# Patient Record
Sex: MALE | Race: WHITE | NOT HISPANIC OR LATINO | Employment: OTHER | URBAN - METROPOLITAN AREA
[De-identification: names, ages, dates, MRNs, and addresses within clinical notes are randomized per-mention and may not be internally consistent; named-entity substitution may affect disease eponyms.]

---

## 2017-02-23 ENCOUNTER — APPOINTMENT (OUTPATIENT)
Dept: RADIOLOGY | Facility: IMAGING CENTER | Age: 67
End: 2017-02-23
Attending: EMERGENCY MEDICINE
Payer: COMMERCIAL

## 2017-02-23 ENCOUNTER — HOSPITAL ENCOUNTER (OUTPATIENT)
Facility: MEDICAL CENTER | Age: 67
End: 2017-02-23
Attending: EMERGENCY MEDICINE
Payer: COMMERCIAL

## 2017-02-23 ENCOUNTER — TELEPHONE (OUTPATIENT)
Dept: URGENT CARE | Facility: PHYSICIAN GROUP | Age: 67
End: 2017-02-23

## 2017-02-23 ENCOUNTER — OFFICE VISIT (OUTPATIENT)
Dept: URGENT CARE | Facility: PHYSICIAN GROUP | Age: 67
End: 2017-02-23
Payer: COMMERCIAL

## 2017-02-23 VITALS
TEMPERATURE: 99.9 F | DIASTOLIC BLOOD PRESSURE: 82 MMHG | RESPIRATION RATE: 16 BRPM | OXYGEN SATURATION: 97 % | SYSTOLIC BLOOD PRESSURE: 128 MMHG | HEART RATE: 88 BPM | BODY MASS INDEX: 27.36 KG/M2 | HEIGHT: 74 IN | WEIGHT: 213.2 LBS

## 2017-02-23 DIAGNOSIS — S69.92XA INJURY OF LEFT INDEX FINGER, INITIAL ENCOUNTER: ICD-10-CM

## 2017-02-23 DIAGNOSIS — S61.219A LACERATION OF FINGER, INITIAL ENCOUNTER: ICD-10-CM

## 2017-02-23 PROCEDURE — 99204 OFFICE O/P NEW MOD 45 MIN: CPT | Performed by: EMERGENCY MEDICINE

## 2017-02-23 PROCEDURE — 73140 X-RAY EXAM OF FINGER(S): CPT | Mod: TC,LT | Performed by: EMERGENCY MEDICINE

## 2017-02-23 PROCEDURE — 99000 SPECIMEN HANDLING OFFICE-LAB: CPT | Performed by: EMERGENCY MEDICINE

## 2017-02-23 RX ORDER — SULFAMETHOXAZOLE AND TRIMETHOPRIM 800; 160 MG/1; MG/1
1 TABLET ORAL EVERY 12 HOURS
Qty: 20 TAB | Refills: 0 | Status: SHIPPED | OUTPATIENT
Start: 2017-02-23 | End: 2017-03-16

## 2017-02-23 RX ORDER — MUPIROCIN CALCIUM 20 MG/G
CREAM TOPICAL
Qty: 1 TUBE | Refills: 0 | Status: SHIPPED | OUTPATIENT
Start: 2017-02-23 | End: 2018-01-09

## 2017-02-23 ASSESSMENT — ENCOUNTER SYMPTOMS
SENSORY CHANGE: 0
CHILLS: 0
JOINT SWELLING: 0
DEPRESSION: 0
EYE REDNESS: 0
NUMBNESS: 0
ABDOMINAL PAIN: 0
FEVER: 1
VOMITING: 0
NAUSEA: 0
COUGH: 0
HEADACHES: 0
SPEECH CHANGE: 0
EYE DISCHARGE: 0

## 2017-02-23 NOTE — MR AVS SNAPSHOT
"        Francesco Schulte   2017 1:40 PM   Office Visit   MRN: [id number]    Department:  [facility]   Dept Phone:  [phone]    Description:  Male : 1950   Provider:  SATINDER Valenzuela M.D.           Reason for Visit     Finger Injury L. index finger injury X 1 week Pt. smashed the finger in a door.       Allergies as of 2017     Allergen Noted Reactions    Metformin 2016   Unspecified    \"Similar to a heart attack, I was hospitalized\"      You were diagnosed with     Laceration of finger, initial encounter   [[id number]]         Vital Signs     Blood Pressure Pulse Temperature Respirations Height Weight    128/82 mmHg 88 37.7 °C (99.9 °F) 16 1.88 m (6' 2\") 96.707 kg (213 lb 3.2 oz)    Body Mass Index Oxygen Saturation                27.36 kg/m2 97%          Basic Information     Date Of Birth Sex Race Ethnicity Preferred Language    1950 Male White Non- English      Problem List              ICD-10-CM Priority Class Noted - Resolved    Polycythemia D75.1   2016 - Present      Health Maintenance     Patient has no pending health maintenance at this time      Current Immunizations     No immunizations on file.      Below and/or attached are the medications your provider expects you to take. Review all of your home medications and newly ordered medications with your provider and/or pharmacist. Follow medication instructions as directed by your provider and/or pharmacist. Please keep your medication list with you and share with your provider. Update the information when medications are discontinued, doses are changed, or new medications (including over-the-counter products) are added; and carry medication information at all times in the event of emergency situations     Allergies:  METFORMIN - Unspecified               Medications  Valid as of: 2017 -  2:40 PM    Generic Name Brand Name Tablet Size Instructions for use    Apixaban (Tab) ELIQUIS 5mg Take 1 Tab by " mouth 2 Times a Day.        Colesevelam HCl (Tab) WELCHOL 625 MG Take 625 mg by mouth 2 times a day, with meals.        Finasteride (Tab) PROSCAR 5 MG Take 5 mg by mouth every day.        GlipiZIDE (Tab) GLUCOTROL 10 MG Take 10 mg by mouth 2 times a day.        Insulin NPH Human (Isophane) (Suspension) HUMULIN,NOVOLIN 100 UNIT/ML Inject 20 Units as instructed every evening.        Metoprolol Succinate (TABLET SR 24 HR) TOPROL XL 25 MG Take 1 Tab by mouth every day.        Metoprolol Tartrate (Tab) LOPRESSOR 50 MG Take 0.5 Tabs by mouth as needed (Take once a day PRN if patient feels rapid heart rate, make sure to take blood pressure to avoid hypotension of systolic less than 95.).        Mupirocin Calcium (Cream) BACTROBAN 2 % Apply twice a day and/ or after each washing to affected area.        Rivaroxaban (Tab) XARELTO 20 MG Take 1 Tab by mouth with dinner.        Sulfamethoxazole-Trimethoprim (Tab) BACTRIM -160 MG Take 1 Tab by mouth every 12 hours. Stay hydrated        Tadalafil (Tab) CIALIS 5 MG Take 5 mg by mouth every day.        Tamsulosin HCl (Cap) FLOMAX 0.4 MG Take 0.4 mg by mouth 2 Times a Day.        Ticagrelor (Tab) BRILINTA 90 MG Take 90 mg by mouth 2 Times a Day.        .                 Medicines prescribed today were sent to:     [facility] [address] [place] [address] - [id number] [place] [address] & [place] [id number] [facility] [address]    Phone: [phone] Fax: [phone]    Open 24 Hours?: No      Medication refill instructions:       If your prescription bottle indicates you have medication refills left, it is not necessary to call your provider’s office. Please contact your pharmacy and they will refill your medication.    If your prescription bottle indicates you do not have any refills left, you may request refills at any time through one of the following ways: The online [email or web address] system (except Urgent Care), by calling your provider’s office, or by  asking your pharmacy to contact your provider’s office with a refill request. Medication refills are processed only during regular business hours and may not be available until the next business day. Your provider may request additional information or to have a follow-up visit with you prior to refilling your medication.   *Please Note: Medication refills are assigned a new Rx number when refilled electronically. Your pharmacy may indicate that no refills were authorized even though a new prescription for the same medication is available at the pharmacy. Please request the medicine by name with the pharmacy before contacting your provider for a refill.           [employer] Access Code: [email or web address]  Expires: 3/10/2017 11:47 AM    [employer]  A secure, online tool to manage your health information     [employer]’s Grow[employer]® is a secure, online tool that connects you to your personalized health information from the privacy of your home -- day or night - making it very easy for you to manage your healthcare. Once the activation process is completed, you can even access your medical information using the Grow[employer] rayna, which is available for free in the Apple Rayna store or Google Play store.     [employer] provides the following levels of access (as shown below):   My Chart Features   [facility] Doctor [facility]  Specialists [facility]  Urgent  Care [facility]  Primary Care  Doctor   Email your healthcare team securely and privately 24/7 X X X    Manage appointments: schedule your next appointment; view details of past/upcoming appointments X      Request prescription refills. X      View recent personal medical records, including lab and immunizations X X X X   View health record, including health history, allergies, medications X X X X   Read reports about your outpatient visits, procedures, consult and ER notes X X X X   See your discharge summary, which is a recap of your hospital and/or ER visit that includes your  diagnosis, lab results, and care plan. X X       How to register for [email or web address]:  1. Go to  [email or web address].  2. Click on the Sign Up Now box, which takes you to the New Member Sign Up page. You will need to provide the following information:  a. Enter your [email or web address] Access Code exactly as it appears at the top of this page. (You will not need to use this code after you’ve completed the sign-up process. If you do not sign up before the expiration date, you must request a new code.)   b. Enter your date of birth.   c. Enter your home email address.   d. Click Submit, and follow the next screen’s instructions.  3. Create a Govenlock Greent ID. This will be your [email or web address] login ID and cannot be changed, so think of one that is secure and easy to remember.  4. Create a [employer] Greent password. You can change your password at any time.  5. Enter your Password Reset Question and Answer. This can be used at a later time if you forget your password.   6. Enter your e-mail address. This allows you to receive e-mail notifications when new information is available in [email or web address].  7. Click Sign Up. You can now view your health information.    For assistance activating your [email or web address] account, call [phone]

## 2017-02-23 NOTE — PROGRESS NOTES
"Subjective:      Francesco Schulte is a 66 y.o. male who presents with No chief complaint on file.            Hand Injury  This is a new problem. The current episode started in the past 7 days. Associated symptoms include a fever. Pertinent negatives include no abdominal pain, chest pain, chills, coughing, headaches, joint swelling, nausea, numbness, rash or vomiting. Associated symptoms comments: Patient caught his previously partially amputated left index finger in the garage door 6 days ago, now complaining of severe pain on the tip of the left index finger with occasional drainage.. Exacerbated by: tender to palpation. Treatments tried: patient is tried to keep the dressing on it.     Allergies   Allergen Reactions   • Metformin Unspecified     \"Similar to a heart attack, I was hospitalized\"     Social History     Social History   • Marital Status:      Spouse Name: N/A   • Number of Children: N/A   • Years of Education: N/A     Occupational History   • Not on file.     Social History Main Topics   • Smoking status: Not on file   • Smokeless tobacco: Not on file   • Alcohol Use: Not on file   • Drug Use: Not on file   • Sexual Activity: Not on file     Other Topics Concern   • Not on file     Social History Narrative   Medication Sig Dispense Refill   • metoprolol SR (TOPROL XL) 25 MG TABLET SR 24 HR Take 1 Tab by mouth every day. 30 Tab 2   • metoprolol (LOPRESSOR) 50 MG Tab Take 0.5 Tabs by mouth as needed (Take once a day PRN if patient feels rapid heart rate, make sure to take blood pressure to avoid hypotension of systolic less than 95.). 30 Tab 0   • apixaban (ELIQUIS) 5mg Tab Take 1 Tab by mouth 2 Times a Day. 60 Tab 1   • ticagrelor (BRILINTA) 90 MG Tab tablet Take 90 mg by mouth 2 Times a Day.     • tamsulosin (FLOMAX) 0.4 MG capsule Take 0.4 mg by mouth 2 Times a Day.     • finasteride (PROSCAR) 5 MG Tab Take 5 mg by mouth every day.     • colesevelam (WELCHOL) 625 MG Tab Take 625 mg by mouth 2 " "times a day, with meals.     • glipiZIDE (GLUCOTROL) 10 MG Tab Take 10 mg by mouth 2 times a day.     • tadalafil (CIALIS) 5 MG tablet Take 5 mg by mouth every day.     • insulin NPH (HUMULIN,NOVOLIN) 100 UNIT/ML Suspension Inject 20 Units as instructed every evening.     • rivaroxaban (XARELTO) 20 MG Tab tablet Take 1 Tab by mouth with dinner. 30 Tab 2     No current facility-administered medications on file prior to visit.   History reviewed. No pertinent family history.   Review of Systems   Constitutional: Positive for fever. Negative for chills.   Eyes: Negative for discharge and redness.   Respiratory: Negative for cough.    Cardiovascular: Negative for chest pain.   Gastrointestinal: Negative for nausea, vomiting and abdominal pain.   Musculoskeletal: Negative for joint pain and joint swelling.        Patient's very tender to the tip of his left index finger. Also says there is some discomfort on his third and fourth digits very   Skin: Negative for itching and rash.   Neurological: Negative for sensory change, speech change, numbness and headaches.   Psychiatric/Behavioral: Negative for depression and suicidal ideas.    last tetanus shot was 6 years ago.       Objective:     Blood pressure 128/82, pulse 88, temperature 37.7 °C (99.9 °F), resp. rate 16, height 1.88 m (6' 2\"), weight 96.707 kg (213 lb 3.2 oz), SpO2 97 %.     Physical Exam   Constitutional: He appears well-developed and well-nourished. No distress.   HENT:   Head: Normocephalic and atraumatic.   Right Ear: External ear normal.   Left Ear: External ear normal.   TMs are normal pharynx is benign without exudate. No signs of peritonsillar or retropharyngeal abscess. Mastoid processes are nontender.   Eyes: Right eye exhibits no discharge. Left eye exhibits no discharge. No scleral icterus.   Cardiovascular: Normal rate.    Pulmonary/Chest: Effort normal.   Abdominal: He exhibits no distension. There is no tenderness.   Musculoskeletal:   Index " finger distal tuft. Slight asymmetry of the tip. Patient states that's where the purulence or clear fluid was coming from and was cultured. A 18-gauge needle was used to lift the tip up to get a sample.   Neurological: He is alert.   Skin: Skin is warm and dry. He is not diaphoretic. No erythema.   Psychiatric: He has a normal mood and affect. His behavior is normal.   Vitals reviewed.      Finger x-rays shows old fracture avulsion but no evidence of osteomyelitis.  Assessment/Plan:     Diagnosis Crush injury to the left index                      Cellulitis to crush injury.    Patient will have his wound culture he will soak it twice a day placed Bactroban on the tip as well as take Septra double strength. I will call him with the cultures been covered for staph strep and MRSA should any of them being involved in his fingertip. Patient was given a reading of his x-ray

## 2017-02-28 ENCOUNTER — TELEPHONE (OUTPATIENT)
Dept: URGENT CARE | Facility: CLINIC | Age: 67
End: 2017-02-28

## 2017-02-28 NOTE — TELEPHONE ENCOUNTER
I contacted the patient he says his fingers doing much better  cultures were negative at 48 hours although the grams gained showed gram-positive cocci O contact him with any further report.

## 2017-03-05 ENCOUNTER — TELEPHONE (OUTPATIENT)
Dept: URGENT CARE | Facility: PHYSICIAN GROUP | Age: 67
End: 2017-03-05

## 2017-03-11 ENCOUNTER — APPOINTMENT (OUTPATIENT)
Dept: RADIOLOGY | Facility: MEDICAL CENTER | Age: 67
End: 2017-03-11
Attending: EMERGENCY MEDICINE
Payer: COMMERCIAL

## 2017-03-11 ENCOUNTER — APPOINTMENT (OUTPATIENT)
Dept: RADIOLOGY | Facility: MEDICAL CENTER | Age: 67
DRG: 247 | End: 2017-03-11
Attending: EMERGENCY MEDICINE
Payer: COMMERCIAL

## 2017-03-11 ENCOUNTER — HOSPITAL ENCOUNTER (EMERGENCY)
Facility: MEDICAL CENTER | Age: 67
End: 2017-03-11
Attending: EMERGENCY MEDICINE
Payer: COMMERCIAL

## 2017-03-11 ENCOUNTER — RESOLUTE PROFESSIONAL BILLING HOSPITAL PROF FEE (OUTPATIENT)
Dept: HOSPITALIST | Facility: MEDICAL CENTER | Age: 67
End: 2017-03-11
Payer: COMMERCIAL

## 2017-03-11 ENCOUNTER — HOSPITAL ENCOUNTER (INPATIENT)
Facility: MEDICAL CENTER | Age: 67
LOS: 3 days | DRG: 247 | End: 2017-03-14
Attending: EMERGENCY MEDICINE | Admitting: INTERNAL MEDICINE
Payer: COMMERCIAL

## 2017-03-11 VITALS
WEIGHT: 210 LBS | TEMPERATURE: 98.7 F | HEART RATE: 55 BPM | SYSTOLIC BLOOD PRESSURE: 103 MMHG | DIASTOLIC BLOOD PRESSURE: 58 MMHG | BODY MASS INDEX: 26.95 KG/M2

## 2017-03-11 DIAGNOSIS — E13.8 DIABETES MELLITUS OF OTHER TYPE WITH COMPLICATION: ICD-10-CM

## 2017-03-11 DIAGNOSIS — D75.839 THROMBOCYTOSIS: ICD-10-CM

## 2017-03-11 DIAGNOSIS — D75.1 POLYCYTHEMIA: ICD-10-CM

## 2017-03-11 DIAGNOSIS — I21.3 ST ELEVATION MYOCARDIAL INFARCTION (STEMI), UNSPECIFIED ARTERY (HCC): ICD-10-CM

## 2017-03-11 DIAGNOSIS — I48.0 PAROXYSMAL ATRIAL FIBRILLATION (HCC): ICD-10-CM

## 2017-03-11 DIAGNOSIS — N28.9 RENAL INSUFFICIENCY: ICD-10-CM

## 2017-03-11 LAB
ALBUMIN SERPL BCP-MCNC: 3.6 G/DL (ref 3.2–4.9)
ALBUMIN SERPL BCP-MCNC: 3.8 G/DL (ref 3.2–4.9)
ALBUMIN/GLOB SERPL: 1 G/DL
ALBUMIN/GLOB SERPL: 1 G/DL
ALP SERPL-CCNC: 67 U/L (ref 30–99)
ALP SERPL-CCNC: 73 U/L (ref 30–99)
ALT SERPL-CCNC: 34 U/L (ref 2–50)
ALT SERPL-CCNC: 38 U/L (ref 2–50)
ANION GAP SERPL CALC-SCNC: 11 MMOL/L (ref 0–11.9)
ANION GAP SERPL CALC-SCNC: 17 MMOL/L (ref 0–11.9)
ANISOCYTOSIS BLD QL SMEAR: ABNORMAL
APTT PPP: 31.9 SEC (ref 24.7–36)
APTT PPP: 33.1 SEC (ref 24.7–36)
AST SERPL-CCNC: 29 U/L (ref 12–45)
AST SERPL-CCNC: 36 U/L (ref 12–45)
BASOPHILS # BLD AUTO: 0 % (ref 0–1.8)
BASOPHILS # BLD AUTO: 0.2 % (ref 0–1.8)
BASOPHILS # BLD: 0 K/UL (ref 0–0.12)
BASOPHILS # BLD: 0.04 K/UL (ref 0–0.12)
BILIRUB SERPL-MCNC: 1.2 MG/DL (ref 0.1–1.5)
BILIRUB SERPL-MCNC: 1.5 MG/DL (ref 0.1–1.5)
BNP SERPL-MCNC: 146 PG/ML (ref 0–100)
BNP SERPL-MCNC: 150 PG/ML (ref 0–100)
BUN SERPL-MCNC: 20 MG/DL (ref 8–22)
BUN SERPL-MCNC: 21 MG/DL (ref 8–22)
CALCIUM SERPL-MCNC: 9.3 MG/DL (ref 8.4–10.2)
CALCIUM SERPL-MCNC: 9.8 MG/DL (ref 8.5–10.5)
CHLORIDE SERPL-SCNC: 100 MMOL/L (ref 96–112)
CHLORIDE SERPL-SCNC: 97 MMOL/L (ref 96–112)
CO2 SERPL-SCNC: 18 MMOL/L (ref 20–33)
CO2 SERPL-SCNC: 22 MMOL/L (ref 20–33)
COMMENT 1642: NORMAL
CREAT SERPL-MCNC: 1.75 MG/DL (ref 0.5–1.4)
CREAT SERPL-MCNC: 1.76 MG/DL (ref 0.5–1.4)
EKG IMPRESSION: NORMAL
EKG IMPRESSION: NORMAL
EOSINOPHIL # BLD AUTO: 0 K/UL (ref 0–0.51)
EOSINOPHIL # BLD AUTO: 0.05 K/UL (ref 0–0.51)
EOSINOPHIL NFR BLD: 0 % (ref 0–6.9)
EOSINOPHIL NFR BLD: 0.3 % (ref 0–6.9)
ERYTHROCYTE [DISTWIDTH] IN BLOOD BY AUTOMATED COUNT: 42.5 FL (ref 35.9–50)
ERYTHROCYTE [DISTWIDTH] IN BLOOD BY AUTOMATED COUNT: 43 FL (ref 35.9–50)
GFR SERPL CREATININE-BSD FRML MDRD: 39 ML/MIN/1.73 M 2
GFR SERPL CREATININE-BSD FRML MDRD: 39 ML/MIN/1.73 M 2
GIANT PLATELETS BLD QL SMEAR: NORMAL
GLOBULIN SER CALC-MCNC: 3.6 G/DL (ref 1.9–3.5)
GLOBULIN SER CALC-MCNC: 3.9 G/DL (ref 1.9–3.5)
GLUCOSE SERPL-MCNC: 251 MG/DL (ref 65–99)
GLUCOSE SERPL-MCNC: 255 MG/DL (ref 65–99)
HCT VFR BLD AUTO: 41.7 % (ref 42–52)
HCT VFR BLD AUTO: 42.1 % (ref 42–52)
HGB BLD-MCNC: 12.2 G/DL (ref 14–18)
HGB BLD-MCNC: 12.3 G/DL (ref 14–18)
HYPOCHROMIA BLD QL SMEAR: ABNORMAL
IMM GRANULOCYTES # BLD AUTO: 0.13 K/UL (ref 0–0.11)
IMM GRANULOCYTES NFR BLD AUTO: 0.8 % (ref 0–0.9)
INR PPP: 1.63 (ref 0.87–1.13)
INR PPP: 1.64 (ref 0.87–1.13)
LG PLATELETS BLD QL SMEAR: NORMAL
LIPASE SERPL-CCNC: 29 U/L (ref 11–82)
LYMPHOCYTES # BLD AUTO: 0.15 K/UL (ref 1–4.8)
LYMPHOCYTES # BLD AUTO: 0.24 K/UL (ref 1–4.8)
LYMPHOCYTES NFR BLD: 0.9 % (ref 22–41)
LYMPHOCYTES NFR BLD: 1.4 % (ref 22–41)
MACROCYTES BLD QL SMEAR: ABNORMAL
MANUAL DIFF BLD: NORMAL
MCH RBC QN AUTO: 18.5 PG (ref 27–33)
MCH RBC QN AUTO: 18.7 PG (ref 27–33)
MCHC RBC AUTO-ENTMCNC: 29 G/DL (ref 33.7–35.3)
MCHC RBC AUTO-ENTMCNC: 29.5 G/DL (ref 33.7–35.3)
MCV RBC AUTO: 63.3 FL (ref 81.4–97.8)
MCV RBC AUTO: 63.9 FL (ref 81.4–97.8)
METAMYELOCYTES NFR BLD MANUAL: 1.7 %
MONOCYTES # BLD AUTO: 0 K/UL (ref 0–0.85)
MONOCYTES # BLD AUTO: 0.32 K/UL (ref 0–0.85)
MONOCYTES NFR BLD AUTO: 0 % (ref 0–13.4)
MONOCYTES NFR BLD AUTO: 1.9 % (ref 0–13.4)
MORPHOLOGY BLD-IMP: NORMAL
NEUTROPHILS # BLD AUTO: 16.09 K/UL (ref 1.82–7.42)
NEUTROPHILS # BLD AUTO: 16.36 K/UL (ref 1.82–7.42)
NEUTROPHILS NFR BLD: 95.4 % (ref 44–72)
NEUTROPHILS NFR BLD: 97.4 % (ref 44–72)
NRBC # BLD AUTO: 0.02 K/UL
NRBC # BLD AUTO: 0.02 K/UL
NRBC BLD AUTO-RTO: 0.1 /100 WBC
NRBC BLD AUTO-RTO: 0.1 /100 WBC
PLATELET # BLD AUTO: 1053 K/UL (ref 164–446)
PLATELET # BLD AUTO: 1160 K/UL (ref 164–446)
PLATELET BLD QL SMEAR: NORMAL
PLATELET BLD QL SMEAR: NORMAL
PMV BLD AUTO: 9.4 FL (ref 9–12.9)
PMV BLD AUTO: 9.7 FL (ref 9–12.9)
POLYCHROMASIA BLD QL SMEAR: NORMAL
POTASSIUM SERPL-SCNC: 3.6 MMOL/L (ref 3.6–5.5)
POTASSIUM SERPL-SCNC: 4.1 MMOL/L (ref 3.6–5.5)
PROT SERPL-MCNC: 7.2 G/DL (ref 6–8.2)
PROT SERPL-MCNC: 7.7 G/DL (ref 6–8.2)
PROTHROMBIN TIME: 19.2 SEC (ref 12–14.6)
PROTHROMBIN TIME: 19.8 SEC (ref 12–14.6)
RBC # BLD AUTO: 6.59 M/UL (ref 4.7–6.1)
RBC # BLD AUTO: 6.59 M/UL (ref 4.7–6.1)
RBC BLD AUTO: PRESENT
RBC BLD AUTO: PRESENT
SODIUM SERPL-SCNC: 132 MMOL/L (ref 135–145)
SODIUM SERPL-SCNC: 133 MMOL/L (ref 135–145)
TARGETS BLD QL SMEAR: NORMAL
TROPONIN I SERPL-MCNC: 0.02 NG/ML (ref 0–0.04)
TROPONIN I SERPL-MCNC: <0.02 NG/ML (ref 0–0.04)
WBC # BLD AUTO: 16.8 K/UL (ref 4.8–10.8)
WBC # BLD AUTO: 16.9 K/UL (ref 4.8–10.8)

## 2017-03-11 PROCEDURE — C1894 INTRO/SHEATH, NON-LASER: HCPCS

## 2017-03-11 PROCEDURE — 83880 ASSAY OF NATRIURETIC PEPTIDE: CPT

## 2017-03-11 PROCEDURE — 80053 COMPREHEN METABOLIC PANEL: CPT

## 2017-03-11 PROCEDURE — 84484 ASSAY OF TROPONIN QUANT: CPT | Mod: 91

## 2017-03-11 PROCEDURE — A9270 NON-COVERED ITEM OR SERVICE: HCPCS | Performed by: INTERNAL MEDICINE

## 2017-03-11 PROCEDURE — 027035Z DILATION OF CORONARY ARTERY, ONE ARTERY WITH TWO DRUG-ELUTING INTRALUMINAL DEVICES, PERCUTANEOUS APPROACH: ICD-10-PCS | Performed by: INTERNAL MEDICINE

## 2017-03-11 PROCEDURE — 700111 HCHG RX REV CODE 636 W/ 250 OVERRIDE (IP)

## 2017-03-11 PROCEDURE — 85025 COMPLETE CBC W/AUTO DIFF WBC: CPT

## 2017-03-11 PROCEDURE — 82962 GLUCOSE BLOOD TEST: CPT

## 2017-03-11 PROCEDURE — 96374 THER/PROPH/DIAG INJ IV PUSH: CPT

## 2017-03-11 PROCEDURE — 99285 EMERGENCY DEPT VISIT HI MDM: CPT

## 2017-03-11 PROCEDURE — 85007 BL SMEAR W/DIFF WBC COUNT: CPT

## 2017-03-11 PROCEDURE — 360979 HCHG DIAGNOSTIC CATH

## 2017-03-11 PROCEDURE — 99291 CRITICAL CARE FIRST HOUR: CPT

## 2017-03-11 PROCEDURE — 83880 ASSAY OF NATRIURETIC PEPTIDE: CPT | Mod: 91

## 2017-03-11 PROCEDURE — 02C03ZZ EXTIRPATION OF MATTER FROM CORONARY ARTERY, ONE ARTERY, PERCUTANEOUS APPROACH: ICD-10-PCS | Performed by: INTERNAL MEDICINE

## 2017-03-11 PROCEDURE — 71010 DX-CHEST-PORTABLE (1 VIEW): CPT

## 2017-03-11 PROCEDURE — C1887 CATHETER, GUIDING: HCPCS

## 2017-03-11 PROCEDURE — 93010 ELECTROCARDIOGRAM REPORT: CPT | Performed by: INTERNAL MEDICINE

## 2017-03-11 PROCEDURE — C1874 STENT, COATED/COV W/DEL SYS: HCPCS

## 2017-03-11 PROCEDURE — B2151ZZ FLUOROSCOPY OF LEFT HEART USING LOW OSMOLAR CONTRAST: ICD-10-PCS | Performed by: INTERNAL MEDICINE

## 2017-03-11 PROCEDURE — 700111 HCHG RX REV CODE 636 W/ 250 OVERRIDE (IP): Performed by: INTERNAL MEDICINE

## 2017-03-11 PROCEDURE — 700102 HCHG RX REV CODE 250 W/ 637 OVERRIDE(OP)

## 2017-03-11 PROCEDURE — 93458 L HRT ARTERY/VENTRICLE ANGIO: CPT

## 2017-03-11 PROCEDURE — 700105 HCHG RX REV CODE 258: Performed by: INTERNAL MEDICINE

## 2017-03-11 PROCEDURE — 304952 HCHG R 2 PADS

## 2017-03-11 PROCEDURE — 4A023N7 MEASUREMENT OF CARDIAC SAMPLING AND PRESSURE, LEFT HEART, PERCUTANEOUS APPROACH: ICD-10-PCS | Performed by: INTERNAL MEDICINE

## 2017-03-11 PROCEDURE — 93005 ELECTROCARDIOGRAM TRACING: CPT | Performed by: EMERGENCY MEDICINE

## 2017-03-11 PROCEDURE — 99223 1ST HOSP IP/OBS HIGH 75: CPT | Performed by: INTERNAL MEDICINE

## 2017-03-11 PROCEDURE — 85610 PROTHROMBIN TIME: CPT

## 2017-03-11 PROCEDURE — A9270 NON-COVERED ITEM OR SERVICE: HCPCS

## 2017-03-11 PROCEDURE — 85027 COMPLETE CBC AUTOMATED: CPT

## 2017-03-11 PROCEDURE — C1769 GUIDE WIRE: HCPCS

## 2017-03-11 PROCEDURE — 99152 MOD SED SAME PHYS/QHP 5/>YRS: CPT

## 2017-03-11 PROCEDURE — 99153 MOD SED SAME PHYS/QHP EA: CPT

## 2017-03-11 PROCEDURE — C1725 CATH, TRANSLUMIN NON-LASER: HCPCS

## 2017-03-11 PROCEDURE — 700117 HCHG RX CONTRAST REV CODE 255: Performed by: INTERNAL MEDICINE

## 2017-03-11 PROCEDURE — 700102 HCHG RX REV CODE 250 W/ 637 OVERRIDE(OP): Performed by: INTERNAL MEDICINE

## 2017-03-11 PROCEDURE — 85730 THROMBOPLASTIN TIME PARTIAL: CPT | Mod: 91

## 2017-03-11 PROCEDURE — 307093 HCHG TR BAND RADIAL

## 2017-03-11 PROCEDURE — 700101 HCHG RX REV CODE 250

## 2017-03-11 PROCEDURE — 700105 HCHG RX REV CODE 258

## 2017-03-11 PROCEDURE — C1757 CATH, THROMBECTOMY/EMBOLECT: HCPCS

## 2017-03-11 PROCEDURE — 36415 COLL VENOUS BLD VENIPUNCTURE: CPT

## 2017-03-11 PROCEDURE — 85730 THROMBOPLASTIN TIME PARTIAL: CPT

## 2017-03-11 PROCEDURE — 303418 HCHG 4FR ULTIMATE CATHETER

## 2017-03-11 PROCEDURE — 80053 COMPREHEN METABOLIC PANEL: CPT | Mod: 91

## 2017-03-11 PROCEDURE — 770022 HCHG ROOM/CARE - ICU (200)

## 2017-03-11 PROCEDURE — 83690 ASSAY OF LIPASE: CPT

## 2017-03-11 PROCEDURE — 84484 ASSAY OF TROPONIN QUANT: CPT

## 2017-03-11 PROCEDURE — C9606 PERC D-E COR REVASC W AMI S: HCPCS | Mod: LD

## 2017-03-11 PROCEDURE — B2111ZZ FLUOROSCOPY OF MULTIPLE CORONARY ARTERIES USING LOW OSMOLAR CONTRAST: ICD-10-PCS | Performed by: INTERNAL MEDICINE

## 2017-03-11 PROCEDURE — 93005 ELECTROCARDIOGRAM TRACING: CPT | Performed by: INTERNAL MEDICINE

## 2017-03-11 PROCEDURE — 85610 PROTHROMBIN TIME: CPT | Mod: 91

## 2017-03-11 PROCEDURE — 92924 PRQ TRLUML C ATHRC 1 ART&/BR: CPT | Mod: LD

## 2017-03-11 RX ORDER — BIVALIRUDIN 250 MG/5ML
INJECTION, POWDER, LYOPHILIZED, FOR SOLUTION INTRAVENOUS
Status: COMPLETED
Start: 2017-03-11 | End: 2017-03-11

## 2017-03-11 RX ORDER — NOREPINEPHRINE BITARTRATE 1 MG/ML
INJECTION, SOLUTION INTRAVENOUS
Status: COMPLETED
Start: 2017-03-11 | End: 2017-03-11

## 2017-03-11 RX ORDER — SODIUM CHLORIDE 9 MG/ML
INJECTION, SOLUTION INTRAVENOUS CONTINUOUS
Status: DISCONTINUED | OUTPATIENT
Start: 2017-03-11 | End: 2017-03-13

## 2017-03-11 RX ORDER — SODIUM CHLORIDE 9 MG/ML
1000 INJECTION, SOLUTION INTRAVENOUS ONCE
Status: DISCONTINUED | OUTPATIENT
Start: 2017-03-11 | End: 2017-03-11 | Stop reason: HOSPADM

## 2017-03-11 RX ORDER — INSULIN GLARGINE 100 [IU]/ML
10 INJECTION, SOLUTION SUBCUTANEOUS ONCE
Status: COMPLETED | OUTPATIENT
Start: 2017-03-11 | End: 2017-03-11

## 2017-03-11 RX ORDER — INSULIN GLARGINE 100 [IU]/ML
10 INJECTION, SOLUTION SUBCUTANEOUS NIGHTLY
COMMUNITY
End: 2018-10-15 | Stop reason: SDUPTHER

## 2017-03-11 RX ORDER — DIPHENHYDRAMINE HCL 50 MG
50 CAPSULE ORAL EVERY 6 HOURS PRN
Status: SHIPPED | COMMUNITY
End: 2018-01-25

## 2017-03-11 RX ORDER — DEXTROSE MONOHYDRATE 25 G/50ML
25 INJECTION, SOLUTION INTRAVENOUS
Status: DISCONTINUED | OUTPATIENT
Start: 2017-03-11 | End: 2017-03-14 | Stop reason: HOSPADM

## 2017-03-11 RX ORDER — EPTIFIBATIDE 0.75 MG/ML
2 INJECTION, SOLUTION INTRAVENOUS CONTINUOUS
Status: DISPENSED | OUTPATIENT
Start: 2017-03-11 | End: 2017-03-12

## 2017-03-11 RX ORDER — VERAPAMIL HYDROCHLORIDE 2.5 MG/ML
INJECTION, SOLUTION INTRAVENOUS
Status: COMPLETED
Start: 2017-03-11 | End: 2017-03-11

## 2017-03-11 RX ORDER — TAMSULOSIN HYDROCHLORIDE 0.4 MG/1
0.4 CAPSULE ORAL DAILY
Status: DISCONTINUED | OUTPATIENT
Start: 2017-03-11 | End: 2017-03-12

## 2017-03-11 RX ORDER — LIDOCAINE HYDROCHLORIDE 20 MG/ML
INJECTION, SOLUTION INFILTRATION; PERINEURAL
Status: COMPLETED
Start: 2017-03-11 | End: 2017-03-11

## 2017-03-11 RX ORDER — ONDANSETRON 2 MG/ML
INJECTION INTRAMUSCULAR; INTRAVENOUS
Status: COMPLETED
Start: 2017-03-11 | End: 2017-03-11

## 2017-03-11 RX ORDER — HEPARIN SODIUM,PORCINE 1000/ML
VIAL (ML) INJECTION
Status: COMPLETED
Start: 2017-03-11 | End: 2017-03-11

## 2017-03-11 RX ORDER — MIDAZOLAM HYDROCHLORIDE 1 MG/ML
INJECTION INTRAMUSCULAR; INTRAVENOUS
Status: COMPLETED
Start: 2017-03-11 | End: 2017-03-11

## 2017-03-11 RX ORDER — FAMOTIDINE 20 MG/1
40 TABLET, FILM COATED ORAL DAILY
Status: DISCONTINUED | OUTPATIENT
Start: 2017-03-11 | End: 2017-03-14

## 2017-03-11 RX ORDER — ATORVASTATIN CALCIUM 10 MG/1
10 TABLET, FILM COATED ORAL
Status: DISCONTINUED | OUTPATIENT
Start: 2017-03-11 | End: 2017-03-12

## 2017-03-11 RX ORDER — ASPIRIN 81 MG/1
81 TABLET, CHEWABLE ORAL DAILY
Status: DISCONTINUED | OUTPATIENT
Start: 2017-03-12 | End: 2017-03-14 | Stop reason: HOSPADM

## 2017-03-11 RX ADMIN — EPTIFIBATIDE 17.2 MG: 2 INJECTION, SOLUTION INTRAVENOUS at 16:59

## 2017-03-11 RX ADMIN — INSULIN GLARGINE 10 UNITS: 100 INJECTION, SOLUTION SUBCUTANEOUS at 22:27

## 2017-03-11 RX ADMIN — SODIUM CHLORIDE: 9 INJECTION, SOLUTION INTRAVENOUS at 19:37

## 2017-03-11 RX ADMIN — LIDOCAINE HYDROCHLORIDE: 20 INJECTION, SOLUTION INFILTRATION; PERINEURAL at 16:51

## 2017-03-11 RX ADMIN — HEPARIN SODIUM: 1000 INJECTION, SOLUTION INTRAVENOUS; SUBCUTANEOUS at 16:51

## 2017-03-11 RX ADMIN — FENTANYL CITRATE 50 MCG: 50 INJECTION, SOLUTION INTRAMUSCULAR; INTRAVENOUS at 17:33

## 2017-03-11 RX ADMIN — BIVALIRUDIN 250 MG: 250 INJECTION, POWDER, LYOPHILIZED, FOR SOLUTION INTRAVENOUS at 17:33

## 2017-03-11 RX ADMIN — NITROGLYCERIN 10 ML: 20 INJECTION INTRAVENOUS at 16:53

## 2017-03-11 RX ADMIN — TAMSULOSIN HYDROCHLORIDE 0.4 MG: 0.4 CAPSULE ORAL at 19:37

## 2017-03-11 RX ADMIN — ONDANSETRON 4 MG: 2 INJECTION, SOLUTION INTRAMUSCULAR; INTRAVENOUS at 16:52

## 2017-03-11 RX ADMIN — TICAGRELOR 90 MG: 90 TABLET ORAL at 17:35

## 2017-03-11 RX ADMIN — MIDAZOLAM 1 MG: 1 INJECTION INTRAMUSCULAR; INTRAVENOUS at 17:33

## 2017-03-11 RX ADMIN — EPTIFIBATIDE 2 MCG/KG/MIN: 0.75 INJECTION, SOLUTION INTRAVENOUS at 19:13

## 2017-03-11 RX ADMIN — FENTANYL CITRATE 50 MCG: 50 INJECTION, SOLUTION INTRAMUSCULAR; INTRAVENOUS at 15:45

## 2017-03-11 RX ADMIN — IOHEXOL 366 ML: 350 INJECTION, SOLUTION INTRAVENOUS at 17:32

## 2017-03-11 RX ADMIN — VERAPAMIL HYDROCHLORIDE 2.5 MG: 2.5 INJECTION, SOLUTION INTRAVENOUS at 16:52

## 2017-03-11 RX ADMIN — HEPARIN SODIUM 2000 UNITS: 200 INJECTION, SOLUTION INTRAVENOUS at 16:52

## 2017-03-11 RX ADMIN — FAMOTIDINE 40 MG: 20 TABLET, FILM COATED ORAL at 22:29

## 2017-03-11 RX ADMIN — NOREPINEPHRINE BITARTRATE 8 MG: 1 INJECTION INTRAVENOUS at 16:54

## 2017-03-11 ASSESSMENT — LIFESTYLE VARIABLES
TOTAL SCORE: 0
HAVE PEOPLE ANNOYED YOU BY CRITICIZING YOUR DRINKING: NO
CONSUMPTION TOTAL: NEGATIVE
HAVE YOU EVER FELT YOU SHOULD CUT DOWN ON YOUR DRINKING: NO
EVER FELT BAD OR GUILTY ABOUT YOUR DRINKING: NO
ALCOHOL_USE: YES
ON A TYPICAL DAY WHEN YOU DRINK ALCOHOL HOW MANY DRINKS DO YOU HAVE: 1
AVERAGE NUMBER OF DAYS PER WEEK YOU HAVE A DRINK CONTAINING ALCOHOL: 1
EVER HAD A DRINK FIRST THING IN THE MORNING TO STEADY YOUR NERVES TO GET RID OF A HANGOVER: NO
EVER_SMOKED: NEVER
HOW MANY TIMES IN THE PAST YEAR HAVE YOU HAD 5 OR MORE DRINKS IN A DAY: 0
TOTAL SCORE: 0
TOTAL SCORE: 0

## 2017-03-11 ASSESSMENT — PAIN SCALES - GENERAL
PAINLEVEL_OUTOF10: 0

## 2017-03-11 NOTE — ED PROVIDER NOTES
"ED Provider Note    Chief complaint  Chest pain      HPI  Francesco Schulte is a 66 y.o. male who presents to the emergency department stating that a couple of hours prior to arrival he developed severe left-sided chest pain. The pain radiates somewhat down the left arm. The patient took aspirin and nitroglycerin with minimal relief. The symptoms began when he was in [facility] and he drove about an hour and a half here prior to seeking treatment. The patient says he has a history of polycythemia as well as episodes of atrial fibrillation and has had a history of \"thrombosis in my heart\" the patient says that he has never had blood clots in his legs or lungs. At the time of arrival pain is 6 out of 10 in intensity.    REVIEW OF SYSTEMS no shortness of breath no hemoptysis no trauma no abdominal or back pain. All other systems negative    PAST MEDICAL HISTORY  Past Medical History   Diagnosis Date   • Diabetes (CMS-HCC)    • Blood clotting disorder ([facility])        FAMILY HISTORY  History reviewed. No pertinent family history.    SOCIAL HISTORY  Social History     Social History   • Marital Status:      Spouse Name: N/A   • Number of Children: N/A   • Years of Education: N/A     Social History Main Topics   • Smoking status: Former Smoker   • Smokeless tobacco: None   • Alcohol Use: No   • Drug Use: No   • Sexual Activity: Not Asked     Other Topics Concern   • None     Social History Narrative       SURGICAL HISTORY  Past Surgical History   Procedure Laterality Date   • Other cardiac surgery       stents x 3       CURRENT MEDICATIONS  Home Medications     Reviewed by Usman Randle (Pharmacy Tech) on 03/11/17 at 1541  Med List Status: Complete    Medication Last Dose Status    apixaban (ELIQUIS) 5mg Tab 3/11/2017 Active    colesevelam (WELCHOL) 625 MG Tab 3/11/2017 Active    diphenhydrAMINE (BENADRYL) 50 MG Cap 3/11/2017 Active    finasteride (PROSCAR) 5 MG Tab 3/11/2017 Active    glipiZIDE " "(GLUCOTROL) 10 MG Tab 3/10/2017 Active    metoprolol SR (TOPROL XL) 25 MG TABLET SR 24 HR 3/11/2017 Active    mupirocin calcium (BACTROBAN) 2 % Cream 3/10/2017 Active    sulfamethoxazole-trimethoprim (BACTRIM DS) 800-160 MG tablet 3/4/2017 Active    tadalafil (CIALIS) 5 MG tablet unk Active    tamsulosin (FLOMAX) 0.4 MG capsule 3/11/2017 Active    ticagrelor (BRILINTA) 90 MG Tab tablet 3/11/2017 Active                ALLERGIES  Allergies   Allergen Reactions   • Metformin Unspecified     \"Similar to a heart attack, I was hospitalized\"       PHYSICAL EXAM  VITAL SIGNS: /58 mmHg  Pulse 55  Temp(Src) 37.1 °C (98.7 °F)  Wt 95.255 kg (210 lb)   Constitutional: Awake and verbal and anxious  HENT: Mucous membranes moist  Eyes: No erythema or discharge or jaundice  Neck: Trachea midline no JVD  Cardiovascular: Regular rate and rhythm  Lungs: Clear and equal bilaterally with no apparent difficulty breathing  Abdomen/Back: Soft nontender nondistended no peritoneal findings  Skin: Warm and dry  Musculoskeletal: No leg edema or calf tenderness  Neurologic: Awake and verbal and moving all extremities    Laboratory  Laboratory testing was ordered and is pending    EKG interpretation  A 12-lead EKG shows a sinus rhythm at approximately 75 bpm, there is some ectopy and baseline artifact but the underlying rhythm appears to be sinus. There is ST elevation seen in lead V2 and V3 and Q waves also noted in V1 and V2 and V3. The ST elevation is new compared to a previous cardiogram.    MEDICAL DECISION MAKING and DISPOSITION  The patient's cardiogram is concerning for acute ST elevation myocardial infarction and consistent with his symptoms. The patient took aspirin prior to arrival. The patient was given intravenous fluids as well as fentanyl and Zofran for discomfort while in the emergency department. I reviewed the initial case with the ERP at [facility] emergency department and also Dr. Neil who is " on-call for cardiology at that facility and the patient will be transferred by ambulance emergently for further evaluation and further treatment and may require intervention. I have reviewed this with the patient and he is in favor of transfer.    IMPRESSION  1. Acute ST elevation myocardial infarction  2. History of polycythemia      Electronically signed by: Edgardo Romo, 3/11/2017 3:45 PM

## 2017-03-11 NOTE — IP AVS SNAPSHOT
" <p align=\"LEFT\"><IMG SRC=\"//[email or web address]\" alt=\"Image\" WIDTH=\"50%\" HEIGHT=\"200\" BORDER=\"\"></p>                   Name:Francesco Schulte  Medical Record Number:[id number]  CSN: [id number]    YOB: 1950   Age: 66 y.o.  Sex: male  HT:1.88 m (6' 2\") WT: 97.3 kg (214 lb 8.1 oz)          Admit Date: 3/11/2017     Discharge Date:   Today's Date: 3/14/2017  Attending Doctor:  Lenin Jose M.D.                  Allergies:  Metformin and Statins             Medication List      Take these Medications        Instructions    apixaban 5mg Tabs   Commonly known as:  ELIQUIS    Take 1 Tab by mouth 2 Times a Day.   Dose:  5 mg       aspirin 81 MG Chew chewable tablet   Commonly known as:  ASA    Take 1 Tab by mouth every day.   Dose:  81 mg       atorvastatin 40 MG Tabs   Commonly known as:  LIPITOR    Take 1 Tab by mouth every bedtime.   Dose:  40 mg       * BRILINTA 90 MG Tabs tablet   What changed:  Another medication with the same name was added. Make sure you understand how and when to take each.   Generic drug:  ticagrelor    Take 90 mg by mouth 2 Times a Day.   Dose:  90 mg       * ticagrelor 90 MG Tabs tablet   What changed:  You were already taking a medication with the same name, and this prescription was added. Make sure you understand how and when to take each.   Commonly known as:  BRILINTA    Take 1 Tab by mouth 2 Times a Day.   Dose:  90 mg       CIALIS 5 MG tablet   Generic drug:  tadalafil    Take 5 mg by mouth every day.   Dose:  5 mg       colesevelam 625 MG Tabs   Commonly known as:  WELCHOL    Take 625 mg by mouth 2 times a day, with meals.   Dose:  625 mg       diphenhydrAMINE 50 MG Caps   Commonly known as:  BENADRYL    Take 50 mg by mouth every 6 hours as needed.   Dose:  50 mg       finasteride 5 MG Tabs   Commonly known as:  PROSCAR    Take 5 mg by mouth every day.   Dose:  5 mg       fludrocortisone 0.1 MG Tabs   Commonly known as:  FLORINEF    Take 1 Tab by mouth every morning.   "   Dose:  0.1 mg       glipiZIDE 10 MG Tabs   Commonly known as:  GLUCOTROL    Take 10 mg by mouth every evening.   Dose:  10 mg       hydroxyurea 500 MG Caps   What changed:  how much to take   Commonly known as:  HYDREA    Take 3 Caps by mouth every day.   Dose:  1500 mg       insulin glargine 100 UNIT/ML Soln   Commonly known as:  LANTUS    Inject 20 Units as instructed every evening.   Dose:  20 Units       metoprolol SR 25 MG Tb24   Commonly known as:  TOPROL XL    Take 1 Tab by mouth every day.   Dose:  25 mg       mupirocin calcium 2 % Crea   Commonly known as:  BACTROBAN    Apply twice a day and/ or after each washing to affected area.       sulfamethoxazole-trimethoprim 800-160 MG tablet   Commonly known as:  BACTRIM DS    Take 1 Tab by mouth every 12 hours. Stay hydrated   Dose:  1 Tab       tamsulosin 0.4 MG capsule   Commonly known as:  FLOMAX    Take 0.4 mg by mouth 2 Times a Day.   Dose:  0.4 mg       * Notice:  This list has 2 medication(s) that are the same as other medications prescribed for you. Read the directions carefully, and ask your doctor or other care provider to review them with you.

## 2017-03-11 NOTE — IP AVS SNAPSHOT
" Home Care Instructions                                                                                                                  Name:Francesco Schulte  Medical Record Number:[id number]  CSN: [id number]    YOB: 1950   Age: 66 y.o.  Sex: male  HT:1.88 m (6' 2\") WT: 97.3 kg (214 lb 8.1 oz)          Admit Date: 3/11/2017     Discharge Date:   Today's Date: 3/14/2017  Attending Doctor:  Lenin Jose M.D.                  Allergies:  Metformin and Statins            Discharge Instructions       Comments: Discharge patient Home   Diet cardiac with 9-13 servings of fruits and vegetables   Activities as tolerated   Follow ups with PCP in 7-10 days, call for appointment   Meds per med rec sheet   No smoking, no alcohol, no caffeine   Wear seat belt in motorized vehicle   Take medications as perscribed   Keep appointments   If symptoms worsen call PCP, 911 or urgent care.Discharge Instructions    Discharged to home by car with relative. Discharged via wheelchair, hospital escort: Yes.  Special equipment needed: Cane    Be sure to schedule a follow-up appointment with your primary care doctor or any specialists as instructed.     Discharge Plan:   Influenza Vaccine Indication: Not indicated: Previously immunized this influenza season and > 8 years of age    I understand that a diet low in cholesterol, fat, and sodium is recommended for good health. Unless I have been given specific instructions below for another diet, I accept this instruction as my diet prescription.   Other diet: per doctor above    Special Instructions: Diagnosis:  Acute Coronary Syndrome (ACS) is a diagnosis that encompasses cardiac-related chest pain and heart attack. ACS occurs when the blood flow to the heart muscle is severely reduced or cut off completely due to a slow process called atherosclerosis.  Atherosclerosis is a disease in which the coronary arteries become narrow from a buildup of fat, cholesterol, and other substances that " combine to form plaque. If the plaque breaks, a blood clot will form and block the blood flow to the heart muscle. This lack of blood flow can cause damage or death to the heart muscle which is called a heart attack or Myocardial Infarction (MI). There are two different types of MIs:  ST Elevation Myocardial Infarction or STEMI (the most severe type of heart attack) and Non-ST Elevation Myocardial Infarction or NSTEMI.    Treatment Plan:  · Cardiac Diet  - Low fat, low salt, low cholesterol   · Cardiac Rehab  - Your doctor has ordered you a referral to [facility].  Call [phone] to schedule an appointment.  · Attend my follow-up appointment with my Cardiologist.  · Take my medications as prescribed by my doctor  · Exercise daily  · Quit Smoking, Lower my bad cholesterol and raise my good cholesterol, lower my blood pressure, Reduce stress and Control my diabetes    Medications:  Certain medications are used to treat ACS.  Remember to always take medications as prescribed and never stop talking medications unless told by your doctor.    You have been prescribed the following medicatons:    Aspirin - Aspirin is used as a blood thinning medication and you will require this medication indefinitely.  Anti-platelet/blood thinner - Your Anti-platelet/Blood thinning medication is called Brilinta, Eliquis, and is used in combination with aspirin to prevent clots from forming in your heart and/or around your stent.  Your doctor will determine how long you need to be on this medicine, but generally if you have a Drug Eluding stent, you will need to take this medication for at least one month, and you have a Bare Metal stent, you will need the medication for at least 3 months.  Some patients require this medication indefinitely.  Beta-Blocker - Beta-Blocker Metoprolol is used to lower blood pressure and heart rate, and/or helps your heart heal after a heart attack.    · Is patient discharged on Warfarin / Coumadin?   No      · Is patient Post Blood Transfusion?  No    Depression / Suicide Risk    As you are discharged from this [facility] facility, it is important to learn how to keep safe from harming yourself.    Recognize the warning signs:  · Abrupt changes in personality, positive or negative- including increase in energy   · Giving away possessions  · Change in eating patterns- significant weight changes-  positive or negative  · Change in sleeping patterns- unable to sleep or sleeping all the time   · Unwillingness or inability to communicate  · Depression  · Unusual sadness, discouragement and loneliness  · Talk of wanting to die  · Neglect of personal appearance   · Rebelliousness- reckless behavior  · Withdrawal from people/activities they love  · Confusion- inability to concentrate     If you or a loved one observes any of these behaviors or has concerns about self-harm, here's what you can do:  · Talk about it- your feelings and reasons for harming yourself  · Remove any means that you might use to hurt yourself (examples: pills, rope, extension cords, firearm)  · Get professional help from the community (Mental Health, Substance Abuse, psychological counseling)  · Do not be alone:Call your Safe Contact- someone whom you trust who will be there for you.  · Call your local CRISIS HOTLINE 258-2432 or 264-271-8609  · Call your local Children's Mobile Crisis Response Team [place] (828) 752-9222 or www.Migo.me  · Call the toll free National Suicide Prevention Hotlines   · National Suicide Prevention Lifeline 735-892-IJFO (9466)  · National Hope Line Network 800-SUICIDE (760-7943)      HF Patient Discharge Instructions  · Monitor your weight daily, and maintain a weight chart, to track your weight changes.   · Activity as tolerated, unless your Doctor has ordered otherwise. Other activity order: as tolerated.  · Follow a low fat, low cholesterol, low salt diet unless instructed otherwise by your Doctor. Read  the labels on the back of food products and track your intake of fat, cholesterol and salt.   · Fluid Restriction No. If a Fluid Restriction has been ordered by your Doctor, measure fluids with a measuring cup to ensure that you are not exceeding the restriction.   · No smoking.  · Oxygen Yes. If your Doctor has ordered that you wear Oxygen at home, it is important to wear it as ordered.  · Did you receive an explanation from staff on the importance of taking each of your medications and why it is necessary to keep taking them unless your doctor says to stop? Yes  · Were all of your questions answered about how to manage your heart failure and what to do if you have increased signs and symptoms after you go home? Yes  · Do you feel like your heart failure care team involved you in the care treatment plan and allowed you to make decisions regarding your care while in the hospital and addressed any discharge needs you might have? Yes    See the educational handout provided at discharge for more information on monitoring your daily weight, activity and diet. This also explains more about Heart Failure, symptoms of a flare-up and some of the tests that you have undergone.     Warning Signs of a Flare-Up include:  · Swelling in the ankles or lower legs.  · Shortness of breath, while at rest, or while doing normal activities.   · Shortness of breath at night when in bed, or coughing in bed.   · Requiring more pillows to sleep at night, or needing to sit up at night to sleep.  · Feeling weak, dizzy or fatigued.     When to call your Doctor:  · Call [facility] 7th floor about questions regarding the discharge instructions you were given [phone].  · Call your Primary Care Physician or Cardiologist if:   1. You experience any pain radiating to your jaw or neck.  2. You have any difficulty breathing.  3. You experience weight gain of 3 lbs in a day or 5 lbs in a week.   4. You feel any palpitations or irregular  heartbeats.  5. You become dizzy or lose consciousness.   If you have had an angiogram or had a pacemaker or AICD placed, and experience:  1. Bleeding, drainage or swelling at the surgical / puncture site.  2. Fever greater than 100.0 F  3. Shock from internal defibrillator.  4. Cool and / or numb extremities.  5.        Discharge Medication Instructions:    Below are the medications your physician expects you to take upon discharge:    Review all your home medications and newly ordered medications with your doctor and/or pharmacist. Follow medication instructions as directed by your doctor and/or pharmacist.    Please keep your medication list with you and share with your physician.               Medication List      START taking these medications        Instructions    aspirin 81 MG Chew chewable tablet   Last time this was given:  81 mg on 3/14/2017  9:48 AM   Commonly known as:  ASA    Take 1 Tab by mouth every day.   Dose:  81 mg       atorvastatin 40 MG Tabs   Commonly known as:  LIPITOR    Take 1 Tab by mouth every bedtime.   Dose:  40 mg       fludrocortisone 0.1 MG Tabs   Last time this was given:  0.1 mg on 3/14/2017  9:48 AM   Commonly known as:  FLORINEF    Take 1 Tab by mouth every morning.   Dose:  0.1 mg         CHANGE how you take these medications        Instructions    * BRILINTA 90 MG Tabs tablet   What changed:  Another medication with the same name was added. Make sure you understand how and when to take each.   Last time this was given:  90 mg on 3/14/2017  9:48 AM   Generic drug:  ticagrelor    Take 90 mg by mouth 2 Times a Day.   Dose:  90 mg       * ticagrelor 90 MG Tabs tablet   What changed:  You were already taking a medication with the same name, and this prescription was added. Make sure you understand how and when to take each.   Last time this was given:  90 mg on 3/14/2017  9:48 AM   Commonly known as:  BRILINTA    Take 1 Tab by mouth 2 Times a Day.   Dose:  90 mg       hydroxyurea  500 MG Caps   What changed:  how much to take   Last time this was given:  1,000 mg on 3/13/2017  9:42 PM   Commonly known as:  HYDREA    Take 3 Caps by mouth every day.   Dose:  1500 mg       * Notice:  This list has 2 medication(s) that are the same as other medications prescribed for you. Read the directions carefully, and ask your doctor or other care provider to review them with you.      CONTINUE taking these medications        Instructions    apixaban 5mg Tabs   Last time this was given:  5 mg on 3/14/2017  9:47 AM   Commonly known as:  ELIQUIS    Take 1 Tab by mouth 2 Times a Day.   Dose:  5 mg       CIALIS 5 MG tablet   Generic drug:  tadalafil    Take 5 mg by mouth every day.   Dose:  5 mg       colesevelam 625 MG Tabs   Last time this was given:  625 mg on 3/14/2017  7:30 AM   Commonly known as:  WELCHOL    Take 625 mg by mouth 2 times a day, with meals.   Dose:  625 mg       diphenhydrAMINE 50 MG Caps   Commonly known as:  BENADRYL    Take 50 mg by mouth every 6 hours as needed.   Dose:  50 mg       finasteride 5 MG Tabs   Last time this was given:  5 mg on 3/14/2017  9:47 AM   Commonly known as:  PROSCAR    Take 5 mg by mouth every day.   Dose:  5 mg       glipiZIDE 10 MG Tabs   Last time this was given:  10 mg on 3/13/2017  9:45 PM   Commonly known as:  GLUCOTROL    Take 10 mg by mouth every evening.   Dose:  10 mg       insulin glargine 100 UNIT/ML Soln   Last time this was given:  20 Units on 3/13/2017  9:43 PM   Commonly known as:  LANTUS    Inject 20 Units as instructed every evening.   Dose:  20 Units       metoprolol SR 25 MG Tb24   Last time this was given:  25 mg on 3/14/2017  9:48 AM   Commonly known as:  TOPROL XL    Take 1 Tab by mouth every day.   Dose:  25 mg       mupirocin calcium 2 % Crea   Commonly known as:  BACTROBAN    Apply twice a day and/ or after each washing to affected area.       sulfamethoxazole-trimethoprim 800-160 MG tablet   Commonly known as:  BACTRIM DS    Take 1 Tab by  mouth every 12 hours. Stay hydrated   Dose:  1 Tab       tamsulosin 0.4 MG capsule   Last time this was given:  0.4 mg on 3/14/2017  9:00 AM   Commonly known as:  FLOMAX    Take 0.4 mg by mouth 2 Times a Day.   Dose:  0.4 mg               Instructions           Diet / Nutrition:    Follow any diet instructions given to you by your doctor or the dietician, including how much salt (sodium) you are allowed each day.    If you are overweight, talk to your doctor about a weight reduction plan.    Activity:    Remain physically active following your doctor's instructions about exercise and activity.    Rest often.     Any time you become even a little tired or short of breath, SIT DOWN and rest.    Worsening Symptoms:    Report any of the following signs and symptoms to the doctor's office immediately:    *Pain of jaw, arm, or neck  *Chest pain not relieved by medication                               *Dizziness or loss of consciousness  *Difficulty breathing even when at rest   *More tired than usual                                       *Bleeding drainage or swelling of surgical site  *Swelling of feet, ankles, legs or stomach                 *Fever (>100ºF)  *Pink or blood tinged sputum  *Weight gain (3lbs/day or 5lbs /week)           *Shock from internal defibrillator (if applicable)  *Palpitations or irregular heartbeats                *Cool and/or numb extremities    Stroke Awareness    Common Risk Factors for Stroke include:    Age  Atrial Fibrillation  Carotid Artery Stenosis  Diabetes Mellitus  Excessive alcohol consumption  High blood pressure  Overweight   Physical inactivity  Smoking    Warning signs and symptoms of a stroke include:    *Sudden numbness or weakness of the face, arm or leg (especially on one side of the body).  *Sudden confusion, trouble speaking or understanding.  *Sudden trouble seeing in one or both eyes.  *Sudden trouble walking, dizziness, loss of balance or coordination.Sudden severe  headache with no known cause.    It is very important to get treatment quickly when a stroke occurs. If you experience any of the above warning signs, call 911 immediately.                   Disclaimer         Quit Smoking / Tobacco Use:    I understand the use of any tobacco products increases my chance of suffering from future heart disease or stroke and could cause other illnesses which may shorten my life. Quitting the use of tobacco products is the single most important thing I can do to improve my health. For further information on smoking / tobacco cessation call a Toll Free Quit Line at 1-766.980.4941 (*National Cancer [place]) or 1-693.647.1870 (American Lung Association) or you can access the web based program at www.lungusa.org.    [place] Tobacco Users Help Line:  (213) 229-5429       Toll Free: 1-229.104.5114  Quit Tobacco Program [facility] Management Services (858)606-3787    Crisis Hotline:    [place] Crisis Hotline:  2-762-EIXAUWI or 1-274.775.6761    [place] Crisis Hotline:    1-627.698.6966 or 379-401-2610    Discharge Survey:   Thank you for choosing [facility]. We hope we did everything we could to make your hospital stay a pleasant one. You may be receiving a phone survey and we would appreciate your time and participation in answering the questions. Your input is very valuable to us in our efforts to improve our service to our patients and their families.        My signature on this form indicates that:    1. I have reviewed and understand the above information.  2. My questions regarding this information have been answered to my satisfaction.  3. I have formulated a plan with my discharge nurse to obtain my prescribed medications for home.                  Disclaimer         __________________________________                     __________       ________                       Patient Signature                                                 Date                    Time

## 2017-03-11 NOTE — IP AVS SNAPSHOT
3/14/2017          [address]ancesco [address]  [facility] [address]    Dear Francesco:    [facility] wants to ensure your discharge home is safe and you or your loved ones have had all your questions answered regarding your care after you leave the hospital.    You may receive a telephone call within two days of your discharge.  This call is to make certain you understand your discharge instructions as well as ensure we provided you with the best care possible during your stay with us.     The call will only last approximately 3-5 minutes and will be done by a nurse.    Once again, we want to ensure your discharge home is safe and that you have a clear understanding of any next steps in your care.  If you have any questions or concerns, please do not hesitate to contact us, we are here for you.  Thank you for choosing [facility] for your healthcare needs.    Sincerely,    Xavier Guzman    [facility]

## 2017-03-11 NOTE — IP AVS SNAPSHOT
Turpitude Access Code: [email or web address]  Expires: 4/8/2017 12:47 PM    Turpitude  A secure, online tool to manage your health information     [employer]’s Turpitude® is a secure, online tool that connects you to your personalized health information from the privacy of your home -- day or night - making it very easy for you to manage your healthcare. Once the activation process is completed, you can even access your medical information using the Turpitude rayna, which is available for free in the Apple Rayna store or Google Play store.     Turpitude provides the following levels of access (as shown below):   My Chart Features   [facility] Primary Care Doctor [facility]  Specialists [facility]  Urgent  Care Non-[facility]  Primary Care  Doctor   Email your healthcare team securely and privately 24/7 X X X X   Manage appointments: schedule your next appointment; view details of past/upcoming appointments X      Request prescription refills. X      View recent personal medical records, including lab and immunizations X X X X   View health record, including health history, allergies, medications X X X X   Read reports about your outpatient visits, procedures, consult and ER notes X X X X   See your discharge summary, which is a recap of your hospital and/or ER visit that includes your diagnosis, lab results, and care plan. X X       How to register for Turpitude:  1. Go to  [email or web address].  2. Click on the Sign Up Now box, which takes you to the New Member Sign Up page. You will need to provide the following information:  a. Enter your Turpitude Access Code exactly as it appears at the top of this page. (You will not need to use this code after you’ve completed the sign-up process. If you do not sign up before the expiration date, you must request a new code.)   b. Enter your date of birth.   c. Enter your home email address.   d. Click Submit, and follow the next screen’s instructions.  3. Create a Turpitude ID. This will be your Turpitude  login ID and cannot be changed, so think of one that is secure and easy to remember.  4. Create a Tandem password. You can change your password at any time.  5. Enter your Password Reset Question and Answer. This can be used at a later time if you forget your password.   6. Enter your e-mail address. This allows you to receive e-mail notifications when new information is available in Tandem.  7. Click Sign Up. You can now view your health information.    For assistance activating your Tandem account, call [phone]

## 2017-03-11 NOTE — ED NOTES
The Medication Reconciliation process has been completed by interviewing the patient who reports that he is a [facility] pt.      Allergies have been reviewed  Antibiotic use in 30 days - Completed a septra on 3/4/17    Home Pharmacy:  [facility]

## 2017-03-11 NOTE — ED NOTES
Pt brought self in with c/o L sided CP that radiates down L arm and SOB that started 45 mins ago. Pt has blood clotting disorder and has hx of 4 cardiac stents.

## 2017-03-11 NOTE — ED NOTES
Pt took aspirin and nitro PTA. ERP saw pt. Code STEMI called at 1517. 2 large bore IV's started, labs drawn and sent Fentanyl given. Esthela mukherjee RN at [facility] ED called and made aware of pt tx.

## 2017-03-12 PROBLEM — N40.0 BPH (BENIGN PROSTATIC HYPERPLASIA): Status: ACTIVE | Noted: 2017-03-12

## 2017-03-12 PROBLEM — Z78.9 STATIN INTOLERANCE: Status: ACTIVE | Noted: 2017-03-12

## 2017-03-12 PROBLEM — I48.0 PAROXYSMAL ATRIAL FIBRILLATION (HCC): Status: ACTIVE | Noted: 2017-03-12

## 2017-03-12 PROBLEM — D75.839 THROMBOCYTOSIS: Status: ACTIVE | Noted: 2017-03-12

## 2017-03-12 PROBLEM — N17.9 AKI (ACUTE KIDNEY INJURY) (HCC): Status: ACTIVE | Noted: 2017-03-12

## 2017-03-12 PROBLEM — E78.5 DYSLIPIDEMIA: Status: ACTIVE | Noted: 2017-03-12

## 2017-03-12 LAB
ANION GAP SERPL CALC-SCNC: 7 MMOL/L (ref 0–11.9)
ANISOCYTOSIS BLD QL SMEAR: ABNORMAL
BASOPHILS # BLD AUTO: 0.8 % (ref 0–1.8)
BASOPHILS # BLD: 0.13 K/UL (ref 0–0.12)
BUN SERPL-MCNC: 24 MG/DL (ref 8–22)
CALCIUM SERPL-MCNC: 8.6 MG/DL (ref 8.5–10.5)
CFT BLD TEG: 8 MIN (ref 5–10)
CHLORIDE SERPL-SCNC: 100 MMOL/L (ref 96–112)
CHOLEST SERPL-MCNC: 70 MG/DL (ref 100–199)
CLOT ANGLE BLD TEG: 69.1 DEGREES (ref 53–72)
CLOT LYSIS 30M P MA LENFR BLD TEG: 14.9 % (ref 0–8)
CO2 SERPL-SCNC: 19 MMOL/L (ref 20–33)
CORTIS SERPL-MCNC: 12.6 UG/DL (ref 0–23)
CREAT SERPL-MCNC: 1.63 MG/DL (ref 0.5–1.4)
CT.EXTRINSIC BLD ROTEM: 1.5 MIN (ref 1–3)
EKG IMPRESSION: NORMAL
EOSINOPHIL # BLD AUTO: 0 K/UL (ref 0–0.51)
EOSINOPHIL NFR BLD: 0 % (ref 0–6.9)
ERYTHROCYTE [DISTWIDTH] IN BLOOD BY AUTOMATED COUNT: 39.8 FL (ref 35.9–50)
GFR SERPL CREATININE-BSD FRML MDRD: 43 ML/MIN/1.73 M 2
GIANT PLATELETS BLD QL SMEAR: NORMAL
GLUCOSE BLD-MCNC: 194 MG/DL (ref 65–99)
GLUCOSE BLD-MCNC: 196 MG/DL (ref 65–99)
GLUCOSE BLD-MCNC: 204 MG/DL (ref 65–99)
GLUCOSE BLD-MCNC: 361 MG/DL (ref 65–99)
GLUCOSE BLD-MCNC: 364 MG/DL (ref 65–99)
GLUCOSE SERPL-MCNC: 195 MG/DL (ref 65–99)
HCT VFR BLD AUTO: 35.3 % (ref 42–52)
HDLC SERPL-MCNC: 22 MG/DL
HGB BLD-MCNC: 10.6 G/DL (ref 14–18)
HYPOCHROMIA BLD QL SMEAR: ABNORMAL
LDLC SERPL CALC-MCNC: 37 MG/DL
LG PLATELETS BLD QL SMEAR: NORMAL
LV EJECT FRACT  99904: 45
LV EJECT FRACT MOD 2C 99903: 37.62
LV EJECT FRACT MOD 4C 99902: 40.71
LV EJECT FRACT MOD BP 99901: 36.63
LYMPHOCYTES # BLD AUTO: 0.15 K/UL (ref 1–4.8)
LYMPHOCYTES NFR BLD: 0.9 % (ref 22–41)
MANUAL DIFF BLD: NORMAL
MCF BLD TEG: 69.3 MM (ref 50–70)
MCH RBC QN AUTO: 18.6 PG (ref 27–33)
MCHC RBC AUTO-ENTMCNC: 30 G/DL (ref 33.7–35.3)
MCV RBC AUTO: 62 FL (ref 81.4–97.8)
MICROCYTES BLD QL SMEAR: ABNORMAL
MONOCYTES # BLD AUTO: 0 K/UL (ref 0–0.85)
MONOCYTES NFR BLD AUTO: 0 % (ref 0–13.4)
MORPHOLOGY BLD-IMP: NORMAL
NEUTROPHILS # BLD AUTO: 16.42 K/UL (ref 1.82–7.42)
NEUTROPHILS NFR BLD: 94.8 % (ref 44–72)
NEUTS BAND NFR BLD MANUAL: 3.5 % (ref 0–10)
NRBC # BLD AUTO: 0.03 K/UL
NRBC BLD AUTO-RTO: 0.2 /100 WBC
OVALOCYTES BLD QL SMEAR: NORMAL
PA AA BLD-ACNC: 97.6 %
PA ADP BLD-ACNC: 100 %
PLATELET # BLD AUTO: 965 K/UL (ref 164–446)
PLATELET BLD QL SMEAR: NORMAL
PMV BLD AUTO: 9.8 FL (ref 9–12.9)
POIKILOCYTOSIS BLD QL SMEAR: NORMAL
POLYCHROMASIA BLD QL SMEAR: NORMAL
POTASSIUM SERPL-SCNC: 4 MMOL/L (ref 3.6–5.5)
RBC # BLD AUTO: 5.69 M/UL (ref 4.7–6.1)
RBC BLD AUTO: PRESENT
SODIUM SERPL-SCNC: 126 MMOL/L (ref 135–145)
TEG ALGORITHM TGALG: ABNORMAL
TRIGL SERPL-MCNC: 55 MG/DL (ref 0–149)
TROPONIN I SERPL-MCNC: 23.91 NG/ML (ref 0–0.04)
TROPONIN I SERPL-MCNC: 29.5 NG/ML (ref 0–0.04)
WBC # BLD AUTO: 16.7 K/UL (ref 4.8–10.8)

## 2017-03-12 PROCEDURE — 700111 HCHG RX REV CODE 636 W/ 250 OVERRIDE (IP): Performed by: INTERNAL MEDICINE

## 2017-03-12 PROCEDURE — 80048 BASIC METABOLIC PNL TOTAL CA: CPT

## 2017-03-12 PROCEDURE — A9270 NON-COVERED ITEM OR SERVICE: HCPCS | Performed by: INTERNAL MEDICINE

## 2017-03-12 PROCEDURE — 93306 TTE W/DOPPLER COMPLETE: CPT

## 2017-03-12 PROCEDURE — 700102 HCHG RX REV CODE 250 W/ 637 OVERRIDE(OP): Performed by: HOSPITALIST

## 2017-03-12 PROCEDURE — 700105 HCHG RX REV CODE 258

## 2017-03-12 PROCEDURE — 93010 ELECTROCARDIOGRAM REPORT: CPT | Performed by: INTERNAL MEDICINE

## 2017-03-12 PROCEDURE — 700102 HCHG RX REV CODE 250 W/ 637 OVERRIDE(OP): Performed by: INTERNAL MEDICINE

## 2017-03-12 PROCEDURE — 85027 COMPLETE CBC AUTOMATED: CPT

## 2017-03-12 PROCEDURE — 85347 COAGULATION TIME ACTIVATED: CPT

## 2017-03-12 PROCEDURE — 700111 HCHG RX REV CODE 636 W/ 250 OVERRIDE (IP): Performed by: HOSPITALIST

## 2017-03-12 PROCEDURE — 700105 HCHG RX REV CODE 258: Performed by: HOSPITALIST

## 2017-03-12 PROCEDURE — 85007 BL SMEAR W/DIFF WBC COUNT: CPT

## 2017-03-12 PROCEDURE — A9270 NON-COVERED ITEM OR SERVICE: HCPCS | Performed by: HOSPITALIST

## 2017-03-12 PROCEDURE — 82533 TOTAL CORTISOL: CPT

## 2017-03-12 PROCEDURE — 700105 HCHG RX REV CODE 258: Performed by: INTERNAL MEDICINE

## 2017-03-12 PROCEDURE — 85384 FIBRINOGEN ACTIVITY: CPT

## 2017-03-12 PROCEDURE — 93306 TTE W/DOPPLER COMPLETE: CPT | Mod: 26 | Performed by: INTERNAL MEDICINE

## 2017-03-12 PROCEDURE — 82962 GLUCOSE BLOOD TEST: CPT | Mod: 91

## 2017-03-12 PROCEDURE — 84484 ASSAY OF TROPONIN QUANT: CPT

## 2017-03-12 PROCEDURE — 99233 SBSQ HOSP IP/OBS HIGH 50: CPT | Performed by: HOSPITALIST

## 2017-03-12 PROCEDURE — 93005 ELECTROCARDIOGRAM TRACING: CPT | Performed by: INTERNAL MEDICINE

## 2017-03-12 PROCEDURE — 80061 LIPID PANEL: CPT

## 2017-03-12 PROCEDURE — 770020 HCHG ROOM/CARE - TELE (206)

## 2017-03-12 PROCEDURE — 85576 BLOOD PLATELET AGGREGATION: CPT

## 2017-03-12 RX ORDER — HYDROXYUREA 500 MG/1
500 CAPSULE ORAL DAILY
Status: DISCONTINUED | OUTPATIENT
Start: 2017-03-12 | End: 2017-03-12

## 2017-03-12 RX ORDER — FINASTERIDE 5 MG/1
5 TABLET, FILM COATED ORAL DAILY
Status: DISCONTINUED | OUTPATIENT
Start: 2017-03-12 | End: 2017-03-14 | Stop reason: HOSPADM

## 2017-03-12 RX ORDER — SODIUM CHLORIDE 9 MG/ML
500 INJECTION, SOLUTION INTRAVENOUS ONCE
Status: COMPLETED | OUTPATIENT
Start: 2017-03-12 | End: 2017-03-12

## 2017-03-12 RX ORDER — AMOXICILLIN 250 MG
1 CAPSULE ORAL
Status: DISCONTINUED | OUTPATIENT
Start: 2017-03-12 | End: 2017-03-14 | Stop reason: HOSPADM

## 2017-03-12 RX ORDER — TAMSULOSIN HYDROCHLORIDE 0.4 MG/1
0.4 CAPSULE ORAL 2 TIMES DAILY
Status: DISCONTINUED | OUTPATIENT
Start: 2017-03-12 | End: 2017-03-14 | Stop reason: HOSPADM

## 2017-03-12 RX ORDER — FERROUS SULFATE 325(65) MG
325 TABLET ORAL
Status: DISCONTINUED | OUTPATIENT
Start: 2017-03-12 | End: 2017-03-14 | Stop reason: HOSPADM

## 2017-03-12 RX ORDER — COLESEVELAM 180 1/1
625 TABLET ORAL 2 TIMES DAILY WITH MEALS
Status: DISCONTINUED | OUTPATIENT
Start: 2017-03-12 | End: 2017-03-14 | Stop reason: HOSPADM

## 2017-03-12 RX ORDER — GLIPIZIDE 5 MG/1
10 TABLET ORAL EVERY EVENING
Status: DISCONTINUED | OUTPATIENT
Start: 2017-03-12 | End: 2017-03-14 | Stop reason: HOSPADM

## 2017-03-12 RX ORDER — TAMSULOSIN HYDROCHLORIDE 0.4 MG/1
0.4 CAPSULE ORAL ONCE
Status: COMPLETED | OUTPATIENT
Start: 2017-03-12 | End: 2017-03-12

## 2017-03-12 RX ORDER — FLUDROCORTISONE ACETATE 0.1 MG/1
0.1 TABLET ORAL EVERY MORNING
Status: DISCONTINUED | OUTPATIENT
Start: 2017-03-12 | End: 2017-03-14 | Stop reason: HOSPADM

## 2017-03-12 RX ORDER — SODIUM CHLORIDE 9 MG/ML
INJECTION, SOLUTION INTRAVENOUS
Status: COMPLETED
Start: 2017-03-12 | End: 2017-03-12

## 2017-03-12 RX ORDER — SODIUM CHLORIDE 9 MG/ML
INJECTION, SOLUTION INTRAVENOUS
Status: ACTIVE
Start: 2017-03-12 | End: 2017-03-12

## 2017-03-12 RX ORDER — HYDROXYUREA 500 MG/1
500 CAPSULE ORAL DAILY
Status: ON HOLD | COMMUNITY
End: 2017-03-14

## 2017-03-12 RX ORDER — SODIUM CHLORIDE 9 MG/ML
500 INJECTION, SOLUTION INTRAVENOUS ONCE
Status: ACTIVE | OUTPATIENT
Start: 2017-03-12 | End: 2017-03-13

## 2017-03-12 RX ORDER — INSULIN GLARGINE 100 [IU]/ML
20 INJECTION, SOLUTION SUBCUTANEOUS NIGHTLY
Status: DISCONTINUED | OUTPATIENT
Start: 2017-03-12 | End: 2017-03-14 | Stop reason: HOSPADM

## 2017-03-12 RX ORDER — HYDROXYUREA 500 MG/1
1000 CAPSULE ORAL DAILY
Status: DISCONTINUED | OUTPATIENT
Start: 2017-03-13 | End: 2017-03-14 | Stop reason: HOSPADM

## 2017-03-12 RX ORDER — MORPHINE SULFATE 4 MG/ML
2 INJECTION, SOLUTION INTRAMUSCULAR; INTRAVENOUS EVERY 4 HOURS PRN
Status: DISCONTINUED | OUTPATIENT
Start: 2017-03-12 | End: 2017-03-14 | Stop reason: HOSPADM

## 2017-03-12 RX ORDER — HYDROXYUREA 500 MG/1
500 CAPSULE ORAL ONCE
Status: COMPLETED | OUTPATIENT
Start: 2017-03-12 | End: 2017-03-12

## 2017-03-12 RX ORDER — SODIUM CHLORIDE 9 MG/ML
500 INJECTION, SOLUTION INTRAVENOUS ONCE
Status: DISCONTINUED | OUTPATIENT
Start: 2017-03-12 | End: 2017-03-12

## 2017-03-12 RX ADMIN — FLUDROCORTISONE ACETATE 0.1 MG: 0.1 TABLET ORAL at 11:15

## 2017-03-12 RX ADMIN — INSULIN LISPRO 2 UNITS: 100 INJECTION, SOLUTION INTRAVENOUS; SUBCUTANEOUS at 23:38

## 2017-03-12 RX ADMIN — FINASTERIDE 5 MG: 5 TABLET, FILM COATED ORAL at 12:55

## 2017-03-12 RX ADMIN — APIXABAN 5 MG: 5 TABLET, FILM COATED ORAL at 12:55

## 2017-03-12 RX ADMIN — TICAGRELOR 90 MG: 90 TABLET ORAL at 22:14

## 2017-03-12 RX ADMIN — STANDARDIZED SENNA CONCENTRATE AND DOCUSATE SODIUM 1 TABLET: 8.6; 5 TABLET, FILM COATED ORAL at 15:14

## 2017-03-12 RX ADMIN — SODIUM CHLORIDE 500 ML: 9 INJECTION, SOLUTION INTRAVENOUS at 09:48

## 2017-03-12 RX ADMIN — MORPHINE SULFATE 2 MG: 4 INJECTION INTRAVENOUS at 22:10

## 2017-03-12 RX ADMIN — EPTIFIBATIDE 2 MCG/KG/MIN: 0.75 INJECTION, SOLUTION INTRAVENOUS at 01:13

## 2017-03-12 RX ADMIN — INSULIN GLARGINE 20 UNITS: 100 INJECTION, SOLUTION SUBCUTANEOUS at 22:22

## 2017-03-12 RX ADMIN — TAMSULOSIN HYDROCHLORIDE 0.4 MG: 0.4 CAPSULE ORAL at 07:38

## 2017-03-12 RX ADMIN — GLIPIZIDE 10 MG: 10 TABLET ORAL at 22:15

## 2017-03-12 RX ADMIN — TAMSULOSIN HYDROCHLORIDE 0.4 MG: 0.4 CAPSULE ORAL at 15:14

## 2017-03-12 RX ADMIN — SODIUM CHLORIDE 500 ML: 9 INJECTION, SOLUTION INTRAVENOUS at 12:59

## 2017-03-12 RX ADMIN — Medication 325 MG: at 22:13

## 2017-03-12 RX ADMIN — TAMSULOSIN HYDROCHLORIDE 0.4 MG: 0.4 CAPSULE ORAL at 22:14

## 2017-03-12 RX ADMIN — SODIUM CHLORIDE: 9 INJECTION, SOLUTION INTRAVENOUS at 23:30

## 2017-03-12 RX ADMIN — HYDROXYUREA 500 MG: 500 CAPSULE ORAL at 22:14

## 2017-03-12 RX ADMIN — HYDROXYUREA 500 MG: 500 CAPSULE ORAL at 15:14

## 2017-03-12 RX ADMIN — SODIUM CHLORIDE: 9 INJECTION, SOLUTION INTRAVENOUS at 11:15

## 2017-03-12 RX ADMIN — INSULIN LISPRO 3 UNITS: 100 INJECTION, SOLUTION INTRAVENOUS; SUBCUTANEOUS at 11:19

## 2017-03-12 RX ADMIN — ASPIRIN 81 MG: 81 TABLET, CHEWABLE ORAL at 07:39

## 2017-03-12 RX ADMIN — APIXABAN 5 MG: 5 TABLET, FILM COATED ORAL at 22:12

## 2017-03-12 RX ADMIN — COLESEVELAM HYDROCHLORIDE 625 MG: 625 TABLET, FILM COATED ORAL at 17:56

## 2017-03-12 RX ADMIN — INSULIN LISPRO 8 UNITS: 100 INJECTION, SOLUTION INTRAVENOUS; SUBCUTANEOUS at 00:10

## 2017-03-12 RX ADMIN — TICAGRELOR 90 MG: 90 TABLET ORAL at 07:39

## 2017-03-12 RX ADMIN — SODIUM CHLORIDE 500 ML: 9 INJECTION, SOLUTION INTRAVENOUS at 09:11

## 2017-03-12 RX ADMIN — INSULIN LISPRO 2 UNITS: 100 INJECTION, SOLUTION INTRAVENOUS; SUBCUTANEOUS at 17:56

## 2017-03-12 RX ADMIN — INSULIN LISPRO 2 UNITS: 100 INJECTION, SOLUTION INTRAVENOUS; SUBCUTANEOUS at 06:07

## 2017-03-12 ASSESSMENT — ENCOUNTER SYMPTOMS
COUGH: 0
GASTROINTESTINAL NEGATIVE: 1
CARDIOVASCULAR NEGATIVE: 1
SHORTNESS OF BREATH: 0
PND: 0
PALPITATIONS: 0
EYES NEGATIVE: 1
RESPIRATORY NEGATIVE: 1
PSYCHIATRIC NEGATIVE: 1
MUSCULOSKELETAL NEGATIVE: 1
WEIGHT LOSS: 0
WEAKNESS: 1
DIZZINESS: 0

## 2017-03-12 ASSESSMENT — CHA2DS2 SCORE
AGE 75 OR GREATER: NO
VASCULAR DISEASE: YES
HYPERTENSION: NO
DIABETES: YES
SEX: MALE
CHF OR LEFT VENTRICULAR DYSFUNCTION: NO
PRIOR STROKE OR TIA OR THROMBOEMBOLISM: NO
AGE 65 TO 74: NO
CHA2DS2 VASC SCORE: 2

## 2017-03-12 ASSESSMENT — PAIN SCALES - GENERAL
PAINLEVEL_OUTOF10: 0
PAINLEVEL_OUTOF10: 3
PAINLEVEL_OUTOF10: 0

## 2017-03-12 NOTE — ED NOTES
Monse from Lab called with critical result of platelets of 1,160 at 1614. Critical lab result read back to Monse.   Charge JOSLYN ruano at [facility] ED, called and made aware of labs

## 2017-03-12 NOTE — ED PROVIDER NOTES
"ED Provider Note    CHIEF COMPLAINT  Code STEMI    [facility]  Francesco Schulte is a 66 y.o. male who presents evaluation of chest pain.  The patient states approximately an hour and a half ago he developed left-sided chest pain with pressure shortness of breath and mild diaphoresis.  He does have a history of coronary artery disease has had 3 previous stents placed in [place], the last one being approximately one year ago.  He also has a history of polycythemia along with paroxysmal atrial fibrillation.  He is currently on Eloquis.  Patient was seen at [facility] where an EKG showed ST elevation anteriorly.  The patient was transferred here seen as a code STEMI.  The patient was met by the code STEMI team as well as cardiology.  The patient is continuing to have pain.  He describes the pain as pressure in the left side of his chest with no pain to the back.  There's been no recent: Fever, URI symptoms, cough, sputum, gastrointestinal symptoms, syncope.  No other acute symptomatology or complaints.    REVIEW OF SYSTEMS  See HPI for further details.  No history of: Eri disease, seizures, cancer, stroke.  All other systems negative.    PAST MEDICAL HISTORY  1.  Diabetes mellitus  2.  Coronary artery disease  3.  Paroxysmal atrial fibrillation  4.  Polycythemia    FAMILY HISTORY  No family history on file.    SOCIAL HISTORY  Nonsmoker;    SURGICAL HISTORY  Past Surgical History   Procedure Laterality Date   • Other cardiac surgery       stents x 3       CURRENT MEDICATIONS  See nurses notes    ALLERGIES  Allergies   Allergen Reactions   • Metformin Unspecified     \"Similar to a heart attack, I was hospitalized\"       PHYSICAL EXAM  VITAL SIGNS: see nurse's notes  Constitutional: 66-year-old  male Well developed, Well nourished, appears weak and mildly pale but oriented ×3 HENT: ,Atraumatic, Bilateral external ears normal, tympanic membranes clear, Oropharynx moist, No oral exudates, Nose normal.   Eyes: " PERRL, EOMI, Conjunctiva normal, No discharge.   Neck: Normal range of motion, No tenderness, Supple, No stridor.   Lymphatic: No lymphadenopathy noted.   Cardiovascular: Normal heart rate, Normal rhythm, No murmurs, No rubs, No gallops.   Thorax & Lungs: Normal Equal breath sounds, No respiratory distress, No wheezing, no stridor, no rales. No chest tenderness.   Abdomen: Soft, nontender, nondistended, no organomegaly, positive bowel sounds normal in quality. No guarding or rebound.  Skin: Good skin turgor, pink, warm, dry. No rashes, petechiae, purpura. Normal capillary refill.   Back: No tenderness, No CVA tenderness.   Extremities: Intact distal pulses, No edema, No tenderness, No cyanosis, No clubbing. Vascular: Pulses are 2+, symmetric in the upper and lower extremities.  Neurologic: Alert & oriented x 3, Normal motor function, Normal sensory function, No gross focal deficits noted.   Psychiatric: Affect normal, Judgment normal, Mood normal.       EKG  I have interpreted: Rate 80, rhythm sinus, axis normal, P-R QRS Q-T intervals normal, Q waves with ST elevation in V1-V5 consistent with anterior myocardial infarction;      COURSE & MEDICAL DECISION MAKING  Pertinent Labs & Imaging studies reviewed. (See chart for details)  1.  Monitor  2.  Saline lock  3.  Aspirin administered prior to arrival  4.  Zofran 4 mg IV  5.  Morphine 5 mg IV    Laboratory studies: CBC shows white count 16.8, 97% neutrophils, 1.7% metamyelocytes, hemoglobin 12.3, hematocrit 41.7, platelet count 1053; CMP shows sodium 133, glucose 251, creatinine 1.76, otherwise within normal limits; PTT 19.8/1.63, PTT 31; troponin 0.02; ;    Discussion/consultation: At this time, the patient presents for evaluation of chest pain.  The patient has ST elevation anteriorly consistent with potential anterior myocardial infarction.  The patient does have associated thrombocytosis as well as renal insufficiency and diabetes mellitus.  The patient was  evaluated by cardiology.  The patient will be going to the cardiac catheterization lab for emergent intervention.    FINAL IMPRESSION  1. ST elevation myocardial infarction (STEMI), unspecified artery (CMS-HCC)    2. Polycythemia    3. Paroxysmal atrial fibrillation (CMS-HCC)    4. Diabetes mellitus of other type with complication (HCC)    5. Thrombocytosis (CMS-HCC)    6. Renal insufficiency        PLAN  1.  The patient will be admitted for further monitoring, treatment, and care.    Electronically signed by: Guy G Gansert, 3/11/2017 4:11 PM

## 2017-03-12 NOTE — CONSULTS
DATE OF SERVICE:  03/11/2017    REFERRING PHYSICIAN:  Adin Neil MD    CHIEF COMPLAINT:  Hypotension.    HISTORY OF PRESENT ILLNESS:  This is a 66-year-old male who came in today with   acute anterior wall MI, status post angioplasty with a stent placement at the   proximal LAD and mid distal LAD.  The patient was noted to be hypotensive   with a blood pressure around 79/45.  Initially, patient was on Levophed drip,   but that was discontinued.  The patient is completely asymptomatic.  According   to the patient, his blood pressure always runs low.  Denies any chest pain.    No dizziness.  No shortness of breath.    PAST MEDICAL HISTORY:  Hyperlipidemia, polycythemia vera, atrial fibrillation,   diabetes type 2, BPH, coronary artery disease.    PAST SURGICAL HISTORY:  He had a stent placement back in 03/31/2016 x1,   phlebotomy every 2 months, chest tube insertion several years ago from an   accident.    SOCIAL HISTORY:  Denies smoking.  He drinks 1 glass of wine per week.  Denies   illicit drug use.    FAMILY HISTORY:  Mother's side were all diabetics.    ALLERGIES:  TO METFORMIN.    HOME MEDICATIONS:  Eliquis 5 mg b.i.d., Welchol 625 mg b.i.d., Benadryl 50 mg   every 6 hours as needed, Proscar 5 mg daily, Glucotrol 10 mg daily, Lantus 20   units q. evening, Metoprolol XL 25 mg daily, Bactroban cream apply twice a day in the   affected area, Cialis 5 mg daily, tamsulosin 0.4 mg b.i.d., Brilinta 90 mg   b.i.d.    REVIEW OF SYSTEMS:  All other systems reviewed were all negative.    PHYSICAL EXAMINATION:  VITAL SIGNS:  Blood pressure is 79/45, heart rate of 94, respiratory rate 22,   oxygen is 98% on 3 liters of oxygen, temperature is 36.9.  GENERAL:  The patient is a pleasant chato lying in bed comfortably, not in   distress.  HEENT:  Head normocephalic, atraumatic.  Eyes:  Pupils reactive to light,   anicteric sclerae.  Pinkish palpebral conjunctivae.  Oral mucosa, no oral   lesions noted, moist mucosa.  NECK:  No  JVD, no lymphadenopathy, no thyromegaly.  CHEST AND LUNGS:  Equal chest expansion.  Clear to auscultation bilaterally.    No crackles, no wheezing.  No tenderness to palpation.  CARDIOVASCULAR SYSTEM:  Tachycardic, S1 and S2 heard.  No murmurs noted.  GASTROINTESTINAL:  Positive bowel sounds.  No tenderness.  No   hepatosplenomegaly.  EXTREMITIES:  Pulses palpable in both upper and lower extremities.  No edema   noted.  He has a bandage noted in the right wrist area.  NEUROLOGIC:  Cranial nerves II-XII intact.  Alert and oriented x3.    LABORATORY DATA:  WBC 16.8, hemoglobin 12.3, hematocrit 41.7, platelet count   of 1053.  Sodium 133, potassium 4.1, chloride 100, CO2 of 22, anion gap of 11,   glucose 251, BUN 20, creatinine 1.76.  .  INR is 1.63.    IMAGING:  Chest x-ray shows no evidence of acute cardiopulmonary process.    ASSESSMENT AND PLAN:  1.  Hypotension.  Levophed was already discontinued.  Patient is completely   asymptomatic.  We will monitor for now, especially urine output, and we will   repeat BMP in the morning.  I will get an echocardiogram to assess his   ejection fraction.  2.  Polycythemia vera.  Patient is already on Brilinta and aspirin.  We repeat   BMP and CBC in the morning.  3.  Diabetes type 2.  We will put him on moderate sliding scale insulin.    Continue Lantus.  4.  Coronary artery disease.  Continue Brilinta, aspirin, and I will add   Lipitor 10 mg daily.  5.  Acute myocardial infarction per cardiology. S/P stenting of the LAD.   6.  Hyperlipidemia.  We will check lipid profile in the morning.  7.  Deep venous thrombosis.  I will put him on sequential compression devices.    Thank you for allowing us to participate in this patient's care.       ____________________________________     MD DANGELO Arredondo / DEANNA    DD:  03/11/2017 23:08:39  DT:  03/12/2017 00:38:18    D#:  [id number]  Job#:  [id number]

## 2017-03-12 NOTE — PROGRESS NOTES
Post MI with PTCA with June. Asked the hospiatlist to also see patient tonight.  On levophed and trying to taper.

## 2017-03-12 NOTE — PROGRESS NOTES
Orthostatic Blood Pressure:   Standin/45  Sittin/45  Standin/47    Second 500mL bolus started. Will wait to ambulate pt until pressures increase.

## 2017-03-12 NOTE — CATH LAB
Immediate Post-Operative Note      PreOp Diagnosis: STEMI. Previous PCI's.    PostOp Diagnosis: See below.    Procedure(s) :  Coronary Angiography, Left Heart Catheterization, Left Ventriculography. Aspiration thrombectomy. Proximal LAD stent. Mid/distal LAD stent.    Surgeon(s):  Neptali Watkins M.D.    Type of Anesthesia: Moderate Sedation    Specimen: None    Findings:   Thrombosed proximal LAD stents.  Proximal LAD stent 4.0 x 8 mm.  Mid/distal LAD stent 2.5 x 88 mm postdilated to 3.0 mm    Complications: None      Neptali Watkins M.D.  3/11/2017 8:27 PM

## 2017-03-12 NOTE — PROGRESS NOTES
Hospital Medicine Progress Note, Adult, Complex               Author: Billy Wagner Date & Time created: 3/12/2017  12:26 PM     Interval History:  Patient seen and examined today. ICU Care  Care and plan discussed in IDT/Hot rounds.  Lines and assistive devices reviewed.    Patient tolerating treatment and therapies.  All Data, Medication data reviewed.  Case discussed with nursing as available.  Plan of Care reviewed with patient and notified of changes.  67 y/o with CAD and recurrent stent closure, PV and h/o P-Afib, found hypotensive, which is chronic  3/12 Pt feels fatigued, s/p PCI and stent , on chronic eliquis for P-Afib  Cortisol is Nl this am, giving fluids with elevated Crea, PLT's very high, noted TEG with NL PFA despite on ASA and Brilinta    Review of Systems:  Review of Systems   Constitutional: Positive for malaise/fatigue. Negative for weight loss.   HENT: Negative.    Eyes: Negative.    Respiratory: Negative.    Cardiovascular: Negative.  Negative for chest pain and palpitations.   Gastrointestinal: Negative.    Genitourinary: Negative.    Musculoskeletal: Negative.    Skin: Negative.    Neurological: Positive for weakness.   Endo/Heme/Allergies: Negative.    Psychiatric/Behavioral: Negative.    All other systems reviewed and are negative.      Physical Exam:  Physical Exam    Labs:        Invalid input(s): [id number]  Recent Labs      03/11/17   1525  03/11/17   1602  03/12/17   0540   TROPONINI  <0.02  0.02  29.50*   BNPBTYPENAT  150*  146*   --      Recent Labs      03/11/17   1525  03/11/17   1602  03/12/17   0540   SODIUM  132*  133*  126*   POTASSIUM  3.6  4.1  4.0   CHLORIDE  97  100  100   CO2  18*  22  19*   BUN  21  20  24*   CREATININE  1.75*  1.76*  1.63*   CALCIUM  9.3  9.8  8.6     Recent Labs      03/11/17   1525  03/11/17   1602  03/12/17   0540   ALTSGPT  38  34   --    ASTSGOT  36  29   --    ALKPHOSPHAT  73  67   --    TBILIRUBIN  1.5  1.2   --    LIPASE   --   29   --     GLUCOSE  255*  251*  195*     Recent Labs      17   1525  17   1602  17   0540   RBC  6.59*  6.59*  5.69   HEMOGLOBIN  12.2*  12.3*  10.6*   HEMATOCRIT  42.1  41.7*  35.3*   PLATELETCT  1160*  1053*  965*   PROTHROMBTM  19.2*  19.8*   --    APTT  33.1  31.9   --    INR  1.64*  1.63*   --      Recent Labs      17   1525  17   1602  17   0540   WBC  16.9*  16.8*  16.7*   NEUTSPOLYS  95.40*  97.40*  94.80*   LYMPHOCYTES  1.40*  0.90*  0.90*   MONOCYTES  1.90  0.00  0.00   EOSINOPHILS  0.30  0.00  0.00   BASOPHILS  0.20  0.00  0.80   ASTSGOT  36  29   --    ALTSGPT  38  34   --    ALKPHOSPHAT  73  67   --    TBILIRUBIN  1.5  1.2   --            Hemodynamics:  Temp (24hrs), Av.1 °C (98.8 °F), Min:36.9 °C (98.4 °F), Max:37.4 °C (99.3 °F)  Temperature: 37 °C (98.6 °F)  Pulse  Av.4  Min: 68  Max: 103Heart Rate (Monitored): 68  NIBP: (!) 92/46 mmHg     Respiratory:    Respiration: 18, Pulse Oximetry: 92 %     Work Of Breathing / Effort: Mild  RUL Breath Sounds: Clear, RML Breath Sounds: Clear, RLL Breath Sounds: Diminished, EVELINA Breath Sounds: Clear, LLL Breath Sounds: Diminished  Fluids:    Intake/Output Summary (Last 24 hours) at 17 1226  Last data filed at 17 1000   Gross per 24 hour   Intake 2756.2 ml   Output   1525 ml   Net 1231.2 ml     Weight: 98.5 kg (217 lb 2.5 oz)  GI/Nutrition:  Orders Placed This Encounter   Procedures   • Diet Order     Standing Status: Standing      Number of Occurrences: 1      Standing Expiration Date:      Order Specific Question:  Diet:     Answer:  Cardiac [6]     Medical Decision Making, by Problem:  Active Hospital Problems    Diagnosis   • IDDM (insulin dependent diabetes mellitus) (CMS-[facility]) [E11.9, Z79.4] c/w lantus on orals   • BPH (benign prostatic hyperplasia) [N40.0] c/w proscar and Flomax   • Thrombocytosis (CMS-[facility]) [D47.3] severe with PV, will d/w Heme   • Dyslipidemia [E78.5] h/o statin allergy, on welchol   •  Paroxysmal atrial fibrillation (CMS-[facility]) [I48.0] in SR on BBl, c/w eliquis   • Statin intolerance [Z78.9] might be a future candidate for PCSK9 Tx   • STEMI (ST elevation myocardial infarction) (CMS-HCC) [I21.3] s/p PCI and stent recurrent   • Polycythemia [D75.1] on Hydra, not sufficiently controlled   -postural hypotension, give fluids, trial florinef  - Rian fluids for now, f/u am  Plan  Heme eval  Restart home meds  IVF's  Florinef  See orders  Await Echo  Cards. D/w dr Rodriguez  Time spent 35 min    Radiology images reviewed, Labs reviewed and Medications reviewed  Andres catheter: No Andres      DVT: eliquis.        Assessed for rehab: Patient returned to prior level of function, rehabilitation not indicated at this time

## 2017-03-12 NOTE — CARE PLAN
Problem: Communication  Goal: The ability to communicate needs accurately and effectively will improve  Pt's admit profile and med rec completed- pt takes lantus 20 units at home nightly, MD notified and orders received. Pt and family updated on pt's condition and plan of care, all questions answered     Problem: Safety  Goal: Will remain free from injury  Bed in low position, pt instructed to call prior to getting out of bed. Treaded slipper socks on pt. Stand-by assist. Pt on continuous integrelin for 12 hrs post-cath- proper fall precautions in place

## 2017-03-12 NOTE — DISCHARGE PLANNING
SW received notice that pt's family arrived and was in the er lobby. TIFFANIE met with pt's wife and escorted her to pt's room in [id number].

## 2017-03-12 NOTE — PROGRESS NOTES
"Med rec updated per patient.  Pt states hydroxyurea was missing from his list, \"I need to be taking that medication.\"  Allergies reviewed.  Pt on second 10 day course of Bactrim started 2 weeks ago for a hand injury.  "

## 2017-03-12 NOTE — PROGRESS NOTES
Patient arrived to [facility] with 2 Cath RN's via hospital bed. Patient hooked up to [facility] monitor. Patient shivering and given warm blankets. Patient urinated 50 cc into urinal standing next to bedside. Patient is AO x 4 and moves all extremities. Vital Signs are currently stable. Patients right hand and fingers with good perfusion. 3 cc removed from hem band.

## 2017-03-12 NOTE — CONSULTS
DATE OF SERVICE:  03/11/2017    REFERRING PHYSICIAN:  Guy Gansert, MD    REASON FOR REFERRAL:  Acute anterior wall myocardial infarction.    HISTORY OF PRESENT ILLNESS:  This is a pleasant 66-year-old white male with   polycythemia vera who has had recurrent MIs, previous history of stent in the   LAD and the circumflex.  Most recent MI at [facility] in the [place] area and had   angioplasty and aspiration of an occluded LAD with an acute anterior wall   myocardial infarction in March.  The patient came in with 2 hours of chest   pain today and had been doing well.  He is quite active.  He had been feeling   well up until now.  He had been in the process of moving here.  His past   medical history includes polycythemia vera and previous stenting.  Previous   myocardial infarction back in March.  He had atrial fibrillation back in   December and was seen by Dr. Dye, was cardioverted and placed on Eliquis,   continued on Brilinta.    ALLERGIES:  HE HAS ALLERGIES TO METFORMIN AND ALL STATINS.    PAST MEDICAL HISTORY:  Heart disease.    SOCIAL HISTORY:  Minimal smoking.  Minimal alcohol.  He is quite active, rides   his bike.    REVIEW OF SYSTEMS:  CONSTITUTIONAL:  No fever, chills or sweat.  PULMONARY:  No chronic cough or hemoptysis.  GASTROINTESTINAL:  No change in bowels.  No diarrhea or constipation.    PHYSICAL EXAMINATION:  GENERAL:  A white male in mild distress.  VITAL SIGNS:  Blood pressure is 105/70, pulse 70, respirations 16.  HEENT:  JVD 7 cm.  Carotids without bruits.  LUNGS:  Clear.  CARDIAC:  Sounds regular rhythm, normal S1, S2, without murmurs, rubs or   gallops.  ABDOMEN:  Soft, nontender without hepatosplenomegaly.  EXTREMITIES:  Without clubbing, cyanosis or edema.  NEUROLOGIC:  Nonfocal.  PSYCHIATRIC:  Alert and oriented x3.  Mood and affect appropriate.    DIAGNOSTIC DATA:  EKG shows sinus rhythm with Q-waves anteriorly with ST   elevation anteriorly in V1 through V3.    ASSESSMENT:  1.  Acute  anterior wall myocardial infarction.  Patient will be brought   emergently to cardiac catheterization lab.  We have gone over the old report   with Dr. Watkins and the patient is getting primary angioplasty hopefully.  2.  Hyperlipidemia.  He cannot tolerate statins.  3.  Polycythemia vera.  4.  Recent atrial fibrillation on Eliquis.  5.  Diabetes mellitus.  6.  Benign prostatic hyperplasia.       ____________________________________     CHRISTIANO JUAREZ MD    OSWALD / NTS    DD:  03/11/2017 16:26:53  DT:  03/11/2017 18:49:31    D#:  [id number]  Job#:  [id number]    cc: CHRISTIN GREENWOOD MD

## 2017-03-12 NOTE — PROGRESS NOTES
Cardiology Progress Note               Author: Neptali Watkins Date & Time created: 3/12/2017  8:41 AM     Interval History:  3/12: BP 82/49. Pulse 86. Sinus. No cardiac symptoms. Tolerating medications.    Review of Systems   Respiratory: Negative for cough and shortness of breath.    Cardiovascular: Negative for chest pain and PND.   Neurological: Negative for dizziness.       Physical Exam   Constitutional: He is oriented to person, place, and time. He appears well-nourished. No distress.   HENT:   Head: Normocephalic.   Eyes: EOM are normal. No scleral icterus.   Neck:   Normal jugular venous pressure.   Cardiovascular: Normal rate, regular rhythm, S1 normal, S2 normal and normal heart sounds.  Exam reveals no gallop and no friction rub.    No murmur heard.  Pulses:       Radial pulses are 2+ on the right side.   Pulmonary/Chest: Effort normal and breath sounds normal. He has no wheezes. He has no rhonchi. He has no rales.   Musculoskeletal: He exhibits no edema.   Neurological: He is alert and oriented to person, place, and time.   Skin: Skin is warm and dry.   Psychiatric: He has a normal mood and affect. His behavior is normal.       Hemodynamics:  Temp (24hrs), Av.1 °C (98.8 °F), Min:36.9 °C (98.4 °F), Max:37.4 °C (99.3 °F)  Temperature: 37 °C (98.6 °F)  Pulse  Av.1  Min: 75  Max: 103Heart Rate (Monitored): 86  NIBP: (!) 82/49 mmHg     Respiratory:    Respiration: 18, Pulse Oximetry: 98 %     Work Of Breathing / Effort: Mild  RUL Breath Sounds: Clear, RML Breath Sounds: Clear, RLL Breath Sounds: Diminished, EVELINA Breath Sounds: Clear, LLL Breath Sounds: Diminished  Fluids:  Date 17 0700 - 17 0659   Shift 6884-3086 7514-3716 8934-0662 24 Hour Total   I  N  T  A  K  E   Shift Total       O  U  T  P  U  T   Urine 225   225    Shift Total 225   225   Weight (kg) 98.5 98.5 98.5 98.5       Weight: 98.5 kg (217 lb 2.5 oz)  GI/Nutrition:  Orders Placed This Encounter   Procedures   • Diet  "Order     Standing Status: Standing      Number of Occurrences: 1      Standing Expiration Date:      Order Specific Question:  Diet:     Answer:  Cardiac [6]     Lab Results:  Recent Labs      03/11/17   1525  03/11/17   1602  03/12/17   0540   WBC  16.9*  16.8*  16.7*   RBC  6.59*  6.59*  5.69   HEMOGLOBIN  12.2*  12.3*  10.6*   HEMATOCRIT  42.1  41.7*  35.3*   MCV  63.9*  63.3*  62.0*   MCH  18.5*  18.7*  18.6*   MCHC  29.0*  29.5*  30.0*   RDW  43.0  42.5  39.8   PLATELETCT  1160*  1053*  965*   MPV  9.7  9.4  9.8     Recent Labs      03/11/17   1525  03/11/17   1602  03/12/17   0540   SODIUM  132*  133*  126*   POTASSIUM  3.6  4.1  4.0   CHLORIDE  97  100  100   CO2  18*  22  19*   GLUCOSE  255*  251*  195*   BUN  21  20  24*   CREATININE  1.75*  1.76*  1.63*   CALCIUM  9.3  9.8  8.6     Recent Labs      03/11/17   1525  03/11/17   1602   APTT  33.1  31.9   INR  1.64*  1.63*     Recent Labs      03/11/17   1525  03/11/17   1602   BNPBTYPENAT  150*  146*     Recent Labs      03/11/17   1525  03/11/17   1602  03/12/17   0540   TROPONINI  <0.02  0.02  29.50*   BNPBTYPENAT  150*  146*   --      Recent Labs      03/12/17   0540   TRIGLYCERIDE  55   HDL  22*   LDL  37     Rhythm: Normal sinus rhythm, rate 86. Reviewed by myself.    Medical Decision Making, by Problem:  Active Hospital Problems    Diagnosis   • STEMI (ST elevation myocardial infarction) (CMS-HCC) [I21.3]       Assessment and Plan:  STEMI. Anterior.  Low BP precludes beta blockers or ACE inhibitor at this time.    PCI: Thrombectomy. Proximal LAD stent. Mid LAD stent.  Dual antiplatelet therapy with Brilinta and aspirin.    PAF.  On Eliquis.    Anticoagulation. On Eliquis.    Hyperlipidemia.   Intolerant to \"all\" statins due to significant myositis.  On WelChol.  Needs PCSK 9 therapy.    Previous myocardial infarctions.    Previous multiple coronary stents.    Thrombocythemia. On hydroxyurea.    History of CVA. [place] to be embolic related to " PAF.        EKG reviewed, Medications reviewed, Labs reviewed and Radiology images reviewed

## 2017-03-12 NOTE — PROGRESS NOTES
Assumed pt care- levophed running at 7 w/ no active order in MAR. Dr. Neil contacted, orders received to keep levophed running and slowly taper off. Code status in computer also addressed w/ Dr. Neil- pt is full code

## 2017-03-12 NOTE — PROGRESS NOTES
Air removed from hemoband per protocol, hemoband removed. Pressure held on site and dressing placed- site clean, dry, intact

## 2017-03-12 NOTE — DISCHARGE PLANNING
Medical Social Work    Referral: STEMI    Intervention: SW responded to STEMI. Pt is Francesco Schulte AWA 54 who was transferred from [facility]. Per pt, his wife Alysa is driving to [place] this afternoon. Alysa's number is [phone]. SW LM for Alysa to notify her of pt's admission, as he is going to the cath lab.    Plan: SW to remain available for any further needs.

## 2017-03-12 NOTE — CARE PLAN
Problem: Nutritional:  Goal: Patient to verbalize or demonstrate understanding of diet  Outcome: NOT MET  Consult for cardiac diet ed received; patient sleeping heavily during time of visit. Will re-attempt diet ed when the patient is more alert. Handout left at bedside.

## 2017-03-12 NOTE — PROGRESS NOTES
Dr. Neil at bedside- orders to stop levophed and deacon BEST if pt has hypotension that is symptomatic

## 2017-03-12 NOTE — PROGRESS NOTES
Pt feeling more fatigued. Dr. Wagner aware of lethargy as well as BP and has been in to speak with pt. Orders given. Pt is able to get up to bathroom with standby assist. No complaints of dizziness upon standing.

## 2017-03-13 PROBLEM — E11.9 DM2 (DIABETES MELLITUS, TYPE 2) (HCC): Status: ACTIVE | Noted: 2017-03-13

## 2017-03-13 PROBLEM — D72.829 LEUKOCYTOSIS: Status: ACTIVE | Noted: 2017-03-13

## 2017-03-13 PROBLEM — E87.1 HYPONATREMIA: Status: ACTIVE | Noted: 2017-03-13

## 2017-03-13 PROBLEM — D68.9 COAGULOPATHY (HCC): Status: ACTIVE | Noted: 2017-03-13

## 2017-03-13 PROBLEM — I25.10 CAD (CORONARY ARTERY DISEASE): Status: ACTIVE | Noted: 2017-03-13

## 2017-03-13 PROBLEM — N17.9 ARF (ACUTE RENAL FAILURE) (HCC): Status: ACTIVE | Noted: 2017-03-13

## 2017-03-13 PROBLEM — I50.22 CHRONIC SYSTOLIC CHF (CONGESTIVE HEART FAILURE) (HCC): Status: ACTIVE | Noted: 2017-03-13

## 2017-03-13 LAB
ANION GAP SERPL CALC-SCNC: 6 MMOL/L (ref 0–11.9)
BASOPHILS # BLD AUTO: 1.1 % (ref 0–1.8)
BASOPHILS # BLD: 0.16 K/UL (ref 0–0.12)
BNP SERPL-MCNC: 100 PG/ML (ref 0–100)
BUN SERPL-MCNC: 18 MG/DL (ref 8–22)
CALCIUM SERPL-MCNC: 8.6 MG/DL (ref 8.5–10.5)
CHLORIDE SERPL-SCNC: 109 MMOL/L (ref 96–112)
CO2 SERPL-SCNC: 20 MMOL/L (ref 20–33)
CREAT SERPL-MCNC: 1.13 MG/DL (ref 0.5–1.4)
EOSINOPHIL # BLD AUTO: 0.44 K/UL (ref 0–0.51)
EOSINOPHIL NFR BLD: 3.1 % (ref 0–6.9)
ERYTHROCYTE [DISTWIDTH] IN BLOOD BY AUTOMATED COUNT: 42.4 FL (ref 35.9–50)
GFR SERPL CREATININE-BSD FRML MDRD: >60 ML/MIN/1.73 M 2
GLUCOSE BLD-MCNC: 154 MG/DL (ref 65–99)
GLUCOSE BLD-MCNC: 175 MG/DL (ref 65–99)
GLUCOSE BLD-MCNC: 177 MG/DL (ref 65–99)
GLUCOSE BLD-MCNC: 187 MG/DL (ref 65–99)
GLUCOSE BLD-MCNC: 191 MG/DL (ref 65–99)
GLUCOSE BLD-MCNC: 81 MG/DL (ref 65–99)
GLUCOSE SERPL-MCNC: 79 MG/DL (ref 65–99)
HCT VFR BLD AUTO: 37.1 % (ref 42–52)
HGB BLD-MCNC: 10.5 G/DL (ref 14–18)
IMM GRANULOCYTES # BLD AUTO: 0.09 K/UL (ref 0–0.11)
IMM GRANULOCYTES NFR BLD AUTO: 0.6 % (ref 0–0.9)
LYMPHOCYTES # BLD AUTO: 0.84 K/UL (ref 1–4.8)
LYMPHOCYTES NFR BLD: 6 % (ref 22–41)
MAGNESIUM SERPL-MCNC: 1.9 MG/DL (ref 1.5–2.5)
MCH RBC QN AUTO: 18.1 PG (ref 27–33)
MCHC RBC AUTO-ENTMCNC: 28.3 G/DL (ref 33.7–35.3)
MCV RBC AUTO: 63.9 FL (ref 81.4–97.8)
MONOCYTES # BLD AUTO: 0.69 K/UL (ref 0–0.85)
MONOCYTES NFR BLD AUTO: 4.9 % (ref 0–13.4)
NEUTROPHILS # BLD AUTO: 11.77 K/UL (ref 1.82–7.42)
NEUTROPHILS NFR BLD: 84.3 % (ref 44–72)
NRBC # BLD AUTO: 0.02 K/UL
NRBC BLD AUTO-RTO: 0.1 /100 WBC
PHOSPHATE SERPL-MCNC: 3.4 MG/DL (ref 2.5–4.5)
PLATELET # BLD AUTO: 914 K/UL (ref 164–446)
PMV BLD AUTO: 9.8 FL (ref 9–12.9)
POTASSIUM SERPL-SCNC: 3.7 MMOL/L (ref 3.6–5.5)
RBC # BLD AUTO: 5.81 M/UL (ref 4.7–6.1)
SODIUM SERPL-SCNC: 135 MMOL/L (ref 135–145)
VIT B12 SERPL-MCNC: 414 PG/ML (ref 211–911)
WBC # BLD AUTO: 14 K/UL (ref 4.8–10.8)

## 2017-03-13 PROCEDURE — 700111 HCHG RX REV CODE 636 W/ 250 OVERRIDE (IP): Performed by: INTERNAL MEDICINE

## 2017-03-13 PROCEDURE — A9270 NON-COVERED ITEM OR SERVICE: HCPCS | Performed by: INTERNAL MEDICINE

## 2017-03-13 PROCEDURE — 36415 COLL VENOUS BLD VENIPUNCTURE: CPT

## 2017-03-13 PROCEDURE — 99233 SBSQ HOSP IP/OBS HIGH 50: CPT | Performed by: FAMILY MEDICINE

## 2017-03-13 PROCEDURE — 83880 ASSAY OF NATRIURETIC PEPTIDE: CPT

## 2017-03-13 PROCEDURE — A9270 NON-COVERED ITEM OR SERVICE: HCPCS | Performed by: HOSPITALIST

## 2017-03-13 PROCEDURE — 700102 HCHG RX REV CODE 250 W/ 637 OVERRIDE(OP): Performed by: INTERNAL MEDICINE

## 2017-03-13 PROCEDURE — 84100 ASSAY OF PHOSPHORUS: CPT

## 2017-03-13 PROCEDURE — 83735 ASSAY OF MAGNESIUM: CPT

## 2017-03-13 PROCEDURE — 770020 HCHG ROOM/CARE - TELE (206)

## 2017-03-13 PROCEDURE — 700102 HCHG RX REV CODE 250 W/ 637 OVERRIDE(OP): Performed by: HOSPITALIST

## 2017-03-13 PROCEDURE — 82962 GLUCOSE BLOOD TEST: CPT

## 2017-03-13 PROCEDURE — 82607 VITAMIN B-12: CPT

## 2017-03-13 PROCEDURE — 85025 COMPLETE CBC W/AUTO DIFF WBC: CPT

## 2017-03-13 PROCEDURE — 80048 BASIC METABOLIC PNL TOTAL CA: CPT

## 2017-03-13 RX ORDER — ATORVASTATIN CALCIUM 40 MG/1
40 TABLET, FILM COATED ORAL
Status: DISCONTINUED | OUTPATIENT
Start: 2017-03-13 | End: 2017-03-14 | Stop reason: HOSPADM

## 2017-03-13 RX ORDER — METOPROLOL SUCCINATE 25 MG/1
25 TABLET, EXTENDED RELEASE ORAL
Status: DISCONTINUED | OUTPATIENT
Start: 2017-03-13 | End: 2017-03-14 | Stop reason: HOSPADM

## 2017-03-13 RX ADMIN — APIXABAN 5 MG: 5 TABLET, FILM COATED ORAL at 21:46

## 2017-03-13 RX ADMIN — COLESEVELAM HYDROCHLORIDE 625 MG: 625 TABLET, FILM COATED ORAL at 08:36

## 2017-03-13 RX ADMIN — Medication 325 MG: at 17:59

## 2017-03-13 RX ADMIN — METOPROLOL SUCCINATE 25 MG: 25 TABLET, EXTENDED RELEASE ORAL at 11:54

## 2017-03-13 RX ADMIN — TAMSULOSIN HYDROCHLORIDE 0.4 MG: 0.4 CAPSULE ORAL at 21:45

## 2017-03-13 RX ADMIN — FAMOTIDINE 40 MG: 20 TABLET, FILM COATED ORAL at 08:35

## 2017-03-13 RX ADMIN — FLUDROCORTISONE ACETATE 0.1 MG: 0.1 TABLET ORAL at 08:36

## 2017-03-13 RX ADMIN — TICAGRELOR 90 MG: 90 TABLET ORAL at 21:48

## 2017-03-13 RX ADMIN — HYDROXYUREA 1000 MG: 500 CAPSULE ORAL at 21:42

## 2017-03-13 RX ADMIN — INSULIN LISPRO 2 UNITS: 100 INJECTION, SOLUTION INTRAVENOUS; SUBCUTANEOUS at 17:53

## 2017-03-13 RX ADMIN — TAMSULOSIN HYDROCHLORIDE 0.4 MG: 0.4 CAPSULE ORAL at 08:35

## 2017-03-13 RX ADMIN — GLIPIZIDE 10 MG: 10 TABLET ORAL at 21:45

## 2017-03-13 RX ADMIN — TICAGRELOR 90 MG: 90 TABLET ORAL at 08:36

## 2017-03-13 RX ADMIN — Medication 325 MG: at 11:54

## 2017-03-13 RX ADMIN — MORPHINE SULFATE 2 MG: 4 INJECTION INTRAVENOUS at 17:50

## 2017-03-13 RX ADMIN — Medication 325 MG: at 08:35

## 2017-03-13 RX ADMIN — MORPHINE SULFATE 2 MG: 4 INJECTION INTRAVENOUS at 23:11

## 2017-03-13 RX ADMIN — FINASTERIDE 5 MG: 5 TABLET, FILM COATED ORAL at 08:35

## 2017-03-13 RX ADMIN — APIXABAN 5 MG: 5 TABLET, FILM COATED ORAL at 08:35

## 2017-03-13 RX ADMIN — ASPIRIN 81 MG: 81 TABLET, CHEWABLE ORAL at 08:34

## 2017-03-13 RX ADMIN — COLESEVELAM HYDROCHLORIDE 625 MG: 625 TABLET, FILM COATED ORAL at 17:59

## 2017-03-13 RX ADMIN — INSULIN GLARGINE 20 UNITS: 100 INJECTION, SOLUTION SUBCUTANEOUS at 21:43

## 2017-03-13 ASSESSMENT — ENCOUNTER SYMPTOMS
BLURRED VISION: 0
BRUISES/BLEEDS EASILY: 0
SHORTNESS OF BREATH: 0
HEMOPTYSIS: 0
PALPITATIONS: 0
FEVER: 0
DIARRHEA: 0
CHILLS: 0
NAUSEA: 0
SORE THROAT: 0
WEAKNESS: 1
ABDOMINAL PAIN: 0
TINGLING: 0
VOMITING: 0
HEADACHES: 0
DEPRESSION: 0
HEARTBURN: 0
MYALGIAS: 0
COUGH: 0
DIZZINESS: 0
WHEEZING: 0
DOUBLE VISION: 0
CARDIOVASCULAR NEGATIVE: 1

## 2017-03-13 ASSESSMENT — PAIN SCALES - GENERAL
PAINLEVEL_OUTOF10: 6
PAINLEVEL_OUTOF10: 0
PAINLEVEL_OUTOF10: 0
PAINLEVEL_OUTOF10: 5
PAINLEVEL_OUTOF10: 0
PAINLEVEL_OUTOF10: 3
PAINLEVEL_OUTOF10: 0
PAINLEVEL_OUTOF10: 0

## 2017-03-13 ASSESSMENT — CHA2DS2 SCORE
PRIOR STROKE OR TIA OR THROMBOEMBOLISM: NO
AGE 75 OR GREATER: NO
SEX: MALE
VASCULAR DISEASE: YES
DIABETES: YES
AGE 65 TO 74: NO
CHF OR LEFT VENTRICULAR DYSFUNCTION: YES
HYPERTENSION: NO
CHA2DS2 VASC SCORE: 3

## 2017-03-13 NOTE — CARE PLAN
Problem: Communication  Goal: The ability to communicate needs accurately and effectively will improve  Outcome: PROGRESSING AS EXPECTED  Patient educated and understands the use of the call light for any needs to be met or assistance with mobility.    Problem: Safety  Goal: Will remain free from falls  Outcome: PROGRESSING AS EXPECTED  Intervention: Implement fall precautions  Fall precautions in place, patient educated and understands the need for fall precautions.

## 2017-03-13 NOTE — PROGRESS NOTES
"Interval History:  66M with anterior STEMI 2/2 acute stent thrombosis s/p PCI 3/11, PV  3/13: No V events.    Physical Exam   Blood pressure 96/64, pulse 73, temperature 37.3 °C (99.2 °F), resp. rate 18, height 1.88 m (6' 2\"), weight 97.5 kg (214 lb 15.2 oz), SpO2 94 %.    Constitutional: Appears well-developed.   HENT: Normocephalic and atraumatic. No scleral icterus.   Neck: No JVD present.   Cardiovascular: Normal rate. Exam reveals no gallop and no friction rub. No murmur heard.   Pulmonary/Chest: CTAB   Abdominal: S/NT/ND BS+   Musculoskeletal: Exhibits no edema. Pulses present.  Skin: Skin is warm and dry.     ROS: As HPI other reviewed and negative       Intake/Output Summary (Last 24 hours) at 03/13/17 0948  Last data filed at 03/13/17 0833   Gross per 24 hour   Intake 2402.5 ml   Output   3450 ml   Net -1047.5 ml       Recent Labs      03/11/17   1602  03/12/17   0540  03/13/17   0822   WBC  16.8*  16.7*  14.0*   RBC  6.59*  5.69  5.81   HEMOGLOBIN  12.3*  10.6*  10.5*   HEMATOCRIT  41.7*  35.3*  37.1*   MCV  63.3*  62.0*  63.9*   MCH  18.7*  18.6*  18.1*   MCHC  29.5*  30.0*  28.3*   RDW  42.5  39.8  42.4   PLATELETCT  1053*  965*  914*   MPV  9.4  9.8  9.8     Recent Labs      03/11/17   1602  03/12/17   0540  03/13/17   0822   SODIUM  133*  126*  135   POTASSIUM  4.1  4.0  3.7   CHLORIDE  100  100  109   CO2  22  19*  20   GLUCOSE  251*  195*  79   BUN  20  24*  18   CREATININE  1.76*  1.63*  1.13   CALCIUM  9.8  8.6  8.6     Recent Labs      03/11/17   1525  03/11/17   1602   APTT  33.1  31.9   INR  1.64*  1.63*     Recent Labs      03/11/17   1525  03/11/17   1602   BNPBTYPENAT  150*  146*     Recent Labs      03/11/17   1525  03/11/17   1602  03/12/17   0540  03/12/17   1300   TROPONINI  <0.02  0.02  29.50*  23.91*   BNPBTYPENAT  150*  146*   --    --      Recent Labs      03/12/17   0540   TRIGLYCERIDE  55   HDL  22*   LDL  37       No current facility-administered medications on file prior to " encounter.     Current Outpatient Prescriptions on File Prior to Encounter   Medication Sig Dispense Refill   • diphenhydrAMINE (BENADRYL) 50 MG Cap Take 50 mg by mouth every 6 hours as needed.     • mupirocin calcium (BACTROBAN) 2 % Cream Apply twice a day and/ or after each washing to affected area. 1 Tube 0   • sulfamethoxazole-trimethoprim (BACTRIM DS) 800-160 MG tablet Take 1 Tab by mouth every 12 hours. Stay hydrated 20 Tab 0   • metoprolol SR (TOPROL XL) 25 MG TABLET SR 24 HR Take 1 Tab by mouth every day. 30 Tab 2   • apixaban (ELIQUIS) 5mg Tab Take 1 Tab by mouth 2 Times a Day. 60 Tab 1   • ticagrelor (BRILINTA) 90 MG Tab tablet Take 90 mg by mouth 2 Times a Day.     • tamsulosin (FLOMAX) 0.4 MG capsule Take 0.4 mg by mouth 2 Times a Day.     • finasteride (PROSCAR) 5 MG Tab Take 5 mg by mouth every day.     • colesevelam (WELCHOL) 625 MG Tab Take 625 mg by mouth 2 times a day, with meals.     • glipiZIDE (GLUCOTROL) 10 MG Tab Take 10 mg by mouth every evening.     • tadalafil (CIALIS) 5 MG tablet Take 5 mg by mouth every day.         Current Facility-Administered Medications   Medication Dose Frequency Provider Last Rate Last Dose   • fludrocortisone (FLORINEF) tablet 0.1 mg  0.1 mg QAM Billy Wagner M.D.   0.1 mg at 03/13/17 0836   • apixaban (ELIQUIS) 2.5mg tablet 5 mg  5 mg BID Billy Wagner M.D.   5 mg at 03/13/17 0835   • colesevelam (WELCHOL) tablet 625 mg  625 mg BID WITH MEALS Billy Wagner M.D.   625 mg at 03/13/17 0836   • finasteride (PROSCAR) tablet 5 mg  5 mg DAILY Billy Wagner M.D.   5 mg at 03/13/17 0835   • glipiZIDE (GLUCOTROL) tablet 10 mg  10 mg Q EVENING Billy Wagner M.D.   10 mg at 03/12/17 2215   • insulin glargine (LANTUS) injection 20 Units  20 Units Nightly Billy Wagner M.D.   20 Units at 03/12/17 2222   • tamsulosin (FLOMAX) capsule 0.4 mg  0.4 mg BID Billy Wagner M.D.   0.4 mg at 03/13/17 0835   • NS (BOLUS) infusion 500 mL  500 mL  Once Billy Wagner M.D.       • senna-docusate (PERICOLACE or SENOKOT S) 8.6-50 MG per tablet 1 Tab  1 Tab QDAY PRN Billy Wagner M.D.   1 Tab at 03/12/17 1514   • hydroxyurea (HYDREA) capsule 1,000 mg  1,000 mg DAILY Becka Flores M.D.       • ferrous sulfate tablet 325 mg  325 mg TID WITH MEALS Becka Flores M.D.   325 mg at 03/13/17 0835   • morphine (pf) 4 mg/ml injection 2 mg  2 mg Q4HRS PRN Darci Patten M.D.   2 mg at 03/12/17 2210   • NS infusion   Continuous Neptali Watkins M.D. 75 mL/hr at 03/12/17 2330     • ticagrelor (BRILINTA) tablet 90 mg  90 mg BID Neptali Watkins M.D.   90 mg at 03/13/17 0836   • aspirin (ASA) chewable tab 81 mg  81 mg DAILY Neptali Watkins M.D.   81 mg at 03/13/17 0834   • famotidine (PEPCID) tablet 40 mg  40 mg DAILY Darci Patten M.D.   40 mg at 03/13/17 0835   • insulin lispro (HUMALOG) injection 2-9 Units  2-9 Units Q6HRS Darci Patten M.D.   Stopped at 03/13/17 0600   • glucose 4 g chewable tablet 16 g  16 g Q15 MIN PRN Darci Patten M.D.        And   • dextrose 50% (D50W) injection 25 mL  25 mL Q15 MIN PRN Darci Patten M.D.         Last reviewed on 3/12/2017 12:22 PM by Malissa Garcia, Pharmacy Int  Medications reviewed    Imaging reviewed    ECHO(3/12):  Mild reduced left ventricular systolic function.  Left ventricular ejection fraction is visually estimated to be 45%.  Septal wall akinesis.  Right heart pressures are normal.  Aortic sclerosis without stenosis.  Mild aortic insufficiency.    Impressions:  1. Anterior STEMI s/p PCI 3/11  2. Mild ischemic cardiomyopathy. LVEF 45%  3. Polycythemia vera  4. Reactive thrombocytosis/MPD  5. Iron deficiency 2/2 thrombophlebotomies     Recommendations:  Continue apixiban, aspirin and Brilinta  Begin Toprol XL 25mg daily  Holding ACEI 2/2 borderline hemodynamics  Start atorvastatin 40mg   Rx thrombocytosis and PV per hematology    OK to D/C from CV standpoint if  tolerating BB.    Thank you for the consultation. Will sign off. Please call with any questions.

## 2017-03-13 NOTE — CONSULTS
[id number]    1.  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  Diagnosed 2008 in setting of acute MI with Hgb ~56, unknown platelet count. No bone marrow done.  Records not available but per patient it has been managed since that time primarily with phlebotomy and low dose hydrea (500 mg daily).  He reports a rash on upper body that may have been correlated with hydrea use in a dose-dependent fashion.  With his polycythemia and thrombocythemia, hydrea would be the best option for him because it would reduce both cell lines and I discussed with him and wife another trial of Hydrea.  They are amenable and we will start with 1000 mg PO daily.      2.  Thrombocythemia.  Due to underlying MPD plus reactive component due to iron deficiency.  Will increase hydrea, as above, and also give 9 doses of oral iron to reduce the reactive stimulus.  Iron can be stopped if it seems Hct rising too fast (currently 35).     3.  Acute MI following in-stent thrombosis.  Status post thrombectomy.  Underlying MPD certainly could contribute and patient reports strong correlation of acute coronary events with platelets over 1000.     4.  Acute kidney injury.  Likely due to MI, improving slightly.      5.  Possible prior drug rash to hydrea, reports rash is dose-dependent.  Will monitor closely as we increase dose.    We will follow with you.

## 2017-03-13 NOTE — PROGRESS NOTES
Assumed care at 0700. Bedside report received from Casie. Patient's chart and MAR reviewed. Pt is A & O x4. Patient was updated on plan of care for the day. Questions answered and concerns addressed.  Pt denies any additional needs at this time. White board updated. Call light, phone and personal belongings within reach. Pt does state a return of the feeling in his right hand. Pt c/o pain/soreness to his LASHAWN. Area is noted to be be bruised and swollen (underneath aspect of upper arm). Will continue to monitor.

## 2017-03-13 NOTE — PROGRESS NOTES
Patient arrived via wheelchair with RN from [facility].  Patient is on room air, IV saline locked.  Fluids have been started per MAR.  2 RN skin check performed.  Right wrist radial site clean and intact, dressing in place.  No reports of pain at this time.  Continuous pulse ox per orders.  Patient is refusing the SCDs at this time, he stated that he may want them on tomorrow.  Educated patient.  Patient is wearing treaded socks, bed alarm in place and working, patient educated on the use of the call light and to have assistance with any mobility needs.  Chemo cart in place as patient is on a chemo medication.  Got water and warm blanket for patient, checked blood sugar and medicated per MAR.  NO other needs expressed at this time.  Will continue to monitor with hourly rounding in place.

## 2017-03-13 NOTE — PROGRESS NOTES
"Pt up ad sabina and is noted to be steady on his feet. Pt assisted to prepare for a shower. Pt states he feels \"alot better\" since he has been up walking ad sabina. Pt denies any dizziness.   "

## 2017-03-13 NOTE — PROGRESS NOTES
Patient ambulated to restroom.  No reports of pain at this time or needs expressed.  Patient back in bed, safety precautions in place.  Will continue to monitor.

## 2017-03-13 NOTE — CARE PLAN
Problem: Communication  Goal: The ability to communicate needs accurately and effectively will improve  Outcome: PROGRESSING AS EXPECTED    Problem: Safety  Goal: Will remain free from injury  Outcome: PROGRESSING AS EXPECTED  Pt has been cleared to walk ad sabina. Pt states he feels a lot better since he is out of bed and denies any dizziness.

## 2017-03-13 NOTE — PROGRESS NOTES
Hospital Medicine Progress Note, Adult, Complex               Author: Guillermo Ramirez Date & Time created: 3/13/2017  12:14 PM     Interval History:  Admitted for STEMI, CAD, Thrombocytosis, Renal failure, history of Polycythemia.    STEMI - no chest pain currently  CAD - stents placed to LAD  Renal failure - crea better  PV - hgb stable  Thrombocytosis - plt ct remains high, history of rash when hydrea dose increased  Diabetes - not controlled    Review of Systems:  Review of Systems   Constitutional: Positive for malaise/fatigue. Negative for fever and chills.   HENT: Negative for sore throat.    Eyes: Negative for blurred vision.   Respiratory: Negative for cough, shortness of breath and wheezing.    Cardiovascular: Negative for chest pain and leg swelling.   Gastrointestinal: Negative for heartburn, nausea, vomiting, abdominal pain and diarrhea.   Genitourinary: Negative for dysuria.   Neurological: Positive for weakness. Negative for dizziness and headaches.       Physical Exam:  Physical Exam   Constitutional: He is oriented to person, place, and time. He appears well-developed and well-nourished.   HENT:   Head: Normocephalic and atraumatic.   Eyes: Conjunctivae are normal. Pupils are equal, round, and reactive to light.   Neck: No tracheal deviation present. No thyromegaly present.   Cardiovascular: Normal rate and regular rhythm.    Pulmonary/Chest: Effort normal and breath sounds normal.   Abdominal: Soft. Bowel sounds are normal. He exhibits no distension. There is no tenderness.   Lymphadenopathy:     He has no cervical adenopathy.   Neurological: He is alert and oriented to person, place, and time.   Skin: No rash noted. No erythema.   Nursing note and vitals reviewed.      Labs:        Invalid input(s): [id number]  Recent Labs      03/11/17   1525  03/11/17   1602  03/12/17   0540  03/12/17   1300  03/13/17   0822   TROPONINI  <0.02  0.02  29.50*  23.91*   --    BNPBTYPENAT  150*  146*   --     --   100     Recent Labs      17   1602  17   0540  17   0822   SODIUM  133*  126*  135   POTASSIUM  4.1  4.0  3.7   CHLORIDE  100  100  109   CO2  22  19*  20   BUN  20  24*  18   CREATININE  1.76*  1.63*  1.13   MAGNESIUM   --    --   1.9   PHOSPHORUS   --    --   3.4   CALCIUM  9.8  8.6  8.6     Recent Labs      17   1525  17   1602  17   0540  17   0822   ALTSGPT  38  34   --    --    ASTSGOT  36  29   --    --    ALKPHOSPHAT  73  67   --    --    TBILIRUBIN  1.5  1.2   --    --    LIPASE   --   29   --    --    GLUCOSE  255*  251*  195*  79     Recent Labs      17   1525  17   1602  17   0540  17   0822   RBC  6.59*  6.59*  5.69  5.81   HEMOGLOBIN  12.2*  12.3*  10.6*  10.5*   HEMATOCRIT  42.1  41.7*  35.3*  37.1*   PLATELETCT  1160*  1053*  965*  914*   PROTHROMBTM  19.2*  19.8*   --    --    APTT  33.1  31.9   --    --    INR  1.64*  1.63*   --    --      Recent Labs      17   1525  17   1602  17   0540  17   0822   WBC  16.9*  16.8*  16.7*  14.0*   NEUTSPOLYS  95.40*  97.40*  94.80*  84.30*   LYMPHOCYTES  1.40*  0.90*  0.90*  6.00*   MONOCYTES  1.90  0.00  0.00  4.90   EOSINOPHILS  0.30  0.00  0.00  3.10   BASOPHILS  0.20  0.00  0.80  1.10   ASTSGOT  36  29   --    --    ALTSGPT  38  34   --    --    ALKPHOSPHAT  73  67   --    --    TBILIRUBIN  1.5  1.2   --    --            Hemodynamics:  Temp (24hrs), Av °C (98.6 °F), Min:36.7 °C (98 °F), Max:37.3 °C (99.2 °F)  Temperature: 36.7 °C (98 °F)  Pulse  Av.7  Min: 68  Max: 103Heart Rate (Monitored): 77  Blood Pressure: 122/69 mmHg, NIBP: 101/61 mmHg     Respiratory:    Respiration: 17, Pulse Oximetry: 94 %     Work Of Breathing / Effort: Mild  RUL Breath Sounds: Clear, RML Breath Sounds: Clear, RLL Breath Sounds: Diminished, EVELINA Breath Sounds: Clear, LLL Breath Sounds: Diminished  Fluids:    Intake/Output Summary (Last 24 hours) at 17 1214  Last data filed  at 03/13/17 0833   Gross per 24 hour   Intake 2027.5 ml   Output   2950 ml   Net -922.5 ml     Weight: 97.5 kg (214 lb 15.2 oz)  GI/Nutrition:  Orders Placed This Encounter   Procedures   • Diet Order     Standing Status: Standing      Number of Occurrences: 1      Standing Expiration Date:      Order Specific Question:  Diet:     Answer:  Cardiac [6]     Order Specific Question:  Diet:     Answer:  Diabetic [3]     Medical Decision Making, by Problem:  Active Hospital Problems    Diagnosis   • *STEMI (ST elevation myocardial infarction) (CMS-[facility]) [I21.3]      ASA, Brilinta   • ARF (acute renal failure) (CMS-[facility]) [N17.9]      stop IVF, follow bmp   • CAD (coronary artery disease) [I25.10]      stents placed to LAD, ASA, Brilinta   • Thrombocytosis (CMS-[facility]) [D47.3]     Hydrea dose increased, Hematology following   • Leukocytosis [D72.829]     Follow cbc   • Hyponatremia [E87.1]      Stop IVF, follow bmp   • Coagulopathy (CMS-[facility]) [D68.9]     Follow INR    • Chronic systolic CHF (congestive heart failure) (CMS-[facility]) [I50.22]     Metoprolol, wait on ACEI due to recent ARF   • DM2 (diabetes mellitus, type 2) (CMS-[facility]) [E11.9]     Glipizide resumed yesterday, continue Lantus, SSI, hold Metformin due to ARF   • BPH (benign prostatic hyperplasia) [N40.0]      Proscar, Flomax   • Paroxysmal atrial fibrillation (CMS-HCC) [I48.0]      Metoprolol, Eliquis   • Polycythemia [D75.1]     Follow cbc   • Dyslipidemia [E78.5]        Welchol, intolerant of statins     DVT Prophylaxis.   Eliquis  Medications reviewed, EKG reviewed, Labs reviewed and Radiology images reviewed  Andres catheter: No Andres          Ulcer prophylaxis: Yes

## 2017-03-13 NOTE — THERAPY
Physical Therapy Contact Note     PT cardiac rehab attempted, pt requesting shower upon attempt; will follow back when able to educate on phase I cardiac rehab;     Jessica Mack, PT, DPT Pager: [phone]

## 2017-03-13 NOTE — PROGRESS NOTES
Oncology/Hematology Progress Note               Author: Suzan Sims Date & Time created: 3/13/2017  2:51 PM   CC:  MPD  Interval History:  He is doing better since admit.  NO issues overnight.  He is tolerating his hydrea so far.      Review of Systems:  Review of Systems   Constitutional: Positive for malaise/fatigue. Negative for fever and chills.   HENT: Negative.    Eyes: Negative for blurred vision and double vision.   Respiratory: Negative for cough and hemoptysis.    Cardiovascular: Negative.  Negative for chest pain and palpitations.   Gastrointestinal: Negative for heartburn, nausea and vomiting.   Genitourinary: Negative for dysuria and urgency.   Musculoskeletal: Negative for myalgias.   Skin: Negative for rash.   Neurological: Negative for dizziness and tingling.   Endo/Heme/Allergies: Does not bruise/bleed easily.   Psychiatric/Behavioral: Negative for depression.       Physical Exam:  Physical Exam   Constitutional: He is oriented to person, place, and time. He appears well-developed.   HENT:   Head: Normocephalic.   Eyes: Pupils are equal, round, and reactive to light.   Cardiovascular: Normal rate.    Pulmonary/Chest: Effort normal and breath sounds normal.   Abdominal: Soft. Bowel sounds are normal. There is no tenderness. There is no rebound.   Neurological: He is alert and oriented to person, place, and time.   Skin: No rash noted. No erythema.   Psychiatric: He has a normal mood and affect. His behavior is normal. Judgment and thought content normal.       Labs:        Invalid input(s): [id number]  Recent Labs      03/11/17   1525  03/11/17   1602  03/12/17   0540  03/12/17   1300  03/13/17   0822   TROPONINI  <0.02  0.02  29.50*  23.91*   --    BNPBTYPENAT  150*  146*   --    --   100     Recent Labs      03/11/17   1602  03/12/17   0540  03/13/17   0822   SODIUM  133*  126*  135   POTASSIUM  4.1  4.0  3.7   CHLORIDE  100  100  109   CO2  22  19*  20   BUN  20  24*  18   CREATININE   1.76*  1.63*  1.13   MAGNESIUM   --    --   1.9   PHOSPHORUS   --    --   3.4   CALCIUM  9.8  8.6  8.6     Recent Labs      17   15217   16017   0540  17   0822   ALTSGPT  38  34   --    --    ASTSGOT  36  29   --    --    ALKPHOSPHAT  73  67   --    --    TBILIRUBIN  1.5  1.2   --    --    LIPASE   --   29   --    --    GLUCOSE  255*  251*  195*  79     Recent Labs      17   1525  17   16017   0540  17   08   RBC  6.59*  6.59*  5.69  5.81   HEMOGLOBIN  12.2*  12.3*  10.6*  10.5*   HEMATOCRIT  42.1  41.7*  35.3*  37.1*   PLATELETCT  1160*  1053*  965*  914*   PROTHROMBTM  19.2*  19.8*   --    --    APTT  33.1  31.9   --    --    INR  1.64*  1.63*   --    --      Recent Labs      17   15217   16017   0540  17   0822   WBC  16.9*  16.8*  16.7*  14.0*   NEUTSPOLYS  95.40*  97.40*  94.80*  84.30*   LYMPHOCYTES  1.40*  0.90*  0.90*  6.00*   MONOCYTES  1.90  0.00  0.00  4.90   EOSINOPHILS  0.30  0.00  0.00  3.10   BASOPHILS  0.20  0.00  0.80  1.10   ASTSGOT  36  29   --    --    ALTSGPT  38  34   --    --    ALKPHOSPHAT  73  67   --    --    TBILIRUBIN  1.5  1.2   --    --      Recent Labs      17   16017   0540  17   08   SODIUM  133*  126*  135   POTASSIUM  4.1  4.0  3.7   CHLORIDE  100  100  109   CO2  22  19*  20   GLUCOSE  251*  195*  79   BUN  20  24*  18   CREATININE  1.76*  1.63*  1.13   CALCIUM  9.8  8.6  8.6     Hemodynamics:  Temp (24hrs), Av.9 °C (98.5 °F), Min:36.6 °C (97.8 °F), Max:37.3 °C (99.2 °F)  Temperature: 36.6 °C (97.8 °F)  Pulse  Av.3  Min: 68  Max: 103Heart Rate (Monitored): 77  Blood Pressure: 125/75 mmHg, NIBP: 101/61 mmHg     Respiratory:    Respiration: 16, Pulse Oximetry: 98 %     Work Of Breathing / Effort: Mild  RUL Breath Sounds: Clear, RML Breath Sounds: Clear, RLL Breath Sounds: Diminished, EVELINA Breath Sounds: Clear, LLL Breath Sounds:  Diminished  Fluids:    Intake/Output Summary (Last 24 hours) at 03/13/17 1451  Last data filed at 03/13/17 1200   Gross per 24 hour   Intake 1677.5 ml   Output   2675 ml   Net -997.5 ml     Weight: 97.5 kg (214 lb 15.2 oz)  GI/Nutrition:  Orders Placed This Encounter   Procedures   • Diet Order     Standing Status: Standing      Number of Occurrences: 1      Standing Expiration Date:      Order Specific Question:  Diet:     Answer:  Cardiac [6]     Order Specific Question:  Diet:     Answer:  Diabetic [3]     Medical Decision Making, by Problem:  Active Hospital Problems    Diagnosis   • *STEMI (ST elevation myocardial infarction) (CMS-HCC) [I21.3]   • ARF (acute renal failure) (CMS-HCC) [N17.9]   • CAD (coronary artery disease) [I25.10]   • Thrombocytosis (CMS-HCC) [D47.3]   • Leukocytosis [D72.829]   • Hyponatremia [E87.1]   • Coagulopathy (CMS-HCC) [D68.9]   • Chronic systolic CHF (congestive heart failure) (CMS-HCC) [I50.22]   • DM2 (diabetes mellitus, type 2) (CMS-HCC) [E11.9]   • BPH (benign prostatic hyperplasia) [N40.0]   • Paroxysmal atrial fibrillation (CMS-HCC) [I48.0]   • Polycythemia [D75.1]   • Dyslipidemia [E78.5]     PMHX:  He tell me that he was exposed to agent orange for 2 yrs when he was younger.      Plan:  1.  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  Diagnosed 2008 in setting of acute MI with Hgb ~56, unknown platelet count. No bone marrow done.  Records not available but per patient it has been managed since that time primarily with phlebotomy and low dose hydrea (500 mg daily).  He reports a rash on upper body that may have been correlated with hydrea use in a dose-dependent fashion.  With his polycythemia and thrombocythemia, hydrea would be the best option for him because it would reduce both cell lines and I discussed with him and wife another trial of Hydrea.  They are amenable and we will start with 1000 mg PO daily.      Will need to watch for drug rash.  I would keep him in house.  His h/h  ok.  Goal to keep <45.  We did discuss other options as well.  They also mention to me of some treatment in [place] that they are looking into as well.  We discussed that it may be interferon.  We discussed that I agree for a bone marrow bx at some point.      2.  Thrombocythemia.  Due to underlying MPD and Dr Flores felt that he may have a reactive component due to iron deficiency.  She increased hydrea to 1000 mg daily.  He is getting a few doses of iron to decrease any reactive component.  We will need to watch closely (hct 35).     I would keep until platelets are trending down (<750) with him tolerating hydrea.  Patients with platelets >1 million can be at risk for an aquired VWD.    3.  Acute MI following in-stent thrombosis.  Status post thrombectomy.  Underlying MPD certainly could contribute and patient reports strong correlation of acute coronary events with platelets over 1 million.    4.  Acute kidney injury.  His kidney function is improving.  Hydrea cleared by the kidney.      High complexity/ Discussed with patient, family, primary team.  >35 min >50% face to face    Labs reviewed, Radiology images reviewed and Medications reviewed
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
## 2017-03-13 NOTE — PROGRESS NOTES
Patient transported to T7 via wheelchair on tele box by ICU RN. Accepting RN at bedside, chart and medications, and personal belongings transferred. Family at bedside. No adverse events during transport.

## 2017-03-13 NOTE — PROGRESS NOTES
2 RN skin check performed with Chapis/JOSLYN.  Bruising on right upper arm near elbow from procedure, bottom is pink, but blanching.  All skin intact.

## 2017-03-13 NOTE — CARE PLAN
Problem: Nutritional:  Goal: Patient to verbalize or demonstrate understanding of diet  Outcome: MET Date Met:  03/13/17  Pt refused cardiac diet education and handout. Pt states had diet ed before.

## 2017-03-13 NOTE — CARE PLAN
Problem: Safety  Goal: Will remain free from injury  Intervention: Provide assistance with mobility  Pt able to ambulate safely to the bathroom with standby assist however, RN explained to pt that further ambulation is not advised due to orthostatic hypotension. Pt agrees and stated understanding. This RN assessed BP while standing multiple times throughout the day in an effort to further ambulate pt.       Problem: Knowledge Deficit  Goal: Knowledge of disease process/condition, treatment plan, diagnostic tests, and medications will improve  Intervention: Assess knowledge level of disease process/condition, treatment plan, diagnostic tests, and medications  Discussed with pt and care team the plan of care for the day. Pt understands plan however, is 'nervous about the blood pressure.' RN assured pt that BP would be closely monitored and dealt with throughout stay.

## 2017-03-14 VITALS
SYSTOLIC BLOOD PRESSURE: 104 MMHG | HEART RATE: 86 BPM | RESPIRATION RATE: 16 BRPM | DIASTOLIC BLOOD PRESSURE: 73 MMHG | HEIGHT: 74 IN | BODY MASS INDEX: 27.53 KG/M2 | TEMPERATURE: 98.2 F | OXYGEN SATURATION: 95 % | WEIGHT: 214.51 LBS

## 2017-03-14 LAB
ANION GAP SERPL CALC-SCNC: 11 MMOL/L (ref 0–11.9)
BASOPHILS # BLD AUTO: 2.2 % (ref 0–1.8)
BASOPHILS # BLD: 0.28 K/UL (ref 0–0.12)
BNP SERPL-MCNC: 131 PG/ML (ref 0–100)
BUN SERPL-MCNC: 16 MG/DL (ref 8–22)
CALCIUM SERPL-MCNC: 8.7 MG/DL (ref 8.5–10.5)
CHLORIDE SERPL-SCNC: 108 MMOL/L (ref 96–112)
CO2 SERPL-SCNC: 20 MMOL/L (ref 20–33)
CREAT SERPL-MCNC: 1.01 MG/DL (ref 0.5–1.4)
EOSINOPHIL # BLD AUTO: 0.54 K/UL (ref 0–0.51)
EOSINOPHIL NFR BLD: 4.3 % (ref 0–6.9)
ERYTHROCYTE [DISTWIDTH] IN BLOOD BY AUTOMATED COUNT: 43.5 FL (ref 35.9–50)
GFR SERPL CREATININE-BSD FRML MDRD: >60 ML/MIN/1.73 M 2
GLUCOSE BLD-MCNC: 242 MG/DL (ref 65–99)
GLUCOSE BLD-MCNC: 96 MG/DL (ref 65–99)
GLUCOSE SERPL-MCNC: 91 MG/DL (ref 65–99)
HAV IGM SERPL QL IA: NEGATIVE
HBV CORE IGM SER QL: NEGATIVE
HBV SURFACE AG SER QL: NEGATIVE
HCT VFR BLD AUTO: 36.2 % (ref 42–52)
HCV AB SER QL: NEGATIVE
HGB BLD-MCNC: 10.6 G/DL (ref 14–18)
IMM GRANULOCYTES # BLD AUTO: 0.05 K/UL (ref 0–0.11)
IMM GRANULOCYTES NFR BLD AUTO: 0.4 % (ref 0–0.9)
LYMPHOCYTES # BLD AUTO: 1.36 K/UL (ref 1–4.8)
LYMPHOCYTES NFR BLD: 10.7 % (ref 22–41)
MCH RBC QN AUTO: 18.6 PG (ref 27–33)
MCHC RBC AUTO-ENTMCNC: 29.3 G/DL (ref 33.7–35.3)
MCV RBC AUTO: 63.6 FL (ref 81.4–97.8)
MONOCYTES # BLD AUTO: 0.68 K/UL (ref 0–0.85)
MONOCYTES NFR BLD AUTO: 5.4 % (ref 0–13.4)
NEUTROPHILS # BLD AUTO: 9.75 K/UL (ref 1.82–7.42)
NEUTROPHILS NFR BLD: 77 % (ref 44–72)
NRBC # BLD AUTO: 0 K/UL
NRBC BLD AUTO-RTO: 0 /100 WBC
PLATELET # BLD AUTO: 912 K/UL (ref 164–446)
PMV BLD AUTO: 9.4 FL (ref 9–12.9)
POTASSIUM SERPL-SCNC: 3.8 MMOL/L (ref 3.6–5.5)
RBC # BLD AUTO: 5.69 M/UL (ref 4.7–6.1)
SODIUM SERPL-SCNC: 139 MMOL/L (ref 135–145)
WBC # BLD AUTO: 12.7 K/UL (ref 4.8–10.8)

## 2017-03-14 PROCEDURE — 700102 HCHG RX REV CODE 250 W/ 637 OVERRIDE(OP): Performed by: INTERNAL MEDICINE

## 2017-03-14 PROCEDURE — A9270 NON-COVERED ITEM OR SERVICE: HCPCS | Performed by: INTERNAL MEDICINE

## 2017-03-14 PROCEDURE — 82962 GLUCOSE BLOOD TEST: CPT

## 2017-03-14 PROCEDURE — 85025 COMPLETE CBC W/AUTO DIFF WBC: CPT

## 2017-03-14 PROCEDURE — 97535 SELF CARE MNGMENT TRAINING: CPT

## 2017-03-14 PROCEDURE — 80074 ACUTE HEPATITIS PANEL: CPT

## 2017-03-14 PROCEDURE — A9270 NON-COVERED ITEM OR SERVICE: HCPCS | Performed by: HOSPITALIST

## 2017-03-14 PROCEDURE — 700102 HCHG RX REV CODE 250 W/ 637 OVERRIDE(OP): Performed by: HOSPITALIST

## 2017-03-14 PROCEDURE — 83880 ASSAY OF NATRIURETIC PEPTIDE: CPT

## 2017-03-14 PROCEDURE — 99239 HOSP IP/OBS DSCHRG MGMT >30: CPT | Performed by: HOSPITALIST

## 2017-03-14 PROCEDURE — 80048 BASIC METABOLIC PNL TOTAL CA: CPT

## 2017-03-14 PROCEDURE — 36415 COLL VENOUS BLD VENIPUNCTURE: CPT

## 2017-03-14 RX ORDER — ASPIRIN 81 MG/1
81 TABLET, CHEWABLE ORAL DAILY
Qty: 100 TAB | Refills: 0 | Status: SHIPPED | OUTPATIENT
Start: 2017-03-14 | End: 2017-08-21

## 2017-03-14 RX ORDER — HYDROXYUREA 500 MG/1
1500 CAPSULE ORAL DAILY
Qty: 30 CAP | Refills: 0 | Status: ON HOLD | OUTPATIENT
Start: 2017-03-14 | End: 2017-03-21

## 2017-03-14 RX ORDER — FLUDROCORTISONE ACETATE 0.1 MG/1
0.1 TABLET ORAL EVERY MORNING
Qty: 30 TAB | Refills: 0 | Status: ON HOLD | OUTPATIENT
Start: 2017-03-14 | End: 2017-03-21

## 2017-03-14 RX ORDER — ATORVASTATIN CALCIUM 40 MG/1
40 TABLET, FILM COATED ORAL
Qty: 30 TAB | Refills: 0 | Status: SHIPPED | OUTPATIENT
Start: 2017-03-14 | End: 2017-03-16

## 2017-03-14 RX ADMIN — STANDARDIZED SENNA CONCENTRATE AND DOCUSATE SODIUM 1 TABLET: 8.6; 5 TABLET, FILM COATED ORAL at 12:51

## 2017-03-14 RX ADMIN — COLESEVELAM HYDROCHLORIDE 625 MG: 625 TABLET, FILM COATED ORAL at 07:30

## 2017-03-14 RX ADMIN — TICAGRELOR 90 MG: 90 TABLET ORAL at 09:48

## 2017-03-14 RX ADMIN — METOPROLOL SUCCINATE 25 MG: 25 TABLET, EXTENDED RELEASE ORAL at 09:48

## 2017-03-14 RX ADMIN — Medication 325 MG: at 12:51

## 2017-03-14 RX ADMIN — APIXABAN 5 MG: 5 TABLET, FILM COATED ORAL at 09:47

## 2017-03-14 RX ADMIN — TAMSULOSIN HYDROCHLORIDE 0.4 MG: 0.4 CAPSULE ORAL at 09:00

## 2017-03-14 RX ADMIN — FLUDROCORTISONE ACETATE 0.1 MG: 0.1 TABLET ORAL at 09:48

## 2017-03-14 RX ADMIN — FINASTERIDE 5 MG: 5 TABLET, FILM COATED ORAL at 09:47

## 2017-03-14 RX ADMIN — ASPIRIN 81 MG: 81 TABLET, CHEWABLE ORAL at 09:48

## 2017-03-14 RX ADMIN — Medication 325 MG: at 09:48

## 2017-03-14 ASSESSMENT — ENCOUNTER SYMPTOMS
HEARTBURN: 0
NAUSEA: 0
VOMITING: 0
FEVER: 0
CHILLS: 0
HEMOPTYSIS: 0
TINGLING: 0
DOUBLE VISION: 0
CARDIOVASCULAR NEGATIVE: 1
PALPITATIONS: 0
DIZZINESS: 0
BLURRED VISION: 0
MYALGIAS: 0
COUGH: 0
BRUISES/BLEEDS EASILY: 0
NERVOUS/ANXIOUS: 0
DEPRESSION: 0

## 2017-03-14 ASSESSMENT — CHA2DS2 SCORE
VASCULAR DISEASE: YES
CHF OR LEFT VENTRICULAR DYSFUNCTION: YES
DIABETES: YES
SEX: MALE
AGE 75 OR GREATER: NO
HYPERTENSION: NO
PRIOR STROKE OR TIA OR THROMBOEMBOLISM: NO
AGE 65 TO 74: NO
CHA2DS2 VASC SCORE: 3

## 2017-03-14 ASSESSMENT — PAIN SCALES - GENERAL: PAINLEVEL_OUTOF10: 0

## 2017-03-14 NOTE — DISCHARGE INSTRUCTIONS
Comments: Discharge patient Home   Diet cardiac with 9-13 servings of fruits and vegetables   Activities as tolerated   Follow ups with PCP in 7-10 days, call for appointment   Meds per med rec sheet   No smoking, no alcohol, no caffeine   Wear seat belt in motorized vehicle   Take medications as perscribed   Keep appointments   If symptoms worsen call PCP, 911 or urgent care.Discharge Instructions    Discharged to home by car with relative. Discharged via wheelchair, hospital escort: Yes.  Special equipment needed: Cane    Be sure to schedule a follow-up appointment with your primary care doctor or any specialists as instructed.     Discharge Plan:   Influenza Vaccine Indication: Not indicated: Previously immunized this influenza season and > 8 years of age    I understand that a diet low in cholesterol, fat, and sodium is recommended for good health. Unless I have been given specific instructions below for another diet, I accept this instruction as my diet prescription.   Other diet: per doctor above    Special Instructions: Diagnosis:  Acute Coronary Syndrome (ACS) is a diagnosis that encompasses cardiac-related chest pain and heart attack. ACS occurs when the blood flow to the heart muscle is severely reduced or cut off completely due to a slow process called atherosclerosis.  Atherosclerosis is a disease in which the coronary arteries become narrow from a buildup of fat, cholesterol, and other substances that combine to form plaque. If the plaque breaks, a blood clot will form and block the blood flow to the heart muscle. This lack of blood flow can cause damage or death to the heart muscle which is called a heart attack or Myocardial Infarction (MI). There are two different types of MIs:  ST Elevation Myocardial Infarction or STEMI (the most severe type of heart attack) and Non-ST Elevation Myocardial Infarction or NSTEMI.    Treatment Plan:  · Cardiac Diet  - Low fat, low salt, low cholesterol   · Cardiac  Rehab  - Your doctor has ordered you a referral to [facility].  Call [phone] to schedule an appointment.  · Attend my follow-up appointment with my Cardiologist.  · Take my medications as prescribed by my doctor  · Exercise daily  · Quit Smoking, Lower my bad cholesterol and raise my good cholesterol, lower my blood pressure, Reduce stress and Control my diabetes    Medications:  Certain medications are used to treat ACS.  Remember to always take medications as prescribed and never stop talking medications unless told by your doctor.    You have been prescribed the following medicatons:    Aspirin - Aspirin is used as a blood thinning medication and you will require this medication indefinitely.  Anti-platelet/blood thinner - Your Anti-platelet/Blood thinning medication is called Brilinta, Eliquis, and is used in combination with aspirin to prevent clots from forming in your heart and/or around your stent.  Your doctor will determine how long you need to be on this medicine, but generally if you have a Drug Eluding stent, you will need to take this medication for at least one month, and you have a Bare Metal stent, you will need the medication for at least 3 months.  Some patients require this medication indefinitely.  Beta-Blocker - Beta-Blocker Metoprolol is used to lower blood pressure and heart rate, and/or helps your heart heal after a heart attack.    · Is patient discharged on Warfarin / Coumadin?   No     · Is patient Post Blood Transfusion?  No    Depression / Suicide Risk    As you are discharged from this [facility] facility, it is important to learn how to keep safe from harming yourself.    Recognize the warning signs:  · Abrupt changes in personality, positive or negative- including increase in energy   · Giving away possessions  · Change in eating patterns- significant weight changes-  positive or negative  · Change in sleeping patterns- unable to sleep or sleeping all the time   · Unwillingness  or inability to communicate  · Depression  · Unusual sadness, discouragement and loneliness  · Talk of wanting to die  · Neglect of personal appearance   · Rebelliousness- reckless behavior  · Withdrawal from people/activities they love  · Confusion- inability to concentrate     If you or a loved one observes any of these behaviors or has concerns about self-harm, here's what you can do:  · Talk about it- your feelings and reasons for harming yourself  · Remove any means that you might use to hurt yourself (examples: pills, rope, extension cords, firearm)  · Get professional help from the community (Mental Health, Substance Abuse, psychological counseling)  · Do not be alone:Call your Safe Contact- someone whom you trust who will be there for you.  · Call your local CRISIS HOTLINE 928-0752 or 562-814-0360  · Call your local Children's Mobile Crisis Response Team [place] (392) 432-3547 or www.Tears for Life  · Call the toll free National Suicide Prevention Hotlines   · National Suicide Prevention Lifeline 328-847-VQJC (0590)  · National Hope Line Network 800-SUICIDE (538-0074)      HF Patient Discharge Instructions  · Monitor your weight daily, and maintain a weight chart, to track your weight changes.   · Activity as tolerated, unless your Doctor has ordered otherwise. Other activity order: as tolerated.  · Follow a low fat, low cholesterol, low salt diet unless instructed otherwise by your Doctor. Read the labels on the back of food products and track your intake of fat, cholesterol and salt.   · Fluid Restriction No. If a Fluid Restriction has been ordered by your Doctor, measure fluids with a measuring cup to ensure that you are not exceeding the restriction.   · No smoking.  · Oxygen Yes. If your Doctor has ordered that you wear Oxygen at home, it is important to wear it as ordered.  · Did you receive an explanation from staff on the importance of taking each of your medications and why it is necessary  to keep taking them unless your doctor says to stop? Yes  · Were all of your questions answered about how to manage your heart failure and what to do if you have increased signs and symptoms after you go home? Yes  · Do you feel like your heart failure care team involved you in the care treatment plan and allowed you to make decisions regarding your care while in the hospital and addressed any discharge needs you might have? Yes    See the educational handout provided at discharge for more information on monitoring your daily weight, activity and diet. This also explains more about Heart Failure, symptoms of a flare-up and some of the tests that you have undergone.     Warning Signs of a Flare-Up include:  · Swelling in the ankles or lower legs.  · Shortness of breath, while at rest, or while doing normal activities.   · Shortness of breath at night when in bed, or coughing in bed.   · Requiring more pillows to sleep at night, or needing to sit up at night to sleep.  · Feeling weak, dizzy or fatigued.     When to call your Doctor:  · Call [facility] Telemetry 7th floor about questions regarding the discharge instructions you were given [phone].  · Call your Primary Care Physician or Cardiologist if:   1. You experience any pain radiating to your jaw or neck.  2. You have any difficulty breathing.  3. You experience weight gain of 3 lbs in a day or 5 lbs in a week.   4. You feel any palpitations or irregular heartbeats.  5. You become dizzy or lose consciousness.   If you have had an angiogram or had a pacemaker or AICD placed, and experience:  1. Bleeding, drainage or swelling at the surgical / puncture site.  2. Fever greater than 100.0 F  3. Shock from internal defibrillator.  4. Cool and / or numb extremities.  5.

## 2017-03-14 NOTE — PROGRESS NOTES
Bedside report completed, assumed pt care. Assessment complete. Bruise to right arm assessed, numbness and tingling in fingers r/t radial cath. Pt resting in bed, A&O x 4. Discussed POC with pt. Call light within reach, bed alarm on. Will continue to monitor.

## 2017-03-14 NOTE — DISCHARGE PLANNING
Medical Social Work    Update: LSW left  for [facility] updating that pt is discharging today.

## 2017-03-14 NOTE — PROGRESS NOTES
Pt medicated for pain (headache and right arm). Bruise to right arm from cath looks unchanged from start of shift. Pt instructed to call if pain increases or changes.

## 2017-03-14 NOTE — THERAPY
Physical Therapy Contact Note     PT cardiac rehab consult recieved. Pt feels he does not have acute PT cardiac rehab needs at this time. Pt is up-self. Discussed general walking program consistant with phase I cardiac rehab and activity tolerance using the RPE scale; pt highly receptive of education and anticipate compliance. Pt expresses interest in outpatient cardiac rehab when in the appropriate phase of healing, please refer pt at that point. Pt with no further acute PT needs at this time. Recommend return home when medically appropriate.    Toya/Jessica: [phone]

## 2017-03-14 NOTE — PROGRESS NOTES
Attempted to call this [facility] primary care physician, Dr. Mark Donaldson, in [place]. Rerouted to an answering service. Informed that a call would need to be placed to the medical records department at [phone] between the hours of 8:30 and 5.

## 2017-03-14 NOTE — PROGRESS NOTES
Oncology/Hematology Progress Note               Author: Suzan Sims Date & Time created: 3/14/2017  12:05 PM   CC:  MPD  Interval History:    NO issues overnight.  He is tolerating his hydrea so far.  He has no rash.    Review of Systems:  Review of Systems   Constitutional: Positive for malaise/fatigue. Negative for fever and chills.   HENT: Negative.    Eyes: Negative for blurred vision and double vision.   Respiratory: Negative for cough and hemoptysis.    Cardiovascular: Negative.  Negative for chest pain and palpitations.   Gastrointestinal: Negative for heartburn, nausea and vomiting.   Genitourinary: Negative for dysuria and urgency.   Musculoskeletal: Negative for myalgias.   Skin: Negative for rash.   Neurological: Negative for dizziness and tingling.   Endo/Heme/Allergies: Does not bruise/bleed easily.   Psychiatric/Behavioral: Negative for depression. The patient is not nervous/anxious.        Physical Exam:  Physical Exam   Constitutional: He is oriented to person, place, and time. He appears well-developed.   HENT:   Head: Normocephalic.   Eyes: Pupils are equal, round, and reactive to light.   Cardiovascular: Normal rate and regular rhythm.    Pulmonary/Chest: Effort normal and breath sounds normal.   Abdominal: Soft. Bowel sounds are normal. There is no tenderness. There is no rebound.   Musculoskeletal: He exhibits no edema.   Neurological: He is alert and oriented to person, place, and time.   Skin: No rash noted. No erythema.   Psychiatric: He has a normal mood and affect. His behavior is normal. Judgment and thought content normal.       Labs:        Invalid input(s): [id number]  Recent Labs      03/11/17   1602  03/12/17   0540  03/12/17   1300  03/13/17   0822  03/14/17   0150   TROPONINI  0.02  29.50*  23.91*   --    --    BNPBTYPENAT  146*   --    --   100  131*     Recent Labs      03/12/17   0540  03/13/17   0822  03/14/17   0150   SODIUM  126*  135  139   POTASSIUM  4.0  3.7  3.8    CHLORIDE  100  109  108   CO2  19*  20  20   BUN  24*  18  16   CREATININE  1.63*  1.13  1.01   MAGNESIUM   --   1.9   --    PHOSPHORUS   --   3.4   --    CALCIUM  8.6  8.6  8.7     Recent Labs      17   1525  17   1602  17   0540  17   0822  17   0150   ALTSGPT  38  34   --    --    --    ASTSGOT  36  29   --    --    --    ALKPHOSPHAT  73  67   --    --    --    TBILIRUBIN  1.5  1.2   --    --    --    LIPASE   --   29   --    --    --    GLUCOSE  255*  251*  195*  79  91     Recent Labs      17   1525  17   1602  17   0540  17   0817   0150   RBC  6.59*  6.59*  5.69  5.81  5.69   HEMOGLOBIN  12.2*  12.3*  10.6*  10.5*  10.6*   HEMATOCRIT  42.1  41.7*  35.3*  37.1*  36.2*   PLATELETCT  1160*  1053*  965*  914*  912*   PROTHROMBTM  19.2*  19.8*   --    --    --    APTT  33.1  31.9   --    --    --    INR  1.64*  1.63*   --    --    --      Recent Labs      17   1525  17   1602  17   0540  17   0822  17   0150   WBC  16.9*  16.8*  16.7*  14.0*  12.7*   NEUTSPOLYS  95.40*  97.40*  94.80*  84.30*  77.00*   LYMPHOCYTES  1.40*  0.90*  0.90*  6.00*  10.70*   MONOCYTES  1.90  0.00  0.00  4.90  5.40   EOSINOPHILS  0.30  0.00  0.00  3.10  4.30   BASOPHILS  0.20  0.00  0.80  1.10  2.20*   ASTSGOT  36  29   --    --    --    ALTSGPT  38  34   --    --    --    ALKPHOSPHAT  73  67   --    --    --    TBILIRUBIN  1.5  1.2   --    --    --      Recent Labs      17   0540  17   0822  17   0150   SODIUM  126*  135  139   POTASSIUM  4.0  3.7  3.8   CHLORIDE  100  109  108   CO2  19*  20  20   GLUCOSE  195*  79  91   BUN  24*  18  16   CREATININE  1.63*  1.13  1.01   CALCIUM  8.6  8.6  8.7     Hemodynamics:  Temp (24hrs), Av.6 °C (97.8 °F), Min:36.1 °C (97 °F), Max:36.9 °C (98.5 °F)  Temperature: 36.8 °C (98.2 °F)  Pulse  Av.8  Min: 60  Max: 103   Blood Pressure: 104/73 mmHg     Respiratory:    Respiration: 16,  Pulse Oximetry: 95 %        RUL Breath Sounds: Clear, RML Breath Sounds: Clear, RLL Breath Sounds: Clear, EVELINA Breath Sounds: Clear, LLL Breath Sounds: Clear  Fluids:    Intake/Output Summary (Last 24 hours) at 03/13/17 1451  Last data filed at 03/13/17 1200   Gross per 24 hour   Intake 1677.5 ml   Output   2675 ml   Net -997.5 ml     Weight: 97.3 kg (214 lb 8.1 oz)  GI/Nutrition:  Orders Placed This Encounter   Procedures   • Diet Order     Standing Status: Standing      Number of Occurrences: 1      Standing Expiration Date:      Order Specific Question:  Diet:     Answer:  Cardiac [6]     Order Specific Question:  Diet:     Answer:  Diabetic [3]     Medical Decision Making, by Problem:  Active Hospital Problems    Diagnosis   • *STEMI (ST elevation myocardial infarction) (CMS-HCC) [I21.3]   • ARF (acute renal failure) (CMS-HCC) [N17.9]   • CAD (coronary artery disease) [I25.10]   • Thrombocytosis (CMS-HCC) [D47.3]   • Leukocytosis [D72.829]   • Hyponatremia [E87.1]   • Coagulopathy (CMS-HCC) [D68.9]   • Chronic systolic CHF (congestive heart failure) (CMS-HCC) [I50.22]   • DM2 (diabetes mellitus, type 2) (CMS-HCC) [E11.9]   • BPH (benign prostatic hyperplasia) [N40.0]   • Paroxysmal atrial fibrillation (CMS-HCC) [I48.0]   • Polycythemia [D75.1]   • Dyslipidemia [E78.5]     PMHX:  He tell me that he was exposed to agent orange for 2 yrs when he was younger.      Plan:  1.  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  Diagnosed 2008 in setting of acute MI with Hgb ~56, unknown platelet count. No bone marrow done.  Records not available but per patient it has been managed since that time primarily with phlebotomy and low dose hydrea (500 mg daily).  He reports a rash on upper body that may have been correlated with hydrea use in a dose-dependent fashion.  With his polycythemia and thrombocythemia, hydrea would be the best option for him because it would reduce both cell lines and Dr Flores did discuss with  him and wife another trial of Hydrea.  They were amenable and started with 1000 mg PO daily.      He has done well with hydrea 1000 mg daily.  We discussed increasing hydrea to 1500 mg since platelets stable but still over 900k.  He wants to leave hospital and be monitored outpatient.  He is planning on leaving [facility] and moving to [place]. He understands the pro's/ con's of leaving hospital.    He will do cbc on Friday and call for results.  He will follow up with [facility] next week.  He will left our office know when he is ready to transfer care to [place].    He understands the importance of close follow up.       High complexity/ Discussed with patient, , primary team.  >35 min >50% face to face    Labs reviewed, Radiology images reviewed and Medications reviewed

## 2017-03-15 ENCOUNTER — PATIENT OUTREACH (OUTPATIENT)
Dept: HEALTH INFORMATION MANAGEMENT | Facility: OTHER | Age: 67
End: 2017-03-15

## 2017-03-15 NOTE — PROGRESS NOTES
Discharged home with several family members. Aware to have blood drawn on Friday and to follow up with [facility] physician next week. New scripts reviewed as well as previous meds to continue at home. Pt voices good understanding of self care management of heart failure and post heart attack. Has managed both in the past. Ambulating independently. No pain. Eating and drinking well. Took all belongings home

## 2017-03-15 NOTE — DISCHARGE SUMMARY
DATE OF ADMISSION:  03/11/2017    DATE OF DISCHARGE:  03/14/2017    PRINCIPAL DIAGNOSES:  1.  ST elevation myocardial infarction.  2.  Severe thrombocytosis.  3.  Acute renal failure.  4.  Coronary artery disease.  5.  Leukocytosis.  6.  Hyponatremia.  7.  Coagulopathy.  8.  Chronic systolic heart failure.  9.  Diabetes mellitus type 2.  10.  Benign prostatic hypertrophy.  11.  Paroxysmal atrial fibrillation.  12.  Polycythemia.  13.  Dyslipidemia.    CONSULTATIONS DONE ON THIS HOSPITAL STAY:  Includes hematology consultation   with Dr. Becka Flores, cardiology consultation with Dr. Adin Neil.    INTERVENTIONS DONE ON THIS HOSPITAL STAY:  Includes left heart   catheterization, left ventriculography, aspiration thrombectomy, proximal LAD   stent, mid and distal LAD stent.    BLOOD OR BLOOD PRODUCTS RECEIVED ON THIS HOSPITAL STAY:  None.    DIAGNOSTIC EVALUATIONS DONE ON THIS HOSPITAL STAY:  Include portable chest   x-ray 03/11/2017, which shows no evidence of acute cardiopulmonary process.    Echocardiogram 03/12/2017, which shows a left ventricular ejection fraction   45%, septal wall akinesis, right heart pressures normal, aortic sclerosis   without stenosis, mild aortic insufficiency.    COURSE OF HOSPITALIZATION:  Patient is a 66-year-old gentleman who was   admitted by my partner, Dr. Patten.  Please refer to the Dr. Patten's H and   P for complete details.    Patient is a 66-year-old gentleman who had to be admitted because of recurrent   chest pain.  The patient was known to have coronary artery disease with   previous stent.  The patient apparently has thrombocyte counts over 1000 and   had an in-stent stenosis with a thrombus inside the stent.  The patient thus   had to have urgent thrombectomy and had 2 more stents placed.  Patient   post-catheterization had medical optimization done.  Because of the elevated   platelets, hematology was consulted.  At this point, the patient was placed on   Hydrea  initially 500 mg and was titrated 2000, then now to 1500.  The Hydrea   has dropped his platelet counts, most recent platelet count is down to 912.    Initial platelet count on admission was 1160.  The patient is known to have   thrombocytosis.  He was [facility] seen [facility] the [facility].  The patient   at this point has been cleared by cardiology for discharge with combination of   beta-blockers, ACE inhibitors, aspirin, and Brilinta and I have spoken in   detail with Dr. Sims of hematology.  He at this point recommends continued   monitoring of his platelet counts as an outpatient and continued   administration of the Hydrea.  I have spoken with his physicians at [facility] who   were recommending that the patient be transferred over there for continued   management; however, the patient says that he does not wish to go over there,   he rather do this as an outpatient, he is going to stay in [place].  Thus, at   this point, patient will be discharged home with outpatient followups and   management.    DISPOSITION:  Discharge home.    DIET:  Cardiac diet.    ACTIVITIES:  As tolerated.    FOLLOWUP:  Followup will be with his family doctor here in [facility] and then he   also will follow with cardiology.    MEDICATIONS AT THE TIME OF DISCHARGE:  Include:  1.  Aspirin 81 mg daily.  2.  Lipitor 40 mg p.o. daily.  3.  Fludrocortisone 0.1 mg daily, this is for her hyponatremia that he   apparently had in the past and since initiation of the fludrocortisone, this   has resolved.  4.  Brilinta 90 mg twice daily.  5.  He is also on hydroxyurea 1500 mg daily.  6.  Eliquis 5 mg twice daily.  7.  Cialis 5 mg daily.  8.  Welchol 625 mg twice daily.  9.  Benadryl 50 mg every 6 hours p.r.n. for itching.  10.  Proscar 5 mg daily.  11.  Glipizide 10 mg q.a.m.  12.  Lantus insulin 20 units at bedtime.  13.  Metoprolol 25 mg daily of the XR.  14.  Bactroban 2% cream applied twice daily.  15.  Bactrim double strength 1 tablet every 12  hours.  16.  Flomax 0.4 mg 2 times daily.    DISCHARGE INSTRUCTIONS:  Patient at this point is advised no smoking, no   alcohol, no caffeine, wear seatbelt in a motorized vehicle, take medications   as prescribed, keep appointments as scheduled.  If symptoms worsen or new ones   develop, call the primary care physician's office, 911 or the nearest urgent   care facility.    This discharge process lasted 45 minutes.       ____________________________________     MD DEJON LANDIN / DEANNA    DD:  03/15/2017 06:49:34  DT:  03/15/2017 08:02:37    D#:  [id number]  Job#:  [id number]    cc: CHRISTIANO JUAREZ MD, Becka Flores MD, MONY ANDERSON MD

## 2017-03-16 ENCOUNTER — RESOLUTE PROFESSIONAL BILLING HOSPITAL PROF FEE (OUTPATIENT)
Dept: HOSPITALIST | Facility: MEDICAL CENTER | Age: 67
End: 2017-03-16
Payer: COMMERCIAL

## 2017-03-16 ENCOUNTER — APPOINTMENT (OUTPATIENT)
Dept: RADIOLOGY | Facility: MEDICAL CENTER | Age: 67
DRG: 815 | End: 2017-03-16
Attending: EMERGENCY MEDICINE
Payer: COMMERCIAL

## 2017-03-16 ENCOUNTER — HOSPITAL ENCOUNTER (INPATIENT)
Facility: MEDICAL CENTER | Age: 67
LOS: 5 days | DRG: 815 | End: 2017-03-21
Attending: EMERGENCY MEDICINE | Admitting: HOSPITALIST
Payer: COMMERCIAL

## 2017-03-16 DIAGNOSIS — M25.50 ARTHRALGIA, UNSPECIFIED JOINT: ICD-10-CM

## 2017-03-16 DIAGNOSIS — R51.9 ACUTE INTRACTABLE HEADACHE, UNSPECIFIED HEADACHE TYPE: ICD-10-CM

## 2017-03-16 DIAGNOSIS — I95.9 HYPOTENSION, UNSPECIFIED HYPOTENSION TYPE: ICD-10-CM

## 2017-03-16 PROBLEM — R65.10 SIRS (SYSTEMIC INFLAMMATORY RESPONSE SYNDROME) (HCC): Status: ACTIVE | Noted: 2017-03-16

## 2017-03-16 LAB
ALBUMIN SERPL BCP-MCNC: 3.5 G/DL (ref 3.2–4.9)
ALBUMIN/GLOB SERPL: 1 G/DL
ALP SERPL-CCNC: 57 U/L (ref 30–99)
ALT SERPL-CCNC: 38 U/L (ref 2–50)
ANION GAP SERPL CALC-SCNC: 9 MMOL/L (ref 0–11.9)
ANISOCYTOSIS BLD QL SMEAR: ABNORMAL
APPEARANCE UR: CLEAR
APTT PPP: 35.7 SEC (ref 24.7–36)
AST SERPL-CCNC: 32 U/L (ref 12–45)
BASOPHILS # BLD AUTO: 0.6 % (ref 0–1.8)
BASOPHILS # BLD: 0.11 K/UL (ref 0–0.12)
BILIRUB SERPL-MCNC: 1.2 MG/DL (ref 0.1–1.5)
BILIRUB UR QL STRIP.AUTO: NEGATIVE
BNP SERPL-MCNC: 104 PG/ML (ref 0–100)
BUN SERPL-MCNC: 18 MG/DL (ref 8–22)
CALCIUM SERPL-MCNC: 9.2 MG/DL (ref 8.5–10.5)
CHLORIDE SERPL-SCNC: 103 MMOL/L (ref 96–112)
CK SERPL-CCNC: 21 U/L (ref 0–154)
CO2 SERPL-SCNC: 21 MMOL/L (ref 20–33)
COLOR UR: YELLOW
COMMENT 1642: NORMAL
CREAT SERPL-MCNC: 1.37 MG/DL (ref 0.5–1.4)
CRP SERPL HS-MCNC: 35.3 MG/L (ref 0–7.5)
EOSINOPHIL # BLD AUTO: 0.13 K/UL (ref 0–0.51)
EOSINOPHIL NFR BLD: 0.7 % (ref 0–6.9)
ERYTHROCYTE [DISTWIDTH] IN BLOOD BY AUTOMATED COUNT: 42.9 FL (ref 35.9–50)
ERYTHROCYTE [SEDIMENTATION RATE] IN BLOOD BY WESTERGREN METHOD: 8 MM/HOUR (ref 0–20)
FLUAV H1 2009 PAND RNA SPEC QL NAA+PROBE: NOT DETECTED
FLUAV RNA SPEC QL NAA+PROBE: NEGATIVE
FLUAV+FLUBV AG SPEC QL IA: NORMAL
FLUBV RNA SPEC QL NAA+PROBE: NEGATIVE
GFR SERPL CREATININE-BSD FRML MDRD: 52 ML/MIN/1.73 M 2
GLOBULIN SER CALC-MCNC: 3.6 G/DL (ref 1.9–3.5)
GLUCOSE BLD-MCNC: 70 MG/DL (ref 65–99)
GLUCOSE SERPL-MCNC: 180 MG/DL (ref 65–99)
GLUCOSE UR STRIP.AUTO-MCNC: NEGATIVE MG/DL
HCT VFR BLD AUTO: 42.5 % (ref 42–52)
HGB BLD-MCNC: 12.4 G/DL (ref 14–18)
HYPOCHROMIA BLD QL SMEAR: ABNORMAL
IMM GRANULOCYTES # BLD AUTO: 0.14 K/UL (ref 0–0.11)
IMM GRANULOCYTES NFR BLD AUTO: 0.7 % (ref 0–0.9)
INR PPP: 1.76 (ref 0.87–1.13)
KETONES UR STRIP.AUTO-MCNC: NEGATIVE MG/DL
LACTATE BLD-SCNC: 1.5 MMOL/L (ref 0.5–2)
LACTATE BLD-SCNC: 2.2 MMOL/L (ref 0.5–2)
LACTATE BLD-SCNC: 3.9 MMOL/L (ref 0.5–2)
LEUKOCYTE ESTERASE UR QL STRIP.AUTO: NEGATIVE
LG PLATELETS BLD QL SMEAR: NORMAL
LYMPHOCYTES # BLD AUTO: 0.46 K/UL (ref 1–4.8)
LYMPHOCYTES NFR BLD: 2.3 % (ref 22–41)
MCH RBC QN AUTO: 18.7 PG (ref 27–33)
MCHC RBC AUTO-ENTMCNC: 29.2 G/DL (ref 33.7–35.3)
MCV RBC AUTO: 64.1 FL (ref 81.4–97.8)
MICRO URNS: NORMAL
MICROCYTES BLD QL SMEAR: ABNORMAL
MONOCYTES # BLD AUTO: 0.45 K/UL (ref 0–0.85)
MONOCYTES NFR BLD AUTO: 2.3 % (ref 0–13.4)
MORPHOLOGY BLD-IMP: NORMAL
NEUTROPHILS # BLD AUTO: 18.65 K/UL (ref 1.82–7.42)
NEUTROPHILS NFR BLD: 93.4 % (ref 44–72)
NITRITE UR QL STRIP.AUTO: NEGATIVE
NRBC # BLD AUTO: 0.05 K/UL
NRBC BLD AUTO-RTO: 0.3 /100 WBC
OVALOCYTES BLD QL SMEAR: NORMAL
PH UR STRIP.AUTO: 6 [PH]
PLATELET # BLD AUTO: 1011 K/UL (ref 164–446)
PLATELET BLD QL SMEAR: NORMAL
PMV BLD AUTO: 9.7 FL (ref 9–12.9)
POIKILOCYTOSIS BLD QL SMEAR: NORMAL
POTASSIUM SERPL-SCNC: 3.8 MMOL/L (ref 3.6–5.5)
PROT SERPL-MCNC: 7.1 G/DL (ref 6–8.2)
PROT UR QL STRIP: NEGATIVE MG/DL
PROTHROMBIN TIME: 21.1 SEC (ref 12–14.6)
RBC # BLD AUTO: 6.63 M/UL (ref 4.7–6.1)
RBC BLD AUTO: PRESENT
RBC UR QL AUTO: NEGATIVE
SIGNIFICANT IND 70042: NORMAL
SITE SITE: NORMAL
SODIUM SERPL-SCNC: 133 MMOL/L (ref 135–145)
SOURCE SOURCE: NORMAL
SP GR UR STRIP.AUTO: 1.01
TROPONIN I SERPL-MCNC: 2.66 NG/ML (ref 0–0.04)
WBC # BLD AUTO: 19.9 K/UL (ref 4.8–10.8)

## 2017-03-16 PROCEDURE — 96361 HYDRATE IV INFUSION ADD-ON: CPT

## 2017-03-16 PROCEDURE — 770020 HCHG ROOM/CARE - TELE (206)

## 2017-03-16 PROCEDURE — 85652 RBC SED RATE AUTOMATED: CPT

## 2017-03-16 PROCEDURE — A9270 NON-COVERED ITEM OR SERVICE: HCPCS | Performed by: HOSPITALIST

## 2017-03-16 PROCEDURE — 82962 GLUCOSE BLOOD TEST: CPT

## 2017-03-16 PROCEDURE — A9270 NON-COVERED ITEM OR SERVICE: HCPCS | Performed by: EMERGENCY MEDICINE

## 2017-03-16 PROCEDURE — 87400 INFLUENZA A/B EACH AG IA: CPT

## 2017-03-16 PROCEDURE — 87086 URINE CULTURE/COLONY COUNT: CPT

## 2017-03-16 PROCEDURE — 700105 HCHG RX REV CODE 258: Performed by: HOSPITALIST

## 2017-03-16 PROCEDURE — 82550 ASSAY OF CK (CPK): CPT

## 2017-03-16 PROCEDURE — 71010 DX-CHEST-PORTABLE (1 VIEW): CPT

## 2017-03-16 PROCEDURE — 87503 INFLUENZA DNA AMP PROB ADDL: CPT

## 2017-03-16 PROCEDURE — 94760 N-INVAS EAR/PLS OXIMETRY 1: CPT

## 2017-03-16 PROCEDURE — 86141 C-REACTIVE PROTEIN HS: CPT

## 2017-03-16 PROCEDURE — 36415 COLL VENOUS BLD VENIPUNCTURE: CPT

## 2017-03-16 PROCEDURE — 93005 ELECTROCARDIOGRAM TRACING: CPT

## 2017-03-16 PROCEDURE — 99285 EMERGENCY DEPT VISIT HI MDM: CPT

## 2017-03-16 PROCEDURE — 96375 TX/PRO/DX INJ NEW DRUG ADDON: CPT

## 2017-03-16 PROCEDURE — 96365 THER/PROPH/DIAG IV INF INIT: CPT

## 2017-03-16 PROCEDURE — 87040 BLOOD CULTURE FOR BACTERIA: CPT | Mod: 91

## 2017-03-16 PROCEDURE — 700105 HCHG RX REV CODE 258: Performed by: EMERGENCY MEDICINE

## 2017-03-16 PROCEDURE — 83880 ASSAY OF NATRIURETIC PEPTIDE: CPT

## 2017-03-16 PROCEDURE — 93005 ELECTROCARDIOGRAM TRACING: CPT | Performed by: EMERGENCY MEDICINE

## 2017-03-16 PROCEDURE — 80053 COMPREHEN METABOLIC PANEL: CPT

## 2017-03-16 PROCEDURE — 84484 ASSAY OF TROPONIN QUANT: CPT

## 2017-03-16 PROCEDURE — 700102 HCHG RX REV CODE 250 W/ 637 OVERRIDE(OP): Performed by: HOSPITALIST

## 2017-03-16 PROCEDURE — 700102 HCHG RX REV CODE 250 W/ 637 OVERRIDE(OP): Performed by: EMERGENCY MEDICINE

## 2017-03-16 PROCEDURE — 83605 ASSAY OF LACTIC ACID: CPT

## 2017-03-16 PROCEDURE — 81003 URINALYSIS AUTO W/O SCOPE: CPT

## 2017-03-16 PROCEDURE — 85610 PROTHROMBIN TIME: CPT

## 2017-03-16 PROCEDURE — 85730 THROMBOPLASTIN TIME PARTIAL: CPT

## 2017-03-16 PROCEDURE — 99223 1ST HOSP IP/OBS HIGH 75: CPT | Performed by: HOSPITALIST

## 2017-03-16 PROCEDURE — 85025 COMPLETE CBC W/AUTO DIFF WBC: CPT

## 2017-03-16 PROCEDURE — 87502 INFLUENZA DNA AMP PROBE: CPT

## 2017-03-16 PROCEDURE — 70450 CT HEAD/BRAIN W/O DYE: CPT

## 2017-03-16 PROCEDURE — 700111 HCHG RX REV CODE 636 W/ 250 OVERRIDE (IP): Performed by: EMERGENCY MEDICINE

## 2017-03-16 RX ORDER — FINASTERIDE 5 MG/1
5 TABLET, FILM COATED ORAL DAILY
Status: DISCONTINUED | OUTPATIENT
Start: 2017-03-17 | End: 2017-03-21 | Stop reason: HOSPADM

## 2017-03-16 RX ORDER — TAMSULOSIN HYDROCHLORIDE 0.4 MG/1
0.4 CAPSULE ORAL 2 TIMES DAILY
Status: DISCONTINUED | OUTPATIENT
Start: 2017-03-16 | End: 2017-03-21 | Stop reason: HOSPADM

## 2017-03-16 RX ORDER — SULFAMETHOXAZOLE AND TRIMETHOPRIM 800; 160 MG/1; MG/1
1 TABLET ORAL 2 TIMES DAILY
Status: ON HOLD | COMMUNITY
Start: 2017-02-23 | End: 2017-03-21

## 2017-03-16 RX ORDER — GLIPIZIDE 5 MG/1
10 TABLET ORAL EVERY EVENING
Status: DISCONTINUED | OUTPATIENT
Start: 2017-03-16 | End: 2017-03-21 | Stop reason: HOSPADM

## 2017-03-16 RX ORDER — HYDROXYUREA 500 MG/1
2000 CAPSULE ORAL DAILY
Status: DISCONTINUED | OUTPATIENT
Start: 2017-03-17 | End: 2017-03-21 | Stop reason: HOSPADM

## 2017-03-16 RX ORDER — INSULIN GLARGINE 100 [IU]/ML
20 INJECTION, SOLUTION SUBCUTANEOUS NIGHTLY
Status: DISCONTINUED | OUTPATIENT
Start: 2017-03-16 | End: 2017-03-21 | Stop reason: HOSPADM

## 2017-03-16 RX ORDER — HYDROXYUREA 500 MG/1
1000 CAPSULE ORAL ONCE
Status: COMPLETED | OUTPATIENT
Start: 2017-03-16 | End: 2017-03-16

## 2017-03-16 RX ORDER — AMOXICILLIN 250 MG
2 CAPSULE ORAL 2 TIMES DAILY
Status: DISCONTINUED | OUTPATIENT
Start: 2017-03-16 | End: 2017-03-21 | Stop reason: HOSPADM

## 2017-03-16 RX ORDER — SODIUM CHLORIDE 9 MG/ML
30 INJECTION, SOLUTION INTRAVENOUS
Status: DISCONTINUED | OUTPATIENT
Start: 2017-03-16 | End: 2017-03-21 | Stop reason: HOSPADM

## 2017-03-16 RX ORDER — SODIUM CHLORIDE 9 MG/ML
1000 INJECTION, SOLUTION INTRAVENOUS ONCE
Status: COMPLETED | OUTPATIENT
Start: 2017-03-16 | End: 2017-03-16

## 2017-03-16 RX ORDER — SODIUM CHLORIDE 9 MG/ML
500 INJECTION, SOLUTION INTRAVENOUS
Status: DISCONTINUED | OUTPATIENT
Start: 2017-03-16 | End: 2017-03-21 | Stop reason: HOSPADM

## 2017-03-16 RX ORDER — BISACODYL 10 MG
10 SUPPOSITORY, RECTAL RECTAL
Status: DISCONTINUED | OUTPATIENT
Start: 2017-03-16 | End: 2017-03-21 | Stop reason: HOSPADM

## 2017-03-16 RX ORDER — MORPHINE SULFATE 4 MG/ML
4 INJECTION, SOLUTION INTRAMUSCULAR; INTRAVENOUS ONCE
Status: COMPLETED | OUTPATIENT
Start: 2017-03-16 | End: 2017-03-16

## 2017-03-16 RX ORDER — ONDANSETRON 2 MG/ML
4 INJECTION INTRAMUSCULAR; INTRAVENOUS EVERY 4 HOURS PRN
Status: DISCONTINUED | OUTPATIENT
Start: 2017-03-16 | End: 2017-03-21 | Stop reason: HOSPADM

## 2017-03-16 RX ORDER — SODIUM CHLORIDE 9 MG/ML
INJECTION, SOLUTION INTRAVENOUS CONTINUOUS
Status: DISCONTINUED | OUTPATIENT
Start: 2017-03-16 | End: 2017-03-21

## 2017-03-16 RX ORDER — OXYCODONE HYDROCHLORIDE 5 MG/1
5 TABLET ORAL EVERY 4 HOURS PRN
Status: DISCONTINUED | OUTPATIENT
Start: 2017-03-16 | End: 2017-03-18

## 2017-03-16 RX ORDER — PROMETHAZINE HYDROCHLORIDE 25 MG/1
12.5-25 SUPPOSITORY RECTAL EVERY 4 HOURS PRN
Status: DISCONTINUED | OUTPATIENT
Start: 2017-03-16 | End: 2017-03-21 | Stop reason: HOSPADM

## 2017-03-16 RX ORDER — ASPIRIN 81 MG/1
81 TABLET, CHEWABLE ORAL DAILY
Status: DISCONTINUED | OUTPATIENT
Start: 2017-03-17 | End: 2017-03-21 | Stop reason: HOSPADM

## 2017-03-16 RX ORDER — COLESEVELAM 180 1/1
625 TABLET ORAL 2 TIMES DAILY WITH MEALS
Status: DISCONTINUED | OUTPATIENT
Start: 2017-03-17 | End: 2017-03-21 | Stop reason: HOSPADM

## 2017-03-16 RX ORDER — METOPROLOL SUCCINATE 25 MG/1
25 TABLET, EXTENDED RELEASE ORAL DAILY
Status: DISCONTINUED | OUTPATIENT
Start: 2017-03-17 | End: 2017-03-16

## 2017-03-16 RX ORDER — SODIUM CHLORIDE 9 MG/ML
30 INJECTION, SOLUTION INTRAVENOUS ONCE
Status: COMPLETED | OUTPATIENT
Start: 2017-03-16 | End: 2017-03-16

## 2017-03-16 RX ORDER — PROMETHAZINE HYDROCHLORIDE 25 MG/1
12.5-25 TABLET ORAL EVERY 4 HOURS PRN
Status: DISCONTINUED | OUTPATIENT
Start: 2017-03-16 | End: 2017-03-21 | Stop reason: HOSPADM

## 2017-03-16 RX ORDER — AMPICILLIN AND SULBACTAM 2; 1 G/1; G/1
3 INJECTION, POWDER, FOR SOLUTION INTRAMUSCULAR; INTRAVENOUS ONCE
Status: DISCONTINUED | OUTPATIENT
Start: 2017-03-16 | End: 2017-03-16 | Stop reason: ALTCHOICE

## 2017-03-16 RX ORDER — ONDANSETRON 2 MG/ML
4 INJECTION INTRAMUSCULAR; INTRAVENOUS ONCE
Status: COMPLETED | OUTPATIENT
Start: 2017-03-16 | End: 2017-03-16

## 2017-03-16 RX ORDER — CEFTRIAXONE 2 G/1
2 INJECTION, POWDER, FOR SOLUTION INTRAMUSCULAR; INTRAVENOUS ONCE
Status: COMPLETED | OUTPATIENT
Start: 2017-03-16 | End: 2017-03-16

## 2017-03-16 RX ORDER — POLYETHYLENE GLYCOL 3350 17 G/17G
1 POWDER, FOR SOLUTION ORAL
Status: DISCONTINUED | OUTPATIENT
Start: 2017-03-16 | End: 2017-03-21 | Stop reason: HOSPADM

## 2017-03-16 RX ORDER — FLUDROCORTISONE ACETATE 0.1 MG/1
0.1 TABLET ORAL EVERY MORNING
Status: DISCONTINUED | OUTPATIENT
Start: 2017-03-17 | End: 2017-03-21 | Stop reason: HOSPADM

## 2017-03-16 RX ORDER — ONDANSETRON 4 MG/1
4 TABLET, ORALLY DISINTEGRATING ORAL EVERY 4 HOURS PRN
Status: DISCONTINUED | OUTPATIENT
Start: 2017-03-16 | End: 2017-03-21 | Stop reason: HOSPADM

## 2017-03-16 RX ORDER — ACETAMINOPHEN 325 MG/1
650 TABLET ORAL EVERY 4 HOURS PRN
Status: DISCONTINUED | OUTPATIENT
Start: 2017-03-16 | End: 2017-03-21 | Stop reason: HOSPADM

## 2017-03-16 RX ORDER — HYDROXYUREA 500 MG/1
1500 CAPSULE ORAL DAILY
Status: DISCONTINUED | OUTPATIENT
Start: 2017-03-17 | End: 2017-03-16

## 2017-03-16 RX ADMIN — SODIUM CHLORIDE: 9 INJECTION, SOLUTION INTRAVENOUS at 20:00

## 2017-03-16 RX ADMIN — MORPHINE SULFATE 4 MG: 4 INJECTION INTRAVENOUS at 17:38

## 2017-03-16 RX ADMIN — SODIUM CHLORIDE 1000 ML: 9 INJECTION, SOLUTION INTRAVENOUS at 17:10

## 2017-03-16 RX ADMIN — TICAGRELOR 90 MG: 90 TABLET ORAL at 21:55

## 2017-03-16 RX ADMIN — OXYCODONE HYDROCHLORIDE 5 MG: 5 TABLET ORAL at 20:29

## 2017-03-16 RX ADMIN — HYDROXYUREA 1000 MG: 500 CAPSULE ORAL at 21:59

## 2017-03-16 RX ADMIN — APIXABAN 5 MG: 5 TABLET, FILM COATED ORAL at 21:55

## 2017-03-16 RX ADMIN — ONDANSETRON 4 MG: 2 INJECTION, SOLUTION INTRAMUSCULAR; INTRAVENOUS at 17:38

## 2017-03-16 RX ADMIN — STANDARDIZED SENNA CONCENTRATE AND DOCUSATE SODIUM 2 TABLET: 8.6; 5 TABLET, FILM COATED ORAL at 22:14

## 2017-03-16 RX ADMIN — GLIPIZIDE 10 MG: 5 TABLET ORAL at 21:56

## 2017-03-16 RX ADMIN — TAMSULOSIN HYDROCHLORIDE 0.4 MG: 0.4 CAPSULE ORAL at 20:29

## 2017-03-16 RX ADMIN — SODIUM CHLORIDE 859 ML: 9 INJECTION, SOLUTION INTRAVENOUS at 18:42

## 2017-03-16 RX ADMIN — SODIUM CHLORIDE 1000 ML: 9 INJECTION, SOLUTION INTRAVENOUS at 17:38

## 2017-03-16 RX ADMIN — CEFTRIAXONE SODIUM 2 G: 2 INJECTION, POWDER, FOR SOLUTION INTRAMUSCULAR; INTRAVENOUS at 19:57

## 2017-03-16 ASSESSMENT — PAIN SCALES - GENERAL: PAINLEVEL_OUTOF10: 4

## 2017-03-16 ASSESSMENT — LIFESTYLE VARIABLES
ALCOHOL_USE: NO
EVER_SMOKED: NEVER
DO YOU DRINK ALCOHOL: NO

## 2017-03-16 NOTE — IP AVS SNAPSHOT
[email or web address] Access Code: [email or web address]  Expires: 4/8/2017 12:47 PM    [email or web address]  A secure, online tool to manage your health information     [employer]’s Attachments.[email or web address]® is a secure, online tool that connects you to your personalized health information from the privacy of your home -- day or night - making it very easy for you to manage your healthcare. Once the activation process is completed, you can even access your medical information using the [email or web address] rayna, which is available for free in the Apple Rayna store or Google Play store.     [email or web address] provides the following levels of access (as shown below):   My Chart Features   [facility] Primary Care Doctor [facility]  Specialists [facility]  Urgent  Care Non-[facility]  Primary Care  Doctor   Email your healthcare team securely and privately 24/7 X X X X   Manage appointments: schedule your next appointment; view details of past/upcoming appointments X      Request prescription refills. X      View recent personal medical records, including lab and immunizations X X X X   View health record, including health history, allergies, medications X X X X   Read reports about your outpatient visits, procedures, consult and ER notes X X X X   See your discharge summary, which is a recap of your hospital and/or ER visit that includes your diagnosis, lab results, and care plan. X X       How to register for [email or web address]:  1. Go to  [email or web address].  2. Click on the Sign Up Now box, which takes you to the New Member Sign Up page. You will need to provide the following information:  a. Enter your [email or web address] Access Code exactly as it appears at the top of this page. (You will not need to use this code after you’ve completed the sign-up process. If you do not sign up before the expiration date, you must request a new code.)   b. Enter your date of birth.   c. Enter your home email address.   d. Click Submit, and follow the next screen’s instructions.  3. Create a [email or web address] ID. This will be your [email or web address]  login ID and cannot be changed, so think of one that is secure and easy to remember.  4. Create a [address] password. You can change your password at any time.  5. Enter your Password Reset Question and Answer. This can be used at a later time if you forget your password.   6. Enter your e-mail address. This allows you to receive e-mail notifications when new information is available in [address].  7. Click Sign Up. You can now view your health information.    For assistance activating your [employer] account, call [phone]

## 2017-03-16 NOTE — IP AVS SNAPSHOT
3/21/2017          [address]  [facility] [address]    Dear Francesco:    [facility] wants to ensure your discharge home is safe and you or your loved ones have had all your questions answered regarding your care after you leave the hospital.    You may receive a telephone call within two days of your discharge.  This call is to make certain you understand your discharge instructions as well as ensure we provided you with the best care possible during your stay with us.     The call will only last approximately 3-5 minutes and will be done by a nurse.    Once again, we want to ensure your discharge home is safe and that you have a clear understanding of any next steps in your care.  If you have any questions or concerns, please do not hesitate to contact us, we are here for you.  Thank you for choosing [facility] for your healthcare needs.    Sincerely,    Xavier Guzman    [facility]

## 2017-03-16 NOTE — IP AVS SNAPSHOT
" Home Care Instructions                                                                                                                  Name:Francesco Schulte  Medical Record Number:[id number]  CSN: [id number]    YOB: 1950   Age: 66 y.o.  Sex: male  HT:1.905 m (6' 3\") WT: 102.3 kg (225 lb 8.5 oz)          Admit Date: 3/16/2017     Discharge Date:   Today's Date: 3/21/2017  Attending Doctor:  Bhupinder Orona M.D.                  Allergies:  Metformin and Statins            Discharge Instructions       Discharge Instructions    Discharged to home by car with relative. Discharged via walking, hospital escort: Refused.  Special equipment needed: Not Applicable    Be sure to schedule a follow-up appointment with your primary care doctor or any specialists as instructed.     Discharge Plan:   Diet Plan: Discussed  Activity Level: Discussed  Confirmed Follow up Appointment: Appointment Scheduled   Confirmed Symptoms Management: Discussed  Medication Reconciliation Updated: Yes  Influenza Vaccine Indication: Not indicated: Previously immunized this influenza season and > 8 years of age    I understand that a diet low in cholesterol, fat, and sodium is recommended for good health. Unless I have been given specific instructions below for another diet, I accept this instruction as my diet prescription.   Other diet: Heart healthy, Diabetic Diet     Special Instructions:   HF Patient Discharge Instructions  · Monitor your weight daily, and maintain a weight chart, to track your weight changes.   · Activity as tolerated, unless your Doctor has ordered otherwise. Other activity order: Activity as tolerated.  · Follow a low fat, low cholesterol, low salt diet unless instructed otherwise by your Doctor. Read the labels on the back of food products and track your intake of fat, cholesterol and salt.   · Fluid Restriction No. If a Fluid Restriction has been ordered by your Doctor, measure fluids with a measuring cup to ensure " that you are not exceeding the restriction.   · No smoking.  · Oxygen No. If your Doctor has ordered that you wear Oxygen at home, it is important to wear it as ordered.  · Did you receive an explanation from staff on the importance of taking each of your medications and why it is necessary to keep taking them unless your doctor says to stop? Yes  · Were all of your questions answered about how to manage your heart failure and what to do if you have increased signs and symptoms after you go home? Yes  · Do you feel like your heart failure care team involved you in the care treatment plan and allowed you to make decisions regarding your care while in the hospital and addressed any discharge needs you might have? Yes    See the educational handout provided at discharge for more information on monitoring your daily weight, activity and diet. This also explains more about Heart Failure, symptoms of a flare-up and some of the tests that you have undergone.     Warning Signs of a Flare-Up include:  · Swelling in the ankles or lower legs.  · Shortness of breath, while at rest, or while doing normal activities.   · Shortness of breath at night when in bed, or coughing in bed.   · Requiring more pillows to sleep at night, or needing to sit up at night to sleep.  · Feeling weak, dizzy or fatigued.     When to call your Doctor:  · [facility] about questions regarding the discharge instructions you were given [phone].  · Call your Primary Care Physician or Cardiologist if:   1. You experience any pain radiating to your jaw or neck.  2. You have any difficulty breathing.  3. You experience weight gain of 3 lbs in a day or 5 lbs in a week.   4. You feel any palpitations or irregular heartbeats.  5. You become dizzy or lose consciousness.   If you have had an angiogram or had a pacemaker or AICD placed, and experience:  1. Bleeding, drainage or swelling at the surgical / puncture site.  2. Fever greater  than 100.0 F  3. Shock from internal defibrillator.  4. Cool and / or numb extremities.      · Is patient discharged on Warfarin / Coumadin?   No     · Is patient Post Blood Transfusion?  No    Depression / Suicide Risk    As you are discharged from this [facility] facility, it is important to learn how to keep safe from harming yourself.    Recognize the warning signs:  · Abrupt changes in personality, positive or negative- including increase in energy   · Giving away possessions  · Change in eating patterns- significant weight changes-  positive or negative  · Change in sleeping patterns- unable to sleep or sleeping all the time   · Unwillingness or inability to communicate  · Depression  · Unusual sadness, discouragement and loneliness  · Talk of wanting to die  · Neglect of personal appearance   · Rebelliousness- reckless behavior  · Withdrawal from people/activities they love  · Confusion- inability to concentrate     If you or a loved one observes any of these behaviors or has concerns about self-harm, here's what you can do:  · Talk about it- your feelings and reasons for harming yourself  · Remove any means that you might use to hurt yourself (examples: pills, rope, extension cords, firearm)  · Get professional help from the community (Mental Health, Substance Abuse, psychological counseling)  · Do not be alone:Call your Safe Contact- someone whom you trust who will be there for you.  · Call your local CRISIS HOTLINE 702-5219 or 605-214-3039  · Call your local Children's Mobile Crisis Response Team [place] (555) 396-0108 or www.Fluther  · Call the toll free National Suicide Prevention Hotlines   · National Suicide Prevention Lifeline 795-327-YPLM (2747)  · National Hope Line Network 800-SUICIDE (661-8687)      Metoprolol tablets  What is this medicine?  METOPROLOL (me TOE proe lole) is a beta-blocker. Beta-blockers reduce the workload on the heart and help it to beat more regularly. This  medicine is used to treat high blood pressure and to prevent chest pain. It is also used to after a heart attack and to prevent an additional heart attack from occurring.  This medicine may be used for other purposes; ask your health care provider or pharmacist if you have questions.  COMMON BRAND NAME(S): Lopressor  What should I tell my health care provider before I take this medicine?  They need to know if you have any of these conditions:  -diabetes  -heart or vessel disease like slow heart rate, worsening heart failure, heart block, sick sinus syndrome or Raynaud's disease  -kidney disease  -liver disease  -lung or breathing disease, like asthma or emphysema  -pheochromocytoma  -thyroid disease  -an unusual or allergic reaction to metoprolol, other beta-blockers, medicines, foods, dyes, or preservatives  -pregnant or trying to get pregnant  -breast-feeding  How should I use this medicine?  Take this medicine by mouth with a drink of water. Follow the directions on the prescription label. Take this medicine immediately after meals. Take your doses at regular intervals. Do not take more medicine than directed. Do not stop taking this medicine suddenly. This could lead to serious heart-related effects.  Talk to your pediatrician regarding the use of this medicine in children. Special care may be needed.  Overdosage: If you think you have taken too much of this medicine contact a poison control center or emergency room at once.  NOTE: This medicine is only for you. Do not share this medicine with others.  What if I miss a dose?  If you miss a dose, take it as soon as you can. If it is almost time for your next dose, take only that dose. Do not take double or extra doses.  What may interact with this medicine?  Do not take this medicine with any of the following medications:  -sotalol  This medicine may also interact with the following  medications:  -clonidine  -digoxin  -dobutamine  -epinephrine  -isoproterenol  -medicine to control heart rhythm like quinidine, propafenone  -medicine for depression like monoamine oxidase (MAO) inhibitors, fluoxetine, and paroxetine  -medicine for high blood pressure like calcium channel blockers  -reserpine  This list may not describe all possible interactions. Give your health care provider a list of all the medicines, herbs, non-prescription drugs, or dietary supplements you use. Also tell them if you smoke, drink alcohol, or use illegal drugs. Some items may interact with your medicine.  What should I watch for while using this medicine?  Visit your doctor or health care professional for regular check ups. Contact your doctor right away if your symptoms worsen. Check your blood pressure and pulse rate regularly. Ask your health care professional what your blood pressure and pulse rate should be, and when you should contact them.  You may get drowsy or dizzy. Do not drive, use machinery, or do anything that needs mental alertness until you know how this medicine affects you. Do not sit or stand up quickly, especially if you are an older patient. This reduces the risk of dizzy or fainting spells. Contact your doctor if these symptoms continue. Alcohol may interfere with the effect of this medicine. Avoid alcoholic drinks.  What side effects may I notice from receiving this medicine?  Side effects that you should report to your doctor or health care professional as soon as possible:  -allergic reactions like skin rash, itching or hives  -cold or numb hands or feet  -depression  -difficulty breathing  -faint  -fever with sore throat  -irregular heartbeat, chest pain  -rapid weight gain  -swollen legs or ankles  Side effects that usually do not require medical attention (report to your doctor or health care professional if they continue or are bothersome):  -anxiety or nervousness  -change in sex drive or  performance  -dry skin  -headache  -nightmares or trouble sleeping  -short term memory loss  -stomach upset or diarrhea  -unusually tired  This list may not describe all possible side effects. Call your doctor for medical advice about side effects. You may report side effects to FDA at 1-100-QUK-1446.  Where should I keep my medicine?  Keep out of the reach of children.  Store at room temperature between 15 and 30 degrees C (59 and 86 degrees F). Throw away any unused medicine after the expiration date.  NOTE: This sheet is a summary. It may not cover all possible information. If you have questions about this medicine, talk to your doctor, pharmacist, or health care provider.  © 2014, [employer]/Gold Standard. (2/27/2009 4:11:19 PM)        Your appointments     Mar 24, 2017  4:30 PM   HOSPITAL FOLLOW UP with Neptali Watkins M.D.   [facility] (--)    [address], [address]  [place] [id number] [phone]              Follow-up Information     1. Follow up with Primary Care MD  In 1 week.        2. Follow up with Becka Flores M.D..    Specialty:  Oncology    Why:  Office will call with appointment date and time    Contact information    [address]  [address]  [place] [id number] [phone]           Discharge Medication Instructions:    Below are the medications your physician expects you to take upon discharge:    Review all your home medications and newly ordered medications with your doctor and/or pharmacist. Follow medication instructions as directed by your doctor and/or pharmacist.    Please keep your medication list with you and share with your physician.               Medication List      START taking these medications        Instructions    famotidine 20 MG Tabs   Last time this was given:  20 mg on 3/19/2017  1:03 AM   Commonly known as:  PEPCID    Take 1 Tab by mouth as needed (after hydrea).   Dose:  20 mg       hydrocodone-acetaminophen 5-325  MG Tabs per tablet   Last time this was given:  2 Tabs on 3/18/2017 10:00 AM   Commonly known as:  NORCO    Take 1-2 Tabs by mouth every 6 hours as needed.   Dose:  1-2 Tab       * metoprolol 25 MG Tabs   Last time this was given:  25 mg on 3/21/2017  9:04 AM   Commonly known as:  LOPRESSOR   Next Dose Due:  3/22/17 at 9am     Take 1 Tab by mouth every morning.   Dose:  25 mg       * metoprolol 25 MG Tabs   Last time this was given:  25 mg on 3/21/2017  9:04 AM   Commonly known as:  LOPRESSOR   Next Dose Due:  3/21/17 at 9pm    Notes to Patient:  Only take half a tab, for 12.5 mg     Take 0.5 Tabs by mouth every evening.   Dose:  12.5 mg       oxybutynin 5 MG Tabs   Last time this was given:  5 mg on 3/19/2017 10:17 AM   Commonly known as:  DITROPAN    Take 1 Tab by mouth 3 times a day as needed (bladder spasm).   Dose:  5 mg       * Notice:  This list has 2 medication(s) that are the same as other medications prescribed for you. Read the directions carefully, and ask your doctor or other care provider to review them with you.      CHANGE how you take these medications        Instructions    hydroxyurea 500 MG Caps   What changed:  how much to take   Last time this was given:  2,000 mg on 3/20/2017  6:57 PM   Commonly known as:  HYDREA   Next Dose Due:  3/21/17 at 5pm     Take 4 Caps by mouth every day.   Dose:  2000 mg         CONTINUE taking these medications        Instructions    apixaban 5mg Tabs   Last time this was given:  5 mg on 3/21/2017  9:03 AM   Commonly known as:  ELIQUIS   Next Dose Due:  3/21/17 at 9pm     Take 1 Tab by mouth 2 Times a Day.   Dose:  5 mg       aspirin 81 MG Chew chewable tablet   Last time this was given:  81 mg on 3/21/2017  9:03 AM   Commonly known as:  ASA   Next Dose Due:  3/22/17 at 9am     Take 1 Tab by mouth every day.   Dose:  81 mg       BRILINTA 90 MG Tabs tablet   Last time this was given:  90 mg on 3/21/2017  9:04 AM   Generic drug:  ticagrelor   Next Dose Due:  3/21/17  at 9pm     Take 90 mg by mouth 2 Times a Day.   Dose:  90 mg       colesevelam 625 MG Tabs   Last time this was given:  625 mg on 3/21/2017  9:00 AM   Commonly known as:  WELCHOL   Next Dose Due:  3/21/17 at 5pm     Take 625 mg by mouth 2 times a day, with meals.   Dose:  625 mg       diphenhydrAMINE 50 MG Caps   Commonly known as:  BENADRYL    Take 50 mg by mouth every 6 hours as needed.   Dose:  50 mg       finasteride 5 MG Tabs   Last time this was given:  5 mg on 3/21/2017  9:04 AM   Commonly known as:  PROSCAR   Next Dose Due:  3/22/17 at 9am     Take 5 mg by mouth every day.   Dose:  5 mg       fludrocortisone 0.1 MG Tabs   Last time this was given:  0.1 mg on 3/18/2017 12:14 PM   Commonly known as:  FLORINEF    Take 1 Tab by mouth every morning.   Dose:  0.1 mg       glipiZIDE 10 MG Tabs   Last time this was given:  10 mg on 3/20/2017  8:42 PM   Commonly known as:  GLUCOTROL   Next Dose Due:  3/21/17 at 5pm     Take 10 mg by mouth every evening.   Dose:  10 mg       insulin glargine 100 UNIT/ML Soln   Last time this was given:  20 Units on 3/20/2017 10:26 PM   Commonly known as:  LANTUS   Next Dose Due:  3/21/17 at 9pm     Inject 20 Units as instructed every evening.   Dose:  20 Units       mupirocin calcium 2 % Crea   Commonly known as:  BACTROBAN    Apply twice a day and/ or after each washing to affected area.       tamsulosin 0.4 MG capsule   Last time this was given:  0.4 mg on 3/21/2017  9:04 AM   Commonly known as:  FLOMAX   Next Dose Due:  3/21/17 at 9pm     Take 0.4 mg by mouth 2 Times a Day.   Dose:  0.4 mg         STOP taking these medications     metoprolol SR 25 MG Tb24   Commonly known as:  TOPROL XL       sulfamethoxazole-trimethoprim 800-160 MG tablet   Commonly known as:  BACTRIM DS               Instructions           Diet / Nutrition:    Follow any diet instructions given to you by your doctor or the dietician, including how much salt (sodium) you are allowed each day.    If you are  overweight, talk to your doctor about a weight reduction plan.    Activity:    Remain physically active following your doctor's instructions about exercise and activity.    Rest often.     Any time you become even a little tired or short of breath, SIT DOWN and rest.    Worsening Symptoms:    Report any of the following signs and symptoms to the doctor's office immediately:    *Pain of jaw, arm, or neck  *Chest pain not relieved by medication                               *Dizziness or loss of consciousness  *Difficulty breathing even when at rest   *More tired than usual                                       *Bleeding drainage or swelling of surgical site  *Swelling of feet, ankles, legs or stomach                 *Fever (>100ºF)  *Pink or blood tinged sputum  *Weight gain (3lbs/day or 5lbs /week)           *Shock from internal defibrillator (if applicable)  *Palpitations or irregular heartbeats                *Cool and/or numb extremities    Stroke Awareness    Common Risk Factors for Stroke include:    Age  Atrial Fibrillation  Carotid Artery Stenosis  Diabetes Mellitus  Excessive alcohol consumption  High blood pressure  Overweight   Physical inactivity  Smoking    Warning signs and symptoms of a stroke include:    *Sudden numbness or weakness of the face, arm or leg (especially on one side of the body).  *Sudden confusion, trouble speaking or understanding.  *Sudden trouble seeing in one or both eyes.  *Sudden trouble walking, dizziness, loss of balance or coordination.Sudden severe headache with no known cause.    It is very important to get treatment quickly when a stroke occurs. If you experience any of the above warning signs, call 911 immediately.                   Disclaimer         Quit Smoking / Tobacco Use:    I understand the use of any tobacco products increases my chance of suffering from future heart disease or stroke and could cause other illnesses which may shorten my life. Quitting the use of  tobacco products is the single most important thing I can do to improve my health. For further information on smoking / tobacco cessation call a Toll Free Quit Line at 1-640.574.5296 (*National [employer] [place]) or 1-859.526.4104 (American Lung Association) or you can access the web based program at www.lungusa.org.    [place] Tobacco Users Help Line:  (735) 873-9305       Toll Free: 1-411.124.1166  Quit Tobacco Program [facility] (200)482-8705    Crisis Hotline:    [place] Crisis Hotline:  0-329-BTRYXFB or 1-513.825.8511    [place] Crisis Hotline:    1-937.470.6870 or 512-415-3604    Discharge Survey:   Thank you for choosing [facility]. We hope we did everything we could to make your hospital stay a pleasant one. You may be receiving a phone survey and we would appreciate your time and participation in answering the questions. Your input is very valuable to us in our efforts to improve our service to our patients and their families.        My signature on this form indicates that:    1. I have reviewed and understand the above information.  2. My questions regarding this information have been answered to my satisfaction.  3. I have formulated a plan with my discharge nurse to obtain my prescribed medications for home.                  Disclaimer         __________________________________                     __________       ________                       Patient Signature                                                 Date                    Time

## 2017-03-16 NOTE — ED NOTES
Chief Complaint   Patient presents with   • Weakness   • Body Aches   • Headache     Patient bib family, apparently was here for days ago with MI and stent placement. Today he presents very tired, slow to respond, c/o severe joint and body aches. Patient flushed. States he is unsure if he was prescribed a statin, but states statin allergy. Charge RN aware, EKG completed. Patient to be transferred to [place] [address].

## 2017-03-16 NOTE — IP AVS SNAPSHOT
" <p align=\"LEFT\"><IMG SRC=\"//[email or web address]\" alt=\"Image\" WIDTH=\"50%\" HEIGHT=\"200\" BORDER=\"\"></p>                   Name:Francesco Schulte  Medical Record Number:[id number]  CSN: [id number]    YOB: 1950   Age: 66 y.o.  Sex: male  HT:1.905 m (6' 3\") WT: 102.3 kg (225 lb 8.5 oz)          Admit Date: 3/16/2017     Discharge Date:   Today's Date: 3/21/2017  Attending Doctor:  Bhupinder Orona M.D.                  Allergies:  Metformin and Statins          Your appointments     Mar 24, 2017  4:30 PM   HOSPITAL FOLLOW UP with Neptali Watkins M.D.   [facility] (--)    [address], [address]  [facility] [id number] [phone]              Follow-up Information     1. Follow up with Primary Care MD  In 1 week.        2. Follow up with Becka Flores M.D..    Specialty:  Oncology    Why:  Office will call with appointment date and time    Contact information    [address]  [address]  [facility] [id number] [phone]           Medication List      Take these Medications        Instructions    apixaban 5mg Tabs   Commonly known as:  ELIQUIS    Take 1 Tab by mouth 2 Times a Day.   Dose:  5 mg       aspirin 81 MG Chew chewable tablet   Commonly known as:  ASA    Take 1 Tab by mouth every day.   Dose:  81 mg       BRILINTA 90 MG Tabs tablet   Generic drug:  ticagrelor    Take 90 mg by mouth 2 Times a Day.   Dose:  90 mg       colesevelam 625 MG Tabs   Commonly known as:  WELCHOL    Take 625 mg by mouth 2 times a day, with meals.   Dose:  625 mg       diphenhydrAMINE 50 MG Caps   Commonly known as:  BENADRYL    Take 50 mg by mouth every 6 hours as needed.   Dose:  50 mg       famotidine 20 MG Tabs   Commonly known as:  PEPCID    Take 1 Tab by mouth as needed (after hydrea).   Dose:  20 mg       finasteride 5 MG Tabs   Commonly known as:  PROSCAR    Take 5 mg by mouth every day.   Dose:  5 mg       fludrocortisone 0.1 MG Tabs   Commonly known as:  FLORINEF   " Take 1 Tab by mouth every morning.   Dose:  0.1 mg       glipiZIDE 10 MG Tabs   Commonly known as:  GLUCOTROL    Take 10 mg by mouth every evening.   Dose:  10 mg       hydrocodone-acetaminophen 5-325 MG Tabs per tablet   Commonly known as:  NORCO    Take 1-2 Tabs by mouth every 6 hours as needed.   Dose:  1-2 Tab       hydroxyurea 500 MG Caps   What changed:  how much to take   Commonly known as:  HYDREA    Take 4 Caps by mouth every day.   Dose:  2000 mg       insulin glargine 100 UNIT/ML Soln   Commonly known as:  LANTUS    Inject 20 Units as instructed every evening.   Dose:  20 Units       * metoprolol 25 MG Tabs   Commonly known as:  LOPRESSOR    Take 1 Tab by mouth every morning.   Dose:  25 mg       * metoprolol 25 MG Tabs   Commonly known as:  LOPRESSOR   Notes to Patient:  Only take half a tab, for 12.5 mg     Take 0.5 Tabs by mouth every evening.   Dose:  12.5 mg       mupirocin calcium 2 % Crea   Commonly known as:  BACTROBAN    Apply twice a day and/ or after each washing to affected area.       oxybutynin 5 MG Tabs   Commonly known as:  DITROPAN    Take 1 Tab by mouth 3 times a day as needed (bladder spasm).   Dose:  5 mg       tamsulosin 0.4 MG capsule   Commonly known as:  FLOMAX    Take 0.4 mg by mouth 2 Times a Day.   Dose:  0.4 mg       * Notice:  This list has 2 medication(s) that are the same as other medications prescribed for you. Read the directions carefully, and ask your doctor or other care provider to review them with you.

## 2017-03-17 ENCOUNTER — APPOINTMENT (OUTPATIENT)
Dept: RADIOLOGY | Facility: MEDICAL CENTER | Age: 67
DRG: 815 | End: 2017-03-17
Attending: INTERNAL MEDICINE
Payer: COMMERCIAL

## 2017-03-17 ENCOUNTER — APPOINTMENT (OUTPATIENT)
Dept: RADIOLOGY | Facility: MEDICAL CENTER | Age: 67
DRG: 815 | End: 2017-03-17
Attending: NURSE PRACTITIONER
Payer: COMMERCIAL

## 2017-03-17 PROBLEM — I25.2 HISTORY OF ST ELEVATION MYOCARDIAL INFARCTION (STEMI): Status: ACTIVE | Noted: 2017-03-17

## 2017-03-17 LAB
BNP SERPL-MCNC: 103 PG/ML (ref 0–100)
EKG IMPRESSION: NORMAL
ERYTHROCYTE [DISTWIDTH] IN BLOOD BY AUTOMATED COUNT: 44.3 FL (ref 35.9–50)
ERYTHROCYTE [DISTWIDTH] IN BLOOD BY AUTOMATED COUNT: 44.7 FL (ref 35.9–50)
ERYTHROCYTE [DISTWIDTH] IN BLOOD BY AUTOMATED COUNT: 44.7 FL (ref 35.9–50)
GLUCOSE BLD-MCNC: 138 MG/DL (ref 65–99)
GLUCOSE BLD-MCNC: 143 MG/DL (ref 65–99)
GLUCOSE BLD-MCNC: 169 MG/DL (ref 65–99)
GLUCOSE BLD-MCNC: 65 MG/DL (ref 65–99)
GLUCOSE BLD-MCNC: 98 MG/DL (ref 65–99)
HCT VFR BLD AUTO: 35.3 % (ref 42–52)
HCT VFR BLD AUTO: 37.1 % (ref 42–52)
HCT VFR BLD AUTO: 37.8 % (ref 42–52)
HGB BLD-MCNC: 10.1 G/DL (ref 14–18)
HGB BLD-MCNC: 10.5 G/DL (ref 14–18)
HGB BLD-MCNC: 11 G/DL (ref 14–18)
MCH RBC QN AUTO: 18.3 PG (ref 27–33)
MCH RBC QN AUTO: 18.6 PG (ref 27–33)
MCH RBC QN AUTO: 18.8 PG (ref 27–33)
MCHC RBC AUTO-ENTMCNC: 28.3 G/DL (ref 33.7–35.3)
MCHC RBC AUTO-ENTMCNC: 28.6 G/DL (ref 33.7–35.3)
MCHC RBC AUTO-ENTMCNC: 29.1 G/DL (ref 33.7–35.3)
MCV RBC AUTO: 64.6 FL (ref 81.4–97.8)
MCV RBC AUTO: 64.7 FL (ref 81.4–97.8)
MCV RBC AUTO: 65 FL (ref 81.4–97.8)
PLATELET # BLD AUTO: 1007 K/UL (ref 164–446)
PLATELET # BLD AUTO: 878 K/UL (ref 164–446)
PLATELET # BLD AUTO: 940 K/UL (ref 164–446)
PMV BLD AUTO: 9.6 FL (ref 9–12.9)
PMV BLD AUTO: 9.7 FL (ref 9–12.9)
PMV BLD AUTO: 9.7 FL (ref 9–12.9)
RBC # BLD AUTO: 5.43 M/UL (ref 4.7–6.1)
RBC # BLD AUTO: 5.73 M/UL (ref 4.7–6.1)
RBC # BLD AUTO: 5.85 M/UL (ref 4.7–6.1)
TROPONIN I SERPL-MCNC: 2.67 NG/ML (ref 0–0.04)
TROPONIN I SERPL-MCNC: 3.45 NG/ML (ref 0–0.04)
WBC # BLD AUTO: 13.6 K/UL (ref 4.8–10.8)
WBC # BLD AUTO: 14.6 K/UL (ref 4.8–10.8)
WBC # BLD AUTO: 14.9 K/UL (ref 4.8–10.8)

## 2017-03-17 PROCEDURE — 82962 GLUCOSE BLOOD TEST: CPT | Mod: 91

## 2017-03-17 PROCEDURE — 85245 CLOT FACTOR VIII VW RISTOCTN: CPT

## 2017-03-17 PROCEDURE — 84484 ASSAY OF TROPONIN QUANT: CPT

## 2017-03-17 PROCEDURE — 93010 ELECTROCARDIOGRAM REPORT: CPT | Performed by: INTERNAL MEDICINE

## 2017-03-17 PROCEDURE — 700105 HCHG RX REV CODE 258: Performed by: INTERNAL MEDICINE

## 2017-03-17 PROCEDURE — A9270 NON-COVERED ITEM OR SERVICE: HCPCS | Performed by: HOSPITALIST

## 2017-03-17 PROCEDURE — 36415 COLL VENOUS BLD VENIPUNCTURE: CPT

## 2017-03-17 PROCEDURE — 93005 ELECTROCARDIOGRAM TRACING: CPT | Performed by: NURSE PRACTITIONER

## 2017-03-17 PROCEDURE — A9270 NON-COVERED ITEM OR SERVICE: HCPCS | Performed by: INTERNAL MEDICINE

## 2017-03-17 PROCEDURE — 83880 ASSAY OF NATRIURETIC PEPTIDE: CPT

## 2017-03-17 PROCEDURE — 700105 HCHG RX REV CODE 258: Performed by: HOSPITALIST

## 2017-03-17 PROCEDURE — 700102 HCHG RX REV CODE 250 W/ 637 OVERRIDE(OP): Performed by: INTERNAL MEDICINE

## 2017-03-17 PROCEDURE — 94760 N-INVAS EAR/PLS OXIMETRY 1: CPT

## 2017-03-17 PROCEDURE — 99233 SBSQ HOSP IP/OBS HIGH 50: CPT | Performed by: INTERNAL MEDICINE

## 2017-03-17 PROCEDURE — 85027 COMPLETE CBC AUTOMATED: CPT | Mod: 91

## 2017-03-17 PROCEDURE — 700111 HCHG RX REV CODE 636 W/ 250 OVERRIDE (IP): Performed by: HOSPITALIST

## 2017-03-17 PROCEDURE — 71010 DX-CHEST-PORTABLE (1 VIEW): CPT

## 2017-03-17 PROCEDURE — 770020 HCHG ROOM/CARE - TELE (206)

## 2017-03-17 PROCEDURE — 700102 HCHG RX REV CODE 250 W/ 637 OVERRIDE(OP): Performed by: HOSPITALIST

## 2017-03-17 PROCEDURE — 71010 DX-CHEST-LIMITED (1 VIEW): CPT

## 2017-03-17 RX ORDER — FAMOTIDINE 20 MG/1
20 TABLET, FILM COATED ORAL PRN
Status: DISCONTINUED | OUTPATIENT
Start: 2017-03-17 | End: 2017-03-21 | Stop reason: HOSPADM

## 2017-03-17 RX ORDER — PRASUGREL 10 MG/1
60 TABLET, FILM COATED ORAL ONCE
Status: COMPLETED | OUTPATIENT
Start: 2017-03-17 | End: 2017-03-17

## 2017-03-17 RX ORDER — PRASUGREL 10 MG/1
10 TABLET, FILM COATED ORAL DAILY
Status: DISCONTINUED | OUTPATIENT
Start: 2017-03-18 | End: 2017-03-19

## 2017-03-17 RX ADMIN — APIXABAN 5 MG: 5 TABLET, FILM COATED ORAL at 21:26

## 2017-03-17 RX ADMIN — FINASTERIDE 5 MG: 5 TABLET, FILM COATED ORAL at 10:40

## 2017-03-17 RX ADMIN — PRASUGREL HYDROCHLORIDE 60 MG: 10 TABLET, FILM COATED ORAL at 17:47

## 2017-03-17 RX ADMIN — COLESEVELAM HYDROCHLORIDE 625 MG: 625 TABLET, FILM COATED ORAL at 16:30

## 2017-03-17 RX ADMIN — TAMSULOSIN HYDROCHLORIDE 0.4 MG: 0.4 CAPSULE ORAL at 10:40

## 2017-03-17 RX ADMIN — GLIPIZIDE 10 MG: 5 TABLET ORAL at 21:21

## 2017-03-17 RX ADMIN — TAMSULOSIN HYDROCHLORIDE 0.4 MG: 0.4 CAPSULE ORAL at 21:17

## 2017-03-17 RX ADMIN — OXYCODONE HYDROCHLORIDE 5 MG: 5 TABLET ORAL at 05:15

## 2017-03-17 RX ADMIN — TICAGRELOR 90 MG: 90 TABLET ORAL at 10:40

## 2017-03-17 RX ADMIN — ASPIRIN 81 MG: 81 TABLET, CHEWABLE ORAL at 10:40

## 2017-03-17 RX ADMIN — FAMOTIDINE 20 MG: 20 TABLET, FILM COATED ORAL at 17:50

## 2017-03-17 RX ADMIN — COLESEVELAM HYDROCHLORIDE 625 MG: 625 TABLET, FILM COATED ORAL at 10:40

## 2017-03-17 RX ADMIN — SODIUM CHLORIDE: 9 INJECTION, SOLUTION INTRAVENOUS at 21:33

## 2017-03-17 RX ADMIN — CEFTRIAXONE 2 G: 2 INJECTION, POWDER, FOR SOLUTION INTRAMUSCULAR; INTRAVENOUS at 21:30

## 2017-03-17 RX ADMIN — SODIUM CHLORIDE: 9 INJECTION, SOLUTION INTRAVENOUS at 01:49

## 2017-03-17 RX ADMIN — APIXABAN 5 MG: 5 TABLET, FILM COATED ORAL at 10:40

## 2017-03-17 RX ADMIN — FLUDROCORTISONE ACETATE 0.1 MG: 0.1 TABLET ORAL at 10:40

## 2017-03-17 RX ADMIN — HYDROXYUREA 2000 MG: 500 CAPSULE ORAL at 16:30

## 2017-03-17 RX ADMIN — STANDARDIZED SENNA CONCENTRATE AND DOCUSATE SODIUM 2 TABLET: 8.6; 5 TABLET, FILM COATED ORAL at 21:22

## 2017-03-17 ASSESSMENT — PAIN SCALES - GENERAL
PAINLEVEL_OUTOF10: 3
PAINLEVEL_OUTOF10: 0
PAINLEVEL_OUTOF10: 7

## 2017-03-17 ASSESSMENT — COPD QUESTIONNAIRES
DO YOU EVER COUGH UP ANY MUCUS OR PHLEGM?: NO/ONLY WITH OCCASIONAL COLDS OR INFECTIONS
HAVE YOU SMOKED AT LEAST 100 CIGARETTES IN YOUR ENTIRE LIFE: NO/DON'T KNOW
COPD SCREENING SCORE: 2
DURING THE PAST 4 WEEKS HOW MUCH DID YOU FEEL SHORT OF BREATH: NONE/LITTLE OF THE TIME

## 2017-03-17 ASSESSMENT — LIFESTYLE VARIABLES: EVER_SMOKED: NEVER

## 2017-03-17 NOTE — RESPIRATORY CARE
Respiratory Rapid Response Note    Symptoms SOB     Breath Sounds  RUL Breath Sounds: Diminished (03/17/17 0537)  RML Breath Sounds: Diminished (03/17/17 0537)  RLL Breath Sounds: Diminished (03/17/17 0537)  EVELINA Breath Sounds: Diminished (03/17/17 0537)  LLL Breath Sounds: Diminished (03/17/17 0537)     ABG Results    O2 (FiO2): 21 (03/17/17 0537)  O2 (LPM): 0 (03/17/17 0537)  O2 Daily Delivery Respiratory : Room Air with O2 Available (03/17/17 0537)    Events/Summary/Plan: RRT SOB (03/17/17 0537)

## 2017-03-17 NOTE — PROGRESS NOTES
Dr. Sims critical platelet of 1007 at 1555, orders given to give hydrea now and to redraw CBC at 2100.

## 2017-03-17 NOTE — ED PROVIDER NOTES
ED Provider Note    CHIEF COMPLAINT  Chief Complaint   Patient presents with   • Weakness   • Body Aches   • Headache       HPI  Francesco Schulte is a 66 y.o. male with a history of diabetes mellitus, psychosis, CVA, coronary artery disease, status post multiple stent placements, with recent admission earlier this month for a STEMI with subsequent placement of stents ×2, on chronic anticoagulation with Eliquis who presents with complaints of fatigue, dizziness, HA and joint aches. Headache is right sided, sharp, throbbing in nature with associated photophobia but no nausea or vomiting. He reported that his HA is different from his migraine HA. Symptoms started last night and worsening since then. Patient accompanied by family that reported tachycardia and hypotension. Also reported chills, generalized weakness and numbness affecting right hand.     REVIEW OF SYSTEMS  See HPI for further details. All other systems are negative.     PAST MEDICAL HISTORY  Past Medical History   Diagnosis Date   • Diabetes (CMS-HCC)    • Blood clotting disorder (CMS-HCC)    [place]        FAMILY HISTORY  History reviewed. No pertinent family history.    SOCIAL HISTORY  Social History     Social History   • Marital Status:      Spouse Name: N/A   • Number of Children: N/A   • Years of Education: N/A     Social History Main Topics   • Smoking status: Former Smoker   • Smokeless tobacco: None   • Alcohol Use: No   • Drug Use: No   • Sexual Activity: Not Asked     Other Topics Concern   • None     Social History Narrative       SURGICAL HISTORY  Past Surgical History   Procedure Laterality Date   • Other cardiac surgery       stents x 3   • Cath placement       right arm       CURRENT MEDICATIONS     Medication List            Instructions    apixaban 5mg Tabs   Commonly known as:  ELIQUIS    Take 1 Tab by mouth 2 Times a Day.   Dose:  5 mg       aspirin 81 MG Chew chewable tablet   Commonly known as:  ASA    Take 1 Tab by  "mouth every day.   Dose:  81 mg       atorvastatin 40 MG Tabs   Commonly known as:  LIPITOR    Take 1 Tab by mouth every bedtime.   Dose:  40 mg       * BRILINTA 90 MG Tabs tablet   Generic drug:  ticagrelor    Take 90 mg by mouth 2 Times a Day.   Dose:  90 mg       * ticagrelor 90 MG Tabs tablet   Commonly known as:  BRILINTA    Take 1 Tab by mouth 2 Times a Day.   Dose:  90 mg       CIALIS 5 MG tablet   Generic drug:  tadalafil    Take 5 mg by mouth every day.   Dose:  5 mg       colesevelam 625 MG Tabs   Commonly known as:  WELCHOL    Take 625 mg by mouth 2 times a day, with meals.   Dose:  625 mg       diphenhydrAMINE 50 MG Caps   Commonly known as:  BENADRYL    Take 50 mg by mouth every 6 hours as needed.   Dose:  50 mg       finasteride 5 MG Tabs   Commonly known as:  PROSCAR    Take 5 mg by mouth every day.   Dose:  5 mg       fludrocortisone 0.1 MG Tabs   Commonly known as:  FLORINEF    Take 1 Tab by mouth every morning.   Dose:  0.1 mg       glipiZIDE 10 MG Tabs   Commonly known as:  GLUCOTROL    Take 10 mg by mouth every evening.   Dose:  10 mg       hydroxyurea 500 MG Caps   Commonly known as:  HYDREA    Take 3 Caps by mouth every day.   Dose:  1500 mg       insulin glargine 100 UNIT/ML Soln   Commonly known as:  LANTUS    Inject 20 Units as instructed every evening.   Dose:  20 Units       metoprolol SR 25 MG Tb24   Commonly known as:  TOPROL XL    Take 1 Tab by mouth every day.   Dose:  25 mg       mupirocin calcium 2 % Crea   Commonly known as:  BACTROBAN    Apply twice a day and/ or after each washing to affected area.       sulfamethoxazole-trimethoprim 800-160 MG tablet   Commonly known as:  BACTRIM DS    Take 1 Tab by mouth every 12 hours. Stay hydrated   Dose:  1 Tab       tamsulosin 0.4 MG capsule   Commonly known as:  FLOMAX    Take 0.4 mg by mouth 2 Times a Day.   Dose:  0.4 mg          ALLERGIES  Allergies   Allergen Reactions   • Metformin Unspecified     \"Similar to a heart attack, I was " "hospitalized\"   • Statins [Hmg-Coa-R Inhibitors]      Muscle ache, joint pain       PHYSICAL EXAM  VITAL SIGNS: BP 97/56 mmHg  Pulse 87  Temp(Src) 36.6 °C (97.9 °F)  Resp 20  Ht 1.905 m (6' 3\")  Wt 95.255 kg (210 lb)  BMI 26.25 kg/m2  SpO2 98%  Constitutional: drowsy, in no acute distress. Ill appearing  HENT: Atraumatic. Bilateral external ears normal, mucous membranes moist, throat nonerythematous without exudates, nose is normal. Tenderness to palpation at right temporal area with no palpable cords.   Eyes: PERRL, EOMI, conjunctiva moist, noninjected.  Neck: Nontender, Normal range of motion, No nuchal rigidity, No stridor.   Lymphatic: No lymphadenopathy noted.   Cardiovascular: Regular rate and rhythm, no murmurs, rubs, gallops.  Thorax & Lungs:  Good breath sounds bilaterally, no wheezes, rales, or retractions.  No chest tenderness.  Abdomen: Bowel sounds normal, Soft, nontender, nondistended, no rebound, guarding, masses.  Back: No CVA or spinal tenderness.  Extremities: Intact distal pulses, No edema, No tenderness.   Skin: Warm, Dry, No rashes.   Musculoskeletal: generalized joint pain.  Neurologic: Alert & oriented x 3, sensory and motor function normal. No focal deficits.   Psychiatric: Affect normal, Judgment normal, Mood normal.     EKG  Normal Sinus rhythm. Old anteroseptal infarct. Non-specific ST-T wave changes.        PULSE OXIMETRY:    98 % on room air    RADIOLOGY/PROCEDURES      COURSE & MEDICAL DECISION MAKING  Pertinent Labs & Imaging studies reviewed. WBC 19.9, hemoglobin 12.4, hematocrit 42.5, MCV 64.1, platelets 1011, ESR 8, sodium 133, potassium 3.8, glucose 118, CRP 35.5, CPK 21, lactic acid 3.9, troponin 2.66, , INR 1.76. Chest x-ray showed no acute abnormalities. Head CT scan showed no acute intracranial abnormalities. Blood cultures obtained. Patient is showing SIRS but no obvious source at this point. Patient treated with IV fluids per sepsis protocol, " empiric antibiotics, Rocephen . Blood pressure improved after IVF. Patient will be admitted for further management. Cardiology,  was Contacted and will evaluate the patient.  Patient will be admitted to hospitalist service, Dr. Dukes in guarded condition.    FINAL IMPRESSION  1. Sepsis with unknown source   2. Lactic acidosis  3. Thrombocytosis           Electronically signed by: Jm Perez, 3/16/2017 5:07 PM

## 2017-03-17 NOTE — CONSULTS
Reason of Consult: NSTEMI    Consulting Physician: Dr. Dukes    HPI:  66M with PV and thrombocytosis with recent acute stent thrombosis of LAD and resultant STEMI s/p PCI with 4.5b3xbnLCX OSWALD and 2.5x28mm midLAD OSWALD and LVEF 45% (chronic) with PAF on Elequis and DM. Presents with myalgias, weakness and headach and was diagnosed with SIRS with elevated lactate and CRP. Administered fluids and labs note an elevated troponin down from prior with a slight bump but overall downward trend. No chest pain. SR currently. Has not missed his medications.     Past Medical History   Diagnosis Date   • Diabetes (CMS-HCC)    • Blood clotting disorder (CMS-HCC)    • Stroke (CMS-HCC)        Past Surgical History   Procedure Laterality Date   • Other cardiac surgery       stents x 3   • Cath placement       right arm       No current facility-administered medications on file prior to encounter.     Current Outpatient Prescriptions on File Prior to Encounter   Medication Sig Dispense Refill   • hydroxyurea (HYDREA) 500 MG Cap Take 3 Caps by mouth every day. 30 Cap 0   • aspirin (ASA) 81 MG Chew Tab chewable tablet Take 1 Tab by mouth every day. 100 Tab 0   • fludrocortisone (FLORINEF) 0.1 MG Tab Take 1 Tab by mouth every morning. 30 Tab 0   • diphenhydrAMINE (BENADRYL) 50 MG Cap Take 50 mg by mouth every 6 hours as needed.     • insulin glargine (LANTUS) 100 UNIT/ML Solution Inject 20 Units as instructed every evening.     • mupirocin calcium (BACTROBAN) 2 % Cream Apply twice a day and/ or after each washing to affected area. 1 Tube 0   • metoprolol SR (TOPROL XL) 25 MG TABLET SR 24 HR Take 1 Tab by mouth every day. 30 Tab 2   • apixaban (ELIQUIS) 5mg Tab Take 1 Tab by mouth 2 Times a Day. 60 Tab 1   • ticagrelor (BRILINTA) 90 MG Tab tablet Take 90 mg by mouth 2 Times a Day.     • tamsulosin (FLOMAX) 0.4 MG capsule Take 0.4 mg by mouth 2 Times a Day.     • finasteride (PROSCAR) 5 MG Tab Take 5 mg by mouth every day.     • colesevelam  (WELCHOL) 625 MG Tab Take 625 mg by mouth 2 times a day, with meals.     • glipiZIDE (GLUCOTROL) 10 MG Tab Take 10 mg by mouth every evening.         Current Facility-Administered Medications   Medication Dose Frequency Provider Last Rate Last Dose   • apixaban (ELIQUIS) tablet 5 mg  5 mg BID Kody Dukes M.D.   5 mg at 03/17/17 1040   • aspirin (ASA) chewable tab 81 mg  81 mg DAILY Kody Dukes M.D.   81 mg at 03/17/17 1040   • colesevelam (WELCHOL) tablet 625 mg  625 mg BID WITH MEALS Kody Dukes M.D.   625 mg at 03/17/17 1040   • finasteride (PROSCAR) tablet 5 mg  5 mg DAILY Kody Dukes M.D.   5 mg at 03/17/17 1040   • fludrocortisone (FLORINEF) tablet 0.1 mg  0.1 mg QAM Kody Dukes M.D.   0.1 mg at 03/17/17 1040   • glipiZIDE (GLUCOTROL) tablet 10 mg  10 mg Q EVENING Kody Dukes M.D.   10 mg at 03/16/17 2156   • insulin glargine (LANTUS) injection 20 Units  20 Units Nightly Kody Dukes M.D.   Stopped at 03/16/17 2101   • tamsulosin (FLOMAX) capsule 0.4 mg  0.4 mg BID Kody Dukes M.D.   0.4 mg at 03/17/17 1040   • ticagrelor (BRILINTA) tablet 90 mg  90 mg BID Kody Dukes M.D.   90 mg at 03/17/17 1040   • senna-docusate (PERICOLACE or SENOKOT S) 8.6-50 MG per tablet 2 Tab  2 Tab BID Kody Dukes M.D.   2 Tab at 03/16/17 2214    And   • polyethylene glycol/lytes (MIRALAX) PACKET 1 Packet  1 Packet QDAY PRN Kody Dukes M.D.        And   • magnesium hydroxide (MILK OF MAGNESIA) suspension 30 mL  30 mL QDAY PRN Kody Dukes M.D.        And   • bisacodyl (DULCOLAX) suppository 10 mg  10 mg QDAY PRN Kody Dukes M.D.       • Respiratory Care per Protocol   Continuous RT Kody Dukes M.D.       • ondansetron (ZOFRAN) syringe/vial injection 4 mg  4 mg Q4HRS PRN Kody Dukes M.D.       • ondansetron (ZOFRAN ODT) dispertab 4 mg  4 mg Q4HRS PRN Kody Dukes M.D.       • promethazine (PHENERGAN) tablet 12.5-25 mg  12.5-25 mg Q4HRS PRN Kody Dukes  "M.D.       • promethazine (PHENERGAN) suppository 12.5-25 mg  12.5-25 mg Q4HRS PRN Kody Dukes M.D.       • prochlorperazine (COMPAZINE) injection 5-10 mg  5-10 mg Q4HRS PRN Kody Dukes M.D.       • insulin lispro (HUMALOG) injection 2-9 Units  2-9 Units 4X/DAY ACHS Kody Dukes M.D.   Stopped at 03/17/17 0700   • NS infusion 2,859 mL  30 mL/kg Once PRN Kody Dukes M.D.       • NS infusion   Continuous Elton Cloud M.D. 75 mL/hr at 03/17/17 0933     • NS (BOLUS) infusion 500 mL  500 mL Once PRN Kody Dukes M.D.       • cefTRIAXone (ROCEPHIN) 2 g in  mL IVPB  2 g Q24HR Kody Dukes M.D.       • acetaminophen (TYLENOL) tablet 650 mg  650 mg Q4HRS PRN Kody Dukes M.D.       • oxycodone immediate-release (ROXICODONE) tablet 5 mg  5 mg Q4HRS PRN Kody Dukes M.D.   5 mg at 03/17/17 0515   • hydroxyurea (HYDREA) capsule 2,000 mg  2,000 mg DAILY Kody Dukes M.D.   Stopped at 03/17/17 0900     Last reviewed on 3/16/2017  7:01 PM by Florian Caballero, T    Metformin and Statins    History reviewed. No pertinent family history.    ROS: As HPI other reviewed and negative     Physical Exam   Blood pressure 115/74, pulse 61, temperature 36.4 °C (97.6 °F), resp. rate 17, height 1.905 m (6' 3\"), weight 100 kg (220 lb 7.4 oz), SpO2 98 %.    Constitutional: Appears well-developed.   HENT: Normocephalic and atraumatic. No scleral icterus.   Neck: No JVD present.   Cardiovascular: Normal rate. Exam reveals no gallop and no friction rub. No murmur heard.   Pulmonary/Chest: CTAB    Abdominal: S/NT/ND BS+   Musculoskeletal:  Pulses present. No atrophy. Strength normal.  Extremities: Exhibits no edema. No clubbing or cyanosis.   Skin: Skin is warm and dry.   Neuro: Non-focal, CN 2-12 intact grossly      Intake/Output Summary (Last 24 hours) at 03/17/17 1232  Last data filed at 03/17/17 0800   Gross per 24 hour   Intake    480 ml   Output   1125 ml   Net   -645 ml       Recent Labs     "  03/16/17   1630  03/17/17   0809   WBC  19.9*  14.6*   RBC  6.63*  5.43   HEMOGLOBIN  12.4*  10.1*   HEMATOCRIT  42.5  35.3*   MCV  64.1*  65.0*   MCH  18.7*  18.6*   MCHC  29.2*  28.6*   RDW  42.9  44.7   PLATELETCT  1011*  878*   MPV  9.7  9.7     Recent Labs      03/16/17   1630   SODIUM  133*   POTASSIUM  3.8   CHLORIDE  103   CO2  21   GLUCOSE  180*   BUN  18   CREATININE  1.37   CALCIUM  9.2     Recent Labs      03/16/17   1630   APTT  35.7   INR  1.76*     Recent Labs      03/16/17   1630  03/17/17   0545   BNPBTYPENAT  104*  103*     Recent Labs      03/16/17   1630  03/17/17   0303  03/17/17   0545  03/17/17   0709   CPKTOTAL  21   --    --    --    TROPONINI  2.66*  3.45*   --   2.67*   BNPBTYPENAT  104*   --   103*   --            EKG (3/17/2017):  I have personally reviewed the EKG this visit and discussed with the patient. It shows SR, anterior q waves, NSSTT unchanged from last admission.    Imaging reviewed    Impressions:  1. SIRS with leukocytosis  2. Thrombocytosis, severe  3. Polycythemia Vera  4. Elevated troponin, not consistent with ACS  5. PAF on Eliquis  6. 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 DM    Recommendations:  His troponin elevation is not consistent with ACS and likely represents downtrend from recent STEMI plus a component of demand related to hypotension and SIRS. I recommend as aggressive reduction in his thrombocytosis as possible. Also I would change to Prasugrel from Brilinta as Brilinta binds reversibly while Prasugrel has more permanent platelet inhibition.     1. DC Brilinta  2. 60mg loading dose of Prasugrel and 10mg daily to follow  3. Continue Eliquis and aspirin  4. Aggressive platelet reduction therapy per hematology    Hold statin due to prev allergy and continue BB/ACEI as tolerated by hemodynamics  Will Follow    Thank you for this interesting consultation. It was my pleasure to see Francesco Schulte today.

## 2017-03-17 NOTE — PROGRESS NOTES
Respiratory Rapid Response Note    Symptoms SOB     Breath Sounds  RUL Breath Sounds: Diminished (03/17/17 0537)  RML Breath Sounds: Diminished (03/17/17 0537)  RLL Breath Sounds: Diminished (03/17/17 0537)  EVELINA Breath Sounds: Diminished (03/17/17 0537)  LLL Breath Sounds: Diminished (03/17/17 0537)             O2 (FiO2): 21 (03/17/17 0537)  O2 (LPM): 0 (03/17/17 0537)  O2 Daily Delivery Respiratory : Room Air with O2 Available (03/17/17 0537)    Events/Summary/Plan: RRT SOB (03/17/17 0537)  CXR complete physician notified. Pending orders.

## 2017-03-17 NOTE — H&P
REASON FOR EVALUATION:  Generalized malaise and myalgias.    PRIMARY CARE PROVIDER:  None listed.    CARDIOLOGIST:  Neptali Watkins MD    HEMATOLOGY/ONCOLOGY:  Ron Sims MD    OUTPATIENT CARDIOLOGY:  Adin Neil MD    HISTORY OF PRESENT ILLNESS:  This is a 66-year-old male brought in via the   family.  Patient recently discharged from our hospital because of NST   elevation MI and paroxysmal atrial fibrillation and the patient was discharged   home in good condition, who comes back to the hospital complaining of   headache, body aches, and weakness.  The patient in the emergency was   hypotensive.  The patient was concerned that he has been taking a statin by   mistake.  He does have a history of STATIN ALLERGY.  Patient was evaluated in   the emergency room in addition to hypotension, has evidence of leukocytosis.    Patient was referred to me for further care and management.  He denies any   shortness of breath.  He denies any fever or chills.  The headache is a dull,   pain in bifrontal headache that was improved by morphine in the emergency   room.  The patient has a history of diabetes mellitus as well as severe   thrombocytosis on hydroxyurea.  Apparently, Dr. Sims is his oncologist in the   outpatient setting.  Patient will be admitted for further care and   management.    PAST MEDICAL HISTORY:  1.  Recent ST elevation MI.  2.  History of severe thrombocytosis.  3.  History of coronary artery disease.  4.  Hyponatremia.  5.  History of chronic systolic heart failure with EF 45%.  6.  History of benign prostatic hypertrophy.  7.  History of paroxysmal atrial fibrillation.  8.  History of dyslipidemia.  9.  Diabetes mellitus type 2.    PAST SURGICAL HISTORY:  Patient has a cardiac stent placed on this recent   admission in mid March 2017.    SOCIAL HISTORY:  Does not smoke.  Does not drink.  Denies using illicit drugs.    FAMILY HISTORY:  No family history of coronary artery disease in the  family.    ALLERGIES:  METFORMIN AND STATINS.    OUTPATIENT MEDICATIONS:  Glipizide 10 mg daily, WelChol 625 mg b.i.d., Proscar   5 mg daily, Flomax 0.4 mg b.i.d., Brilinta 90 mg b.i.d., Eliquis 5 mg b.i.d.,   Toprol-XL 25 mg daily, Lantus 20 units daily, Florinef 0.1 mg daily, aspirin   81 mg daily, and hydroxyurea 1500 mg daily.    REVIEW OF SYSTEMS:  Positive for headache.  No fever or chills.  Denies any   shortness of breath or chest pain at this time.  No nausea, no vomiting.    Generalized malaise, generalized muscle aches.  No abdominal pain,   constipation, or diarrhea.  No dysuria or hematuria.  No leg swelling or skin   rash.  The patient does have a small wound in one of his digits on his left   hand, referred to receiving outpatient wound care.  Review of systems   otherwise negative.    PHYSICAL EXAMINATION:  VITAL SIGNS:  Blood pressure is 97/56, respiratory rate 20, pulse is 87, and   temperature 36.6.  GENERAL:  He is a middle-aged  male, not in acute distress.  HEENT:  Sclerae are anicteric.  Extraocular movements are intact.  Oral mucosa   is moist.  NECK:  No JVD noted.  Neck is supple.  HEART:  S1, S2, regular rate, no murmurs appreciated.  LUNGS:  Decreased breath sounds at the bases.  No rales or wheezing.  ABDOMEN:  Distended, soft, and nontender.  Positive bowel sounds appreciated.  EXTREMITIES:  No edema noted.  No cyanosis or clubbing.  SKIN:  No rashes.    LABORATORY DATA:  EKG shows normal sinus rhythm, rate of 91.  There is loss of   R waves in anterior leads.  White cell count of 19.9, hemoglobin of 12,   hematocrit is 43, and platelet count is 1011.  Sodium 133, potassium 3.8,   glucose 118, BUN of 18, and creatinine of 1.37.  Lactic acid of 3.9.  CPK of   21.  C-reactive protein of 35.  Troponin of 2.66.  BNP of 105.  X-ray of the   chest shows no acute abnormality.    IMPRESSION:  1.  Systemic inflammatory response syndrome.  2.  Myalgias.  3.  Hypotension.  4.   Dehydration.  5.  Leukocytosis.  6.  Abnormal troponin.  7.  Lactic acidosis.  8.  Hyponatremia.  9.  History of thrombocytosis.  10.  History of paroxysmal atrial fibrillation.  11.  Recent ST elevation myocardial infarction.  12.  History of benign prostatic hypertrophy.  13.  History of diabetes mellitus type 2.  14.  History of chronic systolic heart failure, ejection fraction of 45%.    PLAN:  The patient will be admitted to telemetry and we will monitor his I's   and O's.  At this time, he will be given fluid boluses and then saline at 150   mL an hour.  Empiric antibiotics of Rocephin.  Follow up on UA, urine culture,   and blood culture.  Continue patient's aspirin and Brilinta for history of   cardiac stent.  Continue Eliquis for history of paroxysmal AFib.  Continue   Proscar and Flomax for history of BPH.  Continue patient's diabetes medication   with sliding scale insulin.  Continue patient's Florinef and hydroxyurea.     Dr. Sims hem/onc to consult in the a.m. in regards to his   thrombocytosis.  Based on what I could see the elevated number has been   consistent over the last 7 days.  This patient has multiple medical problems   and we expect that the patient will be in the hospital for a minimum of 2   days.       ____________________________________     MD KIMBERLEE ALONSO / DEANNA    DD:  03/16/2017 20:18:36  DT:  03/16/2017 21:54:21    D#:  [id number]  Job#:  [id number]

## 2017-03-17 NOTE — ED NOTES
Med rec complete per Pt's family at bedside  Allergies reviewed  Pt completed a 10 day course of Bactrim on 3/3

## 2017-03-17 NOTE — PROGRESS NOTES
Pt called RN into to room to notify of shortness of breath, pt stated it was the worst it's ever been but no longer was feeling that way, MD notified, new order of chest x-ray received

## 2017-03-17 NOTE — ED NOTES
Shade from Lab called with critical result of Platelet 1011 at 1737. Critical lab result read back to Shade.   Dr. Perez notified of critical lab result at 1737.  Critical lab result read back by Dr. Perez.

## 2017-03-17 NOTE — CONSULTS
Heme    Pt seen on am rounds.  Discussed plan with nursing, primary team, and patient.  Note to be dictated.    Ron Sims M.D.

## 2017-03-17 NOTE — DISCHARGE PLANNING
TIFFANIE spoke with April from [facility] ([phone]) who requested information on pt's anticipated length of stay and treatment plan to determine if they would want to transfer him.     TIFFANIE spoke with hospitalist RN and pt's RN and spoke with April again advising that a rapid response was called on pt last pm and pt has elevated troponins and plateletts and is likely not stable to transfer today.

## 2017-03-17 NOTE — ED NOTES
Lab called with critical result of Trop 2.66 at 1753. Critical lab result read back to lab.   Dr. Perez notified of critical lab result at 1755.  Critical lab result read back by Dr. Perez.

## 2017-03-17 NOTE — ED NOTES
IV abx and IVF started per MAR.    Pt requesting pain medication, admitting MD informed.  Family at bedside.

## 2017-03-17 NOTE — PROGRESS NOTES
Assumed pt care at 0700. Received bedside report from JOSLYN Reynoso. AAOx4. Pt denies SOB;denies pain. Provided pt with RN and CNA extension numbers on white board and encouraged pt to call when needed. Discussed plan of care for the day, pt verbalizes understanding. VSS. Denies any additional needs at this time. Call light, belongings, and phone within reach. Hourly rounding in effect. Will continue to monitor.  Pt is sleepy at this time but easily aroused.  Spoke with Dr. Sims, pt's oncologist, he will inform RN when he would like dose of hydrea to be given.

## 2017-03-18 ENCOUNTER — APPOINTMENT (OUTPATIENT)
Dept: RADIOLOGY | Facility: MEDICAL CENTER | Age: 67
DRG: 815 | End: 2017-03-18
Attending: INTERNAL MEDICINE
Payer: COMMERCIAL

## 2017-03-18 LAB
ANION GAP SERPL CALC-SCNC: 9 MMOL/L (ref 0–11.9)
BACTERIA UR CULT: NORMAL
BUN SERPL-MCNC: 17 MG/DL (ref 8–22)
CALCIUM SERPL-MCNC: 8.7 MG/DL (ref 8.5–10.5)
CHLORIDE SERPL-SCNC: 111 MMOL/L (ref 96–112)
CO2 SERPL-SCNC: 17 MMOL/L (ref 20–33)
CREAT SERPL-MCNC: 1.13 MG/DL (ref 0.5–1.4)
ERYTHROCYTE [DISTWIDTH] IN BLOOD BY AUTOMATED COUNT: 44.4 FL (ref 35.9–50)
ERYTHROCYTE [DISTWIDTH] IN BLOOD BY AUTOMATED COUNT: 45.3 FL (ref 35.9–50)
GFR SERPL CREATININE-BSD FRML MDRD: >60 ML/MIN/1.73 M 2
GLUCOSE BLD-MCNC: 159 MG/DL (ref 65–99)
GLUCOSE BLD-MCNC: 209 MG/DL (ref 65–99)
GLUCOSE SERPL-MCNC: 163 MG/DL (ref 65–99)
HCT VFR BLD AUTO: 36.3 % (ref 42–52)
HCT VFR BLD AUTO: 38.6 % (ref 42–52)
HGB BLD-MCNC: 10.5 G/DL (ref 14–18)
HGB BLD-MCNC: 11.2 G/DL (ref 14–18)
MCH RBC QN AUTO: 18.7 PG (ref 27–33)
MCH RBC QN AUTO: 18.8 PG (ref 27–33)
MCHC RBC AUTO-ENTMCNC: 28.9 G/DL (ref 33.7–35.3)
MCHC RBC AUTO-ENTMCNC: 29 G/DL (ref 33.7–35.3)
MCV RBC AUTO: 64.5 FL (ref 81.4–97.8)
MCV RBC AUTO: 64.9 FL (ref 81.4–97.8)
PLATELET # BLD AUTO: 914 K/UL (ref 164–446)
PLATELET # BLD AUTO: 966 K/UL (ref 164–446)
PMV BLD AUTO: 9.1 FL (ref 9–12.9)
PMV BLD AUTO: 9.6 FL (ref 9–12.9)
POTASSIUM SERPL-SCNC: 3.7 MMOL/L (ref 3.6–5.5)
RBC # BLD AUTO: 5.59 M/UL (ref 4.7–6.1)
RBC # BLD AUTO: 5.98 M/UL (ref 4.7–6.1)
SIGNIFICANT IND 70042: NORMAL
SITE SITE: NORMAL
SODIUM SERPL-SCNC: 137 MMOL/L (ref 135–145)
SOURCE SOURCE: NORMAL
WBC # BLD AUTO: 12 K/UL (ref 4.8–10.8)
WBC # BLD AUTO: 12.5 K/UL (ref 4.8–10.8)

## 2017-03-18 PROCEDURE — 82962 GLUCOSE BLOOD TEST: CPT

## 2017-03-18 PROCEDURE — 770020 HCHG ROOM/CARE - TELE (206)

## 2017-03-18 PROCEDURE — A9270 NON-COVERED ITEM OR SERVICE: HCPCS | Performed by: INTERNAL MEDICINE

## 2017-03-18 PROCEDURE — A9270 NON-COVERED ITEM OR SERVICE: HCPCS | Performed by: HOSPITALIST

## 2017-03-18 PROCEDURE — 700102 HCHG RX REV CODE 250 W/ 637 OVERRIDE(OP): Performed by: HOSPITALIST

## 2017-03-18 PROCEDURE — 51798 US URINE CAPACITY MEASURE: CPT

## 2017-03-18 PROCEDURE — 700105 HCHG RX REV CODE 258: Performed by: INTERNAL MEDICINE

## 2017-03-18 PROCEDURE — 80048 BASIC METABOLIC PNL TOTAL CA: CPT

## 2017-03-18 PROCEDURE — 700105 HCHG RX REV CODE 258: Performed by: HOSPITALIST

## 2017-03-18 PROCEDURE — 700102 HCHG RX REV CODE 250 W/ 637 OVERRIDE(OP): Performed by: INTERNAL MEDICINE

## 2017-03-18 PROCEDURE — 85027 COMPLETE CBC AUTOMATED: CPT

## 2017-03-18 PROCEDURE — 99233 SBSQ HOSP IP/OBS HIGH 50: CPT | Performed by: INTERNAL MEDICINE

## 2017-03-18 PROCEDURE — 700111 HCHG RX REV CODE 636 W/ 250 OVERRIDE (IP): Performed by: HOSPITALIST

## 2017-03-18 PROCEDURE — 76882 US LMTD JT/FCL EVL NVASC XTR: CPT | Mod: RT

## 2017-03-18 PROCEDURE — 99356 PR PROLONGED SVC I/P OR OBS SETTING 1ST HOUR: CPT | Performed by: INTERNAL MEDICINE

## 2017-03-18 PROCEDURE — 36415 COLL VENOUS BLD VENIPUNCTURE: CPT

## 2017-03-18 PROCEDURE — 700111 HCHG RX REV CODE 636 W/ 250 OVERRIDE (IP): Performed by: NURSE PRACTITIONER

## 2017-03-18 RX ORDER — OXYBUTYNIN CHLORIDE 5 MG/1
5 TABLET ORAL 3 TIMES DAILY PRN
Status: DISCONTINUED | OUTPATIENT
Start: 2017-03-18 | End: 2017-03-21 | Stop reason: HOSPADM

## 2017-03-18 RX ORDER — ZOLPIDEM TARTRATE 5 MG/1
5 TABLET ORAL NIGHTLY PRN
Status: DISCONTINUED | OUTPATIENT
Start: 2017-03-18 | End: 2017-03-21 | Stop reason: HOSPADM

## 2017-03-18 RX ORDER — PHENAZOPYRIDINE HYDROCHLORIDE 100 MG/1
100 TABLET, FILM COATED ORAL 3 TIMES DAILY PRN
Status: DISCONTINUED | OUTPATIENT
Start: 2017-03-18 | End: 2017-03-21 | Stop reason: HOSPADM

## 2017-03-18 RX ORDER — MORPHINE SULFATE 4 MG/ML
2 INJECTION, SOLUTION INTRAMUSCULAR; INTRAVENOUS ONCE
Status: COMPLETED | OUTPATIENT
Start: 2017-03-18 | End: 2017-03-18

## 2017-03-18 RX ORDER — METOPROLOL SUCCINATE 25 MG/1
25 TABLET, EXTENDED RELEASE ORAL
Status: DISCONTINUED | OUTPATIENT
Start: 2017-03-18 | End: 2017-03-21

## 2017-03-18 RX ORDER — HYDROCODONE BITARTRATE AND ACETAMINOPHEN 5; 325 MG/1; MG/1
1-2 TABLET ORAL EVERY 6 HOURS PRN
Status: DISCONTINUED | OUTPATIENT
Start: 2017-03-18 | End: 2017-03-21 | Stop reason: HOSPADM

## 2017-03-18 RX ADMIN — PHENAZOPYRIDINE HYDROCHLORIDE 100 MG: 100 TABLET ORAL at 17:38

## 2017-03-18 RX ADMIN — SODIUM CHLORIDE: 9 INJECTION, SOLUTION INTRAVENOUS at 09:53

## 2017-03-18 RX ADMIN — COLESEVELAM HYDROCHLORIDE 625 MG: 625 TABLET, FILM COATED ORAL at 17:38

## 2017-03-18 RX ADMIN — OXYBUTYNIN CHLORIDE 5 MG: 5 TABLET ORAL at 17:38

## 2017-03-18 RX ADMIN — SODIUM CHLORIDE: 9 INJECTION, SOLUTION INTRAVENOUS at 17:47

## 2017-03-18 RX ADMIN — METOPROLOL SUCCINATE 25 MG: 25 TABLET, EXTENDED RELEASE ORAL at 12:12

## 2017-03-18 RX ADMIN — APIXABAN 5 MG: 5 TABLET, FILM COATED ORAL at 09:50

## 2017-03-18 RX ADMIN — FINASTERIDE 5 MG: 5 TABLET, FILM COATED ORAL at 09:51

## 2017-03-18 RX ADMIN — STANDARDIZED SENNA CONCENTRATE AND DOCUSATE SODIUM 2 TABLET: 8.6; 5 TABLET, FILM COATED ORAL at 09:51

## 2017-03-18 RX ADMIN — INSULIN GLARGINE 20 UNITS: 100 INJECTION, SOLUTION SUBCUTANEOUS at 21:00

## 2017-03-18 RX ADMIN — FLUDROCORTISONE ACETATE 0.1 MG: 0.1 TABLET ORAL at 12:14

## 2017-03-18 RX ADMIN — MORPHINE SULFATE 2 MG: 4 INJECTION INTRAVENOUS at 05:20

## 2017-03-18 RX ADMIN — GLIPIZIDE 10 MG: 5 TABLET ORAL at 21:30

## 2017-03-18 RX ADMIN — TAMSULOSIN HYDROCHLORIDE 0.4 MG: 0.4 CAPSULE ORAL at 09:51

## 2017-03-18 RX ADMIN — PRASUGREL HYDROCHLORIDE 10 MG: 10 TABLET, FILM COATED ORAL at 12:13

## 2017-03-18 RX ADMIN — MORPHINE SULFATE 2 MG: 4 INJECTION INTRAVENOUS at 04:47

## 2017-03-18 RX ADMIN — APIXABAN 5 MG: 5 TABLET, FILM COATED ORAL at 21:29

## 2017-03-18 RX ADMIN — HYDROCODONE BITARTRATE AND ACETAMINOPHEN 2 TABLET: 5; 325 TABLET ORAL at 10:00

## 2017-03-18 RX ADMIN — OXYCODONE HYDROCHLORIDE 5 MG: 5 TABLET ORAL at 03:39

## 2017-03-18 RX ADMIN — TAMSULOSIN HYDROCHLORIDE 0.4 MG: 0.4 CAPSULE ORAL at 21:30

## 2017-03-18 RX ADMIN — HYDROXYUREA 2000 MG: 500 CAPSULE ORAL at 17:38

## 2017-03-18 RX ADMIN — COLESEVELAM HYDROCHLORIDE 625 MG: 625 TABLET, FILM COATED ORAL at 09:50

## 2017-03-18 RX ADMIN — OXYBUTYNIN CHLORIDE 5 MG: 5 TABLET ORAL at 09:51

## 2017-03-18 RX ADMIN — ASPIRIN 81 MG: 81 TABLET, CHEWABLE ORAL at 09:50

## 2017-03-18 RX ADMIN — PHENAZOPYRIDINE HYDROCHLORIDE 100 MG: 100 TABLET ORAL at 12:13

## 2017-03-18 RX ADMIN — CEFTRIAXONE 2 G: 2 INJECTION, POWDER, FOR SOLUTION INTRAMUSCULAR; INTRAVENOUS at 21:28

## 2017-03-18 ASSESSMENT — PAIN SCALES - GENERAL
PAINLEVEL_OUTOF10: 10
PAINLEVEL_OUTOF10: 2
PAINLEVEL_OUTOF10: 10
PAINLEVEL_OUTOF10: 0
PAINLEVEL_OUTOF10: 4

## 2017-03-18 ASSESSMENT — ENCOUNTER SYMPTOMS
BRUISES/BLEEDS EASILY: 0
HEARTBURN: 0
NAUSEA: 0
HEADACHES: 0
PALPITATIONS: 0
DIZZINESS: 0
VOMITING: 0
MYALGIAS: 1
HEMOPTYSIS: 0
BLURRED VISION: 0
CHILLS: 0
COUGH: 0
FEVER: 0
WEAKNESS: 1

## 2017-03-18 NOTE — DISCHARGE PLANNING
Medical Social Work    SW received four separate phone calls from April at [facility] requesting pt's physical address. TIFFANIE advised April she would call her back as soon as the information was obtained for the pt. TIFFANIE attempted to meet with pt. However, pt was asleep. TIFFANIE placed call to pt's spouse Alysa (ph: [phone]) to confirm pt's physical address.     Pt's physical address is [address] [place] [id number]. TIFFANIE provided this information to April at [facility].

## 2017-03-18 NOTE — PROGRESS NOTES
Report received by day shift RN. Pt sitting up at the edge of the bed with family at bedside. Both updated to POC verbalized understanding. Pt still c/o tenderness in his left arm with palpation. Pt does not ask for intervention. Bed locked in low position with fall precautions in place and call light in place.

## 2017-03-18 NOTE — PROGRESS NOTES
Oncology/Hematology Progress Note               Author: Suzan Sims Date & Time created: 3/18/2017  12:31 PM   CC;  MPD  Interval History:  He feels ok today.  No chest pain/ sob    Review of Systems:  Review of Systems   Constitutional: Positive for malaise/fatigue. Negative for fever and chills.   HENT: Negative for hearing loss.    Eyes: Negative for blurred vision.   Respiratory: Negative for cough and hemoptysis.    Cardiovascular: Negative for chest pain and palpitations.   Gastrointestinal: Negative for heartburn, nausea and vomiting.   Musculoskeletal: Positive for myalgias.   Neurological: Positive for weakness. Negative for dizziness and headaches.   Endo/Heme/Allergies: Negative for environmental allergies. Does not bruise/bleed easily.       Physical Exam:  Physical Exam   Constitutional: He is oriented to person, place, and time. He appears well-developed.   HENT:   Head: Normocephalic.   Eyes: Pupils are equal, round, and reactive to light.   Cardiovascular: Normal rate and regular rhythm.    Pulmonary/Chest: Effort normal and breath sounds normal.   Abdominal: Soft. Bowel sounds are normal.   Neurological: He is alert and oriented to person, place, and time.   Psychiatric: He has a normal mood and affect. His behavior is normal. Judgment and thought content normal.       Labs:        Invalid input(s): [id number]  Recent Labs      03/16/17   1630  03/17/17   0303  03/17/17   0545  03/17/17   0709   CPKTOTAL  21   --    --    --    TROPONINI  2.66*  3.45*   --   2.67*   BNPBTYPENAT  104*   --   103*   --      Recent Labs      03/16/17   1630  03/18/17   0147   SODIUM  133*  137   POTASSIUM  3.8  3.7   CHLORIDE  103  111   CO2  21  17*   BUN  18  17   CREATININE  1.37  1.13   CALCIUM  9.2  8.7     Recent Labs      03/16/17   1630  03/18/17   0147   ALTSGPT  38   --    ASTSGOT  32   --    ALKPHOSPHAT  57   --    TBILIRUBIN  1.2   --    GLUCOSE  180*  163*     Recent Labs      03/16/17   1630    17   0903   RBC  6.63*   < >  5.73  5.59  5.98   HEMOGLOBIN  12.4*   < >  10.5*  10.5*  11.2*   HEMATOCRIT  42.5   < >  37.1*  36.3*  38.6*   PLATELETCT  1011*   < >  940*  966*  914*   PROTHROMBTM  21.1*   --    --    --    --    APTT  35.7   --    --    --    --    INR  1.76*   --    --    --    --     < > = values in this interval not displayed.     Recent Labs      17   0903   WBC  19.9*   < >  13.6*  12.5*  12.0*   NEUTSPOLYS  93.40*   --    --    --    --    LYMPHOCYTES  2.30*   --    --    --    --    MONOCYTES  2.30   --    --    --    --    EOSINOPHILS  0.70   --    --    --    --    BASOPHILS  0.60   --    --    --    --    ASTSGOT  32   --    --    --    --    ALTSGPT  38   --    --    --    --    ALKPHOSPHAT  57   --    --    --    --    TBILIRUBIN  1.2   --    --    --    --     < > = values in this interval not displayed.     Recent Labs      17   SODIUM  133*  137   POTASSIUM  3.8  3.7   CHLORIDE  103  111   CO2  21  17*   GLUCOSE  180*  163*   BUN  18  17   CREATININE  1.37  1.13   CALCIUM  9.2  8.7     Hemodynamics:  Temp (24hrs), Av.6 °C (97.9 °F), Min:36.2 °C (97.2 °F), Max:37.1 °C (98.7 °F)  Temperature: 36.7 °C (98 °F)  Pulse  Av.2  Min: 54  Max: 96   Blood Pressure: 141/91 mmHg     Respiratory:    Respiration: 18, Pulse Oximetry: 97 %        RUL Breath Sounds: Clear, RML Breath Sounds: Clear, RLL Breath Sounds: Diminished, EVELINA Breath Sounds: Clear, LLL Breath Sounds: Diminished  Fluids:    Intake/Output Summary (Last 24 hours) at 17 1231  Last data filed at 17 1100   Gross per 24 hour   Intake    480 ml   Output   2800 ml   Net  -2320 ml     Weight: 101.2 kg (223 lb 1.7 oz)  GI/Nutrition:  Orders Placed This Encounter   Procedures   • Diet Order     Standing Status: Standing      Number of Occurrences: 1      Standing Expiration Date:      Order  Specific Question:  Diet:     Answer:  Cardiac [6]     Order Specific Question:  Diet:     Answer:  Diabetic [3]     Medical Decision Making, by Problem:  Active Hospital Problems    Diagnosis   • *Thrombocytosis (CMS-[facility]) [D47.3]   • History of ST elevation myocardial infarction (STEMI) [I25.2]   • Coagulopathy (CMS-[facility]) [D68.9]   • Polycythemia [D75.1]   • CAD (coronary artery disease) [I25.10]   • Hyponatremia [E87.1]   • Paroxysmal atrial fibrillation (CMS-[facility]) [I48.0]   • DM2 (diabetes mellitus, type 2) (CMS-HCC) [E11.9]       Plan:  1.  MPD:  Calos 2 + by history.  His counts better.  Will continue with 2 g hydrea daily.  Daily cbc.  Our goal is for platelet count 400k or less and hemoglobin <45.  Kidney function is ok.    High risk medicine/ Moderate complexity.> 20 min discussed with nursing as well.  Labs reviewed and Medications reviewed

## 2017-03-18 NOTE — CARE PLAN
Problem: Safety  Goal: Will remain free from injury  Intervention: Collaborate with Interdisciplinary Team for safe transfer and mobilization techniques  Pt has periods of weakness and advised for assistance in ambulation. Pt calls for assist and able to ambulate steady without any assistive devices.       Problem: Knowledge Deficit  Goal: Knowledge of disease process/condition, treatment plan, diagnostic tests, and medications will improve  Intervention: Explain information regarding disease process/condition, treatment plan, diagnostic tests, and medications and document in education  Educated pt and family to current POC and indications for treatments. Both verbalized understanding.

## 2017-03-18 NOTE — CONSULTS
DATE OF SERVICE:  03/17/2017    REASON FOR CONSULTATION:  Polycythemia vera, thrombocytosis.    HISTORY OF PRESENT ILLNESS:  This is a very nice 66-year-old gentleman who was   originally consulted on by Dr. Flores on 03/12/2017.  He was recently   discharged from the hospital after his thrombosed LAD stents and cardiac   intervention and he was discharged on Hydrea 1500 mg.    He has got strong history of atrial fibrillation, diabetes and hyperlipidemia.    He was diagnosed with polycythemia vera in [place] in 2008 when he   presented with a myocardial infarction.  He had told his last admission, most   of these events turn to happen when his counts get over a million or a 1.1   million.  He had told us that he had a JAK2 mutation.  We do not have any of   his records from [place].  He was on and off Hydrea for some time.  He would   develop a rash sometimes from the waist up in the arms and shoulders and back   and it would just get worse as his dose of Hydrea,  Then he would be on and   off with a lower dose of Hydrea and was stable, but then his platelets did end   up getting higher.  More recently when he just came in last time, he was   found to have a platelet count over a million and not when he typically   develops a clot.  He has had 2 prior MIs and stent thrombosis and even a   stroke in the past.  He never had any history of any other blood clots in the   sense of venous.    He at this time he said he took Hydrea 2 pills with 1000 prior to coming to   the emergency room.  He just was not feeling good.  He was somewhat lethargic.    There was some concern in the emergency room for infection, possibly he did   have a lower blood pressure.  He tells me he was dehydrated.  He has since   been resuscitated and is stable and does not have any acute symptoms right   now.  He did get a total of 2 g of Hydrea on the day of admission, on   03/16/2017.    PAST MEDICAL HISTORY:  1.  JAK2 positive polycythemia  vera.  2.  Coronary artery disease status post stenting and removal of clot from   stents in 2008, 2010 and now 2017.  3.  Stroke in December 2016 after a diagnosis of atrial fibrillation.  4.  Type 2 diabetes.  5.  Hyperlipidemia.  6.  BPH.  7.  Chronic back pain.    PAST SURGICAL HISTORY:  ____ stent placement, thrombectomy 2008, 2010. 2017,   appendectomy, tonsillectomy, cervical diskectomy.    MEDICATIONS:  He was on Eliquis, aspirin, famotidine, finasteride, glipizide,   hydroxyurea 1500, insulin, Brilinta, Flomax.    ALLERGIES:  TO METFORMIN.    SOCIAL HISTORY:  , wife and children.  He is an , owns his own   company.  No alcohol or drug use.    FAMILY HISTORY:  Noncontributory.    PHYSICAL EXAMINATION:  VITAL SIGNS:  Stable.  GENERAL:  He looks tired and fatigued.  He just woke up when I saw him this   morning.  NECK:  Supple, no adenopathy.  LUNGS:  Clear bilaterally.  CARDIOVASCULAR:  Regular rhythm.  Normal S1 and S2.  ABDOMEN:  Soft, nontender.  EXTREMITIES:  No clubbing, cyanosis, or edema.  NEUROLOGIC:  Intact.  No other focal findings.    IMPRESSION AND PLAN:  1.  Hematology.  We discussed that we do need to continue Hydrea.  He has not   had any side effects of a rash.  We will continue with his 2 g.  His platelet   count was 878 this morning.  Hopefully, we can continue to target to get down   to 400,000.  His hematocrit is okay.    We had discussed at last admission other therapies for polycythemia vera,   essential thrombocythemia overlap.  I told him if he did not tolerate Hydrea,   which I do think that we have given enough time.  We talked about interferon   as well as Jakafi.  We will continue 2000 mg a day.    I did discuss with the hospitalist service that in some patients with   myeloproliferative when they are over a million, they can potentially get a   risk for acquired von Willebrand's.  However, his risk ____ for coronary and   arterial thrombosis, I think it is okay to  continue his current medications   with his recent events.  He has no signs or symptoms of any bleeding at this   time.  2.  Cardiology.  He is followed by cardiology.  They have him on long-term   anticoagulation.  We will continue to follow along closely in this high risk   complex patient.       ____________________________________     MD DEAN CASSIDY / DEANNA    DD:  03/17/2017 22:02:23  DT:  03/17/2017 23:38:05    D#:  [id number]  Job#:  [id number]

## 2017-03-18 NOTE — PROGRESS NOTES
"Interval History:  66M with PV and thrombocytosis with recent acute stent thrombosis of LAD and resultant STEMI s/p PCI with [id number] OSWALD and 2.5x28mm midLAD OSWALD and LVEF 45% (chronic) with PAF on Elequis and DM. Presents with myalgias, weakness and headach and was diagnosed with SIRS with elevated lactate and CRP. Administered fluids and labs note an elevated troponin down from prior with a slight bump but overall downward trend. No chest pain. SR currently. Has not missed his medications.   3/18: No CV events. No Chest symptoms. Tele reviewed, NSVT brief.    Physical Exam   Blood pressure 146/82, pulse 54, temperature 36.6 °C (97.8 °F), resp. rate 16, height 1.905 m (6' 3\"), weight 101.2 kg (223 lb 1.7 oz), SpO2 95 %.    Constitutional: Appears well-developed.   HENT: Normocephalic and atraumatic. No scleral icterus.   Neck: No JVD present.   Cardiovascular: Normal rate. Exam reveals no gallop and no friction rub. No murmur heard.   Pulmonary/Chest: CTAB   Abdominal: S/NT/ND BS+   Musculoskeletal: Exhibits no edema. Pulses present.  Skin: Skin is warm and dry.     ROS: As HPI other reviewed and negative       Intake/Output Summary (Last 24 hours) at 03/18/17 1100  Last data filed at 03/18/17 1000   Gross per 24 hour   Intake    480 ml   Output    550 ml   Net    -70 ml       Recent Labs      03/17/17   2050  03/18/17   0147  03/18/17   0903   WBC  13.6*  12.5*  12.0*   RBC  5.73  5.59  5.98   HEMOGLOBIN  10.5*  10.5*  11.2*   HEMATOCRIT  37.1*  36.3*  38.6*   MCV  64.7*  64.9*  64.5*   MCH  18.3*  18.8*  18.7*   MCHC  28.3*  28.9*  29.0*   RDW  44.3  45.3  44.4   PLATELETCT  940*  966*  914*   MPV  9.7  9.6  9.1     Recent Labs      03/16/17   1630  03/18/17   0147   SODIUM  133*  137   POTASSIUM  3.8  3.7   CHLORIDE  103  111   CO2  21  17*   GLUCOSE  180*  163*   BUN  18  17   CREATININE  1.37  1.13   CALCIUM  9.2  8.7     Recent Labs      03/16/17   1630   APTT  35.7   INR  1.76*     Recent Labs      " 03/16/17   1630  03/17/17   0545   BNPBTYPENAT  104*  103*     Recent Labs      03/16/17   1630  03/17/17   0303  03/17/17   0545  03/17/17   0709   CPKTOTAL  21   --    --    --    TROPONINI  2.66*  3.45*   --   2.67*   BNPBTYPENAT  104*   --   103*   --            No current facility-administered medications on file prior to encounter.     Current Outpatient Prescriptions on File Prior to Encounter   Medication Sig Dispense Refill   • hydroxyurea (HYDREA) 500 MG Cap Take 3 Caps by mouth every day. 30 Cap 0   • aspirin (ASA) 81 MG Chew Tab chewable tablet Take 1 Tab by mouth every day. 100 Tab 0   • fludrocortisone (FLORINEF) 0.1 MG Tab Take 1 Tab by mouth every morning. 30 Tab 0   • diphenhydrAMINE (BENADRYL) 50 MG Cap Take 50 mg by mouth every 6 hours as needed.     • insulin glargine (LANTUS) 100 UNIT/ML Solution Inject 20 Units as instructed every evening.     • mupirocin calcium (BACTROBAN) 2 % Cream Apply twice a day and/ or after each washing to affected area. 1 Tube 0   • metoprolol SR (TOPROL XL) 25 MG TABLET SR 24 HR Take 1 Tab by mouth every day. 30 Tab 2   • apixaban (ELIQUIS) 5mg Tab Take 1 Tab by mouth 2 Times a Day. 60 Tab 1   • ticagrelor (BRILINTA) 90 MG Tab tablet Take 90 mg by mouth 2 Times a Day.     • tamsulosin (FLOMAX) 0.4 MG capsule Take 0.4 mg by mouth 2 Times a Day.     • finasteride (PROSCAR) 5 MG Tab Take 5 mg by mouth every day.     • colesevelam (WELCHOL) 625 MG Tab Take 625 mg by mouth 2 times a day, with meals.     • glipiZIDE (GLUCOTROL) 10 MG Tab Take 10 mg by mouth every evening.         Current Facility-Administered Medications   Medication Dose Frequency Provider Last Rate Last Dose   • oxybutynin (DITROPAN) tablet 5 mg  5 mg TID PRN Elton Cloud M.D.   5 mg at 03/18/17 0951   • phenazopyridine (PYRIDIUM) tablet 100 mg  100 mg TID PRN Conde O Ai, M.D.       • hydrocodone-acetaminophen (NORCO) 5-325 MG per tablet 1-2 Tab  1-2 Tab Q6HRS PRN Elton Cloud M.D.   2 Tab at  03/18/17 1000   • zolpidem (AMBIEN) tablet 5 mg  5 mg HS PRN Elton Cloud M.D.       • prasugrel (EFFIENT) tablet 10 mg  10 mg DAILY Artemio Lake M.D.       • famotidine (PEPCID) tablet 20 mg  20 mg PRN Elton Cloud M.D.   20 mg at 03/17/17 1750   • apixaban (ELIQUIS) tablet 5 mg  5 mg BID Kody Dukes M.D.   5 mg at 03/18/17 0950   • aspirin (ASA) chewable tab 81 mg  81 mg DAILY Kody Dukes M.D.   81 mg at 03/18/17 0950   • colesevelam (WELCHOL) tablet 625 mg  625 mg BID WITH MEALS Kody Dukes M.D.   625 mg at 03/18/17 0950   • finasteride (PROSCAR) tablet 5 mg  5 mg DAILY Kody Dukes M.D.   5 mg at 03/18/17 0951   • fludrocortisone (FLORINEF) tablet 0.1 mg  0.1 mg QAM Kody Dukes M.D.   0.1 mg at 03/17/17 1040   • glipiZIDE (GLUCOTROL) tablet 10 mg  10 mg Q EVENING Kody Dukes M.D.   10 mg at 03/17/17 2121   • insulin glargine (LANTUS) injection 20 Units  20 Units Nightly Kody Dukes M.D.   Stopped at 03/16/17 2101   • tamsulosin (FLOMAX) capsule 0.4 mg  0.4 mg BID Kody Dukes M.D.   0.4 mg at 03/18/17 0951   • senna-docusate (PERICOLACE or SENOKOT S) 8.6-50 MG per tablet 2 Tab  2 Tab BID Kody Dukes M.D.   2 Tab at 03/18/17 0951    And   • polyethylene glycol/lytes (MIRALAX) PACKET 1 Packet  1 Packet QDAY PRN Kody Dukes M.D.        And   • magnesium hydroxide (MILK OF MAGNESIA) suspension 30 mL  30 mL QDAY PRN Kody Dukes M.D.        And   • bisacodyl (DULCOLAX) suppository 10 mg  10 mg QDAY PRN Kody Dukes M.D.       • Respiratory Care per Protocol   Continuous RT Kody Dukes M.D.       • ondansetron (ZOFRAN) syringe/vial injection 4 mg  4 mg Q4HRS PRN Kody Dukes M.D.       • ondansetron (ZOFRAN ODT) dispertab 4 mg  4 mg Q4HRS PRN Kody Dukes M.D.       • promethazine (PHENERGAN) tablet 12.5-25 mg  12.5-25 mg Q4HRS PRN Kody Dukes M.D.       • promethazine (PHENERGAN) suppository 12.5-25 mg  12.5-25 mg Q4HRS PRSENDY Gates  ATUL Dkues M.D.       • prochlorperazine (COMPAZINE) injection 5-10 mg  5-10 mg Q4HRS PRN Kody Dukes M.D.       • insulin lispro (HUMALOG) injection 2-9 Units  2-9 Units 4X/DAY ACHKALIE Dukes M.D.   Stopped at 03/17/17 0700   • NS infusion 2,859 mL  30 mL/kg Once PRN Kody Dukes M.D.       • NS infusion   Continuous Elton Cloud M.D. 75 mL/hr at 03/18/17 0953     • NS (BOLUS) infusion 500 mL  500 mL Once PRN Kody Dukes M.D.       • cefTRIAXone (ROCEPHIN) 2 g in  mL IVPB  2 g Q24HR Kody Dukes M.D.   Stopped at 03/17/17 2200   • acetaminophen (TYLENOL) tablet 650 mg  650 mg Q4HRS PRN Kody Dukes M.D.       • hydroxyurea (HYDREA) capsule 2,000 mg  2,000 mg DAILY Kody Dukes M.D.   2,000 mg at 03/17/17 1630     Last reviewed on 3/16/2017  7:01 PM by Usman Wolff  Medications reviewed    Imaging reviewed    Impressions:  1. SIRS with leukocytosis  2. Thrombocytosis, severe  3. Polycythemia Vera  4. Elevated troponin, not consistent with ACS  5. PAF on Eliquis  6. 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 DM    Recommendations:  Continue asa  Continue Prasugrel in preference to Brilinta  Continue Elequis  Agree with aggressive platelet reduction  Restart Toprol

## 2017-03-18 NOTE — PROGRESS NOTES
Pt c/o pain r/t bladder. Bladder scan showed 540mL. Hospitalist called orders for go catheter received for urinary retention.

## 2017-03-18 NOTE — DISCHARGE PLANNING
Medical Social Work    SW received pages to call April from [place]. SW placed call to April at (ph: [phone]) and left voicemail with return call info as April did not answer.

## 2017-03-18 NOTE — DISCHARGE PLANNING
Medical Social Work    SW received another call from April at [facility] stating pt is to transfer to [facility] in [facility]. Pt is refusing transfer to this facility.    SW provided update to April at [facility] and ER SW regarding pt's refusal to transfer.

## 2017-03-18 NOTE — PROGRESS NOTES
HOSPITALIST PROGRESS NOTE (SOAP)   Date of Service  3/17  CHIEF COMPLAINT  66 y.o.yo male presented 3/16/2017 with Weakness; Body Aches; and Headache    SUBJECTIVE  Chart reviewed. He looks anxious. Currently no chest pain but has dyspnea on exertion.  PHYSICAL EXAM  Filed Vitals:    03/17/17 0538 03/17/17 0800 03/17/17 1200 03/17/17 1600   BP: 114/69 113/66 115/74 116/79   Pulse: 84 56 61 60   Temp:  36.6 °C (97.8 °F) 36.4 °C (97.6 °F) 36.4 °C (97.6 °F)   Resp: 34 16 17 16   Height:       Weight:       SpO2: 99% 98% 98% 97%       Intake/Output Summary (Last 24 hours) at 03/17/17 1759  Last data filed at 03/17/17 0800   Gross per 24 hour   Intake    480 ml   Output   1125 ml   Net   -645 ml   Vitals reviewed  General/Constitutional: No acute distress.   Head: Normocephalic, atraumatic  ENT: Oral mucosa is moist. No obvious pharyngeal exudates  Eyes: Pink conjunctiva, no scleral icterus  Neck: Supple, no lymphadenopathy  Cardiovascular: Normal rate and regular rhythm. S1,2 noted. No murmurs, gallops or rubs.  Pulmonary: Clear to auscultation bilaterally. No wheezes, rales or rhonchi  Abdominal: Soft, nontender, not particularly distended to me, bowel sounds normoactive.  Musculoskeletal: No tenderness to palpation of chest wall.  Neurologic: Grossly nonfocal, moving all extremities.  Genitourinary: No gross hematuria  Skin: No obvious rash.  Psychiatric: Pleasant, cooperative.    LABS  Lab Results   Component Value Date/Time    WBC 14.9* 03/17/2017 02:55 PM    RBC 5.85 03/17/2017 02:55 PM    HEMOGLOBIN 11.0* 03/17/2017 02:55 PM    HEMATOCRIT 37.8* 03/17/2017 02:55 PM    MCV 64.6* 03/17/2017 02:55 PM    MCH 18.8* 03/17/2017 02:55 PM    MCHC 29.1* 03/17/2017 02:55 PM    MPV 9.6 03/17/2017 02:55 PM    NEUTROPHILS-POLYS 93.40* 03/16/2017 04:30 PM    LYMPHOCYTES 2.30* 03/16/2017 04:30 PM    MONOCYTES 2.30 03/16/2017 04:30 PM    EOSINOPHILS 0.70 03/16/2017 04:30 PM    BASOPHILS 0.60 03/16/2017 04:30 PM    HYPOCHROMIA 1+  03/16/2017 04:30 PM    ANISOCYTOSIS 1+ 03/16/2017 04:30 PM      Lab Results   Component Value Date/Time    SODIUM 133* 03/16/2017 04:30 PM    POTASSIUM 3.8 03/16/2017 04:30 PM    CHLORIDE 103 03/16/2017 04:30 PM    CO2 21 03/16/2017 04:30 PM    GLUCOSE 180* 03/16/2017 04:30 PM    BUN 18 03/16/2017 04:30 PM    CREATININE 1.37 03/16/2017 04:30 PM      Lab Results   Component Value Date/Time    PT 21.1* 03/16/2017 04:30 PM    INR 1.76* 03/16/2017 04:30 PM      BLOOD CULTURE   Date Value Ref Range Status   03/16/2017   Preliminary    No Growth    Note: Blood cultures are incubated for 5 days and  are monitored continuously.Positive blood cultures  are called to the RN and reported as soon as  they are identified.       Labs reviewed  Imaging reviewed  ASSESSMENT and PLAN    Principal Problem:    Thrombocytosis (CMS-HCC)    Systemic inflammatory response syndrome  Active Problems:    Polycythemia    Coagulopathy (CMS-HCC)  Discussed with Dr. Sims, Oncology. Noted thrombotic AND bleeding complications with this. Getting hydroxyurea. Platelets to be trended. Defer further platelet reduction plan to Hematology Oncology (e.g. May consider plateletpheresis?). Because of recent STEMI, he is on  antiplatelets. However, there is a risk of von Willebrands disease and bleeding with aspirin. He is currently on that for his STEMI. A ristocetin cofactor can be obtained as if that is less than 30%, risk of bleeding diathesis with aspirin. Low dose aspirin is acceptable if that is greater than 30%. I will try to obtain that. However I will leave it up to Dr. Sims to make recommendations regarding the aspirin and will have Dr. Lake, Cardiology review that recommendation. Other risks include transformatuion to leukemia or a myelodysplastic syndrome.  Defer further platelet reduction therapy to Dr. Sims.    History of ST elevation myocardial infarction (STEMI)    Paroxysmal atrial fibrillation (CMS-HCC)    CAD (coronary artery  disease)  Currently sinus. Dr. Lake, Cardiology consulted. Elevated troponins likely due to the recent STEMI he had and not consistent with ACS. Blinta switched to Prasugrel, Eliquis and aspirin to be continued. See above about comments on aspirin.    Hyponatremia  Mild at best. Trend Na.    DM2 (diabetes mellitus, type 2) (CMS-[facility])  Continue sliding scale insulin and glipizide    On anticoagulation to cover for DVT prophylaxis; however defer to Dr. Sims, Hematology for management of coagulopathy.          I spent 39 minutes, reviewing the chart, notes, vitals, labs, imaging, ordering labs, evaluating Francesco Schulte for assessment, enacting the plan above and writing this note. 50% of the time was spent in counseling Francesco Schulte and answering questions. Al the above were discussed. Medical decision making is therefore complex.

## 2017-03-18 NOTE — DISCHARGE PLANNING
Medical Social Work    TIFFANIE received call back from April at [place]. TIFFANIE informed April that pt is not medically stable to transfer today. April requested information on why pt is not stable, TIFFANIE directed April to pt's nurse for medical questions.

## 2017-03-19 LAB
ANION GAP SERPL CALC-SCNC: 7 MMOL/L (ref 0–11.9)
BUN SERPL-MCNC: 16 MG/DL (ref 8–22)
CALCIUM SERPL-MCNC: 8.6 MG/DL (ref 8.5–10.5)
CHLORIDE SERPL-SCNC: 112 MMOL/L (ref 96–112)
CO2 SERPL-SCNC: 20 MMOL/L (ref 20–33)
CREAT SERPL-MCNC: 0.96 MG/DL (ref 0.5–1.4)
ERYTHROCYTE [DISTWIDTH] IN BLOOD BY AUTOMATED COUNT: 44.4 FL (ref 35.9–50)
GFR SERPL CREATININE-BSD FRML MDRD: >60 ML/MIN/1.73 M 2
GLUCOSE BLD-MCNC: 287 MG/DL (ref 65–99)
GLUCOSE BLD-MCNC: 93 MG/DL (ref 65–99)
GLUCOSE SERPL-MCNC: 120 MG/DL (ref 65–99)
HCT VFR BLD AUTO: 36.5 % (ref 42–52)
HGB BLD-MCNC: 10.6 G/DL (ref 14–18)
MCH RBC QN AUTO: 18.8 PG (ref 27–33)
MCHC RBC AUTO-ENTMCNC: 29 G/DL (ref 33.7–35.3)
MCV RBC AUTO: 64.6 FL (ref 81.4–97.8)
PLATELET # BLD AUTO: 885 K/UL (ref 164–446)
PMV BLD AUTO: 9.4 FL (ref 9–12.9)
POTASSIUM SERPL-SCNC: 3.6 MMOL/L (ref 3.6–5.5)
RBC # BLD AUTO: 5.65 M/UL (ref 4.7–6.1)
SODIUM SERPL-SCNC: 139 MMOL/L (ref 135–145)
WBC # BLD AUTO: 9.7 K/UL (ref 4.8–10.8)

## 2017-03-19 PROCEDURE — 700102 HCHG RX REV CODE 250 W/ 637 OVERRIDE(OP): Performed by: HOSPITALIST

## 2017-03-19 PROCEDURE — 82962 GLUCOSE BLOOD TEST: CPT

## 2017-03-19 PROCEDURE — 99233 SBSQ HOSP IP/OBS HIGH 50: CPT | Performed by: INTERNAL MEDICINE

## 2017-03-19 PROCEDURE — 80048 BASIC METABOLIC PNL TOTAL CA: CPT

## 2017-03-19 PROCEDURE — A9270 NON-COVERED ITEM OR SERVICE: HCPCS | Performed by: HOSPITALIST

## 2017-03-19 PROCEDURE — 700102 HCHG RX REV CODE 250 W/ 637 OVERRIDE(OP): Performed by: INTERNAL MEDICINE

## 2017-03-19 PROCEDURE — 700105 HCHG RX REV CODE 258: Performed by: INTERNAL MEDICINE

## 2017-03-19 PROCEDURE — 770020 HCHG ROOM/CARE - TELE (206)

## 2017-03-19 PROCEDURE — 85027 COMPLETE CBC AUTOMATED: CPT

## 2017-03-19 PROCEDURE — 700111 HCHG RX REV CODE 636 W/ 250 OVERRIDE (IP): Performed by: INTERNAL MEDICINE

## 2017-03-19 PROCEDURE — 36415 COLL VENOUS BLD VENIPUNCTURE: CPT

## 2017-03-19 PROCEDURE — A9270 NON-COVERED ITEM OR SERVICE: HCPCS | Performed by: INTERNAL MEDICINE

## 2017-03-19 RX ADMIN — APIXABAN 5 MG: 5 TABLET, FILM COATED ORAL at 20:56

## 2017-03-19 RX ADMIN — METOPROLOL SUCCINATE 25 MG: 25 TABLET, EXTENDED RELEASE ORAL at 11:55

## 2017-03-19 RX ADMIN — APIXABAN 5 MG: 5 TABLET, FILM COATED ORAL at 10:18

## 2017-03-19 RX ADMIN — TAMSULOSIN HYDROCHLORIDE 0.4 MG: 0.4 CAPSULE ORAL at 20:56

## 2017-03-19 RX ADMIN — SODIUM CHLORIDE: 9 INJECTION, SOLUTION INTRAVENOUS at 05:55

## 2017-03-19 RX ADMIN — PHENAZOPYRIDINE HYDROCHLORIDE 100 MG: 100 TABLET ORAL at 10:18

## 2017-03-19 RX ADMIN — FAMOTIDINE 20 MG: 20 TABLET, FILM COATED ORAL at 01:03

## 2017-03-19 RX ADMIN — OXYBUTYNIN CHLORIDE 5 MG: 5 TABLET ORAL at 10:17

## 2017-03-19 RX ADMIN — CEFTRIAXONE 2 G: 2 INJECTION, POWDER, FOR SOLUTION INTRAMUSCULAR; INTRAVENOUS at 20:57

## 2017-03-19 RX ADMIN — TAMSULOSIN HYDROCHLORIDE 0.4 MG: 0.4 CAPSULE ORAL at 10:16

## 2017-03-19 RX ADMIN — COLESEVELAM HYDROCHLORIDE 625 MG: 625 TABLET, FILM COATED ORAL at 10:19

## 2017-03-19 RX ADMIN — GLIPIZIDE 10 MG: 5 TABLET ORAL at 20:55

## 2017-03-19 RX ADMIN — FINASTERIDE 5 MG: 5 TABLET, FILM COATED ORAL at 10:18

## 2017-03-19 RX ADMIN — INSULIN GLARGINE 20 UNITS: 100 INJECTION, SOLUTION SUBCUTANEOUS at 21:04

## 2017-03-19 RX ADMIN — HYDROXYUREA 2000 MG: 500 CAPSULE ORAL at 17:44

## 2017-03-19 RX ADMIN — COLESEVELAM HYDROCHLORIDE 625 MG: 625 TABLET, FILM COATED ORAL at 17:45

## 2017-03-19 RX ADMIN — ASPIRIN 81 MG: 81 TABLET, CHEWABLE ORAL at 10:17

## 2017-03-19 RX ADMIN — PRASUGREL HYDROCHLORIDE 10 MG: 10 TABLET, FILM COATED ORAL at 10:17

## 2017-03-19 RX ADMIN — SODIUM CHLORIDE: 9 INJECTION, SOLUTION INTRAVENOUS at 17:47

## 2017-03-19 RX ADMIN — SODIUM CHLORIDE: 9 INJECTION, SOLUTION INTRAVENOUS at 20:57

## 2017-03-19 ASSESSMENT — ENCOUNTER SYMPTOMS
COUGH: 0
CHILLS: 0
SHORTNESS OF BREATH: 0
VOMITING: 0
DIZZINESS: 0
NAUSEA: 0
BLURRED VISION: 0
DEPRESSION: 0
WEAKNESS: 0
CLAUDICATION: 0
ORTHOPNEA: 0
HEADACHES: 0
NECK PAIN: 0
TINGLING: 0
HEARTBURN: 0
FEVER: 0
SENSORY CHANGE: 0
BRUISES/BLEEDS EASILY: 0
PALPITATIONS: 0
HEMOPTYSIS: 0
PND: 0

## 2017-03-19 ASSESSMENT — PAIN SCALES - GENERAL
PAINLEVEL_OUTOF10: 0
PAINLEVEL_OUTOF10: 2
PAINLEVEL_OUTOF10: 0

## 2017-03-19 NOTE — PROGRESS NOTES
Oncology/Hematology Progress Note               Author: Suzan Sims Date & Time created: 3/19/2017  12:29 PM   CC;  MPD  Interval History:  He feels better today.  No chest pain/ sob.  He is starting to get more strength.    Review of Systems:  Review of Systems   Constitutional: Positive for malaise/fatigue. Negative for fever and chills.   HENT: Negative for hearing loss.    Eyes: Negative for blurred vision.   Respiratory: Negative for cough and hemoptysis.    Cardiovascular: Negative for chest pain and palpitations.   Gastrointestinal: Negative for heartburn, nausea and vomiting.   Genitourinary: Negative for dysuria.   Musculoskeletal: Negative for neck pain.   Neurological: Negative for dizziness, tingling, sensory change, weakness and headaches.   Endo/Heme/Allergies: Negative for environmental allergies. Does not bruise/bleed easily.   Psychiatric/Behavioral: Negative for depression.       Physical Exam:  Physical Exam   Constitutional: He is oriented to person, place, and time. He appears well-developed.   HENT:   Head: Normocephalic.   Eyes: Pupils are equal, round, and reactive to light.   Cardiovascular: Normal rate and regular rhythm.    Pulmonary/Chest: Effort normal and breath sounds normal.   Abdominal: Soft. Bowel sounds are normal.   Neurological: He is alert and oriented to person, place, and time.   Psychiatric: He has a normal mood and affect. His behavior is normal. Judgment and thought content normal.       Labs:        Invalid input(s): [id number]  Recent Labs      03/16/17   1630  03/17/17   0303  03/17/17   0545  03/17/17   0709   CPKTOTAL  21   --    --    --    TROPONINI  2.66*  3.45*   --   2.67*   BNPBTYPENAT  104*   --   103*   --      Recent Labs      03/16/17   1630  03/18/17   0147  03/19/17   0311   SODIUM  133*  137  139   POTASSIUM  3.8  3.7  3.6   CHLORIDE  103  111  112   CO2  21  17*  20   BUN  18  17  16   CREATININE  1.37  1.13  0.96   CALCIUM  9.2  8.7  8.6      Recent Labs      17   ALTSGPT  38   --    --    ASTSGOT  32   --    --    ALKPHOSPHAT  57   --    --    TBILIRUBIN  1.2   --    --    GLUCOSE  180*  163*  120*     Recent Labs      17   16317   RBC  6.63*   < >  5.59  5.98  5.65   HEMOGLOBIN  12.4*   < >  10.5*  11.2*  10.6*   HEMATOCRIT  42.5   < >  36.3*  38.6*  36.5*   PLATELETCT  1011*   < >  966*  914*  885*   PROTHROMBTM  21.1*   --    --    --    --    APTT  35.7   --    --    --    --    INR  1.76*   --    --    --    --     < > = values in this interval not displayed.     Recent Labs      17   WBC  19.9*   < >  12.5*  12.0*  9.7   NEUTSPOLYS  93.40*   --    --    --    --    LYMPHOCYTES  2.30*   --    --    --    --    MONOCYTES  2.30   --    --    --    --    EOSINOPHILS  0.70   --    --    --    --    BASOPHILS  0.60   --    --    --    --    ASTSGOT  32   --    --    --    --    ALTSGPT  38   --    --    --    --    ALKPHOSPHAT  57   --    --    --    --    TBILIRUBIN  1.2   --    --    --    --     < > = values in this interval not displayed.     Recent Labs      17   SODIUM  133*  137  139   POTASSIUM  3.8  3.7  3.6   CHLORIDE  103  111  112   CO2  21  17*  20   GLUCOSE  180*  163*  120*   BUN  18  17  16   CREATININE  1.37  1.13  0.96   CALCIUM  9.2  8.7  8.6     Hemodynamics:  Temp (24hrs), Av.4 °C (97.5 °F), Min:36.1 °C (97 °F), Max:36.9 °C (98.5 °F)  Temperature: 36.1 °C (97 °F)  Pulse  Av  Min: 45  Max: 96   Blood Pressure: 127/79 mmHg     Respiratory:    Respiration: 19, Pulse Oximetry: 96 %        RUL Breath Sounds: Clear, RML Breath Sounds: Clear, RLL Breath Sounds: Diminished, EVELINA Breath Sounds: Clear, LLL Breath Sounds: Diminished  Fluids:    Intake/Output Summary (Last 24 hours) at 17 1231  Last data filed at  03/18/17 1100   Gross per 24 hour   Intake    480 ml   Output   2800 ml   Net  -2320 ml     Weight: 102.6 kg (226 lb 3.1 oz)  GI/Nutrition:  Orders Placed This Encounter   Procedures   • Diet Order     Standing Status: Standing      Number of Occurrences: 1      Standing Expiration Date:      Order Specific Question:  Diet:     Answer:  Cardiac [6]     Order Specific Question:  Diet:     Answer:  Diabetic [3]     Medical Decision Making, by Problem:  Active Hospital Problems    Diagnosis   • *Thrombocytosis (CMS-HCC) [D47.3]   • History of ST elevation myocardial infarction (STEMI) [I25.2]   • Coagulopathy (CMS-HCC) [D68.9]   • Polycythemia [D75.1]   • CAD (coronary artery disease) [I25.10]   • Hyponatremia [E87.1]   • Paroxysmal atrial fibrillation (CMS-HCC) [I48.0]   • DM2 (diabetes mellitus, type 2) (CMS-HCC) [E11.9]       Plan:  1.  MPD:  Calos 2 + by history.  His platelets are trending down at 885k.  I would continue with 2 g hydrea daily.  Daily cbc.  Our goal is for platelet count 400k or less and hemoglobin <45.     I think if he gets <800k then can consider transitioning from Heme perspective to outpatient CBC at [facility] or [facility] (Dr Flores) depending on what he is planning on doing.  He will need at least twice weekly for awhile.    Dr Donahue to take over service starting Monday.    High risk medicine/ Moderate complexity.> 20 min discussed with primary team/ nursing as well.  Labs reviewed and Medications reviewed

## 2017-03-19 NOTE — CARE PLAN
Problem: Safety  Goal: Will remain free from falls  Outcome: PROGRESSING AS EXPECTED  Pt up self and wearing treaded slipper socks.    Problem: Venous Thromboembolism (VTW)/Deep Vein Thrombosis (DVT) Prevention:  Goal: Patient will participate in Venous Thrombosis (VTE)/Deep Vein Thrombosis (DVT)Prevention Measures  Outcome: PROGRESSING AS EXPECTED  Pt receiving eliquis for VTE and ambulates appropriately.

## 2017-03-19 NOTE — PROGRESS NOTES
"Cardiology Progress Note               Author: Casey Pena Date & Time created: 3/19/2017  9:18 AM     Interval History:        Consultation for elevated troponin(recent acute MI status post cath on 3/11/17), EKG anterior Q waves, normal sinus rhythm, no acute changes      Admitted with hypotension, \"SIRS \", (complaining of weakness, headache, body aches), leukocytosis, WBC 19.9, platelet count of 1000, CRP 3.9, lactic acid 3.9      History of polycythemia vera & thrombocytosis  (2008, diagnosed  in the setting of acute MI), recurrent MIs (2008, 2010, 2017) previous history of stenting LAD and circumflex, atrial fib on ELiquis , diabetes, hyperlipidemia, CVA in 2016 (following a diagnosis of atrial fib), iron deficiency related to thrombophlebitis B    Labs reviewed  H/H=10/36  XUZ=797  Na, K, CR stable    BP =127/79  HR =50      Left heart catheter on 3/11/17 showed thrombosed the proximal LAD stents, stent to proximal LAD and mid and distal LAD (aspiration thrombectomy)      Echocardiogram on 3/12/17 showed LVEF 45%, septal wall akinesis, no significant valvular disease    Review of Systems   Respiratory: Negative for cough and shortness of breath.    Cardiovascular: Negative for chest pain, palpitations, orthopnea, claudication, leg swelling and PND.   Psychiatric/Behavioral:        Concern about go and inability to void       Physical Exam   Constitutional: He is oriented to person, place, and time. He appears well-developed.   HENT:   Head: Normocephalic.   Eyes: Conjunctivae are normal.   Neck: No JVD present. No thyromegaly present.   Cardiovascular: Normal rate and regular rhythm.    Pulses:       Carotid pulses are 2+ on the right side, and 2+ on the left side.       Radial pulses are 2+ on the right side, and 2+ on the left side.        Posterior tibial pulses are 2+ on the right side, and 2+ on the left side.   Pulmonary/Chest: He has no wheezes.   Abdominal: Soft.   Genitourinary:   go "   Musculoskeletal: He exhibits no edema.   Neurological: He is alert and oriented to person, place, and time.   Very grateful of the care in [facility], not willing to go back to [place]   Skin: Skin is warm and dry.   R radial C/D/I    Fluid to R dorsal arm by US of UE, on ABX       Hemodynamics:  Temp (24hrs), Av.4 °C (97.6 °F), Min:36.1 °C (97 °F), Max:36.9 °C (98.5 °F)  Temperature: 36.1 °C (97 °F)  Pulse  Av.3  Min: 45  Max: 96   Blood Pressure: 127/79 mmHg     Respiratory:    Respiration: 18, Pulse Oximetry: 96 %        RUL Breath Sounds: Clear, RML Breath Sounds: Diminished, RLL Breath Sounds: Diminished, EVELINA Breath Sounds: Clear, LLL Breath Sounds: Diminished  Fluids:  Date 17 0700 - 17 0659   Shift 7029-8854 8956-2055 5039-7763 24 Hour Total   I  N  T  A  K  E   Shift Total       O  U  T  P  U  T   Urine 2600   2600    Shift Total 2600   2600   Weight (kg) 102.6 102.6 102.6 102.6       Weight: 102.6 kg (226 lb 3.1 oz)  GI/Nutrition:  Orders Placed This Encounter   Procedures   • Diet Order     Standing Status: Standing      Number of Occurrences: 1      Standing Expiration Date:      Order Specific Question:  Diet:     Answer:  Cardiac [6]     Order Specific Question:  Diet:     Answer:  Diabetic [3]     Lab Results:  Recent Labs      17   0147  17   0903  17   0311   WBC  12.5*  12.0*  9.7   RBC  5.59  5.98  5.65   HEMOGLOBIN  10.5*  11.2*  10.6*   HEMATOCRIT  36.3*  38.6*  36.5*   MCV  64.9*  64.5*  64.6*   MCH  18.8*  18.7*  18.8*   MCHC  28.9*  29.0*  29.0*   RDW  45.3  44.4  44.4   PLATELETCT  966*  914*  885*   MPV  9.6  9.1  9.4     Recent Labs      17   1630  17   0147  17   0311   SODIUM  133*  137  139   POTASSIUM  3.8  3.7  3.6   CHLORIDE  103  111  112   CO2  21  17*  20   GLUCOSE  180*  163*  120*   BUN  18  17  16   CREATININE  1.37  1.13  0.96   CALCIUM  9.2  8.7  8.6     Recent Labs      17   1630   APTT  35.7   INR  1.76*     Recent  Labs      03/16/17   1630  03/17/17   0545   BNPBTYPENAT  104*  103*     Recent Labs      03/16/17   1630  03/17/17   0303  03/17/17   0545  03/17/17   0709   CPKTOTAL  21   --    --    --    TROPONINI  2.66*  3.45*   --   2.67*   BNPBTYPENAT  104*   --   103*   --              Medical Decision Making, by Problem:  Active Hospital Problems    Diagnosis   • *Thrombocytosis (CMS-HCC) [D47.3]   • History of ST elevation myocardial infarction (STEMI) [I25.2]   • Coagulopathy (CMS-HCC) [D68.9]   • Polycythemia [D75.1]   • CAD (coronary artery disease) [I25.10]   • Hyponatremia [E87.1]   • Paroxysmal atrial fibrillation (CMS-HCC) [I48.0]   • DM2 (diabetes mellitus, type 2) (CMS-HCC) [E11.9]       Plan:   HX of PAF , maintaining NSR-SB, On ELiquis, bradycardia overnight with 1.9 second pause, currently NSR  CAD, recent anterior MI s/p L heart cath 3/11/7( OSWALD to proximal, mid, and distal LAD ), stable, no issues, denies pain,   On ASA, Effient , allergy to statin, BB was initated    Polycythemia vera & thrombocytosis, Hydrea 2000 mg daily, platelet = 880  Ischemic cardiomyopathy, LVEF 45%, on Toprol-XL 25,  SS=828/79  Remains on Florinef  IN VS infusing at 75 cc/ h  No HF symptoms  Will D/W Dr. Lake   Medications reviewed and Labs reviewed

## 2017-03-19 NOTE — PROGRESS NOTES
HOSPITALIST PROGRESS NOTE (SOAP)   Date of Service  3/19  CHIEF COMPLAINT  66 y.o.yo male presented 3/16/2017 with Weakness; Body Aches; and Headache    SUBJECTIVE  PLTs to 800K. Not complaining of aches. He did say did not want to go to [place]. He had questions.  PHYSICAL EXAM  Filed Vitals:    03/19/17 0400 03/19/17 0730 03/19/17 1117 03/19/17 1200   BP: 130/82 127/79  121/82   Pulse: 45 52 87 84   Temp: 36.2 °C (97.2 °F) 36.1 °C (97 °F)  36.2 °C (97.2 °F)   Resp: 16 18 19 16   Height:       Weight:       SpO2: 96% 96%  96%       Intake/Output Summary (Last 24 hours) at 03/19/17 1644  Last data filed at 03/19/17 1600   Gross per 24 hour   Intake   1230 ml   Output   3550 ml   Net  -2320 ml   Vitals reviewed  General/Constitutional: No acute distress.    Head: Normocephalic, atraumatic  ENT: Oral mucosa is moist. No obvious pharyngeal exudates  Eyes: Pink conjunctiva, no scleral icterus  Neck: Supple, no lymphadenopathy  Cardiovascular: Normal rate and regular rhythm. S1,2 noted. No murmurs, gallops or rubs.  Pulmonary: Clear to auscultation bilaterally. No wheezes, rales or rhonchi  Abdominal: Soft, nontender, not particularly distended to me, bowel sounds normoactive.  Musculoskeletal: No tenderness to palpation of chest wall.  Neurologic: Grossly nonfocal, moving all extremities.  Genitourinary: No gross hematuria  Skin: No obvious rash.  Psychiatric: Pleasant, cooperative.  LABS  Lab Results   Component Value Date/Time    WBC 9.7 03/19/2017 03:11 AM    RBC 5.65 03/19/2017 03:11 AM    HEMOGLOBIN 10.6* 03/19/2017 03:11 AM    HEMATOCRIT 36.5* 03/19/2017 03:11 AM    MCV 64.6* 03/19/2017 03:11 AM    MCH 18.8* 03/19/2017 03:11 AM    MCHC 29.0* 03/19/2017 03:11 AM    MPV 9.4 03/19/2017 03:11 AM    NEUTROPHILS-POLYS 93.40* 03/16/2017 04:30 PM    LYMPHOCYTES 2.30* 03/16/2017 04:30 PM    MONOCYTES 2.30 03/16/2017 04:30 PM    EOSINOPHILS 0.70 03/16/2017 04:30 PM    BASOPHILS 0.60 03/16/2017 04:30 PM    HYPOCHROMIA 1+  03/16/2017 04:30 PM    ANISOCYTOSIS 1+ 03/16/2017 04:30 PM      Lab Results   Component Value Date/Time    SODIUM 139 03/19/2017 03:11 AM    POTASSIUM 3.6 03/19/2017 03:11 AM    CHLORIDE 112 03/19/2017 03:11 AM    CO2 20 03/19/2017 03:11 AM    GLUCOSE 120* 03/19/2017 03:11 AM    BUN 16 03/19/2017 03:11 AM    CREATININE 0.96 03/19/2017 03:11 AM      Lab Results   Component Value Date/Time    PT 21.1* 03/16/2017 04:30 PM    INR 1.76* 03/16/2017 04:30 PM      BLOOD CULTURE   Date Value Ref Range Status   03/16/2017   Preliminary    No Growth    Note: Blood cultures are incubated for 5 days and  are monitored continuously.Positive blood cultures  are called to the RN and reported as soon as  they are identified.       Labs reviewed  Imaging reviewed  Medications reviewed  ASSESSMENT and PLAN  Thrombocytosis (CMS-HCC)    Systemic inflammatory response syndrome    Bronchitis  Active Problems:    Polycythemia    Coagulopathy (CMS-HCC)      Discussed with Dr. Sims, Oncology. Noted thrombotic AND bleeding complications with this. Getting hydroxyurea. Platelets to be trended.Because of recent STEMI, he is on  antiplatelets. However, there is a risk of von Willebrands disease and bleeding with aspirin. He is currently on that for his STEMI. A ristocetin cofactor can be obtained as if that is less than 30%, risk of bleeding diathesis with aspirin. Low dose aspirin is acceptable if that is greater than 30%. I will try to obtain that. However I will leave it up to Dr. Sims to make recommendations regarding the aspirin and will have Dr. Lake, Cardiology review that recommendation. Other risks include transformation to leukemia or a myelodysplastic syndrome.  Plan is to reduce PLTs to about 400K and Hct less than 45. I was called by Dr. Griffin from [place] and spent time discussing the case, chart review for possible transfer. At this time, Francesco Schulte declines transfer. Meanwhile, he has not much respiratory symptoms so  will he will complete 5 days of IV antibiotics tomorrow.    History of ST elevation myocardial infarction (STEMI)    Paroxysmal atrial fibrillation (CMS-HCC)    CAD (coronary artery disease)  Currently sinus. Dr. Lake, Cardiology consulted. Elevated troponins likely due to the recent STEMI he had and not consistent with ACS. Brilinta switched to Prasugrel, Eliquis and aspirin to be continued. See above about comments on aspirin.    Hyponatremia  Mild at best. Trend Na.    DM2 (diabetes mellitus, type 2) (CMS-HCC)  Continue sliding scale insulin and glipizide    On anticoagulation to cover for DVT prophylaxis; however defer to Dr. Sims, Hematology for management of coagulopathy.    I spent 40 minutes, reviewing the chart, notes, vitals, labs, imaging, ordering labs, evaluating Francesco Schulte for assessment, enacting the plan above and writing this note. 50% of the time was spent in counseling Francesco Schulte andanswering questions.

## 2017-03-19 NOTE — PROGRESS NOTES
Assumed pt care at 1900. Received bedside report from RN. PM assessment completed. AAOx4. Pt denies SOB; c/o pain of 0 on 0 to 10 pain scale (Will administer medication PRN - see MAR). Provided pt with RN and CNA extension numbers on white board and encouraged pt to call when needed. Discussed plan of care for the night, pt verbalizes understanding. VSS. Denies any additional needs at this time. Call light, belongings, and phone within reach. Hourly rounding in effect.

## 2017-03-20 LAB
ANION GAP SERPL CALC-SCNC: 8 MMOL/L (ref 0–11.9)
ANISOCYTOSIS BLD QL SMEAR: NORMAL
BASOPHILS # BLD AUTO: 5.3 % (ref 0–1.8)
BASOPHILS # BLD: 0.41 K/UL (ref 0–0.12)
BUN SERPL-MCNC: 17 MG/DL (ref 8–22)
BURR CELLS BLD QL SMEAR: NORMAL
CALCIUM SERPL-MCNC: 8.7 MG/DL (ref 8.5–10.5)
CHLORIDE SERPL-SCNC: 108 MMOL/L (ref 96–112)
CO2 SERPL-SCNC: 20 MMOL/L (ref 20–33)
CREAT SERPL-MCNC: 0.92 MG/DL (ref 0.5–1.4)
EKG IMPRESSION: NORMAL
EOSINOPHIL # BLD AUTO: 0 K/UL (ref 0–0.51)
EOSINOPHIL NFR BLD: 0 % (ref 0–6.9)
ERYTHROCYTE [DISTWIDTH] IN BLOOD BY AUTOMATED COUNT: 44.4 FL (ref 35.9–50)
GFR SERPL CREATININE-BSD FRML MDRD: >60 ML/MIN/1.73 M 2
GLUCOSE BLD-MCNC: 103 MG/DL (ref 65–99)
GLUCOSE BLD-MCNC: 164 MG/DL (ref 65–99)
GLUCOSE BLD-MCNC: 211 MG/DL (ref 65–99)
GLUCOSE BLD-MCNC: 211 MG/DL (ref 65–99)
GLUCOSE SERPL-MCNC: 172 MG/DL (ref 65–99)
HCT VFR BLD AUTO: 38.6 % (ref 42–52)
HGB BLD-MCNC: 11 G/DL (ref 14–18)
LG PLATELETS BLD QL SMEAR: NORMAL
LYMPHOCYTES # BLD AUTO: 0.75 K/UL (ref 1–4.8)
LYMPHOCYTES NFR BLD: 9.7 % (ref 22–41)
MANUAL DIFF BLD: ABNORMAL
MCH RBC QN AUTO: 18.6 PG (ref 27–33)
MCHC RBC AUTO-ENTMCNC: 28.5 G/DL (ref 33.7–35.3)
MCV RBC AUTO: 65.3 FL (ref 81.4–97.8)
MICROCYTES BLD QL SMEAR: NORMAL
MONOCYTES # BLD AUTO: 0.07 K/UL (ref 0–0.85)
MONOCYTES NFR BLD AUTO: 0.9 % (ref 0–13.4)
MORPHOLOGY BLD-IMP: NORMAL
NEUTROPHILS # BLD AUTO: 6.48 K/UL (ref 1.82–7.42)
NEUTROPHILS NFR BLD: 84.1 % (ref 44–72)
OVALOCYTES BLD QL SMEAR: NORMAL
PLATELET # BLD AUTO: 906 K/UL (ref 164–446)
PLATELET BLD QL SMEAR: NORMAL
PMV BLD AUTO: 9.8 FL (ref 9–12.9)
POIKILOCYTOSIS BLD QL SMEAR: NORMAL
POTASSIUM SERPL-SCNC: 3.6 MMOL/L (ref 3.6–5.5)
RBC # BLD AUTO: 5.91 M/UL (ref 4.7–6.1)
RBC BLD AUTO: PRESENT
SODIUM SERPL-SCNC: 136 MMOL/L (ref 135–145)
TOXIC GRANULES BLD QL SMEAR: SLIGHT
VWF:RCO ACT/NOR PPP PL AGG: 110 % (ref 51–215)
WBC # BLD AUTO: 7.7 K/UL (ref 4.8–10.8)

## 2017-03-20 PROCEDURE — 770020 HCHG ROOM/CARE - TELE (206)

## 2017-03-20 PROCEDURE — 85027 COMPLETE CBC AUTOMATED: CPT

## 2017-03-20 PROCEDURE — 82962 GLUCOSE BLOOD TEST: CPT | Mod: 91

## 2017-03-20 PROCEDURE — A9270 NON-COVERED ITEM OR SERVICE: HCPCS | Performed by: INTERNAL MEDICINE

## 2017-03-20 PROCEDURE — 93010 ELECTROCARDIOGRAM REPORT: CPT | Performed by: INTERNAL MEDICINE

## 2017-03-20 PROCEDURE — 99233 SBSQ HOSP IP/OBS HIGH 50: CPT | Performed by: INTERNAL MEDICINE

## 2017-03-20 PROCEDURE — 700111 HCHG RX REV CODE 636 W/ 250 OVERRIDE (IP): Performed by: INTERNAL MEDICINE

## 2017-03-20 PROCEDURE — 700102 HCHG RX REV CODE 250 W/ 637 OVERRIDE(OP): Performed by: INTERNAL MEDICINE

## 2017-03-20 PROCEDURE — 85007 BL SMEAR W/DIFF WBC COUNT: CPT

## 2017-03-20 PROCEDURE — 80048 BASIC METABOLIC PNL TOTAL CA: CPT

## 2017-03-20 PROCEDURE — 36415 COLL VENOUS BLD VENIPUNCTURE: CPT

## 2017-03-20 PROCEDURE — A9270 NON-COVERED ITEM OR SERVICE: HCPCS | Performed by: HOSPITALIST

## 2017-03-20 PROCEDURE — 93005 ELECTROCARDIOGRAM TRACING: CPT | Performed by: INTERNAL MEDICINE

## 2017-03-20 PROCEDURE — 700105 HCHG RX REV CODE 258: Performed by: INTERNAL MEDICINE

## 2017-03-20 PROCEDURE — 700102 HCHG RX REV CODE 250 W/ 637 OVERRIDE(OP): Performed by: HOSPITALIST

## 2017-03-20 PROCEDURE — 51798 US URINE CAPACITY MEASURE: CPT

## 2017-03-20 RX ORDER — MORPHINE SULFATE 4 MG/ML
2 INJECTION, SOLUTION INTRAMUSCULAR; INTRAVENOUS ONCE
Status: COMPLETED | OUTPATIENT
Start: 2017-03-20 | End: 2017-03-20

## 2017-03-20 RX ADMIN — TAMSULOSIN HYDROCHLORIDE 0.4 MG: 0.4 CAPSULE ORAL at 09:02

## 2017-03-20 RX ADMIN — INSULIN LISPRO 3 UNITS: 100 INJECTION, SOLUTION INTRAVENOUS; SUBCUTANEOUS at 18:50

## 2017-03-20 RX ADMIN — TICAGRELOR 90 MG: 90 TABLET ORAL at 20:43

## 2017-03-20 RX ADMIN — FINASTERIDE 5 MG: 5 TABLET, FILM COATED ORAL at 09:01

## 2017-03-20 RX ADMIN — ASPIRIN 81 MG: 81 TABLET, CHEWABLE ORAL at 09:01

## 2017-03-20 RX ADMIN — COLESEVELAM HYDROCHLORIDE 625 MG: 625 TABLET, FILM COATED ORAL at 08:03

## 2017-03-20 RX ADMIN — APIXABAN 5 MG: 5 TABLET, FILM COATED ORAL at 09:01

## 2017-03-20 RX ADMIN — COLESEVELAM HYDROCHLORIDE 625 MG: 625 TABLET, FILM COATED ORAL at 18:58

## 2017-03-20 RX ADMIN — SODIUM CHLORIDE: 9 INJECTION, SOLUTION INTRAVENOUS at 05:59

## 2017-03-20 RX ADMIN — INSULIN LISPRO 3 UNITS: 100 INJECTION, SOLUTION INTRAVENOUS; SUBCUTANEOUS at 13:11

## 2017-03-20 RX ADMIN — METOPROLOL SUCCINATE 25 MG: 25 TABLET, EXTENDED RELEASE ORAL at 10:29

## 2017-03-20 RX ADMIN — GLIPIZIDE 10 MG: 5 TABLET ORAL at 20:42

## 2017-03-20 RX ADMIN — INSULIN GLARGINE 20 UNITS: 100 INJECTION, SOLUTION SUBCUTANEOUS at 22:26

## 2017-03-20 RX ADMIN — MORPHINE SULFATE 2 MG: 4 INJECTION INTRAVENOUS at 17:24

## 2017-03-20 RX ADMIN — TICAGRELOR 180 MG: 90 TABLET ORAL at 10:37

## 2017-03-20 RX ADMIN — HYDROXYUREA 2000 MG: 500 CAPSULE ORAL at 18:57

## 2017-03-20 RX ADMIN — TAMSULOSIN HYDROCHLORIDE 0.4 MG: 0.4 CAPSULE ORAL at 20:42

## 2017-03-20 RX ADMIN — APIXABAN 5 MG: 5 TABLET, FILM COATED ORAL at 20:42

## 2017-03-20 ASSESSMENT — PAIN SCALES - GENERAL
PAINLEVEL_OUTOF10: 0
PAINLEVEL_OUTOF10: 0

## 2017-03-20 NOTE — PROGRESS NOTES
HOSPITALIST PROGRESS NOTE (SOAP)   Date of Service  3/20  CHIEF COMPLAINT  66 y.o.yo male presented 3/16/2017 with Weakness; Body Aches; and Headache    SUBJECTIVE  PLTs 900. Currently not experiencing chest pain or shortness of breath.  PHYSICAL EXAM  Filed Vitals:    03/20/17 0402 03/20/17 0800 03/20/17 1029 03/20/17 1200   BP: 148/84 158/80 131/69 137/85   Pulse: 52 58 76 66   Temp: 36.4 °C (97.5 °F) 36.6 °C (97.9 °F)  36.3 °C (97.3 °F)   Resp: 18 16  16   Height:       Weight:       SpO2: 95% 98%  99%       Intake/Output Summary (Last 24 hours) at 03/20/17 1559  Last data filed at 03/20/17 1300   Gross per 24 hour   Intake   1470 ml   Output   3750 ml   Net  -2280 ml   Vitals reviewed  General/Constitutional: No acute distress.    Head: Normocephalic, atraumatic  ENT: Oral mucosa is moist. No obvious pharyngeal exudates  Eyes: Pink conjunctiva, no scleral icterus  Neck: Supple, no lymphadenopathy  Cardiovascular: Normal rate and regular rhythm. S1,2 noted. No murmurs, gallops or rubs.  Pulmonary: Clear to auscultation bilaterally. No wheezes, rales or rhonchi  Abdominal: Soft, nontender, not particularly distended to me, bowel sounds normoactive.  Musculoskeletal: No tenderness to palpation of chest wall.  Neurologic: Grossly nonfocal, moving all extremities.  Genitourinary: No gross hematuria  Skin: No obvious rash.  Psychiatric: Pleasant, cooperative.    LABS  Lab Results   Component Value Date/Time    WBC 7.7 03/20/2017 01:56 AM    RBC 5.91 03/20/2017 01:56 AM    HEMOGLOBIN 11.0* 03/20/2017 01:56 AM    HEMATOCRIT 38.6* 03/20/2017 01:56 AM    MCV 65.3* 03/20/2017 01:56 AM    MCH 18.6* 03/20/2017 01:56 AM    MCHC 28.5* 03/20/2017 01:56 AM    MPV 9.8 03/20/2017 01:56 AM    NEUTROPHILS-POLYS 84.10* 03/20/2017 01:56 AM    LYMPHOCYTES 9.70* 03/20/2017 01:56 AM    MONOCYTES 0.90 03/20/2017 01:56 AM    EOSINOPHILS 0.00 03/20/2017 01:56 AM    BASOPHILS 5.30* 03/20/2017 01:56 AM    HYPOCHROMIA 1+ 03/16/2017 04:30 PM     ANISOCYTOSIS 2+ 03/20/2017 01:56 AM      Lab Results   Component Value Date/Time    SODIUM 136 03/20/2017 01:56 AM    POTASSIUM 3.6 03/20/2017 01:56 AM    CHLORIDE 108 03/20/2017 01:56 AM    CO2 20 03/20/2017 01:56 AM    GLUCOSE 172* 03/20/2017 01:56 AM    BUN 17 03/20/2017 01:56 AM    CREATININE 0.92 03/20/2017 01:56 AM      Lab Results   Component Value Date/Time    PT 21.1* 03/16/2017 04:30 PM    INR 1.76* 03/16/2017 04:30 PM      BLOOD CULTURE   Date Value Ref Range Status   03/16/2017   Preliminary    No Growth    Note: Blood cultures are incubated for 5 days and  are monitored continuously.Positive blood cultures  are called to the RN and reported as soon as  they are identified.       Labs reviewed  Imaging reviewed  Medications reviewed  ASSESSMENT and PLAN  Thrombocytosis (CMS-HCC)    Systemic inflammatory response syndrome    Bronchitis  Active Problems:    Polycythemia    Coagulopathy (CMS-HCC)      Discussed with Dr. Sims, Oncology. Noted thrombotic AND bleeding complications with this. Getting hydroxyurea. Platelets to be trended.Because of recent STEMI, he is on  antiplatelets. However, there is a risk of von Willebrands disease and bleeding with aspirin. He is currently on that for his STEMI. A ristocetin cofactor can be obtained as if that is less than 30%, risk of bleeding diathesis with aspirin. Low dose aspirin is acceptable if that is greater than 30%. I will try to obtain that. However I will leave it up to Dr. Sims to make recommendations regarding the aspirin and will have Dr. Lake, Cardiology review that recommendation. Other risks include transformation to leukemia or a myelodysplastic syndrome.  Plan is to reduce PLTs to about 400K and Hct less than 45. I was called by Dr. Griffin from [place] and spent time discussing the case, chart review for possible transfer. At this time, Francesco Schulte declines transfer. Meanwhile, he has not much respiratory symptoms so will he will complete 5  days of IV antibiotics today.Currently PLTs 900K.    History of ST elevation myocardial infarction (STEMI)    Paroxysmal atrial fibrillation (CMS-HCC)    CAD (coronary artery disease)  Currently sinus. Dr. Lake, Cardiology consulted. Elevated troponins likely due to the recent STEMI he had and not consistent with ACS. Brilinta switched to Prasugrel, Eliquis and aspirin to be continued. See above about comments on aspirin.    Hyponatremia  Mild at best. Trend Na.    DM2 (diabetes mellitus, type 2) (CMS-HCC)  Continue sliding scale insulin and glipizide    On anticoagulation to cover for DVT prophylaxis; however defer to Dr. Sims, Hematology for management of coagulopathy.    I spent 40 minutes, reviewing the chart, notes, vitals, labs, imaging, ordering labs, evaluating Francesco Schulte for assessment, enacting the plan above and writing this note. 50% of the time was spent in counseling Francesco Schulte and answering questions.

## 2017-03-20 NOTE — CARE PLAN
Problem: Safety  Goal: Will remain free from falls  Outcome: PROGRESSING AS EXPECTED  Pt up self with treaded slipper socks on. Pt calls appropriately.    Problem: Venous Thromboembolism (VTW)/Deep Vein Thrombosis (DVT) Prevention:  Goal: Patient will participate in Venous Thrombosis (VTE)/Deep Vein Thrombosis (DVT)Prevention Measures  Outcome: PROGRESSING AS EXPECTED  Pt receiving eliquis for VTE and ambulates appropriately.

## 2017-03-21 VITALS
HEIGHT: 75 IN | SYSTOLIC BLOOD PRESSURE: 142 MMHG | BODY MASS INDEX: 28.04 KG/M2 | DIASTOLIC BLOOD PRESSURE: 86 MMHG | WEIGHT: 225.53 LBS | HEART RATE: 74 BPM | TEMPERATURE: 98.1 F | RESPIRATION RATE: 18 BRPM | OXYGEN SATURATION: 99 %

## 2017-03-21 LAB
ANION GAP SERPL CALC-SCNC: 8 MMOL/L (ref 0–11.9)
BACTERIA BLD CULT: NORMAL
BACTERIA BLD CULT: NORMAL
BUN SERPL-MCNC: 16 MG/DL (ref 8–22)
CALCIUM SERPL-MCNC: 8.7 MG/DL (ref 8.5–10.5)
CHLORIDE SERPL-SCNC: 111 MMOL/L (ref 96–112)
CO2 SERPL-SCNC: 21 MMOL/L (ref 20–33)
CREAT SERPL-MCNC: 0.92 MG/DL (ref 0.5–1.4)
ERYTHROCYTE [DISTWIDTH] IN BLOOD BY AUTOMATED COUNT: 45.1 FL (ref 35.9–50)
GFR SERPL CREATININE-BSD FRML MDRD: >60 ML/MIN/1.73 M 2
GLUCOSE BLD-MCNC: 70 MG/DL (ref 65–99)
GLUCOSE SERPL-MCNC: 68 MG/DL (ref 65–99)
HCT VFR BLD AUTO: 37.7 % (ref 42–52)
HGB BLD-MCNC: 11 G/DL (ref 14–18)
MCH RBC QN AUTO: 18.9 PG (ref 27–33)
MCHC RBC AUTO-ENTMCNC: 29.2 G/DL (ref 33.7–35.3)
MCV RBC AUTO: 64.9 FL (ref 81.4–97.8)
PLATELET # BLD AUTO: 843 K/UL (ref 164–446)
PMV BLD AUTO: 9.2 FL (ref 9–12.9)
POTASSIUM SERPL-SCNC: 3.4 MMOL/L (ref 3.6–5.5)
RBC # BLD AUTO: 5.81 M/UL (ref 4.7–6.1)
SIGNIFICANT IND 70042: NORMAL
SIGNIFICANT IND 70042: NORMAL
SITE SITE: NORMAL
SITE SITE: NORMAL
SODIUM SERPL-SCNC: 140 MMOL/L (ref 135–145)
SOURCE SOURCE: NORMAL
SOURCE SOURCE: NORMAL
WBC # BLD AUTO: 7.6 K/UL (ref 4.8–10.8)

## 2017-03-21 PROCEDURE — 80048 BASIC METABOLIC PNL TOTAL CA: CPT

## 2017-03-21 PROCEDURE — A9270 NON-COVERED ITEM OR SERVICE: HCPCS | Performed by: NURSE PRACTITIONER

## 2017-03-21 PROCEDURE — A9270 NON-COVERED ITEM OR SERVICE: HCPCS | Performed by: HOSPITALIST

## 2017-03-21 PROCEDURE — 99239 HOSP IP/OBS DSCHRG MGMT >30: CPT | Performed by: INTERNAL MEDICINE

## 2017-03-21 PROCEDURE — 82962 GLUCOSE BLOOD TEST: CPT

## 2017-03-21 PROCEDURE — 36415 COLL VENOUS BLD VENIPUNCTURE: CPT

## 2017-03-21 PROCEDURE — 700102 HCHG RX REV CODE 250 W/ 637 OVERRIDE(OP): Performed by: NURSE PRACTITIONER

## 2017-03-21 PROCEDURE — A9270 NON-COVERED ITEM OR SERVICE: HCPCS | Performed by: INTERNAL MEDICINE

## 2017-03-21 PROCEDURE — 700102 HCHG RX REV CODE 250 W/ 637 OVERRIDE(OP): Performed by: HOSPITALIST

## 2017-03-21 PROCEDURE — 700105 HCHG RX REV CODE 258: Performed by: INTERNAL MEDICINE

## 2017-03-21 PROCEDURE — 85027 COMPLETE CBC AUTOMATED: CPT

## 2017-03-21 PROCEDURE — 700102 HCHG RX REV CODE 250 W/ 637 OVERRIDE(OP): Performed by: INTERNAL MEDICINE

## 2017-03-21 RX ORDER — POTASSIUM CHLORIDE 20 MEQ/1
20 TABLET, EXTENDED RELEASE ORAL ONCE
Status: COMPLETED | OUTPATIENT
Start: 2017-03-21 | End: 2017-03-21

## 2017-03-21 RX ORDER — FLUDROCORTISONE ACETATE 0.1 MG/1
0.1 TABLET ORAL EVERY MORNING
Qty: 30 TAB | Refills: 0 | Status: SHIPPED | OUTPATIENT
Start: 2017-03-21 | End: 2017-08-14

## 2017-03-21 RX ORDER — HYDROXYUREA 500 MG/1
2000 CAPSULE ORAL DAILY
Qty: 30 CAP | Refills: 0 | Status: SHIPPED | OUTPATIENT
Start: 2017-03-21 | End: 2018-03-10

## 2017-03-21 RX ORDER — POTASSIUM CHLORIDE 1.5 G/1.58G
20 POWDER, FOR SOLUTION ORAL ONCE
Status: COMPLETED | OUTPATIENT
Start: 2017-03-21 | End: 2017-03-21

## 2017-03-21 RX ORDER — HYDROCODONE BITARTRATE AND ACETAMINOPHEN 5; 325 MG/1; MG/1
1-2 TABLET ORAL EVERY 6 HOURS PRN
Qty: 10 TAB | Refills: 0 | Status: SHIPPED | OUTPATIENT
Start: 2017-03-21 | End: 2018-03-10

## 2017-03-21 RX ORDER — FAMOTIDINE 20 MG/1
20 TABLET, FILM COATED ORAL PRN
Qty: 60 TAB | Refills: 0 | Status: SHIPPED | OUTPATIENT
Start: 2017-03-21 | End: 2018-03-19

## 2017-03-21 RX ORDER — OXYBUTYNIN CHLORIDE 5 MG/1
5 TABLET ORAL 3 TIMES DAILY PRN
Qty: 90 TAB | Refills: 0 | Status: SHIPPED | OUTPATIENT
Start: 2017-03-21 | End: 2017-09-13

## 2017-03-21 RX ADMIN — ASPIRIN 81 MG: 81 TABLET, CHEWABLE ORAL at 09:03

## 2017-03-21 RX ADMIN — SODIUM CHLORIDE: 9 INJECTION, SOLUTION INTRAVENOUS at 00:51

## 2017-03-21 RX ADMIN — TAMSULOSIN HYDROCHLORIDE 0.4 MG: 0.4 CAPSULE ORAL at 09:04

## 2017-03-21 RX ADMIN — FINASTERIDE 5 MG: 5 TABLET, FILM COATED ORAL at 09:04

## 2017-03-21 RX ADMIN — POTASSIUM CHLORIDE 20 MEQ: 1500 TABLET, EXTENDED RELEASE ORAL at 11:24

## 2017-03-21 RX ADMIN — COLESEVELAM HYDROCHLORIDE 625 MG: 625 TABLET, FILM COATED ORAL at 09:00

## 2017-03-21 RX ADMIN — TICAGRELOR 90 MG: 90 TABLET ORAL at 09:04

## 2017-03-21 RX ADMIN — METOPROLOL TARTRATE 25 MG: 25 TABLET, FILM COATED ORAL at 09:04

## 2017-03-21 RX ADMIN — APIXABAN 5 MG: 5 TABLET, FILM COATED ORAL at 09:03

## 2017-03-21 ASSESSMENT — ENCOUNTER SYMPTOMS
NAUSEA: 0
CHILLS: 0
FEVER: 0
PALPITATIONS: 0
VOMITING: 0
SHORTNESS OF BREATH: 0
DIARRHEA: 0
DIZZINESS: 0
HEADACHES: 0

## 2017-03-21 ASSESSMENT — PAIN SCALES - GENERAL: PAINLEVEL_OUTOF10: 0

## 2017-03-21 NOTE — CARE PLAN
Problem: Knowledge Deficit  Goal: Knowledge of disease process/condition, treatment plan, diagnostic tests, and medications will improve  Outcome: PROGRESSING AS EXPECTED  POC discussed with pt and MD at length at bedside. All questions answered. Verbalized understanding.     Problem: Acute Care of the Heart Failure Patient  Goal: Optimal Outcome for the HF Patient  Outcome: PROGRESSING AS EXPECTED  Heart Failure education reviewed with pt. Declined heart failure packet stating he has 3 at home. All questions answered. Pt verbalized understanding and provided verbal demonstration.

## 2017-03-21 NOTE — DISCHARGE INSTRUCTIONS
Discharge Instructions    Discharged to home by car with relative. Discharged via walking, hospital escort: Refused.  Special equipment needed: Not Applicable    Be sure to schedule a follow-up appointment with your primary care doctor or any specialists as instructed.     Discharge Plan:   Diet Plan: Discussed  Activity Level: Discussed  Confirmed Follow up Appointment: Appointment Scheduled   Confirmed Symptoms Management: Discussed  Medication Reconciliation Updated: Yes  Influenza Vaccine Indication: Not indicated: Previously immunized this influenza season and > 8 years of age    I understand that a diet low in cholesterol, fat, and sodium is recommended for good health. Unless I have been given specific instructions below for another diet, I accept this instruction as my diet prescription.   Other diet: Heart healthy, Diabetic Diet     Special Instructions:   HF Patient Discharge Instructions  · Monitor your weight daily, and maintain a weight chart, to track your weight changes.   · Activity as tolerated, unless your Doctor has ordered otherwise. Other activity order: Activity as tolerated.  · Follow a low fat, low cholesterol, low salt diet unless instructed otherwise by your Doctor. Read the labels on the back of food products and track your intake of fat, cholesterol and salt.   · Fluid Restriction No. If a Fluid Restriction has been ordered by your Doctor, measure fluids with a measuring cup to ensure that you are not exceeding the restriction.   · No smoking.  · Oxygen No. If your Doctor has ordered that you wear Oxygen at home, it is important to wear it as ordered.  · Did you receive an explanation from staff on the importance of taking each of your medications and why it is necessary to keep taking them unless your doctor says to stop? Yes  · Were all of your questions answered about how to manage your heart failure and what to do if you have increased signs and symptoms after you go home? Yes  · Do  you feel like your heart failure care team involved you in the care treatment plan and allowed you to make decisions regarding your care while in the hospital and addressed any discharge needs you might have? Yes    See the educational handout provided at discharge for more information on monitoring your daily weight, activity and diet. This also explains more about Heart Failure, symptoms of a flare-up and some of the tests that you have undergone.     Warning Signs of a Flare-Up include:  · Swelling in the ankles or lower legs.  · Shortness of breath, while at rest, or while doing normal activities.   · Shortness of breath at night when in bed, or coughing in bed.   · Requiring more pillows to sleep at night, or needing to sit up at night to sleep.  · Feeling weak, dizzy or fatigued.     When to call your Doctor:  · Call [address] floor about questions regarding the discharge instructions you were given [phone].  · Call your Primary Care Physician or Cardiologist if:   1. You experience any pain radiating to your jaw or neck.  2. You have any difficulty breathing.  3. You experience weight gain of 3 lbs in a day or 5 lbs in a week.   4. You feel any palpitations or irregular heartbeats.  5. You become dizzy or lose consciousness.   If you have had an angiogram or had a pacemaker or AICD placed, and experience:  1. Bleeding, drainage or swelling at the surgical / puncture site.  2. Fever greater than 100.0 F  3. Shock from internal defibrillator.  4. Cool and / or numb extremities.      · Is patient discharged on Warfarin / Coumadin?   No     · Is patient Post Blood Transfusion?  No    Depression / Suicide Risk    As you are discharged from this [facility], [place], it is important to learn how to keep safe from harming yourself.    Recognize the warning signs:  · Abrupt changes in personality, positive or negative- including increase in energy   · Giving away possessions  · Change in eating  patterns- significant weight changes-  positive or negative  · Change in sleeping patterns- unable to sleep or sleeping all the time   · Unwillingness or inability to communicate  · Depression  · Unusual sadness, discouragement and loneliness  · Talk of wanting to die  · Neglect of personal appearance   · Rebelliousness- reckless behavior  · Withdrawal from people/activities they love  · Confusion- inability to concentrate     If you or a loved one observes any of these behaviors or has concerns about self-harm, here's what you can do:  · Talk about it- your feelings and reasons for harming yourself  · Remove any means that you might use to hurt yourself (examples: pills, rope, extension cords, firearm)  · Get professional help from the community (Mental Health, Substance Abuse, psychological counseling)  · Do not be alone:Call your Safe Contact- someone whom you trust who will be there for you.  · Call your local CRISIS HOTLINE 441-4361 or 460-367-1064  · Call your local Children's Mobile Crisis Response Team [place] (996) 498-4483 or [employer]  · Call the toll free National Suicide Prevention Hotlines   · National Suicide Prevention Lifeline 922-667-OZGO (9548)  · National Hope Line Network 800-SUICIDE (289-5992)      Metoprolol tablets  What is this medicine?  METOPROLOL (me TOE proe lole) is a beta-blocker. Beta-blockers reduce the workload on the heart and help it to beat more regularly. This medicine is used to treat high blood pressure and to prevent chest pain. It is also used to after a heart attack and to prevent an additional heart attack from occurring.  This medicine may be used for other purposes; ask your health care provider or pharmacist if you have questions.  COMMON BRAND NAME(S): Lopressor  What should I tell my health care provider before I take this medicine?  They need to know if you have any of these conditions:  -diabetes  -heart or vessel disease like slow heart rate,  worsening heart failure, heart block, sick sinus syndrome or Raynaud's disease  -kidney disease  -liver disease  -lung or breathing disease, like asthma or emphysema  -pheochromocytoma  -thyroid disease  -an unusual or allergic reaction to metoprolol, other beta-blockers, medicines, foods, dyes, or preservatives  -pregnant or trying to get pregnant  -breast-feeding  How should I use this medicine?  Take this medicine by mouth with a drink of water. Follow the directions on the prescription label. Take this medicine immediately after meals. Take your doses at regular intervals. Do not take more medicine than directed. Do not stop taking this medicine suddenly. This could lead to serious heart-related effects.  Talk to your pediatrician regarding the use of this medicine in children. Special care may be needed.  Overdosage: If you think you have taken too much of this medicine contact a poison control center or emergency room at once.  NOTE: This medicine is only for you. Do not share this medicine with others.  What if I miss a dose?  If you miss a dose, take it as soon as you can. If it is almost time for your next dose, take only that dose. Do not take double or extra doses.  What may interact with this medicine?  Do not take this medicine with any of the following medications:  -sotalol  This medicine may also interact with the following medications:  -clonidine  -digoxin  -dobutamine  -epinephrine  -isoproterenol  -medicine to control heart rhythm like quinidine, propafenone  -medicine for depression like monoamine oxidase (MAO) inhibitors, fluoxetine, and paroxetine  -medicine for high blood pressure like calcium channel blockers  -reserpine  This list may not describe all possible interactions. Give your health care provider a list of all the medicines, herbs, non-prescription drugs, or dietary supplements you use. Also tell them if you smoke, drink alcohol, or use illegal drugs. Some items may interact with  your medicine.  What should I watch for while using this medicine?  Visit your doctor or health care professional for regular check ups. Contact your doctor right away if your symptoms worsen. Check your blood pressure and pulse rate regularly. Ask your health care professional what your blood pressure and pulse rate should be, and when you should contact them.  You may get drowsy or dizzy. Do not drive, use machinery, or do anything that needs mental alertness until you know how this medicine affects you. Do not sit or stand up quickly, especially if you are an older patient. This reduces the risk of dizzy or fainting spells. Contact your doctor if these symptoms continue. Alcohol may interfere with the effect of this medicine. Avoid alcoholic drinks.  What side effects may I notice from receiving this medicine?  Side effects that you should report to your doctor or health care professional as soon as possible:  -allergic reactions like skin rash, itching or hives  -cold or numb hands or feet  -depression  -difficulty breathing  -faint  -fever with sore throat  -irregular heartbeat, chest pain  -rapid weight gain  -swollen legs or ankles  Side effects that usually do not require medical attention (report to your doctor or health care professional if they continue or are bothersome):  -anxiety or nervousness  -change in sex drive or performance  -dry skin  -headache  -nightmares or trouble sleeping  -short term memory loss  -stomach upset or diarrhea  -unusually tired  This list may not describe all possible side effects. Call your doctor for medical advice about side effects. You may report side effects to FDA at 3-663-FDA-6034.  Where should I keep my medicine?  Keep out of the reach of children.  Store at room temperature between 15 and 30 degrees C (59 and 86 degrees F). Throw away any unused medicine after the expiration date.  NOTE: This sheet is a summary. It may not cover all possible information. If you  have questions about this medicine, talk to your doctor, pharmacist, or health care provider.  © 2014, [employer]. (2/27/2009 4:11:19 PM)

## 2017-03-21 NOTE — PROGRESS NOTES
Pt to be discharged home with family. Discharge instructions given to and reviewed with pt, including when to see PCP,cardiiology and hematology. new medications, diagnosis specific education, and when to return to ER. All questions answered. Verbalized understanding. IV and monitor removed. All belongings with pt when leaving unit.

## 2017-03-21 NOTE — PROGRESS NOTES
Go cath discontinued, 10ml NS returned from cath, pt given 2mg IV morphine sulfate prior to removing cath due to pt hx of BPH and pain when removing go catheters.

## 2017-03-21 NOTE — CARE PLAN
Problem: Infection  Goal: Will remain free from infection  Outcome: PROGRESSING AS EXPECTED  Pt educated on importance of good hand hygiene and verbalized understanding.        Problem: Knowledge Deficit  Goal: Knowledge of disease process/condition, treatment plan, diagnostic tests, and medications will improve  Outcome: PROGRESSING AS EXPECTED  Discussed POC and medications with patient, pt. verbalized understanding.

## 2017-03-21 NOTE — PROGRESS NOTES
"Cardiology Progress Note               Author: Charla Tellez Date & Time created: 3/21/2017  12:40 PM     Interval History:  66 year old with HX of PV, thrombocytosis with recent acute stent thrombosis of LAD, resultant STEMI.     3/21:  125/72    Having some episodes where he is waking up \"gasping for air\"  Per pt and RN typically follows after HR goes down to 30's while sleeping  Has had this before when on lopressor      K 3.4  Trop peaked at 3.45        Transthoracic  Echo Report    Echocardiography Laboratory    CONCLUSIONS  Mild reduced left ventricular systolic function.  Left ventricular ejection fraction is visually estimated to be 45%.  Septal wall akinesis.  Right heart pressures are normal.  Aortic sclerosis without stenosis.  Mild aortic insufficiency    Review of Systems   Constitutional: Negative for fever, chills and malaise/fatigue.   Respiratory: Negative for shortness of breath.    Cardiovascular: Negative for chest pain and palpitations.   Gastrointestinal: Negative for nausea, vomiting and diarrhea.   Skin: Negative for rash.   Neurological: Negative for dizziness and headaches.       Physical Exam   Constitutional: He is oriented to person, place, and time. He appears well-developed and well-nourished. No distress.   HENT:   Head: Normocephalic and atraumatic.   Cardiovascular: Normal rate, regular rhythm and normal heart sounds.    Pulmonary/Chest: Effort normal and breath sounds normal. No respiratory distress. He has no wheezes.   Musculoskeletal: Normal range of motion.   Neurological: He is alert and oriented to person, place, and time.   Skin: Skin is warm and dry.   Psychiatric: He has a normal mood and affect. His behavior is normal. Judgment and thought content normal.   Nursing note and vitals reviewed.      Hemodynamics:  Temp (24hrs), Av.4 °C (97.6 °F), Min:36.3 °C (97.3 °F), Max:36.7 °C (98.1 °F)  Temperature: 36.7 °C (98.1 °F)  Pulse  Av  Min: 45  Max: 96   Blood " Pressure: 142/86 mmHg     Respiratory:    Respiration: 18, Pulse Oximetry: 99 %        RML Breath Sounds: Clear, RLL Breath Sounds: Diminished, EVELINA Breath Sounds: Clear, LLL Breath Sounds: Diminished  Fluids:  Date 03/21/17 0700 - 03/22/17 0659   Shift 6118-0734 5798-2411 [id number] 24 Hour Total   I  N  T  A  K  E   P.O. 240   240    Shift Total 240   240   O  U  T  P  U  T   Urine 300   300    Shift Total 300   300   Weight (kg) 102.3 102.3 102.3 102.3       Weight: 102.3 kg (225 lb 8.5 oz)  GI/Nutrition:  Orders Placed This Encounter   Procedures   • Diet Order     Standing Status: Standing      Number of Occurrences: 1      Standing Expiration Date:      Order Specific Question:  Diet:     Answer:  Cardiac [6]     Order Specific Question:  Diet:     Answer:  Diabetic [3]     Lab Results:  Recent Labs      03/19/17   0311 03/20/17   0156  03/21/17   0233   WBC  9.7  7.7  7.6   RBC  5.65  5.91  5.81   HEMOGLOBIN  10.6*  11.0*  11.0*   HEMATOCRIT  36.5*  38.6*  37.7*   MCV  64.6*  65.3*  64.9*   MCH  18.8*  18.6*  18.9*   MCHC  29.0*  28.5*  29.2*   RDW  44.4  44.4  45.1   PLATELETCT  885*  906*  843*   MPV  9.4  9.8  9.2     Recent Labs      03/19/17 0311 03/20/17   0156  03/21/17   0233   SODIUM  139  136  140   POTASSIUM  3.6  3.6  3.4*   CHLORIDE  112  108  111   CO2  20  20  21   GLUCOSE  120*  172*  68   BUN  16  17  16   CREATININE  0.96  0.92  0.92   CALCIUM  8.6  8.7  8.7                         Medical Decision Making, by Problem:  Active Hospital Problems    Diagnosis   • *Thrombocytosis (CMS-HCC) [D47.3]   • History of ST elevation myocardial infarction (STEMI) [I25.2]   • Coagulopathy (CMS-HCC) [D68.9]   • Polycythemia [D75.1]   • CAD (coronary artery disease) [I25.10]   • Hyponatremia [E87.1]   • Paroxysmal atrial fibrillation (CMS-HCC) [I48.0]   • DM2 (diabetes mellitus, type 2) (CMS-HCC) [E11.9]       Plan:  CAD:  [facility] the [facility]  Continue asa 81  Continue  eliquis (Hx of PAF)  Continue high dose statin  Continue brilanta  Change to lopressor 12.5 BId  Monitor pressures at home  Long talk with pt, recent sleep study 6 weeks ago  Ok to dc from cardiology perspective       Case and plan with Dr. Perdue      Core Measures

## 2017-03-21 NOTE — PROGRESS NOTES
Bedside report received by Day JOSLYN Oakley. Patient sitting up in bed watching TV at this time. POC discussed, verbalized understanding. No immediate concerns for patient at this time. All safety measures in place.

## 2017-03-22 ENCOUNTER — PATIENT OUTREACH (OUTPATIENT)
Dept: HEALTH INFORMATION MANAGEMENT | Facility: OTHER | Age: 67
End: 2017-03-22

## 2017-03-22 NOTE — DISCHARGE SUMMARY
DISCHARGE DIAGNOSES:  1.  Thrombocytosis.  2.  Recent history of ST-elevation myocardial infarction.  3.  History of coronary artery disease.  4.  Paroxysmal atrial fibrillation.  5.  Diabetes mellitus type 2.    CONSULTANTS:  Dr. Lake, Cardiology; Dr. Sims, Hematology.    HOSPITAL SUMMARY:  Please refer to H and P for details.  In short, the patient   is a 66-year-old male who came to ER with generalized malaise and ___.  Due   to his systemic inflammatory response syndrome, on initial encounter, the   patient was empirically started on IV antibiotics.  Due to his history of   thrombocytosis, Dr. Sims was also consulted as well.  Initially, his   thrombocytosis was found to have around 1000 range.  The patient was also   treated with hydroxyurea and his platelet count has been trending down slowly.    Today, his platelet count went down all the way to 843.  Also consulted Dr. Pretty over the phone and updated him about the patient's platelet count and he   recommended to discharge the patient with hydroxyurea 2000 mg a day and follow   up with Dr. Flores in a few days.  Also due to his recent history of STEMI   and coronary artery disease, Cardiology was consulted and recommended   aspirin, Eliquis and Prasugrel for his CAD instead of Brilinta.  His overall   plan is to have his platelet count less than 400 and hematocrit less than 45   per hematology.  Also, the patient was treated with antibiotics for his upper   respiratory symptoms and completed his antibiotics yesterday.    DISCHARGE MEDICATIONS:  Famotidine 20 mg tablet daily, Norco 5/325 mg 1-2   tablets q.6 hours p.r.n., metoprolol 25 mg daily, oxybutynin 5 mg 3 times a   day, hydroxyurea 2000 mg daily, Eliquis 5 mg b.i.d., aspirin 81 mg q day,   colesevelam 625 mg b.i.d., finasteride 5 mg daily, fludrocortisone 0.1 mg   daily, glipizide 10 mg daily, insulin glargine 20 units at bedtime, tamsulosin   0.4 mg daily.    FOLLOWUP:  Followup with PCP  in 1 week.  Followup with Dr. Flores in 1   week.  Followup with cardiology in 1-2 weeks ___.    DISCHARGE INSTRUCTIONS:  The patient was instructed to return to ER in the   event of worsening symptoms.    Time spent on discharge, 38 minutes.       ____________________________________     MD SCOOBY HORVATH / DEANNA    DD:  03/21/2017 12:34:12  DT:  03/21/2017 16:51:57    D#:  [id number]  Job#:  [id number]

## 2017-03-22 NOTE — PROGRESS NOTES
03/22/2017 1438 - Discharge Outreach attempt - LM  03/23/2017 1410 - Discharge Outreach attempt - Patient states he's running out the door now. Please call back later.  03/23/2017 1619 - Reached patient and call completed.

## 2017-03-23 ENCOUNTER — PATIENT OUTREACH (OUTPATIENT)
Dept: HEALTH INFORMATION MANAGEMENT | Facility: OTHER | Age: 67
End: 2017-03-23

## 2017-03-25 LAB — EKG IMPRESSION: NORMAL

## 2017-03-25 NOTE — PROCEDURES
DATE OF SERVICE:  03/11/2017    PROCEDURE:  Cardiac catheterization and Percutaneous Coronary Intervention.  A.  Left heart catheterization.  B.  Left ventriculography.  C.  Selective coronary angiography.  D.  Coronary aspiration thrombectomy of the left anterior descending artery.  E.  Coronary stent implantation of the proximal left anterior descending   artery with a 4.0x8 mm drug-eluting Alpine Xience stent for in-stent   restenosis.  F.  Coronary stent implantation of the mid left anterior descending artery   with a 2.5x38 mm drug-eluting Xience Alpine stent post-dilated to 3.0 mm.  G.  Right radial artery approach.    PREOPERATIVE DIAGNOSES:  1.  ST elevation myocardial infarction, anterior.  2.  Coronary artery disease with previous myocardial infarction and coronary   intervention in 2008 and 2010.  3.  Paroxysmal atrial fibrillation.  4.  Previous stroke, December 2016.  5.  Hyperlipidemia.  6.  Polycythemia rubra vera with thrombocytopenia.  7.  Diabetes mellitus.    POSTPROCEDURE DIAGNOSES:  1.  Subacute stent thrombosis.  2.  In-stent restenosis of the proximal left anterior descending artery stent.  3.  Severe diffuse obstructive coronary artery disease of the mid left anterior   descending artery.    PHYSICIAN:  Neptali Watkins MD    REFERRING PHYSICIAN:  Adin Neil MD    COMPLICATIONS:  None.    MEDICATIONS:  1.  Versed 1 mg IV.  2.  Fentanyl 50 mcg IV.  3.  Lidocaine 2% subcutaneous.  4.  Heparin 3000 units IA.  5.  Nitroglycerin 100 mcg IA.  6.  Verapamil 2.5 mg IA.  7.  Angiomax IV bolus and infusion.  8.  Integrilin IV bolus and infusion.  9.  Levophed infusion.  10.  Zofran 1 mg IV.    INDICATIONS:  A 66-year-old with a complex history of medical problems and   cardiovascular disease including previous myocardial infarctions in 2008 and 2010   with coronary stent implantation with subsequent stent thrombosis, paroxysmal   atrial fibrillation with CVA in December 2016 requiring  anticoagulation with   Eliquis, polycythemia rubra vera, hyperlipidemia and diabetes mellitus who   presents with an acute ST-elevation anterior myocardial infarction.    The patient is referred for an urgent cardiac catheterization with   consideration of therapeutic coronary intervention.    DESCRIPTION OF PROCEDURE:  After informed consent was obtained, the patient   was brought immediately to the cardiac catheterization laboratory.    Patient was prepped, draped and anesthetized in the usual manner.    Using a modified Seldinger technique, a 6-Maltese x 10 cm introducer sheath was   inserted in the right radial artery.  Heparin, verapamil, and nitroglycerin   were given via the side port.    Next, a 6-Maltese JR 4.0 right coronary catheter was inserted in the ostium of   the right coronary artery and right coronary angiograms were obtained in   various projections.    Next, a 6-Maltese JL 4.0 and a 6-Maltese JL 3.5 left coronary catheter was used   to obtain left coronary angiograms in various projections.    A 4-Maltese pigtail catheter was inserted in the left ventricle under   fluoroscopic guidance.  A single plane MCFARLAND left ventricular angiogram was   performed.  Pre and post-angiogram LVEDP, LV and aortic pressures were   obtained.    IV Angiomax was given after insertion of the right radial artery catheter.    Levophed was initiated for low blood pressure of 90 systolic.    IV Integrilin was initiated.    Initial angiograms demonstrated diffuse in-stent thrombosis of the proximal   left anterior descending artery stent and severe diffuse disease of the mid   left anterior descending artery.  It was elected to proceed with coronary   intervention of the left anterior descending artery.    Next, a 6-Maltese extra backup 4.0 guiding catheter was inserted in the ostium   of the left coronary artery.    Next, a 0.014 balanced middleweight guidewire was advanced to the distal left   anterior descending artery.    A  6-Tamazight PriorityOne aspiration catheter was used to perform a aspiration   thrombectomy of the proximal left anterior descending artery stent.    Next, a 3.5x20 mm balloon catheter was inserted within the proximal LAD stent   and inflated up to 12 atmospheres.  The balloon was removed.    Next, a 3.75x20 mm noncompliant balloon catheter was inserted within the   proximal LAD stent and inflated up to 15 atmospheres and the balloon was   removed.    Next, a 4.0x12 mm noncompliant balloon catheter was inserted within the stent   and inflated up to 14 atmospheres.    Next, a 2.0x20 mm balloon catheter was inserted in the mid LAD and inflated up   to 13 atmospheres.    Next, a 2.5x30 mm drug-eluting Xience stent was placed across the mid LAD   disease and inflated up to 16 atmospheres.    Next, a 2.75x15 mm noncompliant balloon catheter was inserted within the stent   and serial inflations of 15 atmospheres were performed.    Next, a 3.5x15 mm noncompliant balloon catheter was inserted within the   stented segment and inflations of 12 atmospheres and 14 atmospheres were   performed throughout the stented segment.    Next, a 4.0x8 mm drug-eluting Xience stent was placed within the proximal LAD   stent at a level of residual stenosis and deployed at 18 atmospheres.  Balloon   and wire were removed and final angiograms were performed.    At the end of the procedure, catheters were removed.  Hemostasis was achieved   with a wrist band device.  Patient tolerated the procedure well.    FINDINGS:  HEMODYNAMICS:  LEFT HEART PRESSURES:  1.  LVEDP of 23.  2.  Left ventricular systolic pressure 95.  3.  Central aortic pressure systolic, 94, diastolic 52, mean of 69.    LEFT VENTRICULOGRAPHY:  Left ventricular chamber size is normal.  There is   hypokinesis of the distal anterior wall, anterior apex and inferior apex.    Calculated ejection fraction is 42%.  There was mild dyskinesis of the apex.    No identifiable intracardiac  thrombus.    CORONARY ARTERIOGRAPHY:  1.  Right coronary artery:  The RCA is a large caliber vessel, gives rise to   an acute marginal branch, posterior descending artery, and a large   posterolateral system.  The right coronary artery is widely patent with   diffuse mild smooth diffuse atheroma.  2.  Left main artery:  Left main artery is a moderate caliber vessel that is   widely patent with mild diffuse smooth atheroma.  The left main artery   trifurcates into the left anterior descending artery, a ramus intermedius   vessel and the circumflex artery.  3.  Left anterior descending artery:  The LAD gives rise to a normal   complement of septal branches and a diagonal branch and extends around the   apex.  Proximal LAD demonstrates a long stented segment.  The LAD stent has a   focal 99% stenosis with filling defect indictative of thrombus and the distal   portion of the stent has a 60% stenosis.  Distal LAD has a long severely   diffuse diseased segment with up to 95% stenosis.  The diagonal branch,   which is jailed by the previous stent has an ostial irregular 60% stenosis.    The terminal portion of the LAD has severe diffuse disease of up to 80-90%.   There is PHIL 2 antegrade flow into the distal LAD.  4.  Circumflex artery:  The circumflex consists of a single moderate-sized   caliber marginal branch.  Circumflex ostium has a 60% stenosis.  5.  Ramus intermedius:  The ramus is a small caliber vessel.  The ramus ostium   has an 80% stenosis with diffuse significant proximal disease.    POST-STENT IMPLANTATION of the mid left anterior descending artery with a   2.5x38 mm drug-eluting Xience stent post-dilated to 3.0 mm demonstrates good   stent expansion, with residual narrowing of less than 10% with no evidence of   dissection or thrombus.    POST-STENT IMPLANTATION of the distal LAD stent with a 4.0x8 mm   drug-eluting Xience stent demonstrates good stent expansion, 0% residual   stenosis, no evidence of  dissection or thrombus with restoration of PHIL 3   antegrade flow.      CONCLUSION:  1.  EF of 42% with anterior apical hypokinesis and dyskinesis.  2.  Proximal LAD subacute stent thrombosis.  3.  Mid LAD 3.0x38 mm OSWALD #4, proximal LAD, in-stent restenosis with a 4.0x8   mm drug-eluting stent.       ____________________________________     MD KRISTOPHER BUENO / DEANNA    DD:  03/25/2017 08:32:07  DT:  03/25/2017 09:06:22    D#:  [id number]  Job#:  [id number]

## 2017-04-11 LAB — EKG IMPRESSION: NORMAL

## 2017-04-30 LAB — MORPHOLOGY BLD-IMP: NORMAL

## 2017-04-30 PROCEDURE — 80500 HCHG CLINICAL PATH CONSULT-LIMITED: CPT

## 2017-08-14 ENCOUNTER — OFFICE VISIT (OUTPATIENT)
Dept: CARDIOLOGY | Facility: MEDICAL CENTER | Age: 67
End: 2017-08-14
Payer: MEDICARE

## 2017-08-14 VITALS
HEIGHT: 75 IN | OXYGEN SATURATION: 96 % | SYSTOLIC BLOOD PRESSURE: 116 MMHG | HEART RATE: 68 BPM | WEIGHT: 220 LBS | DIASTOLIC BLOOD PRESSURE: 62 MMHG | BODY MASS INDEX: 27.35 KG/M2

## 2017-08-14 DIAGNOSIS — I48.0 PAROXYSMAL ATRIAL FIBRILLATION (HCC): ICD-10-CM

## 2017-08-14 DIAGNOSIS — I25.10 CORONARY ARTERY DISEASE INVOLVING NATIVE HEART WITHOUT ANGINA PECTORIS, UNSPECIFIED VESSEL OR LESION TYPE: ICD-10-CM

## 2017-08-14 DIAGNOSIS — I50.22 CHRONIC SYSTOLIC CHF (CONGESTIVE HEART FAILURE) (HCC): ICD-10-CM

## 2017-08-14 LAB — EKG IMPRESSION: NORMAL

## 2017-08-14 PROCEDURE — 99205 OFFICE O/P NEW HI 60 MIN: CPT | Mod: 25 | Performed by: INTERNAL MEDICINE

## 2017-08-14 PROCEDURE — 93000 ELECTROCARDIOGRAM COMPLETE: CPT | Performed by: INTERNAL MEDICINE

## 2017-08-14 RX ORDER — METOPROLOL SUCCINATE 25 MG/1
25 TABLET, EXTENDED RELEASE ORAL DAILY
COMMUNITY
End: 2017-08-14 | Stop reason: SDUPTHER

## 2017-08-14 RX ORDER — METOPROLOL SUCCINATE 25 MG/1
TABLET, EXTENDED RELEASE ORAL
Qty: 45 TAB | Refills: 11 | Status: SHIPPED | OUTPATIENT
Start: 2017-08-14 | End: 2017-10-19

## 2017-08-14 RX ORDER — METOPROLOL SUCCINATE 50 MG/1
50 TABLET, EXTENDED RELEASE ORAL DAILY
COMMUNITY
End: 2017-08-14

## 2017-08-14 RX ORDER — TADALAFIL 5 MG/1
5 TABLET ORAL PRN
COMMUNITY
End: 2017-09-13

## 2017-08-14 ASSESSMENT — ENCOUNTER SYMPTOMS
BRUISES/BLEEDS EASILY: 0
LOSS OF CONSCIOUSNESS: 0
PALPITATIONS: 0
FALLS: 0
DIZZINESS: 0
ABDOMINAL PAIN: 0
DEPRESSION: 0
SHORTNESS OF BREATH: 1
ORTHOPNEA: 0
PND: 0

## 2017-08-14 NOTE — MR AVS SNAPSHOT
"        Francesco Schulte   2017 8:20 AM   Office Visit   MRN: [id number]    Department:  [facility]   Dept Phone:  [phone]    Description:  Male : 1950   Provider:  Liliana Song M.D.           Reason for Visit     New Patient           Allergies as of 2017     Allergen Noted Reactions    Metformin 2016   Unspecified    \"Similar to a heart attack, I was hospitalized\"    Simvastatin 2017       Myalgias    Statins [Hmg-Coa-R Inhibitors] 2017       Muscle ache, joint pain      You were diagnosed with     Paroxysmal atrial fibrillation (CMS-HCC)   [202088]         Vital Signs     Blood Pressure Pulse Height Weight Body Mass Index Oxygen Saturation    116/62 mmHg 68 1.905 m (6' 3\") 99.791 kg (220 lb) 27.50 kg/m2 96%    Smoking Status                   Former Smoker           Basic Information     Date Of Birth Sex Race Ethnicity Preferred Language    1950 Male White Non- English      Your appointments     Sep 14, 2017  7:40 AM   FOLLOW UP with Liliana Song M.D.   [facility] (--)    [address], [address]  [place] [address]   [phone]              Problem List              ICD-10-CM Priority Class Noted - Resolved    Polycythemia D75.1 High  2016 - Present    STEMI (ST elevation myocardial infarction) (CMS-HCC) I21.3 High  3/11/2017 - Present    IDDM (insulin dependent diabetes mellitus) (CMS-HCC) E11.9, Z79.4   3/12/2017 - Present    BPH (benign prostatic hyperplasia) N40.0 Medium  3/12/2017 - Present    Thrombocytosis (CMS-HCC) D47.3 High  3/12/2017 - Present    Dyslipidemia E78.5 Low  3/12/2017 - Present    Paroxysmal atrial fibrillation (CMS-HCC) I48.0 Medium  3/12/2017 - Present    Statin intolerance Z78.9   3/12/2017 - Present    MATTHIAS (acute kidney injury) (CMS-HCC) N17.9   3/12/2017 - Present    ARF (acute renal failure) (CMS-HCC) N17.9 High  3/13/2017 - Present    Leukocytosis D72.829 Medium  3/13/2017 - " Present    Hyponatremia E87.1 Medium  3/13/2017 - Present    Coagulopathy (CMS-HCC) D68.9 High  3/13/2017 - Present    Chronic systolic CHF (congestive heart failure) (CMS-HCC) I50.22 Medium  3/13/2017 - Present    DM2 (diabetes mellitus, type 2) (CMS-HCC) E11.9 Low  3/13/2017 - Present    CAD (coronary artery disease) I25.10 Medium  3/13/2017 - Present    SIRS (systemic inflammatory response syndrome) (CMS-HCC) R65.10 Low  3/16/2017 - Present    History of ST elevation myocardial infarction (STEMI) I25.2 High  3/17/2017 - Present      Health Maintenance        Date Due Completion Dates    A1C SCREENING 2/14/1951 ---    DIABETES MONOFILAMENT / LE EXAM 2/14/1951 ---    RETINAL SCREENING 8/14/1970 ---    URINE ACR / MICROALBUMIN 8/14/1970 ---    IMM DTaP/Tdap/Td Vaccine (1 - Tdap) 8/14/1971 ---    COLONOSCOPY 8/14/2002 ---    IMM ZOSTER VACCINE 8/14/2012 ---    IMM PNEUMOCOCCAL 65+ (ADULT) LOW/MEDIUM RISK SERIES (2 of 2 - PPSV23) 8/14/2017 3/16/2015    IMM INFLUENZA (1) 9/1/2017 10/16/2016    FASTING LIPID PROFILE 3/12/2018 3/12/2017    SERUM CREATININE 3/21/2018 3/21/2017, 3/20/2017, 3/19/2017, 3/18/2017, 3/16/2017, 3/14/2017, 3/13/2017, 3/12/2017, 3/11/2017, 3/11/2017, 12/2/2016, 12/1/2016            Results       Current Immunizations     13-VALENT PCV PREVNAR 3/16/2015    Influenza Vaccine Quad Inj (Preserved) 10/16/2016      Below and/or attached are the medications your provider expects you to take. Review all of your home medications and newly ordered medications with your provider and/or pharmacist. Follow medication instructions as directed by your provider and/or pharmacist. Please keep your medication list with you and share with your provider. Update the information when medications are discontinued, doses are changed, or new medications (including over-the-counter products) are added; and carry medication information at all times in the event of emergency situations     Allergies:  METFORMIN - Unspecified      SIMVASTATIN - (reactions not documented)     STATINS - (reactions not documented)               Medications  Valid as of: August 14, 2017 -  9:45 AM    Generic Name Brand Name Tablet Size Instructions for use    Apixaban (Tab) ELIQUIS 5mg Take 1 Tab by mouth 2 Times a Day.        Aspirin (Chew Tab) ASA 81 MG Take 1 Tab by mouth every day.        Colesevelam HCl (Tab) WELCHOL 625 MG Take 625 mg by mouth 2 times a day, with meals.        DiphenhydrAMINE HCl (Cap) BENADRYL 50 MG Take 50 mg by mouth every 6 hours as needed.        Famotidine (Tab) PEPCID 20 MG Take 1 Tab by mouth as needed (after hydrea).        Finasteride (Tab) PROSCAR 5 MG Take 5 mg by mouth every day.        GlipiZIDE (Tab) GLUCOTROL 10 MG Take 10 mg by mouth every evening.        Hydrocodone-Acetaminophen (Tab) NORCO 5-325 MG Take 1-2 Tabs by mouth every 6 hours as needed.        Hydroxyurea (Cap) HYDREA 500 MG Take 4 Caps by mouth every day.        Insulin Glargine (Solution) LANTUS 100 UNIT/ML Inject 20 Units as instructed every evening.        Metoprolol Succinate (TABLET SR 24 HR) TOPROL XL 25 MG Take 25 mg by mouth every day.        Mupirocin Calcium (Cream) BACTROBAN 2 % Apply twice a day and/ or after each washing to affected area.        Oxybutynin Chloride (Tab) DITROPAN 5 MG Take 1 Tab by mouth 3 times a day as needed (bladder spasm).        Tadalafil (Tab) CIALIS 5 MG Take 5 mg by mouth as needed for Erectile Dysfunction.        Tamsulosin HCl (Cap) FLOMAX 0.4 MG Take 0.4 mg by mouth 2 Times a Day.        Ticagrelor (Tab) BRILINTA 90 MG Take 90 mg by mouth 2 Times a Day.        .                 Medicines prescribed today were sent to:     [id number] - [place] - [address]    [address] [address]    Phone: [phone] Fax: [phone]    Open 24 Hours?: No    [facility] [address] [facility] - [place], [place] - [address]    [address] 1[address] [facility] [address]    Phone:  [phone] Fax: [phone]    Open 24 Hours?: No      Medication refill instructions:       If your prescription bottle indicates you have medication refills left, it is not necessary to call your provider’s office. Please contact your pharmacy and they will refill your medication.    If your prescription bottle indicates you do not have any refills left, you may request refills at any time through one of the following ways: The online [employer] system (except Urgent Care), by calling your provider’s office, or by asking your pharmacy to contact your provider’s office with a refill request. Medication refills are processed only during regular business hours and may not be available until the next business day. Your provider may request additional information or to have a follow-up visit with you prior to refilling your medication.   *Please Note: Medication refills are assigned a new Rx number when refilled electronically. Your pharmacy may indicate that no refills were authorized even though a new prescription for the same medication is available at the pharmacy. Please request the medicine by name with the pharmacy before contacting your provider for a refill.        Referral     A referral request has been sent to our patient care coordination department. Please allow 3-5 business days for us to process this request and contact you either by phone or mail. If you do not hear from us by the 5th business day, please call us at [phone].        Instructions    Please ask Dr. Sims if aspirin is needed for your stent (because of the polycytemia vera)          [employer] Access Code: Activation code not generated  Current [employer] Status: Active

## 2017-08-14 NOTE — PROGRESS NOTES
Subjective:   Francesco Schulte is a 67 y.o. male with polycythemia vera, diabetes and a long cardiac history who was referred to cardiology clinic for further management of his coronary disease. I have detailed his cardiac history in chronological order below based on my review of [facility] records and records from our hospital.      2008 - I do not have these records available for my review. It appears that patient presented with an acute MI to [facility] where he underwent PCI to his LAD. He may have underwent POBA to the diagonal as well at this time. I obtained this information from a note dictated by Dr. Roe on 4/11/2016.    February 2014 - presented with anterior STEMI. Cardiac catheterization showed patent left main. patent stents. Moderate obstructive disease in the first diagonal status post angioplasty. Severe obstructive disease in the proximal left circumflex status post PCI with Integrity 2.5 x 26 stent BMS. Patent RCA and mild nonobstructive disease in the ramus.  Severe LV dysfunction with global hypokinesis was noted at this time.    Later in 2014, his Plavix was held for a few days for neck injection. Unfortunately 4 days after stopping the Plavix, patient had an MI and required stenting.    March 2016 - again presented with an MI to [facility]. Balloon angioplasty was performed without any stenting. Brilinta was started at this time. I obtained this information from a note dictated by Dr. Roe on 4/11/2016.    December 2016 - patient presented with new diagnosis of atrial fibrillation and was started on Eliquis. He was continued on his Brilinta and the aspirin as well.  About 2 weeks later, patient presented with loss of vision on the left side. He was thought to have had a stroke and appears that he recovered spontaneously.     March 2017 - anterior STEMI. Cardiac catheterization showed subacute stent thrombosis and in-stent restenosis of the proximal LAD stent and  "diffuse disease of the mid LAD. Status post PCI to the proximal and mid LAD with a Xience Alpine 4.0x8 mm and  2.5x38 mm OSWALD respectively. No changes in medications at this time.  He reports being started on polycythemia vera treatment during this visit. Previously, per patient only has hematocrit was being monitored and treated.    --------------    Since being started on therapy for his polycythemia vera, patient reports feeling a lot better now. He feels stronger than he has in a long time. He is able to ride his bicycle for 30 minutes and cover 4-6 miles without any symptoms. He denies any dyspnea with exertion. No recurrent chest discomfort. He does report dyspnea with lying down which usually improves with 1-4 deep breaths. Occasionally he has tried to sleep on a recliner due to the symptoms. He denies any PND or lower extremity edema. No weight gain.    He was started on metoprolol after the most recent event and he states that taking the evening dose made him short of breath. So he is now currently on Toprol 25mg in the morning and 12.5mg in the evening.    He has been compliant on his polycythemia vera treatment. His platelets are now ranging between 300s to 400s. He notes that all of his cardiac events happened when his platelets were well in the 1000s.     He denies any GI/ bleeding history. He has been compliant on his aspirin, Brilinta and Eliquis.    Past Medical History   Diagnosis Date   • Diabetes (CMS-HCC)    • Stroke (CMS-HCC)    • Polycythemia vera (CMS-[facility])    • Heart attack (CMS-HCC)    • Ischemic cardiomyopathy      Past Surgical History   Procedure Laterality Date   • Other cardiac surgery       stents x 3   • Cath placement       right arm     History reviewed. No pertinent family history.  History   Smoking status   • Former Smoker   Smokeless tobacco   • Not on file         Allergies   Allergen Reactions   • Metformin Unspecified     \"Similar to a heart attack, I was hospitalized\"   • " Simvastatin      Myalgias   • Statins [Hmg-Coa-R Inhibitors]      Muscle ache, joint pain     Outpatient Encounter Prescriptions as of 8/14/2017   Medication Sig Dispense Refill   • tadalafil (CIALIS) 5 MG tablet Take 5 mg by mouth as needed for Erectile Dysfunction.     • metoprolol SR (TOPROL XL) 25 MG TABLET SR 24 HR Take 25 mg by mouth every day.     • hydroxyurea (HYDREA) 500 MG Cap Take 4 Caps by mouth every day. 30 Cap 0   • famotidine (PEPCID) 20 MG Tab Take 1 Tab by mouth as needed (after hydrea). 60 Tab 0   • aspirin (ASA) 81 MG Chew Tab chewable tablet Take 1 Tab by mouth every day. 100 Tab 0   • insulin glargine (LANTUS) 100 UNIT/ML Solution Inject 20 Units as instructed every evening.     • apixaban (ELIQUIS) 5mg Tab Take 1 Tab by mouth 2 Times a Day. 60 Tab 1   • ticagrelor (BRILINTA) 90 MG Tab tablet Take 90 mg by mouth 2 Times a Day.     • tamsulosin (FLOMAX) 0.4 MG capsule Take 0.4 mg by mouth 2 Times a Day.     • finasteride (PROSCAR) 5 MG Tab Take 5 mg by mouth every day.     • colesevelam (WELCHOL) 625 MG Tab Take 625 mg by mouth 2 times a day, with meals.     • glipiZIDE (GLUCOTROL) 10 MG Tab Take 10 mg by mouth every evening.     • [DISCONTINUED] metoprolol SR (TOPROL XL) 50 MG TABLET SR 24 HR Take 50 mg by mouth every day.     • hydrocodone-acetaminophen (NORCO) 5-325 MG Tab per tablet Take 1-2 Tabs by mouth every 6 hours as needed. 10 Tab 0   • oxybutynin (DITROPAN) 5 MG Tab Take 1 Tab by mouth 3 times a day as needed (bladder spasm). 90 Tab 0   • [DISCONTINUED] metoprolol (LOPRESSOR) 25 MG Tab Take 1 Tab by mouth every morning. (Patient taking differently: Take 25 mg by mouth 2 times a day.) 60 Tab 0   • [DISCONTINUED] metoprolol (LOPRESSOR) 25 MG Tab Take 0.5 Tabs by mouth every evening. 60 Tab 0   • [DISCONTINUED] fludrocortisone (FLORINEF) 0.1 MG Tab Take 1 Tab by mouth every morning. 30 Tab 0   • diphenhydrAMINE (BENADRYL) 50 MG Cap Take 50 mg by mouth every 6 hours as needed.     •  "mupirocin calcium (BACTROBAN) 2 % Cream Apply twice a day and/ or after each washing to affected area. 1 Tube 0     No facility-administered encounter medications on file as of 8/14/2017.     Review of Systems   Constitutional: Negative for malaise/fatigue.   HENT: Negative.    Respiratory: Positive for shortness of breath.    Cardiovascular: Negative for chest pain, palpitations, orthopnea, leg swelling and PND.   Gastrointestinal: Negative for abdominal pain.   Musculoskeletal: Negative for falls.   Skin: Negative.    Neurological: Negative for dizziness and loss of consciousness.   Endo/Heme/Allergies: Does not bruise/bleed easily.   Psychiatric/Behavioral: Negative for depression.   All other systems reviewed and are negative.       Objective:   /62 mmHg  Pulse 68  Ht 1.905 m (6' 3\")  Wt 99.791 kg (220 lb)  BMI 27.50 kg/m2  SpO2 96%    Physical Exam   Constitutional: He is oriented to person, place, and time. No distress.   HENT:   Head: Normocephalic and atraumatic.   Eyes: Conjunctivae are normal.   Neck: Normal range of motion. Neck supple. No JVD present.   Cardiovascular: Normal rate, regular rhythm and normal heart sounds.  Exam reveals no gallop and no friction rub.    No murmur heard.  Pulmonary/Chest: Effort normal and breath sounds normal. No respiratory distress. He has no wheezes. He has no rales.   Abdominal: Soft. There is no tenderness.   Musculoskeletal: He exhibits no edema.   Neurological: He is alert and oriented to person, place, and time.   Skin: Skin is warm and dry. He is not diaphoretic.   Psychiatric: He has a normal mood and affect.   Nursing note and vitals reviewed.     Echocardiogram in July 2016 at [facility], report copied below. Read by   Summary  Technically difficult study.  Normal sized Left Ventricle with moderately reduced systolic function.  Left Ventricle Ejection Fraction 40 to 45%. No Left Ventricular  hypertrophy. Hypokinesis of the apical septum, " inferoapical wall, apical  lateral wall and distal anteroapical wall consistent with prior MI in lad  distribution.  Mildly dilated Left Atrium.  Normal sized Right Atrium and normal sized Right Ventricle with normal  systolic function.  Normal Aortic valve without stenosis. No Aortic valve regurgitation.  Normal sized proximal Aorta.  Normal Mitral valve without stenosis. No Mitral valve regurgitation.  Tricuspid valve regurgitation. Right Ventricular Systolic Pressure can not  be accurately assessed.  No pericardial effusion. Normal sized and responsive Inferior Vena Cava  consistent with normal central venous pressure.  compared with echo 6/15, slightly worse EF with with similar distribution  of regional wall motion abnormality.    Echocardiogram in December 2016 at [facility], report copied below. Read by Dr. Epstein  Summary  Left ventricular systolic function is mildly reduced and the ejection  fraction is 50-55%.  Thinned out myocardium and akinetic segments of mid to apical anteroseptal  walls and anterior apex are indicative of prior myocardial infarction.  Mildly increased left ventricle cavity size.  Moderately dilated left atrium.  Compared with previous echocardiogram performed on 7/18/2016, LV function  has mildly improved.    Echocardiogram performed here in March 2017 was reviewed  CONCLUSIONS  Mild reduced left ventricular systolic function.  Left ventricular ejection fraction is visually estimated to be 45%.  Septal wall akinesis.  Right heart pressures are normal.  Aortic sclerosis without stenosis.  Mild aortic insufficiency.    EKG from today was reviewed and shows sinus bradycardia at 57 bpm, left axis deviation, Q waves in V1 through V3, incomplete left bundle branch block.    Assessment:     1. Paroxysmal atrial fibrillation (CMS-[facility])  EKG    REFERRAL TO ANTICOAGULATION MONITORING   2. Coronary artery disease involving native heart without angina pectoris, unspecified vessel or lesion type     3.  Chronic systolic CHF (congestive heart failure) (CMS-[facility])         Medical Decision Making:  Today's Assessment / Status / Plan:     Coronary artery disease status post PCI  Ischemic cardiomyopathy (EF 45%)    No recurrent anginal symptoms since last PCI in March. Euvolemic on exam. NYHA class I.  Patient is currently on aspirin and Brilinta. Since he is also on anticoagulation for his paroxysmal atrial fibrillation and it has been over one month since his PCI, he should only be on anticoagulation and Brilinta at this time (according to the WOEST trial).  However given his significant cardiac history and polycythemia vera, I am hesitant to discontinue his aspirin at this time. He is scheduled to see Dr. Sims in the hematology clinic tomorrow. I will request Dr. Sims's opinion about this matter. Since his platelet count is significantly improved, I am hopeful that his aspirin can be discontinued at this time to minimize long-term bleeding risk.  Continue metoprolol XL at current dose.   Patient is currently not on any ACE inhibitor or ARB likely secondary to his history of hypotension. I will reevaluate this at the next visit.  Can consider repeating an echocardiogram at the next visit to reevaluate his LV function. Based on review of his prior records, his LV EF drops after STEMI but usually recovers.  He is euvolemic on exam. No clear indication for diuretics at this time.    Paroxysmal atrial fibrillation - in sinus today. Denies any recurrent symptoms of atrial fibrillation. For now continue metoprolol at current dose 25 mg in a.m., 12.5 mg in p.m.   Patient's CHADS-Vasc score is at least 6 (age, DM2, CAD, HF, CVA). Patient is currently tolerating Eliquis well. I am concerned about his bleeding risk while he is on Brilinta and aspirin as well. Any bleeding in the future may also require discontinuation of his antiplatelet therapy which would be dangerous for this patient given his history as noted above. I  discussed the option of Coumadin instead which does have the option of a reversal agent should he have severe bleeding as the future. Patient is agreeable with this plan for now. I have placed a Coumadin clinic referral today.     Return to clinic in 1 month or earlier if needed.    Total 60 minutes face-to-face time spent with patient, with greater than 50% of the total time discussing patient's issues and symptoms as listed above in assessment and plan, as well as managing coordination of care for future evaluation and treatment.    Thank you for allowing me to participate in the care of this patient. Please do not hesitate to contact me with any questions.    Liliana Song MD  Cardiologist  [facility]    PLEASE NOTE: This dictation was created using voice recognition software.

## 2017-08-21 ENCOUNTER — ANTICOAGULATION VISIT (OUTPATIENT)
Dept: MEDICAL GROUP | Facility: MEDICAL CENTER | Age: 67
End: 2017-08-21
Payer: MEDICARE

## 2017-08-21 VITALS — DIASTOLIC BLOOD PRESSURE: 73 MMHG | HEART RATE: 71 BPM | SYSTOLIC BLOOD PRESSURE: 113 MMHG

## 2017-08-21 DIAGNOSIS — D68.9 COAGULOPATHY (HCC): ICD-10-CM

## 2017-08-21 DIAGNOSIS — Z79.01 LONG TERM (CURRENT) USE OF ANTICOAGULANTS: ICD-10-CM

## 2017-08-21 LAB — INR PPP: 1.6 (ref 2–3.5)

## 2017-08-21 PROCEDURE — 85610 PROTHROMBIN TIME: CPT | Performed by: FAMILY MEDICINE

## 2017-08-21 PROCEDURE — 99211 OFF/OP EST MAY X REQ PHY/QHP: CPT | Performed by: FAMILY MEDICINE

## 2017-08-21 RX ORDER — WARFARIN SODIUM 5 MG/1
5 TABLET ORAL DAILY
Qty: 30 TAB | Refills: 3 | Status: SHIPPED | OUTPATIENT
Start: 2017-08-21 | End: 2018-01-09

## 2017-08-21 RX ORDER — PHYTONADIONE 5 MG/1
5 TABLET ORAL ONCE
Qty: 3 TAB | Refills: 0 | Status: SHIPPED | OUTPATIENT
Start: 2017-08-21 | End: 2017-08-21

## 2017-08-21 NOTE — MR AVS SNAPSHOT
"        Francesco Schulte   2017 7:45 AM   Anticoagulation Visit   MRN: [id number]    Department:  [facility]   Dept Phone:  [phone]    Description:  Male : 1950   Provider:  [facility]           Allergies as of 2017     Allergen Noted Reactions    Metformin 2016   Unspecified    \"Similar to a heart attack, I was hospitalized\"    Simvastatin 2017       Myalgias    Statins [Hmg-Coa-R Inhibitors] 2017       Muscle ache, joint pain      You were diagnosed with     Coagulopathy (CMS-HCC)   [131949]       Long term (current) use of anticoagulants   [V58.61.ICD-9-CM]         Vital Signs     Blood Pressure Pulse Smoking Status             113/73 mmHg 71 Former Smoker         Basic Information     Date Of Birth Sex Race Ethnicity Preferred Language    1950 Male White Non- English      Your appointments     Sep 05, 2017  8:00 AM   Anti-Coag Routine with [facility]ARMAC[facility]   [facility]    [address]  [address]  [place] [id number] [phone]            Sep 14, 2017  7:40 AM   FOLLOW UP with Liliana Song M.D.   [facility] (--)    [address], [address]  [place] [id number] [phone]              Problem List              ICD-10-CM Priority Class Noted - Resolved    Polycythemia D75.1 High  2016 - Present    STEMI (ST elevation myocardial infarction) (CMS-HCC) I21.3 High  3/11/2017 - Present    IDDM (insulin dependent diabetes mellitus) (CMS-HCC) E11.9, Z79.4   3/12/2017 - Present    BPH (benign prostatic hyperplasia) N40.0 Medium  3/12/2017 - Present    Thrombocytosis (CMS-HCC) D47.3 High  3/12/2017 - Present    Dyslipidemia E78.5 Low  3/12/2017 - Present    Paroxysmal atrial fibrillation (CMS-HCC) I48.0 Medium  3/12/2017 - Present    Statin intolerance Z78.9   3/12/2017 - Present    MATTHIAS (acute kidney injury) (CMS-HCC) N17.9   3/12/2017 - " Present    ARF (acute renal failure) (CMS-HCC) N17.9 High  3/13/2017 - Present    Leukocytosis D72.829 Medium  3/13/2017 - Present    Hyponatremia E87.1 Medium  3/13/2017 - Present    Coagulopathy (CMS-HCC) D68.9 High  3/13/2017 - Present    Chronic systolic CHF (congestive heart failure) (CMS-HCC) I50.22 Medium  3/13/2017 - Present    DM2 (diabetes mellitus, type 2) (CMS-HCC) E11.9 Low  3/13/2017 - Present    CAD (coronary artery disease) I25.10 Medium  3/13/2017 - Present    SIRS (systemic inflammatory response syndrome) (CMS-HCC) R65.10 Low  3/16/2017 - Present    History of ST elevation myocardial infarction (STEMI) I25.2 High  3/17/2017 - Present    Long term (current) use of anticoagulants [Z79.01] Z79.01   8/21/2017 - Present      Health Maintenance        Date Due Completion Dates    A1C SCREENING 2/14/1951 ---    DIABETES MONOFILAMENT / LE EXAM 2/14/1951 ---    RETINAL SCREENING 8/14/1970 ---    URINE ACR / MICROALBUMIN 8/14/1970 ---    IMM DTaP/Tdap/Td Vaccine (1 - Tdap) 8/14/1971 ---    COLONOSCOPY 8/14/2002 ---    IMM ZOSTER VACCINE 8/14/2012 ---    IMM PNEUMOCOCCAL 65+ (ADULT) LOW/MEDIUM RISK SERIES (2 of 2 - PPSV23) 8/14/2017 3/16/2015    IMM INFLUENZA (1) 9/1/2017 10/16/2016    FASTING LIPID PROFILE 3/12/2018 3/12/2017    SERUM CREATININE 3/21/2018 3/21/2017, 3/20/2017, 3/19/2017, 3/18/2017, 3/16/2017, 3/14/2017, 3/13/2017, 3/12/2017, 3/11/2017, 3/11/2017, 12/2/2016, 12/1/2016            Results     POCT Protime      Component    INR    1.6    Comment:     ic valid [id number] 160 exp 06/2018                        Current Immunizations     13-VALENT PCV PREVNAR 3/16/2015    Influenza Vaccine Quad Inj (Preserved) 10/16/2016      Below and/or attached are the medications your provider expects you to take. Review all of your home medications and newly ordered medications with your provider and/or pharmacist. Follow medication instructions as directed by your provider and/or pharmacist. Please keep your  medication list with you and share with your provider. Update the information when medications are discontinued, doses are changed, or new medications (including over-the-counter products) are added; and carry medication information at all times in the event of emergency situations     Allergies:  METFORMIN - Unspecified     SIMVASTATIN - (reactions not documented)     STATINS - (reactions not documented)               Medications  Valid as of: August 21, 2017 -  8:03 AM    Generic Name Brand Name Tablet Size Instructions for use    Colesevelam HCl (Tab) WELCHOL 625 MG Take 625 mg by mouth 2 times a day, with meals.        DiphenhydrAMINE HCl (Cap) BENADRYL 50 MG Take 50 mg by mouth every 6 hours as needed.        Famotidine (Tab) PEPCID 20 MG Take 1 Tab by mouth as needed (after hydrea).        Finasteride (Tab) PROSCAR 5 MG Take 5 mg by mouth every day.        GlipiZIDE (Tab) GLUCOTROL 10 MG Take 10 mg by mouth every evening.        Hydrocodone-Acetaminophen (Tab) NORCO 5-325 MG Take 1-2 Tabs by mouth every 6 hours as needed.        Hydroxyurea (Cap) HYDREA 500 MG Take 4 Caps by mouth every day.        Insulin Glargine (Solution) LANTUS 100 UNIT/ML Inject 20 Units as instructed every evening.        Metoprolol Succinate (TABLET SR 24 HR) TOPROL XL 25 MG Take 25 mg in the morning and 12.5 mg in the evening.        Mupirocin Calcium (Cream) BACTROBAN 2 % Apply twice a day and/ or after each washing to affected area.        Oxybutynin Chloride (Tab) DITROPAN 5 MG Take 1 Tab by mouth 3 times a day as needed (bladder spasm).        Tadalafil (Tab) CIALIS 5 MG Take 5 mg by mouth as needed for Erectile Dysfunction.        Tamsulosin HCl (Cap) FLOMAX 0.4 MG Take 0.4 mg by mouth 2 Times a Day.        Ticagrelor (Tab) BRILINTA 90 MG Take 90 mg by mouth 2 Times a Day.        Warfarin Sodium (Tab) COUMADIN 5 MG Take 1 Tab by mouth every day.        .                 Medicines prescribed today were sent to:     [facility]  [id number] - [place], [place] - [address]    [address] [place] [address]    Phone: [phone] Fax: [phone]    Open 24 Hours?: No    [facility] [place] [facility] - [place], [place] - [address]    [address] [facility] [address]    Phone: [phone] Fax: [phone]    Open 24 Hours?: No      Medication refill instructions:       If your prescription bottle indicates you have medication refills left, it is not necessary to call your provider’s office. Please contact your pharmacy and they will refill your medication.    If your prescription bottle indicates you do not have any refills left, you may request refills at any time through one of the following ways: The online [employer] system (except Urgent Care), by calling your provider’s office, or by asking your pharmacy to contact your provider’s office with a refill request. Medication refills are processed only during regular business hours and may not be available until the next business day. Your provider may request additional information or to have a follow-up visit with you prior to refilling your medication.   *Please Note: Medication refills are assigned a new Rx number when refilled electronically. Your pharmacy may indicate that no refills were authorized even though a new prescription for the same medication is available at the pharmacy. Please request the medicine by name with the pharmacy before contacting your provider for a refill.        Warfarin Dosing Calendar   August 2017 Details    Sun Mon Tue Wed Thu Fri Sat       1               2               3               4               5                 6               7               8               9               10               11               12                 13               14               15               16               17               18               19                 20               21   1.6      See details      22               23                24               25               26                 27               28               29               30               31                  Date Details   08/21 This INR check   INR: 1.6   ic valid [id number] 160 exp 06/2018                 Warfarin Dosing Calendar   September 2017 Details    Sun Mon Tue Wed Thu Fri Sat          1               2                 3               4               5               6               7               8               9                 10               11               12               13               14               15               16                 17               18               19               20               21               22               23                 24               25               26               27               28               29               30                Date Details   No additional details    Date of next INR:  9/5/2017              MyChart Access Code: Activation code not generated  Current [facility] Status: Active

## 2017-08-21 NOTE — PROGRESS NOTES
Anticoagulation Summary as of 8/21/2017     INR goal 2.0-3.0   Selected INR 1.6! (8/21/2017)   Maintenance plan No maintenance plan   Next INR check 9/5/2017   Target end date     Indications   Coagulopathy (CMS-HCC) [D68.9]  Long term (current) use of anticoagulants [Z79.01] [Z79.01]  A-fib (CMS-HCC) (Resolved) [I48.91]         Anticoagulation Episode Summary     INR check location [facility]    Preferred lab     Send INR reminders to     Comments       Anticoagulation Care Providers     Provider Role Specialty Phone number    [facility] Responsible  [phone]        Anticoagulation Patient Findings      Patient Active Problem List    Diagnosis Date Noted   • History of ST elevation myocardial infarction (STEMI) 03/17/2017     Priority: High   • ARF (acute renal failure) (CMS-HCC) 03/13/2017     Priority: High   • Coagulopathy (CMS-HCC) 03/13/2017     Priority: High   • Thrombocytosis (CMS-HCC) 03/12/2017     Priority: High   • STEMI (ST elevation myocardial infarction) (CMS-HCC) 03/11/2017     Priority: High   • Polycythemia 12/01/2016     Priority: High   • Leukocytosis 03/13/2017     Priority: Medium   • Hyponatremia 03/13/2017     Priority: Medium   • Chronic systolic CHF (congestive heart failure) (CMS-HCC) 03/13/2017     Priority: Medium   • CAD (coronary artery disease) 03/13/2017     Priority: Medium   • BPH (benign prostatic hyperplasia) 03/12/2017     Priority: Medium   • Paroxysmal atrial fibrillation (CMS-HCC) 03/12/2017     Priority: Medium   • SIRS (systemic inflammatory response syndrome) (CMS-HCC) 03/16/2017     Priority: Low   • DM2 (diabetes mellitus, type 2) (CMS-HCC) 03/13/2017     Priority: Low   • Dyslipidemia 03/12/2017     Priority: Low   • Long term (current) use of anticoagulants [Z79.01] 08/21/2017   • IDDM (insulin dependent diabetes mellitus) (CMS-HCC) 03/12/2017   • Statin intolerance 03/12/2017   • MATTHIAS (acute kidney injury) (CMS-HCC) 03/12/2017        Current Outpatient Prescriptions on File Prior to Visit   Medication Sig Dispense Refill   • tadalafil (CIALIS) 5 MG tablet Take 5 mg by mouth as needed for Erectile Dysfunction.     • metoprolol SR (TOPROL XL) 25 MG TABLET SR 24 HR Take 25 mg in the morning and 12.5 mg in the evening. 45 Tab 11   • hydrocodone-acetaminophen (NORCO) 5-325 MG Tab per tablet Take 1-2 Tabs by mouth every 6 hours as needed. 10 Tab 0   • hydroxyurea (HYDREA) 500 MG Cap Take 4 Caps by mouth every day. 30 Cap 0   • famotidine (PEPCID) 20 MG Tab Take 1 Tab by mouth as needed (after hydrea). 60 Tab 0   • oxybutynin (DITROPAN) 5 MG Tab Take 1 Tab by mouth 3 times a day as needed (bladder spasm). 90 Tab 0   • diphenhydrAMINE (BENADRYL) 50 MG Cap Take 50 mg by mouth every 6 hours as needed.     • insulin glargine (LANTUS) 100 UNIT/ML Solution Inject 20 Units as instructed every evening.     • mupirocin calcium (BACTROBAN) 2 % Cream Apply twice a day and/ or after each washing to affected area. 1 Tube 0   • ticagrelor (BRILINTA) 90 MG Tab tablet Take 90 mg by mouth 2 Times a Day.     • tamsulosin (FLOMAX) 0.4 MG capsule Take 0.4 mg by mouth 2 Times a Day.     • finasteride (PROSCAR) 5 MG Tab Take 5 mg by mouth every day.     • colesevelam (WELCHOL) 625 MG Tab Take 625 mg by mouth 2 times a day, with meals.     • glipiZIDE (GLUCOTROL) 10 MG Tab Take 10 mg by mouth every evening.       No current facility-administered medications on file prior to visit.       Lab Results   Component Value Date/Time    SODIUM 140 03/21/2017 02:33 AM    POTASSIUM 3.4* 03/21/2017 02:33 AM    CHLORIDE 111 03/21/2017 02:33 AM    CO2 21 03/21/2017 02:33 AM    GLUCOSE 68 03/21/2017 02:33 AM    BUN 16 03/21/2017 02:33 AM    CREATININE 0.92 03/21/2017 02:33 AM        Francesco Eris referred to the [facility] for new Afib, he has also recently had a ST elevation MI and Stroke is on Brilinta (cardiologist took him off ASA).  He was started on Eliquis 5mg bid during  recent hospital visit but after recent appt with Cardiologist he would like to use Warfarin as he is very active and travels to multiple areas without cell coverage and wants something that can be reversed.      CHADSVAS=6 per     Pt gave verbal consent  to leave a VM with detailed medical information regarding INR and dose of warfarin.    Pt tolerating medication well, no s/s of bleeding.     Med rec done with pt (see above)    Pt education done (s/s of bleeding, when to f/u with clinic vs ER, foods with vitamin K, etc)    Follow up appointment in 2 week(s).    He will continue with Eliquis at this point until 9/5/2017 because he has a trip and will be unavailable until 9/4/2017.     He will see us in clinic to test INR and start warfarin 5 mg once daily on 9/5/2017    He will bridge with both as he had a recent stroke until INR is >2    He also wanted a Rx for Mephyton for emergencies. He understand that this is only to be used for severe bleeding that is uncontrollable.     Also sent in a application for a [facility] as he will not be able to test his INR at times due to travel.     Tobias Encinas, PHARMD

## 2017-08-22 ENCOUNTER — TELEPHONE (OUTPATIENT)
Dept: VASCULAR LAB | Facility: MEDICAL CENTER | Age: 67
End: 2017-08-22

## 2017-08-22 NOTE — TELEPHONE ENCOUNTER
Initial anticoagulation clinic note and most recent cardiology note reviewed.    Pending further recommendations from cardiology, we will continue with indefinite anticoagulation for atrial fibrillation withCHADS-VASC score = at least 6.    Patient also with coronary artery disease status post PCI with ischemic cardiomyopathy. Will defer management of concomitant antiplatelet agents to cardiology    Will defer all management of all other cardiovascular issues, aside from anticoagulation, to cardiology    Michael J. Bloch, MD  [facility]    CC:  Dr. Nohemi Sims

## 2017-09-05 ENCOUNTER — ANTICOAGULATION VISIT (OUTPATIENT)
Dept: MEDICAL GROUP | Facility: MEDICAL CENTER | Age: 67
End: 2017-09-05
Payer: MEDICARE

## 2017-09-05 VITALS — HEART RATE: 71 BPM | SYSTOLIC BLOOD PRESSURE: 131 MMHG | DIASTOLIC BLOOD PRESSURE: 84 MMHG

## 2017-09-05 DIAGNOSIS — D68.9 COAGULOPATHY (HCC): ICD-10-CM

## 2017-09-05 DIAGNOSIS — Z79.01 LONG TERM (CURRENT) USE OF ANTICOAGULANTS: ICD-10-CM

## 2017-09-05 LAB — INR PPP: 1.2 (ref 2–3.5)

## 2017-09-05 PROCEDURE — 85610 PROTHROMBIN TIME: CPT | Performed by: FAMILY MEDICINE

## 2017-09-05 PROCEDURE — 99211 OFF/OP EST MAY X REQ PHY/QHP: CPT | Performed by: FAMILY MEDICINE

## 2017-09-05 NOTE — PROGRESS NOTES
Anticoagulation Summary  As of 9/5/2017    INR goal:   2.0-3.0   TTR:   0.0 % (5 d)   Today's INR:   1.2!   Maintenance plan:   5 mg (5 mg x 1) every day   Weekly total:   35 mg   Plan last modified:   Tobias Encinas, PharmD (9/5/2017)   Next INR check:   9/11/2017   Target end date:   Indefinite    Indications    Coagulopathy (CMS-HCC) [D68.9]  Long term (current) use of anticoagulants [Z79.01] [Z79.01]  A-fib (CMS-[facility]) (Resolved) [I48.91]             Anticoagulation Episode Summary     [facility] check [facility]   [facility]    Preferred lab:       Send INR reminders to:       Comments:   Eliquis        Anticoagulation Care Providers     Provider Role Specialty Phone number    [facility] Responsible  [phone]        Anticoagulation Patient Findings    Francesco Schulte seen in clinic today  INR  sub-therapeutic. He is on Eliquis but will use Warfarin at this point, he suzette like stay on the Eliquis and Warfairn until INR>2.     Denies signs/symptoms of bleeding and/or thrombosis.    Denies changes to diet or medications.   Follow up appointment in 5 days    Start weekly warfarin dose as noted tomorrow and Nusrat Encinas, PharmD

## 2017-09-11 ENCOUNTER — HOSPITAL ENCOUNTER (OUTPATIENT)
Dept: LAB | Facility: MEDICAL CENTER | Age: 67
End: 2017-09-11
Attending: INTERNAL MEDICINE
Payer: MEDICARE

## 2017-09-11 ENCOUNTER — ANTICOAGULATION VISIT (OUTPATIENT)
Dept: MEDICAL GROUP | Facility: MEDICAL CENTER | Age: 67
End: 2017-09-11
Payer: MEDICARE

## 2017-09-11 VITALS — HEART RATE: 70 BPM | SYSTOLIC BLOOD PRESSURE: 149 MMHG | DIASTOLIC BLOOD PRESSURE: 86 MMHG

## 2017-09-11 DIAGNOSIS — D68.9 COAGULOPATHY (HCC): ICD-10-CM

## 2017-09-11 DIAGNOSIS — Z79.01 LONG TERM (CURRENT) USE OF ANTICOAGULANTS: ICD-10-CM

## 2017-09-11 LAB — INR PPP: 3.3 (ref 2–3.5)

## 2017-09-11 PROCEDURE — 99211 OFF/OP EST MAY X REQ PHY/QHP: CPT | Performed by: FAMILY MEDICINE

## 2017-09-11 PROCEDURE — 80053 COMPREHEN METABOLIC PANEL: CPT

## 2017-09-11 PROCEDURE — 85610 PROTHROMBIN TIME: CPT | Performed by: FAMILY MEDICINE

## 2017-09-11 NOTE — PROGRESS NOTES
Anticoagulation Summary  As of 9/11/2017    INR goal:   2.0-3.0   TTR:   25.2 % (1.6 wk)   Today's INR:   3.3!   Maintenance plan:   2.5 mg (5 mg x 0.5) on Tue, Thu; 5 mg (5 mg x 1) all other days   Weekly total:   30 mg   Plan last modified:   Tobias Encinas, PharmD (9/11/2017)   Next INR check:   9/14/2017   Target end date:   Indefinite    Indications    Coagulopathy (CMS-HCC) [D68.9]  Long term (current) use of anticoagulants [Z79.01] [Z79.01]  A-fib (CMS-HCC) (Resolved) [I48.91]             Anticoagulation Episode Summary     [facility]   [facility]    Preferred lab:       Send INR reminders to:       Comments:           Anticoagulation Care Providers     Provider Role Specialty Phone number    [facility] Responsible  [phone]        Anticoagulation Patient Findings    HPI:  Francesco Schulte seen in clinic today, he is on warfarin and Eliquis for Afib, he is switching to Warfarin from Eliquis.   Denies signs/symptoms of bleeding and/or thrombosis.    Denies changes to diet or medications.   Follow up appointment in 1 week(s).    A/P  INR  supra-therapeutic on Eliquis, he will stop eliquis today and continue with Warfarin only   Decrease weekly warfarin dose as noted and stop Eliquis      Tobias Encinas, PharmD

## 2017-09-12 LAB
ALBUMIN SERPL BCP-MCNC: 3.5 G/DL (ref 3.2–4.9)
ALBUMIN/GLOB SERPL: 0.9 G/DL
ALP SERPL-CCNC: 53 U/L (ref 30–99)
ALT SERPL-CCNC: 16 U/L (ref 2–50)
ANION GAP SERPL CALC-SCNC: 9 MMOL/L (ref 0–11.9)
AST SERPL-CCNC: 33 U/L (ref 12–45)
BILIRUB SERPL-MCNC: 0.6 MG/DL (ref 0.1–1.5)
BUN SERPL-MCNC: 14 MG/DL (ref 8–22)
CALCIUM SERPL-MCNC: 9.2 MG/DL (ref 8.5–10.5)
CHLORIDE SERPL-SCNC: 106 MMOL/L (ref 96–112)
CO2 SERPL-SCNC: 22 MMOL/L (ref 20–33)
CREAT SERPL-MCNC: 1.07 MG/DL (ref 0.5–1.4)
GFR SERPL CREATININE-BSD FRML MDRD: >60 ML/MIN/1.73 M 2
GLOBULIN SER CALC-MCNC: 3.7 G/DL (ref 1.9–3.5)
GLUCOSE SERPL-MCNC: 159 MG/DL (ref 65–99)
POTASSIUM SERPL-SCNC: 4.4 MMOL/L (ref 3.6–5.5)
PROT SERPL-MCNC: 7.2 G/DL (ref 6–8.2)
SODIUM SERPL-SCNC: 137 MMOL/L (ref 135–145)

## 2017-09-13 ENCOUNTER — OFFICE VISIT (OUTPATIENT)
Dept: MEDICAL GROUP | Facility: MEDICAL CENTER | Age: 67
End: 2017-09-13
Payer: MEDICARE

## 2017-09-13 VITALS
TEMPERATURE: 97.6 F | BODY MASS INDEX: 29.03 KG/M2 | OXYGEN SATURATION: 100 % | HEART RATE: 69 BPM | HEIGHT: 73 IN | RESPIRATION RATE: 16 BRPM | SYSTOLIC BLOOD PRESSURE: 122 MMHG | DIASTOLIC BLOOD PRESSURE: 76 MMHG | WEIGHT: 219 LBS

## 2017-09-13 DIAGNOSIS — I25.10 CORONARY ARTERY DISEASE INVOLVING NATIVE HEART WITHOUT ANGINA PECTORIS, UNSPECIFIED VESSEL OR LESION TYPE: ICD-10-CM

## 2017-09-13 DIAGNOSIS — N40.1 BENIGN PROSTATIC HYPERPLASIA WITH LOWER URINARY TRACT SYMPTOMS, UNSPECIFIED MORPHOLOGY: ICD-10-CM

## 2017-09-13 DIAGNOSIS — I48.0 PAROXYSMAL ATRIAL FIBRILLATION (HCC): ICD-10-CM

## 2017-09-13 DIAGNOSIS — Z12.5 SCREENING PSA (PROSTATE SPECIFIC ANTIGEN): ICD-10-CM

## 2017-09-13 DIAGNOSIS — G89.4 CHRONIC PAIN SYNDROME: ICD-10-CM

## 2017-09-13 DIAGNOSIS — Z78.9 STATIN INTOLERANCE: ICD-10-CM

## 2017-09-13 DIAGNOSIS — Z91.81 RISK FOR FALLS: ICD-10-CM

## 2017-09-13 DIAGNOSIS — Z79.01 LONG TERM (CURRENT) USE OF ANTICOAGULANTS: ICD-10-CM

## 2017-09-13 DIAGNOSIS — D75.1 POLYCYTHEMIA: ICD-10-CM

## 2017-09-13 DIAGNOSIS — Z77.098 AGENT ORANGE EXPOSURE: ICD-10-CM

## 2017-09-13 PROBLEM — N17.9 ARF (ACUTE RENAL FAILURE) (HCC): Status: RESOLVED | Noted: 2017-03-13 | Resolved: 2017-09-13

## 2017-09-13 PROBLEM — N17.9 AKI (ACUTE KIDNEY INJURY) (HCC): Status: RESOLVED | Noted: 2017-03-12 | Resolved: 2017-09-13

## 2017-09-13 PROCEDURE — 99204 OFFICE O/P NEW MOD 45 MIN: CPT | Performed by: NURSE PRACTITIONER

## 2017-09-13 ASSESSMENT — PATIENT HEALTH QUESTIONNAIRE - PHQ9: CLINICAL INTERPRETATION OF PHQ2 SCORE: 0

## 2017-09-13 NOTE — ASSESSMENT & PLAN NOTE
On glipizide 10 mg qam, lantus 15 u qhs  Reportedly controlled although no a1c on record  Feels he is having hypoglycemic events  No history of CKD, no neuropathy

## 2017-09-13 NOTE — ASSESSMENT & PLAN NOTE
Continues with flomax 0.4 mg BID, finasteride 5 mg daily  Still having persistent symptoms-frequent urination, up several times a night, difficulty initiating urine stream  Previously seen by urology, not recently  Was on daily cialis for a while, was expensive  H/o benign biopsy many years ago
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
## 2017-09-13 NOTE — LETTER
[facility]  CONNER Harman.  [address]  [place] [address]  Fax: [phone]   Authorization for Release/Disclosure of   Protected Health Information   Name: FRANCESCO ROMEO : 1950 SSN: [id number]   Address: [address] [place] [address] [place] [address] Phone:    [phone] (home)    I authorize the entity listed below to release/disclose the PHI below to:   [facility]/ALVINO Harman and ALVINO Harman   Provider or Entity Name:  [facility]   City, State, Zip   Phone:      Fax:     Reason for request: continuity of care   Information to be released:    [  ] LAST COLONOSCOPY,  including any PATH REPORT and follow-up  [  ] LAST FIT/COLOGUARD RESULT [  ] LAST DEXA  [  ] LAST MAMMOGRAM  [  ] LAST PAP  [  ] LAST LABS [  ] RETINA EXAM REPORT  [  ] IMMUNIZATION RECORDS  [  ] Release all info      [  ] Check here and initial the line next to each item to release ALL health information INCLUDING  _____ Care and treatment for drug and / or alcohol abuse  _____ HIV testing, infection status, or AIDS  _____ Genetic Testing    DATES OF SERVICE OR TIME PERIOD TO BE DISCLOSED: _____________  I understand and acknowledge that:  * This Authorization may be revoked at any time by you in writing, except if your health information has already been used or disclosed.  * Your health information that will be used or disclosed as a result of you signing this authorization could be re-disclosed by the recipient. If this occurs, your re-disclosed health information may no longer be protected by State or Federal laws.  * You may refuse to sign this Authorization. Your refusal will not affect your ability to obtain treatment.  * This Authorization becomes effective upon signing and will  on (date) __________.      If no date is indicated, this Authorization will  one (1) year from the signature date.    Name: Francesco Romeo    Signature:   Date:     2017            PLEASE FAX REQUESTED RECORDS BACK TO: [phone]

## 2017-09-13 NOTE — ASSESSMENT & PLAN NOTE
Currently switching anticoag from eliquis to warfarin  On metoprolol 25 mg 1.5 tab daily  Followed by cardiology
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
## 2017-09-14 ENCOUNTER — OFFICE VISIT (OUTPATIENT)
Dept: CARDIOLOGY | Facility: MEDICAL CENTER | Age: 67
End: 2017-09-14
Payer: MEDICARE

## 2017-09-14 ENCOUNTER — ANTICOAGULATION VISIT (OUTPATIENT)
Dept: VASCULAR LAB | Facility: MEDICAL CENTER | Age: 67
End: 2017-09-14
Attending: INTERNAL MEDICINE
Payer: MEDICARE

## 2017-09-14 VITALS
HEIGHT: 73 IN | HEART RATE: 84 BPM | SYSTOLIC BLOOD PRESSURE: 120 MMHG | OXYGEN SATURATION: 96 % | DIASTOLIC BLOOD PRESSURE: 78 MMHG | BODY MASS INDEX: 29.16 KG/M2 | WEIGHT: 220 LBS

## 2017-09-14 VITALS — HEART RATE: 80 BPM | SYSTOLIC BLOOD PRESSURE: 120 MMHG | DIASTOLIC BLOOD PRESSURE: 74 MMHG

## 2017-09-14 DIAGNOSIS — D68.9 COAGULOPATHY (HCC): ICD-10-CM

## 2017-09-14 DIAGNOSIS — Z79.01 LONG TERM (CURRENT) USE OF ANTICOAGULANTS: ICD-10-CM

## 2017-09-14 DIAGNOSIS — I50.22 CHRONIC SYSTOLIC CHF (CONGESTIVE HEART FAILURE) (HCC): ICD-10-CM

## 2017-09-14 LAB
INR BLD: 2.5 (ref 0.9–1.2)
INR PPP: 2.5 (ref 2–3.5)

## 2017-09-14 PROCEDURE — 99214 OFFICE O/P EST MOD 30 MIN: CPT | Performed by: INTERNAL MEDICINE

## 2017-09-14 PROCEDURE — 85610 PROTHROMBIN TIME: CPT

## 2017-09-14 PROCEDURE — 99211 OFF/OP EST MAY X REQ PHY/QHP: CPT | Performed by: NURSE PRACTITIONER

## 2017-09-14 ASSESSMENT — ENCOUNTER SYMPTOMS
ABDOMINAL PAIN: 0
SHORTNESS OF BREATH: 1
PND: 0
ORTHOPNEA: 0
BRUISES/BLEEDS EASILY: 0
DIZZINESS: 0
DEPRESSION: 0
FALLS: 0
PALPITATIONS: 0
LOSS OF CONSCIOUSNESS: 0

## 2017-09-14 NOTE — PROGRESS NOTES
Subjective:   Francesco Schulte is a 67 y.o. male with polycythemia vera, diabetes and Coronary artery disease who is presenting to clinic for follow-up. Since his last visit with me, he was seen by Dr. Sims in the hematology clinic and he is now off of aspirin. He has been transitioned from Eliquis to Coumadin. He has been tolerating the Coumadin without any problems. He has been compliant with the Brilinta. He continues to be very active on a regular basis without any symptoms. However when he lies down at night at 30° or lower, he starts gasping for air. He also reports waking up at night gasping for air. Denies any lower extremity edema. He has not been adding salt to his meals. Has been trying to gain weight and has gained about 5 pounds since his last MI.    He continues to go off roading every so often. Denies any bleeding episodes.      His cardiac history is as follows -     2008 - records not available for review. It appears that patient presented with an acute MI to  where he underwent PCI to his LAD. He may have underwent POBA to the diagonal as well at this time. I obtained this information from a note dictated by Dr. Roe on 4/11/2016.    February 2014 - presented with anterior STEMI. Cardiac catheterization showed patent left main. patent stents. Moderate obstructive disease in the first diagonal status post angioplasty. Severe obstructive disease in the proximal left circumflex status post PCI with Integrity 2.5 x 26 stent BMS. Patent RCA and mild nonobstructive disease in the ramus.  Severe LV dysfunction with global hypokinesis was noted at this time.    Later in 2014, his Plavix was held for a few days for neck injection. Unfortunately 4 days after stopping the Plavix, patient had an MI and required stenting.    March 2016 - again presented with an MI to . Balloon angioplasty was performed without any stenting. Brilinta was started at this time. I obtained  "this information from a note dictated by Dr. Roe on 4/11/2016.    December 2016 - patient presented with new diagnosis of atrial fibrillation and was started on Eliquis. He was continued on his Brilinta and the aspirin as well.  About 2 weeks later, patient presented with loss of vision on the left side. He was thought to have had a stroke and appears that he recovered spontaneously.     March 2017 - anterior STEMI. Cardiac catheterization showed subacute stent thrombosis and in-stent restenosis of the proximal LAD stent and diffuse disease of the mid LAD. Status post PCI to the proximal and mid LAD with a Xience Alpine 4.0x8 mm and  2.5x38 mm OSWALD respectively. No changes in medications at this time.  He reports being started on polycythemia vera treatment during this visit. Previously, per patient only has hematocrit was being monitored and treated.      Past Medical History:   Diagnosis Date   • Diabetes (CMS-HCC)    • Heart attack (CMS-HCC)    • Ischemic cardiomyopathy    • Kidney stones    • Polycythemia vera (CMS-HCC)    • Stroke (CMS-HCC)      Past Surgical History:   Procedure Laterality Date   • CATH PLACEMENT      right arm   • LAMINOTOMY      discectomy    • OTHER CARDIAC SURGERY      stents x 3     History reviewed. No pertinent family history.     History   Smoking Status   • Former Smoker   Smokeless Tobacco   • Never Used     Allergies   Allergen Reactions   • Metformin Unspecified     \"Similar to a heart attack, I was hospitalized\"   • Simvastatin      Myalgias   • Statins [Hmg-Coa-R Inhibitors]      Muscle ache, joint pain     Outpatient Encounter Prescriptions as of 9/14/2017   Medication Sig Dispense Refill   • warfarin (COUMADIN) 5 MG Tab Take 1 Tab by mouth every day. 30 Tab 3   • metoprolol SR (TOPROL XL) 25 MG TABLET SR 24 HR Take 25 mg in the morning and 12.5 mg in the evening. 45 Tab 11   • hydroxyurea (HYDREA) 500 MG Cap Take 4 Caps by mouth every day. 30 Cap 0   • famotidine (PEPCID) 20 MG " "Tab Take 1 Tab by mouth as needed (after hydrea). 60 Tab 0   • diphenhydrAMINE (BENADRYL) 50 MG Cap Take 50 mg by mouth every 6 hours as needed.     • insulin glargine (LANTUS) 100 UNIT/ML Solution Inject 20 Units as instructed every evening.     • mupirocin calcium (BACTROBAN) 2 % Cream Apply twice a day and/ or after each washing to affected area. 1 Tube 0   • ticagrelor (BRILINTA) 90 MG Tab tablet Take 90 mg by mouth 2 Times a Day.     • tamsulosin (FLOMAX) 0.4 MG capsule Take 0.4 mg by mouth 2 Times a Day.     • finasteride (PROSCAR) 5 MG Tab Take 5 mg by mouth every day.     • colesevelam (WELCHOL) 625 MG Tab Take 625 mg by mouth 2 times a day, with meals.     • glipiZIDE (GLUCOTROL) 10 MG Tab Take 10 mg by mouth every evening.     • hydrocodone-acetaminophen (NORCO) 5-325 MG Tab per tablet Take 1-2 Tabs by mouth every 6 hours as needed. 10 Tab 0     No facility-administered encounter medications on file as of 9/14/2017.      Review of Systems   Constitutional: Negative for malaise/fatigue.   HENT: Negative.    Respiratory: Positive for shortness of breath.    Cardiovascular: Negative for chest pain, palpitations, orthopnea, leg swelling and PND.   Gastrointestinal: Negative for abdominal pain.   Musculoskeletal: Negative for falls.   Skin: Negative.    Neurological: Negative for dizziness and loss of consciousness.   Endo/Heme/Allergies: Does not bruise/bleed easily.   Psychiatric/Behavioral: Negative for depression.   All other systems reviewed and are negative.       Objective:   /78   Pulse 84   Ht 1.85 m (6' 0.83\")   Wt 99.8 kg (220 lb)   SpO2 96%   BMI 29.16 kg/m²     Physical Exam   Constitutional: He is oriented to person, place, and time. No distress.   HENT:   Head: Normocephalic and atraumatic.   Eyes: Conjunctivae are normal.   Neck: Normal range of motion. Neck supple. No JVD present.   Cardiovascular: Normal rate, regular rhythm and normal heart sounds.  Exam reveals no gallop and no " friction rub.    No murmur heard.  Pulmonary/Chest: Effort normal and breath sounds normal. No respiratory distress. He has no wheezes. He has no rales.   Abdominal: Soft. There is no tenderness.   Musculoskeletal: He exhibits no edema.   Neurological: He is alert and oriented to person, place, and time.   Skin: Skin is warm and dry. He is not diaphoretic.   Psychiatric: He has a normal mood and affect.   Nursing note and vitals reviewed.     Echocardiogram in July 2016 at [place], report copied below. Read by   Summary  Technically difficult study.  Normal sized Left Ventricle with moderately reduced systolic function.  Left Ventricle Ejection Fraction 40 to 45%. No Left Ventricular  hypertrophy. Hypokinesis of the apical septum, inferoapical wall, apical  lateral wall and distal anteroapical wall consistent with prior MI in lad  distribution.  Mildly dilated Left Atrium.  Normal sized Right Atrium and normal sized Right Ventricle with normal  systolic function.  Normal Aortic valve without stenosis. No Aortic valve regurgitation.  Normal sized proximal Aorta.  Normal Mitral valve without stenosis. No Mitral valve regurgitation.  Tricuspid valve regurgitation. Right Ventricular Systolic Pressure can not  be accurately assessed.  No pericardial effusion. Normal sized and responsive Inferior Vena Cava  consistent with normal central venous pressure.  compared with echo 6/15, slightly worse EF with with similar distribution  of regional wall motion abnormality.    Echocardiogram in December 2016 at [place], report copied below. Read by Dr. Epstein  Summary  Left ventricular systolic function is mildly reduced and the ejection  fraction is 50-55%.  Thinned out myocardium and akinetic segments of mid to apical anteroseptal  walls and anterior apex are indicative of prior myocardial infarction.  Mildly increased left ventricle cavity size.  Moderately dilated left atrium.  Compared with previous echocardiogram  performed on 7/18/2016, LV function  has mildly improved.    Echocardiogram performed here in March 2017 was reviewed  CONCLUSIONS  Mild reduced left ventricular systolic function.  Left ventricular ejection fraction is visually estimated to be 45%.  Septal wall akinesis.  Right heart pressures are normal.  Aortic sclerosis without stenosis.  Mild aortic insufficiency.    EKG from August 2017 showed sinus bradycardia at 57 bpm, left axis deviation, Q waves in V1 through V3, incomplete left bundle branch block.    Labs from September 2017 was reviewed. Hemoglobin 1.07.    Assessment:     1. Chronic systolic CHF (congestive heart failure) (CMS-[facility])  ECHOCARDIOGRAM COMP W/O CONT       Medical Decision Making:  Today's Assessment / Status / Plan:     Coronary artery disease status post PCI  Ischemic cardiomyopathy (EF 45%)    No recurrent anginal symptoms since last PCI in March. Euvolemic on exam. NYHA class I.Patient is tolerating the Brilinta well. He is not on aspirin since he is also on anticoagulation. Patient thinks the metoprolol might be causing his shortness of breath with lying flat. I have advised him to change his metoprolol from 25 mg every morning and 12.5 mg every afternoon to 37.5 mg every morning. I will also order a repeat echocardiogram to reevaluate his LV function. I will see the patient back for follow-up in about one month. No clear indication for diuretics at this time.    Paroxysmal atrial fibrillation - in sinus today. Denies any recurrent symptoms of atrial fibrillation. Continue metoprolol as noted above. Patient's CHADS-Vasc score is at least 6 (age, DM2, CAD, HF, CVA). Continue Coumadin. No GI/ bleeding.    Return to clinic in 1 month or earlier if needed.    Thank you for allowing me to participate in the care of this patient. Please do not hesitate to contact me with any questions.    Liliana Song MD  Cardiologist  [facility]    PLEASE NOTE: This  dictation was created using voice recognition software.

## 2017-09-18 ENCOUNTER — ANTICOAGULATION VISIT (OUTPATIENT)
Dept: MEDICAL GROUP | Facility: MEDICAL CENTER | Age: 67
End: 2017-09-18
Payer: MEDICARE

## 2017-09-18 VITALS — DIASTOLIC BLOOD PRESSURE: 78 MMHG | HEART RATE: 67 BPM | SYSTOLIC BLOOD PRESSURE: 144 MMHG

## 2017-09-18 DIAGNOSIS — Z79.01 LONG TERM (CURRENT) USE OF ANTICOAGULANTS: ICD-10-CM

## 2017-09-18 DIAGNOSIS — D68.9 COAGULOPATHY (HCC): ICD-10-CM

## 2017-09-18 LAB — INR PPP: 3 (ref 2–3.5)

## 2017-09-18 PROCEDURE — 85610 PROTHROMBIN TIME: CPT | Performed by: FAMILY MEDICINE

## 2017-09-18 NOTE — PROGRESS NOTES
Anticoagulation Summary  As of 9/18/2017    INR goal:   2.0-3.0   TTR:   48.3 % (2.6 wk)   Today's INR:   3.0   Maintenance plan:   2.5 mg (5 mg x 0.5) on Mon, Wed, Fri; 5 mg (5 mg x 1) all other days   Weekly total:   27.5 mg   Plan last modified:   Tobias Encinas, Eileen (9/18/2017)   Next INR check:   10/2/2017   Target end date:   Indefinite    Indications    Coagulopathy (CMS-HCC) [D68.9]  Long term (current) use of anticoagulants [Z79.01] [Z79.01]  A-fib (CMS-HCC) (Resolved) [I48.91]             Anticoagulation Episode Summary     INR check location:   [facility]    Preferred lab:       Send INR reminders to:       Comments:           Anticoagulation Care Providers     Provider Role Specialty Phone number    [facility] Responsible  [phone]        Anticoagulation Patient Findings      HPI:  Francesco Schulte seen in clinic today, on anticoagulation therapy with warfarin for afib  Changes to current medical/health status since last appt: rash on his nose, he thinks it might be due to warfarin, also some rhinitis.   Denies signs/symptoms of bleeding and/or thrombosis since the last appt.    Denies any interval changes to diet  Denies any interval changes to medications since last appt.   Denies any complications or cost restrictions with current therapy.   BP recorded in vitals.    A/P   INR  therapeutic.   Decrease weekly warfarin dose as noted      Follow up appointment in 1 week(s).     Tobias Encinas, RakanD

## 2017-09-27 ENCOUNTER — HOSPITAL ENCOUNTER (OUTPATIENT)
Dept: CARDIOLOGY | Facility: MEDICAL CENTER | Age: 67
End: 2017-09-27
Attending: INTERNAL MEDICINE
Payer: MEDICARE

## 2017-09-27 ENCOUNTER — HOSPITAL ENCOUNTER (OUTPATIENT)
Dept: LAB | Facility: MEDICAL CENTER | Age: 67
End: 2017-09-27
Attending: NURSE PRACTITIONER
Payer: MEDICARE

## 2017-09-27 DIAGNOSIS — I25.10 CORONARY ARTERY DISEASE INVOLVING NATIVE HEART WITHOUT ANGINA PECTORIS, UNSPECIFIED VESSEL OR LESION TYPE: ICD-10-CM

## 2017-09-27 DIAGNOSIS — Z12.5 SCREENING PSA (PROSTATE SPECIFIC ANTIGEN): ICD-10-CM

## 2017-09-27 DIAGNOSIS — I50.22 CHRONIC SYSTOLIC CHF (CONGESTIVE HEART FAILURE) (HCC): ICD-10-CM

## 2017-09-27 LAB
ALBUMIN SERPL BCP-MCNC: 3.9 G/DL (ref 3.2–4.9)
ALBUMIN/GLOB SERPL: 1 G/DL
ALP SERPL-CCNC: 45 U/L (ref 30–99)
ALT SERPL-CCNC: 15 U/L (ref 2–50)
ANION GAP SERPL CALC-SCNC: 8 MMOL/L (ref 0–11.9)
AST SERPL-CCNC: 15 U/L (ref 12–45)
BILIRUB SERPL-MCNC: 0.7 MG/DL (ref 0.1–1.5)
BUN SERPL-MCNC: 16 MG/DL (ref 8–22)
CALCIUM SERPL-MCNC: 9.7 MG/DL (ref 8.5–10.5)
CHLORIDE SERPL-SCNC: 105 MMOL/L (ref 96–112)
CHOLEST SERPL-MCNC: 142 MG/DL (ref 100–199)
CO2 SERPL-SCNC: 24 MMOL/L (ref 20–33)
CREAT SERPL-MCNC: 0.91 MG/DL (ref 0.5–1.4)
CREAT UR-MCNC: 139.1 MG/DL
EST. AVERAGE GLUCOSE BLD GHB EST-MCNC: 169 MG/DL
GFR SERPL CREATININE-BSD FRML MDRD: >60 ML/MIN/1.73 M 2
GLOBULIN SER CALC-MCNC: 3.9 G/DL (ref 1.9–3.5)
GLUCOSE SERPL-MCNC: 130 MG/DL (ref 65–99)
HBA1C MFR BLD: 7.5 % (ref 0–5.6)
HDLC SERPL-MCNC: 32 MG/DL
LDLC SERPL CALC-MCNC: 93 MG/DL
MICROALBUMIN UR-MCNC: 4.1 MG/DL
MICROALBUMIN/CREAT UR: 29 MG/G (ref 0–30)
POTASSIUM SERPL-SCNC: 3.9 MMOL/L (ref 3.6–5.5)
PROT SERPL-MCNC: 7.8 G/DL (ref 6–8.2)
PSA SERPL-MCNC: 0.53 NG/ML (ref 0–4)
SODIUM SERPL-SCNC: 137 MMOL/L (ref 135–145)
TRIGL SERPL-MCNC: 87 MG/DL (ref 0–149)

## 2017-09-27 PROCEDURE — 36415 COLL VENOUS BLD VENIPUNCTURE: CPT

## 2017-09-27 PROCEDURE — 93306 TTE W/DOPPLER COMPLETE: CPT

## 2017-09-27 PROCEDURE — 80053 COMPREHEN METABOLIC PANEL: CPT

## 2017-09-27 PROCEDURE — 84153 ASSAY OF PSA TOTAL: CPT | Mod: GA

## 2017-09-27 PROCEDURE — 80061 LIPID PANEL: CPT

## 2017-09-27 PROCEDURE — 82043 UR ALBUMIN QUANTITATIVE: CPT

## 2017-09-27 PROCEDURE — 83036 HEMOGLOBIN GLYCOSYLATED A1C: CPT | Mod: GA

## 2017-09-27 PROCEDURE — 82570 ASSAY OF URINE CREATININE: CPT

## 2017-09-28 ENCOUNTER — TELEPHONE (OUTPATIENT)
Dept: CARDIOLOGY | Facility: MEDICAL CENTER | Age: 67
End: 2017-09-28

## 2017-09-28 LAB
LV EJECT FRACT  99904: 45
LV EJECT FRACT MOD 2C 99903: 30.99
LV EJECT FRACT MOD 4C 99902: 46.97
LV EJECT FRACT MOD BP 99901: 41.19

## 2017-09-28 NOTE — TELEPHONE ENCOUNTER
L/m notifying pt of Echo results. Educated pt to please call back if any questions. Pt has FU with Dr. Song 10/19.    ----- Message from Liliana Song M.D. sent at 9/28/2017 12:58 PM PDT -----  Reviewed echocardiogram. No changes from before.   No change in management at this time.   Thank you   AA

## 2017-10-02 ENCOUNTER — ANTICOAGULATION VISIT (OUTPATIENT)
Dept: MEDICAL GROUP | Facility: MEDICAL CENTER | Age: 67
End: 2017-10-02
Payer: MEDICARE

## 2017-10-02 ENCOUNTER — TELEPHONE (OUTPATIENT)
Dept: MEDICAL GROUP | Facility: MEDICAL CENTER | Age: 67
End: 2017-10-02

## 2017-10-02 VITALS — HEART RATE: 70 BPM | DIASTOLIC BLOOD PRESSURE: 90 MMHG | SYSTOLIC BLOOD PRESSURE: 143 MMHG

## 2017-10-02 DIAGNOSIS — Z79.4 TYPE 2 DIABETES MELLITUS WITH OTHER CIRCULATORY COMPLICATION, WITH LONG-TERM CURRENT USE OF INSULIN (HCC): ICD-10-CM

## 2017-10-02 DIAGNOSIS — E11.59 TYPE 2 DIABETES MELLITUS WITH OTHER CIRCULATORY COMPLICATION, WITH LONG-TERM CURRENT USE OF INSULIN (HCC): ICD-10-CM

## 2017-10-02 DIAGNOSIS — D68.9 COAGULOPATHY (HCC): ICD-10-CM

## 2017-10-02 LAB — INR PPP: 2.3 (ref 2–3.5)

## 2017-10-02 PROCEDURE — 85610 PROTHROMBIN TIME: CPT | Performed by: FAMILY MEDICINE

## 2017-10-02 PROCEDURE — 99211 OFF/OP EST MAY X REQ PHY/QHP: CPT | Performed by: FAMILY MEDICINE

## 2017-10-02 RX ORDER — NAPROXEN SODIUM 220 MG
1 TABLET ORAL DAILY
Qty: 100 EACH | Refills: 5 | Status: SHIPPED | OUTPATIENT
Start: 2017-10-02 | End: 2018-03-10

## 2017-10-02 RX ORDER — WARFARIN SODIUM 4 MG/1
4-6 TABLET ORAL DAILY
Qty: 135 TAB | Refills: 1 | Status: SHIPPED | OUTPATIENT
Start: 2017-10-02 | End: 2018-01-09

## 2017-10-02 NOTE — TELEPHONE ENCOUNTER
Pt informed.  Patient states his a1c was 6.2 the last he checked with claudine, he also states he was in an accident and since then his sugar is all over the place.    He would also like a refill on his syringes 6ml 0.5 ml 31 g    He would also like to know how he gets ride of used needles?

## 2017-10-02 NOTE — TELEPHONE ENCOUNTER
----- Message from ALVINO Camilo sent at 9/29/2017  5:37 PM PDT -----  Please notify patient that his cholesterol panel was much improved from 6 months ago, his A1c was 7.5 which means his average blood glucose level is 169. His liver and kidney function, electrolytes, and PSA were all normal.    ALVINO Camilo

## 2017-10-02 NOTE — TELEPHONE ENCOUNTER
Refill on syringes sent  I believe he can dispose of used to syringes either at the pharmacy or bring them to our office  We will recheck labs in 6 months, I have placed orders.

## 2017-10-03 DIAGNOSIS — Z95.5 STATUS POST INSERTION OF DRUG-ELUTING STENT INTO LEFT ANTERIOR DESCENDING (LAD) ARTERY FOR CORONARY ARTERY DISEASE: ICD-10-CM

## 2017-10-19 ENCOUNTER — OFFICE VISIT (OUTPATIENT)
Dept: CARDIOLOGY | Facility: MEDICAL CENTER | Age: 67
End: 2017-10-19
Payer: MEDICARE

## 2017-10-19 VITALS
BODY MASS INDEX: 29.69 KG/M2 | SYSTOLIC BLOOD PRESSURE: 110 MMHG | DIASTOLIC BLOOD PRESSURE: 62 MMHG | WEIGHT: 224 LBS | HEIGHT: 73 IN | HEART RATE: 74 BPM | OXYGEN SATURATION: 97 %

## 2017-10-19 DIAGNOSIS — I50.22 CHRONIC SYSTOLIC CHF (CONGESTIVE HEART FAILURE) (HCC): ICD-10-CM

## 2017-10-19 DIAGNOSIS — I25.10 CORONARY ARTERY DISEASE INVOLVING NATIVE HEART WITHOUT ANGINA PECTORIS, UNSPECIFIED VESSEL OR LESION TYPE: ICD-10-CM

## 2017-10-19 PROCEDURE — 99214 OFFICE O/P EST MOD 30 MIN: CPT | Performed by: INTERNAL MEDICINE

## 2017-10-19 RX ORDER — CARVEDILOL 3.12 MG/1
3.12 TABLET ORAL 2 TIMES DAILY WITH MEALS
Qty: 60 TAB | Refills: 11 | Status: SHIPPED | OUTPATIENT
Start: 2017-10-19 | End: 2017-10-31

## 2017-10-19 ASSESSMENT — ENCOUNTER SYMPTOMS
BRUISES/BLEEDS EASILY: 0
ORTHOPNEA: 0
DEPRESSION: 0
SHORTNESS OF BREATH: 1
PND: 0
FALLS: 0
PALPITATIONS: 0
LOSS OF CONSCIOUSNESS: 0
DIZZINESS: 0
ABDOMINAL PAIN: 0

## 2017-10-19 NOTE — PROGRESS NOTES
Subjective:   Francesco Schulte is a 67 y.o. male with polycythemia vera, diabetes and Coronary artery disease who is presenting to clinic for follow-up. His main concern today is ongoing dyspnea that occurs usually at rest or when he lies down at night. He has no symptoms with exertion. He is very active on a regular basis. He was previously on metoprolol and reports having the same symptoms. His symptoms resolved after his metoprolol was discontinued. His symptoms have improved since he has decrease the metoprolol to 12.5 once a day.    History pressure at home is usually 110s. Recently it goes up to the mid 140s after exertion and states that even after he has rested for a while.    He has been compliant with his Coumadin and his Brilinta. Denies any bleeding issues.    He continues to go off roading but has stopped going as often.      His cardiac history is as follows -     2008 - records not available for review. It appears that patient presented with an acute MI to [facility] where he underwent PCI to his LAD. He may have underwent POBA to the diagonal as well at this time. I obtained this information from a note dictated by Dr. Roe on 4/11/2016.    February 2014 - presented with anterior STEMI. Cardiac catheterization showed patent left main. patent stents. Moderate obstructive disease in the first diagonal status post angioplasty. Severe obstructive disease in the proximal left circumflex status post PCI with Integrity 2.5 x 26 stent BMS. Patent RCA and mild nonobstructive disease in the ramus.  Severe LV dysfunction with global hypokinesis was noted at this time.    Later in 2014, his Plavix was held for a few days for neck injection. Unfortunately 4 days after stopping the Plavix, patient had an MI and required stenting.    March 2016 - again presented with an MI to [facility]. Balloon angioplasty was performed without any stenting. Brilinta was started at this time. I obtained this  "information from a note dictated by Dr. Roe on 4/11/2016.    December 2016 - patient presented with new diagnosis of atrial fibrillation and was started on Eliquis. He was continued on his Brilinta and the aspirin as well.  About 2 weeks later, patient presented with loss of vision on the left side. He was thought to have had a stroke and appears that he recovered spontaneously.     March 2017 - anterior STEMI. Cardiac catheterization showed subacute stent thrombosis and in-stent restenosis of the proximal LAD stent and diffuse disease of the mid LAD. Status post PCI to the proximal and mid LAD with a Xience Alpine 4.0x8 mm and  2.5x38 mm OSWALD respectively. No changes in medications at this time.  He reports being started on polycythemia vera treatment during this visit. Previously, per patient only has hematocrit was being monitored and treated.      Past Medical History:   Diagnosis Date   • Diabetes (CMS-[facility])    • Heart attack    • Ischemic cardiomyopathy    • Kidney stones    • Polycythemia vera(238.4)    • Stroke (CMS-[facility])      Past Surgical History:   Procedure Laterality Date   • CATH PLACEMENT      right arm   • LAMINOTOMY      discectomy    • OTHER CARDIAC SURGERY      stents x 3     History reviewed. No pertinent family history.     History   Smoking Status   • Former Smoker   Smokeless Tobacco   • Never Used     Allergies   Allergen Reactions   • Metformin Unspecified     \"Similar to a heart attack, I was hospitalized\"   • Simvastatin      Myalgias   • Statins [Hmg-Coa-R Inhibitors]      Muscle ache, joint pain     Outpatient Encounter Prescriptions as of 10/19/2017   Medication Sig Dispense Refill   • carvedilol (COREG) 3.125 MG Tab Take 1 Tab by mouth 2 times a day, with meals. 60 Tab 11   • ticagrelor (BRILINTA) 90 MG Tab tablet Take 1 Tab by mouth 2 Times a Day. 180 Tab 3   • warfarin (COUMADIN) 4 MG Tab Take 1-1.5 Tabs by mouth every day. 135 Tab 1   • Insulin Syringe-Needle U-100 (INSULIN SYRINGE " ".5CC/31GX5/16\") 31G X 5/16\" 0.5 ML Misc 1 Each by Does not apply route every day. 100 Each 5   • warfarin (COUMADIN) 5 MG Tab Take 1 Tab by mouth every day. 30 Tab 3   • hydroxyurea (HYDREA) 500 MG Cap Take 4 Caps by mouth every day. 30 Cap 0   • famotidine (PEPCID) 20 MG Tab Take 1 Tab by mouth as needed (after hydrea). 60 Tab 0   • diphenhydrAMINE (BENADRYL) 50 MG Cap Take 50 mg by mouth every 6 hours as needed.     • insulin glargine (LANTUS) 100 UNIT/ML Solution Inject 20 Units as instructed every evening.     • mupirocin calcium (BACTROBAN) 2 % Cream Apply twice a day and/ or after each washing to affected area. 1 Tube 0   • tamsulosin (FLOMAX) 0.4 MG capsule Take 0.4 mg by mouth 2 Times a Day.     • finasteride (PROSCAR) 5 MG Tab Take 5 mg by mouth every day.     • colesevelam (WELCHOL) 625 MG Tab Take 625 mg by mouth 2 times a day, with meals.     • glipiZIDE (GLUCOTROL) 10 MG Tab Take 10 mg by mouth every evening.     • [DISCONTINUED] metoprolol SR (TOPROL XL) 25 MG TABLET SR 24 HR Take 25 mg in the morning and 12.5 mg in the evening. 45 Tab 11   • hydrocodone-acetaminophen (NORCO) 5-325 MG Tab per tablet Take 1-2 Tabs by mouth every 6 hours as needed. 10 Tab 0     No facility-administered encounter medications on file as of 10/19/2017.      Review of Systems   Constitutional: Negative for malaise/fatigue.   HENT: Negative.    Respiratory: Positive for shortness of breath.    Cardiovascular: Negative for chest pain, palpitations, orthopnea, leg swelling and PND.   Gastrointestinal: Negative for abdominal pain.   Musculoskeletal: Negative for falls.   Skin: Negative.    Neurological: Negative for dizziness and loss of consciousness.   Endo/Heme/Allergies: Does not bruise/bleed easily.   Psychiatric/Behavioral: Negative for depression.   All other systems reviewed and are negative.       Objective:   /62   Pulse 74   Ht 1.85 m (6' 0.83\")   Wt 101.6 kg (224 lb)   SpO2 97%   BMI 29.69 kg/m² "     Physical Exam   Constitutional: He is oriented to person, place, and time. No distress.   HENT:   Head: Normocephalic and atraumatic.   Eyes: Conjunctivae are normal.   Neck: Normal range of motion. Neck supple. No JVD present.   Cardiovascular: Normal rate, regular rhythm and normal heart sounds.  Exam reveals no gallop and no friction rub.    No murmur heard.  Pulmonary/Chest: Effort normal and breath sounds normal. No respiratory distress. He has no wheezes. He has no rales.   Abdominal: Soft. There is no tenderness.   Musculoskeletal: He exhibits no edema.   Neurological: He is alert and oriented to person, place, and time.   Skin: Skin is warm and dry. He is not diaphoretic.   Psychiatric: He has a normal mood and affect.   Nursing note and vitals reviewed.     Echocardiogram in July 2016 at [place], report copied below. Read by   Summary  Technically difficult study.  Normal sized Left Ventricle with moderately reduced systolic function.  Left Ventricle Ejection Fraction 40 to 45%. No Left Ventricular  hypertrophy. Hypokinesis of the apical septum, inferoapical wall, apical  lateral wall and distal anteroapical wall consistent with prior MI in lad  distribution.  Mildly dilated Left Atrium.  Normal sized Right Atrium and normal sized Right Ventricle with normal  systolic function.  Normal Aortic valve without stenosis. No Aortic valve regurgitation.  Normal sized proximal Aorta.  Normal Mitral valve without stenosis. No Mitral valve regurgitation.  Tricuspid valve regurgitation. Right Ventricular Systolic Pressure can not  be accurately assessed.  No pericardial effusion. Normal sized and responsive Inferior Vena Cava  consistent with normal central venous pressure.  compared with echo 6/15, slightly worse EF with with similar distribution  of regional wall motion abnormality.    Echocardiogram in December 2016 at [place], report copied below. Read by Dr. Epstein  Summary  Left ventricular systolic  function is mildly reduced and the ejection  fraction is 50-55%.  Thinned out myocardium and akinetic segments of mid to apical anteroseptal  walls and anterior apex are indicative of prior myocardial infarction.  Mildly increased left ventricle cavity size.  Moderately dilated left atrium.  Compared with previous echocardiogram performed on 7/18/2016, LV function  has mildly improved.    Echocardiogram performed here in March 2017 was reviewed  CONCLUSIONS  Mild reduced left ventricular systolic function.  Left ventricular ejection fraction is visually estimated to be 45%.  Septal wall akinesis.  Right heart pressures are normal.  Aortic sclerosis without stenosis.  Mild aortic insufficiency.    EKG from August 2017 showed sinus bradycardia at 57 bpm, left axis deviation, Q waves in V1 through V3, incomplete left bundle branch block.    Labs from September 2017 was reviewed. Hemoglobin 1.07.    Echocardiogram performed in September 2017 was reviewed  CONCLUSIONS  Prior echo done on 03-. No significant change noted compared to   prior study.   Mildly reduced left ventricular systolic function.  Left ventricular ejection fraction is visually estimated to be 45% with   wall motion abnormalities as noted above.  Grade I diastolic dysfunction.  Normal inferior vena cava size and inspiratory collapse.    Assessment:     1. Chronic systolic CHF (congestive heart failure) (CMS-HCC)     2. Coronary artery disease involving native heart without angina pectoris, unspecified vessel or lesion type         Medical Decision Making:  Today's Assessment / Status / Plan:     Coronary artery disease status post PCI  Ischemic cardiomyopathy (EF 45%)    No recurrent anginal symptoms since last PCI in March. Euvolemic on exam. NYHA class I. Patient is tolerating the Brilinta well. He is not on aspirin since he is also on anticoagulation.  As discussed previously, he thinks the metoprolol is causing his shortness of breath. I  doubt that metoprolol is the cause of his symptoms, however patient reports a very good history of being started on metoprolol having recurrent symptoms and resolution of symptoms when his metoprolol dose was discontinued. Per patient request, I have discontinued his metoprolol. I will instead start him on carvedilol to help with blood pressure as well. I will see the patient for reevaluation in one month.    She has paroxysmal atrial fibrillation. Continues to be in sinus rhythm today. Change of beta blocker as noted above. Patient's CHADS-Vasc score is at least 6 (age, DM2, CAD, HF, CVA). Continue Coumadin. No GI/ bleeding.  Patient has multiple questions about atrial fibrillation. He wants to know what to do should he have atrial fibrillation while he is on a flight. He was previously told by a physician to take metoprolol 50 mg once should he go into atrial fibrillation. I have advised the patient against using metoprolol as needed. Class Ic antiarrhythmics are not options for this patient since he has significant CAD and cardiomyopathy. I discussed the option of addition of amiodarone or digoxin but he does not want to take anything regularly.    Return to clinic in 1 month or earlier if needed.    Thank you for allowing me to participate in the care of this patient. Please do not hesitate to contact me with any questions.    Liliana Song MD  Cardiologist  [facility]    PLEASE NOTE: This dictation was created using voice recognition software.

## 2017-10-23 ENCOUNTER — ANTICOAGULATION VISIT (OUTPATIENT)
Dept: MEDICAL GROUP | Facility: MEDICAL CENTER | Age: 67
End: 2017-10-23
Payer: MEDICARE

## 2017-10-23 VITALS — HEART RATE: 79 BPM | DIASTOLIC BLOOD PRESSURE: 73 MMHG | SYSTOLIC BLOOD PRESSURE: 120 MMHG

## 2017-10-23 DIAGNOSIS — D68.9 COAGULOPATHY (HCC): ICD-10-CM

## 2017-10-23 DIAGNOSIS — Z79.01 LONG TERM CURRENT USE OF ANTICOAGULANT THERAPY: ICD-10-CM

## 2017-10-23 LAB — INR PPP: 2.4 (ref 2–3.5)

## 2017-10-23 PROCEDURE — 85610 PROTHROMBIN TIME: CPT | Performed by: FAMILY MEDICINE

## 2017-10-23 PROCEDURE — 99211 OFF/OP EST MAY X REQ PHY/QHP: CPT | Performed by: FAMILY MEDICINE

## 2017-10-23 NOTE — PROGRESS NOTES
Anticoagulation Summary  As of 10/23/2017    INR goal:   2.0-3.0   TTR:   82.2 % (1.8 mo)   Today's INR:   2.4   Maintenance plan:   6 mg (4 mg x 1.5) on Mon; 4 mg (4 mg x 1) all other days   Weekly total:   30 mg   Plan last modified:   Tobias Encinas, PharmD (10/23/2017)   Next INR check:   11/13/2017   Target end date:   Indefinite    Indications    Coagulopathy (CMS-HCC) [D68.9]  Long term (current) use of anticoagulants [Z79.01] [Z79.01]  A-fib (CMS-[facility]) (Resolved) [I48.91]             Anticoagulation Episode Summary     INR check location:   [facility]    Preferred lab:       Send INR reminders to:       Comments:           Anticoagulation Care Providers     Provider Role Specialty Phone number    [facility] Responsible  [phone]        Anticoagulation Patient Findings      HPI:  Francesco Schulte seen in clinic today, on anticoagulation therapy with warfarin for afib  Changes to current medical/health status since last appt:   Denies signs/symptoms of bleeding and/or thrombosis since the last appt.    Denies any interval changes to diet  Denies any interval changes to medications since last appt.   Denies any complications or cost restrictions with current therapy.   BP recorded in vitals.    A/P   INR  therapeutic.   Increase weekly warfarin dose as noted per pt to target 3      Follow up appointment in 3 week(s).     Tobias Encinas, PharmD

## 2017-10-30 ENCOUNTER — TELEPHONE (OUTPATIENT)
Dept: CARDIOLOGY | Facility: MEDICAL CENTER | Age: 67
End: 2017-10-30

## 2017-10-30 NOTE — TELEPHONE ENCOUNTER
"S/w pt about carvedilol. Reports that while taking medication he felt very dizzy to the point of near syncope. States that his BP dropped down to 90/60 and after resting his BP increased to 168/110. Pt continued to take medication for about 8 days with no changes. Stopped medication 3 days ago and reports he feel 100% better. States BP back to 130/60 HR 78-86.     He would like to change to alternate medication per Dr. Song's recommendation. To AA, please advise.    ----- Message from Silvestre Salcedo sent at 10/30/2017 10:20 AM PDT -----  Regarding: Symptoms to new medication   Contact: [phone]  AA/Manju    Pt is calling to report symptoms to new medication(carvedilol). States he tried same medication year's ago and had same symptoms as dizziness, hot/cold sweats, b/p up and down, also chest pain. States he stopped medication 3 days ago and feel's \"1.000% better\". States AA did mention another medication he could try. Please call [phone].    "

## 2017-10-31 DIAGNOSIS — I48.0 PAROXYSMAL ATRIAL FIBRILLATION (HCC): ICD-10-CM

## 2017-10-31 RX ORDER — BISOPROLOL FUMARATE 5 MG/1
2.5 TABLET, FILM COATED ORAL DAILY
Qty: 15 TAB | Refills: 11 | OUTPATIENT
Start: 2017-10-31 | End: 2017-12-18 | Stop reason: SDUPTHER

## 2017-10-31 NOTE — TELEPHONE ENCOUNTER
Pt notified of Dr. Song's recommendation. He is OK with trying Bisoprolol 2.5mg daily. Pt educated on medication and questions answered. He denies further needs at this time, and will call if any issues.     Order placed for Bisoprolol 2.5mg PO daily #15 with 11 refills and medication called into pharmacy per pt request. Carvedilol d/c on eMAR

## 2017-11-13 ENCOUNTER — ANTICOAGULATION VISIT (OUTPATIENT)
Dept: MEDICAL GROUP | Facility: MEDICAL CENTER | Age: 67
End: 2017-11-13
Payer: MEDICARE

## 2017-11-13 VITALS — DIASTOLIC BLOOD PRESSURE: 75 MMHG | HEART RATE: 76 BPM | SYSTOLIC BLOOD PRESSURE: 125 MMHG

## 2017-11-13 DIAGNOSIS — Z79.01 LONG TERM CURRENT USE OF ANTICOAGULANT THERAPY: ICD-10-CM

## 2017-11-13 DIAGNOSIS — D68.9 COAGULOPATHY (HCC): ICD-10-CM

## 2017-11-13 LAB — INR PPP: 3.4 (ref 2–3.5)

## 2017-11-13 PROCEDURE — 85610 PROTHROMBIN TIME: CPT | Performed by: FAMILY MEDICINE

## 2017-11-13 PROCEDURE — 99211 OFF/OP EST MAY X REQ PHY/QHP: CPT | Performed by: FAMILY MEDICINE

## 2017-11-13 NOTE — PROGRESS NOTES
Anticoagulation Summary  As of 11/13/2017    INR goal:   2.0-3.0   TTR:   75.9 % (2.5 mo)   Today's INR:   3.4!   Maintenance plan:   4 mg (4 mg x 1) every day   Weekly total:   28 mg   Plan last modified:   Tobias Encinas, PharmD (11/13/2017)   Next INR check:   12/11/2017   Target end date:   Indefinite    Indications    Coagulopathy (CMS-HCC) [D68.9]  Long term (current) use of anticoagulants [Z79.01] [Z79.01]  A-fib (CMS-HCC) (Resolved) [I48.91]             Anticoagulation Episode Summary     [facility] check [facility]   [facility]    Preferred lab:       Send INR reminders to:       Comments:           Anticoagulation Care Providers     Provider Role Specialty Phone number    [facility] Responsible  [phone]        Anticoagulation Patient Findings      HPI:  Francesco Schulte seen in clinic today, on anticoagulation therapy with warfarin for afib  Changes to current medical/health status since last appt:   Denies signs/symptoms of bleeding and/or thrombosis since the last appt.    Denies any interval changes to diet  Denies any interval changes to medications since last appt.   Denies any complications or cost restrictions with current therapy.   BP recorded in vitals.    A/P   INR  supra-therapeutic.   Decrease weekly warfarin dose as noted    Follow up appointment in 4 week(s).     Tobias Encinas, PharmD

## 2017-11-29 ENCOUNTER — OFFICE VISIT (OUTPATIENT)
Dept: CARDIOLOGY | Facility: MEDICAL CENTER | Age: 67
End: 2017-11-29
Payer: MEDICARE

## 2017-11-29 VITALS
WEIGHT: 223 LBS | BODY MASS INDEX: 29.55 KG/M2 | HEIGHT: 73 IN | OXYGEN SATURATION: 95 % | HEART RATE: 98 BPM | DIASTOLIC BLOOD PRESSURE: 64 MMHG | SYSTOLIC BLOOD PRESSURE: 112 MMHG

## 2017-11-29 DIAGNOSIS — I48.0 PAROXYSMAL ATRIAL FIBRILLATION (HCC): ICD-10-CM

## 2017-11-29 DIAGNOSIS — I25.10 CORONARY ARTERY DISEASE INVOLVING NATIVE HEART WITHOUT ANGINA PECTORIS, UNSPECIFIED VESSEL OR LESION TYPE: ICD-10-CM

## 2017-11-29 PROCEDURE — 99215 OFFICE O/P EST HI 40 MIN: CPT | Performed by: INTERNAL MEDICINE

## 2017-11-29 ASSESSMENT — ENCOUNTER SYMPTOMS
DEPRESSION: 0
PALPITATIONS: 0
BRUISES/BLEEDS EASILY: 0
DIZZINESS: 1
SHORTNESS OF BREATH: 0
FALLS: 0
PND: 0
ABDOMINAL PAIN: 0
LOSS OF CONSCIOUSNESS: 0
ORTHOPNEA: 0

## 2017-11-29 NOTE — PROGRESS NOTES
Subjective:   Francesco Schulte is a 67 y.o. male with polycythemia vera, diabetes and Coronary artery disease who is presenting to clinic for follow-up. At his last visit, patient had reported a history of dyspnea with metoprolol. At that time he was switched from metoprolol to bisoprolol. Since being switched to bisoprolol, his shortness of breath has essentially resolved. However he now notes dizziness with standing up after resting for a long time.    Otherwise feels well overall. Denies any chest discomfort or dizziness.    His blood pressure at home is usually 110s to 120s systolic.    He has been compliant with his Coumadin and his Brilinta. Denies any bleeding issues.    He continues to go off roading. Wants to know if he can get something faster acting than vitamin K in case he starts bleeding.      His cardiac history is as follows -     2008 - records not available for review. It appears that patient presented with an acute MI to [facility] where he underwent PCI to his LAD. He may have underwent POBA to the diagonal as well at this time. I obtained this information from a note dictated by Dr. Roe on 4/11/2016.    February 2014 - presented with anterior STEMI. Cardiac catheterization showed patent left main. patent stents. Moderate obstructive disease in the first diagonal status post angioplasty. Severe obstructive disease in the proximal left circumflex status post PCI with Integrity 2.5 x 26 stent BMS. Patent RCA and mild nonobstructive disease in the ramus.  Severe LV dysfunction with global hypokinesis was noted at this time.    Later in 2014, his Plavix was held for a few days for neck injection. Unfortunately 4 days after stopping the Plavix, patient had an MI and required stenting.    March 2016 - again presented with an MI to [facility]. Balloon angioplasty was performed without any stenting. Brilinta was started at this time. I obtained this information from a note dictated  "by Dr. Roe on 4/11/2016.    December 2016 - patient presented with new diagnosis of atrial fibrillation and was started on Eliquis. He was continued on his Brilinta and the aspirin as well.  About 2 weeks later, patient presented with loss of vision on the left side. He was thought to have had a stroke and appears that he recovered spontaneously.     March 2017 - anterior STEMI. Cardiac catheterization showed subacute stent thrombosis and in-stent restenosis of the proximal LAD stent and diffuse disease of the mid LAD. Status post PCI to the proximal and mid LAD with a Xience Alpine 4.0x8 mm and  2.5x38 mm OSWALD respectively. No changes in medications at this time.  He reports being started on polycythemia vera treatment during this visit. Previously, per patient only has hematocrit was being monitored and treated.      Past Medical History:   Diagnosis Date   • Diabetes (CMS-[facility])    • Heart attack    • Ischemic cardiomyopathy    • Kidney stones    • Polycythemia vera(238.4)    • Stroke (CMS-[facility])      Past Surgical History:   Procedure Laterality Date   • CATH PLACEMENT      right arm   • LAMINOTOMY      discectomy    • OTHER CARDIAC SURGERY      stents x 3     History reviewed. No pertinent family history.     History   Smoking Status   • Former Smoker   Smokeless Tobacco   • Never Used     Allergies   Allergen Reactions   • Metformin Unspecified     \"Similar to a heart attack, I was hospitalized\"   • Simvastatin      Myalgias   • Statins [Hmg-Coa-R Inhibitors]      Muscle ache, joint pain     Outpatient Encounter Prescriptions as of 11/29/2017   Medication Sig Dispense Refill   • bisoprolol (ZEBETA) 5 MG Tab Take 0.5 Tabs by mouth every day. 15 Tab 11   • ticagrelor (BRILINTA) 90 MG Tab tablet Take 1 Tab by mouth 2 Times a Day. 180 Tab 3   • warfarin (COUMADIN) 4 MG Tab Take 1-1.5 Tabs by mouth every day. 135 Tab 1   • Insulin Syringe-Needle U-100 (INSULIN SYRINGE .5CC/31GX5/16\") 31G X 5/16\" 0.5 ML Misc 1 Each by " "Does not apply route every day. 100 Each 5   • warfarin (COUMADIN) 5 MG Tab Take 1 Tab by mouth every day. 30 Tab 3   • hydroxyurea (HYDREA) 500 MG Cap Take 4 Caps by mouth every day. 30 Cap 0   • famotidine (PEPCID) 20 MG Tab Take 1 Tab by mouth as needed (after hydrea). 60 Tab 0   • diphenhydrAMINE (BENADRYL) 50 MG Cap Take 50 mg by mouth every 6 hours as needed.     • insulin glargine (LANTUS) 100 UNIT/ML Solution Inject 20 Units as instructed every evening.     • mupirocin calcium (BACTROBAN) 2 % Cream Apply twice a day and/ or after each washing to affected area. 1 Tube 0   • tamsulosin (FLOMAX) 0.4 MG capsule Take 0.4 mg by mouth 2 Times a Day.     • finasteride (PROSCAR) 5 MG Tab Take 5 mg by mouth every day.     • colesevelam (WELCHOL) 625 MG Tab Take 625 mg by mouth 2 times a day, with meals.     • glipiZIDE (GLUCOTROL) 10 MG Tab Take 10 mg by mouth every evening.     • hydrocodone-acetaminophen (NORCO) 5-325 MG Tab per tablet Take 1-2 Tabs by mouth every 6 hours as needed. 10 Tab 0     No facility-administered encounter medications on file as of 11/29/2017.      Review of Systems   Constitutional: Negative for malaise/fatigue.   HENT: Negative.    Respiratory: Negative for shortness of breath.    Cardiovascular: Negative for chest pain, palpitations, orthopnea, leg swelling and PND.   Gastrointestinal: Negative for abdominal pain.   Musculoskeletal: Negative for falls.   Skin: Negative.    Neurological: Positive for dizziness. Negative for loss of consciousness.   Endo/Heme/Allergies: Does not bruise/bleed easily.   Psychiatric/Behavioral: Negative for depression.   All other systems reviewed and are negative.       Objective:   /64   Pulse 98   Ht 1.85 m (6' 0.83\")   Wt 101.2 kg (223 lb)   SpO2 95%   BMI 29.56 kg/m²     Physical Exam   Constitutional: He is oriented to person, place, and time. No distress.   HENT:   Head: Normocephalic and atraumatic.   Eyes: Conjunctivae are normal.   Neck: " Normal range of motion. Neck supple. No JVD present.   Cardiovascular: Normal rate, regular rhythm and normal heart sounds.  Exam reveals no gallop and no friction rub.    No murmur heard.  Pulmonary/Chest: Effort normal and breath sounds normal. No respiratory distress. He has no wheezes. He has no rales.   Abdominal: Soft. There is no tenderness.   Musculoskeletal: He exhibits no edema.   Neurological: He is alert and oriented to person, place, and time.   Skin: Skin is warm and dry. He is not diaphoretic.   Psychiatric: He has a normal mood and affect.   Nursing note and vitals reviewed.     Echocardiogram in July 2016 at [facility], report copied below. Read by   Summary  Technically difficult study.  Normal sized Left Ventricle with moderately reduced systolic function.  Left Ventricle Ejection Fraction 40 to 45%. No Left Ventricular  hypertrophy. Hypokinesis of the apical septum, inferoapical wall, apical  lateral wall and distal anteroapical wall consistent with prior MI in lad  distribution.  Mildly dilated Left Atrium.  Normal sized Right Atrium and normal sized Right Ventricle with normal  systolic function.  Normal Aortic valve without stenosis. No Aortic valve regurgitation.  Normal sized proximal Aorta.  Normal Mitral valve without stenosis. No Mitral valve regurgitation.  Tricuspid valve regurgitation. Right Ventricular Systolic Pressure can not  be accurately assessed.  No pericardial effusion. Normal sized and responsive Inferior Vena Cava  consistent with normal central venous pressure.  compared with echo 6/15, slightly worse EF with with similar distribution  of regional wall motion abnormality.    Echocardiogram in December 2016 at [facility], report copied below. Read by Dr. Epstein  Summary  Left ventricular systolic function is mildly reduced and the ejection  fraction is 50-55%.  Thinned out myocardium and akinetic segments of mid to apical anteroseptal  walls and anterior apex are  indicative of prior myocardial infarction.  Mildly increased left ventricle cavity size.  Moderately dilated left atrium.  Compared with previous echocardiogram performed on 7/18/2016, LV function  has mildly improved.    Echocardiogram performed here in March 2017 was reviewed  CONCLUSIONS  Mild reduced left ventricular systolic function.  Left ventricular ejection fraction is visually estimated to be 45%.  Septal wall akinesis.  Right heart pressures are normal.  Aortic sclerosis without stenosis.  Mild aortic insufficiency.    EKG from August 2017 showed sinus bradycardia at 57 bpm, left axis deviation, Q waves in V1 through V3, incomplete left bundle branch block.    Labs from September 2017 was reviewed. Hemoglobin 1.07.    Echocardiogram performed in September 2017 was reviewed  CONCLUSIONS  Prior echo done on 03-. No significant change noted compared to   prior study.   Mildly reduced left ventricular systolic function.  Left ventricular ejection fraction is visually estimated to be 45% with   wall motion abnormalities as noted above.  Grade I diastolic dysfunction.  Normal inferior vena cava size and inspiratory collapse.    Assessment:     1. Paroxysmal atrial fibrillation (CMS-HCC)     2. Coronary artery disease involving native heart without angina pectoris, unspecified vessel or lesion type         Medical Decision Making:  Today's Assessment / Status / Plan:     Coronary artery disease status post PCI  Ischemic cardiomyopathy (EF 45%)  Dizziness    No recurrent anginal symptoms since last PCI in March. Euvolemic on exam. NYHA class I. Patient is tolerating the Brilinta well. He is not on aspirin since he is also on anticoagulation.  He is tolerating the bisoprolol better than the metoprolol. However he is having frequent dizziness. I have advised the patient to start taking bisoprolol 2.5 mg twice a day instead of 5 mg once a day.  I have also advised him lifestyle changes like elevating his legs  when sitting, standing up slowly and exercising his legs before standing up. He can also try using compression stockings. Should the patient have ongoing symptoms despite the above, he should let us know.    Paroxysmal atrial fibrillation   Continues to be in sinus rhythm today. Change of beta blocker as noted above. Patient's CHADS-Vasc score is at least 6 (age, DM2, CAD, HF, CVA). Continue Coumadin. No GI/ bleeding.    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 I have also explained to him that vitamin K does not act in a fast enough manner to really stop any major bleeding should he start having one. Also Brilinta is not reversible. I have strongly advised the patient to avoid any activities that put him at risk for bleeding especially in the one year following his stent. Even after the one year is completed, he will still be at significant risk for bleeding due to his Coumadin. Patient will try to avoid any such activities.    Return to clinic in 2 months or earlier if needed.    Thank you for allowing me to participate in the care of this patient. Please do not hesitate to contact me with any questions.    Liliana Song MD  Cardiologist  [facility]    PLEASE NOTE: This dictation was created using voice recognition software.

## 2017-11-29 NOTE — LETTER
[facility]   [address], [address]  [place] [address]  Phone: [phone]  Fax: [phone]              Francesco Schulte  1950    Encounter Date: 11/29/2017    Liliana Song M.D.          PROGRESS NOTE:  Subjective:   Francesco Schulte is a 67 y.o. male with polycythemia vera, diabetes and Coronary artery disease who is presenting to clinic for follow-up. At his last visit, patient had reported a history of dyspnea with metoprolol. At that time he was switched from metoprolol to bisoprolol. Since being switched to bisoprolol, his shortness of breath has essentially resolved. However he now notes dizziness with standing up after resting for a long time.    Otherwise feels well overall. Denies any chest discomfort or dizziness.    His blood pressure at home is usually 110s to 120s systolic.    He has been compliant with his Coumadin and his Brilinta. Denies any bleeding issues.    He continues to go off roading. Wants to know if he can get something faster acting than vitamin K in case he starts bleeding.      His cardiac history is as follows -     2008 - records not available for review. It appears that patient presented with an acute MI to [facility] where he underwent PCI to his LAD. He may have underwent POBA to the diagonal as well at this time. I obtained this information from a note dictated by Dr. Roe on 4/11/2016.    February 2014 - presented with anterior STEMI. Cardiac catheterization showed patent left main. patent stents. Moderate obstructive disease in the first diagonal status post angioplasty. Severe obstructive disease in the proximal left circumflex status post PCI with Integrity 2.5 x 26 stent BMS. Patent RCA and mild nonobstructive disease in the ramus.  Severe LV dysfunction with global hypokinesis was noted at this time.    Later in 2014, his Plavix was held for a few days for neck injection. Unfortunately 4 days after  "stopping the Plavix, patient had an MI and required stenting.    March 2016 - again presented with an MI to [facility]. Balloon angioplasty was performed without any stenting. Brilinta was started at this time. I obtained this information from a note dictated by Dr. Roe on 4/11/2016.    December 2016 - patient presented with new diagnosis of atrial fibrillation and was started on Eliquis. He was continued on his Brilinta and the aspirin as well.  About 2 weeks later, patient presented with loss of vision on the left side. He was thought to have had a stroke and appears that he recovered spontaneously.     March 2017 - anterior STEMI. Cardiac catheterization showed subacute stent thrombosis and in-stent restenosis of the proximal LAD stent and diffuse disease of the mid LAD. Status post PCI to the proximal and mid LAD with a Xience Alpine 4.0x8 mm and  2.5x38 mm OSWALD respectively. No changes in medications at this time.  He reports being started on polycythemia vera treatment during this visit. Previously, per patient only has hematocrit was being monitored and treated.      Past Medical History:   Diagnosis Date   • Diabetes (CMS-HCC)    • Heart attack    • Ischemic cardiomyopathy    • Kidney stones    • Polycythemia vera(238.4)    • Stroke (CMS-HCC)      Past Surgical History:   Procedure Laterality Date   • CATH PLACEMENT      right arm   • LAMINOTOMY      discectomy    • OTHER CARDIAC SURGERY      stents x 3     History reviewed. No pertinent family history.     History   Smoking Status   • Former Smoker   Smokeless Tobacco   • Never Used     Allergies   Allergen Reactions   • Metformin Unspecified     \"Similar to a heart attack, I was hospitalized\"   • Simvastatin      Myalgias   • Statins [Hmg-Coa-R Inhibitors]      Muscle ache, joint pain     Outpatient Encounter Prescriptions as of 11/29/2017   Medication Sig Dispense Refill   • bisoprolol (ZEBETA) 5 MG Tab Take 0.5 Tabs by mouth every day. 15 Tab " "11   • ticagrelor (BRILINTA) 90 MG Tab tablet Take 1 Tab by mouth 2 Times a Day. 180 Tab 3   • warfarin (COUMADIN) 4 MG Tab Take 1-1.5 Tabs by mouth every day. 135 Tab 1   • Insulin Syringe-Needle U-100 (INSULIN SYRINGE .5CC/31GX5/16\") 31G X 5/16\" 0.5 ML Misc 1 Each by Does not apply route every day. 100 Each 5   • warfarin (COUMADIN) 5 MG Tab Take 1 Tab by mouth every day. 30 Tab 3   • hydroxyurea (HYDREA) 500 MG Cap Take 4 Caps by mouth every day. 30 Cap 0   • famotidine (PEPCID) 20 MG Tab Take 1 Tab by mouth as needed (after hydrea). 60 Tab 0   • diphenhydrAMINE (BENADRYL) 50 MG Cap Take 50 mg by mouth every 6 hours as needed.     • insulin glargine (LANTUS) 100 UNIT/ML Solution Inject 20 Units as instructed every evening.     • mupirocin calcium (BACTROBAN) 2 % Cream Apply twice a day and/ or after each washing to affected area. 1 Tube 0   • tamsulosin (FLOMAX) 0.4 MG capsule Take 0.4 mg by mouth 2 Times a Day.     • finasteride (PROSCAR) 5 MG Tab Take 5 mg by mouth every day.     • colesevelam (WELCHOL) 625 MG Tab Take 625 mg by mouth 2 times a day, with meals.     • glipiZIDE (GLUCOTROL) 10 MG Tab Take 10 mg by mouth every evening.     • hydrocodone-acetaminophen (NORCO) 5-325 MG Tab per tablet Take 1-2 Tabs by mouth every 6 hours as needed. 10 Tab 0     No facility-administered encounter medications on file as of 11/29/2017.      Review of Systems   Constitutional: Negative for malaise/fatigue.   HENT: Negative.    Respiratory: Negative for shortness of breath.    Cardiovascular: Negative for chest pain, palpitations, orthopnea, leg swelling and PND.   Gastrointestinal: Negative for abdominal pain.   Musculoskeletal: Negative for falls.   Skin: Negative.    Neurological: Positive for dizziness. Negative for loss of consciousness.   Endo/Heme/Allergies: Does not bruise/bleed easily.   Psychiatric/Behavioral: Negative for depression.   All other systems reviewed and are negative.       Objective:   /64   " "Pulse 98   Ht 1.85 m (6' 0.83\")   Wt 101.2 kg (223 lb)   SpO2 95%   BMI 29.56 kg/m²      Physical Exam   Constitutional: He is oriented to person, place, and time. No distress.   HENT:   Head: Normocephalic and atraumatic.   Eyes: Conjunctivae are normal.   Neck: Normal range of motion. Neck supple. No JVD present.   Cardiovascular: Normal rate, regular rhythm and normal heart sounds.  Exam reveals no gallop and no friction rub.    No murmur heard.  Pulmonary/Chest: Effort normal and breath sounds normal. No respiratory distress. He has no wheezes. He has no rales.   Abdominal: Soft. There is no tenderness.   Musculoskeletal: He exhibits no edema.   Neurological: He is alert and oriented to person, place, and time.   Skin: Skin is warm and dry. He is not diaphoretic.   Psychiatric: He has a normal mood and affect.   Nursing note and vitals reviewed.     Echocardiogram in July 2016 at [facility], report copied below. Read by   Summary  Technically difficult study.  Normal sized Left Ventricle with moderately reduced systolic function.  Left Ventricle Ejection Fraction 40 to 45%. No Left Ventricular  hypertrophy. Hypokinesis of the apical septum, inferoapical wall, apical  lateral wall and distal anteroapical wall consistent with prior MI in lad  distribution.  Mildly dilated Left Atrium.  Normal sized Right Atrium and normal sized Right Ventricle with normal  systolic function.  Normal Aortic valve without stenosis. No Aortic valve regurgitation.  Normal sized proximal Aorta.  Normal Mitral valve without stenosis. No Mitral valve regurgitation.  Tricuspid valve regurgitation. Right Ventricular Systolic Pressure can not  be accurately assessed.  No pericardial effusion. Normal sized and responsive Inferior Vena Cava  consistent with normal central venous pressure.  compared with echo 6/15, slightly worse EF with with similar distribution  of regional wall motion abnormality.    Echocardiogram in December " 2016 at [place], report copied below. Read by Dr. Epstein  Summary  Left ventricular systolic function is mildly reduced and the ejection  fraction is 50-55%.  Thinned out myocardium and akinetic segments of mid to apical anteroseptal  walls and anterior apex are indicative of prior myocardial infarction.  Mildly increased left ventricle cavity size.  Moderately dilated left atrium.  Compared with previous echocardiogram performed on 7/18/2016, LV function  has mildly improved.    Echocardiogram performed here in March 2017 was reviewed  CONCLUSIONS  Mild reduced left ventricular systolic function.  Left ventricular ejection fraction is visually estimated to be 45%.  Septal wall akinesis.  Right heart pressures are normal.  Aortic sclerosis without stenosis.  Mild aortic insufficiency.    EKG from August 2017 showed sinus bradycardia at 57 bpm, left axis deviation, Q waves in V1 through V3, incomplete left bundle branch block.    Labs from September 2017 was reviewed. Hemoglobin 1.07.    Echocardiogram performed in September 2017 was reviewed  CONCLUSIONS  Prior echo done on 03-. No significant change noted compared to   prior study.   Mildly reduced left ventricular systolic function.  Left ventricular ejection fraction is visually estimated to be 45% with   wall motion abnormalities as noted above.  Grade I diastolic dysfunction.  Normal inferior vena cava size and inspiratory collapse.    Assessment:     1. Paroxysmal atrial fibrillation (CMS-HCC)     2. Coronary artery disease involving native heart without angina pectoris, unspecified vessel or lesion type         Medical Decision Making:  Today's Assessment / Status / Plan:     Coronary artery disease status post PCI  Ischemic cardiomyopathy (EF 45%)  Dizziness    No recurrent anginal symptoms since last PCI in March. Euvolemic on exam. NYHA class I. Patient is tolerating the Brilinta well. He is not on aspirin since he is also on anticoagulation.  He is  tolerating the bisoprolol better than the metoprolol. However he is having frequent dizziness. I have advised the patient to start taking bisoprolol 2.5 mg twice a day instead of 5 mg once a day.  I have also advised him lifestyle changes like elevating his legs when sitting, standing up slowly and exercising his legs before standing up. He can also try using compression stockings. Should the patient have ongoing symptoms despite the above, he should let us know.    Paroxysmal atrial fibrillation   Continues to be in sinus rhythm today. Change of beta blocker as noted above. Patient's CHADS-Vasc score is at least 6 (age, DM2, CAD, HF, CVA). Continue Coumadin. No GI/ bleeding.    I have again advised the patient about his risk for bleeding when he is off-roading. I have also explained to him that vitamin K does not act in a fast enough manner to really stop any major bleeding should he start having one. Also Brilinta is not reversible. I have strongly advised the patient to avoid any activities that put him at risk for bleeding especially in the one year following his stent. Even after the one year is completed, he will still be at significant risk for bleeding due to his Coumadin. Patient will try to avoid any such activities.    Return to clinic in 3 months or earlier if needed.    Thank you for allowing me to participate in the care of this patient. Please do not hesitate to contact me with any questions.    Liliana Song MD  Cardiologist  [facility]    PLEASE NOTE: This dictation was created using voice recognition software.         Nereyda Moore, ATUL.P.R.N.  [address]  [address]  [facility] [place] [address]  VIA In Basket

## 2017-12-14 ENCOUNTER — ANTICOAGULATION VISIT (OUTPATIENT)
Dept: MEDICAL GROUP | Facility: MEDICAL CENTER | Age: 67
End: 2017-12-14
Payer: MEDICARE

## 2017-12-14 DIAGNOSIS — Z79.01 LONG TERM CURRENT USE OF ANTICOAGULANT THERAPY: ICD-10-CM

## 2017-12-14 DIAGNOSIS — D68.9 COAGULOPATHY (HCC): ICD-10-CM

## 2017-12-14 LAB — INR PPP: 2.2 (ref 2–3.5)

## 2017-12-14 PROCEDURE — 99211 OFF/OP EST MAY X REQ PHY/QHP: CPT | Performed by: FAMILY MEDICINE

## 2017-12-14 PROCEDURE — 85610 PROTHROMBIN TIME: CPT | Performed by: FAMILY MEDICINE

## 2017-12-14 NOTE — PROGRESS NOTES
OP Anticoagulation Service Note    Date: 12/14/2017  There were no vitals filed for this visit.  Patient declined vitals    Anticoagulation Summary  As of 12/14/2017    INR goal:   2.0-3.0   TTR:   73.2 % (3.5 mo)   Today's INR:   2.2   Maintenance plan:   4 mg (4 mg x 1) every day   Weekly total:   28 mg   Plan last modified:   Tobias Encinas, PharmD (11/13/2017)   Next INR check:   1/11/2018   Target end date:   Indefinite    Indications    Coagulopathy (CMS-HCC) [D68.9]  Long term (current) use of anticoagulants [Z79.01] [Z79.01]  A-fib (CMS-HCC) (Resolved) [I48.91]             Anticoagulation Episode Summary     INR check location:   [facility]    Preferred lab:       Send INR reminders to:       Comments:           Anticoagulation Care Providers     Provider Role Specialty Phone number    [facility] Responsible  [phone]        Anticoagulation Patient Findings      HPI:   Francesco Schulte seen in clinic today, on anticoagulation therapy with warfarin (a high risk medication) for atrial fibrillation, CHADS-VASC = 6. Per pt his range is closer to 3.0 due to his history      Pt is here today to evaluate anticoagulation therapy    Previous INR was  3.4 on 11-13-17, at that time pt was advised to decrease dose to 4 mg daily    He has been taking a lower dose of warfarin than he was directed, 2 mg on Wed then 4 mg ROW  Diet has been consistent with foods rich in vitamin K: Yes  Changes in ETOH:  No  Changes in smoking status: No  Changes in medication: No   Cost restriction No  S/s of bleeding:  Yes, He has been having headaches on/off, describes them a a 6 all the time. Discussed with pt this could be a sign of a brain bleed but he states he gets these when he is dehydrated. Advised pt to seek medical attention if it does not go away or if he experiences any other symptoms. He is declining seeking medical attention at this time.   Signs/symptoms  thrombosis since the last  appt: No    A/P   INR  therapeutic today, however pt would prefer for INR to be closer to 3.0 d/t his history. He will require dose adjust ment today to prevent  stroke and closer follow up.   Begin taking 4 mg daily     Pt educated to contact our clinic with any changes in medications or s/s of bleeding or thrombosis    Follow up appointment in 3 week(s) to reduce risk of adverse events from warfarin   Kayleen Rocha, PharmD

## 2017-12-18 ENCOUNTER — TELEPHONE (OUTPATIENT)
Dept: CARDIOLOGY | Facility: MEDICAL CENTER | Age: 67
End: 2017-12-18

## 2017-12-18 DIAGNOSIS — I48.0 PAROXYSMAL ATRIAL FIBRILLATION (HCC): ICD-10-CM

## 2017-12-18 RX ORDER — BISOPROLOL FUMARATE 5 MG/1
2.5 TABLET, FILM COATED ORAL 2 TIMES DAILY
Qty: 90 TAB | Refills: 3 | OUTPATIENT
Start: 2017-12-18 | End: 2018-01-25

## 2017-12-18 NOTE — TELEPHONE ENCOUNTER
"L/m for pt to call back to discuss.     1100: S/w pt. States that at last OV, pt was was to increase Bisoprolol to 2.5mg BID per Dr. Song.  Per [facility] OV note 11/29, \"I have advised the patient to start taking bisoprolol 2.5 mg twice a day instead of 5 mg once a day.\"  Pt reports prescription is still for Bisoprolol 2.5 mg daily. He is requesting new script to be sent into pharmacy for 90 day supply. Educated him prescription will be called in today. Pt appreciative of assistance and denies further needs.     Prescription for Bisoprolol 2.5mg BID #90 with 3 refills called into [facility] pharmacy.     ----- Message from Silvestre Salcedo sent at 12/18/2017 10:11 AM PST -----  Regarding: Clarify dosage on bisoprolol  Contact: [phone]  [facility]/Manju    Pt is calling to clarify dosage for bisoprolol 5 mg as he states pharmacy won't refill medication. He can be reached at [phone].    "

## 2017-12-28 ENCOUNTER — TELEPHONE (OUTPATIENT)
Dept: VASCULAR LAB | Facility: MEDICAL CENTER | Age: 67
End: 2017-12-28

## 2018-01-04 ENCOUNTER — ANTICOAGULATION VISIT (OUTPATIENT)
Dept: MEDICAL GROUP | Facility: MEDICAL CENTER | Age: 68
End: 2018-01-04
Payer: MEDICARE

## 2018-01-04 VITALS — SYSTOLIC BLOOD PRESSURE: 93 MMHG | DIASTOLIC BLOOD PRESSURE: 72 MMHG | HEART RATE: 94 BPM

## 2018-01-04 DIAGNOSIS — Z79.01 LONG TERM CURRENT USE OF ANTICOAGULANT THERAPY: ICD-10-CM

## 2018-01-04 DIAGNOSIS — D68.9 COAGULOPATHY (HCC): ICD-10-CM

## 2018-01-04 LAB — INR PPP: 1.7 (ref 2–3.5)

## 2018-01-04 PROCEDURE — 85610 PROTHROMBIN TIME: CPT | Performed by: INTERNAL MEDICINE

## 2018-01-04 PROCEDURE — 99211 OFF/OP EST MAY X REQ PHY/QHP: CPT | Performed by: INTERNAL MEDICINE

## 2018-01-04 NOTE — PROGRESS NOTES
OP Anticoagulation Service Note    Date: 1/4/2018  Vitals:    01/04/18 0758 01/04/18 0800   BP: 125/80 (!) 93/72   Pulse: 95 94       Anticoagulation Summary  As of 1/4/2018    INR goal:   2.0-3.0   TTR:   67.7 % (4.2 mo)   Today's INR:   1.7!   Maintenance plan:   No maintenance plan   Plan last modified:   Kayleen Rocha, PharmD (1/4/2018)   Next INR check:   1/18/2018   Target end date:   Indefinite    Indications    Coagulopathy (CMS-HCC) [D68.9]  Long term (current) use of anticoagulants [Z79.01] [Z79.01]  A-fib (CMS-HCC) (Resolved) [I48.91]             Anticoagulation Episode Summary     INR check location:   [facility]    Preferred lab:       Send INR reminders to:       Comments:     per pt his INR is to be closer to 3.0 d/t his hx      Anticoagulation Care Providers     Provider Role Specialty Phone number    [facility] Responsible  [phone]    Liliana Song M.D.  Cardiology [phone]        Anticoagulation Patient Findings      HPI:   Francesco [place] Schulte seen in clinic today, on anticoagulation therapy with warfarin (a high risk medication) for a fib, CHADS-VASC = 6      Pt is here today to evaluate anticoagulation therapy    Previous INR was 2.2 on 12-14-17, pt was instructed to increase weekly regimen to get INR closer to 3.0 per his preference    He has been taking reduced dose of warfarin 2 mg alternating with 4 mg d/t headaches  Diet has been consistent with foods rich in vitamin K: Yes  Changes in ETOH:  No  Changes in smoking status: No  Changes in medication: No   Cost restriction: No  S/s of bleeding:  No  Signs/symptoms  thrombosis since the last appt: No  He report frequent severe headaches since switching from Eliquis to wafarin. This can be a rare side effect of warfarin. Low suspicion for brain bleed as headaches come and go. At last visit discussed seeking medical attention for possibility of brain bleed but he declined and he declined today. He would  like to switch back to Eliquis as he previosly tolerated this medication without headaches. He will stop warfarin and start Eliquis today. Instructed pt to seek medical attention for continued or worsening headaches on Eliquis.     A/P   INR  sub-therapeutic today, pt will stop warfarin and start Eliquis, he has 30 days supply at home already    Pt educated to contact our clinic with any changes in medications or s/s of bleeding or thrombosis    Follow up appointment in 2 week(s) to reduce risk of adverse events from anticoagulation  Kayleen Rocha, Pharm D

## 2018-01-09 ENCOUNTER — OFFICE VISIT (OUTPATIENT)
Dept: MEDICAL GROUP | Facility: MEDICAL CENTER | Age: 68
End: 2018-01-09
Payer: MEDICARE

## 2018-01-09 VITALS
BODY MASS INDEX: 30.19 KG/M2 | WEIGHT: 227.8 LBS | HEIGHT: 73 IN | SYSTOLIC BLOOD PRESSURE: 132 MMHG | DIASTOLIC BLOOD PRESSURE: 90 MMHG | TEMPERATURE: 97.6 F | HEART RATE: 91 BPM | OXYGEN SATURATION: 96 %

## 2018-01-09 DIAGNOSIS — I48.0 PAROXYSMAL ATRIAL FIBRILLATION (HCC): ICD-10-CM

## 2018-01-09 DIAGNOSIS — E66.9 OBESITY (BMI 30-39.9): ICD-10-CM

## 2018-01-09 DIAGNOSIS — D75.1 POLYCYTHEMIA: ICD-10-CM

## 2018-01-09 DIAGNOSIS — R51.9 CHRONIC DAILY HEADACHE: ICD-10-CM

## 2018-01-09 PROCEDURE — 99214 OFFICE O/P EST MOD 30 MIN: CPT | Performed by: NURSE PRACTITIONER

## 2018-01-09 NOTE — PROGRESS NOTES
"Subjective:     Chief Complaint   Patient presents with   • Dehydration   • Head Ache     Francesco Schulte is a 67 y.o. male here today to follow up on:    Paroxysmal atrial fibrillation (CMS-HCC)  Chronic issue  Switched from metoprolol to bisoprolol, also felt this was contributing to HA, dizziness. States that his blood pressure was fluctuating lightly at home, having episodes of hypotension. Discontinued bisooprolol recently  No palpitation, rapid heart beat, chest pain, dizziness, sob  Some improvement in HA last few days    Chronic daily headache  Since starting warfarin in Aug or so  R temporal region  Stopped warfarin 4 days ago, switched back to eliquis and feels HA has lessened the last few days  No n/v, light sensitivity    IDDM (insulin dependent diabetes mellitus) (CMS-[facility])  Last a1c 7.5  Fasting glucose around 135 recently  Notes excessive urination, feels dehydrated all the time despite adequate water consumption  Compliant with medications  No numbess or tingling in ext, no vision change    Polycythemia  Followed by Dr. Sims, on hydroxyurea, phlebotomy every 3-4 weeks. Blood counts have been stable       Current medicines (including changes today)  Current Outpatient Prescriptions   Medication Sig Dispense Refill   • apixaban (ELIQUIS) 5mg Tab Take 5 mg by mouth 2 Times a Day.     • ticagrelor (BRILINTA) 90 MG Tab tablet Take 1 Tab by mouth 2 Times a Day. 180 Tab 3   • Insulin Syringe-Needle U-100 (INSULIN SYRINGE .5CC/31GX5/16\") 31G X 5/16\" 0.5 ML Misc 1 Each by Does not apply route every day. 100 Each 5   • hydroxyurea (HYDREA) 500 MG Cap Take 4 Caps by mouth every day. 30 Cap 0   • famotidine (PEPCID) 20 MG Tab Take 1 Tab by mouth as needed (after hydrea). 60 Tab 0   • insulin glargine (LANTUS) 100 UNIT/ML Solution Inject 20 Units as instructed every evening.     • tamsulosin (FLOMAX) 0.4 MG capsule Take 0.4 mg by mouth 2 Times a Day.     • finasteride (PROSCAR) 5 MG Tab Take 5 mg by mouth " "every day.     • colesevelam (WELCHOL) 625 MG Tab Take 625 mg by mouth 2 times a day, with meals.     • glipiZIDE (GLUCOTROL) 10 MG Tab Take 10 mg by mouth every evening.     • bisoprolol (ZEBETA) 5 MG Tab Take 0.5 Tabs by mouth 2 times a day. 90 Tab 3   • hydrocodone-acetaminophen (NORCO) 5-325 MG Tab per tablet Take 1-2 Tabs by mouth every 6 hours as needed. 10 Tab 0   • diphenhydrAMINE (BENADRYL) 50 MG Cap Take 50 mg by mouth every 6 hours as needed.       No current facility-administered medications for this visit.      He  has a past medical history of Diabetes (CMS-HCC); Heart attack; Ischemic cardiomyopathy; Kidney stones; Polycythemia vera(238.4); and Stroke (CMS-HCC).    ROS included above     Objective:     Blood pressure 132/90, pulse 91, temperature 36.4 °C (97.6 °F), height 1.85 m (6' 0.83\"), weight 103.3 kg (227 lb 12.8 oz), SpO2 96 %. Body mass index is 30.2 kg/m².     Physical Exam:  General: Alert, oriented in no acute distress.  Eye contact is good, speech is normal, affect calm  HEENT: Oral mucosa pink moist, no lesions. TMs gray with good landmarks bilaterally. No lymphadenopathy.  Lungs: clear to auscultation bilaterally, normal effort, no wheeze/ rhonchi/ rales.  CV: regular rate and rhythm, S1, S2, no murmur  Abdomen: soft, nontender  Ext: no edema, color normal, vascularity normal, temperature normal    Assessment and Plan:   The following treatment plan was discussed   1. Paroxysmal atrial fibrillation (CMS-[facility])  Perceived side effects with bisoprolol, reports widely fluctuating blood pressures at home. Off medication the last few days, denies any symptoms including palpitation, rapid heartbeat, shortness of breath. Advised follow-up with cardiology    2. Chronic daily headache  Daily headache for the last several months which he attributes to warfarin. He was recently switched back to Eliquis and has noted some decrease in headache over the last few days.    3. IDDM (insulin dependent " "diabetes mellitus) (CMS-[facility])  Last A1c 7.5, compliant with diet and medications. He does note some increasing urination and feels that he is constantly \"dehydrated\" Recheck labs  COMP METABOLIC PANEL    HEMOGLOBIN A1C    LIPID PROFILE   4. Polycythemia  Followed by Dr. Sims, stable with phlebotomy and medication    5. Obesity (BMI 30-39.9)  Patient identified as having weight management issue.  Appropriate orders and counseling given.       Followup: Pending labs         Please note that this dictation was created using voice recognition software. I have worked with consultants from the vendor as well as technical experts from [facility] to optimize the interface. I have made every reasonable attempt to correct obvious errors, but I expect that there are errors of grammar and possibly content that I did not discover before finalizing the note.       "

## 2018-01-09 NOTE — ASSESSMENT & PLAN NOTE
Last a1c 7.5  Fasting glucose around 135 recently  Notes excessive urination, feels dehydrated all the time despite adequate water consumption  Compliant with medications  No numbess or tingling in ext, no vision change

## 2018-01-09 NOTE — ASSESSMENT & PLAN NOTE
Chronic issue  Switched from metoprolol to bisoprolol, also felt this was contributing to HA, dizziness. Discontinued bisooprolol recently  No palpitation, rapid heart beat, chest pain, dizziness, sob  Some improvement in HA last few days

## 2018-01-09 NOTE — ASSESSMENT & PLAN NOTE
Since starting warfarin in Aug or so  R temporal region  Stopped warfarin 4 days ago, switched back to eliquis and feels HA has lessened the last few days  No n/v, light sensitivity

## 2018-01-10 ENCOUNTER — HOSPITAL ENCOUNTER (OUTPATIENT)
Dept: LAB | Facility: MEDICAL CENTER | Age: 68
End: 2018-01-10
Attending: NURSE PRACTITIONER
Payer: MEDICARE

## 2018-01-10 LAB
ALBUMIN SERPL BCP-MCNC: 4.1 G/DL (ref 3.2–4.9)
ALBUMIN/GLOB SERPL: 1.2 G/DL
ALP SERPL-CCNC: 39 U/L (ref 30–99)
ALT SERPL-CCNC: 15 U/L (ref 2–50)
ANION GAP SERPL CALC-SCNC: 6 MMOL/L (ref 0–11.9)
AST SERPL-CCNC: 17 U/L (ref 12–45)
BILIRUB SERPL-MCNC: 0.9 MG/DL (ref 0.1–1.5)
BUN SERPL-MCNC: 14 MG/DL (ref 8–22)
CALCIUM SERPL-MCNC: 9.4 MG/DL (ref 8.5–10.5)
CHLORIDE SERPL-SCNC: 105 MMOL/L (ref 96–112)
CHOLEST SERPL-MCNC: 143 MG/DL (ref 100–199)
CO2 SERPL-SCNC: 25 MMOL/L (ref 20–33)
CREAT SERPL-MCNC: 0.93 MG/DL (ref 0.5–1.4)
GLOBULIN SER CALC-MCNC: 3.4 G/DL (ref 1.9–3.5)
GLUCOSE SERPL-MCNC: 141 MG/DL (ref 65–99)
HDLC SERPL-MCNC: 40 MG/DL
LDLC SERPL CALC-MCNC: 85 MG/DL
POTASSIUM SERPL-SCNC: 3.7 MMOL/L (ref 3.6–5.5)
PROT SERPL-MCNC: 7.5 G/DL (ref 6–8.2)
SODIUM SERPL-SCNC: 136 MMOL/L (ref 135–145)
TRIGL SERPL-MCNC: 88 MG/DL (ref 0–149)

## 2018-01-10 PROCEDURE — 83036 HEMOGLOBIN GLYCOSYLATED A1C: CPT | Mod: GA

## 2018-01-10 PROCEDURE — 80061 LIPID PANEL: CPT

## 2018-01-10 PROCEDURE — 36415 COLL VENOUS BLD VENIPUNCTURE: CPT

## 2018-01-10 PROCEDURE — 80053 COMPREHEN METABOLIC PANEL: CPT

## 2018-01-11 LAB
EST. AVERAGE GLUCOSE BLD GHB EST-MCNC: 160 MG/DL
HBA1C MFR BLD: 7.2 % (ref 0–5.6)

## 2018-01-18 ENCOUNTER — ANTICOAGULATION VISIT (OUTPATIENT)
Dept: MEDICAL GROUP | Facility: MEDICAL CENTER | Age: 68
End: 2018-01-18
Payer: MEDICARE

## 2018-01-18 VITALS — HEART RATE: 99 BPM | DIASTOLIC BLOOD PRESSURE: 89 MMHG | SYSTOLIC BLOOD PRESSURE: 126 MMHG

## 2018-01-18 DIAGNOSIS — D68.9 COAGULOPATHY (HCC): ICD-10-CM

## 2018-01-18 DIAGNOSIS — Z79.01 LONG TERM CURRENT USE OF ANTICOAGULANT THERAPY: ICD-10-CM

## 2018-01-18 LAB — INR PPP: 1.4 (ref 2–3.5)

## 2018-01-18 PROCEDURE — 99999 PR NO CHARGE: CPT | Performed by: FAMILY MEDICINE

## 2018-01-18 PROCEDURE — 85610 PROTHROMBIN TIME: CPT | Performed by: FAMILY MEDICINE

## 2018-01-18 NOTE — PROGRESS NOTES
OP Anticoagulation Service Note    Date: 1/18/2018    Anticoaguation   Pt is on anticoagulation for atrial fibrillation, duration of treatment indefinite     Health Status Since Last Assessment   Patient denies any new relevant medical problems, ED visits or hospitalizations   Patient denies any embolic events (stroke/tia/systemic embolism)    Adherence with DOAC Therapy   Pt has NOT missed any doses in the average week    Bleeding Risk Assessment     Denies Epistaxis   Pt denies any excessive or unusual bleeding/hematomas.  Pt denies any GI bleeds or hematemesis.  Pt denies any concerning daily headache or sub dural hematoma symptoms.     Pt denies any hematuria or abnormal vaginal bleeding.   Latest Hemoglobin 11.0   ETOH overuse Denies     Creatinine Clearance/Renal Function     Latest SCr = 0.9    Drug Interactions   ASA/other antiplatelets: Brillinta   NSAID : pt avoids   Other drug interactions  NONE    Examination   Blood Pressure WNL   Symptomatic hypotension yes, he gets up slowly and is aware to seek medical attention for falls   Significant gait impairment/imbalance/high risk for falls? 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 He would like to continue with Eliquis, he has not been doing as much high risk work as in the past. He is aware there is no reversal agent for Eliquis   Patient appears stable from the anticoagulation staindpoint.     Benefits of continued DOAC therapy ouyweigh risks for this patient   Recommend pt continue with current DOAC therapy     Medication:   Eliquis  5 mg twice daily for remainder of treatment      DO NOT STOP anticoagulation unless directed by provider or our clinic.     Next Appointment: Thursday, July 12th in clinic     For questions, please contact Outpatient Anticoagulation Service  [phone].     Kayleen Rocha, Pharm D

## 2018-01-24 ENCOUNTER — HOSPITAL ENCOUNTER (OUTPATIENT)
Dept: RADIOLOGY | Facility: MEDICAL CENTER | Age: 68
End: 2018-01-24
Attending: INTERNAL MEDICINE
Payer: MEDICARE

## 2018-01-24 DIAGNOSIS — D47.3 THROMBOCYTHEMIA, ESSENTIAL (HCC): ICD-10-CM

## 2018-01-24 DIAGNOSIS — I72.9 ANEURYSM (HCC): ICD-10-CM

## 2018-01-24 DIAGNOSIS — C47.3: ICD-10-CM

## 2018-01-24 DIAGNOSIS — D47.1 CHRONIC MYELOSIS (HCC): ICD-10-CM

## 2018-01-24 DIAGNOSIS — G44.209 TENSION-TYPE HEADACHE, NOT INTRACTABLE, UNSPECIFIED CHRONICITY PATTERN: ICD-10-CM

## 2018-01-24 DIAGNOSIS — R51.9 NONINTRACTABLE HEADACHE, UNSPECIFIED CHRONICITY PATTERN, UNSPECIFIED HEADACHE TYPE: ICD-10-CM

## 2018-01-24 PROCEDURE — 70544 MR ANGIOGRAPHY HEAD W/O DYE: CPT

## 2018-01-24 PROCEDURE — 700117 HCHG RX CONTRAST REV CODE 255: Performed by: INTERNAL MEDICINE

## 2018-01-24 PROCEDURE — A9579 GAD-BASE MR CONTRAST NOS,1ML: HCPCS | Performed by: INTERNAL MEDICINE

## 2018-01-24 PROCEDURE — 70553 MRI BRAIN STEM W/O & W/DYE: CPT

## 2018-01-24 RX ADMIN — GADODIAMIDE 20 ML: 287 INJECTION INTRAVENOUS at 16:16

## 2018-01-25 ENCOUNTER — OFFICE VISIT (OUTPATIENT)
Dept: CARDIOLOGY | Facility: MEDICAL CENTER | Age: 68
End: 2018-01-25
Payer: MEDICARE

## 2018-01-25 VITALS
HEART RATE: 88 BPM | BODY MASS INDEX: 29.55 KG/M2 | WEIGHT: 223 LBS | OXYGEN SATURATION: 97 % | HEIGHT: 73 IN | SYSTOLIC BLOOD PRESSURE: 122 MMHG | DIASTOLIC BLOOD PRESSURE: 70 MMHG

## 2018-01-25 DIAGNOSIS — I50.22 CHRONIC SYSTOLIC CHF (CONGESTIVE HEART FAILURE) (HCC): ICD-10-CM

## 2018-01-25 DIAGNOSIS — E78.5 DYSLIPIDEMIA: ICD-10-CM

## 2018-01-25 DIAGNOSIS — I25.10 CORONARY ARTERY DISEASE INVOLVING NATIVE HEART WITHOUT ANGINA PECTORIS, UNSPECIFIED VESSEL OR LESION TYPE: ICD-10-CM

## 2018-01-25 PROCEDURE — 99215 OFFICE O/P EST HI 40 MIN: CPT | Performed by: INTERNAL MEDICINE

## 2018-01-25 ASSESSMENT — ENCOUNTER SYMPTOMS
LOSS OF CONSCIOUSNESS: 0
PND: 0
DIZZINESS: 0
ABDOMINAL PAIN: 0
SHORTNESS OF BREATH: 0
BRUISES/BLEEDS EASILY: 0
ORTHOPNEA: 0
FALLS: 0
PALPITATIONS: 0
DEPRESSION: 0

## 2018-01-25 NOTE — LETTER
[facility]   [address], [address]  [place] [address]  Phone: [phone]  Fax: [phone]              Francesco Schulte  1950    Encounter Date: 1/25/2018    Liliana Song M.D.          PROGRESS NOTE:  Subjective:   Francesco Schulte is a 67 y.o. male with polycythemia vera, diabetes and Coronary artery disease who is presenting to clinic for follow-up on his coronary artery disease.    For his coronary artery disease, he denies any anginal symptoms. He has been compliant with his Brilinta.  He has cardiomyopathy. Denies any fluid overload symptoms like orthopnea or PND.    His main issue previously was headaches which appear to have resolved after he was switched from Coumadin to Eliquis.  He denies any bleeding issues from the anticoagulation.    He was started on bisoprolol for management of his cardiomyopathy and his atrial fibrillation. Unfortunately the patient had severe dizziness while on it. Despite cutting back to 2.5 mg twice daily he continued to have severe symptoms. He cut back to once daily dosing and continue to have symptoms. He has stopped taking this medicine and since then has been asymptomatic.     He has not had any recurrent episodes of atrial fibrillation. He is currently on no AV leslie blocking agents.    His blood pressure at home is usually 110s to 120s systolic. He does not add salt to his notes.      His cardiac history is as follows -     2008 - records not available for review. It appears that patient presented with an acute MI to  where he underwent PCI to his LAD. He may have underwent POBA to the diagonal as well at this time. I obtained this information from a note dictated by Dr. Roe on 4/11/2016.    February 2014 - presented with anterior STEMI. Cardiac catheterization showed patent left main. patent stents. Moderate obstructive disease in the first diagonal status post angioplasty. Severe  obstructive disease in the proximal left circumflex status post PCI with Integrity 2.5 x 26 stent BMS. Patent RCA and mild nonobstructive disease in the ramus.  Severe LV dysfunction with global hypokinesis was noted at this time.    Later in 2014, his Plavix was held for a few days for neck injection. Unfortunately 4 days after stopping the Plavix, patient had an MI and required stenting.    March 2016 - again presented with an MI to [facility]. Balloon angioplasty was performed without any stenting. Brilinta was started at this time. I obtained this information from a note dictated by Dr. Roe on 4/11/2016.    December 2016 - patient presented with new diagnosis of atrial fibrillation and was started on Eliquis. He was continued on his Brilinta and the aspirin as well.  About 2 weeks later, patient presented with loss of vision on the left side. He was thought to have had a stroke and appears that he recovered spontaneously.     March 2017 - anterior STEMI. Cardiac catheterization showed subacute stent thrombosis and in-stent restenosis of the proximal LAD stent and diffuse disease of the mid LAD. Status post PCI to the proximal and mid LAD with a Xience Alpine 4.0x8 mm and  2.5x38 mm OSWALD respectively. No changes in medications at this time.  He reports being started on polycythemia vera treatment during this visit. Previously, per patient only has hematocrit was being monitored and treated.      Past Medical History:   Diagnosis Date   • Diabetes (CMS-HCC)    • Heart attack    • Ischemic cardiomyopathy    • Kidney stones    • Polycythemia vera(238.4)    • Stroke (CMS-HCC)      Past Surgical History:   Procedure Laterality Date   • CATH PLACEMENT      right arm   • LAMINOTOMY      discectomy    • OTHER CARDIAC SURGERY      stents x 3     History reviewed. No pertinent family history.     History   Smoking Status   • Former Smoker   Smokeless Tobacco   • Never Used     Allergies   Allergen Reactions   •  "Metformin Unspecified     \"Similar to a heart attack, I was hospitalized\"   • Simvastatin      Myalgias   • Statins [Hmg-Coa-R Inhibitors]      Muscle ache, joint pain     Outpatient Encounter Prescriptions as of 1/25/2018   Medication Sig Dispense Refill   • apixaban (ELIQUIS) 5mg Tab Take 1 Tab by mouth 2 Times a Day. 60 Tab 5   • ticagrelor (BRILINTA) 90 MG Tab tablet Take 1 Tab by mouth 2 Times a Day. 180 Tab 3   • Insulin Syringe-Needle U-100 (INSULIN SYRINGE .5CC/31GX5/16\") 31G X 5/16\" 0.5 ML Misc 1 Each by Does not apply route every day. 100 Each 5   • hydrocodone-acetaminophen (NORCO) 5-325 MG Tab per tablet Take 1-2 Tabs by mouth every 6 hours as needed. 10 Tab 0   • hydroxyurea (HYDREA) 500 MG Cap Take 4 Caps by mouth every day. 30 Cap 0   • famotidine (PEPCID) 20 MG Tab Take 1 Tab by mouth as needed (after hydrea). 60 Tab 0   • insulin glargine (LANTUS) 100 UNIT/ML Solution Inject 20 Units as instructed every evening.     • tamsulosin (FLOMAX) 0.4 MG capsule Take 0.4 mg by mouth every day.     • finasteride (PROSCAR) 5 MG Tab Take 5 mg by mouth every day.     • colesevelam (WELCHOL) 625 MG Tab Take 625 mg by mouth 2 times a day, with meals.     • glipiZIDE (GLUCOTROL) 10 MG Tab Take 10 mg by mouth every evening.     • [DISCONTINUED] bisoprolol (ZEBETA) 5 MG Tab Take 0.5 Tabs by mouth 2 times a day. 90 Tab 3   • [DISCONTINUED] diphenhydrAMINE (BENADRYL) 50 MG Cap Take 50 mg by mouth every 6 hours as needed.       No facility-administered encounter medications on file as of 1/25/2018.      Review of Systems   Constitutional: Negative for malaise/fatigue.   Respiratory: Negative for shortness of breath.    Cardiovascular: Negative for chest pain, palpitations, orthopnea, leg swelling and PND.   Gastrointestinal: Negative for abdominal pain.   Musculoskeletal: Negative for falls.   Skin: Negative.    Neurological: Negative for dizziness and loss of consciousness.   Endo/Heme/Allergies: Does not bruise/bleed " "easily.   Psychiatric/Behavioral: Negative for depression.   All other systems reviewed and are negative.       Objective:   /70   Pulse 88   Ht 1.85 m (6' 0.83\")   Wt 101.2 kg (223 lb)   SpO2 97%   BMI 29.56 kg/m²      Physical Exam   Constitutional: He is oriented to person, place, and time. No distress.   HENT:   Head: Normocephalic and atraumatic.   Eyes: Conjunctivae are normal.   Neck: Normal range of motion. Neck supple. No JVD present.   Cardiovascular: Normal rate, regular rhythm and normal heart sounds.  Exam reveals no gallop and no friction rub.    No murmur heard.  Pulmonary/Chest: Effort normal and breath sounds normal. No respiratory distress. He has no wheezes. He has no rales.   Abdominal: Soft. There is no tenderness.   Musculoskeletal: He exhibits no edema.   Neurological: He is alert and oriented to person, place, and time.   Skin: Skin is warm and dry. He is not diaphoretic.   Psychiatric: He has a normal mood and affect.   Nursing note and vitals reviewed.     Prior cardiac testing:  Echocardiogram performed in September 2017 showed a mildly reduced LV function with an EF of 45%. No significant changes from prior study.   ------------------  Last performed in January 2018 was reviewed and showed creatinine 0.93. LDL 85.    Assessment:     1. Chronic systolic CHF (congestive heart failure) (CMS-HCC)     2. Coronary artery disease involving native heart without angina pectoris, unspecified vessel or lesion type     3. Dyslipidemia         Medical Decision Making:  Today's Assessment / Status / Plan:     Coronary artery disease status post PCI  Ischemic cardiomyopathy (EF 45%)    Patient has been free of anginal symptoms. Euvolemic on exam. NYHA class I.  Continue Brilinta. Not on aspirin as he is on anticoagulation.  He has been tried on various different beta blockers and continues to have severe symptoms as discussed above. For now continue to hold off on beta blockers.  Does not " have adequate blood pressure room to consider starting an ACE inhibitor or an ARB at this time.  No indication for diuretics at this time.    Paroxysmal atrial fibrillation: Continues to maintain sinus rhythm. Currently not on any AV leslie blocking agents as above.  Had headaches with the Coumadin. Currently on Eliquis. Denies any bleeding issues.     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  Especially now that he is on Eliquis and Brilinta which are both not reversible. His risk for bleeding is significantly elevated. He should be very careful with his extracurricular activities.    Return to clinic in 3 months or earlier if needed.    Thank you for allowing me to participate in the care of this patient. Please do not hesitate to contact me with any questions.    Liliana Song MD  Cardiologist  [facility]    PLEASE NOTE: This dictation was created using voice recognition software.         Nereyda Moore, ATUL.P.R.N.  [address]  [address]  [place] [address]  VIA In Basket

## 2018-01-25 NOTE — PROGRESS NOTES
Subjective:   Francesco Schulte is a 67 y.o. male with polycythemia vera, diabetes and Coronary artery disease who is presenting to clinic for follow-up on his coronary artery disease.    For his coronary artery disease, he denies any anginal symptoms. He has been compliant with his Brilinta.  He has cardiomyopathy. Denies any fluid overload symptoms like orthopnea or PND.    His main issue previously was headaches which appear to have resolved after he was switched from Coumadin to Eliquis.  He denies any bleeding issues from the anticoagulation.    He was started on bisoprolol for management of his cardiomyopathy and his atrial fibrillation. Unfortunately the patient had severe dizziness while on it. Despite cutting back to 2.5 mg twice daily he continued to have severe symptoms. He cut back to once daily dosing and continue to have symptoms. He has stopped taking this medicine and since then has been asymptomatic.     He has not had any recurrent episodes of atrial fibrillation. He is currently on no AV leslie blocking agents.    His blood pressure at home is usually 110s to 120s systolic. He does not add salt to his notes.      His cardiac history is as follows -     2008 - records not available for review. It appears that patient presented with an acute MI to [facility] where he underwent PCI to his LAD. He may have underwent POBA to the diagonal as well at this time. I obtained this information from a note dictated by Dr. Roe on 4/11/2016.    February 2014 - presented with anterior STEMI. Cardiac catheterization showed patent left main. patent stents. Moderate obstructive disease in the first diagonal status post angioplasty. Severe obstructive disease in the proximal left circumflex status post PCI with Integrity 2.5 x 26 stent BMS. Patent RCA and mild nonobstructive disease in the ramus.  Severe LV dysfunction with global hypokinesis was noted at this time.    Later in 2014, his Plavix was held for  "a few days for neck injection. Unfortunately 4 days after stopping the Plavix, patient had an MI and required stenting.    March 2016 - again presented with an MI to [facility]. Balloon angioplasty was performed without any stenting. Brilinta was started at this time. I obtained this information from a note dictated by Dr. Roe on 4/11/2016.    December 2016 - patient presented with new diagnosis of atrial fibrillation and was started on Eliquis. He was continued on his Brilinta and the aspirin as well.  About 2 weeks later, patient presented with loss of vision on the left side. He was thought to have had a stroke and appears that he recovered spontaneously.     March 2017 - anterior STEMI. Cardiac catheterization showed subacute stent thrombosis and in-stent restenosis of the proximal LAD stent and diffuse disease of the mid LAD. Status post PCI to the proximal and mid LAD with a Xience Alpine 4.0x8 mm and  2.5x38 mm OSWALD respectively. No changes in medications at this time.  He reports being started on polycythemia vera treatment during this visit. Previously, per patient only has hematocrit was being monitored and treated.      Past Medical History:   Diagnosis Date   • Diabetes (CMS-[facility])    • Heart attack    • Ischemic cardiomyopathy    • Kidney stones    • Polycythemia vera(238.4)    • Stroke (CMS-[facility])      Past Surgical History:   Procedure Laterality Date   • CATH PLACEMENT      right arm   • LAMINOTOMY      discectomy    • OTHER CARDIAC SURGERY      stents x 3     History reviewed. No pertinent family history.     History   Smoking Status   • Former Smoker   Smokeless Tobacco   • Never Used     Allergies   Allergen Reactions   • Metformin Unspecified     \"Similar to a heart attack, I was hospitalized\"   • Simvastatin      Myalgias   • Statins [Hmg-Coa-R Inhibitors]      Muscle ache, joint pain     Outpatient Encounter Prescriptions as of 1/25/2018   Medication Sig Dispense Refill   • apixaban " "(ELIQUIS) 5mg Tab Take 1 Tab by mouth 2 Times a Day. 60 Tab 5   • ticagrelor (BRILINTA) 90 MG Tab tablet Take 1 Tab by mouth 2 Times a Day. 180 Tab 3   • Insulin Syringe-Needle U-100 (INSULIN SYRINGE .5CC/31GX5/16\") 31G X 5/16\" 0.5 ML Misc 1 Each by Does not apply route every day. 100 Each 5   • hydrocodone-acetaminophen (NORCO) 5-325 MG Tab per tablet Take 1-2 Tabs by mouth every 6 hours as needed. 10 Tab 0   • hydroxyurea (HYDREA) 500 MG Cap Take 4 Caps by mouth every day. 30 Cap 0   • famotidine (PEPCID) 20 MG Tab Take 1 Tab by mouth as needed (after hydrea). 60 Tab 0   • insulin glargine (LANTUS) 100 UNIT/ML Solution Inject 20 Units as instructed every evening.     • tamsulosin (FLOMAX) 0.4 MG capsule Take 0.4 mg by mouth every day.     • finasteride (PROSCAR) 5 MG Tab Take 5 mg by mouth every day.     • colesevelam (WELCHOL) 625 MG Tab Take 625 mg by mouth 2 times a day, with meals.     • glipiZIDE (GLUCOTROL) 10 MG Tab Take 10 mg by mouth every evening.     • [DISCONTINUED] bisoprolol (ZEBETA) 5 MG Tab Take 0.5 Tabs by mouth 2 times a day. 90 Tab 3   • [DISCONTINUED] diphenhydrAMINE (BENADRYL) 50 MG Cap Take 50 mg by mouth every 6 hours as needed.       No facility-administered encounter medications on file as of 1/25/2018.      Review of Systems   Constitutional: Negative for malaise/fatigue.   Respiratory: Negative for shortness of breath.    Cardiovascular: Negative for chest pain, palpitations, orthopnea, leg swelling and PND.   Gastrointestinal: Negative for abdominal pain.   Musculoskeletal: Negative for falls.   Skin: Negative.    Neurological: Negative for dizziness and loss of consciousness.   Endo/Heme/Allergies: Does not bruise/bleed easily.   Psychiatric/Behavioral: Negative for depression.   All other systems reviewed and are negative.       Objective:   /70   Pulse 88   Ht 1.85 m (6' 0.83\")   Wt 101.2 kg (223 lb)   SpO2 97%   BMI 29.56 kg/m²     Physical Exam   Constitutional: He is " oriented to person, place, and time. No distress.   HENT:   Head: Normocephalic and atraumatic.   Eyes: Conjunctivae are normal.   Neck: Normal range of motion. Neck supple. No JVD present.   Cardiovascular: Normal rate, regular rhythm and normal heart sounds.  Exam reveals no gallop and no friction rub.    No murmur heard.  Pulmonary/Chest: Effort normal and breath sounds normal. No respiratory distress. He has no wheezes. He has no rales.   Abdominal: Soft. There is no tenderness.   Musculoskeletal: He exhibits no edema.   Neurological: He is alert and oriented to person, place, and time.   Skin: Skin is warm and dry. He is not diaphoretic.   Psychiatric: He has a normal mood and affect.   Nursing note and vitals reviewed.     Prior cardiac testing:  Echocardiogram performed in September 2017 showed a mildly reduced LV function with an EF of 45%. No significant changes from prior study.   ------------------  Last performed in January 2018 was reviewed and showed creatinine 0.93. LDL 85.    Assessment:     1. Chronic systolic CHF (congestive heart failure) (CMS-HCC)     2. Coronary artery disease involving native heart without angina pectoris, unspecified vessel or lesion type     3. Dyslipidemia         Medical Decision Making:  Today's Assessment / Status / Plan:     Coronary artery disease status post PCI  Ischemic cardiomyopathy (EF 45%)    Patient has been free of anginal symptoms. Euvolemic on exam. NYHA class I.  Continue Brilinta. Not on aspirin as he is on anticoagulation.  He has been tried on various different beta blockers and continues to have severe symptoms as discussed above. For now continue to hold off on beta blockers.  Does not have adequate blood pressure room to consider starting an ACE inhibitor or an ARB at this time.  No indication for diuretics at this time.    Paroxysmal atrial fibrillation: Continues to maintain sinus rhythm. Currently not on any AV leslie blocking agents as above.  Had  headaches with the Coumadin. Currently on Eliquis. Denies any bleeding issues.     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  Especially now that he is on Eliquis and Brilinta which are both not reversible. His risk for bleeding is significantly elevated. He should be very careful with his extracurricular activities.    Return to clinic in 3 months or earlier if needed.    Thank you for allowing me to participate in the care of this patient. Please do not hesitate to contact me with any questions.    Liliana Song MD  Cardiologist  [facility]    PLEASE NOTE: This dictation was created using voice recognition software.

## 2018-02-26 DIAGNOSIS — I48.0 PAF (PAROXYSMAL ATRIAL FIBRILLATION) (HCC): ICD-10-CM

## 2018-03-09 ENCOUNTER — PATIENT MESSAGE (OUTPATIENT)
Dept: HEALTH INFORMATION MANAGEMENT | Facility: OTHER | Age: 68
End: 2018-03-09

## 2018-03-10 ENCOUNTER — HOSPITAL ENCOUNTER (OUTPATIENT)
Facility: MEDICAL CENTER | Age: 68
End: 2018-03-11
Attending: EMERGENCY MEDICINE | Admitting: FAMILY MEDICINE
Payer: MEDICARE

## 2018-03-10 ENCOUNTER — APPOINTMENT (OUTPATIENT)
Dept: RADIOLOGY | Facility: MEDICAL CENTER | Age: 68
End: 2018-03-10
Attending: EMERGENCY MEDICINE
Payer: MEDICARE

## 2018-03-10 ENCOUNTER — RESOLUTE PROFESSIONAL BILLING HOSPITAL PROF FEE (OUTPATIENT)
Dept: HOSPITALIST | Facility: MEDICAL CENTER | Age: 68
End: 2018-03-10
Payer: MEDICARE

## 2018-03-10 DIAGNOSIS — R42 VERTIGO: ICD-10-CM

## 2018-03-10 DIAGNOSIS — R42 DIZZINESS: ICD-10-CM

## 2018-03-10 DIAGNOSIS — R11.2 INTRACTABLE VOMITING WITH NAUSEA, UNSPECIFIED VOMITING TYPE: ICD-10-CM

## 2018-03-10 PROBLEM — I77.9 CAROTID ARTERY DISEASE (HCC): Status: ACTIVE | Noted: 2018-03-10

## 2018-03-10 PROBLEM — D69.6 THROMBOCYTOPENIA (HCC): Status: ACTIVE | Noted: 2018-03-10

## 2018-03-10 PROBLEM — E87.6 HYPOKALEMIA: Status: ACTIVE | Noted: 2018-03-10

## 2018-03-10 PROBLEM — Z86.73 HISTORY OF STROKE: Status: ACTIVE | Noted: 2018-03-10

## 2018-03-10 PROBLEM — D72.819 LEUKOPENIA: Status: ACTIVE | Noted: 2018-03-10

## 2018-03-10 LAB
ALBUMIN SERPL BCP-MCNC: 4.6 G/DL (ref 3.2–4.9)
ALBUMIN/GLOB SERPL: 1.2 G/DL
ALP SERPL-CCNC: 53 U/L (ref 30–99)
ALT SERPL-CCNC: 19 U/L (ref 2–50)
ANION GAP SERPL CALC-SCNC: 11 MMOL/L (ref 0–11.9)
ANISOCYTOSIS BLD QL SMEAR: ABNORMAL
APTT PPP: 31.1 SEC (ref 24.7–36)
AST SERPL-CCNC: 21 U/L (ref 12–45)
BASOPHILS # BLD AUTO: 0.8 % (ref 0–1.8)
BASOPHILS # BLD: 0.03 K/UL (ref 0–0.12)
BILIRUB SERPL-MCNC: 1 MG/DL (ref 0.1–1.5)
BUN SERPL-MCNC: 16 MG/DL (ref 8–22)
CALCIUM SERPL-MCNC: 9.5 MG/DL (ref 8.4–10.2)
CHLORIDE SERPL-SCNC: 106 MMOL/L (ref 96–112)
CO2 SERPL-SCNC: 19 MMOL/L (ref 20–33)
COMMENT 1642: NORMAL
CREAT SERPL-MCNC: 1.08 MG/DL (ref 0.5–1.4)
EOSINOPHIL # BLD AUTO: 0.05 K/UL (ref 0–0.51)
EOSINOPHIL NFR BLD: 1.4 % (ref 0–6.9)
ERYTHROCYTE [DISTWIDTH] IN BLOOD BY AUTOMATED COUNT: 80.1 FL (ref 35.9–50)
GLOBULIN SER CALC-MCNC: 3.8 G/DL (ref 1.9–3.5)
GLUCOSE BLD-MCNC: 191 MG/DL (ref 65–99)
GLUCOSE SERPL-MCNC: 140 MG/DL (ref 65–99)
HCT VFR BLD AUTO: 41 % (ref 42–52)
HGB BLD-MCNC: 13.9 G/DL (ref 14–18)
IMM GRANULOCYTES # BLD AUTO: 0.04 K/UL (ref 0–0.11)
IMM GRANULOCYTES NFR BLD AUTO: 1.1 % (ref 0–0.9)
INR PPP: 1.28 (ref 0.87–1.13)
LG PLATELETS BLD QL SMEAR: NORMAL
LIPASE SERPL-CCNC: 30 U/L (ref 7–58)
LYMPHOCYTES # BLD AUTO: 0.69 K/UL (ref 1–4.8)
LYMPHOCYTES NFR BLD: 18.6 % (ref 22–41)
MACROCYTES BLD QL SMEAR: ABNORMAL
MAGNESIUM SERPL-MCNC: 1.8 MG/DL (ref 1.5–2.5)
MCH RBC QN AUTO: 32 PG (ref 27–33)
MCHC RBC AUTO-ENTMCNC: 33.9 G/DL (ref 33.7–35.3)
MCV RBC AUTO: 94.5 FL (ref 81.4–97.8)
MONOCYTES # BLD AUTO: 0.38 K/UL (ref 0–0.85)
MONOCYTES NFR BLD AUTO: 10.3 % (ref 0–13.4)
NEUTROPHILS # BLD AUTO: 2.51 K/UL (ref 1.82–7.42)
NEUTROPHILS NFR BLD: 67.8 % (ref 44–72)
NRBC # BLD AUTO: 0 K/UL
NRBC BLD-RTO: 0 /100 WBC
OVALOCYTES BLD QL SMEAR: NORMAL
PLATELET # BLD AUTO: 93 K/UL (ref 164–446)
PLATELET BLD QL SMEAR: NORMAL
PMV BLD AUTO: 9.7 FL (ref 9–12.9)
POIKILOCYTOSIS BLD QL SMEAR: NORMAL
POLYCHROMASIA BLD QL SMEAR: NORMAL
POTASSIUM SERPL-SCNC: 2.9 MMOL/L (ref 3.6–5.5)
PROT SERPL-MCNC: 8.4 G/DL (ref 6–8.2)
PROTHROMBIN TIME: 15.9 SEC (ref 12–14.6)
RBC # BLD AUTO: 4.34 M/UL (ref 4.7–6.1)
RBC BLD AUTO: PRESENT
SODIUM SERPL-SCNC: 136 MMOL/L (ref 135–145)
TROPONIN I SERPL-MCNC: <0.02 NG/ML (ref 0–0.04)
TROPONIN I SERPL-MCNC: <0.02 NG/ML (ref 0–0.04)
TSH SERPL DL<=0.005 MIU/L-ACNC: 1.39 UIU/ML (ref 0.38–5.33)
WBC # BLD AUTO: 3.7 K/UL (ref 4.8–10.8)

## 2018-03-10 PROCEDURE — 700101 HCHG RX REV CODE 250: Performed by: FAMILY MEDICINE

## 2018-03-10 PROCEDURE — 700111 HCHG RX REV CODE 636 W/ 250 OVERRIDE (IP)

## 2018-03-10 PROCEDURE — A9270 NON-COVERED ITEM OR SERVICE: HCPCS

## 2018-03-10 PROCEDURE — G0378 HOSPITAL OBSERVATION PER HR: HCPCS

## 2018-03-10 PROCEDURE — 36415 COLL VENOUS BLD VENIPUNCTURE: CPT

## 2018-03-10 PROCEDURE — 700111 HCHG RX REV CODE 636 W/ 250 OVERRIDE (IP): Performed by: EMERGENCY MEDICINE

## 2018-03-10 PROCEDURE — 700102 HCHG RX REV CODE 250 W/ 637 OVERRIDE(OP): Performed by: FAMILY MEDICINE

## 2018-03-10 PROCEDURE — 94760 N-INVAS EAR/PLS OXIMETRY 1: CPT

## 2018-03-10 PROCEDURE — 84484 ASSAY OF TROPONIN QUANT: CPT

## 2018-03-10 PROCEDURE — 700117 HCHG RX CONTRAST REV CODE 255: Performed by: EMERGENCY MEDICINE

## 2018-03-10 PROCEDURE — A9270 NON-COVERED ITEM OR SERVICE: HCPCS | Performed by: FAMILY MEDICINE

## 2018-03-10 PROCEDURE — 700102 HCHG RX REV CODE 250 W/ 637 OVERRIDE(OP)

## 2018-03-10 PROCEDURE — 84443 ASSAY THYROID STIM HORMONE: CPT

## 2018-03-10 PROCEDURE — 85730 THROMBOPLASTIN TIME PARTIAL: CPT

## 2018-03-10 PROCEDURE — 80053 COMPREHEN METABOLIC PANEL: CPT

## 2018-03-10 PROCEDURE — 700105 HCHG RX REV CODE 258: Performed by: EMERGENCY MEDICINE

## 2018-03-10 PROCEDURE — 99285 EMERGENCY DEPT VISIT HI MDM: CPT

## 2018-03-10 PROCEDURE — 85025 COMPLETE CBC W/AUTO DIFF WBC: CPT

## 2018-03-10 PROCEDURE — 96374 THER/PROPH/DIAG INJ IV PUSH: CPT | Mod: XU

## 2018-03-10 PROCEDURE — 93005 ELECTROCARDIOGRAM TRACING: CPT | Performed by: EMERGENCY MEDICINE

## 2018-03-10 PROCEDURE — 96376 TX/PRO/DX INJ SAME DRUG ADON: CPT | Mod: XU

## 2018-03-10 PROCEDURE — 70496 CT ANGIOGRAPHY HEAD: CPT

## 2018-03-10 PROCEDURE — 99218 PR INITIAL OBSERVATION CARE,LEVL I: CPT | Performed by: FAMILY MEDICINE

## 2018-03-10 PROCEDURE — 71045 X-RAY EXAM CHEST 1 VIEW: CPT

## 2018-03-10 PROCEDURE — 96375 TX/PRO/DX INJ NEW DRUG ADDON: CPT | Mod: XU

## 2018-03-10 PROCEDURE — 83735 ASSAY OF MAGNESIUM: CPT

## 2018-03-10 PROCEDURE — 82962 GLUCOSE BLOOD TEST: CPT

## 2018-03-10 PROCEDURE — 70498 CT ANGIOGRAPHY NECK: CPT

## 2018-03-10 PROCEDURE — 83690 ASSAY OF LIPASE: CPT

## 2018-03-10 PROCEDURE — 70450 CT HEAD/BRAIN W/O DYE: CPT

## 2018-03-10 PROCEDURE — 85610 PROTHROMBIN TIME: CPT

## 2018-03-10 RX ORDER — FINASTERIDE 5 MG/1
5 TABLET, FILM COATED ORAL DAILY
Status: DISCONTINUED | OUTPATIENT
Start: 2018-03-11 | End: 2018-03-11 | Stop reason: HOSPADM

## 2018-03-10 RX ORDER — ONDANSETRON 4 MG/1
4 TABLET, ORALLY DISINTEGRATING ORAL EVERY 4 HOURS PRN
Status: DISCONTINUED | OUTPATIENT
Start: 2018-03-10 | End: 2018-03-11 | Stop reason: HOSPADM

## 2018-03-10 RX ORDER — LORAZEPAM 2 MG/ML
1 INJECTION INTRAMUSCULAR ONCE
Status: COMPLETED | OUTPATIENT
Start: 2018-03-10 | End: 2018-03-10

## 2018-03-10 RX ORDER — POLYETHYLENE GLYCOL 3350 17 G/17G
1 POWDER, FOR SOLUTION ORAL
Status: DISCONTINUED | OUTPATIENT
Start: 2018-03-10 | End: 2018-03-11 | Stop reason: HOSPADM

## 2018-03-10 RX ORDER — DEXTROSE MONOHYDRATE 25 G/50ML
25 INJECTION, SOLUTION INTRAVENOUS
Status: DISCONTINUED | OUTPATIENT
Start: 2018-03-10 | End: 2018-03-11 | Stop reason: HOSPADM

## 2018-03-10 RX ORDER — ONDANSETRON 2 MG/ML
4 INJECTION INTRAMUSCULAR; INTRAVENOUS EVERY 4 HOURS PRN
Status: DISCONTINUED | OUTPATIENT
Start: 2018-03-10 | End: 2018-03-11 | Stop reason: HOSPADM

## 2018-03-10 RX ORDER — ONDANSETRON 2 MG/ML
4 INJECTION INTRAMUSCULAR; INTRAVENOUS ONCE
Status: COMPLETED | OUTPATIENT
Start: 2018-03-10 | End: 2018-03-10

## 2018-03-10 RX ORDER — ACETAMINOPHEN 325 MG/1
650 TABLET ORAL EVERY 6 HOURS PRN
Status: DISCONTINUED | OUTPATIENT
Start: 2018-03-10 | End: 2018-03-11 | Stop reason: HOSPADM

## 2018-03-10 RX ORDER — SODIUM CHLORIDE AND POTASSIUM CHLORIDE 300; 900 MG/100ML; MG/100ML
INJECTION, SOLUTION INTRAVENOUS CONTINUOUS
Status: DISCONTINUED | OUTPATIENT
Start: 2018-03-10 | End: 2018-03-11 | Stop reason: HOSPADM

## 2018-03-10 RX ORDER — SODIUM CHLORIDE 9 MG/ML
1000 INJECTION, SOLUTION INTRAVENOUS ONCE
Status: COMPLETED | OUTPATIENT
Start: 2018-03-10 | End: 2018-03-10

## 2018-03-10 RX ORDER — TAMSULOSIN HYDROCHLORIDE 0.4 MG/1
0.4 CAPSULE ORAL EVERY EVENING
Status: DISCONTINUED | OUTPATIENT
Start: 2018-03-10 | End: 2018-03-11 | Stop reason: HOSPADM

## 2018-03-10 RX ORDER — BISOPROLOL FUMARATE 5 MG/1
2.5 TABLET, FILM COATED ORAL 2 TIMES DAILY
COMMUNITY
End: 2018-03-19

## 2018-03-10 RX ORDER — OXYCODONE HYDROCHLORIDE 5 MG/1
5 TABLET ORAL EVERY 4 HOURS PRN
Status: DISCONTINUED | OUTPATIENT
Start: 2018-03-10 | End: 2018-03-11 | Stop reason: HOSPADM

## 2018-03-10 RX ORDER — ONDANSETRON 2 MG/ML
INJECTION INTRAMUSCULAR; INTRAVENOUS
Status: COMPLETED
Start: 2018-03-10 | End: 2018-03-10

## 2018-03-10 RX ORDER — OXYCODONE HYDROCHLORIDE 5 MG/1
5-10 TABLET ORAL EVERY 4 HOURS PRN
Status: DISCONTINUED | OUTPATIENT
Start: 2018-03-10 | End: 2018-03-10

## 2018-03-10 RX ORDER — OXYCODONE HYDROCHLORIDE 10 MG/1
10 TABLET ORAL EVERY 4 HOURS PRN
Status: DISCONTINUED | OUTPATIENT
Start: 2018-03-10 | End: 2018-03-11 | Stop reason: HOSPADM

## 2018-03-10 RX ORDER — BISACODYL 10 MG
10 SUPPOSITORY, RECTAL RECTAL
Status: DISCONTINUED | OUTPATIENT
Start: 2018-03-10 | End: 2018-03-11 | Stop reason: HOSPADM

## 2018-03-10 RX ORDER — HYDROXYUREA 500 MG/1
500 CAPSULE ORAL SEE ADMIN INSTRUCTIONS
Status: ON HOLD | COMMUNITY
End: 2020-08-31 | Stop reason: SDUPTHER

## 2018-03-10 RX ORDER — AMOXICILLIN 250 MG
2 CAPSULE ORAL 2 TIMES DAILY
Status: DISCONTINUED | OUTPATIENT
Start: 2018-03-10 | End: 2018-03-11 | Stop reason: HOSPADM

## 2018-03-10 RX ORDER — OXYCODONE HYDROCHLORIDE 5 MG/1
TABLET ORAL
Status: COMPLETED
Start: 2018-03-10 | End: 2018-03-10

## 2018-03-10 RX ADMIN — POTASSIUM CHLORIDE AND SODIUM CHLORIDE: 900; 300 INJECTION, SOLUTION INTRAVENOUS at 20:24

## 2018-03-10 RX ADMIN — PROCHLORPERAZINE EDISYLATE 10 MG: 5 INJECTION INTRAMUSCULAR; INTRAVENOUS at 20:24

## 2018-03-10 RX ADMIN — ONDANSETRON 4 MG: 2 INJECTION INTRAMUSCULAR; INTRAVENOUS at 15:16

## 2018-03-10 RX ADMIN — SODIUM CHLORIDE 1000 ML: 9 INJECTION, SOLUTION INTRAVENOUS at 15:23

## 2018-03-10 RX ADMIN — LORAZEPAM 1 MG: 2 INJECTION INTRAMUSCULAR; INTRAVENOUS at 15:23

## 2018-03-10 RX ADMIN — APIXABAN 5 MG: 5 TABLET, FILM COATED ORAL at 22:09

## 2018-03-10 RX ADMIN — TAMSULOSIN HYDROCHLORIDE 0.4 MG: 0.4 CAPSULE ORAL at 22:09

## 2018-03-10 RX ADMIN — ONDANSETRON 4 MG: 2 INJECTION INTRAMUSCULAR; INTRAVENOUS at 15:52

## 2018-03-10 RX ADMIN — OXYCODONE HYDROCHLORIDE 10 MG: 10 TABLET ORAL at 22:08

## 2018-03-10 RX ADMIN — IOHEXOL 100 ML: 350 INJECTION, SOLUTION INTRAVENOUS at 15:56

## 2018-03-10 ASSESSMENT — ENCOUNTER SYMPTOMS
VOMITING: 1
NAUSEA: 1
WHEEZING: 0
BLURRED VISION: 0
DIZZINESS: 1
LOSS OF CONSCIOUSNESS: 0
CHILLS: 0
FEVER: 0
ABDOMINAL PAIN: 0
SENSORY CHANGE: 0
SORE THROAT: 0
FOCAL WEAKNESS: 0
HEARTBURN: 0
COUGH: 0
HEADACHES: 0
SEIZURES: 0
DIARRHEA: 0
SPEECH CHANGE: 0
WEAKNESS: 1
SHORTNESS OF BREATH: 0

## 2018-03-10 ASSESSMENT — COPD QUESTIONNAIRES
COPD SCREENING SCORE: 2
HAVE YOU SMOKED AT LEAST 100 CIGARETTES IN YOUR ENTIRE LIFE: NO/DON'T KNOW
DURING THE PAST 4 WEEKS HOW MUCH DID YOU FEEL SHORT OF BREATH: NONE/LITTLE OF THE TIME
DO YOU EVER COUGH UP ANY MUCUS OR PHLEGM?: NO/ONLY WITH OCCASIONAL COLDS OR INFECTIONS

## 2018-03-10 ASSESSMENT — CHA2DS2 SCORE
CHF OR LEFT VENTRICULAR DYSFUNCTION: YES
HYPERTENSION: NO
PRIOR STROKE OR TIA OR THROMBOEMBOLISM: YES
AGE 65 TO 74: YES
SEX: MALE
DIABETES: YES
AGE 75 OR GREATER: NO
CHA2DS2 VASC SCORE: 6
VASCULAR DISEASE: YES

## 2018-03-10 ASSESSMENT — PATIENT HEALTH QUESTIONNAIRE - PHQ9
SUM OF ALL RESPONSES TO PHQ QUESTIONS 1-9: 0
2. FEELING DOWN, DEPRESSED, IRRITABLE, OR HOPELESS: NOT AT ALL
SUM OF ALL RESPONSES TO PHQ9 QUESTIONS 1 AND 2: 0
1. LITTLE INTEREST OR PLEASURE IN DOING THINGS: NOT AT ALL

## 2018-03-10 ASSESSMENT — PAIN SCALES - GENERAL: PAINLEVEL_OUTOF10: 7

## 2018-03-10 ASSESSMENT — LIFESTYLE VARIABLES
ALCOHOL_USE: NO
EVER_SMOKED: NEVER
EVER_SMOKED: NEVER

## 2018-03-10 NOTE — ED NOTES
Chief Complaint   Patient presents with   • Chest Pain     Pt states acute onset of CP, vomiting, diaphoresis.  Pt pale and vomiting upon presentation.     Pt also had severe dizziness.

## 2018-03-10 NOTE — ED PROVIDER NOTES
ED Provider Note    CHIEF COMPLAINT  Chief Complaint   Patient presents with   • Chest Pain     Pt states acute onset of CP, vomiting, diaphoresis.  Pt pale and vomiting upon presentation.       HPI  Francesco Schulte is a 67 y.o. male who presents to the ER complaining of abrupt onset of vertigo.  The patient says all of a sudden his vision turn everyone was at 90° and he had abrupt onset of dizziness.  Dizziness is described as spinning and things moving.  Absolutely denies any chest discomfort at all.  No chest pressure, burning or tearing sensation.  No shortness of breath.  The spinning is so severe that it makes him nauseated.  Denies any focal numbness, tingling or weakness.  She feels disoriented and feels out of it.  Denies any previous stroke.  States he is a 4 previous heart attack.  This is nothing like a heart attack.  Denies any chest pain.  Denies any other acute concerns or complaints.  Felt fine, back to this.  States chronic blood thinners for polycythemia.  No other acute concerns or complaints    REVIEW OF SYSTEMS  See HPI for further details. All other systems are negative.    PAST MEDICAL HISTORY  Past Medical History:   Diagnosis Date   • Diabetes (CMS-[facility])    • Heart attack    • Ischemic cardiomyopathy    • Kidney stones    • Polycythemia vera(238.4)    • Stroke (CMS-[facility])        FAMILY HISTORY  No family history on file.    SOCIAL HISTORY  Social History     Social History   • Marital status:      Spouse name: N/A   • Number of children: N/A   • Years of education: N/A     Social History Main Topics   • Smoking status: Former Smoker   • Smokeless tobacco: Never Used   • Alcohol use No      Comment: occ   • Drug use: No   • Sexual activity: Not on file     Other Topics Concern   • Not on file     Social History Narrative   • No narrative on file       SURGICAL HISTORY  Past Surgical History:   Procedure Laterality Date   • CATH PLACEMENT      right arm   • LAMINOTOMY       "discectomy    • OTHER CARDIAC SURGERY      stents x 3       CURRENT MEDICATIONS  Home Medications    **Home medications have not yet been reviewed for this encounter**       No current facility-administered medications on file prior to encounter.      Current Outpatient Prescriptions on File Prior to Encounter   Medication Sig Dispense Refill   • apixaban (ELIQUIS) 5mg Tab Take 1 Tab by mouth 2 Times a Day. 180 Tab 1   • ticagrelor (BRILINTA) 90 MG Tab tablet Take 1 Tab by mouth 2 Times a Day. 180 Tab 3   • famotidine (PEPCID) 20 MG Tab Take 1 Tab by mouth as needed (after hydrea). 60 Tab 0   • insulin glargine (LANTUS) 100 UNIT/ML Solution Inject 20 Units as instructed every evening.     • tamsulosin (FLOMAX) 0.4 MG capsule Take 0.4 mg by mouth every evening.     • finasteride (PROSCAR) 5 MG Tab Take 5 mg by mouth every day.     • colesevelam (WELCHOL) 625 MG Tab Take 625 mg by mouth 2 times a day, with meals.     • glipiZIDE (GLUCOTROL) 10 MG Tab Take 10 mg by mouth every evening.           ALLERGIES  Allergies   Allergen Reactions   • Metformin Unspecified     \"Similar to a heart attack, I was hospitalized\"   • Simvastatin      Myalgias   • Statins [Hmg-Coa-R Inhibitors]      Muscle ache, joint pain       PHYSICAL EXAM  VITAL SIGNS: Ht 1.88 m (6' 2\")   Wt 95.3 kg (210 lb)   BMI 26.96 kg/m²    BP (!) 214/102   Pulse 70   Temp (!) 35.4 °C (95.8 °F) Comment: pt diaphoretic  Resp 18   Ht 1.88 m (6' 2\")   Wt 95.3 kg (210 lb)   SpO2 96%   BMI 26.96 kg/m²     Constitutional: Awake, alert, vomiting, diaphoretic, anxious appearing  HENT: Normocephalic, Atraumatic, Bilateral external ears normal, Oropharynx moist, No oral exudates, Nose normal.   Eyes: PERRL, EOMI, Conjunctiva normal, No discharge. .  No appreciable nystagmus.  Neck: Normal range of motion, No tenderness, Supple, No stridor.     Cardiovascular: Normal heart rate, Normal rhythm, No murmurs, No rubs, No gallops.   Thorax & Lungs: Normal breath " sounds, No respiratory distress, No wheezing, No chest tenderness.   Abdomen: Bowel sounds normal, Soft, No tenderness  Skin: Warm, Dry, No erythema, No rash.   Back: No tenderness, No CVA tenderness.   Musculoskeletal: Good range of motion in all major joints. No tenderness to palpation or major deformities noted. .  Good pulses in all extremities.  Neurologic: Alert and oriented.  The patient occasionally says things like where MI and what happened, but then seems to be oriented towards going on.  Cranial nerves are intact..  Finger to nose, heel to shin are intact.  Sensation is normal.  No pronator drift.  No weakness.    Psychiatric: Affect normal,      EKG Interpretation 15;09    Interpreted by me    Rhythm: normal sinus   Rate: 70  Axis: -51  Ectopy: none  Conduction: Incomplete left bundle-branch block.  ST Segments: Nonspecific anterior ST T changes.  T Waves: no acute change  Q Waves: anterior q waves      Clinical Impression: Normal sinus rhythm with no acute change from previous EKG from 814 2017    EKG Interpretation 15;58    Interpreted by me    Rhythm: normal sinus   Rate: 84  Axis: -51  Ectopy: none  Conduction: Incomplete left bundle-branch block.  ST Segments: Nonspecific anterior ST T changes.  T Waves: no acute change  Q Waves: anterior q waves      Clinical Impression: Normal sinus rhythm with no acute change from previous EKG from 814 2017  RADIOLOGY/PROCEDURES  DX-CHEST-PORTABLE (1 VIEW)   Final Result      No acute cardiopulmonary abnormality identified.      CT-CTA NECK WITH & W/O-POST PROCESSING   Final Result      1.  Atherosclerotic plaque at the right carotid bifurcation which results in 90% diameter narrowing.      2.  Atherosclerotic plaque at the left carotid bifurcation which results in less than 50% diameter narrowing.      3.  Diminutive left vertebral artery.      This was discussed with FUNMI HIDALGO at 4:17 PM on 3/10/2018.      CT-CTA HEAD WITH & W/O-POST PROCESS   Final  Result      No evidence of intracranial vascular occlusion or aneurysm.      CT-HEAD W/O   Final Result      No acute intracranial abnormality      MR-BRAIN-W/O    (Results Pending)         COURSE & MEDICAL DECISION MAKING  Pertinent Labs & Imaging studies reviewed. (See chart for details)    The patient presents with acute severe vertigo causing dizziness.  The absence of nystagmus.  The adenoids.  Denies chest pain.  EKG is abnormal but unchanged from prior EKGs.  There is no symptoms to suggest dissection or an MI.  Adamantly denies any chest pain, even transiently to me.  No tearing component.  Denies any abdominal discomfort.  Broad differential diagnosis was considered including benign positional vertigo, vertebrobasilar insufficiency, posterior protein and a few.    Patient is worked up with a head CT, as well as CTAs to rule out the above differential diagnosis.  He is given Zofran ×2 doses and Ativan with good response.  He is diaphoretic and ill-appearing, is given 1 L of normal saline for protection from CT contrast dye.     CT shows chronic but no acute findings.    I spoke with Dr. Lion.  The patient's NIH stroke score is 0.  In light of a non-measurable stroke scale.  He did not fill alteplase was indicated nor did he feel he should be transferred immediately.  The patient also has a contraindication, and that he takes eliquis      The patient requires MRI to rule out acute stroke.  Certainly posterior circulation stroke remains.  The differential diagnosis.  He is not a candidate for alteplase.  I'll try to do an MRI, but has been unable to tolerate ligamentous scanner.  He vomits and does not protect his airway.  He is pulled out.  Hospitalist is made aware.  He'll be admitted to the hospital.  2 troponins and 2 EKGs are unremarkable.  I think it unlikely this is an acute coronary syndrome.  His belly remains benign.  Potassium is a little low.  He'll be admitted for continued workup and treatment.   He is moving critical condition.    Care is transfered to Dr. Ramirez.    FINAL IMPRESSION  1. Intractable vomiting with nausea, unspecified vomiting type    2. Dizziness    3. Vertigo        2.   3.         Electronically signed by: Hima Martinez, 3/10/2018 3:19 PM

## 2018-03-11 ENCOUNTER — APPOINTMENT (OUTPATIENT)
Dept: RADIOLOGY | Facility: MEDICAL CENTER | Age: 68
End: 2018-03-11
Attending: EMERGENCY MEDICINE
Payer: MEDICARE

## 2018-03-11 ENCOUNTER — PATIENT OUTREACH (OUTPATIENT)
Dept: HEALTH INFORMATION MANAGEMENT | Facility: OTHER | Age: 68
End: 2018-03-11

## 2018-03-11 VITALS
WEIGHT: 224.87 LBS | RESPIRATION RATE: 19 BRPM | SYSTOLIC BLOOD PRESSURE: 125 MMHG | DIASTOLIC BLOOD PRESSURE: 75 MMHG | TEMPERATURE: 98.1 F | BODY MASS INDEX: 28.86 KG/M2 | HEART RATE: 64 BPM | HEIGHT: 74 IN | OXYGEN SATURATION: 96 %

## 2018-03-11 PROBLEM — I65.29 CAROTID STENOSIS: Status: ACTIVE | Noted: 2018-03-10

## 2018-03-11 LAB
ANION GAP SERPL CALC-SCNC: 7 MMOL/L (ref 0–11.9)
BASOPHILS # BLD AUTO: 0.3 % (ref 0–1.8)
BASOPHILS # BLD: 0.01 K/UL (ref 0–0.12)
BUN SERPL-MCNC: 14 MG/DL (ref 8–22)
CALCIUM SERPL-MCNC: 8.7 MG/DL (ref 8.4–10.2)
CHLORIDE SERPL-SCNC: 107 MMOL/L (ref 96–112)
CO2 SERPL-SCNC: 22 MMOL/L (ref 20–33)
CREAT SERPL-MCNC: 0.88 MG/DL (ref 0.5–1.4)
EOSINOPHIL # BLD AUTO: 0 K/UL (ref 0–0.51)
EOSINOPHIL NFR BLD: 0 % (ref 0–6.9)
ERYTHROCYTE [DISTWIDTH] IN BLOOD BY AUTOMATED COUNT: 77.9 FL (ref 35.9–50)
GLUCOSE BLD-MCNC: 131 MG/DL (ref 65–99)
GLUCOSE BLD-MCNC: 199 MG/DL (ref 65–99)
GLUCOSE SERPL-MCNC: 181 MG/DL (ref 65–99)
HCT VFR BLD AUTO: 37.8 % (ref 42–52)
HGB BLD-MCNC: 12.9 G/DL (ref 14–18)
IMM GRANULOCYTES # BLD AUTO: 0.03 K/UL (ref 0–0.11)
IMM GRANULOCYTES NFR BLD AUTO: 0.9 % (ref 0–0.9)
LYMPHOCYTES # BLD AUTO: 0.35 K/UL (ref 1–4.8)
LYMPHOCYTES NFR BLD: 10.4 % (ref 22–41)
MCH RBC QN AUTO: 32.3 PG (ref 27–33)
MCHC RBC AUTO-ENTMCNC: 34.1 G/DL (ref 33.7–35.3)
MCV RBC AUTO: 94.5 FL (ref 81.4–97.8)
MONOCYTES # BLD AUTO: 0.19 K/UL (ref 0–0.85)
MONOCYTES NFR BLD AUTO: 5.6 % (ref 0–13.4)
NEUTROPHILS # BLD AUTO: 2.79 K/UL (ref 1.82–7.42)
NEUTROPHILS NFR BLD: 82.8 % (ref 44–72)
NRBC # BLD AUTO: 0 K/UL
NRBC BLD-RTO: 0 /100 WBC
PLATELET # BLD AUTO: 96 K/UL (ref 164–446)
POTASSIUM SERPL-SCNC: 3.5 MMOL/L (ref 3.6–5.5)
RBC # BLD AUTO: 4 M/UL (ref 4.7–6.1)
SODIUM SERPL-SCNC: 136 MMOL/L (ref 135–145)
TROPONIN I SERPL-MCNC: 0.02 NG/ML (ref 0–0.04)
TROPONIN I SERPL-MCNC: 0.02 NG/ML (ref 0–0.04)
WBC # BLD AUTO: 3.4 K/UL (ref 4.8–10.8)

## 2018-03-11 PROCEDURE — 99217 PR OBSERVATION CARE DISCHARGE: CPT | Performed by: INTERNAL MEDICINE

## 2018-03-11 PROCEDURE — 96361 HYDRATE IV INFUSION ADD-ON: CPT

## 2018-03-11 PROCEDURE — 84484 ASSAY OF TROPONIN QUANT: CPT

## 2018-03-11 PROCEDURE — 700101 HCHG RX REV CODE 250: Performed by: FAMILY MEDICINE

## 2018-03-11 PROCEDURE — 82962 GLUCOSE BLOOD TEST: CPT

## 2018-03-11 PROCEDURE — 85025 COMPLETE CBC W/AUTO DIFF WBC: CPT

## 2018-03-11 PROCEDURE — 70551 MRI BRAIN STEM W/O DYE: CPT

## 2018-03-11 PROCEDURE — G0378 HOSPITAL OBSERVATION PER HR: HCPCS

## 2018-03-11 PROCEDURE — 700102 HCHG RX REV CODE 250 W/ 637 OVERRIDE(OP): Performed by: FAMILY MEDICINE

## 2018-03-11 PROCEDURE — 80048 BASIC METABOLIC PNL TOTAL CA: CPT

## 2018-03-11 PROCEDURE — A9270 NON-COVERED ITEM OR SERVICE: HCPCS | Performed by: FAMILY MEDICINE

## 2018-03-11 RX ADMIN — OXYCODONE HYDROCHLORIDE 5 MG: 5 TABLET ORAL at 07:40

## 2018-03-11 RX ADMIN — FINASTERIDE 5 MG: 5 TABLET, FILM COATED ORAL at 08:57

## 2018-03-11 RX ADMIN — OXYCODONE HYDROCHLORIDE 5 MG: 5 TABLET ORAL at 14:47

## 2018-03-11 RX ADMIN — APIXABAN 5 MG: 5 TABLET, FILM COATED ORAL at 08:57

## 2018-03-11 RX ADMIN — POTASSIUM CHLORIDE AND SODIUM CHLORIDE: 900; 300 INJECTION, SOLUTION INTRAVENOUS at 12:35

## 2018-03-11 RX ADMIN — TICAGRELOR 90 MG: 90 TABLET ORAL at 09:25

## 2018-03-11 ASSESSMENT — CHA2DS2 SCORE
AGE 65 TO 74: YES
CHA2DS2 VASC SCORE: 6
AGE 75 OR GREATER: NO
SEX: MALE
CHF OR LEFT VENTRICULAR DYSFUNCTION: YES
VASCULAR DISEASE: YES
DIABETES: YES
HYPERTENSION: NO
PRIOR STROKE OR TIA OR THROMBOEMBOLISM: YES

## 2018-03-11 ASSESSMENT — LIFESTYLE VARIABLES: EVER_SMOKED: NEVER

## 2018-03-11 ASSESSMENT — PAIN SCALES - GENERAL
PAINLEVEL_OUTOF10: 6
PAINLEVEL_OUTOF10: 0

## 2018-03-11 NOTE — PROGRESS NOTES
Telemetry Summary    Rhythm SB/SR  HR 50s-70s  Ectopy Frequent PVC, Occasional Couplets  FL 0.18  QRS 0.12  QT 0.44

## 2018-03-11 NOTE — ED NOTES
The Medication Reconciliation process has been PARTIALLY completed by interviewing the patient, his wife and a call to his only reported pharmacy who did not fill half of his meds but they can not tell me where the others came from.  There is also a questionable bisoprolol that they are not sure if he is taking.     Allergies have been reviewed  Antibiotic use in 30 days - none     Home Pharmacy:  [facility] [place]

## 2018-03-11 NOTE — H&P
Hospital Medicine History and Physical    Date of Service  3/10/2018    Chief Complaint  Chief Complaint   Patient presents with   • Chest Pain     Pt states acute onset of CP, vomiting, diaphoresis.  Pt pale and vomiting upon presentation.       History of Presenting Illness  67 y.o. male who presented 3/10/2018 with Dizziness, nausea vomiting, sudden onset which started this afternoon. Patient states he was feeling fine when he woke up this morning, he denies any fever or chills, chest pain palpitations of breath, abdominal pain. He does any recent illness. Patient states the only thing that helps is closing his eyes to relieve the dizziness. CT scan of head was negative. CT of head and neck are noted below. MRI of brain was ordered however patient could not lay down still because of the dizziness. Denies any numbness or weakness. He has history of polycythemia vera, his counts are down. He is on Eliquis for atrial fibrillation, also Brilinta for history of stents secondary to his CAD.   Primary Care Physician  CONNER Harman.    Consultants  None    Code Status  Full    Review of Systems  Review of Systems   Constitutional: Positive for malaise/fatigue. Negative for chills and fever.   HENT: Negative for hearing loss and sore throat.    Eyes: Negative for blurred vision.   Respiratory: Negative for cough, shortness of breath and wheezing.    Cardiovascular: Negative for chest pain and leg swelling.   Gastrointestinal: Positive for nausea and vomiting. Negative for abdominal pain, diarrhea and heartburn.   Genitourinary: Negative for dysuria.   Neurological: Positive for dizziness and weakness. Negative for sensory change, speech change, focal weakness, seizures, loss of consciousness and headaches.        Past Medical History  Past Medical History:   Diagnosis Date   • Diabetes (CMS-[facility])    • Heart attack    • Ischemic cardiomyopathy    • Kidney stones    • Polycythemia vera(238.4)    • Stroke (CMS-[facility])   "      Surgical History  Past Surgical History:   Procedure Laterality Date   • CATH PLACEMENT      right arm   • LAMINOTOMY      discectomy    • OTHER CARDIAC SURGERY      stents x 3       Medications  No current facility-administered medications on file prior to encounter.      Current Outpatient Prescriptions on File Prior to Encounter   Medication Sig Dispense Refill   • apixaban (ELIQUIS) 5mg Tab Take 1 Tab by mouth 2 Times a Day. 180 Tab 1   • ticagrelor (BRILINTA) 90 MG Tab tablet Take 1 Tab by mouth 2 Times a Day. 180 Tab 3   • famotidine (PEPCID) 20 MG Tab Take 1 Tab by mouth as needed (after hydrea). 60 Tab 0   • insulin glargine (LANTUS) 100 UNIT/ML Solution Inject 20 Units as instructed every evening.     • tamsulosin (FLOMAX) 0.4 MG capsule Take 0.4 mg by mouth every evening.     • finasteride (PROSCAR) 5 MG Tab Take 5 mg by mouth every day.     • colesevelam (WELCHOL) 625 MG Tab Take 625 mg by mouth 2 times a day, with meals.     • glipiZIDE (GLUCOTROL) 10 MG Tab Take 10 mg by mouth every evening.         Family History  No family history on file.    Social History  Social History   Substance Use Topics   • Smoking status: Former Smoker   • Smokeless tobacco: Never Used   • Alcohol use No      Comment: occ       Allergies  Allergies   Allergen Reactions   • Metformin Unspecified     \"Similar to a heart attack, I was hospitalized\"   • Simvastatin      Myalgias   • Statins [Hmg-Coa-R Inhibitors]      Muscle ache, joint pain        Physical Exam  Laboratory   Hemodynamics  Temp (24hrs), Av.4 °C (95.8 °F), Min:35.4 °C (95.8 °F), Max:35.4 °C (95.8 °F)   Temperature: (!) 35.4 °C (95.8 °F) (pt diaphoretic)  Pulse  Av.7  Min: 64  Max: 112 Heart Rate (Monitored): 67  Blood Pressure : (!) 214/102, NIBP: 140/77      Respiratory      Respiration: 16, Pulse Oximetry: 98 %             Physical Exam   Constitutional: He is oriented to person, place, and time. He appears well-developed and well-nourished. "   HENT:   Head: Normocephalic and atraumatic.   Eyes: Conjunctivae are normal. Pupils are equal, round, and reactive to light.   Neck: No tracheal deviation present. No thyromegaly present.   Cardiovascular: Normal rate and regular rhythm.    Pulmonary/Chest: Effort normal and breath sounds normal.   Abdominal: Soft. Bowel sounds are normal. He exhibits no distension. There is no tenderness.   Musculoskeletal: He exhibits edema.   Lymphadenopathy:     He has no cervical adenopathy.   Neurological: He is alert and oriented to person, place, and time. No cranial nerve deficit.   MMT 5/5   Skin: Skin is warm and dry.   Nursing note and vitals reviewed.      Recent Labs      03/10/18   1510   WBC  3.7*   RBC  4.34*   HEMOGLOBIN  13.9*   HEMATOCRIT  41.0*   MCV  94.5   MCH  32.0   MCHC  33.9   RDW  80.1*   PLATELETCT  93*   MPV  9.7     Recent Labs      03/10/18   1510   SODIUM  136   POTASSIUM  2.9*   CHLORIDE  106   CO2  19*   GLUCOSE  140*   BUN  16   CREATININE  1.08   CALCIUM  9.5     Recent Labs      03/10/18   1510   ALTSGPT  19   ASTSGOT  21   ALKPHOSPHAT  53   TBILIRUBIN  1.0   LIPASE  30   GLUCOSE  140*     Recent Labs      03/10/18   1510   APTT  31.1   INR  1.28*             Lab Results   Component Value Date    TROPONINI <0.02 03/10/2018     Urinalysis:    Lab Results  Component Value Date/Time   SPECGRAVITY 1.013 03/16/2017 2016   GLUCOSEUR Negative 03/16/2017 2016   KETONES Negative 03/16/2017 2016   NITRITE Negative 03/16/2017 2016        Imaging    CT Head  No acute abnormality    CTA Head/Neck  No evidence of intracranial vascular occlusion or aneurysm  Atherosclerotic plaque at the right carotid bifurcation which results in 90% diameter narrowing.  Atherosclerotic plaque at the left carotid bifurcation which results in less than 50% diameter narrowing.  Diminutive left vertebral artery.   Assessment/Plan     I anticipate this patient is appropriate for observation status at this time.    * Dizziness-  (present on admission)   Assessment & Plan    For MRI brain        Nausea & vomiting- (present on admission)   Assessment & Plan    Clear liquids for now  Cautious IVF hydration        Carotid artery disease (CMS-HCC)- (present on admission)   Assessment & Plan    May need evaluation by vascular surgery        Hypokalemia- (present on admission)   Assessment & Plan    IV KCl, follow BMP, magnesium        Leukopenia- (present on admission)   Assessment & Plan    Hold Hydrea  Follow CBC        Thrombocytopenia (CMS-HCC)- (present on admission)   Assessment & Plan    Hold Hydrea  Follow CBC        History of stroke- (present on admission)   Assessment & Plan    Continue Eliquis, unable to take statin        CAD (coronary artery disease)- (present on admission)   Assessment & Plan    History of stents  Continue Brilinta  Serial troponin        DM2 (diabetes mellitus, type 2) (CMS-HCC)- (present on admission)   Assessment & Plan    SSI for now        Chronic systolic CHF (congestive heart failure) (CMS-HCC)- (present on admission)   Assessment & Plan    No current medications        Paroxysmal atrial fibrillation (CMS-HCC)- (present on admission)   Assessment & Plan    Continue Eliquis        Polycythemia- (present on admission)   Assessment & Plan    Hold Hydrea  Follow CBC        Dyslipidemia- (present on admission)   Assessment & Plan    Unable to take statin            VTE prophylaxis: Eliquis.

## 2018-03-11 NOTE — CARE PLAN
Problem: Safety  Goal: Will remain free from injury  Outcome: PROGRESSING AS EXPECTED  Pt encouraged to call for assistance as needed. Safety precautions in place. Regular rounding.    Problem: Respiratory:  Goal: Respiratory status will improve  Outcome: PROGRESSING AS EXPECTED  Respiratory status assessed during shift. O2 in place. RT protocol.

## 2018-03-11 NOTE — DISCHARGE INSTRUCTIONS
Discharge Instructions    Discharged to home by car with relative. Discharged via wheelchair, hospital escort: Yes.  Special equipment needed: Not Applicable    Be sure to schedule a follow-up appointment with your primary care doctor or any specialists as instructed.     Discharge Plan:   Diet Plan: Discussed  Activity Level: Discussed  Confirmed Follow up Appointment: Patient to Call and Schedule Appointment  Confirmed Symptoms Management: Discussed  Medication Reconciliation Updated: Yes  Influenza Vaccine Indication: Not indicated: Previously immunized this influenza season and > 8 years of age    I understand that a diet low in cholesterol, fat, and sodium is recommended for good health. Unless I have been given specific instructions below for another diet, I accept this instruction as my diet prescription.   Other diet: none    Special Instructions: None    · Is patient discharged on Warfarin / Coumadin?   No       Carotid Artery Disease  The carotid arteries are arteries on both sides of the neck. They carry blood to the brain. Carotid artery disease is when the arteries get smaller (narrow) or get blocked. If these arteries get smaller or get blocked, you are more likely to have a stroke or warning stroke (transient ischemic attack).  Follow these instructions at home:  · Take medicines as told by your doctor. Make sure you understand all your medicine instructions. Do not stop your medicines without talking to your doctor first.  · Follow your doctor's diet instructions. It is important to eat a healthy diet that includes plenty of:  ¨ Fresh fruits.  ¨ Vegetables.  ¨ Lean meats.  · Avoid:  ¨ High-fat foods.  ¨ High-sodium foods.  ¨ Foods that are fried, overly processed, or have poor nutritional value.  · Stay a healthy weight.  · Stay active. Get at least 30 minutes of activity every day.  · Do not smoke.  · Limit alcohol use to:  ¨ No more than 2 drinks a day for men.  ¨ No more than 1 drink a day for  women who are not pregnant.  · Do not use illegal drugs.  · Keep all doctor visits as told.  Get help right away if:  · You have sudden weakness or loss of feeling (numbness) on one side of the body, such as the face, arm, or leg.  · You have sudden confusion.  · You have trouble speaking (aphasia) or understanding.  · You have sudden trouble seeing out of one or both eyes.  · You have sudden trouble walking.  · You have dizziness or feel like you might pass out (faint).  · You have a loss of balance or your movements are not steady (uncoordinated).  · You have a sudden, severe headache with no known cause.  · You have trouble swallowing (dysphagia).  Call your local emergency services (911 in [place] Do not drive yourself to the clinic or hospital.  This information is not intended to replace advice given to you by your health care provider. Make sure you discuss any questions you have with your health care provider.  Document Released: 12/04/2013 Document Revised: 05/25/2017 Document Reviewed: 06/18/2014  [employer] Interactive Patient Education © 2017 [employer]      Dizziness  Dizziness is a common problem. It makes you feel unsteady or lightheaded. You may feel like you are about to pass out (faint). Dizziness can lead to injury if you stumble or fall. Anyone can get dizzy, but dizziness is more common in older adults. This condition can be caused by a number of things, including:  · Medicines.  · Dehydration.  · Illness.  Follow these instructions at home:  Following these instructions may help with your condition:  Eating and drinking  · Drink enough fluid to keep your pee (urine) clear or pale yellow. This helps to keep you from getting dehydrated. Try to drink more clear fluids, such as water.  · Do not drink alcohol.  · Limit how much caffeine you drink or eat if told by your doctor.  · Limit how much salt you drink or eat if told by your doctor.  Activity  · Avoid making quick movements.  ¨ When you stand  up from sitting in a chair, steady yourself until you feel okay.  ¨ In the morning, first sit up on the side of the bed. When you feel okay, stand slowly while you hold onto something. Do this until you know that your balance is fine.  · Move your legs often if you need to  one place for a long time. Tighten and relax your muscles in your legs while you are standing.  · Do not drive or use heavy machinery if you feel dizzy.  · Avoid bending down if you feel dizzy. Place items in your home so that they are easy for you to reach without leaning over.  Lifestyle  · Do not use any tobacco products, including cigarettes, chewing tobacco, or electronic cigarettes. If you need help quitting, ask your doctor.  · Try to lower your stress level, such as with yoga or meditation. Talk with your doctor if you need help.  General instructions  · Watch your dizziness for any changes.  · Take medicines only as told by your doctor. Talk with your doctor if you think that your dizziness is caused by a medicine that you are taking.  · Tell a friend or a family member that you are feeling dizzy. If he or she notices any changes in your behavior, have this person call your doctor.  · Keep all follow-up visits as told by your doctor. This is important.  Contact a doctor if:  · Your dizziness does not go away.  · Your dizziness or light-headedness gets worse.  · You feel sick to your stomach (nauseous).  · You have trouble hearing.  · You have new symptoms.  · You are unsteady on your feet or you feel like the room is spinning.  Get help right away if:  · You throw up (vomit) or have diarrhea and are unable to eat or drink anything.  · You have trouble:  ¨ Talking.  ¨ Walking.  ¨ Swallowing.  ¨ Using your arms, hands, or legs.  · You feel generally weak.  · You are not thinking clearly or you have trouble forming sentences. It may take a friend or family member to notice this.  · You have:  ¨ Chest pain.  ¨ Pain in your belly  (abdomen).  ¨ Shortness of breath.  ¨ Sweating.  · Your vision changes.  · You are bleeding.  · You have a headache.  · You have neck pain or a stiff neck.  · You have a fever.  This information is not intended to replace advice given to you by your health care provider. Make sure you discuss any questions you have with your health care provider.  Document Released: 12/06/2012 Document Revised: 05/25/2017 Document Reviewed: 12/14/2015  [employer] Interactive Patient Education © 2017 [employer]      Depression / Suicide Risk    As you are discharged from this [facility] facility, it is important to learn how to keep safe from harming yourself.    Recognize the warning signs:  · Abrupt changes in personality, positive or negative- including increase in energy   · Giving away possessions  · Change in eating patterns- significant weight changes-  positive or negative  · Change in sleeping patterns- unable to sleep or sleeping all the time   · Unwillingness or inability to communicate  · Depression  · Unusual sadness, discouragement and loneliness  · Talk of wanting to die  · Neglect of personal appearance   · Rebelliousness- reckless behavior  · Withdrawal from people/activities they love  · Confusion- inability to concentrate     If you or a loved one observes any of these behaviors or has concerns about self-harm, here's what you can do:  · Talk about it- your feelings and reasons for harming yourself  · Remove any means that you might use to hurt yourself (examples: pills, rope, extension cords, firearm)  · Get professional help from the community (Mental Health, Substance Abuse, psychological counseling)  · Do not be alone:Call your Safe Contact- someone whom you trust who will be there for you.  · Call your local CRISIS HOTLINE 677-2281 or 856-789-9088  · Call your local Children's Mobile Crisis Response Team [place] (666) 711-6879 or www.Help Scout  · Call the toll free National Suicide Prevention  Hotlines   · National Suicide Prevention Lifeline 396-318-PMFX (6510)  · National Hope Line Network 800-SUICIDE (912-7242)

## 2018-03-11 NOTE — PROGRESS NOTES
Pt arrived to floor via gurney. Pt settled into bed with safety precautions in place. Family at bedside.

## 2018-03-11 NOTE — PROGRESS NOTES
Tele strip at 0629 shows SB w/ HR of 52  Measurements: .16/.10/.48    Tele Shift Summary:  Rhythm: SR/SB  Rate: 50's-60's  Ectopy: Per CCT Melody, pt had frequent PVC's.    Telemetry monitoring strips placed in pt chart.

## 2018-03-11 NOTE — ED NOTES
Pt vomited, linens changed, awaiting MRI.  Pt c/o MD CHAS aware, would like pt to wait until after MRI.

## 2018-03-11 NOTE — FLOWSHEET NOTE
03/10/18 2202   Type of Assessment   Assessment Yes   Patient History   Pulmonary Diagnosis none   Surgical Procedures none   Home O2 No   Home Treatments/Frequency No   COPD Risk Screening   Do you have a history of COPD? No   COPD Population Screener   During the past 4 weeks, how much did you feel short of breath? 0   Do you ever cough up any mucus or phlegm? 0   In the past 12 months, you do less than you used to because of your breathing problems 0   Have you smoked at least 100 cigarettes in your entire life? 0   How old are you? 2   COPD Screening Score 2   Smoking History   Have you Ever Smoked Never   Level Of Consciousness   Level of Consciousness Alert   Respiratory WDL   Respiratory (WDL) X   Chest Exam   Respiration 18   Pulse 71   Breath Sounds   RUL Breath Sounds Clear   RML Breath Sounds Clear   RLL Breath Sounds Diminished   EVELINA Breath Sounds Clear   LLL Breath Sounds Diminished   Oximetry   #Pulse Oximetry (Single Determination) Yes   Oxygen   Home O2 Use Prior To Admission? No   Pulse Oximetry 99 %   O2 (LPM) 2   O2 Daily Delivery Respiratory  Nasal Cannula   Room Air Challenge Pass

## 2018-03-11 NOTE — DISCHARGE SUMMARY
CHIEF COMPLAINT ON ADMISSION  Chief Complaint   Patient presents with   • Chest Pain     Pt states acute onset of CP, vomiting, diaphoresis.  Pt pale and vomiting upon presentation.       CODE STATUS  Full Code    HPI & HOSPITAL COURSE  This is a 67 y.o. male with a history of polycythemia vera, coronary artery disease status post stenting in the past on Brilinta, atrial fibrillation on Eliquis, statin allergy here with dizziness. He was admitted to rule out TIA.  CTA of the head and neck revealed severe right carotid artery stenosis with 90% occlusion. He had less than 50% occlusion of the left carotid.  An MRI of the brain was performed and revealed a small to moderately sized chronic area of infarction with surrounding gliosis in the right lateral occipital and parietal occipital lobes.  Review of his previous imaging from 1/28/2017 showed that this was also present at that time. Vascular surgery was consulted to discuss the case to see if his carotid artery narrowing would be contributing to his symptoms of dizziness and also the chronic area of infarction seen on his MRI. They thought that the dizziness was most likely related to posterior circulation abnormalities and may be related to a small CVA that was not seen on MRI. No doubt the 90% occluded vessel would need to be addressed however, there are issues with stopping the patient's anticoagulation as he has had an MI in the past when Eliquis was discontinued.    The patient will follow-up with Dr. Benny Millan in his office within 48 hours of discharge. He was unable to be initiated on a statin due to history of allergy. He was continued on WelChol, Brilinta, Eliquis.    After receiving IV hydration his symptoms completely resolved. He had no focal neurologic deficits. It was presumed that possibly volume depletion may have caused him to have some transient decreased perfusion to an area of reduced blood flow to the posterior circulation. This was also  discussed with the patient detail. He'll be aware of maintaining adequate hydration to prevent future episodes.    He initially had elevated blood pressure however this normalized spontaneously and did not recur.    The patient recovered much more quickly than anticipated on admission.    Therefore, he is discharged in fair and stable condition with close outpatient follow-up.    SPECIFIC OUTPATIENT FOLLOW-UP  Follow-up with Dr. Sawyer in 48 hours to discuss right carotid stenosis  Follow-up with PCP, consider neurology referral and evaluation if symptoms recur    DISCHARGE PROBLEM LIST  Principal Problem:    Dizziness POA: Yes  Active Problems:    Nausea & vomiting POA: Yes    Polycythemia POA: Yes    Paroxysmal atrial fibrillation (CMS-[facility]) POA: Yes    Chronic systolic CHF (congestive heart failure) (CMS-HCC) POA: Yes    DM2 (diabetes mellitus, type 2) (CMS-HCC) POA: Yes    CAD (coronary artery disease) POA: Yes    History of stroke POA: Yes    Thrombocytopenia (CMS-HCC) POA: Yes    Leukopenia POA: Yes    Hypokalemia POA: Yes    Carotid stenosis, 90% right carotid POA: Yes    Dyslipidemia POA: Yes  Resolved Problems:    * No resolved hospital problems. *      FOLLOW UP  Future Appointments  Date Time Provider Department Center   5/9/2018 7:40 AM Liliana Song M.D. [facility] None   7/12/2018 7:30 AM [facility] PHARMACIST HE[facility]EN Bertrand     No follow-up provider specified.    MEDICATIONS ON DISCHARGE   Francesco Schulte   Home Medication Instructions IRAIDA:[id number]    Printed on:03/11/18 5186   Medication Information                      apixaban (ELIQUIS) 5mg Tab  Take 1 Tab by mouth 2 Times a Day.             bisoprolol (ZEBETA) 5 MG Tab  Take 2.5 mg by mouth 2 Times a Day.             colesevelam (WELCHOL) 625 MG Tab  Take 625 mg by mouth 2 times a day, with meals.             famotidine (PEPCID) 20 MG Tab  Take 1 Tab by mouth as needed (after hydrea).             finasteride (PROSCAR) 5 MG Tab  Take 5 mg by  mouth every day.             glipiZIDE (GLUCOTROL) 10 MG Tab  Take 10 mg by mouth every evening.             hydroxyurea (HYDREA) 500 MG Cap  Take 1,500 mg by mouth every evening. Alternates taking 1000 mg one day and 1500 the next day             insulin glargine (LANTUS) 100 UNIT/ML Solution  Inject 20 Units as instructed every evening.             tamsulosin (FLOMAX) 0.4 MG capsule  Take 0.4 mg by mouth every evening.             ticagrelor (BRILINTA) 90 MG Tab tablet  Take 1 Tab by mouth 2 Times a Day.                 DIET  Orders Placed This Encounter   Procedures   • Diet Order     Standing Status:   Standing     Number of Occurrences:   1     Order Specific Question:   Diet:     Answer:   Diabetic [3]       ACTIVITY  As tolerated.  Weight bearing as tolerated      CONSULTATIONS  Dr. Benny Sawyer, vascular surgery    PROCEDURES  None    LABORATORY  Lab Results   Component Value Date/Time    SODIUM 136 03/11/2018 12:55 AM    POTASSIUM 3.5 (L) 03/11/2018 12:55 AM    CHLORIDE 107 03/11/2018 12:55 AM    CO2 22 03/11/2018 12:55 AM    GLUCOSE 181 (H) 03/11/2018 12:55 AM    BUN 14 03/11/2018 12:55 AM    CREATININE 0.88 03/11/2018 12:55 AM        Lab Results   Component Value Date/Time    WBC 3.4 (L) 03/11/2018 12:55 AM    HEMOGLOBIN 12.9 (L) 03/11/2018 12:55 AM    HEMATOCRIT 37.8 (L) 03/11/2018 12:55 AM    PLATELETCT 96 (L) 03/11/2018 12:55 AM        Total time of the discharge process exceeds 40 minutes

## 2018-03-11 NOTE — PROGRESS NOTES
0700 Report received from night shift JOSLYN Dominguez. Safety precautions in place. Updated on POC, all questions answered at this time.    0740 Patient assessed. Patient complains of 6/10 pain concentrated to head and throat. Oxycodone administered per MAR. Patient transported to MRI via bed.    0830 Patient returns from MRI without complaints. Due medications given.    1640 Discharge paperwork provided to patient. Wife present at bedside. Follow up information provided, all questions answered. POC discussed. Patient escorted out of unit via wheelchair.

## 2018-03-18 LAB
EKG IMPRESSION: NORMAL
EKG IMPRESSION: NORMAL

## 2018-03-19 ENCOUNTER — RESOLUTE PROFESSIONAL BILLING HOSPITAL PROF FEE (OUTPATIENT)
Dept: HOSPITALIST | Facility: MEDICAL CENTER | Age: 68
End: 2018-03-19
Payer: MEDICARE

## 2018-03-19 ENCOUNTER — APPOINTMENT (OUTPATIENT)
Dept: ADMISSIONS | Facility: MEDICAL CENTER | Age: 68
End: 2018-03-19
Attending: SURGERY
Payer: MEDICARE

## 2018-03-19 NOTE — PROGRESS NOTES
How Severe Are Your Spot(S)?: mild Pt arrived from the ED on gurney. Pt c/o generalized weakness and 4/10 pain with head ache. Pt able to scoot to bed from [facility] however mentions he is too weak to ambulate. Pt medicated per MAR in the ED. Pt. And family members educated to unit policies and room orientation. Verbalized understanding. Fluids continued at 150 mL/hr. Telemetry monitor placed and monitored room called to verify, SR 75. Bed locked in low position with fall precautions in place and bed alarm on. Call light in place.   Have Your Spot(S) Been Treated In The Past?: has not been treated Hpi Title: Evaluation of Skin Lesions

## 2018-03-19 NOTE — PROGRESS NOTES
Patient is a future direct admit. Dr. Benny Morfin is referring and accepting this patient for Stroke. Please release all signed and held orders upon patient arrival on 3/20/18 at 1200 for Anticoagulation Therapy. Please contact Dr. Morfin for additional admission orders.

## 2018-03-20 ENCOUNTER — HOSPITAL ENCOUNTER (OUTPATIENT)
Facility: MEDICAL CENTER | Age: 68
DRG: 039 | End: 2018-03-20
Admitting: SURGERY
Payer: MEDICARE

## 2018-03-20 ENCOUNTER — HOSPITAL ENCOUNTER (INPATIENT)
Facility: MEDICAL CENTER | Age: 68
LOS: 2 days | DRG: 039 | End: 2018-03-23
Attending: SURGERY | Admitting: SURGERY
Payer: MEDICARE

## 2018-03-20 LAB
APTT PPP: 31.5 SEC (ref 24.7–36)
APTT PPP: 73.1 SEC (ref 24.7–36)
INR PPP: 1.26 (ref 0.87–1.13)
PLATELET # BLD AUTO: 200 K/UL (ref 164–446)
PROTHROMBIN TIME: 15.5 SEC (ref 12–14.6)

## 2018-03-20 PROCEDURE — 85049 AUTOMATED PLATELET COUNT: CPT

## 2018-03-20 PROCEDURE — 700102 HCHG RX REV CODE 250 W/ 637 OVERRIDE(OP): Performed by: SURGERY

## 2018-03-20 PROCEDURE — 85730 THROMBOPLASTIN TIME PARTIAL: CPT

## 2018-03-20 PROCEDURE — G0378 HOSPITAL OBSERVATION PER HR: HCPCS

## 2018-03-20 PROCEDURE — A9270 NON-COVERED ITEM OR SERVICE: HCPCS | Performed by: SURGERY

## 2018-03-20 PROCEDURE — 85610 PROTHROMBIN TIME: CPT

## 2018-03-20 PROCEDURE — 700101 HCHG RX REV CODE 250: Performed by: SURGERY

## 2018-03-20 PROCEDURE — 700111 HCHG RX REV CODE 636 W/ 250 OVERRIDE (IP): Performed by: SURGERY

## 2018-03-20 RX ORDER — HYDROXYUREA 500 MG/1
1000 CAPSULE ORAL
Status: DISCONTINUED | OUTPATIENT
Start: 2018-03-20 | End: 2018-03-23 | Stop reason: HOSPADM

## 2018-03-20 RX ORDER — SODIUM CHLORIDE AND POTASSIUM CHLORIDE 150; 900 MG/100ML; MG/100ML
INJECTION, SOLUTION INTRAVENOUS CONTINUOUS
Status: DISCONTINUED | OUTPATIENT
Start: 2018-03-20 | End: 2018-03-22

## 2018-03-20 RX ORDER — ONDANSETRON 4 MG/1
4 TABLET, ORALLY DISINTEGRATING ORAL EVERY 4 HOURS PRN
Status: DISCONTINUED | OUTPATIENT
Start: 2018-03-20 | End: 2018-03-23 | Stop reason: HOSPADM

## 2018-03-20 RX ORDER — HEPARIN SODIUM 1000 [USP'U]/ML
6000 INJECTION, SOLUTION INTRAVENOUS; SUBCUTANEOUS ONCE
Status: COMPLETED | OUTPATIENT
Start: 2018-03-20 | End: 2018-03-20

## 2018-03-20 RX ORDER — HYDROXYUREA 500 MG/1
1500 CAPSULE ORAL
Status: DISCONTINUED | OUTPATIENT
Start: 2018-03-21 | End: 2018-03-23 | Stop reason: HOSPADM

## 2018-03-20 RX ORDER — BISACODYL 10 MG
10 SUPPOSITORY, RECTAL RECTAL
Status: DISCONTINUED | OUTPATIENT
Start: 2018-03-20 | End: 2018-03-23 | Stop reason: HOSPADM

## 2018-03-20 RX ORDER — TAMSULOSIN HYDROCHLORIDE 0.4 MG/1
0.4 CAPSULE ORAL
Status: DISCONTINUED | OUTPATIENT
Start: 2018-03-20 | End: 2018-03-23 | Stop reason: HOSPADM

## 2018-03-20 RX ORDER — HEPARIN SODIUM 1000 [USP'U]/ML
3200 INJECTION, SOLUTION INTRAVENOUS; SUBCUTANEOUS PRN
Status: DISCONTINUED | OUTPATIENT
Start: 2018-03-20 | End: 2018-03-23

## 2018-03-20 RX ORDER — AMOXICILLIN 250 MG
2 CAPSULE ORAL 2 TIMES DAILY
Status: DISCONTINUED | OUTPATIENT
Start: 2018-03-20 | End: 2018-03-23 | Stop reason: HOSPADM

## 2018-03-20 RX ORDER — HYDROCODONE BITARTRATE AND ACETAMINOPHEN 10; 325 MG/1; MG/1
1 TABLET ORAL EVERY 4 HOURS PRN
Status: DISCONTINUED | OUTPATIENT
Start: 2018-03-20 | End: 2018-03-21

## 2018-03-20 RX ORDER — ONDANSETRON 2 MG/ML
4 INJECTION INTRAMUSCULAR; INTRAVENOUS EVERY 4 HOURS PRN
Status: DISCONTINUED | OUTPATIENT
Start: 2018-03-20 | End: 2018-03-22

## 2018-03-20 RX ORDER — MEPERIDINE HYDROCHLORIDE 25 MG/ML
25 INJECTION INTRAMUSCULAR; INTRAVENOUS; SUBCUTANEOUS
Status: DISCONTINUED | OUTPATIENT
Start: 2018-03-20 | End: 2018-03-23 | Stop reason: HOSPADM

## 2018-03-20 RX ORDER — POLYETHYLENE GLYCOL 3350 17 G/17G
1 POWDER, FOR SOLUTION ORAL
Status: DISCONTINUED | OUTPATIENT
Start: 2018-03-20 | End: 2018-03-23 | Stop reason: HOSPADM

## 2018-03-20 RX ORDER — HYDROCODONE BITARTRATE AND ACETAMINOPHEN 5; 325 MG/1; MG/1
1-2 TABLET ORAL EVERY 4 HOURS PRN
Status: ON HOLD | COMMUNITY
End: 2018-06-26

## 2018-03-20 RX ORDER — M-VIT,TX,IRON,MINS/CALC/FOLIC 27MG-0.4MG
1 TABLET ORAL DAILY
COMMUNITY
End: 2022-02-17

## 2018-03-20 RX ADMIN — HYDROXYUREA 1000 MG: 500 CAPSULE ORAL at 22:29

## 2018-03-20 RX ADMIN — HEPARIN SODIUM 1200 UNITS/HR: 5000 INJECTION, SOLUTION INTRAVENOUS at 16:09

## 2018-03-20 RX ADMIN — HEPARIN SODIUM 6000 UNITS: 1000 INJECTION, SOLUTION INTRAVENOUS; SUBCUTANEOUS at 16:09

## 2018-03-20 RX ADMIN — TAMSULOSIN HYDROCHLORIDE 0.4 MG: 0.4 CAPSULE ORAL at 22:30

## 2018-03-20 ASSESSMENT — PATIENT HEALTH QUESTIONNAIRE - PHQ9
SUM OF ALL RESPONSES TO PHQ QUESTIONS 1-9: 0
2. FEELING DOWN, DEPRESSED, IRRITABLE, OR HOPELESS: NOT AT ALL
1. LITTLE INTEREST OR PLEASURE IN DOING THINGS: NOT AT ALL
SUM OF ALL RESPONSES TO PHQ9 QUESTIONS 1 AND 2: 0
2. FEELING DOWN, DEPRESSED, IRRITABLE, OR HOPELESS: NOT AT ALL
SUM OF ALL RESPONSES TO PHQ QUESTIONS 1-9: 0
1. LITTLE INTEREST OR PLEASURE IN DOING THINGS: NOT AT ALL
SUM OF ALL RESPONSES TO PHQ9 QUESTIONS 1 AND 2: 0

## 2018-03-20 ASSESSMENT — LIFESTYLE VARIABLES
EVER FELT BAD OR GUILTY ABOUT YOUR DRINKING: NO
TOTAL SCORE: 0
ON A TYPICAL DAY WHEN YOU DRINK ALCOHOL HOW MANY DRINKS DO YOU HAVE: 1
TOTAL SCORE: 0
AVERAGE NUMBER OF DAYS PER WEEK YOU HAVE A DRINK CONTAINING ALCOHOL: 1
EVER_SMOKED: NEVER
TOTAL SCORE: 0
ALCOHOL_USE: YES
EVER HAD A DRINK FIRST THING IN THE MORNING TO STEADY YOUR NERVES TO GET RID OF A HANGOVER: NO
HAVE PEOPLE ANNOYED YOU BY CRITICIZING YOUR DRINKING: NO
CONSUMPTION TOTAL: NEGATIVE
HOW MANY TIMES IN THE PAST YEAR HAVE YOU HAD 5 OR MORE DRINKS IN A DAY: 0
HAVE YOU EVER FELT YOU SHOULD CUT DOWN ON YOUR DRINKING: NO

## 2018-03-20 ASSESSMENT — PAIN SCALES - GENERAL: PAINLEVEL_OUTOF10: 0

## 2018-03-20 NOTE — PROGRESS NOTES
Patient arrived to unit via wheelchair.   Ambulated to couch with steady gait.   Signed consent for treatment.   Wrist band placed.   Height, weight, and med rec completed.   Written orders signed and delivered upon arrival were placed by this RN in epic.   Admit completed.   Family at bedside.   Called for late tray.   Reports vertigo. Educated on call before fall.  Fall risk wrist band placed.  Educated on welcome packet, white board, unit policies, call before fall, TV, call light, and poc.     Dr. Morfin paged at [phone]- Dr. Morfin to put in med orders and lab orders, made aware patient is in room 415-2.

## 2018-03-20 NOTE — PROGRESS NOTES
Heparin weight based protocol initiated.   Educated patient on procedure and lab draws.    Paged Dr. Morfin to ask for pain medication - norco home med.

## 2018-03-21 PROBLEM — I65.21 STENOSIS OF RIGHT CAROTID ARTERY: Status: ACTIVE | Noted: 2018-03-21

## 2018-03-21 PROBLEM — D68.59 HYPERCOAGULABLE STATE (HCC): Status: ACTIVE | Noted: 2018-03-21

## 2018-03-21 PROBLEM — G89.29 CHRONIC HEADACHE: Status: ACTIVE | Noted: 2018-03-21

## 2018-03-21 PROBLEM — R51.9 CHRONIC HEADACHE: Status: ACTIVE | Noted: 2018-03-21

## 2018-03-21 PROBLEM — I10 ESSENTIAL HYPERTENSION: Status: ACTIVE | Noted: 2018-03-21

## 2018-03-21 LAB
ANION GAP SERPL CALC-SCNC: 5 MMOL/L (ref 0–11.9)
APTT PPP: 120.3 SEC (ref 24.7–36)
APTT PPP: 72.3 SEC (ref 24.7–36)
APTT PPP: 74 SEC (ref 24.7–36)
BUN SERPL-MCNC: 14 MG/DL (ref 8–22)
CALCIUM SERPL-MCNC: 9.6 MG/DL (ref 8.5–10.5)
CHLORIDE SERPL-SCNC: 106 MMOL/L (ref 96–112)
CO2 SERPL-SCNC: 25 MMOL/L (ref 20–33)
CREAT SERPL-MCNC: 1.09 MG/DL (ref 0.5–1.4)
ERYTHROCYTE [DISTWIDTH] IN BLOOD BY AUTOMATED COUNT: 81.8 FL (ref 35.9–50)
GLUCOSE BLD-MCNC: 112 MG/DL (ref 65–99)
GLUCOSE BLD-MCNC: 169 MG/DL (ref 65–99)
GLUCOSE BLD-MCNC: 214 MG/DL (ref 65–99)
GLUCOSE SERPL-MCNC: 88 MG/DL (ref 65–99)
HCT VFR BLD AUTO: 37.5 % (ref 42–52)
HGB BLD-MCNC: 12.2 G/DL (ref 14–18)
MCH RBC QN AUTO: 32.5 PG (ref 27–33)
MCHC RBC AUTO-ENTMCNC: 32.5 G/DL (ref 33.7–35.3)
MCV RBC AUTO: 100 FL (ref 81.4–97.8)
PLATELET # BLD AUTO: 176 K/UL (ref 164–446)
PMV BLD AUTO: 10 FL (ref 9–12.9)
POTASSIUM SERPL-SCNC: 3.8 MMOL/L (ref 3.6–5.5)
RBC # BLD AUTO: 3.75 M/UL (ref 4.7–6.1)
SODIUM SERPL-SCNC: 136 MMOL/L (ref 135–145)
WBC # BLD AUTO: 2.4 K/UL (ref 4.8–10.8)

## 2018-03-21 PROCEDURE — 160036 HCHG PACU - EA ADDL 30 MINS PHASE I: Performed by: SURGERY

## 2018-03-21 PROCEDURE — 500698 HCHG HEMOCLIP, MEDIUM: Performed by: SURGERY

## 2018-03-21 PROCEDURE — 99291 CRITICAL CARE FIRST HOUR: CPT | Performed by: SURGERY

## 2018-03-21 PROCEDURE — 500389 HCHG DRAIN, RESERVOIR SUCT JP 100CC: Performed by: SURGERY

## 2018-03-21 PROCEDURE — 110454 HCHG SHELL REV 250: Performed by: SURGERY

## 2018-03-21 PROCEDURE — 700111 HCHG RX REV CODE 636 W/ 250 OVERRIDE (IP)

## 2018-03-21 PROCEDURE — 03CH0Z6 EXTIRPATE MATTER FROM R COM CAROTID, BIFURC, OPEN: ICD-10-PCS | Performed by: SURGERY

## 2018-03-21 PROCEDURE — 501837 HCHG SUTURE CV: Performed by: SURGERY

## 2018-03-21 PROCEDURE — 03CK0ZZ EXTIRPATION OF MATTER FROM RIGHT INTERNAL CAROTID ARTERY, OPEN APPROACH: ICD-10-PCS | Performed by: SURGERY

## 2018-03-21 PROCEDURE — 700105 HCHG RX REV CODE 258: Performed by: NURSE PRACTITIONER

## 2018-03-21 PROCEDURE — A9270 NON-COVERED ITEM OR SERVICE: HCPCS | Performed by: SURGERY

## 2018-03-21 PROCEDURE — 160029 HCHG SURGERY MINUTES - 1ST 30 MINS LEVEL 4: Performed by: SURGERY

## 2018-03-21 PROCEDURE — 770022 HCHG ROOM/CARE - ICU (200)

## 2018-03-21 PROCEDURE — 160041 HCHG SURGERY MINUTES - EA ADDL 1 MIN LEVEL 4: Performed by: SURGERY

## 2018-03-21 PROCEDURE — C1768 GRAFT, VASCULAR: HCPCS | Performed by: SURGERY

## 2018-03-21 PROCEDURE — 700101 HCHG RX REV CODE 250

## 2018-03-21 PROCEDURE — 501445 HCHG STAPLER, SKIN DISP: Performed by: SURGERY

## 2018-03-21 PROCEDURE — 700102 HCHG RX REV CODE 250 W/ 637 OVERRIDE(OP): Performed by: NURSE PRACTITIONER

## 2018-03-21 PROCEDURE — 03UK0KZ SUPPLEMENT RIGHT INTERNAL CAROTID ARTERY WITH NONAUTOLOGOUS TISSUE SUBSTITUTE, OPEN APPROACH: ICD-10-PCS | Performed by: SURGERY

## 2018-03-21 PROCEDURE — 160048 HCHG OR STATISTICAL LEVEL 1-5: Performed by: SURGERY

## 2018-03-21 PROCEDURE — 700111 HCHG RX REV CODE 636 W/ 250 OVERRIDE (IP): Performed by: SURGERY

## 2018-03-21 PROCEDURE — 501838 HCHG SUTURE GENERAL: Performed by: SURGERY

## 2018-03-21 PROCEDURE — 700102 HCHG RX REV CODE 250 W/ 637 OVERRIDE(OP): Performed by: SURGERY

## 2018-03-21 PROCEDURE — 82962 GLUCOSE BLOOD TEST: CPT | Mod: 91

## 2018-03-21 PROCEDURE — 500368 HCHG DRAIN, 7MM FLAT-FLUTED: Performed by: SURGERY

## 2018-03-21 PROCEDURE — 85027 COMPLETE CBC AUTOMATED: CPT

## 2018-03-21 PROCEDURE — 160009 HCHG ANES TIME/MIN: Performed by: SURGERY

## 2018-03-21 PROCEDURE — 4A10X4G MONITORING OF CENTRAL NERVOUS ELECTRICAL ACTIVITY, INTRAOPERATIVE, EXTERNAL APPROACH: ICD-10-PCS | Performed by: SURGERY

## 2018-03-21 PROCEDURE — 160002 HCHG RECOVERY MINUTES (STAT): Performed by: SURGERY

## 2018-03-21 PROCEDURE — 36415 COLL VENOUS BLD VENIPUNCTURE: CPT

## 2018-03-21 PROCEDURE — 85730 THROMBOPLASTIN TIME PARTIAL: CPT | Mod: 91

## 2018-03-21 PROCEDURE — 95955 EEG DURING SURGERY: CPT | Performed by: SURGERY

## 2018-03-21 PROCEDURE — A9270 NON-COVERED ITEM OR SERVICE: HCPCS | Performed by: NURSE PRACTITIONER

## 2018-03-21 PROCEDURE — 95938 SOMATOSENSORY TESTING: CPT | Performed by: SURGERY

## 2018-03-21 PROCEDURE — 500257: Performed by: SURGERY

## 2018-03-21 PROCEDURE — 95940 IONM IN OPERATNG ROOM 15 MIN: CPT | Performed by: SURGERY

## 2018-03-21 PROCEDURE — A6402 STERILE GAUZE <= 16 SQ IN: HCPCS | Performed by: SURGERY

## 2018-03-21 PROCEDURE — 500700 HCHG HEMOCLIP, SMALL (RED): Performed by: SURGERY

## 2018-03-21 PROCEDURE — 80048 BASIC METABOLIC PNL TOTAL CA: CPT

## 2018-03-21 PROCEDURE — 160035 HCHG PACU - 1ST 60 MINS PHASE I: Performed by: SURGERY

## 2018-03-21 PROCEDURE — 03CM0ZZ EXTIRPATION OF MATTER FROM RIGHT EXTERNAL CAROTID ARTERY, OPEN APPROACH: ICD-10-PCS | Performed by: SURGERY

## 2018-03-21 DEVICE — PATCH .8X8CM XENOSURE BIOLOGIC VASCULAR---ORDER IN MULTIPLES OF 5---: Type: IMPLANTABLE DEVICE | Status: FUNCTIONAL

## 2018-03-21 RX ORDER — TAMSULOSIN HYDROCHLORIDE 0.4 MG/1
0.4 CAPSULE ORAL EVERY EVENING
Status: DISCONTINUED | OUTPATIENT
Start: 2018-03-21 | End: 2018-03-21

## 2018-03-21 RX ORDER — ONDANSETRON 2 MG/ML
INJECTION INTRAMUSCULAR; INTRAVENOUS
Status: COMPLETED
Start: 2018-03-21 | End: 2018-03-21

## 2018-03-21 RX ORDER — MORPHINE SULFATE 10 MG/ML
INJECTION, SOLUTION INTRAMUSCULAR; INTRAVENOUS
Status: COMPLETED
Start: 2018-03-21 | End: 2018-03-21

## 2018-03-21 RX ORDER — OXYCODONE HYDROCHLORIDE 5 MG/1
5 TABLET ORAL
Status: DISCONTINUED | OUTPATIENT
Start: 2018-03-21 | End: 2018-03-23 | Stop reason: HOSPADM

## 2018-03-21 RX ORDER — ONDANSETRON 2 MG/ML
4 INJECTION INTRAMUSCULAR; INTRAVENOUS EVERY 4 HOURS PRN
Status: DISCONTINUED | OUTPATIENT
Start: 2018-03-21 | End: 2018-03-23 | Stop reason: HOSPADM

## 2018-03-21 RX ORDER — DEXTROSE MONOHYDRATE 25 G/50ML
25 INJECTION, SOLUTION INTRAVENOUS PRN
Status: DISCONTINUED | OUTPATIENT
Start: 2018-03-21 | End: 2018-03-23 | Stop reason: HOSPADM

## 2018-03-21 RX ORDER — ACETAMINOPHEN 500 MG
1000 TABLET ORAL EVERY 6 HOURS
Status: DISCONTINUED | OUTPATIENT
Start: 2018-03-21 | End: 2018-03-23 | Stop reason: HOSPADM

## 2018-03-21 RX ORDER — HEPARIN SODIUM,PORCINE 1000/ML
VIAL (ML) INJECTION
Status: DISCONTINUED | OUTPATIENT
Start: 2018-03-21 | End: 2018-03-21

## 2018-03-21 RX ORDER — M-VIT,TX,IRON,MINS/CALC/FOLIC 27MG-0.4MG
1 TABLET ORAL DAILY
Status: DISCONTINUED | OUTPATIENT
Start: 2018-03-21 | End: 2018-03-23 | Stop reason: HOSPADM

## 2018-03-21 RX ORDER — LABETALOL HYDROCHLORIDE 5 MG/ML
5-10 INJECTION, SOLUTION INTRAVENOUS PRN
Status: DISCONTINUED | OUTPATIENT
Start: 2018-03-21 | End: 2018-03-23 | Stop reason: HOSPADM

## 2018-03-21 RX ORDER — FINASTERIDE 5 MG/1
5 TABLET, FILM COATED ORAL DAILY
Status: DISCONTINUED | OUTPATIENT
Start: 2018-03-21 | End: 2018-03-23 | Stop reason: HOSPADM

## 2018-03-21 RX ORDER — COLESEVELAM 180 1/1
625 TABLET ORAL 2 TIMES DAILY WITH MEALS
Status: DISCONTINUED | OUTPATIENT
Start: 2018-03-21 | End: 2018-03-23 | Stop reason: HOSPADM

## 2018-03-21 RX ORDER — SODIUM CHLORIDE 9 MG/ML
INJECTION, SOLUTION INTRAVENOUS CONTINUOUS
Status: DISCONTINUED | OUTPATIENT
Start: 2018-03-21 | End: 2018-03-22

## 2018-03-21 RX ORDER — HYDROXYUREA 500 MG/1
1500 CAPSULE ORAL EVERY EVENING
Status: DISCONTINUED | OUTPATIENT
Start: 2018-03-21 | End: 2018-03-21

## 2018-03-21 RX ORDER — OXYCODONE HYDROCHLORIDE 5 MG/1
2.5 TABLET ORAL
Status: DISCONTINUED | OUTPATIENT
Start: 2018-03-21 | End: 2018-03-23 | Stop reason: HOSPADM

## 2018-03-21 RX ADMIN — HYDROCODONE BITARTRATE AND ACETAMINOPHEN 1 TABLET: 10; 325 TABLET ORAL at 03:38

## 2018-03-21 RX ADMIN — ONDANSETRON HYDROCHLORIDE 4 MG: 2 INJECTION, SOLUTION INTRAMUSCULAR; INTRAVENOUS at 03:40

## 2018-03-21 RX ADMIN — HYDROMORPHONE HYDROCHLORIDE 0.5 MG: 10 INJECTION, SOLUTION INTRAMUSCULAR; INTRAVENOUS; SUBCUTANEOUS at 14:12

## 2018-03-21 RX ADMIN — MEPERIDINE HYDROCHLORIDE 25 MG: 25 INJECTION INTRAMUSCULAR; INTRAVENOUS; SUBCUTANEOUS at 17:15

## 2018-03-21 RX ADMIN — OXYCODONE HYDROCHLORIDE 5 MG: 5 TABLET ORAL at 21:06

## 2018-03-21 RX ADMIN — FENTANYL CITRATE 50 MCG: 50 INJECTION, SOLUTION INTRAMUSCULAR; INTRAVENOUS at 14:00

## 2018-03-21 RX ADMIN — HYDROXYUREA 1500 MG: 500 CAPSULE ORAL at 21:01

## 2018-03-21 RX ADMIN — MORPHINE SULFATE 5 MG: 10 INJECTION INTRAVENOUS at 14:25

## 2018-03-21 RX ADMIN — STANDARDIZED SENNA CONCENTRATE AND DOCUSATE SODIUM 2 TABLET: 8.6; 5 TABLET, FILM COATED ORAL at 21:00

## 2018-03-21 RX ADMIN — MEPERIDINE HYDROCHLORIDE 25 MG: 25 INJECTION INTRAMUSCULAR; INTRAVENOUS; SUBCUTANEOUS at 09:21

## 2018-03-21 RX ADMIN — INSULIN HUMAN 6 UNITS: 100 INJECTION, SOLUTION PARENTERAL at 20:59

## 2018-03-21 RX ADMIN — HYDROMORPHONE HYDROCHLORIDE 0.5 MG: 10 INJECTION, SOLUTION INTRAMUSCULAR; INTRAVENOUS; SUBCUTANEOUS at 14:05

## 2018-03-21 RX ADMIN — FENTANYL CITRATE 50 MCG: 50 INJECTION, SOLUTION INTRAMUSCULAR; INTRAVENOUS at 13:55

## 2018-03-21 RX ADMIN — SODIUM CHLORIDE: 9 INJECTION, SOLUTION INTRAVENOUS at 16:13

## 2018-03-21 RX ADMIN — TAMSULOSIN HYDROCHLORIDE 0.4 MG: 0.4 CAPSULE ORAL at 21:01

## 2018-03-21 RX ADMIN — ACETAMINOPHEN 1000 MG: 500 TABLET ORAL at 23:32

## 2018-03-21 RX ADMIN — INSULIN HUMAN 3 UNITS: 100 INJECTION, SOLUTION PARENTERAL at 17:19

## 2018-03-21 RX ADMIN — ONDANSETRON HYDROCHLORIDE 4 MG: 2 INJECTION, SOLUTION INTRAMUSCULAR; INTRAVENOUS at 14:45

## 2018-03-21 RX ADMIN — ONDANSETRON HYDROCHLORIDE 4 MG: 2 INJECTION, SOLUTION INTRAMUSCULAR; INTRAVENOUS at 08:07

## 2018-03-21 RX ADMIN — MORPHINE SULFATE 5 MG: 10 INJECTION INTRAVENOUS at 14:55

## 2018-03-21 RX ADMIN — BENZOCAINE AND MENTHOL 1 LOZENGE: 15; 3.6 LOZENGE ORAL at 19:54

## 2018-03-21 RX ADMIN — OXYCODONE HYDROCHLORIDE 5 MG: 5 TABLET ORAL at 23:34

## 2018-03-21 RX ADMIN — BENZOCAINE AND MENTHOL 1 LOZENGE: 15; 3.6 LOZENGE ORAL at 22:28

## 2018-03-21 ASSESSMENT — PAIN SCALES - GENERAL
PAINLEVEL_OUTOF10: 8
PAINLEVEL_OUTOF10: 8
PAINLEVEL_OUTOF10: 10
PAINLEVEL_OUTOF10: 4
PAINLEVEL_OUTOF10: 7
PAINLEVEL_OUTOF10: 6
PAINLEVEL_OUTOF10: 7
PAINLEVEL_OUTOF10: 8
PAINLEVEL_OUTOF10: 4
PAINLEVEL_OUTOF10: 6
PAINLEVEL_OUTOF10: 10
PAINLEVEL_OUTOF10: 10
PAINLEVEL_OUTOF10: 4
PAINLEVEL_OUTOF10: 7
PAINLEVEL_OUTOF10: 3

## 2018-03-21 ASSESSMENT — COPD QUESTIONNAIRES
DURING THE PAST 4 WEEKS HOW MUCH DID YOU FEEL SHORT OF BREATH: NONE/LITTLE OF THE TIME
DO YOU EVER COUGH UP ANY MUCUS OR PHLEGM?: NO/ONLY WITH OCCASIONAL COLDS OR INFECTIONS
HAVE YOU SMOKED AT LEAST 100 CIGARETTES IN YOUR ENTIRE LIFE: NO/DON'T KNOW
COPD SCREENING SCORE: 2

## 2018-03-21 ASSESSMENT — LIFESTYLE VARIABLES: EVER_SMOKED: NEVER

## 2018-03-21 NOTE — CARE PLAN
Problem: Safety  Goal: Will remain free from falls  Outcome: PROGRESSING AS EXPECTED  Pt will remain fall free during hospital stay.

## 2018-03-21 NOTE — CARE PLAN
Problem: Safety  Goal: Will remain free from injury  Outcome: PROGRESSING AS EXPECTED  Patient is following safety instructions.  Patient is calling for assistance as appropriate.   Educated on call before fall.    Problem: Knowledge Deficit  Goal: Knowledge of disease process/condition, treatment plan, diagnostic tests, and medications will improve  Outcome: PROGRESSING AS EXPECTED  Educated on poc and process of surgery including pre op etc.  Verbalizes understanding.

## 2018-03-21 NOTE — OR NURSING
BP within target.  Patient with initial c/o severe headache, now states some relief - see MAR for meds admin.  Oximetry WNL on 2 LPM.  Neuro intact.Right carotid dsg CDI with soft surrounding tissue. BREE with minimal output.  Prepped for tx to ICU

## 2018-03-21 NOTE — OP REPORT
Francesco [place]     03/21/18    Pre-operative Diagnosis - Right Carotid Artery Stenosis, Polycythemia Vera    Post-operative Diagnosis - Same    Procedure Performed - Right Carotid Endarterectomy     Anesthesiologist - Abel    Surgeon - Benny Morfin M.D.    First Assistant - FELIX Lennon    Anesthesia- General with EEG monitoring     Indication - 67 y.o.    Procedure in Detail - the patient was taken to the operating suite placed in supine position after adequate general endotracheal intubation the patient's right neck was prepped and draped in sterile fashion.     Marcaine with epinephrine was infiltrated into the subcutaneous tissue along the anterior border of the sternomastoid muscle. Incision was made along the anterior border incision was carried down to the platysmal layer. The dissection took place along the anterior aspect of the cerebrovascular muscle retracting the muscle laterally. The jugular vein was identified and dissection took place along the anterior aspect of the jugular vein with multiple tributaries being ligated between ties and hemoclips. The vein was retracted laterally exposing the common carotid artery common carotid internal carotid and external carotid were all circumferentially dissected and isolated with Vesseloops. Great care was taken to preserve all cranial nerve structures including the hypoglossal nerve and the vagus nerve. Once all vessels were carefully dissected and encircled with Vesseloops. The patient was admitted preoperatively for anticoagulation the patient was on a heparin drip. Heparin drip was continued during the entire course of the operation with a 2000 unit bolus prior to clamping the artery.  After appropriate. The vessels were sequentially clamped. There was an immediate change in the EEG with decreased signals in the right hemisphere after carotid clamping. A shunt was rapidly place with resumption of normal EEG signal. EEG change at the  time of carotid crossclamping. A longitudinal arteriotomy was made in the common carotid artery and carried up onto the internal carotid artery using Bauer scissors. After opening the vessel and irrigating a standard endarterectomy was performed of the bifurcation distal common carotid and proximal internal carotid artery. An eversion technique was used on the external carotid. After the endarterectomy was completed the distal endpoint was tacked using several 6-0 Prolene sutures. When the areas free of all atheromatous debris the bovine patch was selected and sewn in circumferentially using 5-0 Prolene sutures. The shunt was retrieved and Just prior to completing the patch all vessels were vigorously for 1st and back flushed the patch was completed and flow was established 1st to the external and subsequently to the internal carotid artery. Protamine was not administered and the heparin drip was continued throughout the course of the operation and will be continued postoperatively. hemostasis was complete. A 10 flat fluted Patrick-Ellison drain was left within the neck (separate stab incision and sutured to the skin. 2-0 Vicryl interrupted sutures were placed platysmal layer followed by 40 strut affixed to reapproximate the skin. Sterile dressings were applied and the patient was taken to the recovery room.      Benny Morfin M.D.

## 2018-03-21 NOTE — PROGRESS NOTES
Report given to pre op RN.   Patient to be sent down on heparin drip per Morfin order.   Heparin drip running with 2 hours and 33 minutes left on bag.   Family down with patient, educated on process of pre op, surgery time, and post op time frame.    Patient reports positive effect from demerol for headache.   FSBS 112 prior to transport.   Pre op checklist filled out prior to transport.   Surgical consent signed, anesthesia printed and on chart.   [facility] bath received this AM.    Down to pre op with Stella transport and family.

## 2018-03-21 NOTE — PROGRESS NOTES
"1515:  Patient transferred from PACU to S126 via bed accompanied by Jany JORGENSEN.  Bedside report received.  Patient c/o \"terrible headache\", nausea and dizziness.  Patient states headache is worse than pre-op.  Room dark, decreased stimuli.  Will continue to monitor.      1600:  Discussed patient's headache and c/o nausea with Dr. Jennings.   "

## 2018-03-21 NOTE — PROGRESS NOTES
Report received from night RN, assumed Care.   Patient is AOx4, responds appropriately.      Excruciating headache at this time, awaiting IV demerol delivery to administer to patient- educated on delayed administration, patient resting with heat pack and darkened room.  Medicated with zofran with positive effect.    IV with heparin drip in place through PIV, rate verified.  Refused a start of second PIV overnight despite education for IVF administration.  Patient is npo at this time.   +void in urinal.  Declines numbness/tingling, sensation intact to all extremities.     Plan of care discussed with patient and family, all questions answered.    Explained importance of calling before getting OOB and pt verbalizes understanding.       Call light and belongings within reach, treaded slipper socks on, SCD in use, bed in lowest locked position.  Hourly rounding in place, all needs met at this time

## 2018-03-22 PROBLEM — E11.9 DIABETES (HCC): Status: ACTIVE | Noted: 2017-03-12

## 2018-03-22 LAB
APTT PPP: 59.4 SEC (ref 24.7–36)
GLUCOSE BLD-MCNC: 148 MG/DL (ref 65–99)
GLUCOSE BLD-MCNC: 170 MG/DL (ref 65–99)
GLUCOSE BLD-MCNC: 197 MG/DL (ref 65–99)
GLUCOSE BLD-MCNC: 201 MG/DL (ref 65–99)

## 2018-03-22 PROCEDURE — 99233 SBSQ HOSP IP/OBS HIGH 50: CPT | Performed by: SURGERY

## 2018-03-22 PROCEDURE — 700102 HCHG RX REV CODE 250 W/ 637 OVERRIDE(OP): Performed by: NURSE PRACTITIONER

## 2018-03-22 PROCEDURE — 700102 HCHG RX REV CODE 250 W/ 637 OVERRIDE(OP): Performed by: SURGERY

## 2018-03-22 PROCEDURE — 770001 HCHG ROOM/CARE - MED/SURG/GYN PRIV*

## 2018-03-22 PROCEDURE — A9270 NON-COVERED ITEM OR SERVICE: HCPCS | Performed by: NURSE PRACTITIONER

## 2018-03-22 PROCEDURE — 85730 THROMBOPLASTIN TIME PARTIAL: CPT

## 2018-03-22 PROCEDURE — 700105 HCHG RX REV CODE 258: Performed by: NURSE PRACTITIONER

## 2018-03-22 PROCEDURE — 302126 SENSORMAT,CHAIR: Performed by: SURGERY

## 2018-03-22 PROCEDURE — 700111 HCHG RX REV CODE 636 W/ 250 OVERRIDE (IP): Performed by: SURGERY

## 2018-03-22 PROCEDURE — 82962 GLUCOSE BLOOD TEST: CPT | Mod: 91

## 2018-03-22 PROCEDURE — A9270 NON-COVERED ITEM OR SERVICE: HCPCS | Performed by: SURGERY

## 2018-03-22 RX ORDER — HYDRALAZINE HYDROCHLORIDE 20 MG/ML
20 INJECTION INTRAMUSCULAR; INTRAVENOUS EVERY 6 HOURS PRN
Status: DISCONTINUED | OUTPATIENT
Start: 2018-03-22 | End: 2018-03-23 | Stop reason: HOSPADM

## 2018-03-22 RX ADMIN — ACETAMINOPHEN 1000 MG: 500 TABLET ORAL at 18:11

## 2018-03-22 RX ADMIN — OXYCODONE HYDROCHLORIDE 5 MG: 5 TABLET ORAL at 07:31

## 2018-03-22 RX ADMIN — STANDARDIZED SENNA CONCENTRATE AND DOCUSATE SODIUM 2 TABLET: 8.6; 5 TABLET, FILM COATED ORAL at 21:53

## 2018-03-22 RX ADMIN — COLESEVELAM HYDROCHLORIDE 625 MG: 625 TABLET, FILM COATED ORAL at 07:31

## 2018-03-22 RX ADMIN — ACETAMINOPHEN 1000 MG: 500 TABLET ORAL at 23:24

## 2018-03-22 RX ADMIN — HEPARIN SODIUM 1050 UNITS/HR: 5000 INJECTION, SOLUTION INTRAVENOUS at 12:07

## 2018-03-22 RX ADMIN — MULTIPLE VITAMINS W/ MINERALS TAB 1 TABLET: TAB at 07:31

## 2018-03-22 RX ADMIN — OXYCODONE HYDROCHLORIDE 5 MG: 5 TABLET ORAL at 10:41

## 2018-03-22 RX ADMIN — ACETAMINOPHEN 1000 MG: 500 TABLET ORAL at 05:14

## 2018-03-22 RX ADMIN — ACETAMINOPHEN 1000 MG: 500 TABLET ORAL at 12:03

## 2018-03-22 RX ADMIN — BENZOCAINE AND MENTHOL 1 LOZENGE: 15; 3.6 LOZENGE ORAL at 15:16

## 2018-03-22 RX ADMIN — SODIUM CHLORIDE: 9 INJECTION, SOLUTION INTRAVENOUS at 03:08

## 2018-03-22 RX ADMIN — COLESEVELAM HYDROCHLORIDE 625 MG: 625 TABLET, FILM COATED ORAL at 18:11

## 2018-03-22 RX ADMIN — INSULIN HUMAN 6 UNITS: 100 INJECTION, SOLUTION PARENTERAL at 12:06

## 2018-03-22 RX ADMIN — FINASTERIDE 5 MG: 5 TABLET, FILM COATED ORAL at 07:31

## 2018-03-22 RX ADMIN — HYDROXYUREA 1000 MG: 500 CAPSULE ORAL at 23:02

## 2018-03-22 RX ADMIN — BENZOCAINE AND MENTHOL 1 LOZENGE: 15; 3.6 LOZENGE ORAL at 00:00

## 2018-03-22 RX ADMIN — INSULIN HUMAN 3 UNITS: 100 INJECTION, SOLUTION PARENTERAL at 21:59

## 2018-03-22 RX ADMIN — OXYCODONE HYDROCHLORIDE 5 MG: 5 TABLET ORAL at 14:18

## 2018-03-22 RX ADMIN — STANDARDIZED SENNA CONCENTRATE AND DOCUSATE SODIUM 2 TABLET: 8.6; 5 TABLET, FILM COATED ORAL at 07:31

## 2018-03-22 RX ADMIN — MEPERIDINE HYDROCHLORIDE 25 MG: 25 INJECTION INTRAMUSCULAR; INTRAVENOUS; SUBCUTANEOUS at 22:58

## 2018-03-22 RX ADMIN — OXYCODONE HYDROCHLORIDE 5 MG: 5 TABLET ORAL at 03:06

## 2018-03-22 RX ADMIN — OXYCODONE HYDROCHLORIDE 5 MG: 5 TABLET ORAL at 21:54

## 2018-03-22 RX ADMIN — INSULIN HUMAN 3 UNITS: 100 INJECTION, SOLUTION PARENTERAL at 07:33

## 2018-03-22 RX ADMIN — OXYCODONE HYDROCHLORIDE 5 MG: 5 TABLET ORAL at 18:11

## 2018-03-22 RX ADMIN — TAMSULOSIN HYDROCHLORIDE 0.4 MG: 0.4 CAPSULE ORAL at 18:11

## 2018-03-22 ASSESSMENT — ENCOUNTER SYMPTOMS
DOUBLE VISION: 0
TINGLING: 0
ABDOMINAL PAIN: 0
VOMITING: 0
HEADACHES: 1
DIZZINESS: 0
SHORTNESS OF BREATH: 0
CHILLS: 0
MYALGIAS: 0
NAUSEA: 0
FEVER: 0
COUGH: 0
SPEECH CHANGE: 0
SENSORY CHANGE: 0

## 2018-03-22 ASSESSMENT — PAIN SCALES - GENERAL
PAINLEVEL_OUTOF10: 5
PAINLEVEL_OUTOF10: 4
PAINLEVEL_OUTOF10: 8
PAINLEVEL_OUTOF10: 7
PAINLEVEL_OUTOF10: 3
PAINLEVEL_OUTOF10: 4
PAINLEVEL_OUTOF10: 3
PAINLEVEL_OUTOF10: 4
PAINLEVEL_OUTOF10: 2
PAINLEVEL_OUTOF10: 5
PAINLEVEL_OUTOF10: 2
PAINLEVEL_OUTOF10: 4
PAINLEVEL_OUTOF10: 8
PAINLEVEL_OUTOF10: 6
PAINLEVEL_OUTOF10: 8

## 2018-03-22 NOTE — PROGRESS NOTES
from Lab called with critical result of .3 at 1711. Critical lab result read back to .   This critical lab result is within parameters established by  (heparin weight based protocol) for this patient

## 2018-03-22 NOTE — PROGRESS NOTES
Report called to [facility] RN.  Plan to transfer to [facility]-, awaiting transport.

## 2018-03-22 NOTE — CARE PLAN
Problem: Pain  Goal: Alleviation of Pain or a reduction in pain to the patient's comfort goal    Intervention: Pain Management--Medications  Patient c/o headache 8/10.  PRN Demerol administered.       Problem: Hemodynamic Status  Goal: Vital Signs and Fluid Balance Management    Intervention: Hemodynamic Monitoring  Art line in place.  Goal SBP <140.  No interventions required at this time.

## 2018-03-22 NOTE — PROGRESS NOTES
Patient stood and transferred into wheelchair.  Transferred to [facility] accompanied by transport.  Patient's family packed up personal belongings including cell phone, glasses and a bag of additional belongings.

## 2018-03-22 NOTE — PROGRESS NOTES
"  Trauma/Surgical Progress Note    Author: Iqra Baez Date & Time created: 3/22/2018   10:10 AM     Interval Events:  POD #1 right carotid endarterectomy  Pain control adequate  Tolerating diet  Heparin drip to continue per Dr. Morfin recommendations  Clinically stable to transfer to [facility] at this time    Review of Systems   Constitutional: Negative for chills and fever.   Eyes: Negative for double vision.   Respiratory: Negative for cough and shortness of breath.    Cardiovascular: Negative for chest pain.   Gastrointestinal: Negative for abdominal pain, nausea and vomiting.   Genitourinary:        Voiding   Musculoskeletal: Negative for myalgias.   Skin: Negative for rash.   Neurological: Positive for headaches (improving). Negative for dizziness, tingling, sensory change and speech change.     Hemodynamics:  Blood pressure 120/71, pulse (!) 49, temperature 35.9 °C (96.7 °F), resp. rate (!) 11, height 1.84 m (6' 0.44\"), weight 99.5 kg (219 lb 5.7 oz), SpO2 95 %.     Respiratory:    Respiration: (!) 11, Pulse Oximetry: 95 %, O2 Daily Delivery Respiratory : Nasal Cannula     Work Of Breathing / Effort: Mild  RUL Breath Sounds: Clear, RML Breath Sounds: Clear, RLL Breath Sounds: Diminished, EVELINA Breath Sounds: Clear, LLL Breath Sounds: Diminished  Fluids:    Intake/Output Summary (Last 24 hours) at 03/22/18 1010  Last data filed at 03/22/18 0740   Gross per 24 hour   Intake             3942 ml   Output             2210 ml   Net             1732 ml     Admit Weight: 99.5 kg (219 lb 5.7 oz)  Current      Physical Exam   Constitutional: He is oriented to person, place, and time. He appears well-developed. No distress.   Up in chair   HENT:   Head: Normocephalic.   Eyes: Conjunctivae are normal.   Neck:   Right neck dressings intact.   BREE to self suction with serosanguineous drainage   Cardiovascular: Regular rhythm.    Bradycardia    Pulmonary/Chest: Effort normal. No respiratory distress.   Abdominal: Soft. He " exhibits no distension.   Musculoskeletal: He exhibits no edema.   Moves all extremities    Neurological: He is alert and oriented to person, place, and time.   Skin: Skin is warm and dry.   Psychiatric: He has a normal mood and affect.   Nursing note and vitals reviewed.      Medical Decision Making/Problem List:    Active Hospital Problems    Diagnosis   • Stenosis of right carotid artery [I65.21]     Priority: High     90% right carotid stenosis  Right CEA 3/21  Benny Morfin MD - vascular surgery     • Essential hypertension [I10]     Priority: High     Strict perioperative blood pressure control, goal systolic 100-140     • Hypercoagulable state (CMS-HCC) [D68.59]     Priority: High     Polycythemia vera, takes outpatient Brillinta and Xarelto for h/o MI  Heparin drip perioperatively per Dr. Morfin.       • Chronic headache [R51]     Priority: Medium     Monitor for cerebral hyperperfusion     • DM2 (diabetes mellitus, type 2) (CMS-HCC) [E11.9]     Priority: Medium     Chronic condition treated with Glipizide and Lantus.  Sliding scale insulin during acute hospitalization.         Core Measures & Quality Metrics:  Labs reviewed and Medications reviewed  Andres catheter: No Andres      DVT Prophylaxis: Heparin  DVT prophylaxis - mechanical: SCDs  Ulcer prophylaxis: Not indicated        Total Score: 0    Discussed patient condition with RN, Patient and general surgery, Dr. Jennings.    Patient seen, data reviewed and discussed.  Agree with assessment and plan.   Headache much improved, nausea much improved. BP a little high but manageable with prns.   D/c art line. BREE per Dr. Morfin. Transfer to [facility].    Critical care time: 38 minutes.     Francesco Jennings MD  [phone]

## 2018-03-22 NOTE — PROGRESS NOTES
Pt's bony prominences checked, slight blanchable redness over the buttocks bilaterally.  No abnormalities over the heels, elbows, or the back of the ears bilaterally.

## 2018-03-22 NOTE — DISCHARGE PLANNING
Current documentation reveals a potential for acute inpatient rehabilitation, pending [place] evals.  Please consider a PMR referral to assist with discharge planning.

## 2018-03-22 NOTE — PROGRESS NOTES
"  Trauma/Surgical Progress Note    Author: Francesco Jennings Date & Time created: 3/21/2018   6:05 PM     Interval Events:  Right CEA today, no major complications  Heparin drip perioperatively due to polycythemic state  Complains of headache and nausea, both of which are chronic conditions.    Hemodynamics:  Blood pressure 120/71, pulse (!) 56, temperature (!) 35.8 °C (96.4 °F), resp. rate 18, height 1.84 m (6' 0.44\"), weight 99.5 kg (219 lb 5.7 oz), SpO2 99 %.     Respiratory:    Respiration: 18, Pulse Oximetry: 99 %, O2 Daily Delivery Respiratory : Nasal Cannula     Work Of Breathing / Effort: Mild  RUL Breath Sounds: Clear, RML Breath Sounds: Clear;Diminished, RLL Breath Sounds: Clear;Diminished, EVELINA Breath Sounds: Clear, LLL Breath Sounds: Clear;Diminished  Fluids:    Intake/Output Summary (Last 24 hours) at 03/21/18 1805  Last data filed at 03/21/18 1600   Gross per 24 hour   Intake             1889 ml   Output             1525 ml   Net              364 ml     Admit Weight: 99.5 kg (219 lb 5.7 oz)  Current      Physical Exam   Constitutional: He is oriented to person, place, and time. He appears well-developed and well-nourished. No distress.   HENT:   Head: Normocephalic and atraumatic.   Eyes: EOM are normal. Pupils are equal, round, and reactive to light.   Neck:   Right neck dressings intact. BREE serosang.    Cardiovascular: Normal rate and regular rhythm.    Pulmonary/Chest: Effort normal and breath sounds normal.   Abdominal: Soft. He exhibits no distension.   Genitourinary: Penis normal.   Musculoskeletal: Normal range of motion. He exhibits no edema.   Neurological: He is alert and oriented to person, place, and time.   Skin: Skin is warm and dry.   Psychiatric: He has a normal mood and affect. His behavior is normal.   Nursing note and vitals reviewed.      Medical Decision Making/Problem List:    Active Hospital Problems    Diagnosis   • Stenosis of right carotid artery [I65.21]     Priority: High    "  90% right carotid stenosis  Right CEA 3/21  Benny Morfin MD - vascular surgery     • Essential hypertension [I10]     Priority: High     Strict perioperative blood pressure control, goal systolic 120 - 130     • Hypercoagulable state (CMS-HCC) [D68.59]     Priority: High     Polycythemia vera, takes outpatient Brillinta and Xarelto for h/o MI  Heparin drip perioperatively per Dr. Morfin.       • Chronic headache [R51]     Priority: Medium     Monitor for cerebral hyperperfusion       Core Measures & Quality Metrics:  Labs reviewed, Medications reviewed and Radiology images reviewed  Andres catheter: No Andres      DVT Prophylaxis: Heparin  DVT prophylaxis - mechanical: SCDs  Ulcer prophylaxis: Not indicated      Plan:  Careful perioperative blood pressure control as outlined above  Careful monitoring for cerebral hyperperfusion  Add compazine to zofran    SON Score  Discussed patient condition with RN, RT and Pharmacy. And Dr. Morfin post operatively.  The patient is/remains critically ill with recent carotid surgery, hypercoagulable state with perioperative heparin.    I provided the following critical care services: management of high risk medications.    Critical care time spent exclusive of procedures: 45 minutes.    Francesco Jennings MD  [phone]

## 2018-03-22 NOTE — PROGRESS NOTES
Vascular Surgery     Awake alert  Reports resolution of dizziness  Incision Ok   No focal neuro deficits     Rec- Transfer to floor  Keep BREE drain today   Continue heparin gtt    Plan - Restart oral anticoagulants and anti-platelet agents tomorrow     Benny Morfin MD

## 2018-03-22 NOTE — DISCHARGE PLANNING
Medical Social Work    SW attended Trauma ICU rounds this am. Pt is 67 year old Bob Schulte who was brought to [facility] due to concerns of stroke. Pt was sitting up in bed during rounds and able to converse with medical team. Pt thankful for care at [facility]. Plan is for pt to transfer from ICU to [facility] over the next day. No immediate d/c planning needs known.    SW to remain available for any needs.

## 2018-03-23 VITALS
HEART RATE: 61 BPM | WEIGHT: 219.36 LBS | BODY MASS INDEX: 29.71 KG/M2 | OXYGEN SATURATION: 96 % | TEMPERATURE: 97.5 F | HEIGHT: 72 IN | RESPIRATION RATE: 18 BRPM | DIASTOLIC BLOOD PRESSURE: 83 MMHG | SYSTOLIC BLOOD PRESSURE: 153 MMHG

## 2018-03-23 LAB
APTT PPP: 47.2 SEC (ref 24.7–36)
GLUCOSE BLD-MCNC: 147 MG/DL (ref 65–99)
GLUCOSE BLD-MCNC: 219 MG/DL (ref 65–99)

## 2018-03-23 PROCEDURE — 36415 COLL VENOUS BLD VENIPUNCTURE: CPT

## 2018-03-23 PROCEDURE — 700111 HCHG RX REV CODE 636 W/ 250 OVERRIDE (IP): Performed by: SURGERY

## 2018-03-23 PROCEDURE — 700102 HCHG RX REV CODE 250 W/ 637 OVERRIDE(OP): Performed by: SURGERY

## 2018-03-23 PROCEDURE — A9270 NON-COVERED ITEM OR SERVICE: HCPCS | Performed by: SURGERY

## 2018-03-23 PROCEDURE — A9270 NON-COVERED ITEM OR SERVICE: HCPCS | Performed by: NURSE PRACTITIONER

## 2018-03-23 PROCEDURE — 700102 HCHG RX REV CODE 250 W/ 637 OVERRIDE(OP): Performed by: NURSE PRACTITIONER

## 2018-03-23 PROCEDURE — 85730 THROMBOPLASTIN TIME PARTIAL: CPT

## 2018-03-23 PROCEDURE — 82962 GLUCOSE BLOOD TEST: CPT | Mod: 91

## 2018-03-23 RX ADMIN — MEPERIDINE HYDROCHLORIDE 25 MG: 25 INJECTION INTRAMUSCULAR; INTRAVENOUS; SUBCUTANEOUS at 03:34

## 2018-03-23 RX ADMIN — MULTIPLE VITAMINS W/ MINERALS TAB 1 TABLET: TAB at 09:00

## 2018-03-23 RX ADMIN — TICAGRELOR 90 MG: 90 TABLET ORAL at 09:02

## 2018-03-23 RX ADMIN — FINASTERIDE 5 MG: 5 TABLET, FILM COATED ORAL at 09:00

## 2018-03-23 RX ADMIN — ACETAMINOPHEN 1000 MG: 500 TABLET ORAL at 05:09

## 2018-03-23 RX ADMIN — COLESEVELAM HYDROCHLORIDE 625 MG: 625 TABLET, FILM COATED ORAL at 11:54

## 2018-03-23 RX ADMIN — HEPARIN SODIUM 3200 UNITS: 1000 INJECTION, SOLUTION INTRAVENOUS; SUBCUTANEOUS at 05:03

## 2018-03-23 RX ADMIN — OXYCODONE HYDROCHLORIDE 5 MG: 5 TABLET ORAL at 13:50

## 2018-03-23 RX ADMIN — MEPERIDINE HYDROCHLORIDE 25 MG: 25 INJECTION INTRAMUSCULAR; INTRAVENOUS; SUBCUTANEOUS at 07:40

## 2018-03-23 RX ADMIN — INSULIN HUMAN 6 UNITS: 100 INJECTION, SOLUTION PARENTERAL at 12:16

## 2018-03-23 RX ADMIN — OXYCODONE HYDROCHLORIDE 5 MG: 5 TABLET ORAL at 01:14

## 2018-03-23 RX ADMIN — ACETAMINOPHEN 1000 MG: 500 TABLET ORAL at 13:49

## 2018-03-23 RX ADMIN — OXYCODONE HYDROCHLORIDE 5 MG: 5 TABLET ORAL at 05:09

## 2018-03-23 RX ADMIN — STANDARDIZED SENNA CONCENTRATE AND DOCUSATE SODIUM 2 TABLET: 8.6; 5 TABLET, FILM COATED ORAL at 08:59

## 2018-03-23 RX ADMIN — APIXABAN 5 MG: 5 TABLET, FILM COATED ORAL at 09:00

## 2018-03-23 RX ADMIN — ONDANSETRON 4 MG: 4 TABLET, ORALLY DISINTEGRATING ORAL at 09:46

## 2018-03-23 ASSESSMENT — PAIN SCALES - GENERAL
PAINLEVEL_OUTOF10: 8
PAINLEVEL_OUTOF10: 4
PAINLEVEL_OUTOF10: 4
PAINLEVEL_OUTOF10: 7
PAINLEVEL_OUTOF10: 7

## 2018-03-23 NOTE — PROGRESS NOTES
Pt was given all discharge instructions and prescriptions prior to leaving the hospital. Medicated for pain prior to leaving the hospital. PT reported adequate pain control with PRN PO pain medication. PT verbally indicated understanding all RN and MD instructions.

## 2018-03-23 NOTE — PROGRESS NOTES
Morning aptt 47.2 - increased heparin gtt to 1200 units/hr, given rebolus of 3200 units per protocol.  Next aptt due at 1100.

## 2018-03-23 NOTE — PROGRESS NOTES
Bedside report completed.  A&O x4. In no acute distress.  Neurologically intact, no deficits noted.  98.0F, /60, RR18, HR 65, SpO2 94% on RA.    Heparin infusing at 1050 units/hr, therapeutic aptt per protocol.  Next aptt 3/23/18 at 0330.  Ambulating with stand-by assistance, steady gait.  Tolerating diabetic diet, no s/s aspirations/dysphagia.  C/o 8/10 pain, medicated per MAR.  Right neck surgical incision with dressing, CDI.  BREE with serosanguineous output.  Last BM PTA, given stool softeners.  + void.  Call light and personal belongings within reach.  POC discussed and all questions answered.  SCDs in place.  No additional needs at this time.

## 2018-03-23 NOTE — PROGRESS NOTES
Assumed care of pt at 1515.    Pt is A&O x4.  Pain 5/10  Denies any nausea  Tolerating Diabetic diet  Surgical BREE drain is putting out serosanguinous fluid   Adequate Urine output  Negative BM Void   Negative Flatus  Up Standby Assist  SCD's are one  Family in room and appropriate  Bed in lowest position and locked.  Bed alarm.  Pt resting comfortably now.  Review plan of care with patient  Call light within reach  Hourly rounds in place  All needs met at this time

## 2018-03-23 NOTE — DISCHARGE SUMMARY
CHIEF COMPLAINT ON ADMISSION  Right carotid artery stenosis     CODE STATUS  Full Code    HPI & HOSPITAL COURSE  This is a 67 y.o. male here with Right CHINMAY, Was admiitted day prior for anticoagulation     The patient recovered much more quickly than anticipated on admission.    Therefore, he is discharged in good and stable condition with close outpatient follow-up.    SPECIFIC OUTPATIENT FOLLOW-UP  5 days my office     DISCHARGE PROBLEM LIST  Active Problems:    Stenosis of right carotid artery POA: Yes      Overview: 90% right carotid stenosis      Right CEA 3/21      Benny Morfin MD - vascular surgery    Essential hypertension POA: Yes      Overview: Strict perioperative blood pressure control, goal systolic 100-140    Hypercoagulable state (CMS-HCC) POA: Yes      Overview: Polycythemia vera, takes outpatient Brillinta and Xarelto for h/o MI      Heparin drip perioperatively per Dr. Morfin.          DM2 (diabetes mellitus, type 2) (CMS-HCC) POA: Yes      Overview: Chronic condition treated with Glipizide and Lantus.      Sliding scale insulin during acute hospitalization.          Chronic headache POA: Yes      Overview: Monitor for cerebral hyperperfusion  Resolved Problems:    * No resolved hospital problems. *      FOLLOW UP  Future Appointments  Date Time Provider Department Center   5/9/2018 7:40 AM Liliana Song M.D. [facility] None   7/12/2018 7:30 AM [facility] PHARMACIST BRINDA Morfin M.D.  [address]  [address]  [facility] [address]  [phone]    In 5 days        MEDICATIONS ON DISCHARGE   Francesco Schulte   Home Medication Instructions IRAIDA:[id number]    Printed on:03/23/18 0866   Medication Information                      apixaban (ELIQUIS) 5mg Tab  Take 1 Tab by mouth 2 Times a Day.             colesevelam (WELCHOL) 625 MG Tab  Take 625 mg by mouth 2 times a day, with meals.             finasteride (PROSCAR) 5 MG Tab  Take 5 mg by mouth every day.             glipiZIDE  (GLUCOTROL) 10 MG Tab  Take 10 mg by mouth every day.             HYDROcodone-acetaminophen (NORCO) 5-325 MG Tab per tablet  Take 1-2 Tabs by mouth every four hours as needed.             hydroxyurea (HYDREA) 500 MG Cap  Take 1,500 mg by mouth every evening. Alternates taking 1000 mg one day and 1500 the next day             insulin glargine (LANTUS) 100 UNIT/ML Solution  Inject 20 Units as instructed every evening.             tamsulosin (FLOMAX) 0.4 MG capsule  Take 0.4 mg by mouth every evening.             therapeutic multivitamin-minerals (THERAGRAN-M) Tab  Take 1 Tab by mouth every day.             ticagrelor (BRILINTA) 90 MG Tab tablet  Take 1 Tab by mouth 2 Times a Day.                 DIET  Orders Placed This Encounter   Procedures   • DIET ORDER     Standing Status:   Standing     Number of Occurrences:   1     Order Specific Question:   Diet:     Answer:   Diabetic [3]       ACTIVITY  As tolerated.  Weight bearing as tolerated      CONSULTATIONS  none    PROCEDURES  Right carotid endarterectomy    LABORATORY  Lab Results   Component Value Date/Time    SODIUM 136 03/21/2018 03:32 AM    POTASSIUM 3.8 03/21/2018 03:32 AM    CHLORIDE 106 03/21/2018 03:32 AM    CO2 25 03/21/2018 03:32 AM    GLUCOSE 88 03/21/2018 03:32 AM    BUN 14 03/21/2018 03:32 AM    CREATININE 1.09 03/21/2018 03:32 AM        Lab Results   Component Value Date/Time    WBC 2.4 (LL) 03/21/2018 03:32 AM    HEMOGLOBIN 12.2 (L) 03/21/2018 03:32 AM    HEMATOCRIT 37.5 (L) 03/21/2018 03:32 AM    PLATELETCT 176 03/21/2018 03:32 AM        Total time of the discharge process exceeds 31 minutes

## 2018-03-23 NOTE — DISCHARGE INSTRUCTIONS
Discharge Instructions    Carotid Endarterectomy, Care After  Refer to this sheet in the next few weeks. These instructions provide you with information on caring for yourself after your procedure. Your health care provider may also give you more specific instructions. Your treatment has been planned according to current medical practices, but problems sometimes occur. Call your health care provider if you have any problems or questions after your procedure.   WHAT TO EXPECT AFTER THE PROCEDURE  You may have some pain or an ache in your neck for up to 2 weeks. This is normal. Recovery time varies depending on your age, condition, general health, and other factors. You will likely be able to return to a normal lifestyle within a few weeks.   HOME CARE INSTRUCTIONS   · Take showers if your health care provider approves. Do not take baths, swim, or use a hot tub until your health care provider approves.    · Take medicines only as directed by your health care provider. If a blood thinner (anticoagulant) is prescribed after surgery, take this medicine exactly as directed.  · Change bandages (dressings) as directed by your health care provider.    · Avoid heavy lifting or strenuous activity until your health care provider says it is okay. Resume your normal activities as directed.    · Stop smoking if you smoke. This is a risk factor for poor wound healing.    · Stop taking the pill (oral contraceptives) unless your health care provider recommends otherwise.    · Maintain good control of your blood pressure.    · Exercise regularly or as instructed by your health care provider.    · Eat a heart-healthy diet. Talk to your health care provider about how to lower blood lipids (cholesterol and triglycerides).    · Keep all follow-up visits as directed by your health care provider. Make an appointment for the removal of stitches (sutures) or staples.  SEEK MEDICAL CARE IF:   · You have increased bleeding from the incision  site.    · You notice redness, swelling, or increasing pain at the incision site.    · You notice swelling in your neck or have difficulty breathing or talking.    · You notice a bad smell or pus coming from the incision site or dressing.    · You have a fever.   · You develop a rash.    · You develop any reaction or side effects to medicine given.    SEEK IMMEDIATE MEDICAL CARE IF:   · Your initial symptoms are getting worse rather than better.    · You develop any abnormal bruising or bleeding.    · You have difficulty breathing.    · You develop chest pain, shortness of breath, or pain or swelling in your legs.    · You have a return of symptoms or problems that caused you to have this surgery.    · You develop a temporary loss of vision.    · You develop temporary numbness on one side.    · You develop a temporary inability to speak (aphasia).    · You develop temporary weakness.    MAKE SURE YOU:   · Understand these instructions.  · Will watch your condition.  · Will get help right away if you are not doing well or get worse.     This information is not intended to replace advice given to you by your health care provider. Make sure you discuss any questions you have with your health care provider.     Document Released: 07/07/2006 Document Revised: 01/08/2016 Document Reviewed: 05/21/2014  [employer] Interactive Patient Education ©2016 [employer]      Discharged to home by car with relative. Discharged via wheelchair, hospital escort: Yes.  Special equipment needed: Not Applicable    Be sure to schedule a follow-up appointment with your primary care doctor or any specialists as instructed.     Discharge Plan:   Diet Plan: Discussed  Activity Level: Discussed  Confirmed Follow up Appointment: Patient to Call and Schedule Appointment  Confirmed Symptoms Management: Discussed  Medication Reconciliation Updated: Yes  Pneumococcal Vaccine Given - only chart on this line when given:  (refused)  Influenza Vaccine  Indication: Not indicated: Previously immunized this influenza season and > 8 years of age    I understand that a diet low in cholesterol, fat, and sodium is recommended for good health. Unless I have been given specific instructions below for another diet, I accept this instruction as my diet prescription.   Other diet: regular diabetic    Special Instructions: None    · Is patient discharged on Warfarin / Coumadin?   No     Depression / Suicide Risk    As you are discharged from this [facility] facility, it is important to learn how to keep safe from harming yourself.    Recognize the warning signs:  · Abrupt changes in personality, positive or negative- including increase in energy   · Giving away possessions  · Change in eating patterns- significant weight changes-  positive or negative  · Change in sleeping patterns- unable to sleep or sleeping all the time   · Unwillingness or inability to communicate  · Depression  · Unusual sadness, discouragement and loneliness  · Talk of wanting to die  · Neglect of personal appearance   · Rebelliousness- reckless behavior  · Withdrawal from people/activities they love  · Confusion- inability to concentrate     If you or a loved one observes any of these behaviors or has concerns about self-harm, here's what you can do:  · Talk about it- your feelings and reasons for harming yourself  · Remove any means that you might use to hurt yourself (examples: pills, rope, extension cords, firearm)  · Get professional help from the community (Mental Health, Substance Abuse, psychological counseling)  · Do not be alone:Call your Safe Contact- someone whom you trust who will be there for you.  · Call your local CRISIS HOTLINE 442-2230 or 378-753-1860  · Call your local Children's Mobile Crisis Response Team [place] (643) 783-8045 or www.[employer]  · Call the toll free National Suicide Prevention Hotlines   · National Suicide Prevention Lifeline 673-957-GVHT  (7130)  · [facility] 800-SUICIDE (277-8116)

## 2018-03-26 ENCOUNTER — PATIENT OUTREACH (OUTPATIENT)
Dept: HEALTH INFORMATION MANAGEMENT | Facility: OTHER | Age: 68
End: 2018-03-26

## 2018-03-29 ENCOUNTER — TELEPHONE (OUTPATIENT)
Dept: CARDIOLOGY | Facility: MEDICAL CENTER | Age: 68
End: 2018-03-29

## 2018-03-29 ENCOUNTER — HOSPITAL ENCOUNTER (OUTPATIENT)
Dept: LAB | Facility: MEDICAL CENTER | Age: 68
End: 2018-03-29
Attending: NURSE PRACTITIONER
Payer: MEDICARE

## 2018-03-29 ENCOUNTER — OFFICE VISIT (OUTPATIENT)
Dept: CARDIOLOGY | Facility: MEDICAL CENTER | Age: 68
End: 2018-03-29
Payer: MEDICARE

## 2018-03-29 VITALS
DIASTOLIC BLOOD PRESSURE: 80 MMHG | BODY MASS INDEX: 29.8 KG/M2 | WEIGHT: 220 LBS | OXYGEN SATURATION: 91 % | HEART RATE: 81 BPM | SYSTOLIC BLOOD PRESSURE: 118 MMHG | HEIGHT: 72 IN

## 2018-03-29 DIAGNOSIS — R42 DIZZINESS: ICD-10-CM

## 2018-03-29 DIAGNOSIS — I50.22 CHRONIC SYSTOLIC CHF (CONGESTIVE HEART FAILURE) (HCC): ICD-10-CM

## 2018-03-29 DIAGNOSIS — I95.1 ORTHOSTATIC HYPOTENSION: ICD-10-CM

## 2018-03-29 DIAGNOSIS — I10 ESSENTIAL HYPERTENSION: ICD-10-CM

## 2018-03-29 DIAGNOSIS — I25.10 CORONARY ARTERY DISEASE INVOLVING NATIVE HEART WITHOUT ANGINA PECTORIS, UNSPECIFIED VESSEL OR LESION TYPE: ICD-10-CM

## 2018-03-29 DIAGNOSIS — I21.3 ST ELEVATION MYOCARDIAL INFARCTION (STEMI), UNSPECIFIED ARTERY (HCC): ICD-10-CM

## 2018-03-29 LAB
ANISOCYTOSIS BLD QL SMEAR: ABNORMAL
BASOPHILS # BLD AUTO: 2.6 % (ref 0–1.8)
BASOPHILS # BLD: 0.07 K/UL (ref 0–0.12)
EKG IMPRESSION: NORMAL
EOSINOPHIL # BLD AUTO: 0 K/UL (ref 0–0.51)
EOSINOPHIL NFR BLD: 0 % (ref 0–6.9)
ERYTHROCYTE [DISTWIDTH] IN BLOOD BY AUTOMATED COUNT: 82.3 FL (ref 35.9–50)
HCT VFR BLD AUTO: 41.8 % (ref 42–52)
HGB BLD-MCNC: 13.8 G/DL (ref 14–18)
LYMPHOCYTES # BLD AUTO: 0.47 K/UL (ref 1–4.8)
LYMPHOCYTES NFR BLD: 17.4 % (ref 22–41)
MACROCYTES BLD QL SMEAR: ABNORMAL
MANUAL DIFF BLD: NORMAL
MCH RBC QN AUTO: 34.3 PG (ref 27–33)
MCHC RBC AUTO-ENTMCNC: 33 G/DL (ref 33.7–35.3)
MCV RBC AUTO: 104 FL (ref 81.4–97.8)
MONOCYTES # BLD AUTO: 0.28 K/UL (ref 0–0.85)
MONOCYTES NFR BLD AUTO: 10.4 % (ref 0–13.4)
MORPHOLOGY BLD-IMP: NORMAL
NEUTROPHILS # BLD AUTO: 1.88 K/UL (ref 1.82–7.42)
NEUTROPHILS NFR BLD: 69.6 % (ref 44–72)
NRBC # BLD AUTO: 0 K/UL
NRBC BLD-RTO: 0 /100 WBC
OVALOCYTES BLD QL SMEAR: NORMAL
PLATELET # BLD AUTO: 128 K/UL (ref 164–446)
PMV BLD AUTO: 11.6 FL (ref 9–12.9)
POIKILOCYTOSIS BLD QL SMEAR: NORMAL
RBC # BLD AUTO: 4.02 M/UL (ref 4.7–6.1)
RBC BLD AUTO: PRESENT
SCHISTOCYTES BLD QL SMEAR: NORMAL
T4 FREE SERPL-MCNC: 1 NG/DL (ref 0.53–1.43)
WBC # BLD AUTO: 2.7 K/UL (ref 4.8–10.8)

## 2018-03-29 PROCEDURE — 93000 ELECTROCARDIOGRAM COMPLETE: CPT | Performed by: NURSE PRACTITIONER

## 2018-03-29 PROCEDURE — 85027 COMPLETE CBC AUTOMATED: CPT

## 2018-03-29 PROCEDURE — 84439 ASSAY OF FREE THYROXINE: CPT | Mod: GA

## 2018-03-29 PROCEDURE — 36415 COLL VENOUS BLD VENIPUNCTURE: CPT | Mod: GA

## 2018-03-29 PROCEDURE — 85007 BL SMEAR W/DIFF WBC COUNT: CPT

## 2018-03-29 PROCEDURE — 99214 OFFICE O/P EST MOD 30 MIN: CPT | Performed by: NURSE PRACTITIONER

## 2018-03-29 ASSESSMENT — ENCOUNTER SYMPTOMS
NAUSEA: 0
HEADACHES: 1
VOMITING: 0
DIZZINESS: 0
FEVER: 0
CHILLS: 0
PALPITATIONS: 0
COUGH: 1
SHORTNESS OF BREATH: 1

## 2018-03-29 NOTE — PROGRESS NOTES
Chief Complaint   Patient presents with   • Coronary Artery Disease   • Atrial Fibrillation     Subjective:   Francesco Schulte is a 67 y.o. male who presents today with polycythemia vera, diabetes mellitus, atrial fibrillation and coronary artery disease. The patient is compliant with our request and Brilinta. He was previously on beta blocker but could not tolerate it due to low blood pressure. Patient comes in with his daughter today who is a nurse and reports significant orthostasis. He reports blood pressure being 140 systolic and drops to mid 80s when standing up. Orthostatic blood pressures were performed today and office and which lying down was 118/80, sitting 92/60, and standing 102/66.    It is somewhat difficult to ascertain the exact nature of these episodes. Patient recently had right CEA approximately 3 days ago. He was experiencing headaches prior to the CEA and those resolved with surgery. He now reports for the past several days the return of his headaches. These headaches are worse when he changes position from lying to sitting or sitting to standing. He becomes very breathless as well. Reports no dizziness. He and his daughter asking about some medication to increase his blood pressure. I have explained that these medications have other side effects and in light of his cardiac history would likely push him into heart failure in light of EF being 45%.    EKG today was obtained indicating sinus rhythm with [facility]    Echocardiography Laboratory 9/27/17  CONCLUSIONS  Prior echo done on 03-. No significant change noted compared to prior study. Mildly reduced left ventricular systolic function.  Left ventricular ejection fraction is visually estimated to be 45% with   wall motion abnormalities as noted above.  Grade I diastolic dysfunction.  Normal inferior vena cava size and inspiratory collapse.    February 2014 anterior STEMI. Again in 2014, Plavix was held for a few days for neck  "injections and patient had MI requiring stenting. March 2016 patient had MI with angioplasty. Brilinta was started at that time.    Past Medical History:   Diagnosis Date   • Anesthesia     resistant to pain meds   • Breath shortness    • Cancer (CMS-HCC)     polycythemia vera   • Diabetes (CMS-HCC)     insulin and oral meds   • Family history of punctured lung 2002    left lung   • Heart attack    • Hemorrhagic disorder (CMS-HCC)     on blood thinners   • High cholesterol    • Ischemic cardiomyopathy    • Kidney stones     hx of kidney stones   • Orthostatic hypotension 3/29/2018   • Pain     headache pain   • Polycythemia vera(238.4)    • Stroke (CMS-HCC) 2016    r/t afib; eye issues resolved afterward   • Urinary bladder disorder     enlarged prostate, weak stream, difficulty urinating   • Vertigo      Past Surgical History:   Procedure Laterality Date   • CAROTID ENDARTERECTOMY Right 3/21/2018    Procedure: CAROTID ENDARTERECTOMY- W/EEG MONITORING;  Surgeon: Benny Morfin M.D.;  Location: SURGERY [facility];  Service: General   • APPENDECTOMY     • CATH PLACEMENT      right arm   • LAMINOTOMY      diskectomy; C4   • OTHER CARDIAC SURGERY      stents x 3     History reviewed. No pertinent family history.  Social History     Social History   • Marital status:      Spouse name: N/A   • Number of children: N/A   • Years of education: N/A     Occupational History   • Not on file.     Social History Main Topics   • Smoking status: Never Smoker   • Smokeless tobacco: Never Used   • Alcohol use No      Comment: occ   • Drug use: No   • Sexual activity: Not on file     Other Topics Concern   • Not on file     Social History Narrative   • No narrative on file     Allergies   Allergen Reactions   • Metformin Unspecified     \"Similar to a heart attack, I was hospitalized\"   • Simvastatin      Myalgias   • Statins [Hmg-Coa-R Inhibitors]      Muscle ache, joint pain     Outpatient Encounter Prescriptions as of " "3/29/2018   Medication Sig Dispense Refill   • therapeutic multivitamin-minerals (THERAGRAN-M) Tab Take 1 Tab by mouth every day.     • HYDROcodone-acetaminophen (NORCO) 5-325 MG Tab per tablet Take 1-2 Tabs by mouth every four hours as needed.     • hydroxyurea (HYDREA) 500 MG Cap Take 1,500 mg by mouth every evening. Alternates taking 1000 mg one day and 1500 the next day     • apixaban (ELIQUIS) 5mg Tab Take 1 Tab by mouth 2 Times a Day. 180 Tab 1   • ticagrelor (BRILINTA) 90 MG Tab tablet Take 1 Tab by mouth 2 Times a Day. 180 Tab 3   • insulin glargine (LANTUS) 100 UNIT/ML Solution Inject 20 Units as instructed every evening.     • tamsulosin (FLOMAX) 0.4 MG capsule Take 0.4 mg by mouth every evening.     • finasteride (PROSCAR) 5 MG Tab Take 5 mg by mouth every day.     • colesevelam (WELCHOL) 625 MG Tab Take 625 mg by mouth 2 times a day, with meals.     • glipiZIDE (GLUCOTROL) 10 MG Tab Take 10 mg by mouth every day.       No facility-administered encounter medications on file as of 3/29/2018.      Review of Systems   Constitutional: Negative for chills and fever.   Respiratory: Positive for cough and shortness of breath.    Cardiovascular: Negative for chest pain and palpitations.   Gastrointestinal: Negative for nausea and vomiting.   Neurological: Positive for headaches. Negative for dizziness.      Objective:   /70   Pulse 81   Ht 1.84 m (6' 0.44\")   Wt 99.8 kg (220 lb)   SpO2 91%   BMI 29.48 kg/m²     Physical Exam   Constitutional: He is oriented to person, place, and time. He appears well-developed and well-nourished.   HENT:   Head: Normocephalic and atraumatic.   Eyes: EOM are normal.   Cardiovascular: Normal rate, regular rhythm, normal heart sounds and intact distal pulses.    No murmur heard.  Pulmonary/Chest: Effort normal and breath sounds normal.   Abdominal: Soft.   Musculoskeletal: He exhibits no edema.   Neurological: He is alert and oriented to person, place, and time.   Skin: " Skin is warm and dry.   Psychiatric: He has a normal mood and affect. His behavior is normal. Judgment and thought content normal.   Nursing note and vitals reviewed.    Assessment:     1. ST elevation myocardial infarction (STEMI), unspecified artery (CMS-HCC)     2. Essential hypertension     3. Chronic systolic CHF (congestive heart failure) ([facility])     4. Coronary artery disease involving native heart without angina pectoris, unspecified vessel or lesion type     5. Orthostatic hypotension       Medical Decision Making:  Today's Assessment / Status / Plan:   1. Obtain CBC and thyroid panel. Had recent phlebotomy check for anemia  2. Increase sodium and fluid intake.  3. Obtain Zio patch to determine if patient is having abnormal heart rhythms.  4. Consider discontinuing Brilinta as it has been over a year since his last stent.    Collaborating MD is Dr. Coreas

## 2018-03-29 NOTE — TELEPHONE ENCOUNTER
PT is scheduled on 4-3-18 for a Nitro tilt table with [facility] per Brianna Aparicio. No meds to stop. Patient was told to check in at 1:00 for a 1:30 appt.

## 2018-03-30 ENCOUNTER — TELEPHONE (OUTPATIENT)
Dept: CARDIOLOGY | Facility: MEDICAL CENTER | Age: 68
End: 2018-03-30

## 2018-03-30 NOTE — TELEPHONE ENCOUNTER
Message   Received: Yesterday   Message Contents   DAMON Rouse R.N.             Please inform patient I have discussed case further with physician recommend counseling tilt table to continue with the rest of plan of care. Please cancel tilt table.      ___________________________________    Attempted to contact patient, no answer.  Left message stating after the APN collaborated with MD, the Tilt Table has been cancelled as it will not give any further pertinent information, proceed with Holter monitor on 4/3 and follow up as planned.  Advised patient to call back.     Cc: Antwan    ____________________________________    0906  patient is returning your call   Received: Today   Message Contents   CAITLIN Rosas/Esthela       Patient is returning your call from this morning. He can be reached at [phone].      __________________________________    Contacted patient, discussed above note. Patient acknowledges understanding and expresses concern about headaches he experiences while laying down or even at various degrees.  He states he'll get some relief if he's in vertical position only, but makes it difficult to rest at night.  He states he's tried all OTC and Rx Norco and tramadol with no relief.  He states in the hospital he was given Oxycodone with no relief. He claims he's staying well hydrated.       He is asking if there is something our office can prescribe temporarily to try such as Fiorcet. Explained his providers are out of office. Advised calling PCP and/or going to urgent care.  Patient is refusing UC and states he'll never get in to see PCP and would like a cardiologist to prescribe if possible.      To ADD for advice

## 2018-03-30 NOTE — TELEPHONE ENCOUNTER
Discussed with ADD, patient will need to contact PCP, go to UC or ER for worsening headaches.      Attempted to contact patient, no answer.  Left detailed message.

## 2018-04-03 ENCOUNTER — OFFICE VISIT (OUTPATIENT)
Dept: MEDICAL GROUP | Age: 68
End: 2018-04-03
Payer: MEDICARE

## 2018-04-03 VITALS
BODY MASS INDEX: 30.18 KG/M2 | TEMPERATURE: 97.3 F | WEIGHT: 222.8 LBS | HEART RATE: 99 BPM | DIASTOLIC BLOOD PRESSURE: 74 MMHG | RESPIRATION RATE: 16 BRPM | OXYGEN SATURATION: 97 % | SYSTOLIC BLOOD PRESSURE: 122 MMHG | HEIGHT: 72 IN

## 2018-04-03 DIAGNOSIS — R51.9 CHRONIC DAILY HEADACHE: ICD-10-CM

## 2018-04-03 DIAGNOSIS — E78.5 DYSLIPIDEMIA: ICD-10-CM

## 2018-04-03 DIAGNOSIS — Z86.73 HISTORY OF STROKE: ICD-10-CM

## 2018-04-03 DIAGNOSIS — E11.8 TYPE 2 DIABETES MELLITUS WITH COMPLICATION, WITHOUT LONG-TERM CURRENT USE OF INSULIN (HCC): ICD-10-CM

## 2018-04-03 DIAGNOSIS — I65.21 STENOSIS OF RIGHT CAROTID ARTERY: ICD-10-CM

## 2018-04-03 DIAGNOSIS — I10 ESSENTIAL HYPERTENSION: ICD-10-CM

## 2018-04-03 DIAGNOSIS — R51.9 NONINTRACTABLE EPISODIC HEADACHE, UNSPECIFIED HEADACHE TYPE: ICD-10-CM

## 2018-04-03 DIAGNOSIS — R42 VERTIGO: ICD-10-CM

## 2018-04-03 PROBLEM — E87.1 HYPONATREMIA: Status: RESOLVED | Noted: 2017-03-13 | Resolved: 2018-04-03

## 2018-04-03 PROBLEM — E66.9 OBESITY (BMI 30-39.9): Status: RESOLVED | Noted: 2018-01-09 | Resolved: 2018-04-03

## 2018-04-03 PROBLEM — R11.2 NAUSEA & VOMITING: Status: RESOLVED | Noted: 2018-03-10 | Resolved: 2018-04-03

## 2018-04-03 PROBLEM — D72.829 LEUKOCYTOSIS: Status: RESOLVED | Noted: 2017-03-13 | Resolved: 2018-04-03

## 2018-04-03 PROCEDURE — 99214 OFFICE O/P EST MOD 30 MIN: CPT | Performed by: INTERNAL MEDICINE

## 2018-04-03 RX ORDER — GABAPENTIN 300 MG/1
300 CAPSULE ORAL 3 TIMES DAILY
Qty: 30 CAP | Refills: 1 | Status: SHIPPED | OUTPATIENT
Start: 2018-04-03 | End: 2018-05-09

## 2018-04-03 ASSESSMENT — ENCOUNTER SYMPTOMS
MUSCULOSKELETAL NEGATIVE: 1
HEADACHES: 1
RESPIRATORY NEGATIVE: 1
EYES NEGATIVE: 1
CARDIOVASCULAR NEGATIVE: 1
NAUSEA: 1
PSYCHIATRIC NEGATIVE: 1
CONSTITUTIONAL NEGATIVE: 1

## 2018-04-03 NOTE — PROGRESS NOTES
Subjective:      Francesco Schulte is a 67 y.o. male who presents with Headache  This is a new patient to me unable to see his PCP today. Comes in with progressively worsening right parietal headaches following his right-sided carotid endarterectomy a few weeks ago for 90% stenosis, without complication..     Postoperatively his headaches that he had preoperatively seemed to charlene, only to recur a week or so later. They usually pounding and throbbing and any evolving into more of a pressure study ache and constriction-like headache. They're associated with nausea but no vomiting. No photosensitivity. They keep him awake at night. Occasionally wake him from sleep. He says the Norco 10/325 it was prescribed previously was of no benefit. He has prescription on file for 5/325 however. Would like some help his headaches that does not lower his blood pressure too much since he's had problems with orthostatic hypotension recently.    Has had chronic headaches in the past control with oral pain relievers. However not as severe as his current headaches.    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     He has significant past medical history of essential thrombocytosis treated with Hydrea as well as diabetes type 2 on oral agents, without dependency on insulin except during hospitalizations.     Also has significant CAD status post old MI with PCI/stents, complicated by reduced ejection fraction 45%. Also has paroxysmal atrial fibrillation on anticoagulation with NOAC.    Outpatient Medications Prior to Visit   Medication Sig Dispense Refill   • therapeutic multivitamin-minerals (THERAGRAN-M) Tab Take 1 Tab by mouth every day.     • HYDROcodone-acetaminophen (NORCO) 5-325 MG Tab per tablet Take 1-2 Tabs by mouth every four hours as needed.     • hydroxyurea (HYDREA) 500 MG Cap Take 1,500 mg by mouth every evening. Alternates taking 1000 mg one day and 1500 the  "next day     • apixaban (ELIQUIS) 5mg Tab Take 1 Tab by mouth 2 Times a Day. 180 Tab 1   • ticagrelor (BRILINTA) 90 MG Tab tablet Take 1 Tab by mouth 2 Times a Day. 180 Tab 3   • insulin glargine (LANTUS) 100 UNIT/ML Solution Inject 20 Units as instructed every evening.     • tamsulosin (FLOMAX) 0.4 MG capsule Take 0.4 mg by mouth every evening.     • finasteride (PROSCAR) 5 MG Tab Take 5 mg by mouth every day.     • colesevelam (WELCHOL) 625 MG Tab Take 625 mg by mouth 2 times a day, with meals.     • glipiZIDE (GLUCOTROL) 10 MG Tab Take 10 mg by mouth every day.       No facility-administered medications prior to visit.      Allergies   Allergen Reactions   • Metformin Unspecified     \"Similar to a heart attack, I was hospitalized\"   • Simvastatin      Myalgias   • Statins [Hmg-Coa-R Inhibitors]      Muscle ache, joint pain                     HPI    Review of Systems   Constitutional: Negative.    HENT: Negative.    Eyes: Negative.    Respiratory: Negative.    Cardiovascular: Negative.    Gastrointestinal: Positive for nausea.   Genitourinary: Negative.    Musculoskeletal: Negative.    Skin: Negative.    Neurological: Positive for headaches.   Endo/Heme/Allergies: Negative.    Psychiatric/Behavioral: Negative.           Objective:     /74   Pulse 99   Temp 36.3 °C (97.3 °F)   Resp 16   Ht 1.84 m (6' 0.44\")   Wt 101.1 kg (222 lb 12.8 oz)   SpO2 97%   BMI 29.85 kg/m²      Physical Exam   Constitutional: He is oriented to person, place, and time. He appears well-developed and well-nourished. No distress.   HENT:   Head: Normocephalic and atraumatic.   Right Ear: External ear normal.   Left Ear: External ear normal.   Mouth/Throat: Oropharynx is clear and moist. No oropharyngeal exudate.   Eyes: Conjunctivae and EOM are normal. Pupils are equal, round, and reactive to light. Right eye exhibits no discharge.   Neck: Normal range of motion. Neck supple. No JVD present. No tracheal deviation present. No " thyromegaly present.   Cardiovascular: Normal rate, regular rhythm, normal heart sounds and intact distal pulses.  Exam reveals no gallop.    Pulmonary/Chest: Effort normal and breath sounds normal. No respiratory distress. He has no wheezes. He has no rales. He exhibits no tenderness.   Abdominal: Soft. Bowel sounds are normal. He exhibits no distension and no mass. There is no tenderness. There is no rebound and no guarding. No hernia.   Genitourinary: Rectal exam shows guaiac negative stool. No penile tenderness.   Musculoskeletal: He exhibits no edema or tenderness.   Lymphadenopathy:     He has no cervical adenopathy.   Neurological: He is alert and oriented to person, place, and time. He has normal reflexes. He displays normal reflexes. No cranial nerve deficit. He exhibits normal muscle tone. Coordination normal.   Skin: Skin is warm and dry. No rash noted. He is not diaphoretic. No erythema. No pallor.   Psychiatric: He has a normal mood and affect. His behavior is normal. Judgment and thought content normal.   Nursing note and vitals reviewed.    Hospital Outpatient Visit on 03/29/2018   Component Date Value   • Free T-4 03/29/2018 1.00    • WBC 03/29/2018 2.7*   • RBC 03/29/2018 4.02*   • Hemoglobin 03/29/2018 13.8*   • Hematocrit 03/29/2018 41.8*   • MCV 03/29/2018 104.0*   • MCH 03/29/2018 34.3*   • MCHC 03/29/2018 33.0*   • RDW 03/29/2018 82.3*   • Platelet Count 03/29/2018 128*   • MPV 03/29/2018 11.6    • Nucleated RBC 03/29/2018 0.00    • NRBC (Absolute) 03/29/2018 0.00    • Neutrophils-Polys 03/29/2018 69.60    • Lymphocytes 03/29/2018 17.40*   • Monocytes 03/29/2018 10.40    • Eosinophils 03/29/2018 0.00    • Basophils 03/29/2018 2.60*   • Neutrophils (Absolute) 03/29/2018 1.88    • Lymphs (Absolute) 03/29/2018 0.47*   • Monos (Absolute) 03/29/2018 0.28    • Eos (Absolute) 03/29/2018 0.00    • Baso (Absolute) 03/29/2018 0.07    • Anisocytosis 03/29/2018 1+    • Macrocytosis 03/29/2018 1+    •  Manual Diff Status 2018 PERFORMED    • Peripheral Smear Review 2018 see below    • RBC Morphology 2018 Present    • Poikilocytosis 2018 1+    • Ovalocytes 2018 1+    • Schistocytes 2018 1+    Office Visit on 2018   Component Date Value   • Report 2018                      Value:[facility]    Test Date:  2018  Pt Name:    MARZENA ROMEO             Department: [facility]  MRN:        [id number]                      Room:  Gender:     Male                         Technician: VON  :        1950                   Requested By:TYLOR BALDERAS  Order #:    [id number]                    Reading MD: Neptali Watkins MD    Measurements  Intervals                                Axis  Rate:       67                           P:          36  VA:         184                          QRS:        -56  QRSD:       114                          T:          102  QT:         428  QTc:        452    Interpretive Statements  SINUS RHYTHM  LEFT ANTERIOR FASCICULAR BLOCK  DELAYED PRECORDIAL R WAVE PROGRESSION  NONSPECIFIC ST-T WAVE ABNORMALITIES, LATERAL LEADS  Electronically Signed On 3- 22:41:15 PDT by Neptali Watkins MD     Admission on 2018, Discharged on 2018   Component Date Value   • PT 2018 15.5*   • INR 2018 1.26*   • Platelet Count 2018 200    • APTT 2018 31.5    • APTT 2018 73.1*   • WBC 2018 2.4*   • RBC 2018 3.75*   • Hemoglobin 2018 12.2*   • Hematocrit 2018 37.5*   • MCV 2018 100.0*   • MCH 2018 32.5    • MCHC 2018 32.5*   • RDW 2018 81.8*   • Platelet Count 2018 176    • MPV 2018 10.0    • Sodium 2018 136    • Potassium 2018 3.8    • Chloride 2018 106    • Co2 2018 25    • Glucose 2018 88    • Bun 2018 14    • Creatinine 2018 1.09    • Calcium 2018 9.6    • Anion Gap 2018 5.0    • GFR If   2018 >60    • GFR If Non  Ameri* 2018 >60    • APTT 2018 74.0*   • Glucose - Accu-Ck 2018 112*   • APTT 2018 120.3*   • Glucose - Accu-Ck 2018 169*   • Glucose - Accu-Ck 2018 214*   • APTT 2018 72.3*   • APTT 2018 59.4*   • Glucose - Accu-Ck 2018 170*   • Glucose - Accu-Ck 2018 201*   • Glucose - Accu-Ck 2018 148*   • Glucose - Accu-Ck 2018 197*   • APTT 2018 47.2*   • Glucose - Accu-Ck 2018 147*   • Glucose - Accu-Ck 2018 219*   Admission on 03/10/2018, Discharged on 2018   Component Date Value   • Report 03/10/2018                      Value:[facility]    Test Date:  2018-03-10  Pt Name:    MARZENA ROMEO             Department: [facility]  MRN:        [id number]                      Room:       3311  Gender:     Male                         Technician: [id number]  :        1950                   Requested By:FUNMI HIDALGO  Order #:    [id number]                    Reading MD: FUNMI HIDALGO. AMD    Measurements  Intervals                                Axis  Rate:       70                           P:          27  MO:         176                          QRS:        -51  QRSD:       114                          T:          100  QT:         420  QTc:        454    Interpretive Statements  SINUS RHYTHM  INCOMPLETE LEFT BUNDLE BRANCH BLOCK  ANTERIOR INFARCT, AGE INDETERMINATE  Compared to ECG 2017 08:35:45  Sinus bradycardia no longer present  Left-axis deviation no longer present  Myocardial infarct finding still present    Electronically Signed On 3- 7:32:15 PDT by FUNMI HIDALGO. AMD     • WBC 03/10/2018 3.7*   • RBC 03/10/2018 4.34*   • Hemoglobin 03/10/2018 13.9*   • Hematocrit 03/10/2018 41.0*   • MCV 03/10/2018 94.5    • MCH 03/10/2018 32.0    • MCHC 03/10/2018 33.9    • RDW 03/10/2018 80.1*   • Platelet  Count 03/10/2018 93*   • MPV 03/10/2018 9.7    • Neutrophils-Polys 03/10/2018 67.80    • Lymphocytes 03/10/2018 18.60*   • Monocytes 03/10/2018 10.30    • Eosinophils 03/10/2018 1.40    • Basophils 03/10/2018 0.80    • Immature Granulocytes 03/10/2018 1.10*   • Nucleated RBC 03/10/2018 0.00    • Lymphs (Absolute) 03/10/2018 0.69*   • Monos (Absolute) 03/10/2018 0.38    • Eos (Absolute) 03/10/2018 0.05    • Baso (Absolute) 03/10/2018 0.03    • Immature Granulocytes (a* 03/10/2018 0.04    • NRBC (Absolute) 03/10/2018 0.00    • Anisocytosis 03/10/2018 1+    • Macrocytosis 03/10/2018 1+    • Neutrophils (Absolute) 03/10/2018 2.51    • Sodium 03/10/2018 136    • Potassium 03/10/2018 2.9*   • Chloride 03/10/2018 106    • Co2 03/10/2018 19*   • Anion Gap 03/10/2018 11.0    • Glucose 03/10/2018 140*   • Bun 03/10/2018 16    • Creatinine 03/10/2018 1.08    • Calcium 03/10/2018 9.5    • AST(SGOT) 03/10/2018 21    • ALT(SGPT) 03/10/2018 19    • Alkaline Phosphatase 03/10/2018 53    • Total Bilirubin 03/10/2018 1.0    • Albumin 03/10/2018 4.6    • Total Protein 03/10/2018 8.4*   • Globulin 03/10/2018 3.8*   • A-G Ratio 03/10/2018 1.2    • Lipase 03/10/2018 30    • Troponin I 03/10/2018 <0.02    • PT 03/10/2018 15.9*   • INR 03/10/2018 1.28*   • APTT 03/10/2018 31.1    • GFR If  03/10/2018 >60    • GFR If Non  Ameri* 03/10/2018 >60    • Plt Estimation 03/10/2018 Decreased    • RBC Morphology 03/10/2018 Present    • Large Platelets 03/10/2018 1+    • Polychromia 03/10/2018 1+    • Poikilocytosis 03/10/2018 1+    • Ovalocytes 03/10/2018 1+    • Comments-Diff 03/10/2018 see below    • Report 03/10/2018                      Value:[facility]    Test Date:  2018-03-10  Pt Name:    MARZENA ROMEO             Department: [facility]  MRN:        [id number]                      Room:       3311  Gender:     Male                         Technician: [id number]  :        1950                    Requested By:FUNMI HIDALGO  Order #:    [id number]                    Reading MD: FUNMI HIDALGO. AMD    Measurements  Intervals                                Axis  Rate:       84                           P:          47  CO:         192                          QRS:        -40  QRSD:       116                          T:          99  QT:         412  QTc:        488    Interpretive Statements  SINUS RHYTHM  ATRIAL PREMATURE COMPLEX  INCOMPLETE LEFT BUNDLE BRANCH BLOCK  ANTERIOR Q WAVES, POSSIBLY DUE TO ILBBB  BASELINE WANDER IN LEAD(S) V2  Compared to ECG 03/10/2018 15:09:49  Atrial premature complex(es) now present  Q waves now present  Myocardial infarct finding no longer present    Elec                          tronically Signed On 3- 7:32:28 PDT by FUNMI HIDALGO. AMD     • TSH 03/10/2018 1.390    • Troponin I 03/10/2018 <0.02    • Magnesium 03/10/2018 1.8    • Troponin I 03/11/2018 0.02    • Sodium 03/11/2018 136    • Potassium 03/11/2018 3.5*   • Chloride 03/11/2018 107    • Co2 03/11/2018 22    • Glucose 03/11/2018 181*   • Bun 03/11/2018 14    • Creatinine 03/11/2018 0.88    • Calcium 03/11/2018 8.7    • Anion Gap 03/11/2018 7.0    • WBC 03/11/2018 3.4*   • RBC 03/11/2018 4.00*   • Hemoglobin 03/11/2018 12.9*   • Hematocrit 03/11/2018 37.8*   • MCV 03/11/2018 94.5    • MCH 03/11/2018 32.3    • MCHC 03/11/2018 34.1    • RDW 03/11/2018 77.9*   • Platelet Count 03/11/2018 96*   • Neutrophils-Polys 03/11/2018 82.80*   • Lymphocytes 03/11/2018 10.40*   • Monocytes 03/11/2018 5.60    • Eosinophils 03/11/2018 0.00    • Basophils 03/11/2018 0.30    • Immature Granulocytes 03/11/2018 0.90    • Nucleated RBC 03/11/2018 0.00    • Neutrophils (Absolute) 03/11/2018 2.79    • Lymphs (Absolute) 03/11/2018 0.35*   • Monos (Absolute) 03/11/2018 0.19    • Eos (Absolute) 03/11/2018 0.00    • Baso (Absolute) 03/11/2018 0.01    • Immature Granulocytes (a* 03/11/2018 0.03    • NRBC (Absolute) 03/11/2018  0.00    • Glucose - Accu-Ck 03/10/2018 191*   • GFR If  03/11/2018 >60    • GFR If Non  Ameri* 03/11/2018 >60    • Glucose - Accu-Ck 03/11/2018 131*   • Troponin I 03/11/2018 0.02    • Glucose - Accu-Ck 03/11/2018 199*      Lab Results   Component Value Date/Time    HBA1C 7.2 (H) 01/10/2018 08:41 AM    HBA1C 7.5 (H) 09/27/2017 09:09 AM     Lab Results   Component Value Date/Time    SODIUM 136 03/21/2018 03:32 AM    POTASSIUM 3.8 03/21/2018 03:32 AM    CHLORIDE 106 03/21/2018 03:32 AM    CO2 25 03/21/2018 03:32 AM    GLUCOSE 88 03/21/2018 03:32 AM    BUN 14 03/21/2018 03:32 AM    CREATININE 1.09 03/21/2018 03:32 AM    ALKPHOSPHAT 53 03/10/2018 03:10 PM    ASTSGOT 21 03/10/2018 03:10 PM    ALTSGPT 19 03/10/2018 03:10 PM    TBILIRUBIN 1.0 03/10/2018 03:10 PM     Lab Results   Component Value Date/Time    INR 1.26 (H) 03/20/2018 03:35 PM    INR 1.28 (H) 03/10/2018 03:10 PM    INR 1.4 01/18/2018 09:02 AM     Lab Results   Component Value Date/Time    CHOLSTRLTOT 143 01/10/2018 08:41 AM    LDL 85 01/10/2018 08:41 AM    HDL 40 01/10/2018 08:41 AM    TRIGLYCERIDE 88 01/10/2018 08:41 AM       No results found for: TESTOSTERONE  No results found for: TSH  Lab Results   Component Value Date/Time    FREET4 1.00 03/29/2018 11:38 AM    FREET4 1.31 12/01/2016 05:47 AM     No results found for: URICACID  No components found for: VITB12  No results found for: 25HYDROXY              Assessment/Plan:     1. Chronic daily headache-these headaches seemed to be a combination of both vascular headaches (with this throbbing pulsating nature) as well as muscular contraction type headaches (with a pressure-like sensation). He may also be some residual from the cerebral ischemia he may have experienced with his significant right carotid occlusion. Or perhaps a neuritis secondary to this.     Sedimentation rate should be obtained to rule out PMR. If significantly elevated, consideration for steroids might should be given  as this would help him dramatically. However I would expect the sedimentation rate to be greater than 70, or at least greater than 60, before instituting steroid therapy..       Prolonged discussion regarding options for treatment which would not lower his blood pressure and aggravate his orthostatic hypotension, as well as not giving him something that might raise his blood pressure and risk worsening his LV dysfunction. So treatment with calcium Channel blockers and beta blockers seem to be problematic.    Possibly treating with muscle relaxants or benzodiazepines might also be an option but risk of habituation with little large.    Therefore we decided on treating with gabapentin as this might help with sleep and hopefully might reduce a neuritis that might be present as well as headaches. Other options for treatment might include tricyclic and a presence but side effect profile might be problematic for the patient.    We'll start on gabapentin 300 mg at bedtime and follow-up with PCP to see how your responses.       2. Nonintractable episodic headache, unspecified headache type-as above     - gabapentin (NEURONTIN) 300 MG Cap; Take 1 Cap by mouth 3 times a day.  Dispense: 30 Cap; Refill: 1    3. Vertigo   -This has resolved.    4. Essential hypertension    Under good control. Continue same regimen.  '    5. Type 2 diabetes mellitus with complication, without long-term current use of insulin (CMS-[facility])   Under good control. Continue same regimen.'  - Diabetic Monofilament LE Exam    6. Dyslipidemia     Under good control. Continue same regimen.'    7. Stenosis of right carotid artery-Right CEA 3/21/18   Under good control. Continue same regimen.'    8. History of stroke- old right parietal/occipital    Under good control. Continue same regimen.      45 minute face-to-face encounter took place today.  More than half of this time was spent in the coordination of care of the above problems, as well as counseling, and  as discussed above..

## 2018-04-04 ENCOUNTER — HOSPITAL ENCOUNTER (OUTPATIENT)
Dept: LAB | Facility: MEDICAL CENTER | Age: 68
End: 2018-04-04
Attending: INTERNAL MEDICINE
Payer: MEDICARE

## 2018-04-04 ENCOUNTER — TELEPHONE (OUTPATIENT)
Dept: CARDIOLOGY | Facility: MEDICAL CENTER | Age: 68
End: 2018-04-04

## 2018-04-04 ENCOUNTER — TELEPHONE (OUTPATIENT)
Dept: MEDICAL GROUP | Age: 68
End: 2018-04-04

## 2018-04-04 DIAGNOSIS — R51.9 NONINTRACTABLE EPISODIC HEADACHE, UNSPECIFIED HEADACHE TYPE: ICD-10-CM

## 2018-04-04 DIAGNOSIS — G44.52 NEW DAILY PERSISTENT HEADACHE: ICD-10-CM

## 2018-04-04 DIAGNOSIS — R70.0 ELEVATED SED RATE: ICD-10-CM

## 2018-04-04 LAB — ERYTHROCYTE [SEDIMENTATION RATE] IN BLOOD BY WESTERGREN METHOD: 68 MM/HOUR (ref 0–20)

## 2018-04-04 PROCEDURE — 36415 COLL VENOUS BLD VENIPUNCTURE: CPT

## 2018-04-04 PROCEDURE — 85652 RBC SED RATE AUTOMATED: CPT

## 2018-04-04 RX ORDER — PREDNISONE 20 MG/1
TABLET ORAL
Qty: 100 TAB | Refills: 0 | Status: SHIPPED | OUTPATIENT
Start: 2018-04-04 | End: 2018-05-09

## 2018-04-04 NOTE — TELEPHONE ENCOUNTER
Discussed with FELIX Alfaro.  No contraindication to take prednisone.      Contacted patient back to inform.  He was appreciative and denied any other concerns or questions.

## 2018-04-04 NOTE — TELEPHONE ENCOUNTER
" warm transferred patient.  Patient calling to inform us he saw a \"substitute doctor\" regarding headaches.  Dr. Gamaliel Mcknight.  He ordered a  Sed Rate which resulted at 68.  Per patient, he states that Dr. Mcknight thinks there may be some inflammation on part of his brain causing his severe headaches and was prescribed prednisone.    Patient wants to make sure prednisone will not interact with his other medications.     To Brianna WASHINGTON  "

## 2018-04-04 NOTE — TELEPHONE ENCOUNTER
The patient called with elevated sedimentation rate result of 68, which might indicate polymyalgia rheumatica. Discussed this possibility with the patient and the need for therapeutic trial of high-dose prednisone since he has visual symptoms as well as severe headache. He is agreeable to this. Prescription sent to local pharmacy. Since he has some visual symptoms with spasticity for his eyes I've opted for 80 mg a day prednisone rather than 40-60. If no better after week then PMR will effectively be ruled out. His sedimentation rate one year ago was = 8.    It's also possible that the elevated sedimentation rate is secondary to postoperative changes and not indicative of polymyalgia rheumatica and patient appraised of this possibility. I also explained that usually with PMR, the sedimentation rate is over 80 and often over 100. So sedimentation rate of 68 is still in the gray zone, but is worth a therapeutic trial in view of his severe headache not responding to anything else, and with his visual symptoms.

## 2018-04-05 ENCOUNTER — NON-PROVIDER VISIT (OUTPATIENT)
Dept: CARDIOLOGY | Facility: MEDICAL CENTER | Age: 68
End: 2018-04-05
Payer: MEDICARE

## 2018-04-05 ENCOUNTER — TELEPHONE (OUTPATIENT)
Dept: CARDIOLOGY | Facility: MEDICAL CENTER | Age: 68
End: 2018-04-05

## 2018-04-05 DIAGNOSIS — I47.10 SVT (SUPRAVENTRICULAR TACHYCARDIA): ICD-10-CM

## 2018-04-05 DIAGNOSIS — I49.3 PVC (PREMATURE VENTRICULAR CONTRACTION): ICD-10-CM

## 2018-04-05 DIAGNOSIS — I95.9 HYPOTENSION, UNSPECIFIED HYPOTENSION TYPE: ICD-10-CM

## 2018-04-07 ENCOUNTER — TELEPHONE (OUTPATIENT)
Dept: MEDICAL GROUP | Facility: LAB | Age: 68
End: 2018-04-07

## 2018-04-07 NOTE — TELEPHONE ENCOUNTER
I called the patient to see how he is doing with his prednisone for his headache, and he said he is doing much better, and that the headache is down to a 3 out of 10. He is having minimal side effects from the 80 mg of prednisone. I explained that his improvement could be consistent with successful treatment of PMR, but that the set rate of 68 does not definitively make the diagnosis. Certainly it would need to be greater than 60 to make a diagnosis, but sedimentation rate greater  is more typical. He could be better in spite of the steroids, or steroids may be helping for other reasons other than PMR.    Encouraged him to follow the directions on the bottle and to start tapering his prednisone after one week as directed. And also follow-up next week with his primary care provider for further management and possible rheumatology referral.    Will continue the prednisone 80 mg a day for week and then go to 60 mg a day for week then 40 mg a day for week and then 20 mg a day for a week and further adjustments pending his response to continue tapering down to a maintenance of 5-10 mg a day, which she might need to continue for several months if he in fact have PMR

## 2018-04-18 ENCOUNTER — TELEPHONE (OUTPATIENT)
Dept: MEDICAL GROUP | Facility: MEDICAL CENTER | Age: 68
End: 2018-04-18

## 2018-04-18 NOTE — TELEPHONE ENCOUNTER
Phone Number Called: [phone] (home)     Message: Pt informed that Nereyda not in the office today. Pt rescheduled.     Left Message for patient to call back: no

## 2018-04-24 ENCOUNTER — PATIENT OUTREACH (OUTPATIENT)
Dept: HEALTH INFORMATION MANAGEMENT | Facility: OTHER | Age: 68
End: 2018-04-24

## 2018-04-30 NOTE — PROGRESS NOTES
Francesco Schulte was an emergent admission who discharged on 3/23. Patient advocate was able to speak to the patient several times to confirm that he was following up with providers as well as taking his medications as directed. Patient kept appointments with his PCP 4/3 and Pulmonary 3/29. Patient has follow ups on 5/2 with his PCP and 5/9 with Cardiology. PPS 80%

## 2018-05-02 ENCOUNTER — TELEPHONE (OUTPATIENT)
Dept: CARDIOLOGY | Facility: MEDICAL CENTER | Age: 68
End: 2018-05-02

## 2018-05-02 ENCOUNTER — OFFICE VISIT (OUTPATIENT)
Dept: MEDICAL GROUP | Facility: MEDICAL CENTER | Age: 68
End: 2018-05-02
Payer: MEDICARE

## 2018-05-02 VITALS
SYSTOLIC BLOOD PRESSURE: 126 MMHG | HEART RATE: 97 BPM | OXYGEN SATURATION: 100 % | HEIGHT: 72 IN | DIASTOLIC BLOOD PRESSURE: 82 MMHG | WEIGHT: 216.6 LBS | RESPIRATION RATE: 20 BRPM | TEMPERATURE: 97.8 F | BODY MASS INDEX: 29.34 KG/M2

## 2018-05-02 DIAGNOSIS — G44.52 NEW DAILY PERSISTENT HEADACHE: ICD-10-CM

## 2018-05-02 DIAGNOSIS — I10 ESSENTIAL HYPERTENSION: ICD-10-CM

## 2018-05-02 DIAGNOSIS — R70.0 ELEVATED SED RATE: ICD-10-CM

## 2018-05-02 PROCEDURE — 0296T PR EXT ECG > 48HR TO 21 DAY RCRD W/CONECT INTL RCRD: CPT | Performed by: INTERNAL MEDICINE

## 2018-05-02 PROCEDURE — 99214 OFFICE O/P EST MOD 30 MIN: CPT | Performed by: NURSE PRACTITIONER

## 2018-05-02 PROCEDURE — 0298T PR EXT ECG > 48HR TO 21 DAY REVIEW AND INTERPRETATN: CPT | Performed by: INTERNAL MEDICINE

## 2018-05-02 NOTE — ASSESSMENT & PLAN NOTE
Seen by Dr. Mcknight in early April, sed rate of 68  Was given trial of prednisone with taper which helped significantly. HA now gone or very mild at time- 2/10 at worst

## 2018-05-02 NOTE — TELEPHONE ENCOUNTER
Pt notified of Zio patch results per Dr. Song. He is appreciative of call and notes that he had been taking prednisone which was causing all sorts of issues with BP and HR. He notes that he is now off medication and will continue to monitor HR. Pt to FU with AA 5/9.    ----- Message from Liliana Song M.D. sent at 5/2/2018 11:47 AM PDT -----  Ziopatch reviewed. Short runs of SVT and wide-complex rhythm. Patient is unable to tolerate beta blockers. For now no changes in medications. Patient should avoid caffeine as much as possible.  Thank you  AA

## 2018-05-04 ENCOUNTER — HOSPITAL ENCOUNTER (OUTPATIENT)
Dept: LAB | Facility: MEDICAL CENTER | Age: 68
End: 2018-05-04
Attending: NURSE PRACTITIONER
Payer: MEDICARE

## 2018-05-04 DIAGNOSIS — R70.0 ELEVATED SED RATE: ICD-10-CM

## 2018-05-04 LAB
ALBUMIN SERPL BCP-MCNC: 3.5 G/DL (ref 3.2–4.9)
ALBUMIN/GLOB SERPL: 1.1 G/DL
ALP SERPL-CCNC: 42 U/L (ref 30–99)
ALT SERPL-CCNC: 19 U/L (ref 2–50)
ANION GAP SERPL CALC-SCNC: 7 MMOL/L (ref 0–11.9)
AST SERPL-CCNC: 15 U/L (ref 12–45)
BILIRUB SERPL-MCNC: 0.6 MG/DL (ref 0.1–1.5)
BUN SERPL-MCNC: 17 MG/DL (ref 8–22)
CALCIUM SERPL-MCNC: 9.5 MG/DL (ref 8.5–10.5)
CHLORIDE SERPL-SCNC: 108 MMOL/L (ref 96–112)
CHOLEST SERPL-MCNC: 169 MG/DL (ref 100–199)
CO2 SERPL-SCNC: 23 MMOL/L (ref 20–33)
CREAT SERPL-MCNC: 0.88 MG/DL (ref 0.5–1.4)
ERYTHROCYTE [SEDIMENTATION RATE] IN BLOOD BY WESTERGREN METHOD: 42 MM/HOUR (ref 0–20)
EST. AVERAGE GLUCOSE BLD GHB EST-MCNC: 206 MG/DL
GLOBULIN SER CALC-MCNC: 3.2 G/DL (ref 1.9–3.5)
GLUCOSE SERPL-MCNC: 151 MG/DL (ref 65–99)
HBA1C MFR BLD: 8.8 % (ref 0–5.6)
HDLC SERPL-MCNC: 46 MG/DL
LDLC SERPL CALC-MCNC: 103 MG/DL
POTASSIUM SERPL-SCNC: 3.7 MMOL/L (ref 3.6–5.5)
PROT SERPL-MCNC: 6.7 G/DL (ref 6–8.2)
SODIUM SERPL-SCNC: 138 MMOL/L (ref 135–145)
TRIGL SERPL-MCNC: 100 MG/DL (ref 0–149)

## 2018-05-04 PROCEDURE — 83036 HEMOGLOBIN GLYCOSYLATED A1C: CPT

## 2018-05-04 PROCEDURE — 36415 COLL VENOUS BLD VENIPUNCTURE: CPT

## 2018-05-04 PROCEDURE — 85652 RBC SED RATE AUTOMATED: CPT

## 2018-05-04 PROCEDURE — 80053 COMPREHEN METABOLIC PANEL: CPT

## 2018-05-04 PROCEDURE — 80061 LIPID PANEL: CPT

## 2018-05-06 NOTE — PROGRESS NOTES
Subjective:     Chief Complaint   Patient presents with   • Head Ache     Francesco Schulte is a 67 y.o. male here today to follow up on:    Elevated sed rate, new daily DOHERTY  Seen by Dr. Mcknight in early April with new onset of daily severe HA following Right carotid endarterectomy   Dr. Mcknight was concerned about possible polymyalgia rheumatica and ordered labs. Sed rate was found to be elevated at 68  He had great results with prednisone taper and now states HA is very mild 2/10  Overall he is feeling much better  Essential hypertension  BP fluctuating after surgery, was having episodes of dizziness which have been much better in the last week. He is changing positions with caution. No syncope, chest pain  Diabetes (CMS-HCC) (HCC)  Last a1c 7.5  Glucose trending higher while on prednisone, now complete. Watching his diet  No polyuria, polydipsia       Current medicines (including changes today)  Current Outpatient Prescriptions   Medication Sig Dispense Refill   • therapeutic multivitamin-minerals (THERAGRAN-M) Tab Take 1 Tab by mouth every day.     • hydroxyurea (HYDREA) 500 MG Cap Take 1,500 mg by mouth every evening. Alternates taking 1000 mg one day and 1500 the next day     • apixaban (ELIQUIS) 5mg Tab Take 1 Tab by mouth 2 Times a Day. 180 Tab 1   • ticagrelor (BRILINTA) 90 MG Tab tablet Take 1 Tab by mouth 2 Times a Day. 180 Tab 3   • insulin glargine (LANTUS) 100 UNIT/ML Solution Inject 20 Units as instructed every evening.     • tamsulosin (FLOMAX) 0.4 MG capsule Take 0.4 mg by mouth every evening.     • finasteride (PROSCAR) 5 MG Tab Take 5 mg by mouth every day.     • colesevelam (WELCHOL) 625 MG Tab Take 625 mg by mouth 2 times a day, with meals.     • glipiZIDE (GLUCOTROL) 10 MG Tab Take 10 mg by mouth every day.     • predniSONE (DELTASONE) 20 MG Tab 1 QID X 1 WK, THEN TID X 1 WK, THEN BID X 1 WK, THEN DAILY X 1 WK. REFILLS PENDING RESPONSE 100 Tab 0   • gabapentin (NEURONTIN) 300 MG Cap Take 1 Cap  "by mouth 3 times a day. 30 Cap 1   • HYDROcodone-acetaminophen (NORCO) 5-325 MG Tab per tablet Take 1-2 Tabs by mouth every four hours as needed.       No current facility-administered medications for this visit.      He  has a past medical history of Anesthesia; Breath shortness; Cancer ([facility]); Diabetes ([facility]); Family history of punctured lung (2002); Heart attack (HCC); Hemorrhagic disorder ([facility]); High cholesterol; Ischemic cardiomyopathy; Kidney stones; Orthostatic hypotension (3/29/2018); Pain; Polycythemia vera(238.4); Stroke ([facility]) (2016); Urinary bladder disorder; and Vertigo.    ROS included above     Objective:     Blood pressure 126/82, pulse 97, temperature 36.6 °C (97.8 °F), resp. rate 20, height 1.84 m (6' 0.44\"), weight 98.2 kg (216 lb 9.6 oz), SpO2 100 %. Body mass index is 29.02 kg/m².     Physical Exam:  General: Alert, oriented in no acute distress.  Eye contact is good, speech is normal, affect calm  HEENT: eomi, perrl, Oral mucosa pink moist, no lesions. TMs gray with good landmarks bilaterally. No lymphadenopathy.  Lungs: clear to auscultation bilaterally, normal effort, no wheeze/ rhonchi/ rales.  CV: regular rate and rhythm, S1, S2, no murmur  Abdomen: soft, nontender, no hepatosplenomegaly  Ext: no edema, color normal, vascularity normal, temperature normal    Assessment and Plan:   The following treatment plan was discussed   1. Elevated sed rate- R/O PMR  Notes and labs from visit with Dr Mcknight reviewed. He has responded well to prednisone and is feeling much better at this point. He will notify me for recurrence of symptoms or further concerns. Referral placed for consultation with rheumatology  REFERRAL TO RHEUMATOLOGY    ALONZO SED RATE   2. Essential hypertension  stable   3. Uncontrolled type 2 diabetes mellitus with complication, with long-term current use of insulin ([facility])  stable  LIPID PROFILE    COMP METABOLIC PANEL    HEMOGLOBIN A1C   4. New daily persistent headache- R/O PMR; " trial steroids  See #1       Followup: 1 month, sooner as needed         Please note that this dictation was created using voice recognition software. I have worked with consultants from the vendor as well as technical experts from [facility] to optimize the interface. I have made every reasonable attempt to correct obvious errors, but I expect that there are errors of grammar and possibly content that I did not discover before finalizing the note.

## 2018-05-09 ENCOUNTER — OFFICE VISIT (OUTPATIENT)
Dept: CARDIOLOGY | Facility: MEDICAL CENTER | Age: 68
End: 2018-05-09
Payer: MEDICARE

## 2018-05-09 VITALS
WEIGHT: 219 LBS | HEART RATE: 93 BPM | BODY MASS INDEX: 29.66 KG/M2 | HEIGHT: 72 IN | DIASTOLIC BLOOD PRESSURE: 76 MMHG | SYSTOLIC BLOOD PRESSURE: 128 MMHG | OXYGEN SATURATION: 98 %

## 2018-05-09 DIAGNOSIS — I25.10 CORONARY ARTERY DISEASE INVOLVING NATIVE HEART WITHOUT ANGINA PECTORIS, UNSPECIFIED VESSEL OR LESION TYPE: ICD-10-CM

## 2018-05-09 DIAGNOSIS — R42 DIZZINESS: ICD-10-CM

## 2018-05-09 DIAGNOSIS — E78.49 OTHER HYPERLIPIDEMIA: ICD-10-CM

## 2018-05-09 DIAGNOSIS — I10 ESSENTIAL HYPERTENSION: ICD-10-CM

## 2018-05-09 DIAGNOSIS — I48.91 ATRIAL FIBRILLATION, UNSPECIFIED TYPE (HCC): ICD-10-CM

## 2018-05-09 PROCEDURE — 99215 OFFICE O/P EST HI 40 MIN: CPT | Performed by: INTERNAL MEDICINE

## 2018-05-09 ASSESSMENT — ENCOUNTER SYMPTOMS
ABDOMINAL PAIN: 0
PALPITATIONS: 0
HEADACHES: 1
ORTHOPNEA: 0
FALLS: 0
BRUISES/BLEEDS EASILY: 0
LOSS OF CONSCIOUSNESS: 0
PND: 0
DIZZINESS: 1
DEPRESSION: 0
SHORTNESS OF BREATH: 1

## 2018-05-09 NOTE — LETTER
[facility]   [address], [address]  [place] [address]  Phone: [phone]  Fax: [phone]              Francesco Schulte  1950    Encounter Date: 5/9/2018    Liliana Song M.D.          PROGRESS NOTE:  Subjective:   Francesco Schulte is a 67 y.o. male presenting to clinic for follow-up on his coronary artery disease.    Pertinent history:  Coronary artery disease  2008 - PCI to the LAD, POBA to the diagonal.   2014 - anterior STEMI. POBA to the diagonal. PCI to the proximal circumflex.  Restented again in 2014 after his Plavix was stopped for an injection and patient has recurrent MI.  2016 - recurrent MI. POBA to unknown vessel  2017 - anterior STEMI. Status post PCI to the proximal and mid LAD    Ischemic cardiomyopathy, ejection fraction 45%  Paroxysmal atrial fibrillation, diagnosed 2016. Intolerant of beta blockers  TIA vs. CVA in 2016  Polycythemia vera  Carotid stenosis status post right CEA in March 2018  Hypertension  Hyperlipidemia, intolerant to statins    Patient is very frustrated today. Reports having almost daily headaches. He was found to have significant carotid stenosis now status post CEA. There is also concern for possible polymyalgia rheumatica for which he was started on steroids recently which improved his headaches. Since being off of the steroids about a week ago his headaches have recurred, though not as severe.  His main concern right now is dizziness and shortness of breath along with palpitations when he goes from a sitting to a standing position. He reports associated tachycardia and hypotension with postural change. Interestingly though, if he starts exercising he is able to exercise without any issues. He can bike 8 miles without any symptoms.    He denies any recurrent anginal symptoms. He continues to be on his Brilinta.  Denies any heart failure symptoms.  No bleeding issues from the Eliquis.   His blood pressures have  "been mostly 120s systolic. On occasion 130s or higher when he reports feeling a lot better.    Past Medical History:   Diagnosis Date   • Anesthesia     resistant to pain meds   • Breath shortness    • Cancer (HCC)     polycythemia vera   • Diabetes (HCC)     insulin and oral meds   • Family history of punctured lung 2002    left lung   • Heart attack (HCC)    • Hemorrhagic disorder (HCC)     on blood thinners   • High cholesterol    • Ischemic cardiomyopathy    • Kidney stones     hx of kidney stones   • Orthostatic hypotension 3/29/2018   • Pain     headache pain   • Polycythemia vera(238.4)    • Stroke (HCC) 2016    r/t afib; eye issues resolved afterward   • Urinary bladder disorder     enlarged prostate, weak stream, difficulty urinating   • Vertigo      Past Surgical History:   Procedure Laterality Date   • CAROTID ENDARTERECTOMY Right 3/21/2018    Procedure: CAROTID ENDARTERECTOMY- W/EEG MONITORING;  Surgeon: Benny Morfin M.D.;  Location: SURGERY [facility];  Service: General   • APPENDECTOMY     • CATH PLACEMENT      right arm   • LAMINOTOMY      diskectomy; C4   • OTHER CARDIAC SURGERY      stents x 3     History reviewed. No pertinent family history.     History   Smoking Status   • Never Smoker   Smokeless Tobacco   • Never Used     Allergies   Allergen Reactions   • Beta Adrenergic Blockers Unspecified     dizziness   • Metformin Unspecified     \"Similar to a heart attack, I was hospitalized\"   • Simvastatin      Myalgias   • Statins [Hmg-Coa-R Inhibitors]      Muscle ache, joint pain     Outpatient Encounter Prescriptions as of 5/9/2018   Medication Sig Dispense Refill   • therapeutic multivitamin-minerals (THERAGRAN-M) Tab Take 1 Tab by mouth every day.     • hydroxyurea (HYDREA) 500 MG Cap Take 1,500 mg by mouth every evening. Alternates taking 1000 mg one day and 1500 the next day     • apixaban (ELIQUIS) 5mg Tab Take 1 Tab by mouth 2 Times a Day. 180 Tab 1   • ticagrelor (BRILINTA) 90 MG Tab " "tablet Take 1 Tab by mouth 2 Times a Day. 180 Tab 3   • insulin glargine (LANTUS) 100 UNIT/ML Solution Inject 20 Units as instructed every evening.     • tamsulosin (FLOMAX) 0.4 MG capsule Take 0.4 mg by mouth every evening.     • finasteride (PROSCAR) 5 MG Tab Take 5 mg by mouth every day.     • colesevelam (WELCHOL) 625 MG Tab Take 625 mg by mouth 2 times a day, with meals.     • glipiZIDE (GLUCOTROL) 10 MG Tab Take 10 mg by mouth every day.     • [DISCONTINUED] predniSONE (DELTASONE) 20 MG Tab 1 QID X 1 WK, THEN TID X 1 WK, THEN BID X 1 WK, THEN DAILY X 1 WK. REFILLS PENDING RESPONSE 100 Tab 0   • [DISCONTINUED] gabapentin (NEURONTIN) 300 MG Cap Take 1 Cap by mouth 3 times a day. 30 Cap 1   • HYDROcodone-acetaminophen (NORCO) 5-325 MG Tab per tablet Take 1-2 Tabs by mouth every four hours as needed.       No facility-administered encounter medications on file as of 5/9/2018.      Review of Systems   Constitutional: Negative for malaise/fatigue.   Respiratory: Positive for shortness of breath.    Cardiovascular: Negative for chest pain, palpitations, orthopnea, leg swelling and PND.   Gastrointestinal: Negative for abdominal pain.   Musculoskeletal: Negative for falls.   Skin: Negative.    Neurological: Positive for dizziness and headaches. Negative for loss of consciousness.   Endo/Heme/Allergies: Does not bruise/bleed easily.   Psychiatric/Behavioral: Negative for depression.   All other systems reviewed and are negative.       Objective:   /76   Pulse 93   Ht 1.84 m (6' 0.44\")   Wt 99.3 kg (219 lb)   SpO2 98%   BMI 29.34 kg/m²      Physical Exam   Constitutional: He is oriented to person, place, and time. No distress.   HENT:   Head: Normocephalic and atraumatic.   Eyes: Conjunctivae are normal.   Neck: Normal range of motion. Neck supple. No JVD present.   Cardiovascular: Normal rate, regular rhythm and normal heart sounds.  Exam reveals no gallop and no friction rub.    No murmur " heard.  Pulmonary/Chest: Effort normal and breath sounds normal. No respiratory distress. He has no wheezes. He has no rales.   Abdominal: Soft. There is no tenderness.   Musculoskeletal: He exhibits no edema.   Neurological: He is alert and oriented to person, place, and time.   Skin: Skin is warm and dry. He is not diaphoretic.   Psychiatric: He has a normal mood and affect.   Nursing note and vitals reviewed.     Echocardiogram performed in September 2017 showed a mildly reduced LV function with an EF of 45%. No significant changes from prior study.     Labs performed in May 2018 were reviewed and showed creatinine 0.8. .    Assessment:     1. Dizziness     2. Coronary artery disease involving native heart without angina pectoris, unspecified vessel or lesion type     3. Atrial fibrillation, unspecified type (HCC)     4. Essential hypertension     5. Other hyperlipidemia         Medical Decision Making:  Today's Assessment / Status / Plan:     Dizziness: Orthostatics were performed today. Blood pressure dropped about 18 mmHg with postural change. I have advised the patient to stay hydrated as much as possible. He is currently not on any antihypertensives. Will refer him for a tilt table study for further evaluation.    Coronary artery disease status post PCI:  Ischemic cardiomyopathy (EF 45%):  Patient continues to be free of anginal symptoms. Since he has been on polycythemia vera treatment, he has not had a recurrent MI. Euvolemic on exam. NYHA class difficult to assess due to current symptoms.  Continue Brilinta. Not on aspirin as he is on anticoagulation. Can consider discontinuation of Brilinta at the next visit.  Intolerant to statins and beta blockers.  Note on after reduction due to above symptoms.    Paroxysmal atrial fibrillation: Continues to maintain sinus rhythm. Intolerant to beta blockers. Continue Eliquis. No bleeding issues.    Hyperlipidemia: Patient is intolerant to statins. Continue  WelChol. LDL as above.    Return to clinic in 3 months or earlier if needed.    Thank you for allowing me to participate in the care of this patient. Please do not hesitate to contact me with any questions.    Liliana Song MD  Cardiologist  [facility]    PLEASE NOTE: This dictation was created using voice recognition software.         SATNAM HarmanRYeniN.  [address]  [address]  [place] [address]  VIA In Basket

## 2018-05-09 NOTE — PROGRESS NOTES
Subjective:   Francesco Schulte is a 67 y.o. male presenting to clinic for follow-up on his coronary artery disease.    Pertinent history:  Coronary artery disease  2008 - PCI to the LAD, POBA to the diagonal.   2014 - anterior STEMI. POBA to the diagonal. PCI to the proximal circumflex.  Restented again in 2014 after his Plavix was stopped for an injection and patient has recurrent MI.  2016 - recurrent MI. POBA to unknown vessel  2017 - anterior STEMI. Status post PCI to the proximal and mid LAD    Ischemic cardiomyopathy, ejection fraction 45%  Paroxysmal atrial fibrillation, diagnosed 2016. Intolerant of beta blockers  TIA vs. CVA in 2016  Polycythemia vera  Carotid stenosis status post right CEA in March 2018  Hypertension  Hyperlipidemia, intolerant to statins    Patient is very frustrated today. Reports having almost daily headaches. He was found to have significant carotid stenosis now status post CEA. There is also concern for possible polymyalgia rheumatica for which he was started on steroids recently which improved his headaches. Since being off of the steroids about a week ago his headaches have recurred, though not as severe.  His main concern right now is dizziness and shortness of breath along with palpitations when he goes from a sitting to a standing position. He reports associated tachycardia and hypotension with postural change. Interestingly though, if he starts exercising he is able to exercise without any issues. He can bike 8 miles without any symptoms.    He denies any recurrent anginal symptoms. He continues to be on his Brilinta.  Denies any heart failure symptoms.  No bleeding issues from the Eliquis.   His blood pressures have been mostly 120s systolic. On occasion 130s or higher when he reports feeling a lot better.    Past Medical History:   Diagnosis Date   • Anesthesia     resistant to pain meds   • Breath shortness    • Cancer (HCC)     polycythemia vera   • Diabetes (HCC)      "insulin and oral meds   • Family history of punctured lung 2002    left lung   • Heart attack (HCC)    • Hemorrhagic disorder (HCC)     on blood thinners   • High cholesterol    • Ischemic cardiomyopathy    • Kidney stones     hx of kidney stones   • Orthostatic hypotension 3/29/2018   • Pain     headache pain   • Polycythemia vera(238.4)    • Stroke (HCC) 2016    r/t afib; eye issues resolved afterward   • Urinary bladder disorder     enlarged prostate, weak stream, difficulty urinating   • Vertigo      Past Surgical History:   Procedure Laterality Date   • CAROTID ENDARTERECTOMY Right 3/21/2018    Procedure: CAROTID ENDARTERECTOMY- W/EEG MONITORING;  Surgeon: Benny Morfin M.D.;  Location: SURGERY [facility];  Service: General   • APPENDECTOMY     • CATH PLACEMENT      right arm   • LAMINOTOMY      diskectomy; C4   • OTHER CARDIAC SURGERY      stents x 3     History reviewed. No pertinent family history.     History   Smoking Status   • Never Smoker   Smokeless Tobacco   • Never Used     Allergies   Allergen Reactions   • Beta Adrenergic Blockers Unspecified     dizziness   • Metformin Unspecified     \"Similar to a heart attack, I was hospitalized\"   • Simvastatin      Myalgias   • Statins [Hmg-Coa-R Inhibitors]      Muscle ache, joint pain     Outpatient Encounter Prescriptions as of 5/9/2018   Medication Sig Dispense Refill   • therapeutic multivitamin-minerals (THERAGRAN-M) Tab Take 1 Tab by mouth every day.     • hydroxyurea (HYDREA) 500 MG Cap Take 1,500 mg by mouth every evening. Alternates taking 1000 mg one day and 1500 the next day     • apixaban (ELIQUIS) 5mg Tab Take 1 Tab by mouth 2 Times a Day. 180 Tab 1   • ticagrelor (BRILINTA) 90 MG Tab tablet Take 1 Tab by mouth 2 Times a Day. 180 Tab 3   • insulin glargine (LANTUS) 100 UNIT/ML Solution Inject 20 Units as instructed every evening.     • tamsulosin (FLOMAX) 0.4 MG capsule Take 0.4 mg by mouth every evening.     • finasteride (PROSCAR) 5 MG " "Tab Take 5 mg by mouth every day.     • colesevelam (WELCHOL) 625 MG Tab Take 625 mg by mouth 2 times a day, with meals.     • glipiZIDE (GLUCOTROL) 10 MG Tab Take 10 mg by mouth every day.     • [DISCONTINUED] predniSONE (DELTASONE) 20 MG Tab 1 QID X 1 WK, THEN TID X 1 WK, THEN BID X 1 WK, THEN DAILY X 1 WK. REFILLS PENDING RESPONSE 100 Tab 0   • [DISCONTINUED] gabapentin (NEURONTIN) 300 MG Cap Take 1 Cap by mouth 3 times a day. 30 Cap 1   • HYDROcodone-acetaminophen (NORCO) 5-325 MG Tab per tablet Take 1-2 Tabs by mouth every four hours as needed.       No facility-administered encounter medications on file as of 5/9/2018.      Review of Systems   Constitutional: Negative for malaise/fatigue.   Respiratory: Positive for shortness of breath.    Cardiovascular: Negative for chest pain, palpitations, orthopnea, leg swelling and PND.   Gastrointestinal: Negative for abdominal pain.   Musculoskeletal: Negative for falls.   Skin: Negative.    Neurological: Positive for dizziness and headaches. Negative for loss of consciousness.   Endo/Heme/Allergies: Does not bruise/bleed easily.   Psychiatric/Behavioral: Negative for depression.   All other systems reviewed and are negative.       Objective:   /76   Pulse 93   Ht 1.84 m (6' 0.44\")   Wt 99.3 kg (219 lb)   SpO2 98%   BMI 29.34 kg/m²     Physical Exam   Constitutional: He is oriented to person, place, and time. No distress.   HENT:   Head: Normocephalic and atraumatic.   Eyes: Conjunctivae are normal.   Neck: Normal range of motion. Neck supple. No JVD present.   Cardiovascular: Normal rate, regular rhythm and normal heart sounds.  Exam reveals no gallop and no friction rub.    No murmur heard.  Pulmonary/Chest: Effort normal and breath sounds normal. No respiratory distress. He has no wheezes. He has no rales.   Abdominal: Soft. There is no tenderness.   Musculoskeletal: He exhibits no edema.   Neurological: He is alert and oriented to person, place, and " time.   Skin: Skin is warm and dry. He is not diaphoretic.   Psychiatric: He has a normal mood and affect.   Nursing note and vitals reviewed.     Echocardiogram performed in September 2017 showed a mildly reduced LV function with an EF of 45%. No significant changes from prior study.     Labs performed in May 2018 were reviewed and showed creatinine 0.8. .    Assessment:     1. Dizziness     2. Coronary artery disease involving native heart without angina pectoris, unspecified vessel or lesion type     3. Atrial fibrillation, unspecified type (HCC)     4. Essential hypertension     5. Other hyperlipidemia         Medical Decision Making:  Today's Assessment / Status / Plan:     Dizziness: Orthostatics were performed today. Blood pressure dropped about 18 mmHg with postural change. I have advised the patient to stay hydrated as much as possible. He is currently not on any antihypertensives. Will refer him for a tilt table study for further evaluation.    Coronary artery disease status post PCI:  Ischemic cardiomyopathy (EF 45%):  Patient continues to be free of anginal symptoms. Since he has been on polycythemia vera treatment, he has not had a recurrent MI. Euvolemic on exam. NYHA class difficult to assess due to current symptoms.  Continue Brilinta. Not on aspirin as he is on anticoagulation. Can consider discontinuation of Brilinta at the next visit.  Intolerant to statins and beta blockers.  Note on after reduction due to above symptoms.    Paroxysmal atrial fibrillation: Continues to maintain sinus rhythm. Intolerant to beta blockers. Continue Eliquis. No bleeding issues.    Hyperlipidemia: Patient is intolerant to statins. Continue WelChol. LDL as above.    Return to clinic in 3 months or earlier if needed.    Thank you for allowing me to participate in the care of this patient. Please do not hesitate to contact me with any questions.    Liliana Song MD  Cardiologist  [facility]    PLEASE NOTE: This dictation was created using voice recognition software.

## 2018-05-22 ENCOUNTER — HOSPITAL ENCOUNTER (OUTPATIENT)
Dept: CARDIOLOGY | Facility: MEDICAL CENTER | Age: 68
End: 2018-05-22
Attending: INTERNAL MEDICINE
Payer: MEDICARE

## 2018-05-22 NOTE — PROGRESS NOTES
"Procedure Note    Tilt Table Test    Baseline parameters: resting heart rate 66 resting blood pressure 149/87  Resting ECG:  sinus rhythm with incomplete left bundle branch block    Procedure was 20 minutes of passive tilt    With passive tilt the heart rate increased to 100 and then decreased to 88 at minute 15. The blood pressure slowly decreased over time to 66/50 the patient reported \"getting really bad\"  Loss of consciousness was not demonstrated  ECG did not dramatically change over the course of passive tilt      Overall conclusion:    With passive tilt the patient demonstrated no significant change in heart rate but significant drop in blood pressure to 66/50 and reported feeling \"really bad\"    Electronically signed: Ken Melendez MD PhD [facility]  Cardiologist [facility]    5/22/2018  13:54  "

## 2018-05-30 ENCOUNTER — HOSPITAL ENCOUNTER (OUTPATIENT)
Dept: LAB | Facility: MEDICAL CENTER | Age: 68
End: 2018-05-30
Attending: INTERNAL MEDICINE
Payer: MEDICARE

## 2018-05-30 LAB
CRP SERPL HS-MCNC: 0.08 MG/DL (ref 0–0.75)
ERYTHROCYTE [SEDIMENTATION RATE] IN BLOOD BY WESTERGREN METHOD: 49 MM/HOUR (ref 0–20)

## 2018-05-30 PROCEDURE — 85652 RBC SED RATE AUTOMATED: CPT

## 2018-05-30 PROCEDURE — 86140 C-REACTIVE PROTEIN: CPT

## 2018-05-30 PROCEDURE — 36415 COLL VENOUS BLD VENIPUNCTURE: CPT

## 2018-06-13 ENCOUNTER — TELEPHONE (OUTPATIENT)
Dept: CARDIOLOGY | Facility: MEDICAL CENTER | Age: 68
End: 2018-06-13

## 2018-06-13 NOTE — TELEPHONE ENCOUNTER
Fax received from [facility], Dr. Rajendra Aguilar. Patient is to be scheduled for a right temporal artery biopsy. Requesting cardiac clearance/instructions to proceed.     To Dr. Song, please advise. Last seen 5/9, pt is taking Brilinta and Eliquis.     Fax#[phone]

## 2018-06-13 NOTE — LETTER
PROCEDURE/SURGERY CLEARANCE FORM      Encounter Date: 6/13/2018    Patient: Francesco Schulte  YOB: 1950    CARDIOLOGIST:  Dr. Liliana Song   REFERRING DOCTOR:  Dr. Rajendra Aguilar  [facility] fax# [phone]    Patient is okay to proceed with planned procedure. Would recommend continuation of current antiplatelet therapy.     Overall low to moderate risk for cardiovascular complications.                                          Additional comments: Please call our office with any questions or concerns. Thank you.                 MD Flaco Song MD

## 2018-06-15 NOTE — TELEPHONE ENCOUNTER
Call received from Jojo at [facility]. She just wanted to check on clearance. Reassured that information has been sent to Dr. Song. Jojo states that biopsy is urgent due to pt loosing his vision. Reassured that I should have a response by Monday. Jojo appreciative of assistance.

## 2018-06-18 ENCOUNTER — TELEPHONE (OUTPATIENT)
Dept: CARDIOLOGY | Facility: MEDICAL CENTER | Age: 68
End: 2018-06-18

## 2018-06-18 NOTE — TELEPHONE ENCOUNTER
Attempted to call pt to discuss. No answer, l/m for pt to please call back.    1300: S/w pt about tilt-table and recommendations per Dr. Song. He notes that results were discussed after test so he is aware about increasing his fluid and salt intake. Pt notes that since starting prednisone per Dr. Aguilar, his headaches have improved and his dizziness has decreased. He is appreciative of assistance and denies further needs.     ----- Message from Liliana Song M.D. sent at 6/18/2018 11:11 AM PDT -----  Please have the patient increase his fluid intake and also increase his salt intake.  Ideally he needs a low-salt diet, however with his tilt table test results and his symptoms we need to increase his blood pressure when he is standing up.  He should also use compression stockings on a daily basis.    Thank you     ----- Message -----  From: Ken Melendez M.D.  Sent: 5/22/2018   1:55 PM  To: Liliana Song M.D.

## 2018-06-18 NOTE — TELEPHONE ENCOUNTER
Clearance per Dr. Song faxed to [facility] surgical specialists, Dr. Aguilar @[phone].    Notified pt.    Liliana Song M.D.  Manju Yap R.N.   Caller: Unspecified (5 days ago, 10:10 AM)      Okay to proceed with planned procedure. Would recommend continuation of current therapy.   Overall low to moderate risk for cardiovascular complications.

## 2018-06-22 DIAGNOSIS — Z01.812 PRE-OPERATIVE LABORATORY EXAMINATION: ICD-10-CM

## 2018-06-22 LAB
ANION GAP SERPL CALC-SCNC: 8 MMOL/L (ref 0–11.9)
BUN SERPL-MCNC: 17 MG/DL (ref 8–22)
CALCIUM SERPL-MCNC: 9.3 MG/DL (ref 8.5–10.5)
CHLORIDE SERPL-SCNC: 107 MMOL/L (ref 96–112)
CO2 SERPL-SCNC: 23 MMOL/L (ref 20–33)
CREAT SERPL-MCNC: 1.01 MG/DL (ref 0.5–1.4)
ERYTHROCYTE [DISTWIDTH] IN BLOOD BY AUTOMATED COUNT: 62.1 FL (ref 35.9–50)
EST. AVERAGE GLUCOSE BLD GHB EST-MCNC: 203 MG/DL
GLUCOSE SERPL-MCNC: 276 MG/DL (ref 65–99)
HBA1C MFR BLD: 8.7 % (ref 0–5.6)
HCT VFR BLD AUTO: 42.9 % (ref 42–52)
HGB BLD-MCNC: 13.5 G/DL (ref 14–18)
MCH RBC QN AUTO: 32.1 PG (ref 27–33)
MCHC RBC AUTO-ENTMCNC: 31.5 G/DL (ref 33.7–35.3)
MCV RBC AUTO: 101.9 FL (ref 81.4–97.8)
PLATELET # BLD AUTO: 177 K/UL (ref 164–446)
PMV BLD AUTO: 12 FL (ref 9–12.9)
POTASSIUM SERPL-SCNC: 3.8 MMOL/L (ref 3.6–5.5)
RBC # BLD AUTO: 4.21 M/UL (ref 4.7–6.1)
SODIUM SERPL-SCNC: 138 MMOL/L (ref 135–145)
WBC # BLD AUTO: 4.1 K/UL (ref 4.8–10.8)

## 2018-06-22 PROCEDURE — 36415 COLL VENOUS BLD VENIPUNCTURE: CPT

## 2018-06-22 PROCEDURE — 80048 BASIC METABOLIC PNL TOTAL CA: CPT

## 2018-06-22 PROCEDURE — 85027 COMPLETE CBC AUTOMATED: CPT

## 2018-06-22 PROCEDURE — 83036 HEMOGLOBIN GLYCOSYLATED A1C: CPT

## 2018-06-26 ENCOUNTER — HOSPITAL ENCOUNTER (OUTPATIENT)
Facility: MEDICAL CENTER | Age: 68
End: 2018-06-26
Attending: SURGERY | Admitting: SURGERY
Payer: MEDICARE

## 2018-06-26 VITALS
OXYGEN SATURATION: 98 % | TEMPERATURE: 97.8 F | HEIGHT: 75 IN | RESPIRATION RATE: 16 BRPM | BODY MASS INDEX: 26.89 KG/M2 | HEART RATE: 78 BPM | WEIGHT: 216.27 LBS

## 2018-06-26 DIAGNOSIS — G89.18 POST-OPERATIVE PAIN: ICD-10-CM

## 2018-06-26 LAB — GLUCOSE BLD-MCNC: 197 MG/DL (ref 65–99)

## 2018-06-26 PROCEDURE — 501838 HCHG SUTURE GENERAL: Performed by: SURGERY

## 2018-06-26 PROCEDURE — 82962 GLUCOSE BLOOD TEST: CPT

## 2018-06-26 PROCEDURE — 700102 HCHG RX REV CODE 250 W/ 637 OVERRIDE(OP)

## 2018-06-26 PROCEDURE — 700101 HCHG RX REV CODE 250: Mod: JW

## 2018-06-26 PROCEDURE — 700111 HCHG RX REV CODE 636 W/ 250 OVERRIDE (IP)

## 2018-06-26 PROCEDURE — 160036 HCHG PACU - EA ADDL 30 MINS PHASE I: Performed by: SURGERY

## 2018-06-26 PROCEDURE — A9270 NON-COVERED ITEM OR SERVICE: HCPCS

## 2018-06-26 PROCEDURE — 160035 HCHG PACU - 1ST 60 MINS PHASE I: Performed by: SURGERY

## 2018-06-26 PROCEDURE — A6402 STERILE GAUZE <= 16 SQ IN: HCPCS | Performed by: SURGERY

## 2018-06-26 PROCEDURE — 160039 HCHG SURGERY MINUTES - EA ADDL 1 MIN LEVEL 3: Performed by: SURGERY

## 2018-06-26 PROCEDURE — 88305 TISSUE EXAM BY PATHOLOGIST: CPT

## 2018-06-26 PROCEDURE — 160009 HCHG ANES TIME/MIN: Performed by: SURGERY

## 2018-06-26 PROCEDURE — 700101 HCHG RX REV CODE 250

## 2018-06-26 PROCEDURE — 160002 HCHG RECOVERY MINUTES (STAT): Performed by: SURGERY

## 2018-06-26 PROCEDURE — 160048 HCHG OR STATISTICAL LEVEL 1-5: Performed by: SURGERY

## 2018-06-26 PROCEDURE — 160028 HCHG SURGERY MINUTES - 1ST 30 MINS LEVEL 3: Performed by: SURGERY

## 2018-06-26 PROCEDURE — 500126 HCHG BOVIE, NEEDLE TIP: Performed by: SURGERY

## 2018-06-26 RX ORDER — HYDROCODONE BITARTRATE AND ACETAMINOPHEN 5; 325 MG/1; MG/1
1-2 TABLET ORAL EVERY 4 HOURS PRN
Qty: 20 TAB | Refills: 0 | Status: SHIPPED | OUTPATIENT
Start: 2018-06-26 | End: 2018-06-30

## 2018-06-26 RX ORDER — ONDANSETRON 2 MG/ML
4 INJECTION INTRAMUSCULAR; INTRAVENOUS EVERY 4 HOURS PRN
Status: DISCONTINUED | OUTPATIENT
Start: 2018-06-26 | End: 2018-06-26 | Stop reason: HOSPADM

## 2018-06-26 RX ORDER — KETOROLAC TROMETHAMINE 30 MG/ML
INJECTION, SOLUTION INTRAMUSCULAR; INTRAVENOUS
Status: COMPLETED
Start: 2018-06-26 | End: 2018-06-26

## 2018-06-26 RX ORDER — HYDROMORPHONE HYDROCHLORIDE 2 MG/ML
INJECTION, SOLUTION INTRAMUSCULAR; INTRAVENOUS; SUBCUTANEOUS
Status: COMPLETED
Start: 2018-06-26 | End: 2018-06-26

## 2018-06-26 RX ORDER — SODIUM CHLORIDE, SODIUM LACTATE, POTASSIUM CHLORIDE, CALCIUM CHLORIDE 600; 310; 30; 20 MG/100ML; MG/100ML; MG/100ML; MG/100ML
1000 INJECTION, SOLUTION INTRAVENOUS
Status: COMPLETED | OUTPATIENT
Start: 2018-06-26 | End: 2018-06-26

## 2018-06-26 RX ORDER — BUPIVACAINE HYDROCHLORIDE AND EPINEPHRINE 5; 5 MG/ML; UG/ML
INJECTION, SOLUTION EPIDURAL; INTRACAUDAL; PERINEURAL
Status: DISCONTINUED
Start: 2018-06-26 | End: 2018-06-26 | Stop reason: HOSPADM

## 2018-06-26 RX ORDER — BUPIVACAINE HYDROCHLORIDE AND EPINEPHRINE 5; 5 MG/ML; UG/ML
INJECTION, SOLUTION EPIDURAL; INTRACAUDAL; PERINEURAL
Status: DISCONTINUED | OUTPATIENT
Start: 2018-06-26 | End: 2018-06-26 | Stop reason: HOSPADM

## 2018-06-26 RX ADMIN — HYDROCODONE BITARTRATE AND ACETAMINOPHEN 30 ML: 2.5; 108 SOLUTION ORAL at 11:45

## 2018-06-26 RX ADMIN — KETOROLAC TROMETHAMINE 30 MG: 30 INJECTION, SOLUTION INTRAMUSCULAR at 12:00

## 2018-06-26 RX ADMIN — SODIUM CHLORIDE, SODIUM LACTATE, POTASSIUM CHLORIDE, CALCIUM CHLORIDE 1000 ML: 600; 310; 30; 20 INJECTION, SOLUTION INTRAVENOUS at 09:30

## 2018-06-26 RX ADMIN — HYDROMORPHONE HYDROCHLORIDE 1 MG: 2 INJECTION INTRAMUSCULAR; INTRAVENOUS; SUBCUTANEOUS at 12:15

## 2018-06-26 RX ADMIN — HYDROMORPHONE HYDROCHLORIDE 1 MG: 2 INJECTION INTRAMUSCULAR; INTRAVENOUS; SUBCUTANEOUS at 12:04

## 2018-06-26 RX ADMIN — FENTANYL CITRATE 100 MCG: 50 INJECTION, SOLUTION INTRAMUSCULAR; INTRAVENOUS at 11:40

## 2018-06-26 ASSESSMENT — PAIN SCALES - GENERAL
PAINLEVEL_OUTOF10: 2
PAINLEVEL_OUTOF10: 1
PAINLEVEL_OUTOF10: 1
PAINLEVEL_OUTOF10: 7
PAINLEVEL_OUTOF10: 1
PAINLEVEL_OUTOF10: 7
PAINLEVEL_OUTOF10: 1
PAINLEVEL_OUTOF10: 6
PAINLEVEL_OUTOF10: 10
PAINLEVEL_OUTOF10: 1
PAINLEVEL_OUTOF10: 0
PAINLEVEL_OUTOF10: 1
PAINLEVEL_OUTOF10: 5
PAINLEVEL_OUTOF10: 6

## 2018-06-26 NOTE — OP REPORT
DATE OF SERVICE:  06/26/2018    SURGEON:  Rajendra Aguilar MD    PREOPERATIVE DIAGNOSIS:  Possible temporal arteritis.    POSTOPERATIVE DIAGNOSIS:  Possible temporal arteritis.    PROCEDURE PERFORMED:  Temporal artery biopsy.    ANESTHESIA:  General LMA anesthesia.    ANESTHESIOLOGIST:  Dr. Conner Duque    INDICATIONS:  A 67-year-old man with possible temporal arteritis.  A temporal   artery biopsy was requested.    PROCEDURE AS FOLLOWS:  Procedure was discussed in detail with the patient   including the risk of bleeding, infection, vascular injury, nerve injury.  He   understood all the above and wished to proceed.  He was placed under   anesthesia by Dr. Duque.  His right face, anterior to the ear was shaved.    The artery was difficult to palpate and was identified with a Doppler.    Incision was made.  Dissection proceeded.  The artery was identified,   dissected away from surrounding connective tissue.  A length of the artery was   dissected free and controlled proximally and distally.  The artery was   divided and a specimen of at least a centimeter in length was sent for   pathologic evaluation.  Some bleeding was encountered, which was controlled   with a 3-0 silk suture.  After assuring hemostasis, the incision was closed in   2 layers with 3-0 Vicryl and a 4-0 Monocryl.  Sterile dressings were placed.    The patient tolerated the procedure well without apparent complication.    Final counts were reported as correct.       ____________________________________     MD CATA ALVARADO / NTS    DD:  06/26/2018 11:14:33  DT:  06/26/2018 11:24:11    D#:  [id number]  Job#:  [id number]    cc: AUBREE ANDERSON MD

## 2018-06-26 NOTE — DISCHARGE INSTRUCTIONS
ACTIVITY: Rest and take it easy for the first 24 hours.  A responsible adult is recommended to remain with you during that time.  It is normal to feel sleepy.  We encourage you to not do anything that requires balance, judgment or coordination.    MILD FLU-LIKE SYMPTOMS ARE NORMAL. YOU MAY EXPERIENCE GENERALIZED MUSCLE ACHES, THROAT IRRITATION, HEADACHE AND/OR SOME NAUSEA.    FOR 24 HOURS DO NOT:  Drive, operate machinery or run household appliances.  Drink beer or alcoholic beverages.   Make important decisions or sign legal documents.    SPECIAL INSTRUCTIONS: keep dressing clean and dry.    DIET: To avoid nausea, slowly advance diet as tolerated, avoiding spicy or greasy foods for the first day.  Add more substantial food to your diet according to your physician's instructions.  Babies can be fed formula or breast milk as soon as they are hungry.  INCREASE FLUIDS AND FIBER TO AVOID CONSTIPATION.    SURGICAL DRESSING/BATHING: ok to shower but keep incision clean and dry.    FOLLOW-UP APPOINTMENT:  A follow-up appointment should be arranged with your doctor; call to schedule.    You should CALL YOUR PHYSICIAN if you develop:  Fever greater than 101 degrees F.  Pain not relieved by medication, or persistent nausea or vomiting.  Excessive bleeding (blood soaking through dressing) or unexpected drainage from the wound.  Extreme redness or swelling around the incision site, drainage of pus or foul smelling drainage.  Inability to urinate or empty your bladder within 8 hours.  Problems with breathing or chest pain.    You should call 911 if you develop problems with breathing or chest pain.  If you are unable to contact your doctor or surgical center, you should go to the nearest emergency room or urgent care center.  Physician's telephone #:[phone]    If any questions arise, call your doctor.  If your doctor is not available, please feel free to call the Surgical Center at [phone].  The Center is open  Monday through Friday from 7AM to 7PM.  You can also call the HEALTH HOTLINE open 24 hours/day, 7 days/week and speak to a nurse at [phone], or toll free at [phone].    A registered nurse may call you a few days after your surgery to see how you are doing after your procedure.    MEDICATIONS: Resume taking daily medication.  Take prescribed pain medication with food.  If no medication is prescribed, you may take non-aspirin pain medication if needed.  PAIN MEDICATION CAN BE VERY CONSTIPATING.  Take a stool softener or laxative such as senokot, pericolace, or milk of magnesia if needed.    Prescription given for Hydrocodone.  Last pain medication given at 11:30am.    If your physician has prescribed pain medication that includes Acetaminophen (Tylenol), do not take additional Acetaminophen (Tylenol) while taking the prescribed medication.    Depression / Suicide Risk    As you are discharged from this [facility] include 234 beds at the [facility] facility, it is important to learn how to keep safe from harming yourself.    Recognize the warning signs:  · Abrupt changes in personality, positive or negative- including increase in energy   · Giving away possessions  · Change in eating patterns- significant weight changes-  positive or negative  · Change in sleeping patterns- unable to sleep or sleeping all the time   · Unwillingness or inability to communicate  · Depression  · Unusual sadness, discouragement and loneliness  · Talk of wanting to die  · Neglect of personal appearance   · Rebelliousness- reckless behavior  · Withdrawal from people/activities they love  · Confusion- inability to concentrate     If you or a loved one observes any of these behaviors or has concerns about self-harm, here's what you can do:  · Talk about it- your feelings and reasons for harming yourself  · Remove any means that you might use to hurt yourself (examples: pills, rope, extension cords, firearm)  · Get professional help from the community (Mental  Health, Substance Abuse, psychological counseling)  · Do not be alone:Call your Safe Contact- someone whom you trust who will be there for you.  · Call your local CRISIS HOTLINE 775-0028 or 170-294-4875  · Call your local Children's Mobile Crisis Response Team [place] (733) 302-5089 or www.[employer].TV  · Call the toll free National Suicide Prevention Hotlines   · National Suicide Prevention Lifeline 960-686-NHTT (3446)  · National Hope Line Network 800-SUICIDE (415-3533)

## 2018-06-26 NOTE — OR NURSING
"1111-Received from OR via [facility]. S/P-R temporal artery biopsy.  Bedside report received from RN. And Anesthesiologist.    1130-hydrocodone given for headache. 6/10.    1140-fent. Given.    1155-ketorolac given for increase headache. bp going up. Pt. Shaking, holding head. States, \"this happens everytime I have this kind of surgery\".    1204-cont. To complain of headache, 7/10 \"climbing\". Dilaudid given.    1215-pain 10/10 dilaudid repeated. Assisted with urinal.     1230-pain level down to 7/10.    1239-pain down to 4/10.    1300-pain level 1/10. Wife brought to bedside.    1415-meets discharge criteria. Iv removed. Instructions explained to wife  All questions answered. Discharged home with responsible party. Escorted to car via wheelchair.  "

## 2018-06-26 NOTE — OR SURGEON
Immediate Post OP Note    PreOp Diagnosis: possible temporal arteritis    PostOp Diagnosis: possible temporal arteritis    Procedure(s):  TEMPORAL ARTERY BIOPSY - Wound Class: Clean    Surgeon(s):  Rajendra Aguilar M.D.    Anesthesiologist/Type of Anesthesia:  Anesthesiologist: Blayne Duque III, M.D./General    Surgical Staff:  Circulator: Erwin Hall R.N.; Jazzmine Hinojosa R.N.  Scrub Person: Kimmie Ferreira    Specimens removed if any:  Temporal artery    Estimated Blood Loss: 25 cc    Findings: temporal artery    Complications: none        6/26/2018 11:07 AM Rajendra Aguilar M.D.

## 2018-07-12 ENCOUNTER — APPOINTMENT (OUTPATIENT)
Dept: MEDICAL GROUP | Facility: MEDICAL CENTER | Age: 68
End: 2018-07-12
Payer: MEDICARE

## 2018-07-24 ENCOUNTER — OFFICE VISIT (OUTPATIENT)
Dept: CARDIOLOGY | Facility: MEDICAL CENTER | Age: 68
End: 2018-07-24
Payer: MEDICARE

## 2018-07-24 VITALS
SYSTOLIC BLOOD PRESSURE: 102 MMHG | WEIGHT: 219 LBS | BODY MASS INDEX: 27.23 KG/M2 | OXYGEN SATURATION: 96 % | HEART RATE: 92 BPM | DIASTOLIC BLOOD PRESSURE: 60 MMHG | HEIGHT: 75 IN

## 2018-07-24 DIAGNOSIS — R42 DIZZINESS: ICD-10-CM

## 2018-07-24 DIAGNOSIS — I48.0 PAROXYSMAL ATRIAL FIBRILLATION (HCC): ICD-10-CM

## 2018-07-24 DIAGNOSIS — E78.5 DYSLIPIDEMIA: ICD-10-CM

## 2018-07-24 DIAGNOSIS — I25.10 CORONARY ARTERY DISEASE INVOLVING NATIVE CORONARY ARTERY OF NATIVE HEART WITHOUT ANGINA PECTORIS: ICD-10-CM

## 2018-07-24 DIAGNOSIS — I50.22 CHRONIC SYSTOLIC CHF (CONGESTIVE HEART FAILURE) (HCC): ICD-10-CM

## 2018-07-24 DIAGNOSIS — I95.1 ORTHOSTATIC HYPOTENSION: ICD-10-CM

## 2018-07-24 PROCEDURE — 99215 OFFICE O/P EST HI 40 MIN: CPT | Performed by: INTERNAL MEDICINE

## 2018-07-24 ASSESSMENT — ENCOUNTER SYMPTOMS
HEADACHES: 0
BRUISES/BLEEDS EASILY: 0
SHORTNESS OF BREATH: 0
ORTHOPNEA: 0
FALLS: 0
LOSS OF CONSCIOUSNESS: 0
PALPITATIONS: 0
ABDOMINAL PAIN: 0
DIZZINESS: 1
PND: 0
DEPRESSION: 0

## 2018-07-24 NOTE — PROGRESS NOTES
Subjective:   Francesco Schulte is a 67 y.o. male presenting for follow-up on his coronary artery disease.    Pertinent history:  Coronary artery disease  2008 - PCI to the LAD, POBA to the diagonal.   2014 - anterior STEMI. POBA to the diagonal. PCI to the proximal circumflex.  Restented again in 2014 after his Plavix was stopped for an injection and patient has recurrent MI.  2016 - recurrent MI. POBA to unknown vessel  2017 - anterior STEMI. Status post PCI to the proximal and mid LAD    Ischemic cardiomyopathy, ejection fraction 45%  Paroxysmal atrial fibrillation, diagnosed 2016. Intolerant of beta blockers  TIA vs. CVA in 2016  Polycythemia vera, essential thrombocytosis  Carotid stenosis status post right CEA in March 2018  Hypertension  Hyperlipidemia, intolerant to statins      Patient has been on prednisone for 3 months now.  Reports that his headaches have essentially resolved.  His prednisone was just discontinued yesterday.  He is hoping that his symptoms will now be resolved.  After his last visit, he underwent a tilt table study in which his blood pressure dropped to systolic 60s without any associated change in heart rate.  Since then he has been staying hydrated and drinks about 48-64 ounces a day.  He is also been increasing his salt intake as much as possible.  Reports that his dizziness is still ongoing however it is improved.    Denies any anginal symptoms.  Continues to be on Brilinta.  No heart failure symptoms.  Denies any palpitations.  Still on Eliquis.  No bleeding issues.  Blood pressures have been mostly 110s systolic.  Continues to be on WelChol which is tolerating well.    Past Medical History:   Diagnosis Date   • Anesthesia     resistant to pain meds   • Cancer (HCC)     polycythemia vera   • Diabetes (HCC)     insulin and oral meds   • Family history of punctured lung 2002    left lung   • Heart attack (HCC) 03/2017   • Hemorrhagic disorder (HCC)     on blood thinners   • High  "cholesterol    • Ischemic cardiomyopathy    • Kidney stones     hx of kidney stones   • Orthostatic hypotension 3/29/2018   • Pain     headache pain   • Polycythemia vera(238.4)    • Stroke (HCC) 2016    r/t afib; eye issues resolved afterward   • Urinary bladder disorder     enlarged prostate, weak stream, difficulty urinating   • Vertigo      Past Surgical History:   Procedure Laterality Date   • TEMPORAL ARTERY BIOPSY Right 6/26/2018    Procedure: TEMPORAL ARTERY BIOPSY;  Surgeon: Rajendra Aguilar M.D.;  Location: SURGERY SAME DAY [facility] ORS;  Service: General   • CAROTID ENDARTERECTOMY Right 3/21/2018    Procedure: CAROTID ENDARTERECTOMY- W/EEG MONITORING;  Surgeon: Benny Morfin M.D.;  Location: SURGERY [facility] ORS;  Service: General   • APPENDECTOMY     • CATH PLACEMENT      right arm   • LAMINOTOMY      diskectomy; C4   • OTHER CARDIAC SURGERY      stents x 3     History reviewed. No pertinent family history.     History   Smoking Status   • Never Smoker   Smokeless Tobacco   • Never Used     Allergies   Allergen Reactions   • Beta Adrenergic Blockers Unspecified     dizziness   • Metformin Unspecified     \"Similar to a heart attack, I was hospitalized\"   • Simvastatin      Myalgias   • Statins [Hmg-Coa-R Inhibitors]      Muscle ache, joint pain     Outpatient Encounter Prescriptions as of 7/24/2018   Medication Sig Dispense Refill   • PREDNISONE PO Take 60 mg by mouth.     • therapeutic multivitamin-minerals (THERAGRAN-M) Tab Take 1 Tab by mouth every day.     • apixaban (ELIQUIS) 5mg Tab Take 1 Tab by mouth 2 Times a Day. 180 Tab 1   • ticagrelor (BRILINTA) 90 MG Tab tablet Take 1 Tab by mouth 2 Times a Day. 180 Tab 3   • insulin glargine (LANTUS) 100 UNIT/ML Solution Inject 10 Units as instructed every evening.     • tamsulosin (FLOMAX) 0.4 MG capsule Take 0.4 mg by mouth every evening.     • finasteride (PROSCAR) 5 MG Tab Take 5 mg by mouth every day.     • colesevelam (WELCHOL) 625 MG Tab Take 625 " "mg by mouth 2 times a day, with meals.     • glipiZIDE (GLUCOTROL) 10 MG Tab Take 10 mg by mouth every day.     • hydroxyurea (HYDREA) 500 MG Cap Take 1,500 mg by mouth every evening. Alternates taking 1000 mg one day and 1500 the next day       No facility-administered encounter medications on file as of 7/24/2018.      Review of Systems   Constitutional: Negative for malaise/fatigue.   Respiratory: Negative for shortness of breath.    Cardiovascular: Negative for chest pain, palpitations, orthopnea, leg swelling and PND.   Gastrointestinal: Negative for abdominal pain.   Musculoskeletal: Negative for falls.   Skin: Negative.    Neurological: Positive for dizziness. Negative for loss of consciousness and headaches.   Endo/Heme/Allergies: Does not bruise/bleed easily.   Psychiatric/Behavioral: Negative for depression.   All other systems reviewed and are negative.       Objective:   /60   Pulse 92   Ht 1.905 m (6' 3\")   Wt 99.3 kg (219 lb)   SpO2 96%   BMI 27.37 kg/m²     Physical Exam   Constitutional: He is oriented to person, place, and time. No distress.   HENT:   Head: Normocephalic and atraumatic.   Eyes: Conjunctivae are normal.   Neck: Normal range of motion. Neck supple. No JVD present.   Cardiovascular: Normal rate, regular rhythm and normal heart sounds.  Exam reveals no gallop and no friction rub.    No murmur heard.  Pulmonary/Chest: Effort normal and breath sounds normal. No respiratory distress. He has no wheezes. He has no rales.   Abdominal: Soft. He exhibits no distension. There is no tenderness.   Musculoskeletal: He exhibits no edema.   Neurological: He is alert and oriented to person, place, and time.   Skin: Skin is warm and dry. He is not diaphoretic.   Psychiatric: He has a normal mood and affect.   Nursing note and vitals reviewed.     Echocardiogram performed in September 2017 showed a mildly reduced LV function with an EF of 45%. No significant changes from prior study.     Labs " performed in June 2018 were reviewed and showed hemoglobin 13.5.  Platelets 177.    Assessment:     1. Dyslipidemia     2. Orthostatic hypotension     3. Coronary artery disease involving native coronary artery of native heart without angina pectoris     4. Chronic systolic CHF (congestive heart failure) (HCC)     5. Paroxysmal atrial fibrillation (HCC)     6. Dizziness         Medical Decision Making:  Today's Assessment / Status / Plan:     Dizziness: Tilt table study showed concern for possible orthostasis but no change in heart rate was noted.  Patient was advised to increase his salt and fluid intake further.  Since his prednisone was just discontinued recently, for now would recommend ongoing monitoring.  If his symptoms and/or his blood pressure fail to improve, will consider addition of medications to help with his symptoms.    Coronary artery disease status post PCI:  Ischemic cardiomyopathy (EF 45%):  Patient is free of anginal heart failure symptoms.  Since he is over 1 year out from his PCI, will discontinue Brilinta today and instead start him on a baby aspirin every day, especially since he is on Eliquis.  I spoke with his hematologist Dr. Sims personally today regarding the above.  Due to his underlying polycythemia vera/essential thrombocytosis which is now well controlled on hydroxyurea, per Dr. Sims aspirin alone is needed for him from a hematology standpoint.  He is not on any other cardioprotective medications like beta blockers or ACE inhibitors due to hypotension/dizziness as detailed above.    Hyperlipidemia: Patient is intolerant to statins.  Continue WelChol at current dose.    Paroxysmal atrial fibrillation: Currently in sinus.  Not on any AV leslie blocking agents.  Continue Eliquis.  No bleeding issues.      Return to clinic in 2 months or earlier if needed.    Thank you for allowing me to participate in the care of this patient. Please do not hesitate to contact me with any  questions.    Liliana Song MD  Cardiologist  [facility]    PLEASE NOTE: This dictation was created using voice recognition software.

## 2018-07-24 NOTE — LETTER
[facility]   [address]  [place] [address]  Phone: [phone]  Fax: [phone]              Francesco Schulte  1950    Encounter Date: 7/24/2018    Liliana Song M.D.          PROGRESS NOTE:  Subjective:   Francesco Schulte is a 67 y.o. male presenting for follow-up on his coronary artery disease.    Pertinent history:  Coronary artery disease  2008 - PCI to the LAD, POBA to the diagonal.   2014 - anterior STEMI. POBA to the diagonal. PCI to the proximal circumflex.  Restented again in 2014 after his Plavix was stopped for an injection and patient has recurrent MI.  2016 - recurrent MI. POBA to unknown vessel  2017 - anterior STEMI. Status post PCI to the proximal and mid LAD    Ischemic cardiomyopathy, ejection fraction 45%  Paroxysmal atrial fibrillation, diagnosed 2016. Intolerant of beta blockers  TIA vs. CVA in 2016  Polycythemia vera, essential thrombocytosis  Carotid stenosis status post right CEA in March 2018  Hypertension  Hyperlipidemia, intolerant to statins      Patient has been on prednisone for 3 months now.  Reports that his headaches have essentially resolved.  His prednisone was just discontinued yesterday.  He is hoping that his symptoms will now be resolved.  After his last visit, he underwent a tilt table study in which his blood pressure dropped to systolic 60s without any associated change in heart rate.  Since then he has been staying hydrated and drinks about 48-64 ounces a day.  He is also been increasing his salt intake as much as possible.  Reports that his dizziness is still ongoing however it is improved.    Denies any anginal symptoms.  Continues to be on Brilinta.  No heart failure symptoms.  Denies any palpitations.  Still on Eliquis.  No bleeding issues.  Blood pressures have been mostly 110s systolic.  Continues to be on WelChol which is tolerating well.    Past Medical History:   Diagnosis Date   •  "Anesthesia     resistant to pain meds   • Cancer (HCC)     polycythemia vera   • Diabetes (HCC)     insulin and oral meds   • Family history of punctured lung 2002    left lung   • Heart attack (HCC) 03/2017   • Hemorrhagic disorder (HCC)     on blood thinners   • High cholesterol    • Ischemic cardiomyopathy    • Kidney stones     hx of kidney stones   • Orthostatic hypotension 3/29/2018   • Pain     headache pain   • Polycythemia vera(238.4)    • Stroke (HCC) 2016    r/t afib; eye issues resolved afterward   • Urinary bladder disorder     enlarged prostate, weak stream, difficulty urinating   • Vertigo      Past Surgical History:   Procedure Laterality Date   • TEMPORAL ARTERY BIOPSY Right 6/26/2018    Procedure: TEMPORAL ARTERY BIOPSY;  Surgeon: Rajendra Aguilar M.D.;  Location: SURGERY SAME DAY [facility] ORS;  Service: General   • CAROTID ENDARTERECTOMY Right 3/21/2018    Procedure: CAROTID ENDARTERECTOMY- W/EEG MONITORING;  Surgeon: Benny Morfin M.D.;  Location: SURGERY [facility];  Service: General   • APPENDECTOMY     • CATH PLACEMENT      right arm   • LAMINOTOMY      diskectomy; C4   • OTHER CARDIAC SURGERY      stents x 3     History reviewed. No pertinent family history.     History   Smoking Status   • Never Smoker   Smokeless Tobacco   • Never Used     Allergies   Allergen Reactions   • Beta Adrenergic Blockers Unspecified     dizziness   • Metformin Unspecified     \"Similar to a heart attack, I was hospitalized\"   • Simvastatin      Myalgias   • Statins [Hmg-Coa-R Inhibitors]      Muscle ache, joint pain     Outpatient Encounter Prescriptions as of 7/24/2018   Medication Sig Dispense Refill   • PREDNISONE PO Take 60 mg by mouth.     • therapeutic multivitamin-minerals (THERAGRAN-M) Tab Take 1 Tab by mouth every day.     • apixaban (ELIQUIS) 5mg Tab Take 1 Tab by mouth 2 Times a Day. 180 Tab 1   • ticagrelor (BRILINTA) 90 MG Tab tablet Take 1 Tab by mouth 2 Times a Day. 180 Tab 3   • insulin " "glargine (LANTUS) 100 UNIT/ML Solution Inject 10 Units as instructed every evening.     • tamsulosin (FLOMAX) 0.4 MG capsule Take 0.4 mg by mouth every evening.     • finasteride (PROSCAR) 5 MG Tab Take 5 mg by mouth every day.     • colesevelam (WELCHOL) 625 MG Tab Take 625 mg by mouth 2 times a day, with meals.     • glipiZIDE (GLUCOTROL) 10 MG Tab Take 10 mg by mouth every day.     • hydroxyurea (HYDREA) 500 MG Cap Take 1,500 mg by mouth every evening. Alternates taking 1000 mg one day and 1500 the next day       No facility-administered encounter medications on file as of 7/24/2018.      Review of Systems   Constitutional: Negative for malaise/fatigue.   Respiratory: Negative for shortness of breath.    Cardiovascular: Negative for chest pain, palpitations, orthopnea, leg swelling and PND.   Gastrointestinal: Negative for abdominal pain.   Musculoskeletal: Negative for falls.   Skin: Negative.    Neurological: Positive for dizziness. Negative for loss of consciousness and headaches.   Endo/Heme/Allergies: Does not bruise/bleed easily.   Psychiatric/Behavioral: Negative for depression.   All other systems reviewed and are negative.       Objective:   /60   Pulse 92   Ht 1.905 m (6' 3\")   Wt 99.3 kg (219 lb)   SpO2 96%   BMI 27.37 kg/m²      Physical Exam   Constitutional: He is oriented to person, place, and time. No distress.   HENT:   Head: Normocephalic and atraumatic.   Eyes: Conjunctivae are normal.   Neck: Normal range of motion. Neck supple. No JVD present.   Cardiovascular: Normal rate, regular rhythm and normal heart sounds.  Exam reveals no gallop and no friction rub.    No murmur heard.  Pulmonary/Chest: Effort normal and breath sounds normal. No respiratory distress. He has no wheezes. He has no rales.   Abdominal: Soft. He exhibits no distension. There is no tenderness.   Musculoskeletal: He exhibits no edema.   Neurological: He is alert and oriented to person, place, and time.   Skin: " Skin is warm and dry. He is not diaphoretic.   Psychiatric: He has a normal mood and affect.   Nursing note and vitals reviewed.     Echocardiogram performed in September 2017 showed a mildly reduced LV function with an EF of 45%. No significant changes from prior study.     Labs performed in June 2018 were reviewed and showed hemoglobin 13.5.  Platelets 177.    Assessment:     1. Dyslipidemia     2. Orthostatic hypotension     3. Coronary artery disease involving native coronary artery of native heart without angina pectoris     4. Chronic systolic CHF (congestive heart failure) (HCC)     5. Paroxysmal atrial fibrillation (HCC)     6. Dizziness         Medical Decision Making:  Today's Assessment / Status / Plan:     Dizziness: Tilt table study showed concern for possible orthostasis but no change in heart rate was noted.  Patient was advised to increase his salt and fluid intake further.  Since his prednisone was just discontinued recently, for now would recommend ongoing monitoring.  If his symptoms and/or his blood pressure fail to improve, will consider addition of medications to help with his symptoms.    Coronary artery disease status post PCI:  Ischemic cardiomyopathy (EF 45%):  Patient is free of anginal heart failure symptoms.  Since he is over 1 year out from his PCI, will discontinue Brilinta today and instead start him on a baby aspirin every day, especially since he is on Eliquis.  I spoke with his hematologist Dr. Sims personally today regarding the above.  Due to his underlying polycythemia vera/essential thrombocytosis which is now well controlled on hydroxyurea, per Dr. Sims aspirin alone is needed for him from a hematology standpoint.  He is not on any other cardioprotective medications like beta blockers or ACE inhibitors due to hypotension/dizziness as detailed above.    Hyperlipidemia: Patient is intolerant to statins.  Continue WelChol at current dose.    Paroxysmal atrial fibrillation:  Currently in sinus.  Not on any AV leslie blocking agents.  Continue Eliquis.  No bleeding issues.      Return to clinic in 2 months or earlier if needed.    Thank you for allowing me to participate in the care of this patient. Please do not hesitate to contact me with any questions.    Liliana Song MD  Cardiologist  [facility]    PLEASE NOTE: This dictation was created using voice recognition software.         NOE Harman.R.N.  [address]  [address]  [facility] [place] [facility] [address]  VIA In Basket

## 2018-07-24 NOTE — Clinical Note
Milvia Nunes,  Can you please call the patient and let him know that I spoke with Dr. Sims personally.  Okay to discontinue Brilinta.  Instead start aspirin 81 mg every day.  Continue Eliquis at current dose. Thank you. VANCE

## 2018-07-25 ENCOUNTER — TELEPHONE (OUTPATIENT)
Dept: CARDIOLOGY | Facility: MEDICAL CENTER | Age: 68
End: 2018-07-25

## 2018-07-25 NOTE — TELEPHONE ENCOUNTER
Attempted to call pt to discuss recommendations. No answer, L/m for pt to please call back.    1035: S/w pt. Educated him on Dr. Song's recommendations. He states verbal understanding and denies any questions at this time. Pt appreciative of call.     ----- Message from Liliana Song M.D. sent at 7/24/2018 12:48 PM PDT -----  Milvia Nunes,   Can you please call the patient and let him know that I spoke with Dr. Sims personally.  Okay to discontinue Brilinta.  Instead start aspirin 81 mg every day.  Continue Eliquis at current dose.  Thank you.  VANCE

## 2018-08-01 ENCOUNTER — PATIENT OUTREACH (OUTPATIENT)
Dept: HEALTH INFORMATION MANAGEMENT | Facility: OTHER | Age: 68
End: 2018-08-01

## 2018-08-01 NOTE — PROGRESS NOTES
Outcome: Left Message    Please transfer to Patient Outreach Team at [phone] when patient returns call.    WebIZ Checked & Epic Updated:  yes    HealthConnect Verified: no    Attempt # 1

## 2018-08-02 ENCOUNTER — PATIENT MESSAGE (OUTPATIENT)
Dept: HEALTH INFORMATION MANAGEMENT | Facility: OTHER | Age: 68
End: 2018-08-02

## 2018-08-09 ENCOUNTER — HOSPITAL ENCOUNTER (INPATIENT)
Facility: MEDICAL CENTER | Age: 68
LOS: 1 days | DRG: 149 | End: 2018-08-09
Attending: EMERGENCY MEDICINE | Admitting: INTERNAL MEDICINE
Payer: MEDICARE

## 2018-08-09 ENCOUNTER — APPOINTMENT (OUTPATIENT)
Dept: RADIOLOGY | Facility: MEDICAL CENTER | Age: 68
DRG: 149 | End: 2018-08-09
Attending: EMERGENCY MEDICINE
Payer: MEDICARE

## 2018-08-09 ENCOUNTER — PATIENT OUTREACH (OUTPATIENT)
Dept: HEALTH INFORMATION MANAGEMENT | Facility: OTHER | Age: 68
End: 2018-08-09

## 2018-08-09 VITALS
WEIGHT: 228.18 LBS | DIASTOLIC BLOOD PRESSURE: 70 MMHG | TEMPERATURE: 98.2 F | BODY MASS INDEX: 28.37 KG/M2 | SYSTOLIC BLOOD PRESSURE: 123 MMHG | OXYGEN SATURATION: 95 % | RESPIRATION RATE: 16 BRPM | HEART RATE: 66 BPM | HEIGHT: 75 IN

## 2018-08-09 DIAGNOSIS — R79.89 ELEVATED LACTIC ACID LEVEL: ICD-10-CM

## 2018-08-09 DIAGNOSIS — R26.2 UNABLE TO AMBULATE: ICD-10-CM

## 2018-08-09 DIAGNOSIS — R42 VERTIGO: ICD-10-CM

## 2018-08-09 DIAGNOSIS — R55 NEAR SYNCOPE: ICD-10-CM

## 2018-08-09 DIAGNOSIS — E86.0 DEHYDRATION: ICD-10-CM

## 2018-08-09 DIAGNOSIS — R42 ACUTE ONSET OF SEVERE VERTIGO: ICD-10-CM

## 2018-08-09 DIAGNOSIS — R73.9 HYPERGLYCEMIA WITHOUT KETOSIS: ICD-10-CM

## 2018-08-09 DIAGNOSIS — R11.2 NAUSEA AND VOMITING IN ADULT: ICD-10-CM

## 2018-08-09 DIAGNOSIS — R33.8 ACUTE URINARY RETENTION: ICD-10-CM

## 2018-08-09 LAB
ALBUMIN SERPL BCP-MCNC: 4.5 G/DL (ref 3.2–4.9)
ALBUMIN/GLOB SERPL: 1.5 G/DL
ALP SERPL-CCNC: 45 U/L (ref 30–99)
ALT SERPL-CCNC: 21 U/L (ref 2–50)
ANION GAP SERPL CALC-SCNC: 12 MMOL/L (ref 0–11.9)
APPEARANCE UR: CLEAR
APTT PPP: 30.5 SEC (ref 24.7–36)
AST SERPL-CCNC: 21 U/L (ref 12–45)
BACTERIA #/AREA URNS HPF: NEGATIVE /HPF
BASOPHILS # BLD AUTO: 0 % (ref 0–1.8)
BASOPHILS # BLD: 0 K/UL (ref 0–0.12)
BILIRUB SERPL-MCNC: 0.9 MG/DL (ref 0.1–1.5)
BILIRUB UR QL STRIP.AUTO: NEGATIVE
BUN SERPL-MCNC: 18 MG/DL (ref 8–22)
CALCIUM SERPL-MCNC: 9.3 MG/DL (ref 8.4–10.2)
CHLORIDE SERPL-SCNC: 103 MMOL/L (ref 96–112)
CO2 SERPL-SCNC: 20 MMOL/L (ref 20–33)
COLOR UR: YELLOW
CREAT SERPL-MCNC: 1.06 MG/DL (ref 0.5–1.4)
EKG IMPRESSION: NORMAL
EOSINOPHIL # BLD AUTO: 0 K/UL (ref 0–0.51)
EOSINOPHIL NFR BLD: 0 % (ref 0–6.9)
EPI CELLS #/AREA URNS HPF: NEGATIVE /HPF
ERYTHROCYTE [DISTWIDTH] IN BLOOD BY AUTOMATED COUNT: 56.9 FL (ref 35.9–50)
EST. AVERAGE GLUCOSE BLD GHB EST-MCNC: 203 MG/DL
GLOBULIN SER CALC-MCNC: 3 G/DL (ref 1.9–3.5)
GLUCOSE BLD-MCNC: 193 MG/DL (ref 65–99)
GLUCOSE BLD-MCNC: 229 MG/DL (ref 65–99)
GLUCOSE BLD-MCNC: 346 MG/DL (ref 65–99)
GLUCOSE SERPL-MCNC: 312 MG/DL (ref 65–99)
GLUCOSE UR STRIP.AUTO-MCNC: >=1000 MG/DL
HBA1C MFR BLD: 8.7 % (ref 0–5.6)
HCT VFR BLD AUTO: 44.2 % (ref 42–52)
HGB BLD-MCNC: 13.8 G/DL (ref 14–18)
INR PPP: 1.22 (ref 0.87–1.13)
KETONES UR STRIP.AUTO-MCNC: ABNORMAL MG/DL
LACTATE BLD-SCNC: 2.2 MMOL/L (ref 0.5–2)
LACTATE BLD-SCNC: 2.8 MMOL/L (ref 0.5–2)
LACTATE BLD-SCNC: 3 MMOL/L (ref 0.5–2)
LACTATE BLD-SCNC: 3.6 MMOL/L (ref 0.5–2)
LEUKOCYTE ESTERASE UR QL STRIP.AUTO: NEGATIVE
LIPASE SERPL-CCNC: 47 U/L (ref 7–58)
LV EJECT FRACT  99904: 40
LYMPHOCYTES # BLD AUTO: 0.45 K/UL (ref 1–4.8)
LYMPHOCYTES NFR BLD: 11 % (ref 22–41)
MANUAL DIFF BLD: NORMAL
MCH RBC QN AUTO: 30 PG (ref 27–33)
MCHC RBC AUTO-ENTMCNC: 31.2 G/DL (ref 33.7–35.3)
MCV RBC AUTO: 96.1 FL (ref 81.4–97.8)
MICRO URNS: ABNORMAL
MONOCYTES # BLD AUTO: 0.53 K/UL (ref 0–0.85)
MONOCYTES NFR BLD AUTO: 13 % (ref 0–13.4)
MUCOUS THREADS #/AREA URNS HPF: ABNORMAL /HPF
NEUTROPHILS # BLD AUTO: 3.12 K/UL (ref 1.82–7.42)
NEUTROPHILS NFR BLD: 75 % (ref 44–72)
NEUTS BAND NFR BLD MANUAL: 1 % (ref 0–10)
NITRITE UR QL STRIP.AUTO: NEGATIVE
NRBC # BLD AUTO: 0 K/UL
NRBC BLD-RTO: 0 /100 WBC
PH UR STRIP.AUTO: 5.5 [PH]
PLATELET # BLD AUTO: 223 K/UL (ref 164–446)
PLATELET BLD QL SMEAR: NORMAL
PMV BLD AUTO: 11 FL (ref 9–12.9)
POTASSIUM SERPL-SCNC: 3.7 MMOL/L (ref 3.6–5.5)
PROCALCITONIN SERPL-MCNC: <0.05 NG/ML
PROT SERPL-MCNC: 7.5 G/DL (ref 6–8.2)
PROT UR QL STRIP: 30 MG/DL
PROTHROMBIN TIME: 15.3 SEC (ref 12–14.6)
RBC # BLD AUTO: 4.6 M/UL (ref 4.7–6.1)
RBC # URNS HPF: ABNORMAL /HPF
RBC BLD AUTO: NORMAL
RBC UR QL AUTO: ABNORMAL
SODIUM SERPL-SCNC: 135 MMOL/L (ref 135–145)
SP GR UR STRIP.AUTO: 1.02
TROPONIN I SERPL-MCNC: 0.03 NG/ML (ref 0–0.04)
TSH SERPL DL<=0.005 MIU/L-ACNC: 1.49 UIU/ML (ref 0.38–5.33)
WBC # BLD AUTO: 4.1 K/UL (ref 4.8–10.8)
WBC #/AREA URNS HPF: ABNORMAL /HPF

## 2018-08-09 PROCEDURE — 83036 HEMOGLOBIN GLYCOSYLATED A1C: CPT

## 2018-08-09 PROCEDURE — 93005 ELECTROCARDIOGRAM TRACING: CPT | Performed by: EMERGENCY MEDICINE

## 2018-08-09 PROCEDURE — 82010 KETONE BODYS QUAN: CPT

## 2018-08-09 PROCEDURE — G8987 SELF CARE CURRENT STATUS: HCPCS | Mod: CK

## 2018-08-09 PROCEDURE — 85610 PROTHROMBIN TIME: CPT

## 2018-08-09 PROCEDURE — 85007 BL SMEAR W/DIFF WBC COUNT: CPT

## 2018-08-09 PROCEDURE — A9270 NON-COVERED ITEM OR SERVICE: HCPCS | Performed by: EMERGENCY MEDICINE

## 2018-08-09 PROCEDURE — 99235 HOSP IP/OBS SAME DATE MOD 70: CPT | Performed by: INTERNAL MEDICINE

## 2018-08-09 PROCEDURE — 96374 THER/PROPH/DIAG INJ IV PUSH: CPT | Mod: XU

## 2018-08-09 PROCEDURE — 83605 ASSAY OF LACTIC ACID: CPT | Mod: 91

## 2018-08-09 PROCEDURE — 97161 PT EVAL LOW COMPLEX 20 MIN: CPT

## 2018-08-09 PROCEDURE — 93306 TTE W/DOPPLER COMPLETE: CPT | Mod: 26 | Performed by: INTERNAL MEDICINE

## 2018-08-09 PROCEDURE — 93306 TTE W/DOPPLER COMPLETE: CPT

## 2018-08-09 PROCEDURE — 70498 CT ANGIOGRAPHY NECK: CPT

## 2018-08-09 PROCEDURE — 700111 HCHG RX REV CODE 636 W/ 250 OVERRIDE (IP): Performed by: EMERGENCY MEDICINE

## 2018-08-09 PROCEDURE — 51702 INSERT TEMP BLADDER CATH: CPT

## 2018-08-09 PROCEDURE — G8980 MOBILITY D/C STATUS: HCPCS | Mod: CI

## 2018-08-09 PROCEDURE — A9270 NON-COVERED ITEM OR SERVICE: HCPCS | Performed by: INTERNAL MEDICINE

## 2018-08-09 PROCEDURE — 85730 THROMBOPLASTIN TIME PARTIAL: CPT

## 2018-08-09 PROCEDURE — G8978 MOBILITY CURRENT STATUS: HCPCS | Mod: CI

## 2018-08-09 PROCEDURE — 81001 URINALYSIS AUTO W/SCOPE: CPT

## 2018-08-09 PROCEDURE — 700102 HCHG RX REV CODE 250 W/ 637 OVERRIDE(OP): Performed by: HOSPITALIST

## 2018-08-09 PROCEDURE — G8988 SELF CARE GOAL STATUS: HCPCS | Mod: CI

## 2018-08-09 PROCEDURE — 700102 HCHG RX REV CODE 250 W/ 637 OVERRIDE(OP): Performed by: EMERGENCY MEDICINE

## 2018-08-09 PROCEDURE — A9270 NON-COVERED ITEM OR SERVICE: HCPCS | Performed by: HOSPITALIST

## 2018-08-09 PROCEDURE — 83690 ASSAY OF LIPASE: CPT

## 2018-08-09 PROCEDURE — 700102 HCHG RX REV CODE 250 W/ 637 OVERRIDE(OP): Performed by: INTERNAL MEDICINE

## 2018-08-09 PROCEDURE — 770020 HCHG ROOM/CARE - TELE (206)

## 2018-08-09 PROCEDURE — 303105 HCHG CATHETER EXTRA

## 2018-08-09 PROCEDURE — 700117 HCHG RX CONTRAST REV CODE 255: Performed by: EMERGENCY MEDICINE

## 2018-08-09 PROCEDURE — 99285 EMERGENCY DEPT VISIT HI MDM: CPT

## 2018-08-09 PROCEDURE — 84145 PROCALCITONIN (PCT): CPT

## 2018-08-09 PROCEDURE — 36415 COLL VENOUS BLD VENIPUNCTURE: CPT

## 2018-08-09 PROCEDURE — 94760 N-INVAS EAR/PLS OXIMETRY 1: CPT

## 2018-08-09 PROCEDURE — 700105 HCHG RX REV CODE 258: Performed by: EMERGENCY MEDICINE

## 2018-08-09 PROCEDURE — 70496 CT ANGIOGRAPHY HEAD: CPT

## 2018-08-09 PROCEDURE — 80053 COMPREHEN METABOLIC PANEL: CPT

## 2018-08-09 PROCEDURE — 84443 ASSAY THYROID STIM HORMONE: CPT

## 2018-08-09 PROCEDURE — 84484 ASSAY OF TROPONIN QUANT: CPT

## 2018-08-09 PROCEDURE — 85027 COMPLETE CBC AUTOMATED: CPT

## 2018-08-09 PROCEDURE — 96376 TX/PRO/DX INJ SAME DRUG ADON: CPT | Mod: XU

## 2018-08-09 PROCEDURE — 71045 X-RAY EXAM CHEST 1 VIEW: CPT

## 2018-08-09 PROCEDURE — 700105 HCHG RX REV CODE 258: Performed by: INTERNAL MEDICINE

## 2018-08-09 PROCEDURE — 97165 OT EVAL LOW COMPLEX 30 MIN: CPT

## 2018-08-09 PROCEDURE — G8979 MOBILITY GOAL STATUS: HCPCS | Mod: CI

## 2018-08-09 PROCEDURE — 82962 GLUCOSE BLOOD TEST: CPT | Mod: 91

## 2018-08-09 PROCEDURE — 70450 CT HEAD/BRAIN W/O DYE: CPT

## 2018-08-09 RX ORDER — SODIUM CHLORIDE 9 MG/ML
INJECTION, SOLUTION INTRAVENOUS CONTINUOUS
Status: DISCONTINUED | OUTPATIENT
Start: 2018-08-09 | End: 2018-08-09 | Stop reason: HOSPADM

## 2018-08-09 RX ORDER — BISACODYL 10 MG
10 SUPPOSITORY, RECTAL RECTAL
Status: DISCONTINUED | OUTPATIENT
Start: 2018-08-09 | End: 2018-08-09 | Stop reason: HOSPADM

## 2018-08-09 RX ORDER — ACETAMINOPHEN 325 MG/1
650 TABLET ORAL EVERY 6 HOURS PRN
Status: DISCONTINUED | OUTPATIENT
Start: 2018-08-09 | End: 2018-08-09 | Stop reason: HOSPADM

## 2018-08-09 RX ORDER — MECLIZINE HYDROCHLORIDE 25 MG/1
25 TABLET ORAL 3 TIMES DAILY PRN
Status: DISCONTINUED | OUTPATIENT
Start: 2018-08-09 | End: 2018-08-09 | Stop reason: HOSPADM

## 2018-08-09 RX ORDER — ASPIRIN 600 MG/1
300 SUPPOSITORY RECTAL DAILY
Status: DISCONTINUED | OUTPATIENT
Start: 2018-08-09 | End: 2018-08-09 | Stop reason: HOSPADM

## 2018-08-09 RX ORDER — SODIUM CHLORIDE 9 MG/ML
1000 INJECTION, SOLUTION INTRAVENOUS ONCE
Status: COMPLETED | OUTPATIENT
Start: 2018-08-09 | End: 2018-08-09

## 2018-08-09 RX ORDER — FINASTERIDE 5 MG/1
5 TABLET, FILM COATED ORAL DAILY
Status: DISCONTINUED | OUTPATIENT
Start: 2018-08-09 | End: 2018-08-09 | Stop reason: HOSPADM

## 2018-08-09 RX ORDER — DIAZEPAM 5 MG/ML
5 INJECTION, SOLUTION INTRAMUSCULAR; INTRAVENOUS EVERY 6 HOURS PRN
Status: DISCONTINUED | OUTPATIENT
Start: 2018-08-09 | End: 2018-08-09

## 2018-08-09 RX ORDER — ONDANSETRON 4 MG/1
4 TABLET, ORALLY DISINTEGRATING ORAL EVERY 4 HOURS PRN
Status: DISCONTINUED | OUTPATIENT
Start: 2018-08-09 | End: 2018-08-09 | Stop reason: HOSPADM

## 2018-08-09 RX ORDER — COLESEVELAM 180 1/1
625 TABLET ORAL 2 TIMES DAILY WITH MEALS
Status: DISCONTINUED | OUTPATIENT
Start: 2018-08-09 | End: 2018-08-09 | Stop reason: HOSPADM

## 2018-08-09 RX ORDER — INSULIN GLARGINE 100 [IU]/ML
10 INJECTION, SOLUTION SUBCUTANEOUS NIGHTLY
Status: DISCONTINUED | OUTPATIENT
Start: 2018-08-09 | End: 2018-08-09 | Stop reason: HOSPADM

## 2018-08-09 RX ORDER — DEXTROSE MONOHYDRATE 25 G/50ML
25 INJECTION, SOLUTION INTRAVENOUS
Status: DISCONTINUED | OUTPATIENT
Start: 2018-08-09 | End: 2018-08-09 | Stop reason: HOSPADM

## 2018-08-09 RX ORDER — ONDANSETRON 2 MG/ML
4 INJECTION INTRAMUSCULAR; INTRAVENOUS EVERY 4 HOURS PRN
Status: DISCONTINUED | OUTPATIENT
Start: 2018-08-09 | End: 2018-08-09 | Stop reason: HOSPADM

## 2018-08-09 RX ORDER — ASPIRIN 81 MG/1
324 TABLET, CHEWABLE ORAL DAILY
Status: DISCONTINUED | OUTPATIENT
Start: 2018-08-09 | End: 2018-08-09 | Stop reason: HOSPADM

## 2018-08-09 RX ORDER — MORPHINE SULFATE 4 MG/ML
4 INJECTION, SOLUTION INTRAMUSCULAR; INTRAVENOUS EVERY 4 HOURS PRN
Status: DISCONTINUED | OUTPATIENT
Start: 2018-08-09 | End: 2018-08-09 | Stop reason: HOSPADM

## 2018-08-09 RX ORDER — SODIUM CHLORIDE 9 MG/ML
500 INJECTION, SOLUTION INTRAVENOUS
Status: DISCONTINUED | OUTPATIENT
Start: 2018-08-09 | End: 2018-08-09 | Stop reason: HOSPADM

## 2018-08-09 RX ORDER — DEXTROSE MONOHYDRATE 25 G/50ML
25 INJECTION, SOLUTION INTRAVENOUS
Status: DISCONTINUED | OUTPATIENT
Start: 2018-08-09 | End: 2018-08-09

## 2018-08-09 RX ORDER — HYDRALAZINE HYDROCHLORIDE 20 MG/ML
10 INJECTION INTRAMUSCULAR; INTRAVENOUS
Status: DISCONTINUED | OUTPATIENT
Start: 2018-08-09 | End: 2018-08-09 | Stop reason: HOSPADM

## 2018-08-09 RX ORDER — TAMSULOSIN HYDROCHLORIDE 0.4 MG/1
0.4 CAPSULE ORAL ONCE
Status: COMPLETED | OUTPATIENT
Start: 2018-08-09 | End: 2018-08-09

## 2018-08-09 RX ORDER — TRAMADOL HYDROCHLORIDE 50 MG/1
50 TABLET ORAL EVERY 6 HOURS PRN
Status: DISCONTINUED | OUTPATIENT
Start: 2018-08-09 | End: 2018-08-09 | Stop reason: HOSPADM

## 2018-08-09 RX ORDER — LABETALOL HYDROCHLORIDE 5 MG/ML
10 INJECTION, SOLUTION INTRAVENOUS EVERY 4 HOURS PRN
Status: DISCONTINUED | OUTPATIENT
Start: 2018-08-09 | End: 2018-08-09 | Stop reason: HOSPADM

## 2018-08-09 RX ORDER — ONDANSETRON 2 MG/ML
4 INJECTION INTRAMUSCULAR; INTRAVENOUS ONCE
Status: COMPLETED | OUTPATIENT
Start: 2018-08-09 | End: 2018-08-09

## 2018-08-09 RX ORDER — DIAZEPAM 5 MG/1
5 TABLET ORAL EVERY 6 HOURS PRN
Status: DISCONTINUED | OUTPATIENT
Start: 2018-08-09 | End: 2018-08-09 | Stop reason: HOSPADM

## 2018-08-09 RX ORDER — MECLIZINE HYDROCHLORIDE 25 MG/1
25 TABLET ORAL ONCE
Status: COMPLETED | OUTPATIENT
Start: 2018-08-09 | End: 2018-08-09

## 2018-08-09 RX ORDER — POLYETHYLENE GLYCOL 3350 17 G/17G
1 POWDER, FOR SOLUTION ORAL
Status: DISCONTINUED | OUTPATIENT
Start: 2018-08-09 | End: 2018-08-09 | Stop reason: HOSPADM

## 2018-08-09 RX ORDER — SODIUM CHLORIDE 9 MG/ML
30 INJECTION, SOLUTION INTRAVENOUS
Status: DISCONTINUED | OUTPATIENT
Start: 2018-08-09 | End: 2018-08-09 | Stop reason: HOSPADM

## 2018-08-09 RX ORDER — ASPIRIN 325 MG
325 TABLET ORAL DAILY
Status: DISCONTINUED | OUTPATIENT
Start: 2018-08-09 | End: 2018-08-09 | Stop reason: HOSPADM

## 2018-08-09 RX ORDER — AMOXICILLIN 250 MG
2 CAPSULE ORAL 2 TIMES DAILY
Status: DISCONTINUED | OUTPATIENT
Start: 2018-08-09 | End: 2018-08-09 | Stop reason: HOSPADM

## 2018-08-09 RX ORDER — MECLIZINE HYDROCHLORIDE 25 MG/1
25 TABLET ORAL 3 TIMES DAILY PRN
Qty: 30 TAB | Refills: 0 | Status: SHIPPED | OUTPATIENT
Start: 2018-08-09 | End: 2018-09-15

## 2018-08-09 RX ADMIN — TAMSULOSIN HYDROCHLORIDE 0.4 MG: 0.4 CAPSULE ORAL at 13:19

## 2018-08-09 RX ADMIN — ONDANSETRON 4 MG: 2 INJECTION INTRAMUSCULAR; INTRAVENOUS at 01:04

## 2018-08-09 RX ADMIN — MECLIZINE HYDROCHLORIDE 25 MG: 25 TABLET ORAL at 05:42

## 2018-08-09 RX ADMIN — TRAMADOL HYDROCHLORIDE 50 MG: 50 TABLET, COATED ORAL at 09:12

## 2018-08-09 RX ADMIN — INSULIN HUMAN 2 UNITS: 100 INJECTION, SOLUTION PARENTERAL at 11:47

## 2018-08-09 RX ADMIN — SODIUM CHLORIDE 1000 ML: 9 INJECTION, SOLUTION INTRAVENOUS at 02:57

## 2018-08-09 RX ADMIN — SODIUM CHLORIDE 1000 ML: 9 INJECTION, SOLUTION INTRAVENOUS at 02:10

## 2018-08-09 RX ADMIN — TRAMADOL HYDROCHLORIDE 50 MG: 50 TABLET, COATED ORAL at 15:09

## 2018-08-09 RX ADMIN — SODIUM CHLORIDE 1000 ML: 9 INJECTION, SOLUTION INTRAVENOUS at 01:04

## 2018-08-09 RX ADMIN — SODIUM CHLORIDE: 9 INJECTION, SOLUTION INTRAVENOUS at 04:39

## 2018-08-09 RX ADMIN — IOHEXOL 100 ML: 350 INJECTION, SOLUTION INTRAVENOUS at 04:43

## 2018-08-09 RX ADMIN — MECLIZINE HYDROCHLORIDE 25 MG: 25 TABLET ORAL at 01:04

## 2018-08-09 RX ADMIN — ONDANSETRON 4 MG: 2 INJECTION INTRAMUSCULAR; INTRAVENOUS at 04:39

## 2018-08-09 RX ADMIN — FINASTERIDE 5 MG: 5 TABLET, FILM COATED ORAL at 11:05

## 2018-08-09 ASSESSMENT — ENCOUNTER SYMPTOMS
BRUISES/BLEEDS EASILY: 0
DIARRHEA: 0
BLURRED VISION: 0
PALPITATIONS: 0
LOSS OF CONSCIOUSNESS: 0
SENSORY CHANGE: 0
SHORTNESS OF BREATH: 0
FOCAL WEAKNESS: 0
COUGH: 0
PHOTOPHOBIA: 0
SORE THROAT: 0
SEIZURES: 0
HEADACHES: 1
MYALGIAS: 0
ABDOMINAL PAIN: 0
VOMITING: 1
SPEECH CHANGE: 0
NECK PAIN: 0
BACK PAIN: 0
FEVER: 0
SPUTUM PRODUCTION: 0
WHEEZING: 0
BLOOD IN STOOL: 0
FLANK PAIN: 0
NAUSEA: 1
DIAPHORESIS: 0
DIZZINESS: 1
CHILLS: 0

## 2018-08-09 ASSESSMENT — PAIN SCALES - GENERAL
PAINLEVEL_OUTOF10: 6
PAINLEVEL_OUTOF10: 2
PAINLEVEL_OUTOF10: 0
PAINLEVEL_OUTOF10: 0
PAINLEVEL_OUTOF10: 4

## 2018-08-09 ASSESSMENT — COGNITIVE AND FUNCTIONAL STATUS - GENERAL
HELP NEEDED FOR BATHING: A LITTLE
DRESSING REGULAR LOWER BODY CLOTHING: A LITTLE
SUGGESTED CMS G CODE MODIFIER MOBILITY: CK
MOVING TO AND FROM BED TO CHAIR: A LITTLE
MOBILITY SCORE: 16
DRESSING REGULAR UPPER BODY CLOTHING: A LITTLE
STANDING UP FROM CHAIR USING ARMS: A LITTLE
TURNING FROM BACK TO SIDE WHILE IN FLAT BAD: A LITTLE
SUGGESTED CMS G CODE MODIFIER DAILY ACTIVITY: CJ
DAILY ACTIVITIY SCORE: 20
TOILETING: A LITTLE
WALKING IN HOSPITAL ROOM: A LOT
MOVING FROM LYING ON BACK TO SITTING ON SIDE OF FLAT BED: A LITTLE
CLIMB 3 TO 5 STEPS WITH RAILING: A LOT

## 2018-08-09 ASSESSMENT — GAIT ASSESSMENTS
DEVIATION: STEP TO
GAIT LEVEL OF ASSIST: SUPERVISED
DISTANCE (FEET): 200

## 2018-08-09 ASSESSMENT — LIFESTYLE VARIABLES
EVER_SMOKED: NEVER
ALCOHOL_USE: NO
EVER_SMOKED: NEVER

## 2018-08-09 ASSESSMENT — CHA2DS2 SCORE
VASCULAR DISEASE: YES
AGE 75 OR GREATER: NO
CHF OR LEFT VENTRICULAR DYSFUNCTION: YES
PRIOR STROKE OR TIA OR THROMBOEMBOLISM: YES
DIABETES: YES
CHA2DS2 VASC SCORE: 6
HYPERTENSION: NO
SEX: MALE
AGE 65 TO 74: YES

## 2018-08-09 ASSESSMENT — PATIENT HEALTH QUESTIONNAIRE - PHQ9
2. FEELING DOWN, DEPRESSED, IRRITABLE, OR HOPELESS: NOT AT ALL
1. LITTLE INTEREST OR PLEASURE IN DOING THINGS: NOT AT ALL
SUM OF ALL RESPONSES TO PHQ9 QUESTIONS 1 AND 2: 0

## 2018-08-09 NOTE — ED NOTES
"The pt wife brought the pt to the ed in [facility].  The wife came into the ed and stated that she is unable to get the pt out of the vehicle and the pt is \"unconscious\".  Staff out to the pov w/ a stretcher.  Asst the conscious pt w/ x 2 asst onto the stretcher.  The pt was brought into the ed.  The pt was not and did not loose consciousness while in our care.  Staff began interventions to tx the pt and the pt c/o and sx.  ERP was immed. At the pt bedside.  See orders, mar, and notes for following care.   "

## 2018-08-09 NOTE — ASSESSMENT & PLAN NOTE
No evidence of acute exacerbation at this time  Strict I/O  Continue cautious IV fluid hydration in the setting of severe dehydration from persistent nausea and vomiting with lactic acidosis  Monitor respiratory status closely

## 2018-08-09 NOTE — CARE PLAN
Problem: Infection  Goal: Will remain free from infection  Outcome: PROGRESSING AS EXPECTED  Pt afebrile, rocephin and fluconazole in use per MAR and MD order    Problem: Knowledge Deficit  Goal: Knowledge of disease process/condition, treatment plan, diagnostic tests, and medications will improve  Outcome: PROGRESSING AS EXPECTED  Diabetes education implemented, questions/concerns addressed. POC discussed regarding diabetes, wound care and lotrizine cream implemented, fall precautions, pt verbalizes understanding, denies further questions/concerns at this time. Will continue to monitor.

## 2018-08-09 NOTE — ASSESSMENT & PLAN NOTE
Likely secondary to profound dehydration in the setting of intractable nausea and vomiting  Continue IV fluid hydration with normal saline and trend lactate to resolution

## 2018-08-09 NOTE — DISCHARGE INSTRUCTIONS
Benign Positional Vertigo  Introduction  Vertigo is the feeling that you or your surroundings are moving when they are not. Benign positional vertigo is the most common form of vertigo. The cause of this condition is not serious (is benign). This condition is triggered by certain movements and positions (is positional). This condition can be dangerous if it occurs while you are doing something that could endanger you or others, such as driving.  What are the causes?  In many cases, the cause of this condition is not known. It may be caused by a disturbance in an area of the inner ear that helps your brain to sense movement and balance. This disturbance can be caused by a viral infection (labyrinthitis), head injury, or repetitive motion.  What increases the risk?  This condition is more likely to develop in:  · Women.  · People who are 50 years of age or older.  What are the signs or symptoms?  Symptoms of this condition usually happen when you move your head or your eyes in different directions. Symptoms may start suddenly, and they usually last for less than a minute. Symptoms may include:  · Loss of balance and falling.  · Feeling like you are spinning or moving.  · Feeling like your surroundings are spinning or moving.  · Nausea and vomiting.  · Blurred vision.  · Dizziness.  · Involuntary eye movement (nystagmus).  Symptoms can be mild and cause only slight annoyance, or they can be severe and interfere with daily life. Episodes of benign positional vertigo may return (recur) over time, and they may be triggered by certain movements. Symptoms may improve over time.  How is this diagnosed?  This condition is usually diagnosed by medical history and a physical exam of the head, neck, and ears. You may be referred to a health care provider who specializes in ear, nose, and throat (ENT) problems (otolaryngologist) or a provider who specializes in disorders of the nervous system (neurologist). You may have  additional testing, including:  · MRI.  · A CT scan.  · Eye movement tests. Your health care provider may ask you to change positions quickly while he or she watches you for symptoms of benign positional vertigo, such as nystagmus. Eye movement may be tested with an electronystagmogram (ENG), caloric stimulation, the Annette-Hallpike test, or the roll test.  · An electroencephalogram (EEG). This records electrical activity in your brain.  · Hearing tests.  How is this treated?  Usually, your health care provider will treat this by moving your head in specific positions to adjust your inner ear back to normal. Surgery may be needed in severe cases, but this is rare. In some cases, benign positional vertigo may resolve on its own in 2-4 weeks.  Follow these instructions at home:  Safety  · Move slowly.Avoid sudden body or head movements.  · Avoid driving.  · Avoid operating heavy machinery.  · Avoid doing any tasks that would be dangerous to you or others if a vertigo episode would occur.  · If you have trouble walking or keeping your balance, try using a cane for stability. If you feel dizzy or unstable, sit down right away.  · Return to your normal activities as told by your health care provider. Ask your health care provider what activities are safe for you.  General instructions  · Take over-the-counter and prescription medicines only as told by your health care provider.  · Avoid certain positions or movements as told by your health care provider.  · Drink enough fluid to keep your urine clear or pale yellow.  · Keep all follow-up visits as told by your health care provider. This is important.  Contact a health care provider if:  · You have a fever.  · Your condition gets worse or you develop new symptoms.  · Your family or friends notice any behavioral changes.  · Your nausea or vomiting gets worse.  · You have numbness or a “pins and needles” sensation.  Get help right away if:  · You have difficulty speaking or  moving.  · You are always dizzy.  · You faint.  · You develop severe headaches.  · You have weakness in your legs or arms.  · You have changes in your hearing or vision.  · You develop a stiff neck.  · You develop sensitivity to light.  This information is not intended to replace advice given to you by your health care provider. Make sure you discuss any questions you have with your health care provider.  Document Released: 09/25/2007 Document Revised: 05/25/2017 Document Reviewed: 04/11/2016  © 2017 Elsevier    Discharge Instructions    Discharged to home by car with relative. Discharged via wheelchair, hospital escort: Yes.  Special equipment needed: Not Applicable    Be sure to schedule a follow-up appointment with your primary care doctor or any specialists as instructed.     Discharge Plan:   Influenza Vaccine Indication: Patient Refuses (October 2017)    I understand that a diet low in cholesterol, fat, and sodium is recommended for good health. Unless I have been given specific instructions below for another diet, I accept this instruction as my diet prescription.   Other diet: as tolerated    Special Instructions: None    · Is patient discharged on Warfarin / Coumadin?   No     Depression / Suicide Risk    As you are discharged from this [facility] facility, it is important to learn how to keep safe from harming yourself.    Recognize the warning signs:  · Abrupt changes in personality, positive or negative- including increase in energy   · Giving away possessions  · Change in eating patterns- significant weight changes-  positive or negative  · Change in sleeping patterns- unable to sleep or sleeping all the time   · Unwillingness or inability to communicate  · Depression  · Unusual sadness, discouragement and loneliness  · Talk of wanting to die  · Neglect of personal appearance   · Rebelliousness- reckless behavior  · Withdrawal from people/activities they love  · Confusion- inability to  concentrate     If you or a loved one observes any of these behaviors or has concerns about self-harm, here's what you can do:  · Talk about it- your feelings and reasons for harming yourself  · Remove any means that you might use to hurt yourself (examples: pills, rope, extension cords, firearm)  · Get professional help from the community (Mental Health, Substance Abuse, psychological counseling)  · Do not be alone:Call your Safe Contact- someone whom you trust who will be there for you.  · Call your local CRISIS HOTLINE 723-6920 or 408-175-0144  · Call your local Children's Mobile Crisis Response Team [place] (267) 942-9126 or www.[employer]  · Call the toll free National Suicide Prevention Hotlines   · National Suicide Prevention Lifeline 039-261-GXTC (9821)  · [facility] Hope Line Network 800-SUICIDE (684-0015)        Meclizine tablets or capsules  What is this medicine?  MECLIZINE (ALAYNA messer) is an antihistamine. It is used to prevent nausea, vomiting, or dizziness caused by motion sickness. It is also used to prevent and treat vertigo (extreme dizziness or a feeling that you or your surroundings are tilting or spinning around).  This medicine may be used for other purposes; ask your health care provider or pharmacist if you have questions.  COMMON BRAND NAME(S): Antivert, Dramamine Less Drowsy, Medivert, Meni-D  What should I tell my health care provider before I take this medicine?  They need to know if you have any of these conditions:  -glaucoma  -lung or breathing disease, like asthma  -problems urinating  -prostate disease  -stomach or intestine problems  -an unusual or allergic reaction to meclizine, other medicines, foods, dyes, or preservatives  -pregnant or trying to get pregnant  -breast-feeding  How should I use this medicine?  Take this medicine by mouth with a glass of water. Follow the directions on the prescription label. If you are using this medicine to prevent motion sickness,  take the dose at least 1 hour before travel. If it upsets your stomach, take it with food or milk. Take your doses at regular intervals. Do not take your medicine more often than directed.  Talk to your pediatrician regarding the use of this medicine in children. Special care may be needed.  Overdosage: If you think you have taken too much of this medicine contact a poison control center or emergency room at once.  NOTE: This medicine is only for you. Do not share this medicine with others.  What if I miss a dose?  If you miss a dose, take it as soon as you can. If it is almost time for your next dose, take only that dose. Do not take double or extra doses.  What may interact with this medicine?  Do not take this medicine with any of the following medications:  -MAOIs like Carbex, Eldepryl, Marplan, Nardil, and Parnate  This medicine may also interact with the following medications:  -alcohol  -antihistamines for allergy, cough and cold  -certain medicines for anxiety or sleep  -certain medicines for depression, like amitriptyline, fluoxetine, sertraline  -certain medicines for seizures like phenobarbital, primidone  -general anesthetics like halothane, isoflurane, methoxyflurane, propofol  -local anesthetics like lidocaine, pramoxine, tetracaine  -medicines that relax muscles for surgery  -narcotic medicines for pain  -phenothiazines like chlorpromazine, mesoridazine, prochlorperazine, thioridazine  This list may not describe all possible interactions. Give your health care provider a list of all the medicines, herbs, non-prescription drugs, or dietary supplements you use. Also tell them if you smoke, drink alcohol, or use illegal drugs. Some items may interact with your medicine.  What should I watch for while using this medicine?  Tell your doctor or healthcare professional if your symptoms do not start to get better or if they get worse.  You may get drowsy or dizzy. Do not drive, use machinery, or do anything  that needs mental alertness until you know how this medicine affects you. Do not stand or sit up quickly, especially if you are an older patient. This reduces the risk of dizzy or fainting spells. Alcohol may interfere with the effect of this medicine. Avoid alcoholic drinks.  Your mouth may get dry. Chewing sugarless gum or sucking hard candy, and drinking plenty of water may help. Contact your doctor if the problem does not go away or is severe.  This medicine may cause dry eyes and blurred vision. If you wear contact lenses you may feel some discomfort. Lubricating drops may help. See your eye doctor if the problem does not go away or is severe.  What side effects may I notice from receiving this medicine?  Side effects that you should report to your doctor or health care professional as soon as possible:  -feeling faint or lightheaded, falls  -fast, irregular heartbeat  Side effects that usually do not require medical attention (report to your doctor or health care professional if they continue or are bothersome):  -constipation  -headache  -trouble passing urine or change in the amount of urine  -trouble sleeping  -upset stomach  This list may not describe all possible side effects. Call your doctor for medical advice about side effects. You may report side effects to FDA at 1-042-FDA-8208.  Where should I keep my medicine?  Keep out of the reach of children.  Store at room temperature between 15 and 30 degrees C (59 and 86 degrees F). Keep container tightly closed. Throw away any unused medicine after the expiration date.  NOTE: This sheet is a summary. It may not cover all possible information. If you have questions about this medicine, talk to your doctor, pharmacist, or health care provider.  © 2018 Elsevier/Gold Standard (2017-01-18 19:41:02)

## 2018-08-09 NOTE — DISCHARGE SUMMARY
Discharge Summary    CHIEF COMPLAINT ON ADMISSION  Chief Complaint   Patient presents with   • Loss of Consciousness     the the wife brought the pt to the ed as the pt is c/o severe nausea and dizziness w/ altered loc w/o inj or neuro changes. pt has a sig pmh and is on chr blood thinners.        Reason for Admission  vertigo    Admission Date  8/9/2018    CODE STATUS  Full Code    HPI & HOSPITAL COURSE  This is a 67 y.o. male here with dizziness. He has a history of vertigo that essentially has been persistent over the past 6-8 months.  This was associated with headaches and he was placed onto steroids for possible temporal arteritis. He had an MRI and a carotid done that showed an old cva that is occipital. He has had a cea of his carotid and recently has stopped steroids. He had persistent imbalance while on steroids. On this admission he had a sudden worsening of vertigo with vomiting. His headache has not worsened. He has had no tinnitus or hearing loss and no URI. He had a CTA of the head and neck that were normal. His headaches and vertigo may in fact be unrelated. BPPV is left on the differential for vertigo and he will go onto a trial of meclizine and physical therapy for this. He will follow up with his PCP for further considerations and may need a neurology consult in the future with persistent symptoms.        Therefore, he is discharged in good and stable condition to home with close outpatient follow-up.    The patient recovered much more quickly than anticipated on admission.    Discharge Date  8/9/18    FOLLOW UP ITEMS POST DISCHARGE  pcp    DISCHARGE DIAGNOSES  Active Problems:    Stenosis of right carotid artery-Right CEA 3/21/18 POA: Yes      Overview: 90% right carotid stenosis      Right CEA 3/21      Benny Morfin MD - vascular surgery    Essential hypertension POA: Yes      Overview: Strict perioperative blood pressure control, goal systolic 100-140    Vertigo POA: Yes    BPH (benign prostatic  hyperplasia) POA: Yes    Paroxysmal atrial fibrillation (HCC) POA: Yes    Chronic systolic CHF (congestive heart failure) (HCC) POA: Yes    Type 2 diabetes mellitus with complication, without long-term current use of insulin (HCC) POA: Yes      Overview: Chronic condition treated with Glipizide and Lantus.      Sliding scale insulin during acute hospitalization.          CAD (coronary artery disease) POA: Yes    History of stroke- old right parietal/occipital POA: Yes    Carotid stenosis, 90% right carotid POA: Yes    Elevated lactic acid level POA: Yes    Nausea and vomiting POA: Yes  Resolved Problems:    * No resolved hospital problems. *      FOLLOW UP  Future Appointments  Date Time Provider Department Center   8/22/2018 10:40 AM ALVINO Harman [facility]   9/26/2018 12:40 PM JANES Wilks None     No follow-up provider specified.    MEDICATIONS ON DISCHARGE     Medication List      START taking these medications      Instructions   meclizine 25 MG Tabs  Commonly known as:  ANTIVERT   Take 1 Tab by mouth 3 times a day as needed for Vertigo.  Dose:  25 mg        CONTINUE taking these medications      Instructions   apixaban 5mg Tabs  Commonly known as:  ELIQUIS   Doctor's comments:  This prescription is being transmitted by a pharmacist working under a collaborative practice protocol.  Take 1 Tab by mouth 2 Times a Day.  Dose:  5 mg     aspirin EC 81 MG Tbec  Commonly known as:  ECOTRIN   Take 1 Tab by mouth every day.  Dose:  81 mg     colesevelam 625 MG Tabs  Commonly known as:  WELCHOL   Take 625 mg by mouth 2 times a day, with meals.  Dose:  625 mg     finasteride 5 MG Tabs  Commonly known as:  PROSCAR   Take 5 mg by mouth every day.  Dose:  5 mg     glipiZIDE 10 MG Tabs  Commonly known as:  GLUCOTROL   Take 10 mg by mouth every day.  Dose:  10 mg     hydroxyurea 500 MG Caps  Commonly known as:  HYDREA   Take 1,500 mg by mouth every evening. Alternates taking 1000 mg one day and 1500  "the next day  Dose:  1500 mg     insulin glargine 100 UNIT/ML Soln  Commonly known as:  LANTUS   Inject 10 Units as instructed every evening.  Dose:  10 Units     tamsulosin 0.4 MG capsule  Commonly known as:  FLOMAX   Take 0.4 mg by mouth every evening.  Dose:  0.4 mg     therapeutic multivitamin-minerals Tabs   Take 1 Tab by mouth every day.  Dose:  1 Tab            Allergies  Allergies   Allergen Reactions   • Beta Adrenergic Blockers Unspecified     dizziness   • Metformin Unspecified     \"Similar to a heart attack, I was hospitalized\"   • Simvastatin      Myalgias   • Statins [Hmg-Coa-R Inhibitors]      Muscle ache, joint pain       DIET  Orders Placed This Encounter   Procedures   • Diet Order Consistent Carbohydrate     Standing Status:   Standing     Number of Occurrences:   1     Order Specific Question:   Diet:     Answer:   Consistent Carbohydrate [4]       ACTIVITY  As tolerated.  Weight bearing as tolerated    CONSULTATIONS  none    PROCEDURES  none    LABORATORY  Lab Results   Component Value Date    SODIUM 135 08/09/2018    POTASSIUM 3.7 08/09/2018    CHLORIDE 103 08/09/2018    CO2 20 08/09/2018    GLUCOSE 312 (H) 08/09/2018    BUN 18 08/09/2018    CREATININE 1.06 08/09/2018        Lab Results   Component Value Date    WBC 4.1 (L) 08/09/2018    HEMOGLOBIN 13.8 (L) 08/09/2018    HEMATOCRIT 44.2 08/09/2018    PLATELETCT 223 08/09/2018        Total time of the discharge process exceeds 35 minutes.  "

## 2018-08-09 NOTE — CARE PLAN
Problem: Safety  Goal: Will remain free from injury  Outcome: PROGRESSING AS EXPECTED  Safety precaution in effect. Call light within reach. Reminded patient to call for assist. Hourly rounds in effect. Nonskid socks in use. Verbalized understanding.    Problem: Infection  Goal: Will remain free from infection  Outcome: PROGRESSING AS EXPECTED  Implement Sandard precaution. Hand washing every encounter & before & after patient care. Verbalized understanding.    Problem: Knowledge Deficit  Goal: Knowledge of disease process/condition, treatment plan, diagnostic tests, and medications will improve  Outcome: PROGRESSING AS EXPECTED  Discussed Plan of care. Tests - Echo, MRI brain, for PT & OT Eval. Questions answered. Verbalized understanding.    Problem: Pain Management  Goal: Pain level will decrease to patient's comfort goal    Intervention: Follow pain managment plan developed in collaboration with patient and Interdisciplinary Team  Outcome : Progressing as expected.                     Educated on pain scale. Encouraged to verbalize pain. Will medicate as per MAR.

## 2018-08-09 NOTE — ASSESSMENT & PLAN NOTE
Start on insulin sliding scale with serial Accu-Cheks  Continue Lantus 10 units nightly  Check hemoglobin A1c  Hypoglycemic protocol in place

## 2018-08-09 NOTE — PROGRESS NOTES
1605 Discharge instructions given to the patient & spouse. Patient was discharged with patient's belongings, e prescription for Meclizine via w/c accompanied by one female CNA & spouse via Private car.

## 2018-08-09 NOTE — ED NOTES
Bedside report included the hold on the oral antihyperglycemics for 48 hours from 8/9/18 0415 to 8/11/18 at 0415.

## 2018-08-09 NOTE — PROGRESS NOTES
1328 Andres catheter was discontinued per MD's order, noted with clear yellow urine. Will monitor.

## 2018-08-09 NOTE — THERAPY
"Physical Therapy Evaluation completed.   Bed Mobility:  Supine to Sit: Independent  Transfers: Sit to Stand: Supervised  Gait: Level Of Assist: Supervised with No Equipment Needed   X 200 feet   Plan of Care: Patient with no further skilled PT needs in the acute care setting at this time  Discharge Recommendations: Equipment: No Equipment Needed. Post-acute therapy Out Pt PT for BPPV    See \"Rehab Therapy-Acute\" Patient Summary Report for complete documentation.     "

## 2018-08-09 NOTE — H&P
Hospital Medicine History & Physical Note    Date of Service  8/9/2018    Primary Care Physician  ALVINO Harman    Consultants  None    Code Status  Full code    Chief Complaint  Vertigo    History of Presenting Illness  67 y.o. male with a past medical history of CAD status post stent placement, paroxysmal atrial fibrillation on Eliquis, stroke, polycythemia, carotid stenosis status post right CEA in March 2018, hypertension, hyperlipidemia who presented 8/9/2018 with sudden onset of vertigo that started yesterday.  Patient developed severe spinning sensation with associated nausea and vomiting.  The patient reported constant vomiting for the last 2 hours.  His vertigo would worsen with any sort of movement and has been unable to ambulate.  He reports a mild headache.  He denies any vision changes, focal muscle weakness, numbness, tingling, fever, neck stiffness, chest pain, cough, shortness of breath, diarrhea or dysuria.  Patient denies any acute hearing loss.  He denies any tinnitus.  Patient reports significant fatigue and weakness from the excessive vomiting.    Review of Systems  Review of Systems   Constitutional: Positive for malaise/fatigue. Negative for chills, diaphoresis and fever.   HENT: Negative for hearing loss, sore throat and tinnitus.    Eyes: Negative for blurred vision and photophobia.   Respiratory: Negative for cough, sputum production, shortness of breath and wheezing.    Cardiovascular: Negative for chest pain, palpitations and leg swelling.   Gastrointestinal: Positive for nausea and vomiting. Negative for abdominal pain, blood in stool and diarrhea.   Genitourinary: Negative for dysuria, flank pain and urgency.   Musculoskeletal: Negative for back pain, joint pain, myalgias and neck pain.   Skin: Negative for rash.   Neurological: Positive for dizziness and headaches. Negative for sensory change, speech change, focal weakness, seizures and loss of consciousness.  "  Endo/Heme/Allergies: Does not bruise/bleed easily.   Psychiatric/Behavioral: Negative for suicidal ideas.   All other systems reviewed and are negative.      Past Medical History   has a past medical history of Anesthesia; Cancer (HCC); Diabetes (HCC); Family history of punctured lung (2002); Heart attack ([facility]) (03/2017); Hemorrhagic disorder ([facility]); High cholesterol; Ischemic cardiomyopathy; Kidney stones; Orthostatic hypotension (3/29/2018); Pain; Polycythemia vera(238.4); Stroke ([facility]) (2016); Urinary bladder disorder; and Vertigo.    Surgical History   has a past surgical history that includes other cardiac surgery; cath placement; laminotomy; appendectomy; carotid endarterectomy (Right, 3/21/2018); and temporal artery biopsy (Right, 6/26/2018).     Family History  History reviewed.  No pertinent family history    Social History   reports that he has never smoked. He has never used smokeless tobacco. He reports that he does not drink alcohol or use drugs.    Allergies  Allergies   Allergen Reactions   • Beta Adrenergic Blockers Unspecified     dizziness   • Metformin Unspecified     \"Similar to a heart attack, I was hospitalized\"   • Simvastatin      Myalgias   • Statins [Hmg-Coa-R Inhibitors]      Muscle ache, joint pain       Medications  Prior to Admission Medications   Prescriptions Last Dose Informant Patient Reported? Taking?   apixaban (ELIQUIS) 5mg Tab 7/24/2018 at 0530 Patient No No   Sig: Take 1 Tab by mouth 2 Times a Day.   aspirin EC (ECOTRIN) 81 MG Tablet Delayed Response   Yes No   Sig: Take 1 Tab by mouth every day.   colesevelam (WELCHOL) 625 MG Tab 7/24/2018 at 0530 Patient Yes No   Sig: Take 625 mg by mouth 2 times a day, with meals.   finasteride (PROSCAR) 5 MG Tab 7/24/2018 at 0530 Patient Yes No   Sig: Take 5 mg by mouth every day.   glipiZIDE (GLUCOTROL) 10 MG Tab 7/24/2018 at am Patient Yes No   Sig: Take 10 mg by mouth every day.   hydroxyurea (HYDREA) 500 MG Cap 6/24/2018 at pm Patient " Yes No   Sig: Take 1,500 mg by mouth every evening. Alternates taking 1000 mg one day and 1500 the next day   insulin glargine (LANTUS) 100 UNIT/ML Solution 7/24/2018 at pm Patient Yes No   Sig: Inject 10 Units as instructed every evening.   tamsulosin (FLOMAX) 0.4 MG capsule 7/24/2018 at pm Patient Yes No   Sig: Take 0.4 mg by mouth every evening.   therapeutic multivitamin-minerals (THERAGRAN-M) Tab 7/24/2018 at am Patient Yes No   Sig: Take 1 Tab by mouth every day.      Facility-Administered Medications: None       Physical Exam  Blood Pressure : (!) 174/81   Temperature: 36.6 °C (97.8 °F)   Pulse: (!) 59   Respiration: 18   Pulse Oximetry: 96 %     Physical Exam   Constitutional: He is oriented to person, place, and time. He appears well-developed and well-nourished. No distress.   HENT:   Head: Normocephalic and atraumatic.   Oral mucous membranes are dry   Eyes: Pupils are equal, round, and reactive to light. Conjunctivae are normal. No scleral icterus.   Neck: Normal range of motion. Neck supple.   Cardiovascular: Normal rate, regular rhythm and normal heart sounds.    Pulmonary/Chest: Effort normal and breath sounds normal. No respiratory distress. He has no wheezes. He has no rales.   Abdominal: Soft. Bowel sounds are normal. He exhibits no distension. There is no tenderness. There is no rebound.   Musculoskeletal: Normal range of motion. He exhibits no edema or tenderness.   Lymphadenopathy:     He has no cervical adenopathy.   Neurological: He is alert and oriented to person, place, and time. No cranial nerve deficit. Coordination normal.   Strength and sensation intact bilaterally  Unable to assess gait at this time   Skin: Skin is warm. No rash noted.   Psychiatric: He has a normal mood and affect. His behavior is normal.   Nursing note and vitals reviewed.      Laboratory:  Recent Labs      08/09/18   0043   WBC  4.1*   RBC  4.60*   HEMOGLOBIN  13.8*   HEMATOCRIT  44.2   MCV  96.1   MCH  30.0   MCHC   31.2*   RDW  56.9*   PLATELETCT  223   MPV  11.0     Recent Labs      08/09/18   0043   SODIUM  135   POTASSIUM  3.7   CHLORIDE  103   CO2  20   GLUCOSE  312*   BUN  18   CREATININE  1.06   CALCIUM  9.3     Recent Labs      08/09/18   0043   ALTSGPT  21   ASTSGOT  21   ALKPHOSPHAT  45   TBILIRUBIN  0.9   LIPASE  47   GLUCOSE  312*     Recent Labs      08/09/18   0043   APTT  30.5   INR  1.22*             Lab Results   Component Value Date    TROPONINI 0.03 08/09/2018       Urinalysis:    Recent Labs      08/09/18   0056   SPECGRAVITY  1.025   GLUCOSEUR  >=1000*   KETONES  Trace*   NITRITE  Negative   LEUKESTERAS  Negative   WBCURINE  0-2*   RBCURINE  2-5*   BACTERIA  Negative   EPITHELCELL  Negative        Imaging:  CT-CTA NECK WITH & W/O-POST PROCESSING   Final Result      1.  Mild atherosclerotic narrowing of the carotid bulbs and proximal internal carotid arteries bilaterally.   2.  No focal stenosis, dissection or occlusion of the cervical carotid or vertebral arteries.   3.  Hypoplastic LEFT vertebral artery.      CT-CTA HEAD WITH & W/O-POST PROCESS   Final Result      1.  Chronic RIGHT occipital infarct.   2.  No acute intracranial hemorrhage.   3.  No intracranial aneurysm, focal stenosis or abrupt large vessel cut off.   4.  Hypoplastic LEFT vertebral artery.      CT-HEAD W/O   Final Result      1.  Diffuse atrophy and white matter changes.   2.  No acute intracranial hemorrhage or territorial infarct.   3.  Chronic RIGHT occipital infarct.      DX-CHEST-PORTABLE (1 VIEW)   Final Result      No acute cardiopulmonary disease.      Echocardiogram Comp W/O cont    (Results Pending)   MR-BRAIN-W/O    (Results Pending)         Assessment/Plan:  I anticipate this patient will require at least two midnights for appropriate medical management, necessitating inpatient admission.    Vertigo- (present on admission)   Assessment & Plan    Sudden onset vertigo concerning for acute stroke, rule out other central  etiology  Patient is not a candidate for TPA since he is on Eliquis  Patient will be placed on continuous cardiac monitoring to evaluate for any arrhythmias  I will order CTA head and neck.  I have ordered MRI brain  Check 2-D echo  Patient will be started on full dose aspirin.  Patient cannot tolerate statins  Allow permissive hypertension  Check TSH, lipid panel and hemoglobin A1c  PTOT and ST evaluation  Zofran and Antivert as needed            Essential hypertension- (present on admission)   Assessment & Plan    Allow permissive hypertension        Stenosis of right carotid artery-Right CEA 3/21/18- (present on admission)   Assessment & Plan    Status post right CEA  Continue aspirin and WelChol        History of stroke- old right parietal/occipital- (present on admission)   Assessment & Plan    Continue aspirin and WelChol        CAD (coronary artery disease)- (present on admission)   Assessment & Plan    No active chest pain  Continue aspirin and WelChol        Type 2 diabetes mellitus with complication, without long-term current use of insulin (HCC)- (present on admission)   Assessment & Plan    Start on insulin sliding scale with serial Accu-Cheks  Continue Lantus 10 units nightly  Check hemoglobin A1c  Hypoglycemic protocol in place            Chronic systolic CHF (congestive heart failure) (HCC)- (present on admission)   Assessment & Plan    No evidence of acute exacerbation at this time  Strict I/O  Continue cautious IV fluid hydration in the setting of severe dehydration from persistent nausea and vomiting with lactic acidosis  Monitor respiratory status closely          Paroxysmal atrial fibrillation (HCC)- (present on admission)   Assessment & Plan    Rate well controlled  Continue Eliquis        BPH (benign prostatic hyperplasia)- (present on admission)   Assessment & Plan    Continue Proscar        Nausea and vomiting   Assessment & Plan    Zofran, Compazine and Antivert as needed        Elevated  lactic acid level- (present on admission)   Assessment & Plan    Likely secondary to profound dehydration in the setting of intractable nausea and vomiting  Continue IV fluid hydration with normal saline and trend lactate to resolution            VTE prophylaxis: Eliquis

## 2018-08-09 NOTE — THERAPY
"Occupational Therapy Evaluation completed.   Functional Status:  A&Ox4. Andres catheter in place. BUE AROM-WFL, strength- 4-/5 throughout. Performs bed mobility with SBA. Agreeable to EOB; declines UIC due to fatigue/up all night. Good sit bal, static standing bal. STS with SBA, FWW. Seatd grooming with SBA.    Plan of Care: Will benefit from Occupational Therapy 3 times per week  Discharge Recommendations:  Equipment: Will Continue to Assess for Equipment Needs. Post-acute therapy: TBD. Lives with wife.  See \"Rehab Therapy-Acute\" Patient Summary Report for complete documentation.    "

## 2018-08-09 NOTE — PROGRESS NOTES
0715 Bedside report received & acknowledge from Yissel, (NOC RN). Patient's in bed. IVF patent & infusing. Fall Protocol in effect. No distress noted. Remains NPO.    0728 Echo is being done in patient's room as per MD's order.     0824 Patient refused Welchol & Proscar.    0838 Placed a call to Dr REMY Hong, patient c/o's headache, offered Tylenol , patient requested for Tramadol. Awaiting for response.    0855 Notified Dr REMY Hong of patient;s c/o's headache, refuses Tylenol, requested for Tramadol. New orders received & acknowledged (Tramadol -see MAR), diet order (Consistent Carbohydrates).    0912 Patient's in bed, IVF patent & infusing. Crackers given, medicated with Tramadol & Valium (see MAR) for c/o's headache, rates pain 6/10. No c/o's nausea at this time. Fall Protocol in effect. Call light within reach. FC to DD with clear yellow urine noted. Assessment completed. No distress noted. Plan of care reviewed with the patient.  Questions answered.  Verbalized understanding. SCD's initiated. Admission profile completed. Educated on the importance of IS, patient able to reach 2250 at least 10x every hour while awake.

## 2018-08-09 NOTE — ED NOTES
Bedside report to JOSLYN Dey.  Asst the from stretcher to inpt hospital bed w/ x2 asst , pt kayla.  Fair amb approx 10 ft from the stretcher to the bed.  Care handed to JOSLYN Dey and inpt team.

## 2018-08-09 NOTE — PROGRESS NOTES
1250 Patient's siting up in bed, having lunch. Dr REMY Hong visited. POC discussed with the patient. Questions answered. Verbalized understanding. New order received & acknowledged for MRI & go catheter to be dc'd. D/c plan for the patient to be discharged home per Dr REMY Hong.

## 2018-08-09 NOTE — ED NOTES
Please hold the pt oral antihyperglycemics for 48 hours from now as the pt has been given IV contrast 8/9/18 at 0415.    May resume oral antihyperglycemics on 8/11/18 at 0415.  Thank you,  CT.    Message sent to the pharmacy as well.

## 2018-08-09 NOTE — ASSESSMENT & PLAN NOTE
Sudden onset vertigo concerning for acute stroke, rule out other central etiology  Patient is not a candidate for TPA since he is on Eliquis  Patient will be placed on continuous cardiac monitoring to evaluate for any arrhythmias  I will order CTA head and neck.  I have ordered MRI brain  Check 2-D echo  Patient will be started on full dose aspirin.  Patient cannot tolerate statins  Allow permissive hypertension  Check TSH, lipid panel and hemoglobin A1c  PTOT and ST evaluation  Zofran and Antivert as needed

## 2018-08-09 NOTE — PROGRESS NOTES
1400 Les Zapata worked with the patient.    1500 Patient voided 100 ml of clear yellow urine.    1509 Patient's sitting up in bed, medicated with Tramadol for c/o's headache, rates pain 4/10.

## 2018-08-09 NOTE — PROGRESS NOTES
Received bedside report from Soheila ED RN. Pt x2 assist from rney to bed. Very nauseated. Refusing to do admit profile right now. Can't move pt right now to do skin check so refusing skin check. Pt oriented to room, refusing to let me do assessment. Will speak with Dr about what else can be done for nausea. Pt can't swallow anything but agreed to try one more dose of meclizine per Dr request. Medication given. Pt asked for warm blankets and for me to let him rest for an hour or two before anything else is done. He is very dizzy.

## 2018-08-09 NOTE — ED NOTES
Pt is tolerating the sepsis resuscitation w/o s/s of fluid overload.  Third and final liter of the sepsis bolus completed.

## 2018-08-10 ENCOUNTER — PATIENT OUTREACH (OUTPATIENT)
Dept: HEALTH INFORMATION MANAGEMENT | Facility: OTHER | Age: 68
End: 2018-08-10

## 2018-08-10 LAB — ACETONE SERPL-MCNC: <5 MG/DL

## 2018-08-15 ENCOUNTER — TELEPHONE (OUTPATIENT)
Dept: MEDICAL GROUP | Facility: MEDICAL CENTER | Age: 68
End: 2018-08-15

## 2018-08-15 DIAGNOSIS — R42 VERTIGO: ICD-10-CM

## 2018-08-16 NOTE — TELEPHONE ENCOUNTER
Chikis from [facility] called about this patient.     meclizine (ANTIVERT) 25 MG Tab     This medication has not been doing good to the patient, he has been feeling sick on it, would like to see if there was any alternatives he can try?      Best contact number: [phone]

## 2018-08-16 NOTE — TELEPHONE ENCOUNTER
Spoke to pt, he states he had vertigo months ago. He was in hospital recently with severe vertigo.   He was seen and prescribed physical therapy. He was prescribed the meclizine and its not working. He has had multiple episodes of vomiting.   He has an appointment already scheduled 08/22/2018

## 2018-08-17 RX ORDER — DIAZEPAM 5 MG/1
2.5 TABLET ORAL EVERY 6 HOURS PRN
Qty: 20 TAB | Refills: 0 | Status: SHIPPED
Start: 2018-08-17 | End: 2018-08-22

## 2018-08-17 NOTE — TELEPHONE ENCOUNTER
Spoke with pt by phone, he is not currently having any symptoms.  His concern is that when episodes do come on they are so sudden and severe that he is unable to take an oral medication.  He tends to vomit several times and would be unable to keep the medication down.  We will further discuss at his appointment next week as this does not seem to be a pressing issue today.

## 2018-08-17 NOTE — TELEPHONE ENCOUNTER
Please inform patient we will send in a small amount of diazepam for him to try.  This medication can be sedating, he should not drive after taking it.  This is intended for short-term use, do not use it if not having severe symptoms

## 2018-08-17 NOTE — TELEPHONE ENCOUNTER
Patient notified.  Patient is refusing the pill he says the pill does not do him any good especially when he is on a plane, patient says he has an appointment on Wednesday and would like to talk of other options.

## 2018-08-22 ENCOUNTER — OFFICE VISIT (OUTPATIENT)
Dept: MEDICAL GROUP | Facility: MEDICAL CENTER | Age: 68
End: 2018-08-22
Payer: MEDICARE

## 2018-08-22 ENCOUNTER — TELEPHONE (OUTPATIENT)
Dept: MEDICAL GROUP | Facility: MEDICAL CENTER | Age: 68
End: 2018-08-22

## 2018-08-22 VITALS
SYSTOLIC BLOOD PRESSURE: 118 MMHG | OXYGEN SATURATION: 97 % | HEART RATE: 111 BPM | RESPIRATION RATE: 16 BRPM | BODY MASS INDEX: 27.6 KG/M2 | HEIGHT: 75 IN | WEIGHT: 222 LBS | DIASTOLIC BLOOD PRESSURE: 70 MMHG | TEMPERATURE: 97.8 F

## 2018-08-22 DIAGNOSIS — R42 VERTIGO: ICD-10-CM

## 2018-08-22 DIAGNOSIS — E11.8 TYPE 2 DIABETES MELLITUS WITH COMPLICATION, WITHOUT LONG-TERM CURRENT USE OF INSULIN (HCC): ICD-10-CM

## 2018-08-22 PROBLEM — I10 ESSENTIAL HYPERTENSION: Status: RESOLVED | Noted: 2018-03-21 | Resolved: 2018-08-22

## 2018-08-22 PROCEDURE — 99214 OFFICE O/P EST MOD 30 MIN: CPT | Performed by: NURSE PRACTITIONER

## 2018-08-22 RX ORDER — ALPRAZOLAM 1 MG/1
1-2 TABLET, ORALLY DISINTEGRATING ORAL
Qty: 20 TAB | Refills: 0 | Status: SHIPPED | OUTPATIENT
Start: 2018-08-22 | End: 2018-09-15

## 2018-08-22 RX ORDER — SCOLOPAMINE TRANSDERMAL SYSTEM 1 MG/1
1 PATCH, EXTENDED RELEASE TRANSDERMAL
Qty: 4 PATCH | Refills: 3 | Status: SHIPPED | OUTPATIENT
Start: 2018-08-22 | End: 2018-09-15

## 2018-08-22 RX ORDER — ONDANSETRON 8 MG/1
8 TABLET, ORALLY DISINTEGRATING ORAL EVERY 8 HOURS PRN
Qty: 15 TAB | Refills: 0 | Status: SHIPPED | OUTPATIENT
Start: 2018-08-22 | End: 2018-09-15

## 2018-08-22 NOTE — ASSESSMENT & PLAN NOTE
Intermittent episodes over the last several years with 2 severe episodes recently requiring hospitalization  Severe symptoms, unable to walk, persistent vomiting. Comes  On suddenly with no identifiable trigger although he does mention he was traveling at high altitudes on a few occassions   Full evaluation was done during most recent hospitalization, including MRI. No acute abnormality  Has been given meclizine but this has not been effective as he is unable to hold down medication when having severe episode  Requesting alternative medication to have on hand in the event of another severe episode  Schedule for vestibular therapy next week  Denies otalgia, facial pain/ pressure, severe HA, focal weakness or cognitive change

## 2018-08-22 NOTE — TELEPHONE ENCOUNTER
Pharmacy called for this patient, they said they need a prescription for the brand one for this medication:    scopolamine (TRANSDERM-SCOP) 1 mg/72hr PATCH 72 HR    The pharmacy does not have the generic kind. Can you send in new prescription?

## 2018-08-22 NOTE — ASSESSMENT & PLAN NOTE
Uncontrolled with a1c 8.7  Has been on prednisone for unrelated issues which has increased his glucose  Now on glipizide 10 mg QAM, 5 mg QPM, 15 u Lantus QHS  Fasting glucose

## 2018-08-23 NOTE — PROGRESS NOTES
Subjective:     Chief Complaint   Patient presents with   • Diabetes   • Vertigo     Francesco Schulte is a 68 y.o. male here today to follow up on:    Vertigo  Intermittent episodes over the last several years with 2 severe episodes recently requiring hospitalization  Severe symptoms, unable to walk, persistent vomiting. Comes  On suddenly with no identifiable trigger although he does mention he was traveling at high altitudes on a few occassions   Full evaluation was done during most recent hospitalization, including MRI. No acute abnormality  Has been given meclizine but this has not been effective as he is unable to hold down medication when having severe episode  Requesting alternative medication to have on hand in the event of another severe episode  Schedule for vestibular therapy next week  Denies otalgia, facial pain/ pressure, severe HA, focal weakness or cognitive change    Type 2 diabetes mellitus with complication, without long-term current use of insulin (HCC)  Uncontrolled with a1c 8.7  Has been on prednisone for unrelated issues which has increased his glucose  Now on glipizide 10 mg QAM, 5 mg QPM, 15 u Lantus QHS  Fasting glucose reasonable since d/c of prednisone- 130's generally  Watching his diet  No polyuria, polydipsia       Current medicines (including changes today)  Current Outpatient Prescriptions   Medication Sig Dispense Refill   • scopolamine (TRANSDERM-SCOP) 1 mg/72hr PATCH 72 HR Apply 1 Patch to skin as directed every 72 hours. 4 Patch 3   • ondansetron (ZOFRAN ODT) 8 MG TABLET DISPERSIBLE Take 1 Tab by mouth every 8 hours as needed for Nausea. 15 Tab 0   • alprazolam (NIRAVAM) 1 MG dissolvable tablet Take 1-2 Tabs by mouth Once PRN (severe vertigo) for up to 30 doses. 20 Tab 0   • meclizine (ANTIVERT) 25 MG Tab Take 1 Tab by mouth 3 times a day as needed for Vertigo. 30 Tab 0   • aspirin EC (ECOTRIN) 81 MG Tablet Delayed Response Take 1 Tab by mouth every day. 30 Tab    •  "therapeutic multivitamin-minerals (THERAGRAN-M) Tab Take 1 Tab by mouth every day.     • hydroxyurea (HYDREA) 500 MG Cap Take 1,500 mg by mouth every evening. Alternates taking 1000 mg one day and 1500 the next day     • apixaban (ELIQUIS) 5mg Tab Take 1 Tab by mouth 2 Times a Day. 180 Tab 1   • insulin glargine (LANTUS) 100 UNIT/ML Solution Inject 10 Units as instructed every evening.     • tamsulosin (FLOMAX) 0.4 MG capsule Take 0.4 mg by mouth every evening.     • finasteride (PROSCAR) 5 MG Tab Take 5 mg by mouth every day.     • colesevelam (WELCHOL) 625 MG Tab Take 625 mg by mouth 2 times a day, with meals.     • glipiZIDE (GLUCOTROL) 10 MG Tab Take 10 mg by mouth every day.     • TRANSDERM-SCOP, 1.5 MG, 1 MG/3DAYS PATCH 72 HR Apply 1 Patch to skin as directed every 72 hours. 4 Patch 3     No current facility-administered medications for this visit.      He  has a past medical history of Anesthesia; Cancer ([facility]); Diabetes ([facility]); Family history of punctured lung (2002); Heart attack ([facility]) (03/2017); Hemorrhagic disorder ([facility]); High cholesterol; Ischemic cardiomyopathy; Kidney stones; Orthostatic hypotension (3/29/2018); Pain; Polycythemia vera(238.4); Stroke ([facility]) (2016); Urinary bladder disorder; and Vertigo.    ROS included above     Objective:     Blood pressure 118/70, pulse (!) 111, temperature 36.6 °C (97.8 °F), resp. rate 16, height 1.905 m (6' 3\"), weight 100.7 kg (222 lb), SpO2 97 %. Body mass index is 27.75 kg/m².     Physical Exam:  General: Alert, oriented in no acute distress.  Eye contact is good, speech is normal, affect calm  HEENT: Oral mucosa pink moist, no lesions. TMs gray with good landmarks bilaterally. No lymphadenopathy.  Lungs: clear to auscultation bilaterally, normal effort, no wheeze/ rhonchi/ rales.  CV: regular rate and rhythm, S1, S2, no murmur  Abdomen: soft, nontender  Ext: no edema, color normal, vascularity normal, temperature normal    Assessment and Plan:   The following " treatment plan was discussed   1. Vertigo  H/o severe episodes with uncontrolled vomiting and hospitalization. He is fearful another episode may lead to repeat ER visit. Will provide scopalamine patch, ODT zofran that he may try for recurrence. If not improved with these measures he can try ODT xanax for backup. Pt advised not to drive with medication. Pt to try vestibular therapy next week  scopolamine (TRANSDERM-SCOP) 1 mg/72hr PATCH 72 HR    ondansetron (ZOFRAN ODT) 8 MG TABLET DISPERSIBLE    alprazolam (NIRAVAM) 1 MG dissolvable tablet   2. Type 2 diabetes mellitus with complication, without long-term current use of insulin (HCC)  Uncontrolled, possibly d/t prolonged corticosteroid treatment which is now discontinued. Pt is hopeful glycemic control with improve now off steroid. Plan for 3 month follow up, no medication changes at this time.  LIPID PROFILE    HEMOGLOBIN A1C    COMP METABOLIC PANEL       Followup: 3 months with labs prior         Please note that this dictation was created using voice recognition software. I have worked with consultants from the vendor as well as technical experts from [employer] to optimize the interface. I have made every reasonable attempt to correct obvious errors, but I expect that there are errors of grammar and possibly content that I did not discover before finalizing the note.

## 2018-08-28 ENCOUNTER — PHYSICAL THERAPY (OUTPATIENT)
Dept: PHYSICAL THERAPY | Facility: REHABILITATION | Age: 68
End: 2018-08-28
Attending: HOSPITALIST
Payer: MEDICARE

## 2018-08-28 DIAGNOSIS — R42 DIZZINESS AND GIDDINESS: ICD-10-CM

## 2018-08-28 DIAGNOSIS — R26.89 IMBALANCE: ICD-10-CM

## 2018-08-28 PROCEDURE — G8978 MOBILITY CURRENT STATUS: HCPCS | Mod: CK

## 2018-08-28 PROCEDURE — G8979 MOBILITY GOAL STATUS: HCPCS | Mod: CJ

## 2018-08-28 PROCEDURE — 97535 SELF CARE MNGMENT TRAINING: CPT

## 2018-08-28 PROCEDURE — 97161 PT EVAL LOW COMPLEX 20 MIN: CPT

## 2018-08-28 NOTE — OP THERAPY EVALUATION
"  Outpatient Physical Therapy  VESTIBULAR EVALUATION    [address]  [address]  [facility] [address]  Phone:  [phone]  Fax:  [phone]    Date of Evaluation: 2018    Patient: Francesco Schulte  YOB: 1950  MRN: [id number]     Referring Provider: Adin Hong M.D.  [address] [place] [address]   Referring Diagnosis: Vertigo [R42]     Time Calculation  Start time: 1600  Stop time: 1654 Time Calculation (min): 54 minutes     Physical Therapy Occurrence Codes    Date of onset of impairment:  18   Date physical therapy care plan established or reviewed:  18   Date physical therapy treatment started:  18          Chief Complaint: Vertigo    Visit Diagnoses     ICD-10-CM   1. Imbalance R26.89   2. Dizziness and giddiness R42         History of Present Illness:     Mechanism of injury:  Pt presents to PT with complaint of severe dizziness. States the first episode was about 3 years ago, spontaneous onset while driving but passed quickly.  2018 had another spontaneous episode that was \"totaly debilitating.\"  Was at home, the room flipped \"upside down.\" (Always seems to go clockwise).  Vomiting constant for 2 hrs and taken to ED with 2 day hospital stay. Aug 9, 2018: again while driving.  Similar sensations as previous. CT scans WNL and suspected severe vertigo.      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     Sleep disturbance:  Not disrupted  Symptoms:     Current symptom rating:  3    At best symptom ratin    At worst symptom rating:  10    Progression:  Stable  Social Support:     Lives in:  One-story house    Lives with:  Alone  Diagnostic Tests:     CT scan: abnormal (chronic R occipital infarct- pt reports no remaining visual deficts from this )    Patient goals:     Patient goals for therapy:  Increased motion    Other patient goals:  Resolve " vertigo      Past Medical History:   Diagnosis Date   • Anesthesia     resistant to pain meds   • Cancer (HCC)     polycythemia vera   • Diabetes (HCC)     insulin and oral meds   • Family history of punctured lung 2002    left lung   • Heart attack (HCC) 03/2017   • Hemorrhagic disorder (HCC)     on blood thinners   • High cholesterol    • Ischemic cardiomyopathy    • Kidney stones     hx of kidney stones   • Orthostatic hypotension 3/29/2018   • Pain     headache pain   • Polycythemia vera(238.4)    • Stroke (HCC) 2016    r/t afib; eye issues resolved afterward   • Urinary bladder disorder     enlarged prostate, weak stream, difficulty urinating   • Vertigo      Past Surgical History:   Procedure Laterality Date   • TEMPORAL ARTERY BIOPSY Right 6/26/2018    Procedure: TEMPORAL ARTERY BIOPSY;  Surgeon: Rajendra Aguilar M.D.;  Location: SURGERY SAME DAY [facility];  Service: General   • CAROTID ENDARTERECTOMY Right 3/21/2018    Procedure: CAROTID ENDARTERECTOMY- W/EEG MONITORING;  Surgeon: Benny Morfin M.D.;  Location: SURGERY [facility];  Service: General   • APPENDECTOMY     • CATH PLACEMENT      right arm   • LAMINOTOMY      diskectomy; C4   • OTHER CARDIAC SURGERY      stents x 3     Social History   Substance Use Topics   • Smoking status: Never Smoker   • Smokeless tobacco: Never Used   • Alcohol use No     Family and Occupational History     Social History   • Marital status:      Spouse name: N/A   • Number of children: N/A   • Years of education: N/A       Objective:    Gait:     Assessment: difficulty with concurrent head rotation  Vestibulospinal Exam:     MCTSIB:         Firm, eyes open: within normal limits        Firm, eyes closed: 104% below normal (no neuropathy, but does have a history of chronic ankle sprains)        Foam, eyes open: within normal limits        Foam, eyes closed: 6% below normal        Composite Score: 7% below normal      Oculomotor Exam:         Details: No  spontaneous nystagmus central gaze          Details: No spontaneous nystagmus eccentric gaze    No saccadic eye movements    Smooth pursuit present    Convergence:        Normal convergence    No skew    Comments: Frenzel exam: mild G1 nystagmus with R gaze.   Active Range of Motion:     Active range of motion comments: Limited c/s due to chronic fusion  Limb Ataxia Exam:     Finger-to-Nose:         Intention tremor: none    Dysdiadochokinesia: none.  Strength Exam:     Upper extremities within functional limits    Lower extremities within functional limits  Reflex Exam:       Deep Tendon Reflexes:         Left L4 (Patellar): trace (1+)        Right L4 (Patellar): trace (1+)  Sensation Tests:     Left Light Touch Sensation:         All left lumbar dermatomes intact      Right Light Touch Sensation:         All right lumbar dermatomes intact      Vestibulo-Ocular Exam:     Abnormal head thrust test        Details: corrective saccade on head moving right  BPPV Exam:     Normal Annette-Hallpike    Normal straight head-hanging test    Normal roll test        Therapeutic Treatments and Modalities:     1. Functional Training, Self Care (CPT 59572), Education: sources of vertigo, central vs peripheral signs.  Peripheral hypofunction, VRT goals and prognosis.  What further testing is available (ENG).     Time-based treatments/modalities:  Functional training, self care minutes (CPT 63290): 15 minutes         Assessment:     Functional impairments:  Decreased postural stability, gait abnormality/instability, decreased gaze stabilization and decreased utilization of VIS/vest/somato    Assessment details:  Mr. Schulte is a 68 y.o male who presents to PT with complaint of episodic severe vertigo.  Complicating PMH of agent orange exposure and polycythemia.  PT evaluation is negative for BPPV and more consistent with R peripheral hypofunction. He has decreased gaze stability with VOR and decreased balance control with vestibular  dominant conditions.  Pt ordinarily has a very active lifestyle with travel and is feeling limited due to fear of having an episode on the plane.  Skilled PT services are indicated to address the above mentioned impairments and improve QOL.  Also recommending ENT referral for ENG testing to provide more clarity regarding the diagnosis considering his complex medical history.      Prognosis: good    Goals:     Short term goals:  - Improve VORx1 to 1 hz for 1 min without symptoms  - Able to stand on airex with EC x 30 sec  - Obtain ENT appt      Short term goal time span:  1-2 weeks    Long term goals:  - Improve DHI at least 15%  - Improve MCTSIB to WNL comp score  - Indep with HEP    Long term goal time span:  6-8 weeks  Plan:     Therapy options:  Physical therapy treatment to continue    Planned therapy interventions:  Functional Training, Self Care (CPT 16469), Neuromuscular Re-education (CPT 97664), Therapeutic Activities (CPT 42760), Therapeutic Exercise (CPT 55034) and Gait Training (CPT 27775)    Discussed with:  Patient     Plan details:  1-2x/week for 6 weeks      Functional Limitation G-Codes and Severity Modifiers  PT Functional Assessment Tool Used: DHI  PT Functional Assessment Score: 51%   Current: Mobility: Current (): CK   Goal: Mobility: Goal (): CJ       Referring provider co-signature:  I have reviewed this plan of care and my co-signature certifies the need for services.  Certification Dates:   From 8/29/18     To 10/10/18    Physician Signature: ________________________________ Date: ______________

## 2018-08-29 DIAGNOSIS — R42 SEVERE VERTIGO: ICD-10-CM

## 2018-08-29 PROCEDURE — G8979 MOBILITY GOAL STATUS: HCPCS | Mod: CJ

## 2018-08-29 PROCEDURE — G8978 MOBILITY CURRENT STATUS: HCPCS | Mod: CK

## 2018-08-29 ASSESSMENT — ENCOUNTER SYMPTOMS
PAIN SCALE AT HIGHEST: 10
PAIN SCALE AT LOWEST: 1
PAIN SCALE: 3

## 2018-08-30 NOTE — PROGRESS NOTES
Outcome: Left Message    Please transfer to Patient Outreach Team at [phone] when patient returns call.    Attempt # 2

## 2018-09-04 ENCOUNTER — APPOINTMENT (OUTPATIENT)
Dept: PHYSICAL THERAPY | Facility: REHABILITATION | Age: 68
End: 2018-09-04
Attending: HOSPITALIST
Payer: MEDICARE

## 2018-09-05 ENCOUNTER — APPOINTMENT (OUTPATIENT)
Dept: PHYSICAL THERAPY | Facility: REHABILITATION | Age: 68
End: 2018-09-05
Payer: MEDICARE

## 2018-09-06 ENCOUNTER — APPOINTMENT (OUTPATIENT)
Dept: PHYSICAL THERAPY | Facility: REHABILITATION | Age: 68
End: 2018-09-06
Attending: HOSPITALIST
Payer: MEDICARE

## 2018-09-10 ENCOUNTER — APPOINTMENT (OUTPATIENT)
Dept: PHYSICAL THERAPY | Facility: REHABILITATION | Age: 68
End: 2018-09-10
Payer: MEDICARE

## 2018-09-10 ENCOUNTER — PATIENT OUTREACH (OUTPATIENT)
Dept: HEALTH INFORMATION MANAGEMENT | Facility: OTHER | Age: 68
End: 2018-09-10

## 2018-09-10 ENCOUNTER — APPOINTMENT (OUTPATIENT)
Dept: PHYSICAL THERAPY | Facility: REHABILITATION | Age: 68
End: 2018-09-10
Attending: HOSPITALIST
Payer: MEDICARE

## 2018-09-12 ENCOUNTER — APPOINTMENT (OUTPATIENT)
Dept: PHYSICAL THERAPY | Facility: REHABILITATION | Age: 68
End: 2018-09-12
Payer: MEDICARE

## 2018-09-13 ENCOUNTER — APPOINTMENT (OUTPATIENT)
Dept: PHYSICAL THERAPY | Facility: REHABILITATION | Age: 68
End: 2018-09-13
Attending: HOSPITALIST
Payer: MEDICARE

## 2018-09-13 NOTE — PROGRESS NOTES
Outcome: requested call back in a few weeks as he is traveling. Mornings are best for call back    Please transfer to Patient Outreach Team at [phone] when patient returns call.    Attempt # 3

## 2018-09-15 ENCOUNTER — HOSPITAL ENCOUNTER (EMERGENCY)
Facility: MEDICAL CENTER | Age: 68
End: 2018-09-15
Attending: EMERGENCY MEDICINE
Payer: MEDICARE

## 2018-09-15 ENCOUNTER — APPOINTMENT (OUTPATIENT)
Dept: RADIOLOGY | Facility: MEDICAL CENTER | Age: 68
End: 2018-09-15
Attending: EMERGENCY MEDICINE
Payer: MEDICARE

## 2018-09-15 VITALS
TEMPERATURE: 97.8 F | HEIGHT: 75 IN | BODY MASS INDEX: 27.38 KG/M2 | WEIGHT: 220.24 LBS | HEART RATE: 78 BPM | OXYGEN SATURATION: 96 % | SYSTOLIC BLOOD PRESSURE: 142 MMHG | DIASTOLIC BLOOD PRESSURE: 91 MMHG | RESPIRATION RATE: 20 BRPM

## 2018-09-15 DIAGNOSIS — M25.519 ARTHRALGIA OF SHOULDER, UNSPECIFIED LATERALITY: ICD-10-CM

## 2018-09-15 LAB
ALBUMIN SERPL BCP-MCNC: 3.8 G/DL (ref 3.2–4.9)
ALBUMIN/GLOB SERPL: 1.1 G/DL
ALP SERPL-CCNC: 45 U/L (ref 30–99)
ALT SERPL-CCNC: 19 U/L (ref 2–50)
ANION GAP SERPL CALC-SCNC: 8 MMOL/L (ref 0–11.9)
APTT PPP: 32.8 SEC (ref 24.7–36)
AST SERPL-CCNC: 20 U/L (ref 12–45)
BASOPHILS # BLD AUTO: 0.8 % (ref 0–1.8)
BASOPHILS # BLD: 0.05 K/UL (ref 0–0.12)
BILIRUB SERPL-MCNC: 1.1 MG/DL (ref 0.1–1.5)
BUN SERPL-MCNC: 28 MG/DL (ref 8–22)
CALCIUM SERPL-MCNC: 8.8 MG/DL (ref 8.4–10.2)
CHLORIDE SERPL-SCNC: 106 MMOL/L (ref 96–112)
CO2 SERPL-SCNC: 21 MMOL/L (ref 20–33)
CREAT SERPL-MCNC: 0.95 MG/DL (ref 0.5–1.4)
CRP SERPL HS-MCNC: 0.68 MG/DL (ref 0–0.75)
EOSINOPHIL # BLD AUTO: 0.09 K/UL (ref 0–0.51)
EOSINOPHIL NFR BLD: 1.5 % (ref 0–6.9)
ERYTHROCYTE [DISTWIDTH] IN BLOOD BY AUTOMATED COUNT: 60.6 FL (ref 35.9–50)
ERYTHROCYTE [SEDIMENTATION RATE] IN BLOOD BY WESTERGREN METHOD: 29 MM/HOUR (ref 0–20)
GLOBULIN SER CALC-MCNC: 3.6 G/DL (ref 1.9–3.5)
GLUCOSE SERPL-MCNC: 179 MG/DL (ref 65–99)
HCT VFR BLD AUTO: 43.5 % (ref 42–52)
HGB BLD-MCNC: 14 G/DL (ref 14–18)
IMM GRANULOCYTES # BLD AUTO: 0.04 K/UL (ref 0–0.11)
IMM GRANULOCYTES NFR BLD AUTO: 0.6 % (ref 0–0.9)
INR PPP: 1.4 (ref 0.87–1.13)
LYMPHOCYTES # BLD AUTO: 0.44 K/UL (ref 1–4.8)
LYMPHOCYTES NFR BLD: 7.1 % (ref 22–41)
MCH RBC QN AUTO: 30.4 PG (ref 27–33)
MCHC RBC AUTO-ENTMCNC: 32.2 G/DL (ref 33.7–35.3)
MCV RBC AUTO: 94.6 FL (ref 81.4–97.8)
MONOCYTES # BLD AUTO: 0.41 K/UL (ref 0–0.85)
MONOCYTES NFR BLD AUTO: 6.7 % (ref 0–13.4)
NEUTROPHILS # BLD AUTO: 5.13 K/UL (ref 1.82–7.42)
NEUTROPHILS NFR BLD: 83.3 % (ref 44–72)
NRBC # BLD AUTO: 0 K/UL
NRBC BLD-RTO: 0 /100 WBC
PLATELET # BLD AUTO: 153 K/UL (ref 164–446)
PMV BLD AUTO: 12.8 FL (ref 9–12.9)
POTASSIUM SERPL-SCNC: 3.4 MMOL/L (ref 3.6–5.5)
PROT SERPL-MCNC: 7.4 G/DL (ref 6–8.2)
PROTHROMBIN TIME: 17 SEC (ref 12–14.6)
RBC # BLD AUTO: 4.6 M/UL (ref 4.7–6.1)
SODIUM SERPL-SCNC: 135 MMOL/L (ref 135–145)
TROPONIN I SERPL-MCNC: 0.02 NG/ML (ref 0–0.04)
WBC # BLD AUTO: 6.2 K/UL (ref 4.8–10.8)

## 2018-09-15 PROCEDURE — 80053 COMPREHEN METABOLIC PANEL: CPT

## 2018-09-15 PROCEDURE — 85652 RBC SED RATE AUTOMATED: CPT

## 2018-09-15 PROCEDURE — 700111 HCHG RX REV CODE 636 W/ 250 OVERRIDE (IP)

## 2018-09-15 PROCEDURE — 85730 THROMBOPLASTIN TIME PARTIAL: CPT

## 2018-09-15 PROCEDURE — 700111 HCHG RX REV CODE 636 W/ 250 OVERRIDE (IP): Performed by: EMERGENCY MEDICINE

## 2018-09-15 PROCEDURE — 99284 EMERGENCY DEPT VISIT MOD MDM: CPT

## 2018-09-15 PROCEDURE — 96375 TX/PRO/DX INJ NEW DRUG ADDON: CPT

## 2018-09-15 PROCEDURE — 85610 PROTHROMBIN TIME: CPT

## 2018-09-15 PROCEDURE — 85025 COMPLETE CBC W/AUTO DIFF WBC: CPT

## 2018-09-15 PROCEDURE — 36415 COLL VENOUS BLD VENIPUNCTURE: CPT

## 2018-09-15 PROCEDURE — 84484 ASSAY OF TROPONIN QUANT: CPT

## 2018-09-15 PROCEDURE — 86140 C-REACTIVE PROTEIN: CPT

## 2018-09-15 PROCEDURE — 71045 X-RAY EXAM CHEST 1 VIEW: CPT

## 2018-09-15 PROCEDURE — 96374 THER/PROPH/DIAG INJ IV PUSH: CPT

## 2018-09-15 RX ORDER — METHYLPREDNISOLONE SODIUM SUCCINATE 125 MG/2ML
125 INJECTION, POWDER, LYOPHILIZED, FOR SOLUTION INTRAMUSCULAR; INTRAVENOUS ONCE
Status: COMPLETED | OUTPATIENT
Start: 2018-09-15 | End: 2018-09-15

## 2018-09-15 RX ORDER — KETOROLAC TROMETHAMINE 30 MG/ML
30 INJECTION, SOLUTION INTRAMUSCULAR; INTRAVENOUS ONCE
Status: COMPLETED | OUTPATIENT
Start: 2018-09-15 | End: 2018-09-15

## 2018-09-15 RX ORDER — KETOROLAC TROMETHAMINE 30 MG/ML
INJECTION, SOLUTION INTRAMUSCULAR; INTRAVENOUS
Status: COMPLETED
Start: 2018-09-15 | End: 2018-09-15

## 2018-09-15 RX ADMIN — METHYLPREDNISOLONE SODIUM SUCCINATE 125 MG: 125 INJECTION, POWDER, FOR SOLUTION INTRAMUSCULAR; INTRAVENOUS at 12:49

## 2018-09-15 RX ADMIN — KETOROLAC TROMETHAMINE 30 MG: 30 INJECTION, SOLUTION INTRAMUSCULAR; INTRAVENOUS at 13:34

## 2018-09-15 RX ADMIN — KETOROLAC TROMETHAMINE 30 MG: 30 INJECTION, SOLUTION INTRAMUSCULAR at 13:34

## 2018-09-15 ASSESSMENT — PAIN SCALES - GENERAL
PAINLEVEL_OUTOF10: 3
PAINLEVEL_OUTOF10: 5

## 2018-09-15 NOTE — ED PROVIDER NOTES
ED Provider Note    CHIEF COMPLAINT  Chief Complaint   Patient presents with   • Weakness   • Joint Pain       HPI  Francesco Schulte is a 68 y.o. male who presents with a history of diabetes, coronary disease, ischemic cardiomyopathy, polycythemia vera, he reports severe joint pain in all joints that began 72 hours ago.  He took 20 mg of prednisone that he was given by his rheumatologist when the symptoms started which did not help.  The patient denies chest pain, he denies focal neurologic deficits.  He describes the pain in all joints as severe to the point where he cannot function at home.  He denies any focal strokelike symptoms.    REVIEW OF SYSTEMS  See HPI for further details. All other systems are negative.     PAST MEDICAL HISTORY   has a past medical history of Anesthesia; Cancer ([facility]); Diabetes ([facility]); Family history of punctured lung (2002); Heart attack ([facility]) (03/2017); Hemorrhagic disorder ([facility]); High cholesterol; Ischemic cardiomyopathy; Kidney stones; Orthostatic hypotension (3/29/2018); Pain; Polycythemia vera(238.4); Stroke ([facility]) (2016); Urinary bladder disorder; and Vertigo.    SOCIAL HISTORY  Social History     Social History Main Topics   • Smoking status: Never Smoker   • Smokeless tobacco: Never Used   • Alcohol use No   • Drug use: No   • Sexual activity: Not on file       SURGICAL HISTORY   has a past surgical history that includes other cardiac surgery; cath placement; laminotomy; appendectomy; carotid endarterectomy (Right, 3/21/2018); and temporal artery biopsy (Right, 6/26/2018).    CURRENT MEDICATIONS  Home Medications     Reviewed by Usman Lo (Pharmacy Tech) on 09/15/18 at 1206  Med List Status: Complete   Medication Last Dose Status   apixaban (ELIQUIS) 5mg Tab 9/15/2018 Active   aspirin EC (ECOTRIN) 81 MG Tablet Delayed Response 9/15/2018 Active   colesevelam (WELCHOL) 625 MG Tab 9/15/2018 Active   finasteride (PROSCAR) 5 MG Tab 9/15/2018 Active   glipiZIDE  "(GLUCOTROL) 10 MG Tab 9/15/2018 Active   hydroxyurea (HYDREA) 500 MG Cap 9/15/2018 Active   insulin glargine (LANTUS) 100 UNIT/ML Solution 9/14/2018 Active   tamsulosin (FLOMAX) 0.4 MG capsule 9/14/2018 Active   therapeutic multivitamin-minerals (THERAGRAN-M) Tab 9/14/2018 Active                  ALLERGIES  Allergies   Allergen Reactions   • Beta Adrenergic Blockers Unspecified     dizziness   • Metformin Unspecified     \"Similar to a heart attack, I was hospitalized\"   • Simvastatin      Myalgias   • Statins [Hmg-Coa-R Inhibitors]      Muscle ache, joint pain       PHYSICAL EXAM  VITAL SIGNS: /91   Pulse 78   Temp 36.6 °C (97.8 °F)   Resp 15   Ht 1.905 m (6' 3\")   Wt 99.9 kg (220 lb 3.8 oz)   SpO2 96%   BMI 27.53 kg/m²  @JAMEE[[id number]::@   Pulse ox interpretation: I interpret this pulse ox as normal.  Constitutional: Alert, lying still secondary to joint pain.  HENT: No signs of trauma, Bilateral external ears normal, Nose normal.   Eyes: Pupils are equal and reactive, Conjunctiva normal, Non-icteric.   Neck: Normal range of motion, No tenderness, Supple, No stridor.   Lymphatic: No lymphadenopathy noted.   Cardiovascular: Regular rate and rhythm, no murmurs.   Thorax & Lungs: Normal breath sounds, No respiratory distress, No wheezing, No chest tenderness.   Abdomen: Bowel sounds normal, Soft, No tenderness, No masses, No pulsatile masses. No peritoneal signs.  Skin: Warm, Dry, No erythema, No rash.   Extremities: Intact distal pulses, No edema, No tenderness, No cyanosis.  Musculoskeletal: The patient has no signs of inflammation of the joints, no redness, however when the joints are moved he has pain.  Neurologic: Alert , Normal motor function, Normal sensory function, No focal deficits noted.   Psychiatric: Affect normal, Judgment normal, Mood normal.       DIAGNOSTIC STUDIES / PROCEDURES    EKG  This is a 12-lead EKG interpretation by myself.  It is normal sinus rhythm at a rate of 77.  The axis is " LAD -41°.  The QRS intervals prolonged 116 consistent with LBBB, the patient has evidence of LVH.  There are nonspecific ST-T wave changes.  When compared with EKG from August 9, 2018 there are no significant changes, my impression of this EKG, it does not indicate ischemia nor arrhythmia at this time.    LABS  Labs Reviewed   CBC WITH DIFFERENTIAL - Abnormal; Notable for the following:        Result Value    RBC 4.60 (*)     MCHC 32.2 (*)     RDW 60.6 (*)     Platelet Count 153 (*)     Neutrophils-Polys 83.30 (*)     Lymphocytes 7.10 (*)     Lymphs (Absolute) 0.44 (*)     All other components within normal limits    Narrative:     Indicate which anticoagulants the patient is on:->UNKNOWN   COMP METABOLIC PANEL - Abnormal; Notable for the following:     Potassium 3.4 (*)     Glucose 179 (*)     Bun 28 (*)     Globulin 3.6 (*)     All other components within normal limits    Narrative:     Indicate which anticoagulants the patient is on:->UNKNOWN   PROTHROMBIN TIME - Abnormal; Notable for the following:     PT 17.0 (*)     INR 1.40 (*)     All other components within normal limits    Narrative:     Indicate which anticoagulants the patient is on:->UNKNOWN   WESTERGREN SED RATE - Abnormal; Notable for the following:     Sed Rate Westergren 29 (*)     All other components within normal limits    Narrative:     Indicate which anticoagulants the patient is on:->UNKNOWN   APTT    Narrative:     Indicate which anticoagulants the patient is on:->UNKNOWN   TROPONIN    Narrative:     Indicate which anticoagulants the patient is on:->UNKNOWN   CRP QUANTITIVE (NON-CARDIAC)    Narrative:     Indicate which anticoagulants the patient is on:->UNKNOWN   ESTIMATED GFR    Narrative:     Indicate which anticoagulants the patient is on:->UNKNOWN         RADIOLOGY  DX-CHEST-PORTABLE (1 VIEW)   Final Result      Enlarged cardiac silhouette without evidence of acute disease.              COURSE & MEDICAL DECISION MAKING  Pertinent Labs &  Imaging studies reviewed. (See chart for details)    Differential diagnosis: Electrolyte abnormality, arthritis, inflammatory process    The patient was given Solu-Medrol 125 mg IV and Toradol 30 mg IV.    The patient's labs are unremarkable except for a slightly elevated sed rate.  The patient's symptoms are only slightly better after medication.  The patient will take 4 more days of 60 mg p.o. prednisone and follow-up with his rheumatologist.  He will return for worsening symptoms.  At this point it is not clear to me what the cause of his joint pain is, the patient thinks it might have to do with previous hydroxyurea he was given.  He will up to follow-up with his primary care doctor and his rheumatologist.    The patient will return for new or worsening symptoms and is stable at the time of discharge.    The patient is referred to his primary physician for blood pressure management, diabetic screening, and for all other preventative health concerns.        DISPOSITION:  Patient will be discharged home in stable condition.    FOLLOW UP:  [facility], Emergency Dept  [address]  [facility] [id number] [phone]    If symptoms worsen    Nereyda Moore, SERAP.R.N.  [address]  [address]  [facility] [id number] [phone]      call for follow up      OUTPATIENT MEDICATIONS:  New Prescriptions    No medications on file          The patient will return for worsening symptoms and is stable at the time of discharge. The patient verbalizes understanding and will comply.    FINAL IMPRESSION  1.  Joint pain  2.   3.         Electronically signed by: Ernie Jha, 9/15/2018 11:35 AM

## 2018-09-15 NOTE — ED NOTES
DC instructions given to pt/wife. Pt requesting sling for RUE. ERP - ok with sling. ER Tech at bedside for sling placement. Pt maintains good CMS to RUE. 0 s/s distress noted. Pt dcd home with wife.

## 2018-09-15 NOTE — ED NOTES
IV started in RAC with 20g x1 attempt. Labs drawn from start, sent to lab for processing. Pt cried out in pain with minimal touching of R arm. C/O rotator cuff injury with pain. IV flushed with NS without difficulty.

## 2018-09-15 NOTE — ED NOTES
Pt medicated as directed. IV WNL. Side rail up x1 with call light in reach. Family at bedside. Pt/family deny needs at present.

## 2018-09-16 ENCOUNTER — PATIENT OUTREACH (OUTPATIENT)
Dept: HEALTH INFORMATION MANAGEMENT | Facility: OTHER | Age: 68
End: 2018-09-16

## 2018-09-17 ENCOUNTER — APPOINTMENT (OUTPATIENT)
Dept: PHYSICAL THERAPY | Facility: REHABILITATION | Age: 68
End: 2018-09-17
Payer: MEDICARE

## 2018-09-17 NOTE — PROGRESS NOTES
Francesco Schulte was admitted on 8/9/18 for loss of consciousness/vertigo, once treated he was discharged home on 8/9/18. [facility] patient advocate assisted with multiple discharge needs including 3 appointments, 1 Primary Care Physician appointment, 2 Physical Therapy appointments . Of the 3 appointments the patient kept 2 and cancelled his Physical Therapy appointment on 9/10 due to a scheduling conflict from the patient. Patient is also scheduled for 2 appointments with, Cardiology on 9/26, and Ear Nose and Throat provider @ [facility] on 10/12. Patient advocate also assisted by scheduling patient's physical therapy appointment for an earlier date, and referred patient to his primary care physicians MA for a medication consult.  PPS Screening conducted per inpatient visit notes 80%.

## 2018-09-18 ENCOUNTER — HOSPITAL ENCOUNTER (OUTPATIENT)
Dept: LAB | Facility: MEDICAL CENTER | Age: 68
End: 2018-09-18
Attending: INTERNAL MEDICINE
Payer: MEDICARE

## 2018-09-18 ENCOUNTER — APPOINTMENT (OUTPATIENT)
Dept: PHYSICAL THERAPY | Facility: REHABILITATION | Age: 68
End: 2018-09-18
Attending: HOSPITALIST
Payer: MEDICARE

## 2018-09-18 LAB
CK SERPL-CCNC: 13 U/L (ref 0–154)
CRP SERPL HS-MCNC: 0.84 MG/DL (ref 0–0.75)
ERYTHROCYTE [SEDIMENTATION RATE] IN BLOOD BY WESTERGREN METHOD: 21 MM/HOUR (ref 0–20)
RHEUMATOID FACT SER IA-ACNC: 11 IU/ML (ref 0–14)

## 2018-09-18 PROCEDURE — 86200 CCP ANTIBODY: CPT

## 2018-09-18 PROCEDURE — 86431 RHEUMATOID FACTOR QUANT: CPT

## 2018-09-18 PROCEDURE — 36415 COLL VENOUS BLD VENIPUNCTURE: CPT

## 2018-09-18 PROCEDURE — 82550 ASSAY OF CK (CPK): CPT

## 2018-09-18 PROCEDURE — 85652 RBC SED RATE AUTOMATED: CPT

## 2018-09-18 PROCEDURE — 86140 C-REACTIVE PROTEIN: CPT

## 2018-09-19 ENCOUNTER — APPOINTMENT (OUTPATIENT)
Dept: PHYSICAL THERAPY | Facility: REHABILITATION | Age: 68
End: 2018-09-19
Payer: MEDICARE

## 2018-09-19 NOTE — ADDENDUM NOTE
Encounter addended by: Adin Hong M.D. on: 9/19/2018  1:05 PM<BR>    Actions taken: Charge Capture section accepted

## 2018-09-20 ENCOUNTER — APPOINTMENT (OUTPATIENT)
Dept: PHYSICAL THERAPY | Facility: REHABILITATION | Age: 68
End: 2018-09-20
Attending: HOSPITALIST
Payer: MEDICARE

## 2018-09-20 LAB — CCP IGG SERPL-ACNC: 5 UNITS (ref 0–19)

## 2018-09-24 ENCOUNTER — APPOINTMENT (OUTPATIENT)
Dept: PHYSICAL THERAPY | Facility: REHABILITATION | Age: 68
End: 2018-09-24
Payer: MEDICARE

## 2018-09-25 ENCOUNTER — APPOINTMENT (OUTPATIENT)
Dept: PHYSICAL THERAPY | Facility: REHABILITATION | Age: 68
End: 2018-09-25
Attending: HOSPITALIST
Payer: MEDICARE

## 2018-09-25 NOTE — PROGRESS NOTES
Outcome: traveling  Please transfer to Patient Outreach Team at [phone] when patient returns call.    Attempt # final

## 2018-09-26 ENCOUNTER — APPOINTMENT (OUTPATIENT)
Dept: PHYSICAL THERAPY | Facility: REHABILITATION | Age: 68
End: 2018-09-26
Payer: MEDICARE

## 2018-09-26 ENCOUNTER — OFFICE VISIT (OUTPATIENT)
Dept: CARDIOLOGY | Facility: MEDICAL CENTER | Age: 68
End: 2018-09-26
Payer: MEDICARE

## 2018-09-26 VITALS
HEART RATE: 86 BPM | BODY MASS INDEX: 27.48 KG/M2 | WEIGHT: 221 LBS | HEIGHT: 75 IN | SYSTOLIC BLOOD PRESSURE: 132 MMHG | OXYGEN SATURATION: 95 % | DIASTOLIC BLOOD PRESSURE: 70 MMHG

## 2018-09-26 DIAGNOSIS — I48.0 PAF (PAROXYSMAL ATRIAL FIBRILLATION) (HCC): ICD-10-CM

## 2018-09-26 DIAGNOSIS — I50.22 CHRONIC SYSTOLIC CHF (CONGESTIVE HEART FAILURE) (HCC): ICD-10-CM

## 2018-09-26 DIAGNOSIS — E78.5 DYSLIPIDEMIA: ICD-10-CM

## 2018-09-26 DIAGNOSIS — I25.10 CORONARY ARTERY DISEASE INVOLVING NATIVE CORONARY ARTERY OF NATIVE HEART WITHOUT ANGINA PECTORIS: ICD-10-CM

## 2018-09-26 PROCEDURE — 99215 OFFICE O/P EST HI 40 MIN: CPT | Performed by: INTERNAL MEDICINE

## 2018-09-26 RX ORDER — PREDNISONE 20 MG/1
TABLET ORAL
Refills: 1 | COMMUNITY
Start: 2018-07-05 | End: 2019-01-03 | Stop reason: CLARIF

## 2018-09-26 ASSESSMENT — ENCOUNTER SYMPTOMS
ABDOMINAL PAIN: 0
HEADACHES: 0
PALPITATIONS: 0
PND: 0
DIZZINESS: 0
LOSS OF CONSCIOUSNESS: 0
DEPRESSION: 0
BRUISES/BLEEDS EASILY: 0
ORTHOPNEA: 0
FALLS: 0
MYALGIAS: 1
SHORTNESS OF BREATH: 0

## 2018-09-26 NOTE — PROGRESS NOTES
Subjective:   Francesco Schulte is a 68 y.o. male presenting to clinic for follow-up on his coronary artery disease.    Pertinent history:  Coronary artery disease  2008 - PCI to the LAD, POBA to the diagonal.   2014 - anterior STEMI. POBA to the diagonal. PCI to the proximal circumflex.  Restented again in 2014 after his Plavix was stopped for an injection and patient has recurrent MI.  2016 - recurrent MI. POBA to unknown vessel  2017 - anterior STEMI. Status post PCI to the proximal and mid LAD    Ischemic cardiomyopathy, ejection fraction 45%  Paroxysmal atrial fibrillation, diagnosed 2016. Intolerant to beta blockers  TIA vs. CVA in 2016  Polycythemia vera, essential thrombocytosis  Carotid stenosis status post right CEA in March 2018  Hypertension  Hyperlipidemia, intolerant to statins      After his last visit, patient reports that his dizziness had completely resolved and his blood pressure was staying well within normal.  However about 2 weeks ago he had a sudden onset of muscle weakness in his bilateral lower extremities.  Since then he is back on prednisone.  He feels really unwell due to the chronic diffuse pain.  He has been seen by rheumatologist and has not been able to receive any clear answers as to the cause of his symptoms.    From a cardiac standpoint he has been asymptomatic.  As of 2 weeks ago he was riding his bicycle and having no anginal symptoms.  Denies any heart failure symptoms.  He is on aspirin and Eliquis.  Denies any bleeding issues.  His blood pressure is mostly 120s-130 systolic.  He denies any palpitations or dizziness.  For his hyperlipidemia he continues to be on WelChol which he is tolerating well.    Past Medical History:   Diagnosis Date   • Anesthesia     resistant to pain meds   • Cancer (HCC)     polycythemia vera   • Diabetes (HCC)     insulin and oral meds   • Family history of punctured lung 2002    left lung   • Heart attack (HCC) 03/2017   • Hemorrhagic disorder (HCC)   "   on blood thinners   • High cholesterol    • Ischemic cardiomyopathy    • Kidney stones     hx of kidney stones   • Orthostatic hypotension 3/29/2018   • Pain     headache pain   • Polycythemia vera(238.4)    • Stroke (HCC) 2016    r/t afib; eye issues resolved afterward   • Urinary bladder disorder     enlarged prostate, weak stream, difficulty urinating   • Vertigo      Past Surgical History:   Procedure Laterality Date   • TEMPORAL ARTERY BIOPSY Right 6/26/2018    Procedure: TEMPORAL ARTERY BIOPSY;  Surgeon: Rajendra Aguilar M.D.;  Location: SURGERY SAME DAY [facility];  Service: General   • CAROTID ENDARTERECTOMY Right 3/21/2018    Procedure: CAROTID ENDARTERECTOMY- W/EEG MONITORING;  Surgeon: Benny Morfin M.D.;  Location: SURGERY [facility];  Service: General   • APPENDECTOMY     • CATH PLACEMENT      right arm   • LAMINOTOMY      diskectomy; C4   • OTHER CARDIAC SURGERY      stents x 3     History reviewed. No pertinent family history.     History   Smoking Status   • Never Smoker   Smokeless Tobacco   • Never Used     Allergies   Allergen Reactions   • Beta Adrenergic Blockers Unspecified     dizziness   • Metformin Unspecified     \"Similar to a heart attack, I was hospitalized\"   • Simvastatin      Myalgias   • Statins [Hmg-Coa-R Inhibitors]      Muscle ache, joint pain     Outpatient Encounter Prescriptions as of 9/26/2018   Medication Sig Dispense Refill   • predniSONE (DELTASONE) 20 MG Tab TAKE 3 TABLET ONCE A DAY ORALLY 30 DAY(S)  1   • apixaban (ELIQUIS) 5mg Tab Take 1 Tab by mouth 2 Times a Day. 180 Tab 3   • aspirin EC (ECOTRIN) 81 MG Tablet Delayed Response Take 1 Tab by mouth every day. 30 Tab    • therapeutic multivitamin-minerals (THERAGRAN-M) Tab Take 1 Tab by mouth every day.     • hydroxyurea (HYDREA) 500 MG Cap Take 1,500 mg by mouth every evening. Alternates taking 1000 mg one day and 1500 the next day     • insulin glargine (LANTUS) 100 UNIT/ML Solution Inject 10 Units as " "instructed every evening.     • tamsulosin (FLOMAX) 0.4 MG capsule Take 0.4 mg by mouth every evening.     • finasteride (PROSCAR) 5 MG Tab Take 5 mg by mouth every day.     • colesevelam (WELCHOL) 625 MG Tab Take 625 mg by mouth 2 times a day, with meals.     • glipiZIDE (GLUCOTROL) 10 MG Tab Take 10 mg by mouth every day.     • [DISCONTINUED] apixaban (ELIQUIS) 5mg Tab Take 1 Tab by mouth 2 Times a Day. 180 Tab 1     No facility-administered encounter medications on file as of 9/26/2018.      Review of Systems   Constitutional: Negative for malaise/fatigue.   Respiratory: Negative for shortness of breath.    Cardiovascular: Negative for chest pain, palpitations, orthopnea, leg swelling and PND.   Gastrointestinal: Negative for abdominal pain.   Musculoskeletal: Positive for joint pain and myalgias. Negative for falls.   Skin: Negative.    Neurological: Negative for dizziness, loss of consciousness and headaches.   Endo/Heme/Allergies: Does not bruise/bleed easily.   Psychiatric/Behavioral: Negative for depression.   All other systems reviewed and are negative.       Objective:   /70 (BP Location: Left arm, Patient Position: Sitting, BP Cuff Size: Adult)   Pulse 86   Ht 1.905 m (6' 3\")   Wt 100.2 kg (221 lb)   SpO2 95%   BMI 27.62 kg/m²     Physical Exam   Constitutional: He is oriented to person, place, and time. No distress.   HENT:   Head: Normocephalic and atraumatic.   Eyes: Conjunctivae are normal.   Neck: Normal range of motion. Neck supple. No JVD present.   Cardiovascular: Normal rate, regular rhythm and normal heart sounds.  Exam reveals no gallop and no friction rub.    No murmur heard.  Pulmonary/Chest: Effort normal and breath sounds normal. No respiratory distress. He has no wheezes. He has no rales.   Abdominal: Soft. He exhibits no distension. There is no tenderness.   Musculoskeletal: He exhibits no edema.   Neurological: He is alert and oriented to person, place, and time.   Skin: Skin " is warm and dry. He is not diaphoretic.   Psychiatric: He has a normal mood and affect.   Nursing note and vitals reviewed.     Echocardiogram performed in September 2017 showed a mildly reduced LV function with an EF of 45%. No significant changes from prior study.     Labs performed in September 2018 were reviewed and showed platelets 153.  Hemoglobin 14.0.  Creatinine 0.95.    Echocardiogram performed in August 2018 was personally reviewed and showed a moderately reduced LV systolic function with ejection fraction of about 40%.  No major valvular pathology noted.  No significant changes from prior study.    Assessment:     1. PAF (paroxysmal atrial fibrillation) ([facility])  apixaban (ELIQUIS) 5mg Tab   2. Dyslipidemia     3. Coronary artery disease involving native coronary artery of native heart without angina pectoris     4. Chronic systolic CHF (congestive heart failure) ([facility])         Medical Decision Making:  Today's Assessment / Status / Plan:     Paroxysmal atrial fibrillation: Continues to be regular on exam. Currently not on any AV leslie blocking agents.  Continue Eliquis.  No bleeding issues.    Coronary artery disease status post PCI:  Ischemic cardiomyopathy (EF 45%):  No anginal heart failure symptoms.  Continue aspirin.  He is not any other cardioprotective medications secondary to history of hypotension and dizziness.    Orthostatic hypotension: Symptoms have essentially resolved.  He is staying hydrated.    Hyperlipidemia:  in May 2018.  Continue WelChol at current dose.  He is intolerant to statins.    Return to clinic in 6 months or earlier if needed.    Thank you for allowing me to participate in the care of this patient. Please do not hesitate to contact me with any questions.    Liliana Song MD  Cardiologist  [facility]    PLEASE NOTE: This dictation was created using voice recognition software.

## 2018-09-26 NOTE — LETTER
[facility]   [address]  [place] [address]  Phone: [phone]  Fax: [phone]              Francesco Schulte  1950    Encounter Date: 9/26/2018    Liliana Song M.D.          PROGRESS NOTE:  Subjective:   Francesco Schulte is a 68 y.o. male presenting to clinic for follow-up on his coronary artery disease.    Pertinent history:  Coronary artery disease  2008 - PCI to the LAD, POBA to the diagonal.   2014 - anterior STEMI. POBA to the diagonal. PCI to the proximal circumflex.  Restented again in 2014 after his Plavix was stopped for an injection and patient has recurrent MI.  2016 - recurrent MI. POBA to unknown vessel  2017 - anterior STEMI. Status post PCI to the proximal and mid LAD    Ischemic cardiomyopathy, ejection fraction 45%  Paroxysmal atrial fibrillation, diagnosed 2016. Intolerant to beta blockers  TIA vs. CVA in 2016  Polycythemia vera, essential thrombocytosis  Carotid stenosis status post right CEA in March 2018  Hypertension  Hyperlipidemia, intolerant to statins      After his last visit, patient reports that his dizziness had completely resolved and his blood pressure was staying well within normal.  However about 2 weeks ago he had a sudden onset of muscle weakness in his bilateral lower extremities.  Since then he is back on prednisone.  He feels really unwell due to the chronic diffuse pain.  He has been seen by rheumatologist and has not been able to receive any clear answers as to the cause of his symptoms.    From a cardiac standpoint he has been asymptomatic.  As of 2 weeks ago he was riding his bicycle and having no anginal symptoms.  Denies any heart failure symptoms.  He is on aspirin and Eliquis.  Denies any bleeding issues.  His blood pressure is mostly 120s-130 systolic.  He denies any palpitations or dizziness.  For his hyperlipidemia he continues to be on WelChol which he is tolerating well.    Past Medical  "History:   Diagnosis Date   • Anesthesia     resistant to pain meds   • Cancer (HCC)     polycythemia vera   • Diabetes (HCC)     insulin and oral meds   • Family history of punctured lung 2002    left lung   • Heart attack (HCC) 03/2017   • Hemorrhagic disorder (HCC)     on blood thinners   • High cholesterol    • Ischemic cardiomyopathy    • Kidney stones     hx of kidney stones   • Orthostatic hypotension 3/29/2018   • Pain     headache pain   • Polycythemia vera(238.4)    • Stroke (HCC) 2016    r/t afib; eye issues resolved afterward   • Urinary bladder disorder     enlarged prostate, weak stream, difficulty urinating   • Vertigo      Past Surgical History:   Procedure Laterality Date   • TEMPORAL ARTERY BIOPSY Right 6/26/2018    Procedure: TEMPORAL ARTERY BIOPSY;  Surgeon: Rajendra Aguilar M.D.;  Location: SURGERY SAME DAY [facility];  Service: General   • CAROTID ENDARTERECTOMY Right 3/21/2018    Procedure: CAROTID ENDARTERECTOMY- W/EEG MONITORING;  Surgeon: Benny Morfin M.D.;  Location: SURGERY [facility];  Service: General   • APPENDECTOMY     • CATH PLACEMENT      right arm   • LAMINOTOMY      diskectomy; C4   • OTHER CARDIAC SURGERY      stents x 3     History reviewed. No pertinent family history.     History   Smoking Status   • Never Smoker   Smokeless Tobacco   • Never Used     Allergies   Allergen Reactions   • Beta Adrenergic Blockers Unspecified     dizziness   • Metformin Unspecified     \"Similar to a heart attack, I was hospitalized\"   • Simvastatin      Myalgias   • Statins [Hmg-Coa-R Inhibitors]      Muscle ache, joint pain     Outpatient Encounter Prescriptions as of 9/26/2018   Medication Sig Dispense Refill   • predniSONE (DELTASONE) 20 MG Tab TAKE 3 TABLET ONCE A DAY ORALLY 30 DAY(S)  1   • apixaban (ELIQUIS) 5mg Tab Take 1 Tab by mouth 2 Times a Day. 180 Tab 3   • aspirin EC (ECOTRIN) 81 MG Tablet Delayed Response Take 1 Tab by mouth every day. 30 Tab    • therapeutic " "multivitamin-minerals (THERAGRAN-M) Tab Take 1 Tab by mouth every day.     • hydroxyurea (HYDREA) 500 MG Cap Take 1,500 mg by mouth every evening. Alternates taking 1000 mg one day and 1500 the next day     • insulin glargine (LANTUS) 100 UNIT/ML Solution Inject 10 Units as instructed every evening.     • tamsulosin (FLOMAX) 0.4 MG capsule Take 0.4 mg by mouth every evening.     • finasteride (PROSCAR) 5 MG Tab Take 5 mg by mouth every day.     • colesevelam (WELCHOL) 625 MG Tab Take 625 mg by mouth 2 times a day, with meals.     • glipiZIDE (GLUCOTROL) 10 MG Tab Take 10 mg by mouth every day.     • [DISCONTINUED] apixaban (ELIQUIS) 5mg Tab Take 1 Tab by mouth 2 Times a Day. 180 Tab 1     No facility-administered encounter medications on file as of 9/26/2018.      Review of Systems   Constitutional: Negative for malaise/fatigue.   Respiratory: Negative for shortness of breath.    Cardiovascular: Negative for chest pain, palpitations, orthopnea, leg swelling and PND.   Gastrointestinal: Negative for abdominal pain.   Musculoskeletal: Positive for joint pain and myalgias. Negative for falls.   Skin: Negative.    Neurological: Negative for dizziness, loss of consciousness and headaches.   Endo/Heme/Allergies: Does not bruise/bleed easily.   Psychiatric/Behavioral: Negative for depression.   All other systems reviewed and are negative.       Objective:   /70 (BP Location: Left arm, Patient Position: Sitting, BP Cuff Size: Adult)   Pulse 86   Ht 1.905 m (6' 3\")   Wt 100.2 kg (221 lb)   SpO2 95%   BMI 27.62 kg/m²      Physical Exam   Constitutional: He is oriented to person, place, and time. No distress.   HENT:   Head: Normocephalic and atraumatic.   Eyes: Conjunctivae are normal.   Neck: Normal range of motion. Neck supple. No JVD present.   Cardiovascular: Normal rate, regular rhythm and normal heart sounds.  Exam reveals no gallop and no friction rub.    No murmur heard.  Pulmonary/Chest: Effort normal and " breath sounds normal. No respiratory distress. He has no wheezes. He has no rales.   Abdominal: Soft. He exhibits no distension. There is no tenderness.   Musculoskeletal: He exhibits no edema.   Neurological: He is alert and oriented to person, place, and time.   Skin: Skin is warm and dry. He is not diaphoretic.   Psychiatric: He has a normal mood and affect.   Nursing note and vitals reviewed.     Echocardiogram performed in September 2017 showed a mildly reduced LV function with an EF of 45%. No significant changes from prior study.     Labs performed in September 2018 were reviewed and showed platelets 153.  Hemoglobin 14.0.  Creatinine 0.95.    Echocardiogram performed in August 2018 was personally reviewed and showed a moderately reduced LV systolic function with ejection fraction of about 40%.  No major valvular pathology noted.  No significant changes from prior study.    Assessment:     1. PAF (paroxysmal atrial fibrillation) ([facility])  apixaban (ELIQUIS) 5mg Tab   2. Dyslipidemia     3. Coronary artery disease involving native coronary artery of native heart without angina pectoris     4. Chronic systolic CHF (congestive heart failure) ([facility])         Medical Decision Making:  Today's Assessment / Status / Plan:     Paroxysmal atrial fibrillation: Continues to be regular on exam. Currently not on any AV leslie blocking agents.  Continue Eliquis.  No bleeding issues.    Coronary artery disease status post PCI:  Ischemic cardiomyopathy (EF 45%):  No anginal heart failure symptoms.  Continue aspirin.  He is not any other cardioprotective medications secondary to history of hypotension and dizziness.    Orthostatic hypotension: Symptoms have essentially resolved.  He is staying hydrated.    Hyperlipidemia:  in May 2018.  Continue WelChol at current dose.  He is intolerant to statins.    Return to clinic in 6 months or earlier if needed.    Thank you for allowing me to participate in the care of this patient.  Please do not hesitate to contact me with any questions.    Liliana Song MD  Cardiologist  [facility]    PLEASE NOTE: This dictation was created using voice recognition software.         NOE Harman.R.N.  [address]  [address]  [place] [address]  VIA In Basket

## 2018-10-01 ENCOUNTER — APPOINTMENT (OUTPATIENT)
Dept: PHYSICAL THERAPY | Facility: REHABILITATION | Age: 68
End: 2018-10-01
Payer: MEDICARE

## 2018-10-03 ENCOUNTER — OFFICE VISIT (OUTPATIENT)
Dept: MEDICAL GROUP | Facility: MEDICAL CENTER | Age: 68
End: 2018-10-03
Payer: MEDICARE

## 2018-10-03 VITALS
TEMPERATURE: 97.3 F | OXYGEN SATURATION: 99 % | RESPIRATION RATE: 16 BRPM | WEIGHT: 217 LBS | DIASTOLIC BLOOD PRESSURE: 92 MMHG | SYSTOLIC BLOOD PRESSURE: 134 MMHG | BODY MASS INDEX: 26.98 KG/M2 | HEIGHT: 75 IN | HEART RATE: 93 BPM

## 2018-10-03 DIAGNOSIS — R42 VERTIGO: ICD-10-CM

## 2018-10-03 DIAGNOSIS — D75.1 POLYCYTHEMIA: ICD-10-CM

## 2018-10-03 DIAGNOSIS — E11.8 TYPE 2 DIABETES MELLITUS WITH COMPLICATION, WITHOUT LONG-TERM CURRENT USE OF INSULIN (HCC): ICD-10-CM

## 2018-10-03 DIAGNOSIS — R70.0 ELEVATED SED RATE: ICD-10-CM

## 2018-10-03 DIAGNOSIS — Z77.098 AGENT ORANGE EXPOSURE: ICD-10-CM

## 2018-10-03 DIAGNOSIS — M25.50 MULTIPLE JOINT PAIN: ICD-10-CM

## 2018-10-03 PROCEDURE — 99214 OFFICE O/P EST MOD 30 MIN: CPT | Performed by: NURSE PRACTITIONER

## 2018-10-03 NOTE — LETTER
[facility]  SATNAM HarmanRYeniN.  [address]  [place] [address]  Fax: [phone]   Authorization for Release/Disclosure of   Protected Health Information   Name: FRANCESCO ROMEO : 1950 SSN: [id number]   Address: [address] [place] [address] [place] [address] Phone:    [phone] (home)    I authorize the entity listed below to release/disclose the PHI below to:   [facility]/NOE Harman.RRIANA and ALVINO Harman   Provider or Entity Name:  Dr Sims, rheumatology   Address   City, State, [facility]   Phone:      Fax:     Reason for request: continuity of care   Information to be released:    [  ] LAST COLONOSCOPY,  including any PATH REPORT and follow-up  [  ] LAST FIT/COLOGUARD RESULT [  ] LAST DEXA  [  ] LAST MAMMOGRAM  [  ] LAST PAP  [  ] LAST LABS [  ] RETINA EXAM REPORT  [  ] IMMUNIZATION RECORDS  [  ] Release all info      [  ] Check here and initial the line next to each item to release ALL health information INCLUDING  _____ Care and treatment for drug and / or alcohol abuse  _____ HIV testing, infection status, or AIDS  _____ Genetic Testing    DATES OF SERVICE OR TIME PERIOD TO BE DISCLOSED: _____________  I understand and acknowledge that:  * This Authorization may be revoked at any time by you in writing, except if your health information has already been used or disclosed.  * Your health information that will be used or disclosed as a result of you signing this authorization could be re-disclosed by the recipient. If this occurs, your re-disclosed health information may no longer be protected by State or Federal laws.  * You may refuse to sign this Authorization. Your refusal will not affect your ability to obtain treatment.  * This Authorization becomes effective upon signing and will  on (date) __________.      If no date is indicated, this Authorization will  one (1) year from the signature date.    Name: Francesco York  Eris    Signature:   Date:     10/3/2018       PLEASE FAX REQUESTED RECORDS BACK TO: [phone]

## 2018-10-03 NOTE — LETTER
[facility]  SATNAM HarmanRYeniN.  [address]  [place] [address]  Fax: [phone]   Authorization for Release/Disclosure of   Protected Health Information   Name: FRANCESCO ROMEO : 1950 SSN: [id number]   Address: [address] [place] [address] [place] [address] Phone:    [phone] (home)    I authorize the entity listed below to release/disclose the PHI below to:   [facility]/NOE Harman.RRIANA and ALVINO Harman   Provider or Entity Name:  Dr Sims, Oncology   Address   City, State, [facility]   Phone:      Fax:     Reason for request: continuity of care   Information to be released:    [  ] LAST COLONOSCOPY,  including any PATH REPORT and follow-up  [  ] LAST FIT/COLOGUARD RESULT [  ] LAST DEXA  [  ] LAST MAMMOGRAM  [  ] LAST PAP  [  ] LAST LABS [  ] RETINA EXAM REPORT  [  ] IMMUNIZATION RECORDS  [x ] Release all info      [  ] Check here and initial the line next to each item to release ALL health information INCLUDING  _____ Care and treatment for drug and / or alcohol abuse  _____ HIV testing, infection status, or AIDS  _____ Genetic Testing    DATES OF SERVICE OR TIME PERIOD TO BE DISCLOSED: _____________  I understand and acknowledge that:  * This Authorization may be revoked at any time by you in writing, except if your health information has already been used or disclosed.  * Your health information that will be used or disclosed as a result of you signing this authorization could be re-disclosed by the recipient. If this occurs, your re-disclosed health information may no longer be protected by State or Federal laws.  * You may refuse to sign this Authorization. Your refusal will not affect your ability to obtain treatment.  * This Authorization becomes effective upon signing and will  on (date) __________.      If no date is indicated, this Authorization will  one (1) year from the signature date.    Name: Francesco Romeo    Signature:   Date:     10/3/2018       PLEASE FAX REQUESTED RECORDS BACK TO: [phone]

## 2018-10-03 NOTE — LETTER
[facility]  SATNAM HarmanRYeniN.  [address]  [place] [address]  Fax: [phone]   Authorization for Release/Disclosure of   Protected Health Information   Name: FRANCESCO SCHULTE : 1950 SSN: [id number]   Address: [address] [place] [address] [place] [address] Phone:    [phone] (home)    I authorize the entity listed below to release/disclose the PHI below to:   [facility]/NOE Harman.RRIANA and ALVINO Harman   Provider or Entity Name:  JIM Rasheed   Address   City, State, [facility]   Phone:      Fax:     Reason for request: continuity of care   Information to be released:    [  ] LAST COLONOSCOPY,  including any PATH REPORT and follow-up  [  ] LAST FIT/COLOGUARD RESULT [  ] LAST DEXA  [  ] LAST MAMMOGRAM  [  ] LAST PAP  [  ] LAST LABS [  ] RETINA EXAM REPORT  [  ] IMMUNIZATION RECORDS  [  ] Release all info      [  ] Check here and initial the line next to each item to release ALL health information INCLUDING  _____ Care and treatment for drug and / or alcohol abuse  _____ HIV testing, infection status, or AIDS  _____ Genetic Testing    DATES OF SERVICE OR TIME PERIOD TO BE DISCLOSED: _____________  I understand and acknowledge that:  * This Authorization may be revoked at any time by you in writing, except if your health information has already been used or disclosed.  * Your health information that will be used or disclosed as a result of you signing this authorization could be re-disclosed by the recipient. If this occurs, your re-disclosed health information may no longer be protected by State or Federal laws.  * You may refuse to sign this Authorization. Your refusal will not affect your ability to obtain treatment.  * This Authorization becomes effective upon signing and will  on (date) __________.      If no date is indicated, this Authorization will  one (1) year from the signature date.    Name: Francesco Schulte    Signature:   Date:     10/3/2018       PLEASE FAX REQUESTED RECORDS BACK TO: [phone]

## 2018-10-04 NOTE — ASSESSMENT & PLAN NOTE
Concerned this may be contributing to multitude of complaints including severe vertigo, sudden and severe episodes of

## 2018-10-04 NOTE — ASSESSMENT & PLAN NOTE
"Sudden onset of severe multiple joint pain 3 weeks ago prompting ER visit. 3 days where he \"could not even walk\" or get out of bed  Notes and labs reviewed  "

## 2018-10-04 NOTE — ASSESSMENT & PLAN NOTE
Intermittent episodes of severe vertigo with n/v, sudden onset. Last episode in Aug.  Seen recently by Dr. Griffith who ruled out meniere's disease, felt the cause to be neurological

## 2018-10-08 NOTE — ASSESSMENT & PLAN NOTE
"Patient presented in April of this year with chronic daily headache following right carotid endarterectomy for 90% stenosis.  Sed rate was elevated at 68 at that time, PMR suspected.  He responded well to corticosteroid therapy and was referred to rheumatology, has been seeing Dr. Sims although I have not received any consultation notes.  Corticosteroid was weaned as condition improved.  However, in September he woke with sudden severe pain \"everywhere\" including muscle and joint, required hospitalization.  Sed rate was felt only very slightly elevated at this time, in the 20s.  Again symptoms resolved with increase of corticosteroid.   He reports to me today that Dr. Sims has discussed referral to [place] for consult.  He does have substantial history of Agent Orange exposure, there was some suspicion that this may be contributing to the various health problems he has had in the last year.  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  He did see an ENT who felt vertigo, he had gone to physical therapy with no improvement  No joint swelling or redness, unexplained fever, no current headache  "

## 2018-10-08 NOTE — ASSESSMENT & PLAN NOTE
Uncontrolled with last A1c of 8.7.  Patient attributes increase in glucose to chronic prednisone use at this time.  He continues with glipizide 10 mg every morning, 5 mg every afternoon, 15 units of Lantus nightly.  He notes improvement in fasting glucose since discontinuation of the prednisone he did note improvement in his glucose values when off the prednisone but has recently resumed corticosteroid use

## 2018-10-08 NOTE — PROGRESS NOTES
"Subjective:     Chief Complaint   Patient presents with   • Follow-Up     Francesco Schulte is a 68 y.o. male here today to follow up on:    Vertigo  Intermittent episodes of severe vertigo with n/v, sudden onset. Last episode in Aug.  Seen recently by Dr. Griffith who ruled out meniere's disease, felt the cause to be neurological    Polycythemia  Followed by Dr. Sims (hematology)  Stable on hydroxyurea    Agent orange exposure  Concerned this may be contributing to multitude of complaints including severe vertigo, sudden and severe episodes of     Type 2 diabetes mellitus with complication, without long-term current use of insulin (HCC)  Uncontrolled with last A1c of 8.7.  Patient attributes increase in glucose to chronic prednisone use at this time.  He continues with glipizide 10 mg every morning, 5 mg every afternoon, 15 units of Lantus nightly.  He notes improvement in fasting glucose since discontinuation of the prednisone he did note improvement in his glucose values when off the prednisone but has recently resumed corticosteroid use    Elevated sed rate- R/O PMR  Patient presented in April of this year with chronic daily headache following right carotid endarterectomy for 90% stenosis.  Sed rate was elevated at 68 at that time, PMR suspected.  He responded well to corticosteroid therapy and was referred to rheumatology, has been seeing Dr. Sims although I have not received any consultation notes.  Corticosteroid was weaned as condition improved.  However, in September he woke with sudden severe pain \"everywhere\" including muscle and joint, required hospitalization.  Sed rate was felt only very slightly elevated at this time, in the 20s.  Again symptoms resolved with increase of corticosteroid.   He reports to me today that Dr. Sims has discussed referral to [place] for consult.  He does have substantial history of Agent Orange exposure, there was some suspicion that this may be contributing to the " various health problems he has had in the last year.  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  He did see an ENT who felt vertigo, he had gone to physical therapy with no improvement  No joint swelling or redness, unexplained fever, no current headache       Current medicines (including changes today)  Current Outpatient Prescriptions   Medication Sig Dispense Refill   • apixaban (ELIQUIS) 5mg Tab Take 1 Tab by mouth 2 Times a Day. 180 Tab 3   • predniSONE (DELTASONE) 20 MG Tab TAKE 3 TABLET ONCE A DAY ORALLY 30 DAY(S)  1   • aspirin EC (ECOTRIN) 81 MG Tablet Delayed Response Take 1 Tab by mouth every day. 30 Tab    • therapeutic multivitamin-minerals (THERAGRAN-M) Tab Take 1 Tab by mouth every day.     • hydroxyurea (HYDREA) 500 MG Cap Take 1,500 mg by mouth every evening. Alternates taking 1000 mg one day and 1500 the next day     • insulin glargine (LANTUS) 100 UNIT/ML Solution Inject 10 Units as instructed every evening.     • tamsulosin (FLOMAX) 0.4 MG capsule Take 0.4 mg by mouth every evening.     • finasteride (PROSCAR) 5 MG Tab Take 5 mg by mouth every day.     • colesevelam (WELCHOL) 625 MG Tab Take 625 mg by mouth 2 times a day, with meals.     • glipiZIDE (GLUCOTROL) 10 MG Tab Take 10 mg by mouth every day.       No current facility-administered medications for this visit.      He  has a past medical history of Anesthesia; Cancer ([facility]); Diabetes ([facility]); Family history of punctured lung (2002); Heart attack ([facility]) (03/2017); Hemorrhagic disorder ([facility]); High cholesterol; Ischemic cardiomyopathy; Kidney stones; Orthostatic hypotension (3/29/2018); Pain; Polycythemia vera(238.4); Stroke ([facility]) (2016); Urinary bladder  "disorder; and Vertigo.    ROS included above     Objective:     Blood pressure 134/92, pulse 93, temperature 36.3 °C (97.3 °F), temperature source Temporal, resp. rate 16, height 1.905 m (6' 3\"), weight 98.4 kg (217 lb), SpO2 99 %. Body mass index is 27.12 kg/m².     Physical Exam:  General: Alert, oriented in no acute distress.  Eye contact is good, speech is normal, affect calm  Lungs: clear to auscultation bilaterally, normal effort, no wheeze/ rhonchi/ rales.  CV: regular rate and rhythm, S1, S2, no murmur  Abdomen: soft, nontender, no hepatosplenomegaly  Ext: no edema, color normal, vascularity normal, temperature normal    Assessment and Plan:   The following treatment plan was discussed   1. Elevated sed rate- R/O PMR   patient initially presented in April of this year with severe daily headache, sed rate was noted to be elevated at the time.  He has been referred to rheumatology, I have requested these notes.  There was apparently some discussion with his rheumatologist about possible referral to [place] given his substantial history of agent orange exposure and various health issues over the last year.  He believes there may be a clinical trial and he may participate in.  I will review office notes from the rheumatologist and start referral process as suggested   2. Multiple joint pain   sudden onset of severe muscle and joint pain leading to hospitalization approximately 3 weeks ago.  Sed rate was very minimally elevated at the time.  Symptoms resolved with corticosteroid therapy, he is working on weaning at this time   3. Vertigo   sudden onset of severe episodes.  History of old parietal occipital stroke possibly contributing.  No improvement with vestibular therapy and no further recommendation from ENT   4. Polycythemia   followed by hematology   5. Agent orange exposure   as discussed above   6. Type 2 diabetes mellitus with complication, without long-term current use of insulin (HCC)   uncontrolled. "  He has been on chronic corticosteroids for the last several months.  Patient is again resistant to medication adjustment, repeat labs next month       Followup: Pending labs and review of consultation notes         Please note that this dictation was created using voice recognition software. I have worked with consultants from the vendor as well as technical experts from [facility] to optimize the interface. I have made every reasonable attempt to correct obvious errors, but I expect that there are errors of grammar and possibly content that I did not discover before finalizing the note.

## 2018-10-14 ENCOUNTER — PATIENT MESSAGE (OUTPATIENT)
Dept: MEDICAL GROUP | Facility: MEDICAL CENTER | Age: 68
End: 2018-10-14

## 2018-10-15 RX ORDER — INSULIN GLARGINE 100 [IU]/ML
10 INJECTION, SOLUTION SUBCUTANEOUS
Qty: 10 ML | Refills: 2 | Status: SHIPPED | OUTPATIENT
Start: 2018-10-15 | End: 2019-01-03 | Stop reason: CLARIF

## 2018-10-16 ENCOUNTER — PATIENT MESSAGE (OUTPATIENT)
Dept: MEDICAL GROUP | Facility: MEDICAL CENTER | Age: 68
End: 2018-10-16

## 2018-10-16 NOTE — TELEPHONE ENCOUNTER
From: Francesco Schulte  To: ALVINO Harman  Sent: 10/16/2018 8:07 AM PDT  Subject: Prescription Question    Have appointment with Dr Levi barr on 10/25 to discuss  ----- Message -----  From: ALVINO Harman  Sent: 10/15/2018 11:38 AM PDT  To: Bob Schulte  Subject: RE: Prescription Question  Lantus refill has been sent. You are due for A1c again next month.  I have received notes from rheumatology and hematology but neither discuss [place] referral. I can refer you for consultation with hematology there if you would like as we do have an established diagnosis in this field. Let me know how you would like to proceed.  Cheyenne GOLDEN      ----- Message -----   From: Bob Schulte   Sent: 10/14/2018 11:21 AM PDT   To: ALVINO Harman  Subject: Prescription Question    Need prescription for Lantus 100 insulin at [facility] please.

## 2018-10-24 ENCOUNTER — TELEPHONE (OUTPATIENT)
Dept: PHYSICAL THERAPY | Facility: REHABILITATION | Age: 68
End: 2018-10-24

## 2018-10-24 NOTE — OP THERAPY DISCHARGE SUMMARY
Outpatient Physical Therapy  DISCHARGE SUMMARY NOTE      [facility] Physical Therapy [facility]  [address]  [address]  [place] [address]  Phone:  [phone]  Fax:  [phone]    Date of Visit: 10/24/2018    Patient: Francesco Schulte  YOB: 1950  MRN: [id number]     Referring Provider: Adin Hong M.D.  [address] [place] [address]   Referring Diagnosis Vertigo [R42]     Physical Therapy Occurrence Codes    Date of onset of impairment:  1/28/18   Date physical therapy care plan established or reviewed:  8/28/18   Date physical therapy treatment started:  8/28/18        Your patient is being discharged from Physical Therapy with the following comments:   · Patient has failed to schedule or reschedule follow-up visits    Comments:  Mr. Schulte was seen only for evaluation of his vertigo.  A trial of VRT was recommended, but pt was wanting to follow up with ENT prior to initiating treatment.  He has not been seen since 8/28/18     Limitations Remaining:  See IE    Recommendations:  D/C due to lapse in care.  Pt may return with new Rx if recommended.     Ernestina Messer, PT, DPT    Date: 10/24/2018

## 2018-10-26 ENCOUNTER — PATIENT MESSAGE (OUTPATIENT)
Dept: MEDICAL GROUP | Facility: MEDICAL CENTER | Age: 68
End: 2018-10-26

## 2018-10-26 NOTE — TELEPHONE ENCOUNTER
From: Francesco Schulte  To: ALVINO Harman  Sent: 10/26/2018 11:19 AM PDT  Subject: Prescription Question    Dr Suzan barr will make referral to [place] and cc you.   ----- Message -----  From: ALVINO Harman  Sent: 10/16/2018 8:46 AM PDT  To: Bob Schulte  Subject: RE: Prescription Question  Sounds great  Cheyenne GOLDEN      ----- Message -----   From: Bob Schulte   Sent: 10/16/2018 8:07 AM PDT   To: ALVINO Harman  Subject: Prescription Question    Have appointment with Dr Levi barr on 10/25 to discuss  ----- Message -----  From: ALVINO Harman  Sent: 10/15/2018 11:38 AM PDT  To: Bob Schulte  Subject: RE: Prescription Question  Lantus refill has been sent. You are due for A1c again next month.  I have received notes from rheumatology and hematology but neither discuss [place] referral. I can refer you for consultation with hematology there if you would like as we do have an established diagnosis in this field. Let me know how you would like to proceed.  Cheyenne GOLDEN      ----- Message -----   From: Bob Schulte   Sent: 10/14/2018 11:21 AM PDT   To: ALVINO Harman  Subject: Prescription Question    Need prescription for Lantus 100 insulin at [facility] please.

## 2018-11-29 ENCOUNTER — PATIENT MESSAGE (OUTPATIENT)
Dept: MEDICAL GROUP | Facility: MEDICAL CENTER | Age: 68
End: 2018-11-29

## 2018-11-29 DIAGNOSIS — I48.91 ATRIAL FIBRILLATION, UNSPECIFIED TYPE (HCC): ICD-10-CM

## 2018-11-29 NOTE — TELEPHONE ENCOUNTER
From: Francesco Schulte  To: ALVINO Harman  Sent: 11/29/2018 11:13 AM PST  Subject: Non-Urgent Medical Question    Need prescription for test strips to [facility]. One touch Verio.   Thank you

## 2018-12-28 ENCOUNTER — OFFICE VISIT (OUTPATIENT)
Dept: MEDICAL GROUP | Facility: MEDICAL CENTER | Age: 68
End: 2018-12-28
Payer: MEDICARE

## 2018-12-28 VITALS
SYSTOLIC BLOOD PRESSURE: 120 MMHG | RESPIRATION RATE: 16 BRPM | DIASTOLIC BLOOD PRESSURE: 60 MMHG | TEMPERATURE: 97.2 F | WEIGHT: 225 LBS | HEART RATE: 98 BPM | BODY MASS INDEX: 27.98 KG/M2 | OXYGEN SATURATION: 96 % | HEIGHT: 75 IN

## 2018-12-28 DIAGNOSIS — D75.1 POLYCYTHEMIA: ICD-10-CM

## 2018-12-28 DIAGNOSIS — E11.8 TYPE 2 DIABETES MELLITUS WITH COMPLICATION, WITHOUT LONG-TERM CURRENT USE OF INSULIN (HCC): ICD-10-CM

## 2018-12-28 DIAGNOSIS — R21 RASH AND NONSPECIFIC SKIN ERUPTION: ICD-10-CM

## 2018-12-28 PROCEDURE — 99214 OFFICE O/P EST MOD 30 MIN: CPT | Performed by: NURSE PRACTITIONER

## 2018-12-28 RX ORDER — CETIRIZINE HYDROCHLORIDE 10 MG/1
10 TABLET ORAL DAILY
Qty: 30 TAB | Refills: 1 | Status: SHIPPED | OUTPATIENT
Start: 2018-12-28 | End: 2019-02-08

## 2018-12-28 RX ORDER — METHYLPREDNISOLONE 4 MG/1
TABLET ORAL
Qty: 21 TAB | Refills: 0 | Status: ON HOLD | OUTPATIENT
Start: 2018-12-28 | End: 2019-01-06

## 2018-12-28 NOTE — ASSESSMENT & PLAN NOTE
Consistently taking glipizide. Has been self-adjusting his insulin after having hypoglycemic event down to reported 31. Reports home readings consistently in the 130s recently.  He is watching his diet.  Last A1c was above goal at 7.8, he is due for updated labs

## 2018-12-28 NOTE — PROGRESS NOTES
Subjective:     Chief Complaint   Patient presents with   • Rash     RASH ALL OVER BODY     Francesco Schulte is a 68 y.o. male here today to follow up on:    Type 2 diabetes mellitus with complication, without long-term current use of insulin ([facility])  Consistently taking glipizide. Has been self-adjusting his insulin after having hypoglycemic event down to reported 31. Reports home readings consistently in the 130s recently.  He is watching his diet.  Last A1c was above goal at 7.8, he is due for updated labs    Polycythemia  Followed by oncology, reports gradual decline in blood counts with recent platelet value in the 130s.  Hydroxyurea dose was decreased at that time.  However, he continues to struggle with rash which has been significantly worse in the last several weeks.  The buttock is been most problematic for him, painful.  Has started taking benedryl d/t itching and burning.  He is using topical Kenalog also without much improvement       Current medicines (including changes today)  Current Outpatient Prescriptions   Medication Sig Dispense Refill   • cetirizine (ZYRTEC) 10 MG Tab Take 1 Tab by mouth every day. 30 Tab 1   • MethylPREDNISolone (MEDROL DOSEPAK) 4 MG Tablet Therapy Pack As directed on the packaging label. 21 Tab 0   • Blood Glucose Test Strips onetouch verio test strips for glucose monitoring  Each 11   • predniSONE (DELTASONE) 20 MG Tab TAKE 3 TABLET ONCE A DAY ORALLY 30 DAY(S)  1   • apixaban (ELIQUIS) 5mg Tab Take 1 Tab by mouth 2 Times a Day. 180 Tab 3   • aspirin EC (ECOTRIN) 81 MG Tablet Delayed Response Take 1 Tab by mouth every day. 30 Tab    • therapeutic multivitamin-minerals (THERAGRAN-M) Tab Take 1 Tab by mouth every day.     • hydroxyurea (HYDREA) 500 MG Cap Take 1,500 mg by mouth every evening. Alternates taking 1000 mg one day and 1500 the next day     • tamsulosin (FLOMAX) 0.4 MG capsule Take 0.4 mg by mouth every evening.     • finasteride (PROSCAR) 5 MG Tab Take 5  "mg by mouth every day.     • colesevelam (WELCHOL) 625 MG Tab Take 625 mg by mouth 2 times a day, with meals.     • glipiZIDE (GLUCOTROL) 10 MG Tab Take 10 mg by mouth every day.     • insulin glargine (LANTUS) 100 UNIT/ML Solution Inject 10 Units as instructed every bedtime. (Patient not taking: Reported on 12/28/2018) 10 mL 2     No current facility-administered medications for this visit.      He  has a past medical history of Anesthesia; Cancer ([facility]); Diabetes ([facility]); Family history of punctured lung (2002); Heart attack ([facility]) (03/2017); Hemorrhagic disorder ([facility]); High cholesterol; Ischemic cardiomyopathy; Kidney stones; Orthostatic hypotension (3/29/2018); Pain; Polycythemia vera(238.4); Stroke ([facility]) (2016); Urinary bladder disorder; and Vertigo.    ROS included above     Objective:     Blood pressure 120/60, pulse 98, temperature 36.2 °C (97.2 °F), temperature source Temporal, resp. rate 16, height 1.905 m (6' 3\"), weight 102.1 kg (225 lb), SpO2 96 %. Body mass index is 28.12 kg/m².     Physical Exam:  General: Alert, oriented in no acute distress.  Eye contact is good, speech is normal, affect calm  Lungs: clear to auscultation bilaterally, normal effort, no wheeze/ rhonchi/ rales.  CV: regular rate and rhythm, S1, S2, no murmur  Abdomen: soft, nontender  Ext: Scattered erythematous macules, some with crusting on bilateral arms, chest, back.  Significant gluteal involvement.  No open lesions or drainage.  No edema, color normal, vascularity normal, temperature normal    Assessment and Plan:   The following treatment plan was discussed   1. Type 2 diabetes mellitus with complication, without long-term current use of insulin ([facility])   recent decline in home glucose, he has reduced his basal insulin dose.  Update labs  HEMOGLOBIN A1C    COMP METABOLIC PANEL    Lipid Profile   2. Polycythemia   blood counts have been well controlled, followed by oncology   3. Rash and nonspecific skin eruption   significant " dermatitis, likely related to hydroxyurea use.  Will do short course of methylprednisolone, start daily Zyrtec, may continue with topical Kenalog.  Skin care reviewed.  Patient to continue follow-up with oncology regarding ongoing use of medication  cetirizine (ZYRTEC) 10 MG Tab    MethylPREDNISolone (MEDROL DOSEPAK) 4 MG Tablet Therapy Pack       Followup: Pending labs         Please note that this dictation was created using voice recognition software. I have worked with consultants from the vendor as well as technical experts from [employer] to optimize the interface. I have made every reasonable attempt to correct obvious errors, but I expect that there are errors of grammar and possibly content that I did not discover before finalizing the note.

## 2018-12-28 NOTE — ASSESSMENT & PLAN NOTE
Followed by oncology, reports gradual decline in blood counts with recent platelet value in the 130s.  Hydroxyurea dose was decreased at that time.  However, he continues to struggle with rash which has been significantly worse in the last several weeks.  The buttock is been most problematic for him, painful.  Has started taking benedryl d/t itching and burning.  He is using topical Kenalog also without much improvement

## 2019-01-02 ENCOUNTER — PATIENT MESSAGE (OUTPATIENT)
Dept: MEDICAL GROUP | Facility: MEDICAL CENTER | Age: 69
End: 2019-01-02

## 2019-01-03 ENCOUNTER — HOSPITAL ENCOUNTER (INPATIENT)
Facility: MEDICAL CENTER | Age: 69
LOS: 2 days | DRG: 300 | End: 2019-01-06
Attending: EMERGENCY MEDICINE | Admitting: HOSPITALIST
Payer: MEDICARE

## 2019-01-03 ENCOUNTER — APPOINTMENT (OUTPATIENT)
Dept: RADIOLOGY | Facility: MEDICAL CENTER | Age: 69
DRG: 300 | End: 2019-01-03
Attending: EMERGENCY MEDICINE
Payer: MEDICARE

## 2019-01-03 PROBLEM — I73.81 ERYTHROMELALGIA (HCC): Status: ACTIVE | Noted: 2019-01-03

## 2019-01-03 PROBLEM — D45 POLYCYTHEMIA VERA (HCC): Status: ACTIVE | Noted: 2019-01-03

## 2019-01-03 LAB
ALBUMIN SERPL BCP-MCNC: 3.9 G/DL (ref 3.2–4.9)
ALBUMIN/GLOB SERPL: 1.1 G/DL
ALP SERPL-CCNC: 54 U/L (ref 30–99)
ALT SERPL-CCNC: 26 U/L (ref 2–50)
ANION GAP SERPL CALC-SCNC: 11 MMOL/L (ref 0–11.9)
ANISOCYTOSIS BLD QL SMEAR: ABNORMAL
APPEARANCE UR: CLEAR
APTT PPP: 30 SEC (ref 24.7–36)
AST SERPL-CCNC: 20 U/L (ref 12–45)
BACTERIA #/AREA URNS HPF: ABNORMAL /HPF
BASOPHILS # BLD AUTO: 1.3 % (ref 0–1.8)
BASOPHILS # BLD: 0.05 K/UL (ref 0–0.12)
BILIRUB SERPL-MCNC: 1.4 MG/DL (ref 0.1–1.5)
BILIRUB UR QL STRIP.AUTO: NEGATIVE
BNP SERPL-MCNC: 371 PG/ML (ref 0–100)
BUN SERPL-MCNC: 18 MG/DL (ref 8–22)
CALCIUM SERPL-MCNC: 9.1 MG/DL (ref 8.4–10.2)
CHLORIDE SERPL-SCNC: 104 MMOL/L (ref 96–112)
CHOLEST SERPL-MCNC: 158 MG/DL (ref 100–199)
CO2 SERPL-SCNC: 19 MMOL/L (ref 20–33)
COLOR UR: YELLOW
COMMENT 1642: NORMAL
CREAT SERPL-MCNC: 1.08 MG/DL (ref 0.5–1.4)
CRP SERPL HS-MCNC: 0.15 MG/DL (ref 0–0.75)
EKG IMPRESSION: NORMAL
EOSINOPHIL # BLD AUTO: 0.04 K/UL (ref 0–0.51)
EOSINOPHIL NFR BLD: 1 % (ref 0–6.9)
EPI CELLS #/AREA URNS HPF: NEGATIVE /HPF
ERYTHROCYTE [DISTWIDTH] IN BLOOD BY AUTOMATED COUNT: 73.8 FL (ref 35.9–50)
ERYTHROCYTE [SEDIMENTATION RATE] IN BLOOD BY WESTERGREN METHOD: 6 MM/HOUR (ref 0–20)
EST. AVERAGE GLUCOSE BLD GHB EST-MCNC: 143 MG/DL
FLUAV RNA SPEC QL NAA+PROBE: NEGATIVE
FLUBV RNA SPEC QL NAA+PROBE: NEGATIVE
GLOBULIN SER CALC-MCNC: 3.7 G/DL (ref 1.9–3.5)
GLUCOSE BLD-MCNC: 402 MG/DL (ref 65–99)
GLUCOSE BLD-MCNC: 448 MG/DL (ref 65–99)
GLUCOSE SERPL-MCNC: 198 MG/DL (ref 65–99)
GLUCOSE UR STRIP.AUTO-MCNC: >=1000 MG/DL
HBA1C MFR BLD: 6.6 % (ref 0–5.6)
HCT VFR BLD AUTO: 43.9 % (ref 42–52)
HDLC SERPL-MCNC: 32 MG/DL
HGB BLD-MCNC: 14.7 G/DL (ref 14–18)
IMM GRANULOCYTES # BLD AUTO: 0.03 K/UL (ref 0–0.11)
IMM GRANULOCYTES NFR BLD AUTO: 0.8 % (ref 0–0.9)
INR PPP: 1.44 (ref 0.87–1.13)
KETONES UR STRIP.AUTO-MCNC: ABNORMAL MG/DL
LACTATE BLD-SCNC: 2 MMOL/L (ref 0.5–2)
LACTATE BLD-SCNC: 2.2 MMOL/L (ref 0.5–2)
LACTATE BLD-SCNC: 3 MMOL/L (ref 0.5–2)
LACTATE BLD-SCNC: 4 MMOL/L (ref 0.5–2)
LDLC SERPL CALC-MCNC: 102 MG/DL
LEUKOCYTE ESTERASE UR QL STRIP.AUTO: NEGATIVE
LYMPHOCYTES # BLD AUTO: 0.44 K/UL (ref 1–4.8)
LYMPHOCYTES NFR BLD: 11.3 % (ref 22–41)
MACROCYTES BLD QL SMEAR: ABNORMAL
MAGNESIUM SERPL-MCNC: 1.9 MG/DL (ref 1.5–2.5)
MCH RBC QN AUTO: 35.8 PG (ref 27–33)
MCHC RBC AUTO-ENTMCNC: 33.5 G/DL (ref 33.7–35.3)
MCV RBC AUTO: 106.8 FL (ref 81.4–97.8)
MICRO URNS: ABNORMAL
MONOCYTES # BLD AUTO: 0.46 K/UL (ref 0–0.85)
MONOCYTES NFR BLD AUTO: 11.9 % (ref 0–13.4)
MUCOUS THREADS #/AREA URNS HPF: ABNORMAL /HPF
NEUTROPHILS # BLD AUTO: 2.86 K/UL (ref 1.82–7.42)
NEUTROPHILS NFR BLD: 73.7 % (ref 44–72)
NITRITE UR QL STRIP.AUTO: NEGATIVE
NRBC # BLD AUTO: 0 K/UL
NRBC BLD-RTO: 0 /100 WBC
PH UR STRIP.AUTO: 6 [PH]
PLATELET # BLD AUTO: 138 K/UL (ref 164–446)
PMV BLD AUTO: 11.9 FL (ref 9–12.9)
POTASSIUM SERPL-SCNC: 3.6 MMOL/L (ref 3.6–5.5)
PROT SERPL-MCNC: 7.6 G/DL (ref 6–8.2)
PROT UR QL STRIP: NEGATIVE MG/DL
PROTHROMBIN TIME: 17.4 SEC (ref 12–14.6)
RBC # BLD AUTO: 4.11 M/UL (ref 4.7–6.1)
RBC # URNS HPF: ABNORMAL /HPF
RBC BLD AUTO: PRESENT
RBC UR QL AUTO: ABNORMAL
SODIUM SERPL-SCNC: 134 MMOL/L (ref 135–145)
SP GR UR STRIP.AUTO: 1.01
TRIGL SERPL-MCNC: 122 MG/DL (ref 0–149)
TROPONIN I SERPL-MCNC: <0.02 NG/ML (ref 0–0.04)
WBC # BLD AUTO: 3.9 K/UL (ref 4.8–10.8)
WBC #/AREA URNS HPF: ABNORMAL /HPF

## 2019-01-03 PROCEDURE — 87502 INFLUENZA DNA AMP PROBE: CPT

## 2019-01-03 PROCEDURE — 700111 HCHG RX REV CODE 636 W/ 250 OVERRIDE (IP): Performed by: EMERGENCY MEDICINE

## 2019-01-03 PROCEDURE — 83880 ASSAY OF NATRIURETIC PEPTIDE: CPT

## 2019-01-03 PROCEDURE — A9270 NON-COVERED ITEM OR SERVICE: HCPCS | Performed by: HOSPITALIST

## 2019-01-03 PROCEDURE — 82962 GLUCOSE BLOOD TEST: CPT

## 2019-01-03 PROCEDURE — 700105 HCHG RX REV CODE 258: Performed by: EMERGENCY MEDICINE

## 2019-01-03 PROCEDURE — 36415 COLL VENOUS BLD VENIPUNCTURE: CPT

## 2019-01-03 PROCEDURE — G0378 HOSPITAL OBSERVATION PER HR: HCPCS

## 2019-01-03 PROCEDURE — 81001 URINALYSIS AUTO W/SCOPE: CPT

## 2019-01-03 PROCEDURE — 96376 TX/PRO/DX INJ SAME DRUG ADON: CPT

## 2019-01-03 PROCEDURE — 83735 ASSAY OF MAGNESIUM: CPT

## 2019-01-03 PROCEDURE — 96372 THER/PROPH/DIAG INJ SC/IM: CPT

## 2019-01-03 PROCEDURE — 80061 LIPID PANEL: CPT

## 2019-01-03 PROCEDURE — 85730 THROMBOPLASTIN TIME PARTIAL: CPT

## 2019-01-03 PROCEDURE — 83605 ASSAY OF LACTIC ACID: CPT

## 2019-01-03 PROCEDURE — 96365 THER/PROPH/DIAG IV INF INIT: CPT

## 2019-01-03 PROCEDURE — 700105 HCHG RX REV CODE 258: Performed by: HOSPITALIST

## 2019-01-03 PROCEDURE — 80053 COMPREHEN METABOLIC PANEL: CPT

## 2019-01-03 PROCEDURE — 87040 BLOOD CULTURE FOR BACTERIA: CPT

## 2019-01-03 PROCEDURE — 71045 X-RAY EXAM CHEST 1 VIEW: CPT

## 2019-01-03 PROCEDURE — 96375 TX/PRO/DX INJ NEW DRUG ADDON: CPT

## 2019-01-03 PROCEDURE — 85025 COMPLETE CBC W/AUTO DIFF WBC: CPT

## 2019-01-03 PROCEDURE — 84484 ASSAY OF TROPONIN QUANT: CPT

## 2019-01-03 PROCEDURE — 86140 C-REACTIVE PROTEIN: CPT

## 2019-01-03 PROCEDURE — 99285 EMERGENCY DEPT VISIT HI MDM: CPT

## 2019-01-03 PROCEDURE — 85652 RBC SED RATE AUTOMATED: CPT

## 2019-01-03 PROCEDURE — 93005 ELECTROCARDIOGRAM TRACING: CPT

## 2019-01-03 PROCEDURE — 93005 ELECTROCARDIOGRAM TRACING: CPT | Performed by: EMERGENCY MEDICINE

## 2019-01-03 PROCEDURE — 700102 HCHG RX REV CODE 250 W/ 637 OVERRIDE(OP): Performed by: HOSPITALIST

## 2019-01-03 PROCEDURE — 83036 HEMOGLOBIN GLYCOSYLATED A1C: CPT

## 2019-01-03 PROCEDURE — 94760 N-INVAS EAR/PLS OXIMETRY 1: CPT

## 2019-01-03 PROCEDURE — 85610 PROTHROMBIN TIME: CPT

## 2019-01-03 PROCEDURE — 700111 HCHG RX REV CODE 636 W/ 250 OVERRIDE (IP): Performed by: HOSPITALIST

## 2019-01-03 PROCEDURE — 99219 PR INITIAL OBSERVATION CARE,LEVL II: CPT | Performed by: HOSPITALIST

## 2019-01-03 RX ORDER — DEXAMETHASONE SODIUM PHOSPHATE 4 MG/ML
10 INJECTION, SOLUTION INTRA-ARTICULAR; INTRALESIONAL; INTRAMUSCULAR; INTRAVENOUS; SOFT TISSUE ONCE
Status: COMPLETED | OUTPATIENT
Start: 2019-01-03 | End: 2019-01-03

## 2019-01-03 RX ORDER — SODIUM CHLORIDE 9 MG/ML
3060 INJECTION, SOLUTION INTRAVENOUS ONCE
Status: COMPLETED | OUTPATIENT
Start: 2019-01-03 | End: 2019-01-03

## 2019-01-03 RX ORDER — TAMSULOSIN HYDROCHLORIDE 0.4 MG/1
0.4 CAPSULE ORAL EVERY EVENING
Status: DISCONTINUED | OUTPATIENT
Start: 2019-01-03 | End: 2019-01-06 | Stop reason: HOSPADM

## 2019-01-03 RX ORDER — AMOXICILLIN 250 MG
2 CAPSULE ORAL 2 TIMES DAILY
Status: DISCONTINUED | OUTPATIENT
Start: 2019-01-03 | End: 2019-01-06 | Stop reason: HOSPADM

## 2019-01-03 RX ORDER — M-VIT,TX,IRON,MINS/CALC/FOLIC 27MG-0.4MG
1 TABLET ORAL DAILY
Status: DISCONTINUED | OUTPATIENT
Start: 2019-01-04 | End: 2019-01-06 | Stop reason: HOSPADM

## 2019-01-03 RX ORDER — BISACODYL 10 MG
10 SUPPOSITORY, RECTAL RECTAL
Status: DISCONTINUED | OUTPATIENT
Start: 2019-01-03 | End: 2019-01-06 | Stop reason: HOSPADM

## 2019-01-03 RX ORDER — COLESEVELAM 180 1/1
625 TABLET ORAL 2 TIMES DAILY WITH MEALS
Status: DISCONTINUED | OUTPATIENT
Start: 2019-01-03 | End: 2019-01-06 | Stop reason: HOSPADM

## 2019-01-03 RX ORDER — GLIPIZIDE 5 MG/1
15 TABLET ORAL DAILY
Status: DISCONTINUED | OUTPATIENT
Start: 2019-01-04 | End: 2019-01-03

## 2019-01-03 RX ORDER — SODIUM CHLORIDE 9 MG/ML
1000 INJECTION, SOLUTION INTRAVENOUS ONCE
Status: COMPLETED | OUTPATIENT
Start: 2019-01-03 | End: 2019-01-03

## 2019-01-03 RX ORDER — OXYCODONE HYDROCHLORIDE AND ACETAMINOPHEN 5; 325 MG/1; MG/1
1-2 TABLET ORAL EVERY 8 HOURS PRN
Status: DISCONTINUED | OUTPATIENT
Start: 2019-01-03 | End: 2019-01-06

## 2019-01-03 RX ORDER — METHYLPREDNISOLONE SODIUM SUCCINATE 125 MG/2ML
125 INJECTION, POWDER, LYOPHILIZED, FOR SOLUTION INTRAMUSCULAR; INTRAVENOUS EVERY 6 HOURS
Status: DISCONTINUED | OUTPATIENT
Start: 2019-01-03 | End: 2019-01-05

## 2019-01-03 RX ORDER — SODIUM CHLORIDE 9 MG/ML
INJECTION, SOLUTION INTRAVENOUS CONTINUOUS
Status: DISCONTINUED | OUTPATIENT
Start: 2019-01-03 | End: 2019-01-04

## 2019-01-03 RX ORDER — HYDROXYUREA 500 MG/1
1000 CAPSULE ORAL EVERY EVENING
Status: DISCONTINUED | OUTPATIENT
Start: 2019-01-03 | End: 2019-01-06 | Stop reason: HOSPADM

## 2019-01-03 RX ORDER — CETIRIZINE HYDROCHLORIDE 10 MG/1
10 TABLET ORAL DAILY
Status: DISCONTINUED | OUTPATIENT
Start: 2019-01-03 | End: 2019-01-04

## 2019-01-03 RX ORDER — FINASTERIDE 5 MG/1
5 TABLET, FILM COATED ORAL DAILY
Status: DISCONTINUED | OUTPATIENT
Start: 2019-01-04 | End: 2019-01-06 | Stop reason: HOSPADM

## 2019-01-03 RX ORDER — DEXTROSE MONOHYDRATE 25 G/50ML
25 INJECTION, SOLUTION INTRAVENOUS
Status: DISCONTINUED | OUTPATIENT
Start: 2019-01-03 | End: 2019-01-04

## 2019-01-03 RX ORDER — POLYETHYLENE GLYCOL 3350 17 G/17G
1 POWDER, FOR SOLUTION ORAL
Status: DISCONTINUED | OUTPATIENT
Start: 2019-01-03 | End: 2019-01-06 | Stop reason: HOSPADM

## 2019-01-03 RX ORDER — NAPROXEN 250 MG/1
250 TABLET ORAL 2 TIMES DAILY PRN
Status: DISCONTINUED | OUTPATIENT
Start: 2019-01-03 | End: 2019-01-06 | Stop reason: HOSPADM

## 2019-01-03 RX ORDER — GABAPENTIN 100 MG/1
100 CAPSULE ORAL 3 TIMES DAILY
Status: DISCONTINUED | OUTPATIENT
Start: 2019-01-03 | End: 2019-01-04

## 2019-01-03 RX ORDER — OXYCODONE HYDROCHLORIDE AND ACETAMINOPHEN 5; 325 MG/1; MG/1
1-2 TABLET ORAL EVERY 8 HOURS PRN
COMMUNITY
End: 2019-02-08 | Stop reason: CLARIF

## 2019-01-03 RX ORDER — INSULIN GLARGINE 100 [IU]/ML
10 INJECTION, SOLUTION SUBCUTANEOUS EVERY EVENING
Status: DISCONTINUED | OUTPATIENT
Start: 2019-01-03 | End: 2019-01-04

## 2019-01-03 RX ADMIN — INSULIN HUMAN 6 UNITS: 100 INJECTION, SOLUTION PARENTERAL at 21:38

## 2019-01-03 RX ADMIN — INSULIN HUMAN 6 UNITS: 100 INJECTION, SOLUTION PARENTERAL at 17:56

## 2019-01-03 RX ADMIN — OXYCODONE HYDROCHLORIDE AND ACETAMINOPHEN 2 TABLET: 5; 325 TABLET ORAL at 17:49

## 2019-01-03 RX ADMIN — APIXABAN 5 MG: 5 TABLET, FILM COATED ORAL at 17:49

## 2019-01-03 RX ADMIN — METHYLPREDNISOLONE SODIUM SUCCINATE 125 MG: 125 INJECTION, POWDER, FOR SOLUTION INTRAMUSCULAR; INTRAVENOUS at 17:48

## 2019-01-03 RX ADMIN — GABAPENTIN 100 MG: 100 CAPSULE ORAL at 17:48

## 2019-01-03 RX ADMIN — SODIUM CHLORIDE 1000 ML: 9 INJECTION, SOLUTION INTRAVENOUS at 11:06

## 2019-01-03 RX ADMIN — AMPICILLIN AND SULBACTAM 3 G: 2; 1 INJECTION, POWDER, FOR SOLUTION INTRAVENOUS at 11:07

## 2019-01-03 RX ADMIN — Medication: at 17:51

## 2019-01-03 RX ADMIN — METHYLPREDNISOLONE SODIUM SUCCINATE 125 MG: 125 INJECTION, POWDER, FOR SOLUTION INTRAMUSCULAR; INTRAVENOUS at 13:24

## 2019-01-03 RX ADMIN — CETIRIZINE HYDROCHLORIDE 10 MG: 10 TABLET, FILM COATED ORAL at 13:24

## 2019-01-03 RX ADMIN — HYDROXYUREA 1000 MG: 500 CAPSULE ORAL at 18:00

## 2019-01-03 RX ADMIN — SODIUM CHLORIDE 3060 ML: 9 INJECTION, SOLUTION INTRAVENOUS at 23:50

## 2019-01-03 RX ADMIN — METHYLPREDNISOLONE SODIUM SUCCINATE 125 MG: 125 INJECTION, POWDER, FOR SOLUTION INTRAMUSCULAR; INTRAVENOUS at 23:50

## 2019-01-03 RX ADMIN — TAMSULOSIN HYDROCHLORIDE 0.4 MG: 0.4 CAPSULE ORAL at 17:48

## 2019-01-03 RX ADMIN — DEXAMETHASONE SODIUM PHOSPHATE 10 MG: 4 INJECTION, SOLUTION INTRA-ARTICULAR; INTRALESIONAL; INTRAMUSCULAR; INTRAVENOUS; SOFT TISSUE at 09:53

## 2019-01-03 RX ADMIN — GABAPENTIN 100 MG: 100 CAPSULE ORAL at 13:24

## 2019-01-03 RX ADMIN — INSULIN GLARGINE 10 UNITS: 100 INJECTION, SOLUTION SUBCUTANEOUS at 23:20

## 2019-01-03 RX ADMIN — STANDARDIZED SENNA CONCENTRATE AND DOCUSATE SODIUM 2 TABLET: 8.6; 5 TABLET, FILM COATED ORAL at 17:48

## 2019-01-03 RX ADMIN — SODIUM CHLORIDE: 9 INJECTION, SOLUTION INTRAVENOUS at 13:24

## 2019-01-03 RX ADMIN — COLESEVELAM HYDROCHLORIDE 625 MG: 625 TABLET, FILM COATED ORAL at 17:54

## 2019-01-03 ASSESSMENT — COGNITIVE AND FUNCTIONAL STATUS - GENERAL
SUGGESTED CMS G CODE MODIFIER DAILY ACTIVITY: CH
DAILY ACTIVITIY SCORE: 24
MOBILITY SCORE: 24
SUGGESTED CMS G CODE MODIFIER MOBILITY: CH

## 2019-01-03 ASSESSMENT — ENCOUNTER SYMPTOMS
BLOOD IN STOOL: 0
MYALGIAS: 1
ORTHOPNEA: 0
NAUSEA: 0
DIZZINESS: 0
FEVER: 0
MEMORY LOSS: 0
HEADACHES: 0
DEPRESSION: 0
PALPITATIONS: 0
SORE THROAT: 0
WEAKNESS: 1
NERVOUS/ANXIOUS: 0
CHILLS: 0
HEARTBURN: 0
COUGH: 0
DOUBLE VISION: 0
CLAUDICATION: 0
SPUTUM PRODUCTION: 0
STRIDOR: 0
SPEECH CHANGE: 0
TREMORS: 0
PHOTOPHOBIA: 0
PND: 0
VOMITING: 0
EYE PAIN: 0
TINGLING: 0
BACK PAIN: 0
SHORTNESS OF BREATH: 0
NECK PAIN: 0
CONSTIPATION: 0
HEMOPTYSIS: 0
SENSORY CHANGE: 0
BLURRED VISION: 0

## 2019-01-03 ASSESSMENT — PAIN SCALES - GENERAL
PAINLEVEL_OUTOF10: 7
PAINLEVEL_OUTOF10: 4
PAINLEVEL_OUTOF10: 4
PAINLEVEL_OUTOF10: 3
PAINLEVEL_OUTOF10: 4
PAINLEVEL_OUTOF10: 4

## 2019-01-03 ASSESSMENT — CHA2DS2 SCORE
PRIOR STROKE OR TIA OR THROMBOEMBOLISM: YES
SEX: MALE
AGE 75 OR GREATER: NO
CHA2DS2 VASC SCORE: 5
AGE 65 TO 74: YES
DIABETES: YES
VASCULAR DISEASE: YES
HYPERTENSION: NO
CHF OR LEFT VENTRICULAR DYSFUNCTION: NO

## 2019-01-03 ASSESSMENT — PATIENT HEALTH QUESTIONNAIRE - PHQ9
1. LITTLE INTEREST OR PLEASURE IN DOING THINGS: NOT AT ALL
2. FEELING DOWN, DEPRESSED, IRRITABLE, OR HOPELESS: NOT AT ALL
SUM OF ALL RESPONSES TO PHQ9 QUESTIONS 1 AND 2: 0

## 2019-01-03 NOTE — ASSESSMENT & PLAN NOTE
Today complains of severe fatigue and exhaustion dyspnea on exertion and precordial chest pain.  Echo, serial troponins, d-dimer, stress test have been ordered.  -EKG tracing reviewed shows left bundle branch block.  Patient says he has had stress test within the last year but I reviewed the records and could not find this, Dr. Taras Ramirez also reviewed and could not find evidence of this.

## 2019-01-03 NOTE — ED PROVIDER NOTES
ED Provider Note  CHIEF COMPLAINT  Chief Complaint   Patient presents with   • Chest Pain   • Shortness of Breath       HPI  Francesco Schulte is a 68 y.o. male who presents to the emergency department presents with chest pain, shortness of breath, rash, fatigue, congestion.  He has a history of polycythemia vera and was placed on hydroxyurea approximately 1 month ago and is starting a rash after that.  Shortly thereafter he was given steroids without relief of his rash.  He went to his urgent care physician and found that the rash was spreading therefore he was placed on hydrocortisone cream and more steroids.  He states now is taking over his old body especially in his buttocks.  He also complains of shortness of breath and slight chest discomfort that is intermittent.  He does have a history in the past of atrial fibrillation, myocardial infarction or heart failure.  He also complains of intermittent fevers, nausea but no vomiting, denies loss of sensation or strength  REVIEW OF SYSTEMS  Positives as above. Pertinent negatives include loss of sensation or strength in arms or legs, all other review of systems are negative    PAST MEDICAL HISTORY  Past Medical History:   Diagnosis Date   • Anesthesia     resistant to pain meds   • Cancer (HCC)     polycythemia vera   • Diabetes (HCC)     insulin and oral meds   • Family history of punctured lung 2002    left lung   • Heart attack (HCC) 03/2017   • Hemorrhagic disorder (HCC)     on blood thinners   • High cholesterol    • Ischemic cardiomyopathy    • Kidney stones     hx of kidney stones   • Orthostatic hypotension 3/29/2018   • Pain     headache pain   • Polycythemia vera(238.4)    • Stroke (HCC) 2016    r/t afib; eye issues resolved afterward   • Urinary bladder disorder     enlarged prostate, weak stream, difficulty urinating   • Vertigo        FAMILY HISTORY  Noncontributory    SOCIAL HISTORY  Social History     Social History   • Marital status:   "    Spouse name: N/A   • Number of children: N/A   • Years of education: N/A     Social History Main Topics   • Smoking status: Never Smoker   • Smokeless tobacco: Never Used   • Alcohol use No   • Drug use: No   • Sexual activity: Not on file     Other Topics Concern   • Not on file     Social History Narrative   • No narrative on file       SURGICAL HISTORY  Past Surgical History:   Procedure Laterality Date   • TEMPORAL ARTERY BIOPSY Right 6/26/2018    Procedure: TEMPORAL ARTERY BIOPSY;  Surgeon: Rajendra Aguilar M.D.;  Location: SURGERY SAME DAY [facility] ORS;  Service: General   • CAROTID ENDARTERECTOMY Right 3/21/2018    Procedure: CAROTID ENDARTERECTOMY- W/EEG MONITORING;  Surgeon: Benny Morfin M.D.;  Location: SURGERY [facility] ORS;  Service: General   • APPENDECTOMY     • CATH PLACEMENT      right arm   • LAMINOTOMY      diskectomy; C4   • OTHER CARDIAC SURGERY      stents x 3       CURRENT MEDICATIONS  Home Medications     Reviewed by Trisha Robertson R.N. (Registered Nurse) on 01/03/19 at 1339  Med List Status: Complete   Medication Last Dose Status   apixaban (ELIQUIS) 5mg Tab 1/3/2019 Active   aspirin EC (ECOTRIN) 81 MG Tablet Delayed Response 1/3/2019 Active   cetirizine (ZYRTEC) 10 MG Tab 1/2/2019 Active   colesevelam (WELCHOL) 625 MG Tab 1/2/2019 Active   finasteride (PROSCAR) 5 MG Tab 1/3/2019 Active   glipiZIDE (GLUCOTROL) 10 MG Tab 1/3/2019 Active   hydroxyurea (HYDREA) 500 MG Cap 1/2/2019 Active   MethylPREDNISolone (MEDROL DOSEPAK) 4 MG Tablet Therapy Pack 1/2/2019 Active   oxyCODONE-acetaminophen (PERCOCET) 5-325 MG Tab 1/2/2019 Active   tamsulosin (FLOMAX) 0.4 MG capsule 1/2/2019 Active   therapeutic multivitamin-minerals (THERAGRAN-M) Tab 1/3/2019 Active                ALLERGIES  Allergies   Allergen Reactions   • Beta Adrenergic Blockers Unspecified     dizziness   • Metformin Unspecified     \"Similar to a heart attack, I was hospitalized\"   • Simvastatin      Myalgias   • Statins " "[Hmg-Coa-R Inhibitors]      Muscle ache, joint pain       PHYSICAL EXAM  VITAL SIGNS: /89   Pulse 75   Temp 36.7 °C (98 °F) (Oral)   Resp 18   Ht 1.905 m (6' 3\")   Wt 102 kg (224 lb 13.9 oz)   SpO2 99%   BMI 28.11 kg/m²    Constitutional: Well developed, Well nourished, No acute distress, Non-toxic appearance.   Eyes: PERRLA, EOMI, Conjunctiva normal, No discharge.   Cardiovascular: Normal heart rate, Normal rhythm, No murmurs, No rubs, No gallops, and intact distal pulses.   Thorax & Lungs:  No respiratory distress, no rales, no rhonchi, No wheezing, No chest wall tenderness.   Abdomen: Bowel sounds normal, Soft, No tenderness, No guarding, No rebound, No pulsatile masses.   Skin: Diffuse petechial rash upper lower extremities, abdomen, exquisitely thick on the buttock region with slight surrounding erythema that is not fluctuant but tender to touch, erythematous rash bilateral cheeks blanchable   extremities: Full range of motion, no deformity, no edema.  Neurologic: Alert & oriented x 3, No focal deficits noted, acting appropriately on exam.  Psychiatric: Affect normal for clinical presentation.      RADIOLOGY/PROCEDURES  DX-CHEST-PORTABLE (1 VIEW)   Final Result      Borderline cardiomegaly.        Results for orders placed or performed during the hospital encounter of 01/03/19   CBC w/ Differential   Result Value Ref Range    WBC 3.9 (L) 4.8 - 10.8 K/uL    RBC 4.11 (L) 4.70 - 6.10 M/uL    Hemoglobin 14.7 14.0 - 18.0 g/dL    Hematocrit 43.9 42.0 - 52.0 %    .8 (H) 81.4 - 97.8 fL    MCH 35.8 (H) 27.0 - 33.0 pg    MCHC 33.5 (L) 33.7 - 35.3 g/dL    RDW 73.8 (H) 35.9 - 50.0 fL    Platelet Count 138 (L) 164 - 446 K/uL    MPV 11.9 9.0 - 12.9 fL    Neutrophils-Polys 73.70 (H) 44.00 - 72.00 %    Lymphocytes 11.30 (L) 22.00 - 41.00 %    Monocytes 11.90 0.00 - 13.40 %    Eosinophils 1.00 0.00 - 6.90 %    Basophils 1.30 0.00 - 1.80 %    Immature Granulocytes 0.80 0.00 - 0.90 %    Nucleated RBC 0.00 /100 " WBC    Lymphs (Absolute) 0.44 (L) 1.00 - 4.80 K/uL    Monos (Absolute) 0.46 0.00 - 0.85 K/uL    Eos (Absolute) 0.04 0.00 - 0.51 K/uL    Baso (Absolute) 0.05 0.00 - 0.12 K/uL    Immature Granulocytes (abs) 0.03 0.00 - 0.11 K/uL    NRBC (Absolute) 0.00 K/uL    Neutrophils (Absolute) 2.86 1.82 - 7.42 K/uL    Anisocytosis 1+     Macrocytosis 1+    Complete Metabolic Panel (CMP)   Result Value Ref Range    Sodium 134 (L) 135 - 145 mmol/L    Potassium 3.6 3.6 - 5.5 mmol/L    Chloride 104 96 - 112 mmol/L    Co2 19 (L) 20 - 33 mmol/L    Anion Gap 11.0 0.0 - 11.9    Glucose 198 (H) 65 - 99 mg/dL    Bun 18 8 - 22 mg/dL    Creatinine 1.08 0.50 - 1.40 mg/dL    Calcium 9.1 8.4 - 10.2 mg/dL    AST(SGOT) 20 12 - 45 U/L    ALT(SGPT) 26 2 - 50 U/L    Alkaline Phosphatase 54 30 - 99 U/L    Total Bilirubin 1.4 0.1 - 1.5 mg/dL    Albumin 3.9 3.2 - 4.9 g/dL    Total Protein 7.6 6.0 - 8.2 g/dL    Globulin 3.7 (H) 1.9 - 3.5 g/dL    A-G Ratio 1.1 g/dL   Troponin STAT   Result Value Ref Range    Troponin I <0.02 0.00 - 0.04 ng/mL   Btype Natriuretic Peptide   Result Value Ref Range    B Natriuretic Peptide 371 (H) 0 - 100 pg/mL   LACTIC ACID   Result Value Ref Range    Lactic Acid 3.0 (H) 0.5 - 2.0 mmol/L   LACTIC ACID   Result Value Ref Range    Lactic Acid 2.2 (H) 0.5 - 2.0 mmol/L   APTT   Result Value Ref Range    APTT 30.0 24.7 - 36.0 sec   PT/INR   Result Value Ref Range    PT 17.4 (H) 12.0 - 14.6 sec    INR 1.44 (H) 0.87 - 1.13   Influenza By PCR, A/B   Result Value Ref Range    Influenza virus A RNA Negative Negative    Influenza virus B, PCR Negative Negative   CRP QUANTITIVE (NON-CARDIAC)   Result Value Ref Range    Stat C-Reactive Protein 0.15 0.00 - 0.75 mg/dL   Fasting Lipid Panel   Result Value Ref Range    Cholesterol,Tot 158 100 - 199 mg/dL    Triglycerides 122 0 - 149 mg/dL    HDL 32 (A) >=40 mg/dL     (H) <100 mg/dL   WESTERGREN SED RATE   Result Value Ref Range    Sed Rate Westergren 6 0 - 20 mm/hour   MORPHOLOGY    Result Value Ref Range    RBC Morphology Present    DIFFERENTIAL COMMENT   Result Value Ref Range    Comments-Diff see below    ESTIMATED GFR   Result Value Ref Range    GFR If African American >60 >60 mL/min/1.73 m 2    GFR If Non African American >60 >60 mL/min/1.73 m 2   Magnesium   Result Value Ref Range    Magnesium 1.9 1.5 - 2.5 mg/dL   EKG   Result Value Ref Range    Report       [facility] Emergency Dept.    Test Date:  2019  Pt Name:    MARZENA ROMEO             Department: EDSM  MRN:        [id number]                      [facility]  Gender:     Male                         Technician: [id number]  :        1950                   Requested By:ER TRIAGE PROTOCOL  Order #:    [id number]                    Reading MD: ERIN HOLLINGSWORTH DO    Measurements  Intervals                                Axis  Rate:       92                           P:          44  MN:         179                          QRS:        -41  QRSD:       114                          T:          122  QT:         393  QTc:        487    Interpretive Statements  Sinus rhythm  Left anterior fascicular block  LVH with secondary repolarization abnormality  Anterior infarct, old  Compared to ECG 09/15/2018 11:29:44  Left anterior fascicular block now present  Myocardial infarct finding now present  Left bundle-branch block no longer present  Q waves  no longer present    Electronically Signed On 1-3-2019 8:47:06 PST by ERIN HOLLINGSWORTH DO           COURSE & MEDICAL DECISION MAKING  Pertinent Labs & Imaging studies reviewed. (See chart for details)  This is a 68-year-old gentleman presents with rash and probable cellulitis.  The patient has a normal leukocytosis, is afebrile he does have a lactic acidosis of 3.0.  Blood cultures and lactic acid are drawn.  Received 1 L normal saline fluid secondary to his lactic acidosis and has had slight improvement in his vital signs.  In addition, the  patient is complaining of chest discomfort with a cardiac history is no evidence of an elevation myocardial infarction with a negative troponin as well.  The patient received Decadron 8 mg IV, Unasyn for the probable cellulitis and will be admitted aeration and management the patient does not have evidence of Waters-Parish syndrome I do not believe he meets requirement for a burn unit.      FINAL IMPRESSION  Rash  Cellulitis  Chest pain  Lactic acidosis         Electronically signed by: Britton Gomez, 1/3/2019 1:00 PM

## 2019-01-03 NOTE — PROGRESS NOTES
2 RN skin check compeleted.  Generalized red rash observed, worse to upper back and gluteal folds.  Some scabs observed to gluteal folds.   No open areas..

## 2019-01-03 NOTE — H&P
Hospital Medicine History and Physical    Date of Service  1/3/2019    Chief Complaint  Chief Complaint   Patient presents with   • Chest Pain   • Shortness of Breath       History of Presenting Illness  68 y.o. male with a past medical history of CAD, hx of stent placement, hx of PAF on eliquis, HTN, HLD, hx of agent orange exposure and Polycythemia vera on hydrea,  presented 1/3/2019 to the ER for evaluation of Multiple symptoms including shortness of breath, diffuse rash, chest discomfort.  Patient said after he was started on hydroxyurea about a month ago started having a rash after that, although initially started on steroids provided brief relief his rash across his whole body has worsened.  Patient says it the severity has caused him to have pain especially in his buttocks area and all over his legs.  He feels also having slight chest discomfort more in line with having shortness of breath, with no exacerbating or relieving factors, denies have any productive cough, denies orthopnea.   At this point patient will be admitted for supportive therapy, IV steroids, IV hydration and pain control.                Primary Care Physician  CONNER Harman.    Consultants  None    Code Status  Code: Full code    Review of Systems  Review of Systems   Constitutional: Positive for malaise/fatigue. Negative for chills and fever.   HENT: Negative for congestion, hearing loss, sore throat and tinnitus.    Eyes: Negative for blurred vision, double vision, photophobia and pain.   Respiratory: Negative for cough, hemoptysis, sputum production, shortness of breath and stridor.    Cardiovascular: Negative for chest pain, palpitations, orthopnea, claudication and PND.   Gastrointestinal: Negative for blood in stool, constipation, heartburn, melena, nausea and vomiting.   Genitourinary: Negative for dysuria, frequency and urgency.   Musculoskeletal: Positive for myalgias. Negative for back pain and neck pain.   Skin: Positive  for itching and rash.   Neurological: Positive for weakness. Negative for dizziness, tingling, tremors, sensory change, speech change and headaches.   Psychiatric/Behavioral: Negative for depression, memory loss and suicidal ideas. The patient is not nervous/anxious.           Past Medical History  Past Medical History:   Diagnosis Date   • Orthostatic hypotension 3/29/2018   • Heart attack (HCC) 03/2017   • Stroke (HCC) 2016    r/t afib; eye issues resolved afterward   • Family history of punctured lung 2002    left lung   • Anesthesia     resistant to pain meds   • Cancer (HCC)     polycythemia vera   • Diabetes (HCC)     insulin and oral meds   • Hemorrhagic disorder (HCC)     on blood thinners   • High cholesterol    • Ischemic cardiomyopathy    • Kidney stones     hx of kidney stones   • Pain     headache pain   • Polycythemia vera(238.4)    • Urinary bladder disorder     enlarged prostate, weak stream, difficulty urinating   • Vertigo        Surgical History  Past Surgical History:   Procedure Laterality Date   • TEMPORAL ARTERY BIOPSY Right 6/26/2018    Procedure: TEMPORAL ARTERY BIOPSY;  Surgeon: Rajendra Aguilar M.D.;  Location: SURGERY SAME DAY [facility] ORS;  Service: General   • CAROTID ENDARTERECTOMY Right 3/21/2018    Procedure: CAROTID ENDARTERECTOMY- W/EEG MONITORING;  Surgeon: Benny Morfin M.D.;  Location: SURGERY [facility];  Service: General   • APPENDECTOMY     • CATH PLACEMENT      right arm   • LAMINOTOMY      diskectomy; C4   • OTHER CARDIAC SURGERY      stents x 3       Medications  No current facility-administered medications on file prior to encounter.      Current Outpatient Prescriptions on File Prior to Encounter   Medication Sig Dispense Refill   • cetirizine (ZYRTEC) 10 MG Tab Take 1 Tab by mouth every day. 30 Tab 1   • MethylPREDNISolone (MEDROL DOSEPAK) 4 MG Tablet Therapy Pack As directed on the packaging label. 21 Tab 0   • apixaban (ELIQUIS) 5mg Tab Take 1 Tab by mouth 2  "Times a Day. 180 Tab 3   • aspirin EC (ECOTRIN) 81 MG Tablet Delayed Response Take 1 Tab by mouth every day. 30 Tab    • therapeutic multivitamin-minerals (THERAGRAN-M) Tab Take 1 Tab by mouth every day.     • hydroxyurea (HYDREA) 500 MG Cap Take 1,000 mg by mouth every evening.     • tamsulosin (FLOMAX) 0.4 MG capsule Take 0.4 mg by mouth every evening.     • finasteride (PROSCAR) 5 MG Tab Take 5 mg by mouth every day.     • colesevelam (WELCHOL) 625 MG Tab Take 625 mg by mouth 2 times a day, with meals.     • glipiZIDE (GLUCOTROL) 10 MG Tab Take 15 mg by mouth every day.         Family History  Denies family history of blood disorders    Social History  Social History   Substance Use Topics   • Smoking status: Never Smoker   • Smokeless tobacco: Never Used   • Alcohol use No       Allergies  Allergies   Allergen Reactions   • Beta Adrenergic Blockers Unspecified     dizziness   • Metformin Unspecified     \"Similar to a heart attack, I was hospitalized\"   • Simvastatin      Myalgias   • Statins [Hmg-Coa-R Inhibitors]      Muscle ache, joint pain        Physical Exam  Laboratory   Hemodynamics  Temp (24hrs), Av °C (98.6 °F), Min:37 °C (98.6 °F), Max:37 °C (98.6 °F)   Temperature: 37 °C (98.6 °F)  Pulse  Av  Min: 71  Max: 88 Heart Rate (Monitored): 91  Blood Pressure : 120/76, NIBP: 140/86      Respiratory      Respiration: (!) 21, Pulse Oximetry: 96 %             Physical Exam   Constitutional: He is oriented to person, place, and time. He appears well-developed and well-nourished. No distress.   HENT:   Head: Normocephalic and atraumatic.   Mouth/Throat: No oropharyngeal exudate.   Eyes: Pupils are equal, round, and reactive to light. Conjunctivae are normal. Right eye exhibits no discharge. No scleral icterus.   Neck: Neck supple. No JVD present. No thyromegaly present.   Cardiovascular: Normal rate and intact distal pulses.    No murmur heard.  Pulses:       Dorsalis pedis pulses are 2+ on the right " side, and 2+ on the left side.   Cap refill < 3 s   Pulmonary/Chest: Effort normal and breath sounds normal. No stridor. No respiratory distress. He has no wheezes. He has no rales.   Abdominal: Soft. Bowel sounds are normal. He exhibits no distension. There is no tenderness. There is no rebound.   Musculoskeletal: Normal range of motion. He exhibits no edema.   Neurological: He is alert and oriented to person, place, and time. No cranial nerve deficit.   Skin: Skin is warm. Rash noted. He is not diaphoretic. There is erythema.   Scattered erythema across trunk, upper extremities lower extremities and buttocks area, no discharge, no warmth   Psychiatric: He has a normal mood and affect. His behavior is normal. Thought content normal.   Nursing note and vitals reviewed.        Assessment/Plan  * Erythromelalgia (HCC)   Assessment & Plan    Probably Erythromelalgia , given patient has P.Vera.  Has trialed steroids 60 mg of steroids which worked but recently weaned off.  Trial IV solumedrol.  Started gabapentin for pain.  Benadryl cream  Ordered.  Patient already on aspirin which is one of the treatments.     CAD (coronary artery disease)- (present on admission)   Assessment & Plan    Complicated by Pvera.  Denies chest pain  Resume eliquis.      Type 2 diabetes mellitus with complication, without long-term current use of insulin ([facility])- (present on admission)   Assessment & Plan    a1c 5 months ago was 8.7  Resume glipizide  Continue Insulin-sliding scale, accu-checks and hypoglycemia protocol.         Paroxysmal atrial fibrillation (HCC)- (present on admission)   Assessment & Plan    Rate controlled  Resume eliquis      BPH (benign prostatic hyperplasia)- (present on admission)   Assessment & Plan    Continue with proscar, no acute issues     Polycythemia vera (HCC)   Assessment & Plan    Hx of , on hydurea, follows with hematology.  Counts overall stable, CTM.     Elevated lactic acid level- (present on admission)    Assessment & Plan    Probably 2/2 to dehydration  Continue to trend q3 hours until normalization         I anticipate this patient is appropriate for observation status at this time.    Prophylaxis: apixaban    Recent Labs      01/03/19   0936   WBC  3.9*   RBC  4.11*   HEMOGLOBIN  14.7   HEMATOCRIT  43.9   MCV  106.8*   MCH  35.8*   MCHC  33.5*   RDW  73.8*   PLATELETCT  138*   MPV  11.9     Recent Labs      01/03/19   0936   SODIUM  134*   POTASSIUM  3.6   CHLORIDE  104   CO2  19*   GLUCOSE  198*   BUN  18   CREATININE  1.08   CALCIUM  9.1     Recent Labs      01/03/19   0936   ALTSGPT  26   ASTSGOT  20   ALKPHOSPHAT  54   TBILIRUBIN  1.4   GLUCOSE  198*     Recent Labs      01/03/19   0936   APTT  30.0   INR  1.44*     Recent Labs      01/03/19   0936   BNPBTYPENAT  371*     Recent Labs      01/03/19   0936   TRIGLYCERIDE  122   HDL  32*   LDL  102*     Lab Results   Component Value Date    TROPONINI <0.02 01/03/2019       Imaging  DX-CHEST-PORTABLE (1 VIEW)   Final Result      Borderline cardiomegaly.

## 2019-01-03 NOTE — ED NOTES
"PT presents with a history of diabetes, coronary disease, ischemic cardiomyopathy,and polycythemia vera. \"For the past few days\" he has been experiencing worsening dyspnea at rest with increasing \"chest congestion.\"    "

## 2019-01-03 NOTE — DISCHARGE PLANNING
Anticipated Discharge Disposition: home with spouse    Action: SW met with this patient bedside to complete assessment.  Patient reportedly lives at home with his spouse and plans to return home when medically able.  Patient has no HX of HH or DME use.  Patient reported that he is independent with all ADLs including driving self. Patient stated that he has completed DPOA paperwork and that it names his spouse, Alysa as decision maker.  SW asked that a copy be brought in to be scanned into chart.     Plan: SW believes that patient is being admitted.     Care Transition Team Assessment    Information Source  Orientation : Oriented x 4  Information Given By: Patient  Informant's Name: Francesco  Who is responsible for making decisions for patient? : Patient    Readmission Evaluation  Is this a readmission?: No    Elopement Risk  Legal Hold: No  Ambulatory or Self Mobile in Wheelchair: No-Not an Elopement Risk    Interdisciplinary Discharge Planning  Primary Care Physician: Oscar (Just saw Roderick last friday)  Lives with - Patient's Self Care Capacity: Spouse  Patient Expects to be Discharged to:: Home    Discharge Preparedness  What is your plan after discharge?: Home with help  What are your discharge supports?: Spouse  Prior Functional Level: Drives Self, Independent with Activities of Daily Living    Functional Assesment  Prior Functional Level: Drives Self, Independent with Activities of Daily Living    Finances  Financial Barriers to Discharge: No  Prescription Coverage: Yes    Advance Directive  Advance Directive?: DPOA for Health Care  Durable Power of  Name and Contact : Aylsa  -TIFFANIE asked patient to have copy be brought in to be scanned into his chart.     Domestic Abuse  Have you ever been the victim of abuse or violence?: No    Psychological Assessment  History of Substance Abuse: None  History of Psychiatric Problems: No    Discharge Risks or Barriers  Discharge risks or barriers?: No    Anticipated  Discharge Information  Anticipated discharge disposition: Home

## 2019-01-03 NOTE — CARE PLAN
Problem: Skin Integrity  Goal: Risk for impaired skin integrity will decrease  Will monitor skin rash. IV steriods given. Will control pain with PRN pain meds.     Problem: Knowledge Deficit  Goal: Knowledge of disease process/condition, treatment plan, diagnostic tests, and medications will improve  POC discussed. Pt understands plan is to receive IV AB and steroids. All questions and concerns addressed.

## 2019-01-03 NOTE — ED NOTES
Med Rec completed per patient  Allergies reviewed  No ORAL antibiotics in last 30 days      Pt completed a Medrol Dosepak yesterday 1/2/18    Pt stopped taking Zyrtec yesterday 1/2/18    Pt has been taking 20 mg of Prednisone for the last 4 days, this was a left over medication

## 2019-01-03 NOTE — TELEPHONE ENCOUNTER
From: Francesco Schulte  To: ALVINO Harman  Sent: 1/2/2019 6:30 PM PST  Subject: RE: Procedure Question    Medeol did nothing. Continued to get worse on chest back hands and face    ----- Message -----  From: ALVINO Harman  Sent: 1/2/19, 6:26 PM  To: Bob Schulte  Subject: RE: Procedure Question    José Miguel Maya,  Did the Medrol Dosepak make a difference at all? Your rash does not appear consistent with shingles at all, shingles rash always follows what we call a dermatome and only presents on half the body.  Cheyenne GOLDEN      ----- Message -----   From: Bob Schulte   Sent: 1/2/2019 4:17 PM PST   To: ALVINO Harman  Subject: Procedure Question    Rash getting extreme. Does the Lab test tomorrow include Shingles? If not please add. Meds over. Thank you

## 2019-01-03 NOTE — ED NOTES
1215 Admitting orders received. Waiting for bed assignment.  1250 report called update given to pt

## 2019-01-03 NOTE — ED NOTES
Iv placed , labs bld cx x 1  drawn and taken to lab.   Nasal flu swab done , specimen to lab poc update given to pt, results pending at this time

## 2019-01-03 NOTE — ASSESSMENT & PLAN NOTE
Sugars severely elevated on steroids probably  The cause of blurred vision  Titrating insulin  Cutting back steroids

## 2019-01-03 NOTE — PROGRESS NOTES
Received pt via [facility] [place]. Ambulated to bed, steady.  Pt is AAO x4.  Pt reports a 4/10 generalized pain level.  Declines med intervention at this time.  Generalized rash observed, especially to backside.  VS WNL.  R PIV patent, running fluids.  Pt oriented to room.  POC discussed.  All needs met at this time.  Bed in low position.  Call light within reach.  Rounding in place.

## 2019-01-04 ENCOUNTER — PATIENT OUTREACH (OUTPATIENT)
Dept: HEALTH INFORMATION MANAGEMENT | Facility: OTHER | Age: 69
End: 2019-01-04

## 2019-01-04 LAB
ALBUMIN SERPL BCP-MCNC: 3.4 G/DL (ref 3.2–4.9)
ALBUMIN/GLOB SERPL: 1 G/DL
ALP SERPL-CCNC: 52 U/L (ref 30–99)
ALT SERPL-CCNC: 21 U/L (ref 2–50)
ANION GAP SERPL CALC-SCNC: 7 MMOL/L (ref 0–11.9)
AST SERPL-CCNC: 16 U/L (ref 12–45)
BASOPHILS # BLD AUTO: 0.3 % (ref 0–1.8)
BASOPHILS # BLD: 0.01 K/UL (ref 0–0.12)
BILIRUB SERPL-MCNC: 0.7 MG/DL (ref 0.1–1.5)
BUN SERPL-MCNC: 23 MG/DL (ref 8–22)
CALCIUM SERPL-MCNC: 8.3 MG/DL (ref 8.4–10.2)
CHLORIDE SERPL-SCNC: 108 MMOL/L (ref 96–112)
CO2 SERPL-SCNC: 18 MMOL/L (ref 20–33)
CREAT SERPL-MCNC: 0.9 MG/DL (ref 0.5–1.4)
EOSINOPHIL # BLD AUTO: 0 K/UL (ref 0–0.51)
EOSINOPHIL NFR BLD: 0 % (ref 0–6.9)
ERYTHROCYTE [DISTWIDTH] IN BLOOD BY AUTOMATED COUNT: 72 FL (ref 35.9–50)
GLOBULIN SER CALC-MCNC: 3.4 G/DL (ref 1.9–3.5)
GLUCOSE BLD-MCNC: 302 MG/DL (ref 65–99)
GLUCOSE BLD-MCNC: 357 MG/DL (ref 65–99)
GLUCOSE BLD-MCNC: 402 MG/DL (ref 65–99)
GLUCOSE BLD-MCNC: 470 MG/DL (ref 65–99)
GLUCOSE BLD-MCNC: 485 MG/DL (ref 65–99)
GLUCOSE SERPL-MCNC: 321 MG/DL (ref 65–99)
HCT VFR BLD AUTO: 39.2 % (ref 42–52)
HGB BLD-MCNC: 13.2 G/DL (ref 14–18)
IMM GRANULOCYTES # BLD AUTO: 0.04 K/UL (ref 0–0.11)
IMM GRANULOCYTES NFR BLD AUTO: 1 % (ref 0–0.9)
LACTATE BLD-SCNC: 1.5 MMOL/L (ref 0.5–2)
LACTATE BLD-SCNC: 1.7 MMOL/L (ref 0.5–2)
LACTATE BLD-SCNC: 2.7 MMOL/L (ref 0.5–2)
LYMPHOCYTES # BLD AUTO: 0.21 K/UL (ref 1–4.8)
LYMPHOCYTES NFR BLD: 5.4 % (ref 22–41)
MCH RBC QN AUTO: 35.7 PG (ref 27–33)
MCHC RBC AUTO-ENTMCNC: 33.7 G/DL (ref 33.7–35.3)
MCV RBC AUTO: 105.9 FL (ref 81.4–97.8)
MONOCYTES # BLD AUTO: 0.09 K/UL (ref 0–0.85)
MONOCYTES NFR BLD AUTO: 2.3 % (ref 0–13.4)
NEUTROPHILS # BLD AUTO: 3.55 K/UL (ref 1.82–7.42)
NEUTROPHILS NFR BLD: 91 % (ref 44–72)
NRBC # BLD AUTO: 0 K/UL
NRBC BLD-RTO: 0 /100 WBC
PLATELET # BLD AUTO: 131 K/UL (ref 164–446)
PMV BLD AUTO: 11.7 FL (ref 9–12.9)
POTASSIUM SERPL-SCNC: 3.7 MMOL/L (ref 3.6–5.5)
PROT SERPL-MCNC: 6.8 G/DL (ref 6–8.2)
RBC # BLD AUTO: 3.7 M/UL (ref 4.7–6.1)
SODIUM SERPL-SCNC: 133 MMOL/L (ref 135–145)
WBC # BLD AUTO: 3.9 K/UL (ref 4.8–10.8)

## 2019-01-04 PROCEDURE — 83605 ASSAY OF LACTIC ACID: CPT | Mod: 91

## 2019-01-04 PROCEDURE — 80053 COMPREHEN METABOLIC PANEL: CPT

## 2019-01-04 PROCEDURE — 700102 HCHG RX REV CODE 250 W/ 637 OVERRIDE(OP): Performed by: INTERNAL MEDICINE

## 2019-01-04 PROCEDURE — 85025 COMPLETE CBC W/AUTO DIFF WBC: CPT

## 2019-01-04 PROCEDURE — 82962 GLUCOSE BLOOD TEST: CPT | Mod: 91

## 2019-01-04 PROCEDURE — 36415 COLL VENOUS BLD VENIPUNCTURE: CPT

## 2019-01-04 PROCEDURE — 700111 HCHG RX REV CODE 636 W/ 250 OVERRIDE (IP): Performed by: HOSPITALIST

## 2019-01-04 PROCEDURE — A9270 NON-COVERED ITEM OR SERVICE: HCPCS | Performed by: INTERNAL MEDICINE

## 2019-01-04 PROCEDURE — 700105 HCHG RX REV CODE 258: Performed by: HOSPITALIST

## 2019-01-04 PROCEDURE — 96372 THER/PROPH/DIAG INJ SC/IM: CPT

## 2019-01-04 PROCEDURE — 770006 HCHG ROOM/CARE - MED/SURG/GYN SEMI*

## 2019-01-04 PROCEDURE — A9270 NON-COVERED ITEM OR SERVICE: HCPCS | Performed by: HOSPITALIST

## 2019-01-04 PROCEDURE — 700102 HCHG RX REV CODE 250 W/ 637 OVERRIDE(OP): Performed by: HOSPITALIST

## 2019-01-04 PROCEDURE — 96376 TX/PRO/DX INJ SAME DRUG ADON: CPT

## 2019-01-04 PROCEDURE — 99232 SBSQ HOSP IP/OBS MODERATE 35: CPT | Performed by: INTERNAL MEDICINE

## 2019-01-04 RX ORDER — TEMAZEPAM 15 MG/1
15 CAPSULE ORAL
Status: DISCONTINUED | OUTPATIENT
Start: 2019-01-04 | End: 2019-01-06 | Stop reason: HOSPADM

## 2019-01-04 RX ORDER — INSULIN GLARGINE 100 [IU]/ML
0.2 INJECTION, SOLUTION SUBCUTANEOUS EVERY EVENING
Status: DISCONTINUED | OUTPATIENT
Start: 2019-01-04 | End: 2019-01-06 | Stop reason: HOSPADM

## 2019-01-04 RX ORDER — HYDROXYZINE HYDROCHLORIDE 25 MG/1
25 TABLET, FILM COATED ORAL 3 TIMES DAILY PRN
Status: DISCONTINUED | OUTPATIENT
Start: 2019-01-04 | End: 2019-01-06 | Stop reason: HOSPADM

## 2019-01-04 RX ORDER — GABAPENTIN 400 MG/1
400 CAPSULE ORAL 3 TIMES DAILY
Status: DISCONTINUED | OUTPATIENT
Start: 2019-01-04 | End: 2019-01-06 | Stop reason: HOSPADM

## 2019-01-04 RX ORDER — DEXTROSE MONOHYDRATE 25 G/50ML
25 INJECTION, SOLUTION INTRAVENOUS
Status: DISCONTINUED | OUTPATIENT
Start: 2019-01-04 | End: 2019-01-06 | Stop reason: HOSPADM

## 2019-01-04 RX ADMIN — CETIRIZINE HYDROCHLORIDE 10 MG: 10 TABLET, FILM COATED ORAL at 06:02

## 2019-01-04 RX ADMIN — INSULIN GLARGINE 20 UNITS: 100 INJECTION, SOLUTION SUBCUTANEOUS at 17:39

## 2019-01-04 RX ADMIN — INSULIN LISPRO 7 UNITS: 100 INJECTION, SOLUTION INTRAVENOUS; SUBCUTANEOUS at 17:39

## 2019-01-04 RX ADMIN — FINASTERIDE 5 MG: 5 TABLET, FILM COATED ORAL at 06:01

## 2019-01-04 RX ADMIN — STANDARDIZED SENNA CONCENTRATE AND DOCUSATE SODIUM 2 TABLET: 8.6; 5 TABLET, FILM COATED ORAL at 17:34

## 2019-01-04 RX ADMIN — OXYCODONE HYDROCHLORIDE AND ACETAMINOPHEN 2 TABLET: 5; 325 TABLET ORAL at 01:59

## 2019-01-04 RX ADMIN — APIXABAN 5 MG: 5 TABLET, FILM COATED ORAL at 17:34

## 2019-01-04 RX ADMIN — INSULIN HUMAN 4 UNITS: 100 INJECTION, SOLUTION PARENTERAL at 06:10

## 2019-01-04 RX ADMIN — COLESEVELAM HYDROCHLORIDE 625 MG: 625 TABLET, FILM COATED ORAL at 17:34

## 2019-01-04 RX ADMIN — GABAPENTIN 400 MG: 400 CAPSULE ORAL at 17:34

## 2019-01-04 RX ADMIN — METHYLPREDNISOLONE SODIUM SUCCINATE 125 MG: 125 INJECTION, POWDER, FOR SOLUTION INTRAMUSCULAR; INTRAVENOUS at 17:34

## 2019-01-04 RX ADMIN — GABAPENTIN 100 MG: 100 CAPSULE ORAL at 06:02

## 2019-01-04 RX ADMIN — COLESEVELAM HYDROCHLORIDE 625 MG: 625 TABLET, FILM COATED ORAL at 08:21

## 2019-01-04 RX ADMIN — SODIUM CHLORIDE: 9 INJECTION, SOLUTION INTRAVENOUS at 06:14

## 2019-01-04 RX ADMIN — HYDROXYUREA 1000 MG: 500 CAPSULE ORAL at 17:35

## 2019-01-04 RX ADMIN — Medication: at 18:00

## 2019-01-04 RX ADMIN — STANDARDIZED SENNA CONCENTRATE AND DOCUSATE SODIUM 2 TABLET: 8.6; 5 TABLET, FILM COATED ORAL at 06:01

## 2019-01-04 RX ADMIN — METHYLPREDNISOLONE SODIUM SUCCINATE 125 MG: 125 INJECTION, POWDER, FOR SOLUTION INTRAMUSCULAR; INTRAVENOUS at 11:10

## 2019-01-04 RX ADMIN — INSULIN LISPRO 7 UNITS: 100 INJECTION, SOLUTION INTRAVENOUS; SUBCUTANEOUS at 18:46

## 2019-01-04 RX ADMIN — INSULIN HUMAN 5 UNITS: 100 INJECTION, SOLUTION PARENTERAL at 11:13

## 2019-01-04 RX ADMIN — APIXABAN 5 MG: 5 TABLET, FILM COATED ORAL at 06:02

## 2019-01-04 RX ADMIN — METHYLPREDNISOLONE SODIUM SUCCINATE 125 MG: 125 INJECTION, POWDER, FOR SOLUTION INTRAMUSCULAR; INTRAVENOUS at 06:00

## 2019-01-04 RX ADMIN — MULTIPLE VITAMINS W/ MINERALS TAB 1 TABLET: TAB at 06:02

## 2019-01-04 RX ADMIN — ASPIRIN 81 MG: 81 TABLET, COATED ORAL at 06:01

## 2019-01-04 RX ADMIN — OXYCODONE HYDROCHLORIDE AND ACETAMINOPHEN 2 TABLET: 5; 325 TABLET ORAL at 17:34

## 2019-01-04 RX ADMIN — TAMSULOSIN HYDROCHLORIDE 0.4 MG: 0.4 CAPSULE ORAL at 17:34

## 2019-01-04 RX ADMIN — GABAPENTIN 100 MG: 100 CAPSULE ORAL at 11:10

## 2019-01-04 ASSESSMENT — ENCOUNTER SYMPTOMS
SORE THROAT: 0
VOMITING: 0
HEADACHES: 1
DIARRHEA: 0
EYE DISCHARGE: 0
SINUS PAIN: 0
WEAKNESS: 1
DIAPHORESIS: 0
EYE REDNESS: 0
WEIGHT LOSS: 0
DIZZINESS: 1
COUGH: 0
NAUSEA: 0
SHORTNESS OF BREATH: 1
ABDOMINAL PAIN: 0
NERVOUS/ANXIOUS: 1

## 2019-01-04 ASSESSMENT — PAIN SCALES - GENERAL
PAINLEVEL_OUTOF10: 7
PAINLEVEL_OUTOF10: 4
PAINLEVEL_OUTOF10: 4
PAINLEVEL_OUTOF10: 5
PAINLEVEL_OUTOF10: 6
PAINLEVEL_OUTOF10: 4

## 2019-01-04 NOTE — PROGRESS NOTES
Pt is AAO x4.  Pt reports a 4/10 generalized pain level.  Declines med intervention at this time.  Generalized rash observed.  VS WNL.  R PIV patent, running fluids.  POC discussed.  All needs met at this time.  Bed in low position.  Call light within reach.  Rounding in

## 2019-01-04 NOTE — PROGRESS NOTES
Notified Dr. Stephens that pt has critical lactic of 4.0.  New orders received to give pt 3060ml NS bolus IV.

## 2019-01-04 NOTE — PROGRESS NOTES
Notified Dr. Pugh that pt has blood sugar over 400 x 2 consecutive.  Dr. CHAVEZ said he will put in for pt to get lantus for 22:00.

## 2019-01-04 NOTE — CARE PLAN
Problem: Pain Management  Goal: Pain level will decrease to patient's comfort goal  Will control pain with PRN pain meds and benadryl cream.     Problem: Mobility  Goal: Risk for activity intolerance will decrease  Goal will be to ambulate today. Pt agrees.

## 2019-01-05 ENCOUNTER — APPOINTMENT (OUTPATIENT)
Dept: RADIOLOGY | Facility: MEDICAL CENTER | Age: 69
DRG: 300 | End: 2019-01-05
Attending: INTERNAL MEDICINE
Payer: MEDICARE

## 2019-01-05 PROBLEM — R53.1 GENERALIZED WEAKNESS: Status: ACTIVE | Noted: 2019-01-05

## 2019-01-05 PROBLEM — R07.2 PRECORDIAL CHEST PAIN: Status: ACTIVE | Noted: 2019-01-05

## 2019-01-05 PROBLEM — R06.00 DYSPNEA: Status: ACTIVE | Noted: 2019-01-05

## 2019-01-05 LAB
CRP SERPL HS-MCNC: 0.23 MG/DL (ref 0–0.75)
D DIMER PPP IA.FEU-MCNC: 1.33 UG/ML (FEU) (ref 0–0.5)
EKG IMPRESSION: NORMAL
ERYTHROCYTE [SEDIMENTATION RATE] IN BLOOD BY WESTERGREN METHOD: 17 MM/HOUR (ref 0–20)
GLUCOSE BLD-MCNC: 236 MG/DL (ref 65–99)
GLUCOSE BLD-MCNC: 354 MG/DL (ref 65–99)
GLUCOSE BLD-MCNC: 358 MG/DL (ref 65–99)
GLUCOSE BLD-MCNC: 363 MG/DL (ref 65–99)
GLUCOSE BLD-MCNC: 391 MG/DL (ref 65–99)
TROPONIN I SERPL-MCNC: <0.02 NG/ML (ref 0–0.04)
VIT B12 SERPL-MCNC: 229 PG/ML (ref 211–911)

## 2019-01-05 PROCEDURE — A9270 NON-COVERED ITEM OR SERVICE: HCPCS | Performed by: HOSPITALIST

## 2019-01-05 PROCEDURE — 700111 HCHG RX REV CODE 636 W/ 250 OVERRIDE (IP): Performed by: INTERNAL MEDICINE

## 2019-01-05 PROCEDURE — 99233 SBSQ HOSP IP/OBS HIGH 50: CPT | Performed by: INTERNAL MEDICINE

## 2019-01-05 PROCEDURE — 86140 C-REACTIVE PROTEIN: CPT

## 2019-01-05 PROCEDURE — 93010 ELECTROCARDIOGRAM REPORT: CPT | Performed by: INTERNAL MEDICINE

## 2019-01-05 PROCEDURE — 71275 CT ANGIOGRAPHY CHEST: CPT

## 2019-01-05 PROCEDURE — 700102 HCHG RX REV CODE 250 W/ 637 OVERRIDE(OP): Performed by: HOSPITALIST

## 2019-01-05 PROCEDURE — 700117 HCHG RX CONTRAST REV CODE 255: Performed by: INTERNAL MEDICINE

## 2019-01-05 PROCEDURE — 85379 FIBRIN DEGRADATION QUANT: CPT

## 2019-01-05 PROCEDURE — 770020 HCHG ROOM/CARE - TELE (206)

## 2019-01-05 PROCEDURE — 82962 GLUCOSE BLOOD TEST: CPT | Mod: 91

## 2019-01-05 PROCEDURE — A9270 NON-COVERED ITEM OR SERVICE: HCPCS | Performed by: INTERNAL MEDICINE

## 2019-01-05 PROCEDURE — 93005 ELECTROCARDIOGRAM TRACING: CPT | Performed by: INTERNAL MEDICINE

## 2019-01-05 PROCEDURE — 82607 VITAMIN B-12: CPT

## 2019-01-05 PROCEDURE — 85652 RBC SED RATE AUTOMATED: CPT

## 2019-01-05 PROCEDURE — 700111 HCHG RX REV CODE 636 W/ 250 OVERRIDE (IP): Performed by: HOSPITALIST

## 2019-01-05 PROCEDURE — 84484 ASSAY OF TROPONIN QUANT: CPT

## 2019-01-05 PROCEDURE — 700102 HCHG RX REV CODE 250 W/ 637 OVERRIDE(OP): Performed by: INTERNAL MEDICINE

## 2019-01-05 RX ORDER — METHYLPREDNISOLONE SODIUM SUCCINATE 125 MG/2ML
62.5 INJECTION, POWDER, LYOPHILIZED, FOR SOLUTION INTRAMUSCULAR; INTRAVENOUS EVERY 6 HOURS
Status: DISCONTINUED | OUTPATIENT
Start: 2019-01-05 | End: 2019-01-06 | Stop reason: HOSPADM

## 2019-01-05 RX ADMIN — STANDARDIZED SENNA CONCENTRATE AND DOCUSATE SODIUM 2 TABLET: 8.6; 5 TABLET, FILM COATED ORAL at 18:06

## 2019-01-05 RX ADMIN — ASPIRIN 81 MG: 81 TABLET, COATED ORAL at 06:26

## 2019-01-05 RX ADMIN — COLESEVELAM HYDROCHLORIDE 625 MG: 625 TABLET, FILM COATED ORAL at 18:07

## 2019-01-05 RX ADMIN — INSULIN GLARGINE 20 UNITS: 100 INJECTION, SOLUTION SUBCUTANEOUS at 18:03

## 2019-01-05 RX ADMIN — IOHEXOL 75 ML: 350 INJECTION, SOLUTION INTRAVENOUS at 18:40

## 2019-01-05 RX ADMIN — APIXABAN 5 MG: 5 TABLET, FILM COATED ORAL at 06:25

## 2019-01-05 RX ADMIN — STANDARDIZED SENNA CONCENTRATE AND DOCUSATE SODIUM 2 TABLET: 8.6; 5 TABLET, FILM COATED ORAL at 06:25

## 2019-01-05 RX ADMIN — INSULIN LISPRO 7 UNITS: 100 INJECTION, SOLUTION INTRAVENOUS; SUBCUTANEOUS at 06:35

## 2019-01-05 RX ADMIN — OXYCODONE HYDROCHLORIDE AND ACETAMINOPHEN 2 TABLET: 5; 325 TABLET ORAL at 19:19

## 2019-01-05 RX ADMIN — INSULIN LISPRO 7 UNITS: 100 INJECTION, SOLUTION INTRAVENOUS; SUBCUTANEOUS at 11:24

## 2019-01-05 RX ADMIN — GABAPENTIN 400 MG: 400 CAPSULE ORAL at 06:25

## 2019-01-05 RX ADMIN — GABAPENTIN 400 MG: 400 CAPSULE ORAL at 18:05

## 2019-01-05 RX ADMIN — GABAPENTIN 400 MG: 400 CAPSULE ORAL at 11:16

## 2019-01-05 RX ADMIN — METHYLPREDNISOLONE SODIUM SUCCINATE 62.5 MG: 125 INJECTION, POWDER, FOR SOLUTION INTRAMUSCULAR; INTRAVENOUS at 11:15

## 2019-01-05 RX ADMIN — TAMSULOSIN HYDROCHLORIDE 0.4 MG: 0.4 CAPSULE ORAL at 18:05

## 2019-01-05 RX ADMIN — METHYLPREDNISOLONE SODIUM SUCCINATE 125 MG: 125 INJECTION, POWDER, FOR SOLUTION INTRAMUSCULAR; INTRAVENOUS at 06:26

## 2019-01-05 RX ADMIN — METHYLPREDNISOLONE SODIUM SUCCINATE 125 MG: 125 INJECTION, POWDER, FOR SOLUTION INTRAMUSCULAR; INTRAVENOUS at 00:30

## 2019-01-05 RX ADMIN — OXYCODONE HYDROCHLORIDE AND ACETAMINOPHEN 2 TABLET: 5; 325 TABLET ORAL at 02:00

## 2019-01-05 RX ADMIN — COLESEVELAM HYDROCHLORIDE 625 MG: 625 TABLET, FILM COATED ORAL at 08:21

## 2019-01-05 RX ADMIN — METHYLPREDNISOLONE SODIUM SUCCINATE 62.5 MG: 125 INJECTION, POWDER, FOR SOLUTION INTRAMUSCULAR; INTRAVENOUS at 18:02

## 2019-01-05 RX ADMIN — INSULIN LISPRO 7 UNITS: 100 INJECTION, SOLUTION INTRAVENOUS; SUBCUTANEOUS at 17:55

## 2019-01-05 RX ADMIN — MULTIPLE VITAMINS W/ MINERALS TAB 1 TABLET: TAB at 06:25

## 2019-01-05 RX ADMIN — Medication: at 08:21

## 2019-01-05 RX ADMIN — HYDROXYUREA 1000 MG: 500 CAPSULE ORAL at 18:06

## 2019-01-05 RX ADMIN — FINASTERIDE 5 MG: 5 TABLET, FILM COATED ORAL at 06:25

## 2019-01-05 RX ADMIN — APIXABAN 5 MG: 5 TABLET, FILM COATED ORAL at 18:05

## 2019-01-05 ASSESSMENT — ENCOUNTER SYMPTOMS
SHORTNESS OF BREATH: 1
VOMITING: 0
ABDOMINAL PAIN: 0
FOCAL WEAKNESS: 0
EYE DISCHARGE: 0
SINUS PAIN: 0
NAUSEA: 0
DIZZINESS: 1
SORE THROAT: 0
COUGH: 0
BLURRED VISION: 1
HEMOPTYSIS: 0
WEAKNESS: 1
EYE REDNESS: 0
WEIGHT LOSS: 0
SPUTUM PRODUCTION: 0
DIARRHEA: 0
HEADACHES: 1
NERVOUS/ANXIOUS: 1
DIAPHORESIS: 0

## 2019-01-05 ASSESSMENT — PAIN SCALES - GENERAL
PAINLEVEL_OUTOF10: 8
PAINLEVEL_OUTOF10: 1
PAINLEVEL_OUTOF10: 1
PAINLEVEL_OUTOF10: 5

## 2019-01-05 ASSESSMENT — PATIENT HEALTH QUESTIONNAIRE - PHQ9
2. FEELING DOWN, DEPRESSED, IRRITABLE, OR HOPELESS: NOT AT ALL
SUM OF ALL RESPONSES TO PHQ9 QUESTIONS 1 AND 2: 0
1. LITTLE INTEREST OR PLEASURE IN DOING THINGS: NOT AT ALL

## 2019-01-05 NOTE — PROGRESS NOTES
Hospital Medicine Daily Progress Note    Date of Service  1/4/2019    Chief Complaint  Itching, rash    Hospital Course    *Loquacious 68-year-old male with remote history of severe agent orange exposure, subsequently developed polycythemia vera complicated by TIA, coronary artery disease, diabetes.  At one point was on 1500 mg of Hydrea daily, titrated down to 1000 recently.  Sugars suddenly decreased during the month of December, and over the last few days he is developed worsening erythema, itching, scabs due to his polycythemia.  The rash was so severe it was actually painful*      Interval Problem Update  Discussed past medical history and medications  He feels much better after the Solu-Medrol was initiated  Discussed changes to the regimen and overall plan of care    Consultants/Specialty  None  Code Status  Full    Disposition  Home    Review of Systems  Review of Systems   Constitutional: Positive for malaise/fatigue. Negative for diaphoresis and weight loss.   HENT: Negative for sinus pain and sore throat.    Eyes: Negative for discharge and redness.   Respiratory: Positive for shortness of breath. Negative for cough.    Cardiovascular: Positive for chest pain (Intermittent, none currently).   Gastrointestinal: Negative for abdominal pain, diarrhea, nausea and vomiting.   Genitourinary: Negative for dysuria.   Musculoskeletal: Positive for joint pain.   Skin: Positive for itching and rash.   Neurological: Positive for dizziness, weakness and headaches.   Psychiatric/Behavioral: The patient is nervous/anxious.         Physical Exam  Temp:  [36.2 °C (97.2 °F)-36.5 °C (97.7 °F)] 36.3 °C (97.4 °F)  Pulse:  [67-80] 67  Resp:  [17-18] 17  BP: (127-151)/(61-74) 138/74    Physical Exam   Constitutional: He is oriented to person, place, and time. He appears well-developed and well-nourished. No distress.   HENT:   Head: Normocephalic and atraumatic.   Right Ear: External ear normal.   Left Ear: External ear normal.    Mouth/Throat: Oropharynx is clear and moist.   Eyes: Pupils are equal, round, and reactive to light. Conjunctivae and EOM are normal. Right eye exhibits no discharge. Left eye exhibits no discharge. No scleral icterus.   Neck: Neck supple.   Cardiovascular: Normal rate.    Irregular   Pulmonary/Chest: Effort normal.   Slightly diminished   Abdominal: Soft. He exhibits no distension. There is no tenderness.   Musculoskeletal: He exhibits no edema or tenderness.   Neurological: He is alert and oriented to person, place, and time. No cranial nerve deficit.   Skin: Skin is warm and dry. He is not diaphoretic.   Patchy dermatitis minimal erythema   Psychiatric:   Anxious   Nursing note and vitals reviewed.      Fluids    Intake/Output Summary (Last 24 hours) at 01/04/19 1659  Last data filed at 01/04/19 0826   Gross per 24 hour   Intake             4613 ml   Output             3580 ml   Net             1033 ml       Laboratory  Recent Labs      01/03/19   0936  01/04/19   0232   WBC  3.9*  3.9*   RBC  4.11*  3.70*   HEMOGLOBIN  14.7  13.2*   HEMATOCRIT  43.9  39.2*   MCV  106.8*  105.9*   MCH  35.8*  35.7*   MCHC  33.5*  33.7   RDW  73.8*  72.0*   PLATELETCT  138*  131*   MPV  11.9  11.7     Recent Labs      01/03/19   0936  01/04/19   0232   SODIUM  134*  133*   POTASSIUM  3.6  3.7   CHLORIDE  104  108   CO2  19*  18*   GLUCOSE  198*  321*   BUN  18  23*   CREATININE  1.08  0.90   CALCIUM  9.1  8.3*     Recent Labs      01/03/19   0936   APTT  30.0   INR  1.44*     Recent Labs      01/03/19   0936   BNPBTYPENAT  371*     Recent Labs      01/03/19   0936   TRIGLYCERIDE  122   HDL  32*   LDL  102*       Imaging  DX-CHEST-PORTABLE (1 VIEW)   Final Result      Borderline cardiomegaly.           Assessment/Plan  * Erythromelalgia (HCC)   Assessment & Plan    Probably Erythromelalgia , given patient has P.Vera.  Failed outpatient steroids  Much improved overnight on IV Solu-Medrol  Increased gabapentin, change Zyrtec to  Atarax  If stable will wean steroids tomorrow     CAD (coronary artery disease)- (present on admission)   Assessment & Plan    Intermittent chest pain, none currently continue to monitor     Type 2 diabetes mellitus with complication, without long-term current use of insulin (HCC)- (present on admission)   Assessment & Plan    Suddenly had very good glycemic control in December with episodes of hypoglycemia even off medications  Now hyperglycemic due to steroids  Sliding scale, basal insulin changed       Paroxysmal atrial fibrillation (HCC)- (present on admission)   Assessment & Plan    Rate controlled, continue Eliquis       BPH (benign prostatic hyperplasia)- (present on admission)   Assessment & Plan    Continue with proscar, no acute issues     Polycythemia vera (HCC)   Assessment & Plan    Hx of , on hydurea, follows with hematology.  Counts overall stable, CTM.     Elevated lactic acid level- (present on admission)   Assessment & Plan    Improving          VTE prophylaxis: Eliquis

## 2019-01-05 NOTE — PROGRESS NOTES
Cardiology brief note    Case discussed and reviewed with the admitting hospitalist.  I reviewed the patient's EKG, and history.  He does have coronary artery disease with previous stenting.  He has no objective evidence of ischemia though with chest discomfort.  My recommendation at this point is to rule him out with serial biomarkers and if negative, myocardial perfusion imaging tomorrow.    Please call back cardiology if he rules in either by biomarkers or stress test.    Taras Ramirez MD  Cardiologist, [facility]

## 2019-01-05 NOTE — PROGRESS NOTES
Received report from night RN.  Assumed pt care. Pt is resting comfortably.  Pt asking for breakfast.  Updated w/ POC.

## 2019-01-06 ENCOUNTER — APPOINTMENT (OUTPATIENT)
Dept: CARDIOLOGY | Facility: MEDICAL CENTER | Age: 69
DRG: 300 | End: 2019-01-06
Attending: INTERNAL MEDICINE
Payer: MEDICARE

## 2019-01-06 ENCOUNTER — APPOINTMENT (OUTPATIENT)
Dept: RADIOLOGY | Facility: MEDICAL CENTER | Age: 69
DRG: 300 | End: 2019-01-06
Attending: INTERNAL MEDICINE
Payer: MEDICARE

## 2019-01-06 VITALS
HEIGHT: 75 IN | RESPIRATION RATE: 18 BRPM | BODY MASS INDEX: 27.96 KG/M2 | HEART RATE: 71 BPM | WEIGHT: 224.87 LBS | SYSTOLIC BLOOD PRESSURE: 152 MMHG | TEMPERATURE: 97.6 F | DIASTOLIC BLOOD PRESSURE: 88 MMHG | OXYGEN SATURATION: 96 %

## 2019-01-06 PROBLEM — R07.2 PRECORDIAL CHEST PAIN: Status: RESOLVED | Noted: 2019-01-05 | Resolved: 2019-01-06

## 2019-01-06 PROBLEM — R06.00 DYSPNEA: Status: RESOLVED | Noted: 2019-01-05 | Resolved: 2019-01-06

## 2019-01-06 PROBLEM — R53.1 GENERALIZED WEAKNESS: Status: RESOLVED | Noted: 2019-01-05 | Resolved: 2019-01-06

## 2019-01-06 PROBLEM — R79.89 ELEVATED LACTIC ACID LEVEL: Status: RESOLVED | Noted: 2018-08-09 | Resolved: 2019-01-06

## 2019-01-06 PROBLEM — I73.81 ERYTHROMELALGIA (HCC): Status: RESOLVED | Noted: 2019-01-03 | Resolved: 2019-01-06

## 2019-01-06 LAB
ALBUMIN SERPL BCP-MCNC: 3.3 G/DL (ref 3.2–4.9)
ALBUMIN/GLOB SERPL: 1.1 G/DL
ALP SERPL-CCNC: 63 U/L (ref 30–99)
ALT SERPL-CCNC: 16 U/L (ref 2–50)
ANION GAP SERPL CALC-SCNC: 7 MMOL/L (ref 0–11.9)
AST SERPL-CCNC: 14 U/L (ref 12–45)
BASOPHILS # BLD AUTO: 0 % (ref 0–1.8)
BASOPHILS # BLD: 0 K/UL (ref 0–0.12)
BILIRUB SERPL-MCNC: 0.7 MG/DL (ref 0.1–1.5)
BUN SERPL-MCNC: 23 MG/DL (ref 8–22)
CALCIUM SERPL-MCNC: 8.8 MG/DL (ref 8.4–10.2)
CHLORIDE SERPL-SCNC: 101 MMOL/L (ref 96–112)
CO2 SERPL-SCNC: 24 MMOL/L (ref 20–33)
CREAT SERPL-MCNC: 0.9 MG/DL (ref 0.5–1.4)
EOSINOPHIL # BLD AUTO: 0 K/UL (ref 0–0.51)
EOSINOPHIL NFR BLD: 0 % (ref 0–6.9)
ERYTHROCYTE [DISTWIDTH] IN BLOOD BY AUTOMATED COUNT: 73.2 FL (ref 35.9–50)
GLOBULIN SER CALC-MCNC: 3 G/DL (ref 1.9–3.5)
GLUCOSE BLD-MCNC: 306 MG/DL (ref 65–99)
GLUCOSE BLD-MCNC: 330 MG/DL (ref 65–99)
GLUCOSE SERPL-MCNC: 314 MG/DL (ref 65–99)
HCT VFR BLD AUTO: 42.1 % (ref 42–52)
HGB BLD-MCNC: 14.1 G/DL (ref 14–18)
IMM GRANULOCYTES # BLD AUTO: 0.19 K/UL (ref 0–0.11)
IMM GRANULOCYTES NFR BLD AUTO: 2.2 % (ref 0–0.9)
LV EJECT FRACT  99904: 35
LV EJECT FRACT MOD 2C 99903: 56.95
LV EJECT FRACT MOD 4C 99902: 47.93
LV EJECT FRACT MOD BP 99901: 53.96
LYMPHOCYTES # BLD AUTO: 0.3 K/UL (ref 1–4.8)
LYMPHOCYTES NFR BLD: 3.5 % (ref 22–41)
MCH RBC QN AUTO: 36 PG (ref 27–33)
MCHC RBC AUTO-ENTMCNC: 33.5 G/DL (ref 33.7–35.3)
MCV RBC AUTO: 107.4 FL (ref 81.4–97.8)
MONOCYTES # BLD AUTO: 0.31 K/UL (ref 0–0.85)
MONOCYTES NFR BLD AUTO: 3.6 % (ref 0–13.4)
NEUTROPHILS # BLD AUTO: 7.7 K/UL (ref 1.82–7.42)
NEUTROPHILS NFR BLD: 90.7 % (ref 44–72)
NRBC # BLD AUTO: 0 K/UL
NRBC BLD-RTO: 0 /100 WBC
PLATELET # BLD AUTO: 130 K/UL (ref 164–446)
PMV BLD AUTO: 10.8 FL (ref 9–12.9)
POTASSIUM SERPL-SCNC: 3.3 MMOL/L (ref 3.6–5.5)
PROT SERPL-MCNC: 6.3 G/DL (ref 6–8.2)
RBC # BLD AUTO: 3.92 M/UL (ref 4.7–6.1)
SODIUM SERPL-SCNC: 132 MMOL/L (ref 135–145)
TROPONIN I SERPL-MCNC: <0.02 NG/ML (ref 0–0.04)
WBC # BLD AUTO: 8.5 K/UL (ref 4.8–10.8)

## 2019-01-06 PROCEDURE — 700105 HCHG RX REV CODE 258: Performed by: HOSPITALIST

## 2019-01-06 PROCEDURE — A9270 NON-COVERED ITEM OR SERVICE: HCPCS | Performed by: INTERNAL MEDICINE

## 2019-01-06 PROCEDURE — 700111 HCHG RX REV CODE 636 W/ 250 OVERRIDE (IP): Performed by: INTERNAL MEDICINE

## 2019-01-06 PROCEDURE — 700102 HCHG RX REV CODE 250 W/ 637 OVERRIDE(OP): Performed by: INTERNAL MEDICINE

## 2019-01-06 PROCEDURE — 700102 HCHG RX REV CODE 250 W/ 637 OVERRIDE(OP): Performed by: HOSPITALIST

## 2019-01-06 PROCEDURE — 700111 HCHG RX REV CODE 636 W/ 250 OVERRIDE (IP)

## 2019-01-06 PROCEDURE — 80053 COMPREHEN METABOLIC PANEL: CPT

## 2019-01-06 PROCEDURE — 85025 COMPLETE CBC W/AUTO DIFF WBC: CPT

## 2019-01-06 PROCEDURE — 93306 TTE W/DOPPLER COMPLETE: CPT | Mod: 26 | Performed by: INTERNAL MEDICINE

## 2019-01-06 PROCEDURE — A9270 NON-COVERED ITEM OR SERVICE: HCPCS | Performed by: HOSPITALIST

## 2019-01-06 PROCEDURE — 99239 HOSP IP/OBS DSCHRG MGMT >30: CPT | Performed by: HOSPITALIST

## 2019-01-06 PROCEDURE — 93306 TTE W/DOPPLER COMPLETE: CPT

## 2019-01-06 PROCEDURE — A9502 TC99M TETROFOSMIN: HCPCS

## 2019-01-06 PROCEDURE — 78452 HT MUSCLE IMAGE SPECT MULT: CPT | Mod: 26 | Performed by: INTERNAL MEDICINE

## 2019-01-06 PROCEDURE — 93018 CV STRESS TEST I&R ONLY: CPT | Performed by: INTERNAL MEDICINE

## 2019-01-06 PROCEDURE — 82962 GLUCOSE BLOOD TEST: CPT | Mod: 91

## 2019-01-06 RX ORDER — SODIUM CHLORIDE 9 MG/ML
INJECTION, SOLUTION INTRAVENOUS CONTINUOUS
Status: DISCONTINUED | OUTPATIENT
Start: 2019-01-06 | End: 2019-01-06 | Stop reason: HOSPADM

## 2019-01-06 RX ORDER — GABAPENTIN 400 MG/1
400 CAPSULE ORAL 3 TIMES DAILY
Qty: 90 CAP | Refills: 0 | Status: SHIPPED | OUTPATIENT
Start: 2019-01-06 | End: 2019-02-08 | Stop reason: CLARIF

## 2019-01-06 RX ORDER — PREDNISONE 20 MG/1
40 TABLET ORAL DAILY
Qty: 8 TAB | Refills: 0 | Status: SHIPPED | OUTPATIENT
Start: 2019-01-07 | End: 2019-01-11

## 2019-01-06 RX ORDER — OXYCODONE HYDROCHLORIDE AND ACETAMINOPHEN 5; 325 MG/1; MG/1
1-2 TABLET ORAL EVERY 6 HOURS PRN
Status: DISCONTINUED | OUTPATIENT
Start: 2019-01-06 | End: 2019-01-06 | Stop reason: HOSPADM

## 2019-01-06 RX ORDER — REGADENOSON 0.08 MG/ML
INJECTION, SOLUTION INTRAVENOUS
Status: COMPLETED
Start: 2019-01-06 | End: 2019-01-06

## 2019-01-06 RX ORDER — POTASSIUM CHLORIDE 20 MEQ/1
40 TABLET, EXTENDED RELEASE ORAL ONCE
Status: COMPLETED | OUTPATIENT
Start: 2019-01-06 | End: 2019-01-06

## 2019-01-06 RX ADMIN — ASPIRIN 81 MG: 81 TABLET, COATED ORAL at 06:56

## 2019-01-06 RX ADMIN — REGADENOSON 0.4 MG: 0.08 INJECTION, SOLUTION INTRAVENOUS at 08:38

## 2019-01-06 RX ADMIN — POTASSIUM CHLORIDE 40 MEQ: 1500 TABLET, EXTENDED RELEASE ORAL at 12:26

## 2019-01-06 RX ADMIN — COLESEVELAM HYDROCHLORIDE 625 MG: 625 TABLET, FILM COATED ORAL at 09:28

## 2019-01-06 RX ADMIN — INSULIN LISPRO 7 UNITS: 100 INJECTION, SOLUTION INTRAVENOUS; SUBCUTANEOUS at 07:06

## 2019-01-06 RX ADMIN — SODIUM CHLORIDE: 9 INJECTION, SOLUTION INTRAVENOUS at 11:45

## 2019-01-06 RX ADMIN — OXYCODONE HYDROCHLORIDE AND ACETAMINOPHEN 2 TABLET: 5; 325 TABLET ORAL at 01:41

## 2019-01-06 RX ADMIN — GABAPENTIN 400 MG: 400 CAPSULE ORAL at 06:56

## 2019-01-06 RX ADMIN — APIXABAN 5 MG: 5 TABLET, FILM COATED ORAL at 06:56

## 2019-01-06 RX ADMIN — FINASTERIDE 5 MG: 5 TABLET, FILM COATED ORAL at 06:56

## 2019-01-06 RX ADMIN — MULTIPLE VITAMINS W/ MINERALS TAB 1 TABLET: TAB at 06:56

## 2019-01-06 RX ADMIN — METHYLPREDNISOLONE SODIUM SUCCINATE 62.5 MG: 125 INJECTION, POWDER, FOR SOLUTION INTRAMUSCULAR; INTRAVENOUS at 12:26

## 2019-01-06 RX ADMIN — GABAPENTIN 400 MG: 400 CAPSULE ORAL at 12:26

## 2019-01-06 RX ADMIN — METHYLPREDNISOLONE SODIUM SUCCINATE 62.5 MG: 125 INJECTION, POWDER, FOR SOLUTION INTRAMUSCULAR; INTRAVENOUS at 06:54

## 2019-01-06 RX ADMIN — STANDARDIZED SENNA CONCENTRATE AND DOCUSATE SODIUM 2 TABLET: 8.6; 5 TABLET, FILM COATED ORAL at 06:56

## 2019-01-06 RX ADMIN — Medication: at 09:35

## 2019-01-06 RX ADMIN — METHYLPREDNISOLONE SODIUM SUCCINATE 62.5 MG: 125 INJECTION, POWDER, FOR SOLUTION INTRAMUSCULAR; INTRAVENOUS at 01:21

## 2019-01-06 RX ADMIN — INSULIN LISPRO 7 UNITS: 100 INJECTION, SOLUTION INTRAVENOUS; SUBCUTANEOUS at 12:24

## 2019-01-06 ASSESSMENT — ENCOUNTER SYMPTOMS
EYE REDNESS: 0
NAUSEA: 0
ABDOMINAL PAIN: 0
DIARRHEA: 0
WEAKNESS: 1
SINUS PAIN: 0
FOCAL WEAKNESS: 0
HEADACHES: 1
SPUTUM PRODUCTION: 0
SHORTNESS OF BREATH: 1
DIZZINESS: 1
COUGH: 0
DIAPHORESIS: 0
EYE DISCHARGE: 0
WEIGHT LOSS: 0
NERVOUS/ANXIOUS: 1
BLURRED VISION: 1
VOMITING: 0
SORE THROAT: 0
HEMOPTYSIS: 0

## 2019-01-06 ASSESSMENT — PAIN SCALES - GENERAL: PAINLEVEL_OUTOF10: 10

## 2019-01-06 NOTE — DISCHARGE SUMMARY
Discharge Summary    CHIEF COMPLAINT ON ADMISSION  Chief Complaint   Patient presents with   • Chest Pain   • Shortness of Breath       Reason for Admission  Chest Pain, Shortness of Breath, W*     Admission Date  1/3/2019    CODE STATUS  Full Code    HPI & HOSPITAL COURSE  This is a 68 y.o. male with past medical history of CAD status post stent placement, paroxysmal atrial fibrillation, hypertension, polycythemia vera here with diffuse rash, shortness of breath, and chest discomfort.  Patient was thought to have erythromelalgia and started on steroids as this is what he is taking in the past.  His symptoms are usually resolved with topical steroids but this had been ineffective for him this time around.  Patient has been treated with IV fluid hydration, gabapentin and IV Solu-Medrol which has significantly reduced his symptoms.  Patient will be discharged on 4 days of oral steroids and gabapentin.    Of note, as patient had been complaining of some precordial chest pain patient had stress test which did not show any areas of reversible ischemia and was consistent with his echocardiogram done in August.  Troponins remain negative and patient is no longer complaining of any chest discomfort after having some IV fluid hydration.       Therefore, he is discharged in good and stable condition to home with close outpatient follow-up.    The patient met 2-midnight criteria for an inpatient stay at the time of discharge.    Discharge Date  1/6/19    FOLLOW UP ITEMS POST DISCHARGE  Follow-up with oncology for further management of his erythromelalgia    DISCHARGE DIAGNOSES  Principal Problem (Resolved):    Erythromelalgia (HCC) POA: Unknown  Active Problems:    BPH (benign prostatic hyperplasia) POA: Yes    Paroxysmal atrial fibrillation (HCC) POA: Yes    Type 2 diabetes mellitus with complication, without long-term current use of insulin (HCC) POA: Yes      Overview: Chronic condition treated with Glipizide and Lantus.       Sliding scale insulin during acute hospitalization.          CAD (coronary artery disease) POA: Yes    Agent orange exposure POA: Yes    Polycythemia vera (HCC) POA: Unknown  Resolved Problems:    Elevated lactic acid level POA: Yes    Generalized weakness POA: Unknown    Dyspnea POA: Unknown    Precordial chest pain POA: Unknown      FOLLOW UP  Future Appointments  Date Time Provider Department Center   2/5/2019 3:00 PM AL[facility]INO Harman   3/14/2019 8:00 AM Liliana Song M.D. [facility] None     No follow-up provider specified.    MEDICATIONS ON DISCHARGE     Medication List      START taking these medications      Instructions   diphenhydrAMINE-ZnAcetate 1-0.1 % Crea  Commonly known as:  BENADRYL ITCH   Apply thin layer over effected area     gabapentin 400 MG Caps  Commonly known as:  NEURONTIN   Take 1 Cap by mouth 3 times a day.  Dose:  400 mg     predniSONE 20 MG Tabs  Start taking on:  1/7/2019  Commonly known as:  DELTASONE   Take 2 Tabs by mouth every day for 4 days.  Dose:  40 mg        CONTINUE taking these medications      Instructions   apixaban 5mg Tabs  Commonly known as:  ELIQUIS   Doctor's comments:  This prescription is being transmitted by a pharmacist working under a collaborative practice protocol.  Take 1 Tab by mouth 2 Times a Day.  Dose:  5 mg     aspirin EC 81 MG Tbec  Commonly known as:  ECOTRIN   Take 1 Tab by mouth every day.  Dose:  81 mg     cetirizine 10 MG Tabs  Commonly known as:  ZYRTEC   Take 1 Tab by mouth every day.  Dose:  10 mg     colesevelam 625 MG Tabs  Commonly known as:  WELCHOL   Take 625 mg by mouth 2 times a day, with meals.  Dose:  625 mg     finasteride 5 MG Tabs  Commonly known as:  PROSCAR   Take 5 mg by mouth every day.  Dose:  5 mg     glipiZIDE 10 MG Tabs  Commonly known as:  GLUCOTROL   Take 15 mg by mouth every day.  Dose:  15 mg     hydroxyurea 500 MG Caps  Commonly known as:  HYDREA   Take 1,000 mg by mouth every evening.  Dose:  1000 mg    "  oxyCODONE-acetaminophen 5-325 MG Tabs  Commonly known as:  PERCOCET   Take 1-2 Tabs by mouth every 8 hours as needed for Moderate Pain.  Dose:  1-2 Tab     tamsulosin 0.4 MG capsule  Commonly known as:  FLOMAX   Take 0.4 mg by mouth every evening.  Dose:  0.4 mg     therapeutic multivitamin-minerals Tabs   Take 1 Tab by mouth every day.  Dose:  1 Tab        STOP taking these medications    MethylPREDNISolone 4 MG Tbpk  Commonly known as:  MEDROL DOSEPAK            Allergies  Allergies   Allergen Reactions   • Beta Adrenergic Blockers Unspecified     dizziness   • Metformin Unspecified     \"Similar to a heart attack, I was hospitalized\"   • Simvastatin      Myalgias   • Statins [Hmg-Coa-R Inhibitors]      Muscle ache, joint pain       DIET  Orders Placed This Encounter   Procedures   • Diet Order Regular     Standing Status:   Standing     Number of Occurrences:   1     Order Specific Question:   Diet:     Answer:   Regular [1]     Order Specific Question:   Miscellaneous modifications:     Answer:   No Decaf, No Caffeine(for test) [11]     Comments:   Patient to have no caffeine for 12 hours prior to exam (decaf, coffee, cola, tea, chocolate)   • Discontinue Diet Tray     Standing Status:   Standing     Number of Occurrences:   1       ACTIVITY  As tolerated.  Weight bearing as tolerated    CONSULTATIONS  None    PROCEDURES  None    LABORATORY  Lab Results   Component Value Date    SODIUM 132 (L) 01/06/2019    POTASSIUM 3.3 (L) 01/06/2019    CHLORIDE 101 01/06/2019    CO2 24 01/06/2019    GLUCOSE 314 (H) 01/06/2019    BUN 23 (H) 01/06/2019    CREATININE 0.90 01/06/2019        Lab Results   Component Value Date    WBC 8.5 01/06/2019    HEMOGLOBIN 14.1 01/06/2019    HEMATOCRIT 42.1 01/06/2019    PLATELETCT 130 (L) 01/06/2019        Total time of the discharge process exceeds 34 minutes.  "

## 2019-01-06 NOTE — CARE PLAN
Problem: Pain Management  Goal: Pain level will decrease to patient's comfort goal  Teach Pt pain scale. Assess pain frequently. Encouraged Pt to report any pain or discomfort. Apply comfort measures. Administer pain medications as appropriate.     Problem: Infection  Goal: Will remain free from infection  Outcome: PROGRESSING AS EXPECTED  Assess for signs and symptoms of infection. Monitor lab values that indicate possible infection. Implement standard precautions. Perform hand washing.

## 2019-01-06 NOTE — PROGRESS NOTES
Report received from [facility], pt transferred in his bed.  Assessment done at this time and pt denies the chest pain but being more fatigued.  When talking about poc with stress test, wife is questioning it without cardiology; spoke with Dr. Parish who had spoke to cardiology.  D-Dimer results elevated and let Dr. Parish know and CTA ordered.  FS was 358 and coverage given and oral medications; pt wanted to wait until he returned to get the percocet and then do the cream as it burns.  Report given to Anni.    Tele strip at 1600 shows SR at 61.      Measurements from am strip were as follows:  DC=0.20  QRS=0.10  QT=0.42    Tele Shift Summary:    Rhythm : SR  Rate : 60-70s  Ectopy : Per CCT Viviana, pt had ectopy frequent PVCs, couplets, trigeminy and bigeminy.     Telemetry monitoring strips placed in pt's chart.

## 2019-01-06 NOTE — PROGRESS NOTES
"Pt requests \"Percocet\" told him it is not due until a couple of hours. Offered to give Naproxen but he refused it. Pt gets irritated. Called Dr Stephens and obtained order to give Percocet q6hrs instead q8hrs.   "

## 2019-01-06 NOTE — DISCHARGE INSTRUCTIONS
Discharge Instructions    Discharged to home by car with relative. Discharged via wheelchair, hospital escort: Yes.  Special equipment needed: Not Applicable    Be sure to schedule a follow-up appointment with your primary care doctor or any specialists as instructed.     Discharge Plan:   Diet Plan: Discussed  Activity Level: Discussed  Confirmed Follow up Appointment: Appointment Scheduled  Confirmed Symptoms Management: Discussed  Medication Reconciliation Updated: Yes  Influenza Vaccine Indication: Not indicated: Previously immunized this influenza season and > 8 years of age    I understand that a diet low in cholesterol, fat, and sodium is recommended for good health. Unless I have been given specific instructions below for another diet, I accept this instruction as my diet prescription.   Other diet: heart healthy, or otherwise prescribed    Special Instructions: None    · Is patient discharged on Warfarin / Coumadin?   No       Depression / Suicide Risk    As you are discharged from this [facility] facility, it is important to learn how to keep safe from harming yourself.    Recognize the warning signs:  · Abrupt changes in personality, positive or negative- including increase in energy   · Giving away possessions  · Change in eating patterns- significant weight changes-  positive or negative  · Change in sleeping patterns- unable to sleep or sleeping all the time   · Unwillingness or inability to communicate  · Depression  · Unusual sadness, discouragement and loneliness  · Talk of wanting to die  · Neglect of personal appearance   · Rebelliousness- reckless behavior  · Withdrawal from people/activities they love  · Confusion- inability to concentrate     If you or a loved one observes any of these behaviors or has concerns about self-harm, here's what you can do:  · Talk about it- your feelings and reasons for harming yourself  · Remove any means that you might use to hurt yourself (examples: pills,  rope, extension cords, firearm)  · Get professional help from the community (Mental Health, Substance Abuse, psychological counseling)  · Do not be alone:Call your Safe Contact- someone whom you trust who will be there for you.  · Call your local CRISIS HOTLINE 818-6014 or 281-079-9712  · Call your local Children's Mobile Crisis Response Team [place] (555) 078-8363 or www.CityHour  · Call the toll free National Suicide Prevention Hotlines   · National Suicide Prevention Lifeline 173-303-FKXR (4958)  · National Hope Line Network 800-SUICIDE (624-7637)

## 2019-01-06 NOTE — PROGRESS NOTES
Received report from day shift nurse. Assumed patient's cares Pt is up in bed. Pt c/o pain on both shoulders at 8/10. Just medicated for it by day shift RN, no other complains or needs at this time. Encouraged to call for assistance if needed. Bed alarm is on.

## 2019-01-06 NOTE — PROGRESS NOTES
Nursing care plan includes knowledge deficit, potential for discomfort, potential for compromised cardiac output. POC includes teaching, comfort measures and reassurance, and access to code cart, cardiology stand by and availability of rapid response team. Pt verbalizes good understanding of benefits and risks of pharmacological cardiac stress test. Informed consent obtained. Lexiscan given, pt developed the following symptom-SOB. VS stable. To waiting room, caffeinated fluids and/or snacks given. Nursing goals met.

## 2019-01-06 NOTE — PROGRESS NOTES
Telemetry summary:  Rhythm: SR, SB     HR: 50s to 70s    VT: 0.20   QRS 0.12   QT 0.44   Ectopies: Frequent PVC; rare bigeminy, trigeminy, couplets, and triplets.

## 2019-01-06 NOTE — PROGRESS NOTES
"Hospital Medicine Daily Progress Note    Date of Service  1/6/2019    Chief Complaint  Itching, rash    Hospital Course    *Loquacious 68-year-old male with remote history of severe agent orange exposure, subsequently developed polycythemia vera complicated by TIA, coronary artery disease, diabetes.  At one point was on 1500 mg of Hydrea daily, titrated down to 1000 recently.  Sugars suddenly decreased during the month of December, and over the last few days he is developed worsening erythema, itching, scabs due to his polycythemia.  The rash was so severe it was actually painful*      Interval Problem Update  troponins negative and Stress test without evidence of ischemia. EF consistent with echo from august  Potassium repleted    ***  Patient is dramatically different today laying on the bed saying he is too exhausted to move  Getting up to the bathroom to urinate was absolutely exhausting for him.  He feels very short of breath.  He has had intermittent precordial chest pressure, he feels dizzy and weak.  He feels short of breath and is having blurred vision.  He \" does not know what is wrong with him.\"  Patient states he has had extensive cardiac workup in the last year including stress test- I can I can find no record of the stress test, discussed with Dr. Taras Ramirez.  Recommends ruling out with troponins and proceeding with stress testing, agrees with echo.  EKG tracing removed, he has left bundle branch block and associated ST changes, nondiagnostic for ischemia.  Wife at bedside  Discussed with RN      Consultants/Specialty  None  Code Status  Full    Disposition  Home    Review of Systems  Review of Systems   Constitutional: Positive for malaise/fatigue (severe). Negative for diaphoresis and weight loss.   HENT: Negative for sinus pain and sore throat.    Eyes: Positive for blurred vision. Negative for discharge and redness.   Respiratory: Positive for shortness of breath (Severe with minimal exertion " even at rest). Negative for cough (Chronic dry), hemoptysis and sputum production.    Cardiovascular: Positive for chest pain (Pressure precordial).   Gastrointestinal: Negative for abdominal pain, diarrhea, nausea and vomiting.   Genitourinary: Negative for dysuria.   Musculoskeletal: Positive for joint pain.   Skin: Positive for itching (Improved) and rash (States itching remains improved).   Neurological: Positive for dizziness, weakness (severe generalized exhaustion) and headaches. Negative for focal weakness.   Psychiatric/Behavioral: The patient is nervous/anxious.         Physical Exam  Temp:  [36.2 °C (97.1 °F)-36.8 °C (98.3 °F)] 36.5 °C (97.7 °F)  Pulse:  [61-75] 70  Resp:  [18] 18  BP: (146-163)/(75-95) 151/81    Physical Exam   Constitutional: He is oriented to person, place, and time. He appears well-developed and well-nourished. He appears distressed.   HENT:   Head: Normocephalic and atraumatic.   Right Ear: External ear normal.   Left Ear: External ear normal.   Mouth/Throat: Oropharynx is clear and moist.   Eyes: Pupils are equal, round, and reactive to light. Conjunctivae and EOM are normal. Right eye exhibits no discharge. Left eye exhibits no discharge. No scleral icterus.   Neck: Neck supple.   Cardiovascular: Normal rate and regular rhythm.    distant   Pulmonary/Chest: Effort normal.   Decreased but clear   Abdominal: Soft. He exhibits no distension. There is no tenderness.   Decreased BS   Musculoskeletal: He exhibits no edema or tenderness.   Neurological: He is alert and oriented to person, place, and time. No cranial nerve deficit.   No focal signs   Skin: Skin is warm and dry. He is not diaphoretic.   Patchy dermatitis minimal erythema   Psychiatric:   Anxious   Nursing note and vitals reviewed.      Fluids    Intake/Output Summary (Last 24 hours) at 01/06/19 1114  Last data filed at 01/06/19 0000   Gross per 24 hour   Intake             2080 ml   Output              300 ml   Net              1780 ml       Laboratory  Recent Labs      01/04/19   0232  01/06/19   0412   WBC  3.9*  8.5   RBC  3.70*  3.92*   HEMOGLOBIN  13.2*  14.1   HEMATOCRIT  39.2*  42.1   MCV  105.9*  107.4*   MCH  35.7*  36.0*   MCHC  33.7  33.5*   RDW  72.0*  73.2*   PLATELETCT  131*  130*   MPV  11.7  10.8     Recent Labs      01/04/19   0232  01/06/19   0412   SODIUM  133*  132*   POTASSIUM  3.7  3.3*   CHLORIDE  108  101   CO2  18*  24   GLUCOSE  321*  314*   BUN  23*  23*   CREATININE  0.90  0.90   CALCIUM  8.3*  8.8                   Imaging  NM-CARDIAC STRESS TEST   Final Result      CT-CTA CHEST PULMONARY ARTERY W/ RECONS   Final Result      1.  There is no CT evidence of acute pulmonary embolism.   2.  The heart is mildly enlarged with a small amount of pericardial effusion again seen without change.   3.  There is atherosclerosis with coronary artery calcification.   4.  There is no evidence of pneumonia or pleural effusion.   5.  Cystic lesion emanating from the pancreatic tail is unchanged.   6.  Peripherally calcified left lobe thyroid nodule is unchanged.            DX-CHEST-PORTABLE (1 VIEW)   Final Result      Borderline cardiomegaly.      EC-ECHOCARDIOGRAM COMPLETE W/O CONT    (Results Pending)        Assessment/Plan  * Erythromelalgia (HCC)   Assessment & Plan    Symptoms improved on steroids, beginning to taper steroids     CAD (coronary artery disease)- (present on admission)   Assessment & Plan    Today complains of severe fatigue and exhaustion dyspnea on exertion and precordial chest pain.  Echo, serial troponins, d-dimer, stress test have been ordered.  -EKG tracing reviewed shows left bundle branch block.  Patient says he has had stress test within the last year but I reviewed the records and could not find this, Dr. Taras Ramirez also reviewed and could not find evidence of this.     Type 2 diabetes mellitus with complication, without long-term current use of insulin (HCC)- (present on admission)    Assessment & Plan    Sugars severely elevated on steroids probably  The cause of blurred vision  Titrating insulin  Cutting back steroids       Paroxysmal atrial fibrillation (HCC)- (present on admission)   Assessment & Plan    Rate controlled, continue Eliquis       BPH (benign prostatic hyperplasia)- (present on admission)   Assessment & Plan    On Proscar, Flomax     Precordial chest pain   Assessment & Plan    EKG unremarkable 2/2 LBBB plan as noted     Dyspnea   Assessment & Plan    Check d-dimer, eval cardiac issues     Generalized weakness   Assessment & Plan    Unclear cause-eval for cardiac issues  Vitals are stable he has no focal deficits     Polycythemia vera (HCC)   Assessment & Plan    Hx of , on hydurea, follows with hematology.  Counts overall stable, CTM.     Elevated lactic acid level- (present on admission)   Assessment & Plan    Variable, unclear cause     Agent orange exposure- (present on admission)   Assessment & Plan    Patient attributes a lot of his health issues to this          VTE prophylaxis: Eliquis

## 2019-01-06 NOTE — PROGRESS NOTES
"Hospital Medicine Daily Progress Note    Date of Service  1/5/2019    Chief Complaint  Itching, rash    Hospital Course    *Loquacious 68-year-old male with remote history of severe agent orange exposure, subsequently developed polycythemia vera complicated by TIA, coronary artery disease, diabetes.  At one point was on 1500 mg of Hydrea daily, titrated down to 1000 recently.  Sugars suddenly decreased during the month of December, and over the last few days he is developed worsening erythema, itching, scabs due to his polycythemia.  The rash was so severe it was actually painful*      Interval Problem Update  Patient is dramatically different today laying on the bed saying he is too exhausted to move  Getting up to the bathroom to urinate was absolutely exhausting for him.  He feels very short of breath.  He has had intermittent precordial chest pressure, he feels dizzy and weak.  He feels short of breath and is having blurred vision.  He \" does not know what is wrong with him.\"  Patient states he has had extensive cardiac workup in the last year including stress test- I can I can find no record of the stress test, discussed with Dr. Taras Ramirez.  Recommends ruling out with troponins and proceeding with stress testing, agrees with echo.  EKG tracing removed, he has left bundle branch block and associated ST changes, nondiagnostic for ischemia.  Wife at bedside  Discussed with RN      Consultants/Specialty  None  Code Status  Full    Disposition  Home    Review of Systems  Review of Systems   Constitutional: Positive for malaise/fatigue (severe). Negative for diaphoresis and weight loss.   HENT: Negative for sinus pain and sore throat.    Eyes: Positive for blurred vision. Negative for discharge and redness.   Respiratory: Positive for shortness of breath (Severe with minimal exertion even at rest). Negative for cough (Chronic dry), hemoptysis and sputum production.    Cardiovascular: Positive for chest pain " (Pressure precordial).   Gastrointestinal: Negative for abdominal pain, diarrhea, nausea and vomiting.   Genitourinary: Negative for dysuria.   Musculoskeletal: Positive for joint pain.   Skin: Positive for itching (Improved) and rash (States itching remains improved).   Neurological: Positive for dizziness, weakness (severe generalized exhaustion) and headaches. Negative for focal weakness.   Psychiatric/Behavioral: The patient is nervous/anxious.         Physical Exam  Temp:  [36.2 °C (97.1 °F)-36.6 °C (97.8 °F)] 36.6 °C (97.8 °F)  Pulse:  [62-78] 63  Resp:  [16-18] 18  BP: (128-172)/() 163/95    Physical Exam   Constitutional: He is oriented to person, place, and time. He appears well-developed and well-nourished. He appears distressed.   HENT:   Head: Normocephalic and atraumatic.   Right Ear: External ear normal.   Left Ear: External ear normal.   Mouth/Throat: Oropharynx is clear and moist.   Eyes: Pupils are equal, round, and reactive to light. Conjunctivae and EOM are normal. Right eye exhibits no discharge. Left eye exhibits no discharge. No scleral icterus.   Neck: Neck supple.   Cardiovascular: Normal rate and regular rhythm.    distant   Pulmonary/Chest: Effort normal.   Decreased but clear   Abdominal: Soft. He exhibits no distension. There is no tenderness.   Decreased BS   Musculoskeletal: He exhibits no edema or tenderness.   Neurological: He is alert and oriented to person, place, and time. No cranial nerve deficit.   No focal signs   Skin: Skin is warm and dry. He is not diaphoretic.   Patchy dermatitis minimal erythema   Psychiatric:   Anxious   Nursing note and vitals reviewed.      Fluids    Intake/Output Summary (Last 24 hours) at 01/05/19 1641  Last data filed at 01/05/19 1200   Gross per 24 hour   Intake             1160 ml   Output             1000 ml   Net              160 ml       Laboratory  Recent Labs      01/03/19   0936  01/04/19   0232   WBC  3.9*  3.9*   RBC  4.11*  3.70*    HEMOGLOBIN  14.7  13.2*   HEMATOCRIT  43.9  39.2*   MCV  106.8*  105.9*   MCH  35.8*  35.7*   MCHC  33.5*  33.7   RDW  73.8*  72.0*   PLATELETCT  138*  131*   MPV  11.9  11.7     Recent Labs      01/03/19   0936  01/04/19   0232   SODIUM  134*  133*   POTASSIUM  3.6  3.7   CHLORIDE  104  108   CO2  19*  18*   GLUCOSE  198*  321*   BUN  18  23*   CREATININE  1.08  0.90   CALCIUM  9.1  8.3*     Recent Labs      01/03/19   0936   APTT  30.0   INR  1.44*     Recent Labs      01/03/19   0936   BNPBTYPENAT  371*     Recent Labs      01/03/19   0936   TRIGLYCERIDE  122   HDL  32*   LDL  102*       Imaging  DX-CHEST-PORTABLE (1 VIEW)   Final Result      Borderline cardiomegaly.      EC-ECHOCARDIOGRAM COMPLETE W/O CONT    (Results Pending)        Assessment/Plan  * Erythromelalgia (HCC)   Assessment & Plan    Symptoms improved on steroids, beginning to taper steroids     CAD (coronary artery disease)- (present on admission)   Assessment & Plan    Today complains of severe fatigue and exhaustion dyspnea on exertion and precordial chest pain.  Echo, serial troponins, d-dimer, stress test have been ordered.  -EKG tracing reviewed shows left bundle branch block.  Patient says he has had stress test within the last year but I reviewed the records and could not find this, Dr. Taras Ramirez also reviewed and could not find evidence of this.     Type 2 diabetes mellitus with complication, without long-term current use of insulin (HCC)- (present on admission)   Assessment & Plan    Sugars severely elevated on steroids probably  The cause of blurred vision  Titrating insulin  Cutting back steroids       Paroxysmal atrial fibrillation (HCC)- (present on admission)   Assessment & Plan    Rate controlled, continue Eliquis       BPH (benign prostatic hyperplasia)- (present on admission)   Assessment & Plan    On Proscar, Flomax     Precordial chest pain   Assessment & Plan    EKG unremarkable 2/2 LBBB plan as noted     Dyspnea    Assessment & Plan    Check d-dimer, eval cardiac issues     Generalized weakness   Assessment & Plan    Unclear cause-eval for cardiac issues  Vitals are stable he has no focal deficits     Polycythemia vera (HCC)   Assessment & Plan    Hx of , on hydurea, follows with hematology.  Counts overall stable, CTM.     Elevated lactic acid level- (present on admission)   Assessment & Plan    Variable, unclear cause     Agent orange exposure- (present on admission)   Assessment & Plan    Patient attributes a lot of his health issues to this          VTE prophylaxis: Eliquis

## 2019-01-07 ENCOUNTER — PATIENT OUTREACH (OUTPATIENT)
Dept: HEALTH INFORMATION MANAGEMENT | Facility: OTHER | Age: 69
End: 2019-01-07

## 2019-01-08 LAB
BACTERIA BLD CULT: NORMAL
BACTERIA BLD CULT: NORMAL
SIGNIFICANT IND 70042: NORMAL
SIGNIFICANT IND 70042: NORMAL
SITE SITE: NORMAL
SITE SITE: NORMAL
SOURCE SOURCE: NORMAL
SOURCE SOURCE: NORMAL

## 2019-01-09 ENCOUNTER — PATIENT MESSAGE (OUTPATIENT)
Dept: MEDICAL GROUP | Facility: MEDICAL CENTER | Age: 69
End: 2019-01-09

## 2019-01-09 ENCOUNTER — PATIENT OUTREACH (OUTPATIENT)
Dept: HEALTH INFORMATION MANAGEMENT | Facility: OTHER | Age: 69
End: 2019-01-09

## 2019-01-09 NOTE — PROGRESS NOTES
"01/09/2019 1324 - Discharge Outreach - received VM from patient - returned call. States he is having \"dangerous reaction to Gabapentin.\". States he had same reaction in past. Wanting to have dose adjusted or changed to something else. Advised patient to call his PCP. Support given. No further questions.   "

## 2019-01-10 NOTE — TELEPHONE ENCOUNTER
From: Francesco Schulte  To: ALVINO Harman  Sent: 1/9/2019 1:26 PM PST  Subject: Non-Urgent Medical Question    Spent four days in the hospital for the rash complications was released Sunday. I need someone to immediately get back to me at [phone] upon discharge they prescribed gabapentin and I have had problems with that in the past and I am having severe interaction problems with it now and it is unclear how long to take the med but I cannot take the dosages are talking about it is causing major issues. Please assign me a doctor while you're on vacation to come in and see immediately thank you

## 2019-01-10 NOTE — PATIENT COMMUNICATION
Pt is intolerant of Gabapentin due to side effects, therefore he will not take the gabapentin 400 mg 3 times daily that was recommended from the hospital.  He is not having neuropathic pain.  He is able to tolerate putting on his medications.  He will complete his steroids.

## 2019-01-11 ENCOUNTER — PATIENT MESSAGE (OUTPATIENT)
Dept: MEDICAL GROUP | Facility: MEDICAL CENTER | Age: 69
End: 2019-01-11

## 2019-01-25 ENCOUNTER — PATIENT MESSAGE (OUTPATIENT)
Dept: MEDICAL GROUP | Facility: MEDICAL CENTER | Age: 69
End: 2019-01-25

## 2019-01-25 RX ORDER — PREDNISONE 20 MG/1
20 TABLET ORAL DAILY
Qty: 14 TAB | Refills: 0 | Status: SHIPPED | OUTPATIENT
Start: 2019-01-25 | End: 2019-02-11 | Stop reason: SDUPTHER

## 2019-01-25 NOTE — TELEPHONE ENCOUNTER
From: Francesco Schulte  To: ALVINO Harman  Sent: 1/25/2019 8:18 AM PST  Subject: RE: Non-Urgent Medical Question    Rash starting back. Should go back to low dose... 20 mg Prednisone per day to hold down till Feb 1 [place]? ASAP     ----- Message -----  From: ALVINO Harman  Sent: 1/15/19, 10:35 AM  To: Bob Schulte  Subject: RE: Non-Urgent Medical Question    Hi Bob,  I did review your hospitalization notes and discharge report. It does not seem to indicate that they had determined any cause? But it sounds like you had improved with the larger dose of steroid, which is good. I believe the [place] providers will have access to your records in our system as we are partner with [place].  Cheyenne GOLDEN      ----- Message -----   From: Bob Schulte   Sent: 1/11/2019 10:31 AM PST   To: ALVINO Harman  Subject: Non-Urgent Medical Question    I hope your vacation time went well. As you may be aware I was taken into emergency for the rash and put upstairs in the hospital for four days they did not know what to do and did a Hail Em[place] of advance steroids and anabiotic's with nine IV bags. The situation was quite serious including as you see heart functions and such to try to figure out what was going on with the reaction. I ask that you take the file from December One through to date and forward it to the [place] oncologist that will see me on February 1 that you should have a record of in addition CC Dr. Sims here so that he can keep track of those records to make sure they are in [place]'s position prior to the February 1 all day meeting. I have still not healed however it is in a remissive state however still serious. And I am at home I would suggest if possible that we update by the end of the week and I will also have a CBC done with Dr. marquez to try to get a handle on what is happened. Thank you for your help

## 2019-01-25 NOTE — TELEPHONE ENCOUNTER
From: Francesco Schulte  To: ALVINO Harman  Sent: 1/25/2019 8:26 AM PST  Subject: RE: Non-Urgent Medical Question    yes please send to [facility]. We may have to go up to 40 MG per day however I don't want to start there it's really hard on my body. We just need to hold this in check for a couple weeks. Thank you again for the quick response    ----- Message -----  From: ALVINO Harman  Sent: 1/25/19, 8:23 AM  To: Bob Schulte  Subject: RE: Non-Urgent Medical Question    That's fine. Do you need a new prescription?    ----- Message -----   From: Bob Schulte   Sent: 1/25/2019 8:18 AM PST   To: ALVINO Harman  Subject: RE: Non-Urgent Medical Question    Rash starting back. Should go back to low dose... 20 mg Prednisone per day to hold down till Feb 1 [place]? ASAP     ----- Message -----  From: ALVINO Harman  Sent: 1/15/19, 10:35 AM  To: Bob Schulte  Subject: RE: Non-Urgent Medical Question    José Miguel Rojas,  I did review your hospitalization notes and discharge report. It does not seem to indicate that they had determined any cause? But it sounds like you had improved with the larger dose of steroid, which is good. I believe the [place] providers will have access to your records in our system as we are partner with [place].  Cheyenne GOLDEN      ----- Message -----   From: Bob Schulte   Sent: 1/11/2019 10:31 AM PST   To: ALVINO Harman  Subject: Non-Urgent Medical Question    I hope your vacation time went well. As you may be aware I was taken into emergency for the rash and put upstairs in the hospital for four days they did not know what to do and did a Hail Emilee of advance steroids and anabiotic's with nine IV bags. The situation was quite serious including as you see heart functions and such to try to figure out what was going on with the reaction. I ask that you take the file from December One through to date and forward it to the [place]  oncologist that will see me on February 1 that you should have a record of in addition CC Dr. Sims here so that he can keep track of those records to make sure they are in [place]'s position prior to the February 1 all day meeting. I have still not healed however it is in a remissive state however still serious. And I am at home I would suggest if possible that we update by the end of the week and I will also have a CBC done with Dr. marquez to try to get a handle on what is happened. Thank you for your help

## 2019-02-06 ENCOUNTER — PATIENT OUTREACH (OUTPATIENT)
Dept: HEALTH INFORMATION MANAGEMENT | Facility: OTHER | Age: 69
End: 2019-02-06

## 2019-02-08 ENCOUNTER — APPOINTMENT (OUTPATIENT)
Dept: RADIOLOGY | Facility: MEDICAL CENTER | Age: 69
End: 2019-02-08
Attending: EMERGENCY MEDICINE
Payer: MEDICARE

## 2019-02-08 ENCOUNTER — HOSPITAL ENCOUNTER (EMERGENCY)
Facility: MEDICAL CENTER | Age: 69
End: 2019-02-08
Attending: EMERGENCY MEDICINE
Payer: MEDICARE

## 2019-02-08 VITALS
RESPIRATION RATE: 18 BRPM | OXYGEN SATURATION: 98 % | BODY MASS INDEX: 28.12 KG/M2 | SYSTOLIC BLOOD PRESSURE: 136 MMHG | DIASTOLIC BLOOD PRESSURE: 97 MMHG | HEART RATE: 77 BPM | WEIGHT: 226.19 LBS | HEIGHT: 75 IN

## 2019-02-08 DIAGNOSIS — S93.402A SPRAIN OF LEFT ANKLE, UNSPECIFIED LIGAMENT, INITIAL ENCOUNTER: ICD-10-CM

## 2019-02-08 LAB
ALBUMIN SERPL BCP-MCNC: 3.8 G/DL (ref 3.2–4.9)
ALBUMIN/GLOB SERPL: 1.2 G/DL
ALP SERPL-CCNC: 50 U/L (ref 30–99)
ALT SERPL-CCNC: 25 U/L (ref 2–50)
ANION GAP SERPL CALC-SCNC: 11 MMOL/L (ref 0–11.9)
APTT PPP: 30.3 SEC (ref 24.7–36)
AST SERPL-CCNC: 25 U/L (ref 12–45)
BASOPHILS # BLD AUTO: 0.8 % (ref 0–1.8)
BASOPHILS # BLD: 0.04 K/UL (ref 0–0.12)
BILIRUB SERPL-MCNC: 0.9 MG/DL (ref 0.1–1.5)
BUN SERPL-MCNC: 17 MG/DL (ref 8–22)
CALCIUM SERPL-MCNC: 8.7 MG/DL (ref 8.4–10.2)
CHLORIDE SERPL-SCNC: 104 MMOL/L (ref 96–112)
CO2 SERPL-SCNC: 20 MMOL/L (ref 20–33)
CREAT SERPL-MCNC: 0.82 MG/DL (ref 0.5–1.4)
EKG IMPRESSION: NORMAL
EOSINOPHIL # BLD AUTO: 0 K/UL (ref 0–0.51)
EOSINOPHIL NFR BLD: 0 % (ref 0–6.9)
ERYTHROCYTE [DISTWIDTH] IN BLOOD BY AUTOMATED COUNT: 58.4 FL (ref 35.9–50)
GLOBULIN SER CALC-MCNC: 3.1 G/DL (ref 1.9–3.5)
GLUCOSE SERPL-MCNC: 233 MG/DL (ref 65–99)
HCT VFR BLD AUTO: 41.1 % (ref 42–52)
HGB BLD-MCNC: 13.8 G/DL (ref 14–18)
IMM GRANULOCYTES # BLD AUTO: 0.05 K/UL (ref 0–0.11)
IMM GRANULOCYTES NFR BLD AUTO: 1 % (ref 0–0.9)
INR PPP: 1.44 (ref 0.87–1.13)
LYMPHOCYTES # BLD AUTO: 0.28 K/UL (ref 1–4.8)
LYMPHOCYTES NFR BLD: 5.8 % (ref 22–41)
MCH RBC QN AUTO: 34.6 PG (ref 27–33)
MCHC RBC AUTO-ENTMCNC: 33.6 G/DL (ref 33.7–35.3)
MCV RBC AUTO: 103 FL (ref 81.4–97.8)
MONOCYTES # BLD AUTO: 0.22 K/UL (ref 0–0.85)
MONOCYTES NFR BLD AUTO: 4.6 % (ref 0–13.4)
NEUTROPHILS # BLD AUTO: 4.22 K/UL (ref 1.82–7.42)
NEUTROPHILS NFR BLD: 87.8 % (ref 44–72)
NRBC # BLD AUTO: 0 K/UL
NRBC BLD-RTO: 0 /100 WBC
PLATELET # BLD AUTO: 254 K/UL (ref 164–446)
PMV BLD AUTO: 10.9 FL (ref 9–12.9)
POTASSIUM SERPL-SCNC: 3.7 MMOL/L (ref 3.6–5.5)
PROT SERPL-MCNC: 6.9 G/DL (ref 6–8.2)
PROTHROMBIN TIME: 17.4 SEC (ref 12–14.6)
RBC # BLD AUTO: 3.99 M/UL (ref 4.7–6.1)
SODIUM SERPL-SCNC: 135 MMOL/L (ref 135–145)
TROPONIN I SERPL-MCNC: 0.02 NG/ML (ref 0–0.04)
WBC # BLD AUTO: 4.8 K/UL (ref 4.8–10.8)

## 2019-02-08 PROCEDURE — 99284 EMERGENCY DEPT VISIT MOD MDM: CPT

## 2019-02-08 PROCEDURE — 73610 X-RAY EXAM OF ANKLE: CPT | Mod: LT

## 2019-02-08 PROCEDURE — 85730 THROMBOPLASTIN TIME PARTIAL: CPT

## 2019-02-08 PROCEDURE — 36415 COLL VENOUS BLD VENIPUNCTURE: CPT

## 2019-02-08 PROCEDURE — 80053 COMPREHEN METABOLIC PANEL: CPT

## 2019-02-08 PROCEDURE — 85610 PROTHROMBIN TIME: CPT

## 2019-02-08 PROCEDURE — 85025 COMPLETE CBC W/AUTO DIFF WBC: CPT

## 2019-02-08 PROCEDURE — 93005 ELECTROCARDIOGRAM TRACING: CPT

## 2019-02-08 PROCEDURE — 84484 ASSAY OF TROPONIN QUANT: CPT

## 2019-02-08 RX ORDER — INSULIN GLARGINE 100 [IU]/ML
10 INJECTION, SOLUTION SUBCUTANEOUS
Status: SHIPPED | COMMUNITY
Start: 2017-01-25 | End: 2019-05-06

## 2019-02-08 NOTE — ED TRIAGE NOTES
Chief Complaint   Patient presents with   • Ankle Pain     pt reports sudden onset of ankle pain, denies trauma   • Abnormal EKG     seen at [facility] on Friday. pt reports concern for abnormal EKG     Patient denies chest pain. EKG completed in triage

## 2019-02-08 NOTE — ED NOTES
Crutches provided per ERP orders. Discharge instructions provided.  Pt verbalized the understanding of discharge instructions to follow up with Ortho, PCP and to return to ER if condition worsens.

## 2019-02-08 NOTE — ED PROVIDER NOTES
ED Provider Note    CHIEF COMPLAINT  Chief Complaint   Patient presents with   • Ankle Pain     pt reports sudden onset of ankle pain, denies trauma   • Abnormal EKG     seen at [place] on Friday. pt reports concern for abnormal EKG       HPI  Francesco Schulte is a 68 y.o. male who presents with left ankle pain.  The patient states that yesterday he was walking when he felt a pop in his left ankle.  Subsequently he could not apply weight to the ankle without significant pain.  Today's he states he can tolerate some weight but still has severe pain to the posterior and lateral aspect of the left ankle.  The patient is unaware of any direct trauma.  The patient states that he was in [place] recently for workup of his polycythemia and the patient was found to be in atrial fibrillation.  He is instructed to follow-up with his cardiologist when he returns to [place].  He states he has not heard back from [facility] cardiology over the last 3 days.  He has not any associated chest pain or palpitations.  The patient does currently take anticoagulation secondary to his risk of thrombosis with his polycythemia.     REVIEW OF SYSTEMS  See HPI for further details. All other systems are negative.     PAST MEDICAL HISTORY  Past Medical History:   Diagnosis Date   • Orthostatic hypotension 3/29/2018   • Heart attack (HCC) 03/2017   • Stroke (HCC) 2016    r/t afib; eye issues resolved afterward   • Family history of punctured lung 2002    left lung   • Anesthesia     resistant to pain meds   • Cancer (HCC)     polycythemia vera   • Diabetes (HCC)     insulin and oral meds   • Hemorrhagic disorder (HCC)     on blood thinners   • High cholesterol    • Ischemic cardiomyopathy    • Kidney stones     hx of kidney stones   • Pain     headache pain   • Polycythemia vera(238.4)    • Urinary bladder disorder     enlarged prostate, weak stream, difficulty urinating   • Vertigo        FAMILY HISTORY  [unfilled]    SOCIAL HISTORY  Social  "History     Social History   • Marital status:      Spouse name: N/A   • Number of children: N/A   • Years of education: N/A     Social History Main Topics   • Smoking status: Never Smoker   • Smokeless tobacco: Never Used   • Alcohol use No   • Drug use: No   • Sexual activity: Not on file     Other Topics Concern   • Not on file     Social History Narrative   • No narrative on file       SURGICAL HISTORY  Past Surgical History:   Procedure Laterality Date   • TEMPORAL ARTERY BIOPSY Right 6/26/2018    Procedure: TEMPORAL ARTERY BIOPSY;  Surgeon: Rajendra Aguilar M.D.;  Location: SURGERY SAME DAY [facility];  Service: General   • CAROTID ENDARTERECTOMY Right 3/21/2018    Procedure: CAROTID ENDARTERECTOMY- W/EEG MONITORING;  Surgeon: Benny Morfin M.D.;  Location: SURGERY [facility];  Service: General   • APPENDECTOMY     • CATH PLACEMENT      right arm   • LAMINOTOMY      diskectomy; C4   • OTHER CARDIAC SURGERY      stents x 3       CURRENT MEDICATIONS  Home Medications    **Home medications have not yet been reviewed for this encounter**         ALLERGIES  Allergies   Allergen Reactions   • Beta Adrenergic Blockers Unspecified     dizziness   • Metformin Unspecified     \"Similar to a heart attack, I was hospitalized\"   • Simvastatin      Myalgias   • Statins [Hmg-Coa-R Inhibitors]      Muscle ache, joint pain       PHYSICAL EXAM  VITAL SIGNS: /97   Pulse 89   Resp 18   Ht 1.905 m (6' 3\")   BMI 28.11 kg/m²  Room air O2: 99    Constitutional: Well developed, Well nourished, No acute distress, Non-toxic appearance.   HENT: Normocephalic, Atraumatic, Bilateral external ears normal, Oropharynx moist, No oral exudates, Nose normal.   Eyes: PERRLA, EOMI, Conjunctiva normal, No discharge.   Neck: Normal range of motion, No tenderness, Supple, No stridor.   Lymphatic: No lymphadenopathy noted.   Cardiovascular: Normal heart rate, irregular rhythm, No murmurs, No rubs, No gallops.   Thorax & Lungs: " Normal breath sounds, No respiratory distress, No wheezing, No chest tenderness.   Abdomen: Bowel sounds normal, Soft, No tenderness, No masses, No pulsatile masses.   Skin: Warm, Dry, No erythema, No rash.   Back: No tenderness, No CVA tenderness.   Extremities: Intact distal pulses, No edema, diffuse tenderness throughout the left ankle, No cyanosis, No clubbing.   Neurologic: Alert & oriented x 3, Normal motor function, Normal sensory function, No focal deficits noted.   Psychiatric: Affect normal, Judgment normal, Mood normal.     EKG interpretation by myself  Normal sinus rhythm with ventricular rate 81, the patient does have premature ventricular contractions, otherwise normal intervals, left ventricular hypertrophy with poor R wave progression, no ST segment elevation or depression, T wave inversion in V4 and V5 that is acute with stable T wave inversion in 1 and aVL    RADIOLOGY/PROCEDURES  DX-ANKLE 3+ VIEWS LEFT   Final Result      1.  No evidence of acute fracture or dislocation.      2.  Mild degenerative change of the tibiotalar joint.            COURSE & MEDICAL DECISION MAKING  Pertinent Labs & Imaging studies reviewed. (See chart for details)  This a 68-year-old male who presents with left ankle pain.  The x-ray does not support a fracture nor dislocation.  He states he has significant pain with ambulation I suspect this is may be more from a sprain.  The patient states that he does have a walking boot at home.  The patient will be discharged home with crutches with instructions to weight-bear as tolerated.  If the patient is not significantly better in 4-5 days he will follow-up with orthopedics.  Clinically do not appreciate any evidence of an infection.  As for the EKG this was performed in triage via protocol.  The patient does have some T wave inversions that is acute.  However he has not any chest pain or difficulty breathing.  The patient had laboratory analysis that shows a normal troponin.  I  spoke with cardiologist and they will get the patient in the office next week for repeat examination for his atrial fibrillation that sounds paroxysmal in nature.  The patient's been asymptomatic from a chest pain standpoint throughout his stay in the emergency department.  He will be discharged home in stable condition.  FINAL IMPRESSION  1.  Left ankle pain  2.  Recent history of atrial fibrillation   3.  Iatrogenic coagulopathy         Electronically signed by: Jalil Khan, 2/8/2019 10:50 AM

## 2019-02-08 NOTE — ED NOTES
Med Rec completed per patient  Allergies reviewed  No ORAL antibiotics in last 30 days    Patient stated he has about 3-4 days left of his Prednisone course

## 2019-02-09 ENCOUNTER — PATIENT MESSAGE (OUTPATIENT)
Dept: MEDICAL GROUP | Facility: MEDICAL CENTER | Age: 69
End: 2019-02-09

## 2019-02-11 ENCOUNTER — TELEPHONE (OUTPATIENT)
Dept: CARDIOLOGY | Facility: MEDICAL CENTER | Age: 69
End: 2019-02-11

## 2019-02-11 RX ORDER — PREDNISONE 20 MG/1
20 TABLET ORAL DAILY
Qty: 14 TAB | Refills: 0 | Status: SHIPPED | OUTPATIENT
Start: 2019-02-11 | End: 2019-02-25

## 2019-02-11 NOTE — TELEPHONE ENCOUNTER
----- Message from Saravanan Vergara, Med Ass't sent at 2/11/2019  9:31 AM PST -----  Regarding: Afib  Contact: [phone]  VANCE Laguerre,    Pt was seen at [place] and was told he was in Afib. He was also in ER and was told he needed to be seen right away. He says he has no pain but does have dizziness. He did schedule appt w/Dr. Song on 02/14. However he is quite concerned with what is going on with his heart. I did offer sooner appt w/CHANTEL and he refused stating that he has a complicated case. He would like a call back at: [phone].    Thank you so much,    Saravanan

## 2019-02-11 NOTE — TELEPHONE ENCOUNTER
returning your call   Received: Today   Message Contents   Lala Bertrand R.N.   Phone Number: [phone]             VANCE/julita     Pt returning your call, please try again [phone]      ================================  Attempted to call pt again. No answer, voicemail left to call back.

## 2019-02-12 NOTE — PROGRESS NOTES
Francesco Schulte was admitted on 1/3/19 for erythromelalgia, once treated he was discharged home on 1/6/19. [facility] patient advocate confirmed multiple discharge needs including 3 appointments, 1 Oncology appointment, 1 Primary Care Physician appointment, 1 Imaging appointment that were all kept. Patient is also scheduled for 2 future follow up appointments with, Cardiology on 2/14 and 3/14. PPS Screening conducted patient scored at 80%

## 2019-02-14 ENCOUNTER — OFFICE VISIT (OUTPATIENT)
Dept: CARDIOLOGY | Facility: MEDICAL CENTER | Age: 69
End: 2019-02-14
Payer: MEDICARE

## 2019-02-14 VITALS
OXYGEN SATURATION: 94 % | HEART RATE: 55 BPM | SYSTOLIC BLOOD PRESSURE: 130 MMHG | DIASTOLIC BLOOD PRESSURE: 70 MMHG | BODY MASS INDEX: 27.16 KG/M2 | WEIGHT: 223 LBS | HEIGHT: 76 IN

## 2019-02-14 DIAGNOSIS — I25.10 CORONARY ARTERY DISEASE INVOLVING NATIVE CORONARY ARTERY OF NATIVE HEART WITHOUT ANGINA PECTORIS: ICD-10-CM

## 2019-02-14 DIAGNOSIS — Z79.899 ENCOUNTER FOR LONG-TERM (CURRENT) USE OF HIGH-RISK MEDICATION: ICD-10-CM

## 2019-02-14 DIAGNOSIS — E78.5 DYSLIPIDEMIA: ICD-10-CM

## 2019-02-14 DIAGNOSIS — I48.0 PAROXYSMAL ATRIAL FIBRILLATION (HCC): ICD-10-CM

## 2019-02-14 DIAGNOSIS — I95.1 ORTHOSTATIC HYPOTENSION: ICD-10-CM

## 2019-02-14 LAB — EKG IMPRESSION: NORMAL

## 2019-02-14 PROCEDURE — 93000 ELECTROCARDIOGRAM COMPLETE: CPT | Performed by: INTERNAL MEDICINE

## 2019-02-14 PROCEDURE — 99215 OFFICE O/P EST HI 40 MIN: CPT | Performed by: INTERNAL MEDICINE

## 2019-02-14 RX ORDER — TERBINAFINE HYDROCHLORIDE 250 MG/1
TABLET ORAL
Refills: 0 | COMMUNITY
Start: 2019-01-24 | End: 2019-04-25

## 2019-02-14 NOTE — PROGRESS NOTES
Subjective:   Francesco Schulte is a 68-year-old male presenting to clinic for follow-up on coronary disease.    Pertinent history:  Coronary artery disease  2008 - PCI to the LAD, POBA to the diagonal.   2014 - anterior STEMI. POBA to the diagonal. PCI to the proximal circumflex.  Restented again in 2014 after his Plavix was stopped for an injection and patient has recurrent MI.  2016 - recurrent MI. POBA to unknown vessel  2017 - anterior STEMI. Status post PCI to the proximal and mid LAD    Ischemic cardiomyopathy, ejection fraction 40%  Paroxysmal atrial fibrillation, diagnosed 2016. Intolerant to beta blockers  TIA vs. CVA in 2016  Polycythemia vera, essential thrombocytosis  Carotid stenosis status post right CEA in March 2018  Hypertension  Hyperlipidemia, intolerant to statins      In January 2019, patient was admitted to the hospital with chest discomfort at which time his workup was unremarkable.    Since then he has been referred to [place] hematology for management of his polycythemia vera.  He also has splenomegaly and may be undergoing a splenectomy in the future.  While he was at [place] and in the ER recently for an ankle injury he was found to have intermittent atrial fibrillation.  He was advised to follow-up with us.  Patient is concerned about having recurrent atrial fibrillation and worries that he is going to have a stroke because he is in and out of atrial fibrillation.  He has multiple questions today about the above.    Overall he has been feeling well.  Not having any recent blood pressure issues.    Past Medical History:   Diagnosis Date   • Anesthesia     resistant to pain meds   • Cancer (HCC)     polycythemia vera   • Diabetes (HCC)     insulin and oral meds   • Family history of punctured lung 2002    left lung   • Heart attack (HCC) 03/2017   • Hemorrhagic disorder (HCC)     on blood thinners   • High cholesterol    • Ischemic cardiomyopathy    • Kidney stones     hx of kidney stones  "  • Orthostatic hypotension 3/29/2018   • Pain     headache pain   • Polycythemia vera(238.4)    • Stroke (HCC) 2016    r/t afib; eye issues resolved afterward   • Urinary bladder disorder     enlarged prostate, weak stream, difficulty urinating   • Vertigo      Past Surgical History:   Procedure Laterality Date   • TEMPORAL ARTERY BIOPSY Right 6/26/2018    Procedure: TEMPORAL ARTERY BIOPSY;  Surgeon: Rajendra Aguilar M.D.;  Location: SURGERY SAME DAY [facility];  Service: General   • CAROTID ENDARTERECTOMY Right 3/21/2018    Procedure: CAROTID ENDARTERECTOMY- W/EEG MONITORING;  Surgeon: Benny Morfin M.D.;  Location: SURGERY [facility];  Service: General   • APPENDECTOMY     • CATH PLACEMENT      right arm   • LAMINOTOMY      diskectomy; C4   • OTHER CARDIAC SURGERY      stents x 3     No family history on file.     History   Smoking Status   • Never Smoker   Smokeless Tobacco   • Never Used     Allergies   Allergen Reactions   • Beta Adrenergic Blockers Unspecified     dizziness   • Metformin Unspecified and Palpitations     Cardiac effects  \"Similar to a heart attack, I was hospitalized\"   • Simvastatin      Other reaction(s): Other (Specify with Comments)  Myalgias  Myalgias   • Statins [Hmg-Coa-R Inhibitors]      Muscle ache, joint pain     Outpatient Encounter Prescriptions as of 2/14/2019   Medication Sig Dispense Refill   • terbinafine (LAMISIL) 250 MG Tab TAKE 1 TABLET BY MOUTH DAILY FOR 7 DAYS  0   • predniSONE (DELTASONE) 20 MG Tab Take 1 Tab by mouth every day for 14 days. 14 Tab 0   • insulin glargine (LANTUS) 100 UNIT/ML Solution Inject 10 Units as instructed every evening as needed.     • diphenhydrAMINE-ZnAcetate (BENADRYL ITCH) 1-0.1 % Cream Apply thin layer over effected area 1 Tube 0   • apixaban (ELIQUIS) 5mg Tab Take 1 Tab by mouth 2 Times a Day. 180 Tab 3   • aspirin EC (ECOTRIN) 81 MG Tablet Delayed Response Take 1 Tab by mouth every day. 30 Tab    • therapeutic multivitamin-minerals " "(THERAGRAN-M) Tab Take 1 Tab by mouth every day.     • hydroxyurea (HYDREA) 500 MG Cap Take 1,000 mg by mouth every morning.     • tamsulosin (FLOMAX) 0.4 MG capsule Take 0.4 mg by mouth every evening.     • finasteride (PROSCAR) 5 MG Tab Take 5 mg by mouth every day.     • colesevelam (WELCHOL) 625 MG Tab Take 625 mg by mouth 2 times a day, with meals.     • glipiZIDE (GLUCOTROL) 10 MG Tab Take 15 mg by mouth every day.       No facility-administered encounter medications on file as of 2/14/2019.      ROS     Objective:   /70 (BP Location: Left arm, Patient Position: Sitting)   Pulse (!) 55   Ht 1.93 m (6' 4\")   Wt 101.2 kg (223 lb)   SpO2 94%   BMI 27.14 kg/m²     Physical Exam   Echocardiogram performed in September 2017 showed a mildly reduced LV function with an EF of 45%. No significant changes from prior study.     Echocardiogram performed in August 2018 showed a moderately reduced LV systolic function with ejection fraction of about 40%.  No major valvular pathology noted.  No significant changes from prior study.    Myocardial perfusion study performed January 2019 was personally reviewed and per my interpretation showed inferior infarct.  No reversible defects.    Echocardiogram performed January 2019 was personally reviewed and per my interpretation showed mildly reduced LV systolic function with ejection fraction of about 35-40%.  No significant changes from prior study.    Labs performed in February 2019 were reviewed and showed normal creatinine.    Assessment:     1. Paroxysmal atrial fibrillation (HCC)     2. Coronary artery disease involving native coronary artery of native heart without angina pectoris     3. Orthostatic hypotension     4. Dyslipidemia     5. Encounter for long-term (current) use of high-risk medication         Medical Decision Making:  Today's Assessment / Status / Plan:     Patient has known paroxysmal atrial fibrillation.  I have explained to the patient the " progression of atrial fibrillation.  It is not unexpected for him to have days when he has atrial fibrillation and days when he is sinus.  Patient was tearful today as one of his close friends recently had a stroke who had atrial fibrillation and was untreated.  He worries that if he continues to be in A. fib he would be at high risk for stroke as well.    Majority of the time was spent today discussing stroke risk in the setting of atrial fibrillation.  Patients with the same CHADS-Vasc score have a similar risk for stroke whether or not they are in paroxysmal or chronic atrial fibrillation.  Patient appeared to be relieved after hearing this information.      I did discuss rhythm control strategies with him which include ablation versus antiarrhythmics.  In terms of antiarrhythmic therapy, amiodarone is probably the best choice in this patient however given his comorbidities may not be ideal long-term given its side effects.  An ablation can be considered however given his polycythemia vera he is probably at a higher risk for stroke and clot formation.  I have discussed the above in detail with the patient and he would like to hold off on any changes in his medications or procedures at this time.  Overall he feels well and wanted to discuss the above because other physicians told him he should follow-up with us. Should he start experiencing any symptoms or change his mind, he will let us know.    He is also worried about having a splenectomy and worries about his risk for a cardiac event or a stroke with being off of anticoagulation.  I have reassured the patient that if he does in fact need a splenectomy, I will be providing risk stratification and will also discuss with his oncologist, Dr. Sims to determine the best plan for his anticoagulation.    Total 40 minutes face-to-face time spent with patient, with greater than 50% of the total time discussing patient's issues and symptoms as listed above in assessment  and plan, as well as managing coordination of care for future evaluation and treatment. Most of the time was spent discussing atrial fibrillation as detailed above.     Return to clinic in 4 months or earlier if needed.    Thank you for allowing me to participate in the care of this patient. Please do not hesitate to contact me with any questions.    Liliana Song MD  Cardiologist  [facility]    PLEASE NOTE: This dictation was created using voice recognition software.

## 2019-02-14 NOTE — LETTER
[facility]   [address]   [address]  [place] [address]  Phone: [phone]  Fax: [phone]              Francesco Schulte  1950    Encounter Date: 2/14/2019    Liliana Song M.D.          PROGRESS NOTE:  Subjective:   Francesco Schulte is a 68-year-old male presenting to clinic for follow-up on coronary disease.    Pertinent history:  Coronary artery disease  2008 - PCI to the LAD, POBA to the diagonal.   2014 - anterior STEMI. POBA to the diagonal. PCI to the proximal circumflex.  Restented again in 2014 after his Plavix was stopped for an injection and patient has recurrent MI.  2016 - recurrent MI. POBA to unknown vessel  2017 - anterior STEMI. Status post PCI to the proximal and mid LAD    Ischemic cardiomyopathy, ejection fraction 40%  Paroxysmal atrial fibrillation, diagnosed 2016. Intolerant to beta blockers  TIA vs. CVA in 2016  Polycythemia vera, essential thrombocytosis  Carotid stenosis status post right CEA in March 2018  Hypertension  Hyperlipidemia, intolerant to statins      In January 2019, patient was admitted to the hospital with chest discomfort at which time his workup was unremarkable.    Since then he has been referred to [place] [facility] for management of his polycythemia vera.  He also has splenomegaly and may be undergoing a splenectomy in the future.  While he was at [place] and in the ER recently for an ankle injury he was found to have intermittent atrial fibrillation.  He was advised to follow-up with us.  Patient is concerned about having recurrent atrial fibrillation and worries that he is going to have a stroke because he is in and out of atrial fibrillation.  He has multiple questions today about the above.    Overall he has been feeling well.  Not having any recent blood pressure issues.    Past Medical History:   Diagnosis Date   • Anesthesia     resistant to pain meds   • Cancer (HCC)     "polycythemia vera   • Diabetes (HCC)     insulin and oral meds   • Family history of punctured lung 2002    left lung   • Heart attack (HCC) 03/2017   • Hemorrhagic disorder (HCC)     on blood thinners   • High cholesterol    • Ischemic cardiomyopathy    • Kidney stones     hx of kidney stones   • Orthostatic hypotension 3/29/2018   • Pain     headache pain   • Polycythemia vera(238.4)    • Stroke (HCC) 2016    r/t afib; eye issues resolved afterward   • Urinary bladder disorder     enlarged prostate, weak stream, difficulty urinating   • Vertigo      Past Surgical History:   Procedure Laterality Date   • TEMPORAL ARTERY BIOPSY Right 6/26/2018    Procedure: TEMPORAL ARTERY BIOPSY;  Surgeon: Rajendra Aguilar M.D.;  Location: SURGERY SAME DAY [facility] ORS;  Service: General   • CAROTID ENDARTERECTOMY Right 3/21/2018    Procedure: CAROTID ENDARTERECTOMY- W/EEG MONITORING;  Surgeon: Benny Morfin M.D.;  Location: SURGERY [facility] ORS;  Service: General   • APPENDECTOMY     • CATH PLACEMENT      right arm   • LAMINOTOMY      diskectomy; C4   • OTHER CARDIAC SURGERY      stents x 3     No family history on file.     History   Smoking Status   • Never Smoker   Smokeless Tobacco   • Never Used     Allergies   Allergen Reactions   • Beta Adrenergic Blockers Unspecified     dizziness   • Metformin Unspecified and Palpitations     Cardiac effects  \"Similar to a heart attack, I was hospitalized\"   • Simvastatin      Other reaction(s): Other (Specify with Comments)  Myalgias  Myalgias   • Statins [Hmg-Coa-R Inhibitors]      Muscle ache, joint pain     Outpatient Encounter Prescriptions as of 2/14/2019   Medication Sig Dispense Refill   • terbinafine (LAMISIL) 250 MG Tab TAKE 1 TABLET BY MOUTH DAILY FOR 7 DAYS  0   • predniSONE (DELTASONE) 20 MG Tab Take 1 Tab by mouth every day for 14 days. 14 Tab 0   • insulin glargine (LANTUS) 100 UNIT/ML Solution Inject 10 Units as instructed every evening as needed.     • " "diphenhydrAMINE-ZnAcetate (BENADRYL ITCH) 1-0.1 % Cream Apply thin layer over effected area 1 Tube 0   • apixaban (ELIQUIS) 5mg Tab Take 1 Tab by mouth 2 Times a Day. 180 Tab 3   • aspirin EC (ECOTRIN) 81 MG Tablet Delayed Response Take 1 Tab by mouth every day. 30 Tab    • therapeutic multivitamin-minerals (THERAGRAN-M) Tab Take 1 Tab by mouth every day.     • hydroxyurea (HYDREA) 500 MG Cap Take 1,000 mg by mouth every morning.     • tamsulosin (FLOMAX) 0.4 MG capsule Take 0.4 mg by mouth every evening.     • finasteride (PROSCAR) 5 MG Tab Take 5 mg by mouth every day.     • colesevelam (WELCHOL) 625 MG Tab Take 625 mg by mouth 2 times a day, with meals.     • glipiZIDE (GLUCOTROL) 10 MG Tab Take 15 mg by mouth every day.       No facility-administered encounter medications on file as of 2/14/2019.      ROS     Objective:   /70 (BP Location: Left arm, Patient Position: Sitting)   Pulse (!) 55   Ht 1.93 m (6' 4\")   Wt 101.2 kg (223 lb)   SpO2 94%   BMI 27.14 kg/m²      Physical Exam   Echocardiogram performed in September 2017 showed a mildly reduced LV function with an EF of 45%. No significant changes from prior study.     Echocardiogram performed in August 2018 showed a moderately reduced LV systolic function with ejection fraction of about 40%.  No major valvular pathology noted.  No significant changes from prior study.    Myocardial perfusion study performed January 2019 was personally reviewed and per my interpretation showed inferior infarct.  No reversible defects.    Echocardiogram performed January 2019 was personally reviewed and per my interpretation showed mildly reduced LV systolic function with ejection fraction of about 35-40%.  No significant changes from prior study.    Labs performed in February 2019 were reviewed and showed normal creatinine.    Assessment:     1. Paroxysmal atrial fibrillation (HCC)     2. Coronary artery disease involving native coronary artery of native heart " without angina pectoris     3. Orthostatic hypotension     4. Dyslipidemia     5. Encounter for long-term (current) use of high-risk medication         Medical Decision Making:  Today's Assessment / Status / Plan:     Patient has known paroxysmal atrial fibrillation.  I have explained to the patient the progression of atrial fibrillation.  It is not unexpected for him to have days when he has atrial fibrillation and days when he is sinus.  Patient was tearful today as one of his close friends recently had a stroke who had atrial fibrillation and was untreated.  He worries that if he continues to be in A. fib he would be at high risk for stroke as well.    Majority of the time was spent today discussing stroke risk in the setting of atrial fibrillation.  Patients with the same CHADS-Vasc score have a similar risk for stroke whether or not they are in paroxysmal or chronic atrial fibrillation.  Patient appeared to be relieved after hearing this information.      I did discuss rhythm control strategies with him which include ablation versus antiarrhythmics.  In terms of antiarrhythmic therapy, amiodarone is probably the best choice in this patient however given his comorbidities may not be ideal long-term given its side effects.  An ablation can be considered however given his polycythemia vera he is probably at a higher risk for stroke and clot formation.  I have discussed the above in detail with the patient and he would like to hold off on any changes in his medications or procedures at this time.  Overall he feels well and wanted to discuss the above because other physicians told him he should follow-up with us. Should he start experiencing any symptoms or change his mind, he will let us know.    He is also worried about having a splenectomy and worries about his risk for a cardiac event or a stroke with being off of anticoagulation.  I have reassured the patient that if he does in fact need a splenectomy, I will  be providing risk stratification and will also discuss with his oncologist, Dr. Sims to determine the best plan for his anticoagulation.    Total 40 minutes face-to-face time spent with patient, with greater than 50% of the total time discussing patient's issues and symptoms as listed above in assessment and plan, as well as managing coordination of care for future evaluation and treatment. Most of the time was spent discussing atrial fibrillation as detailed above.     Return to clinic in 4 months or earlier if needed.    Thank you for allowing me to participate in the care of this patient. Please do not hesitate to contact me with any questions.    Liliana Song MD  Cardiologist  [facility]    PLEASE NOTE: This dictation was created using voice recognition software.         Nereyda Moore, ATUL.P.R.N.  [address] [facility]  [address]  [facility] [address]  VIA In Basket

## 2019-02-21 ENCOUNTER — PATIENT MESSAGE (OUTPATIENT)
Dept: MEDICAL GROUP | Facility: MEDICAL CENTER | Age: 69
End: 2019-02-21

## 2019-02-21 DIAGNOSIS — R21 SEVERE RASH: ICD-10-CM

## 2019-02-21 NOTE — TELEPHONE ENCOUNTER
From: Francesco Schulte  To: ALVINO Harman  Sent: 2/21/2019 8:05 AM PST  Subject: Non-Urgent Medical Question    The rash is coming back with vengeance I don't want to have to go to ER. Doctor Sims suggest going to a dermatologist immediately while we are changing meds that may be causing this problem, can you please refer me to a dermatologist within the [facility] system please and if possible set up and expedited appointment thank you

## 2019-02-22 ENCOUNTER — PATIENT MESSAGE (OUTPATIENT)
Dept: MEDICAL GROUP | Facility: MEDICAL CENTER | Age: 69
End: 2019-02-22

## 2019-02-22 NOTE — TELEPHONE ENCOUNTER
From: Francesco Schulte  To: ALVINO Harman  Sent: 2/22/2019 7:16 AM PST  Subject: RE: Non-Urgent Medical Question    Please prescribe a two week supply of predisone 20mg so i can try to slow the rash advancement spending appointment. Its spreading to other areas and old areas are now itching and breaking out. Thank you    ----- Message -----  From: ALVINO Harman  Sent: 2/21/19 10:41 AM  To: Bob Schulte  Subject: RE: Non-Urgent Medical Question    I will start an urgent referral. I'm not sure if they will send you through [facility] or an outside dermatologist, we have limited providers with [facility] so you may be able to get into a private office sooner. You should hear back on the referral hopefully by Monday  [place] GOLDEN      ----- Message -----   From: Bob Schulte   Sent: 2/21/2019 8:05 AM PST   To: ALVINO Harman  Subject: Non-Urgent Medical Question    The rash is coming back with vengeance I don't want to have to go to ER. Doctor Sims suggest going to a dermatologist immediately while we are changing meds that may be causing this problem, can you please refer me to a dermatologist within the [facility] system please and if possible set up and expedited appointment thank you

## 2019-02-25 ENCOUNTER — HOSPITAL ENCOUNTER (OUTPATIENT)
Facility: MEDICAL CENTER | Age: 69
End: 2019-02-25
Attending: DERMATOLOGY
Payer: MEDICARE

## 2019-02-25 ENCOUNTER — OFFICE VISIT (OUTPATIENT)
Dept: DERMATOLOGY | Facility: IMAGING CENTER | Age: 69
End: 2019-02-25
Payer: MEDICARE

## 2019-02-25 VITALS — WEIGHT: 215 LBS | BODY MASS INDEX: 26.73 KG/M2 | TEMPERATURE: 97 F | HEIGHT: 75 IN

## 2019-02-25 DIAGNOSIS — L98.9 DISEASE OF SKIN AND SUBCUTANEOUS TISSUE: ICD-10-CM

## 2019-02-25 PROCEDURE — 11104 PUNCH BX SKIN SINGLE LESION: CPT | Performed by: DERMATOLOGY

## 2019-02-25 PROCEDURE — 99203 OFFICE O/P NEW LOW 30 MIN: CPT | Mod: 25 | Performed by: DERMATOLOGY

## 2019-02-25 PROCEDURE — 88312 SPECIAL STAINS GROUP 1: CPT

## 2019-02-25 PROCEDURE — 88305 TISSUE EXAM BY PATHOLOGIST: CPT

## 2019-02-25 RX ORDER — BETAMETHASONE DIPROPIONATE 0.5 MG/G
1 CREAM TOPICAL 2 TIMES DAILY
Qty: 45 G | Refills: 2 | Status: SHIPPED | OUTPATIENT
Start: 2019-02-25 | End: 2019-03-13 | Stop reason: SDUPTHER

## 2019-02-25 RX ORDER — TRIAMCINOLONE ACETONIDE 1 MG/G
1 CREAM TOPICAL 2 TIMES DAILY
Qty: 60 G | Refills: 2 | Status: SHIPPED | OUTPATIENT
Start: 2019-02-25 | End: 2019-07-29

## 2019-02-25 RX ORDER — PREDNISONE 10 MG/1
TABLET ORAL
Qty: 60 TAB | Refills: 0 | Status: SHIPPED | OUTPATIENT
Start: 2019-02-25 | End: 2019-03-13 | Stop reason: SDUPTHER

## 2019-02-25 ASSESSMENT — ENCOUNTER SYMPTOMS
CHILLS: 0
FEVER: 0
SHORTNESS OF BREATH: 1

## 2019-02-25 NOTE — PROGRESS NOTES
Dermatology New Patient Visit    Chief Complaint   Patient presents with   • Rash       Subjective:     HPI:   Francesco Schulte is a 68 y.o. male presenting for    Rash - on and off all over the body  HPI:currently on arms/shoulders, trunk >> legs  Has been more widespread/worse in the past  Onset: before Christmas, hospitalized 1/2/19 for chest discomfort + rash  Symptoms: itchy, burning hot skin  Aggravating factors: sun + possibly hydroxyurea?  Alleviating factors: Prednisone   New creams/topicals: n/a  New medications (up to last 6 months): n/a  New travel: n/a  Other exposures: Agent orange years ago  Treatments: See hospital notes, also treated with prednisone since being out of hospital.  Was started on hydroxyurea 8-10 years ago when polycythemia vera diagnosed - started getting mild rashes once on this treatment, was previously managed by triamcinolone 0.1% cream prn; recently (2-3 months ago) has gotten much worse, no longer controlled with topical steroids, has been on and off systemic steroids   Currently only using triamcinolone cream - not controlling    HPI: also was diagnosed with fungal rash, in between thighs & on buttocks   Onset: same as above  Symptoms: severe red inflamed skin  Aggravating factors: unknown  Treatments: terbinafine - s/ 2 week course - much improved  Still minimally itchy          Past Medical History:   Diagnosis Date   • Anesthesia     resistant to pain meds   • Cancer (HCC)     polycythemia vera   • Diabetes (HCC)     insulin and oral meds   • Family history of punctured lung 2002    left lung   • Heart attack (HCC) 03/2017   • Hemorrhagic disorder (HCC)     on blood thinners   • High cholesterol    • Ischemic cardiomyopathy    • Kidney stones     hx of kidney stones   • Orthostatic hypotension 3/29/2018   • Pain     headache pain   • Polycythemia vera(238.4)    • Stroke (HCC) 2016    r/t afib; eye issues resolved afterward   • Urinary bladder disorder     enlarged  "prostate, weak stream, difficulty urinating   • Vertigo        Current Outpatient Prescriptions on File Prior to Visit   Medication Sig Dispense Refill   • terbinafine (LAMISIL) 250 MG Tab TAKE 1 TABLET BY MOUTH DAILY FOR 7 DAYS  0   • predniSONE (DELTASONE) 20 MG Tab Take 1 Tab by mouth every day for 14 days. (Patient not taking: Reported on 2/14/2019) 14 Tab 0   • insulin glargine (LANTUS) 100 UNIT/ML Solution Inject 10 Units as instructed every evening as needed.     • diphenhydrAMINE-ZnAcetate (BENADRYL ITCH) 1-0.1 % Cream Apply thin layer over effected area 1 Tube 0   • apixaban (ELIQUIS) 5mg Tab Take 1 Tab by mouth 2 Times a Day. 180 Tab 3   • aspirin EC (ECOTRIN) 81 MG Tablet Delayed Response Take 1 Tab by mouth every day. 30 Tab    • therapeutic multivitamin-minerals (THERAGRAN-M) Tab Take 1 Tab by mouth every day.     • hydroxyurea (HYDREA) 500 MG Cap Take 1,000 mg by mouth every morning.     • tamsulosin (FLOMAX) 0.4 MG capsule Take 0.4 mg by mouth every evening.     • finasteride (PROSCAR) 5 MG Tab Take 5 mg by mouth every day.     • colesevelam (WELCHOL) 625 MG Tab Take 625 mg by mouth 2 times a day, with meals.     • glipiZIDE (GLUCOTROL) 10 MG Tab Take 15 mg by mouth every day.       No current facility-administered medications on file prior to visit.        Allergies   Allergen Reactions   • Beta Adrenergic Blockers Unspecified     dizziness   • Metformin Unspecified and Palpitations     Cardiac effects  \"Similar to a heart attack, I was hospitalized\"   • Simvastatin      Other reaction(s): Other (Specify with Comments)  Myalgias  Myalgias   • Statins [Hmg-Coa-R Inhibitors]      Muscle ache, joint pain       No family history on file.    Social History     Social History   • Marital status:      Spouse name: N/A   • Number of children: N/A   • Years of education: N/A     Occupational History   • Not on file.     Social History Main Topics   • Smoking status: Never Smoker   • Smokeless tobacco: " "Never Used   • Alcohol use No   • Drug use: No   • Sexual activity: Not on file     Other Topics Concern   • Not on file     Social History Narrative   • No narrative on file       Review of Systems   Constitutional: Positive for malaise/fatigue. Negative for chills and fever.   Respiratory: Positive for shortness of breath.    Cardiovascular: Negative for chest pain.   Musculoskeletal: Positive for joint pain.   Skin: Positive for itching and rash.   All other systems reviewed and are negative.       Objective:     A focused cutaneous exam was completed including: scalp, hair, ears, face, eyelids, conjunctiva, lips, neck, chest, abdomen, back, left and right upper extremities (including hands/digits and fingernails), left and right lower extremities (including feet/toes, toenails), buttocks with the following pertinent findings listed below. Remaining above-listed examined areas within normal limits / negative for rashes or lesions.    Temperature 36.1 °C (97 °F), height 1.905 m (6' 3\"), weight 97.5 kg (215 lb).    Physical Exam   Constitutional: He is oriented to person, place, and time and well-developed, well-nourished, and in no distress.   HENT:   Head: Normocephalic and atraumatic.   Right Ear: External ear normal.   Left Ear: External ear normal.   Nose: Nose normal.   Eyes: Conjunctivae are normal.   Neck: Normal range of motion.   Pulmonary/Chest: Effort normal.   Neurological: He is alert and oriented to person, place, and time.   Skin: Skin is warm and dry.        Psychiatric: Mood and affect normal.   Vitals reviewed.      DATA: Labs reviewed in chart, eos wnl    Assessment and Plan:     1. Disease of skin and subcutaneous tissue  Comment: Ddx drug-induced eczematous vs lichenoid dermatitis vs eczema vs CTD vs less likely fungal vs unlikely PF vs other  - Pathology Specimen; Future - see procedure note below  - start prednisone 40mg daily with taper (down by 10 every 5 days)  - common side effects of " prednisone including, but not limited to, mild GI disturbance, alteration in mood/aggitation/difficulty sleeping, elevated blood pressure, increased appetite and weight gain, increased blood glucose level discussed. Additionally discussed more long-term risks including, but not limited to, osteoporosis/osteonecrosis risk, as well as cataracts/glaucoma risk, steroid withdrawl, cushingoid changes, PUD, reactivation of infections, which are unlikely with this short treatment course.  - recommend ca/vit D   - start triamcinolone 0.1% cream to affected area of the body twice daily. Patient instructed to avoid face, axilla, and groin area. Side effects discussed, including skin thinning, appearance of stretch marks (striae), easy bruising, telangiectasias, and possible increased hair growth   - start loprox 0.77% cream to the affected area in the groin BID    Procedure Note   Procedure: Biopsy by punch technique  Location: left shoulder  Preoperative diagnosis:see above  Risks, benefits and alternatives of procedure discussed and written informed consent obtained. Time out completed. Area of biopsy prepped with alcohol. Anesthesia with 1% lidocaine with epinephrine administered with a 30 gauge needle. 4  mm punch biopsy of site performed. Hemostasis achieved with pressure and 4.0 prolene sutures. Vaseline applied to wound with bandage. Patient tolerated procedure well, and there were no complications.  The pathology specimen was sent to the lab via the staff.  Wound care was discussed with the patient.     Followup: Return in about 2 weeks (around 3/11/2019).    Beulah Kendrick M.D.

## 2019-02-26 LAB — PATHOLOGY CONSULT NOTE: NORMAL

## 2019-03-04 DIAGNOSIS — L98.9 DISORDER OF SKIN AND SUBCUTANEOUS TISSUE: ICD-10-CM

## 2019-03-06 ENCOUNTER — HOSPITAL ENCOUNTER (OUTPATIENT)
Dept: LAB | Facility: MEDICAL CENTER | Age: 69
End: 2019-03-06
Attending: NURSE PRACTITIONER
Payer: MEDICARE

## 2019-03-06 ENCOUNTER — HOSPITAL ENCOUNTER (OUTPATIENT)
Dept: LAB | Facility: MEDICAL CENTER | Age: 69
End: 2019-03-06
Attending: DERMATOLOGY
Payer: MEDICARE

## 2019-03-06 DIAGNOSIS — L98.9 DISEASE OF SKIN AND SUBCUTANEOUS TISSUE: ICD-10-CM

## 2019-03-06 DIAGNOSIS — L98.9 DISORDER OF SKIN AND SUBCUTANEOUS TISSUE: ICD-10-CM

## 2019-03-06 DIAGNOSIS — E11.8 TYPE 2 DIABETES MELLITUS WITH COMPLICATION, WITHOUT LONG-TERM CURRENT USE OF INSULIN (HCC): ICD-10-CM

## 2019-03-06 LAB
EST. AVERAGE GLUCOSE BLD GHB EST-MCNC: 206 MG/DL
HBA1C MFR BLD: 8.8 % (ref 0–5.6)

## 2019-03-06 PROCEDURE — 86235 NUCLEAR ANTIGEN ANTIBODY: CPT

## 2019-03-06 PROCEDURE — 36415 COLL VENOUS BLD VENIPUNCTURE: CPT

## 2019-03-06 PROCEDURE — 83036 HEMOGLOBIN GLYCOSYLATED A1C: CPT | Mod: GA

## 2019-03-06 PROCEDURE — 86256 FLUORESCENT ANTIBODY TITER: CPT

## 2019-03-06 PROCEDURE — 86038 ANTINUCLEAR ANTIBODIES: CPT

## 2019-03-06 PROCEDURE — 86039 ANTINUCLEAR ANTIBODIES (ANA): CPT

## 2019-03-06 PROCEDURE — 86225 DNA ANTIBODY NATIVE: CPT

## 2019-03-08 ENCOUNTER — PATIENT MESSAGE (OUTPATIENT)
Dept: MEDICAL GROUP | Facility: MEDICAL CENTER | Age: 69
End: 2019-03-08

## 2019-03-08 ENCOUNTER — PATIENT MESSAGE (OUTPATIENT)
Dept: HEALTH INFORMATION MANAGEMENT | Facility: OTHER | Age: 69
End: 2019-03-08

## 2019-03-08 LAB — NUCLEAR IGG SER QL IA: DETECTED

## 2019-03-08 RX ORDER — GLIPIZIDE 10 MG/1
10 TABLET ORAL 2 TIMES DAILY
Qty: 60 TAB | Refills: 5 | Status: SHIPPED | OUTPATIENT
Start: 2019-03-08 | End: 2019-05-06

## 2019-03-09 ENCOUNTER — PATIENT MESSAGE (OUTPATIENT)
Dept: MEDICAL GROUP | Facility: MEDICAL CENTER | Age: 69
End: 2019-03-09

## 2019-03-09 ENCOUNTER — TELEPHONE (OUTPATIENT)
Dept: HEALTH INFORMATION MANAGEMENT | Facility: OTHER | Age: 69
End: 2019-03-09

## 2019-03-09 DIAGNOSIS — I50.22 CHRONIC SYSTOLIC CHF (CONGESTIVE HEART FAILURE) (HCC): ICD-10-CM

## 2019-03-09 LAB
ANA INTERPRETIVE COMMENT Q5143: ABNORMAL
ANA PATTERN Q5144: ABNORMAL
ANA TITER Q5145: ABNORMAL
ANTINUCLEAR ANTIBODY (ANA), HEP-2, IGG Q5142: DETECTED
CYTOPLASMIC PATTERN TITER Q5148: ABNORMAL

## 2019-03-09 NOTE — TELEPHONE ENCOUNTER
Francesco has been identified as a patient who could benefit from the services of the Heart Failure Program with [facility] Card[facility]. Please review the pended referral order and sign, then route back to the Patient Outreach Team with the action taken if this is a service that you wish for Francesco to receive.

## 2019-03-10 LAB
DSDNA AB TITR SER CLIF: DETECTED {TITER}
ENA JO1 AB TITR SER: 2 AU/ML (ref 0–40)
ENA SCL70 IGG SER QL: 1 AU/ML (ref 0–40)
ENA SM IGG SER-ACNC: 11 AU/ML (ref 0–40)
ENA SS-B IGG SER IA-ACNC: 15 AU/ML (ref 0–40)
SSA52 R0ENA AB IGG Q0420: 269 AU/ML (ref 0–40)
SSA60 R0ENA AB IGG Q0419: 98 AU/ML (ref 0–40)
U1 SNRNP IGG SER QL: 7 AU/ML (ref 0–40)

## 2019-03-10 NOTE — TELEPHONE ENCOUNTER
From: Francesco Schulte  To: ALVINO Harman  Sent: 3/9/2019 8:09 AM PST  Subject: RE: Non-Urgent Medical Question    Yes on referral, please ASAP     ----- Message -----  From: ALVINO Harman  Sent: 3/8/19 3:04 PM  To: Bob Schulte  Subject: RE: Non-Urgent Medical Question    José Miguel Rojas,  It looks like you have not seen my recent message- I copied it below. You are right and that the steroid is causing your A1c to increase, but we still need to do more to get it under control. Please let me know what you think about a referral.  I have sent refill of your medication.  Cheyenne Maya,   Your A1c has increased quite a bit, which, as we have discussed in the past, is likely related to your steroid therapy.  I would like to request a consultation with an endocrinologist for review of your therapy and recommendations, is it okay with you if I place a referral?   [place] GOLDEN       ----- Message -----   From: Bob Schulte   Sent: 3/8/2019 12:08 PM PST   To: ALVINO Harman  Subject: Non-Urgent Medical Question    I take 10 to 20 mg of Glipizide daily. Please refill at [facility]. A1C taken 3/6 while on prednisone roller coaster again for rash issues. Thank you

## 2019-03-10 NOTE — TELEPHONE ENCOUNTER
From: Francesco Schulte  To: ALVINO Harman  Sent: 3/8/2019 5:08 PM PST  Subject: Non-Urgent Medical Question    I have a question about Hemoglobin A1C resulted on 3/6/19 at 5:48 PM.    Sure on referral. What's an endo....do?

## 2019-03-11 ENCOUNTER — PATIENT MESSAGE (OUTPATIENT)
Dept: MEDICAL GROUP | Facility: MEDICAL CENTER | Age: 69
End: 2019-03-11

## 2019-03-11 LAB — DSDNA IGG TITR SER CLIF: NORMAL {TITER}

## 2019-03-11 NOTE — TELEPHONE ENCOUNTER
From: Francesco Schulte  To: ALVINO Harman  Sent: 3/11/2019 8:20 AM PDT  Subject: RE: Non-Urgent Medical Question    Are you setting up referral? Will they call me?    ----- Message -----  From: ALVINO Harman  Sent: 3/10/19 10:06 AM  To: Bob Schulte  Subject: RE: Non-Urgent Medical Question    Endocrinology specialists are diabetes and hormone experts. Naveed Mesa with [facility] is great if you're able to schedule with him.      ----- Message -----   From: Bob Schulte   Sent: 3/8/2019 5:08 PM PST   To: ALVINO Harman  Subject: Non-Urgent Medical Question    I have a question about Hemoglobin A1C resulted on 3/6/19 at 5:48 PM.    Sure on referral. What's an endo....do?

## 2019-03-12 DIAGNOSIS — R76.0 ABNORMAL ANTINUCLEAR ANTIBODY TITER: ICD-10-CM

## 2019-03-13 ENCOUNTER — OFFICE VISIT (OUTPATIENT)
Dept: DERMATOLOGY | Facility: IMAGING CENTER | Age: 69
End: 2019-03-13
Payer: MEDICARE

## 2019-03-13 DIAGNOSIS — Z51.81 MEDICATION MONITORING ENCOUNTER: ICD-10-CM

## 2019-03-13 DIAGNOSIS — L98.9 DISORDER OF THE SKIN AND SUBCUTANEOUS TISSUE, UNSPECIFIED: ICD-10-CM

## 2019-03-13 DIAGNOSIS — R76.0 ABNORMAL ANTINUCLEAR ANTIBODY TITER: ICD-10-CM

## 2019-03-13 DIAGNOSIS — L30.9 DERMATITIS: ICD-10-CM

## 2019-03-13 PROCEDURE — 99214 OFFICE O/P EST MOD 30 MIN: CPT | Performed by: DERMATOLOGY

## 2019-03-13 RX ORDER — BETAMETHASONE DIPROPIONATE 0.5 MG/G
90 CREAM TOPICAL 2 TIMES DAILY
Qty: 90 G | Refills: 4 | Status: SHIPPED | OUTPATIENT
Start: 2019-03-13 | End: 2019-07-29

## 2019-03-13 RX ORDER — PREDNISONE 10 MG/1
TABLET ORAL
Qty: 30 TAB | Refills: 0 | Status: SHIPPED | OUTPATIENT
Start: 2019-03-13 | End: 2019-04-25

## 2019-03-13 ASSESSMENT — ENCOUNTER SYMPTOMS
FEVER: 0
SHORTNESS OF BREATH: 1
CHILLS: 0

## 2019-03-13 NOTE — PROGRESS NOTES
"Dermatology Return Patient Visit    Chief Complaint   Patient presents with   • Rash   • Follow-Up       Subjective:     HPI:   Francesco Schulte is a 68 y.o. male presenting for    Follow up, dermatitis  No improvement, but has been \"managable\" since last visit  Currently on prednisone 10mg, using betamethasone - tac was not helping  Blood sugars have been uncontrolled, PCP referred pt to endocrinology; awaiting appt  S/p bx most suggestive of CTD vs drup eruption/fixed drug  S/p ADAM with reflex with several abnormalities - SSA52 269 SSA60 98  Pt denies any sicca symptoms  Rash is sun sensitive, but no h/o butterfly rash, +mouth sores, but attributed to hydroxyurea    History:  Onset: before Christmas, hospitalized 1/2/19 for chest discomfort + rash  Symptoms: itchy, burning hot skin  Aggravating factors: sun + possibly hydroxyurea?  Alleviating factors: Prednisone   New creams/topicals: n/a  New medications (up to last 6 months): n/a  New travel: n/a  Other exposures: Agent orange years ago  Treatments: See hospital notes, also treated with prednisone since being out of hospital.  Was started on hydroxyurea 8-10 years ago when polycythemia vera diagnosed - started getting mild rashes once on this treatment, was previously managed by triamcinolone 0.1% cream prn; recently (2-3 months ago) has gotten much worse, no longer controlled with topical steroids, has been on and off systemic steroids     Tinea corporis, s/p treatment - not discussed  Onset: same as above  Symptoms: severe red inflamed skin  Aggravating factors: unknown  Treatments: terbinafine - s/ 2 week course - much improved; using loprox        Rash   Associated symptoms include joint pain and shortness of breath. Pertinent negatives include no fever.       Past Medical History:   Diagnosis Date   • Anesthesia     resistant to pain meds   • Cancer (HCC)     polycythemia vera   • Diabetes (HCC)     insulin and oral meds   • Family history of " punctured lung 2002    left lung   • Heart attack (HCC) 03/2017   • Hemorrhagic disorder ([facility])     on blood thinners   • High cholesterol    • Ischemic cardiomyopathy    • Kidney stones     hx of kidney stones   • Orthostatic hypotension 3/29/2018   • Pain     headache pain   • Polycythemia vera(238.4)    • Stroke ([facility]) 2016    r/t afib; eye issues resolved afterward   • Urinary bladder disorder     enlarged prostate, weak stream, difficulty urinating   • Vertigo        Current Outpatient Prescriptions on File Prior to Visit   Medication Sig Dispense Refill   • glipiZIDE (GLUCOTROL) 10 MG Tab Take 1 Tab by mouth 2 times a day. 60 Tab 5   • predniSONE (DELTASONE) 10 MG Tab 4 tablets daily x 5 days, 3 tabs x 5 days, 2 tabs x 5 days, 1 tab x 5 days, 1/2 tab x 5 days 60 Tab 0   • triamcinolone acetonide (KENALOG) 0.1 % Cream Apply 1 Application to affected area(s) 2 times a day. 60 g 2   • ciclopirox (LOPROX) 0.77 % cream Twice daily to the affected area in the groin 1 Tube 1   • betamethasone dipropionate (DIPROLENE) 0.05 % Cream Apply 1 Application to affected area(s) 2 times a day. To severe areas not responding to TAC 45 g 2   • terbinafine (LAMISIL) 250 MG Tab TAKE 1 TABLET BY MOUTH DAILY FOR 7 DAYS  0   • insulin glargine (LANTUS) 100 UNIT/ML Solution Inject 10 Units as instructed every evening as needed.     • diphenhydrAMINE-ZnAcetate (BENADRYL ITCH) 1-0.1 % Cream Apply thin layer over effected area 1 Tube 0   • apixaban (ELIQUIS) 5mg Tab Take 1 Tab by mouth 2 Times a Day. 180 Tab 3   • aspirin EC (ECOTRIN) 81 MG Tablet Delayed Response Take 1 Tab by mouth every day. 30 Tab    • therapeutic multivitamin-minerals (THERAGRAN-M) Tab Take 1 Tab by mouth every day.     • hydroxyurea (HYDREA) 500 MG Cap Take 1,000 mg by mouth every morning.     • tamsulosin (FLOMAX) 0.4 MG capsule Take 0.4 mg by mouth every evening.     • finasteride (PROSCAR) 5 MG Tab Take 5 mg by mouth every day.     • colesevelam (WELCHOL) 625 MG  "Tab Take 625 mg by mouth 2 times a day, with meals.       No current facility-administered medications on file prior to visit.        Allergies   Allergen Reactions   • Beta Adrenergic Blockers Unspecified     dizziness   • Metformin Unspecified and Palpitations     Cardiac effects  \"Similar to a heart attack, I was hospitalized\"   • Simvastatin      Other reaction(s): Other (Specify with Comments)  Myalgias  Myalgias   • Statins [Hmg-Coa-R Inhibitors]      Muscle ache, joint pain       No family history on file.    Social History     Social History   • Marital status:      Spouse name: N/A   • Number of children: N/A   • Years of education: N/A     Occupational History   • Not on file.     Social History Main Topics   • Smoking status: Never Smoker   • Smokeless tobacco: Never Used   • Alcohol use No   • Drug use: No   • Sexual activity: Not on file     Other Topics Concern   • Not on file     Social History Narrative   • No narrative on file       Review of Systems   Constitutional: Positive for malaise/fatigue. Negative for chills and fever.   Respiratory: Positive for shortness of breath.    Cardiovascular: Negative for chest pain.   Musculoskeletal: Positive for joint pain.   Skin: Positive for itching and rash.   All other systems reviewed and are negative.       Objective:     A focused cutaneous exam was completed including: scalp, hair, ears, face, eyelids, conjunctiva, lips, neck, chest, abdomen, back, left and right upper extremities (including hands/digits and fingernails) with the following pertinent findings listed below. Remaining above-listed examined areas within normal limits / negative for rashes or lesions.    There were no vitals taken for this visit.    Physical Exam   Constitutional: He is oriented to person, place, and time and well-developed, well-nourished, and in no distress.   HENT:   Head: Normocephalic and atraumatic.       Right Ear: External ear normal.   Left Ear: External ear " normal.   Nose: Nose normal.   Eyes: Conjunctivae are normal.   Neck: Normal range of motion.   Pulmonary/Chest: Effort normal.   Neurological: He is alert and oriented to person, place, and time.   Skin: Skin is warm and dry.        Psychiatric: Mood and affect normal.       DATA: Labs reviewed in chart, eos wnl    Assessment and Plan:     1. Disease of skin and subcutaneous tissuedermatitis  Comment: Ddx seems most like CTD/sle ?scle vs drug-induced lichenoid dermatitis vs other  - continue prednisone 10mg daily for now - emphasized importance of finding safe, alternative RX to control symptoms 2/2 issues with blood sugar & cardiac hisotry  - common side effects of prednisone re-discussed, but not limited to, mild GI disturbance, alteration in mood/aggitation/difficulty sleeping, elevated blood pressure, increased appetite and weight gain, increased blood glucose level discussed. Additionally discussed more long-term risks including, but not limited to, osteoporosis/osteonecrosis risk, as well as cataracts/glaucoma risk, steroid withdrawl, cushingoid changes, PUD, reactivation of infections, which are unlikely with this short treatment course.  - recommend continue ca/vit D   - would like to start plaquenil, but will discuss with heme/onc (Dr. Sims), and will reach out to rheum (Dr. Sims, pt has seen in the past ~1 year ago for r/o GCA - would like input with current abnormal labs)  - urgent referral to ophtho placed  - Side effects, including, but not limited to, dizziness, headache, vertigo, skin rash, alopecia, dyschromia, nausea, diarrhea, abdominal pain, hepatic insufficiency, myalgias, vision problems (including permanent retinopathy), hypersensitivity discussed  - continue betamethasone 0.05% cream to affected area of the body twice daily only for flares until improved. Patient instructed to avoid face, axilla, and groin area. Side effects discussed, including skin thinning, appearance of stretch marks  (striae), easy bruising, telangiectasias, and possible increased hair growth     3. Abnormal antinuclear antibody titer  - Anti-Neutrophil Cytoplasmic Antibodies Screen; Future  - LUPUS ANTICOAGULANT; Future  - BETA -2 GLYCOPROTEIN I AB NELLY; Future  - ANTICARDIOLIPIN AB IGG,IGM,IGA; Future    Risk Assessment: This patient has multiple ongoing medical problems that require continued observation and management. Potential systemic therapy, if pursued, will be complicated in this patient given numerous side effects and h/o other chronic medical issues.    Followup: Return in about 2 weeks (around 3/27/2019).    Beulah Kendrick M.D.

## 2019-03-13 NOTE — Clinical Note
José Miguel DEL CID I spoke with rheum & onc and we are going to start Bob on plaquenil. He has appt with ophtho at the beginning of April -- would be good to have him seen by endo as soon as possible as well for possible issues with sugar control. Patient d/c'ed his prednisone on his own, I hope that his topicals will keep everything under control until new med starts working.  Regards, Yessy Kendrick

## 2019-03-14 ENCOUNTER — PATIENT MESSAGE (OUTPATIENT)
Dept: MEDICAL GROUP | Facility: MEDICAL CENTER | Age: 69
End: 2019-03-14

## 2019-03-14 DIAGNOSIS — E11.59 TYPE 2 DIABETES MELLITUS WITH OTHER CIRCULATORY COMPLICATION, WITH LONG-TERM CURRENT USE OF INSULIN (HCC): ICD-10-CM

## 2019-03-14 DIAGNOSIS — G44.52 NEW DAILY PERSISTENT HEADACHE: ICD-10-CM

## 2019-03-14 DIAGNOSIS — Z79.4 TYPE 2 DIABETES MELLITUS WITH OTHER CIRCULATORY COMPLICATION, WITH LONG-TERM CURRENT USE OF INSULIN (HCC): ICD-10-CM

## 2019-03-14 RX ORDER — SYRINGE-NEEDLE,INSULIN,0.5 ML 31 GX5/16"
SYRINGE, EMPTY DISPOSABLE MISCELLANEOUS
Qty: 100 EACH | Refills: 2 | Status: SHIPPED | OUTPATIENT
Start: 2019-03-14 | End: 2019-09-20

## 2019-03-15 NOTE — ASSESSMENT & PLAN NOTE
For prior MI's  Multiple stents placed  History of intolerance to statins  Denies any recent chest pain, diaphoresis, activity intolerance   HEADACHE

## 2019-03-18 RX ORDER — HYDROXYCHLOROQUINE SULFATE 200 MG/1
400 TABLET, FILM COATED ORAL DAILY
Qty: 60 TAB | Refills: 2 | Status: SHIPPED | OUTPATIENT
Start: 2019-03-18 | End: 2019-05-10 | Stop reason: SDUPTHER

## 2019-03-19 ENCOUNTER — HOSPITAL ENCOUNTER (OUTPATIENT)
Dept: LAB | Facility: MEDICAL CENTER | Age: 69
End: 2019-03-19
Attending: INTERNAL MEDICINE
Payer: MEDICARE

## 2019-03-19 ENCOUNTER — HOSPITAL ENCOUNTER (OUTPATIENT)
Dept: LAB | Facility: MEDICAL CENTER | Age: 69
End: 2019-03-19
Attending: DERMATOLOGY
Payer: MEDICARE

## 2019-03-19 DIAGNOSIS — R76.0 ABNORMAL ANTINUCLEAR ANTIBODY TITER: ICD-10-CM

## 2019-03-19 LAB
APPEARANCE UR: CLEAR
BILIRUB UR QL STRIP.AUTO: NEGATIVE
C3 SERPL-MCNC: 62 MG/DL (ref 87–200)
C4 SERPL-MCNC: <8 MG/DL (ref 19–52)
CK SERPL-CCNC: 17 U/L (ref 0–154)
COLOR UR: YELLOW
CREAT UR-MCNC: 107.4 MG/DL
CRP SERPL HS-MCNC: 0.18 MG/DL (ref 0–0.75)
ERYTHROCYTE [SEDIMENTATION RATE] IN BLOOD BY WESTERGREN METHOD: 31 MM/HOUR (ref 0–20)
GLUCOSE UR STRIP.AUTO-MCNC: 250 MG/DL
KETONES UR STRIP.AUTO-MCNC: NEGATIVE MG/DL
LEUKOCYTE ESTERASE UR QL STRIP.AUTO: NEGATIVE
MICRO URNS: ABNORMAL
NITRITE UR QL STRIP.AUTO: NEGATIVE
PH UR STRIP.AUTO: 6.5 [PH]
PROT UR QL STRIP: NEGATIVE MG/DL
PROT UR-MCNC: 11 MG/DL (ref 0–15)
PROT/CREAT UR: 102 MG/G (ref 15–68)
RBC UR QL AUTO: NEGATIVE
RHEUMATOID FACT SER IA-ACNC: <10 IU/ML (ref 0–14)
SP GR UR STRIP.AUTO: 1.01
URATE SERPL-MCNC: 5.7 MG/DL (ref 2.5–8.3)
UROBILINOGEN UR STRIP.AUTO-MCNC: 0.2 MG/DL

## 2019-03-19 PROCEDURE — 86147 CARDIOLIPIN ANTIBODY EA IG: CPT | Mod: 91

## 2019-03-19 PROCEDURE — 36415 COLL VENOUS BLD VENIPUNCTURE: CPT

## 2019-03-19 PROCEDURE — 86200 CCP ANTIBODY: CPT

## 2019-03-19 PROCEDURE — 86160 COMPLEMENT ANTIGEN: CPT | Mod: 91

## 2019-03-19 PROCEDURE — 86146 BETA-2 GLYCOPROTEIN ANTIBODY: CPT | Mod: 91

## 2019-03-19 PROCEDURE — 86431 RHEUMATOID FACTOR QUANT: CPT

## 2019-03-19 PROCEDURE — 85652 RBC SED RATE AUTOMATED: CPT

## 2019-03-19 PROCEDURE — 86140 C-REACTIVE PROTEIN: CPT

## 2019-03-19 PROCEDURE — 82570 ASSAY OF URINE CREATININE: CPT

## 2019-03-19 PROCEDURE — 81003 URINALYSIS AUTO W/O SCOPE: CPT

## 2019-03-19 PROCEDURE — 82550 ASSAY OF CK (CPK): CPT

## 2019-03-19 PROCEDURE — 84156 ASSAY OF PROTEIN URINE: CPT

## 2019-03-19 PROCEDURE — 84550 ASSAY OF BLOOD/URIC ACID: CPT

## 2019-03-19 PROCEDURE — 86255 FLUORESCENT ANTIBODY SCREEN: CPT

## 2019-03-21 LAB
B2 GLYCOPROT1 IGA SER-ACNC: 16 SAU (ref 0–20)
B2 GLYCOPROT1 IGG SERPL IA-ACNC: 0 SGU (ref 0–20)
B2 GLYCOPROT1 IGM SERPL IA-ACNC: 25 SMU (ref 0–20)
CARDIOLIPIN IGA SER IA-ACNC: 4 APL (ref 0–11)
CARDIOLIPIN IGG SER IA-ACNC: 3 GPL (ref 0–14)
CARDIOLIPIN IGM SER IA-ACNC: 29 MPL (ref 0–12)
CCP IGG SERPL-ACNC: 4 UNITS (ref 0–19)

## 2019-03-22 LAB — ANCA IGG TITR SER IF: NORMAL {TITER}

## 2019-03-25 ENCOUNTER — PATIENT MESSAGE (OUTPATIENT)
Dept: MEDICAL GROUP | Facility: MEDICAL CENTER | Age: 69
End: 2019-03-25

## 2019-03-25 RX ORDER — TAMSULOSIN HYDROCHLORIDE 0.4 MG/1
0.4 CAPSULE ORAL EVERY EVENING
Qty: 90 CAP | Refills: 3 | Status: SHIPPED | OUTPATIENT
Start: 2019-03-25 | End: 2019-12-05 | Stop reason: SDUPTHER

## 2019-04-03 ENCOUNTER — OFFICE VISIT (OUTPATIENT)
Dept: DERMATOLOGY | Facility: IMAGING CENTER | Age: 69
End: 2019-04-03
Payer: MEDICARE

## 2019-04-03 DIAGNOSIS — L98.9 DISORDER OF THE SKIN AND SUBCUTANEOUS TISSUE, UNSPECIFIED: ICD-10-CM

## 2019-04-03 PROCEDURE — 99214 OFFICE O/P EST MOD 30 MIN: CPT | Performed by: DERMATOLOGY

## 2019-04-03 ASSESSMENT — ENCOUNTER SYMPTOMS
HEARTBURN: 1
FEVER: 0
SHORTNESS OF BREATH: 1
NAUSEA: 1
CHILLS: 0

## 2019-04-03 NOTE — Clinical Note
José Miguel Dawn, would you have any issues with trying protonix for Bob? He is having some nausea/stomach upset with taking the plaquenil that he has started. I told him I would check to see if you wanted to start it/ if you had any concerns. Thanks!

## 2019-04-03 NOTE — PROGRESS NOTES
Dermatology Return Patient Visit    Chief Complaint   Patient presents with   • Follow-Up       Subjective:     HPI:   Francesco Schulte is a 68 y.o. male presenting for    Follow up. Dermatitis - bx suggestive of CTD?lupus  Under relatively good control today  Using betamethasone 0.05% cream Bid to areas on arms/trunk  Started plaquenil 3/18 400mg daily - having stomach upset for 1-3 hours after taking dose, but goes away - wants to know if he can take something for this  Stopped prednisone again last week (was taking 10mg daily)  Saw ophtho last week - exam OK for continued use of plaquenil, had other incidental issues - has f/u in 3 months  Saw Dr. Sims (rheum), agrees with plaquenil  Dr. Sims (heme) - increased hydroxyurea dose again recently    S/p ADAM with reflex with several abnormalities - ADAM+, SSA52 269 SSA60 98, complements low; +findings on antiphos ab syndrome panel (only first test)  Pt denies any sicca symptoms  Rash is sun sensitive, but no h/o butterfly rash, +mouth sores, but attributed to hydroxyurea    History:  Onset: before Christmas 2018, hospitalized 1/2/19 for chest discomfort + rash  Symptoms: itchy, burning hot skin  Aggravating factors: sun + possibly hydroxyurea?  Alleviating factors: Prednisone   New creams/topicals: n/a  New medications (up to last 6 months): n/a  New travel: n/a  Other exposures: Agent orange years ago  Prior treatments: See hospital notes, also treated with prednisone PO out of hospital  Was started on hydroxyurea 8-10 years ago when polycythemia vera diagnosed - started getting mild rashes once on this treatment, was previously managed by triamcinolone 0.1% cream prn; recently (3+ months ago) has gotten much worse, no longer controlled with topical steroids, has been on and off systemic steroids       Rash   Associated symptoms include joint pain and shortness of breath. Pertinent negatives include no fever.       Past Medical History:   Diagnosis Date   •  "Anesthesia     resistant to pain meds   • Cancer (HCC)     polycythemia vera   • Diabetes ([facility])     insulin and oral meds   • Family history of punctured lung 2002    left lung   • Heart attack (HCC) 03/2017   • Hemorrhagic disorder ([facility])     on blood thinners   • High cholesterol    • Ischemic cardiomyopathy    • Kidney stones     hx of kidney stones   • Orthostatic hypotension 3/29/2018   • Pain     headache pain   • Polycythemia vera(238.4)    • Stroke ([facility]) 2016    r/t afib; eye issues resolved afterward   • Urinary bladder disorder     enlarged prostate, weak stream, difficulty urinating   • Vertigo        Current Outpatient Prescriptions on File Prior to Visit   Medication Sig Dispense Refill   • tamsulosin (FLOMAX) 0.4 MG capsule Take 1 Cap by mouth every evening. 90 Cap 3   • hydroxychloroquine (PLAQUENIL) 200 MG Tab Take 2 Tabs by mouth every day. 60 Tab 2   • B-D INS SYRINGE 0.5CC/31GX5/16 31G X 5/16\" 0.5 ML Misc USE 1 SYRINGE EVERY DAY. 100 Each 2   • betamethasone dipropionate (DIPROLENE) 0.05 % Cream Apply 90 Applications to affected area(s) 2 times a day. To severe areas not responding to TAC 90 g 4   • predniSONE (DELTASONE) 10 MG Tab Take 1 tablet daily 30 Tab 0   • glipiZIDE (GLUCOTROL) 10 MG Tab Take 1 Tab by mouth 2 times a day. 60 Tab 5   • triamcinolone acetonide (KENALOG) 0.1 % Cream Apply 1 Application to affected area(s) 2 times a day. 60 g 2   • ciclopirox (LOPROX) 0.77 % cream Twice daily to the affected area in the groin 1 Tube 1   • terbinafine (LAMISIL) 250 MG Tab TAKE 1 TABLET BY MOUTH DAILY FOR 7 DAYS  0   • insulin glargine (LANTUS) 100 UNIT/ML Solution Inject 10 Units as instructed every evening as needed.     • diphenhydrAMINE-ZnAcetate (BENADRYL ITCH) 1-0.1 % Cream Apply thin layer over effected area 1 Tube 0   • apixaban (ELIQUIS) 5mg Tab Take 1 Tab by mouth 2 Times a Day. 180 Tab 3   • aspirin EC (ECOTRIN) 81 MG Tablet Delayed Response Take 1 Tab by mouth every day. 30 Tab    • " "therapeutic multivitamin-minerals (THERAGRAN-M) Tab Take 1 Tab by mouth every day.     • hydroxyurea (HYDREA) 500 MG Cap Take 1,000 mg by mouth every morning.     • finasteride (PROSCAR) 5 MG Tab Take 5 mg by mouth every day.     • colesevelam (WELCHOL) 625 MG Tab Take 625 mg by mouth 2 times a day, with meals.       No current facility-administered medications on file prior to visit.        Allergies   Allergen Reactions   • Beta Adrenergic Blockers Unspecified     dizziness   • Metformin Unspecified and Palpitations     Cardiac effects  \"Similar to a heart attack, I was hospitalized\"   • Simvastatin      Other reaction(s): Other (Specify with Comments)  Myalgias  Myalgias   • Statins [Hmg-Coa-R Inhibitors]      Muscle ache, joint pain       No family history on file.    Social History     Social History   • Marital status:      Spouse name: N/A   • Number of children: N/A   • Years of education: N/A     Occupational History   • Not on file.     Social History Main Topics   • Smoking status: Never Smoker   • Smokeless tobacco: Never Used   • Alcohol use No   • Drug use: No   • Sexual activity: Not on file     Other Topics Concern   • Not on file     Social History Narrative   • No narrative on file       Review of Systems   Constitutional: Positive for malaise/fatigue. Negative for chills and fever.   Respiratory: Positive for shortness of breath.    Cardiovascular: Negative for chest pain.   Gastrointestinal: Positive for heartburn and nausea.   Musculoskeletal: Positive for joint pain.   Skin: Positive for itching and rash.   All other systems reviewed and are negative.       Objective:     A focused cutaneous exam was completed including: scalp, hair, ears, face, eyelids, conjunctiva, lips, neck, chest, abdomen, back, left and right upper extremities (including hands/digits and fingernails) with the following pertinent findings listed below. Remaining above-listed examined areas within normal limits / " negative for rashes or lesions.    There were no vitals taken for this visit.    Physical Exam   Constitutional: He is oriented to person, place, and time and well-developed, well-nourished, and in no distress.   HENT:   Head: Normocephalic and atraumatic.       Right Ear: External ear normal.   Left Ear: External ear normal.   Nose: Nose normal.   Eyes: Conjunctivae are normal.   Neck: Normal range of motion.   Pulmonary/Chest: Effort normal.   Neurological: He is alert and oriented to person, place, and time.   Skin: Skin is warm and dry.        Psychiatric: Mood and affect normal.       DATA: Labs reviewed in chart, eos wnl    Assessment and Plan:     1. Disease of skin and subcutaneous tissuedermatitis  Comment: Ddx seems most like CTD/sle ?scle vs drug-induced lichenoid dermatitis vs other  - cont plaquenil 200mg BID (or 400mg daily). Side effects, including, but not limited to, dizziness, headache, vertigo, skin rash, alopecia, dyschromia, nausea, diarrhea, abdominal pain, hepatic insufficiency, myalgias, vision problems (including permanent retinopathy), hypersensitivity re-discussed  - f/u with ophtho/rheum as instructed  - patient having GI upset 2/2 new medication; will discuss management options with PCP (message sent to Nereyda Moore)  - continue betamethasone 0.05% cream to affected area of the body twice daily only for flares until improved. Patient instructed to avoid face, axilla, and groin area. Side effects discussed, including skin thinning, appearance of stretch marks (striae), easy bruising, telangiectasias, and possible increased hair growth     Risk Assessment: This patient has multiple ongoing medical problems that require continued observation and management. Potential systemic therapy, if pursued, will be complicated in this patient given numerous side effects and h/o other chronic medical issues.    Followup: Return in about 2 months (around 6/3/2019), or if symptoms worsen or fail to  improve.    Beulah Kendrick M.D.

## 2019-04-16 ENCOUNTER — TELEPHONE (OUTPATIENT)
Dept: MEDICAL GROUP | Facility: MEDICAL CENTER | Age: 69
End: 2019-04-16

## 2019-04-25 ENCOUNTER — OFFICE VISIT (OUTPATIENT)
Dept: MEDICAL GROUP | Facility: MEDICAL CENTER | Age: 69
End: 2019-04-25
Payer: MEDICARE

## 2019-04-25 VITALS
DIASTOLIC BLOOD PRESSURE: 72 MMHG | WEIGHT: 218 LBS | BODY MASS INDEX: 27.1 KG/M2 | HEART RATE: 93 BPM | OXYGEN SATURATION: 96 % | SYSTOLIC BLOOD PRESSURE: 110 MMHG | HEIGHT: 75 IN | TEMPERATURE: 96.9 F | RESPIRATION RATE: 16 BRPM

## 2019-04-25 DIAGNOSIS — E11.8 TYPE 2 DIABETES MELLITUS WITH COMPLICATION, WITHOUT LONG-TERM CURRENT USE OF INSULIN (HCC): ICD-10-CM

## 2019-04-25 DIAGNOSIS — I48.0 PAROXYSMAL ATRIAL FIBRILLATION (HCC): ICD-10-CM

## 2019-04-25 DIAGNOSIS — N40.0 BENIGN PROSTATIC HYPERPLASIA WITHOUT LOWER URINARY TRACT SYMPTOMS: ICD-10-CM

## 2019-04-25 DIAGNOSIS — K30 INDIGESTION: ICD-10-CM

## 2019-04-25 DIAGNOSIS — D75.1 POLYCYTHEMIA: ICD-10-CM

## 2019-04-25 PROCEDURE — 99214 OFFICE O/P EST MOD 30 MIN: CPT | Performed by: NURSE PRACTITIONER

## 2019-04-25 RX ORDER — FINASTERIDE 5 MG/1
5 TABLET, FILM COATED ORAL DAILY
Qty: 90 TAB | Refills: 3 | Status: SHIPPED | OUTPATIENT
Start: 2019-04-25 | End: 2020-03-02 | Stop reason: SDUPTHER

## 2019-04-25 RX ORDER — OMEPRAZOLE 20 MG/1
20 CAPSULE, DELAYED RELEASE ORAL DAILY
Qty: 30 CAP | Refills: 0 | Status: SHIPPED | OUTPATIENT
Start: 2019-04-25 | End: 2019-05-10 | Stop reason: SDUPTHER

## 2019-04-25 NOTE — ASSESSMENT & PLAN NOTE
Persistent feeling of indigestion which he feels is triggered/ exacerbated by medication regimen.States that 30 to 45 minutes after taking his Hydrea and Plaquenil he develops abdominal pain, cramping, feels bloated, gassy. Has been taking several tums daily which slight relief but not resolution.  He had discussed this with Dr. Pisano who suggested follow-up with primary care and consideration of PPI.  Denies any significant heartburn.  He was on long-term prednisone which is now discontinued.  He is also on anticoagulation therapy.  He denies any change in bowel movements including dark or tarry stools, diarrhea

## 2019-04-25 NOTE — PROGRESS NOTES
Subjective:     Chief Complaint   Patient presents with   • GI Problem     Francesco Schulte is a 68 y.o. male established patient with a complex medical history, he is followed by multiple specialists both at [facility] and here locally.  Since his last visit he has had a skin biopsy which was suggestive of lupus, he has been started on Plaquenil for this.  His main concern today is indigestion as detailed below.  We discussed:  Polycythemia  Managed with hydrea, feels his GI issues started around the same time he initiated this medication.  Followed Sims    Indigestion  Persistent feeling of indigestion which he feels is triggered/ exacerbated by medication regimen.States that 30 to 45 minutes after taking his Hydrea and Plaquenil he develops abdominal pain, cramping, feels bloated, gassy. Has been taking several tums daily which slight relief but not resolution.  He had discussed this with Dr. Pisano who suggested follow-up with primary care and consideration of PPI.  Denies any significant heartburn.  He was on long-term prednisone which is now discontinued.  He is also on anticoagulation therapy.  He denies any change in bowel movements including dark or tarry stools, diarrhea    Paroxysmal atrial fibrillation (HCC)  Asymptomatic, on anticoagulation    Type 2 diabetes mellitus with complication, without long-term current use of insulin (HCC)  Notes improvement in home glucose readings since having discontinued prednisone, he has an upcoming appointment with endocrinology        Current medicines (including changes today)  Current Outpatient Prescriptions   Medication Sig Dispense Refill   • finasteride (PROSCAR) 5 MG Tab Take 1 Tab by mouth every day. 90 Tab 3   • omeprazole (PRILOSEC) 20 MG delayed-release capsule Take 1 Cap by mouth every day. 30 Cap 0   • tamsulosin (FLOMAX) 0.4 MG capsule Take 1 Cap by mouth every evening. 90 Cap 3   • hydroxychloroquine (PLAQUENIL) 200 MG Tab Take 2 Tabs by mouth  "every day. 60 Tab 2   • B-D INS SYRINGE 0.5CC/31GX5/16 31G X 5/16\" 0.5 ML Misc USE 1 SYRINGE EVERY DAY. 100 Each 2   • betamethasone dipropionate (DIPROLENE) 0.05 % Cream Apply 90 Applications to affected area(s) 2 times a day. To severe areas not responding to TAC 90 g 4   • glipiZIDE (GLUCOTROL) 10 MG Tab Take 1 Tab by mouth 2 times a day. 60 Tab 5   • triamcinolone acetonide (KENALOG) 0.1 % Cream Apply 1 Application to affected area(s) 2 times a day. 60 g 2   • ciclopirox (LOPROX) 0.77 % cream Twice daily to the affected area in the groin 1 Tube 1   • insulin glargine (LANTUS) 100 UNIT/ML Solution Inject 10 Units as instructed every evening as needed.     • diphenhydrAMINE-ZnAcetate (BENADRYL ITCH) 1-0.1 % Cream Apply thin layer over effected area 1 Tube 0   • apixaban (ELIQUIS) 5mg Tab Take 1 Tab by mouth 2 Times a Day. 180 Tab 3   • aspirin EC (ECOTRIN) 81 MG Tablet Delayed Response Take 1 Tab by mouth every day. 30 Tab    • therapeutic multivitamin-minerals (THERAGRAN-M) Tab Take 1 Tab by mouth every day.     • hydroxyurea (HYDREA) 500 MG Cap Take 1,000 mg by mouth every morning.     • colesevelam (WELCHOL) 625 MG Tab Take 625 mg by mouth 2 times a day, with meals.       No current facility-administered medications for this visit.      He  has a past medical history of Anesthesia; Cancer ([facility]); Diabetes ([facility]); Family history of punctured lung (2002); Heart attack ([facility]) (03/2017); Hemorrhagic disorder ([facility]); High cholesterol; Ischemic cardiomyopathy; Kidney stones; Orthostatic hypotension (3/29/2018); Pain; Polycythemia vera(238.4); Stroke ([facility]) (2016); Urinary bladder disorder; and Vertigo.    ROS included above     Objective:     /72 (BP Location: Left arm, Patient Position: Sitting, BP Cuff Size: Adult)   Pulse 93   Temp 36.1 °C (96.9 °F) (Temporal)   Resp 16   Ht 1.905 m (6' 3\")   Wt 98.9 kg (218 lb)   SpO2 96%  Body mass index is 27.25 kg/m².     Physical Exam:  General: Alert, oriented in no " acute distress.  Eye contact is good, speech is normal, affect calm  Lungs: clear to auscultation bilaterally, normal effort, no wheeze/ rhonchi/ rales.  CV: regular rate and rhythm, S1, S2, no murmur  Abdomen: soft, nontender, nondistended, negative Mirza sign  Ext: no edema, color normal, vascularity normal, temperature normal    Assessment and Plan:   The following treatment plan was discussed   1. Indigestion   GI difficulty with medications.  Feels that symptoms are directly related to both his Hydrea and Plaquenil medication.  Management options discussed.  Will start trial of omeprazole 20 mg daily, may need to increase to 40 mg depending on results.  Patient is also been on long-term corticosteroids, there is some risk for gastric ulcer.  I will refer him to gastroenterology for further evaluation  REFERRAL TO GASTROENTEROLOGY    omeprazole (PRILOSEC) 20 MG delayed-release capsule   2. Polycythemia   stable at this time, followed by oncology   3. Paroxysmal atrial fibrillation (HCC)   stable, asymptomatic   4. Benign prostatic hyperplasia without lower urinary tract symptoms   doing well on current medication, refill provided  finasteride (PROSCAR) 5 MG Tab   5. Type 2 diabetes mellitus with complication, without long-term current use of insulin (HCC)   some improvement in home glucose readings since having discontinued prednisone.  Patient will see endocrinology in the near future       Followup: Patient to contact me with update in 1 to 2 weeks         Please note that this dictation was created using voice recognition software. I have worked with consultants from the vendor as well as technical experts from [employer] [facility] to optimize the interface. I have made every reasonable attempt to correct obvious errors, but I expect that there are errors of grammar and possibly content that I did not discover before finalizing the note.

## 2019-04-25 NOTE — ASSESSMENT & PLAN NOTE
Notes improvement in home glucose readings since having discontinued prednisone, he has an upcoming appointment with endocrinology

## 2019-04-25 NOTE — ASSESSMENT & PLAN NOTE
Managed with hydrea, feels his GI issues started around the same time he initiated this medication.  Followed Levi

## 2019-05-06 ENCOUNTER — OFFICE VISIT (OUTPATIENT)
Dept: ENDOCRINOLOGY | Facility: MEDICAL CENTER | Age: 69
End: 2019-05-06
Payer: MEDICARE

## 2019-05-06 VITALS
HEIGHT: 75 IN | BODY MASS INDEX: 27.1 KG/M2 | OXYGEN SATURATION: 93 % | SYSTOLIC BLOOD PRESSURE: 110 MMHG | DIASTOLIC BLOOD PRESSURE: 60 MMHG | WEIGHT: 218 LBS | HEART RATE: 80 BPM

## 2019-05-06 DIAGNOSIS — Z79.4 ENCOUNTER FOR LONG-TERM (CURRENT) USE OF INSULIN (HCC): ICD-10-CM

## 2019-05-06 DIAGNOSIS — E11.8 TYPE 2 DIABETES MELLITUS WITH COMPLICATION, WITHOUT LONG-TERM CURRENT USE OF INSULIN (HCC): ICD-10-CM

## 2019-05-06 PROCEDURE — 99204 OFFICE O/P NEW MOD 45 MIN: CPT | Performed by: PHYSICIAN ASSISTANT

## 2019-05-06 NOTE — PROGRESS NOTES
New Patient Consult Note  Referred by: ALVINO Harman    Reason for consult: Diabetes Management Type 2    HPI:  Francesco Schulte is a 68 y.o. old patient who is seeing us today for diabetes care.  This is a pleasant patient with diabetes and I appreciate the opportunity to participate in the care of this patient.  This is a new patient with me today.    Labs of 2/8/19 GFR >60, HbA1c is 8.8    BG Diary:5/6/2019  In the AM:  No log    Has been Diabetic since  T2 15+ years  Has a Glucagon pen at home: no    This patient has a complex HX with Agent Orange thrown in he states.     1. Type 2 diabetes mellitus with complication, without long-term current use of insulin (HCC)  This is a new patient with me on 5/6/2019  They are on:  1.  Lantus/ Humulin N  2.  Glipizide 10mg twice a day    STOP:  1.  Lantus/ Humulin N  2.  Glipizide 10mg twice a day    START:  1.  Ozempic 0.25 once a week for 3 weeks then INCREASE to 0.5 for three weeks then increase to 1.0. Ozempic can cause nausea and upset stomach feelings but this generally only lasts a day or two.     2.  Jardiance 25mg one a day    ROS:   Constitutional: No change in weight , No fatigue, No night sweats.  HEENT: No Headache.  Eyes:  No blurred vision, No visual changes.  Cardiac: No chest pain, No palpitations.  Resp: No shortness of breath, No cough,   Gastro: No nausea or vomiting, No diarrhea.  Neuro: Denies numbness or tinging in bilateral feet or hands, and no loss of sensation.  Endo: No heat or cold intolerance.  : No polyuria, No polydipsia, No chronic UTI's.  Lower extremities: No lower leg edema bilateral.  All other systems were reviewed and were negative.    Past Medical History:  Patient Active Problem List    Diagnosis Date Noted   • Vertigo 08/09/2018     Priority: High   • Stenosis of right carotid artery-Right CEA 3/21/18 03/21/2018     Priority: High   • Hypercoagulable state (HCC) 03/21/2018     Priority: High   • Dizziness  03/10/2018     Priority: High   • History of ST elevation myocardial infarction (STEMI) 03/17/2017     Priority: High   • Coagulopathy ([facility]) 03/13/2017     Priority: High   • Thrombocytosis ([facility]) 03/12/2017     Priority: High   • STEMI (ST elevation myocardial infarction) ([facility]) 03/11/2017     Priority: High   • Chronic headache 03/21/2018     Priority: Medium   • History of stroke- old right parietal/occipital 03/10/2018     Priority: Medium   • Thrombocytopenia ([facility]) 03/10/2018     Priority: Medium   • Leukopenia 03/10/2018     Priority: Medium   • Hypokalemia 03/10/2018     Priority: Medium   • Carotid stenosis, 90% right carotid 03/10/2018     Priority: Medium   • Chronic systolic CHF (congestive heart failure) ([facility]) 03/13/2017     Priority: Medium   • Type 2 diabetes mellitus with complication, without long-term current use of insulin ([facility]) 03/13/2017     Priority: Medium   • CAD (coronary artery disease) 03/13/2017     Priority: Medium   • BPH (benign prostatic hyperplasia) 03/12/2017     Priority: Medium   • Paroxysmal atrial fibrillation ([facility]) 03/12/2017     Priority: Medium   • Polycythemia 12/01/2016     Priority: Medium   • Chronic pain syndrome 09/13/2017     Priority: Low   • SIRS (systemic inflammatory response syndrome) ([facility]) 03/16/2017     Priority: Low   • Dyslipidemia 03/12/2017     Priority: Low   • Encounter for long-term (current) use of insulin ([facility]) 05/06/2019   • Indigestion 04/25/2019   • Polycythemia vera ([facility]) 01/03/2019   • Multiple joint pain 10/03/2018   • Nausea and vomiting 08/09/2018   • Elevated sed rate- R/O PMR 04/04/2018   • New daily persistent headache- R/O PMR; trial steroids 04/04/2018   • Orthostatic hypotension 03/29/2018   • Chronic daily headache 01/09/2018   • Agent orange exposure 09/13/2017   • Risk for falls 09/13/2017   • Long term (current) use of anticoagulants [Z79.01] 08/21/2017   • Diabetes ([facility]) 03/12/2017   • Statin intolerance 03/12/2017       Past Surgical  "History:  Past Surgical History:   Procedure Laterality Date   • TEMPORAL ARTERY BIOPSY Right 6/26/2018    Procedure: TEMPORAL ARTERY BIOPSY;  Surgeon: Rajendra Aguilar M.D.;  Location: SURGERY SAME DAY [facility] ORS;  Service: General   • CAROTID ENDARTERECTOMY Right 3/21/2018    Procedure: CAROTID ENDARTERECTOMY- W/EEG MONITORING;  Surgeon: Benny Morfin M.D.;  Location: SURGERY [facility];  Service: General   • APPENDECTOMY     • CATH PLACEMENT      right arm   • LAMINOTOMY      diskectomy; C4   • OTHER CARDIAC SURGERY      stents x 3       Allergies:  Beta adrenergic blockers; Metformin; Simvastatin; and Statins [hmg-coa-r inhibitors]    Social History:  Social History     Social History   • Marital status:      Spouse name: N/A   • Number of children: N/A   • Years of education: N/A     Occupational History   • Not on file.     Social History Main Topics   • Smoking status: Never Smoker   • Smokeless tobacco: Never Used   • Alcohol use No   • Drug use: No   • Sexual activity: Not on file     Other Topics Concern   • Not on file     Social History Narrative   • No narrative on file       Family History:  History reviewed. No pertinent family history.    Medications:    Current Outpatient Prescriptions:   •  finasteride (PROSCAR) 5 MG Tab, Take 1 Tab by mouth every day., Disp: 90 Tab, Rfl: 3  •  omeprazole (PRILOSEC) 20 MG delayed-release capsule, Take 1 Cap by mouth every day., Disp: 30 Cap, Rfl: 0  •  tamsulosin (FLOMAX) 0.4 MG capsule, Take 1 Cap by mouth every evening., Disp: 90 Cap, Rfl: 3  •  hydroxychloroquine (PLAQUENIL) 200 MG Tab, Take 2 Tabs by mouth every day., Disp: 60 Tab, Rfl: 2  •  B-D INS SYRINGE 0.5CC/31GX5/16 31G X 5/16\" 0.5 ML Misc, USE 1 SYRINGE EVERY DAY., Disp: 100 Each, Rfl: 2  •  betamethasone dipropionate (DIPROLENE) 0.05 % Cream, Apply 90 Applications to affected area(s) 2 times a day. To severe areas not responding to TAC, Disp: 90 g, Rfl: 4  •  triamcinolone acetonide " "(KENALOG) 0.1 % Cream, Apply 1 Application to affected area(s) 2 times a day., Disp: 60 g, Rfl: 2  •  ciclopirox (LOPROX) 0.77 % cream, Twice daily to the affected area in the groin, Disp: 1 Tube, Rfl: 1  •  diphenhydrAMINE-ZnAcetate (BENADRYL ITCH) 1-0.1 % Cream, Apply thin layer over effected area, Disp: 1 Tube, Rfl: 0  •  apixaban (ELIQUIS) 5mg Tab, Take 1 Tab by mouth 2 Times a Day., Disp: 180 Tab, Rfl: 3  •  aspirin EC (ECOTRIN) 81 MG Tablet Delayed Response, Take 1 Tab by mouth every day., Disp: 30 Tab, Rfl:   •  therapeutic multivitamin-minerals (THERAGRAN-M) Tab, Take 1 Tab by mouth every day., Disp: , Rfl:   •  hydroxyurea (HYDREA) 500 MG Cap, Take 1,000 mg by mouth every morning., Disp: , Rfl:   •  colesevelam (WELCHOL) 625 MG Tab, Take 625 mg by mouth 2 times a day, with meals., Disp: , Rfl:       Physical Examination:   Vital signs: /60 (BP Location: Left arm, Patient Position: Sitting)   Pulse 80   Ht 1.905 m (6' 3\")   Wt 98.9 kg (218 lb)   SpO2 93%   BMI 27.25 kg/m²   General: No distress, cooperative, well dressed and well nourished.   Eyes: No scleral icterus or discharge, No hyposphagma  ENMT: Normal on external inspection of nose, lips, No nasal drainage   Neck: No abnormal masses on inspection  Resp: Normal effort, Bilateral clear to auscultation, No wheezing, No rales  CVS: Regular rate and rhythm, S1 S2 normal, No murmur. No gallop  Extremities: No edema bilateral extremities  Neuro: Alert and oriented  Skin: No rash, No Ulcers  Psych: Normal mood and affect    Assessment and Plan:    1. Type 2 diabetes mellitus with complication, without long-term current use of insulin (HCC)    START:  1.  Ozempic 0.25 once a week for 3 weeks then INCREASE to 0.5 for three weeks then increase to 1.0. Ozempic can cause nausea and upset stomach feelings but this generally only lasts a day or two.     2.  Jardiance 25mg one a day    Return in about 6 weeks (around 6/17/2019).    This patient during " there office visit was started on new medication Ozempic and Jardiance.  Side effects of new medications were discussed with the patient today in the office. The patient was supplied paperwork on this new medication.    Thank you kindly for allowing me to participate in the diabetes care plan for this patient.    Naveed Mesa PA-C, BC-[facility]  Board Certified - Advanced Diabetes Management  05/06/19    CC:   ALVINO Harman

## 2019-05-06 NOTE — PATIENT INSTRUCTIONS
Blood glucose log: Check BG in the morning when wake up,  and before bed.  So two times a day.  Always bring BG diary to the next office visit.     STOP:  1.  Lantus/ Humulin N  2.  Glipizide 10mg twice a day    START:  1.  Ozempic 0.25 once a week for 3 weeks then INCREASE to 0.5 for three weeks then increase to 1.0. Ozempic can cause nausea and upset stomach feelings but this generally only lasts a day or two.     2.  Jardiance 25mg one a day

## 2019-05-10 DIAGNOSIS — K30 INDIGESTION: ICD-10-CM

## 2019-05-10 RX ORDER — OMEPRAZOLE 20 MG/1
CAPSULE, DELAYED RELEASE ORAL
Qty: 90 CAP | Refills: 1 | Status: SHIPPED | OUTPATIENT
Start: 2019-05-10 | End: 2019-07-29

## 2019-05-12 RX ORDER — HYDROXYCHLOROQUINE SULFATE 200 MG/1
TABLET, FILM COATED ORAL
Qty: 60 TAB | Refills: 2 | Status: SHIPPED | OUTPATIENT
Start: 2019-05-12 | End: 2019-07-12 | Stop reason: SDUPTHER

## 2019-05-14 ENCOUNTER — TELEPHONE (OUTPATIENT)
Dept: DERMATOLOGY | Facility: IMAGING CENTER | Age: 69
End: 2019-05-14

## 2019-05-14 NOTE — TELEPHONE ENCOUNTER
Patient is going on vacation x 1 month and is requesting an early refill on medication plaquenil . Pharmacy would need to over ride refill

## 2019-06-18 ENCOUNTER — OFFICE VISIT (OUTPATIENT)
Dept: ENDOCRINOLOGY | Facility: MEDICAL CENTER | Age: 69
End: 2019-06-18
Payer: MEDICARE

## 2019-06-18 VITALS
HEART RATE: 82 BPM | BODY MASS INDEX: 25.49 KG/M2 | OXYGEN SATURATION: 99 % | HEIGHT: 75 IN | SYSTOLIC BLOOD PRESSURE: 106 MMHG | WEIGHT: 205 LBS | DIASTOLIC BLOOD PRESSURE: 60 MMHG

## 2019-06-18 DIAGNOSIS — Z79.4 ENCOUNTER FOR LONG-TERM (CURRENT) USE OF INSULIN (HCC): ICD-10-CM

## 2019-06-18 DIAGNOSIS — E11.8 TYPE 2 DIABETES MELLITUS WITH COMPLICATION, WITHOUT LONG-TERM CURRENT USE OF INSULIN (HCC): ICD-10-CM

## 2019-06-18 LAB
HBA1C MFR BLD: 8.3 % (ref 0–5.6)
INT CON NEG: NEGATIVE
INT CON POS: POSITIVE

## 2019-06-18 PROCEDURE — 83036 HEMOGLOBIN GLYCOSYLATED A1C: CPT | Performed by: PHYSICIAN ASSISTANT

## 2019-06-18 PROCEDURE — 99214 OFFICE O/P EST MOD 30 MIN: CPT | Performed by: PHYSICIAN ASSISTANT

## 2019-06-18 RX ORDER — EMPAGLIFLOZIN 25 MG/1
1 TABLET, FILM COATED ORAL DAILY
Qty: 30 TAB | Refills: 3 | COMMUNITY
Start: 2019-06-18 | End: 2020-01-07 | Stop reason: SDUPTHER

## 2019-06-18 NOTE — PATIENT INSTRUCTIONS
START:  1.  Ozempic 0.5 once a week (INCREASE to 1.0)  2.  Jardiance 25mg one a day  3.  Tresiba 10 units at night before bed

## 2019-06-18 NOTE — PROGRESS NOTES
Return to office Patient Consult Note  Referred by: ALVINO Harman    Reason for consult: Diabetes Management Type 2    HPI:  Francesco Schulte is a 68 y.o. old patient who is seeing us today for diabetes care.  This is a pleasant patient with diabetes and I appreciate the opportunity to participate in the care of this patient.    Labs of 6/18/2019 HbA1c is 8.5  Labs of 2/8/19 GFR >60, HbA1c is 8.8    BG Diary:6/18/2019  In the AM:  No log      This patient has a complex HX with Agent Orange thrown in he states.     1. Type 2 diabetes mellitus with complication, without long-term current use of insulin (HCC)  This is a new patient with me on 5/6/2019  They are on:  1.  Lantus/ Humulin N  2.  Glipizide 10mg twice a day     STOP:  1.  Lantus/ Humulin N  2.  Glipizide 10mg twice a day     START:  1.  Ozempic 0.5 once a week   2.  Jardiance 25mg one a day    2. Encounter for long-term (current) use of insulin ([facility])  He is now currently off insulin      ROS:   Constitutional: No night sweats.  Eyes:  No visual changes.  Cardiac: No chest pain, No palpitations or racing heart rate.  Resp: No shortness of breath, No cough,   Gi: No Diarrhea    All other systems were reviewed and were/are negative.     Past Medical History:  Patient Active Problem List    Diagnosis Date Noted   • Vertigo 08/09/2018     Priority: High   • Stenosis of right carotid artery-Right CEA 3/21/18 03/21/2018     Priority: High   • Hypercoagulable state ([facility]) 03/21/2018     Priority: High   • Dizziness 03/10/2018     Priority: High   • History of ST elevation myocardial infarction (STEMI) 03/17/2017     Priority: High   • Coagulopathy ([facility]) 03/13/2017     Priority: High   • Thrombocytosis ([facility]) 03/12/2017     Priority: High   • STEMI (ST elevation myocardial infarction) ([facility]) 03/11/2017     Priority: High   • Chronic headache 03/21/2018     Priority: Medium   • History of stroke- old right parietal/occipital 03/10/2018     Priority:  Medium   • Thrombocytopenia ([facility]) 03/10/2018     Priority: Medium   • Leukopenia 03/10/2018     Priority: Medium   • Hypokalemia 03/10/2018     Priority: Medium   • Carotid stenosis, 90% right carotid 03/10/2018     Priority: Medium   • Chronic systolic CHF (congestive heart failure) ([facility]) 03/13/2017     Priority: Medium   • Type 2 diabetes mellitus with complication, without long-term current use of insulin ([facility]) 03/13/2017     Priority: Medium   • CAD (coronary artery disease) 03/13/2017     Priority: Medium   • BPH (benign prostatic hyperplasia) 03/12/2017     Priority: Medium   • Paroxysmal atrial fibrillation ([facility]) 03/12/2017     Priority: Medium   • Polycythemia 12/01/2016     Priority: Medium   • Chronic pain syndrome 09/13/2017     Priority: Low   • SIRS (systemic inflammatory response syndrome) ([facility]) 03/16/2017     Priority: Low   • Dyslipidemia 03/12/2017     Priority: Low   • Encounter for long-term (current) use of insulin ([facility]) 05/06/2019   • Indigestion 04/25/2019   • Polycythemia vera ([facility]) 01/03/2019   • Multiple joint pain 10/03/2018   • Nausea and vomiting 08/09/2018   • Elevated sed rate- R/O PMR 04/04/2018   • New daily persistent headache- R/O PMR; trial steroids 04/04/2018   • Orthostatic hypotension 03/29/2018   • Chronic daily headache 01/09/2018   • Agent orange exposure 09/13/2017   • Risk for falls 09/13/2017   • Long term (current) use of anticoagulants [Z79.01] 08/21/2017   • Diabetes ([facility]) 03/12/2017   • Statin intolerance 03/12/2017       Past Surgical History:  Past Surgical History:   Procedure Laterality Date   • TEMPORAL ARTERY BIOPSY Right 6/26/2018    Procedure: TEMPORAL ARTERY BIOPSY;  Surgeon: Rajendra Aguilar M.D.;  Location: SURGERY SAME DAY [facility] ORS;  Service: General   • CAROTID ENDARTERECTOMY Right 3/21/2018    Procedure: CAROTID ENDARTERECTOMY- W/EEG MONITORING;  Surgeon: Benny Morfin M.D.;  Location: SURGERY [facility] ORS;  Service: General   • APPENDECTOMY     •  "CATH PLACEMENT      right arm   • LAMINOTOMY      diskectomy; C4   • OTHER CARDIAC SURGERY      stents x 3       Allergies:  Beta adrenergic blockers; Metformin; Simvastatin; and Statins [hmg-coa-r inhibitors]    Social History:  Social History     Social History   • Marital status:      Spouse name: N/A   • Number of children: N/A   • Years of education: N/A     Occupational History   • Not on file.     Social History Main Topics   • Smoking status: Never Smoker   • Smokeless tobacco: Never Used   • Alcohol use No   • Drug use: No   • Sexual activity: Not on file     Other Topics Concern   • Not on file     Social History Narrative   • No narrative on file       Family History:  History reviewed. No pertinent family history.    Medications:    Current Outpatient Prescriptions:   •  Semaglutide (OZEMPIC) 1 MG/DOSE Solution Pen-injector, Inject 1 mg as instructed every 7 days., Disp: 2 PEN, Rfl: 11  •  JARDIANCE 25 MG Tab, Take 1 tablet by mouth every day., Disp: 30 Tab, Rfl: 3  •  hydroxychloroquine (PLAQUENIL) 200 MG Tab, TAKE 2 TABLETS BY MOUTH EVERY DAY, Disp: 60 Tab, Rfl: 2  •  omeprazole (PRILOSEC) 20 MG delayed-release capsule, TAKE 1 CAPSULE BY MOUTH EVERY DAY, Disp: 90 Cap, Rfl: 1  •  finasteride (PROSCAR) 5 MG Tab, Take 1 Tab by mouth every day., Disp: 90 Tab, Rfl: 3  •  tamsulosin (FLOMAX) 0.4 MG capsule, Take 1 Cap by mouth every evening., Disp: 90 Cap, Rfl: 3  •  B-D INS SYRINGE 0.5CC/31GX5/16 31G X 5/16\" 0.5 ML Misc, USE 1 SYRINGE EVERY DAY., Disp: 100 Each, Rfl: 2  •  betamethasone dipropionate (DIPROLENE) 0.05 % Cream, Apply 90 Applications to affected area(s) 2 times a day. To severe areas not responding to TAC, Disp: 90 g, Rfl: 4  •  triamcinolone acetonide (KENALOG) 0.1 % Cream, Apply 1 Application to affected area(s) 2 times a day., Disp: 60 g, Rfl: 2  •  ciclopirox (LOPROX) 0.77 % cream, Twice daily to the affected area in the groin, Disp: 1 Tube, Rfl: 1  •  diphenhydrAMINE-ZnAcetate " "(BENADRYL ITCH) 1-0.1 % Cream, Apply thin layer over effected area, Disp: 1 Tube, Rfl: 0  •  apixaban (ELIQUIS) 5mg Tab, Take 1 Tab by mouth 2 Times a Day., Disp: 180 Tab, Rfl: 3  •  aspirin EC (ECOTRIN) 81 MG Tablet Delayed Response, Take 1 Tab by mouth every day., Disp: 30 Tab, Rfl:   •  therapeutic multivitamin-minerals (THERAGRAN-M) Tab, Take 1 Tab by mouth every day., Disp: , Rfl:   •  hydroxyurea (HYDREA) 500 MG Cap, Take 1,000 mg by mouth every morning., Disp: , Rfl:   •  colesevelam (WELCHOL) 625 MG Tab, Take 625 mg by mouth 2 times a day, with meals., Disp: , Rfl:         Physical Examination:   Vital signs: /60 (BP Location: Left arm, Patient Position: Sitting)   Pulse 82   Ht 1.905 m (6' 3\")   Wt 93 kg (205 lb)   SpO2 99%   BMI 25.62 kg/m²   General: No distress, cooperative, well dressed and well nourished.   Eyes: No scleral icterus or discharge, No hyposphagma  ENMT: Normal on external inspection of nose, lips, No nasal drainage   Neck: No abnormal masses on inspection  Resp: Normal effort, Bilateral clear to auscultation, No wheezing, No rales  CVS: Regular rate and rhythm, S1 S2 normal, No murmur. No gallop  Extremities: No edema bilateral extremities  Neuro: Alert and oriented  Skin: No rash, No Ulcers  Psych: Normal mood and affect      Assessment and Plan:    1. Type 2 diabetes mellitus with complication, without long-term current use of insulin (HCC)    START:  1.  Ozempic 0.5 once a week (INCREASE to 1.0)  2.  Jardiance 25mg one a day  3.  Tresiba 10 units at night before bed    2. Encounter for long-term (current) use of insulin (HCC)  Is on a high risk medication Insulin and we will continue to follow       Return in about 6 weeks (around 7/30/2019).      Thank you kindly for allowing me to participate in the diabetes care plan for this patient.    Naveed Mesa PA-C, BC-[facility]  Board Certified - Advanced Diabetes Management  06/18/19    CC:   CONNER Harman.    "

## 2019-06-28 ENCOUNTER — OFFICE VISIT (OUTPATIENT)
Dept: CARDIOLOGY | Facility: MEDICAL CENTER | Age: 69
End: 2019-06-28
Payer: MEDICARE

## 2019-06-28 VITALS
SYSTOLIC BLOOD PRESSURE: 110 MMHG | OXYGEN SATURATION: 96 % | BODY MASS INDEX: 24.74 KG/M2 | DIASTOLIC BLOOD PRESSURE: 66 MMHG | HEART RATE: 80 BPM | HEIGHT: 75 IN | WEIGHT: 199 LBS

## 2019-06-28 DIAGNOSIS — I50.22 CHRONIC SYSTOLIC CHF (CONGESTIVE HEART FAILURE) (HCC): ICD-10-CM

## 2019-06-28 DIAGNOSIS — R42 DIZZINESS: ICD-10-CM

## 2019-06-28 DIAGNOSIS — I95.1 ORTHOSTATIC HYPOTENSION: ICD-10-CM

## 2019-06-28 DIAGNOSIS — Z79.899 ENCOUNTER FOR LONG-TERM (CURRENT) USE OF HIGH-RISK MEDICATION: ICD-10-CM

## 2019-06-28 DIAGNOSIS — I25.10 CORONARY ARTERY DISEASE INVOLVING NATIVE CORONARY ARTERY OF NATIVE HEART WITHOUT ANGINA PECTORIS: ICD-10-CM

## 2019-06-28 DIAGNOSIS — I48.0 PAROXYSMAL ATRIAL FIBRILLATION (HCC): ICD-10-CM

## 2019-06-28 PROCEDURE — 99215 OFFICE O/P EST HI 40 MIN: CPT | Performed by: INTERNAL MEDICINE

## 2019-06-28 RX ORDER — MIDODRINE HYDROCHLORIDE 2.5 MG/1
2.5 TABLET ORAL 2 TIMES DAILY
Qty: 60 TAB | Refills: 5 | Status: SHIPPED | OUTPATIENT
Start: 2019-06-28 | End: 2019-07-22 | Stop reason: SDUPTHER

## 2019-06-28 NOTE — LETTER
[facility]   [address]   [address]  [place] [address]  Phone: [phone]  Fax: [phone]              Francesco Schulte  1950    Encounter Date: 6/28/2019    Liliana Song M.D.          PROGRESS NOTE:  Chief Complaint   Patient presents with   • Atrial Fibrillation       Subjective:   Francesco Schulte is a 68 y.o. male who presents today to follow-up on his coronary artery disease and atrial fibrillation.    Pertinent history:  Coronary artery disease  2008 - PCI to the LAD, POBA to the diagonal.   2014 - anterior STEMI. POBA to the diagonal. PCI to the proximal circumflex.  Restented again in 2014 after his Plavix was stopped for an injection and patient has recurrent MI.  2016 - recurrent MI. POBA to unknown vessel  2017 - anterior STEMI. Status post PCI to the proximal and mid LAD    Ischemic cardiomyopathy, ejection fraction 40%  Paroxysmal atrial fibrillation, diagnosed 2016. Intolerant to beta blockers  TIA vs. CVA in 2016  Polycythemia vera, essential thrombocytosis  Carotid stenosis status post right CEA in March 2018  Hypertension  Hyperlipidemia, intolerant to statins      Patient's main concern today is dizziness that he has had with any postural change.  He reports that his blood pressure with standing has been 100s systolic or lower and he often gets dizzy and almost passes out.  He has been drinking 8 to 10 cups of coffee to help increase his blood pressure.    He denies any palpitations.  He is on Eliquis.  Denies any bleeding issues.    For his hyperlipidemia he is on WelChol.    He denies any recurrent anginal symptoms.  He denies any heart failure symptoms.    Past Medical History:   Diagnosis Date   • Anesthesia     resistant to pain meds   • Cancer (HCC)     polycythemia vera   • Diabetes (HCC)     insulin and oral meds   • Family history of punctured lung 2002    left lung   • Heart attack (HCC) 03/2017    "  • Hemorrhagic disorder (HCC)     on blood thinners   • High cholesterol    • Ischemic cardiomyopathy    • Kidney stones     hx of kidney stones   • Orthostatic hypotension 3/29/2018   • Pain     headache pain   • Polycythemia vera(238.4)    • Stroke (HCC) 2016    r/t afib; eye issues resolved afterward   • Urinary bladder disorder     enlarged prostate, weak stream, difficulty urinating   • Vertigo      Past Surgical History:   Procedure Laterality Date   • TEMPORAL ARTERY BIOPSY Right 6/26/2018    Procedure: TEMPORAL ARTERY BIOPSY;  Surgeon: Rajendra Aguilar M.D.;  Location: SURGERY SAME DAY [facility] ORS;  Service: General   • CAROTID ENDARTERECTOMY Right 3/21/2018    Procedure: CAROTID ENDARTERECTOMY- W/EEG MONITORING;  Surgeon: Benny Morfin M.D.;  Location: SURGERY [facility];  Service: General   • APPENDECTOMY     • CATH PLACEMENT      right arm   • LAMINOTOMY      diskectomy; C4   • OTHER CARDIAC SURGERY      stents x 3     History reviewed. No pertinent family history.  Social History     Social History   • Marital status:      Spouse name: N/A   • Number of children: N/A   • Years of education: N/A     Occupational History   • Not on file.     Social History Main Topics   • Smoking status: Never Smoker   • Smokeless tobacco: Never Used   • Alcohol use No   • Drug use: No   • Sexual activity: Not on file     Other Topics Concern   • Not on file     Social History Narrative   • No narrative on file     Allergies   Allergen Reactions   • Beta Adrenergic Blockers Unspecified     dizziness   • Metformin Unspecified and Palpitations     Cardiac effects  \"Similar to a heart attack, I was hospitalized\"   • Simvastatin      Other reaction(s): Other (Specify with Comments)  Myalgias  Myalgias   • Statins [Hmg-Coa-R Inhibitors]      Muscle ache, joint pain     Outpatient Encounter Prescriptions as of 6/28/2019   Medication Sig Dispense Refill   • midodrine (PROAMATINE) 2.5 MG Tab Take 1 Tab by mouth 2 " "times a day. 60 Tab 5   • Semaglutide (OZEMPIC) 1 MG/DOSE Solution Pen-injector Inject 1 mg as instructed every 7 days. 2 PEN 11   • JARDIANCE 25 MG Tab Take 1 tablet by mouth every day. 30 Tab 3   • hydroxychloroquine (PLAQUENIL) 200 MG Tab TAKE 2 TABLETS BY MOUTH EVERY DAY 60 Tab 2   • omeprazole (PRILOSEC) 20 MG delayed-release capsule TAKE 1 CAPSULE BY MOUTH EVERY DAY 90 Cap 1   • finasteride (PROSCAR) 5 MG Tab Take 1 Tab by mouth every day. 90 Tab 3   • tamsulosin (FLOMAX) 0.4 MG capsule Take 1 Cap by mouth every evening. 90 Cap 3   • B-D INS SYRINGE 0.5CC/31GX5/16 31G X 5/16\" 0.5 ML Misc USE 1 SYRINGE EVERY DAY. 100 Each 2   • betamethasone dipropionate (DIPROLENE) 0.05 % Cream Apply 90 Applications to affected area(s) 2 times a day. To severe areas not responding to TAC 90 g 4   • triamcinolone acetonide (KENALOG) 0.1 % Cream Apply 1 Application to affected area(s) 2 times a day. 60 g 2   • ciclopirox (LOPROX) 0.77 % cream Twice daily to the affected area in the groin 1 Tube 1   • diphenhydrAMINE-ZnAcetate (BENADRYL ITCH) 1-0.1 % Cream Apply thin layer over effected area 1 Tube 0   • apixaban (ELIQUIS) 5mg Tab Take 1 Tab by mouth 2 Times a Day. 180 Tab 3   • aspirin EC (ECOTRIN) 81 MG Tablet Delayed Response Take 1 Tab by mouth every day. 30 Tab    • therapeutic multivitamin-minerals (THERAGRAN-M) Tab Take 1 Tab by mouth every day.     • hydroxyurea (HYDREA) 500 MG Cap Take 1,000 mg by mouth every morning.     • colesevelam (WELCHOL) 625 MG Tab Take 625 mg by mouth 2 times a day, with meals.       No facility-administered encounter medications on file as of 6/28/2019.      Review of Systems   Constitutional: Negative for malaise/fatigue.   Respiratory: Negative for shortness of breath.    Cardiovascular: Negative for chest pain, palpitations, orthopnea, leg swelling and PND.   Gastrointestinal: Negative for abdominal pain.   Musculoskeletal: Negative for falls.   Neurological: Positive for dizziness. Negative " "for loss of consciousness.   Psychiatric/Behavioral: Negative for depression.   All other systems reviewed and are negative.       Objective:   /66 (BP Location: Left arm, Patient Position: Sitting, BP Cuff Size: Adult)   Pulse 80   Ht 1.905 m (6' 3\")   Wt 90.3 kg (199 lb)   SpO2 96%   BMI 24.87 kg/m²      Physical Exam   Constitutional: He is oriented to person, place, and time. He appears well-developed and well-nourished. No distress.   HENT:   Head: Normocephalic and atraumatic.   Eyes: Conjunctivae are normal. No scleral icterus.   Neck: Normal range of motion. Neck supple.   Cardiovascular: Normal rate, regular rhythm and normal heart sounds.  Exam reveals no gallop and no friction rub.    No murmur heard.  Pulmonary/Chest: Effort normal and breath sounds normal. No respiratory distress. He has no wheezes. He has no rales.   Abdominal: Soft. He exhibits no distension. There is no tenderness.   Musculoskeletal: He exhibits no edema.   Neurological: He is alert and oriented to person, place, and time.   Skin: Skin is warm and dry. He is not diaphoretic.   Psychiatric: He has a normal mood and affect. His behavior is normal.   Nursing note and vitals reviewed.    Echocardiogram performed in September 2017 showed a mildly reduced LV function with an EF of 45%. No significant changes from prior study.     Echocardiogram performed in August 2018 showed a moderately reduced LV systolic function with ejection fraction of about 40%.  No major valvular pathology noted.  No significant changes from prior study.    Myocardial perfusion study performed January 2019 showed inferior infarct.  No reversible defects.    Echocardiogram performed January 2019 showed mildly reduced LV systolic function with ejection fraction of about 35-40%.  No significant changes from prior study  .  Assessment:     1. Coronary artery disease involving native coronary artery of native heart without angina pectoris     2. Orthostatic " hypotension     3. Paroxysmal atrial fibrillation (HCC)     4. Chronic systolic CHF (congestive heart failure) (HCC)     5. Dizziness     6. Encounter for long-term (current) use of high-risk medication         Medical Decision Making:  Today's Assessment / Status / Plan:     Dizziness:  Orthostatic hypotension:  Partial orthostatics were performed today and were unremarkable however patient has had a few month history of orthostatic hypotension.  He is off of all antihypertensives.  He is already tried increasing his salt and fluid intake and using compression stockings.  I will therefore start him on midodrine 2.5 mg once daily.  Patient understands that he is more likely to have supine hypertension with this medication.  Avoid Florinef given risk for worsening fluid overload.  Patient has been advised to avoid/minimize caffeine use.  He will be returning for repeat orthostatics in about 1 month.    Coronary artery disease:  Cardiomyopathy:  No anginal or heart failure symptoms.  Continue aspirin.  Patient is intolerant to statins.  He does not have blood pressure room for initiation of any cardioprotective medications like beta blockers or ACE inhibitors.    Paroxysmal atrial fibrillation: Relatively asymptomatic.  Continue Eliquis.  No bleeding issues.     Return to clinic in 4 months or earlier if needed.    Thank you for allowing me to participate in the care of this patient. Please do not hesitate to contact me with any questions.    Liliana Song MD  Cardiologist  [facility]      PLEASE NOTE: This dictation was created using voice recognition software.           Nereyda Moore, ATUL.P.R.N.  [address] [facility]  [address]  [facility] [address]  VIA In Basket

## 2019-06-28 NOTE — PROGRESS NOTES
Subjective:   Francesco Schulte is a 68-year-old male presenting to clinic for follow-up on coronary disease.    Pertinent history:  Coronary artery disease  2008 - PCI to the LAD, POBA to the diagonal.   2014 - anterior STEMI. POBA to the diagonal. PCI to the proximal circumflex.  Restented again in 2014 after his Plavix was stopped for an injection and patient has recurrent MI.  2016 - recurrent MI. POBA to unknown vessel  2017 - anterior STEMI. Status post PCI to the proximal and mid LAD    Ischemic cardiomyopathy, ejection fraction 40%  Paroxysmal atrial fibrillation, diagnosed 2016. Intolerant to beta blockers  TIA vs. CVA in 2016  Polycythemia vera, essential thrombocytosis  Carotid stenosis status post right CEA in March 2018  Hypertension  Hyperlipidemia, intolerant to statins      In January 2019, patient was admitted to the hospital with chest discomfort at which time his workup was unremarkable.    Since then he has been referred to [facility] for management of his polycythemia vera.  He also has splenomegaly and may be undergoing a splenectomy in the future.  While he was at [facility] and in the ER recently for an ankle injury he was found to have intermittent atrial fibrillation.  He was advised to follow-up with us.  Patient is concerned about having recurrent atrial fibrillation and worries that he is going to have a stroke because he is in and out of atrial fibrillation.  He has multiple questions today about the above.    Overall he has been feeling well.  Not having any recent blood pressure issues.    Past Medical History:   Diagnosis Date   • Anesthesia     resistant to pain meds   • Cancer (HCC)     polycythemia vera   • Diabetes (HCC)     insulin and oral meds   • Family history of punctured lung 2002    left lung   • Heart attack (HCC) 03/2017   • Hemorrhagic disorder (HCC)     on blood thinners   • High cholesterol    • Ischemic cardiomyopathy    • Kidney stones     hx of kidney stones  "  • Orthostatic hypotension 3/29/2018   • Pain     headache pain   • Polycythemia vera(238.4)    • Stroke (HCC) 2016    r/t afib; eye issues resolved afterward   • Urinary bladder disorder     enlarged prostate, weak stream, difficulty urinating   • Vertigo      Past Surgical History:   Procedure Laterality Date   • TEMPORAL ARTERY BIOPSY Right 6/26/2018    Procedure: TEMPORAL ARTERY BIOPSY;  Surgeon: Rajendra Aguilar M.D.;  Location: SURGERY SAME DAY [facility] ORS;  Service: General   • CAROTID ENDARTERECTOMY Right 3/21/2018    Procedure: CAROTID ENDARTERECTOMY- W/EEG MONITORING;  Surgeon: Benny Morfin M.D.;  Location: SURGERY [facility] ORS;  Service: General   • APPENDECTOMY     • CATH PLACEMENT      right arm   • LAMINOTOMY      diskectomy; C4   • OTHER CARDIAC SURGERY      stents x 3     No family history on file.     History   Smoking Status   • Never Smoker   Smokeless Tobacco   • Never Used     Allergies   Allergen Reactions   • Beta Adrenergic Blockers Unspecified     dizziness   • Metformin Unspecified and Palpitations     Cardiac effects  \"Similar to a heart attack, I was hospitalized\"   • Simvastatin      Other reaction(s): Other (Specify with Comments)  Myalgias  Myalgias   • Statins [Hmg-Coa-R Inhibitors]      Muscle ache, joint pain     Outpatient Encounter Prescriptions as of 6/28/2019   Medication Sig Dispense Refill   • Semaglutide (OZEMPIC) 1 MG/DOSE Solution Pen-injector Inject 1 mg as instructed every 7 days. 2 PEN 11   • JARDIANCE 25 MG Tab Take 1 tablet by mouth every day. 30 Tab 3   • hydroxychloroquine (PLAQUENIL) 200 MG Tab TAKE 2 TABLETS BY MOUTH EVERY DAY 60 Tab 2   • omeprazole (PRILOSEC) 20 MG delayed-release capsule TAKE 1 CAPSULE BY MOUTH EVERY DAY 90 Cap 1   • finasteride (PROSCAR) 5 MG Tab Take 1 Tab by mouth every day. 90 Tab 3   • tamsulosin (FLOMAX) 0.4 MG capsule Take 1 Cap by mouth every evening. 90 Cap 3   • B-D INS SYRINGE 0.5CC/31GX5/16 31G X 5/16\" 0.5 ML Misc USE 1 SYRINGE " "EVERY DAY. 100 Each 2   • betamethasone dipropionate (DIPROLENE) 0.05 % Cream Apply 90 Applications to affected area(s) 2 times a day. To severe areas not responding to TAC 90 g 4   • triamcinolone acetonide (KENALOG) 0.1 % Cream Apply 1 Application to affected area(s) 2 times a day. 60 g 2   • ciclopirox (LOPROX) 0.77 % cream Twice daily to the affected area in the groin 1 Tube 1   • diphenhydrAMINE-ZnAcetate (BENADRYL ITCH) 1-0.1 % Cream Apply thin layer over effected area 1 Tube 0   • apixaban (ELIQUIS) 5mg Tab Take 1 Tab by mouth 2 Times a Day. 180 Tab 3   • aspirin EC (ECOTRIN) 81 MG Tablet Delayed Response Take 1 Tab by mouth every day. 30 Tab    • therapeutic multivitamin-minerals (THERAGRAN-M) Tab Take 1 Tab by mouth every day.     • hydroxyurea (HYDREA) 500 MG Cap Take 1,000 mg by mouth every morning.     • colesevelam (WELCHOL) 625 MG Tab Take 625 mg by mouth 2 times a day, with meals.       No facility-administered encounter medications on file as of 6/28/2019.      ROS     Objective:   /66 (BP Location: Left arm, Patient Position: Sitting, BP Cuff Size: Adult)   Pulse 80   Ht 1.905 m (6' 3\")   Wt 90.3 kg (199 lb)   SpO2 96%   BMI 24.87 kg/m²     Physical Exam   Echocardiogram performed in September 2017 showed a mildly reduced LV function with an EF of 45%. No significant changes from prior study.     Echocardiogram performed in August 2018 showed a moderately reduced LV systolic function with ejection fraction of about 40%.  No major valvular pathology noted.  No significant changes from prior study.    Myocardial perfusion study performed January 2019 was personally reviewed and per my interpretation showed inferior infarct.  No reversible defects.    Echocardiogram performed January 2019 was personally reviewed and per my interpretation showed mildly reduced LV systolic function with ejection fraction of about 35-40%.  No significant changes from prior study.    Labs performed in February " 2019 were reviewed and showed normal creatinine.    Assessment:     No diagnosis found.    Medical Decision Making:  Today's Assessment / Status / Plan:     Patient has known paroxysmal atrial fibrillation.  I have explained to the patient the progression of atrial fibrillation.  It is not unexpected for him to have days when he has atrial fibrillation and days when he is sinus.  Patient was tearful today as one of his close friends recently had a stroke who had atrial fibrillation and was untreated.  He worries that if he continues to be in A. fib he would be at high risk for stroke as well.    Majority of the time was spent today discussing stroke risk in the setting of atrial fibrillation.  Patients with the same CHADS-Vasc score have a similar risk for stroke whether or not they are in paroxysmal or chronic atrial fibrillation.  Patient appeared to be relieved after hearing this information.      I did discuss rhythm control strategies with him which include ablation versus antiarrhythmics.  In terms of antiarrhythmic therapy, amiodarone is probably the best choice in this patient however given his comorbidities may not be ideal long-term given its side effects.  An ablation can be considered however given his polycythemia vera he is probably at a higher risk for stroke and clot formation.  I have discussed the above in detail with the patient and he would like to hold off on any changes in his medications or procedures at this time.  Overall he feels well and wanted to discuss the above because other physicians told him he should follow-up with us. Should he start experiencing any symptoms or change his mind, he will let us know.    He is also worried about having a splenectomy and worries about his risk for a cardiac event or a stroke with being off of anticoagulation.  I have reassured the patient that if he does in fact need a splenectomy, I will be providing risk stratification and will also discuss with  his oncologist, Dr. Sims to determine the best plan for his anticoagulation.    Total 40 minutes face-to-face time spent with patient, with greater than 50% of the total time discussing patient's issues and symptoms as listed above in assessment and plan, as well as managing coordination of care for future evaluation and treatment. Most of the time was spent discussing atrial fibrillation as detailed above.     Return to clinic in 4 months or earlier if needed.    Thank you for allowing me to participate in the care of this patient. Please do not hesitate to contact me with any questions.    Liliana Song MD  Cardiologist  [facility]    PLEASE NOTE: This dictation was created using voice recognition software.

## 2019-06-28 NOTE — PATIENT INSTRUCTIONS
Start taking midodrine 2.5 mg twice daily.  Do not take any extra pills if you keep having dizziness.  Instead please call us.  Please stop drinking excessive caffeine.

## 2019-06-30 ASSESSMENT — ENCOUNTER SYMPTOMS
ORTHOPNEA: 0
ABDOMINAL PAIN: 0
PALPITATIONS: 0
LOSS OF CONSCIOUSNESS: 0
PND: 0
DEPRESSION: 0
FALLS: 0
DIZZINESS: 1
SHORTNESS OF BREATH: 0

## 2019-06-30 NOTE — PROGRESS NOTES
Chief Complaint   Patient presents with   • Atrial Fibrillation       Subjective:   Francesco Schulte is a 68 y.o. male who presents today to follow-up on his coronary artery disease and atrial fibrillation.    Pertinent history:  Coronary artery disease  2008 - PCI to the LAD, POBA to the diagonal.   2014 - anterior STEMI. POBA to the diagonal. PCI to the proximal circumflex.  Restented again in 2014 after his Plavix was stopped for an injection and patient has recurrent MI.  2016 - recurrent MI. POBA to unknown vessel  2017 - anterior STEMI. Status post PCI to the proximal and mid LAD    Ischemic cardiomyopathy, ejection fraction 40%  Paroxysmal atrial fibrillation, diagnosed 2016. Intolerant to beta blockers  TIA vs. CVA in 2016  Polycythemia vera, essential thrombocytosis  Carotid stenosis status post right CEA in March 2018  Hypertension  Hyperlipidemia, intolerant to statins      Patient's main concern today is dizziness that he has had with any postural change.  He reports that his blood pressure with standing has been 100s systolic or lower and he often gets dizzy and almost passes out.  He has been drinking 8 to 10 cups of coffee to help increase his blood pressure.    He denies any palpitations.  He is on Eliquis.  Denies any bleeding issues.    For his hyperlipidemia he is on WelChol.    He denies any recurrent anginal symptoms.  He denies any heart failure symptoms.    Past Medical History:   Diagnosis Date   • Anesthesia     resistant to pain meds   • Cancer (HCC)     polycythemia vera   • Diabetes (HCC)     insulin and oral meds   • Family history of punctured lung 2002    left lung   • Heart attack (HCC) 03/2017   • Hemorrhagic disorder (HCC)     on blood thinners   • High cholesterol    • Ischemic cardiomyopathy    • Kidney stones     hx of kidney stones   • Orthostatic hypotension 3/29/2018   • Pain     headache pain   • Polycythemia vera(238.4)    • Stroke (HCC) 2016    r/t afib; eye issues  "resolved afterward   • Urinary bladder disorder     enlarged prostate, weak stream, difficulty urinating   • Vertigo      Past Surgical History:   Procedure Laterality Date   • TEMPORAL ARTERY BIOPSY Right 6/26/2018    Procedure: TEMPORAL ARTERY BIOPSY;  Surgeon: Rajendra Aguilar M.D.;  Location: SURGERY SAME DAY [facility];  Service: General   • CAROTID ENDARTERECTOMY Right 3/21/2018    Procedure: CAROTID ENDARTERECTOMY- W/EEG MONITORING;  Surgeon: Benny Morfin M.D.;  Location: SURGERY [facility];  Service: General   • APPENDECTOMY     • CATH PLACEMENT      right arm   • LAMINOTOMY      diskectomy; C4   • OTHER CARDIAC SURGERY      stents x 3     History reviewed. No pertinent family history.  Social History     Social History   • Marital status:      Spouse name: N/A   • Number of children: N/A   • Years of education: N/A     Occupational History   • Not on file.     Social History Main Topics   • Smoking status: Never Smoker   • Smokeless tobacco: Never Used   • Alcohol use No   • Drug use: No   • Sexual activity: Not on file     Other Topics Concern   • Not on file     Social History Narrative   • No narrative on file     Allergies   Allergen Reactions   • Beta Adrenergic Blockers Unspecified     dizziness   • Metformin Unspecified and Palpitations     Cardiac effects  \"Similar to a heart attack, I was hospitalized\"   • Simvastatin      Other reaction(s): Other (Specify with Comments)  Myalgias  Myalgias   • Statins [Hmg-Coa-R Inhibitors]      Muscle ache, joint pain     Outpatient Encounter Prescriptions as of 6/28/2019   Medication Sig Dispense Refill   • midodrine (PROAMATINE) 2.5 MG Tab Take 1 Tab by mouth 2 times a day. 60 Tab 5   • Semaglutide (OZEMPIC) 1 MG/DOSE Solution Pen-injector Inject 1 mg as instructed every 7 days. 2 PEN 11   • JARDIANCE 25 MG Tab Take 1 tablet by mouth every day. 30 Tab 3   • hydroxychloroquine (PLAQUENIL) 200 MG Tab TAKE 2 TABLETS BY MOUTH EVERY DAY 60 Tab 2   • " "omeprazole (PRILOSEC) 20 MG delayed-release capsule TAKE 1 CAPSULE BY MOUTH EVERY DAY 90 Cap 1   • finasteride (PROSCAR) 5 MG Tab Take 1 Tab by mouth every day. 90 Tab 3   • tamsulosin (FLOMAX) 0.4 MG capsule Take 1 Cap by mouth every evening. 90 Cap 3   • B-D INS SYRINGE 0.5CC/31GX5/16 31G X 5/16\" 0.5 ML Misc USE 1 SYRINGE EVERY DAY. 100 Each 2   • betamethasone dipropionate (DIPROLENE) 0.05 % Cream Apply 90 Applications to affected area(s) 2 times a day. To severe areas not responding to TAC 90 g 4   • triamcinolone acetonide (KENALOG) 0.1 % Cream Apply 1 Application to affected area(s) 2 times a day. 60 g 2   • ciclopirox (LOPROX) 0.77 % cream Twice daily to the affected area in the groin 1 Tube 1   • diphenhydrAMINE-ZnAcetate (BENADRYL ITCH) 1-0.1 % Cream Apply thin layer over effected area 1 Tube 0   • apixaban (ELIQUIS) 5mg Tab Take 1 Tab by mouth 2 Times a Day. 180 Tab 3   • aspirin EC (ECOTRIN) 81 MG Tablet Delayed Response Take 1 Tab by mouth every day. 30 Tab    • therapeutic multivitamin-minerals (THERAGRAN-M) Tab Take 1 Tab by mouth every day.     • hydroxyurea (HYDREA) 500 MG Cap Take 1,000 mg by mouth every morning.     • colesevelam (WELCHOL) 625 MG Tab Take 625 mg by mouth 2 times a day, with meals.       No facility-administered encounter medications on file as of 6/28/2019.      Review of Systems   Constitutional: Negative for malaise/fatigue.   Respiratory: Negative for shortness of breath.    Cardiovascular: Negative for chest pain, palpitations, orthopnea, leg swelling and PND.   Gastrointestinal: Negative for abdominal pain.   Musculoskeletal: Negative for falls.   Neurological: Positive for dizziness. Negative for loss of consciousness.   Psychiatric/Behavioral: Negative for depression.   All other systems reviewed and are negative.       Objective:   /66 (BP Location: Left arm, Patient Position: Sitting, BP Cuff Size: Adult)   Pulse 80   Ht 1.905 m (6' 3\")   Wt 90.3 kg (199 lb)   " SpO2 96%   BMI 24.87 kg/m²     Physical Exam   Constitutional: He is oriented to person, place, and time. He appears well-developed and well-nourished. No distress.   HENT:   Head: Normocephalic and atraumatic.   Eyes: Conjunctivae are normal. No scleral icterus.   Neck: Normal range of motion. Neck supple.   Cardiovascular: Normal rate, regular rhythm and normal heart sounds.  Exam reveals no gallop and no friction rub.    No murmur heard.  Pulmonary/Chest: Effort normal and breath sounds normal. No respiratory distress. He has no wheezes. He has no rales.   Abdominal: Soft. He exhibits no distension. There is no tenderness.   Musculoskeletal: He exhibits no edema.   Neurological: He is alert and oriented to person, place, and time.   Skin: Skin is warm and dry. He is not diaphoretic.   Psychiatric: He has a normal mood and affect. His behavior is normal.   Nursing note and vitals reviewed.    Echocardiogram performed in September 2017 showed a mildly reduced LV function with an EF of 45%. No significant changes from prior study.     Echocardiogram performed in August 2018 showed a moderately reduced LV systolic function with ejection fraction of about 40%.  No major valvular pathology noted.  No significant changes from prior study.    Myocardial perfusion study performed January 2019 showed inferior infarct.  No reversible defects.    Echocardiogram performed January 2019 showed mildly reduced LV systolic function with ejection fraction of about 35-40%.  No significant changes from prior study  .  Assessment:     1. Coronary artery disease involving native coronary artery of native heart without angina pectoris     2. Orthostatic hypotension     3. Paroxysmal atrial fibrillation (HCC)     4. Chronic systolic CHF (congestive heart failure) (HCC)     5. Dizziness     6. Encounter for long-term (current) use of high-risk medication         Medical Decision Making:  Today's Assessment / Status / Plan:      Dizziness:  Orthostatic hypotension:  Partial orthostatics were performed today and were unremarkable however patient has had a few month history of orthostatic hypotension.  He is off of all antihypertensives.  He is already tried increasing his salt and fluid intake and using compression stockings.  I will therefore start him on midodrine 2.5 mg once daily.  Patient understands that he is more likely to have supine hypertension with this medication.  Avoid Florinef given risk for worsening fluid overload.  Patient has been advised to avoid/minimize caffeine use.  He will be returning for repeat orthostatics in about 1 month.    Coronary artery disease:  Cardiomyopathy:  No anginal or heart failure symptoms.  Continue aspirin.  Patient is intolerant to statins.  He does not have blood pressure room for initiation of any cardioprotective medications like beta blockers or ACE inhibitors.    Paroxysmal atrial fibrillation: Relatively asymptomatic.  Continue Eliquis.  No bleeding issues.     Return to clinic in 4 months or earlier if needed.    Thank you for allowing me to participate in the care of this patient. Please do not hesitate to contact me with any questions.    Liliana Song MD  Cardiologist  [facility]      PLEASE NOTE: This dictation was created using voice recognition software.

## 2019-07-12 NOTE — TELEPHONE ENCOUNTER
Note to  to call pt for follow up appt.                    Was the patient seen in the last year in this department? Yes    Does patient have an active prescription for medications requested? Yes    Received Request Via: Pharmacy

## 2019-07-14 RX ORDER — HYDROXYCHLOROQUINE SULFATE 200 MG/1
TABLET, FILM COATED ORAL
Qty: 60 TAB | Refills: 2 | Status: SHIPPED | OUTPATIENT
Start: 2019-07-14 | End: 2019-07-29

## 2019-07-16 ENCOUNTER — HOSPITAL ENCOUNTER (OUTPATIENT)
Dept: LAB | Facility: MEDICAL CENTER | Age: 69
End: 2019-07-16
Attending: INTERNAL MEDICINE
Payer: MEDICARE

## 2019-07-16 LAB
ALBUMIN SERPL BCP-MCNC: 3.6 G/DL (ref 3.2–4.9)
ALBUMIN/GLOB SERPL: 1 G/DL
ALP SERPL-CCNC: 60 U/L (ref 30–99)
ALT SERPL-CCNC: 12 U/L (ref 2–50)
ANION GAP SERPL CALC-SCNC: 10 MMOL/L (ref 0–11.9)
ANISOCYTOSIS BLD QL SMEAR: ABNORMAL
APPEARANCE UR: CLEAR
AST SERPL-CCNC: 14 U/L (ref 12–45)
BASOPHILS # BLD AUTO: 0.7 % (ref 0–1.8)
BASOPHILS # BLD: 0.1 K/UL (ref 0–0.12)
BILIRUB SERPL-MCNC: 0.6 MG/DL (ref 0.1–1.5)
BILIRUB UR QL STRIP.AUTO: NEGATIVE
BUN SERPL-MCNC: 26 MG/DL (ref 8–22)
C3 SERPL-MCNC: 77 MG/DL (ref 87–200)
C4 SERPL-MCNC: <8 MG/DL (ref 19–52)
CALCIUM SERPL-MCNC: 9.6 MG/DL (ref 8.5–10.5)
CHLORIDE SERPL-SCNC: 102 MMOL/L (ref 96–112)
CK SERPL-CCNC: 17 U/L (ref 0–154)
CO2 SERPL-SCNC: 24 MMOL/L (ref 20–33)
COLOR UR: YELLOW
COMMENT 1642: NORMAL
CREAT SERPL-MCNC: 1.34 MG/DL (ref 0.5–1.4)
CREAT UR-MCNC: 108.8 MG/DL
CRP SERPL HS-MCNC: 10.61 MG/DL (ref 0–0.75)
EOSINOPHIL # BLD AUTO: 0.18 K/UL (ref 0–0.51)
EOSINOPHIL NFR BLD: 1.3 % (ref 0–6.9)
ERYTHROCYTE [DISTWIDTH] IN BLOOD BY AUTOMATED COUNT: 54 FL (ref 35.9–50)
ERYTHROCYTE [SEDIMENTATION RATE] IN BLOOD BY WESTERGREN METHOD: 58 MM/HOUR (ref 0–20)
FASTING STATUS PATIENT QL REPORTED: NORMAL
GIANT PLATELETS BLD QL SMEAR: NORMAL
GLOBULIN SER CALC-MCNC: 3.7 G/DL (ref 1.9–3.5)
GLUCOSE SERPL-MCNC: 210 MG/DL (ref 65–99)
GLUCOSE UR STRIP.AUTO-MCNC: >=1000 MG/DL
HCT VFR BLD AUTO: 43.5 % (ref 42–52)
HGB BLD-MCNC: 12.3 G/DL (ref 14–18)
IMM GRANULOCYTES # BLD AUTO: 0.09 K/UL (ref 0–0.11)
IMM GRANULOCYTES NFR BLD AUTO: 0.7 % (ref 0–0.9)
KETONES UR STRIP.AUTO-MCNC: ABNORMAL MG/DL
LEUKOCYTE ESTERASE UR QL STRIP.AUTO: NEGATIVE
LG PLATELETS BLD QL SMEAR: NORMAL
LYMPHOCYTES # BLD AUTO: 0.53 K/UL (ref 1–4.8)
LYMPHOCYTES NFR BLD: 3.9 % (ref 22–41)
MACROCYTES BLD QL SMEAR: ABNORMAL
MCH RBC QN AUTO: 21.8 PG (ref 27–33)
MCHC RBC AUTO-ENTMCNC: 28.3 G/DL (ref 33.7–35.3)
MCV RBC AUTO: 77.3 FL (ref 81.4–97.8)
MICRO URNS: ABNORMAL
MICROCYTES BLD QL SMEAR: ABNORMAL
MONOCYTES # BLD AUTO: 0.5 K/UL (ref 0–0.85)
MONOCYTES NFR BLD AUTO: 3.7 % (ref 0–13.4)
MORPHOLOGY BLD-IMP: NORMAL
NEUTROPHILS # BLD AUTO: 12.26 K/UL (ref 1.82–7.42)
NEUTROPHILS NFR BLD: 89.7 % (ref 44–72)
NITRITE UR QL STRIP.AUTO: NEGATIVE
NRBC # BLD AUTO: 0 K/UL
NRBC BLD-RTO: 0 /100 WBC
OVALOCYTES BLD QL SMEAR: NORMAL
PH UR STRIP.AUTO: 5.5 [PH]
PLATELET # BLD AUTO: 577 K/UL (ref 164–446)
PLATELET BLD QL SMEAR: NORMAL
PMV BLD AUTO: 11.2 FL (ref 9–12.9)
POIKILOCYTOSIS BLD QL SMEAR: NORMAL
POLYCHROMASIA BLD QL SMEAR: NORMAL
POTASSIUM SERPL-SCNC: 4.4 MMOL/L (ref 3.6–5.5)
PROT SERPL-MCNC: 7.3 G/DL (ref 6–8.2)
PROT UR QL STRIP: NEGATIVE MG/DL
PROT UR-MCNC: 13.6 MG/DL (ref 0–15)
PROT/CREAT UR: 125 MG/G (ref 15–68)
PSA SERPL-MCNC: 0.68 NG/ML (ref 0–4)
RBC # BLD AUTO: 5.63 M/UL (ref 4.7–6.1)
RBC BLD AUTO: PRESENT
RBC UR QL AUTO: NEGATIVE
SODIUM SERPL-SCNC: 136 MMOL/L (ref 135–145)
SP GR UR STRIP.AUTO: 1.04
URATE SERPL-MCNC: 6.3 MG/DL (ref 2.5–8.3)
UROBILINOGEN UR STRIP.AUTO-MCNC: 0.2 MG/DL
WBC # BLD AUTO: 13.7 K/UL (ref 4.8–10.8)

## 2019-07-16 PROCEDURE — 80053 COMPREHEN METABOLIC PANEL: CPT

## 2019-07-16 PROCEDURE — 86147 CARDIOLIPIN ANTIBODY EA IG: CPT | Mod: 91

## 2019-07-16 PROCEDURE — 85025 COMPLETE CBC W/AUTO DIFF WBC: CPT

## 2019-07-16 PROCEDURE — 84550 ASSAY OF BLOOD/URIC ACID: CPT

## 2019-07-16 PROCEDURE — 84156 ASSAY OF PROTEIN URINE: CPT

## 2019-07-16 PROCEDURE — 82550 ASSAY OF CK (CPK): CPT

## 2019-07-16 PROCEDURE — 86039 ANTINUCLEAR ANTIBODIES (ANA): CPT

## 2019-07-16 PROCEDURE — 86812 HLA TYPING A B OR C: CPT | Mod: GA

## 2019-07-16 PROCEDURE — 82085 ASSAY OF ALDOLASE: CPT

## 2019-07-16 PROCEDURE — 81003 URINALYSIS AUTO W/O SCOPE: CPT

## 2019-07-16 PROCEDURE — 36415 COLL VENOUS BLD VENIPUNCTURE: CPT

## 2019-07-16 PROCEDURE — 86255 FLUORESCENT ANTIBODY SCREEN: CPT

## 2019-07-16 PROCEDURE — 85652 RBC SED RATE AUTOMATED: CPT

## 2019-07-16 PROCEDURE — 86225 DNA ANTIBODY NATIVE: CPT

## 2019-07-16 PROCEDURE — 86140 C-REACTIVE PROTEIN: CPT

## 2019-07-16 PROCEDURE — 84153 ASSAY OF PSA TOTAL: CPT | Mod: GA

## 2019-07-16 PROCEDURE — 86146 BETA-2 GLYCOPROTEIN ANTIBODY: CPT

## 2019-07-16 PROCEDURE — 86235 NUCLEAR ANTIGEN ANTIBODY: CPT | Mod: 91

## 2019-07-16 PROCEDURE — 82570 ASSAY OF URINE CREATININE: CPT

## 2019-07-16 PROCEDURE — 86160 COMPLEMENT ANTIGEN: CPT | Mod: 91

## 2019-07-16 PROCEDURE — 86038 ANTINUCLEAR ANTIBODIES: CPT

## 2019-07-17 LAB
ALDOLASE SERPL-CCNC: 5.1 U/L (ref 1.5–8.1)
B2 GLYCOPROT1 IGG SERPL IA-ACNC: 1 SGU (ref 0–20)
B2 GLYCOPROT1 IGM SERPL IA-ACNC: 7 SMU (ref 0–20)
CARDIOLIPIN IGA SER IA-ACNC: 6 APL (ref 0–11)
CARDIOLIPIN IGG SER IA-ACNC: 5 GPL (ref 0–14)
CARDIOLIPIN IGM SER IA-ACNC: 18 MPL (ref 0–12)
HLA-B27 QL FC: NEGATIVE
NUCLEAR IGG SER QL IA: DETECTED

## 2019-07-18 LAB
ANCA IGG TITR SER IF: NORMAL {TITER}
DSDNA AB TITR SER CLIF: NORMAL {TITER}
ENA SM IGG SER-ACNC: 12 AU/ML (ref 0–40)
ENA SS-B IGG SER IA-ACNC: 12 AU/ML (ref 0–40)
SSA52 R0ENA AB IGG Q0420: 297 AU/ML (ref 0–40)
SSA60 R0ENA AB IGG Q0419: 75 AU/ML (ref 0–40)
U1 SNRNP IGG SER QL: 12 AU/ML (ref 0–40)

## 2019-07-19 ENCOUNTER — NON-PROVIDER VISIT (OUTPATIENT)
Dept: CARDIOLOGY | Facility: MEDICAL CENTER | Age: 69
End: 2019-07-19
Payer: MEDICARE

## 2019-07-19 ENCOUNTER — TELEPHONE (OUTPATIENT)
Dept: CARDIOLOGY | Facility: MEDICAL CENTER | Age: 69
End: 2019-07-19

## 2019-07-19 VITALS — OXYGEN SATURATION: 95 % | SYSTOLIC BLOOD PRESSURE: 90 MMHG | HEART RATE: 110 BPM | DIASTOLIC BLOOD PRESSURE: 70 MMHG

## 2019-07-19 DIAGNOSIS — I48.0 PAROXYSMAL ATRIAL FIBRILLATION (HCC): ICD-10-CM

## 2019-07-19 LAB
ANA INTERPRETIVE COMMENT Q5143: ABNORMAL
ANA PATTERN Q5144: ABNORMAL
ANA TITER Q5145: ABNORMAL
ANTINUCLEAR ANTIBODY (ANA), HEP-2, IGG Q5142: DETECTED
CYTOPLASMIC PATTERN TITER Q5148: ABNORMAL
EKG IMPRESSION: NORMAL

## 2019-07-19 PROCEDURE — 93000 ELECTROCARDIOGRAM COMPLETE: CPT | Performed by: INTERNAL MEDICINE

## 2019-07-19 NOTE — TELEPHONE ENCOUNTER
----- Message from Casie Salcedo, Med Ass't sent at 7/19/2019  8:11 AM PDT -----  Regarding: orthostatics and ekg  Pt came in for orthostatics, per pt has increased his Midodrine 2.5mg to six tablets a day. Patient pulse was bouncing around form 44 to 110 throughout the process, EKG was done to check for afib.  Notified Shade JORGENSEN.

## 2019-07-22 ENCOUNTER — PATIENT MESSAGE (OUTPATIENT)
Dept: MEDICAL GROUP | Facility: MEDICAL CENTER | Age: 69
End: 2019-07-22

## 2019-07-22 ENCOUNTER — TELEPHONE (OUTPATIENT)
Dept: CARDIOLOGY | Facility: MEDICAL CENTER | Age: 69
End: 2019-07-22

## 2019-07-22 DIAGNOSIS — I25.2 HISTORY OF ST ELEVATION MYOCARDIAL INFARCTION (STEMI): ICD-10-CM

## 2019-07-22 DIAGNOSIS — I95.1 ORTHOSTATIC HYPOTENSION: ICD-10-CM

## 2019-07-22 RX ORDER — MIDODRINE HYDROCHLORIDE 5 MG/1
5 TABLET ORAL 3 TIMES DAILY
Qty: 90 TAB | Refills: 1 | Status: SHIPPED | OUTPATIENT
Start: 2019-07-22 | End: 2019-08-13 | Stop reason: SDUPTHER

## 2019-07-22 NOTE — TELEPHONE ENCOUNTER
Patient CANNOT take extra midodrine like he has been.     Please change the prescription to 5mg tid. Please do NOT give any refills. I don't want him to adjust his medication himself.     He will return for orthostatics in 1 week and we will adjust his meds then. He will need a chem-7 on the same day.     Please let him know that if he keeps self-titrating his medications, we may not be able to continue prescribing medications for him as his cardiac medications are all HIGH risk. They cannot be self titrated without clear monitoring. He needs to call us for any and all cardiac medication concerns.     Thanks  AA

## 2019-07-22 NOTE — TELEPHONE ENCOUNTER
Saw [employer] message also.     I am glad that it is helping him. But again cannot take extra without discussing with us. Okay to give 1 refill only.     Thanks

## 2019-07-22 NOTE — TELEPHONE ENCOUNTER
Francesco Song M.D. 2 days ago         As you are aware yesterday there was concerns as to the blood pressure increase pill. I monitor my blood pressure 3 times A-day both standing and sitting the pill has definitely improved my dizziness is almost gone however I have had to increase to 6 pills per day    I am currently out of those pills and they will not refill please see if you can call in trippiece steam boat and have it released. I am very encouraged in feel so much better thank you             Francesco Song M.D. 2 hours ago (8:24 AM)         Ran out of heart pill Saturday. Today can't get over one hundred over 70. Sanding up drops down to 75 over 55/62 Pulse. Please refill pills and strengthen them. [facility] says no refills to soon    I was taking a 6 per day instead of 2 no dizziness..everything was doing great and I was at 1:20.  this is very alarming back lower today than before. Cant get around very dizzy no energy today         =================================    To Dr. Song to advise.

## 2019-07-24 ENCOUNTER — OFFICE VISIT (OUTPATIENT)
Dept: DERMATOLOGY | Facility: IMAGING CENTER | Age: 69
End: 2019-07-24
Payer: MEDICARE

## 2019-07-24 ENCOUNTER — PATIENT MESSAGE (OUTPATIENT)
Dept: MEDICAL GROUP | Facility: MEDICAL CENTER | Age: 69
End: 2019-07-24

## 2019-07-24 DIAGNOSIS — L98.9 DISORDER OF THE SKIN AND SUBCUTANEOUS TISSUE, UNSPECIFIED: ICD-10-CM

## 2019-07-24 PROCEDURE — 99213 OFFICE O/P EST LOW 20 MIN: CPT | Performed by: DERMATOLOGY

## 2019-07-24 RX ORDER — PREDNISONE 5 MG/1
5 TABLET ORAL DAILY
Qty: 30 TAB | Refills: 0 | Status: SHIPPED | OUTPATIENT
Start: 2019-07-24 | End: 2019-09-01 | Stop reason: SDUPTHER

## 2019-07-24 ASSESSMENT — ENCOUNTER SYMPTOMS
NAUSEA: 1
BACK PAIN: 1
BLURRED VISION: 1
WEIGHT LOSS: 1
MYALGIAS: 1
HEARTBURN: 1
SHORTNESS OF BREATH: 1
FEVER: 0
CHILLS: 0
WEAKNESS: 1

## 2019-07-24 NOTE — PROGRESS NOTES
Dermatology Return Patient Visit    Chief Complaint   Patient presents with   • Follow-Up     Dermatitis       Subjective:     HPI:   Francesco Schulte is a 68 y.o. male presenting for    Follow up dermatitis - suspected lupus/CTD  States rash quickly improved when started on plaquenil (3/18/19), but over past 6 weeks has had significant, progressive weight loss, fatigue, muscle aches, lightheadedness, blurry vision, sore throat w/ mouth sores  Has been working with cardiology to increase BP  Has been eating tremendous amount - dealing with uncontrolled blood glucose   Stopped plaquenil 2-3 days ago, restarted prednisone - 10mg daily; has helped with muscle pain ,but still feels weak  No active rash  Has been getting weekly CBCs for PCV  Following up lab results from 7/16 ordered by Dr. Sims    Saw ophtho end of March 2019 - exam OK for continued use of plaquenil, had other incidental issues - has f/u in 2 weeks  Saw Dr. Sims (rheum) last week  Dr. Sims (heme) - hydroxyurea dose mangement    S/p repeat labs 7/16 - ADAM with reflex with several abnormalities - ADAM+, SSA52 / SSA60 elevated, complements low; +ESR/CRP, CBC with WBC 13/7, hg 12.3 plt 577  Rash is sun sensitive, but no h/o butterfly rash, +mouth sores, but attributed to hydroxyurea    History:  Onset: before Christmas 2018, hospitalized 1/2/19 for chest discomfort + rash  Symptoms: itchy, burning hot skin  Aggravating factors: sun + possibly hydroxyurea?  Alleviating factors: Prednisone   New creams/topicals: n/a  New medications (up to last 6 months): n/a  New travel: n/a  Other exposures: Agent orange years ago  Prior treatments: See hospital notes, also treated with prednisone PO out of hospital  Was started on hydroxyurea 8-10 years ago when polycythemia vera diagnosed - started getting mild rashes once on this treatment, was previously managed by triamcinolone 0.1% cream prn; recently (3+ months ago) has gotten much worse, no longer  "controlled with topical steroids, has been on and off systemic steroids       Rash   Associated symptoms include joint pain, shortness of breath and a sore throat. Pertinent negatives include no fever.       Past Medical History:   Diagnosis Date   • Anesthesia     resistant to pain meds   • Cancer (HCC)     polycythemia vera   • Diabetes (HCC)     insulin and oral meds   • Family history of punctured lung 2002    left lung   • Heart attack (HCC) 03/2017   • Hemorrhagic disorder ([facility])     on blood thinners   • High cholesterol    • Ischemic cardiomyopathy    • Kidney stones     hx of kidney stones   • Orthostatic hypotension 3/29/2018   • Pain     headache pain   • Polycythemia vera(238.4)    • Stroke ([facility]) 2016    r/t afib; eye issues resolved afterward   • Urinary bladder disorder     enlarged prostate, weak stream, difficulty urinating   • Vertigo        Current Outpatient Prescriptions on File Prior to Visit   Medication Sig Dispense Refill   • midodrine (PROAMATINE) 5 MG Tab Take 1 Tab by mouth 3 times a day. 90 Tab 1   • hydroxychloroquine (PLAQUENIL) 200 MG Tab TAKE 2 TABLETS BY MOUTH EVERY DAY 60 Tab 2   • Semaglutide (OZEMPIC) 1 MG/DOSE Solution Pen-injector Inject 1 mg as instructed every 7 days. 2 PEN 11   • JARDIANCE 25 MG Tab Take 1 tablet by mouth every day. 30 Tab 3   • omeprazole (PRILOSEC) 20 MG delayed-release capsule TAKE 1 CAPSULE BY MOUTH EVERY DAY (Patient not taking: Reported on 7/24/2019) 90 Cap 1   • finasteride (PROSCAR) 5 MG Tab Take 1 Tab by mouth every day. 90 Tab 3   • tamsulosin (FLOMAX) 0.4 MG capsule Take 1 Cap by mouth every evening. 90 Cap 3   • B-D INS SYRINGE 0.5CC/31GX5/16 31G X 5/16\" 0.5 ML Misc USE 1 SYRINGE EVERY DAY. 100 Each 2   • betamethasone dipropionate (DIPROLENE) 0.05 % Cream Apply 90 Applications to affected area(s) 2 times a day. To severe areas not responding to TAC (Patient not taking: Reported on 7/24/2019) 90 g 4   • triamcinolone acetonide (KENALOG) 0.1 % Cream " "Apply 1 Application to affected area(s) 2 times a day. 60 g 2   • ciclopirox (LOPROX) 0.77 % cream Twice daily to the affected area in the groin 1 Tube 1   • diphenhydrAMINE-ZnAcetate (BENADRYL ITCH) 1-0.1 % Cream Apply thin layer over effected area 1 Tube 0   • apixaban (ELIQUIS) 5mg Tab Take 1 Tab by mouth 2 Times a Day. 180 Tab 3   • aspirin EC (ECOTRIN) 81 MG Tablet Delayed Response Take 1 Tab by mouth every day. 30 Tab    • therapeutic multivitamin-minerals (THERAGRAN-M) Tab Take 1 Tab by mouth every day.     • hydroxyurea (HYDREA) 500 MG Cap Take 1,000 mg by mouth every morning.     • colesevelam (WELCHOL) 625 MG Tab Take 625 mg by mouth 2 times a day, with meals.       No current facility-administered medications on file prior to visit.        Allergies   Allergen Reactions   • Beta Adrenergic Blockers Unspecified     dizziness   • Metformin Unspecified and Palpitations     Cardiac effects  \"Similar to a heart attack, I was hospitalized\"   • Simvastatin      Other reaction(s): Other (Specify with Comments)  Myalgias  Myalgias   • Statins [Hmg-Coa-R Inhibitors]      Muscle ache, joint pain       No family history on file.    Social History     Social History   • Marital status:      Spouse name: N/A   • Number of children: N/A   • Years of education: N/A     Occupational History   • Not on file.     Social History Main Topics   • Smoking status: Never Smoker   • Smokeless tobacco: Never Used   • Alcohol use No   • Drug use: No   • Sexual activity: Not on file     Other Topics Concern   • Not on file     Social History Narrative   • No narrative on file       Review of Systems   Constitutional: Positive for malaise/fatigue and weight loss. Negative for chills and fever.   HENT: Positive for sore throat.    Eyes: Positive for blurred vision.   Respiratory: Positive for shortness of breath.    Cardiovascular: Negative for chest pain.   Gastrointestinal: Positive for heartburn and nausea.   Musculoskeletal: " Positive for back pain, joint pain and myalgias.   Skin: Negative for itching and rash.   Neurological: Positive for weakness.   All other systems reviewed and are negative.       Objective:     A focused cutaneous exam was completed including: scalp, hair, ears, face, eyelids, conjunctiva, lips, neck, chest, left and right upper extremities (including hands/digits and fingernails), left and right lower extremities with the following pertinent findings listed below. Remaining above-listed examined areas within normal limits / negative for rashes or lesions.    There were no vitals taken for this visit.    Physical Exam   Constitutional: He is oriented to person, place, and time and well-developed, well-nourished, and in no distress.   HENT:   Head: Normocephalic and atraumatic.   Right Ear: External ear normal.   Left Ear: External ear normal.   Nose: Nose normal.   Eyes: Conjunctivae are normal.   Neck: Normal range of motion.   Pulmonary/Chest: Effort normal.   Neurological: He is alert and oriented to person, place, and time.   Skin: Skin is warm and dry.        Psychiatric: Mood and affect normal.       DATA: Labs reviewed in chart    Assessment and Plan:     1. Disease of skin and subcutaneous tissuedermatitis  Comment: Ddx seems most likely CTD/SLE, ?scle rash vs drug-induced vs other; cutaneous symptoms improved with plaquenil but recently with new significant symptoms  - agree with holding plaquenil (??weigh loss, myalgias possible result of this??)  - prednisone 5mg daily for now  - betamethasone 0.05% cream to affected area of the body twice daily only for flares until improved. Patient instructed to avoid face, axilla, and groin area. Side effects discussed, including skin thinning, appearance of stretch marks (striae), easy bruising, telangiectasias, and possible increased hair growth   - discussed importance of heme/onc w/u for alternative cause of weight loss prior to additional treatment for  ?suspected CTD (rheum considering imuran - consider CTD possible as cause of numerous new issues [I.e. weight loss/fatigue/myalgias/sore throat/mouth sores]; although no active disease on skin currently, may soon recur given off treatment above -- agree w/ plan given qualifies for lupus dx cannot d/c hydroxyurea); but agree with r/o underlying heme/onc disorder  - needs to see ophtho for blurry vision - instructed to push up appt (has one in 2 weeks)  - cont additional f/u PCP/endocrinology/cardiology as instructed  - directly to ED if progressive weakness    Risk Assessment: This patient has multiple ongoing medical problems that require continued observation and management. Potential systemic therapy, if pursued, will be complicated in this patient given numerous side effects and h/o other chronic medical issues.    Followup: Return for follow-up as patient needs to be seen (will call).    Beulah Kendrick M.D.

## 2019-07-25 ENCOUNTER — OFFICE VISIT (OUTPATIENT)
Dept: MEDICAL GROUP | Facility: MEDICAL CENTER | Age: 69
End: 2019-07-25
Payer: MEDICARE

## 2019-07-25 VITALS
HEIGHT: 75 IN | BODY MASS INDEX: 23.87 KG/M2 | WEIGHT: 192 LBS | HEART RATE: 52 BPM | TEMPERATURE: 96.9 F | OXYGEN SATURATION: 97 % | SYSTOLIC BLOOD PRESSURE: 110 MMHG | DIASTOLIC BLOOD PRESSURE: 70 MMHG | RESPIRATION RATE: 16 BRPM

## 2019-07-25 DIAGNOSIS — R53.1 GENERALIZED WEAKNESS: ICD-10-CM

## 2019-07-25 DIAGNOSIS — R68.89 HEAT INTOLERANCE: ICD-10-CM

## 2019-07-25 DIAGNOSIS — R63.4 WEIGHT LOSS: ICD-10-CM

## 2019-07-25 DIAGNOSIS — K30 INDIGESTION: ICD-10-CM

## 2019-07-25 DIAGNOSIS — I95.1 ORTHOSTATIC HYPOTENSION: ICD-10-CM

## 2019-07-25 DIAGNOSIS — M32.9 SYSTEMIC LUPUS ERYTHEMATOSUS, UNSPECIFIED SLE TYPE, UNSPECIFIED ORGAN INVOLVEMENT STATUS (HCC): ICD-10-CM

## 2019-07-25 DIAGNOSIS — E11.8 TYPE 2 DIABETES MELLITUS WITH COMPLICATION, WITHOUT LONG-TERM CURRENT USE OF INSULIN (HCC): ICD-10-CM

## 2019-07-25 DIAGNOSIS — D75.1 POLYCYTHEMIA: ICD-10-CM

## 2019-07-25 PROCEDURE — 99214 OFFICE O/P EST MOD 30 MIN: CPT | Performed by: NURSE PRACTITIONER

## 2019-07-25 ASSESSMENT — ENCOUNTER SYMPTOMS: SORE THROAT: 1

## 2019-07-25 ASSESSMENT — PATIENT HEALTH QUESTIONNAIRE - PHQ9: CLINICAL INTERPRETATION OF PHQ2 SCORE: 0

## 2019-07-25 NOTE — Clinical Note
Dr. Thomson, This is a very nice gentleman whom I have been seeing for the last few years. He has developed several very complicated medical problems at at this point I feel he would be best served by seeing internal medicine. He has an appointment to see you Monday morning. Please let me know how things go, I'd be interested to hear what your recommendations for him will be. Thank you, Cheyenne

## 2019-07-25 NOTE — TELEPHONE ENCOUNTER
From: Francesco Schulte  To: ALVINO Harman  Sent: 7/24/2019 5:39 PM PDT  Subject: Test Result Question    It's scheduled to be with you at 3:30 p.m. tomorrow. I can explain those findings from on cology as well as rheumatology however the main concern is that I have lost 37 pounds in less then 7 weeks we need to somehow figure out how to fix to stabilize the weight loss as I cannot lose any further this is very serious. The loss is Believe to be a reaction to the lupus drug unfortunately it takes 4 weeks to get it out of the system and at 5 pounds per week until it's out we have a very serious issue I will explain tomorrow     ----- Message -----  From: ALVINO Harman  Sent: 7/24/19 5:34 PM  To: Bob Schulte  Subject: RE: Test Result Question    José Miguel Maya,  I have reviewed your recent labs which do show quite a few abnormalities that are outside of my expertise for interpretation. How did Dr. Sims explained these findings? I am happy to schedule you an appointment to further discuss your concerns, or you can call scheduling to find a time that works best for you.  Cheyenne GOLDEN      ----- Message -----   From: Bob Schulte   Sent: 7/22/2019 7:54 AM PDT   To: ALVINO Harman  Subject: Test Result Question    Romatologist Levi has perform tests please review them. I am quite concerned since the heat exhaustion of 7 weeks ago I have lost 33 pounds of muscle mass and strength and continued have these attacks of muscle lock up. I need to make an immediate appointment with you to come in early morning to discuss this situation as he is not sure what's going on

## 2019-07-25 NOTE — TELEPHONE ENCOUNTER
From: Francesco Schulte  To: ALVINO Harman  Sent: 7/22/2019 7:54 AM PDT  Subject: Test Result Question    Romatologist Levi has perform tests please review them. I am quite concerned since the heat exhaustion of 7 weeks ago I have lost 33 pounds of muscle mass and strength and continued have these attacks of muscle lock up. I need to make an immediate appointment with you to come in early morning to discuss this situation as he is not sure what's going on

## 2019-07-27 NOTE — PROGRESS NOTES
Subjective:     Chief Complaint   Patient presents with   • Other     WEIGHT LOSS     Francesco Schulte is a 68 y.o. male established patient here to discuss concerns with weight loss, weakness, heat intolerance.  Francesco has a very complicated medical history.  He is a type II diabetic, poorly controlled with last A1c of 8.3, this is followed by endocrinology.  He has also had polycythemia vera which is managed by hematology oncology Dr. Sims, had been stable on hydroxyurea.  In the last 2 years he developed multiple complicating health conditions which first started with polymyalgia rheumatica.  He then developed an extensive full-body rash which was biopsied and later determined to be related to lupus.  In May he was started on Plaquenil to treat the lupus, he developed a significant amount of indigestion and heartburn which he attributed to the medication at the time.  He was started on Prilosec at that time which initially seemed to help and then later became less effective.  However, after only a few weeks on Plaquenil he began having a constellation of side effects and concerning weight loss.  He has steadily continued to lose weight despite recently stopping Plaquenil.  His rheumatologist has reassured him that the weight loss is related to the medication and will take a few more weeks to resolve.  He reportedly has to be off of the Plaquenil for 6 weeks before trying another therapy for his lupus.  After having discontinued Plaquenil his joint and muscle pain flared and sed rate increased so he is back on prednisone now.  He presents today to discuss his weight loss and strategies to stabilize this.  He feels that he is losing about 5 pounds a week although this does not correlate with documented weights in his EMR.  I have reviewed his documentation which shows weight of 228 back in February and steady decline since that time.  Current weight 192 which is down 7 pounds over the last month.  Current  BMI is 24, in normal range.  Patient states that his appetite has been good, he is focusing on healthy carbs and protein and getting several meals a day.  His glucose has been trending upward, often around 300 which may in part be related to the restart of prednisone.  He has been feeling weak recently and has had history of orthostatic hypotension which is treated currently with midodrine.  Blood pressure in good range in the office today at 110/70.  In addition to these concerns he is also experiencing what he describes as extreme heat intolerance, states that he has to leave town today and [place] the [place] where [place] cooler because he is so uncomfortable.    He has had extensive lab work done by his various specialists, I have reviewed this today.  In light of his new concern I will add thyroid studies.  His lab work has otherwise been very thorough and I do not have any additional recommendations.  I have sat down and reviewed his case with Dr. Kearns who is in agreement.    I did discuss the complexity of his medical conditions with him today and have explained that this has progressed to the point at which it is beyond my level of training and expertise.  I would like him to see internal medicine for ongoing care management and we will arrange an appointment for him.  He is in agreement.    Current medicines (including changes today)  Current Outpatient Prescriptions   Medication Sig Dispense Refill   • predniSONE (DELTASONE) 5 MG Tab Take 1 Tab by mouth every day. 30 Tab 0   • midodrine (PROAMATINE) 5 MG Tab Take 1 Tab by mouth 3 times a day. 90 Tab 1   • hydroxychloroquine (PLAQUENIL) 200 MG Tab TAKE 2 TABLETS BY MOUTH EVERY DAY 60 Tab 2   • Semaglutide (OZEMPIC) 1 MG/DOSE Solution Pen-injector Inject 1 mg as instructed every 7 days. 2 PEN 11   • JARDIANCE 25 MG Tab Take 1 tablet by mouth every day. 30 Tab 3   • omeprazole (PRILOSEC) 20 MG delayed-release capsule TAKE 1 CAPSULE BY MOUTH EVERY DAY 90  "Cap 1   • finasteride (PROSCAR) 5 MG Tab Take 1 Tab by mouth every day. 90 Tab 3   • tamsulosin (FLOMAX) 0.4 MG capsule Take 1 Cap by mouth every evening. 90 Cap 3   • B-D INS SYRINGE 0.5CC/31GX5/16 31G X 5/16\" 0.5 ML Misc USE 1 SYRINGE EVERY DAY. 100 Each 2   • betamethasone dipropionate (DIPROLENE) 0.05 % Cream Apply 90 Applications to affected area(s) 2 times a day. To severe areas not responding to TAC 90 g 4   • triamcinolone acetonide (KENALOG) 0.1 % Cream Apply 1 Application to affected area(s) 2 times a day. 60 g 2   • ciclopirox (LOPROX) 0.77 % cream Twice daily to the affected area in the groin 1 Tube 1   • diphenhydrAMINE-ZnAcetate (BENADRYL ITCH) 1-0.1 % Cream Apply thin layer over effected area 1 Tube 0   • apixaban (ELIQUIS) 5mg Tab Take 1 Tab by mouth 2 Times a Day. 180 Tab 3   • aspirin EC (ECOTRIN) 81 MG Tablet Delayed Response Take 1 Tab by mouth every day. 30 Tab    • therapeutic multivitamin-minerals (THERAGRAN-M) Tab Take 1 Tab by mouth every day.     • hydroxyurea (HYDREA) 500 MG Cap Take 1,000 mg by mouth every morning.     • colesevelam (WELCHOL) 625 MG Tab Take 625 mg by mouth 2 times a day, with meals.       No current facility-administered medications for this visit.      He  has a past medical history of Anesthesia; Cancer (HCC); Diabetes (HCC); Family history of punctured lung (2002); Heart attack (HCC) (03/2017); Hemorrhagic disorder ([facility]); High cholesterol; Ischemic cardiomyopathy; Kidney stones; Orthostatic hypotension (3/29/2018); Pain; Polycythemia vera(238.4); Stroke ([facility]) (2016); Urinary bladder disorder; and Vertigo.    ROS included above     Objective:     /70 (BP Location: Left arm, Patient Position: Sitting, BP Cuff Size: Adult)   Pulse (!) 52   Temp 36.1 °C (96.9 °F) (Temporal)   Resp 16   Ht 1.905 m (6' 3\")   Wt 87.1 kg (192 lb)   SpO2 97%  Body mass index is 24 kg/m².     Physical Exam:  General: Alert, oriented in no acute distress.  Eye contact is good, speech " is normal, affect calm  Lungs: clear to auscultation bilaterally, normal effort, no wheeze/ rhonchi/ rales.  CV: regular rate and rhythm, S1, S2, no murmur  Abdomen: soft, nontender, No CVAT, no hepatosplenomegaly  Ext: no edema, color normal, vascularity normal, temperature normal    Assessment and Plan:   The following treatment plan was discussed   1. Weight loss   progressive weight loss over the last several months, this appears to have accelerated after having started Plaquenil.  His specialists have felt this to be related to the medication and he has since stopped taking this.  According to our records he is down 7 pounds over the last month although he feels that he is losing more than this.  BMI is still in normal range at 24.  He reports good appetite and nutrition.  His specialist is felt that this will resolve over the next few weeks as his body recovers from Plaquenil.    There is a new concern today with heat intolerance I will add thyroid testing as listed below but his lab work is otherwise been extremely thorough.  I have reviewed this case with Dr. Kearns and discussed with the patient that he would be best served by seeing an internal medicine specialist moving forward, I have arranged an appointment for him to establish with Dr. Malissa Thomson in our clinic.   2. Heat intolerance   new concern with heat intolerance over the last few weeks, will obtain thyroid studies  THYROID PEROXIDASE  (TPO) AB    TSH+FREE T4    TRIIDOTHYRONINE   3. Generalized weakness      4. Polycythemia   followed by oncology hematology, currently on hydroxyurea   5. Type 2 diabetes mellitus with complication, without long-term current use of insulin (HCC)   poorly controlled with last A1c of 8.3, this may be worsening given his home glucose readings which have been trending closer to 300.  We have discussed that uncontrolled diabetes can contribute to weight loss.  He is followed by endocrinology and has an appointment  in the near future   6. Systemic lupus erythematosus, unspecified SLE type, unspecified organ involvement status (HCC)   patient has failed Plaquenil and is awaiting new treatment plan, he is followed by rheumatology   7.  8. Orthostatic hypotension   Indigestion  continue midodrine  Initially thought to be related to plaquenil but has not improved with discontinuation of this medication.  He did not note any benefit with PPI.  There is referral in place and he will call to schedule with gastroenterology       Followup: Next week to see internal medicine as scheduled         Please note that this dictation was created using voice recognition software. I have worked with consultants from the vendor as well as technical experts from [facility] to optimize the interface. I have made every reasonable attempt to correct obvious errors, but I expect that there are errors of grammar and possibly content that I did not discover before finalizing the note.

## 2019-07-29 ENCOUNTER — OFFICE VISIT (OUTPATIENT)
Dept: MEDICAL GROUP | Facility: MEDICAL CENTER | Age: 69
End: 2019-07-29
Payer: MEDICARE

## 2019-07-29 VITALS
HEIGHT: 75 IN | BODY MASS INDEX: 24.12 KG/M2 | RESPIRATION RATE: 18 BRPM | TEMPERATURE: 98.2 F | WEIGHT: 194 LBS | OXYGEN SATURATION: 98 % | HEART RATE: 94 BPM | SYSTOLIC BLOOD PRESSURE: 100 MMHG | DIASTOLIC BLOOD PRESSURE: 68 MMHG

## 2019-07-29 DIAGNOSIS — R53.1 WEAKNESS: ICD-10-CM

## 2019-07-29 DIAGNOSIS — D45 POLYCYTHEMIA VERA (HCC): ICD-10-CM

## 2019-07-29 DIAGNOSIS — I95.1 ORTHOSTATIC HYPOTENSION: ICD-10-CM

## 2019-07-29 DIAGNOSIS — K30 INDIGESTION: ICD-10-CM

## 2019-07-29 DIAGNOSIS — K21.9 GASTROESOPHAGEAL REFLUX DISEASE WITHOUT ESOPHAGITIS: ICD-10-CM

## 2019-07-29 PROCEDURE — 99214 OFFICE O/P EST MOD 30 MIN: CPT | Performed by: INTERNAL MEDICINE

## 2019-07-29 RX ORDER — OMEPRAZOLE 20 MG/1
20 TABLET, DELAYED RELEASE ORAL 2 TIMES DAILY
Qty: 60 TAB | Refills: 3 | Status: SHIPPED | OUTPATIENT
Start: 2019-07-29 | End: 2020-07-01

## 2019-07-29 NOTE — PROGRESS NOTES
"CC:  Diagnoses of Weakness, Polycythemia vera (HCC), Indigestion, Gastroesophageal reflux disease without esophagitis, and Orthostatic hypotension were pertinent to this visit.    HISTORY OF THE PRESENT ILLNESS: Patient is a 68 y.o. male. This pleasant patient is here today to establish care.    Complex medical history, chart was reviewed.  Over the past 3 months he has had significant weight loss, patient describes losing at least 5 pounds a week, total of 40 pounds.  Associated with this has been generalized weakness, heat intolerance, as well as joint/muscle feeling \"locked down\" where he had difficulty getting out of bed.  He says the weight loss, generalized weakness and increase in joint/muscle symptoms seemed to occur within 1 mo of starting Plaquenil.  Now off Plaquenil for 1 week, his weight is finally up 1 pound.  Steroids helped his joint muscle symptoms but not his generalized weakness.    History of CAD, CHF (EF 35% TTE 1/5/19; [facility] records from 12/14/16 show EF 55%; hx PAF on anticoag) and recent orthostatic hypotension requiring midodrine closely followed by cardiology.  Uncontrolled diabetes in the setting of prednisone for rheumatologic disease and increasing p.o. caloric intake for weight loss, with last A1c 8.3 on 6/18/2019.  Rheumatologic disease with potential for CTD/SLE per dermatology note (rash), and labs showing positive ADAM (speckled 1:160; with low c3/c4) and SSA (297), sed at 58/crp 10.61. Noted normal CK 17 on labs 7/16/19.  Followed by rheumatology.    He saw his oncologist last week.  The patient indicates to me that they do not feel that pan scan is needed for weight loss.  He did have a CTA thorax 1/5/2019 which showed chronic cystic lesion in the pancreas unchanged, calcified left lobe thyroid, overall no significant findings.  PSA normal 0.68 labs 7/16/2019.  Colonoscopy done at [facility], per patient within the last 7 to 8 years one small polyp found, he indicates he probably had " "10-year follow-up recommendation.  Today I tried to access these [facility] reports however care everywhere records show that the patient has not authorized the release of these particular records.    His rheumatologist did recommend the prednisone with naproxen for joint pain.  He understands the risk of this drug combination, says he is eating a lot of food and constantly taking Tums.  Omeprazole initially helped his oral sores for the first 2 weeks and then effects waned, he wishes to try this twice daily.   currently pending GI consultation.    Other nonprescribed medication includes just CBD oil for last 2 to 3 weeks.    He indicates he has had no foreign travel or exposure to parasites.  Says he was screened for HCV at [place] was negative.    He does have polycythemia vera, last platelet count in the 500 range.    Now on midodrine started by his cardiologist, blood pressure systolic at home 100 120, diastolic 60s to 70s.  He says he follows up with cardiology this Friday.    History of significant agent orange exposure.    Allergies: Beta adrenergic blockers; Metformin; Simvastatin; and Statins [hmg-coa-r inhibitors]    Current Outpatient Prescriptions Ordered in [facility]   Medication Sig Dispense Refill   • omeprazole (PRILOSEC OTC) 20 MG tablet Take 1 Tab by mouth 2 Times a Day. 60 Tab 3   • predniSONE (DELTASONE) 5 MG Tab Take 1 Tab by mouth every day. 30 Tab 0   • midodrine (PROAMATINE) 5 MG Tab Take 1 Tab by mouth 3 times a day. 90 Tab 1   • Semaglutide (OZEMPIC) 1 MG/DOSE Solution Pen-injector Inject 1 mg as instructed every 7 days. 2 PEN 11   • JARDIANCE 25 MG Tab Take 1 tablet by mouth every day. 30 Tab 3   • finasteride (PROSCAR) 5 MG Tab Take 1 Tab by mouth every day. 90 Tab 3   • tamsulosin (FLOMAX) 0.4 MG capsule Take 1 Cap by mouth every evening. 90 Cap 3   • B-D INS SYRINGE 0.5CC/31GX5/16 31G X 5/16\" 0.5 ML Misc USE 1 SYRINGE EVERY DAY. 100 Each 2   • apixaban (ELIQUIS) 5mg Tab Take 1 Tab by mouth 2 " Times a Day. 180 Tab 3   • aspirin EC (ECOTRIN) 81 MG Tablet Delayed Response Take 1 Tab by mouth every day. 30 Tab    • therapeutic multivitamin-minerals (THERAGRAN-M) Tab Take 1 Tab by mouth every day.     • hydroxyurea (HYDREA) 500 MG Cap Take 1,000 mg by mouth every morning.     • colesevelam (WELCHOL) 625 MG Tab Take 625 mg by mouth 2 times a day, with meals.       No current Epic-ordered facility-administered medications on file.        Past Medical History:   Diagnosis Date   • Anesthesia     resistant to pain meds   • Cancer (HCC)     polycythemia vera   • Diabetes (HCC)     insulin and oral meds   • Family history of punctured lung 2002    left lung   • Heart attack (HCC) 03/2017   • Hemorrhagic disorder (HCC)     on blood thinners   • High cholesterol    • Ischemic cardiomyopathy    • Kidney stones     hx of kidney stones   • Orthostatic hypotension 3/29/2018   • Pain     headache pain   • Polycythemia vera(238.4)    • Stroke (HCC) 2016    r/t afib; eye issues resolved afterward   • Urinary bladder disorder     enlarged prostate, weak stream, difficulty urinating   • Vertigo        Past Surgical History:   Procedure Laterality Date   • TEMPORAL ARTERY BIOPSY Right 6/26/2018    Procedure: TEMPORAL ARTERY BIOPSY;  Surgeon: Rajendra Aguilar M.D.;  Location: SURGERY SAME DAY [facility]S;  Service: General   • CAROTID ENDARTERECTOMY Right 3/21/2018    Procedure: CAROTID ENDARTERECTOMY- W/EEG MONITORING;  Surgeon: Benny Morfin M.D.;  Location: SURGERY [facility];  Service: General   • APPENDECTOMY     • CATH PLACEMENT      right arm   • LAMINOTOMY      diskectomy; C4   • OTHER CARDIAC SURGERY      stents x 3       Social History   Substance Use Topics   • Smoking status: Never Smoker   • Smokeless tobacco: Never Used   • Alcohol use No       Social History     Social History Narrative   • No narrative on file       History reviewed. No pertinent family history.    ROS:     - Constitutional: Negative for  "fever. See hpi wt loss/heat intolerance     - Eyes:   Negative for acute vision change    - ENT: Has chronic oral sores, rheumatologic origin per patient    - Respiratory: Negative for cough, sputum production, chest congestion, dyspnea, wheezing, and crackles.      - Cardiovascular: Negative for chest pain, palpitations, orthopnea, and bilateral lower extremity edema.     - Gastrointestinal: Negative for vomiting, abdominal pain, hematochezia, melena, diarrhea, constipation, and greasy/foul-smelling stools.     - Genitourinary: Negative for dysuria    - Musculoskeletal: See HPI  - Skin: See HPI    - Neurological: Negative for ataxia    - Endo: See HPI    - Hem/lymphatic: Negative for easy bruising, blood clots, lymphedema, swollen glands    -Allergic/immun: Negative for allergic rhinitis    - Psychiatric/Behavioral: Negative for depression, suicidal/homicidal ideation and memory loss.      Exam: /68 (BP Location: Left arm, Patient Position: Sitting, BP Cuff Size: Adult)   Pulse 94   Temp 36.8 °C (98.2 °F) (Temporal)   Resp 18   Ht 1.905 m (6' 3\")   Wt 88 kg (194 lb)   SpO2 98%  Body mass index is 24.25 kg/m².    General: Normal appearing. No distress.  EYES: Conjunctiva clear lids without ptosis, pupils equal  EARS: Normal shape and contour   Neck: Supple without LAD. Thyroid is not enlarged.  Pulmonary: Clear to ausculation.  Normal effort. No rales or wheezing.  Cardiovascular: irreg irreg, without significant murmur.   Abdomen: Soft, nontender, nondistended. Normal bowel sounds.  Neurologic: Cranial nerves grossly nonfocal  Lymph: No cervical, supraclavicular nodes palpable  Skin: Warm and dry.  No obvious lesions.  Musculoskeletal: Normal gait. No extremity cyanosis, clubbing, or edema.  Psych: Normal mood and affect. Alert and oriented x3. Judgment and insight is normal.       Assessment/Plan  1. Weakness, weight loss  Could be multifactorial, he does have history of cardiac disease, complex " rheumatologic disease, uncontrolled diabetes and recent drug reaction (Plaquenil).  Overall is euvolemic on exam.  Ultimately suspect his symptoms are due to drug reaction and rheumatologic source.  Pending labs include thyroid, will add additional labs.  - CORTISOL - AM  - PTH WITH IONIZED CALCIUM; Future    2. Polycythemia vera (HCC)  Followed by oncology, on hydroxyurea, last platelet count of 577 on labs 7/16/2019.  Stable condition.    3. Indigestion  Indigestion not controlled, will increase omeprazole to twice daily 30 minutes before breakfast and dinner.  Patient has pending GI referral--beneficial to rule out occult ulcer and other potential causes of weight loss.  - omeprazole (PRILOSEC OTC) 20 MG tablet; Take 1 Tab by mouth 2 Times a Day.  Dispense: 60 Tab; Refill: 3    4. Gastroesophageal reflux disease without esophagitis  Increase PPI as above.    5. Orthostatic hypotension  Patient has pending cardiology follow-up this week, blood pressure is now reasonable on midodrine.  - CORTISOL - AM              Please note that this dictation was created using voice recognition software. I have made every reasonable attempt to correct obvious errors, but I expect that there are errors of grammar and possibly content that I did not discover before finalizing the note.

## 2019-07-30 ENCOUNTER — PATIENT MESSAGE (OUTPATIENT)
Dept: MEDICAL GROUP | Facility: MEDICAL CENTER | Age: 69
End: 2019-07-30

## 2019-07-30 DIAGNOSIS — K30 INDIGESTION: ICD-10-CM

## 2019-07-30 NOTE — TELEPHONE ENCOUNTER
From: Francesco Schulte  To: ALVINO Harman  Sent: 2019 8:37 AM PDT  Subject: Non-Urgent Medical Question    The referral for GI has  by 3 days. They request that you rescind the referral and put urgent on it. It just caught up in the system thank you

## 2019-08-02 ENCOUNTER — HOSPITAL ENCOUNTER (OUTPATIENT)
Dept: LAB | Facility: MEDICAL CENTER | Age: 69
End: 2019-08-02
Attending: INTERNAL MEDICINE
Payer: MEDICARE

## 2019-08-02 ENCOUNTER — NON-PROVIDER VISIT (OUTPATIENT)
Dept: CARDIOLOGY | Facility: MEDICAL CENTER | Age: 69
End: 2019-08-02
Payer: MEDICARE

## 2019-08-02 ENCOUNTER — TELEPHONE (OUTPATIENT)
Dept: CARDIOLOGY | Facility: MEDICAL CENTER | Age: 69
End: 2019-08-02

## 2019-08-02 ENCOUNTER — HOSPITAL ENCOUNTER (OUTPATIENT)
Dept: LAB | Facility: MEDICAL CENTER | Age: 69
End: 2019-08-02
Attending: NURSE PRACTITIONER
Payer: MEDICARE

## 2019-08-02 VITALS — SYSTOLIC BLOOD PRESSURE: 92 MMHG | OXYGEN SATURATION: 98 % | DIASTOLIC BLOOD PRESSURE: 60 MMHG | HEART RATE: 52 BPM

## 2019-08-02 DIAGNOSIS — R53.1 WEAKNESS: ICD-10-CM

## 2019-08-02 DIAGNOSIS — R68.89 HEAT INTOLERANCE: ICD-10-CM

## 2019-08-02 LAB
CA-I SERPL-SCNC: 1.2 MMOL/L (ref 1.1–1.3)
CORTIS SERPL-MCNC: 11.7 UG/DL (ref 0–23)
PTH-INTACT SERPL-MCNC: 18.6 PG/ML (ref 14–72)
T3 SERPL-MCNC: 74.4 NG/DL (ref 60–181)
T4 FREE SERPL-MCNC: 1.17 NG/DL (ref 0.53–1.43)
THYROPEROXIDASE AB SERPL-ACNC: <0.2 IU/ML (ref 0–9)
TSH SERPL DL<=0.005 MIU/L-ACNC: 1.53 UIU/ML (ref 0.38–5.33)

## 2019-08-02 PROCEDURE — 86376 MICROSOMAL ANTIBODY EACH: CPT

## 2019-08-02 PROCEDURE — 84480 ASSAY TRIIODOTHYRONINE (T3): CPT

## 2019-08-02 PROCEDURE — 83970 ASSAY OF PARATHORMONE: CPT

## 2019-08-02 PROCEDURE — 36415 COLL VENOUS BLD VENIPUNCTURE: CPT

## 2019-08-02 PROCEDURE — 84439 ASSAY OF FREE THYROXINE: CPT

## 2019-08-02 PROCEDURE — 84443 ASSAY THYROID STIM HORMONE: CPT

## 2019-08-02 PROCEDURE — 82330 ASSAY OF CALCIUM: CPT

## 2019-08-02 PROCEDURE — 82533 TOTAL CORTISOL: CPT

## 2019-08-02 NOTE — TELEPHONE ENCOUNTER
----- Message from Luisa Diaz, Med Ass't sent at 8/2/2019  8:10 AM PDT -----  Regarding: ORTHOSTATICS  Contact: [phone]  Good Morning [place], this patient came in for orthostatics this morning. I have logged his BP's in his chart. He would like a call back to discuss @ [phone] as his My Chart messaging is not working. Pt stated he felt dizzy after eating this morning. He did get a little dizzy after I had him stand up, but his BP remained the same as sitting. Let me know if you need any other info.  Thank you.

## 2019-08-06 ENCOUNTER — HOSPITAL ENCOUNTER (OUTPATIENT)
Dept: LAB | Facility: MEDICAL CENTER | Age: 69
End: 2019-08-06
Attending: INTERNAL MEDICINE
Payer: MEDICARE

## 2019-08-06 ENCOUNTER — HOSPITAL ENCOUNTER (OUTPATIENT)
Dept: LAB | Facility: MEDICAL CENTER | Age: 69
End: 2019-08-06
Attending: NURSE PRACTITIONER
Payer: MEDICARE

## 2019-08-06 DIAGNOSIS — I95.1 ORTHOSTATIC HYPOTENSION: ICD-10-CM

## 2019-08-06 DIAGNOSIS — I25.2 HISTORY OF ST ELEVATION MYOCARDIAL INFARCTION (STEMI): ICD-10-CM

## 2019-08-06 DIAGNOSIS — E11.8 TYPE 2 DIABETES MELLITUS WITH COMPLICATION, WITHOUT LONG-TERM CURRENT USE OF INSULIN (HCC): ICD-10-CM

## 2019-08-06 LAB
ALBUMIN SERPL BCP-MCNC: 3.8 G/DL (ref 3.2–4.9)
ALBUMIN/GLOB SERPL: 1.2 G/DL
ALP SERPL-CCNC: 59 U/L (ref 30–99)
ALT SERPL-CCNC: 15 U/L (ref 2–50)
ANION GAP SERPL CALC-SCNC: 8 MMOL/L (ref 0–11.9)
ANION GAP SERPL CALC-SCNC: 9 MMOL/L (ref 0–11.9)
AST SERPL-CCNC: 14 U/L (ref 12–45)
BILIRUB SERPL-MCNC: 0.8 MG/DL (ref 0.1–1.5)
BUN SERPL-MCNC: 21 MG/DL (ref 8–22)
BUN SERPL-MCNC: 21 MG/DL (ref 8–22)
CALCIUM SERPL-MCNC: 9.4 MG/DL (ref 8.5–10.5)
CALCIUM SERPL-MCNC: 9.4 MG/DL (ref 8.5–10.5)
CHLORIDE SERPL-SCNC: 102 MMOL/L (ref 96–112)
CHLORIDE SERPL-SCNC: 103 MMOL/L (ref 96–112)
CHOLEST SERPL-MCNC: 159 MG/DL (ref 100–199)
CO2 SERPL-SCNC: 24 MMOL/L (ref 20–33)
CO2 SERPL-SCNC: 25 MMOL/L (ref 20–33)
CORTIS SERPL-MCNC: 13.7 UG/DL (ref 0–23)
CREAT SERPL-MCNC: 0.88 MG/DL (ref 0.5–1.4)
CREAT SERPL-MCNC: 0.89 MG/DL (ref 0.5–1.4)
EST. AVERAGE GLUCOSE BLD GHB EST-MCNC: 220 MG/DL
FASTING STATUS PATIENT QL REPORTED: NORMAL
FASTING STATUS PATIENT QL REPORTED: NORMAL
GLOBULIN SER CALC-MCNC: 3.2 G/DL (ref 1.9–3.5)
GLUCOSE SERPL-MCNC: 321 MG/DL (ref 65–99)
GLUCOSE SERPL-MCNC: 322 MG/DL (ref 65–99)
HBA1C MFR BLD: 9.3 % (ref 0–5.6)
HDLC SERPL-MCNC: 45 MG/DL
LDLC SERPL CALC-MCNC: 94 MG/DL
POTASSIUM SERPL-SCNC: 3.8 MMOL/L (ref 3.6–5.5)
POTASSIUM SERPL-SCNC: 3.8 MMOL/L (ref 3.6–5.5)
PROT SERPL-MCNC: 7 G/DL (ref 6–8.2)
SODIUM SERPL-SCNC: 135 MMOL/L (ref 135–145)
SODIUM SERPL-SCNC: 136 MMOL/L (ref 135–145)
T4 FREE SERPL-MCNC: 1.05 NG/DL (ref 0.53–1.43)
TRIGL SERPL-MCNC: 99 MG/DL (ref 0–149)
TSH SERPL DL<=0.005 MIU/L-ACNC: 1.69 UIU/ML (ref 0.38–5.33)

## 2019-08-06 PROCEDURE — 80061 LIPID PANEL: CPT

## 2019-08-06 PROCEDURE — 82533 TOTAL CORTISOL: CPT

## 2019-08-06 PROCEDURE — 80053 COMPREHEN METABOLIC PANEL: CPT

## 2019-08-06 PROCEDURE — 84443 ASSAY THYROID STIM HORMONE: CPT

## 2019-08-06 PROCEDURE — 83036 HEMOGLOBIN GLYCOSYLATED A1C: CPT | Mod: GA

## 2019-08-06 PROCEDURE — 84439 ASSAY OF FREE THYROXINE: CPT

## 2019-08-06 PROCEDURE — 80048 BASIC METABOLIC PNL TOTAL CA: CPT

## 2019-08-06 PROCEDURE — 36415 COLL VENOUS BLD VENIPUNCTURE: CPT

## 2019-08-07 ENCOUNTER — OFFICE VISIT (OUTPATIENT)
Dept: ENDOCRINOLOGY | Facility: MEDICAL CENTER | Age: 69
End: 2019-08-07
Payer: MEDICARE

## 2019-08-07 VITALS
WEIGHT: 195 LBS | BODY MASS INDEX: 24.25 KG/M2 | HEART RATE: 100 BPM | OXYGEN SATURATION: 95 % | DIASTOLIC BLOOD PRESSURE: 52 MMHG | SYSTOLIC BLOOD PRESSURE: 100 MMHG | HEIGHT: 75 IN

## 2019-08-07 DIAGNOSIS — Z79.4 ENCOUNTER FOR LONG-TERM (CURRENT) USE OF INSULIN (HCC): ICD-10-CM

## 2019-08-07 DIAGNOSIS — E11.8 TYPE 2 DIABETES MELLITUS WITH COMPLICATION, WITHOUT LONG-TERM CURRENT USE OF INSULIN (HCC): ICD-10-CM

## 2019-08-07 PROCEDURE — 99214 OFFICE O/P EST MOD 30 MIN: CPT | Performed by: PHYSICIAN ASSISTANT

## 2019-08-07 RX ORDER — EMPAGLIFLOZIN 25 MG/1
1 TABLET, FILM COATED ORAL DAILY
Qty: 30 TAB | Refills: 3 | COMMUNITY
Start: 2019-08-07 | End: 2019-09-13

## 2019-08-07 NOTE — PATIENT INSTRUCTIONS
Blood glucose log: Check BG in the morning when wake up,  and before bed.  So two times a day.  Always bring BG diary to the next office visit.     START:  3.  Tresiba 10 units at night before bed  (INCREASE to 20)  4.   Novolog 5 units before each meal that you eat  1.   Ozempic 1.0 once a week   2.   Jardiance 25mg one a day

## 2019-08-07 NOTE — PROGRESS NOTES
Return to office Patient Consult Note  Referred by: Malissa Thomson M.D.    Reason for consult: Diabetes Management Type 2    HPI:  Francesco Schulte is a 68 y.o. old patient who is seeing us today for diabetes care.  This is a pleasant patient with diabetes and I appreciate the opportunity to participate in the care of this patient.    Labs of 8/7/2019 HbA1c is   Labs of 6/18/2019 HbA1c is 8.5  Labs of 2/8/19 GFR >60, HbA1c is 8.8    BG Diary:8/7/2019  In the AM: no log    This patient has a complex HX with Agent Orange thrown in he states.     On 5/6/19 weight was 219 and today 195      1. Type 2 diabetes mellitus with complication, without long-term current use of insulin ([facility])  This is a new patient with me on 5/6/2019  They are on:  1.  Lantus/ Humulin N  2.  Glipizide 10mg twice a day     STOP:  1.  Lantus/ Humulin N  2.  Glipizide 10mg twice a day    START:  1.  Ozempic 1.0 once a week  2.  Jardiance 25mg one a day  3.  Tresiba 10 units at night before bed     2. Encounter for long-term (current) use of insulin ([facility])  This is stable today and no new changes are needed or required in today's visit      ROS:   Constitutional: No night sweats.  Eyes:  No visual changes.  Cardiac: No chest pain, No palpitations or racing heart rate.  Resp: No shortness of breath, No cough,   Gi: No Diarrhea    All other systems were reviewed and were/are negative.      Past Medical History:  Patient Active Problem List    Diagnosis Date Noted   • Encounter for long-term (current) use of insulin ([facility]) 05/06/2019   • Indigestion 04/25/2019   • Polycythemia vera ([facility]) 01/03/2019   • Multiple joint pain 10/03/2018   • Vertigo 08/09/2018   • Nausea and vomiting 08/09/2018   • Elevated sed rate- R/O PMR 04/04/2018   • New daily persistent headache- R/O PMR; trial steroids 04/04/2018   • Orthostatic hypotension 03/29/2018   • Stenosis of right carotid artery-Right CEA 3/21/18 03/21/2018   • Hypercoagulable state ([facility]) 03/21/2018    • Chronic headache 03/21/2018   • Dizziness 03/10/2018   • History of stroke- old right parietal/occipital 03/10/2018   • Thrombocytopenia ([facility]) 03/10/2018   • Leukopenia 03/10/2018   • Hypokalemia 03/10/2018   • Carotid stenosis, 90% right carotid 03/10/2018   • Chronic daily headache 01/09/2018   • Agent orange exposure 09/13/2017   • Chronic pain syndrome 09/13/2017   • Risk for falls 09/13/2017   • Long term (current) use of anticoagulants [Z79.01] 08/21/2017   • History of ST elevation myocardial infarction (STEMI) 03/17/2017   • SIRS (systemic inflammatory response syndrome) ([facility]) 03/16/2017   • Coagulopathy ([facility]) 03/13/2017   • Chronic systolic CHF (congestive heart failure) ([facility]) 03/13/2017   • Type 2 diabetes mellitus with complication, without long-term current use of insulin ([facility]) 03/13/2017   • Coronary artery disease involving native coronary artery of native heart without angina pectoris 03/13/2017   • Diabetes ([facility]) 03/12/2017   • BPH (benign prostatic hyperplasia) 03/12/2017   • Thrombocytosis ([facility]) 03/12/2017   • Dyslipidemia 03/12/2017   • Paroxysmal atrial fibrillation ([facility]) 03/12/2017   • Statin intolerance 03/12/2017   • STEMI (ST elevation myocardial infarction) ([facility]) 03/11/2017   • Polycythemia 12/01/2016       Past Surgical History:  Past Surgical History:   Procedure Laterality Date   • TEMPORAL ARTERY BIOPSY Right 6/26/2018    Procedure: TEMPORAL ARTERY BIOPSY;  Surgeon: Rajendra Aguilar M.D.;  Location: SURGERY SAME DAY [facility] ORS;  Service: General   • CAROTID ENDARTERECTOMY Right 3/21/2018    Procedure: CAROTID ENDARTERECTOMY- W/EEG MONITORING;  Surgeon: Benny Morfin M.D.;  Location: SURGERY [facility] ORS;  Service: General   • APPENDECTOMY     • CATH PLACEMENT      right arm   • LAMINOTOMY      diskectomy; C4   • OTHER CARDIAC SURGERY      stents x 3       Allergies:  Beta adrenergic blockers; Metformin; Simvastatin; and Statins [hmg-coa-r inhibitors]    Social History:  Social  History     Socioeconomic History   • Marital status:      Spouse name: Not on file   • Number of children: Not on file   • Years of education: Not on file   • Highest education level: Not on file   Occupational History   • Not on file   Social Needs   • Financial resource strain: Not on file   • Food insecurity:     Worry: Not on file     Inability: Not on file   • Transportation needs:     Medical: Not on file     Non-medical: Not on file   Tobacco Use   • Smoking status: Never Smoker   • Smokeless tobacco: Never Used   Substance and Sexual Activity   • Alcohol use: No   • Drug use: No   • Sexual activity: Not on file   Lifestyle   • Physical activity:     Days per week: Not on file     Minutes per session: Not on file   • Stress: Not on file   Relationships   • Social connections:     Talks on phone: Not on file     Gets together: Not on file     Attends Christianity service: Not on file     Active member of club or organization: Not on file     Attends meetings of clubs or organizations: Not on file     Relationship status: Not on file   • Intimate partner violence:     Fear of current or ex partner: Not on file     Emotionally abused: Not on file     Physically abused: Not on file     Forced sexual activity: Not on file   Other Topics Concern   • Not on file   Social History Narrative   • Not on file       Family History:  History reviewed. No pertinent family history.    Medications:    Current Outpatient Medications:   •  Semaglutide (OZEMPIC) 1 MG/DOSE Solution Pen-injector, Inject 1 mg as instructed every 7 days., Disp: 2 PEN, Rfl: 11  •  JARDIANCE 25 MG Tab, Take 1 tablet by mouth every day., Disp: 30 Tab, Rfl: 3  •  Insulin Degludec (TRESIBA FLEXTOUCH) 200 UNIT/ML Solution Pen-injector, Inject 50 Units as instructed every bedtime., Disp: 3 PEN, Rfl: 2  •  NOVOLOG, insulin aspart, (NOVOLOG FLEXPEN) 100 UNIT/ML Solution Pen-injector injection, Inject 5 Units as instructed 3 times a day before meals.,  "Disp: 5 PEN, Rfl: 5  •  omeprazole (PRILOSEC OTC) 20 MG tablet, Take 1 Tab by mouth 2 Times a Day., Disp: 60 Tab, Rfl: 3  •  predniSONE (DELTASONE) 5 MG Tab, Take 1 Tab by mouth every day., Disp: 30 Tab, Rfl: 0  •  midodrine (PROAMATINE) 5 MG Tab, Take 1 Tab by mouth 3 times a day., Disp: 90 Tab, Rfl: 1  •  Semaglutide (OZEMPIC) 1 MG/DOSE Solution Pen-injector, Inject 1 mg as instructed every 7 days., Disp: 2 PEN, Rfl: 11  •  JARDIANCE 25 MG Tab, Take 1 tablet by mouth every day., Disp: 30 Tab, Rfl: 3  •  finasteride (PROSCAR) 5 MG Tab, Take 1 Tab by mouth every day., Disp: 90 Tab, Rfl: 3  •  tamsulosin (FLOMAX) 0.4 MG capsule, Take 1 Cap by mouth every evening., Disp: 90 Cap, Rfl: 3  •  B-D INS SYRINGE 0.5CC/31GX5/16 31G X 5/16\" 0.5 ML Misc, USE 1 SYRINGE EVERY DAY., Disp: 100 Each, Rfl: 2  •  apixaban (ELIQUIS) 5mg Tab, Take 1 Tab by mouth 2 Times a Day., Disp: 180 Tab, Rfl: 3  •  aspirin EC (ECOTRIN) 81 MG Tablet Delayed Response, Take 1 Tab by mouth every day., Disp: 30 Tab, Rfl:   •  therapeutic multivitamin-minerals (THERAGRAN-M) Tab, Take 1 Tab by mouth every day., Disp: , Rfl:   •  hydroxyurea (HYDREA) 500 MG Cap, Take 1,000 mg by mouth every morning., Disp: , Rfl:   •  colesevelam (WELCHOL) 625 MG Tab, Take 625 mg by mouth 2 times a day, with meals., Disp: , Rfl:         Physical Examination:   Vital signs: /52 (BP Location: Left arm, Patient Position: Sitting)   Pulse 100   Ht 1.905 m (6' 3\")   Wt 88.5 kg (195 lb)   SpO2 95%   BMI 24.37 kg/m²   General: No distress, cooperative, well dressed and well nourished.   Eyes: No scleral icterus or discharge, No hyposphagma  ENMT: Normal on external inspection of nose, lips, No nasal drainage   Neck: No abnormal masses on inspection  Resp: Normal effort, Bilateral clear to auscultation, No wheezing, No rales  CVS: Regular rate and rhythm, S1 S2 normal, No murmur. No gallop  Extremities: No edema bilateral extremities  Neuro: Alert and oriented  Skin: No " rash, No Ulcers  Psych: Normal mood and affect      Assessment and Plan:    1. Type 2 diabetes mellitus with complication, without long-term current use of insulin (HCC)    START:  3.  Tresiba 10 units at night before bed  (INCREASE to 20)  4.   Novolog 5 units before each meal that you eat  1.   Ozempic 1.0 once a week   2.   Jardiance 25mg one a day    He is on Prednisone daily    2. Encounter for long-term (current) use of insulin (HCC)  Is on a high risk medication Insulin and we will continue to follow     Return in about 6 weeks (around 9/18/2019).      Thank you kindly for allowing me to participate in the diabetes care plan for this patient.    Naveed Mesa PA-C, BC-ADM  Board Certified - Advanced Diabetes Management  08/07/19    CC:   Malissa Thomson M.D.

## 2019-08-13 DIAGNOSIS — I95.1 ORTHOSTATIC HYPOTENSION: ICD-10-CM

## 2019-08-14 NOTE — TELEPHONE ENCOUNTER
"Liliana Song M.D.  You 11 hours ago (11:48 PM)      Please confirm that the patient is NOT taking any excessive doses of Midodrine.   If he is taking excessive doses, please remind him that these medications are not to be taken as needed.   Please start him on Florinef 0.1 mg once daily.  Please remind him to not take this medication as needed either.  If he keeps taking medications as needed and adjusting them per his choice, we may not be able to continue prescribing medications for him.     Thank you      ==============================  Called pt who states that he increased his Midodrine to 4 times a day. He states he had not heard back from us and expected to hear within 24hrs of his last email. Explained to pt that at times it can take longer than that for a response and recommendation. Also reiterated the dangers of increasing his medications without first discussing with us. Pt states that he understands, but will \"do what I need to do to keep from falling.\" Pt unwilling to listen to above recommendations from Dr. Song and states he will keep taking the 4 pills per day as he has been and will increase if he feels it is necessary. Pt states that Dr. Song can call him herself to discuss this if she wants.  "

## 2019-08-15 RX ORDER — MIDODRINE HYDROCHLORIDE 5 MG/1
TABLET ORAL
Qty: 30 TAB | Refills: 0 | Status: SHIPPED | OUTPATIENT
Start: 2019-08-15 | End: 2019-09-13 | Stop reason: SDUPTHER

## 2019-08-15 NOTE — TELEPHONE ENCOUNTER
I was worried about this.  He had confirmed non-compliance to medications at the last visit with me and also with Wendi during his BP check.   We can't refill his meds for any extended durations from here on out. No more than 30 days at a time.  Please have him come see an [facility] next available appointment.   They will need to discuss the importance of medication compliance.   If he continues to take his medications as he so desires, we will need to consider as a group what to do for him. We may need to start by him getting a second opinion with a MD in our group before deciding the long plan for him with our group.     Can you please let Sheryl know about this patient.     Thank you.

## 2019-08-16 ENCOUNTER — PATIENT MESSAGE (OUTPATIENT)
Dept: MEDICAL GROUP | Facility: MEDICAL CENTER | Age: 69
End: 2019-08-16

## 2019-08-16 NOTE — TELEPHONE ENCOUNTER
From: Francesco Schulte  To: ALVINO Harman  Sent: 8/16/2019 10:09 AM PDT  Subject: Non-Urgent Medical Question    Just got flu shot. Given compromised immune system...had pneumonia last year, do I need one this year or wait till next?  Thank you

## 2019-08-29 ENCOUNTER — OFFICE VISIT (OUTPATIENT)
Dept: ENDOCRINOLOGY | Facility: MEDICAL CENTER | Age: 69
End: 2019-08-29
Payer: MEDICARE

## 2019-08-29 VITALS
HEART RATE: 100 BPM | BODY MASS INDEX: 24.62 KG/M2 | DIASTOLIC BLOOD PRESSURE: 72 MMHG | HEIGHT: 75 IN | OXYGEN SATURATION: 98 % | WEIGHT: 198 LBS | SYSTOLIC BLOOD PRESSURE: 116 MMHG

## 2019-08-29 DIAGNOSIS — E11.8 TYPE 2 DIABETES MELLITUS WITH COMPLICATION, WITHOUT LONG-TERM CURRENT USE OF INSULIN (HCC): ICD-10-CM

## 2019-08-29 DIAGNOSIS — Z79.4 ENCOUNTER FOR LONG-TERM (CURRENT) USE OF INSULIN (HCC): ICD-10-CM

## 2019-08-29 PROCEDURE — 99214 OFFICE O/P EST MOD 30 MIN: CPT | Performed by: PHYSICIAN ASSISTANT

## 2019-08-29 RX ORDER — EMPAGLIFLOZIN 25 MG/1
1 TABLET, FILM COATED ORAL DAILY
Qty: 30 TAB | Refills: 3 | COMMUNITY
Start: 2019-08-29 | End: 2019-09-13

## 2019-08-29 NOTE — PATIENT INSTRUCTIONS
START:  3.  Tresiba 20 units at night before bed  (INCREASE to 24)  4.   Novolog 5 units before each meal that you eat (not using  1.   Ozempic 1.0 once a week   2.   Jardiance 25mg one a day

## 2019-08-29 NOTE — PROGRESS NOTES
Return to office Patient Consult Note  Referred by: Malissa Thomson M.D.    Reason for consult: Diabetes Management Type 2    HPI:  Francesco Schulte is a 69 y.o. old patient who is seeing us today for diabetes care.  This is a pleasant patient with diabetes and I appreciate the opportunity to participate in the care of this patient.    Labs of 6/18/2019 HbA1c is 8.5  Labs of 2/8/19 GFR >60, HbA1c is 8.8    BG Diary:8/29/2019  In the AM: 148, 135, 141, 150, 174, 141, 151  Before Bed: 267, 175, 130, 181, 158    This patient has a complex HX with Agent Orange thrown in he states.      On 5/6/19 weight was 219 and today 195      1. Type 2 diabetes mellitus with complication, without long-term current use of insulin (HCC)    This is a new patient with me on 5/6/2019  They are on:  1.  Lantus/ Humulin N  2.  Glipizide 10mg twice a day     STOP:  1.  Lantus/ Humulin N  2.  Glipizide 10mg twice a day     START:  3.  Tresiba 20 units at night before bed    4.   Novolog 5 units before each meal that you eat  1.   Ozempic 1.0 once a week   2.   Jardiance 25mg one a day  He is on Prednisone daily     2. Encounter for long-term (current) use of insulin (HCC)  This is stable today and no new changes are needed or required in today's visit         ROS:   Constitutional: No night sweats.  Eyes:  No visual changes.  Cardiac: No chest pain, No palpitations or racing heart rate.  Resp: No shortness of breath, No cough,   Gi: No Diarrhea    All other systems were reviewed and were/are negative.      Past Medical History:  Patient Active Problem List    Diagnosis Date Noted   • Vertigo 08/09/2018     Priority: High   • Stenosis of right carotid artery-Right CEA 3/21/18 03/21/2018     Priority: High   • Hypercoagulable state (HCC) 03/21/2018     Priority: High   • Dizziness 03/10/2018     Priority: High   • History of ST elevation myocardial infarction (STEMI) 03/17/2017     Priority: High   • Coagulopathy (HCC) 03/13/2017      Priority: High   • Thrombocytosis ([facility]) 03/12/2017     Priority: High   • STEMI (ST elevation myocardial infarction) ([facility]) 03/11/2017     Priority: High   • Chronic headache 03/21/2018     Priority: Medium   • History of stroke- old right parietal/occipital 03/10/2018     Priority: Medium   • Thrombocytopenia ([facility]) 03/10/2018     Priority: Medium   • Leukopenia 03/10/2018     Priority: Medium   • Hypokalemia 03/10/2018     Priority: Medium   • Carotid stenosis, 90% right carotid 03/10/2018     Priority: Medium   • Chronic systolic CHF (congestive heart failure) ([facility]) 03/13/2017     Priority: Medium   • Type 2 diabetes mellitus with complication, without long-term current use of insulin ([facility]) 03/13/2017     Priority: Medium   • Coronary artery disease involving native coronary artery of native heart without angina pectoris 03/13/2017     Priority: Medium   • BPH (benign prostatic hyperplasia) 03/12/2017     Priority: Medium   • Paroxysmal atrial fibrillation ([facility]) 03/12/2017     Priority: Medium   • Polycythemia 12/01/2016     Priority: Medium   • Chronic pain syndrome 09/13/2017     Priority: Low   • SIRS (systemic inflammatory response syndrome) ([facility]) 03/16/2017     Priority: Low   • Dyslipidemia 03/12/2017     Priority: Low   • Encounter for long-term (current) use of insulin ([facility]) 05/06/2019   • Indigestion 04/25/2019   • Polycythemia vera ([facility]) 01/03/2019   • Multiple joint pain 10/03/2018   • Nausea and vomiting 08/09/2018   • Elevated sed rate- R/O PMR 04/04/2018   • New daily persistent headache- R/O PMR; trial steroids 04/04/2018   • Orthostatic hypotension 03/29/2018   • Chronic daily headache 01/09/2018   • Agent orange exposure 09/13/2017   • Risk for falls 09/13/2017   • Long term (current) use of anticoagulants [Z79.01] 08/21/2017   • Diabetes ([facility]) 03/12/2017   • Statin intolerance 03/12/2017       Past Surgical History:  Past Surgical History:   Procedure Laterality Date   • TEMPORAL ARTERY BIOPSY  Right 6/26/2018    Procedure: TEMPORAL ARTERY BIOPSY;  Surgeon: Rajendra Aguilar M.D.;  Location: SURGERY SAME DAY [facility] ORS;  Service: General   • CAROTID ENDARTERECTOMY Right 3/21/2018    Procedure: CAROTID ENDARTERECTOMY- W/EEG MONITORING;  Surgeon: Benny Morfin M.D.;  Location: SURGERY [facility] ORS;  Service: General   • APPENDECTOMY     • CATH PLACEMENT      right arm   • LAMINOTOMY      diskectomy; C4   • OTHER CARDIAC SURGERY      stents x 3       Allergies:  Beta adrenergic blockers; Metformin; Simvastatin; and Statins [hmg-coa-r inhibitors]    Social History:  Social History     Socioeconomic History   • Marital status:      Spouse name: Not on file   • Number of children: Not on file   • Years of education: Not on file   • Highest education level: Not on file   Occupational History   • Not on file   Social Needs   • Financial resource strain: Not on file   • Food insecurity:     Worry: Not on file     Inability: Not on file   • Transportation needs:     Medical: Not on file     Non-medical: Not on file   Tobacco Use   • Smoking status: Never Smoker   • Smokeless tobacco: Never Used   Substance and Sexual Activity   • Alcohol use: No   • Drug use: No   • Sexual activity: Not on file   Lifestyle   • Physical activity:     Days per week: Not on file     Minutes per session: Not on file   • Stress: Not on file   Relationships   • Social connections:     Talks on phone: Not on file     Gets together: Not on file     Attends Jainism service: Not on file     Active member of club or organization: Not on file     Attends meetings of clubs or organizations: Not on file     Relationship status: Not on file   • Intimate partner violence:     Fear of current or ex partner: Not on file     Emotionally abused: Not on file     Physically abused: Not on file     Forced sexual activity: Not on file   Other Topics Concern   • Not on file   Social History Narrative   • Not on file       Family  "History:  History reviewed. No pertinent family history.    Medications:    Current Outpatient Medications:   •  Semaglutide (OZEMPIC) 1 MG/DOSE Solution Pen-injector, Inject 1 mg as instructed every 7 days., Disp: 2 PEN, Rfl: 11  •  JARDIANCE 25 MG Tab, Take 1 tablet by mouth every day., Disp: 30 Tab, Rfl: 3  •  midodrine (PROAMATINE) 5 MG Tab, TAKE 1 TABLET BY MOUTH THREE TIMES A DAY, Disp: 30 Tab, Rfl: 0  •  Semaglutide (OZEMPIC) 1 MG/DOSE Solution Pen-injector, Inject 1 mg as instructed every 7 days., Disp: 2 PEN, Rfl: 11  •  JARDIANCE 25 MG Tab, Take 1 tablet by mouth every day., Disp: 30 Tab, Rfl: 3  •  Insulin Degludec (TRESIBA FLEXTOUCH) 200 UNIT/ML Solution Pen-injector, Inject 50 Units as instructed every bedtime., Disp: 3 PEN, Rfl: 2  •  NOVOLOG, insulin aspart, (NOVOLOG FLEXPEN) 100 UNIT/ML Solution Pen-injector injection, Inject 5 Units as instructed 3 times a day before meals., Disp: 5 PEN, Rfl: 5  •  omeprazole (PRILOSEC OTC) 20 MG tablet, Take 1 Tab by mouth 2 Times a Day., Disp: 60 Tab, Rfl: 3  •  predniSONE (DELTASONE) 5 MG Tab, Take 1 Tab by mouth every day., Disp: 30 Tab, Rfl: 0  •  Semaglutide (OZEMPIC) 1 MG/DOSE Solution Pen-injector, Inject 1 mg as instructed every 7 days., Disp: 2 PEN, Rfl: 11  •  JARDIANCE 25 MG Tab, Take 1 tablet by mouth every day., Disp: 30 Tab, Rfl: 3  •  finasteride (PROSCAR) 5 MG Tab, Take 1 Tab by mouth every day., Disp: 90 Tab, Rfl: 3  •  tamsulosin (FLOMAX) 0.4 MG capsule, Take 1 Cap by mouth every evening., Disp: 90 Cap, Rfl: 3  •  B-D INS SYRINGE 0.5CC/31GX5/16 31G X 5/16\" 0.5 ML Misc, USE 1 SYRINGE EVERY DAY., Disp: 100 Each, Rfl: 2  •  apixaban (ELIQUIS) 5mg Tab, Take 1 Tab by mouth 2 Times a Day., Disp: 180 Tab, Rfl: 3  •  aspirin EC (ECOTRIN) 81 MG Tablet Delayed Response, Take 1 Tab by mouth every day., Disp: 30 Tab, Rfl:   •  therapeutic multivitamin-minerals (THERAGRAN-M) Tab, Take 1 Tab by mouth every day., Disp: , Rfl:   •  hydroxyurea (HYDREA) 500 MG " "Cap, Take 1,000 mg by mouth every morning., Disp: , Rfl:   •  colesevelam (WELCHOL) 625 MG Tab, Take 625 mg by mouth 2 times a day, with meals., Disp: , Rfl:         Physical Examination:   Vital signs: /72 (BP Location: Left arm, Patient Position: Sitting)   Pulse 100   Ht 1.905 m (6' 3\")   Wt 89.8 kg (198 lb)   SpO2 98%   BMI 24.75 kg/m²   General: No distress, cooperative, well dressed and well nourished.   Eyes: No scleral icterus or discharge, No hyposphagma  ENMT: Normal on external inspection of nose, lips, No nasal drainage   Neck: No abnormal masses on inspection  Resp: Normal effort, Bilateral clear to auscultation, No wheezing, No rales  CVS: Regular rate and rhythm, S1 S2 normal, No murmur. No gallop  Extremities: No edema bilateral extremities  Neuro: Alert and oriented  Skin: No rash, No Ulcers  Psych: Normal mood and affect      Assessment and Plan:    1. Type 2 diabetes mellitus with complication, without long-term current use of insulin (HCC)     START:  3.  Tresiba 20 units at night before bed  (INCREASE to 24)  4.   Novolog 5 units before each meal that you eat (not using)  1.   Ozempic 1.0 once a week   2.   Jardiance 25mg one a day  He is on Prednisone daily    2. Encounter for long-term (current) use of insulin (HCC)  Is on a high risk medication Insulin and we will continue to follow       Return in about 3 months (around 11/29/2019).      Thank you kindly for allowing me to participate in the diabetes care plan for this patient.    Naveed Mesa PA-C, BC-ADM  Board Certified - Advanced Diabetes Management  08/29/19    CC:   Malissa Thomson M.D.    "

## 2019-09-03 RX ORDER — PREDNISONE 5 MG/1
TABLET ORAL
Qty: 14 TAB | Refills: 0 | Status: SHIPPED | OUTPATIENT
Start: 2019-09-03 | End: 2019-09-13

## 2019-09-13 ENCOUNTER — OFFICE VISIT (OUTPATIENT)
Dept: CARDIOLOGY | Facility: MEDICAL CENTER | Age: 69
End: 2019-09-13
Payer: MEDICARE

## 2019-09-13 VITALS
HEIGHT: 74 IN | OXYGEN SATURATION: 98 % | SYSTOLIC BLOOD PRESSURE: 102 MMHG | WEIGHT: 197 LBS | HEART RATE: 102 BPM | BODY MASS INDEX: 25.28 KG/M2 | DIASTOLIC BLOOD PRESSURE: 54 MMHG

## 2019-09-13 DIAGNOSIS — I95.1 ORTHOSTATIC HYPOTENSION: ICD-10-CM

## 2019-09-13 DIAGNOSIS — R00.0 TACHYCARDIA: ICD-10-CM

## 2019-09-13 DIAGNOSIS — Z79.899 ENCOUNTER FOR LONG-TERM (CURRENT) USE OF HIGH-RISK MEDICATION: ICD-10-CM

## 2019-09-13 LAB — EKG IMPRESSION: NORMAL

## 2019-09-13 PROCEDURE — 99215 OFFICE O/P EST HI 40 MIN: CPT | Performed by: INTERNAL MEDICINE

## 2019-09-13 PROCEDURE — 93000 ELECTROCARDIOGRAM COMPLETE: CPT | Performed by: INTERNAL MEDICINE

## 2019-09-13 RX ORDER — MIDODRINE HYDROCHLORIDE 10 MG/1
15 TABLET ORAL 2 TIMES DAILY
Qty: 90 TAB | Refills: 0 | Status: SHIPPED | OUTPATIENT
Start: 2019-09-13 | End: 2019-09-30 | Stop reason: SDUPTHER

## 2019-09-13 ASSESSMENT — ENCOUNTER SYMPTOMS
DEPRESSION: 0
DIZZINESS: 1
PND: 0
FALLS: 0
ABDOMINAL PAIN: 0
SHORTNESS OF BREATH: 0
PALPITATIONS: 0
LOSS OF CONSCIOUSNESS: 0
ORTHOPNEA: 0

## 2019-09-13 NOTE — PROGRESS NOTES
"Chief Complaint   Patient presents with   • Atrial Fibrillation   • Coronary Artery Disease       Subjective:   Francesco Schulte is a 69-year-old male presented clinic for follow-up on his coronary artery disease.    Pertinent history:  Coronary artery disease  2008 - PCI to the LAD, POBA to the diagonal.   2014 - anterior STEMI. POBA to the diagonal. PCI to the proximal circumflex.  Restented again in 2014 after his Plavix was stopped for an injection and patient has recurrent MI.  2016 - recurrent MI. POBA to unknown vessel  2017 - anterior STEMI. Status post PCI to the proximal and mid LAD    Ischemic cardiomyopathy, ejection fraction 40%  Paroxysmal atrial fibrillation, diagnosed 2016. Intolerant to beta blockers  TIA vs. CVA in 2016  Polycythemia vera, essential thrombocytosis  Carotid stenosis status post right CEA in March 2018  Hypertension  Hyperlipidemia, intolerant to statins      Patient's main ongoing issue right now is his orthostatic hypotension.  Since his last visit, patient had been using his Midodrine as needed.  JOSLYN Bertrand had a phone discussion with the patient regarding the importance of medication compliance and the patient had stated that he will \"do what I need to do to keep from falling\".    Today he reports that his symptoms are improved and he is currently taking Midodrine 10 mg twice daily.  He also notes that his dizziness worsens after a heavy meal.  Every other day he eats a heavy breakfast while he is at a work meeting and on those days he has to take an extra dose of Midodrine 2.5 mg once daily.  At night he is still having frequent dizziness when he stands up.    Overall his health is better.  His diabetes is better controlled which he is very happy about.  He is able to exercise now and ride his bicycle.    Past Medical History:   Diagnosis Date   • Anesthesia     resistant to pain meds   • Cancer (HCC)     polycythemia vera   • Diabetes (HCC)     insulin and oral meds   • " Family history of punctured lung 2002    left lung   • Heart attack (HCC) 03/2017   • Hemorrhagic disorder (HCC)     on blood thinners   • High cholesterol    • Ischemic cardiomyopathy    • Kidney stones     hx of kidney stones   • Orthostatic hypotension 3/29/2018   • Pain     headache pain   • Polycythemia vera(238.4)    • Stroke (HCC) 2016    r/t afib; eye issues resolved afterward   • Urinary bladder disorder     enlarged prostate, weak stream, difficulty urinating   • Vertigo      Past Surgical History:   Procedure Laterality Date   • TEMPORAL ARTERY BIOPSY Right 6/26/2018    Procedure: TEMPORAL ARTERY BIOPSY;  Surgeon: Rajendra Aguilar M.D.;  Location: SURGERY SAME DAY [facility] ORS;  Service: General   • CAROTID ENDARTERECTOMY Right 3/21/2018    Procedure: CAROTID ENDARTERECTOMY- W/EEG MONITORING;  Surgeon: Benny Morfin M.D.;  Location: SURGERY [facility] ORS;  Service: General   • APPENDECTOMY     • CATH PLACEMENT      right arm   • LAMINOTOMY      diskectomy; C4   • OTHER CARDIAC SURGERY      stents x 3     History reviewed. No pertinent family history.  Social History     Socioeconomic History   • Marital status:      Spouse name: Not on file   • Number of children: Not on file   • Years of education: Not on file   • Highest education level: Not on file   Occupational History   • Not on file   Social Needs   • Financial resource strain: Not on file   • Food insecurity:     Worry: Not on file     Inability: Not on file   • Transportation needs:     Medical: Not on file     Non-medical: Not on file   Tobacco Use   • Smoking status: Never Smoker   • Smokeless tobacco: Never Used   Substance and Sexual Activity   • Alcohol use: No   • Drug use: No   • Sexual activity: Not on file   Lifestyle   • Physical activity:     Days per week: Not on file     Minutes per session: Not on file   • Stress: Not on file   Relationships   • Social connections:     Talks on phone: Not on file     Gets together: Not on  "file     Attends [employer] service: Not on file     Active member of club or organization: Not on file     Attends meetings of clubs or organizations: Not on file     Relationship status: Not on file   • Intimate partner violence:     Fear of current or ex partner: Not on file     Emotionally abused: Not on file     Physically abused: Not on file     Forced sexual activity: Not on file   Other Topics Concern   • Not on file   Social History Narrative   • Not on file     Allergies   Allergen Reactions   • Beta Adrenergic Blockers Unspecified     dizziness   • Metformin Unspecified and Palpitations     Cardiac effects  \"Similar to a heart attack, I was hospitalized\"   • Simvastatin      Other reaction(s): Other (Specify with Comments)  Myalgias  Myalgias   • Statins [Hmg-Coa-R Inhibitors]      Muscle ache, joint pain     Outpatient Encounter Medications as of 9/13/2019   Medication Sig Dispense Refill   • midodrine (PROAMATINE) 10 MG tablet Take 1.5 Tabs by mouth 2 times a day. 90 Tab 0   • Insulin Degludec (TRESIBA FLEXTOUCH) 200 UNIT/ML Solution Pen-injector Inject 50 Units as instructed every bedtime. 3 PEN 2   • NOVOLOG, insulin aspart, (NOVOLOG FLEXPEN) 100 UNIT/ML Solution Pen-injector injection Inject 5 Units as instructed 3 times a day before meals. 5 PEN 5   • omeprazole (PRILOSEC OTC) 20 MG tablet Take 1 Tab by mouth 2 Times a Day. 60 Tab 3   • Semaglutide (OZEMPIC) 1 MG/DOSE Solution Pen-injector Inject 1 mg as instructed every 7 days. 2 PEN 11   • JARDIANCE 25 MG Tab Take 1 tablet by mouth every day. 30 Tab 3   • finasteride (PROSCAR) 5 MG Tab Take 1 Tab by mouth every day. 90 Tab 3   • tamsulosin (FLOMAX) 0.4 MG capsule Take 1 Cap by mouth every evening. 90 Cap 3   • B-D INS SYRINGE 0.5CC/31GX5/16 31G X 5/16\" 0.5 ML Misc USE 1 SYRINGE EVERY DAY. 100 Each 2   • apixaban (ELIQUIS) 5mg Tab Take 1 Tab by mouth 2 Times a Day. 180 Tab 3   • aspirin EC (ECOTRIN) 81 MG Tablet Delayed Response Take 1 Tab by mouth " "every day. 30 Tab    • therapeutic multivitamin-minerals (THERAGRAN-M) Tab Take 1 Tab by mouth every day.     • hydroxyurea (HYDREA) 500 MG Cap Take 1,000 mg by mouth every morning.     • colesevelam (WELCHOL) 625 MG Tab Take 625 mg by mouth 2 times a day, with meals.     • [DISCONTINUED] predniSONE (DELTASONE) 5 MG Tab TAKE 1 TABLET BY MOUTH EVERY DAY (Patient not taking: Reported on 9/13/2019) 14 Tab 0   • [DISCONTINUED] Semaglutide (OZEMPIC) 1 MG/DOSE Solution Pen-injector Inject 1 mg as instructed every 7 days. (Patient not taking: Reported on 9/13/2019) 2 PEN 11   • [DISCONTINUED] JARDIANCE 25 MG Tab Take 1 tablet by mouth every day. (Patient not taking: Reported on 9/13/2019) 30 Tab 3   • [DISCONTINUED] midodrine (PROAMATINE) 5 MG Tab TAKE 1 TABLET BY MOUTH THREE TIMES A DAY 30 Tab 0   • [DISCONTINUED] Semaglutide (OZEMPIC) 1 MG/DOSE Solution Pen-injector Inject 1 mg as instructed every 7 days. (Patient not taking: Reported on 9/13/2019) 2 PEN 11   • [DISCONTINUED] JARDIANCE 25 MG Tab Take 1 tablet by mouth every day. (Patient not taking: Reported on 9/13/2019) 30 Tab 3     No facility-administered encounter medications on file as of 9/13/2019.      Review of Systems   Constitutional: Negative for malaise/fatigue.   Respiratory: Negative for shortness of breath.    Cardiovascular: Negative for chest pain, palpitations, orthopnea, leg swelling and PND.   Gastrointestinal: Negative for abdominal pain.   Musculoskeletal: Negative for falls.   Neurological: Positive for dizziness. Negative for loss of consciousness.   Psychiatric/Behavioral: Negative for depression.   All other systems reviewed and are negative.       Objective:   /54 (BP Location: Left arm, Patient Position: Sitting, BP Cuff Size: Adult)   Pulse (!) 102   Ht 1.88 m (6' 2\")   Wt 89.4 kg (197 lb)   SpO2 98%   BMI 25.29 kg/m²     Physical Exam   Constitutional: He is oriented to person, place, and time. He appears well-developed and " well-nourished. No distress.   HENT:   Head: Normocephalic and atraumatic.   Eyes: Conjunctivae are normal. No scleral icterus.   Neck: Normal range of motion. Neck supple.   Cardiovascular: Regular rhythm and normal heart sounds. Tachycardia present. Exam reveals no gallop and no friction rub.   No murmur heard.  Pulmonary/Chest: Effort normal and breath sounds normal. No respiratory distress. He has no wheezes. He has no rales.   Abdominal: Soft. He exhibits no distension. There is no tenderness.   Musculoskeletal: He exhibits no edema.   Neurological: He is alert and oriented to person, place, and time.   Skin: Skin is warm and dry. He is not diaphoretic.   Psychiatric: He has a normal mood and affect. His behavior is normal.   Nursing note and vitals reviewed.    Echocardiogram performed in September 2017 showed a mildly reduced LV function with an EF of 45%. No significant changes from prior study.     Echocardiogram performed in August 2018 showed a moderately reduced LV systolic function with ejection fraction of about 40%.  No major valvular pathology noted.  No significant changes from prior study.    Myocardial perfusion study performed January 2019 showed inferior infarct.  No reversible defects.    Echocardiogram performed January 2019 showed mildly reduced LV systolic function with ejection fraction of about 35-40%.  No significant changes from prior study    EKG performed today was personally reviewed and per my interpretation shows sinus rhythm with occasional PVCs.  Incomplete left bundle branch block.    Labs performed in August 2019 were reviewed and showed creatinine 0.8.  LDL 94.    Assessment:     1. Orthostatic hypotension  midodrine (PROAMATINE) 10 MG tablet   2. Tachycardia  EKG    Holter Monitor Study   3. Encounter for long-term (current) use of high-risk medication         Medical Decision Making:  Today's Assessment / Status / Plan:     I had an extended discussion with the patient today  regarding the importance of medication compliance.  He has a complicated cardiac history and adjusting medications by himself could be extremely dangerous for him.  I have explained to the patient that I would not be able to continue prescribing him his medications if he continued this behavior.  I also did offer a second opinion should he so desire in our practice as I do not feel comfortable giving him medications that he adjusts.      Patient understands the risks involved with medication noncompliance and is willing to be compliant but requests that he get a reply within 1 business day.  Usually when he is symptomatic he is unable to even stand up.    I requested JOSLYN Bertrand to join us during this conversation.  We have confirmed the following plan together.  Patient will comply with Midodrine dose as prescribed.  Should he have any questions he will call us and if I am not readily available, the ADD for the day can answer any questions regarding his hypotension/dizziness.  Patient understands that we cannot guarantee that a reply will be provided to him within one business day but we will try our very best.  He will only receive 30 days of medications at one time without any refills.  If he does not demonstrate compliance we will stop filling his medications and he will need to see another physician for a second opinion.  Patient is agreeable.  I have left this message under our cardiology comments [email or web address] in Epic so it is readily available for the whole group.    In the meantime since the patient has ongoing symptoms I will increase his Midodrine to the max dose of 15 mg twice daily.  I have repeatedly told the patient that this is in fact the maximum dose and he cannot take any extra doses.      Patient appears to understand the gravity of the situation.  Overall the patient exhibits high risk behaviors which is worrisome.    He will return for repeat orthostatics in 2 weeks.    Also patient was noted to be  tachycardic today.  Holter monitor has been ordered for further evaluation.    Total 45 minutes face-to-face time spent with patient, with greater than 50% of the total time discussing patient's issues and symptoms as listed above in assessment and plan, as well as managing coordination of care for future evaluation and treatment. Most of the time was spent discussing in the discussion noted above.      Return to clinic in 3 months or earlier if needed.    Thank you for allowing me to participate in the care of this patient. Please do not hesitate to contact me with any questions.    Liliana Song MD  Cardiologist  [facility]      PLEASE NOTE: This dictation was created using voice recognition software.

## 2019-09-13 NOTE — LETTER
"     [facility]   [address], [address]  [place] [address]  Phone: [phone]  Fax: [phone]              Francesco Schulte  1950    Encounter Date: 9/13/2019    Liliana Song M.D.          PROGRESS NOTE:  Chief Complaint   Patient presents with   • Atrial Fibrillation   • Coronary Artery Disease       Subjective:   Francesco Schulte is a 69-year-old male presented clinic for follow-up on his coronary artery disease.    Pertinent history:  Coronary artery disease  2008 - PCI to the LAD, POBA to the diagonal.   2014 - anterior STEMI. POBA to the diagonal. PCI to the proximal circumflex.  Restented again in 2014 after his Plavix was stopped for an injection and patient has recurrent MI.  2016 - recurrent MI. POBA to unknown vessel  2017 - anterior STEMI. Status post PCI to the proximal and mid LAD    Ischemic cardiomyopathy, ejection fraction 40%  Paroxysmal atrial fibrillation, diagnosed 2016. Intolerant to beta blockers  TIA vs. CVA in 2016  Polycythemia vera, essential thrombocytosis  Carotid stenosis status post right CEA in March 2018  Hypertension  Hyperlipidemia, intolerant to statins      Patient's main ongoing issue right now is his orthostatic hypotension.  Since his last visit, patient had been using his Midodrine as needed.  JOSLYN Bertrand had a phone discussion with the patient regarding the importance of medication compliance and the patient had stated that he will \"do what I need to do to keep from falling\".    Today he reports that his symptoms are improved and he is currently taking Midodrine 10 mg twice daily.  He also notes that his dizziness worsens after a heavy meal.  Every other day he eats a heavy breakfast while he is at a work meeting and on those days he has to take an extra dose of Midodrine 2.5 mg once daily.  At night he is still having frequent dizziness when he stands up.    Overall his health is better.  His diabetes is " better controlled which he is very happy about.  He is able to exercise now and ride his bicycle.    Past Medical History:   Diagnosis Date   • Anesthesia     resistant to pain meds   • Cancer (HCC)     polycythemia vera   • Diabetes (HCC)     insulin and oral meds   • Family history of punctured lung 2002    left lung   • Heart attack (HCC) 03/2017   • Hemorrhagic disorder (HCC)     on blood thinners   • High cholesterol    • Ischemic cardiomyopathy    • Kidney stones     hx of kidney stones   • Orthostatic hypotension 3/29/2018   • Pain     headache pain   • Polycythemia vera(238.4)    • Stroke (HCC) 2016    r/t afib; eye issues resolved afterward   • Urinary bladder disorder     enlarged prostate, weak stream, difficulty urinating   • Vertigo      Past Surgical History:   Procedure Laterality Date   • TEMPORAL ARTERY BIOPSY Right 6/26/2018    Procedure: TEMPORAL ARTERY BIOPSY;  Surgeon: Rajendra Aguilar M.D.;  Location: SURGERY SAME DAY [facility]S;  Service: General   • CAROTID ENDARTERECTOMY Right 3/21/2018    Procedure: CAROTID ENDARTERECTOMY- W/EEG MONITORING;  Surgeon: Benny Morfin M.D.;  Location: SURGERY [facility];  Service: General   • APPENDECTOMY     • CATH PLACEMENT      right arm   • LAMINOTOMY      diskectomy; C4   • OTHER CARDIAC SURGERY      stents x 3     History reviewed. No pertinent family history.  Social History     Socioeconomic History   • Marital status:      Spouse name: Not on file   • Number of children: Not on file   • Years of education: Not on file   • Highest education level: Not on file   Occupational History   • Not on file   Social Needs   • Financial resource strain: Not on file   • Food insecurity:     Worry: Not on file     Inability: Not on file   • Transportation needs:     Medical: Not on file     Non-medical: Not on file   Tobacco Use   • Smoking status: Never Smoker   • Smokeless tobacco: Never Used   Substance and Sexual Activity   • Alcohol use: No   •  "Drug use: No   • Sexual activity: Not on file   Lifestyle   • Physical activity:     Days per week: Not on file     Minutes per session: Not on file   • Stress: Not on file   Relationships   • Social connections:     Talks on phone: Not on file     Gets together: Not on file     Attends [employer] service: Not on file     Active member of club or organization: Not on file     Attends meetings of clubs or organizations: Not on file     Relationship status: Not on file   • Intimate partner violence:     Fear of current or ex partner: Not on file     Emotionally abused: Not on file     Physically abused: Not on file     Forced sexual activity: Not on file   Other Topics Concern   • Not on file   Social History Narrative   • Not on file     Allergies   Allergen Reactions   • Beta Adrenergic Blockers Unspecified     dizziness   • Metformin Unspecified and Palpitations     Cardiac effects  \"Similar to a heart attack, I was hospitalized\"   • Simvastatin      Other reaction(s): Other (Specify with Comments)  Myalgias  Myalgias   • Statins [Hmg-Coa-R Inhibitors]      Muscle ache, joint pain     Outpatient Encounter Medications as of 9/13/2019   Medication Sig Dispense Refill   • midodrine (PROAMATINE) 10 MG tablet Take 1.5 Tabs by mouth 2 times a day. 90 Tab 0   • Insulin Degludec (TRESIBA FLEXTOUCH) 200 UNIT/ML Solution Pen-injector Inject 50 Units as instructed every bedtime. 3 PEN 2   • NOVOLOG, insulin aspart, (NOVOLOG FLEXPEN) 100 UNIT/ML Solution Pen-injector injection Inject 5 Units as instructed 3 times a day before meals. 5 PEN 5   • omeprazole (PRILOSEC OTC) 20 MG tablet Take 1 Tab by mouth 2 Times a Day. 60 Tab 3   • Semaglutide (OZEMPIC) 1 MG/DOSE Solution Pen-injector Inject 1 mg as instructed every 7 days. 2 PEN 11   • JARDIANCE 25 MG Tab Take 1 tablet by mouth every day. 30 Tab 3   • finasteride (PROSCAR) 5 MG Tab Take 1 Tab by mouth every day. 90 Tab 3   • tamsulosin (FLOMAX) 0.4 MG capsule Take 1 Cap by mouth " "every evening. 90 Cap 3   • B-D INS SYRINGE 0.5CC/31GX5/16 31G X 5/16\" 0.5 ML Misc USE 1 SYRINGE EVERY DAY. 100 Each 2   • apixaban (ELIQUIS) 5mg Tab Take 1 Tab by mouth 2 Times a Day. 180 Tab 3   • aspirin EC (ECOTRIN) 81 MG Tablet Delayed Response Take 1 Tab by mouth every day. 30 Tab    • therapeutic multivitamin-minerals (THERAGRAN-M) Tab Take 1 Tab by mouth every day.     • hydroxyurea (HYDREA) 500 MG Cap Take 1,000 mg by mouth every morning.     • colesevelam (WELCHOL) 625 MG Tab Take 625 mg by mouth 2 times a day, with meals.     • [DISCONTINUED] predniSONE (DELTASONE) 5 MG Tab TAKE 1 TABLET BY MOUTH EVERY DAY (Patient not taking: Reported on 9/13/2019) 14 Tab 0   • [DISCONTINUED] Semaglutide (OZEMPIC) 1 MG/DOSE Solution Pen-injector Inject 1 mg as instructed every 7 days. (Patient not taking: Reported on 9/13/2019) 2 PEN 11   • [DISCONTINUED] JARDIANCE 25 MG Tab Take 1 tablet by mouth every day. (Patient not taking: Reported on 9/13/2019) 30 Tab 3   • [DISCONTINUED] midodrine (PROAMATINE) 5 MG Tab TAKE 1 TABLET BY MOUTH THREE TIMES A DAY 30 Tab 0   • [DISCONTINUED] Semaglutide (OZEMPIC) 1 MG/DOSE Solution Pen-injector Inject 1 mg as instructed every 7 days. (Patient not taking: Reported on 9/13/2019) 2 PEN 11   • [DISCONTINUED] JARDIANCE 25 MG Tab Take 1 tablet by mouth every day. (Patient not taking: Reported on 9/13/2019) 30 Tab 3     No facility-administered encounter medications on file as of 9/13/2019.      Review of Systems   Constitutional: Negative for malaise/fatigue.   Respiratory: Negative for shortness of breath.    Cardiovascular: Negative for chest pain, palpitations, orthopnea, leg swelling and PND.   Gastrointestinal: Negative for abdominal pain.   Musculoskeletal: Negative for falls.   Neurological: Positive for dizziness. Negative for loss of consciousness.   Psychiatric/Behavioral: Negative for depression.   All other systems reviewed and are negative.       Objective:   /54 (BP " "Location: Left arm, Patient Position: Sitting, BP Cuff Size: Adult)   Pulse (!) 102   Ht 1.88 m (6' 2\")   Wt 89.4 kg (197 lb)   SpO2 98%   BMI 25.29 kg/m²      Physical Exam   Constitutional: He is oriented to person, place, and time. He appears well-developed and well-nourished. No distress.   HENT:   Head: Normocephalic and atraumatic.   Eyes: Conjunctivae are normal. No scleral icterus.   Neck: Normal range of motion. Neck supple.   Cardiovascular: Regular rhythm and normal heart sounds. Tachycardia present. Exam reveals no gallop and no friction rub.   No murmur heard.  Pulmonary/Chest: Effort normal and breath sounds normal. No respiratory distress. He has no wheezes. He has no rales.   Abdominal: Soft. He exhibits no distension. There is no tenderness.   Musculoskeletal: He exhibits no edema.   Neurological: He is alert and oriented to person, place, and time.   Skin: Skin is warm and dry. He is not diaphoretic.   Psychiatric: He has a normal mood and affect. His behavior is normal.   Nursing note and vitals reviewed.    Echocardiogram performed in September 2017 showed a mildly reduced LV function with an EF of 45%. No significant changes from prior study.     Echocardiogram performed in August 2018 showed a moderately reduced LV systolic function with ejection fraction of about 40%.  No major valvular pathology noted.  No significant changes from prior study.    Myocardial perfusion study performed January 2019 showed inferior infarct.  No reversible defects.    Echocardiogram performed January 2019 showed mildly reduced LV systolic function with ejection fraction of about 35-40%.  No significant changes from prior study    EKG performed today was personally reviewed and per my interpretation shows sinus rhythm with occasional PVCs.  Incomplete left bundle branch block.    Labs performed in August 2019 were reviewed and showed creatinine 0.8.  LDL 94.    Assessment:     1. Orthostatic hypotension  " midodrine (PROAMATINE) 10 MG tablet   2. Tachycardia  EKG    Holter Monitor Study   3. Encounter for long-term (current) use of high-risk medication         Medical Decision Making:  Today's Assessment / Status / Plan:     I had an extended discussion with the patient today regarding the importance of medication compliance.  He has a complicated cardiac history and adjusting medications by himself could be extremely dangerous for him.  I have explained to the patient that I would not be able to continue prescribing him his medications if he continued this behavior.  I also did offer a second opinion should he so desire in our practice as I do not feel comfortable giving him medications that he adjusts.      Patient understands the risks involved with medication noncompliance and is willing to be compliant but requests that he get a reply within 1 business day.  Usually when he is symptomatic he is unable to even stand up.    I requested JOSLYN Bertrand to join us during this conversation.  We have confirmed the following plan together.  Patient will comply with Midodrine dose as prescribed.  Should he have any questions he will call us and if I am not readily available, the ADD for the day can answer any questions regarding his hypotension/dizziness.  Patient understands that we cannot guarantee that a reply will be provided to him within one business day but we will try our very best.  He will only receive 30 days of medications at one time without any refills.  If he does not demonstrate compliance we will stop filling his medications and he will need to see another physician for a second opinion.  Patient is agreeable.  I have left this message under our cardiology comments rodolfo in Epic so it is readily available for the whole group.    In the meantime since the patient has ongoing symptoms I will increase his Midodrine to the max dose of 15 mg twice daily.  I have repeatedly told the patient that this is in fact the  maximum dose and he cannot take any extra doses.      Patient appears to understand the gravity of the situation.  Overall the patient exhibits high risk behaviors which is worrisome.    He will return for repeat orthostatics in 2 weeks.    Also patient was noted to be tachycardic today.  Holter monitor has been ordered for further evaluation.    Total 45 minutes face-to-face time spent with patient, with greater than 50% of the total time discussing patient's issues and symptoms as listed above in assessment and plan, as well as managing coordination of care for future evaluation and treatment. Most of the time was spent discussing in the discussion noted above.      Return to clinic in 3 months or earlier if needed.    Thank you for allowing me to participate in the care of this patient. Please do not hesitate to contact me with any questions.    Liliana Song MD  Cardiologist  [facility]      PLEASE NOTE: This dictation was created using voice recognition software.           Malissa Thomson M.D.  [address]  [address]  [facility] [address]  VIA In Basket

## 2019-09-18 ENCOUNTER — TELEPHONE (OUTPATIENT)
Dept: CARDIOLOGY | Facility: MEDICAL CENTER | Age: 69
End: 2019-09-18

## 2019-09-18 NOTE — TELEPHONE ENCOUNTER
Received clearance request from [facility] for pt to have an EGD and colonoscopy with Sampson GOLDEN and hold eliquis. Form completed stating pt may proceed and hold medication as requested and faxed back to [phone] as requested. Confirmation received. Form sent for scanning.

## 2019-09-20 ENCOUNTER — HOSPITAL ENCOUNTER (EMERGENCY)
Facility: MEDICAL CENTER | Age: 69
End: 2019-09-20
Attending: EMERGENCY MEDICINE
Payer: MEDICARE

## 2019-09-20 VITALS
OXYGEN SATURATION: 99 % | DIASTOLIC BLOOD PRESSURE: 90 MMHG | SYSTOLIC BLOOD PRESSURE: 154 MMHG | HEIGHT: 74 IN | RESPIRATION RATE: 14 BRPM | TEMPERATURE: 97.9 F | HEART RATE: 82 BPM | BODY MASS INDEX: 24.87 KG/M2 | WEIGHT: 193.78 LBS

## 2019-09-20 DIAGNOSIS — R19.7 DIARRHEA, UNSPECIFIED TYPE: ICD-10-CM

## 2019-09-20 LAB
ALBUMIN SERPL BCP-MCNC: 3.9 G/DL (ref 3.2–4.9)
ALBUMIN/GLOB SERPL: 1.1 G/DL
ALP SERPL-CCNC: 47 U/L (ref 30–99)
ALT SERPL-CCNC: 17 U/L (ref 2–50)
ANION GAP SERPL CALC-SCNC: 13 MMOL/L (ref 0–11.9)
ANISOCYTOSIS BLD QL SMEAR: ABNORMAL
AST SERPL-CCNC: 39 U/L (ref 12–45)
BASOPHILS # BLD AUTO: 0.9 % (ref 0–1.8)
BASOPHILS # BLD: 0.04 K/UL (ref 0–0.12)
BILIRUB SERPL-MCNC: 0.6 MG/DL (ref 0.1–1.5)
BUN SERPL-MCNC: 18 MG/DL (ref 8–22)
CALCIUM SERPL-MCNC: 9.4 MG/DL (ref 8.4–10.2)
CHLORIDE SERPL-SCNC: 107 MMOL/L (ref 96–112)
CO2 SERPL-SCNC: 20 MMOL/L (ref 20–33)
COMMENT 1642: NORMAL
CREAT SERPL-MCNC: 0.97 MG/DL (ref 0.5–1.4)
EOSINOPHIL # BLD AUTO: 0.07 K/UL (ref 0–0.51)
EOSINOPHIL NFR BLD: 1.6 % (ref 0–6.9)
ERYTHROCYTE [DISTWIDTH] IN BLOOD BY AUTOMATED COUNT: 56.3 FL (ref 35.9–50)
GLOBULIN SER CALC-MCNC: 3.7 G/DL (ref 1.9–3.5)
GLUCOSE SERPL-MCNC: 105 MG/DL (ref 65–99)
HCT VFR BLD AUTO: 42.9 % (ref 42–52)
HGB BLD-MCNC: 12.3 G/DL (ref 14–18)
HYPOCHROMIA BLD QL SMEAR: ABNORMAL
IMM GRANULOCYTES # BLD AUTO: 0.04 K/UL (ref 0–0.11)
IMM GRANULOCYTES NFR BLD AUTO: 0.9 % (ref 0–0.9)
LG PLATELETS BLD QL SMEAR: NORMAL
LYMPHOCYTES # BLD AUTO: 0.64 K/UL (ref 1–4.8)
LYMPHOCYTES NFR BLD: 14.6 % (ref 22–41)
MACROCYTES BLD QL SMEAR: ABNORMAL
MCH RBC QN AUTO: 21.8 PG (ref 27–33)
MCHC RBC AUTO-ENTMCNC: 28.7 G/DL (ref 33.7–35.3)
MCV RBC AUTO: 76.2 FL (ref 81.4–97.8)
MONOCYTES # BLD AUTO: 0.51 K/UL (ref 0–0.85)
MONOCYTES NFR BLD AUTO: 11.7 % (ref 0–13.4)
NEUTROPHILS # BLD AUTO: 3.07 K/UL (ref 1.82–7.42)
NEUTROPHILS NFR BLD: 70.3 % (ref 44–72)
NRBC # BLD AUTO: 0 K/UL
NRBC BLD-RTO: 0 /100 WBC
OVALOCYTES BLD QL SMEAR: NORMAL
PLATELET # BLD AUTO: 303 K/UL (ref 164–446)
PLATELET BLD QL SMEAR: NORMAL
POIKILOCYTOSIS BLD QL SMEAR: NORMAL
POLYCHROMASIA BLD QL SMEAR: NORMAL
POTASSIUM SERPL-SCNC: 4.2 MMOL/L (ref 3.6–5.5)
PROT SERPL-MCNC: 7.6 G/DL (ref 6–8.2)
RBC # BLD AUTO: 5.63 M/UL (ref 4.7–6.1)
RBC BLD AUTO: PRESENT
SODIUM SERPL-SCNC: 140 MMOL/L (ref 135–145)
WBC # BLD AUTO: 4.4 K/UL (ref 4.8–10.8)

## 2019-09-20 PROCEDURE — 700111 HCHG RX REV CODE 636 W/ 250 OVERRIDE (IP): Performed by: EMERGENCY MEDICINE

## 2019-09-20 PROCEDURE — 99284 EMERGENCY DEPT VISIT MOD MDM: CPT

## 2019-09-20 PROCEDURE — 700105 HCHG RX REV CODE 258: Performed by: EMERGENCY MEDICINE

## 2019-09-20 PROCEDURE — 85025 COMPLETE CBC W/AUTO DIFF WBC: CPT

## 2019-09-20 PROCEDURE — 96374 THER/PROPH/DIAG INJ IV PUSH: CPT

## 2019-09-20 PROCEDURE — 36415 COLL VENOUS BLD VENIPUNCTURE: CPT

## 2019-09-20 PROCEDURE — 96376 TX/PRO/DX INJ SAME DRUG ADON: CPT

## 2019-09-20 PROCEDURE — 96375 TX/PRO/DX INJ NEW DRUG ADDON: CPT

## 2019-09-20 PROCEDURE — 80053 COMPREHEN METABOLIC PANEL: CPT

## 2019-09-20 RX ORDER — SODIUM CHLORIDE 9 MG/ML
1000 INJECTION, SOLUTION INTRAVENOUS ONCE
Status: COMPLETED | OUTPATIENT
Start: 2019-09-20 | End: 2019-09-20

## 2019-09-20 RX ORDER — DICYCLOMINE HYDROCHLORIDE 10 MG/1
10 CAPSULE ORAL
Qty: 20 CAP | Refills: 0 | Status: SHIPPED | OUTPATIENT
Start: 2019-09-20 | End: 2020-07-01

## 2019-09-20 RX ORDER — ONDANSETRON 2 MG/ML
4 INJECTION INTRAMUSCULAR; INTRAVENOUS ONCE
Status: COMPLETED | OUTPATIENT
Start: 2019-09-20 | End: 2019-09-20

## 2019-09-20 RX ORDER — MORPHINE SULFATE 4 MG/ML
4 INJECTION, SOLUTION INTRAMUSCULAR; INTRAVENOUS ONCE
Status: COMPLETED | OUTPATIENT
Start: 2019-09-20 | End: 2019-09-20

## 2019-09-20 RX ADMIN — ONDANSETRON 4 MG: 2 INJECTION INTRAMUSCULAR; INTRAVENOUS at 19:49

## 2019-09-20 RX ADMIN — SODIUM CHLORIDE 1000 ML: 9 INJECTION, SOLUTION INTRAVENOUS at 19:29

## 2019-09-20 RX ADMIN — SODIUM CHLORIDE 1000 ML: 9 INJECTION, SOLUTION INTRAVENOUS at 17:33

## 2019-09-20 RX ADMIN — MORPHINE SULFATE 4 MG: 4 INJECTION INTRAVENOUS at 19:49

## 2019-09-20 RX ADMIN — MORPHINE SULFATE 4 MG: 4 INJECTION INTRAVENOUS at 20:52

## 2019-09-20 ASSESSMENT — PAIN SCALES - WONG BAKER: WONGBAKER_NUMERICALRESPONSE: DOESN'T HURT AT ALL

## 2019-09-20 NOTE — ED TRIAGE NOTES
"Pt ambulates to triage with wife  Chief Complaint   Patient presents with   • Diarrhea   • Cramping   pt reports multiple episodes of diarrhea since eating a turkey sandwich on Sunday  Pt A & 0 x 4, speech clear, ambulates well.  Pt reports he is taking Hydrea - \"low dose chemo for blood cancer\" denies fever  Pt back to room with tech       "

## 2019-09-21 NOTE — ED NOTES
PT upset with care and that dr has not been by to see him. VSS . NAD noted. Remains on monitoring. Fall precautions in place. Call light in reach.  Aware to keep on monitoring. Dr aware pt wants to see him for test results. Pt informed of estimated wait times and made aware no wait time is guaranteed. Educated on triage and care process.  Asked to return to RN for any new or worsening of symptoms. Thanked for patience.

## 2019-09-21 NOTE — ED NOTES
Vitals:   Visit Vitals  /58   Temp 97.9 °F (36.6 °C) (Oral)   Ht 5' 3\" (1.6 m)   Wt 100.9 kg (222 lb 6.4 oz)   BMI 39.40 kg/m²       Patient ID: Gianna Cloud is a 55 year old female.  Referring Provider:  No ref. provider found      Chief Complaint   Patient presents with   • Abdominal Pain     The patient has symptoms of LUQ pain that goes into her right eye. The pain can be constant and flares 30 minutes after eating. The pain (sharp) is exacerbated near ovulation, coffee and a large meal.This pain can last 30 minutes to 2 hours. She has this pain daily. She gets bloating with gums, peppers, certain dairy (yogurt) and certain preservatives like citrus acid, vinegar salad dressing and higher content of carbs in food.This occurs 1 to 2 times per week. This lasts 1 hour. She also has cracking and burning of her lips with overall body pain after eating certain foods or having certain coffee drinks. She has has had symptoms of abdominal discomfort for about 10 years. It was in the RLQ. Last August after a meal her pain changed to the LUQ. She had gone to the ED after this happened had a cardiac work up at [facility] and she had PVC's.   She has a bowel movement daily with gas, some urgency and its fragmented and there can be oil in the toilet bowel. This has gone on since 2015. Previous CT scans, endoscopy and colonoscopy have been normal. She has had a cholecystectomy , c-sections x 3 and abdominoplasty.  Ten years ago she had an abdominoplasty, hemorrhoidal banding and leg vein work done. She had abdominoplasty and a cholecystectomy at the same time due to extra skin and a large diastasis. She had biliary colic which lead to the cholecystectomy. Her symptoms occurred after meals. Six months after the surgery she began getting bouts of uveitis. She would also get right lower back pain which then radiated to the rlq within 2 year period of the surgery. Her rlq pain was worse if dehydrated, drank coffee and if she was  ERP at bedside. Pt agrees with plan of care discussed by ERP. AIDET acknowledged with patient. Trudi in low position, side rail up for pt safety. Call light within reach. Will continue to monitor.   near her period. Her bowel movements were normal at this time. She also noted that she would get eye pain when she had more severe RLQ pain.She did get Uveitis once per year in the Spring treated with Ocular steroids.    Birth to 8 th grade:She was vaginally delivered. She was bottle fed. She was adopted. She had a somewhat troubled home because of tension with a dysfunctional adopted brother. Her father drank and smoke. She liked school. Meals were home cooked and was a SAD. Her [place] to the country in 7 th grade and she had hay fever and used antihistamines.  High School: she had a cyst under the tongue which was operated on. No smoking, minimal drinking, cheerleader.  College: [employer] x 5 years. She worked and went to school. She paid her way through school. She drank a great deal of coffee, she worked at a [employer] shop. She got pregnant twice with two elective abortions ( this was not traumatic ). She graduates with a degree in finance and accounting.  Graduates at 23-30 and moves to [place] and works for [employer]. She works long hours, travel and had stress. She begins dating her downstairs neighbor at age 29. She buys a place with him. Gets   At 33and at 34 she has child . Her other children come at 37 and 39. She noticed she had right hip pain after a pregnancy and she also had strange dizzy spells on her left side (1 yr). Her diet was SAD , her weight was steady. She took courses of antibiotics at least 10 times during her 22 to 40's URI. She also was stung by a jelly fish during this time. She got antibiotics for this.  40 to 50: See above.    Diet;  Bkfst-smoothie, McDonalds, Hot Chocolate and a scone, Oatmeal, or left overs.  Lunch-skip lunch or breakfast  Dinner- steak, or a salad or a burrito, chicken, stew.    Sleep: 8 hours. The sleep is good.  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  She is chronically overbooked and uses cafiene to help her handle things.  She describes herself as very adaptable and flexible.  She wants help kicking her coffee habit since she feels this hurts her stomach and makes her fatigue.  She is having trouble with smells.  Over the last 10 years since her surgeries she has become sensitive to metals in the right ear.          Past Medical History:   Diagnosis Date   • No known problems      Past Surgical History:   Procedure Laterality Date   • No past surgeries       Family History   Adopted: Yes   Problem Relation Age of Onset   • Cancer, Breast Maternal Grandmother      Social History     Socioeconomic History   • Marital status: /Civil Union     Spouse name: Not on file   • Number of children: Not on file   • Years of education: Not on file   • Highest education level: Not on file   Occupational History   • Not on file   Social Needs   • Financial resource strain: Not on file   • Food insecurity:     Worry: Not on file     Inability: Not on file   • Transportation needs:     Medical: Not on file     Non-medical: Not on file   Tobacco Use   • Smoking status: Never Smoker   • Smokeless tobacco: Never Used   Substance and Sexual Activity   • Alcohol use: Yes     Comment: rarely   • Drug use: Not on file   • Sexual activity: Not on file   Lifestyle   • Physical activity:     Days per week: Not on file     Minutes per session: Not on file   • Stress: Not on file   Relationships   • Social connections:     Talks on phone: Not on file     Gets together: Not on file     Attends Buddhist service: Not on file     Active member of club or organization: Not on file     Attends meetings of clubs or organizations: Not on file     Relationship status: Not on file   • Intimate partner violence:     Fear of current or ex partner: Not on file     Emotionally abused: Not on file     Physically abused: Not on file     Forced sexual activity: Not on file   Other Topics  Concern   • Not on file   Social History Narrative   • Not on file     Current Outpatient Medications   Medication Sig Dispense Refill   • rifAXIMin (XIFAXAN) 550 MG Tab Take 550 mg by mouth.     • prednisoLONE acetate (PRED FORTE, OMNIPRED) 1 % ophthalmic suspension 1 drop as needed.     • Magnesium 100 MG Cap      • Omega-3 Fatty Acids (FISH OIL PO)        No current facility-administered medications for this visit.      ALLERGIES:   Allergen Reactions   • Benzocaine THROAT SWELLING     Has reactions to anesthetics-dental only   • Lidocaine HIVES       Review of Systems   HENT:        Eye pain   Gastrointestinal: Positive for abdominal pain.   All other systems reviewed and are negative.      Physical Exam   Constitutional: She is oriented to person, place, and time. She appears well-developed and well-nourished.   HENT:   Head: Normocephalic and atraumatic.   Eyes: Pupils are equal, round, and reactive to light. EOM are normal.   Neck: Normal range of motion. Neck supple.   Cardiovascular: Normal rate, regular rhythm, normal heart sounds and intact distal pulses.   Pulmonary/Chest: Effort normal and breath sounds normal.   Abdominal: Soft.   Musculoskeletal: Normal range of motion.   Neurological: She is alert and oriented to person, place, and time.   Skin: Skin is warm and dry.   Psychiatric: She has a normal mood and affect.   Vitals reviewed.      Diagnoses and all orders for this visit:  Dyspepsia  -     ZINC BLOOD; Future  -     MRI MRCP WO CONTRAST AND ABDOMEN W WO CONTRAST; Future  Abdominal pain, unspecified abdominal location  -     ZINC BLOOD; Future  -     MRI MRCP WO CONTRAST AND ABDOMEN W WO CONTRAST; Future      Patient Instructions     I believe the patient has 3 sets of symptoms which have some but not a complete relationship.  This is been set off likely by her diet as well as her abdominal surgeries.  Currently she has  issues which include her addiction to coffee, left upper quadrant pain,  bloating, lower quadrant pain alteration in her smell, intolerance to certain foods and an elevated hemoglobin A1c.  For the bloating I am going to start her on a FODMAPS diet.  We will be able to work out which foods affect her most.  For her right lower quadrant pain I believe this is mostly musculoskeletal and I will encourage her to continue to seek physical therapy for this.  He is also done cupping across her surgical scars and I think this is a great idea and might recommend that this continue.  For her left upper quadrant pain and this may be due to a retained stone in the CBD I am going to get an MRCP.  For the alteration in her sense of smell I am going to get a zinc level.  Lastly for her detox going to ask her to increase her consumption of fruits and vegetables using all colors of the rainbow but focusing on the vegetables that are in the FODMAPS diet for now to 6-9 servings per day.  Also going to ask her to make sure she gets 1 to 2 tablespoons of ground flaxseed per day.  And I will ask her to drink approximately 60 ounces of water daily.  Before adding any supplements I am going to see how she does with the above regimen.  I am going to add a digestive enzyme called super enzyme which can be purchased on Amazon.  The brand is called now.  She will take 1 to 2 capsules with each meal.  I am going to ask her to read the book Blue Zones, get the zeina daily dozen by Daron Bergeron M.D. where she will be able to reference any nutritional questions that she has.  Lastly I am going to ask her to look at the films on [employer] one is called the Magic pill and the other what the health.      The Low FODMAP Diet   (FODMAP=Fermentable Oligo-Di-Monosaccharides and Polyols)    FODMAPs are carbohydrates (sugars) that are found in foods. Not all carbohydrates  are considered FODMAPs.    The FODMAPs in the diet are:  • Fructose (fruits, honey, high fructose corn syrup (HFCS), etc)  • Lactose (dairy)  • Fructans  (wheat, onion, garlic, etc)(fructans are also known as inulin)  • Galactans (beans, lentils, legumes such as soy, etc)  • Polyols (sweeteners containing sorbitol, mannitol, xylitol, maltitol, stone fruits       such as avocado, apricots, cherries, nectarines, peaches, plums, etc)    FODMAPs are osmotic (means they pull water into the intestinal tract), may not be  digested or absorbed well and could be fermented upon by bacteria in the intestinal  tract when eaten in excess.    Symptoms of gas, bloating, cramping and/or diarrhea may occur in those who could be  sensitive to the effects of FODMAPs.     A low FODMAP diet may help reduce symptoms,  which will limit foods high in fructose, lactose, fructans, galactans and polyols.    The low FODMAP diet is often used in those with irritable bowel syndrome (IBS). The  diet also has potential use in those with similar symptoms arising from other digestive  disorders such as inflammatory bowel disease.    This diet will also limit fiber as some high fiber foods have also high amounts of  FODMAPs. (Fiber is a component of complex carbohydrates that the body cannot  digest, found in plant based foods such as beans, fruits, vegetables, whole grains, etc)    Low FODMAP Food Choices  Food Group Foods to Eat Foods to Limit   Meats,Poultry,Fish,Eggs   beef, chicken, canned tuna, eggs,egg whites, fish, lamb, pork, shellfish,turkey, cold cuts foods made with high FODMAP fruit  sauces or with HFCS   Dairy No animal dairy    Meat, Non-Dairy Alternatives almond milk, rice milk, rice milk icecream, nuts, nut butters, seeds coconut milk, coconut cream,  beans, black eyed peas, hummus,  lentils, pistachios, soy products   Grains wheat free grains/wheat free flours (gluten free grains are wheat free):bagels, breads, hot/cold cereals(corn flakes, cheerios, cream of rice, grits,  oats, etc), crackers,noodles, pastas,quinoa, pancakes, pretzels,rice,tapioca,tortillas, waffles chicory root,  inulin, grains with  HFCS or made from wheat (terms  for wheat: einkorn, emmer, kamut,  spelt), wheat flours (terms for wheat  flour: bromated, durum, enriched,  farina, logan, semolina, white  flours), flour tortillas, rye   Fruits Bananas,berries,cantaloupe, ok avocado  grapes,grapefruit,honeydew, kiwi,kumquat, lemon, lime, mandarin,orange, passion fruit, pineapple,rhubarb, tangerine  apples, applesauce,apricots, dates, canned fruit,cherries, dried fruits, figs, guava,  lychee, darryl, nectarines, pears,papaya, peaches, plums, prunes,persimmon, watermelon   Vegetables bamboo shoots, bell peppers, bok roxie,  cucumbers, carrots, celery,  corn, eggplant, lettuce, leafy greens, pumpkin, potatoes, squash, yams, (butternut, winter), tomatoes, zucchini artichokes, asparagus, beets, leeks,broccoli, brussel sprouts, cabbage, cauliflower, fennel, green beans, mushrooms, okra, snow peas,  summer squash   Desserts any made with allowed foods any with HFCS or made with foods to limit   Beverages low FODMAP fruit/vegetable juices  (limit to ½ cup at a time), coffee, tea any with HFCS¸ high FODMAP  fruit/vegetable juices, fortified sandro (bryant kirkpatrick)   Seasonings & Condiments most spices and herbs, homemade  broth, butter, chives, flaxseed, garlicflavored oil, garlic powder, olives, margarine, mayonnaise, onion powder, olive oil, pepper, salt, sugar, maple syrup without HFCS, mustard, low FODMAP salad dressings, soy sauce, marinara sauce (small amounts), vinegar, balsamic vinegar HFCS, agave, chutneys, coconut, garlic, honey, jams, jellies, molasses, onions, pickle, relish,  high FODMAP fruit/vegetable sauces, salad dressings made with high FODMAPs, artificial  sweeteners: sorbitol, mannitol, isomalt, xylitol (cough drops, gums, mints)

## 2019-09-21 NOTE — ED NOTES
Pt greeted and aware of shift change. Denies need. VSS . NAD noted. Remains on monitoring. Fall precautions in place. Call light in reach.

## 2019-09-21 NOTE — ED NOTES
VSS . NAD noted. Remains on monitoring. Fall precautions in place. Call light in reach.  Fall teaching reviewed

## 2019-09-21 NOTE — ED NOTES
VSS . NAD noted. Remains on monitoring. Fall precautions in place. Call light in reach. IV DC with cath intact. Bleeding controled. Appropriate bandage applied. PT given DC instructions and states understanding. Denies need for assistance. Stable at DC. NAD noted. Ambulatory with ease.   Pain med teaching given and aware he can not drive

## 2019-09-21 NOTE — ED PROVIDER NOTES
ED Provider Note    CHIEF COMPLAINT  Chief Complaint   Patient presents with   • Diarrhea   • Cramping       HPI  Francesco Schulte is a 69 y.o. male who presents with complaints of severe diarrhea occurring since Sunday.  Diarrhea actually seems to have tapered off somewhat today.  He does feel like he is still distended and gurgling having quite a bit of gas, but it has been several hours since diarrhea day.  He denied any associated vomiting.  He has no fever chills or other associated symptoms he does feel like he might be dehydrated    REVIEW OF SYSTEMS  See HPI for further details.  Constitutional no fever chills all other systems are negative.    PAST MEDICAL HISTORY  Past Medical History:   Diagnosis Date   • Anesthesia     resistant to pain meds   • Cancer (HCC)     polycythemia vera   • Diabetes (HCC)     insulin and oral meds   • Family history of punctured lung 2002    left lung   • Heart attack (HCC) 03/2017   • Hemorrhagic disorder (HCC)     on blood thinners   • High cholesterol    • Ischemic cardiomyopathy    • Kidney stones     hx of kidney stones   • Orthostatic hypotension 3/29/2018   • Pain     headache pain   • Polycythemia vera(238.4)    • Stroke (HCC) 2016    r/t afib; eye issues resolved afterward   • Urinary bladder disorder     enlarged prostate, weak stream, difficulty urinating   • Vertigo        FAMILY HISTORY  No family history on file.    SOCIAL HISTORY  Social History     Socioeconomic History   • Marital status:      Spouse name: Not on file   • Number of children: Not on file   • Years of education: Not on file   • Highest education level: Not on file   Occupational History   • Not on file   Social Needs   • Financial resource strain: Not on file   • Food insecurity:     Worry: Not on file     Inability: Not on file   • Transportation needs:     Medical: Not on file     Non-medical: Not on file   Tobacco Use   • Smoking status: Never Smoker   • Smokeless tobacco: Never  "Used   Substance and Sexual Activity   • Alcohol use: No   • Drug use: No   • Sexual activity: Not on file   Lifestyle   • Physical activity:     Days per week: Not on file     Minutes per session: Not on file   • Stress: Not on file   Relationships   • Social connections:     Talks on phone: Not on file     Gets together: Not on file     Attends Holiness service: Not on file     Active member of club or organization: Not on file     Attends meetings of clubs or organizations: Not on file     Relationship status: Not on file   • Intimate partner violence:     Fear of current or ex partner: Not on file     Emotionally abused: Not on file     Physically abused: Not on file     Forced sexual activity: Not on file   Other Topics Concern   • Not on file   Social History Narrative   • Not on file       SURGICAL HISTORY  Past Surgical History:   Procedure Laterality Date   • TEMPORAL ARTERY BIOPSY Right 6/26/2018    Procedure: TEMPORAL ARTERY BIOPSY;  Surgeon: Rajendra Aguilar M.D.;  Location: SURGERY SAME DAY [facility]S;  Service: General   • CAROTID ENDARTERECTOMY Right 3/21/2018    Procedure: CAROTID ENDARTERECTOMY- W/EEG MONITORING;  Surgeon: Benny Morfin M.D.;  Location: SURGERY [facility];  Service: General   • APPENDECTOMY     • CATH PLACEMENT      right arm   • LAMINOTOMY      diskectomy; C4   • OTHER CARDIAC SURGERY      stents x 3       CURRENT MEDICATIONS  Home Medications     Reviewed by Marisela Mckeon R.N. (Registered Nurse) on 09/20/19 at 1638  Med List Status: Complete   Medication Last Dose Status   apixaban (ELIQUIS) 5mg Tab 9/20/2019 Active   aspirin EC (ECOTRIN) 81 MG Tablet Delayed Response 9/20/2019 Active   B-D INS SYRINGE 0.5CC/31GX5/16 31G X 5/16\" 0.5 ML Misc  Active   colesevelam (WELCHOL) 625 MG Tab 9/20/2019 Active   finasteride (PROSCAR) 5 MG Tab 9/20/2019 Active   hydroxyurea (HYDREA) 500 MG Cap 9/20/2019 Active   Insulin Degludec (TRESIBA FLEXTOUCH) 200 UNIT/ML Solution Pen-injector " "9/20/2019 Active   JARDIANCE 25 MG Tab 9/20/2019 Active   midodrine (PROAMATINE) 10 MG tablet 9/20/2019 Active   NOVOLOG, insulin aspart, (NOVOLOG FLEXPEN) 100 UNIT/ML Solution Pen-injector injection not taking Active   omeprazole (PRILOSEC OTC) 20 MG tablet not taking Active   Semaglutide (OZEMPIC) 1 MG/DOSE Solution Pen-injector 9/20/2019 Active   tamsulosin (FLOMAX) 0.4 MG capsule 9/20/2019 Active   therapeutic multivitamin-minerals (THERAGRAN-M) Tab 9/20/2019 Active                ALLERGIES  Allergies   Allergen Reactions   • Beta Adrenergic Blockers Unspecified     dizziness   • Metformin Unspecified and Palpitations     Cardiac effects  \"Similar to a heart attack, I was hospitalized\"   • Simvastatin      Other reaction(s): Other (Specify with Comments)  Myalgias  Myalgias   • Statins [Hmg-Coa-R Inhibitors]      Muscle ache, joint pain       PHYSICAL EXAM  VITAL SIGNS: /77   Pulse 81   Temp 36.6 °C (97.9 °F) (Temporal)   Resp 15   Ht 1.88 m (6' 2\")   Wt 87.9 kg (193 lb 12.6 oz)   SpO2 97%   BMI 24.88 kg/m²     Constitutional: Well developed, Well nourished, No acute distress, Non-toxic appearance.   HENT: Normocephalic, Atraumatic, Bilateral external ears normal, mucous membranes dry no oral exudates, Nose normal.   Eyes: PERRLA,  Conjunctiva and lids normal, No discharge.   Cardiovascular: Normal heart rate, Normal rhythm, No murmurs.   Thorax & Lungs: Normal breath sounds, No respiratory distress, No wheezing, or other abnormal breath sounds. No chest tenderness.   Abdomen: Bowel sounds normal, Soft, minimal diffuse tenderness, very active bowel sounds no guarding.  Skin: Warm, Dry, No erythema, No rash.   Back: No tenderness, No CVA tenderness.  Extremities: Intact distal pulses, No edema, No tenderness, No cyanosis, No clubbing.   Musculoskeletal: Good range of motion in all major joints. No tenderness to palpation or major deformities, or injuries noted.   Neurologic: Alert & oriented x 3, " Normal motor function, Normal sensory function, No focal deficits noted.            Results for orders placed or performed during the hospital encounter of 09/20/19   CBC WITH DIFFERENTIAL   Result Value Ref Range    WBC 4.4 (L) 4.8 - 10.8 K/uL    RBC 5.63 4.70 - 6.10 M/uL    Hemoglobin 12.3 (L) 14.0 - 18.0 g/dL    Hematocrit 42.9 42.0 - 52.0 %    MCV 76.2 (L) 81.4 - 97.8 fL    MCH 21.8 (L) 27.0 - 33.0 pg    MCHC 28.7 (L) 33.7 - 35.3 g/dL    RDW 56.3 (H) 35.9 - 50.0 fL    Platelet Count 303 164 - 446 K/uL    Neutrophils-Polys 70.30 44.00 - 72.00 %    Lymphocytes 14.60 (L) 22.00 - 41.00 %    Monocytes 11.70 0.00 - 13.40 %    Eosinophils 1.60 0.00 - 6.90 %    Basophils 0.90 0.00 - 1.80 %    Immature Granulocytes 0.90 0.00 - 0.90 %    Nucleated RBC 0.00 /100 WBC    Neutrophils (Absolute) 3.07 1.82 - 7.42 K/uL    Lymphs (Absolute) 0.64 (L) 1.00 - 4.80 K/uL    Monos (Absolute) 0.51 0.00 - 0.85 K/uL    Eos (Absolute) 0.07 0.00 - 0.51 K/uL    Baso (Absolute) 0.04 0.00 - 0.12 K/uL    Immature Granulocytes (abs) 0.04 0.00 - 0.11 K/uL    NRBC (Absolute) 0.00 K/uL    Hypochromia 2+     Anisocytosis 1+     Macrocytosis 1+    COMP METABOLIC PANEL   Result Value Ref Range    Sodium 140 135 - 145 mmol/L    Potassium 4.2 3.6 - 5.5 mmol/L    Chloride 107 96 - 112 mmol/L    Co2 20 20 - 33 mmol/L    Anion Gap 13.0 (H) 0.0 - 11.9    Glucose 105 (H) 65 - 99 mg/dL    Bun 18 8 - 22 mg/dL    Creatinine 0.97 0.50 - 1.40 mg/dL    Calcium 9.4 8.4 - 10.2 mg/dL    AST(SGOT) 39 12 - 45 U/L    ALT(SGPT) 17 2 - 50 U/L    Alkaline Phosphatase 47 30 - 99 U/L    Total Bilirubin 0.6 0.1 - 1.5 mg/dL    Albumin 3.9 3.2 - 4.9 g/dL    Total Protein 7.6 6.0 - 8.2 g/dL    Globulin 3.7 (H) 1.9 - 3.5 g/dL    A-G Ratio 1.1 g/dL   PLATELET ESTIMATE   Result Value Ref Range    Plt Estimation Normal    MORPHOLOGY   Result Value Ref Range    RBC Morphology Present     Large Platelets 1+     Polychromia 1+     Poikilocytosis 1+     Ovalocytes 1+    DIFFERENTIAL  COMMENT   Result Value Ref Range    Comments-Diff see below    ESTIMATED GFR   Result Value Ref Range    GFR If African American >60 >60 mL/min/1.73 m 2    GFR If Non African American >60 >60 mL/min/1.73 m 2       COURSE & MEDICAL DECISION MAKING  Pertinent Labs & Imaging studies reviewed. (See chart for details)  Patient is hydrated with IV fluids and is feeling somewhat better following that he was given a couple of doses of morphine for abdominal cramps and is feeling better from that standpoint he does not have any vomiting he does not have distention or constipated with suggest obstruction    FINAL IMPRESSION  1.  Diarrhea  2.   3.       Electronically signed by: David Solitario, 9/20/2019 7:38 PM

## 2019-09-23 NOTE — TELEPHONE ENCOUNTER
**Last office visit 8/29/19**    Was the patient seen in the last year in this department? Yes    Does patient have an active prescription for medications requested? Yes    Received Request Via: Patient    Day supply: 90

## 2019-09-30 DIAGNOSIS — I95.1 ORTHOSTATIC HYPOTENSION: ICD-10-CM

## 2019-09-30 RX ORDER — MIDODRINE HYDROCHLORIDE 10 MG/1
15 TABLET ORAL 2 TIMES DAILY
Qty: 90 TAB | Refills: 0 | Status: SHIPPED | OUTPATIENT
Start: 2019-09-30 | End: 2019-10-04

## 2019-10-02 ENCOUNTER — NON-PROVIDER VISIT (OUTPATIENT)
Dept: CARDIOLOGY | Facility: MEDICAL CENTER | Age: 69
End: 2019-10-02

## 2019-10-02 ENCOUNTER — NON-PROVIDER VISIT (OUTPATIENT)
Dept: CARDIOLOGY | Facility: MEDICAL CENTER | Age: 69
End: 2019-10-02
Payer: MEDICARE

## 2019-10-02 VITALS — SYSTOLIC BLOOD PRESSURE: 108 MMHG | DIASTOLIC BLOOD PRESSURE: 68 MMHG

## 2019-10-02 DIAGNOSIS — I47.10 SVT (SUPRAVENTRICULAR TACHYCARDIA): ICD-10-CM

## 2019-10-02 DIAGNOSIS — R00.0 TACHYCARDIA: ICD-10-CM

## 2019-10-02 PROCEDURE — 93224 XTRNL ECG REC UP TO 48 HRS: CPT | Performed by: INTERNAL MEDICINE

## 2019-10-04 DIAGNOSIS — I95.1 ORTHOSTATIC HYPOTENSION: ICD-10-CM

## 2019-10-04 RX ORDER — MIDODRINE HYDROCHLORIDE 5 MG/1
15 TABLET ORAL 2 TIMES DAILY
Qty: 180 TAB | Refills: 0 | Status: SHIPPED | OUTPATIENT
Start: 2019-10-04 | End: 2019-11-07 | Stop reason: SDUPTHER

## 2019-10-07 ENCOUNTER — OFFICE VISIT (OUTPATIENT)
Dept: ENDOCRINOLOGY | Facility: MEDICAL CENTER | Age: 69
End: 2019-10-07
Payer: MEDICARE

## 2019-10-07 VITALS
WEIGHT: 195 LBS | HEART RATE: 100 BPM | OXYGEN SATURATION: 93 % | SYSTOLIC BLOOD PRESSURE: 108 MMHG | HEIGHT: 75 IN | DIASTOLIC BLOOD PRESSURE: 66 MMHG | BODY MASS INDEX: 24.25 KG/M2

## 2019-10-07 DIAGNOSIS — Z79.4 ENCOUNTER FOR LONG-TERM (CURRENT) USE OF INSULIN (HCC): ICD-10-CM

## 2019-10-07 DIAGNOSIS — E11.8 TYPE 2 DIABETES MELLITUS WITH COMPLICATION, WITHOUT LONG-TERM CURRENT USE OF INSULIN (HCC): ICD-10-CM

## 2019-10-07 PROCEDURE — 99214 OFFICE O/P EST MOD 30 MIN: CPT | Performed by: PHYSICIAN ASSISTANT

## 2019-10-07 NOTE — PROGRESS NOTES
Return to office Patient Consult Note  Referred by: Malissa Thomson M.D.    Reason for consult: Diabetes Management Type 2    HPI:  Francesco Schulte is a 69 y.o. old patient who is seeing us today for diabetes care.  This is a pleasant patient with diabetes and I appreciate the opportunity to participate in the care of this patient.    Labs of 6/18/2019 HbA1c is 8.5  Labs of 2/8/19 GFR >60, HbA1c is 8.8    BG Diary:10/7/2019  In the AM: 96, 106, 130, 103, 127, 111  Before Bed: 115, 139, 162, 115    This patient has a complex HX with Agent Orange thrown in he states.      On 5/6/19 weight was 219 and today 195      1. Type 2 diabetes mellitus with complication, without long-term current use of insulin (HCC)  This is a new patient with me on 5/6/2019  They are on:  1.  Lantus/ Humulin N  2.  Glipizide 10mg twice a day     STOP:  1.  Lantus/ Humulin N  2.  Glipizide 10mg twice a day     START:  3.  Tresiba 18 units at night before bed    4.   Novolog 5 units before each meal that you eat (stopped on own)  1.   Ozempic 1.0 once a week   2.   Jardiance 25mg one a day  He is on Prednisone daily (now off)     2. Encounter for long-term (current) use of insulin (HCC)  This is stable today and no new changes are needed or required in today's visit      ROS:   Constitutional: No night sweats.  Eyes:  No visual changes.  Cardiac: No chest pain, No palpitations or racing heart rate.  Resp: No shortness of breath, No cough,   Gi: No Diarrhea    All other systems were reviewed and were/are negative.     Past Medical History:  Patient Active Problem List    Diagnosis Date Noted   • Vertigo 08/09/2018     Priority: High   • Stenosis of right carotid artery-Right CEA 3/21/18 03/21/2018     Priority: High   • Hypercoagulable state (HCC) 03/21/2018     Priority: High   • Dizziness 03/10/2018     Priority: High   • History of ST elevation myocardial infarction (STEMI) 03/17/2017     Priority: High   • Coagulopathy (HCC)  03/13/2017     Priority: High   • Thrombocytosis ([facility]) 03/12/2017     Priority: High   • STEMI (ST elevation myocardial infarction) ([facility]) 03/11/2017     Priority: High   • Chronic headache 03/21/2018     Priority: Medium   • History of stroke- old right parietal/occipital 03/10/2018     Priority: Medium   • Thrombocytopenia ([facility]) 03/10/2018     Priority: Medium   • Leukopenia 03/10/2018     Priority: Medium   • Hypokalemia 03/10/2018     Priority: Medium   • Carotid stenosis, 90% right carotid 03/10/2018     Priority: Medium   • Chronic systolic CHF (congestive heart failure) ([facility]) 03/13/2017     Priority: Medium   • Type 2 diabetes mellitus with complication, without long-term current use of insulin ([facility]) 03/13/2017     Priority: Medium   • Coronary artery disease involving native coronary artery of native heart without angina pectoris 03/13/2017     Priority: Medium   • BPH (benign prostatic hyperplasia) 03/12/2017     Priority: Medium   • Paroxysmal atrial fibrillation ([facility]) 03/12/2017     Priority: Medium   • Polycythemia 12/01/2016     Priority: Medium   • Chronic pain syndrome 09/13/2017     Priority: Low   • SIRS (systemic inflammatory response syndrome) ([facility]) 03/16/2017     Priority: Low   • Dyslipidemia 03/12/2017     Priority: Low   • Encounter for long-term (current) use of insulin ([facility]) 05/06/2019   • Indigestion 04/25/2019   • Polycythemia vera ([facility]) 01/03/2019   • Multiple joint pain 10/03/2018   • Nausea and vomiting 08/09/2018   • Elevated sed rate- R/O PMR 04/04/2018   • New daily persistent headache- R/O PMR; trial steroids 04/04/2018   • Orthostatic hypotension 03/29/2018   • Chronic daily headache 01/09/2018   • Agent orange exposure 09/13/2017   • Risk for falls 09/13/2017   • Long term (current) use of anticoagulants [Z79.01] 08/21/2017   • Diabetes ([facility]) 03/12/2017   • Statin intolerance 03/12/2017       Past Surgical History:  Past Surgical History:   Procedure Laterality Date   • TEMPORAL  ARTERY BIOPSY Right 6/26/2018    Procedure: TEMPORAL ARTERY BIOPSY;  Surgeon: Rajendra Aguilar M.D.;  Location: SURGERY SAME DAY [facility] ORS;  Service: General   • CAROTID ENDARTERECTOMY Right 3/21/2018    Procedure: CAROTID ENDARTERECTOMY- W/EEG MONITORING;  Surgeon: Benny Morfin M.D.;  Location: SURGERY [facility] ORS;  Service: General   • APPENDECTOMY     • CATH PLACEMENT      right arm   • LAMINOTOMY      diskectomy; C4   • OTHER CARDIAC SURGERY      stents x 3       Allergies:  Beta adrenergic blockers; Metformin; Simvastatin; and Statins [hmg-coa-r inhibitors]    Social History:  Social History     Socioeconomic History   • Marital status:      Spouse name: Not on file   • Number of children: Not on file   • Years of education: Not on file   • Highest education level: Not on file   Occupational History   • Not on file   Social Needs   • Financial resource strain: Not on file   • Food insecurity:     Worry: Not on file     Inability: Not on file   • Transportation needs:     Medical: Not on file     Non-medical: Not on file   Tobacco Use   • Smoking status: Never Smoker   • Smokeless tobacco: Never Used   Substance and Sexual Activity   • Alcohol use: No   • Drug use: No   • Sexual activity: Not on file   Lifestyle   • Physical activity:     Days per week: Not on file     Minutes per session: Not on file   • Stress: Not on file   Relationships   • Social connections:     Talks on phone: Not on file     Gets together: Not on file     Attends [employer] service: Not on file     Active member of club or organization: Not on file     Attends meetings of clubs or organizations: Not on file     Relationship status: Not on file   • Intimate partner violence:     Fear of current or ex partner: Not on file     Emotionally abused: Not on file     Physically abused: Not on file     Forced sexual activity: Not on file   Other Topics Concern   • Not on file   Social History Narrative   • Not on file       Family  "History:  History reviewed. No pertinent family history.    Medications:    Current Outpatient Medications:   •  midodrine (PROAMATINE) 5 MG Tab, Take 3 Tabs by mouth 2 times a day., Disp: 180 Tab, Rfl: 0  •  insulin aspart (NOVOLOG) 100 unit/ml injection PEN, Inject 5 Units as instructed 3 times a day before meals., Disp: 15 PEN, Rfl: 3  •  dicyclomine (BENTYL) 10 MG Cap, Take 1 Cap by mouth 4 Times a Day,Before Meals and at Bedtime. for cramps, Disp: 20 Cap, Rfl: 0  •  Insulin Degludec (TRESIBA FLEXTOUCH) 200 UNIT/ML Solution Pen-injector, Inject 50 Units as instructed every bedtime., Disp: 3 PEN, Rfl: 2  •  omeprazole (PRILOSEC OTC) 20 MG tablet, Take 1 Tab by mouth 2 Times a Day., Disp: 60 Tab, Rfl: 3  •  Semaglutide (OZEMPIC) 1 MG/DOSE Solution Pen-injector, Inject 1 mg as instructed every 7 days., Disp: 2 PEN, Rfl: 11  •  JARDIANCE 25 MG Tab, Take 1 tablet by mouth every day., Disp: 30 Tab, Rfl: 3  •  finasteride (PROSCAR) 5 MG Tab, Take 1 Tab by mouth every day., Disp: 90 Tab, Rfl: 3  •  tamsulosin (FLOMAX) 0.4 MG capsule, Take 1 Cap by mouth every evening., Disp: 90 Cap, Rfl: 3  •  apixaban (ELIQUIS) 5mg Tab, Take 1 Tab by mouth 2 Times a Day., Disp: 180 Tab, Rfl: 3  •  aspirin EC (ECOTRIN) 81 MG Tablet Delayed Response, Take 1 Tab by mouth every day., Disp: 30 Tab, Rfl:   •  therapeutic multivitamin-minerals (THERAGRAN-M) Tab, Take 1 Tab by mouth every day., Disp: , Rfl:   •  hydroxyurea (HYDREA) 500 MG Cap, Take 1,000 mg by mouth every morning., Disp: , Rfl:   •  colesevelam (WELCHOL) 625 MG Tab, Take 625 mg by mouth 2 times a day, with meals., Disp: , Rfl:         Physical Examination:   Vital signs: /66 (BP Location: Left arm, Patient Position: Sitting)   Pulse 100   Ht 1.905 m (6' 3\")   Wt 88.5 kg (195 lb)   SpO2 93%   BMI 24.37 kg/m²   General: No distress, cooperative, well dressed and well nourished.   Eyes: No scleral icterus or discharge, No hyposphagma  ENMT: Normal on external " inspection of nose, lips, No nasal drainage   Neck: No abnormal masses on inspection  Resp: Normal effort, Bilateral clear to auscultation, No wheezing, No rales  CVS: Regular rate and rhythm, S1 S2 normal, No murmur. No gallop  Extremities: No edema bilateral extremities  Neuro: Alert and oriented  Skin: No rash, No Ulcers  Psych: Normal mood and affect      Assessment and Plan:    1. Type 2 diabetes mellitus with complication, without long-term current use of insulin (HCC)  START:  3.  Tresiba 18 units at night before bed   (Decrease to 14)  1.   Ozempic 1.0 once a week   2.   Jardiance 25mg one a day      2. Encounter for long-term (current) use of insulin (HCC)  This is stable today and no new changes are needed or required in today's visit    Return in about 3 months (around 1/7/2020).    This patiet during there office visit today was started on a new medication.  Side effects of the new medication were discussed with the patient today in the office.       Thank you kindly for allowing me to participate in the diabetes care plan for this patient.    Naveed Mesa PA-C, BC-ADM  Board Certified - Advanced Diabetes Management  10/07/19    CC:   Malissa Thomson M.D.

## 2019-10-07 NOTE — PATIENT INSTRUCTIONS
START:  3.  Tresiba 18 units at night before bed   (Decrease to 14)  1.   Ozempic 1.0 once a week   2.   Jardiance 25mg one a day

## 2019-10-09 LAB — EKG IMPRESSION: NORMAL

## 2019-10-15 ENCOUNTER — TELEPHONE (OUTPATIENT)
Dept: CARDIOLOGY | Facility: MEDICAL CENTER | Age: 69
End: 2019-10-15

## 2019-10-15 NOTE — TELEPHONE ENCOUNTER
Result Notes for Holter Monitor Study     Notes recorded by Liliana Song M.D. on 10/15/2019 at 12:31 PM PDT  Monitor reviewed.  Overall heart rate is normal.  Patient did have occasional ectopy.  Due to his orthostasis, we cannot recommend any medications for his ectopy.  He should minimize his caffeine intake and stay hydrated.   thank you  AA       [employer] message sent to pt.

## 2019-10-21 ENCOUNTER — PATIENT MESSAGE (OUTPATIENT)
Dept: MEDICAL GROUP | Facility: MEDICAL CENTER | Age: 69
End: 2019-10-21

## 2019-10-21 DIAGNOSIS — M32.9 LUPUS (HCC): ICD-10-CM

## 2019-10-21 NOTE — TELEPHONE ENCOUNTER
From: Francesco Schulte  To: ALVINO Harman  Sent: 10/21/2019 4:08 PM PDT  Subject: Non-Urgent Medical Question       I've been told the wait time it is extremely long hopefully can get in there soon preventing further flare up and having be back in hospital ....maybe appt 2 weeks from now if possible. Can make it I think 2 to 3 weeks. Dr NICOLE Sims was old Dr without solution  ----- Message -----  From: ALVINO Harman  Sent: 10/21/19 4:03 PM  To: Francesco Schulte  Subject: RE: Non-Urgent Medical Question    Absolutely, I will start the referral.    ----- Message -----   From: Francesco Schulte   Sent: 10/21/2019 4:02 PM PDT   To: ALVINO Harman  Subject: Non-Urgent Medical Question    Can you or Dr Thomson give a priority referral to Dr Kiersten Lindsey rheumatologist ref Lupus?    ----- Message -----  From: ALVINO Harman  Sent: 10/21/19 2:39 PM  To: Francesco Schulte  Subject: RE: Non-Urgent Medical Question    I am not aware of anyone that specializes in lupus. I did check with Dr. Thomson but is far as I know she did not know of a specific person either.    ----- Message -----   From: Francesco Schulte   Sent: 10/21/2019 11:35 AM PDT   To: ALVINO Harman  Subject: Non-Urgent Medical Question    Flare up getting worse. Any luck on specialist Lupus Dr in [facility] system?

## 2019-10-21 NOTE — TELEPHONE ENCOUNTER
From: Francesco Schulte  To: ALVINO Harman  Sent: 10/21/2019 4:02 PM PDT  Subject: Non-Urgent Medical Question    Can you or Dr Thomson give a priority referral to Dr Kiersten Lindsey rheumatologist ref Lupus?    ----- Message -----  From: ALVINO Harman  Sent: 10/21/19 2:39 PM  To: Francesco Schulte  Subject: RE: Non-Urgent Medical Question    I am not aware of anyone that specializes in lupus. I did check with Dr. Thomson but is far as I know she did not know of a specific person either.    ----- Message -----   From: Francesco Schulte   Sent: 10/21/2019 11:35 AM PDT   To: ALVINO Harman  Subject: Non-Urgent Medical Question    Flare up getting worse. Any luck on specialist Lupus Dr in [facility] system?

## 2019-10-21 NOTE — TELEPHONE ENCOUNTER
From: Francesco Schulte  To: ALVINO Harman  Sent: 10/21/2019 11:35 AM PDT  Subject: Non-Urgent Medical Question    Flare up getting worse. Any luck on specialist Lupus Dr in [facility] system?

## 2019-10-22 ENCOUNTER — PATIENT MESSAGE (OUTPATIENT)
Dept: MEDICAL GROUP | Facility: MEDICAL CENTER | Age: 69
End: 2019-10-22

## 2019-10-22 DIAGNOSIS — M32.9 LUPUS (HCC): ICD-10-CM

## 2019-10-23 ENCOUNTER — PATIENT MESSAGE (OUTPATIENT)
Dept: MEDICAL GROUP | Facility: MEDICAL CENTER | Age: 69
End: 2019-10-23

## 2019-10-24 ENCOUNTER — TELEPHONE (OUTPATIENT)
Dept: DERMATOLOGY | Facility: IMAGING CENTER | Age: 69
End: 2019-10-24

## 2019-10-25 NOTE — TELEPHONE ENCOUNTER
Tried calling patient twice to give information on Dr. Dao contact information and number to Dr Kendrick new office . I will try again on monday

## 2019-10-30 ENCOUNTER — TELEPHONE (OUTPATIENT)
Dept: DERMATOLOGY | Facility: IMAGING CENTER | Age: 69
End: 2019-10-30

## 2019-10-30 RX ORDER — PREDNISONE 10 MG/1
TABLET ORAL
Qty: 45 TAB | Refills: 0 | Status: SHIPPED | OUTPATIENT
Start: 2019-10-30 | End: 2020-07-01

## 2019-10-30 NOTE — TELEPHONE ENCOUNTER
----- Message from Francesco Schulte sent at 10/29/2019  5:02 PM PDT -----  Regarding: Prescription Question  Contact: [phone]  Can you please renew Prednisone 10mg prescription at [facility]. Cant get Rheumatology till Dec 9. It will slow it down.  Thank you

## 2019-11-15 ENCOUNTER — OFFICE VISIT (OUTPATIENT)
Dept: MEDICAL GROUP | Facility: MEDICAL CENTER | Age: 69
End: 2019-11-15
Payer: MEDICARE

## 2019-11-15 VITALS
HEIGHT: 75 IN | BODY MASS INDEX: 23 KG/M2 | DIASTOLIC BLOOD PRESSURE: 78 MMHG | WEIGHT: 185 LBS | OXYGEN SATURATION: 97 % | RESPIRATION RATE: 16 BRPM | SYSTOLIC BLOOD PRESSURE: 110 MMHG | HEART RATE: 100 BPM | TEMPERATURE: 97.8 F

## 2019-11-15 DIAGNOSIS — I48.0 PAROXYSMAL ATRIAL FIBRILLATION (HCC): ICD-10-CM

## 2019-11-15 DIAGNOSIS — M32.9 LUPUS (HCC): ICD-10-CM

## 2019-11-15 DIAGNOSIS — E11.8 TYPE 2 DIABETES MELLITUS WITH COMPLICATION, WITHOUT LONG-TERM CURRENT USE OF INSULIN (HCC): ICD-10-CM

## 2019-11-15 DIAGNOSIS — I95.1 ORTHOSTATIC HYPOTENSION: ICD-10-CM

## 2019-11-15 DIAGNOSIS — D75.1 POLYCYTHEMIA: ICD-10-CM

## 2019-11-15 PROBLEM — E11.9 DIABETES (HCC): Status: RESOLVED | Noted: 2017-03-12 | Resolved: 2019-11-15

## 2019-11-15 PROCEDURE — 99214 OFFICE O/P EST MOD 30 MIN: CPT | Performed by: NURSE PRACTITIONER

## 2019-11-15 NOTE — ASSESSMENT & PLAN NOTE
Chronic issue followed by endocrinology provider Naveed Mesa  Last a1c 9.3. challenge getting his glucose controlled d/t chronic corticosteroid use. Pt states his diabetes is a secondary concern as the lupus is causing him so much difficulty right now. He has been encouraged by his specialist to high carb diet to stabilize his weight.

## 2019-11-15 NOTE — ASSESSMENT & PLAN NOTE
Pt continues to have substantial difficulty with fatigue, muscle atrophy, weight loss   Now being followed by Dr. Quiroz  H/o allergy to plaquenil  Now will be starting back on prednisone, folic acid, methotrexate

## 2019-11-15 NOTE — PROGRESS NOTES
Subjective:     Chief Complaint   Patient presents with   • Follow-Up     LUPUS     Francesco Schulte is a 69 y.o. male established patient here to follow-up on:    Polycythemia  Followed by oncology, currently managed with hydroxyurea and stable    Lupus (HCC)  Pt continues to have substantial difficulty with fatigue, muscle atrophy, weight loss   Now being followed by Dr. Quiroz  H/o allergy to plaquenil  Now will be starting back on prednisone, folic acid, methotrexate    Type 2 diabetes mellitus with complication, without long-term current use of insulin (HCC)  Chronic issue followed by endocrinology provider Naveed Mesa  Last a1c 9.3. challenge getting his glucose controlled d/t chronic corticosteroid use. Pt states his diabetes is a secondary concern as the lupus is causing him so much difficulty right now. He has been encouraged by his specialist to high carb diet to stabilize his weight.    Paroxysmal atrial fibrillation (HCC)  Patient reports having had a monitor test done that did not show any episodes of A. fib.  He has, however, been recommended to stay on his Eliquis for now.  He denies any sensation of palpitation, dizziness, shortness of breath.  His blood pressure has been improved recently       Current medicines (including changes today)  Current Outpatient Medications   Medication Sig Dispense Refill   • midodrine (PROAMATINE) 5 MG Tab Take 3 Tabs by mouth 2 times a day. 180 Tab 0   • apixaban (ELIQUIS) 5mg Tab Take 1 Tab by mouth 2 Times a Day. 180 Tab 3   • predniSONE (DELTASONE) 10 MG Tab 1 tablet daily 45 Tab 0   • insulin aspart (NOVOLOG) 100 unit/ml injection PEN Inject 5 Units as instructed 3 times a day before meals. 15 PEN 3   • dicyclomine (BENTYL) 10 MG Cap Take 1 Cap by mouth 4 Times a Day,Before Meals and at Bedtime. for cramps 20 Cap 0   • Insulin Degludec (TRESIBA FLEXTOUCH) 200 UNIT/ML Solution Pen-injector Inject 50 Units as instructed every bedtime. 3 PEN 2   • omeprazole  "(PRILOSEC OTC) 20 MG tablet Take 1 Tab by mouth 2 Times a Day. 60 Tab 3   • Semaglutide (OZEMPIC) 1 MG/DOSE Solution Pen-injector Inject 1 mg as instructed every 7 days. 2 PEN 11   • JARDIANCE 25 MG Tab Take 1 tablet by mouth every day. 30 Tab 3   • finasteride (PROSCAR) 5 MG Tab Take 1 Tab by mouth every day. 90 Tab 3   • tamsulosin (FLOMAX) 0.4 MG capsule Take 1 Cap by mouth every evening. 90 Cap 3   • aspirin EC (ECOTRIN) 81 MG Tablet Delayed Response Take 1 Tab by mouth every day. 30 Tab    • therapeutic multivitamin-minerals (THERAGRAN-M) Tab Take 1 Tab by mouth every day.     • hydroxyurea (HYDREA) 500 MG Cap Take 1,000 mg by mouth every morning.     • colesevelam (WELCHOL) 625 MG Tab Take 625 mg by mouth 2 times a day, with meals.       No current facility-administered medications for this visit.      He  has a past medical history of Anesthesia, Cancer ([facility]), Diabetes ([facility]), Family history of punctured lung (2002), Heart attack ([facility]) (03/2017), Hemorrhagic disorder ([facility]), High cholesterol, Ischemic cardiomyopathy, Kidney stones, Orthostatic hypotension (3/29/2018), Pain, Polycythemia vera(238.4), Stroke ([facility]) (2016), Urinary bladder disorder, and Vertigo.    ROS included above     Objective:     /78 (BP Location: Left arm, Patient Position: Sitting, BP Cuff Size: Adult)   Pulse 100   Temp 36.6 °C (97.8 °F) (Temporal)   Resp 16   Ht 1.905 m (6' 3\")   Wt 83.9 kg (185 lb)   SpO2 97%  Body mass index is 23.12 kg/m².     Physical Exam:  General: Alert, oriented in no acute distress.  Eye contact is good, speech is normal, affect calm  Lungs: clear to auscultation bilaterally, normal effort, no wheeze/ rhonchi/ rales.  CV: regular rate and rhythm, S1, S2, no murmur  Ext: no edema, color normal, vascularity normal, temperature normal    Assessment and Plan:   The following treatment plan was discussed   1. Polycythemia   reportedly stable on hydroxyurea, followed by oncology   2. Lupus (HCC)   patient " has had substantial difficulty since diagnosis.  Continues to have muscle atrophy, with generalized weakness, weight loss.  He has recently met with Dr. Horan and will be changing his treatment plan, he is hopeful that this will help to stabilize symptoms   3. Type 2 diabetes mellitus with complication, without long-term current use of insulin (HCC)   last A1c uncontrolled at 9.3.  He is working with endocrinology   4. Paroxysmal atrial fibrillation (HCC)   stable, no recent symptoms.  On anticoagulation therapy       Followup: 3 months         Please note that this dictation was created using voice recognition software. I have worked with consultants from the vendor as well as technical experts from [employer] [facility] [employer] to optimize the interface. I have made every reasonable attempt to correct obvious errors, but I expect that there are errors of grammar and possibly content that I did not discover before finalizing the note.

## 2019-11-26 RX ORDER — MIDODRINE HYDROCHLORIDE 5 MG/1
15 TABLET ORAL 2 TIMES DAILY
Qty: 180 TAB | Refills: 0 | Status: SHIPPED | OUTPATIENT
Start: 2019-11-26 | End: 2019-12-05 | Stop reason: SDUPTHER

## 2019-12-05 DIAGNOSIS — I95.1 ORTHOSTATIC HYPOTENSION: ICD-10-CM

## 2019-12-05 RX ORDER — TAMSULOSIN HYDROCHLORIDE 0.4 MG/1
0.4 CAPSULE ORAL EVERY EVENING
Qty: 90 CAP | Refills: 3 | Status: SHIPPED | OUTPATIENT
Start: 2019-12-05 | End: 2020-07-01

## 2019-12-10 RX ORDER — MIDODRINE HYDROCHLORIDE 5 MG/1
15 TABLET ORAL 2 TIMES DAILY
Qty: 540 TAB | Refills: 0 | Status: SHIPPED | OUTPATIENT
Start: 2019-12-10 | End: 2020-04-16

## 2019-12-17 RX ORDER — BLOOD SUGAR DIAGNOSTIC
STRIP MISCELLANEOUS
Qty: 100 STRIP | Refills: 11 | Status: SHIPPED | OUTPATIENT
Start: 2019-12-17 | End: 2020-07-01

## 2019-12-20 ENCOUNTER — HOSPITAL ENCOUNTER (OUTPATIENT)
Dept: LAB | Facility: MEDICAL CENTER | Age: 69
End: 2019-12-20
Attending: INTERNAL MEDICINE
Payer: MEDICARE

## 2019-12-20 LAB
ALBUMIN SERPL BCP-MCNC: 4 G/DL (ref 3.2–4.9)
ALBUMIN/GLOB SERPL: 1.5 G/DL
ALP SERPL-CCNC: 48 U/L (ref 30–99)
ALT SERPL-CCNC: 15 U/L (ref 2–50)
ANION GAP SERPL CALC-SCNC: 8 MMOL/L (ref 0–11.9)
AST SERPL-CCNC: 14 U/L (ref 12–45)
BASOPHILS # BLD AUTO: 0.9 % (ref 0–1.8)
BASOPHILS # BLD: 0.06 K/UL (ref 0–0.12)
BILIRUB SERPL-MCNC: 0.8 MG/DL (ref 0.1–1.5)
BUN SERPL-MCNC: 20 MG/DL (ref 8–22)
CALCIUM SERPL-MCNC: 9.2 MG/DL (ref 8.5–10.5)
CHLORIDE SERPL-SCNC: 102 MMOL/L (ref 96–112)
CO2 SERPL-SCNC: 27 MMOL/L (ref 20–33)
CREAT SERPL-MCNC: 0.92 MG/DL (ref 0.5–1.4)
CRP SERPL HS-MCNC: 0.2 MG/DL (ref 0–0.75)
EOSINOPHIL # BLD AUTO: 0 K/UL (ref 0–0.51)
EOSINOPHIL NFR BLD: 0 % (ref 0–6.9)
ERYTHROCYTE [DISTWIDTH] IN BLOOD BY AUTOMATED COUNT: 67.8 FL (ref 35.9–50)
ERYTHROCYTE [SEDIMENTATION RATE] IN BLOOD BY WESTERGREN METHOD: 6 MM/HOUR (ref 0–20)
GLOBULIN SER CALC-MCNC: 2.7 G/DL (ref 1.9–3.5)
GLUCOSE SERPL-MCNC: 216 MG/DL (ref 65–99)
HCT VFR BLD AUTO: 44 % (ref 42–52)
HGB BLD-MCNC: 13.1 G/DL (ref 14–18)
LYMPHOCYTES # BLD AUTO: 0.32 K/UL (ref 1–4.8)
LYMPHOCYTES NFR BLD: 4.4 % (ref 22–41)
MANUAL DIFF BLD: NORMAL
MCH RBC QN AUTO: 26.4 PG (ref 27–33)
MCHC RBC AUTO-ENTMCNC: 29.8 G/DL (ref 33.7–35.3)
MCV RBC AUTO: 88.5 FL (ref 81.4–97.8)
MONOCYTES # BLD AUTO: 0 K/UL (ref 0–0.85)
MONOCYTES NFR BLD AUTO: 0 % (ref 0–13.4)
MORPHOLOGY BLD-IMP: NORMAL
NEUTROPHILS # BLD AUTO: 6.82 K/UL (ref 1.82–7.42)
NEUTROPHILS NFR BLD: 94.7 % (ref 44–72)
NRBC # BLD AUTO: 0 K/UL
NRBC BLD-RTO: 0 /100 WBC
OVALOCYTES BLD QL SMEAR: NORMAL
PLATELET # BLD AUTO: 412 K/UL (ref 164–446)
PLATELET BLD QL SMEAR: NORMAL
PMV BLD AUTO: 11.6 FL (ref 9–12.9)
POIKILOCYTOSIS BLD QL SMEAR: NORMAL
POTASSIUM SERPL-SCNC: 3.7 MMOL/L (ref 3.6–5.5)
PROT SERPL-MCNC: 6.7 G/DL (ref 6–8.2)
RBC # BLD AUTO: 4.97 M/UL (ref 4.7–6.1)
RBC BLD AUTO: PRESENT
SODIUM SERPL-SCNC: 137 MMOL/L (ref 135–145)
WBC # BLD AUTO: 7.2 K/UL (ref 4.8–10.8)

## 2019-12-20 PROCEDURE — 85027 COMPLETE CBC AUTOMATED: CPT

## 2019-12-20 PROCEDURE — 85652 RBC SED RATE AUTOMATED: CPT

## 2019-12-20 PROCEDURE — 80053 COMPREHEN METABOLIC PANEL: CPT

## 2019-12-20 PROCEDURE — 36415 COLL VENOUS BLD VENIPUNCTURE: CPT

## 2019-12-20 PROCEDURE — 86140 C-REACTIVE PROTEIN: CPT

## 2019-12-20 PROCEDURE — 85007 BL SMEAR W/DIFF WBC COUNT: CPT

## 2020-01-04 ENCOUNTER — PATIENT MESSAGE (OUTPATIENT)
Dept: MEDICAL GROUP | Facility: MEDICAL CENTER | Age: 70
End: 2020-01-04

## 2020-01-04 RX ORDER — TAMSULOSIN HYDROCHLORIDE 0.4 MG/1
0.8 CAPSULE ORAL
Qty: 180 CAP | Refills: 3 | Status: SHIPPED | OUTPATIENT
Start: 2020-01-04 | End: 2020-07-01

## 2020-01-05 NOTE — TELEPHONE ENCOUNTER
From: Francesco Schulte  To: ALVINO Harman  Sent: 1/4/2020 10:43 AM PST  Subject: Prescription Question    Can you up the Tamsulosin prescription at [facility] to 60 per month..The methotrexate is slowing urination during Lupus treatments. Keep running out. Been on this Flomax for over 20 years and works fine.  Happy New Year  Thank you

## 2020-01-07 ENCOUNTER — OFFICE VISIT (OUTPATIENT)
Dept: ENDOCRINOLOGY | Facility: MEDICAL CENTER | Age: 70
End: 2020-01-07
Payer: MEDICARE

## 2020-01-07 VITALS
SYSTOLIC BLOOD PRESSURE: 118 MMHG | BODY MASS INDEX: 23.25 KG/M2 | DIASTOLIC BLOOD PRESSURE: 62 MMHG | HEART RATE: 90 BPM | OXYGEN SATURATION: 93 % | HEIGHT: 75 IN | WEIGHT: 187 LBS

## 2020-01-07 DIAGNOSIS — Z79.4 ENCOUNTER FOR LONG-TERM (CURRENT) USE OF INSULIN (HCC): ICD-10-CM

## 2020-01-07 DIAGNOSIS — E11.8 TYPE 2 DIABETES MELLITUS WITH COMPLICATION, WITHOUT LONG-TERM CURRENT USE OF INSULIN (HCC): ICD-10-CM

## 2020-01-07 LAB
HBA1C MFR BLD: 8.6 % (ref 0–5.6)
INT CON NEG: NEGATIVE
INT CON POS: POSITIVE

## 2020-01-07 PROCEDURE — 83036 HEMOGLOBIN GLYCOSYLATED A1C: CPT | Performed by: PHYSICIAN ASSISTANT

## 2020-01-07 PROCEDURE — 99214 OFFICE O/P EST MOD 30 MIN: CPT | Performed by: PHYSICIAN ASSISTANT

## 2020-01-07 RX ORDER — EMPAGLIFLOZIN 25 MG/1
1 TABLET, FILM COATED ORAL DAILY
Qty: 30 TAB | Refills: 11 | Status: SHIPPED | OUTPATIENT
Start: 2020-01-07 | End: 2020-07-01

## 2020-01-07 NOTE — PROGRESS NOTES
Return to office Patient Consult Note  Referred by: Malissa Thomson M.D.    Reason for consult: Diabetes Management Type 2    HPI:  Francesco Schulte is a 69 y.o. old patient who is seeing us today for diabetes care.  This is a pleasant patient with diabetes and I appreciate the opportunity to participate in the care of this patient.    Labs of 1/7/2020 HbA1c is   Labs of 6/18/2019 HbA1c is 8.5  Labs of 2/8/19 GFR >60, HbA1c is 8.8    BG Diary:1/7/2020  In the AM: no log    This patient has a complex HX with Agent Orange thrown in he states.      On 5/6/19 weight was 219 and today 195    [facility]      1. Type 2 diabetes mellitus with complication, without long-term current use of insulin (HCC)  This is a new patient with me on 5/6/2019  They are on:  1.  Lantus/ Humulin N  2.  Glipizide 10mg twice a day     STOP:  1.  Lantus/ Humulin N  2.  Glipizide 10mg twice a day     Now on:  3.   Tresiba 18 units at night before bed    4.   Novolog 8 units before each meal that you eat (Restarted two months ago  1.   Ozempic 1.0 once a week (stopped on own cost )  2.   Jardiance 25mg one a day (stopped on own)  He has been on Prednisone daily (now off)     2. Encounter for long-term (current) use of insulin (HCC)  This is stable today and no new changes are needed or required in today's visit        ROS:   Constitutional: No night sweats.  Eyes:  No visual changes.  Cardiac: No chest pain, No palpitations or racing heart rate.  Resp: No shortness of breath, No cough,   Gi: No Diarrhea    All other systems were reviewed and were/are negative.      Past Medical History:  Patient Active Problem List    Diagnosis Date Noted   • Vertigo 08/09/2018     Priority: High   • Stenosis of right carotid artery-Right CEA 3/21/18 03/21/2018     Priority: High   • Hypercoagulable state (HCC) 03/21/2018     Priority: High   • Dizziness 03/10/2018     Priority: High   • History of ST  elevation myocardial infarction (STEMI) 03/17/2017     Priority: High   • Coagulopathy ([facility]) 03/13/2017     Priority: High   • Thrombocytosis ([facility]) 03/12/2017     Priority: High   • STEMI (ST elevation myocardial infarction) ([facility]) 03/11/2017     Priority: High   • Chronic headache 03/21/2018     Priority: Medium   • History of stroke- old right parietal/occipital 03/10/2018     Priority: Medium   • Thrombocytopenia ([facility]) 03/10/2018     Priority: Medium   • Leukopenia 03/10/2018     Priority: Medium   • Hypokalemia 03/10/2018     Priority: Medium   • Carotid stenosis, 90% right carotid 03/10/2018     Priority: Medium   • Chronic systolic CHF (congestive heart failure) ([facility]) 03/13/2017     Priority: Medium   • Type 2 diabetes mellitus with complication, without long-term current use of insulin ([facility]) 03/13/2017     Priority: Medium   • Coronary artery disease involving native coronary artery of native heart without angina pectoris 03/13/2017     Priority: Medium   • BPH (benign prostatic hyperplasia) 03/12/2017     Priority: Medium   • Paroxysmal atrial fibrillation ([facility]) 03/12/2017     Priority: Medium   • Polycythemia 12/01/2016     Priority: Medium   • Chronic pain syndrome 09/13/2017     Priority: Low   • SIRS (systemic inflammatory response syndrome) ([facility]) 03/16/2017     Priority: Low   • Dyslipidemia 03/12/2017     Priority: Low   • Lupus ([facility]) 11/15/2019   • Encounter for long-term (current) use of insulin ([facility]) 05/06/2019   • Indigestion 04/25/2019   • Polycythemia vera ([facility]) 01/03/2019   • Multiple joint pain 10/03/2018   • Nausea and vomiting 08/09/2018   • Elevated sed rate- R/O PMR 04/04/2018   • New daily persistent headache- R/O PMR; trial steroids 04/04/2018   • Orthostatic hypotension 03/29/2018   • Chronic daily headache 01/09/2018   • Agent orange exposure 09/13/2017   • Risk for falls 09/13/2017   • Long term (current) use of anticoagulants [Z79.01] 08/21/2017   • Statin intolerance 03/12/2017        Past Surgical History:  Past Surgical History:   Procedure Laterality Date   • TEMPORAL ARTERY BIOPSY Right 6/26/2018    Procedure: TEMPORAL ARTERY BIOPSY;  Surgeon: Rajendra Aguilar M.D.;  Location: SURGERY SAME DAY [facility];  Service: General   • CAROTID ENDARTERECTOMY Right 3/21/2018    Procedure: CAROTID ENDARTERECTOMY- W/EEG MONITORING;  Surgeon: Benny Morfin M.D.;  Location: SURGERY [facility];  Service: General   • APPENDECTOMY     • CATH PLACEMENT      right arm   • LAMINOTOMY      diskectomy; C4   • OTHER CARDIAC SURGERY      stents x 3       Allergies:  Beta adrenergic blockers; Metformin; Simvastatin; and Statins [hmg-coa-r inhibitors]    Social History:  Social History     Socioeconomic History   • Marital status:      Spouse name: Not on file   • Number of children: Not on file   • Years of education: Not on file   • Highest education level: Not on file   Occupational History   • Not on file   Social Needs   • Financial resource strain: Not on file   • Food insecurity:     Worry: Not on file     Inability: Not on file   • Transportation needs:     Medical: Not on file     Non-medical: Not on file   Tobacco Use   • Smoking status: Never Smoker   • Smokeless tobacco: Never Used   Substance and Sexual Activity   • Alcohol use: No   • Drug use: No   • Sexual activity: Not on file   Lifestyle   • Physical activity:     Days per week: Not on file     Minutes per session: Not on file   • Stress: Not on file   Relationships   • Social connections:     Talks on phone: Not on file     Gets together: Not on file     Attends [employer] service: Not on file     Active member of club or organization: Not on file     Attends meetings of clubs or organizations: Not on file     Relationship status: Not on file   • Intimate partner violence:     Fear of current or ex partner: Not on file     Emotionally abused: Not on file     Physically abused: Not on file     Forced sexual activity: Not on file    Other Topics Concern   • Not on file   Social History Narrative   • Not on file       Family History:  History reviewed. No pertinent family history.    Medications:    Current Outpatient Medications:   •  JARDIANCE 25 MG Tab, Take 1 tablet by mouth every day., Disp: 30 Tab, Rfl: 11  •  Dulaglutide (TRULICITY) 1.5 MG/0.5ML Solution Pen-injector, Inject 0.5 mL as instructed every 7 days., Disp: 4 PEN, Rfl: 6  •  tamsulosin (FLOMAX) 0.4 MG capsule, Take 2 Caps by mouth ONE-HALF HOUR AFTER BREAKFAST., Disp: 180 Cap, Rfl: 3  •  ONETOUCH VERIO strip, ONETOUCH VERIO TEST STRIPS FOR GLUCOSE MONITORING USE TO TEST 3 TIMES A DAYE11.65, Disp: 100 Strip, Rfl: 11  •  midodrine (PROAMATINE) 5 MG Tab, Take 3 Tabs by mouth 2 times a day., Disp: 540 Tab, Rfl: 0  •  tamsulosin (FLOMAX) 0.4 MG capsule, TAKE 1 CAP BY MOUTH EVERY EVENING., Disp: 90 Cap, Rfl: 3  •  apixaban (ELIQUIS) 5mg Tab, Take 1 Tab by mouth 2 Times a Day., Disp: 180 Tab, Rfl: 3  •  predniSONE (DELTASONE) 10 MG Tab, 1 tablet daily, Disp: 45 Tab, Rfl: 0  •  insulin aspart (NOVOLOG) 100 unit/ml injection PEN, Inject 5 Units as instructed 3 times a day before meals., Disp: 15 PEN, Rfl: 3  •  dicyclomine (BENTYL) 10 MG Cap, Take 1 Cap by mouth 4 Times a Day,Before Meals and at Bedtime. for cramps, Disp: 20 Cap, Rfl: 0  •  Insulin Degludec (TRESIBA FLEXTOUCH) 200 UNIT/ML Solution Pen-injector, Inject 50 Units as instructed every bedtime., Disp: 3 PEN, Rfl: 2  •  omeprazole (PRILOSEC OTC) 20 MG tablet, Take 1 Tab by mouth 2 Times a Day., Disp: 60 Tab, Rfl: 3  •  finasteride (PROSCAR) 5 MG Tab, Take 1 Tab by mouth every day., Disp: 90 Tab, Rfl: 3  •  aspirin EC (ECOTRIN) 81 MG Tablet Delayed Response, Take 1 Tab by mouth every day., Disp: 30 Tab, Rfl:   •  therapeutic multivitamin-minerals (THERAGRAN-M) Tab, Take 1 Tab by mouth every day., Disp: , Rfl:   •  hydroxyurea (HYDREA) 500 MG Cap, Take 1,000 mg by mouth every morning., Disp: , Rfl:   •  colesevelam (WELCHOL) 625  "MG Tab, Take 625 mg by mouth 2 times a day, with meals., Disp: , Rfl:         Physical Examination:   Vital signs: /62 (BP Location: Left arm, Patient Position: Sitting)   Pulse 90   Ht 1.905 m (6' 3\")   Wt 84.8 kg (187 lb)   SpO2 93%   BMI 23.37 kg/m²   General: No distress, cooperative, well dressed and well nourished.   Eyes: No scleral icterus or discharge, No hyposphagma  ENMT: Normal on external inspection of nose, lips, No nasal drainage   Neck: No abnormal masses on inspection  Resp: Normal effort, Bilateral clear to auscultation, No wheezing, No rales  CVS: Regular rate and rhythm, S1 S2 normal, No murmur. No gallop  Extremities: No edema bilateral extremities  Neuro: Alert and oriented  Skin: No rash, No Ulcers  Psych: Normal mood and affect      Assessment and Plan:    1. Type 2 diabetes mellitus with complication, without long-term current use of insulin (HCC)  Now on:  3.   Tresiba 18 units at night before bed    4.   Novolog 8 units before each meal that you eat   Sliding scale for what your BG number is  Start 1:40 above 100  100-140 1 units  141-180 2 units  181-220 3 units  221-260 4 units  261-300 5 units  301-340 6 units  341-380 7 units  381-420 8 units  460-500 9 units  501-540 10 units  Ect…  1.   Trulicity 0.75 once a week for two weeks then increase to 1.5  2.   Jardiance 25mg one a day (Restart)  He has been on Prednisone daily (now off)     2. Encounter for long-term (current) use of insulin (HCC)  Is on a high risk medication Insulin and we will continue to follow     Return in about 1 month (around 2/7/2020).    Thank you kindly for allowing me to participate in the diabetes care plan for this patient.    Naveed Mesa PA-C, BC-ADM  Board Certified - Advanced Diabetes Management  01/07/20    CC:   Malissa Thomson M.D.    "

## 2020-01-07 NOTE — PATIENT INSTRUCTIONS
Now on:  3.   Tresiba 18 units at night before bed    4.   Novolog 8 units before each meal that you eat   Sliding scale for what your BG number is  Start 1:40 above 100  100-140 1 units  141-180 2 units  181-220 3 units  221-260 4 units  261-300 5 units  301-340 6 units  341-380 7 units  381-420 8 units  460-500 9 units  501-540 10 units  Ect…  1.   Trulicity 0.75 once a week for two weeks then increase to 1.5  2.   Jardiance 25mg one a day (Restart)

## 2020-01-10 ENCOUNTER — PATIENT MESSAGE (OUTPATIENT)
Dept: MEDICAL GROUP | Facility: MEDICAL CENTER | Age: 70
End: 2020-01-10

## 2020-01-10 DIAGNOSIS — B35.4 TINEA CORPORIS: ICD-10-CM

## 2020-01-10 RX ORDER — CICLOPIROX 7.7 MG/G
4 GEL TOPICAL 2 TIMES DAILY PRN
Qty: 1 TUBE | Refills: 3 | Status: SHIPPED | OUTPATIENT
Start: 2020-01-10 | End: 2020-01-13 | Stop reason: SDUPTHER

## 2020-01-11 NOTE — TELEPHONE ENCOUNTER
From: Francesco Schulte  To: ALVINO Harman  Sent: 1/10/2020 3:41 PM PST  Subject: Prescription Question    Doctor Johan gave me this cream for fungus when taking prednisone and some of the other prescriptions it worked great. She no longer is with [facility] and is in a different group now. but I need this refilled can you possibly do that? To [facility] [place]?

## 2020-01-13 RX ORDER — CICLOPIROX 7.7 MG/G
4 GEL TOPICAL 2 TIMES DAILY PRN
Qty: 1 TUBE | Refills: 3 | Status: SHIPPED | OUTPATIENT
Start: 2020-01-13 | End: 2020-07-01

## 2020-02-10 ENCOUNTER — OFFICE VISIT (OUTPATIENT)
Dept: ENDOCRINOLOGY | Facility: MEDICAL CENTER | Age: 70
End: 2020-02-10
Payer: MEDICARE

## 2020-02-10 VITALS
HEART RATE: 73 BPM | HEIGHT: 75 IN | DIASTOLIC BLOOD PRESSURE: 70 MMHG | SYSTOLIC BLOOD PRESSURE: 118 MMHG | OXYGEN SATURATION: 96 % | WEIGHT: 195 LBS | BODY MASS INDEX: 24.25 KG/M2

## 2020-02-10 DIAGNOSIS — E11.8 TYPE 2 DIABETES MELLITUS WITH COMPLICATION, WITHOUT LONG-TERM CURRENT USE OF INSULIN (HCC): ICD-10-CM

## 2020-02-10 DIAGNOSIS — Z79.4 ENCOUNTER FOR LONG-TERM (CURRENT) USE OF INSULIN (HCC): ICD-10-CM

## 2020-02-10 PROCEDURE — 99214 OFFICE O/P EST MOD 30 MIN: CPT | Performed by: PHYSICIAN ASSISTANT

## 2020-02-10 NOTE — PROGRESS NOTES
Return to office Patient Consult Note  Referred by: Malissa Thomson M.D.    Reason for consult: Diabetes Management Type 2    HPI:  Francesco Schulte is a 69 y.o. old patient who is seeing us today for diabetes care.  This is a pleasant patient with diabetes and I appreciate the opportunity to participate in the care of this patient.    Labs of 1/7/2020 HbA1c is 8.6  Labs of 6/18/2019 HbA1c is 8.5  Labs of 2/8/19 GFR >60, HbA1c is 8.8    BG Diary:2/10/2020  In the AM: 95, 120. 107, 133, 146    This patient has a complex HX with Agent Orange thrown in he states.      On 5/6/19 weight was 219 and today 195     [facility]      1. Type 2 diabetes mellitus with complication, without long-term current use of insulin (HCC)  This is a new patient with me on 5/6/2019  They are on:  1.  Lantus/ Humulin N  2.  Glipizide 10mg twice a day     STOP:  1.  Lantus/ Humulin N  2.  Glipizide 10mg twice a day     Now on: (he is not taking meds as prescribed)  3.   Tresiba 18 units at night before bed    4.   Novolog 8 units before each meal that you eat (not on)  Sliding scale for what your BG number is  Start 1:40 above 100  100-140 1 units  141-180 2 units  181-220 3 units  221-260 4 units  261-300 5 units  301-340 6 units  341-380 7 units  381-420 8 units  460-500 9 units  501-540 10 units  Ect…  1.   Trulicity 1.5 once a week or Ozempic (not using)  2.   Jardiance 25mg one a day (stopped on own)  He has been on Prednisone daily (now off)     2. Encounter for long-term (current) use of insulin (HCC)  This is stable today and no new changes are needed or required in today's visit         ROS:   Constitutional: No night sweats.  Eyes:  No visual changes.  Cardiac: No chest pain, No palpitations or racing heart rate.  Resp: No shortness of breath, No cough,   Gi: No Diarrhea    All other systems were reviewed and were/are negative.      Past Medical History:  Patient Active  Problem List    Diagnosis Date Noted   • Vertigo 08/09/2018     Priority: High   • Stenosis of right carotid artery-Right CEA 3/21/18 03/21/2018     Priority: High   • Hypercoagulable state ([facility]) 03/21/2018     Priority: High   • Dizziness 03/10/2018     Priority: High   • History of ST elevation myocardial infarction (STEMI) 03/17/2017     Priority: High   • Coagulopathy ([facility]) 03/13/2017     Priority: High   • Thrombocytosis ([facility]) 03/12/2017     Priority: High   • STEMI (ST elevation myocardial infarction) ([facility]) 03/11/2017     Priority: High   • Chronic headache 03/21/2018     Priority: Medium   • History of stroke- old right parietal/occipital 03/10/2018     Priority: Medium   • Thrombocytopenia ([facility]) 03/10/2018     Priority: Medium   • Leukopenia 03/10/2018     Priority: Medium   • Hypokalemia 03/10/2018     Priority: Medium   • Carotid stenosis, 90% right carotid 03/10/2018     Priority: Medium   • Chronic systolic CHF (congestive heart failure) ([facility]) 03/13/2017     Priority: Medium   • Type 2 diabetes mellitus with complication, without long-term current use of insulin ([facility]) 03/13/2017     Priority: Medium   • Coronary artery disease involving native coronary artery of native heart without angina pectoris 03/13/2017     Priority: Medium   • BPH (benign prostatic hyperplasia) 03/12/2017     Priority: Medium   • Paroxysmal atrial fibrillation ([facility]) 03/12/2017     Priority: Medium   • Polycythemia 12/01/2016     Priority: Medium   • Chronic pain syndrome 09/13/2017     Priority: Low   • SIRS (systemic inflammatory response syndrome) ([facility]) 03/16/2017     Priority: Low   • Dyslipidemia 03/12/2017     Priority: Low   • Lupus ([facility]) 11/15/2019   • Encounter for long-term (current) use of insulin ([facility]) 05/06/2019   • Indigestion 04/25/2019   • Polycythemia vera ([facility]) 01/03/2019   • Multiple joint pain 10/03/2018   • Nausea and vomiting 08/09/2018   • Elevated sed rate- R/O PMR 04/04/2018   • New daily persistent  headache- R/O PMR; trial steroids 04/04/2018   • Orthostatic hypotension 03/29/2018   • Chronic daily headache 01/09/2018   • Agent orange exposure 09/13/2017   • Risk for falls 09/13/2017   • Long term (current) use of anticoagulants [Z79.01] 08/21/2017   • Statin intolerance 03/12/2017       Past Surgical History:  Past Surgical History:   Procedure Laterality Date   • TEMPORAL ARTERY BIOPSY Right 6/26/2018    Procedure: TEMPORAL ARTERY BIOPSY;  Surgeon: Rajendra Aguilar M.D.;  Location: SURGERY SAME DAY [facility];  Service: General   • CAROTID ENDARTERECTOMY Right 3/21/2018    Procedure: CAROTID ENDARTERECTOMY- W/EEG MONITORING;  Surgeon: Benny Morfin M.D.;  Location: SURGERY [facility];  Service: General   • APPENDECTOMY     • CATH PLACEMENT      right arm   • LAMINOTOMY      diskectomy; C4   • OTHER CARDIAC SURGERY      stents x 3       Allergies:  Beta adrenergic blockers; Metformin; Simvastatin; and Statins [hmg-coa-r inhibitors]    Social History:  Social History     Socioeconomic History   • Marital status:      Spouse name: Not on file   • Number of children: Not on file   • Years of education: Not on file   • Highest education level: Not on file   Occupational History   • Not on file   Social Needs   • Financial resource strain: Not on file   • Food insecurity:     Worry: Not on file     Inability: Not on file   • Transportation needs:     Medical: Not on file     Non-medical: Not on file   Tobacco Use   • Smoking status: Never Smoker   • Smokeless tobacco: Never Used   Substance and Sexual Activity   • Alcohol use: No   • Drug use: No   • Sexual activity: Not on file   Lifestyle   • Physical activity:     Days per week: Not on file     Minutes per session: Not on file   • Stress: Not on file   Relationships   • Social connections:     Talks on phone: Not on file     Gets together: Not on file     Attends Hindu service: Not on file     Active member of club or organization: Not on file      Attends meetings of clubs or organizations: Not on file     Relationship status: Not on file   • Intimate partner violence:     Fear of current or ex partner: Not on file     Emotionally abused: Not on file     Physically abused: Not on file     Forced sexual activity: Not on file   Other Topics Concern   • Not on file   Social History Narrative   • Not on file       Family History:  History reviewed. No pertinent family history.    Medications:    Current Outpatient Medications:   •  Ciclopirox 0.77 % Gel, 4 g by Apply externally route 2 times a day as needed., Disp: 1 Tube, Rfl: 3  •  JARDIANCE 25 MG Tab, Take 1 tablet by mouth every day., Disp: 30 Tab, Rfl: 11  •  Dulaglutide (TRULICITY) 1.5 MG/0.5ML Solution Pen-injector, Inject 0.5 mL as instructed every 7 days., Disp: 4 PEN, Rfl: 6  •  tamsulosin (FLOMAX) 0.4 MG capsule, Take 2 Caps by mouth ONE-HALF HOUR AFTER BREAKFAST., Disp: 180 Cap, Rfl: 3  •  ONETOUCH VERIO strip, ONETOUCH VERIO TEST STRIPS FOR GLUCOSE MONITORING USE TO TEST 3 TIMES A DAYE11.65, Disp: 100 Strip, Rfl: 11  •  midodrine (PROAMATINE) 5 MG Tab, Take 3 Tabs by mouth 2 times a day., Disp: 540 Tab, Rfl: 0  •  tamsulosin (FLOMAX) 0.4 MG capsule, TAKE 1 CAP BY MOUTH EVERY EVENING., Disp: 90 Cap, Rfl: 3  •  apixaban (ELIQUIS) 5mg Tab, Take 1 Tab by mouth 2 Times a Day., Disp: 180 Tab, Rfl: 3  •  predniSONE (DELTASONE) 10 MG Tab, 1 tablet daily, Disp: 45 Tab, Rfl: 0  •  insulin aspart (NOVOLOG) 100 unit/ml injection PEN, Inject 5 Units as instructed 3 times a day before meals., Disp: 15 PEN, Rfl: 3  •  dicyclomine (BENTYL) 10 MG Cap, Take 1 Cap by mouth 4 Times a Day,Before Meals and at Bedtime. for cramps, Disp: 20 Cap, Rfl: 0  •  Insulin Degludec (TRESIBA FLEXTOUCH) 200 UNIT/ML Solution Pen-injector, Inject 50 Units as instructed every bedtime., Disp: 3 PEN, Rfl: 2  •  omeprazole (PRILOSEC OTC) 20 MG tablet, Take 1 Tab by mouth 2 Times a Day., Disp: 60 Tab, Rfl: 3  •  finasteride (PROSCAR) 5  "MG Tab, Take 1 Tab by mouth every day., Disp: 90 Tab, Rfl: 3  •  aspirin EC (ECOTRIN) 81 MG Tablet Delayed Response, Take 1 Tab by mouth every day., Disp: 30 Tab, Rfl:   •  therapeutic multivitamin-minerals (THERAGRAN-M) Tab, Take 1 Tab by mouth every day., Disp: , Rfl:   •  hydroxyurea (HYDREA) 500 MG Cap, Take 1,000 mg by mouth every morning., Disp: , Rfl:   •  colesevelam (WELCHOL) 625 MG Tab, Take 625 mg by mouth 2 times a day, with meals., Disp: , Rfl:         Physical Examination:  Vital signs: /70 (BP Location: Left arm, Patient Position: Sitting)   Pulse 73   Ht 1.905 m (6' 3\")   Wt 88.5 kg (195 lb)   SpO2 96%   BMI 24.37 kg/m²   General: No distress, cooperative, well dressed and well nourished.   Eyes: No scleral icterus or discharge, No hyposphagma  ENMT: Normal on external inspection of nose, lips, No nasal drainage   Neck: No abnormal masses on inspection  Resp: Normal effort, Bilateral clear to auscultation, No wheezing, No rales  CVS: Regular rate and rhythm, S1 S2 normal, No murmur. No gallop  Extremities: No edema bilateral extremities  Neuro: Alert and oriented  Skin: No rash, No Ulcers  Psych: Normal mood and affect      Assessment and Plan:    1. Type 2 diabetes mellitus with complication, without long-term current use of insulin (HCC)    Now on: (he is not taking meds as prescribed)  3.   Tresiba 16 units at night before bed    4.   Novolog 4 units before each meal that you eat (not on)  Sliding scale for what your BG number is  Start 1:40 above 100  100-140 1 units  141-180 2 units  181-220 3 units  221-260 4 units  261-300 5 units  301-340 6 units  341-380 7 units  381-420 8 units  460-500 9 units  501-540 10 units  Ect…  1.   Trulicity 1.5 once a week or Ozempic (not using)  2.   Jardiance 25mg one a day (stopped on own)  He has been on Prednisone daily (now off)    2. Encounter for long-term (current) use of insulin (HCC)  This is stable today and no new changes are needed or " required in today's visit    Return in about 3 months (around 5/10/2020).    Thank you kindly for allowing me to participate in the diabetes care plan for this patient.    Naveed Mesa PA-C, BC-[facility]  Board Certified - Advanced Diabetes Management  02/10/20    CC:   Malissa Thomson M.D.

## 2020-02-10 NOTE — PATIENT INSTRUCTIONS
3.   Tresiba 16 units at night before bed    4.   Novolog 4 units before each meal that you eat (not on)  Sliding scale for what your BG number is  Start 1:40 above 100  100-140 1 units  141-180 2 units  181-220 3 units  221-260 4 units  261-300 5 units  301-340 6 units  341-380 7 units  381-420 8 units  460-500 9 units  501-540 10 units  Ect…  1.   Trulicity 1.5 once a week or Ozempic (not using)  2.   Jardiance 25mg one a day (stopped on own)  He has been on Prednisone daily (now off)

## 2020-02-13 ENCOUNTER — HOSPITAL ENCOUNTER (OUTPATIENT)
Dept: LAB | Facility: MEDICAL CENTER | Age: 70
End: 2020-02-13
Attending: INTERNAL MEDICINE
Payer: MEDICARE

## 2020-02-13 LAB
ALBUMIN SERPL BCP-MCNC: 4.2 G/DL (ref 3.2–4.9)
ALBUMIN/GLOB SERPL: 1.4 G/DL
ALP SERPL-CCNC: 52 U/L (ref 30–99)
ALT SERPL-CCNC: 18 U/L (ref 2–50)
ANION GAP SERPL CALC-SCNC: 8 MMOL/L (ref 0–11.9)
AST SERPL-CCNC: 22 U/L (ref 12–45)
BASOPHILS # BLD AUTO: 1.5 % (ref 0–1.8)
BASOPHILS # BLD: 0.07 K/UL (ref 0–0.12)
BILIRUB SERPL-MCNC: 0.5 MG/DL (ref 0.1–1.5)
BUN SERPL-MCNC: 13 MG/DL (ref 8–22)
CALCIUM SERPL-MCNC: 9.5 MG/DL (ref 8.5–10.5)
CHLORIDE SERPL-SCNC: 104 MMOL/L (ref 96–112)
CO2 SERPL-SCNC: 27 MMOL/L (ref 20–33)
CREAT SERPL-MCNC: 1.01 MG/DL (ref 0.5–1.4)
CRP SERPL HS-MCNC: 0.09 MG/DL (ref 0–0.75)
EOSINOPHIL # BLD AUTO: 0.11 K/UL (ref 0–0.51)
EOSINOPHIL NFR BLD: 2.3 % (ref 0–6.9)
ERYTHROCYTE [DISTWIDTH] IN BLOOD BY AUTOMATED COUNT: 58.5 FL (ref 35.9–50)
ERYTHROCYTE [SEDIMENTATION RATE] IN BLOOD BY WESTERGREN METHOD: 23 MM/HOUR (ref 0–20)
GLOBULIN SER CALC-MCNC: 2.9 G/DL (ref 1.9–3.5)
GLUCOSE SERPL-MCNC: 135 MG/DL (ref 65–99)
HCT VFR BLD AUTO: 43 % (ref 42–52)
HGB BLD-MCNC: 12.9 G/DL (ref 14–18)
IMM GRANULOCYTES # BLD AUTO: 0.03 K/UL (ref 0–0.11)
IMM GRANULOCYTES NFR BLD AUTO: 0.6 % (ref 0–0.9)
LYMPHOCYTES # BLD AUTO: 0.34 K/UL (ref 1–4.8)
LYMPHOCYTES NFR BLD: 7.1 % (ref 22–41)
MCH RBC QN AUTO: 26.1 PG (ref 27–33)
MCHC RBC AUTO-ENTMCNC: 30 G/DL (ref 33.7–35.3)
MCV RBC AUTO: 87 FL (ref 81.4–97.8)
MONOCYTES # BLD AUTO: 0.43 K/UL (ref 0–0.85)
MONOCYTES NFR BLD AUTO: 9 % (ref 0–13.4)
NEUTROPHILS # BLD AUTO: 3.79 K/UL (ref 1.82–7.42)
NEUTROPHILS NFR BLD: 79.5 % (ref 44–72)
NRBC # BLD AUTO: 0 K/UL
NRBC BLD-RTO: 0 /100 WBC
PLATELET # BLD AUTO: 212 K/UL (ref 164–446)
PMV BLD AUTO: 11.2 FL (ref 9–12.9)
POTASSIUM SERPL-SCNC: 4.1 MMOL/L (ref 3.6–5.5)
PROT SERPL-MCNC: 7.1 G/DL (ref 6–8.2)
RBC # BLD AUTO: 4.94 M/UL (ref 4.7–6.1)
SODIUM SERPL-SCNC: 139 MMOL/L (ref 135–145)
WBC # BLD AUTO: 4.8 K/UL (ref 4.8–10.8)

## 2020-02-13 PROCEDURE — 36415 COLL VENOUS BLD VENIPUNCTURE: CPT

## 2020-02-13 PROCEDURE — 80053 COMPREHEN METABOLIC PANEL: CPT

## 2020-02-13 PROCEDURE — 85025 COMPLETE CBC W/AUTO DIFF WBC: CPT

## 2020-02-13 PROCEDURE — 85652 RBC SED RATE AUTOMATED: CPT

## 2020-02-13 PROCEDURE — 86140 C-REACTIVE PROTEIN: CPT

## 2020-02-20 ENCOUNTER — PATIENT MESSAGE (OUTPATIENT)
Dept: MEDICAL GROUP | Facility: MEDICAL CENTER | Age: 70
End: 2020-02-20

## 2020-02-20 NOTE — TELEPHONE ENCOUNTER
From: Francesco Schulte  To: ALVINO Harman  Sent: 2/20/2020 8:50 AM PST  Subject: Non-Urgent Medical Question    My wife Alysa Schulte spent yesterday and the other day in [facility] in emergency ... concerned with the results she as ask and I recommended she obtained  as GP and you Nereyda as nurse practitioner because you guys have done such a great job with all my issues.   Would you consider a referral to Dr. Thomson and yourself for Alysa she is in your system.  Thank you

## 2020-02-27 ENCOUNTER — ANTICOAGULATION MONITORING (OUTPATIENT)
Dept: VASCULAR LAB | Facility: MEDICAL CENTER | Age: 70
End: 2020-02-27

## 2020-02-27 DIAGNOSIS — D68.9 COAGULOPATHY (HCC): ICD-10-CM

## 2020-02-27 DIAGNOSIS — Z79.01 LONG TERM (CURRENT) USE OF ANTICOAGULANTS: ICD-10-CM

## 2020-02-28 NOTE — PROGRESS NOTES
Discharged from [facility].      Pt managed by pcp  Chelle Scott, Clinical Pharmacist, CDE, CACP

## 2020-03-02 ENCOUNTER — PATIENT MESSAGE (OUTPATIENT)
Dept: MEDICAL GROUP | Facility: MEDICAL CENTER | Age: 70
End: 2020-03-02

## 2020-03-02 DIAGNOSIS — N40.0 BENIGN PROSTATIC HYPERPLASIA WITHOUT LOWER URINARY TRACT SYMPTOMS: ICD-10-CM

## 2020-03-02 RX ORDER — FINASTERIDE 5 MG/1
5 TABLET, FILM COATED ORAL DAILY
Qty: 90 TAB | Refills: 3 | Status: SHIPPED | OUTPATIENT
Start: 2020-03-02 | End: 2020-07-01

## 2020-04-16 DIAGNOSIS — I95.1 ORTHOSTATIC HYPOTENSION: ICD-10-CM

## 2020-04-16 RX ORDER — MIDODRINE HYDROCHLORIDE 5 MG/1
TABLET ORAL
Qty: 540 TAB | Refills: 0 | Status: SHIPPED | OUTPATIENT
Start: 2020-04-16 | End: 2020-07-01

## 2020-06-04 ENCOUNTER — HOSPITAL ENCOUNTER (OUTPATIENT)
Dept: LAB | Facility: MEDICAL CENTER | Age: 70
End: 2020-06-04
Attending: INTERNAL MEDICINE
Payer: MEDICARE

## 2020-06-04 LAB
ALBUMIN SERPL BCP-MCNC: 4.4 G/DL (ref 3.2–4.9)
ALBUMIN/GLOB SERPL: 1.4 G/DL
ALP SERPL-CCNC: 67 U/L (ref 30–99)
ALT SERPL-CCNC: 19 U/L (ref 2–50)
ANION GAP SERPL CALC-SCNC: 12 MMOL/L (ref 7–16)
AST SERPL-CCNC: 18 U/L (ref 12–45)
BASOPHILS # BLD AUTO: 0.8 % (ref 0–1.8)
BASOPHILS # BLD: 0.07 K/UL (ref 0–0.12)
BILIRUB SERPL-MCNC: 0.8 MG/DL (ref 0.1–1.5)
BUN SERPL-MCNC: 18 MG/DL (ref 8–22)
C3 SERPL-MCNC: 47 MG/DL (ref 87–200)
C4 SERPL-MCNC: 2.4 MG/DL (ref 19–52)
CALCIUM SERPL-MCNC: 9.8 MG/DL (ref 8.4–10.2)
CHLORIDE SERPL-SCNC: 99 MMOL/L (ref 96–112)
CO2 SERPL-SCNC: 26 MMOL/L (ref 20–33)
CREAT SERPL-MCNC: 1.1 MG/DL (ref 0.5–1.4)
CREAT UR-MCNC: 97.44 MG/DL
CRP SERPL HS-MCNC: 0.15 MG/DL (ref 0–0.75)
EOSINOPHIL # BLD AUTO: 0.01 K/UL (ref 0–0.51)
EOSINOPHIL NFR BLD: 0.1 % (ref 0–6.9)
ERYTHROCYTE [DISTWIDTH] IN BLOOD BY AUTOMATED COUNT: 57.4 FL (ref 35.9–50)
ERYTHROCYTE [SEDIMENTATION RATE] IN BLOOD BY WESTERGREN METHOD: 1 MM/HOUR (ref 0–20)
FASTING STATUS PATIENT QL REPORTED: NORMAL
GLOBULIN SER CALC-MCNC: 3.1 G/DL (ref 1.9–3.5)
GLUCOSE SERPL-MCNC: 337 MG/DL (ref 65–99)
HCT VFR BLD AUTO: 47.5 % (ref 42–52)
HGB BLD-MCNC: 14.5 G/DL (ref 14–18)
IMM GRANULOCYTES # BLD AUTO: 0.13 K/UL (ref 0–0.11)
IMM GRANULOCYTES NFR BLD AUTO: 1.4 % (ref 0–0.9)
LYMPHOCYTES # BLD AUTO: 0.46 K/UL (ref 1–4.8)
LYMPHOCYTES NFR BLD: 5 % (ref 22–41)
MCH RBC QN AUTO: 27.7 PG (ref 27–33)
MCHC RBC AUTO-ENTMCNC: 30.5 G/DL (ref 33.7–35.3)
MCV RBC AUTO: 90.6 FL (ref 81.4–97.8)
MONOCYTES # BLD AUTO: 0.5 K/UL (ref 0–0.85)
MONOCYTES NFR BLD AUTO: 5.5 % (ref 0–13.4)
NEUTROPHILS # BLD AUTO: 7.99 K/UL (ref 1.82–7.42)
NEUTROPHILS NFR BLD: 87.2 % (ref 44–72)
NRBC # BLD AUTO: 0 K/UL
NRBC BLD-RTO: 0 /100 WBC
PLATELET # BLD AUTO: 304 K/UL (ref 164–446)
PMV BLD AUTO: 12.4 FL (ref 9–12.9)
POTASSIUM SERPL-SCNC: 4.6 MMOL/L (ref 3.6–5.5)
PROT SERPL-MCNC: 7.5 G/DL (ref 6–8.2)
PROT UR-MCNC: 27 MG/DL (ref 0–15)
PROT/CREAT UR: 277 MG/G (ref 15–68)
RBC # BLD AUTO: 5.24 M/UL (ref 4.7–6.1)
SODIUM SERPL-SCNC: 137 MMOL/L (ref 135–145)
WBC # BLD AUTO: 9.2 K/UL (ref 4.8–10.8)

## 2020-06-04 PROCEDURE — 80053 COMPREHEN METABOLIC PANEL: CPT

## 2020-06-04 PROCEDURE — 36415 COLL VENOUS BLD VENIPUNCTURE: CPT

## 2020-06-04 PROCEDURE — 82570 ASSAY OF URINE CREATININE: CPT

## 2020-06-04 PROCEDURE — 86160 COMPLEMENT ANTIGEN: CPT

## 2020-06-04 PROCEDURE — 85652 RBC SED RATE AUTOMATED: CPT

## 2020-06-04 PROCEDURE — 86140 C-REACTIVE PROTEIN: CPT

## 2020-06-04 PROCEDURE — 84156 ASSAY OF PROTEIN URINE: CPT

## 2020-06-04 PROCEDURE — 85025 COMPLETE CBC W/AUTO DIFF WBC: CPT

## 2020-06-05 ENCOUNTER — OFFICE VISIT (OUTPATIENT)
Dept: DERMATOLOGY | Facility: IMAGING CENTER | Age: 70
End: 2020-06-05
Payer: MEDICARE

## 2020-06-05 DIAGNOSIS — R21 RASH: ICD-10-CM

## 2020-06-05 DIAGNOSIS — R73.9 HYPERGLYCEMIA: ICD-10-CM

## 2020-06-05 DIAGNOSIS — M32.9 LUPUS (HCC): ICD-10-CM

## 2020-06-05 PROCEDURE — 99213 OFFICE O/P EST LOW 20 MIN: CPT | Performed by: DERMATOLOGY

## 2020-06-05 NOTE — PROGRESS NOTES
"CC:  Follow up rash /lupus     Subjective: Previously seen patient here for skin rash on leg and buttocks.      Reports complex history a little difficult to follow:  May have had a spider bite on left thigh/leg weeks ago, started small and then became bigger.  Aggravated by betamethasone topical but improved some with ciclopirox cream.  Redness, soreness now improved.  Several weeks later had noted another area on left leg with sore red bumps, picked/squeezed at site.  Had some residual prednisone at home which he took - helped redness.  Now site almost completely resolved.  In last 1-3 weeks, has developed red spots, mildly sore on buttocks.  He squeezes these regularly to express contents, which helps.  Has been hard to sit down, can be very tender.  Desires refill of ciclopirox to treat.     Reports past hx of agent orange exposure, hx of lupus found about 1 year ago.  Had been managed on/off with different medications.      Denies known liver problems.      ROS: no fevers/chills. +/- itch.  No cough.  Weight loss of 60 pounds over 6 months, attributing to lupus, per patient.  DermPMH: no skin cancer/melanoma  No problem-specific Assessment & Plan notes found for this encounter.    Relevant PMH: hx [facility].  Had been on/off prednisone in past.  Hx of MTX use.  Hx of 'blood cancer' managed by [facility]e-Onc, polycythemia/thrombocytopenia in records  - hydroxyurea.  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    PE: Gen:WDWN male in NAD.  Skin: scalp - hair thinning on scalp.  Face - no notable rashes, but mask/bandana covering lower face.  Hands/arms without rashes noted.  Left thigh - isolated erythematous papule on medial thigh.  Patch of erythema with few nodules and overlying scaling on anterior thigh.  Buttocks - medial " buttocks erythema studded with 1-2 pustules and few papulonodules.       Labs: June 2020  WBC  4.8 - 10.8 K/uL  9.2   4.8   7.2   4.4Low       RBC  4.70 - 6.10 M/uL  5.24   4.94   4.97   5.63     Hemoglobin  14.0 - 18.0 g/dL  14.5   12.9Low     13.1Low     12.3Low       Hematocrit  42.0 - 52.0 %  47.5   43.0   44.0   42.9     MCV  81.4 - 97.8 fL  90.6   87.0   88.5   76.2Low       MCH  27.0 - 33.0 pg  27.7   26.1Low     26.4Low     21.8Low       MCHC  33.7 - 35.3 g/dL  30.5Low     30.0Low     29.8Low     28.7Low       RDW  35.9 - 50.0 fL  57.4High     58.5High     67.8High     56.3High       Platelet Count  164 - 446 K/uL  304   212   412   303     MPV  9.0 - 12.9 fL  12.4   11.2   11.6      Neutrophils-Polys  44.00 - 72.00 %  87.20High     79.50High     94.70High     70.30     Lymphocytes  22.00 - 41.00 %  5.00Low     7.10Low     4.40Low     14.60Low       Monocytes  0.00 - 13.40 %  5.50   9.00   0.00   11.70     Eosinophils  0.00 - 6.90 %  0.10   2.30   0.00   1.60     Basophils  0.00 - 1.80 %  0.80   1.50   0.90   0.90     Immature Granulocytes  0.00 - 0.90 %  1.40High     0.60    0.90     Nucleated RBC  /100 WBC  0.00   0.00   0.00   0.00     Neutrophils (Absolute)  1.82 - 7.42 K/uL  7.99High     3.79 CM   6.82 CM   3.07 CM     Comment: Includes immature neutrophils, if present.    Lymphs (Absolute)  1.00 - 4.80 K/uL  0.46Low     0.34Low     0.32Low     0.64Low       Monos (Absolute)  0.00 - 0.85 K/uL  0.50   0.43   0.00   0.51     Eos (Absolute)  0.00 - 0.51 K/uL  0.01   0.11   0.00   0.07     Baso (Absolute)  0.00 - 0.12 K/uL  0.07   0.07   0.06   0.04     Immature Granulocytes (abs)  0.00 - 0.11 K/uL  0.13High     0.03    0.04     NRBC (Absolute)  K/uL  0.00   0.00   0.00   0.00      Sodium  135 - 145 mmol/L  137   139   137   140     Potassium  3.6 - 5.5 mmol/L  4.6   4.1   3.7   4.2     Chloride  96 - 112 mmol/L  99   104   102   107     Co2  20 - 33 mmol/L  26   27   27   20     Anion Gap  7.0 - 16.0  12.0  "  8.0 R   8.0 R   13.0High   R     Glucose  65 - 99 mg/dL  337High     135High     216High     105High       Bun  8 - 22 mg/dL  18   13   20   18     Creatinine  0.50 - 1.40 mg/dL  1.10   1.01   0.92   0.97     Calcium  8.4 - 10.2 mg/dL  9.8   9.5 R   9.2 R   9.4     AST(SGOT)  12 - 45 U/L  18   22   14   39 CM     ALT(SGPT)  2 - 50 U/L  19   18   15   17     Alkaline Phosphatase  30 - 99 U/L  67   52   48   47     Total Bilirubin  0.1 - 1.5 mg/dL  0.8   0.5   0.8   0.6     Albumin  3.2 - 4.9 g/dL  4.4   4.2   4.0   3.9     Total Protein  6.0 - 8.2 g/dL  7.5   7.1   6.7   7.6     Globulin  1.9 - 3.5 g/dL  3.1   2.9   2.7   3.7High       A-G Ratio  g/dL            C3 Complement  87.0 - 200.0 mg/dL  47.0Low     77.0Low     62.0Low       Complement C4  19.0 - 52.0 mg/dL  2.4Low     <8.0Low     <8.0Low      Total Protein, Urine  0.0 - 15.0 mg/dL  27.0High     13.6   11.0     Creatinine, Random Urine  mg/dL  97.44   108.80 CM   107.40 CM     Comment: Reference ranges have not been established for this specimen type.   Result interpretation should include consideration of patient's   medical condition and clinical presentation.    Protein Creatinine Ratio  15 - 68 mg/g  277High     125High     102High      Sed Rate Westergren  0 - 20 mm/hour  1      Stat C-Reactive Protein  0.00 - 0.75 mg/dL  0.15        A/P: \"rash\", consider possible folliculitis, either bacterial/fungus component.  Possible also tinea, partially treated, with patient report of improvement with ciclopirox cream.  Other/ alternate diagnoses to consider - dermal hypersensitivity, trauma/friction folliculitis, Majocchi's or less likely atypical lymphoma eruption:  -recommended biopsy, but secondary to site(s) and hx of remote living/\"need to be out of [place] in 3 hours\", risks of infection, declined today  -refilled ciclopirox cream for BID-TID use for 3-4 weeks at patient request (had shown improvement by report)  -discouraged use of betamethasone/prednisone " which might aggravate fungal etiology and could be contributing to elevated glucose.  -will RTC June 22 when he returns to [facility] for Heme visit and consider biopsy on that day, if rash not substantially improved  -would consider punch biopsy + tissue culture for bacterial/fungal/AFB?  -consider oral Lamisil 250mg PO Qday X 4 weeks - would benefit from drug med list/interactions review prior to rx    Hyperglycemia:   -rec f/u PCP  -patient reports insulin to treat at home    Lupus:   -f/u Rheum    I have reviewed medications relevant to my specialty.  Would benefit from review of problem / medication list(s) as medical services received at multiple sites and may benefit from medication(s) with drug-drug cross-reactivity potential.

## 2020-06-22 ENCOUNTER — OFFICE VISIT (OUTPATIENT)
Dept: DERMATOLOGY | Facility: IMAGING CENTER | Age: 70
End: 2020-06-22
Payer: MEDICARE

## 2020-06-22 ENCOUNTER — HOSPITAL ENCOUNTER (OUTPATIENT)
Facility: MEDICAL CENTER | Age: 70
End: 2020-06-22
Attending: NURSE PRACTITIONER
Payer: MEDICARE

## 2020-06-22 DIAGNOSIS — L72.9 INFECTED CYST OF SKIN: ICD-10-CM

## 2020-06-22 DIAGNOSIS — L08.9 INFECTED CYST OF SKIN: ICD-10-CM

## 2020-06-22 DIAGNOSIS — R21 RASH: ICD-10-CM

## 2020-06-22 PROCEDURE — 87205 SMEAR GRAM STAIN: CPT

## 2020-06-22 PROCEDURE — 10060 I&D ABSCESS SIMPLE/SINGLE: CPT | Performed by: NURSE PRACTITIONER

## 2020-06-22 PROCEDURE — 11102 TANGNTL BX SKIN SINGLE LES: CPT | Performed by: NURSE PRACTITIONER

## 2020-06-22 PROCEDURE — 87015 SPECIMEN INFECT AGNT CONCNTJ: CPT

## 2020-06-22 PROCEDURE — 87070 CULTURE OTHR SPECIMN AEROBIC: CPT | Mod: 91

## 2020-06-22 PROCEDURE — 11103 TANGNTL BX SKIN EA SEP/ADDL: CPT | Performed by: NURSE PRACTITIONER

## 2020-06-22 RX ORDER — DOXYCYCLINE HYCLATE 100 MG
100 TABLET ORAL 2 TIMES DAILY
Qty: 20 TAB | Refills: 0 | Status: SHIPPED | OUTPATIENT
Start: 2020-06-22 | End: 2020-06-30

## 2020-06-22 ASSESSMENT — ENCOUNTER SYMPTOMS
FEVER: 0
CHILLS: 0

## 2020-06-22 NOTE — PROGRESS NOTES
"Dermatology Return Patient Visit    Chief Complaint   Patient presents with   • Follow-Up       Subjective:     HPI:   Francesco Schulte is a 69 y.o. male presenting for    Follow-up \"rash\"  Not improving on refill of ciclopirox  Not using betamethasone  Here for bx     Was supposed to see Dr. Horan (rheum) but states that appointment was cancelled.     New area of concern since last visit:  HPI/location: mid back-has had \"a large black head for a long time and now it's infected.\" Wants it lanced and drained.   Time present: noticed started to get painful last week.   Painful lesion: Yes  Itching lesion: No  Enlarging lesion: Yes  Anything make it better or worse? Pressing on it.       Does not have current med list today.       History collected on 6/5/20 at [facility] with Dr. Lazcano:    Reports complex history a little difficult to follow:  May have had a spider bite on left thigh/leg weeks ago, started small and then became bigger.  Aggravated by betamethasone topical but improved some with ciclopirox cream.  Redness, soreness now improved.  Several weeks later had noted another area on left leg with sore red bumps, picked/squeezed at site.  Had some residual prednisone at home which he took - helped redness.  Now site almost completely resolved.  In last 1-3 weeks, has developed red spots, mildly sore on buttocks.  He squeezes these regularly to express contents, which helps.  Has been hard to sit down, can be very tender.  Desires refill of ciclopirox to treat.      Reports past hx of agent orange exposure, hx of lupus found about 1 year ago.  Had been managed on/off with different medications.           Past Medical History:   Diagnosis Date   • Anesthesia     resistant to pain meds   • Cancer (HCC)     polycythemia vera   • Diabetes (HCC)     insulin and oral meds   • Family history of punctured lung 2002    left lung   • Heart attack (HCC) 03/2017   • Hemorrhagic disorder (HCC)     on blood thinners   • High " cholesterol    • Ischemic cardiomyopathy    • Kidney stones     hx of kidney stones   • Orthostatic hypotension 3/29/2018   • Pain     headache pain   • Polycythemia vera(238.4)    • Stroke (HCC) 2016    r/t afib; eye issues resolved afterward   • Urinary bladder disorder     enlarged prostate, weak stream, difficulty urinating   • Vertigo        Current Outpatient Medications on File Prior to Visit   Medication Sig Dispense Refill   • ciclopirox (LOPROX) 0.77 % cream AAA legs/buttocks BID-TID for rash 60 g 2   • midodrine (PROAMATINE) 5 MG Tab TAKE 3 TABLETS BY MOUTH 2 TIMES A  Tab 0   • finasteride (PROSCAR) 5 MG Tab Take 1 Tab by mouth every day. 90 Tab 3   • Ciclopirox 0.77 % Gel 4 g by Apply externally route 2 times a day as needed. 1 Tube 3   • JARDIANCE 25 MG Tab Take 1 tablet by mouth every day. (Patient not taking: Reported on 6/5/2020) 30 Tab 11   • Dulaglutide (TRULICITY) 1.5 MG/0.5ML Solution Pen-injector Inject 0.5 mL as instructed every 7 days. 4 PEN 6   • tamsulosin (FLOMAX) 0.4 MG capsule Take 2 Caps by mouth ONE-HALF HOUR AFTER BREAKFAST. 180 Cap 3   • ONETOUCH VERIO strip ONETOUCH VERIO TEST STRIPS FOR GLUCOSE MONITORING USE TO TEST 3 TIMES A DAYE11.65 100 Strip 11   • tamsulosin (FLOMAX) 0.4 MG capsule TAKE 1 CAP BY MOUTH EVERY EVENING. 90 Cap 3   • apixaban (ELIQUIS) 5mg Tab Take 1 Tab by mouth 2 Times a Day. 180 Tab 3   • predniSONE (DELTASONE) 10 MG Tab 1 tablet daily 45 Tab 0   • insulin aspart (NOVOLOG) 100 unit/ml injection PEN Inject 5 Units as instructed 3 times a day before meals. (Patient not taking: Reported on 6/5/2020) 15 PEN 3   • dicyclomine (BENTYL) 10 MG Cap Take 1 Cap by mouth 4 Times a Day,Before Meals and at Bedtime. for cramps (Patient not taking: Reported on 6/5/2020) 20 Cap 0   • Insulin Degludec (TRESIBA FLEXTOUCH) 200 UNIT/ML Solution Pen-injector Inject 50 Units as instructed every bedtime. 3 PEN 2   • omeprazole (PRILOSEC OTC) 20 MG tablet Take 1 Tab by mouth 2  "Times a Day. 60 Tab 3   • aspirin EC (ECOTRIN) 81 MG Tablet Delayed Response Take 1 Tab by mouth every day. 30 Tab    • therapeutic multivitamin-minerals (THERAGRAN-M) Tab Take 1 Tab by mouth every day.     • hydroxyurea (HYDREA) 500 MG Cap Take 1,000 mg by mouth every morning.     • colesevelam (WELCHOL) 625 MG Tab Take 625 mg by mouth 2 times a day, with meals.       No current facility-administered medications on file prior to visit.        Allergies   Allergen Reactions   • Beta Adrenergic Blockers Unspecified     dizziness   • Metformin Unspecified and Palpitations     Cardiac effects  \"Similar to a heart attack, I was hospitalized\"   • Simvastatin      Other reaction(s): Other (Specify with Comments)  Myalgias  Myalgias   • Statins [Hmg-Coa-R Inhibitors]      Muscle ache, joint pain       History reviewed. No pertinent family history.    Social History     Socioeconomic History   • Marital status:      Spouse name: Not on file   • Number of children: Not on file   • Years of education: Not on file   • Highest education level: Not on file   Occupational History   • Not on file   Social Needs   • Financial resource strain: Not on file   • Food insecurity     Worry: Not on file     Inability: Not on file   • Transportation needs     Medical: Not on file     Non-medical: Not on file   Tobacco Use   • Smoking status: Never Smoker   • Smokeless tobacco: Never Used   Substance and Sexual Activity   • Alcohol use: No   • Drug use: No   • Sexual activity: Not on file   Lifestyle   • Physical activity     Days per week: Not on file     Minutes per session: Not on file   • Stress: Not on file   Relationships   • Social connections     Talks on phone: Not on file     Gets together: Not on file     Attends Alevism service: Not on file     Active member of club or organization: Not on file     Attends meetings of clubs or organizations: Not on file     Relationship status: Not on file   • Intimate partner violence "     Fear of current or ex partner: Not on file     Emotionally abused: Not on file     Physically abused: Not on file     Forced sexual activity: Not on file   Other Topics Concern   • Not on file   Social History Narrative   • Not on file       Review of Systems   Constitutional: Negative for chills and fever.   Skin: Positive for rash. Negative for itching.        Objective:     A focused cutaneous exam was completed including: face above mask, back, left and right lower extremities and buttocks with the following pertinent findings listed below. Remaining above-listed examined areas within normal limits / negative for rashes or lesions.      There were no vitals taken for this visit.    Physical Exam   Constitutional: He is oriented to person, place, and time and well-developed, well-nourished, and in no distress. No distress.   HENT:   Head: Normocephalic.   Pulmonary/Chest: Effort normal.   Neurological: He is alert and oriented to person, place, and time.   Skin: Skin is warm and dry. Rash noted. There is erythema.   Left thigh -    Patch of erythema with slight scaling on anterior thigh.      Buttocks - medial buttocks erythema studded with 1-2 pustules and few papulonodules.      Psychiatric: Mood normal.       DATA: labs reviewed at last visit with Dr. Lazcano. No new labs or pertinent data    Assessment and Plan:     1. Rash  Procedure Note   Procedure: Biopsy by punch technique x2 one for H&E and other for tissue culture  Location: left anterior thigh  Preoperative diagnosis: fungal, infectious, hypersensitivity dermal rxn, folliculitis, trauma, Majocchi's, lupus, lymphoma  Risks, benefits and alternatives of procedure discussed and written informed consent obtained. Time out completed. Area of biopsy prepped with alcohol. Anesthesia with 1% lidocaine with epinephrine administered with a 30 gauge needle. 4  mm punch biopsy of site performed. Hemostasis achieved with pressure and 4.0 prolene sutures.  Vaseline applied to wound with bandage. Patient tolerated procedure well, and there were no complications.  The pathology specimen was sent to the lab via the staff.  Wound care was discussed with the patient.     - CULTURE TISSUE W/O GRM STAIN; Future      2. Infected cyst of skin  After appropriate consent, written and verbal    Procedure: I&D of cyst of back    Performed in normal sterile fashion with size 10 blade, 2% lidocaine with Epi  approx 2 ml of puss material oozed out  Patient tollerated procedure well.    Aftercare instructions provided and reviewed. Pt verbalized understanding.    - start doxycycline 100mg BID. Instructed to take with food & water. Side effects including, but not limited to, GI upset, photosensitivity (and need for photoprotection) discussed.     If continues to become infected, consider removal    - CULTURE WOUND W/ GRAM STAIN; Future        Followup: Pending biopsy results. 10 days for suture removal    CONNER Wagner.

## 2020-06-23 LAB
GRAM STN SPEC: NORMAL
SIGNIFICANT IND 70042: NORMAL
SITE SITE: NORMAL
SOURCE SOURCE: NORMAL

## 2020-06-26 LAB
BACTERIA WND AEROBE CULT: NORMAL
GRAM STN SPEC: NORMAL
SIGNIFICANT IND 70042: NORMAL
SITE SITE: NORMAL
SOURCE SOURCE: NORMAL

## 2020-06-28 LAB
BACTERIA TISS CULT: NORMAL
SIGNIFICANT IND 70042: NORMAL
SITE SITE: NORMAL
SOURCE SOURCE: NORMAL

## 2020-06-30 ENCOUNTER — TELEPHONE (OUTPATIENT)
Dept: DERMATOLOGY | Facility: IMAGING CENTER | Age: 70
End: 2020-06-30

## 2020-06-30 NOTE — TELEPHONE ENCOUNTER
Notified pt of path results. Will discuss with Dr. Lazcano.     Pt thinks he is allergic to doxycycline. Swelling lips and difficulties swallowing. Pt has stopped doxy, is improving. ER precautions advised for any worsening s/s. Pt verbalizes understanding.

## 2020-07-01 ENCOUNTER — HOSPITAL ENCOUNTER (INPATIENT)
Facility: MEDICAL CENTER | Age: 70
LOS: 1 days | DRG: 547 | End: 2020-07-03
Attending: EMERGENCY MEDICINE | Admitting: INTERNAL MEDICINE
Payer: MEDICARE

## 2020-07-01 ENCOUNTER — APPOINTMENT (OUTPATIENT)
Dept: RADIOLOGY | Facility: MEDICAL CENTER | Age: 70
DRG: 547 | End: 2020-07-01
Attending: EMERGENCY MEDICINE
Payer: MEDICARE

## 2020-07-01 ENCOUNTER — APPOINTMENT (OUTPATIENT)
Dept: CARDIOLOGY | Facility: MEDICAL CENTER | Age: 70
DRG: 547 | End: 2020-07-01
Attending: INTERNAL MEDICINE
Payer: MEDICARE

## 2020-07-01 DIAGNOSIS — R07.9 CHEST PAIN, UNSPECIFIED TYPE: ICD-10-CM

## 2020-07-01 DIAGNOSIS — R06.02 SHORTNESS OF BREATH: ICD-10-CM

## 2020-07-01 PROBLEM — R79.89 ELEVATED BRAIN NATRIURETIC PEPTIDE (BNP) LEVEL: Status: ACTIVE | Noted: 2020-07-01

## 2020-07-01 PROBLEM — R22.0 LIP SWELLING: Status: ACTIVE | Noted: 2020-07-01

## 2020-07-01 PROBLEM — Z78.9 MEDICATION INTOLERANCE: Status: ACTIVE | Noted: 2020-07-01

## 2020-07-01 PROBLEM — R52 GENERALIZED PAIN: Status: ACTIVE | Noted: 2020-07-01

## 2020-07-01 LAB
ALBUMIN SERPL BCP-MCNC: 3.5 G/DL (ref 3.2–4.9)
ALBUMIN/GLOB SERPL: 1.1 G/DL
ALP SERPL-CCNC: 64 U/L (ref 30–99)
ALT SERPL-CCNC: 18 U/L (ref 2–50)
ANION GAP SERPL CALC-SCNC: 10 MMOL/L (ref 7–16)
AST SERPL-CCNC: 26 U/L (ref 12–45)
BASOPHILS # BLD AUTO: 6 % (ref 0–1.8)
BASOPHILS # BLD: 0.29 K/UL (ref 0–0.12)
BILIRUB SERPL-MCNC: 0.5 MG/DL (ref 0.1–1.5)
BUN SERPL-MCNC: 16 MG/DL (ref 8–22)
CALCIUM SERPL-MCNC: 9.2 MG/DL (ref 8.4–10.2)
CHLORIDE SERPL-SCNC: 102 MMOL/L (ref 96–112)
CO2 SERPL-SCNC: 26 MMOL/L (ref 20–33)
CREAT SERPL-MCNC: 0.98 MG/DL (ref 0.5–1.4)
CRP SERPL HS-MCNC: 0.58 MG/DL (ref 0–0.75)
EKG IMPRESSION: NORMAL
EKG IMPRESSION: NORMAL
EOSINOPHIL # BLD AUTO: 0.05 K/UL (ref 0–0.51)
EOSINOPHIL NFR BLD: 1 % (ref 0–6.9)
ERYTHROCYTE [DISTWIDTH] IN BLOOD BY AUTOMATED COUNT: 54.1 FL (ref 35.9–50)
ERYTHROCYTE [SEDIMENTATION RATE] IN BLOOD BY WESTERGREN METHOD: 15 MM/HOUR (ref 0–20)
GLOBULIN SER CALC-MCNC: 3.3 G/DL (ref 1.9–3.5)
GLUCOSE SERPL-MCNC: 141 MG/DL (ref 65–99)
HCT VFR BLD AUTO: 43.6 % (ref 42–52)
HGB BLD-MCNC: 13.3 G/DL (ref 14–18)
HYPOCHROMIA BLD QL SMEAR: ABNORMAL
LIPASE SERPL-CCNC: 45 U/L (ref 7–58)
LV EJECT FRACT  99904: 30
LV EJECT FRACT MOD 2C 99903: 47.3
LV EJECT FRACT MOD 4C 99902: 41.98
LV EJECT FRACT MOD BP 99901: 44.95
LYMPHOCYTES # BLD AUTO: 0.25 K/UL (ref 1–4.8)
LYMPHOCYTES NFR BLD: 5 % (ref 22–41)
MACROCYTES BLD QL SMEAR: ABNORMAL
MANUAL DIFF BLD: NORMAL
MCH RBC QN AUTO: 26.3 PG (ref 27–33)
MCHC RBC AUTO-ENTMCNC: 30.5 G/DL (ref 33.7–35.3)
MCV RBC AUTO: 86.3 FL (ref 81.4–97.8)
MONOCYTES # BLD AUTO: 0.44 K/UL (ref 0–0.85)
MONOCYTES NFR BLD AUTO: 9 % (ref 0–13.4)
NEUTROPHILS # BLD AUTO: 3.87 K/UL (ref 1.82–7.42)
NEUTROPHILS NFR BLD: 79 % (ref 44–72)
NEUTS HYPERSEG BLD QL SMEAR: NORMAL
NRBC # BLD AUTO: 0 K/UL
NRBC BLD-RTO: 0 /100 WBC
NT-PROBNP SERPL IA-MCNC: 1576 PG/ML (ref 0–125)
OVALOCYTES BLD QL SMEAR: NORMAL
PLATELET # BLD AUTO: 190 K/UL (ref 164–446)
PLATELET BLD QL SMEAR: NORMAL
PMV BLD AUTO: 13 FL (ref 9–12.9)
POIKILOCYTOSIS BLD QL SMEAR: NORMAL
POTASSIUM SERPL-SCNC: 3.7 MMOL/L (ref 3.6–5.5)
PROT SERPL-MCNC: 6.8 G/DL (ref 6–8.2)
RBC # BLD AUTO: 5.05 M/UL (ref 4.7–6.1)
RBC BLD AUTO: PRESENT
SODIUM SERPL-SCNC: 138 MMOL/L (ref 135–145)
TROPONIN T SERPL-MCNC: 11 NG/L (ref 6–19)
TROPONIN T SERPL-MCNC: 13 NG/L (ref 6–19)
TROPONIN T SERPL-MCNC: 18 NG/L (ref 6–19)
WBC # BLD AUTO: 4.9 K/UL (ref 4.8–10.8)

## 2020-07-01 PROCEDURE — 700102 HCHG RX REV CODE 250 W/ 637 OVERRIDE(OP): Performed by: INTERNAL MEDICINE

## 2020-07-01 PROCEDURE — 93005 ELECTROCARDIOGRAM TRACING: CPT | Performed by: EMERGENCY MEDICINE

## 2020-07-01 PROCEDURE — 96374 THER/PROPH/DIAG INJ IV PUSH: CPT

## 2020-07-01 PROCEDURE — 99220 PR INITIAL OBSERVATION CARE,LEVL III: CPT | Performed by: INTERNAL MEDICINE

## 2020-07-01 PROCEDURE — 85027 COMPLETE CBC AUTOMATED: CPT

## 2020-07-01 PROCEDURE — G0378 HOSPITAL OBSERVATION PER HR: HCPCS

## 2020-07-01 PROCEDURE — 71045 X-RAY EXAM CHEST 1 VIEW: CPT

## 2020-07-01 PROCEDURE — 700111 HCHG RX REV CODE 636 W/ 250 OVERRIDE (IP): Performed by: INTERNAL MEDICINE

## 2020-07-01 PROCEDURE — 93306 TTE W/DOPPLER COMPLETE: CPT

## 2020-07-01 PROCEDURE — 96376 TX/PRO/DX INJ SAME DRUG ADON: CPT

## 2020-07-01 PROCEDURE — 99285 EMERGENCY DEPT VISIT HI MDM: CPT

## 2020-07-01 PROCEDURE — 93306 TTE W/DOPPLER COMPLETE: CPT | Mod: 26 | Performed by: INTERNAL MEDICINE

## 2020-07-01 PROCEDURE — 96375 TX/PRO/DX INJ NEW DRUG ADDON: CPT

## 2020-07-01 PROCEDURE — 83880 ASSAY OF NATRIURETIC PEPTIDE: CPT

## 2020-07-01 PROCEDURE — A9270 NON-COVERED ITEM OR SERVICE: HCPCS | Performed by: INTERNAL MEDICINE

## 2020-07-01 PROCEDURE — 85652 RBC SED RATE AUTOMATED: CPT

## 2020-07-01 PROCEDURE — 700102 HCHG RX REV CODE 250 W/ 637 OVERRIDE(OP): Performed by: EMERGENCY MEDICINE

## 2020-07-01 PROCEDURE — 83690 ASSAY OF LIPASE: CPT

## 2020-07-01 PROCEDURE — 84484 ASSAY OF TROPONIN QUANT: CPT | Mod: 91

## 2020-07-01 PROCEDURE — 700111 HCHG RX REV CODE 636 W/ 250 OVERRIDE (IP): Performed by: EMERGENCY MEDICINE

## 2020-07-01 PROCEDURE — 36415 COLL VENOUS BLD VENIPUNCTURE: CPT

## 2020-07-01 PROCEDURE — 86140 C-REACTIVE PROTEIN: CPT

## 2020-07-01 PROCEDURE — A9270 NON-COVERED ITEM OR SERVICE: HCPCS | Performed by: EMERGENCY MEDICINE

## 2020-07-01 PROCEDURE — 85007 BL SMEAR W/DIFF WBC COUNT: CPT

## 2020-07-01 PROCEDURE — 80053 COMPREHEN METABOLIC PANEL: CPT

## 2020-07-01 RX ORDER — OXYCODONE HYDROCHLORIDE 10 MG/1
10 TABLET ORAL EVERY 4 HOURS PRN
Status: DISCONTINUED | OUTPATIENT
Start: 2020-07-01 | End: 2020-07-03 | Stop reason: HOSPADM

## 2020-07-01 RX ORDER — INSULIN DEGLUDEC 200 U/ML
8 INJECTION, SOLUTION SUBCUTANEOUS EVERY EVENING
Status: ON HOLD | COMMUNITY
End: 2020-10-03 | Stop reason: SDUPTHER

## 2020-07-01 RX ORDER — ONDANSETRON 4 MG/1
4 TABLET, ORALLY DISINTEGRATING ORAL EVERY 4 HOURS PRN
Status: DISCONTINUED | OUTPATIENT
Start: 2020-07-01 | End: 2020-07-03 | Stop reason: HOSPADM

## 2020-07-01 RX ORDER — BISACODYL 10 MG
10 SUPPOSITORY, RECTAL RECTAL
Status: DISCONTINUED | OUTPATIENT
Start: 2020-07-01 | End: 2020-07-03 | Stop reason: HOSPADM

## 2020-07-01 RX ORDER — MINERAL OIL/HYDROPHIL PETROLAT
OINTMENT (GRAM) TOPICAL 3 TIMES DAILY
Status: DISCONTINUED | OUTPATIENT
Start: 2020-07-01 | End: 2020-07-03 | Stop reason: HOSPADM

## 2020-07-01 RX ORDER — FOLIC ACID 1 MG/1
1 TABLET ORAL
Status: DISCONTINUED | OUTPATIENT
Start: 2020-07-01 | End: 2020-07-03 | Stop reason: HOSPADM

## 2020-07-01 RX ORDER — NAPROXEN 250 MG/1
500-750 TABLET ORAL 2 TIMES DAILY PRN
COMMUNITY
End: 2021-05-28

## 2020-07-01 RX ORDER — FOLIC ACID 1 MG/1
1 TABLET ORAL
COMMUNITY
Start: 2020-06-01 | End: 2020-07-25

## 2020-07-01 RX ORDER — DOXYCYCLINE HYCLATE 100 MG
100 TABLET ORAL 2 TIMES DAILY
Status: SHIPPED | COMMUNITY
Start: 2020-06-22 | End: 2020-07-01

## 2020-07-01 RX ORDER — FINASTERIDE 5 MG/1
5 TABLET, FILM COATED ORAL EVERY EVENING
Status: DISCONTINUED | OUTPATIENT
Start: 2020-07-01 | End: 2020-07-03 | Stop reason: HOSPADM

## 2020-07-01 RX ORDER — METHYLPREDNISOLONE SODIUM SUCCINATE 125 MG/2ML
125 INJECTION, POWDER, LYOPHILIZED, FOR SOLUTION INTRAMUSCULAR; INTRAVENOUS EVERY 6 HOURS
Status: DISCONTINUED | OUTPATIENT
Start: 2020-07-01 | End: 2020-07-03 | Stop reason: HOSPADM

## 2020-07-01 RX ORDER — ONDANSETRON 2 MG/ML
4 INJECTION INTRAMUSCULAR; INTRAVENOUS EVERY 4 HOURS PRN
Status: DISCONTINUED | OUTPATIENT
Start: 2020-07-01 | End: 2020-07-03 | Stop reason: HOSPADM

## 2020-07-01 RX ORDER — POLYETHYLENE GLYCOL 3350 17 G/17G
1 POWDER, FOR SOLUTION ORAL
Status: DISCONTINUED | OUTPATIENT
Start: 2020-07-01 | End: 2020-07-03 | Stop reason: HOSPADM

## 2020-07-01 RX ORDER — OMEPRAZOLE 20 MG/1
20 CAPSULE, DELAYED RELEASE ORAL DAILY
Status: DISCONTINUED | OUTPATIENT
Start: 2020-07-01 | End: 2020-07-03 | Stop reason: HOSPADM

## 2020-07-01 RX ORDER — ACETAMINOPHEN 325 MG/1
650 TABLET ORAL EVERY 6 HOURS PRN
Status: DISCONTINUED | OUTPATIENT
Start: 2020-07-01 | End: 2020-07-03 | Stop reason: HOSPADM

## 2020-07-01 RX ORDER — TAMSULOSIN HYDROCHLORIDE 0.4 MG/1
0.8 CAPSULE ORAL EVERY EVENING
Status: DISCONTINUED | OUTPATIENT
Start: 2020-07-01 | End: 2020-07-03 | Stop reason: HOSPADM

## 2020-07-01 RX ORDER — ACETAMINOPHEN 325 MG/1
TABLET ORAL
Status: COMPLETED
Start: 2020-07-01 | End: 2020-07-01

## 2020-07-01 RX ORDER — AMOXICILLIN 250 MG
2 CAPSULE ORAL 2 TIMES DAILY
Status: DISCONTINUED | OUTPATIENT
Start: 2020-07-01 | End: 2020-07-03 | Stop reason: HOSPADM

## 2020-07-01 RX ORDER — TAMSULOSIN HYDROCHLORIDE 0.4 MG/1
0.8 CAPSULE ORAL EVERY EVENING
COMMUNITY
End: 2020-09-10 | Stop reason: SDUPTHER

## 2020-07-01 RX ORDER — HYDROXYUREA 500 MG/1
500 CAPSULE ORAL 2 TIMES DAILY
Status: DISCONTINUED | OUTPATIENT
Start: 2020-07-01 | End: 2020-07-03 | Stop reason: HOSPADM

## 2020-07-01 RX ORDER — FINASTERIDE 5 MG/1
5 TABLET, FILM COATED ORAL EVERY EVENING
COMMUNITY
End: 2020-10-23 | Stop reason: SDUPTHER

## 2020-07-01 RX ORDER — INSULIN GLARGINE 100 [IU]/ML
18 INJECTION, SOLUTION SUBCUTANEOUS EVERY EVENING
Status: DISCONTINUED | OUTPATIENT
Start: 2020-07-01 | End: 2020-07-03 | Stop reason: HOSPADM

## 2020-07-01 RX ORDER — ONDANSETRON 2 MG/ML
4 INJECTION INTRAMUSCULAR; INTRAVENOUS ONCE
Status: COMPLETED | OUTPATIENT
Start: 2020-07-01 | End: 2020-07-01

## 2020-07-01 RX ADMIN — APIXABAN 5 MG: 5 TABLET, FILM COATED ORAL at 17:27

## 2020-07-01 RX ADMIN — ONDANSETRON 4 MG: 4 TABLET, ORALLY DISINTEGRATING ORAL at 20:42

## 2020-07-01 RX ADMIN — SENNOSIDES-DOCUSATE SODIUM TAB 8.6-50 MG 2 TABLET: 8.6-5 TAB at 17:26

## 2020-07-01 RX ADMIN — METHYLPREDNISOLONE SODIUM SUCCINATE 125 MG: 125 INJECTION, POWDER, FOR SOLUTION INTRAMUSCULAR; INTRAVENOUS at 17:32

## 2020-07-01 RX ADMIN — OXYCODONE HYDROCHLORIDE 10 MG: 10 TABLET ORAL at 20:41

## 2020-07-01 RX ADMIN — OXYCODONE HYDROCHLORIDE 10 MG: 10 TABLET ORAL at 14:37

## 2020-07-01 RX ADMIN — LIDOCAINE HYDROCHLORIDE 15 ML: 20 SOLUTION OROPHARYNGEAL at 07:30

## 2020-07-01 RX ADMIN — FOLIC ACID 1 MG: 1 TABLET ORAL at 14:37

## 2020-07-01 RX ADMIN — ONDANSETRON 4 MG: 2 INJECTION INTRAMUSCULAR; INTRAVENOUS at 07:30

## 2020-07-01 RX ADMIN — OMEPRAZOLE 20 MG: 20 CAPSULE, DELAYED RELEASE ORAL at 14:37

## 2020-07-01 RX ADMIN — METHYLPREDNISOLONE SODIUM SUCCINATE 125 MG: 125 INJECTION, POWDER, FOR SOLUTION INTRAMUSCULAR; INTRAVENOUS at 14:39

## 2020-07-01 RX ADMIN — TAMSULOSIN HYDROCHLORIDE 0.8 MG: 0.4 CAPSULE ORAL at 17:27

## 2020-07-01 RX ADMIN — Medication: at 18:46

## 2020-07-01 ASSESSMENT — LIFESTYLE VARIABLES
TOTAL SCORE: 0
HAVE PEOPLE ANNOYED YOU BY CRITICIZING YOUR DRINKING: NO
CONSUMPTION TOTAL: NEGATIVE
HOW MANY TIMES IN THE PAST YEAR HAVE YOU HAD 5 OR MORE DRINKS IN A DAY: 0
HAVE YOU EVER FELT YOU SHOULD CUT DOWN ON YOUR DRINKING: NO
TOTAL SCORE: 0
EVER_SMOKED: NEVER
ON A TYPICAL DAY WHEN YOU DRINK ALCOHOL HOW MANY DRINKS DO YOU HAVE: 0
AVERAGE NUMBER OF DAYS PER WEEK YOU HAVE A DRINK CONTAINING ALCOHOL: 0
ALCOHOL_USE: NO
TOTAL SCORE: 0
EVER FELT BAD OR GUILTY ABOUT YOUR DRINKING: NO
EVER HAD A DRINK FIRST THING IN THE MORNING TO STEADY YOUR NERVES TO GET RID OF A HANGOVER: NO

## 2020-07-01 ASSESSMENT — CHA2DS2 SCORE
HYPERTENSION: NO
VASCULAR DISEASE: YES
PRIOR STROKE OR TIA OR THROMBOEMBOLISM: YES
CHA2DS2 VASC SCORE: 5
AGE 75 OR GREATER: NO
CHF OR LEFT VENTRICULAR DYSFUNCTION: NO
DIABETES: YES
SEX: MALE
AGE 65 TO 74: YES

## 2020-07-01 ASSESSMENT — COGNITIVE AND FUNCTIONAL STATUS - GENERAL
SUGGESTED CMS G CODE MODIFIER DAILY ACTIVITY: CJ
MOVING FROM LYING ON BACK TO SITTING ON SIDE OF FLAT BED: A LITTLE
TURNING FROM BACK TO SIDE WHILE IN FLAT BAD: A LITTLE
MOBILITY SCORE: 20
SUGGESTED CMS G CODE MODIFIER MOBILITY: CK
HELP NEEDED FOR BATHING: A LITTLE
CLIMB 3 TO 5 STEPS WITH RAILING: A LITTLE
SUGGESTED CMS G CODE MODIFIER MOBILITY: CJ
PERSONAL GROOMING: A LITTLE
STANDING UP FROM CHAIR USING ARMS: A LITTLE
CLIMB 3 TO 5 STEPS WITH RAILING: A LITTLE
DAILY ACTIVITIY SCORE: 20
DRESSING REGULAR UPPER BODY CLOTHING: A LITTLE
WALKING IN HOSPITAL ROOM: A LITTLE
DRESSING REGULAR LOWER BODY CLOTHING: A LITTLE
WALKING IN HOSPITAL ROOM: A LITTLE
HELP NEEDED FOR BATHING: A LITTLE
STANDING UP FROM CHAIR USING ARMS: A LITTLE
TOILETING: A LITTLE
DRESSING REGULAR LOWER BODY CLOTHING: A LITTLE
MOVING TO AND FROM BED TO CHAIR: A LITTLE
DRESSING REGULAR UPPER BODY CLOTHING: A LITTLE
MOVING FROM LYING ON BACK TO SITTING ON SIDE OF FLAT BED: A LITTLE
MOBILITY SCORE: 18

## 2020-07-01 ASSESSMENT — ENCOUNTER SYMPTOMS
CLAUDICATION: 0
SPEECH CHANGE: 0
DIARRHEA: 0
BLURRED VISION: 0
SHORTNESS OF BREATH: 1
FEVER: 0
HEARTBURN: 0
SORE THROAT: 0
CHILLS: 0
COUGH: 0
DEPRESSION: 0
SENSORY CHANGE: 0
INSOMNIA: 0
NERVOUS/ANXIOUS: 0
DIZZINESS: 0
WEAKNESS: 0
PHOTOPHOBIA: 0
HEADACHES: 0
VOMITING: 0
MYALGIAS: 1
ABDOMINAL PAIN: 0
CONSTIPATION: 0

## 2020-07-01 ASSESSMENT — PATIENT HEALTH QUESTIONNAIRE - PHQ9
1. LITTLE INTEREST OR PLEASURE IN DOING THINGS: NOT AT ALL
2. FEELING DOWN, DEPRESSED, IRRITABLE, OR HOPELESS: NOT AT ALL
SUM OF ALL RESPONSES TO PHQ9 QUESTIONS 1 AND 2: 0
2. FEELING DOWN, DEPRESSED, IRRITABLE, OR HOPELESS: NOT AT ALL
1. LITTLE INTEREST OR PLEASURE IN DOING THINGS: NOT AT ALL
SUM OF ALL RESPONSES TO PHQ9 QUESTIONS 1 AND 2: 0

## 2020-07-01 ASSESSMENT — FIBROSIS 4 INDEX
FIB4 SCORE: 2.16
FIB4 SCORE: 0.91

## 2020-07-01 NOTE — PROGRESS NOTES
Report received. Assumed care of pt. Assessment complete. Pt A&Ox4. Pt in no apparent signs of distress. C/o pain, MD notified, new orders received. Medicated pt per MAR. POC discussed. Call light within reach. Bed in low position. All needs are met at this time.

## 2020-07-01 NOTE — ED NOTES
Med rec updated and complete  Allergies reviewed  Pt reports that his METHOTREXATE 2.5MG bothers his stomach, he takes 2 tablets on Thursday and 2 more tablets on Friday.  Pt reports that he was on DOXYCYCLINE 100MG on 6/22/2020 for 10 day course, but made him sick stopped on 6/24/2020

## 2020-07-01 NOTE — ED PROVIDER NOTES
"ED Provider Note    CHIEF COMPLAINT  Chief Complaint   Patient presents with   • Dizziness     present for past 2 days, c/i lighheaded, feeling like he is going to \"pass out\"   • Chest Pain     present for past two days       HPI  Francesco Schulte is a 69 y.o. male with a history of lupus, dyslipidemia, ischemic cardiomyopathy who presents complaining of chest pain, shortness of breath, lip swelling, vomiting, difficulty swallowing.    Patient states the symptoms have been present for the last 2 days.  They began proximately 4 hours after he took his first dose of doxycycline which was prescribed to him by his dermatologist following a biopsy of his left anterior thigh and removal of an infected cyst on his back.    Patient reports the chest pain is substernal, pressure-like, constant for the last 2 days, nonexertional, nonradiating, \"and different from the chest pain I have had with my heart attacks.\"  Pain is worse with swallowing.  Patient also reports worsening arthralgia consistent with a lupus flare.    Patient denies tearing back pain, arm pain, leg swelling, calf pain, cough, hemoptysis, fever, chills.      ALLERGIES  Allergies   Allergen Reactions   • Beta Adrenergic Blockers Unspecified     dizziness   • Doxycycline      Swelling lips and difficulties swallowing   • Metformin Unspecified and Palpitations     Cardiac effects  \"Similar to a heart attack, I was hospitalized\"   • Simvastatin      Other reaction(s): Other (Specify with Comments)  Myalgias  Myalgias   • Statins [Hmg-Coa-R Inhibitors]      Muscle ache, joint pain       CURRENT MEDICATIONS  Home Medications     Reviewed by Usman Zacarias (Pharmacy Tech) on 07/01/20 at 0742  Med List Status: Complete   Medication Last Dose Status   apixaban (ELIQUIS) 5mg Tab 7/1/2020 Active   aspirin EC (ECOTRIN) 81 MG Tablet Delayed Response 7/1/2020 Active   ciclopirox (LOPROX) 0.77 % cream 6/30/2020 Active   doxycycline (VIBRAMYCIN) 100 MG Tab " "6/24/2020 Active   finasteride (PROSCAR) 5 MG Tab 6/30/2020 Active   folic acid (FOLVITE) 1 MG Tab 7/1/2020 Active   hydroxyurea (HYDREA) 500 MG Cap 7/1/2020 Active   Insulin Degludec (TRESIBA FLEXTOUCH) 200 UNIT/ML Solution Pen-injector 6/30/2020 Active   methotrexate 2.5 MG Tab 6/26/2020 Active   naproxen (NAPROSYN) 250 MG Tab 6/30/2020 Active   tamsulosin (FLOMAX) 0.4 MG capsule 6/30/2020 Active   therapeutic multivitamin-minerals (THERAGRAN-M) Tab 6/30/2020 Active                PAST MEDICAL HISTORY   has a past medical history of Anesthesia, Cancer ([facility]), Diabetes ([facility]), Family history of punctured lung (2002), Heart attack ([facility]) (03/2017), Hemorrhagic disorder ([facility]), High cholesterol, Ischemic cardiomyopathy, Kidney stones, Orthostatic hypotension (3/29/2018), Pain, Polycythemia vera(238.4), Stroke ([facility]) (2016), Urinary bladder disorder, and Vertigo.    SURGICAL HISTORY   has a past surgical history that includes other cardiac surgery; cath placement; laminotomy; appendectomy; carotid endarterectomy (Right, 3/21/2018); and temporal artery biopsy (Right, 6/26/2018).    SOCIAL HISTORY  Social History     Tobacco Use   • Smoking status: Never Smoker   • Smokeless tobacco: Never Used   Substance and Sexual Activity   • Alcohol use: No   • Drug use: No   • Sexual activity: Not on file           REVIEW OF SYSTEMS  See HPI for further details.  All other systems are negative except as above in HPI.    PHYSICAL EXAM  VITAL SIGNS: /61   Pulse 77   Temp 36.1 °C (96.9 °F) (Temporal)   Resp 17   Ht 1.905 m (6' 3\")   Wt 89.5 kg (197 lb 5 oz)   SpO2 99%   BMI 24.66 kg/m²     General:  WDWN, frail-appearing male, walking slowly with a cane; A+Ox3; V/S as above  Skin: warm and dry; pale color; no rash  HEENT: NCAT; EOMs intact; PERRL; no scleral icterus; lips chapped with dried blood and slightly edematous; oropharynx clear without lesions or swelling  Neck: FROM; no meningismus, no LAD; no " stridor  Cardiovascular: Regular heart rate and rhythm.  No murmurs, rubs, or gallops; pulses 2+ bilaterally radially and DP areas; no chest wall tenderness or crepitus  Lungs: Clear to auscultation with decreased air movement bilaterally.  No wheezes, rhonchi, or rales.   Abdomen: BS present; soft; NTND; no rebound, guarding, or rigidity.  No organomegaly or pulsatile mass  Extremities: MALCOLM x 4; scabbed procedural incision over the left anterior thigh; no pedal edema; neg Olga Lidia's  Neurologic: CNs III-XII grossly intact; speech clear; distal sensation intact; strength 5/5 UE/LEs; gait is slow with a cane  Psychiatric: Appropriate affect, normal mood    LABS  Results for orders placed or performed during the hospital encounter of 07/01/20   CBC with Differential   Result Value Ref Range    WBC 4.9 4.8 - 10.8 K/uL    RBC 5.05 4.70 - 6.10 M/uL    Hemoglobin 13.3 (L) 14.0 - 18.0 g/dL    Hematocrit 43.6 42.0 - 52.0 %    MCV 86.3 81.4 - 97.8 fL    MCH 26.3 (L) 27.0 - 33.0 pg    MCHC 30.5 (L) 33.7 - 35.3 g/dL    RDW 54.1 (H) 35.9 - 50.0 fL    Platelet Count 190 164 - 446 K/uL    MPV 13.0 (H) 9.0 - 12.9 fL    Neutrophils-Polys 79.00 (H) 44.00 - 72.00 %    Lymphocytes 5.00 (L) 22.00 - 41.00 %    Monocytes 9.00 0.00 - 13.40 %    Eosinophils 1.00 0.00 - 6.90 %    Basophils 6.00 (H) 0.00 - 1.80 %    Nucleated RBC 0.00 /100 WBC    Neutrophils (Absolute) 3.87 1.82 - 7.42 K/uL    Lymphs (Absolute) 0.25 (L) 1.00 - 4.80 K/uL    Monos (Absolute) 0.44 0.00 - 0.85 K/uL    Eos (Absolute) 0.05 0.00 - 0.51 K/uL    Baso (Absolute) 0.29 (H) 0.00 - 0.12 K/uL    NRBC (Absolute) 0.00 K/uL    Hypochromia 1+     Macrocytosis 1+    Complete Metabolic Panel (CMP)   Result Value Ref Range    Sodium 138 135 - 145 mmol/L    Potassium 3.7 3.6 - 5.5 mmol/L    Chloride 102 96 - 112 mmol/L    Co2 26 20 - 33 mmol/L    Anion Gap 10.0 7.0 - 16.0    Glucose 141 (H) 65 - 99 mg/dL    Bun 16 8 - 22 mg/dL    Creatinine 0.98 0.50 - 1.40 mg/dL    Calcium 9.2 8.4 -  10.2 mg/dL    AST(SGOT) 26 12 - 45 U/L    ALT(SGPT) 18 2 - 50 U/L    Alkaline Phosphatase 64 30 - 99 U/L    Total Bilirubin 0.5 0.1 - 1.5 mg/dL    Albumin 3.5 3.2 - 4.9 g/dL    Total Protein 6.8 6.0 - 8.2 g/dL    Globulin 3.3 1.9 - 3.5 g/dL    A-G Ratio 1.1 g/dL   Troponin   Result Value Ref Range    Troponin T 18 6 - 19 ng/L   proBrain Natriuretic Peptide, NT   Result Value Ref Range    NT-proBNP 1576 (H) 0 - 125 pg/mL   LIPASE   Result Value Ref Range    Lipase 45 7 - 58 U/L   ESTIMATED GFR   Result Value Ref Range    GFR If African American >60 >60 mL/min/1.73 m 2    GFR If Non African American >60 >60 mL/min/1.73 m 2   DIFFERENTIAL MANUAL   Result Value Ref Range    Manual Diff Status PERFORMED    PLATELET ESTIMATE   Result Value Ref Range    Plt Estimation Normal    MORPHOLOGY   Result Value Ref Range    RBC Morphology Present     Poikilocytosis 1+     Ovalocytes 1+     Hypersegmented Poly Few    EKG   Result Value Ref Range    Report       [facility]    Test Date:  2020  Pt Name:    MARZENA ROMEO             Department: [facility]  MRN:        [id number]                      Room:       -ROOM 1  Gender:     Male                         Technician: [id number]  :        1950                   Requested By:LARRY AVENDANO  Order #:    [id number]                    Reading MD: LARRY AVENDANO MD    Measurements  Intervals                                Axis  Rate:       81                           P:          39  AR:         169                          QRS:        -41  QRSD:       114                          T:          119  QT:         411  QTc:        477    Interpretive Statements  Sinus rhythm  Probable left atrial enlargement  Left anterior fascicular block  LVH with secondary repolarization abnormality  Probable anterior infarct, age indeterminate  Compared to ECG 2019 16:34:32  Left anterior fascicular block now present  Left ventricular hypertrophy now  "present  E monika repolarization now present  Myocardial infarct finding now present  Ventricular premature complex(es) no longer present  Electronically Signed On 2020 7:15:20 PDT by LARRY AVENDANO MD     EKG   Result Value Ref Range    Report       [facility] Emergency Dept.    Test Date:  2020  Pt Name:    FRANCESCO SCHULTE             Department: [facility]  MRN:        [id number]                      [facility]  Gender:     Male                         Technician: [id number]  :        1950                   Requested By:LARRY AVENDANO  Order #:    [id number]                    Reading MD:    Measurements  Intervals                                Axis  Rate:       71                           P:          35  VA:         188                          QRS:        -42  QRSD:       114                          T:          132  QT:         432  QTc:        470    Interpretive Statements  SINUS RHYTHM  MULTIPLE VENTRICULAR PREMATURE COMPLEXES  INCOMPLETE LEFT BUNDLE BRANCH BLOCK  ANTERIOR INFARCT, AGE INDETERMINATE  Compared to ECG 2020 06:51:56  Ventricular premature complex(es) now present  Left bundle-branch block now present  Left anterior fascicular block no longer present  Left ventricu lar hypertrophy no longer present  Early repolarization no longer present  Myocardial infarct finding still present           IMAGING  DX-CHEST-PORTABLE (1 VIEW)   Final Result      Stable cardiac silhouette enlargement without consolidation identified          MEDICAL RECORD  I have reviewed patient's medical record and pertinent results are listed below.      COURSE & MEDICAL DECISION MAKING  I have reviewed any medical record information, laboratory studies and radiographic results as noted.    Francesco Schulte is a 69 y.o. male who presents complaining of chest pain, nausea, shortness of breath.  Patient reports \"I am going to pass out\" while I move him from the chair to " the stretcher in the room.  Patient is frail-appearing.  Patient attributes his symptoms to an allergic reaction to doxycycline that he has been taking per his dermatologist.  No concern for anaphylaxis.  The patient's lips are swollen but more chapped than angioedema-like.      Appropriate PPE was worn at all times while interacting with the patient, including goggles, N95 mask, and surgical mask.      EKG demonstrates lateral T wave inversions and an incomplete left bundle branch block which are unchanged from prior EKG.    Patient was given a GI cocktail and nausea    Repeat EKG obtained given patient's ongoing chest discomfort.    Chest x-ray demonstrates cardiomegaly which is stable without consolidation or pulmonary edema.    Labs demonstrate a troponin of 18 and BNP of 1576 which may indicate CHF.  Lipase is normal as is his white blood cell count.    Patient was advised of the plan to admit.  He is amenable to this.    8:34 AM  I discussed the case with Dr. Regan from the hospitalist service who agrees to hospitalize the patient.      FINAL IMPRESSION  1. Chest pain, unspecified type     2. Shortness of breath       Electronically signed by: Anastacia Greene M.D., 7/1/2020 6:53 AM

## 2020-07-01 NOTE — ED TRIAGE NOTES
"Chief Complaint   Patient presents with   • Dizziness     present for past 2 days, c/i lighheaded, feeling like he is going to \"pass out\"   • Chest Pain     present for past two days     /81   Pulse 85   Temp 36.1 °C (96.9 °F) (Temporal)   Resp 14   Ht 1.905 m (6' 3\")   Wt 89.5 kg (197 lb 5 oz)   SpO2 96%   BMI 24.66 kg/m²     Pt arrived w/ above concern, has been taking doxycycline for infection.    Mask on pt.   "

## 2020-07-01 NOTE — ASSESSMENT & PLAN NOTE
Exacerbation symptoms started prior to biopsy, worsened with initiation of prophylactic doxycycline  Dr Horan is his rheumatologist  Continue with Solumedrol 125mg every 6 hours and transition to prednisone upon discharge

## 2020-07-01 NOTE — H&P
"Hospital Medicine History & Physical Note    Date of Service  7/1/2020    Primary Care Physician  Malissa Thomson M.D.    Code Status  Full Code    Chief Complaint  Chief Complaint   Patient presents with   • Dizziness     present for past 2 days, c/i lighheaded, feeling like he is going to \"pass out\"   • Chest Pain     present for past two days       History of Presenting Illness  69 y.o. male who presented 7/1/2020 with onset of what felt like increasing symptoms of lupus about 4 days prior.  He has been on methotrexate treatment with his rheumatologist, Dr Quiroz, but feels that it isn't helping much at this time and has an appointment with him on Monday, July 6th.  He had a biopsy and excision of a cyst days 2 ago by his dermatologist and was placed on doxycycline empirically and from the first pill he didn't tolerate it well.  He threw up the first one and when he was able to keep the medication down, his lips began to swell and he started developing sores in his mouth.  He stopped taking the doxycyline and the lip swelling has improved but is not yet back to baseline.  He had some chest pressure, different from any of his previous heart attack and denies chest pain.  His presentation does not sound like cardiac in nature, more of a worsening exacerbation of his lupus worsened by intolerance to doxycycline.    Review of Systems  Review of Systems   Constitutional: Positive for malaise/fatigue. Negative for chills and fever.   HENT: Negative for congestion and sore throat.         Lips swelling and oral sores     Eyes: Negative for blurred vision and photophobia.   Respiratory: Positive for shortness of breath (with exertion). Negative for cough.    Cardiovascular: Negative for chest pain, claudication and leg swelling.   Gastrointestinal: Negative for abdominal pain, constipation, diarrhea, heartburn and vomiting.   Genitourinary: Negative for dysuria and hematuria.   Musculoskeletal: Positive for myalgias " "(hips and knees bilaterally). Negative for joint pain.   Skin: Negative for itching and rash.   Neurological: Negative for dizziness, sensory change, speech change, weakness and headaches.   Psychiatric/Behavioral: Negative for depression. The patient is not nervous/anxious and does not have insomnia.        Past Medical History   has a past medical history of Anesthesia, Cancer ([facility]), Diabetes ([facility]), Family history of punctured lung (2002), Heart attack ([facility]) (03/2017), Hemorrhagic disorder ([facility]), High cholesterol, Ischemic cardiomyopathy, Kidney stones, Orthostatic hypotension (3/29/2018), Pain, Polycythemia vera(238.4), Stroke ([facility]) (2016), Urinary bladder disorder, and Vertigo.    Surgical History   has a past surgical history that includes other cardiac surgery; cath placement; laminotomy; appendectomy; carotid endarterectomy (Right, 3/21/2018); and temporal artery biopsy (Right, 6/26/2018).     Family History  family history is not on file.     Social History   reports that he has never smoked. He has never used smokeless tobacco. He reports that he does not drink alcohol or use drugs.    Allergies  Allergies   Allergen Reactions   • Beta Adrenergic Blockers Unspecified     dizziness   • Doxycycline      Swelling lips and difficulties swallowing   • Metformin Unspecified and Palpitations     Cardiac effects  \"Similar to a heart attack, I was hospitalized\"   • Simvastatin      Other reaction(s): Other (Specify with Comments)  Myalgias  Myalgias   • Statins [Hmg-Coa-R Inhibitors]      Muscle ache, joint pain       Medications  Prior to Admission Medications   Prescriptions Last Dose Informant Patient Reported? Taking?   Insulin Degludec (TRESIBA FLEXTOUCH) 200 UNIT/ML Solution Pen-injector 6/30/2020 at 1700 Patient Yes Yes   Sig: Inject 18 Units as instructed every evening.   apixaban (ELIQUIS) 5mg Tab 7/1/2020 at 0600 Patient No No   Sig: Take 1 Tab by mouth 2 Times a Day.   aspirin EC (ECOTRIN) 81 MG Tablet " Delayed Response 7/1/2020 at 0600 Patient Yes No   Sig: Take 1 Tab by mouth every day.   ciclopirox (LOPROX) 0.77 % cream 6/30/2020 at 1200 Patient Yes Yes   Sig: Apply 1 Application to affected area(s) every day. AAA legs/buttocks for rash   finasteride (PROSCAR) 5 MG Tab 6/30/2020 at 1700 Patient Yes Yes   Sig: Take 5 mg by mouth every evening.   folic acid (FOLVITE) 1 MG Tab 7/1/2020 at 0600 Patient Yes No   Sig: Take 1 mg by mouth every day.   hydroxyurea (HYDREA) 500 MG Cap 7/1/2020 at 0600 Patient Yes No   Sig: Take 500 mg by mouth 2 Times a Day.   methotrexate 2.5 MG Tab 6/26/2020 at AM Patient Yes No   Sig: Take 5 mg by mouth See Admin Instructions. Pt reports that he takes 2 tablets on Thurs and then another 2 tablets on Friday, pt reports that it ok with his doctor.   naproxen (NAPROSYN) 250 MG Tab 6/30/2020 at 1700 Patient Yes Yes   Sig: Take 500-750 mg by mouth as needed. Indications: Pain   tamsulosin (FLOMAX) 0.4 MG capsule 6/30/2020 at 1700 Patient Yes Yes   Sig: Take 0.8 mg by mouth every evening.   therapeutic multivitamin-minerals (THERAGRAN-M) Tab 6/30/2020 at 2000 Patient Yes No   Sig: Take 1 Tab by mouth every evening.      Facility-Administered Medications: None       Physical Exam  Temp:  [36.1 °C (96.9 °F)] 36.1 °C (96.9 °F)  Pulse:  [71-85] 72  Resp:  [14-21] 21  BP: (102-140)/(58-86) 116/75  SpO2:  [95 %-99 %] 97 %    Physical Exam  Vitals signs and nursing note reviewed.   Constitutional:       General: He is not in acute distress.     Appearance: Normal appearance.   HENT:      Head: Normocephalic and atraumatic.      Mouth/Throat:      Comments: Lips swollen, cracked and irritated    Eyes:      General: No scleral icterus.     Extraocular Movements: Extraocular movements intact.   Neck:      Musculoskeletal: Normal range of motion and neck supple.   Cardiovascular:      Rate and Rhythm: Normal rate and regular rhythm.      Pulses: Normal pulses.      Heart sounds: Normal heart sounds. No  murmur.   Pulmonary:      Effort: Pulmonary effort is normal. No respiratory distress.      Breath sounds: Normal breath sounds. No wheezing, rhonchi or rales.   Abdominal:      General: Abdomen is flat. Bowel sounds are normal. There is no distension.      Palpations: Abdomen is soft.      Tenderness: There is no rebound.   Musculoskeletal:         General: No swelling or tenderness.      Comments: Tenderness with moving lower extremities     Lymphadenopathy:      Cervical: No cervical adenopathy.   Skin:     Coloration: Skin is not jaundiced.      Findings: No erythema.   Neurological:      General: No focal deficit present.      Mental Status: He is alert and oriented to person, place, and time. Mental status is at baseline.      Cranial Nerves: No cranial nerve deficit.   Psychiatric:         Mood and Affect: Mood normal.         Behavior: Behavior normal.         Laboratory:  Recent Labs     07/01/20  0703   WBC 4.9   RBC 5.05   HEMOGLOBIN 13.3*   HEMATOCRIT 43.6   MCV 86.3   MCH 26.3*   MCHC 30.5*   RDW 54.1*   PLATELETCT 190   MPV 13.0*     Recent Labs     07/01/20  0703   SODIUM 138   POTASSIUM 3.7   CHLORIDE 102   CO2 26   GLUCOSE 141*   BUN 16   CREATININE 0.98   CALCIUM 9.2     Recent Labs     07/01/20  0703   ALTSGPT 18   ASTSGOT 26   ALKPHOSPHAT 64   TBILIRUBIN 0.5   LIPASE 45   GLUCOSE 141*         Recent Labs     07/01/20  0703   NTPROBNP 1576*         Recent Labs     07/01/20  0703   TROPONINT 18       Imaging:  DX-CHEST-PORTABLE (1 VIEW)   Final Result      Stable cardiac silhouette enlargement without consolidation identified      EC-ECHOCARDIOGRAM COMPLETE W/O CONT    (Results Pending)         Assessment/Plan:  * Lupus (HCC)- (present on admission)  Assessment & Plan  Exacerbation symptoms started prior to biopsy, worsened with initiation of prophylactic doxycycline  Dr Quiroz is his rheumatologist  Start Solumedrol 125mg every 6 hours       Paroxysmal atrial fibrillation (HCC)- (present on  admission)  Assessment & Plan  Rate controlled  Continue eliquis      Generalized pain  Assessment & Plan  Should respond to steroids with treatment of his lupus flare      Medication intolerance  Assessment & Plan  Lip swelling, oral ulcers  Worsened symptoms of lupus exacerbation      Lip swelling  Assessment & Plan  Should respond to steroids  No issue with swallowing or airway preservation      Polycythemia vera (HCC)- (present on admission)  Assessment & Plan  Follows with Dr Sims  Counts have been stable      Agent orange exposure- (present on admission)  Assessment & Plan  .

## 2020-07-02 ENCOUNTER — TELEPHONE (OUTPATIENT)
Dept: DERMATOLOGY | Facility: IMAGING CENTER | Age: 70
End: 2020-07-02

## 2020-07-02 PROBLEM — R73.9 HYPERGLYCEMIA: Status: ACTIVE | Noted: 2020-07-02

## 2020-07-02 LAB
ANION GAP SERPL CALC-SCNC: 11 MMOL/L (ref 7–16)
BUN SERPL-MCNC: 26 MG/DL (ref 8–22)
CALCIUM SERPL-MCNC: 8.9 MG/DL (ref 8.4–10.2)
CHLORIDE SERPL-SCNC: 99 MMOL/L (ref 96–112)
CO2 SERPL-SCNC: 21 MMOL/L (ref 20–33)
CREAT SERPL-MCNC: 0.95 MG/DL (ref 0.5–1.4)
ERYTHROCYTE [DISTWIDTH] IN BLOOD BY AUTOMATED COUNT: 53.8 FL (ref 35.9–50)
GLUCOSE BLD-MCNC: 357 MG/DL (ref 65–99)
GLUCOSE BLD-MCNC: 362 MG/DL (ref 65–99)
GLUCOSE BLD-MCNC: 365 MG/DL (ref 65–99)
GLUCOSE BLD-MCNC: 413 MG/DL (ref 65–99)
GLUCOSE SERPL-MCNC: 390 MG/DL (ref 65–99)
HCT VFR BLD AUTO: 44.5 % (ref 42–52)
HGB BLD-MCNC: 13.5 G/DL (ref 14–18)
MCH RBC QN AUTO: 26.2 PG (ref 27–33)
MCHC RBC AUTO-ENTMCNC: 30.3 G/DL (ref 33.7–35.3)
MCV RBC AUTO: 86.4 FL (ref 81.4–97.8)
PLATELET # BLD AUTO: 144 K/UL (ref 164–446)
POTASSIUM SERPL-SCNC: 4.3 MMOL/L (ref 3.6–5.5)
RBC # BLD AUTO: 5.15 M/UL (ref 4.7–6.1)
SODIUM SERPL-SCNC: 131 MMOL/L (ref 135–145)
WBC # BLD AUTO: 5.3 K/UL (ref 4.8–10.8)

## 2020-07-02 PROCEDURE — 96372 THER/PROPH/DIAG INJ SC/IM: CPT

## 2020-07-02 PROCEDURE — 82962 GLUCOSE BLOOD TEST: CPT | Mod: 91

## 2020-07-02 PROCEDURE — 700102 HCHG RX REV CODE 250 W/ 637 OVERRIDE(OP)

## 2020-07-02 PROCEDURE — 85027 COMPLETE CBC AUTOMATED: CPT

## 2020-07-02 PROCEDURE — A9270 NON-COVERED ITEM OR SERVICE: HCPCS | Performed by: INTERNAL MEDICINE

## 2020-07-02 PROCEDURE — 99233 SBSQ HOSP IP/OBS HIGH 50: CPT | Performed by: INTERNAL MEDICINE

## 2020-07-02 PROCEDURE — 700102 HCHG RX REV CODE 250 W/ 637 OVERRIDE(OP): Performed by: INTERNAL MEDICINE

## 2020-07-02 PROCEDURE — 96376 TX/PRO/DX INJ SAME DRUG ADON: CPT

## 2020-07-02 PROCEDURE — 700111 HCHG RX REV CODE 636 W/ 250 OVERRIDE (IP): Performed by: INTERNAL MEDICINE

## 2020-07-02 PROCEDURE — 770020 HCHG ROOM/CARE - TELE (206)

## 2020-07-02 PROCEDURE — 80048 BASIC METABOLIC PNL TOTAL CA: CPT

## 2020-07-02 RX ORDER — DEXTROSE MONOHYDRATE 25 G/50ML
50 INJECTION, SOLUTION INTRAVENOUS
Status: DISCONTINUED | OUTPATIENT
Start: 2020-07-02 | End: 2020-07-03 | Stop reason: HOSPADM

## 2020-07-02 RX ADMIN — TAMSULOSIN HYDROCHLORIDE 0.8 MG: 0.4 CAPSULE ORAL at 17:26

## 2020-07-02 RX ADMIN — INSULIN HUMAN 12 UNITS: 100 INJECTION, SOLUTION PARENTERAL at 06:47

## 2020-07-02 RX ADMIN — LIDOCAINE HYDROCHLORIDE 5 ML: 20 SOLUTION OROPHARYNGEAL at 11:10

## 2020-07-02 RX ADMIN — Medication: at 17:28

## 2020-07-02 RX ADMIN — FINASTERIDE 5 MG: 5 TABLET, FILM COATED ORAL at 17:27

## 2020-07-02 RX ADMIN — METHYLPREDNISOLONE SODIUM SUCCINATE 125 MG: 125 INJECTION, POWDER, FOR SOLUTION INTRAMUSCULAR; INTRAVENOUS at 05:31

## 2020-07-02 RX ADMIN — ASPIRIN 81 MG: 81 TABLET, COATED ORAL at 05:31

## 2020-07-02 RX ADMIN — METHYLPREDNISOLONE SODIUM SUCCINATE 125 MG: 125 INJECTION, POWDER, FOR SOLUTION INTRAMUSCULAR; INTRAVENOUS at 23:09

## 2020-07-02 RX ADMIN — APIXABAN 5 MG: 5 TABLET, FILM COATED ORAL at 05:31

## 2020-07-02 RX ADMIN — OXYCODONE HYDROCHLORIDE 10 MG: 10 TABLET ORAL at 19:11

## 2020-07-02 RX ADMIN — INSULIN HUMAN 12 UNITS: 100 INJECTION, SOLUTION PARENTERAL at 20:28

## 2020-07-02 RX ADMIN — METHYLPREDNISOLONE SODIUM SUCCINATE 125 MG: 125 INJECTION, POWDER, FOR SOLUTION INTRAMUSCULAR; INTRAVENOUS at 11:10

## 2020-07-02 RX ADMIN — INSULIN GLARGINE 18 UNITS: 100 INJECTION, SOLUTION SUBCUTANEOUS at 17:29

## 2020-07-02 RX ADMIN — METHYLPREDNISOLONE SODIUM SUCCINATE 125 MG: 125 INJECTION, POWDER, FOR SOLUTION INTRAMUSCULAR; INTRAVENOUS at 17:26

## 2020-07-02 RX ADMIN — Medication: at 11:10

## 2020-07-02 RX ADMIN — FOLIC ACID 1 MG: 1 TABLET ORAL at 08:08

## 2020-07-02 RX ADMIN — OMEPRAZOLE 20 MG: 20 CAPSULE, DELAYED RELEASE ORAL at 05:31

## 2020-07-02 RX ADMIN — OXYCODONE HYDROCHLORIDE 10 MG: 10 TABLET ORAL at 23:08

## 2020-07-02 RX ADMIN — OXYCODONE HYDROCHLORIDE 10 MG: 10 TABLET ORAL at 04:10

## 2020-07-02 RX ADMIN — INSULIN HUMAN 14 UNITS: 100 INJECTION, SOLUTION PARENTERAL at 11:08

## 2020-07-02 RX ADMIN — APIXABAN 5 MG: 5 TABLET, FILM COATED ORAL at 17:27

## 2020-07-02 RX ADMIN — METHYLPREDNISOLONE SODIUM SUCCINATE 125 MG: 125 INJECTION, POWDER, FOR SOLUTION INTRAMUSCULAR; INTRAVENOUS at 00:29

## 2020-07-02 RX ADMIN — Medication: at 05:30

## 2020-07-02 RX ADMIN — ONDANSETRON 4 MG: 4 TABLET, ORALLY DISINTEGRATING ORAL at 00:44

## 2020-07-02 RX ADMIN — LIDOCAINE HYDROCHLORIDE 5 ML: 20 SOLUTION OROPHARYNGEAL at 17:28

## 2020-07-02 RX ADMIN — INSULIN HUMAN 12 UNITS: 100 INJECTION, SOLUTION PARENTERAL at 17:28

## 2020-07-02 RX ADMIN — HYDROXYUREA 500 MG: 500 CAPSULE ORAL at 06:46

## 2020-07-02 RX ADMIN — OXYCODONE HYDROCHLORIDE 10 MG: 10 TABLET ORAL at 14:55

## 2020-07-02 RX ADMIN — LIDOCAINE HYDROCHLORIDE 5 ML: 20 SOLUTION OROPHARYNGEAL at 05:33

## 2020-07-02 RX ADMIN — HYDROXYUREA 500 MG: 500 CAPSULE ORAL at 17:28

## 2020-07-02 RX ADMIN — SENNOSIDES-DOCUSATE SODIUM TAB 8.6-50 MG 2 TABLET: 8.6-5 TAB at 05:30

## 2020-07-02 RX ADMIN — SENNOSIDES-DOCUSATE SODIUM TAB 8.6-50 MG 2 TABLET: 8.6-5 TAB at 17:27

## 2020-07-02 ASSESSMENT — ENCOUNTER SYMPTOMS
MYALGIAS: 1
CLAUDICATION: 0
CHILLS: 0
CONSTIPATION: 0
SPEECH CHANGE: 0
PHOTOPHOBIA: 0
BLURRED VISION: 0
INSOMNIA: 0
WEAKNESS: 0
HEADACHES: 0
COUGH: 0
DIARRHEA: 0
VOMITING: 0
SHORTNESS OF BREATH: 0
NERVOUS/ANXIOUS: 0
FEVER: 0
SENSORY CHANGE: 0
DIZZINESS: 0
HEARTBURN: 0
ABDOMINAL PAIN: 0
NAUSEA: 0
DEPRESSION: 0

## 2020-07-02 NOTE — CARE PLAN
Problem: Safety  Goal: Will remain free from injury  Outcome: PROGRESSING AS EXPECTED     Problem: Knowledge Deficit  Goal: Knowledge of disease process/condition, treatment plan, diagnostic tests, and medications will improve  Outcome: PROGRESSING AS EXPECTED    Educated pt on safety precautions and pt has verbalized understanding. Pt has demonstrated proper use of the call light and calls appropriately. Pt is wearing non slip socks, in bed in the low locked position and the call light is within reach Pt will learn about disease process and all questions addressed and answered.

## 2020-07-02 NOTE — PROGRESS NOTES
Report received from Georgina JORGENSEN. POC discussed. Pt resting comfortably in bed. Family at bedside. Safety precautions in place.

## 2020-07-02 NOTE — PROGRESS NOTES
Report received from JOSLYN Nair. Pt denies needs at this time. Safety precautions in place. Call light within reach.

## 2020-07-02 NOTE — PROGRESS NOTES
Hospital Medicine Daily Progress Note    Date of Service  7/2/2020    Chief Complaint  69 y.o. male admitted 7/1/2020 with weakness and lip swelling.    Hospital Course    69 y.o. male who presented 7/1/2020 with onset of what felt like increasing symptoms of lupus about 4 days prior.  He has been on methotrexate treatment with his rheumatologist, Dr Horan, but feels that it isn't helping much at this time and has an appointment with him on Monday, July 6th.  He had a biopsy and excision of a cyst days 2 ago by his dermatologist and was placed on doxycycline empirically and from the first pill he didn't tolerate it well.  He threw up the first one and when he was able to keep the medication down, his lips began to swell and he started developing sores in his mouth.  He stopped taking the doxycyline and the lip swelling has improved but is not yet back to baseline.  He had some chest pressure, different from any of his previous heart attack and denies chest pain.  His presentation does not sound like cardiac in nature, more of a worsening exacerbation of his lupus worsened by intolerance to doxycycline.         Interval Problem Update  Patient still having difficulty with much PO intake secondary to pain in mouth from sores but overall does feel better since initiation of the steroids.  MBX has been helping for about 30-60 minutes to help him eat and drink for a short period of time, will increase frequency and have him swish and spit as the benadryl and xylocaine could have a slight additive effect at more frequent dose.  Call out to Dr Horan to discuss patient today.    Consultants/Specialty  none    Code Status  full    Disposition  Home when appropriate.    Review of Systems  Review of Systems   Constitutional: Negative for chills and fever.   HENT: Negative for congestion.         Lip swelling and bleeding improved but still present     Eyes: Negative for blurred vision and photophobia.   Respiratory: Negative for  cough and shortness of breath.    Cardiovascular: Negative for chest pain, claudication and leg swelling.   Gastrointestinal: Negative for abdominal pain, constipation, diarrhea, heartburn, nausea and vomiting.   Genitourinary: Negative for dysuria and hematuria.   Musculoskeletal: Positive for myalgias (less severe, well controlled with pain medication.). Negative for joint pain.   Skin: Negative for itching and rash.   Neurological: Negative for dizziness, sensory change, speech change, weakness and headaches.   Psychiatric/Behavioral: Negative for depression. The patient is not nervous/anxious and does not have insomnia.         Physical Exam  Temp:  [36.4 °C (97.6 °F)-37.1 °C (98.7 °F)] 36.4 °C (97.6 °F)  Pulse:  [68-80] 73  Resp:  [16-18] 16  BP: (124-153)/(67-91) 126/74  SpO2:  [90 %-97 %] 90 %    Physical Exam  Vitals signs and nursing note reviewed.   Constitutional:       General: He is not in acute distress.     Appearance: Normal appearance.   HENT:      Head: Normocephalic and atraumatic.      Mouth/Throat:      Comments: Lips swollen, cracking and bleeding blisters - less severe than yesterday.    Eyes:      General: No scleral icterus.     Extraocular Movements: Extraocular movements intact.   Neck:      Musculoskeletal: Normal range of motion and neck supple.   Cardiovascular:      Rate and Rhythm: Normal rate and regular rhythm.      Pulses: Normal pulses.      Heart sounds: Normal heart sounds. No murmur.   Pulmonary:      Effort: Pulmonary effort is normal. No respiratory distress.      Breath sounds: Normal breath sounds. No wheezing, rhonchi or rales.   Abdominal:      General: Abdomen is flat. Bowel sounds are normal. There is no distension.      Palpations: Abdomen is soft.      Tenderness: There is no rebound.   Musculoskeletal:         General: No swelling or tenderness.   Lymphadenopathy:      Cervical: No cervical adenopathy.   Skin:     Coloration: Skin is not jaundiced.      Findings: No  erythema.   Neurological:      General: No focal deficit present.      Mental Status: He is alert and oriented to person, place, and time. Mental status is at baseline.      Cranial Nerves: No cranial nerve deficit.   Psychiatric:         Mood and Affect: Mood normal.         Behavior: Behavior normal.         Fluids    Intake/Output Summary (Last 24 hours) at 7/2/2020 1135  Last data filed at 7/2/2020 0000  Gross per 24 hour   Intake --   Output 1150 ml   Net -1150 ml       Laboratory  Recent Labs     07/01/20  0703 07/02/20  0343   WBC 4.9 5.3   RBC 5.05 5.15   HEMOGLOBIN 13.3* 13.5*   HEMATOCRIT 43.6 44.5   MCV 86.3 86.4   MCH 26.3* 26.2*   MCHC 30.5* 30.3*   RDW 54.1* 53.8*   PLATELETCT 190 144*   MPV 13.0*  --      Recent Labs     07/01/20  0703 07/02/20  0343   SODIUM 138 131*   POTASSIUM 3.7 4.3   CHLORIDE 102 99   CO2 26 21   GLUCOSE 141* 390*   BUN 16 26*   CREATININE 0.98 0.95   CALCIUM 9.2 8.9                   Imaging  EC-ECHOCARDIOGRAM COMPLETE W/O CONT   Final Result      DX-CHEST-PORTABLE (1 VIEW)   Final Result      Stable cardiac silhouette enlargement without consolidation identified           Assessment/Plan  * Lupus (HCC)- (present on admission)  Assessment & Plan  Exacerbation symptoms started prior to biopsy, worsened with initiation of prophylactic doxycycline  Dr Horna is his rheumatologist  Continue with Solumedrol 125mg every 6 hours and transition to prednisone upon discharge      Paroxysmal atrial fibrillation (HCC)- (present on admission)  Assessment & Plan  Rate controlled  Continue eliquis      Hyperglycemia  Assessment & Plan  Secondary to steroids      Elevated brain natriuretic peptide (BNP) level  Assessment & Plan  Suspect is related to lupus flare  Check 2D echo given history of ischemic heart disease.      Generalized pain  Assessment & Plan  Should respond to steroids with treatment of his lupus flare      Medication intolerance  Assessment & Plan  Lip swelling, oral  ulcers  Worsened symptoms of lupus exacerbation      Lip swelling  Assessment & Plan  Should respond to steroids  No issue with swallowing or airway preservation      Polycythemia vera (HCC)- (present on admission)  Assessment & Plan  Follows with Dr Sims  Counts have been stable      Agent orange exposure- (present on admission)  Assessment & Plan  .           VTE prophylaxis: scds

## 2020-07-02 NOTE — PROGRESS NOTES
Telemetry Shift Summary    Rhythm SR  HR Range 60s-80s  Ectopy Rare PVC/PAC  Measurements 0.20/0.12/0.44        Normal Values  Rhythm SR  HR Range    Measurements 0.12-0.20 / 0.06-0.10  / 0.30-0.52

## 2020-07-02 NOTE — DIETARY
"Nutrition services: Day 0 of admit.  Francesco Schulte is a 69 y.o. male with admitting DX of chest pain and near syncope. DX includes Lupus, A-fib, polycythemia vera, hyperglycemia due to steroids. Pt also has mouth pain due to sores that were caused by intolerance to a prescribed med.     Consult received for MST score of 5 on nutrition screen due to report of 65 lb wt loss x 3 months and poor PO.       Assessment:  Height: 190.5 cm (6' 3\")  Weight: 91 kg (200 lb 9.9 oz)  Body mass index is 25.08 kg/m²., BMI classification: overweight  Diet/Intake: cardiac/<25% of breakfast today.    Evaluation:   1. Pt reports significant 65 lb (25%) weight loss x 1 1/2 months due to prescribed Plaquenil for his Lupus. Since then he has gained 5 lb back. Slight hollowness in the orbital and temple region noted indicating fat and muscle loss. He said that his PO intake did not change while he was losing weight. He said that his doctor encouraged him to eat whatever he wants to encourage wt gain. He was told to not worry about carbs. He also said that he does not restrict sodium because he has low blood pressures at times. Pt said that he is limiting his protein, especially red meat, because he has elevated iron stores due to his polycythemia vera. He has a doctor's appointment for this on Monday. Pt was agreeable to adding Boost Glucose Control to his meals TID for additional kcals and protein. He requested large portions of carbs on his tray but pt's glucose levels are very high due to his prescribed steroids. Current meal portions with addition of supplement should be adequate to encourage wt gain.   2. Pt's PO intake was poor at breakfast due to mouth pain. He said that he is now receiving something to help with his mouth pain at meal times and he was able to eat more at his next meal.      3. Labs: 7/2/20: sodium=131 (L), glucose=390 (H). Glucose accuchecks: 413, 365, 330, 306. 1/7/20: Glycohemoglobin=8.6  4. Meds: folic " acid, Lantus, SSI,Solumedrol    Malnutrition Risk: Severe malnutrition in the context of acute circumstances related to wt loss caused by medication Plaquenil AEB report of 25% wt loss x 1 1/2 months and presence of moderate fat loss in zygomatic region and moderate muscle loss in temple region.     Recommendations/Plan:  1. Add Boost Glucose Control to meals TID.   2. Encourage intake of >50%  3. Document intake of all meals and supplements as % taken in ADL's to provide interdisciplinary communication across all shifts.   4. Monitor weight.  5. Nutrition rep will continue to see patient for ongoing meal and snack preferences.     RD will monitor.

## 2020-07-02 NOTE — PROGRESS NOTES
Report given to Sena JORGENSEN. POC discussed. Pt resting comfortably in bed. Safety precautions in place.

## 2020-07-02 NOTE — CARE PLAN
Problem: Safety  Goal: Will remain free from injury  Outcome: PROGRESSING AS EXPECTED  Note: Non-skid socks in use. Call light in reach. Pt instructed to call for help. Hourly rounding complete. Bed alarm on. Bed locked and in low position. Pt verbalized understanding of safety care plan.       Problem: Infection  Goal: Will remain free from infection  Outcome: PROGRESSING AS EXPECTED  Note: Pt educated on handwashing and hygiene. Standard precautions implemented. Assessed for s/s of infections. VS and labs monitored. Pt verbalized understanding of infection prevention care plan.

## 2020-07-03 VITALS
HEIGHT: 75 IN | OXYGEN SATURATION: 97 % | HEART RATE: 73 BPM | DIASTOLIC BLOOD PRESSURE: 65 MMHG | SYSTOLIC BLOOD PRESSURE: 143 MMHG | WEIGHT: 200.62 LBS | RESPIRATION RATE: 16 BRPM | TEMPERATURE: 98.1 F | BODY MASS INDEX: 24.94 KG/M2

## 2020-07-03 LAB
GLUCOSE BLD-MCNC: 254 MG/DL (ref 65–99)
MAGNESIUM SERPL-MCNC: 2.2 MG/DL (ref 1.5–2.5)

## 2020-07-03 PROCEDURE — 83735 ASSAY OF MAGNESIUM: CPT

## 2020-07-03 PROCEDURE — 82962 GLUCOSE BLOOD TEST: CPT

## 2020-07-03 PROCEDURE — 700102 HCHG RX REV CODE 250 W/ 637 OVERRIDE(OP): Performed by: INTERNAL MEDICINE

## 2020-07-03 PROCEDURE — 700111 HCHG RX REV CODE 636 W/ 250 OVERRIDE (IP): Performed by: INTERNAL MEDICINE

## 2020-07-03 PROCEDURE — 99239 HOSP IP/OBS DSCHRG MGMT >30: CPT | Performed by: INTERNAL MEDICINE

## 2020-07-03 PROCEDURE — A9270 NON-COVERED ITEM OR SERVICE: HCPCS | Performed by: INTERNAL MEDICINE

## 2020-07-03 RX ORDER — PREDNISONE 20 MG/1
40 TABLET ORAL DAILY
Qty: 14 TAB | Refills: 0 | Status: SHIPPED | OUTPATIENT
Start: 2020-07-03 | End: 2020-07-10

## 2020-07-03 RX ORDER — OMEPRAZOLE 20 MG/1
20 CAPSULE, DELAYED RELEASE ORAL DAILY
Qty: 30 CAP | Refills: 1 | Status: SHIPPED | OUTPATIENT
Start: 2020-07-04 | End: 2020-09-30

## 2020-07-03 RX ADMIN — SENNOSIDES-DOCUSATE SODIUM TAB 8.6-50 MG 2 TABLET: 8.6-5 TAB at 06:08

## 2020-07-03 RX ADMIN — Medication: at 06:09

## 2020-07-03 RX ADMIN — APIXABAN 5 MG: 5 TABLET, FILM COATED ORAL at 06:08

## 2020-07-03 RX ADMIN — OMEPRAZOLE 20 MG: 20 CAPSULE, DELAYED RELEASE ORAL at 06:08

## 2020-07-03 RX ADMIN — ASPIRIN 81 MG: 81 TABLET, COATED ORAL at 06:09

## 2020-07-03 RX ADMIN — METHYLPREDNISOLONE SODIUM SUCCINATE 125 MG: 125 INJECTION, POWDER, FOR SOLUTION INTRAMUSCULAR; INTRAVENOUS at 06:08

## 2020-07-03 RX ADMIN — HYDROXYUREA 500 MG: 500 CAPSULE ORAL at 06:40

## 2020-07-03 RX ADMIN — INSULIN HUMAN 7 UNITS: 100 INJECTION, SOLUTION PARENTERAL at 06:06

## 2020-07-03 RX ADMIN — OXYCODONE HYDROCHLORIDE 10 MG: 10 TABLET ORAL at 03:00

## 2020-07-03 NOTE — PROGRESS NOTES
Report received from Nathaniel, offered pain medication at shift change and he wanted to wait.    Morning assessment done about 0850; he was asking when the doctor was going to round so he could go home.  His lips only have a trace to 1+ edema, he has 3 open sores in his bottom lip and the cracking is better.  Sutures intact on his thigh.  Pt able to tolerate breakfast with no swallowing difficulties.    Dr. Regan rounded and discharge orders were received.  Discharging Patient home per physician order.  Discharged with himself to home at 1205.  Demonstrated understanding of discharge instructions, follow up appointments, home medications, prescriptions sent to [facility], and nursing care instructions for anaphalyxis.  Ambulating without assistance, voiding without difficulty, pain well controlled, tolerating oral medications, oxygen saturation greater than 90% , tolerating diet.   Educational handouts given and discussed.  Verbalized understanding of discharge instructions and educational handouts.  All questions answered.  Belongings with patient at time of discharge. Pt was sent home with cane in hand.

## 2020-07-03 NOTE — DISCHARGE SUMMARY
"Discharge Summary    CHIEF COMPLAINT ON ADMISSION  Chief Complaint   Patient presents with   • Dizziness     present for past 2 days, c/i lighheaded, feeling like he is going to \"pass out\"   • Chest Pain     present for past two days       Reason for Admission  swollen lips, SOB, chest pain     Admission Date  7/1/2020    CODE STATUS  Full Code    HPI & HOSPITAL COURSE  This is a 69 y.o. male who presented 7/1/2020 with onset of what felt like increasing symptoms of lupus about 4 days prior.  He has been on methotrexate treatment with his rheumatologist, Dr Horan, but feels that it isn't helping much at this time and has an appointment with him on Monday, July 6th.  He had a biopsy and excision of a cyst days 2 prior to admission by his dermatologist and was placed on doxycycline empirically and from the first pill he didn't tolerate it well.  He threw up the first one and when he was able to keep the medication down, his lips began to swell and he started developing sores in his mouth.  He stopped taking the doxycyline and the lip swelling has improved but is not yet back to baseline.  He had some chest pressure, different from any of his previous heart attack and denies chest pain.  His presentation does not sound like cardiac in nature, more of a worsening exacerbation of his lupus worsened by intolerance to doxycycline.  He was placed on IV solumedrol with improvement in symptoms in 24 hours and is feeling markedly better.  He will complete prednisone taper starting at 40mg daily and will taper down based on recommendations from Dr Horan when he sees him at his appointment in 2 days.        Therefore, he is discharged in good and stable condition to home with close outpatient follow-up.    The patient met 2-midnight criteria for an inpatient stay at the time of discharge.    Discharge Date  7/3/2020    FOLLOW UP ITEMS POST DISCHARGE  Pcp, Rheumatology    DISCHARGE DIAGNOSES  Principal Problem:    Lupus (HCC) POA: " Yes  Active Problems:    Paroxysmal atrial fibrillation (HCC) POA: Yes    Agent orange exposure POA: Yes    Polycythemia vera (HCC) POA: Yes    Lip swelling POA: Unknown    Medication intolerance POA: Unknown    Generalized pain POA: Unknown    Elevated brain natriuretic peptide (BNP) level POA: Unknown    Hyperglycemia POA: Unknown  Resolved Problems:    * No resolved hospital problems. *      FOLLOW UP  Future Appointments   Date Time Provider Department Center   7/6/2020 11:30 AM  [facility]     Malissa Thomson M.D.  [address]  [address]  [place] [address]  [phone]    Schedule an appointment as soon as possible for a visit in 2 weeks      Shade Horan M.D.  [address] [facility] [address]  [address]  [place] [address]  [phone]    On 7/6/2020        MEDICATIONS ON DISCHARGE     Medication List      START taking these medications      Instructions   omeprazole 20 MG delayed-release capsule  Start taking on:  July 4, 2020  Commonly known as:  PRILOSEC   Take 1 Cap by mouth every day.  Dose:  20 mg     predniSONE 20 MG Tabs  Commonly known as:  DELTASONE   Take 2 Tabs by mouth every day for 7 days.  Dose:  40 mg        CONTINUE taking these medications      Instructions   apixaban 5mg Tabs  Commonly known as:  Eliquis   Take 1 Tab by mouth 2 Times a Day.  Dose:  5 mg     aspirin EC 81 MG Tbec  Commonly known as:  ECOTRIN   Take 1 Tab by mouth every day.  Dose:  81 mg     ciclopirox 0.77 % cream  Commonly known as:  LOPROX   Apply 1 Application to affected area(s) every day. AAA legs/buttocks for rash  Dose:  1 Application     finasteride 5 MG Tabs  Commonly known as:  PROSCAR   Take 5 mg by mouth every evening.  Dose:  5 mg     folic acid 1 MG Tabs  Commonly known as:  FOLVITE   Take 1 mg by mouth every day.  Dose:  1 mg     hydroxyurea 500 MG Caps  Commonly known as:  HYDREA   Take 500 mg by mouth 2 Times a Day.  Dose:  500 mg     methotrexate 2.5 MG Tabs   Take 5 mg by mouth See Admin  "Instructions. Pt reports that he takes 2 tablets on Thurs and then another 2 tablets on Friday, pt reports that it ok with his doctor.  Dose:  5 mg     naproxen 250 MG Tabs  Commonly known as:  NAPROSYN   Take 500-750 mg by mouth as needed. Indications: Pain  Dose:  500-750 mg     tamsulosin 0.4 MG capsule  Commonly known as:  FLOMAX   Take 0.8 mg by mouth every evening.  Dose:  0.8 mg     therapeutic multivitamin-minerals Tabs   Take 1 Tab by mouth every evening.  Dose:  1 Tab     Tresiba FlexTouch 200 UNIT/ML Sopn  Generic drug:  Insulin Degludec   Inject 18 Units as instructed every evening.  Dose:  18 Units            Allergies  Allergies   Allergen Reactions   • Beta Adrenergic Blockers Unspecified     dizziness   • Doxycycline      Swelling lips and difficulties swallowing   • Metformin Unspecified and Palpitations     Cardiac effects  \"Similar to a heart attack, I was hospitalized\"   • Simvastatin      Other reaction(s): Other (Specify with Comments)  Myalgias  Myalgias   • Statins [Hmg-Coa-R Inhibitors]      Muscle ache, joint pain       DIET  Orders Placed This Encounter   Procedures   • Diet Order Cardiac     Standing Status:   Standing     Number of Occurrences:   1     Order Specific Question:   Diet:     Answer:   Cardiac [6]       ACTIVITY  As tolerated.  Weight bearing as tolerated    CONSULTATIONS  none    PROCEDURES  none    LABORATORY  Lab Results   Component Value Date    SODIUM 131 (L) 07/02/2020    POTASSIUM 4.3 07/02/2020    CHLORIDE 99 07/02/2020    CO2 21 07/02/2020    GLUCOSE 390 (H) 07/02/2020    BUN 26 (H) 07/02/2020    CREATININE 0.95 07/02/2020        Lab Results   Component Value Date    WBC 5.3 07/02/2020    HEMOGLOBIN 13.5 (L) 07/02/2020    HEMATOCRIT 44.5 07/02/2020    PLATELETCT 144 (L) 07/02/2020        Total time of the discharge process exceeds 35 minutes.  "

## 2020-07-03 NOTE — PROGRESS NOTES
Telemetry Shift Summary    Rhythm SR, SB w/ BBB, 5 beats of Vtach @ 2303  HR Range 60s-80s low: 58  Ectopy O-FPVC, Rcoup, Rbigem, Rtrigem,   Measurements 0.20/0.12/0.46        Normal Values  Rhythm SR  HR Range    Measurements 0.12-0.20 / 0.06-0.10  / 0.30-0.52

## 2020-07-03 NOTE — CARE PLAN
Problem: Nutritional:  Goal: Achieve adequate nutritional intake  Description: Patient will consume >50% of meals and supplements.   Outcome: NOT MET

## 2020-07-03 NOTE — DISCHARGE INSTRUCTIONS
Discharge Instructions    Discharged to home by car with self. Discharged via walking, hospital escort: Yes.  Special equipment needed: Not Applicable    Be sure to schedule a follow-up appointment with your primary care doctor or any specialists as instructed.     Discharge Plan:        I understand that a diet low in cholesterol, fat, and sodium is recommended for good health. Unless I have been given specific instructions below for another diet, I accept this instruction as my diet prescription.   Other diet: keep yourself hydrated.     Special Instructions:     Anaphylactic Reaction, Adult  An anaphylactic reaction (anaphylaxis) is a sudden, severe allergic reaction by the body's disease-fighting system (immune system). Anaphylaxis can be life-threatening. This condition must be treated right away. Sometimes a person may need to be treated in the hospital.  What are the causes?  This condition is caused by exposure to a substance that you are allergic to (allergen). In response to this exposure, the body releases proteins (antibodies) and other compounds, such as histamine, into the bloodstream. This causes swelling in certain tissues and loss of blood pressure to important areas, such as the heart and lungs.  Common allergens that can cause anaphylaxis include:  · Foods, especially peanuts, wheat, shellfish, milk, and eggs.  · Medicines.  · Insect bites or stings.  · Blood or parts of blood received for treatment (transfusions).  · Chemicals, such as dyes, latex, and contrast material that is used for medical tests.  What increases the risk?  This condition is more likely to occur in people who:  · Have allergies.  · Have had anaphylaxis before.  · Have a family history of anaphylaxis.  · Have certain medical conditions, including asthma and eczema.  What are the signs or symptoms?  Symptoms of anaphylaxis may include:  · Feeling warm in the face (flushed). This may include redness.  · Itchy, red, swollen areas  "of skin (hives).  · Swelling of the eyes, lips, face, mouth, tongue, or throat.  · Difficulty breathing, speaking, or swallowing.  · Noisy breathing (wheezing).  · Dizziness or light-headedness.  · Fainting.  · Pain or cramping in the abdomen.  · Vomiting.  · Diarrhea.  How is this diagnosed?  This condition is diagnosed based on:  · Your symptoms.  · A physical exam.  · Blood tests.  · Recent history of exposure to allergens.  How is this treated?  If you think you are having an anaphylactic reaction, you should do the following right away:  · Give yourself an epinephrine injection using what is commonly called an auto-injector \"pen\" (pre-filled automatic epinephrine injection device). Your health care provider will teach you how to use an auto-injector pen.  · Call for emergency help. If you use a pen, you must still get emergency medical treatment in the hospital. Treatment in the hospital may include:  ? Medicines to help:  § Tighten your blood vessels (epinephrine).  § Relieve itching and hives (antihistamines).  § Reduce swelling (corticosteroids).  ? Oxygen therapy to help you breathe.  ? IV fluids to keep you hydrated.  Follow these instructions at home:  Safety  · Always keep an auto-injector pen near you. This can be lifesaving if you have a severe anaphylactic reaction. Use your auto-injector pen as told by your health care provider.  · Do not drive after an anaphylactic reaction until your health care provider approves.  · Make sure that you, the members of your household, and your employer know:  ? What you are allergic to, so it can be avoided.  ? How to use an auto-injector pen to give you an epinephrine injection.  · Replace your epinephrine immediately after you use your auto-injector pen. This is important if you have another reaction.  · If told by your health care provider, wear a medical alert bracelet or necklace that states your allergy.  · Learn the signs of anaphylaxis so that you can " recognize and treat it right away.  · Work with your health care providers to make an anaphylaxis plan. Preparation is important.  General instructions  · If you have hives or rash:  ? Use an over-the-counter antihistamine as told by your health care provider.  ? Apply cold, wet cloths (cold compresses) to your skin or take baths or showers in cool water. Avoid hot water.  · Take over-the-counter and prescription medicines only as told by your health care provider.  · Tell all your health care providers that you have an allergy.  · Keep all follow-up visits as told by your health care provider. This is important.  How is this prevented?  · Avoid allergens that have caused an anaphylactic reaction in the past.  · When you are at a restaurant, tell your  that you have an allergy. If you are not sure whether a menu item contains an ingredient that you are allergic to, ask your .  Where to find more information  · American Academy of Allergy, Asthma and Immunology: aaaai.org  · American Academy of Pediatrics: healthychildren.org  Get help right away if:  · You develop symptoms of an allergic reaction. You may notice them soon after you are exposed to a substance. Symptoms may include:  ? Flushed skin.  ? Hives.  ? Swelling of the eyes, lips, face, mouth, tongue, or throat.  ? Difficulty breathing, speaking, or swallowing.  ? Wheezing.  ? Dizziness or light-headedness.  ? Fainting.  ? Pain or cramping in the abdomen.  ? Vomiting.  ? Diarrhea.  · You used epinephrine. You need more medical care even if the medicine seems to be working. This is important because anaphylaxis may happen again within 72 hours (rebound anaphylaxis). You may need more doses of epinephrine.  These symptoms may represent a serious problem that is an emergency. Do not wait to see if the symptoms will go away. Do the following right away:  · Use the auto-injector pen as you have been instructed.  · Get medical help. Call your local  "emergency services (911 in the [place] Do not drive yourself to the hospital.  Summary  · An anaphylactic reaction (anaphylaxis) is a sudden, severe allergic reaction by the body's disease-fighting system (immune system).  · This condition can be life-threatening. If you have an anaphylactic reaction, get medical help right away.  · Your health care provider may teach you how to use an auto-injector \"pen\" (pre-filled automatic epinephrine injection device) to give yourself a shot.  · Always keep an auto-injector pen with you. This could save your life. Use it as told by your health care provider.  · If you use epinephrine, you must still get emergency medical treatment, even if the medicine seems to be working.  This information is not intended to replace advice given to you by your health care provider. Make sure you discuss any questions you have with your health care provider.  Document Released: 12/18/2006 Document Revised: 04/11/2019 Document Reviewed: 04/11/2019  [employer] Patient Education © 2020 [employer]      · Is patient discharged on Warfarin / Coumadin?   No     Depression / Suicide Risk    As you are discharged from this [facility] facility, it is important to learn how to keep safe from harming yourself.    Recognize the warning signs:  · Abrupt changes in personality, positive or negative- including increase in energy   · Giving away possessions  · Change in eating patterns- significant weight changes-  positive or negative  · Change in sleeping patterns- unable to sleep or sleeping all the time   · Unwillingness or inability to communicate  · Depression  · Unusual sadness, discouragement and loneliness  · Talk of wanting to die  · Neglect of personal appearance   · Rebelliousness- reckless behavior  · Withdrawal from people/activities they love  · Confusion- inability to concentrate     If you or a loved one observes any of these behaviors or has concerns about self-harm, here's what you can " do:  · Talk about it- your feelings and reasons for harming yourself  · Remove any means that you might use to hurt yourself (examples: pills, rope, extension cords, firearm)  · Get professional help from the community (Mental Health, Substance Abuse, psychological counseling)  · Do not be alone:Call your Safe Contact- someone whom you trust who will be there for you.  · Call your local CRISIS HOTLINE 870-9133 or 931-899-5774  · Call your local Children's Mobile Crisis Response Team [place] (646) 546-6133 or www.[employer]  · Call the toll free National Suicide Prevention Hotlines   · National Suicide Prevention Lifeline 038-926-GZTC (8667)  · National Hope Line Network 800-SUICIDE (411-6578)

## 2020-07-06 ENCOUNTER — NON-PROVIDER VISIT (OUTPATIENT)
Dept: DERMATOLOGY | Facility: IMAGING CENTER | Age: 70
End: 2020-07-06
Payer: MEDICARE

## 2020-07-06 NOTE — PROGRESS NOTES
Bob Schulte is a 69 y.o. male here for a Non-Provider Visit for Suture Removal.    Sutures were placed by  Vanessa GOLDEN on date: 06/22/2020  Skin is healed: Yes  Provider notified if skin is not healed, or if there is redness, heat, pain, or drainage from incision: yes  Sutures removed.   Mastisol and steristips are placed: No    Advised to use emollient (vaseline, aquaphor, etc.) as needed, avoid peroxide and antibiotic ointment to reduce irritation.     Path report has been reviewed by provider.  Path report has reviewed with patient.

## 2020-07-13 ENCOUNTER — OFFICE VISIT (OUTPATIENT)
Dept: DERMATOLOGY | Facility: IMAGING CENTER | Age: 70
End: 2020-07-13
Payer: MEDICARE

## 2020-07-13 DIAGNOSIS — B36.9 FUNGAL SKIN INFECTION: ICD-10-CM

## 2020-07-13 PROCEDURE — 99213 OFFICE O/P EST LOW 20 MIN: CPT | Performed by: DERMATOLOGY

## 2020-07-13 RX ORDER — TERBINAFINE HYDROCHLORIDE 250 MG/1
250 TABLET ORAL DAILY
Qty: 30 TAB | Refills: 1 | Status: SHIPPED | OUTPATIENT
Start: 2020-07-13 | End: 2020-07-25

## 2020-07-13 NOTE — PROGRESS NOTES
"CC:  Follow up rash /lupus     Subjective: Previously seen patient here for skin rash on leg and buttocks.      In interim - had biopsy/possibly infected.  Treated with doxycycline with concern for drug reaction for which he was hospitalized (could have been lupus flare?).  Received IV steroids then discharged.     Reports use of topical lamisil on affected skin sites for several weeks with no improvement.  Sites \"blew up\" after use.  Had also been treating with topical neosporin.  Desires alt treatment.     From prior notes:  \"Reports complex history a little difficult to follow:  May have had a spider bite on left thigh/leg weeks ago, started small and then became bigger.  Aggravated by betamethasone topical but improved some with ciclopirox cream.  Redness, soreness now improved.  Several weeks later had noted another area on left leg with sore red bumps, picked/squeezed at site.  Had some residual prednisone at home which he took - helped redness.  Now site almost completely resolved.  In last 1-3 weeks, has developed red spots, mildly sore on buttocks.  He squeezes these regularly to express contents, which helps.  Has been hard to sit down, can be very tender.  Desires refill of ciclopirox to treat.     Reports past hx of agent orange exposure, hx of lupus found about 1 year ago.  Had been managed on/off with different medications.      Denies known liver problems.\"      ROS: no fevers/chills. +/- itch.  No cough.  Weight loss of 60 pounds over 6 months, attributing to lupus, per patient.  DermPMH: no skin cancer/melanoma  No problem-specific Assessment & Plan notes found for this encounter.    Relevant PMH: hx [facility].  Had been on/off prednisone in past.  Hx of MTX use.  Hx of 'blood cancer' managed by [facility], polycythemia/thrombocytopenia in records  - hydroxyurea.  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    PE: Gen:WDWN male in NAD.  Skin: Face - no notable rashes, on exposed skin of face.  Hands/arms without rashes noted.  Few purpuric patches. Patch of erythema with few nodules and overlying scaling on anterior thigh.  Buttocks - erythematous concentric annular rash on medial buttocks.  Little overlying scale.       Labs: June 2020  WBC  4.8 - 10.8 K/uL  9.2   4.8   7.2   4.4Low       RBC  4.70 - 6.10 M/uL  5.24   4.94   4.97   5.63     Hemoglobin  14.0 - 18.0 g/dL  14.5   12.9Low     13.1Low     12.3Low       Hematocrit  42.0 - 52.0 %  47.5   43.0   44.0   42.9     MCV  81.4 - 97.8 fL  90.6   87.0   88.5   76.2Low       MCH  27.0 - 33.0 pg  27.7   26.1Low     26.4Low     21.8Low       MCHC  33.7 - 35.3 g/dL  30.5Low     30.0Low     29.8Low     28.7Low       RDW  35.9 - 50.0 fL  57.4High     58.5High     67.8High     56.3High       Platelet Count  164 - 446 K/uL  304   212   412   303     MPV  9.0 - 12.9 fL  12.4   11.2   11.6      Neutrophils-Polys  44.00 - 72.00 %  87.20High     79.50High     94.70High     70.30     Lymphocytes  22.00 - 41.00 %  5.00Low     7.10Low     4.40Low     14.60Low       Monocytes  0.00 - 13.40 %  5.50   9.00   0.00   11.70     Eosinophils  0.00 - 6.90 %  0.10   2.30   0.00   1.60     Basophils  0.00 - 1.80 %  0.80   1.50   0.90   0.90     Immature Granulocytes  0.00 - 0.90 %  1.40High     0.60    0.90     Nucleated RBC  /100 WBC  0.00   0.00   0.00   0.00     Neutrophils (Absolute)  1.82 - 7.42 K/uL  7.99High     3.79 CM   6.82 CM   3.07 CM     Comment: Includes immature neutrophils, if present.    Lymphs (Absolute)  1.00 - 4.80 K/uL  0.46Low     0.34Low     0.32Low     0.64Low       Monos (Absolute)  0.00 - 0.85 K/uL  0.50   0.43   0.00   0.51     Eos (Absolute)  0.00 - 0.51 K/uL  0.01   0.11   0.00   0.07     Baso (Absolute)  0.00 - 0.12 K/uL  0.07   0.07   0.06   0.04     Immature " "Granulocytes (abs)  0.00 - 0.11 K/uL  0.13High     0.03    0.04     NRBC (Absolute)  K/uL  0.00   0.00   0.00   0.00      Sodium  135 - 145 mmol/L  137   139   137   140     Potassium  3.6 - 5.5 mmol/L  4.6   4.1   3.7   4.2     Chloride  96 - 112 mmol/L  99   104   102   107     Co2  20 - 33 mmol/L  26   27   27   20     Anion Gap  7.0 - 16.0  12.0   8.0 R   8.0 R   13.0High   R     Glucose  65 - 99 mg/dL  337High     135High     216High     105High       Bun  8 - 22 mg/dL  18   13   20   18     Creatinine  0.50 - 1.40 mg/dL  1.10   1.01   0.92   0.97     Calcium  8.4 - 10.2 mg/dL  9.8   9.5 R   9.2 R   9.4     AST(SGOT)  12 - 45 U/L  18   22   14   39 CM     ALT(SGPT)  2 - 50 U/L  19   18   15   17     Alkaline Phosphatase  30 - 99 U/L  67   52   48   47     Total Bilirubin  0.1 - 1.5 mg/dL  0.8   0.5   0.8   0.6     Albumin  3.2 - 4.9 g/dL  4.4   4.2   4.0   3.9     Total Protein  6.0 - 8.2 g/dL  7.5   7.1   6.7   7.6     Globulin  1.9 - 3.5 g/dL  3.1   2.9   2.7   3.7High       A-G Ratio  g/dL            C3 Complement  87.0 - 200.0 mg/dL  47.0Low     77.0Low     62.0Low       Complement C4  19.0 - 52.0 mg/dL  2.4Low     <8.0Low     <8.0Low      Total Protein, Urine  0.0 - 15.0 mg/dL  27.0High     13.6   11.0     Creatinine, Random Urine  mg/dL  97.44   108.80 CM   107.40 CM     Comment: Reference ranges have not been established for this specimen type.   Result interpretation should include consideration of patient's   medical condition and clinical presentation.    Protein Creatinine Ratio  15 - 68 mg/g  277High     125High     102High      Sed Rate Westergren  0 - 20 mm/hour  1      Stat C-Reactive Protein  0.00 - 0.75 mg/dL  0.15      Path = + fungal elements    A/P: \"rash\", consider possible folliculitis, fungus component.  Possible also tinea, partially treated, with patient report of improvement with ciclopirox cream.  Consider occhi's.  Path positive for fungal elements.  -starting oral Lamisil 250mg " PO Qday X 4 weeks, consider 6-8 weeks, se reviewed.  Cannot assure there will be no drug reaction.  -f/u 5-6 weeks

## 2020-07-14 NOTE — DOCUMENTATION QUERY
[facility]                                                                       Query Response Note      PATIENT:               MARZENA ROMEO  ACCT #:                  [id number]  MRN:                     [id number]  :                      1950  ADMIT DATE:       2020 6:51 AM  DISCH DATE:        7/3/2020 12:06 PM  RESPONDING  PROVIDER #:        [id number]           QUERY TEXT:    The patient carries a diagnosis of lupus.     With the implementation of ICD-10 CM, there is no longer a default code for lupus to systemic lupus erythematosus.     Coding Clinic advises the  to query the physician for lupus specificity.     Based on clinical findings, risk factors and treatment, can lupus be further clarified as:    NOTE:  If an appropriate response is not listed below, please respond with a new note.      The patient's Clinical Indicators include:    ED Report, History and Physical, Progress Notes, Discharge Summary  Lupus with exacerbation  Options provided:   -- Lupus anticoagulant (LAC)   -- Lupus discoid (erythematosus) (local)   -- Lupus exedens   -- Lupus hydralazine   -- Lupus nontuberculous, not disseminated   -- Lupus pernio (Besnier)   -- Lupus systemic (SLE)   -- Lupus Tuberculous   -- Lupus vulgaris   -- Unable to determine      Query created by: Ivonne Barker on 2020 10:04 AM    RESPONSE TEXT:    Lupus systemic (SLE)          Electronically signed by:  SHAYNE GUZMAN DO 2020 2:36 PM

## 2020-07-25 ENCOUNTER — HOSPITAL ENCOUNTER (INPATIENT)
Facility: MEDICAL CENTER | Age: 70
LOS: 28 days | DRG: 854 | End: 2020-08-22
Attending: EMERGENCY MEDICINE | Admitting: INTERNAL MEDICINE
Payer: MEDICARE

## 2020-07-25 ENCOUNTER — ANESTHESIA EVENT (OUTPATIENT)
Dept: SURGERY | Facility: MEDICAL CENTER | Age: 70
DRG: 854 | End: 2020-07-25
Payer: MEDICARE

## 2020-07-25 ENCOUNTER — ANESTHESIA (OUTPATIENT)
Dept: SURGERY | Facility: MEDICAL CENTER | Age: 70
DRG: 854 | End: 2020-07-25
Payer: MEDICARE

## 2020-07-25 ENCOUNTER — APPOINTMENT (OUTPATIENT)
Dept: RADIOLOGY | Facility: MEDICAL CENTER | Age: 70
DRG: 854 | End: 2020-07-25
Attending: EMERGENCY MEDICINE
Payer: MEDICARE

## 2020-07-25 DIAGNOSIS — R54 AGE-RELATED PHYSICAL DEBILITY: ICD-10-CM

## 2020-07-25 DIAGNOSIS — R42 DIZZINESS: ICD-10-CM

## 2020-07-25 DIAGNOSIS — G89.4 CHRONIC PAIN SYNDROME: ICD-10-CM

## 2020-07-25 DIAGNOSIS — N49.3 FOURNIER'S GANGRENE OF SCROTUM: ICD-10-CM

## 2020-07-25 DIAGNOSIS — R53.81 DEBILITY: ICD-10-CM

## 2020-07-25 DIAGNOSIS — N49.3 FOURNIER'S GANGRENE IN MALE: ICD-10-CM

## 2020-07-25 DIAGNOSIS — Z91.81 RISK FOR FALLS: ICD-10-CM

## 2020-07-25 PROBLEM — K86.2 PANCREATIC CYST: Status: ACTIVE | Noted: 2020-07-25

## 2020-07-25 PROBLEM — Z79.4 TYPE 2 DIABETES MELLITUS, WITH LONG-TERM CURRENT USE OF INSULIN (HCC): Status: ACTIVE | Noted: 2017-03-13

## 2020-07-25 PROBLEM — M32.9 SLE (SYSTEMIC LUPUS ERYTHEMATOSUS RELATED SYNDROME) (HCC): Status: ACTIVE | Noted: 2020-07-25

## 2020-07-25 LAB
ABO + RH BLD: NORMAL
ABO GROUP BLD: NORMAL
ALBUMIN SERPL BCP-MCNC: 2.5 G/DL (ref 3.2–4.9)
ALBUMIN/GLOB SERPL: 0.7 G/DL
ALP SERPL-CCNC: 138 U/L (ref 30–99)
ALT SERPL-CCNC: 7 U/L (ref 2–50)
ANION GAP SERPL CALC-SCNC: 13 MMOL/L (ref 7–16)
APPEARANCE UR: CLEAR
AST SERPL-CCNC: 10 U/L (ref 12–45)
BARCODED ABORH UBTYP: 5100
BARCODED ABORH UBTYP: 5100
BARCODED PRD CODE UBPRD: NORMAL
BARCODED PRD CODE UBPRD: NORMAL
BARCODED UNIT NUM UBUNT: NORMAL
BARCODED UNIT NUM UBUNT: NORMAL
BASOPHILS # BLD AUTO: 0.4 % (ref 0–1.8)
BASOPHILS # BLD: 0.06 K/UL (ref 0–0.12)
BILIRUB SERPL-MCNC: 0.8 MG/DL (ref 0.1–1.5)
BILIRUB UR QL STRIP.AUTO: NEGATIVE
BLD GP AB SCN SERPL QL: NORMAL
BUN SERPL-MCNC: 28 MG/DL (ref 8–22)
CALCIUM SERPL-MCNC: 8.5 MG/DL (ref 8.4–10.2)
CHLORIDE SERPL-SCNC: 96 MMOL/L (ref 96–112)
CO2 SERPL-SCNC: 23 MMOL/L (ref 20–33)
COLOR UR: YELLOW
COMPONENT FT 8504FT: NORMAL
COMPONENT FT 8504FT: NORMAL
COVID ORDER STATUS COVID19: NORMAL
CREAT SERPL-MCNC: 0.89 MG/DL (ref 0.5–1.4)
EKG IMPRESSION: NORMAL
EOSINOPHIL # BLD AUTO: 0.03 K/UL (ref 0–0.51)
EOSINOPHIL NFR BLD: 0.2 % (ref 0–6.9)
ERYTHROCYTE [DISTWIDTH] IN BLOOD BY AUTOMATED COUNT: 55 FL (ref 35.9–50)
GLOBULIN SER CALC-MCNC: 3.6 G/DL (ref 1.9–3.5)
GLUCOSE BLD-MCNC: 234 MG/DL (ref 65–99)
GLUCOSE BLD-MCNC: 296 MG/DL (ref 65–99)
GLUCOSE BLD-MCNC: 487 MG/DL (ref 65–99)
GLUCOSE SERPL-MCNC: 479 MG/DL (ref 65–99)
GLUCOSE UR STRIP.AUTO-MCNC: 500 MG/DL
HCT VFR BLD AUTO: 43.2 % (ref 42–52)
HGB BLD-MCNC: 13.3 G/DL (ref 14–18)
IMM GRANULOCYTES # BLD AUTO: 0.23 K/UL (ref 0–0.11)
IMM GRANULOCYTES NFR BLD AUTO: 1.6 % (ref 0–0.9)
INR PPP: 1.59 (ref 0.87–1.13)
KETONES UR STRIP.AUTO-MCNC: ABNORMAL MG/DL
LACTATE BLD-SCNC: 2.1 MMOL/L (ref 0.5–2)
LACTATE BLD-SCNC: 2.5 MMOL/L (ref 0.5–2)
LACTATE BLD-SCNC: 2.9 MMOL/L (ref 0.5–2)
LEUKOCYTE ESTERASE UR QL STRIP.AUTO: NEGATIVE
LYMPHOCYTES # BLD AUTO: 0.21 K/UL (ref 1–4.8)
LYMPHOCYTES NFR BLD: 1.5 % (ref 22–41)
MCH RBC QN AUTO: 25.4 PG (ref 27–33)
MCHC RBC AUTO-ENTMCNC: 30.8 G/DL (ref 33.7–35.3)
MCV RBC AUTO: 82.6 FL (ref 81.4–97.8)
MICRO URNS: ABNORMAL
MONOCYTES # BLD AUTO: 0.39 K/UL (ref 0–0.85)
MONOCYTES NFR BLD AUTO: 2.7 % (ref 0–13.4)
NEUTROPHILS # BLD AUTO: 13.44 K/UL (ref 1.82–7.42)
NEUTROPHILS NFR BLD: 93.6 % (ref 44–72)
NITRITE UR QL STRIP.AUTO: NEGATIVE
NRBC # BLD AUTO: 0 K/UL
NRBC BLD-RTO: 0 /100 WBC
PH UR STRIP.AUTO: 5.5 [PH] (ref 5–8)
PLATELET # BLD AUTO: 255 K/UL (ref 164–446)
POTASSIUM SERPL-SCNC: 3.7 MMOL/L (ref 3.6–5.5)
PRODUCT TYPE UPROD: NORMAL
PRODUCT TYPE UPROD: NORMAL
PROT SERPL-MCNC: 6.1 G/DL (ref 6–8.2)
PROT UR QL STRIP: NEGATIVE MG/DL
PROTHROMBIN TIME: 19.3 SEC (ref 12–14.6)
RBC # BLD AUTO: 5.23 M/UL (ref 4.7–6.1)
RBC UR QL AUTO: NEGATIVE
RH BLD: NORMAL
SARS-COV-2 RNA RESP QL NAA+PROBE: NOTDETECTED
SODIUM SERPL-SCNC: 132 MMOL/L (ref 135–145)
SP GR UR STRIP.AUTO: 1.01
SPECIMEN SOURCE: NORMAL
TROPONIN T SERPL-MCNC: 24 NG/L (ref 6–19)
UNIT STATUS USTAT: NORMAL
UNIT STATUS USTAT: NORMAL
WBC # BLD AUTO: 14.4 K/UL (ref 4.8–10.8)

## 2020-07-25 PROCEDURE — P9017 PLASMA 1 DONOR FRZ W/IN 8 HR: HCPCS

## 2020-07-25 PROCEDURE — C9803 HOPD COVID-19 SPEC COLLECT: HCPCS | Performed by: UROLOGY

## 2020-07-25 PROCEDURE — 700111 HCHG RX REV CODE 636 W/ 250 OVERRIDE (IP): Performed by: ANESTHESIOLOGY

## 2020-07-25 PROCEDURE — 160027 HCHG SURGERY MINUTES - 1ST 30 MINS LEVEL 2: Performed by: UROLOGY

## 2020-07-25 PROCEDURE — 700105 HCHG RX REV CODE 258: Performed by: ANESTHESIOLOGY

## 2020-07-25 PROCEDURE — 85610 PROTHROMBIN TIME: CPT

## 2020-07-25 PROCEDURE — 85025 COMPLETE CBC W/AUTO DIFF WBC: CPT

## 2020-07-25 PROCEDURE — U0003 INFECTIOUS AGENT DETECTION BY NUCLEIC ACID (DNA OR RNA); SEVERE ACUTE RESPIRATORY SYNDROME CORONAVIRUS 2 (SARS-COV-2) (CORONAVIRUS DISEASE [COVID-19]), AMPLIFIED PROBE TECHNIQUE, MAKING USE OF HIGH THROUGHPUT TECHNOLOGIES AS DESCRIBED BY CMS-2020-01-R: HCPCS

## 2020-07-25 PROCEDURE — 160035 HCHG PACU - 1ST 60 MINS PHASE I: Performed by: UROLOGY

## 2020-07-25 PROCEDURE — 87205 SMEAR GRAM STAIN: CPT

## 2020-07-25 PROCEDURE — 700111 HCHG RX REV CODE 636 W/ 250 OVERRIDE (IP): Performed by: HOSPITALIST

## 2020-07-25 PROCEDURE — 160009 HCHG ANES TIME/MIN: Performed by: UROLOGY

## 2020-07-25 PROCEDURE — 87102 FUNGUS ISOLATION CULTURE: CPT

## 2020-07-25 PROCEDURE — A6266 IMPREG GAUZE NO H20/SAL/YARD: HCPCS | Performed by: UROLOGY

## 2020-07-25 PROCEDURE — 86900 BLOOD TYPING SEROLOGIC ABO: CPT

## 2020-07-25 PROCEDURE — 700111 HCHG RX REV CODE 636 W/ 250 OVERRIDE (IP): Performed by: INTERNAL MEDICINE

## 2020-07-25 PROCEDURE — 74177 CT ABD & PELVIS W/CONTRAST: CPT

## 2020-07-25 PROCEDURE — 99291 CRITICAL CARE FIRST HOUR: CPT

## 2020-07-25 PROCEDURE — 500380 HCHG DRAIN, PENROSE 1/4X12: Performed by: UROLOGY

## 2020-07-25 PROCEDURE — 700102 HCHG RX REV CODE 250 W/ 637 OVERRIDE(OP): Performed by: INTERNAL MEDICINE

## 2020-07-25 PROCEDURE — 0JBB0ZZ EXCISION OF PERINEUM SUBCUTANEOUS TISSUE AND FASCIA, OPEN APPROACH: ICD-10-PCS | Performed by: UROLOGY

## 2020-07-25 PROCEDURE — 86901 BLOOD TYPING SEROLOGIC RH(D): CPT

## 2020-07-25 PROCEDURE — 700101 HCHG RX REV CODE 250: Performed by: EMERGENCY MEDICINE

## 2020-07-25 PROCEDURE — 36430 TRANSFUSION BLD/BLD COMPNT: CPT

## 2020-07-25 PROCEDURE — 81003 URINALYSIS AUTO W/O SCOPE: CPT

## 2020-07-25 PROCEDURE — 99223 1ST HOSP IP/OBS HIGH 75: CPT | Performed by: INTERNAL MEDICINE

## 2020-07-25 PROCEDURE — 770022 HCHG ROOM/CARE - ICU (200)

## 2020-07-25 PROCEDURE — 700101 HCHG RX REV CODE 250: Performed by: INTERNAL MEDICINE

## 2020-07-25 PROCEDURE — 700101 HCHG RX REV CODE 250: Performed by: ANESTHESIOLOGY

## 2020-07-25 PROCEDURE — 30233K1 TRANSFUSION OF NONAUTOLOGOUS FROZEN PLASMA INTO PERIPHERAL VEIN, PERCUTANEOUS APPROACH: ICD-10-PCS | Performed by: EMERGENCY MEDICINE

## 2020-07-25 PROCEDURE — 96375 TX/PRO/DX INJ NEW DRUG ADDON: CPT

## 2020-07-25 PROCEDURE — 501838 HCHG SUTURE GENERAL: Performed by: UROLOGY

## 2020-07-25 PROCEDURE — 700105 HCHG RX REV CODE 258: Performed by: EMERGENCY MEDICINE

## 2020-07-25 PROCEDURE — 96365 THER/PROPH/DIAG IV INF INIT: CPT

## 2020-07-25 PROCEDURE — 700111 HCHG RX REV CODE 636 W/ 250 OVERRIDE (IP): Performed by: EMERGENCY MEDICINE

## 2020-07-25 PROCEDURE — 87181 SC STD AGAR DILUTION PER AGT: CPT

## 2020-07-25 PROCEDURE — 160048 HCHG OR STATISTICAL LEVEL 1-5: Performed by: UROLOGY

## 2020-07-25 PROCEDURE — 87077 CULTURE AEROBIC IDENTIFY: CPT | Mod: 91

## 2020-07-25 PROCEDURE — 96367 TX/PROPH/DG ADDL SEQ IV INF: CPT

## 2020-07-25 PROCEDURE — 84484 ASSAY OF TROPONIN QUANT: CPT

## 2020-07-25 PROCEDURE — 700111 HCHG RX REV CODE 636 W/ 250 OVERRIDE (IP): Performed by: UROLOGY

## 2020-07-25 PROCEDURE — 86850 RBC ANTIBODY SCREEN: CPT

## 2020-07-25 PROCEDURE — C9803 HOPD COVID-19 SPEC COLLECT: HCPCS | Performed by: EMERGENCY MEDICINE

## 2020-07-25 PROCEDURE — 87070 CULTURE OTHR SPECIMN AEROBIC: CPT

## 2020-07-25 PROCEDURE — 36415 COLL VENOUS BLD VENIPUNCTURE: CPT

## 2020-07-25 PROCEDURE — 80053 COMPREHEN METABOLIC PANEL: CPT

## 2020-07-25 PROCEDURE — 87040 BLOOD CULTURE FOR BACTERIA: CPT

## 2020-07-25 PROCEDURE — 700105 HCHG RX REV CODE 258: Performed by: INTERNAL MEDICINE

## 2020-07-25 PROCEDURE — 87075 CULTR BACTERIA EXCEPT BLOOD: CPT

## 2020-07-25 PROCEDURE — 83605 ASSAY OF LACTIC ACID: CPT | Mod: 91

## 2020-07-25 PROCEDURE — 82962 GLUCOSE BLOOD TEST: CPT

## 2020-07-25 PROCEDURE — 71045 X-RAY EXAM CHEST 1 VIEW: CPT

## 2020-07-25 PROCEDURE — 700117 HCHG RX CONTRAST REV CODE 255: Performed by: EMERGENCY MEDICINE

## 2020-07-25 PROCEDURE — 93005 ELECTROCARDIOGRAM TRACING: CPT

## 2020-07-25 PROCEDURE — 160038 HCHG SURGERY MINUTES - EA ADDL 1 MIN LEVEL 2: Performed by: UROLOGY

## 2020-07-25 PROCEDURE — 160002 HCHG RECOVERY MINUTES (STAT): Performed by: UROLOGY

## 2020-07-25 PROCEDURE — 87086 URINE CULTURE/COLONY COUNT: CPT

## 2020-07-25 RX ORDER — LABETALOL HYDROCHLORIDE 5 MG/ML
5 INJECTION, SOLUTION INTRAVENOUS
Status: DISCONTINUED | OUTPATIENT
Start: 2020-07-25 | End: 2020-07-25 | Stop reason: HOSPADM

## 2020-07-25 RX ORDER — MEPERIDINE HYDROCHLORIDE 50 MG/ML
12.5 INJECTION INTRAMUSCULAR; INTRAVENOUS; SUBCUTANEOUS
Status: DISCONTINUED | OUTPATIENT
Start: 2020-07-25 | End: 2020-07-25 | Stop reason: HOSPADM

## 2020-07-25 RX ORDER — DEXTROSE MONOHYDRATE 25 G/50ML
50 INJECTION, SOLUTION INTRAVENOUS
Status: DISCONTINUED | OUTPATIENT
Start: 2020-07-25 | End: 2020-07-29

## 2020-07-25 RX ORDER — SODIUM CHLORIDE, SODIUM LACTATE, POTASSIUM CHLORIDE, CALCIUM CHLORIDE 600; 310; 30; 20 MG/100ML; MG/100ML; MG/100ML; MG/100ML
INJECTION, SOLUTION INTRAVENOUS
Status: DISCONTINUED | OUTPATIENT
Start: 2020-07-25 | End: 2020-07-25 | Stop reason: SURG

## 2020-07-25 RX ORDER — PREDNISONE 20 MG/1
40 TABLET ORAL DAILY
COMMUNITY
End: 2020-09-30

## 2020-07-25 RX ORDER — POLYETHYLENE GLYCOL 3350 17 G/17G
1 POWDER, FOR SOLUTION ORAL
Status: DISCONTINUED | OUTPATIENT
Start: 2020-07-25 | End: 2020-08-15

## 2020-07-25 RX ORDER — HYDROMORPHONE HYDROCHLORIDE 1 MG/ML
0.2 INJECTION, SOLUTION INTRAMUSCULAR; INTRAVENOUS; SUBCUTANEOUS
Status: DISCONTINUED | OUTPATIENT
Start: 2020-07-25 | End: 2020-07-25 | Stop reason: HOSPADM

## 2020-07-25 RX ORDER — OXYCODONE HCL 5 MG/5 ML
5 SOLUTION, ORAL ORAL
Status: DISCONTINUED | OUTPATIENT
Start: 2020-07-25 | End: 2020-07-25 | Stop reason: HOSPADM

## 2020-07-25 RX ORDER — MORPHINE SULFATE 4 MG/ML
2-4 INJECTION, SOLUTION INTRAMUSCULAR; INTRAVENOUS
Status: DISCONTINUED | OUTPATIENT
Start: 2020-07-25 | End: 2020-07-28

## 2020-07-25 RX ORDER — BISACODYL 10 MG
10 SUPPOSITORY, RECTAL RECTAL
Status: DISCONTINUED | OUTPATIENT
Start: 2020-07-25 | End: 2020-08-15

## 2020-07-25 RX ORDER — SODIUM CHLORIDE, SODIUM LACTATE, POTASSIUM CHLORIDE, AND CALCIUM CHLORIDE .6; .31; .03; .02 G/100ML; G/100ML; G/100ML; G/100ML
2700 INJECTION, SOLUTION INTRAVENOUS
Status: DISCONTINUED | OUTPATIENT
Start: 2020-07-25 | End: 2020-07-25

## 2020-07-25 RX ORDER — AZATHIOPRINE 50 MG/1
50 TABLET ORAL 2 TIMES DAILY
COMMUNITY
Start: 2020-07-06 | End: 2021-01-01

## 2020-07-25 RX ORDER — BUPIVACAINE HYDROCHLORIDE 2.5 MG/ML
INJECTION, SOLUTION EPIDURAL; INFILTRATION; INTRACAUDAL
Status: DISCONTINUED | OUTPATIENT
Start: 2020-07-25 | End: 2020-07-25 | Stop reason: HOSPADM

## 2020-07-25 RX ORDER — DEXAMETHASONE SODIUM PHOSPHATE 4 MG/ML
INJECTION, SOLUTION INTRA-ARTICULAR; INTRALESIONAL; INTRAMUSCULAR; INTRAVENOUS; SOFT TISSUE PRN
Status: DISCONTINUED | OUTPATIENT
Start: 2020-07-25 | End: 2020-07-25 | Stop reason: SURG

## 2020-07-25 RX ORDER — HALOPERIDOL 5 MG/ML
1 INJECTION INTRAMUSCULAR
Status: DISCONTINUED | OUTPATIENT
Start: 2020-07-25 | End: 2020-07-25 | Stop reason: HOSPADM

## 2020-07-25 RX ORDER — OXYCODONE HCL 5 MG/5 ML
10 SOLUTION, ORAL ORAL
Status: DISCONTINUED | OUTPATIENT
Start: 2020-07-25 | End: 2020-07-25 | Stop reason: HOSPADM

## 2020-07-25 RX ORDER — SODIUM CHLORIDE, SODIUM LACTATE, POTASSIUM CHLORIDE, AND CALCIUM CHLORIDE .6; .31; .03; .02 G/100ML; G/100ML; G/100ML; G/100ML
30 INJECTION, SOLUTION INTRAVENOUS
Status: COMPLETED | OUTPATIENT
Start: 2020-07-25 | End: 2020-07-31

## 2020-07-25 RX ORDER — CLINDAMYCIN PHOSPHATE 600 MG/50ML
600 INJECTION, SOLUTION INTRAVENOUS EVERY 8 HOURS
Status: DISCONTINUED | OUTPATIENT
Start: 2020-07-25 | End: 2020-07-27

## 2020-07-25 RX ORDER — ONDANSETRON 4 MG/1
4 TABLET, ORALLY DISINTEGRATING ORAL EVERY 4 HOURS PRN
Status: DISCONTINUED | OUTPATIENT
Start: 2020-07-25 | End: 2020-08-22 | Stop reason: HOSPADM

## 2020-07-25 RX ORDER — LIDOCAINE HYDROCHLORIDE 20 MG/ML
INJECTION, SOLUTION EPIDURAL; INFILTRATION; INTRACAUDAL; PERINEURAL PRN
Status: DISCONTINUED | OUTPATIENT
Start: 2020-07-25 | End: 2020-07-25 | Stop reason: SURG

## 2020-07-25 RX ORDER — PHENYLEPHRINE HCL IN 0.9% NACL 0.5 MG/5ML
SYRINGE (ML) INTRAVENOUS PRN
Status: DISCONTINUED | OUTPATIENT
Start: 2020-07-25 | End: 2020-07-25 | Stop reason: SURG

## 2020-07-25 RX ORDER — ONDANSETRON 2 MG/ML
4 INJECTION INTRAMUSCULAR; INTRAVENOUS EVERY 4 HOURS PRN
Status: DISCONTINUED | OUTPATIENT
Start: 2020-07-25 | End: 2020-08-22 | Stop reason: HOSPADM

## 2020-07-25 RX ORDER — MORPHINE SULFATE 4 MG/ML
2 INJECTION, SOLUTION INTRAMUSCULAR; INTRAVENOUS
Status: DISCONTINUED | OUTPATIENT
Start: 2020-07-25 | End: 2020-07-25

## 2020-07-25 RX ORDER — CLINDAMYCIN PHOSPHATE 600 MG/50ML
600 INJECTION, SOLUTION INTRAVENOUS ONCE
Status: COMPLETED | OUTPATIENT
Start: 2020-07-25 | End: 2020-07-25

## 2020-07-25 RX ORDER — SODIUM CHLORIDE, SODIUM LACTATE, POTASSIUM CHLORIDE, CALCIUM CHLORIDE 600; 310; 30; 20 MG/100ML; MG/100ML; MG/100ML; MG/100ML
1000 INJECTION, SOLUTION INTRAVENOUS CONTINUOUS
Status: DISCONTINUED | OUTPATIENT
Start: 2020-07-25 | End: 2020-07-26

## 2020-07-25 RX ORDER — SODIUM CHLORIDE, SODIUM LACTATE, POTASSIUM CHLORIDE, CALCIUM CHLORIDE 600; 310; 30; 20 MG/100ML; MG/100ML; MG/100ML; MG/100ML
INJECTION, SOLUTION INTRAVENOUS CONTINUOUS
Status: DISCONTINUED | OUTPATIENT
Start: 2020-07-25 | End: 2020-07-25 | Stop reason: HOSPADM

## 2020-07-25 RX ORDER — HYDROMORPHONE HYDROCHLORIDE 1 MG/ML
1 INJECTION, SOLUTION INTRAMUSCULAR; INTRAVENOUS; SUBCUTANEOUS
Status: COMPLETED | OUTPATIENT
Start: 2020-07-25 | End: 2020-07-26

## 2020-07-25 RX ORDER — HYDROMORPHONE HYDROCHLORIDE 1 MG/ML
0.1 INJECTION, SOLUTION INTRAMUSCULAR; INTRAVENOUS; SUBCUTANEOUS
Status: DISCONTINUED | OUTPATIENT
Start: 2020-07-25 | End: 2020-07-25 | Stop reason: HOSPADM

## 2020-07-25 RX ORDER — POTASSIUM CHLORIDE 7.45 MG/ML
10 INJECTION INTRAVENOUS
Status: COMPLETED | OUTPATIENT
Start: 2020-07-25 | End: 2020-07-26

## 2020-07-25 RX ORDER — HYDROMORPHONE HYDROCHLORIDE 1 MG/ML
0.5 INJECTION, SOLUTION INTRAMUSCULAR; INTRAVENOUS; SUBCUTANEOUS
Status: DISCONTINUED | OUTPATIENT
Start: 2020-07-25 | End: 2020-07-26

## 2020-07-25 RX ORDER — ONDANSETRON 2 MG/ML
4 INJECTION INTRAMUSCULAR; INTRAVENOUS
Status: DISCONTINUED | OUTPATIENT
Start: 2020-07-25 | End: 2020-07-25 | Stop reason: HOSPADM

## 2020-07-25 RX ORDER — IPRATROPIUM BROMIDE AND ALBUTEROL SULFATE 2.5; .5 MG/3ML; MG/3ML
3 SOLUTION RESPIRATORY (INHALATION)
Status: DISCONTINUED | OUTPATIENT
Start: 2020-07-25 | End: 2020-07-25 | Stop reason: HOSPADM

## 2020-07-25 RX ORDER — MIDODRINE HYDROCHLORIDE 5 MG/1
15 TABLET ORAL EVERY EVENING
Status: ON HOLD | COMMUNITY
End: 2020-08-31

## 2020-07-25 RX ORDER — HYDROMORPHONE HYDROCHLORIDE 1 MG/ML
0.4 INJECTION, SOLUTION INTRAMUSCULAR; INTRAVENOUS; SUBCUTANEOUS
Status: DISCONTINUED | OUTPATIENT
Start: 2020-07-25 | End: 2020-07-25 | Stop reason: HOSPADM

## 2020-07-25 RX ORDER — HYDRALAZINE HYDROCHLORIDE 20 MG/ML
5 INJECTION INTRAMUSCULAR; INTRAVENOUS
Status: DISCONTINUED | OUTPATIENT
Start: 2020-07-25 | End: 2020-07-25 | Stop reason: HOSPADM

## 2020-07-25 RX ORDER — AMOXICILLIN 250 MG
2 CAPSULE ORAL 2 TIMES DAILY
Status: DISCONTINUED | OUTPATIENT
Start: 2020-07-25 | End: 2020-08-15

## 2020-07-25 RX ORDER — ONDANSETRON 2 MG/ML
INJECTION INTRAMUSCULAR; INTRAVENOUS PRN
Status: DISCONTINUED | OUTPATIENT
Start: 2020-07-25 | End: 2020-07-25 | Stop reason: SURG

## 2020-07-25 RX ADMIN — VANCOMYCIN HYDROCHLORIDE 2250 MG: 500 INJECTION, POWDER, LYOPHILIZED, FOR SOLUTION INTRAVENOUS at 16:25

## 2020-07-25 RX ADMIN — SODIUM CHLORIDE, POTASSIUM CHLORIDE, SODIUM LACTATE AND CALCIUM CHLORIDE: 600; 310; 30; 20 INJECTION, SOLUTION INTRAVENOUS at 19:26

## 2020-07-25 RX ADMIN — SODIUM CHLORIDE, POTASSIUM CHLORIDE, SODIUM LACTATE AND CALCIUM CHLORIDE 2700 ML: 600; 310; 30; 20 INJECTION, SOLUTION INTRAVENOUS at 14:00

## 2020-07-25 RX ADMIN — MORPHINE SULFATE 2 MG: 4 INJECTION INTRAVENOUS at 17:25

## 2020-07-25 RX ADMIN — PIPERACILLIN AND TAZOBACTAM 3.38 G: 3; .375 INJECTION, POWDER, LYOPHILIZED, FOR SOLUTION INTRAVENOUS; PARENTERAL at 14:55

## 2020-07-25 RX ADMIN — PROPOFOL 100 MG: 10 INJECTION, EMULSION INTRAVENOUS at 19:40

## 2020-07-25 RX ADMIN — MORPHINE SULFATE 2 MG: 4 INJECTION INTRAVENOUS at 18:23

## 2020-07-25 RX ADMIN — POTASSIUM CHLORIDE 10 MEQ: 7.46 INJECTION, SOLUTION INTRAVENOUS at 22:18

## 2020-07-25 RX ADMIN — SODIUM CHLORIDE 5 UNITS/HR: 9 INJECTION, SOLUTION INTRAVENOUS at 17:23

## 2020-07-25 RX ADMIN — MORPHINE SULFATE 4 MG: 4 INJECTION INTRAVENOUS at 23:01

## 2020-07-25 RX ADMIN — DEXAMETHASONE SODIUM PHOSPHATE 4 MG: 4 INJECTION, SOLUTION INTRAMUSCULAR; INTRAVENOUS at 19:26

## 2020-07-25 RX ADMIN — ONDANSETRON 4 MG: 2 INJECTION INTRAMUSCULAR; INTRAVENOUS at 19:26

## 2020-07-25 RX ADMIN — PIPERACILLIN AND TAZOBACTAM 4.5 G: 4; .5 INJECTION, POWDER, LYOPHILIZED, FOR SOLUTION INTRAVENOUS; PARENTERAL at 17:40

## 2020-07-25 RX ADMIN — LIDOCAINE HYDROCHLORIDE 100 MG: 20 INJECTION, SOLUTION EPIDURAL; INFILTRATION; INTRACAUDAL; PERINEURAL at 19:40

## 2020-07-25 RX ADMIN — CLINDAMYCIN IN 5 PERCENT DEXTROSE 600 MG: 12 INJECTION, SOLUTION INTRAVENOUS at 22:08

## 2020-07-25 RX ADMIN — FENTANYL CITRATE 50 MCG: 50 INJECTION, SOLUTION INTRAMUSCULAR; INTRAVENOUS at 19:55

## 2020-07-25 RX ADMIN — CLINDAMYCIN IN 5 PERCENT DEXTROSE 600 MG: 12 INJECTION, SOLUTION INTRAVENOUS at 15:42

## 2020-07-25 RX ADMIN — Medication 100 MCG: at 19:40

## 2020-07-25 RX ADMIN — FENTANYL CITRATE 100 MCG: 50 INJECTION, SOLUTION INTRAMUSCULAR; INTRAVENOUS at 20:15

## 2020-07-25 RX ADMIN — FENTANYL CITRATE 50 MCG: 50 INJECTION, SOLUTION INTRAMUSCULAR; INTRAVENOUS at 19:26

## 2020-07-25 RX ADMIN — IOHEXOL 100 ML: 350 INJECTION, SOLUTION INTRAVENOUS at 15:32

## 2020-07-25 RX ADMIN — SODIUM CHLORIDE, POTASSIUM CHLORIDE, SODIUM LACTATE AND CALCIUM CHLORIDE 1000 ML: 600; 310; 30; 20 INJECTION, SOLUTION INTRAVENOUS at 16:42

## 2020-07-25 RX ADMIN — POTASSIUM CHLORIDE 10 MEQ: 7.46 INJECTION, SOLUTION INTRAVENOUS at 23:20

## 2020-07-25 RX ADMIN — HYDROMORPHONE HYDROCHLORIDE 1 MG: 1 INJECTION, SOLUTION INTRAMUSCULAR; INTRAVENOUS; SUBCUTANEOUS at 15:42

## 2020-07-25 ASSESSMENT — LIFESTYLE VARIABLES
TOTAL SCORE: 0
ON A TYPICAL DAY WHEN YOU DRINK ALCOHOL HOW MANY DRINKS DO YOU HAVE: 0
TOTAL SCORE: 0
HOW MANY TIMES IN THE PAST YEAR HAVE YOU HAD 5 OR MORE DRINKS IN A DAY: 0
EVER HAD A DRINK FIRST THING IN THE MORNING TO STEADY YOUR NERVES TO GET RID OF A HANGOVER: NO
HAVE YOU EVER FELT YOU SHOULD CUT DOWN ON YOUR DRINKING: NO
HAVE PEOPLE ANNOYED YOU BY CRITICIZING YOUR DRINKING: NO
ALCOHOL_USE: NO
EVER FELT BAD OR GUILTY ABOUT YOUR DRINKING: NO
TOTAL SCORE: 0
EVER_SMOKED: YES
AVERAGE NUMBER OF DAYS PER WEEK YOU HAVE A DRINK CONTAINING ALCOHOL: 0
CONSUMPTION TOTAL: NEGATIVE

## 2020-07-25 ASSESSMENT — ENCOUNTER SYMPTOMS
CARDIOVASCULAR NEGATIVE: 1
EYES NEGATIVE: 1
NEUROLOGICAL NEGATIVE: 1
GASTROINTESTINAL NEGATIVE: 1
PSYCHIATRIC NEGATIVE: 1
MUSCULOSKELETAL NEGATIVE: 1
RESPIRATORY NEGATIVE: 1

## 2020-07-25 ASSESSMENT — PAIN DESCRIPTION - DESCRIPTORS: DESCRIPTORS: ACHING

## 2020-07-25 ASSESSMENT — PATIENT HEALTH QUESTIONNAIRE - PHQ9
SUM OF ALL RESPONSES TO PHQ9 QUESTIONS 1 AND 2: 0
2. FEELING DOWN, DEPRESSED, IRRITABLE, OR HOPELESS: NOT AT ALL
1. LITTLE INTEREST OR PLEASURE IN DOING THINGS: NOT AT ALL

## 2020-07-25 ASSESSMENT — FIBROSIS 4 INDEX
FIB4 SCORE: 0.99
FIB4 SCORE: 0.99

## 2020-07-25 ASSESSMENT — PAIN SCALES - GENERAL: PAIN_LEVEL: 0

## 2020-07-25 NOTE — ED NOTES
Med rec completed per pt   Allergies reviewed     Pt states that he takes Hydrea   Alternating doses  Take 500 mg once daily then the next day takes 500 mg twice daily  Then repeat   (Pt took 500 mg twice daily yesterday 7/24/2020)

## 2020-07-25 NOTE — ED NOTES
1345 Late entry Had testicular cyst scrotum rupture 2 wks ago Saw dermatologist and Dx with fungal follicular infection Placed ondoxycycline Had anaphylactic Rx and was admitted Then 1 wk ago had a scrotal hemmorhage Reports on Wed Incr swelling then O$)) marked incr in swelling Redness with streaking and pain

## 2020-07-25 NOTE — ED TRIAGE NOTES
"Presents complaining of testicular pain/infection recurring for a week.  He was seen by a dermatologist initially, a cyst was addressed. He also reports recurrence of central chest pain escalating for the past few days.   Chief Complaint   Patient presents with   • Chest Pain   • Testicle Pain     BP (!) 99/60   Pulse 96   Temp 37.1 °C (98.7 °F) (Temporal)   Resp 18   Ht 1.88 m (6' 2\")   BMI 25.76 kg/m²     "

## 2020-07-25 NOTE — ED NOTES
1450 Pt void per urinal 100 cc concn yellow urine Specimen sent  1500 Rapid pre op COVID screen sent  1514 COD drawn and sent  1516 To CT scan  1529 Returned from CT

## 2020-07-25 NOTE — ASSESSMENT & PLAN NOTE
Sinus here  Cont to hold eliquis post op  Cont lovenox subq for DVT  No oozing from packing  Platelets normal

## 2020-07-25 NOTE — ASSESSMENT & PLAN NOTE
High risk of flare  His complements are always low so wont send  Hold imuran and steroids (see above on steroids)

## 2020-07-25 NOTE — ED PROVIDER NOTES
ED Provider Note    CHIEF COMPLAINT  Chief Complaint   Patient presents with   • Chest Pain   • Testicle Pain       HPI  Francesco Schulte is a 69 y.o. male who presents complaining of abdominal pain, testicle pain and chest pain.  The abdominal pain is located in the right lower quadrant.  Radiates into the right testicle.  Its been getting progressively worse over the past 3 days.  The pain is 10/10 in severity associated with nausea vomiting and dizziness.  Patient states he is also had intermittent chest pain most of the episodes of chest pain lasts less than 10 seconds.  Patient states he has a history of heart disease and has had a multiple MIs in the past.  Patient states he had a cyst on the right testicle which drained on its own last week.      Patient has type 1 diabetes and lupus.  He is on insulin and anticoagulants.  He is also on Eliquis as a blood thinner.      REVIEW OF SYSTEMS  See HPI for further details.  No fever no chills.  No cough or cold symptoms.  No vomiting or diarrhea.  All other systems are negative.    PAST MEDICAL HISTORY  Past Medical History:   Diagnosis Date   • Anesthesia     resistant to pain meds   • Cancer (HCC)     polycythemia vera   • Diabetes (HCC)     insulin and oral meds   • Family history of punctured lung 2002    left lung   • Heart attack (HCC) 03/2017   • Hemorrhagic disorder (HCC)     on blood thinners   • High cholesterol    • Ischemic cardiomyopathy    • Kidney stones     hx of kidney stones   • Orthostatic hypotension 3/29/2018   • Pain     headache pain   • Polycythemia vera(238.4)    • Stroke (HCC) 2016    r/t afib; eye issues resolved afterward   • Urinary bladder disorder     enlarged prostate, weak stream, difficulty urinating   • Vertigo        FAMILY HISTORY  No family history on file.    SOCIAL HISTORY  Social History     Socioeconomic History   • Marital status:      Spouse name: Not on file   • Number of children: Not on file   • Years of  education: Not on file   • Highest education level: Not on file   Occupational History   • Not on file   Social Needs   • Financial resource strain: Not on file   • Food insecurity     Worry: Not on file     Inability: Not on file   • Transportation needs     Medical: Not on file     Non-medical: Not on file   Tobacco Use   • Smoking status: Never Smoker   • Smokeless tobacco: Never Used   Substance and Sexual Activity   • Alcohol use: No   • Drug use: No   • Sexual activity: Not on file   Lifestyle   • Physical activity     Days per week: Not on file     Minutes per session: Not on file   • Stress: Not on file   Relationships   • Social connections     Talks on phone: Not on file     Gets together: Not on file     Attends Restoration service: Not on file     Active member of club or organization: Not on file     Attends meetings of clubs or organizations: Not on file     Relationship status: Not on file   • Intimate partner violence     Fear of current or ex partner: Not on file     Emotionally abused: Not on file     Physically abused: Not on file     Forced sexual activity: Not on file   Other Topics Concern   • Not on file   Social History Narrative   • Not on file       SURGICAL HISTORY  Past Surgical History:   Procedure Laterality Date   • TEMPORAL ARTERY BIOPSY Right 6/26/2018    Procedure: TEMPORAL ARTERY BIOPSY;  Surgeon: Rajendra Aguilar M.D.;  Location: SURGERY SAME DAY [facility] ORS;  Service: General   • CAROTID ENDARTERECTOMY Right 3/21/2018    Procedure: CAROTID ENDARTERECTOMY- W/EEG MONITORING;  Surgeon: Benny Morfin M.D.;  Location: SURGERY [facility];  Service: General   • APPENDECTOMY     • CATH PLACEMENT      right arm   • LAMINOTOMY      diskectomy; C4   • OTHER CARDIAC SURGERY      stents x 3       CURRENT MEDICATIONS  @ He is Rishi@    ALLERGIES  Allergies   Allergen Reactions   • Doxycycline      Swelling lips and difficulties swallowing   • Beta Adrenergic Blockers Unspecified      "dizziness   • Metformin Unspecified and Palpitations     Cardiac effects  \"Similar to a heart attack, I was hospitalized\"   • Simvastatin      Other reaction(s): Other (Specify with Comments)  Myalgias  Myalgias   • Statins [Hmg-Coa-R Inhibitors]      Muscle ache, joint pain       PHYSICAL EXAM  VITAL SIGNS: /81   Pulse 90   Temp 37.1 °C (98.7 °F) (Temporal)   Resp 13   Ht 1.88 m (6' 2.02\")   Wt 90.7 kg (200 lb)   SpO2 94%   BMI 25.67 kg/m²   Constitutional: Ill-appearing elderly male  HENT: Normocephalic, Atraumatic, Bilateral external ears normal, Oropharynx dry  Eyes: BRENNAN, EOMI, Conjunctiva normal,   Neck: Normal range of motion, No tenderness,   Lymphatic: No lymphadenopathy noted.   Cardiovascular: Normal heart rate, Normal rhythm, No murmurs,  Thorax & Lungs: Normal breath sounds, No respiratory distress,   : Extensive tenderness redness warmth and swelling over the right testicle and hemiscrotum.  There is redness and warmth up into the right inguinal region with extensive soft tissue swelling.  The swelling and induration runs posteriorly towards the perianal region  Abdomen: Normal bowel sounds.  Soft.  Tenderness in the right lower quadrant and right inguinal region  Musculoskeletal: No CVA tenderness  Skin: Warm, Dry, No erythema, No rash.   Back: No tenderness, No CVA tenderness.   Extremities: No edema, No tenderness, No cyanosis, No clubbing. Dorsalis pedis pulses 2+ equal bilaterally. Radial pulses 2+ equal bilaterally    RADIOLOGY/PROCEDURES  CT-ABDOMEN-PELVIS WITH   Final Result      1.  Diffuse scrotal edema, eccentric to the RIGHT with extensive soft tissue gas present particularly in the RIGHT groin and hemiscrotum, consistent with Landon's gangrene.   2.  No discrete drainable abscess demonstrated.   3.  Minimal retroperitoneal adenopathy, nonspecific.   4.  Nonobstructing LEFT kidney stone.   5.  Splenomegaly.   6.  Cystic lesion in the pancreatic tail measuring 19 mm.  " Neoplasm is not excluded.  Consider further evaluation with MRI.      These findings were discussed with AYALA HAMPTON on 7/25/2020 3:41 PM.      DX-CHEST-PORTABLE (1 VIEW)   Final Result      1.  There is no acute cardiopulmonary process.            Results for orders placed or performed during the hospital encounter of 07/25/20   Lactic acid (lactate): Repeat if initial lactic acid result is greater than 2   Result Value Ref Range    Lactic Acid 2.5 (H) 0.5 - 2.0 mmol/L   CBC WITH DIFFERENTIAL   Result Value Ref Range    WBC 14.4 (H) 4.8 - 10.8 K/uL    RBC 5.23 4.70 - 6.10 M/uL    Hemoglobin 13.3 (L) 14.0 - 18.0 g/dL    Hematocrit 43.2 42.0 - 52.0 %    MCV 82.6 81.4 - 97.8 fL    MCH 25.4 (L) 27.0 - 33.0 pg    MCHC 30.8 (L) 33.7 - 35.3 g/dL    RDW 55.0 (H) 35.9 - 50.0 fL    Platelet Count 255 164 - 446 K/uL    Neutrophils-Polys 93.60 (H) 44.00 - 72.00 %    Lymphocytes 1.50 (L) 22.00 - 41.00 %    Monocytes 2.70 0.00 - 13.40 %    Eosinophils 0.20 0.00 - 6.90 %    Basophils 0.40 0.00 - 1.80 %    Immature Granulocytes 1.60 (H) 0.00 - 0.90 %    Nucleated RBC 0.00 /100 WBC    Neutrophils (Absolute) 13.44 (H) 1.82 - 7.42 K/uL    Lymphs (Absolute) 0.21 (L) 1.00 - 4.80 K/uL    Monos (Absolute) 0.39 0.00 - 0.85 K/uL    Eos (Absolute) 0.03 0.00 - 0.51 K/uL    Baso (Absolute) 0.06 0.00 - 0.12 K/uL    Immature Granulocytes (abs) 0.23 (H) 0.00 - 0.11 K/uL    NRBC (Absolute) 0.00 K/uL   COMP METABOLIC PANEL   Result Value Ref Range    Sodium 132 (L) 135 - 145 mmol/L    Potassium 3.7 3.6 - 5.5 mmol/L    Chloride 96 96 - 112 mmol/L    Co2 23 20 - 33 mmol/L    Anion Gap 13.0 7.0 - 16.0    Glucose 479 (H) 65 - 99 mg/dL    Bun 28 (H) 8 - 22 mg/dL    Creatinine 0.89 0.50 - 1.40 mg/dL    Calcium 8.5 8.4 - 10.2 mg/dL    AST(SGOT) 10 (L) 12 - 45 U/L    ALT(SGPT) 7 2 - 50 U/L    Alkaline Phosphatase 138 (H) 30 - 99 U/L    Total Bilirubin 0.8 0.1 - 1.5 mg/dL    Albumin 2.5 (L) 3.2 - 4.9 g/dL    Total Protein 6.1 6.0 - 8.2 g/dL    Globulin  3.6 (H) 1.9 - 3.5 g/dL    A-G Ratio 0.7 g/dL   URINALYSIS    Specimen: Urine, Cath   Result Value Ref Range    Color Yellow     Character Clear     Specific Gravity 1.015 <1.035    Ph 5.5 5.0 - 8.0    Glucose 500 (A) Negative mg/dL    Ketones Trace (A) Negative mg/dL    Protein Negative Negative mg/dL    Bilirubin Negative Negative    Nitrite Negative Negative    Leukocyte Esterase Negative Negative    Occult Blood Negative Negative    Micro Urine Req see below    TROPONIN   Result Value Ref Range    Troponin T 24 (H) 6 - 19 ng/L   LACTIC ACID   Result Value Ref Range    Lactic Acid 2.9 (H) 0.5 - 2.0 mmol/L   COD - Adult (Type and Screen)   Result Value Ref Range    ABO Grouping Only O     Rh Grouping Only POS     Antibody Screen-Cod NEG    Prothrombin time (INR)   Result Value Ref Range    PT 19.3 (H) 12.0 - 14.6 sec    INR 1.59 (H) 0.87 - 1.13   ESTIMATED GFR   Result Value Ref Range    GFR If African American >60 >60 mL/min/1.73 m 2    GFR If Non African American >60 >60 mL/min/1.73 m 2   COVID/SARS CoV-2 PCR    Specimen: Nasopharyngeal; Respirate   Result Value Ref Range    COVID Order Status Received    SARS-CoV-2, PCR (In-House)   Result Value Ref Range    SARS-CoV-2 Source NP Swab     SARS-CoV-2 by PCR NotDetected    ABO Rh Confirm   Result Value Ref Range    ABO Rh Confirm O POS    FRESH FROZEN PLASMA   Result Value Ref Range    Component Ft       FPT                 Plasma, Thawed      [id number]   selected     20   16:07      Product Type Plasma  Thawed     Dispense Status selected     Unit Number (Barcoded) [id number]     Product Code (Barcoded) Q7868R70     Blood Type (Barcoded) 5100    EKG   Result Value Ref Range    Report       [facility]    Test Date:  2020  Pt Name:    MARZENA ROMEO             Department: [facility]  MRN:        [id number]                      Room:       -ROOM 2  Gender:     Male                         Technician: DELFINA  :         1950                   Requested By:ER TRIAGE PROTOCOL  Order #:    [id number]                    Reading MD:    Measurements  Intervals                                Axis  Rate:       91                           P:          55  MN:         168                          QRS:        -43  QRSD:       108                          T:          115  QT:         384  QTc:        473    Interpretive Statements  SINUS RHYTHM  INCOMPLETE LEFT BUNDLE BRANCH BLOCK  ANTERIOR Q WAVES, POSSIBLY DUE TO ILBBB  Compared to ECG 07/01/2020 07:46:55  Q waves now present  Ventricular premature complex(es) no longer present  Myocardial infarct finding no longer present       EKG: Sinus rhythm at a rate of 90.  Normal P waves.  Abnormal QRS with late R wave progression.  There is an incomplete left bundle branch block.  There is borderline J-point elevation V1 V2.  Normal ST segments laterally.  Abnormal EKG showing an incomplete left bundle branch block.  There is no signs of acute ischemia    Patient is critically ill.   The patient continues to have: Sepsis  The vital organ system that is affected is the: Cardiovascular  If untreated there is a high chance of deterioration into: Shock  And eventually death.   The critical care that I am providing today is: Involving medical management antibiotics and multiple consultations  The critical that has been undertaken is medically complex.   There has been no overlap in critical care time.   Critical Care Time not including procedures: 45 minutes      COURSE & MEDICAL DECISION MAKING  Pertinent Labs & Imaging studies reviewed. 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  Urology was consulted at 2:5 PM.  Decision was made to treat with broad-spectrum antibiotics.  Blood products.  And obtain a CT scan to rule out incarcerated right inguinal hernia.  CT scan shows extensive gas within the right hemiscrotum.  There is also a finding of a 1.9 cm pancreatic  cyst.  I told the patient and his daughter that this could not be ruled out for malignancy and he would need an MRI of his pancreas when this hospitalization was over.    I discussed this case again with Dr. Roge Nolasco at 3:45 PM when the CT scan was done.  He wanted the patient sent to the ICU for fluid resuscitation and FFP.  Dr. mora will be the admitting doctor    Patient was admitted to the ICU in guarded condition.  Patient was given a 30 cc/kg bolus of LR is mandated by sepsis criteria    HYDRATION: Based on the patient's presentation of Acute Kindney Injury the patient was given IV fluids. IV Hydration was used because oral hydration was not adequate alone. Upon recheck following hydration, the patient was improved.  IV fluid bolus was given in accordance to sepsis criteria..    Blood work was concerning for hyperglycemia which was treated with IV fluids.  A white count of 14,000.  Patient's troponin was borderline but he has no sign of acute ischemia on his EKG        .    FINAL IMPRESSION  1.  Sepsis  2.  Landon's gangrene  3.  Pancreatic mass  4.  Critical care 45 minutes    Please note that this dictation was created using voice recognition software. I have worked with consultants from the vendor as well as technical experts from [facility] access [facility] to optimize the interface. I have made every reasonable attempt to correct obvious errors, but I expect that there are errors of grammar and possibly content that I did not discover before finalizing the note.    Electronically signed by: Benny Anderson M.D., 7/25/2020 4:09 PM

## 2020-07-25 NOTE — H&P
Pulmonary History & Physical Note    Date of Service  7/25/2020    Primary Care Physician  Malissa Thomson M.D.    Code Status  Full Code    Chief Complaint  Chief Complaint   Patient presents with   • Chest Pain   • Testicle Pain       History of Presenting Illness  69 y.o. male who presented 7/25/2020 with hx of SLE dx > 2019 on imuran & prednisone Followed by Dr. 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  Has had a rash on his inner thigh that he is following up with Dermatology    Review of Systems  Review of Systems   Constitutional: Positive for malaise/fatigue.   HENT: Negative.    Eyes: Negative.    Respiratory: Negative.    Cardiovascular: Negative.    Gastrointestinal: Negative.    Genitourinary: Negative.         Excruciating scrotal pain and swelling   Musculoskeletal: Negative.    Skin: Negative.    Neurological: Negative.    Endo/Heme/Allergies: Negative.    Psychiatric/Behavioral: Negative.        Past Medical History   has a past medical history of Anesthesia, Cancer ([facility]), Diabetes ([facility]), Family history of punctured lung (2002), Heart attack ([facility]) (03/2017), Hemorrhagic disorder ([facility]), High cholesterol, Ischemic cardiomyopathy, Kidney stones, Orthostatic hypotension (3/29/2018), Pain, Polycythemia vera(238.4), Stroke ([facility]) (2016), Urinary bladder disorder, and Vertigo.    Surgical History   has a past surgical history that includes other cardiac surgery; cath placement; laminotomy; appendectomy; carotid endarterectomy (Right, 3/21/2018); and temporal artery biopsy (Right, 6/26/2018).     Family History  family history is not on file.     Social History   reports that he has never smoked. He has never used smokeless tobacco. He reports that he does not drink  "alcohol or use drugs.    Allergies  Allergies   Allergen Reactions   • Doxycycline      Swelling lips and difficulties swallowing   • Beta Adrenergic Blockers Unspecified     dizziness   • Metformin Unspecified and Palpitations     Cardiac effects  \"Similar to a heart attack, I was hospitalized\"   • Simvastatin      Other reaction(s): Other (Specify with Comments)  Myalgias  Myalgias   • Statins [Hmg-Coa-R Inhibitors]      Muscle ache, joint pain       Medications  Prior to Admission Medications   Prescriptions Last Dose Informant Patient Reported? Taking?   Insulin Degludec (TRESIBA FLEXTOUCH) 200 UNIT/ML Solution Pen-injector 7/24/2020 at PM Patient Yes No   Sig: Inject 18 Units as instructed every evening.   apixaban (ELIQUIS) 5mg Tab 7/25/2020 at AM Patient No No   Sig: Take 1 Tab by mouth 2 Times a Day.   aspirin EC (ECOTRIN) 81 MG Tablet Delayed Response 7/25/2020 at AM Patient Yes No   Sig: Take 1 Tab by mouth every day.   azaTHIOprine (IMURAN) 50 MG Tab 7/25/2020 at AM Patient Yes No   Sig: Take 50 mg by mouth 2 Times a Day. TAKE 1 TABLET BY MOUTH TWICE A DAY   finasteride (PROSCAR) 5 MG Tab 7/24/2020 at PM Patient Yes No   Sig: Take 5 mg by mouth every evening.   hydroxyurea (HYDREA) 500 MG Cap 7/24/2020 at PM Patient Yes No   Sig: Take 500 mg by mouth See Admin Instructions. Alternating doses  Take 500 mg once daily then the next day takes 500 mg twice daily  Then repeat   midodrine (PROAMATINE) 5 MG Tab 7/24/2020 at PM Patient Yes Yes   Sig: Take 15 mg by mouth every evening.   naproxen (NAPROSYN) 250 MG Tab 7/25/2020 at 0400 Patient Yes No   Sig: Take 750 mg by mouth 2 times a day as needed. Indications: Pain   omeprazole (PRILOSEC) 20 MG delayed-release capsule 7/25/2020 at AM Patient No No   Sig: Take 1 Cap by mouth every day.   predniSONE (DELTASONE) 20 MG Tab 7/25/2020 at AM Patient Yes Yes   Sig: Take 40 mg by mouth every day.   tamsulosin (FLOMAX) 0.4 MG capsule 7/24/2020 at PM Patient Yes No "   Sig: Take 0.8 mg by mouth every evening.   therapeutic multivitamin-minerals (THERAGRAN-M) Tab 7/25/2020 at AM Patient Yes No   Sig: Take 1 Tab by mouth every day.      Facility-Administered Medications: None       Physical Exam  Temp:  [36.2 °C (97.2 °F)-37.1 °C (98.7 °F)] 36.2 °C (97.2 °F)  Pulse:  [] 86  Resp:  [13-35] 22  BP: ()/(60-82) 142/73  SpO2:  [94 %-98 %] 96 %    Physical Exam  Constitutional:       Appearance: He is ill-appearing.   HENT:      Head: Normocephalic and atraumatic.      Nose: Nose normal.      Mouth/Throat:      Mouth: Mucous membranes are dry.   Eyes:      Extraocular Movements: Extraocular movements intact.      Pupils: Pupils are equal, round, and reactive to light.   Neck:      Musculoskeletal: Normal range of motion.   Cardiovascular:      Rate and Rhythm: Normal rate.   Pulmonary:      Effort: Pulmonary effort is normal.      Breath sounds: Normal breath sounds.   Abdominal:      General: Bowel sounds are normal. There is no distension.      Palpations: Abdomen is soft.   Genitourinary:     Comments: Marked scrotal swelling > 10 cm right larger than left  Musculoskeletal:      Right lower leg: No edema.      Left lower leg: No edema.   Skin:     Coloration: Skin is not jaundiced.      Findings: Erythema present.      Comments: On the scrotum   Neurological:      Mental Status: He is alert.         Laboratory:  Recent Labs     07/25/20  1400   WBC 14.4*   RBC 5.23   HEMOGLOBIN 13.3*   HEMATOCRIT 43.2   MCV 82.6   MCH 25.4*   MCHC 30.8*   RDW 55.0*   PLATELETCT 255     Recent Labs     07/25/20  1400   SODIUM 132*   POTASSIUM 3.7   CHLORIDE 96   CO2 23   GLUCOSE 479*   BUN 28*   CREATININE 0.89   CALCIUM 8.5     Recent Labs     07/25/20  1400   ALTSGPT 7   ASTSGOT 10*   ALKPHOSPHAT 138*   TBILIRUBIN 0.8   GLUCOSE 479*     Recent Labs     07/25/20  1400   INR 1.59*     No results for input(s): NTPROBNP in the last 72 hours.      Recent Labs     07/25/20  1400   TROPONINT  24*       Imaging:  CT-ABDOMEN-PELVIS WITH   Final Result      1.  Diffuse scrotal edema, eccentric to the RIGHT with extensive soft tissue gas present particularly in the RIGHT groin and hemiscrotum, consistent with Landon's gangrene.   2.  No discrete drainable abscess demonstrated.   3.  Minimal retroperitoneal adenopathy, nonspecific.   4.  Nonobstructing LEFT kidney stone.   5.  Splenomegaly.   6.  Cystic lesion in the pancreatic tail measuring 19 mm.  Neoplasm is not excluded.  Consider further evaluation with MRI.      These findings were discussed with AYALA HAMPTON on 7/25/2020 3:41 PM.      DX-CHEST-PORTABLE (1 VIEW)   Final Result      1.  There is no acute cardiopulmonary process.            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 Nolasco Urologist contacted   Pt to receive two FFPs pre op as on eloquis  2 units PRBC on hold for OR  NPO for OR  IV fluids  cc/hr  Received Vanc/zosyn and clindamycin in ER and continue in ICU  Pain meds: dilaudid and morphine    Main concern is that he is chronically on steroids and he may develop adrenal insufficiency but on the flip side steroids worsen his risk of healing  At this time holding steroids and needs to be reassessed post op    CAD (coronary artery disease)- (present on admission)  Assessment & Plan  Hold cardiac meds    Type 2 diabetes mellitus, with long-term current use of insulin (HCC)- (present on admission)  Assessment & Plan  At this time strict NPO for OR  Insulin drip to maintain -180    Paroxysmal A-fib (HCC)- (present on admission)  Assessment & Plan  Holding eloquis as headed to the operating room  Post op pending on bleeding reassess for lovenox    Pancreatic cyst  Assessment & Plan  Tail of pancreas  Will need follow up outpt    SLE (systemic lupus erythematosus related syndrome) (HCC)  Assessment & Plan  High risk of flare  His complements are always low so  wont send  Hold imuran and steroids (see above on steroids)

## 2020-07-26 LAB
ALBUMIN SERPL BCP-MCNC: 2 G/DL (ref 3.2–4.9)
ALBUMIN/GLOB SERPL: 0.6 G/DL
ALP SERPL-CCNC: 73 U/L (ref 30–99)
ALT SERPL-CCNC: 7 U/L (ref 2–50)
ANION GAP SERPL CALC-SCNC: 8 MMOL/L (ref 7–16)
AST SERPL-CCNC: 11 U/L (ref 12–45)
BILIRUB SERPL-MCNC: 0.6 MG/DL (ref 0.1–1.5)
BUN SERPL-MCNC: 17 MG/DL (ref 8–22)
CALCIUM SERPL-MCNC: 7.9 MG/DL (ref 8.4–10.2)
CHLORIDE SERPL-SCNC: 106 MMOL/L (ref 96–112)
CO2 SERPL-SCNC: 24 MMOL/L (ref 20–33)
CREAT SERPL-MCNC: 0.53 MG/DL (ref 0.5–1.4)
ERYTHROCYTE [DISTWIDTH] IN BLOOD BY AUTOMATED COUNT: 54.7 FL (ref 35.9–50)
GLOBULIN SER CALC-MCNC: 3.1 G/DL (ref 1.9–3.5)
GLUCOSE BLD-MCNC: 102 MG/DL (ref 65–99)
GLUCOSE BLD-MCNC: 104 MG/DL (ref 65–99)
GLUCOSE BLD-MCNC: 106 MG/DL (ref 65–99)
GLUCOSE BLD-MCNC: 114 MG/DL (ref 65–99)
GLUCOSE BLD-MCNC: 126 MG/DL (ref 65–99)
GLUCOSE BLD-MCNC: 136 MG/DL (ref 65–99)
GLUCOSE BLD-MCNC: 151 MG/DL (ref 65–99)
GLUCOSE BLD-MCNC: 174 MG/DL (ref 65–99)
GLUCOSE BLD-MCNC: 202 MG/DL (ref 65–99)
GLUCOSE BLD-MCNC: 203 MG/DL (ref 65–99)
GLUCOSE BLD-MCNC: 211 MG/DL (ref 65–99)
GLUCOSE BLD-MCNC: 385 MG/DL (ref 65–99)
GLUCOSE BLD-MCNC: 71 MG/DL (ref 65–99)
GLUCOSE BLD-MCNC: 97 MG/DL (ref 65–99)
GLUCOSE SERPL-MCNC: 109 MG/DL (ref 65–99)
HCT VFR BLD AUTO: 35.3 % (ref 42–52)
HGB BLD-MCNC: 11.2 G/DL (ref 14–18)
LACTATE BLD-SCNC: 1.2 MMOL/L (ref 0.5–2)
LACTATE BLD-SCNC: 1.3 MMOL/L (ref 0.5–2)
MCH RBC QN AUTO: 25.8 PG (ref 27–33)
MCHC RBC AUTO-ENTMCNC: 31.7 G/DL (ref 33.7–35.3)
MCV RBC AUTO: 81.3 FL (ref 81.4–97.8)
PLATELET # BLD AUTO: 247 K/UL (ref 164–446)
POTASSIUM SERPL-SCNC: 3.7 MMOL/L (ref 3.6–5.5)
POTASSIUM SERPL-SCNC: 3.9 MMOL/L (ref 3.6–5.5)
PROT SERPL-MCNC: 5.1 G/DL (ref 6–8.2)
RBC # BLD AUTO: 4.34 M/UL (ref 4.7–6.1)
SODIUM SERPL-SCNC: 138 MMOL/L (ref 135–145)
WBC # BLD AUTO: 11 K/UL (ref 4.8–10.8)

## 2020-07-26 PROCEDURE — 84132 ASSAY OF SERUM POTASSIUM: CPT

## 2020-07-26 PROCEDURE — 80053 COMPREHEN METABOLIC PANEL: CPT

## 2020-07-26 PROCEDURE — 700105 HCHG RX REV CODE 258: Performed by: INTERNAL MEDICINE

## 2020-07-26 PROCEDURE — 700102 HCHG RX REV CODE 250 W/ 637 OVERRIDE(OP): Performed by: INTERNAL MEDICINE

## 2020-07-26 PROCEDURE — A9270 NON-COVERED ITEM OR SERVICE: HCPCS | Performed by: INTERNAL MEDICINE

## 2020-07-26 PROCEDURE — 83605 ASSAY OF LACTIC ACID: CPT

## 2020-07-26 PROCEDURE — 700111 HCHG RX REV CODE 636 W/ 250 OVERRIDE (IP): Performed by: HOSPITALIST

## 2020-07-26 PROCEDURE — 99233 SBSQ HOSP IP/OBS HIGH 50: CPT | Performed by: INTERNAL MEDICINE

## 2020-07-26 PROCEDURE — 700102 HCHG RX REV CODE 250 W/ 637 OVERRIDE(OP): Performed by: HOSPITALIST

## 2020-07-26 PROCEDURE — 85027 COMPLETE CBC AUTOMATED: CPT

## 2020-07-26 PROCEDURE — 770006 HCHG ROOM/CARE - MED/SURG/GYN SEMI*

## 2020-07-26 PROCEDURE — 700111 HCHG RX REV CODE 636 W/ 250 OVERRIDE (IP): Performed by: INTERNAL MEDICINE

## 2020-07-26 PROCEDURE — 700111 HCHG RX REV CODE 636 W/ 250 OVERRIDE (IP): Performed by: EMERGENCY MEDICINE

## 2020-07-26 PROCEDURE — 700101 HCHG RX REV CODE 250: Performed by: INTERNAL MEDICINE

## 2020-07-26 PROCEDURE — 82962 GLUCOSE BLOOD TEST: CPT | Mod: 91

## 2020-07-26 RX ORDER — POTASSIUM CHLORIDE 7.45 MG/ML
10 INJECTION INTRAVENOUS ONCE
Status: COMPLETED | OUTPATIENT
Start: 2020-07-26 | End: 2020-07-26

## 2020-07-26 RX ORDER — HYDROMORPHONE HYDROCHLORIDE 1 MG/ML
1 INJECTION, SOLUTION INTRAMUSCULAR; INTRAVENOUS; SUBCUTANEOUS
Status: DISCONTINUED | OUTPATIENT
Start: 2020-07-26 | End: 2020-07-28

## 2020-07-26 RX ORDER — POTASSIUM CHLORIDE 7.45 MG/ML
10 INJECTION INTRAVENOUS
Status: COMPLETED | OUTPATIENT
Start: 2020-07-26 | End: 2020-07-26

## 2020-07-26 RX ORDER — OMEPRAZOLE 20 MG/1
20 CAPSULE, DELAYED RELEASE ORAL DAILY
Status: DISCONTINUED | OUTPATIENT
Start: 2020-07-26 | End: 2020-08-19

## 2020-07-26 RX ORDER — INSULIN GLARGINE 100 [IU]/ML
18 INJECTION, SOLUTION SUBCUTANEOUS EVERY EVENING
Status: DISCONTINUED | OUTPATIENT
Start: 2020-07-26 | End: 2020-07-28

## 2020-07-26 RX ADMIN — SENNOSIDES-DOCUSATE SODIUM TAB 8.6-50 MG 2 TABLET: 8.6-5 TAB at 18:29

## 2020-07-26 RX ADMIN — CLINDAMYCIN IN 5 PERCENT DEXTROSE 600 MG: 12 INJECTION, SOLUTION INTRAVENOUS at 14:38

## 2020-07-26 RX ADMIN — MORPHINE SULFATE 4 MG: 4 INJECTION INTRAVENOUS at 00:58

## 2020-07-26 RX ADMIN — HYDROMORPHONE HYDROCHLORIDE 1 MG: 1 INJECTION, SOLUTION INTRAMUSCULAR; INTRAVENOUS; SUBCUTANEOUS at 12:24

## 2020-07-26 RX ADMIN — HYDROMORPHONE HYDROCHLORIDE 0.5 MG: 1 INJECTION, SOLUTION INTRAMUSCULAR; INTRAVENOUS; SUBCUTANEOUS at 21:35

## 2020-07-26 RX ADMIN — PIPERACILLIN AND TAZOBACTAM 4.5 G: 4; .5 INJECTION, POWDER, LYOPHILIZED, FOR SOLUTION INTRAVENOUS; PARENTERAL at 12:15

## 2020-07-26 RX ADMIN — SODIUM CHLORIDE, POTASSIUM CHLORIDE, SODIUM LACTATE AND CALCIUM CHLORIDE 1000 ML: 600; 310; 30; 20 INJECTION, SOLUTION INTRAVENOUS at 01:23

## 2020-07-26 RX ADMIN — ENOXAPARIN SODIUM 40 MG: 40 INJECTION SUBCUTANEOUS at 18:29

## 2020-07-26 RX ADMIN — INSULIN HUMAN 2 UNITS: 100 INJECTION, SOLUTION PARENTERAL at 16:39

## 2020-07-26 RX ADMIN — INSULIN HUMAN 4 UNITS: 100 INJECTION, SOLUTION PARENTERAL at 20:29

## 2020-07-26 RX ADMIN — PIPERACILLIN AND TAZOBACTAM 4.5 G: 4; .5 INJECTION, POWDER, LYOPHILIZED, FOR SOLUTION INTRAVENOUS; PARENTERAL at 20:33

## 2020-07-26 RX ADMIN — POTASSIUM CHLORIDE 10 MEQ: 7.46 INJECTION, SOLUTION INTRAVENOUS at 06:09

## 2020-07-26 RX ADMIN — SENNOSIDES-DOCUSATE SODIUM TAB 8.6-50 MG 2 TABLET: 8.6-5 TAB at 06:30

## 2020-07-26 RX ADMIN — PIPERACILLIN AND TAZOBACTAM 4.5 G: 4; .5 INJECTION, POWDER, LYOPHILIZED, FOR SOLUTION INTRAVENOUS; PARENTERAL at 04:55

## 2020-07-26 RX ADMIN — HYDROMORPHONE HYDROCHLORIDE 0.5 MG: 1 INJECTION, SOLUTION INTRAMUSCULAR; INTRAVENOUS; SUBCUTANEOUS at 18:29

## 2020-07-26 RX ADMIN — HYDROMORPHONE HYDROCHLORIDE 1 MG: 1 INJECTION, SOLUTION INTRAMUSCULAR; INTRAVENOUS; SUBCUTANEOUS at 07:56

## 2020-07-26 RX ADMIN — OMEPRAZOLE 20 MG: 20 CAPSULE, DELAYED RELEASE ORAL at 10:54

## 2020-07-26 RX ADMIN — CLINDAMYCIN IN 5 PERCENT DEXTROSE 600 MG: 12 INJECTION, SOLUTION INTRAVENOUS at 21:35

## 2020-07-26 RX ADMIN — HYDROMORPHONE HYDROCHLORIDE 0.5 MG: 1 INJECTION, SOLUTION INTRAMUSCULAR; INTRAVENOUS; SUBCUTANEOUS at 15:47

## 2020-07-26 RX ADMIN — INSULIN GLARGINE 18 UNITS: 100 INJECTION, SOLUTION SUBCUTANEOUS at 20:30

## 2020-07-26 RX ADMIN — POTASSIUM CHLORIDE 10 MEQ: 7.46 INJECTION, SOLUTION INTRAVENOUS at 07:18

## 2020-07-26 RX ADMIN — CLINDAMYCIN IN 5 PERCENT DEXTROSE 600 MG: 12 INJECTION, SOLUTION INTRAVENOUS at 05:03

## 2020-07-26 RX ADMIN — POTASSIUM CHLORIDE 10 MEQ: 7.46 INJECTION, SOLUTION INTRAVENOUS at 02:10

## 2020-07-26 RX ADMIN — MORPHINE SULFATE 4 MG: 4 INJECTION INTRAVENOUS at 06:37

## 2020-07-26 ASSESSMENT — COGNITIVE AND FUNCTIONAL STATUS - GENERAL
MOBILITY SCORE: 18
CLIMB 3 TO 5 STEPS WITH RAILING: A LITTLE
SUGGESTED CMS G CODE MODIFIER DAILY ACTIVITY: CJ
SUGGESTED CMS G CODE MODIFIER MOBILITY: CK
MOVING FROM LYING ON BACK TO SITTING ON SIDE OF FLAT BED: A LITTLE
HELP NEEDED FOR BATHING: A LITTLE
DRESSING REGULAR UPPER BODY CLOTHING: A LITTLE
MOVING TO AND FROM BED TO CHAIR: A LITTLE
WALKING IN HOSPITAL ROOM: A LITTLE
DRESSING REGULAR LOWER BODY CLOTHING: A LITTLE
STANDING UP FROM CHAIR USING ARMS: A LITTLE
TOILETING: A LITTLE
DAILY ACTIVITIY SCORE: 20
TURNING FROM BACK TO SIDE WHILE IN FLAT BAD: A LITTLE

## 2020-07-26 ASSESSMENT — ENCOUNTER SYMPTOMS
NEUROLOGICAL NEGATIVE: 1
FEVER: 0
EYES NEGATIVE: 1
MUSCULOSKELETAL NEGATIVE: 1
ABDOMINAL PAIN: 1
RESPIRATORY NEGATIVE: 1
PSYCHIATRIC NEGATIVE: 1
GASTROINTESTINAL NEGATIVE: 1
CHILLS: 0
DIAPHORESIS: 0
CARDIOVASCULAR NEGATIVE: 1

## 2020-07-26 ASSESSMENT — FIBROSIS 4 INDEX: FIB4 SCORE: 1.03

## 2020-07-26 NOTE — CARE PLAN
Problem: Safety  Goal: Will remain free from falls  Outcome: PROGRESSING AS EXPECTED  Intervention: Implement fall precautions  Flowsheets  Taken 7/26/2020 1030  Bed Alarm: Yes - Alarm On  Taken 7/26/2020 0800  Environmental Precautions:   Treaded Slipper Socks on Patient   Personal Belongings, Wastebasket, Call Bell etc. in Easy Reach   Report Given to Other Health Care Providers Regarding Fall Risk   Bed in Low Position   Communication Sign for Patients & Families   Mobility Assessed & Appropriate Sign Placed     Problem: Knowledge Deficit  Goal: Knowledge of disease process/condition, treatment plan, diagnostic tests, and medications will improve  Outcome: PROGRESSING AS EXPECTED  Intervention: Assess knowledge level of disease process/condition, treatment plan, diagnostic tests, and medications  Note: Pt able to verbalize basic understanding of disease process.      Problem: Urinary Elimination:  Goal: Ability to reestablish a normal urinary elimination pattern will improve  Outcome: PROGRESSING SLOWER THAN EXPECTED  Flowsheets (Taken 7/26/2020 0800)  Urinary Elimination: Catheter (Document on LDA)  Note: Catheter to remain in place per MD order

## 2020-07-26 NOTE — PROGRESS NOTES
Note to reader: this note follows the APSO format rather than the historical SOAP format. Assessment and plan located at the top of the note for ease of use.    Chief Complaint  69 y.o. 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 Consider removal once has vac placed.   Antibiotics per Hospital team pending cultures  Will continue to follow to assess need for further debridement.      Case discussed with patient, RN, Hospitalist and Urology-Dr. Marky PUENTES  Patient seen and examined    7/26. POD #1. Feeling better than yesterday. Pain present in lower abdomen and scrotum but improved. Cultures pending. On Zosyn and Clindamycin. No fevers. WBC improved. To transfer to floor today.            Review of Systems  Physical Exam   Review of Systems   Constitutional: Negative for chills, diaphoresis and fever.   Gastrointestinal: Positive for abdominal pain.   Genitourinary: Negative for hematuria.     Vitals:    07/26/20 0800 07/26/20 0900 07/26/20 1000 07/26/20 1100   BP: 112/68   114/69   Pulse: 75 78 74 83   Resp: 14 12 (!) 10 20   Temp: 36.2 °C (97.2 °F)      TempSrc: Temporal      SpO2: 97% 98% 95% 96%   Weight:       Height:         Physical Exam   Constitutional: He is oriented to person, place, and time and well-developed, well-nourished, and in no distress. No distress.   Pulmonary/Chest: Effort normal.   Abdominal: Soft. He exhibits no distension.   Some tenderness in the RLQ   Genitourinary:    Genitourinary Comments: Post surgical incision right hemiscrotum with loose sutures in place. Packing and penrose drain within. Serosanguinous saturation of overlying bandage. Scrotum is diffusely erythematous, swollen and indurated extending high into the right inguinal canal. No areas of necrosis.     Go draining clear.      Neurological: He is alert and oriented to person, place, and time.   Skin: Skin is warm and dry.   Psychiatric: Affect and judgment normal.   Nursing note and vitals reviewed.       Hematology Chemistry   Lab Results   Component Value Date/Time    WBC 11.0 (H) 07/26/2020 05:05 AM    HEMOGLOBIN 11.2 (L) 07/26/2020 05:05 AM    HEMATOCRIT 35.3 (L) 07/26/2020 05:05 AM    PLATELETCT 247 07/26/2020 05:05 AM     Lab Results   Component Value Date/Time    SODIUM 138 07/26/2020 05:05 AM    POTASSIUM 3.7 07/26/2020 05:05 AM    CHLORIDE 106 07/26/2020 05:05 AM    CO2 24 07/26/2020 05:05 AM    GLUCOSE 109 (H) 07/26/2020 05:05 AM    BUN 17 07/26/2020 05:05 AM    CREATININE 0.53 07/26/2020 05:05 AM         Labs not explicitly included in this progress note were reviewed by the author.   Radiology/imaging not explicitly included in this progress note was reviewed by the author.     Medications reviewed and Labs reviewed

## 2020-07-26 NOTE — ANESTHESIA TIME REPORT
Anesthesia Start and Stop Event Times     Date Time Event    7/25/2020 1926 Anesthesia Start     2100 Anesthesia Stop        Responsible Staff  07/25/20    Name Role Begin End    Ashwin Morfin M.D. Anesth 1926 2100        Preop Diagnosis (Free Text):  Pre-op Diagnosis     Scrotal Abscess        Preop Diagnosis (Codes):    Post op Diagnosis  Landon's gangrene      Premium Reason  E. Weekend    Comments: Emergency; radial arterial line with ultrasound

## 2020-07-26 NOTE — CONSULTS
DATE OF SERVICE:  07/25/2020    UROLOGIC EMERGENCY ROOM CONSULTATION    CHIEF COMPLAINT:  Testicular pain and chest pain.    BRIEF HISTORY:  This 69-year-old male presented 2 weeks ago and was admitted   to the hospital with a reaction to an antibiotic.  He does have a history in   2009 of SLE diagnosed then and treated with Imuran and prednisone.  He is   followed by Dr. Renner for coronary artery disease, coronary stent placement,   paroxysmal atrial fibrillation, on Eliquis, hypertension, BPH, and leg and   buttocks rash, is followed by dermatology.  He has had cysts on his back that   have required incision and drainage and he has type 1 diabetes, poorly   controlled.  He has polycythemia, on hydroxyurea.  He noticed a cyst in his   scrotum several weeks ago that drained spontaneously.  That did get better,   but 3 days ago, he started noticing increasing swelling in the right   hemiscrotum and ended up presenting to the operating room today with some   hypotension.  At that time, exam did show a markedly swollen right hemiscrotum   that was red, edematous with this redness going up over the inguinal canal.    Concern was for Landon's gangrene.  He has also had some intermittent chest   pain.    REVIEW OF SYSTEMS:  Significant without fever or chills.  No cough or cold   symptoms.  He has had no vomiting or diarrhea.  He has had symptoms, were all   related to this chest discomfort but also mainly to this scrotal edema and   pain for the last 3 days.    PAST MEDICAL HISTORY:  Significant for diabetes, history of myocardial   infarction 3 years ago, ischemic cardiomyopathy, kidney stones, orthostatic   hypotension, polycythemia vera.  He had a stroke in 2016.    PAST SURGICAL HISTORY:  Significant for cardiac surgery with laminotomy,   appendectomy, carotid endarterectomy.    SOCIAL HISTORY:  He has not smoked or used smokeless tobacco.  He does not   drink or use drugs.    ALLERGIES:  DOXYCYCLINE, BETA  BLOCKERS, METFORMIN, SIMVASTATIN, AND STATINS.    MEDICATIONS:  Include insulin 200 units per mL, 18 units every evening.  He   uses Eliquis 5 mg b.i.d., aspirin 81 mg a day, Imuran 50 mg 2 times a day,   finasteride 1 tablet a day, hydroxyurea 500 mg every day, ProAmatine 5 mg 3   pills once a day, Naprosyn 750 mg b.i.d., omeprazole 20 mg a day, prednisone   40 mg a day, Flomax 0.4 mg a day, and multivitamin.    PHYSICAL EXAMINATION:  VITAL SIGNS:  Showed patient to be afebrile, pulse was 86, respirations 22,   blood pressure is 142/73, pulse oximetry 96.  CONSTITUTIONAL:  This is an ill-appearing man in mild distress.  HEAD, EYES, EARS, NOSE, THROAT:  Grossly unremarkable.  CHEST:  Normal respiratory excursion.  ABDOMEN:  Soft.  No distention.  Bowel sounds are normal.  No guarding, no   rebound.  GENITALIA:  Shows a markedly swollen right scrotum greater than 10 cm with   marked erythema and erythema going up over the right inguinal canal.  In the   right groin, there is a tinea cruris.  Left testicle felt normal.    LABORATORY DATA:  Show white count of 14,400 with a left shift.  Electrolytes   are fairly normal.  Sodium is low at 132.  Glucose 479, BUN 28, creatinine   0.89.  Liver enzymes are also somewhat elevated, alkaline phosphatase 138.    INR is 1.59.    PLAN:  At this point, patient has been given FFP to counteract the Eliquis.    At this point, I recommend taking him fairly emergent to the operating room   for incision and drainage of this right scrotal abscess.  A CT scan did show   air and edema in the right hemiscrotum but no evidence of an abscess.  At this   point, he will be admitted to the ICU by the hospitalist and intensivist for   observation.  We will follow, and plan to take him to the operating room   acutely and do an incision and drainage and debridement of the right scrotum.    More than likely will have an open wound and probably change out the pack in   2 days.  We will have the wound  team evaluate him for a vacuum suction device   for wound care.  At this point, we will follow him in consultation.       ____________________________________     MD JUN BOWIE / DEANNA    DD:  07/25/2020 22:38:37  DT:  07/26/2020 01:59:43    D#:  [id number]  Job#:  [id number]

## 2020-07-26 NOTE — OR NURSING
2055:  To PACU from OR via bed, Pt RR even an unlabored.  Dressing on groin, CD&I, covered with OR under burden.  Dr. Morfin at bedside.  Bld sugar taken and results given to MD.  No orders received.  Art line dressing CD&I.  Infusing without difficulty.  Zero and waveform obtain.  NIBP taken also and both BP given to Dr. Morfin.  No orders received.      2110:  Pt responds to stimulation, groggy and mumbles answers.  No c/o pain at this time.  Meets criteria to transfered to ICU.     2130:  Pt transfered to ICU.  No change in condition or in surgical site assessment.

## 2020-07-26 NOTE — ANESTHESIA PREPROCEDURE EVALUATION
Severely ill man with Landon's gangrene, uncontrolled diabetes, high dose steroid use for SLE, and CAD. Ischemic cardiomyopathy with EF 30%.     Discussed critical illness and risks. Discussed likely possibility of requiring intubation and ICU admission. Will start with LMA but transition to ETT if patient decompensates.     Relevant Problems   NEURO   (+) History of ST elevation myocardial infarction (STEMI)   (+) History of stroke- old right parietal/occipital      CARDIAC   (+) CAD (coronary artery disease)   (+) Carotid stenosis, 90% right carotid   (+) Paroxysmal A-fib (HCC)   (+) STEMI (ST elevation myocardial infarction) (HCC)   (+) Stenosis of right carotid artery-Right CEA 3/21/18      ENDO   (+) Type 2 diabetes mellitus, with long-term current use of insulin ([facility])      Other   (+) Polycythemia vera ([facility])       Physical Exam    Airway   Mallampati: II  TM distance: >3 FB  Neck ROM: full       Cardiovascular - normal exam  Rhythm: regular  Rate: normal  (-) murmur     Dental - normal exam           Pulmonary - normal exam  Breath sounds clear to auscultation     Abdominal    Neurological - normal exam                 Anesthesia Plan    ASA 4- EMERGENT   ASA physical status 4 criteria: sepsisASA physical status emergent criteria: compromised vital organ, limb or tissue    Plan - general       Airway plan will be LMA        Induction: intravenous    Postoperative Plan: Postoperative administration of opioids is intended.    Pertinent diagnostic labs and testing reviewed    Informed Consent:    Anesthetic plan and risks discussed with patient.    Use of blood products discussed with: patient whom consented to blood products.

## 2020-07-26 NOTE — OR SURGEON
Immediate Post OP Note    PreOp Diagnosis: Ifornier's gangrene of right hemiscrotum    PostOp Diagnosis: same    Procedure(s):  INCISION AND DRAINAGE and debridement of right hemiscrotum SCROTUM - Wound Class: Dirty or Infected    Surgeon(s):  Nick Negro M.D.    Anesthesiologist/Type of Anesthesia:  Anesthesiologist: Ashwin Morfin M.D./General    Surgical Staff:  Circulator: Callie Samano R.N.  Limb Perry: Elbert Armenta  Scrub Person: Martin Art    Specimens removed if any:  ID Type Source Tests Collected by Time Destination   1 : SCROTAL ABSCESS Other Other FUNGAL CULTURE, AEROBIC/ANAEROBIC CULTURE (SURGERY) Nick Negro M.D. 7/25/2020 2030        Estimated Blood Loss: 60 cc    Findings: Moderate dead necrotic tissue with intact testicle and extension up inguinal canal    Complications: guarded to PACU.        7/25/2020 9:06 PM Nick Negro M.D.

## 2020-07-26 NOTE — PROGRESS NOTES
Report called to JOSLYN Delacruz, for Pt to transfer to [facility] 215 bed 1.

## 2020-07-26 NOTE — OP REPORT
DATE OF SERVICE:  07/25/2020    PREOPERATIVE DIAGNOSIS:  Landon's gangrene of right scrotum.    POSTOPERATIVE DIAGNOSIS:  Landon's gangrene of right scrotum.    PROCEDURE:  Incision, drainage, and debridement of right scrotum.    ANESTHESIA:  General.    ANESTHESIOLOGIST:  Ashwin Morfin MD    SURGEON:  Nick Negro MD    BRIEF HISTORY:  This 69-year-old male with type 1 diabetes and multiple   medical problems.  Apparently, he was in the hospital several weeks ago with a   reaction to a drug.  At that time, he was found to have glucose to be out of   control.  Eventually, he was able to be discharged home, but he had what he   describes as a boil on his scrotum that started draining purulent fluid and   then finally broke.  He thought it went away, but then approximately 3 days   ago, he started noticing increasing swelling and pain on the right   hemiscrotum.  This got worse to the point that he came to the emergency room   today.  In the emergency room, it was felt that he had a Landon's gangrene   of the scrotum.  A CT scan showed a large amount of gas in the right scrotum   going up into the right inguinal canal.  At this point, he is brought to the   operating room for incision and drainage of this.    REPORT OF OPERATION:  Under general anesthesia with the patient in the   lithotomy position, the groin and scrotum were shaved, prepped and draped in   sterile manner.  At this point, a 16-[place] Andres catheter was placed into the   bladder without difficulty.  Urine drainage was clear.  A vertical incision   was made over the right hemiscrotum.  Initially, this was just approximately 4   cm in length through the skin, but once down, was able to see that the   purulent fluid did drain from this incision.  With an index finger, I was able   to dissect out under the skin and opened the incision further.  Eventually,   the incision was opened to approximately 10-12 cm in length in a vertical    manner.  On the very posterior of the scrotum on the right side, there was a   dimpling suggestive of this was where his prior site of infection was.  At   this point, I was able to open the wound up completely.  I got around the   testicle circumferentially and around the cord.  I was able to dissect away up   and along the cord up to the inguinal canal to the internal ring.  I did   dissect all the way around the testicle, freeing it up and several side   pockets.  I did dissect down posterior into the posterior aspects of the   scrotum down along this old drainage site.  I did get even deeper towards the   rectum, but this did not communicate with the rectum, and there is no evidence   of any inflammation around the rectum.  The wound was irrigated with saline.    At this point, there was some necrotic tissue that was debrided with sharp   and blunt dissection.  Eventually, I was able to get all the way around this   area and examined closely.  Several areas of necrotic tissue were down in this   deep pocket posterior to the testicle.  Exam of the inguinal canal showed   really minimal-to-no necrotic tissue.  At this point, the pulse wound   irrigation system was brought up and this was a Pulsavac system.  I did do   irrigation of the entire area with 3 L of saline.  I irrigated up in the   inguinal canal and this all did clear things up quite well, so that I really   did not elicit any more purulent drainage from throughout the whole area.  I   did gently dissect into the median raphe area between the left and right   hemiscrotum and this tissue was all normal in appearance.  At this point, once   I felt that this had been well irrigated and that all dead necrotic tissue   had been removed, I went ahead and used 1 inch iodoform packing up into the   inguinal canal circumferentially around the cord.  This was then packed down   deep into the scrotum and around the testicle circumferentially.  At this   point,  once this whole role of iodoform was used, I then went to a 2-inch   Clifford dressing and finished up the packing with that.  An 0 Prolene was then   used to reapproximate the edges of the skin loosely and this was more to hold   the dressing in place.  I had also placed a quarter-inch Penrose from the one   drain site, posterior into the scrotum and this had come around through the   main part of the wound so to help with dependent drainage from this area.  At   this point, 20 mL of 0.25% plain Marcaine was then instilled into the skin   edges for postoperative pain control.  The patient at this point was then   cleaned up and ABD dressings placed over the wound and a panty dressing gómez   was then placed on the patient.  He was then transferred to the PACU in   stable condition and extubated.       ____________________________________     MD JUN BOWIE / DEANNA    DD:  07/25/2020 22:46:03  DT:  07/26/2020 00:46:25    D#:  [id number]  Job#:  [id number]

## 2020-07-26 NOTE — PROGRESS NOTES
Pt transferred in bed, on oxygen, with all belongings, chart, and medications to 215 bed 1 without incident. VS before transfer as charted at 1100, see flowsheets.

## 2020-07-26 NOTE — ANESTHESIA QCDR
2019 [facility] Clinical Data Registry (for Quality Improvement)     Postoperative nausea/vomiting risk protocol (Adult = 18 yrs and Pediatric 3-17 yrs)- (430 and 463)  General inhalation anesthetic (NOT TIVA) with PONV risk factors: Yes  Provision of anti-emetic therapy with at least 2 different classes of agents: Yes   Patient DID NOT receive anti-emetic therapy and reason is documented in Medical Record:  N/A    Multimodal Pain Management- (477)  Non-emergent surgery AND patient age >= 18: No  Use of Multimodal Pain Management, two or more drugs and/or interventions, NOT including systemic opioids:   Exception: Documented allergy to multiple classes of analgesics:     Smoking Abstinence (404)  Patient is current smoker (cigarette, pipe, e-cig, marijuanna): No  Elective Surgery:   Abstinence instructions provided prior to day of surgery:   Patient abstained from smoking on day of surgery:     Pre-Op Beta-Blocker in Isolated CABG (44)  Isolated CABG AND patient age >= 18: No  Beta-blocker admin within 24 hours of surgical incision:   Exception:of medical reason(s) for not administering beta blocker within 24 hours prior to surgical incision (e.g., not  indicated,other medical reason):     PACU assessment of acute postoperative pain prior to Anesthesia Care End- Applies to Patients Age = 18- (ABG7)  Initial PACU pain score is which of the following: < 7/10  Patient unable to report pain score: N/A    Post-anesthetic transfer of care checklist/protocol to PACU/ICU- (426 and 427)  Upon conclusion of case, patient transferred to which of the following locations: PACU/Non-ICU  Use of transfer checklist/protocol: Yes  Exclusion: Service Performed in Patient Hospital Room (and thus did not require transfer): N/A  Unplanned admission to ICU related to anesthesia service up through end of PACU care- (MD51)  Unplanned admission to ICU (not initially anticipated at anesthesia start time): No

## 2020-07-26 NOTE — PROGRESS NOTES
2 RN skin assessment done; skin not WDL. See Wound flowsheet.  Redness/weeping to scrotal area. Redness to buttocks, area of redness to upper back.

## 2020-07-26 NOTE — PROGRESS NOTES
Pt arrival from ED, assisted to stand transfer to bed. POC discussed, daughter at bedside. Additional IV access obtained, antibiotics, FFP and insulin gtt infusions started per orders. Pt alert and oriented x 4 and is instructed to notify RN with any and all needs and agrees. Medicated per MAR with morphine for scrotal pain.

## 2020-07-26 NOTE — ANESTHESIA PROCEDURE NOTES
Arterial Line  Performed by: Ashwin Morfin M.D.  Authorized by: Ashwin Morfin M.D.     Start Time:  7/25/2020 7:47 PM  End Time:  7/25/2020 7:48 PM  Localization: ultrasound guidance  Image captured, interpreted and electronically stored.    Patient Location:  OR  Indication: continuous blood pressure monitoring        Catheter Size:  20 G  Seldinger Technique?: Yes    Laterality:  Right  Site:  Radial artery  Line Secured:  Antimicrobial disc, tape and transparent dressing  Events: patient tolerated procedure well with no complications

## 2020-07-26 NOTE — CARE PLAN
Problem: Pain Management  Goal: Pain level will decrease to patient's comfort goal  Outcome: PROGRESSING AS EXPECTED     Problem: Safety  Goal: Will remain free from injury  Outcome: PROGRESSING AS EXPECTED     Problem: Safety  Goal: Will remain free from falls  Outcome: PROGRESSING AS EXPECTED

## 2020-07-26 NOTE — PROGRESS NOTES
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 male who presented 7/25/2020 with hx of SLE dx > 2019 on imuran & prednisone Followed by Dr. 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  Has had a rash on his inner thigh that he is following up with Dermatology    Went to the OR last night (Dr Power) for I and D of right hemiscrotum.  Off insulin drip  Did not need pressors      Interval Problem Update  Reviewed last 24 hour events:  As above  Pt feeling better  Received 2 Units FFP preop as pt was on eloquis    Review of Systems  Review of Systems   Constitutional: Positive for malaise/fatigue.   HENT: Negative.    Eyes: Negative.    Respiratory: Negative.    Cardiovascular: Negative.    Gastrointestinal: Negative.    Genitourinary:        Scrotal pain   Musculoskeletal: Negative.    Skin: Negative.    Neurological: Negative.    Endo/Heme/Allergies: Negative.    Psychiatric/Behavioral: Negative.         Vital Signs for last 24 hours   Temp:  [36.1 °C (97 °F)-37.1 °C (98.7 °F)] 36.2 °C (97.2 °F)  Pulse:  [] 83  Resp:  [10-51] 20  BP: ()/(60-92) 114/69  SpO2:  [91 %-100 %] 96 %       Physical Exam   Physical Exam  Constitutional:       Appearance: He is ill-appearing.   HENT:      Head: Normocephalic and atraumatic.      Nose: Nose normal.      Mouth/Throat:      Mouth: Mucous membranes are moist.   Eyes:      Extraocular Movements: Extraocular movements intact.      Pupils: Pupils are equal, round, and reactive to light.   Neck:      Musculoskeletal: Normal range of motion.   Cardiovascular:      Rate and Rhythm: Normal rate.   Pulmonary:       Effort: Pulmonary effort is normal.   Abdominal:      General: Abdomen is flat. Bowel sounds are normal.      Palpations: Abdomen is soft.   Genitourinary:     Comments: Marked decrease in scrotal swelling  Incision open and packed tender still in the groin area  Musculoskeletal: Normal range of motion.   Skin:     General: Skin is warm.   Neurological:      General: No focal deficit present.      Mental Status: He is alert and oriented to person, place, and time. Mental status is at baseline.   Psychiatric:         Mood and Affect: Mood normal.         Behavior: Behavior normal.         Thought Content: Thought content normal.         Judgment: Judgment normal.         Medications  Current Facility-Administered Medications   Medication Dose Route Frequency Provider Last Rate Last Dose   • insulin regular (HumuLIN R,NovoLIN R) injection  2-10 Units Subcutaneous 4X/DAY MYKE Knight M.D.   Stopped at 07/26/20 0700   • omeprazole (PRILOSEC) capsule 20 mg  20 mg Oral DAILY Dre Rosenbaum M.D.   20 mg at 07/26/20 1054   • insulin glargine (Lantus) injection  18 Units Subcutaneous Q EVENING Dre Rosenbaum M.D.       • HYDROmorphone pf (DILAUDID) injection 1 mg  1 mg Intravenous Q30 MIN PRN Benny Anderson M.D.   1 mg at 07/26/20 0756   • lactated ringers infusion (BOLUS): BMI greater than 30  30 mL/kg (Ideal) Intravenous Once PRN Dre Rosenbaum M.D.       • senna-docusate (PERICOLACE or SENOKOT S) 8.6-50 MG per tablet 2 Tab  2 Tab Oral BID Dre Rosenbaum M.D.   2 Tab at 07/26/20 0630    And   • polyethylene glycol/lytes (MIRALAX) PACKET 1 Packet  1 Packet Oral QDAY PRN Dre Rosenbaum M.D.        And   • magnesium hydroxide (MILK OF MAGNESIA) suspension 30 mL  30 mL Oral QDAY PRN Dre Rosenbaum M.D.        And   • bisacodyl (DULCOLAX) suppository 10 mg  10 mg Rectal QDAY PRN Dre Rosenbaum M.D.       • ondansetron (ZOFRAN) syringe/vial injection 4 mg  4 mg  Intravenous Q4HRS PRSENDY Rosenbaum M.D.       • ondansetron (ZOFRAN ODT) dispertab 4 mg  4 mg Oral Q4HRS PRSENDY Rosenbaum M.D.       • HYDROmorphone pf (DILAUDID) injection 0.5 mg  0.5 mg Intravenous Q3HRS PRSENDY Rosenbaum M.D.       • glucose 4 g chewable tablet 16 g  16 g Oral Q15 MIN PRSENDY Rosenbaum M.D.        And   • dextrose 50% (D50W) injection 50 mL  50 mL Intravenous Q15 MIN PRSENDY Rosenbaum M.D.       • clindamycin (CLEOCIN) IVPB premix 600 mg  600 mg Intravenous Q8HRS Dre Rosenbaum M.D.   Stopped at 07/26/20 0533   • piperacillin-tazobactam (ZOSYN) 4.5 g in  mL IVPB  4.5 g Intravenous Q8HRS Dre Rosenbaum M.D.   Stopped at 07/26/20 0855   • morphine (pf) 4 MG/ML injection 2-4 mg  2-4 mg Intravenous Q2HRS PRSENDY Rosenbaum M.D.   4 mg at 07/26/20 0637       Fluids    Intake/Output Summary (Last 24 hours) at 7/26/2020 1148  Last data filed at 7/26/2020 1000  Gross per 24 hour   Intake 4806.39 ml   Output 2140 ml   Net 2666.39 ml       Laboratory          Recent Labs     07/25/20  1400 07/26/20  0058 07/26/20  0505   SODIUM 132*  --  138   POTASSIUM 3.7 3.9 3.7   CHLORIDE 96  --  106   CO2 23  --  24   BUN 28*  --  17   CREATININE 0.89  --  0.53   CALCIUM 8.5  --  7.9*     Recent Labs     07/25/20  1400 07/26/20  0505   ALTSGPT 7 7   ASTSGOT 10* 11*   ALKPHOSPHAT 138* 73   TBILIRUBIN 0.8 0.6   GLUCOSE 479* 109*     Recent Labs     07/25/20  1400 07/26/20  0505   WBC 14.4* 11.0*   NEUTSPOLYS 93.60*  --    LYMPHOCYTES 1.50*  --    MONOCYTES 2.70  --    EOSINOPHILS 0.20  --    BASOPHILS 0.40  --    ASTSGOT 10* 11*   ALTSGPT 7 7   ALKPHOSPHAT 138* 73   TBILIRUBIN 0.8 0.6     Recent Labs     07/25/20  1400 07/26/20  0505   RBC 5.23 4.34*   HEMOGLOBIN 13.3* 11.2*   HEMATOCRIT 43.2 35.3*   PLATELETCT 255 247   PROTHROMBTM 19.3*  --    INR 1.59*  --        Imaging  No new imaging    Assessment/Plan  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 Discussed with urology and they are aware  Pt and daughter aware too        Thrombocytopenia ([facility])- (present on admission)  Assessment & Plan  On hydroxyurea as outpt but anemic here so on hold    CAD (coronary artery disease)- (present on admission)  Assessment & Plan  ASA being held incase needs to return to OR    Type 2 diabetes mellitus, with long-term current use of insulin ([facility])- (present on admission)  Assessment & Plan  Tresiba 18 units q pm  Diabetic diet  Glucose better controlled    Paroxysmal A-fib ([facility])- (present on admission)  Assessment & Plan  Sinus here  eloquis on hold post op and lovenox subq for DVT restart tonight  No oozing from packing  Platelets normal    Pancreatic cyst  Assessment & Plan  Tail of pancreas  Will need follow up outpt    SLE (systemic lupus erythematosus related syndrome) ([facility])  Assessment & Plan  High risk of flare  His complements are always low so wont send  Hold imuran and steroids (see above on steroids)     At this time stable to transfer to the floor    VTE:  Lovenox  Ulcer: PPI  Lines: None    I have performed a physical exam and reviewed and updated ROS and Plan today (7/26/2020). In review of yesterday's note (7/25/2020), there are no changes except as documented above.     Discussed patient with urology team

## 2020-07-26 NOTE — PROGRESS NOTES
"Dr. Whitmore, internal medicine, and Urology MD at bedside assessing patient.  Urology inspecting groin incision, serous drainage, large, overnight, sutures remain intact; urology changing dressing with this RN, states \"wound care to place woundvac tomorrow.\"  Dr. Whitmore gives orders to remove arterial line and transfer patient to [facility].    "

## 2020-07-27 LAB
BACTERIA UR CULT: ABNORMAL
BACTERIA UR CULT: ABNORMAL
GLUCOSE BLD-MCNC: 119 MG/DL (ref 65–99)
GLUCOSE BLD-MCNC: 125 MG/DL (ref 65–99)
GLUCOSE BLD-MCNC: 149 MG/DL (ref 65–99)
GLUCOSE BLD-MCNC: 169 MG/DL (ref 65–99)
GRAM STN SPEC: NORMAL
SIGNIFICANT IND 70042: ABNORMAL
SIGNIFICANT IND 70042: NORMAL
SITE SITE: ABNORMAL
SITE SITE: NORMAL
SOURCE SOURCE: ABNORMAL
SOURCE SOURCE: NORMAL

## 2020-07-27 PROCEDURE — A9270 NON-COVERED ITEM OR SERVICE: HCPCS | Performed by: INTERNAL MEDICINE

## 2020-07-27 PROCEDURE — 700105 HCHG RX REV CODE 258: Performed by: INTERNAL MEDICINE

## 2020-07-27 PROCEDURE — 700111 HCHG RX REV CODE 636 W/ 250 OVERRIDE (IP): Performed by: INTERNAL MEDICINE

## 2020-07-27 PROCEDURE — 700101 HCHG RX REV CODE 250: Performed by: INTERNAL MEDICINE

## 2020-07-27 PROCEDURE — 700111 HCHG RX REV CODE 636 W/ 250 OVERRIDE (IP): Performed by: HOSPITALIST

## 2020-07-27 PROCEDURE — 99232 SBSQ HOSP IP/OBS MODERATE 35: CPT | Performed by: INTERNAL MEDICINE

## 2020-07-27 PROCEDURE — 97605 NEG PRS WND THER DME<=50SQCM: CPT

## 2020-07-27 PROCEDURE — 700102 HCHG RX REV CODE 250 W/ 637 OVERRIDE(OP): Performed by: INTERNAL MEDICINE

## 2020-07-27 PROCEDURE — 82962 GLUCOSE BLOOD TEST: CPT

## 2020-07-27 PROCEDURE — 770006 HCHG ROOM/CARE - MED/SURG/GYN SEMI*

## 2020-07-27 RX ORDER — KETOROLAC TROMETHAMINE 30 MG/ML
15 INJECTION, SOLUTION INTRAMUSCULAR; INTRAVENOUS EVERY 6 HOURS PRN
Status: DISPENSED | OUTPATIENT
Start: 2020-07-27 | End: 2020-08-01

## 2020-07-27 RX ORDER — CLINDAMYCIN HYDROCHLORIDE 150 MG/1
600 CAPSULE ORAL EVERY 8 HOURS
Status: DISCONTINUED | OUTPATIENT
Start: 2020-07-27 | End: 2020-07-28

## 2020-07-27 RX ADMIN — OMEPRAZOLE 20 MG: 20 CAPSULE, DELAYED RELEASE ORAL at 05:19

## 2020-07-27 RX ADMIN — ENOXAPARIN SODIUM 40 MG: 40 INJECTION SUBCUTANEOUS at 05:19

## 2020-07-27 RX ADMIN — CLINDAMYCIN IN 5 PERCENT DEXTROSE 600 MG: 12 INJECTION, SOLUTION INTRAVENOUS at 05:18

## 2020-07-27 RX ADMIN — INSULIN HUMAN 2 UNITS: 100 INJECTION, SOLUTION PARENTERAL at 12:12

## 2020-07-27 RX ADMIN — MORPHINE SULFATE 4 MG: 4 INJECTION INTRAVENOUS at 14:37

## 2020-07-27 RX ADMIN — HYDROMORPHONE HYDROCHLORIDE 1 MG: 1 INJECTION, SOLUTION INTRAMUSCULAR; INTRAVENOUS; SUBCUTANEOUS at 03:28

## 2020-07-27 RX ADMIN — MORPHINE SULFATE 4 MG: 4 INJECTION INTRAVENOUS at 12:38

## 2020-07-27 RX ADMIN — HYDROMORPHONE HYDROCHLORIDE 1 MG: 1 INJECTION, SOLUTION INTRAMUSCULAR; INTRAVENOUS; SUBCUTANEOUS at 17:29

## 2020-07-27 RX ADMIN — HYDROMORPHONE HYDROCHLORIDE 1 MG: 1 INJECTION, SOLUTION INTRAMUSCULAR; INTRAVENOUS; SUBCUTANEOUS at 00:19

## 2020-07-27 RX ADMIN — MORPHINE SULFATE 4 MG: 4 INJECTION INTRAVENOUS at 10:31

## 2020-07-27 RX ADMIN — PIPERACILLIN AND TAZOBACTAM 4.5 G: 4; .5 INJECTION, POWDER, LYOPHILIZED, FOR SOLUTION INTRAVENOUS; PARENTERAL at 21:12

## 2020-07-27 RX ADMIN — INSULIN GLARGINE 18 UNITS: 100 INJECTION, SOLUTION SUBCUTANEOUS at 18:47

## 2020-07-27 RX ADMIN — KETOROLAC TROMETHAMINE 15 MG: 30 INJECTION, SOLUTION INTRAMUSCULAR at 23:16

## 2020-07-27 RX ADMIN — MORPHINE SULFATE 4 MG: 4 INJECTION INTRAVENOUS at 05:29

## 2020-07-27 RX ADMIN — MORPHINE SULFATE 4 MG: 4 INJECTION INTRAVENOUS at 16:37

## 2020-07-27 RX ADMIN — CLINDAMYCIN HYDROCHLORIDE 600 MG: 150 CAPSULE ORAL at 22:22

## 2020-07-27 RX ADMIN — MORPHINE SULFATE 4 MG: 4 INJECTION INTRAVENOUS at 18:45

## 2020-07-27 RX ADMIN — SENNOSIDES-DOCUSATE SODIUM TAB 8.6-50 MG 2 TABLET: 8.6-5 TAB at 05:19

## 2020-07-27 RX ADMIN — PIPERACILLIN AND TAZOBACTAM 4.5 G: 4; .5 INJECTION, POWDER, LYOPHILIZED, FOR SOLUTION INTRAVENOUS; PARENTERAL at 14:37

## 2020-07-27 RX ADMIN — PIPERACILLIN AND TAZOBACTAM 4.5 G: 4; .5 INJECTION, POWDER, LYOPHILIZED, FOR SOLUTION INTRAVENOUS; PARENTERAL at 05:18

## 2020-07-27 RX ADMIN — ONDANSETRON 4 MG: 2 INJECTION INTRAMUSCULAR; INTRAVENOUS at 23:32

## 2020-07-27 RX ADMIN — CLINDAMYCIN IN 5 PERCENT DEXTROSE 600 MG: 12 INJECTION, SOLUTION INTRAVENOUS at 14:41

## 2020-07-27 RX ADMIN — INSULIN HUMAN 2 UNITS: 100 INJECTION, SOLUTION PARENTERAL at 18:46

## 2020-07-27 ASSESSMENT — ENCOUNTER SYMPTOMS
GASTROINTESTINAL NEGATIVE: 1
CARDIOVASCULAR NEGATIVE: 1
MUSCULOSKELETAL NEGATIVE: 1
RESPIRATORY NEGATIVE: 1
NEUROLOGICAL NEGATIVE: 1
PSYCHIATRIC NEGATIVE: 1
EYES NEGATIVE: 1

## 2020-07-27 NOTE — CARE PLAN
Problem: Fluid Volume:  Goal: Will maintain balanced intake and output  Outcome: PROGRESSING AS EXPECTED     Problem: Communication  Goal: The ability to communicate needs accurately and effectively will improve  Outcome: PROGRESSING AS EXPECTED     Problem: Safety  Goal: Will remain free from injury  Outcome: PROGRESSING AS EXPECTED     Problem: Infection  Goal: Will remain free from infection  Outcome: PROGRESSING AS EXPECTED

## 2020-07-27 NOTE — CARE PLAN
Received report from day shift nurse.   Assumed pt care @ approximately 1900.  Pt is A&Ox4, resting comfortably in bed.   Pt on 2L nasal canula. No signs of SOB/respiratory distress.   Labs noted, VSS.   Pt c/o 8/10 pain at this moment.   Assessment completed, surgical dressing present on scrotum intact with moderate amount of serosanguinous drainage, generalized swelling present on scrotum. Pt appears to be fatigued. Continuing prescribed treatment of antibiotics.   Fall precautions in place.   Bed at lowest position.   Call light and personal belongings within reach. Continue to monitor         Problem: Pain Management  Goal: Pain level will decrease to patient's comfort goal  Outcome: PROGRESSING AS EXPECTED     Problem: Safety  Goal: Will remain free from falls  Outcome: PROGRESSING AS EXPECTED

## 2020-07-28 LAB
ALBUMIN SERPL BCP-MCNC: 1.7 G/DL (ref 3.2–4.9)
ALBUMIN/GLOB SERPL: 0.5 G/DL
ALP SERPL-CCNC: 128 U/L (ref 30–99)
ALT SERPL-CCNC: 7 U/L (ref 2–50)
ANION GAP SERPL CALC-SCNC: 9 MMOL/L (ref 7–16)
AST SERPL-CCNC: 18 U/L (ref 12–45)
BASOPHILS # BLD AUTO: 0.5 % (ref 0–1.8)
BASOPHILS # BLD: 0.06 K/UL (ref 0–0.12)
BILIRUB SERPL-MCNC: 0.8 MG/DL (ref 0.1–1.5)
BUN SERPL-MCNC: 10 MG/DL (ref 8–22)
CALCIUM SERPL-MCNC: 7.8 MG/DL (ref 8.4–10.2)
CHLORIDE SERPL-SCNC: 102 MMOL/L (ref 96–112)
CO2 SERPL-SCNC: 24 MMOL/L (ref 20–33)
CREAT SERPL-MCNC: 0.71 MG/DL (ref 0.5–1.4)
EOSINOPHIL # BLD AUTO: 0.15 K/UL (ref 0–0.51)
EOSINOPHIL NFR BLD: 1.4 % (ref 0–6.9)
ERYTHROCYTE [DISTWIDTH] IN BLOOD BY AUTOMATED COUNT: 56.1 FL (ref 35.9–50)
GLOBULIN SER CALC-MCNC: 3.5 G/DL (ref 1.9–3.5)
GLUCOSE BLD-MCNC: 171 MG/DL (ref 65–99)
GLUCOSE BLD-MCNC: 205 MG/DL (ref 65–99)
GLUCOSE BLD-MCNC: 276 MG/DL (ref 65–99)
GLUCOSE BLD-MCNC: 56 MG/DL (ref 65–99)
GLUCOSE BLD-MCNC: 57 MG/DL (ref 65–99)
GLUCOSE SERPL-MCNC: 56 MG/DL (ref 65–99)
HCT VFR BLD AUTO: 37 % (ref 42–52)
HGB BLD-MCNC: 11.5 G/DL (ref 14–18)
IMM GRANULOCYTES # BLD AUTO: 0.11 K/UL (ref 0–0.11)
IMM GRANULOCYTES NFR BLD AUTO: 1 % (ref 0–0.9)
LYMPHOCYTES # BLD AUTO: 0.6 K/UL (ref 1–4.8)
LYMPHOCYTES NFR BLD: 5.4 % (ref 22–41)
MCH RBC QN AUTO: 26 PG (ref 27–33)
MCHC RBC AUTO-ENTMCNC: 31.1 G/DL (ref 33.7–35.3)
MCV RBC AUTO: 83.7 FL (ref 81.4–97.8)
MONOCYTES # BLD AUTO: 0.54 K/UL (ref 0–0.85)
MONOCYTES NFR BLD AUTO: 4.9 % (ref 0–13.4)
NEUTROPHILS # BLD AUTO: 9.61 K/UL (ref 1.82–7.42)
NEUTROPHILS NFR BLD: 86.8 % (ref 44–72)
NRBC # BLD AUTO: 0 K/UL
NRBC BLD-RTO: 0 /100 WBC
PLATELET # BLD AUTO: 370 K/UL (ref 164–446)
PMV BLD AUTO: 12.3 FL (ref 9–12.9)
POTASSIUM SERPL-SCNC: 3.5 MMOL/L (ref 3.6–5.5)
PREALB SERPL-MCNC: 4.5 MG/DL (ref 18–38)
PROT SERPL-MCNC: 5.2 G/DL (ref 6–8.2)
RBC # BLD AUTO: 4.42 M/UL (ref 4.7–6.1)
SODIUM SERPL-SCNC: 135 MMOL/L (ref 135–145)
WBC # BLD AUTO: 11.1 K/UL (ref 4.8–10.8)

## 2020-07-28 PROCEDURE — 87040 BLOOD CULTURE FOR BACTERIA: CPT | Mod: 91

## 2020-07-28 PROCEDURE — 700102 HCHG RX REV CODE 250 W/ 637 OVERRIDE(OP): Performed by: INTERNAL MEDICINE

## 2020-07-28 PROCEDURE — 99233 SBSQ HOSP IP/OBS HIGH 50: CPT | Performed by: INTERNAL MEDICINE

## 2020-07-28 PROCEDURE — 770006 HCHG ROOM/CARE - MED/SURG/GYN SEMI*

## 2020-07-28 PROCEDURE — 700102 HCHG RX REV CODE 250 W/ 637 OVERRIDE(OP): Performed by: HOSPITALIST

## 2020-07-28 PROCEDURE — 700105 HCHG RX REV CODE 258: Performed by: INTERNAL MEDICINE

## 2020-07-28 PROCEDURE — 82962 GLUCOSE BLOOD TEST: CPT | Mod: 91

## 2020-07-28 PROCEDURE — 80053 COMPREHEN METABOLIC PANEL: CPT

## 2020-07-28 PROCEDURE — 36415 COLL VENOUS BLD VENIPUNCTURE: CPT

## 2020-07-28 PROCEDURE — 87086 URINE CULTURE/COLONY COUNT: CPT

## 2020-07-28 PROCEDURE — A9270 NON-COVERED ITEM OR SERVICE: HCPCS | Performed by: HOSPITALIST

## 2020-07-28 PROCEDURE — 84134 ASSAY OF PREALBUMIN: CPT

## 2020-07-28 PROCEDURE — 85025 COMPLETE CBC W/AUTO DIFF WBC: CPT

## 2020-07-28 PROCEDURE — 700111 HCHG RX REV CODE 636 W/ 250 OVERRIDE (IP): Performed by: INTERNAL MEDICINE

## 2020-07-28 PROCEDURE — A9270 NON-COVERED ITEM OR SERVICE: HCPCS | Performed by: INTERNAL MEDICINE

## 2020-07-28 PROCEDURE — 700101 HCHG RX REV CODE 250: Performed by: INTERNAL MEDICINE

## 2020-07-28 RX ORDER — OXYCODONE HYDROCHLORIDE 10 MG/1
10 TABLET ORAL EVERY 6 HOURS PRN
Status: DISCONTINUED | OUTPATIENT
Start: 2020-07-28 | End: 2020-07-29

## 2020-07-28 RX ORDER — ACETAMINOPHEN 325 MG/1
650 TABLET ORAL EVERY 4 HOURS PRN
Status: DISCONTINUED | OUTPATIENT
Start: 2020-07-28 | End: 2020-07-28

## 2020-07-28 RX ORDER — MORPHINE SULFATE 4 MG/ML
2 INJECTION, SOLUTION INTRAMUSCULAR; INTRAVENOUS
Status: DISCONTINUED | OUTPATIENT
Start: 2020-07-28 | End: 2020-08-01

## 2020-07-28 RX ORDER — INSULIN GLARGINE 100 [IU]/ML
15 INJECTION, SOLUTION SUBCUTANEOUS EVERY EVENING
Status: DISCONTINUED | OUTPATIENT
Start: 2020-07-28 | End: 2020-07-29

## 2020-07-28 RX ORDER — ACETAMINOPHEN 10 MG/ML
1000 INJECTION, SOLUTION INTRAVENOUS EVERY 8 HOURS
Status: DISCONTINUED | OUTPATIENT
Start: 2020-07-28 | End: 2020-07-28

## 2020-07-28 RX ORDER — ACETAMINOPHEN 10 MG/ML
1000 INJECTION, SOLUTION INTRAVENOUS EVERY 8 HOURS
Status: DISCONTINUED | OUTPATIENT
Start: 2020-07-28 | End: 2020-07-29

## 2020-07-28 RX ADMIN — MORPHINE SULFATE 2 MG: 4 INJECTION INTRAVENOUS at 09:02

## 2020-07-28 RX ADMIN — SENNOSIDES-DOCUSATE SODIUM TAB 8.6-50 MG 2 TABLET: 8.6-5 TAB at 17:12

## 2020-07-28 RX ADMIN — MORPHINE SULFATE 2 MG: 4 INJECTION INTRAVENOUS at 19:54

## 2020-07-28 RX ADMIN — OMEPRAZOLE 20 MG: 20 CAPSULE, DELAYED RELEASE ORAL at 05:07

## 2020-07-28 RX ADMIN — AMPICILLIN SODIUM AND SULBACTAM SODIUM 3 G: 2; 1 INJECTION, POWDER, FOR SOLUTION INTRAMUSCULAR; INTRAVENOUS at 12:00

## 2020-07-28 RX ADMIN — KETOROLAC TROMETHAMINE 15 MG: 30 INJECTION, SOLUTION INTRAMUSCULAR at 11:22

## 2020-07-28 RX ADMIN — AMPICILLIN SODIUM AND SULBACTAM SODIUM 3 G: 2; 1 INJECTION, POWDER, FOR SOLUTION INTRAMUSCULAR; INTRAVENOUS at 23:19

## 2020-07-28 RX ADMIN — INSULIN HUMAN 2 UNITS: 100 INJECTION, SOLUTION PARENTERAL at 16:47

## 2020-07-28 RX ADMIN — PIPERACILLIN AND TAZOBACTAM 4.5 G: 4; .5 INJECTION, POWDER, LYOPHILIZED, FOR SOLUTION INTRAVENOUS; PARENTERAL at 04:36

## 2020-07-28 RX ADMIN — OXYCODONE HYDROCHLORIDE 10 MG: 10 TABLET ORAL at 09:21

## 2020-07-28 RX ADMIN — ENOXAPARIN SODIUM 40 MG: 40 INJECTION SUBCUTANEOUS at 05:08

## 2020-07-28 RX ADMIN — OXYCODONE HYDROCHLORIDE 10 MG: 10 TABLET ORAL at 17:12

## 2020-07-28 RX ADMIN — INSULIN HUMAN 2 UNITS: 100 INJECTION, SOLUTION PARENTERAL at 10:47

## 2020-07-28 RX ADMIN — INSULIN HUMAN 4 UNITS: 100 INJECTION, SOLUTION PARENTERAL at 20:51

## 2020-07-28 RX ADMIN — INSULIN GLARGINE 15 UNITS: 100 INJECTION, SOLUTION SUBCUTANEOUS at 17:48

## 2020-07-28 RX ADMIN — ACETAMINOPHEN 1000 MG: 10 INJECTION, SOLUTION INTRAVENOUS at 10:05

## 2020-07-28 RX ADMIN — MORPHINE SULFATE 2 MG: 4 INJECTION INTRAVENOUS at 15:36

## 2020-07-28 RX ADMIN — CLINDAMYCIN HYDROCHLORIDE 600 MG: 150 CAPSULE ORAL at 05:07

## 2020-07-28 RX ADMIN — SENNOSIDES-DOCUSATE SODIUM TAB 8.6-50 MG 2 TABLET: 8.6-5 TAB at 05:07

## 2020-07-28 RX ADMIN — POLYETHYLENE GLYCOL 3350 1 PACKET: 17 POWDER, FOR SOLUTION ORAL at 23:11

## 2020-07-28 RX ADMIN — MORPHINE SULFATE 2 MG: 4 INJECTION INTRAVENOUS at 13:21

## 2020-07-28 RX ADMIN — MORPHINE SULFATE 2 MG: 4 INJECTION INTRAVENOUS at 17:47

## 2020-07-28 RX ADMIN — MORPHINE SULFATE 2 MG: 4 INJECTION INTRAVENOUS at 22:11

## 2020-07-28 RX ADMIN — AMPICILLIN SODIUM AND SULBACTAM SODIUM 3 G: 2; 1 INJECTION, POWDER, FOR SOLUTION INTRAMUSCULAR; INTRAVENOUS at 18:22

## 2020-07-28 RX ADMIN — ACETAMINOPHEN 1000 MG: 10 INJECTION, SOLUTION INTRAVENOUS at 17:50

## 2020-07-28 RX ADMIN — THERA TABS 1 TABLET: TAB at 11:59

## 2020-07-28 RX ADMIN — KETOROLAC TROMETHAMINE 15 MG: 30 INJECTION, SOLUTION INTRAMUSCULAR at 23:13

## 2020-07-28 RX ADMIN — ACETAMINOPHEN 650 MG: 325 TABLET, FILM COATED ORAL at 00:31

## 2020-07-28 RX ADMIN — MORPHINE SULFATE 2 MG: 4 INJECTION INTRAVENOUS at 11:09

## 2020-07-28 RX ADMIN — MORPHINE SULFATE 4 MG: 4 INJECTION INTRAVENOUS at 04:54

## 2020-07-28 ASSESSMENT — ENCOUNTER SYMPTOMS
PSYCHIATRIC NEGATIVE: 1
DIAPHORESIS: 0
EYES NEGATIVE: 1
FLANK PAIN: 0
GASTROINTESTINAL NEGATIVE: 1
CHILLS: 0
MUSCULOSKELETAL NEGATIVE: 1
CARDIOVASCULAR NEGATIVE: 1
RESPIRATORY NEGATIVE: 1
NEUROLOGICAL NEGATIVE: 1

## 2020-07-28 NOTE — PROGRESS NOTES
[id number] Call placed to hospitalist. Awaiting for reply.     0020 Dr. Stephens called back. MD updated regarding pt's elevated temp. MD ordered Tylenol (see MAR) MAR updated by MD.

## 2020-07-28 NOTE — WOUND TEAM
[facility]ound [facility] Ostomy [facility]  Inpatient Services  Initial Wound and Skin Care Evaluation    Admission Date: 7/25/2020     Last order of IP CONSULT TO WOUND CARE was found on 7/26/2020 from Hospital Encounter on 7/25/2020       HPI, PMH, SH: Reviewed    Unit where seen by Wound Team: 2215/01     WOUND CONSULT/FOLLOW UP RELATED TO: scrotal I&D site     Self Report / Pain Level: 10/10 despite IV morphine and dilaudid with packing removal     OBJECTIVE:  On pressure redistribution mattress, heels floated off pillows, dtr Aretha @ bs    WOUND TYPE, LOCATION, CHARACTERISTICS (Pressure Injuries: location, stage, POA or date identified)  Wound 07/25/20 Full Thickness Wound Scrotum Right penrose drain & sutures (Active)      07/27/20 1900   Site Assessment Painful;Red;Drainage    Periwound Assessment Induration;Red;Warm    Margins Defined edges    Closure Secondary intention;Sutures    Drainage Amount Moderate    Drainage Description Serosanguineous    Treatments Cleansed    Wound Cleansing Normal Saline Irrigation    Periwound Protectant Skin Protectant Wipes to Periwound;Drape    Dressing Cleansing/Solutions Not Applicable    Dressing Options Wound Vac    Dressing Changed Changed    Dressing Status Intact    Dressing Change/Treatment Frequency Monday, Wednesday, Friday, and As Needed    NEXT Dressing Change/Treatment Date 07/29/20    NEXT Weekly Photo (Inpatient Only) 08/03/20    Non-staged Wound Description Full thickness    Wound Length (cm) 11 cm 07/27/20 1900   Wound Width (cm) 1.5 cm 07/27/20 1900   Wound Depth (cm) 2.7 cm 07/27/20 1900   Wound Surface Area (cm^2) 16.5 cm^2 07/27/20 1900   Wound Volume (cm^3) 44.55 cm^3 07/27/20 1900   Undermining (cm) 12 cm 07/27/20 1900   Undermining of Wound, 1st Location From 9 o'clock;To 11 o'clock 07/27/20 1900   Undermining (cm) - 2nd location 3.4 cm 07/27/20 1900   Undermining of Wound, 2nd Location From 2 o'clock;To 3 o'clock 07/27/20 1900   Shape linear    Wound Odor None   "  Exposed Structures Sutures    Number of days: 2       Negative Pressure Wound Therapy 07/27/20 Surgical (Active)   NPWT Pump Mode / Pressure Setting 100 mmHg;Continuous    Dressing Type Small;Black Foam (Regular)    Number of Foam Pieces Used 2    Canister Changed Yes    NEXT Dressing Change/Treatment Date 07/29/20        Vascular:    JW:   No results found.      Lab Values:    Lab Results   Component Value Date/Time    WBC 11.0 (H) 07/26/2020 05:05 AM    RBC 4.34 (L) 07/26/2020 05:05 AM    HEMOGLOBIN 11.2 (L) 07/26/2020 05:05 AM    HEMATOCRIT 35.3 (L) 07/26/2020 05:05 AM    CREACTPROT 0.58 07/01/2020 07:03 AM    SEDRATEWES 15 07/01/2020 07:03 AM    HBA1C 8.6 (A) 01/07/2020 07:14 AM          Culture Results show:  No results found for this or any previous visit (from the past 720 hour(s)).      INTERVENTIONS BY WOUND TEAM: Soaked drsg. Removed packing, having to continue to soak with removal and pausing for pt to collect himself. Cleaned wound with NS, dried, slightly. Mepitel over sutures.  Loosely filled wound bed with one piece of black foam with button. Sealed and TRAC pad placed NPWT started  @ 125 mmHg continuous. Pt c/o pain \"It just won't stop.\" Decreased pressure to 100 mmHg and pt tolerated better.     Interdisciplinary consultation: Patient, Bedside RN    EVALUATION: Large full thickness wounds with 2 kinds of packing present and penrose drain. There is undermining present , most significant @ 10-11 o'clock. Sutures impeding application of VAC drsg completely into wound bed. Will call tomorrow for permission to remove sutures and gain access to wound bed.    Goals: Steady decrease in wound area and depth weekly.    NURSING PLAN OF CARE ORDERS (X):    Dressing changes: See Dressing Care orders: x  Skin care: See Skin Care orders:   Rectal tube care: See Rectal Tube Care orders:   Other orders:    RSKIN:   CURRENTLY IN PLACE (X), APPLIED THIS VISIT (A), ORDERED (O):Q shift Neal:  x  Q shift pressure " point assessments:    Pressure redistribution mattress   x         Low Airloss          Bariatric MASSIMO         Bariatric foam           Heel float boots     Heel Silicone dressing        Float Heels off Bed with Pillows               Barrier wipes         Barrier Cream         Barrier paste          Sacral silicone dressing         Silicone O2 tubing    intermittent     Anchorfast         Cannula fixation Device (Tender )          Gray Foam Ear protectors           Trach with Optifoam split foam                 Waffle cushion        Waffle Overlay         Rectal tube or BMS    Purwick/Condom Cath          Antifungal tx      Interdry          Reposition q 2 hours   x     Up to chair        Ambulate      PT/OT        Dietician        Diabetes Education      PO x    TF     TPN     NPO   # days   Other        WOUND TEAM PLAN OF CARE   Dressing changes by wound team:          Follow up 1-2 times weekly:               Follow up 3 times weekly:                NPWT change 3 times weekly:   x  Follow up as needed:       Other (explain):     Anticipated discharge plans:  LTACH:        SNF/Rehab:                  Home Care:           Outpatient Wound Center:            Self Care:            Other: will need VAC and drsg changes

## 2020-07-28 NOTE — CARE PLAN
Problem: Pain Management  Goal: Pain level will decrease to patient's comfort goal  Outcome: PROGRESSING AS EXPECTED   Pt's pain well-controlled at the moment. PRN pain med available. Encouraged to inform RN if pain is greater than comfort level.    Problem: Safety  Goal: Will remain free from injury  Outcome: PROGRESSING AS EXPECTED   Pt educated to call when in need. Call light and personal belongings w/n reach. Room free of clutters. Bed locked and in lowest position. Answer call light immediately.     Problem: Urinary Elimination:  Goal: Ability to reestablish a normal urinary elimination pattern will improve  Outcome: PROGRESSING AS EXPECTED   Pt on go cath with active order. Will continue to monitor the need.

## 2020-07-28 NOTE — DISCHARGE PLANNING
Anticipated Discharge Disposition: TBD     Action: LSW Geraldo Texted Dr. Hood to staff pt and see if any d/c needs anticipated at this time.     Barriers to Discharge: None    Plan: LSW to await update from Dr. Hood, PATRICA to assist with d/c needs, LSW to assist as needed       Addendum 1553  LSW informed that pt will be needing a wound vac, will be getting PICC Line, will need home IVABX and HH.     LSW faxed [employer] facesheet, H&P, most recent wound team note.

## 2020-07-28 NOTE — PROGRESS NOTES
Pt calm and resting. No c/o of pain or discomfort. Due meds given. POC discussed with pt including pain control, IV abx, diet, oral care, safety. Pt verbalized understanding. Pt's dressing to scrotum c/d/i, with wound vac. Foely bag emptied. Safety and comfort measures in place.  No additional needs at this time.

## 2020-07-28 NOTE — PROGRESS NOTES
Pt's FsBG this morning is 56mg/dl; Pt offered glucose tab, but declines and requested for crackers and juice instead. Will continue to monitor.

## 2020-07-28 NOTE — PROGRESS NOTES
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 male who presented 7/25/2020 with hx of SLE dx > 2019 on imuran & prednisone Followed by Dr. 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  Has had a rash on his inner thigh that he is following up with Dermatology    Went to the OR last night (Dr Power) for I and D of right hemiscrotum.  Off insulin drip  Did not need pressors      Interval Problem Update  Reviewed last 24 hour events:  As above  Pt feeling better  Received 2 Units FFP preop as pt was on eloquis  POD 1  Somnolent and falling asleep during interview, reporting pain 10/10    Review of Systems  Review of Systems   Constitutional: Positive for malaise/fatigue.   HENT: Negative.    Eyes: Negative.    Respiratory: Negative.    Cardiovascular: Negative.    Gastrointestinal: Negative.    Genitourinary:        Scrotal pain   Musculoskeletal: Negative.    Skin: Negative.    Neurological: Negative.    Endo/Heme/Allergies: Negative.    Psychiatric/Behavioral: Negative.         Vital Signs for last 24 hours   Temp:  [36.7 °C (98 °F)-36.8 °C (98.2 °F)] 36.8 °C (98.2 °F)  Pulse:  [81-82] 82  Resp:  [18] 18  BP: (119-131)/(64-65) 131/65  SpO2:  [98 %] 98 %       Physical Exam   Physical Exam  Vitals signs and nursing note reviewed.   Constitutional:       Appearance: He is ill-appearing.      Comments: Somnolent and appears fatigued   HENT:      Head: Normocephalic and atraumatic.      Nose: Nose normal.      Mouth/Throat:      Mouth: Mucous membranes are moist.   Eyes:      Extraocular Movements: Extraocular movements intact.      Pupils: Pupils are equal,  round, and reactive to light.   Neck:      Musculoskeletal: Normal range of motion.   Cardiovascular:      Rate and Rhythm: Normal rate.   Pulmonary:      Effort: Pulmonary effort is normal.   Abdominal:      General: Abdomen is flat. Bowel sounds are normal.      Palpations: Abdomen is soft.   Genitourinary:     Comments: Marked decrease in scrotal swelling  Incision open and packed tender still in the groin area  Musculoskeletal: Normal range of motion.   Skin:     General: Skin is warm.   Neurological:      General: No focal deficit present.      Mental Status: He is alert and oriented to person, place, and time. Mental status is at baseline.   Psychiatric:         Mood and Affect: Mood normal.         Behavior: Behavior normal.         Thought Content: Thought content normal.         Judgment: Judgment normal.       Medications  Current Facility-Administered Medications   Medication Dose Route Frequency Provider Last Rate Last Dose   • clindamycin (CLEOCIN) capsule 600 mg  600 mg Oral Q8HRS Julian Hood M.D.   600 mg at 07/27/20 2222   • insulin regular (HumuLIN R,NovoLIN R) injection  2-10 Units Subcutaneous 4X/DAY ACHS Lanie Knight M.D.   Stopped at 07/27/20 2100   • omeprazole (PRILOSEC) capsule 20 mg  20 mg Oral DAILY Dre Rosenbaum M.D.   20 mg at 07/27/20 0519   • insulin glargine (Lantus) injection  18 Units Subcutaneous Q EVENING rDe Rosenbaum M.D.   18 Units at 07/27/20 1847   • enoxaparin (LOVENOX) inj 40 mg  40 mg Subcutaneous DAILY Dre Rosenbaum M.D.   40 mg at 07/27/20 0519   • HYDROmorphone pf (DILAUDID) injection 1 mg  1 mg Intravenous Q3HRS PRN Lanie Knight M.D.   1 mg at 07/27/20 1729   • lactated ringers infusion (BOLUS): BMI greater than 30  30 mL/kg (Ideal) Intravenous Once PRN Dre Rosenbaum M.D.       • senna-docusate (PERICOLACE or SENOKOT S) 8.6-50 MG per tablet 2 Tab  2 Tab Oral BID Dre Rosenbaum M.D.   2 Tab at 07/27/20  0519    And   • polyethylene glycol/lytes (MIRALAX) PACKET 1 Packet  1 Packet Oral QDAY PRSENDY Rosenbaum M.D.        And   • magnesium hydroxide (MILK OF MAGNESIA) suspension 30 mL  30 mL Oral QDAY PRSENDY Rosenbaum M.D.        And   • bisacodyl (DULCOLAX) suppository 10 mg  10 mg Rectal QDAY PRSENDY Rosenbaum M.D.       • ondansetron (ZOFRAN) syringe/vial injection 4 mg  4 mg Intravenous Q4HRS PRSENDY Rosenbaum M.D.       • ondansetron (ZOFRAN ODT) dispertab 4 mg  4 mg Oral Q4HRS PRSENDY Rosenbaum M.D.       • glucose 4 g chewable tablet 16 g  16 g Oral Q15 MIN CLAYTON Rosenbaum M.D.        And   • dextrose 50% (D50W) injection 50 mL  50 mL Intravenous Q15 MIN PRSENDY Rosenbaum M.D.       • piperacillin-tazobactam (ZOSYN) 4.5 g in  mL IVPB  4.5 g Intravenous Q8HRS Dre Rosenbaum M.D. 25 mL/hr at 07/27/20 2112 4.5 g at 07/27/20 2112   • morphine (pf) 4 MG/ML injection 2-4 mg  2-4 mg Intravenous Q2HRS PRSENDY Rosenbaum M.D.   4 mg at 07/27/20 1845       Fluids    Intake/Output Summary (Last 24 hours) at 7/27/2020 2240  Last data filed at 7/27/2020 2112  Gross per 24 hour   Intake 950 ml   Output 1700 ml   Net -750 ml       Laboratory          Recent Labs     07/25/20  1400 07/26/20  0058 07/26/20  0505   SODIUM 132*  --  138   POTASSIUM 3.7 3.9 3.7   CHLORIDE 96  --  106   CO2 23  --  24   BUN 28*  --  17   CREATININE 0.89  --  0.53   CALCIUM 8.5  --  7.9*     Recent Labs     07/25/20  1400 07/26/20  0505   ALTSGPT 7 7   ASTSGOT 10* 11*   ALKPHOSPHAT 138* 73   TBILIRUBIN 0.8 0.6   GLUCOSE 479* 109*     Recent Labs     07/25/20  1400 07/26/20  0505   WBC 14.4* 11.0*   NEUTSPOLYS 93.60*  --    LYMPHOCYTES 1.50*  --    MONOCYTES 2.70  --    EOSINOPHILS 0.20  --    BASOPHILS 0.40  --    ASTSGOT 10* 11*   ALTSGPT 7 7   ALKPHOSPHAT 138* 73   TBILIRUBIN 0.8 0.6     Recent Labs     07/25/20  1400 07/26/20  0505   RBC 5.23 4.34*   HEMOGLOBIN 13.3*  11.2*   HEMATOCRIT 43.2 35.3*   PLATELETCT 255 247   PROTHROMBTM 19.3*  --    INR 1.59*  --        Imaging  No new imaging    Assessment/Plan  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 Dr Whitmore discussed with urology and they are aware  Pt and daughter aware too        Thrombocytopenia (HCC)- (present on admission)  Assessment & Plan  On hydroxyurea as outpt but anemic here so on hold    CAD (coronary artery disease)- (present on admission)  Assessment & Plan  ASA being held in case needs to return to OR    Type 2 diabetes mellitus, with long-term current use of insulin (HCC)- (present on admission)  Assessment & Plan  Tresiba 18 units q pm  Diabetic diet  Glucose within goal range    Paroxysmal A-fib (HCC)- (present on admission)  Assessment & Plan  Sinus here  eloquis on hold post op  On lovenox subq for DVT  No oozing from packing  Platelets normal    Pancreatic cyst  Assessment & Plan  Tail of pancreas  Will need follow up outpt    SLE (systemic lupus erythematosus related syndrome) (HCC)  Assessment & Plan  High risk of flare  His complements are always low so wont send  Hold imuran and steroids (see above on steroids)     VTE:  Lovenox  Ulcer: PPI  Lines: None    I have performed a physical exam and reviewed and updated ROS and Plan today (7/27/2020). In review of yesterday's note (7/26/2020), there are no changes except as documented above.     Discussed with patient and RN    This note was generated using voice recognition software which has a chance of producing errors of grammar and content.  I  have made every reasonable attempt to find and correct any errors, but it should be expected that some may not be found prior to finalization of this note.  __________  Julian Hood MD  Pulmonary and Critical Care Medicine  [facility]

## 2020-07-28 NOTE — DIETARY
"Nutrition Consult: Day 3 of admit.  Francesco Schulte is a 69 y.o. male with admitting DX of Landon's gangrene of scrotum.  Consult received for hypoalbuminemia.    Assessment:  Height: 188 cm (6' 2.02\")  Weight: 90.8 kg (200 lb 2.8 oz)  Body mass index is 25.69 kg/m²., BMI classification: Overweight  Diet/Intake: Diabetic, 1500 kcal    Evaluation:   1. Pt S/P I&D of right hemiscrotum. Per wound care note, pt with large, full thickness wounds with wound vac.  2. Labs include Alb 1.7 (7/28). No recent CRP or Pre-Alb available.   3. Good PO intake recorded with pt usually eating %. Pt states appetite good. Discussed food preferences. Pt aware of the need for good protein intake to help promote wound healing and would like more protein at meals.   4. Added HS snack of turkey sandwich per pt preference.  5. Discussed calorie restricted diet with MD. Diet liberalized to Diabetic, high protein to help encourage adequate protein intake to support wound healing.  6. MD added MVI daily.    Malnutrition Risk: Criteria not met.    Recommendations/Plan:  1. Diabetic, high protein diet.   2. Encourage continued good intake of meals and snacks.  3. Document intake of all meals and snacks as % taken in ADL's to provide interdisciplinary communication across all shifts.   4. Monitor weight.  5. Nutrition rep will continue to see patient for ongoing meal and snack preferences.           "

## 2020-07-28 NOTE — PROGRESS NOTES
Patient would like pain meds changed to Morphine 2 mg every 2 hours not current dose and also he would like some oral pain meds ordered.

## 2020-07-28 NOTE — PROGRESS NOTES
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  Off insulin drip  Did not need pressors      Interval Problem Update  Reviewed last 24 hour events:  POD#2  Temp 101.5 overnight, defervesced with Tylenol  Otherwise vital signs stable  WBC stable at 11.1 with PMN predominance; stable normocytic anemia  Wound care following  Remains on zosyn 4.5/clindamycin 600; 1 dose of vancomycin on 7/25  Urine cultures with > 100,000 CFU group B strep, mixed skin jeanine  Strep viridans from wound culture, Gram stain many GPC, GPR  FSBG 56 this AM, asymptomatic    Started on ketorolac, Ofirmev for pain  Subjectively pain better controlled    Review of Systems  Review of Systems   Constitutional: Positive for malaise/fatigue.   HENT: Negative.    Eyes: Negative.    Respiratory: Negative.    Cardiovascular: Negative.    Gastrointestinal: Negative.    Genitourinary:        Scrotal pain   Musculoskeletal: Negative.    Skin: Negative.    Neurological: Negative.    Endo/Heme/Allergies: Negative.    Psychiatric/Behavioral: Negative.         Vital Signs for last 24 hours   Temp:  [36.7 °C (98.1 °F)-38.6 °C (101.5 °F)] 38.1 °C (100.6 °F)  Pulse:  [67-87] 78  Resp:  [18] 18  BP: (113-137)/(58-72) 137/63  SpO2:  [93 %-98 %] 93 %       Physical Exam   Physical Exam  Vitals signs and nursing note reviewed.   Constitutional:       Appearance: He is not ill-appearing.      Comments: Somnolent and still  fatigued, less slurred speech   HENT:      Head: Normocephalic and atraumatic.      Nose: Nose normal.      Mouth/Throat:      Mouth: Mucous membranes are moist.   Eyes:      Extraocular Movements: Extraocular movements intact.      Pupils: Pupils are equal, round, and reactive to light.   Neck:      Musculoskeletal: Normal range of motion.   Cardiovascular:      Rate and Rhythm: Normal rate.   Pulmonary:      Effort: Pulmonary effort is normal.   Abdominal:      General: Abdomen is flat. Bowel sounds are normal.      Palpations: Abdomen is soft.   Genitourinary:     Comments: Marked decrease in scrotal swelling  Incision open and packed tender still in the groin area  Wound vac in place  Musculoskeletal: Normal range of motion.   Skin:     General: Skin is warm.   Neurological:      General: No focal deficit present.      Mental Status: He is alert and oriented to person, place, and time. Mental status is at baseline.   Psychiatric:         Mood and Affect: Mood normal.         Behavior: Behavior normal.         Thought Content: Thought content normal.         Judgment: Judgment normal.       Medications  Current Facility-Administered Medications   Medication Dose Route Frequency Provider Last Rate Last Dose   • acetaminophen (OFIRMEV) injection 1,000 mg  1,000 mg Intravenous Q8HRS Julian Hood M.D.   1,000 mg at 07/28/20 1750   • insulin glargine (Lantus) injection  15 Units Subcutaneous Q EVENING Julian Hood M.D.   15 Units at 07/28/20 1748   • morphine (pf) 4 MG/ML injection 2 mg  2 mg Intravenous Q2HRS PRN Julian Hood M.D.   2 mg at 07/28/20 1954   • oxyCODONE immediate release (ROXICODONE) tablet 10 mg  10 mg Oral Q6HRS PRN Julian Hood M.D.   10 mg at 07/28/20 1712   • ampicillin/sulbactam (UNASYN) 3 g in  mL IVPB  3 g Intravenous Q6HRS Julian Hood M.D.   Stopped at 07/28/20 1852   • multivitamin (THERAGRAN) tablet 1 Tab  1 Tab Oral DAILY Julian Hood M.D.   1 Tab at  07/28/20 1159   • hyoscyamine-maalox plus-lidocaine viscous (GI COCKTAIL) oral susp 15 mL  15 mL Oral Q4HRS PRN Julian Hood M.D.       • ketorolac (TORADOL) injection 15 mg  15 mg Intravenous Q6HRS PRN Julian Hood M.D.   15 mg at 07/28/20 1122   • insulin regular (HumuLIN R,NovoLIN R) injection  2-10 Units Subcutaneous 4X/DAY MYKE Knight M.D.   4 Units at 07/28/20 2051   • omeprazole (PRILOSEC) capsule 20 mg  20 mg Oral DAILY Dre Rosenbaum M.D.   20 mg at 07/28/20 0507   • enoxaparin (LOVENOX) inj 40 mg  40 mg Subcutaneous DAILY Dre Rosenbaum M.D.   40 mg at 07/28/20 0508   • lactated ringers infusion (BOLUS): BMI greater than 30  30 mL/kg (Ideal) Intravenous Once PRN Dre Rosenbaum M.D.       • senna-docusate (PERICOLACE or SENOKOT S) 8.6-50 MG per tablet 2 Tab  2 Tab Oral BID Dre Rosenbaum M.D.   2 Tab at 07/28/20 1712    And   • polyethylene glycol/lytes (MIRALAX) PACKET 1 Packet  1 Packet Oral QDAY PRN Dre Rosenbaum M.D.        And   • magnesium hydroxide (MILK OF MAGNESIA) suspension 30 mL  30 mL Oral QDAY PRN Dre Rosenbaum M.D.        And   • bisacodyl (DULCOLAX) suppository 10 mg  10 mg Rectal QDAY PRN Dre Rosenbaum M.D.       • ondansetron (ZOFRAN) syringe/vial injection 4 mg  4 mg Intravenous Q4HRS PRSENDY Rosenbaum M.D.   4 mg at 07/27/20 2332   • ondansetron (ZOFRAN ODT) dispertab 4 mg  4 mg Oral Q4HRS PRSENDY Rosenbaum M.D.       • glucose 4 g chewable tablet 16 g  16 g Oral Q15 MIN PRN Dre Rosenbaum M.D.        And   • dextrose 50% (D50W) injection 50 mL  50 mL Intravenous Q15 MIN PRN Dre Rosenbaum M.D.           Fluids    Intake/Output Summary (Last 24 hours) at 7/28/2020 2111  Last data filed at 7/28/2020 1800  Gross per 24 hour   Intake 1340 ml   Output 910 ml   Net 430 ml       Laboratory          Recent Labs     07/26/20  0058 07/26/20  0505 07/28/20  0320   SODIUM  --  138 135    POTASSIUM 3.9 3.7 3.5*   CHLORIDE  --  106 102   CO2  --  24 24   BUN  --  17 10   CREATININE  --  0.53 0.71   CALCIUM  --  7.9* 7.8*     Recent Labs     07/26/20  0505 07/28/20  0320 07/28/20  1013   ALTSGPT 7 7  --    ASTSGOT 11* 18  --    ALKPHOSPHAT 73 128*  --    TBILIRUBIN 0.6 0.8  --    PREALBUMIN  --   --  4.5*   GLUCOSE 109* 56*  --      Recent Labs     07/26/20  0505 07/28/20  0320   WBC 11.0* 11.1*   NEUTSPOLYS  --  86.80*   LYMPHOCYTES  --  5.40*   MONOCYTES  --  4.90   EOSINOPHILS  --  1.40   BASOPHILS  --  0.50   ASTSGOT 11* 18   ALTSGPT 7 7   ALKPHOSPHAT 73 128*   TBILIRUBIN 0.6 0.8     Recent Labs     07/26/20  0505 07/28/20  0320   RBC 4.34* 4.42*   HEMOGLOBIN 11.2* 11.5*   HEMATOCRIT 35.3* 37.0*   PLATELETCT 247 370       Imaging  No new imaging    Assessment/Plan  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)  Assessment & Plan  7/25 I &D of right hemiscrotum; currently incision still open as packed  Vanc/zosyn and clindamycin -> strep viridans from wound, GBS from the urine -> narrow abx to unasyn 7/28  Adjust pain meds: dc dilaudid, start oxy IR 10, ofirmev ATC, cont morphine and ketorolac  Wound care following, wound vac in place  Discussed fevers with urology- feel confident source controlled, repeat blood and urine cultures  Main concern is that he is chronically on steroids and he may develop adrenal insufficiency but on the flip side steroids worsen his risk of healing especially with how necrotic it was.    Thrombocytopenia (HCC)- (present on admission)  Assessment & Plan  On hydroxyurea as outpt but anemic here so on hold    CAD (coronary artery disease)- (present on admission)  Assessment & Plan  ASA being held in case needs to return to OR    Type 2 diabetes mellitus, with long-term current use of insulin (HCC)- (present on admission)  Assessment & Plan  Reduce lantus 15u QHS due to hypoglycemia  Diabetic diet, high protein, add MVI for wound healing  Glucose within goal  range  GI cocktail    Paroxysmal A-fib (HCC)- (present on admission)  Assessment & Plan  Sinus here  Cont to hold eliquis post op  Cont lovenox subq for DVT  No oozing from packing  Platelets normal    Pancreatic cyst  Assessment & Plan  Tail of pancreas  Will need follow up outpt    SLE (systemic lupus erythematosus related syndrome) (HCC)  Assessment & Plan  High risk of flare  His complements are always low so wont send  Hold imuran and steroids (see above on steroids)     VTE:  Lovenox  Ulcer: PPI  Lines: None    I have performed a physical exam and reviewed and updated ROS and Plan today (7/28/2020). In review of yesterday's note (7/27/2020), there are no changes except as documented above.     Discussed with patient, daughter, RN, pharmacy and urology.    This note was generated using voice recognition software which has a chance of producing errors of grammar and content.  I have made every reasonable attempt to find and correct any errors, but it should be expected that some may not be found prior to finalization of this note.  __________  Julian Hood MD  Pulmonary and Critical Care Medicine  [facility]

## 2020-07-28 NOTE — PROGRESS NOTES
Note to reader: this note follows the APSO format rather than the historical SOAP format. Assessment and plan located at the top of the note for ease of use.    Chief Complaint  69 y.o. 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     Assessment/Plan  Interval History   Fourniers gangrene of the scrotum s/p debridement 7/25/20    PLAN:   Wound vac placed.   Andres draining clear yellow urine- patient request to keep it in place.   Antibiotics per Hospital team.  Will continue to follow to assess need for further debridement.          Patient seen and examined    7/28. POD #3. Was feeling loopy yesterday upon my evaluation. Wound vac placed evening 7/27. Surrounding tissue sensitive/tender but looking healthy without areas of necrosis. Will continue to monitor with daily exams. Continues to be quite loopy during each examination, discussion- unclear if this is his baseline.     7/26. POD #1. Feeling better than yesterday. Pain present in lower abdomen and scrotum but improved. Cultures pending. On Zosyn and Clindamycin. No fevers. WBC improved. To transfer to floor today.            Review of Systems  Physical Exam   Review of Systems   Constitutional: Negative for chills and diaphoresis.   Cardiovascular: Negative for chest pain.   Genitourinary: Negative for dysuria, flank pain and hematuria.        + scrotal pain/tenderness/swelling     Vitals:    07/27/20 1048 07/27/20 2309 07/28/20 0146 07/28/20 0500   BP: 131/65 137/72  113/58   Pulse: 82 87 67 67   Resp: 18 18  18   Temp: 36.8 °C (98.2 °F) (!) 38.6 °C (101.5 °F) 37.1 °C (98.7 °F) 36.8 °C (98.2 °F)   TempSrc: Oral Oral Oral Oral   SpO2: 98% 95% 97% 98%   Weight:       Height:         Physical Exam   Constitutional: He is oriented to person, place, and time and well-developed, well-nourished, and in no distress. No distress.   Pulmonary/Chest: Effort normal.   Abdominal: Soft. He exhibits no distension.   Genitourinary:    Genitourinary Comments:  Post surgical incision with wound vac in place. Scrotum is diffusely erythematous, swollen and extremely sensitive, induration continues to  extend high into the right inguinal canal. No areas of necrosis.     Andres draining clear.     Neurological: He is alert and oriented to person, place, and time.   Skin: Skin is warm and dry.   Psychiatric: Affect and judgment normal.   Nursing note and vitals reviewed.       Hematology Chemistry   Lab Results   Component Value Date/Time    WBC 11.1 (H) 07/28/2020 03:20 AM    HEMOGLOBIN 11.5 (L) 07/28/2020 03:20 AM    HEMATOCRIT 37.0 (L) 07/28/2020 03:20 AM    PLATELETCT 370 07/28/2020 03:20 AM     Lab Results   Component Value Date/Time    SODIUM 135 07/28/2020 03:20 AM    POTASSIUM 3.5 (L) 07/28/2020 03:20 AM    CHLORIDE 102 07/28/2020 03:20 AM    CO2 24 07/28/2020 03:20 AM    GLUCOSE 56 (L) 07/28/2020 03:20 AM    BUN 10 07/28/2020 03:20 AM    CREATININE 0.71 07/28/2020 03:20 AM         Labs not explicitly included in this progress note were reviewed by the author.   Radiology/imaging not explicitly included in this progress note was reviewed by the author.     Core Measures

## 2020-07-29 ENCOUNTER — APPOINTMENT (OUTPATIENT)
Dept: RADIOLOGY | Facility: MEDICAL CENTER | Age: 70
DRG: 854 | End: 2020-07-29
Attending: INTERNAL MEDICINE
Payer: MEDICARE

## 2020-07-29 ENCOUNTER — ANESTHESIA (OUTPATIENT)
Dept: SURGERY | Facility: MEDICAL CENTER | Age: 70
DRG: 854 | End: 2020-07-29
Payer: MEDICARE

## 2020-07-29 ENCOUNTER — ANESTHESIA EVENT (OUTPATIENT)
Dept: SURGERY | Facility: MEDICAL CENTER | Age: 70
DRG: 854 | End: 2020-07-29
Payer: MEDICARE

## 2020-07-29 LAB
ALBUMIN SERPL BCP-MCNC: 1.7 G/DL (ref 3.2–4.9)
ALBUMIN/GLOB SERPL: 0.5 G/DL
ALP SERPL-CCNC: 142 U/L (ref 30–99)
ALT SERPL-CCNC: 8 U/L (ref 2–50)
ANION GAP SERPL CALC-SCNC: 10 MMOL/L (ref 7–16)
AST SERPL-CCNC: 13 U/L (ref 12–45)
BASOPHILS # BLD AUTO: 0.3 % (ref 0–1.8)
BASOPHILS # BLD: 0.03 K/UL (ref 0–0.12)
BILIRUB SERPL-MCNC: 0.6 MG/DL (ref 0.1–1.5)
BUN SERPL-MCNC: 11 MG/DL (ref 8–22)
CALCIUM SERPL-MCNC: 7.9 MG/DL (ref 8.4–10.2)
CHLORIDE SERPL-SCNC: 103 MMOL/L (ref 96–112)
CO2 SERPL-SCNC: 23 MMOL/L (ref 20–33)
CREAT SERPL-MCNC: 0.68 MG/DL (ref 0.5–1.4)
EOSINOPHIL # BLD AUTO: 0.13 K/UL (ref 0–0.51)
EOSINOPHIL NFR BLD: 1.1 % (ref 0–6.9)
ERYTHROCYTE [DISTWIDTH] IN BLOOD BY AUTOMATED COUNT: 56.6 FL (ref 35.9–50)
GLOBULIN SER CALC-MCNC: 3.5 G/DL (ref 1.9–3.5)
GLUCOSE BLD-MCNC: 160 MG/DL (ref 65–99)
GLUCOSE BLD-MCNC: 164 MG/DL (ref 65–99)
GLUCOSE BLD-MCNC: 186 MG/DL (ref 65–99)
GLUCOSE SERPL-MCNC: 254 MG/DL (ref 65–99)
HCT VFR BLD AUTO: 37.5 % (ref 42–52)
HGB BLD-MCNC: 11.3 G/DL (ref 14–18)
IMM GRANULOCYTES # BLD AUTO: 0.18 K/UL (ref 0–0.11)
IMM GRANULOCYTES NFR BLD AUTO: 1.5 % (ref 0–0.9)
LYMPHOCYTES # BLD AUTO: 0.45 K/UL (ref 1–4.8)
LYMPHOCYTES NFR BLD: 3.8 % (ref 22–41)
MCH RBC QN AUTO: 25.3 PG (ref 27–33)
MCHC RBC AUTO-ENTMCNC: 30.1 G/DL (ref 33.7–35.3)
MCV RBC AUTO: 83.9 FL (ref 81.4–97.8)
MONOCYTES # BLD AUTO: 0.47 K/UL (ref 0–0.85)
MONOCYTES NFR BLD AUTO: 3.9 % (ref 0–13.4)
NEUTROPHILS # BLD AUTO: 10.72 K/UL (ref 1.82–7.42)
NEUTROPHILS NFR BLD: 89.4 % (ref 44–72)
NRBC # BLD AUTO: 0 K/UL
NRBC BLD-RTO: 0 /100 WBC
PLATELET # BLD AUTO: 394 K/UL (ref 164–446)
PMV BLD AUTO: 10.8 FL (ref 9–12.9)
POTASSIUM SERPL-SCNC: 3.5 MMOL/L (ref 3.6–5.5)
PROT SERPL-MCNC: 5.2 G/DL (ref 6–8.2)
RBC # BLD AUTO: 4.47 M/UL (ref 4.7–6.1)
SODIUM SERPL-SCNC: 136 MMOL/L (ref 135–145)
WBC # BLD AUTO: 12 K/UL (ref 4.8–10.8)

## 2020-07-29 PROCEDURE — A6403 STERILE GAUZE>16 <= 48 SQ IN: HCPCS | Performed by: UROLOGY

## 2020-07-29 PROCEDURE — 700111 HCHG RX REV CODE 636 W/ 250 OVERRIDE (IP): Performed by: INTERNAL MEDICINE

## 2020-07-29 PROCEDURE — 99233 SBSQ HOSP IP/OBS HIGH 50: CPT | Performed by: INTERNAL MEDICINE

## 2020-07-29 PROCEDURE — 700105 HCHG RX REV CODE 258: Performed by: INTERNAL MEDICINE

## 2020-07-29 PROCEDURE — 0JBB0ZZ EXCISION OF PERINEUM SUBCUTANEOUS TISSUE AND FASCIA, OPEN APPROACH: ICD-10-PCS | Performed by: UROLOGY

## 2020-07-29 PROCEDURE — 80053 COMPREHEN METABOLIC PANEL: CPT

## 2020-07-29 PROCEDURE — 160028 HCHG SURGERY MINUTES - 1ST 30 MINS LEVEL 3: Performed by: UROLOGY

## 2020-07-29 PROCEDURE — A9270 NON-COVERED ITEM OR SERVICE: HCPCS

## 2020-07-29 PROCEDURE — 700111 HCHG RX REV CODE 636 W/ 250 OVERRIDE (IP): Performed by: ANESTHESIOLOGY

## 2020-07-29 PROCEDURE — 700105 HCHG RX REV CODE 258: Performed by: UROLOGY

## 2020-07-29 PROCEDURE — 500380 HCHG DRAIN, PENROSE 1/4X12: Performed by: UROLOGY

## 2020-07-29 PROCEDURE — 160035 HCHG PACU - 1ST 60 MINS PHASE I: Performed by: UROLOGY

## 2020-07-29 PROCEDURE — 700102 HCHG RX REV CODE 250 W/ 637 OVERRIDE(OP): Performed by: INTERNAL MEDICINE

## 2020-07-29 PROCEDURE — 0VB50ZZ EXCISION OF SCROTUM, OPEN APPROACH: ICD-10-PCS | Performed by: UROLOGY

## 2020-07-29 PROCEDURE — 85025 COMPLETE CBC W/AUTO DIFF WBC: CPT

## 2020-07-29 PROCEDURE — 700101 HCHG RX REV CODE 250: Performed by: ANESTHESIOLOGY

## 2020-07-29 PROCEDURE — 160002 HCHG RECOVERY MINUTES (STAT): Performed by: UROLOGY

## 2020-07-29 PROCEDURE — 501838 HCHG SUTURE GENERAL: Performed by: UROLOGY

## 2020-07-29 PROCEDURE — 71045 X-RAY EXAM CHEST 1 VIEW: CPT

## 2020-07-29 PROCEDURE — A9270 NON-COVERED ITEM OR SERVICE: HCPCS | Performed by: INTERNAL MEDICINE

## 2020-07-29 PROCEDURE — 501445 HCHG STAPLER, SKIN DISP: Performed by: UROLOGY

## 2020-07-29 PROCEDURE — 770006 HCHG ROOM/CARE - MED/SURG/GYN SEMI*

## 2020-07-29 PROCEDURE — 36415 COLL VENOUS BLD VENIPUNCTURE: CPT

## 2020-07-29 PROCEDURE — 160036 HCHG PACU - EA ADDL 30 MINS PHASE I: Performed by: UROLOGY

## 2020-07-29 PROCEDURE — 700102 HCHG RX REV CODE 250 W/ 637 OVERRIDE(OP)

## 2020-07-29 PROCEDURE — 160048 HCHG OR STATISTICAL LEVEL 1-5: Performed by: UROLOGY

## 2020-07-29 PROCEDURE — 160009 HCHG ANES TIME/MIN: Performed by: UROLOGY

## 2020-07-29 PROCEDURE — A4314 CATH W/DRAINAGE 2-WAY LATEX: HCPCS | Performed by: UROLOGY

## 2020-07-29 PROCEDURE — 160039 HCHG SURGERY MINUTES - EA ADDL 1 MIN LEVEL 3: Performed by: UROLOGY

## 2020-07-29 PROCEDURE — 82962 GLUCOSE BLOOD TEST: CPT | Mod: 91

## 2020-07-29 PROCEDURE — A9270 NON-COVERED ITEM OR SERVICE: HCPCS | Performed by: UROLOGY

## 2020-07-29 PROCEDURE — 700102 HCHG RX REV CODE 250 W/ 637 OVERRIDE(OP): Performed by: UROLOGY

## 2020-07-29 RX ORDER — MEPERIDINE HYDROCHLORIDE 25 MG/ML
12.5 INJECTION INTRAMUSCULAR; INTRAVENOUS; SUBCUTANEOUS
Status: DISCONTINUED | OUTPATIENT
Start: 2020-07-29 | End: 2020-07-29 | Stop reason: HOSPADM

## 2020-07-29 RX ORDER — ACETAMINOPHEN 10 MG/ML
1000 INJECTION, SOLUTION INTRAVENOUS EVERY 8 HOURS
Status: COMPLETED | OUTPATIENT
Start: 2020-07-29 | End: 2020-07-29

## 2020-07-29 RX ORDER — HYDROMORPHONE HYDROCHLORIDE 1 MG/ML
0.4 INJECTION, SOLUTION INTRAMUSCULAR; INTRAVENOUS; SUBCUTANEOUS
Status: DISCONTINUED | OUTPATIENT
Start: 2020-07-29 | End: 2020-07-29 | Stop reason: HOSPADM

## 2020-07-29 RX ORDER — METOPROLOL TARTRATE 1 MG/ML
1 INJECTION, SOLUTION INTRAVENOUS
Status: DISCONTINUED | OUTPATIENT
Start: 2020-07-29 | End: 2020-07-29 | Stop reason: HOSPADM

## 2020-07-29 RX ORDER — HALOPERIDOL 5 MG/ML
1 INJECTION INTRAMUSCULAR
Status: DISCONTINUED | OUTPATIENT
Start: 2020-07-29 | End: 2020-07-29 | Stop reason: HOSPADM

## 2020-07-29 RX ORDER — SODIUM CHLORIDE, SODIUM LACTATE, POTASSIUM CHLORIDE, CALCIUM CHLORIDE 600; 310; 30; 20 MG/100ML; MG/100ML; MG/100ML; MG/100ML
INJECTION, SOLUTION INTRAVENOUS CONTINUOUS
Status: DISCONTINUED | OUTPATIENT
Start: 2020-07-29 | End: 2020-07-29 | Stop reason: HOSPADM

## 2020-07-29 RX ORDER — LABETALOL HYDROCHLORIDE 5 MG/ML
5 INJECTION, SOLUTION INTRAVENOUS
Status: DISCONTINUED | OUTPATIENT
Start: 2020-07-29 | End: 2020-07-29 | Stop reason: HOSPADM

## 2020-07-29 RX ORDER — SODIUM CHLORIDE, SODIUM LACTATE, POTASSIUM CHLORIDE, CALCIUM CHLORIDE 600; 310; 30; 20 MG/100ML; MG/100ML; MG/100ML; MG/100ML
INJECTION, SOLUTION INTRAVENOUS CONTINUOUS
Status: ACTIVE | OUTPATIENT
Start: 2020-07-29 | End: 2020-07-30

## 2020-07-29 RX ORDER — OXYCODONE HCL 5 MG/5 ML
5 SOLUTION, ORAL ORAL
Status: DISCONTINUED | OUTPATIENT
Start: 2020-07-29 | End: 2020-07-29 | Stop reason: HOSPADM

## 2020-07-29 RX ORDER — OXYCODONE HCL 5 MG/5 ML
10 SOLUTION, ORAL ORAL
Status: DISCONTINUED | OUTPATIENT
Start: 2020-07-29 | End: 2020-07-29 | Stop reason: HOSPADM

## 2020-07-29 RX ORDER — MICONAZOLE NITRATE 20 MG/G
CREAM TOPICAL 2 TIMES DAILY
Status: DISPENSED | OUTPATIENT
Start: 2020-07-29 | End: 2020-08-05

## 2020-07-29 RX ORDER — HYDRALAZINE HYDROCHLORIDE 20 MG/ML
5 INJECTION INTRAMUSCULAR; INTRAVENOUS
Status: DISCONTINUED | OUTPATIENT
Start: 2020-07-29 | End: 2020-07-29 | Stop reason: HOSPADM

## 2020-07-29 RX ORDER — DEXTROSE MONOHYDRATE 25 G/50ML
50 INJECTION, SOLUTION INTRAVENOUS
Status: DISCONTINUED | OUTPATIENT
Start: 2020-07-29 | End: 2020-07-30

## 2020-07-29 RX ORDER — DEXTROSE MONOHYDRATE 25 G/50ML
50 INJECTION, SOLUTION INTRAVENOUS
Status: DISCONTINUED | OUTPATIENT
Start: 2020-07-29 | End: 2020-07-29

## 2020-07-29 RX ORDER — OXYCODONE HCL 5 MG/5 ML
SOLUTION, ORAL ORAL
Status: COMPLETED
Start: 2020-07-29 | End: 2020-07-29

## 2020-07-29 RX ORDER — LIDOCAINE HYDROCHLORIDE 20 MG/ML
INJECTION, SOLUTION EPIDURAL; INFILTRATION; INTRACAUDAL; PERINEURAL PRN
Status: DISCONTINUED | OUTPATIENT
Start: 2020-07-29 | End: 2020-07-29 | Stop reason: SURG

## 2020-07-29 RX ORDER — DIPHENHYDRAMINE HYDROCHLORIDE 50 MG/ML
12.5 INJECTION INTRAMUSCULAR; INTRAVENOUS
Status: DISCONTINUED | OUTPATIENT
Start: 2020-07-29 | End: 2020-07-29 | Stop reason: HOSPADM

## 2020-07-29 RX ORDER — POTASSIUM CHLORIDE 20 MEQ/1
40 TABLET, EXTENDED RELEASE ORAL ONCE
Status: COMPLETED | OUTPATIENT
Start: 2020-07-29 | End: 2020-07-29

## 2020-07-29 RX ORDER — HYDROMORPHONE HYDROCHLORIDE 1 MG/ML
0.1 INJECTION, SOLUTION INTRAMUSCULAR; INTRAVENOUS; SUBCUTANEOUS
Status: DISCONTINUED | OUTPATIENT
Start: 2020-07-29 | End: 2020-07-29 | Stop reason: HOSPADM

## 2020-07-29 RX ORDER — MORPHINE SULFATE 10 MG/ML
5 INJECTION, SOLUTION INTRAMUSCULAR; INTRAVENOUS
Status: COMPLETED | OUTPATIENT
Start: 2020-07-29 | End: 2020-07-29

## 2020-07-29 RX ORDER — INSULIN GLARGINE 100 [IU]/ML
18 INJECTION, SOLUTION SUBCUTANEOUS EVERY EVENING
Status: DISCONTINUED | OUTPATIENT
Start: 2020-07-29 | End: 2020-07-30

## 2020-07-29 RX ORDER — HYDROMORPHONE HYDROCHLORIDE 1 MG/ML
0.2 INJECTION, SOLUTION INTRAMUSCULAR; INTRAVENOUS; SUBCUTANEOUS
Status: DISCONTINUED | OUTPATIENT
Start: 2020-07-29 | End: 2020-07-29 | Stop reason: HOSPADM

## 2020-07-29 RX ORDER — ONDANSETRON 2 MG/ML
4 INJECTION INTRAMUSCULAR; INTRAVENOUS
Status: DISCONTINUED | OUTPATIENT
Start: 2020-07-29 | End: 2020-07-29 | Stop reason: HOSPADM

## 2020-07-29 RX ADMIN — MORPHINE SULFATE 2 MG: 4 INJECTION INTRAVENOUS at 07:32

## 2020-07-29 RX ADMIN — FENTANYL CITRATE 50 MCG: 50 INJECTION, SOLUTION INTRAMUSCULAR; INTRAVENOUS at 19:18

## 2020-07-29 RX ADMIN — SODIUM CHLORIDE, POTASSIUM CHLORIDE, SODIUM LACTATE AND CALCIUM CHLORIDE: 600; 310; 30; 20 INJECTION, SOLUTION INTRAVENOUS at 18:37

## 2020-07-29 RX ADMIN — KETOROLAC TROMETHAMINE 15 MG: 30 INJECTION, SOLUTION INTRAMUSCULAR at 12:11

## 2020-07-29 RX ADMIN — FENTANYL CITRATE 25 MCG: 50 INJECTION, SOLUTION INTRAMUSCULAR; INTRAVENOUS at 19:33

## 2020-07-29 RX ADMIN — ACETAMINOPHEN 1000 MG: 10 INJECTION, SOLUTION INTRAVENOUS at 02:11

## 2020-07-29 RX ADMIN — FENTANYL CITRATE 25 MCG: 50 INJECTION, SOLUTION INTRAMUSCULAR; INTRAVENOUS at 19:38

## 2020-07-29 RX ADMIN — ONDANSETRON 4 MG: 2 INJECTION INTRAMUSCULAR; INTRAVENOUS at 07:42

## 2020-07-29 RX ADMIN — POVIDONE-IODINE 15 ML: 10 SOLUTION TOPICAL at 18:42

## 2020-07-29 RX ADMIN — OXYCODONE HYDROCHLORIDE 10 MG: 5 SOLUTION ORAL at 20:25

## 2020-07-29 RX ADMIN — AMPICILLIN SODIUM AND SULBACTAM SODIUM 3 G: 2; 1 INJECTION, POWDER, FOR SOLUTION INTRAMUSCULAR; INTRAVENOUS at 17:24

## 2020-07-29 RX ADMIN — THERA TABS 1 TABLET: TAB at 06:13

## 2020-07-29 RX ADMIN — FENTANYL CITRATE 25 MCG: 50 INJECTION, SOLUTION INTRAMUSCULAR; INTRAVENOUS at 18:58

## 2020-07-29 RX ADMIN — MAGNESIUM HYDROXIDE 30 ML: 400 SUSPENSION ORAL at 06:13

## 2020-07-29 RX ADMIN — AMPICILLIN SODIUM AND SULBACTAM SODIUM 3 G: 2; 1 INJECTION, POWDER, FOR SOLUTION INTRAMUSCULAR; INTRAVENOUS at 12:11

## 2020-07-29 RX ADMIN — AMPICILLIN SODIUM AND SULBACTAM SODIUM 3 G: 2; 1 INJECTION, POWDER, FOR SOLUTION INTRAMUSCULAR; INTRAVENOUS at 23:35

## 2020-07-29 RX ADMIN — ACETAMINOPHEN 1000 MG: 10 INJECTION, SOLUTION INTRAVENOUS at 09:42

## 2020-07-29 RX ADMIN — MORPHINE SULFATE 2 MG: 4 INJECTION INTRAVENOUS at 03:13

## 2020-07-29 RX ADMIN — OXYCODONE HYDROCHLORIDE 15 MG: 5 TABLET ORAL at 14:38

## 2020-07-29 RX ADMIN — POTASSIUM CHLORIDE 40 MEQ: 1500 TABLET, EXTENDED RELEASE ORAL at 07:33

## 2020-07-29 RX ADMIN — FENTANYL CITRATE 50 MCG: 50 INJECTION, SOLUTION INTRAMUSCULAR; INTRAVENOUS at 20:57

## 2020-07-29 RX ADMIN — OXYCODONE HYDROCHLORIDE 15 MG: 5 TABLET ORAL at 23:35

## 2020-07-29 RX ADMIN — FENTANYL CITRATE 50 MCG: 50 INJECTION, SOLUTION INTRAMUSCULAR; INTRAVENOUS at 19:46

## 2020-07-29 RX ADMIN — FENTANYL CITRATE 25 MCG: 50 INJECTION, SOLUTION INTRAMUSCULAR; INTRAVENOUS at 19:04

## 2020-07-29 RX ADMIN — AMPICILLIN SODIUM AND SULBACTAM SODIUM 3 G: 2; 1 INJECTION, POWDER, FOR SOLUTION INTRAMUSCULAR; INTRAVENOUS at 06:07

## 2020-07-29 RX ADMIN — OMEPRAZOLE 20 MG: 20 CAPSULE, DELAYED RELEASE ORAL at 06:13

## 2020-07-29 RX ADMIN — SENNOSIDES-DOCUSATE SODIUM TAB 8.6-50 MG 2 TABLET: 8.6-5 TAB at 06:13

## 2020-07-29 RX ADMIN — KETOROLAC TROMETHAMINE 15 MG: 30 INJECTION, SOLUTION INTRAMUSCULAR at 19:46

## 2020-07-29 RX ADMIN — OXYCODONE HYDROCHLORIDE 15 MG: 5 TABLET ORAL at 10:31

## 2020-07-29 RX ADMIN — FENTANYL CITRATE 50 MCG: 50 INJECTION, SOLUTION INTRAMUSCULAR; INTRAVENOUS at 21:09

## 2020-07-29 RX ADMIN — ENOXAPARIN SODIUM 40 MG: 40 INJECTION SUBCUTANEOUS at 06:14

## 2020-07-29 RX ADMIN — MORPHINE SULFATE 2 MG: 4 INJECTION INTRAVENOUS at 00:27

## 2020-07-29 RX ADMIN — LIDOCAINE HYDROCHLORIDE 100 MG: 20 INJECTION, SOLUTION EPIDURAL; INFILTRATION; INTRACAUDAL; PERINEURAL at 19:04

## 2020-07-29 RX ADMIN — PROPOFOL 120 MG: 10 INJECTION, EMULSION INTRAVENOUS at 19:04

## 2020-07-29 RX ADMIN — MORPHINE SULFATE 5 MG: 10 INJECTION INTRAVENOUS at 15:46

## 2020-07-29 RX ADMIN — ONDANSETRON 4 MG: 2 INJECTION INTRAMUSCULAR; INTRAVENOUS at 19:46

## 2020-07-29 RX ADMIN — MORPHINE SULFATE 2 MG: 4 INJECTION INTRAVENOUS at 09:34

## 2020-07-29 RX ADMIN — MORPHINE SULFATE 2 MG: 4 INJECTION INTRAVENOUS at 22:01

## 2020-07-29 RX ADMIN — FENTANYL CITRATE 50 MCG: 50 INJECTION, SOLUTION INTRAMUSCULAR; INTRAVENOUS at 20:49

## 2020-07-29 ASSESSMENT — ENCOUNTER SYMPTOMS
FLANK PAIN: 0
CARDIOVASCULAR NEGATIVE: 1
MUSCULOSKELETAL NEGATIVE: 1
CHILLS: 0
PSYCHIATRIC NEGATIVE: 1
DIAPHORESIS: 0
NEUROLOGICAL NEGATIVE: 1
EYES NEGATIVE: 1
GASTROINTESTINAL NEGATIVE: 1
RESPIRATORY NEGATIVE: 1

## 2020-07-29 ASSESSMENT — PAIN SCALES - GENERAL: PAIN_LEVEL: 4

## 2020-07-29 NOTE — DISCHARGE PLANNING
Anticipated Discharge Disposition: Home with HH and wound vac.     Action: SW talked with patient this morning, under the influence of pain medication, but did agree for choice for [facility]. [facility] 2. [facility], and 3. [facility]. TIFFANIE texted Dr. Hood for order and face to face. Asking about anticipated d/c date. Wound vac to be delivered today at bedside at 4:00pm    Barriers to Discharge: HH acceptance, medical clearance.     Plan: Wound vac delivered today 4:00pm. Pending orders/face to face for HH. Choice faxed to [facility] [phone]

## 2020-07-29 NOTE — PROGRESS NOTES
Report received from day RN.    Pt alert and resting in bed. Reports pain 10/10, Medicated for pain relief. POC discussed with pt, especially pain management. Andres cath emptied. Wound vac draining small amount of serosanguineous fluid observed. Pt calls appropriately. Call light and belongings w/n reach. No other needs at this time.

## 2020-07-29 NOTE — DISCHARGE PLANNING
Anticipated Discharge Disposition: Home with wound vac    Action: TIFFANIE received call from [facility]: Oskar this morning, requesting facesheet, surgery notes for wound vac. TIFFANIE sent off paperwork to [facility]: fax [phone].     Barriers to Discharge: wound vac delivery and placement w/medical clearance.     Plan: Follow up in rounds, with [facility], and patient.

## 2020-07-29 NOTE — PROGRESS NOTES
Note to reader: this note follows the APSO format rather than the historical SOAP format. Assessment and plan located at the top of the note for ease of use.    Chief Complaint  69 y.o. 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 Coordinating for redebridement possibly tonight or tomorrow.   Andres draining raulito urine.   Antibiotics per Hospital team.  Will continue to follow to assess need for further debridement- induration posteriorly improving.          Patient seen and examined    7/29 POD #4. Wound vac in place. Tenderness improving in right inguinal canal. Set to get PICC line for abx treatment at home. Wound care to exchange vac 7/29 noticing increased induration, tenderness, new onset pustules and erythema.     7/28. POD #3. Was feeling loopy yesterday upon my evaluation. Wound vac placed evening 7/27. Surrounding tissue sensitive/tender but looking healthy without areas of necrosis. Will continue to monitor with daily exams. Continues to be quite loopy during each examination, discussion- unclear if this is his baseline.     7/26. POD #1. Feeling better than yesterday. Pain present in lower abdomen and scrotum but improved. Cultures pending. On Zosyn and Clindamycin. No fevers. WBC improved. To transfer to floor today.            Review of Systems  Physical Exam   Review of Systems   Constitutional: Negative for chills and diaphoresis.   Cardiovascular: Negative for chest pain.   Genitourinary: Negative for dysuria, flank pain and hematuria.        + scrotal pain/tenderness/swelling     Vitals:    07/28/20 1700 07/28/20 2255 07/29/20 0500 07/29/20 1200   BP: 137/63 134/64 116/55 131/72   Pulse: 78 77 84 73   Resp: 18 18 18 18   Temp: (!) 38.1 °C (100.6 °F) 37.3 °C (99.2 °F) 37.2 °C (99 °F) 36.9 °C (98.5  °F)   TempSrc: Oral Oral Oral Oral   SpO2: 93% 92% 96% 93%   Weight:       Height:         Physical Exam   Constitutional: He is oriented to person, place, and time and well-developed, well-nourished, and in no distress. No distress.   Pulmonary/Chest: Effort normal.   Abdominal: Soft. He exhibits no distension.   Genitourinary:    Genitourinary Comments: Post surgical incision with wound vac in place. Scrotum is diffusely swollen with worsening induration and erythema extending into the inguinal canal, sensitive posteriorly. Exquisitely tender into inguinal canal. No areas of necrosis. Wound vac removed showing new pustules and drainage.    Andres draining raulito colored urine.      Neurological: He is alert and oriented to person, place, and time.   Skin: Skin is warm and dry.   Nursing note and vitals reviewed.       Hematology Chemistry   Lab Results   Component Value Date/Time    WBC 12.0 (H) 07/29/2020 04:03 AM    HEMOGLOBIN 11.3 (L) 07/29/2020 04:03 AM    HEMATOCRIT 37.5 (L) 07/29/2020 04:03 AM    PLATELETCT 394 07/29/2020 04:03 AM     Lab Results   Component Value Date/Time    SODIUM 136 07/29/2020 04:03 AM    POTASSIUM 3.5 (L) 07/29/2020 04:03 AM    CHLORIDE 103 07/29/2020 04:03 AM    CO2 23 07/29/2020 04:03 AM    GLUCOSE 254 (H) 07/29/2020 04:03 AM    BUN 11 07/29/2020 04:03 AM    CREATININE 0.68 07/29/2020 04:03 AM         Labs not explicitly included in this progress note were reviewed by the author.   Radiology/imaging not explicitly included in this progress note was reviewed by the author.     Core Measures

## 2020-07-29 NOTE — PROGRESS NOTES
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  Off insulin drip  Did not need pressors      Interval Problem Update  Reviewed last 24 hour events:  POD#4  Temp 100.6 overnight  Otherwise vital signs stable  Good U OP, Andres draining clear yellow urine  WBC stable at 12 with PMN predominance; stable normocytic anemia  Wound care following, wound vac in place  zosyn 4.5/clindamycin 600 narrowed to unasyn; urine cultures with > 100,000 CFU group B strep, mixed skin jeanine; strep viridans from wound culture, Gram stain many GPC, GPR  Hyperglycemic, long acting reduced due to hypoglycemia yesterday  Pain regimen working, symptoms adequately controlled    Review of Systems  Review of Systems   Constitutional: Positive for malaise/fatigue.   HENT: Negative.    Eyes: Negative.    Respiratory: Negative.    Cardiovascular: Negative.    Gastrointestinal: Negative.    Genitourinary:        Scrotal pain   Musculoskeletal: Negative.    Skin: Negative.    Neurological: Negative.    Endo/Heme/Allergies: Negative.    Psychiatric/Behavioral: Negative.       Vital Signs for last 24 hours   Temp:  [36.7 °C (98.1 °F)-38.1 °C (100.6 °F)] 37.3 °C (99.2 °F)  Pulse:  [67-79] 77  Resp:  [18] 18  BP: (113-137)/(58-68) 134/64  SpO2:  [92 %-98 %] 92 %       Physical Exam   Physical Exam  Vitals signs and nursing note reviewed.   Constitutional:       Appearance: He is not ill-appearing.       Comments: Somnolent and still fatigued, less slurred speech   HENT:      Head: Normocephalic and atraumatic.      Nose: Nose normal.      Mouth/Throat:      Mouth: Mucous membranes are moist.   Eyes:      Extraocular Movements: Extraocular movements intact.      Pupils: Pupils are equal, round, and reactive to light.   Neck:      Musculoskeletal: Normal range of motion.   Cardiovascular:      Rate and Rhythm: Normal rate.   Pulmonary:      Effort: Pulmonary effort is normal.   Abdominal:      General: Abdomen is flat. Bowel sounds are normal.      Palpations: Abdomen is soft.   Genitourinary:     Comments: Marked decrease in scrotal swelling  Incision open and packed tender still in the groin area  Wound vac in place  Musculoskeletal: Normal range of motion.   Skin:     General: Skin is warm.   Neurological:      General: No focal deficit present.      Mental Status: He is alert and oriented to person, place, and time. Mental status is at baseline.   Psychiatric:         Mood and Affect: Mood normal.         Behavior: Behavior normal.         Thought Content: Thought content normal.         Judgment: Judgment normal.       Medications  Current Facility-Administered Medications   Medication Dose Route Frequency Provider Last Rate Last Dose   • acetaminophen (OFIRMEV) injection 1,000 mg  1,000 mg Intravenous Q8HRS Julian Hood M.D.   1,000 mg at 07/29/20 0211   • insulin glargine (Lantus) injection  18 Units Subcutaneous Q EVENING Julian Hood M.D.       • insulin regular (HumuLIN R,NovoLIN R) injection  3-14 Units Subcutaneous Q6HRS Julian Hood M.D.        And   • glucose 4 g chewable tablet 16 g  16 g Oral Q15 MIN PRN Julian Hood M.D.        And   • dextrose 50% (D50W) injection 50 mL  50 mL Intravenous Q15 MIN PRN Julian Hood M.D.       • morphine (pf) 4 MG/ML injection 2 mg  2 mg Intravenous Q2HRS PRN Julian Hood M.D.   2 mg at 07/29/20 0313   • oxyCODONE immediate release  (ROXICODONE) tablet 10 mg  10 mg Oral Q6HRS PRN Julian Hood M.D.   10 mg at 07/28/20 1712   • ampicillin/sulbactam (UNASYN) 3 g in  mL IVPB  3 g Intravenous Q6HRS Julian Hood M.D.   Stopped at 07/28/20 2349   • multivitamin (THERAGRAN) tablet 1 Tab  1 Tab Oral DAILY Julian Hood M.D.   1 Tab at 07/28/20 1159   • hyoscyamine-maalox plus-lidocaine viscous (GI COCKTAIL) oral susp 15 mL  15 mL Oral Q4HRS PRN Julian Hood M.D.       • ketorolac (TORADOL) injection 15 mg  15 mg Intravenous Q6HRS PRN Julian Hood M.D.   15 mg at 07/28/20 2313   • omeprazole (PRILOSEC) capsule 20 mg  20 mg Oral DAILY Dre Rosenbaum M.D.   20 mg at 07/28/20 0507   • enoxaparin (LOVENOX) inj 40 mg  40 mg Subcutaneous DAILY Dre Rosenbaum M.D.   40 mg at 07/28/20 0508   • lactated ringers infusion (BOLUS): BMI greater than 30  30 mL/kg (Ideal) Intravenous Once PRN Dre Rosenbaum M.D.       • senna-docusate (PERICOLACE or SENOKOT S) 8.6-50 MG per tablet 2 Tab  2 Tab Oral BID Dre Rosenbaum M.D.   2 Tab at 07/28/20 1712    And   • polyethylene glycol/lytes (MIRALAX) PACKET 1 Packet  1 Packet Oral QDAY PRN Dre Rosenbaum M.D.   1 Packet at 07/28/20 2311    And   • magnesium hydroxide (MILK OF MAGNESIA) suspension 30 mL  30 mL Oral QDAY PRN Dre Rosenbaum M.D.        And   • bisacodyl (DULCOLAX) suppository 10 mg  10 mg Rectal QDAY PRN Dre Rosenbaum M.D.       • ondansetron (ZOFRAN) syringe/vial injection 4 mg  4 mg Intravenous Q4HRS PRN Dre Dye-Myranda, M.D.   4 mg at 07/27/20 2332   • ondansetron (ZOFRAN ODT) dispertab 4 mg  4 mg Oral Q4HRS CLAYTON Rosenbaum M.D.         Fluids    Intake/Output Summary (Last 24 hours) at 7/29/2020 0452  Last data filed at 7/28/2020 2349  Gross per 24 hour   Intake 1200 ml   Output 1000 ml   Net 200 ml     Laboratory          Recent Labs     07/26/20  0505 07/28/20  0320   SODIUM 138 135   POTASSIUM 3.7 3.5*   CHLORIDE  106 102   CO2 24 24   BUN 17 10   CREATININE 0.53 0.71   CALCIUM 7.9* 7.8*     Recent Labs     07/26/20  0505 07/28/20  0320 07/28/20  1013   ALTSGPT 7 7  --    ASTSGOT 11* 18  --    ALKPHOSPHAT 73 128*  --    TBILIRUBIN 0.6 0.8  --    PREALBUMIN  --   --  4.5*   GLUCOSE 109* 56*  --      Recent Labs     07/26/20  0505 07/28/20  0320   WBC 11.0* 11.1*   NEUTSPOLYS  --  86.80*   LYMPHOCYTES  --  5.40*   MONOCYTES  --  4.90   EOSINOPHILS  --  1.40   BASOPHILS  --  0.50   ASTSGOT 11* 18   ALTSGPT 7 7   ALKPHOSPHAT 73 128*   TBILIRUBIN 0.6 0.8     Recent Labs     07/26/20  0505 07/28/20  0320   RBC 4.34* 4.42*   HEMOGLOBIN 11.2* 11.5*   HEMATOCRIT 35.3* 37.0*   PLATELETCT 247 370     Imaging  No new imaging    Assessment/Plan  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)  Assessment & Plan  7/25 I &D of right hemiscrotum; currently incision still open as packed  Vanc/zosyn and clindamycin -> strep viridans from wound, GBS from the urine -> narrow abx to unasyn 7/28  Adjust pain meds: dc dilaudid, start oxy IR 10, ofirmev ATC, cont morphine and ketorolac  Wound care following, wound vac in place  Discussed fevers with urology- feel confident source controlled, repeat blood and urine cultures  Main concern is that he is chronically on steroids and he may develop adrenal insufficiency but on the flip side steroids worsen his risk of healing especially with how necrotic it was.    Thrombocytopenia (HCC)- (present on admission)  Assessment & Plan  On hydroxyurea as outpt but anemic here so on hold    CAD (coronary artery disease)- (present on admission)  Assessment & Plan  ASA being held in case needs to return to OR    Type 2 diabetes mellitus, with long-term current use of insulin (HCC)- (present on admission)  Assessment & Plan  Reduce lantus 15u QHS due to hypoglycemia  Diabetic diet, high protein, add MVI for wound healing  Glucose within goal range  GI cocktail    Paroxysmal A-fib (HCC)- (present on  admission)  Assessment & Plan  Sinus here  Cont to hold eliquis post op  Cont lovenox subq for DVT  No oozing from packing  Platelets normal    Pancreatic cyst  Assessment & Plan  Tail of pancreas  Will need follow up outpt    SLE (systemic lupus erythematosus related syndrome) (HCC)  Assessment & Plan  High risk of flare  His complements are always low so wont send  Hold imuran and steroids (see above on steroids)     VTE:  Lovenox  Ulcer: PPI  Lines: None    I have performed a physical exam and reviewed and updated ROS and Plan today (7/29/2020). In review of yesterday's note (7/28/2020), there are no changes except as documented above.     Discussed with patient, RN, pharmacy and urology.    This note was generated using voice recognition software which has a chance of producing errors of grammar and content.  I have made every reasonable attempt to find and correct any errors, but it should be expected that some may not be found prior to finalization of this note.  __________  Julian Hood MD  Pulmonary and Critical Care Medicine  [facility]

## 2020-07-29 NOTE — FACE TO FACE
Face to Face Supporting Documentation - Home Health    The encounter with this patient was in whole or in part the primary reason for home health admission.    Date of encounter:   Patient:                    MRN:                       YOB: 2018  Francesco Schulte  [id number]  1950     Home health to see patient for:  Skilled Nursing care for assessment, interventions & education, Wound Care, Physical Therapy evaluation and treatment and Occupational therapy evaluation and treatment    Skilled need for:  Surgical Aftercare scrotum gangrene    Skilled nursing interventions to include:  Home IV infusion therapy and Wound Care    Homebound status evidenced by:  Need the aid of supportive devices such as crutches, canes, wheelchairs or walkers or Needs the assistance of another person in order to leave the home. Leaving home requires a considerable and taxing effort. There is a normal inability to leave the home.    Community Physician to provide follow up care: Malissa Thomson M.D.     Optional Interventions? No      I certify the face to face encounter for this home health care referral meets the CMS requirements and the encounter/clinical assessment with the patient was, in whole, or in part, for the medical condition(s) listed above, which is the primary reason for home health care. Based on my clinical findings: the service(s) are medically necessary, support the need for home health care, and the homebound criteria are met.  I certify that this patient has had a face to face encounter by myself.    Julian Hood M.D. - NPI: [id number]

## 2020-07-29 NOTE — PROGRESS NOTES
No change from morning assessment except reporting better pain relief with Morphine every 2 hours and added oxycodone. Waffle cushion placed for skin protection as patient declines to turn due to discomfort in scrotum and left flank area.  Patient is requesting his MD contact his rheumatologist Dr. Clifford Horan and his oncologist Dr. Ron Sims at [facility] to get his regular medications re-ordered. These are for his blood cancer and his lupus.

## 2020-07-29 NOTE — CARE PLAN
Problem: Venous Thromboembolism (VTW)/Deep Vein Thrombosis (DVT) Prevention:  Goal: Patient will participate in Venous Thrombosis (VTE)/Deep Vein Thrombosis (DVT)Prevention Measures  Outcome: PROGRESSING AS EXPECTED   Pt wears SCD to bilat calf. Enoxaparin on board for VTE prophylaxis.     Problem: Infection  Goal: Will remain free from infection  Outcome: PROGRESSING AS EXPECTED   Pt on IV unasyn. Remains afebrile so far, denies chills.       Problem: Pain Management  Goal: Pain level will decrease to patient's comfort goal  Outcome: PROGRESSING SLOWER THAN EXPECTED   Pt needs IV morphine q 2hrs. Oxycodone on board as well for pain relief.

## 2020-07-29 NOTE — PROGRESS NOTES
0005 Pt attempted to have a BM in the bedpan, but unsuccessful. Decided to use the bathroom over commode. Pt assisted by 2RNs and a CNA to the bathroom and was able to have a large, brown, formed BM. Pt put on 2L supplemental O2 d/t reports of SOB during ambulation. Pt assisted back to bed and medicated for pain. Complete bed linen changed was also done.    0100 Pt's dressing was reinforced due to seal leak alarm observed on wound vac.

## 2020-07-30 LAB
ALBUMIN SERPL BCP-MCNC: 1.7 G/DL (ref 3.2–4.9)
ALBUMIN/GLOB SERPL: 0.5 G/DL
ALP SERPL-CCNC: 176 U/L (ref 30–99)
ALT SERPL-CCNC: 7 U/L (ref 2–50)
ANION GAP SERPL CALC-SCNC: 6 MMOL/L (ref 7–16)
AST SERPL-CCNC: 24 U/L (ref 12–45)
BACTERIA BLD CULT: NORMAL
BACTERIA BLD CULT: NORMAL
BACTERIA SPEC ANAEROBE CULT: ABNORMAL
BACTERIA SPEC ANAEROBE CULT: ABNORMAL
BASOPHILS # BLD AUTO: 0.5 % (ref 0–1.8)
BASOPHILS # BLD: 0.06 K/UL (ref 0–0.12)
BILIRUB SERPL-MCNC: 0.4 MG/DL (ref 0.1–1.5)
BUN SERPL-MCNC: 9 MG/DL (ref 8–22)
CALCIUM SERPL-MCNC: 7.6 MG/DL (ref 8.4–10.2)
CHLORIDE SERPL-SCNC: 102 MMOL/L (ref 96–112)
CO2 SERPL-SCNC: 26 MMOL/L (ref 20–33)
CREAT SERPL-MCNC: 0.72 MG/DL (ref 0.5–1.4)
EOSINOPHIL # BLD AUTO: 0.14 K/UL (ref 0–0.51)
EOSINOPHIL NFR BLD: 1.1 % (ref 0–6.9)
ERYTHROCYTE [DISTWIDTH] IN BLOOD BY AUTOMATED COUNT: 55.7 FL (ref 35.9–50)
GLOBULIN SER CALC-MCNC: 3.5 G/DL (ref 1.9–3.5)
GLUCOSE BLD-MCNC: 145 MG/DL (ref 65–99)
GLUCOSE BLD-MCNC: 159 MG/DL (ref 65–99)
GLUCOSE BLD-MCNC: 200 MG/DL (ref 65–99)
GLUCOSE BLD-MCNC: 201 MG/DL (ref 65–99)
GLUCOSE SERPL-MCNC: 253 MG/DL (ref 65–99)
HCT VFR BLD AUTO: 36.6 % (ref 42–52)
HGB BLD-MCNC: 11.1 G/DL (ref 14–18)
IMM GRANULOCYTES # BLD AUTO: 0.26 K/UL (ref 0–0.11)
IMM GRANULOCYTES NFR BLD AUTO: 2.1 % (ref 0–0.9)
LYMPHOCYTES # BLD AUTO: 0.64 K/UL (ref 1–4.8)
LYMPHOCYTES NFR BLD: 5 % (ref 22–41)
MCH RBC QN AUTO: 25.3 PG (ref 27–33)
MCHC RBC AUTO-ENTMCNC: 30.3 G/DL (ref 33.7–35.3)
MCV RBC AUTO: 83.6 FL (ref 81.4–97.8)
MONOCYTES # BLD AUTO: 0.51 K/UL (ref 0–0.85)
MONOCYTES NFR BLD AUTO: 4 % (ref 0–13.4)
NEUTROPHILS # BLD AUTO: 11.07 K/UL (ref 1.82–7.42)
NEUTROPHILS NFR BLD: 87.3 % (ref 44–72)
NRBC # BLD AUTO: 0 K/UL
NRBC BLD-RTO: 0 /100 WBC
PLATELET # BLD AUTO: 489 K/UL (ref 164–446)
PMV BLD AUTO: 11.4 FL (ref 9–12.9)
POTASSIUM SERPL-SCNC: 4 MMOL/L (ref 3.6–5.5)
PROT SERPL-MCNC: 5.2 G/DL (ref 6–8.2)
RBC # BLD AUTO: 4.38 M/UL (ref 4.7–6.1)
SIGNIFICANT IND 70042: ABNORMAL
SIGNIFICANT IND 70042: NORMAL
SIGNIFICANT IND 70042: NORMAL
SITE SITE: ABNORMAL
SITE SITE: NORMAL
SITE SITE: NORMAL
SODIUM SERPL-SCNC: 134 MMOL/L (ref 135–145)
SOURCE SOURCE: ABNORMAL
SOURCE SOURCE: NORMAL
SOURCE SOURCE: NORMAL
WBC # BLD AUTO: 12.7 K/UL (ref 4.8–10.8)

## 2020-07-30 PROCEDURE — 82962 GLUCOSE BLOOD TEST: CPT | Mod: 91

## 2020-07-30 PROCEDURE — 770006 HCHG ROOM/CARE - MED/SURG/GYN SEMI*

## 2020-07-30 PROCEDURE — 85025 COMPLETE CBC W/AUTO DIFF WBC: CPT

## 2020-07-30 PROCEDURE — A9270 NON-COVERED ITEM OR SERVICE: HCPCS | Performed by: INTERNAL MEDICINE

## 2020-07-30 PROCEDURE — 700102 HCHG RX REV CODE 250 W/ 637 OVERRIDE(OP): Performed by: INTERNAL MEDICINE

## 2020-07-30 PROCEDURE — 80053 COMPREHEN METABOLIC PANEL: CPT

## 2020-07-30 PROCEDURE — 700105 HCHG RX REV CODE 258: Performed by: INTERNAL MEDICINE

## 2020-07-30 PROCEDURE — 99233 SBSQ HOSP IP/OBS HIGH 50: CPT | Performed by: INTERNAL MEDICINE

## 2020-07-30 PROCEDURE — 36415 COLL VENOUS BLD VENIPUNCTURE: CPT

## 2020-07-30 PROCEDURE — 700111 HCHG RX REV CODE 636 W/ 250 OVERRIDE (IP): Performed by: INTERNAL MEDICINE

## 2020-07-30 RX ORDER — MORPHINE SULFATE 10 MG/ML
5 INJECTION, SOLUTION INTRAMUSCULAR; INTRAVENOUS ONCE
Status: COMPLETED | OUTPATIENT
Start: 2020-07-30 | End: 2020-07-30

## 2020-07-30 RX ORDER — LIDOCAINE HYDROCHLORIDE 20 MG/ML
1 JELLY TOPICAL ONCE
Status: DISPENSED | OUTPATIENT
Start: 2020-07-30 | End: 2020-07-31

## 2020-07-30 RX ORDER — DEXTROSE MONOHYDRATE 25 G/50ML
50 INJECTION, SOLUTION INTRAVENOUS
Status: DISCONTINUED | OUTPATIENT
Start: 2020-07-30 | End: 2020-08-07

## 2020-07-30 RX ORDER — INSULIN GLARGINE 100 [IU]/ML
22 INJECTION, SOLUTION SUBCUTANEOUS EVERY EVENING
Status: DISCONTINUED | OUTPATIENT
Start: 2020-07-30 | End: 2020-08-07

## 2020-07-30 RX ORDER — ACETAMINOPHEN 10 MG/ML
1000 INJECTION, SOLUTION INTRAVENOUS EVERY 8 HOURS
Status: DISCONTINUED | OUTPATIENT
Start: 2020-07-30 | End: 2020-08-01

## 2020-07-30 RX ORDER — LIDOCAINE HYDROCHLORIDE 40 MG/ML
10 SOLUTION TOPICAL PRN
Status: DISCONTINUED | OUTPATIENT
Start: 2020-07-30 | End: 2020-07-30

## 2020-07-30 RX ADMIN — INSULIN LISPRO 4 UNITS: 100 INJECTION, SOLUTION INTRAVENOUS; SUBCUTANEOUS at 11:29

## 2020-07-30 RX ADMIN — MORPHINE SULFATE 2 MG: 4 INJECTION INTRAVENOUS at 22:11

## 2020-07-30 RX ADMIN — OMEPRAZOLE 20 MG: 20 CAPSULE, DELAYED RELEASE ORAL at 05:52

## 2020-07-30 RX ADMIN — MORPHINE SULFATE 2 MG: 4 INJECTION INTRAVENOUS at 06:02

## 2020-07-30 RX ADMIN — ENOXAPARIN SODIUM 40 MG: 40 INJECTION SUBCUTANEOUS at 05:52

## 2020-07-30 RX ADMIN — SENNOSIDES-DOCUSATE SODIUM TAB 8.6-50 MG 2 TABLET: 8.6-5 TAB at 05:52

## 2020-07-30 RX ADMIN — OXYCODONE HYDROCHLORIDE 15 MG: 5 TABLET ORAL at 09:42

## 2020-07-30 RX ADMIN — MORPHINE SULFATE 2 MG: 4 INJECTION INTRAVENOUS at 12:51

## 2020-07-30 RX ADMIN — KETOROLAC TROMETHAMINE 15 MG: 30 INJECTION, SOLUTION INTRAMUSCULAR at 08:22

## 2020-07-30 RX ADMIN — SENNOSIDES-DOCUSATE SODIUM TAB 8.6-50 MG 2 TABLET: 8.6-5 TAB at 17:08

## 2020-07-30 RX ADMIN — MORPHINE SULFATE 2 MG: 4 INJECTION INTRAVENOUS at 09:43

## 2020-07-30 RX ADMIN — INSULIN LISPRO 4 UNITS: 100 INJECTION, SOLUTION INTRAVENOUS; SUBCUTANEOUS at 17:23

## 2020-07-30 RX ADMIN — MORPHINE SULFATE 2 MG: 4 INJECTION INTRAVENOUS at 08:22

## 2020-07-30 RX ADMIN — MORPHINE SULFATE 2 MG: 4 INJECTION INTRAVENOUS at 19:39

## 2020-07-30 RX ADMIN — INSULIN LISPRO 3 UNITS: 100 INJECTION, SOLUTION INTRAVENOUS; SUBCUTANEOUS at 11:30

## 2020-07-30 RX ADMIN — AMPICILLIN SODIUM AND SULBACTAM SODIUM 3 G: 2; 1 INJECTION, POWDER, FOR SOLUTION INTRAMUSCULAR; INTRAVENOUS at 12:51

## 2020-07-30 RX ADMIN — MORPHINE SULFATE 2 MG: 4 INJECTION INTRAVENOUS at 17:08

## 2020-07-30 RX ADMIN — ACETAMINOPHEN 1000 MG: 10 INJECTION, SOLUTION INTRAVENOUS at 22:15

## 2020-07-30 RX ADMIN — OXYCODONE HYDROCHLORIDE 15 MG: 5 TABLET ORAL at 22:52

## 2020-07-30 RX ADMIN — AMPICILLIN SODIUM AND SULBACTAM SODIUM 3 G: 2; 1 INJECTION, POWDER, FOR SOLUTION INTRAMUSCULAR; INTRAVENOUS at 05:52

## 2020-07-30 RX ADMIN — AMPICILLIN SODIUM AND SULBACTAM SODIUM 3 G: 2; 1 INJECTION, POWDER, FOR SOLUTION INTRAMUSCULAR; INTRAVENOUS at 17:10

## 2020-07-30 RX ADMIN — INSULIN LISPRO 4 UNITS: 100 INJECTION, SOLUTION INTRAVENOUS; SUBCUTANEOUS at 17:20

## 2020-07-30 RX ADMIN — THERA TABS 1 TABLET: TAB at 05:52

## 2020-07-30 RX ADMIN — OXYCODONE HYDROCHLORIDE 15 MG: 5 TABLET ORAL at 13:54

## 2020-07-30 RX ADMIN — ACETAMINOPHEN 1000 MG: 10 INJECTION, SOLUTION INTRAVENOUS at 12:26

## 2020-07-30 ASSESSMENT — ENCOUNTER SYMPTOMS
EYES NEGATIVE: 1
RESPIRATORY NEGATIVE: 1
GASTROINTESTINAL NEGATIVE: 1
MUSCULOSKELETAL NEGATIVE: 1
NEUROLOGICAL NEGATIVE: 1
CARDIOVASCULAR NEGATIVE: 1
PSYCHIATRIC NEGATIVE: 1

## 2020-07-30 NOTE — CARE PLAN
Problem: Pain Management  Goal: Pain level will decrease to patient's comfort goal  Outcome: PROGRESSING AS EXPECTED    Problem: Knowledge Deficit  Goal: Knowledge of disease process/condition, treatment plan, diagnostic tests, and medications will improve  Outcome: PROGRESSING AS EXPECTED  Goal: Knowledge of the prescribed therapeutic regimen will improve  Outcome: PROGRESSING AS EXPECTED  Patient and wife aware of impending surgery.  Pain management improved higher dose of oral medications and IV meds for breakthrough pain.

## 2020-07-30 NOTE — ANESTHESIA QCDR
2019 [facility] Clinical Data Registry (for Quality Improvement)     Postoperative nausea/vomiting risk protocol (Adult = 18 yrs and Pediatric 3-17 yrs)- (430 and 463)  General inhalation anesthetic (NOT TIVA) with PONV risk factors: No  Provision of anti-emetic therapy with at least 2 different classes of agents: N/A  Patient DID NOT receive anti-emetic therapy and reason is documented in Medical Record: N/A    Multimodal Pain Management- (477)  Non-emergent surgery AND patient age >= 18: No  Use of Multimodal Pain Management, two or more drugs and/or interventions, NOT including systemic opioids:   Exception: Documented allergy to multiple classes of analgesics:     Smoking Abstinence (404)  Patient is current smoker (cigarette, pipe, e-cig, marijuanna): No  Elective Surgery:   Abstinence instructions provided prior to day of surgery:   Patient abstained from smoking on day of surgery:     Pre-Op Beta-Blocker in Isolated CABG (44)  Isolated CABG AND patient age >= 18: No  Beta-blocker admin within 24 hours of surgical incision:   Exception:of medical reason(s) for not administering beta blocker within 24 hours prior to surgical incision (e.g., not  indicated,other medical reason):     PACU assessment of acute postoperative pain prior to Anesthesia Care End- Applies to Patients Age = 18- (ABG7)  Initial PACU pain score is which of the following: < 7/10  Patient unable to report pain score: N/A    Post-anesthetic transfer of care checklist/protocol to PACU/ICU- (426 and 427)  Upon conclusion of case, patient transferred to which of the following locations: PACU/Non-ICU  Use of transfer checklist/protocol: Yes  Exclusion: Service Performed in Patient Hospital Room (and thus did not require transfer): N/A  Unplanned admission to ICU related to anesthesia service up through end of PACU care- (MD51)  Unplanned admission to ICU (not initially anticipated at anesthesia start time): No

## 2020-07-30 NOTE — ANESTHESIA PREPROCEDURE EVALUATION
Relevant Problems   NEURO   (+) History of ST elevation myocardial infarction (STEMI)   (+) History of stroke- old right parietal/occipital      CARDIAC   (+) CAD (coronary artery disease)   (+) Carotid stenosis, 90% right carotid   (+) Paroxysmal A-fib (HCC)   (+) STEMI (ST elevation myocardial infarction) (HCC)   (+) Stenosis of right carotid artery-Right CEA 3/21/18      ENDO   (+) Type 2 diabetes mellitus, with long-term current use of insulin (HCC)      Other   (+) Polycythemia vera (HCC)       Physical Exam    Airway   Mallampati: II  TM distance: >3 FB  Neck ROM: full       Cardiovascular - normal exam  Rhythm: regular  Rate: normal  (-) murmur     Dental - normal exam           Pulmonary - normal exam  Breath sounds clear to auscultation     Abdominal    Neurological - normal exam                 Anesthesia Plan    ASA 3- EMERGENT   ASA physical status 3 criteria: CAD/stents (> 3 months)ASA physical status emergent criteria: sepsis    Plan - general       Airway plan will be LMA      Plan Factors:   Patient was not previously instructed to abstain from smoking on day of procedure.  Patient did not smoke on day of procedure.      Induction: intravenous    Postoperative Plan: Postoperative administration of opioids is intended.        Informed Consent:    Anesthetic plan and risks discussed with patient and spouse.    Use of blood products discussed with: patient and spouse whom consented to blood products.

## 2020-07-30 NOTE — ANESTHESIA POSTPROCEDURE EVALUATION
Patient: Francesco Schulte    Procedure Summary     Date:  07/29/20 Room / Location:   OR  / SURGERY [facility]    Anesthesia Start:  1858 Anesthesia Stop:  2006    Procedure:  EXPLORATION, SCROTUM - FOR I & D OF SCROTAL AND  GROIN WOUND (Right Scrotum) Diagnosis:  (right scrotal abcess)    Surgeon:  Donta Viera M.D. Responsible Provider:  Marvin Powers M.D.    Anesthesia Type:  general ASA Status:  3 - Emergent          Final Anesthesia Type: general  Last vitals  BP   Blood Pressure : 147/85    Temp   36.5 °C (97.7 °F)    Pulse   Pulse: 84   Resp   16    SpO2   100 %      Anesthesia Post Evaluation    Patient location during evaluation: PACU  Patient participation: complete - patient participated  Level of consciousness: awake and alert  Pain score: 4    Airway patency: patent  Anesthetic complications: no  Cardiovascular status: hemodynamically stable  Respiratory status: acceptable  Hydration status: euvolemic    PONV: none           Nurse Pain Score: 4 (NPRS)

## 2020-07-30 NOTE — PROGRESS NOTES
Pt arrived from Pacu Dept via hospital bed, accompanied by 2 RN. Pt drowsy, alert and  coherent . Pt updated on POC- Pain mgm't., Meds and use of call light for needs and assistance/ pt agree. Call light w/. In easy reach. Will monitor pt.

## 2020-07-30 NOTE — THERAPY
"Missed Therapy     Patient Name: Francesco Schulte  Age:  69 y.o., Sex:  male  Medical Record #: [id number]  Today's Date: 7/30/2020    Discussed missed therapy evaluation with nursing. Pt refused out of bed activity , ADL eval or assessment of his functional mobility - due to \" I'm not moving today, period\" Will eval as he is able to tolerate.      "

## 2020-07-30 NOTE — ANESTHESIA TIME REPORT
Anesthesia Start and Stop Event Times     Date Time Event    7/29/2020 1851 Ready for Procedure     1858 Anesthesia Start     2006 Anesthesia Stop        Responsible Staff  07/29/20    Name Role Begin End    Marvin Powers M.D. Anesth 1858 2006        Preop Diagnosis (Free Text):  Pre-op Diagnosis     right scrotal abcess        Preop Diagnosis (Codes):    Post op Diagnosis  Scrotal abscess      Premium Reason  A. 3PM - 7AM    Comments: ASA 3E (h/o CAD, sepsis)

## 2020-07-30 NOTE — CARE PLAN
Problem: Safety  Goal: Will remain free from injury  Outcome: PROGRESSING AS EXPECTED     Problem: Pain Management  Goal: Pain level will decrease to patient's comfort goal  Outcome: PROGRESSING SLOWER THAN EXPECTED   Pt. Refused to let me assess abdomen and groin area due to pain.

## 2020-07-30 NOTE — WOUND TEAM
[facility]  Inpatient Services  Wound and Skin Care Progress note    Admission Date: 7/25/2020     Last order of IP CONSULT TO WOUND CARE was found on 7/26/2020 from Hospital Encounter on 7/25/2020     HPI, PMH, SH: Reviewed    Unit where seen by Wound Team: 2215/01     WOUND CONSULT/FOLLOW-UP RELATED TO:  NPWT Change    Self Report / Pain Level:  Premedicated with PO and morphine, pain with touching skin and performing drsg care       OBJECTIVE:  Knee raised and bent outwards, SCDs, pressure redistribution mattress, drsg reinforced    WOUND TYPE, LOCATION, CHARACTERISTICS (Pressure Injuries: location, stage, POA or date identified)  Wound 07/25/20 Full Thickness Wound Scrotum Right penrose drain & sutures (Active)      07/29/20 1600   Site Assessment Red;Yellow    Periwound Assessment Red;Induration;Edema    Margins Defined edges    Closure Secondary intention    Drainage Amount Small    Drainage Description Serosanguineous    Treatments Cleansed    Wound Cleansing Normal Saline Irrigation    Periwound Protectant Not Applicable    Dressing Cleansing/Solutions Not Applicable    Dressing Options Moist Roll Gauze;Mepitel One;Absorbent Abdominal Pad    Dressing Changed Changed    Dressing Status Intact    Dressing Change/Treatment Frequency Daily, and As Needed    NEXT Dressing Change/Treatment Date 07/30/20    NEXT Weekly Photo (Inpatient Only) 07/31/20    Non-staged Wound Description Full thickness    Wound Length (cm) 7 cm    Wound Width (cm) 2 cm    Wound Depth (cm) 4 cm    Wound Surface Area (cm^2) 14 cm^2    Wound Volume (cm^3) 56 cm^3    Wound Healing % -26    Undermining (cm) 4.3 cm    Undermining of Wound, 1st Location From 9 o'clock;To 10 o'clock    Undermining (cm) - 2nd location 3.4 cm 07/27/20 1900   Undermining of Wound, 2nd Location From 2 o'clock;To 3 o'clock 07/27/20 1900   Shape irregular    Wound Odor None    Exposed Structures Sutures 07/27/20 1900   Number of days: 4       Wound  "07/29/20 Pressure Injury Buttocks Left DTI 7/29 (Active)         Site Assessment Purple    Periwound Assessment Satellite lesions    Margins Defined edges    Drainage Amount None    Wound Cleansing Normal Saline Irrigation    Periwound Protectant Antifungal Therapy    NEXT Dressing Change/Treatment Date 07/29/20    NEXT Weekly Photo (Inpatient Only) 08/05/20    Pressure Injury Stage DTPI    Wound Length (cm) 0.5 cm 07/29/20 1600   Wound Width (cm) 5 cm 07/29/20 1600   Wound Surface Area (cm^2) 2.5 cm^2 07/29/20 1600   Tunneling (cm) 0 cm 07/29/20 1600   Undermining (cm) 0 cm 07/29/20 1600   Shape linear    Wound Odor None    Pulses N/A    Exposed Structures None    Number of days: 0          Vascular:    JW:   No results found.      Lab Values:    Lab Results   Component Value Date/Time    WBC 12.0 (H) 07/29/2020 04:03 AM    RBC 4.47 (L) 07/29/2020 04:03 AM    HEMOGLOBIN 11.3 (L) 07/29/2020 04:03 AM    HEMATOCRIT 37.5 (L) 07/29/2020 04:03 AM    CREACTPROT 0.58 07/01/2020 07:03 AM    SEDRATEWES 15 07/01/2020 07:03 AM    HBA1C 8.6 (A) 01/07/2020 07:14 AM            Culture Results show:  Recent Results (from the past 720 hour(s))   CULTURE WOUND W/ GRAM STAIN    Collection Time: 07/25/20  8:30 PM    Specimen: Wound   Result Value Ref Range    Significant Indicator POS (POS)     Source WND     Site Scrotal Abscess     Culture Result - (A)     Gram Stain Result (A)      Many Gram positive cocci.  Moderate Gram positive rods.      Culture Result Viridans Streptococcus  Heavy growth   (A)          INTERVENTIONS BY WOUND TEAM:  Soaked drsg, removed sutures from scrotal wound, soaked VAC foam more. Removed drsg. Cleaned wound with NS, dried. Attempted to probe undermined area inguinal area, not as much present. Discussion with BRINDA Self about changes to wound bed. Recommended I&D. Loosely filled wound bed with 2\" cece moistened with NS. Slightly secured with mepitel and then stretch briefs and abd pad. Antifungal barrier " paste ordered.    Interdisciplinary consultation: Patient, Bedside RN, BRINDA Self       EVALUATION: Pt's wound worsened, pustules present R distal near where penrose was removed. Induration worsened to R inguinal area. Pt has linear DTI to L buttock. Skin care ordered. He has rash with satellite lesions to buttocks, antifungal ordered.   Goals: Steady decrease in wound area and depth weekly.    NURSING PLAN OF CARE ORDERS (X):     Dressing changes: Continue previous Dressing Maintenance orders:  On hold      See new Dressing Maintenance orders:   Waiting for after Sx    Skin care: See Skin Care orders:        Rectal tube care: See Rectal Tube Care orders:      Other orders:           WOUND TEAM PLAN OF CARE:   Dressing changes by wound team:          Follow up 1-2 times weekly:               Follow up 3 times weekly:                NPWT change 3 times weekly:     Follow up as needed:   x    Other (explain):     Anticipated discharge plans:   LTACH:        SNF/Rehab:                  Home Care:           Outpatient Wound Center:            Self Care:           Other: unsure of needs @ this time, pt back to Sx

## 2020-07-30 NOTE — PROGRESS NOTES
Received report from night RN. Assumed care of pt. Pt. A&O x 4, requesting pain medication for stabbing pain in groin, pt. States pain is 8 on scale of 0-10. Refused to let me listen/assess abdomen or assess groin due to pain.

## 2020-07-30 NOTE — OR NURSING
"1956 To PACU from OR via bed s/p I &D of right side scrotum. Pt sleeping,  respirations spontaneous and non-labored. Surgical dressings to scrotum. Andres catheter present.     2010 Pt rouses to verbal stimuli. Pt has no complaints.    2025 Pt disoriented, reassurance and orientation provided. Pt reports \"a lot of pain\" to his scrotum. Pt denies nausea. Oral pain medication administered. Pt encouraged to rest.    2035 Pt's wife updated.    2040 Pt resting quietly with his eyes closed.    2050 Pt remains disoriented. Reports severe pain, Iv pain medication administered.    2055 Pt reports 8/10 intolerable pain, IV pain medication in use. Pt does report that his pain has improved since initiation of IV pain medication.     2110 Pt reports 2/10 tolerable pain.     2122 Report to [facility] JOSLYN Antonio.  "

## 2020-07-30 NOTE — OP REPORT
Immediate Post-Operative Note    PLEASE COMPLETE ALL ELEMENTS OF THIS REQUIRED FORM IMMEDIATELY FOLLOWING THE COMPLETION OF ANY INVASIVE PROCEDURE.      Postop Diagnosis: Fourniers gangrene  Procedure: I and D of scrotum    Surgeon: Donta Viera M.D.    Specimen:     Estimated Blood Loss: none     Findings: small amount undrained purulence. Some necrotic tissue      7/29/2020 7:54 PM Donta Viera

## 2020-07-30 NOTE — PROGRESS NOTES
2 RN skin assessment done; skin not WDL. See Wound flowsheet.Both elbow slightly red, skin intact(covered w/. Mepilex). Right groin area with Abd dressing, supported w/. Disposable underwear.

## 2020-07-30 NOTE — DISCHARGE PLANNING
Anticipated Discharge Disposition: Home with HH and wound vac    Action: Wound vac delivered at bedside. TIFFANIE requested order from Dr. Hood this morning. Choice already sent out to [facility]    Barriers to Discharge: HH order, HH acceptance.    Plan: Follow up with [facility]

## 2020-07-30 NOTE — OP REPORT
DATE OF SERVICE:  07/29/2020    PREOPERATIVE DIAGNOSIS:  Persistent Landon's gangrene.    POSTOPERATIVE DIAGNOSIS:  Persistent Landon's gangrene.    PROCEDURE:  Incision and debridement of scrotum.    OPERATIVE REPORT:  Patient was brought to the operating room, was given a   general anesthetic.  He was placed in lithotomy position.  Lower abdomen,   penis, and scrotum were prepped and draped in the usual sterile fashion.  It   was noted he had some induration or erythema going up to the right canal.  He   had 2 open wounds in the scrotum.  The testicle was on the cord, was intact.    There was some dependent purulent material in the scrotum.  There was number   of areas of some superficial necrotic tissue.  I was able to identify 3 small   undrained pockets of pus, 2 in the scrotum and 1 up towards the inguinal   canal.  We worked to palpably disrupt any areas that were causing blockage to   prevent purulent material from being released.  I debrided the necrotic tissue   that we could find with sharp debridement and cautery.  The more dependent   opening in the scrotum was made to allow drainage.  With debridement completed   and satisfied there was no undrained pockets of purulence, we then did a 3 L   washout with Pulsavac.  A normal saline wet-to-dry dressing was then applied.    He was sent to recovery in stable condition.    FINAL DIAGNOSIS:  Status post followup incision and debridement of Landon's   gangrene.       ____________________________________     MD DOMINIQUE Cardozo / DEANNA    DD:  07/29/2020 19:58:49  DT:  07/29/2020 23:51:38    D#:  [id number]  Job#:  [id number]

## 2020-07-30 NOTE — ANESTHESIA PROCEDURE NOTES
Airway    Date/Time: 7/29/2020 7:05 PM  Performed by: Marvin Powers M.D.  Authorized by: Marvin Powers M.D.     Location:  OR  Urgency:  Elective  Difficult Airway: No    Indications for Airway Management:  Anesthesia      Spontaneous Ventilation: absent    Sedation Level:  Deep  Preoxygenated: Yes    Mask Difficulty Assessment:  1 - vent by mask  Final Airway Type:  Supraglottic airway  Final Supraglottic Airway:  Standard LMA    SGA Size:  5  Number of Attempts at Approach:  1  Number of Other Approaches Attempted:  0

## 2020-07-30 NOTE — CARE PLAN
Problem: Communication  Goal: The ability to communicate needs accurately and effectively will improve  Outcome: PROGRESSING AS EXPECTED  Intervention: Educate patient and significant other/support system about the plan of care, procedures, treatments, medications and allow for questions  Note: Patient/ family member updated on Plan Of Care and Nurses communications with Doctors.      Problem: Safety  Goal: Will remain free from falls  Outcome: PROGRESSING AS EXPECTED  Intervention: Implement fall precautions  Flowsheets (Taken 7/30/2020 0016)  Environmental Precautions:   Treaded Slipper Socks on Patient   Bed in Low Position  IV Pole on Same Side of Bed as Bathroom: Yes  Note: Patient will be free from injury or fall incident- instruction for use of call light given. Call light placed w/. In easy reach.     Problem: Venous Thromboembolism (VTW)/Deep Vein Thrombosis (DVT) Prevention:  Goal: Patient will participate in Venous Thrombosis (VTE)/Deep Vein Thrombosis (DVT)Prevention Measures  Outcome: PROGRESSING AS EXPECTED  Intervention: Encourage patient to perform ankle flex, foot rotation, and knee flex exercises in addition to other prophylatic measures every hour while awake  Note: SCD on.

## 2020-07-30 NOTE — PROGRESS NOTES
"Spoke with Manju, student nurse and Zohaib, primary RN re: plan for Mr Schulte for line placement. Yesterday, 7/29, this RN spoke with pt and pt refused midline placement at bedside d/t wound care coming later that day and that causes him \"lots of pain and anxiety\" and he \"may be rushed to the OR\" so he preferred to not have any other procedures done at that time. Relayed this information to JOSLYN Jovel who stated the pt had good IV access in place at this time and additional line was not needed at this time. As of today there is not a plan for discharge. Also waiting for ID consult to verify type of ABX, duration of ABX, and verification of correct line being needed. Asked Manju to inform VAT of any updates and when line placement is needed and a VAT nurse would be available for line placement.  "

## 2020-07-30 NOTE — PROGRESS NOTES
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 male who presented 7/25/2020 with hx of CAD s/p PCI, pAF on eliquis, HTN, SLE on imuran & prednisone, polycytemia on hydroxyurea, recurrent rash on legs and buttocks followed by dermatology, recurrent cysts on his back needing I&D, Type II DM not well controlled insulin dependent (A1c 8.6%), has had a rash on his inner thigh that he is following up with Dermatology  Required insulin drip on admission    7/25: I&D right hemiscrotum  (Dr Power)  7/29: Further debridement (DR Donta Viera)      Interval Problem Update  Reviewed last 24 hour events:    Small vesicles on the scrotum were noted with ongoing low-grade fevers, Back to the OR yesterday for further debridement,   POD#1 scrotal debridement, small amount of undrained purulence and necrotic tissue was irrigated and debrided    Afebrile last 24 hours  2L NC, satting 99%  500cc UOP, Andres draining clear yellow urine  WBC stable at 12 with PMN predominance; stable normocytic anemia  Wound care following, Penrose in place, No wound vac  Zosyn/clinda (7/25-7/28), Unasyn (7/28 - ); urine cultures with > 100,000 CFU group B strep, mixed skin jeanine; strep viridans from wound culture  Still hyperglycemic, FSBG 167 253, long-acting insulin increased yesterday  Pain regimen working, symptoms adequately controlled    Review of Systems  Review of Systems   Constitutional: Positive for malaise/fatigue.   HENT: Negative.    Eyes: Negative.    Respiratory: Negative.    Cardiovascular: Negative.    Gastrointestinal: Negative.    Genitourinary:        Scrotal pain   Musculoskeletal: Negative.    Skin: Negative.    Neurological: Negative.    Endo/Heme/Allergies: Negative.    Psychiatric/Behavioral: Negative.       Vital Signs for last 24 hours   Temp:  [36.5 °C (97.7 °F)-37.1 °C (98.8 °F)] 37.1 °C (98.8  °F)  Pulse:  [71-95] 77  Resp:  [16-18] 18  BP: (125-178)/(64-97) 125/67  SpO2:  [93 %-100 %] 99 %       Physical Exam   Physical Exam  Vitals signs and nursing note reviewed.   Constitutional:       Appearance: He is not ill-appearing.      Comments: More alert, speech coherent, less slurred   HENT:      Head: Normocephalic and atraumatic.      Nose: Nose normal.      Mouth/Throat:      Mouth: Mucous membranes are moist.   Eyes:      Extraocular Movements: Extraocular movements intact.      Pupils: Pupils are equal, round, and reactive to light.   Neck:      Musculoskeletal: Normal range of motion.   Cardiovascular:      Rate and Rhythm: Normal rate.   Pulmonary:      Effort: Pulmonary effort is normal.   Abdominal:      General: Abdomen is flat. Bowel sounds are normal.      Palpations: Abdomen is soft.   Genitourinary:     Comments: Marked decrease in scrotal swelling  Penrose in place  No wound vac  Incision open and packed tender still in the groin area  Musculoskeletal: Normal range of motion.   Skin:     General: Skin is warm.   Neurological:      General: No focal deficit present.      Mental Status: He is alert and oriented to person, place, and time. Mental status is at baseline.   Psychiatric:         Mood and Affect: Mood normal.         Behavior: Behavior normal.         Thought Content: Thought content normal.         Judgment: Judgment normal.       Medications  Current Facility-Administered Medications   Medication Dose Route Frequency Provider Last Rate Last Dose   • insulin glargine (Lantus) injection  18 Units Subcutaneous Q EVENING Julian Hood M.D.   Stopped at 07/29/20 1800   • oxyCODONE immediate-release (ROXICODONE) tablet 15 mg  15 mg Oral Q4HRS PRN Julian Hood M.D.   15 mg at 07/29/20 2335   • insulin regular (HumuLIN R,NovoLIN R) injection  3-14 Units Subcutaneous 4X/DAY ACHS Julian Hood M.D.   3 Units at 07/30/20 0636    And   • glucose 4 g chewable tablet 16 g  16 g Oral  Q15 MIN PRN Julian Hood M.D.        And   • dextrose 50% (D50W) injection 50 mL  50 mL Intravenous Q15 MIN PRN Julian Hood M.D.       • miconazole 2%-zinc oxide (Steven) topical cream   Topical BID Julian Hood M.D.       • morphine (pf) 4 MG/ML injection 2 mg  2 mg Intravenous Q2HRS PRN Julian Hood M.D.   2 mg at 07/30/20 0602   • ampicillin/sulbactam (UNASYN) 3 g in  mL IVPB  3 g Intravenous Q6HRS Julian Hood M.D.   Stopped at 07/30/20 0622   • multivitamin (THERAGRAN) tablet 1 Tab  1 Tab Oral DAILY Julian Hood M.D.   1 Tab at 07/30/20 0552   • hyoscyamine-maalox plus-lidocaine viscous (GI COCKTAIL) oral susp 15 mL  15 mL Oral Q4HRS PRN Julian Hood M.D.       • ketorolac (TORADOL) injection 15 mg  15 mg Intravenous Q6HRS PRN Julian Hood M.D.   15 mg at 07/29/20 1946   • omeprazole (PRILOSEC) capsule 20 mg  20 mg Oral DAILY Dre Rosenbaum M.D.   20 mg at 07/30/20 0552   • enoxaparin (LOVENOX) inj 40 mg  40 mg Subcutaneous DAILY Dre Rosenbaum M.D.   40 mg at 07/30/20 0552   • lactated ringers infusion (BOLUS): BMI greater than 30  30 mL/kg (Ideal) Intravenous Once PRN Dre Rosenbaum M.D.       • senna-docusate (PERICOLACE or SENOKOT S) 8.6-50 MG per tablet 2 Tab  2 Tab Oral BID Dre Rosenbaum M.D.   2 Tab at 07/30/20 0552    And   • polyethylene glycol/lytes (MIRALAX) PACKET 1 Packet  1 Packet Oral QDAY PRN Dre Rosenbaum M.D.   1 Packet at 07/28/20 2311    And   • magnesium hydroxide (MILK OF MAGNESIA) suspension 30 mL  30 mL Oral QDAY PRN Dre Rosenbaum M.D.   30 mL at 07/29/20 0613    And   • bisacodyl (DULCOLAX) suppository 10 mg  10 mg Rectal QDAY PRSENDY Rosenbaum M.D.       • ondansetron (ZOFRAN) syringe/vial injection 4 mg  4 mg Intravenous Q4HRS PRSENDY Rosenbaum M.D.   4 mg at 07/29/20 1946   • ondansetron (ZOFRAN ODT) dispertab 4 mg  4 mg Oral Q4HRS PRSENDY Rosenbaum M.D.          Fluids    Intake/Output Summary (Last 24 hours) at 7/30/2020 0739  Last data filed at 7/30/2020 0622  Gross per 24 hour   Intake 1850 ml   Output 520 ml   Net 1330 ml     Laboratory          Recent Labs     07/28/20 0320 07/29/20  0403 07/30/20 0217   SODIUM 135 136 134*   POTASSIUM 3.5* 3.5* 4.0   CHLORIDE 102 103 102   CO2 24 23 26   BUN 10 11 9   CREATININE 0.71 0.68 0.72   CALCIUM 7.8* 7.9* 7.6*     Recent Labs     07/28/20  0320 07/28/20  1013 07/29/20  0403 07/30/20  0217   ALTSGPT 7  --  8 7   ASTSGOT 18  --  13 24   ALKPHOSPHAT 128*  --  142* 176*   TBILIRUBIN 0.8  --  0.6 0.4   PREALBUMIN  --  4.5*  --   --    GLUCOSE 56*  --  254* 253*     Recent Labs     07/28/20 0320 07/29/20  0403 07/30/20 0217   WBC 11.1* 12.0* 12.7*   NEUTSPOLYS 86.80* 89.40* 87.30*   LYMPHOCYTES 5.40* 3.80* 5.00*   MONOCYTES 4.90 3.90 4.00   EOSINOPHILS 1.40 1.10 1.10   BASOPHILS 0.50 0.30 0.50   ASTSGOT 18 13 24   ALTSGPT 7 8 7   ALKPHOSPHAT 128* 142* 176*   TBILIRUBIN 0.8 0.6 0.4     Recent Labs     07/28/20 0320 07/29/20  0403 07/30/20 0217   RBC 4.42* 4.47* 4.38*   HEMOGLOBIN 11.5* 11.3* 11.1*   HEMATOCRIT 37.0* 37.5* 36.6*   PLATELETCT 370 394 489*     Imaging  No new imaging    Assessment/Plan  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    Thrombocytopenia (HCC)- (present on admission)  Assessment & Plan  On hydroxyurea as outpt but anemic here so on hold  Stable, monitor    CAD  (coronary artery disease)- (present on admission)  Assessment & Plan  ASA being held in case needs to return to OR    Type 2 diabetes mellitus, with long-term current use of insulin (HCC)- (present on admission)  Assessment & Plan  INCREASE lantus, add premeal, cont high SS  Diabetic diet, high protein, MVI for wound healing    Paroxysmal A-fib (HCC)- (present on admission)  Assessment & Plan  Sinus here  Cont to hold eliquis post op  Cont lovenox subq for DVT  No oozing from packing  Platelets normal    Pancreatic cyst  Assessment & Plan  Tail of pancreas  Will need follow up outpt    SLE (systemic lupus erythematosus related syndrome) ([facility])  Assessment & Plan  High risk of flare  His complements are always low so wont send  Hold imuran and steroids (see above on steroids)     VTE:  Lovenox  Ulcer: PPI  Lines: None    I have performed a physical exam and reviewed and updated ROS and Plan today (7/30/2020). In review of yesterday's note (7/29/2020), there are no changes except as documented above.     Discussed with patient, RN, pharmacy and urology.    This note was generated using voice recognition software which has a chance of producing errors of grammar and content.  I have made every reasonable attempt to find and correct any errors, but it should be expected that some may not be found prior to finalization of this note.  __________  Julian Hood MD  Pulmonary and Critical Care Medicine  Formerly [facility], [facility]

## 2020-07-30 NOTE — OR NURSING
1735: Rcvd report from JOSLYN Mcbride. Will go to get pt in time for surgery.  1806: Brought pt to PACU for pre-op procedures. Pain is tolerable, no nausea, awake, but drowsy, occassionally nods off, but arouses to voice and spontaneously. Andres cath in place.    1846: Patient allergies and NPO status verified, home medication reconciliation completed and belongings secured. Patient verbalizes understanding of pain scale, expected course of stay and plan of care. Surgical site verified with patient. IV access established. Sequentials placed on legs.

## 2020-07-31 ENCOUNTER — ANESTHESIA EVENT (OUTPATIENT)
Dept: SURGERY | Facility: MEDICAL CENTER | Age: 70
DRG: 854 | End: 2020-07-31
Payer: MEDICARE

## 2020-07-31 ENCOUNTER — ANESTHESIA (OUTPATIENT)
Dept: SURGERY | Facility: MEDICAL CENTER | Age: 70
DRG: 854 | End: 2020-07-31
Payer: MEDICARE

## 2020-07-31 PROBLEM — D69.6 THROMBOCYTOPENIA (HCC): Status: RESOLVED | Noted: 2018-03-10 | Resolved: 2020-07-31

## 2020-07-31 LAB
ALBUMIN SERPL BCP-MCNC: 1.7 G/DL (ref 3.2–4.9)
ALBUMIN/GLOB SERPL: 0.5 G/DL
ALP SERPL-CCNC: 235 U/L (ref 30–99)
ALT SERPL-CCNC: 10 U/L (ref 2–50)
ANION GAP SERPL CALC-SCNC: 7 MMOL/L (ref 7–16)
AST SERPL-CCNC: 22 U/L (ref 12–45)
BACTERIA UR CULT: ABNORMAL
BACTERIA UR CULT: ABNORMAL
BASOPHILS # BLD AUTO: 0.8 % (ref 0–1.8)
BASOPHILS # BLD: 0.07 K/UL (ref 0–0.12)
BILIRUB SERPL-MCNC: 0.3 MG/DL (ref 0.1–1.5)
BUN SERPL-MCNC: 9 MG/DL (ref 8–22)
CALCIUM SERPL-MCNC: 7.6 MG/DL (ref 8.4–10.2)
CHLORIDE SERPL-SCNC: 101 MMOL/L (ref 96–112)
CO2 SERPL-SCNC: 27 MMOL/L (ref 20–33)
CREAT SERPL-MCNC: 0.73 MG/DL (ref 0.5–1.4)
EOSINOPHIL # BLD AUTO: 0.22 K/UL (ref 0–0.51)
EOSINOPHIL NFR BLD: 2.6 % (ref 0–6.9)
ERYTHROCYTE [DISTWIDTH] IN BLOOD BY AUTOMATED COUNT: 56.3 FL (ref 35.9–50)
GLOBULIN SER CALC-MCNC: 3.7 G/DL (ref 1.9–3.5)
GLUCOSE BLD-MCNC: 106 MG/DL (ref 65–99)
GLUCOSE BLD-MCNC: 178 MG/DL (ref 65–99)
GLUCOSE BLD-MCNC: 193 MG/DL (ref 65–99)
GLUCOSE BLD-MCNC: 210 MG/DL (ref 65–99)
GLUCOSE SERPL-MCNC: 194 MG/DL (ref 65–99)
HCT VFR BLD AUTO: 37.7 % (ref 42–52)
HGB BLD-MCNC: 11.5 G/DL (ref 14–18)
IMM GRANULOCYTES # BLD AUTO: 0.15 K/UL (ref 0–0.11)
IMM GRANULOCYTES NFR BLD AUTO: 1.8 % (ref 0–0.9)
LYMPHOCYTES # BLD AUTO: 0.8 K/UL (ref 1–4.8)
LYMPHOCYTES NFR BLD: 9.6 % (ref 22–41)
MCH RBC QN AUTO: 25.8 PG (ref 27–33)
MCHC RBC AUTO-ENTMCNC: 30.5 G/DL (ref 33.7–35.3)
MCV RBC AUTO: 84.5 FL (ref 81.4–97.8)
MONOCYTES # BLD AUTO: 0.47 K/UL (ref 0–0.85)
MONOCYTES NFR BLD AUTO: 5.6 % (ref 0–13.4)
NEUTROPHILS # BLD AUTO: 6.66 K/UL (ref 1.82–7.42)
NEUTROPHILS NFR BLD: 79.6 % (ref 44–72)
NRBC # BLD AUTO: 0 K/UL
NRBC BLD-RTO: 0 /100 WBC
PLATELET # BLD AUTO: 510 K/UL (ref 164–446)
PMV BLD AUTO: 11 FL (ref 9–12.9)
POTASSIUM SERPL-SCNC: 4 MMOL/L (ref 3.6–5.5)
PROT SERPL-MCNC: 5.4 G/DL (ref 6–8.2)
RBC # BLD AUTO: 4.46 M/UL (ref 4.7–6.1)
SIGNIFICANT IND 70042: ABNORMAL
SITE SITE: ABNORMAL
SODIUM SERPL-SCNC: 135 MMOL/L (ref 135–145)
SOURCE SOURCE: ABNORMAL
WBC # BLD AUTO: 8.4 K/UL (ref 4.8–10.8)

## 2020-07-31 PROCEDURE — 160048 HCHG OR STATISTICAL LEVEL 1-5: Performed by: UROLOGY

## 2020-07-31 PROCEDURE — 160035 HCHG PACU - 1ST 60 MINS PHASE I: Performed by: UROLOGY

## 2020-07-31 PROCEDURE — 36415 COLL VENOUS BLD VENIPUNCTURE: CPT

## 2020-07-31 PROCEDURE — 160009 HCHG ANES TIME/MIN: Performed by: UROLOGY

## 2020-07-31 PROCEDURE — 501445 HCHG STAPLER, SKIN DISP: Performed by: UROLOGY

## 2020-07-31 PROCEDURE — 700105 HCHG RX REV CODE 258: Performed by: INTERNAL MEDICINE

## 2020-07-31 PROCEDURE — A6403 STERILE GAUZE>16 <= 48 SQ IN: HCPCS | Performed by: UROLOGY

## 2020-07-31 PROCEDURE — A9270 NON-COVERED ITEM OR SERVICE: HCPCS | Performed by: INTERNAL MEDICINE

## 2020-07-31 PROCEDURE — 0JBB0ZZ EXCISION OF PERINEUM SUBCUTANEOUS TISSUE AND FASCIA, OPEN APPROACH: ICD-10-PCS | Performed by: UROLOGY

## 2020-07-31 PROCEDURE — 160002 HCHG RECOVERY MINUTES (STAT): Performed by: UROLOGY

## 2020-07-31 PROCEDURE — 302098 PASTE RING (FLAT): Performed by: INTERNAL MEDICINE

## 2020-07-31 PROCEDURE — 700101 HCHG RX REV CODE 250: Performed by: ANESTHESIOLOGY

## 2020-07-31 PROCEDURE — 87102 FUNGUS ISOLATION CULTURE: CPT

## 2020-07-31 PROCEDURE — 700102 HCHG RX REV CODE 250 W/ 637 OVERRIDE(OP): Performed by: INTERNAL MEDICINE

## 2020-07-31 PROCEDURE — 700111 HCHG RX REV CODE 636 W/ 250 OVERRIDE (IP): Performed by: ANESTHESIOLOGY

## 2020-07-31 PROCEDURE — 80053 COMPREHEN METABOLIC PANEL: CPT

## 2020-07-31 PROCEDURE — 85025 COMPLETE CBC W/AUTO DIFF WBC: CPT

## 2020-07-31 PROCEDURE — 700111 HCHG RX REV CODE 636 W/ 250 OVERRIDE (IP): Performed by: INTERNAL MEDICINE

## 2020-07-31 PROCEDURE — 87070 CULTURE OTHR SPECIMN AEROBIC: CPT

## 2020-07-31 PROCEDURE — 82962 GLUCOSE BLOOD TEST: CPT

## 2020-07-31 PROCEDURE — 160039 HCHG SURGERY MINUTES - EA ADDL 1 MIN LEVEL 3: Performed by: UROLOGY

## 2020-07-31 PROCEDURE — 87205 SMEAR GRAM STAIN: CPT

## 2020-07-31 PROCEDURE — 700105 HCHG RX REV CODE 258: Performed by: ANESTHESIOLOGY

## 2020-07-31 PROCEDURE — 770006 HCHG ROOM/CARE - MED/SURG/GYN SEMI*

## 2020-07-31 PROCEDURE — 501838 HCHG SUTURE GENERAL: Performed by: UROLOGY

## 2020-07-31 PROCEDURE — 99233 SBSQ HOSP IP/OBS HIGH 50: CPT | Performed by: INTERNAL MEDICINE

## 2020-07-31 PROCEDURE — 500380 HCHG DRAIN, PENROSE 1/4X12: Performed by: UROLOGY

## 2020-07-31 PROCEDURE — 160028 HCHG SURGERY MINUTES - 1ST 30 MINS LEVEL 3: Performed by: UROLOGY

## 2020-07-31 RX ORDER — MIDAZOLAM HYDROCHLORIDE 1 MG/ML
INJECTION INTRAMUSCULAR; INTRAVENOUS PRN
Status: DISCONTINUED | OUTPATIENT
Start: 2020-07-31 | End: 2020-07-31 | Stop reason: SURG

## 2020-07-31 RX ORDER — ONDANSETRON 2 MG/ML
4 INJECTION INTRAMUSCULAR; INTRAVENOUS
Status: DISCONTINUED | OUTPATIENT
Start: 2020-07-31 | End: 2020-07-31 | Stop reason: HOSPADM

## 2020-07-31 RX ORDER — MEPERIDINE HYDROCHLORIDE 25 MG/ML
6.25 INJECTION INTRAMUSCULAR; INTRAVENOUS; SUBCUTANEOUS
Status: DISCONTINUED | OUTPATIENT
Start: 2020-07-31 | End: 2020-07-31 | Stop reason: HOSPADM

## 2020-07-31 RX ORDER — HYDROMORPHONE HYDROCHLORIDE 1 MG/ML
0.2 INJECTION, SOLUTION INTRAMUSCULAR; INTRAVENOUS; SUBCUTANEOUS
Status: DISCONTINUED | OUTPATIENT
Start: 2020-07-31 | End: 2020-07-31 | Stop reason: HOSPADM

## 2020-07-31 RX ORDER — HYDROMORPHONE HYDROCHLORIDE 1 MG/ML
0.4 INJECTION, SOLUTION INTRAMUSCULAR; INTRAVENOUS; SUBCUTANEOUS
Status: DISCONTINUED | OUTPATIENT
Start: 2020-07-31 | End: 2020-07-31 | Stop reason: HOSPADM

## 2020-07-31 RX ORDER — HYDROMORPHONE HYDROCHLORIDE 1 MG/ML
0.1 INJECTION, SOLUTION INTRAMUSCULAR; INTRAVENOUS; SUBCUTANEOUS
Status: DISCONTINUED | OUTPATIENT
Start: 2020-07-31 | End: 2020-07-31 | Stop reason: HOSPADM

## 2020-07-31 RX ORDER — DIPHENHYDRAMINE HYDROCHLORIDE 50 MG/ML
12.5 INJECTION INTRAMUSCULAR; INTRAVENOUS
Status: DISCONTINUED | OUTPATIENT
Start: 2020-07-31 | End: 2020-07-31 | Stop reason: HOSPADM

## 2020-07-31 RX ORDER — PHENYLEPHRINE HCL IN 0.9% NACL 0.5 MG/5ML
SYRINGE (ML) INTRAVENOUS PRN
Status: DISCONTINUED | OUTPATIENT
Start: 2020-07-31 | End: 2020-07-31 | Stop reason: SURG

## 2020-07-31 RX ORDER — SODIUM CHLORIDE, SODIUM LACTATE, POTASSIUM CHLORIDE, CALCIUM CHLORIDE 600; 310; 30; 20 MG/100ML; MG/100ML; MG/100ML; MG/100ML
INJECTION, SOLUTION INTRAVENOUS
Status: DISCONTINUED | OUTPATIENT
Start: 2020-07-31 | End: 2020-07-31 | Stop reason: SURG

## 2020-07-31 RX ORDER — LIDOCAINE HYDROCHLORIDE 20 MG/ML
INJECTION, SOLUTION EPIDURAL; INFILTRATION; INTRACAUDAL; PERINEURAL PRN
Status: DISCONTINUED | OUTPATIENT
Start: 2020-07-31 | End: 2020-07-31 | Stop reason: SURG

## 2020-07-31 RX ORDER — HYDRALAZINE HYDROCHLORIDE 20 MG/ML
5 INJECTION INTRAMUSCULAR; INTRAVENOUS
Status: DISCONTINUED | OUTPATIENT
Start: 2020-07-31 | End: 2020-07-31 | Stop reason: HOSPADM

## 2020-07-31 RX ADMIN — AMPICILLIN SODIUM AND SULBACTAM SODIUM 3 G: 2; 1 INJECTION, POWDER, FOR SOLUTION INTRAMUSCULAR; INTRAVENOUS at 12:25

## 2020-07-31 RX ADMIN — SODIUM CHLORIDE, POTASSIUM CHLORIDE, SODIUM LACTATE AND CALCIUM CHLORIDE: 600; 310; 30; 20 INJECTION, SOLUTION INTRAVENOUS at 16:09

## 2020-07-31 RX ADMIN — SODIUM CHLORIDE, POTASSIUM CHLORIDE, SODIUM LACTATE AND CALCIUM CHLORIDE 1000 ML: 600; 310; 30; 20 INJECTION, SOLUTION INTRAVENOUS at 17:39

## 2020-07-31 RX ADMIN — OXYCODONE HYDROCHLORIDE 15 MG: 5 TABLET ORAL at 18:00

## 2020-07-31 RX ADMIN — MORPHINE SULFATE 2 MG: 4 INJECTION INTRAVENOUS at 05:13

## 2020-07-31 RX ADMIN — AMPICILLIN SODIUM AND SULBACTAM SODIUM 3 G: 2; 1 INJECTION, POWDER, FOR SOLUTION INTRAMUSCULAR; INTRAVENOUS at 00:52

## 2020-07-31 RX ADMIN — PROPOFOL 150 MG: 10 INJECTION, EMULSION INTRAVENOUS at 16:15

## 2020-07-31 RX ADMIN — FENTANYL CITRATE 50 MCG: 50 INJECTION, SOLUTION INTRAMUSCULAR; INTRAVENOUS at 16:38

## 2020-07-31 RX ADMIN — MORPHINE SULFATE 2 MG: 4 INJECTION INTRAVENOUS at 18:53

## 2020-07-31 RX ADMIN — Medication 50 MCG: at 17:05

## 2020-07-31 RX ADMIN — LIDOCAINE HYDROCHLORIDE 80 MG: 20 INJECTION, SOLUTION EPIDURAL; INFILTRATION; INTRACAUDAL; PERINEURAL at 16:15

## 2020-07-31 RX ADMIN — AMPICILLIN SODIUM AND SULBACTAM SODIUM 3 G: 2; 1 INJECTION, POWDER, FOR SOLUTION INTRAMUSCULAR; INTRAVENOUS at 18:57

## 2020-07-31 RX ADMIN — ACETAMINOPHEN 1000 MG: 10 INJECTION, SOLUTION INTRAVENOUS at 05:08

## 2020-07-31 RX ADMIN — MORPHINE SULFATE 2 MG: 4 INJECTION INTRAVENOUS at 12:25

## 2020-07-31 RX ADMIN — ACETAMINOPHEN 1000 MG: 10 INJECTION, SOLUTION INTRAVENOUS at 22:49

## 2020-07-31 RX ADMIN — OXYCODONE HYDROCHLORIDE 15 MG: 5 TABLET ORAL at 09:32

## 2020-07-31 RX ADMIN — OMEPRAZOLE 20 MG: 20 CAPSULE, DELAYED RELEASE ORAL at 05:03

## 2020-07-31 RX ADMIN — KETOROLAC TROMETHAMINE 15 MG: 30 INJECTION, SOLUTION INTRAMUSCULAR at 18:16

## 2020-07-31 RX ADMIN — MORPHINE SULFATE 2 MG: 4 INJECTION INTRAVENOUS at 22:45

## 2020-07-31 RX ADMIN — MORPHINE SULFATE 2 MG: 4 INJECTION INTRAVENOUS at 02:02

## 2020-07-31 RX ADMIN — SENNOSIDES-DOCUSATE SODIUM TAB 8.6-50 MG 2 TABLET: 8.6-5 TAB at 05:03

## 2020-07-31 RX ADMIN — OXYCODONE HYDROCHLORIDE 15 MG: 5 TABLET ORAL at 03:20

## 2020-07-31 RX ADMIN — OXYCODONE HYDROCHLORIDE 15 MG: 5 TABLET ORAL at 23:34

## 2020-07-31 RX ADMIN — FENTANYL CITRATE 100 MCG: 50 INJECTION, SOLUTION INTRAMUSCULAR; INTRAVENOUS at 16:15

## 2020-07-31 RX ADMIN — FENTANYL CITRATE 50 MCG: 50 INJECTION, SOLUTION INTRAMUSCULAR; INTRAVENOUS at 16:55

## 2020-07-31 RX ADMIN — FENTANYL CITRATE 50 MCG: 50 INJECTION, SOLUTION INTRAMUSCULAR; INTRAVENOUS at 18:16

## 2020-07-31 RX ADMIN — AMPICILLIN SODIUM AND SULBACTAM SODIUM 3 G: 2; 1 INJECTION, POWDER, FOR SOLUTION INTRAMUSCULAR; INTRAVENOUS at 05:53

## 2020-07-31 RX ADMIN — ONDANSETRON 4 MG: 4 TABLET, ORALLY DISINTEGRATING ORAL at 09:32

## 2020-07-31 RX ADMIN — MIDAZOLAM HYDROCHLORIDE 2 MG: 1 INJECTION, SOLUTION INTRAMUSCULAR; INTRAVENOUS at 16:09

## 2020-07-31 RX ADMIN — AMPICILLIN SODIUM AND SULBACTAM SODIUM 3 G: 2; 1 INJECTION, POWDER, FOR SOLUTION INTRAMUSCULAR; INTRAVENOUS at 23:13

## 2020-07-31 RX ADMIN — FENTANYL CITRATE 50 MCG: 50 INJECTION, SOLUTION INTRAMUSCULAR; INTRAVENOUS at 18:26

## 2020-07-31 RX ADMIN — THERA TABS 1 TABLET: TAB at 05:03

## 2020-07-31 RX ADMIN — MORPHINE SULFATE 2 MG: 4 INJECTION INTRAVENOUS at 14:51

## 2020-07-31 RX ADMIN — Medication 100 MCG: at 16:28

## 2020-07-31 RX ADMIN — INSULIN LISPRO 3 UNITS: 100 INJECTION, SOLUTION INTRAVENOUS; SUBCUTANEOUS at 23:05

## 2020-07-31 ASSESSMENT — ENCOUNTER SYMPTOMS
ABDOMINAL PAIN: 0
NERVOUS/ANXIOUS: 0
BLURRED VISION: 0
MYALGIAS: 0
INSOMNIA: 0
HEADACHES: 0
SHORTNESS OF BREATH: 0
CLAUDICATION: 0
VOMITING: 0
WEAKNESS: 0
PHOTOPHOBIA: 0
DIARRHEA: 0
HEARTBURN: 0
DIZZINESS: 0
SENSORY CHANGE: 0
CHILLS: 0
DEPRESSION: 0
CONSTIPATION: 0
SPEECH CHANGE: 0
FEVER: 0
COUGH: 0

## 2020-07-31 ASSESSMENT — PAIN SCALES - GENERAL: PAIN_LEVEL: 5

## 2020-07-31 NOTE — PROGRESS NOTES
Received report from night RN. Assumed care. Pt A&O x4, assessment done, pain level 7 on 0-10 scale in groin area. Pt. Medicated for pain. Groin is red. Generalized edema in lower extremities with thready pedal pulses. VSS. POC discussed: Sx at 1600, per MD - keep pt. NPO.

## 2020-07-31 NOTE — CARE PLAN
Problem: Pain Management  Goal: Pain level will decrease to patient's comfort goal  Outcome: PROGRESSING AS EXPECTED  Intervention: Educate and implement non-pharmacologic comfort measures. Examples: relaxation, distration, play therapy, activity therapy, massage, etc.  Flowsheets (Taken 7/30/2020 2000)  Intervention:   Elevation   Medication (see MAR)  Note: Will administer prescribed pain med if initial approach- turning and repositioning ineffective .     Problem: Communication  Goal: The ability to communicate needs accurately and effectively will improve  Outcome: PROGRESSING AS EXPECTED  Intervention: Educate patient and significant other/support system about the plan of care, procedures, treatments, medications and allow for questions  Note: Patient/ family member updated on Plan Of Care and Nurses communications with Doctors.

## 2020-07-31 NOTE — DOCUMENTATION QUERY
[facility]                                                                       Query Response Note      PATIENT:               MARZENA ROMEO  ACCT #:                  [id number]  MRN:                     [id number]  :                      1950  ADMIT DATE:       2020 1:31 PM  DISCH DATE:          RESPONDING  PROVIDER #:        [id number]           QUERY TEXT:    Sepsis is documented in the ED provider note.  Please clarify whether:    NOTE:  If an appropriate response is not listed below, please respond with a new note.    The patient's Clinical Indicators include:  - Findings:  Sepsis per ED provider note and not elsewhere in the medical record.  Admit labs:  WBC 14.4, lactic acid 2.9 .  Admit vitals:  HR 90, T 98.7, RR 13  - Treatments:  IVF, antibiotics, sepsis protocol, excisional debridement   - Risk factors:  Landon gangrene, DM, systemic lupus  Options provided:   -- Agree with ED provider - sepsis present on admission   -- Disagree with ED provider- sepsis not present on admission   -- Unable to determine      Query created by: Maggie Perry on 2020 8:44 AM    RESPONSE TEXT:    Agree with ED provider - sepsis present on admission       QUERY TEXT:    There is conflicting documentation in the medical record.      Type 1 DM is documented in the ED provider note and the consult note    Type 2 DM is documented in the H&P and the subsequent progress notes     Based on treatment, clinical findings and risk factors, can this documentation be further clarified?    The patient's Clinical Indicators include:  - Findings:  There is conflicting documentation in the medical recording regarding type 1 DM vs. type 2 DM   - Treatments:  insulin, repeat labs   - Risk factors:  Landon gangrene, systemic lupus, HTN, long term use of steroids  Options provided:   -- Type 1 DM with gangrene   -- Type 2 DM with gangrene   --  Unable to determine      Query created by: Maggie Perry on 7/27/2020 8:52 AM    RESPONSE TEXT:    Type 2 DM with gangrene          Electronically signed by:  SUGEY MUNROE III, MD 7/30/2020 6:31 PM

## 2020-07-31 NOTE — PROGRESS NOTES
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 male who presented 7/25/2020 with hx of CAD s/p PCI, pAF on eliquis, HTN, SLE on imuran & prednisone, polycytemia on hydroxyurea, recurrent rash on legs and buttocks followed by dermatology, recurrent cysts on his back needing I&D, Type II DM not well controlled insulin dependent (A1c 8.6%), has had a rash on his inner thigh that he is following up with Dermatology  Required insulin drip on admission    7/25: I&D right hemiscrotum  (Dr Power)  7/29: Further debridement (DR Donta Viera)  7/31: Further debridement with wound vac placement (Dr Rosales)      Interval Problem Update  Patient feeling significant pain with any movement or contact to the perineal area.  He states he's concerned that there is still packing in place from the initial surgery.  He is going for washout and wound vac placement today with Dr Rosales.    Review of Systems  Review of Systems   Constitutional: Negative for chills and fever.   HENT: Negative for congestion.    Eyes: Negative for blurred vision and photophobia.   Respiratory: Negative for cough and shortness of breath.    Cardiovascular: Negative for chest pain, claudication and leg swelling.   Gastrointestinal: Negative for abdominal pain, constipation, diarrhea, heartburn and vomiting.   Genitourinary: Negative for dysuria and hematuria.        Significant scrotal pain     Musculoskeletal: Negative for joint pain and myalgias.   Skin: Negative for itching and rash.   Neurological: Negative for dizziness, sensory change, speech change, weakness and headaches.   Psychiatric/Behavioral: Negative for depression. The patient is not nervous/anxious and does not have insomnia.       Vital Signs for last 24 hours   Temp:  [36.6 °C (97.8 °F)-37.1 °C (98.7 °F)] 36.6 °C (97.8 °F)  Pulse:  [64-74] 69  Resp:  [16-18] 18  BP:  (119-143)/(50-67) 143/61  SpO2:  [91 %-96 %] 91 %       Physical Exam   Physical Exam  Vitals signs and nursing note reviewed.   Constitutional:       Appearance: He is not ill-appearing.   HENT:      Head: Normocephalic and atraumatic.      Nose: Nose normal.      Mouth/Throat:      Mouth: Mucous membranes are moist.   Eyes:      Extraocular Movements: Extraocular movements intact.      Pupils: Pupils are equal, round, and reactive to light.   Neck:      Musculoskeletal: Normal range of motion.   Cardiovascular:      Rate and Rhythm: Normal rate.   Pulmonary:      Effort: Pulmonary effort is normal.   Abdominal:      General: Abdomen is flat. Bowel sounds are normal.      Palpations: Abdomen is soft.   Genitourinary:     Comments: Marked decrease in scrotal swelling  Penrose in place  No wound vac  Incision open and packed tender still in the groin area  Andres in place    Musculoskeletal: Normal range of motion.   Skin:     General: Skin is warm.   Neurological:      General: No focal deficit present.      Mental Status: He is alert and oriented to person, place, and time. Mental status is at baseline.   Psychiatric:         Mood and Affect: Mood normal.         Behavior: Behavior normal.         Thought Content: Thought content normal.         Judgment: Judgment normal.       Medications  Current Facility-Administered Medications   Medication Dose Route Frequency Provider Last Rate Last Dose   • insulin glargine (Lantus) injection  22 Units Subcutaneous Q EVENING Julian Hood M.D.        And   • insulin lispro (HumaLOG) injection  4 Units Subcutaneous TID AC Julian Hood M.D.   Stopped at 07/31/20 0700    And   • insulin lispro (HumaLOG) injection  3-14 Units Subcutaneous 4X/DAY ACHS Julian Hood M.D.   Stopped at 07/30/20 2100    And   • glucose 4 g chewable tablet 16 g  16 g Oral Q15 MIN PRN Julian Hood M.D.        And   • dextrose 50% (D50W) injection 50 mL  50 mL Intravenous Q15 MIN PRN  Julian Hood M.D.       • acetaminophen (OFIRMEV) injection 1,000 mg  1,000 mg Intravenous Q8HRS Julian Hood M.D.   1,000 mg at 07/31/20 0508   • oxyCODONE immediate-release (ROXICODONE) tablet 15 mg  15 mg Oral Q4HRS PRN Julian Hood M.D.   15 mg at 07/31/20 0932   • miconazole 2%-zinc oxide (Steven) topical cream   Topical BID Julian Hood M.D.       • morphine (pf) 4 MG/ML injection 2 mg  2 mg Intravenous Q2HRS PRN Julian Hood M.D.   2 mg at 07/31/20 1225   • ampicillin/sulbactam (UNASYN) 3 g in  mL IVPB  3 g Intravenous Q6HRS Julian Hood M.D. 200 mL/hr at 07/31/20 1225 3 g at 07/31/20 1225   • multivitamin (THERAGRAN) tablet 1 Tab  1 Tab Oral DAILY Julian Hood M.D.   Stopped at 07/31/20 0600   • hyoscyamine-maalox plus-lidocaine viscous (GI COCKTAIL) oral susp 15 mL  15 mL Oral Q4HRS PRN Julian Hood M.D.       • ketorolac (TORADOL) injection 15 mg  15 mg Intravenous Q6HRS PRN Julian Hood M.D.   15 mg at 07/30/20 0822   • omeprazole (PRILOSEC) capsule 20 mg  20 mg Oral DAILY Dre Rosenbaum M.D.   20 mg at 07/31/20 0503   • enoxaparin (LOVENOX) inj 40 mg  40 mg Subcutaneous DAILY Dre Rosenbaum M.D.   Stopped at 07/31/20 0600   • lactated ringers infusion (BOLUS): BMI greater than 30  30 mL/kg (Ideal) Intravenous Once PRN Dre Rosenbaum M.D.       • senna-docusate (PERICOLACE or SENOKOT S) 8.6-50 MG per tablet 2 Tab  2 Tab Oral BID Dre Rosenbaum M.D.   2 Tab at 07/31/20 0503    And   • polyethylene glycol/lytes (MIRALAX) PACKET 1 Packet  1 Packet Oral QDAY CLAYTON Rosenbaum M.D.   1 Packet at 07/28/20 2311    And   • magnesium hydroxide (MILK OF MAGNESIA) suspension 30 mL  30 mL Oral QDAY CLAYTON Rosenbaum M.D.   30 mL at 07/29/20 0613    And   • bisacodyl (DULCOLAX) suppository 10 mg  10 mg Rectal QDAY CLAYTON Rosenbaum M.D.       • ondansetron (ZOFRAN) syringe/vial injection 4 mg  4 mg Intravenous Q4HRS  CLAYTON Rosenbaum M.D.   4 mg at 07/29/20 1946   • ondansetron (ZOFRAN ODT) dispertab 4 mg  4 mg Oral Q4HRS PRSENDY Rosenbaum M.D.   4 mg at 07/31/20 0932     Fluids    Intake/Output Summary (Last 24 hours) at 7/31/2020 1358  Last data filed at 7/31/2020 0600  Gross per 24 hour   Intake 360 ml   Output 1800 ml   Net -1440 ml     Laboratory          Recent Labs     07/29/20  0403 07/30/20  0217 07/31/20  0441   SODIUM 136 134* 135   POTASSIUM 3.5* 4.0 4.0   CHLORIDE 103 102 101   CO2 23 26 27   BUN 11 9 9   CREATININE 0.68 0.72 0.73   CALCIUM 7.9* 7.6* 7.6*     Recent Labs     07/29/20  0403 07/30/20  0217 07/31/20  0441   ALTSGPT 8 7 10   ASTSGOT 13 24 22   ALKPHOSPHAT 142* 176* 235*   TBILIRUBIN 0.6 0.4 0.3   GLUCOSE 254* 253* 194*     Recent Labs     07/29/20  0403 07/30/20  0217 07/31/20  0441   WBC 12.0* 12.7* 8.4   NEUTSPOLYS 89.40* 87.30* 79.60*   LYMPHOCYTES 3.80* 5.00* 9.60*   MONOCYTES 3.90 4.00 5.60   EOSINOPHILS 1.10 1.10 2.60   BASOPHILS 0.30 0.50 0.80   ASTSGOT 13 24 22   ALTSGPT 8 7 10   ALKPHOSPHAT 142* 176* 235*   TBILIRUBIN 0.6 0.4 0.3     Recent Labs     07/29/20  0403 07/30/20  0217 07/31/20  0441   RBC 4.47* 4.38* 4.46*   HEMOGLOBIN 11.3* 11.1* 11.5*   HEMATOCRIT 37.5* 36.6* 37.7*   PLATELETCT 394 489* 510*     Imaging  No new imaging    Assessment/Plan  * 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)  Assessment & Plan  unasyn to continue 7/28 -   Pain management with: ofirmev 1g q8H, oxy IR 10 q4H, morphine 2mg IVP q2H and ketorolac PRN  Wound care following, wound vac removed, penrose drain in place  Back to OR today with Dr Rosales for washout and wound vac placement      CAD (coronary artery disease)- (present on admission)  Assessment & Plan  No acute events      Type 2 diabetes mellitus, with long-term current use of insulin (HCC)- (present on admission)  Assessment & Plan  lantus 22 units- whs  SSI      Paroxysmal A-fib (HCC)- (present on admission)  Assessment &  Plan  Hold eliquis secondary to interventions  Rate controlled      Pancreatic cyst  Assessment & Plan  Follow up with imaging as outpatient      SLE (systemic lupus erythematosus related syndrome) (HCC)  Assessment & Plan  Prednisone on hold secondary to infection - watch for issues with adrenal insuffiencey       VTE:  Lovenox  Ulcer: PPI  Lines: None

## 2020-07-31 NOTE — ASSESSMENT & PLAN NOTE
-holding prednisone in the setting of infection  -no s/s adrenal insufficiency  -has not had steroid since 7/31/2020  Stable last esr/crp low.

## 2020-07-31 NOTE — THERAPY
Missed Therapy     Patient Name: Francesco Schulte  Age:  69 y.o., Sex:  male  Medical Record #: [id number]  Today's Date: 7/31/2020    Discussed missed therapy with RN       07/31/20 3648   Interdisciplinary Plan of Care Collaboration   IDT Collaboration with  Nursing   Collaboration Comments PT order received; Pt is currently awaiting schedule surgery for today. Will complete evaluation once pt is post op for appropriate functional assessment.

## 2020-07-31 NOTE — ASSESSMENT & PLAN NOTE
"-Severe involving right testicle, status post multiple washouts by urology on 7/25, 7/29, 7/31, 8/3.  -ongoing pain requiring pre-medication with IV Morphine and Ativan prior to wound VAC dressing changes  -per wound team, wound is rolling in on itself. Patient also reports increased \"shooting\" pain up his right groin. Discussed with Urology today. May possibly need another washout  -Increased gabapentin 8/18  -Continue MS Contin  -Continue Gabapentin, MS contin 15 BID, PRN oxy   -IV morphine PRN  -Completed two weeks of unasyn and fluconazole (8/17/20)  -Limit immunosuppression as much as possible to facilitate healing  -Tight glucose control, goal is less than 150  -wound vac/wound care  -SNF when able to wean off IV narcotics  Will need debridement and plastic surgery eval for possible skin flap     "

## 2020-07-31 NOTE — PROGRESS NOTES
Urology Pre-op Note:  68 yo M with Landon's gangrene of the scrotum - s/p I&D.    Plan - OR today for washout of wound and wound vac placement (pt was unable to tolerate bedside)

## 2020-07-31 NOTE — ASSESSMENT & PLAN NOTE
-he has been refusing Lantus (last dose 8/7). This has been DC'd  -chemsticks 133-194 past 24 hours  -continue Lispro TID and SSI  -continue Diabetic diet  -hypoglycemia protocol  -goal blood sugar <150 for optimal wound healing

## 2020-07-31 NOTE — PROGRESS NOTES
Pt awake at this time, request for incontinent pad change. Pts sharmaine area wet w/. Sero-sanguanous discharge from his wound. Pre medication pain med administered by another RN. Gentle handling implemented during  Dressing change and turning / repositioning. Pt made aware his  Schedule for procedure  Would be around 4 pm today. Pt also verbalized his suprapubic area full and distended. Bladder scanned w/. 400 ml urine. Andres draining well w/. Yellow urine.

## 2020-07-31 NOTE — OR NURSING
1530- pt in pre-op.  VSS. Pt A&Ox4. Pleasant but expresses concern regarding impending procedure.  Pt reassured that Dr. Rosales will take very good care of him.  Dr. Rosales in to see pt, procedure explained. Pt signed consents. Anesthesia in to eval pt. All with good understanding.  Andres bag drained for 2000cc.  IVFs infusing w/o infiltrate.

## 2020-07-31 NOTE — THERAPY
Missed Therapy     Patient Name: Francesco Schulte  Age:  69 y.o., Sex:  male  Medical Record #: [id number]  Today's Date: 7/31/2020    Discussed missed therapy evaluation with RN. Pt will be going into surgery this date- will not be able to tolerate an OT eval. Will see in am if Pt is able.

## 2020-07-31 NOTE — ANESTHESIA PREPROCEDURE EVALUATION
Relevant Problems   NEURO   (+) History of ST elevation myocardial infarction (STEMI)   (+) History of stroke- old right parietal/occipital      CARDIAC   (+) CAD (coronary artery disease)   (+) Carotid stenosis, 90% right carotid   (+) Paroxysmal A-fib (HCC)   (+) STEMI (ST elevation myocardial infarction) (HCC)   (+) Stenosis of right carotid artery-Right CEA 3/21/18      ENDO   (+) Type 2 diabetes mellitus, with long-term current use of insulin (HCC)      Other   (+) Coagulopathy (HCC)   (+) Dizziness   (+) Landon's gangrene of scrotum   (+) Pancreatic cyst   (+) Polycythemia vera (HCC)   (+) SLE (systemic lupus erythematosus related syndrome) ([facility])   (+) Thrombocytopenia (HCC) (Resolved)   (+) Thrombocytosis (HCC)   (+) Vertigo       Physical Exam    Airway   Mallampati: II  TM distance: >3 FB  Neck ROM: full       Cardiovascular - normal exam  Rhythm: regular  Rate: normal  (-) murmur     Dental - normal exam           Pulmonary - normal exam  Breath sounds clear to auscultation     Abdominal    Neurological - normal exam                 Anesthesia Plan    ASA 3   ASA physical status 3 criteria: CAD/stents (> 3 months) and MI or angina - history (> 3 months)    Plan - general       Airway plan will be LMA      Plan Factors:   Patient was not previously instructed to abstain from smoking on day of procedure.  Patient did not smoke on day of procedure.      Induction: intravenous    Postoperative Plan: Postoperative administration of opioids is intended.    Pertinent diagnostic labs and testing reviewed    Informed Consent:    Anesthetic plan and risks discussed with patient.    Use of blood products discussed with: patient whom consented to blood products.

## 2020-07-31 NOTE — ASSESSMENT & PLAN NOTE
-Rate controlled without medication  -anticoagulated with Eliquis  Hold eliquis for possible intervention.

## 2020-08-01 LAB
ANION GAP SERPL CALC-SCNC: 6 MMOL/L (ref 7–16)
BACTERIA WND AEROBE CULT: ABNORMAL
BUN SERPL-MCNC: 8 MG/DL (ref 8–22)
CALCIUM SERPL-MCNC: 7.7 MG/DL (ref 8.4–10.2)
CHLORIDE SERPL-SCNC: 102 MMOL/L (ref 96–112)
CO2 SERPL-SCNC: 28 MMOL/L (ref 20–33)
CREAT SERPL-MCNC: 0.81 MG/DL (ref 0.5–1.4)
ERYTHROCYTE [DISTWIDTH] IN BLOOD BY AUTOMATED COUNT: 56.4 FL (ref 35.9–50)
GLUCOSE BLD-MCNC: 145 MG/DL (ref 65–99)
GLUCOSE BLD-MCNC: 149 MG/DL (ref 65–99)
GLUCOSE BLD-MCNC: 175 MG/DL (ref 65–99)
GLUCOSE BLD-MCNC: 243 MG/DL (ref 65–99)
GLUCOSE SERPL-MCNC: 191 MG/DL (ref 65–99)
GRAM STN SPEC: ABNORMAL
GRAM STN SPEC: NORMAL
HCT VFR BLD AUTO: 39 % (ref 42–52)
HGB BLD-MCNC: 11.6 G/DL (ref 14–18)
MCH RBC QN AUTO: 25.1 PG (ref 27–33)
MCHC RBC AUTO-ENTMCNC: 29.7 G/DL (ref 33.7–35.3)
MCV RBC AUTO: 84.4 FL (ref 81.4–97.8)
PLATELET # BLD AUTO: 591 K/UL (ref 164–446)
PMV BLD AUTO: 10.9 FL (ref 9–12.9)
POTASSIUM SERPL-SCNC: 4.2 MMOL/L (ref 3.6–5.5)
RBC # BLD AUTO: 4.62 M/UL (ref 4.7–6.1)
SIGNIFICANT IND 70042: ABNORMAL
SIGNIFICANT IND 70042: NORMAL
SITE SITE: ABNORMAL
SITE SITE: NORMAL
SODIUM SERPL-SCNC: 136 MMOL/L (ref 135–145)
SOURCE SOURCE: ABNORMAL
SOURCE SOURCE: NORMAL
WBC # BLD AUTO: 9.5 K/UL (ref 4.8–10.8)

## 2020-08-01 PROCEDURE — 36415 COLL VENOUS BLD VENIPUNCTURE: CPT

## 2020-08-01 PROCEDURE — A9270 NON-COVERED ITEM OR SERVICE: HCPCS | Performed by: INTERNAL MEDICINE

## 2020-08-01 PROCEDURE — 700111 HCHG RX REV CODE 636 W/ 250 OVERRIDE (IP): Performed by: INTERNAL MEDICINE

## 2020-08-01 PROCEDURE — 700102 HCHG RX REV CODE 250 W/ 637 OVERRIDE(OP): Performed by: INTERNAL MEDICINE

## 2020-08-01 PROCEDURE — 99232 SBSQ HOSP IP/OBS MODERATE 35: CPT | Performed by: INTERNAL MEDICINE

## 2020-08-01 PROCEDURE — 82962 GLUCOSE BLOOD TEST: CPT

## 2020-08-01 PROCEDURE — 97161 PT EVAL LOW COMPLEX 20 MIN: CPT

## 2020-08-01 PROCEDURE — 700105 HCHG RX REV CODE 258: Performed by: INTERNAL MEDICINE

## 2020-08-01 PROCEDURE — 80048 BASIC METABOLIC PNL TOTAL CA: CPT

## 2020-08-01 PROCEDURE — 770006 HCHG ROOM/CARE - MED/SURG/GYN SEMI*

## 2020-08-01 PROCEDURE — 85027 COMPLETE CBC AUTOMATED: CPT

## 2020-08-01 RX ORDER — MORPHINE SULFATE 4 MG/ML
2-4 INJECTION, SOLUTION INTRAMUSCULAR; INTRAVENOUS
Status: DISCONTINUED | OUTPATIENT
Start: 2020-08-01 | End: 2020-08-13

## 2020-08-01 RX ADMIN — ONDANSETRON 4 MG: 2 INJECTION INTRAMUSCULAR; INTRAVENOUS at 18:00

## 2020-08-01 RX ADMIN — INSULIN LISPRO 4 UNITS: 100 INJECTION, SOLUTION INTRAVENOUS; SUBCUTANEOUS at 16:30

## 2020-08-01 RX ADMIN — INSULIN LISPRO 4 UNITS: 100 INJECTION, SOLUTION INTRAVENOUS; SUBCUTANEOUS at 16:31

## 2020-08-01 RX ADMIN — MORPHINE SULFATE 3 MG: 4 INJECTION INTRAVENOUS at 13:39

## 2020-08-01 RX ADMIN — SENNOSIDES-DOCUSATE SODIUM TAB 8.6-50 MG 2 TABLET: 8.6-5 TAB at 17:21

## 2020-08-01 RX ADMIN — MORPHINE SULFATE 4 MG: 4 INJECTION INTRAVENOUS at 15:43

## 2020-08-01 RX ADMIN — MORPHINE SULFATE 4 MG: 4 INJECTION INTRAVENOUS at 22:55

## 2020-08-01 RX ADMIN — ACETAMINOPHEN 1000 MG: 10 INJECTION, SOLUTION INTRAVENOUS at 06:00

## 2020-08-01 RX ADMIN — AMPICILLIN SODIUM AND SULBACTAM SODIUM 3 G: 2; 1 INJECTION, POWDER, FOR SOLUTION INTRAMUSCULAR; INTRAVENOUS at 17:21

## 2020-08-01 RX ADMIN — AMPICILLIN SODIUM AND SULBACTAM SODIUM 3 G: 2; 1 INJECTION, POWDER, FOR SOLUTION INTRAMUSCULAR; INTRAVENOUS at 05:17

## 2020-08-01 RX ADMIN — MORPHINE SULFATE 4 MG: 4 INJECTION INTRAVENOUS at 19:52

## 2020-08-01 RX ADMIN — OXYCODONE HYDROCHLORIDE 15 MG: 5 TABLET ORAL at 05:48

## 2020-08-01 RX ADMIN — AMPICILLIN SODIUM AND SULBACTAM SODIUM 3 G: 2; 1 INJECTION, POWDER, FOR SOLUTION INTRAMUSCULAR; INTRAVENOUS at 11:22

## 2020-08-01 RX ADMIN — ENOXAPARIN SODIUM 40 MG: 40 INJECTION SUBCUTANEOUS at 05:09

## 2020-08-01 RX ADMIN — KETOROLAC TROMETHAMINE 15 MG: 30 INJECTION, SOLUTION INTRAMUSCULAR at 17:45

## 2020-08-01 RX ADMIN — MORPHINE SULFATE 2 MG: 4 INJECTION INTRAVENOUS at 05:00

## 2020-08-01 RX ADMIN — OXYCODONE HYDROCHLORIDE 15 MG: 5 TABLET ORAL at 09:59

## 2020-08-01 RX ADMIN — SENNOSIDES-DOCUSATE SODIUM TAB 8.6-50 MG 2 TABLET: 8.6-5 TAB at 05:09

## 2020-08-01 RX ADMIN — THERA TABS 1 TABLET: TAB at 05:09

## 2020-08-01 RX ADMIN — MORPHINE SULFATE 2 MG: 4 INJECTION INTRAVENOUS at 08:53

## 2020-08-01 RX ADMIN — OMEPRAZOLE 20 MG: 20 CAPSULE, DELAYED RELEASE ORAL at 05:09

## 2020-08-01 RX ADMIN — INSULIN GLARGINE 22 UNITS: 100 INJECTION, SOLUTION SUBCUTANEOUS at 16:32

## 2020-08-01 RX ADMIN — MORPHINE SULFATE 2 MG: 4 INJECTION INTRAVENOUS at 11:22

## 2020-08-01 ASSESSMENT — ENCOUNTER SYMPTOMS
BLURRED VISION: 0
WEAKNESS: 0
FEVER: 0
CHILLS: 0
DIARRHEA: 0
DEPRESSION: 0
HEADACHES: 0
VOMITING: 0
CLAUDICATION: 0
DIZZINESS: 0
HEARTBURN: 0
SENSORY CHANGE: 0
INSOMNIA: 0
CONSTIPATION: 0
MYALGIAS: 0
COUGH: 0
PHOTOPHOBIA: 0
SPEECH CHANGE: 0
ABDOMINAL PAIN: 0
SHORTNESS OF BREATH: 0
NERVOUS/ANXIOUS: 0

## 2020-08-01 NOTE — THERAPY
Missed Therapy     Patient Name: Francesco Schulte  Age:  69 y.o., Sex:  male  Medical Record #: [id number]  Today's Date: 8/1/2020    Discussed missed therapy eval with NSG, Pt refused OT eval this date- too much pain. Will eval as Pt is able.

## 2020-08-01 NOTE — OP REPORT
DATE OF SERVICE:  07/31/2020    PREOPERATIVE DIAGNOSES:  Landon's gangrene of the right scrotum, perineum,   and right inguinal canal.    POSTOPERATIVE DIAGNOSES:  Landon's gangrene of the right scrotum, perineum,   and right inguinal canal.    PROCEDURE PERFORMED:  Excisional debridement and washout of Landon's   gangrene of the right scrotum, perineum, and right inguinal canal and wound   VAC placement.    SURGEON:  Ferny Rosales MD.    ANESTHESIOLOGIST:  Julia Schroeder MD.    ANESTHESIA:  General.    INDICATIONS FOR PROCEDURE:  The patient is a 69-year-old gentleman who was   admitted earlier this week with right scrotal abscesses and Landon's   gangrene, has had 2 different debridements and was not able to tolerate wound   VAC placement bedside, so now presents for further debridement, washout and   wound VAC placement.    DESCRIPTION OF PROCEDURE:  After obtaining informed consent, the patient was   brought into the operating room.  After the induction of general anesthesia,   he was placed in dorsal lithotomy position and his genitalia were prepped and   draped in sterile fashion and his Andres catheter was left in place.  As I   examined the wound, there was a wound that tracked up the right inguinal canal   approximately 5 cm with some overlying erythema, but no evidence of necrotic   fat or pus from that area.  There was some pus in the scrotum and a few areas   of necrosis in the right lateral scrotum along the midline raphae of the   scrotum and the perineum, there was a separate wound just inferior to the main   wound and then one just in the posterior perineum.  I decided to open up all   of these into one open wound for ease of placement of the VAC.  The wound   edges were cleaned and excisionally debrided down through dermis and   subcutaneous tissue down to the areas of perineal muscle.  The wound edges   were then cauterized to obtain hemostasis.  There was some purulent drainage    and pus that was there that was swabbed and sent for culture.  The wound VAC   team was present to help me place the wound VAC and place some Adaptic around   the testicle and then filled the empty space with wound VAC sponge, lined the   wound with ostomy paste and benzoin and then applied the wound VAC and hooked   it up to suction.  There was good seal at the end of the procedure.  The   patient was then awoken from anesthesia and taken to recovery room in stable   condition.    COMPLICATIONS:  None.    ESTIMATED BLOOD LOSS:  50 mL.    SPECIMENS:  Wound culture.    DRAINS:  None.       ____________________________________     MD TATO Lujan / DEANNA    DD:  07/31/2020 17:42:59  DT:  07/31/2020 20:13:14    D#:  [id number]  Job#:  [id number]

## 2020-08-01 NOTE — ANESTHESIA QCDR
2019 Qualified Clinical Data Registry (for Quality Improvement)     Postoperative nausea/vomiting risk protocol (Adult = 18 yrs and Pediatric 3-17 yrs)- (430 and 463)  General inhalation anesthetic (NOT TIVA) with PONV risk factors:   Provision of anti-emetic therapy with at least 2 different classes of agents:   Patient DID NOT receive anti-emetic therapy and reason is documented in Medical Record:     Multimodal Pain Management- (477)  Non-emergent surgery AND patient age >= 18: Yes  Use of Multimodal Pain Management, two or more drugs and/or interventions, NOT including systemic opioids: Yes  Exception: Documented allergy to multiple classes of analgesics: N/A    Smoking Abstinence (404)  Patient is current smoker (cigarette, pipe, e-cig, marijuanna): No  Elective Surgery:   Abstinence instructions provided prior to day of surgery:   Patient abstained from smoking on day of surgery:     Pre-Op Beta-Blocker in Isolated CABG (44)  Isolated CABG AND patient age >= 18: No  Beta-blocker admin within 24 hours of surgical incision:   Exception:of medical reason(s) for not administering beta blocker within 24 hours prior to surgical incision (e.g., not  indicated,other medical reason):     PACU assessment of acute postoperative pain prior to Anesthesia Care End- Applies to Patients Age = 18- (ABG7)  Initial PACU pain score is which of the following: < 7/10  Patient unable to report pain score: N/A    Post-anesthetic transfer of care checklist/protocol to PACU/ICU- (426 and 427)  Upon conclusion of case, patient transferred to which of the following locations: PACU/Non-ICU  Use of transfer checklist/protocol: Yes  Exclusion: Service Performed in Patient Hospital Room (and thus did not require transfer): N/A  Unplanned admission to ICU related to anesthesia service up through end of PACU care- (MD51)  Unplanned admission to ICU (not initially anticipated at anesthesia start time): No

## 2020-08-01 NOTE — PROGRESS NOTES
Received report from NOC shift nurse. Assumed pt care at 0715. Pt is A&Ox4, resting comfortably in bed. Pt on r.a. No signs of SOB/respiratory distress. Labs noted, VSS. Pt c/o pain 6/10 in R scrotal area at this moment. Will return for morning meds and assessment. Fall precautions in place. Bed at lowest position. Call light and personal belongings within reach. Continue to monitor

## 2020-08-01 NOTE — WOUND TEAM
[facility]  Inpatient Services  Initial Wound and Skin Care Evaluation    Admission Date: 7/25/2020     Last order of IP CONSULT TO WOUND CARE was found on 7/30/2020 from Hospital Encounter on 7/25/2020       HPI, PMH, SH: Reviewed    Unit where seen by Wound Team: 2215/01     WOUND CONSULT/FOLLOW UP RELATED TO:  OR vac placement to scrotal wound    Self Report / Pain Level:  Nonverbal, sedated    OBJECTIVE:  Plan for next dressing in OR Monday.    WOUND TYPE, LOCATION, CHARACTERISTICS (Pressure Injuries: location, stage, POA or date identified)    Negative Pressure Wound Therapy 07/27/20 Surgical (Active)   NPWT Pump Mode / Pressure Setting Ulta;Intermittent;125 mmHg    Dressing Type Medium;Black Foam (Veraflo)    Number of Foam Pieces Used 3    Canister Changed No    Output (mL) 10 mL    NEXT Dressing Change/Treatment Date 08/03/20    VAC VeraFlo Irrigant Normal Saline    VAC VeraFlo Soak Time (mins) 4    VAC VeraFlo Instill Volume (ml) 26    VAC VeraFlo - Therapy Time (hrs) 2.5    VAC VeraFlo Pressure (mm/Hg) 125 mmHg            Wound 07/25/20 Full Thickness Wound Scrotum Right penrose drain & sutures (Active)   Site Assessment Red;Pink;Drainage    Periwound Assessment Intact    Margins Defined edges    Closure Secondary intention    Drainage Amount Small    Drainage Description Serosanguineous;Purulent    Treatments Site care    Periwound Protectant Benzoin;Paste Ring    Dressing Cleansing/Solutions Not Applicable    Dressing Options Wound Vac    Dressing Changed New    Dressing Status Intact    Dressing Change/Treatment Frequency Monday, Wednesday, Friday, and As Needed    NEXT Dressing Change/Treatment Date 08/03/20    NEXT Weekly Photo (Inpatient Only) 08/07/20    Non-staged Wound Description Full thickness    Wound Length (cm) ~30 cm    Wound Width (cm) ~12    Wound Depth (cm) ~7    Wound Surface Area (cm^2) 14 cm^2    Wound Volume (cm^3) 56 cm^3    Wound Healing % -26    Tunneling Clock  Position of Wound ~10 cm @1100    Tunneling Clock Position of Wound - 2nd Location ~6cm at 070    Shape irregular    Wound Odor None    Exposed Structures Connective tissue;Other (Comments)      Vascular:    JW:   No results found.      Lab Values:    Lab Results   Component Value Date/Time    WBC 9.5 08/01/2020 04:34 AM    RBC 4.62 (L) 08/01/2020 04:34 AM    HEMOGLOBIN 11.6 (L) 08/01/2020 04:34 AM    HEMATOCRIT 39.0 (L) 08/01/2020 04:34 AM    CREACTPROT 0.58 07/01/2020 07:03 AM    SEDRATEWES 15 07/01/2020 07:03 AM    HBA1C 8.6 (A) 01/07/2020 07:14 AM          Culture:      Culture Results show:  Recent Results (from the past 720 hour(s))   CULTURE WOUND W/ GRAM STAIN    Collection Time: 07/25/20  8:30 PM    Specimen: Wound   Result Value Ref Range    Significant Indicator POS (POS)     Source WND     Site Scrotal Abscess     Culture Result - (A)     Gram Stain Result       Many Gram positive cocci.  Moderate Gram positive rods.      Culture Result Viridans Streptococcus  Heavy growth   (A)     Culture Result Diphtheroids  Moderate growth   (A)        Susceptibility    Viridans streptococcus - E-TEST     Penicillin 0.094 Sensitive mcg/mL     Cefotaxime 0.38 Sensitive mcg/mL         INTERVENTIONS BY WOUND TEAM:  See OP Report for further details. See flowsheet for vac settings.    Exposed testicle wrapped in 2 layers of adaptic. 2.5 paste ring to periwound. 3 pc of vera deep foam to wound bed including tunneling. Drape and trac pad to foam. Seal achieved.    Interdisciplinary consultation: Patient, Keisha Amezcua    EVALUATION: Pt not tolerating bedside changes d/t pain (see wound team note 07/30). OR planned next change on Monday. Urology to contact wound team ext [phone] if wound RN needed for placement, otherwise, wound team will continue to follow as needed. Will benefit from continued veraflo therapy to encourage growth of granular tissue while protecting exposed structures.    Goals: Steady decrease in wound  area and depth weekly.    NURSING PLAN OF CARE ORDERS (X):    Dressing changes: See Dressing Care orders: x  Skin care: See Skin Care orders:   Rectal tube care: See Rectal Tube Care orders:   Other orders:    RSKIN:   CURRENTLY IN PLACE (X), APPLIED THIS VISIT (A), ORDERED (O):   Q shift Neal:  x  Q shift pressure point assessments:  x  Pressure redistribution mattress       x     Low Airloss          Bariatric MASSIMO         Bariatric foam           Heel float boots     Heel Silicone dressing        Float Heels off Bed with Pillows               Barrier wipes         Barrier Cream         Barrier paste          Sacral silicone dressing         Silicone O2 tubing         Anchorfast         Cannula fixation Device (Tender )          Gray Foam Ear protectors           Trach with Optifoam split foam                 Waffle cushion        Waffle Overlay         Rectal tube or BMS    Purwick/Condom Cath          Antifungal tx      Interdry          Reposition q 2 hours      x  Up to chair        Ambulate      PT/OT        Dietician        Diabetes Education      PO     TF     TPN     xNPO   # days   Other        WOUND TEAM PLAN OF CARE   Dressing changes by wound team:          Follow up 1-2 times weekly:               Follow up 3 times weekly:                NPWT change 3 times weekly:     Follow up as needed:   x    Other (explain):     Anticipated discharge plans: TBD- will need ongoing NPWT  LTACH:        SNF/Rehab:                  Home Care:           Outpatient Wound Center:            Self Care:

## 2020-08-01 NOTE — PROGRESS NOTES
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 male who presented 7/25/2020 with hx of CAD s/p PCI, pAF on eliquis, HTN, SLE on imuran & prednisone, polycytemia on hydroxyurea, recurrent rash on legs and buttocks followed by dermatology, recurrent cysts on his back needing I&D, Type II DM not well controlled insulin dependent (A1c 8.6%), has had a rash on his inner thigh that he is following up with Dermatology  Required insulin drip on admission    7/25: I&D right hemiscrotum  (Dr Power)  7/29: Further debridement (DR Donta Viera)  7/31: Further debridement with wound vac placement (Dr Rosales)      Interval Problem Update  7/31 Patient feeling significant pain with any movement or contact to the perineal area.  He states he's concerned that there is still packing in place from the initial surgery.  He is going for washout and wound vac placement today with Dr Rosales.  8/1 Patient feeling okay other than severe pain in the scrotum from placement of wound vac and washout yesterday.  He remains afebrile.  ID to consult today for recommendation for antibiotics and duration of treatment given the multiple pathogens of diphtheroids, strep virdans and candida on cultures.      Review of Systems  Review of Systems   Constitutional: Negative for chills and fever.   HENT: Negative for congestion.    Eyes: Negative for blurred vision and photophobia.   Respiratory: Negative for cough and shortness of breath.    Cardiovascular: Negative for chest pain, claudication and leg swelling.   Gastrointestinal: Negative for abdominal pain, constipation, diarrhea, heartburn and vomiting.   Genitourinary: Negative for dysuria and hematuria.        Significant scrotal pain     Musculoskeletal: Negative for joint pain and myalgias.   Skin: Negative for itching and rash.   Neurological: Negative for dizziness, sensory  change, speech change, weakness and headaches.   Psychiatric/Behavioral: Negative for depression. The patient is not nervous/anxious and does not have insomnia.       Vital Signs for last 24 hours   Temp:  [36 °C (96.8 °F)-37 °C (98.6 °F)] 36.7 °C (98 °F)  Pulse:  [67-86] 77  Resp:  [16-18] 18  BP: (103-170)/(54-94) 138/66  SpO2:  [90 %-100 %] 99 %       Physical Exam   Physical Exam  Vitals signs and nursing note reviewed.   Constitutional:       Appearance: He is not ill-appearing.   HENT:      Head: Normocephalic and atraumatic.      Nose: Nose normal.      Mouth/Throat:      Mouth: Mucous membranes are moist.   Eyes:      Extraocular Movements: Extraocular movements intact.      Pupils: Pupils are equal, round, and reactive to light.   Neck:      Musculoskeletal: Normal range of motion.   Cardiovascular:      Rate and Rhythm: Normal rate.   Pulmonary:      Effort: Pulmonary effort is normal.   Abdominal:      General: Abdomen is flat. Bowel sounds are normal.      Palpations: Abdomen is soft.   Genitourinary:     Comments: Wound vac sponge in place in scrotum.  Andres in place    Musculoskeletal: Normal range of motion.   Skin:     General: Skin is warm.   Neurological:      General: No focal deficit present.      Mental Status: He is alert and oriented to person, place, and time. Mental status is at baseline.   Psychiatric:         Mood and Affect: Mood normal.         Behavior: Behavior normal.         Thought Content: Thought content normal.         Judgment: Judgment normal.       Medications  Current Facility-Administered Medications   Medication Dose Route Frequency Provider Last Rate Last Dose   • insulin glargine (Lantus) injection  22 Units Subcutaneous Q EVENING Julian Hood M.D.        And   • insulin lispro (HumaLOG) injection  4 Units Subcutaneous TID  Julian Hood M.D.   Stopped at 07/31/20 0700    And   • insulin lispro (HumaLOG) injection  3-14 Units Subcutaneous 4X/DAY MYKE RAHMAN  JANES Hood   Stopped at 08/01/20 0700    And   • glucose 4 g chewable tablet 16 g  16 g Oral Q15 MIN PRN Julian Hood M.D.        And   • dextrose 50% (D50W) injection 50 mL  50 mL Intravenous Q15 MIN PRN Julian Hood M.D.       • oxyCODONE immediate-release (ROXICODONE) tablet 15 mg  15 mg Oral Q4HRS PRN Julian Hood M.D.   15 mg at 08/01/20 0959   • miconazole 2%-zinc oxide (Steven) topical cream   Topical BID Julian Hood M.D.       • morphine (pf) 4 MG/ML injection 2 mg  2 mg Intravenous Q2HRS PRN Julian Hood M.D.   2 mg at 08/01/20 1122   • ampicillin/sulbactam (UNASYN) 3 g in  mL IVPB  3 g Intravenous Q6HRS Julian Hood M.D. 200 mL/hr at 08/01/20 1122 3 g at 08/01/20 1122   • multivitamin (THERAGRAN) tablet 1 Tab  1 Tab Oral DAILY Julian Hood M.D.   1 Tab at 08/01/20 0509   • hyoscyamine-maalox plus-lidocaine viscous (GI COCKTAIL) oral susp 15 mL  15 mL Oral Q4HRS PRN Julian Hood M.D.       • ketorolac (TORADOL) injection 15 mg  15 mg Intravenous Q6HRS PRN Julian Hood M.D.   15 mg at 07/31/20 1816   • omeprazole (PRILOSEC) capsule 20 mg  20 mg Oral DAILY Dre Rosenbaum M.D.   20 mg at 08/01/20 0509   • enoxaparin (LOVENOX) inj 40 mg  40 mg Subcutaneous DAILY Dre Rosenbaum M.D.   40 mg at 08/01/20 0509   • senna-docusate (PERICOLACE or SENOKOT S) 8.6-50 MG per tablet 2 Tab  2 Tab Oral BID Dre Rosenbaum M.D.   2 Tab at 08/01/20 0509    And   • polyethylene glycol/lytes (MIRALAX) PACKET 1 Packet  1 Packet Oral QDAY PRN Dre Wuhani-Myranda, M.D.   1 Packet at 07/28/20 2311    And   • magnesium hydroxide (MILK OF MAGNESIA) suspension 30 mL  30 mL Oral QDAY CLAYTON Rosenbaum M.D.   30 mL at 07/29/20 0613    And   • bisacodyl (DULCOLAX) suppository 10 mg  10 mg Rectal QDAY PRSENDY Rosenbaum M.D.       • ondansetron (ZOFRAN) syringe/vial injection 4 mg  4 mg Intravenous Q4HRS CLAYTON Rosenbaum M.D.   4 mg at 07/29/20  1946   • ondansetron (ZOFRAN ODT) dispertab 4 mg  4 mg Oral Q4HRS PRN Dre Rosenbaum M.D.   4 mg at 07/31/20 0932     Fluids    Intake/Output Summary (Last 24 hours) at 8/1/2020 1126  Last data filed at 8/1/2020 0800  Gross per 24 hour   Intake 1800 ml   Output 2610 ml   Net -810 ml     Laboratory          Recent Labs     07/30/20 0217 07/31/20 0441 08/01/20  0434   SODIUM 134* 135 136   POTASSIUM 4.0 4.0 4.2   CHLORIDE 102 101 102   CO2 26 27 28   BUN 9 9 8   CREATININE 0.72 0.73 0.81   CALCIUM 7.6* 7.6* 7.7*     Recent Labs     07/30/20 0217 07/31/20 0441 08/01/20  0434   ALTSGPT 7 10  --    ASTSGOT 24 22  --    ALKPHOSPHAT 176* 235*  --    TBILIRUBIN 0.4 0.3  --    GLUCOSE 253* 194* 191*     Recent Labs     07/30/20 0217 07/31/20 0441 08/01/20  0434   WBC 12.7* 8.4 9.5   NEUTSPOLYS 87.30* 79.60*  --    LYMPHOCYTES 5.00* 9.60*  --    MONOCYTES 4.00 5.60  --    EOSINOPHILS 1.10 2.60  --    BASOPHILS 0.50 0.80  --    ASTSGOT 24 22  --    ALTSGPT 7 10  --    ALKPHOSPHAT 176* 235*  --    TBILIRUBIN 0.4 0.3  --      Recent Labs     07/30/20 0217 07/31/20 0441 08/01/20  0434   RBC 4.38* 4.46* 4.62*   HEMOGLOBIN 11.1* 11.5* 11.6*   HEMATOCRIT 36.6* 37.7* 39.0*   PLATELETCT 489* 510* 591*     Imaging  No new imaging    Assessment/Plan  * 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  Pain management with: oxy IR 10 q4H, morphine 2mg IVP q2H and ketorolac PRN  Wound care following, wound vac placed again 7/31        CAD (coronary artery disease)- (present on admission)  Assessment & Plan  No acute events      Type 2 diabetes mellitus, with long-term current use of insulin (HCC)- (present on admission)  Assessment & Plan  lantus 22 units- whs  SSI      Paroxysmal A-fib (HCC)- (present on admission)  Assessment & Plan  Hold eliquis secondary to interventions  Rate  controlled      Pancreatic cyst  Assessment & Plan  Follow up with imaging as outpatient      SLE (systemic lupus erythematosus related syndrome) (HCC)  Assessment & Plan  Prednisone on hold secondary to infection - watch for issues with adrenal insuffiencey       VTE:  Lovenox  Ulcer: PPI  Lines: None

## 2020-08-01 NOTE — THERAPY
Missed Therapy     Patient Name: Francesco Schulte  Age:  69 y.o., Sex:  male  Medical Record #: [id number]  Today's Date: 8/1/2020    Discussed missed therapy eval with RN. Pt unable to tolerate mobility eval due to pain.

## 2020-08-01 NOTE — OR SURGEON
Immediate Post OP Note    PreOp Diagnosis: Landon's gangrene of the scrotum and perineum    PostOp Diagnosis: same    Procedure(s):  EXCISIONAL DEBRIDEMENT OF SCROTAL, PERINEAL, AND RIGHT INGUINAL LANDON'S GANGRENE; WOUND VAC PLACEMENT    Surgeon(s):  Ferny Rosales M.D.    Anesthesiologist/Type of Anesthesia:  Anesthesiologist: Julia Schroeder M.D./General    Surgical Staff:  Circulator: Saeed Cagle R.N.  Scrub Person: Naveed Webber    Specimens removed if any:  ID Type Source Tests Collected by Time Destination   1 : Scrotal abcess/gangrene Body Fluid Scrotum AFB CULTURE, FUNGAL CULTURE, AEROBIC/ANAEROBIC CULTURE (SURGERY) Ferny Rosales M.D. 7/31/2020  4:58 PM        Estimated Blood Loss: 50ml    Findings: necrotic tissue and pus throughout the right hemiscrotum and perineum and tracking up the right inguinal canal    Complications: none        7/31/2020 5:33 PM Ferny Rosales M.D.

## 2020-08-01 NOTE — ANESTHESIA POSTPROCEDURE EVALUATION
Patient: Francesco Schulte    Procedure Summary     Date:  07/31/20 Room / Location:   OR  / SURGERY [facility]    Anesthesia Start:  1609 Anesthesia Stop:  1734    Procedure:  EXPLORATION, SCROTUM - FOR WASH OUT OF SCROTAL WOUND & WOUND VAC PLACEMENT (Right Scrotum) Diagnosis:  (gangrene of the scrotum )    Surgeon:  Ferny Rosales M.D. Responsible Provider:  Julia Schroeder M.D.    Anesthesia Type:  general ASA Status:  3          Final Anesthesia Type: general  Last vitals  BP   Blood Pressure : (Abnormal) 162/90    Temp   36.4 °C (97.5 °F)    Pulse   Pulse: 83   Resp   18    SpO2   93 %      Anesthesia Post Evaluation    Patient location during evaluation: PACU  Patient participation: complete - patient participated  Level of consciousness: awake and alert  Pain score: 5    Airway patency: patent  Anesthetic complications: no  Cardiovascular status: hemodynamically stable  Respiratory status: acceptable  Hydration status: euvolemic    PONV: none           Nurse Pain Score: 5 (NPRS)

## 2020-08-01 NOTE — CARE PLAN
Problem: Communication  Goal: The ability to communicate needs accurately and effectively will improve  Outcome: PROGRESSING AS EXPECTED  Note: Encouraged pt to verbalized  Concerns.Interventions discussed and available as needed.      Problem: Safety  Goal: Will remain free from falls  Outcome: PROGRESSING AS EXPECTED  Intervention: Assess risk factors for falls  Flowsheets (Taken 8/1/2020 0019)  History of fall: 0  Risk for Injury-Any positive answers results in the pt being at high risk for fall related injury: Not Applicable  Note: Call light use for needs encouraged.     Problem: Venous Thromboembolism (VTW)/Deep Vein Thrombosis (DVT) Prevention:  Goal: Patient will participate in Venous Thrombosis (VTE)/Deep Vein Thrombosis (DVT)Prevention Measures  Outcome: PROGRESSING AS EXPECTED  Intervention: Encourage patient to perform ankle flex, foot rotation, and knee flex exercises in addition to other prophylatic measures every hour while awake  Note: SCD on.

## 2020-08-01 NOTE — CONSULTS
"ID consult from Dr Jass Cruz gangrene  Briefly 68 y/o diabetic male with lupus admitted 7/25/2020 due to scrotal pain from above. Started as a \"boil\"-ruptured 3 days PTA. CT showed gas in the tissues  Initially in the ICU  S/p multiple surgeries:  7/25: I&D right hemiscrotum    7/29: I and D scrotum +necrosis and pus  7/31: Excisional debridement and washout of Landon's gangrene of the right scrotum, perineum, and right inguinal canal and wound VAC placement.  Blood cxs neg  Operative cultures positive: strep viridans, diptheroids, prevotella   Received mult different abx, incl vanco, clinda, Zosyn-now on Unasyn  Agree with Unasyn. Endpoint clinical  If unable to dc Andres add fluconazole for candiduria. Monitor Qtc. 473 at last check  Full consult to follow        "

## 2020-08-01 NOTE — ANESTHESIA TIME REPORT
Anesthesia Start and Stop Event Times     Date Time Event    7/31/2020 04:03 PM Ready for Procedure    7/31/2020 04:09 PM Anesthesia Start    7/31/2020 05:34 PM Anesthesia Stop        Responsible Staff  07/31/20    Name Role Begin End    Julia Schroeder M.D. Anesthesiologist 07/31/20 04:09 PM 07/31/20 05:34 PM        Preop Diagnosis (Free Text):  Pre-op Diagnosis     gangrene of the scrotum         Preop Diagnosis (Codes):    Post op Diagnosis  Landon's gangrene of scrotum      Premium Reason  A. 3PM - 7AM    Comments: Scrotal exploration; I&D; Application of wound vac.

## 2020-08-01 NOTE — PROGRESS NOTES
Spoke with pt's RN, Chikis regarding pt's need for VAT.Agreed plan is to consult with ID today for order for PICC or midline for long term abx. Pt has working IV access at this time.

## 2020-08-02 ENCOUNTER — APPOINTMENT (OUTPATIENT)
Dept: RADIOLOGY | Facility: MEDICAL CENTER | Age: 70
DRG: 854 | End: 2020-08-02
Attending: INTERNAL MEDICINE
Payer: MEDICARE

## 2020-08-02 LAB
ANION GAP SERPL CALC-SCNC: 5 MMOL/L (ref 7–16)
BACTERIA BLD CULT: NORMAL
BACTERIA BLD CULT: NORMAL
BASOPHILS # BLD AUTO: 1 % (ref 0–1.8)
BASOPHILS # BLD: 0.09 K/UL (ref 0–0.12)
BUN SERPL-MCNC: 6 MG/DL (ref 8–22)
CALCIUM SERPL-MCNC: 8 MG/DL (ref 8.4–10.2)
CHLORIDE SERPL-SCNC: 103 MMOL/L (ref 96–112)
CO2 SERPL-SCNC: 31 MMOL/L (ref 20–33)
CREAT SERPL-MCNC: 0.84 MG/DL (ref 0.5–1.4)
EOSINOPHIL # BLD AUTO: 0.18 K/UL (ref 0–0.51)
EOSINOPHIL NFR BLD: 2 % (ref 0–6.9)
ERYTHROCYTE [DISTWIDTH] IN BLOOD BY AUTOMATED COUNT: 56.9 FL (ref 35.9–50)
GLUCOSE BLD-MCNC: 112 MG/DL (ref 65–99)
GLUCOSE BLD-MCNC: 141 MG/DL (ref 65–99)
GLUCOSE BLD-MCNC: 153 MG/DL (ref 65–99)
GLUCOSE BLD-MCNC: 98 MG/DL (ref 65–99)
GLUCOSE SERPL-MCNC: 101 MG/DL (ref 65–99)
HCT VFR BLD AUTO: 40.8 % (ref 42–52)
HGB BLD-MCNC: 12 G/DL (ref 14–18)
IMM GRANULOCYTES # BLD AUTO: 0.19 K/UL (ref 0–0.11)
IMM GRANULOCYTES NFR BLD AUTO: 2.1 % (ref 0–0.9)
LYMPHOCYTES # BLD AUTO: 0.91 K/UL (ref 1–4.8)
LYMPHOCYTES NFR BLD: 9.9 % (ref 22–41)
MCH RBC QN AUTO: 24.8 PG (ref 27–33)
MCHC RBC AUTO-ENTMCNC: 29.4 G/DL (ref 33.7–35.3)
MCV RBC AUTO: 84.5 FL (ref 81.4–97.8)
MONOCYTES # BLD AUTO: 0.47 K/UL (ref 0–0.85)
MONOCYTES NFR BLD AUTO: 5.1 % (ref 0–13.4)
NEUTROPHILS # BLD AUTO: 7.39 K/UL (ref 1.82–7.42)
NEUTROPHILS NFR BLD: 79.9 % (ref 44–72)
NRBC # BLD AUTO: 0 K/UL
NRBC BLD-RTO: 0 /100 WBC
PLATELET # BLD AUTO: 710 K/UL (ref 164–446)
PMV BLD AUTO: 10.8 FL (ref 9–12.9)
POTASSIUM SERPL-SCNC: 3.9 MMOL/L (ref 3.6–5.5)
RBC # BLD AUTO: 4.83 M/UL (ref 4.7–6.1)
SIGNIFICANT IND 70042: NORMAL
SIGNIFICANT IND 70042: NORMAL
SITE SITE: NORMAL
SITE SITE: NORMAL
SODIUM SERPL-SCNC: 139 MMOL/L (ref 135–145)
SOURCE SOURCE: NORMAL
SOURCE SOURCE: NORMAL
WBC # BLD AUTO: 9.2 K/UL (ref 4.8–10.8)

## 2020-08-02 PROCEDURE — 700105 HCHG RX REV CODE 258: Performed by: INTERNAL MEDICINE

## 2020-08-02 PROCEDURE — 99232 SBSQ HOSP IP/OBS MODERATE 35: CPT | Performed by: INTERNAL MEDICINE

## 2020-08-02 PROCEDURE — 85025 COMPLETE CBC W/AUTO DIFF WBC: CPT

## 2020-08-02 PROCEDURE — 770006 HCHG ROOM/CARE - MED/SURG/GYN SEMI*

## 2020-08-02 PROCEDURE — 700111 HCHG RX REV CODE 636 W/ 250 OVERRIDE (IP): Performed by: INTERNAL MEDICINE

## 2020-08-02 PROCEDURE — A9270 NON-COVERED ITEM OR SERVICE: HCPCS | Performed by: INTERNAL MEDICINE

## 2020-08-02 PROCEDURE — 700102 HCHG RX REV CODE 250 W/ 637 OVERRIDE(OP): Performed by: INTERNAL MEDICINE

## 2020-08-02 PROCEDURE — 05HY33Z INSERTION OF INFUSION DEVICE INTO UPPER VEIN, PERCUTANEOUS APPROACH: ICD-10-PCS | Performed by: INTERNAL MEDICINE

## 2020-08-02 PROCEDURE — 36415 COLL VENOUS BLD VENIPUNCTURE: CPT

## 2020-08-02 PROCEDURE — 82962 GLUCOSE BLOOD TEST: CPT | Mod: 91

## 2020-08-02 PROCEDURE — 99223 1ST HOSP IP/OBS HIGH 75: CPT | Performed by: INTERNAL MEDICINE

## 2020-08-02 PROCEDURE — 80048 BASIC METABOLIC PNL TOTAL CA: CPT

## 2020-08-02 PROCEDURE — 76937 US GUIDE VASCULAR ACCESS: CPT

## 2020-08-02 RX ORDER — FLUCONAZOLE 200 MG/1
200 TABLET ORAL DAILY
Status: DISCONTINUED | OUTPATIENT
Start: 2020-08-02 | End: 2020-08-03

## 2020-08-02 RX ADMIN — ENOXAPARIN SODIUM 40 MG: 40 INJECTION SUBCUTANEOUS at 05:41

## 2020-08-02 RX ADMIN — OXYCODONE HYDROCHLORIDE 15 MG: 5 TABLET ORAL at 05:48

## 2020-08-02 RX ADMIN — OXYCODONE HYDROCHLORIDE 15 MG: 5 TABLET ORAL at 11:15

## 2020-08-02 RX ADMIN — AMPICILLIN SODIUM AND SULBACTAM SODIUM 3 G: 2; 1 INJECTION, POWDER, FOR SOLUTION INTRAMUSCULAR; INTRAVENOUS at 12:05

## 2020-08-02 RX ADMIN — AMPICILLIN SODIUM AND SULBACTAM SODIUM 3 G: 2; 1 INJECTION, POWDER, FOR SOLUTION INTRAMUSCULAR; INTRAVENOUS at 00:03

## 2020-08-02 RX ADMIN — OXYCODONE HYDROCHLORIDE 15 MG: 5 TABLET ORAL at 19:31

## 2020-08-02 RX ADMIN — MICONAZOLE NITRATE: 20 CREAM TOPICAL at 09:43

## 2020-08-02 RX ADMIN — OXYCODONE HYDROCHLORIDE 15 MG: 5 TABLET ORAL at 00:02

## 2020-08-02 RX ADMIN — SENNOSIDES-DOCUSATE SODIUM TAB 8.6-50 MG 2 TABLET: 8.6-5 TAB at 05:40

## 2020-08-02 RX ADMIN — AMPICILLIN SODIUM AND SULBACTAM SODIUM 3 G: 2; 1 INJECTION, POWDER, FOR SOLUTION INTRAMUSCULAR; INTRAVENOUS at 05:40

## 2020-08-02 RX ADMIN — MORPHINE SULFATE 4 MG: 4 INJECTION INTRAVENOUS at 18:37

## 2020-08-02 RX ADMIN — THERA TABS 1 TABLET: TAB at 05:40

## 2020-08-02 RX ADMIN — AMPICILLIN SODIUM AND SULBACTAM SODIUM 3 G: 2; 1 INJECTION, POWDER, FOR SOLUTION INTRAMUSCULAR; INTRAVENOUS at 17:59

## 2020-08-02 RX ADMIN — OMEPRAZOLE 20 MG: 20 CAPSULE, DELAYED RELEASE ORAL at 05:40

## 2020-08-02 RX ADMIN — BENZOCAINE AND MENTHOL 1 LOZENGE: 15; 3.6 LOZENGE ORAL at 13:51

## 2020-08-02 RX ADMIN — INSULIN GLARGINE 22 UNITS: 100 INJECTION, SOLUTION SUBCUTANEOUS at 17:55

## 2020-08-02 RX ADMIN — FLUCONAZOLE 200 MG: 200 TABLET ORAL at 09:30

## 2020-08-02 RX ADMIN — MORPHINE SULFATE 4 MG: 4 INJECTION INTRAVENOUS at 03:10

## 2020-08-02 RX ADMIN — MORPHINE SULFATE 4 MG: 4 INJECTION INTRAVENOUS at 09:28

## 2020-08-02 RX ADMIN — OXYCODONE HYDROCHLORIDE 15 MG: 5 TABLET ORAL at 15:17

## 2020-08-02 RX ADMIN — MORPHINE SULFATE 4 MG: 4 INJECTION INTRAVENOUS at 20:46

## 2020-08-02 RX ADMIN — MORPHINE SULFATE 4 MG: 4 INJECTION INTRAVENOUS at 13:51

## 2020-08-02 ASSESSMENT — ENCOUNTER SYMPTOMS
INSOMNIA: 0
SPEECH CHANGE: 0
FEVER: 0
COUGH: 0
SENSORY CHANGE: 0
DIARRHEA: 0
HEADACHES: 0
BLURRED VISION: 0
CLAUDICATION: 0
NERVOUS/ANXIOUS: 0
SHORTNESS OF BREATH: 0
WEAKNESS: 0
CHILLS: 0
CONSTIPATION: 0
VOMITING: 0
DEPRESSION: 0
PHOTOPHOBIA: 0
DIZZINESS: 0
HEARTBURN: 0
MYALGIAS: 0
ABDOMINAL PAIN: 0

## 2020-08-02 NOTE — CARE PLAN
Problem: Pain Management  Goal: Pain level will decrease to patient's comfort goal  Outcome: PROGRESSING AS EXPECTED     Problem: Infection  Goal: Will remain free from infection  Outcome: PROGRESSING AS EXPECTED     Problem: Knowledge Deficit  Goal: Knowledge of disease process/condition, treatment plan, diagnostic tests, and medications will improve  Outcome: PROGRESSING AS EXPECTED  Goal: Knowledge of the prescribed therapeutic regimen will improve  Outcome: PROGRESSING AS EXPECTED   Pain regimen effective, redness to wounds is improving.  Is aware of plan for OR tomorrow and PICC line for antibiotics.

## 2020-08-02 NOTE — PROGRESS NOTES
"Report received from SCOTT Flores RN. Pt awake, alert, appropriate and pleasant. Pt reports continued pain, denies need for intervention at this time. VSS. /81   Pulse 78   Temp 37.2 °C (99 °F) (Oral)   Resp 16   Ht 1.88 m (6' 2.02\")   Wt 90.8 kg (200 lb 2.8 oz)   SpO2 96%   BMI 25.69 kg/m²  Wound vac in place, maintaining suction. Call light in reach. Will continue to monitor.   "

## 2020-08-02 NOTE — PROGRESS NOTES
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 male who presented 7/25/2020 with hx of CAD s/p PCI, pAF on eliquis, HTN, SLE on imuran & prednisone, polycytemia on hydroxyurea, recurrent rash on legs and buttocks followed by dermatology, recurrent cysts on his back needing I&D, Type II DM not well controlled insulin dependent (A1c 8.6%), has had a rash on his inner thigh that he is following up with Dermatology  Required insulin drip on admission    7/25: I&D right hemiscrotum  (Dr Power)  7/29: Further debridement (DR Donta Viera)  7/31: Further debridement with wound vac placement (Dr Rosales)      Interval Problem Update  7/31 Patient feeling significant pain with any movement or contact to the perineal area.  He states he's concerned that there is still packing in place from the initial surgery.  He is going for washout and wound vac placement today with Dr Rosales.  8/1 Patient feeling okay other than severe pain in the scrotum from placement of wound vac and washout yesterday.  He remains afebrile.  ID to consult today for recommendation for antibiotics and duration of treatment given the multiple pathogens of diphtheroids, strep virdans and candida on cultures.    8/2 Patient doing okay with the increase of pain medication he received yesterday.  Urology planning another trip to the OR again tomorrow for washout.  ID consultation appreciated and midline recommended for course of treatment.      Review of Systems  Review of Systems   Constitutional: Negative for chills and fever.   HENT: Negative for congestion.    Eyes: Negative for blurred vision and photophobia.   Respiratory: Negative for cough and shortness of breath.    Cardiovascular: Negative for chest pain, claudication and leg swelling.   Gastrointestinal: Negative for abdominal pain, constipation, diarrhea, heartburn and  vomiting.   Genitourinary: Negative for dysuria and hematuria.        Significant scrotal pain     Musculoskeletal: Negative for joint pain and myalgias.   Skin: Negative for itching and rash.   Neurological: Negative for dizziness, sensory change, speech change, weakness and headaches.   Psychiatric/Behavioral: Negative for depression. The patient is not nervous/anxious and does not have insomnia.       Vital Signs for last 24 hours   Temp:  [36.6 °C (97.8 °F)-37.3 °C (99.2 °F)] 36.6 °C (97.8 °F)  Pulse:  [69-79] 69  Resp:  [16-20] 18  BP: (117-157)/(66-81) 140/66  SpO2:  [90 %-100 %] 90 %       Physical Exam   Physical Exam  Vitals signs and nursing note reviewed.   Constitutional:       Appearance: He is not ill-appearing.   HENT:      Head: Normocephalic and atraumatic.      Nose: Nose normal.      Mouth/Throat:      Mouth: Mucous membranes are moist.   Eyes:      Extraocular Movements: Extraocular movements intact.      Pupils: Pupils are equal, round, and reactive to light.   Neck:      Musculoskeletal: Normal range of motion.   Cardiovascular:      Rate and Rhythm: Normal rate.   Pulmonary:      Effort: Pulmonary effort is normal.   Abdominal:      General: Abdomen is flat. Bowel sounds are normal.      Palpations: Abdomen is soft.   Genitourinary:     Comments: Wound vac sponge in place in scrotum.  Nadres in place    Musculoskeletal: Normal range of motion.   Skin:     General: Skin is warm.   Neurological:      General: No focal deficit present.      Mental Status: He is alert and oriented to person, place, and time. Mental status is at baseline.   Psychiatric:         Mood and Affect: Mood normal.         Behavior: Behavior normal.         Thought Content: Thought content normal.         Judgment: Judgment normal.       Medications  Current Facility-Administered Medications   Medication Dose Route Frequency Provider Last Rate Last Dose   • fluconazole (DIFLUCAN) tablet 200 mg  200 mg Oral DAILY Anitra JADE  Jass D.O.   200 mg at 08/02/20 0930   • ampicillin/sulbactam (UNASYN) 3 g in  mL IVPB  3 g Intravenous Q6HRS Pema Asif M.D.   Stopped at 08/02/20 1235   • Maalox Plus - Diphenhydramine - Lidocaine (MBX Oral Solution)  5 mL Oral Q6HRS PRN SATINDER Mac.O.       • benzocaine-menthol (CEPACOL) lozenge 1 Lozenge  1 Lozenge Mouth/Throat Q2HRS PRN Anitra Regan D.O.       • morphine (pf) 4 MG/ML injection 2-4 mg  2-4 mg Intravenous Q2HRS PRN REMY MacO.   4 mg at 08/02/20 0928   • insulin glargine (Lantus) injection  22 Units Subcutaneous Q EVENING Julian Hood M.D.   22 Units at 08/01/20 1632    And   • insulin lispro (HumaLOG) injection  4 Units Subcutaneous TID AC Julian Hood M.D.   4 Units at 08/01/20 1631    And   • insulin lispro (HumaLOG) injection  3-14 Units Subcutaneous 4X/DAY ACHS Julian Hood M.D.   Stopped at 08/01/20 2100    And   • glucose 4 g chewable tablet 16 g  16 g Oral Q15 MIN PRN Julian Hood M.D.        And   • dextrose 50% (D50W) injection 50 mL  50 mL Intravenous Q15 MIN PRN Julian Hood M.D.       • oxyCODONE immediate-release (ROXICODONE) tablet 15 mg  15 mg Oral Q4HRS PRN Julian Hood M.D.   15 mg at 08/02/20 1115   • miconazole 2%-zinc oxide (Steven) topical cream   Topical BID Julian Hood M.D.       • multivitamin (THERAGRAN) tablet 1 Tab  1 Tab Oral DAILY Julian Hood M.D.   1 Tab at 08/02/20 0540   • hyoscyamine-maalox plus-lidocaine viscous (GI COCKTAIL) oral susp 15 mL  15 mL Oral Q4HRS PRN Julian Hood M.D.       • omeprazole (PRILOSEC) capsule 20 mg  20 mg Oral DAILY Dre Rosenbaum M.D.   20 mg at 08/02/20 0540   • enoxaparin (LOVENOX) inj 40 mg  40 mg Subcutaneous DAILY Dre Rosenbaum M.D.   40 mg at 08/02/20 0541   • senna-docusate (PERICOLACE or SENOKOT S) 8.6-50 MG per tablet 2 Tab  2 Tab Oral BID Dre Rosenbaum M.D.   2 Tab at 08/02/20 0540    And   • polyethylene glycol/lytes (MIRALAX)  PACKET 1 Packet  1 Packet Oral QDAY CLAYTON Rosenbaum M.D.   1 Packet at 07/28/20 2311    And   • magnesium hydroxide (MILK OF MAGNESIA) suspension 30 mL  30 mL Oral QDAY CLAYTON Rosenbaum M.D.   30 mL at 07/29/20 0613    And   • bisacodyl (DULCOLAX) suppository 10 mg  10 mg Rectal QDAY CLAYTON Rosenbaum M.D.       • ondansetron (ZOFRAN) syringe/vial injection 4 mg  4 mg Intravenous Q4HRS CLAYTON Rosenbaum M.D.   4 mg at 08/01/20 1800   • ondansetron (ZOFRAN ODT) dispertab 4 mg  4 mg Oral Q4HRS CLAYTON Rosenbaum M.D.   4 mg at 07/31/20 0932     Fluids    Intake/Output Summary (Last 24 hours) at 8/2/2020 1345  Last data filed at 8/2/2020 1247  Gross per 24 hour   Intake 3240 ml   Output 6490 ml   Net -3250 ml     Laboratory          Recent Labs     07/31/20 0441 08/01/20 0434 08/02/20  0359   SODIUM 135 136 139   POTASSIUM 4.0 4.2 3.9   CHLORIDE 101 102 103   CO2 27 28 31   BUN 9 8 6*   CREATININE 0.73 0.81 0.84   CALCIUM 7.6* 7.7* 8.0*     Recent Labs     07/31/20 0441 08/01/20 0434 08/02/20 0359   ALTSGPT 10  --   --    ASTSGOT 22  --   --    ALKPHOSPHAT 235*  --   --    TBILIRUBIN 0.3  --   --    GLUCOSE 194* 191* 101*     Recent Labs     07/31/20 0441 08/01/20 0434 08/02/20 0359   WBC 8.4 9.5 9.2   NEUTSPOLYS 79.60*  --  79.90*   LYMPHOCYTES 9.60*  --  9.90*   MONOCYTES 5.60  --  5.10   EOSINOPHILS 2.60  --  2.00   BASOPHILS 0.80  --  1.00   ASTSGOT 22  --   --    ALTSGPT 10  --   --    ALKPHOSPHAT 235*  --   --    TBILIRUBIN 0.3  --   --      Recent Labs     07/31/20  0441 08/01/20  0434 08/02/20  0359   RBC 4.46* 4.62* 4.83   HEMOGLOBIN 11.5* 11.6* 12.0*   HEMATOCRIT 37.7* 39.0* 40.8*   PLATELETCT 510* 591* 710*     Imaging  No new imaging    Assessment/Plan  * 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 10-14 day course after last  surgery  OR again tomorrow per urology    Pain management with: oxy IR 10 q4H, morphine 2-4 mg IVP q2H and ketorolac PRN  Wound care following, wound vac placed again 7/31        CAD (coronary artery disease)- (present on admission)  Assessment & Plan  No acute events      Type 2 diabetes mellitus, with long-term current use of insulin ([facility])- (present on admission)  Assessment & Plan  lantus 22 units- whs  SSI      Paroxysmal A-fib ([facility])- (present on admission)  Assessment & Plan  Hold eliquis secondary to interventions  Rate controlled      Pancreatic cyst  Assessment & Plan  Follow up with imaging as outpatient      SLE (systemic lupus erythematosus related syndrome) ([facility])  Assessment & Plan  Prednisone on hold secondary to infection - watch for issues with adrenal insuffiencey       VTE:  Lovenox  Ulcer: PPI  Lines: None

## 2020-08-02 NOTE — CARE PLAN
Problem: Pain Management  Goal: Pain level will decrease to patient's comfort goal  Outcome: PROGRESSING AS EXPECTED  Note: Pain managed with current PRN options     Problem: Fluid Volume:  Goal: Will maintain balanced intake and output  Outcome: PROGRESSING AS EXPECTED     Problem: Communication  Goal: The ability to communicate needs accurately and effectively will improve  Outcome: PROGRESSING AS EXPECTED  Note: Pt calls appropriately, makes needs known     Problem: Safety  Goal: Will remain free from injury  Outcome: PROGRESSING AS EXPECTED  Goal: Will remain free from falls  Outcome: PROGRESSING AS EXPECTED     Problem: Infection  Goal: Will remain free from infection  Outcome: PROGRESSING AS EXPECTED  Note: Afebrile, continued IV abx     Problem: Venous Thromboembolism (VTW)/Deep Vein Thrombosis (DVT) Prevention:  Goal: Patient will participate in Venous Thrombosis (VTE)/Deep Vein Thrombosis (DVT)Prevention Measures  Outcome: PROGRESSING AS EXPECTED     Problem: Bowel/Gastric:  Goal: Normal bowel function is maintained or improved  Outcome: PROGRESSING AS EXPECTED  Goal: Will not experience complications related to bowel motility  Outcome: PROGRESSING AS EXPECTED     Problem: Knowledge Deficit  Goal: Knowledge of disease process/condition, treatment plan, diagnostic tests, and medications will improve  Outcome: PROGRESSING AS EXPECTED  Goal: Knowledge of the prescribed therapeutic regimen will improve  Outcome: PROGRESSING AS EXPECTED     Problem: Discharge Barriers/Planning  Goal: Patient's continuum of care needs will be met  Outcome: PROGRESSING AS EXPECTED     Problem: Urinary Elimination:  Goal: Ability to reestablish a normal urinary elimination pattern will improve  Outcome: PROGRESSING AS EXPECTED     Problem: Skin Integrity  Goal: Risk for impaired skin integrity will decrease  Outcome: PROGRESSING AS EXPECTED  Note: Pt now tolerating turns, turning self with prompting, wound vac therapy continued       Problem: Respiratory:  Goal: Respiratory status will improve  Outcome: PROGRESSING AS EXPECTED     Problem: Mobility  Goal: Risk for activity intolerance will decrease  Outcome: PROGRESSING SLOWER THAN EXPECTED  Note: Refusing mobilization

## 2020-08-02 NOTE — PROCEDURES
Vascular Access Team    Date of Insertion: 08/02/2020  Arm Circumference: 32cm  Internal length: 12  External Length: 0  Vein Occupancy %: 2  Reason for Midline: antibiotics 14 days  Labs: WBC 9.2, , BUN 6, Cr 0.84, GFR >60,     Orders confirmed, vessel patency confirmed with ultrasound. Risks and benefits of procedure explained to patient and education regarding line associated bloodstream infections provided. Questions answered.     Power Midline placed in LUE per licensed provider order with ultrasound guidance. 4 Fr, SINGLE lumen Power Midline placed in basilic vein after one attempt(s). 4 mL of 1% lidocaine injected intradermally, 21 gauge microintroducer needle and modified Seldinger technique used. 12 cm catheter inserted with good blood return. Secured at 0 cm marker. Each lumen flushed without resistance with 10 mL 0.9% normal saline. Midline secured with Biopatch and Tegaderm.     Midline placement is confirmed by nurse using ultrasound and ability to flush and draw blood. Midline is appropriate for use at this time.  Patient tolerated procedure well, without complications.  No X-ray is needed for placement confirmation.  Patient condition relayed to unit RN or ordering physician via this post procedure note in the EMR.     Ultrasound images uploaded to PACS and viewable in the EMR - yes  Ultrasound imaged printed and placed in paper chart - no     BARD Power Midline ref # [id number], Lot # [id number], Expiration Date 05-

## 2020-08-02 NOTE — CONSULTS
DATE OF SERVICE:  08/02/2020    INFECTIOUS DISEASE CONSULTATION    REASON FOR CONSULT:  Landon's gangrene.    CONSULTING PHYSICIAN:  Anitra Regan DO    HISTORY OF PRESENT ILLNESS:  This is a 69-year-old male with lupus on Imuran   and prednisone and diabetes, who was originally admitted to the hospital on   07/25/2020 due to increasing pain and swelling in his scrotum.  He has been   having problems with recurrent rash on his legs and buttocks. He has been followed   by dermatology for the rash.  He is concerned that he has chronic fungal skin   infections.  In addition to that, he has problems with recurrent cysts, usually on his back, which have been   I and D'd.  He had noticed another cyst on his scrotum about 3 weeks prior   to admission that it drained spontaneously.  Initially, it felt better, but 3   days prior to admission, he started developing increasing swelling and pain   in his right hemiscrotum, so he presented to the hospital.  He was seen and   evaluated by urology and was noted to have a markedly swollen right   hemiscrotum.  CT scan of the abdomen and pelvis was done, which showed diffuse   scrotal edema, extensive soft tissue gas consistent with Landon's gangrene.    He was taken emergently to the OR and underwent incision, drainage and   debridement of the right scrotum on the same hospital day.  He was able to   dissect a fair amount of purulent fluid from the incision.  He required   additional surgery on 07/29 with debridement of necrotic tissue and drainage   of additional purulence.  Multiple pockets were found on his 07/29 surgery.    He underwent an additional surgery on 07/31, which found some pus in the   scrotum and a few areas of necrosis on the right lateral scrotum, none in the   inguinal canal.  Wound VAC remained in place and he is planned for additional   surgery tomorrow due to continued findings of necrotic tissue and pus   throughout the right hemiscrotum and perineum.   He is currently on Unasyn.  He   is requiring a Andres catheter.  Wound VAC is in place.  Infectious disease is   consulted for antibiotic recommendations and management.    REVIEW OF SYSTEMS:  Negative for fever, chills, nausea, vomiting or diarrhea.    He denies any rash associated with his antibiotics.  He does have pain at his   surgical site.  All other systems are reviewed and are negative.    ALLERGIES:  HE HAS MULTIPLE ALLERGIES.  HE DOES GET SWELLING AND DIFFICULTY   SWALLOWING WITH DOXYCYCLINE.  HE IS INTOLERANT TO STATINS, METFORMIN AND BETA   BLOCKERS.    PAST MEDICAL HISTORY:  Diabetes, coronary artery disease, hyperlipidemia,   ischemic cardiomyopathy, nephrolithiasis, polycythemia vera, lupus, stroke,   vertigo and exposure to Agent Orange.    PAST SURGICAL HISTORY:  He has had cardiac cath, laminotomy, appendectomy,   carotid endarterectomy and temporal artery biopsy.    FAMILY HISTORY:  Patient states no one else in his family has similar medical   problems.  No one with rheumatologic disease, diabetes, or heart disease.    SOCIAL HISTORY:  Nonsmoker.  He does not drink or use illicit drugs.    PHYSICAL EXAMINATION:  GENERAL:  He is a well-nourished, well-developed male in no acute distress,   pleasant, cooperative.  VITAL SIGNS:  Temperature on admission was 101.5.  He has been afebrile since   07/29.  Current temperature is 97.8, blood pressure 140/66, pulse 69,   respiratory rate 18, oxygen saturation % on room air.  He weighs 90   kilos.  HEENT:  Normocephalic, atraumatic.  Pupils equal, round, reactive to light.    Extraocular movements are intact.  He has anicteric sclerae.  He has no   conjunctivitis.  NECK:  Supple.  There is no JVD or stridor.  CARDIOVASCULAR:  Irregular rate and rhythm   CHEST:  Grossly clear to auscultation bilaterally and unlabored.  There is no   wheezing or rhonchi.  ABDOMEN:  Soft, nontender, nondistended.  There is no rebound or guarding.  He   does have a Andres  catheter in place.  He has a wound VAC to his right   hemiscrotum.  There is no surrounding edema or necrosis.  EXTREMITIES:  Show no cyanosis, clubbing, or edema.  NEUROLOGIC:  He is awake, alert, oriented.  Speech is fluent without   dysarthria.  Cranial nerves are intact.  He is moving all extremities.  He is   answering questions appropriately.  PSYCHIATRIC:  Mood is normal.  Affect is normal.  Behavior is normal.    CURRENT LABORATORY DATA:  His initial white blood cell count was elevated at   14.4, now 9.2; H and H of 12 and 40.8; platelets of 710.  Sodium 133,   potassium 3.9, chloride 103, bicarbonate 31, glucose 101, BUN 6, creatinine   0.84, AST 22, ALT 10, alkaline phosphatase 235.  Lactic acid was 1.2.    Prealbumin was low at 4.5.  Urinalysis showed no evidence of infection.  There   were no white cells, negative leukocyte esterase, negative nitrite.  He has   markedly abnormal rheumatologic parameters.  He has a speckled ADAM with   positive SSA-52, positive Ro and La antibodies, negative ANCA, positive   anti-double stranded DNA.  COVID was negative.  Blood cultures x2 were   negative.  Urine culture showed some mixed skin jeanine and group B strep.  On   admission, the scrotal cultures showed Prevotella disiens, Viridans strep,   diphtheroids and most recent Candida yeast rare growth and Andres catheter   showing Candida albicans.    IMAGING:  CT scan of the abdomen and pelvis done on admission showed some   atelectasis in the lung bases, small pericardial effusion, normal liver, mild   enlargement of the spleen, adrenal glands are normal.  Kidney cysts were   present.  He had a nonobstructing kidney stone on the left, cystic lesion in   the pancreatic tail.  Gallbladder was normal.  Extensive inflammatory changes   in the scrotum, particularly affecting the right with a large amount of soft   tissue gas in the right hemiscrotum extending to the perineum.  His last   echocardiogram showed an ejection  fraction of 30%.  His EKG showed QTC of 473.    ASSESSMENT AND PLAN:  A 69-year-old diabetic male with lupus, polycythemia   vera, ischemic cardiomyopathy, atrial fibrillation on chronic anticoagulation,   admitted with Landon's gangrene.  He was initially afebrile, developed some   fever postoperatively, which has since resolved.  His initial leukocytosis   has resolved.  He is requiring multiple debridements.  He has already   undergone debridements on 07/25, 07/29, 07/31, is planned again for additional   debridement likely tomorrow.  He is currently on Unasyn, which we agree with   as this treats his previously isolated bacterial pathogens.  We will add   fluconazole to treat his Candida albicans that is now in the wound as well as   related to his Andres catheter/ candiduria.  Monitor his QTc-it should be tolerated give his QTc of 473.  However, he is certainly at risk for QTc prolongation.  He is immunocompromised due to multiple   medications for his lupus and polycythemia vera.  He is on Hydrea, Imuran and   prednisone.  Anticipate he will have poor wound healing.  He does have   diabetes.  His blood sugars have been fairly well controlled throughout   admission with a range of  over the past week.  Continue to attempt to   control blood sugars to help promote healing and treatment of his current   infection.  He does have thrombocytosis, which is likely acting as an acute   phase reactant.  Suspect this is related to ongoing infection.  He has a   pancreatic cyst.  This will be followed up as an outpatient.  For the   Landon's gangrene, anticipate he will require antibiotics for 10-14 days   after his last surgery and date is not known at this time.  Further   recommendations per culture results and clinical course.    Thank you and we will follow with you. Discussed with hospitalist       ____________________________________     MD LINO WEBBER / DEANNA    DD:  08/02/2020 12:03:33  DT:   08/02/2020 13:15:07    D#:  [id number]  Job#:  [id number]

## 2020-08-02 NOTE — PROGRESS NOTES
"S: Wound vac in place maintaining suction.  Pain controlled.  Go draining clear.    O: /72   Pulse 78   Temp 37 °C (98.6 °F) (Oral)   Resp 20   Ht 1.88 m (6' 2.02\")   Wt 90.8 kg (200 lb 2.8 oz)   SpO2 95%     Intake/Output Summary (Last 24 hours) at 8/2/2020 0854  Last data filed at 8/2/2020 0500  Gross per 24 hour   Intake 1580 ml   Output 4940 ml   Net -3360 ml     Pt alert, NAD  abd soft  Go with clear yellow urine  Wound vac in place    Recent Labs     07/31/20  0441 08/01/20  0434 08/02/20  0359   WBC 8.4 9.5 9.2   RBC 4.46* 4.62* 4.83   HEMOGLOBIN 11.5* 11.6* 12.0*   HEMATOCRIT 37.7* 39.0* 40.8*   MCV 84.5 84.4 84.5   MCH 25.8* 25.1* 24.8*   MCHC 30.5* 29.7* 29.4*   RDW 56.3* 56.4* 56.9*   PLATELETCT 510* 591* 710*   MPV 11.0 10.9 10.8     Recent Labs     07/31/20  0441 08/01/20  0434 08/02/20  0359   SODIUM 135 136 139   POTASSIUM 4.0 4.2 3.9   CHLORIDE 101 102 103   CO2 27 28 31   GLUCOSE 194* 191* 101*   BUN 9 8 6*   CREATININE 0.73 0.81 0.84   CALCIUM 7.6* 7.7* 8.0*       Assessment:   70 yo M s/p multiple debridements for Landon's gangrene of the right scrotum, perineum, and inguinal canal.     Plan:  Cont wound vac - next change due 8/3 - Urology service will arrange - pt should be NPO p MN tonight.  Cont abx per ID recommendations.  Cont go until pt able to use urinal without disrupting wound vac seal.  "

## 2020-08-03 ENCOUNTER — ANESTHESIA EVENT (OUTPATIENT)
Dept: SURGERY | Facility: MEDICAL CENTER | Age: 70
DRG: 854 | End: 2020-08-03
Payer: MEDICARE

## 2020-08-03 ENCOUNTER — ANESTHESIA (OUTPATIENT)
Dept: SURGERY | Facility: MEDICAL CENTER | Age: 70
DRG: 854 | End: 2020-08-03
Payer: MEDICARE

## 2020-08-03 LAB
ANION GAP SERPL CALC-SCNC: 6 MMOL/L (ref 7–16)
BACTERIA WND AEROBE CULT: ABNORMAL
BACTERIA WND AEROBE CULT: ABNORMAL
BUN SERPL-MCNC: 6 MG/DL (ref 8–22)
CALCIUM SERPL-MCNC: 8.1 MG/DL (ref 8.4–10.2)
CHLORIDE SERPL-SCNC: 99 MMOL/L (ref 96–112)
CO2 SERPL-SCNC: 30 MMOL/L (ref 20–33)
CREAT SERPL-MCNC: 0.85 MG/DL (ref 0.5–1.4)
ERYTHROCYTE [DISTWIDTH] IN BLOOD BY AUTOMATED COUNT: 54.9 FL (ref 35.9–50)
GLUCOSE BLD-MCNC: 112 MG/DL (ref 65–99)
GLUCOSE BLD-MCNC: 114 MG/DL (ref 65–99)
GLUCOSE BLD-MCNC: 168 MG/DL (ref 65–99)
GLUCOSE BLD-MCNC: 84 MG/DL (ref 65–99)
GLUCOSE SERPL-MCNC: 131 MG/DL (ref 65–99)
GRAM STN SPEC: ABNORMAL
HCT VFR BLD AUTO: 39.1 % (ref 42–52)
HGB BLD-MCNC: 11.6 G/DL (ref 14–18)
INR PPP: 1.22 (ref 0.87–1.13)
MCH RBC QN AUTO: 24.9 PG (ref 27–33)
MCHC RBC AUTO-ENTMCNC: 29.7 G/DL (ref 33.7–35.3)
MCV RBC AUTO: 83.9 FL (ref 81.4–97.8)
PLATELET # BLD AUTO: 711 K/UL (ref 164–446)
PMV BLD AUTO: 10.7 FL (ref 9–12.9)
POTASSIUM SERPL-SCNC: 3.8 MMOL/L (ref 3.6–5.5)
PROTHROMBIN TIME: 15.6 SEC (ref 12–14.6)
RBC # BLD AUTO: 4.66 M/UL (ref 4.7–6.1)
SIGNIFICANT IND 70042: ABNORMAL
SITE SITE: ABNORMAL
SODIUM SERPL-SCNC: 135 MMOL/L (ref 135–145)
SOURCE SOURCE: ABNORMAL
WBC # BLD AUTO: 11.2 K/UL (ref 4.8–10.8)

## 2020-08-03 PROCEDURE — A9270 NON-COVERED ITEM OR SERVICE: HCPCS | Performed by: INTERNAL MEDICINE

## 2020-08-03 PROCEDURE — 85027 COMPLETE CBC AUTOMATED: CPT

## 2020-08-03 PROCEDURE — 85610 PROTHROMBIN TIME: CPT

## 2020-08-03 PROCEDURE — 700102 HCHG RX REV CODE 250 W/ 637 OVERRIDE(OP): Performed by: INTERNAL MEDICINE

## 2020-08-03 PROCEDURE — 80048 BASIC METABOLIC PNL TOTAL CA: CPT

## 2020-08-03 PROCEDURE — 0VJDXZZ INSPECTION OF TESTIS, EXTERNAL APPROACH: ICD-10-PCS | Performed by: UROLOGY

## 2020-08-03 PROCEDURE — 700111 HCHG RX REV CODE 636 W/ 250 OVERRIDE (IP): Performed by: ANESTHESIOLOGY

## 2020-08-03 PROCEDURE — 160038 HCHG SURGERY MINUTES - EA ADDL 1 MIN LEVEL 2: Performed by: UROLOGY

## 2020-08-03 PROCEDURE — 97606 NEG PRS WND THER DME>50 SQCM: CPT

## 2020-08-03 PROCEDURE — 700101 HCHG RX REV CODE 250: Performed by: ANESTHESIOLOGY

## 2020-08-03 PROCEDURE — 700105 HCHG RX REV CODE 258: Performed by: UROLOGY

## 2020-08-03 PROCEDURE — 160048 HCHG OR STATISTICAL LEVEL 1-5: Performed by: UROLOGY

## 2020-08-03 PROCEDURE — 160009 HCHG ANES TIME/MIN: Performed by: UROLOGY

## 2020-08-03 PROCEDURE — 700105 HCHG RX REV CODE 258: Performed by: INTERNAL MEDICINE

## 2020-08-03 PROCEDURE — 82962 GLUCOSE BLOOD TEST: CPT | Mod: 91

## 2020-08-03 PROCEDURE — 700111 HCHG RX REV CODE 636 W/ 250 OVERRIDE (IP): Performed by: INTERNAL MEDICINE

## 2020-08-03 PROCEDURE — 99232 SBSQ HOSP IP/OBS MODERATE 35: CPT | Performed by: INTERNAL MEDICINE

## 2020-08-03 PROCEDURE — 770006 HCHG ROOM/CARE - MED/SURG/GYN SEMI*

## 2020-08-03 PROCEDURE — 160027 HCHG SURGERY MINUTES - 1ST 30 MINS LEVEL 2: Performed by: UROLOGY

## 2020-08-03 PROCEDURE — 160002 HCHG RECOVERY MINUTES (STAT): Performed by: UROLOGY

## 2020-08-03 PROCEDURE — 160035 HCHG PACU - 1ST 60 MINS PHASE I: Performed by: UROLOGY

## 2020-08-03 PROCEDURE — 0VJ8XZZ INSPECTION OF SCROTUM AND TUNICA VAGINALIS, EXTERNAL APPROACH: ICD-10-PCS | Performed by: UROLOGY

## 2020-08-03 RX ORDER — HALOPERIDOL 5 MG/ML
1 INJECTION INTRAMUSCULAR
Status: DISCONTINUED | OUTPATIENT
Start: 2020-08-03 | End: 2020-08-03 | Stop reason: HOSPADM

## 2020-08-03 RX ORDER — OXYCODONE HCL 5 MG/5 ML
5 SOLUTION, ORAL ORAL
Status: DISCONTINUED | OUTPATIENT
Start: 2020-08-03 | End: 2020-08-03 | Stop reason: HOSPADM

## 2020-08-03 RX ORDER — LIDOCAINE HYDROCHLORIDE 20 MG/ML
INJECTION, SOLUTION EPIDURAL; INFILTRATION; INTRACAUDAL; PERINEURAL PRN
Status: DISCONTINUED | OUTPATIENT
Start: 2020-08-03 | End: 2020-08-03 | Stop reason: SURG

## 2020-08-03 RX ORDER — HYDROMORPHONE HYDROCHLORIDE 1 MG/ML
1 INJECTION, SOLUTION INTRAMUSCULAR; INTRAVENOUS; SUBCUTANEOUS
Status: DISCONTINUED | OUTPATIENT
Start: 2020-08-03 | End: 2020-08-15

## 2020-08-03 RX ORDER — DIPHENHYDRAMINE HYDROCHLORIDE 50 MG/ML
12.5 INJECTION INTRAMUSCULAR; INTRAVENOUS
Status: DISCONTINUED | OUTPATIENT
Start: 2020-08-03 | End: 2020-08-03 | Stop reason: HOSPADM

## 2020-08-03 RX ORDER — HYDROMORPHONE HYDROCHLORIDE 1 MG/ML
0.4 INJECTION, SOLUTION INTRAMUSCULAR; INTRAVENOUS; SUBCUTANEOUS
Status: DISCONTINUED | OUTPATIENT
Start: 2020-08-03 | End: 2020-08-03 | Stop reason: HOSPADM

## 2020-08-03 RX ORDER — ONDANSETRON 2 MG/ML
4 INJECTION INTRAMUSCULAR; INTRAVENOUS
Status: DISCONTINUED | OUTPATIENT
Start: 2020-08-03 | End: 2020-08-03 | Stop reason: HOSPADM

## 2020-08-03 RX ORDER — FLUCONAZOLE 200 MG/1
400 TABLET ORAL DAILY
Status: COMPLETED | OUTPATIENT
Start: 2020-08-04 | End: 2020-08-08

## 2020-08-03 RX ORDER — LIDOCAINE HYDROCHLORIDE 20 MG/ML
20 INJECTION, SOLUTION INFILTRATION; PERINEURAL
Status: DISCONTINUED | OUTPATIENT
Start: 2020-08-03 | End: 2020-08-22 | Stop reason: HOSPADM

## 2020-08-03 RX ORDER — HYDROMORPHONE HYDROCHLORIDE 1 MG/ML
0.2 INJECTION, SOLUTION INTRAMUSCULAR; INTRAVENOUS; SUBCUTANEOUS
Status: DISCONTINUED | OUTPATIENT
Start: 2020-08-03 | End: 2020-08-03 | Stop reason: HOSPADM

## 2020-08-03 RX ORDER — PHENYLEPHRINE HYDROCHLORIDE 10 MG/ML
INJECTION, SOLUTION INTRAMUSCULAR; INTRAVENOUS; SUBCUTANEOUS PRN
Status: DISCONTINUED | OUTPATIENT
Start: 2020-08-03 | End: 2020-08-03 | Stop reason: SURG

## 2020-08-03 RX ORDER — SODIUM CHLORIDE, SODIUM LACTATE, POTASSIUM CHLORIDE, CALCIUM CHLORIDE 600; 310; 30; 20 MG/100ML; MG/100ML; MG/100ML; MG/100ML
INJECTION, SOLUTION INTRAVENOUS CONTINUOUS
Status: ACTIVE | OUTPATIENT
Start: 2020-08-03 | End: 2020-08-04

## 2020-08-03 RX ORDER — LORAZEPAM 2 MG/ML
1 INJECTION INTRAMUSCULAR
Status: DISCONTINUED | OUTPATIENT
Start: 2020-08-03 | End: 2020-08-13

## 2020-08-03 RX ORDER — OXYCODONE HCL 5 MG/5 ML
10 SOLUTION, ORAL ORAL
Status: DISCONTINUED | OUTPATIENT
Start: 2020-08-03 | End: 2020-08-03 | Stop reason: HOSPADM

## 2020-08-03 RX ORDER — MEPERIDINE HYDROCHLORIDE 25 MG/ML
6.25 INJECTION INTRAMUSCULAR; INTRAVENOUS; SUBCUTANEOUS
Status: DISCONTINUED | OUTPATIENT
Start: 2020-08-03 | End: 2020-08-03 | Stop reason: HOSPADM

## 2020-08-03 RX ORDER — HYDROMORPHONE HYDROCHLORIDE 1 MG/ML
0.1 INJECTION, SOLUTION INTRAMUSCULAR; INTRAVENOUS; SUBCUTANEOUS
Status: DISCONTINUED | OUTPATIENT
Start: 2020-08-03 | End: 2020-08-03 | Stop reason: HOSPADM

## 2020-08-03 RX ADMIN — MORPHINE SULFATE 4 MG: 4 INJECTION INTRAVENOUS at 10:07

## 2020-08-03 RX ADMIN — OXYCODONE HYDROCHLORIDE 15 MG: 5 TABLET ORAL at 16:02

## 2020-08-03 RX ADMIN — INSULIN LISPRO 3 UNITS: 100 INJECTION, SOLUTION INTRAVENOUS; SUBCUTANEOUS at 21:48

## 2020-08-03 RX ADMIN — AMPICILLIN SODIUM AND SULBACTAM SODIUM 3 G: 2; 1 INJECTION, POWDER, FOR SOLUTION INTRAMUSCULAR; INTRAVENOUS at 11:04

## 2020-08-03 RX ADMIN — AMPICILLIN SODIUM AND SULBACTAM SODIUM 3 G: 2; 1 INJECTION, POWDER, FOR SOLUTION INTRAMUSCULAR; INTRAVENOUS at 17:12

## 2020-08-03 RX ADMIN — FENTANYL CITRATE 100 MCG: 50 INJECTION, SOLUTION INTRAMUSCULAR; INTRAVENOUS at 13:37

## 2020-08-03 RX ADMIN — AMPICILLIN SODIUM AND SULBACTAM SODIUM 3 G: 2; 1 INJECTION, POWDER, FOR SOLUTION INTRAMUSCULAR; INTRAVENOUS at 00:02

## 2020-08-03 RX ADMIN — OXYCODONE HYDROCHLORIDE 15 MG: 5 TABLET ORAL at 05:23

## 2020-08-03 RX ADMIN — OXYCODONE HYDROCHLORIDE 15 MG: 5 TABLET ORAL at 20:03

## 2020-08-03 RX ADMIN — OXYCODONE HYDROCHLORIDE 5 MG: 5 TABLET ORAL at 00:05

## 2020-08-03 RX ADMIN — MORPHINE SULFATE 4 MG: 4 INJECTION INTRAVENOUS at 15:12

## 2020-08-03 RX ADMIN — MORPHINE SULFATE 4 MG: 4 INJECTION INTRAVENOUS at 08:10

## 2020-08-03 RX ADMIN — PROPOFOL 140 MG: 10 INJECTION, EMULSION INTRAVENOUS at 13:36

## 2020-08-03 RX ADMIN — SODIUM CHLORIDE, POTASSIUM CHLORIDE, SODIUM LACTATE AND CALCIUM CHLORIDE: 600; 310; 30; 20 INJECTION, SOLUTION INTRAVENOUS at 13:25

## 2020-08-03 RX ADMIN — PHENYLEPHRINE HYDROCHLORIDE 200 MCG: 10 INJECTION INTRAVENOUS at 13:51

## 2020-08-03 RX ADMIN — LIDOCAINE HYDROCHLORIDE 100 MG: 20 INJECTION, SOLUTION EPIDURAL; INFILTRATION; INTRACAUDAL; PERINEURAL at 13:38

## 2020-08-03 RX ADMIN — MICONAZOLE NITRATE: 20 CREAM TOPICAL at 05:24

## 2020-08-03 RX ADMIN — MORPHINE SULFATE 4 MG: 4 INJECTION INTRAVENOUS at 03:23

## 2020-08-03 RX ADMIN — OMEPRAZOLE 20 MG: 20 CAPSULE, DELAYED RELEASE ORAL at 05:14

## 2020-08-03 RX ADMIN — MORPHINE SULFATE 4 MG: 4 INJECTION INTRAVENOUS at 12:32

## 2020-08-03 RX ADMIN — PHENYLEPHRINE HYDROCHLORIDE 100 MCG: 10 INJECTION INTRAVENOUS at 13:45

## 2020-08-03 RX ADMIN — AMPICILLIN SODIUM AND SULBACTAM SODIUM 3 G: 2; 1 INJECTION, POWDER, FOR SOLUTION INTRAMUSCULAR; INTRAVENOUS at 05:16

## 2020-08-03 RX ADMIN — FLUCONAZOLE 200 MG: 200 TABLET ORAL at 05:14

## 2020-08-03 ASSESSMENT — ENCOUNTER SYMPTOMS
COUGH: 0
FEVER: 0
BLURRED VISION: 0
CONSTIPATION: 0
CHILLS: 0
CLAUDICATION: 0
SENSORY CHANGE: 0
MYALGIAS: 0
DIZZINESS: 0
DIARRHEA: 0
VOMITING: 0
WEAKNESS: 0
PHOTOPHOBIA: 0
HEADACHES: 0
INSOMNIA: 0
HEARTBURN: 0
NERVOUS/ANXIOUS: 0
SHORTNESS OF BREATH: 0
ABDOMINAL PAIN: 0
SPEECH CHANGE: 0
DEPRESSION: 0

## 2020-08-03 ASSESSMENT — FIBROSIS 4 INDEX: FIB4 SCORE: 0.66

## 2020-08-03 ASSESSMENT — PAIN SCALES - GENERAL: PAIN_LEVEL: 3

## 2020-08-03 NOTE — ANESTHESIA PREPROCEDURE EVALUATION
Relevant Problems   NEURO   (+) History of ST elevation myocardial infarction (STEMI)   (+) History of stroke- old right parietal/occipital      CARDIAC   (+) CAD (coronary artery disease)   (+) Carotid stenosis, 90% right carotid   (+) Paroxysmal A-fib ([facility])   (+) STEMI (ST elevation myocardial infarction) ([facility])   (+) Stenosis of right carotid artery-Right CEA 3/21/18      ENDO   (+) Type 2 diabetes mellitus, with long-term current use of insulin ([facility])      Other   (+) Polycythemia vera ([facility])       Physical Exam    Airway   Mallampati: II  TM distance: >3 FB  Neck ROM: full       Cardiovascular - normal exam  Rhythm: regular  Rate: normal  (-) murmur     Dental - normal exam           Pulmonary - normal exam  Breath sounds clear to auscultation     Abdominal    Neurological - normal exam                 Anesthesia Plan    ASA 4       Plan - general             Induction: intravenous    Postoperative Plan: Postoperative administration of opioids is intended.    Pertinent diagnostic labs and testing reviewed    Informed Consent:    Anesthetic plan and risks discussed with patient.    Use of blood products discussed with: patient whom consented to blood products.

## 2020-08-03 NOTE — OR SURGEON
Immediate Post OP Note    PreOp Diagnosis: Scrotal wound infection    PostOp Diagnosis: Scrotal wound infection    Procedure(s):  INCISION AND DRAINAGE - SCROTAL WOUND - WOUND VAC PLACEMENT    Surgeon(s):  Jaylen Hayes M.D.    Anesthesiologist/Type of Anesthesia:  Anesthesiologist: Terrance Mc M.D./* No anesthesia type entered *    Surgical Staff:  Circulator: Ernestina Cerna R.N.  Scrub Person: Radhika Hughes    Specimens removed if any:  * No specimens in log *    Estimated Blood Loss: 0 mL    Findings: Wound clean and well debrided    Complications: None        8/3/2020 1:54 PM Jaylen Hayes M.D.

## 2020-08-03 NOTE — ANESTHESIA PROCEDURE NOTES
Airway  Performed by: Terrance Mc M.D.  Authorized by: Terrance Mc M.D.     Location:  OR  Urgency:  Elective  Indications for Airway Management:  Anesthesia      Spontaneous Ventilation: absent    Sedation Level:  Deep  Preoxygenated: Yes    Final Airway Type:  Supraglottic airway  Final Supraglottic Airway:  Standard LMA    SGA Size:  4  Number of Attempts at Approach:  1

## 2020-08-03 NOTE — PROGRESS NOTES
No change from morning assessment except mid-line placed today.  Of note patient refuses Humalog insulin with meals and also tends to refuse sliding scale coverage. States he is worried about low blood sugars .Plan of care reviewed again and questions answered as needed.

## 2020-08-03 NOTE — CARE PLAN
Problem: Pain Management  Goal: Pain level will decrease to patient's comfort goal  Outcome: PROGRESSING AS EXPECTED  Note: Pain managed with current PRN options     Problem: Fluid Volume:  Goal: Will maintain balanced intake and output  Outcome: PROGRESSING AS EXPECTED  Note: Good urine output     Problem: Communication  Goal: The ability to communicate needs accurately and effectively will improve  Outcome: PROGRESSING AS EXPECTED  Note: Pt calls appropriately, makes needs known     Problem: Safety  Goal: Will remain free from injury  Outcome: PROGRESSING AS EXPECTED  Intervention: Provide assistance with mobility  Flowsheets (Taken 7/29/2020 0900 by Becka Castro R.N.)  Assistance: Assistance of Two or More  Ambulation Tolerance:   Tolerates Poorly   Tires Quickly   General Weakness  Note: Refuses to mobilize d/t pain to scrotum with movement  Goal: Will remain free from falls  Outcome: PROGRESSING AS EXPECTED  Intervention: Assess risk factors for falls  Flowsheets (Taken 8/3/2020 0020)  Pt Calls for Assistance: Yes  History of fall: 0  Mobility Status Assessment: 2-2 Healthcare Providers Required for Assistance with Ambulation & Transfer  Intervention: Implement fall precautions  Flowsheets (Taken 8/2/2020 1931)  Environmental Precautions:   Treaded Slipper Socks on Patient   Personal Belongings, Wastebasket, Call Bell etc. in Easy Reach   Report Given to Other Health Care Providers Regarding Fall Risk   Bed in Low Position   Communication Sign for Patients & Families   Mobility Assessed & Appropriate Sign Placed     Problem: Infection  Goal: Will remain free from infection  Outcome: PROGRESSING AS EXPECTED  Note: Afebrile, continued IV abx     Problem: Venous Thromboembolism (VTW)/Deep Vein Thrombosis (DVT) Prevention:  Goal: Patient will participate in Venous Thrombosis (VTE)/Deep Vein Thrombosis (DVT)Prevention Measures  Outcome: PROGRESSING AS EXPECTED  Note: No s/s DVT currently     Problem:  Bowel/Gastric:  Goal: Normal bowel function is maintained or improved  Outcome: PROGRESSING AS EXPECTED  Flowsheets (Taken 8/2/2020 2043 by Iqra Salcedo, C.N.A.)  Last BM: 08/02/20  Goal: Will not experience complications related to bowel motility  Outcome: PROGRESSING AS EXPECTED     Problem: Knowledge Deficit  Goal: Knowledge of disease process/condition, treatment plan, diagnostic tests, and medications will improve  Outcome: PROGRESSING AS EXPECTED  Goal: Knowledge of the prescribed therapeutic regimen will improve  Outcome: PROGRESSING AS EXPECTED     Problem: Urinary Elimination:  Goal: Ability to reestablish a normal urinary elimination pattern will improve  Outcome: PROGRESSING AS EXPECTED  Flowsheets (Taken 8/2/2020 1931)  Urinary Elimination: Catheter (Document on LDA)  Note: Continued need for go d/t scrotal wound     Problem: Skin Integrity  Goal: Risk for impaired skin integrity will decrease  Outcome: PROGRESSING AS EXPECTED  Note: Wound vac change tomorrow, pressure injury to L buttock improving     Problem: Discharge Barriers/Planning  Goal: Patient's continuum of care needs will be met  Outcome: PROGRESSING SLOWER THAN EXPECTED  Note: Unknown d/c date/plan     Problem: Mobility  Goal: Risk for activity intolerance will decrease  Outcome: PROGRESSING SLOWER THAN EXPECTED  Note: Unwilling to mobilize d/t fear of scrotal pain with movement

## 2020-08-03 NOTE — PROGRESS NOTES
Pt back from surgery via hospital bed. VSS at this time. Requesting pain medication. To be administered. Wound  Dressing clean dry and intact.

## 2020-08-03 NOTE — ANESTHESIA POSTPROCEDURE EVALUATION
Patient: Francesco Schulte    Procedure Summary     Date:  08/03/20 Room / Location:   OR 02 / SURGERY [facility]    Anesthesia Start:  1331 Anesthesia Stop:  1417    Procedure:  INCISION AND DRAINAGE - SCROTAL WOUND - WOUND VAC PLACEMENT (Right Scrotum) Diagnosis:  (Landon's gangrene of scrotum)    Surgeon:  Jaylen Hayes M.D. Responsible Provider:  Terrance Mc M.D.    Anesthesia Type:  general ASA Status:  4          Final Anesthesia Type: general  Last vitals  BP   Blood Pressure : 150/82    Temp   36.3 °C (97.3 °F)    Pulse   Pulse: 70   Resp   18    SpO2   96 %      Anesthesia Post Evaluation    Patient location during evaluation: PACU  Patient participation: complete - patient cannot participate  Level of consciousness: awake and alert  Pain score: 3    Airway patency: patent  Anesthetic complications: no  Cardiovascular status: hemodynamically stable  Respiratory status: acceptable  Hydration status: euvolemic    PONV: none           Nurse Pain Score: 9 (NPRS)

## 2020-08-03 NOTE — PROGRESS NOTES
Assumed care of patient after receiving report from JOSLYN Jimenez. Patient is in bed, reporting pain. Medication to be given.VSS at this time. Patient is A&O x 4.  Denies SOB, chest pain and N/V. Wound dressing is clean, dry, intact with serous drainage in the wound vac. Patient educated about plan of care to which he verbalized agreement. Bed is locked, in lowest position and call light is within reach. Will monitor hourly.

## 2020-08-03 NOTE — ANESTHESIA TIME REPORT
Anesthesia Start and Stop Event Times     Date Time Event    8/3/2020 1330 Ready for Procedure     1331 Anesthesia Start        Responsible Staff  08/03/20    Name Role Begin End    Terrance Mc M.D. Anesth 1331         Preop Diagnosis (Free Text):  Pre-op Diagnosis     Landon's gangrene of scrotum        Preop Diagnosis (Codes):    Post op Diagnosis  Landon gangrene      Premium Reason  Non-Premium    Comments:

## 2020-08-03 NOTE — CARE PLAN
Problem: Communication  Goal: The ability to communicate needs accurately and effectively will improve  Outcome: PROGRESSING AS EXPECTED   Patient uses call light for any needs or questions.     Problem: Venous Thromboembolism (VTW)/Deep Vein Thrombosis (DVT) Prevention:  Goal: Patient will participate in Venous Thrombosis (VTE)/Deep Vein Thrombosis (DVT)Prevention Measures  Outcome: PROGRESSING AS EXPECTED   Patient is compliant with DVT prophylaxis, SCDs are on and lovenox to be administered

## 2020-08-03 NOTE — PROGRESS NOTES
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 male who presented 7/25/2020 with hx of CAD s/p PCI, pAF on eliquis, HTN, SLE on imuran & prednisone, polycytemia on hydroxyurea, recurrent rash on legs and buttocks followed by dermatology, recurrent cysts on his back needing I&D, Type II DM not well controlled insulin dependent (A1c 8.6%), has had a rash on his inner thigh that he is following up with Dermatology  Required insulin drip on admission    7/25: I&D right hemiscrotum  (Dr Power)  7/29: Further debridement (DR Donta Viera)  7/31: Further debridement with wound vac placement (Dr Rosales)  8/3: Wound vac changes under anesthesia (Dr Rosales)      Interval Problem Update  7/31 Patient feeling significant pain with any movement or contact to the perineal area.  He states he's concerned that there is still packing in place from the initial surgery.  He is going for washout and wound vac placement today with Dr Rosales.  8/1 Patient feeling okay other than severe pain in the scrotum from placement of wound vac and washout yesterday.  He remains afebrile.  ID to consult today for recommendation for antibiotics and duration of treatment given the multiple pathogens of diphtheroids, strep virdans and candida on cultures.    8/2 Patient doing okay with the increase of pain medication he received yesterday.  Urology planning another trip to the OR again tomorrow for washout.  ID consultation appreciated and midline recommended for course of treatment.  8/3 Patient doing about the same, will have a wound vac sponge change today under anesthesia with Dr Rosales.  Pain management will continue at same doses.    Review of Systems  Review of Systems   Constitutional: Negative for chills and fever.   HENT: Negative for congestion.    Eyes: Negative for blurred vision and photophobia.   Respiratory: Negative  for cough and shortness of breath.    Cardiovascular: Negative for chest pain, claudication and leg swelling.   Gastrointestinal: Negative for abdominal pain, constipation, diarrhea, heartburn and vomiting.   Genitourinary: Negative for dysuria and hematuria.        Significant scrotal pain     Musculoskeletal: Negative for joint pain and myalgias.   Skin: Negative for itching and rash.   Neurological: Negative for dizziness, sensory change, speech change, weakness and headaches.   Psychiatric/Behavioral: Negative for depression. The patient is not nervous/anxious and does not have insomnia.       Vital Signs for last 24 hours   Temp:  [36.6 °C (97.9 °F)-37.2 °C (99 °F)] 36.6 °C (97.9 °F)  Pulse:  [72-90] 72  Resp:  [16-20] 19  BP: (145-159)/(77-98) 145/77  SpO2:  [96 %-99 %] 99 %       Physical Exam   Physical Exam  Vitals signs and nursing note reviewed.   Constitutional:       Appearance: He is not ill-appearing.   HENT:      Head: Normocephalic and atraumatic.      Nose: Nose normal.      Mouth/Throat:      Mouth: Mucous membranes are moist.   Eyes:      Extraocular Movements: Extraocular movements intact.      Pupils: Pupils are equal, round, and reactive to light.   Neck:      Musculoskeletal: Normal range of motion.   Cardiovascular:      Rate and Rhythm: Normal rate.   Pulmonary:      Effort: Pulmonary effort is normal.   Abdominal:      General: Abdomen is flat. Bowel sounds are normal.      Palpations: Abdomen is soft.   Genitourinary:     Comments: Wound vac sponge in place in scrotum.  Andres in place    Musculoskeletal: Normal range of motion.   Skin:     General: Skin is warm.   Neurological:      General: No focal deficit present.      Mental Status: He is alert and oriented to person, place, and time. Mental status is at baseline.   Psychiatric:         Mood and Affect: Mood normal.         Behavior: Behavior normal.         Thought Content: Thought content normal.         Judgment: Judgment normal.        Medications  Current Facility-Administered Medications   Medication Dose Route Frequency Provider Last Rate Last Dose   • [START ON 8/4/2020] fluconazole (DIFLUCAN) tablet 400 mg  400 mg Oral DAILY Dipika Clemente M.D.       • ampicillin/sulbactam (UNASYN) 3 g in  mL IVPB  3 g Intravenous Q6HRS Pema Asif M.D. 200 mL/hr at 08/03/20 1104 3 g at 08/03/20 1104   • Maalox Plus - Diphenhydramine - Lidocaine (MBX Oral Solution)  5 mL Oral Q6HRS PRN Anitra Regan D.O.       • benzocaine-menthol (CEPACOL) lozenge 1 Lozenge  1 Lozenge Mouth/Throat Q2HRS PRN Anitra Regan D.O.   1 Lozenge at 08/02/20 1351   • morphine (pf) 4 MG/ML injection 2-4 mg  2-4 mg Intravenous Q2HRS PRN REMY MacO.   4 mg at 08/03/20 1007   • insulin glargine (Lantus) injection  22 Units Subcutaneous Q EVENING Julian Hood M.D.   22 Units at 08/02/20 1755    And   • insulin lispro (HumaLOG) injection  4 Units Subcutaneous TID AC Julian Hood M.D.   4 Units at 08/01/20 1631    And   • insulin lispro (HumaLOG) injection  3-14 Units Subcutaneous 4X/DAY ACHS Julian Hood M.D.   Stopped at 08/01/20 2100    And   • glucose 4 g chewable tablet 16 g  16 g Oral Q15 MIN PRN Julian Hood M.D.        And   • dextrose 50% (D50W) injection 50 mL  50 mL Intravenous Q15 MIN PRN Julian Hood M.D.       • oxyCODONE immediate-release (ROXICODONE) tablet 15 mg  15 mg Oral Q4HRS PRN Julian Hood M.D.   15 mg at 08/03/20 0523   • miconazole 2%-zinc oxide (Steven) topical cream   Topical BID Julian Hood M.D.       • multivitamin (THERAGRAN) tablet 1 Tab  1 Tab Oral DAILY Julian Hood M.D.   1 Tab at 08/02/20 0540   • hyoscyamine-maalox plus-lidocaine viscous (GI COCKTAIL) oral susp 15 mL  15 mL Oral Q4HRS PRN Julian Hood M.D.       • omeprazole (PRILOSEC) capsule 20 mg  20 mg Oral DAILY Dre Rosenbaum M.D.   20 mg at 08/03/20 0514   • enoxaparin (LOVENOX) inj 40 mg  40 mg Subcutaneous  DAILY Dre Rosenbaum M.D.   Stopped at 08/03/20 0900   • senna-docusate (PERICOLACE or SENOKOT S) 8.6-50 MG per tablet 2 Tab  2 Tab Oral BID Dre Rosenbaum M.D.   Stopped at 08/02/20 1800    And   • polyethylene glycol/lytes (MIRALAX) PACKET 1 Packet  1 Packet Oral QDAY CLAYTON Rosenbaum M.D.   1 Packet at 07/28/20 2311    And   • magnesium hydroxide (MILK OF MAGNESIA) suspension 30 mL  30 mL Oral QDAY CLAYTON Rosenbaum M.D.   30 mL at 07/29/20 0613    And   • bisacodyl (DULCOLAX) suppository 10 mg  10 mg Rectal QDAY CLAYTON Rosenbaum M.D.       • ondansetron (ZOFRAN) syringe/vial injection 4 mg  4 mg Intravenous Q4HRS CLAYTON Rosenbaum M.D.   4 mg at 08/01/20 1800   • ondansetron (ZOFRAN ODT) dispertab 4 mg  4 mg Oral Q4HRS CLAYTON Rosenbaum M.D.   4 mg at 07/31/20 0932     Fluids    Intake/Output Summary (Last 24 hours) at 8/3/2020 1146  Last data filed at 8/3/2020 1000  Gross per 24 hour   Intake 1240 ml   Output 4205 ml   Net -2965 ml     Laboratory          Recent Labs     08/01/20 0434 08/02/20 0359 08/03/20  0420   SODIUM 136 139 135   POTASSIUM 4.2 3.9 3.8   CHLORIDE 102 103 99   CO2 28 31 30   BUN 8 6* 6*   CREATININE 0.81 0.84 0.85   CALCIUM 7.7* 8.0* 8.1*     Recent Labs     08/01/20 0434 08/02/20 0359 08/03/20  0420   GLUCOSE 191* 101* 131*     Recent Labs     08/01/20 0434 08/02/20 0359 08/03/20  0420   WBC 9.5 9.2 11.2*   NEUTSPOLYS  --  79.90*  --    LYMPHOCYTES  --  9.90*  --    MONOCYTES  --  5.10  --    EOSINOPHILS  --  2.00  --    BASOPHILS  --  1.00  --      Recent Labs     08/01/20  0434 08/02/20  0359 08/03/20  0420   RBC 4.62* 4.83 4.66*   HEMOGLOBIN 11.6* 12.0* 11.6*   HEMATOCRIT 39.0* 40.8* 39.1*   PLATELETCT 591* 710* 711*   PROTHROMBTM  --   --  15.6*   INR  --   --  1.22*     Imaging  No new imaging    Assessment/Plan  * 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)  Assessment & Plan  unasyn to continue 7/28 - ID to  consult for antibiotic recommendations and duration of treatment. 10-14 day course after last surgery  OR again tomorrow per urology    Pain management with: oxy IR 10 q4H, morphine 2-4 mg IVP q2H and ketorolac PRN  Wound care following, wound vac placed again 7/31        CAD (coronary artery disease)- (present on admission)  Assessment & Plan  No acute events      Type 2 diabetes mellitus, with long-term current use of insulin ([facility])- (present on admission)  Assessment & Plan  lantus 22 units- whs  SSI      Paroxysmal A-fib ([facility])- (present on admission)  Assessment & Plan  Hold eliquis secondary to interventions  Rate controlled      Pancreatic cyst  Assessment & Plan  Follow up with imaging as outpatient      SLE (systemic lupus erythematosus related syndrome) ([facility])  Assessment & Plan  Prednisone on hold secondary to infection - watch for issues with adrenal insuffiencey       VTE:  Lovenox  Ulcer: PPI  Lines: None

## 2020-08-03 NOTE — OP REPORT
Preop diagnosis-scrotal abscess previously drained  Postop diagnosis-scrotal abscess previously drained  Operation performed dressing change  Surgeon-Jaylen Hayes MD  Anesthesiologist Terrance Mc MD  Anesthesia type-General anesthesia  Drains-none  Specimen-none  Complications-none  Estimated blood loss 0 mL    Procedure in detail:  The patient is brought in the operating room.  He is transferred from his hospital bed to the OR table.  He is then given an excellent general anesthetic by Dr. Mc.  He is then placed into the dorsal lithotomy position.  He is prepped and draped in usual fashion.  Timeout is done to confirm patient identification, procedure to be performed, review patient allergies and preoperative antibiotics.  Once timeout was completed the wound VAC dressing is removed from the scrotum.  There is an additional gauze dressing wrapping around the exposed right testicle.  The wound is carefully examined.  There is actually excellent granulation tissue throughout the wound.  There is no necrotic tissue requiring debridement.  At that point the wound team has taken over and they will replace the wound VAC dressing.

## 2020-08-03 NOTE — THERAPY
Missed Therapy     Patient Name: Francesco Schulte  Age:  69 y.o., Sex:  male  Medical Record #: [id number]  Today's Date: 8/3/2020    Discussed missed therapy with RN       08/03/20 0944   Interdisciplinary Plan of Care Collaboration   IDT Collaboration with  Nursing   Collaboration Comments  Pt has another I&D/washout planned for today. Will re-attempt therapy evaluation once post op for appropriate functional assessment.

## 2020-08-03 NOTE — PROGRESS NOTES
"Report received from MIKE Castro RN. Pt awake but drowsy, alert, appropriate and pleasant. Pt requesting pain med when available, will medicate with PRN option. Wound vac maintained to 125 mm Hg. Andres draining light yellow urine. VSS. /87   Pulse 78   Temp 37.2 °C (99 °F) (Oral)   Resp 20   Ht 1.88 m (6' 2.02\")   Wt 90.8 kg (200 lb 2.8 oz)   SpO2 97%   BMI 25.69 kg/m²      Call light in reach. Pt denies further needs at this time. Will continue to monitor.   "

## 2020-08-03 NOTE — OR NURSING
1145: Report rcvd from U RN, Cristela,. Pt will be brought from the floor via bed in time for his procedure.  1254: Pt brought to pre-op holding via bed by CNAs. Pt tolerated transfer well. Pain is tolerable, no nausea, awake and alert, on 3 LPM of supplemental O2.  1329: Patient allergies and NPO status verified, home medication reconciliation completed and belongings secured. Patient verbalizes understanding of pain scale, expected course of stay and plan of care. Surgical site verified with patient. IV access established. Sequentials placed on legs.

## 2020-08-03 NOTE — OR NURSING
1414: To PACU post I&D scrotal wound w/ wound vac. Pt is extubated, sleeping, breathing is spontaneous and unlabored. Vac at 125 continuous.  1430: Pt more awake. Denies pain.  1450: Meets criteria for transfer to room.

## 2020-08-04 LAB
ANION GAP SERPL CALC-SCNC: 10 MMOL/L (ref 7–16)
BUN SERPL-MCNC: 7 MG/DL (ref 8–22)
CALCIUM SERPL-MCNC: 8.4 MG/DL (ref 8.4–10.2)
CHLORIDE SERPL-SCNC: 99 MMOL/L (ref 96–112)
CO2 SERPL-SCNC: 26 MMOL/L (ref 20–33)
CREAT SERPL-MCNC: 0.83 MG/DL (ref 0.5–1.4)
ERYTHROCYTE [DISTWIDTH] IN BLOOD BY AUTOMATED COUNT: 57.1 FL (ref 35.9–50)
GLUCOSE BLD-MCNC: 111 MG/DL (ref 65–99)
GLUCOSE BLD-MCNC: 125 MG/DL (ref 65–99)
GLUCOSE BLD-MCNC: 127 MG/DL (ref 65–99)
GLUCOSE BLD-MCNC: 183 MG/DL (ref 65–99)
GLUCOSE BLD-MCNC: 236 MG/DL (ref 65–99)
GLUCOSE SERPL-MCNC: 164 MG/DL (ref 65–99)
HCT VFR BLD AUTO: 41 % (ref 42–52)
HGB BLD-MCNC: 12 G/DL (ref 14–18)
MCH RBC QN AUTO: 24.8 PG (ref 27–33)
MCHC RBC AUTO-ENTMCNC: 29.3 G/DL (ref 33.7–35.3)
MCV RBC AUTO: 84.7 FL (ref 81.4–97.8)
PLATELET # BLD AUTO: 682 K/UL (ref 164–446)
PMV BLD AUTO: 10.4 FL (ref 9–12.9)
POTASSIUM SERPL-SCNC: 4.9 MMOL/L (ref 3.6–5.5)
RBC # BLD AUTO: 4.84 M/UL (ref 4.7–6.1)
SODIUM SERPL-SCNC: 135 MMOL/L (ref 135–145)
WBC # BLD AUTO: 12.1 K/UL (ref 4.8–10.8)

## 2020-08-04 PROCEDURE — 700102 HCHG RX REV CODE 250 W/ 637 OVERRIDE(OP): Performed by: INTERNAL MEDICINE

## 2020-08-04 PROCEDURE — 97535 SELF CARE MNGMENT TRAINING: CPT

## 2020-08-04 PROCEDURE — A9270 NON-COVERED ITEM OR SERVICE: HCPCS | Performed by: INTERNAL MEDICINE

## 2020-08-04 PROCEDURE — 700105 HCHG RX REV CODE 258: Performed by: INTERNAL MEDICINE

## 2020-08-04 PROCEDURE — 770006 HCHG ROOM/CARE - MED/SURG/GYN SEMI*

## 2020-08-04 PROCEDURE — 85027 COMPLETE CBC AUTOMATED: CPT

## 2020-08-04 PROCEDURE — 80048 BASIC METABOLIC PNL TOTAL CA: CPT

## 2020-08-04 PROCEDURE — 82962 GLUCOSE BLOOD TEST: CPT

## 2020-08-04 PROCEDURE — 700111 HCHG RX REV CODE 636 W/ 250 OVERRIDE (IP): Performed by: INTERNAL MEDICINE

## 2020-08-04 PROCEDURE — 99233 SBSQ HOSP IP/OBS HIGH 50: CPT | Performed by: INTERNAL MEDICINE

## 2020-08-04 PROCEDURE — 99232 SBSQ HOSP IP/OBS MODERATE 35: CPT | Performed by: HOSPITALIST

## 2020-08-04 PROCEDURE — 36415 COLL VENOUS BLD VENIPUNCTURE: CPT

## 2020-08-04 RX ADMIN — ENOXAPARIN SODIUM 40 MG: 40 INJECTION SUBCUTANEOUS at 05:31

## 2020-08-04 RX ADMIN — ONDANSETRON 4 MG: 2 INJECTION INTRAMUSCULAR; INTRAVENOUS at 00:16

## 2020-08-04 RX ADMIN — THERA TABS 1 TABLET: TAB at 05:25

## 2020-08-04 RX ADMIN — OXYCODONE HYDROCHLORIDE 15 MG: 5 TABLET ORAL at 21:25

## 2020-08-04 RX ADMIN — MORPHINE SULFATE 4 MG: 4 INJECTION INTRAVENOUS at 00:14

## 2020-08-04 RX ADMIN — OXYCODONE HYDROCHLORIDE 15 MG: 5 TABLET ORAL at 15:41

## 2020-08-04 RX ADMIN — OMEPRAZOLE 20 MG: 20 CAPSULE, DELAYED RELEASE ORAL at 05:25

## 2020-08-04 RX ADMIN — OXYCODONE HYDROCHLORIDE 15 MG: 5 TABLET ORAL at 10:39

## 2020-08-04 RX ADMIN — AMPICILLIN SODIUM AND SULBACTAM SODIUM 3 G: 2; 1 INJECTION, POWDER, FOR SOLUTION INTRAMUSCULAR; INTRAVENOUS at 00:24

## 2020-08-04 RX ADMIN — INSULIN LISPRO 4 UNITS: 100 INJECTION, SOLUTION INTRAVENOUS; SUBCUTANEOUS at 10:52

## 2020-08-04 RX ADMIN — AMPICILLIN SODIUM AND SULBACTAM SODIUM 3 G: 2; 1 INJECTION, POWDER, FOR SOLUTION INTRAMUSCULAR; INTRAVENOUS at 10:40

## 2020-08-04 RX ADMIN — AMPICILLIN SODIUM AND SULBACTAM SODIUM 3 G: 2; 1 INJECTION, POWDER, FOR SOLUTION INTRAMUSCULAR; INTRAVENOUS at 05:25

## 2020-08-04 RX ADMIN — INSULIN LISPRO 4 UNITS: 100 INJECTION, SOLUTION INTRAVENOUS; SUBCUTANEOUS at 17:14

## 2020-08-04 RX ADMIN — INSULIN LISPRO 4 UNITS: 100 INJECTION, SOLUTION INTRAVENOUS; SUBCUTANEOUS at 10:55

## 2020-08-04 RX ADMIN — INSULIN GLARGINE 22 UNITS: 100 INJECTION, SOLUTION SUBCUTANEOUS at 17:12

## 2020-08-04 RX ADMIN — MICONAZOLE NITRATE: 20 CREAM TOPICAL at 05:28

## 2020-08-04 RX ADMIN — AMPICILLIN SODIUM AND SULBACTAM SODIUM 3 G: 2; 1 INJECTION, POWDER, FOR SOLUTION INTRAMUSCULAR; INTRAVENOUS at 17:05

## 2020-08-04 RX ADMIN — OXYCODONE HYDROCHLORIDE 15 MG: 5 TABLET ORAL at 05:42

## 2020-08-04 RX ADMIN — FLUCONAZOLE 400 MG: 200 TABLET ORAL at 05:25

## 2020-08-04 ASSESSMENT — ENCOUNTER SYMPTOMS
WEAKNESS: 1
FEVER: 0
MYALGIAS: 1
CONSTIPATION: 1
ABDOMINAL PAIN: 0
DIARRHEA: 0
SHORTNESS OF BREATH: 0
VOMITING: 0
CHILLS: 0
CARDIOVASCULAR NEGATIVE: 1
NAUSEA: 0
GASTROINTESTINAL NEGATIVE: 1
COUGH: 0
RESPIRATORY NEGATIVE: 1
EYES NEGATIVE: 1

## 2020-08-04 ASSESSMENT — PATIENT HEALTH QUESTIONNAIRE - PHQ9
SUM OF ALL RESPONSES TO PHQ9 QUESTIONS 1 AND 2: 0
1. LITTLE INTEREST OR PLEASURE IN DOING THINGS: NOT AT ALL
2. FEELING DOWN, DEPRESSED, IRRITABLE, OR HOPELESS: NOT AT ALL

## 2020-08-04 NOTE — PROGRESS NOTES
Received report from [place] JORGENSEN at 1900. Pt resting in bed. Family at bedside. Pt is awake and alert. Pt requested pain medication when available. Andres in place and draining, 1200mL output. 300mL output in wound vac, suction maintained at 125mmhg. Call light and belongings are within reach. Will continue to monitor.

## 2020-08-04 NOTE — PROGRESS NOTES
Infectious Disease Progress Note    Author: Dipika Clemente M.D. Date & Time of service: 2020  7:37 AM    Chief Complaint:  Landon's gangrene    Interval History:    Review of Systems:  Review of Systems   Constitutional: Negative for chills, fever and malaise/fatigue.   Respiratory: Negative for cough and shortness of breath.    Cardiovascular: Negative for chest pain.   Gastrointestinal: Positive for constipation. Negative for abdominal pain, diarrhea, nausea and vomiting.   Musculoskeletal: Positive for myalgias.       Hemodynamics:  Temp (24hrs), Av.6 °C (97.8 °F), Min:36.3 °C (97.3 °F), Max:36.9 °C (98.4 °F)  Temperature: 36.9 °C (98.4 °F)  Pulse  Av.1  Min: 64  Max: 110   Blood Pressure : 137/67       Physical Exam:  Physical Exam  Constitutional:       Appearance: Normal appearance. He is not ill-appearing.   Cardiovascular:      Rate and Rhythm: Normal rate and regular rhythm.      Heart sounds: Normal heart sounds.   Pulmonary:      Effort: Pulmonary effort is normal.      Breath sounds: Normal breath sounds.   Abdominal:      General: Abdomen is flat. There is no distension.      Palpations: Abdomen is soft.      Tenderness: There is abdominal tenderness. There is no guarding.   Genitourinary:     Comments: Scrotal edema, warmth, erythema and ttp, wound vac in place  Musculoskeletal:      Right lower leg: No edema.      Left lower leg: No edema.   Skin:     General: Skin is warm and dry.   Neurological:      General: No focal deficit present.      Mental Status: He is alert and oriented to person, place, and time.   Psychiatric:         Mood and Affect: Mood normal.         Behavior: Behavior normal.         Meds:    Current Facility-Administered Medications:   •  fluconazole  •  lidocaine  •  HYDROmorphone  •  LORazepam  •  ampicillin-sulbactam (UNASYN) IV  •  MBX  •  benzocaine-menthol  •  morphine injection  •  insulin glargine **AND** insulin lispro **AND** insulin lispro **AND**  POC Blood Glucose **AND** NOTIFY MD and PharmD **AND** glucose **AND** dextrose 50%  •  oxyCODONE immediate release  •  miconazole 2%-zinc oxide  •  multivitamin  •  hyoscyamine-maalox plus-lidocaine viscous  •  omeprazole  •  enoxaparin (LOVENOX) injection  •  senna-docusate **AND** polyethylene glycol/lytes **AND** magnesium hydroxide **AND** bisacodyl  •  ondansetron  •  ondansetron    Labs:  Recent Labs     08/02/20 0359 08/03/20 0420 08/04/20 0431   WBC 9.2 11.2* 12.1*   RBC 4.83 4.66* 4.84   HEMOGLOBIN 12.0* 11.6* 12.0*   HEMATOCRIT 40.8* 39.1* 41.0*   MCV 84.5 83.9 84.7   MCH 24.8* 24.9* 24.8*   RDW 56.9* 54.9* 57.1*   PLATELETCT 710* 711* 682*   MPV 10.8 10.7 10.4   NEUTSPOLYS 79.90*  --   --    LYMPHOCYTES 9.90*  --   --    MONOCYTES 5.10  --   --    EOSINOPHILS 2.00  --   --    BASOPHILS 1.00  --   --      Recent Labs     08/02/20 0359 08/03/20 0420 08/04/20  0431   SODIUM 139 135 135   POTASSIUM 3.9 3.8 4.9   CHLORIDE 103 99 99   CO2 31 30 26   GLUCOSE 101* 131* 164*   BUN 6* 6* 7*     Recent Labs     08/02/20 0359 08/03/20 0420 08/04/20  0431   CREATININE 0.84 0.85 0.83       Imaging:  Ct-abdomen-pelvis With    Result Date: 7/25/2020 7/25/2020 3:04 PM HISTORY/REASON FOR EXAM:  Hernia, complicated; ? founiers gangrene. TECHNIQUE/EXAM DESCRIPTION:   CT scan of the abdomen and pelvis with contrast. Contrast-enhanced helical scanning was obtained from the diaphragmatic domes through the pubic symphysis following the bolus administration of nonionic contrast without complication. 100 mL of Omnipaque 350 nonionic contrast was administered without complication. Low dose optimization technique was utilized for this CT exam including automated exposure control and adjustment of the mA and/or kV according to patient size. COMPARISON: No prior studies available. FINDINGS: CT Abdomen: Visualized lung bases show mild dependent atelectasis. Small pericardial effusion. The liver is unremarkable. Spleen is  enlarged measuring 16 cm. Adrenal glands are unremarkable. Bilateral kidney cysts. LEFT kidney shows nonobstructing stone measuring 5 mm. Cystic lesion in the pancreatic tail measuring 19 mm. The gallbladder is unremarkable. Minimally prominent retroperitoneal lymph nodes. CT Pelvis: Bladder is unremarkable. Mildly enlarged prostate. Appendix is not visualized.  No pericecal inflammatory changes. Extensive inflammatory changes in the scrotum, eccentric to the RIGHT with large amount of soft tissue gas present in the RIGHT hemiscrotum extending into the perineum.     1.  Diffuse scrotal edema, eccentric to the RIGHT with extensive soft tissue gas present particularly in the RIGHT groin and hemiscrotum, consistent with Landon's gangrene. 2.  No discrete drainable abscess demonstrated. 3.  Minimal retroperitoneal adenopathy, nonspecific. 4.  Nonobstructing LEFT kidney stone. 5.  Splenomegaly. 6.  Cystic lesion in the pancreatic tail measuring 19 mm.  Neoplasm is not excluded.  Consider further evaluation with MRI. These findings were discussed with AYALA HAMPTON on 7/25/2020 3:41 PM.    Dx-chest-portable (1 View)    Result Date: 7/29/2020 7/29/2020 11:07 AM HISTORY/REASON FOR EXAM:  Fever TECHNIQUE/EXAM DESCRIPTION AND NUMBER OF VIEWS: Single portable view of the chest. COMPARISON: None FINDINGS: No pulmonary infiltrates or consolidations are noted. No pleural effusion. No pneumothorax. Normal cardiopericardial silhouette.     1. No acute cardiopulmonary abnormalities are identified.    Dx-chest-portable (1 View)    Result Date: 7/25/2020 7/25/2020 2:28 PM HISTORY/REASON FOR EXAM:  Sepsis. Prior testicular cyst drainage procedure. Testicular pain. TECHNIQUE/EXAM DESCRIPTION AND NUMBER OF VIEWS: Single portable view of the chest. COMPARISON: 7/1/2020 FINDINGS: The mediastinal and cardiac silhouette is unremarkable. There is atherosclerosis of the aorta. The lung parenchyma is clear. There is no significant pleural  effusion. There is no visible pneumothorax. There are no acute bony abnormalities.     1.  There is no acute cardiopulmonary process.    Ir-midline Catheter Insertion Wo Guidance > Age 3    Result Date: 8/2/2020  HISTORY/REASON FOR EXAM:  Midline Placement   TECHNIQUE/EXAM DESCRIPTION AND NUMBER OF VIEWS: Midline insertion with ultrasound guidance.  FINDINGS: Midline insertion with Ultrasound Guidance was performed by qualified nursing staff without the assistance of a Radiologist. Midline positioning as measured by RN or as appropriate length of catheter selected.              Ultrasound-guided midline placement performed by qualified nursing staff as above.       Micro:  Results     Procedure Component Value Units Date/Time    Fungal Culture [[id number]] Collected:  07/31/20 1845    Order Status:  Completed Specimen:  Wound Updated:  08/03/20 1044     Significant Indicator NEG     Source WND     Site SCROTAL ABSCESS     Culture Result Culture in progress.    CULTURE WOUND W/ GRAM STAIN [[id number]]  (Abnormal) Collected:  07/31/20 1845    Order Status:  Completed Specimen:  Wound Updated:  08/03/20 1044     Significant Indicator POS     Source WND     Site SCROTAL ABSCESS     Culture Result Growth noted after further incubation, see below for  organism identification.       Gram Stain Result Many WBCs.  Rare Gram positive cocci.  Rare Yeast.       Culture Result Candida albicans  Rare growth      BLOOD CULTURE [[id number]] Collected:  07/28/20 1026    Order Status:  Completed Specimen:  Blood from Peripheral Updated:  08/02/20 1217     Significant Indicator NEG     Source BLD     Site PERIPHERAL     Culture Result No growth after 5 days of incubation.  Blood culture testing and Gram stain, if indicated, are  performed at [facility], [address] [place] [address].  Positive blood cultures are  sent to [facility], [address] [place] [address], for organism  "identification and  susceptibility testing.      Narrative:       Per Hospital Policy: Only change Specimen Src: to \"Line\" if  specified by physician order.  Right AC    BLOOD CULTURE [[id number]] Collected:  07/28/20 1013    Order Status:  Completed Specimen:  Blood from Peripheral Updated:  08/02/20 1217     Significant Indicator NEG     Source BLD     Site PERIPHERAL     Culture Result No growth after 5 days of incubation.  Blood culture testing and Gram stain, if indicated, are  performed at [facility], [address] [place] [address].  Positive blood cultures are  sent to [facility], [address] [place] [address], for organism identification and  susceptibility testing.      Narrative:       Per Hospital Policy: Only change Specimen Src: to \"Line\" if  specified by physician order.  Right Hand    Fungal Culture [[id number]] Collected:  07/25/20 2030    Order Status:  Completed Specimen:  Wound Updated:  08/01/20 1847     Significant Indicator NEG     Source WND     Site Scrotal Abscess     Culture Result No fungal growth to date.    Narrative:       1 - Scrotal abscess  Surgery - swabs received    Anaerobic Culture [[id number]]  (Abnormal) Collected:  07/25/20 2030    Order Status:  Completed Specimen:  Wound Updated:  08/01/20 1847     Significant Indicator POS     Source WND     Site Scrotal Abscess     Culture Result Growth noted after further incubation, see below for  organism identification.        Prevotella disiens  Moderate growth      Narrative:       1 - Scrotal abscess  Surgery - swabs received    CULTURE WOUND W/ GRAM STAIN [[id number]]  (Abnormal)  (Susceptibility) Collected:  07/25/20 2030    Order Status:  Completed Specimen:  Wound Updated:  08/01/20 1847     Significant Indicator POS     Source WND     Site Scrotal Abscess     Culture Result -     Gram Stain Result Many Gram positive cocci.  Moderate Gram positive rods.       Culture Result " Viridans Streptococcus  Heavy growth        Diphtheroids  Moderate growth  not J-K      Narrative:       1 - Scrotal abscess  Surgery - swabs received    Susceptibility     Viridans streptococcus (1)     Antibiotic Interpretation Microscan Method Status    Penicillin Sensitive 0.094 mcg/mL E-TEST Final    Cefotaxime Sensitive 0.38 mcg/mL E-TEST Final                   GRAM STAIN [[id number]] Collected:  07/31/20 1845    Order Status:  Completed Specimen:  Wound Updated:  08/01/20 1813     Significant Indicator .     Source WND     Site SCROTAL ABSCESS     Gram Stain Result Many WBCs.  Rare Gram positive cocci.  Rare Yeast.      AFB Culture [[id number]] Collected:  07/31/20 1845    Order Status:  Canceled Specimen:  Other     FLUID CULTURE W/GRAM STAIN [[id number]] Collected:  07/31/20 1845    Order Status:  Canceled Specimen:  Other     URINE CULTURE(NEW) [[id number]]  (Abnormal) Collected:  07/28/20 1906    Order Status:  Completed Specimen:  Urine, Hernandez Cath Updated:  07/31/20 0800     Significant Indicator POS     Source UR     Site URINE, HERNANDEZ CATH     Culture Result -      Candida albicans  10-50,000 cfu/mL      Narrative:       Collected By:EDU PINZON  Indication for culture:->Evaluation for sepsis without a  clear source of infection  Collected By:EDU PINZON    BLOOD CULTURE [[id number]] Collected:  07/25/20 1400    Order Status:  Completed Specimen:  Blood from Peripheral Updated:  07/30/20 1617     Significant Indicator NEG     Source BLD     Site PERIPHERAL     Culture Result No growth after 5 days of incubation.  Blood culture testing and Gram stain, if indicated, are  performed at [facility], [address] [place] [address].  Positive blood cultures are  sent to [facility], [address] [place] [address], for organism identification and  susceptibility testing.      Narrative:       Per Hospital Policy: Only change Specimen Src: to  "\"Line\" if  specified by physician order.  Left Forearm/Arm    BLOOD CULTURE [[id number]] Collected:  07/25/20 1400    Order Status:  Completed Specimen:  Blood from Peripheral Updated:  07/30/20 1617     Significant Indicator NEG     Source BLD     Site PERIPHERAL     Culture Result No growth after 5 days of incubation.  Blood culture testing and Gram stain, if indicated, are  performed at [facility], [address] [place] [address].  Positive blood cultures are  sent to [facility], [address] [place] [address], for organism identification and  susceptibility testing.      Narrative:       Per Hospital Policy: Only change Specimen Src: to \"Line\" if  specified by physician order.  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]   • Stenosis of right carotid artery-Right CEA 3/21/18 [I65.21]   • Coagulopathy ([facility]) [D68.9]   • STEMI (ST elevation myocardial infarction) ([facility]) [I21.3]   • Type 2 diabetes mellitus, with long-term current use of insulin ([facility]) [E11.9, Z79.4]   • CAD (coronary artery disease) [I25.10]   • Paroxysmal A-fib ([facility]) [I48.0]   • SLE (systemic lupus erythematosus related syndrome) ([facility]) [M32.9]   • Pancreatic cyst [K86.2]     Interval 24 hours:      AF, O2 2 L NC- new   Labs reviewed  Studies reviewed  Micro reviewed    Pt reporting pain with movement and BMs.  He is continued on unasyn and fluconazole.     Assessment:     A 69-year-old diabetic male with lupus, polycythemia vera, ischemic cardiomyopathy, atrial fibrillation on chronic anticoagulation,   admitted with Landon's gangrene.  Requiring multiple debridements, on 07/25, 07/29, 07/31 & 8/3.   Per op note on 8/3 there was excellent granulation tissue throughout the wound and no necrotic tissue requiring debridement.  The wound VAC was replaced.  Patient has been on clindamycin Zosyn vancomycin and then transition to Unasyn " on 7/28 with the addition of fluconazole on 8/2 due to growth of Candida albicans from the wound.    Plan:    Landon's gangrene   -Wound cultures from 7/28 with Candida albicans, wound culture from 7/25 with Prevotella, strep agalactiae, Virdens Streptococcus and diphtheroids  Leukocytosis, ongoing likely secondary to procedures rather than ongoing infection  Thrombocytosis, likely reactionary  SLE  Immunosuppressed   Polycythemia vera  Ischemic cardiomyopathy  A. fib on anticoagulation  Diabetes     --- Continue unasyn and fluconazole for now - once pt is preparing for discharge can transition unasyn to aumentin - will need another 7-10 day course of abx   --- Monitor labs  --- Limit immunosuppression as much as possible going forward, particularly for the next few weeks while recovering from infection   --- Monitor and control blood sugars to aid in healing and prevention of infection   --- Aggressive wound care and wound vac per surgery       Discussed with internal medicine, Dr. Cerna.  ID will follow.

## 2020-08-04 NOTE — PROGRESS NOTES
"S: Wound vac in place maintaining suction.  Pain controlled.  Go draining clear.    O: /67   Pulse 83   Temp 36.9 °C (98.4 °F) (Oral)   Resp 18   Ht 1.88 m (6' 2\")   Wt 92.3 kg (203 lb 7.8 oz)   SpO2 98%     Intake/Output Summary (Last 24 hours) at 8/2/2020 0854  Last data filed at 8/2/2020 0500  Gross per 24 hour   Intake 1580 ml   Output 4940 ml   Net -3360 ml     Pt alert, NAD  abd soft  Go with clear yellow urine  Wound vac in place    Recent Labs     08/02/20  0359 08/03/20  0420 08/04/20  0431   WBC 9.2 11.2* 12.1*   RBC 4.83 4.66* 4.84   HEMOGLOBIN 12.0* 11.6* 12.0*   HEMATOCRIT 40.8* 39.1* 41.0*   MCV 84.5 83.9 84.7   MCH 24.8* 24.9* 24.8*   MCHC 29.4* 29.7* 29.3*   RDW 56.9* 54.9* 57.1*   PLATELETCT 710* 711* 682*   MPV 10.8 10.7 10.4     Recent Labs     08/02/20  0359 08/03/20  0420 08/04/20  0431   SODIUM 139 135 135   POTASSIUM 3.9 3.8 4.9   CHLORIDE 103 99 99   CO2 31 30 26   GLUCOSE 101* 131* 164*   BUN 6* 6* 7*   CREATININE 0.84 0.85 0.83   CALCIUM 8.0* 8.1* 8.4       Assessment:   68 yo M s/p multiple debridements for Landon's gangrene of the right scrotum, perineum, and inguinal canal.     Plan:  Cont wound vac - next change due 8/5.  Cont abx per ID recommendations.  Cont go until pt able to use urinal without disrupting wound vac seal.  "

## 2020-08-04 NOTE — PROGRESS NOTES
Messaged Dr. Regan regarding pts daughters request for a visit from oncologist. Replied back with message that an oncology consult is not appropriate at this time.

## 2020-08-04 NOTE — PROGRESS NOTES
Pt attempted to get up at 0000 x3 assist with front wheel walker. Pt became light headed and dizzy. Pt laid back down on the bed and stated nausea and 9/10 pain. Vital signs checked at 0013 /77 HR 92, 90% on room air. Zofran and morphine given prn per MAR. Blood glucose checked at 0021 was 127. Will continue to monitor.

## 2020-08-04 NOTE — PROGRESS NOTES
Assumed care of patient after receiving report from JOSLYN Bray. Patient in bed eating breakfast. Denies SOB, chest pain. Patient is A&O x 4. VSS at this time. Patient's wound dressing is clean, dry and intact. Dressing to be changed 8/5. Some redness in the suprapublic and groin area. Patient's go catheter is below bed and draining. Wound vac set at 125. Drainage is serosanguinous. Patient's lower extremities edematous, 1+ bilaterally. Will monitor hourly. Patient's bed is locked, in lowest position and call light within reach.

## 2020-08-04 NOTE — THERAPY
"Missed Therapy     Patient Name: Francesco Schulte  Age:  69 y.o., Sex:  male  Medical Record #: [id number]  Today's Date: 8/4/2020    Discussed missed therapy with RN       08/04/20 1400   Interdisciplinary Plan of Care Collaboration   Collaboration Comments Attempted OT eval, pt refusing to attempt mobilizing, wants a \"donut pillow\" for comfort before trying to sit.  Per RN family plans to try and bring in a \"donut pillow after 3;30 today.  This therapist will not be avail to see pt at that time.  Will follow with pt as able 8/5/20.     "

## 2020-08-04 NOTE — THERAPY
Missed Therapy     Patient Name: Francesco Schulte  Age:  69 y.o., Sex:  male  Medical Record #: [id number]  Today's Date: 8/4/2020    Discussed missed therapy with RN       08/04/20 1201   Interdisciplinary Plan of Care Collaboration   IDT Collaboration with  Nursing   Patient Position at End of Therapy In Bed   Collaboration Comments Attempted PT evaluation; Pt educated on benefits of mobility and having skilled hands to assist with bed mobility to assist with pain management. However, pt declined therapy services for today and states he will not try to mobilize today, however, is agreeable to continue perfroming supine theraputic exercises. Pt educated on consequences of immobility while in bed, however, pt continued to decline. RN ordered waffle cushions for bottom, will re-attempt if patient agreeable to placing waffle cushions on bottom during bed mobility.

## 2020-08-04 NOTE — PROGRESS NOTES
Gave report to America JORGENSEN at 0650. Pt is resting in bed. Belongings and call light are within reach. Bed in lowest and locked position.

## 2020-08-04 NOTE — WOUND TEAM
[facility]  Inpatient Services  Wound and Skin Care Progress note    Admission Date: 7/25/2020     Last order of IP CONSULT TO WOUND CARE was found on 7/30/2020 from Hospital Encounter on 7/25/2020     HPI, PMH, SH: Reviewed    Unit where seen by Wound Team: 2215/01     WOUND CONSULT/FOLLOW-UP RELATED TO:  NPWT Change under sedation    Self Report / Pain Level:  No s/s of distress      OBJECTIVE:  drsg intact, pressure redistribution mattress    WOUND TYPE, LOCATION, CHARACTERISTICS (Pressure Injuries: location, stage, POA or date identified)  Wound 07/25/20 Full Thickness Wound Scrotum Right (Active)       08/03/20 1430   Site Assessment Red;Pink;Drainage    Periwound Assessment Intact    Margins Defined edges    Closure Secondary intention    Drainage Amount Small    Drainage Description Serosanguineous;Purulent    Treatments Cleansed    Wound Cleansing Normal Saline Irrigation    Periwound Protectant Benzoin;Paste Ring    Dressing Cleansing/Solutions Not Applicable    Dressing Options Wound Vac    Dressing Changed Changed    Dressing Status Intact    Dressing Change/Treatment Frequency Monday, Wednesday, Friday, and As Needed    NEXT Dressing Change/Treatment Date 08/05/20    NEXT Weekly Photo (Inpatient Only) 08/10/20    Non-staged Wound Description Full thickness    Wound Length (cm) 13 cm 08/03/20 1430   Wound Width (cm) 8 cm 08/03/20 1430   Wound Depth (cm) 5 cm 08/03/20 1430   Wound Surface Area (cm^2) 104 cm^2 08/03/20 1430   Wound Volume (cm^3) 520 cm^3 08/03/20 1430   Wound Healing % -1067 08/03/20 1430   Wound Bed Granulation (%) 80 % 08/03/20 1430   Tunneling (cm) 12 cm 08/03/20 1430   Tunneling Clock Position of Wound 10 08/03/20 1430   Tunneling - 2nd Location (cm) 10 cm 08/03/20 1430   Tunneling Clock Position of Wound - 2nd Location 11 08/03/20 1430   Undermining (cm) 4.3 cm 07/29/20 1600   Undermining of Wound, 1st Location From 9 o'clock;To 10 o'clock 07/29/20 1600   Undermining  (cm) - 2nd location 3.4 cm 07/27/20 1900   Undermining of Wound, 2nd Location From 2 o'clock;To 3 o'clock 07/27/20 1900   Shape irregular    Wound Odor None    Pulses N/A    Exposed Structures Connective tissue. R testicle    Number of days: 9     Negative Pressure Wound Therapy 07/27/20 Surgical (Active)   NPWT Pump Mode / Pressure Setting Continuous;125 mmHg 08/03/20 1430   Dressing Type Medium;Black Foam (Veraflo) 08/03/20 1430   Number of Foam Pieces Used 2 08/03/20 1430   Canister Changed No 08/03/20 1430   Output (mL) 120 mL 08/03/20 0000   NEXT Dressing Change/Treatment Date 08/05/20 08/03/20 1430   VAC VeraFlo Irrigant Normal Saline 08/03/20 1430   VAC VeraFlo Soak Time (mins) 4 08/03/20 1430   VAC VeraFlo Instill Volume (ml) 16 08/03/20 1430   VAC VeraFlo - Therapy Time (hrs) 2.5 08/03/20 1430   VAC VeraFlo Pressure (mm/Hg) 125 mmHg 08/03/20 1430       Vascular:    JW:   No results found.      Lab Values:    Lab Results   Component Value Date/Time    WBC 11.2 (H) 08/03/2020 04:20 AM    RBC 4.66 (L) 08/03/2020 04:20 AM    HEMOGLOBIN 11.6 (L) 08/03/2020 04:20 AM    HEMATOCRIT 39.1 (L) 08/03/2020 04:20 AM    CREACTPROT 0.58 07/01/2020 07:03 AM    SEDRATEWES 15 07/01/2020 07:03 AM    HBA1C 8.6 (A) 01/07/2020 07:14 AM            Culture Results show:  Recent Results (from the past 720 hour(s))   CULTURE WOUND W/ GRAM STAIN    Collection Time: 07/31/20  6:45 PM    Specimen: Wound   Result Value Ref Range    Significant Indicator POS (POS)     Source WND     Site SCROTAL ABSCESS     Culture Result (A)      Growth noted after further incubation, see below for  organism identification.      Gram Stain Result       Many WBCs.  Rare Gram positive cocci.  Rare Yeast.      Culture Result Candida albicans  Rare growth   (A)          INTERVENTIONS BY WOUND TEAM: Dr Hayes removed drsg. Cleaned wound and periwound with NS, dried. Benzoin and paste rings x 2 to periwound.  Adaptic around R testicle. Medium Veraflow drsg  peeled apart to become long drsg, applied into wound bed and wrapped around testicle, 2nd piece to fill in center of wound. Sealed and resumed Veraflow NPWT with NS 16 ml for 4 min dwell every 2.5 H with 125 mmHg pressure. Pt placed back onto hospital bed and extra drape and paste ring sent in chart.      Interdisciplinary consultation: Patient, Bedside RN      EVALUATION: Pt's wound with red granulation tissue. Has tunnel in 2 areas into R inguinal area. Dr Hayes doesn't feel pt needs any I&D @ this time since there was no purulent drainage present with drsg removal or irrigation. No odor. Discussion with Dr Regan for medication to be used @ next drsg change @ bs. Pt will be getting Ativan, pain medication and lidocaine. Adhesive remover available for possible easier drsg removal.     Goals: Steady decrease in wound area and depth weekly.    NURSING PLAN OF CARE ORDERS (X):     Dressing changes: Continue previous Dressing Maintenance orders: x       See new Dressing Maintenance orders:       Skin care: See Skin Care orders:        Rectal tube care: See Rectal Tube Care orders:      Other orders:           WOUND TEAM PLAN OF CARE:  Dressing changes by wound team:  x        Follow up 1-2 times weekly:               Follow up 3 times weekly:                NPWT change 3 times weekly:     Follow up as needed:       Other (explain):     Anticipated discharge plans:   LTACH:        SNF/Rehab:                  Home Care:           Outpatient Wound Center:            Self Care:           Other: will need VAC and drsg changes

## 2020-08-04 NOTE — CARE PLAN
Problem: Pain Management  Goal: Pain level will decrease to patient's comfort goal  8/4/2020 0200 by Katarina Lynne R.N.  Outcome: PROGRESSING AS EXPECTED  Note: Pt uses 1 to 10 pain scale to communicate pain. Pain currently managed with PRN medications per MAR. Will continue to monitor.     Problem: Venous Thromboembolism (VTW)/Deep Vein Thrombosis (DVT) Prevention:  Goal: Patient will participate in Venous Thrombosis (VTE)/Deep Vein Thrombosis (DVT)Prevention Measures  Outcome: PROGRESSING AS EXPECTED  Note: Pt's SCD's are on. Pt is compliant with wearing SCD's. Will continue to monitor.

## 2020-08-04 NOTE — PROGRESS NOTES
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 Schulte is a 69 y.o. male with a PMhx of CAD s/p PCI, pAF on eliquis, HTN, SLE on imuran & prednisone, polycytemia on hydroxyurea,who presented 7/25/2020 due to  recurrent rash on legs and buttocks followed by dermatology, recurrent cysts on his back needing I&D, Type II DM not well controlled insulin dependent (A1c 8.6%), has had a rash on his inner thigh that he is following up with Dermatology. Required insulin drip on admission.  Patient admitted for further evaluation and treatment. On 7/25: I&D right hemiscrotum  (Dr Power), 7/29: Further debridement (Dr. Viera)  7/31: Further debridement with wound vac placement (Dr Rosales), 8/3: Wound vac changes under anesthesia (Dr Rosales).        Interval Problem Update  -Patient seen and examined today.  Patient reports feeling a lot of pain and discomfort on scrotal area.  Patient willing to work with PT/OT, but has been unable to do so due to severe pain.  Patient aware and POC  -POC: Continue antibiotic therapy per ID recommendation; Unasyn and fluconazole for now -we will transition to Augmentin for 7-10-day course once preparing for discharge; control blood sugar; aggressive wound care and wound vac check; per urology, next wound VAC change on 8/5/2020; continue Andres until patient able to use urinal without disrupting wound vac seal; pain control; work with PT/OT; possible SNF.  -Lab work: Reviewed; expected  -VSS at this time  -Ordered: CBC and BMP for a.m.    Consultants/Specialty  -Urology -Dr. Rosales  -Infectious disease -Dr. Clemente    Code Status  Full    Disposition  TBD    Review of Systems  Review of Systems   Constitutional: Positive for malaise/fatigue. Negative for chills and fever.   HENT: Negative.    Eyes: Negative.    Respiratory: Negative.    Cardiovascular:  Negative.    Gastrointestinal: Negative.    Genitourinary: Positive for dysuria.   Musculoskeletal: Positive for myalgias.   Skin: Negative.    Neurological: Positive for weakness.        Physical Exam  Temp:  [36.3 °C (97.3 °F)-36.9 °C (98.4 °F)] 36.7 °C (98 °F)  Pulse:  [70-92] 78  Resp:  [12-18] 18  BP: (113-173)/(64-99) 113/71  SpO2:  [85 %-100 %] 94 %    Physical Exam  Vitals signs and nursing note reviewed.   Constitutional:       Appearance: He is ill-appearing.   HENT:      Head: Normocephalic.      Nose: Nose normal.      Mouth/Throat:      Mouth: Mucous membranes are moist.   Eyes:      Pupils: Pupils are equal, round, and reactive to light.   Neck:      Musculoskeletal: Neck supple.   Cardiovascular:      Rate and Rhythm: Normal rate and regular rhythm.      Pulses: Normal pulses.      Heart sounds: Normal heart sounds.   Pulmonary:      Effort: Pulmonary effort is normal.   Abdominal:      General: Bowel sounds are normal.   Genitourinary:     Penis: Normal.       Comments: Scrotal swelling; wound vac in place  Musculoskeletal: Normal range of motion.   Skin:     General: Skin is warm.      Capillary Refill: Capillary refill takes 2 to 3 seconds.   Neurological:      Mental Status: He is alert. Mental status is at baseline.         Fluids    Intake/Output Summary (Last 24 hours) at 8/4/2020 1131  Last data filed at 8/4/2020 0555  Gross per 24 hour   Intake 1256.83 ml   Output 1930 ml   Net -673.17 ml       Laboratory  Recent Labs     08/02/20 0359 08/03/20 0420 08/04/20  0431   WBC 9.2 11.2* 12.1*   RBC 4.83 4.66* 4.84   HEMOGLOBIN 12.0* 11.6* 12.0*   HEMATOCRIT 40.8* 39.1* 41.0*   MCV 84.5 83.9 84.7   MCH 24.8* 24.9* 24.8*   MCHC 29.4* 29.7* 29.3*   RDW 56.9* 54.9* 57.1*   PLATELETCT 710* 711* 682*   MPV 10.8 10.7 10.4     Recent Labs     08/02/20 0359 08/03/20  0420 08/04/20  0431   SODIUM 139 135 135   POTASSIUM 3.9 3.8 4.9   CHLORIDE 103 99 99   CO2 31 30 26   GLUCOSE 101* 131* 164*   BUN 6* 6* 7*    CREATININE 0.84 0.85 0.83   CALCIUM 8.0* 8.1* 8.4     Recent Labs     08/03/20  0420   INR 1.22*               Imaging  IR-MIDLINE CATHETER INSERTION WO GUIDANCE > AGE 3   Final Result                  Ultrasound-guided midline placement performed by qualified nursing staff    as above.          DX-CHEST-PORTABLE (1 VIEW)   Final Result         1. No acute cardiopulmonary abnormalities are identified.      CT-ABDOMEN-PELVIS WITH   Final Result      1.  Diffuse scrotal edema, eccentric to the RIGHT with extensive soft tissue gas present particularly in the RIGHT groin and hemiscrotum, consistent with Landon's gangrene.   2.  No discrete drainable abscess demonstrated.   3.  Minimal retroperitoneal adenopathy, nonspecific.   4.  Nonobstructing LEFT kidney stone.   5.  Splenomegaly.   6.  Cystic lesion in the pancreatic tail measuring 19 mm.  Neoplasm is not excluded.  Consider further evaluation with MRI.      These findings were discussed with AYALA HAMPTON on 7/25/2020 3:41 PM.      DX-CHEST-PORTABLE (1 VIEW)   Final Result      1.  There is no acute cardiopulmonary process.           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 Clemente: Continue Unasyn and fluconazole for now, will need to transition to Augmentin for 7-10-day course once prepping for discharge; control blood sugar  -Wound VAC in place; placed on 8/3 with urology, Dr. Rosales; next wound dressing changed on 8/5  -Pain control  -Wound care  -Ordered PT/OT  -Referred SNF placement    CAD (coronary artery disease)- (present on admission)  Assessment & Plan  No acute events      Type 2 diabetes mellitus, with long-term current use of insulin (HCC)- (present on admission)  Assessment & Plan  -Lantus 22 units- whs  -SSI      Paroxysmal A-fib (HCC)- (present on admission)  Assessment & Plan  -Hold eliquis secondary to interventions  -Rate controlled      Pancreatic cyst  Assessment &  Plan  Follow up with imaging as outpatient      SLE (systemic lupus erythematosus related syndrome) (HCC)  Assessment & Plan  Prednisone on hold secondary to infection - watch for issues with adrenal insuffiencey         VTE prophylaxis: SCDs  ==============================================================================================================================  Please note that this dictation was created using voice recognition software. I have made every reasonable attempt to correct obvious errors, but there may be errors of grammar and possibly content that I did not discover before finalizing the note.    Electronically signed by:  RASHEL Mari, MSN, APRN, FNP-C  Hospitalist Services  [facility]  [phone]  [email or web address]  08/04/20    4675

## 2020-08-05 LAB
ALBUMIN SERPL BCP-MCNC: 2.2 G/DL (ref 3.2–4.9)
ANION GAP SERPL CALC-SCNC: 8 MMOL/L (ref 7–16)
BUN SERPL-MCNC: 5 MG/DL (ref 8–22)
CALCIUM SERPL-MCNC: 8.5 MG/DL (ref 8.4–10.2)
CHLORIDE SERPL-SCNC: 100 MMOL/L (ref 96–112)
CO2 SERPL-SCNC: 28 MMOL/L (ref 20–33)
CREAT SERPL-MCNC: 0.78 MG/DL (ref 0.5–1.4)
ERYTHROCYTE [DISTWIDTH] IN BLOOD BY AUTOMATED COUNT: 56.4 FL (ref 35.9–50)
GLUCOSE BLD-MCNC: 103 MG/DL (ref 65–99)
GLUCOSE BLD-MCNC: 123 MG/DL (ref 65–99)
GLUCOSE BLD-MCNC: 138 MG/DL (ref 65–99)
GLUCOSE BLD-MCNC: 94 MG/DL (ref 65–99)
GLUCOSE SERPL-MCNC: 130 MG/DL (ref 65–99)
HCT VFR BLD AUTO: 41 % (ref 42–52)
HGB BLD-MCNC: 12.2 G/DL (ref 14–18)
MCH RBC QN AUTO: 25.1 PG (ref 27–33)
MCHC RBC AUTO-ENTMCNC: 29.8 G/DL (ref 33.7–35.3)
MCV RBC AUTO: 84.4 FL (ref 81.4–97.8)
PHOSPHATE SERPL-MCNC: 2.8 MG/DL (ref 2.5–4.5)
PLATELET # BLD AUTO: 663 K/UL (ref 164–446)
PMV BLD AUTO: 11.2 FL (ref 9–12.9)
POTASSIUM SERPL-SCNC: 4.1 MMOL/L (ref 3.6–5.5)
RBC # BLD AUTO: 4.86 M/UL (ref 4.7–6.1)
SODIUM SERPL-SCNC: 136 MMOL/L (ref 135–145)
WBC # BLD AUTO: 8.2 K/UL (ref 4.8–10.8)

## 2020-08-05 PROCEDURE — 700111 HCHG RX REV CODE 636 W/ 250 OVERRIDE (IP): Performed by: INTERNAL MEDICINE

## 2020-08-05 PROCEDURE — 700111 HCHG RX REV CODE 636 W/ 250 OVERRIDE (IP): Performed by: NURSE PRACTITIONER

## 2020-08-05 PROCEDURE — 80069 RENAL FUNCTION PANEL: CPT

## 2020-08-05 PROCEDURE — 700105 HCHG RX REV CODE 258: Performed by: HOSPITALIST

## 2020-08-05 PROCEDURE — 700105 HCHG RX REV CODE 258: Performed by: INTERNAL MEDICINE

## 2020-08-05 PROCEDURE — A9270 NON-COVERED ITEM OR SERVICE: HCPCS | Performed by: INTERNAL MEDICINE

## 2020-08-05 PROCEDURE — 97606 NEG PRS WND THER DME>50 SQCM: CPT

## 2020-08-05 PROCEDURE — 770006 HCHG ROOM/CARE - MED/SURG/GYN SEMI*

## 2020-08-05 PROCEDURE — 700102 HCHG RX REV CODE 250 W/ 637 OVERRIDE(OP): Performed by: HOSPITALIST

## 2020-08-05 PROCEDURE — 700102 HCHG RX REV CODE 250 W/ 637 OVERRIDE(OP): Performed by: INTERNAL MEDICINE

## 2020-08-05 PROCEDURE — 700101 HCHG RX REV CODE 250: Performed by: INTERNAL MEDICINE

## 2020-08-05 PROCEDURE — A9270 NON-COVERED ITEM OR SERVICE: HCPCS | Performed by: HOSPITALIST

## 2020-08-05 PROCEDURE — 85027 COMPLETE CBC AUTOMATED: CPT

## 2020-08-05 PROCEDURE — 82962 GLUCOSE BLOOD TEST: CPT | Mod: 91

## 2020-08-05 PROCEDURE — 99233 SBSQ HOSP IP/OBS HIGH 50: CPT | Performed by: HOSPITALIST

## 2020-08-05 PROCEDURE — 36415 COLL VENOUS BLD VENIPUNCTURE: CPT

## 2020-08-05 RX ORDER — SODIUM CHLORIDE 9 MG/ML
INJECTION, SOLUTION INTRAVENOUS CONTINUOUS
Status: DISCONTINUED | OUTPATIENT
Start: 2020-08-05 | End: 2020-08-11

## 2020-08-05 RX ADMIN — OXYCODONE HYDROCHLORIDE 15 MG: 5 TABLET ORAL at 03:34

## 2020-08-05 RX ADMIN — ENOXAPARIN SODIUM 40 MG: 40 INJECTION SUBCUTANEOUS at 05:36

## 2020-08-05 RX ADMIN — SENNOSIDES-DOCUSATE SODIUM TAB 8.6-50 MG 2 TABLET: 8.6-5 TAB at 17:12

## 2020-08-05 RX ADMIN — LORAZEPAM 1 MG: 2 INJECTION INTRAMUSCULAR; INTRAVENOUS at 10:48

## 2020-08-05 RX ADMIN — AMPICILLIN SODIUM AND SULBACTAM SODIUM 3 G: 2; 1 INJECTION, POWDER, FOR SOLUTION INTRAMUSCULAR; INTRAVENOUS at 00:00

## 2020-08-05 RX ADMIN — OMEPRAZOLE 20 MG: 20 CAPSULE, DELAYED RELEASE ORAL at 05:36

## 2020-08-05 RX ADMIN — OXYCODONE HYDROCHLORIDE 15 MG: 5 TABLET ORAL at 10:04

## 2020-08-05 RX ADMIN — SODIUM CHLORIDE: 9 INJECTION, SOLUTION INTRAVENOUS at 16:39

## 2020-08-05 RX ADMIN — SODIUM CHLORIDE: 9 INJECTION, SOLUTION INTRAVENOUS at 21:28

## 2020-08-05 RX ADMIN — AMPICILLIN SODIUM AND SULBACTAM SODIUM 3 G: 2; 1 INJECTION, POWDER, FOR SOLUTION INTRAMUSCULAR; INTRAVENOUS at 05:35

## 2020-08-05 RX ADMIN — OXYCODONE HYDROCHLORIDE 15 MG: 5 TABLET ORAL at 22:35

## 2020-08-05 RX ADMIN — MORPHINE SULFATE 4 MG: 4 INJECTION INTRAVENOUS at 10:48

## 2020-08-05 RX ADMIN — MORPHINE SULFATE 4 MG: 4 INJECTION INTRAVENOUS at 11:11

## 2020-08-05 RX ADMIN — MORPHINE SULFATE 2 MG: 4 INJECTION INTRAVENOUS at 23:56

## 2020-08-05 RX ADMIN — THERA TABS 1 TABLET: TAB at 05:35

## 2020-08-05 RX ADMIN — AMPICILLIN SODIUM AND SULBACTAM SODIUM 3 G: 2; 1 INJECTION, POWDER, FOR SOLUTION INTRAMUSCULAR; INTRAVENOUS at 11:19

## 2020-08-05 RX ADMIN — LIDOCAINE HYDROCHLORIDE 20 ML: 20 INJECTION, SOLUTION INFILTRATION; PERINEURAL at 10:47

## 2020-08-05 RX ADMIN — FLUCONAZOLE 400 MG: 200 TABLET ORAL at 05:35

## 2020-08-05 RX ADMIN — APIXABAN 5 MG: 5 TABLET, FILM COATED ORAL at 17:07

## 2020-08-05 RX ADMIN — AMPICILLIN SODIUM AND SULBACTAM SODIUM 3 G: 2; 1 INJECTION, POWDER, FOR SOLUTION INTRAMUSCULAR; INTRAVENOUS at 23:58

## 2020-08-05 RX ADMIN — OXYCODONE HYDROCHLORIDE 5 MG: 5 TABLET ORAL at 18:43

## 2020-08-05 RX ADMIN — AMPICILLIN SODIUM AND SULBACTAM SODIUM 3 G: 2; 1 INJECTION, POWDER, FOR SOLUTION INTRAMUSCULAR; INTRAVENOUS at 17:08

## 2020-08-05 RX ADMIN — SENNOSIDES-DOCUSATE SODIUM TAB 8.6-50 MG 2 TABLET: 8.6-5 TAB at 05:36

## 2020-08-05 RX ADMIN — OXYCODONE HYDROCHLORIDE 10 MG: 5 TABLET ORAL at 17:07

## 2020-08-05 ASSESSMENT — ENCOUNTER SYMPTOMS
RESPIRATORY NEGATIVE: 1
GASTROINTESTINAL NEGATIVE: 1
EYES NEGATIVE: 1
CARDIOVASCULAR NEGATIVE: 1
CHILLS: 0
WEAKNESS: 1
FEVER: 0
MYALGIAS: 1

## 2020-08-05 ASSESSMENT — CHA2DS2 SCORE
DIABETES: YES
VASCULAR DISEASE: YES
AGE 75 OR GREATER: NO
SEX: MALE
CHA2DS2 VASC SCORE: 5
CHF OR LEFT VENTRICULAR DYSFUNCTION: NO
PRIOR STROKE OR TIA OR THROMBOEMBOLISM: YES
HYPERTENSION: NO
AGE 65 TO 74: YES

## 2020-08-05 NOTE — CARE PLAN
Problem: Pain Management  Goal: Pain level will decrease to patient's comfort goal  Outcome: PROGRESSING AS EXPECTED  Note: Pt educated to call for pain medication when needed.      Problem: Safety  Goal: Will remain free from injury  Outcome: PROGRESSING AS EXPECTED  Note: Pt educated to call for assistance prior to ambulating.

## 2020-08-05 NOTE — PROGRESS NOTES
Received report from Yoana JORGENSEN. Assumed care. Pt resting comfortably in bed. Plan of care includes pain management, wound vac change, IV abx: Chart reviewed. Call light in place, fall precautions in place, patient educated on importance of calling for assistance. No additional needs at this time.

## 2020-08-05 NOTE — WOUND TEAM
[facility]  Inpatient Services  Wound and Skin Care Progress note    Admission Date: 7/25/2020     Last order of IP CONSULT TO WOUND CARE was found on 7/30/2020 from Hospital Encounter on 7/25/2020     HPI, PMH, SH: Reviewed    Unit where seen by Wound Team: 2215/01     WOUND CONSULT/FOLLOW-UP RELATED TO:  NPWT Change under sedation    Self Report / Pain Level:  Premedicated with morphine, ativan,  lidocaine soak to foam    OBJECTIVE:  drsg intact, pressure redistribution mattress    WOUND TYPE, LOCATION, CHARACTERISTICS (Pressure Injuries: location, stage, POA or date identified)    Negative Pressure Wound Therapy 07/27/20 Surgical (Active)   NPWT Pump Mode / Pressure Setting Ulta;125 mmHg    Dressing Type Medium;Black Foam (Veraflo)    Number of Foam Pieces Used 1    Canister Changed No    NEXT Dressing Change/Treatment Date 08/07/20    VAC VeraFlo Irrigant Normal Saline    VAC VeraFlo Soak Time (mins) 4    VAC VeraFlo Instill Volume (ml) 16    VAC VeraFlo - Therapy Time (hrs) 2.5    VAC VeraFlo Pressure (mm/Hg) 125 mmHg         Wound 07/25/20 Full Thickness Wound Scrotum Right (Active)       08/03/20 1430   Site Assessment Red;Pink;Drainage    Periwound Assessment Intact    Margins Defined edges    Closure Secondary intention    Drainage Amount Small    Drainage Description Serosanguineous;Yellow    Treatments Cleansed    Wound Cleansing Normal Saline Irrigation    Periwound Protectant Benzoin;Paste Ring    Dressing Cleansing/Solutions Not Applicable    Dressing Options Wound Vac    Dressing Changed Changed    Dressing Status Intact    Dressing Change/Treatment Frequency Monday, Wednesday, Friday, and As Needed    NEXT Dressing Change/Treatment Date 08/07/20    NEXT Weekly Photo (Inpatient Only) 08/10/20    Non-staged Wound Description Full thickness    Wound Length (cm) 13 cm 08/03/20 1430   Wound Width (cm) 8 cm 08/03/20 1430   Wound Depth (cm) 5 cm 08/03/20 1430   Wound Surface Area (cm^2) 104  cm^2 08/03/20 1430   Wound Volume (cm^3) 520 cm^3 08/03/20 1430   Wound Healing % -1067 08/03/20 1430   Wound Bed Granulation (%) 80 % 08/03/20 1430   Tunneling (cm) 12 cm 08/03/20 1430   Tunneling Clock Position of Wound 10 08/03/20 1430   Tunneling - 2nd Location (cm) 10 cm 08/03/20 1430   Tunneling Clock Position of Wound - 2nd Location 11 08/03/20 1430   Undermining (cm) 4.3 cm 07/29/20 1600   Undermining of Wound, 1st Location From 9 o'clock;To 10 o'clock 07/29/20 1600   Undermining (cm) - 2nd location 3.4 cm 07/27/20 1900   Undermining of Wound, 2nd Location From 2 o'clock;To 3 o'clock 07/27/20 1900   Shape irregular    Wound Odor None    Pulses N/A    Exposed Structures Connective tissue, R testicle        Wound 07/29/20 Pressure Injury Buttocks Left St 2 8/5 (Active)      08/05/20 1200   Site Assessment Pink;Red    Periwound Assessment Purple;Red;Satellite lesions    Margins Defined edges    Drainage Amount None    Treatments Cleansed    Wound Cleansing Foam Cleanser/Washcloth    Periwound Protectant Antifungal Therapy    Dressing Cleansing/Solutions Not Applicable    Dressing Options Open to Air    Dressing Changed Observed    Dressing Status Clean;Dry;Intact    Dressing Change/Treatment Frequency Every Shift, and As Needed    NEXT Dressing Change/Treatment Date 08/05/20    NEXT Weekly Photo (Inpatient Only) 08/12/20    Pressure Injury Stage 2    Wound Length (cm) 0.5 cm 08/05/20 1200   Wound Width (cm) 5 cm 08/05/20 1200   Wound Surface Area (cm^2) 2.5 cm^2 08/05/20 1200   Tunneling (cm) 0 cm 08/05/20 1200   Undermining (cm) 0 cm 08/05/20 1200   Shape linear    Wound Odor None    Pulses N/A    Exposed Structures None           Vascular:    JW:   No results found.      Lab Values:    Lab Results   Component Value Date/Time    WBC 8.2 08/05/2020 03:51 AM    RBC 4.86 08/05/2020 03:51 AM    HEMOGLOBIN 12.2 (L) 08/05/2020 03:51 AM    HEMATOCRIT 41.0 (L) 08/05/2020 03:51 AM    CREACTPROT 0.58 07/01/2020 07:03  AM    SEDRATEWES 15 07/01/2020 07:03 AM    HBA1C 8.6 (A) 01/07/2020 07:14 AM            Culture Results show:  Recent Results (from the past 720 hour(s))   CULTURE WOUND W/ GRAM STAIN    Collection Time: 07/31/20  6:45 PM    Specimen: Wound   Result Value Ref Range    Significant Indicator POS (POS)     Source WND     Site SCROTAL ABSCESS     Culture Result (A)      Growth noted after further incubation, see below for  organism identification.      Gram Stain Result       Many WBCs.  Rare Gram positive cocci.  Rare Yeast.      Culture Result Candida albicans  Rare growth   (A)          INTERVENTIONS BY WOUND TEAM: Soaked drsg with  NS, then lidocaine. Prepared drsg. Rremoved drsg. Cleaned wound and periwound with NS, dried. Benzoin and paste rings x 2 to periwound.  Adaptic around R testicle. Medium Veraflow drsg peeled apart to become long drsg, applied into wound bed and wrapped around testicle,  Sealed and resumed Veraflow NPWT with NS 16 ml for 4 min dwell every 2.5 H with 125 mmHg pressure. Pt then turned to L side so that buttocks/sacrococcygeal area able to be seen. Cleaned skin with was moist washcloth and foaming soap. Applied antifungal barrier cream. Pad changed and pt repositioned.   Interdisciplinary consultation: Patient, Bedside RN, RN Apprentice      EVALUATION: Pt's wound with red granulation tissue. Has tunnel in 2 areas into R inguinal area. Pt received Ativan, morphine x 2 doses and lidocaine was instilled into foam. RN Jayme to ask MD if additional lidocaine as gel or viscous solution to be applied to wound bed after foam removed. Pt had increased pain with drsg removal. Additional morphine was given. Adhesive remover spray assisted with removal of ME and drape. Pt's R inguinal area decreased erythema and slight decreases induration,     Goals: Steady decrease in wound area and depth weekly.    NURSING PLAN OF CARE ORDERS (X):     Dressing changes: Continue previous Dressing Maintenance orders: x        See new Dressing Maintenance orders:       Skin care: See Skin Care orders:        Rectal tube care: See Rectal Tube Care orders:      Other orders:           WOUND TEAM PLAN OF CARE:  Dressing changes by wound team:  x        Follow up 1-2 times weekly:               Follow up 3 times weekly:                NPWT change 3 times weekly:     Follow up as needed:       Other (explain):     Anticipated discharge plans:   LTACH:        SNF/Rehab:                  Home Care:           Outpatient Wound Center:            Self Care:           Other: will need VAC and drsg changes, may require facility placement  if pain doesn't improve @ bs change

## 2020-08-05 NOTE — PROGRESS NOTES
[facility] Medicine Daily Progress Note    Date of Service  8/5/2020    Chief Complaint  69 y.o. 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 Schulte is a 69 y.o. male with a PMhx of CAD s/p PCI, pAF on eliquis, HTN, SLE on imuran & prednisone, polycytemia on hydroxyurea,who presented 7/25/2020 due to  recurrent rash on legs and buttocks followed by dermatology, recurrent cysts on his back needing I&D, Type II DM not well controlled insulin dependent (A1c 8.6%), has had a rash on his inner thigh that he is following up with Dermatology. Required insulin drip on admission.  Patient admitted for further evaluation and treatment. On 7/25: I&D right hemiscrotum  (Dr Power), 7/29: Further debridement (Dr. Viera)  7/31: Further debridement with wound vac placement (Dr Rosales), 8/3: Wound vac changes under anesthesia (Dr Rosales).        Interval Problem Update  -Patient seen and examined today.  Patient wound dressing will be changed today with wound care team.  Pain medication in place prior to wound care.  Patient encouraged to work with PT/OT and utilize pain medication.  Patient has been declining therapy due to pain.  Patient given education in regards to pain usage, PT/OT, and deconditioned state.  Patient aware and POC.  -POC: Continue antibiotic therapy per ID recommendation; Unasyn and fluconazole for now -we will transition to Augmentin for 7-10-day course once preparing for discharge; control blood sugar; aggressive wound care and wound vac check; per urology, wound VAC changed today 8/5/2020; continue Andres until patient able to use urinal without disrupting wound vac seal; pain control; work with PT/OT; SNF referral placed, pending placement and acceptance.  -Lab work: Reviewed; expected  -VSS at this time  -Ordered: CBC and BMP for a.m.  -No significant changes from my previous ROS or PE, please see my previous note for further  details.    Consultants/Specialty  -Urology -Dr. Rosales  -Infectious disease -Dr. Clemente    Code Status  Full    Disposition  TBD    Review of Systems  Review of Systems   Constitutional: Positive for malaise/fatigue. Negative for chills and fever.   HENT: Negative.    Eyes: Negative.    Respiratory: Negative.    Cardiovascular: Negative.    Gastrointestinal: Negative.    Genitourinary: Positive for dysuria.   Musculoskeletal: Positive for myalgias.   Skin: Negative.    Neurological: Positive for weakness.        Physical Exam  Temp:  [36.9 °C (98.5 °F)-37.1 °C (98.8 °F)] 36.9 °C (98.5 °F)  Pulse:  [73-78] 73  Resp:  [18] 18  BP: (137-144)/(79-80) 137/80  SpO2:  [94 %-95 %] 94 %    Physical Exam  Vitals signs and nursing note reviewed.   Constitutional:       Appearance: He is ill-appearing.   HENT:      Head: Normocephalic.      Nose: Nose normal.      Mouth/Throat:      Mouth: Mucous membranes are moist.   Eyes:      Pupils: Pupils are equal, round, and reactive to light.   Neck:      Musculoskeletal: Neck supple.   Cardiovascular:      Rate and Rhythm: Normal rate and regular rhythm.      Pulses: Normal pulses.      Heart sounds: Normal heart sounds.   Pulmonary:      Effort: Pulmonary effort is normal.   Abdominal:      General: Bowel sounds are normal.   Genitourinary:     Penis: Normal.       Comments: Scrotal swelling; wound vac in place  Musculoskeletal: Normal range of motion.   Skin:     General: Skin is warm.      Capillary Refill: Capillary refill takes 2 to 3 seconds.   Neurological:      Mental Status: He is alert. Mental status is at baseline.         Fluids    Intake/Output Summary (Last 24 hours) at 8/5/2020 1224  Last data filed at 8/5/2020 1219  Gross per 24 hour   Intake 520 ml   Output 2850 ml   Net -2330 ml       Laboratory  Recent Labs     08/03/20  0420 08/04/20  0431 08/05/20  0351   WBC 11.2* 12.1* 8.2   RBC 4.66* 4.84 4.86   HEMOGLOBIN 11.6* 12.0* 12.2*   HEMATOCRIT 39.1* 41.0* 41.0*    MCV 83.9 84.7 84.4   MCH 24.9* 24.8* 25.1*   MCHC 29.7* 29.3* 29.8*   RDW 54.9* 57.1* 56.4*   PLATELETCT 711* 682* 663*   MPV 10.7 10.4 11.2     Recent Labs     08/03/20  0420 08/04/20  0431 08/05/20  0351   SODIUM 135 135 136   POTASSIUM 3.8 4.9 4.1   CHLORIDE 99 99 100   CO2 30 26 28   GLUCOSE 131* 164* 130*   BUN 6* 7* 5*   CREATININE 0.85 0.83 0.78   CALCIUM 8.1* 8.4 8.5     Recent Labs     08/03/20  0420   INR 1.22*               Imaging  IR-MIDLINE CATHETER INSERTION WO GUIDANCE > AGE 3   Final Result                  Ultrasound-guided midline placement performed by qualified nursing staff    as above.          DX-CHEST-PORTABLE (1 VIEW)   Final Result         1. No acute cardiopulmonary abnormalities are identified.      CT-ABDOMEN-PELVIS WITH   Final Result      1.  Diffuse scrotal edema, eccentric to the RIGHT with extensive soft tissue gas present particularly in the RIGHT groin and hemiscrotum, consistent with Landon's gangrene.   2.  No discrete drainable abscess demonstrated.   3.  Minimal retroperitoneal adenopathy, nonspecific.   4.  Nonobstructing LEFT kidney stone.   5.  Splenomegaly.   6.  Cystic lesion in the pancreatic tail measuring 19 mm.  Neoplasm is not excluded.  Consider further evaluation with MRI.      These findings were discussed with AYALA HAMPTON on 7/25/2020 3:41 PM.      DX-CHEST-PORTABLE (1 VIEW)   Final Result      1.  There is no acute cardiopulmonary process.           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 Bryn MYERS: Continue Unasyn and fluconazole for now, will need to transition to Augmentin for 7-10-day course once prepping for discharge; control blood sugar  -Wound VAC in place; placed on 8/3 with urology, Dr. Rosales; next wound dressing changed on 8/5  -Pain control  -Wound care  -Ordered PT/OT  -Referred SNF placement    CAD (coronary artery disease)- (present on admission)  Assessment & Plan  No acute  events      Type 2 diabetes mellitus, with long-term current use of insulin (HCC)- (present on admission)  Assessment & Plan  -Lantus 22 units- whs  -SSI      Paroxysmal A-fib (HCC)- (present on admission)  Assessment & Plan  -Hold eliquis secondary to interventions  -Rate controlled      Pancreatic cyst  Assessment & Plan  Follow up with imaging as outpatient      SLE (systemic lupus erythematosus related syndrome) (HCC)  Assessment & Plan  Prednisone on hold secondary to infection - watch for issues with adrenal insuffiencey         VTE prophylaxis: SCDs  ==============================================================================================================================  Please note that this dictation was created using voice recognition software. I have made every reasonable attempt to correct obvious errors, but there may be errors of grammar and possibly content that I did not discover before finalizing the note.    Electronically signed by:  RASHEL Mari, MSN, APRN, FNP-C  Hospitalist Services  [facility]  [phone]  [email or web address]  08/05/20    [id number]

## 2020-08-05 NOTE — PROGRESS NOTES
SPoke with pt's daughter, she is concerned about pt's risk of developing blood clots due to his hx of polycythemia vera and she is concerned about his elevated platelet count. She also stated that he is being closely followed by Dr. Sims, his oncologist for this problem. Dr. Cerna will come down to speak with patients family member regarding plan of care

## 2020-08-05 NOTE — PROGRESS NOTES
Pt agitated and concerned about upcoming dressing change.  Pt is unhappy with the current medication regimen available for the dressing change. Spoke with aHilee GOLDEN, new orders received to give 4mg of morphine and then another 4mg of morphine for dressing change if needed.

## 2020-08-06 LAB
ALBUMIN SERPL BCP-MCNC: 2.2 G/DL (ref 3.2–4.9)
ANION GAP SERPL CALC-SCNC: 7 MMOL/L (ref 7–16)
BUN SERPL-MCNC: 4 MG/DL (ref 8–22)
CALCIUM SERPL-MCNC: 8.3 MG/DL (ref 8.4–10.2)
CHLORIDE SERPL-SCNC: 103 MMOL/L (ref 96–112)
CO2 SERPL-SCNC: 29 MMOL/L (ref 20–33)
CREAT SERPL-MCNC: 0.9 MG/DL (ref 0.5–1.4)
ERYTHROCYTE [DISTWIDTH] IN BLOOD BY AUTOMATED COUNT: 56.9 FL (ref 35.9–50)
GLUCOSE BLD-MCNC: 101 MG/DL (ref 65–99)
GLUCOSE BLD-MCNC: 134 MG/DL (ref 65–99)
GLUCOSE BLD-MCNC: 142 MG/DL (ref 65–99)
GLUCOSE BLD-MCNC: 179 MG/DL (ref 65–99)
GLUCOSE SERPL-MCNC: 109 MG/DL (ref 65–99)
HCT VFR BLD AUTO: 39.4 % (ref 42–52)
HGB BLD-MCNC: 11.7 G/DL (ref 14–18)
MCH RBC QN AUTO: 25 PG (ref 27–33)
MCHC RBC AUTO-ENTMCNC: 29.7 G/DL (ref 33.7–35.3)
MCV RBC AUTO: 84.2 FL (ref 81.4–97.8)
PHOSPHATE SERPL-MCNC: 3.3 MG/DL (ref 2.5–4.5)
PLATELET # BLD AUTO: 639 K/UL (ref 164–446)
PMV BLD AUTO: 10.9 FL (ref 9–12.9)
POTASSIUM SERPL-SCNC: 3.7 MMOL/L (ref 3.6–5.5)
RBC # BLD AUTO: 4.68 M/UL (ref 4.7–6.1)
SODIUM SERPL-SCNC: 139 MMOL/L (ref 135–145)
WBC # BLD AUTO: 8.4 K/UL (ref 4.8–10.8)

## 2020-08-06 PROCEDURE — 700102 HCHG RX REV CODE 250 W/ 637 OVERRIDE(OP): Performed by: INTERNAL MEDICINE

## 2020-08-06 PROCEDURE — A9270 NON-COVERED ITEM OR SERVICE: HCPCS | Performed by: INTERNAL MEDICINE

## 2020-08-06 PROCEDURE — 700111 HCHG RX REV CODE 636 W/ 250 OVERRIDE (IP): Performed by: INTERNAL MEDICINE

## 2020-08-06 PROCEDURE — 700102 HCHG RX REV CODE 250 W/ 637 OVERRIDE(OP): Performed by: HOSPITALIST

## 2020-08-06 PROCEDURE — 770006 HCHG ROOM/CARE - MED/SURG/GYN SEMI*

## 2020-08-06 PROCEDURE — 700105 HCHG RX REV CODE 258: Performed by: HOSPITALIST

## 2020-08-06 PROCEDURE — A9270 NON-COVERED ITEM OR SERVICE: HCPCS | Performed by: HOSPITALIST

## 2020-08-06 PROCEDURE — 82962 GLUCOSE BLOOD TEST: CPT | Mod: 91

## 2020-08-06 PROCEDURE — 700111 HCHG RX REV CODE 636 W/ 250 OVERRIDE (IP): Performed by: NURSE PRACTITIONER

## 2020-08-06 PROCEDURE — 85027 COMPLETE CBC AUTOMATED: CPT

## 2020-08-06 PROCEDURE — 97166 OT EVAL MOD COMPLEX 45 MIN: CPT

## 2020-08-06 PROCEDURE — 97162 PT EVAL MOD COMPLEX 30 MIN: CPT

## 2020-08-06 PROCEDURE — 36415 COLL VENOUS BLD VENIPUNCTURE: CPT

## 2020-08-06 PROCEDURE — 99232 SBSQ HOSP IP/OBS MODERATE 35: CPT | Performed by: HOSPITALIST

## 2020-08-06 PROCEDURE — 80069 RENAL FUNCTION PANEL: CPT

## 2020-08-06 PROCEDURE — 99232 SBSQ HOSP IP/OBS MODERATE 35: CPT | Performed by: INTERNAL MEDICINE

## 2020-08-06 PROCEDURE — 700105 HCHG RX REV CODE 258: Performed by: INTERNAL MEDICINE

## 2020-08-06 RX ORDER — ALBUTEROL SULFATE 90 UG/1
2 AEROSOL, METERED RESPIRATORY (INHALATION)
Status: DISCONTINUED | OUTPATIENT
Start: 2020-08-06 | End: 2020-08-08

## 2020-08-06 RX ADMIN — AMPICILLIN SODIUM AND SULBACTAM SODIUM 3 G: 2; 1 INJECTION, POWDER, FOR SOLUTION INTRAMUSCULAR; INTRAVENOUS at 05:15

## 2020-08-06 RX ADMIN — AMPICILLIN SODIUM AND SULBACTAM SODIUM 3 G: 2; 1 INJECTION, POWDER, FOR SOLUTION INTRAMUSCULAR; INTRAVENOUS at 17:43

## 2020-08-06 RX ADMIN — APIXABAN 5 MG: 5 TABLET, FILM COATED ORAL at 08:52

## 2020-08-06 RX ADMIN — OXYCODONE HYDROCHLORIDE 10 MG: 5 TABLET ORAL at 17:58

## 2020-08-06 RX ADMIN — MORPHINE SULFATE 4 MG: 4 INJECTION INTRAVENOUS at 19:42

## 2020-08-06 RX ADMIN — APIXABAN 5 MG: 5 TABLET, FILM COATED ORAL at 17:42

## 2020-08-06 RX ADMIN — MORPHINE SULFATE 4 MG: 4 INJECTION INTRAVENOUS at 05:14

## 2020-08-06 RX ADMIN — OMEPRAZOLE 20 MG: 20 CAPSULE, DELAYED RELEASE ORAL at 08:52

## 2020-08-06 RX ADMIN — MORPHINE SULFATE 4 MG: 4 INJECTION INTRAVENOUS at 11:55

## 2020-08-06 RX ADMIN — THERA TABS 1 TABLET: TAB at 08:51

## 2020-08-06 RX ADMIN — SODIUM CHLORIDE: 9 INJECTION, SOLUTION INTRAVENOUS at 17:59

## 2020-08-06 RX ADMIN — SENNOSIDES-DOCUSATE SODIUM TAB 8.6-50 MG 2 TABLET: 8.6-5 TAB at 17:41

## 2020-08-06 RX ADMIN — AMPICILLIN SODIUM AND SULBACTAM SODIUM 3 G: 2; 1 INJECTION, POWDER, FOR SOLUTION INTRAMUSCULAR; INTRAVENOUS at 11:59

## 2020-08-06 RX ADMIN — OXYCODONE HYDROCHLORIDE 15 MG: 5 TABLET ORAL at 10:01

## 2020-08-06 RX ADMIN — OXYCODONE HYDROCHLORIDE 15 MG: 5 TABLET ORAL at 22:30

## 2020-08-06 RX ADMIN — SENNOSIDES-DOCUSATE SODIUM TAB 8.6-50 MG 2 TABLET: 8.6-5 TAB at 08:51

## 2020-08-06 RX ADMIN — FLUCONAZOLE 400 MG: 200 TABLET ORAL at 08:52

## 2020-08-06 ASSESSMENT — ENCOUNTER SYMPTOMS
FEVER: 0
RESPIRATORY NEGATIVE: 1
COUGH: 0
CHILLS: 0
VOMITING: 0
CONSTIPATION: 0
NAUSEA: 0
DIARRHEA: 0
CARDIOVASCULAR NEGATIVE: 1
EYES NEGATIVE: 1
SHORTNESS OF BREATH: 0
WEAKNESS: 1
MYALGIAS: 1
ABDOMINAL PAIN: 0
GASTROINTESTINAL NEGATIVE: 1

## 2020-08-06 ASSESSMENT — COGNITIVE AND FUNCTIONAL STATUS - GENERAL
MOVING FROM LYING ON BACK TO SITTING ON SIDE OF FLAT BED: A LITTLE
EATING MEALS: A LITTLE
STANDING UP FROM CHAIR USING ARMS: A LITTLE
CLIMB 3 TO 5 STEPS WITH RAILING: TOTAL
PERSONAL GROOMING: A LITTLE
DRESSING REGULAR LOWER BODY CLOTHING: TOTAL
SUGGESTED CMS G CODE MODIFIER DAILY ACTIVITY: CL
SUGGESTED CMS G CODE MODIFIER MOBILITY: CL
HELP NEEDED FOR BATHING: TOTAL
MOVING TO AND FROM BED TO CHAIR: A LOT
DAILY ACTIVITIY SCORE: 12
TOILETING: TOTAL
WALKING IN HOSPITAL ROOM: A LOT
MOBILITY SCORE: 14
TURNING FROM BACK TO SIDE WHILE IN FLAT BAD: A LITTLE
DRESSING REGULAR UPPER BODY CLOTHING: A LITTLE

## 2020-08-06 ASSESSMENT — GAIT ASSESSMENTS: GAIT LEVEL OF ASSIST: UNABLE TO PARTICIPATE

## 2020-08-06 ASSESSMENT — ACTIVITIES OF DAILY LIVING (ADL): TOILETING: UNABLE TO DETERMINE AT THIS TIME

## 2020-08-06 NOTE — PROGRESS NOTES
Received report from day shift. Assumed care of pt. Pt a&ox 4. VSS. Pt complains of 8/10 pain in right scrotum, requesting pain medication but declines morphine. Pt stated he was ok with waiting until the oxycodone was available. Wound vac in place at 125 mm hg with 350 mL serosanguineous drainage in the canister. No nausea reported. No further needs at this time. Bed locked and in lowest position. Call light within reach. Will continue to monitor.

## 2020-08-06 NOTE — DISCHARGE PLANNING
Anticipated Discharge Disposition: SNF    Action: Met with pt at bedside to discuss recommendation for SNF. Pt agreeable and consented to blanket referral. Pt will choose out of accepting facilities.     Barriers to Discharge: SNF acceptance    Plan: F/U with SNF referrals

## 2020-08-06 NOTE — PROGRESS NOTES
Received report from NOC RN. Assumed care of patient. Patient Is A&O x 4. VSS. Pt complains of some pain at this time in the groin area. Wound vac in place at 125 mm hg. No N/V or SOB reported at this time. Educated on POC, pt verbalized understanding. Call light within reach, bed is locked and lowest position. Will monitor hourly.

## 2020-08-06 NOTE — THERAPY
Occupational Therapy   Initial Evaluation     Patient Name: Francesco Schulte  Age:  69 y.o., Sex:  male  Medical Record #: [id number]  Today's Date: 8/6/2020     Precautions  Precautions: Fall Risk  Comments: wound vac to scrotum     Assessment  Patient is 69 y.o. male with a diagnosis of gangrene to scrotum, s/p multiple I&D, now with wound vac.  Pt has been challenging to get to participate in therapy eval, was agreeable to mobilize today so PLOF information not obtained in detail so that pt would not change mind and refuse therapy.  Pt has spouse at home, unclear if she works or not.  Pt required modA x2 people for supine to sit (partially due to management of lines and pad and u-cushion under him to protect scrotum) and pt also heavily reliant on bedrails.  Sit to stand Amina with height of bed elevated.  Pt was able to sit briefly on commode but could not tolerate, and able to stand from elevated BSC with Amina.  Back to bed with modA.  Pt is impulsive, strongly directing his care and mobility and can be self limiting at times.  At this time, pt significantly limited in mobility and ADl's by scrotal wound, will not be safe to d/c home or be managed as an outpt.  Will need further inpt post acute therapy and wound care.  OT will follow while in house.      Recommend Occupational Therapy 3 times per week until therapy goals are met for the following treatments:  Adaptive Equipment, Neuro Re-Education / Balance, Self Care/Activities of Daily Living, Therapeutic Activities and Therapeutic Exercises.    DC Equipment Recommendations: Unable to determine at this time  Discharge Recommendations: Recommend post-acute placement for additional occupational therapy services prior to discharge home        08/06/20 1114   Prior Living Situation   Prior Services Unable To Determine At This Time   Housing / Facility [facility]   Equipment Owned Unable to Determine At This Time   Comments PLOF infor to be obtained.  Pt was  "willing to participate with therapy, did not want to risk him changing his mind with PLOF info interview   Prior Level of ADL Function   Self Feeding Independent   Grooming / Hygiene Unable To Determine At This Time   Bathing Unable To Determine At This Time   Dressing Unable To Determine At This Time   Toileting Unable To Determine At This Time   Prior Level of IADL Function   Medication Management Unable To Determine At This Time   Laundry Unable To Determine At This Time   Kitchen Mobility Unable To Determine At This Time   Finances Unable To Determine At This Time   Home Management Unable To Determine At This Time   Shopping Unable To Determine At This Time   Prior Level Of Mobility Unable to Determine At This Time   Driving / Transportation Unable To Determine At This Time   Occupation (Pre-Hospital Vocational) Unable To Determine At This Time   Leisure Interests Unable To Determine At This Time   Comments TBD on next visit   Balance Assessment   Sitting Balance (Static) Fair   Sitting Balance (Dynamic) Fair -   Standing Balance (Static) Fair -   Standing Balance (Dynamic) Poor +   Weight Shift Sitting Fair   Weight Shift Standing Fair   Comments difficulty straightening R hip- feels tight, like it will \"rip\" if stretched   Bed Mobility    Supine to Sit Moderate Assist  (x2 just to help manage lines and keep pad/u-cushion in place)   Sit to Supine Moderate Assist   Scooting Moderate Assist   ADL Assessment   Eating Independent   Lower Body Dressing Maximal Assist   Toileting Total Assist  (attempted BSC, can't tolerate- needs bedpan, catheter)   Functional Mobility   Sit to Stand Minimal Assist  (with beid height elevated)   Bed, Chair, Wheelchair Transfer Minimal Assist   Transfer Method Stand Step  (pivot to commode with FWW)   Mobility Standing, marching in place, side steps and pivot to commode with FWW and Amina for safety   Short Term Goals   Short Term Goal # 1 Pt will get up to EOB supervised in prep for " transfers in 4 visits   Short Term Goal # 2 Pt will transfer to BSC supervised with FWW in 4 visits   Short Term Goal # 3 Pt will stand 10 min at sink to groom with Amina in 5 visits   Short Term Goal # 4 Pt will complete LB dressing (socks at least) with Amina and AE in 5 visits

## 2020-08-06 NOTE — CARE PLAN
Problem: Pain Management  Goal: Pain level will decrease to patient's comfort goal  Outcome: PROGRESSING AS EXPECTED     Problem: Fluid Volume:  Goal: Will maintain balanced intake and output  Outcome: PROGRESSING AS EXPECTED     Problem: Safety  Goal: Will remain free from falls  Outcome: PROGRESSING AS EXPECTED     Problem: Knowledge Deficit  Goal: Knowledge of the prescribed therapeutic regimen will improve  Outcome: PROGRESSING AS EXPECTED     Problem: Urinary Elimination:  Goal: Ability to reestablish a normal urinary elimination pattern will improve  Outcome: PROGRESSING AS EXPECTED     Problem: Skin Integrity  Goal: Risk for impaired skin integrity will decrease  Outcome: PROGRESSING AS EXPECTED     Problem: Respiratory:  Goal: Respiratory status will improve  Outcome: PROGRESSING AS EXPECTED

## 2020-08-06 NOTE — THERAPY
Physical Therapy   Initial Evaluation     Patient Name: Francesco Schulte  Age:  69 y.o., Sex:  male  Medical Record #: [id number]  Today's Date: 8/6/2020     Precautions: (P) Fall Risk    Assessment  Patient is 69 y.o. male who was admitted for medical management of kira's gangrene of scrotum. Pt has has had multiple I/D and washouts since admission along with wound vac placement. Pt was agreeable for therapy evaluation today. Pt is very particular in mobility and prefers to mobilize with his instructions. Pt prefers to have neck pillow under bottom to offload scrotum prior to getting to EOB. Pt able to bridge while supine and have assist from therapist for placement of pillow. During EOB mobility therapist assists with green familia underneath patient to guide neck pillow while patient attempts to slide EOB in extended position. Pt able to demo fair balance while sitting EOB while pillow under bottom. Pt able to stand with Min A with bed elevated with significant reliance on FWW. Pt able to take side steps and participate in transfer to BSC. Pt requested neck pillow be placed under bottom while using BSC, however, pt educated that this will not be sufficient for passing a bowel movement, however, pt wanted to attempt. Pt uncomfortable while in BSC and preferred to use bed pain. Pt assisted back to bed with Mod A. Pt is primarily limited by pain and poor safety awareness during functional mobility. Pt will benefit from skilled PT while in house, with recommendation for post acute therapy prior to d/c home.      Plan    Recommend Physical Therapy 3 times per week until therapy goals are met for the following treatments:  Bed Mobility, Community Re-integration, Equipment, Gait Training, Manual Therapy, Neuro Re-Education / Balance, Self Care/Home Evaluation, Sensory Integration Techniques, Stair Training, Therapeutic Activities and Therapeutic Exercises    DC Equipment Recommendations: (P) Unable to determine at  this time  Discharge Recommendations: (P) Recommend post-acute placement for additional physical therapy services prior to discharge home      Objective       08/06/20 1115   Prior Living Situation   Prior Services Unable To Determine At This Time   Housing / Facility Unable To Determine At This Time   Comments will assess next therapy session    Prior Level of Functional Mobility   Bed Mobility Unable To Determine At This Time   Transfer Status Unable To Determine At This Time   Comments same as above   Gait Analysis   Gait Level Of Assist Unable to Participate   Comments limited due to pain ; able to demo functional steps for transfers    Bed Mobility    Supine to Sit Moderate Assist  (pt prefers using neck pillow under bottoms to offload scrotu)   Sit to Supine Moderate Assist   Scooting Moderate Assist   Rolling Minimal Assist to Rt.;Minimum Assist to Lt.   Skilled Intervention Verbal Cuing   Functional Mobility   Sit to Stand Minimal Assist  (with bed elevated)   Bed, Chair, Wheelchair Transfer Minimal Assist   Comments cues for handplacement    Patient / Family Goals    Patient / Family Goal #1 to go back to PLOF   Short Term Goals    Short Term Goal # 1 pt will go supine<>sit w/hob flat and rails down w/spv in 6tx for safe d/c home   Short Term Goal # 2 pt will go sit<>stand w/fww w/spv in 6tx for safe d/c home   Short Term Goal # 3 pt will transfer bed<>chair w/fww w/spv in 6tx for safe d/c home    Short Term Goal # 4 pt will ambulate for 150ft w/fww w/spv in 6tx for safe d/c home

## 2020-08-06 NOTE — PROGRESS NOTES
Vac alarming, Leak detected. Dressing around penis noted to be dislodged. Area redressed with tincture and drape. Dressing compressed for a minute however leak detected again. Cannister removed and replaced. All clamps open. Vac restarted. Still a leak. Area under scrotum compressed wit finger and seal achieved. This area resealed with success. Wound team notified and they will be by this afternoon.

## 2020-08-06 NOTE — DISCHARGE PLANNING
Received Choice form at 1055  Agency/Facility Name: [facility] referral for [place] area   Referral sent per Choice form at 1100.      @1105  Agency/Facility Name: [facility]   Spoke To: Shu   Outcome: Per Shu pt is accepted, but [facility] can not accept a [facility] Wound Vac if one is in place.       @1125  Agency/Facility Name: [facility]   Spoke To: Luis Enrique   Outcome: Per Luis Enrique pt will need PT/OT notes showing participation prior to acceptance.        @1450  Agency/Facility Name: [facility]   Spoke To: Chikis ( liaison )   Outcome: Pt is accepted by both facilities. Chikis states pt will need to be off iv pain management 48 hours prior to admission. [facility] has beds available this week.

## 2020-08-06 NOTE — PROGRESS NOTES
"Hospital Medicine Daily Progress Note    Date of Service  8/6/2020    Chief Complaint  69 y.o. 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 Schulte is a 69 y.o. male with a PMhx of CAD s/p PCI, pAF on eliquis, HTN, SLE on imuran & prednisone, polycytemia on hydroxyurea,who presented 7/25/2020 due to  recurrent rash on legs and buttocks followed by dermatology, recurrent cysts on his back needing I&D, Type II DM not well controlled insulin dependent (A1c 8.6%), has had a rash on his inner thigh that he is following up with Dermatology. Required insulin drip on admission.  Patient admitted for further evaluation and treatment. On 7/25: I&D right hemiscrotum  (Dr Power), 7/29: Further debridement (Dr. Viera)  7/31: Further debridement with wound vac placement (Dr Rosales), 8/3: Wound vac changes under anesthesia (Dr Rosales).        Interval Problem Update  -Patient seen and examined today.  Patient reports \"feeling ok\". Encouraged patient again to work with PT/OT. Patient aware in POC  -Dr. Cerna spoke to daughter at bedside yesterday and spent sometime to update family; plan in place regarding family concerns.  -POC: Continue antibiotic therapy per ID recommendation - see Dr. Clemente's note; pain control; wound care; PT/OT; pending SNF placement and accepatance.    -Lab work: Reviewed; expected  -VSS at this time  -Ordered: CBC and CMP - need to check LFT d/t abx therapy  -No significant changes from my previous ROS or PE, please see my previous note for further details.    Consultants/Specialty  -Urology -Dr. Rosales  -Infectious disease -Dr. Clemente    Code Status  Full    Disposition  TBD    Review of Systems  Review of Systems   Constitutional: Positive for malaise/fatigue. Negative for chills and fever.   HENT: Negative.    Eyes: Negative.    Respiratory: Negative.    Cardiovascular: Negative.    Gastrointestinal: Negative.    Genitourinary: Positive for dysuria. "   Musculoskeletal: Positive for myalgias.   Skin: Negative.    Neurological: Positive for weakness.        Physical Exam  Temp:  [36.7 °C (98 °F)-37.1 °C (98.7 °F)] 36.7 °C (98 °F)  Pulse:  [72-80] 73  Resp:  [18-20] 20  BP: (119-142)/(70-87) 142/70  SpO2:  [94 %-99 %] 97 %    Physical Exam  Vitals signs and nursing note reviewed.   Constitutional:       Appearance: He is ill-appearing.   HENT:      Head: Normocephalic.      Nose: Nose normal.      Mouth/Throat:      Mouth: Mucous membranes are moist.   Eyes:      Pupils: Pupils are equal, round, and reactive to light.   Neck:      Musculoskeletal: Neck supple.   Cardiovascular:      Rate and Rhythm: Normal rate and regular rhythm.      Pulses: Normal pulses.      Heart sounds: Normal heart sounds.   Pulmonary:      Effort: Pulmonary effort is normal.   Abdominal:      General: Bowel sounds are normal.   Genitourinary:     Penis: Normal.       Comments: Scrotal swelling; wound vac in place  Musculoskeletal: Normal range of motion.   Skin:     General: Skin is warm.      Capillary Refill: Capillary refill takes 2 to 3 seconds.   Neurological:      Mental Status: He is alert. Mental status is at baseline.         Fluids    Intake/Output Summary (Last 24 hours) at 8/6/2020 1124  Last data filed at 8/6/2020 0525  Gross per 24 hour   Intake 856.55 ml   Output 2825 ml   Net -1968.45 ml       Laboratory  Recent Labs     08/04/20  0431 08/05/20  0351 08/06/20  0818   WBC 12.1* 8.2 8.4   RBC 4.84 4.86 4.68*   HEMOGLOBIN 12.0* 12.2* 11.7*   HEMATOCRIT 41.0* 41.0* 39.4*   MCV 84.7 84.4 84.2   MCH 24.8* 25.1* 25.0*   MCHC 29.3* 29.8* 29.7*   RDW 57.1* 56.4* 56.9*   PLATELETCT 682* 663* 639*   MPV 10.4 11.2 10.9     Recent Labs     08/04/20  0431 08/05/20  0351 08/06/20  0818   SODIUM 135 136 139   POTASSIUM 4.9 4.1 3.7   CHLORIDE 99 100 103   CO2 26 28 29   GLUCOSE 164* 130* 109*   BUN 7* 5* 4*   CREATININE 0.83 0.78 0.90   CALCIUM 8.4 8.5 8.3*                    Imaging  IR-MIDLINE CATHETER INSERTION WO GUIDANCE > AGE 3   Final Result                  Ultrasound-guided midline placement performed by qualified nursing staff    as above.          DX-CHEST-PORTABLE (1 VIEW)   Final Result         1. No acute cardiopulmonary abnormalities are identified.      CT-ABDOMEN-PELVIS WITH   Final Result      1.  Diffuse scrotal edema, eccentric to the RIGHT with extensive soft tissue gas present particularly in the RIGHT groin and hemiscrotum, consistent with Landon's gangrene.   2.  No discrete drainable abscess demonstrated.   3.  Minimal retroperitoneal adenopathy, nonspecific.   4.  Nonobstructing LEFT kidney stone.   5.  Splenomegaly.   6.  Cystic lesion in the pancreatic tail measuring 19 mm.  Neoplasm is not excluded.  Consider further evaluation with MRI.      These findings were discussed with AYALA HAMPTON on 7/25/2020 3:41 PM.      DX-CHEST-PORTABLE (1 VIEW)   Final Result      1.  There is no acute cardiopulmonary process.           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 Clemente: Continue Unasyn and fluconazole for now, will need to transition to Augmentin for 7-10-day course once prepping for discharge; control blood sugar  -Wound VAC in place; placed on 8/3 with urology, Dr. Rosales;wound dressing changed on 8/5  -Pain control  -Wound care  -Ordered PT/OT  -Referred SNF placement - pending placement and acceptance    CAD (coronary artery disease)- (present on admission)  Assessment & Plan  No acute events      Type 2 diabetes mellitus, with long-term current use of insulin (HCC)- (present on admission)  Assessment & Plan  -Lantus 22 units- whs  -SSI      Paroxysmal A-fib (HCC)- (present on admission)  Assessment & Plan  -Hold eliquis secondary to interventions  -Rate controlled      Pancreatic cyst  Assessment & Plan  Follow up with imaging as outpatient      SLE (systemic lupus erythematosus  related syndrome) (HCC)  Assessment & Plan  Prednisone on hold secondary to infection - watch for issues with adrenal insuffiencey         VTE prophylaxis: SCDs  ==============================================================================================================================  Please note that this dictation was created using voice recognition software. I have made every reasonable attempt to correct obvious errors, but there may be errors of grammar and possibly content that I did not discover before finalizing the note.    Electronically signed by:  RASHEL Mari, MSN, APRN, FNP-C  Hospitalist Services  [facility]  [phone]  [email or web address]  08/06/20   112

## 2020-08-06 NOTE — PROGRESS NOTES
Infectious Disease Progress Note    Author: Dipika Clemente M.D. Date & Time of service: 2020  7:54 AM    Chief Complaint:  Landon's gangrene     Interval History:  AF, O2 2 L NC- new, pain with movement and BMs.  He is continued on unasyn and fluconazole.     Review of Systems:  Review of Systems   Constitutional: Negative for chills and fever.   Respiratory: Negative for cough and shortness of breath.    Gastrointestinal: Negative for abdominal pain, constipation, diarrhea, nausea and vomiting.   Musculoskeletal: Positive for myalgias.       Hemodynamics:  Temp (24hrs), Av °C (98.6 °F), Min:36.9 °C (98.4 °F), Max:37.1 °C (98.7 °F)  Temperature: 36.9 °C (98.4 °F)  Pulse  Av.9  Min: 64  Max: 110   Blood Pressure : 119/76       Physical Exam:  Physical Exam  Constitutional:       Appearance: Normal appearance.   Cardiovascular:      Rate and Rhythm: Regular rhythm. Tachycardia present.   Pulmonary:      Effort: Pulmonary effort is normal.      Breath sounds: Normal breath sounds.   Abdominal:      General: Abdomen is flat. There is no distension.      Palpations: Abdomen is soft.      Tenderness: There is abdominal tenderness. There is no guarding.   Genitourinary:     Comments: Scrotum with erythema and edema, wound VAC in place.  Right inguinal area with some induration, erythema and tenderness  Musculoskeletal:      Right lower leg: No edema.      Left lower leg: No edema.   Skin:     General: Skin is warm and dry.   Neurological:      General: No focal deficit present.      Mental Status: He is alert and oriented to person, place, and time.   Psychiatric:         Mood and Affect: Mood normal.         Behavior: Behavior normal.         Meds:    Current Facility-Administered Medications:   •  NS  •  apixaban  •  fluconazole  •  lidocaine  •  HYDROmorphone  •  LORazepam  •  ampicillin-sulbactam (UNASYN) IV  •  MBX  •  benzocaine-menthol  •  morphine injection  •  insulin glargine **AND** insulin  lispro **AND** insulin lispro **AND** POC Blood Glucose **AND** NOTIFY MD and PharmD **AND** glucose **AND** dextrose 50%  •  oxyCODONE immediate release  •  multivitamin  •  hyoscyamine-maalox plus-lidocaine viscous  •  omeprazole  •  senna-docusate **AND** polyethylene glycol/lytes **AND** magnesium hydroxide **AND** bisacodyl  •  ondansetron  •  ondansetron    Labs:  Recent Labs     08/04/20 0431 08/05/20  0351   WBC 12.1* 8.2   RBC 4.84 4.86   HEMOGLOBIN 12.0* 12.2*   HEMATOCRIT 41.0* 41.0*   MCV 84.7 84.4   MCH 24.8* 25.1*   RDW 57.1* 56.4*   PLATELETCT 682* 663*   MPV 10.4 11.2     Recent Labs     08/04/20 0431 08/05/20  0351   SODIUM 135 136   POTASSIUM 4.9 4.1   CHLORIDE 99 100   CO2 26 28   GLUCOSE 164* 130*   BUN 7* 5*     Recent Labs     08/04/20 0431 08/05/20  0351   ALBUMIN  --  2.2*   CREATININE 0.83 0.78       Imaging:  Ct-abdomen-pelvis With    Result Date: 7/25/2020 7/25/2020 3:04 PM HISTORY/REASON FOR EXAM:  Hernia, complicated; ? founiers gangrene. TECHNIQUE/EXAM DESCRIPTION:   CT scan of the abdomen and pelvis with contrast. Contrast-enhanced helical scanning was obtained from the diaphragmatic domes through the pubic symphysis following the bolus administration of nonionic contrast without complication. 100 mL of Omnipaque 350 nonionic contrast was administered without complication. Low dose optimization technique was utilized for this CT exam including automated exposure control and adjustment of the mA and/or kV according to patient size. COMPARISON: No prior studies available. FINDINGS: CT Abdomen: Visualized lung bases show mild dependent atelectasis. Small pericardial effusion. The liver is unremarkable. Spleen is enlarged measuring 16 cm. Adrenal glands are unremarkable. Bilateral kidney cysts. LEFT kidney shows nonobstructing stone measuring 5 mm. Cystic lesion in the pancreatic tail measuring 19 mm. The gallbladder is unremarkable. Minimally prominent retroperitoneal lymph nodes. CT  Pelvis: Bladder is unremarkable. Mildly enlarged prostate. Appendix is not visualized.  No pericecal inflammatory changes. Extensive inflammatory changes in the scrotum, eccentric to the RIGHT with large amount of soft tissue gas present in the RIGHT hemiscrotum extending into the perineum.     1.  Diffuse scrotal edema, eccentric to the RIGHT with extensive soft tissue gas present particularly in the RIGHT groin and hemiscrotum, consistent with Landon's gangrene. 2.  No discrete drainable abscess demonstrated. 3.  Minimal retroperitoneal adenopathy, nonspecific. 4.  Nonobstructing LEFT kidney stone. 5.  Splenomegaly. 6.  Cystic lesion in the pancreatic tail measuring 19 mm.  Neoplasm is not excluded.  Consider further evaluation with MRI. These findings were discussed with AYALA HAMPTON on 7/25/2020 3:41 PM.    Dx-chest-portable (1 View)    Result Date: 7/29/2020 7/29/2020 11:07 AM HISTORY/REASON FOR EXAM:  Fever TECHNIQUE/EXAM DESCRIPTION AND NUMBER OF VIEWS: Single portable view of the chest. COMPARISON: None FINDINGS: No pulmonary infiltrates or consolidations are noted. No pleural effusion. No pneumothorax. Normal cardiopericardial silhouette.     1. No acute cardiopulmonary abnormalities are identified.    Dx-chest-portable (1 View)    Result Date: 7/25/2020 7/25/2020 2:28 PM HISTORY/REASON FOR EXAM:  Sepsis. Prior testicular cyst drainage procedure. Testicular pain. TECHNIQUE/EXAM DESCRIPTION AND NUMBER OF VIEWS: Single portable view of the chest. COMPARISON: 7/1/2020 FINDINGS: The mediastinal and cardiac silhouette is unremarkable. There is atherosclerosis of the aorta. The lung parenchyma is clear. There is no significant pleural effusion. There is no visible pneumothorax. There are no acute bony abnormalities.     1.  There is no acute cardiopulmonary process.    Ir-midline Catheter Insertion Wo Guidance > Age 3    Result Date: 8/2/2020  HISTORY/REASON FOR EXAM:  Midline Placement   TECHNIQUE/EXAM  DESCRIPTION AND NUMBER OF VIEWS: Midline insertion with ultrasound guidance.  FINDINGS: Midline insertion with Ultrasound Guidance was performed by qualified nursing staff without the assistance of a Radiologist. Midline positioning as measured by RN or as appropriate length of catheter selected.              Ultrasound-guided midline placement performed by qualified nursing staff as above.       Micro:  Results     Procedure Component Value Units Date/Time    Fungal Culture [[id number]]  (Abnormal) Collected: 07/31/20 1845    Order Status: Completed Specimen: Wound Updated: 08/05/20 0846     Significant Indicator POS     Source WND     Site SCROTAL ABSCESS     Culture Result Growth noted after further incubation, see below for  organism identification.        Candida albicans  Rare growth      CULTURE WOUND W/ GRAM STAIN [[id number]]  (Abnormal) Collected: 07/31/20 1845    Order Status: Completed Specimen: Wound Updated: 08/05/20 0846     Significant Indicator POS     Source WND     Site SCROTAL ABSCESS     Culture Result Growth noted after further incubation, see below for  organism identification.       Gram Stain Result Many WBCs.  Rare Gram positive cocci.  Rare Yeast.       Culture Result Candida albicans  Rare growth      GRAM STAIN [[id number]] Collected: 07/31/20 1845    Order Status: Completed Specimen: Wound Updated: 08/05/20 0846     Significant Indicator .     Source WND     Site SCROTAL ABSCESS     Gram Stain Result Many WBCs.  Rare Gram positive cocci.  Rare Yeast.      BLOOD CULTURE [[id number]] Collected: 07/28/20 1026    Order Status: Completed Specimen: Blood from Peripheral Updated: 08/02/20 1217     Significant Indicator NEG     Source BLD     Site PERIPHERAL     Culture Result No growth after 5 days of incubation.  Blood culture testing and Gram stain, if indicated, are  performed at [facility], [address] [place] [address].  Positive blood cultures are  sent to  "[facility], [address] [place] [address], for organism identification and  susceptibility testing.      Narrative:      Per Hospital Policy: Only change Specimen Src: to \"Line\" if  specified by physician order.  Right AC    BLOOD CULTURE [[id number]] Collected: 07/28/20 1013    Order Status: Completed Specimen: Blood from Peripheral Updated: 08/02/20 1217     Significant Indicator NEG     Source BLD     Site PERIPHERAL     Culture Result No growth after 5 days of incubation.  Blood culture testing and Gram stain, if indicated, are  performed at [facility], [address] [place] [address].  Positive blood cultures are  sent to [facility], [address] [place] [address], for organism identification and  susceptibility testing.      Narrative:      Per Hospital Policy: Only change Specimen Src: to \"Line\" if  specified by physician order.  Right Hand    Fungal Culture [[id number]] Collected: 07/25/20 2030    Order Status: Completed Specimen: Wound Updated: 08/01/20 1847     Significant Indicator NEG     Source WND     Site Scrotal Abscess     Culture Result No fungal growth to date.    Narrative:      1 - Scrotal abscess  Surgery - swabs received    Anaerobic Culture [[id number]]  (Abnormal) Collected: 07/25/20 2030    Order Status: Completed Specimen: Wound Updated: 08/01/20 1847     Significant Indicator POS     Source WND     Site Scrotal Abscess     Culture Result Growth noted after further incubation, see below for  organism identification.        Prevotella disiens  Moderate growth      Narrative:      1 - Scrotal abscess  Surgery - swabs received    CULTURE WOUND W/ GRAM STAIN [[id number]]  (Abnormal)  (Susceptibility) Collected: 07/25/20 2030    Order Status: Completed Specimen: Wound Updated: 08/01/20 1847     Significant Indicator POS     Source WND     Site Scrotal Abscess     Culture Result -     Gram Stain Result Many Gram positive " "cocci.  Moderate Gram positive rods.       Culture Result Viridans Streptococcus  Heavy growth        Diphtheroids  Moderate growth  not J-K      Narrative:      1 - Scrotal abscess  Surgery - swabs received    Susceptibility     Viridans streptococcus (1)     Antibiotic Interpretation Microscan Method Status    Penicillin Sensitive 0.094 mcg/mL E-TEST Final    Cefotaxime Sensitive 0.38 mcg/mL E-TEST Final                   AFB Culture [[id number]] Collected: 07/31/20 1845    Order Status: Canceled Specimen: Other     FLUID CULTURE W/GRAM STAIN [[id number]] Collected: 07/31/20 1845    Order Status: Canceled Specimen: Other     URINE CULTURE(NEW) [[id number]]  (Abnormal) Collected: 07/28/20 1906    Order Status: Completed Specimen: Urine, Hernandez Cath Updated: 07/31/20 0800     Significant Indicator POS     Source UR     Site URINE, HERNANDEZ CATH     Culture Result -      Candida albicans  10-50,000 cfu/mL      Narrative:      Collected By:EDU PINZON  Indication for culture:->Evaluation for sepsis without a  clear source of infection  Collected By:EDU PINZON    BLOOD CULTURE [[id number]] Collected: 07/25/20 1400    Order Status: Completed Specimen: Blood from Peripheral Updated: 07/30/20 1617     Significant Indicator NEG     Source BLD     Site PERIPHERAL     Culture Result No growth after 5 days of incubation.  Blood culture testing and Gram stain, if indicated, are  performed at [facility], [address] [place] [address].  Positive blood cultures are  sent to [facility], [address] [place] [address], for organism identification and  susceptibility testing.      Narrative:      Per Hospital Policy: Only change Specimen Src: to \"Line\" if  specified by physician order.  Left Forearm/Arm    BLOOD CULTURE [[id number]] Collected: 07/25/20 1400    Order Status: Completed Specimen: Blood from Peripheral Updated: 07/30/20 1617     Significant Indicator " "NEG     Source BLD     Site PERIPHERAL     Culture Result No growth after 5 days of incubation.  Blood culture testing and Gram stain, if indicated, are  performed at [facility], [address] [place] [address].  Positive blood cultures are  sent to [facility], [address] [place] [address], for organism identification and  susceptibility testing.      Narrative:      Per Hospital Policy: Only change Specimen Src: to \"Line\" if  specified by physician order.  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]   • Stenosis of right carotid artery-Right CEA 3/21/18 [I65.21]   • Coagulopathy ([facility]) [D68.9]   • STEMI (ST elevation myocardial infarction) ([facility]) [I21.3]   • Type 2 diabetes mellitus, with long-term current use of insulin ([facility]) [E11.9, Z79.4]   • CAD (coronary artery disease) [I25.10]   • Paroxysmal A-fib ([facility]) [I48.0]   • SLE (systemic lupus erythematosus related syndrome) ([facility]) [M32.9]   • Pancreatic cyst [K86.2]     Interval 24 hours:      AF, O2 3 L NC   Labs reviewed  Micro reviewed    Patient improving but still with significant pain limiting movement and during wound VAC change.  Reviewed photos from last wound VAC change in healthy appearing granulation tissue but large wound with deep tunneling.  Per wound care notes tunneling is 10/11 cm deep in the inguinal area with some improvement.  Patient continued on antibiotics as below.    Assessment:      A 69-year-old diabetic male with lupus, polycythemia vera, ischemic cardiomyopathy, atrial fibrillation on chronic anticoagulation,   admitted with Ladnon's gangrene.  Requiring multiple debridements, on 07/25, 07/29, 07/31 & 8/3.   Per op note on 8/3 there was excellent granulation tissue throughout the wound and no necrotic tissue requiring debridement.  The wound VAC was replaced.  Patient has been on clindamycin Zosyn vancomycin and then " transition to Unasyn on 7/28 with the addition of fluconazole on 8/2 due to growth of Candida albicans from the wound.     Plan:     Landon's gangrene   -Wound cultures from 7/28 with Candida albicans, wound culture from 7/25 with Prevotella, strep agalactiae, Virdens Streptococcus and diphtheroids  Leukocytosis, improved   Thrombocytosis, likely reactionary-ongoing, some improvement  SLE  Immunosuppressed   Polycythemia vera  Ischemic cardiomyopathy  A. fib on anticoagulation  Diabetes      --- Continue unasyn and fluconazole-2-week antibiotic course post last surgery (end 8/17/20) patient is discharged home can transition Unasyn to Augmentin 875/125 twice daily  --- Noted diptheroid growth but will not specifically target as likely commensal and pt is improving on current regimen   --- Monitor labs-evaluate LFTs every few days as patient is on fluconazole  --- Limit immunosuppression as much as possible going forward, particularly for the next few months while recovering from infection   --- Monitor and control blood sugars to aid in healing and prevention of infection   --- Aggressive wound care and wound vac per surgery       Discussed with internal medicine, Dr. Cerna. ID will sign off.  Please reconsult if new concerns.

## 2020-08-07 PROBLEM — R53.81 DEBILITY: Status: ACTIVE | Noted: 2020-08-07

## 2020-08-07 LAB
ALBUMIN SERPL BCP-MCNC: 2.2 G/DL (ref 3.2–4.9)
ALBUMIN/GLOB SERPL: 0.5 G/DL
ALP SERPL-CCNC: 69 U/L (ref 30–99)
ALT SERPL-CCNC: 8 U/L (ref 2–50)
ANION GAP SERPL CALC-SCNC: 7 MMOL/L (ref 7–16)
AST SERPL-CCNC: 24 U/L (ref 12–45)
BILIRUB SERPL-MCNC: 0.2 MG/DL (ref 0.1–1.5)
BUN SERPL-MCNC: 6 MG/DL (ref 8–22)
CALCIUM SERPL-MCNC: 8.4 MG/DL (ref 8.4–10.2)
CHLORIDE SERPL-SCNC: 106 MMOL/L (ref 96–112)
CO2 SERPL-SCNC: 25 MMOL/L (ref 20–33)
CREAT SERPL-MCNC: 0.75 MG/DL (ref 0.5–1.4)
ERYTHROCYTE [DISTWIDTH] IN BLOOD BY AUTOMATED COUNT: 55.8 FL (ref 35.9–50)
GLOBULIN SER CALC-MCNC: 4.1 G/DL (ref 1.9–3.5)
GLUCOSE BLD-MCNC: 132 MG/DL (ref 65–99)
GLUCOSE BLD-MCNC: 153 MG/DL (ref 65–99)
GLUCOSE BLD-MCNC: 194 MG/DL (ref 65–99)
GLUCOSE SERPL-MCNC: 193 MG/DL (ref 65–99)
HCT VFR BLD AUTO: 39.2 % (ref 42–52)
HGB BLD-MCNC: 11.6 G/DL (ref 14–18)
MCH RBC QN AUTO: 24.8 PG (ref 27–33)
MCHC RBC AUTO-ENTMCNC: 29.6 G/DL (ref 33.7–35.3)
MCV RBC AUTO: 83.9 FL (ref 81.4–97.8)
PHOSPHATE SERPL-MCNC: 3 MG/DL (ref 2.5–4.5)
PLATELET # BLD AUTO: 616 K/UL (ref 164–446)
PMV BLD AUTO: 10.6 FL (ref 9–12.9)
POTASSIUM SERPL-SCNC: 3.8 MMOL/L (ref 3.6–5.5)
PROT SERPL-MCNC: 6.3 G/DL (ref 6–8.2)
RBC # BLD AUTO: 4.67 M/UL (ref 4.7–6.1)
SODIUM SERPL-SCNC: 138 MMOL/L (ref 135–145)
WBC # BLD AUTO: 6.9 K/UL (ref 4.8–10.8)

## 2020-08-07 PROCEDURE — A9270 NON-COVERED ITEM OR SERVICE: HCPCS | Performed by: INTERNAL MEDICINE

## 2020-08-07 PROCEDURE — 99232 SBSQ HOSP IP/OBS MODERATE 35: CPT | Performed by: HOSPITALIST

## 2020-08-07 PROCEDURE — 700111 HCHG RX REV CODE 636 W/ 250 OVERRIDE (IP): Performed by: NURSE PRACTITIONER

## 2020-08-07 PROCEDURE — 94640 AIRWAY INHALATION TREATMENT: CPT

## 2020-08-07 PROCEDURE — 700105 HCHG RX REV CODE 258: Performed by: INTERNAL MEDICINE

## 2020-08-07 PROCEDURE — 770006 HCHG ROOM/CARE - MED/SURG/GYN SEMI*

## 2020-08-07 PROCEDURE — A9270 NON-COVERED ITEM OR SERVICE: HCPCS | Performed by: NURSE PRACTITIONER

## 2020-08-07 PROCEDURE — A9270 NON-COVERED ITEM OR SERVICE: HCPCS | Performed by: HOSPITALIST

## 2020-08-07 PROCEDURE — 700102 HCHG RX REV CODE 250 W/ 637 OVERRIDE(OP): Performed by: INTERNAL MEDICINE

## 2020-08-07 PROCEDURE — 97606 NEG PRS WND THER DME>50 SQCM: CPT

## 2020-08-07 PROCEDURE — 700101 HCHG RX REV CODE 250: Performed by: INTERNAL MEDICINE

## 2020-08-07 PROCEDURE — 700102 HCHG RX REV CODE 250 W/ 637 OVERRIDE(OP): Performed by: HOSPITALIST

## 2020-08-07 PROCEDURE — 80053 COMPREHEN METABOLIC PANEL: CPT

## 2020-08-07 PROCEDURE — 700102 HCHG RX REV CODE 250 W/ 637 OVERRIDE(OP): Performed by: NURSE PRACTITIONER

## 2020-08-07 PROCEDURE — 36415 COLL VENOUS BLD VENIPUNCTURE: CPT

## 2020-08-07 PROCEDURE — 82962 GLUCOSE BLOOD TEST: CPT | Mod: 91

## 2020-08-07 PROCEDURE — 85027 COMPLETE CBC AUTOMATED: CPT

## 2020-08-07 PROCEDURE — 94760 N-INVAS EAR/PLS OXIMETRY 1: CPT

## 2020-08-07 PROCEDURE — 700111 HCHG RX REV CODE 636 W/ 250 OVERRIDE (IP): Performed by: INTERNAL MEDICINE

## 2020-08-07 PROCEDURE — 700105 HCHG RX REV CODE 258: Performed by: HOSPITALIST

## 2020-08-07 PROCEDURE — 84100 ASSAY OF PHOSPHORUS: CPT

## 2020-08-07 RX ORDER — CALCIUM CARBONATE 500 MG/1
500-1000 TABLET, CHEWABLE ORAL EVERY 6 HOURS PRN
Status: DISCONTINUED | OUTPATIENT
Start: 2020-08-07 | End: 2020-08-22 | Stop reason: HOSPADM

## 2020-08-07 RX ORDER — INSULIN GLARGINE 100 [IU]/ML
22 INJECTION, SOLUTION SUBCUTANEOUS NIGHTLY
Status: DISCONTINUED | OUTPATIENT
Start: 2020-08-07 | End: 2020-08-19

## 2020-08-07 RX ORDER — DEXTROSE MONOHYDRATE 25 G/50ML
50 INJECTION, SOLUTION INTRAVENOUS
Status: DISCONTINUED | OUTPATIENT
Start: 2020-08-07 | End: 2020-08-19

## 2020-08-07 RX ADMIN — ALBUTEROL SULFATE 2 PUFF: 90 AEROSOL, METERED RESPIRATORY (INHALATION) at 02:53

## 2020-08-07 RX ADMIN — OMEPRAZOLE 20 MG: 20 CAPSULE, DELAYED RELEASE ORAL at 06:20

## 2020-08-07 RX ADMIN — INSULIN LISPRO 4 UNITS: 100 INJECTION, SOLUTION INTRAVENOUS; SUBCUTANEOUS at 06:45

## 2020-08-07 RX ADMIN — THERA TABS 1 TABLET: TAB at 06:19

## 2020-08-07 RX ADMIN — AMPICILLIN SODIUM AND SULBACTAM SODIUM 3 G: 2; 1 INJECTION, POWDER, FOR SOLUTION INTRAMUSCULAR; INTRAVENOUS at 06:20

## 2020-08-07 RX ADMIN — APIXABAN 5 MG: 5 TABLET, FILM COATED ORAL at 17:05

## 2020-08-07 RX ADMIN — FLUCONAZOLE 400 MG: 200 TABLET ORAL at 06:19

## 2020-08-07 RX ADMIN — MORPHINE SULFATE 4 MG: 4 INJECTION INTRAVENOUS at 08:50

## 2020-08-07 RX ADMIN — APIXABAN 5 MG: 5 TABLET, FILM COATED ORAL at 06:20

## 2020-08-07 RX ADMIN — ALBUTEROL SULFATE 2 PUFF: 90 AEROSOL, METERED RESPIRATORY (INHALATION) at 00:29

## 2020-08-07 RX ADMIN — OXYCODONE HYDROCHLORIDE 15 MG: 5 TABLET ORAL at 16:19

## 2020-08-07 RX ADMIN — MORPHINE SULFATE 2 MG: 4 INJECTION INTRAVENOUS at 17:06

## 2020-08-07 RX ADMIN — OXYCODONE HYDROCHLORIDE 15 MG: 5 TABLET ORAL at 06:19

## 2020-08-07 RX ADMIN — LORAZEPAM 1 MG: 2 INJECTION INTRAMUSCULAR; INTRAVENOUS at 08:47

## 2020-08-07 RX ADMIN — OXYCODONE HYDROCHLORIDE 15 MG: 5 TABLET ORAL at 12:23

## 2020-08-07 RX ADMIN — ALBUTEROL SULFATE 2 PUFF: 90 AEROSOL, METERED RESPIRATORY (INHALATION) at 07:23

## 2020-08-07 RX ADMIN — INSULIN LISPRO 3 UNITS: 100 INJECTION, SOLUTION INTRAVENOUS; SUBCUTANEOUS at 21:59

## 2020-08-07 RX ADMIN — HYDROMORPHONE HYDROCHLORIDE 1 MG: 1 INJECTION, SOLUTION INTRAMUSCULAR; INTRAVENOUS; SUBCUTANEOUS at 08:46

## 2020-08-07 RX ADMIN — SODIUM CHLORIDE: 9 INJECTION, SOLUTION INTRAVENOUS at 22:01

## 2020-08-07 RX ADMIN — AMPICILLIN SODIUM AND SULBACTAM SODIUM 3 G: 2; 1 INJECTION, POWDER, FOR SOLUTION INTRAMUSCULAR; INTRAVENOUS at 23:57

## 2020-08-07 RX ADMIN — MORPHINE SULFATE 2 MG: 4 INJECTION INTRAVENOUS at 19:34

## 2020-08-07 RX ADMIN — INSULIN GLARGINE 11 UNITS: 100 INJECTION, SOLUTION SUBCUTANEOUS at 21:57

## 2020-08-07 RX ADMIN — SENNOSIDES-DOCUSATE SODIUM TAB 8.6-50 MG 2 TABLET: 8.6-5 TAB at 17:10

## 2020-08-07 RX ADMIN — ALBUTEROL SULFATE 2 PUFF: 90 AEROSOL, METERED RESPIRATORY (INHALATION) at 19:40

## 2020-08-07 RX ADMIN — AMPICILLIN SODIUM AND SULBACTAM SODIUM 3 G: 2; 1 INJECTION, POWDER, FOR SOLUTION INTRAMUSCULAR; INTRAVENOUS at 17:06

## 2020-08-07 RX ADMIN — AMPICILLIN SODIUM AND SULBACTAM SODIUM 3 G: 2; 1 INJECTION, POWDER, FOR SOLUTION INTRAMUSCULAR; INTRAVENOUS at 12:14

## 2020-08-07 RX ADMIN — AMPICILLIN SODIUM AND SULBACTAM SODIUM 3 G: 2; 1 INJECTION, POWDER, FOR SOLUTION INTRAMUSCULAR; INTRAVENOUS at 01:20

## 2020-08-07 RX ADMIN — LIDOCAINE HYDROCHLORIDE 20 ML: 20 INJECTION, SOLUTION INFILTRATION; PERINEURAL at 08:46

## 2020-08-07 ASSESSMENT — ENCOUNTER SYMPTOMS
WEAKNESS: 1
RESPIRATORY NEGATIVE: 1
GASTROINTESTINAL NEGATIVE: 1
CARDIOVASCULAR NEGATIVE: 1
MYALGIAS: 1
EYES NEGATIVE: 1
CHILLS: 0
FEVER: 0

## 2020-08-07 NOTE — DISCHARGE PLANNING
Received Choice form at 1245  Agency/Facility Name: [facility]   Referral sent per Choice form at 1335.       @1500  Agency/Facility Name: [facility]  Outcome: [facility] left voicemail for f/u.

## 2020-08-07 NOTE — PROGRESS NOTES
"S: Wound vac in place maintaining suction.  Pain controlled.  Go draining clear.    O: /86   Pulse 86   Temp 36.7 °C (98 °F) (Oral)   Resp 16   Ht 1.88 m (6' 2\")   Wt 92.3 kg (203 lb 7.8 oz)   SpO2 96%     Intake/Output Summary (Last 24 hours) at 8/2/2020 0854  Last data filed at 8/2/2020 0500  Gross per 24 hour   Intake 1580 ml   Output 4940 ml   Net -3360 ml     Pt alert, NAD  abd soft  Go with clear yellow urine  Wound vac in place with erythematous tracking into inguinal area.  No areas of necrotic tissue     Recent Labs     08/04/20  0431 08/05/20  0351 08/06/20  0818   WBC 12.1* 8.2 8.4   RBC 4.84 4.86 4.68*   HEMOGLOBIN 12.0* 12.2* 11.7*   HEMATOCRIT 41.0* 41.0* 39.4*   MCV 84.7 84.4 84.2   MCH 24.8* 25.1* 25.0*   MCHC 29.3* 29.8* 29.7*   RDW 57.1* 56.4* 56.9*   PLATELETCT 682* 663* 639*   MPV 10.4 11.2 10.9     Recent Labs     08/04/20  0431 08/05/20  0351 08/06/20  0818   SODIUM 135 136 139   POTASSIUM 4.9 4.1 3.7   CHLORIDE 99 100 103   CO2 26 28 29   GLUCOSE 164* 130* 109*   BUN 7* 5* 4*   CREATININE 0.83 0.78 0.90   CALCIUM 8.4 8.5 8.3*       Assessment:   68 yo M s/p multiple debridements for Landon's gangrene of the right scrotum, perineum, and inguinal canal.     Plan:  Cont wound vac - next change due 8/7  Cont abx per ID recommendations.  Cont go until pt able to use urinal without disrupting wound vac seal.  "

## 2020-08-07 NOTE — WOUND TEAM
[facility]  Inpatient Services  Wound and Skin Care Progress note    Admission Date: 7/25/2020     Last order of IP CONSULT TO WOUND CARE was found on 7/30/2020 from Hospital Encounter on 7/25/2020     HPI, PMH, SH: Reviewed    Unit where seen by Wound Team: 2215/01     WOUND CONSULT/FOLLOW-UP RELATED TO:  Scheduled NPWT dsg change at    Self Report / Pain Level:  Premed with IV ativan and dilaudid and 1st half dose of morphine. Received IV morphine once more during dressing change. Lidocaine to site. BP and O2 sat monitored throughout. Adhesive remover spray used to decrease pain.    OBJECTIVE:  drsg intact, pressure redistribution mattress    WOUND TYPE, LOCATION, CHARACTERISTICS (Pressure Injuries: location, stage, POA or date identified)    Negative Pressure Wound Therapy 07/27/20 Surgical (Active)   NPWT Pump Mode / Pressure Setting Ulta;125 mmHg    Dressing Type Medium;Black Foam (Veraflo)    Number of Foam Pieces Used 1    Canister Changed No    NEXT Dressing Change/Treatment Date 08/10/20    VAC VeraFlo Irrigant Normal Saline    VAC VeraFlo Soak Time (mins) 4    VAC VeraFlo Instill Volume (ml) 16    VAC VeraFlo - Therapy Time (hrs) 2.5    VAC VeraFlo Pressure (mm/Hg) 125 mmHg            Wound 07/25/20 Full Thickness Wound Scrotum Right (Active)   Wound Image   08/07/20 0900   Site Assessment Red;Granulation tissue    Periwound Assessment Intact;Induration    Margins Epibole (rolled edges)    Closure Secondary intention    Drainage Amount Small    Drainage Description Serosanguineous    Treatments Cleansed;Site care    Wound Cleansing Normal Saline Irrigation    Periwound Protectant Skin Protectant Wipes to Periwound;Paste Ring    Dressing Cleansing/Solutions Normal Saline    Dressing Options Wound Vac    Dressing Changed Changed    Dressing Status Intact    Dressing Change/Treatment Frequency Monday, Wednesday, Friday, and As Needed    NEXT Dressing Change/Treatment Date 08/10/20    NEXT  Weekly Photo (Inpatient Only) 08/14/20    Non-staged Wound Description Full thickness    Wound Length (cm) 11.7 cm    Wound Width (cm) 8 cm    Wound Depth (cm) 3.9 cm    Wound Surface Area (cm^2) 93.6 cm^2    Wound Volume (cm^3) 365.04 cm^3    Wound Healing % -719    Wound Bed Granulation (%) 95 %    Tunneling (cm) 5.7 cm    Tunneling Clock Position of Wound 11    Undermining (cm) 4.3 cm    Undermining of Wound, 1st Location From 9 o'clock;To 10 o'clock    Undermining (cm) - 2nd location 3.4 cm    Undermining of Wound, 2nd Location From 2 o'clock;To 3 o'clock    Shape irregular    Wound Odor None    Pulses N/A    Exposed Structures Other (Comments);Connective tissue        Vascular:    JW:   No results found.      Lab Values:    Lab Results   Component Value Date/Time    WBC 6.9 08/07/2020 04:10 AM    RBC 4.67 (L) 08/07/2020 04:10 AM    HEMOGLOBIN 11.6 (L) 08/07/2020 04:10 AM    HEMATOCRIT 39.2 (L) 08/07/2020 04:10 AM    CREACTPROT 0.58 07/01/2020 07:03 AM    SEDRATEWES 15 07/01/2020 07:03 AM    HBA1C 8.6 (A) 01/07/2020 07:14 AM            Culture Results show:  Recent Results (from the past 720 hour(s))   CULTURE WOUND W/ GRAM STAIN    Collection Time: 07/31/20  6:45 PM    Specimen: Wound   Result Value Ref Range    Significant Indicator POS (POS)     Source WND     Site SCROTAL ABSCESS     Culture Result (A)      Growth noted after further incubation, see below for  organism identification.      Gram Stain Result       Many WBCs.  Rare Gram positive cocci.  Rare Yeast.      Culture Result Candida albicans  Rare growth   (A)          INTERVENTIONS BY WOUND TEAM: Dressing moistened with NS and topical lidocaine prior to removal. Foam and adaptic gently removed. Wound and periwound cleansed with NS and gauze. Additional lidocaine applied to wound bed. No sting barrier to sharmaine wound; pt states benzoin stings. 2 paste rings to sharmaine wound. Exposed testicle covered with 1 layer of adaptic. 1 pc of foam into R inguinal  track and base of wound. Drape and tracpad to foam. No change in vac settings,    Interdisciplinary consultation: Patient, Zohaib RN, nursing student, Reba Wilkes, Case management.      EVALUATION: Pain level remains high during dressing changes. Pt describes it as coming in bursts and may be related to nerve pain, especially to R inguinal area. The wound is progressing as expected with almost full granular tissue. Decreased erythema and induration noted. Track has also decreased in length. Pt continues to need all IV meds available as he is not able to tolerate otherwise.     Goals: Steady decrease in wound area and depth weekly.    NURSING PLAN OF CARE ORDERS (X):     Dressing changes: Continue previous Dressing Maintenance orders: x       See new Dressing Maintenance orders:       Skin care: See Skin Care orders:        Rectal tube care: See Rectal Tube Care orders:      Other orders:           WOUND TEAM PLAN OF CARE:  Dressing changes by wound team:  x        Follow up 1-2 times weekly:               Follow up 3 times weekly:                NPWT change 3 times weekly:     Follow up as needed:       Other (explain):     Anticipated discharge plans:   LTACH:        SNF/Rehab: x                 Home Care:           Outpatient Wound Center:            Self Care:           Other:

## 2020-08-07 NOTE — CARE PLAN
"  Problem: Safety  Goal: Will remain free from injury  Outcome: PROGRESSING AS EXPECTED  Goal: Will remain free from falls  Outcome: PROGRESSING AS EXPECTED  Intervention: Assess risk factors for falls  Flowsheets  Taken 8/7/2020 0726  Pt Calls for Assistance: Yes  Taken 8/6/2020 2231  Mobility Status Assessment: 2-2 Healthcare Providers Required for Assistance with Ambulation & Transfer  Intervention: Implement fall precautions  Flowsheets  Taken 8/7/2020 0726  Bed Alarm: Yes - Alarm On  Taken 8/6/2020 2231  Environmental Precautions:   Treaded Slipper Socks on Patient   Personal Belongings, Wastebasket, Call Bell etc. in Easy Reach   Report Given to Other Health Care Providers Regarding Fall Risk   Bed in Low Position   Communication Sign for Patients & Families   Mobility Assessed & Appropriate Sign Placed     Problem: Pain Management  Goal: Pain level will decrease to patient's comfort goal  Outcome: PROGRESSING SLOWER THAN EXPECTED  Note: Pt states oxy is \"not touching it anymore\" with regards to his pain. Pt states only the morphine has been able to relieve the pain.     "

## 2020-08-07 NOTE — CARE PLAN
Problem: Pain Management  Goal: Pain level will decrease to patient's comfort goal  Outcome: PROGRESSING AS EXPECTED  Intervention: Follow pain managment plan developed in collaboration with patient and Interdisciplinary Team  Note: Making sure pain is controlled to a tolerable level conducive to ambulation in and out of bed. Assessing pain level while rounding, and medicating prn as ordered by MD.       Problem: Safety  Goal: Will remain free from injury  Outcome: PROGRESSING AS EXPECTED  Intervention: Provide assistance with mobility  Note: Bed in low position, treaded slipper socks on, and call light in reach. Pt instructed to call nurse if ambulating out of bed.

## 2020-08-07 NOTE — PROGRESS NOTES
"Pt states oxycodone is \"not touching it anymore\" with regards to his pain. Pt states only the morphine has been able to relieve the pain.    Pt was disoriented to time multiple times this shift.    Pt performed ROM exercises for BLE and sacrum throughout the shift.  Pt states he has noticed increased mobility and decreased tenderness in groin and scrotal area.  Pt states concerns about losing strength due to pain when mobilizing.     Pt states L lung soreness \"as if I was punched in the side\" and states it hurts to take deep breaths on that side and as of this morning, it is starting to hurt to take normal breaths. Heat packs and albuterol treatments from RT are not helping per pt.    Wound care done to sacrum. Interventions not needed for wound vac. Wound vac is draining small amount of serosanguineous.     Pt tolerating 2L NC.    Pt required 1 unit of insulin last night and 7 this morning but pt only took partial dose of 4 units this morning.    "

## 2020-08-07 NOTE — ASSESSMENT & PLAN NOTE
-Multiple comorbidities  -Now with Landon's gangrene  -Body mass index is 26.13 kg/m².  -PT/OT  -Encourage p.o. intake  -3 times daily supplements  -Fall precautions  -Sleep hygiene  -Palliative care conference yesterday. Patient remains FULL CODE.

## 2020-08-07 NOTE — PROGRESS NOTES
Hospital Medicine Daily Progress Note    Date of Service  8/7/2020    Chief Complaint  69 y.o. 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 Schulte is a 69 y.o. male with a PMhx of CAD s/p PCI, pAF on eliquis, HTN, SLE on imuran & prednisone, polycytemia on hydroxyurea,who presented 7/25/2020 due to  recurrent rash on legs and buttocks followed by dermatology, recurrent cysts on his back needing I&D, Type II DM not well controlled insulin dependent (A1c 8.6%), has had a rash on his inner thigh that he is following up with Dermatology. Required insulin drip on admission.  Patient admitted for further evaluation and treatment. On 7/25: I&D right hemiscrotum  (Dr Power), 7/29: Further debridement (Dr. Viera)  7/31: Further debridement with wound vac placement (Dr Rosales), 8/3: Wound vac changes under anesthesia (Dr Rosales).        Interval Problem Update  -Patient seen and examined today.  Patient reporting discomfort after wound dressing change today.  Patient still encouraged to work with PT OT for postacute recommendations.  Patient aware and POC.  -POC: Wound changed today 8/7; continue antibiotic therapy per ID recommendation - see Dr. Clemente's note; pain control; wound care; PT/OT; pending SNF placement and accepatance.    -Lab work: Reviewed; expected  -VSS at this time  -Ordered: CBC and CMP - need to check LFT d/t abx therapy  -No significant changes from my previous ROS or PE, please see my previous note for further details.    Consultants/Specialty  -Urology -Dr. Rosales  -Infectious disease -Dr. Clemente    Code Status  Full    Disposition  TBD    Review of Systems  Review of Systems   Constitutional: Positive for malaise/fatigue. Negative for chills and fever.   HENT: Negative.    Eyes: Negative.    Respiratory: Negative.    Cardiovascular: Negative.    Gastrointestinal: Negative.    Genitourinary: Positive for dysuria.   Musculoskeletal: Positive for myalgias.    Skin: Negative.    Neurological: Positive for weakness.        Physical Exam  Temp:  [36.7 °C (98 °F)-37 °C (98.6 °F)] 36.7 °C (98 °F)  Pulse:  [73-86] 82  Resp:  [16-20] 18  BP: (130-142)/(70-88) 141/88  SpO2:  [96 %-99 %] 96 %    Physical Exam  Vitals signs and nursing note reviewed.   Constitutional:       Appearance: He is ill-appearing.   HENT:      Head: Normocephalic.      Nose: Nose normal.      Mouth/Throat:      Mouth: Mucous membranes are moist.   Eyes:      Pupils: Pupils are equal, round, and reactive to light.   Neck:      Musculoskeletal: Neck supple.   Cardiovascular:      Rate and Rhythm: Normal rate and regular rhythm.      Pulses: Normal pulses.      Heart sounds: Normal heart sounds.   Pulmonary:      Effort: Pulmonary effort is normal.   Abdominal:      General: Bowel sounds are normal.   Genitourinary:     Penis: Normal.       Comments: Scrotal swelling; wound vac in place  Musculoskeletal: Normal range of motion.   Skin:     General: Skin is warm.      Capillary Refill: Capillary refill takes 2 to 3 seconds.   Neurological:      Mental Status: He is alert. Mental status is at baseline.         Fluids    Intake/Output Summary (Last 24 hours) at 8/7/2020 1040  Last data filed at 8/7/2020 0359  Gross per 24 hour   Intake 1047.99 ml   Output 4250 ml   Net -3202.01 ml       Laboratory  Recent Labs     08/05/20  0351 08/06/20  0818 08/07/20  0410   WBC 8.2 8.4 6.9   RBC 4.86 4.68* 4.67*   HEMOGLOBIN 12.2* 11.7* 11.6*   HEMATOCRIT 41.0* 39.4* 39.2*   MCV 84.4 84.2 83.9   MCH 25.1* 25.0* 24.8*   MCHC 29.8* 29.7* 29.6*   RDW 56.4* 56.9* 55.8*   PLATELETCT 663* 639* 616*   MPV 11.2 10.9 10.6     Recent Labs     08/05/20  0351 08/06/20  0818 08/07/20  0410   SODIUM 136 139 138   POTASSIUM 4.1 3.7 3.8   CHLORIDE 100 103 106   CO2 28 29 25   GLUCOSE 130* 109* 193*   BUN 5* 4* 6*   CREATININE 0.78 0.90 0.75   CALCIUM 8.5 8.3* 8.4                   Imaging  IR-MIDLINE CATHETER INSERTION WO GUIDANCE > AGE 3    Final Result                  Ultrasound-guided midline placement performed by qualified nursing staff    as above.          DX-CHEST-PORTABLE (1 VIEW)   Final Result         1. No acute cardiopulmonary abnormalities are identified.      CT-ABDOMEN-PELVIS WITH   Final Result      1.  Diffuse scrotal edema, eccentric to the RIGHT with extensive soft tissue gas present particularly in the RIGHT groin and hemiscrotum, consistent with Landon's gangrene.   2.  No discrete drainable abscess demonstrated.   3.  Minimal retroperitoneal adenopathy, nonspecific.   4.  Nonobstructing LEFT kidney stone.   5.  Splenomegaly.   6.  Cystic lesion in the pancreatic tail measuring 19 mm.  Neoplasm is not excluded.  Consider further evaluation with MRI.      These findings were discussed with AYALA HAMPTON on 7/25/2020 3:41 PM.      DX-CHEST-PORTABLE (1 VIEW)   Final Result      1.  There is no acute cardiopulmonary process.           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 Clemente: Continue Unasyn and fluconazole for now, will need to transition to Augmentin for 7-10-day course once prepping for discharge; control blood sugar  -Wound VAC in place; placed on 8/3 with urology, Dr. Rosales;wound dressing changed on 8/7  -Pain control  -Wound care  -Ordered PT/OT  -Referred SNF placement - pending placement and acceptance    CAD (coronary artery disease)- (present on admission)  Assessment & Plan  No acute events      Type 2 diabetes mellitus, with long-term current use of insulin (HCC)- (present on admission)  Assessment & Plan  -Lantus 22 units- whs  -SSI      Paroxysmal A-fib (HCC)- (present on admission)  Assessment & Plan  -Hold eliquis secondary to interventions  -Rate controlled      Debility  Assessment & Plan  -Patient deconditioned status   -Has been refusing PT/OT due to pain from wound  -Extensive RIGHT scrotal would - wound vac in place    Pancreatic  cyst  Assessment & Plan  Follow up with imaging as outpatient      SLE (systemic lupus erythematosus related syndrome) (HCC)  Assessment & Plan  Prednisone on hold secondary to infection - watch for issues with adrenal insuffiencey         VTE prophylaxis: SCDs  ==============================================================================================================================  Please note that this dictation was created using voice recognition software. I have made every reasonable attempt to correct obvious errors, but there may be errors of grammar and possibly content that I did not discover before finalizing the note.    Electronically signed by:  RASHEL Mari, MSN, APRN, FNP-C  Hospitalist Services  [facility]  [phone]  [email or web address]  08/07/20     1049

## 2020-08-07 NOTE — PROGRESS NOTES
"S: Wound vac in place maintaining suction.  Pain controlled.  Go draining clear.    O: /80   Pulse 76   Temp 36.7 °C (98 °F) (Oral)   Resp 18   Ht 1.88 m (6' 2\")   Wt 92.3 kg (203 lb 7.8 oz)   SpO2 96%     Intake/Output Summary (Last 24 hours) at 8/2/2020 0854  Last data filed at 8/2/2020 0500  Gross per 24 hour   Intake 1580 ml   Output 4940 ml   Net -3360 ml     Pt alert, NAD  abd soft  Go with clear yellow urine  Wound vac in place with erythema tracking into inguinal area.  No areas of necrotic tissue     Recent Labs     08/05/20  0351 08/06/20  0818 08/07/20  0410   WBC 8.2 8.4 6.9   RBC 4.86 4.68* 4.67*   HEMOGLOBIN 12.2* 11.7* 11.6*   HEMATOCRIT 41.0* 39.4* 39.2*   MCV 84.4 84.2 83.9   MCH 25.1* 25.0* 24.8*   MCHC 29.8* 29.7* 29.6*   RDW 56.4* 56.9* 55.8*   PLATELETCT 663* 639* 616*   MPV 11.2 10.9 10.6     Recent Labs     08/05/20  0351 08/06/20  0818 08/07/20  0410   SODIUM 136 139 138   POTASSIUM 4.1 3.7 3.8   CHLORIDE 100 103 106   CO2 28 29 25   GLUCOSE 130* 109* 193*   BUN 5* 4* 6*   CREATININE 0.78 0.90 0.75   CALCIUM 8.5 8.3* 8.4       Assessment:   68 yo M s/p multiple debridements for Landon's gangrene of the right scrotum, perineum, and inguinal canal.     Plan:  Cont wound vac - next change due 8/7  Cont abx per ID recommendations.  Cont go until pt able to use urinal without disrupting wound vac seal.  Urology will sign off.  Call with questions.   "

## 2020-08-08 LAB
ALBUMIN SERPL BCP-MCNC: 2.2 G/DL (ref 3.2–4.9)
ANION GAP SERPL CALC-SCNC: 4 MMOL/L (ref 7–16)
BUN SERPL-MCNC: 4 MG/DL (ref 8–22)
CALCIUM SERPL-MCNC: 8.2 MG/DL (ref 8.4–10.2)
CHLORIDE SERPL-SCNC: 105 MMOL/L (ref 96–112)
CO2 SERPL-SCNC: 27 MMOL/L (ref 20–33)
CREAT SERPL-MCNC: 0.77 MG/DL (ref 0.5–1.4)
ERYTHROCYTE [DISTWIDTH] IN BLOOD BY AUTOMATED COUNT: 54.8 FL (ref 35.9–50)
GLUCOSE BLD-MCNC: 109 MG/DL (ref 65–99)
GLUCOSE BLD-MCNC: 118 MG/DL (ref 65–99)
GLUCOSE BLD-MCNC: 137 MG/DL (ref 65–99)
GLUCOSE BLD-MCNC: 143 MG/DL (ref 65–99)
GLUCOSE SERPL-MCNC: 136 MG/DL (ref 65–99)
HCT VFR BLD AUTO: 37.8 % (ref 42–52)
HGB BLD-MCNC: 11.4 G/DL (ref 14–18)
MCH RBC QN AUTO: 25 PG (ref 27–33)
MCHC RBC AUTO-ENTMCNC: 30.2 G/DL (ref 33.7–35.3)
MCV RBC AUTO: 82.9 FL (ref 81.4–97.8)
PHOSPHATE SERPL-MCNC: 3 MG/DL (ref 2.5–4.5)
PLATELET # BLD AUTO: 640 K/UL (ref 164–446)
PMV BLD AUTO: 10.7 FL (ref 9–12.9)
POTASSIUM SERPL-SCNC: 3.5 MMOL/L (ref 3.6–5.5)
RBC # BLD AUTO: 4.56 M/UL (ref 4.7–6.1)
SODIUM SERPL-SCNC: 136 MMOL/L (ref 135–145)
WBC # BLD AUTO: 8 K/UL (ref 4.8–10.8)

## 2020-08-08 PROCEDURE — 700102 HCHG RX REV CODE 250 W/ 637 OVERRIDE(OP): Performed by: INTERNAL MEDICINE

## 2020-08-08 PROCEDURE — 700105 HCHG RX REV CODE 258: Performed by: INTERNAL MEDICINE

## 2020-08-08 PROCEDURE — 700102 HCHG RX REV CODE 250 W/ 637 OVERRIDE(OP): Performed by: HOSPITALIST

## 2020-08-08 PROCEDURE — 36415 COLL VENOUS BLD VENIPUNCTURE: CPT

## 2020-08-08 PROCEDURE — A9270 NON-COVERED ITEM OR SERVICE: HCPCS | Performed by: INTERNAL MEDICINE

## 2020-08-08 PROCEDURE — 85027 COMPLETE CBC AUTOMATED: CPT

## 2020-08-08 PROCEDURE — 700111 HCHG RX REV CODE 636 W/ 250 OVERRIDE (IP): Performed by: NURSE PRACTITIONER

## 2020-08-08 PROCEDURE — 700102 HCHG RX REV CODE 250 W/ 637 OVERRIDE(OP): Performed by: NURSE PRACTITIONER

## 2020-08-08 PROCEDURE — 700111 HCHG RX REV CODE 636 W/ 250 OVERRIDE (IP): Performed by: INTERNAL MEDICINE

## 2020-08-08 PROCEDURE — 94760 N-INVAS EAR/PLS OXIMETRY 1: CPT

## 2020-08-08 PROCEDURE — 99232 SBSQ HOSP IP/OBS MODERATE 35: CPT | Performed by: HOSPITALIST

## 2020-08-08 PROCEDURE — 82962 GLUCOSE BLOOD TEST: CPT | Mod: 91

## 2020-08-08 PROCEDURE — 94640 AIRWAY INHALATION TREATMENT: CPT

## 2020-08-08 PROCEDURE — A9270 NON-COVERED ITEM OR SERVICE: HCPCS | Performed by: HOSPITALIST

## 2020-08-08 PROCEDURE — 80069 RENAL FUNCTION PANEL: CPT

## 2020-08-08 PROCEDURE — A9270 NON-COVERED ITEM OR SERVICE: HCPCS | Performed by: NURSE PRACTITIONER

## 2020-08-08 PROCEDURE — 770006 HCHG ROOM/CARE - MED/SURG/GYN SEMI*

## 2020-08-08 RX ORDER — ALBUTEROL SULFATE 90 UG/1
2 AEROSOL, METERED RESPIRATORY (INHALATION) EVERY 4 HOURS PRN
Status: DISCONTINUED | OUTPATIENT
Start: 2020-08-08 | End: 2020-08-22 | Stop reason: HOSPADM

## 2020-08-08 RX ORDER — MORPHINE SULFATE 15 MG/1
15 TABLET, FILM COATED, EXTENDED RELEASE ORAL EVERY 12 HOURS
Status: DISCONTINUED | OUTPATIENT
Start: 2020-08-08 | End: 2020-08-13

## 2020-08-08 RX ORDER — HYDROXYUREA 500 MG/1
500 CAPSULE ORAL DAILY
Status: DISCONTINUED | OUTPATIENT
Start: 2020-08-08 | End: 2020-08-08

## 2020-08-08 RX ORDER — POTASSIUM CHLORIDE 20 MEQ/1
40 TABLET, EXTENDED RELEASE ORAL DAILY
Status: COMPLETED | OUTPATIENT
Start: 2020-08-08 | End: 2020-08-10

## 2020-08-08 RX ORDER — HYDROXYUREA 500 MG/1
500 CAPSULE ORAL NIGHTLY
Status: DISCONTINUED | OUTPATIENT
Start: 2020-08-08 | End: 2020-08-13

## 2020-08-08 RX ADMIN — APIXABAN 5 MG: 5 TABLET, FILM COATED ORAL at 05:39

## 2020-08-08 RX ADMIN — OXYCODONE HYDROCHLORIDE 15 MG: 5 TABLET ORAL at 15:51

## 2020-08-08 RX ADMIN — MORPHINE SULFATE 15 MG: 15 TABLET, EXTENDED RELEASE ORAL at 17:26

## 2020-08-08 RX ADMIN — MORPHINE SULFATE 2 MG: 4 INJECTION INTRAVENOUS at 00:30

## 2020-08-08 RX ADMIN — POTASSIUM CHLORIDE 40 MEQ: 1500 TABLET, EXTENDED RELEASE ORAL at 17:26

## 2020-08-08 RX ADMIN — ASPIRIN 81 MG: 81 TABLET, COATED ORAL at 05:39

## 2020-08-08 RX ADMIN — FLUCONAZOLE 400 MG: 200 TABLET ORAL at 05:39

## 2020-08-08 RX ADMIN — SENNOSIDES-DOCUSATE SODIUM TAB 8.6-50 MG 2 TABLET: 8.6-5 TAB at 05:39

## 2020-08-08 RX ADMIN — AMPICILLIN SODIUM AND SULBACTAM SODIUM 3 G: 2; 1 INJECTION, POWDER, FOR SOLUTION INTRAMUSCULAR; INTRAVENOUS at 17:27

## 2020-08-08 RX ADMIN — OXYCODONE HYDROCHLORIDE 15 MG: 5 TABLET ORAL at 01:33

## 2020-08-08 RX ADMIN — SENNOSIDES-DOCUSATE SODIUM TAB 8.6-50 MG 2 TABLET: 8.6-5 TAB at 17:26

## 2020-08-08 RX ADMIN — OXYCODONE HYDROCHLORIDE 15 MG: 5 TABLET ORAL at 20:39

## 2020-08-08 RX ADMIN — ALBUTEROL SULFATE 2 PUFF: 90 AEROSOL, METERED RESPIRATORY (INHALATION) at 07:46

## 2020-08-08 RX ADMIN — MORPHINE SULFATE 2 MG: 4 INJECTION INTRAVENOUS at 08:58

## 2020-08-08 RX ADMIN — OXYCODONE HYDROCHLORIDE 15 MG: 5 TABLET ORAL at 05:39

## 2020-08-08 RX ADMIN — THERA TABS 1 TABLET: TAB at 05:39

## 2020-08-08 RX ADMIN — OMEPRAZOLE 20 MG: 20 CAPSULE, DELAYED RELEASE ORAL at 05:39

## 2020-08-08 RX ADMIN — APIXABAN 5 MG: 5 TABLET, FILM COATED ORAL at 17:26

## 2020-08-08 RX ADMIN — AMPICILLIN SODIUM AND SULBACTAM SODIUM 3 G: 2; 1 INJECTION, POWDER, FOR SOLUTION INTRAMUSCULAR; INTRAVENOUS at 23:51

## 2020-08-08 RX ADMIN — HYDROXYUREA 500 MG: 500 CAPSULE ORAL at 20:39

## 2020-08-08 RX ADMIN — OXYCODONE HYDROCHLORIDE 15 MG: 5 TABLET ORAL at 10:29

## 2020-08-08 RX ADMIN — AMPICILLIN SODIUM AND SULBACTAM SODIUM 3 G: 2; 1 INJECTION, POWDER, FOR SOLUTION INTRAMUSCULAR; INTRAVENOUS at 05:42

## 2020-08-08 RX ADMIN — AMPICILLIN SODIUM AND SULBACTAM SODIUM 3 G: 2; 1 INJECTION, POWDER, FOR SOLUTION INTRAMUSCULAR; INTRAVENOUS at 13:12

## 2020-08-08 RX ADMIN — MORPHINE SULFATE 2 MG: 4 INJECTION INTRAVENOUS at 04:46

## 2020-08-08 ASSESSMENT — ENCOUNTER SYMPTOMS
GASTROINTESTINAL NEGATIVE: 1
MYALGIAS: 1
CHILLS: 0
RESPIRATORY NEGATIVE: 1
FEVER: 0
WEAKNESS: 1
CARDIOVASCULAR NEGATIVE: 1
EYES NEGATIVE: 1

## 2020-08-08 NOTE — FLOWSHEET NOTE
08/08/20 0700   Events/Summary/Plan   Skin Inspection Respiratory Device Intact   Vital Signs   Pulse 72   Respiration 16   Pulse Oximetry 96 %   $ Pulse Oximetry (Spot Check) Yes   Respiratory Assessment   Level of Consciousness Alert   Breath Sounds   RUL Breath Sounds Clear   RML Breath Sounds Clear   RLL Breath Sounds Diminished   EVELINA Breath Sounds Clear   LLL Breath Sounds Diminished   Oxygen   O2 (LPM) 1   O2 Delivery Device Nasal Cannula

## 2020-08-08 NOTE — PROGRESS NOTES
Pt resting in bed, complains of pain 8/10, medicated per MAR. POC discussed, denies further needs at this time. Wound vac and go in place. IVF infusing to L midline. Safety measures and hourly rounding in place.

## 2020-08-08 NOTE — PROGRESS NOTES
[facility] Medicine Daily Progress Note    Date of Service  8/8/2020    Chief Complaint  69 y.o. 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 Schulte is a 69 y.o. male with a PMhx of CAD s/p PCI, pAF on eliquis, HTN, SLE on imuran & prednisone, polycytemia on hydroxyurea,who presented 7/25/2020 due to  recurrent rash on legs and buttocks followed by dermatology, recurrent cysts on his back needing I&D, Type II DM not well controlled insulin dependent (A1c 8.6%), has had a rash on his inner thigh that he is following up with Dermatology. Required insulin drip on admission.  Patient admitted for further evaluation and treatment. On 7/25: I&D right hemiscrotum  (Dr Power), 7/29: Further debridement (Dr. Viera)  7/31: Further debridement with wound vac placement (Dr Rosales), 8/3: Wound vac changes under anesthesia (Dr Rosales).        Interval Problem Update  -Patient seen and examined today.  Patient a bit agitated today and wants to know the course of plan.  Notified patient again in POC.  -POC: Wound changed recently on 8/7; continue antibiotic therapy per ID recommendation - see Dr. Clemente's note; pain control; wound care; PT/OT; place referral to LTAC as patient needing IV pain medication during wound care; hydroxy urea restarted per discussion with Dr. Sims.   -Lab work: Reviewed; expected; K+ at 3.5, replacing with 40 mEq of K-Dur x3 days  -VSS at this time  -Ordered: CBC and CMP - need to check LFT d/t abx therapy  -No significant changes from my previous ROS or PE, please see my previous note for further details.    Consultants/Specialty  -Urology -Dr. Rosales  -Infectious disease -Dr. Clemente    Code Status  Full    Disposition  TBD    Review of Systems  Review of Systems   Constitutional: Positive for malaise/fatigue. Negative for chills and fever.   HENT: Negative.    Eyes: Negative.    Respiratory: Negative.    Cardiovascular: Negative.    Gastrointestinal:  Negative.    Genitourinary: Positive for dysuria.   Musculoskeletal: Positive for myalgias.   Skin: Negative.    Neurological: Positive for weakness.        Physical Exam  Temp:  [36.3 °C (97.4 °F)-37.3 °C (99.1 °F)] 37.3 °C (99.1 °F)  Pulse:  [72-79] 72  Resp:  [16-20] 16  BP: (122-140)/(72-82) 122/72  SpO2:  [95 %-99 %] 96 %    Physical Exam  Vitals signs and nursing note reviewed.   Constitutional:       Appearance: He is ill-appearing.   HENT:      Head: Normocephalic.      Nose: Nose normal.      Mouth/Throat:      Mouth: Mucous membranes are moist.   Eyes:      Pupils: Pupils are equal, round, and reactive to light.   Neck:      Musculoskeletal: Neck supple.   Cardiovascular:      Rate and Rhythm: Normal rate and regular rhythm.      Pulses: Normal pulses.      Heart sounds: Normal heart sounds.   Pulmonary:      Effort: Pulmonary effort is normal.   Abdominal:      General: Bowel sounds are normal.   Genitourinary:     Penis: Normal.       Comments: Scrotal swelling; wound vac in place  Musculoskeletal: Normal range of motion.   Skin:     General: Skin is warm.      Capillary Refill: Capillary refill takes 2 to 3 seconds.   Neurological:      Mental Status: He is alert. Mental status is at baseline.         Fluids    Intake/Output Summary (Last 24 hours) at 8/8/2020 1048  Last data filed at 8/8/2020 0823  Gross per 24 hour   Intake 4144.02 ml   Output 2450 ml   Net 1694.02 ml       Laboratory  Recent Labs     08/06/20 0818 08/07/20 0410 08/08/20  0420   WBC 8.4 6.9 8.0   RBC 4.68* 4.67* 4.56*   HEMOGLOBIN 11.7* 11.6* 11.4*   HEMATOCRIT 39.4* 39.2* 37.8*   MCV 84.2 83.9 82.9   MCH 25.0* 24.8* 25.0*   MCHC 29.7* 29.6* 30.2*   RDW 56.9* 55.8* 54.8*   PLATELETCT 639* 616* 640*   MPV 10.9 10.6 10.7     Recent Labs     08/06/20  0818 08/07/20  0410 08/08/20  0420   SODIUM 139 138 136   POTASSIUM 3.7 3.8 3.5*   CHLORIDE 103 106 105   CO2 29 25 27   GLUCOSE 109* 193* 136*   BUN 4* 6* 4*   CREATININE 0.90 0.75 0.77    CALCIUM 8.3* 8.4 8.2*                   Imaging  IR-MIDLINE CATHETER INSERTION WO GUIDANCE > AGE 3   Final Result                  Ultrasound-guided midline placement performed by qualified nursing staff    as above.          DX-CHEST-PORTABLE (1 VIEW)   Final Result         1. No acute cardiopulmonary abnormalities are identified.      CT-ABDOMEN-PELVIS WITH   Final Result      1.  Diffuse scrotal edema, eccentric to the RIGHT with extensive soft tissue gas present particularly in the RIGHT groin and hemiscrotum, consistent with Landon's gangrene.   2.  No discrete drainable abscess demonstrated.   3.  Minimal retroperitoneal adenopathy, nonspecific.   4.  Nonobstructing LEFT kidney stone.   5.  Splenomegaly.   6.  Cystic lesion in the pancreatic tail measuring 19 mm.  Neoplasm is not excluded.  Consider further evaluation with MRI.      These findings were discussed with AYALA HAMPTON on 7/25/2020 3:41 PM.      DX-CHEST-PORTABLE (1 VIEW)   Final Result      1.  There is no acute cardiopulmonary process.           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 Clemente: Continue Unasyn and fluconazole for now, will need to transition to Augmentin for 7-10-day course once prepping for discharge; control blood sugar  -Wound VAC in place; placed on 8/3 with urology, Dr. Rosales;wound dressing changed on 8/7  -Pain control  -Wound care  -Ordered PT/OT  -Referred SNF placement - pending placement and acceptance    CAD (coronary artery disease)- (present on admission)  Assessment & Plan  No acute events      Type 2 diabetes mellitus, with long-term current use of insulin (HCC)- (present on admission)  Assessment & Plan  -Lantus 22 units- whs  -SSI      Paroxysmal A-fib (HCC)- (present on admission)  Assessment & Plan  -Hold eliquis secondary to interventions  -Rate controlled      Debility  Assessment & Plan  -Patient deconditioned status   -Has been refusing  PT/OT due to pain from wound  -Extensive RIGHT scrotal would - wound vac in place    Pancreatic cyst  Assessment & Plan  Follow up with imaging as outpatient      SLE (systemic lupus erythematosus related syndrome) (HCC)  Assessment & Plan  Prednisone on hold secondary to infection - watch for issues with adrenal insuffiencey         VTE prophylaxis: SCDs  ==============================================================================================================================  Please note that this dictation was created using voice recognition software. I have made every reasonable attempt to correct obvious errors, but there may be errors of grammar and possibly content that I did not discover before finalizing the note.    Electronically signed by:  RASHEL Mari, MSN, APRN, FNP-C  Hospitalist Services  [facility]  [phone]  [email or web address]  08/08/20    105

## 2020-08-08 NOTE — DISCHARGE PLANNING
Anticipated Discharge Disposition: TBD    Action: [facility]   Per wound RN, both [facility] and [facility] can accommodate veraflow vac. SNF's however are unable to administer IV pain meds during wound care.   Referral sent to [facility] to see if they can accommodate IV pain regime during wound care, as pt is unable to tolerate dressing changes without IV pain medication     Barriers to Discharge: Placement    Plan: F/U with LTAC

## 2020-08-08 NOTE — CARE PLAN
Problem: Pain Management  Goal: Pain level will decrease to patient's comfort goal  Outcome: PROGRESSING AS EXPECTED  Intervention: Follow pain managment plan developed in collaboration with patient and Interdisciplinary Team  Note: Pain controlled with analgesics.     Problem: Communication  Goal: The ability to communicate needs accurately and effectively will improve  Outcome: PROGRESSING AS EXPECTED  Intervention: [place] patient and significant other/support system to call light to alert staff of needs  Note: Pt uses call light appropriately for needs.       Problem: Safety  Goal: Will remain free from falls  Outcome: PROGRESSING AS EXPECTED  Intervention: Implement fall precautions  Flowsheets (Taken 8/7/2020 1930)  Environmental Precautions:   Treaded Slipper Socks on Patient   Personal Belongings, Wastebasket, Call Bell etc. in Easy Reach   Transferred to Stronger Side   Report Given to Other Health Care Providers Regarding Fall Risk   Bed in Low Position   Communication Sign for Patients & Families   Mobility Assessed & Appropriate Sign Placed

## 2020-08-08 NOTE — PROGRESS NOTES
Received report, assumed pt care. Discussed POC. Encouraged CDB and leg exercises while in bed. Assessment done. VSS. Will cont to monitor.

## 2020-08-08 NOTE — FLOWSHEET NOTE
08/07/20 1941   Vital Signs   Pulse 75   Respiration 16   Pulse Oximetry 97 %   $ Pulse Oximetry (Spot Check) Yes   Respiratory Assessment   Level of Consciousness Alert   Chest Exam   Work Of Breathing / Effort Mild   Breath Sounds   RUL Breath Sounds Clear   RML Breath Sounds Clear   RLL Breath Sounds Diminished   EVELINA Breath Sounds Clear   LLL Breath Sounds Diminished   Oxygen   O2 (LPM) 1   O2 Delivery Device Nasal Cannula

## 2020-08-09 LAB
ALBUMIN SERPL BCP-MCNC: 2.3 G/DL (ref 3.2–4.9)
ALBUMIN/GLOB SERPL: 0.6 G/DL
ALP SERPL-CCNC: 56 U/L (ref 30–99)
ALT SERPL-CCNC: 7 U/L (ref 2–50)
ANION GAP SERPL CALC-SCNC: 4 MMOL/L (ref 7–16)
AST SERPL-CCNC: 23 U/L (ref 12–45)
BILIRUB SERPL-MCNC: 0.2 MG/DL (ref 0.1–1.5)
BUN SERPL-MCNC: 6 MG/DL (ref 8–22)
CALCIUM SERPL-MCNC: 8.5 MG/DL (ref 8.4–10.2)
CHLORIDE SERPL-SCNC: 107 MMOL/L (ref 96–112)
CO2 SERPL-SCNC: 28 MMOL/L (ref 20–33)
CREAT SERPL-MCNC: 0.89 MG/DL (ref 0.5–1.4)
ERYTHROCYTE [DISTWIDTH] IN BLOOD BY AUTOMATED COUNT: 55.5 FL (ref 35.9–50)
GLOBULIN SER CALC-MCNC: 3.8 G/DL (ref 1.9–3.5)
GLUCOSE BLD-MCNC: 113 MG/DL (ref 65–99)
GLUCOSE SERPL-MCNC: 145 MG/DL (ref 65–99)
HCT VFR BLD AUTO: 38.7 % (ref 42–52)
HGB BLD-MCNC: 11.5 G/DL (ref 14–18)
MCH RBC QN AUTO: 24.7 PG (ref 27–33)
MCHC RBC AUTO-ENTMCNC: 29.7 G/DL (ref 33.7–35.3)
MCV RBC AUTO: 83 FL (ref 81.4–97.8)
PHOSPHATE SERPL-MCNC: 3.1 MG/DL (ref 2.5–4.5)
PLATELET # BLD AUTO: 652 K/UL (ref 164–446)
PMV BLD AUTO: 10.9 FL (ref 9–12.9)
POTASSIUM SERPL-SCNC: 4.1 MMOL/L (ref 3.6–5.5)
PROT SERPL-MCNC: 6.1 G/DL (ref 6–8.2)
RBC # BLD AUTO: 4.66 M/UL (ref 4.7–6.1)
SODIUM SERPL-SCNC: 139 MMOL/L (ref 135–145)
WBC # BLD AUTO: 6.8 K/UL (ref 4.8–10.8)

## 2020-08-09 PROCEDURE — A9270 NON-COVERED ITEM OR SERVICE: HCPCS | Performed by: INTERNAL MEDICINE

## 2020-08-09 PROCEDURE — A9270 NON-COVERED ITEM OR SERVICE: HCPCS | Performed by: NURSE PRACTITIONER

## 2020-08-09 PROCEDURE — 82962 GLUCOSE BLOOD TEST: CPT

## 2020-08-09 PROCEDURE — 36415 COLL VENOUS BLD VENIPUNCTURE: CPT

## 2020-08-09 PROCEDURE — 700111 HCHG RX REV CODE 636 W/ 250 OVERRIDE (IP): Performed by: INTERNAL MEDICINE

## 2020-08-09 PROCEDURE — 80053 COMPREHEN METABOLIC PANEL: CPT

## 2020-08-09 PROCEDURE — 84100 ASSAY OF PHOSPHORUS: CPT

## 2020-08-09 PROCEDURE — 700102 HCHG RX REV CODE 250 W/ 637 OVERRIDE(OP): Performed by: HOSPITALIST

## 2020-08-09 PROCEDURE — 700102 HCHG RX REV CODE 250 W/ 637 OVERRIDE(OP): Performed by: NURSE PRACTITIONER

## 2020-08-09 PROCEDURE — 700102 HCHG RX REV CODE 250 W/ 637 OVERRIDE(OP): Performed by: INTERNAL MEDICINE

## 2020-08-09 PROCEDURE — 700105 HCHG RX REV CODE 258: Performed by: HOSPITALIST

## 2020-08-09 PROCEDURE — 700105 HCHG RX REV CODE 258: Performed by: INTERNAL MEDICINE

## 2020-08-09 PROCEDURE — A9270 NON-COVERED ITEM OR SERVICE: HCPCS | Performed by: HOSPITALIST

## 2020-08-09 PROCEDURE — 85027 COMPLETE CBC AUTOMATED: CPT

## 2020-08-09 PROCEDURE — 99232 SBSQ HOSP IP/OBS MODERATE 35: CPT | Performed by: HOSPITALIST

## 2020-08-09 PROCEDURE — 700111 HCHG RX REV CODE 636 W/ 250 OVERRIDE (IP): Performed by: NURSE PRACTITIONER

## 2020-08-09 PROCEDURE — 770006 HCHG ROOM/CARE - MED/SURG/GYN SEMI*

## 2020-08-09 RX ORDER — GABAPENTIN 100 MG/1
200 CAPSULE ORAL 3 TIMES DAILY
Status: DISCONTINUED | OUTPATIENT
Start: 2020-08-09 | End: 2020-08-10

## 2020-08-09 RX ORDER — OXYCODONE HYDROCHLORIDE 5 MG/1
5 TABLET ORAL EVERY 4 HOURS PRN
Status: DISCONTINUED | OUTPATIENT
Start: 2020-08-09 | End: 2020-08-15

## 2020-08-09 RX ORDER — AZATHIOPRINE 50 MG/1
50 TABLET ORAL 2 TIMES DAILY
Status: DISCONTINUED | OUTPATIENT
Start: 2020-08-09 | End: 2020-08-19

## 2020-08-09 RX ORDER — OXYCODONE HYDROCHLORIDE 10 MG/1
10 TABLET ORAL EVERY 4 HOURS PRN
Status: DISCONTINUED | OUTPATIENT
Start: 2020-08-09 | End: 2020-08-15

## 2020-08-09 RX ADMIN — OXYCODONE HYDROCHLORIDE 10 MG: 5 TABLET ORAL at 08:52

## 2020-08-09 RX ADMIN — MORPHINE SULFATE 15 MG: 15 TABLET, EXTENDED RELEASE ORAL at 17:26

## 2020-08-09 RX ADMIN — ASPIRIN 81 MG: 81 TABLET, COATED ORAL at 05:43

## 2020-08-09 RX ADMIN — OXYCODONE HYDROCHLORIDE 15 MG: 5 TABLET ORAL at 00:53

## 2020-08-09 RX ADMIN — OXYCODONE HYDROCHLORIDE 5 MG: 5 TABLET ORAL at 15:57

## 2020-08-09 RX ADMIN — MORPHINE SULFATE 4 MG: 4 INJECTION INTRAVENOUS at 13:28

## 2020-08-09 RX ADMIN — SODIUM CHLORIDE: 9 INJECTION, SOLUTION INTRAVENOUS at 00:00

## 2020-08-09 RX ADMIN — APIXABAN 5 MG: 5 TABLET, FILM COATED ORAL at 17:27

## 2020-08-09 RX ADMIN — OXYCODONE HYDROCHLORIDE 10 MG: 5 TABLET ORAL at 20:33

## 2020-08-09 RX ADMIN — APIXABAN 5 MG: 5 TABLET, FILM COATED ORAL at 05:43

## 2020-08-09 RX ADMIN — OXYCODONE HYDROCHLORIDE 10 MG: 5 TABLET ORAL at 15:56

## 2020-08-09 RX ADMIN — THERA TABS 1 TABLET: TAB at 05:43

## 2020-08-09 RX ADMIN — AZATHIOPRINE 50 MG: 50 TABLET ORAL at 19:14

## 2020-08-09 RX ADMIN — POTASSIUM CHLORIDE 40 MEQ: 1500 TABLET, EXTENDED RELEASE ORAL at 05:43

## 2020-08-09 RX ADMIN — MORPHINE SULFATE 15 MG: 15 TABLET, EXTENDED RELEASE ORAL at 05:43

## 2020-08-09 RX ADMIN — OXYCODONE HYDROCHLORIDE 5 MG: 5 TABLET ORAL at 11:45

## 2020-08-09 RX ADMIN — HYDROXYUREA 500 MG: 500 CAPSULE ORAL at 20:33

## 2020-08-09 RX ADMIN — AMPICILLIN SODIUM AND SULBACTAM SODIUM 3 G: 2; 1 INJECTION, POWDER, FOR SOLUTION INTRAMUSCULAR; INTRAVENOUS at 11:45

## 2020-08-09 RX ADMIN — SENNOSIDES-DOCUSATE SODIUM TAB 8.6-50 MG 2 TABLET: 8.6-5 TAB at 05:43

## 2020-08-09 RX ADMIN — OXYCODONE HYDROCHLORIDE 5 MG: 5 TABLET ORAL at 20:33

## 2020-08-09 RX ADMIN — AMPICILLIN SODIUM AND SULBACTAM SODIUM 3 G: 2; 1 INJECTION, POWDER, FOR SOLUTION INTRAMUSCULAR; INTRAVENOUS at 05:44

## 2020-08-09 RX ADMIN — OMEPRAZOLE 20 MG: 20 CAPSULE, DELAYED RELEASE ORAL at 05:43

## 2020-08-09 RX ADMIN — MORPHINE SULFATE 4 MG: 4 INJECTION INTRAVENOUS at 17:25

## 2020-08-09 RX ADMIN — SENNOSIDES-DOCUSATE SODIUM TAB 8.6-50 MG 2 TABLET: 8.6-5 TAB at 17:26

## 2020-08-09 RX ADMIN — AMPICILLIN SODIUM AND SULBACTAM SODIUM 3 G: 2; 1 INJECTION, POWDER, FOR SOLUTION INTRAMUSCULAR; INTRAVENOUS at 17:25

## 2020-08-09 RX ADMIN — GABAPENTIN 200 MG: 100 CAPSULE ORAL at 17:26

## 2020-08-09 ASSESSMENT — ENCOUNTER SYMPTOMS
GASTROINTESTINAL NEGATIVE: 1
CARDIOVASCULAR NEGATIVE: 1
WEAKNESS: 1
MYALGIAS: 1
EYES NEGATIVE: 1
RESPIRATORY NEGATIVE: 1
FEVER: 0
CHILLS: 0

## 2020-08-09 NOTE — PROGRESS NOTES
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 Schulte is a 69 y.o. male with a PMhx of CAD s/p PCI, pAF on eliquis, HTN, SLE on imuran & prednisone, polycytemia on hydroxyurea,who presented 7/25/2020 due to  recurrent rash on legs and buttocks followed by dermatology, recurrent cysts on his back needing I&D, Type II DM not well controlled insulin dependent (A1c 8.6%), has had a rash on his inner thigh that he is following up with Dermatology. Required insulin drip on admission.  Patient admitted for further evaluation and treatment. On 7/25: I&D right hemiscrotum  (Dr Power), 7/29: Further debridement (Dr. Viera)  7/31: Further debridement with wound vac placement (Dr Rosales), 8/3: Wound vac changes under anesthesia (Dr Rosales).        Interval Problem Update  -Patient seen and examined today. Patient reports better control of pain with MS Contin. Discussed with patient transfer to either an LTAC or SNF d/t needs of IV pain medication during wound chagnes. Patient aware in POC.  -POC: Wound changed recently on 8/7; continue antibiotic therapy per ID recommendation ; pain control - added MC Contin; wound care; PT/OT; place referral to LTAC as patient needing IV pain medication during wound care; hydroxy urea restarted; added azathioprine today. Pending placement  -Lab work: Reviewed; unremarkable  -VSS at this time  -Ordered: CBC and CMP - need to check LFT d/t abx therapy  -No significant changes from my previous ROS or PE, please see my previous note for further details.    Consultants/Specialty  -Urology -Dr. Rosales  -Infectious disease -Dr. Clemente    Code Status  Full    Disposition  TBD    Review of Systems  Review of Systems   Constitutional: Positive for malaise/fatigue. Negative for chills and fever.   HENT: Negative.    Eyes: Negative.    Respiratory: Negative.     Cardiovascular: Negative.    Gastrointestinal: Negative.    Genitourinary: Positive for dysuria.   Musculoskeletal: Positive for myalgias.   Skin: Negative.    Neurological: Positive for weakness.        Physical Exam  Temp:  [36.6 °C (97.9 °F)-36.9 °C (98.4 °F)] 36.6 °C (97.9 °F)  Pulse:  [64-73] 64  Resp:  [17-20] 20  BP: (116-130)/(68-77) 116/68  SpO2:  [97 %-98 %] 98 %    Physical Exam  Vitals signs and nursing note reviewed.   Constitutional:       Appearance: He is ill-appearing.   HENT:      Head: Normocephalic.      Nose: Nose normal.      Mouth/Throat:      Mouth: Mucous membranes are moist.   Eyes:      Pupils: Pupils are equal, round, and reactive to light.   Neck:      Musculoskeletal: Neck supple.   Cardiovascular:      Rate and Rhythm: Normal rate and regular rhythm.      Pulses: Normal pulses.      Heart sounds: Normal heart sounds.   Pulmonary:      Effort: Pulmonary effort is normal.   Abdominal:      General: Bowel sounds are normal.   Genitourinary:     Penis: Normal.       Comments: Scrotal swelling; wound vac in place  Musculoskeletal: Normal range of motion.   Skin:     General: Skin is warm.      Capillary Refill: Capillary refill takes 2 to 3 seconds.   Neurological:      Mental Status: He is alert. Mental status is at baseline.         Fluids    Intake/Output Summary (Last 24 hours) at 8/9/2020 1150  Last data filed at 8/9/2020 0800  Gross per 24 hour   Intake 2992 ml   Output 3200 ml   Net -208 ml       Laboratory  Recent Labs     08/07/20 0410 08/08/20  0420 08/09/20  0252   WBC 6.9 8.0 6.8   RBC 4.67* 4.56* 4.66*   HEMOGLOBIN 11.6* 11.4* 11.5*   HEMATOCRIT 39.2* 37.8* 38.7*   MCV 83.9 82.9 83.0   MCH 24.8* 25.0* 24.7*   MCHC 29.6* 30.2* 29.7*   RDW 55.8* 54.8* 55.5*   PLATELETCT 616* 640* 652*   MPV 10.6 10.7 10.9     Recent Labs     08/07/20  0410 08/08/20  0420 08/09/20  0252   SODIUM 138 136 139   POTASSIUM 3.8 3.5* 4.1   CHLORIDE 106 105 107   CO2 25 27 28   GLUCOSE 193* 136* 145*    BUN 6* 4* 6*   CREATININE 0.75 0.77 0.89   CALCIUM 8.4 8.2* 8.5                   Imaging  IR-MIDLINE CATHETER INSERTION WO GUIDANCE > AGE 3   Final Result                  Ultrasound-guided midline placement performed by qualified nursing staff    as above.          DX-CHEST-PORTABLE (1 VIEW)   Final Result         1. No acute cardiopulmonary abnormalities are identified.      CT-ABDOMEN-PELVIS WITH   Final Result      1.  Diffuse scrotal edema, eccentric to the RIGHT with extensive soft tissue gas present particularly in the RIGHT groin and hemiscrotum, consistent with Landon's gangrene.   2.  No discrete drainable abscess demonstrated.   3.  Minimal retroperitoneal adenopathy, nonspecific.   4.  Nonobstructing LEFT kidney stone.   5.  Splenomegaly.   6.  Cystic lesion in the pancreatic tail measuring 19 mm.  Neoplasm is not excluded.  Consider further evaluation with MRI.      These findings were discussed with AYALA HAMPTON on 7/25/2020 3:41 PM.      DX-CHEST-PORTABLE (1 VIEW)   Final Result      1.  There is no acute cardiopulmonary process.           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 Clemente: Continue unasyn and fluconazole-2-week antibiotic course post last surgery (end 8/17/20) patient is discharged home can transition Unasyn to Augmentin 875/125 twice daily  --- Noted diptheroid growth but will not specifically target as likely commensal and pt is improving on current regimen   --- Monitor labs-evaluate LFTs every few days as patient is on fluconazole  --- Limit immunosuppression as much as possible going forward, particularly for the next few months while recovering from infection   --- Monitor and control blood sugars to aid in healing and prevention of infection   --- Aggressive wound care and wound vac per surgery    -Wound VAC in place; placed on 8/3 with urology, Dr. Rosales;wound dressing changed on 8/7  -Pain  control  -Wound care  -Ordered PT/OT  -Referred SNF placement - pending placement and acceptance    CAD (coronary artery disease)- (present on admission)  Assessment & Plan  No acute events      Type 2 diabetes mellitus, with long-term current use of insulin (HCC)- (present on admission)  Assessment & Plan  -Lantus 22 units- whs  -SSI      Paroxysmal A-fib (HCC)- (present on admission)  Assessment & Plan  -Hold eliquis secondary to interventions  -Rate controlled      Debility  Assessment & Plan  -Patient deconditioned status   -Has been refusing PT/OT due to pain from wound  -Extensive RIGHT scrotal would - wound vac in place    Pancreatic cyst  Assessment & Plan  Follow up with imaging as outpatient      SLE (systemic lupus erythematosus related syndrome) (HCC)  Assessment & Plan  Prednisone on hold secondary to infection - watch for issues with adrenal insuffiencey         VTE prophylaxis: SCDs  ==============================================================================================================================  Please note that this dictation was created using voice recognition software. I have made every reasonable attempt to correct obvious errors, but there may be errors of grammar and possibly content that I did not discover before finalizing the note.    Electronically signed by:  RASHEL Mari, MSN, APRN, FNP-C  Hospitalist Services  [facility]  [phone]  [email or web address]  08/09/20    [id number]

## 2020-08-09 NOTE — CARE PLAN
Problem: Pain Management  Goal: Pain level will decrease to patient's comfort goal  Outcome: PROGRESSING AS EXPECTED  Intervention: Follow pain managment plan developed in collaboration with patient and Interdisciplinary Team  Note: Pain controlled with analgesics.     Problem: Communication  Goal: The ability to communicate needs accurately and effectively will improve  Outcome: PROGRESSING AS EXPECTED  Intervention: [place] patient and significant other/support system to call light to alert staff of needs  Note: Pt uses call light appropriately for needs.       Problem: Safety  Goal: Will remain free from falls  Outcome: PROGRESSING AS EXPECTED  Intervention: Implement fall precautions  Flowsheets (Taken 8/8/2020 2045)  Environmental Precautions:   Treaded Slipper Socks on Patient   Personal Belongings, Wastebasket, Call Bell etc. in Easy Reach   Transferred to Stronger Side   Report Given to Other Health Care Providers Regarding Fall Risk   Bed in Low Position   Communication Sign for Patients & Families   Mobility Assessed & Appropriate Sign Placed

## 2020-08-09 NOTE — DISCHARGE PLANNING
Anticipated Discharge Disposition: Possible LTAC placement    Action: Patient accepted at SNFs, but also LTAC referral sent out. Patient can go to SNF, if he can transition to oral pain medication. If not, LTAC would be able to accommodate this. However, LTAC still has not confirmed acceptance.     Per rounds, patient is on oral pain medication. Wound change tomorrow. We will see how he is tolerating it.    Barriers to Discharge: IV or oral pain medications and placement facility that accommodate both.    Plan: Care coordination to follow. Inpatient/Medicare status: IMM at d/c.

## 2020-08-09 NOTE — PROGRESS NOTES
Received report, assumed pt care. Discussed POC. Pt states pain management has improved, since starting MS contin. VSS. Will cont to monitor.

## 2020-08-09 NOTE — PROGRESS NOTES
A&Ox4, VSS, pt states pain is tolerable at 5/10. POC discussed, denies further needs at this time. Safety measures and hourly rounding in place.

## 2020-08-10 LAB
ALBUMIN SERPL BCP-MCNC: 2.2 G/DL (ref 3.2–4.9)
BUN SERPL-MCNC: 7 MG/DL (ref 8–22)
CALCIUM SERPL-MCNC: 8.5 MG/DL (ref 8.4–10.2)
CHLORIDE SERPL-SCNC: 105 MMOL/L (ref 96–112)
CO2 SERPL-SCNC: 24 MMOL/L (ref 20–33)
CREAT SERPL-MCNC: 0.7 MG/DL (ref 0.5–1.4)
ERYTHROCYTE [DISTWIDTH] IN BLOOD BY AUTOMATED COUNT: 55.2 FL (ref 35.9–50)
GLUCOSE BLD-MCNC: 106 MG/DL (ref 65–99)
GLUCOSE BLD-MCNC: 145 MG/DL (ref 65–99)
GLUCOSE BLD-MCNC: 194 MG/DL (ref 65–99)
GLUCOSE SERPL-MCNC: 126 MG/DL (ref 65–99)
HCT VFR BLD AUTO: 39.9 % (ref 42–52)
HGB BLD-MCNC: 11.9 G/DL (ref 14–18)
MCH RBC QN AUTO: 24.7 PG (ref 27–33)
MCHC RBC AUTO-ENTMCNC: 29.8 G/DL (ref 33.7–35.3)
MCV RBC AUTO: 83 FL (ref 81.4–97.8)
PHOSPHATE SERPL-MCNC: 3 MG/DL (ref 2.5–4.5)
PLATELET # BLD AUTO: 702 K/UL (ref 164–446)
PMV BLD AUTO: 10.9 FL (ref 9–12.9)
POTASSIUM SERPL-SCNC: 3.6 MMOL/L (ref 3.6–5.5)
RBC # BLD AUTO: 4.81 M/UL (ref 4.7–6.1)
SODIUM SERPL-SCNC: 138 MMOL/L (ref 135–145)
WBC # BLD AUTO: 7.5 K/UL (ref 4.8–10.8)

## 2020-08-10 PROCEDURE — 770006 HCHG ROOM/CARE - MED/SURG/GYN SEMI*

## 2020-08-10 PROCEDURE — 94640 AIRWAY INHALATION TREATMENT: CPT

## 2020-08-10 PROCEDURE — 80069 RENAL FUNCTION PANEL: CPT

## 2020-08-10 PROCEDURE — 36415 COLL VENOUS BLD VENIPUNCTURE: CPT

## 2020-08-10 PROCEDURE — 700105 HCHG RX REV CODE 258: Performed by: INTERNAL MEDICINE

## 2020-08-10 PROCEDURE — A9270 NON-COVERED ITEM OR SERVICE: HCPCS | Performed by: NURSE PRACTITIONER

## 2020-08-10 PROCEDURE — 94760 N-INVAS EAR/PLS OXIMETRY 1: CPT

## 2020-08-10 PROCEDURE — 82962 GLUCOSE BLOOD TEST: CPT | Mod: 91

## 2020-08-10 PROCEDURE — 700102 HCHG RX REV CODE 250 W/ 637 OVERRIDE(OP): Performed by: HOSPITALIST

## 2020-08-10 PROCEDURE — 85027 COMPLETE CBC AUTOMATED: CPT

## 2020-08-10 PROCEDURE — 99232 SBSQ HOSP IP/OBS MODERATE 35: CPT | Performed by: HOSPITALIST

## 2020-08-10 PROCEDURE — 700102 HCHG RX REV CODE 250 W/ 637 OVERRIDE(OP): Performed by: INTERNAL MEDICINE

## 2020-08-10 PROCEDURE — A9270 NON-COVERED ITEM OR SERVICE: HCPCS | Performed by: INTERNAL MEDICINE

## 2020-08-10 PROCEDURE — 700102 HCHG RX REV CODE 250 W/ 637 OVERRIDE(OP): Performed by: NURSE PRACTITIONER

## 2020-08-10 PROCEDURE — 700111 HCHG RX REV CODE 636 W/ 250 OVERRIDE (IP): Performed by: INTERNAL MEDICINE

## 2020-08-10 PROCEDURE — 700111 HCHG RX REV CODE 636 W/ 250 OVERRIDE (IP): Performed by: NURSE PRACTITIONER

## 2020-08-10 PROCEDURE — A9270 NON-COVERED ITEM OR SERVICE: HCPCS | Performed by: HOSPITALIST

## 2020-08-10 PROCEDURE — 700101 HCHG RX REV CODE 250: Performed by: INTERNAL MEDICINE

## 2020-08-10 PROCEDURE — 700105 HCHG RX REV CODE 258: Performed by: HOSPITALIST

## 2020-08-10 PROCEDURE — 97606 NEG PRS WND THER DME>50 SQCM: CPT

## 2020-08-10 RX ORDER — GABAPENTIN 300 MG/1
300 CAPSULE ORAL 3 TIMES DAILY
Status: DISCONTINUED | OUTPATIENT
Start: 2020-08-10 | End: 2020-08-12

## 2020-08-10 RX ORDER — MICONAZOLE NITRATE 20 MG/G
CREAM TOPICAL PRN
Status: DISCONTINUED | OUTPATIENT
Start: 2020-08-10 | End: 2020-08-22 | Stop reason: HOSPADM

## 2020-08-10 RX ADMIN — AMPICILLIN SODIUM AND SULBACTAM SODIUM 3 G: 2; 1 INJECTION, POWDER, FOR SOLUTION INTRAMUSCULAR; INTRAVENOUS at 23:40

## 2020-08-10 RX ADMIN — LORAZEPAM 1 MG: 2 INJECTION INTRAMUSCULAR; INTRAVENOUS at 15:10

## 2020-08-10 RX ADMIN — HYDROXYUREA 500 MG: 500 CAPSULE ORAL at 20:33

## 2020-08-10 RX ADMIN — OXYCODONE HYDROCHLORIDE 10 MG: 5 TABLET ORAL at 12:18

## 2020-08-10 RX ADMIN — OXYCODONE HYDROCHLORIDE 10 MG: 5 TABLET ORAL at 23:39

## 2020-08-10 RX ADMIN — SENNOSIDES-DOCUSATE SODIUM TAB 8.6-50 MG 2 TABLET: 8.6-5 TAB at 06:24

## 2020-08-10 RX ADMIN — LIDOCAINE HYDROCHLORIDE 20 ML: 20 INJECTION, SOLUTION INFILTRATION; PERINEURAL at 15:11

## 2020-08-10 RX ADMIN — GABAPENTIN 300 MG: 300 CAPSULE ORAL at 17:40

## 2020-08-10 RX ADMIN — AMPICILLIN SODIUM AND SULBACTAM SODIUM 3 G: 2; 1 INJECTION, POWDER, FOR SOLUTION INTRAMUSCULAR; INTRAVENOUS at 06:22

## 2020-08-10 RX ADMIN — SODIUM CHLORIDE: 9 INJECTION, SOLUTION INTRAVENOUS at 00:22

## 2020-08-10 RX ADMIN — INSULIN LISPRO 3 UNITS: 100 INJECTION, SOLUTION INTRAVENOUS; SUBCUTANEOUS at 11:13

## 2020-08-10 RX ADMIN — MORPHINE SULFATE 15 MG: 15 TABLET, EXTENDED RELEASE ORAL at 17:41

## 2020-08-10 RX ADMIN — MORPHINE SULFATE 2 MG: 4 INJECTION INTRAVENOUS at 20:29

## 2020-08-10 RX ADMIN — OXYCODONE HYDROCHLORIDE 10 MG: 5 TABLET ORAL at 08:24

## 2020-08-10 RX ADMIN — HYDROMORPHONE HYDROCHLORIDE 1 MG: 1 INJECTION, SOLUTION INTRAMUSCULAR; INTRAVENOUS; SUBCUTANEOUS at 15:10

## 2020-08-10 RX ADMIN — AMPICILLIN SODIUM AND SULBACTAM SODIUM 3 G: 2; 1 INJECTION, POWDER, FOR SOLUTION INTRAMUSCULAR; INTRAVENOUS at 17:44

## 2020-08-10 RX ADMIN — MICONAZOLE NITRATE: 20 CREAM TOPICAL at 23:43

## 2020-08-10 RX ADMIN — AMPICILLIN SODIUM AND SULBACTAM SODIUM 3 G: 2; 1 INJECTION, POWDER, FOR SOLUTION INTRAMUSCULAR; INTRAVENOUS at 00:22

## 2020-08-10 RX ADMIN — GABAPENTIN 200 MG: 100 CAPSULE ORAL at 06:23

## 2020-08-10 RX ADMIN — APIXABAN 5 MG: 5 TABLET, FILM COATED ORAL at 06:23

## 2020-08-10 RX ADMIN — AMPICILLIN SODIUM AND SULBACTAM SODIUM 3 G: 2; 1 INJECTION, POWDER, FOR SOLUTION INTRAMUSCULAR; INTRAVENOUS at 11:18

## 2020-08-10 RX ADMIN — AZATHIOPRINE 50 MG: 50 TABLET ORAL at 17:43

## 2020-08-10 RX ADMIN — MORPHINE SULFATE 15 MG: 15 TABLET, EXTENDED RELEASE ORAL at 06:23

## 2020-08-10 RX ADMIN — AZATHIOPRINE 50 MG: 50 TABLET ORAL at 06:23

## 2020-08-10 RX ADMIN — MORPHINE SULFATE 4 MG: 4 INJECTION INTRAVENOUS at 15:10

## 2020-08-10 RX ADMIN — THERA TABS 1 TABLET: TAB at 06:23

## 2020-08-10 RX ADMIN — POTASSIUM CHLORIDE 40 MEQ: 1500 TABLET, EXTENDED RELEASE ORAL at 06:24

## 2020-08-10 RX ADMIN — INSULIN LISPRO 4 UNITS: 100 INJECTION, SOLUTION INTRAVENOUS; SUBCUTANEOUS at 11:15

## 2020-08-10 RX ADMIN — SODIUM CHLORIDE: 9 INJECTION, SOLUTION INTRAVENOUS at 15:22

## 2020-08-10 RX ADMIN — OMEPRAZOLE 20 MG: 20 CAPSULE, DELAYED RELEASE ORAL at 06:23

## 2020-08-10 RX ADMIN — MORPHINE SULFATE 2 MG: 4 INJECTION INTRAVENOUS at 15:01

## 2020-08-10 RX ADMIN — OXYCODONE HYDROCHLORIDE 10 MG: 5 TABLET ORAL at 18:28

## 2020-08-10 RX ADMIN — ASPIRIN 81 MG: 81 TABLET, COATED ORAL at 06:23

## 2020-08-10 RX ADMIN — ALBUTEROL SULFATE 2 PUFF: 90 AEROSOL, METERED RESPIRATORY (INHALATION) at 07:52

## 2020-08-10 RX ADMIN — GABAPENTIN 200 MG: 100 CAPSULE ORAL at 11:17

## 2020-08-10 RX ADMIN — APIXABAN 5 MG: 5 TABLET, FILM COATED ORAL at 17:40

## 2020-08-10 RX ADMIN — SENNOSIDES-DOCUSATE SODIUM TAB 8.6-50 MG 2 TABLET: 8.6-5 TAB at 17:41

## 2020-08-10 ASSESSMENT — ENCOUNTER SYMPTOMS
GASTROINTESTINAL NEGATIVE: 1
FEVER: 0
CHILLS: 0
MYALGIAS: 1
CARDIOVASCULAR NEGATIVE: 1
WEAKNESS: 1
EYES NEGATIVE: 1
RESPIRATORY NEGATIVE: 1

## 2020-08-10 NOTE — FLOWSHEET NOTE
08/10/20 0754   Events/Summary/Plan   Events/Summary/Plan Pt subj chest tightness. RT gave Albuterol MDI PRN to pt.    Skin Inspection Respiratory Device Intact   Vital Signs   Pulse 60   Respiration 18   Pulse Oximetry 95 %   $ Pulse Oximetry (Spot Check) Yes   Breath Sounds   RUL Breath Sounds Clear   RML Breath Sounds Clear   RLL Breath Sounds Diminished   EVELINA Breath Sounds Clear   LLL Breath Sounds Diminished   Oxygen   O2 (LPM) 2   O2 Delivery Device Nasal Cannula

## 2020-08-10 NOTE — PROGRESS NOTES
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 Schulte is a 69 y.o. male with a PMhx of CAD s/p PCI, pAF on eliquis, HTN, SLE on imuran & prednisone, polycytemia on hydroxyurea,who presented 7/25/2020 due to  recurrent rash on legs and buttocks followed by dermatology, recurrent cysts on his back needing I&D, Type II DM not well controlled insulin dependent (A1c 8.6%), has had a rash on his inner thigh that he is following up with Dermatology. Required insulin drip on admission.  Patient admitted for further evaluation and treatment. On 7/25: I&D right hemiscrotum  (Dr Power), 7/29: Further debridement (Dr. Viera)  7/31: Further debridement with wound vac placement (Dr Rosales), 8/3: Wound vac changes under anesthesia (Dr Rosales).  Pending placement to SNF vs LTAC as patient need IV pain medication during dressing changes.      Interval Problem Update  -Patient seen and examined today.  Patient reports feeling better today.  Patient also reports doing recommended physical therapy in bed.  Anticipated wound change today with wound team.  Encourage patient to utilize pain regimen in place.  Patient aware and POC.  -POC: Wound dressing changed today; continue antibiotic therapy per ID recommendation; pain control - added MC Contin; wound care; PT/OT; place referral to LTAC as patient needing IV pain medication during wound care; hydroxy urea restarted; added azathioprine today. Pending placement to LTAC versus SNF  -Lab work: Reviewed; unremarkable  -VSS at this time  -Ordered: CBC and CMP - need to check LFT d/t abx therapy  -No significant changes from my previous ROS or PE, please see my previous note for further details.    Consultants/Specialty  -Urology -Dr. Rosales  -Infectious disease -Dr. Clemente    Code Status  Full    Disposition  TBD    Review of Systems  Review of  Systems   Constitutional: Positive for malaise/fatigue. Negative for chills and fever.   HENT: Negative.    Eyes: Negative.    Respiratory: Negative.    Cardiovascular: Negative.    Gastrointestinal: Negative.    Genitourinary: Positive for dysuria.   Musculoskeletal: Positive for myalgias.   Skin: Negative.    Neurological: Positive for weakness.        Physical Exam  Temp:  [36.8 °C (98.3 °F)-37.2 °C (98.9 °F)] 36.9 °C (98.5 °F)  Pulse:  [60-72] 70  Resp:  [18-20] 18  BP: (118-131)/(66-87) 127/87  SpO2:  [95 %-97 %] 96 %    Physical Exam  Vitals signs and nursing note reviewed.   Constitutional:       Appearance: He is ill-appearing.   HENT:      Head: Normocephalic.      Nose: Nose normal.      Mouth/Throat:      Mouth: Mucous membranes are moist.   Eyes:      Pupils: Pupils are equal, round, and reactive to light.   Neck:      Musculoskeletal: Neck supple.   Cardiovascular:      Rate and Rhythm: Normal rate and regular rhythm.      Pulses: Normal pulses.      Heart sounds: Normal heart sounds.   Pulmonary:      Effort: Pulmonary effort is normal.   Abdominal:      General: Bowel sounds are normal.   Genitourinary:     Penis: Normal.       Comments: Scrotal swelling; wound vac in place  Musculoskeletal: Normal range of motion.   Skin:     General: Skin is warm.      Capillary Refill: Capillary refill takes 2 to 3 seconds.   Neurological:      Mental Status: He is alert. Mental status is at baseline.         Fluids    Intake/Output Summary (Last 24 hours) at 8/10/2020 1149  Last data filed at 8/10/2020 0700  Gross per 24 hour   Intake 2632 ml   Output 4050 ml   Net -1418 ml       Laboratory  Recent Labs     08/08/20  0420 08/09/20  0252 08/10/20  0359   WBC 8.0 6.8 7.5   RBC 4.56* 4.66* 4.81   HEMOGLOBIN 11.4* 11.5* 11.9*   HEMATOCRIT 37.8* 38.7* 39.9*   MCV 82.9 83.0 83.0   MCH 25.0* 24.7* 24.7*   MCHC 30.2* 29.7* 29.8*   RDW 54.8* 55.5* 55.2*   PLATELETCT 640* 652* 702*   MPV 10.7 10.9 10.9     Recent Labs      08/08/20  0420 08/09/20  0252 08/10/20  0359   SODIUM 136 139 138   POTASSIUM 3.5* 4.1 3.6   CHLORIDE 105 107 105   CO2 27 28 24   GLUCOSE 136* 145* 126*   BUN 4* 6* 7*   CREATININE 0.77 0.89 0.70   CALCIUM 8.2* 8.5 8.5                   Imaging  IR-MIDLINE CATHETER INSERTION WO GUIDANCE > AGE 3   Final Result                  Ultrasound-guided midline placement performed by qualified nursing staff    as above.          DX-CHEST-PORTABLE (1 VIEW)   Final Result         1. No acute cardiopulmonary abnormalities are identified.      CT-ABDOMEN-PELVIS WITH   Final Result      1.  Diffuse scrotal edema, eccentric to the RIGHT with extensive soft tissue gas present particularly in the RIGHT groin and hemiscrotum, consistent with Landon's gangrene.   2.  No discrete drainable abscess demonstrated.   3.  Minimal retroperitoneal adenopathy, nonspecific.   4.  Nonobstructing LEFT kidney stone.   5.  Splenomegaly.   6.  Cystic lesion in the pancreatic tail measuring 19 mm.  Neoplasm is not excluded.  Consider further evaluation with MRI.      These findings were discussed with AYALA HAMPTON on 7/25/2020 3:41 PM.      DX-CHEST-PORTABLE (1 VIEW)   Final Result      1.  There is no acute cardiopulmonary process.           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 Bryn MYERS: Continue unasyn and fluconazole-2-week antibiotic course post last surgery (end 8/17/20) patient is discharged home can transition Unasyn to Augmentin 875/125 twice daily  --- Noted diptheroid growth but will not specifically target as likely commensal and pt is improving on current regimen   --- Monitor labs-evaluate LFTs every few days as patient is on fluconazole  --- Limit immunosuppression as much as possible going forward, particularly for the next few months while recovering from infection   --- Monitor and control blood sugars to aid in healing and prevention of infection   ---  Aggressive wound care and wound vac per surgery    -Wound VAC in place; placed on 8/3 with urology, Dr. Rosales;wound dressing changed on 8/10  -Pain control  -Wound care  -Ordered PT/OT  -Referral SNF placement - pending placement and acceptance  -Referral to LTAC placement also -due to IV pain control during wound changes    CAD (coronary artery disease)- (present on admission)  Assessment & Plan  No acute events      Type 2 diabetes mellitus, with long-term current use of insulin ([facility])- (present on admission)  Assessment & Plan  -Lantus 22 units- whs  -SSI      Paroxysmal A-fib ([facility])- (present on admission)  Assessment & Plan  -Hold eliquis secondary to interventions  -Rate controlled      Debility  Assessment & Plan  -Patient deconditioned status   -Has been refusing PT/OT due to pain from wound  -Extensive RIGHT scrotal would - wound vac in place    Pancreatic cyst  Assessment & Plan  Follow up with imaging as outpatient      SLE (systemic lupus erythematosus related syndrome) ([facility])  Assessment & Plan  Prednisone on hold secondary to infection - watch for issues with adrenal insuffiencey         VTE prophylaxis: SCDs  ==============================================================================================================================  Please note that this dictation was created using voice recognition software. I have made every reasonable attempt to correct obvious errors, but there may be errors of grammar and possibly content that I did not discover before finalizing the note.    Electronically signed by:  RASHEL Mari, MSN, APRN, FNP-C  Hospitalist Services  [facility]  [phone]  [email or web address]  08/10/20    0414

## 2020-08-10 NOTE — DISCHARGE PLANNING
Agency/Facility Name: [facility]   Spoke To: Patricia   Outcome: Per Patricia pt is accepted, no beds are available today, advises that [facility] call after 0900 tomorrow for f/u on availability.

## 2020-08-10 NOTE — PROGRESS NOTES
"Bedside report received from night RN. Assumed care of patient. Daily plan of care discussed. Pt awake and alert, requesting visit from respiratory therapy this AM for \"difficulty breathing.\" SpO2 recorded at 96%. Hourly rounding in place.    "

## 2020-08-10 NOTE — CARE PLAN
"  Problem: Pain Management  Goal: Pain level will decrease to patient's comfort goal  Outcome: PROGRESSING AS EXPECTED  Intervention: Follow pain managment plan developed in collaboration with patient and Interdisciplinary Team  Note: Pt education given re: pain management plan and prn pain medications. Pt verbalized understanding. Pt verbalizes adequate pain relief with administration of prn medications but reports only barely tolerable pain levels with dressing changes.     Problem: Mobility  Goal: Risk for activity intolerance will decrease  Outcome: PROGRESSING AS EXPECTED  Intervention: Encourage patient to increase activity level in collaboration with Interdisciplinary Team  Note: Pt reports ability to mobilize with \"donut cushion,\" reports doing regular exercises while in bed. No s/sx of activity intolerance observed at this time.     "

## 2020-08-11 LAB
ALBUMIN SERPL BCP-MCNC: 2.3 G/DL (ref 3.2–4.9)
ALBUMIN/GLOB SERPL: 0.6 G/DL
ALP SERPL-CCNC: 64 U/L (ref 30–99)
ALT SERPL-CCNC: 7 U/L (ref 2–50)
ANION GAP SERPL CALC-SCNC: 10 MMOL/L (ref 7–16)
AST SERPL-CCNC: 18 U/L (ref 12–45)
BILIRUB SERPL-MCNC: 0.2 MG/DL (ref 0.1–1.5)
BUN SERPL-MCNC: 8 MG/DL (ref 8–22)
CALCIUM SERPL-MCNC: 8.4 MG/DL (ref 8.4–10.2)
CHLORIDE SERPL-SCNC: 103 MMOL/L (ref 96–112)
CO2 SERPL-SCNC: 24 MMOL/L (ref 20–33)
CREAT SERPL-MCNC: 0.77 MG/DL (ref 0.5–1.4)
ERYTHROCYTE [DISTWIDTH] IN BLOOD BY AUTOMATED COUNT: 55.9 FL (ref 35.9–50)
GLOBULIN SER CALC-MCNC: 4 G/DL (ref 1.9–3.5)
GLUCOSE BLD-MCNC: 125 MG/DL (ref 65–99)
GLUCOSE BLD-MCNC: 130 MG/DL (ref 65–99)
GLUCOSE BLD-MCNC: 153 MG/DL (ref 65–99)
GLUCOSE BLD-MCNC: 161 MG/DL (ref 65–99)
GLUCOSE SERPL-MCNC: 142 MG/DL (ref 65–99)
HCT VFR BLD AUTO: 38.9 % (ref 42–52)
HGB BLD-MCNC: 11.6 G/DL (ref 14–18)
MCH RBC QN AUTO: 25.1 PG (ref 27–33)
MCHC RBC AUTO-ENTMCNC: 29.8 G/DL (ref 33.7–35.3)
MCV RBC AUTO: 84.2 FL (ref 81.4–97.8)
PHOSPHATE SERPL-MCNC: 3.3 MG/DL (ref 2.5–4.5)
PLATELET # BLD AUTO: 688 K/UL (ref 164–446)
PMV BLD AUTO: 11.1 FL (ref 9–12.9)
POTASSIUM SERPL-SCNC: 4 MMOL/L (ref 3.6–5.5)
PROT SERPL-MCNC: 6.3 G/DL (ref 6–8.2)
RBC # BLD AUTO: 4.62 M/UL (ref 4.7–6.1)
SODIUM SERPL-SCNC: 137 MMOL/L (ref 135–145)
WBC # BLD AUTO: 8.6 K/UL (ref 4.8–10.8)

## 2020-08-11 PROCEDURE — 84100 ASSAY OF PHOSPHORUS: CPT

## 2020-08-11 PROCEDURE — 82962 GLUCOSE BLOOD TEST: CPT

## 2020-08-11 PROCEDURE — 700102 HCHG RX REV CODE 250 W/ 637 OVERRIDE(OP): Performed by: INTERNAL MEDICINE

## 2020-08-11 PROCEDURE — 700111 HCHG RX REV CODE 636 W/ 250 OVERRIDE (IP): Performed by: INTERNAL MEDICINE

## 2020-08-11 PROCEDURE — A9270 NON-COVERED ITEM OR SERVICE: HCPCS | Performed by: INTERNAL MEDICINE

## 2020-08-11 PROCEDURE — A9270 NON-COVERED ITEM OR SERVICE: HCPCS | Performed by: NURSE PRACTITIONER

## 2020-08-11 PROCEDURE — 700105 HCHG RX REV CODE 258: Performed by: INTERNAL MEDICINE

## 2020-08-11 PROCEDURE — 700111 HCHG RX REV CODE 636 W/ 250 OVERRIDE (IP): Performed by: NURSE PRACTITIONER

## 2020-08-11 PROCEDURE — 770006 HCHG ROOM/CARE - MED/SURG/GYN SEMI*

## 2020-08-11 PROCEDURE — 85027 COMPLETE CBC AUTOMATED: CPT

## 2020-08-11 PROCEDURE — 99233 SBSQ HOSP IP/OBS HIGH 50: CPT | Performed by: INTERNAL MEDICINE

## 2020-08-11 PROCEDURE — 700102 HCHG RX REV CODE 250 W/ 637 OVERRIDE(OP): Performed by: HOSPITALIST

## 2020-08-11 PROCEDURE — A9270 NON-COVERED ITEM OR SERVICE: HCPCS | Performed by: HOSPITALIST

## 2020-08-11 PROCEDURE — 700102 HCHG RX REV CODE 250 W/ 637 OVERRIDE(OP): Performed by: NURSE PRACTITIONER

## 2020-08-11 PROCEDURE — 700105 HCHG RX REV CODE 258: Performed by: HOSPITALIST

## 2020-08-11 PROCEDURE — 80053 COMPREHEN METABOLIC PANEL: CPT

## 2020-08-11 RX ORDER — FLUCONAZOLE 200 MG/1
200 TABLET ORAL DAILY
Status: COMPLETED | OUTPATIENT
Start: 2020-08-11 | End: 2020-08-17

## 2020-08-11 RX ADMIN — FLUCONAZOLE 200 MG: 200 TABLET ORAL at 10:00

## 2020-08-11 RX ADMIN — INSULIN LISPRO 4 UNITS: 100 INJECTION, SOLUTION INTRAVENOUS; SUBCUTANEOUS at 11:33

## 2020-08-11 RX ADMIN — OXYCODONE HYDROCHLORIDE 10 MG: 5 TABLET ORAL at 17:16

## 2020-08-11 RX ADMIN — MORPHINE SULFATE 15 MG: 15 TABLET, EXTENDED RELEASE ORAL at 05:33

## 2020-08-11 RX ADMIN — MORPHINE SULFATE 2 MG: 4 INJECTION INTRAVENOUS at 00:08

## 2020-08-11 RX ADMIN — OXYCODONE HYDROCHLORIDE 5 MG: 5 TABLET ORAL at 05:48

## 2020-08-11 RX ADMIN — APIXABAN 5 MG: 5 TABLET, FILM COATED ORAL at 05:33

## 2020-08-11 RX ADMIN — AMPICILLIN SODIUM AND SULBACTAM SODIUM 3 G: 2; 1 INJECTION, POWDER, FOR SOLUTION INTRAMUSCULAR; INTRAVENOUS at 17:30

## 2020-08-11 RX ADMIN — MORPHINE SULFATE 2 MG: 4 INJECTION INTRAVENOUS at 13:03

## 2020-08-11 RX ADMIN — HYDROXYUREA 500 MG: 500 CAPSULE ORAL at 20:00

## 2020-08-11 RX ADMIN — THERA TABS 1 TABLET: TAB at 05:33

## 2020-08-11 RX ADMIN — GABAPENTIN 300 MG: 300 CAPSULE ORAL at 11:31

## 2020-08-11 RX ADMIN — ASPIRIN 81 MG: 81 TABLET, COATED ORAL at 05:33

## 2020-08-11 RX ADMIN — SENNOSIDES-DOCUSATE SODIUM TAB 8.6-50 MG 2 TABLET: 8.6-5 TAB at 17:28

## 2020-08-11 RX ADMIN — AMPICILLIN SODIUM AND SULBACTAM SODIUM 3 G: 2; 1 INJECTION, POWDER, FOR SOLUTION INTRAMUSCULAR; INTRAVENOUS at 23:06

## 2020-08-11 RX ADMIN — AMPICILLIN SODIUM AND SULBACTAM SODIUM 3 G: 2; 1 INJECTION, POWDER, FOR SOLUTION INTRAMUSCULAR; INTRAVENOUS at 05:38

## 2020-08-11 RX ADMIN — MORPHINE SULFATE 15 MG: 15 TABLET, EXTENDED RELEASE ORAL at 17:28

## 2020-08-11 RX ADMIN — AZATHIOPRINE 50 MG: 50 TABLET ORAL at 17:29

## 2020-08-11 RX ADMIN — OXYCODONE HYDROCHLORIDE 10 MG: 5 TABLET ORAL at 03:36

## 2020-08-11 RX ADMIN — OMEPRAZOLE 20 MG: 20 CAPSULE, DELAYED RELEASE ORAL at 05:33

## 2020-08-11 RX ADMIN — APIXABAN 5 MG: 5 TABLET, FILM COATED ORAL at 17:28

## 2020-08-11 RX ADMIN — OXYCODONE HYDROCHLORIDE 10 MG: 5 TABLET ORAL at 21:15

## 2020-08-11 RX ADMIN — SENNOSIDES-DOCUSATE SODIUM TAB 8.6-50 MG 2 TABLET: 8.6-5 TAB at 05:33

## 2020-08-11 RX ADMIN — MORPHINE SULFATE 2 MG: 4 INJECTION INTRAVENOUS at 20:00

## 2020-08-11 RX ADMIN — MORPHINE SULFATE 2 MG: 4 INJECTION INTRAVENOUS at 13:45

## 2020-08-11 RX ADMIN — OXYCODONE HYDROCHLORIDE 10 MG: 5 TABLET ORAL at 11:31

## 2020-08-11 RX ADMIN — GABAPENTIN 300 MG: 300 CAPSULE ORAL at 17:28

## 2020-08-11 RX ADMIN — MORPHINE SULFATE 2 MG: 4 INJECTION INTRAVENOUS at 23:16

## 2020-08-11 RX ADMIN — AZATHIOPRINE 50 MG: 50 TABLET ORAL at 05:34

## 2020-08-11 RX ADMIN — SODIUM CHLORIDE: 9 INJECTION, SOLUTION INTRAVENOUS at 05:50

## 2020-08-11 RX ADMIN — GABAPENTIN 300 MG: 300 CAPSULE ORAL at 05:33

## 2020-08-11 RX ADMIN — INSULIN LISPRO 3 UNITS: 100 INJECTION, SOLUTION INTRAVENOUS; SUBCUTANEOUS at 11:32

## 2020-08-11 RX ADMIN — AMPICILLIN SODIUM AND SULBACTAM SODIUM 3 G: 2; 1 INJECTION, POWDER, FOR SOLUTION INTRAMUSCULAR; INTRAVENOUS at 11:32

## 2020-08-11 ASSESSMENT — ENCOUNTER SYMPTOMS
COUGH: 0
CHILLS: 0
BLOOD IN STOOL: 0
DIZZINESS: 0
BACK PAIN: 0
VOMITING: 0
HEADACHES: 0
NAUSEA: 0
DIARRHEA: 0
SHORTNESS OF BREATH: 0
WEAKNESS: 0
HALLUCINATIONS: 0
DEPRESSION: 0
NERVOUS/ANXIOUS: 1
ABDOMINAL PAIN: 0
FOCAL WEAKNESS: 0
MYALGIAS: 1
PALPITATIONS: 0
SORE THROAT: 0
FEVER: 0
HEARTBURN: 0

## 2020-08-11 NOTE — PROGRESS NOTES
Bedside report received from night RN. Assumed care of patient. Daily plan of care discussed. Pt resting comfortably in bed with no signs of distress noted at this time. Breathing even and unlabored. Wound vac remains in place with irrigation active. Andres catheter and overbed trapeze remain in place. Hourly rounding in place.

## 2020-08-11 NOTE — PROGRESS NOTES
[facility] Medicine Daily Progress Note    Date of Service  8/11/2020    Chief Complaint  69 y.o. male admitted 7/25/2020 with scrotal swelling and pain    Hospital Course    History of coronary artery disease status post stent in the past, type 2 diabetes, paroxysmal atrial fibrillation on Eliquis, polycythemia on hydroxyurea, SLE on Imuran, admitted for scrotal pain and swelling found to have a right scrotal abscess and Landon's gangrene.  Urology was emergently consulted and completed a washout on 725.  Since then, he has undergone 3 additional washouts on 7/29, 7/31, 8/3.  He has a wound VAC in place and is having significant discomfort with dressing changes.  Overall, infectious markers have improved.  Cultures grew Candida albicans therefore Diflucan has been added to his antibiotic regimen.      Interval Problem Update  8/11: Tolerating antibiotics, gabapentin is helping with shooting nerve pain.  Still requiring IV Ativan and Dilaudid prior to dressing changes.  Disposition pending, needs to work with PT OT.    Consultants/Specialty  Dr. kidd, urology    Code Status  Full Code    Disposition  SNF versus LTAC  Pending PT OT eval    Review of Systems  Review of Systems   Constitutional: Positive for malaise/fatigue (From narcotics). Negative for chills and fever.        Difficult to find comfortable position   HENT: Negative for hearing loss, sore throat and tinnitus.    Respiratory: Negative for cough and shortness of breath.    Cardiovascular: Negative for chest pain, palpitations and leg swelling.   Gastrointestinal: Negative for abdominal pain, blood in stool, diarrhea, heartburn, nausea and vomiting.   Genitourinary: Negative for dysuria, frequency and hematuria.        Shooting pain in R testicle, improved w gabapentin.   Musculoskeletal: Positive for myalgias. Negative for back pain and joint pain.   Neurological: Negative for dizziness, focal weakness, weakness and headaches.   Psychiatric/Behavioral:  "Negative for depression and hallucinations. The patient is nervous/anxious.    All other systems reviewed and are negative.       Physical Exam  Temp:  [36.6 °C (97.9 °F)-37.1 °C (98.7 °F)] 37.1 °C (98.7 °F)  Pulse:  [67-77] 76  Resp:  [16-18] 16  BP: (108-131)/(61-64) 108/62  SpO2:  [92 %-96 %] 96 %    Physical Exam  Constitutional:       Appearance: Normal appearance. He is ill-appearing.      Comments: Drowsy, speaking slowly, states \"just woke up\"   HENT:      Head: Normocephalic and atraumatic.      Nose: Nose normal.      Mouth/Throat:      Mouth: Mucous membranes are moist.   Eyes:      Extraocular Movements: Extraocular movements intact.      Pupils: Pupils are equal, round, and reactive to light.   Neck:      Musculoskeletal: Normal range of motion and neck supple.   Cardiovascular:      Rate and Rhythm: Normal rate and regular rhythm.      Heart sounds: No murmur.   Pulmonary:      Effort: Pulmonary effort is normal. No respiratory distress.      Breath sounds: Normal breath sounds. No stridor.   Abdominal:      General: Abdomen is flat. Bowel sounds are normal. There is no distension.      Palpations: Abdomen is soft.      Tenderness: There is no abdominal tenderness.   Genitourinary:     Comments: Edema and induration in the groin and perineum, no purulent drainage noted  Musculoskeletal:         General: No swelling, tenderness or deformity.   Skin:     General: Skin is warm and dry.      Coloration: Skin is pale.   Neurological:      General: No focal deficit present.      Mental Status: He is oriented to person, place, and time. Mental status is at baseline.   Psychiatric:         Mood and Affect: Mood normal.         Behavior: Behavior normal.         Thought Content: Thought content normal.         Fluids    Intake/Output Summary (Last 24 hours) at 8/11/2020 1331  Last data filed at 8/11/2020 0600  Gross per 24 hour   Intake 1806.5 ml   Output 1850 ml   Net -43.5 ml       Laboratory  Recent Labs     " 08/09/20  0252 08/10/20  0359 08/11/20  0309   WBC 6.8 7.5 8.6   RBC 4.66* 4.81 4.62*   HEMOGLOBIN 11.5* 11.9* 11.6*   HEMATOCRIT 38.7* 39.9* 38.9*   MCV 83.0 83.0 84.2   MCH 24.7* 24.7* 25.1*   MCHC 29.7* 29.8* 29.8*   RDW 55.5* 55.2* 55.9*   PLATELETCT 652* 702* 688*   MPV 10.9 10.9 11.1     Recent Labs     08/09/20  0252 08/10/20  0359 08/11/20  0309   SODIUM 139 138 137   POTASSIUM 4.1 3.6 4.0   CHLORIDE 107 105 103   CO2 28 24 24   GLUCOSE 145* 126* 142*   BUN 6* 7* 8   CREATININE 0.89 0.70 0.77   CALCIUM 8.5 8.5 8.4                   Imaging  IR-MIDLINE CATHETER INSERTION WO GUIDANCE > AGE 3   Final Result                  Ultrasound-guided midline placement performed by qualified nursing staff    as above.          DX-CHEST-PORTABLE (1 VIEW)   Final Result         1. No acute cardiopulmonary abnormalities are identified.      CT-ABDOMEN-PELVIS WITH   Final Result      1.  Diffuse scrotal edema, eccentric to the RIGHT with extensive soft tissue gas present particularly in the RIGHT groin and hemiscrotum, consistent with Landon's gangrene.   2.  No discrete drainable abscess demonstrated.   3.  Minimal retroperitoneal adenopathy, nonspecific.   4.  Nonobstructing LEFT kidney stone.   5.  Splenomegaly.   6.  Cystic lesion in the pancreatic tail measuring 19 mm.  Neoplasm is not excluded.  Consider further evaluation with MRI.      These findings were discussed with AYALA HAMPTON on 7/25/2020 3:41 PM.      DX-CHEST-PORTABLE (1 VIEW)   Final Result      1.  There is no acute cardiopulmonary process.           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  Significant pain with dressing changes, requiring IV Ativan and IV Dilaudid  -Per ID, Dr. Clemente: Continue unasyn and fluconazole-2-week antibiotic course post last surgery (end 8/17/20)  Transition to Augmentin,  oral Diflucan on DC if prior to the above date  --- Monitor labs-evaluate LFTs every few days as patient is on fluconazole  --- Limit immunosuppression as much as possible to improve infection healing  -Tight glucose control, goal is less than 150  -Aggressive wound care and wound vac per surgery (placed 7/31)  -Dr. Rosales;wound dressing changed on 8/10  -Pain control- IV dilaudid, gabapentin, MS contin BID  -PT/OT as able  -Referral SNF vs LTAC    Stenosis of right carotid artery-Right CEA 3/21/18- (present on admission)  Assessment & Plan  Continue aspirin, Eliquis  Statin allergy    CAD (coronary artery disease)- (present on admission)  Assessment & Plan  No acute events  Continue aspirin  Statin      Type 2 diabetes mellitus, with long-term current use of insulin ([facility])- (present on admission)  Assessment & Plan  -Lantus 22 units- whs  -SSI      Paroxysmal A-fib ([facility])- (present on admission)  Assessment & Plan  Eliquis has been resumed, continue as there are no further washouts planned at this point  -Rate controlled    Polycythemia- (present on admission)  Assessment & Plan  Continue hydroxyurea    Debility  Assessment & Plan  -Patient deconditioned status   -Has been refusing PT/OT due to pain from wound  -Extensive RIGHT scrotal would - wound vac in place    Pancreatic cyst  Assessment & Plan  Follow up with imaging as outpatient      SLE (systemic lupus erythematosus related syndrome) ([facility])  Assessment & Plan  Prednisone on hold secondary to infection - watch for issues with adrenal insuffiencey           VTE prophylaxis: Eliquis    Total time:  40 minutes.  I spent greater than 50% of the time for patient care, counseling, and coordination on this date, including unit/floor time, and face-to-face time with the patient as per interval events and assessment and plan above

## 2020-08-11 NOTE — CARE PLAN
Problem: Skin Integrity  Goal: Risk for impaired skin integrity will decrease  Outcome: PROGRESSING AS EXPECTED  Intervention: Implement precautions to protect skin integrity in collaboration with the interdisciplinary team  Flowsheets  Taken 8/11/2020 1152  Protocols:   Appropriate Wound Protocols in Place   Pressure Ulcer Prevention / Intervention Protocol in Place  Taken 8/11/2020 0800  Skin Preventative Measures:   Pillows in Use for Support / Positioning   Waffle Cushion  Friction Interventions:   Overbed Trapeze in Use   Draw Sheet / Pad Used for Repositioning  PT / OT Involved in Care:   Physical Therapy Involved   Occupational Therapy Involved  Moisturizers: Antifungal Paste  Patient is Receiving Nutrition: Oral Intake Adequate  Note: Interventions in place to protect skin integrity. Skin integrity and all pressure points assessed q shift. Overbed trapeze in place to assist with mobility.     Problem: Respiratory:  Goal: Respiratory status will improve  Outcome: PROGRESSING AS EXPECTED  Intervention: Collaborate with respiratory therapist and Interdisciplinary Team on treatment measures to improve respiratory function  Note: RT involved in assessment of and interventions to improve respiratory status. Pt denies respiratory complaints this shift. PRN respiratory medications available.

## 2020-08-11 NOTE — PROGRESS NOTES
Received report from day shift. Assumed care of pt. Pt a&ox 4. VSS. Pt complains of 5/10 pain in right scrotum and back. Will medicate per MAR when medication available. No nausea reported. Wound vac at 125 mmHg with small amount of serosanguinous drainage in chamber. Andres catheter in place and draining with dark yellow urine. No further needs at this time. Bed locked and in lowest position. Call light within reach. Will continue to monitor.

## 2020-08-11 NOTE — CARE PLAN
Problem: Pain Management  Goal: Pain level will decrease to patient's comfort goal  Outcome: PROGRESSING AS EXPECTED  Intervention: Educate and implement non-pharmacologic comfort measures. Examples: relaxation, distration, play therapy, activity therapy, massage, etc.  Note: Administering pain medications as needed and per MAR     Problem: Fluid Volume:  Goal: Will maintain balanced intake and output  Outcome: PROGRESSING AS EXPECTED     Problem: Communication  Goal: The ability to communicate needs accurately and effectively will improve  Outcome: PROGRESSING AS EXPECTED     Problem: Safety  Goal: Will remain free from falls  Outcome: PROGRESSING AS EXPECTED  Intervention: Assess risk factors for falls  Note: Pt calls appropriately     Problem: Skin Integrity  Goal: Risk for impaired skin integrity will decrease  Outcome: PROGRESSING AS EXPECTED  Intervention: Assess risk factors for impaired skin integrity and/or pressure ulcers  Note: Q2 turns. Antifungal cream applied as ordered.

## 2020-08-11 NOTE — ASSESSMENT & PLAN NOTE
-has required phlebotomy in the past  -history of MI and TIA when platelets were greater than 1 million in the past  -stable  -Hematology following  -continue Hydrea dose decreased by hem/onc  -Trend CBC with differential daily to monitor for leukopenia - platelets trending down

## 2020-08-11 NOTE — WOUND TEAM
[facility]  Inpatient Services  Wound and Skin Care Progress note    Admission Date: 7/25/2020     Last order of IP CONSULT TO WOUND CARE was found on 7/30/2020 from Hospital Encounter on 7/25/2020     HPI, PMH, SH: Reviewed    Unit where seen by Wound Team: 2215/01     WOUND CONSULT/FOLLOW-UP RELATED TO:  NPWT Change    Self Report / Pain Level:  Premedicated by primary RN Ativan then IV pain meds, lidocaine instilled in foam       OBJECTIVE:  On pressure redistribution mattress, drsg intact    WOUND TYPE, LOCATION, CHARACTERISTICS (Pressure Injuries: location, stage, POA or date identified)  Wound 07/25/20 Full Thickness Wound Scrotum Right (Active)      08/10/20 1645   Site Assessment Red;Granulation tissue    Periwound Assessment Intact;Induration    Margins Defined edges    Closure Secondary intention    Drainage Amount Small    Drainage Description Serosanguineous    Treatments Cleansed    Wound Cleansing Normal Saline Irrigation    Periwound Protectant Benzoin;Paste Ring    Dressing Cleansing/Solutions Normal Saline    Dressing Options Wound Vac    Dressing Changed Changed    Dressing Status Intact    Dressing Change/Treatment Frequency Monday, Wednesday, Friday, and As Needed    NEXT Dressing Change/Treatment Date 08/12/20    NEXT Weekly Photo (Inpatient Only) 08/17/20    Non-staged Wound Description Full thickness    Wound Length (cm) 6 cm 08/10/20 1645   Wound Width (cm) 10 cm 08/10/20 1645   Wound Depth (cm) 4 cm 08/10/20 1645   Wound Surface Area (cm^2) 60 cm^2 08/10/20 1645   Wound Volume (cm^3) 240 cm^3 08/10/20 1645   Wound Healing % -439 08/10/20 1645   Wound Bed Granulation (%) 95 % 08/07/20 0900   Tunneling (cm) 5.5 cm 08/10/20 1645   Tunneling Clock Position of Wound 11 08/10/20 1645   Tunneling - 2nd Location (cm) 10 cm 08/03/20 1430   Tunneling Clock Position of Wound - 2nd Location 11 08/03/20 1430   Undermining (cm) 4.3 cm 07/29/20 1600   Undermining of Wound, 1st Location From  9 o'clock;To 10 o'clock 07/29/20 1600   Undermining (cm) - 2nd location 3.4 cm 07/27/20 1900   Undermining of Wound, 2nd Location From 2 o'clock;To 3 o'clock 07/27/20 1900   Shape irregular    Wound Odor None    Pulses N/A    Exposed Structures testicle;Connective tissue    Number of days: 16      Negative Pressure Wound Therapy 07/27/20 Surgical (Active)   NPWT Pump Mode / Pressure Setting Ulta;125 mmHg    Dressing Type Medium;Black Foam (Veraflo)    Number of Foam Pieces Used 1    Canister Changed No    NEXT Dressing Change/Treatment Date 08/12/20    VAC VeraFlo Irrigant Normal Saline    VAC VeraFlo Soak Time (mins) 4    VAC VeraFlo Instill Volume (ml) 16    VAC VeraFlo - Therapy Time (hrs) 2.5    VAC VeraFlo Pressure (mm/Hg) 125 mmHg      Vascular:    JW:   No results found.      Lab Values:    Lab Results   Component Value Date/Time    WBC 7.5 08/10/2020 03:59 AM    RBC 4.81 08/10/2020 03:59 AM    HEMOGLOBIN 11.9 (L) 08/10/2020 03:59 AM    HEMATOCRIT 39.9 (L) 08/10/2020 03:59 AM    CREACTPROT 0.58 07/01/2020 07:03 AM    SEDRATEWES 15 07/01/2020 07:03 AM    HBA1C 8.6 (A) 01/07/2020 07:14 AM          Culture Results show:  Recent Results (from the past 720 hour(s))   CULTURE WOUND W/ GRAM STAIN    Collection Time: 07/31/20  6:45 PM    Specimen: Wound   Result Value Ref Range    Significant Indicator POS (POS)     Source WND     Site SCROTAL ABSCESS     Culture Result (A)      Growth noted after further incubation, see below for  organism identification.      Gram Stain Result       Many WBCs.  Rare Gram positive cocci.  Rare Yeast.      Culture Result Candida albicans  Rare growth   (A)          INTERVENTIONS BY WOUND TEAM:  RN gave pt Ativan. Soaked drsg with NS, then instilled 1/2 of lidocaine. Prepared drsg materials. Removed drsg with adhesive remover, cleaned wound with NS, dried. Applied lidocaine to wound bed. Benzoin and paste ring to sharmaine-wound . Adaptic around testicle. One piece of medium Veraflow foam  to wound. Sealed and Vera deep resumed @ 16 ml NS instillation for 4 min dwell and NPWT @ 125 mmHg continuous for 2.5 H.   Pt turned to L side, assessed buttocks wound, cleaned of smear, more antifungal barrier paste applied, pad changed and pt repositioned.   Interdisciplinary consultation: Patient, Bedside RN      EVALUATION: Pt's wound continuing to improve. Better tolerance of drsg change with additional pain medicine. Buttocks pressure injury has resolved.     Goals: Steady decrease in wound area and depth weekly.    NURSING PLAN OF CARE ORDERS (X):     Dressing changes: Continue previous Dressing Maintenance orders:   x     See new Dressing Maintenance orders:       Skin care: See Skin Care orders:        Rectal tube care: See Rectal Tube Care orders:      Other orders:           WOUND TEAM PLAN OF CARE:   Dressing changes by wound team:          Follow up 1-2 times weekly:               Follow up 3 times weekly:                NPWT change 3 times weekly:  x   Follow up as needed:       Other (explain):     Anticipated discharge plans:   LTACH:        SNF/Rehab:                  Home Care:           Outpatient Wound Center:            Self Care:           Other: has VAC and supplies in room-if pt going to [facility] may need to return to [facility]

## 2020-08-11 NOTE — DISCHARGE PLANNING
Agency/Facility Name: [facility]   Spoke To: Patricia   Outcome: Per Patricia no bed is available today, a possible spot may open tomorrow, [facility] agrees to f/u.

## 2020-08-12 LAB
ALBUMIN SERPL BCP-MCNC: 2.2 G/DL (ref 3.2–4.9)
ALBUMIN/GLOB SERPL: 0.6 G/DL
ALP SERPL-CCNC: 59 U/L (ref 30–99)
ALT SERPL-CCNC: 7 U/L (ref 2–50)
ANION GAP SERPL CALC-SCNC: 8 MMOL/L (ref 7–16)
AST SERPL-CCNC: 18 U/L (ref 12–45)
BASOPHILS # BLD AUTO: 1.2 % (ref 0–1.8)
BASOPHILS # BLD: 0.09 K/UL (ref 0–0.12)
BILIRUB SERPL-MCNC: 0.2 MG/DL (ref 0.1–1.5)
BUN SERPL-MCNC: 10 MG/DL (ref 8–22)
CALCIUM SERPL-MCNC: 8.2 MG/DL (ref 8.4–10.2)
CHLORIDE SERPL-SCNC: 105 MMOL/L (ref 96–112)
CO2 SERPL-SCNC: 24 MMOL/L (ref 20–33)
COMMENT 1642: NORMAL
CREAT SERPL-MCNC: 0.79 MG/DL (ref 0.5–1.4)
EOSINOPHIL # BLD AUTO: 0.26 K/UL (ref 0–0.51)
EOSINOPHIL NFR BLD: 3.6 % (ref 0–6.9)
ERYTHROCYTE [DISTWIDTH] IN BLOOD BY AUTOMATED COUNT: 54.7 FL (ref 35.9–50)
GLOBULIN SER CALC-MCNC: 4 G/DL (ref 1.9–3.5)
GLUCOSE BLD-MCNC: 117 MG/DL (ref 65–99)
GLUCOSE BLD-MCNC: 137 MG/DL (ref 65–99)
GLUCOSE BLD-MCNC: 177 MG/DL (ref 65–99)
GLUCOSE SERPL-MCNC: 127 MG/DL (ref 65–99)
HCT VFR BLD AUTO: 39.8 % (ref 42–52)
HGB BLD-MCNC: 11.7 G/DL (ref 14–18)
HYPOCHROMIA BLD QL SMEAR: ABNORMAL
IMM GRANULOCYTES # BLD AUTO: 0.13 K/UL (ref 0–0.11)
IMM GRANULOCYTES NFR BLD AUTO: 1.8 % (ref 0–0.9)
LG PLATELETS BLD QL SMEAR: NORMAL
LYMPHOCYTES # BLD AUTO: 1.01 K/UL (ref 1–4.8)
LYMPHOCYTES NFR BLD: 13.8 % (ref 22–41)
MAGNESIUM SERPL-MCNC: 1.8 MG/DL (ref 1.5–2.5)
MCH RBC QN AUTO: 24.5 PG (ref 27–33)
MCHC RBC AUTO-ENTMCNC: 29.4 G/DL (ref 33.7–35.3)
MCV RBC AUTO: 83.3 FL (ref 81.4–97.8)
MONOCYTES # BLD AUTO: 0.47 K/UL (ref 0–0.85)
MONOCYTES NFR BLD AUTO: 6.4 % (ref 0–13.4)
NEUTROPHILS # BLD AUTO: 5.36 K/UL (ref 1.82–7.42)
NEUTROPHILS NFR BLD: 73.2 % (ref 44–72)
NRBC # BLD AUTO: 0 K/UL
NRBC BLD-RTO: 0 /100 WBC
PLATELET # BLD AUTO: 751 K/UL (ref 164–446)
PLATELET BLD QL SMEAR: NORMAL
PMV BLD AUTO: 11 FL (ref 9–12.9)
POLYCHROMASIA BLD QL SMEAR: NORMAL
POTASSIUM SERPL-SCNC: 3.8 MMOL/L (ref 3.6–5.5)
PROT SERPL-MCNC: 6.2 G/DL (ref 6–8.2)
RBC # BLD AUTO: 4.78 M/UL (ref 4.7–6.1)
RBC BLD AUTO: PRESENT
SODIUM SERPL-SCNC: 137 MMOL/L (ref 135–145)
WBC # BLD AUTO: 7.3 K/UL (ref 4.8–10.8)

## 2020-08-12 PROCEDURE — 700101 HCHG RX REV CODE 250: Performed by: INTERNAL MEDICINE

## 2020-08-12 PROCEDURE — 700111 HCHG RX REV CODE 636 W/ 250 OVERRIDE (IP): Performed by: INTERNAL MEDICINE

## 2020-08-12 PROCEDURE — A9270 NON-COVERED ITEM OR SERVICE: HCPCS | Performed by: INTERNAL MEDICINE

## 2020-08-12 PROCEDURE — 82962 GLUCOSE BLOOD TEST: CPT | Mod: 91

## 2020-08-12 PROCEDURE — 83735 ASSAY OF MAGNESIUM: CPT

## 2020-08-12 PROCEDURE — 80053 COMPREHEN METABOLIC PANEL: CPT

## 2020-08-12 PROCEDURE — 700111 HCHG RX REV CODE 636 W/ 250 OVERRIDE (IP): Performed by: NURSE PRACTITIONER

## 2020-08-12 PROCEDURE — A9270 NON-COVERED ITEM OR SERVICE: HCPCS | Performed by: HOSPITALIST

## 2020-08-12 PROCEDURE — 700102 HCHG RX REV CODE 250 W/ 637 OVERRIDE(OP): Performed by: NURSE PRACTITIONER

## 2020-08-12 PROCEDURE — 700102 HCHG RX REV CODE 250 W/ 637 OVERRIDE(OP): Performed by: HOSPITALIST

## 2020-08-12 PROCEDURE — 85025 COMPLETE CBC W/AUTO DIFF WBC: CPT

## 2020-08-12 PROCEDURE — 700102 HCHG RX REV CODE 250 W/ 637 OVERRIDE(OP): Performed by: INTERNAL MEDICINE

## 2020-08-12 PROCEDURE — 700105 HCHG RX REV CODE 258: Performed by: INTERNAL MEDICINE

## 2020-08-12 PROCEDURE — 770006 HCHG ROOM/CARE - MED/SURG/GYN SEMI*

## 2020-08-12 PROCEDURE — 36415 COLL VENOUS BLD VENIPUNCTURE: CPT

## 2020-08-12 PROCEDURE — 99232 SBSQ HOSP IP/OBS MODERATE 35: CPT | Performed by: INTERNAL MEDICINE

## 2020-08-12 PROCEDURE — A9270 NON-COVERED ITEM OR SERVICE: HCPCS | Performed by: NURSE PRACTITIONER

## 2020-08-12 PROCEDURE — 97606 NEG PRS WND THER DME>50 SQCM: CPT

## 2020-08-12 RX ORDER — GABAPENTIN 400 MG/1
400 CAPSULE ORAL 3 TIMES DAILY
Status: DISCONTINUED | OUTPATIENT
Start: 2020-08-12 | End: 2020-08-18

## 2020-08-12 RX ADMIN — MORPHINE SULFATE 15 MG: 15 TABLET, EXTENDED RELEASE ORAL at 07:33

## 2020-08-12 RX ADMIN — MORPHINE SULFATE 4 MG: 4 INJECTION INTRAVENOUS at 18:10

## 2020-08-12 RX ADMIN — SENNOSIDES-DOCUSATE SODIUM TAB 8.6-50 MG 2 TABLET: 8.6-5 TAB at 07:34

## 2020-08-12 RX ADMIN — MORPHINE SULFATE 4 MG: 4 INJECTION INTRAVENOUS at 23:11

## 2020-08-12 RX ADMIN — AMPICILLIN SODIUM AND SULBACTAM SODIUM 3 G: 2; 1 INJECTION, POWDER, FOR SOLUTION INTRAMUSCULAR; INTRAVENOUS at 17:26

## 2020-08-12 RX ADMIN — OXYCODONE HYDROCHLORIDE 10 MG: 5 TABLET ORAL at 10:24

## 2020-08-12 RX ADMIN — OMEPRAZOLE 20 MG: 20 CAPSULE, DELAYED RELEASE ORAL at 07:33

## 2020-08-12 RX ADMIN — THERA TABS 1 TABLET: TAB at 07:34

## 2020-08-12 RX ADMIN — AMPICILLIN SODIUM AND SULBACTAM SODIUM 3 G: 2; 1 INJECTION, POWDER, FOR SOLUTION INTRAMUSCULAR; INTRAVENOUS at 05:23

## 2020-08-12 RX ADMIN — GABAPENTIN 300 MG: 300 CAPSULE ORAL at 11:34

## 2020-08-12 RX ADMIN — HYDROXYUREA 500 MG: 500 CAPSULE ORAL at 20:08

## 2020-08-12 RX ADMIN — AZATHIOPRINE 50 MG: 50 TABLET ORAL at 17:26

## 2020-08-12 RX ADMIN — MORPHINE SULFATE 15 MG: 15 TABLET, EXTENDED RELEASE ORAL at 17:25

## 2020-08-12 RX ADMIN — MORPHINE SULFATE 4 MG: 4 INJECTION INTRAVENOUS at 13:04

## 2020-08-12 RX ADMIN — APIXABAN 5 MG: 5 TABLET, FILM COATED ORAL at 07:33

## 2020-08-12 RX ADMIN — LORAZEPAM 1 MG: 2 INJECTION INTRAMUSCULAR; INTRAVENOUS at 13:15

## 2020-08-12 RX ADMIN — ASPIRIN 81 MG: 81 TABLET, COATED ORAL at 07:34

## 2020-08-12 RX ADMIN — GABAPENTIN 400 MG: 400 CAPSULE ORAL at 17:25

## 2020-08-12 RX ADMIN — FLUCONAZOLE 200 MG: 200 TABLET ORAL at 07:34

## 2020-08-12 RX ADMIN — OXYCODONE HYDROCHLORIDE 10 MG: 5 TABLET ORAL at 20:08

## 2020-08-12 RX ADMIN — MAGNESIUM HYDROXIDE 30 ML: 400 SUSPENSION ORAL at 23:20

## 2020-08-12 RX ADMIN — AMPICILLIN SODIUM AND SULBACTAM SODIUM 3 G: 2; 1 INJECTION, POWDER, FOR SOLUTION INTRAMUSCULAR; INTRAVENOUS at 23:11

## 2020-08-12 RX ADMIN — AMPICILLIN SODIUM AND SULBACTAM SODIUM 3 G: 2; 1 INJECTION, POWDER, FOR SOLUTION INTRAMUSCULAR; INTRAVENOUS at 11:34

## 2020-08-12 RX ADMIN — GABAPENTIN 300 MG: 300 CAPSULE ORAL at 07:33

## 2020-08-12 RX ADMIN — SENNOSIDES-DOCUSATE SODIUM TAB 8.6-50 MG 2 TABLET: 8.6-5 TAB at 17:25

## 2020-08-12 RX ADMIN — INSULIN LISPRO 4 UNITS: 100 INJECTION, SOLUTION INTRAVENOUS; SUBCUTANEOUS at 17:20

## 2020-08-12 RX ADMIN — POLYETHYLENE GLYCOL 3350 1 PACKET: 17 POWDER, FOR SOLUTION ORAL at 20:08

## 2020-08-12 RX ADMIN — AZATHIOPRINE 50 MG: 50 TABLET ORAL at 07:34

## 2020-08-12 RX ADMIN — APIXABAN 5 MG: 5 TABLET, FILM COATED ORAL at 17:25

## 2020-08-12 RX ADMIN — MORPHINE SULFATE 4 MG: 4 INJECTION INTRAVENOUS at 10:24

## 2020-08-12 RX ADMIN — LIDOCAINE HYDROCHLORIDE 20 ML: 20 INJECTION, SOLUTION INFILTRATION; PERINEURAL at 13:14

## 2020-08-12 ASSESSMENT — ENCOUNTER SYMPTOMS
VOMITING: 0
HEARTBURN: 0
HEADACHES: 0
SHORTNESS OF BREATH: 0
BLOOD IN STOOL: 0
PND: 0
COUGH: 0
FALLS: 0
DIARRHEA: 0
HALLUCINATIONS: 0
BACK PAIN: 0
SORE THROAT: 0
NAUSEA: 0
CHILLS: 0
WEAKNESS: 1
ABDOMINAL PAIN: 0
DIZZINESS: 0
FEVER: 0
NERVOUS/ANXIOUS: 1
DEPRESSION: 0
FOCAL WEAKNESS: 0
MYALGIAS: 1

## 2020-08-12 NOTE — PROGRESS NOTES
Medicare 20-day recertification    Patient requires ongoing hospitalization past 20 days for ongoing wound care, IV antibiotic therapy, PT OT.  His long-term goal is to have antibiotic treatment and wound care at an LTAC however currently there are no beds available.    Dipika Cantu D.O.

## 2020-08-12 NOTE — PROGRESS NOTES
Bedside report received from night RN. Assumed care of patient. Daily plan of care discussed. Pt requested 0600 medications to be given with breakfast; pt given all medications but refused AM insulin. Pt denies complaints or concerns, requesting to mobilize today. Pt currently denies intolerable pain. Hourly rounding in place.

## 2020-08-12 NOTE — PROGRESS NOTES
[facility] Medicine Daily Progress Note    Date of Service  8/12/2020    Chief Complaint  69 y.o. male admitted 7/25/2020 with scrotal swelling and pain    Hospital Course    History of coronary artery disease status post stent in the past, type 2 diabetes, paroxysmal atrial fibrillation on Eliquis, polycythemia on hydroxyurea, SLE on Imuran, admitted for scrotal pain and swelling found to have a right scrotal abscess and Landon's gangrene.  Urology was emergently consulted and completed a washout on 725.  Since then, he has undergone 3 additional washouts on 7/29, 7/31, 8/3.  He has a wound VAC in place and is having significant discomfort with dressing changes.  Overall, infectious markers have improved.  Cultures grew Candida albicans therefore Diflucan has been added to his antibiotic regimen.      Interval Problem Update  8/11: Tolerating antibiotics, gabapentin is helping with shooting nerve pain.  Still requiring IV Ativan and Dilaudid prior to dressing changes.  Disposition pending, needs to work with PT OT.  8/12: Tolerating antibiotics, medicated for dressing change today.  Gabapentin increased.  Glucose is well controlled in the 150s to 160s.    Consultants/Specialty  Dr. kidd, urology    Code Status  Full Code    Disposition  SNF versus LTAC    Review of Systems  Review of Systems   Constitutional: Positive for malaise/fatigue (From narcotics). Negative for chills and fever.        Uncomfortable, anxious   HENT: Negative for hearing loss, sore throat and tinnitus.    Respiratory: Negative for cough and shortness of breath.    Cardiovascular: Negative for chest pain, leg swelling and PND.   Gastrointestinal: Negative for abdominal pain, blood in stool, diarrhea, heartburn, nausea and vomiting.   Genitourinary: Negative for dysuria, frequency and hematuria.        Shooting pain in R testicle, radiating into abdomen.  Gabapentin helps with this.   Musculoskeletal: Positive for myalgias. Negative for back  pain, falls and joint pain.   Neurological: Positive for weakness (Working on leg exercises while lying in bed). Negative for dizziness, focal weakness and headaches.   Psychiatric/Behavioral: Negative for depression and hallucinations. The patient is nervous/anxious.    All other systems reviewed and are negative.       Physical Exam  Temp:  [36.7 °C (98.1 °F)-37 °C (98.6 °F)] 36.8 °C (98.3 °F)  Pulse:  [65-82] 82  Resp:  [16-18] 18  BP: (122-137)/(67-73) 137/67  SpO2:  [96 %-98 %] 98 %    Physical Exam  Constitutional:       Appearance: Normal appearance. He is ill-appearing.   HENT:      Head: Normocephalic and atraumatic.      Nose: Nose normal.      Mouth/Throat:      Mouth: Mucous membranes are moist.   Eyes:      Extraocular Movements: Extraocular movements intact.      Pupils: Pupils are equal, round, and reactive to light.   Neck:      Musculoskeletal: Normal range of motion and neck supple.   Cardiovascular:      Rate and Rhythm: Normal rate and regular rhythm.      Heart sounds: No murmur.   Pulmonary:      Effort: Pulmonary effort is normal. No respiratory distress.      Breath sounds: Normal breath sounds. No stridor.   Abdominal:      General: Abdomen is flat. Bowel sounds are normal. There is no distension.      Palpations: Abdomen is soft.      Tenderness: There is no abdominal tenderness.   Genitourinary:     Comments: Edema and induration in the groin and perineum, no purulent drainage noted  Musculoskeletal:         General: No swelling, tenderness or deformity.   Skin:     General: Skin is warm and dry.      Coloration: Skin is pale.   Neurological:      General: No focal deficit present.      Mental Status: He is oriented to person, place, and time. Mental status is at baseline.   Psychiatric:         Mood and Affect: Mood normal.         Behavior: Behavior is slowed.         Thought Content: Thought content normal.         Fluids    Intake/Output Summary (Last 24 hours) at 8/12/2020 1331  Last  data filed at 8/12/2020 1200  Gross per 24 hour   Intake 2403.5 ml   Output 4400 ml   Net -1996.5 ml       Laboratory  Recent Labs     08/10/20  0359 08/11/20  0309 08/12/20  0348   WBC 7.5 8.6 7.3   RBC 4.81 4.62* 4.78   HEMOGLOBIN 11.9* 11.6* 11.7*   HEMATOCRIT 39.9* 38.9* 39.8*   MCV 83.0 84.2 83.3   MCH 24.7* 25.1* 24.5*   MCHC 29.8* 29.8* 29.4*   RDW 55.2* 55.9* 54.7*   PLATELETCT 702* 688* 751*   MPV 10.9 11.1 11.0     Recent Labs     08/10/20  0359 08/11/20  0309 08/12/20  0348   SODIUM 138 137 137   POTASSIUM 3.6 4.0 3.8   CHLORIDE 105 103 105   CO2 24 24 24   GLUCOSE 126* 142* 127*   BUN 7* 8 10   CREATININE 0.70 0.77 0.79   CALCIUM 8.5 8.4 8.2*                   Imaging  IR-MIDLINE CATHETER INSERTION WO GUIDANCE > AGE 3   Final Result                  Ultrasound-guided midline placement performed by qualified nursing staff    as above.          DX-CHEST-PORTABLE (1 VIEW)   Final Result         1. No acute cardiopulmonary abnormalities are identified.      CT-ABDOMEN-PELVIS WITH   Final Result      1.  Diffuse scrotal edema, eccentric to the RIGHT with extensive soft tissue gas present particularly in the RIGHT groin and hemiscrotum, consistent with Landon's gangrene.   2.  No discrete drainable abscess demonstrated.   3.  Minimal retroperitoneal adenopathy, nonspecific.   4.  Nonobstructing LEFT kidney stone.   5.  Splenomegaly.   6.  Cystic lesion in the pancreatic tail measuring 19 mm.  Neoplasm is not excluded.  Consider further evaluation with MRI.      These findings were discussed with AYALA HAMPTON on 7/25/2020 3:41 PM.      DX-CHEST-PORTABLE (1 VIEW)   Final Result      1.  There is no acute cardiopulmonary process.           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  Significant pain with dressing changes, requiring IV Ativan and IV  Dilaudid  Increase gabapentin to 400 3 times daily  Per ID: Continue unasyn and fluconazole-2-week antibiotic course post last surgery (end 8/17/20)  Transition to Augmentin, oral Diflucan on DC if prior to the above date  Monitor LFTs daily  Limit immunosuppression as much as possible to improve infection healing  Tight glucose control, goal is less than 150  Aggressive wound care and wound vac per surgery (placed 7/31, replaced 8/10)  Pain control- IV dilaudid, gabapentin, MS contin BID  PT/OT as able  Referral SNF vs LTAC pending    Stenosis of right carotid artery-Right CEA 3/21/18- (present on admission)  Assessment & Plan  Continue aspirin, Eliquis  Statin allergy    CAD (coronary artery disease)- (present on admission)  Assessment & Plan  No acute events  Continue aspirin  Statin      Type 2 diabetes mellitus, with long-term current use of insulin (HCC)- (present on admission)  Assessment & Plan  -Lantus 22 units- whs  -SSI      Paroxysmal A-fib (HCC)- (present on admission)  Assessment & Plan  Eliquis has been resumed, continue as there are no further washouts planned at this point  -Rate controlled    Polycythemia- (present on admission)  Assessment & Plan  Continue hydroxyurea    Debility  Assessment & Plan  -Patient deconditioned status   PT OT  -Extensive RIGHT scrotal would - wound vac in place    Pancreatic cyst  Assessment & Plan  Follow up with imaging as outpatient      SLE (systemic lupus erythematosus related syndrome) ([facility])  Assessment & Plan  Prednisone on hold secondary to infection - watch for issues with adrenal insuffiencey         VTE prophylaxis: Eliquis

## 2020-08-12 NOTE — CARE PLAN
Received report from day shift nurse.   Assumed pt care at approximately 1900.  Pt is A&Ox4, resting comfortably in bed.   Pt on r.a.. No signs of SOB/respiratory distress.   Labs noted, VSS.   Pt c/o 8/10 pain at this moment, medicated per MAR.   Assessment completed, wound vac in place on right scrotum, dressing C/D/I.  Fall precautions in place.   Bed at lowest position.   Call light and personal belongings within reach. Continue to monitor         Problem: Pain Management  Goal: Pain level will decrease to patient's comfort goal  Outcome: PROGRESSING AS EXPECTED     Problem: Infection  Goal: Will remain free from infection  Outcome: PROGRESSING AS EXPECTED

## 2020-08-12 NOTE — DISCHARGE PLANNING
Anticipated Discharge Disposition: LTAC    Action: SW received phone call from July ([facility]I) she is coordinating  of wound vac since patient is going to LTAC.       Barriers to Discharge: beds at LTAC    Plan: SW to follow up in treatment team.

## 2020-08-12 NOTE — DISCHARGE PLANNING
Agency/Facility Name: [facility]   Spoke To: Patricia   Outcome: No bed availability today, [facility] agrees to check back tomorrow.

## 2020-08-13 LAB
BASOPHILS # BLD AUTO: 1.3 % (ref 0–1.8)
BASOPHILS # BLD: 0.1 K/UL (ref 0–0.12)
EOSINOPHIL # BLD AUTO: 0.34 K/UL (ref 0–0.51)
EOSINOPHIL NFR BLD: 4.3 % (ref 0–6.9)
ERYTHROCYTE [DISTWIDTH] IN BLOOD BY AUTOMATED COUNT: 55.6 FL (ref 35.9–50)
GLUCOSE BLD-MCNC: 108 MG/DL (ref 65–99)
GLUCOSE BLD-MCNC: 116 MG/DL (ref 65–99)
GLUCOSE BLD-MCNC: 135 MG/DL (ref 65–99)
GLUCOSE BLD-MCNC: 162 MG/DL (ref 65–99)
HCT VFR BLD AUTO: 41.2 % (ref 42–52)
HGB BLD-MCNC: 12.2 G/DL (ref 14–18)
IMM GRANULOCYTES # BLD AUTO: 0.12 K/UL (ref 0–0.11)
IMM GRANULOCYTES NFR BLD AUTO: 1.5 % (ref 0–0.9)
LYMPHOCYTES # BLD AUTO: 1.04 K/UL (ref 1–4.8)
LYMPHOCYTES NFR BLD: 13.1 % (ref 22–41)
MCH RBC QN AUTO: 24.5 PG (ref 27–33)
MCHC RBC AUTO-ENTMCNC: 29.6 G/DL (ref 33.7–35.3)
MCV RBC AUTO: 82.9 FL (ref 81.4–97.8)
MONOCYTES # BLD AUTO: 0.55 K/UL (ref 0–0.85)
MONOCYTES NFR BLD AUTO: 6.9 % (ref 0–13.4)
NEUTROPHILS # BLD AUTO: 5.8 K/UL (ref 1.82–7.42)
NEUTROPHILS NFR BLD: 72.9 % (ref 44–72)
NRBC # BLD AUTO: 0 K/UL
NRBC BLD-RTO: 0 /100 WBC
PLATELET # BLD AUTO: 840 K/UL (ref 164–446)
PMV BLD AUTO: 10.8 FL (ref 9–12.9)
RBC # BLD AUTO: 4.97 M/UL (ref 4.7–6.1)
WBC # BLD AUTO: 8 K/UL (ref 4.8–10.8)

## 2020-08-13 PROCEDURE — A9270 NON-COVERED ITEM OR SERVICE: HCPCS | Performed by: INTERNAL MEDICINE

## 2020-08-13 PROCEDURE — A9270 NON-COVERED ITEM OR SERVICE: HCPCS | Performed by: HOSPITALIST

## 2020-08-13 PROCEDURE — 82962 GLUCOSE BLOOD TEST: CPT | Mod: 91

## 2020-08-13 PROCEDURE — 97116 GAIT TRAINING THERAPY: CPT

## 2020-08-13 PROCEDURE — A9270 NON-COVERED ITEM OR SERVICE: HCPCS | Performed by: NURSE PRACTITIONER

## 2020-08-13 PROCEDURE — 700102 HCHG RX REV CODE 250 W/ 637 OVERRIDE(OP): Performed by: INTERNAL MEDICINE

## 2020-08-13 PROCEDURE — 700102 HCHG RX REV CODE 250 W/ 637 OVERRIDE(OP): Performed by: NURSE PRACTITIONER

## 2020-08-13 PROCEDURE — 85025 COMPLETE CBC W/AUTO DIFF WBC: CPT

## 2020-08-13 PROCEDURE — 700111 HCHG RX REV CODE 636 W/ 250 OVERRIDE (IP): Performed by: NURSE PRACTITIONER

## 2020-08-13 PROCEDURE — 700105 HCHG RX REV CODE 258: Performed by: INTERNAL MEDICINE

## 2020-08-13 PROCEDURE — 97530 THERAPEUTIC ACTIVITIES: CPT

## 2020-08-13 PROCEDURE — 700111 HCHG RX REV CODE 636 W/ 250 OVERRIDE (IP): Performed by: INTERNAL MEDICINE

## 2020-08-13 PROCEDURE — 99233 SBSQ HOSP IP/OBS HIGH 50: CPT | Performed by: INTERNAL MEDICINE

## 2020-08-13 PROCEDURE — 36415 COLL VENOUS BLD VENIPUNCTURE: CPT

## 2020-08-13 PROCEDURE — 770006 HCHG ROOM/CARE - MED/SURG/GYN SEMI*

## 2020-08-13 PROCEDURE — 700102 HCHG RX REV CODE 250 W/ 637 OVERRIDE(OP): Performed by: HOSPITALIST

## 2020-08-13 RX ORDER — HYDROXYUREA 500 MG/1
1000 CAPSULE ORAL NIGHTLY
Status: DISCONTINUED | OUTPATIENT
Start: 2020-08-13 | End: 2020-08-15

## 2020-08-13 RX ORDER — MORPHINE SULFATE 4 MG/ML
2-4 INJECTION, SOLUTION INTRAMUSCULAR; INTRAVENOUS EVERY 6 HOURS PRN
Status: DISCONTINUED | OUTPATIENT
Start: 2020-08-13 | End: 2020-08-21

## 2020-08-13 RX ORDER — HYDROXYUREA 500 MG/1
500 CAPSULE ORAL 2 TIMES DAILY
Status: DISCONTINUED | OUTPATIENT
Start: 2020-08-13 | End: 2020-08-13

## 2020-08-13 RX ADMIN — OXYCODONE HYDROCHLORIDE 10 MG: 5 TABLET ORAL at 19:56

## 2020-08-13 RX ADMIN — THERA TABS 1 TABLET: TAB at 06:24

## 2020-08-13 RX ADMIN — ASPIRIN 81 MG: 81 TABLET, COATED ORAL at 06:24

## 2020-08-13 RX ADMIN — AMPICILLIN SODIUM AND SULBACTAM SODIUM 3 G: 2; 1 INJECTION, POWDER, FOR SOLUTION INTRAMUSCULAR; INTRAVENOUS at 17:21

## 2020-08-13 RX ADMIN — GABAPENTIN 400 MG: 400 CAPSULE ORAL at 11:50

## 2020-08-13 RX ADMIN — AZATHIOPRINE 50 MG: 50 TABLET ORAL at 17:21

## 2020-08-13 RX ADMIN — SENNOSIDES-DOCUSATE SODIUM TAB 8.6-50 MG 2 TABLET: 8.6-5 TAB at 06:24

## 2020-08-13 RX ADMIN — HYDROXYUREA 1000 MG: 500 CAPSULE ORAL at 21:08

## 2020-08-13 RX ADMIN — MORPHINE SULFATE 4 MG: 4 INJECTION INTRAVENOUS at 16:57

## 2020-08-13 RX ADMIN — APIXABAN 5 MG: 5 TABLET, FILM COATED ORAL at 06:24

## 2020-08-13 RX ADMIN — APIXABAN 5 MG: 5 TABLET, FILM COATED ORAL at 17:20

## 2020-08-13 RX ADMIN — SENNOSIDES-DOCUSATE SODIUM TAB 8.6-50 MG 2 TABLET: 8.6-5 TAB at 17:20

## 2020-08-13 RX ADMIN — OXYCODONE HYDROCHLORIDE 5 MG: 5 TABLET ORAL at 21:08

## 2020-08-13 RX ADMIN — AMPICILLIN SODIUM AND SULBACTAM SODIUM 3 G: 2; 1 INJECTION, POWDER, FOR SOLUTION INTRAMUSCULAR; INTRAVENOUS at 06:21

## 2020-08-13 RX ADMIN — OMEPRAZOLE 20 MG: 20 CAPSULE, DELAYED RELEASE ORAL at 06:24

## 2020-08-13 RX ADMIN — INSULIN LISPRO 3 UNITS: 100 INJECTION, SOLUTION INTRAVENOUS; SUBCUTANEOUS at 21:14

## 2020-08-13 RX ADMIN — MORPHINE SULFATE 15 MG: 15 TABLET, EXTENDED RELEASE ORAL at 06:24

## 2020-08-13 RX ADMIN — AMPICILLIN SODIUM AND SULBACTAM SODIUM 3 G: 2; 1 INJECTION, POWDER, FOR SOLUTION INTRAMUSCULAR; INTRAVENOUS at 11:51

## 2020-08-13 RX ADMIN — GABAPENTIN 400 MG: 400 CAPSULE ORAL at 16:28

## 2020-08-13 RX ADMIN — OXYCODONE HYDROCHLORIDE 10 MG: 5 TABLET ORAL at 15:04

## 2020-08-13 RX ADMIN — GABAPENTIN 400 MG: 400 CAPSULE ORAL at 06:24

## 2020-08-13 RX ADMIN — AZATHIOPRINE 50 MG: 50 TABLET ORAL at 06:25

## 2020-08-13 RX ADMIN — FLUCONAZOLE 200 MG: 200 TABLET ORAL at 06:24

## 2020-08-13 ASSESSMENT — GAIT ASSESSMENTS
ASSISTIVE DEVICE: FRONT WHEEL WALKER
DISTANCE (FEET): 150
DEVIATION: STEP TO
GAIT LEVEL OF ASSIST: MINIMAL ASSIST

## 2020-08-13 ASSESSMENT — ENCOUNTER SYMPTOMS
DEPRESSION: 0
BLOOD IN STOOL: 0
SPEECH CHANGE: 1
FEVER: 0
BLURRED VISION: 0
ABDOMINAL PAIN: 0
FALLS: 0
DIARRHEA: 0
WEAKNESS: 1
NAUSEA: 0
NERVOUS/ANXIOUS: 1
COUGH: 0
FOCAL WEAKNESS: 0
SENSORY CHANGE: 0
WHEEZING: 0
HEADACHES: 0
TREMORS: 0
SHORTNESS OF BREATH: 0
PND: 0
BACK PAIN: 0
DIZZINESS: 0
SORE THROAT: 0
MYALGIAS: 1
HALLUCINATIONS: 0
CHILLS: 0
DOUBLE VISION: 0

## 2020-08-13 NOTE — PROGRESS NOTES
Hospital Medicine Daily Progress Note    Date of Service  8/13/2020    Chief Complaint  69 y.o. male admitted 7/25/2020 with scrotal swelling and pain    Hospital Course    History of coronary artery disease status post stent in the past, type 2 diabetes, paroxysmal atrial fibrillation on Eliquis, polycythemia on hydroxyurea, SLE on Imuran, admitted for scrotal pain and swelling found to have a right scrotal abscess and Landon's gangrene.  Urology was emergently consulted and completed a washout on 725.  Since then, he has undergone 3 additional washouts on 7/29, 7/31, 8/3.  He has a wound VAC in place and is having significant discomfort with dressing changes.  Overall, infectious markers have improved.  Cultures grew Candida albicans therefore Diflucan has been added to his antibiotic regimen.      Interval Problem Update  8/11: Tolerating antibiotics, gabapentin is helping with shooting nerve pain.  Still requiring IV Ativan and Dilaudid prior to dressing changes.  Disposition pending, needs to work with PT OT.  8/12: Tolerating antibiotics, medicated for dressing change today.  Gabapentin increased.  Glucose is well controlled in the 150s to 160s.  8/13: Feels drowsy today, pain control has improved significantly.  Glucose is at goal.  MS Contin discontinued, hydroxyurea dose increased to 1000 daily.  I discussed the case with Dr. Sims for dose recommendations    Consultants/Specialty  Dr. kidd, urology    Code Status  Full Code    Disposition  SNF versus LTAC    Review of Systems  Review of Systems   Constitutional: Positive for malaise/fatigue (From narcotics). Negative for chills and fever.        Comfortable, but very drowsy today   HENT: Negative for hearing loss, sore throat and tinnitus.    Eyes: Negative for blurred vision and double vision.   Respiratory: Negative for cough, shortness of breath and wheezing.    Cardiovascular: Negative for chest pain, leg swelling and PND.   Gastrointestinal:  Negative for abdominal pain, blood in stool, diarrhea and nausea.   Genitourinary: Negative for dysuria, frequency and hematuria.        Shooting pain in R testicle, radiating into abdomen.  Gabapentin helps with this.   Musculoskeletal: Positive for myalgias. Negative for back pain, falls and joint pain.   Neurological: Positive for speech change and weakness (Working on leg exercises while lying in bed). Negative for dizziness, tremors, sensory change, focal weakness and headaches.   Psychiatric/Behavioral: Negative for depression and hallucinations. The patient is nervous/anxious.    All other systems reviewed and are negative.       Physical Exam  Temp:  [36.4 °C (97.5 °F)-36.7 °C (98 °F)] 36.5 °C (97.7 °F)  Pulse:  [64-74] 67  Resp:  [18] 18  BP: (118-142)/(67-75) 142/75  SpO2:  [94 %-100 %] 96 %    Physical Exam  Constitutional:       Appearance: Normal appearance. He is ill-appearing. He is not diaphoretic.      Comments: Drowsy   HENT:      Head: Normocephalic and atraumatic.      Nose: Nose normal.      Mouth/Throat:      Mouth: Mucous membranes are dry.   Eyes:      Extraocular Movements: Extraocular movements intact.      Pupils: Pupils are equal, round, and reactive to light.   Neck:      Musculoskeletal: Normal range of motion and neck supple.   Cardiovascular:      Rate and Rhythm: Normal rate and regular rhythm.      Heart sounds: No murmur. No gallop.    Pulmonary:      Effort: Pulmonary effort is normal. No respiratory distress.      Breath sounds: Normal breath sounds. No stridor.   Abdominal:      General: Abdomen is flat. Bowel sounds are normal. There is no distension.      Palpations: Abdomen is soft.      Tenderness: There is no abdominal tenderness.   Genitourinary:     Comments: Edema and induration in the groin and perineum, no purulent drainage noted  Musculoskeletal:         General: No swelling, tenderness or deformity.   Skin:     General: Skin is warm and dry.      Coloration: Skin is  pale.   Neurological:      General: No focal deficit present.      Mental Status: He is oriented to person, place, and time. Mental status is at baseline.   Psychiatric:         Mood and Affect: Mood normal.         Behavior: Behavior is slowed.         Thought Content: Thought content normal.         Fluids    Intake/Output Summary (Last 24 hours) at 8/13/2020 1315  Last data filed at 8/13/2020 1151  Gross per 24 hour   Intake 700 ml   Output 1700 ml   Net -1000 ml       Laboratory  Recent Labs     08/11/20  0309 08/12/20  0348 08/13/20  1006   WBC 8.6 7.3 8.0   RBC 4.62* 4.78 4.97   HEMOGLOBIN 11.6* 11.7* 12.2*   HEMATOCRIT 38.9* 39.8* 41.2*   MCV 84.2 83.3 82.9   MCH 25.1* 24.5* 24.5*   MCHC 29.8* 29.4* 29.6*   RDW 55.9* 54.7* 55.6*   PLATELETCT 688* 751* 840*   MPV 11.1 11.0 10.8     Recent Labs     08/11/20  0309 08/12/20  0348   SODIUM 137 137   POTASSIUM 4.0 3.8   CHLORIDE 103 105   CO2 24 24   GLUCOSE 142* 127*   BUN 8 10   CREATININE 0.77 0.79   CALCIUM 8.4 8.2*                   Imaging  IR-MIDLINE CATHETER INSERTION WO GUIDANCE > AGE 3   Final Result                  Ultrasound-guided midline placement performed by qualified nursing staff    as above.          DX-CHEST-PORTABLE (1 VIEW)   Final Result         1. No acute cardiopulmonary abnormalities are identified.      CT-ABDOMEN-PELVIS WITH   Final Result      1.  Diffuse scrotal edema, eccentric to the RIGHT with extensive soft tissue gas present particularly in the RIGHT groin and hemiscrotum, consistent with Landon's gangrene.   2.  No discrete drainable abscess demonstrated.   3.  Minimal retroperitoneal adenopathy, nonspecific.   4.  Nonobstructing LEFT kidney stone.   5.  Splenomegaly.   6.  Cystic lesion in the pancreatic tail measuring 19 mm.  Neoplasm is not excluded.  Consider further evaluation with MRI.      These findings were discussed with AYALA HAMPTON on 7/25/2020 3:41 PM.      DX-CHEST-PORTABLE (1 VIEW)   Final Result      1.   There is no acute cardiopulmonary process.           Assessment/Plan  Stenosis of right carotid artery-Right CEA 3/21/18  Assessment & Plan  Continue aspirin, Eliquis  Statin allergy    * Landon's gangrene of scrotum  Assessment & Plan  Severe involving right testicle, status post multiple washouts by urology on 7/25, 7/29, 7/31, 8/3.  Pain control has been a challenge: gabapentin 400 TID  PRN PO oxycodone  DC MS contin (drowsy), IV ativan and IV morphine  Per ID: Continue unasyn and fluconazole-2-week antibiotic course post last surgery (end 8/17/20)  Transition to Augmentin, oral Diflucan on DC if prior to the above date  Monitor LFTs daily  Limit immunosuppression as much as possible to improve infection healing  Tight glucose control, goal is less than 150  Aggressive wound care and wound vac per surgery (placed 7/31, replaced 8/10)  PT/OT as able  Referral SNF vs LTAC pending    CAD (coronary artery disease)  Assessment & Plan  No acute events  Continue aspirin  Statin      Type 2 diabetes mellitus, with long-term current use of insulin ([facility])  Assessment & Plan  -Lantus 22 units- whs  -SSI      Paroxysmal A-fib ([facility])  Assessment & Plan  Eliquis has been resumed, continue as there are no further washouts planned at this point  -Rate controlled    Polycythemia  Assessment & Plan  I discussed the case with Dr. Sims of hematology today due to slightly rising platelets and history of MI when platelets were greater than 1 million in the past  Continue hydroxyurea, increase to 1000 mg nightly  Trend CBC with differential daily to monitor for leukopenia    Debility  Assessment & Plan  -Patient deconditioned status   PT OT  -Extensive RIGHT scrotal would - wound vac in place    Pancreatic cyst  Assessment & Plan  Follow up with imaging as outpatient      SLE (systemic lupus erythematosus related syndrome) ([facility])  Assessment & Plan  Prednisone on hold secondary to infection -he does not appear to have any evidence of  adrenal insufficiency  Has not had steroid since 7/31/2020         VTE prophylaxis: Eliquis  Total time:  40 minutes.  I spent greater than 50% of the time for patient care, counseling, and coordination on this date, including unit/floor time, and face-to-face time with the patient as per interval events and assessment and plan above

## 2020-08-13 NOTE — PROGRESS NOTES
Received report from noc shift nurse. Assumed pt care at 0715. Pt is A&Ox4, resting comfortably in bed. Pt on 2L NC. No signs of SOB/respiratory distress. Labs noted, VSS. Pt. Appears comfortable, sleeping in bed with unlabored breathing. Will return for morning meds and assessment. Fall precautions in place. Bed at lowest position. Call light and personal belongings within reach. Continue to monitor.

## 2020-08-13 NOTE — DISCHARGE PLANNING
Agency/Facility Name: [facility]   Spoke To: Patricia   Outcome: Per Patricia no bed is available for pt today, [facility] agrees to call back tomorrow.        @1005  Per LSW TOSIN Miles [facility] sent referral to [facility].       @1839  Sent referral to [facility].

## 2020-08-13 NOTE — CARE PLAN
Received report from day shift nurse.   Assumed pt care   Pt is A&Ox4, resting comfortably in bed.   Pt on 2L nasal canula. No signs of SOB/respiratory distress.   Labs noted, VSS.   Pt c/o  8/10 pain at this moment, medicated per MAR.   Assessment completed, wound vac to scrotum dressing C/D/I. Continuing prescribed treatment of antibiotics.   Fall precautions in place.   Bed at lowest position.   Call light and personal belongings within reach. Continue to monitor         Problem: Pain Management  Goal: Pain level will decrease to patient's comfort goal  Outcome: PROGRESSING AS EXPECTED     Problem: Infection  Goal: Will remain free from infection  Outcome: PROGRESSING AS EXPECTED

## 2020-08-13 NOTE — THERAPY
Physical Therapy   Daily Treatment     Patient Name: Francesco Schulte  Age:  69 y.o., Sex:  male  Medical Record #: [id number]  Today's Date: 8/13/2020     Precautions: Fall Risk    Assessment    Pt doing a lot better today with transfers and ambulation Pt is safe to ambulate with nursing    Plan    Continue current treatment plan.      08/13/20 1400   Balance   Sitting Balance (Static) Good   Sitting Balance (Dynamic) Fair +   Standing Balance (Static) Fair +   Standing Balance (Dynamic) Fair   Weight Shift Sitting Fair   Weight Shift Standing Good   Gait Analysis   Gait Level Of Assist Minimal Assist   Assistive Device Front Wheel Walker   Distance (Feet) 150   # of Times Distance was Traveled 1   Deviation Step To   # of Stairs Climbed 0   Weight Bearing Status full   Bed Mobility    Supine to Sit Supervised   Sit to Supine Supervised   Scooting Supervised   Functional Mobility   Sit to Stand Minimal Assist   Bed, Chair, Wheelchair Transfer Minimal Assist   Toilet Transfers Minimal Assist   Activity Tolerance   Sitting Edge of Bed 5   Standing 10   Short Term Goals    Goal Outcome # 1 Goal met   Goal Outcome # 2 Progressing as expected   Goal Outcome # 3 Progressing as expected   Goal Outcome # 4 Progressing as expected       DC Equipment Recommendations: Unable to determine at this time  Discharge Recommendations: Recommend post-acute placement for additional physical therapy services prior to discharge home

## 2020-08-13 NOTE — WOUND TEAM
[facility]  Inpatient Services  Wound and Skin Care Progress note    Admission Date: 7/25/2020     Last order of IP CONSULT TO WOUND CARE was found on 7/30/2020 from Hospital Encounter on 7/25/2020     HPI, PMH, SH: Reviewed    Unit where seen by Wound Team: 2215/01     WOUND CONSULT/FOLLOW-UP RELATED TO:  NPWT Change    Self Report / Pain Level:  Premedicated by primary RN Ativan then IV pain meds, lidocaine instilled in foam.    OBJECTIVE:  On pressure redistribution mattress, drsg intact. Co-treated with Paola wound RN    WOUND TYPE, LOCATION, CHARACTERISTICS (Pressure Injuries: location, stage, POA or date identified)       Negative Pressure Wound Therapy 07/27/20 Surgical (Active)   NPWT Pump Mode / Pressure Setting Ulta;125 mmHg    Dressing Type Medium;Black Foam (Veraflo)    Number of Foam Pieces Used 1    Canister Changed No    NEXT Dressing Change/Treatment Date 08/14/20    VAC VeraFlo Irrigant Normal Saline    VAC VeraFlo Soak Time (mins) 4    VAC VeraFlo Instill Volume (ml) 16    VAC VeraFlo - Therapy Time (hrs) 2.5    VAC VeraFlo Pressure (mm/Hg) 125 mmHg            Wound 07/25/20 Full Thickness Wound Scrotum Right (Active)   Wound Image     08/10/20 1645   Site Assessment Red;Drainage;Granulation tissue    Periwound Assessment Pink;Red;Dry    Margins Epibole (rolled edges)    Closure Secondary intention    Drainage Amount Small    Drainage Description Serosanguineous    Treatments Cleansed;Site care    Wound Cleansing Normal Saline Irrigation    Periwound Protectant Skin Protectant Wipes to Periwound;Paste Ring    Dressing Cleansing/Solutions Normal Saline    Dressing Options Wound Vac    Dressing Changed Changed    Dressing Status Intact    Dressing Change/Treatment Frequency Monday, Wednesday, Friday, and As Needed    NEXT Dressing Change/Treatment Date 08/14/20    NEXT Weekly Photo (Inpatient Only) 08/17/20    Non-staged Wound Description Full thickness    Wound Length (cm) 6 cm     Wound Width (cm) 10 cm    Wound Depth (cm) 4 cm    Wound Surface Area (cm^2) 60 cm^2    Wound Volume (cm^3) 240 cm^3    Wound Healing % -439    Wound Bed Granulation (%) 95 %    Tunneling (cm) 5.5 cm    Tunneling Clock Position of Wound 11    Shape irregualr    Wound Odor None    Pulses N/A    Exposed Structures Other (Comments)           Vascular:    JW:   No results found.      Lab Values:    Lab Results   Component Value Date/Time    WBC 7.3 08/12/2020 03:48 AM    RBC 4.78 08/12/2020 03:48 AM    HEMOGLOBIN 11.7 (L) 08/12/2020 03:48 AM    HEMATOCRIT 39.8 (L) 08/12/2020 03:48 AM    CREACTPROT 0.58 07/01/2020 07:03 AM    SEDRATEWES 15 07/01/2020 07:03 AM    HBA1C 8.6 (A) 01/07/2020 07:14 AM          Culture Results show:  Recent Results (from the past 720 hour(s))   CULTURE WOUND W/ GRAM STAIN    Collection Time: 07/31/20  6:45 PM    Specimen: Wound   Result Value Ref Range    Significant Indicator POS (POS)     Source WND     Site SCROTAL ABSCESS     Culture Result (A)      Growth noted after further incubation, see below for  organism identification.      Gram Stain Result       Many WBCs.  Rare Gram positive cocci.  Rare Yeast.      Culture Result Candida albicans  Rare growth   (A)          INTERVENTIONS BY WOUND TEAM:  RN gave pt Ativan. Soaked drsg with NS, then instilled 1/2 of lidocaine. Prepared drsg materials. Removed drsg with adhesive remover, cleaned wound with NS, dried. Applied lidocaine to wound bed. No sting and 2 paste ring to sharmaine-wound . Adaptic around testicle. One piece of medium Veraflow foam to wound. Sealed and Vera deep resumed @ 16 ml NS instillation for 4 min dwell and NPWT @ 125 mmHg continuous for 2.5 H. Dirty chucks changed and pt repositioned.    Interdisciplinary consultation: Patient,  Will RN, Guevara wound RN    EVALUATION: Wound continued to improve in depth and appearance. Pain better controlled today. Continued NPWT to assist with drainage and growth of granular tissue.    Goals:  Steady decrease in wound area and depth weekly.    NURSING PLAN OF CARE ORDERS (X):     Dressing changes: Continue previous Dressing Maintenance orders:   x     See new Dressing Maintenance orders:       Skin care: See Skin Care orders:        Rectal tube care: See Rectal Tube Care orders:      Other orders:           WOUND TEAM PLAN OF CARE:   Dressing changes by wound team:          Follow up 1-2 times weekly:               Follow up 3 times weekly:                NPWT change 3 times weekly:  x   Follow up as needed:       Other (explain):     Anticipated discharge plans:   LTACH:        SNF/Rehab:                  Home Care:           Outpatient Wound Center:            Self Care:           Other: has VAC and supplies in room-if pt going to [facility] may need to return to [facility]

## 2020-08-14 LAB
ALBUMIN SERPL BCP-MCNC: 2.4 G/DL (ref 3.2–4.9)
BASOPHILS # BLD AUTO: 0 % (ref 0–1.8)
BASOPHILS # BLD: 0 K/UL (ref 0–0.12)
BUN SERPL-MCNC: 9 MG/DL (ref 8–22)
CALCIUM SERPL-MCNC: 8.7 MG/DL (ref 8.4–10.2)
CHLORIDE SERPL-SCNC: 105 MMOL/L (ref 96–112)
CO2 SERPL-SCNC: 27 MMOL/L (ref 20–33)
CREAT SERPL-MCNC: 0.86 MG/DL (ref 0.5–1.4)
EOSINOPHIL # BLD AUTO: 0.26 K/UL (ref 0–0.51)
EOSINOPHIL NFR BLD: 4 % (ref 0–6.9)
ERYTHROCYTE [DISTWIDTH] IN BLOOD BY AUTOMATED COUNT: 55 FL (ref 35.9–50)
GLUCOSE BLD-MCNC: 100 MG/DL (ref 65–99)
GLUCOSE BLD-MCNC: 115 MG/DL (ref 65–99)
GLUCOSE BLD-MCNC: 140 MG/DL (ref 65–99)
GLUCOSE BLD-MCNC: 170 MG/DL (ref 65–99)
GLUCOSE SERPL-MCNC: 137 MG/DL (ref 65–99)
HCT VFR BLD AUTO: 41.7 % (ref 42–52)
HGB BLD-MCNC: 12.4 G/DL (ref 14–18)
HYPOCHROMIA BLD QL SMEAR: ABNORMAL
LG PLATELETS BLD QL SMEAR: NORMAL
LYMPHOCYTES # BLD AUTO: 1.25 K/UL (ref 1–4.8)
LYMPHOCYTES NFR BLD: 19 % (ref 22–41)
MAGNESIUM SERPL-MCNC: 1.9 MG/DL (ref 1.5–2.5)
MANUAL DIFF BLD: NORMAL
MCH RBC QN AUTO: 24.7 PG (ref 27–33)
MCHC RBC AUTO-ENTMCNC: 29.7 G/DL (ref 33.7–35.3)
MCV RBC AUTO: 83.1 FL (ref 81.4–97.8)
MICROCYTES BLD QL SMEAR: ABNORMAL
MONOCYTES # BLD AUTO: 0.26 K/UL (ref 0–0.85)
MONOCYTES NFR BLD AUTO: 4 % (ref 0–13.4)
NEUTROPHILS # BLD AUTO: 4.82 K/UL (ref 1.82–7.42)
NEUTROPHILS NFR BLD: 72 % (ref 44–72)
NEUTS BAND NFR BLD MANUAL: 1 % (ref 0–10)
NRBC # BLD AUTO: 0 K/UL
NRBC BLD-RTO: 0 /100 WBC
OVALOCYTES BLD QL SMEAR: NORMAL
PHOSPHATE SERPL-MCNC: 3.7 MG/DL (ref 2.5–4.5)
PLATELET # BLD AUTO: 799 K/UL (ref 164–446)
PLATELET BLD QL SMEAR: NORMAL
PMV BLD AUTO: 10.7 FL (ref 9–12.9)
POIKILOCYTOSIS BLD QL SMEAR: NORMAL
POTASSIUM SERPL-SCNC: 3.7 MMOL/L (ref 3.6–5.5)
RBC # BLD AUTO: 5.02 M/UL (ref 4.7–6.1)
RBC BLD AUTO: PRESENT
SODIUM SERPL-SCNC: 140 MMOL/L (ref 135–145)
TOXIC GRANULES BLD QL SMEAR: SLIGHT
VARIANT LYMPHS BLD QL SMEAR: NORMAL
WBC # BLD AUTO: 6.6 K/UL (ref 4.8–10.8)

## 2020-08-14 PROCEDURE — 85007 BL SMEAR W/DIFF WBC COUNT: CPT

## 2020-08-14 PROCEDURE — 770006 HCHG ROOM/CARE - MED/SURG/GYN SEMI*

## 2020-08-14 PROCEDURE — 700101 HCHG RX REV CODE 250: Performed by: INTERNAL MEDICINE

## 2020-08-14 PROCEDURE — 700111 HCHG RX REV CODE 636 W/ 250 OVERRIDE (IP): Performed by: INTERNAL MEDICINE

## 2020-08-14 PROCEDURE — A9270 NON-COVERED ITEM OR SERVICE: HCPCS | Performed by: HOSPITALIST

## 2020-08-14 PROCEDURE — 80069 RENAL FUNCTION PANEL: CPT

## 2020-08-14 PROCEDURE — 700102 HCHG RX REV CODE 250 W/ 637 OVERRIDE(OP): Performed by: INTERNAL MEDICINE

## 2020-08-14 PROCEDURE — A9270 NON-COVERED ITEM OR SERVICE: HCPCS | Performed by: NURSE PRACTITIONER

## 2020-08-14 PROCEDURE — 85027 COMPLETE CBC AUTOMATED: CPT

## 2020-08-14 PROCEDURE — 36415 COLL VENOUS BLD VENIPUNCTURE: CPT

## 2020-08-14 PROCEDURE — 82962 GLUCOSE BLOOD TEST: CPT | Mod: 91

## 2020-08-14 PROCEDURE — 97606 NEG PRS WND THER DME>50 SQCM: CPT

## 2020-08-14 PROCEDURE — 99232 SBSQ HOSP IP/OBS MODERATE 35: CPT | Performed by: INTERNAL MEDICINE

## 2020-08-14 PROCEDURE — A9270 NON-COVERED ITEM OR SERVICE: HCPCS | Performed by: INTERNAL MEDICINE

## 2020-08-14 PROCEDURE — 700105 HCHG RX REV CODE 258: Performed by: INTERNAL MEDICINE

## 2020-08-14 PROCEDURE — 700111 HCHG RX REV CODE 636 W/ 250 OVERRIDE (IP): Performed by: NURSE PRACTITIONER

## 2020-08-14 PROCEDURE — 83735 ASSAY OF MAGNESIUM: CPT

## 2020-08-14 PROCEDURE — 700102 HCHG RX REV CODE 250 W/ 637 OVERRIDE(OP): Performed by: HOSPITALIST

## 2020-08-14 PROCEDURE — 700102 HCHG RX REV CODE 250 W/ 637 OVERRIDE(OP): Performed by: NURSE PRACTITIONER

## 2020-08-14 RX ORDER — HYDROMORPHONE HYDROCHLORIDE 1 MG/ML
1 INJECTION, SOLUTION INTRAMUSCULAR; INTRAVENOUS; SUBCUTANEOUS ONCE
Status: COMPLETED | OUTPATIENT
Start: 2020-08-14 | End: 2020-08-14

## 2020-08-14 RX ORDER — MORPHINE SULFATE 15 MG/1
15 TABLET, FILM COATED, EXTENDED RELEASE ORAL EVERY 12 HOURS
Status: DISCONTINUED | OUTPATIENT
Start: 2020-08-14 | End: 2020-08-19

## 2020-08-14 RX ADMIN — SENNOSIDES-DOCUSATE SODIUM TAB 8.6-50 MG 2 TABLET: 8.6-5 TAB at 17:07

## 2020-08-14 RX ADMIN — AMPICILLIN SODIUM AND SULBACTAM SODIUM 3 G: 2; 1 INJECTION, POWDER, FOR SOLUTION INTRAMUSCULAR; INTRAVENOUS at 00:42

## 2020-08-14 RX ADMIN — MORPHINE SULFATE 15 MG: 15 TABLET, EXTENDED RELEASE ORAL at 18:12

## 2020-08-14 RX ADMIN — SENNOSIDES-DOCUSATE SODIUM TAB 8.6-50 MG 2 TABLET: 8.6-5 TAB at 06:23

## 2020-08-14 RX ADMIN — AZATHIOPRINE 50 MG: 50 TABLET ORAL at 17:48

## 2020-08-14 RX ADMIN — OMEPRAZOLE 20 MG: 20 CAPSULE, DELAYED RELEASE ORAL at 06:23

## 2020-08-14 RX ADMIN — OXYCODONE HYDROCHLORIDE 10 MG: 5 TABLET ORAL at 16:48

## 2020-08-14 RX ADMIN — OXYCODONE HYDROCHLORIDE 10 MG: 5 TABLET ORAL at 06:23

## 2020-08-14 RX ADMIN — AMPICILLIN SODIUM AND SULBACTAM SODIUM 3 G: 2; 1 INJECTION, POWDER, FOR SOLUTION INTRAMUSCULAR; INTRAVENOUS at 06:08

## 2020-08-14 RX ADMIN — OXYCODONE HYDROCHLORIDE 10 MG: 5 TABLET ORAL at 10:21

## 2020-08-14 RX ADMIN — MORPHINE SULFATE 4 MG: 4 INJECTION INTRAVENOUS at 12:00

## 2020-08-14 RX ADMIN — APIXABAN 5 MG: 5 TABLET, FILM COATED ORAL at 06:23

## 2020-08-14 RX ADMIN — GABAPENTIN 400 MG: 400 CAPSULE ORAL at 06:23

## 2020-08-14 RX ADMIN — OXYCODONE HYDROCHLORIDE 10 MG: 5 TABLET ORAL at 02:22

## 2020-08-14 RX ADMIN — APIXABAN 5 MG: 5 TABLET, FILM COATED ORAL at 17:48

## 2020-08-14 RX ADMIN — AMPICILLIN SODIUM AND SULBACTAM SODIUM 3 G: 2; 1 INJECTION, POWDER, FOR SOLUTION INTRAMUSCULAR; INTRAVENOUS at 11:32

## 2020-08-14 RX ADMIN — GABAPENTIN 400 MG: 400 CAPSULE ORAL at 17:07

## 2020-08-14 RX ADMIN — GABAPENTIN 400 MG: 400 CAPSULE ORAL at 11:32

## 2020-08-14 RX ADMIN — HYDROMORPHONE HYDROCHLORIDE 1 MG: 1 INJECTION, SOLUTION INTRAMUSCULAR; INTRAVENOUS; SUBCUTANEOUS at 13:38

## 2020-08-14 RX ADMIN — AZATHIOPRINE 50 MG: 50 TABLET ORAL at 06:23

## 2020-08-14 RX ADMIN — ASPIRIN 81 MG: 81 TABLET, COATED ORAL at 06:23

## 2020-08-14 RX ADMIN — AMPICILLIN SODIUM AND SULBACTAM SODIUM 3 G: 2; 1 INJECTION, POWDER, FOR SOLUTION INTRAMUSCULAR; INTRAVENOUS at 17:49

## 2020-08-14 RX ADMIN — THERA TABS 1 TABLET: TAB at 06:23

## 2020-08-14 RX ADMIN — LIDOCAINE HYDROCHLORIDE 20 ML: 20 INJECTION, SOLUTION INFILTRATION; PERINEURAL at 12:49

## 2020-08-14 RX ADMIN — INSULIN LISPRO 3 UNITS: 100 INJECTION, SOLUTION INTRAVENOUS; SUBCUTANEOUS at 11:46

## 2020-08-14 RX ADMIN — HYDROXYUREA 1000 MG: 500 CAPSULE ORAL at 21:28

## 2020-08-14 RX ADMIN — FLUCONAZOLE 200 MG: 200 TABLET ORAL at 06:23

## 2020-08-14 RX ADMIN — HYDROMORPHONE HYDROCHLORIDE 1 MG: 1 INJECTION, SOLUTION INTRAMUSCULAR; INTRAVENOUS; SUBCUTANEOUS at 13:00

## 2020-08-14 ASSESSMENT — ENCOUNTER SYMPTOMS
ABDOMINAL PAIN: 0
NAUSEA: 0
WHEEZING: 0
CHILLS: 0
PND: 0
SORE THROAT: 0
MYALGIAS: 1
DIARRHEA: 0
SPEECH CHANGE: 1
HEADACHES: 0
FOCAL WEAKNESS: 0
FALLS: 0
NERVOUS/ANXIOUS: 1
SEIZURES: 0
DEPRESSION: 0
BLURRED VISION: 0
SENSORY CHANGE: 0
LOSS OF CONSCIOUSNESS: 0
COUGH: 0
TREMORS: 0
BACK PAIN: 0
HALLUCINATIONS: 0
DIZZINESS: 0
SHORTNESS OF BREATH: 0
WEAKNESS: 1
FEVER: 0
DOUBLE VISION: 0
BLOOD IN STOOL: 0

## 2020-08-14 NOTE — PROGRESS NOTES
Hospital Medicine Daily Progress Note    Date of Service  8/14/2020    Chief Complaint  69 y.o. male admitted 7/25/2020 with scrotal swelling and pain    Hospital Course    History of coronary artery disease status post stent in the past, type 2 diabetes, paroxysmal atrial fibrillation on Eliquis, polycythemia on hydroxyurea, SLE on Imuran, admitted for scrotal pain and swelling found to have a right scrotal abscess and Landon's gangrene.  Urology was emergently consulted and completed a washout on 725.  Since then, he has undergone 3 additional washouts on 7/29, 7/31, 8/3.  He has a wound VAC in place and is having significant discomfort with dressing changes.  Overall, infectious markers have improved.  Cultures grew Candida albicans therefore Diflucan has been added to his antibiotic regimen.      Interval Problem Update  8/11: Tolerating antibiotics, gabapentin is helping with shooting nerve pain.  Still requiring IV Ativan and Dilaudid prior to dressing changes.  Disposition pending, needs to work with PT OT.  8/12: Tolerating antibiotics, medicated for dressing change today.  Gabapentin increased.  Glucose is well controlled in the 150s to 160s.  8/13: Feels drowsy today, pain control has improved significantly.  Glucose is at goal.  MS Contin discontinued, hydroxyurea dose increased to 1000 daily.  I discussed the case with Dr. Sims for dose recommendations  8/14: Poor pain control overnight after MS Contin was discontinued, resumed.  Platelets improved with increased hydroxyurea dose.  Still with significant pain with dressing changes.  Tolerated abx    Consultants/Specialty  Dr. kidd, urology  Dr. Sims - by phone    Code Status  Full Code    Disposition  SNF versus LTAC    Review of Systems  Review of Systems   Constitutional: Positive for malaise/fatigue (From narcotics). Negative for chills and fever.        Comfortable, but very drowsy today   HENT: Negative for hearing loss, sore throat and  tinnitus.    Eyes: Negative for blurred vision and double vision.   Respiratory: Negative for cough, shortness of breath and wheezing.    Cardiovascular: Negative for chest pain, leg swelling and PND.   Gastrointestinal: Negative for abdominal pain, blood in stool, diarrhea and nausea.   Genitourinary: Negative for dysuria, frequency, hematuria and urgency.        Shooting pain in R testicle, radiating into abdomen.  Gabapentin helps with this.   Musculoskeletal: Positive for myalgias (Severe overnight). Negative for back pain, falls and joint pain.   Neurological: Positive for speech change and weakness. Negative for dizziness, tremors, sensory change, focal weakness, seizures, loss of consciousness and headaches.   Psychiatric/Behavioral: Negative for depression and hallucinations. The patient is nervous/anxious.    All other systems reviewed and are negative.       Physical Exam  Temp:  [36.3 °C (97.4 °F)-36.7 °C (98.1 °F)] 36.5 °C (97.7 °F)  Pulse:  [63-92] 63  Resp:  [16-18] 16  BP: (113-139)/(54-80) 113/62  SpO2:  [95 %-99 %] 99 %    Physical Exam  Constitutional:       Appearance: Normal appearance. He is not ill-appearing or diaphoretic.      Comments: LIstless, weak   HENT:      Head: Normocephalic and atraumatic.      Nose: Nose normal.      Mouth/Throat:      Mouth: Mucous membranes are dry.   Eyes:      General:         Left eye: Left eye discharge: .recent.     Extraocular Movements: Extraocular movements intact.      Pupils: Pupils are equal, round, and reactive to light.   Neck:      Musculoskeletal: Normal range of motion and neck supple.   Cardiovascular:      Rate and Rhythm: Normal rate and regular rhythm.      Heart sounds: No murmur. No gallop.    Pulmonary:      Effort: Pulmonary effort is normal. No respiratory distress.      Breath sounds: Normal breath sounds. No stridor.   Abdominal:      General: Abdomen is flat. Bowel sounds are normal. There is no distension.      Palpations: Abdomen is  soft.      Tenderness: There is no abdominal tenderness.   Genitourinary:     Comments: Edema and induration in the groin and perineum  Musculoskeletal:         General: No swelling, tenderness or deformity.   Skin:     General: Skin is warm and dry.      Coloration: Skin is pale.   Neurological:      General: No focal deficit present.      Mental Status: He is oriented to person, place, and time. Mental status is at baseline.   Psychiatric:         Mood and Affect: Mood normal.         Behavior: Behavior is slowed.         Thought Content: Thought content normal.      Comments: Tangential         Fluids    Intake/Output Summary (Last 24 hours) at 8/14/2020 1335  Last data filed at 8/14/2020 1227  Gross per 24 hour   Intake 340 ml   Output 2700 ml   Net -2360 ml       Laboratory  Recent Labs     08/12/20  0348 08/13/20  1006 08/14/20  0424   WBC 7.3 8.0 6.6   RBC 4.78 4.97 5.02   HEMOGLOBIN 11.7* 12.2* 12.4*   HEMATOCRIT 39.8* 41.2* 41.7*   MCV 83.3 82.9 83.1   MCH 24.5* 24.5* 24.7*   MCHC 29.4* 29.6* 29.7*   RDW 54.7* 55.6* 55.0*   PLATELETCT 751* 840* 799*   MPV 11.0 10.8 10.7     Recent Labs     08/12/20  0348 08/14/20  0424   SODIUM 137 140   POTASSIUM 3.8 3.7   CHLORIDE 105 105   CO2 24 27   GLUCOSE 127* 137*   BUN 10 9   CREATININE 0.79 0.86   CALCIUM 8.2* 8.7                   Imaging  IR-MIDLINE CATHETER INSERTION WO GUIDANCE > AGE 3   Final Result                  Ultrasound-guided midline placement performed by qualified nursing staff    as above.          DX-CHEST-PORTABLE (1 VIEW)   Final Result         1. No acute cardiopulmonary abnormalities are identified.      CT-ABDOMEN-PELVIS WITH   Final Result      1.  Diffuse scrotal edema, eccentric to the RIGHT with extensive soft tissue gas present particularly in the RIGHT groin and hemiscrotum, consistent with Landon's gangrene.   2.  No discrete drainable abscess demonstrated.   3.  Minimal retroperitoneal adenopathy, nonspecific.   4.  Nonobstructing  LEFT kidney stone.   5.  Splenomegaly.   6.  Cystic lesion in the pancreatic tail measuring 19 mm.  Neoplasm is not excluded.  Consider further evaluation with MRI.      These findings were discussed with AYALA HAMPTON on 7/25/2020 3:41 PM.      DX-CHEST-PORTABLE (1 VIEW)   Final Result      1.  There is no acute cardiopulmonary process.           Assessment/Plan  Stenosis of right carotid artery-Right CEA 3/21/18  Assessment & Plan  Continue aspirin, Eliquis  Statin allergy    * Landon's gangrene of scrotum  Assessment & Plan  Severe involving right testicle, status post multiple washouts by urology on 7/25, 7/29, 7/31, 8/3.  Pain control has been a challenge:   Continue abapentin 400 TID, Resume MS contin, oxycodone PRN, IV morphine PRN  For dressing changes: IV dilaudid  Per ID: Continue unasyn and fluconazole-2-week antibiotic course post last surgery (end 8/17/20)  Transition to Augmentin, oral Diflucan on DC if prior to the above date  Monitor LFTs daily  Limit immunosuppression as much as possible to improve infection healing  Tight glucose control, goal is less than 150  Aggressive wound care  PT/OT as able  Referral SNF vs LTAC pending    CAD (coronary artery disease)  Assessment & Plan  No acute events  Continue aspirin  Statin      Type 2 diabetes mellitus, with long-term current use of insulin (HCC)  Assessment & Plan  -Lantus 22 units- whs  -SSI      Paroxysmal A-fib (HCC)  Assessment & Plan  Continue Eliquis, as there are no further washouts planned at this point  -Rate controlled    Polycythemia  Assessment & Plan  I discussed the case with Dr. Sims of hematology as patient has a history of MI when platelets were greater than 1 million in the past  Continue hydroxyurea at 1000 mg nightly, plt have improved  Trend CBC with differential daily to monitor for leukopenia    Debility  Assessment & Plan  -Patient deconditioned status   PT OT  -Extensive RIGHT scrotal would    Pancreatic cyst  Assessment  & Plan  Follow up with imaging as outpatient      SLE (systemic lupus erythematosus related syndrome) (HCC)  Assessment & Plan  Prednisone on hold secondary to infection -he does not appear to have any evidence of adrenal insufficiency  Has not had steroid since 7/31/2020             VTE prophylaxis: Eliquis

## 2020-08-14 NOTE — DISCHARGE PLANNING
Agency/Facility Name: [facility]   Spoke To: Patricia   Outcome: Unsure if bed will be available for this pt today, Patricia agrees to f/u with [facility] with any updates.        @[id number]  Met with [facility] liaison Will in office, [facility] provided with progress notes and insurance information on pt per request. Will agrees to review and f/u with [facility].

## 2020-08-14 NOTE — PROGRESS NOTES
Received report from noc shift nurse. Assumed pt care at 0715. Pt is A&Ox4, resting comfortably in bed. Pt on 2L O2 via nasal cannula. No signs of SOB/respiratory distress. Labs noted, VSS. Pt is sleeping at this moment with unlabored breathing. Will return for morning meds and assessment. Fall precautions in place. Bed at lowest position. Call light and personal belongings within reach. Continue to monitor

## 2020-08-14 NOTE — DISCHARGE PLANNING
Agency/Facility Name: [facility]   Spoke To: Danny   Outcome: Per Danny pt has end date for antibiotics set for 08/17 so he can not be accepted to [facility] at this time, if antibiotics are needed for 3 1/2 more weeks, we can resubmit referral.

## 2020-08-14 NOTE — PROGRESS NOTES
Pt resting in bed, A&Ox4, VSS, pain controlled, denies dizziness, nausea, or sob. POC discussed, denies needs at this time. Wound dressing c/d/i to wound vac. Safety measures and hourly rounding in place.

## 2020-08-14 NOTE — DISCHARGE PLANNING
Anticipated Discharge Disposition: LT[facility]- [facility]    Action: Pt accepted by [facility]- [facility]. Leighann JOHNSON informed LSW that [facility] is currently pending bed availability.     Barriers to Discharge: None    Plan: Await bed availability, LSW to assist as needed

## 2020-08-15 PROBLEM — K59.00 CONSTIPATION: Status: ACTIVE | Noted: 2020-08-15

## 2020-08-15 LAB
ALBUMIN SERPL BCP-MCNC: 2.5 G/DL (ref 3.2–4.9)
BASOPHILS # BLD AUTO: 1.6 % (ref 0–1.8)
BASOPHILS # BLD: 0.14 K/UL (ref 0–0.12)
BUN SERPL-MCNC: 9 MG/DL (ref 8–22)
CALCIUM SERPL-MCNC: 8.8 MG/DL (ref 8.4–10.2)
CHLORIDE SERPL-SCNC: 107 MMOL/L (ref 96–112)
CO2 SERPL-SCNC: 27 MMOL/L (ref 20–33)
CREAT SERPL-MCNC: 0.89 MG/DL (ref 0.5–1.4)
EOSINOPHIL # BLD AUTO: 0.29 K/UL (ref 0–0.51)
EOSINOPHIL NFR BLD: 3.4 % (ref 0–6.9)
ERYTHROCYTE [DISTWIDTH] IN BLOOD BY AUTOMATED COUNT: 55 FL (ref 35.9–50)
GLUCOSE BLD-MCNC: 123 MG/DL (ref 65–99)
GLUCOSE BLD-MCNC: 155 MG/DL (ref 65–99)
GLUCOSE BLD-MCNC: 170 MG/DL (ref 65–99)
GLUCOSE SERPL-MCNC: 145 MG/DL (ref 65–99)
HCT VFR BLD AUTO: 40.5 % (ref 42–52)
HGB BLD-MCNC: 12 G/DL (ref 14–18)
IMM GRANULOCYTES # BLD AUTO: 0.19 K/UL (ref 0–0.11)
IMM GRANULOCYTES NFR BLD AUTO: 2.2 % (ref 0–0.9)
LYMPHOCYTES # BLD AUTO: 1.01 K/UL (ref 1–4.8)
LYMPHOCYTES NFR BLD: 11.7 % (ref 22–41)
MAGNESIUM SERPL-MCNC: 2 MG/DL (ref 1.5–2.5)
MCH RBC QN AUTO: 24.8 PG (ref 27–33)
MCHC RBC AUTO-ENTMCNC: 29.6 G/DL (ref 33.7–35.3)
MCV RBC AUTO: 83.7 FL (ref 81.4–97.8)
MONOCYTES # BLD AUTO: 0.54 K/UL (ref 0–0.85)
MONOCYTES NFR BLD AUTO: 6.3 % (ref 0–13.4)
NEUTROPHILS # BLD AUTO: 6.44 K/UL (ref 1.82–7.42)
NEUTROPHILS NFR BLD: 74.8 % (ref 44–72)
NRBC # BLD AUTO: 0 K/UL
NRBC BLD-RTO: 0 /100 WBC
PHOSPHATE SERPL-MCNC: 3.6 MG/DL (ref 2.5–4.5)
PLATELET # BLD AUTO: 914 K/UL (ref 164–446)
PMV BLD AUTO: 11 FL (ref 9–12.9)
POTASSIUM SERPL-SCNC: 3.7 MMOL/L (ref 3.6–5.5)
RBC # BLD AUTO: 4.84 M/UL (ref 4.7–6.1)
SODIUM SERPL-SCNC: 141 MMOL/L (ref 135–145)
WBC # BLD AUTO: 8.6 K/UL (ref 4.8–10.8)

## 2020-08-15 PROCEDURE — A9270 NON-COVERED ITEM OR SERVICE: HCPCS | Performed by: INTERNAL MEDICINE

## 2020-08-15 PROCEDURE — 700111 HCHG RX REV CODE 636 W/ 250 OVERRIDE (IP): Performed by: INTERNAL MEDICINE

## 2020-08-15 PROCEDURE — A9270 NON-COVERED ITEM OR SERVICE: HCPCS | Performed by: NURSE PRACTITIONER

## 2020-08-15 PROCEDURE — 97530 THERAPEUTIC ACTIVITIES: CPT

## 2020-08-15 PROCEDURE — 700102 HCHG RX REV CODE 250 W/ 637 OVERRIDE(OP): Performed by: NURSE PRACTITIONER

## 2020-08-15 PROCEDURE — 700102 HCHG RX REV CODE 250 W/ 637 OVERRIDE(OP): Performed by: HOSPITALIST

## 2020-08-15 PROCEDURE — 700102 HCHG RX REV CODE 250 W/ 637 OVERRIDE(OP): Performed by: INTERNAL MEDICINE

## 2020-08-15 PROCEDURE — A9270 NON-COVERED ITEM OR SERVICE: HCPCS | Performed by: HOSPITALIST

## 2020-08-15 PROCEDURE — 700105 HCHG RX REV CODE 258: Performed by: INTERNAL MEDICINE

## 2020-08-15 PROCEDURE — 80069 RENAL FUNCTION PANEL: CPT

## 2020-08-15 PROCEDURE — 99233 SBSQ HOSP IP/OBS HIGH 50: CPT | Performed by: INTERNAL MEDICINE

## 2020-08-15 PROCEDURE — 97535 SELF CARE MNGMENT TRAINING: CPT

## 2020-08-15 PROCEDURE — 700111 HCHG RX REV CODE 636 W/ 250 OVERRIDE (IP): Performed by: NURSE PRACTITIONER

## 2020-08-15 PROCEDURE — 82962 GLUCOSE BLOOD TEST: CPT | Mod: 91

## 2020-08-15 PROCEDURE — 85025 COMPLETE CBC W/AUTO DIFF WBC: CPT

## 2020-08-15 PROCEDURE — 770006 HCHG ROOM/CARE - MED/SURG/GYN SEMI*

## 2020-08-15 PROCEDURE — 36415 COLL VENOUS BLD VENIPUNCTURE: CPT

## 2020-08-15 PROCEDURE — 97110 THERAPEUTIC EXERCISES: CPT

## 2020-08-15 PROCEDURE — 83735 ASSAY OF MAGNESIUM: CPT

## 2020-08-15 RX ORDER — OXYCODONE HYDROCHLORIDE 5 MG/1
5 TABLET ORAL EVERY 6 HOURS PRN
Status: DISCONTINUED | OUTPATIENT
Start: 2020-08-15 | End: 2020-08-16

## 2020-08-15 RX ORDER — POLYETHYLENE GLYCOL 3350 17 G/17G
1 POWDER, FOR SOLUTION ORAL 2 TIMES DAILY
Status: DISCONTINUED | OUTPATIENT
Start: 2020-08-15 | End: 2020-08-19

## 2020-08-15 RX ORDER — OXYCODONE HYDROCHLORIDE 10 MG/1
10 TABLET ORAL EVERY 6 HOURS PRN
Status: DISCONTINUED | OUTPATIENT
Start: 2020-08-15 | End: 2020-08-16

## 2020-08-15 RX ORDER — BISACODYL 10 MG
10 SUPPOSITORY, RECTAL RECTAL
Status: DISCONTINUED | OUTPATIENT
Start: 2020-08-15 | End: 2020-08-19

## 2020-08-15 RX ORDER — HYDROXYUREA 500 MG/1
1500 CAPSULE ORAL NIGHTLY
Status: DISCONTINUED | OUTPATIENT
Start: 2020-08-15 | End: 2020-08-17

## 2020-08-15 RX ORDER — AMOXICILLIN 250 MG
2 CAPSULE ORAL 2 TIMES DAILY
Status: DISCONTINUED | OUTPATIENT
Start: 2020-08-15 | End: 2020-08-19

## 2020-08-15 RX ADMIN — OXYCODONE HYDROCHLORIDE 10 MG: 5 TABLET ORAL at 06:25

## 2020-08-15 RX ADMIN — AZATHIOPRINE 50 MG: 50 TABLET ORAL at 06:29

## 2020-08-15 RX ADMIN — INSULIN LISPRO 3 UNITS: 100 INJECTION, SOLUTION INTRAVENOUS; SUBCUTANEOUS at 11:14

## 2020-08-15 RX ADMIN — THERA TABS 1 TABLET: TAB at 06:19

## 2020-08-15 RX ADMIN — SENNOSIDES-DOCUSATE SODIUM TAB 8.6-50 MG 2 TABLET: 8.6-5 TAB at 06:19

## 2020-08-15 RX ADMIN — OMEPRAZOLE 20 MG: 20 CAPSULE, DELAYED RELEASE ORAL at 06:19

## 2020-08-15 RX ADMIN — MORPHINE SULFATE 15 MG: 15 TABLET, EXTENDED RELEASE ORAL at 06:19

## 2020-08-15 RX ADMIN — AMPICILLIN SODIUM AND SULBACTAM SODIUM 3 G: 2; 1 INJECTION, POWDER, FOR SOLUTION INTRAMUSCULAR; INTRAVENOUS at 11:38

## 2020-08-15 RX ADMIN — FLUCONAZOLE 200 MG: 200 TABLET ORAL at 06:19

## 2020-08-15 RX ADMIN — OXYCODONE HYDROCHLORIDE 10 MG: 10 TABLET ORAL at 21:45

## 2020-08-15 RX ADMIN — HYDROXYUREA 1500 MG: 500 CAPSULE ORAL at 21:42

## 2020-08-15 RX ADMIN — AMPICILLIN SODIUM AND SULBACTAM SODIUM 3 G: 2; 1 INJECTION, POWDER, FOR SOLUTION INTRAMUSCULAR; INTRAVENOUS at 00:08

## 2020-08-15 RX ADMIN — AZATHIOPRINE 50 MG: 50 TABLET ORAL at 17:24

## 2020-08-15 RX ADMIN — APIXABAN 5 MG: 5 TABLET, FILM COATED ORAL at 06:19

## 2020-08-15 RX ADMIN — OXYCODONE HYDROCHLORIDE 10 MG: 10 TABLET ORAL at 13:46

## 2020-08-15 RX ADMIN — GABAPENTIN 400 MG: 400 CAPSULE ORAL at 11:09

## 2020-08-15 RX ADMIN — GABAPENTIN 400 MG: 400 CAPSULE ORAL at 17:24

## 2020-08-15 RX ADMIN — ASPIRIN 81 MG: 81 TABLET, COATED ORAL at 06:19

## 2020-08-15 RX ADMIN — SENNOSIDES-DOCUSATE SODIUM TAB 8.6-50 MG 2 TABLET: 8.6-5 TAB at 17:24

## 2020-08-15 RX ADMIN — AMPICILLIN SODIUM AND SULBACTAM SODIUM 3 G: 2; 1 INJECTION, POWDER, FOR SOLUTION INTRAMUSCULAR; INTRAVENOUS at 06:15

## 2020-08-15 RX ADMIN — GABAPENTIN 400 MG: 400 CAPSULE ORAL at 06:19

## 2020-08-15 RX ADMIN — AMPICILLIN SODIUM AND SULBACTAM SODIUM 3 G: 2; 1 INJECTION, POWDER, FOR SOLUTION INTRAMUSCULAR; INTRAVENOUS at 17:30

## 2020-08-15 RX ADMIN — APIXABAN 5 MG: 5 TABLET, FILM COATED ORAL at 17:24

## 2020-08-15 RX ADMIN — MORPHINE SULFATE 15 MG: 15 TABLET, EXTENDED RELEASE ORAL at 17:24

## 2020-08-15 ASSESSMENT — ENCOUNTER SYMPTOMS
ABDOMINAL PAIN: 0
SENSORY CHANGE: 0
HALLUCINATIONS: 0
FALLS: 0
TREMORS: 0
FOCAL WEAKNESS: 0
HEADACHES: 0
PHOTOPHOBIA: 0
DEPRESSION: 0
BLURRED VISION: 0
SEIZURES: 0
DIZZINESS: 0
CHILLS: 0
BACK PAIN: 0
NAUSEA: 0
SHORTNESS OF BREATH: 0
DOUBLE VISION: 1
WEAKNESS: 1
PND: 0
NERVOUS/ANXIOUS: 1
BLOOD IN STOOL: 0
LOSS OF CONSCIOUSNESS: 0
WHEEZING: 0
TINGLING: 0
COUGH: 0
FEVER: 0
SPEECH CHANGE: 0
DIARRHEA: 0
MYALGIAS: 1
SORE THROAT: 0

## 2020-08-15 ASSESSMENT — COGNITIVE AND FUNCTIONAL STATUS - GENERAL
SUGGESTED CMS G CODE MODIFIER DAILY ACTIVITY: CK
TOILETING: A LOT
DRESSING REGULAR LOWER BODY CLOTHING: A LOT
HELP NEEDED FOR BATHING: A LOT
PERSONAL GROOMING: A LITTLE
DAILY ACTIVITIY SCORE: 16
DRESSING REGULAR UPPER BODY CLOTHING: A LITTLE

## 2020-08-15 NOTE — PROGRESS NOTES
Hospital Medicine Daily Progress Note    Date of Service  8/15/2020    Chief Complaint  69 y.o. male admitted 7/25/2020 with scrotal swelling and pain    Hospital Course    History of coronary artery disease status post stent in the past, type 2 diabetes, paroxysmal atrial fibrillation on Eliquis, polycythemia on hydroxyurea, SLE on Imuran, admitted for scrotal pain and swelling found to have a right scrotal abscess and Landon's gangrene.  Urology was emergently consulted and completed a washout on 725.  Since then, he has undergone 3 additional washouts on 7/29, 7/31, 8/3.  He has a wound VAC in place and is having significant discomfort with dressing changes.  Overall, infectious markers have improved.  Cultures grew Candida albicans therefore Diflucan has been added to his antibiotic regimen.      Interval Problem Update  8/11: Tolerating antibiotics, gabapentin is helping with shooting nerve pain.  Still requiring IV Ativan and Dilaudid prior to dressing changes.  Disposition pending, needs to work with PT OT.  8/12: Tolerating antibiotics, medicated for dressing change today.  Gabapentin increased.  Glucose is well controlled in the 150s to 160s.  8/13: Feels drowsy today, pain control has improved significantly.  Glucose is at goal.  MS Contin discontinued, hydroxyurea dose increased to 1000 daily.  I discussed the case with Dr. Sims for dose recommendations  8/14: Poor pain control overnight after MS Contin was discontinued, resumed.  Platelets improved with increased hydroxyurea dose.  Still with significant pain with dressing changes.  Tolerated abx  8/15: Platelets increased to 900s, hydroxyurea increased, repeat in AM.  C/O drowsiness and double vision.  Reduced narcotics, serial neuro exams    Consultants/Specialty  Dr. kidd, urology  Dr. Sims - by phone    Code Status  Full Code    Disposition  SNF versus LTAC    Review of Systems  Review of Systems   Constitutional: Negative for chills and fever.         Comfortable, but very drowsy today   HENT: Negative for hearing loss, sore throat and tinnitus.    Eyes: Positive for double vision. Negative for blurred vision and photophobia.   Respiratory: Negative for cough, shortness of breath and wheezing.    Cardiovascular: Negative for chest pain, leg swelling and PND.   Gastrointestinal: Negative for abdominal pain, blood in stool, diarrhea and nausea.   Genitourinary: Negative for dysuria, frequency, hematuria and urgency.        Shooting pain in R testicle, radiating into abdomen.  Gabapentin helps with this.   Musculoskeletal: Positive for myalgias (Severe overnight). Negative for back pain, falls and joint pain.   Neurological: Positive for weakness. Negative for dizziness, tingling, tremors, sensory change, speech change, focal weakness, seizures, loss of consciousness and headaches.   Psychiatric/Behavioral: Negative for depression and hallucinations. The patient is nervous/anxious.    All other systems reviewed and are negative.       Physical Exam  Temp:  [36.4 °C (97.6 °F)-37.1 °C (98.8 °F)] 37.1 °C (98.8 °F)  Pulse:  [67-71] 71  Resp:  [17-18] 17  BP: (103-142)/(61-88) 103/61  SpO2:  [97 %-100 %] 97 %    Physical Exam  Constitutional:       Appearance: Normal appearance. He is not ill-appearing or diaphoretic.      Comments: LIstless, weak   HENT:      Head: Normocephalic and atraumatic.      Nose: Nose normal.      Mouth/Throat:      Mouth: Mucous membranes are dry.   Eyes:      General: No scleral icterus.        Right eye: No discharge.         Left eye: No discharge (.recent).      Extraocular Movements: Extraocular movements intact.      Conjunctiva/sclera: Conjunctivae normal.      Pupils: Pupils are equal, round, and reactive to light.   Neck:      Musculoskeletal: Normal range of motion and neck supple.   Cardiovascular:      Rate and Rhythm: Normal rate and regular rhythm.      Heart sounds: No murmur. No gallop.    Pulmonary:      Effort:  Pulmonary effort is normal. No respiratory distress.      Breath sounds: Normal breath sounds. No stridor.   Abdominal:      General: Abdomen is flat. Bowel sounds are normal. There is no distension.      Palpations: Abdomen is soft.      Tenderness: There is no abdominal tenderness.   Genitourinary:     Comments: Edema and induration in the groin and perineum  Musculoskeletal:         General: No swelling, tenderness or deformity.   Skin:     General: Skin is warm and dry.      Coloration: Skin is pale.   Neurological:      General: No focal deficit present.      Mental Status: He is oriented to person, place, and time. Mental status is at baseline.      Cranial Nerves: No cranial nerve deficit.      Coordination: Coordination normal.      Comments: Eyes track symmetrically, pupils equal   Psychiatric:         Mood and Affect: Mood normal.         Behavior: Behavior is slowed.         Thought Content: Thought content normal.      Comments: Tangential         Fluids    Intake/Output Summary (Last 24 hours) at 8/15/2020 1111  Last data filed at 8/15/2020 0830  Gross per 24 hour   Intake 880 ml   Output 2865 ml   Net -1985 ml       Laboratory  Recent Labs     08/13/20  1006 08/14/20  0424 08/15/20  0408   WBC 8.0 6.6 8.6   RBC 4.97 5.02 4.84   HEMOGLOBIN 12.2* 12.4* 12.0*   HEMATOCRIT 41.2* 41.7* 40.5*   MCV 82.9 83.1 83.7   MCH 24.5* 24.7* 24.8*   MCHC 29.6* 29.7* 29.6*   RDW 55.6* 55.0* 55.0*   PLATELETCT 840* 799* 914*   MPV 10.8 10.7 11.0     Recent Labs     08/14/20  0424 08/15/20  0408   SODIUM 140 141   POTASSIUM 3.7 3.7   CHLORIDE 105 107   CO2 27 27   GLUCOSE 137* 145*   BUN 9 9   CREATININE 0.86 0.89   CALCIUM 8.7 8.8                   Imaging  IR-MIDLINE CATHETER INSERTION WO GUIDANCE > AGE 3   Final Result                  Ultrasound-guided midline placement performed by qualified nursing staff    as above.          DX-CHEST-PORTABLE (1 VIEW)   Final Result         1. No acute cardiopulmonary abnormalities  are identified.      CT-ABDOMEN-PELVIS WITH   Final Result      1.  Diffuse scrotal edema, eccentric to the RIGHT with extensive soft tissue gas present particularly in the RIGHT groin and hemiscrotum, consistent with Landon's gangrene.   2.  No discrete drainable abscess demonstrated.   3.  Minimal retroperitoneal adenopathy, nonspecific.   4.  Nonobstructing LEFT kidney stone.   5.  Splenomegaly.   6.  Cystic lesion in the pancreatic tail measuring 19 mm.  Neoplasm is not excluded.  Consider further evaluation with MRI.      These findings were discussed with AYALA HAMPTON on 7/25/2020 3:41 PM.      DX-CHEST-PORTABLE (1 VIEW)   Final Result      1.  There is no acute cardiopulmonary process.           Assessment/Plan  Stenosis of right carotid artery-Right CEA 3/21/18  Assessment & Plan  Continue aspirin, Eliquis  Statin allergy    * Landon's gangrene of scrotum  Assessment & Plan  Severe involving right testicle, status post multiple washouts by urology on 7/25, 7/29, 7/31, 8/3.  Pain control has been a challenge:   Continue abapentin 400 TID  Continue MS contin, with breakthrough oxycodone 5-10 q6h PRN  IV morphine PRN for dressing changes only  Per ID: Continue unasyn and fluconazole (2 wk course post last surgery (end 8/17/20))  Transition to Augmentin, oral Diflucan on DC if prior to the above date  Monitor LFTs daily  Limit immunosuppression as much as possible to facilitate healing  Tight glucose control, goal is less than 150  Aggressive wound care  PT/OT as able  Referral SNF vs LTAC pending    CAD (coronary artery disease)  Assessment & Plan  No acute events  Continue aspirin  Statin      Type 2 diabetes mellitus, with long-term current use of insulin (HCC)  Assessment & Plan  -Lantus 22 units- whs  -SSI      Paroxysmal A-fib (HCC)  Assessment & Plan  Continue Eliquis, as there are no further washouts planned at this point  -Rate controlled    Polycythemia  Assessment & Plan  I discussed the case  with Dr. Sims of hematology as patient has a history of MI when platelets were greater than 1 million in the past  Increase hydroxyurea to 1500 mg nightly  Goal is 500-700  Trend CBC with differential daily to monitor for leukopenia    Constipation  Assessment & Plan  Due to narcotics, advance bowel regimen    Debility  Assessment & Plan  -Patient deconditioned status   PT OT  -Extensive RIGHT scrotal would    Pancreatic cyst  Assessment & Plan  Follow up with imaging as outpatient      SLE (systemic lupus erythematosus related syndrome) (HCC)  Assessment & Plan  Prednisone on hold secondary to infection -he does not appear to have any evidence of adrenal insufficiency  Has not had steroid since 7/31/2020             VTE prophylaxis: Eliquis    Total time:  40 minutes.  I spent greater than 50% of the time for patient care, counseling, and coordination on this date, including unit/floor time, and face-to-face time with the patient as per interval events and assessment and plan above    We had a long discussion re: pain management and planning.

## 2020-08-15 NOTE — CARE PLAN
Problem: Pain Management  Goal: Pain level will decrease to patient's comfort goal  Outcome: PROGRESSING AS EXPECTED  Intervention: Follow pain managment plan developed in collaboration with patient and Interdisciplinary Team  Note: Pain controlled with analgesics     Problem: Communication  Goal: The ability to communicate needs accurately and effectively will improve  Outcome: PROGRESSING AS EXPECTED  Intervention: [place] patient and significant other/support system to call light to alert staff of needs  Note: Pt uses call light appropriately for needs.       Problem: Safety  Goal: Will remain free from falls  Outcome: PROGRESSING AS EXPECTED  Intervention: Implement fall precautions  Flowsheets (Taken 8/14/2020 1922)  Environmental Precautions:   Treaded Slipper Socks on Patient   Personal Belongings, Wastebasket, Call Bell etc. in Easy Reach   Transferred to Stronger Side   Report Given to Other Health Care Providers Regarding Fall Risk   Bed in Low Position   Mobility Assessed & Appropriate Sign Placed   Communication Sign for Patients & Families

## 2020-08-15 NOTE — PROGRESS NOTES
Received report from noc shift nurse. Assumed pt care at 0715. Pt is A&Ox4, resting comfortably in bed. Pt on 2L O2 via nasal cannula. No signs of SOB/respiratory distress. Labs noted, VSS. Pt c/o pain 4/10 at this moment. Pt appears calm with unlabored breathing, encouraged to rest. Wound vac cannister observed to be full with 500ml of drainage. Wound vac cannister ordered and changed per protocol. Assessment completed. Will return for morning meds. Fall precautions in place. Bed at lowest position. Call light and personal belongings within reach. Continue to monitor

## 2020-08-15 NOTE — WOUND TEAM
[facility]  Inpatient Services  Wound and Skin Care Progress note    Admission Date: 7/25/2020     Last order of IP CONSULT TO WOUND CARE was found on 7/30/2020 from Hospital Encounter on 7/25/2020     HPI, PMH, SH: Reviewed    Unit where seen by Wound Team: 2215/01     WOUND CONSULT/FOLLOW-UP RELATED TO: scheudled NPWT Change    Self Report / Pain Level:  Premedicated by primary with IV morphine, dilaudid, and required one time dose Morphine during dsg change. Lidocaine to site    OBJECTIVE:  On pressure redistribution mattress, drsg intact. Co-treated with Thu wound team.    WOUND TYPE, LOCATION, CHARACTERISTICS (Pressure Injuries: location, stage, POA or date identified)    Negative Pressure Wound Therapy 07/27/20 Surgical (Active)   NPWT Pump Mode / Pressure Setting Ulta;125 mmHg    Dressing Type Medium;Black Foam (Veraflo)    Number of Foam Pieces Used 1    Canister Changed No    NEXT Dressing Change/Treatment Date 08/14/20    VAC VeraFlo Irrigant Normal Saline    VAC VeraFlo Soak Time (mins) 4    VAC VeraFlo Instill Volume (ml) 16    VAC VeraFlo - Therapy Time (hrs) 2.5    VAC VeraFlo Pressure (mm/Hg) 125 mmHg       Wound 07/25/20 Full Thickness Wound Scrotum Right (Active)   Wound Image     08/10/20 1645   Site Assessment Red;Granulation tissue    Periwound Assessment Red;Pink    Margins Epibole (rolled edges)    Closure Secondary intention    Drainage Amount Scant    Drainage Description Serosanguineous    Treatments Cleansed;Site care    Wound Cleansing Normal Saline Irrigation    Periwound Protectant Skin Protectant Wipes to Periwound;Paste Ring    Dressing Cleansing/Solutions Normal Saline    Dressing Options Wound Vac    Dressing Changed Changed    Dressing Status Intact    Dressing Change/Treatment Frequency Monday, Wednesday, Friday, and As Needed    NEXT Dressing Change/Treatment Date 08/17/20    NEXT Weekly Photo (Inpatient Only) 08/17/20    Non-staged Wound Description Full  thickness    Wound Length (cm) 6 cm    Wound Width (cm) 10 cm    Wound Depth (cm) 4 cm    Wound Surface Area (cm^2) 60 cm^2    Wound Volume (cm^3) 240 cm^3    Wound Healing % -439    Wound Bed Granulation (%) 95 %    Tunneling (cm) 5.5 cm    Tunneling Clock Position of Wound 11    Tunneling - 2nd Location (cm) 10 cm    Tunneling Clock Position of Wound - 2nd Location 11    Undermining (cm) 4.3 cm    Undermining of Wound, 1st Location From 9 o'clock;To 10 o'clock    Undermining (cm) - 2nd location 3.4 cm    Undermining of Wound, 2nd Location From 2 o'clock;To 3 o'clock    Shape irregular    Wound Odor None    Pulses N/A    Exposed Structures Other (Comments)      Vascular:    JW:   No results found.      Lab Values:    Lab Results   Component Value Date/Time    WBC 6.6 08/14/2020 04:24 AM    RBC 5.02 08/14/2020 04:24 AM    HEMOGLOBIN 12.4 (L) 08/14/2020 04:24 AM    HEMATOCRIT 41.7 (L) 08/14/2020 04:24 AM    CREACTPROT 0.58 07/01/2020 07:03 AM    SEDRATEWES 15 07/01/2020 07:03 AM    HBA1C 8.6 (A) 01/07/2020 07:14 AM          Culture Results show:  Recent Results (from the past 720 hour(s))   CULTURE WOUND W/ GRAM STAIN    Collection Time: 07/31/20  6:45 PM    Specimen: Wound   Result Value Ref Range    Significant Indicator POS (POS)     Source WND     Site SCROTAL ABSCESS     Culture Result (A)      Growth noted after further incubation, see below for  organism identification.      Gram Stain Result       Many WBCs.  Rare Gram positive cocci.  Rare Yeast.      Culture Result Candida albicans  Rare growth   (A)          INTERVENTIONS BY WOUND TEAM:  Soaked drsg with NS, then instilled 1/2 of lidocaine soln Prepared drsg materials. Removed drsg with adhesive remover, cleaned wound with NS, dried. Applied lidocaine to wound bed. No sting and 2 paste ring to sharmaine-wound. Adaptic applied to L quadrant of R testicle. One piece of medium Veraflow foam to wound. Sealed and Vera deep resumed therapy. No change in vac  settings.    Interdisciplinary consultation: Patient,  Parvin JORGENSEN, Thu wound team    EVALUATION: Pain continues to improve, although reported higher pain today d/t increased mobilization the day before. Wound also continues to improve in depth and appearance. Continue with NPWT to increase growth of granular tissue.    Goals: Steady decrease in wound area and depth weekly.    NURSING PLAN OF CARE ORDERS (X):     Dressing changes: Continue previous Dressing Maintenance orders:   x     See new Dressing Maintenance orders:       Skin care: See Skin Care orders:        Rectal tube care: See Rectal Tube Care orders:      Other orders:           WOUND TEAM PLAN OF CARE:   Dressing changes by wound team:          Follow up 1-2 times weekly:               Follow up 3 times weekly:                NPWT change 3 times weekly:  x   Follow up as needed:       Other (explain):     Anticipated discharge plans:   LTACH:        SNF/Rehab:                  Home Care:           Outpatient Wound Center:            Self Care:           Other: has VAC and supplies in room-if pt going to [facility] may need to return to [facility]

## 2020-08-15 NOTE — PROGRESS NOTES
Pt resting in bed, A&Ox4, VSS, scrotal pain 6/10, declines intervention at this time, dressing clean, dry, and intact, wound vac to suction. POC discussed, denies further needs at this time. Safety measures and hourly rounding in place.

## 2020-08-15 NOTE — CARE PLAN
Problem: Communication  Goal: The ability to communicate needs accurately and effectively will improve  Outcome: PROGRESSING AS EXPECTED     Problem: Safety  Goal: Will remain free from injury  Outcome: PROGRESSING AS EXPECTED  Goal: Will remain free from falls  Outcome: PROGRESSING AS EXPECTED     Problem: Psychosocial Needs:  Goal: Level of anxiety will decrease  Outcome: PROGRESSING AS EXPECTED

## 2020-08-15 NOTE — THERAPY
Occupational Therapy  Daily Treatment     Patient Name: Francesco Schulte  Age:  70 y.o., Sex:  male  Medical Record #: [id number]  Today's Date: 8/15/2020     Precautions  Precautions: Fall Risk  Comments: Wound vac    Assessment    Pt is making excellent progress with ADLS and functional mobility, will continue to benefit from Acute OT services.    Plan    Continue current treatment plan.    DC Equipment Recommendations: Unable to determine at this time  Discharge Recommendations: Recommend post-acute placement for additional occupational therapy services prior to discharge home       08/15/20 1522   Precautions   Precautions Fall Risk   Comments Wound vac   Pain 0 - 10 Group   Therapist Pain Assessment Prior to Activity;During Activity;Post Activity;Nurse Notified;8   Cognition    Cognition / Consciousness WDL   Level of Consciousness Alert   Comments Pleasant and cooperative   Active ROM Upper Body   Active ROM Upper Body  WDL   Dominant Hand Right   Supine Upper Body Exercises   Supine Upper Body Exercises Yes   Comments Pink theraband   Balance   Sitting Balance (Static) Good   Sitting Balance (Dynamic) Fair +   Standing Balance (Static) Fair +   Standing Balance (Dynamic) Fair   Weight Shift Sitting Good   Weight Shift Standing Fair   Skilled Intervention Verbal Cuing;Postural Facilitation;Tactile Cuing   Bed Mobility    Supine to Sit Supervised   Sit to Supine Supervised   Scooting Supervised   Skilled Intervention Verbal Cuing;Tactile Cuing;Postural Facilitation   Activities of Daily Living   Eating Independent   Grooming Supervision;Standing   Upper Body Dressing Minimal Assist   Skilled Intervention Verbal Cuing;Tactile Cuing;Sequencing   Functional Mobility   Sit to Stand Supervised   Bed, Chair, Wheelchair Transfer Minimal Assist   Toilet Transfers Minimal Assist   Transfer Method Stand Pivot;Stand Step   Mobility FWW   Skilled Intervention Verbal Cuing;Tactile Cuing;Sequencing   Patient / Family  Goals   Patient / Family Goal #1 Home   Short Term Goals   Short Term Goal # 1 Pt will get up to EOB supervised in prep for transfers in 4 visits   Goal Outcome # 1 Goal met   Short Term Goal # 2 [place] [facility] [place] with FWW in 4 visits   Goal Outcome # 2 Progressing as expected   Short Term Goal # 3 Pt will stand 10 min at sink to groom with Amina in 5 visits   Goal Outcome # 3 Progressing as expected   Short Term Goal # 4 Pt will complete LB dressing (socks at least) with Amina and AE in 5 visits   Goal Outcome # 4 Progressing slower than expected

## 2020-08-16 ENCOUNTER — APPOINTMENT (OUTPATIENT)
Dept: CARDIOLOGY | Facility: MEDICAL CENTER | Age: 70
DRG: 854 | End: 2020-08-16
Attending: INTERNAL MEDICINE
Payer: MEDICARE

## 2020-08-16 PROBLEM — Z71.89 ADVANCED CARE PLANNING/COUNSELING DISCUSSION: Status: ACTIVE | Noted: 2020-08-16

## 2020-08-16 LAB
ALBUMIN SERPL BCP-MCNC: 2.5 G/DL (ref 3.2–4.9)
BASOPHILS # BLD AUTO: 1.9 % (ref 0–1.8)
BASOPHILS # BLD: 0.14 K/UL (ref 0–0.12)
BUN SERPL-MCNC: 8 MG/DL (ref 8–22)
CALCIUM SERPL-MCNC: 8.8 MG/DL (ref 8.4–10.2)
CHLORIDE SERPL-SCNC: 106 MMOL/L (ref 96–112)
CO2 SERPL-SCNC: 26 MMOL/L (ref 20–33)
CREAT SERPL-MCNC: 0.86 MG/DL (ref 0.5–1.4)
EOSINOPHIL # BLD AUTO: 0.31 K/UL (ref 0–0.51)
EOSINOPHIL NFR BLD: 4.2 % (ref 0–6.9)
ERYTHROCYTE [DISTWIDTH] IN BLOOD BY AUTOMATED COUNT: 54.4 FL (ref 35.9–50)
GLUCOSE BLD-MCNC: 126 MG/DL (ref 65–99)
GLUCOSE BLD-MCNC: 127 MG/DL (ref 65–99)
GLUCOSE BLD-MCNC: 179 MG/DL (ref 65–99)
GLUCOSE SERPL-MCNC: 168 MG/DL (ref 65–99)
HCT VFR BLD AUTO: 40 % (ref 42–52)
HGB BLD-MCNC: 11.9 G/DL (ref 14–18)
IMM GRANULOCYTES # BLD AUTO: 0.15 K/UL (ref 0–0.11)
IMM GRANULOCYTES NFR BLD AUTO: 2 % (ref 0–0.9)
LYMPHOCYTES # BLD AUTO: 1.02 K/UL (ref 1–4.8)
LYMPHOCYTES NFR BLD: 13.8 % (ref 22–41)
MAGNESIUM SERPL-MCNC: 1.9 MG/DL (ref 1.5–2.5)
MCH RBC QN AUTO: 24.8 PG (ref 27–33)
MCHC RBC AUTO-ENTMCNC: 29.8 G/DL (ref 33.7–35.3)
MCV RBC AUTO: 83.3 FL (ref 81.4–97.8)
MONOCYTES # BLD AUTO: 0.54 K/UL (ref 0–0.85)
MONOCYTES NFR BLD AUTO: 7.3 % (ref 0–13.4)
NEUTROPHILS # BLD AUTO: 5.25 K/UL (ref 1.82–7.42)
NEUTROPHILS NFR BLD: 70.8 % (ref 44–72)
NRBC # BLD AUTO: 0.02 K/UL
NRBC BLD-RTO: 0.3 /100 WBC
PHOSPHATE SERPL-MCNC: 3.5 MG/DL (ref 2.5–4.5)
PLATELET # BLD AUTO: 920 K/UL (ref 164–446)
PMV BLD AUTO: 11 FL (ref 9–12.9)
POTASSIUM SERPL-SCNC: 3.9 MMOL/L (ref 3.6–5.5)
RBC # BLD AUTO: 4.8 M/UL (ref 4.7–6.1)
SODIUM SERPL-SCNC: 140 MMOL/L (ref 135–145)
WBC # BLD AUTO: 7.4 K/UL (ref 4.8–10.8)

## 2020-08-16 PROCEDURE — 700102 HCHG RX REV CODE 250 W/ 637 OVERRIDE(OP): Performed by: INTERNAL MEDICINE

## 2020-08-16 PROCEDURE — 36415 COLL VENOUS BLD VENIPUNCTURE: CPT

## 2020-08-16 PROCEDURE — 700111 HCHG RX REV CODE 636 W/ 250 OVERRIDE (IP): Performed by: INTERNAL MEDICINE

## 2020-08-16 PROCEDURE — 80069 RENAL FUNCTION PANEL: CPT

## 2020-08-16 PROCEDURE — A9270 NON-COVERED ITEM OR SERVICE: HCPCS | Performed by: HOSPITALIST

## 2020-08-16 PROCEDURE — 99232 SBSQ HOSP IP/OBS MODERATE 35: CPT | Performed by: INTERNAL MEDICINE

## 2020-08-16 PROCEDURE — 700102 HCHG RX REV CODE 250 W/ 637 OVERRIDE(OP): Performed by: HOSPITALIST

## 2020-08-16 PROCEDURE — 700111 HCHG RX REV CODE 636 W/ 250 OVERRIDE (IP): Performed by: NURSE PRACTITIONER

## 2020-08-16 PROCEDURE — A9270 NON-COVERED ITEM OR SERVICE: HCPCS | Performed by: INTERNAL MEDICINE

## 2020-08-16 PROCEDURE — 770006 HCHG ROOM/CARE - MED/SURG/GYN SEMI*

## 2020-08-16 PROCEDURE — 85025 COMPLETE CBC W/AUTO DIFF WBC: CPT

## 2020-08-16 PROCEDURE — 700105 HCHG RX REV CODE 258: Performed by: INTERNAL MEDICINE

## 2020-08-16 PROCEDURE — 82962 GLUCOSE BLOOD TEST: CPT | Mod: 91

## 2020-08-16 PROCEDURE — 83735 ASSAY OF MAGNESIUM: CPT

## 2020-08-16 PROCEDURE — 700101 HCHG RX REV CODE 250: Performed by: INTERNAL MEDICINE

## 2020-08-16 RX ORDER — OXYCODONE HYDROCHLORIDE 5 MG/1
5 TABLET ORAL EVERY 4 HOURS PRN
Status: DISCONTINUED | OUTPATIENT
Start: 2020-08-16 | End: 2020-08-22 | Stop reason: HOSPADM

## 2020-08-16 RX ORDER — OXYCODONE HYDROCHLORIDE 10 MG/1
10 TABLET ORAL EVERY 4 HOURS PRN
Status: DISCONTINUED | OUTPATIENT
Start: 2020-08-16 | End: 2020-08-22 | Stop reason: HOSPADM

## 2020-08-16 RX ADMIN — OXYCODONE HYDROCHLORIDE 10 MG: 10 TABLET ORAL at 23:53

## 2020-08-16 RX ADMIN — OXYCODONE HYDROCHLORIDE 10 MG: 10 TABLET ORAL at 15:31

## 2020-08-16 RX ADMIN — AZATHIOPRINE 50 MG: 50 TABLET ORAL at 09:19

## 2020-08-16 RX ADMIN — AMPICILLIN AND SULBACTAM 3 G: 2; 1 INJECTION, POWDER, FOR SOLUTION INTRAVENOUS at 17:10

## 2020-08-16 RX ADMIN — HYDROXYUREA 1500 MG: 500 CAPSULE ORAL at 20:21

## 2020-08-16 RX ADMIN — APIXABAN 5 MG: 5 TABLET, FILM COATED ORAL at 17:16

## 2020-08-16 RX ADMIN — AZATHIOPRINE 50 MG: 50 TABLET ORAL at 17:44

## 2020-08-16 RX ADMIN — OMEPRAZOLE 20 MG: 20 CAPSULE, DELAYED RELEASE ORAL at 09:28

## 2020-08-16 RX ADMIN — ASPIRIN 81 MG: 81 TABLET, COATED ORAL at 09:27

## 2020-08-16 RX ADMIN — APIXABAN 5 MG: 5 TABLET, FILM COATED ORAL at 09:28

## 2020-08-16 RX ADMIN — AMPICILLIN AND SULBACTAM 3 G: 2; 1 INJECTION, POWDER, FOR SOLUTION INTRAVENOUS at 13:04

## 2020-08-16 RX ADMIN — SENNOSIDES-DOCUSATE SODIUM TAB 8.6-50 MG 2 TABLET: 8.6-5 TAB at 09:27

## 2020-08-16 RX ADMIN — THERA TABS 1 TABLET: TAB at 09:28

## 2020-08-16 RX ADMIN — OXYCODONE HYDROCHLORIDE 10 MG: 10 TABLET ORAL at 19:31

## 2020-08-16 RX ADMIN — MORPHINE SULFATE 15 MG: 15 TABLET, EXTENDED RELEASE ORAL at 17:16

## 2020-08-16 RX ADMIN — SENNOSIDES-DOCUSATE SODIUM TAB 8.6-50 MG 2 TABLET: 8.6-5 TAB at 17:16

## 2020-08-16 RX ADMIN — AMPICILLIN SODIUM AND SULBACTAM SODIUM 3 G: 2; 1 INJECTION, POWDER, FOR SOLUTION INTRAMUSCULAR; INTRAVENOUS at 00:02

## 2020-08-16 RX ADMIN — INSULIN LISPRO 3 UNITS: 100 INJECTION, SOLUTION INTRAVENOUS; SUBCUTANEOUS at 11:03

## 2020-08-16 RX ADMIN — AMPICILLIN AND SULBACTAM 3 G: 2; 1 INJECTION, POWDER, FOR SOLUTION INTRAVENOUS at 23:53

## 2020-08-16 RX ADMIN — GABAPENTIN 400 MG: 400 CAPSULE ORAL at 20:21

## 2020-08-16 RX ADMIN — AMPICILLIN SODIUM AND SULBACTAM SODIUM 3 G: 2; 1 INJECTION, POWDER, FOR SOLUTION INTRAMUSCULAR; INTRAVENOUS at 06:11

## 2020-08-16 RX ADMIN — MORPHINE SULFATE 15 MG: 15 TABLET, EXTENDED RELEASE ORAL at 09:28

## 2020-08-16 RX ADMIN — OXYCODONE HYDROCHLORIDE 10 MG: 10 TABLET ORAL at 08:32

## 2020-08-16 RX ADMIN — GABAPENTIN 400 MG: 400 CAPSULE ORAL at 09:28

## 2020-08-16 RX ADMIN — GABAPENTIN 400 MG: 400 CAPSULE ORAL at 15:31

## 2020-08-16 RX ADMIN — FLUCONAZOLE 200 MG: 200 TABLET ORAL at 09:18

## 2020-08-16 ASSESSMENT — ENCOUNTER SYMPTOMS
DEPRESSION: 0
BLURRED VISION: 0
BLOOD IN STOOL: 0
WEAKNESS: 1
COUGH: 0
HALLUCINATIONS: 0
DOUBLE VISION: 1
FOCAL WEAKNESS: 0
DIARRHEA: 0
TREMORS: 0
FALLS: 0
ABDOMINAL PAIN: 0
FEVER: 0
LOSS OF CONSCIOUSNESS: 0
SORE THROAT: 0
SEIZURES: 0
NAUSEA: 0
NERVOUS/ANXIOUS: 0
PND: 0
WHEEZING: 0
DIZZINESS: 0
MYALGIAS: 1
TINGLING: 0
SENSORY CHANGE: 0
BACK PAIN: 0
PHOTOPHOBIA: 0
CHILLS: 0
SPEECH CHANGE: 0
HEADACHES: 0
SHORTNESS OF BREATH: 0

## 2020-08-16 NOTE — PROGRESS NOTES
Assumed care of patient at 0700. Patient reports 5/10 pain in groin, medicated per MAR. Patient sitting up in bed eating breakfast and denies further needs at this time. Call light in reach. Bed in lowest locked position. Hourly rounding to continue.

## 2020-08-16 NOTE — CARE PLAN
Problem: Safety  Goal: Will remain free from injury  Outcome: PROGRESSING AS EXPECTED  Note: Patient oriented to unit. Educated and understands the use of call light. Bed in lowest, locked position. Personal belongings in reach.      Problem: Bowel/Gastric:  Goal: Normal bowel function is maintained or improved  Outcome: PROGRESSING AS EXPECTED  Note: Patient educated on diet, fluid intake and medications to promote bowel function.

## 2020-08-16 NOTE — PROGRESS NOTES
Pt resting in bed, A&Ox4, VSS, pain controlled at 5/10 in groin, declines intervention. POC discussed, denies further needs at this time. Safety measures and hourly rounding in place.

## 2020-08-16 NOTE — ASSESSMENT & PLAN NOTE
-without acute exacerbation  -ECHO this hospitalization unchanged from previous - EF 30%  -OP FU with Cardiology

## 2020-08-16 NOTE — PROGRESS NOTES
Hospital Medicine Daily Progress Note    Date of Service  8/16/2020    Chief Complaint  69 y.o. male admitted 7/25/2020 with scrotal swelling and pain    Hospital Course    History of coronary artery disease status post stent in the past, type 2 diabetes, paroxysmal atrial fibrillation on Eliquis, polycythemia on hydroxyurea, SLE on Imuran, admitted for scrotal pain and swelling found to have a right scrotal abscess and Landon's gangrene.  Urology was emergently consulted and completed a washout on 725.  Since then, he has undergone 3 additional washouts on 7/29, 7/31, 8/3.  He has a wound VAC in place and is having significant discomfort with dressing changes.  Overall, infectious markers have improved.  Cultures grew Candida albicans therefore Diflucan has been added to his antibiotic regimen.      Interval Problem Update  8/11: Tolerating antibiotics, gabapentin is helping with shooting nerve pain.  Still requiring IV Ativan and Dilaudid prior to dressing changes.  Disposition pending, needs to work with PT OT.  8/12: Tolerating antibiotics, medicated for dressing change today.  Gabapentin increased.  Glucose is well controlled in the 150s to 160s.  8/13: Feels drowsy today, pain control has improved significantly.  Glucose is at goal.  MS Contin discontinued, hydroxyurea dose increased to 1000 daily.  I discussed the case with Dr. Sims for dose recommendations  8/14: Poor pain control overnight after MS Contin was discontinued, resumed.  Platelets improved with increased hydroxyurea dose.  Still with significant pain with dressing changes.  Tolerated abx  8/15: Platelets increased to 900s, hydroxyurea increased, repeat in AM.  C/O drowsiness and double vision.  Reduced narcotics, serial neuro exams  8/16: Platelets are rising, tolerating increased hydroxyurea 1500 mg.  Discussed pain control and CODE STATUS, palliative care consulted.      Consultants/Specialty  Dr. kidd, urology  Dr. Sims - by  phone    Code Status  Full Code    Disposition  SNF versus LTAC    Review of Systems  Review of Systems   Constitutional: Negative for chills and fever.        Comfortable   HENT: Negative for hearing loss, sore throat and tinnitus.    Eyes: Positive for double vision. Negative for blurred vision and photophobia.   Respiratory: Negative for cough, shortness of breath and wheezing.    Cardiovascular: Negative for chest pain, leg swelling and PND.   Gastrointestinal: Negative for abdominal pain, blood in stool, diarrhea and nausea.   Genitourinary: Negative for dysuria, frequency, hematuria and urgency.        Shooting pain in R testicle, radiating into abdomen.  Gabapentin helps with this.   Musculoskeletal: Positive for myalgias (Still with severe groin discomfort after ambulation). Negative for back pain, falls and joint pain.   Neurological: Positive for weakness. Negative for dizziness, tingling, tremors, sensory change, speech change, focal weakness, seizures, loss of consciousness and headaches.   Psychiatric/Behavioral: Negative for depression and hallucinations. The patient is not nervous/anxious.         Tearful at times today. Missing his family   All other systems reviewed and are negative.       Physical Exam  Temp:  [36.4 °C (97.6 °F)-37 °C (98.6 °F)] 36.5 °C (97.7 °F)  Pulse:  [66-74] 74  Resp:  [16-18] 18  BP: (122-136)/(68-75) 132/68  SpO2:  [93 %-100 %] 97 %    Physical Exam  Constitutional:       Appearance: Normal appearance. He is not ill-appearing or diaphoretic.      Comments: LIstless, weak   HENT:      Head: Normocephalic and atraumatic.      Nose: Nose normal.      Mouth/Throat:      Mouth: Mucous membranes are dry.   Eyes:      General: No scleral icterus.        Right eye: No discharge.         Left eye: No discharge (.recent).      Extraocular Movements: Extraocular movements intact.      Conjunctiva/sclera: Conjunctivae normal.      Pupils: Pupils are equal, round, and reactive to light.    Neck:      Musculoskeletal: Normal range of motion and neck supple.   Cardiovascular:      Rate and Rhythm: Normal rate and regular rhythm.      Heart sounds: No murmur. No gallop.    Pulmonary:      Effort: Pulmonary effort is normal. No respiratory distress.      Breath sounds: Normal breath sounds. No stridor.   Abdominal:      General: Abdomen is flat. Bowel sounds are normal. There is no distension.      Palpations: Abdomen is soft.      Tenderness: There is no abdominal tenderness.   Genitourinary:     Comments: Edema and induration in the groin and perineum  Musculoskeletal:         General: No swelling, tenderness or deformity.   Skin:     General: Skin is warm and dry.      Coloration: Skin is pale.   Neurological:      General: No focal deficit present.      Mental Status: He is oriented to person, place, and time. Mental status is at baseline.      Cranial Nerves: No cranial nerve deficit.      Coordination: Coordination normal.      Comments: Eyes track symmetrically, pupils equal   Psychiatric:         Mood and Affect: Mood normal.         Behavior: Behavior is slowed.         Thought Content: Thought content normal.      Comments: Tangential         Fluids    Intake/Output Summary (Last 24 hours) at 8/16/2020 1042  Last data filed at 8/16/2020 0600  Gross per 24 hour   Intake 1260 ml   Output 5625 ml   Net -4365 ml       Laboratory  Recent Labs     08/14/20  0424 08/15/20  0408 08/16/20 0417   WBC 6.6 8.6 7.4   RBC 5.02 4.84 4.80   HEMOGLOBIN 12.4* 12.0* 11.9*   HEMATOCRIT 41.7* 40.5* 40.0*   MCV 83.1 83.7 83.3   MCH 24.7* 24.8* 24.8*   MCHC 29.7* 29.6* 29.8*   RDW 55.0* 55.0* 54.4*   PLATELETCT 799* 914* 920*   MPV 10.7 11.0 11.0     Recent Labs     08/14/20  0424 08/15/20  0408 08/16/20  0417   SODIUM 140 141 140   POTASSIUM 3.7 3.7 3.9   CHLORIDE 105 107 106   CO2 27 27 26   GLUCOSE 137* 145* 168*   BUN 9 9 8   CREATININE 0.86 0.89 0.86   CALCIUM 8.7 8.8 8.8                   Imaging  IR-MIDLINE  CATHETER INSERTION WO GUIDANCE > AGE 3   Final Result                  Ultrasound-guided midline placement performed by qualified nursing staff    as above.          DX-CHEST-PORTABLE (1 VIEW)   Final Result         1. No acute cardiopulmonary abnormalities are identified.      CT-ABDOMEN-PELVIS WITH   Final Result      1.  Diffuse scrotal edema, eccentric to the RIGHT with extensive soft tissue gas present particularly in the RIGHT groin and hemiscrotum, consistent with Landon's gangrene.   2.  No discrete drainable abscess demonstrated.   3.  Minimal retroperitoneal adenopathy, nonspecific.   4.  Nonobstructing LEFT kidney stone.   5.  Splenomegaly.   6.  Cystic lesion in the pancreatic tail measuring 19 mm.  Neoplasm is not excluded.  Consider further evaluation with MRI.      These findings were discussed with YAALA HAMPTON on 7/25/2020 3:41 PM.      DX-CHEST-PORTABLE (1 VIEW)   Final Result      1.  There is no acute cardiopulmonary process.      EC-ECHOCARDIOGRAM COMPLETE W/O CONT    (Results Pending)        Assessment/Plan  Stenosis of right carotid artery-Right CEA 3/21/18  Assessment & Plan  Continue aspirin, Eliquis  Statin allergy    * Landon's gangrene of scrotum  Assessment & Plan  Severe involving right testicle, status post multiple washouts by urology on 7/25, 7/29, 7/31, 8/3.  Pain control has been a challenge:   Continue abapentin 400 TID  Continue MS contin, with breakthrough oxycodone 5-10 q4h PRN  IV morphine PRN for dressing changes only  Per ID: Continue unasyn and fluconazole (2 wk course post last surgery (end 8/17/20))  Monitor LFTs daily  Limit immunosuppression as much as possible to facilitate healing  Tight glucose control, goal is less than 150  Aggressive wound care  PT/OT as able  Acceptance to SNF vs LTAC pending    CAD (coronary artery disease)  Assessment & Plan  No acute events  Continue aspirin, eliquis  Statin allergy      Type 2 diabetes mellitus, with long-term current  "use of insulin ([facility])  Assessment & Plan  -Lantus 22 units- whs  -SSI      Chronic systolic CHF (congestive heart failure) ([facility])  Assessment & Plan  Last EF about 1 year ago was 30%, he has no evidence of overload however is concerned about his elevated platelets as he has had an MI in the past when they nessa greater than 1M.  Check echo    Paroxysmal A-fib ([facility])  Assessment & Plan  Continue Eliquis, as there are no further washouts planned at this point  -Rate controlled    Polycythemia  Assessment & Plan  Plts have been trending up despite increased dose of hydroxyurea.  I discussed the case with Dr. Sims of hematology as patient has a history of MI when platelets were greater than 1 million in the past  Increased hydroxyurea back to 1500 mg nightly  Goal is 500-700  Trend CBC with differential daily to monitor for leukopenia  If platelets are higher tomorrow, will rediscuss with hematology    Advanced care planning/counseling discussion  Assessment & Plan  Patient is saying that he has wanted to be \"DNR\" in the past however also states that he would want resuscitation just would not want to be a \"vegetable\".    Consult placed to palliative care for family meeting with patient and his daughter, hopefully Monday 8/17  For now patient states he would want resuscitation    Constipation  Assessment & Plan  Due to narcotics, advance bowel regimen    Debility  Assessment & Plan  -Patient deconditioned status   PT OT  -Extensive RIGHT scrotal would    Pancreatic cyst  Assessment & Plan  Follow up with imaging as outpatient      SLE (systemic lupus erythematosus related syndrome) ([facility])  Assessment & Plan  Prednisone on hold secondary to infection -he does not appear to have any evidence of adrenal insufficiency  Has not had steroid since 7/31/2020             VTE prophylaxis: Eliquis    "

## 2020-08-17 ENCOUNTER — APPOINTMENT (OUTPATIENT)
Dept: CARDIOLOGY | Facility: MEDICAL CENTER | Age: 70
DRG: 854 | End: 2020-08-17
Attending: INTERNAL MEDICINE
Payer: MEDICARE

## 2020-08-17 LAB
ALBUMIN SERPL BCP-MCNC: 2.5 G/DL (ref 3.2–4.9)
BASOPHILS # BLD AUTO: 1.2 % (ref 0–1.8)
BASOPHILS # BLD: 0.09 K/UL (ref 0–0.12)
BUN SERPL-MCNC: 11 MG/DL (ref 8–22)
CALCIUM SERPL-MCNC: 8.7 MG/DL (ref 8.4–10.2)
CHLORIDE SERPL-SCNC: 104 MMOL/L (ref 96–112)
CO2 SERPL-SCNC: 24 MMOL/L (ref 20–33)
CREAT SERPL-MCNC: 0.91 MG/DL (ref 0.5–1.4)
EOSINOPHIL # BLD AUTO: 0.32 K/UL (ref 0–0.51)
EOSINOPHIL NFR BLD: 4.2 % (ref 0–6.9)
ERYTHROCYTE [DISTWIDTH] IN BLOOD BY AUTOMATED COUNT: 54.6 FL (ref 35.9–50)
GLUCOSE BLD-MCNC: 150 MG/DL (ref 65–99)
GLUCOSE BLD-MCNC: 158 MG/DL (ref 65–99)
GLUCOSE BLD-MCNC: 161 MG/DL (ref 65–99)
GLUCOSE BLD-MCNC: 167 MG/DL (ref 65–99)
GLUCOSE SERPL-MCNC: 167 MG/DL (ref 65–99)
HCT VFR BLD AUTO: 38.4 % (ref 42–52)
HGB BLD-MCNC: 11.5 G/DL (ref 14–18)
IMM GRANULOCYTES # BLD AUTO: 0.1 K/UL (ref 0–0.11)
IMM GRANULOCYTES NFR BLD AUTO: 1.3 % (ref 0–0.9)
LV EJECT FRACT  99904: 30
LV EJECT FRACT  99904: 30
LV EJECT FRACT MOD 4C 99902: 37.89
LV EJECT FRACT MOD 4C 99902: 37.89
LYMPHOCYTES # BLD AUTO: 0.89 K/UL (ref 1–4.8)
LYMPHOCYTES NFR BLD: 11.6 % (ref 22–41)
MAGNESIUM SERPL-MCNC: 1.8 MG/DL (ref 1.5–2.5)
MCH RBC QN AUTO: 24.7 PG (ref 27–33)
MCHC RBC AUTO-ENTMCNC: 29.9 G/DL (ref 33.7–35.3)
MCV RBC AUTO: 82.6 FL (ref 81.4–97.8)
MONOCYTES # BLD AUTO: 0.46 K/UL (ref 0–0.85)
MONOCYTES NFR BLD AUTO: 6 % (ref 0–13.4)
NEUTROPHILS # BLD AUTO: 5.8 K/UL (ref 1.82–7.42)
NEUTROPHILS NFR BLD: 75.7 % (ref 44–72)
NRBC # BLD AUTO: 0 K/UL
NRBC BLD-RTO: 0 /100 WBC
PHOSPHATE SERPL-MCNC: 3.6 MG/DL (ref 2.5–4.5)
PLATELET # BLD AUTO: 942 K/UL (ref 164–446)
PMV BLD AUTO: 10.9 FL (ref 9–12.9)
POTASSIUM SERPL-SCNC: 3.5 MMOL/L (ref 3.6–5.5)
RBC # BLD AUTO: 4.65 M/UL (ref 4.7–6.1)
SODIUM SERPL-SCNC: 138 MMOL/L (ref 135–145)
WBC # BLD AUTO: 7.7 K/UL (ref 4.8–10.8)

## 2020-08-17 PROCEDURE — 700102 HCHG RX REV CODE 250 W/ 637 OVERRIDE(OP): Performed by: HOSPITALIST

## 2020-08-17 PROCEDURE — 36415 COLL VENOUS BLD VENIPUNCTURE: CPT

## 2020-08-17 PROCEDURE — 770006 HCHG ROOM/CARE - MED/SURG/GYN SEMI*

## 2020-08-17 PROCEDURE — 85025 COMPLETE CBC W/AUTO DIFF WBC: CPT

## 2020-08-17 PROCEDURE — 700102 HCHG RX REV CODE 250 W/ 637 OVERRIDE(OP): Performed by: INTERNAL MEDICINE

## 2020-08-17 PROCEDURE — 700117 HCHG RX CONTRAST REV CODE 255: Performed by: INTERNAL MEDICINE

## 2020-08-17 PROCEDURE — 700105 HCHG RX REV CODE 258: Performed by: INTERNAL MEDICINE

## 2020-08-17 PROCEDURE — 700111 HCHG RX REV CODE 636 W/ 250 OVERRIDE (IP): Performed by: NURSE PRACTITIONER

## 2020-08-17 PROCEDURE — 99233 SBSQ HOSP IP/OBS HIGH 50: CPT | Performed by: INTERNAL MEDICINE

## 2020-08-17 PROCEDURE — 93306 TTE W/DOPPLER COMPLETE: CPT | Mod: 26 | Performed by: INTERNAL MEDICINE

## 2020-08-17 PROCEDURE — 83735 ASSAY OF MAGNESIUM: CPT

## 2020-08-17 PROCEDURE — 700101 HCHG RX REV CODE 250: Performed by: INTERNAL MEDICINE

## 2020-08-17 PROCEDURE — 82962 GLUCOSE BLOOD TEST: CPT

## 2020-08-17 PROCEDURE — A9270 NON-COVERED ITEM OR SERVICE: HCPCS | Performed by: HOSPITALIST

## 2020-08-17 PROCEDURE — A9270 NON-COVERED ITEM OR SERVICE: HCPCS | Performed by: INTERNAL MEDICINE

## 2020-08-17 PROCEDURE — 97606 NEG PRS WND THER DME>50 SQCM: CPT

## 2020-08-17 PROCEDURE — 93306 TTE W/DOPPLER COMPLETE: CPT

## 2020-08-17 PROCEDURE — 700111 HCHG RX REV CODE 636 W/ 250 OVERRIDE (IP): Performed by: INTERNAL MEDICINE

## 2020-08-17 PROCEDURE — 80069 RENAL FUNCTION PANEL: CPT

## 2020-08-17 RX ORDER — HYDROXYUREA 500 MG/1
2000 CAPSULE ORAL NIGHTLY
Status: DISCONTINUED | OUTPATIENT
Start: 2020-08-17 | End: 2020-08-18

## 2020-08-17 RX ORDER — SODIUM CHLORIDE 9 MG/ML
INJECTION, SOLUTION INTRAVENOUS ONCE
Status: COMPLETED | OUTPATIENT
Start: 2020-08-17 | End: 2020-08-18

## 2020-08-17 RX ORDER — MAGNESIUM SULFATE HEPTAHYDRATE 40 MG/ML
2 INJECTION, SOLUTION INTRAVENOUS ONCE
Status: COMPLETED | OUTPATIENT
Start: 2020-08-17 | End: 2020-08-17

## 2020-08-17 RX ORDER — POTASSIUM CHLORIDE 20 MEQ/1
40 TABLET, EXTENDED RELEASE ORAL ONCE
Status: COMPLETED | OUTPATIENT
Start: 2020-08-17 | End: 2020-08-17

## 2020-08-17 RX ORDER — MORPHINE SULFATE 4 MG/ML
3 INJECTION, SOLUTION INTRAMUSCULAR; INTRAVENOUS ONCE
Status: DISPENSED | OUTPATIENT
Start: 2020-08-17 | End: 2020-08-18

## 2020-08-17 RX ORDER — LORAZEPAM 2 MG/ML
0.5 INJECTION INTRAMUSCULAR ONCE
Status: COMPLETED | OUTPATIENT
Start: 2020-08-17 | End: 2020-08-17

## 2020-08-17 RX ADMIN — SENNOSIDES-DOCUSATE SODIUM TAB 8.6-50 MG 2 TABLET: 8.6-5 TAB at 17:10

## 2020-08-17 RX ADMIN — GABAPENTIN 400 MG: 400 CAPSULE ORAL at 17:10

## 2020-08-17 RX ADMIN — OXYCODONE HYDROCHLORIDE 10 MG: 10 TABLET ORAL at 12:24

## 2020-08-17 RX ADMIN — MORPHINE SULFATE 15 MG: 15 TABLET, EXTENDED RELEASE ORAL at 08:15

## 2020-08-17 RX ADMIN — POTASSIUM CHLORIDE 40 MEQ: 1500 TABLET, EXTENDED RELEASE ORAL at 09:42

## 2020-08-17 RX ADMIN — SODIUM CHLORIDE: 9 INJECTION, SOLUTION INTRAVENOUS at 15:40

## 2020-08-17 RX ADMIN — MORPHINE SULFATE 4 MG: 4 INJECTION INTRAVENOUS at 13:17

## 2020-08-17 RX ADMIN — MORPHINE SULFATE 2 MG: 4 INJECTION INTRAVENOUS at 21:02

## 2020-08-17 RX ADMIN — LORAZEPAM 0.5 MG: 2 INJECTION INTRAMUSCULAR; INTRAVENOUS at 13:51

## 2020-08-17 RX ADMIN — GABAPENTIN 400 MG: 400 CAPSULE ORAL at 08:16

## 2020-08-17 RX ADMIN — OXYCODONE HYDROCHLORIDE 10 MG: 10 TABLET ORAL at 23:08

## 2020-08-17 RX ADMIN — HYDROXYUREA 2000 MG: 500 CAPSULE ORAL at 21:02

## 2020-08-17 RX ADMIN — INSULIN LISPRO 3 UNITS: 100 INJECTION, SOLUTION INTRAVENOUS; SUBCUTANEOUS at 05:32

## 2020-08-17 RX ADMIN — APIXABAN 5 MG: 5 TABLET, FILM COATED ORAL at 17:10

## 2020-08-17 RX ADMIN — MAGNESIUM SULFATE 2 G: 2 INJECTION INTRAVENOUS at 09:42

## 2020-08-17 RX ADMIN — AZATHIOPRINE 50 MG: 50 TABLET ORAL at 17:24

## 2020-08-17 RX ADMIN — LIDOCAINE HYDROCHLORIDE 20 ML: 20 INJECTION, SOLUTION INFILTRATION; PERINEURAL at 13:22

## 2020-08-17 RX ADMIN — MORPHINE SULFATE 15 MG: 15 TABLET, EXTENDED RELEASE ORAL at 17:10

## 2020-08-17 RX ADMIN — INSULIN LISPRO 2 UNITS: 100 INJECTION, SOLUTION INTRAVENOUS; SUBCUTANEOUS at 21:14

## 2020-08-17 RX ADMIN — FLUCONAZOLE 200 MG: 200 TABLET ORAL at 08:15

## 2020-08-17 RX ADMIN — OMEPRAZOLE 20 MG: 20 CAPSULE, DELAYED RELEASE ORAL at 08:16

## 2020-08-17 RX ADMIN — ASPIRIN 81 MG: 81 TABLET, COATED ORAL at 08:15

## 2020-08-17 RX ADMIN — THERA TABS 1 TABLET: TAB at 08:15

## 2020-08-17 RX ADMIN — APIXABAN 5 MG: 5 TABLET, FILM COATED ORAL at 08:14

## 2020-08-17 RX ADMIN — HUMAN ALBUMIN MICROSPHERES AND PERFLUTREN 3 ML: 10; .22 INJECTION, SOLUTION INTRAVENOUS at 07:59

## 2020-08-17 RX ADMIN — AMPICILLIN AND SULBACTAM 3 G: 2; 1 INJECTION, POWDER, FOR SOLUTION INTRAVENOUS at 05:27

## 2020-08-17 RX ADMIN — AZATHIOPRINE 50 MG: 50 TABLET ORAL at 08:15

## 2020-08-17 RX ADMIN — OXYCODONE HYDROCHLORIDE 10 MG: 10 TABLET ORAL at 08:36

## 2020-08-17 RX ADMIN — GABAPENTIN 400 MG: 400 CAPSULE ORAL at 11:37

## 2020-08-17 RX ADMIN — SENNOSIDES-DOCUSATE SODIUM TAB 8.6-50 MG 2 TABLET: 8.6-5 TAB at 08:15

## 2020-08-17 RX ADMIN — MORPHINE SULFATE 2 MG: 4 INJECTION INTRAVENOUS at 22:10

## 2020-08-17 RX ADMIN — POLYETHYLENE GLYCOL 3350 1 PACKET: 17 POWDER, FOR SOLUTION ORAL at 17:10

## 2020-08-17 ASSESSMENT — ENCOUNTER SYMPTOMS
FALLS: 0
TINGLING: 0
SORE THROAT: 0
DEPRESSION: 0
PND: 0
SEIZURES: 0
FOCAL WEAKNESS: 0
TREMORS: 0
HALLUCINATIONS: 0
WHEEZING: 0
SPUTUM PRODUCTION: 0
NAUSEA: 0
BLOOD IN STOOL: 0
SHORTNESS OF BREATH: 0
SENSORY CHANGE: 0
DIARRHEA: 0
WEAKNESS: 1
BACK PAIN: 0
MYALGIAS: 1
LOSS OF CONSCIOUSNESS: 0
NERVOUS/ANXIOUS: 1
CHILLS: 0
ABDOMINAL PAIN: 0
HEADACHES: 0
DIZZINESS: 0
SPEECH CHANGE: 0
DOUBLE VISION: 0
PHOTOPHOBIA: 0
FLANK PAIN: 0
BLURRED VISION: 0
FEVER: 0

## 2020-08-17 NOTE — CARE PLAN
Problem: Fluid Volume:  Goal: Will maintain balanced intake and output  Outcome: PROGRESSING AS EXPECTED  Note: Encouraged oral fluid intake.     Problem: Communication  Goal: The ability to communicate needs accurately and effectively will improve  Outcome: PROGRESSING AS EXPECTED  Note: Patient is able to effectively communicate needs to staff.      Problem: Urinary Elimination:  Goal: Ability to reestablish a normal urinary elimination pattern will improve  Outcome: PROGRESSING AS EXPECTED  Flowsheets (Taken 8/17/2020 0930)  Urinary Elimination: Catheter (Document on LDA)  Note: Andres catheter in place per physician order.

## 2020-08-17 NOTE — CARE PLAN
Received report from day shift nurse.   Assumed pt care at approximately 1900.  Pt is A&Ox4, resting comfortably in bed.   Pt on 2L nasal canula for comfort. No signs of SOB/respiratory distress.   Labs noted, VSS.   Pt c/o 7/10 pain at this moment, medicated per MAR.    Assessment completed, pt was able to ambulate in [place] with CNA, wound vac dressing on scrotum reinforced otherwise C/D/I.   Fall precautions in place.   Bed at lowest position.   Call light and personal belongings within reach. Continue to monitor         Problem: Pain Management  Goal: Pain level will decrease to patient's comfort goal  Outcome: PROGRESSING AS EXPECTED     Problem: Mobility  Goal: Risk for activity intolerance will decrease  Outcome: PROGRESSING AS EXPECTED

## 2020-08-17 NOTE — WOUND TEAM
[facility]  Inpatient Services  Wound and Skin Care Progress note    Admission Date: 7/25/2020     Last order of IP CONSULT TO WOUND CARE was found on 7/30/2020 from Hospital Encounter on 7/25/2020     HPI, PMH, SH: Reviewed    Unit where seen by Wound Team: 2215/01     WOUND CONSULT/FOLLOW-UP RELATED TO:  NPWT Change    Self Report / Pain Level:  10/10 pain despite IV morphine with drsg removal. Pt then got new order for Ativan and it was given, pain mostly controlled and drsg change could continue       OBJECTIVE:  Pt on pressure redistribution mattress with waffle overlay, drsg had become compromised @ 1145    WOUND TYPE, LOCATION, CHARACTERISTICS (Pressure Injuries: location, stage, POA or date identified)  Wound 07/25/20 Full Thickness Wound Scrotum Right (Active)      08/17/20 1430   Site Assessment Red;Granulation tissue    Periwound Assessment Intact;Induration    Margins Defined edges    Closure Secondary intention    Drainage Amount Small    Drainage Description Serosanguineous    Treatments Cleansed    Wound Cleansing Normal Saline Irrigation    Periwound Protectant Benzoin;Paste Ring    Dressing Cleansing/Solutions Normal Saline    Dressing Options Wound Vac    Dressing Changed Changed    Dressing Status Intact    Dressing Change/Treatment Frequency Monday, Wednesday, Friday, and As Needed    NEXT Dressing Change/Treatment Date 08/19/20    NEXT Weekly Photo (Inpatient Only) 08/24/20    Non-staged Wound Description Full thickness    Wound Length (cm) 11 cm 08/17/20 1430   Wound Width (cm) 5 cm 08/17/20 1430   Wound Depth (cm) 4.3 cm 08/17/20 1430   Wound Surface Area (cm^2) 55 cm^2 08/17/20 1430   Wound Volume (cm^3) 236.5 cm^3 08/17/20 1430   Wound Healing % -431 08/17/20 1430   Wound Bed Granulation (%) 100 % 08/17/20 1430   Wound Bed Slough (%) 0 % 08/17/20 1430   Wound Bed Eschar (%) 0 % 08/17/20 1430   Tunneling (cm) 3 cm 08/17/20 1430   Tunneling Clock Position of Wound 11 08/17/20  1430   Shape irregular    Wound Odor None    Pulses N/A    Exposed Structures Other (Comments)    Number of days: 23        Negative Pressure Wound Therapy 07/27/20 Surgical (Active)   NPWT Pump Mode / Pressure Setting Ulta    Dressing Type Medium;Black Foam (Veraflo)    Number of Foam Pieces Used 2    Canister Changed Yes    NEXT Dressing Change/Treatment Date 08/19/20    VAC VeraFlo Irrigant Normal Saline    VAC VeraFlo Soak Time (mins) 4    VAC VeraFlo Instill Volume (ml) 16    VAC VeraFlo - Therapy Time (hrs) 2.5    VAC VeraFlo Pressure (mm/Hg) Continuous;125 mmHg        Vascular:    JW:   No results found.      Lab Values:    Lab Results   Component Value Date/Time    WBC 7.7 08/17/2020 04:02 AM    RBC 4.65 (L) 08/17/2020 04:02 AM    HEMOGLOBIN 11.5 (L) 08/17/2020 04:02 AM    HEMATOCRIT 38.4 (L) 08/17/2020 04:02 AM    CREACTPROT 0.58 07/01/2020 07:03 AM    SEDRATEWES 15 07/01/2020 07:03 AM    HBA1C 8.6 (A) 01/07/2020 07:14 AM          Culture Results show:  Recent Results (from the past 720 hour(s))   CULTURE WOUND W/ GRAM STAIN    Collection Time: 07/31/20  6:45 PM    Specimen: Wound   Result Value Ref Range    Significant Indicator POS (POS)     Source WND     Site SCROTAL ABSCESS     Culture Result (A)      Growth noted after further incubation, see below for  organism identification.      Gram Stain Result       Many WBCs.  Rare Gram positive cocci.  Rare Yeast.      Culture Result Candida albicans  Rare growth   (A)          INTERVENTIONS BY WOUND TEAM:  Soaked drsg with NS and lidocaine. Removed drape and paste ring with adhesive remover spray. Started to remove foam with lidocaine being drizzled into wound and pt had 10/10 pain. Had to stop Tx and request new order for Ativan.Order received and pt was given medicine. Able to remove rest of foam, clean wound with NS, and dry wound. No Sting and paste ring to sharmaine-wound, Black foam x 2 pieces into wound bed. Sealed and Veraflo resumed @ 16 mL installation  for 4 min soak every 2.5 H. Linens and gown changed. Pt repositioned.     Interdisciplinary consultation: Patient, Bedside RN      EVALUATION: Pt's wound continues to improve. He still has some discoloration and slight induration R suprapubic area.     Goals: Steady decrease in wound area and depth weekly.    NURSING PLAN OF CARE ORDERS (X):     Dressing changes: Continue previous Dressing Maintenance orders:  x      See new Dressing Maintenance orders:       Skin care: See Skin Care orders:        Rectal tube care: See Rectal Tube Care orders:      Other orders:           WOUND TEAM PLAN OF CARE:   Dressing changes by wound team:          Follow up 1-2 times weekly:               Follow up 3 times weekly:                NPWT change 3 times weekly:   x  Follow up as needed:       Other (explain):     Anticipated discharge plans:   [facility]:        SNF/Rehab:                  Home Care:           Outpatient Wound Center:            Self Care:           Other: will need VAC and drsg changes waiting for bed @ LT[facility]

## 2020-08-17 NOTE — PALLIATIVE CARE
Palliative Care follow-up  PC RN discussed with Dr. Cantu and BS RN. Updated that PC team can make meeting with pt's family tomorrow. Discussed with pt's dtr Charla via phone. Plan for 3pm goals of care meeting tomorrow.       Updated: LOLA Jose    Plan: GOC meeting at 3pm Tues 8/18.     Thank you for allowing Palliative Care to support this patient and family. Contact [phone] for additional assistance, change in patient status, or with any questions/concerns.

## 2020-08-17 NOTE — DISCHARGE PLANNING
Agency/Facility Name: [facility]   Spoke To: Will ( liaison )   Outcome: CCA left voicemail for f/u regarding bed availability.        @[phone]  Agency/Facility Name: [facility]   Spoke To: Will ( liaison )   Outcome: Per Will at this point pt has completed IV antibiotics and care would not qualify for LTAC, but Will states he will speak with leaders to see if pt may still be accepted.      @[phone]  Agency/Facility Name: [facility]   Spoke To: Will ( liaison )   Outcome: Per Will pt can no longer be accepted to [facility] as IV antibiotics are complete. Pt no longer meets LTAC needs. CCA informed LSW TOSIN Cunha.

## 2020-08-17 NOTE — PROGRESS NOTES
Received report from JOSLYN Delacruz. Assumed care at 0700. Patient is A/Ox4, c/o 7/10 pain. Dressing to scrotum is CDI, wound vac in place. Plan of care discussed including wound vac change today, shower and pain control. Patient verbalized understanding. Fall precautions in place, call light within reach. Hourly rounding in place.

## 2020-08-17 NOTE — PROGRESS NOTES
Hospital Medicine Daily Progress Note    Date of Service  8/17/2020    Chief Complaint  69 y.o. male admitted 7/25/2020 with scrotal swelling and pain    Hospital Course    History of coronary artery disease status post stent in the past, type 2 diabetes, paroxysmal atrial fibrillation on Eliquis, polycythemia on hydroxyurea, SLE on Imuran, admitted for scrotal pain and swelling found to have a right scrotal abscess and Landon's gangrene.  Urology was emergently consulted and completed a washout on 725.  Since then, he has undergone 3 additional washouts on 7/29, 7/31, 8/3.  He has a wound VAC in place and is having significant discomfort with dressing changes.  Overall, infectious markers have improved.  Cultures grew Candida albicans therefore Diflucan has been added to his antibiotic regimen.      Interval Problem Update  8/11: Tolerating antibiotics, gabapentin is helping with shooting nerve pain.  Still requiring IV Ativan and Dilaudid prior to dressing changes.  Disposition pending, needs to work with PT OT.  8/12: Tolerating antibiotics, medicated for dressing change today.  Gabapentin increased.  Glucose is well controlled in the 150s to 160s.  8/13: Feels drowsy today, pain control has improved significantly.  Glucose is at goal.  MS Contin discontinued, hydroxyurea dose increased to 1000 daily.  I discussed the case with Dr. Sims for dose recommendations  8/14: Poor pain control overnight after MS Contin was discontinued, resumed.  Platelets improved with increased hydroxyurea dose.  Still with significant pain with dressing changes.  Tolerated abx  8/15: Platelets increased to 900s, hydroxyurea increased, repeat in AM.  C/O drowsiness and double vision.  Reduced narcotics, serial neuro exams  8/16: Platelets are rising, tolerating increased hydroxyurea 1500 mg.  Discussed pain control and CODE STATUS, palliative care consulted.  8/17: Platelets increased, I discussed with Dr. Sims, increase  hydroxyurea to 2000 mg.  Pending palliative care consultation.  Pain is well controlled.      Consultants/Specialty  Dr. Rosales, urology  Dr. Sims - by phone    Code Status  Full Code    Disposition  SNF versus LTAC    Review of Systems  Review of Systems   Constitutional: Negative for chills and fever.        Comfortable   HENT: Negative for hearing loss, sore throat and tinnitus.    Eyes: Negative for blurred vision, double vision and photophobia.   Respiratory: Negative for sputum production, shortness of breath and wheezing.    Cardiovascular: Negative for chest pain, leg swelling and PND.   Gastrointestinal: Negative for abdominal pain, blood in stool, diarrhea and nausea.   Genitourinary: Negative for dysuria, flank pain, frequency, hematuria and urgency.        Shooting pain in R testicle, radiating into abdomen.  Gabapentin helps with this.   Musculoskeletal: Positive for myalgias (Controlled with current regimen). Negative for back pain, falls and joint pain.   Neurological: Positive for weakness (Improving). Negative for dizziness, tingling, tremors, sensory change, speech change, focal weakness, seizures, loss of consciousness and headaches.   Psychiatric/Behavioral: Negative for depression and hallucinations. The patient is nervous/anxious.    All other systems reviewed and are negative.       Physical Exam  Temp:  [36.6 °C (97.8 °F)-36.9 °C (98.5 °F)] 36.7 °C (98 °F)  Pulse:  [62-71] 68  Resp:  [18-19] 18  BP: (120-143)/(70-80) 139/80  SpO2:  [96 %-97 %] 96 %    Physical Exam  Constitutional:       Appearance: Normal appearance. He is not ill-appearing or diaphoretic.      Comments: LIstless, weak   HENT:      Head: Normocephalic and atraumatic.      Nose: Nose normal.      Mouth/Throat:      Mouth: Mucous membranes are dry.   Eyes:      General: No scleral icterus.        Right eye: No discharge.         Left eye: No discharge (.recent).      Extraocular Movements: Extraocular movements intact.       Conjunctiva/sclera: Conjunctivae normal.      Pupils: Pupils are equal, round, and reactive to light.   Neck:      Musculoskeletal: Normal range of motion and neck supple.   Cardiovascular:      Rate and Rhythm: Normal rate and regular rhythm.      Heart sounds: No murmur. No gallop.    Pulmonary:      Effort: Pulmonary effort is normal. No respiratory distress.      Breath sounds: Normal breath sounds. No stridor.   Abdominal:      General: Abdomen is flat. Bowel sounds are normal. There is no distension.      Palpations: Abdomen is soft.      Tenderness: There is no abdominal tenderness.   Genitourinary:     Comments: Edema and induration in the groin and perineum  Musculoskeletal:         General: No swelling, tenderness or deformity.   Skin:     General: Skin is warm and dry.      Coloration: Skin is pale.   Neurological:      General: No focal deficit present.      Mental Status: He is oriented to person, place, and time. Mental status is at baseline.      Cranial Nerves: No cranial nerve deficit.      Coordination: Coordination normal.      Comments: Eyes track symmetrically, pupils equal   Psychiatric:         Mood and Affect: Mood normal.         Behavior: Behavior is slowed.         Thought Content: Thought content normal.         Fluids    Intake/Output Summary (Last 24 hours) at 8/17/2020 1332  Last data filed at 8/17/2020 0606  Gross per 24 hour   Intake 200 ml   Output 1150 ml   Net -950 ml       Laboratory  Recent Labs     08/15/20  0408 08/16/20  0417 08/17/20  0402   WBC 8.6 7.4 7.7   RBC 4.84 4.80 4.65*   HEMOGLOBIN 12.0* 11.9* 11.5*   HEMATOCRIT 40.5* 40.0* 38.4*   MCV 83.7 83.3 82.6   MCH 24.8* 24.8* 24.7*   MCHC 29.6* 29.8* 29.9*   RDW 55.0* 54.4* 54.6*   PLATELETCT 914* 920* 942*   MPV 11.0 11.0 10.9     Recent Labs     08/15/20  0408 08/16/20  0417 08/17/20  0402   SODIUM 141 140 138   POTASSIUM 3.7 3.9 3.5*   CHLORIDE 107 106 104   CO2 27 26 24   GLUCOSE 145* 168* 167*   BUN 9 8 11    CREATININE 0.89 0.86 0.91   CALCIUM 8.8 8.8 8.7                   Imaging  EC-ECHOCARDIOGRAM COMPLETE W/ CONT   Final Result      EC-ECHOCARDIOGRAM COMPLETE W/O CONT   Final Result      IR-MIDLINE CATHETER INSERTION WO GUIDANCE > AGE 3   Final Result                  Ultrasound-guided midline placement performed by qualified nursing staff    as above.          DX-CHEST-PORTABLE (1 VIEW)   Final Result         1. No acute cardiopulmonary abnormalities are identified.      CT-ABDOMEN-PELVIS WITH   Final Result      1.  Diffuse scrotal edema, eccentric to the RIGHT with extensive soft tissue gas present particularly in the RIGHT groin and hemiscrotum, consistent with Landon's gangrene.   2.  No discrete drainable abscess demonstrated.   3.  Minimal retroperitoneal adenopathy, nonspecific.   4.  Nonobstructing LEFT kidney stone.   5.  Splenomegaly.   6.  Cystic lesion in the pancreatic tail measuring 19 mm.  Neoplasm is not excluded.  Consider further evaluation with MRI.      These findings were discussed with AYALA HAMPTON on 7/25/2020 3:41 PM.      DX-CHEST-PORTABLE (1 VIEW)   Final Result      1.  There is no acute cardiopulmonary process.           Assessment/Plan  Stenosis of right carotid artery-Right CEA 3/21/18  Assessment & Plan  Continue aspirin, Eliquis  Statin allergy    * Landon's gangrene of scrotum  Assessment & Plan  Severe involving right testicle, status post multiple washouts by urology on 7/25, 7/29, 7/31, 8/3.  Pain control has been a challenge but stable with current regimen:  Gabapentin 400 TID, MS contin 15 BID, PRN oxy 5-10 q4h PRN  IV morphine PRN for dressing changes only  Completed two weeks of unasyn and fluconazole (8/17/20)  Limit immunosuppression as much as possible to facilitate healing  Tight glucose control, goal is less than 150  Wound care, PT/OT  Acceptance to SNF vs LTAC pending    CAD (coronary artery disease)  Assessment & Plan  No acute events  Continue aspirin,  eliquis  Statin allergy      Type 2 diabetes mellitus, with long-term current use of insulin ([facility])  Assessment & Plan  -Lantus 22 units- whs  -SSI      Chronic systolic CHF (congestive heart failure) ([facility])  Assessment & Plan  I repeated an echo for serial monitoring of his hospital stay and his EF was unchanged.  He has grade 1 diastolic dysfunction and global hypokinesis  Will give 1 L of gentle fluids in order to hemodiluted his platelets however will need to watch for any evidence of respiratory compromise or fluid overload  Continue Eliquis  Continue ASA    Paroxysmal A-fib ([facility])  Assessment & Plan  Continue Eliquis, as there are no further washouts planned at this point  -Rate controlled    Polycythemia  Assessment & Plan  Plts have been trending up despite increasing dose of hydroxyurea.  I discussed the case with Dr. Sims of hematology as patient has a history of MI and TIA when platelets were greater than 1 million in the past  Increase hydroxyurea to 2000 mg nightly  Give 1L of IVF for hemodilution  I discussed this plan in detail with the patient and his daughter, Charla  Goal is 500-700  Trend CBC with differential daily to monitor for leukopenia    Advanced care planning/counseling discussion  Assessment & Plan  I discussed goals of care at bedside but patient requested a more in depth conversation  I consulted palliative care, pending exact time of family conference either 8/17 or 8/18    Constipation  Assessment & Plan  Due to narcotics, advance bowel regimen    Debility  Assessment & Plan  -Patient deconditioned status   PT OT  -Extensive RIGHT scrotal would    Pancreatic cyst  Assessment & Plan  Follow up with imaging as outpatient      SLE (systemic lupus erythematosus related syndrome) ([facility])  Assessment & Plan  Prednisone on hold secondary to infection -he has had no e/o adrenal insufficiency  Has not had steroid since 7/31/2020         Total time:  40 minutes.  I spent greater than 50% of the time  for patient care, counseling, and coordination on this date, including unit/floor time, and face-to-face time with the patient as per interval events and assessment and plan above      VTE prophylaxis: Eliquis

## 2020-08-18 PROBLEM — R54 AGE-RELATED PHYSICAL DEBILITY: Status: ACTIVE | Noted: 2020-08-07

## 2020-08-18 LAB
ALBUMIN SERPL BCP-MCNC: 2.6 G/DL (ref 3.2–4.9)
BASOPHILS # BLD AUTO: 1.3 % (ref 0–1.8)
BASOPHILS # BLD AUTO: 1.4 % (ref 0–1.8)
BASOPHILS # BLD: 0.11 K/UL (ref 0–0.12)
BASOPHILS # BLD: 0.12 K/UL (ref 0–0.12)
BUN SERPL-MCNC: 10 MG/DL (ref 8–22)
CALCIUM SERPL-MCNC: 8.7 MG/DL (ref 8.4–10.2)
CHLORIDE SERPL-SCNC: 105 MMOL/L (ref 96–112)
CO2 SERPL-SCNC: 25 MMOL/L (ref 20–33)
CREAT SERPL-MCNC: 0.82 MG/DL (ref 0.5–1.4)
EOSINOPHIL # BLD AUTO: 0.35 K/UL (ref 0–0.51)
EOSINOPHIL # BLD AUTO: 0.35 K/UL (ref 0–0.51)
EOSINOPHIL NFR BLD: 3.8 % (ref 0–6.9)
EOSINOPHIL NFR BLD: 4.3 % (ref 0–6.9)
ERYTHROCYTE [DISTWIDTH] IN BLOOD BY AUTOMATED COUNT: 53.8 FL (ref 35.9–50)
ERYTHROCYTE [DISTWIDTH] IN BLOOD BY AUTOMATED COUNT: 54.5 FL (ref 35.9–50)
GLUCOSE BLD-MCNC: 137 MG/DL (ref 65–99)
GLUCOSE BLD-MCNC: 150 MG/DL (ref 65–99)
GLUCOSE BLD-MCNC: 151 MG/DL (ref 65–99)
GLUCOSE BLD-MCNC: 162 MG/DL (ref 65–99)
GLUCOSE SERPL-MCNC: 157 MG/DL (ref 65–99)
HCT VFR BLD AUTO: 39 % (ref 42–52)
HCT VFR BLD AUTO: 39.9 % (ref 42–52)
HGB BLD-MCNC: 11.8 G/DL (ref 14–18)
HGB BLD-MCNC: 12 G/DL (ref 14–18)
IMM GRANULOCYTES # BLD AUTO: 0.09 K/UL (ref 0–0.11)
IMM GRANULOCYTES # BLD AUTO: 0.13 K/UL (ref 0–0.11)
IMM GRANULOCYTES NFR BLD AUTO: 1 % (ref 0–0.9)
IMM GRANULOCYTES NFR BLD AUTO: 1.6 % (ref 0–0.9)
LYMPHOCYTES # BLD AUTO: 0.98 K/UL (ref 1–4.8)
LYMPHOCYTES # BLD AUTO: 1.01 K/UL (ref 1–4.8)
LYMPHOCYTES NFR BLD: 10.6 % (ref 22–41)
LYMPHOCYTES NFR BLD: 12.4 % (ref 22–41)
MAGNESIUM SERPL-MCNC: 2 MG/DL (ref 1.5–2.5)
MCH RBC QN AUTO: 24.8 PG (ref 27–33)
MCH RBC QN AUTO: 25 PG (ref 27–33)
MCHC RBC AUTO-ENTMCNC: 30.1 G/DL (ref 33.7–35.3)
MCHC RBC AUTO-ENTMCNC: 30.3 G/DL (ref 33.7–35.3)
MCV RBC AUTO: 82.6 FL (ref 81.4–97.8)
MCV RBC AUTO: 82.6 FL (ref 81.4–97.8)
MONOCYTES # BLD AUTO: 0.47 K/UL (ref 0–0.85)
MONOCYTES # BLD AUTO: 0.55 K/UL (ref 0–0.85)
MONOCYTES NFR BLD AUTO: 5.8 % (ref 0–13.4)
MONOCYTES NFR BLD AUTO: 5.9 % (ref 0–13.4)
NEUTROPHILS # BLD AUTO: 6.06 K/UL (ref 1.82–7.42)
NEUTROPHILS # BLD AUTO: 7.17 K/UL (ref 1.82–7.42)
NEUTROPHILS NFR BLD: 74.5 % (ref 44–72)
NEUTROPHILS NFR BLD: 77.4 % (ref 44–72)
NRBC # BLD AUTO: 0 K/UL
NRBC # BLD AUTO: 0.02 K/UL
NRBC BLD-RTO: 0 /100 WBC
NRBC BLD-RTO: 0.2 /100 WBC
PHOSPHATE SERPL-MCNC: 3.6 MG/DL (ref 2.5–4.5)
PLATELET # BLD AUTO: 961 K/UL (ref 164–446)
PLATELET # BLD AUTO: 979 K/UL (ref 164–446)
PMV BLD AUTO: 10.2 FL (ref 9–12.9)
PMV BLD AUTO: 10.6 FL (ref 9–12.9)
POTASSIUM SERPL-SCNC: 3.8 MMOL/L (ref 3.6–5.5)
RBC # BLD AUTO: 4.72 M/UL (ref 4.7–6.1)
RBC # BLD AUTO: 4.83 M/UL (ref 4.7–6.1)
SODIUM SERPL-SCNC: 140 MMOL/L (ref 135–145)
WBC # BLD AUTO: 8.1 K/UL (ref 4.8–10.8)
WBC # BLD AUTO: 9.3 K/UL (ref 4.8–10.8)

## 2020-08-18 PROCEDURE — 83735 ASSAY OF MAGNESIUM: CPT

## 2020-08-18 PROCEDURE — A9270 NON-COVERED ITEM OR SERVICE: HCPCS | Performed by: INTERNAL MEDICINE

## 2020-08-18 PROCEDURE — A9270 NON-COVERED ITEM OR SERVICE: HCPCS | Performed by: HOSPITALIST

## 2020-08-18 PROCEDURE — 85025 COMPLETE CBC W/AUTO DIFF WBC: CPT

## 2020-08-18 PROCEDURE — 82962 GLUCOSE BLOOD TEST: CPT | Mod: 91

## 2020-08-18 PROCEDURE — 83540 ASSAY OF IRON: CPT

## 2020-08-18 PROCEDURE — 700111 HCHG RX REV CODE 636 W/ 250 OVERRIDE (IP): Performed by: NURSE PRACTITIONER

## 2020-08-18 PROCEDURE — 82728 ASSAY OF FERRITIN: CPT

## 2020-08-18 PROCEDURE — 700102 HCHG RX REV CODE 250 W/ 637 OVERRIDE(OP): Performed by: HOSPITALIST

## 2020-08-18 PROCEDURE — 700102 HCHG RX REV CODE 250 W/ 637 OVERRIDE(OP): Performed by: INTERNAL MEDICINE

## 2020-08-18 PROCEDURE — 770006 HCHG ROOM/CARE - MED/SURG/GYN SEMI*

## 2020-08-18 PROCEDURE — 36415 COLL VENOUS BLD VENIPUNCTURE: CPT

## 2020-08-18 PROCEDURE — 99232 SBSQ HOSP IP/OBS MODERATE 35: CPT | Performed by: HOSPITALIST

## 2020-08-18 PROCEDURE — 700111 HCHG RX REV CODE 636 W/ 250 OVERRIDE (IP): Performed by: INTERNAL MEDICINE

## 2020-08-18 PROCEDURE — 80069 RENAL FUNCTION PANEL: CPT

## 2020-08-18 PROCEDURE — 700101 HCHG RX REV CODE 250: Performed by: INTERNAL MEDICINE

## 2020-08-18 RX ORDER — HYDROXYUREA 500 MG/1
CAPSULE ORAL
Status: ACTIVE
Start: 2020-08-18 | End: 2020-08-19

## 2020-08-18 RX ORDER — HYDROXYUREA 500 MG/1
2500 CAPSULE ORAL NIGHTLY
Status: DISCONTINUED | OUTPATIENT
Start: 2020-08-18 | End: 2020-08-22

## 2020-08-18 RX ADMIN — POLYETHYLENE GLYCOL 3350 1 PACKET: 17 POWDER, FOR SOLUTION ORAL at 08:56

## 2020-08-18 RX ADMIN — APIXABAN 5 MG: 5 TABLET, FILM COATED ORAL at 08:56

## 2020-08-18 RX ADMIN — HYDROXYUREA 2500 MG: 500 CAPSULE ORAL at 21:21

## 2020-08-18 RX ADMIN — INSULIN LISPRO 2 UNITS: 100 INJECTION, SOLUTION INTRAVENOUS; SUBCUTANEOUS at 05:23

## 2020-08-18 RX ADMIN — OXYCODONE HYDROCHLORIDE 10 MG: 10 TABLET ORAL at 08:59

## 2020-08-18 RX ADMIN — SENNOSIDES-DOCUSATE SODIUM TAB 8.6-50 MG 2 TABLET: 8.6-5 TAB at 08:56

## 2020-08-18 RX ADMIN — SENNOSIDES-DOCUSATE SODIUM TAB 8.6-50 MG 2 TABLET: 8.6-5 TAB at 17:16

## 2020-08-18 RX ADMIN — POLYETHYLENE GLYCOL 3350 1 PACKET: 17 POWDER, FOR SOLUTION ORAL at 17:49

## 2020-08-18 RX ADMIN — INSULIN LISPRO 4 UNITS: 100 INJECTION, SOLUTION INTRAVENOUS; SUBCUTANEOUS at 17:45

## 2020-08-18 RX ADMIN — THERA TABS 1 TABLET: TAB at 08:55

## 2020-08-18 RX ADMIN — MORPHINE SULFATE 4 MG: 4 INJECTION INTRAVENOUS at 18:40

## 2020-08-18 RX ADMIN — GABAPENTIN 400 MG: 400 CAPSULE ORAL at 08:56

## 2020-08-18 RX ADMIN — MORPHINE SULFATE 15 MG: 15 TABLET, EXTENDED RELEASE ORAL at 17:16

## 2020-08-18 RX ADMIN — AZATHIOPRINE 50 MG: 50 TABLET ORAL at 08:57

## 2020-08-18 RX ADMIN — OMEPRAZOLE 20 MG: 20 CAPSULE, DELAYED RELEASE ORAL at 08:55

## 2020-08-18 RX ADMIN — AZATHIOPRINE 50 MG: 50 TABLET ORAL at 17:37

## 2020-08-18 RX ADMIN — GABAPENTIN 400 MG: 400 CAPSULE ORAL at 11:26

## 2020-08-18 RX ADMIN — OXYCODONE HYDROCHLORIDE 10 MG: 10 TABLET ORAL at 13:09

## 2020-08-18 RX ADMIN — APIXABAN 5 MG: 5 TABLET, FILM COATED ORAL at 17:17

## 2020-08-18 RX ADMIN — ASPIRIN 81 MG: 81 TABLET, COATED ORAL at 08:56

## 2020-08-18 RX ADMIN — GABAPENTIN 500 MG: 100 CAPSULE ORAL at 17:16

## 2020-08-18 RX ADMIN — MORPHINE SULFATE 15 MG: 15 TABLET, EXTENDED RELEASE ORAL at 08:55

## 2020-08-18 RX ADMIN — INSULIN LISPRO 4 UNITS: 100 INJECTION, SOLUTION INTRAVENOUS; SUBCUTANEOUS at 11:26

## 2020-08-18 RX ADMIN — OXYCODONE HYDROCHLORIDE 10 MG: 10 TABLET ORAL at 17:16

## 2020-08-18 ASSESSMENT — ENCOUNTER SYMPTOMS
NERVOUS/ANXIOUS: 1
SORE THROAT: 0
SPEECH CHANGE: 0
HALLUCINATIONS: 0
SEIZURES: 0
BLURRED VISION: 0
FOCAL WEAKNESS: 0
TREMORS: 0
FALLS: 0
DOUBLE VISION: 0
DIARRHEA: 0
DIZZINESS: 0
LOSS OF CONSCIOUSNESS: 0
NAUSEA: 0
BLOOD IN STOOL: 0
SPUTUM PRODUCTION: 0
FEVER: 0
SHORTNESS OF BREATH: 0
HEADACHES: 0
PND: 0
FLANK PAIN: 0
BACK PAIN: 0
DEPRESSION: 0
CHILLS: 0
MYALGIAS: 1
TINGLING: 0
PHOTOPHOBIA: 0
ABDOMINAL PAIN: 0
SENSORY CHANGE: 0
WEAKNESS: 1
WHEEZING: 0

## 2020-08-18 NOTE — PROGRESS NOTES
Hospital Medicine Daily Progress Note    Date of Service  8/18/2020    Chief Complaint  Scrotal pain and swelling    Hospital Course    Mr. Schulte is a 70-year-old male with PMH significant for CAD, chronic systolic CHF, PAF on no rate control medication chronically anticoagulated with apixaban, right CEA, and DM 2 who presented 7/25/2020 with scrotal pain and swelling.  He was found to have right scrotal abscess and Landon's gangrene.  Urology was emergently consulted and patient underwent washout on 7/25 with 3 more subsequent washouts on 7/29, 7/31, and 8/3 with wound VAC placement.      Interval Problem Update  -Still no BM.  Last BM 8/14  -[place] amb[place] in the [place] with CNA this morning  -Plan for meeting with family and palliative care at 1500 today    Consultants/Specialty  Palliative care    Code Status  Full Code    Disposition  TBD -Per recent CM note, SNF referral will be updated and resubmitted once the patient no longer requires IV pain meds for the wound dressing changes    Review of Systems  Review of Systems   Constitutional: Negative for chills and fever.        Comfortable   HENT: Negative for hearing loss, sore throat and tinnitus.    Eyes: Negative for blurred vision, double vision and photophobia.   Respiratory: Negative for sputum production, shortness of breath and wheezing.    Cardiovascular: Negative for chest pain, leg swelling and PND.   Gastrointestinal: Negative for abdominal pain, blood in stool, diarrhea and nausea.   Genitourinary: Negative for dysuria, flank pain, frequency, hematuria and urgency.        Right testicular pain   Musculoskeletal: Positive for myalgias (Stable). Negative for back pain, falls and joint pain.   Neurological: Positive for weakness (Improving). Negative for dizziness, tingling, tremors, sensory change, speech change, focal weakness, seizures, loss of consciousness and headaches.   Psychiatric/Behavioral: Negative for depression and hallucinations. The  patient is nervous/anxious.    All other systems reviewed and are negative.       Physical Exam  Temp:  [36.6 °C (97.9 °F)-36.8 °C (98.3 °F)] 36.8 °C (98.2 °F)  Pulse:  [64-70] 70  Resp:  [16] 16  BP: (118-145)/(68-87) 121/80  SpO2:  [92 %-98 %] 98 %    Physical Exam  Vitals signs and nursing note reviewed.   Constitutional:       Appearance: Normal appearance. He is not ill-appearing or diaphoretic.   HENT:      Head: Normocephalic and atraumatic.      Nose: Nose normal.      Mouth/Throat:      Mouth: Mucous membranes are dry.   Eyes:      General: No scleral icterus.        Right eye: No discharge.         Left eye: No discharge (.recent).      Extraocular Movements: Extraocular movements intact.      Conjunctiva/sclera: Conjunctivae normal.      Pupils: Pupils are equal, round, and reactive to light.   Neck:      Musculoskeletal: Normal range of motion and neck supple.   Cardiovascular:      Rate and Rhythm: Normal rate and regular rhythm.      Heart sounds: No murmur. No gallop.    Pulmonary:      Effort: Pulmonary effort is normal. No respiratory distress.      Breath sounds: Normal breath sounds. No stridor.   Abdominal:      General: Abdomen is flat. Bowel sounds are normal. There is no distension.      Palpations: Abdomen is soft.      Tenderness: There is no abdominal tenderness.   Genitourinary:     Comments: Edema and induration in the groin and perineum  Musculoskeletal:         General: No swelling, tenderness or deformity.      Comments: Ambulating in [place] with C[place]   Skin:     General: Skin is warm and dry.      Coloration: Skin is pale.   Neurological:      General: No focal deficit present.      Mental Status: He is alert and oriented to person, place, and time. Mental status is at baseline.      Cranial Nerves: No cranial nerve deficit.      Motor: Motor function is intact.      Coordination: Coordination normal.   Psychiatric:         Mood and Affect: Mood normal.         Behavior: Behavior is  cooperative.         Thought Content: Thought content normal.         Cognition and Memory: Cognition normal.         Fluids    Intake/Output Summary (Last 24 hours) at 8/18/2020 1600  Last data filed at 8/18/2020 1208  Gross per 24 hour   Intake 840 ml   Output 1675 ml   Net -835 ml       Laboratory  Recent Labs     08/16/20 0417 08/17/20  0402 08/18/20  0430   WBC 7.4 7.7 8.1   RBC 4.80 4.65* 4.72   HEMOGLOBIN 11.9* 11.5* 11.8*   HEMATOCRIT 40.0* 38.4* 39.0*   MCV 83.3 82.6 82.6   MCH 24.8* 24.7* 25.0*   MCHC 29.8* 29.9* 30.3*   RDW 54.4* 54.6* 54.5*   PLATELETCT 920* 942* 961*   MPV 11.0 10.9 10.6     Recent Labs     08/16/20 0417 08/17/20  0402 08/18/20  0430   SODIUM 140 138 140   POTASSIUM 3.9 3.5* 3.8   CHLORIDE 106 104 105   CO2 26 24 25   GLUCOSE 168* 167* 157*   BUN 8 11 10   CREATININE 0.86 0.91 0.82   CALCIUM 8.8 8.7 8.7                   Imaging  EC-ECHOCARDIOGRAM COMPLETE W/ CONT   Final Result      EC-ECHOCARDIOGRAM COMPLETE W/O CONT   Final Result      IR-MIDLINE CATHETER INSERTION WO GUIDANCE > AGE 3   Final Result                  Ultrasound-guided midline placement performed by qualified nursing staff    as above.          DX-CHEST-PORTABLE (1 VIEW)   Final Result         1. No acute cardiopulmonary abnormalities are identified.      CT-ABDOMEN-PELVIS WITH   Final Result      1.  Diffuse scrotal edema, eccentric to the RIGHT with extensive soft tissue gas present particularly in the RIGHT groin and hemiscrotum, consistent with Landon's gangrene.   2.  No discrete drainable abscess demonstrated.   3.  Minimal retroperitoneal adenopathy, nonspecific.   4.  Nonobstructing LEFT kidney stone.   5.  Splenomegaly.   6.  Cystic lesion in the pancreatic tail measuring 19 mm.  Neoplasm is not excluded.  Consider further evaluation with MRI.      These findings were discussed with AYALA HAMPTON on 7/25/2020 3:41 PM.      DX-CHEST-PORTABLE (1 VIEW)   Final Result      1.  There is no acute  cardiopulmonary process.           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  Pain control has been a challenge but stable with current regimen:  Gabapentin 400 TID, MS contin 15 BID, PRN oxy 5-10 q4h PRN  IV morphine PRN for dressing changes only  Completed two weeks of unasyn and fluconazole (8/17/20)  Limit immunosuppression as much as possible to facilitate healing  Tight glucose control, goal is less than 150  Wound care, PT/OT  Acceptance to SNF vs LTAC pending    Stenosis of right carotid artery-Right CEA 3/21/18- (present on admission)  Assessment & Plan  Continue aspirin, Eliquis  Statin allergy    CAD (coronary artery disease)- (present on admission)  Assessment & Plan  -chronic  -denies chest pain  -continue Eliquis  -allergy to statins      Type 2 diabetes mellitus, with long-term current use of insulin ([facility])- (present on admission)  Assessment & Plan  -Lantus 22 units- whs  -SSI      Chronic systolic CHF (congestive heart failure) ([facility])- (present on admission)  Assessment & Plan  -without acute exacerbation  -ECHO this hospitalization unchanged from previous - EF 30%  -OP FU with Cardiology      Paroxysmal A-fib ([facility])- (present on admission)  Assessment & Plan  Continue Eliquis, as there are no further washouts planned at this point  -Rate controlled    Polycythemia- (present on admission)  Assessment & Plan  Plts have been trending up despite increasing dose of hydroxyurea.  I discussed the case with Dr. Sims of hematology as patient has a history of MI and TIA when platelets were greater than 1 million in the past  Increase hydroxyurea to 2000 mg nightly  Give 1L of IVF for hemodilution  I discussed this plan in detail with the patient and his daughter, Charla  Goal is 500-700  Trend CBC with differential daily to monitor for leukopenia    Advanced  care planning/counseling discussion  Assessment & Plan  -Palliative care have scheduled GO meeting today at 1500    Constipation  Assessment & Plan  -last BM 8/14  -continue bowel protocol - he has received Miralax today  -discussed with RN. Consider suppository today if patient amenable (perineal/anal area very tender)        Age-related physical debility  Assessment & Plan  -Multiple comorbidities  -Now with Landon's gangrene  -Body mass index is 26.13 kg/m².  -PT/OT  -Encourage p.o. intake  -3 times daily supplements  -Fall precautions  -Sleep hygiene  -Palliative care following.  Plan for goal of care meeting today at 1500      Pancreatic cyst  Assessment & Plan  Follow up with imaging as outpatient      SLE (systemic lupus erythematosus related syndrome) ([facility])  Assessment & Plan  Prednisone on hold secondary to infection -he has had no e/o adrenal insufficiency  Has not had steroid since 7/31/2020         VTE prophylaxis: Eliquis and ambulation    I have performed a physical exam and reviewed and updated ROS and Assessment/Plan today (08/18/20). In review of the previous note there are no changes except as documented above    Please note that this dictation was created using voice recognition software. I have made every reasonable attempt to correct obvious errors, but there may be errors of grammar and possibly content that I did not discover before finalizing the note.    CONNER Roberts.

## 2020-08-18 NOTE — CONSULTS
Reason for PC Consult: Advance Care Planning    Consulted by: Dr. Cantu; currently Dr. Sung    Assessment:  General: 69 y/o male from home 7/25 with testicular pain following a scrotal cyst rupture, infection, and subsequent hemorrhage. Pt found to have a scrotal abscess and Landon's gangrene s/p washout x4 and wound vac placement. PMHx of CAD w/ stent placement, DM, afib on AC, lupus RA, and polycythemia on hydroxyurea followed by Dr. Sims. Increasing platelet levels and hematology assisting with management of hydroxyurea. PC ordered to discuss code status and goals of care with pt and family    Social: Pt lives at home with his wife Alysa. They have 3 kids- Charla (RN in [place]), Aretha and Adin. He has a sister Susan in [place] who is also closely involved. Pt was independent with his ADLs prior to admission. He has been following with [facility] for the polycythemia since ~2017      Consults: surgical urology, ID    Dyspnea: No-    Last BM: 08/14/20-    Pain: Yes- improved from admission  Depression: Mood appropriate for situation-    Dementia: No;       Spiritual:  Is Adventist or spirituality important for coping with this illness? No-    Has a  or spiritual provider visit been requested? No    Palliative Performance Scale: 60%    Advance Directive: None-  Discussed in detail and copies left with Zion. Provided instruction on how to notarize/witness if completed outside of the hospital  DPOA: No- NOK is his wife Alysa    POLST: No-      Code Status: Full- discussed at length and Bob wished for an attempt at resuscitation but would not want any prolonged life support if unable to return to PLOF or if lacking brain function     Outcome:  PC RN met with Zion at bedside with Alysa, Aretha, Adin, Susan and Tyler (brother in law) by phone; PC explained the role of this RN to help with discussions about the current medical situation and goals moving forward. PC discussed plan to talk  "about the current medical situation and answer any questions Bob or the family has, as well as discussing his wishes in relation to his medical care. Bob started out by telling his family tearfully that he does not want to remain in any vegetative state or on machines if he's lacking brain activity should something extreme happen. He tells this RN that \"anything could happen at any time, like a stroke or heart attack, and I just want my family to be prepared.\" He states the \"blood cancer, lupus and heart issues are the perfect storm.\" He expressed worry that something sudden could happen and his family would not know what to do for him. He reports not being scared of dying but worrying that his family won't know \"just how much I love you all.\" PC commended the family for being open to having such a difficult conversation and noting how the open dialogue will alleviate the burden on the family should they ever have to make decisions. The family was all very tearful but expressed that they would support any decision that Bob made.    PC requested permission to discuss more specific goals, like CPR, intubation, and emergent interventions.Explored pt's values, beliefs, and preferences in order to identify GOC. Bob tells this RN that he would want an attempt at CPR and is okay with intubation, but if he is \"vegetative\" or \"not able to be like I am now, just let me go.\" PC explained that code status is fluid and can be changed based on a given situation, also noting that a DNR does not change anything else in his POC and he would still receive treatment just as he is now. The AD was discussed in detail and Bob requested it be left with he and his daughter to look at. PC RN again reminded them that the directive is aimed at providing the family with guidance, but keeping the ongoing dialogue is helpful as well. The pt/family denied any needs/questions at this time.    While talking to Bob and his family, PC RN " provided therapeutic communication, including open ended questions, reflective listening, and normalization of thought and feelings throughout encounter, as well as reinforced his goals of care. PC contact information provided to Charla and Bob and encouraged to call with any questions or concerns.  support provided to help Bob to continue processing his new emotions, but he declined at this time.     Updated: APRN    Plan: follow and support    Thank you for allowing Palliative Care to support this patient and family. Contact [phone] for additional assistance, change in patient status, or with any questions/concerns.

## 2020-08-18 NOTE — DISCHARGE PLANNING
Anticipated Discharge Disposition: SNF    Action: This case was discussed yesterday during IDT Rounds. LSW explained, [facility] declined. Per MD, a medical team meeting is scheduled for this afternoon to discuss the goals of care.    Barriers to Discharge: IV Pain Meds.    Plan: As Above. The SNF referral will be updated and resubmitted once the patient no longer requires IV Pain Meds for the wound dressing changes.

## 2020-08-18 NOTE — CARE PLAN
Received report from day shift nurse.   Assumed pt care at approximately 1900.  Pt is A&Ox4, resting comfortably in bed.   Pt on 2L nasal canula for comfort. No signs of SOB/respiratory distress.   Labs noted, VSS.   Pt c/o 8/10 pain at this moment, medicated per MAR.    Assessment completed, wound vac in place on right scrotum C/D/I dressing. Wound care changed wound vac dressing today, pt complaining of increased pain post wound vac dressing change.   Fall precautions in place.   Bed at lowest position.   Call light and personal belongings within reach. Continue to monitor         Problem: Pain Management  Goal: Pain level will decrease to patient's comfort goal  Outcome: PROGRESSING AS EXPECTED     Problem: Infection  Goal: Will remain free from infection  Outcome: PROGRESSING AS EXPECTED

## 2020-08-19 PROBLEM — M32.9 SLE (SYSTEMIC LUPUS ERYTHEMATOSUS RELATED SYNDROME) (HCC): Chronic | Status: ACTIVE | Noted: 2020-07-25

## 2020-08-19 LAB
FERRITIN SERPL-MCNC: 27.6 NG/ML (ref 22–322)
GLUCOSE BLD-MCNC: 130 MG/DL (ref 65–99)
GLUCOSE BLD-MCNC: 131 MG/DL (ref 65–99)
GLUCOSE BLD-MCNC: 182 MG/DL (ref 65–99)
GLUCOSE BLD-MCNC: 94 MG/DL (ref 65–99)
IRON SERPL-MCNC: 16 UG/DL (ref 50–180)

## 2020-08-19 PROCEDURE — A9270 NON-COVERED ITEM OR SERVICE: HCPCS | Performed by: INTERNAL MEDICINE

## 2020-08-19 PROCEDURE — 700102 HCHG RX REV CODE 250 W/ 637 OVERRIDE(OP): Performed by: INTERNAL MEDICINE

## 2020-08-19 PROCEDURE — 82962 GLUCOSE BLOOD TEST: CPT | Mod: 91

## 2020-08-19 PROCEDURE — 700111 HCHG RX REV CODE 636 W/ 250 OVERRIDE (IP): Performed by: NURSE PRACTITIONER

## 2020-08-19 PROCEDURE — 700101 HCHG RX REV CODE 250: Performed by: INTERNAL MEDICINE

## 2020-08-19 PROCEDURE — 700111 HCHG RX REV CODE 636 W/ 250 OVERRIDE (IP): Performed by: INTERNAL MEDICINE

## 2020-08-19 PROCEDURE — 700111 HCHG RX REV CODE 636 W/ 250 OVERRIDE (IP): Performed by: HOSPITALIST

## 2020-08-19 PROCEDURE — A9270 NON-COVERED ITEM OR SERVICE: HCPCS | Performed by: HOSPITALIST

## 2020-08-19 PROCEDURE — 97606 NEG PRS WND THER DME>50 SQCM: CPT

## 2020-08-19 PROCEDURE — 99232 SBSQ HOSP IP/OBS MODERATE 35: CPT | Performed by: HOSPITALIST

## 2020-08-19 PROCEDURE — 700102 HCHG RX REV CODE 250 W/ 637 OVERRIDE(OP): Performed by: HOSPITALIST

## 2020-08-19 PROCEDURE — 770006 HCHG ROOM/CARE - MED/SURG/GYN SEMI*

## 2020-08-19 RX ORDER — MORPHINE SULFATE 15 MG/1
15 TABLET, FILM COATED, EXTENDED RELEASE ORAL EVERY 12 HOURS
Status: DISCONTINUED | OUTPATIENT
Start: 2020-08-19 | End: 2020-08-22 | Stop reason: HOSPADM

## 2020-08-19 RX ORDER — LORAZEPAM 2 MG/ML
0.5 INJECTION INTRAMUSCULAR
Status: DISCONTINUED | OUTPATIENT
Start: 2020-08-19 | End: 2020-08-22 | Stop reason: HOSPADM

## 2020-08-19 RX ORDER — INSULIN GLARGINE 100 [IU]/ML
22 INJECTION, SOLUTION SUBCUTANEOUS NIGHTLY
Status: DISCONTINUED | OUTPATIENT
Start: 2020-08-19 | End: 2020-08-19

## 2020-08-19 RX ORDER — OMEPRAZOLE 20 MG/1
20 CAPSULE, DELAYED RELEASE ORAL DAILY
Status: DISCONTINUED | OUTPATIENT
Start: 2020-08-19 | End: 2020-08-22 | Stop reason: HOSPADM

## 2020-08-19 RX ORDER — MORPHINE SULFATE 10 MG/ML
5 INJECTION, SOLUTION INTRAMUSCULAR; INTRAVENOUS
Status: DISCONTINUED | OUTPATIENT
Start: 2020-08-19 | End: 2020-08-21

## 2020-08-19 RX ORDER — AMOXICILLIN 250 MG
2 CAPSULE ORAL 2 TIMES DAILY
Status: DISCONTINUED | OUTPATIENT
Start: 2020-08-19 | End: 2020-08-22 | Stop reason: HOSPADM

## 2020-08-19 RX ORDER — AZATHIOPRINE 50 MG/1
50 TABLET ORAL 2 TIMES DAILY
Status: DISCONTINUED | OUTPATIENT
Start: 2020-08-19 | End: 2020-08-22 | Stop reason: HOSPADM

## 2020-08-19 RX ORDER — DEXTROSE MONOHYDRATE 25 G/50ML
50 INJECTION, SOLUTION INTRAVENOUS
Status: DISCONTINUED | OUTPATIENT
Start: 2020-08-19 | End: 2020-08-22 | Stop reason: HOSPADM

## 2020-08-19 RX ORDER — BISACODYL 10 MG
10 SUPPOSITORY, RECTAL RECTAL
Status: DISCONTINUED | OUTPATIENT
Start: 2020-08-19 | End: 2020-08-22 | Stop reason: HOSPADM

## 2020-08-19 RX ORDER — POLYETHYLENE GLYCOL 3350 17 G/17G
1 POWDER, FOR SOLUTION ORAL 2 TIMES DAILY
Status: DISCONTINUED | OUTPATIENT
Start: 2020-08-19 | End: 2020-08-22 | Stop reason: HOSPADM

## 2020-08-19 RX ADMIN — SENNOSIDES-DOCUSATE SODIUM TAB 8.6-50 MG 2 TABLET: 8.6-5 TAB at 08:09

## 2020-08-19 RX ADMIN — APIXABAN 5 MG: 5 TABLET, FILM COATED ORAL at 20:50

## 2020-08-19 RX ADMIN — THERA TABS 1 TABLET: TAB at 08:10

## 2020-08-19 RX ADMIN — SENNOSIDES-DOCUSATE SODIUM TAB 8.6-50 MG 2 TABLET: 8.6-5 TAB at 20:49

## 2020-08-19 RX ADMIN — LIDOCAINE HYDROCHLORIDE 20 ML: 20 INJECTION, SOLUTION INFILTRATION; PERINEURAL at 15:09

## 2020-08-19 RX ADMIN — AZATHIOPRINE 50 MG: 50 TABLET ORAL at 20:50

## 2020-08-19 RX ADMIN — MORPHINE SULFATE 15 MG: 15 TABLET, EXTENDED RELEASE ORAL at 08:07

## 2020-08-19 RX ADMIN — OMEPRAZOLE 20 MG: 20 CAPSULE, DELAYED RELEASE ORAL at 08:10

## 2020-08-19 RX ADMIN — OXYCODONE HYDROCHLORIDE 10 MG: 10 TABLET ORAL at 08:07

## 2020-08-19 RX ADMIN — MORPHINE SULFATE 4 MG: 4 INJECTION INTRAVENOUS at 17:17

## 2020-08-19 RX ADMIN — AZATHIOPRINE 50 MG: 50 TABLET ORAL at 08:09

## 2020-08-19 RX ADMIN — LORAZEPAM 0.5 MG: 2 INJECTION INTRAMUSCULAR; INTRAVENOUS at 15:09

## 2020-08-19 RX ADMIN — APIXABAN 5 MG: 5 TABLET, FILM COATED ORAL at 08:10

## 2020-08-19 RX ADMIN — ASPIRIN 81 MG: 81 TABLET, COATED ORAL at 08:11

## 2020-08-19 RX ADMIN — GABAPENTIN 500 MG: 100 CAPSULE ORAL at 20:50

## 2020-08-19 RX ADMIN — MORPHINE SULFATE 5 MG: 10 INJECTION INTRAVENOUS at 15:37

## 2020-08-19 RX ADMIN — HYDROXYUREA 2500 MG: 500 CAPSULE ORAL at 20:49

## 2020-08-19 RX ADMIN — GABAPENTIN 500 MG: 100 CAPSULE ORAL at 13:54

## 2020-08-19 RX ADMIN — OXYCODONE HYDROCHLORIDE 10 MG: 10 TABLET ORAL at 13:55

## 2020-08-19 RX ADMIN — MORPHINE SULFATE 15 MG: 15 TABLET, EXTENDED RELEASE ORAL at 20:49

## 2020-08-19 RX ADMIN — OXYCODONE HYDROCHLORIDE 10 MG: 10 TABLET ORAL at 21:10

## 2020-08-19 RX ADMIN — GABAPENTIN 500 MG: 100 CAPSULE ORAL at 08:09

## 2020-08-19 ASSESSMENT — ENCOUNTER SYMPTOMS
MYALGIAS: 1
SPEECH CHANGE: 0
FEVER: 0
BACK PAIN: 0
TINGLING: 0
ABDOMINAL PAIN: 0
DIZZINESS: 0
PHOTOPHOBIA: 0
SHORTNESS OF BREATH: 0
HALLUCINATIONS: 0
DOUBLE VISION: 0
HEADACHES: 0
SORE THROAT: 0
FLANK PAIN: 0
BLOOD IN STOOL: 0
FOCAL WEAKNESS: 0
TREMORS: 0
FALLS: 0
SEIZURES: 0
NAUSEA: 0
LOSS OF CONSCIOUSNESS: 0
DEPRESSION: 0
WHEEZING: 0
PND: 0
CHILLS: 0
BLURRED VISION: 0
WEAKNESS: 1
DIARRHEA: 0
SENSORY CHANGE: 0
NERVOUS/ANXIOUS: 1
SPUTUM PRODUCTION: 0

## 2020-08-19 NOTE — THERAPY
Occupational Therapy     Patient Name: Francesco Schulte  Age:  70 y.o., Sex:  male  Medical Record #: [id number]  Today's Date: 8/19/2020    Discussed missed therapy with RN       08/19/20 1500   Interdisciplinary Plan of Care Collaboration   Collaboration Comments Per nursing, pt has been getting up in room and ambulating in the [place] with FWW with nursing when he wants to.  He is not able to tolerate LB dressing, showering, sitting on the toilet at this time very well due to location of wounds.  At this time, pt not appropriate for skilled OT until his wound is better healed and he can do more self care activities.  Will discontinue OT at this time and montior for d/c needs.  Pt will need prolonged wound care at SNF level of care, and when he is better able to tolerate self care activities, will benefit from continued OT.

## 2020-08-19 NOTE — PROGRESS NOTES
"Hospital Medicine Daily Progress Note    Date of Service  8/19/2020    Chief Complaint  Scrotal pain and swelling    Hospital Course    Mr. Schulte is a 70-year-old male with PMH significant for CAD, chronic systolic CHF, PAF on no rate control medication chronically anticoagulated with apixaban, right CEA, and DM 2 who presented 7/25/2020 with scrotal pain and swelling.  He was found to have right scrotal abscess and Landon's gangrene.  Urology was emergently consulted and patient underwent washout on 7/25 with 3 more subsequent washouts on 7/29, 7/31, and 8/3 with wound VAC placement.      Interval Problem Update  8/19 - BM today  -sleeping during rounds. Arouses easily. We discussed DC plan and need to wean off IV MSO4 during dressing changes. He reports he was [facility] and [facility]. Discussed with CM, this is not the case. He has asked me to come up with a PO pain plan and discuss with him tomorrow. Tearful when discussing - he reports the \"pain is excruciating\" during dressing changes.     8/18 - Still no BM.  Last BM 8/14  -Up ambulating in the [place] with CNA this morning  -Plan for meeting with family and palliative care at 1500 today    Consultants/Specialty  Palliative care    Code Status  Full Code    Disposition  TBD -Per recent CM note, SNF referral will be updated and resubmitted once the patient no longer requires IV pain meds for the wound dressing changes    Review of Systems  Review of Systems   Constitutional: Negative for chills and fever.        Comfortable   HENT: Negative for hearing loss, sore throat and tinnitus.    Eyes: Negative for blurred vision, double vision and photophobia.   Respiratory: Negative for sputum production, shortness of breath and wheezing.    Cardiovascular: Negative for chest pain, leg swelling and PND.   Gastrointestinal: Negative for abdominal pain, blood in stool, diarrhea and nausea.   Genitourinary: Negative for dysuria, flank pain, " frequency, hematuria and urgency.        Right testicular pain   Musculoskeletal: Positive for myalgias (Stable). Negative for back pain, falls and joint pain.   Neurological: Positive for weakness (Improving). Negative for dizziness, tingling, tremors, sensory change, speech change, focal weakness, seizures, loss of consciousness and headaches.   Psychiatric/Behavioral: Negative for depression and hallucinations. The patient is nervous/anxious.    All other systems reviewed and are negative.       Physical Exam  Temp:  [36.7 °C (98.1 °F)-36.9 °C (98.5 °F)] 36.9 °C (98.5 °F)  Pulse:  [70-82] 82  Resp:  [16-18] 18  BP: (108-123)/(56-85) 123/56  SpO2:  [94 %-100 %] 100 %    Physical Exam  Vitals signs and nursing note reviewed.   Constitutional:       General: He is sleeping. He is not in acute distress.     Appearance: Normal appearance. He is not ill-appearing or diaphoretic.      Comments: Sitting up in bed on phone - falling asleep.    HENT:      Head: Normocephalic and atraumatic.      Nose: Nose normal.      Mouth/Throat:      Mouth: Mucous membranes are dry.   Eyes:      General: No scleral icterus.        Right eye: No discharge.         Left eye: No discharge (.recent).      Extraocular Movements: Extraocular movements intact.      Conjunctiva/sclera: Conjunctivae normal.      Pupils: Pupils are equal, round, and reactive to light.   Neck:      Musculoskeletal: Normal range of motion and neck supple.   Cardiovascular:      Rate and Rhythm: Normal rate and regular rhythm.      Heart sounds: No murmur. No gallop.       Comments: E Psychiatric WNL  Pulmonary:      Effort: Pulmonary effort is normal. No respiratory distress.      Breath sounds: Normal breath sounds. No stridor.   Abdominal:      General: Abdomen is flat. Bowel sounds are normal. There is no distension.      Palpations: Abdomen is soft.      Tenderness: There is no abdominal tenderness.   Genitourinary:     Comments: Andres   Musculoskeletal:          General: No swelling, tenderness or deformity.   Skin:     General: Skin is warm and dry.      Coloration: Skin is pale.      Comments: Wound vac testicle/perinum intact, functioning appropriately.    Neurological:      General: No focal deficit present.      Mental Status: He is oriented to person, place, and time and easily aroused. Mental status is at baseline.      Cranial Nerves: No cranial nerve deficit.      Motor: Motor function is intact.      Coordination: Coordination normal.   Psychiatric:         Mood and Affect: Mood normal.         Behavior: Behavior is cooperative.         Thought Content: Thought content normal.         Cognition and Memory: Cognition normal.         Fluids    Intake/Output Summary (Last 24 hours) at 8/19/2020 1551  Last data filed at 8/19/2020 1300  Gross per 24 hour   Intake 1080 ml   Output 2000 ml   Net -920 ml       Laboratory  Recent Labs     08/17/20  0402 08/18/20  0430 08/18/20  1857   WBC 7.7 8.1 9.3   RBC 4.65* 4.72 4.83   HEMOGLOBIN 11.5* 11.8* 12.0*   HEMATOCRIT 38.4* 39.0* 39.9*   MCV 82.6 82.6 82.6   MCH 24.7* 25.0* 24.8*   MCHC 29.9* 30.3* 30.1*   RDW 54.6* 54.5* 53.8*   PLATELETCT 942* 961* 979*   MPV 10.9 10.6 10.2     Recent Labs     08/17/20  0402 08/18/20  0430   SODIUM 138 140   POTASSIUM 3.5* 3.8   CHLORIDE 104 105   CO2 24 25   GLUCOSE 167* 157*   BUN 11 10   CREATININE 0.91 0.82   CALCIUM 8.7 8.7                   Imaging  EC-ECHOCARDIOGRAM COMPLETE W/ CONT   Final Result      EC-ECHOCARDIOGRAM COMPLETE W/O CONT   Final Result      IR-MIDLINE CATHETER INSERTION WO GUIDANCE > AGE 3   Final Result                  Ultrasound-guided midline placement performed by qualified nursing staff    as above.          DX-CHEST-PORTABLE (1 VIEW)   Final Result         1. No acute cardiopulmonary abnormalities are identified.      CT-ABDOMEN-PELVIS WITH   Final Result      1.  Diffuse scrotal edema, eccentric to the RIGHT with extensive soft tissue gas present particularly  in the RIGHT groin and hemiscrotum, consistent with Landon's gangrene.   2.  No discrete drainable abscess demonstrated.   3.  Minimal retroperitoneal adenopathy, nonspecific.   4.  Nonobstructing LEFT kidney stone.   5.  Splenomegaly.   6.  Cystic lesion in the pancreatic tail measuring 19 mm.  Neoplasm is not excluded.  Consider further evaluation with MRI.      These findings were discussed with AYALA HAMPTON on 7/25/2020 3:41 PM.      DX-CHEST-PORTABLE (1 VIEW)   Final Result      1.  There is no acute cardiopulmonary process.           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  -ongoing pain requiring pre-medication with IV Morphine and Ativan prior to wound VAC dressing changes  -Increased gabapentin 8/18  -Continue MS Contin  -Gabapentin 400 TID, MS contin 15 BID, PRN oxy 5-10 q4h PRN  -IV morphine PRN for dressing changes only - barrier to discharge  -Completed two weeks of unasyn and fluconazole (8/17/20)  -Limit immunosuppression as much as possible to facilitate healing  -Tight glucose control, goal is less than 150  -wound vac/wound care  -SNF when able to wean off IV narcotics      Stenosis of right carotid artery-Right CEA 3/21/18- (present on admission)  Assessment & Plan  -continue ASA and Eliquis  -not on statin due to allergy      CAD (coronary artery disease)- (present on admission)  Assessment & Plan  -chronic  -denies chest pain  -continue Eliquis  -allergy to statins      Type 2 diabetes mellitus, with long-term current use of insulin (HCC)- (present on admission)  Assessment & Plan  -he has been refusing Lantus (last dose 8/7) so I have DC'd this today  -chemsticks 130-162 past 24 hours - I've adjusted Lispro sliding scale today  -continue Diabetic diet  -hypoglycemia protocol  -goal blood sugar <150 for optimal wound healing      Chronic  systolic CHF (congestive heart failure) (HCC)- (present on admission)  Assessment & Plan  -without acute exacerbation  -ECHO this hospitalization unchanged from previous - EF 30%  -OP FU with Cardiology      Paroxysmal A-fib ([facility])- (present on admission)  Assessment & Plan  -Rate controlled without medication  -anticoagulated with Eliquis    Polycythemia- (present on admission)  Assessment & Plan  -has required phlebotomy in the past  -history of MI and TIA when platelets were greater than 1 million in the past  -stable  -Hematology following  -continue Hydrea  -Trend CBC with differential daily to monitor for leukopenia        Advanced care planning/counseling discussion  Assessment & Plan  -Palliative care have scheduled GOC meeting today at 1500    Constipation  Assessment & Plan  -BM today (8/19)  -continue bowel protocol           Age-related physical debility  Assessment & Plan  -Multiple comorbidities  -Now with Landon's gangrene  -Body mass index is 26.13 kg/m².  -PT/OT  -Encourage p.o. intake  -3 times daily supplements  -Fall precautions  -Sleep hygiene  -Palliative care conference yesterday. Patient remains FULL CODE.    Pancreatic cyst  Assessment & Plan  -OP FU      SLE (systemic lupus erythematosus related syndrome) ([facility])- (present on admission)  Assessment & Plan  -holding prednisone in the setting of infection  -no s/s adrenal insufficiency  -has not had steroid since 7/31/2020         VTE prophylaxis: Eliquis and ambulation    I have performed a physical exam and reviewed and updated ROS and Assessment/Plan today (08/19/20). In review of the previous note there are no changes except as documented above    Please note that this dictation was created using voice recognition software. I have made every reasonable attempt to correct obvious errors, but there may be errors of grammar and possibly content that I did not discover before finalizing the note.    CONNER Roberts.

## 2020-08-19 NOTE — PROGRESS NOTES
Received report from Britton JORGENSEN at 1910. Pt is resting in bed. Pt is AOx4. Dressing to scrotum is clean dry and intact. Wound vac in place. Fall percautions in place. Family is at bedside at this time. Call light and bellongings are within reach. Will continue to monitor.

## 2020-08-19 NOTE — CONSULTS
DATE OF SERVICE:  08/18/2020    PRIMARY TEAM REQUESTING CONSULTATION:  Dipika Cantu DO    HISTORY OF PRESENT ILLNESS:  This is a very nice 70-year-old gentleman, who I   follow as an outpatient.  He was originally seen by Dr. Becka Flores in   my group in 03/2017.  He was admitted back then on multiple occasions for   thrombosed LAD stents and cardiac MI.  He was followed by [facility].  He   apparently moved to [place] and reestablished care in the office in 04/2017.  He   was on Hydrea 1000 mg a day and as needed phlebotomy.    He has had a strong history of atrial fibrillation, diabetes, and   hyperlipidemia.  His polycythemia vera/ET was originally diagnosed back in   2008 when he presented with a myocardial infarction.  It was reported this   happened when his counts were over a million to 1.1 million.  He did know at   that time that he was told he had a JAK2 mutation.  However, we could never   get records from [facility].  He has been off of Hydrea for some time in 2017.  We   eventually got him back on Hydrea and he had been stable on 1000 a day.  He   has not suffered any other additional events since then.  His last blood count   in our office was on 07/20/2020.  His white count was 8.5, hemoglobin 14,   hematocrit 44.  His platelets were actually mild thrombocytopenia at 137.  We   had decreased his Hydrea from 1000 a day to 1000 alternating with 500 because   of the thrombocytopenia.    He tells me his most recent history is that he noticed a little pimple on his   testicle prior to this admission.  He states that it was just a small little   pimple.  He went to dermatology.  HE SAID HE WAS ON ANTIBIOTICS OF   DOXYCYCLINE, WHICH HE HAD ALLERGIC REACTION TO WITH SWELLING OF HIS FACE.  He   came to the emergency room and was placed on steroids.  He has already been on   40 mg of prednisone, he tells me because that for his lupus, followed by Dr. Renner.  He was also on Imuran.  The patient tells me then  soon after that he   just started developing more changes in that scrotum.  He eventually became   very, very painful.  He came to the hospital on 07/25/2020 to be evaluated.    He had extensive scrotal pain and swelling.  He had malaise.  Rest of his   review of systems were negative except for his chronic issues.  He was found   to have what they felt was Landon's gangrene.  He has been sent to the   operating room with Dr. Rosales.  Has completed antibiotics.  Infectious disease   had been seen him.  He is also going through wound care.  We are asked to see   him because his platelets have trended back up.  He was off Hydrea for some   time, but more recently last week restarted and then increased to 2000 mg   yesterday and his first dose was at 9:00 p.m. of 2000 mg of Hydrea last night.    PAST MEDICAL HISTORY:  As above, but also the diabetes, MI history, ischemic   cardiomyopathy, ejection fraction 30% reported by patient with his last echo,   kidney stone history, polycythemia vera/ET.    PAST SURGICAL HISTORY:  As above.  He also had a laminectomy, appendectomy,   carotid endarterectomy, and temporal artery biopsy in 2018.    FAMILY HISTORY:  No other significant family history.  He has a daughter that   is very involved.    SOCIAL HISTORY:  Nonsmoker, nondrinker.    ALLERGIES:  TO DOXYCYCLINE, METFORMIN, SIMVASTATIN.    PHYSICAL EXAMINATION:  GENERAL:  He is alert and oriented.  He seems like he has lost quite a bit of   weight, appearing chronically ill.  LUNGS:  Clear bilaterally.  CARDIOVASCULAR:  Regular rate and rhythm.  Normal S1 and S2.  ABDOMEN:  Soft, nontender.  EXTREMITIES:  No clubbing, cyanosis, or edema.  NEUROLOGIC:  Otherwise, intact.    DIAGNOSTIC DATA:  He has got reviewed all his imaging for his scrotal wound.    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  He has required  phlebotomies for his polycythemia vera.    However, he has been otherwise stable with Hydrea.  In review of his   laboratories here in the hospital, his platelets have been trending up this   week.  Looking back on admission, his platelet count was 255.  It has steadily   gone up each week since then getting to about 710 on 08/02.  Then, he did go   back down and then he was up to 840 on 08/13 and then 961 today.  His   hemoglobin is holding at 11.8, white count is okay with neutrophil count of   over 6000.  He does have a good kidney function at 0.82.  He continues on his   anticoagulation with Eliquis and aspirin.  He has no bleeding or bruising.    His COVID testing on 07/25 was negative.    I told him in the sense of his myeloproliferative, I think it is   multifactorial at this point.  I think there is an underlying bone marrow   disorder obviously where we have shown that his platelets can get high.  He   also has an overlapping polycythemia vera, which at this current time, he is a   little bit more anemic than he typically is.  However, I think it is all   together.  He does have splenomegaly on CT.  In the past, we discussed on   multiple occasions of different treatment options.  He has done well on   Hydrea, so we have just kept him on it.  We have had discussions in the past   about other agents, which he is not a candidate for at this time since   interferon, etc.  We also talked about JAK5.  We talked about that in patients   that are intolerant of phlebotomy and/or progress on phlebotomy or need   additional help because of progression of post polycythemia vera with some   thrombocytosis.  So, he just did not like some of the side effects of the   medication.  He understands that is still a consideration if we do not get   good control of his disease with Hydrea.    I also told him I think some of his platelet issue could be multifactorial   with strongly inflammatory component as well.  I would also  recommend checking   some iron studies.  I think going up to possibly 2500 mg a day since he is   tolerating it and he does not have any rash, which he did develop in the past.    I think we could hopefully, over the next 2-3 days, see a trend down.  He   understands that each Hydrea dose can take a day or two to kick in.    We also discussed if his platelet count does get over a million, will need to   obviously watch closely to make sure there is no acquired Von Willebrand's.  I   do not think he needs any obvious emergent platelet pheresis.  He has not   otherwise asymptomatic.  We discussed all these other options.    He is having a family conference with his family today to talk about his code   status.  He understands that his prognosis is extremely guarded, but he seems   to be in relatively decent spirits.  He understands my partner, Dr. Lopez   will be taking over the service.  She will reviewing his labs, hydrea adjustment, and   Rounding.  I have updated her.  He was appreciative of my visit in consultation.  He wants to stick with Hydrea at this time.       ____________________________________     Deisir MD EDISON Jim / DEANNA    DD:  08/18/2020 22:11:34  DT:  08/18/2020 22:57:23    D#:  [id number]  Job#:  [id number]

## 2020-08-19 NOTE — CARE PLAN
Problem: Communication  Goal: The ability to communicate needs accurately and effectively will improve  Outcome: PROGRESSING AS EXPECTED  Note: Pt is able to communicate needs appropriately. Pt educated to use all light when in need of assistance. Will continue to monitor.     Problem: Safety  Goal: Will remain free from falls  Outcome: PROGRESSING AS EXPECTED  Note: Pt has not fallen during this hospital admission. Pt educated to call when in need of assistance. Call light and belongings are within reach. Will continue to monitor.

## 2020-08-20 PROBLEM — G45.9 TIA (TRANSIENT ISCHEMIC ATTACK): Status: ACTIVE | Noted: 2020-08-20

## 2020-08-20 LAB
BASOPHILS # BLD AUTO: 1.6 % (ref 0–1.8)
BASOPHILS # BLD: 0.12 K/UL (ref 0–0.12)
COMMENT 1642: NORMAL
CRP SERPL HS-MCNC: 0.47 MG/DL (ref 0–0.75)
EOSINOPHIL # BLD AUTO: 0.26 K/UL (ref 0–0.51)
EOSINOPHIL NFR BLD: 3.6 % (ref 0–6.9)
ERYTHROCYTE [DISTWIDTH] IN BLOOD BY AUTOMATED COUNT: 55.1 FL (ref 35.9–50)
ERYTHROCYTE [SEDIMENTATION RATE] IN BLOOD BY WESTERGREN METHOD: 36 MM/HOUR (ref 0–20)
GLUCOSE BLD-MCNC: 124 MG/DL (ref 65–99)
GLUCOSE BLD-MCNC: 133 MG/DL (ref 65–99)
GLUCOSE BLD-MCNC: 167 MG/DL (ref 65–99)
GLUCOSE BLD-MCNC: 226 MG/DL (ref 65–99)
HCT VFR BLD AUTO: 39.4 % (ref 42–52)
HGB BLD-MCNC: 11.7 G/DL (ref 14–18)
HYPOCHROMIA BLD QL SMEAR: ABNORMAL
IMM GRANULOCYTES # BLD AUTO: 0.08 K/UL (ref 0–0.11)
IMM GRANULOCYTES NFR BLD AUTO: 1.1 % (ref 0–0.9)
LG PLATELETS BLD QL SMEAR: NORMAL
LYMPHOCYTES # BLD AUTO: 0.86 K/UL (ref 1–4.8)
LYMPHOCYTES NFR BLD: 11.8 % (ref 22–41)
MCH RBC QN AUTO: 24.7 PG (ref 27–33)
MCHC RBC AUTO-ENTMCNC: 29.7 G/DL (ref 33.7–35.3)
MCV RBC AUTO: 83.1 FL (ref 81.4–97.8)
MONOCYTES # BLD AUTO: 0.4 K/UL (ref 0–0.85)
MONOCYTES NFR BLD AUTO: 5.5 % (ref 0–13.4)
NEUTROPHILS # BLD AUTO: 5.56 K/UL (ref 1.82–7.42)
NEUTROPHILS NFR BLD: 76.4 % (ref 44–72)
NRBC # BLD AUTO: 0.02 K/UL
NRBC BLD-RTO: 0.3 /100 WBC
PLATELET # BLD AUTO: 932 K/UL (ref 164–446)
PLATELET BLD QL SMEAR: NORMAL
PMV BLD AUTO: 10.4 FL (ref 9–12.9)
POLYCHROMASIA BLD QL SMEAR: NORMAL
RBC # BLD AUTO: 4.74 M/UL (ref 4.7–6.1)
RBC BLD AUTO: PRESENT
WBC # BLD AUTO: 7.3 K/UL (ref 4.8–10.8)

## 2020-08-20 PROCEDURE — 700102 HCHG RX REV CODE 250 W/ 637 OVERRIDE(OP): Performed by: INTERNAL MEDICINE

## 2020-08-20 PROCEDURE — 770006 HCHG ROOM/CARE - MED/SURG/GYN SEMI*

## 2020-08-20 PROCEDURE — 97530 THERAPEUTIC ACTIVITIES: CPT

## 2020-08-20 PROCEDURE — A9270 NON-COVERED ITEM OR SERVICE: HCPCS | Performed by: INTERNAL MEDICINE

## 2020-08-20 PROCEDURE — 99232 SBSQ HOSP IP/OBS MODERATE 35: CPT | Performed by: HOSPITALIST

## 2020-08-20 PROCEDURE — 700111 HCHG RX REV CODE 636 W/ 250 OVERRIDE (IP): Performed by: HOSPITALIST

## 2020-08-20 PROCEDURE — 700102 HCHG RX REV CODE 250 W/ 637 OVERRIDE(OP): Performed by: HOSPITALIST

## 2020-08-20 PROCEDURE — A9270 NON-COVERED ITEM OR SERVICE: HCPCS | Performed by: HOSPITALIST

## 2020-08-20 PROCEDURE — 36415 COLL VENOUS BLD VENIPUNCTURE: CPT

## 2020-08-20 PROCEDURE — 86140 C-REACTIVE PROTEIN: CPT

## 2020-08-20 PROCEDURE — 85025 COMPLETE CBC W/AUTO DIFF WBC: CPT

## 2020-08-20 PROCEDURE — 82962 GLUCOSE BLOOD TEST: CPT

## 2020-08-20 PROCEDURE — 85652 RBC SED RATE AUTOMATED: CPT

## 2020-08-20 PROCEDURE — 97116 GAIT TRAINING THERAPY: CPT

## 2020-08-20 RX ORDER — ALPRAZOLAM 0.5 MG/1
TABLET ORAL
Status: DISCONTINUED
Start: 2020-08-20 | End: 2020-08-20

## 2020-08-20 RX ADMIN — GABAPENTIN 500 MG: 100 CAPSULE ORAL at 09:20

## 2020-08-20 RX ADMIN — SENNOSIDES-DOCUSATE SODIUM TAB 8.6-50 MG 2 TABLET: 8.6-5 TAB at 09:20

## 2020-08-20 RX ADMIN — ASPIRIN 81 MG: 81 TABLET, COATED ORAL at 09:20

## 2020-08-20 RX ADMIN — SENNOSIDES-DOCUSATE SODIUM TAB 8.6-50 MG 2 TABLET: 8.6-5 TAB at 20:21

## 2020-08-20 RX ADMIN — THERA TABS 1 TABLET: TAB at 09:20

## 2020-08-20 RX ADMIN — OXYCODONE HYDROCHLORIDE 10 MG: 10 TABLET ORAL at 17:28

## 2020-08-20 RX ADMIN — GABAPENTIN 500 MG: 100 CAPSULE ORAL at 13:03

## 2020-08-20 RX ADMIN — AZATHIOPRINE 50 MG: 50 TABLET ORAL at 20:24

## 2020-08-20 RX ADMIN — GABAPENTIN 500 MG: 100 CAPSULE ORAL at 20:22

## 2020-08-20 RX ADMIN — AZATHIOPRINE 50 MG: 50 TABLET ORAL at 09:33

## 2020-08-20 RX ADMIN — OXYCODONE HYDROCHLORIDE 10 MG: 10 TABLET ORAL at 13:03

## 2020-08-20 RX ADMIN — HYDROXYUREA 2500 MG: 500 CAPSULE ORAL at 20:24

## 2020-08-20 RX ADMIN — OMEPRAZOLE 20 MG: 20 CAPSULE, DELAYED RELEASE ORAL at 09:20

## 2020-08-20 RX ADMIN — MORPHINE SULFATE 15 MG: 15 TABLET, EXTENDED RELEASE ORAL at 20:22

## 2020-08-20 RX ADMIN — APIXABAN 5 MG: 5 TABLET, FILM COATED ORAL at 20:22

## 2020-08-20 RX ADMIN — APIXABAN 5 MG: 5 TABLET, FILM COATED ORAL at 09:20

## 2020-08-20 RX ADMIN — MORPHINE SULFATE 15 MG: 15 TABLET, EXTENDED RELEASE ORAL at 09:20

## 2020-08-20 ASSESSMENT — GAIT ASSESSMENTS
GAIT LEVEL OF ASSIST: SUPERVISED
DISTANCE (FEET): 350
DEVIATION: OTHER (COMMENT)
ASSISTIVE DEVICE: FRONT WHEEL WALKER

## 2020-08-20 ASSESSMENT — COGNITIVE AND FUNCTIONAL STATUS - GENERAL
CLIMB 3 TO 5 STEPS WITH RAILING: A LITTLE
MOBILITY SCORE: 23
SUGGESTED CMS G CODE MODIFIER MOBILITY: CI

## 2020-08-20 ASSESSMENT — ENCOUNTER SYMPTOMS
MYALGIAS: 1
WEAKNESS: 1
SPEECH CHANGE: 0
BLURRED VISION: 0
HEADACHES: 0
SENSORY CHANGE: 0
BACK PAIN: 0
SPUTUM PRODUCTION: 0
CHILLS: 0
FALLS: 0
BLOOD IN STOOL: 0
WHEEZING: 0
PHOTOPHOBIA: 0
ABDOMINAL PAIN: 0
DEPRESSION: 0
HALLUCINATIONS: 0
SEIZURES: 0
FEVER: 0
SORE THROAT: 0
DOUBLE VISION: 0
FOCAL WEAKNESS: 0
SHORTNESS OF BREATH: 0
DIARRHEA: 0
FLANK PAIN: 0
PND: 0
DIZZINESS: 0
TINGLING: 0
TREMORS: 0
NERVOUS/ANXIOUS: 1
LOSS OF CONSCIOUSNESS: 0
NAUSEA: 0

## 2020-08-20 NOTE — PROGRESS NOTES
Received report from noc shift nurse. Assumed pt care at 0715. Pt is A&Ox4, resting comfortably in bed. Pt on r.a. No signs of SOB/respiratory distress. Labs noted, VSS. Pt c/o R scrotal pain 3/10 at this moment, pt states this is an acceptable pain level. Will return for morning meds and assessment. Fall precautions in place. Bed at lowest position. Call light and personal belongings within reach. Continue to monitor

## 2020-08-20 NOTE — PROGRESS NOTES
Woke pt at 0128 per pt request to reassess pain and need for pain medication. Pt stated pain is 4/10 and wanted to rest, and did not want any pain medication at this time. Pt is currently resting in bed comfortably. Will continue to monitor.

## 2020-08-20 NOTE — PROGRESS NOTES
Received report from Rae JORGENSEN at 1903. Pt is resting comfortably in bed eating dinner. Pt is awake and alert. Family is currently at bedside. Pt shows no signs of distress. Dressing to scrotum is clean dry and intact, wound vac is in place. Will continue to monitor.

## 2020-08-20 NOTE — CARE PLAN
Problem: Venous Thromboembolism (VTW)/Deep Vein Thrombosis (DVT) Prevention:  Goal: Patient will participate in Venous Thrombosis (VTE)/Deep Vein Thrombosis (DVT)Prevention Measures  Outcome: PROGRESSING AS EXPECTED  Flowsheets (Taken 8/19/2020 2135)  Mechanical Prophylaxis: SCDs, Sequential Compression Device  Note: Pt is compliant with wearing SCD's. Pt's SCD's are on.     Problem: Bowel/Gastric:  Goal: Normal bowel function is maintained or improved  Outcome: PROGRESSING AS EXPECTED  Note: Pt reports having a bowel movement today that was formed. Will continue to monitor.

## 2020-08-20 NOTE — WOUND TEAM
[facility]  Inpatient Services   Wound and Skin Care Progress Note    Admission Date: 7/25/2020     Last order of IP CONSULT TO WOUND CARE was found on 7/30/2020 from Hospital Encounter on 7/25/2020     HPI, PMH, SH: Reviewed    Unit where seen by Wound Team: 2209/01     WOUND CONSULT/FOLLOW UP RELATED TO:  NPWT change to right scrotum     Self Report / Pain Level:  IV morphine, Ativan, Topical Lidocaine. States it is much better than last time, pain mostly controlled.        OBJECTIVE:  Patient on pressure redistribution mattress with waffle overlay. NPWT intact.     WOUND TYPE, LOCATION, CHARACTERISTICS (Pressure Injuries: location, stage, POA or date identified)  Wound 07/25/20 Full Thickness Wound Scrotum Right (Active)   Wound Image      Site Assessment Pink;Red;Granulation tissue    Periwound Assessment Intact;Induration    Margins Attached edges    Closure Secondary intention    Drainage Amount Small    Drainage Description Serosanguineous    Treatments Cleansed;Site care;Tape change;Topical Lidocaine    Wound Cleansing Normal Saline Irrigation    Periwound Protectant Benzoin;Paste Ring    Dressing Cleansing/Solutions Normal Saline    Dressing Options Wound Vac    Dressing Changed Changed    Dressing Status Clean;Dry;Intact    Dressing Change/Treatment Frequency Monday, Wednesday, Friday, and As Needed    NEXT Dressing Change/Treatment Date 08/21/20    NEXT Weekly Photo (Inpatient Only) 08/24/20    Non-staged Wound Description Full thickness    Number of days: 25        Negative Pressure Wound Therapy 07/27/20 Surgical (Active)   NPWT Pump Mode / Pressure Setting Ulta;125 mmHg    Dressing Type Medium;Black Foam (Veraflo)    Number of Foam Pieces Used 1    Canister Changed No    Output (mL) 100 mL    NEXT Dressing Change/Treatment Date 08/21/20    VAC VeraFlo Irrigant Normal Saline    VAC VeraFlo Soak Time (mins) 4    VAC VeraFlo Instill Volume (ml) 16    VAC VeraFlo - Therapy Time (hrs) 2.5     VAC VeraFlo Pressure (mm/Hg) 125 mmHg;Continuous    WOUND NURSE ONLY - Time Spent with Patient (mins) 75             Vascular:    JW:   No results found.    Lab Values:    Lab Results   Component Value Date/Time    WBC 9.3 08/18/2020 06:57 PM    RBC 4.83 08/18/2020 06:57 PM    HEMOGLOBIN 12.0 (L) 08/18/2020 06:57 PM    HEMATOCRIT 39.9 (L) 08/18/2020 06:57 PM    CREACTPROT 0.58 07/01/2020 07:03 AM    SEDRATEWES 15 07/01/2020 07:03 AM    HBA1C 8.6 (A) 01/07/2020 07:14 AM        Culture Results show:  Recent Results (from the past 720 hour(s))   CULTURE WOUND W/ GRAM STAIN    Collection Time: 07/31/20  6:45 PM    Specimen: Wound   Result Value Ref Range    Significant Indicator POS (POS)     Source WND     Site SCROTAL ABSCESS     Culture Result (A)      Growth noted after further incubation, see below for  organism identification.      Gram Stain Result       Many WBCs.  Rare Gram positive cocci.  Rare Yeast.      Culture Result Candida albicans  Rare growth   (A)        INTERVENTIONS BY WOUND TEAM:  Patient and chart reviewed. In with wound RN Paola, patient pre-medicated. Soaked dressing with NS and lidocaine. Remove drape and paste ring with adhesive remover spray. Started to remove foam with lidocaine and NS being drizzle, tolerable pain, no retained foam. Cleansed wound and periwound with NS and gauze, dried. No sting and paste ring applied to periwound. Placed mepitel over testicle. One piece of VF black foam was placed into wound bed, secured with drape. Hole cut and trak pad applied. Suction obtained at 125mmhg, test run of NS at 16ml, 4min soak, every 2.5h. changed linens. Patient repositioned. Updated bedside RN Rae    Interdisciplinary consultation: Patient, Bedside RN (Rae), wound RN Paola    EVALUATION: Patient's wound continues to improve, less induration and more tolerable pain. Right scrotum will continue to benefit from NPWT VF to help increase granulation and oxygenation to the area, manage drainage,  and close by secondary intention. Wound team to follow up 3x/week for dressing changes.     Goals: Steady decrease in wound area and depth weekly.    NURSING PLAN OF CARE ORDERS (X):    Dressing changes: See Dressing Care orders: X  Skin care: See Skin Care orders: X  Rectal tube care: See Rectal Tube Care orders:   Other orders:    WOUND TEAM PLAN OF CARE:   Dressing changes by wound team:                   Follow up 3 times weekly:                NPWT change 3 times weekly:   X  Follow up 1-2 times weekly:      Follow up Bi-Monthly:                   Follow up as needed:       Other (explain):     Anticipated discharge plans: Will need VAC and dressing changes 3x/week  LTACH:        SNF/Rehab:                  Home Health Care:           Outpatient Wound Center:            Self Care:

## 2020-08-20 NOTE — THERAPY
Physical Therapy   Daily Treatment     Patient Name: Francesco Schulte  Age:  70 y.o., Sex:  male  Medical Record #: [id number]  Today's Date: 8/20/2020     Precautions: (P) Fall Risk    Assessment    Pt is progressing very well with functional mobility and has been able to demonstrate with improved balance, standing tolerance, pain tolerance, and increased ambulation distance. Pt was intially limited by pain and poor activity tolerance, however, this has significantly improved and pt is mobilizing with SPV level of assist at this time. Encouraged pt to continue mobilizing with [facility] staff at least 3x/day. Pt is in no acute skilled PT needs at this time. Anticipate pt to d/c home once medically clear with recs for HH therapy services. Pt has met all PT goals while in house. Pt may need to go for placement for medical reasons and possible wound care management. Pt will not be actively followed and will be on d/c needs only for questions and equipment needs.     Plan    D/C needs only     DC Equipment Recommendations: (P) Front-Wheel Walker  Discharge Recommendations: (P) Recommend home health for continued physical therapy services    Objective       08/20/20 1245   Gait Analysis   Gait Level Of Assist Supervised   Assistive Device Front Wheel Walker   Distance (Feet) 350   # of Times Distance was Traveled 1   Deviation Other (Comment)  (dec marnie and slightly antalgic )   Weight Bearing Status fwb   Skilled Intervention Verbal Cuing   Bed Mobility    Supine to Sit Supervised   Sit to Supine Supervised   Scooting Supervised   Rolling Supervised   Functional Mobility   Sit to Stand Supervised   Bed, Chair, Wheelchair Transfer Supervised   Skilled Intervention Verbal Cuing   Patient / Family Goals    Patient / Family Goal #1 to go back to PLOF   Short Term Goals    Short Term Goal # 1 pt will go supine<>sit w/hob flat and rails down w/spv in 6tx for safe d/c home   Goal Outcome # 1 Goal met   Short Term Goal # 2 pt  will go sit<>stand w/fww w/spv in 6tx for safe d/c home   Goal Outcome # 2 Goal met   Short Term Goal # 3 pt will transfer bed<>chair w/fww w/spv in 6tx for safe d/c home    Goal Outcome # 3 Goal met   Short Term Goal # 4 pt will ambulate for 150ft w/fww w/spv in 6tx for safe d/c home    Goal Outcome # 4 Goal met

## 2020-08-20 NOTE — PROGRESS NOTES
"Hospital Medicine Daily Progress Note    Date of Service  8/20/2020    Chief Complaint  Scrotal pain and swelling    Hospital Course    Mr. Schulte is a 70-year-old male with PMH significant for CAD with Mix 3 and in-stent restenosis, TIA's chronic systolic CHF, PAF on no rate control medication chronically anticoagulated with apixaban, right CEA, and DM 2 who presented 7/25/2020 with scrotal pain and swelling.  He was found to have right scrotal abscess and Landon's gangrene.  Urology was emergently consulted and patient underwent washout on 7/25 with 3 more subsequent washouts on 7/29, 7/31, and 8/3 with wound VAC placement. He had acute on chronic flare of his polycythemia. Hematology was consulted and increased his Hydrea on 8/18. Platelets have been trending down.       Interval Problem Update  8/20 - agreeable to try IR PO pain meds prior to dressing change tomorrow but wants IV meds available \"just in case\". We discussed the dangers of potentially over-medicating patient with narcotics. We will re-assess this tomorrow.  -he has asked me to call his \"medical daughter\" Charla. I called and spoke with her for 20 minutes - updated on plan of care, questions answered.     8/19 - BM today  -sleeping during rounds. Arouses easily. We discussed DC plan and need to wean off IV MSO4 during dressing changes. He reports he was [facility] and [facility] MSO4. Discussed with CM, this is not the case. He has asked me to come up with a PO pain plan and discuss with him tomorrow. Tearful when discussing - he reports the \"pain is excruciating\" during dressing changes.   8/18 - Still no BM.  Last BM 8/14  -[place] this morning  -Plan for meeting with family and palliative care at 1500 today    Consultants/Specialty  Palliative care  Hematology    Code Status  Full Code    Disposition  TBD -Per recent CM note, SNF referral will be updated and resubmitted once the patient no " longer requires IV pain meds for the wound dressing changes    Review of Systems  Review of Systems   Constitutional: Negative for chills and fever.        Comfortable   HENT: Negative for hearing loss, sore throat and tinnitus.    Eyes: Negative for blurred vision, double vision and photophobia.   Respiratory: Negative for sputum production, shortness of breath and wheezing.    Cardiovascular: Negative for chest pain, leg swelling and PND.   Gastrointestinal: Negative for abdominal pain, blood in stool, diarrhea and nausea.   Genitourinary: Negative for dysuria, flank pain, frequency, hematuria and urgency.        Right testicular pain   Musculoskeletal: Positive for myalgias (Stable). Negative for back pain, falls and joint pain.   Neurological: Positive for weakness (Improving). Negative for dizziness, tingling, tremors, sensory change, speech change, focal weakness, seizures, loss of consciousness and headaches.   Psychiatric/Behavioral: Negative for depression and hallucinations. The patient is nervous/anxious.    All other systems reviewed and are negative.       Physical Exam  Temp:  [36.4 °C (97.5 °F)-36.5 °C (97.7 °F)] 36.5 °C (97.7 °F)  Pulse:  [70-77] 77  Resp:  [16-18] 18  BP: (101-122)/(53-72) 122/68  SpO2:  [96 %-98 %] 98 %    Physical Exam  Vitals signs and nursing note reviewed.   Constitutional:       General: He is not in acute distress.     Appearance: Normal appearance. He is not ill-appearing or diaphoretic.   HENT:      Head: Normocephalic and atraumatic.      Nose: Nose normal.      Mouth/Throat:      Mouth: Mucous membranes are dry.   Eyes:      General: No scleral icterus.     Extraocular Movements: Extraocular movements intact.      Conjunctiva/sclera: Conjunctivae normal.      Pupils: Pupils are equal, round, and reactive to light.   Neck:      Musculoskeletal: Normal range of motion and neck supple.   Cardiovascular:      Rate and Rhythm: Normal rate and regular rhythm.      Heart sounds:  No murmur. No gallop.       Comments: NICOLAS PIC WNL  Pulmonary:      Effort: Pulmonary effort is normal. No respiratory distress.      Breath sounds: Normal breath sounds. No stridor.   Abdominal:      General: Abdomen is flat. Bowel sounds are normal. There is no distension.      Palpations: Abdomen is soft.      Tenderness: There is no abdominal tenderness.   Genitourinary:     Comments: Andres   Musculoskeletal:         General: No swelling, tenderness or deformity.   Skin:     General: Skin is warm and dry.      Coloration: Skin is pale.      Comments: Wound vac testicle/perinum intact, functioning appropriately.    Neurological:      General: No focal deficit present.      Mental Status: He is alert and oriented to person, place, and time. Mental status is at baseline.      Cranial Nerves: No cranial nerve deficit.      Motor: Motor function is intact.      Coordination: Coordination normal.   Psychiatric:         Mood and Affect: Mood normal.         Behavior: Behavior is cooperative.         Thought Content: Thought content normal.         Cognition and Memory: Cognition is impaired.      Comments: Disoriented re: situation/events at times.         Fluids    Intake/Output Summary (Last 24 hours) at 8/20/2020 1151  Last data filed at 8/20/2020 0900  Gross per 24 hour   Intake 920 ml   Output 2290 ml   Net -1370 ml       Laboratory  Recent Labs     08/18/20  0430 08/18/20  1857 08/20/20  0331   WBC 8.1 9.3 7.3   RBC 4.72 4.83 4.74   HEMOGLOBIN 11.8* 12.0* 11.7*   HEMATOCRIT 39.0* 39.9* 39.4*   MCV 82.6 82.6 83.1   MCH 25.0* 24.8* 24.7*   MCHC 30.3* 30.1* 29.7*   RDW 54.5* 53.8* 55.1*   PLATELETCT 961* 979* 932*   MPV 10.6 10.2 10.4     Recent Labs     08/18/20  0430   SODIUM 140   POTASSIUM 3.8   CHLORIDE 105   CO2 25   GLUCOSE 157*   BUN 10   CREATININE 0.82   CALCIUM 8.7                   Imaging  EC-ECHOCARDIOGRAM COMPLETE W/ CONT   Final Result      EC-ECHOCARDIOGRAM COMPLETE W/O CONT   Final Result       IR-MIDLINE CATHETER INSERTION WO GUIDANCE > AGE 3   Final Result                  Ultrasound-guided midline placement performed by qualified nursing staff    as above.          DX-CHEST-PORTABLE (1 VIEW)   Final Result         1. No acute cardiopulmonary abnormalities are identified.      CT-ABDOMEN-PELVIS WITH   Final Result      1.  Diffuse scrotal edema, eccentric to the RIGHT with extensive soft tissue gas present particularly in the RIGHT groin and hemiscrotum, consistent with Landon's gangrene.   2.  No discrete drainable abscess demonstrated.   3.  Minimal retroperitoneal adenopathy, nonspecific.   4.  Nonobstructing LEFT kidney stone.   5.  Splenomegaly.   6.  Cystic lesion in the pancreatic tail measuring 19 mm.  Neoplasm is not excluded.  Consider further evaluation with MRI.      These findings were discussed with AYALA HAMPTON on 7/25/2020 3:41 PM.      DX-CHEST-PORTABLE (1 VIEW)   Final Result      1.  There is no acute cardiopulmonary process.           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  -ongoing pain requiring pre-medication with IV Morphine and Ativan prior to wound VAC dressing changes  -Increased gabapentin 8/18  -Continue MS Contin  -Gabapentin 400 TID, MS contin 15 BID, PRN oxy 5-10 q4h PRN  -IV morphine PRN for dressing changes only - barrier to discharge  -Completed two weeks of unasyn and fluconazole (8/17/20)  -Limit immunosuppression as much as possible to facilitate healing  -Tight glucose control, goal is less than 150  -wound vac/wound care  -SNF when able to wean off IV narcotics      Stenosis of right carotid artery-Right CEA 3/21/18- (present on admission)  Assessment & Plan  -continue ASA and Eliquis  -not on statin due to allergy      CAD (coronary artery disease)- (present on admission)  Assessment &  Plan  -chronic  -denies chest pain  -continue Eliquis  -allergy to statins      Type 2 diabetes mellitus, with long-term current use of insulin ([facility])- (present on admission)  Assessment & Plan  -he has been refusing Lantus (last dose 8/7). This has been DC'd  -chemsticks  past 24 hours  -continue Lispro TID and SSI  -continue Diabetic diet  -hypoglycemia protocol  -goal blood sugar <150 for optimal wound healing      Chronic systolic CHF (congestive heart failure) ([facility])- (present on admission)  Assessment & Plan  -without acute exacerbation  -ECHO this hospitalization unchanged from previous - EF 30%  -OP FU with Cardiology      Paroxysmal A-fib ([facility])- (present on admission)  Assessment & Plan  -Rate controlled without medication  -anticoagulated with Eliquis    Polycythemia- (present on admission)  Assessment & Plan  -has required phlebotomy in the past  -history of MI and TIA when platelets were greater than 1 million in the past  -stable  -Hematology following  -continue Hydrea  -Trend CBC with differential daily to monitor for leukopenia        Advanced care planning/counseling discussion  Assessment & Plan  -Palliative care have scheduled GOC meeting today at 1500    Constipation  Assessment & Plan  -last BM (8/19)  -continue bowel protocol           Age-related physical debility  Assessment & Plan  -Multiple comorbidities  -Now with Landon's gangrene  -Body mass index is 26.13 kg/m².  -PT/OT  -Encourage p.o. intake  -3 times daily supplements  -Fall precautions  -Sleep hygiene  -Palliative care conference yesterday. Patient remains FULL CODE.    Pancreatic cyst  Assessment & Plan  -OP FU      SLE (systemic lupus erythematosus related syndrome) ([facility])- (present on admission)  Assessment & Plan  -holding prednisone in the setting of infection  -no s/s adrenal insufficiency  -has not had steroid since 7/31/2020         VTE prophylaxis: Eliquis and ambulation    I have performed a physical exam and reviewed  and updated ROS and Assessment/Plan today (08/20/20). In review of the previous note there are no changes except as documented above    Please note that this dictation was created using voice recognition software. I have made every reasonable attempt to correct obvious errors, but there may be errors of grammar and possibly content that I did not discover before finalizing the note.    CONNER Roberts.

## 2020-08-21 LAB
BASOPHILS # BLD AUTO: 1.4 % (ref 0–1.8)
BASOPHILS # BLD: 0.09 K/UL (ref 0–0.12)
EOSINOPHIL # BLD AUTO: 0.24 K/UL (ref 0–0.51)
EOSINOPHIL NFR BLD: 3.8 % (ref 0–6.9)
ERYTHROCYTE [DISTWIDTH] IN BLOOD BY AUTOMATED COUNT: 55.4 FL (ref 35.9–50)
GLUCOSE BLD-MCNC: 122 MG/DL (ref 65–99)
GLUCOSE BLD-MCNC: 139 MG/DL (ref 65–99)
GLUCOSE BLD-MCNC: 145 MG/DL (ref 65–99)
GLUCOSE BLD-MCNC: 194 MG/DL (ref 65–99)
HCT VFR BLD AUTO: 38.7 % (ref 42–52)
HGB BLD-MCNC: 11.5 G/DL (ref 14–18)
IMM GRANULOCYTES # BLD AUTO: 0.05 K/UL (ref 0–0.11)
IMM GRANULOCYTES NFR BLD AUTO: 0.8 % (ref 0–0.9)
LYMPHOCYTES # BLD AUTO: 0.75 K/UL (ref 1–4.8)
LYMPHOCYTES NFR BLD: 11.9 % (ref 22–41)
MCH RBC QN AUTO: 24.6 PG (ref 27–33)
MCHC RBC AUTO-ENTMCNC: 29.7 G/DL (ref 33.7–35.3)
MCV RBC AUTO: 82.7 FL (ref 81.4–97.8)
MONOCYTES # BLD AUTO: 0.31 K/UL (ref 0–0.85)
MONOCYTES NFR BLD AUTO: 4.9 % (ref 0–13.4)
NEUTROPHILS # BLD AUTO: 4.85 K/UL (ref 1.82–7.42)
NEUTROPHILS NFR BLD: 77.2 % (ref 44–72)
NRBC # BLD AUTO: 0 K/UL
NRBC BLD-RTO: 0 /100 WBC
PLATELET # BLD AUTO: 875 K/UL (ref 164–446)
PMV BLD AUTO: 10.6 FL (ref 9–12.9)
RBC # BLD AUTO: 4.68 M/UL (ref 4.7–6.1)
WBC # BLD AUTO: 6.3 K/UL (ref 4.8–10.8)

## 2020-08-21 PROCEDURE — A9270 NON-COVERED ITEM OR SERVICE: HCPCS | Performed by: INTERNAL MEDICINE

## 2020-08-21 PROCEDURE — 302098 PASTE RING (FLAT): Performed by: HOSPITALIST

## 2020-08-21 PROCEDURE — A9270 NON-COVERED ITEM OR SERVICE: HCPCS | Performed by: HOSPITALIST

## 2020-08-21 PROCEDURE — 700101 HCHG RX REV CODE 250: Performed by: INTERNAL MEDICINE

## 2020-08-21 PROCEDURE — 700111 HCHG RX REV CODE 636 W/ 250 OVERRIDE (IP)

## 2020-08-21 PROCEDURE — 700102 HCHG RX REV CODE 250 W/ 637 OVERRIDE(OP): Performed by: HOSPITALIST

## 2020-08-21 PROCEDURE — 85025 COMPLETE CBC W/AUTO DIFF WBC: CPT

## 2020-08-21 PROCEDURE — 770006 HCHG ROOM/CARE - MED/SURG/GYN SEMI*

## 2020-08-21 PROCEDURE — 700111 HCHG RX REV CODE 636 W/ 250 OVERRIDE (IP): Performed by: HOSPITALIST

## 2020-08-21 PROCEDURE — 99232 SBSQ HOSP IP/OBS MODERATE 35: CPT | Performed by: HOSPITALIST

## 2020-08-21 PROCEDURE — 700111 HCHG RX REV CODE 636 W/ 250 OVERRIDE (IP): Performed by: NURSE PRACTITIONER

## 2020-08-21 PROCEDURE — 36415 COLL VENOUS BLD VENIPUNCTURE: CPT

## 2020-08-21 PROCEDURE — 82962 GLUCOSE BLOOD TEST: CPT | Mod: 91

## 2020-08-21 PROCEDURE — 700102 HCHG RX REV CODE 250 W/ 637 OVERRIDE(OP): Performed by: INTERNAL MEDICINE

## 2020-08-21 RX ORDER — MORPHINE SULFATE 4 MG/ML
4 INJECTION, SOLUTION INTRAMUSCULAR; INTRAVENOUS ONCE
Status: COMPLETED | OUTPATIENT
Start: 2020-08-21 | End: 2020-08-21

## 2020-08-21 RX ORDER — MORPHINE SULFATE 4 MG/ML
4 INJECTION, SOLUTION INTRAMUSCULAR; INTRAVENOUS
Status: DISCONTINUED | OUTPATIENT
Start: 2020-08-21 | End: 2020-08-21

## 2020-08-21 RX ORDER — MORPHINE SULFATE 4 MG/ML
INJECTION, SOLUTION INTRAMUSCULAR; INTRAVENOUS
Status: COMPLETED
Start: 2020-08-21 | End: 2020-08-21

## 2020-08-21 RX ORDER — MORPHINE SULFATE 4 MG/ML
2-4 INJECTION, SOLUTION INTRAMUSCULAR; INTRAVENOUS
Status: DISCONTINUED | OUTPATIENT
Start: 2020-08-21 | End: 2020-08-22 | Stop reason: HOSPADM

## 2020-08-21 RX ORDER — MORPHINE SULFATE 10 MG/ML
5 INJECTION, SOLUTION INTRAMUSCULAR; INTRAVENOUS
Status: DISCONTINUED | OUTPATIENT
Start: 2020-08-21 | End: 2020-08-22 | Stop reason: HOSPADM

## 2020-08-21 RX ADMIN — ASPIRIN 81 MG: 81 TABLET, COATED ORAL at 08:49

## 2020-08-21 RX ADMIN — LIDOCAINE HYDROCHLORIDE 20 ML: 20 INJECTION, SOLUTION INFILTRATION; PERINEURAL at 10:11

## 2020-08-21 RX ADMIN — MORPHINE SULFATE 5 MG: 10 INJECTION INTRAVENOUS at 10:11

## 2020-08-21 RX ADMIN — GABAPENTIN 500 MG: 100 CAPSULE ORAL at 08:49

## 2020-08-21 RX ADMIN — OXYCODONE HYDROCHLORIDE 10 MG: 10 TABLET ORAL at 19:34

## 2020-08-21 RX ADMIN — MORPHINE SULFATE 15 MG: 15 TABLET, EXTENDED RELEASE ORAL at 21:36

## 2020-08-21 RX ADMIN — MORPHINE SULFATE 4 MG: 4 INJECTION INTRAVENOUS at 11:18

## 2020-08-21 RX ADMIN — AZATHIOPRINE 50 MG: 50 TABLET ORAL at 19:36

## 2020-08-21 RX ADMIN — OXYCODONE HYDROCHLORIDE 10 MG: 10 TABLET ORAL at 12:12

## 2020-08-21 RX ADMIN — THERA TABS 1 TABLET: TAB at 08:49

## 2020-08-21 RX ADMIN — SENNOSIDES-DOCUSATE SODIUM TAB 8.6-50 MG 2 TABLET: 8.6-5 TAB at 08:49

## 2020-08-21 RX ADMIN — OMEPRAZOLE 20 MG: 20 CAPSULE, DELAYED RELEASE ORAL at 08:49

## 2020-08-21 RX ADMIN — HYDROXYUREA 2500 MG: 500 CAPSULE ORAL at 21:36

## 2020-08-21 RX ADMIN — LORAZEPAM 0.5 MG: 2 INJECTION INTRAMUSCULAR; INTRAVENOUS at 10:11

## 2020-08-21 RX ADMIN — MORPHINE SULFATE 2 MG: 4 INJECTION INTRAVENOUS at 13:40

## 2020-08-21 RX ADMIN — AZATHIOPRINE 50 MG: 50 TABLET ORAL at 08:49

## 2020-08-21 RX ADMIN — APIXABAN 5 MG: 5 TABLET, FILM COATED ORAL at 08:49

## 2020-08-21 RX ADMIN — SENNOSIDES-DOCUSATE SODIUM TAB 8.6-50 MG 2 TABLET: 8.6-5 TAB at 19:34

## 2020-08-21 RX ADMIN — GABAPENTIN 500 MG: 100 CAPSULE ORAL at 19:34

## 2020-08-21 RX ADMIN — MORPHINE SULFATE 15 MG: 15 TABLET, EXTENDED RELEASE ORAL at 08:49

## 2020-08-21 RX ADMIN — GABAPENTIN 500 MG: 100 CAPSULE ORAL at 13:37

## 2020-08-21 ASSESSMENT — ENCOUNTER SYMPTOMS
DIARRHEA: 0
FLANK PAIN: 0
SEIZURES: 0
BACK PAIN: 0
SPEECH CHANGE: 0
NAUSEA: 0
TREMORS: 0
LOSS OF CONSCIOUSNESS: 0
DEPRESSION: 0
ABDOMINAL PAIN: 0
DIZZINESS: 0
DOUBLE VISION: 0
WEAKNESS: 1
PHOTOPHOBIA: 0
HEADACHES: 0
FALLS: 0
SORE THROAT: 0
SHORTNESS OF BREATH: 0
PND: 0
NERVOUS/ANXIOUS: 1
BLOOD IN STOOL: 0
SPUTUM PRODUCTION: 0
MYALGIAS: 1
WHEEZING: 0
TINGLING: 0
FEVER: 0
FOCAL WEAKNESS: 0
CHILLS: 0
HALLUCINATIONS: 0
SENSORY CHANGE: 0
BLURRED VISION: 0

## 2020-08-21 NOTE — CARE PLAN
Problem: Pain Management  Goal: Pain level will decrease to patient's comfort goal  Outcome: PROGRESSING AS EXPECTED  Flowsheets (Taken 8/20/2020 1915)  Pain Rating Scale (NPRS): 4  Note: Pt's pain currently managed with pharm methods per MAR as well as non pharm methods and rest. Pt uses 1 to 10 pain scale to report pain. Will continue to monitor.     Problem: Communication  Goal: The ability to communicate needs accurately and effectively will improve  Outcome: PROGRESSING AS EXPECTED  Note: Pt educated to use call light when in need of assistance. Pt is able to communicate needs appropriately.

## 2020-08-21 NOTE — PROGRESS NOTES
"Hospital Medicine Daily Progress Note    Date of Service  8/21/2020    Chief Complaint  Scrotal pain and swelling    Hospital Course    Mr. Schulte is a 70-year-old male with PMH significant for CAD with Mix 3 and in-stent restenosis, TIA's chronic systolic CHF, PAF on no rate control medication chronically anticoagulated with apixaban, right CEA, and DM 2 who presented 7/25/2020 with scrotal pain and swelling.  He was found to have right scrotal abscess and Landon's gangrene.  Urology was emergently consulted and patient underwent washout on 7/25 with 3 more subsequent washouts on 7/29, 7/31, and 8/3 with wound VAC placement. He had acute on chronic flare of his polycythemia. Hematology was consulted and increased his Hydrea on 8/18. Platelets have been trending down.       Interval Problem Update  8/21 - -per wound team, wound is rolling in on itself. Patient also reports increased \"shooting\" pain up his right groin. Discussed with Urology today. May possibly need another washout    8/20 - agreeable to try IR PO pain meds prior to dressing change tomorrow but wants IV meds available \"just in case\". We discussed the dangers of potentially over-medicating patient with narcotics. We will re-assess this tomorrow.  -he has asked me to call his \"medical daughter\" Charla. I called and spoke with her for 20 minutes - updated on plan of care, questions answered.     8/19 - BM today  -sleeping during rounds. Arouses easily. We discussed DC plan and need to wean off IV MSO4 during dressing changes. He reports he was [facility] and [facility] MSO4. Discussed with CM, this is not the case. He has asked me to come up with a PO pain plan and discuss with him tomorrow. Tearful when discussing - he reports the \"pain is excruciating\" during dressing changes.   8/18 - Still no BM.  Last BM 8/14  -[place] ambulating [place] with CNA this morning  -Plan for meeting with family and palliative care at 1500 " today    Consultants/Specialty  Palliative care  Hematology    Code Status  Full Code    Disposition  TBD -Per recent CM note, SNF referral will be updated and resubmitted once the patient no longer requires IV pain meds for the wound dressing changes    Review of Systems  Review of Systems   Constitutional: Negative for chills and fever.   HENT: Negative for hearing loss, sore throat and tinnitus.    Eyes: Negative for blurred vision, double vision and photophobia.   Respiratory: Negative for sputum production, shortness of breath and wheezing.    Cardiovascular: Negative for chest pain, leg swelling and PND.   Gastrointestinal: Negative for abdominal pain, blood in stool, diarrhea and nausea.   Genitourinary: Negative for dysuria, flank pain, frequency, hematuria and urgency.        Right testicular pain with radiation up right groin   Musculoskeletal: Positive for myalgias (Stable). Negative for back pain, falls and joint pain.   Neurological: Positive for weakness. Negative for dizziness, tingling, tremors, sensory change, speech change, focal weakness, seizures, loss of consciousness and headaches.   Psychiatric/Behavioral: Negative for depression and hallucinations. The patient is nervous/anxious.    All other systems reviewed and are negative.       Physical Exam  Temp:  [36.4 °C (97.6 °F)-36.9 °C (98.4 °F)] 36.4 °C (97.6 °F)  Pulse:  [71-84] 74  Resp:  [16-18] 18  BP: (101-134)/(72-77) 134/74  SpO2:  [92 %-99 %] 99 %    Physical Exam  Vitals signs and nursing note reviewed.   Constitutional:       General: He is not in acute distress.     Appearance: Normal appearance. He is not ill-appearing or diaphoretic.      Comments: Appears in pain   HENT:      Head: Normocephalic and atraumatic.      Nose: Nose normal.      Mouth/Throat:      Mouth: Mucous membranes are dry.   Eyes:      General: No scleral icterus.     Extraocular Movements: Extraocular movements intact.      Conjunctiva/sclera: Conjunctivae normal.       Pupils: Pupils are equal, round, and reactive to light.   Neck:      Musculoskeletal: Normal range of motion and neck supple.   Cardiovascular:      Rate and Rhythm: Normal rate and regular rhythm.      Heart sounds: No murmur. No gallop.       Comments: NICOLAS [facility] WNL  Pulmonary:      Effort: Pulmonary effort is normal. No respiratory distress.      Breath sounds: Normal breath sounds. No stridor.   Abdominal:      General: Abdomen is flat. Bowel sounds are normal. There is no distension.      Palpations: Abdomen is soft.      Tenderness: There is no abdominal tenderness.   Genitourinary:     Comments: Dmitry   Musculoskeletal:         General: No swelling, tenderness or deformity.   Skin:     General: Skin is warm and dry.      Coloration: Skin is pale.      Comments: Wound vac testicle/perinum intact, functioning appropriately.    Neurological:      General: No focal deficit present.      Mental Status: He is alert and oriented to person, place, and time. Mental status is at baseline.      Cranial Nerves: No cranial nerve deficit.      Motor: Motor function is intact.      Coordination: Coordination normal.   Psychiatric:         Mood and Affect: Mood normal.         Behavior: Behavior is cooperative.         Thought Content: Thought content normal.         Judgment: Judgment normal.         Fluids    Intake/Output Summary (Last 24 hours) at 8/21/2020 1338  Last data filed at 8/21/2020 1200  Gross per 24 hour   Intake 1080 ml   Output 2450 ml   Net -1370 ml       Laboratory  Recent Labs     08/18/20  1857 08/20/20  0331 08/21/20  0436   WBC 9.3 7.3 6.3   RBC 4.83 4.74 4.68*   HEMOGLOBIN 12.0* 11.7* 11.5*   HEMATOCRIT 39.9* 39.4* 38.7*   MCV 82.6 83.1 82.7   MCH 24.8* 24.7* 24.6*   MCHC 30.1* 29.7* 29.7*   RDW 53.8* 55.1* 55.4*   PLATELETCT 979* 932* 875*   MPV 10.2 10.4 10.6                       Imaging  EC-ECHOCARDIOGRAM COMPLETE W/ CONT   Final Result      EC-ECHOCARDIOGRAM COMPLETE W/O CONT   Final Result  "     IR-MIDLINE CATHETER INSERTION WO GUIDANCE > AGE 3   Final Result                  Ultrasound-guided midline placement performed by qualified nursing staff    as above.          DX-CHEST-PORTABLE (1 VIEW)   Final Result         1. No acute cardiopulmonary abnormalities are identified.      CT-ABDOMEN-PELVIS WITH   Final Result      1.  Diffuse scrotal edema, eccentric to the RIGHT with extensive soft tissue gas present particularly in the RIGHT groin and hemiscrotum, consistent with Landon's gangrene.   2.  No discrete drainable abscess demonstrated.   3.  Minimal retroperitoneal adenopathy, nonspecific.   4.  Nonobstructing LEFT kidney stone.   5.  Splenomegaly.   6.  Cystic lesion in the pancreatic tail measuring 19 mm.  Neoplasm is not excluded.  Consider further evaluation with MRI.      These findings were discussed with AYALA HAMPTON on 7/25/2020 3:41 PM.      DX-CHEST-PORTABLE (1 VIEW)   Final Result      1.  There is no acute cardiopulmonary process.           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  -ongoing pain requiring pre-medication with IV Morphine and Ativan prior to wound VAC dressing changes  -per wound team, wound is rolling in on itself. Patient also reports increased \"shooting\" pain up his right groin. Discussed with Urology today. May possibly need another washout  -Increased gabapentin 8/18  -Continue MS Contin  -Continue Gabapentin, MS contin 15 BID, PRN oxy   -IV morphine PRN  -Completed two weeks of unasyn and fluconazole (8/17/20)  -Limit immunosuppression as much as possible to facilitate healing  -Tight glucose control, goal is less than 150  -wound vac/wound care  -SNF when able to wean off IV narcotics      Stenosis of right carotid artery-Right CEA 3/21/18- (present on admission)  Assessment & Plan  -continue ASA and " Eliquis  -not on statin due to allergy      CAD (coronary artery disease)- (present on admission)  Assessment & Plan  -chronic  -denies chest pain  -continue Eliquis  -allergy to statins      Type 2 diabetes mellitus, with long-term current use of insulin (Former[facility])- (present on admission)  Assessment & Plan  -he has been refusing Lantus (last dose 8/7). This has been DC'd  -chemsticks 133-194 past 24 hours  -continue Lispro TID and SSI  -continue Diabetic diet  -hypoglycemia protocol  -goal blood sugar <150 for optimal wound healing      Chronic systolic CHF (congestive heart failure) ([facility])- (present on admission)  Assessment & Plan  -without acute exacerbation  -ECHO this hospitalization unchanged from previous - EF 30%  -OP FU with Cardiology      Paroxysmal A-fib ([facility])- (present on admission)  Assessment & Plan  -Rate controlled without medication  -anticoagulated with Eliquis    Polycythemia- (present on admission)  Assessment & Plan  -has required phlebotomy in the past  -history of MI and TIA when platelets were greater than 1 million in the past  -stable  -Hematology following  -continue Hydrea  -Trend CBC with differential daily to monitor for leukopenia - platelets trending down        TIA (transient ischemic attack)  Assessment & Plan  -history of    Advanced care planning/counseling discussion  Assessment & Plan  -Palliative care have scheduled GOC meeting today at 1500    Constipation  Assessment & Plan  -last BM (8/19)  -continue bowel protocol           Age-related physical debility  Assessment & Plan  -Multiple comorbidities  -Now with Landon's gangrene  -Body mass index is 26.13 kg/m².  -PT/OT  -Encourage p.o. intake  -3 times daily supplements  -Fall precautions  -Sleep hygiene  -Palliative care conference yesterday. Patient remains FULL CODE.    Pancreatic cyst  Assessment & Plan  -OP FU      SLE (systemic lupus erythematosus related syndrome) ([facility])- (present on admission)  Assessment &  Plan  -holding prednisone in the setting of infection  -no s/s adrenal insufficiency  -has not had steroid since 7/31/2020      Lupus (HCC)- (present on admission)  Assessment & Plan  -chronic and stable at his baseline       VTE prophylaxis: Eliquis and ambulation    I have performed a physical exam and reviewed and updated ROS and Assessment/Plan today (08/21/20). In review of the previous note there are no changes except as documented above    Please note that this dictation was created using voice recognition software. I have made every reasonable attempt to correct obvious errors, but there may be errors of grammar and possibly content that I did not discover before finalizing the note.    CONNER Roberts.

## 2020-08-21 NOTE — DISCHARGE PLANNING
Anticipated Discharge Disposition: SNF    Action: Pt discussed during rounds and Dr. Sung anticipates to have pt transition to oral pain medication on Monday 8/24.     Pt previously declined for [facility]. Barrier for SNF is the IV pain meds.     Barriers to Discharge: None     Plan: attend IDT rounds for updates, LSW to assist as needed

## 2020-08-21 NOTE — PROGRESS NOTES
Received report from Parvin JORGENSEN at 1912. Pt is currently resting in bed with family at bedside. Pt is awake and alert with no signs of distress. Pt reports 4/10 pain but does not want any pain medication at this time. Call light and belongings are within reach. All needs met at this time. Will continue to monitor.

## 2020-08-21 NOTE — PROGRESS NOTES
Received report from noc shift nurse. Assumed pt care at 0715. Pt is sleeping comfortably in bed with even and unlabored breathing. Pt on 2L oxygen via nasal cannula. No signs of SOB/respiratory distress. Labs noted, VSS.  Will return for morning meds and assessment. Fall precautions in place. Bed at lowest position. Call light and personal belongings within reach. Continue to monitor

## 2020-08-21 NOTE — WOUND TEAM
[facility]  Inpatient Services   Wound and Skin Care Progress Note    Admission Date: 7/25/2020     Last order of IP CONSULT TO WOUND CARE was found on 7/30/2020 from Hospital Encounter on 7/25/2020     HPI, PMH, SH: Reviewed    Unit where seen by Wound Team: 2209/01     WOUND CONSULT/FOLLOW UP RELATED TO:  Vac change to right scrotum     Self Report / Pain Level:  IV morphine, Ativan, Topical Lidocaine. States it is much worse than before.      OBJECTIVE:  Patient on pressure redistribution mattress with waffle overlay. Previous vac dressing intact.     WOUND TYPE, LOCATION, CHARACTERISTICS (Pressure Injuries: location, stage, POA or date identified)     Wound 07/25/20 Full Thickness Wound Scrotum Right (Active)   Wound Image   08/17/20     Site Assessment Red;Early/partial granulation;Painful;Closed Wound Edges 08/21/20    Periwound Assessment Intact;Painful    Margins Attached edges;Defined edges;Epibole (rolled edges)    Closure Secondary intention    Drainage Amount Scant    Drainage Description Serosanguineous    Treatments Cleansed;Site care    Wound Cleansing Normal Saline Irrigation    Periwound Protectant Benzoin;Paste Ring    Dressing Cleansing/Solutions Normal Saline    Dressing Options Moist Roll Gauze;Absorbent Abdominal Pad    Dressing Changed New    Dressing Status Clean;Dry;Intact    Dressing Change/Treatment Frequency Every Shift, and As Needed    NEXT Dressing Change/Treatment Date 08/21/20    NEXT Weekly Photo (Inpatient Only) 08/24/20    Non-staged Wound Description Full thickness    Wound Length (cm) 11 cm 08/17/20    Wound Width (cm) 5 cm    Wound Depth (cm) 4.3 cm    Wound Surface Area (cm^2) 55 cm^2    Wound Volume (cm^3) 236.5 cm^3    Wound Healing % -431    Wound Bed Granulation (%) 100 %    Wound Bed Slough (%) 0 %    Wound Bed Eschar (%) 0 %    Tunneling (cm) 3 cm    Tunneling Clock Position of Wound 11    Shape Irregular 08/21/20    Wound Odor None    Pulses N/A     Exposed Structures Other (Testicle)        Vascular:    JW:   No results found.    Lab Values:    Lab Results   Component Value Date/Time    WBC 6.3 08/21/2020 04:36 AM    RBC 4.68 (L) 08/21/2020 04:36 AM    HEMOGLOBIN 11.5 (L) 08/21/2020 04:36 AM    HEMATOCRIT 38.7 (L) 08/21/2020 04:36 AM    CREACTPROT 0.47 08/20/2020 03:31 AM    SEDRATEWES 36 (H) 08/20/2020 03:31 AM    HBA1C 8.6 (A) 01/07/2020 07:14 AM        Culture Results show:  Recent Results (from the past 720 hour(s))   CULTURE WOUND W/ GRAM STAIN    Collection Time: 07/31/20  6:45 PM    Specimen: Wound   Result Value Ref Range    Significant Indicator POS (POS)     Source WND     Site SCROTAL ABSCESS     Culture Result (A)      Growth noted after further incubation, see below for  organism identification.      Gram Stain Result       Many WBCs.  Rare Gram positive cocci.  Rare Yeast.      Culture Result Candida albicans  Rare growth   (A)        INTERVENTIONS BY WOUND TEAM:  Patient pre-medicated for anxiety. Soaked dressing with NS and lidocaine. Pt then medicated with IV morphine 5mg.  Removed vac dressing, no retained foam. Cleansed wound and periwound with NS and gauze, dried. Pt co extreme pain along indurated area to R upper groin / wound edge. R wound edge closed over. Updated CHANTEL Randhawa about closed wound edge, increased pain & induration. Urology re-consulted. WTD with NS placed, covered with abd and secured with briefs. Possible return to OR. Wound team will continue to follow.     Interdisciplinary consultation: Patient, Bedside RN, Wound RN Eunice, CHANTEL Randhawa    EVALUATION: Patient's wound continues to improve, less induration and more tolerable pain. Right scrotum will continue to benefit from NPWT VF to help increase granulation and oxygenation to the area, manage drainage, and close by secondary intention. Wound team to follow up 3x/week for dressing changes.     Goals: Steady decrease in wound area and depth weekly.      NURSING PLAN OF CARE ORDERS (X):    Dressing changes: See Dressing Care orders: X  Skin care: See Skin Care orders: X  Rectal tube care: See Rectal Tube Care orders:   Other orders:    WOUND TEAM PLAN OF CARE:   Dressing changes by wound team:                   Follow up 3 times weekly:                NPWT change 3 times weekly:     Follow up 1-2 times weekly:      Follow up Bi-Monthly:                   Follow up as needed:       Other (explain):  Will monitor for surgical plan and post surgical wound need.     Anticipated discharge plans: Will need VAC and dressing changes 3x/week  LTACH:        SNF/Rehab:                  Home Health Care:           Outpatient Wound Center:            Self Care:

## 2020-08-22 ENCOUNTER — HOSPITAL ENCOUNTER (INPATIENT)
Facility: MEDICAL CENTER | Age: 70
LOS: 9 days | DRG: 577 | End: 2020-08-31
Attending: HOSPITALIST | Admitting: HOSPITALIST
Payer: MEDICARE

## 2020-08-22 VITALS
TEMPERATURE: 97.8 F | BODY MASS INDEX: 26.11 KG/M2 | WEIGHT: 203.48 LBS | RESPIRATION RATE: 18 BRPM | HEIGHT: 74 IN | HEART RATE: 71 BPM | SYSTOLIC BLOOD PRESSURE: 114 MMHG | DIASTOLIC BLOOD PRESSURE: 70 MMHG | OXYGEN SATURATION: 96 %

## 2020-08-22 DIAGNOSIS — Z94.5 STATUS POST SKIN GRAFT: ICD-10-CM

## 2020-08-22 DIAGNOSIS — N49.3 FOURNIER'S GANGRENE OF SCROTUM: ICD-10-CM

## 2020-08-22 LAB
BASOPHILS # BLD AUTO: 1 % (ref 0–1.8)
BASOPHILS # BLD: 0.06 K/UL (ref 0–0.12)
EOSINOPHIL # BLD AUTO: 0.12 K/UL (ref 0–0.51)
EOSINOPHIL NFR BLD: 2 % (ref 0–6.9)
ERYTHROCYTE [DISTWIDTH] IN BLOOD BY AUTOMATED COUNT: 54.9 FL (ref 35.9–50)
GLUCOSE BLD-MCNC: 124 MG/DL (ref 65–99)
GLUCOSE BLD-MCNC: 141 MG/DL (ref 65–99)
GLUCOSE BLD-MCNC: 145 MG/DL (ref 65–99)
GLUCOSE BLD-MCNC: 219 MG/DL (ref 65–99)
HCT VFR BLD AUTO: 39.1 % (ref 42–52)
HGB BLD-MCNC: 11.7 G/DL (ref 14–18)
HYPOCHROMIA BLD QL SMEAR: ABNORMAL
LG PLATELETS BLD QL SMEAR: NORMAL
LYMPHOCYTES # BLD AUTO: 0.36 K/UL (ref 1–4.8)
LYMPHOCYTES NFR BLD: 6 % (ref 22–41)
MANUAL DIFF BLD: NORMAL
MCH RBC QN AUTO: 24.5 PG (ref 27–33)
MCHC RBC AUTO-ENTMCNC: 29.9 G/DL (ref 33.7–35.3)
MCV RBC AUTO: 81.8 FL (ref 81.4–97.8)
METAMYELOCYTES NFR BLD MANUAL: 1 %
MICROCYTES BLD QL SMEAR: ABNORMAL
MONOCYTES # BLD AUTO: 0.18 K/UL (ref 0–0.85)
MONOCYTES NFR BLD AUTO: 3 % (ref 0–13.4)
NEUTROPHILS # BLD AUTO: 5.22 K/UL (ref 1.82–7.42)
NEUTROPHILS NFR BLD: 87 % (ref 44–72)
NRBC # BLD AUTO: 0 K/UL
NRBC BLD-RTO: 0 /100 WBC
PLATELET # BLD AUTO: 809 K/UL (ref 164–446)
PLATELET BLD QL SMEAR: NORMAL
PMV BLD AUTO: 10.6 FL (ref 9–12.9)
POLYCHROMASIA BLD QL SMEAR: NORMAL
RBC # BLD AUTO: 4.78 M/UL (ref 4.7–6.1)
RBC BLD AUTO: PRESENT
WBC # BLD AUTO: 6 K/UL (ref 4.8–10.8)

## 2020-08-22 PROCEDURE — 770006 HCHG ROOM/CARE - MED/SURG/GYN SEMI*

## 2020-08-22 PROCEDURE — 700111 HCHG RX REV CODE 636 W/ 250 OVERRIDE (IP): Performed by: NURSE PRACTITIONER

## 2020-08-22 PROCEDURE — 82962 GLUCOSE BLOOD TEST: CPT

## 2020-08-22 PROCEDURE — 82962 GLUCOSE BLOOD TEST: CPT | Mod: 91

## 2020-08-22 PROCEDURE — A9270 NON-COVERED ITEM OR SERVICE: HCPCS | Performed by: HOSPITALIST

## 2020-08-22 PROCEDURE — 700102 HCHG RX REV CODE 250 W/ 637 OVERRIDE(OP): Performed by: INTERNAL MEDICINE

## 2020-08-22 PROCEDURE — 700102 HCHG RX REV CODE 250 W/ 637 OVERRIDE(OP): Performed by: NURSE PRACTITIONER

## 2020-08-22 PROCEDURE — A9270 NON-COVERED ITEM OR SERVICE: HCPCS | Performed by: NURSE PRACTITIONER

## 2020-08-22 PROCEDURE — 700102 HCHG RX REV CODE 250 W/ 637 OVERRIDE(OP): Performed by: HOSPITALIST

## 2020-08-22 PROCEDURE — 36415 COLL VENOUS BLD VENIPUNCTURE: CPT

## 2020-08-22 PROCEDURE — 700101 HCHG RX REV CODE 250: Performed by: NURSE PRACTITIONER

## 2020-08-22 PROCEDURE — 700111 HCHG RX REV CODE 636 W/ 250 OVERRIDE (IP): Performed by: HOSPITALIST

## 2020-08-22 PROCEDURE — 99239 HOSP IP/OBS DSCHRG MGMT >30: CPT | Performed by: HOSPITALIST

## 2020-08-22 PROCEDURE — A9270 NON-COVERED ITEM OR SERVICE: HCPCS | Performed by: INTERNAL MEDICINE

## 2020-08-22 PROCEDURE — 85027 COMPLETE CBC AUTOMATED: CPT

## 2020-08-22 PROCEDURE — 85007 BL SMEAR W/DIFF WBC COUNT: CPT

## 2020-08-22 RX ORDER — LIDOCAINE HYDROCHLORIDE 20 MG/ML
20 INJECTION, SOLUTION INFILTRATION; PERINEURAL
Status: DISCONTINUED | OUTPATIENT
Start: 2020-08-22 | End: 2020-08-31 | Stop reason: HOSPADM

## 2020-08-22 RX ORDER — BISACODYL 10 MG
10 SUPPOSITORY, RECTAL RECTAL
Status: DISCONTINUED | OUTPATIENT
Start: 2020-08-22 | End: 2020-08-31 | Stop reason: HOSPADM

## 2020-08-22 RX ORDER — OXYCODONE HYDROCHLORIDE 10 MG/1
10-15 TABLET ORAL EVERY 4 HOURS PRN
Status: DISCONTINUED | OUTPATIENT
Start: 2020-08-22 | End: 2020-08-29

## 2020-08-22 RX ORDER — OMEPRAZOLE 20 MG/1
20 CAPSULE, DELAYED RELEASE ORAL DAILY
Status: CANCELLED | OUTPATIENT
Start: 2020-08-23

## 2020-08-22 RX ORDER — AZATHIOPRINE 50 MG/1
50 TABLET ORAL 2 TIMES DAILY
Status: DISCONTINUED | OUTPATIENT
Start: 2020-08-22 | End: 2020-08-31 | Stop reason: HOSPADM

## 2020-08-22 RX ORDER — ONDANSETRON 2 MG/ML
4 INJECTION INTRAMUSCULAR; INTRAVENOUS EVERY 4 HOURS PRN
Status: CANCELLED | OUTPATIENT
Start: 2020-08-22

## 2020-08-22 RX ORDER — MORPHINE SULFATE 15 MG/1
15 TABLET, FILM COATED, EXTENDED RELEASE ORAL EVERY 12 HOURS
Status: CANCELLED | OUTPATIENT
Start: 2020-08-22

## 2020-08-22 RX ORDER — ALBUTEROL SULFATE 90 UG/1
2 AEROSOL, METERED RESPIRATORY (INHALATION) EVERY 4 HOURS PRN
Status: CANCELLED | OUTPATIENT
Start: 2020-08-22

## 2020-08-22 RX ORDER — LORAZEPAM 2 MG/ML
0.5 INJECTION INTRAMUSCULAR
Status: CANCELLED | OUTPATIENT
Start: 2020-08-22

## 2020-08-22 RX ORDER — OXYCODONE HYDROCHLORIDE 10 MG/1
10 TABLET ORAL ONCE
Status: COMPLETED | OUTPATIENT
Start: 2020-08-22 | End: 2020-08-22

## 2020-08-22 RX ORDER — MICONAZOLE NITRATE 20 MG/G
CREAM TOPICAL PRN
Status: DISCONTINUED | OUTPATIENT
Start: 2020-08-22 | End: 2020-08-31 | Stop reason: HOSPADM

## 2020-08-22 RX ORDER — BISACODYL 10 MG
10 SUPPOSITORY, RECTAL RECTAL
Status: CANCELLED | OUTPATIENT
Start: 2020-08-22

## 2020-08-22 RX ORDER — MORPHINE SULFATE 10 MG/ML
5 INJECTION, SOLUTION INTRAMUSCULAR; INTRAVENOUS
Status: DISCONTINUED | OUTPATIENT
Start: 2020-08-22 | End: 2020-08-31 | Stop reason: HOSPADM

## 2020-08-22 RX ORDER — AMOXICILLIN 250 MG
2 CAPSULE ORAL 2 TIMES DAILY
Status: DISCONTINUED | OUTPATIENT
Start: 2020-08-22 | End: 2020-08-31 | Stop reason: HOSPADM

## 2020-08-22 RX ORDER — CALCIUM CARBONATE 500 MG/1
500-1000 TABLET, CHEWABLE ORAL EVERY 6 HOURS PRN
Status: CANCELLED | OUTPATIENT
Start: 2020-08-22

## 2020-08-22 RX ORDER — MORPHINE SULFATE 4 MG/ML
2-4 INJECTION, SOLUTION INTRAMUSCULAR; INTRAVENOUS
Status: CANCELLED | OUTPATIENT
Start: 2020-08-22

## 2020-08-22 RX ORDER — OXYCODONE HYDROCHLORIDE 5 MG/1
5 TABLET ORAL EVERY 4 HOURS PRN
Status: DISCONTINUED | OUTPATIENT
Start: 2020-08-22 | End: 2020-08-22

## 2020-08-22 RX ORDER — MICONAZOLE NITRATE 20 MG/G
CREAM TOPICAL PRN
Status: CANCELLED | OUTPATIENT
Start: 2020-08-22

## 2020-08-22 RX ORDER — OXYCODONE HYDROCHLORIDE 5 MG/1
5 TABLET ORAL EVERY 4 HOURS PRN
Status: CANCELLED | OUTPATIENT
Start: 2020-08-22

## 2020-08-22 RX ORDER — OXYCODONE HYDROCHLORIDE 10 MG/1
10 TABLET ORAL EVERY 4 HOURS PRN
Status: DISCONTINUED | OUTPATIENT
Start: 2020-08-22 | End: 2020-08-22

## 2020-08-22 RX ORDER — AZATHIOPRINE 50 MG/1
50 TABLET ORAL 2 TIMES DAILY
Status: CANCELLED | OUTPATIENT
Start: 2020-08-22

## 2020-08-22 RX ORDER — DEXTROSE MONOHYDRATE 25 G/50ML
50 INJECTION, SOLUTION INTRAVENOUS
Status: DISCONTINUED | OUTPATIENT
Start: 2020-08-22 | End: 2020-08-27

## 2020-08-22 RX ORDER — HYDROXYUREA 500 MG/1
1500 CAPSULE ORAL NIGHTLY
Status: CANCELLED | OUTPATIENT
Start: 2020-08-22

## 2020-08-22 RX ORDER — MORPHINE SULFATE 10 MG/ML
5 INJECTION, SOLUTION INTRAMUSCULAR; INTRAVENOUS
Status: CANCELLED | OUTPATIENT
Start: 2020-08-22

## 2020-08-22 RX ORDER — POLYETHYLENE GLYCOL 3350 17 G/17G
1 POWDER, FOR SOLUTION ORAL 2 TIMES DAILY
Status: CANCELLED | OUTPATIENT
Start: 2020-08-22

## 2020-08-22 RX ORDER — ONDANSETRON 2 MG/ML
4 INJECTION INTRAMUSCULAR; INTRAVENOUS EVERY 4 HOURS PRN
Status: DISCONTINUED | OUTPATIENT
Start: 2020-08-22 | End: 2020-08-31 | Stop reason: HOSPADM

## 2020-08-22 RX ORDER — ONDANSETRON 4 MG/1
4 TABLET, ORALLY DISINTEGRATING ORAL EVERY 4 HOURS PRN
Status: DISCONTINUED | OUTPATIENT
Start: 2020-08-22 | End: 2020-08-31 | Stop reason: HOSPADM

## 2020-08-22 RX ORDER — AMOXICILLIN 250 MG
2 CAPSULE ORAL 2 TIMES DAILY
Status: CANCELLED | OUTPATIENT
Start: 2020-08-22

## 2020-08-22 RX ORDER — LORAZEPAM 2 MG/ML
0.5 INJECTION INTRAMUSCULAR
Status: DISCONTINUED | OUTPATIENT
Start: 2020-08-22 | End: 2020-08-31 | Stop reason: HOSPADM

## 2020-08-22 RX ORDER — OMEPRAZOLE 20 MG/1
20 CAPSULE, DELAYED RELEASE ORAL DAILY
Status: DISCONTINUED | OUTPATIENT
Start: 2020-08-23 | End: 2020-08-31 | Stop reason: HOSPADM

## 2020-08-22 RX ORDER — OXYCODONE HYDROCHLORIDE 10 MG/1
10 TABLET ORAL EVERY 4 HOURS PRN
Status: CANCELLED | OUTPATIENT
Start: 2020-08-22

## 2020-08-22 RX ORDER — CALCIUM CARBONATE 500 MG/1
500-1000 TABLET, CHEWABLE ORAL EVERY 6 HOURS PRN
Status: DISCONTINUED | OUTPATIENT
Start: 2020-08-22 | End: 2020-08-31 | Stop reason: HOSPADM

## 2020-08-22 RX ORDER — HYDROXYUREA 500 MG/1
1500 CAPSULE ORAL NIGHTLY
Status: DISCONTINUED | OUTPATIENT
Start: 2020-08-22 | End: 2020-08-22 | Stop reason: HOSPADM

## 2020-08-22 RX ORDER — ONDANSETRON 4 MG/1
4 TABLET, ORALLY DISINTEGRATING ORAL EVERY 4 HOURS PRN
Status: CANCELLED | OUTPATIENT
Start: 2020-08-22

## 2020-08-22 RX ORDER — LIDOCAINE HYDROCHLORIDE 20 MG/ML
20 INJECTION, SOLUTION INFILTRATION; PERINEURAL
Status: CANCELLED | OUTPATIENT
Start: 2020-08-22

## 2020-08-22 RX ORDER — ALBUTEROL SULFATE 90 UG/1
2 AEROSOL, METERED RESPIRATORY (INHALATION) EVERY 4 HOURS PRN
Status: DISCONTINUED | OUTPATIENT
Start: 2020-08-22 | End: 2020-08-31 | Stop reason: HOSPADM

## 2020-08-22 RX ORDER — HYDROXYUREA 500 MG/1
1500 CAPSULE ORAL NIGHTLY
Status: DISCONTINUED | OUTPATIENT
Start: 2020-08-22 | End: 2020-08-27

## 2020-08-22 RX ORDER — DEXTROSE MONOHYDRATE 25 G/50ML
50 INJECTION, SOLUTION INTRAVENOUS
Status: CANCELLED | OUTPATIENT
Start: 2020-08-22

## 2020-08-22 RX ORDER — MORPHINE SULFATE 4 MG/ML
2-4 INJECTION, SOLUTION INTRAMUSCULAR; INTRAVENOUS
Status: DISCONTINUED | OUTPATIENT
Start: 2020-08-22 | End: 2020-08-29

## 2020-08-22 RX ORDER — MORPHINE SULFATE 15 MG/1
15 TABLET, FILM COATED, EXTENDED RELEASE ORAL EVERY 12 HOURS
Status: DISCONTINUED | OUTPATIENT
Start: 2020-08-22 | End: 2020-08-31 | Stop reason: HOSPADM

## 2020-08-22 RX ORDER — POLYETHYLENE GLYCOL 3350 17 G/17G
1 POWDER, FOR SOLUTION ORAL 2 TIMES DAILY
Status: DISCONTINUED | OUTPATIENT
Start: 2020-08-22 | End: 2020-08-31 | Stop reason: HOSPADM

## 2020-08-22 RX ADMIN — OXYCODONE HYDROCHLORIDE 10 MG: 10 TABLET ORAL at 13:34

## 2020-08-22 RX ADMIN — GABAPENTIN 500 MG: 100 CAPSULE ORAL at 13:34

## 2020-08-22 RX ADMIN — ENOXAPARIN SODIUM 40 MG: 40 INJECTION SUBCUTANEOUS at 08:20

## 2020-08-22 RX ADMIN — SENNOSIDES-DOCUSATE SODIUM TAB 8.6-50 MG 2 TABLET: 8.6-5 TAB at 08:20

## 2020-08-22 RX ADMIN — MORPHINE SULFATE 15 MG: 15 TABLET, EXTENDED RELEASE ORAL at 08:20

## 2020-08-22 RX ADMIN — DOCUSATE SODIUM 50 MG AND SENNOSIDES 8.6 MG 2 TABLET: 8.6; 5 TABLET, FILM COATED ORAL at 20:24

## 2020-08-22 RX ADMIN — OXYCODONE HYDROCHLORIDE 10 MG: 10 TABLET ORAL at 04:44

## 2020-08-22 RX ADMIN — MORPHINE SULFATE 15 MG: 15 TABLET, FILM COATED, EXTENDED RELEASE ORAL at 20:25

## 2020-08-22 RX ADMIN — OXYCODONE HYDROCHLORIDE 15 MG: 10 TABLET ORAL at 22:51

## 2020-08-22 RX ADMIN — OXYCODONE HYDROCHLORIDE 10 MG: 10 TABLET ORAL at 10:30

## 2020-08-22 RX ADMIN — AZATHIOPRINE 50 MG: 50 TABLET ORAL at 20:24

## 2020-08-22 RX ADMIN — GABAPENTIN 500 MG: 100 CAPSULE ORAL at 08:20

## 2020-08-22 RX ADMIN — OMEPRAZOLE 20 MG: 20 CAPSULE, DELAYED RELEASE ORAL at 08:21

## 2020-08-22 RX ADMIN — THERA TABS 1 TABLET: TAB at 08:20

## 2020-08-22 RX ADMIN — GABAPENTIN 500 MG: 400 CAPSULE ORAL at 20:24

## 2020-08-22 RX ADMIN — AZATHIOPRINE 50 MG: 50 TABLET ORAL at 08:21

## 2020-08-22 RX ADMIN — OXYCODONE HYDROCHLORIDE 10 MG: 10 TABLET ORAL at 17:33

## 2020-08-22 RX ADMIN — MORPHINE SULFATE 2 MG: 4 INJECTION INTRAVENOUS at 20:32

## 2020-08-22 RX ADMIN — HYDROXYUREA 1500 MG: 500 CAPSULE ORAL at 20:24

## 2020-08-22 RX ADMIN — ASPIRIN 81 MG: 81 TABLET, COATED ORAL at 08:00

## 2020-08-22 ASSESSMENT — COGNITIVE AND FUNCTIONAL STATUS - GENERAL
WALKING IN HOSPITAL ROOM: A LITTLE
SUGGESTED CMS G CODE MODIFIER DAILY ACTIVITY: CJ
MOBILITY SCORE: 21
DAILY ACTIVITIY SCORE: 22
SUGGESTED CMS G CODE MODIFIER MOBILITY: CJ
CLIMB 3 TO 5 STEPS WITH RAILING: A LITTLE
DRESSING REGULAR LOWER BODY CLOTHING: A LITTLE
STANDING UP FROM CHAIR USING ARMS: A LITTLE
HELP NEEDED FOR BATHING: A LITTLE

## 2020-08-22 ASSESSMENT — ENCOUNTER SYMPTOMS
HEARTBURN: 0
CHILLS: 0
MYALGIAS: 1
BACK PAIN: 0
DEPRESSION: 0
VOMITING: 0
HEMOPTYSIS: 0
BRUISES/BLEEDS EASILY: 0
DIZZINESS: 0
NAUSEA: 0
COUGH: 0
DOUBLE VISION: 0
WHEEZING: 0
CLAUDICATION: 0
BLURRED VISION: 0
PALPITATIONS: 0
SINUS PAIN: 0
PND: 0
HEADACHES: 0
FEVER: 0
MYALGIAS: 0

## 2020-08-22 ASSESSMENT — LIFESTYLE VARIABLES
TOTAL SCORE: 0
EVER_SMOKED: NEVER
EVER FELT BAD OR GUILTY ABOUT YOUR DRINKING: NO
ON A TYPICAL DAY WHEN YOU DRINK ALCOHOL HOW MANY DRINKS DO YOU HAVE: 0
HAVE YOU EVER FELT YOU SHOULD CUT DOWN ON YOUR DRINKING: NO
AVERAGE NUMBER OF DAYS PER WEEK YOU HAVE A DRINK CONTAINING ALCOHOL: 0
HOW MANY TIMES IN THE PAST YEAR HAVE YOU HAD 5 OR MORE DRINKS IN A DAY: 0
EVER HAD A DRINK FIRST THING IN THE MORNING TO STEADY YOUR NERVES TO GET RID OF A HANGOVER: NO
CONSUMPTION TOTAL: NEGATIVE
ALCOHOL_USE: NO
TOTAL SCORE: 0
TOTAL SCORE: 0
HAVE PEOPLE ANNOYED YOU BY CRITICIZING YOUR DRINKING: NO

## 2020-08-22 ASSESSMENT — FIBROSIS 4 INDEX: FIB4 SCORE: 0.57

## 2020-08-22 NOTE — PROGRESS NOTES
Direct admit from [facility]. Referring/accepting is Dr. Dale for plastic surgery for Fornier's Gangrene.  Discharge and readmit orders signed and held--please release and implement upon pt arrival. Page the direct admit on call hospitalist to notify that patient has arrived & for any acute changes. Call referring/accepting MD from [facility] for any concerns related to the admission orders.  Dr. Peraza is consulting for plastic surgery. Please notify her of pts admission and location.

## 2020-08-22 NOTE — DISCHARGE INSTRUCTIONS
Discharge Instructions    Discharged to other by ambulance with self. Discharged via ambulance, hospital escort: Yes.  Special equipment needed: Not Applicable    Be sure to schedule a follow-up appointment with your primary care doctor or any specialists as instructed.     Discharge Plan:        I understand that a diet low in cholesterol, fat, and sodium is recommended for good health. Unless I have been given specific instructions below for another diet, I accept this instruction as my diet prescription.   Other diet: Diabetic    Special Instructions: None    · Is patient discharged on Warfarin / Coumadin?   No     Depression / Suicide Risk    As you are discharged from this [facility] facility, it is important to learn how to keep safe from harming yourself.    Recognize the warning signs:  · Abrupt changes in personality, positive or negative- including increase in energy   · Giving away possessions  · Change in eating patterns- significant weight changes-  positive or negative  · Change in sleeping patterns- unable to sleep or sleeping all the time   · Unwillingness or inability to communicate  · Depression  · Unusual sadness, discouragement and loneliness  · Talk of wanting to die  · Neglect of personal appearance   · Rebelliousness- reckless behavior  · Withdrawal from people/activities they love  · Confusion- inability to concentrate     If you or a loved one observes any of these behaviors or has concerns about self-harm, here's what you can do:  · Talk about it- your feelings and reasons for harming yourself  · Remove any means that you might use to hurt yourself (examples: pills, rope, extension cords, firearm)  · Get professional help from the community (Mental Health, Substance Abuse, psychological counseling)  · Do not be alone:Call your Safe Contact- someone whom you trust who will be there for you.  · Call your local CRISIS HOTLINE 915-1790 or 058-400-4216  · Call your local Children's Mobile  Crisis Response Team [place] (144) 257-8176 or www.Aviary.Entomo  · Call the toll free National Suicide Prevention Hotlines   · National Suicide Prevention Lifeline 765-172-DKQI (0143)  · National Hope Line Network 800-SUICIDE (948-8180)

## 2020-08-22 NOTE — DISCHARGE PLANNING
Received Transport Form @ 6368  Spoke to Lula @ [facility]    Transport is scheduled for 8/22 @1400 going to [facility] T424.    PATRICA Greene notified.

## 2020-08-22 NOTE — DISCHARGE PLANNING
Notified by MD pt will be transferred to [facility]. Pt will be going to T424.     REMSA form completed and faxed    COBRA completed and placed in chart. Pt states daughter is aware of transfer.

## 2020-08-22 NOTE — PROGRESS NOTES
Gave report to Alethea JORGENSEN. Pt to be transported to [facility] [place] at 1400.

## 2020-08-22 NOTE — PROGRESS NOTES
"[facility] [place]    Progress Note    Service: [facility] [place]  Patient's Name: Francesco Schulte  MRN: [id number]  Admit Date:7/25/2020  Today's Date: 8/22/2020   Room #: 2209/01      Identification:  70 y.o. male with kira's gangrene and multiple washouts     Overnight-Interval events:   - none Subjective/ROS:   Patient seen and examined. Continues to complain of increased pain lateral right testicle and groin. Andres in place and draining  No CP/SOB/F/C     Physical Exam:  Current Vitals:   /71   Pulse 67   Temp 36.4 °C (97.6 °F) (Oral)   Resp 20   Ht 1.88 m (6' 2\")   Wt 92.3 kg (203 lb 7.8 oz)   SpO2 97%   BMI 26.13 kg/m²     08/20 1900 - 08/22 0659  In: 1320 [P.O.:1320]  Out: 3475 [Urine:3425; Drains:50]   GEN : NAD, A&O X4   RES:  no acute respiratory distress  ABD: Soft ND,   :   Andres in place, dark yellow output, right hemiscrotum, clean margins, no signs of necrosis or skin break down. Good granulation tissues, moderate tenderness right lateral superior groin, testicle. No edema.   EXT:  No edema, SCD's in place     Labs:   Lab Results   Component Value Date/Time    WBC 6.0 08/22/2020 04:36 AM    HEMATOCRIT 39.1 (L) 08/22/2020 04:36 AM    SODIUM 140 08/18/2020 04:30 AM    POTASSIUM 3.8 08/18/2020 04:30 AM    CHLORIDE 105 08/18/2020 04:30 AM    CO2 25 08/18/2020 04:30 AM    BUN 10 08/18/2020 04:30 AM    CREATININE 0.82 08/18/2020 04:30 AM    CALCIUM 8.7 08/18/2020 04:30 AM    MAGNESIUM 2.0 08/18/2020 04:30 AM        Lab Results   Component Value Date/Time    GLUCOSE 157 (H) 08/18/2020 04:30 AM    GLUCOSE 167 (H) 08/17/2020 04:02 AM    GLUCOSE 168 (H) 08/16/2020 04:17 AM    GLUCOSE 145 (H) 08/15/2020 04:08 AM          Assessment/Plan  Francesco Schulte is a 70 y.o. male with kira's gangrene s/p multiple washouts.    - regular diet today  - npo at midnight for possible debridement tomorrow   - manage pain  - plastic reconstructive consulted, patient will need to be transferred to " [facility] for this evaluation  - urology will follow clinically     Joe Graff PA-C  [facility] [place]

## 2020-08-22 NOTE — PROGRESS NOTES
Patient seen and examined.  Has lateral pain on the right hemiscrotum and the IV antibiotics were recently stopped.  Wound has excellent bed of granulation tissue on the testicle and upper scrotum is indurated and tender but no necrosis or evidence of cellulitis.  Discussed with the patient the need to look at STSG for closure of the wound.

## 2020-08-22 NOTE — PROGRESS NOTES
Report received from day RN.  Pt reports 8/10 pain in the scrotum. Medicated with oxycodone per pt's request. Pt on 1.5L O2 NC. SCDs applied to bilat calf. Taught to call when in need of assistance. POC discussed with pt including pain management, diet, BG monitoring, safety, importance of mobility, and infection control. Pt verbalized understanding. No other needs at this time.

## 2020-08-22 NOTE — PROGRESS NOTES
Transferring patient to [facility] per physician order.  Discharged with self on gurney via [facility]. Verbalized understanding of transfer. Ambulating with standby assistance and FWW, go in place, pain well controlled, tolerating oral medications, oxygen saturation greater than 90% on room air, tolerating diet.  All questions answered.  Belongings with patient at time of transfer.

## 2020-08-22 NOTE — PROGRESS NOTES
Received report from noc shift nurse. Assumed pt care at 0715. Pt is A&Ox4, resting comfortably in bed. Pt on 2L oxygen via nasal cannula. No signs of SOB/respiratory distress. Labs noted, VSS. Pt appears comfortable, sleeping with even and unlabored respirations. Will return for morning meds and assessment. Fall precautions in place. Bed at lowest position. Call light and personal belongings within reach. Continue to monitor

## 2020-08-22 NOTE — DISCHARGE SUMMARY
Discharge Summary    CHIEF COMPLAINT ON ADMISSION  Chief Complaint   Patient presents with   • Chest Pain   • Testicle Pain       Reason for Admission  Chest pain/Testicle pain     CODE STATUS  Full Code    HPI & HOSPITAL COURSE  Mr. Schulte is a 70-year-old male with PMH significant for polycythemia, lupus, CAD with MI x 3 and in-stent restenosis, TIA's, chronic systolic CHF, PAF on no rate control medication chronically anticoagulated with apixaban, right CEA, and DM 2 who presented 7/25/2020 with scrotal pain and swelling.     He was found to have right scrotal abscess and Landon's gangrene. Urology was emergently consulted and patient underwent washout on 7/25 with 3 more subsequent washouts on 7/29, 7/31, and 8/3 with wound VAC placement. Culture grew Abigail albicans. Infectious disease was consulted and recommended treating with Unasyn and fluconazole. He completed course of antibiotics on 8/17.    He had acute on chronic flare of his polycythemia. Hematology was consulted and increased his Hydrea on 8/18. Platelets have been trending down.  Hematology decreased Hydrea dosing 8/22 to avoid too rapid reduction platelets (he reports this happened in the past).    Wound VAC change 8/21, found area of epibole along indurated area to right upper groin/wound edge with right wound edge closed over. Patient also complained about increased pain with radiation of his right groin. Urology was reconsulted and suggest wound has excellent bed of granulation tissue on the testicle and upper scrotum which is indurated and tender but no necrosis or evidence of cellulitis and do not recommend another washout at this time. Urology recommends plastic consultation for possible STSG for closure of the wound.  I discussed case with Dr. Peraza from plastic surgery who is agreeable to see the patient, however does not have privileges at [facility] and requests patient be transferred to [facility] for higher level of care.    We  have held his Eliquis in anticipation of potential surgery in the next 48 hours. Started on Lovenox.  Restart Eliquis per surgery recommendations.    Patient seen and examined prior to discharge. He has ongoing scrotal pain with radiation up his right groin that has been managed with Gabapentin, MS contin, PRN oxycodone, and IV Morphine. He is A/O x 4 and aware of transfer when bed available. He asked me to call and update his family and I did this.     Therefore, he is discharged in good and stable condition to higher-level of care inpatient facility.    The patient met 2-midnight criteria for an inpatient stay at the time of discharge.    DISCHARGE DIAGNOSES  Principal Problem:    Landon's gangrene of scrotum POA: Yes  Active Problems:    Stenosis of right carotid artery-Right CEA 3/21/18 POA: Yes      Overview: 90% right carotid stenosis      Right CEA 3/21      Benny Morfin MD - vascular surgery    Polycythemia POA: Yes    Paroxysmal A-fib (HCC) POA: Yes    Chronic systolic CHF (congestive heart failure) (HCC) POA: Yes    Type 2 diabetes mellitus, with long-term current use of insulin (HCC) POA: Yes      Overview: Chronic condition treated with Glipizide and Lantus.      Sliding scale insulin during acute hospitalization.          CAD (coronary artery disease) POA: Yes    Lupus (HCC) POA: Yes    SLE (systemic lupus erythematosus related syndrome) (HCC) (Chronic) POA: Yes    Pancreatic cyst POA: Unknown    Age-related physical debility POA: Unknown    Constipation POA: Unknown    TIA (transient ischemic attack) POA: Unknown  Resolved Problems:    Thrombocytopenia (HCC) POA: Yes      MEDICATIONS ON DISCHARGE     Medication List      ASK your doctor about these medications      Instructions   apixaban 5mg Tabs  Commonly known as: Eliquis   Take 1 Tab by mouth 2 Times a Day.  Dose: 5 mg     aspirin EC 81 MG Tbec  Commonly known as: ECOTRIN   Take 1 Tab by mouth every day.  Dose: 81 mg     azaTHIOprine 50 MG  "Tabs  Commonly known as: IMURAN   Take 50 mg by mouth 2 Times a Day. TAKE 1 TABLET BY MOUTH TWICE A DAY  Dose: 50 mg     finasteride 5 MG Tabs  Commonly known as: PROSCAR   Take 5 mg by mouth every evening.  Dose: 5 mg     hydroxyurea 500 MG Caps  Commonly known as: HYDREA   Take 500 mg by mouth See Admin Instructions. Alternating doses  Take 500 mg once daily then the next day takes 500 mg twice daily  Then repeat  Dose: 500 mg     midodrine 5 MG Tabs  Commonly known as: PROAMATINE   Take 15 mg by mouth every evening.  Dose: 15 mg     naproxen 250 MG Tabs  Commonly known as: NAPROSYN   Take 750 mg by mouth 2 times a day as needed. Indications: Pain  Dose: 750 mg     omeprazole 20 MG delayed-release capsule  Commonly known as: PRILOSEC   Take 1 Cap by mouth every day.  Dose: 20 mg     predniSONE 20 MG Tabs  Commonly known as: DELTASONE   Take 40 mg by mouth every day.  Dose: 40 mg     tamsulosin 0.4 MG capsule  Commonly known as: FLOMAX   Take 0.8 mg by mouth every evening.  Dose: 0.8 mg     therapeutic multivitamin-minerals Tabs   Take 1 Tab by mouth every day.  Dose: 1 Tab     Tresiba FlexTouch 200 UNIT/ML Sopn  Generic drug: Insulin Degludec   Inject 18 Units as instructed every evening.  Dose: 18 Units            Allergies  Allergies   Allergen Reactions   • Doxycycline      Swelling lips and difficulties swallowing   • Beta Adrenergic Blockers Unspecified     dizziness   • Metformin Unspecified and Palpitations     Cardiac effects  \"Similar to a heart attack, I was hospitalized\"   • Simvastatin      Other reaction(s): Other (Specify with Comments)  Myalgias  Myalgias   • Statins [Hmg-Coa-R Inhibitors]      Muscle ache, joint pain       DIET  Orders Placed This Encounter   Procedures   • Diet Order Diabetic     Standing Status:   Standing     Number of Occurrences:   1     Order Specific Question:   Diet:     Answer:   Diabetic [3]       ACTIVITY  As tolerated.  Weight bearing as tolerated    LINES, DRAINS, AND " WOUNDS  This is an automated list. Peripheral IVs will be removed prior to discharge.  Midline IV 08/02/20 Left;Medial Upper arm (Active)   Site Assessment Clean;Dry;Intact 08/21/20 1938   Dressing Type Biopatch;Occlusive;Transparent 08/21/20 1938   Line Status Saline locked 08/21/20 1938   Dressing Status Clean;Dry;Intact 08/21/20 1938   Dressing Intervention N/A 08/21/20 1938   Dressing Change Due 08/22/20 08/21/20 1938   Date Primary Tubing Changed 08/15/20 08/16/20 1930   Date Secondary Tubing Changed 08/15/20 08/16/20 1930   NEXT Primary Tubing Change  08/18/20 08/16/20 1930   NEXT Secondary Tubing Change  08/16/20 08/16/20 1930   Infiltration Grading ([facility], [facility]) 0 08/21/20 1938   Phlebitis Scale ([facility] Only) 0 08/21/20 1938     Urethral Catheter Latex (Active)   Site Assessment Clean;Skin intact 08/21/20 1938   Collection Container Standard drainage bag 08/21/20 1938   Urinary Catheter Care Drainage Bag Not Overfilled 08/21/20 1938   Securement Method Securing device (Describe) 08/21/20 1938   Output (mL) 700 mL 08/22/20 0542      Wound 07/25/20 Full Thickness Wound Scrotum Right (Active)   Wound Image   08/17/20 1430   Site Assessment Red;Early/partial granulation;Painful;Closed Wound Edges 08/21/20 1300   Periwound Assessment Intact;Painful 08/21/20 1300   Margins Attached edges;Defined edges;Epibole (rolled edges) 08/21/20 1300   Closure Secondary intention 08/21/20 1300   Drainage Amount Scant 08/21/20 1300   Drainage Description Serosanguineous 08/21/20 1300   Treatments Cleansed;Site care 08/21/20 1300   Wound Cleansing Normal Saline Irrigation 08/21/20 1938   Periwound Protectant Benzoin;Paste Ring 08/21/20 1938   Dressing Cleansing/Solutions Normal Saline 08/21/20 1938   Dressing Options Moist Roll Gauze;Absorbent Abdominal Pad 08/21/20 1938   Dressing Changed Observed 08/21/20 1938   Dressing Status Clean;Dry;Intact 08/21/20 1938   Dressing Change/Treatment Frequency Every Shift, and As Needed  08/21/20 1938   NEXT Dressing Change/Treatment Date 08/22/20 08/21/20 1938   NEXT Weekly Photo (Inpatient Only) 08/24/20 08/21/20 1938   Non-staged Wound Description Full thickness 08/21/20 1300   Wound Length (cm) 11 cm 08/17/20 1430   Wound Width (cm) 5 cm 08/17/20 1430   Wound Depth (cm) 4.3 cm 08/17/20 1430   Wound Surface Area (cm^2) 55 cm^2 08/17/20 1430   Wound Volume (cm^3) 236.5 cm^3 08/17/20 1430   Wound Healing % -431 08/17/20 1430   Wound Bed Granulation (%) 100 % 08/17/20 1430   Wound Bed Slough (%) 0 % 08/17/20 1430   Wound Bed Eschar (%) 0 % 08/17/20 1430   Tunneling (cm) 3 cm 08/17/20 1430   Tunneling Clock Position of Wound 11 08/17/20 1430   Tunneling - 2nd Location (cm) 10 cm 08/03/20 1430   Tunneling Clock Position of Wound - 2nd Location 11 08/03/20 1430   Undermining (cm) 4.3 cm 07/29/20 1600   Undermining of Wound, 1st Location From 9 o'clock;To 10 o'clock 07/29/20 1600   Undermining (cm) - 2nd location 3.4 cm 07/27/20 1900   Undermining of Wound, 2nd Location From 2 o'clock;To 3 o'clock 07/27/20 1900   Shape Irregular 08/21/20 1300   Wound Odor None 08/21/20 1300   Pulses N/A 08/21/20 1300   Exposed Structures Other (Comments) 08/17/20 1430   WOUND NURSE ONLY - Time Spent with Patient (mins) 90 08/21/20 1300              Urethral Catheter Latex (Active)   Site Assessment Clean;Skin intact 08/21/20 1938   Collection Container Standard drainage bag 08/21/20 1938   Urinary Catheter Care Drainage Bag Not Overfilled 08/21/20 1938   Securement Method Securing device (Describe) 08/21/20 1938   Output (mL) 700 mL 08/22/20 0542        MENTAL STATUS ON TRANSFER  Level of Consciousness: Alert  Orientation : Oriented x 4  Speech: Speech Clear    CONSULTATIONS  Urology  Palliative Care  Hematology  Infectious disease    PROCEDURES  7/25 -I&D right scrotum  7/29 -I&D right scrotum  7/31 -I&D right scrotum    LABORATORY  Lab Results   Component Value Date    SODIUM 140 08/18/2020    POTASSIUM 3.8 08/18/2020     CHLORIDE 105 08/18/2020    CO2 25 08/18/2020    GLUCOSE 157 (H) 08/18/2020    BUN 10 08/18/2020    CREATININE 0.82 08/18/2020        Lab Results   Component Value Date    WBC 6.0 08/22/2020    HEMOGLOBIN 11.7 (L) 08/22/2020    HEMATOCRIT 39.1 (L) 08/22/2020    PLATELETCT 809 (H) 08/22/2020        Total time of the discharge process exceeds 45 minutes.    I have performed a physical exam and reviewed and updated ROS and Assessment/Plan today (08/22/20). In review of the previous note there are no changes except as documented above    Please note that this dictation was created using voice recognition software. I have made every reasonable attempt to correct obvious errors, but there may be errors of grammar and possibly content that I did not discover before finalizing the note.    CONNER Roberts.

## 2020-08-22 NOTE — PROGRESS NOTES
Oncology/Hematology Progress Note               Author: Viviana Lopez M.D. Date & Time created: 8/22/2020  9:30 AM     Interval History:  No acute events overnight. Pt reports he might be having another surgery soon. He feels better now that his counts are improved. He was very anxious about them getting too high. No fevers, cough, SOB.     Review of Systems:  Review of Systems   Musculoskeletal: Positive for myalgias.   All other systems reviewed and are negative.      Physical Exam:  Physical Exam  Constitutional:       General: He is not in acute distress.     Appearance: Normal appearance.   HENT:      Head: Normocephalic and atraumatic.      Right Ear: External ear normal.      Left Ear: External ear normal.      Nose: Nose normal. No congestion.   Eyes:      General: No scleral icterus.     Conjunctiva/sclera: Conjunctivae normal.   Neck:      Musculoskeletal: Neck supple. No muscular tenderness.   Cardiovascular:      Rate and Rhythm: Normal rate and regular rhythm.   Pulmonary:      Effort: Pulmonary effort is normal. No respiratory distress.      Breath sounds: Normal breath sounds. No wheezing or rales.   Abdominal:      General: Abdomen is flat. Bowel sounds are normal. There is no distension.      Palpations: Abdomen is soft.      Tenderness: There is no abdominal tenderness.   Musculoskeletal:         General: No swelling or tenderness.   Neurological:      General: No focal deficit present.      Mental Status: He is alert and oriented to person, place, and time.   Psychiatric:         Mood and Affect: Mood normal.         Behavior: Behavior normal.         Thought Content: Thought content normal.         Judgment: Judgment normal.         Labs:          No results for input(s): SODIUM, POTASSIUM, CHLORIDE, CO2, BUN, CREATININE, MAGNESIUM, PHOSPHORUS, CALCIUM in the last 72 hours.  No results for input(s): ALTSGPT, ASTSGOT, ALKPHOSPHAT, TBILIRUBIN, DBILIRUBIN, GAMMAGT, AMYLASE, LIPASE, ALB,  PREALBUMIN, GLUCOSE in the last 72 hours.  Recent Labs     20  0331 20  0436 20  0436   RBC 4.74 4.68* 4.78   HEMOGLOBIN 11.7* 11.5* 11.7*   HEMATOCRIT 39.4* 38.7* 39.1*   PLATELETCT 932* 875* 809*     Recent Labs     20  0331 20  0436 20  0436   WBC 7.3 6.3 6.0   NEUTSPOLYS 76.40* 77.20* 87.00*   LYMPHOCYTES 11.80* 11.90* 6.00*   MONOCYTES 5.50 4.90 3.00   EOSINOPHILS 3.60 3.80 2.00   BASOPHILS 1.60 1.40 1.00         Hemodynamics:  Temp (24hrs), Av.5 °C (97.7 °F), Min:36.4 °C (97.6 °F), Max:36.7 °C (98 °F)  Temperature: 36.4 °C (97.6 °F)  Pulse  Av.8  Min: 60  Max: 110   Blood Pressure : 124/71     Respiratory:    Respiration: 20, Pulse Oximetry: 97 %        RUL Breath Sounds: Clear, RML Breath Sounds: Diminished, RLL Breath Sounds: Diminished, EVELINA Breath Sounds: Clear, LLL Breath Sounds: Diminished  Fluids:    Intake/Output Summary (Last 24 hours) at 2020 0930  Last data filed at 2020 0818  Gross per 24 hour   Intake 960 ml   Output 1775 ml   Net -815 ml        GI/Nutrition:  Orders Placed This Encounter   Procedures   • Diet Order Diabetic     Standing Status:   Standing     Number of Occurrences:   1     Order Specific Question:   Diet:     Answer:   Diabetic [3]     Medical Decision Making, by Problem:  Active Hospital Problems    Diagnosis   • *Landon's gangrene of scrotum [N49.3]   • Stenosis of right carotid artery-Right CEA 3/21/18 [I65.21]   • Type 2 diabetes mellitus, with long-term current use of insulin ([facility]) [E11.9, Z79.4]   • CAD (coronary artery disease) [I25.10]   • Chronic systolic CHF (congestive heart failure) ([facility]) [I50.22]   • Paroxysmal A-fib ([facility]) [I48.0]   • Polycythemia [D75.1]   • TIA (transient ischemic attack) [G45.9]   • Constipation [K59.00]   • Age-related physical debility [R54]   • SLE (systemic lupus erythematosus related syndrome) (HCC) [M32.9]   • Pancreatic cyst [K86.2]   • Lupus (HCC) [M32.9]       Assessment and  Plan:    # 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 As an outpatient he had been relatively stable on hydrea. This was stopped on admission and his counts increased. His thrombocytosis could also be multifactorial  with strongly inflammatory component given recent infection. He was re-started on Hydrea 2500 mg po daily on Monday and his platelets have improved to 809. WBC stable, H/H stable. Reviewed with patient will decrease Hydrea to 1500 mg po daily to avoid any issues with myelosuppression. He is having no complications as a result of his thrombocytosis at this time.  - Decrease Hydrea to 1500 mg po daily  - Monitor CBC daily  - He is on ASA    # Mild Iron Deficiency - hemoglobin stable around 11.7.  - started on po iron supplementation  - monitor    # Landon's Gangrene of the scrotum  - s/p multiple washouts, wound vac in place  - On IV antibiotics per primary team    Patient is of high medical complexity, has a high risk for complications, morbidity, and mortality.    Will continue to follow counts, but likely not round on a daily basis, call with any questions or concerns.    Quality-Core Measures

## 2020-08-22 NOTE — CARE PLAN
Problem: Venous Thromboembolism (VTW)/Deep Vein Thrombosis (DVT) Prevention:  Goal: Patient will participate in Venous Thrombosis (VTE)/Deep Vein Thrombosis (DVT)Prevention Measures  Outcome: PROGRESSING AS EXPECTED   SCDs applied to pt's bilat calf. Pt educated the importance of mobilization and encourage to mobilize mor often as tolerated. Pt verbalized understanding.    Problem: Safety  Goal: Will remain free from injury  Outcome: PROGRESSING AS EXPECTED   Pt educated to call when in need. Call light and personal belongings w/n reach. Room free of clutters. Bed locked and in lowest position. Answer call light immediately.    Problem: Pain Management  Goal: Pain level will decrease to patient's comfort goal  Outcome: PROGRESSING AS EXPECTED  Patient medicated per MD orders (MS contin scheduled and oxycodone PRN).  Encouraged the use of non-pharm measures of pain relief and to inform RN if pain is greater than comfort level.

## 2020-08-22 NOTE — PROGRESS NOTES
Direct admit from [facility], report recieved  Assessment complete.  A&O x 4. Patient calls appropriately.  Patient ambulates with SB assist and FWW. Bed alarm on   Patient has 6/10 pain. Pain managed with prescribed medications.  Denies N&V. Tolerating diabetic diet.  Dressing in place to scrotum, CDI.   + void via go, - flatus, + BM.  Patient denies SOB.  SCD's on.    Review plan with of care with patient. Call light and personal belongings with in reach. Hourly rounding in place. All needs met at this time.

## 2020-08-23 ENCOUNTER — ANESTHESIA (OUTPATIENT)
Dept: SURGERY | Facility: MEDICAL CENTER | Age: 70
DRG: 577 | End: 2020-08-23
Payer: MEDICARE

## 2020-08-23 ENCOUNTER — ANESTHESIA EVENT (OUTPATIENT)
Dept: SURGERY | Facility: MEDICAL CENTER | Age: 70
DRG: 577 | End: 2020-08-23
Payer: MEDICARE

## 2020-08-23 LAB
ANISOCYTOSIS BLD QL SMEAR: ABNORMAL
BASOPHILS # BLD AUTO: 1.9 % (ref 0–1.8)
BASOPHILS # BLD: 0.09 K/UL (ref 0–0.12)
COMMENT 1642: NORMAL
COVID ORDER STATUS COVID19: NORMAL
DACRYOCYTES BLD QL SMEAR: NORMAL
EOSINOPHIL # BLD AUTO: 0.18 K/UL (ref 0–0.51)
EOSINOPHIL NFR BLD: 3.8 % (ref 0–6.9)
ERYTHROCYTE [DISTWIDTH] IN BLOOD BY AUTOMATED COUNT: 55.7 FL (ref 35.9–50)
EXPOSED MRN EXMRN: NORMAL
GLUCOSE BLD-MCNC: 113 MG/DL (ref 65–99)
GLUCOSE BLD-MCNC: 130 MG/DL (ref 65–99)
GLUCOSE BLD-MCNC: 130 MG/DL (ref 65–99)
GLUCOSE BLD-MCNC: 98 MG/DL (ref 65–99)
HBV SURFACE AG SER QL: NORMAL
HCT VFR BLD AUTO: 42.2 % (ref 42–52)
HCV AB SER QL: NORMAL
HGB BLD-MCNC: 12.4 G/DL (ref 14–18)
HIV 1+2 AB+HIV1 P24 AG SERPL QL IA: NORMAL
HYPOCHROMIA BLD QL SMEAR: ABNORMAL
IMM GRANULOCYTES # BLD AUTO: 0.02 K/UL (ref 0–0.11)
IMM GRANULOCYTES NFR BLD AUTO: 0.4 % (ref 0–0.9)
LG PLATELETS BLD QL SMEAR: NORMAL
LYMPHOCYTES # BLD AUTO: 0.68 K/UL (ref 1–4.8)
LYMPHOCYTES NFR BLD: 14.3 % (ref 22–41)
MACROCYTES BLD QL SMEAR: ABNORMAL
MCH RBC QN AUTO: 24.8 PG (ref 27–33)
MCHC RBC AUTO-ENTMCNC: 29.4 G/DL (ref 33.7–35.3)
MCV RBC AUTO: 84.4 FL (ref 81.4–97.8)
MICROCYTES BLD QL SMEAR: ABNORMAL
MONOCYTES # BLD AUTO: 0.25 K/UL (ref 0–0.85)
MONOCYTES NFR BLD AUTO: 5.3 % (ref 0–13.4)
MORPHOLOGY BLD-IMP: NORMAL
NEUTROPHILS # BLD AUTO: 3.54 K/UL (ref 1.82–7.42)
NEUTROPHILS NFR BLD: 74.3 % (ref 44–72)
NRBC # BLD AUTO: 0 K/UL
NRBC BLD-RTO: 0 /100 WBC
OVALOCYTES BLD QL SMEAR: NORMAL
PLATELET # BLD AUTO: 786 K/UL (ref 164–446)
PMV BLD AUTO: 10.6 FL (ref 9–12.9)
POIKILOCYTOSIS BLD QL SMEAR: NORMAL
POLYCHROMASIA BLD QL SMEAR: NORMAL
RBC # BLD AUTO: 5 M/UL (ref 4.7–6.1)
RBC BLD AUTO: PRESENT
SARS-COV-2 RDRP RESP QL NAA+PROBE: NOTDETECTED
SPECIMEN SOURCE: NORMAL
WBC # BLD AUTO: 4.8 K/UL (ref 4.8–10.8)

## 2020-08-23 PROCEDURE — 700111 HCHG RX REV CODE 636 W/ 250 OVERRIDE (IP): Performed by: ANESTHESIOLOGY

## 2020-08-23 PROCEDURE — 501445 HCHG STAPLER, SKIN DISP: Performed by: SURGERY

## 2020-08-23 PROCEDURE — 82962 GLUCOSE BLOOD TEST: CPT

## 2020-08-23 PROCEDURE — 700102 HCHG RX REV CODE 250 W/ 637 OVERRIDE(OP): Performed by: NURSE PRACTITIONER

## 2020-08-23 PROCEDURE — 36415 COLL VENOUS BLD VENIPUNCTURE: CPT

## 2020-08-23 PROCEDURE — A9270 NON-COVERED ITEM OR SERVICE: HCPCS | Performed by: ANESTHESIOLOGY

## 2020-08-23 PROCEDURE — 700111 HCHG RX REV CODE 636 W/ 250 OVERRIDE (IP): Performed by: SURGERY

## 2020-08-23 PROCEDURE — U0004 COV-19 TEST NON-CDC HGH THRU: HCPCS

## 2020-08-23 PROCEDURE — 700111 HCHG RX REV CODE 636 W/ 250 OVERRIDE (IP): Performed by: NURSE PRACTITIONER

## 2020-08-23 PROCEDURE — 160048 HCHG OR STATISTICAL LEVEL 1-5: Performed by: SURGERY

## 2020-08-23 PROCEDURE — 501838 HCHG SUTURE GENERAL: Performed by: SURGERY

## 2020-08-23 PROCEDURE — 160002 HCHG RECOVERY MINUTES (STAT): Performed by: SURGERY

## 2020-08-23 PROCEDURE — 0HRAX74 REPLACEMENT OF INGUINAL SKIN WITH AUTOLOGOUS TISSUE SUBSTITUTE, PARTIAL THICKNESS, EXTERNAL APPROACH: ICD-10-PCS | Performed by: SURGERY

## 2020-08-23 PROCEDURE — A9270 NON-COVERED ITEM OR SERVICE: HCPCS | Performed by: NURSE PRACTITIONER

## 2020-08-23 PROCEDURE — 770001 HCHG ROOM/CARE - MED/SURG/GYN PRIV*

## 2020-08-23 PROCEDURE — 160009 HCHG ANES TIME/MIN: Performed by: SURGERY

## 2020-08-23 PROCEDURE — A9270 NON-COVERED ITEM OR SERVICE: HCPCS | Performed by: SURGERY

## 2020-08-23 PROCEDURE — 160035 HCHG PACU - 1ST 60 MINS PHASE I: Performed by: SURGERY

## 2020-08-23 PROCEDURE — 160028 HCHG SURGERY MINUTES - 1ST 30 MINS LEVEL 3: Performed by: SURGERY

## 2020-08-23 PROCEDURE — 99232 SBSQ HOSP IP/OBS MODERATE 35: CPT | Performed by: INTERNAL MEDICINE

## 2020-08-23 PROCEDURE — 160036 HCHG PACU - EA ADDL 30 MINS PHASE I: Performed by: SURGERY

## 2020-08-23 PROCEDURE — 85025 COMPLETE CBC W/AUTO DIFF WBC: CPT

## 2020-08-23 PROCEDURE — 700102 HCHG RX REV CODE 250 W/ 637 OVERRIDE(OP): Performed by: ANESTHESIOLOGY

## 2020-08-23 PROCEDURE — 700102 HCHG RX REV CODE 250 W/ 637 OVERRIDE(OP): Performed by: SURGERY

## 2020-08-23 PROCEDURE — A6223 GAUZE >16<=48 NO W/SAL W/O B: HCPCS | Performed by: SURGERY

## 2020-08-23 PROCEDURE — 700101 HCHG RX REV CODE 250: Performed by: SURGERY

## 2020-08-23 PROCEDURE — C9803 HOPD COVID-19 SPEC COLLECT: HCPCS | Performed by: SURGERY

## 2020-08-23 PROCEDURE — 0HBHXZZ EXCISION OF RIGHT UPPER LEG SKIN, EXTERNAL APPROACH: ICD-10-PCS | Performed by: SURGERY

## 2020-08-23 PROCEDURE — 160039 HCHG SURGERY MINUTES - EA ADDL 1 MIN LEVEL 3: Performed by: SURGERY

## 2020-08-23 RX ORDER — OXYCODONE HCL 5 MG/5 ML
10 SOLUTION, ORAL ORAL
Status: COMPLETED | OUTPATIENT
Start: 2020-08-23 | End: 2020-08-23

## 2020-08-23 RX ORDER — HYDROMORPHONE HYDROCHLORIDE 1 MG/ML
0.2 INJECTION, SOLUTION INTRAMUSCULAR; INTRAVENOUS; SUBCUTANEOUS
Status: DISCONTINUED | OUTPATIENT
Start: 2020-08-23 | End: 2020-08-23 | Stop reason: HOSPADM

## 2020-08-23 RX ORDER — ONDANSETRON 2 MG/ML
INJECTION INTRAMUSCULAR; INTRAVENOUS PRN
Status: DISCONTINUED | OUTPATIENT
Start: 2020-08-23 | End: 2020-08-23 | Stop reason: SURG

## 2020-08-23 RX ORDER — HYDROMORPHONE HYDROCHLORIDE 1 MG/ML
0.4 INJECTION, SOLUTION INTRAMUSCULAR; INTRAVENOUS; SUBCUTANEOUS
Status: DISCONTINUED | OUTPATIENT
Start: 2020-08-23 | End: 2020-08-23 | Stop reason: HOSPADM

## 2020-08-23 RX ORDER — DIPHENHYDRAMINE HYDROCHLORIDE 50 MG/ML
12.5 INJECTION INTRAMUSCULAR; INTRAVENOUS
Status: DISCONTINUED | OUTPATIENT
Start: 2020-08-23 | End: 2020-08-23 | Stop reason: HOSPADM

## 2020-08-23 RX ORDER — EPINEPHRINE 1 MG/ML(1)
AMPUL (ML) INJECTION
Status: DISCONTINUED | OUTPATIENT
Start: 2020-08-23 | End: 2020-08-23 | Stop reason: HOSPADM

## 2020-08-23 RX ORDER — SODIUM CHLORIDE, SODIUM LACTATE, POTASSIUM CHLORIDE, CALCIUM CHLORIDE 600; 310; 30; 20 MG/100ML; MG/100ML; MG/100ML; MG/100ML
INJECTION, SOLUTION INTRAVENOUS CONTINUOUS
Status: DISCONTINUED | OUTPATIENT
Start: 2020-08-23 | End: 2020-08-23 | Stop reason: HOSPADM

## 2020-08-23 RX ORDER — HYDROMORPHONE HYDROCHLORIDE 1 MG/ML
0.1 INJECTION, SOLUTION INTRAMUSCULAR; INTRAVENOUS; SUBCUTANEOUS
Status: DISCONTINUED | OUTPATIENT
Start: 2020-08-23 | End: 2020-08-23 | Stop reason: HOSPADM

## 2020-08-23 RX ORDER — CEFAZOLIN SODIUM 1 G/3ML
INJECTION, POWDER, FOR SOLUTION INTRAMUSCULAR; INTRAVENOUS PRN
Status: DISCONTINUED | OUTPATIENT
Start: 2020-08-23 | End: 2020-08-23 | Stop reason: SURG

## 2020-08-23 RX ORDER — OXYCODONE HCL 5 MG/5 ML
5 SOLUTION, ORAL ORAL
Status: COMPLETED | OUTPATIENT
Start: 2020-08-23 | End: 2020-08-23

## 2020-08-23 RX ORDER — MAGNESIUM CARB/ALUMINUM HYDROX 105-160MG
TABLET,CHEWABLE ORAL
Status: DISCONTINUED | OUTPATIENT
Start: 2020-08-23 | End: 2020-08-23 | Stop reason: HOSPADM

## 2020-08-23 RX ORDER — ONDANSETRON 2 MG/ML
4 INJECTION INTRAMUSCULAR; INTRAVENOUS
Status: DISCONTINUED | OUTPATIENT
Start: 2020-08-23 | End: 2020-08-23 | Stop reason: HOSPADM

## 2020-08-23 RX ORDER — LIDOCAINE HYDROCHLORIDE AND EPINEPHRINE 10; 10 MG/ML; UG/ML
INJECTION, SOLUTION INFILTRATION; PERINEURAL
Status: DISCONTINUED | OUTPATIENT
Start: 2020-08-23 | End: 2020-08-23 | Stop reason: HOSPADM

## 2020-08-23 RX ORDER — HALOPERIDOL 5 MG/ML
1 INJECTION INTRAMUSCULAR
Status: DISCONTINUED | OUTPATIENT
Start: 2020-08-23 | End: 2020-08-23 | Stop reason: HOSPADM

## 2020-08-23 RX ADMIN — PROPOFOL 200 MG: 10 INJECTION, EMULSION INTRAVENOUS at 16:49

## 2020-08-23 RX ADMIN — GABAPENTIN 500 MG: 400 CAPSULE ORAL at 21:52

## 2020-08-23 RX ADMIN — OXYCODONE HYDROCHLORIDE 10 MG: 5 SOLUTION ORAL at 19:04

## 2020-08-23 RX ADMIN — CEFAZOLIN 2 G: 330 INJECTION, POWDER, FOR SOLUTION INTRAMUSCULAR; INTRAVENOUS at 17:06

## 2020-08-23 RX ADMIN — FENTANYL CITRATE 50 MCG: 50 INJECTION INTRAMUSCULAR; INTRAVENOUS at 19:22

## 2020-08-23 RX ADMIN — ASPIRIN 81 MG: 81 TABLET, COATED ORAL at 09:24

## 2020-08-23 RX ADMIN — FENTANYL CITRATE 150 MCG: 50 INJECTION, SOLUTION INTRAMUSCULAR; INTRAVENOUS at 18:22

## 2020-08-23 RX ADMIN — DOCUSATE SODIUM 50 MG AND SENNOSIDES 8.6 MG 2 TABLET: 8.6; 5 TABLET, FILM COATED ORAL at 09:24

## 2020-08-23 RX ADMIN — THERA TABS 1 TABLET: TAB at 09:24

## 2020-08-23 RX ADMIN — OMEPRAZOLE 20 MG: 20 CAPSULE, DELAYED RELEASE ORAL at 09:24

## 2020-08-23 RX ADMIN — FENTANYL CITRATE 100 MCG: 50 INJECTION, SOLUTION INTRAMUSCULAR; INTRAVENOUS at 17:01

## 2020-08-23 RX ADMIN — MORPHINE SULFATE 15 MG: 15 TABLET, FILM COATED, EXTENDED RELEASE ORAL at 21:52

## 2020-08-23 RX ADMIN — FENTANYL CITRATE 50 MCG: 50 INJECTION INTRAMUSCULAR; INTRAVENOUS at 19:29

## 2020-08-23 RX ADMIN — GABAPENTIN 500 MG: 400 CAPSULE ORAL at 09:23

## 2020-08-23 RX ADMIN — MORPHINE SULFATE 4 MG: 4 INJECTION INTRAVENOUS at 20:49

## 2020-08-23 RX ADMIN — OXYCODONE HYDROCHLORIDE 15 MG: 10 TABLET ORAL at 11:24

## 2020-08-23 RX ADMIN — DOCUSATE SODIUM 50 MG AND SENNOSIDES 8.6 MG 2 TABLET: 8.6; 5 TABLET, FILM COATED ORAL at 21:53

## 2020-08-23 RX ADMIN — AZATHIOPRINE 50 MG: 50 TABLET ORAL at 21:54

## 2020-08-23 RX ADMIN — HYDROXYUREA 1500 MG: 500 CAPSULE ORAL at 21:54

## 2020-08-23 RX ADMIN — AZATHIOPRINE 50 MG: 50 TABLET ORAL at 09:27

## 2020-08-23 RX ADMIN — ONDANSETRON 4 MG: 2 INJECTION INTRAMUSCULAR; INTRAVENOUS at 17:02

## 2020-08-23 RX ADMIN — MORPHINE SULFATE 15 MG: 15 TABLET, FILM COATED, EXTENDED RELEASE ORAL at 09:24

## 2020-08-23 RX ADMIN — OXYCODONE HYDROCHLORIDE 15 MG: 10 TABLET ORAL at 04:57

## 2020-08-23 RX ADMIN — OXYCODONE HYDROCHLORIDE 15 MG: 10 TABLET ORAL at 22:25

## 2020-08-23 RX ADMIN — ENOXAPARIN SODIUM 40 MG: 40 INJECTION SUBCUTANEOUS at 04:59

## 2020-08-23 ASSESSMENT — ENCOUNTER SYMPTOMS
BACK PAIN: 0
NECK PAIN: 0
HALLUCINATIONS: 0
PHOTOPHOBIA: 0
TINGLING: 0
HEADACHES: 0
TREMORS: 0
MYALGIAS: 0
FOCAL WEAKNESS: 0
SPUTUM PRODUCTION: 0
DIZZINESS: 0
SENSORY CHANGE: 0
DOUBLE VISION: 0
NAUSEA: 0
SPEECH CHANGE: 0
PALPITATIONS: 0
EYE PAIN: 0
COUGH: 0
ABDOMINAL PAIN: 0
VOMITING: 0
DIARRHEA: 0
SHORTNESS OF BREATH: 0
CONSTIPATION: 0
BLURRED VISION: 0
FEVER: 0
CHILLS: 0
ORTHOPNEA: 0
WEIGHT LOSS: 0

## 2020-08-23 ASSESSMENT — LIFESTYLE VARIABLES: SUBSTANCE_ABUSE: 0

## 2020-08-23 ASSESSMENT — PAIN SCALES - GENERAL: PAIN_LEVEL: 0

## 2020-08-23 NOTE — OR SURGEON
Immediate Post OP Note    PreOp Diagnosis:   1.  Soft tissue defect right scrotum  2.  Landon's Gangrene    PostOp Diagnosis: same    Procedure(s):  APPLICATION, GRAFT, SKIN, SPLIT-THICKNESS    Surgeon(s):  Elisa Craig M.D.    Anesthesiologist/Type of Anesthesia:  No anesthesia staff entered./* No anesthesia type entered *    Surgical Staff:  * No surgical staff found *    Specimens removed if any:  * No specimens in log *    Estimated Blood Loss: min    Findings:      Complications: none      [id number]  8/23/2020 4:50 PM Elisa Craig M.D.

## 2020-08-23 NOTE — ANESTHESIA PREPROCEDURE EVALUATION
Relevant Problems   NEURO   (+) History of ST elevation myocardial infarction (STEMI)   (+) History of stroke- old right parietal/occipital   (+) TIA (transient ischemic attack)      CARDIAC   (+) CAD (coronary artery disease)   (+) Carotid stenosis, 90% right carotid   (+) Paroxysmal A-fib (HCC)   (+) STEMI (ST elevation myocardial infarction) (HCC)   (+) Stenosis of right carotid artery-Right CEA 3/21/18      ENDO   (+) Type 2 diabetes mellitus, with long-term current use of insulin (HCC)      Other   (+) Polycythemia vera (HCC)       Physical Exam    Airway   Mallampati: II  TM distance: >3 FB  Neck ROM: full       Cardiovascular   Rhythm: regular  Rate: normal     Dental - normal exam           Pulmonary    Abdominal - normal exam     Neurological - normal exam                 Anesthesia Plan    ASA 3   ASA physical status 3 criteria: MI or angina - history (> 3 months)    Plan - general       Airway plan will be LMA      Plan Factors:   Patient was not previously instructed to abstain from smoking on day of procedure.  Patient did not smoke on day of procedure.      Induction: intravenous          Informed Consent:    Anesthetic plan and risks discussed with patient.    Use of blood products discussed with: patient whom.

## 2020-08-23 NOTE — CARE PLAN
Problem: Safety  Goal: Will remain free from falls  Outcome: PROGRESSING AS EXPECTED  Note: Patient up with SBA and FWW; bed locked and in lowest position, side rails up x2, call light within reach, patient calls appropriately      Problem: Pain Management  Goal: Pain level will decrease to patient's comfort goal  Outcome: PROGRESSING AS EXPECTED  Note: Assess pain q2-4h, administer pain medication as indicated, encourage patient to voice pain to staff

## 2020-08-23 NOTE — CONSULTS
DATE OF SERVICE:  08/23/2020    REASON FOR CONSULTATION:  Skin defect in right scrotum after Landon's   gangrene.    CONSULTANT:  Elisa Craig MD    BRIEF HISTORY OF PRESENTING ILLNESS:  The patient is a 70-year-old gentleman   with a past medical history significant for polycythemia; lupus; coronary   artery disease, MI, status post stent; TIA; chronic CHF; PAF, on rate-control   medication; who also has diabetes type 2.  He presented 7/25/2020 with scrotal   pain and swelling due to a right scrotal abscess with Landon's gangrene.    Urology has performed multiple washouts with debridements and he has had a   wound VAC placed.  Culture grew back Candida albicans, and ID has been   consulted who is currently following him as he is currently being treated with   antibiotics.    I have been consulted for closure of his wound as the skin edges around the   right scrotal area has inverted after sufficient granulation.    PAST MEDICAL HISTORY:  1.  Type 2 diabetes.  2.  History of MI.  3.  Lupus.  4.  CHF.  5.  PAF, on rate-control medication.  6.  Polycythemia.  7.  Right carotid stenosis.  8.  Polycythemia vera.  9.  Stroke 2016.    10.  Vertigo.    PAST SURGICAL HISTORY:  1.  Cardiac stent placement.  2.  Laminectomy.  3.  Appendectomy.  4.  Right carotid endarterectomy.  5.  Temporal artery biopsy.  6.  Multiple I and D's right scrotal abscess with I and D and wound VAC   placement.    ALLERGIES:  DOXYCYCLINE, METFORMIN, SIMVASTATIN.    MEDICATIONS:  (Please see med list), aspirin and Eliquis are on hold.  The   patient is also on prednisone.    PHYSICAL EXAMINATION:  There is a skin defect involving the right scrotum,   which is approximately 5 cm in diameter.  Both scrotums were intact.  He has a   soft tissue inversion around the wound, which is granulated.    LABS:  WBC 6.0 on 8/22/2020.    ASSESSMENT:    1.  Landon's gangrene with soft tissue defect, right scrotum.   2.  Multiple medical  comorbidities.    PLAN:  Risks and benefits of performing a skin graft of the right scrotal area   versus delayed primary closure have been discussed with the patient.  The   skin graft will yield the best result given his propensity for healing   complications and infection.  We discussed placing a wound VAC over the skin   graft temporarily for the next 4 days.  After that, wound VAC can be removed,   and patient can be discharged with outpatient wound care therapy versus home   health.    Thank you for involving me in the care of this patient.  Further recs to   follow.       ____________________________________     DRAKE SAUL MD LMT / DEANNA    DD:  08/23/2020 15:50:31  DT:  08/23/2020 16:38:15    D#:  [id number]  Job#:  [id number]

## 2020-08-23 NOTE — CARE PLAN
Problem: Communication  Goal: The ability to communicate needs accurately and effectively will improve  Outcome: PROGRESSING AS EXPECTED  Note: Patient educated to call when in need of assistance. Call light within reach. All needs met at this time.      Problem: Safety  Goal: Will remain free from injury  Outcome: PROGRESSING AS EXPECTED  Note: Patient educated on safety and fall risk. Bed is locked and in lowest position. Non-slip socks on patient.   Goal: Will remain free from falls  Outcome: PROGRESSING AS EXPECTED      Patient left voice message that he would like to talk to someone about referral to ophthalmologist Dr. Bailey.  His secondary insurance does require referrals.

## 2020-08-23 NOTE — ASSESSMENT & PLAN NOTE
With hyperglycemia. A1C uncontrolled at 8.6%. Takes insulin at home.  - reduce to moderate sliding scale  - DM diet and hypoglycemia protocol

## 2020-08-23 NOTE — ASSESSMENT & PLAN NOTE
- ID and Urology evaluated. ID evaluated on last hospitalization  - s/p 2 week course of abx s/p multiple washouts  - s/p graft placement on 8/23  - wound vac removed 8/27  - pain control  - continue gabapentin  - resume Augmentin and fluconazole based on cultures from last admission    CT abd/pelvis:  Small gas identified in the right hemiscrotum posteriorly. This is concerning for Landon's gangrene. The appearance however is markedly improved from just over one month ago

## 2020-08-23 NOTE — PROGRESS NOTES
2 RN skin check complete.   Devices in place: NC.   Skin assessed under devices: NC.     Fragile, dry skin noted BUE/BLE. Mild central abdominal/pelvic/scrotal edema noted. Right scrotal wound; wound vac bedside. Wet to dry dressing changed. Scant serosang drainage noted. NS soaked Kerlix, dry gauze, abd pad, mesh underwear replaced. LUE Midline PIV, dressing CDI. Go catheter in place with stat lock to left upper thigh; mediplex lites x 2 placed to left hip d/t rubbing. Mild redness to sacrum, mediplex in place. Dry, cracked heels. Hyperkeratinized toes.      Confirmed pressure ulcers found on: NA.   New potential pressure ulcers noted on: NA.      Wound consult already placed.   The following interventions in place: Waffle overlay. Pillows for limb positioning. Mediplex lites for go catheter rubbing prevention. Trapeze bar to assist with bed mobility. Antifungal paste to perineum. Lotion applied to extremities.

## 2020-08-23 NOTE — PROGRESS NOTES
Patient informed this RN that at the previous facility he was receiving Oxy 15 for his constant pain. Hospitalist CHANTEL notified. Received orders for Oxy 10 - 15. Administered Oxy 15 @ 2214.

## 2020-08-23 NOTE — PROGRESS NOTES
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        Overnight-Interval events:   - none Subjective/ROS:   Patient seen and examined. No new complaints overnight. Pain improved although right side remains tender. Andres in place and draining  No CP/SOB/F/C     Physical Exam:  Current Vitals:   /67   Pulse 67   Temp 36.2 °C (97.2 °F) (Temporal)   Resp 18   Ht 1.892 m (6' 2.5\")   Wt 82 kg (180 lb 12.4 oz)   SpO2 98%   BMI 22.90 kg/m²     08/21 1900 - 08/23 0659  In: 200 [P.O.:200]  Out: 3550 [Urine:3550]   GEN : NAD, A&O X4   RES:  no acute respiratory distress  ABD: Soft ND,   :   Andres in place, light yellow output, right hemiscrotum with clean wound edges, no signs of necrosis or skin breakdown. Mild discharge superior lateral right edge. Mild tenderness during exam  EXT:  No edema, SCD's in place     Labs:   Lab Results   Component Value Date/Time    WBC 4.8 08/23/2020 05:30 AM    HEMATOCRIT 42.2 08/23/2020 05:30 AM    SODIUM 140 08/18/2020 04:30 AM    POTASSIUM 3.8 08/18/2020 04:30 AM    CHLORIDE 105 08/18/2020 04:30 AM    CO2 25 08/18/2020 04:30 AM    BUN 10 08/18/2020 04:30 AM    CREATININE 0.82 08/18/2020 04:30 AM    CALCIUM 8.7 08/18/2020 04:30 AM    MAGNESIUM 2.0 08/18/2020 04:30 AM        Lab Results   Component Value Date/Time    GLUCOSE 157 (H) 08/18/2020 04:30 AM    GLUCOSE 167 (H) 08/17/2020 04:02 AM    GLUCOSE 168 (H) 08/16/2020 04:17 AM    GLUCOSE 145 (H) 08/15/2020 04:08 AM          Assessment/Plan  Francesco Schulte is a 70 y.o. 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 .    - continue wet to dry dressing changes  - anticipate plastic reconstructive consult later today with possible OR for graft  - urology following      Joe Graff, " JAN  [facility] [place]

## 2020-08-23 NOTE — PROGRESS NOTES
Patient down to pre-op with patient transport, CHG bath and pre-op checklist complete. 1500 FSBS 113

## 2020-08-23 NOTE — PROGRESS NOTES
[facility] Medicine Daily Progress Note    Date of Service  8/23/2020    Chief Complaint  70 y.o. male admitted 8/22/2020 with scrotal pain    Hospital Course    *70-year-old male with past medical history of polycythemia, lupus, CAD, TIA, chronic systolic heart failure, PAF presented to the hospital on July 25, 2020 with a scrotal pain and swelling due to right scrotal abscess and Landon's gangrene.  Urology was emergently consulted and he underwent washout on July 25 with 3 more subsequent washout on July 29, July 31 and August 3 and wound VAC was placed.  Culture showed Abigail albicans and infectious disease consulted and he completed the course of Unasyn and fluconazole on August 17, 2020.  Patient transferred to [facility] from [facility] for plastic surgery consult.*      Interval Problem Update  I evaluated and examined him at the bedside.  He reported that he is feeling better and his scrotal pain has improved.  I discussed case with urology.  Plastic surgery consulted awaiting recommendation    Consultants/Specialty  Urology  Infectious disease  Plastic surgery    Code Status  Full Code    Disposition  To be decided    Review of Systems  Review of Systems   Constitutional: Negative for chills, fever and weight loss.   HENT: Negative for hearing loss and tinnitus.    Eyes: Negative for blurred vision, double vision, photophobia and pain.   Respiratory: Negative for cough, sputum production and shortness of breath.    Cardiovascular: Negative for chest pain, palpitations, orthopnea and leg swelling.   Gastrointestinal: Negative for abdominal pain, constipation, diarrhea, nausea and vomiting.   Genitourinary: Negative for dysuria, frequency and urgency.        Scrotal pain   Musculoskeletal: Negative for back pain, joint pain, myalgias and neck pain.   Skin: Negative for rash.   Neurological: Negative for dizziness, tingling, tremors, sensory change, speech change, focal weakness and headaches.    Psychiatric/Behavioral: Negative for hallucinations and substance abuse.   All other systems reviewed and are negative.       Physical Exam  Temp:  [36 °C (96.8 °F)-36.5 °C (97.7 °F)] 36.1 °C (97 °F)  Pulse:  [60-75] 75  Resp:  [16-18] 18  BP: (109-130)/(67-80) 130/78  SpO2:  [96 %-99 %] 96 %    Physical Exam  Vitals signs reviewed.   Constitutional:       General: He is not in acute distress.  HENT:      Head: Normocephalic and atraumatic. No contusion.      Right Ear: External ear normal.      Left Ear: External ear normal.      Nose: Nose normal.      Mouth/Throat:      Mouth: Mucous membranes are moist.      Pharynx: No oropharyngeal exudate.   Eyes:      General:         Right eye: No discharge.         Left eye: No discharge.      Pupils: Pupils are equal, round, and reactive to light.   Neck:      Musculoskeletal: No neck rigidity or muscular tenderness.   Cardiovascular:      Rate and Rhythm: Normal rate and regular rhythm.      Heart sounds: No murmur. No friction rub. No gallop.    Pulmonary:      Effort: Pulmonary effort is normal.      Breath sounds: No wheezing or rhonchi.   Abdominal:      General: Bowel sounds are normal. There is no distension.      Palpations: Abdomen is soft.      Tenderness: There is no abdominal tenderness. There is no rebound.   Genitourinary:     Comments: Tenderness on scrotal area.  Musculoskeletal: Normal range of motion.         General: No swelling or tenderness.   Skin:     General: Skin is warm and dry.      Coloration: Skin is not jaundiced.   Neurological:      General: No focal deficit present.      Mental Status: He is alert.      Cranial Nerves: No cranial nerve deficit.      Sensory: No sensory deficit.   Psychiatric:         Mood and Affect: Mood normal.         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  Multiple washout.  Completed course of antibiotic  Plastic surgery consulted  Urology is following him.  Continue to provide him pain control.    Stenosis of right carotid artery-Right CEA 3/21/18- (present on admission)  Assessment & Plan  Continue aspirin.      Thrombocytosis ([facility])  Assessment & Plan  Platelet count is trending down  Continue hydroxyurea.    Type 2 diabetes mellitus, with long-term current use of insulin ([facility])- (present on admission)  Assessment & Plan  Continues insulin.  Continue hypoglycemia protocol.    Paroxysmal A-fib ([facility])- (present on admission)  Assessment & Plan  Holding apixaban due to possible upcoming procedure.  Continue Lovenox for DVT prophylaxis  Resume anticoagulation once okay from surgery.    TIA (transient ischemic attack)- (present on admission)  Assessment & Plan  Continue aspirin.      SLE (systemic lupus erythematosus related syndrome) ([facility])- (present on admission)  Assessment & Plan  Holding steroid in setting of infection.  Last dose of his steroid was July 31, 2020.  No signs of adrenal insufficiency.    Polycythemia vera ([facility])- (present on admission)  Assessment & Plan  Platelet count has been trending down.  Hematology evaluated him.  Continue hydroxyurea.     I discussed plan of care with urology and bedside RN.    VTE prophylaxis: Lovenox

## 2020-08-23 NOTE — ASSESSMENT & PLAN NOTE
- Holding steroid in setting of acute infection; last dose was July 31.  - Continue azathioprine  - restarted naproxen BID PRN flare

## 2020-08-23 NOTE — ASSESSMENT & PLAN NOTE
Improving. Plt improving from 786 --> 434 --> 397--> 309--> 225 today.  - Continue hydroxyurea, dose lowered   - will resume home regimen on discharge  - outpatient follow up with heme/onc

## 2020-08-23 NOTE — PROGRESS NOTES
Bedside report received.  Assessment complete.  A&O x 4. Patient calls appropriately.  Patient ambulates with x1 assist.    Patient has 4/10 pain. Pain managed with prescribed medications.  Denies N&V. Tolerating diabetic diet.  Dressing to right scrotum, CDI.   + void via go, + flatus, - BM  Patient denies SOB.  SCD's off.    Review plan with of care with patient. Call light and personal belongings with in reach. Hourly rounding in place. All needs met at this time.

## 2020-08-23 NOTE — PROGRESS NOTES
OHF/MB set up for pt. All bolts ensured for tightness. Frame in steady, working condition and MB set to pt's liking. Please call traction for further assistance.

## 2020-08-23 NOTE — PROGRESS NOTES
Received report from JOSLYN Moss. Assumed care at 1915. This pt is AOx4, ambulatory with standby assist, denies N/V this evening, (-) flatus, voiding with go, active orders, last BM: 08/22/20 , reporting scrotal pain that is 8/10, medicated per MAR. Patient and RN discussed plan of care, all questions answered. Labs noted, assessment complete, patient tolerating diabetic diet. Call light in place, personal belongings available, bed in lowest position, upper bedrails up, patient educated on importance of calling for assistance, hourly rounding in place. No additional needs at this time. VSS. Will continue to monitor. Dressing change completed.

## 2020-08-24 PROBLEM — I50.20 SYSTOLIC HEART FAILURE (HCC): Status: ACTIVE | Noted: 2017-03-13

## 2020-08-24 LAB
ALBUMIN SERPL BCP-MCNC: 2.8 G/DL (ref 3.2–4.9)
ALBUMIN/GLOB SERPL: 0.7 G/DL
ALP SERPL-CCNC: 47 U/L (ref 30–99)
ALT SERPL-CCNC: 8 U/L (ref 2–50)
ANION GAP SERPL CALC-SCNC: 12 MMOL/L (ref 7–16)
AST SERPL-CCNC: 11 U/L (ref 12–45)
BASOPHILS # BLD AUTO: 1.4 % (ref 0–1.8)
BASOPHILS # BLD: 0.07 K/UL (ref 0–0.12)
BILIRUB SERPL-MCNC: 0.4 MG/DL (ref 0.1–1.5)
BUN SERPL-MCNC: 11 MG/DL (ref 8–22)
CALCIUM SERPL-MCNC: 9 MG/DL (ref 8.5–10.5)
CHLORIDE SERPL-SCNC: 100 MMOL/L (ref 96–112)
CO2 SERPL-SCNC: 25 MMOL/L (ref 20–33)
CREAT SERPL-MCNC: 0.65 MG/DL (ref 0.5–1.4)
EOSINOPHIL # BLD AUTO: 0.08 K/UL (ref 0–0.51)
EOSINOPHIL NFR BLD: 1.6 % (ref 0–6.9)
ERYTHROCYTE [DISTWIDTH] IN BLOOD BY AUTOMATED COUNT: 55.2 FL (ref 35.9–50)
GLOBULIN SER CALC-MCNC: 3.8 G/DL (ref 1.9–3.5)
GLUCOSE BLD-MCNC: 132 MG/DL (ref 65–99)
GLUCOSE BLD-MCNC: 143 MG/DL (ref 65–99)
GLUCOSE BLD-MCNC: 166 MG/DL (ref 65–99)
GLUCOSE BLD-MCNC: 167 MG/DL (ref 65–99)
GLUCOSE SERPL-MCNC: 152 MG/DL (ref 65–99)
HCT VFR BLD AUTO: 39.9 % (ref 42–52)
HGB BLD-MCNC: 12.1 G/DL (ref 14–18)
IMM GRANULOCYTES # BLD AUTO: 0.06 K/UL (ref 0–0.11)
IMM GRANULOCYTES NFR BLD AUTO: 1.2 % (ref 0–0.9)
LYMPHOCYTES # BLD AUTO: 0.34 K/UL (ref 1–4.8)
LYMPHOCYTES NFR BLD: 6.7 % (ref 22–41)
MAGNESIUM SERPL-MCNC: 1.8 MG/DL (ref 1.5–2.5)
MCH RBC QN AUTO: 25.3 PG (ref 27–33)
MCHC RBC AUTO-ENTMCNC: 30.3 G/DL (ref 33.7–35.3)
MCV RBC AUTO: 83.5 FL (ref 81.4–97.8)
MONOCYTES # BLD AUTO: 0.19 K/UL (ref 0–0.85)
MONOCYTES NFR BLD AUTO: 3.7 % (ref 0–13.4)
NEUTROPHILS # BLD AUTO: 4.34 K/UL (ref 1.82–7.42)
NEUTROPHILS NFR BLD: 85.4 % (ref 44–72)
NRBC # BLD AUTO: 0 K/UL
NRBC BLD-RTO: 0 /100 WBC
PLATELET # BLD AUTO: 619 K/UL (ref 164–446)
PMV BLD AUTO: 10.6 FL (ref 9–12.9)
POTASSIUM SERPL-SCNC: 3.7 MMOL/L (ref 3.6–5.5)
PROT SERPL-MCNC: 6.6 G/DL (ref 6–8.2)
RBC # BLD AUTO: 4.78 M/UL (ref 4.7–6.1)
SODIUM SERPL-SCNC: 137 MMOL/L (ref 135–145)
WBC # BLD AUTO: 5.1 K/UL (ref 4.8–10.8)

## 2020-08-24 PROCEDURE — A9270 NON-COVERED ITEM OR SERVICE: HCPCS | Performed by: INTERNAL MEDICINE

## 2020-08-24 PROCEDURE — A9270 NON-COVERED ITEM OR SERVICE: HCPCS | Performed by: NURSE PRACTITIONER

## 2020-08-24 PROCEDURE — 770001 HCHG ROOM/CARE - MED/SURG/GYN PRIV*

## 2020-08-24 PROCEDURE — 99232 SBSQ HOSP IP/OBS MODERATE 35: CPT | Performed by: INTERNAL MEDICINE

## 2020-08-24 PROCEDURE — 85025 COMPLETE CBC W/AUTO DIFF WBC: CPT

## 2020-08-24 PROCEDURE — 80053 COMPREHEN METABOLIC PANEL: CPT

## 2020-08-24 PROCEDURE — 700102 HCHG RX REV CODE 250 W/ 637 OVERRIDE(OP): Performed by: INTERNAL MEDICINE

## 2020-08-24 PROCEDURE — 36415 COLL VENOUS BLD VENIPUNCTURE: CPT

## 2020-08-24 PROCEDURE — 700102 HCHG RX REV CODE 250 W/ 637 OVERRIDE(OP): Performed by: NURSE PRACTITIONER

## 2020-08-24 PROCEDURE — 83735 ASSAY OF MAGNESIUM: CPT

## 2020-08-24 PROCEDURE — 82962 GLUCOSE BLOOD TEST: CPT | Mod: 91

## 2020-08-24 PROCEDURE — 700111 HCHG RX REV CODE 636 W/ 250 OVERRIDE (IP): Performed by: NURSE PRACTITIONER

## 2020-08-24 PROCEDURE — 99222 1ST HOSP IP/OBS MODERATE 55: CPT | Performed by: INTERNAL MEDICINE

## 2020-08-24 RX ORDER — ACETAMINOPHEN 500 MG
1000 TABLET ORAL 3 TIMES DAILY PRN
Status: DISCONTINUED | OUTPATIENT
Start: 2020-08-24 | End: 2020-08-31 | Stop reason: HOSPADM

## 2020-08-24 RX ADMIN — BENZOCAINE AND MENTHOL, UNSPECIFIED FORM 1 LOZENGE: 15; 3.6 LOZENGE ORAL at 12:46

## 2020-08-24 RX ADMIN — MORPHINE SULFATE 15 MG: 15 TABLET, FILM COATED, EXTENDED RELEASE ORAL at 22:14

## 2020-08-24 RX ADMIN — MORPHINE SULFATE 4 MG: 4 INJECTION INTRAVENOUS at 22:26

## 2020-08-24 RX ADMIN — ASPIRIN 81 MG: 81 TABLET, COATED ORAL at 07:24

## 2020-08-24 RX ADMIN — GABAPENTIN 500 MG: 400 CAPSULE ORAL at 13:16

## 2020-08-24 RX ADMIN — THERA TABS 1 TABLET: TAB at 07:24

## 2020-08-24 RX ADMIN — AZATHIOPRINE 50 MG: 50 TABLET ORAL at 07:25

## 2020-08-24 RX ADMIN — BENZOCAINE AND MENTHOL, UNSPECIFIED FORM 1 LOZENGE: 15; 3.6 LOZENGE ORAL at 16:25

## 2020-08-24 RX ADMIN — OXYCODONE HYDROCHLORIDE 15 MG: 10 TABLET ORAL at 16:21

## 2020-08-24 RX ADMIN — GABAPENTIN 500 MG: 400 CAPSULE ORAL at 19:58

## 2020-08-24 RX ADMIN — HYDROXYUREA 1500 MG: 500 CAPSULE ORAL at 22:14

## 2020-08-24 RX ADMIN — OXYCODONE HYDROCHLORIDE 15 MG: 10 TABLET ORAL at 19:57

## 2020-08-24 RX ADMIN — INSULIN LISPRO 4 UNITS: 100 INJECTION, SOLUTION INTRAVENOUS; SUBCUTANEOUS at 12:47

## 2020-08-24 RX ADMIN — AZATHIOPRINE 50 MG: 50 TABLET ORAL at 22:14

## 2020-08-24 RX ADMIN — OMEPRAZOLE 20 MG: 20 CAPSULE, DELAYED RELEASE ORAL at 07:24

## 2020-08-24 RX ADMIN — MORPHINE SULFATE 15 MG: 15 TABLET, FILM COATED, EXTENDED RELEASE ORAL at 07:24

## 2020-08-24 RX ADMIN — GABAPENTIN 500 MG: 400 CAPSULE ORAL at 07:25

## 2020-08-24 RX ADMIN — DOCUSATE SODIUM 50 MG AND SENNOSIDES 8.6 MG 2 TABLET: 8.6; 5 TABLET, FILM COATED ORAL at 07:24

## 2020-08-24 RX ADMIN — OXYCODONE HYDROCHLORIDE 10 MG: 10 TABLET ORAL at 07:22

## 2020-08-24 RX ADMIN — OXYCODONE HYDROCHLORIDE 15 MG: 10 TABLET ORAL at 11:55

## 2020-08-24 RX ADMIN — APIXABAN 5 MG: 5 TABLET, FILM COATED ORAL at 17:00

## 2020-08-24 RX ADMIN — ACETAMINOPHEN 1000 MG: 500 TABLET ORAL at 16:22

## 2020-08-24 ASSESSMENT — ENCOUNTER SYMPTOMS
MYALGIAS: 0
NECK PAIN: 0
SPUTUM PRODUCTION: 0
BACK PAIN: 0
DOUBLE VISION: 0
BLURRED VISION: 0
DIZZINESS: 0
WEIGHT LOSS: 0
HALLUCINATIONS: 0
ABDOMINAL PAIN: 0
SHORTNESS OF BREATH: 0
COUGH: 0
PHOTOPHOBIA: 0
TINGLING: 0
FOCAL WEAKNESS: 0
SENSORY CHANGE: 0
SPEECH CHANGE: 0
EYE PAIN: 0
TREMORS: 0
PALPITATIONS: 0
ORTHOPNEA: 0
CONSTIPATION: 0
CHILLS: 0
NAUSEA: 0
FEVER: 0
HEADACHES: 0
DIARRHEA: 0
VOMITING: 0

## 2020-08-24 ASSESSMENT — CHA2DS2 SCORE
CHF OR LEFT VENTRICULAR DYSFUNCTION: YES
DIABETES: YES
CHA2DS2 VASC SCORE: 6
PRIOR STROKE OR TIA OR THROMBOEMBOLISM: YES
AGE 75 OR GREATER: NO
SEX: MALE
AGE 65 TO 74: YES
VASCULAR DISEASE: YES
HYPERTENSION: NO

## 2020-08-24 ASSESSMENT — LIFESTYLE VARIABLES: SUBSTANCE_ABUSE: 0

## 2020-08-24 NOTE — PROGRESS NOTES
[facility] Medicine Daily Progress Note    Date of Service  8/24/2020    Chief Complaint  70 y.o. male admitted 8/22/2020 with scrotal pain    Hospital Course    *70-year-old male with past medical history of polycythemia, lupus, CAD, TIA, chronic systolic heart failure, PAF presented to the hospital on July 25, 2020 with a scrotal pain and swelling due to right scrotal abscess and Landon's gangrene.  Urology was emergently consulted and he underwent washout on July 25 with 3 more subsequent washout on July 29, July 31 and August 3 and wound VAC was placed.  Culture showed Abigail albicans and infectious disease consulted and he completed the course of Unasyn and fluconazole on August 17, 2020.  Patient transferred to [facility] from [facility] for plastic surgery consult.*      Interval Problem Update  I evaluated and examined him at the bedside.  He reported that he is happy that he received graft by plastic surgery.  I discussed plan of care with him.    I discussed with plastic surgery Dr. Craig and regarding anticoagulation and she recommended that patient can have anticoagulation.   I also discussed with urology and recommended to resume anticoagulation if it is okay from plastic surgery.    I reevaluate him again at the bedside to update him regarding my discussion with plastic surgery to resume his anticoagulation.  Patient's daughter at the bedside and she requested for cardiology consult due to low ejection fraction.  Patient and her daughter reported that she had appointment with cardiology but due to inpatient his appointment is canceled.    As per patient request I requested consult with cardiology due to his low ejection fraction.          Consultants/Specialty  Urology  Infectious disease  Plastic surgery    Code Status  Full Code    Disposition  To be decided    Review of Systems  Review of Systems   Constitutional: Negative for chills, fever and weight loss.   HENT: Negative for hearing  loss and tinnitus.    Eyes: Negative for blurred vision, double vision, photophobia and pain.   Respiratory: Negative for cough, sputum production and shortness of breath.    Cardiovascular: Negative for chest pain, palpitations, orthopnea and leg swelling.   Gastrointestinal: Negative for abdominal pain, constipation, diarrhea, nausea and vomiting.   Genitourinary: Negative for dysuria, frequency and urgency.        Scrotal pain   Musculoskeletal: Negative for back pain, joint pain, myalgias and neck pain.   Skin: Negative for rash.   Neurological: Negative for dizziness, tingling, tremors, sensory change, speech change, focal weakness and headaches.   Psychiatric/Behavioral: Negative for hallucinations and substance abuse.   All other systems reviewed and are negative.       Physical Exam  Temp:  [35.8 °C (96.5 °F)-36.7 °C (98 °F)] 36.4 °C (97.6 °F)  Pulse:  [60-79] 79  Resp:  [11-18] 18  BP: (101-142)/(59-87) 119/75  SpO2:  [93 %-100 %] 95 %    Physical Exam  Vitals signs reviewed.   Constitutional:       General: He is not in acute distress.  HENT:      Head: Normocephalic and atraumatic. No contusion.      Right Ear: External ear normal.      Left Ear: External ear normal.      Nose: Nose normal.      Mouth/Throat:      Mouth: Mucous membranes are moist.      Pharynx: No oropharyngeal exudate.   Eyes:      General:         Right eye: No discharge.         Left eye: No discharge.      Pupils: Pupils are equal, round, and reactive to light.   Neck:      Musculoskeletal: No neck rigidity or muscular tenderness.   Cardiovascular:      Rate and Rhythm: Normal rate and regular rhythm.      Heart sounds: No murmur. No friction rub. No gallop.    Pulmonary:      Effort: Pulmonary effort is normal.      Breath sounds: No wheezing or rhonchi.   Abdominal:      General: Bowel sounds are normal. There is no distension.      Palpations: Abdomen is soft.      Tenderness: There is no abdominal tenderness. There is no rebound.    Genitourinary:     Comments: Tenderness on scrotal area.  Wound VAC is present on his scrotal area  Musculoskeletal: Normal range of motion.         General: No swelling or tenderness.   Skin:     General: Skin is warm and dry.      Coloration: Skin is not jaundiced.   Neurological:      General: No focal deficit present.      Mental Status: He is alert.      Cranial Nerves: No cranial nerve deficit.      Sensory: No sensory deficit.   Psychiatric:         Mood and Affect: Mood normal.         Fluids    Intake/Output Summary (Last 24 hours) at 8/24/2020 1655  Last data filed at 8/24/2020 1158  Gross per 24 hour   Intake 150 ml   Output 575 ml   Net -425 ml       Laboratory  Recent Labs     08/22/20  0436 08/23/20  0530 08/24/20  0732   WBC 6.0 4.8 5.1   RBC 4.78 5.00 4.78   HEMOGLOBIN 11.7* 12.4* 12.1*   HEMATOCRIT 39.1* 42.2 39.9*   MCV 81.8 84.4 83.5   MCH 24.5* 24.8* 25.3*   MCHC 29.9* 29.4* 30.3*   RDW 54.9* 55.7* 55.2*   PLATELETCT 809* 786* 619*   MPV 10.6 10.6 10.6     Recent Labs     08/24/20  0732   SODIUM 137   POTASSIUM 3.7   CHLORIDE 100   CO2 25   GLUCOSE 152*   BUN 11   CREATININE 0.65   CALCIUM 9.0                   Imaging  No orders to display        Assessment/Plan  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  Multiple washout.  Completed course of antibiotic  Plastic surgery consulted and he underwent graft placement on August 23, 2020.  Urology is following him.  Continue to provide him pain control.    Stenosis of right carotid artery-Right CEA 3/21/18- (present on admission)  Assessment & Plan  Continue aspirin.      Thrombocytosis ([facility])  Assessment & Plan  Platelet count is trending down  Continue hydroxyurea.    Type 2 diabetes mellitus, with long-term current use of insulin ([facility])- (present on admission)  Assessment & Plan  Continues insulin.  Continue hypoglycemia protocol.    Systolic heart failure ([facility])  Assessment & Plan  He  found to have low ejection fraction and does not show signs of acute heart failure exacerbation.  Patient and her daughter is very concerned regarding low ejection fraction.  I requested consult with cardiology regarding low ejection fraction.    Paroxysmal A-fib ([facility])- (present on admission)  Assessment & Plan  Today I discussed with plastic surgery as well as with urology regarding anticoagulation and after discussion with them I resume his Eliquis.  Updated him and his daughter at the bedside regarding continuation of anticoagulation.  Resume anticoagulation once okay from surgery.    TIA (transient ischemic attack)- (present on admission)  Assessment & Plan  Continue aspirin.      SLE (systemic lupus erythematosus related syndrome) ([facility])- (present on admission)  Assessment & Plan  Holding steroid in setting of infection.  Last dose of his steroid was July 31, 2020.  Continue azathioprine.  No signs of adrenal insufficiency.    Polycythemia vera ([facility])- (present on admission)  Assessment & Plan  Platelet count has been trending down.  Pathologist evaluated him during earlier hospitalization in [facility].  Hematology evaluated him.  Continue hydroxyurea.    I discussed plan of care with patient and her daughter at the bedside regarding his current medical condition.  I requested consult with cardiology due to low ejection fraction.  I discussed plan of care during multidisciplinary rounds.    VTE prophylaxis: Lovenox

## 2020-08-24 NOTE — ANESTHESIA TIME REPORT
Anesthesia Start and Stop Event Times     Date Time Event    8/23/2020 1616 Ready for Procedure     1648 Anesthesia Start     1845 Anesthesia Stop        Responsible Staff  08/23/20    Name Role Begin End    Deangelo Hancock M.D. Anesth 1648 1845        Preop Diagnosis (Free Text):  Pre-op Diagnosis     STSG to right scrotum         Preop Diagnosis (Codes):    Post op Diagnosis  Open wound of scrotum      Premium Reason  E. Weekend    Comments:

## 2020-08-24 NOTE — PROGRESS NOTES
Received report from JOSLYN Silver in PACU. Assumed care at 2000. Patient brought up to unit by transport. This pt is AOx4, ambulatory with x1-x2 assit, denies N/V this evening, (-) flatus, voiding with go, active orders present, last BM: 08/23/20 , reporting scrotal pain pain that is 8/10, medicated per MAR. Patient and RN discussed plan of care, all questions answered. Labs noted, assessment complete, patient NPO at this time. Call light in place, personal belongings available, bed in lowest position, upper bedrails up, patient not tolerating bed alarm d/t pain, patient educated on importance of calling for assistance, hourly rounding in place. No additional needs at this time. VSS. Will continue to monitor. Wound vac to scrotal area.

## 2020-08-24 NOTE — PROGRESS NOTES
"[facility]    Progress Note    Service: [facility] [place]  Patient's Name: Francesco Schulte  MRN: [id number]  Admit Date:8/22/2020  Today's Date: 8/23/2020   Room #: T424/02      Identification:  70 y.o. male with kira's gangrene s/p multiple I&D and washouts.        Overnight-Interval events:   - none Subjective/ROS:   Patient seen and examined. S/P skin graft 8/23/20. No new complaints overnight. Pain well controlled at this time. Andres in place and draining.  No CP/SOB/F/C     Physical Exam:  Current Vitals:   /71   Pulse 75   Temp 36.2 °C (97.1 °F) (Temporal)   Resp 18   Ht 1.892 m (6' 2.5\")   Wt 82 kg (180 lb 12.4 oz)   SpO2 98%   BMI 22.90 kg/m²     08/22 1900 - 08/24 0659  In: 350 [P.O.:300; I.V.:50]  Out: 4175 [Urine:4175]   GEN : NAD, A&O X4   RES:  no acute respiratory distress  ABD: Soft ND,   :   Andres in place, light yellow output, right hemiscrotum with wound vac in place.  EXT:  No edema, SCD's in place     Labs:   Lab Results   Component Value Date/Time    WBC 5.1 08/24/2020 07:32 AM    HEMATOCRIT 39.9 (L) 08/24/2020 07:32 AM    SODIUM 137 08/24/2020 07:32 AM    POTASSIUM 3.7 08/24/2020 07:32 AM    CHLORIDE 100 08/24/2020 07:32 AM    CO2 25 08/24/2020 07:32 AM    BUN 11 08/24/2020 07:32 AM    CREATININE 0.65 08/24/2020 07:32 AM    CALCIUM 9.0 08/24/2020 07:32 AM    MAGNESIUM 1.8 08/24/2020 07:32 AM        Lab Results   Component Value Date/Time    GLUCOSE 152 (H) 08/24/2020 07:32 AM    GLUCOSE 157 (H) 08/18/2020 04:30 AM    GLUCOSE 167 (H) 08/17/2020 04:02 AM    GLUCOSE 168 (H) 08/16/2020 04:17 AM          Assessment/Plan  Francesco Schulte is a 70 y.o. 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 Now s/p skin graft 8/23/20.    - continue wound vac per plastics  - manage pain  - urology signing off. We do not anticipate any further urological intervention at this time. We will arrange outpatient follow-up.      Dominga Jordan PA-C  [facility] [place] "

## 2020-08-24 NOTE — WOUND TEAM
received wound consult that patient went to surgery with Dr. Craig with STSG placed.  Per note, leave graft for 4 days and replace with moist dressing.  Wound team to follow up at that time for specific orders.

## 2020-08-24 NOTE — OP REPORT
DATE OF SERVICE:  08/23/2020    PREOPERATIVE DIAGNOSES:    1.  Soft tissue defect, right scrotum (12x10 cm).  2.  Landon gangrene.    POSTOPERATIVE DIAGNOSES:    1.  Soft tissue defect, right scrotum (12x10 cm).  2.  Landon gangrene.    PROCEDURES:  1.  Split-thickness skin graft to scrotal area and right groin (12x10 cm).  2.  Excisional opening of sinus tract, right groin.  3.  Placement of wound VAC.    SURGEON:  Elisa Craig MD    ASSISTANT:  None.    ANESTHESIA:  General.    ANESTHESIOLOGIST:  Deangelo Hancock MD    SPECIMEN:  None.    ESTIMATED BLOOD LOSS:  Minimal.    DRAINS:  None.    COMPLICATIONS:  None.    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  He has had multiple debridements by urology and is now   presenting for split-thickness skin graft of the right scrotal area in order   to aid in healing as the skin edges in the area are inverting.  Risks,   benefits and alternatives were discussed.    PROCEDURE IN DETAIL:  After a satisfactory level of general anesthesia, the   patient was placed in the lithotomy position and the groin and right thigh   were prepped and draped in a sterile manner.  The scrotal area was examined   and there was a large 10x12 cm defect involving the right scrotal and groin   area.  As the area was examined, the skin edges were elevated   circumferentially and the wound bed was prepared excisionally using a curette.    There was noted to be a sinus tract in the right groin area and in order to   prevent this area from being missed, this was opened excisionally by   performing an incision in this area in order to open the skin overlying this   tract.  This entailed the 5 cm incision through the skin of the scrotal area.    Meticulous hemostasis was obtained and the area was prepared for a graft by   irrigating the area.  An appropriate-sized  skin graft was taken from the   lateral aspect of the right thigh after injecting the area with 1% lidocaine   with epinephrine.  A 0.012-inch thickness skin graft was taken using a   dermatome.  The graft was meshed in a 1:1.5 mesher.  The graft was then placed   into the recipient site and was sutured circumferentially using a chromic   suture.  As the area was completely grafted including all skin folded areas,   the skin graft was covered with Adaptic and a wound VAC was placed to 125 mmHg   suction with no air leaks.  The donor site was dressed with Xeroform and   overlying saline and epinephrine-soaked gauze.  The patient was transferred to   recovery where he will be followed as an inpatient.  Wound VAC will be left   in place for 4 days and will be then removed with only moist dressings after   that.       ____________________________________     DRAKE SAUL MD LMT / DEANNA    DD:  08/23/2020 18:45:08  DT:  08/23/2020 23:19:58    D#:  [id number]  Job#:  [id number]

## 2020-08-24 NOTE — CARE PLAN
Problem: Communication  Goal: The ability to communicate needs accurately and effectively will improve  Outcome: PROGRESSING AS EXPECTED  Note: Patient educated to call when in need of assistance. Call light within reach. All needs met at this time.      Problem: Fluid Volume:  Goal: Will maintain balanced intake and output  Outcome: PROGRESSING AS EXPECTED  Note: Patient educated to maintain adequate oral hydration.

## 2020-08-24 NOTE — ANESTHESIA QCDR
2019 [facility] Clinical Data Registry (for Quality Improvement)     Postoperative nausea/vomiting risk protocol (Adult = 18 yrs and Pediatric 3-17 yrs)- (430 and 463)  General inhalation anesthetic (NOT TIVA) with PONV risk factors: Yes  Provision of anti-emetic therapy with at least 2 different classes of agents: Yes   Patient DID NOT receive anti-emetic therapy and reason is documented in Medical Record:  N/A    Multimodal Pain Management- (477)  Non-emergent surgery AND patient age >= 18: Yes  Use of Multimodal Pain Management, two or more drugs and/or interventions, NOT including systemic opioids: No  Exception: Documented allergy to multiple classes of analgesics: No       Smoking Abstinence (404)  Patient is current smoker (cigarette, pipe, e-cig, marijuanna): No  Elective Surgery:   Abstinence instructions provided prior to day of surgery:   Patient abstained from smoking on day of surgery:     Pre-Op Beta-Blocker in Isolated CABG (44)  Isolated CABG AND patient age >= 18: No  Beta-blocker admin within 24 hours of surgical incision:   Exception:of medical reason(s) for not administering beta blocker within 24 hours prior to surgical incision (e.g., not  indicated,other medical reason):     PACU assessment of acute postoperative pain prior to Anesthesia Care End- Applies to Patients Age = 18- (ABG7)  Initial PACU pain score is which of the following: < 7/10  Patient unable to report pain score: N/A    Post-anesthetic transfer of care checklist/protocol to PACU/ICU- (426 and 427)  Upon conclusion of case, patient transferred to which of the following locations: PACU/Non-ICU  Use of transfer checklist/protocol: Yes  Exclusion: Service Performed in Patient Hospital Room (and thus did not require transfer): N/A  Unplanned admission to ICU related to anesthesia service up through end of PACU care- (MD51)  Unplanned admission to ICU (not initially anticipated at anesthesia start time): No

## 2020-08-24 NOTE — ANESTHESIA PROCEDURE NOTES
Airway    Date/Time: 8/23/2020 5:00 PM  Performed by: Deangelo Hancock M.D.  Authorized by: Deangelo Hancock M.D.     Location:  OR  Urgency:  Elective  Difficult Airway: No    Indications for Airway Management:  Anesthesia      Spontaneous Ventilation: present    Sedation Level:  Deep  Preoxygenated: Yes    Patient Position:  Sniffing  MILS Maintained Throughout: No    Mask Difficulty Assessment:  0 - not attempted  Final Airway Type:  Supraglottic airway  Final Supraglottic Airway:  Standard LMA    SGA Size:  5  Number of Attempts at Approach:  1

## 2020-08-24 NOTE — PROGRESS NOTES
Contacted hospitalist APRN regarding changing pt's diet order from NPO to diabetic. Orders received.

## 2020-08-24 NOTE — ANESTHESIA POSTPROCEDURE EVALUATION
Patient: Francesco Schulte    Procedure Summary     Date: 08/23/20 Room / Location: [place] [address] / SURGERY [facility]    Anesthesia Start: 1648 Anesthesia Stop: 1845    Procedure: APPLICATION, GRAFT, SKIN, SPLIT-THICKNESS (N/A Scrotum) Diagnosis: (STSG to right scrotum )    Surgeon: Elisa Craig M.D. Responsible Provider: Deangelo Hancokc M.D.    Anesthesia Type: general ASA Status: 3          Final Anesthesia Type: general  Last vitals  BP   Blood Pressure : 135/78    Temp   35.8 °C (96.5 °F)    Pulse   Pulse: 73   Resp   (!) 11    SpO2   98 %      Anesthesia Post Evaluation    Patient location during evaluation: PACU  Patient participation: complete - patient participated  Level of consciousness: awake  Pain score: 0    Airway patency: patent  Anesthetic complications: no  Cardiovascular status: adequate  Respiratory status: acceptable  Hydration status: acceptable    PONV: none           Nurse Pain Score: 6 (NPRS)

## 2020-08-25 PROBLEM — Z86.73 HISTORY OF TIA (TRANSIENT ISCHEMIC ATTACK): Status: ACTIVE | Noted: 2020-08-20

## 2020-08-25 LAB
ALBUMIN SERPL BCP-MCNC: 2.9 G/DL (ref 3.2–4.9)
ALBUMIN/GLOB SERPL: 0.8 G/DL
ALP SERPL-CCNC: 49 U/L (ref 30–99)
ALT SERPL-CCNC: 7 U/L (ref 2–50)
ANION GAP SERPL CALC-SCNC: 12 MMOL/L (ref 7–16)
AST SERPL-CCNC: 8 U/L (ref 12–45)
BASOPHILS # BLD AUTO: 0.8 % (ref 0–1.8)
BASOPHILS # BLD: 0.05 K/UL (ref 0–0.12)
BILIRUB SERPL-MCNC: 0.5 MG/DL (ref 0.1–1.5)
BUN SERPL-MCNC: 11 MG/DL (ref 8–22)
CALCIUM SERPL-MCNC: 8.9 MG/DL (ref 8.5–10.5)
CHLORIDE SERPL-SCNC: 101 MMOL/L (ref 96–112)
CO2 SERPL-SCNC: 23 MMOL/L (ref 20–33)
CREAT SERPL-MCNC: 0.59 MG/DL (ref 0.5–1.4)
EOSINOPHIL # BLD AUTO: 0.07 K/UL (ref 0–0.51)
EOSINOPHIL NFR BLD: 1.1 % (ref 0–6.9)
ERYTHROCYTE [DISTWIDTH] IN BLOOD BY AUTOMATED COUNT: 54.4 FL (ref 35.9–50)
GLOBULIN SER CALC-MCNC: 3.6 G/DL (ref 1.9–3.5)
GLUCOSE BLD-MCNC: 131 MG/DL (ref 65–99)
GLUCOSE BLD-MCNC: 147 MG/DL (ref 65–99)
GLUCOSE BLD-MCNC: 153 MG/DL (ref 65–99)
GLUCOSE BLD-MCNC: 179 MG/DL (ref 65–99)
GLUCOSE SERPL-MCNC: 140 MG/DL (ref 65–99)
HCT VFR BLD AUTO: 37.6 % (ref 42–52)
HGB BLD-MCNC: 11.4 G/DL (ref 14–18)
IMM GRANULOCYTES # BLD AUTO: 0.05 K/UL (ref 0–0.11)
IMM GRANULOCYTES NFR BLD AUTO: 0.8 % (ref 0–0.9)
LYMPHOCYTES # BLD AUTO: 0.49 K/UL (ref 1–4.8)
LYMPHOCYTES NFR BLD: 8 % (ref 22–41)
MAGNESIUM SERPL-MCNC: 1.7 MG/DL (ref 1.5–2.5)
MCH RBC QN AUTO: 24.9 PG (ref 27–33)
MCHC RBC AUTO-ENTMCNC: 30.3 G/DL (ref 33.7–35.3)
MCV RBC AUTO: 82.1 FL (ref 81.4–97.8)
MONOCYTES # BLD AUTO: 0.29 K/UL (ref 0–0.85)
MONOCYTES NFR BLD AUTO: 4.8 % (ref 0–13.4)
NEUTROPHILS # BLD AUTO: 5.15 K/UL (ref 1.82–7.42)
NEUTROPHILS NFR BLD: 84.5 % (ref 44–72)
NRBC # BLD AUTO: 0 K/UL
NRBC BLD-RTO: 0 /100 WBC
PLATELET # BLD AUTO: 512 K/UL (ref 164–446)
PMV BLD AUTO: 11.5 FL (ref 9–12.9)
POTASSIUM SERPL-SCNC: 3.5 MMOL/L (ref 3.6–5.5)
PROT SERPL-MCNC: 6.5 G/DL (ref 6–8.2)
RBC # BLD AUTO: 4.58 M/UL (ref 4.7–6.1)
SODIUM SERPL-SCNC: 136 MMOL/L (ref 135–145)
WBC # BLD AUTO: 6.1 K/UL (ref 4.8–10.8)

## 2020-08-25 PROCEDURE — 80053 COMPREHEN METABOLIC PANEL: CPT

## 2020-08-25 PROCEDURE — A9270 NON-COVERED ITEM OR SERVICE: HCPCS | Performed by: INTERNAL MEDICINE

## 2020-08-25 PROCEDURE — 700111 HCHG RX REV CODE 636 W/ 250 OVERRIDE (IP): Performed by: NURSE PRACTITIONER

## 2020-08-25 PROCEDURE — 36415 COLL VENOUS BLD VENIPUNCTURE: CPT

## 2020-08-25 PROCEDURE — 99232 SBSQ HOSP IP/OBS MODERATE 35: CPT | Performed by: HOSPITALIST

## 2020-08-25 PROCEDURE — A9270 NON-COVERED ITEM OR SERVICE: HCPCS | Performed by: NURSE PRACTITIONER

## 2020-08-25 PROCEDURE — 700102 HCHG RX REV CODE 250 W/ 637 OVERRIDE(OP): Performed by: HOSPITALIST

## 2020-08-25 PROCEDURE — 85025 COMPLETE CBC W/AUTO DIFF WBC: CPT

## 2020-08-25 PROCEDURE — 97166 OT EVAL MOD COMPLEX 45 MIN: CPT

## 2020-08-25 PROCEDURE — 97162 PT EVAL MOD COMPLEX 30 MIN: CPT

## 2020-08-25 PROCEDURE — 83735 ASSAY OF MAGNESIUM: CPT

## 2020-08-25 PROCEDURE — 700102 HCHG RX REV CODE 250 W/ 637 OVERRIDE(OP): Performed by: INTERNAL MEDICINE

## 2020-08-25 PROCEDURE — 700102 HCHG RX REV CODE 250 W/ 637 OVERRIDE(OP): Performed by: NURSE PRACTITIONER

## 2020-08-25 PROCEDURE — 770001 HCHG ROOM/CARE - MED/SURG/GYN PRIV*

## 2020-08-25 PROCEDURE — 82962 GLUCOSE BLOOD TEST: CPT | Mod: 91

## 2020-08-25 PROCEDURE — A9270 NON-COVERED ITEM OR SERVICE: HCPCS | Performed by: HOSPITALIST

## 2020-08-25 PROCEDURE — 700111 HCHG RX REV CODE 636 W/ 250 OVERRIDE (IP): Performed by: HOSPITALIST

## 2020-08-25 RX ORDER — MAGNESIUM SULFATE HEPTAHYDRATE 40 MG/ML
2 INJECTION, SOLUTION INTRAVENOUS ONCE
Status: COMPLETED | OUTPATIENT
Start: 2020-08-25 | End: 2020-08-25

## 2020-08-25 RX ORDER — GABAPENTIN 300 MG/1
600 CAPSULE ORAL 3 TIMES DAILY
Status: DISCONTINUED | OUTPATIENT
Start: 2020-08-25 | End: 2020-08-31 | Stop reason: HOSPADM

## 2020-08-25 RX ORDER — POTASSIUM CHLORIDE 20 MEQ/1
20 TABLET, EXTENDED RELEASE ORAL ONCE
Status: COMPLETED | OUTPATIENT
Start: 2020-08-25 | End: 2020-08-25

## 2020-08-25 RX ADMIN — MAGNESIUM SULFATE IN WATER 2 G: 40 INJECTION, SOLUTION INTRAVENOUS at 11:25

## 2020-08-25 RX ADMIN — ONDANSETRON 4 MG: 2 INJECTION INTRAMUSCULAR; INTRAVENOUS at 16:04

## 2020-08-25 RX ADMIN — OXYCODONE HYDROCHLORIDE 15 MG: 10 TABLET ORAL at 16:08

## 2020-08-25 RX ADMIN — APIXABAN 5 MG: 5 TABLET, FILM COATED ORAL at 07:57

## 2020-08-25 RX ADMIN — GABAPENTIN 600 MG: 300 CAPSULE ORAL at 13:40

## 2020-08-25 RX ADMIN — OXYCODONE HYDROCHLORIDE 15 MG: 10 TABLET ORAL at 20:27

## 2020-08-25 RX ADMIN — OMEPRAZOLE 20 MG: 20 CAPSULE, DELAYED RELEASE ORAL at 07:57

## 2020-08-25 RX ADMIN — POTASSIUM CHLORIDE 20 MEQ: 1500 TABLET, EXTENDED RELEASE ORAL at 11:40

## 2020-08-25 RX ADMIN — GABAPENTIN 600 MG: 300 CAPSULE ORAL at 20:27

## 2020-08-25 RX ADMIN — MORPHINE SULFATE 15 MG: 15 TABLET, FILM COATED, EXTENDED RELEASE ORAL at 20:27

## 2020-08-25 RX ADMIN — THERA TABS 1 TABLET: TAB at 07:57

## 2020-08-25 RX ADMIN — ASPIRIN 81 MG: 81 TABLET, COATED ORAL at 07:57

## 2020-08-25 RX ADMIN — OXYCODONE HYDROCHLORIDE 15 MG: 10 TABLET ORAL at 07:57

## 2020-08-25 RX ADMIN — BENZOCAINE AND MENTHOL, UNSPECIFIED FORM 1 LOZENGE: 15; 3.6 LOZENGE ORAL at 07:57

## 2020-08-25 RX ADMIN — AZATHIOPRINE 50 MG: 50 TABLET ORAL at 20:27

## 2020-08-25 RX ADMIN — OXYCODONE HYDROCHLORIDE 10 MG: 10 TABLET ORAL at 11:57

## 2020-08-25 RX ADMIN — MORPHINE SULFATE 15 MG: 15 TABLET, FILM COATED, EXTENDED RELEASE ORAL at 07:57

## 2020-08-25 RX ADMIN — GABAPENTIN 500 MG: 400 CAPSULE ORAL at 07:57

## 2020-08-25 RX ADMIN — AZATHIOPRINE 50 MG: 50 TABLET ORAL at 07:57

## 2020-08-25 RX ADMIN — HYDROXYUREA 1500 MG: 500 CAPSULE ORAL at 20:29

## 2020-08-25 RX ADMIN — DOCUSATE SODIUM 50 MG AND SENNOSIDES 8.6 MG 2 TABLET: 8.6; 5 TABLET, FILM COATED ORAL at 07:57

## 2020-08-25 RX ADMIN — APIXABAN 5 MG: 5 TABLET, FILM COATED ORAL at 17:00

## 2020-08-25 ASSESSMENT — ENCOUNTER SYMPTOMS
HALLUCINATIONS: 0
SPEECH CHANGE: 0
VOMITING: 0
SPUTUM PRODUCTION: 0
NECK PAIN: 0
BACK PAIN: 0
PALPITATIONS: 0
COUGH: 0
ABDOMINAL PAIN: 0
SHORTNESS OF BREATH: 0
BLURRED VISION: 0
MYALGIAS: 1
DIZZINESS: 0
CHILLS: 0
NAUSEA: 0
FOCAL WEAKNESS: 0
FEVER: 0
EYE PAIN: 0
DOUBLE VISION: 0
HEADACHES: 0
PHOTOPHOBIA: 0

## 2020-08-25 ASSESSMENT — COGNITIVE AND FUNCTIONAL STATUS - GENERAL
MOVING TO AND FROM BED TO CHAIR: A LITTLE
PERSONAL GROOMING: A LITTLE
DAILY ACTIVITIY SCORE: 17
WALKING IN HOSPITAL ROOM: A LITTLE
MOBILITY SCORE: 19
SUGGESTED CMS G CODE MODIFIER DAILY ACTIVITY: CK
STANDING UP FROM CHAIR USING ARMS: A LITTLE
TOILETING: A LITTLE
SUGGESTED CMS G CODE MODIFIER MOBILITY: CK
DRESSING REGULAR UPPER BODY CLOTHING: A LITTLE
DRESSING REGULAR LOWER BODY CLOTHING: A LOT
HELP NEEDED FOR BATHING: A LITTLE
CLIMB 3 TO 5 STEPS WITH RAILING: A LOT
EATING MEALS: A LITTLE

## 2020-08-25 ASSESSMENT — ACTIVITIES OF DAILY LIVING (ADL): TOILETING: INDEPENDENT

## 2020-08-25 ASSESSMENT — GAIT ASSESSMENTS
DEVIATION: DECREASED BASE OF SUPPORT;BRADYKINETIC
ASSISTIVE DEVICE: FRONT WHEEL WALKER
GAIT LEVEL OF ASSIST: MINIMAL ASSIST

## 2020-08-25 ASSESSMENT — LIFESTYLE VARIABLES: SUBSTANCE_ABUSE: 0

## 2020-08-25 NOTE — PROGRESS NOTES
Pt A&O x' s 4, patient calls appropriately. Pt medicated for 8/10 pain. Denies N/V/D, tolerating diabetic diet. Wound vac to scrotum, no indication of leaks. +UO to   + via go, + flatus, + BM yesterday. Patient on RA, denies SOB or CP. POC discussed with pt, all questions and concerns have been addressed. Call light and personal belongings with in reach. Hourly rounding in place. All needs met at this time.

## 2020-08-25 NOTE — CARE PLAN
Problem: Communication  Goal: The ability to communicate needs accurately and effectively will improve  Outcome: PROGRESSING AS EXPECTED  Note: Pt communicates needs appropriately      Problem: Safety  Goal: Will remain free from injury  Outcome: PROGRESSING AS EXPECTED     Problem: Infection  Goal: Will remain free from infection  Outcome: PROGRESSING AS EXPECTED     Problem: Pain Management  Goal: Pain level will decrease to patient's comfort goal  Outcome: PROGRESSING AS EXPECTED     Problem: Skin Integrity  Goal: Risk for impaired skin integrity will decrease  Outcome: PROGRESSING AS EXPECTED

## 2020-08-25 NOTE — HEART FAILURE PROGRAM
Patient with prior diagnosis of ischemic cardiomyopathy who last saw Dr. Song in general cardiology clinic 9/13/19. EF was 40%. It is now down to 30%. Dr. Melendez was consulted out of deference to patient and daughter's wishes as they were very concerned about the drop.    Patient is currently admitted to T424 with Landon's gangrene of scrotum this is his primary reason for admission. Patient has been intolerant of GDMT for his reduced EF in the past. He has lupus and orthostatic hypotension for which he has been taking midodrine.     Both hospital medicine and cardiology notes say that there is no acute decompensation. It is therefore very likely that patient is stage B HF.    Please see below for guideline recommendations for Stage B HF. As one can see, prevention of progression to symptomatic is key in this stage and patient should be educated as such.     Stage B represents a place on the continuum of HF where a patient is asymptomatic (and therefore, by definition, not in acute heart failure), but has structural heart disease that puts him/her at risk for progressing to Stage C. If patient has even one symptomatic episode which would be the first experience of HF symptoms, he/she will be Stage C which is acute HF.

## 2020-08-25 NOTE — DISCHARGE PLANNING
Anticipated Discharge Disposition: TBD     Action:   ·  Discussed pt's POC in IDT rounds  · Per MD- Pt's wound vac anticipated to be dc on Thursday     Barriers to Discharge: pending PT/OT recommendations, pending OR clearance    Plan: Continue to collaborate with the pt, pt's family, and health care team to provide social and discharge support as needed.

## 2020-08-25 NOTE — PROGRESS NOTES
Hospital Medicine Daily Progress Note    Date of Service  8/25/2020    Chief Complaint  70 y.o. male admitted 8/22/2020 with scrotal pain    Hospital Course    *70-year-old male with past medical history of polycythemia, lupus, CAD, TIA, chronic systolic heart failure, PAF presented to the hospital on July 25, 2020 with a scrotal pain and swelling due to right scrotal abscess and Landon's gangrene.  Urology was emergently consulted and he underwent washout on July 25 with 3 more subsequent washout on July 29, July 31 and August 3 and wound VAC was placed.  Culture showed Abigail albicans and infectious disease consulted and he completed the course of Unasyn and fluconazole on August 17, 2020.  Patient transferred to [facility] from [facility] for plastic surgery consult.*      Interval Problem Update  No overnight events. Continues to have significant pain when trying to get out bed. Concerned about getting another infection and asking if he needs more antibiotics. He completed his course on 8/17.    Restarted anticoagulation yesterday. Tolerating without issue.     Consultants/Specialty  Urology  Infectious disease  Plastic surgery    Code Status  Full Code    Disposition  Pending PT/OT evaluation.     Review of Systems  Review of Systems   Constitutional: Positive for malaise/fatigue. Negative for chills and fever.   HENT: Negative for hearing loss and tinnitus.    Eyes: Negative for blurred vision, double vision, photophobia and pain.   Respiratory: Negative for cough, sputum production and shortness of breath.    Cardiovascular: Negative for chest pain, palpitations and leg swelling.   Gastrointestinal: Negative for abdominal pain, nausea and vomiting.   Genitourinary: Negative for dysuria, frequency and urgency.        Scrotal pain, improving   Musculoskeletal: Positive for myalgias (at skin graft site when mobilizing). Negative for back pain, joint pain and neck pain.   Skin: Negative for rash.    Neurological: Negative for dizziness, speech change, focal weakness and headaches.   Psychiatric/Behavioral: Negative for hallucinations and substance abuse.   All other systems reviewed and are negative.       Physical Exam  Temp:  [36.2 °C (97.1 °F)-37.3 °C (99.1 °F)] 36.3 °C (97.3 °F)  Pulse:  [75-86] 82  Resp:  [18-19] 19  BP: (100-119)/(55-75) 100/55  SpO2:  [93 %-98 %] 98 %    Physical Exam  Vitals signs reviewed.   Constitutional:       General: He is not in acute distress.     Appearance: He is not diaphoretic.      Interventions: Nasal cannula in place.   HENT:      Head: Normocephalic and atraumatic. No contusion.      Nose: Nose normal.      Mouth/Throat:      Mouth: Mucous membranes are moist.      Pharynx: Oropharynx is clear. No oropharyngeal exudate.   Eyes:      General:         Right eye: No discharge.         Left eye: No discharge.      Extraocular Movements: Extraocular movements intact.   Neck:      Musculoskeletal: No neck rigidity or muscular tenderness.   Cardiovascular:      Rate and Rhythm: Normal rate and regular rhythm.   Pulmonary:      Effort: Pulmonary effort is normal.      Breath sounds: No wheezing or rhonchi.   Abdominal:      General: Bowel sounds are normal. There is no distension.      Palpations: Abdomen is soft.      Tenderness: There is no abdominal tenderness. There is no guarding.   Genitourinary:     Comments: Tenderness on scrotal area.  Wound VAC is present on his scrotal area  Musculoskeletal:         General: No tenderness.      Right lower leg: No edema.      Left lower leg: No edema.   Skin:     General: Skin is dry.      Coloration: Skin is not jaundiced.      Comments: Right thigh with dressing in place, no drianage   Neurological:      General: No focal deficit present.      Mental Status: He is alert.      Cranial Nerves: No cranial nerve deficit.      Sensory: No sensory deficit.   Psychiatric:         Mood and Affect: Mood normal.         Behavior: Behavior  is cooperative.         Fluids    Intake/Output Summary (Last 24 hours) at 8/25/2020 0732  Last data filed at 8/25/2020 0025  Gross per 24 hour   Intake 240 ml   Output 800 ml   Net -560 ml       Laboratory  Recent Labs     08/23/20  0530 08/24/20  0732 08/25/20  0658   WBC 4.8 5.1 6.1   RBC 5.00 4.78 4.58*   HEMOGLOBIN 12.4* 12.1* 11.4*   HEMATOCRIT 42.2 39.9* 37.6*   MCV 84.4 83.5 82.1   MCH 24.8* 25.3* 24.9*   MCHC 29.4* 30.3* 30.3*   RDW 55.7* 55.2* 54.4*   PLATELETCT 786* 619* 512*   MPV 10.6 10.6 11.5     Recent Labs     08/24/20  0732 08/25/20  0658   SODIUM 137 136   POTASSIUM 3.7 3.5*   CHLORIDE 100 101   CO2 25 23   GLUCOSE 152* 140*   BUN 11 11   CREATININE 0.65 0.59   CALCIUM 9.0 8.9                   Imaging  No orders to display        Assessment/Plan  * 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 ID evaluated on last hospitalization  - s/p 2 week course of abx s/p multiple washouts  - s/p graft placement on 8/23  - pain control  - gabapentin increased  - PT/OT evaluation pending    Thrombocytosis (HCC)  Assessment & Plan  Platelet count is trending down  Continue hydroxyurea.    Stenosis of right carotid artery-Right CEA 3/21/18- (present on admission)  Assessment & Plan  Continue aspirin.      Hypokalemia- (present on admission)  Assessment & Plan  Mild at 3.5. In the setting of hypomag.  - monitor and replete K and Mg    Type 2 diabetes mellitus, with long-term current use of insulin ([facility])- (present on admission)  Assessment & Plan  With hyperglycemia. A1C uncontrolled at 8.6%. Takes insulin at home.  - resistant sliding scale + 5U TID with meals  - DM diet and hypoglycemia protocol    Chronic systolic heart failure (HCC)- (present on admission)  Assessment & Plan  EF 30%. No acute exacerbation and he is euvolemic  - cardiology evaluated, appreciate recs  - monitor clinically  - possible ICD placement outpatient?    Paroxysmal A-fib ([facility])-  (present on admission)  Assessment & Plan  Rate controlled. No longer requiring midodrine for BP.  - discussed with plastic surgery and urology, ok to resume anticoagulation    History of TIA (transient ischemic attack)- (present on admission)  Assessment & Plan  - Continue aspirin    SLE (systemic lupus erythematosus related syndrome) (HCC)- (present on admission)  Assessment & Plan  - Holding steroid in setting of infection; last dose was July 31. No signs of adrenal insufficiency.  - Continue azathioprine    Polycythemia vera (HCC)- (present on admission)  Assessment & Plan  Plt from 786 --> 512 today. Improving.   - Continue hydroxyurea  - outpatient follow up with heme/onc      VTE prophylaxis: home eliquis for afib

## 2020-08-25 NOTE — ASSESSMENT & PLAN NOTE
EF 30%. No acute exacerbation and he is euvolemic. Stable at this time  - cardiology evaluated, appreciate recs  - monitor clinically  - outpatient follow up  - there was mention of possible ICD placement outpatient

## 2020-08-25 NOTE — CONSULTS
Reason for Consult:  Asked by MIKE Mendoza* to see this patient with abnormal echocardiogram chronically  Patient's PCP: Malissa Thomson M.D.    CC: Here with  complaints Landon's gangrene    HPI: This is a 70-year-old gentleman with ischemic cardiomyopathy reduced EF chronically who was concerned about his heart health given his current infection concerns last saw Dr. Ricks a year ago was discussed that he was taking too much Midodrine for his orthostatic symptoms in the meantime is been able to wean himself off Midodrine but he presents for treatment of his serious  infection long-term chemotherapy no sign for CHF decompensation    Medications / Drug list prior to admission:  No current facility-administered medications on file prior to encounter.      Current Outpatient Medications on File Prior to Encounter   Medication Sig Dispense Refill   • azaTHIOprine (IMURAN) 50 MG Tab Take 50 mg by mouth 2 Times a Day. TAKE 1 TABLET BY MOUTH TWICE A DAY     • midodrine (PROAMATINE) 5 MG Tab Take 15 mg by mouth every evening.     • predniSONE (DELTASONE) 20 MG Tab Take 40 mg by mouth every day.     • omeprazole (PRILOSEC) 20 MG delayed-release capsule Take 1 Cap by mouth every day. 30 Cap 1   • finasteride (PROSCAR) 5 MG Tab Take 5 mg by mouth every evening.     • Insulin Degludec (TRESIBA FLEXTOUCH) 200 UNIT/ML Solution Pen-injector Inject 18 Units as instructed every evening.     • tamsulosin (FLOMAX) 0.4 MG capsule Take 0.8 mg by mouth every evening.     • naproxen (NAPROSYN) 250 MG Tab Take 750 mg by mouth 2 times a day as needed. Indications: Pain     • apixaban (ELIQUIS) 5mg Tab Take 1 Tab by mouth 2 Times a Day. 180 Tab 3   • aspirin EC (ECOTRIN) 81 MG Tablet Delayed Response Take 1 Tab by mouth every day. 30 Tab    • therapeutic multivitamin-minerals (THERAGRAN-M) Tab Take 1 Tab by mouth every day.     • hydroxyurea (HYDREA) 500 MG Cap Take 500 mg by mouth See Admin Instructions.  Alternating doses  Take 500 mg once daily then the next day takes 500 mg twice daily  Then repeat         Current list of administered Medications:    Current Facility-Administered Medications:   •  acetaminophen (TYLENOL) tablet 1,000 mg, 1,000 mg, Oral, TID PRN, Geo Hernandez M.D., 1,000 mg at 08/24/20 1622  •  apixaban (ELIQUIS) tablet 5 mg, 5 mg, Oral, BID, Geo Hernandez M.D., 5 mg at 08/24/20 1700  •  ondansetron (ZOFRAN ODT) dispertab 4 mg, 4 mg, Oral, Q4HRS PRN, Ara Randhawa, A.P.R.N.  •  ondansetron (ZOFRAN) syringe/vial injection 4 mg, 4 mg, Intravenous, Q4HRS PRN, Ara Randhawa, A.P.R.N.  •  senna-docusate (PERICOLACE or SENOKOT S) 8.6-50 MG per tablet 2 Tab, 2 Tab, Oral, BID, 2 Tab at 08/24/20 0724 **AND** polyethylene glycol/lytes (MIRALAX) PACKET 1 Packet, 1 Packet, Oral, BID **AND** magnesium hydroxide (MILK OF MAGNESIA) suspension 30 mL, 30 mL, Oral, QDAY PRN **AND** bisacodyl (DULCOLAX) suppository 10 mg, 10 mg, Rectal, QDAY PRN, Ara Randhawa, A.P.R.N.  •  insulin lispro (HumaLOG) injection, 5 Units, Subcutaneous, TID AC, 4 Units at 08/24/20 1247 **AND** insulin lispro (HumaLOG) injection, 3-14 Units, Subcutaneous, 4X/DAY ACHS, Stopped at 08/22/20 1700 **AND** POC Blood Glucose, , , Q AC AND BEDTIME(S) **AND** NOTIFY MD and PharmD, , , Once **AND** glucose 4 g chewable tablet 16 g, 16 g, Oral, Q15 MIN PRN **AND** dextrose 50% (D50W) injection 50 mL, 50 mL, Intravenous, Q15 MIN PRN, Ara F Randhawa, A.P.R.N.  •  albuterol inhaler 2 Puff, 2 Puff, Inhalation, Q4HRS PRN, Ara Randhawa A.P.R.N.  •  aspirin EC (ECOTRIN) tablet 81 mg, 81 mg, Oral, DAILY, Ara Randhawa A.P.R.N., 81 mg at 08/24/20 0724  •  azaTHIOprine (IMURAN) tablet 50 mg, 50 mg, Oral, BID, Ara Randhawa A.P.R.N., 50 mg at 08/24/20 0725  •  benzocaine-menthol (CEPACOL) lozenge 1 Lozenge, 1 Lozenge, Mouth/Throat, Q2HRS PRN, Ara Randhawa A.P.R.N., 1 Lozenge at 08/24/20 1625  •  calcium carbonate (TUMS) chewable  tab 500-1,000 mg, 500-1,000 mg, Oral, Q6HRS PRN, Ara Randhawa, A.P.R.N.  •  gabapentin (NEURONTIN) capsule 500 mg, 500 mg, Oral, TID, Ara Randhawa, A.P.R.N., 500 mg at 08/24/20 1316  •  hydroxyurea (HYDREA) capsule 1,500 mg, 1,500 mg, Oral, Nightly, Ara Randhawa A.P.R.N., 1,500 mg at 08/23/20 2154  •  hyoscyamine-maalox plus-lidocaine viscous (GI COCKTAIL) oral susp 15 mL, 15 mL, Oral, Q4HRS PRN, Ara Randhawa, A.P.R.N.  •  lidocaine (XYLOCAINE) 2 % injection 20 mL, 20 mL, Other, Q48HRS PRN, Ara Randhawa, A.P.R.N.  •  LORazepam (ATIVAN) injection 0.5 mg, 0.5 mg, Intravenous, QDAY PRN, Ara Randhawa, A.P.R.N.  •  Maalox Plus - Diphenhydramine - Lidocaine (MBX Oral Suspension), 5 mL, Swish & Swallow, Q6HRS PRN, Ara Randhawa A.P.R.N.  •  miconazole 2%-zinc oxide (Steven) topical cream, , Topical, PRN, Ara Randhawa, A.P.R.N.  •  morphine (pf) 10 mg/mL injection 5 mg, 5 mg, Intravenous, QDAY PRN, Ara Randhawa, A.P.R.N.  •  morphine (pf) 4 MG/ML injection 2-4 mg, 2-4 mg, Intravenous, Q2HRS PRN, Ara Randhawa A.P.R.N., 4 mg at 08/23/20 2049  •  morphine ER (MS CONTIN) tablet 15 mg, 15 mg, Oral, Q12HRS, Ara Randhawa, A.P.R.N., 15 mg at 08/24/20 0724  •  multivitamin (THERAGRAN) tablet 1 Tab, 1 Tab, Oral, DAILY, Ara F Randhawa, A.P.R.N., 1 Tab at 08/24/20 0724  •  omeprazole (PRILOSEC) capsule 20 mg, 20 mg, Oral, DAILY, Ara Randhawa A.P.R.N., 20 mg at 08/24/20 0724  •  oxyCODONE immediate release (ROXICODONE) tablet 10-15 mg, 10-15 mg, Oral, Q4HRS PRN, Leighann Amado A.P.R.N., 15 mg at 08/24/20 1621    Past Medical History:   Diagnosis Date   • Anesthesia     resistant to pain meds   • Cancer (HCC)     polycythemia vera   • Diabetes (HCC)     insulin and oral meds   • Family history of punctured lung 2002    left lung   • Heart attack (HCC) 03/2017   • Hemorrhagic disorder (HCC)     on blood thinners   • High cholesterol    • Ischemic cardiomyopathy    • Kidney stones     hx of kidney stones   •  Orthostatic hypotension 3/29/2018   • Pain     headache pain   • Polycythemia vera(238.4)    • Stroke (HCC) 2016    r/t afib; eye issues resolved afterward   • Urinary bladder disorder     enlarged prostate, weak stream, difficulty urinating   • Vertigo        Past Surgical History:   Procedure Laterality Date   • SPLIT THICKNESS SKIN GRAFT N/A 8/23/2020    Procedure: APPLICATION, GRAFT, SKIN, SPLIT-THICKNESS;  Surgeon: Elisa Craig M.D.;  Location: [facility];  Service: Plastics   • SKIN ABSCESS INCISION AND DRAINAGE Right 8/3/2020    Procedure: INCISION AND DRAINAGE - SCROTAL WOUND - WOUND VAC PLACEMENT;  Surgeon: Jaylen Hayes M.D.;  Location: [facility];  Service: Urology   • PB EXPLORE SCROTUM Right 7/31/2020    Procedure: EXPLORATION, SCROTUM - FOR WASH OUT OF SCROTAL WOUND & WOUND VAC PLACEMENT;  Surgeon: Ferny Rosales M.D.;  Location: [facility];  Service: Urology   • PB EXPLORE SCROTUM Right 7/29/2020    Procedure: EXPLORATION, SCROTUM - FOR I & D OF SCROTAL AND  GROIN WOUND;  Surgeon: Donta Viera M.D.;  Location: [facility];  Service: Urology   • PB INCIS/DRAIN SCROTUM/TESTIS,EPIDIDYM Right 7/25/2020    Procedure: INCISION AND DRAINAGE, SCROTUM;  Surgeon: Nick Negro M.D.;  Location: [facility];  Service: Urology   • TEMPORAL ARTERY BIOPSY Right 6/26/2018    Procedure: TEMPORAL ARTERY BIOPSY;  Surgeon: Rajendra Aguilar M.D.;  Location: SURGERY SAME DAY [facility];  Service: General   • CAROTID ENDARTERECTOMY Right 3/21/2018    Procedure: CAROTID ENDARTERECTOMY- W/EEG MONITORING;  Surgeon: Benny Morfin M.D.;  Location: [facility];  Service: General   • APPENDECTOMY     • CATH PLACEMENT      right arm   • LAMINOTOMY      diskectomy; C4   • OTHER CARDIAC SURGERY      stents x 3       History reviewed. No pertinent family history.  Patient family history was personally reviewed, no pertinent  "family history to current presentation    Social History     Tobacco Use   • Smoking status: Never Smoker   • Smokeless tobacco: Never Used   Substance Use Topics   • Alcohol use: No   • Drug use: No       ALLERGIES:  Allergies   Allergen Reactions   • Doxycycline      Swelling lips and difficulties swallowing   • Beta Adrenergic Blockers Unspecified     dizziness   • Metformin Unspecified and Palpitations     Cardiac effects  \"Similar to a heart attack, I was hospitalized\"   • Simvastatin      Other reaction(s): Other (Specify with Comments)  Myalgias  Myalgias   • Statins [Hmg-Coa-R Inhibitors]      Muscle ache, joint pain       Review of systems:  A complete review of symptoms was reviewed with patient. This is reviewed in H&P and PMH. ALL OTHERS reviewed and negative    Physical exam:  Patient Vitals for the past 24 hrs:   BP Temp Temp src Pulse Resp SpO2   08/24/20 1536 119/71 37.3 °C (99.1 °F) Temporal 81 18 94 %   08/24/20 1158 119/75 36.4 °C (97.6 °F) Temporal 79 18 95 %   08/24/20 0805 113/71 36.2 °C (97.1 °F) Temporal 75 18 98 %   08/24/20 0345 101/59 35.8 °C (96.5 °F) Temporal 65 17 100 %   08/23/20 2316 127/74 36.3 °C (97.4 °F) Temporal 76 17 100 %   08/23/20 2146 132/72 36.7 °C (98 °F) Temporal 70 17 100 %   08/23/20 2110 140/80 36.5 °C (97.7 °F) Temporal 68 17 100 %   08/23/20 2040 142/85 36.5 °C (97.7 °F) Temporal 60 17 100 %   08/23/20 2010 127/87 36.1 °C (97 °F) Temporal 76 17 99 %   08/23/20 1945 124/66 -- -- 64 17 93 %     General: No acute distress.   EYES: no jaundice  HEENT: OP clear   Neck: No bruits No JVD.   CVS: RRR. S1 + S2. No M/R/G  Resp: Normal respiratory effort  Abdomen: Soft, NT, ND,  Skin: Grossly nothing acute no obvious rashes  Neurological: Alert, Moves all extremities, no cranial nerve defects on limited exam  Extremities: Pulse 2+ in b/l LE.  Mild edema. No cyanosis.       Data:  Laboratory studies personally reviewed by me:  Recent Results (from the past 24 hour(s)) "   ACCU-CHEK GLUCOSE    Collection Time: 08/23/20  9:51 PM   Result Value Ref Range    Glucose - Accu-Ck 98 65 - 99 mg/dL   ACCU-CHEK GLUCOSE    Collection Time: 08/24/20  6:28 AM   Result Value Ref Range    Glucose - Accu-Ck 132 (H) 65 - 99 mg/dL   CBC WITH DIFFERENTIAL    Collection Time: 08/24/20  7:32 AM   Result Value Ref Range    WBC 5.1 4.8 - 10.8 K/uL    RBC 4.78 4.70 - 6.10 M/uL    Hemoglobin 12.1 (L) 14.0 - 18.0 g/dL    Hematocrit 39.9 (L) 42.0 - 52.0 %    MCV 83.5 81.4 - 97.8 fL    MCH 25.3 (L) 27.0 - 33.0 pg    MCHC 30.3 (L) 33.7 - 35.3 g/dL    RDW 55.2 (H) 35.9 - 50.0 fL    Platelet Count 619 (H) 164 - 446 K/uL    MPV 10.6 9.0 - 12.9 fL    Neutrophils-Polys 85.40 (H) 44.00 - 72.00 %    Lymphocytes 6.70 (L) 22.00 - 41.00 %    Monocytes 3.70 0.00 - 13.40 %    Eosinophils 1.60 0.00 - 6.90 %    Basophils 1.40 0.00 - 1.80 %    Immature Granulocytes 1.20 (H) 0.00 - 0.90 %    Nucleated RBC 0.00 /100 WBC    Neutrophils (Absolute) 4.34 1.82 - 7.42 K/uL    Lymphs (Absolute) 0.34 (L) 1.00 - 4.80 K/uL    Monos (Absolute) 0.19 0.00 - 0.85 K/uL    Eos (Absolute) 0.08 0.00 - 0.51 K/uL    Baso (Absolute) 0.07 0.00 - 0.12 K/uL    Immature Granulocytes (abs) 0.06 0.00 - 0.11 K/uL    NRBC (Absolute) 0.00 K/uL   Comp Metabolic Panel    Collection Time: 08/24/20  7:32 AM   Result Value Ref Range    Sodium 137 135 - 145 mmol/L    Potassium 3.7 3.6 - 5.5 mmol/L    Chloride 100 96 - 112 mmol/L    Co2 25 20 - 33 mmol/L    Anion Gap 12.0 7.0 - 16.0    Glucose 152 (H) 65 - 99 mg/dL    Bun 11 8 - 22 mg/dL    Creatinine 0.65 0.50 - 1.40 mg/dL    Calcium 9.0 8.5 - 10.5 mg/dL    AST(SGOT) 11 (L) 12 - 45 U/L    ALT(SGPT) 8 2 - 50 U/L    Alkaline Phosphatase 47 30 - 99 U/L    Total Bilirubin 0.4 0.1 - 1.5 mg/dL    Albumin 2.8 (L) 3.2 - 4.9 g/dL    Total Protein 6.6 6.0 - 8.2 g/dL    Globulin 3.8 (H) 1.9 - 3.5 g/dL    A-G Ratio 0.7 g/dL   MAGNESIUM    Collection Time: 08/24/20  7:32 AM   Result Value Ref Range    Magnesium 1.8 1.5 - 2.5  mg/dL   ESTIMATED GFR    Collection Time: 08/24/20  7:32 AM   Result Value Ref Range    GFR If African American >60 >60 mL/min/1.73 m 2    GFR If Non African American >60 >60 mL/min/1.73 m 2   ACCU-CHEK GLUCOSE    Collection Time: 08/24/20 11:54 AM   Result Value Ref Range    Glucose - Accu-Ck 167 (H) 65 - 99 mg/dL   ACCU-CHEK GLUCOSE    Collection Time: 08/24/20  4:20 PM   Result Value Ref Range    Glucose - Accu-Ck 143 (H) 65 - 99 mg/dL       Imaging:  No orders to display           EKG : personally reviewed by me sinus rhythm    Echocardiogram shows EF of 30% global epithesis    All pertinent features of laboratory and imaging reviewed including primary images where applicable      Active Problems:    Stenosis of right carotid artery-Right CEA 3/21/18 POA: Yes      Overview: 90% right carotid stenosis      Right CEA 3/21      Benny Morfin MD - vascular surgery    Landon's gangrene of scrotum POA: Yes    Paroxysmal A-fib (HCC) POA: Yes    Systolic heart failure (HCC) POA: Unknown    Type 2 diabetes mellitus, with long-term current use of insulin (HCC) POA: Yes      Overview: Chronic condition treated with Glipizide and Lantus.      Sliding scale insulin during acute hospitalization.          Polycythemia vera (HCC) POA: Yes    SLE (systemic lupus erythematosus related syndrome) (HCC) (Chronic) POA: Yes    TIA (transient ischemic attack) POA: Yes  Resolved Problems:    * No resolved hospital problems. *      Assessment / Plan:  CHF with severely reduced ejection fraction this has been developing over the past year and is a stable we discussed the need to keep him volume neutral in the setting of severe infection which appeared to be the case because of his hypotension in the past has not been able to tolerate medical therapy but now that he is off the Midodrine hopefully long-term he can get on low-dose CHF regimen otherwise he may have to consider ICD both of this is outpatient evaluation and treatment will make  sure that he has close follow-up with his primary cardiologist he and his daughter at the bedside are comfortable with this plan cardiology will sign off.      I personally discussed his case with  MIKE Mendoza*      It is my pleasure to participate in the care of Mr. Schulte.  Please do not hesitate to contact me with questions or concerns.    Ken Melendez MD PhD [facility]  Cardiologist [facility]    8/24/2020    Please note that this dictation was created using voice recognition software. There may be errors I did not discover before finalizing the note.

## 2020-08-25 NOTE — PROGRESS NOTES
Received report from AM RN; assumed care. Daughter bedside at change of shift. A&O x 4. VSS. 93% on RA, 1L NC placed for patient comfort. Patient denied SOB, numbness, tingling, nausea, vomiting. Medicated for pain; see MAR. Pain to LFA, scrotal area, RLE from skin graft site. Penile tenderness reported with go movement. Abdomen soft, non-tender. Wound vac dressing to right scrotum, CDI. Small serosang drainage noted in cannister. Right upper anterior thigh dressing, CDI; reinforced. Patient up x 1-2 person assistance (due to weight of wound vac) to/from bathroom with FWW; slow steady gait noted. + void, yellow urine in go catheter. + flatus. LBM 08/24/2020 (Large, brown, soft). Patient tolerating diet. HFR; bed alarm refused. 3 rails up for pillow positioning. Trapeze bar in place. Patient stated wouldn't get OOB unassisted. Patient compliant with requests/utilizes call light appropriately. POC discussed. Questions answered. Bed locked/lowest position. Call light/personal belongings within reach. All needs met/patient resting at present time.

## 2020-08-25 NOTE — CARE PLAN
Problem: Safety  Goal: Will remain free from injury  Outcome: PROGRESSING AS EXPECTED  HFR d/t criteria. Patient compliant with not getting OOB unassisted. Education/risks provided to patient. Bed alarm removed d/t discomfort.     Problem: Bowel/Gastric:  Goal: Normal bowel function is maintained or improved  Outcome: PROGRESSING AS EXPECTED  Normoactive BS x 4 noted. Patient tolerating diet. Large BM 08/24/2020.     Problem: Pain Management  Goal: Pain level will decrease to patient's comfort goal  Outcome: PROGRESSING AS EXPECTED  Medicated for pain; see MAR.     Problem: Skin Integrity  Goal: Risk for impaired skin integrity will decrease  Outcome: PROGRESSING AS EXPECTED  Waffle overlay, mediplex to sacrum, trapeze bar in place for patient to reposition on own. Intermittently assisting patient with offloading with pillows.     Problem: Mobility  Goal: Risk for activity intolerance will decrease  Outcome: PROGRESSING AS EXPECTED  Patient up 1-2 person assistance with FWW. Steady slow gait noted to/from bathroom.

## 2020-08-25 NOTE — THERAPY
Occupational Therapy   Initial Evaluation     Patient Name: Francesco Schulte  Age:  70 y.o., Sex:  male  Medical Record #: [id number]  Today's Date: 8/25/2020     Precautions  Comments: wound vac right scrotum, skin graft to right thigh    Assessment  Patient is 70 y.o. male with a diagnosis of Forniers gangrene of scrotum, s/p skin grafting, wound vac in place.  Additional factors influencing patient status / progress: history of agent orange exposure, good family support at home,weakness..      Plan    Recommend Occupational Therapy 3 times per week until therapy goals are met for the following treatments:  Adaptive Equipment, Self Care/Activities of Daily Living, Therapeutic Activities and Therapeutic Exercises.    DC Equipment Recommendations: Unable to determine at this time  Discharge Recommendations: Recommend home health for continued occupational therapy services     Subjective    Self limited throughout eval, mod encouragement required for limited mobility piece of eval.     Objective       08/25/20 0830   Total Time Spent   Total Time Spent (Mins) 43   Charge Group   OT Evaluation OT Evaluation Mod   Initial Contact Note    Initial Contact Note Order Received and Verified, Occupational Therapy Evaluation in Progress with Full Report to Follow.   Prior Living Situation   Prior Services None   Housing / Facility 1 Story House   Steps Into Home 1   Steps In Home 0   Elevator No   Bathroom Set up Walk In Shower;Grab Bars   Lives with - Patient's Self Care Capacity Spouse   Comments 2 local daughters who can assist   Prior Level of ADL Function   Self Feeding Independent   Grooming / Hygiene Independent   Bathing Independent   Dressing Independent   Toileting Independent   Prior Level of IADL Function   Medication Management Independent   Laundry Independent   Kitchen Mobility Independent   Finances Independent   Home Management Requires Assist   Shopping Independent   Prior Level Of Mobility Independent  Without Device in Home   Occupation (Pre-Hospital Vocational)   (retired )   History of Falls   History of Falls No   Precautions   Comments wound vac right scrotum, skin graft to right thigh   Vitals   O2 Delivery Device None - Room Air   Pain 0 - 10 Group   Therapist Pain Assessment During Activity;Nurse Notified;4  (graft site)   Cognition    Level of Consciousness Alert   Comments verbose, self limiting behavior observed   Passive ROM Upper Body   Passive ROM Upper Body WDL   Active ROM Upper Body   Active ROM Upper Body  WDL   Dominant Hand Right   Strength Upper Body   Upper Body Strength  WDL   Sensation Upper Body   Upper Extremity Sensation  WDL   Upper Body Muscle Tone   Upper Body Muscle Tone  WDL   Coordination Upper Body   Coordination WDL   Balance Assessment   Sitting Balance (Static) Fair   Sitting Balance (Dynamic) Fair   Standing Balance (Static)   (refused)   Weight Shift Sitting Poor  (guards due to pain at graft site)   Bed Mobility    Supine to Sit Moderate Assist   Sit to Supine Moderate Assist   Scooting Moderate Assist   ADL Assessment   Eating Supervision   Grooming Supervision;Seated   Bathing   (NT, refused)   Upper Body Dressing Minimal Assist   Lower Body Dressing Maximal Assist   Toileting   (NT, refused)   How much help from another person does the patient currently need...   Putting on and taking off regular lower body clothing? 2   Bathing (including washing, rinsing, and drying)? 3   Toileting, which includes using a toilet, bedpan, or urinal? 3   Putting on and taking off regular upper body clothing? 3   Taking care of personal grooming such as brushing teeth? 3   Eating meals? 3   6 Clicks Daily Activity Score 17   Functional Mobility   Sit to Stand Refused   Bed, Chair, Wheelchair Transfer Refused   Toilet Transfers Refused   Tub / Shower Transfers Refused   Visual Perception   Visual Perception  WDL   Patient / Family Goals   Patient / Family Goal #1 regain independence    Short Term Goals   Short Term Goal # 1 SBA necessary functional transfers   Short Term Goal # 2 SBA toiletting tasks   Short Term Goal # 3 set up for seated dressing tasks, appropriate use of AE   Education Group   Role of Occupational Therapist Patient Response Patient;Acceptance;Explanation;Demonstration;Reinforcement Needed;Verbal Demonstration   Problem List   Problem List Decreased Active Daily Living Skills;Decreased Functional Mobility;Decreased Activity Tolerance;Limited Knowledge of Post Op Precautions   Anticipated Discharge Equipment and Recommendations   DC Equipment Recommendations Unable to determine at this time   Discharge Recommendations Recommend home health for continued occupational therapy services   Interdisciplinary Plan of Care Collaboration   IDT Collaboration with  Nursing   Patient Position at End of Therapy In Bed;Call Light within Reach;Tray Table within Reach;Phone within Reach   Collaboration Comments report given   Session Information   Date / Session Number  8/25,1( 1/3,8/31)   Priority 2

## 2020-08-25 NOTE — PROGRESS NOTES
Pt A& x's 4, VSS, pain controlled with PRN's at this time. Pt has wound vac to scrotum, no indication of leaks, DIP to R thigh graft site, CDI. Pt is on 1 L NC for comfort, denies any SOB or CP. Pt has +UO to go, + flatus, +BM today. Pt is up with 1 assist and FWW, tolerates well. POC discussed with pt, all questions and concerns have been addressed. Bed in locked, lowest position, call light and personal items within reach.

## 2020-08-26 LAB
ANION GAP SERPL CALC-SCNC: 11 MMOL/L (ref 7–16)
BASOPHILS # BLD AUTO: 1.8 % (ref 0–1.8)
BASOPHILS # BLD: 0.1 K/UL (ref 0–0.12)
BUN SERPL-MCNC: 14 MG/DL (ref 8–22)
CALCIUM SERPL-MCNC: 9.1 MG/DL (ref 8.5–10.5)
CHLORIDE SERPL-SCNC: 104 MMOL/L (ref 96–112)
CO2 SERPL-SCNC: 25 MMOL/L (ref 20–33)
CREAT SERPL-MCNC: 0.63 MG/DL (ref 0.5–1.4)
EOSINOPHIL # BLD AUTO: 0.08 K/UL (ref 0–0.51)
EOSINOPHIL NFR BLD: 1.5 % (ref 0–6.9)
ERYTHROCYTE [DISTWIDTH] IN BLOOD BY AUTOMATED COUNT: 55.4 FL (ref 35.9–50)
GLUCOSE BLD-MCNC: 117 MG/DL (ref 65–99)
GLUCOSE BLD-MCNC: 121 MG/DL (ref 65–99)
GLUCOSE BLD-MCNC: 159 MG/DL (ref 65–99)
GLUCOSE BLD-MCNC: 165 MG/DL (ref 65–99)
GLUCOSE SERPL-MCNC: 130 MG/DL (ref 65–99)
HCT VFR BLD AUTO: 36.1 % (ref 42–52)
HGB BLD-MCNC: 11.2 G/DL (ref 14–18)
IMM GRANULOCYTES # BLD AUTO: 0.06 K/UL (ref 0–0.11)
IMM GRANULOCYTES NFR BLD AUTO: 1.1 % (ref 0–0.9)
LYMPHOCYTES # BLD AUTO: 0.7 K/UL (ref 1–4.8)
LYMPHOCYTES NFR BLD: 12.9 % (ref 22–41)
MAGNESIUM SERPL-MCNC: 1.9 MG/DL (ref 1.5–2.5)
MCH RBC QN AUTO: 25.5 PG (ref 27–33)
MCHC RBC AUTO-ENTMCNC: 31 G/DL (ref 33.7–35.3)
MCV RBC AUTO: 82.2 FL (ref 81.4–97.8)
MONOCYTES # BLD AUTO: 0.3 K/UL (ref 0–0.85)
MONOCYTES NFR BLD AUTO: 5.5 % (ref 0–13.4)
NEUTROPHILS # BLD AUTO: 4.18 K/UL (ref 1.82–7.42)
NEUTROPHILS NFR BLD: 77.2 % (ref 44–72)
NRBC # BLD AUTO: 0 K/UL
NRBC BLD-RTO: 0 /100 WBC
PLATELET # BLD AUTO: 434 K/UL (ref 164–446)
PMV BLD AUTO: 10.7 FL (ref 9–12.9)
POTASSIUM SERPL-SCNC: 3.9 MMOL/L (ref 3.6–5.5)
RBC # BLD AUTO: 4.39 M/UL (ref 4.7–6.1)
SODIUM SERPL-SCNC: 140 MMOL/L (ref 135–145)
WBC # BLD AUTO: 5.4 K/UL (ref 4.8–10.8)

## 2020-08-26 PROCEDURE — A9270 NON-COVERED ITEM OR SERVICE: HCPCS | Performed by: NURSE PRACTITIONER

## 2020-08-26 PROCEDURE — 83735 ASSAY OF MAGNESIUM: CPT

## 2020-08-26 PROCEDURE — 700102 HCHG RX REV CODE 250 W/ 637 OVERRIDE(OP): Performed by: HOSPITALIST

## 2020-08-26 PROCEDURE — 99232 SBSQ HOSP IP/OBS MODERATE 35: CPT | Performed by: HOSPITALIST

## 2020-08-26 PROCEDURE — 700111 HCHG RX REV CODE 636 W/ 250 OVERRIDE (IP): Performed by: NURSE PRACTITIONER

## 2020-08-26 PROCEDURE — A9270 NON-COVERED ITEM OR SERVICE: HCPCS | Performed by: INTERNAL MEDICINE

## 2020-08-26 PROCEDURE — 770001 HCHG ROOM/CARE - MED/SURG/GYN PRIV*

## 2020-08-26 PROCEDURE — 700102 HCHG RX REV CODE 250 W/ 637 OVERRIDE(OP): Performed by: NURSE PRACTITIONER

## 2020-08-26 PROCEDURE — A9270 NON-COVERED ITEM OR SERVICE: HCPCS | Performed by: HOSPITALIST

## 2020-08-26 PROCEDURE — 82962 GLUCOSE BLOOD TEST: CPT | Mod: 91

## 2020-08-26 PROCEDURE — 700102 HCHG RX REV CODE 250 W/ 637 OVERRIDE(OP): Performed by: INTERNAL MEDICINE

## 2020-08-26 PROCEDURE — 85025 COMPLETE CBC W/AUTO DIFF WBC: CPT

## 2020-08-26 PROCEDURE — 80048 BASIC METABOLIC PNL TOTAL CA: CPT

## 2020-08-26 PROCEDURE — 36415 COLL VENOUS BLD VENIPUNCTURE: CPT

## 2020-08-26 RX ADMIN — NAPROXEN 750 MG: 250 TABLET ORAL at 15:28

## 2020-08-26 RX ADMIN — DOCUSATE SODIUM 50 MG AND SENNOSIDES 8.6 MG 2 TABLET: 8.6; 5 TABLET, FILM COATED ORAL at 08:33

## 2020-08-26 RX ADMIN — OXYCODONE HYDROCHLORIDE 10 MG: 10 TABLET ORAL at 06:37

## 2020-08-26 RX ADMIN — GABAPENTIN 600 MG: 300 CAPSULE ORAL at 08:33

## 2020-08-26 RX ADMIN — HYDROXYUREA 1500 MG: 500 CAPSULE ORAL at 20:57

## 2020-08-26 RX ADMIN — AZATHIOPRINE 50 MG: 50 TABLET ORAL at 08:26

## 2020-08-26 RX ADMIN — ASPIRIN 81 MG: 81 TABLET, COATED ORAL at 08:33

## 2020-08-26 RX ADMIN — OXYCODONE HYDROCHLORIDE 10 MG: 10 TABLET ORAL at 12:09

## 2020-08-26 RX ADMIN — GABAPENTIN 600 MG: 300 CAPSULE ORAL at 20:57

## 2020-08-26 RX ADMIN — MORPHINE SULFATE 15 MG: 15 TABLET, FILM COATED, EXTENDED RELEASE ORAL at 08:33

## 2020-08-26 RX ADMIN — OXYCODONE HYDROCHLORIDE 10 MG: 10 TABLET ORAL at 17:00

## 2020-08-26 RX ADMIN — GABAPENTIN 600 MG: 300 CAPSULE ORAL at 14:19

## 2020-08-26 RX ADMIN — OXYCODONE HYDROCHLORIDE 10 MG: 10 TABLET ORAL at 21:00

## 2020-08-26 RX ADMIN — APIXABAN 5 MG: 5 TABLET, FILM COATED ORAL at 08:33

## 2020-08-26 RX ADMIN — MORPHINE SULFATE 15 MG: 15 TABLET, FILM COATED, EXTENDED RELEASE ORAL at 20:58

## 2020-08-26 RX ADMIN — OMEPRAZOLE 20 MG: 20 CAPSULE, DELAYED RELEASE ORAL at 08:00

## 2020-08-26 RX ADMIN — APIXABAN 5 MG: 5 TABLET, FILM COATED ORAL at 17:01

## 2020-08-26 RX ADMIN — AZATHIOPRINE 50 MG: 50 TABLET ORAL at 20:58

## 2020-08-26 RX ADMIN — ONDANSETRON 4 MG: 2 INJECTION INTRAMUSCULAR; INTRAVENOUS at 12:09

## 2020-08-26 RX ADMIN — THERA TABS 1 TABLET: TAB at 08:33

## 2020-08-26 ASSESSMENT — ENCOUNTER SYMPTOMS
MYALGIAS: 1
NECK PAIN: 0
COUGH: 0
PALPITATIONS: 0
SPEECH CHANGE: 0
VOMITING: 0
FEVER: 0
BLURRED VISION: 0
HEADACHES: 0
CHILLS: 0
ABDOMINAL PAIN: 0
NAUSEA: 0
SPUTUM PRODUCTION: 0
PHOTOPHOBIA: 0
FOCAL WEAKNESS: 0
DIZZINESS: 0
HALLUCINATIONS: 0
SHORTNESS OF BREATH: 0
EYE PAIN: 0
DOUBLE VISION: 0

## 2020-08-26 ASSESSMENT — LIFESTYLE VARIABLES: SUBSTANCE_ABUSE: 0

## 2020-08-26 NOTE — PROGRESS NOTES
Received report from day shift RN.   Assessment complete. VSS  A+Ox4, patient calls appropriately.   Patient reports 8/10 pain to wound, medicated per MAR.   Patient ambulates with one person assist  Denies N/V, tolerating a diabetic diet.   + flatus  + void  Patient on RA, denies SOB  SCDs on. Denies numbness and tingling.   Patient is resting comfortably in bed. Reviewed plan of care. Call light and personal belongings in reach. Bed locked and in lowest position. Hourly rounding in place. All needs met at this time.

## 2020-08-26 NOTE — FACE TO FACE
Face to Face Supporting Documentation - Home Health    The encounter with this patient was in whole or in part the primary reason for home health admission.    Date of encounter:   Patient:                    MRN:                       YOB: 2018  Francesco Schulte  [id number]  1950     Home health to see patient for:  Skilled Nursing care for assessment, interventions & education, Wound Care, Physical Therapy evaluation and treatment and Occupational therapy evaluation and treatment    Skilled need for:  Surgical Aftercare kira's gangrene and skin graft    Skilled nursing interventions to include:  Wound Care    Homebound status evidenced by:  Need the aid of supportive devices such as crutches, canes, wheelchairs or walkers or Needs the assistance of another person in order to leave the home. Leaving home requires a considerable and taxing effort. There is a normal inability to leave the home.    Community Physician to provide follow up care: Malissa Thomson M.D.     Optional Interventions? No      I certify the face to face encounter for this home health care referral meets the CMS requirements and the encounter/clinical assessment with the patient was, in whole, or in part, for the medical condition(s) listed above, which is the primary reason for home health care. Based on my clinical findings: the service(s) are medically necessary, support the need for home health care, and the homebound criteria are met.  I certify that this patient has had a face to face encounter by myself.  Anitra Clemente M.D. - NPI: [id number]

## 2020-08-26 NOTE — PROGRESS NOTES
"Hospital Medicine Daily Progress Note    Date of Service  8/26/2020    Chief Complaint  70 y.o. male admitted 8/22/2020 with scrotal pain    Hospital Course    *70-year-old male with past medical history of polycythemia, lupus, CAD, TIA, chronic systolic heart failure, PAF presented to the hospital on July 25, 2020 with a scrotal pain and swelling due to right scrotal abscess and Landon's gangrene.  Urology was emergently consulted and he underwent washout on July 25 with 3 more subsequent washout on July 29, July 31 and August 3 and wound VAC was placed.  Culture showed Abigail albicans and infectious disease consulted and he completed the course of Unasyn and fluconazole on August 17, 2020.  Patient transferred to [facility] from [facility] for plastic surgery consult.*      Interval Problem Update  No overnight events. Frustrated because he would like to ambulate more. Also concerned that he is having a flare of lupus; reports that naproxen helps and would like it restarted.     Consultants/Specialty  Urology  Infectious disease  Plastic surgery    Code Status  Full Code    Disposition  PT recommended SNF. Patient declines, he would like to progress here and then d/c home.     Review of Systems  Review of Systems   Constitutional: Positive for malaise/fatigue (improving). Negative for chills and fever.   HENT: Negative for hearing loss and tinnitus.    Eyes: Negative for blurred vision, double vision, photophobia and pain.   Respiratory: Negative for cough, sputum production and shortness of breath.    Cardiovascular: Negative for chest pain, palpitations and leg swelling.   Gastrointestinal: Negative for abdominal pain, nausea and vomiting.   Genitourinary: Negative for dysuria, frequency and urgency.        Scrotal pain, improving   Musculoskeletal: Positive for myalgias (at skin graft site when mobilizing). Negative for joint pain and neck pain.   Skin: Positive for rash (\"road rash\"). "   Neurological: Negative for dizziness, speech change, focal weakness and headaches.   Psychiatric/Behavioral: Negative for hallucinations and substance abuse.   All other systems reviewed and are negative.       Physical Exam  Temp:  [36.7 °C (98 °F)-37.1 °C (98.8 °F)] 36.9 °C (98.4 °F)  Pulse:  [71-84] 84  Resp:  [15-18] 17  BP: (102-124)/(62-89) 124/89  SpO2:  [92 %-99 %] 94 %    Physical Exam  Vitals signs reviewed.   Constitutional:       General: He is not in acute distress.     Appearance: He is not diaphoretic.      Interventions: Nasal cannula in place.   HENT:      Head: Normocephalic. No contusion.      Nose: Nose normal.      Mouth/Throat:      Mouth: Mucous membranes are moist.      Pharynx: No oropharyngeal exudate.   Eyes:      General: No scleral icterus.        Right eye: No discharge.         Left eye: No discharge.      Extraocular Movements: Extraocular movements intact.   Neck:      Musculoskeletal: No neck rigidity or muscular tenderness.   Cardiovascular:      Rate and Rhythm: Normal rate and regular rhythm.   Pulmonary:      Effort: Pulmonary effort is normal.      Breath sounds: No wheezing or rhonchi.   Abdominal:      General: Bowel sounds are normal. There is no distension.      Palpations: Abdomen is soft.      Tenderness: There is no abdominal tenderness. There is no guarding.   Genitourinary:     Comments: Tenderness on scrotal area.  Wound VAC is present on his scrotal area  Musculoskeletal:         General: No tenderness.      Right lower leg: No edema.      Left lower leg: No edema.   Skin:     General: Skin is dry.      Coloration: Skin is not jaundiced.      Comments: Right thigh with dressing in place, dried drainage; removed dressing, healing well with good granulated tissue   Neurological:      General: No focal deficit present.      Mental Status: He is alert.      Cranial Nerves: No cranial nerve deficit.      Sensory: No sensory deficit.   Psychiatric:         Mood and  Affect: Mood normal.         Behavior: Behavior is cooperative.     Patient seen and evaluated today on 8/26, unchanged from 8/25.     Fluids    Intake/Output Summary (Last 24 hours) at 8/26/2020 0722  Last data filed at 8/25/2020 2000  Gross per 24 hour   Intake 770 ml   Output 2025 ml   Net -1255 ml       Laboratory  Recent Labs     08/24/20  0732 08/25/20  0658 08/26/20  0526   WBC 5.1 6.1 5.4   RBC 4.78 4.58* 4.39*   HEMOGLOBIN 12.1* 11.4* 11.2*   HEMATOCRIT 39.9* 37.6* 36.1*   MCV 83.5 82.1 82.2   MCH 25.3* 24.9* 25.5*   MCHC 30.3* 30.3* 31.0*   RDW 55.2* 54.4* 55.4*   PLATELETCT 619* 512* 434   MPV 10.6 11.5 10.7     Recent Labs     08/24/20  0732 08/25/20  0658 08/26/20  0526   SODIUM 137 136 140   POTASSIUM 3.7 3.5* 3.9   CHLORIDE 100 101 104   CO2 25 23 25   GLUCOSE 152* 140* 130*   BUN 11 11 14   CREATININE 0.65 0.59 0.63   CALCIUM 9.0 8.9 9.1                   Imaging  No orders to display        Assessment/Plan  * 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 ID evaluated on last hospitalization  - s/p 2 week course of abx s/p multiple washouts  - s/p graft placement on 8/23  - wound care  - pain control  - gabapentin increased  - declining SNF; will place referral for HH    Thrombocytosis ([facility])  Assessment & Plan  Platelet count is trending down  Continue hydroxyurea.    Stenosis of right carotid artery-Right CEA 3/21/18- (present on admission)  Assessment & Plan  Continue aspirin    Hypokalemia- (present on admission)  Assessment & Plan  Mild at 3.5. In the setting of hypomag.  - monitor and replete K and Mg    Type 2 diabetes mellitus, with long-term current use of insulin ([facility])- (present on admission)  Assessment & Plan  With hyperglycemia. A1C uncontrolled at 8.6%. Takes insulin at home.  - resistant sliding scale  - DM diet and hypoglycemia protocol    Chronic systolic heart failure ([facility])- (present on admission)  Assessment & Plan  EF 30%. No  acute exacerbation and he is euvolemic  - cardiology evaluated, appreciate recs  - monitor clinically  - there was mention of possible ICD placement outpatient    Paroxysmal A-fib (HCC)- (present on admission)  Assessment & Plan  Rate controlled. No longer requiring midodrine for BP.  - discussed with plastic surgery and urology, ok to resume anticoagulation  - eliquis    History of TIA (transient ischemic attack)- (present on admission)  Assessment & Plan  - Continue aspirin    SLE (systemic lupus erythematosus related syndrome) (HCC)- (present on admission)  Assessment & Plan  - Holding steroid in setting of infection; last dose was July 31. No signs of adrenal insufficiency.  - Continue azathioprine  - restart naproxen BID PRN    Polycythemia vera (HCC)- (present on admission)  Assessment & Plan  Plt improving from 786 --> 434 today. Improving.   - Continue hydroxyurea  - outpatient follow up with heme/onc      VTE prophylaxis: home eliquis for afib

## 2020-08-26 NOTE — WOUND TEAM
Received call from RN, orders received for donor site for nursing to complete.  Wound team to remove VAC dressing Saturday POD 4.

## 2020-08-27 PROBLEM — R33.9 URINARY RETENTION: Status: ACTIVE | Noted: 2020-08-27

## 2020-08-27 LAB
BASOPHILS # BLD AUTO: 1.9 % (ref 0–1.8)
BASOPHILS # BLD: 0.1 K/UL (ref 0–0.12)
EOSINOPHIL # BLD AUTO: 0.1 K/UL (ref 0–0.51)
EOSINOPHIL NFR BLD: 1.9 % (ref 0–6.9)
ERYTHROCYTE [DISTWIDTH] IN BLOOD BY AUTOMATED COUNT: 58.3 FL (ref 35.9–50)
GLUCOSE BLD-MCNC: 156 MG/DL (ref 65–99)
GLUCOSE BLD-MCNC: 159 MG/DL (ref 65–99)
GLUCOSE BLD-MCNC: 161 MG/DL (ref 65–99)
GLUCOSE BLD-MCNC: 94 MG/DL (ref 65–99)
HCT VFR BLD AUTO: 37.1 % (ref 42–52)
HGB BLD-MCNC: 11.3 G/DL (ref 14–18)
IMM GRANULOCYTES # BLD AUTO: 0.04 K/UL (ref 0–0.11)
IMM GRANULOCYTES NFR BLD AUTO: 0.8 % (ref 0–0.9)
LYMPHOCYTES # BLD AUTO: 0.46 K/UL (ref 1–4.8)
LYMPHOCYTES NFR BLD: 8.8 % (ref 22–41)
MCH RBC QN AUTO: 25.4 PG (ref 27–33)
MCHC RBC AUTO-ENTMCNC: 30.5 G/DL (ref 33.7–35.3)
MCV RBC AUTO: 83.4 FL (ref 81.4–97.8)
MONOCYTES # BLD AUTO: 0.36 K/UL (ref 0–0.85)
MONOCYTES NFR BLD AUTO: 6.9 % (ref 0–13.4)
NEUTROPHILS # BLD AUTO: 4.15 K/UL (ref 1.82–7.42)
NEUTROPHILS NFR BLD: 79.7 % (ref 44–72)
NRBC # BLD AUTO: 0 K/UL
NRBC BLD-RTO: 0 /100 WBC
PLATELET # BLD AUTO: 397 K/UL (ref 164–446)
PMV BLD AUTO: 11.5 FL (ref 9–12.9)
RBC # BLD AUTO: 4.45 M/UL (ref 4.7–6.1)
WBC # BLD AUTO: 5.2 K/UL (ref 4.8–10.8)

## 2020-08-27 PROCEDURE — A9270 NON-COVERED ITEM OR SERVICE: HCPCS | Performed by: HOSPITALIST

## 2020-08-27 PROCEDURE — A9270 NON-COVERED ITEM OR SERVICE: HCPCS | Performed by: INTERNAL MEDICINE

## 2020-08-27 PROCEDURE — 770001 HCHG ROOM/CARE - MED/SURG/GYN PRIV*

## 2020-08-27 PROCEDURE — 700102 HCHG RX REV CODE 250 W/ 637 OVERRIDE(OP): Performed by: INTERNAL MEDICINE

## 2020-08-27 PROCEDURE — 700101 HCHG RX REV CODE 250: Performed by: NURSE PRACTITIONER

## 2020-08-27 PROCEDURE — 99232 SBSQ HOSP IP/OBS MODERATE 35: CPT | Performed by: HOSPITALIST

## 2020-08-27 PROCEDURE — 700102 HCHG RX REV CODE 250 W/ 637 OVERRIDE(OP): Performed by: HOSPITALIST

## 2020-08-27 PROCEDURE — A9270 NON-COVERED ITEM OR SERVICE: HCPCS | Performed by: NURSE PRACTITIONER

## 2020-08-27 PROCEDURE — 700102 HCHG RX REV CODE 250 W/ 637 OVERRIDE(OP): Performed by: NURSE PRACTITIONER

## 2020-08-27 PROCEDURE — 82962 GLUCOSE BLOOD TEST: CPT | Mod: 91

## 2020-08-27 PROCEDURE — 36415 COLL VENOUS BLD VENIPUNCTURE: CPT

## 2020-08-27 PROCEDURE — 700111 HCHG RX REV CODE 636 W/ 250 OVERRIDE (IP): Performed by: NURSE PRACTITIONER

## 2020-08-27 PROCEDURE — 85025 COMPLETE CBC W/AUTO DIFF WBC: CPT

## 2020-08-27 RX ORDER — HYDROXYUREA 500 MG/1
1000 CAPSULE ORAL NIGHTLY
Status: DISCONTINUED | OUTPATIENT
Start: 2020-08-27 | End: 2020-08-28

## 2020-08-27 RX ORDER — DEXTROSE MONOHYDRATE 25 G/50ML
50 INJECTION, SOLUTION INTRAVENOUS
Status: DISCONTINUED | OUTPATIENT
Start: 2020-08-27 | End: 2020-08-31 | Stop reason: HOSPADM

## 2020-08-27 RX ADMIN — OMEPRAZOLE 20 MG: 20 CAPSULE, DELAYED RELEASE ORAL at 08:05

## 2020-08-27 RX ADMIN — HYDROXYUREA 1000 MG: 500 CAPSULE ORAL at 20:39

## 2020-08-27 RX ADMIN — MORPHINE SULFATE 15 MG: 15 TABLET, FILM COATED, EXTENDED RELEASE ORAL at 20:41

## 2020-08-27 RX ADMIN — NAPROXEN 750 MG: 250 TABLET ORAL at 20:39

## 2020-08-27 RX ADMIN — GABAPENTIN 600 MG: 300 CAPSULE ORAL at 20:39

## 2020-08-27 RX ADMIN — GABAPENTIN 600 MG: 300 CAPSULE ORAL at 08:05

## 2020-08-27 RX ADMIN — THERA TABS 1 TABLET: TAB at 08:05

## 2020-08-27 RX ADMIN — LIDOCAINE HYDROCHLORIDE 20 ML: 20 INJECTION, SOLUTION INFILTRATION; PERINEURAL at 14:21

## 2020-08-27 RX ADMIN — ASPIRIN 81 MG: 81 TABLET, COATED ORAL at 08:05

## 2020-08-27 RX ADMIN — AZATHIOPRINE 50 MG: 50 TABLET ORAL at 08:05

## 2020-08-27 RX ADMIN — GABAPENTIN 600 MG: 300 CAPSULE ORAL at 16:16

## 2020-08-27 RX ADMIN — ANTACID TABLETS 1000 MG: 500 TABLET, CHEWABLE ORAL at 20:49

## 2020-08-27 RX ADMIN — APIXABAN 5 MG: 5 TABLET, FILM COATED ORAL at 08:05

## 2020-08-27 RX ADMIN — INSULIN HUMAN 2 UNITS: 100 INJECTION, SOLUTION PARENTERAL at 16:27

## 2020-08-27 RX ADMIN — APIXABAN 5 MG: 5 TABLET, FILM COATED ORAL at 16:23

## 2020-08-27 RX ADMIN — AZATHIOPRINE 50 MG: 50 TABLET ORAL at 20:39

## 2020-08-27 RX ADMIN — MORPHINE SULFATE 5 MG: 10 INJECTION INTRAVENOUS at 14:21

## 2020-08-27 RX ADMIN — MORPHINE SULFATE 15 MG: 15 TABLET, FILM COATED, EXTENDED RELEASE ORAL at 08:05

## 2020-08-27 RX ADMIN — LORAZEPAM 0.5 MG: 2 INJECTION INTRAMUSCULAR; INTRAVENOUS at 14:21

## 2020-08-27 RX ADMIN — INSULIN HUMAN 2 UNITS: 100 INJECTION, SOLUTION PARENTERAL at 20:45

## 2020-08-27 ASSESSMENT — ENCOUNTER SYMPTOMS
DIAPHORESIS: 0
NAUSEA: 0
HEADACHES: 0
SPEECH CHANGE: 0
NECK PAIN: 0
FOCAL WEAKNESS: 0
ABDOMINAL PAIN: 0
PALPITATIONS: 0
COUGH: 0
SHORTNESS OF BREATH: 0
NERVOUS/ANXIOUS: 0
HALLUCINATIONS: 0
VOMITING: 0
EYE DISCHARGE: 0
FEVER: 0
MYALGIAS: 1
EYE PAIN: 0
DIZZINESS: 0
CHILLS: 0
SPUTUM PRODUCTION: 0

## 2020-08-27 NOTE — PROGRESS NOTES
"Hospital Medicine Daily Progress Note    Date of Service  8/27/2020    Chief Complaint  70 y.o. male admitted 8/22/2020 with scrotal pain    Hospital Course    *70-year-old male with past medical history of polycythemia, lupus, CAD, TIA, chronic systolic heart failure, PAF presented to the hospital on July 25, 2020 with a scrotal pain and swelling due to right scrotal abscess and Landon's gangrene.  Urology was emergently consulted and he underwent washout on July 25 with 3 more subsequent washout on July 29, July 31 and August 3 and wound VAC was placed.  Culture showed Abigail albicans and infectious disease consulted and he completed the course of Unasyn and fluconazole on August 17, 2020.  Patient transferred to [facility] from [facility] for plastic surgery consult.*      Interval Problem Update  No overnight events. Reports that the naproxen helped with his \"lupus flare\" and he is better able to move around today. Plan for wound care to evaluate, they will discuss with Plastic Surgery.     Consultants/Specialty  Urology  Infectious disease  Plastic surgery    Code Status  Full Code    Disposition  PT recommended SNF. Patient declines, he would like to progress here and then d/c home with HH. Anticipate 2-3 days.     Review of Systems  Review of Systems   Constitutional: Positive for malaise/fatigue (improved today). Negative for chills, diaphoresis and fever.   HENT: Negative for hearing loss.    Eyes: Negative for pain and discharge.   Respiratory: Negative for cough, sputum production and shortness of breath.    Cardiovascular: Negative for chest pain, palpitations and leg swelling.   Gastrointestinal: Negative for abdominal pain, nausea and vomiting.   Genitourinary: Negative for dysuria, frequency and urgency.        Scrotal pain, improving   Musculoskeletal: Positive for myalgias (at skin graft site when mobilizing). Negative for joint pain and neck pain.   Skin: Positive for rash (\"road rash\" " right thigh).   Neurological: Negative for dizziness, speech change, focal weakness and headaches.   Psychiatric/Behavioral: Negative for hallucinations. The patient is not nervous/anxious.    All other systems reviewed and are negative.       Physical Exam  Temp:  [36.2 °C (97.1 °F)-36.8 °C (98.3 °F)] 36.8 °C (98.3 °F)  Pulse:  [60-86] 60  Resp:  [16-18] 18  BP: ()/(52-62) 100/58  SpO2:  [94 %-97 %] 97 %    Physical Exam  Vitals signs reviewed.   Constitutional:       General: He is not in acute distress.     Appearance: He is not diaphoretic.      Interventions: Nasal cannula in place.   HENT:      Head: Normocephalic. No contusion.      Nose: Nose normal.      Mouth/Throat:      Mouth: Mucous membranes are moist.      Pharynx: No oropharyngeal exudate.   Eyes:      General:         Right eye: No discharge.         Left eye: No discharge.      Extraocular Movements: Extraocular movements intact.   Neck:      Musculoskeletal: Normal range of motion. No neck rigidity.   Cardiovascular:      Rate and Rhythm: Normal rate and regular rhythm.      Pulses: Normal pulses.   Pulmonary:      Effort: Pulmonary effort is normal. No respiratory distress.   Abdominal:      General: There is no distension.      Palpations: Abdomen is soft.      Tenderness: There is no abdominal tenderness. There is no guarding.   Genitourinary:     Comments: Tenderness on scrotal area.  Wound VAC is present on his scrotal area  Musculoskeletal:         General: No tenderness.      Right lower leg: No edema.      Left lower leg: No edema.   Skin:     General: Skin is dry.      Coloration: Skin is not jaundiced.      Comments: Right thigh with dressing in place, dried drainage   Neurological:      General: No focal deficit present.      Mental Status: He is alert.      Cranial Nerves: No cranial nerve deficit.      Sensory: No sensory deficit.   Psychiatric:         Mood and Affect: Mood normal.         Behavior: Behavior is cooperative.          Fluids    Intake/Output Summary (Last 24 hours) at 8/27/2020 0735  Last data filed at 8/27/2020 0600  Gross per 24 hour   Intake 600 ml   Output 2275 ml   Net -1675 ml       Laboratory  Recent Labs     08/25/20  0658 08/26/20  0526 08/27/20  0707   WBC 6.1 5.4 5.2   RBC 4.58* 4.39* 4.45*   HEMOGLOBIN 11.4* 11.2* 11.3*   HEMATOCRIT 37.6* 36.1* 37.1*   MCV 82.1 82.2 83.4   MCH 24.9* 25.5* 25.4*   MCHC 30.3* 31.0* 30.5*   RDW 54.4* 55.4* 58.3*   PLATELETCT 512* 434 397   MPV 11.5 10.7 11.5     Recent Labs     08/25/20  0658 08/26/20  0526   SODIUM 136 140   POTASSIUM 3.5* 3.9   CHLORIDE 101 104   CO2 23 25   GLUCOSE 140* 130*   BUN 11 14   CREATININE 0.59 0.63   CALCIUM 8.9 9.1                   Imaging  No orders to display        Assessment/Plan  * 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 ID evaluated on last hospitalization  - s/p 2 week course of abx s/p multiple washouts  - s/p graft placement on 8/23  - wound vac in place; plan for possible removal today  - pain control  - gabapentin increased, tolerating well  - declining SNF; referral for  sent    Hypokalemia- (present on admission)  Assessment & Plan  Improving. Mild at 3.5. In the setting of hypomag.  - monitor and replete K and Mg    Type 2 diabetes mellitus, with long-term current use of insulin ([facility])- (present on admission)  Assessment & Plan  With hyperglycemia. A1C uncontrolled at 8.6%. Takes insulin at home.  - reduce to moderate sliding scale  - DM diet and hypoglycemia protocol    Chronic systolic heart failure (HCC)- (present on admission)  Assessment & Plan  EF 30%. No acute exacerbation and he is euvolemic.  - cardiology evaluated, appreciate recs  - monitor clinically  - there was mention of possible ICD placement outpatient    Paroxysmal A-fib ([facility])- (present on admission)  Assessment & Plan  Rate controlled. No longer requiring midodrine for BP.  - discussed with plastic  surgery and urology, ok to resume anticoagulation  - eliquis    History of TIA (transient ischemic attack)- (present on admission)  Assessment & Plan  - Continue aspirin    SLE (systemic lupus erythematosus related syndrome) (HCC)- (present on admission)  Assessment & Plan  - Holding steroid in setting of acute infection; last dose was July 31. No signs of adrenal insufficiency.  - Continue azathioprine  - restart naproxen BID PRN    Polycythemia vera (HCC)- (present on admission)  Assessment & Plan  Plt improving from 786 --> 434 --> 397 today. Improving.   - Continue hydroxyurea, dose lowered today  - outpatient follow up with heme/onc    Stenosis of right carotid artery-Right CEA 3/21/18- (present on admission)  Assessment & Plan  - Continue aspirin      VTE prophylaxis: home eliquis for afib

## 2020-08-27 NOTE — DISCHARGE PLANNING
ATTN: Case Management  RE: Referral for Home Health    Reason for referral denial: We will be unable to see patient in a safe and timely manner              Unfortunately, we are not able to accept this referral for the reason listed above. If further clarity is needed, our Transitional Care Specialists are available to discuss any barriers to service at [phone].      We look forward to collaborating with you in the future,  [facility] Team

## 2020-08-27 NOTE — PROGRESS NOTES
I came by to see patient today.  He was not in his room.  I have been reviewing his labs daily.  His platelets today are down to 397. With a marked decrease over the last 3 to 4 days, I will decrease the Hydrea from 1500 mg/day down to 1000 mg/day for now.    As an outpatient he was on 1000 mg/day alternating with 500 mg/day.  We will continue to monitor his counts and help in the case.    In terms of his scrotal infection/gangrene, multiple surgeries, and eventual graft placement on 8/23/2020, it sounds like he is steadily improving.  He finished a full course of antibiotics including Unasyn and fluconazole on 8/17/2020.    He also continues on aspirin and Eliquis with his history of stroke in atrial fibrillation.    --Dr. Jigar Blevins

## 2020-08-27 NOTE — DISCHARGE PLANNING
Received Choice form at [phone]  Agency/Facility Name: [facility]  Referral sent per Choice form @ [phone]

## 2020-08-27 NOTE — WOUND TEAM
[facility]  Inpatient Services  Initial Wound and Skin Care Evaluation    Admission Date: 8/22/2020     Consult Date: 08/27/2020 at 1430 pm  HPI, PMH, SH: Reviewed    Unit where seen by Wound Team: [id number]     WOUND CONSULT RELATED TO: Scheduled npwt dressing removal after skin graft and vac applied.  POD#4 today.    Self Report / Pain Level: Pt reports pain at site.  Premedicated with IV medication, lidocaine instilled into vac sponge and soaked 10 mins prior to removal.    OBJECTIVE:  Pt lying in bed; agreeable and friendly.    WOUND TYPE, LOCATION, CHARACTERISTICS (Pressure Injuries: location, stage, POA or date identified)     Wound 08/23/20 Incision Scrotum Right scrotum (Active)   Wound Image   08/27/20 1430   Site Assessment Red;Pink;Skin graft 08/27/20 1430   Periwound Assessment Induration;Edema 08/27/20 1430   Margins Attached edges 08/27/20 1430   Closure Secondary intention and skin graft 08/27/20 1430   Drainage Amount Moderate 08/27/20 1430   Drainage Description Serosanguineous 08/27/20 1430   Treatments Periwound cleansed 08/27/20 1430   Wound Cleansing Approved Wound Cleanser 08/27/20 1430   Periwound Protectant Not Applicable 08/27/20 1430   Dressing Options Petroleum Gauze (clear);Dry Gauze;Secured in place with a few small pieces hypafix and mesh underwear with holes cut to allow for elimination without removal of garment. 08/27/20 1430   Dressing Changed New 08/27/20 1430   Dressing Status Clean;Dry;Intact 08/27/20 0830   Dressing Change/Treatment Frequency Daily, and As Needed 08/27/20 1430   Non-staged Wound Description Full thickness 08/27/20 1430   Tunneling (cm) 0 cm 08/27/20 1430   Undermining (cm) 0 cm 08/27/20 1430   Wound Odor None 08/27/20 1430   Exposed Structures None 08/27/20 1430   WOUND NURSE ONLY - Time Spent with Patient (mins) 60 08/27/20 1430       Wound 08/23/20 Other (comment) Thigh Distal (Active)   Site Assessment Dry xeroform and gauze in place 08/27/20  1430   Periwound Assessment KAL 08/27/20 0830   Margins KAL 08/25/20 0800   Closure KAL 08/25/20 0800   Drainage Amount None 08/25/20 0800   Dressing Options Dry Gauze;Petrolatum Gauze (yellow) 08/27/20 0830   Dressing Changed Loose edges trimmed 08/27/20 1430   Dressing Status Dry;Intact;Old drainage 08/27/20 0830   Wound Odor None 08/27/20 1430   WOUND NURSE ONLY - Time Spent with Patient (mins) 60 08/27/20 1430       Lab Values:    Lab Results   Component Value Date/Time    WBC 5.2 08/27/2020 07:07 AM    RBC 4.45 (L) 08/27/2020 07:07 AM    HEMOGLOBIN 11.3 (L) 08/27/2020 07:07 AM    HEMATOCRIT 37.1 (L) 08/27/2020 07:07 AM                    Lab Results   Component Value Date/Time    HBA1C 8.6 (A) 01/07/2020 07:14 AM           INTERVENTIONS BY WOUND TEAM:  Pt premedicated by bedside JOSLYN Krishnamurthy.  Lidocaine liquid instilled into vac sponge via tubing and allowed to dwell 10 minutes before removal of dressing.  Dr. MICHELLE Craig on phone via zoom meeting to direct care.  Per her order, vac discontinued.  Pt tolerated removal well.  Skin graft intact to 75 or 80% of woundbed.  Remainder of wound bed pink/red/yellow.  Adaptic and dsd applied to wound bed, being sure to tuck both into folds.  Secured with small amt of tape and mesh underwear.  Pt elated to have vac d/c'd.    Interdisciplinary consultation: Patient, Bedside JOSLYN Krishnamurthy, Dr. Craig via zoom    EVALUATION: Skin graft in place.  Continue daily dressing changes.  Pt home with HH in a day or two.    Goals: Steady decrease in wound area and depth weekly.    NURSING PLAN OF CARE ORDERS (X):  Dressing changes: See Dressing Care orders: X  Skin care: See Skin Care orders:   Rectal tube care: See Rectal Tube Care orders:        Other orders:                           RSKIN:   CURRENTLY IN PLACE (X), APPLIED THIS VISIT (A), ORDERED (O):   Q shift Neal:  X  Q shift pressure point assessments:  X  Pressure redistribution mattress                             Low Airloss                         Bariatric MASSIMO                     Bariatric foam                        Heel float boots                     Heel Silicone dressing                         Float Heels off Bed with Pillows               Barrier wipes         Barrier Cream         Barrier paste          Sacral silicone dressing         Silicone O2 tubing         Anchorfast         Cannula fixation Device (Tender )          Gray Foam Ear protectors           Trach with Optifoam split foam                 Waffle cushion        Waffle Overlay         Rectal tube or BMS    Purwick/Condom Cath          Antifungal tx      Interdry          Reposition q 2 hours        Up to chair        Ambulate      PT/OT        Dietician        Diabetes Education      PO     TF     TPN     NPO   # days   Other        WOUND TEAM PLAN OF CARE (X):   Dressing changes by wound team:          Follow up 1-2 times weekly:               Follow up 3 times weekly:                NPWT change 3 times weekly:     Follow up as needed:   X  Other (explain): Nursing to change dressing daily    Anticipated discharge plans (X):   LTACH:        SNF/Rehab:                  Home Care:  X       Outpatient Wound Center:            Self Care:            Other:

## 2020-08-27 NOTE — PROGRESS NOTES
Received report from day shift RN.   Assessment complete. VSS  A+Ox4, patient calls appropriately.   Patient declines pain  Patient ambulates with one person assist  Denies N/V, tolerating a diabetic diet.   + flatus  + void  + BM  Patient on RA, denies SOB  SCDs on. Denies numbness and tingling.   Patient is resting comfortably in bed. Reviewed plan of care. Call light and personal belongings in reach. Bed locked and in lowest position. Hourly rounding in place. All needs met at this time.

## 2020-08-27 NOTE — CARE PLAN
Problem: Communication  Goal: The ability to communicate needs accurately and effectively will improve  Outcome: PROGRESSING AS EXPECTED     Problem: Safety  Goal: Will remain free from injury  Outcome: PROGRESSING AS EXPECTED  Goal: Will remain free from falls  Outcome: PROGRESSING AS EXPECTED     Problem: Bowel/Gastric:  Goal: Normal bowel function is maintained or improved  Outcome: PROGRESSING AS EXPECTED     Problem: Knowledge Deficit  Goal: Knowledge of disease process/condition, treatment plan, diagnostic tests, and medications will improve  Outcome: PROGRESSING AS EXPECTED     Problem: Discharge Barriers/Planning  Goal: Patient's continuum of care needs will be met  Outcome: PROGRESSING AS EXPECTED     Problem: Pain Management  Goal: Pain level will decrease to patient's comfort goal  Outcome: PROGRESSING AS EXPECTED     Problem: Skin Integrity  Goal: Risk for impaired skin integrity will decrease  Outcome: PROGRESSING AS EXPECTED     Problem: Respiratory:  Goal: Respiratory status will improve  Outcome: PROGRESSING AS EXPECTED     Problem: Psychosocial Needs:  Goal: Level of anxiety will decrease  Outcome: PROGRESSING AS EXPECTED     Problem: Mobility  Goal: Risk for activity intolerance will decrease  Outcome: PROGRESSING AS EXPECTED

## 2020-08-27 NOTE — DISCHARGE PLANNING
Received Choice form at [phone]  Agency/Facility Name: [facility]   Referral sent per Choice form @ [phone]

## 2020-08-27 NOTE — PROGRESS NOTES
"Patient A&Ox4, VSS on 1LNC.  Ambulated in halls with SBA.  Up to chair for meals.  Reports feeling that \"lupus is flairing up\" in his shoulders.  Order received for naproxyn.  Wound vac to scrotum intact.  R thigh graft donor site with gauze.  Denies nausea.  Pain well controlled with current regimen.  Call light within reach, hourly rounding in place.    "

## 2020-08-27 NOTE — DISCHARGE PLANNING
RN CM obtained verbal consent for [facility] for FWW from the pt at bedside. Faxed choice to [facility].

## 2020-08-27 NOTE — DISCHARGE PLANNING
Anticipated Discharge Disposition: Home with [facility]    Action:   · Completed chart review  · Obtained choice from the pt at bedside, for [facility]. 1. [facility], 2. [facility]. 3. [facility]. Faxed choice to [facility]  · Assessment completed    · Discussed pt's POC with LOLA Krishnamurthy RN. Yessy states she will order a FWW.     Barriers to Discharge: wound vac, medical clearance    Plan: Continue to collaborate with the pt, pt's family, and health care team to provide social and discharge support as needed.      Care Transition Team Assessment    Assessment     RN CM spoke with the patient at bedside to obtain the information used in this assessment. Patient verified the accuracy of the demographic face sheet.     Pt states he lives in a single level home with his spouse, Alysa. Alysa is not currently working outside the home and is available to support the pt 24/7. Pt does not have any DME at home. Pt states his home is equipped with supportive bars in bathroom and shower seat. Pt was completely independent with all ADLS/IADLSs prior to this hospitalization.     Pt denies history of mental health diagnosis.     Pt uses Dr. Miguel Thomson for PCP and would prefer his medication filled with [facility] pharmacy and delivered to bedside.     Pt completed AD packet. RN CM emailed notary, obtained competency from Dr. Clemente, printed DL, and placed packet in notary basket.     Information Source  Orientation : Oriented x 4  Information Given By: Patient  Informant's Name: Bob Schulte  Who is responsible for making decisions for patient? : Patient         Elopement Risk  Legal Hold: No  Ambulatory or Self Mobile in Wheelchair: Yes  Disoriented: No  Psychiatric Symptoms: None  History of Wandering: No  Elopement this Admit: No  Vocalizing Wanting to Leave: No  Displays Behaviors, Body Language Wanting to Leave: No-Not at Risk for Elopement  Elopement Risk: Not at Risk for Elopement    Interdisciplinary Discharge Planning  Lives with - Patient's  Self Care Capacity: Spouse, Adult Children  Patient or legal guardian wants to designate a caregiver (see row info): No  Housing / Facility: 2 Story House  Prior Services: None    Discharge Preparedness  What is your plan after discharge?: Home with help, Home health care  What are your discharge supports?: Child, Spouse  Prior Functional Level: Ambulatory, Drives Self, Independent with Activities of Daily Living, Independent with Medication Management  Difficulity with ADLs: None  Difficulity with IADLs: None    Functional Assesment  Prior Functional Level: Ambulatory, Drives Self, Independent with Activities of Daily Living, Independent with Medication Management    Finances  Financial Barriers to Discharge: No  Prescription Coverage: Yes    Vision / Hearing Impairment  Vision Impairment : Yes(reading glasses)  Right Eye Vision: Impaired, Wears Glasses  Left Eye Vision: Impaired, Wears Glasses  Hearing Impairment : No         Advance Directive  Advance Directive?: None  Advance Directive offered?: AD Booklet given    Domestic Abuse  Have you ever been the victim of abuse or violence?: No  Physical Abuse or Sexual Abuse: No  Verbal Abuse or Emotional Abuse: No  Possible Abuse Reported to:: Not Applicable    Psychological Assessment  History of Substance Abuse: None  History of Psychiatric Problems: No  Non-compliant with Treatment: No  Newly Diagnosed Illness: Yes    Discharge Risks or Barriers  Discharge risks or barriers?: Complex medical needs, Other (comment)  Patient risk factors: Complex medical needs    Anticipated Discharge Information  Discharge Address: [address]  Discharge Contact Phone Number: [phone]

## 2020-08-28 LAB
ANION GAP SERPL CALC-SCNC: 12 MMOL/L (ref 7–16)
BASOPHILS # BLD AUTO: 2 % (ref 0–1.8)
BASOPHILS # BLD: 0.07 K/UL (ref 0–0.12)
BUN SERPL-MCNC: 17 MG/DL (ref 8–22)
CALCIUM SERPL-MCNC: 9.2 MG/DL (ref 8.5–10.5)
CHLORIDE SERPL-SCNC: 102 MMOL/L (ref 96–112)
CO2 SERPL-SCNC: 24 MMOL/L (ref 20–33)
CREAT SERPL-MCNC: 0.7 MG/DL (ref 0.5–1.4)
EOSINOPHIL # BLD AUTO: 0.14 K/UL (ref 0–0.51)
EOSINOPHIL NFR BLD: 4.1 % (ref 0–6.9)
ERYTHROCYTE [DISTWIDTH] IN BLOOD BY AUTOMATED COUNT: 57.7 FL (ref 35.9–50)
GLUCOSE BLD-MCNC: 112 MG/DL (ref 65–99)
GLUCOSE BLD-MCNC: 152 MG/DL (ref 65–99)
GLUCOSE BLD-MCNC: 154 MG/DL (ref 65–99)
GLUCOSE BLD-MCNC: 170 MG/DL (ref 65–99)
GLUCOSE SERPL-MCNC: 120 MG/DL (ref 65–99)
HCT VFR BLD AUTO: 37.3 % (ref 42–52)
HGB BLD-MCNC: 11.3 G/DL (ref 14–18)
IMM GRANULOCYTES # BLD AUTO: 0.02 K/UL (ref 0–0.11)
IMM GRANULOCYTES NFR BLD AUTO: 0.6 % (ref 0–0.9)
LYMPHOCYTES # BLD AUTO: 0.47 K/UL (ref 1–4.8)
LYMPHOCYTES NFR BLD: 13.7 % (ref 22–41)
MAGNESIUM SERPL-MCNC: 1.8 MG/DL (ref 1.5–2.5)
MCH RBC QN AUTO: 25.2 PG (ref 27–33)
MCHC RBC AUTO-ENTMCNC: 30.3 G/DL (ref 33.7–35.3)
MCV RBC AUTO: 83.3 FL (ref 81.4–97.8)
MONOCYTES # BLD AUTO: 0.25 K/UL (ref 0–0.85)
MONOCYTES NFR BLD AUTO: 7.3 % (ref 0–13.4)
NEUTROPHILS # BLD AUTO: 2.48 K/UL (ref 1.82–7.42)
NEUTROPHILS NFR BLD: 72.3 % (ref 44–72)
NRBC # BLD AUTO: 0 K/UL
NRBC BLD-RTO: 0 /100 WBC
PLATELET # BLD AUTO: 366 K/UL (ref 164–446)
PMV BLD AUTO: 12.2 FL (ref 9–12.9)
POTASSIUM SERPL-SCNC: 3.7 MMOL/L (ref 3.6–5.5)
RBC # BLD AUTO: 4.48 M/UL (ref 4.7–6.1)
SODIUM SERPL-SCNC: 138 MMOL/L (ref 135–145)
WBC # BLD AUTO: 3.4 K/UL (ref 4.8–10.8)

## 2020-08-28 PROCEDURE — 36415 COLL VENOUS BLD VENIPUNCTURE: CPT

## 2020-08-28 PROCEDURE — 700102 HCHG RX REV CODE 250 W/ 637 OVERRIDE(OP): Performed by: NURSE PRACTITIONER

## 2020-08-28 PROCEDURE — 99232 SBSQ HOSP IP/OBS MODERATE 35: CPT | Performed by: HOSPITALIST

## 2020-08-28 PROCEDURE — 97116 GAIT TRAINING THERAPY: CPT | Mod: CQ

## 2020-08-28 PROCEDURE — 700102 HCHG RX REV CODE 250 W/ 637 OVERRIDE(OP): Performed by: INTERNAL MEDICINE

## 2020-08-28 PROCEDURE — 97530 THERAPEUTIC ACTIVITIES: CPT | Mod: CQ

## 2020-08-28 PROCEDURE — 85025 COMPLETE CBC W/AUTO DIFF WBC: CPT

## 2020-08-28 PROCEDURE — 83735 ASSAY OF MAGNESIUM: CPT

## 2020-08-28 PROCEDURE — 700102 HCHG RX REV CODE 250 W/ 637 OVERRIDE(OP): Performed by: HOSPITALIST

## 2020-08-28 PROCEDURE — 80048 BASIC METABOLIC PNL TOTAL CA: CPT

## 2020-08-28 PROCEDURE — A9270 NON-COVERED ITEM OR SERVICE: HCPCS | Performed by: INTERNAL MEDICINE

## 2020-08-28 PROCEDURE — 700111 HCHG RX REV CODE 636 W/ 250 OVERRIDE (IP): Performed by: NURSE PRACTITIONER

## 2020-08-28 PROCEDURE — A9270 NON-COVERED ITEM OR SERVICE: HCPCS | Performed by: NURSE PRACTITIONER

## 2020-08-28 PROCEDURE — 97110 THERAPEUTIC EXERCISES: CPT

## 2020-08-28 PROCEDURE — 82962 GLUCOSE BLOOD TEST: CPT | Mod: 91

## 2020-08-28 PROCEDURE — 770001 HCHG ROOM/CARE - MED/SURG/GYN PRIV*

## 2020-08-28 PROCEDURE — A9270 NON-COVERED ITEM OR SERVICE: HCPCS | Performed by: HOSPITALIST

## 2020-08-28 RX ORDER — HYDROXYUREA 500 MG/1
500 CAPSULE ORAL NIGHTLY
Status: DISCONTINUED | OUTPATIENT
Start: 2020-08-28 | End: 2020-08-30

## 2020-08-28 RX ADMIN — GABAPENTIN 600 MG: 300 CAPSULE ORAL at 14:31

## 2020-08-28 RX ADMIN — ANTACID TABLETS 1000 MG: 500 TABLET, CHEWABLE ORAL at 16:58

## 2020-08-28 RX ADMIN — INSULIN HUMAN 2 UNITS: 100 INJECTION, SOLUTION PARENTERAL at 21:18

## 2020-08-28 RX ADMIN — MORPHINE SULFATE 15 MG: 15 TABLET, FILM COATED, EXTENDED RELEASE ORAL at 20:46

## 2020-08-28 RX ADMIN — AZATHIOPRINE 50 MG: 50 TABLET ORAL at 20:46

## 2020-08-28 RX ADMIN — OMEPRAZOLE 20 MG: 20 CAPSULE, DELAYED RELEASE ORAL at 09:28

## 2020-08-28 RX ADMIN — THERA TABS 1 TABLET: TAB at 09:29

## 2020-08-28 RX ADMIN — APIXABAN 5 MG: 5 TABLET, FILM COATED ORAL at 09:29

## 2020-08-28 RX ADMIN — INSULIN HUMAN 2 UNITS: 100 INJECTION, SOLUTION PARENTERAL at 11:32

## 2020-08-28 RX ADMIN — LIDOCAINE HYDROCHLORIDE 15 ML: 20 SOLUTION OROPHARYNGEAL at 20:46

## 2020-08-28 RX ADMIN — ASPIRIN 81 MG: 81 TABLET, COATED ORAL at 09:29

## 2020-08-28 RX ADMIN — GABAPENTIN 600 MG: 300 CAPSULE ORAL at 20:46

## 2020-08-28 RX ADMIN — HYDROXYUREA 500 MG: 500 CAPSULE ORAL at 20:46

## 2020-08-28 RX ADMIN — NAPROXEN 750 MG: 250 TABLET ORAL at 11:34

## 2020-08-28 RX ADMIN — AZATHIOPRINE 50 MG: 50 TABLET ORAL at 09:28

## 2020-08-28 RX ADMIN — APIXABAN 5 MG: 5 TABLET, FILM COATED ORAL at 16:53

## 2020-08-28 RX ADMIN — GABAPENTIN 600 MG: 300 CAPSULE ORAL at 09:28

## 2020-08-28 RX ADMIN — INSULIN HUMAN 2 UNITS: 100 INJECTION, SOLUTION PARENTERAL at 16:49

## 2020-08-28 ASSESSMENT — ENCOUNTER SYMPTOMS
CHILLS: 0
NERVOUS/ANXIOUS: 0
SPEECH CHANGE: 0
DIZZINESS: 0
COUGH: 0
EYE PAIN: 0
DIAPHORESIS: 0
MYALGIAS: 1
DIARRHEA: 0
VOMITING: 0
SPUTUM PRODUCTION: 0
ABDOMINAL PAIN: 0
SINUS PAIN: 0
EYE DISCHARGE: 0
NAUSEA: 0
PALPITATIONS: 0
HALLUCINATIONS: 0
ORTHOPNEA: 0
EYE REDNESS: 0
FOCAL WEAKNESS: 0
NECK PAIN: 0
FEVER: 0
HEARTBURN: 0
SHORTNESS OF BREATH: 0
HEMOPTYSIS: 0
HEADACHES: 0
WEIGHT LOSS: 0

## 2020-08-28 ASSESSMENT — COGNITIVE AND FUNCTIONAL STATUS - GENERAL
MOVING TO AND FROM BED TO CHAIR: A LITTLE
CLIMB 3 TO 5 STEPS WITH RAILING: A LITTLE
SUGGESTED CMS G CODE MODIFIER MOBILITY: CJ
MOBILITY SCORE: 21
WALKING IN HOSPITAL ROOM: A LITTLE

## 2020-08-28 ASSESSMENT — GAIT ASSESSMENTS
DISTANCE (FEET): 500
DEVIATION: OTHER (COMMENT)
GAIT LEVEL OF ASSIST: SUPERVISED
ASSISTIVE DEVICE: FRONT WHEEL WALKER

## 2020-08-28 NOTE — DISCHARGE PLANNING
Anticipated Discharge Disposition: Home with [facility].    Action: This case was discussed today during IDT Rounds. Per MD this patient is expected to discharge home tomorrow 8/29/20 with [facility].    Barriers to Discharge: Medical Clearance.    Plan: Home with [facility], pending medical clearance.

## 2020-08-28 NOTE — PROGRESS NOTES
"Hospital Medicine Daily Progress Note    Date of Service  8/28/2020    Chief Complaint  70 y.o. male admitted 8/22/2020 with scrotal pain    Hospital Course    *70-year-old male with past medical history of polycythemia, lupus, CAD, TIA, chronic systolic heart failure, PAF presented to the hospital on July 25, 2020 with a scrotal pain and swelling due to right scrotal abscess and Landon's gangrene.  Urology was emergently consulted and he underwent washout on July 25 with 3 more subsequent washout on July 29, July 31 and August 3 and wound VAC was placed.  Culture showed Abigail albicans and infectious disease consulted and he completed the course of Unasyn and fluconazole on August 17, 2020.  Patient transferred to [facility] from [facility] for plastic surgery consult.*      Interval Problem Update  Wound vac off since yesterday. Will do voiding trial today. Expressed excitement at going home.     Consultants/Specialty  Urology  Infectious disease  Plastic surgery    Code Status  Full Code    Disposition  PT recommended SNF. Patient declines, he would like to progress here and then d/c home with HH.     Anticipate home tomorrow.     Review of Systems  Review of Systems   Constitutional: Positive for malaise/fatigue (improved today). Negative for chills, diaphoresis and fever.   HENT: Negative for hearing loss.    Eyes: Negative for pain and discharge.   Respiratory: Negative for cough, sputum production and shortness of breath.    Cardiovascular: Negative for chest pain, palpitations and leg swelling.   Gastrointestinal: Negative for abdominal pain, nausea and vomiting.   Genitourinary: Negative for dysuria, frequency and urgency.        Scrotal pain, improving   Musculoskeletal: Positive for myalgias (at skin graft site when mobilizing). Negative for joint pain and neck pain.   Skin: Positive for rash (\"road rash\" right thigh).   Neurological: Negative for dizziness, speech change, focal weakness and " headaches.   Psychiatric/Behavioral: Negative for hallucinations. The patient is not nervous/anxious.    All other systems reviewed and are negative.       Physical Exam  Temp:  [36.2 °C (97.2 °F)-37.6 °C (99.7 °F)] 36.2 °C (97.2 °F)  Pulse:  [60-68] 62  Resp:  [18] 18  BP: (105-126)/(58-83) 125/71  SpO2:  [93 %-98 %] 95 %    Physical Exam  Vitals signs reviewed.   Constitutional:       General: He is not in acute distress.     Appearance: He is not diaphoretic.      Interventions: Nasal cannula in place.   HENT:      Head: Normocephalic. No contusion.      Mouth/Throat:      Mouth: Mucous membranes are moist.      Pharynx: No oropharyngeal exudate.   Eyes:      General:         Right eye: No discharge.         Left eye: No discharge.      Extraocular Movements: Extraocular movements intact.   Neck:      Musculoskeletal: Normal range of motion. No neck rigidity.   Cardiovascular:      Rate and Rhythm: Normal rate and regular rhythm.      Pulses: Normal pulses.   Pulmonary:      Effort: Pulmonary effort is normal. No respiratory distress.   Abdominal:      General: There is no distension.      Palpations: Abdomen is soft.      Tenderness: There is no abdominal tenderness. There is no guarding.   Genitourinary:     Comments: Tenderness on scrotal area. Skin graft in place with minimal drainage  Musculoskeletal:         General: No tenderness.      Right lower leg: No edema.      Left lower leg: No edema.   Skin:     General: Skin is dry.      Coloration: Skin is not jaundiced.      Comments: Right thigh with dressing in place, dried drainage   Neurological:      General: No focal deficit present.      Mental Status: He is alert.      Cranial Nerves: No cranial nerve deficit.      Sensory: No sensory deficit.   Psychiatric:         Mood and Affect: Mood normal.         Behavior: Behavior is cooperative.         Fluids    Intake/Output Summary (Last 24 hours) at 8/28/2020 6343  Last data filed at 8/28/2020 1387  Gross  per 24 hour   Intake 1080 ml   Output 1950 ml   Net -870 ml       Laboratory  Recent Labs     08/26/20  0526 08/27/20  0707 08/28/20  0505   WBC 5.4 5.2 3.4*   RBC 4.39* 4.45* 4.48*   HEMOGLOBIN 11.2* 11.3* 11.3*   HEMATOCRIT 36.1* 37.1* 37.3*   MCV 82.2 83.4 83.3   MCH 25.5* 25.4* 25.2*   MCHC 31.0* 30.5* 30.3*   RDW 55.4* 58.3* 57.7*   PLATELETCT 434 397 366   MPV 10.7 11.5 12.2     Recent Labs     08/26/20  0526 08/28/20  0505   SODIUM 140 138   POTASSIUM 3.9 3.7   CHLORIDE 104 102   CO2 25 24   GLUCOSE 130* 120*   BUN 14 17   CREATININE 0.63 0.70   CALCIUM 9.1 9.2                   Imaging  No orders to display        Assessment/Plan  * 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 ID evaluated on last hospitalization  - s/p 2 week course of abx s/p multiple washouts  - s/p graft placement on 8/23  - wound vac removed 8/27  - pain control  - continue gabapentin  - declining SNF; referral for  sent    Hypokalemia- (present on admission)  Assessment & Plan  Improving. Mild at 3.5. In the setting of hypomag.  - monitor and replete K and Mg    Type 2 diabetes mellitus, with long-term current use of insulin (HCC)- (present on admission)  Assessment & Plan  With hyperglycemia. A1C uncontrolled at 8.6%. Takes insulin at home.  - reduce to moderate sliding scale  - DM diet and hypoglycemia protocol    Chronic systolic heart failure (HCC)- (present on admission)  Assessment & Plan  EF 30%. No acute exacerbation and he is euvolemic  - cardiology evaluated, appreciate recs  - monitor clinically  - there was mention of possible ICD placement outpatient    Paroxysmal A-fib (HCC)- (present on admission)  Assessment & Plan  Rate controlled. No longer requiring midodrine for BP.  - discussed with plastic surgery and urology, ok to resume anticoagulation  - eliquis    Urinary retention  Assessment & Plan  Voiding trial today  - bladder scan q6b hrs    History of TIA  (transient ischemic attack)- (present on admission)  Assessment & Plan  - Continue aspirin    SLE (systemic lupus erythematosus related syndrome) (HCC)- (present on admission)  Assessment & Plan  - Holding steroid in setting of acute infection; last dose was July 31. No signs of adrenal insufficiency.  - Continue azathioprine  - restart naproxen BID PRN    Polycythemia vera (HCC)- (present on admission)  Assessment & Plan  Plt improving from 786 --> 434 --> 397 today. Improving.   - Continue hydroxyurea, dose lowered today  - outpatient follow up with heme/onc    Stenosis of right carotid artery-Right CEA 3/21/18- (present on admission)  Assessment & Plan  - Continue aspirin      VTE prophylaxis: home eliquis for afib

## 2020-08-28 NOTE — PROGRESS NOTES
Bedside report received. Assessment completed.  Pt is A&O x4. Pt on room air.   Pain 2/10.  Denies nausea.   - numbness, - tingling.  Right thigh skin graft donor site with dressing in place.   Scrotum with dressing in place, CDI. Daily dressing changes per order.   Last BM 8/27 . +flatus,   Andres catheter in place, output is clear and yellow.  Diabetic diet. Tolerates well.   Pt up x1 with FWW. Tolerates well.   Call light within reach. All needs met at this time. Fall Precautions and hourly rounding in place.

## 2020-08-28 NOTE — PROGRESS NOTES
Oncology/Hematology Progress Note               Author: Jigar Blevins M.D.    Cc: Myeloproliferative disorder         Anthony worthington Date & Time created: 8/28/2020  4:49 PM     Interval History:  He is doing pretty well.  He has no fevers, chills, night sweats.  He has a little tenderness in the right lower abdomen.  He has no headaches or nausea or vomiting.  The plan is for him to discharge home tomorrow.      PMHx, PSurgHx, SocHX, FAMHx:   All reviewed and no changes    Review of Systems:  Review of Systems   Constitutional: Negative for chills, fever and weight loss.   HENT: Negative for congestion, nosebleeds and sinus pain.    Eyes: Negative for pain, discharge and redness.   Respiratory: Negative for cough, hemoptysis, sputum production and shortness of breath.    Cardiovascular: Negative for chest pain, palpitations, orthopnea and leg swelling.   Gastrointestinal: Negative for abdominal pain, diarrhea, heartburn, nausea and vomiting.   Genitourinary: Negative for dysuria, frequency and urgency.   Skin: Negative for itching and rash.       Physical Exam:  Physical Exam  Vitals signs reviewed.   Constitutional:       General: He is not in acute distress.     Appearance: Normal appearance. He is not ill-appearing or toxic-appearing.   HENT:      Head: Normocephalic and atraumatic.      Mouth/Throat:      Mouth: Mucous membranes are moist.      Pharynx: Oropharynx is clear. No oropharyngeal exudate or posterior oropharyngeal erythema.   Eyes:      General: No scleral icterus.        Right eye: No discharge.         Left eye: No discharge.      Extraocular Movements: Extraocular movements intact.      Conjunctiva/sclera: Conjunctivae normal.   Neck:      Musculoskeletal: Normal range of motion and neck supple. No muscular tenderness.   Cardiovascular:      Rate and Rhythm: Normal rate and regular rhythm.      Heart sounds: No murmur. No gallop.    Pulmonary:      Effort: Pulmonary effort is normal. No  respiratory distress.      Breath sounds: Normal breath sounds. No rales.   Abdominal:      General: Abdomen is flat. Bowel sounds are normal. There is no distension.      Palpations: Abdomen is soft.      Tenderness: There is no abdominal tenderness. There is no guarding.   Musculoskeletal: Normal range of motion.         General: No swelling.      Right lower leg: No edema.      Left lower leg: No edema.   Skin:     General: Skin is warm and dry.      Findings: No erythema or rash.   Neurological:      General: No focal deficit present.      Mental Status: He is alert and oriented to person, place, and time. Mental status is at baseline.   Psychiatric:         Mood and Affect: Mood normal.         Judgment: Judgment normal.         Labs:          Recent Labs     20  0526 20  0505   SODIUM 140 138   POTASSIUM 3.9 3.7   CHLORIDE 104 102   CO2 25 24   BUN 14 17   CREATININE 0.63 0.70   MAGNESIUM 1.9 1.8   CALCIUM 9.1 9.2     Recent Labs     20  0526 20  0505   GLUCOSE 130* 120*     Recent Labs     20  0520  0707 20  0505   RBC 4.39* 4.45* 4.48*   HEMOGLOBIN 11.2* 11.3* 11.3*   HEMATOCRIT 36.1* 37.1* 37.3*   PLATELETCT 434 397 366     Recent Labs     20  0520  0707 20  0505   WBC 5.4 5.2 3.4*   NEUTSPOLYS 77.20* 79.70* 72.30*   LYMPHOCYTES 12.90* 8.80* 13.70*   MONOCYTES 5.50 6.90 7.30   EOSINOPHILS 1.50 1.90 4.10   BASOPHILS 1.80 1.90* 2.00*     Recent Labs     20  0526 20  0505   SODIUM 140 138   POTASSIUM 3.9 3.7   CHLORIDE 104 102   CO2 25 24   GLUCOSE 130* 120*   BUN 14 17   CREATININE 0.63 0.70   CALCIUM 9.1 9.2     Hemodynamics:  Temp (24hrs), Av.6 °C (97.9 °F), Min:36.2 °C (97.2 °F), Max:37.3 °C (99.1 °F)  Temperature: 36.6 °C (97.9 °F)  Pulse  Av.4  Min: 60  Max: 87   Blood Pressure : 118/84     Respiratory:    Respiration: 18, Pulse Oximetry: 96 %        RUL Breath Sounds: Clear, RML Breath Sounds: Clear, RLL Breath  Sounds: Diminished, EVELINA Breath Sounds: Clear, LLL Breath Sounds: Diminished  Fluids:    Intake/Output Summary (Last 24 hours) at 8/28/2020 1649  Last data filed at 8/28/2020 1600  Gross per 24 hour   Intake 1560 ml   Output 2000 ml   Net -440 ml        GI/Nutrition:  Orders Placed This Encounter   Procedures   • Diet Order Diabetic     Standing Status:   Standing     Number of Occurrences:   1     Order Specific Question:   Diet:     Answer:   Diabetic [3]     Medical Decision Making, by Problem:  Active Hospital Problems    Diagnosis   • *Landon's gangrene of scrotum [N49.3]   • Hypokalemia [E87.6]   • Type 2 diabetes mellitus, with long-term current use of insulin ([facility]) [E11.9, Z79.4]   • Chronic systolic heart failure ([facility]) [I50.22]   • Paroxysmal A-fib ([facility]) [I48.0]   • Stenosis of right carotid artery-Right CEA 3/21/18 [I65.21]   • Urinary retention [R33.9]   • History of TIA (transient ischemic attack) [Z86.73]   • SLE (systemic lupus erythematosus related syndrome) ([facility]) [M32.9]   • Polycythemia vera ([facility]) [D45]       Plan:  1.  Myeloproliferative disorder-- he has a history of a myeloproliferative disorder/polycythemia vera.  He follows with Dr. Sims in our office.  He is managed on Hydrea, as well as occasional phlebotomies.  As an outpatient he was on 1000 mg/day alternating with 500 mg/day.        His Hydrea was stopped initially in the setting of infection with his initial admission.  Subsequently his counts increased including a platelet count, which peaked at 942.  He was put back on his Hydrea at 2500 mg daily.  Gradually as his counts have come down, the Hydrea dose has been reduced.  About a week ago he was put on 1500 mg daily.  On 8/27 I drop the dose to 1000 mg daily.    --I reviewed his blood counts today.  --His WBC is on the low end of normal at 3.4, with an ANC of 2.5.  His platelets are at goal at 360.  --I will drop his Hydrea to 500 mg dose tonight.    --If he discharges tomorrow, I  would put him back on his home dose of Hydrea which was 1000 mg/day alternating with 500 mg/day.    --He would then follow-up with Dr. Sims next week in the office for repeat CBC.      2.  Landon's gangrene--he has had a complex hospitalization related to this infection.  His wound grew out Candida albicans.  He did a 2-week course of antibiotics including fluconazole as well as Unasyn finishing on 8/17/2020.         He had multiple I&D as well as washout surgeries including a surgery on 7/25, 7/29, 7/31, as well as wound VAC placement on 8/3.  Eventually had a skin graft closure procedure on 8/23.    --Continue to follow with urology as well as plastic surgery  --Seems to be recovering well from the infection.            Quality-Core Measures   Reviewed items::  Labs reviewed and Medications reviewed

## 2020-08-28 NOTE — CARE PLAN
Problem: Knowledge Deficit  Goal: Knowledge of disease process/condition, treatment plan, diagnostic tests, and medications will improve  Outcome: PROGRESSING AS EXPECTED  Note: Pt updated on plan of care, educated about medications, labs, and dressing changes  Problem: Pain Management  Goal: Pain level will decrease to patient's comfort goal  Outcome: PROGRESSING AS EXPECTED  Note: Pt educated on PRN pain medication

## 2020-08-28 NOTE — PROGRESS NOTES
Received report from day shift RN.   Assessment complete. VSS  A+Ox4, patient calls appropriately.   Patient reports 8/10 pain to wound, medicated per MAR.   Patient ambulates with one person assist  Denies N/V, tolerating a diabetic diet.   + flatus  +BM  + void  Patient on RA, denies SOB  SCDs on. Denies numbness and tingling.   Patient is resting comfortably in bed. Reviewed plan of care. Call light and personal belongings in reach. Bed locked and in lowest position. Hourly rounding in place. All needs met at this time.

## 2020-08-28 NOTE — ASSESSMENT & PLAN NOTE
Successful voiding trial 8/28  - bladder scan PRN  - monitor clinically; reported a clot vs hematuria x1 episode

## 2020-08-28 NOTE — THERAPY
"Physical Therapy   Daily Treatment     Patient Name: Francesco Schulte  Age:  70 y.o., Sex:  male  Medical Record #: [id number]  Today's Date: 8/28/2020     Precautions: Fall Risk    Assessment    Pt progressing well w/ therapy. Pt was able to perform all mobility w/out assist. He doesn't like hands on assist during gait because \"it messes up my balance.\" Pt w/ no LOB during gait and was able to ambulate community distances. Pt was enquiring about getting a SPC but was open to education on stability of FWW and progressing to a SPC w/ HH. Pt stating good support at home w/ wife and dtrs that are RN's. Pt encouraged to continue ambulating w/ the nursing staff and stating \"I enjoy getting up so that's ok.\"    Plan    Continue current treatment plan.    DC Equipment Recommendations: Front-Wheel Walker  Discharge Recommendations: Recommend home health for continued physical therapy services      Subjective    \"I almost have too much help at home.\"     Objective       08/28/20 0818   Precautions   Precautions Fall Risk   Comments Wound vac removed   Gait Analysis   Gait Level Of Assist Supervised   Assistive Device Front Wheel Walker   Distance (Feet) 500   # of Times Distance was Traveled 2   Deviation Other (Comment)  (Forward flexed)   Comments Pt tends to look down during gait. States he needs to be distracted or he thinks too much about R LE pain.   Bed Mobility    Supine to Sit   (Up in halls)   Sit to Supine Supervised   Scooting Supervised   Rolling Supervised   Comments Pt stating that he will probably sleep in a recliner because it is easier to get out of.   Functional Mobility   Sit to Stand Supervised   Bed, Chair, Wheelchair Transfer Supervised   Transfer Method Other (Comments)  (Ambulating)   Mobility With FWW   Short Term Goals    Short Term Goal # 1 Pt will bed mobility at SPV level within 6tx in order to improve functional independence.   Goal Outcome # 1 Goal met   Short Term Goal # 2 Pt will " ambulate 250ft using FWW at SPV level within 6tx in order to improve independence.   Goal Outcome # 2 Goal met

## 2020-08-28 NOTE — THERAPY
"Occupational Therapy  Daily Treatment     Patient Name: Francesco Schulte  Age:  70 y.o., Sex:  male  Medical Record #: [id number]  Today's Date: 8/28/2020     Precautions  Precautions: (P) Fall Risk  Comments: (P) wound VAC     Assessment    Pt requested pink theraband to use for B UE's. Pt provided with HEP, reports understanding and demo'd good follow through.     Plan    Continue current treatment plan.    DC Equipment Recommendations: (P) Unable to determine at this time  Discharge Recommendations: (P) Recommend home health for continued occupational therapy services    Subjective    \"I wont just sit on the couch I promise\"     Objective       08/28/20 1502   Cognition    Cognition / Consciousness WDL   Level of Consciousness Alert   Comments hyperverbose, motivated   Sitting Upper Body Exercises   Sitting Upper Body Exercises Yes   Front Arm Raise 1 set of 10   Shoulder Press 1 set of 10   Elbow Extension 1 set of 10   Comments done bilaterally w/ pink theraband   Activities of Daily Living   Comments declined other ADLs at this time. Reports no concerns about going home and performing tasks.    Anticipated Discharge Equipment and Recommendations   Discharge Recommendations Recommend home health for continued occupational therapy services         "

## 2020-08-29 ENCOUNTER — APPOINTMENT (OUTPATIENT)
Dept: RADIOLOGY | Facility: MEDICAL CENTER | Age: 70
DRG: 577 | End: 2020-08-29
Attending: HOSPITALIST
Payer: MEDICARE

## 2020-08-29 LAB
ANISOCYTOSIS BLD QL SMEAR: ABNORMAL
BASOPHILS # BLD AUTO: 4.3 % (ref 0–1.8)
BASOPHILS # BLD: 0.18 K/UL (ref 0–0.12)
EOSINOPHIL # BLD AUTO: 0.18 K/UL (ref 0–0.51)
EOSINOPHIL NFR BLD: 4.3 % (ref 0–6.9)
ERYTHROCYTE [DISTWIDTH] IN BLOOD BY AUTOMATED COUNT: 56.1 FL (ref 35.9–50)
GLUCOSE BLD-MCNC: 118 MG/DL (ref 65–99)
GLUCOSE BLD-MCNC: 121 MG/DL (ref 65–99)
GLUCOSE BLD-MCNC: 179 MG/DL (ref 65–99)
GLUCOSE BLD-MCNC: 189 MG/DL (ref 65–99)
HCT VFR BLD AUTO: 38.2 % (ref 42–52)
HGB BLD-MCNC: 12.1 G/DL (ref 14–18)
LYMPHOCYTES # BLD AUTO: 0.63 K/UL (ref 1–4.8)
LYMPHOCYTES NFR BLD: 15.4 % (ref 22–41)
MACROCYTES BLD QL SMEAR: ABNORMAL
MANUAL DIFF BLD: NORMAL
MCH RBC QN AUTO: 25.4 PG (ref 27–33)
MCHC RBC AUTO-ENTMCNC: 31.7 G/DL (ref 33.7–35.3)
MCV RBC AUTO: 80.1 FL (ref 81.4–97.8)
MONOCYTES # BLD AUTO: 0.04 K/UL (ref 0–0.85)
MONOCYTES NFR BLD AUTO: 0.9 % (ref 0–13.4)
MORPHOLOGY BLD-IMP: NORMAL
NEUTROPHILS # BLD AUTO: 3.08 K/UL (ref 1.82–7.42)
NEUTROPHILS NFR BLD: 75.2 % (ref 44–72)
NRBC # BLD AUTO: 0 K/UL
NRBC BLD-RTO: 0 /100 WBC
OVALOCYTES BLD QL SMEAR: NORMAL
PLATELET # BLD AUTO: 309 K/UL (ref 164–446)
PLATELET BLD QL SMEAR: NORMAL
PMV BLD AUTO: 11.1 FL (ref 9–12.9)
POIKILOCYTOSIS BLD QL SMEAR: NORMAL
POLYCHROMASIA BLD QL SMEAR: NORMAL
RBC # BLD AUTO: 4.77 M/UL (ref 4.7–6.1)
RBC BLD AUTO: PRESENT
SCHISTOCYTES BLD QL SMEAR: NORMAL
WBC # BLD AUTO: 4.1 K/UL (ref 4.8–10.8)

## 2020-08-29 PROCEDURE — 700102 HCHG RX REV CODE 250 W/ 637 OVERRIDE(OP): Performed by: NURSE PRACTITIONER

## 2020-08-29 PROCEDURE — 36415 COLL VENOUS BLD VENIPUNCTURE: CPT

## 2020-08-29 PROCEDURE — 74177 CT ABD & PELVIS W/CONTRAST: CPT

## 2020-08-29 PROCEDURE — A9270 NON-COVERED ITEM OR SERVICE: HCPCS | Performed by: INTERNAL MEDICINE

## 2020-08-29 PROCEDURE — 770001 HCHG ROOM/CARE - MED/SURG/GYN PRIV*

## 2020-08-29 PROCEDURE — 700111 HCHG RX REV CODE 636 W/ 250 OVERRIDE (IP): Performed by: HOSPITALIST

## 2020-08-29 PROCEDURE — 700102 HCHG RX REV CODE 250 W/ 637 OVERRIDE(OP): Performed by: INTERNAL MEDICINE

## 2020-08-29 PROCEDURE — 700102 HCHG RX REV CODE 250 W/ 637 OVERRIDE(OP): Performed by: HOSPITALIST

## 2020-08-29 PROCEDURE — 82962 GLUCOSE BLOOD TEST: CPT | Mod: 91

## 2020-08-29 PROCEDURE — 85007 BL SMEAR W/DIFF WBC COUNT: CPT

## 2020-08-29 PROCEDURE — A9270 NON-COVERED ITEM OR SERVICE: HCPCS | Performed by: NURSE PRACTITIONER

## 2020-08-29 PROCEDURE — 99232 SBSQ HOSP IP/OBS MODERATE 35: CPT | Performed by: HOSPITALIST

## 2020-08-29 PROCEDURE — 700111 HCHG RX REV CODE 636 W/ 250 OVERRIDE (IP): Performed by: NURSE PRACTITIONER

## 2020-08-29 PROCEDURE — 700117 HCHG RX CONTRAST REV CODE 255: Performed by: HOSPITALIST

## 2020-08-29 PROCEDURE — A9270 NON-COVERED ITEM OR SERVICE: HCPCS | Performed by: HOSPITALIST

## 2020-08-29 PROCEDURE — 85027 COMPLETE CBC AUTOMATED: CPT

## 2020-08-29 RX ORDER — PROCHLORPERAZINE EDISYLATE 5 MG/ML
10 INJECTION INTRAMUSCULAR; INTRAVENOUS EVERY 6 HOURS PRN
Status: DISCONTINUED | OUTPATIENT
Start: 2020-08-29 | End: 2020-08-31 | Stop reason: HOSPADM

## 2020-08-29 RX ORDER — OXYCODONE HYDROCHLORIDE 10 MG/1
10 TABLET ORAL EVERY 4 HOURS PRN
Status: DISCONTINUED | OUTPATIENT
Start: 2020-08-29 | End: 2020-08-31 | Stop reason: HOSPADM

## 2020-08-29 RX ADMIN — IOHEXOL 90 ML: 350 INJECTION, SOLUTION INTRAVENOUS at 12:13

## 2020-08-29 RX ADMIN — OXYCODONE HYDROCHLORIDE 10 MG: 10 TABLET ORAL at 14:56

## 2020-08-29 RX ADMIN — GABAPENTIN 600 MG: 300 CAPSULE ORAL at 20:23

## 2020-08-29 RX ADMIN — APIXABAN 5 MG: 5 TABLET, FILM COATED ORAL at 08:07

## 2020-08-29 RX ADMIN — ONDANSETRON 4 MG: 2 INJECTION INTRAMUSCULAR; INTRAVENOUS at 14:56

## 2020-08-29 RX ADMIN — INSULIN HUMAN 2 UNITS: 100 INJECTION, SOLUTION PARENTERAL at 11:10

## 2020-08-29 RX ADMIN — OMEPRAZOLE 20 MG: 20 CAPSULE, DELAYED RELEASE ORAL at 08:06

## 2020-08-29 RX ADMIN — AZATHIOPRINE 50 MG: 50 TABLET ORAL at 20:23

## 2020-08-29 RX ADMIN — ASPIRIN 81 MG: 81 TABLET, COATED ORAL at 08:07

## 2020-08-29 RX ADMIN — IOHEXOL 25 ML: 240 INJECTION, SOLUTION INTRATHECAL; INTRAVASCULAR; INTRAVENOUS; ORAL at 12:13

## 2020-08-29 RX ADMIN — INSULIN HUMAN 2 UNITS: 100 INJECTION, SOLUTION PARENTERAL at 20:27

## 2020-08-29 RX ADMIN — GABAPENTIN 600 MG: 300 CAPSULE ORAL at 08:07

## 2020-08-29 RX ADMIN — NAPROXEN 750 MG: 250 TABLET ORAL at 11:07

## 2020-08-29 RX ADMIN — PROCHLORPERAZINE EDISYLATE 10 MG: 5 INJECTION INTRAMUSCULAR; INTRAVENOUS at 17:09

## 2020-08-29 RX ADMIN — ONDANSETRON 4 MG: 4 TABLET, ORALLY DISINTEGRATING ORAL at 08:07

## 2020-08-29 RX ADMIN — THERA TABS 1 TABLET: TAB at 08:06

## 2020-08-29 RX ADMIN — AZATHIOPRINE 50 MG: 50 TABLET ORAL at 08:07

## 2020-08-29 RX ADMIN — APIXABAN 5 MG: 5 TABLET, FILM COATED ORAL at 17:09

## 2020-08-29 RX ADMIN — MORPHINE SULFATE 15 MG: 15 TABLET, FILM COATED, EXTENDED RELEASE ORAL at 20:23

## 2020-08-29 RX ADMIN — HYDROXYUREA 500 MG: 500 CAPSULE ORAL at 20:23

## 2020-08-29 RX ADMIN — GABAPENTIN 600 MG: 300 CAPSULE ORAL at 14:56

## 2020-08-29 ASSESSMENT — ENCOUNTER SYMPTOMS
NERVOUS/ANXIOUS: 0
PALPITATIONS: 0
FEVER: 0
SHORTNESS OF BREATH: 0
CHILLS: 0
SPUTUM PRODUCTION: 0
VOMITING: 0
EYE PAIN: 0
DIAPHORESIS: 0
EYE DISCHARGE: 0
HALLUCINATIONS: 0
DIZZINESS: 0
HEADACHES: 0
MYALGIAS: 1
SINUS PAIN: 0
NECK PAIN: 0
FLANK PAIN: 0
SPEECH CHANGE: 0
NAUSEA: 0
COUGH: 0
ABDOMINAL PAIN: 0
FOCAL WEAKNESS: 0

## 2020-08-29 NOTE — CONSULTS
[facility] [place] Consult/H&P Note    Primary Service: Hospitalist  Attending: Anitra Clemente M.D.  Patient's Name/MRN: Francesco Schulte, [id number]    Admit Date:8/22/2020  Today's Date: 8/29/2020   Length of stay:  LOS: 7 days   Room #: T424/02      Reason for consult/chief complaint: kira's gangrene   ID/HPI: Francesco Schulte is a 70 y.o. male patient with past medical history of polycythemia, lupus, CAD, TIA, chronic systolic heart failure, PAF presented to the hospital on July 25, 2020 with a scrotal pain and swelling due to right scrotal abscess and Kira's gangrene.  Urology was emergently consulted and he underwent washout on July 25 with 3 more subsequent washouts on July 29, July 31 and August 3 and wound VAC was placed.  Culture showed Abigail albicans and infectious disease consulted and he completed the course of Unasyn and fluconazole on August 17, 2020. He then underwent skin graft with plastic surgery on 8/23. Our services were re-consulted d/t new onset R groin pain this AM, CT with small air gas in R hemiscrotum. Denies any issues urinating. Pain is not what it originally was when I saw him 3w prior. Afebrile. No chills.        Past Medical History:   Past Medical History:   Diagnosis Date   • Anesthesia     resistant to pain meds   • Cancer (HCC)     polycythemia vera   • Diabetes (HCC)     insulin and oral meds   • Family history of punctured lung 2002    left lung   • Heart attack (HCC) 03/2017   • Hemorrhagic disorder (HCC)     on blood thinners   • High cholesterol    • Ischemic cardiomyopathy    • Kidney stones     hx of kidney stones   • Orthostatic hypotension 3/29/2018   • Pain     headache pain   • Polycythemia vera(238.4)    • Stroke (HCC) 2016    r/t afib; eye issues resolved afterward   • Urinary bladder disorder     enlarged prostate, weak stream, difficulty urinating   • Vertigo         Past Surgical History:   Past Surgical History:   Procedure Laterality Date   •  SPLIT THICKNESS SKIN GRAFT N/A 8/23/2020    Procedure: APPLICATION, GRAFT, SKIN, SPLIT-THICKNESS;  Surgeon: Elisa Craig M.D.;  Location: [facility];  Service: Plastics   • SKIN ABSCESS INCISION AND DRAINAGE Right 8/3/2020    Procedure: INCISION AND DRAINAGE - SCROTAL WOUND - WOUND VAC PLACEMENT;  Surgeon: Jaylen Hayes M.D.;  Location: [facility];  Service: Urology   • PB EXPLORE SCROTUM Right 7/31/2020    Procedure: EXPLORATION, SCROTUM - FOR WASH OUT OF SCROTAL WOUND & WOUND VAC PLACEMENT;  Surgeon: Ferny Rosales M.D.;  Location: [facility];  Service: Urology   • PB EXPLORE SCROTUM Right 7/29/2020    Procedure: EXPLORATION, SCROTUM - FOR I & D OF SCROTAL AND  GROIN WOUND;  Surgeon: Donta Viera M.D.;  Location: [facility];  Service: Urology   • PB INCIS/DRAIN SCROTUM/TESTIS,EPIDIDYM Right 7/25/2020    Procedure: INCISION AND DRAINAGE, SCROTUM;  Surgeon: Nick Negro M.D.;  Location: [facility];  Service: Urology   • TEMPORAL ARTERY BIOPSY Right 6/26/2018    Procedure: TEMPORAL ARTERY BIOPSY;  Surgeon: Rajendra Aguilar M.D.;  Location: SURGERY [facility] DAY [facility];  Service: General   • CAROTID ENDARTERECTOMY Right 3/21/2018    Procedure: CAROTID ENDARTERECTOMY- W/EEG MONITORING;  Surgeon: Benny Morfin M.D.;  Location: SUR[facility];  Service: General   • APPENDECTOMY     • CATH PLACEMENT      right arm   • LAMINOTOMY      diskectomy; C4   • OTHER CARDIAC SURGERY      stents x 3        Family History:   History reviewed. No pertinent family history.      Social History:   Social History     Tobacco Use   • Smoking status: Never Smoker   • Smokeless tobacco: Never Used   Substance Use Topics   • Alcohol use: No   • Drug use: No      Social History     Social History Narrative   • Not on file        Allergies: he Doxycycline, Beta adrenergic blockers, Metformin, Simvastatin, and Statins [hmg-coa-r  "inhibitors]    Medications:   Medications Prior to Admission   Medication Sig Dispense Refill Last Dose   • azaTHIOprine (IMURAN) 50 MG Tab Take 50 mg by mouth 2 Times a Day. TAKE 1 TABLET BY MOUTH TWICE A DAY   8/22/2020 at 0821   • midodrine (PROAMATINE) 5 MG Tab Take 15 mg by mouth every evening.      • predniSONE (DELTASONE) 20 MG Tab Take 40 mg by mouth every day.      • omeprazole (PRILOSEC) 20 MG delayed-release capsule Take 1 Cap by mouth every day. 30 Cap 1 8/22/2020 at 0821   • finasteride (PROSCAR) 5 MG Tab Take 5 mg by mouth every evening.      • Insulin Degludec (TRESIBA FLEXTOUCH) 200 UNIT/ML Solution Pen-injector Inject 18 Units as instructed every evening.      • tamsulosin (FLOMAX) 0.4 MG capsule Take 0.8 mg by mouth every evening.      • naproxen (NAPROSYN) 250 MG Tab Take 750 mg by mouth 2 times a day as needed. Indications: Pain      • apixaban (ELIQUIS) 5mg Tab Take 1 Tab by mouth 2 Times a Day. 180 Tab 3 8/22/2020 at 0730   • aspirin EC (ECOTRIN) 81 MG Tablet Delayed Response Take 1 Tab by mouth every day. 30 Tab  8/22/2020 at 0800   • therapeutic multivitamin-minerals (THERAGRAN-M) Tab Take 1 Tab by mouth every day.      • hydroxyurea (HYDREA) 500 MG Cap Take 500 mg by mouth See Admin Instructions. Alternating doses  Take 500 mg once daily then the next day takes 500 mg twice daily  Then repeat   8/22/2020 at 2000         Review of Systems  Review of Systems   Constitutional: Negative for chills and fever.   Gastrointestinal: Negative for nausea and vomiting.   Genitourinary: Negative for dysuria, frequency and urgency.        + right groin pain        Physical Exam  VITAL SIGNS: /82   Pulse 83   Temp 36.4 °C (97.6 °F) (Temporal)   Resp 16   Ht 1.892 m (6' 2.5\")   Wt 82 kg (180 lb 12.4 oz)   SpO2 96%   BMI 22.90 kg/m²   Physical Exam   Constitutional: He is oriented to person, place, and time and well-developed, well-nourished, and in no distress.   HENT:   Head: Normocephalic and " atraumatic.   Eyes: Pupils are equal, round, and reactive to light.   Pulmonary/Chest: Effort normal.   Abdominal: Soft.   Genitourinary:    Genitourinary Comments: + mild pain in right groin. Non-TTP. No erythema. Skin graft to right hemiscrotum with healthy tissue well approximated. Minimal purulent drainage. No blood.      Musculoskeletal: Normal range of motion.   Neurological: He is alert and oriented to person, place, and time.   Skin: Skin is warm.   Nursing note and vitals reviewed.        Labs:  Recent Labs     08/27/20  0707 08/28/20  0505 08/29/20  0626   WBC 5.2 3.4* 4.1*   RBC 4.45* 4.48* 4.77   HEMOGLOBIN 11.3* 11.3* 12.1*   HEMATOCRIT 37.1* 37.3* 38.2*   MCV 83.4 83.3 80.1*   MCH 25.4* 25.2* 25.4*   MCHC 30.5* 30.3* 31.7*   RDW 58.3* 57.7* 56.1*   PLATELETCT 397 366 309   MPV 11.5 12.2 11.1     Recent Labs     08/28/20  0505   SODIUM 138   POTASSIUM 3.7   CHLORIDE 102   CO2 24   GLUCOSE 120*   BUN 17   CREATININE 0.70   CALCIUM 9.2         Glucose:  Recent Labs     08/28/20  0505   GLUCOSE 120*     Coags:  No results for input(s): INR in the last 72 hours.      Urinalysis:   No results for input(s): COLORURINE, CLARITY, SPECGRAVITY, PHURINE, GLUCOSEUR, KETONES, NITRITE, OCCULTBLOOD, RBCURINE, BACTERIA, EPITHELCELL in the last 72 hours.    Invalid input(s): BILRUBINUR, LEUESTERASE    Imaging:  CT-ABDOMEN-PELVIS WITH   Final Result      1.  Small gas identified in the right hemiscrotum posteriorly. This is concerning for Landon's gangrene. The appearance however is markedly improved from just over one month ago      2.  Otherwise stable evaluation with evidence of mild retroperitoneal adenopathy which most likely is reactive.      3.  Prostate enlargement with bladder wall thickening likely from chronic outlet obstruction. Recommend clinical correlation      4.  Mild splenomegaly      5.  Nonobstructive 5 mm nephrolith      6.  Medium-sized pericardial effusion, left ventricular dilatation, chronic  infarction          @lastct@     Assessment/Recommendation   70 y.o. male with kira's gangrene s/p multiple debridements. Re-consulted for new onset R groin pain this AM with CT findings of small air gas in R hemiscrotum.      · PLAN:  · Reviewed CT, recommend possible need for abx, appreciate ID recs.  · No clinical indication for re-debridement at this time. Gas in hemiscrotum improved from prior CT.   · Urology will continue to follow while in patient.   · Dr. Jackson is aware of this consult and dictated the plan of care.        Savanna Self, P.A.-C.   [address]  [place] [address]   [phone]

## 2020-08-29 NOTE — PROGRESS NOTES
Received report from day shift RN.   Assessment complete. VSS  A+Ox4, patient calls appropriately.   Patient reports 8/10 pain to wound, medicated per MAR.   Patient ambulates up self  Denies N/V, tolerating a diabetic diet.   + flatus  +BM  + void  Patient on RA, denies SOB  SCDs on. Denies numbness and tingling.   Patient is resting comfortably in bed. Reviewed plan of care. Call light and personal belongings in reach. Bed locked and in lowest position. Hourly rounding in place. All needs met at this time.

## 2020-08-29 NOTE — PROGRESS NOTES
Bedside report received. Assessment completed.  Pt is A&O x4. Pt on room air.   Pain 4/10.  Denies nausea.   - numbness, - tingling.  Right thigh skin graft donor site with dressing in place.   Scrotum with dressing in place, CDI. Daily dressing changes per order.   Last BM 8/28. +flatus, + void  Diabetic diet. Tolerates well.   Pt up SBA with FWW. Tolerates well.   Call light within reach. All needs met at this time. Fall Precautions and hourly rounding in place.

## 2020-08-29 NOTE — CARE PLAN
Problem: Knowledge Deficit  Goal: Knowledge of disease process/condition, treatment plan, diagnostic tests, and medications will improve  Outcome: PROGRESSING AS EXPECTED  Note: Pt educated about medications and labs, updated on plan of care, educated about CT scan     Problem: Skin Integrity  Goal: Risk for impaired skin integrity will decrease  Outcome: PROGRESSING AS EXPECTED  Note: Skin graft dressing intact, q day dressing changes, pt educated about wound supplies

## 2020-08-29 NOTE — PROGRESS NOTES
[facility] Medicine Daily Progress Note    Date of Service  8/29/2020    Chief Complaint  70 y.o. male admitted 8/22/2020 with scrotal pain    Hospital Course    *70-year-old male with past medical history of polycythemia, lupus, CAD, TIA, chronic systolic heart failure, PAF presented to the hospital on July 25, 2020 with a scrotal pain and swelling due to right scrotal abscess and Landon's gangrene.  Urology was emergently consulted and he underwent washout on July 25 with 3 more subsequent washout on July 29, July 31 and August 3 and wound VAC was placed.  Culture showed Abigail albicans and infectious disease consulted and he completed the course of Unasyn and fluconazole on August 17, 2020.  Patient transferred to [facility] from [facility] for plastic surgery consult.*      Interval Problem Update  Wound vac off and go out. Was doing well but having increased pelvic discomfort and noted hematuria vs he passed a clot while urinating. CT abd/pelvis pending.     Consultants/Specialty  Urology  Infectious disease  Plastic surgery    Code Status  Full Code    Disposition  PT recommended SNF. Patient declines, he would like to progress here and then d/c home with HH.     Anticipate home tomorrow with HH (accepted).     Review of Systems  Review of Systems   Constitutional: Positive for malaise/fatigue (improved today). Negative for chills, diaphoresis and fever.   HENT: Negative for congestion and sinus pain.    Eyes: Negative for pain and discharge.   Respiratory: Negative for cough, sputum production and shortness of breath.    Cardiovascular: Negative for chest pain, palpitations and leg swelling.   Gastrointestinal: Negative for abdominal pain (pelvic pain), nausea and vomiting.   Genitourinary: Positive for hematuria. Negative for dysuria, flank pain, frequency and urgency.        Scrotal pain, improving   Musculoskeletal: Positive for myalgias (at skin graft site when mobilizing). Negative for joint  "pain and neck pain.   Skin: Positive for rash (\"road rash\" right thigh improving).   Neurological: Negative for dizziness, speech change, focal weakness and headaches.   Psychiatric/Behavioral: Negative for hallucinations. The patient is not nervous/anxious.    All other systems reviewed and are negative.       Physical Exam  Temp:  [36.1 °C (97 °F)-36.6 °C (97.9 °F)] 36.1 °C (97 °F)  Pulse:  [62-80] 76  Resp:  [16-18] 16  BP: (118-136)/(71-84) 118/78  SpO2:  [95 %-97 %] 96 %    Physical Exam  Vitals signs reviewed.   Constitutional:       General: He is not in acute distress.     Appearance: He is not diaphoretic.      Interventions: Nasal cannula in place.   HENT:      Head: Normocephalic. No contusion.      Mouth/Throat:      Mouth: Mucous membranes are moist.      Pharynx: No oropharyngeal exudate.   Eyes:      General:         Right eye: No discharge.         Left eye: No discharge.      Extraocular Movements: Extraocular movements intact.   Neck:      Musculoskeletal: Normal range of motion. No neck rigidity.   Cardiovascular:      Rate and Rhythm: Normal rate and regular rhythm.      Pulses: Normal pulses.   Pulmonary:      Effort: Pulmonary effort is normal. No respiratory distress.   Abdominal:      General: There is no distension.      Palpations: Abdomen is soft.      Tenderness: There is no abdominal tenderness. There is no guarding.   Genitourinary:     Comments: Tenderness on scrotal area. Skin graft in place with minimal drainage  Musculoskeletal:         General: No tenderness.      Right lower leg: No edema.      Left lower leg: No edema.   Skin:     General: Skin is dry.      Coloration: Skin is not jaundiced.      Comments: Right thigh with dressing in place, dried drainage   Neurological:      General: No focal deficit present.      Mental Status: He is alert.      Cranial Nerves: No cranial nerve deficit.      Sensory: No sensory deficit.   Psychiatric:         Mood and Affect: Mood normal.    "      Behavior: Behavior is cooperative.     Patient seen and evaluated today on 8/29, unchanged from 8/28.     Fluids    Intake/Output Summary (Last 24 hours) at 8/29/2020 0720  Last data filed at 8/29/2020 0400  Gross per 24 hour   Intake 1080 ml   Output 1900 ml   Net -820 ml       Laboratory  Recent Labs     08/27/20  0707 08/28/20  0505 08/29/20  0626   WBC 5.2 3.4* 4.1*   RBC 4.45* 4.48* 4.77   HEMOGLOBIN 11.3* 11.3* 12.1*   HEMATOCRIT 37.1* 37.3* 38.2*   MCV 83.4 83.3 80.1*   MCH 25.4* 25.2* 25.4*   MCHC 30.5* 30.3* 31.7*   RDW 58.3* 57.7* 56.1*   PLATELETCT 397 366 309   MPV 11.5 12.2 11.1     Recent Labs     08/28/20  0505   SODIUM 138   POTASSIUM 3.7   CHLORIDE 102   CO2 24   GLUCOSE 120*   BUN 17   CREATININE 0.70   CALCIUM 9.2                   Imaging  No orders to display        Assessment/Plan  * 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 ID evaluated on last hospitalization  - s/p 2 week course of abx s/p multiple washouts  - s/p graft placement on 8/23  - wound vac removed 8/27  - pain control  - continue gabapentin  - declining SNF; referral for  sent  - given increased pelvic pain, will obtain CT to evaluate further    Hypokalemia- (present on admission)  Assessment & Plan  Improving. Mild at 3.5. In the setting of hypomag.  - monitor and replete K and Mg    Type 2 diabetes mellitus, with long-term current use of insulin (HCC)- (present on admission)  Assessment & Plan  With hyperglycemia. A1C uncontrolled at 8.6%. Takes insulin at home.  - reduce to moderate sliding scale  - DM diet and hypoglycemia protocol    Chronic systolic heart failure (HCC)- (present on admission)  Assessment & Plan  EF 30%. No acute exacerbation and he is euvolemic.   - cardiology evaluated, appreciate recs  - monitor clinically  - there was mention of possible ICD placement outpatient    Paroxysmal A-fib (HCC)- (present on admission)  Assessment & Plan  Rate  controlled. No longer requiring midodrine for BP.  - continue home eliquis    Urinary retention  Assessment & Plan  Successful voiding trial 8/28  - bladder scan PRN  - monitor clinically; reported a clot vs hematuria x1 episode     History of TIA (transient ischemic attack)- (present on admission)  Assessment & Plan  - Continue aspirin    SLE (systemic lupus erythematosus related syndrome) (HCC)- (present on admission)  Assessment & Plan  - Holding steroid in setting of acute infection; last dose was July 31. No signs of adrenal insufficiency.  - Continue azathioprine  - restart naproxen BID PRN    Polycythemia vera (HCC)- (present on admission)  Assessment & Plan  Improving. Plt improving from 786 --> 434 --> 397--> 309 today.  - Continue hydroxyurea, dose lowered   - will resume home regimen on discharge  - outpatient follow up with heme/onc    Stenosis of right carotid artery-Right CEA 3/21/18- (present on admission)  Assessment & Plan  - Continue aspirin      VTE prophylaxis: home eliquis for afib

## 2020-08-30 PROBLEM — N20.0 NEPHROLITHIASIS: Status: ACTIVE | Noted: 2020-08-30

## 2020-08-30 LAB
ANION GAP SERPL CALC-SCNC: 9 MMOL/L (ref 7–16)
ANISOCYTOSIS BLD QL SMEAR: ABNORMAL
BASOPHILS # BLD AUTO: 4.3 % (ref 0–1.8)
BASOPHILS # BLD: 0.12 K/UL (ref 0–0.12)
BUN SERPL-MCNC: 16 MG/DL (ref 8–22)
CALCIUM SERPL-MCNC: 9.3 MG/DL (ref 8.5–10.5)
CHLORIDE SERPL-SCNC: 104 MMOL/L (ref 96–112)
CO2 SERPL-SCNC: 27 MMOL/L (ref 20–33)
CREAT SERPL-MCNC: 0.78 MG/DL (ref 0.5–1.4)
EOSINOPHIL # BLD AUTO: 0.05 K/UL (ref 0–0.51)
EOSINOPHIL NFR BLD: 1.7 % (ref 0–6.9)
ERYTHROCYTE [DISTWIDTH] IN BLOOD BY AUTOMATED COUNT: 59.4 FL (ref 35.9–50)
GLUCOSE BLD-MCNC: 142 MG/DL (ref 65–99)
GLUCOSE BLD-MCNC: 152 MG/DL (ref 65–99)
GLUCOSE BLD-MCNC: 155 MG/DL (ref 65–99)
GLUCOSE SERPL-MCNC: 139 MG/DL (ref 65–99)
HCT VFR BLD AUTO: 37.6 % (ref 42–52)
HGB BLD-MCNC: 11.5 G/DL (ref 14–18)
HYPOCHROMIA BLD QL SMEAR: ABNORMAL
LYMPHOCYTES # BLD AUTO: 0.59 K/UL (ref 1–4.8)
LYMPHOCYTES NFR BLD: 20.5 % (ref 22–41)
MACROCYTES BLD QL SMEAR: ABNORMAL
MAGNESIUM SERPL-MCNC: 2 MG/DL (ref 1.5–2.5)
MANUAL DIFF BLD: NORMAL
MCH RBC QN AUTO: 25.4 PG (ref 27–33)
MCHC RBC AUTO-ENTMCNC: 30.6 G/DL (ref 33.7–35.3)
MCV RBC AUTO: 83 FL (ref 81.4–97.8)
MONOCYTES # BLD AUTO: 0.12 K/UL (ref 0–0.85)
MONOCYTES NFR BLD AUTO: 4.3 % (ref 0–13.4)
MORPHOLOGY BLD-IMP: NORMAL
NEUTROPHILS # BLD AUTO: 2.01 K/UL (ref 1.82–7.42)
NEUTROPHILS NFR BLD: 69.2 % (ref 44–72)
NRBC # BLD AUTO: 0 K/UL
NRBC BLD-RTO: 0 /100 WBC
OVALOCYTES BLD QL SMEAR: NORMAL
PLATELET # BLD AUTO: 225 K/UL (ref 164–446)
PLATELET BLD QL SMEAR: NORMAL
PMV BLD AUTO: 10.3 FL (ref 9–12.9)
POIKILOCYTOSIS BLD QL SMEAR: NORMAL
POLYCHROMASIA BLD QL SMEAR: NORMAL
POTASSIUM SERPL-SCNC: 3.8 MMOL/L (ref 3.6–5.5)
RBC # BLD AUTO: 4.53 M/UL (ref 4.7–6.1)
RBC BLD AUTO: PRESENT
SCHISTOCYTES BLD QL SMEAR: NORMAL
SODIUM SERPL-SCNC: 140 MMOL/L (ref 135–145)
TOXIC GRANULES BLD QL SMEAR: SLIGHT
WBC # BLD AUTO: 2.9 K/UL (ref 4.8–10.8)

## 2020-08-30 PROCEDURE — 36415 COLL VENOUS BLD VENIPUNCTURE: CPT

## 2020-08-30 PROCEDURE — 700111 HCHG RX REV CODE 636 W/ 250 OVERRIDE (IP): Performed by: NURSE PRACTITIONER

## 2020-08-30 PROCEDURE — 700102 HCHG RX REV CODE 250 W/ 637 OVERRIDE(OP): Performed by: INTERNAL MEDICINE

## 2020-08-30 PROCEDURE — 83735 ASSAY OF MAGNESIUM: CPT

## 2020-08-30 PROCEDURE — A9270 NON-COVERED ITEM OR SERVICE: HCPCS | Performed by: NURSE PRACTITIONER

## 2020-08-30 PROCEDURE — A9270 NON-COVERED ITEM OR SERVICE: HCPCS | Performed by: INTERNAL MEDICINE

## 2020-08-30 PROCEDURE — 85027 COMPLETE CBC AUTOMATED: CPT

## 2020-08-30 PROCEDURE — 85007 BL SMEAR W/DIFF WBC COUNT: CPT

## 2020-08-30 PROCEDURE — 700102 HCHG RX REV CODE 250 W/ 637 OVERRIDE(OP): Performed by: NURSE PRACTITIONER

## 2020-08-30 PROCEDURE — 770001 HCHG ROOM/CARE - MED/SURG/GYN PRIV*

## 2020-08-30 PROCEDURE — 82962 GLUCOSE BLOOD TEST: CPT | Mod: 91

## 2020-08-30 PROCEDURE — A9270 NON-COVERED ITEM OR SERVICE: HCPCS | Performed by: HOSPITALIST

## 2020-08-30 PROCEDURE — 80048 BASIC METABOLIC PNL TOTAL CA: CPT

## 2020-08-30 PROCEDURE — 99232 SBSQ HOSP IP/OBS MODERATE 35: CPT | Performed by: HOSPITALIST

## 2020-08-30 PROCEDURE — 700102 HCHG RX REV CODE 250 W/ 637 OVERRIDE(OP): Performed by: HOSPITALIST

## 2020-08-30 RX ORDER — AMOXICILLIN AND CLAVULANATE POTASSIUM 875; 125 MG/1; MG/1
1 TABLET, FILM COATED ORAL EVERY 12 HOURS
Status: DISCONTINUED | OUTPATIENT
Start: 2020-08-30 | End: 2020-08-31 | Stop reason: HOSPADM

## 2020-08-30 RX ORDER — FLUCONAZOLE 200 MG/1
200 TABLET ORAL DAILY
Status: DISCONTINUED | OUTPATIENT
Start: 2020-08-30 | End: 2020-08-31 | Stop reason: HOSPADM

## 2020-08-30 RX ADMIN — INSULIN HUMAN 2 UNITS: 100 INJECTION, SOLUTION PARENTERAL at 05:08

## 2020-08-30 RX ADMIN — APIXABAN 5 MG: 5 TABLET, FILM COATED ORAL at 16:58

## 2020-08-30 RX ADMIN — MORPHINE SULFATE 15 MG: 15 TABLET, FILM COATED, EXTENDED RELEASE ORAL at 20:14

## 2020-08-30 RX ADMIN — AMOXICILLIN AND CLAVULANATE POTASSIUM 1 TABLET: 875; 125 TABLET, FILM COATED ORAL at 16:58

## 2020-08-30 RX ADMIN — APIXABAN 5 MG: 5 TABLET, FILM COATED ORAL at 09:17

## 2020-08-30 RX ADMIN — FLUCONAZOLE 200 MG: 200 TABLET ORAL at 11:58

## 2020-08-30 RX ADMIN — INSULIN HUMAN 2 UNITS: 100 INJECTION, SOLUTION PARENTERAL at 17:00

## 2020-08-30 RX ADMIN — THERA TABS 1 TABLET: TAB at 09:16

## 2020-08-30 RX ADMIN — GABAPENTIN 600 MG: 300 CAPSULE ORAL at 09:16

## 2020-08-30 RX ADMIN — ASPIRIN 81 MG: 81 TABLET, COATED ORAL at 09:16

## 2020-08-30 RX ADMIN — AZATHIOPRINE 50 MG: 50 TABLET ORAL at 09:18

## 2020-08-30 RX ADMIN — MORPHINE SULFATE 15 MG: 15 TABLET, FILM COATED, EXTENDED RELEASE ORAL at 09:16

## 2020-08-30 RX ADMIN — OMEPRAZOLE 20 MG: 20 CAPSULE, DELAYED RELEASE ORAL at 09:17

## 2020-08-30 RX ADMIN — AMOXICILLIN AND CLAVULANATE POTASSIUM 1 TABLET: 875; 125 TABLET, FILM COATED ORAL at 11:58

## 2020-08-30 RX ADMIN — GABAPENTIN 600 MG: 300 CAPSULE ORAL at 13:18

## 2020-08-30 RX ADMIN — GABAPENTIN 600 MG: 300 CAPSULE ORAL at 20:14

## 2020-08-30 RX ADMIN — INSULIN HUMAN 2 UNITS: 100 INJECTION, SOLUTION PARENTERAL at 20:19

## 2020-08-30 RX ADMIN — NAPROXEN 750 MG: 250 TABLET ORAL at 13:18

## 2020-08-30 RX ADMIN — OXYCODONE HYDROCHLORIDE 10 MG: 10 TABLET ORAL at 13:18

## 2020-08-30 RX ADMIN — OXYCODONE HYDROCHLORIDE 10 MG: 10 TABLET ORAL at 21:35

## 2020-08-30 RX ADMIN — DOCUSATE SODIUM 50 MG AND SENNOSIDES 8.6 MG 2 TABLET: 8.6; 5 TABLET, FILM COATED ORAL at 21:36

## 2020-08-30 RX ADMIN — AZATHIOPRINE 50 MG: 50 TABLET ORAL at 20:14

## 2020-08-30 ASSESSMENT — ENCOUNTER SYMPTOMS
HEMOPTYSIS: 0
FLANK PAIN: 0
PALPITATIONS: 0
HEARTBURN: 0
COUGH: 0
WEAKNESS: 1
EYE REDNESS: 0
SINUS PAIN: 0
DIAPHORESIS: 0
SORE THROAT: 0
SPUTUM PRODUCTION: 0
FEVER: 0
SPEECH CHANGE: 0
DIARRHEA: 0
ABDOMINAL PAIN: 0
WEIGHT LOSS: 0
SHORTNESS OF BREATH: 0
MYALGIAS: 1
DIZZINESS: 0
HEADACHES: 0
NERVOUS/ANXIOUS: 0
CHILLS: 0
HALLUCINATIONS: 0
EYE PAIN: 0
NECK PAIN: 0
CONSTIPATION: 0
ABDOMINAL PAIN: 1
VOMITING: 0
NAUSEA: 0
EYE DISCHARGE: 0
FOCAL WEAKNESS: 0

## 2020-08-30 NOTE — PROGRESS NOTES
Bedside report received. Assessment completed.  Pt is A&O x4. Pt on room air.   Pain 3/10.  Denies nausea/vomiting   - numbness, - tingling.  Right thigh skin graft donor site with dressing in place.   Scrotum with dressing in place, CDI. Daily dressing changes per order.   Last BM 8/28. +flatus, + void  Diabetic diet. Tolerates well.   Pt up SBA with FWW. Tolerates well.   Call light within reach. All needs met at this time. Fall Precautions and hourly rounding in place.

## 2020-08-30 NOTE — PROGRESS NOTES
"Hospital Medicine Daily Progress Note    Date of Service  8/30/2020    Chief Complaint  70 y.o. male admitted 8/22/2020 with scrotal pain    Hospital Course    *70-year-old male with past medical history of polycythemia, lupus, CAD, TIA, chronic systolic heart failure, PAF presented to the hospital on July 25, 2020 with a scrotal pain and swelling due to right scrotal abscess and Landon's gangrene.  Urology was emergently consulted and he underwent washout on July 25 with 3 more subsequent washout on July 29, July 31 and August 3 and wound VAC was placed.  Culture showed Abigail albicans and infectious disease consulted and he completed the course of Unasyn and fluconazole on August 17, 2020.  Patient transferred to [facility] from [facility] for plastic surgery consult.*      Interval Problem Update  Wound vac off and go out. He feels much improved today. CT showed small about of gas in right gregory-scrotum. Discussed with Urology, plan to continue abx and monitor. No further hematuria.     Consultants/Specialty  Urology  Infectious disease  Plastic surgery    Code Status  Full Code    Disposition  Anticipate home tomorrow with HH (accepted).     Review of Systems  Review of Systems   Constitutional: Positive for malaise/fatigue (improved today). Negative for chills, diaphoresis and fever.   HENT: Negative for congestion and sinus pain.    Eyes: Negative for discharge.   Respiratory: Negative for cough, sputum production and shortness of breath.    Cardiovascular: Negative for chest pain, palpitations and leg swelling.   Gastrointestinal: Negative for abdominal pain (pelvic pain, improving), nausea and vomiting.   Genitourinary: Negative for dysuria, flank pain and hematuria (resolved).        Scrotal pain, improving   Musculoskeletal: Positive for myalgias (at skin graft site when mobilizing). Negative for joint pain and neck pain.   Skin: Positive for rash (\"road rash\" right thigh improving). "   Neurological: Positive for weakness (improving). Negative for dizziness, speech change, focal weakness and headaches.   Psychiatric/Behavioral: Negative for hallucinations. The patient is not nervous/anxious.    All other systems reviewed and are negative.       Physical Exam  Temp:  [36.3 °C (97.3 °F)-37.2 °C (98.9 °F)] 37.2 °C (98.9 °F)  Pulse:  [60-65] 60  Resp:  [15-16] 15  BP: (107-132)/(70-86) 107/70  SpO2:  [90 %-99 %] 90 %    Physical Exam  Vitals signs reviewed.   Constitutional:       General: He is not in acute distress.     Appearance: He is not diaphoretic.      Interventions: Nasal cannula in place.   HENT:      Head: Normocephalic. No contusion.      Mouth/Throat:      Mouth: Mucous membranes are moist.      Pharynx: Oropharynx is clear.   Eyes:      General: No scleral icterus.        Right eye: No discharge.         Left eye: No discharge.      Extraocular Movements: Extraocular movements intact.   Neck:      Musculoskeletal: Normal range of motion. No neck rigidity.   Cardiovascular:      Rate and Rhythm: Normal rate and regular rhythm.      Pulses: Normal pulses.   Pulmonary:      Effort: Pulmonary effort is normal. No respiratory distress.   Abdominal:      General: There is no distension.      Palpations: Abdomen is soft.      Tenderness: There is no abdominal tenderness. There is no guarding.   Genitourinary:     Comments: Tenderness on scrotal area. Skin graft in place with minimal drainage  Musculoskeletal:         General: No tenderness.      Right lower leg: No edema.      Left lower leg: No edema.   Skin:     General: Skin is dry.      Coloration: Skin is not jaundiced or pale.      Comments: Right thigh with dressing in place, dried drainage; healing well   Neurological:      General: No focal deficit present.      Mental Status: He is alert.      Cranial Nerves: No cranial nerve deficit.      Sensory: No sensory deficit.   Psychiatric:         Mood and Affect: Mood normal.          Behavior: Behavior is cooperative.         Fluids    Intake/Output Summary (Last 24 hours) at 8/30/2020 0747  Last data filed at 8/30/2020 0355  Gross per 24 hour   Intake 1320 ml   Output --   Net 1320 ml       Laboratory  Recent Labs     08/28/20  0505 08/29/20  0626 08/30/20  0604   WBC 3.4* 4.1* 2.9*   RBC 4.48* 4.77 4.53*   HEMOGLOBIN 11.3* 12.1* 11.5*   HEMATOCRIT 37.3* 38.2* 37.6*   MCV 83.3 80.1* 83.0   MCH 25.2* 25.4* 25.4*   MCHC 30.3* 31.7* 30.6*   RDW 57.7* 56.1* 59.4*   PLATELETCT 366 309 225   MPV 12.2 11.1 10.3     Recent Labs     08/28/20  0505 08/30/20  0604   SODIUM 138 140   POTASSIUM 3.7 3.8   CHLORIDE 102 104   CO2 24 27   GLUCOSE 120* 139*   BUN 17 16   CREATININE 0.70 0.78   CALCIUM 9.2 9.3                   Imaging  CT-ABDOMEN-PELVIS WITH   Final Result      1.  Small gas identified in the right hemiscrotum posteriorly. This is concerning for Landon's gangrene. The appearance however is markedly improved from just over one month ago      2.  Otherwise stable evaluation with evidence of mild retroperitoneal adenopathy which most likely is reactive.      3.  Prostate enlargement with bladder wall thickening likely from chronic outlet obstruction. Recommend clinical correlation      4.  Mild splenomegaly      5.  Nonobstructive 5 mm nephrolith      6.  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 ID evaluated on last hospitalization  - s/p 2 week course of abx s/p multiple washouts  - s/p graft placement on 8/23  - wound vac removed 8/27  - pain control  - continue gabapentin  - resume Augmentin and fluconazole based on cultures from last admission    CT abd/pelvis:  Small gas identified in the right hemiscrotum posteriorly. This is concerning for Landon's gangrene. The appearance however is markedly improved from just over  one month ago    Hypokalemia- (present on admission)  Assessment & Plan  Improving. In the setting of hypomag.  - monitor and replete K and Mg    Type 2 diabetes mellitus, with long-term current use of insulin ([facility])- (present on admission)  Assessment & Plan  With hyperglycemia. A1C uncontrolled at 8.6%. Takes insulin at home.  - reduce to moderate sliding scale  - DM diet and hypoglycemia protocol    Chronic systolic heart failure ([facility])- (present on admission)  Assessment & Plan  EF 30%. No acute exacerbation and he is euvolemic. Stable at this time  - cardiology evaluated, appreciate recs  - monitor clinically  - outpatient follow up  - there was mention of possible ICD placement outpatient    Paroxysmal A-fib ([facility])- (present on admission)  Assessment & Plan  Rate controlled. No longer requiring midodrine for BP.  - continue home eliquis    Nephrolithiasis  Assessment & Plan  Incidental finding of a nonobstructive 5 mm nephrolith. Asymptomatic. Reports he has a history of kidney stones    Urinary retention  Assessment & Plan  Successful voiding trial 8/28  - bladder scan PRN  - monitor clinically; reported a clot vs hematuria x1 episode     History of TIA (transient ischemic attack)- (present on admission)  Assessment & Plan  - Continue aspirin    SLE (systemic lupus erythematosus related syndrome) ([facility])- (present on admission)  Assessment & Plan  - Holding steroid in setting of acute infection; last dose was July 31.  - Continue azathioprine  - restarted naproxen BID PRN flare    Polycythemia vera ([facility])- (present on admission)  Assessment & Plan  Improving. Plt improving from 786 --> 434 --> 397--> 309--> 225 today.  - Continue hydroxyurea, dose lowered   - will resume home regimen on discharge  - outpatient follow up with heme/onc    Stenosis of right carotid artery-Right CEA 3/21/18- (present on admission)  Assessment & Plan  - Continue aspirin      VTE prophylaxis: home eliquis for afib

## 2020-08-30 NOTE — PROGRESS NOTES
Equipment check: all bolts checked and tightened. Frame is sturdy and pt has no complaints at this time. Please call traction for assistance.

## 2020-08-30 NOTE — PROGRESS NOTES
Note to reader: this note follows the APSO format rather than the historical SOAP format. Assessment and plan located at the top of the note for ease of use.    Chief Complaint  70 y.o. year old male here with No chief complaint on file.      Assessment/Plan  Interval History   Active Hospital Problems    Diagnosis   • Landon's gangrene of scrotum [N49.3]     Priority: High   • Hypokalemia [E87.6]     Priority: Medium   • Type 2 diabetes mellitus, with long-term current use of insulin ([facility]) [E11.9, Z79.4]     Priority: Medium     Chronic condition treated with Glipizide and Lantus.  Sliding scale insulin during acute hospitalization.       • Chronic systolic heart failure ([facility]) [I50.22]     Priority: Medium   • Paroxysmal A-fib ([facility]) [I48.0]     Priority: Medium   • Stenosis of right carotid artery-Right CEA 3/21/18 [I65.21]     Priority: Low     90% right carotid stenosis  Right CEA 3/21  Benny Morfin MD - vascular surgery     • Urinary retention [R33.9]   • History of TIA (transient ischemic attack) [Z86.73]   • SLE (systemic lupus erythematosus related syndrome) ([facility]) [M32.9]   • Polycythemia vera ([facility]) [D45]      patient seen and examined.     8/30. Pt notices worsening of R groin pain with coughing. No overnight events. Anxious for discharge. Afebrile. Pain controlled.     Disposition  Stable.   PLAN:  No acute  interventions indicated.   Manage pain.   ID still to see, appreciate their input regarding abx.   Urology signing off.       Review of Systems  Physical Exam   Review of Systems   Constitutional: Negative for chills and fever.   Genitourinary: Negative for dysuria, flank pain, frequency, hematuria and urgency.        + right groin pain     Vitals:    08/29/20 1610 08/29/20 2057 08/30/20 0355 08/30/20 0845   BP: 132/86 112/74 107/70 116/82   Pulse: 65 63 60 80   Resp: 16 16 15 18   Temp: 36.5 °C (97.7 °F) 36.3 °C (97.3 °F) 37.2 °C (98.9 °F) 36.7 °C (98 °F)   TempSrc: Temporal Temporal Temporal  Temporal   SpO2: 99% 95% 90% 95%   Weight:       Height:         Physical Exam   Constitutional: He is oriented to person, place, and time and well-developed, well-nourished, and in no distress.   HENT:   Head: Normocephalic and atraumatic.   Eyes: Pupils are equal, round, and reactive to light.   Neck: Normal range of motion.   Pulmonary/Chest: Effort normal.   Abdominal: Soft.   Genitourinary:    Genitourinary Comments: Skin graft in place to right hemiscrotum. No bloody discharge. TTP. No erythema.      Neurological: He is alert and oriented to person, place, and time.   Psychiatric: Affect and judgment normal.   Nursing note and vitals reviewed.       Hematology Chemistry   Lab Results   Component Value Date/Time    WBC 2.9 (L) 08/30/2020 06:04 AM    HEMOGLOBIN 11.5 (L) 08/30/2020 06:04 AM    HEMATOCRIT 37.6 (L) 08/30/2020 06:04 AM    PLATELETCT 225 08/30/2020 06:04 AM     Lab Results   Component Value Date/Time    SODIUM 140 08/30/2020 06:04 AM    POTASSIUM 3.8 08/30/2020 06:04 AM    CHLORIDE 104 08/30/2020 06:04 AM    CO2 27 08/30/2020 06:04 AM    GLUCOSE 139 (H) 08/30/2020 06:04 AM    BUN 16 08/30/2020 06:04 AM    CREATININE 0.78 08/30/2020 06:04 AM         Labs not explicitly included in this progress note were reviewed by the author.   Radiology/imaging not explicitly included in this progress note was reviewed by the author.     Core Measures

## 2020-08-30 NOTE — PROGRESS NOTES
Oncology/Hematology Progress Note               Author: Jigar Blevins M.D.    Cc: Myeloproliferative disorder         Anthony worthington Date & Time created: 8/30/2020  2:54 PM     Interval History:  He was hoping to discharge yesterday, but because of the slight abdominal pain he underwent a CT scan of the abdomen and pelvis.  This showed some slight gas formation in the right hemiscrotum area.  There was some worry for possible recurrence of infection, and he was started on Augmentin and fluconazole oral antibiotics.  His doctors do feel he could go home tomorrow however.  He is otherwise feeling good.        PMHx, PSurgHx, SocHX, FAMHx:   All reviewed and no changes    Review of Systems:  Review of Systems   Constitutional: Negative for chills, fever and weight loss.   HENT: Negative for congestion, nosebleeds, sinus pain and sore throat.    Eyes: Negative for pain, discharge and redness.   Respiratory: Negative for cough, hemoptysis, sputum production and shortness of breath.    Cardiovascular: Negative for chest pain, palpitations and leg swelling.   Gastrointestinal: Positive for abdominal pain. Negative for constipation, diarrhea, heartburn, nausea and vomiting.        Slight abdominal pain in the right lower quadrant the abdomen off and on.   Genitourinary: Negative for dysuria, frequency and urgency.   Skin: Negative for itching and rash.       Physical Exam:  Physical Exam  Vitals signs reviewed.   Constitutional:       General: He is not in acute distress.     Appearance: Normal appearance. He is not toxic-appearing.   HENT:      Head: Normocephalic and atraumatic.      Mouth/Throat:      Mouth: Mucous membranes are moist.      Pharynx: Oropharynx is clear. No oropharyngeal exudate.   Eyes:      General: No scleral icterus.     Extraocular Movements: Extraocular movements intact.      Conjunctiva/sclera: Conjunctivae normal.   Neck:      Musculoskeletal: Normal range of motion and neck supple. No  muscular tenderness.   Cardiovascular:      Rate and Rhythm: Normal rate and regular rhythm.      Heart sounds: No murmur. No gallop.    Pulmonary:      Effort: Pulmonary effort is normal. No respiratory distress.      Breath sounds: Normal breath sounds. No wheezing or rales.   Abdominal:      General: Abdomen is flat. Bowel sounds are normal. There is no distension.      Palpations: Abdomen is soft.      Tenderness: There is no abdominal tenderness. There is no guarding.   Musculoskeletal: Normal range of motion.         General: No swelling.      Right lower leg: No edema.      Left lower leg: No edema.   Skin:     General: Skin is warm and dry.      Findings: No erythema or rash.   Neurological:      General: No focal deficit present.      Mental Status: He is alert and oriented to person, place, and time. Mental status is at baseline.   Psychiatric:         Mood and Affect: Mood normal.         Judgment: Judgment normal.         Labs:          Recent Labs     20  0604   SODIUM 138 140   POTASSIUM 3.7 3.8   CHLORIDE 102 104   CO2 24 27   BUN 17 16   CREATININE 0.70 0.78   MAGNESIUM 1.8 2.0   CALCIUM 9.2 9.3     Recent Labs     20  0505 20  0604   GLUCOSE 120* 139*     Recent Labs     20  05020  0626 20  0604   RBC 4.48* 4.77 4.53*   HEMOGLOBIN 11.3* 12.1* 11.5*   HEMATOCRIT 37.3* 38.2* 37.6*   PLATELETCT 366 309 225     Recent Labs     20  0626 20  0604   WBC 3.4* 4.1* 2.9*   NEUTSPOLYS 72.30* 75.20* 69.20   LYMPHOCYTES 13.70* 15.40* 20.50*   MONOCYTES 7.30 0.90 4.30   EOSINOPHILS 4.10 4.30 1.70   BASOPHILS 2.00* 4.30* 4.30*     Recent Labs     20  05020  0604   SODIUM 138 140   POTASSIUM 3.7 3.8   CHLORIDE 102 104   CO2 24 27   GLUCOSE 120* 139*   BUN 17 16   CREATININE 0.70 0.78   CALCIUM 9.2 9.3     Hemodynamics:  Temp (24hrs), Av.7 °C (98 °F), Min:36.3 °C (97.3 °F), Max:37.2 °C (98.9 °F)  Temperature: 36.7 °C  (98 °F)  Pulse  Av.4  Min: 60  Max: 87   Blood Pressure : 116/82     Respiratory:    Respiration: 18, Pulse Oximetry: 95 %     Work Of Breathing / Effort: Mild  RUL Breath Sounds: Clear, RML Breath Sounds: Clear, RLL Breath Sounds: Diminished, EVELINA Breath Sounds: Clear, LLL Breath Sounds: Diminished  Fluids:    Intake/Output Summary (Last 24 hours) at 2020 1649  Last data filed at 2020 1600  Gross per 24 hour   Intake 1560 ml   Output 2000 ml   Net -440 ml        GI/Nutrition:  Orders Placed This Encounter   Procedures   • Diet Order Diabetic     Standing Status:   Standing     Number of Occurrences:   1     Order Specific Question:   Diet:     Answer:   Diabetic [3]     Medical Decision Making, by Problem:  Active Hospital Problems    Diagnosis   • *Landon's gangrene of scrotum [N49.3]   • Hypokalemia [E87.6]   • Type 2 diabetes mellitus, with long-term current use of insulin ([facility]) [E11.9, Z79.4]   • Chronic systolic heart failure ([facility]) [I50.22]   • Paroxysmal A-fib ([facility]) [I48.0]   • Stenosis of right carotid artery-Right CEA 3/21/18 [I65.21]   • Urinary retention [R33.9]   • History of TIA (transient ischemic attack) [Z86.73]   • SLE (systemic lupus erythematosus related syndrome) ([facility]) [M32.9]   • Polycythemia vera ([facility]) [D45]       Plan:  1.  Myeloproliferative disorder-- he has a history of a myeloproliferative disorder/polycythemia vera.  He follows with Dr. Sims in our office.  He is managed on Hydrea, as well as occasional phlebotomies.  As an outpatient he was on 1000 mg/day alternating with 500 mg/day.        His Hydrea was stopped initially in the setting of infection with his initial admission.  Subsequently his counts increased including a platelet count, which peaked at 942.  He was put back on his Hydrea at 2500 mg daily.  Gradually as his counts have come down, the Hydrea dose has been reduced.  About a week ago he was put on 1500 mg daily.  On  I drop the dose to 1000 mg  daily.    --I reviewed his blood counts today.  --His WBC is on the low end of normal at 3.4, with an ANC of 2.5.  His platelets are at goal at 360.  --I dropped his Hydrea to 500 mg dose on 8/29.  --His white count dropped to 2.9 with an ANC of 2.0, and platelet of 225.  --I am a little bit worried that his white blood cell count may continue to drop.    --I recommend holding the Hydrea for tonight.    --If he discharges tomorrow, I would put him on  Hydrea 500 mg/day.    --He would then follow-up with Dr. Sims next week on Wednesday in the office for repeat CBC.      2.  Landon's gangrene--he has had a complex hospitalization related to this infection.  His wound grew out Candida albicans.  He did a 2-week course of antibiotics including fluconazole as well as Unasyn finishing on 8/17/2020.         He had multiple I&D as well as washout surgeries including a surgery on 7/25, 7/29, 7/31, as well as wound VAC placement on 8/3.  Eventually had a skin graft closure procedure on 8/23.    --Continue to follow with urology as well as plastic surgery  --Seems to be recovering well from the infection.  --He is now on oral fluconazole and Augmentin.  The course will be dictated by the other medical team.            Quality-Core Measures   Reviewed items::  Labs reviewed, Medications reviewed and Radiology images reviewed

## 2020-08-30 NOTE — ASSESSMENT & PLAN NOTE
Incidental finding of a nonobstructive 5 mm nephrolith. Asymptomatic. Reports he has a history of kidney stones

## 2020-08-30 NOTE — CARE PLAN
Problem: Pain Management  Goal: Pain level will decrease to patient's comfort goal  Outcome: PROGRESSING AS EXPECTED  Note: Pain controlled on current regimen.     Problem: Mobility  Goal: Risk for activity intolerance will decrease  Outcome: PROGRESSING AS EXPECTED  Note: Pt ambulates in room and halls with staff present.

## 2020-08-30 NOTE — CARE PLAN
Problem: Infection  Goal: Will remain free from infection  Outcome: PROGRESSING AS EXPECTED  Note: ID consulted, daily & PRN dressing changes in place     Problem: Knowledge Deficit  Goal: Knowledge of disease process/condition, treatment plan, diagnostic tests, and medications will improve  Outcome: PROGRESSING AS EXPECTED  Note: Updated on POC, educated about medications, dressing changes, and labs

## 2020-08-30 NOTE — PROGRESS NOTES
Bedside report received. Assessment completed.  Pt is A&O x4. Pt on room air.   Pain 3/10.  Denies nausea.   - numbness, - tingling.  Right thigh skin graft donor site with dressing in place.   Scrotum with dressing in place, CDI. Daily dressing changes per order.   Last BM 8/30. +flatus, + void. Frequency and urgency with urination.  Diabetic diet. Tolerates well.   Pt up SBA with FWW. Tolerates well.   Call light within reach. All needs met at this time. Fall Precautions and hourly rounding in place.

## 2020-08-31 ENCOUNTER — PHARMACY VISIT (OUTPATIENT)
Dept: PHARMACY | Facility: MEDICAL CENTER | Age: 70
End: 2020-08-31
Payer: MEDICARE

## 2020-08-31 VITALS
BODY MASS INDEX: 22.48 KG/M2 | DIASTOLIC BLOOD PRESSURE: 82 MMHG | TEMPERATURE: 97 F | HEART RATE: 71 BPM | HEIGHT: 75 IN | WEIGHT: 180.78 LBS | SYSTOLIC BLOOD PRESSURE: 131 MMHG | RESPIRATION RATE: 16 BRPM | OXYGEN SATURATION: 94 %

## 2020-08-31 PROBLEM — N49.3 FOURNIER'S GANGRENE OF SCROTUM: Status: RESOLVED | Noted: 2020-07-25 | Resolved: 2020-08-31

## 2020-08-31 PROBLEM — R33.9 URINARY RETENTION: Status: RESOLVED | Noted: 2020-08-27 | Resolved: 2020-08-31

## 2020-08-31 PROBLEM — E87.6 HYPOKALEMIA: Status: RESOLVED | Noted: 2018-03-10 | Resolved: 2020-08-31

## 2020-08-31 LAB
BASOPHILS # BLD AUTO: 2.4 % (ref 0–1.8)
BASOPHILS # BLD: 0.08 K/UL (ref 0–0.12)
EOSINOPHIL # BLD AUTO: 0.18 K/UL (ref 0–0.51)
EOSINOPHIL NFR BLD: 5.4 % (ref 0–6.9)
ERYTHROCYTE [DISTWIDTH] IN BLOOD BY AUTOMATED COUNT: 61.5 FL (ref 35.9–50)
FUNGUS SPEC CULT: ABNORMAL
FUNGUS SPEC CULT: ABNORMAL
FUNGUS SPEC CULT: NORMAL
GLUCOSE BLD-MCNC: 107 MG/DL (ref 65–99)
GLUCOSE BLD-MCNC: 119 MG/DL (ref 65–99)
GLUCOSE BLD-MCNC: 159 MG/DL (ref 65–99)
HCT VFR BLD AUTO: 39.5 % (ref 42–52)
HGB BLD-MCNC: 11.7 G/DL (ref 14–18)
IMM GRANULOCYTES # BLD AUTO: 0.01 K/UL (ref 0–0.11)
IMM GRANULOCYTES NFR BLD AUTO: 0.3 % (ref 0–0.9)
LYMPHOCYTES # BLD AUTO: 0.48 K/UL (ref 1–4.8)
LYMPHOCYTES NFR BLD: 14.4 % (ref 22–41)
MCH RBC QN AUTO: 25.1 PG (ref 27–33)
MCHC RBC AUTO-ENTMCNC: 29.6 G/DL (ref 33.7–35.3)
MCV RBC AUTO: 84.6 FL (ref 81.4–97.8)
MONOCYTES # BLD AUTO: 0.4 K/UL (ref 0–0.85)
MONOCYTES NFR BLD AUTO: 12 % (ref 0–13.4)
NEUTROPHILS # BLD AUTO: 2.19 K/UL (ref 1.82–7.42)
NEUTROPHILS NFR BLD: 65.5 % (ref 44–72)
NRBC # BLD AUTO: 0 K/UL
NRBC BLD-RTO: 0 /100 WBC
PLATELET # BLD AUTO: 182 K/UL (ref 164–446)
RBC # BLD AUTO: 4.67 M/UL (ref 4.7–6.1)
SIGNIFICANT IND 70042: ABNORMAL
SIGNIFICANT IND 70042: NORMAL
SITE SITE: ABNORMAL
SITE SITE: NORMAL
SOURCE SOURCE: ABNORMAL
SOURCE SOURCE: NORMAL
WBC # BLD AUTO: 3.3 K/UL (ref 4.8–10.8)

## 2020-08-31 PROCEDURE — 700102 HCHG RX REV CODE 250 W/ 637 OVERRIDE(OP): Performed by: INTERNAL MEDICINE

## 2020-08-31 PROCEDURE — 82962 GLUCOSE BLOOD TEST: CPT | Mod: 91

## 2020-08-31 PROCEDURE — 85025 COMPLETE CBC W/AUTO DIFF WBC: CPT

## 2020-08-31 PROCEDURE — 36415 COLL VENOUS BLD VENIPUNCTURE: CPT

## 2020-08-31 PROCEDURE — 700102 HCHG RX REV CODE 250 W/ 637 OVERRIDE(OP): Performed by: NURSE PRACTITIONER

## 2020-08-31 PROCEDURE — 700102 HCHG RX REV CODE 250 W/ 637 OVERRIDE(OP): Performed by: HOSPITALIST

## 2020-08-31 PROCEDURE — A9270 NON-COVERED ITEM OR SERVICE: HCPCS | Performed by: HOSPITALIST

## 2020-08-31 PROCEDURE — 700111 HCHG RX REV CODE 636 W/ 250 OVERRIDE (IP): Performed by: NURSE PRACTITIONER

## 2020-08-31 PROCEDURE — A9270 NON-COVERED ITEM OR SERVICE: HCPCS | Performed by: NURSE PRACTITIONER

## 2020-08-31 PROCEDURE — RXMED WILLOW AMBULATORY MEDICATION CHARGE: Performed by: HOSPITALIST

## 2020-08-31 PROCEDURE — A9270 NON-COVERED ITEM OR SERVICE: HCPCS | Performed by: INTERNAL MEDICINE

## 2020-08-31 PROCEDURE — 99239 HOSP IP/OBS DSCHRG MGMT >30: CPT | Performed by: HOSPITALIST

## 2020-08-31 RX ORDER — OXYCODONE HYDROCHLORIDE 10 MG/1
10 TABLET ORAL EVERY 6 HOURS PRN
Qty: 7 TAB | Refills: 0 | Status: SHIPPED | OUTPATIENT
Start: 2020-08-31 | End: 2020-09-05

## 2020-08-31 RX ORDER — GABAPENTIN 300 MG/1
600 CAPSULE ORAL 3 TIMES DAILY
Qty: 90 CAP | Refills: 0 | Status: SHIPPED | OUTPATIENT
Start: 2020-08-31 | End: 2020-11-06

## 2020-08-31 RX ORDER — HYDROXYUREA 500 MG/1
500 CAPSULE ORAL DAILY
Qty: 30 CAP | Refills: 0 | Status: SHIPPED | OUTPATIENT
Start: 2020-08-31 | End: 2020-09-30

## 2020-08-31 RX ORDER — FLUCONAZOLE 200 MG/1
200 TABLET ORAL DAILY
Qty: 8 TAB | Refills: 0 | Status: SHIPPED | OUTPATIENT
Start: 2020-09-01 | End: 2020-09-09

## 2020-08-31 RX ORDER — AMOXICILLIN AND CLAVULANATE POTASSIUM 875; 125 MG/1; MG/1
1 TABLET, FILM COATED ORAL EVERY 12 HOURS
Qty: 16 TAB | Refills: 0 | Status: SHIPPED | OUTPATIENT
Start: 2020-08-31 | End: 2020-09-08

## 2020-08-31 RX ADMIN — AZATHIOPRINE 50 MG: 50 TABLET ORAL at 08:28

## 2020-08-31 RX ADMIN — OXYCODONE HYDROCHLORIDE 10 MG: 10 TABLET ORAL at 08:43

## 2020-08-31 RX ADMIN — FLUCONAZOLE 200 MG: 200 TABLET ORAL at 05:45

## 2020-08-31 RX ADMIN — GABAPENTIN 600 MG: 300 CAPSULE ORAL at 08:27

## 2020-08-31 RX ADMIN — MORPHINE SULFATE 15 MG: 15 TABLET, FILM COATED, EXTENDED RELEASE ORAL at 08:27

## 2020-08-31 RX ADMIN — AMOXICILLIN AND CLAVULANATE POTASSIUM 1 TABLET: 875; 125 TABLET, FILM COATED ORAL at 05:45

## 2020-08-31 RX ADMIN — NAPROXEN 750 MG: 250 TABLET ORAL at 08:43

## 2020-08-31 RX ADMIN — APIXABAN 5 MG: 5 TABLET, FILM COATED ORAL at 08:27

## 2020-08-31 RX ADMIN — OMEPRAZOLE 20 MG: 20 CAPSULE, DELAYED RELEASE ORAL at 08:27

## 2020-08-31 RX ADMIN — THERA TABS 1 TABLET: TAB at 08:27

## 2020-08-31 RX ADMIN — ASPIRIN 81 MG: 81 TABLET, COATED ORAL at 08:27

## 2020-08-31 NOTE — PROGRESS NOTES
Bedside report received. Assessment completed.  Pt is A&O x4. Pt on room air.   Pain 1/10.  Denies nausea/vomiting   - numbness, - tingling.  Right thigh skin graft donor site with dressing in place.   Scrotum with dressing in place, CDI. Daily dressing changes per order.   Last BM 8/29. +flatus, + void  Diabetic diet. Tolerates well.   Pt up SBA with FWW. Tolerates well.   Call light within reach. All needs met at this time. Fall Precautions and hourly rounding in place.

## 2020-08-31 NOTE — DISCHARGE INSTRUCTIONS
Discharge Instructions    Discharged to home by car with relative. Discharged via wheelchair, hospital escort: Yes.  Special equipment needed: Not Applicable    Be sure to schedule a follow-up appointment with your primary care doctor or any specialists as instructed.     Discharge Plan:        I understand that a diet low in cholesterol, fat, and sodium is recommended for good health. Unless I have been given specific instructions below for another diet, I accept this instruction as my diet prescription.   Other diet: Diabetic Diet    Special Instructions: None    · Is patient discharged on Warfarin / Coumadin?   No   Gangrene  Gangrene is a medical condition that is caused by a lack of blood supply to an area of your body. Oxygen travels through blood, so less blood means less oxygen. Without oxygen, body tissue will start to die.  Gangrene can affect many parts of the body. It is most common on the skin (external gangrene) and on your arms and legs, but it can also affect internal body parts (internal gangrene). Gangrene requires emergency medical treatment.  What are the causes?  Any condition that cuts off blood supply can cause gangrene. Common causes include:  · Injuries.  · Blood vessel diseases.  · Diabetes.  · Infections.  What increases the risk?  You may be at increased risk for gangrene if you have recently had surgery, or if you have:  · A serious injury.  · Diabetes.  · A weak disease-fighting system (immune system).  · Narrowing of your arteries due to plaque buildup (arteriosclerosis).  · An immune system disease that causes your arteries to tighten (Raynaud's disease).  · A history of IV drug use.  What are the signs or symptoms?  Symptoms depend on which part of your body is affected. If you have external gangrene, you may have:  · Severe pain, followed by loss of feeling.  · Redness and swelling.  · Crackling sounds when you press on a swollen area of skin.  · A wound with bad-smelling  drainage.  · Changes in the color of your skin. It may turn red, blue, black, or white.  · Fever and chills.  If you have internal gangrene, you may have:  · Fever and chills.  · Confusion.  · Dizziness.  · Severe pain.  · Rapid heartbeat and breathing.  · Loss of appetite.  · Nausea or vomiting.  How is this diagnosed?  This condition may be diagnosed based on:  · Your symptoms and medical history.  · A physical exam.  · X-rays of your blood vessels after injecting dye into your blood vessels (arteriogram).  · Blood tests to check for infection.  · Imaging tests such as a CT scan or MRI.  · Testing a sample of wound drainage for bacteria (culture).  · Testing a tissue sample for cell death (biopsy).  · Surgery to look for gangrene inside your body.  How is this treated?  Treatment for gangrene depends on the cause and the area of your body that is affected. Gangrene is usually treated in the hospital. It is very important to start treatment early, before gangrene spreads. Treatment may include:  · High doses of IV antibiotic medicine, for gangrene caused by infection.  · Surgery. Surgical options may include:  ? Debridement. This is surgery to remove dead tissue. You may need to have debridement several times.  ? Bypass or angioplasty. These surgeries improve blood flow to an affected area.  ? Amputation. This is surgery to remove a body part. This may be necessary in very severe cases.  · Oxygen therapy. This involves treatment in a chamber designed to provide high levels of oxygen (hyperbaric oxygen therapy). It can improve the oxygen supply to an affected area.  · Supportive care to keep the rest of your body healthy. This may include:  ? IV fluids and nutrients.  ? Pain medicines.  ? Blood thinners to prevent blood clots.  Follow these instructions at home:  Skin and wound care  · Clean any cuts or scratches with a germ-killing (antiseptic) solution. Your health care provider may recommend certain  over-the-counter antiseptics.  · Cover any open wounds with a clean bandage. Change the bandage as often as needed to keep the wound clean. Make sure to wash your hands before touching your bandage.  · Check wounds every day for signs of infection, such as:  ? Redness, swelling, or pain.  ? Fluid or blood.  ? Warmth.  ? Pus or a bad smell.  · If you have diabetes or another blood vessel disease, check your feet every day for signs of injury or infection. You may be at higher risk for gangrene.  Lifestyle  · Do not use any products that contain nicotine or tobacco, such as cigarettes and e-cigarettes. These can delay wound healing. If you need help quitting, ask your health care provider.  · Limit alcohol intake to no more than 1 drink a day for women (no drinks if you are pregnant) and 2 drinks a day for men. One drink equals 12 oz of beer, 5 oz of wine, or 1½ oz of hard liquor.  · Eat a healthy diet and maintain a healthy weight.  · Get some exercise on most days of the week. Ask your health care provider what forms of exercise may be best for you.  General instructions  · Keep all follow-up visits as told by your health care provider. This is important.  Medicines  · Take over-the-counter and prescription medicines only as told by your health care provider.  · If you were prescribed an antibiotic medicine, take it as told by your health care provider. Do not stop taking the antibiotic even if you start to feel better.  · If you are taking blood thinners:  ? Talk with your health care provider before you take any medicines that contain aspirin or NSAIDs. These medicines increase your risk for dangerous bleeding.  ? Take your medicine exactly as told, at the same time every day.  ? Avoid activities that could cause injury or bruising, and follow instructions about how to prevent falls.  ? Wear a medical alert bracelet or carry a card that lists what medicines you take.  Contact a health care provider if:  · You have  a wound or sore that is not healing.  Get help right away if:  · You have a wound or sore that shows signs of infection.  · An area of your skin turns white, red, blue, or black.  · You have rapidly worsening pain at the site of a skin infection or wound.  · You experience numbness at the site of a skin infection or wound.  · You have unexplained:  ? Fever.  ? Chills.  ? Confusion.  ? Fainting.  Summary  · Gangrene is a medical condition that is caused by a lack of blood supply to an area of your body.  · It is most common on the skin (external gangrene), but it can also affect internal body parts (internal gangrene).  · Injuries and blood vessel diseases are common causes of gangrene.  · Gangrene requires emergency medical treatment. Treatment may include hospitalization, antibiotics, or surgery.  This information is not intended to replace advice given to you by your health care provider. Make sure you discuss any questions you have with your health care provider.  Document Released: 10/19/2005 Document Revised: 11/30/2018 Document Reviewed: 09/25/2018  [employer] Patient Education © 2020 [employer]      Depression / Suicide Risk    As you are discharged from this [facility] facility, it is important to learn how to keep safe from harming yourself.    Recognize the warning signs:  · Abrupt changes in personality, positive or negative- including increase in energy   · Giving away possessions  · Change in eating patterns- significant weight changes-  positive or negative  · Change in sleeping patterns- unable to sleep or sleeping all the time   · Unwillingness or inability to communicate  · Depression  · Unusual sadness, discouragement and loneliness  · Talk of wanting to die  · Neglect of personal appearance   · Rebelliousness- reckless behavior  · Withdrawal from people/activities they love  · Confusion- inability to concentrate     If you or a loved one observes any of these behaviors or has concerns about  self-harm, here's what you can do:  · Talk about it- your feelings and reasons for harming yourself  · Remove any means that you might use to hurt yourself (examples: pills, rope, extension cords, firearm)  · Get professional help from the community (Mental Health, Substance Abuse, psychological counseling)  · Do not be alone:Call your Safe Contact- someone whom you trust who will be there for you.  · Call your local CRISIS HOTLINE 511-1275 or 758-507-9406  · Call your local Children's Mobile Crisis Response Team [place] (205) 409-1924 or www.Zapnip  · Call the toll free National Suicide Prevention Hotlines   · National Suicide Prevention Lifeline 090-444-GKYI (0122)  · National Hope Line Network 800-SUICIDE (766-7266)          Discharge Instructions per Anitra Clemente M.D.    Please follow up with your PCP and Urology.     ACTIVITY: as tolerated    DIAGNOSIS: kira's gangrene    Return to ER if you develop new or worsening symptoms.

## 2020-08-31 NOTE — DISCHARGE SUMMARY
Discharge Summary    CHIEF COMPLAINT ON ADMISSION  No chief complaint on file.      Reason for Admission  Fornier's Gangrene     Admission Date  8/22/2020    CODE STATUS  Full Code    HPI & HOSPITAL COURSE  This is a 70 y.o. male with Lupus, HFrEF, pAfib, polycythemia vera, and recent admission for Kira's gangrene here with dizziness and chest pain. On his recent admission he underwent multiple washouts and wound vac placement. He completed his full course of Augmentin and fluconazole on 8/17.    He had also just recently had an excision of a cyst and was given doxycyline empirically but he developed nausea and vomiting. It was also associated with lip swelling, when he stopped taking the doxycyline, the swelling resolved. Doxycycline was added to his allergy list.     In terms of her recent kira's gangrene, it was recommended that she transfer from [facility] for a skin graft. Procedure was performed and wound vac was replaced. He continued to improve, wound vac and go was removed. He had no difficulty urinating and was eager for discharge. There was some pelvic/scrotal discomfort which prompted a repeat CT. It revealed a small amount of gas in he right hemiscrotum for which urology was re-consulted. There are many reasons for him to have a small amount of gas given his past Kira's and skin graft. After discussion with ID, it was decided to restart on Augmentin and fluconazole for a 10 day course, based on past sensitivities.     Therefore, he is discharged in good and stable condition to home with organized home healthcare and close outpatient follow-up.    The patient met 2-midnight criteria for an inpatient stay at the time of discharge.    Discharge Date  8/31/2020    FOLLOW UP ITEMS POST DISCHARGE  Please follow up with your PCP and Urology.    DISCHARGE DIAGNOSES  Principal Problem (Resolved):    Kira's gangrene of scrotum POA: Yes  Active Problems:    Paroxysmal A-fib  (HCC) POA: Yes    Chronic systolic heart failure (HCC) POA: Yes    Type 2 diabetes mellitus, with long-term current use of insulin (HCC) POA: Yes      Overview: Chronic condition treated with Glipizide and Lantus.      Sliding scale insulin during acute hospitalization.          Polycythemia vera (HCC) POA: Yes    SLE (systemic lupus erythematosus related syndrome) (HCC) (Chronic) POA: Yes    History of TIA (transient ischemic attack) POA: Yes    Nephrolithiasis POA: Clinically Undetermined    Stenosis of right carotid artery-Right CEA 3/21/18 POA: Yes      Overview: 90% right carotid stenosis      Right CEA 3/21      Benny Morfin MD - vascular surgery  Resolved Problems:    Hypokalemia POA: Yes    Urinary retention POA: Unknown      FOLLOW UP  Future Appointments   Date Time Provider Department Center   10/19/2020 12:45 PM Eunice Law A.P.R.N. [facility] None     Malissa Thomson M.D.  [address]  [address]  [facility] [address]  [phone]          [facility]  [address]  [address]  [facility] [address]  [phone]        Adin Jackson M.D.  [address]  [facility] [address]  [phone]            MEDICATIONS ON DISCHARGE     Medication List      START taking these medications      Instructions   amoxicillin-clavulanate 875-125 MG Tabs  Commonly known as: AUGMENTIN   Take 1 Tab by mouth every 12 hours for 8 days.  Dose: 1 Tab     fluconazole 200 MG Tabs  Commonly known as: DIFLUCAN   Take 1 Tab by mouth every day for 8 days.  Dose: 200 mg     gabapentin 300 MG Caps  Commonly known as: NEURONTIN   Take 2 Caps by mouth 3 times a day.  Dose: 600 mg     oxyCODONE immediate release 10 MG immediate release tablet  Commonly known as: ROXICODONE   Take 1 Tab by mouth every 6 hours as needed for Severe Pain for up to 5 days.  Dose: 10 mg        CHANGE how you take these medications      Instructions   hydroxyurea 500 MG Caps  What changed:   · when to take this  · additional  "instructions  Commonly known as: HYDREA   Take 1 Cap by mouth every day for 30 days.  Dose: 500 mg        CONTINUE taking these medications      Instructions   apixaban 5mg Tabs  Commonly known as: Eliquis   Take 1 Tab by mouth 2 Times a Day.  Dose: 5 mg     aspirin EC 81 MG Tbec  Commonly known as: ECOTRIN   Take 1 Tab by mouth every day.  Dose: 81 mg     azaTHIOprine 50 MG Tabs  Commonly known as: IMURAN   Take 50 mg by mouth 2 Times a Day. TAKE 1 TABLET BY MOUTH TWICE A DAY  Dose: 50 mg     finasteride 5 MG Tabs  Commonly known as: PROSCAR   Take 5 mg by mouth every evening.  Dose: 5 mg     naproxen 250 MG Tabs  Commonly known as: NAPROSYN   Take 750 mg by mouth 2 times a day as needed. Indications: Pain  Dose: 750 mg     omeprazole 20 MG delayed-release capsule  Commonly known as: PRILOSEC   Take 1 Cap by mouth every day.  Dose: 20 mg     predniSONE 20 MG Tabs  Commonly known as: DELTASONE   Take 40 mg by mouth every day.  Dose: 40 mg     tamsulosin 0.4 MG capsule  Commonly known as: FLOMAX   Take 0.8 mg by mouth every evening.  Dose: 0.8 mg     therapeutic multivitamin-minerals Tabs   Take 1 Tab by mouth every day.  Dose: 1 Tab     Tresiba FlexTouch 200 UNIT/ML Sopn  Generic drug: Insulin Degludec   Inject 18 Units as instructed every evening.  Dose: 18 Units        STOP taking these medications    midodrine 5 MG Tabs  Commonly known as: PROAMATINE            Allergies  Allergies   Allergen Reactions   • Doxycycline      Swelling lips and difficulties swallowing   • Beta Adrenergic Blockers Unspecified     dizziness   • Metformin Unspecified and Palpitations     Cardiac effects  \"Similar to a heart attack, I was hospitalized\"   • Simvastatin      Other reaction(s): Other (Specify with Comments)  Myalgias  Myalgias   • Statins [Hmg-Coa-R Inhibitors]      Muscle ache, joint pain       DIET  Orders Placed This Encounter   Procedures   • Diet Order Diabetic     Standing Status:   Standing     Number of Occurrences: "   1     Order Specific Question:   Diet:     Answer:   Diabetic [3]       ACTIVITY  As tolerated.  Weight bearing as tolerated    CONSULTATIONS  Plastic Surgery  Urology    PROCEDURES  CT-ABDOMEN-PELVIS WITH   Final Result      1.  Small gas identified in the right hemiscrotum posteriorly. This is concerning for Landon's gangrene. The appearance however is markedly improved from just over one month ago      2.  Otherwise stable evaluation with evidence of mild retroperitoneal adenopathy which most likely is reactive.      3.  Prostate enlargement with bladder wall thickening likely from chronic outlet obstruction. Recommend clinical correlation      4.  Mild splenomegaly      5.  Nonobstructive 5 mm nephrolith      6.  Medium-sized pericardial effusion, left ventricular dilatation, chronic infarction        PROCEDURES:  1.  Split-thickness skin graft to scrotal area and right groin (12x10 cm).  2.  Excisional opening of sinus tract, right groin.  3.  Placement of wound VAC.    LABORATORY  Lab Results   Component Value Date    SODIUM 140 08/30/2020    POTASSIUM 3.8 08/30/2020    CHLORIDE 104 08/30/2020    CO2 27 08/30/2020    GLUCOSE 139 (H) 08/30/2020    BUN 16 08/30/2020    CREATININE 0.78 08/30/2020        Lab Results   Component Value Date    WBC 2.9 (L) 08/30/2020    HEMOGLOBIN 11.5 (L) 08/30/2020    HEMATOCRIT 37.6 (L) 08/30/2020    PLATELETCT 225 08/30/2020        Total time of the discharge process exceeds 41 minutes.

## 2020-08-31 NOTE — DISCHARGE PLANNING
Anticipated Discharge Disposition: Home with [facility]    Action:   · Completed chart review  · Pt was planning to dc yesterday 8/29, however, CT scan of abdomen and pelvis showed slight gas formation- possible concern for infection  · Pt started on PO Augmentin and Fluconazole.     Barriers to Discharge: Medical clearance    Plan: Continue to collaborate with the pt, pt's family, and health care team to provide social and discharge support as needed.      1055: Discussed pt's POC and DC with the BS RN. Pt is medically cleared for dc home with [facility]. Per LOLA RN pt does not need wound vac and dressing supplies ordered through [facility]. Called July [facility], to inform her the pt does not need the wound vac and dressing supplies. July states the supplies will be picked up from [facility] case managment office and pt's account needs to be credited.     1123: Lucero, with [facility], picked up wound vac and dressing supplies. RN CM faxed RX for Oxy to [facility]. Co-Pay estimate for all 4 medications: $2.75; BS RN notified. Meds will be delivered to bedside.

## 2020-08-31 NOTE — CARE PLAN
Problem: Communication  Goal: The ability to communicate needs accurately and effectively will improve  Outcome: PROGRESSING AS EXPECTED  Pt calls appropriately     Problem: Pain Management  Goal: Pain level will decrease to patient's comfort goal  Outcome: PROGRESSING AS EXPECTED  Pt rates pain 5/10, medicated per MAR

## 2020-08-31 NOTE — PROGRESS NOTES
Pt A&Ox4, calls appropriately  Pt rates pain 5/10, medicated per MAR  Pt denies N/V, -numbness, -tingling  Rigth thigh skin graft donor site with dressing in place with old drainage  Scrotum with CDI dressing in place, changed today, daily dressing changes per order  Last BM 8/30, +flatus, +void  Pt on diabetic diet, tolerating well  Pt up SBA with FWW, tolerates well  Hourly rounding in place, call light and belongings in place, all needs met at this time

## 2020-09-01 NOTE — DOCUMENTATION QUERY
[facility]                                                                       Query Response Note      PATIENT:               MARZENA ROMEO  ACCT #:                  [id number]  MRN:                     [id number]  :                      1950  ADMIT DATE:       2020 1:31 PM  DISCH DATE:        2020 2:19 PM  RESPONDING  PROVIDER #:        [id number]           QUERY TEXT:    Patient presented with Landon's gangrene. A query was posed regarding the conflicting types of Diabetes. Query response was Type 2 DM with gangrene.     [facility] has provided guidance to coders that Landon's gangrene is not diabetic gangrene and only a code for Landon's gangrene should be coded.     Based on clinical findings, risk factors and treatment, can this patient's gangrene be further clarified as    The patient's Clinical Indicators include:    Query response to clarify type of DM:  Type 2 DM with gangrene    Discharge Diagnoses  Landon's gangrene of scrotum    Treatment:  Multiple debridements  Options provided:   -- Landon's gangrene   -- Diabetic gangrene   -- Landon's gangrene with a separately superimposed diabetic gangrene   -- Unable to determine      Query created by: Ivonne Barker on 2020 1:42 AM    RESPONSE TEXT:    Landon's gangrene          Electronically signed by:  FABIO HOLLINGSWORTH MD 2020 8:07 AM

## 2020-09-02 ENCOUNTER — TELEMEDICINE (OUTPATIENT)
Dept: MEDICAL GROUP | Facility: MEDICAL CENTER | Age: 70
End: 2020-09-02
Payer: MEDICARE

## 2020-09-02 VITALS
SYSTOLIC BLOOD PRESSURE: 112 MMHG | WEIGHT: 180 LBS | DIASTOLIC BLOOD PRESSURE: 74 MMHG | BODY MASS INDEX: 22.38 KG/M2 | TEMPERATURE: 97.8 F | HEIGHT: 75 IN

## 2020-09-02 DIAGNOSIS — N49.3 FOURNIER'S GANGRENE OF SCROTUM: ICD-10-CM

## 2020-09-02 DIAGNOSIS — D45 POLYCYTHEMIA VERA (HCC): ICD-10-CM

## 2020-09-02 DIAGNOSIS — D75.1 POLYCYTHEMIA: ICD-10-CM

## 2020-09-02 DIAGNOSIS — I48.0 PAROXYSMAL A-FIB (HCC): ICD-10-CM

## 2020-09-02 DIAGNOSIS — I50.22 CHRONIC SYSTOLIC HEART FAILURE (HCC): ICD-10-CM

## 2020-09-02 PROCEDURE — 99214 OFFICE O/P EST MOD 30 MIN: CPT | Mod: 95,CR | Performed by: NURSE PRACTITIONER

## 2020-09-02 ASSESSMENT — FIBROSIS 4 INDEX: FIB4 SCORE: 1.13

## 2020-09-02 NOTE — PROGRESS NOTES
"Telemedicine: Established Patient   This evaluation was conducted via [employer] using secure and encrypted videoconferencing technology. The patient was in a private location in the state of [place].    The patient's identity was confirmed and verbal consent was obtained for this virtual visit.    Subjective:   CC:   Chief Complaint   Patient presents with   • Follow-Up     ER visit       Francesco Schulte is a 70 y.o. male established patient of Dr. Thomson with multiple medical problems seen today for virtual visit to follow-up on recent hospitalization.    Chronic systolic heart failure (HCC)  Echo performed during recent hospitalization, no significant change from prior evaluation in July.  Left ventricular ejection fraction estimated at 30%.    Paroxysmal A-fib (HCC)  Rate controlled during recent hospitalization.  He is on anticoagulation therapy    Polycythemia  Chronic issue followed by hematology oncology.  States that he will be having a CBC checked every 3 days, follow-up planned with specialist Friday this week.  He is on hydroxyurea    Landon's gangrene of scrotum  Recent hospitalization with multiple surgical procedures, wound VAC related to this.  Patient discharged yesterday and reports that he is doing well at this time.  He is still on oral antibiotic therapy, no longer has a wound VAC.  He has home health scheduled to come later today  No fever, chills, nausea, acute pain        ROS      Allergies   Allergen Reactions   • Doxycycline      Swelling lips and difficulties swallowing   • Beta Adrenergic Blockers Unspecified     dizziness   • Metformin Unspecified and Palpitations     Cardiac effects  \"Similar to a heart attack, I was hospitalized\"   • Simvastatin      Other reaction(s): Other (Specify with Comments)  Myalgias  Myalgias   • Statins [Hmg-Coa-R Inhibitors]      Muscle ache, joint pain       Current medicines (including changes today)  Current Outpatient Medications   Medication Sig " Dispense Refill   • fluconazole (DIFLUCAN) 200 MG Tab Take 1 Tab by mouth every day for 8 days. 8 Tab 0   • amoxicillin-clavulanate (AUGMENTIN) 875-125 MG Tab Take 1 Tab by mouth every 12 hours for 8 days. 16 Tab 0   • gabapentin (NEURONTIN) 300 MG Cap Take 2 Caps by mouth 3 times a day. 90 Cap 0   • hydroxyurea (HYDREA) 500 MG Cap Take 1 Cap by mouth every day for 30 days. 30 Cap 0   • oxyCODONE immediate release (ROXICODONE) 10 MG immediate release tablet Take 1 Tab by mouth every 6 hours as needed for Severe Pain for up to 5 days. 7 Tab 0   • azaTHIOprine (IMURAN) 50 MG Tab Take 50 mg by mouth 2 Times a Day. TAKE 1 TABLET BY MOUTH TWICE A DAY     • predniSONE (DELTASONE) 20 MG Tab Take 40 mg by mouth every day.     • omeprazole (PRILOSEC) 20 MG delayed-release capsule Take 1 Cap by mouth every day. 30 Cap 1   • finasteride (PROSCAR) 5 MG Tab Take 5 mg by mouth every evening.     • Insulin Degludec (TRESIBA FLEXTOUCH) 200 UNIT/ML Solution Pen-injector Inject 18 Units as instructed every evening.     • tamsulosin (FLOMAX) 0.4 MG capsule Take 0.8 mg by mouth every evening.     • naproxen (NAPROSYN) 250 MG Tab Take 750 mg by mouth 2 times a day as needed. Indications: Pain     • apixaban (ELIQUIS) 5mg Tab Take 1 Tab by mouth 2 Times a Day. 180 Tab 3   • aspirin EC (ECOTRIN) 81 MG Tablet Delayed Response Take 1 Tab by mouth every day. 30 Tab    • therapeutic multivitamin-minerals (THERAGRAN-M) Tab Take 1 Tab by mouth every day.       No current facility-administered medications for this visit.        Patient Active Problem List    Diagnosis Date Noted   • Landon's gangrene of scrotum 07/25/2020     Priority: High   • Vertigo 08/09/2018     Priority: High   • Hypercoagulable state (HCC) 03/21/2018     Priority: High   • Dizziness 03/10/2018     Priority: High   • History of ST elevation myocardial infarction (STEMI) 03/17/2017     Priority: High   • Coagulopathy (HCC) 03/13/2017     Priority: High   • Thrombocytosis  ([facility]) 03/12/2017     Priority: High   • STEMI (ST elevation myocardial infarction) ([facility]) 03/11/2017     Priority: High   • Chronic headache 03/21/2018     Priority: Medium   • History of stroke- old right parietal/occipital 03/10/2018     Priority: Medium   • Leukopenia 03/10/2018     Priority: Medium   • Carotid stenosis, 90% right carotid 03/10/2018     Priority: Medium   • Chronic systolic heart failure ([facility]) 03/13/2017     Priority: Medium   • Type 2 diabetes mellitus, with long-term current use of insulin ([facility]) 03/13/2017     Priority: Medium   • CAD (coronary artery disease) 03/13/2017     Priority: Medium   • BPH (benign prostatic hyperplasia) 03/12/2017     Priority: Medium   • Paroxysmal A-fib ([facility]) 03/12/2017     Priority: Medium   • Polycythemia 12/01/2016     Priority: Medium   • Stenosis of right carotid artery-Right CEA 3/21/18 03/21/2018     Priority: Low   • Chronic pain syndrome 09/13/2017     Priority: Low   • SIRS (systemic inflammatory response syndrome) ([facility]) 03/16/2017     Priority: Low   • Dyslipidemia 03/12/2017     Priority: Low   • Nephrolithiasis 08/30/2020   • History of TIA (transient ischemic attack) 08/20/2020   • Advanced care planning/counseling discussion 08/16/2020   • Constipation 08/15/2020   • Age-related physical debility 08/07/2020   • SLE (systemic lupus erythematosus related syndrome) ([facility]) 07/25/2020   • Pancreatic cyst 07/25/2020   • Hyperglycemia 07/02/2020   • Lip swelling 07/01/2020   • Medication intolerance 07/01/2020   • Generalized pain 07/01/2020   • Elevated brain natriuretic peptide (BNP) level 07/01/2020   • Lupus ([facility]) 11/15/2019   • Encounter for long-term (current) use of insulin ([facility]) 05/06/2019   • Indigestion 04/25/2019   • Polycythemia vera ([facility]) 01/03/2019   • Multiple joint pain 10/03/2018   • Nausea and vomiting 08/09/2018   • Elevated sed rate- R/O PMR 04/04/2018   • New daily persistent headache- R/O PMR; trial steroids 04/04/2018   • Orthostatic  "hypotension 03/29/2018   • Chronic daily headache 01/09/2018   • Agent orange exposure 09/13/2017   • Risk for falls 09/13/2017   • Long term (current) use of anticoagulants [Z79.01] 08/21/2017   • Statin intolerance 03/12/2017       No family history on file.    He  has a past medical history of Anesthesia, Cancer (HCC), Diabetes (HCC), Family history of punctured lung (2002), Heart attack (HCC) (03/2017), Hemorrhagic disorder (HCC), High cholesterol, Ischemic cardiomyopathy, Kidney stones, Orthostatic hypotension (3/29/2018), Pain, Polycythemia vera(238.4), Stroke ([facility]) (2016), Urinary bladder disorder, and Vertigo.  He  has a past surgical history that includes other cardiac surgery; cath placement; laminotomy; appendectomy; carotid endarterectomy (Right, 3/21/2018); temporal artery biopsy (Right, 6/26/2018); pr incis/drain scrotum/testis,epididym (Right, 7/25/2020); pr explore scrotum (Right, 7/29/2020); pr explore scrotum (Right, 7/31/2020); skin abscess incision and drainage (Right, 8/3/2020); and split thickness skin graft (N/A, 8/23/2020).       Objective:   /74   Temp 36.6 °C (97.8 °F)   Ht 1.905 m (6' 3\")   Wt 81.6 kg (180 lb)   BMI 22.50 kg/m²     Physical Exam  Physical Exam:  Constitutional: Alert, no distress, well-groomed.  Skin: No rashes in visible areas.  Eye: Round. Conjunctiva clear, lids normal. No icterus.   ENMT: Lips pink without lesions, good dentition, moist mucous membranes. Phonation normal.  Neck: No masses, no thyromegaly. Moves freely without pain.  CV: Pulse as reported by patient  Respiratory: Unlabored respiratory effort, no cough or audible wheeze  Psych: Alert and oriented x3, normal affect and mood.       Assessment and Plan:   The following treatment plan was discussed:     1. Landon's gangrene of scrotum  Recent hospital records reviewed, multiple I&D procedures related to this.  Now reports that he is recovering well.  No fever or increase in pain since hospital " discharge.  Advised to complete antibiotic course.  Home health RN will be seeing him today  2. Polycythemia vera ([facility])  Followed by hematology, appointment later this week.  3. Chronic systolic heart failure ([facility])  Recent echo with no significant change, continue to monitor  4. Paroxysmal A-fib ([facility])  Stable at this time    Follow-up: 2 weeks

## 2020-09-02 NOTE — ASSESSMENT & PLAN NOTE
Echo performed during recent hospitalization, no significant change from prior evaluation in July.  Left ventricular ejection fraction estimated at 30%.

## 2020-09-02 NOTE — ASSESSMENT & PLAN NOTE
Chronic issue followed by hematology oncology.  States that he will be having a CBC checked every 3 days, follow-up planned with specialist Friday this week.  He is on hydroxyurea

## 2020-09-02 NOTE — ASSESSMENT & PLAN NOTE
Recent hospitalization with multiple surgical procedures, wound VAC related to this.  Patient discharged yesterday and reports that he is doing well at this time.  He is still on oral antibiotic therapy, no longer has a wound VAC.  He has home health scheduled to come later today  No fever, chills, nausea, acute pain

## 2020-09-03 DIAGNOSIS — N49.3 FOURNIER GANGRENE: ICD-10-CM

## 2020-09-03 NOTE — PROGRESS NOTES
1. Caller Name: Nurse at Home health                        Call Back Number: No number provided      How would the patient prefer to be contacted with a response: N/A    Nurse wants referral for wound clinic placed for pt, She is doing weekly rounding with pt, wound is not improving.    CELIA Sempel  Med Ass't

## 2020-09-04 NOTE — DOCUMENTATION QUERY
"                                                                         [facility]                                                                       Query Response Note      PATIENT:               MARZENA ROMEO  ACCT #:                  [id number]  MRN:                     [id number]  :                      1950  ADMIT DATE:       2020 1:31 PM  DISCH DATE:        2020 2:19 PM  RESPONDING  PROVIDER #:        [id number]           QUERY TEXT:    Debridement is documented in the Medical Record.     Please specify the type.      NOTE:  If an appropriate response is not listed below, please respond with a new note.          The patient's Clinical Indicators include:    Operative Note   \"A vertical incision was made over the right hemiscrotum.  Initially, this was just approximately 4 cm in length through the skin, but once down, was able to see that the purulent fluid did drain from this incision.  With an index finger, I was able  to dissect out under the skin and opened the incision further.  Eventually, the incision was opened to approximately 10-12 cm in length in a vertical manner.  On the very posterior of the scrotum on the right side, there was a dimpling suggestive of this was where his prior site of infection was.  At this point, I was able to open the wound up completely.  I got around the testicle circumferentially and around the cord.  I was able to dissect away up   and along the cord up to the inguinal canal to the internal ring.  I did dissect all the way around the testicle, freeing it up and several side pockets.  I did dissect down posterior into the posterior aspects of the scrotum down along this old drainage site.  I did get even deeper towards the rectum, but this did not communicate with the rectum, and there is no evidence of any inflammation around the rectum.  The wound was irrigated with saline.  At this point, there was some necrotic tissue that was debrided with " "sharp and blunt dissection.  Eventually, I was able to get all the way around this area and examined closely.  Several areas of necrotic tissue were down in this deep pocket posterior to the testicle\"  Options provided:   -- Excisional debridement -defined as the surgical removal or cutting away of such tissue, necrosis, or slough and is classified to the root operation \"Excision.\" Use of a sharp instrument does not always indicate that an excisional debridement was performed. Minor removal of loose fragments with scissors or using a sharp instrument to scrape away tissue is not an excisional debridement. Excisional debridement involves the use of a scalpel to remove devitalized tissue.   -- Non-excisional debridement of the skin-defined as the nonoperative brushing, irrigating, scrubbing, or washing of devitalized tissue, necrosis, slough, or foreign material.   -- Unable to determine      Query created by: Ivonne Barker on 9/1/2020 1:41 AM    RESPONSE TEXT:    Excisional debridement -defined as the surgical removal or cutting away of such tissue, necrosis, or slough and is classified to the root operation \"Excision.\" Use of a sharp instrument does not always indicate that an excisional debridement was performed. Minor removal of loose fragments with scissors or using a sharp instrument to scrape away tissue is not an excisional debridement. Excisional debridement involves the use of a scalpel to remove devitalized tissue.          Electronically signed by:  CUONG MOSLEY MD 9/4/2020 4:41 PM              "

## 2020-09-08 ENCOUNTER — HOSPITAL ENCOUNTER (OUTPATIENT)
Facility: MEDICAL CENTER | Age: 70
End: 2020-09-08
Attending: NURSE PRACTITIONER
Payer: MEDICARE

## 2020-09-08 LAB
ALBUMIN SERPL BCP-MCNC: 3.9 G/DL (ref 3.2–4.9)
ALBUMIN/GLOB SERPL: 1.2 G/DL
ALP SERPL-CCNC: 57 U/L (ref 30–99)
ALT SERPL-CCNC: 13 U/L (ref 2–50)
ANION GAP SERPL CALC-SCNC: 12 MMOL/L (ref 7–16)
AST SERPL-CCNC: 17 U/L (ref 12–45)
BILIRUB SERPL-MCNC: 0.4 MG/DL (ref 0.1–1.5)
BUN SERPL-MCNC: 14 MG/DL (ref 8–22)
CALCIUM SERPL-MCNC: 9.5 MG/DL (ref 8.5–10.5)
CHLORIDE SERPL-SCNC: 102 MMOL/L (ref 96–112)
CO2 SERPL-SCNC: 24 MMOL/L (ref 20–33)
CREAT SERPL-MCNC: 0.78 MG/DL (ref 0.5–1.4)
GLOBULIN SER CALC-MCNC: 3.3 G/DL (ref 1.9–3.5)
GLUCOSE SERPL-MCNC: 156 MG/DL (ref 65–99)
POTASSIUM SERPL-SCNC: 4.5 MMOL/L (ref 3.6–5.5)
PROT SERPL-MCNC: 7.2 G/DL (ref 6–8.2)
SODIUM SERPL-SCNC: 138 MMOL/L (ref 135–145)

## 2020-09-08 PROCEDURE — 80053 COMPREHEN METABOLIC PANEL: CPT

## 2020-09-10 RX ORDER — TAMSULOSIN HYDROCHLORIDE 0.4 MG/1
0.8 CAPSULE ORAL EVERY EVENING
Qty: 30 CAP | Refills: 3 | Status: SHIPPED | OUTPATIENT
Start: 2020-09-10 | End: 2020-09-13 | Stop reason: SDUPTHER

## 2020-09-12 ENCOUNTER — PATIENT MESSAGE (OUTPATIENT)
Dept: MEDICAL GROUP | Facility: MEDICAL CENTER | Age: 70
End: 2020-09-12

## 2020-09-13 ENCOUNTER — PATIENT MESSAGE (OUTPATIENT)
Dept: MEDICAL GROUP | Facility: MEDICAL CENTER | Age: 70
End: 2020-09-13

## 2020-09-13 RX ORDER — TAMSULOSIN HYDROCHLORIDE 0.4 MG/1
0.8 CAPSULE ORAL DAILY
Qty: 60 CAP | Refills: 6 | Status: SHIPPED | OUTPATIENT
Start: 2020-09-13 | End: 2021-07-09

## 2020-09-13 NOTE — TELEPHONE ENCOUNTER
From: Francesco Schulte  To: ALVINO Harman  Sent: 9/13/2020 10:59 AM PDT  Subject: Non-Urgent Medical Question    Yes 2 per day      ----- Message -----   From:ALVINO Harman   Sent:9/13/2020 10:57 AM PDT   To:Francesco Schulte   Subject:RE: Non-Urgent Medical Question    You were taking 2 tablets daily, correct?      ----- Message -----   From:Francesco Schulte   Sent:9/12/2020 8:58 PM PDT   To:ALVINO Harman   Subject:Non-Urgent Medical Question    Could you please refill my Flomax prescription at [facility]. I came home from the hospital to find that I am out and they somehow dismissed all of next year's prescriptions and I have to reinstate them.  Thank you

## 2020-09-13 NOTE — TELEPHONE ENCOUNTER
From: Francesco Schulte  To: ALVINO Harman  Sent: 9/12/2020 8:58 PM PDT  Subject: Non-Urgent Medical Question    Could you please refill my Flomax prescription at [facility]. I came home from the hospital to find that I am out and they somehow dismissed all of next year's prescriptions and I have to reinstate them.  Thank you

## 2020-09-14 ENCOUNTER — PATIENT MESSAGE (OUTPATIENT)
Dept: MEDICAL GROUP | Facility: MEDICAL CENTER | Age: 70
End: 2020-09-14

## 2020-09-15 NOTE — DOCUMENTATION QUERY
"                                                                         [facility]                                                                       Query Response Note      PATIENT:               MARZENA ROMEO  ACCT #:                  [id number]  MRN:                     [id number]  :                      1950  ADMIT DATE:       2020 1:31 PM  DISCH DATE:        2020 2:19 PM  RESPONDING  PROVIDER #:        [id number]           QUERY TEXT:    Debridement is documented in the Medical Record.     Please specify the type.      NOTE:  If an appropriate response is not listed below, please respond with a new note.            The patient's Clinical Indicators include:    Operative Note     \"The testicle was on the cord, was intact. There was some dependent purulent material in the scrotum.  There was number of areas of some superficial necrotic tissue.  I was able to identify 3 small undrained pockets of pus, 2 in the scrotum and 1 up towards the inguinal canal.  We worked to palpably disrupt any areas that were causing blockage to prevent purulent material from being released.  I debrided the necrotic tissue that we could find with sharp debridement and cautery.\"  Options provided:   -- Excisional debridement -defined as the surgical removal or cutting away of such tissue, necrosis, or slough and is classified to the root operation \"Excision.\" Use of a sharp instrument does not always indicate that an excisional debridement was performed. Minor removal of loose fragments with scissors or using a sharp instrument to scrape away tissue is not an excisional debridement. Excisional debridement involves the use of a scalpel to remove devitalized tissue.   -- Non-excisional debridement of the skin-defined as the nonoperative brushing, irrigating, scrubbing, or washing of devitalized tissue, necrosis, slough, or foreign material.   -- Unable to determine      Query created by: Ivonne Barker on " "9/1/2020 1:41 AM    RESPONSE TEXT:    Excisional debridement -defined as the surgical removal or cutting away of such tissue, necrosis, or slough and is classified to the root operation \"Excision.\" Use of a sharp instrument does not always indicate that an excisional debridement was performed. Minor removal of loose fragments with scissors or using a sharp instrument to scrape away tissue is not an excisional debridement. Excisional debridement involves the use of a scalpel to remove devitalized tissue.          Electronically signed by:  NORM KAUR MD 9/15/2020 9:10 AM              "

## 2020-09-17 ENCOUNTER — TELEMEDICINE (OUTPATIENT)
Dept: MEDICAL GROUP | Facility: MEDICAL CENTER | Age: 70
End: 2020-09-17
Payer: MEDICARE

## 2020-09-17 VITALS
SYSTOLIC BLOOD PRESSURE: 120 MMHG | WEIGHT: 180 LBS | DIASTOLIC BLOOD PRESSURE: 75 MMHG | BODY MASS INDEX: 22.5 KG/M2 | TEMPERATURE: 98.1 F

## 2020-09-17 DIAGNOSIS — N49.3 FOURNIER'S GANGRENE OF SCROTUM: ICD-10-CM

## 2020-09-17 DIAGNOSIS — T88.7XXA MEDICATION SIDE EFFECT: ICD-10-CM

## 2020-09-17 DIAGNOSIS — R42 DIZZINESS: ICD-10-CM

## 2020-09-17 DIAGNOSIS — N36.8 URETHRAL IRRITATION: ICD-10-CM

## 2020-09-17 DIAGNOSIS — G47.09 OTHER INSOMNIA: ICD-10-CM

## 2020-09-17 DIAGNOSIS — I48.0 PAF (PAROXYSMAL ATRIAL FIBRILLATION) (HCC): ICD-10-CM

## 2020-09-17 PROCEDURE — 99214 OFFICE O/P EST MOD 30 MIN: CPT | Mod: 95,CR | Performed by: NURSE PRACTITIONER

## 2020-09-17 RX ORDER — ONDANSETRON HYDROCHLORIDE 8 MG/1
8 TABLET, FILM COATED ORAL EVERY 8 HOURS PRN
Qty: 30 TAB | Refills: 0 | Status: SHIPPED | OUTPATIENT
Start: 2020-09-17 | End: 2020-12-11

## 2020-09-17 RX ORDER — HYDROXYZINE HYDROCHLORIDE 25 MG/1
25-50 TABLET, FILM COATED ORAL 3 TIMES DAILY PRN
Qty: 30 TAB | Refills: 0 | Status: SHIPPED | OUTPATIENT
Start: 2020-09-17 | End: 2020-12-11

## 2020-09-17 RX ORDER — OXYCODONE AND ACETAMINOPHEN 10; 325 MG/1; MG/1
1 TABLET ORAL EVERY 4 HOURS PRN
COMMUNITY
End: 2020-09-30

## 2020-09-17 ASSESSMENT — FIBROSIS 4 INDEX: FIB4 SCORE: 1.76

## 2020-09-22 PROBLEM — T88.7XXA MEDICATION SIDE EFFECT: Status: ACTIVE | Noted: 2020-09-22

## 2020-09-22 PROBLEM — R11.0 NAUSEA: Status: ACTIVE | Noted: 2018-08-09

## 2020-09-24 PROBLEM — N36.8 URETHRAL IRRITATION: Status: ACTIVE | Noted: 2020-09-24

## 2020-09-25 ENCOUNTER — PATIENT MESSAGE (OUTPATIENT)
Dept: MEDICAL GROUP | Facility: MEDICAL CENTER | Age: 70
End: 2020-09-25

## 2020-09-26 NOTE — PROGRESS NOTES
"Telemedicine: Established Patient   This evaluation was conducted via Printed Piece using secure and encrypted videoconferencing technology. The patient was in a private location in the state of [place].    The patient's identity was confirmed and verbal consent was obtained for this virtual visit.    Subjective:   CC:   Chief Complaint   Patient presents with   • Other     morphine withdrawls   • UTI     Andres catheter for 5 weeks, removed, now burning  when urinating, possible UTI   • Weight Loss       Francesco Schulte is a 70 y.o. male established patient with history of type 2 diabetes, CAD, polycythemia vera, lupus seen today for virtual visit.  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  Since hospital discharge his wounds are healing well.  Today he is concerned with possibility of opiate withdrawal or side effect.  He has been managing his postoperative pain with Percocet .  Reports that about 3 hours after taking a dose he will suddenly began to feel weak, dizzy, flushed.  He is concerned that this could be withdrawal.  His pain level has been reasonable, 4 most of the time.  Despite this, he has been taking the pain medicine about every 4 hours.  Additionally he is having some burning with urination.  He did have a catheter in place during his hospital stay, possible that there is some urethral irritation.  I would think UTI to be unlikely given the antibiotics he has received.  He denies fever, chills, hematuria  He is still experiencing some nausea.  Also states that he has not been sleeping which is a new problem.  Is lying awake for long periods of time during the night.    ROS      Allergies   Allergen Reactions   • Doxycycline      Swelling lips and difficulties swallowing   • Beta Adrenergic Blockers Unspecified     dizziness   • Metformin Unspecified and Palpitations     Cardiac effects  \"Similar to " "a heart attack, I was hospitalized\"   • Simvastatin      Other reaction(s): Other (Specify with Comments)  Myalgias  Myalgias   • Statins [Hmg-Coa-R Inhibitors]      Muscle ache, joint pain       Current medicines (including changes today)  Current Outpatient Medications   Medication Sig Dispense Refill   • oxyCODONE-acetaminophen (PERCOCET-10)  MG Tab Take 1 Tab by mouth every four hours as needed for Severe Pain.     • hydrOXYzine HCl (ATARAX) 25 MG Tab Take 1-2 Tabs by mouth 3 times a day as needed for Anxiety (sleep). 30 Tab 0   • ondansetron (ZOFRAN) 8 MG Tab Take 1 Tab by mouth every 8 hours as needed for Nausea/Vomiting. 30 Tab 0   • tamsulosin (FLOMAX) 0.4 MG capsule Take 2 Caps by mouth every day. 60 Cap 6   • gabapentin (NEURONTIN) 300 MG Cap Take 2 Caps by mouth 3 times a day. 90 Cap 0   • hydroxyurea (HYDREA) 500 MG Cap Take 1 Cap by mouth every day for 30 days. 30 Cap 0   • azaTHIOprine (IMURAN) 50 MG Tab Take 50 mg by mouth 2 Times a Day. TAKE 1 TABLET BY MOUTH TWICE A DAY     • predniSONE (DELTASONE) 20 MG Tab Take 40 mg by mouth every day.     • omeprazole (PRILOSEC) 20 MG delayed-release capsule Take 1 Cap by mouth every day. 30 Cap 1   • finasteride (PROSCAR) 5 MG Tab Take 5 mg by mouth every evening.     • Insulin Degludec (TRESIBA FLEXTOUCH) 200 UNIT/ML Solution Pen-injector Inject 18 Units as instructed every evening.     • naproxen (NAPROSYN) 250 MG Tab Take 750 mg by mouth 2 times a day as needed. Indications: Pain     • aspirin EC (ECOTRIN) 81 MG Tablet Delayed Response Take 1 Tab by mouth every day. 30 Tab    • therapeutic multivitamin-minerals (THERAGRAN-M) Tab Take 1 Tab by mouth every day.     • apixaban (ELIQUIS) 5mg Tab Take 1 Tab by mouth 2 Times a Day. 180 Tab 0     No current facility-administered medications for this visit.        Patient Active Problem List    Diagnosis Date Noted   • Landon's gangrene of scrotum 07/25/2020     Priority: High   • Vertigo 08/09/2018     " Priority: High   • Hypercoagulable state ([facility]) 03/21/2018     Priority: High   • Dizziness 03/10/2018     Priority: High   • History of ST elevation myocardial infarction (STEMI) 03/17/2017     Priority: High   • Coagulopathy ([facility]) 03/13/2017     Priority: High   • Thrombocytosis ([facility]) 03/12/2017     Priority: High   • STEMI (ST elevation myocardial infarction) ([facility]) 03/11/2017     Priority: High   • Chronic headache 03/21/2018     Priority: Medium   • History of stroke- old right parietal/occipital 03/10/2018     Priority: Medium   • Leukopenia 03/10/2018     Priority: Medium   • Carotid stenosis, 90% right carotid 03/10/2018     Priority: Medium   • Chronic systolic heart failure ([facility]) 03/13/2017     Priority: Medium   • Type 2 diabetes mellitus, with long-term current use of insulin ([facility]) 03/13/2017     Priority: Medium   • CAD (coronary artery disease) 03/13/2017     Priority: Medium   • BPH (benign prostatic hyperplasia) 03/12/2017     Priority: Medium   • Paroxysmal A-fib ([facility]) 03/12/2017     Priority: Medium   • Polycythemia 12/01/2016     Priority: Medium   • Stenosis of right carotid artery-Right CEA 3/21/18 03/21/2018     Priority: Low   • Chronic pain syndrome 09/13/2017     Priority: Low   • SIRS (systemic inflammatory response syndrome) ([facility]) 03/16/2017     Priority: Low   • Dyslipidemia 03/12/2017     Priority: Low   • Urethral irritation 09/24/2020   • Medication side effect 09/22/2020   • Nephrolithiasis 08/30/2020   • History of TIA (transient ischemic attack) 08/20/2020   • Advanced care planning/counseling discussion 08/16/2020   • Constipation 08/15/2020   • Age-related physical debility 08/07/2020   • SLE (systemic lupus erythematosus related syndrome) ([facility]) 07/25/2020   • Pancreatic cyst 07/25/2020   • Hyperglycemia 07/02/2020   • Lip swelling 07/01/2020   • Medication intolerance 07/01/2020   • Generalized pain 07/01/2020   • Elevated brain natriuretic peptide (BNP) level 07/01/2020   • Lupus  ([facility]) 11/15/2019   • Encounter for long-term (current) use of insulin ([facility]) 05/06/2019   • Indigestion 04/25/2019   • Polycythemia vera ([facility]) 01/03/2019   • Multiple joint pain 10/03/2018   • Nausea 08/09/2018   • Elevated sed rate- R/O PMR 04/04/2018   • New daily persistent headache- R/O PMR; trial steroids 04/04/2018   • Orthostatic hypotension 03/29/2018   • Chronic daily headache 01/09/2018   • Agent orange exposure 09/13/2017   • Risk for falls 09/13/2017   • Long term (current) use of anticoagulants [Z79.01] 08/21/2017   • Statin intolerance 03/12/2017       No family history on file.    He  has a past medical history of Anesthesia, Cancer ([facility]), Diabetes ([facility]), Family history of punctured lung (2002), Heart attack ([facility]) (03/2017), Hemorrhagic disorder ([facility]), High cholesterol, Ischemic cardiomyopathy, Kidney stones, Orthostatic hypotension (3/29/2018), Pain, Polycythemia vera(238.4), Stroke ([facility]) (2016), Urinary bladder disorder, and Vertigo.  He  has a past surgical history that includes other cardiac surgery; cath placement; laminotomy; appendectomy; carotid endarterectomy (Right, 3/21/2018); temporal artery biopsy (Right, 6/26/2018); pr incis/drain scrotum/testis,epididym (Right, 7/25/2020); pr explore scrotum (Right, 7/29/2020); pr explore scrotum (Right, 7/31/2020); skin abscess incision and drainage (Right, 8/3/2020); and split thickness skin graft (N/A, 8/23/2020).       Objective:   /75   Temp 36.7 °C (98.1 °F)   Wt 81.6 kg (180 lb)   BMI 22.50 kg/m²     Physical Exam    Assessment and Plan:   The following treatment plan was discussed:     1. Other insomnia  New problem in the last several days, states that he is not sleeping at all.  Possible side effect of the Percocet as well.  May try:  - hydrOXYzine HCl (ATARAX) 25 MG Tab; Take 1-2 Tabs by mouth 3 times a day as needed for Anxiety (sleep).  Dispense: 30 Tab; Refill: 0    2. Landon's gangrene of scrotum  Reports that wound is healing  well.  No fevers or increase in pain    3. Dizziness  I suspect his dizziness and weakness concern to be a side effect of the Percocet that he is taking.  Discussed opiate withdrawal would be very unlikely 3 hours after dose.  His pain has only been about a 4 out of 10, encouraged to start cutting back significantly on opiate medication use, continue to monitor pain control.  If dizziness and weakness does not resolve he will contact me    4. Medication side effect  As discussed above    5. Urethral irritation  Likely related to use of indwelling catheter during hospital stay.  He did have extensive antibiotic therapy so I would be surprised if this is actually a UTI.  Patient will monitor over the next few days, if this continues to bother him we will obtain a urine sample.    Other orders  - oxyCODONE-acetaminophen (PERCOCET-10)  MG Tab; Take 1 Tab by mouth every four hours as needed for Severe Pain.  - ondansetron (ZOFRAN) 8 MG Tab; Take 1 Tab by mouth every 8 hours as needed for Nausea/Vomiting.  Dispense: 30 Tab; Refill: 0        Follow-up: 1 week if no improvement, sooner as needed

## 2020-09-30 ENCOUNTER — APPOINTMENT (OUTPATIENT)
Dept: RADIOLOGY | Facility: MEDICAL CENTER | Age: 70
DRG: 315 | End: 2020-09-30
Attending: EMERGENCY MEDICINE
Payer: MEDICARE

## 2020-09-30 ENCOUNTER — HOSPITAL ENCOUNTER (INPATIENT)
Facility: MEDICAL CENTER | Age: 70
LOS: 3 days | DRG: 315 | End: 2020-10-03
Attending: EMERGENCY MEDICINE | Admitting: STUDENT IN AN ORGANIZED HEALTH CARE EDUCATION/TRAINING PROGRAM
Payer: MEDICARE

## 2020-09-30 DIAGNOSIS — R79.89 ELEVATED TROPONIN: ICD-10-CM

## 2020-09-30 DIAGNOSIS — R07.9 ACUTE CHEST PAIN: ICD-10-CM

## 2020-09-30 DIAGNOSIS — R79.89 ELEVATED BRAIN NATRIURETIC PEPTIDE (BNP) LEVEL: ICD-10-CM

## 2020-09-30 DIAGNOSIS — Z79.01 CHRONIC ANTICOAGULATION: ICD-10-CM

## 2020-09-30 DIAGNOSIS — Z87.438 HISTORY OF FOURNIER'S GANGRENE: ICD-10-CM

## 2020-09-30 PROBLEM — I50.22 CHRONIC SYSTOLIC HEART FAILURE (HCC): Chronic | Status: ACTIVE | Noted: 2017-03-13

## 2020-09-30 PROBLEM — I48.0 PAROXYSMAL A-FIB (HCC): Chronic | Status: ACTIVE | Noted: 2017-03-12

## 2020-09-30 PROBLEM — R94.31 QT PROLONGATION: Status: ACTIVE | Noted: 2020-09-30

## 2020-09-30 PROBLEM — Z79.4 TYPE 2 DIABETES MELLITUS, WITH LONG-TERM CURRENT USE OF INSULIN (HCC): Chronic | Status: ACTIVE | Noted: 2017-03-13

## 2020-09-30 PROBLEM — Z78.9 STATIN INTOLERANCE: Chronic | Status: ACTIVE | Noted: 2017-03-12

## 2020-09-30 PROBLEM — E11.9 TYPE 2 DIABETES MELLITUS, WITH LONG-TERM CURRENT USE OF INSULIN (HCC): Chronic | Status: ACTIVE | Noted: 2017-03-13

## 2020-09-30 PROBLEM — N49.3 FOURNIER'S GANGRENE OF SCROTUM: Chronic | Status: ACTIVE | Noted: 2020-07-25

## 2020-09-30 PROBLEM — I25.2 HISTORY OF ST ELEVATION MYOCARDIAL INFARCTION (STEMI): Chronic | Status: ACTIVE | Noted: 2017-03-17

## 2020-09-30 PROBLEM — N40.0 BENIGN PROSTATIC HYPERPLASIA WITHOUT LOWER URINARY TRACT SYMPTOMS: Chronic | Status: ACTIVE | Noted: 2017-03-12

## 2020-09-30 LAB
ALBUMIN SERPL BCP-MCNC: 2.7 G/DL (ref 3.2–4.9)
ALBUMIN/GLOB SERPL: 0.5 G/DL
ALP SERPL-CCNC: 412 U/L (ref 30–99)
ALT SERPL-CCNC: 13 U/L (ref 2–50)
ANION GAP SERPL CALC-SCNC: 13 MMOL/L (ref 7–16)
ANISOCYTOSIS BLD QL SMEAR: ABNORMAL
APTT PPP: 38.8 SEC (ref 24.7–36)
AST SERPL-CCNC: 19 U/L (ref 12–45)
BASOPHILS # BLD AUTO: 1 % (ref 0–1.8)
BASOPHILS # BLD: 0.1 K/UL (ref 0–0.12)
BILIRUB SERPL-MCNC: 0.9 MG/DL (ref 0.1–1.5)
BUN SERPL-MCNC: 24 MG/DL (ref 8–22)
CALCIUM SERPL-MCNC: 9.1 MG/DL (ref 8.4–10.2)
CHLORIDE SERPL-SCNC: 98 MMOL/L (ref 96–112)
CO2 SERPL-SCNC: 22 MMOL/L (ref 20–33)
COVID ORDER STATUS COVID19: NORMAL
CREAT SERPL-MCNC: 1.11 MG/DL (ref 0.5–1.4)
EKG IMPRESSION: NORMAL
EOSINOPHIL # BLD AUTO: 0 K/UL (ref 0–0.51)
EOSINOPHIL NFR BLD: 0 % (ref 0–6.9)
ERYTHROCYTE [DISTWIDTH] IN BLOOD BY AUTOMATED COUNT: 60 FL (ref 35.9–50)
GLOBULIN SER CALC-MCNC: 5.3 G/DL (ref 1.9–3.5)
GLUCOSE BLD-MCNC: 169 MG/DL (ref 65–99)
GLUCOSE SERPL-MCNC: 284 MG/DL (ref 65–99)
HCT VFR BLD AUTO: 37.9 % (ref 42–52)
HGB BLD-MCNC: 11.8 G/DL (ref 14–18)
INR PPP: 1.76 (ref 0.87–1.13)
LIPASE SERPL-CCNC: 16 U/L (ref 7–58)
LYMPHOCYTES # BLD AUTO: 0.1 K/UL (ref 1–4.8)
LYMPHOCYTES NFR BLD: 1 % (ref 22–41)
MANUAL DIFF BLD: NORMAL
MCH RBC QN AUTO: 25.2 PG (ref 27–33)
MCHC RBC AUTO-ENTMCNC: 31.1 G/DL (ref 33.7–35.3)
MCV RBC AUTO: 80.8 FL (ref 81.4–97.8)
MICROCYTES BLD QL SMEAR: ABNORMAL
MONOCYTES # BLD AUTO: 0.42 K/UL (ref 0–0.85)
MONOCYTES NFR BLD AUTO: 4 % (ref 0–13.4)
NEUTROPHILS # BLD AUTO: 9.78 K/UL (ref 1.82–7.42)
NEUTROPHILS NFR BLD: 94 % (ref 44–72)
NRBC # BLD AUTO: 0 K/UL
NRBC BLD-RTO: 0 /100 WBC
NT-PROBNP SERPL IA-MCNC: 5714 PG/ML (ref 0–125)
PLATELET # BLD AUTO: 386 K/UL (ref 164–446)
PLATELET BLD QL SMEAR: NORMAL
PMV BLD AUTO: 11.3 FL (ref 9–12.9)
POTASSIUM SERPL-SCNC: 4.2 MMOL/L (ref 3.6–5.5)
PROT SERPL-MCNC: 8 G/DL (ref 6–8.2)
PROTHROMBIN TIME: 20.1 SEC (ref 12–14.6)
RBC # BLD AUTO: 4.69 M/UL (ref 4.7–6.1)
RBC BLD AUTO: PRESENT
SARS-COV-2 RNA RESP QL NAA+PROBE: NOTDETECTED
SODIUM SERPL-SCNC: 133 MMOL/L (ref 135–145)
SPECIMEN SOURCE: NORMAL
TROPONIN T SERPL-MCNC: 19 NG/L (ref 6–19)
TROPONIN T SERPL-MCNC: 20 NG/L (ref 6–19)
TROPONIN T SERPL-MCNC: 20 NG/L (ref 6–19)
WBC # BLD AUTO: 10.4 K/UL (ref 4.8–10.8)

## 2020-09-30 PROCEDURE — 700105 HCHG RX REV CODE 258: Performed by: STUDENT IN AN ORGANIZED HEALTH CARE EDUCATION/TRAINING PROGRAM

## 2020-09-30 PROCEDURE — A9270 NON-COVERED ITEM OR SERVICE: HCPCS | Performed by: INTERNAL MEDICINE

## 2020-09-30 PROCEDURE — 99223 1ST HOSP IP/OBS HIGH 75: CPT | Mod: AI | Performed by: STUDENT IN AN ORGANIZED HEALTH CARE EDUCATION/TRAINING PROGRAM

## 2020-09-30 PROCEDURE — 87040 BLOOD CULTURE FOR BACTERIA: CPT | Mod: 91

## 2020-09-30 PROCEDURE — A9270 NON-COVERED ITEM OR SERVICE: HCPCS | Performed by: STUDENT IN AN ORGANIZED HEALTH CARE EDUCATION/TRAINING PROGRAM

## 2020-09-30 PROCEDURE — 700102 HCHG RX REV CODE 250 W/ 637 OVERRIDE(OP): Performed by: INTERNAL MEDICINE

## 2020-09-30 PROCEDURE — 83690 ASSAY OF LIPASE: CPT

## 2020-09-30 PROCEDURE — 85007 BL SMEAR W/DIFF WBC COUNT: CPT

## 2020-09-30 PROCEDURE — 99285 EMERGENCY DEPT VISIT HI MDM: CPT

## 2020-09-30 PROCEDURE — 770020 HCHG ROOM/CARE - TELE (206)

## 2020-09-30 PROCEDURE — 93005 ELECTROCARDIOGRAM TRACING: CPT

## 2020-09-30 PROCEDURE — A9270 NON-COVERED ITEM OR SERVICE: HCPCS | Performed by: HOSPITALIST

## 2020-09-30 PROCEDURE — 700111 HCHG RX REV CODE 636 W/ 250 OVERRIDE (IP): Performed by: EMERGENCY MEDICINE

## 2020-09-30 PROCEDURE — 36415 COLL VENOUS BLD VENIPUNCTURE: CPT

## 2020-09-30 PROCEDURE — 85730 THROMBOPLASTIN TIME PARTIAL: CPT

## 2020-09-30 PROCEDURE — 93005 ELECTROCARDIOGRAM TRACING: CPT | Performed by: EMERGENCY MEDICINE

## 2020-09-30 PROCEDURE — 87077 CULTURE AEROBIC IDENTIFY: CPT

## 2020-09-30 PROCEDURE — 96375 TX/PRO/DX INJ NEW DRUG ADDON: CPT

## 2020-09-30 PROCEDURE — 700102 HCHG RX REV CODE 250 W/ 637 OVERRIDE(OP): Performed by: STUDENT IN AN ORGANIZED HEALTH CARE EDUCATION/TRAINING PROGRAM

## 2020-09-30 PROCEDURE — 84484 ASSAY OF TROPONIN QUANT: CPT

## 2020-09-30 PROCEDURE — 96374 THER/PROPH/DIAG INJ IV PUSH: CPT

## 2020-09-30 PROCEDURE — 99221 1ST HOSP IP/OBS SF/LOW 40: CPT | Performed by: INTERNAL MEDICINE

## 2020-09-30 PROCEDURE — 82962 GLUCOSE BLOOD TEST: CPT

## 2020-09-30 PROCEDURE — 80053 COMPREHEN METABOLIC PANEL: CPT

## 2020-09-30 PROCEDURE — 700105 HCHG RX REV CODE 258: Performed by: EMERGENCY MEDICINE

## 2020-09-30 PROCEDURE — 85610 PROTHROMBIN TIME: CPT

## 2020-09-30 PROCEDURE — C9803 HOPD COVID-19 SPEC COLLECT: HCPCS | Performed by: STUDENT IN AN ORGANIZED HEALTH CARE EDUCATION/TRAINING PROGRAM

## 2020-09-30 PROCEDURE — 85027 COMPLETE CBC AUTOMATED: CPT

## 2020-09-30 PROCEDURE — U0003 INFECTIOUS AGENT DETECTION BY NUCLEIC ACID (DNA OR RNA); SEVERE ACUTE RESPIRATORY SYNDROME CORONAVIRUS 2 (SARS-COV-2) (CORONAVIRUS DISEASE [COVID-19]), AMPLIFIED PROBE TECHNIQUE, MAKING USE OF HIGH THROUGHPUT TECHNOLOGIES AS DESCRIBED BY CMS-2020-01-R: HCPCS

## 2020-09-30 PROCEDURE — 71045 X-RAY EXAM CHEST 1 VIEW: CPT

## 2020-09-30 PROCEDURE — 700102 HCHG RX REV CODE 250 W/ 637 OVERRIDE(OP): Performed by: HOSPITALIST

## 2020-09-30 PROCEDURE — 83880 ASSAY OF NATRIURETIC PEPTIDE: CPT

## 2020-09-30 PROCEDURE — 87186 SC STD MICRODIL/AGAR DIL: CPT

## 2020-09-30 RX ORDER — HYDROXYUREA 500 MG/1
500 CAPSULE ORAL EVERY MORNING
COMMUNITY

## 2020-09-30 RX ORDER — SODIUM CHLORIDE 9 MG/ML
500 INJECTION, SOLUTION INTRAVENOUS ONCE
Status: COMPLETED | OUTPATIENT
Start: 2020-09-30 | End: 2020-09-30

## 2020-09-30 RX ORDER — INSULIN GLARGINE 100 [IU]/ML
8 INJECTION, SOLUTION SUBCUTANEOUS EVERY EVENING
Status: DISCONTINUED | OUTPATIENT
Start: 2020-09-30 | End: 2020-10-02

## 2020-09-30 RX ORDER — POLYETHYLENE GLYCOL 3350 17 G/17G
1 POWDER, FOR SOLUTION ORAL
Status: DISCONTINUED | OUTPATIENT
Start: 2020-09-30 | End: 2020-10-03 | Stop reason: HOSPADM

## 2020-09-30 RX ORDER — ONDANSETRON 2 MG/ML
4 INJECTION INTRAMUSCULAR; INTRAVENOUS ONCE
Status: COMPLETED | OUTPATIENT
Start: 2020-09-30 | End: 2020-09-30

## 2020-09-30 RX ORDER — ACETAMINOPHEN 325 MG/1
650 TABLET ORAL EVERY 6 HOURS PRN
Status: DISCONTINUED | OUTPATIENT
Start: 2020-09-30 | End: 2020-10-03 | Stop reason: HOSPADM

## 2020-09-30 RX ORDER — HYDROXYZINE HYDROCHLORIDE 25 MG/1
25-50 TABLET, FILM COATED ORAL 3 TIMES DAILY PRN
Status: DISCONTINUED | OUTPATIENT
Start: 2020-09-30 | End: 2020-10-03 | Stop reason: HOSPADM

## 2020-09-30 RX ORDER — AMOXICILLIN 250 MG
2 CAPSULE ORAL 2 TIMES DAILY
Status: DISCONTINUED | OUTPATIENT
Start: 2020-09-30 | End: 2020-10-03 | Stop reason: HOSPADM

## 2020-09-30 RX ORDER — TAMSULOSIN HYDROCHLORIDE 0.4 MG/1
0.8 CAPSULE ORAL DAILY
Status: DISCONTINUED | OUTPATIENT
Start: 2020-09-30 | End: 2020-10-01

## 2020-09-30 RX ORDER — PROCHLORPERAZINE MALEATE 10 MG
10 TABLET ORAL EVERY 6 HOURS PRN
Status: DISCONTINUED | OUTPATIENT
Start: 2020-09-30 | End: 2020-10-03 | Stop reason: HOSPADM

## 2020-09-30 RX ORDER — M-VIT,TX,IRON,MINS/CALC/FOLIC 27MG-0.4MG
1 TABLET ORAL DAILY
Status: DISCONTINUED | OUTPATIENT
Start: 2020-09-30 | End: 2020-10-03 | Stop reason: HOSPADM

## 2020-09-30 RX ORDER — LISINOPRIL 2.5 MG/1
2.5 TABLET ORAL
Status: DISCONTINUED | OUTPATIENT
Start: 2020-10-01 | End: 2020-10-03 | Stop reason: HOSPADM

## 2020-09-30 RX ORDER — HYDROXYUREA 500 MG/1
500 CAPSULE ORAL EVERY EVENING
Status: DISCONTINUED | OUTPATIENT
Start: 2020-09-30 | End: 2020-10-03 | Stop reason: HOSPADM

## 2020-09-30 RX ORDER — GABAPENTIN 300 MG/1
600 CAPSULE ORAL 3 TIMES DAILY
Status: DISCONTINUED | OUTPATIENT
Start: 2020-09-30 | End: 2020-10-01

## 2020-09-30 RX ORDER — BISACODYL 10 MG
10 SUPPOSITORY, RECTAL RECTAL
Status: DISCONTINUED | OUTPATIENT
Start: 2020-09-30 | End: 2020-10-03 | Stop reason: HOSPADM

## 2020-09-30 RX ORDER — LORAZEPAM 2 MG/ML
0.5 INJECTION INTRAMUSCULAR EVERY 4 HOURS PRN
Status: DISCONTINUED | OUTPATIENT
Start: 2020-09-30 | End: 2020-10-03 | Stop reason: HOSPADM

## 2020-09-30 RX ORDER — AZATHIOPRINE 50 MG/1
50 TABLET ORAL 2 TIMES DAILY
Status: DISCONTINUED | OUTPATIENT
Start: 2020-09-30 | End: 2020-10-03 | Stop reason: HOSPADM

## 2020-09-30 RX ORDER — SODIUM CHLORIDE 9 MG/ML
1000 INJECTION, SOLUTION INTRAVENOUS ONCE
Status: COMPLETED | OUTPATIENT
Start: 2020-09-30 | End: 2020-09-30

## 2020-09-30 RX ORDER — LORAZEPAM 0.5 MG/1
0.5 TABLET ORAL EVERY 4 HOURS PRN
Status: DISCONTINUED | OUTPATIENT
Start: 2020-09-30 | End: 2020-10-03 | Stop reason: HOSPADM

## 2020-09-30 RX ORDER — NAPROXEN 250 MG/1
500-750 TABLET ORAL 2 TIMES DAILY PRN
Status: DISCONTINUED | OUTPATIENT
Start: 2020-09-30 | End: 2020-10-03 | Stop reason: HOSPADM

## 2020-09-30 RX ORDER — HYDROMORPHONE HYDROCHLORIDE 1 MG/ML
1 INJECTION, SOLUTION INTRAMUSCULAR; INTRAVENOUS; SUBCUTANEOUS ONCE
Status: COMPLETED | OUTPATIENT
Start: 2020-09-30 | End: 2020-09-30

## 2020-09-30 RX ORDER — PROCHLORPERAZINE EDISYLATE 5 MG/ML
10 INJECTION INTRAMUSCULAR; INTRAVENOUS ONCE
Status: COMPLETED | OUTPATIENT
Start: 2020-09-30 | End: 2020-09-30

## 2020-09-30 RX ORDER — NYSTATIN 100000 U/G
CREAM TOPICAL 2 TIMES DAILY
Status: DISCONTINUED | OUTPATIENT
Start: 2020-09-30 | End: 2020-10-03 | Stop reason: HOSPADM

## 2020-09-30 RX ORDER — FINASTERIDE 5 MG/1
5 TABLET, FILM COATED ORAL EVERY EVENING
Status: DISCONTINUED | OUTPATIENT
Start: 2020-09-30 | End: 2020-10-03 | Stop reason: HOSPADM

## 2020-09-30 RX ORDER — OXYCODONE HYDROCHLORIDE 5 MG/1
5 TABLET ORAL EVERY 4 HOURS PRN
Status: DISCONTINUED | OUTPATIENT
Start: 2020-09-30 | End: 2020-10-03 | Stop reason: HOSPADM

## 2020-09-30 RX ADMIN — HYDROXYUREA 500 MG: 500 CAPSULE ORAL at 22:00

## 2020-09-30 RX ADMIN — GABAPENTIN 600 MG: 300 CAPSULE ORAL at 21:57

## 2020-09-30 RX ADMIN — HYDROMORPHONE HYDROCHLORIDE 1 MG: 1 INJECTION, SOLUTION INTRAMUSCULAR; INTRAVENOUS; SUBCUTANEOUS at 09:10

## 2020-09-30 RX ADMIN — PROCHLORPERAZINE EDISYLATE 10 MG: 5 INJECTION INTRAMUSCULAR; INTRAVENOUS at 12:29

## 2020-09-30 RX ADMIN — OXYCODONE HYDROCHLORIDE 5 MG: 5 TABLET ORAL at 14:07

## 2020-09-30 RX ADMIN — SODIUM CHLORIDE 500 ML: 9 INJECTION, SOLUTION INTRAVENOUS at 22:12

## 2020-09-30 RX ADMIN — NAPROXEN 750 MG: 250 TABLET ORAL at 23:59

## 2020-09-30 RX ADMIN — SODIUM CHLORIDE 1000 ML: 9 INJECTION, SOLUTION INTRAVENOUS at 09:09

## 2020-09-30 RX ADMIN — OXYCODONE HYDROCHLORIDE 5 MG: 5 TABLET ORAL at 20:08

## 2020-09-30 RX ADMIN — ONDANSETRON 4 MG: 2 INJECTION INTRAMUSCULAR; INTRAVENOUS at 09:10

## 2020-09-30 RX ADMIN — PROCHLORPERAZINE MALEATE 10 MG: 10 TABLET ORAL at 18:38

## 2020-09-30 RX ADMIN — BENZOCAINE AND MENTHOL, UNSPECIFIED FORM 1 LOZENGE: 15; 3.6 LOZENGE ORAL at 21:58

## 2020-09-30 RX ADMIN — FINASTERIDE 5 MG: 5 TABLET, FILM COATED ORAL at 21:58

## 2020-09-30 RX ADMIN — APIXABAN 5 MG: 5 TABLET, FILM COATED ORAL at 21:58

## 2020-09-30 ASSESSMENT — PAIN DESCRIPTION - PAIN TYPE
TYPE: ACUTE PAIN
TYPE: ACUTE PAIN

## 2020-09-30 ASSESSMENT — ENCOUNTER SYMPTOMS
MYALGIAS: 0
DIARRHEA: 0
COUGH: 1
WHEEZING: 0
DIZZINESS: 1
SINUS PAIN: 0
DOUBLE VISION: 0
SORE THROAT: 0
FEVER: 0
FOCAL WEAKNESS: 0
SHORTNESS OF BREATH: 1
NERVOUS/ANXIOUS: 0
NAUSEA: 1
PND: 0
CHILLS: 1
ABDOMINAL PAIN: 1
HEADACHES: 0
BLURRED VISION: 0
ORTHOPNEA: 1
PALPITATIONS: 0
CONSTIPATION: 0
HEMOPTYSIS: 0
VOMITING: 0
SPUTUM PRODUCTION: 0

## 2020-09-30 ASSESSMENT — LIFESTYLE VARIABLES
CONSUMPTION TOTAL: NEGATIVE
ALCOHOL_USE: NO
ON A TYPICAL DAY WHEN YOU DRINK ALCOHOL HOW MANY DRINKS DO YOU HAVE: 0
EVER FELT BAD OR GUILTY ABOUT YOUR DRINKING: NO
EVER HAD A DRINK FIRST THING IN THE MORNING TO STEADY YOUR NERVES TO GET RID OF A HANGOVER: NO
HOW MANY TIMES IN THE PAST YEAR HAVE YOU HAD 5 OR MORE DRINKS IN A DAY: 0
DO YOU DRINK ALCOHOL: NO
HAVE YOU EVER FELT YOU SHOULD CUT DOWN ON YOUR DRINKING: NO
AVERAGE NUMBER OF DAYS PER WEEK YOU HAVE A DRINK CONTAINING ALCOHOL: 0
TOTAL SCORE: 0
HAVE PEOPLE ANNOYED YOU BY CRITICIZING YOUR DRINKING: NO
TOTAL SCORE: 0
TOTAL SCORE: 0

## 2020-09-30 ASSESSMENT — COGNITIVE AND FUNCTIONAL STATUS - GENERAL
MOBILITY SCORE: 21
STANDING UP FROM CHAIR USING ARMS: A LITTLE
CLIMB 3 TO 5 STEPS WITH RAILING: A LITTLE
SUGGESTED CMS G CODE MODIFIER DAILY ACTIVITY: CH
SUGGESTED CMS G CODE MODIFIER MOBILITY: CJ
WALKING IN HOSPITAL ROOM: A LITTLE
DAILY ACTIVITIY SCORE: 24

## 2020-09-30 ASSESSMENT — FIBROSIS 4 INDEX
FIB4 SCORE: 1.76
FIB4 SCORE: 0.93

## 2020-09-30 ASSESSMENT — CHA2DS2 SCORE
CHA2DS2 VASC SCORE: 7
HYPERTENSION: YES
SEX: MALE
AGE 75 OR GREATER: NO
DIABETES: YES
PRIOR STROKE OR TIA OR THROMBOEMBOLISM: YES
VASCULAR DISEASE: YES
CHF OR LEFT VENTRICULAR DYSFUNCTION: YES
AGE 65 TO 74: YES

## 2020-09-30 NOTE — ED PROVIDER NOTES
ED Provider Note    CHIEF COMPLAINT  Chief Complaint   Patient presents with   • Chest Pain     started last NOC        HPI  Francesco Schulte is a 70 y.o. male who presents to the emergency department the chief complaint of chest pain.  The patient says that he developed chest pain last night.  Pain is on the left side of his chest.  He says that it reminds him of his previous 3 heart attacks.  Describes as fairly sharp and severe.  Radiates locally.  No significant alleviating or exacerbating factors.  Mild shortness of breath.  No vomiting.  No diaphoresis.  No cough.  No cold.  No runny nose.  He has a complex past medical history including recent debridement for Landon's gangrene.  He is currently on Eliquis and he has been taking it as scheduled.    REVIEW OF SYSTEMS  See HPI for further details. All other systems are negative.     PAST MEDICAL HISTORY  Past Medical History:   Diagnosis Date   • Anesthesia     resistant to pain meds   • Cancer (HCC)     polycythemia vera   • Diabetes (HCC)     insulin and oral meds   • Family history of punctured lung 2002    left lung   • Heart attack (HCC) 03/2017   • Hemorrhagic disorder (HCC)     on blood thinners   • High cholesterol    • Ischemic cardiomyopathy    • Kidney stones     hx of kidney stones   • Orthostatic hypotension 3/29/2018   • Pain     headache pain   • Polycythemia vera(238.4)    • Stroke (HCC) 2016    r/t afib; eye issues resolved afterward   • Urinary bladder disorder     enlarged prostate, weak stream, difficulty urinating   • Vertigo        FAMILY HISTORY  History reviewed. No pertinent family history.    SOCIAL HISTORY  Social History     Socioeconomic History   • Marital status:      Spouse name: Not on file   • Number of children: Not on file   • Years of education: Not on file   • Highest education level: Not on file   Occupational History   • Not on file   Social Needs   • Financial resource strain: Not on file   • Food  insecurity     Worry: Not on file     Inability: Not on file   • Transportation needs     Medical: Not on file     Non-medical: Not on file   Tobacco Use   • Smoking status: Never Smoker   • Smokeless tobacco: Never Used   Substance and Sexual Activity   • Alcohol use: No   • Drug use: No   • Sexual activity: Not on file   Lifestyle   • Physical activity     Days per week: Not on file     Minutes per session: Not on file   • Stress: Not on file   Relationships   • Social connections     Talks on phone: Not on file     Gets together: Not on file     Attends Congregation service: Not on file     Active member of club or organization: Not on file     Attends meetings of clubs or organizations: Not on file     Relationship status: Not on file   • Intimate partner violence     Fear of current or ex partner: Not on file     Emotionally abused: Not on file     Physically abused: Not on file     Forced sexual activity: Not on file   Other Topics Concern   • Not on file   Social History Narrative   • Not on file       SURGICAL HISTORY  Past Surgical History:   Procedure Laterality Date   • SPLIT THICKNESS SKIN GRAFT N/A 8/23/2020    Procedure: APPLICATION, GRAFT, SKIN, SPLIT-THICKNESS;  Surgeon: Elisa Craig M.D.;  Location: [facility];  Service: Plastics   • SKIN ABSCESS INCISION AND DRAINAGE Right 8/3/2020    Procedure: INCISION AND DRAINAGE - SCROTAL WOUND - WOUND VAC PLACEMENT;  Surgeon: Jaylen Hayes M.D.;  Location: [facility];  Service: Urology   • PB EXPLORE SCROTUM Right 7/31/2020    Procedure: EXPLORATION, SCROTUM - FOR WASH OUT OF SCROTAL WOUND & WOUND VAC PLACEMENT;  Surgeon: Ferny Rosales M.D.;  Location: [facility];  Service: Urology   • PB EXPLORE SCROTUM Right 7/29/2020    Procedure: EXPLORATION, SCROTUM - FOR I & D OF SCROTAL AND  GROIN WOUND;  Surgeon: Donta Viera M.D.;  Location: [facility];  Service: Urology   • PB INCIS/DRAIN  "SCROTUM/TESTIS,EPIDIDYM Right 7/25/2020    Procedure: INCISION AND DRAINAGE, SCROTUM;  Surgeon: Nick Negro M.D.;  Location: SURGERY [facility];  Service: Urology   • TEMPORAL ARTERY BIOPSY Right 6/26/2018    Procedure: TEMPORAL ARTERY BIOPSY;  Surgeon: Rajendra Aguilar M.D.;  Location: SURGERY SAME DAY [facility];  Service: General   • CAROTID ENDARTERECTOMY Right 3/21/2018    Procedure: CAROTID ENDARTERECTOMY- W/EEG MONITORING;  Surgeon: Benny Morfin M.D.;  Location: SURGERY [facility];  Service: General   • APPENDECTOMY     • CATH PLACEMENT      right arm   • LAMINOTOMY      diskectomy; C4   • OTHER CARDIAC SURGERY      stents x 3       CURRENT MEDICATIONS  Home Medications     Reviewed by Usman Zacarias (Pharmacy Tech) on 09/30/20 at 0906  Med List Status: Complete   Medication Last Dose Status   apixaban (ELIQUIS) 5mg Tab 9/29/2020 Active   aspirin EC (ECOTRIN) 81 MG Tablet Delayed Response 9/29/2020 Active   azaTHIOprine (IMURAN) 50 MG Tab 9/29/2020 Active   finasteride (PROSCAR) 5 MG Tab 9/29/2020 Active   gabapentin (NEURONTIN) 300 MG Cap > 1 week Active   hydroxyurea (HYDREA) 500 MG Cap 9/29/2020 Active   hydrOXYzine HCl (ATARAX) 25 MG Tab > 2 weeks Active   Insulin Degludec (TRESIBA FLEXTOUCH) 200 UNIT/ML Solution Pen-injector 9/29/2020 Active   naproxen (NAPROSYN) 250 MG Tab 9/29/2020 Active   ondansetron (ZOFRAN) 8 MG Tab > 2 days Active   tamsulosin (FLOMAX) 0.4 MG capsule 9/29/2020 Active   therapeutic multivitamin-minerals (THERAGRAN-M) Tab 9/29/2020 Active                ALLERGIES  Allergies   Allergen Reactions   • Doxycycline      Swelling lips and difficulties swallowing   • Beta Adrenergic Blockers Unspecified     dizziness   • Metformin Unspecified and Palpitations     Cardiac effects  \"Similar to a heart attack, I was hospitalized\"   • Simvastatin      Other reaction(s): Other (Specify with Comments)  Myalgias  Myalgias   • Statins [Hmg-Coa-R Inhibitors]      " "Muscle ache, joint pain       PHYSICAL EXAM  VITAL SIGNS: /76   Pulse 73   Temp 36.7 °C (98 °F)   Resp 13   Ht 1.905 m (6' 3\")   Wt 83 kg (182 lb 15.7 oz)   SpO2 96%   BMI 22.87 kg/m²    Constitutional: Well developed, Well nourished, No acute distress, Non-toxic appearance.   HENT: Normocephalic, Atraumatic, Bilateral external ears normal, Oropharynx is dry, No oral exudates, Nose normal.   Eyes: PERRLA, EOMI, Conjunctiva normal, No discharge.   Neck: Normal range of motion, No tenderness, Supple, No stridor.   Cardiovascular: Regular rate and rhythm, no audible murmur  Thorax & Lungs: Clear to auscultation bilaterally.  No rales, rhonchi, or wheezing.  Abdomen: Bowel sounds normal, Soft, No tenderness, No masses, No pulsatile masses.   : Perineum exam reveals that the patient is status post resection of Landon's gangrene, he has open wounds in the perineum, there is no significant surrounding erythema, no purulent discharge.  Very tender to palpation but appears to be healing well.  Skin: Warm, Dry, No erythema, No rash.   Back: No tenderness, No CVA tenderness.   Extremities: Intact distal pulses, No tenderness, No cyanosis, No clubbing.  No edema.  Neurologic: Alert & oriented x 3, Normal motor function, Normal sensory function, No focal deficits noted.     EKG  Interpreted by me.  See below.    RADIOLOGY/PROCEDURES  DX-CHEST-PORTABLE (1 VIEW)   Final Result         1. No acute cardiopulmonary abnormalities are identified.        Results for orders placed or performed during the hospital encounter of 09/30/20   CBC WITH DIFFERENTIAL   Result Value Ref Range    WBC 10.4 4.8 - 10.8 K/uL    RBC 4.69 (L) 4.70 - 6.10 M/uL    Hemoglobin 11.8 (L) 14.0 - 18.0 g/dL    Hematocrit 37.9 (L) 42.0 - 52.0 %    MCV 80.8 (L) 81.4 - 97.8 fL    MCH 25.2 (L) 27.0 - 33.0 pg    MCHC 31.1 (L) 33.7 - 35.3 g/dL    RDW 60.0 (H) 35.9 - 50.0 fL    Platelet Count 386 164 - 446 K/uL    MPV 11.3 9.0 - 12.9 fL    " Neutrophils-Polys 94.00 (H) 44.00 - 72.00 %    Lymphocytes 1.00 (L) 22.00 - 41.00 %    Monocytes 4.00 0.00 - 13.40 %    Eosinophils 0.00 0.00 - 6.90 %    Basophils 1.00 0.00 - 1.80 %    Nucleated RBC 0.00 /100 WBC    Neutrophils (Absolute) 9.78 (H) 1.82 - 7.42 K/uL    Lymphs (Absolute) 0.10 (L) 1.00 - 4.80 K/uL    Monos (Absolute) 0.42 0.00 - 0.85 K/uL    Eos (Absolute) 0.00 0.00 - 0.51 K/uL    Baso (Absolute) 0.10 0.00 - 0.12 K/uL    NRBC (Absolute) 0.00 K/uL    Anisocytosis 1+     Microcytosis 1+    COMP METABOLIC PANEL   Result Value Ref Range    Sodium 133 (L) 135 - 145 mmol/L    Potassium 4.2 3.6 - 5.5 mmol/L    Chloride 98 96 - 112 mmol/L    Co2 22 20 - 33 mmol/L    Anion Gap 13.0 7.0 - 16.0    Glucose 284 (H) 65 - 99 mg/dL    Bun 24 (H) 8 - 22 mg/dL    Creatinine 1.11 0.50 - 1.40 mg/dL    Calcium 9.1 8.4 - 10.2 mg/dL    AST(SGOT) 19 12 - 45 U/L    ALT(SGPT) 13 2 - 50 U/L    Alkaline Phosphatase 412 (H) 30 - 99 U/L    Total Bilirubin 0.9 0.1 - 1.5 mg/dL    Albumin 2.7 (L) 3.2 - 4.9 g/dL    Total Protein 8.0 6.0 - 8.2 g/dL    Globulin 5.3 (H) 1.9 - 3.5 g/dL    A-G Ratio 0.5 g/dL   LIPASE   Result Value Ref Range    Lipase 16 7 - 58 U/L   TROPONIN   Result Value Ref Range    Troponin T 20 (H) 6 - 19 ng/L   proBrain Natriuretic Peptide, NT   Result Value Ref Range    NT-proBNP 5714 (H) 0 - 125 pg/mL   APTT   Result Value Ref Range    APTT 38.8 (H) 24.7 - 36.0 sec   PROTHROMBIN TIME (INR)   Result Value Ref Range    PT 20.1 (H) 12.0 - 14.6 sec    INR 1.76 (H) 0.87 - 1.13   DIFFERENTIAL MANUAL   Result Value Ref Range    Manual Diff Status PERFORMED    PLATELET ESTIMATE   Result Value Ref Range    Plt Estimation Normal    MORPHOLOGY   Result Value Ref Range    RBC Morphology Present    ESTIMATED GFR   Result Value Ref Range    GFR If African American >60 >60 mL/min/1.73 m 2    GFR If Non African American >60 >60 mL/min/1.73 m 2   Routine (COVID/SARS COV-2 In-House PCR up to 24 hours)    Specimen: Nasopharyngeal;  Respirate   Result Value Ref Range    COVID Order Status Received    EKG   Result Value Ref Range    Report       [facility] Emergency Dept.    Test Date:  2020  Pt Name:    MARZENA ROMEO             Department: [facility]  MRN:        [id number]                      [facility]  Gender:     Male                         Technician: [id number]  :        1950                   Requested By:ER TRIAGE PROTOCOL  Order #:    [id number]                    Reading MD: STEPHANIE SMITH MD    Measurements  Intervals                                Axis  Rate:       90                           P:          39  WI:         176                          QRS:        -35  QRSD:       116                          T:          117  QT:         388  QTc:        475    Interpretive Statements  SINUS RHYTHM  INCOMPLETE LEFT BUNDLE BRANCH BLOCK  ANTERIOR Q WAVES, POSSIBLY DUE TO ILBBB  Compared to ECG 2020 13:53:24  No significant changes  Electronically Signed On 2020 9:53:46 PDT by STEPHANIE SMITH MD           COURSE & MEDICAL DECISION MAKING  Pertinent Labs & Imaging studies reviewed. (See chart for details)    Patient presents today with chest pain.  He has significant cardiac history, history of multiple heart attacks in the past and stent placement.  Returns today with pain that reminds him of similar symptoms.    EKG does not demonstrate evidence of acute ischemia but is abnormal with incomplete left bundle branch block.    An IV is established.  Laboratory studies are obtained.    Laboratory studies are remarkable for an elevated troponin just over the upper limit of normal.  BNP is elevated.  Patient was treated with IV fluid in the emergency department as he appears to be clinically volume depleted but clearly his BNP is elevated.    Given the patient's age, risk factors, comorbidities he will be admitted to the hospital for further evaluation.  I spoke with the on-call  hospitalist for admission.    Patient has a heart score of 8       FINAL IMPRESSION  1. Acute chest pain    2. Elevated brain natriuretic peptide (BNP) level    3. History of Landon's gangrene    4. Chronic anticoagulation    5. Elevated troponin              Electronically signed by: Diaz Avery M.D., 9/30/2020 12:22 PM

## 2020-09-30 NOTE — ASSESSMENT & PLAN NOTE
- Complicated cardiac issues with history of extensive CAD with POBA and stenting  - Continue aspirin and Eliquis, not on any other cardiac meds  - Troponins elevated but stable as expected with chronic heart failure   - Resolved   - Pericardial effusion is small on echo, no s/s tamponade

## 2020-09-30 NOTE — H&P
Hospital Medicine History & Physical Note    Date of Service  9/30/2020    Primary Care Physician  ALVINO Harman    Consultants  Cardiology  Wound Care    Code Status  Full Code    Chief Complaint  Chief Complaint   Patient presents with   • Chest Pain     started last NOC        History of Presenting Illness  A 70 y.o. 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  The patient woke up with the pain at 3 AM, compressing chest pain, intermittent, 6 /10, no radiation, associated with nausea and shortness of breath.  He has pain from Landon's gangrene incision and drainage site and also he used to have pain in his abdomen.  So he cannot differentiate the pain is whether  from his heart or from I&D site.  Patient has poor appetite and also complained of generalized weakness and dehydration.   Upon chart review patient is not taking any cardiac medications except aspirin and Eliquis.  Patient cannot tolerate statin.     At ER found to have an elevated troponin and BNP.  Patient got 1 L IV fluid in the ER patient seems very dehydrated and 1 dose of IV Dilaudid and IV Zofran.     CXR: Nil acute   ECHO on 8/2020: 30% EF , Moderately reduced left ventricular systolic function, Grade I diastolic dysfunction  EKG today: SR , left bundle branch block ,  No new EKG changes compared to previous EKGs    Stress test 1/2019:    Severe left ventriular dilation at rest.  Chronic left bundle branch block at rest.Extensive, large prior inferior infarct.   No evidence of ischemia.   Global hypokinesis with resting LVEF 20-30%.    Pt was Last seen by cardiology on 8/24/2020 while inpatient: Used to take  midodrine for hypotension.  Midodrine was stopped and he is on low-dose cardiac therapy, cardiology is considering ICD outpatient.       Review of Systems  Review of Systems   Constitutional: Positive for chills and malaise/fatigue. Negative for fever.   HENT: Negative for congestion, ear discharge, ear pain, sinus pain and sore throat.    Eyes: Negative for blurred vision and double vision.   Respiratory: Positive for cough and shortness of breath. Negative for hemoptysis, sputum production and wheezing.    Cardiovascular: Positive for chest pain and orthopnea. Negative for palpitations, leg swelling and PND.   Gastrointestinal: Positive for abdominal pain and nausea. Negative for constipation, diarrhea and vomiting.   Genitourinary: Negative for dysuria, frequency and urgency.        Pain in the perineal area from Landon's gangrene incision and drainage site   Musculoskeletal: Negative for myalgias.   Neurological: Positive for dizziness. Negative for focal weakness and headaches.        Generalized weakness   Psychiatric/Behavioral: The patient is not nervous/anxious.        Past Medical History   has a past medical history of Anesthesia, Cancer ([facility]), Diabetes ([facility]), Family history of punctured lung (2002), Heart attack ([facility]) (03/2017), Hemorrhagic disorder ([facility]), High cholesterol, Ischemic cardiomyopathy, Kidney stones, Orthostatic hypotension (3/29/2018), Pain, Polycythemia vera(238.4), Stroke ([facility]) (2016), Urinary bladder disorder, and Vertigo.    Surgical History   has a past surgical history that includes other cardiac surgery; cath placement; laminotomy; appendectomy; carotid endarterectomy (Right, 3/21/2018); temporal artery biopsy (Right, 6/26/2018); pr incis/drain scrotum/testis,epididym (Right, 7/25/2020); pr explore scrotum (Right, 7/29/2020); pr explore scrotum (Right, 7/31/2020); skin abscess incision and drainage (Right, 8/3/2020); and split thickness skin graft (N/A, 8/23/2020).     Family  "History  family history is not on file.     Social History   reports that he has never smoked. He has never used smokeless tobacco. He reports that he does not drink alcohol or use drugs.    Allergies  Allergies   Allergen Reactions   • Doxycycline      Swelling lips and difficulties swallowing   • Beta Adrenergic Blockers Unspecified     dizziness   • Metformin Unspecified and Palpitations     Cardiac effects  \"Similar to a heart attack, I was hospitalized\"   • Simvastatin      Other reaction(s): Other (Specify with Comments)  Myalgias  Myalgias   • Statins [Hmg-Coa-R Inhibitors]      Muscle ache, joint pain       Medications  Prior to Admission Medications   Prescriptions Last Dose Informant Patient Reported? Taking?   Insulin Degludec (TRESIBA FLEXTOUCH) 200 UNIT/ML Solution Pen-injector 9/29/2020 at 2100 Patient Yes No   Sig: Inject 8 Units as instructed every evening.   apixaban (ELIQUIS) 5mg Tab 9/29/2020 at 2100 Patient No No   Sig: Take 1 Tab by mouth 2 Times a Day.   aspirin EC (ECOTRIN) 81 MG Tablet Delayed Response 9/29/2020 at 0800 Patient Yes No   Sig: Take 1 Tab by mouth every day.   azaTHIOprine (IMURAN) 50 MG Tab 9/29/2020 at 2100 Patient Yes No   Sig: Take 50 mg by mouth 2 Times a Day. TAKE 1 TABLET BY MOUTH TWICE A DAY   finasteride (PROSCAR) 5 MG Tab 9/29/2020 at 2100 Patient Yes No   Sig: Take 5 mg by mouth every evening.   gabapentin (NEURONTIN) 300 MG Cap > 1 week at STOPPED Patient No No   Sig: Take 2 Caps by mouth 3 times a day.   hydrOXYzine HCl (ATARAX) 25 MG Tab > 2 weeks at unknown Patient No No   Sig: Take 1-2 Tabs by mouth 3 times a day as needed for Anxiety (sleep).   hydroxyurea (HYDREA) 500 MG Cap 9/29/2020 at 2100 Patient Yes Yes   Sig: Take 500 mg by mouth every evening. Pt started on 8/31/2020 for 30 day course   naproxen (NAPROSYN) 250 MG Tab 9/29/2020 at 0800 Patient Yes No   Sig: Take 500-750 mg by mouth 2 times a day as needed. Indications: Pain   ondansetron (ZOFRAN) 8 MG Tab " > 2 days at Unknown Patient No No   Sig: Take 1 Tab by mouth every 8 hours as needed for Nausea/Vomiting.   tamsulosin (FLOMAX) 0.4 MG capsule 9/29/2020 at 0800 Patient No No   Sig: Take 2 Caps by mouth every day.   therapeutic multivitamin-minerals (THERAGRAN-M) Tab 9/29/2020 at 0800 Patient Yes No   Sig: Take 1 Tab by mouth every day.      Facility-Administered Medications: None       Physical Exam  Temp:  [36.7 °C (98 °F)] 36.7 °C (98 °F)  Pulse:  [73-89] 73  Resp:  [13-21] 13  BP: (120-147)/(76-86) 120/76  SpO2:  [96 %-98 %] 96 %    Physical Exam  Constitutional:       General: He is not in acute distress.     Appearance: He is ill-appearing.   HENT:      Head: Normocephalic and atraumatic.      Nose: Nose normal.      Mouth/Throat:      Mouth: Mucous membranes are dry.      Pharynx: No posterior oropharyngeal erythema.   Eyes:      General: No scleral icterus.     Extraocular Movements: Extraocular movements intact.      Conjunctiva/sclera: Conjunctivae normal.      Pupils: Pupils are equal, round, and reactive to light.   Neck:      Musculoskeletal: Normal range of motion and neck supple. No neck rigidity.   Cardiovascular:      Rate and Rhythm: Normal rate. Rhythm irregular.      Pulses: Normal pulses.      Heart sounds: No murmur. No gallop.    Pulmonary:      Effort: Pulmonary effort is normal.      Breath sounds: Normal breath sounds. No stridor. No wheezing, rhonchi or rales.   Abdominal:      General: Bowel sounds are normal.      Palpations: Abdomen is soft.   Genitourinary:     Comments: Incision and drainage sites from Landon's gangrene looks healing well , no erythema, no discharge  Musculoskeletal:         General: No swelling or tenderness.   Skin:     General: Skin is warm.   Neurological:      General: No focal deficit present.      Mental Status: He is alert and oriented to person, place, and time.   Psychiatric:         Mood and Affect: Mood normal.         Behavior: Behavior normal.          Laboratory:  Recent Labs     09/30/20  0824   WBC 10.4   RBC 4.69*   HEMOGLOBIN 11.8*   HEMATOCRIT 37.9*   MCV 80.8*   MCH 25.2*   MCHC 31.1*   RDW 60.0*   PLATELETCT 386   MPV 11.3     Recent Labs     09/30/20  0824   SODIUM 133*   POTASSIUM 4.2   CHLORIDE 98   CO2 22   GLUCOSE 284*   BUN 24*   CREATININE 1.11   CALCIUM 9.1     Recent Labs     09/30/20  0824   ALTSGPT 13   ASTSGOT 19   ALKPHOSPHAT 412*   TBILIRUBIN 0.9   LIPASE 16   GLUCOSE 284*     Recent Labs     09/30/20  0824   APTT 38.8*   INR 1.76*     Recent Labs     09/30/20  0824   NTPROBNP 5714*         Recent Labs     09/30/20  0824   TROPONINT 20*       Imaging:  DX-CHEST-PORTABLE (1 VIEW)   Final Result         1. No acute cardiopulmonary abnormalities are identified.            Assessment/Plan:  I anticipate this patient will require at least two midnights for appropriate medical management, necessitating inpatient admission.    * Pain in the chest  Assessment & Plan  Came in with left-sided chest pain  Complicated cardiac issues  Patient is on aspirin and Eliquis, not on any other cardiac meds  Elevated troponin BNP    Telemetry monitor  Daily weights I's and O's, cardiology consulted   will trend troponin    History of ST elevation myocardial infarction (STEMI)- (present on admission)  Assessment & Plan  According to the patient for ischemic cardia attacks in the past.   See A&P for chronic systolic heart failure      Chronic systolic heart failure (HCC)- (present on admission)  Assessment & Plan  ECHO on 8/2020: 30% EF  EKG today: SR , left bundle branch block ,  No new EKG changes compared to previous EKGs  Stress test 1/2019:    Severe left ventriular dilation at rest.  Chronic left bundle branch block at rest.Extensive, large prior inferior infarct.   No evidence of ischemia.   Global hypokinesis with resting LVEF 20-30%.    Pt was Last seen by cardiology on 8/24/2020: Used to take midodrine for hypotension.  Midodrine was stopped  and he is on low-dose statin therapy, cardiology is considering ICD outpatient.         On aspirin  A. 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)  Assessment & Plan  Status post incision and drainage & prolonged hospital stay with long IV antibiotic course completed     Wound care consulted  Pain control    Type 2 diabetes mellitus, with long-term current use of insulin ([facility])- (present on admission)  Assessment & Plan  Last A1c 8.6  Continue home insulin therapy    Paroxysmal A-fib (HCC)- (present on admission)  Assessment & Plan  Telemetry monitoring   continue home Eliquis    QT prolongation  Assessment & Plan   on EKG  Avoid QTC prolonging medications  Use anti emetics with extra cautions    Statin intolerance- (present on admission)  Assessment & Plan  Patient and his wife stated that patient has statin intolerance    Benign prostatic hyperplasia without lower urinary tract symptoms- (present on admission)  Assessment & Plan  Continue home Flomax and Proscar  Straight cath at home  Andres ordered

## 2020-09-30 NOTE — ASSESSMENT & PLAN NOTE
According to the patient for ischemic cardia attacks in the past.   See A&P for chronic systolic heart failure

## 2020-09-30 NOTE — ED NOTES
Med rec updated and complete  Allergies reviewed  Interviewed pt with wife at bedside with permission from pt.  Pt reports that he stopped taking his GABAPENTIN 300MG about a week ago.  Pt reports no antibiotics in the last 2 weeks

## 2020-09-30 NOTE — ED TRIAGE NOTES
Chief Complaint   Patient presents with   • Chest Pain     started last NOC      /86   Pulse 89   Temp 36.7 °C (98 °F)   Resp 14   SpO2 96%     Covid Screen Negtive.

## 2020-09-30 NOTE — ASSESSMENT & PLAN NOTE
Status post incision and drainage & prolonged hospital stay with long IV antibiotic course completed     Wound care consulted  Pain control

## 2020-09-30 NOTE — CONSULTS
Cardiology Initial Consultation    Date of Service  9/30/2020    Referring Physician  Artie Ying M.D.    Reason for Consultation  Ischemic cardiomyopathy, consultation requested to assist with difficulty accessing outpatient care    History of Presenting Illness  Bob Schulte is a 70 y.o. male who presented 9/30/2020 with CP, 6/10.  I was asked to see him mostly to optimize medical therapy for his ischemic cardiomyopathy.    Previously on Midodrine for symptomatic low blood pressure.  Blood pressure today 120/76.    Patient's wife at the bedside.  Patient somewhat confused and having difficulty giving me history.  Wife had to remind patient that he had some chest pain.  He could not be specific but it felt like substernal chest pain.  Patient reports in general not feeling well.  Reports cramping in legs.  Wife reports patient has not eaten well for over 3 days.  Denies syncope.  Denies palpitations.  Denies significant lower extremity edema or abdominal edema.    In terms of his chest pain, ECG without ischemic changes, high-sensitivity troponin XX.    Sees Dr. Song in clinic.  Difficulty with follow-up given recurrent hospitalizations.    Pertinent history:  Coronary artery disease  2008 - PCI to the LAD, POBA to the diagonal.   2014 - anterior STEMI. POBA to the diagonal. PCI to the proximal circumflex.  Restented again in 2014 after his Plavix was stopped for an injection and patient has recurrent MI.  2016 - recurrent MI. POBA to unknown vessel  2017 - anterior STEMI. Status post PCI to the proximal and mid LAD     Ischemic cardiomyopathy, ejection fraction 40%  Paroxysmal atrial fibrillation, diagnosed 2016. Intolerant to beta blockers  TIA vs. CVA in 2016  Polycythemia vera, essential thrombocytosis  Carotid stenosis status post right CEA in March 2018  Hypertension  Hyperlipidemia, intolerant to statins  Difficulty with medical adherence      Review of Systems  Review of Systems   All other  systems reviewed and are negative.      Past Medical History   has a past medical history of Anesthesia, Cancer (HCC), Diabetes (HCC), Family history of punctured lung (2002), Heart attack (HCC) (03/2017), Hemorrhagic disorder (HCC), High cholesterol, Ischemic cardiomyopathy, Kidney stones, Orthostatic hypotension (3/29/2018), Pain, Polycythemia vera(238.4), Stroke ([facility]) (2016), Urinary bladder disorder, and Vertigo.    Surgical History   has a past surgical history that includes other cardiac surgery; cath placement; laminotomy; appendectomy; carotid endarterectomy (Right, 3/21/2018); temporal artery biopsy (Right, 6/26/2018); pr incis/drain scrotum/testis,epididym (Right, 7/25/2020); pr explore scrotum (Right, 7/29/2020); pr explore scrotum (Right, 7/31/2020); skin abscess incision and drainage (Right, 8/3/2020); and split thickness skin graft (N/A, 8/23/2020).    Family History  family history is not on file.    Social History   reports that he has never smoked. He has never used smokeless tobacco. He reports that he does not drink alcohol or use drugs.    Medications  Prior to Admission Medications   Prescriptions Last Dose Informant Patient Reported? Taking?   Insulin Degludec (TRESIBA FLEXTOUCH) 200 UNIT/ML Solution Pen-injector 9/29/2020 at 2100 Patient Yes No   Sig: Inject 8 Units as instructed every evening.   apixaban (ELIQUIS) 5mg Tab 9/29/2020 at 2100 Patient No No   Sig: Take 1 Tab by mouth 2 Times a Day.   aspirin EC (ECOTRIN) 81 MG Tablet Delayed Response 9/29/2020 at 0800 Patient Yes No   Sig: Take 1 Tab by mouth every day.   azaTHIOprine (IMURAN) 50 MG Tab 9/29/2020 at 2100 Patient Yes No   Sig: Take 50 mg by mouth 2 Times a Day. TAKE 1 TABLET BY MOUTH TWICE A DAY   finasteride (PROSCAR) 5 MG Tab 9/29/2020 at 2100 Patient Yes No   Sig: Take 5 mg by mouth every evening.   gabapentin (NEURONTIN) 300 MG Cap > 1 week at STOPPED Patient No No   Sig: Take 2 Caps by mouth 3 times a day.   hydrOXYzine HCl  "(ATARAX) 25 MG Tab > 2 weeks at unknown Patient No No   Sig: Take 1-2 Tabs by mouth 3 times a day as needed for Anxiety (sleep).   hydroxyurea (HYDREA) 500 MG Cap 9/29/2020 at 2100 Patient Yes Yes   Sig: Take 500 mg by mouth every evening. Pt started on 8/31/2020 for 30 day course   naproxen (NAPROSYN) 250 MG Tab 9/29/2020 at 0800 Patient Yes No   Sig: Take 500-750 mg by mouth 2 times a day as needed. Indications: Pain   ondansetron (ZOFRAN) 8 MG Tab > 2 days at Unknown Patient No No   Sig: Take 1 Tab by mouth every 8 hours as needed for Nausea/Vomiting.   tamsulosin (FLOMAX) 0.4 MG capsule 9/29/2020 at 0800 Patient No No   Sig: Take 2 Caps by mouth every day.   therapeutic multivitamin-minerals (THERAGRAN-M) Tab 9/29/2020 at 0800 Patient Yes No   Sig: Take 1 Tab by mouth every day.      Facility-Administered Medications: None       Allergies  Allergies   Allergen Reactions   • Doxycycline      Swelling lips and difficulties swallowing   • Beta Adrenergic Blockers Unspecified     dizziness   • Metformin Unspecified and Palpitations     Cardiac effects  \"Similar to a heart attack, I was hospitalized\"   • Simvastatin      Other reaction(s): Other (Specify with Comments)  Myalgias  Myalgias   • Statins [Hmg-Coa-R Inhibitors]      Muscle ache, joint pain       Vital signs in last 24 hours  Temp:  [36.7 °C (98 °F)] 36.7 °C (98 °F)  Pulse:  [73-89] 73  Resp:  [13-21] 13  BP: (120-147)/(76-86) 120/76  SpO2:  [96 %-98 %] 96 %    Physical Exam  Physical Exam     General: No acute distress.  Chronically ill-appearing  HEENT: EOM grossly intact, no scleral icterus, no pharyngeal erythema.   Neck:  No JVD, no bruits, trachea midline  CVS: Fast and regular. Normal S1, S2. No M/R/G. No significant LE edema.  2+ radial pulses, 2+ PT pulses  Resp: CTAB. No wheezing or crackles/rhonchi. Normal respiratory effort.  Abdomen: Soft, NT, no britta hepatomegaly.  MSK/Ext: No clubbing or cyanosis.  Has a cane at the bedside  Skin: Warm and " dry, no rashes.  Neurological: CN III-XII grossly intact. No focal deficits.  Mildly generally weak  Psych: A&O x self and place, impaired judgment, mildly confused      Lab Review  Lab Results   Component Value Date/Time    WBC 10.4 09/30/2020 08:24 AM    RBC 4.69 (L) 09/30/2020 08:24 AM    HEMOGLOBIN 11.8 (L) 09/30/2020 08:24 AM    HEMATOCRIT 37.9 (L) 09/30/2020 08:24 AM    MCV 80.8 (L) 09/30/2020 08:24 AM    MCH 25.2 (L) 09/30/2020 08:24 AM    MCHC 31.1 (L) 09/30/2020 08:24 AM    MPV 11.3 09/30/2020 08:24 AM      Lab Results   Component Value Date/Time    SODIUM 133 (L) 09/30/2020 08:24 AM    POTASSIUM 4.2 09/30/2020 08:24 AM    CHLORIDE 98 09/30/2020 08:24 AM    CO2 22 09/30/2020 08:24 AM    GLUCOSE 284 (H) 09/30/2020 08:24 AM    BUN 24 (H) 09/30/2020 08:24 AM    CREATININE 1.11 09/30/2020 08:24 AM      Lab Results   Component Value Date/Time    ASTSGOT 19 09/30/2020 08:24 AM    ALTSGPT 13 09/30/2020 08:24 AM     Lab Results   Component Value Date/Time    CHOLSTRLTOT 159 08/06/2019 08:03 AM    LDL 94 08/06/2019 08:03 AM    HDL 45 08/06/2019 08:03 AM    TRIGLYCERIDE 99 08/06/2019 08:03 AM    TROPONINT 20 (H) 09/30/2020 08:24 AM       Recent Labs     09/30/20  0824   NTPROBNP 5714*       Cardiac Imaging and Procedures Review  EKG:  My personal interpretation of the EKG dated 9- is sinus, 90, IBBB, old anterior MI    Holter 10-9-19  Normal sinus rhythm   Occasional premature ventricular contractions (approximately 3.4% of total   beats)   No sustained arrhythmias noted     Echocardiogram:  8-  CONCLUSIONS  Prior echo 7/1/20, no significant changes are noted.  Left ventricular ejection fraction is visually estimated to be 30%.  Unable to estimate pulmonary artery pressure due to an inadequate   tricuspid regurgitant jet.    Cardiac Catheterization:   DATE OF SERVICE:  03/11/2017     PROCEDURE:  Cardiac catheterization and Percutaneous Coronary Intervention.  A.  Left heart catheterization.  B.  Left  ventriculography.  C.  Selective coronary angiography.  D.  Coronary aspiration thrombectomy of the left anterior descending artery.  E.  Coronary stent implantation of the proximal left anterior descending   artery with a 4.0x8 mm drug-eluting Alpine Xience stent for in-stent   restenosis.  F.  Coronary stent implantation of the mid left anterior descending artery   with a 2.5x38 mm drug-eluting Xience Alpine stent post-dilated to 3.0 mm.  G.  Right radial artery approach.     PREOPERATIVE DIAGNOSES:  1.  ST elevation myocardial infarction, anterior.  2.  Coronary artery disease with previous myocardial infarction and coronary   intervention in 2008 and 2010.  3.  Paroxysmal atrial fibrillation.  4.  Previous stroke, December 2016.  5.  Hyperlipidemia.  6.  Polycythemia rubra vera with thrombocytopenia.  7.  Diabetes mellitus.     POSTPROCEDURE DIAGNOSES:  1.  Subacute stent thrombosis.  2.  In-stent restenosis of the proximal left anterior descending artery stent.  3.  Severe diffuse obstructive coronary artery disease of the mid left anterior   descending artery.    Stress Test: 1/6/2018   NUCLEAR IMAGING INTERPRETATION   Severe left ventriular dilation at rest.  No additional dilation noted with    stress.   Chronic left bundle branch block at rest.   Extensive, large prior inferior infarct.   No evidence of ischemia.   Global hypokinesis with resting LVEF   20-30%.   ECG INTERPRETATION   Nondiagnostic.    Imaging  DX-CHEST-PORTABLE (1 VIEW)   Final Result         1. No acute cardiopulmonary abnormalities are identified.            Assessment/Plan  -Atypical chest pain, no evidence of ACS.  -Acute encephalopathy  -Generalized weakness  -Ischemic cardiomyopathy, EF 30%  -Coronary artery disease, complex, multiple STEMI with PCI  -Hypotension, resolving  -Incomplete left bundle branch block  -Paroxysmal atrial fibrillation, intolerant to beta-blockers  -Chronic anticoagulation, chads 2 vascular score of 7  including prior stroke  -Hypertension  -Hyperlipidemia  -Statin intolerance  -Type 2 diabetes  -For near gangrene  -Carotid artery stenosis, prior endarterectomy 2018  -TIA versus CVA 2016  -Medical nonadherence    Plan:  -No plans for ICD this admission, this would be an outpatient procedure, we can refer him to EP as outpatient  -Unable to tolerate beta-blockers for CAD and CHF  -Trial lisinopril 2.5 mg as blood pressure allows, okay to use Midodrine along with lisinopril, lisinopril serving the purpose for LV remodeling with a low EF  -No statin due to statin intolerance, consider Zetia but patient thinks he is Pranay tried this  -Consider spironolactone 12.5 mg, can be done as an outpatient, would not start that this admission  -First priority will be to assess patient's current change in functional status and acute encephalopathy    Discussed with Dr. Ying    We will arrange outpatient cardiology follow-up.    Thank you for allowing me to participate in the care of this patient.    Cardiology will sign off on this patient    Please contact me with any questions.    Callie Khan M.D.   Cardiologist, [facility]  [phone]

## 2020-09-30 NOTE — ASSESSMENT & PLAN NOTE
- ECHO on 8/2020: 30% EF, cardiology is considering ICD outpatient.   - On aspirin, now on Lisinopril. Can add Midodrine if hypotension develops with Lisinopril.   - Daily weights, I's and O's

## 2020-10-01 ENCOUNTER — APPOINTMENT (OUTPATIENT)
Dept: RADIOLOGY | Facility: MEDICAL CENTER | Age: 70
DRG: 315 | End: 2020-10-01
Attending: STUDENT IN AN ORGANIZED HEALTH CARE EDUCATION/TRAINING PROGRAM
Payer: MEDICARE

## 2020-10-01 ENCOUNTER — APPOINTMENT (OUTPATIENT)
Dept: RADIOLOGY | Facility: MEDICAL CENTER | Age: 70
DRG: 315 | End: 2020-10-01
Attending: FAMILY MEDICINE
Payer: MEDICARE

## 2020-10-01 ENCOUNTER — APPOINTMENT (OUTPATIENT)
Dept: CARDIOLOGY | Facility: MEDICAL CENTER | Age: 70
DRG: 315 | End: 2020-10-01
Attending: FAMILY MEDICINE
Payer: MEDICARE

## 2020-10-01 PROBLEM — R53.83 FATIGUE: Status: ACTIVE | Noted: 2019-01-05

## 2020-10-01 PROBLEM — I31.39 PERICARDIAL EFFUSION: Status: ACTIVE | Noted: 2020-10-01

## 2020-10-01 LAB
ALBUMIN SERPL BCP-MCNC: 2.3 G/DL (ref 3.2–4.9)
ALBUMIN/GLOB SERPL: 0.5 G/DL
ALP SERPL-CCNC: 319 U/L (ref 30–99)
ALT SERPL-CCNC: 10 U/L (ref 2–50)
ANION GAP SERPL CALC-SCNC: 12 MMOL/L (ref 7–16)
APPEARANCE UR: ABNORMAL
AST SERPL-CCNC: 11 U/L (ref 12–45)
BACTERIA #/AREA URNS HPF: ABNORMAL /HPF
BASOPHILS # BLD AUTO: 0.8 % (ref 0–1.8)
BASOPHILS # BLD: 0.05 K/UL (ref 0–0.12)
BILIRUB SERPL-MCNC: 0.8 MG/DL (ref 0.1–1.5)
BILIRUB UR QL STRIP.AUTO: NEGATIVE
BUN SERPL-MCNC: 20 MG/DL (ref 8–22)
CALCIUM SERPL-MCNC: 8.6 MG/DL (ref 8.4–10.2)
CHLORIDE SERPL-SCNC: 105 MMOL/L (ref 96–112)
CO2 SERPL-SCNC: 21 MMOL/L (ref 20–33)
COLOR UR: YELLOW
CREAT SERPL-MCNC: 1.05 MG/DL (ref 0.5–1.4)
EOSINOPHIL # BLD AUTO: 0.04 K/UL (ref 0–0.51)
EOSINOPHIL NFR BLD: 0.6 % (ref 0–6.9)
ERYTHROCYTE [DISTWIDTH] IN BLOOD BY AUTOMATED COUNT: 59.7 FL (ref 35.9–50)
GLOBULIN SER CALC-MCNC: 4.5 G/DL (ref 1.9–3.5)
GLUCOSE BLD-MCNC: 297 MG/DL (ref 65–99)
GLUCOSE SERPL-MCNC: 299 MG/DL (ref 65–99)
GLUCOSE UR STRIP.AUTO-MCNC: NEGATIVE MG/DL
HCT VFR BLD AUTO: 33.8 % (ref 42–52)
HGB BLD-MCNC: 10.5 G/DL (ref 14–18)
IMM GRANULOCYTES # BLD AUTO: 0.27 K/UL (ref 0–0.11)
IMM GRANULOCYTES NFR BLD AUTO: 4.1 % (ref 0–0.9)
KETONES UR STRIP.AUTO-MCNC: NEGATIVE MG/DL
LEUKOCYTE ESTERASE UR QL STRIP.AUTO: ABNORMAL
LV EJECT FRACT  99904: 30
LYMPHOCYTES # BLD AUTO: 0.4 K/UL (ref 1–4.8)
LYMPHOCYTES NFR BLD: 6.1 % (ref 22–41)
MCH RBC QN AUTO: 25.2 PG (ref 27–33)
MCHC RBC AUTO-ENTMCNC: 31.1 G/DL (ref 33.7–35.3)
MCV RBC AUTO: 81.1 FL (ref 81.4–97.8)
MICRO URNS: ABNORMAL
MONOCYTES # BLD AUTO: 0.67 K/UL (ref 0–0.85)
MONOCYTES NFR BLD AUTO: 10.1 % (ref 0–13.4)
MUCOUS THREADS #/AREA URNS HPF: ABNORMAL /HPF
NEUTROPHILS # BLD AUTO: 5.18 K/UL (ref 1.82–7.42)
NEUTROPHILS NFR BLD: 78.3 % (ref 44–72)
NITRITE UR QL STRIP.AUTO: NEGATIVE
NRBC # BLD AUTO: 0 K/UL
NRBC BLD-RTO: 0 /100 WBC
PH UR STRIP.AUTO: 5.5 [PH] (ref 5–8)
PLATELET # BLD AUTO: 340 K/UL (ref 164–446)
PMV BLD AUTO: 11.8 FL (ref 9–12.9)
POTASSIUM SERPL-SCNC: 4.1 MMOL/L (ref 3.6–5.5)
PROT SERPL-MCNC: 6.8 G/DL (ref 6–8.2)
PROT UR QL STRIP: 30 MG/DL
RBC # BLD AUTO: 4.17 M/UL (ref 4.7–6.1)
RBC # URNS HPF: ABNORMAL /HPF
RBC UR QL AUTO: ABNORMAL
SODIUM SERPL-SCNC: 138 MMOL/L (ref 135–145)
SP GR UR STRIP.AUTO: 1.01
TROPONIN T SERPL-MCNC: 19 NG/L (ref 6–19)
UNIDENT CRYS URNS QL MICRO: ABNORMAL /HPF
WBC # BLD AUTO: 6.6 K/UL (ref 4.8–10.8)
WBC #/AREA URNS HPF: ABNORMAL /HPF

## 2020-10-01 PROCEDURE — 87186 SC STD MICRODIL/AGAR DIL: CPT

## 2020-10-01 PROCEDURE — 82962 GLUCOSE BLOOD TEST: CPT | Mod: 91

## 2020-10-01 PROCEDURE — 84484 ASSAY OF TROPONIN QUANT: CPT

## 2020-10-01 PROCEDURE — 85025 COMPLETE CBC W/AUTO DIFF WBC: CPT

## 2020-10-01 PROCEDURE — 87077 CULTURE AEROBIC IDENTIFY: CPT

## 2020-10-01 PROCEDURE — 770020 HCHG ROOM/CARE - TELE (206)

## 2020-10-01 PROCEDURE — A9270 NON-COVERED ITEM OR SERVICE: HCPCS | Performed by: INTERNAL MEDICINE

## 2020-10-01 PROCEDURE — 72193 CT PELVIS W/DYE: CPT

## 2020-10-01 PROCEDURE — A9270 NON-COVERED ITEM OR SERVICE: HCPCS | Performed by: FAMILY MEDICINE

## 2020-10-01 PROCEDURE — 700102 HCHG RX REV CODE 250 W/ 637 OVERRIDE(OP): Performed by: STUDENT IN AN ORGANIZED HEALTH CARE EDUCATION/TRAINING PROGRAM

## 2020-10-01 PROCEDURE — 87205 SMEAR GRAM STAIN: CPT

## 2020-10-01 PROCEDURE — 87070 CULTURE OTHR SPECIMN AEROBIC: CPT

## 2020-10-01 PROCEDURE — 700102 HCHG RX REV CODE 250 W/ 637 OVERRIDE(OP): Performed by: INTERNAL MEDICINE

## 2020-10-01 PROCEDURE — 700105 HCHG RX REV CODE 258: Performed by: FAMILY MEDICINE

## 2020-10-01 PROCEDURE — 99233 SBSQ HOSP IP/OBS HIGH 50: CPT | Performed by: FAMILY MEDICINE

## 2020-10-01 PROCEDURE — 700102 HCHG RX REV CODE 250 W/ 637 OVERRIDE(OP): Performed by: HOSPITALIST

## 2020-10-01 PROCEDURE — 700117 HCHG RX CONTRAST REV CODE 255: Performed by: FAMILY MEDICINE

## 2020-10-01 PROCEDURE — 80053 COMPREHEN METABOLIC PANEL: CPT

## 2020-10-01 PROCEDURE — 700111 HCHG RX REV CODE 636 W/ 250 OVERRIDE (IP): Performed by: STUDENT IN AN ORGANIZED HEALTH CARE EDUCATION/TRAINING PROGRAM

## 2020-10-01 PROCEDURE — 97161 PT EVAL LOW COMPLEX 20 MIN: CPT

## 2020-10-01 PROCEDURE — 74150 CT ABDOMEN W/O CONTRAST: CPT

## 2020-10-01 PROCEDURE — 93308 TTE F-UP OR LMTD: CPT | Mod: 26 | Performed by: INTERNAL MEDICINE

## 2020-10-01 PROCEDURE — 81001 URINALYSIS AUTO W/SCOPE: CPT

## 2020-10-01 PROCEDURE — 700111 HCHG RX REV CODE 636 W/ 250 OVERRIDE (IP): Performed by: FAMILY MEDICINE

## 2020-10-01 PROCEDURE — 700101 HCHG RX REV CODE 250: Performed by: STUDENT IN AN ORGANIZED HEALTH CARE EDUCATION/TRAINING PROGRAM

## 2020-10-01 PROCEDURE — 700102 HCHG RX REV CODE 250 W/ 637 OVERRIDE(OP): Performed by: FAMILY MEDICINE

## 2020-10-01 PROCEDURE — 93308 TTE F-UP OR LMTD: CPT

## 2020-10-01 PROCEDURE — A9270 NON-COVERED ITEM OR SERVICE: HCPCS | Performed by: HOSPITALIST

## 2020-10-01 PROCEDURE — A9270 NON-COVERED ITEM OR SERVICE: HCPCS | Performed by: STUDENT IN AN ORGANIZED HEALTH CARE EDUCATION/TRAINING PROGRAM

## 2020-10-01 RX ORDER — TAMSULOSIN HYDROCHLORIDE 0.4 MG/1
0.8 CAPSULE ORAL
Status: DISCONTINUED | OUTPATIENT
Start: 2020-10-01 | End: 2020-10-03 | Stop reason: HOSPADM

## 2020-10-01 RX ORDER — MINERAL OIL/HYDROPHIL PETROLAT
OINTMENT (GRAM) TOPICAL 2 TIMES DAILY
Status: DISCONTINUED | OUTPATIENT
Start: 2020-10-01 | End: 2020-10-03 | Stop reason: HOSPADM

## 2020-10-01 RX ORDER — GABAPENTIN 300 MG/1
300 CAPSULE ORAL 3 TIMES DAILY
Status: DISCONTINUED | OUTPATIENT
Start: 2020-10-01 | End: 2020-10-03 | Stop reason: HOSPADM

## 2020-10-01 RX ORDER — DEXTROSE MONOHYDRATE 25 G/50ML
50 INJECTION, SOLUTION INTRAVENOUS
Status: DISCONTINUED | OUTPATIENT
Start: 2020-10-01 | End: 2020-10-03 | Stop reason: HOSPADM

## 2020-10-01 RX ADMIN — BENZOCAINE AND MENTHOL, UNSPECIFIED FORM 1 LOZENGE: 15; 3.6 LOZENGE ORAL at 15:15

## 2020-10-01 RX ADMIN — TAMSULOSIN HYDROCHLORIDE 0.8 MG: 0.4 CAPSULE ORAL at 18:21

## 2020-10-01 RX ADMIN — FINASTERIDE 5 MG: 5 TABLET, FILM COATED ORAL at 18:20

## 2020-10-01 RX ADMIN — AZATHIOPRINE 50 MG: 50 TABLET ORAL at 05:30

## 2020-10-01 RX ADMIN — INSULIN GLARGINE 8 UNITS: 100 INJECTION, SOLUTION SUBCUTANEOUS at 18:31

## 2020-10-01 RX ADMIN — SENNOSIDES-DOCUSATE SODIUM TAB 8.6-50 MG 2 TABLET: 8.6-5 TAB at 18:20

## 2020-10-01 RX ADMIN — CEFTRIAXONE SODIUM 2 G: 2 INJECTION, POWDER, FOR SOLUTION INTRAMUSCULAR; INTRAVENOUS at 16:24

## 2020-10-01 RX ADMIN — IOHEXOL 100 ML: 350 INJECTION, SOLUTION INTRAVENOUS at 17:24

## 2020-10-01 RX ADMIN — APIXABAN 5 MG: 5 TABLET, FILM COATED ORAL at 05:30

## 2020-10-01 RX ADMIN — APIXABAN 5 MG: 5 TABLET, FILM COATED ORAL at 18:22

## 2020-10-01 RX ADMIN — ASPIRIN 81 MG: 81 TABLET, COATED ORAL at 05:30

## 2020-10-01 RX ADMIN — HYDROXYUREA 500 MG: 500 CAPSULE ORAL at 18:22

## 2020-10-01 RX ADMIN — NAPROXEN 750 MG: 250 TABLET ORAL at 21:07

## 2020-10-01 RX ADMIN — LISINOPRIL 2.5 MG: 2.5 TABLET ORAL at 05:30

## 2020-10-01 RX ADMIN — GABAPENTIN 300 MG: 300 CAPSULE ORAL at 12:32

## 2020-10-01 RX ADMIN — BENZOCAINE AND MENTHOL, UNSPECIFIED FORM 1 LOZENGE: 15; 3.6 LOZENGE ORAL at 08:56

## 2020-10-01 RX ADMIN — Medication: at 18:25

## 2020-10-01 RX ADMIN — BENZOCAINE AND MENTHOL, UNSPECIFIED FORM 1 LOZENGE: 15; 3.6 LOZENGE ORAL at 19:34

## 2020-10-01 RX ADMIN — GABAPENTIN 600 MG: 300 CAPSULE ORAL at 05:30

## 2020-10-01 RX ADMIN — MULTIPLE VITAMINS W/ MINERALS TAB 1 TABLET: TAB at 05:31

## 2020-10-01 RX ADMIN — TAMSULOSIN HYDROCHLORIDE 0.8 MG: 0.4 CAPSULE ORAL at 05:30

## 2020-10-01 RX ADMIN — POLYETHYLENE GLYCOL 3350 1 PACKET: 17 POWDER, FOR SOLUTION ORAL at 09:41

## 2020-10-01 RX ADMIN — AZATHIOPRINE 50 MG: 50 TABLET ORAL at 18:22

## 2020-10-01 RX ADMIN — GABAPENTIN 300 MG: 300 CAPSULE ORAL at 18:20

## 2020-10-01 RX ADMIN — INSULIN HUMAN 3 UNITS: 100 INJECTION, SOLUTION PARENTERAL at 18:30

## 2020-10-01 RX ADMIN — INSULIN HUMAN 4 UNITS: 100 INJECTION, SOLUTION PARENTERAL at 21:47

## 2020-10-01 RX ADMIN — NAPROXEN 750 MG: 250 TABLET ORAL at 08:56

## 2020-10-01 RX ADMIN — OXYCODONE HYDROCHLORIDE 5 MG: 5 TABLET ORAL at 18:48

## 2020-10-01 ASSESSMENT — ENCOUNTER SYMPTOMS
WEAKNESS: 1
NAUSEA: 0
MYALGIAS: 1
VOMITING: 0
SHORTNESS OF BREATH: 0
CHILLS: 0
FEVER: 0
FOCAL WEAKNESS: 0

## 2020-10-01 ASSESSMENT — GAIT ASSESSMENTS
DISTANCE (FEET): 200
GAIT LEVEL OF ASSIST: SUPERVISED
DEVIATION: NO DEVIATION
ASSISTIVE DEVICE: SINGLE POINT CANE

## 2020-10-01 ASSESSMENT — COGNITIVE AND FUNCTIONAL STATUS - GENERAL
SUGGESTED CMS G CODE MODIFIER MOBILITY: CI
MOBILITY SCORE: 23
CLIMB 3 TO 5 STEPS WITH RAILING: A LITTLE

## 2020-10-01 ASSESSMENT — PAIN DESCRIPTION - PAIN TYPE
TYPE: CHRONIC PAIN

## 2020-10-01 NOTE — PROGRESS NOTES
Report received from Charla JORGENSEN. POC discussed. Pt laying in bed with wife at bedside. Denies any needs at this time. Safety precautions in place.

## 2020-10-01 NOTE — PROGRESS NOTES
Hospital Medicine Daily Progress Note    Date of Service  10/1/2020    Chief Complaint  L sided chest pain    Hospital Course  A 70 y.o. 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  The patient woke up with the pain at 3 AM, compressing chest pain, intermittent, 6 /10, no radiation, associated with nausea and shortness of breath.  He has pain from Landon's gangrene incision and drainage site and also he used to have pain in his abdomen.  So he cannot differentiate the pain is whether  from his heart or from I&D site.  Patient has poor appetite and also complained of generalized weakness and dehydration.     Interval Problem Update  10/1 - Patient states that his primary symptom is significant fatigue over the last couple of weeks. He states that when he was discharged in August, he felt quite good for a couple of weeks, and then gradually decreasing energy reserves. He reports that his chest pain is improved today, as is his SOB, but he is still lightheaded upon standing and quite weak.     Consultants/Specialty  Cardiology     Code Status  Full Code    Disposition  Continued hospitalization    Review of Systems  Review of Systems   Constitutional: Negative for chills and fever.   Respiratory: Negative for shortness of breath.    Cardiovascular: Negative for chest pain and leg swelling.   Gastrointestinal: Negative for nausea and vomiting.   Musculoskeletal: Positive for myalgias.   Neurological: Positive for weakness. Negative for focal weakness.   All other systems reviewed and are negative.       Physical Exam  Temp:  [36.3 °C (97.3 °F)-37.2 °C (98.9 °F)] 36.3 °C (97.3 °F)  Pulse:  [] 80  Resp:   [18] 18  BP: (124-146)/(61-90) 124/66  SpO2:  [90 %-97 %] 92 %    Physical Exam  Vitals signs and nursing note reviewed.   Constitutional:       Appearance: Normal appearance.   HENT:      Head: Normocephalic and atraumatic.   Cardiovascular:      Rate and Rhythm: Normal rate and regular rhythm.   Pulmonary:      Effort: Pulmonary effort is normal.      Breath sounds: Normal breath sounds. No rales.   Abdominal:      General: Abdomen is flat. Bowel sounds are normal. There is no distension.      Palpations: Abdomen is soft.      Tenderness: There is no abdominal tenderness. There is no guarding.   Genitourinary:     Comments: Mild bleeding from midline scrotum, scant    Musculoskeletal:         General: No swelling.   Skin:     Coloration: Skin is pale. Skin is not jaundiced.   Neurological:      Mental Status: He is alert and oriented to person, place, and time.         Fluids    Intake/Output Summary (Last 24 hours) at 10/1/2020 1054  Last data filed at 10/1/2020 0009  Gross per 24 hour   Intake --   Output 300 ml   Net -300 ml       Laboratory  Recent Labs     09/30/20  0824 10/01/20  0339   WBC 10.4 6.6   RBC 4.69* 4.17*   HEMOGLOBIN 11.8* 10.5*   HEMATOCRIT 37.9* 33.8*   MCV 80.8* 81.1*   MCH 25.2* 25.2*   MCHC 31.1* 31.1*   RDW 60.0* 59.7*   PLATELETCT 386 340   MPV 11.3 11.8     Recent Labs     09/30/20  0824 10/01/20  0339   SODIUM 133* 138   POTASSIUM 4.2 4.1   CHLORIDE 98 105   CO2 22 21   GLUCOSE 284* 299*   BUN 24* 20   CREATININE 1.11 1.05   CALCIUM 9.1 8.6     Recent Labs     09/30/20  0824   APTT 38.8*   INR 1.76*               Imaging  CT-ABDOMEN W/O   Final Result      1.  Trace bilateral pleural effusions with associated compressive atelectasis and/or consolidation.   2.  Increased moderate size pericardial effusion.   3.  Hyperattenuating debris in the gallbladder which may represent tiny stones or sludge.   4.  Nonobstructive left renal stone.   5.  Stable mildly prominent retroperitoneal lymph  nodes.   6.  Bilateral renal cyst. No follow-up recommended for this finding.   7.  Scattered colonic diverticula without discrete evidence of diverticulitis.      DX-CHEST-PORTABLE (1 VIEW)   Final Result         1. No acute cardiopulmonary abnormalities are identified.      EC-ECHOCARDIOGRAM COMPLETE W/ CONT    (Results Pending)        Assessment/Plan  * Pain in the chest  Assessment & Plan  - Complicated cardiac issues with history of extensive CAD with POBA and stenting  - Continue aspirin and Eliquis, not on any other cardiac meds  - Troponins elevated but stable as expected with chronic heart failure   - May be related to his pericardial effusion - checking ESR and CRP, expect to be somewhat elevated with his recent Landon's gangrene     History of ST elevation myocardial infarction (STEMI)- (present on admission)  Assessment & Plan  According to the patient for ischemic cardia attacks in the past.   See A&P for chronic systolic heart failure      Chronic systolic heart failure (HCC)- (present on admission)  Assessment & Plan  - ECHO on 8/2020: 30% EF, cardiology is considering ICD outpatient.   - On aspirin, now on Lisinopril. Can add Midodrine if hypotension develops with Lisinopril.   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)  Assessment & Plan  Status post incision and drainage & prolonged hospital stay with long IV antibiotic course completed     Wound care consulted  Pain control    Type 2 diabetes mellitus, with long-term current use of insulin ([facility])- (present on admission)  Assessment & Plan  Last A1c 8.6  Continue home insulin therapy  Add sliding scale     Paroxysmal A-fib ([facility])- (present on admission)  Assessment & Plan  Telemetry monitoring   continue home Eliquis    Pericardial effusion  Assessment & Plan  - Likely secondary to his known lupus diagnosis  - Seen on CT here, was also present on CT in August during that hospitalization, but has  increased in size since then  - No physical signs of tamponade, checking stat echo to evaluate effusion size  - Bps and HR stable, not requiring O2.     QT prolongation  Assessment & Plan   on EKG  Avoid QTC prolonging medications  Use anti emetics with extra cautions    Lupus (HCC)  Assessment & Plan  - Continue home Imuran and Hydroxyurea    Fatigue  Assessment & Plan  - Recent in the past two weeks  - Check TSH, B12 in the am  - Ask PT to evaluate     Statin intolerance- (present on admission)  Assessment & Plan  Patient and his wife stated that patient has statin intolerance    Benign prostatic hyperplasia without lower urinary tract symptoms- (present on admission)  Assessment & Plan  Continue home Flomax and Proscar  Straight cath at home  Andres ordered       VTE prophylaxis: Eliquis

## 2020-10-01 NOTE — PROGRESS NOTES
Paged Dr. Ying. Pt continues to be lethargic and in discomfort. Spoke with pt wife and daughter and they state the about 7-8 days ago is when the patient began to decline. This is also when the patient discontinued taking his gabapentin with no tapering. Family also states that pt has been working on weaning narcotics down as well with the help of his doctor. Since then pt has had noted nausea, decrease appetite and malaise with lethargy. Pt continues to have cold and hot flashes. Also noted that pt has yellow coating to tounge and red thoart. Pt states that he has had sore thoart for awhile now and has noticed increased halitosis. Per Dr. Ying she will add orders.

## 2020-10-01 NOTE — ASSESSMENT & PLAN NOTE
- Likely secondary to his known lupus diagnosis  - Seen on CT here, was also present on CT in August during that hospitalization, but has increased in size since then  - No physical signs of tamponade, echo shows small effusion without evidence of tamponade  - Bps and HR stable, not requiring O2.

## 2020-10-01 NOTE — THERAPY
Physical Therapy   Initial Evaluation     Patient Name: Francesco Schulte  Age:  70 y.o., Sex:  male  Medical Record #: [id number]  Today's Date: 10/1/2020          Assessment  Patient is 70 y.o. male who was recently admitted for chest pain. Pt has a hx of chronic CHF with EF of 30% and kira gangrene of the scrotum. Pt was able to demonstrate safe functional mobility with no britta LOB or adverse events. Pt was able to ambulate household distances well with SPC. Pt did demonstrate with slight lethargy from prior admission in hospital and is not as verbose. Pt states he still has concerns for medical health, however, does not feel limited in functional mobility. Pt is encouraged to mobilize with [facility] staff at least 3x/day to prevent further deconditioning. Pt is in no acute skilled PT needs at this time.     Plan    Recommend Physical Therapy for Evaluation only     DC Equipment Recommendations: (P) None  Discharge Recommendations: (P) Anticipate that the patient will have no further physical therapy needs after discharge from the hospital     Objective       10/01/20 2945   Prior Living Situation   Prior Services None   Housing / Facility 1 Story House   Steps Into Home 0   Elevator No   Bathroom Set up Walk In Shower   Equipment Owned Front-Wheel Walker;Single Point Cane   Lives with - Patient's Self Care Capacity Spouse   Comments pt reports spouse will be able to assist upon d/c to home   Prior Level of Functional Mobility   Bed Mobility Independent   Transfer Status Independent   Ambulation Independent   Distance Ambulation (Feet)   (household distances )   Assistive Devices Used Single Point Cane   Stairs Independent   Comments pt reports of an IPLOF    Gait Analysis   Gait Level Of Assist Supervised   Assistive Device Single Point Cane   Distance (Feet) 200   # of Times Distance was Traveled 1   Deviation No deviation   # of Stairs Climbed 0   Weight Bearing Status FWB   Comments slightly veering,  however, this improved as pt continued to ambulation; no britta LOB noted; uses SPC well    Bed Mobility    Supine to Sit Supervised   Sit to Supine Supervised   Scooting Supervised   Rolling Supervised   Functional Mobility   Sit to Stand Supervised   Bed, Chair, Wheelchair Transfer Supervised

## 2020-10-01 NOTE — CARE PLAN
Problem: Safety  Goal: Will remain free from falls  Outcome: PROGRESSING AS EXPECTED  Intervention: Implement fall precautions  Flowsheets (Taken 9/30/2020 2000)  Environmental Precautions:   Treaded Slipper Socks on Patient   Personal Belongings, Wastebasket, Call Bell etc. in Easy Reach   Transferred to Stronger Side   Report Given to Other Health Care Providers Regarding Fall Risk   Bed in Low Position   Communication Sign for Patients & Families   Mobility Assessed & Appropriate Sign Placed     Problem: Knowledge Deficit  Goal: Knowledge of disease process/condition, treatment plan, diagnostic tests, and medications will improve  Outcome: PROGRESSING AS EXPECTED

## 2020-10-01 NOTE — PROGRESS NOTES
Report received from JOSLYN Nunes. Plan of care discussed. Patient resting comfortably in bed. Safety precautions in place.

## 2020-10-01 NOTE — PROGRESS NOTES
Telemetry Shift Summary     Rhythm SR with BBB  HR Range   Ectopy fPVCs, rcoup, rtriplets, rtrigem  Measurements 0.18/0.12/0.40           Normal Values  Rhythm SR  HR Range    Measurements 0.12-0.20 / 0.06-0.10  / 0.30-0.52

## 2020-10-01 NOTE — CARE PLAN
Problem: Communication  Goal: The ability to communicate needs accurately and effectively will improve  Outcome: PROGRESSING AS EXPECTED     Problem: Safety  Goal: Will remain free from injury  Outcome: PROGRESSING AS EXPECTED  Goal: Will remain free from falls  Outcome: PROGRESSING AS EXPECTED     Problem: Pain Management  Goal: Pain level will decrease to patient's comfort goal  Outcome: PROGRESSING AS EXPECTED     Problem: Respiratory:  Goal: Respiratory status will improve  Outcome: PROGRESSING AS EXPECTED

## 2020-10-01 NOTE — DIETARY
"Nutrition services: Day 1 of admit.  Francesco Schulte is a 70 y.o. male with admitting DX of Chest Pain.  Consult received for Failure to thrive. MST score of 4 per nutrition screen for unplanned weight loss of 34 or more lb x 3 months.    Assessment:  Height: 190.5 cm (6' 3\")  Weight: 85.1 kg (187 lb 9.8 oz) on bed scale. 9/30 weight on wheelchair scale = 83 kg.  Body mass index is 23.45 kg/m²., BMI classification: Normal  Diet/Intake: Cardiac, 1.5 gm Sodium. PO intake not yet recorded.    Evaluation:   1. Pt with history of Landon's gangrene with several recent admissions in July and August. S/P skin graft on 8/23/20. Per H&P, site appears to be healing well. Wound care eval pending.  2. Per chart review, weight 3 months ago on 7/1/20 = 91 kg. Weight on 8/22/20 = 82 kg on stand up scale. Pt with 9.8% weight loss from 7/1 to 8/22 which is severe. However, current weight has increased since last weight on 8/22. Recorded PO intake at admit in August was good with PO intake recorded at 50-75% and %.   3. Pt does currently have poor appetite. Per nursing note, pt started to decline about 7-8 days ago.  4. Pt was sleeping at time of visit with untouched breakfast tray in front of him. Pt awoke quickly and did start to eat. He declined offer of alternate tray and declined offer of oral nutrition supplements.  5. Labs 10/1 include Glu 299 (H), Alb 2.3 (L)  6. Medications include Lantus, SSI    Malnutrition Risk: ASPEN criteria currently not met.    Recommendations/Plan:  1. Recommend addition of Diabetic diet restriction.   2. Encourage intake of meals.  3. Though pt currently declines oral nutrition supplements, reconsider if PO intake does not improve.  4. Document intake of all meals as % taken in ADL's to provide interdisciplinary communication across all shifts.   5. Monitor weight.  6. Nutrition rep will continue to see patient for ongoing meal and snack preferences.     RD following.          "

## 2020-10-01 NOTE — PROGRESS NOTES
Monitor Summary     Rhythm: SR  Measurements: 0.20/0.12/0.40  ECTOPIES: R PVC  HR 81-89        Normal Values  Rhythm SR  HR Range    Measurements 0.12-0.20 / 0.06-0.10  / 0.30-0.52

## 2020-10-01 NOTE — CARE PLAN
Problem: Nutritional:  Goal: Achieve adequate nutritional intake  Description: Patient will consume >50% of meals.  Outcome: NOT MET

## 2020-10-02 PROBLEM — R53.83 FATIGUE: Status: RESOLVED | Noted: 2019-01-05 | Resolved: 2020-10-02

## 2020-10-02 PROBLEM — N12 PYELONEPHRITIS: Status: ACTIVE | Noted: 2020-10-02

## 2020-10-02 LAB
ALBUMIN SERPL BCP-MCNC: 2.2 G/DL (ref 3.2–4.9)
ALBUMIN/GLOB SERPL: 0.5 G/DL
ALP SERPL-CCNC: 326 U/L (ref 30–99)
ALT SERPL-CCNC: 12 U/L (ref 2–50)
ANION GAP SERPL CALC-SCNC: 13 MMOL/L (ref 7–16)
AST SERPL-CCNC: 18 U/L (ref 12–45)
BASOPHILS # BLD AUTO: 0.6 % (ref 0–1.8)
BASOPHILS # BLD: 0.05 K/UL (ref 0–0.12)
BILIRUB SERPL-MCNC: 0.6 MG/DL (ref 0.1–1.5)
BUN SERPL-MCNC: 23 MG/DL (ref 8–22)
CALCIUM SERPL-MCNC: 8.8 MG/DL (ref 8.4–10.2)
CHLORIDE SERPL-SCNC: 102 MMOL/L (ref 96–112)
CO2 SERPL-SCNC: 22 MMOL/L (ref 20–33)
CREAT SERPL-MCNC: 1.18 MG/DL (ref 0.5–1.4)
CRP SERPL HS-MCNC: 15.34 MG/DL (ref 0–0.75)
EOSINOPHIL # BLD AUTO: 0.07 K/UL (ref 0–0.51)
EOSINOPHIL NFR BLD: 0.9 % (ref 0–6.9)
ERYTHROCYTE [DISTWIDTH] IN BLOOD BY AUTOMATED COUNT: 60.2 FL (ref 35.9–50)
ERYTHROCYTE [SEDIMENTATION RATE] IN BLOOD BY WESTERGREN METHOD: 108 MM/HOUR (ref 0–20)
GLOBULIN SER CALC-MCNC: 4.7 G/DL (ref 1.9–3.5)
GLUCOSE BLD-MCNC: 170 MG/DL (ref 65–99)
GLUCOSE BLD-MCNC: 240 MG/DL (ref 65–99)
GLUCOSE BLD-MCNC: 315 MG/DL (ref 65–99)
GLUCOSE BLD-MCNC: 320 MG/DL (ref 65–99)
GLUCOSE SERPL-MCNC: 207 MG/DL (ref 65–99)
HCT VFR BLD AUTO: 34.5 % (ref 42–52)
HGB BLD-MCNC: 10.5 G/DL (ref 14–18)
IMM GRANULOCYTES # BLD AUTO: 0.18 K/UL (ref 0–0.11)
IMM GRANULOCYTES NFR BLD AUTO: 2.2 % (ref 0–0.9)
LYMPHOCYTES # BLD AUTO: 0.39 K/UL (ref 1–4.8)
LYMPHOCYTES NFR BLD: 4.8 % (ref 22–41)
MCH RBC QN AUTO: 25 PG (ref 27–33)
MCHC RBC AUTO-ENTMCNC: 30.4 G/DL (ref 33.7–35.3)
MCV RBC AUTO: 82.1 FL (ref 81.4–97.8)
MONOCYTES # BLD AUTO: 0.9 K/UL (ref 0–0.85)
MONOCYTES NFR BLD AUTO: 11.1 % (ref 0–13.4)
NEUTROPHILS # BLD AUTO: 6.5 K/UL (ref 1.82–7.42)
NEUTROPHILS NFR BLD: 80.4 % (ref 44–72)
NRBC # BLD AUTO: 0 K/UL
NRBC BLD-RTO: 0 /100 WBC
PLATELET # BLD AUTO: 295 K/UL (ref 164–446)
PMV BLD AUTO: 11.9 FL (ref 9–12.9)
POTASSIUM SERPL-SCNC: 3.9 MMOL/L (ref 3.6–5.5)
PROT SERPL-MCNC: 6.9 G/DL (ref 6–8.2)
RBC # BLD AUTO: 4.2 M/UL (ref 4.7–6.1)
SODIUM SERPL-SCNC: 137 MMOL/L (ref 135–145)
TROPONIN T SERPL-MCNC: 17 NG/L (ref 6–19)
TROPONIN T SERPL-MCNC: 18 NG/L (ref 6–19)
TSH SERPL DL<=0.005 MIU/L-ACNC: 0.46 UIU/ML (ref 0.38–5.33)
VIT B12 SERPL-MCNC: 478 PG/ML (ref 211–911)
WBC # BLD AUTO: 8.1 K/UL (ref 4.8–10.8)

## 2020-10-02 PROCEDURE — 82607 VITAMIN B-12: CPT

## 2020-10-02 PROCEDURE — 99233 SBSQ HOSP IP/OBS HIGH 50: CPT | Performed by: FAMILY MEDICINE

## 2020-10-02 PROCEDURE — 700102 HCHG RX REV CODE 250 W/ 637 OVERRIDE(OP): Performed by: INTERNAL MEDICINE

## 2020-10-02 PROCEDURE — A9270 NON-COVERED ITEM OR SERVICE: HCPCS | Performed by: INTERNAL MEDICINE

## 2020-10-02 PROCEDURE — A9270 NON-COVERED ITEM OR SERVICE: HCPCS | Performed by: FAMILY MEDICINE

## 2020-10-02 PROCEDURE — 700111 HCHG RX REV CODE 636 W/ 250 OVERRIDE (IP): Performed by: FAMILY MEDICINE

## 2020-10-02 PROCEDURE — 93005 ELECTROCARDIOGRAM TRACING: CPT | Performed by: FAMILY MEDICINE

## 2020-10-02 PROCEDURE — 85652 RBC SED RATE AUTOMATED: CPT

## 2020-10-02 PROCEDURE — 700105 HCHG RX REV CODE 258: Performed by: FAMILY MEDICINE

## 2020-10-02 PROCEDURE — 700102 HCHG RX REV CODE 250 W/ 637 OVERRIDE(OP): Performed by: STUDENT IN AN ORGANIZED HEALTH CARE EDUCATION/TRAINING PROGRAM

## 2020-10-02 PROCEDURE — 84443 ASSAY THYROID STIM HORMONE: CPT

## 2020-10-02 PROCEDURE — 84484 ASSAY OF TROPONIN QUANT: CPT | Mod: 91

## 2020-10-02 PROCEDURE — 700111 HCHG RX REV CODE 636 W/ 250 OVERRIDE (IP): Performed by: STUDENT IN AN ORGANIZED HEALTH CARE EDUCATION/TRAINING PROGRAM

## 2020-10-02 PROCEDURE — 86140 C-REACTIVE PROTEIN: CPT

## 2020-10-02 PROCEDURE — 80053 COMPREHEN METABOLIC PANEL: CPT

## 2020-10-02 PROCEDURE — 770020 HCHG ROOM/CARE - TELE (206)

## 2020-10-02 PROCEDURE — 82962 GLUCOSE BLOOD TEST: CPT | Mod: 91

## 2020-10-02 PROCEDURE — 700102 HCHG RX REV CODE 250 W/ 637 OVERRIDE(OP): Performed by: FAMILY MEDICINE

## 2020-10-02 PROCEDURE — 85025 COMPLETE CBC W/AUTO DIFF WBC: CPT

## 2020-10-02 PROCEDURE — A9270 NON-COVERED ITEM OR SERVICE: HCPCS | Performed by: HOSPITALIST

## 2020-10-02 PROCEDURE — A9270 NON-COVERED ITEM OR SERVICE: HCPCS | Performed by: STUDENT IN AN ORGANIZED HEALTH CARE EDUCATION/TRAINING PROGRAM

## 2020-10-02 PROCEDURE — 700102 HCHG RX REV CODE 250 W/ 637 OVERRIDE(OP): Performed by: HOSPITALIST

## 2020-10-02 RX ORDER — INSULIN GLARGINE 100 [IU]/ML
10 INJECTION, SOLUTION SUBCUTANEOUS EVERY EVENING
Status: DISCONTINUED | OUTPATIENT
Start: 2020-10-02 | End: 2020-10-03 | Stop reason: HOSPADM

## 2020-10-02 RX ADMIN — NAPROXEN 500 MG: 250 TABLET ORAL at 20:33

## 2020-10-02 RX ADMIN — TAMSULOSIN HYDROCHLORIDE 0.8 MG: 0.4 CAPSULE ORAL at 17:44

## 2020-10-02 RX ADMIN — HYDROXYUREA 500 MG: 500 CAPSULE ORAL at 17:44

## 2020-10-02 RX ADMIN — LISINOPRIL 2.5 MG: 2.5 TABLET ORAL at 06:10

## 2020-10-02 RX ADMIN — INSULIN HUMAN 1 UNITS: 100 INJECTION, SOLUTION PARENTERAL at 06:16

## 2020-10-02 RX ADMIN — BENZOCAINE AND MENTHOL, UNSPECIFIED FORM 1 LOZENGE: 15; 3.6 LOZENGE ORAL at 08:34

## 2020-10-02 RX ADMIN — GABAPENTIN 300 MG: 300 CAPSULE ORAL at 17:44

## 2020-10-02 RX ADMIN — NAPROXEN 750 MG: 250 TABLET ORAL at 11:25

## 2020-10-02 RX ADMIN — OXYCODONE HYDROCHLORIDE 5 MG: 5 TABLET ORAL at 20:45

## 2020-10-02 RX ADMIN — LIDOCAINE HYDROCHLORIDE 5 ML: 20 SOLUTION OROPHARYNGEAL at 17:44

## 2020-10-02 RX ADMIN — GABAPENTIN 300 MG: 300 CAPSULE ORAL at 06:10

## 2020-10-02 RX ADMIN — FINASTERIDE 5 MG: 5 TABLET, FILM COATED ORAL at 17:44

## 2020-10-02 RX ADMIN — ASPIRIN 81 MG: 81 TABLET, COATED ORAL at 06:10

## 2020-10-02 RX ADMIN — AZATHIOPRINE 50 MG: 50 TABLET ORAL at 06:11

## 2020-10-02 RX ADMIN — Medication: at 17:45

## 2020-10-02 RX ADMIN — APIXABAN 5 MG: 5 TABLET, FILM COATED ORAL at 17:44

## 2020-10-02 RX ADMIN — SENNOSIDES-DOCUSATE SODIUM TAB 8.6-50 MG 2 TABLET: 8.6-5 TAB at 06:10

## 2020-10-02 RX ADMIN — APIXABAN 5 MG: 5 TABLET, FILM COATED ORAL at 06:10

## 2020-10-02 RX ADMIN — GABAPENTIN 300 MG: 300 CAPSULE ORAL at 11:26

## 2020-10-02 RX ADMIN — CEFTRIAXONE SODIUM 2 G: 2 INJECTION, POWDER, FOR SOLUTION INTRAMUSCULAR; INTRAVENOUS at 06:09

## 2020-10-02 RX ADMIN — BENZOCAINE AND MENTHOL, UNSPECIFIED FORM 1 LOZENGE: 15; 3.6 LOZENGE ORAL at 11:26

## 2020-10-02 RX ADMIN — Medication: at 06:21

## 2020-10-02 RX ADMIN — BENZOCAINE AND MENTHOL, UNSPECIFIED FORM 1 LOZENGE: 15; 3.6 LOZENGE ORAL at 20:33

## 2020-10-02 RX ADMIN — BENZOCAINE AND MENTHOL, UNSPECIFIED FORM 1 LOZENGE: 15; 3.6 LOZENGE ORAL at 14:33

## 2020-10-02 RX ADMIN — MULTIPLE VITAMINS W/ MINERALS TAB 1 TABLET: TAB at 06:10

## 2020-10-02 RX ADMIN — INSULIN HUMAN 4 UNITS: 100 INJECTION, SOLUTION PARENTERAL at 11:30

## 2020-10-02 RX ADMIN — INSULIN HUMAN 2 UNITS: 100 INJECTION, SOLUTION PARENTERAL at 20:42

## 2020-10-02 RX ADMIN — BENZOCAINE AND MENTHOL, UNSPECIFIED FORM 1 LOZENGE: 15; 3.6 LOZENGE ORAL at 00:20

## 2020-10-02 RX ADMIN — AZATHIOPRINE 50 MG: 50 TABLET ORAL at 17:44

## 2020-10-02 ASSESSMENT — ENCOUNTER SYMPTOMS
MYALGIAS: 1
SHORTNESS OF BREATH: 0
WEAKNESS: 1
FOCAL WEAKNESS: 0
NAUSEA: 0
VOMITING: 0
CHILLS: 0
FEVER: 0

## 2020-10-02 ASSESSMENT — PAIN DESCRIPTION - PAIN TYPE
TYPE: CHRONIC PAIN
TYPE: ACUTE PAIN

## 2020-10-02 NOTE — CARE PLAN
Problem: Safety  Goal: Will remain free from falls  Outcome: PROGRESSING AS EXPECTED  Intervention: Implement fall precautions  Flowsheets (Taken 10/1/2020 2030)  Environmental Precautions:   Treaded Slipper Socks on Patient   Personal Belongings, Wastebasket, Call Bell etc. in Easy Reach   Transferred to Stronger Side   Report Given to Other Health Care Providers Regarding Fall Risk   Bed in Low Position   Communication Sign for Patients & Families   Mobility Assessed & Appropriate Sign Placed     Problem: Bowel/Gastric:  Goal: Normal bowel function is maintained or improved  Outcome: PROGRESSING AS EXPECTED

## 2020-10-02 NOTE — PROGRESS NOTES
Pt with complaint of L chest pain, under and lateral to L nipple.  Worse with deep breaths, better when sitting up. Also has mouth and throat sores - suspect lupus is contributing with aphthous and oropharyngeal ulcers with possible esophageal involvement.  Will Rx MBX s/s and check troponins.  EKG with lateral T wave inversions, otherwise unchanged. States this does not feel like his previous ACS.

## 2020-10-02 NOTE — WOUND TEAM
[facility]  Inpatient Services  Initial Wound and Skin Care Evaluation    Admission Date: 9/30/2020     Last order of IP CONSULT TO WOUND CARE was found on 9/30/2020 from Hospital Encounter on 9/30/2020     HPI, PMH, SH: Reviewed    Unit where seen by Wound Team: 3314/02     WOUND CONSULT/FOLLOW UP RELATED TO:  Right scrotum healing graft site     Self Report / Pain Level:  Pain with lifting of scrotum. Tolerated very well. Declines pain medication       OBJECTIVE:  Patient scrotum open to air but dirty. Patient able to reposition self. On regular redistribution.     WOUND TYPE, LOCATION, CHARACTERISTICS (Pressure Injuries: location, stage, POA or date identified)  Wound 09/30/20 Groin;Scrotum Distal Right POA graft site that is now healing  (Active)   Wound Image   10/01/20 1540   Site Assessment Red;Drainage 10/01/20 1540   Periwound Assessment Scar tissue;Fragile;Clean;Intact 10/01/20 1540   Margins Attached edges 10/01/20 1540   Closure Open to air 10/01/20 1540   Drainage Amount Scant 10/01/20 1540   Drainage Description Serosanguineous 10/01/20 1540   Treatments Cleansed;Site care 10/01/20 1540   Wound Cleansing Soap and Water 10/01/20 1540   Periwound Protectant Moisture Barrier 10/01/20 1540   Dressing Cleansing/Solutions Not Applicable 10/01/20 1540   Dressing Options Open to Air 10/01/20 1540   Dressing Change/Treatment Frequency Every Shift, and As Needed 10/01/20 1540   NEXT Dressing Change/Treatment Date 10/01/20 10/01/20 1540   NEXT Weekly Photo (Inpatient Only) 10/08/20 10/01/20 1540   Non-staged Wound Description Partial thickness 10/01/20 1540   Number of days: 1        Vascular:    JW:   No results found.    Lab Values:    Lab Results   Component Value Date/Time    WBC 6.6 10/01/2020 03:39 AM    RBC 4.17 (L) 10/01/2020 03:39 AM    HEMOGLOBIN 10.5 (L) 10/01/2020 03:39 AM    HEMATOCRIT 33.8 (L) 10/01/2020 03:39 AM    CREACTPROT 0.47 08/20/2020 03:31 AM    SEDRATEWES 36 (H) 08/20/2020  03:31 AM    HBA1C 8.6 (A) 01/07/2020 07:14 AM        Culture Results show:  No results found for this or any previous visit (from the past 720 hour(s)).    INTERVENTIONS BY WOUND TEAM:  Patient and chart reviewed. Patient agreeable to assessment. Patient repositioned self. Gently assessed right scrotum healing graft site. Cleansed very thoroughly and gently with no rinse foam soap and wash cloths. At very distal end of right scrotum graft site there is a small partial thickness opening with serosanguinous drainage. Otherwise graft site healing well. Barrier paste to be applied for now, then patient wife to bring in prescribed ointment given by surgeon and nursing to apply that Q shift. Right thigh donor site is dry and intact, aquaphor to be applied. Orders placed. Bedside RN Tera updated.    Interdisciplinary consultation: Patient, Bedside RN (tera)    EVALUATION / RATIONALE FOR TREATMENT: Patient well known to wound team. Had skin graft to right scrotum, donor site was right thigh. Right thigh dry and intact, patient to continue putting aquaphor/vaseline on thigh. Right scrotum graft site is healing as expected and only a small partial thickness area opened at distal end, patient to continue dressing and skin care that was ordered by surgeon outpatient. Orders placed. Wound team to followup once weekly to monitor.       Goals: Steady decrease in wound area and depth weekly.    NURSING PLAN OF CARE ORDERS (X):    Dressing changes: See Dressing Care orders:   Skin care: See Skin Care orders: X  Rectal tube care: See Rectal Tube Care orders:   Other orders:    RSKIN:   CURRENTLY IN PLACE (X), APPLIED THIS VISIT (A), ORDERED (O):   Q shift Neal:  X  Q shift pressure point assessments:  X  Pressure redistribution mattress   X         Low Airloss          Bariatric MASSIMO         Bariatric foam           Heel float boots     Heel Silicone dressing        Float Heels off Bed with Pillows               Barrier wipes          Barrier Cream         Barrier paste          Sacral silicone dressing         Silicone O2 tubing         Anchorfast         Cannula fixation Device (Tender )          Gray Foam Ear protectors     High flow offloading Clip    Elastic head band offloading device                                                      Trach with Optifoam split foam       Z Kristopher Pillow                             Waffle cushion        Waffle Overlay         Rectal tube or BMS    Purwick/Condom Cath          Antifungal tx      Interdry          Reposition q 2 hours      TAPs Turning system                 Up to chair        Ambulate    X  PT/OT        Dietician        Diabetes Education      PO X    TF     TPN     NPO   # days   Other        WOUND TEAM PLAN OF CARE:   Dressing changes by wound team:                   Follow up 3 times weekly:                NPWT change 3 times weekly:     Follow up 1-2 times weekly:  X - wound team to monitor once weekly. Nursing to follow orders.     Follow up Bi-Monthly:                   Follow up as needed:       Other (explain):     Anticipated discharge plans: continue follow up with MD  LTACH:        SNF/Rehab:                  Home Health Care:           Outpatient Wound Center:            Self Care:

## 2020-10-02 NOTE — PROGRESS NOTES
Stopped by CNA. Notified a pt c/o of CP. VSS Pt states he is unable to take a deep breath without pain. Pain is able to be reproduced if RN applies pressure below left nipple. MD notified. New orders received.

## 2020-10-02 NOTE — PROGRESS NOTES
Telemetry Shift Summary     Rhythm SR with BBB  HR Range   Ectopy fPVCs, ocoup, rtrigem  Measurements 0.16/0.12/0.40           Normal Values  Rhythm SR  HR Range    Measurements 0.12-0.20 / 0.06-0.10  / 0.30-0.52

## 2020-10-02 NOTE — PROGRESS NOTES
Report received from Chan JORGENSEN. POC discussed. Pt sitting up in bed with wife at bedside. Safety precautions in place.

## 2020-10-02 NOTE — PROGRESS NOTES
Hospital Medicine Daily Progress Note    Date of Service  10/2/2020    Chief Complaint  L sided chest pain    Hospital Course  A 70 y.o. 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  The patient woke up with the pain at 3 AM, compressing chest pain, intermittent, 6 /10, no radiation, associated with nausea and shortness of breath.  He has pain from Landon's gangrene incision and drainage site and also he used to have pain in his abdomen.  So he cannot differentiate the pain is whether  from his heart or from I&D site.  Patient has poor appetite and also complained of generalized weakness and dehydration.     Interval Problem Update  10/1 - Patient states that his primary symptom is significant fatigue over the last couple of weeks. He states that when he was discharged in August, he felt quite good for a couple of weeks, and then gradually decreasing energy reserves. He reports that his chest pain is improved today, as is his SOB, but he is still lightheaded upon standing and quite weak.     10/2 - Patient states that he feels significantly better after antibiotic administration. Having some elevated blood sugars, will increase Lantus accordingly. Does relate that he had been having dysuria lately, and had a go during his recent hospitalizations.     Consultants/Specialty  Cardiology     Code Status  Full Code    Disposition  Continued hospitalization    Review of Systems  Review of Systems   Constitutional: Negative for chills and fever.   Respiratory: Negative for shortness of breath.    Cardiovascular: Negative for chest pain and leg swelling.   Gastrointestinal: Negative for nausea and vomiting.    Musculoskeletal: Positive for myalgias.   Neurological: Positive for weakness. Negative for focal weakness.   All other systems reviewed and are negative.       Physical Exam  Temp:  [36.4 °C (97.6 °F)-37 °C (98.6 °F)] 36.8 °C (98.3 °F)  Pulse:  [66-94] 71  Resp:  [18] 18  BP: (104-134)/(59-74) 104/63  SpO2:  [93 %-99 %] 93 %    Physical Exam  Vitals signs and nursing note reviewed.   Constitutional:       Appearance: Normal appearance.   HENT:      Head: Normocephalic and atraumatic.   Cardiovascular:      Rate and Rhythm: Normal rate and regular rhythm.   Pulmonary:      Effort: Pulmonary effort is normal.      Breath sounds: Normal breath sounds. No rales.   Abdominal:      General: Abdomen is flat. Bowel sounds are normal. There is no distension.      Palpations: Abdomen is soft.      Tenderness: There is no abdominal tenderness. There is no guarding.   Genitourinary:     Comments: Mild bleeding from midline scrotum, scant    Musculoskeletal:         General: No swelling.   Skin:     Coloration: Skin is pale. Skin is not jaundiced.   Neurological:      Mental Status: He is alert and oriented to person, place, and time.         Fluids    Intake/Output Summary (Last 24 hours) at 10/2/2020 1028  Last data filed at 10/2/2020 1000  Gross per 24 hour   Intake 360 ml   Output --   Net 360 ml       Laboratory  Recent Labs     09/30/20  0824 10/01/20  0339 10/02/20  0420   WBC 10.4 6.6 8.1   RBC 4.69* 4.17* 4.20*   HEMOGLOBIN 11.8* 10.5* 10.5*   HEMATOCRIT 37.9* 33.8* 34.5*   MCV 80.8* 81.1* 82.1   MCH 25.2* 25.2* 25.0*   MCHC 31.1* 31.1* 30.4*   RDW 60.0* 59.7* 60.2*   PLATELETCT 386 340 295   MPV 11.3 11.8 11.9     Recent Labs     09/30/20  0824 10/01/20  0339 10/02/20  0420   SODIUM 133* 138 137   POTASSIUM 4.2 4.1 3.9   CHLORIDE 98 105 102   CO2 22 21 22   GLUCOSE 284* 299* 207*   BUN 24* 20 23*   CREATININE 1.11 1.05 1.18   CALCIUM 9.1 8.6 8.8     Recent Labs     09/30/20  0824   APTT 38.8*   INR 1.76*                Imaging  CT-PELVIS WITH   Final Result      1.  No evidence of subcutaneous air in the pelvis or perineum. No drainable subcutaneous fluid collections.   2.  Persistent mild retroperitoneal lymphadenopathy, nonspecific. No iliac or inguinal lymphadenopathy.   3.  Mildly enlarged prostate.      EC-ECHOCARDIOGRAM LTD W/O CONT   Final Result      CT-ABDOMEN W/O   Final Result      1.  Trace bilateral pleural effusions with associated compressive atelectasis and/or consolidation.   2.  Increased moderate size pericardial effusion.   3.  Hyperattenuating debris in the gallbladder which may represent tiny stones or sludge.   4.  Nonobstructive left renal stone.   5.  Stable mildly prominent retroperitoneal lymph nodes.   6.  Bilateral renal cyst. No follow-up recommended for this finding.   7.  Scattered colonic diverticula without discrete evidence of diverticulitis.      DX-CHEST-PORTABLE (1 VIEW)   Final Result         1. No acute cardiopulmonary abnormalities are identified.           Assessment/Plan  * Pain in the chest  Assessment & Plan  - Complicated cardiac issues with history of extensive CAD with POBA and stenting  - Continue aspirin and Eliquis, not on any other cardiac meds  - Troponins elevated but stable as expected with chronic heart failure   - Resolved   - Pericardial effusion is small on echo, no s/s tamponade    Pyelonephritis with bacteremia   Assessment & Plan  - GNR in 1/2 blood culture, UA is dirty and pt having dysuria at home PTA  - Continue Rocephin, await speciation and sensitivities  - Afebrile overnight, not septic    History of ST elevation myocardial infarction (STEMI)- (present on admission)  Assessment & Plan  According to the patient for ischemic cardia attacks in the past.   See A&P for chronic systolic heart failure      Chronic systolic heart failure (HCC)- (present on admission)  Assessment & Plan  - ECHO on 8/2020: 30% EF, cardiology is considering ICD outpatient.   - On  aspirin, now on Lisinopril. Can add Midodrine if hypotension develops with Lisinopril.   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)  Assessment & Plan  Status post incision and drainage & prolonged hospital stay with long IV antibiotic course completed     Wound care consulted  Pain control    Type 2 diabetes mellitus, with long-term current use of insulin ([facility])- (present on admission)  Assessment & Plan  Last A1c 8.6  Increase Lantus to 10u  Add sliding scale     Paroxysmal A-fib ([facility])- (present on admission)  Assessment & Plan  Telemetry monitoring   continue home Eliquis    Pericardial effusion  Assessment & Plan  - Likely secondary to his known lupus diagnosis  - Seen on CT here, was also present on CT in August during that hospitalization, but has increased in size since then  - No physical signs of tamponade, echo shows small effusion without evidence of tamponade  - Bps and HR stable, not requiring O2.     QT prolongation  Assessment & Plan   on EKG  Avoid QTC prolonging medications  Use anti emetics with extra cautions    Lupus ([facility])  Assessment & Plan  - Continue home Imuran and Hydroxyurea    Statin intolerance- (present on admission)  Assessment & Plan  Patient and his wife stated that patient has statin intolerance    Benign prostatic hyperplasia without lower urinary tract symptoms- (present on admission)  Assessment & Plan  Continue home Flomax and Proscar  Straight cath at home, PRN here          VTE prophylaxis: Eliquis

## 2020-10-02 NOTE — ASSESSMENT & PLAN NOTE
- GNR in 1/2 blood culture, UA is dirty and pt having dysuria at home PTA  - Continue Rocephin, await speciation and sensitivities  - Afebrile overnight, not septic

## 2020-10-03 VITALS
TEMPERATURE: 99.3 F | WEIGHT: 187.39 LBS | BODY MASS INDEX: 23.3 KG/M2 | DIASTOLIC BLOOD PRESSURE: 78 MMHG | RESPIRATION RATE: 18 BRPM | HEART RATE: 84 BPM | SYSTOLIC BLOOD PRESSURE: 136 MMHG | OXYGEN SATURATION: 93 % | HEIGHT: 75 IN

## 2020-10-03 PROBLEM — N49.3 FOURNIER'S GANGRENE OF SCROTUM: Chronic | Status: RESOLVED | Noted: 2020-07-25 | Resolved: 2020-10-03

## 2020-10-03 PROBLEM — R07.9 PAIN IN THE CHEST: Status: RESOLVED | Noted: 2019-01-05 | Resolved: 2020-10-03

## 2020-10-03 PROBLEM — N12 PYELONEPHRITIS: Status: RESOLVED | Noted: 2020-10-02 | Resolved: 2020-10-03

## 2020-10-03 LAB
BACTERIA BLD CULT: ABNORMAL
BACTERIA BLD CULT: ABNORMAL
EKG IMPRESSION: NORMAL
GLUCOSE BLD-MCNC: 206 MG/DL (ref 65–99)
GLUCOSE BLD-MCNC: 261 MG/DL (ref 65–99)
GRAM STN SPEC: NORMAL
SIGNIFICANT IND 70042: ABNORMAL
SIGNIFICANT IND 70042: NORMAL
SITE SITE: ABNORMAL
SITE SITE: NORMAL
SOURCE SOURCE: ABNORMAL
SOURCE SOURCE: NORMAL
TROPONIN T SERPL-MCNC: 19 NG/L (ref 6–19)

## 2020-10-03 PROCEDURE — A9270 NON-COVERED ITEM OR SERVICE: HCPCS | Performed by: FAMILY MEDICINE

## 2020-10-03 PROCEDURE — 700102 HCHG RX REV CODE 250 W/ 637 OVERRIDE(OP): Performed by: INTERNAL MEDICINE

## 2020-10-03 PROCEDURE — 700111 HCHG RX REV CODE 636 W/ 250 OVERRIDE (IP): Performed by: STUDENT IN AN ORGANIZED HEALTH CARE EDUCATION/TRAINING PROGRAM

## 2020-10-03 PROCEDURE — A9270 NON-COVERED ITEM OR SERVICE: HCPCS | Performed by: INTERNAL MEDICINE

## 2020-10-03 PROCEDURE — 700102 HCHG RX REV CODE 250 W/ 637 OVERRIDE(OP): Performed by: FAMILY MEDICINE

## 2020-10-03 PROCEDURE — 82962 GLUCOSE BLOOD TEST: CPT | Mod: 91

## 2020-10-03 PROCEDURE — 99239 HOSP IP/OBS DSCHRG MGMT >30: CPT | Performed by: FAMILY MEDICINE

## 2020-10-03 PROCEDURE — 93010 ELECTROCARDIOGRAM REPORT: CPT | Performed by: INTERNAL MEDICINE

## 2020-10-03 PROCEDURE — 700102 HCHG RX REV CODE 250 W/ 637 OVERRIDE(OP): Performed by: STUDENT IN AN ORGANIZED HEALTH CARE EDUCATION/TRAINING PROGRAM

## 2020-10-03 PROCEDURE — 700111 HCHG RX REV CODE 636 W/ 250 OVERRIDE (IP): Performed by: FAMILY MEDICINE

## 2020-10-03 PROCEDURE — 700105 HCHG RX REV CODE 258: Performed by: FAMILY MEDICINE

## 2020-10-03 PROCEDURE — A9270 NON-COVERED ITEM OR SERVICE: HCPCS | Performed by: STUDENT IN AN ORGANIZED HEALTH CARE EDUCATION/TRAINING PROGRAM

## 2020-10-03 RX ORDER — INSULIN DEGLUDEC 200 U/ML
10 INJECTION, SOLUTION SUBCUTANEOUS EVERY EVENING
Qty: 1 EACH | Refills: 0
Start: 2020-10-03 | End: 2020-12-04 | Stop reason: SDUPTHER

## 2020-10-03 RX ORDER — MINERAL OIL/HYDROPHIL PETROLAT
1 OINTMENT (GRAM) TOPICAL 2 TIMES DAILY
COMMUNITY
Start: 2020-10-03 | End: 2020-12-11

## 2020-10-03 RX ORDER — LISINOPRIL 2.5 MG/1
2.5 TABLET ORAL DAILY
Qty: 30 TAB | Refills: 0 | Status: SHIPPED | OUTPATIENT
Start: 2020-10-04 | End: 2020-12-03 | Stop reason: SDUPTHER

## 2020-10-03 RX ORDER — CALCIUM CARBONATE 500 MG/1
1000 TABLET, CHEWABLE ORAL 2 TIMES DAILY
Status: DISCONTINUED | OUTPATIENT
Start: 2020-10-03 | End: 2020-10-03 | Stop reason: HOSPADM

## 2020-10-03 RX ORDER — CEFDINIR 300 MG/1
300 CAPSULE ORAL 2 TIMES DAILY
Qty: 22 CAP | Refills: 0 | Status: SHIPPED | OUTPATIENT
Start: 2020-10-03 | End: 2020-10-14

## 2020-10-03 RX ADMIN — OXYCODONE HYDROCHLORIDE 5 MG: 5 TABLET ORAL at 12:23

## 2020-10-03 RX ADMIN — MULTIPLE VITAMINS W/ MINERALS TAB 1 TABLET: TAB at 06:12

## 2020-10-03 RX ADMIN — NAPROXEN 750 MG: 250 TABLET ORAL at 09:25

## 2020-10-03 RX ADMIN — APIXABAN 5 MG: 5 TABLET, FILM COATED ORAL at 06:12

## 2020-10-03 RX ADMIN — ASPIRIN 81 MG: 81 TABLET, COATED ORAL at 06:12

## 2020-10-03 RX ADMIN — CALCIUM CARBONATE (ANTACID) CHEW TAB 500 MG 1000 MG: 500 CHEW TAB at 10:29

## 2020-10-03 RX ADMIN — Medication: at 06:22

## 2020-10-03 RX ADMIN — LISINOPRIL 2.5 MG: 2.5 TABLET ORAL at 06:12

## 2020-10-03 RX ADMIN — AZATHIOPRINE 50 MG: 50 TABLET ORAL at 06:13

## 2020-10-03 RX ADMIN — INSULIN HUMAN 2 UNITS: 100 INJECTION, SOLUTION PARENTERAL at 06:20

## 2020-10-03 RX ADMIN — LIDOCAINE HYDROCHLORIDE 5 ML: 20 SOLUTION OROPHARYNGEAL at 00:52

## 2020-10-03 RX ADMIN — SENNOSIDES-DOCUSATE SODIUM TAB 8.6-50 MG 2 TABLET: 8.6-5 TAB at 06:12

## 2020-10-03 RX ADMIN — GABAPENTIN 300 MG: 300 CAPSULE ORAL at 06:12

## 2020-10-03 RX ADMIN — GABAPENTIN 300 MG: 300 CAPSULE ORAL at 11:21

## 2020-10-03 RX ADMIN — CEFTRIAXONE SODIUM 2 G: 2 INJECTION, POWDER, FOR SOLUTION INTRAMUSCULAR; INTRAVENOUS at 06:12

## 2020-10-03 RX ADMIN — INSULIN HUMAN 3 UNITS: 100 INJECTION, SOLUTION PARENTERAL at 11:22

## 2020-10-03 ASSESSMENT — PAIN DESCRIPTION - PAIN TYPE
TYPE: ACUTE PAIN

## 2020-10-03 ASSESSMENT — FIBROSIS 4 INDEX: FIB4 SCORE: 1.2

## 2020-10-03 NOTE — PROGRESS NOTES
Report received from Chan JORGENSEN. POC discussed. Pt sitting up in bed. Denies any needs at this time.

## 2020-10-03 NOTE — PROGRESS NOTES
Report received. Assumed care of patient. Patient assisted back from bathroom and set up with breakfast in bed. Patient tearful this morning regarding his infection and previous ICU admission. RN educated patient and reassured patient. Patient currently resting comfortably in bed. All needs are currently met. All safety precautions in place. Will continue to monitor.

## 2020-10-03 NOTE — PROGRESS NOTES
Discharge instructions provided. PIV and telemetry monitor removed. Patient to be transported out via wheelchair with transport.

## 2020-10-03 NOTE — CARE PLAN
Problem: Nutritional:  Goal: Achieve adequate nutritional intake  Description: Patient will consume >50% of meals.  Outcome: PROGRESSING AS EXPECTED   Recorded PO intake variable from <25% to %. Pt ate % of breakfast today. Encourage PO intake. RD following.   99

## 2020-10-03 NOTE — PROGRESS NOTES
Telemetry Shift Summary     Rhythm SR with BBB  HR Range 69-80  Ectopy oPVCs, rcoup  Measurements 0.18/0.12/0.44           Normal Values  Rhythm SR  HR Range    Measurements 0.12-0.20 / 0.06-0.10  / 0.30-0.52

## 2020-10-03 NOTE — CARE PLAN
Problem: Safety  Goal: Will remain free from falls  Outcome: PROGRESSING AS EXPECTED  Intervention: Implement fall precautions  Flowsheets (Taken 10/2/2020 2000)  Environmental Precautions:   Treaded Slipper Socks on Patient   Personal Belongings, Wastebasket, Call Bell etc. in Easy Reach   Transferred to Stronger Side   Report Given to Other Health Care Providers Regarding Fall Risk   Bed in Low Position   Communication Sign for Patients & Families   Mobility Assessed & Appropriate Sign Placed     Problem: Psychosocial Needs:  Goal: Level of anxiety will decrease  Outcome: PROGRESSING AS EXPECTED  Note: Pt verbalizes he is feeling a lot better. We were able to have a meaningful  conversation about his care plan and he was very receptive to the information provided.

## 2020-10-03 NOTE — DISCHARGE SUMMARY
Discharge Summary    CHIEF COMPLAINT ON ADMISSION  Chief Complaint   Patient presents with   • Chest Pain     started last NOC        Reason for Admission  Chest Pain     Admission Date  9/30/2020    CODE STATUS  Full Code    HPI & HOSPITAL COURSE  This is a 70 y.o. male here with L sided chest pain and fatigue.  He has known systolic heart failure, but repeat echo showed an EF consistent with prior EFs, and no new WMAs - he does, however, have a small pericardial effusion that is likely causing pleuritic symptoms.  His troponins were normal, and his pain has resolved during admission. He also states that this does not feel like his typical anginal pain; he does have lupus, and one effect of that is aphthous and oropharyngeal ulcers; it is reasonable to assume that he may have esophageal involvement as well, and as his pain resolved with MBX s/s, I suspect this to contribute to his pain.     Pt was found to be bacteremic with E coli during his stay - he had a dirty UA that was the likely source, though his urine was discarded before culturing, and he has now been on antibiotics for a few days.  Given his immunosuppression with lupus treatment, I will treat his bacteremia with Omnicef for a total of 14 days - I will avoid fluoroquinolones given his borderline QTc. He states that he feels quite well today - his WBC count is normal, he is afebrile and his chest pain has resolved. He has a recent history of Landon's gangrene, but imaging showed resolution of that. He is to follow up with his PCP in one week.       Therefore, he is discharged in good and stable condition to home with close outpatient follow-up.    The patient met 2-midnight criteria for an inpatient stay at the time of discharge.    Discharge Date  10/3/20    FOLLOW UP ITEMS POST DISCHARGE  None    DISCHARGE DIAGNOSES  Principal Problem (Resolved):    Pain in the chest POA: Unknown      Overview: IMO load March 2020  Active Problems:    Chronic  systolic heart failure (HCC) (Chronic) POA: Yes    History of ST elevation myocardial infarction (STEMI) (Chronic) POA: Yes    Paroxysmal A-fib (HCC) (Chronic) POA: Yes    Type 2 diabetes mellitus, with long-term current use of insulin (HCC) (Chronic) POA: Yes      Overview: Chronic condition treated with Glipizide and Lantus.      Sliding scale insulin during acute hospitalization.          Lupus (HCC) POA: Unknown    QT prolongation POA: Unknown    Pericardial effusion POA: Unknown    Benign prostatic hyperplasia without lower urinary tract symptoms (Chronic) POA: Yes    Statin intolerance (Chronic) POA: Yes  Resolved Problems:    Pyelonephritis with bacteremia  POA: Unknown    Landon's gangrene of scrotum (Chronic) POA: Yes    Fatigue POA: Unknown      FOLLOW UP  Future Appointments   Date Time Provider Department Center   10/19/2020 12:45 PM ATUL [facility] None     SERA HarmanP.R.NYeni  [address] [facility]  [address] [place] [id number] [phone]      Call Monday to schedule a hospital follow-up appointment.      MEDICATIONS ON DISCHARGE     Medication List      START taking these medications      Instructions   cefdinir 300 MG Caps  Commonly known as: OMNICEF   Take 1 Cap by mouth 2 times a day for 11 days.  Dose: 300 mg     hydrophilic ointment Oint   Apply 1 Each to affected area(s) 2 Times a Day.  Dose: 1 Each     lisinopril 2.5 MG Tabs  Start taking on: October 4, 2020  Commonly known as: PRINIVIL   Take 1 Tab by mouth every day.  Dose: 2.5 mg     MBX Susp   Doctor's comments: Mix Maalox: diphenhydramine: lidocaine 2% in 1:1:1 ratio.  Take 5 mL by mouth every 6 hours as needed for up to 7 days.  Dose: 5 mL        CHANGE how you take these medications      Instructions   Tresiba FlexTouch 200 UNIT/ML Sopn  What changed: how much to take  Generic drug: Insulin Degludec   Inject 10 Units as instructed every evening.  Dose: 10 Units        CONTINUE taking these medications       "Instructions   apixaban 5mg Tabs  Commonly known as: Eliquis   Doctor's comments: Please provide generic if available. Thank you!  Take 1 Tab by mouth 2 Times a Day.  Dose: 5 mg     aspirin EC 81 MG Tbec  Commonly known as: ECOTRIN   Take 1 Tab by mouth every day.  Dose: 81 mg     azaTHIOprine 50 MG Tabs  Commonly known as: IMURAN   Take 50 mg by mouth 2 Times a Day. TAKE 1 TABLET BY MOUTH TWICE A DAY  Dose: 50 mg     finasteride 5 MG Tabs  Commonly known as: PROSCAR   Take 5 mg by mouth every evening.  Dose: 5 mg     gabapentin 300 MG Caps  Commonly known as: NEURONTIN   Take 2 Caps by mouth 3 times a day.  Dose: 600 mg     hydroxyurea 500 MG Caps  Commonly known as: HYDREA   Take 500 mg by mouth every evening. Pt started on 8/31/2020 for 30 day course  Dose: 500 mg     hydrOXYzine HCl 25 MG Tabs  Commonly known as: ATARAX   Take 1-2 Tabs by mouth 3 times a day as needed for Anxiety (sleep).  Dose: 25-50 mg     naproxen 250 MG Tabs  Commonly known as: NAPROSYN   Take 500-750 mg by mouth 2 times a day as needed. Indications: Pain  Dose: 500-750 mg     ondansetron 8 MG Tabs  Commonly known as: ZOFRAN   Take 1 Tab by mouth every 8 hours as needed for Nausea/Vomiting.  Dose: 8 mg     tamsulosin 0.4 MG capsule  Commonly known as: FLOMAX   Take 2 Caps by mouth every day.  Dose: 0.8 mg     therapeutic multivitamin-minerals Tabs   Take 1 Tab by mouth every day.  Dose: 1 Tab            Allergies  Allergies   Allergen Reactions   • Doxycycline      Swelling lips and difficulties swallowing   • Beta Adrenergic Blockers Unspecified     dizziness   • Metformin Unspecified and Palpitations     Cardiac effects  \"Similar to a heart attack, I was hospitalized\"   • Simvastatin      Other reaction(s): Other (Specify with Comments)  Myalgias  Myalgias   • Statins [Hmg-Coa-R Inhibitors]      Muscle ache, joint pain       DIET  Orders Placed This Encounter   Procedures   • Diet Order Cardiac, Diabetic     Standing Status:   Standing     " Number of Occurrences:   1     Order Specific Question:   Diet:     Answer:   Cardiac [6]     Order Specific Question:   Diet:     Answer:   Diabetic [3]     Order Specific Question:   Electrolyte modifications:     Answer:   1.5 g Sodium [1]       ACTIVITY  As tolerated.  Weight bearing as tolerated    CONSULTATIONS  None    PROCEDURES  None    LABORATORY  Lab Results   Component Value Date    SODIUM 137 10/02/2020    POTASSIUM 3.9 10/02/2020    CHLORIDE 102 10/02/2020    CO2 22 10/02/2020    GLUCOSE 207 (H) 10/02/2020    BUN 23 (H) 10/02/2020    CREATININE 1.18 10/02/2020        Lab Results   Component Value Date    WBC 8.1 10/02/2020    HEMOGLOBIN 10.5 (L) 10/02/2020    HEMATOCRIT 34.5 (L) 10/02/2020    PLATELETCT 295 10/02/2020        Total time of the discharge process exceeds 34 minutes.

## 2020-10-03 NOTE — DISCHARGE INSTRUCTIONS
Discharge Instructions    Discharged to home by car with relative. Discharged via wheelchair, hospital escort: Yes.  Special equipment needed: Not Applicable    Be sure to schedule a follow-up appointment with your primary care doctor or any specialists as instructed.     Discharge Plan:   Diet Plan: Discussed  Activity Level: Discussed  Confirmed Follow up Appointment: Patient to Call and Schedule Appointment  Confirmed Symptoms Management: Discussed  Medication Reconciliation Updated: Yes  Influenza Vaccine Indication: Patient Refuses    I understand that a diet low in cholesterol, fat, and sodium is recommended for good health. Unless I have been given specific instructions below for another diet, I accept this instruction as my diet prescription.   Other diet: Heart healthy    Special Instructions: None    · Is patient discharged on Warfarin / Coumadin?   No     Depression / Suicide Risk    As you are discharged from this [facility] facility, it is important to learn how to keep safe from harming yourself.    Recognize the warning signs:  · Abrupt changes in personality, positive or negative- including increase in energy   · Giving away possessions  · Change in eating patterns- significant weight changes-  positive or negative  · Change in sleeping patterns- unable to sleep or sleeping all the time   · Unwillingness or inability to communicate  · Depression  · Unusual sadness, discouragement and loneliness  · Talk of wanting to die  · Neglect of personal appearance   · Rebelliousness- reckless behavior  · Withdrawal from people/activities they love  · Confusion- inability to concentrate     If you or a loved one observes any of these behaviors or has concerns about self-harm, here's what you can do:  · Talk about it- your feelings and reasons for harming yourself  · Remove any means that you might use to hurt yourself (examples: pills, rope, extension cords, firearm)  · Get professional help from the  community (Mental Health, Substance Abuse, psychological counseling)  · Do not be alone:Call your Safe Contact- someone whom you trust who will be there for you.  · Call your local CRISIS HOTLINE 485-3626 or 826-992-5679  · Call your local Children's Mobile Crisis Response Team [place] (890) 109-1524 or www.demandmart  · Call the toll free National Suicide Prevention Hotlines   · National Suicide Prevention Lifeline 810-906-HITS (9510)  · [place] [facility] 800-SUICIDE (071-1001)    Diet: Diet Order Cardiac, Diabetic  Activity: As tolerated  Follow Up: PCP in one week  Disposition:Home  Diagnosis: Pain in the chest    Follow up with your Primary Care Provider CONNER Harman. as scheduled or sooner if your symptoms persist or worsen.  Return to Emergency Room for severe chest pain, shortness of breath, signs of a stroke, or any other emergencies.

## 2020-10-04 LAB — GLUCOSE BLD-MCNC: 195 MG/DL (ref 65–99)

## 2020-10-05 LAB
BACTERIA BLD CULT: NORMAL
BACTERIA WND AEROBE CULT: ABNORMAL
BACTERIA WND AEROBE CULT: ABNORMAL
GRAM STN SPEC: ABNORMAL
SIGNIFICANT IND 70042: ABNORMAL
SIGNIFICANT IND 70042: NORMAL
SITE SITE: ABNORMAL
SITE SITE: NORMAL
SOURCE SOURCE: ABNORMAL
SOURCE SOURCE: NORMAL

## 2020-10-07 NOTE — ASSESSMENT & PLAN NOTE
HERRERAW NOTIFIED [facility] OF PATIENT BEING DISCHARGED. Patient reports having had a monitor test done that did not show any episodes of A. fib.  He has, however, been recommended to stay on his Eliquis for now.  He denies any sensation of palpitation, dizziness, shortness of breath.  His blood pressure has been improved recently

## 2020-10-21 ENCOUNTER — TELEMEDICINE (OUTPATIENT)
Dept: MEDICAL GROUP | Facility: MEDICAL CENTER | Age: 70
End: 2020-10-21
Payer: MEDICARE

## 2020-10-21 VITALS
BODY MASS INDEX: 21.87 KG/M2 | WEIGHT: 175 LBS | TEMPERATURE: 97 F | SYSTOLIC BLOOD PRESSURE: 120 MMHG | DIASTOLIC BLOOD PRESSURE: 80 MMHG

## 2020-10-21 DIAGNOSIS — I50.22 CHRONIC SYSTOLIC HEART FAILURE (HCC): Chronic | ICD-10-CM

## 2020-10-21 DIAGNOSIS — I48.0 PAROXYSMAL A-FIB (HCC): Chronic | ICD-10-CM

## 2020-10-21 DIAGNOSIS — M32.9 LUPUS (HCC): ICD-10-CM

## 2020-10-21 DIAGNOSIS — G47.01 INSOMNIA DUE TO MEDICAL CONDITION: ICD-10-CM

## 2020-10-21 PROCEDURE — 99214 OFFICE O/P EST MOD 30 MIN: CPT | Mod: 95 | Performed by: NURSE PRACTITIONER

## 2020-10-21 RX ORDER — MIRTAZAPINE 15 MG/1
15 TABLET, FILM COATED ORAL
Qty: 30 TAB | Refills: 0 | Status: SHIPPED | OUTPATIENT
Start: 2020-10-21 | End: 2020-11-16

## 2020-10-21 ASSESSMENT — FIBROSIS 4 INDEX: FIB4 SCORE: 1.2

## 2020-10-22 ENCOUNTER — PATIENT MESSAGE (OUTPATIENT)
Dept: MEDICAL GROUP | Facility: MEDICAL CENTER | Age: 70
End: 2020-10-22

## 2020-10-23 RX ORDER — FINASTERIDE 5 MG/1
5 TABLET, FILM COATED ORAL DAILY
Qty: 90 TAB | Refills: 1 | Status: SHIPPED | OUTPATIENT
Start: 2020-10-23 | End: 2021-03-15

## 2020-10-23 NOTE — TELEPHONE ENCOUNTER
From: Francesco Schulte  To: ALVINO Harman  Sent: 10/22/2020 3:26 PM PDT  Subject: Non-Urgent Medical Question    Somehow the prescription finasteride has been dropped for any refills according to [facility] can you please rewrite a prescription for a 90 day supply at 1 per day thank you

## 2020-10-26 ENCOUNTER — HOSPITAL ENCOUNTER (OUTPATIENT)
Dept: LAB | Facility: MEDICAL CENTER | Age: 70
End: 2020-10-26
Attending: INTERNAL MEDICINE
Payer: MEDICARE

## 2020-10-26 PROBLEM — G47.01 INSOMNIA DUE TO MEDICAL CONDITION: Status: ACTIVE | Noted: 2020-10-26

## 2020-10-26 LAB
ALBUMIN SERPL BCP-MCNC: 3.8 G/DL (ref 3.2–4.9)
ALBUMIN/GLOB SERPL: 0.8 G/DL
ALP SERPL-CCNC: 88 U/L (ref 30–99)
ALT SERPL-CCNC: 9 U/L (ref 2–50)
ANION GAP SERPL CALC-SCNC: 11 MMOL/L (ref 7–16)
ANISOCYTOSIS BLD QL SMEAR: ABNORMAL
APPEARANCE UR: CLEAR
AST SERPL-CCNC: 16 U/L (ref 12–45)
BACTERIA #/AREA URNS HPF: ABNORMAL /HPF
BASOPHILS # BLD AUTO: 0.2 % (ref 0–1.8)
BASOPHILS # BLD: 0.01 K/UL (ref 0–0.12)
BILIRUB SERPL-MCNC: 0.4 MG/DL (ref 0.1–1.5)
BILIRUB UR QL STRIP.AUTO: NEGATIVE
BUN SERPL-MCNC: 17 MG/DL (ref 8–22)
CALCIUM SERPL-MCNC: 9.5 MG/DL (ref 8.4–10.2)
CHLORIDE SERPL-SCNC: 104 MMOL/L (ref 96–112)
CO2 SERPL-SCNC: 24 MMOL/L (ref 20–33)
COLOR UR: YELLOW
COMMENT 1642: NORMAL
CREAT SERPL-MCNC: 1.03 MG/DL (ref 0.5–1.4)
EOSINOPHIL # BLD AUTO: 0.05 K/UL (ref 0–0.51)
EOSINOPHIL NFR BLD: 1.1 % (ref 0–6.9)
EPI CELLS #/AREA URNS HPF: NEGATIVE /HPF
ERYTHROCYTE [DISTWIDTH] IN BLOOD BY AUTOMATED COUNT: 78 FL (ref 35.9–50)
EST. AVERAGE GLUCOSE BLD GHB EST-MCNC: 174 MG/DL
GLOBULIN SER CALC-MCNC: 4.7 G/DL (ref 1.9–3.5)
GLUCOSE SERPL-MCNC: 211 MG/DL (ref 65–99)
GLUCOSE UR STRIP.AUTO-MCNC: NEGATIVE MG/DL
HBA1C MFR BLD: 7.7 % (ref 0–5.6)
HCT VFR BLD AUTO: 39.9 % (ref 42–52)
HGB BLD-MCNC: 12.3 G/DL (ref 14–18)
HYALINE CASTS #/AREA URNS LPF: ABNORMAL /LPF
IMM GRANULOCYTES # BLD AUTO: 0.04 K/UL (ref 0–0.11)
IMM GRANULOCYTES NFR BLD AUTO: 0.9 % (ref 0–0.9)
KETONES UR STRIP.AUTO-MCNC: NEGATIVE MG/DL
LEUKOCYTE ESTERASE UR QL STRIP.AUTO: ABNORMAL
LYMPHOCYTES # BLD AUTO: 0.34 K/UL (ref 1–4.8)
LYMPHOCYTES NFR BLD: 7.6 % (ref 22–41)
MACROCYTES BLD QL SMEAR: ABNORMAL
MCH RBC QN AUTO: 26.7 PG (ref 27–33)
MCHC RBC AUTO-ENTMCNC: 30.8 G/DL (ref 33.7–35.3)
MCV RBC AUTO: 86.6 FL (ref 81.4–97.8)
MICRO URNS: ABNORMAL
MICROCYTES BLD QL SMEAR: ABNORMAL
MONOCYTES # BLD AUTO: 0.29 K/UL (ref 0–0.85)
MONOCYTES NFR BLD AUTO: 6.5 % (ref 0–13.4)
NEUTROPHILS # BLD AUTO: 3.75 K/UL (ref 1.82–7.42)
NEUTROPHILS NFR BLD: 83.7 % (ref 44–72)
NITRITE UR QL STRIP.AUTO: POSITIVE
NRBC # BLD AUTO: 0 K/UL
NRBC BLD-RTO: 0 /100 WBC
OVALOCYTES BLD QL SMEAR: NORMAL
PH UR STRIP.AUTO: 6 [PH] (ref 5–8)
PLATELET # BLD AUTO: 234 K/UL (ref 164–446)
PLATELET BLD QL SMEAR: NORMAL
PMV BLD AUTO: 11.6 FL (ref 9–12.9)
POIKILOCYTOSIS BLD QL SMEAR: NORMAL
POTASSIUM SERPL-SCNC: 4 MMOL/L (ref 3.6–5.5)
PROT SERPL-MCNC: 8.5 G/DL (ref 6–8.2)
PROT UR QL STRIP: 30 MG/DL
RBC # BLD AUTO: 4.61 M/UL (ref 4.7–6.1)
RBC # URNS HPF: ABNORMAL /HPF
RBC BLD AUTO: PRESENT
RBC UR QL AUTO: ABNORMAL
SODIUM SERPL-SCNC: 139 MMOL/L (ref 135–145)
SP GR UR STRIP.AUTO: 1.02
WBC # BLD AUTO: 4.5 K/UL (ref 4.8–10.8)
WBC #/AREA URNS HPF: ABNORMAL /HPF

## 2020-10-26 PROCEDURE — 80053 COMPREHEN METABOLIC PANEL: CPT

## 2020-10-26 PROCEDURE — 36415 COLL VENOUS BLD VENIPUNCTURE: CPT

## 2020-10-26 PROCEDURE — 85025 COMPLETE CBC W/AUTO DIFF WBC: CPT

## 2020-10-26 PROCEDURE — 87077 CULTURE AEROBIC IDENTIFY: CPT

## 2020-10-26 PROCEDURE — 87086 URINE CULTURE/COLONY COUNT: CPT

## 2020-10-26 PROCEDURE — 87186 SC STD MICRODIL/AGAR DIL: CPT

## 2020-10-26 PROCEDURE — 83036 HEMOGLOBIN GLYCOSYLATED A1C: CPT | Mod: GA

## 2020-10-26 PROCEDURE — 81001 URINALYSIS AUTO W/SCOPE: CPT

## 2020-10-26 NOTE — PROGRESS NOTES
Chief Complaint   Patient presents with   • CHF (Systolic)   • Atrial Fibrillation   • Coronary Artery Disease     F/V Dx: Carotid stenosis, 90% right carotid & Stenosis of right carotid artery-Right CEA 3/21/18   • Orthostatic Hypotension       Subjective:   Bob Schulte is a 70 y.o. male patient of Dr. Liliana Song who presents today for hospital follow up.    Patient was admitted on 9/30/2020 with complaints of left sided chest pain. Echo showed small pericardial effusion likely causing his chest discomfort, there was no evidence of ACS. He was started on lisinopril for remodeling and continued on his midodrine. Patient also had acute encephalopathy and was found to be bacteremia with E coli with a positive UA that was unfortunately discarded before culture was completed. He was discharged on Omnicef X 14 days. He was discharged on 10/3/2020.     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     Today patient states that he is feeling significantly better since being discharged.  He continues to be followed by infectious disease for his recent bacteremia, there are plans for PICC placement with IV antibiotics for a minimum of 14 days.  From a cardiac standpoint patient denies having any significant concerns or complaints.  Patient is interested in having a repeat echocardiogram to ensure that his pericardial effusion has resolved.Per patient his BP has been averaging 110/70 without the use of midodrine. He does not believe he has been taking the lisinopril that was prescribed during hospitalization.      Past Medical History:   Diagnosis  Date   • Anesthesia     resistant to pain meds   • Cancer (HCC)     polycythemia vera   • Diabetes (HCC)     insulin and oral meds   • Family history of punctured lung 2002    left lung   • Heart attack (HCC) 03/2017   • Hemorrhagic disorder (HCC)     on blood thinners   • High cholesterol    • Ischemic cardiomyopathy    • Kidney stones     hx of kidney stones   • Orthostatic hypotension 3/29/2018   • Pain     headache pain   • Polycythemia vera(238.4)    • Stroke (HCC) 2016    r/t afib; eye issues resolved afterward   • Urinary bladder disorder     enlarged prostate, weak stream, difficulty urinating   • Vertigo      Past Surgical History:   Procedure Laterality Date   • SPLIT THICKNESS SKIN GRAFT N/A 8/23/2020    Procedure: APPLICATION, GRAFT, SKIN, SPLIT-THICKNESS;  Surgeon: Elisa Craig M.D.;  Location: [facility];  Service: Plastics   • SKIN ABSCESS INCISION AND DRAINAGE Right 8/3/2020    Procedure: INCISION AND DRAINAGE - SCROTAL WOUND - WOUND VAC PLACEMENT;  Surgeon: Jaylen Hayes M.D.;  Location: SURGERY [facility];  Service: Urology   • PB EXPLORE SCROTUM Right 7/31/2020    Procedure: EXPLORATION, SCROTUM - FOR WASH OUT OF SCROTAL WOUND & WOUND VAC PLACEMENT;  Surgeon: Ferny Rosales M.D.;  Location: [facility];  Service: Urology   • PB EXPLORE SCROTUM Right 7/29/2020    Procedure: EXPLORATION, SCROTUM - FOR I & D OF SCROTAL AND  GROIN WOUND;  Surgeon: Donta Viera M.D.;  Location: SURGERY [facility];  Service: Urology   • PB INCIS/DRAIN SCROTUM/TESTIS,EPIDIDYM Right 7/25/2020    Procedure: INCISION AND DRAINAGE, SCROTUM;  Surgeon: Nick Negro M.D.;  Location: SURGERY [facility];  Service: Urology   • TEMPORAL ARTERY BIOPSY Right 6/26/2018    Procedure: TEMPORAL ARTERY BIOPSY;  Surgeon: Rajendra Aguilar M.D.;  Location: SURGERY SAME DAY [facility];  Service: General   • CAROTID ENDARTERECTOMY Right 3/21/2018    Procedure: CAROTID  "ENDARTERECTOMY- W/EEG MONITORING;  Surgeon: Benny Morfin M.D.;  Location: SURGERY [facility];  Service: General   • APPENDECTOMY     • CATH PLACEMENT      right arm   • LAMINOTOMY      diskectomy; C4   • OTHER CARDIAC SURGERY      stents x 3     History reviewed. No pertinent family history.  Social History     Socioeconomic History   • Marital status:      Spouse name: Not on file   • Number of children: Not on file   • Years of education: Not on file   • Highest education level: Not on file   Occupational History   • Not on file   Social Needs   • Financial resource strain: Not on file   • Food insecurity     Worry: Not on file     Inability: Not on file   • Transportation needs     Medical: Not on file     Non-medical: Not on file   Tobacco Use   • Smoking status: Never Smoker   • Smokeless tobacco: Never Used   Substance and Sexual Activity   • Alcohol use: No   • Drug use: No   • Sexual activity: Not on file   Lifestyle   • Physical activity     Days per week: Not on file     Minutes per session: Not on file   • Stress: Not on file   Relationships   • Social connections     Talks on phone: Not on file     Gets together: Not on file     Attends Anabaptist service: Not on file     Active member of club or organization: Not on file     Attends meetings of clubs or organizations: Not on file     Relationship status: Not on file   • Intimate partner violence     Fear of current or ex partner: Not on file     Emotionally abused: Not on file     Physically abused: Not on file     Forced sexual activity: Not on file   Other Topics Concern   • Not on file   Social History Narrative   • Not on file     Allergies   Allergen Reactions   • Doxycycline      Swelling lips and difficulties swallowing   • Beta Adrenergic Blockers Unspecified     dizziness   • Metformin Unspecified and Palpitations     Cardiac effects  \"Similar to a heart attack, I was hospitalized\"   • Simvastatin      Other reaction(s): Other " (Specify with Comments)  Myalgias  Myalgias   • Statins [Hmg-Coa-R Inhibitors]      Muscle ache, joint pain     Outpatient Encounter Medications as of 10/28/2020   Medication Sig Dispense Refill   • finasteride (PROSCAR) 5 MG Tab Take 1 Tab by mouth every day. 90 Tab 1   • mirtazapine (REMERON) 15 MG Tab Take 1 Tab by mouth every bedtime. 30 Tab 0   • Insulin Degludec (TRESIBA FLEXTOUCH) 200 UNIT/ML Solution Pen-injector Inject 10 Units as instructed every evening. 1 Each 0   • hydrophilic ointment (AQUAPHOR) Ointment Apply 1 Each to affected area(s) 2 Times a Day.     • lisinopril (PRINIVIL) 2.5 MG Tab Take 1 Tab by mouth every day. 30 Tab 0   • hydroxyurea (HYDREA) 500 MG Cap Take 500 mg by mouth every evening. Pt started on 8/31/2020 for 30 day course     • hydrOXYzine HCl (ATARAX) 25 MG Tab Take 1-2 Tabs by mouth 3 times a day as needed for Anxiety (sleep). 30 Tab 0   • ondansetron (ZOFRAN) 8 MG Tab Take 1 Tab by mouth every 8 hours as needed for Nausea/Vomiting. 30 Tab 0   • apixaban (ELIQUIS) 5mg Tab Take 1 Tab by mouth 2 Times a Day. 180 Tab 0   • tamsulosin (FLOMAX) 0.4 MG capsule Take 2 Caps by mouth every day. 60 Cap 6   • gabapentin (NEURONTIN) 300 MG Cap Take 2 Caps by mouth 3 times a day. 90 Cap 0   • azaTHIOprine (IMURAN) 50 MG Tab Take 50 mg by mouth 2 Times a Day. TAKE 1 TABLET BY MOUTH TWICE A DAY     • naproxen (NAPROSYN) 250 MG Tab Take 500-750 mg by mouth 2 times a day as needed. Indications: Pain     • aspirin EC (ECOTRIN) 81 MG Tablet Delayed Response Take 1 Tab by mouth every day. 30 Tab    • therapeutic multivitamin-minerals (THERAGRAN-M) Tab Take 1 Tab by mouth every day.       No facility-administered encounter medications on file as of 10/28/2020.      Review of Systems   Constitutional: Positive for malaise/fatigue. Negative for weight loss.   Respiratory: Negative for shortness of breath.    Cardiovascular: Negative for chest pain, palpitations, orthopnea, claudication, leg swelling and  "PND.   Neurological: Negative for dizziness and weakness.   All other systems reviewed and are negative.       Objective:   /70 (BP Location: Left arm, Patient Position: Sitting, BP Cuff Size: Adult)   Pulse 86   Ht 1.905 m (6' 3\")   Wt 80.1 kg (176 lb 9.6 oz)   SpO2 98%   BMI 22.07 kg/m²     Physical Exam   Constitutional: He is oriented to person, place, and time. He appears well-developed and well-nourished. No distress.   HENT:   Head: Normocephalic.   Eyes: EOM are normal.   Neck: No JVD present.   Cardiovascular: Normal rate and regular rhythm.   Pulmonary/Chest: Effort normal and breath sounds normal.   Abdominal: Soft. There is no abdominal tenderness.   Musculoskeletal:         General: No edema.   Neurological: He is alert and oriented to person, place, and time.   Skin: Skin is warm and dry.   Psychiatric: He has a normal mood and affect. His behavior is normal.        Holter 10-9-19  Normal sinus rhythm   Occasional premature ventricular contractions (approximately 3.4% of total   beats)   No sustained arrhythmias noted      Echocardiogram:  8-  CONCLUSIONS  Prior echo 7/1/20, no significant changes are noted.  Left ventricular ejection fraction is visually estimated to be 30%.  Unable to estimate pulmonary artery pressure due to an inadequate tricuspid regurgitant jet.     Cardiac Catheterization:   DATE OF SERVICE:  03/11/2017     PROCEDURE:  Cardiac catheterization and Percutaneous Coronary Intervention.  A.  Left heart catheterization.  B.  Left ventriculography.  C.  Selective coronary angiography.  D.  Coronary aspiration thrombectomy of the left anterior descending artery.  E.  Coronary stent implantation of the proximal left anterior descending artery with a 4.0x8 mm drug-eluting Alpine Xience stent for in-stent restenosis.  F.  Coronary stent implantation of the mid left anterior descending artery with a 2.5x38 mm drug-eluting Xience Alpine stent post-dilated to 3.0 mm.  G. "  Right radial artery approach.     PREOPERATIVE DIAGNOSES:  1.  ST elevation myocardial infarction, anterior.  2.  Coronary artery disease with previous myocardial infarction and coronary   intervention in 2008 and 2010.  3.  Paroxysmal atrial fibrillation.  4.  Previous stroke, December 2016.  5.  Hyperlipidemia.  6.  Polycythemia rubra vera with thrombocytopenia.  7.  Diabetes mellitus.     POSTPROCEDURE DIAGNOSES:  1.  Subacute stent thrombosis.  2.  In-stent restenosis of the proximal left anterior descending artery stent.  3.  Severe diffuse obstructive coronary artery disease of the mid left anterior descending artery.     Stress Test: 1/6/2018  NUCLEAR IMAGING INTERPRETATION  Severe left ventriular dilation at rest.  No additional dilation noted with stress.  Chronic left bundle branch block at rest.  Extensive, large prior inferior infarct.  No evidence of ischemia.  Global hypokinesis with resting LVEF 20-30%.  ECG INTERPRETATION  Nondiagnostic.     Medical Decision Making:  Today's Assessment / Status / Plan:     CAD:  -Complex CAD S/P STEMI with multiple PCI (LAD, Lcs and POBA to the digonal).  -Denies Chest pain.  -Continue ASA 81 mg daily.     Carotid Artery Disease:  -S/P Right CEA in 2018.    HLD:  -LDL in 2019 was 94.  -Intolerant to statins, believes he has tried Zetia in the past.  -Will explore other options once patients acute infection has resolved.     Ischemic CM (EF 30%), NYHA Classs I-II:  -No evidence of acute heart failure, appears well compensated.   -Will need to consider possible ICD placement once infection has resolved.   -Unable to tolerate BB.  -Patient would like to finish his IV antibiotic treatments before starting the lisinopril.     Pericardial Effusion:  -Repeat limited TTE to re-evaluate.     PAF:  -Well controlled.   -OAC with Eliquis 5 mg BID.     Hypotension:  -Stable, BP averaging 110/70 off of midodrine.       Future Appointments   Date Time Provider Department Center    12/3/2020  8:45 AM [facility] ECHO 2 [facility]   12/17/2020 10:30 AM [facility] None

## 2020-10-26 NOTE — ASSESSMENT & PLAN NOTE
Still reporting difficulty getting comfortable for sleep, tossing and turning, waking frequently. Having some pain which he attributes to his lupus. He had tried hydroxyzine but did not find it to be helpful in terms of sleep. He would like an alternative medication

## 2020-10-26 NOTE — ASSESSMENT & PLAN NOTE
Recent flare contributing to hospitalization, pericardial effusion   He is also concerned with chronic fatigue- likely multifactoral

## 2020-10-26 NOTE — ASSESSMENT & PLAN NOTE
Echo during recent hospitalization reviewed, LVEF 30%, moderate pericardial effusion  Pain causing him to present to ER has improved at this time. Still feeling weak. He is tolerating low dose of lisinopril

## 2020-10-26 NOTE — PROGRESS NOTES
Telemedicine: Established Patient   This evaluation was conducted via [employer] using secure and encrypted videoconferencing technology. The patient was in a private location in the state of [place].    The patient's identity was confirmed and verbal consent was obtained for this virtual visit.    Subjective:   CC:   Chief Complaint   Patient presents with   • Follow-Up     hospital       Francesoc Schulte is a 70 y.o. male established patient with A-fib, polycythemia, lupus, DM type2 presenting viral virtual visit for hospital follow up. 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 Recovery has been complicated by chest pain and weakness prompting him to return to ER. He was determined to have UTI and lupus flare. Testing and hospital notes reviewed    Pt reports he is improving since hospital discharge. Slowly regaining energy. Chest pain has improved.    No fever, chills, nausea, syncope    Lupus (HCC)  Recent flare contributing to hospitalization, pericardial effusion   He is also concerned with chronic fatigue- likely multifactoral  Chronic systolic heart failure (HCC)  Echo during recent hospitalization reviewed, LVEF 30%, moderate pericardial effusion  Pain causing him to present to ER has improved at this time. Still feeling weak. He is tolerating low dose of lisinopril  Paroxysmal A-fib (HCC)  Continues on xarelto. Rate controlled, no betablockers  Insomnia due to medical condition  Still reporting difficulty getting comfortable for sleep, tossing and turning, waking frequently. Having some pain which he attributes to his lupus. He had tried hydroxyzine but did not find it to be helpful in terms of sleep. He would like an alternative medication  UTI  Pt reports UTI diagnosed during hospital eval. I am unable to locate a urine culture but do see he has been started on 14 day course of omnicef. Tolerating medication without difficulty. No dysuria at  "this time    ROS      Allergies   Allergen Reactions   • Doxycycline      Swelling lips and difficulties swallowing   • Beta Adrenergic Blockers Unspecified     dizziness   • Metformin Unspecified and Palpitations     Cardiac effects  \"Similar to a heart attack, I was hospitalized\"   • Simvastatin      Other reaction(s): Other (Specify with Comments)  Myalgias  Myalgias   • Statins [Hmg-Coa-R Inhibitors]      Muscle ache, joint pain       Current medicines (including changes today)  Current Outpatient Medications   Medication Sig Dispense Refill   • mirtazapine (REMERON) 15 MG Tab Take 1 Tab by mouth every bedtime. 30 Tab 0   • finasteride (PROSCAR) 5 MG Tab Take 1 Tab by mouth every day. 90 Tab 1   • Insulin Degludec (TRESIBA FLEXTOUCH) 200 UNIT/ML Solution Pen-injector Inject 10 Units as instructed every evening. 1 Each 0   • hydrophilic ointment (AQUAPHOR) Ointment Apply 1 Each to affected area(s) 2 Times a Day.     • lisinopril (PRINIVIL) 2.5 MG Tab Take 1 Tab by mouth every day. 30 Tab 0   • hydroxyurea (HYDREA) 500 MG Cap Take 500 mg by mouth every evening. Pt started on 8/31/2020 for 30 day course     • hydrOXYzine HCl (ATARAX) 25 MG Tab Take 1-2 Tabs by mouth 3 times a day as needed for Anxiety (sleep). 30 Tab 0   • ondansetron (ZOFRAN) 8 MG Tab Take 1 Tab by mouth every 8 hours as needed for Nausea/Vomiting. 30 Tab 0   • apixaban (ELIQUIS) 5mg Tab Take 1 Tab by mouth 2 Times a Day. 180 Tab 0   • tamsulosin (FLOMAX) 0.4 MG capsule Take 2 Caps by mouth every day. 60 Cap 6   • gabapentin (NEURONTIN) 300 MG Cap Take 2 Caps by mouth 3 times a day. 90 Cap 0   • azaTHIOprine (IMURAN) 50 MG Tab Take 50 mg by mouth 2 Times a Day. TAKE 1 TABLET BY MOUTH TWICE A DAY     • naproxen (NAPROSYN) 250 MG Tab Take 500-750 mg by mouth 2 times a day as needed. Indications: Pain     • aspirin EC (ECOTRIN) 81 MG Tablet Delayed Response Take 1 Tab by mouth every day. 30 Tab    • therapeutic multivitamin-minerals (THERAGRAN-M) Tab " Take 1 Tab by mouth every day.       No current facility-administered medications for this visit.        Patient Active Problem List    Diagnosis Date Noted   • Vertigo 08/09/2018     Priority: High   • Hypercoagulable state ([facility]) 03/21/2018     Priority: High   • Dizziness 03/10/2018     Priority: High   • History of ST elevation myocardial infarction (STEMI) 03/17/2017     Priority: High   • Coagulopathy ([facility]) 03/13/2017     Priority: High   • Chronic systolic heart failure ([facility]) 03/13/2017     Priority: High   • Thrombocytosis ([facility]) 03/12/2017     Priority: High   • STEMI (ST elevation myocardial infarction) ([facility]) 03/11/2017     Priority: High   • Chronic headache 03/21/2018     Priority: Medium   • History of stroke- old right parietal/occipital 03/10/2018     Priority: Medium   • Leukopenia 03/10/2018     Priority: Medium   • Carotid stenosis, 90% right carotid 03/10/2018     Priority: Medium   • Type 2 diabetes mellitus, with long-term current use of insulin ([facility]) 03/13/2017     Priority: Medium   • CAD (coronary artery disease) 03/13/2017     Priority: Medium   • Paroxysmal A-fib ([facility]) 03/12/2017     Priority: Medium   • Polycythemia 12/01/2016     Priority: Medium   • Stenosis of right carotid artery-Right CEA 3/21/18 03/21/2018     Priority: Low   • Chronic pain syndrome 09/13/2017     Priority: Low   • SIRS (systemic inflammatory response syndrome) ([facility]) 03/16/2017     Priority: Low   • Benign prostatic hyperplasia without lower urinary tract symptoms 03/12/2017     Priority: Low   • Dyslipidemia 03/12/2017     Priority: Low   • Statin intolerance 03/12/2017     Priority: Low   • Insomnia due to medical condition 10/26/2020   • Pericardial effusion 10/01/2020   • QT prolongation 09/30/2020   • Urethral irritation 09/24/2020   • Medication side effect 09/22/2020   • Nephrolithiasis 08/30/2020   • History of TIA (transient ischemic attack) 08/20/2020   • Advanced care planning/counseling discussion  08/16/2020   • Constipation 08/15/2020   • Age-related physical debility 08/07/2020   • SLE (systemic lupus erythematosus related syndrome) ([facility]) 07/25/2020   • Pancreatic cyst 07/25/2020   • Hyperglycemia 07/02/2020   • Lip swelling 07/01/2020   • Medication intolerance 07/01/2020   • Generalized pain 07/01/2020   • Elevated brain natriuretic peptide (BNP) level 07/01/2020   • Lupus ([facility]) 11/15/2019   • Encounter for long-term (current) use of insulin ([facility]) 05/06/2019   • Indigestion 04/25/2019   • Polycythemia vera ([facility]) 01/03/2019   • Multiple joint pain 10/03/2018   • Nausea 08/09/2018   • Elevated sed rate- R/O PMR 04/04/2018   • New daily persistent headache- R/O PMR; trial steroids 04/04/2018   • Orthostatic hypotension 03/29/2018   • Chronic daily headache 01/09/2018   • Agent orange exposure 09/13/2017   • Risk for falls 09/13/2017   • Long term (current) use of anticoagulants [Z79.01] 08/21/2017       No family history on file.    He  has a past medical history of Anesthesia, Cancer ([facility]), Diabetes ([facility]), Family history of punctured lung (2002), Heart attack ([facility]) (03/2017), Hemorrhagic disorder ([facility]), High cholesterol, Ischemic cardiomyopathy, Kidney stones, Orthostatic hypotension (3/29/2018), Pain, Polycythemia vera(238.4), Stroke ([facility]) (2016), Urinary bladder disorder, and Vertigo.  He  has a past surgical history that includes other cardiac surgery; cath placement; laminotomy; appendectomy; carotid endarterectomy (Right, 3/21/2018); temporal artery biopsy (Right, 6/26/2018); pr incis/drain scrotum/testis,epididym (Right, 7/25/2020); pr explore scrotum (Right, 7/29/2020); pr explore scrotum (Right, 7/31/2020); skin abscess incision and drainage (Right, 8/3/2020); and split thickness skin graft (N/A, 8/23/2020).       Objective:   /80   Temp 36.1 °C (97 °F)   Wt 79.4 kg (175 lb)   BMI 21.87 kg/m²     Physical Exam    Assessment and Plan:   The following treatment plan was discussed:     1.  Insomnia due to medical condition  continues having persistent sleep difficulty, requesting trial of medication. No success with hydroxyzine. May try:  - mirtazapine (REMERON) 15 MG Tab; Take 1 Tab by mouth every bedtime.  Dispense: 30 Tab; Refill: 0    2. Lupus (HCC)  Recent flare, pericardial effusion noted on echo. Pt is feeling somewhat improved at this time, consistently using medication. Will be following up with rheumatology    3. Chronic systolic heart failure (HCC)  Echo reviewed. Tolerating lisinopril without increased dizziness. Continue to monitor, continue close f/u with cardiology    4. Paroxysmal A-fib (HCC)  Stable    5. UTI  Pt to complete abx. Fluid intake discussed      Follow-up: 1-2 weeks, sooner as needed

## 2020-10-27 ENCOUNTER — PATIENT MESSAGE (OUTPATIENT)
Dept: MEDICAL GROUP | Facility: MEDICAL CENTER | Age: 70
End: 2020-10-27

## 2020-10-27 DIAGNOSIS — E11.59 TYPE 2 DIABETES MELLITUS WITH OTHER CIRCULATORY COMPLICATIONS (HCC): ICD-10-CM

## 2020-10-28 ENCOUNTER — PATIENT MESSAGE (OUTPATIENT)
Dept: MEDICAL GROUP | Facility: MEDICAL CENTER | Age: 70
End: 2020-10-28

## 2020-10-28 ENCOUNTER — OFFICE VISIT (OUTPATIENT)
Dept: CARDIOLOGY | Facility: MEDICAL CENTER | Age: 70
End: 2020-10-28
Payer: MEDICARE

## 2020-10-28 VITALS
HEIGHT: 75 IN | BODY MASS INDEX: 21.96 KG/M2 | DIASTOLIC BLOOD PRESSURE: 70 MMHG | HEART RATE: 86 BPM | OXYGEN SATURATION: 98 % | WEIGHT: 176.6 LBS | SYSTOLIC BLOOD PRESSURE: 132 MMHG

## 2020-10-28 DIAGNOSIS — I65.21 STENOSIS OF RIGHT CAROTID ARTERY: ICD-10-CM

## 2020-10-28 DIAGNOSIS — I31.39 PERICARDIAL EFFUSION: ICD-10-CM

## 2020-10-28 DIAGNOSIS — I25.10 CORONARY ARTERY DISEASE INVOLVING NATIVE CORONARY ARTERY OF NATIVE HEART WITHOUT ANGINA PECTORIS: ICD-10-CM

## 2020-10-28 DIAGNOSIS — I48.0 PAROXYSMAL A-FIB (HCC): Chronic | ICD-10-CM

## 2020-10-28 DIAGNOSIS — I48.0 PAF (PAROXYSMAL ATRIAL FIBRILLATION) (HCC): ICD-10-CM

## 2020-10-28 DIAGNOSIS — Z78.9 STATIN INTOLERANCE: Chronic | ICD-10-CM

## 2020-10-28 DIAGNOSIS — D75.1 POLYCYTHEMIA: ICD-10-CM

## 2020-10-28 DIAGNOSIS — E78.5 DYSLIPIDEMIA: ICD-10-CM

## 2020-10-28 DIAGNOSIS — Z78.9 MEDICATION INTOLERANCE: ICD-10-CM

## 2020-10-28 LAB
BACTERIA UR CULT: ABNORMAL
BACTERIA UR CULT: ABNORMAL
SIGNIFICANT IND 70042: ABNORMAL
SITE SITE: ABNORMAL
SOURCE SOURCE: ABNORMAL

## 2020-10-28 PROCEDURE — 99214 OFFICE O/P EST MOD 30 MIN: CPT | Performed by: NURSE PRACTITIONER

## 2020-10-28 ASSESSMENT — FIBROSIS 4 INDEX: FIB4 SCORE: 1.55

## 2020-10-28 NOTE — TELEPHONE ENCOUNTER
From: Francesco Schulte  To: ALVINO Harman  Sent: 10/28/2020 7:00 AM PDT  Subject: Non-Urgent Medical Question    Sleep yes however I can't urinate taking it      ----- Message -----   From:ALVINO Harman   Sent:10/27/2020 9:04 PM PDT   To:Francesco Schulte   Subject:RE: Non-Urgent Medical Question    Yes, I will start a new referral.  Is the remeron medication working better for sleep?      ----- Message -----   From:Francesco Schulte   Sent:10/27/2020 9:52 AM PDT   To:ALVINO Harman   Subject:Non-Urgent Medical Question    Can you please refer me to an endocrinologist upstairs as last year it was somewhat confusing when staff left and moved around I need to get back in there thank you

## 2020-10-29 ASSESSMENT — ENCOUNTER SYMPTOMS
DIZZINESS: 0
PND: 0
ORTHOPNEA: 0
WEIGHT LOSS: 0
SHORTNESS OF BREATH: 0
CLAUDICATION: 0
WEAKNESS: 0
PALPITATIONS: 0

## 2020-10-30 ENCOUNTER — HOSPITAL ENCOUNTER (OUTPATIENT)
Dept: RADIOLOGY | Facility: MEDICAL CENTER | Age: 70
End: 2020-10-30
Attending: INTERNAL MEDICINE
Payer: MEDICARE

## 2020-10-30 DIAGNOSIS — Z79.2 ENCOUNTER FOR LONG-TERM (CURRENT) USE OF ANTIBIOTICS: ICD-10-CM

## 2020-10-30 PROCEDURE — C1751 CATH, INF, PER/CENT/MIDLINE: HCPCS

## 2020-10-30 NOTE — PROGRESS NOTES
PICC Insertion Procedure Note    Consents confirmed, vessel patency confirmed with ultrasound, real time ultrasound image printed and placed in patient chart. Risks and benefits of procedure explained to patient/family and education regarding central line associated bloodstream infections provided. Questions answered.     PICC placed in RUE per MD order with ultrasound guidance. 4 Fr, single lumen PICC placed in basilic vein after 1 attempt(s). 3.0 cc's of 1% lidocaine injected intradermally, 21 gauge microintroducer needle and modified Seldinger technique used. 46 cm total catheter length, 0 cm external measurement inserted with good blood return. Each lumen flushed without resistance with 10 mL 0.9% normal saline. PICC line secured with Biopatch and Tegaderm.    PICC tip location in the SVC confirmed by ECG technology. Pt tolerated procedure well.  Patient condition relayed to unit RN or ordering physician via this post procedure note in the EMR.     Apportable Power PICC ref # [id number], Lot # [id number]

## 2020-11-02 ENCOUNTER — PATIENT MESSAGE (OUTPATIENT)
Dept: MEDICAL GROUP | Facility: MEDICAL CENTER | Age: 70
End: 2020-11-02

## 2020-11-02 DIAGNOSIS — E11.65 TYPE 2 DIABETES MELLITUS WITH HYPERGLYCEMIA, WITHOUT LONG-TERM CURRENT USE OF INSULIN (HCC): ICD-10-CM

## 2020-11-03 NOTE — TELEPHONE ENCOUNTER
From: Francesco Schulte  To: ALVINO Harman  Sent: 11/2/2020 4:51 PM PST  Subject: Non-Urgent Medical Question    Any luck on the endocrinologist diabetes referral?       ----- Message -----   From:ALVINO Harman   Sent:11/2/2020 4:48 PM PST   To:Francesco Schulte   Subject:RE: Non-Urgent Medical Question    I will send in a new prescription for the test strips.  Let me know if I can do anything else.  [place] GOLDEN      ----- Message -----   From:Francesco Schulte   Sent:11/2/2020 12:29 PM PST   To:ALVINO Harman   Subject:Non-Urgent Medical Question    VERIO TEST STRIPS  [facility] CLAIMS THEY HAVE NO PRESCRIPTION FOR STRIPS.I CANT BELIEVE AFTER ALL THESE YEARS...CAN YOU ORDER TILL I GET INTO ENDOCRINOLOGIST  ??? ALSO ALLL PAIN WAS UNOTHER EXTREME UTI ON INFUSIONS DR BROOKS 2WEEKS      ----- Message -----   From:ALVINO Harman   Sent:10/28/2020 7:04 AM PDT   To:Francesco Schulte   Subject:RE: Non-Urgent Medical Question    Well that's not good. He could try cutting it in half and see if it still effective for sleep without causing the urinary retention.      ----- Message -----   From:Francesco Schulte    Sent:10/28/2020 7:00 AM PDT   To:ALVINO Harman   Subject:Non-Urgent Medical Question    Sleep yes however I can't urinate taking it      ----- Message -----   From:ALVINO Harman   Sent:10/27/2020 9:04 PM PDT   To:Francesco Schulte   Subject:RE: Non-Urgent Medical Question    Yes, I will start a new referral.  Is the remeron medication working better for sleep?      ----- Message -----   From:Francesco Schulte   Sent:10/27/2020 9:52 AM PDT   To:ALVINO Harman   Subject:Non-Urgent Medical Question    Can you please refer me to an endocrinologist upstairs as last year it was somewhat confusing when staff left and moved around I need to get back in there thank you

## 2020-11-03 NOTE — TELEPHONE ENCOUNTER
From: Francesco Schulte  To: ALVINO Harman  Sent: 11/2/2020 12:29 PM PST  Subject: Non-Urgent Medical Question    VERIO TEST STRIPS  [facility] CLAIMS THEY HAVE NO PRESCRIPTION FOR STRIPS.I CANT BELIEVE AFTER ALL THESE YEARS...CAN YOU ORDER TILL I GET INTO ENDOCRINOLOGIST  ??? ALSO ALLL PAIN WAS UNOTHER EXTREME UTI ON INFUSIONS DR BROOKS 2WEEKS      ----- Message -----   From:ALVINO Harman    Sent:10/28/2020 7:04 AM PDT   To:Francesco Schulte   Subject:RE: Non-Urgent Medical Question    Well that's not good. He could try cutting it in half and see if it still effective for sleep without causing the urinary retention.      ----- Message -----   From:Francesco Schulte   Sent:10/28/2020 7:00 AM PDT   To:ALVINO Harman   Subject:Non-Urgent Medical Question    Sleep yes however I can't urinate taking it      ----- Message -----   From:ALVINO Harman   Sent:10/27/2020 9:04 PM PDT   To:Francesco Schulte   Subject:RE: Non-Urgent Medical Question    Yes, I will start a new referral.  Is the remeron medication working better for sleep?      ----- Message -----   From:Francesco Schulte   Sent:10/27/2020 9:52 AM PDT   To:ALVINO Harman   Subject:Non-Urgent Medical Question    Can you please refer me to an endocrinologist upstairs as last year it was somewhat confusing when staff left and moved around I need to get back in there thank you

## 2020-11-05 DIAGNOSIS — D70.2 DRUG-INDUCED NEUTROPENIA (HCC): ICD-10-CM

## 2020-11-06 ENCOUNTER — HOSPITAL ENCOUNTER (EMERGENCY)
Facility: MEDICAL CENTER | Age: 70
End: 2020-11-06
Attending: EMERGENCY MEDICINE | Admitting: EMERGENCY MEDICINE
Payer: MEDICARE

## 2020-11-06 ENCOUNTER — OUTPATIENT INFUSION SERVICES (OUTPATIENT)
Dept: ONCOLOGY | Facility: MEDICAL CENTER | Age: 70
End: 2020-11-06
Attending: INTERNAL MEDICINE
Payer: MEDICARE

## 2020-11-06 VITALS
HEIGHT: 72 IN | RESPIRATION RATE: 18 BRPM | BODY MASS INDEX: 24.84 KG/M2 | SYSTOLIC BLOOD PRESSURE: 109 MMHG | TEMPERATURE: 97.5 F | DIASTOLIC BLOOD PRESSURE: 71 MMHG | WEIGHT: 183.42 LBS | OXYGEN SATURATION: 98 % | HEART RATE: 107 BPM

## 2020-11-06 VITALS
WEIGHT: 183.42 LBS | DIASTOLIC BLOOD PRESSURE: 74 MMHG | TEMPERATURE: 97.4 F | BODY MASS INDEX: 23.54 KG/M2 | OXYGEN SATURATION: 95 % | RESPIRATION RATE: 20 BRPM | HEART RATE: 84 BPM | HEIGHT: 74 IN | SYSTOLIC BLOOD PRESSURE: 109 MMHG

## 2020-11-06 DIAGNOSIS — D70.2 DRUG-INDUCED NEUTROPENIA (HCC): ICD-10-CM

## 2020-11-06 DIAGNOSIS — R33.9 URINARY RETENTION: ICD-10-CM

## 2020-11-06 LAB
ALBUMIN SERPL BCP-MCNC: 3.2 G/DL (ref 3.2–4.9)
ALBUMIN/GLOB SERPL: 0.7 G/DL
ALP SERPL-CCNC: 84 U/L (ref 30–99)
ALT SERPL-CCNC: 11 U/L (ref 2–50)
ANION GAP SERPL CALC-SCNC: 11 MMOL/L (ref 7–16)
ANISOCYTOSIS BLD QL SMEAR: ABNORMAL
APPEARANCE UR: ABNORMAL
AST SERPL-CCNC: 19 U/L (ref 12–45)
BASOPHILS # BLD AUTO: 0.6 % (ref 0–1.8)
BASOPHILS # BLD: 0.02 K/UL (ref 0–0.12)
BILIRUB SERPL-MCNC: 0.3 MG/DL (ref 0.1–1.5)
BILIRUB UR QL STRIP.AUTO: ABNORMAL
BUN SERPL-MCNC: 13 MG/DL (ref 8–22)
CALCIUM SERPL-MCNC: 9.1 MG/DL (ref 8.4–10.2)
CHLORIDE SERPL-SCNC: 102 MMOL/L (ref 96–112)
CO2 SERPL-SCNC: 26 MMOL/L (ref 20–33)
COLOR UR: ABNORMAL
COMMENT 1642: NORMAL
CREAT SERPL-MCNC: 0.67 MG/DL (ref 0.5–1.4)
EOSINOPHIL # BLD AUTO: 0.08 K/UL (ref 0–0.51)
EOSINOPHIL NFR BLD: 2.2 % (ref 0–6.9)
ERYTHROCYTE [DISTWIDTH] IN BLOOD BY AUTOMATED COUNT: 75.7 FL (ref 35.9–50)
GLOBULIN SER CALC-MCNC: 4.3 G/DL (ref 1.9–3.5)
GLUCOSE SERPL-MCNC: 196 MG/DL (ref 65–99)
GLUCOSE UR STRIP.AUTO-MCNC: ABNORMAL MG/DL
HCT VFR BLD AUTO: 35 % (ref 42–52)
HGB BLD-MCNC: 11 G/DL (ref 14–18)
IMM GRANULOCYTES # BLD AUTO: 0.03 K/UL (ref 0–0.11)
IMM GRANULOCYTES NFR BLD AUTO: 0.8 % (ref 0–0.9)
KETONES UR STRIP.AUTO-MCNC: ABNORMAL MG/DL
LEUKOCYTE ESTERASE UR QL STRIP.AUTO: ABNORMAL
LYMPHOCYTES # BLD AUTO: 0.2 K/UL (ref 1–4.8)
LYMPHOCYTES NFR BLD: 5.6 % (ref 22–41)
MACROCYTES BLD QL SMEAR: ABNORMAL
MCH RBC QN AUTO: 27.4 PG (ref 27–33)
MCHC RBC AUTO-ENTMCNC: 31.4 G/DL (ref 33.7–35.3)
MCV RBC AUTO: 87.3 FL (ref 81.4–97.8)
MICRO URNS: ABNORMAL
MICROCYTES BLD QL SMEAR: ABNORMAL
MONOCYTES # BLD AUTO: 0.13 K/UL (ref 0–0.85)
MONOCYTES NFR BLD AUTO: 3.7 % (ref 0–13.4)
MUCOUS THREADS #/AREA URNS HPF: ABNORMAL /HPF
NEUTROPHILS # BLD AUTO: 3.1 K/UL (ref 1.82–7.42)
NEUTROPHILS NFR BLD: 87.1 % (ref 44–72)
NITRITE UR QL STRIP.AUTO: ABNORMAL
NRBC # BLD AUTO: 0 K/UL
NRBC BLD-RTO: 0 /100 WBC
OVALOCYTES BLD QL SMEAR: NORMAL
PH UR STRIP.AUTO: 7 [PH] (ref 5–8)
PLATELET # BLD AUTO: 270 K/UL (ref 164–446)
PLATELET BLD QL SMEAR: NORMAL
PMV BLD AUTO: 11.9 FL (ref 9–12.9)
POIKILOCYTOSIS BLD QL SMEAR: NORMAL
POTASSIUM SERPL-SCNC: 3.7 MMOL/L (ref 3.6–5.5)
PROT SERPL-MCNC: 7.5 G/DL (ref 6–8.2)
PROT UR QL STRIP: ABNORMAL MG/DL
RBC # BLD AUTO: 4.01 M/UL (ref 4.7–6.1)
RBC # URNS HPF: >150 /HPF
RBC BLD AUTO: PRESENT
RBC UR QL AUTO: ABNORMAL
SODIUM SERPL-SCNC: 139 MMOL/L (ref 135–145)
SP GR UR STRIP.AUTO: 1.01
WBC # BLD AUTO: 3.6 K/UL (ref 4.8–10.8)
WBC #/AREA URNS HPF: ABNORMAL /HPF

## 2020-11-06 PROCEDURE — 51702 INSERT TEMP BLADDER CATH: CPT

## 2020-11-06 PROCEDURE — 303105 HCHG CATHETER EXTRA

## 2020-11-06 PROCEDURE — 36415 COLL VENOUS BLD VENIPUNCTURE: CPT

## 2020-11-06 PROCEDURE — 85025 COMPLETE CBC W/AUTO DIFF WBC: CPT

## 2020-11-06 PROCEDURE — 80053 COMPREHEN METABOLIC PANEL: CPT

## 2020-11-06 PROCEDURE — 99283 EMERGENCY DEPT VISIT LOW MDM: CPT

## 2020-11-06 PROCEDURE — 700111 HCHG RX REV CODE 636 W/ 250 OVERRIDE (IP): Performed by: INTERNAL MEDICINE

## 2020-11-06 PROCEDURE — 306780 HCHG STAT FOR TRANSFUSION PER CASE

## 2020-11-06 PROCEDURE — 81001 URINALYSIS AUTO W/SCOPE: CPT

## 2020-11-06 PROCEDURE — 96372 THER/PROPH/DIAG INJ SC/IM: CPT

## 2020-11-06 RX ADMIN — FILGRASTIM-SNDZ 300 MCG: 300 INJECTION, SOLUTION INTRAVENOUS; SUBCUTANEOUS at 09:19

## 2020-11-06 ASSESSMENT — FIBROSIS 4 INDEX
FIB4 SCORE: 1.44
FIB4 SCORE: 1.55

## 2020-11-06 ASSESSMENT — PAIN DESCRIPTION - PAIN TYPE: TYPE: INTRACTABLE PAIN

## 2020-11-06 NOTE — DISCHARGE INSTRUCTIONS
You were seen in the ER for urinary retention.  Your labs did not reveal an acute abnormality that requires further work-up, consultation, or admission to the hospital.  The Andres catheter will need to stay in place until you follow-up with urology.  I have given you the contact information, please call them later today to make a follow-up appointment.  It appears that the antibiotics are working his urinalysis did not suggest an infection.  Continue going for your antibiotic infusion as scheduled.  Return immediately to the ER with new or worsening symptoms.  Good luck, I hope you feel better soon!

## 2020-11-06 NOTE — PROGRESS NOTES
Patient here for one time dose of filgrastim-sndz (Zarxio) injection. Patient went to ER recently for urinary retention. ANC on 11/6/20 = 3100. MD notified. Orders received to proceed with filgrastim-sndz (Zarxio) injection with today's ANC results. filgrastim-sndz (Zarxio) injection given per MAR; adhesive bandage applied to site. Patient to follow up with MD for future plan of care. Discharged to self care; no apparent distress noted.

## 2020-11-06 NOTE — ED NOTES
Patient is stable for discharge at this time, anticipatory guidance provided, close follow-up is encouraged, and ED return instructions have been detailed. Patient and wife are both agreeable to the disposition and plan and discharged home in ambulatory state and in good condition.     At-home go catheter care provided, Pt verbalized understanding;

## 2020-11-06 NOTE — ED PROVIDER NOTES
ED Provider Note    CHIEF COMPLAINT  Chief Complaint   Patient presents with   • Urinary Retention       HPI  Francesco Schulte is a 70 y.o. male who presents with a chief complaint of urinary retention that started approximately 2 days ago.  He was recently admitted to the hospital for urinary tract infection and was started on oral antibiotics.  He finished the course of antibiotics but after completion of the medication he still had symptoms which was urinary urgency and dysuria.  He was seen by an infectious disease physician and started on antibiotics through PICC line.  He goes daily and has completed 7 days of this.  He felt like his symptoms were improving until 2 days ago when he developed difficulty emptying his bladder.  Over the course of the past 2 days he has had only drips of urine.  He has had worsening pain in his suprapubic region but no fevers, nausea, vomiting, back pain.  Tonight, the pain became so significant that he presented to the ER for evaluation.    REVIEW OF SYSTEMS  See HPI for further details. Urinary retention. All other systems are negative.     PAST MEDICAL HISTORY   has a past medical history of Anesthesia, Cancer (Formerly [facility]), Diabetes ([facility]ly [facility]), Family history of punctured lung (2002), Heart attack ([facility]ly [facility]) (03/2017), Hemorrhagic disorder ([facility]ly [facility]), High cholesterol, Ischemic cardiomyopathy, Kidney stones, Orthostatic hypotension (3/29/2018), Pain, Polycythemia vera(238.4), Stroke ([facility]) (2016), Urinary bladder disorder, and Vertigo.    SOCIAL HISTORY  Social History     Tobacco Use   • Smoking status: Never Smoker   • Smokeless tobacco: Never Used   Substance and Sexual Activity   • Alcohol use: No   • Drug use: No   • Sexual activity: Not on file       SURGICAL HISTORY   has a past surgical history that includes other cardiac surgery; cath placement; laminotomy; appendectomy; carotid endarterectomy (Right, 3/21/2018); temporal artery biopsy (Right, 6/26/2018); incis/drain  "scrotum/testis,epididym (Right, 7/25/2020); explore scrotum (Right, 7/29/2020); explore scrotum (Right, 7/31/2020); skin abscess incision and drainage (Right, 8/3/2020); and split thickness skin graft (N/A, 8/23/2020).    CURRENT MEDICATIONS  Home Medications    **Home medications have not yet been reviewed for this encounter**         ALLERGIES  Allergies   Allergen Reactions   • Doxycycline      Swelling lips and difficulties swallowing   • Beta Adrenergic Blockers Unspecified     dizziness   • Metformin Unspecified and Palpitations     Cardiac effects  \"Similar to a heart attack, I was hospitalized\"   • Simvastatin      Other reaction(s): Other (Specify with Comments)  Myalgias  Myalgias   • Statins [Hmg-Coa-R Inhibitors]      Muscle ache, joint pain       PHYSICAL EXAM  VITAL SIGNS: /75   Pulse (!) 116   Temp 36.3 °C (97.4 °F) (Temporal)   Resp 18   Ht 1.88 m (6' 2\")   Wt 83.2 kg (183 lb 6.8 oz)   SpO2 96%   BMI 23.55 kg/m²     Pulse ox interpretation: I interpret this pulse ox as normal.  Constitutional: Alert in no apparent distress.  HENT: No signs of trauma, Bilateral external ears normal, Nose normal.  Moist mucous membranes.  Eyes: Pupils are equal and reactive, Conjunctiva normal, Non-icteric.   Neck: Normal range of motion, No tenderness, Supple, No stridor.   Lymphatic: No lymphadenopathy noted.   Cardiovascular: Regular rate and rhythm, no murmurs. Pulses symmetrical.  Thorax & Lungs: Normal breath sounds, No respiratory distress, No wheezing, No chest tenderness.   Abdomen: Bowel sounds normal, Soft, No tenderness, No masses, No pulsatile masses. No peritoneal signs.  Skin: Warm, Dry, No erythema, No rash.   Back: No bony tenderness, no CVA tenderness.   Extremities: Intact distal pulses, No edema, No tenderness, No cyanosis.  Musculoskeletal: Good range of motion in all major joints. No tenderness to palpation or major deformities noted.   Neurologic: Alert, Normal motor function, Normal " sensory function, No focal deficits noted.   Psychiatric: Affect normal, Judgment normal, Mood normal.     DIAGNOSTIC STUDIES / PROCEDURES    LABS  Results for orders placed or performed during the hospital encounter of 11/06/20   URINALYSIS,CULTURE IF INDICATED    Specimen: Urine, Andres Cath   Result Value Ref Range    Color Red (A)     Character Bloody (A)     Specific Gravity 1.010 <1.035    Ph 7.0 5.0 - 8.0    Glucose See Comment Negative mg/dL    Ketones See Comment Negative mg/dL    Protein See Comment Negative mg/dL    Bilirubin See Comment Negative    Nitrite See Comment Negative    Leukocyte Esterase See Comment Negative    Occult Blood Moderate (A) Negative    Micro Urine Req Microscopic    CBC WITH DIFFERENTIAL   Result Value Ref Range    WBC 3.6 (L) 4.8 - 10.8 K/uL    RBC 4.01 (L) 4.70 - 6.10 M/uL    Hemoglobin 11.0 (L) 14.0 - 18.0 g/dL    Hematocrit 35.0 (L) 42.0 - 52.0 %    MCV 87.3 81.4 - 97.8 fL    MCH 27.4 27.0 - 33.0 pg    MCHC 31.4 (L) 33.7 - 35.3 g/dL    RDW 75.7 (H) 35.9 - 50.0 fL    Platelet Count 270 164 - 446 K/uL    MPV 11.9 9.0 - 12.9 fL    Neutrophils-Polys 87.10 (H) 44.00 - 72.00 %    Lymphocytes 5.60 (L) 22.00 - 41.00 %    Monocytes 3.70 0.00 - 13.40 %    Eosinophils 2.20 0.00 - 6.90 %    Basophils 0.60 0.00 - 1.80 %    Immature Granulocytes 0.80 0.00 - 0.90 %    Nucleated RBC 0.00 /100 WBC    Neutrophils (Absolute) 3.10 1.82 - 7.42 K/uL    Lymphs (Absolute) 0.20 (L) 1.00 - 4.80 K/uL    Monos (Absolute) 0.13 0.00 - 0.85 K/uL    Eos (Absolute) 0.08 0.00 - 0.51 K/uL    Baso (Absolute) 0.02 0.00 - 0.12 K/uL    Immature Granulocytes (abs) 0.03 0.00 - 0.11 K/uL    NRBC (Absolute) 0.00 K/uL    Anisocytosis 2+ (A)     Macrocytosis 1+     Microcytosis 1+    Comp Metabolic Panel   Result Value Ref Range    Sodium 139 135 - 145 mmol/L    Potassium 3.7 3.6 - 5.5 mmol/L    Chloride 102 96 - 112 mmol/L    Co2 26 20 - 33 mmol/L    Anion Gap 11.0 7.0 - 16.0    Glucose 196 (H) 65 - 99 mg/dL    Bun 13 8  - 22 mg/dL    Creatinine 0.67 0.50 - 1.40 mg/dL    Calcium 9.1 8.4 - 10.2 mg/dL    AST(SGOT) 19 12 - 45 U/L    ALT(SGPT) 11 2 - 50 U/L    Alkaline Phosphatase 84 30 - 99 U/L    Total Bilirubin 0.3 0.1 - 1.5 mg/dL    Albumin 3.2 3.2 - 4.9 g/dL    Total Protein 7.5 6.0 - 8.2 g/dL    Globulin 4.3 (H) 1.9 - 3.5 g/dL    A-G Ratio 0.7 g/dL   URINE MICROSCOPIC (W/UA)   Result Value Ref Range    WBC 0-2 (A) /hpf    RBC >150 (A) /hpf    Mucous Threads Few /hpf   ESTIMATED GFR   Result Value Ref Range    GFR If African American >60 >60 mL/min/1.73 m 2    GFR If Non African American >60 >60 mL/min/1.73 m 2   PLATELET ESTIMATE   Result Value Ref Range    Plt Estimation Normal    MORPHOLOGY   Result Value Ref Range    RBC Morphology Present     Poikilocytosis 1+     Ovalocytes 1+    DIFFERENTIAL COMMENT   Result Value Ref Range    Comments-Diff see below          COURSE & MEDICAL DECISION MAKING  Pertinent Labs & Imaging studies reviewed. (See chart for details)  Records obtained and reviewed: Patient was most recently seen by his cardiologist in the outpatient setting 10/28/2020 for hospital follow-up.  He was admitted 9/30/2020 for left-sided chest pain.  An echocardiogram revealed a small pericardial effusion likely causing his chest discomfort, there was no evidence of ACS.  He was started on lisinopril and continued on midodrine.  He also had acute encephalopathy and was found to be bacteremic with E. coli and a positive UA that was discarded before culture was completed.  He was discharged on 10/3/2020 with a prescription for Omnicef x14 days.    This is a 70-year-old immunosuppressed male here with urinary retention for the past 2 days.  Upon arrival he is tachycardic and has suprapubic discomfort.  A Andres catheter was placed after a bladder scan revealed greater than 1 L of retained urine.  The catheter was placed easily and drained 1.1 L of orange urine with complete resolution of symptoms.  His abdomen is soft without  tenderness.  He has no CVA tenderness or fever to suggest pyelonephritis.  Renal function is well within normal limits.  Urinalysis does not suggest infection.    Patient was reevaluated, his symptoms are completely resolved.  Following insertion of the catheter, his heart rate improved to normal.  He has no renal injury.  It appears that the antibiotics through the PICC line are working.  He is safe for discharge with catheter in place and close urology follow-up.  Patient is comfortable with this plan.  He was discharged in good and stable condition with scheduled follow-up later today both with the infusion center for antibiotics and for medication to increase his white blood cell count.    The patient will return for worsening symptoms and is stable at the time of discharge. The patient verbalizes understanding and will comply.    FINAL IMPRESSION  1. Urinary retention           Electronically signed by: Gregory Hudson M.D., 11/6/2020 1:07 AM

## 2020-11-06 NOTE — ED NOTES
Leg bag provided, education provided, Pt denies having any questions and verbalized understanding;

## 2020-11-10 ENCOUNTER — HOSPITAL ENCOUNTER (OUTPATIENT)
Dept: LAB | Facility: MEDICAL CENTER | Age: 70
End: 2020-11-10
Attending: INTERNAL MEDICINE
Payer: MEDICARE

## 2020-11-10 LAB
ALBUMIN SERPL BCP-MCNC: 3.1 G/DL (ref 3.2–4.9)
ALBUMIN/GLOB SERPL: 0.7 G/DL
ALP SERPL-CCNC: 83 U/L (ref 30–99)
ALT SERPL-CCNC: 10 U/L (ref 2–50)
ANION GAP SERPL CALC-SCNC: 12 MMOL/L (ref 7–16)
AST SERPL-CCNC: 19 U/L (ref 12–45)
BASOPHILS # BLD AUTO: 0.5 % (ref 0–1.8)
BASOPHILS # BLD: 0.02 K/UL (ref 0–0.12)
BILIRUB SERPL-MCNC: 0.4 MG/DL (ref 0.1–1.5)
BUN SERPL-MCNC: 13 MG/DL (ref 8–22)
C3 SERPL-MCNC: 54.2 MG/DL (ref 87–200)
C4 SERPL-MCNC: 4.4 MG/DL (ref 19–52)
CALCIUM SERPL-MCNC: 8.9 MG/DL (ref 8.4–10.2)
CHLORIDE SERPL-SCNC: 107 MMOL/L (ref 96–112)
CO2 SERPL-SCNC: 22 MMOL/L (ref 20–33)
CREAT SERPL-MCNC: 0.76 MG/DL (ref 0.5–1.4)
CRP SERPL HS-MCNC: 0.42 MG/DL (ref 0–0.75)
EOSINOPHIL # BLD AUTO: 0.09 K/UL (ref 0–0.51)
EOSINOPHIL NFR BLD: 2.2 % (ref 0–6.9)
ERYTHROCYTE [DISTWIDTH] IN BLOOD BY AUTOMATED COUNT: 79 FL (ref 35.9–50)
ERYTHROCYTE [SEDIMENTATION RATE] IN BLOOD BY WESTERGREN METHOD: 75 MM/HOUR (ref 0–20)
GLOBULIN SER CALC-MCNC: 4.5 G/DL (ref 1.9–3.5)
GLUCOSE SERPL-MCNC: 211 MG/DL (ref 65–99)
HCT VFR BLD AUTO: 37.8 % (ref 42–52)
HGB BLD-MCNC: 12 G/DL (ref 14–18)
IMM GRANULOCYTES # BLD AUTO: 0.03 K/UL (ref 0–0.11)
IMM GRANULOCYTES NFR BLD AUTO: 0.7 % (ref 0–0.9)
LYMPHOCYTES # BLD AUTO: 0.35 K/UL (ref 1–4.8)
LYMPHOCYTES NFR BLD: 8.6 % (ref 22–41)
MCH RBC QN AUTO: 27.8 PG (ref 27–33)
MCHC RBC AUTO-ENTMCNC: 31.7 G/DL (ref 33.7–35.3)
MCV RBC AUTO: 87.5 FL (ref 81.4–97.8)
MONOCYTES # BLD AUTO: 0.28 K/UL (ref 0–0.85)
MONOCYTES NFR BLD AUTO: 6.9 % (ref 0–13.4)
NEUTROPHILS # BLD AUTO: 3.29 K/UL (ref 1.82–7.42)
NEUTROPHILS NFR BLD: 81.1 % (ref 44–72)
NRBC # BLD AUTO: 0 K/UL
NRBC BLD-RTO: 0 /100 WBC
PLATELET # BLD AUTO: 396 K/UL (ref 164–446)
PMV BLD AUTO: 9.9 FL (ref 9–12.9)
POTASSIUM SERPL-SCNC: 3.9 MMOL/L (ref 3.6–5.5)
PROT SERPL-MCNC: 7.6 G/DL (ref 6–8.2)
RBC # BLD AUTO: 4.32 M/UL (ref 4.7–6.1)
SODIUM SERPL-SCNC: 141 MMOL/L (ref 135–145)
WBC # BLD AUTO: 4.1 K/UL (ref 4.8–10.8)

## 2020-11-10 PROCEDURE — 85025 COMPLETE CBC W/AUTO DIFF WBC: CPT

## 2020-11-10 PROCEDURE — 85652 RBC SED RATE AUTOMATED: CPT

## 2020-11-10 PROCEDURE — 86140 C-REACTIVE PROTEIN: CPT

## 2020-11-10 PROCEDURE — 80053 COMPREHEN METABOLIC PANEL: CPT

## 2020-11-10 PROCEDURE — 36415 COLL VENOUS BLD VENIPUNCTURE: CPT

## 2020-11-10 PROCEDURE — 86160 COMPLEMENT ANTIGEN: CPT | Mod: 91

## 2020-11-13 DIAGNOSIS — G47.01 INSOMNIA DUE TO MEDICAL CONDITION: ICD-10-CM

## 2020-11-13 NOTE — TELEPHONE ENCOUNTER
Received request via: Pharmacy    Was the patient seen in the last year in this department? Yes    Does the patient have an active prescription (recently filled or refills available) for medication(s) requested? No     Requested Prescriptions     Pending Prescriptions Disp Refills   • mirtazapine (REMERON) 15 MG Tab [Pharmacy Med Name: MIRTAZAPINE 15 MG TABLET] 30 Tab 0     Sig: TAKE 1 TABLET BY MOUTH EVERYDAY AT BEDTIME

## 2020-11-16 RX ORDER — MIRTAZAPINE 15 MG/1
TABLET, FILM COATED ORAL
Qty: 30 TAB | Refills: 3 | Status: SHIPPED | OUTPATIENT
Start: 2020-11-16 | End: 2021-01-14

## 2020-11-25 ENCOUNTER — TELEPHONE (OUTPATIENT)
Dept: ENDOCRINOLOGY | Facility: MEDICAL CENTER | Age: 70
End: 2020-11-25

## 2020-12-03 ENCOUNTER — HOSPITAL ENCOUNTER (OUTPATIENT)
Dept: CARDIOLOGY | Facility: MEDICAL CENTER | Age: 70
End: 2020-12-03
Attending: NURSE PRACTITIONER
Payer: MEDICARE

## 2020-12-03 DIAGNOSIS — I31.39 PERICARDIAL EFFUSION: ICD-10-CM

## 2020-12-03 LAB
LV EJECT FRACT  99904: 20
LV EJECT FRACT MOD 2C 99903: 34.84
LV EJECT FRACT MOD 4C 99902: 37.79
LV EJECT FRACT MOD BP 99901: 41.1

## 2020-12-03 PROCEDURE — 93321 DOPPLER ECHO F-UP/LMTD STD: CPT

## 2020-12-03 PROCEDURE — 93308 TTE F-UP OR LMTD: CPT | Mod: 26 | Performed by: INTERNAL MEDICINE

## 2020-12-03 RX ORDER — LISINOPRIL 2.5 MG/1
2.5 TABLET ORAL DAILY
Qty: 30 TAB | Refills: 3 | Status: SHIPPED | OUTPATIENT
Start: 2020-12-03 | End: 2020-12-11 | Stop reason: SDUPTHER

## 2020-12-03 NOTE — PROGRESS NOTES
Called patient and discussed echocardiogram results showing decreased effusion and further reduction in LVEF of 20%. We discussed that this reduction in LVEF increases his risk for life threatening arrhythmias and possible need for ICD in 2-3 months if his LVEF fails to improve with medical therapy. I offered the option of life vest for protectionwhile we are trying to get his medical therapy optimized, he declined as he is feeling well and feels that wearing a vest all the time would not work for him.     Patient reports that his BP has been doing very well, some days his SBP has been in the 150's-160's in the afternoon, he reduced his Midodrine to once daily.     We will start lisinopril 2.5 mg daily. Encouraged patient to continue monitoring his BP closely. Follow up with me as previously scheduled on 12/17/2020.

## 2020-12-04 ENCOUNTER — PATIENT MESSAGE (OUTPATIENT)
Dept: MEDICAL GROUP | Facility: MEDICAL CENTER | Age: 70
End: 2020-12-04

## 2020-12-04 DIAGNOSIS — Z79.4 ENCOUNTER FOR LONG-TERM (CURRENT) USE OF INSULIN (HCC): ICD-10-CM

## 2020-12-04 RX ORDER — INSULIN DEGLUDEC 200 U/ML
10 INJECTION, SOLUTION SUBCUTANEOUS EVERY EVENING
Qty: 3 ML | Refills: 0 | Status: SHIPPED | OUTPATIENT
Start: 2020-12-04 | End: 2021-01-04 | Stop reason: SDUPTHER

## 2020-12-11 ENCOUNTER — OFFICE VISIT (OUTPATIENT)
Dept: CARDIOLOGY | Facility: MEDICAL CENTER | Age: 70
End: 2020-12-11
Payer: MEDICARE

## 2020-12-11 VITALS
HEART RATE: 87 BPM | OXYGEN SATURATION: 100 % | SYSTOLIC BLOOD PRESSURE: 132 MMHG | HEIGHT: 72 IN | WEIGHT: 193.2 LBS | RESPIRATION RATE: 16 BRPM | DIASTOLIC BLOOD PRESSURE: 78 MMHG | BODY MASS INDEX: 26.17 KG/M2

## 2020-12-11 DIAGNOSIS — D75.1 POLYCYTHEMIA: ICD-10-CM

## 2020-12-11 DIAGNOSIS — I65.21 STENOSIS OF RIGHT CAROTID ARTERY: ICD-10-CM

## 2020-12-11 DIAGNOSIS — I25.10 CORONARY ARTERY DISEASE INVOLVING NATIVE CORONARY ARTERY OF NATIVE HEART WITHOUT ANGINA PECTORIS: ICD-10-CM

## 2020-12-11 DIAGNOSIS — I31.39 PERICARDIAL EFFUSION: ICD-10-CM

## 2020-12-11 DIAGNOSIS — Z78.9 STATIN INTOLERANCE: ICD-10-CM

## 2020-12-11 DIAGNOSIS — I44.7 INCOMPLETE LEFT BUNDLE BRANCH BLOCK: ICD-10-CM

## 2020-12-11 DIAGNOSIS — I50.22 CHRONIC SYSTOLIC HEART FAILURE (HCC): ICD-10-CM

## 2020-12-11 DIAGNOSIS — D68.9 COAGULOPATHY (HCC): ICD-10-CM

## 2020-12-11 PROCEDURE — 99214 OFFICE O/P EST MOD 30 MIN: CPT | Performed by: NURSE PRACTITIONER

## 2020-12-11 RX ORDER — LISINOPRIL 2.5 MG/1
5 TABLET ORAL DAILY
Qty: 180 TAB | Refills: 3 | Status: SHIPPED | OUTPATIENT
Start: 2020-12-11 | End: 2021-04-13

## 2020-12-11 RX ORDER — METOPROLOL SUCCINATE 25 MG/1
12.5 TABLET, EXTENDED RELEASE ORAL EVERY EVENING
Qty: 30 TAB | Refills: 3 | Status: ON HOLD | OUTPATIENT
Start: 2020-12-11 | End: 2021-01-03 | Stop reason: SDUPTHER

## 2020-12-11 RX ORDER — BLOOD SUGAR DIAGNOSTIC
STRIP MISCELLANEOUS
COMMUNITY
Start: 2020-12-03 | End: 2021-01-01

## 2020-12-11 ASSESSMENT — ENCOUNTER SYMPTOMS
DIZZINESS: 0
WEIGHT LOSS: 0
SHORTNESS OF BREATH: 0
PALPITATIONS: 0
CLAUDICATION: 0
PND: 0
WEAKNESS: 0
ORTHOPNEA: 0

## 2020-12-11 ASSESSMENT — FIBROSIS 4 INDEX: FIB4 SCORE: 1.03

## 2020-12-11 NOTE — PROGRESS NOTES
Chief Complaint   Patient presents with   • Pericardial Effusion       Subjective:   Bob Schulte is a 70 y.o. male patient of Dr. Liliana Song who presents today for follow up regarding his ICM.     Patient was last seen by myself on 10/28/2020 for hospital follow up.     Patient was admitted on 9/30/2020 with complaints of left sided chest pain. Echo showed small pericardial effusion likely causing his chest discomfort, there was no evidence of ACS. He was started on lisinopril for remodeling and continued on his midodrine. Patient also had acute encephalopathy and was found to be bacteremia with E coli with a positive UA that was unfortunately discarded before culture was completed. He was discharged on Omnicef X 14 days. He was discharged on 10/3/2020.     Repeat limited TTE was ordered which showed decreased pericardial effusion and further reduction in LVEF of 20%. We discussed that this reduction in LVEF increases his risk for life threatening arrhythmias and possible need for ICD in 2-3 months if his LVEF fails to improve with medical therapy. I offered the option of life vest for protectionwhile we are trying to get his medical therapy optimized, he declined as he is feeling well and feels that wearing a vest all the time would not work for him.      Patient reports that his BP has been doing very well, some days his SBP has been in the 150's-160's in the afternoon, he reduced his Midodrine to once daily. Lisinopril 2.5 mg daily was started.      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     Today patient states that he is  feeling better and better every day. He has been tolerating the lisinopril. BP is averaging in the 130's in the am and up to the 160's in the evening. He is still taking midodrine once per day.     Past Medical History:   Diagnosis Date   • Anesthesia     resistant to pain meds   • Cancer (HCC)     polycythemia vera   • Diabetes (HCC)     insulin and oral meds   • Family history of punctured lung 2002    left lung   • Heart attack (HCC) 03/2017   • Hemorrhagic disorder (HCC)     on blood thinners   • High cholesterol    • Ischemic cardiomyopathy    • Kidney stones     hx of kidney stones   • Orthostatic hypotension 3/29/2018   • Pain     headache pain   • Polycythemia vera(238.4)    • Stroke (HCC) 2016    r/t afib; eye issues resolved afterward   • Urinary bladder disorder     enlarged prostate, weak stream, difficulty urinating   • Vertigo      Past Surgical History:   Procedure Laterality Date   • SPLIT THICKNESS SKIN GRAFT N/A 8/23/2020    Procedure: APPLICATION, GRAFT, SKIN, SPLIT-THICKNESS;  Surgeon: Elisa Craig M.D.;  Location: [facility];  Service: Plastics   • SKIN ABSCESS INCISION AND DRAINAGE Right 8/3/2020    Procedure: INCISION AND DRAINAGE - SCROTAL WOUND - WOUND VAC PLACEMENT;  Surgeon: Jaylen Hayes M.D.;  Location: [facility];  Service: Urology   • PB EXPLORE SCROTUM Right 7/31/2020    Procedure: EXPLORATION, SCROTUM - FOR WASH OUT OF SCROTAL WOUND & WOUND VAC PLACEMENT;  Surgeon: Ferny Rosales M.D.;  Location: [facility];  Service: Urology   • PB EXPLORE SCROTUM Right 7/29/2020    Procedure: EXPLORATION, SCROTUM - FOR I & D OF SCROTAL AND  GROIN WOUND;  Surgeon: Donta Viera M.D.;  Location: [facility];  Service: Urology   • PB INCIS/DRAIN SCROTUM/TESTIS,EPIDIDYM Right 7/25/2020    Procedure: INCISION AND DRAINAGE, SCROTUM;  Surgeon: Nick Negro M.D.;  Location: [facility];  Service: Urology   •  TEMPORAL ARTERY BIOPSY Right 6/26/2018    Procedure: TEMPORAL ARTERY BIOPSY;  Surgeon: Rajendra Aguilar M.D.;  Location: SURGERY SAME DAY [facility];  Service: General   • CAROTID ENDARTERECTOMY Right 3/21/2018    Procedure: CAROTID ENDARTERECTOMY- W/EEG MONITORING;  Surgeon: Benny Morfin M.D.;  Location: SURGERY [facility];  Service: General   • APPENDECTOMY     • CATH PLACEMENT      right arm   • LAMINOTOMY      diskectomy; C4   • OTHER CARDIAC SURGERY      stents x 3     History reviewed. No pertinent family history.  Social History     Socioeconomic History   • Marital status:      Spouse name: Not on file   • Number of children: Not on file   • Years of education: Not on file   • Highest education level: Not on file   Occupational History   • Not on file   Social Needs   • Financial resource strain: Not on file   • Food insecurity     Worry: Not on file     Inability: Not on file   • Transportation needs     Medical: Not on file     Non-medical: Not on file   Tobacco Use   • Smoking status: Never Smoker   • Smokeless tobacco: Never Used   Substance and Sexual Activity   • Alcohol use: No   • Drug use: No   • Sexual activity: Not on file   Lifestyle   • Physical activity     Days per week: Not on file     Minutes per session: Not on file   • Stress: Not on file   Relationships   • Social connections     Talks on phone: Not on file     Gets together: Not on file     Attends Christian service: Not on file     Active member of club or organization: Not on file     Attends meetings of clubs or organizations: Not on file     Relationship status: Not on file   • Intimate partner violence     Fear of current or ex partner: Not on file     Emotionally abused: Not on file     Physically abused: Not on file     Forced sexual activity: Not on file   Other Topics Concern   • Not on file   Social History Narrative   • Not on file     Allergies   Allergen Reactions   • Doxycycline      Swelling lips and  "difficulties swallowing   • Beta Adrenergic Blockers Unspecified     dizziness   • Metformin Unspecified and Palpitations     Cardiac effects  \"Similar to a heart attack, I was hospitalized\"   • Simvastatin      Other reaction(s): Other (Specify with Comments)  Myalgias  Myalgias   • Statins [Hmg-Coa-R Inhibitors]      Muscle ache, joint pain     Outpatient Encounter Medications as of 12/11/2020   Medication Sig Dispense Refill   • ONETOUCH VERIO strip VERIO TEST STRIPS FOR GLUCOSE MONITORING 3 TIMES A DAY AND AS NEEDED *INS COVERS 100 PER 90 DAY     • metoprolol SR (TOPROL XL) 25 MG TABLET SR 24 HR Take 0.5 Tabs by mouth every evening. 30 Tab 3   • lisinopril (PRINIVIL) 2.5 MG Tab Take 2 Tabs by mouth every day. 180 Tab 3   • Insulin Degludec (TRESIBA FLEXTOUCH) 200 UNIT/ML Solution Pen-injector Inject 10 Units under the skin every evening. 3 mL 0   • Blood Glucose Test Strips verio test strips for glucose monitoring TID and  Each 5   • finasteride (PROSCAR) 5 MG Tab Take 1 Tab by mouth every day. 90 Tab 1   • hydroxyurea (HYDREA) 500 MG Cap Take 500 mg by mouth every evening. Pt started on 8/31/2020 for 30 day course     • apixaban (ELIQUIS) 5mg Tab Take 1 Tab by mouth 2 Times a Day. 180 Tab 0   • tamsulosin (FLOMAX) 0.4 MG capsule Take 2 Caps by mouth every day. 60 Cap 6   • naproxen (NAPROSYN) 250 MG Tab Take 500-750 mg by mouth 2 times a day as needed. Indications: Pain     • aspirin EC (ECOTRIN) 81 MG Tablet Delayed Response Take 1 Tab by mouth every day. 30 Tab    • therapeutic multivitamin-minerals (THERAGRAN-M) Tab Take 1 Tab by mouth every day.     • [DISCONTINUED] lisinopril (PRINIVIL) 2.5 MG Tab Take 1 Tab by mouth every day. 30 Tab 3   • mirtazapine (REMERON) 15 MG Tab TAKE 1 TABLET BY MOUTH EVERYDAY AT BEDTIME 30 Tab 3   • [DISCONTINUED] hydrophilic ointment (AQUAPHOR) Ointment Apply 1 Each to affected area(s) 2 Times a Day.     • [DISCONTINUED] hydrOXYzine HCl (ATARAX) 25 MG Tab Take 1-2 Tabs " by mouth 3 times a day as needed for Anxiety (sleep). (Patient not taking: Reported on 12/11/2020) 30 Tab 0   • [DISCONTINUED] ondansetron (ZOFRAN) 8 MG Tab Take 1 Tab by mouth every 8 hours as needed for Nausea/Vomiting. (Patient not taking: Reported on 12/11/2020) 30 Tab 0   • azaTHIOprine (IMURAN) 50 MG Tab Take 50 mg by mouth 2 Times a Day. TAKE 1 TABLET BY MOUTH TWICE A DAY       No facility-administered encounter medications on file as of 12/11/2020.      Review of Systems   Constitutional: Positive for malaise/fatigue. Negative for weight loss.   Respiratory: Negative for shortness of breath.    Cardiovascular: Negative for chest pain, palpitations, orthopnea, claudication, leg swelling and PND.   Neurological: Negative for dizziness and weakness.   All other systems reviewed and are negative.       Objective:   /78 (BP Location: Left arm, Patient Position: Sitting, BP Cuff Size: Adult)   Pulse 87   Resp 16   Ht 1.829 m (6')   Wt 87.6 kg (193 lb 3.2 oz)   SpO2 100%   BMI 26.20 kg/m²     Physical Exam   Constitutional: He is oriented to person, place, and time. He appears well-developed and well-nourished. No distress.   HENT:   Head: Normocephalic.   Eyes: EOM are normal.   Neck: No JVD present.   Cardiovascular: Normal rate and regular rhythm.   Pulmonary/Chest: Effort normal and breath sounds normal.   Abdominal: Soft. There is no abdominal tenderness.   Musculoskeletal:         General: No edema.   Neurological: He is alert and oriented to person, place, and time.   Skin: Skin is warm and dry.   Psychiatric: He has a normal mood and affect. His behavior is normal.        Holter 10-9-19  Normal sinus rhythm   Occasional premature ventricular contractions (approximately 3.4% of total   beats)   No sustained arrhythmias noted      Echocardiogram:  8-  CONCLUSIONS  Prior echo 7/1/20, no significant changes are noted.  Left ventricular ejection fraction is visually estimated to be  30%.  Unable to estimate pulmonary artery pressure due to an inadequate tricuspid regurgitant jet.     Cardiac Catheterization:   DATE OF SERVICE:  03/11/2017     PROCEDURE:  Cardiac catheterization and Percutaneous Coronary Intervention.  A.  Left heart catheterization.  B.  Left ventriculography.  C.  Selective coronary angiography.  D.  Coronary aspiration thrombectomy of the left anterior descending artery.  E.  Coronary stent implantation of the proximal left anterior descending artery with a 4.0x8 mm drug-eluting Alpine Xience stent for in-stent restenosis.  F.  Coronary stent implantation of the mid left anterior descending artery with a 2.5x38 mm drug-eluting Xience Alpine stent post-dilated to 3.0 mm.  G.  Right radial artery approach.     PREOPERATIVE DIAGNOSES:  1.  ST elevation myocardial infarction, anterior.  2.  Coronary artery disease with previous myocardial infarction and coronary   intervention in 2008 and 2010.  3.  Paroxysmal atrial fibrillation.  4.  Previous stroke, December 2016.  5.  Hyperlipidemia.  6.  Polycythemia rubra vera with thrombocytopenia.  7.  Diabetes mellitus.     POSTPROCEDURE DIAGNOSES:  1.  Subacute stent thrombosis.  2.  In-stent restenosis of the proximal left anterior descending artery stent.  3.  Severe diffuse obstructive coronary artery disease of the mid left anterior descending artery.     Stress Test: 1/6/2018  NUCLEAR IMAGING INTERPRETATION  Severe left ventriular dilation at rest.  No additional dilation noted with stress.  Chronic left bundle branch block at rest.  Extensive, large prior inferior infarct.  No evidence of ischemia.  Global hypokinesis with resting LVEF 20-30%.  ECG INTERPRETATION  Nondiagnostic.    Echocardiogram 12/3/2020:     CONCLUSIONS  Limited for evaluation of pericardial effusion.  Severely reduced left ventricular systolic function, EF estimated to be   20%.    Global hypokinesis with akinesis of the mid to apical LAD  "distribution.    Trivial anterior pericardial effusion without evidence of hemodynamic   compromise.     Compared to the images of the prior study done on 10-, effusion is smaller, EF appears further reduced.     Medical Decision Making:  Today's Assessment / Status / Plan:     Ischemic CM (EF 30%), NYHA Classs I-II:  -Remains well compensated on exam, no evidence of heart failure.   -Patient is not interested in ICD or life vest for primary prevention at this time, would like to try 90 days of GDMT first.   -Stop Midodrine.   -Continue Lisinopril 2.5 mg daily.  -Would like to re-try low dose BB, will start 12.5 mg of Toprol in 4-5 days after stopping his midodrine if BP remains >110/50. Consider Coreg if unable to tolerate it.   -If BP remains >110/50 in 10 days after starting BB, increase lisinopril to 5 mg daily.   -Chem panel in 3 weeks.     CAD:  -S/P STEMI with multiple PCI (LAD, Lcs and POBA to the digonal) in setting of severe polycythemia.   -Denies Chest pain.  -Continue ASA 81 mg daily.     Carotid Artery Disease:  -S/P Right CEA in 2018.    HLD:  -LDL in 2019 was 94.  -Intolerant to statins, believes he has tried Zetia in the past.  -Open to discuss other options once he is one GDMT.     Pericardial Effusion:  -\"Trivial\" in size per repeat TTE on 12/3/2020.     PAF:  -Well controlled.   -OAC with Eliquis 5 mg BID.     Hypotension:  -Resolved.      Patient will follow up with me as scheduled below or earlier if needed. Encouraged patient to contact our office should any questions or concerns arise in the mean time.       Future Appointments   Date Time Provider Department Center   1/4/2021  8:50 AM JANES [facility]   1/14/2021 10:00 AM ALVINO Cano [facility] None       "

## 2020-12-11 NOTE — PATIENT INSTRUCTIONS
Stop midodrine, if after 5 days your BP is above 110/50 start taking the Metoprolol 12.5 mg every night. If after 10 days your BP remains above 110/50 double your lisinopril to 5 mg every night.     Send in BP log in about 3 weeks through my chart.

## 2020-12-28 ENCOUNTER — TELEPHONE (OUTPATIENT)
Dept: ENDOCRINOLOGY | Facility: MEDICAL CENTER | Age: 70
End: 2020-12-28

## 2020-12-28 DIAGNOSIS — E11.8 TYPE 2 DIABETES MELLITUS WITH COMPLICATION, WITHOUT LONG-TERM CURRENT USE OF INSULIN (HCC): ICD-10-CM

## 2020-12-28 DIAGNOSIS — E78.5 DYSLIPIDEMIA: ICD-10-CM

## 2020-12-28 NOTE — TELEPHONE ENCOUNTER
Called to confirm Virtual visit. Patient hung up on fist call. Iw as able to leave a vm on the second call. He has not done labs in a while so I included that we will contact if labs are needed for this f/v. Appt has not been confirmed.

## 2021-01-01 ENCOUNTER — APPOINTMENT (OUTPATIENT)
Dept: RADIOLOGY | Facility: MEDICAL CENTER | Age: 71
DRG: 194 | End: 2021-01-01
Attending: EMERGENCY MEDICINE
Payer: MEDICARE

## 2021-01-01 ENCOUNTER — PATIENT MESSAGE (OUTPATIENT)
Dept: CARDIOLOGY | Facility: MEDICAL CENTER | Age: 71
End: 2021-01-01

## 2021-01-01 ENCOUNTER — HOSPITAL ENCOUNTER (INPATIENT)
Facility: MEDICAL CENTER | Age: 71
LOS: 1 days | DRG: 194 | End: 2021-01-03
Attending: EMERGENCY MEDICINE | Admitting: INTERNAL MEDICINE
Payer: MEDICARE

## 2021-01-01 DIAGNOSIS — R07.1 CHEST PAIN ON BREATHING: ICD-10-CM

## 2021-01-01 PROBLEM — R06.02 SHORTNESS OF BREATH: Status: ACTIVE | Noted: 2019-01-05

## 2021-01-01 LAB
ALBUMIN SERPL BCP-MCNC: 3.6 G/DL (ref 3.2–4.9)
ALBUMIN/GLOB SERPL: 0.8 G/DL
ALP SERPL-CCNC: 79 U/L (ref 30–99)
ALT SERPL-CCNC: 12 U/L (ref 2–50)
AMPHET UR QL SCN: NEGATIVE
ANION GAP SERPL CALC-SCNC: 10 MMOL/L (ref 7–16)
AST SERPL-CCNC: 19 U/L (ref 12–45)
BARBITURATES UR QL SCN: NEGATIVE
BASE EXCESS BLDV CALC-SCNC: -2 MMOL/L
BASOPHILS # BLD AUTO: 1.2 % (ref 0–1.8)
BASOPHILS # BLD: 0.08 K/UL (ref 0–0.12)
BENZODIAZ UR QL SCN: NEGATIVE
BILIRUB SERPL-MCNC: 0.4 MG/DL (ref 0.1–1.5)
BODY TEMPERATURE: ABNORMAL CENTIGRADE
BUN SERPL-MCNC: 19 MG/DL (ref 8–22)
BZE UR QL SCN: NEGATIVE
CALCIUM SERPL-MCNC: 9.1 MG/DL (ref 8.4–10.2)
CANNABINOIDS UR QL SCN: NEGATIVE
CHLORIDE SERPL-SCNC: 104 MMOL/L (ref 96–112)
CK SERPL-CCNC: 44 U/L (ref 0–154)
CO2 SERPL-SCNC: 23 MMOL/L (ref 20–33)
COVID ORDER STATUS COVID19: NORMAL
CREAT SERPL-MCNC: 0.98 MG/DL (ref 0.5–1.4)
CRP SERPL HS-MCNC: 2.1 MG/DL (ref 0–0.75)
D DIMER PPP IA.FEU-MCNC: 0.86 UG/ML (FEU) (ref 0–0.5)
EKG IMPRESSION: NORMAL
EOSINOPHIL # BLD AUTO: 0.08 K/UL (ref 0–0.51)
EOSINOPHIL NFR BLD: 1.2 % (ref 0–6.9)
ERYTHROCYTE [DISTWIDTH] IN BLOOD BY AUTOMATED COUNT: 55.3 FL (ref 35.9–50)
ERYTHROCYTE [SEDIMENTATION RATE] IN BLOOD BY WESTERGREN METHOD: 89 MM/HOUR (ref 0–20)
FLUAV RNA SPEC QL NAA+PROBE: NEGATIVE
FLUBV RNA SPEC QL NAA+PROBE: NEGATIVE
GLOBULIN SER CALC-MCNC: 4.6 G/DL (ref 1.9–3.5)
GLUCOSE SERPL-MCNC: 172 MG/DL (ref 65–99)
HCO3 BLDV-SCNC: 22 MMOL/L (ref 24–28)
HCT VFR BLD AUTO: 38 % (ref 42–52)
HGB BLD-MCNC: 11.7 G/DL (ref 14–18)
IMM GRANULOCYTES # BLD AUTO: 0.06 K/UL (ref 0–0.11)
IMM GRANULOCYTES NFR BLD AUTO: 0.9 % (ref 0–0.9)
LYMPHOCYTES # BLD AUTO: 0.4 K/UL (ref 1–4.8)
LYMPHOCYTES NFR BLD: 6 % (ref 22–41)
MCH RBC QN AUTO: 27.4 PG (ref 27–33)
MCHC RBC AUTO-ENTMCNC: 30.8 G/DL (ref 33.7–35.3)
MCV RBC AUTO: 89 FL (ref 81.4–97.8)
METHADONE UR QL SCN: NEGATIVE
MONOCYTES # BLD AUTO: 0.35 K/UL (ref 0–0.85)
MONOCYTES NFR BLD AUTO: 5.2 % (ref 0–13.4)
NEUTROPHILS # BLD AUTO: 5.74 K/UL (ref 1.82–7.42)
NEUTROPHILS NFR BLD: 85.5 % (ref 44–72)
NRBC # BLD AUTO: 0 K/UL
NRBC BLD-RTO: 0 /100 WBC
NT-PROBNP SERPL IA-MCNC: 4449 PG/ML (ref 0–125)
OPIATES UR QL SCN: NEGATIVE
OXYCODONE UR QL SCN: NEGATIVE
PCO2 BLDV: 34 MMHG (ref 41–51)
PCP UR QL SCN: NEGATIVE
PH BLDV: 7.43 [PH] (ref 7.31–7.45)
PLATELET # BLD AUTO: 358 K/UL (ref 164–446)
PMV BLD AUTO: 11.7 FL (ref 9–12.9)
PO2 BLDV: 25.7 MMHG (ref 25–40)
POTASSIUM SERPL-SCNC: 4 MMOL/L (ref 3.6–5.5)
PROCALCITONIN SERPL-MCNC: 0.04 NG/ML
PROPOXYPH UR QL SCN: NEGATIVE
PROT SERPL-MCNC: 8.2 G/DL (ref 6–8.2)
RBC # BLD AUTO: 4.27 M/UL (ref 4.7–6.1)
RSV RNA SPEC QL NAA+PROBE: NEGATIVE
SAO2 % BLDV: 42.5 %
SARS-COV-2 RNA RESP QL NAA+PROBE: NOTDETECTED
SODIUM SERPL-SCNC: 137 MMOL/L (ref 135–145)
SPECIMEN SOURCE: NORMAL
TROPONIN T SERPL-MCNC: 19 NG/L (ref 6–19)
TROPONIN T SERPL-MCNC: 19 NG/L (ref 6–19)
WBC # BLD AUTO: 6.7 K/UL (ref 4.8–10.8)

## 2021-01-01 PROCEDURE — 82550 ASSAY OF CK (CPK): CPT

## 2021-01-01 PROCEDURE — 99218 PR INITIAL OBSERVATION CARE,LEVL I: CPT | Performed by: INTERNAL MEDICINE

## 2021-01-01 PROCEDURE — 80053 COMPREHEN METABOLIC PANEL: CPT

## 2021-01-01 PROCEDURE — 36415 COLL VENOUS BLD VENIPUNCTURE: CPT

## 2021-01-01 PROCEDURE — 84145 PROCALCITONIN (PCT): CPT

## 2021-01-01 PROCEDURE — 96375 TX/PRO/DX INJ NEW DRUG ADDON: CPT

## 2021-01-01 PROCEDURE — A9270 NON-COVERED ITEM OR SERVICE: HCPCS | Performed by: EMERGENCY MEDICINE

## 2021-01-01 PROCEDURE — 82962 GLUCOSE BLOOD TEST: CPT

## 2021-01-01 PROCEDURE — 82803 BLOOD GASES ANY COMBINATION: CPT

## 2021-01-01 PROCEDURE — 85652 RBC SED RATE AUTOMATED: CPT

## 2021-01-01 PROCEDURE — G0378 HOSPITAL OBSERVATION PER HR: HCPCS

## 2021-01-01 PROCEDURE — C9803 HOPD COVID-19 SPEC COLLECT: HCPCS | Performed by: EMERGENCY MEDICINE

## 2021-01-01 PROCEDURE — 84484 ASSAY OF TROPONIN QUANT: CPT

## 2021-01-01 PROCEDURE — A9270 NON-COVERED ITEM OR SERVICE: HCPCS | Performed by: INTERNAL MEDICINE

## 2021-01-01 PROCEDURE — 0241U HCHG SARS-COV-2 COVID-19 NFCT DS RESP RNA 4 TRGT MIC: CPT

## 2021-01-01 PROCEDURE — 80307 DRUG TEST PRSMV CHEM ANLYZR: CPT

## 2021-01-01 PROCEDURE — 700102 HCHG RX REV CODE 250 W/ 637 OVERRIDE(OP): Performed by: INTERNAL MEDICINE

## 2021-01-01 PROCEDURE — 700102 HCHG RX REV CODE 250 W/ 637 OVERRIDE(OP): Performed by: EMERGENCY MEDICINE

## 2021-01-01 PROCEDURE — 83880 ASSAY OF NATRIURETIC PEPTIDE: CPT

## 2021-01-01 PROCEDURE — 86140 C-REACTIVE PROTEIN: CPT

## 2021-01-01 PROCEDURE — 93005 ELECTROCARDIOGRAM TRACING: CPT | Performed by: EMERGENCY MEDICINE

## 2021-01-01 PROCEDURE — 71045 X-RAY EXAM CHEST 1 VIEW: CPT

## 2021-01-01 PROCEDURE — 99285 EMERGENCY DEPT VISIT HI MDM: CPT

## 2021-01-01 PROCEDURE — 85025 COMPLETE CBC W/AUTO DIFF WBC: CPT

## 2021-01-01 PROCEDURE — 700111 HCHG RX REV CODE 636 W/ 250 OVERRIDE (IP): Performed by: INTERNAL MEDICINE

## 2021-01-01 PROCEDURE — 71275 CT ANGIOGRAPHY CHEST: CPT

## 2021-01-01 PROCEDURE — 700117 HCHG RX CONTRAST REV CODE 255: Performed by: EMERGENCY MEDICINE

## 2021-01-01 PROCEDURE — 96372 THER/PROPH/DIAG INJ SC/IM: CPT

## 2021-01-01 PROCEDURE — 85379 FIBRIN DEGRADATION QUANT: CPT

## 2021-01-01 RX ORDER — METOPROLOL SUCCINATE 25 MG/1
12.5 TABLET, EXTENDED RELEASE ORAL EVERY EVENING
Status: DISCONTINUED | OUTPATIENT
Start: 2021-01-01 | End: 2021-01-02

## 2021-01-01 RX ORDER — ACETAMINOPHEN 325 MG/1
650 TABLET ORAL EVERY 6 HOURS PRN
Status: DISCONTINUED | OUTPATIENT
Start: 2021-01-01 | End: 2021-01-03 | Stop reason: HOSPADM

## 2021-01-01 RX ORDER — ASPIRIN 81 MG/1
324 TABLET, CHEWABLE ORAL DAILY
Status: DISCONTINUED | OUTPATIENT
Start: 2021-01-02 | End: 2021-01-03 | Stop reason: HOSPADM

## 2021-01-01 RX ORDER — ASPIRIN 325 MG
325 TABLET ORAL ONCE
Status: COMPLETED | OUTPATIENT
Start: 2021-01-01 | End: 2021-01-01

## 2021-01-01 RX ORDER — AMOXICILLIN 250 MG
2 CAPSULE ORAL 2 TIMES DAILY
Status: DISCONTINUED | OUTPATIENT
Start: 2021-01-01 | End: 2021-01-03 | Stop reason: HOSPADM

## 2021-01-01 RX ORDER — TAMSULOSIN HYDROCHLORIDE 0.4 MG/1
0.4 CAPSULE ORAL EVERY EVENING
Status: DISCONTINUED | OUTPATIENT
Start: 2021-01-01 | End: 2021-01-03 | Stop reason: HOSPADM

## 2021-01-01 RX ORDER — ONDANSETRON 2 MG/ML
4 INJECTION INTRAMUSCULAR; INTRAVENOUS EVERY 4 HOURS PRN
Status: DISCONTINUED | OUTPATIENT
Start: 2021-01-01 | End: 2021-01-03 | Stop reason: HOSPADM

## 2021-01-01 RX ORDER — ASPIRIN 600 MG/1
300 SUPPOSITORY RECTAL DAILY
Status: DISCONTINUED | OUTPATIENT
Start: 2021-01-02 | End: 2021-01-03 | Stop reason: HOSPADM

## 2021-01-01 RX ORDER — FUROSEMIDE 10 MG/ML
40 INJECTION INTRAMUSCULAR; INTRAVENOUS
Status: DISCONTINUED | OUTPATIENT
Start: 2021-01-01 | End: 2021-01-03 | Stop reason: HOSPADM

## 2021-01-01 RX ORDER — DEXTROSE MONOHYDRATE 25 G/50ML
50 INJECTION, SOLUTION INTRAVENOUS
Status: DISCONTINUED | OUTPATIENT
Start: 2021-01-01 | End: 2021-01-03 | Stop reason: HOSPADM

## 2021-01-01 RX ORDER — LISINOPRIL 5 MG/1
5 TABLET ORAL EVERY EVENING
Status: DISCONTINUED | OUTPATIENT
Start: 2021-01-01 | End: 2021-01-03 | Stop reason: HOSPADM

## 2021-01-01 RX ORDER — AZITHROMYCIN 250 MG/1
500 TABLET, FILM COATED ORAL DAILY
Status: DISCONTINUED | OUTPATIENT
Start: 2021-01-01 | End: 2021-01-03 | Stop reason: HOSPADM

## 2021-01-01 RX ORDER — BISACODYL 10 MG
10 SUPPOSITORY, RECTAL RECTAL
Status: DISCONTINUED | OUTPATIENT
Start: 2021-01-01 | End: 2021-01-03 | Stop reason: HOSPADM

## 2021-01-01 RX ORDER — ASPIRIN 325 MG
325 TABLET ORAL DAILY
Status: DISCONTINUED | OUTPATIENT
Start: 2021-01-02 | End: 2021-01-03 | Stop reason: HOSPADM

## 2021-01-01 RX ORDER — HYDROXYUREA 500 MG/1
500 CAPSULE ORAL 2 TIMES DAILY
Status: DISCONTINUED | OUTPATIENT
Start: 2021-01-01 | End: 2021-01-03 | Stop reason: HOSPADM

## 2021-01-01 RX ORDER — ONDANSETRON 4 MG/1
4 TABLET, ORALLY DISINTEGRATING ORAL EVERY 4 HOURS PRN
Status: DISCONTINUED | OUTPATIENT
Start: 2021-01-01 | End: 2021-01-03 | Stop reason: HOSPADM

## 2021-01-01 RX ORDER — INSULIN GLARGINE 100 [IU]/ML
10 INJECTION, SOLUTION SUBCUTANEOUS EVERY EVENING
Status: DISCONTINUED | OUTPATIENT
Start: 2021-01-01 | End: 2021-01-03 | Stop reason: HOSPADM

## 2021-01-01 RX ORDER — POLYETHYLENE GLYCOL 3350 17 G/17G
1 POWDER, FOR SOLUTION ORAL
Status: DISCONTINUED | OUTPATIENT
Start: 2021-01-01 | End: 2021-01-03 | Stop reason: HOSPADM

## 2021-01-01 RX ORDER — FINASTERIDE 5 MG/1
5 TABLET, FILM COATED ORAL DAILY
Status: DISCONTINUED | OUTPATIENT
Start: 2021-01-02 | End: 2021-01-03 | Stop reason: HOSPADM

## 2021-01-01 RX ADMIN — FUROSEMIDE 40 MG: 10 INJECTION, SOLUTION INTRAMUSCULAR; INTRAVENOUS at 18:53

## 2021-01-01 RX ADMIN — ASPIRIN 325 MG ORAL TABLET 325 MG: 325 PILL ORAL at 15:59

## 2021-01-01 RX ADMIN — LISINOPRIL 5 MG: 5 TABLET ORAL at 18:00

## 2021-01-01 RX ADMIN — INSULIN GLARGINE 10 UNITS: 100 INJECTION, SOLUTION SUBCUTANEOUS at 20:00

## 2021-01-01 RX ADMIN — IOHEXOL 85 ML: 350 INJECTION, SOLUTION INTRAVENOUS at 17:46

## 2021-01-01 RX ADMIN — METOPROLOL SUCCINATE 12.5 MG: 25 TABLET, EXTENDED RELEASE ORAL at 18:54

## 2021-01-01 RX ADMIN — INSULIN HUMAN 2 UNITS: 100 INJECTION, SOLUTION PARENTERAL at 20:00

## 2021-01-01 RX ADMIN — TAMSULOSIN HYDROCHLORIDE 0.4 MG: 0.4 CAPSULE ORAL at 18:54

## 2021-01-01 RX ADMIN — SENNOSIDES-DOCUSATE SODIUM TAB 8.6-50 MG 2 TABLET: 8.6-5 TAB at 18:53

## 2021-01-01 RX ADMIN — AZITHROMYCIN MONOHYDRATE 500 MG: 250 TABLET ORAL at 18:54

## 2021-01-01 RX ADMIN — APIXABAN 5 MG: 5 TABLET, FILM COATED ORAL at 18:54

## 2021-01-01 ASSESSMENT — ENCOUNTER SYMPTOMS
FOCAL WEAKNESS: 0
BLURRED VISION: 0
WEIGHT LOSS: 0
HEARTBURN: 0
SPUTUM PRODUCTION: 1
NECK PAIN: 0
PHOTOPHOBIA: 0
POLYDIPSIA: 0
ORTHOPNEA: 0
TREMORS: 0
NAUSEA: 0
SPEECH CHANGE: 0
BACK PAIN: 0
PALPITATIONS: 0
FLANK PAIN: 0
HALLUCINATIONS: 0
SHORTNESS OF BREATH: 1
FEVER: 0
BRUISES/BLEEDS EASILY: 0
HEADACHES: 0
VOMITING: 0
HEMOPTYSIS: 0
NERVOUS/ANXIOUS: 0
DOUBLE VISION: 0
CHILLS: 0
COUGH: 1

## 2021-01-01 ASSESSMENT — CHA2DS2 SCORE
PRIOR STROKE OR TIA OR THROMBOEMBOLISM: YES
HYPERTENSION: NO
AGE 75 OR GREATER: NO
CHA2DS2 VASC SCORE: 6
SEX: MALE
AGE 65 TO 74: YES
VASCULAR DISEASE: YES
CHF OR LEFT VENTRICULAR DYSFUNCTION: YES
DIABETES: YES

## 2021-01-01 ASSESSMENT — COGNITIVE AND FUNCTIONAL STATUS - GENERAL
DAILY ACTIVITIY SCORE: 24
SUGGESTED CMS G CODE MODIFIER MOBILITY: CH
SUGGESTED CMS G CODE MODIFIER DAILY ACTIVITY: CH
MOBILITY SCORE: 24

## 2021-01-01 ASSESSMENT — PAIN DESCRIPTION - DESCRIPTORS: DESCRIPTORS: ACHING

## 2021-01-01 ASSESSMENT — COPD QUESTIONNAIRES
DO YOU EVER COUGH UP ANY MUCUS OR PHLEGM?: NO/ONLY WITH OCCASIONAL COLDS OR INFECTIONS
DURING THE PAST 4 WEEKS HOW MUCH DID YOU FEEL SHORT OF BREATH: NONE/LITTLE OF THE TIME
HAVE YOU SMOKED AT LEAST 100 CIGARETTES IN YOUR ENTIRE LIFE: NO/DON'T KNOW
COPD SCREENING SCORE: 2

## 2021-01-01 ASSESSMENT — LIFESTYLE VARIABLES: SUBSTANCE_ABUSE: 0

## 2021-01-01 ASSESSMENT — FIBROSIS 4 INDEX: FIB4 SCORE: 1.03

## 2021-01-01 NOTE — ED TRIAGE NOTES
Presents complaining of acute onset of left chest pain with associated dyspnea at rest, recurring since yesterday.   His medical history notably includes ischemic cardiomyopathy, polycythemia vera and stroke.  Chief Complaint   Patient presents with   • Chest Pain   • Shortness of Breath       /100   Pulse 88   Temp 36.6 °C (97.9 °F) (Temporal)   Resp 18   Ht 1.829 m (6')   Wt 93 kg (205 lb 0.4 oz)   SpO2 96%   BMI 27.81 kg/m²

## 2021-01-02 ENCOUNTER — APPOINTMENT (OUTPATIENT)
Dept: CARDIOLOGY | Facility: MEDICAL CENTER | Age: 71
DRG: 194 | End: 2021-01-02
Attending: INTERNAL MEDICINE
Payer: MEDICARE

## 2021-01-02 PROBLEM — J18.9 COMMUNITY ACQUIRED PNEUMONIA: Status: ACTIVE | Noted: 2021-01-02

## 2021-01-02 LAB
ACTION RANGE TRIGGERED IACRT: NO
ALBUMIN SERPL BCP-MCNC: 3.2 G/DL (ref 3.2–4.9)
ALBUMIN/GLOB SERPL: 0.8 G/DL
ALP SERPL-CCNC: 64 U/L (ref 30–99)
ALT SERPL-CCNC: 9 U/L (ref 2–50)
ANION GAP SERPL CALC-SCNC: 11 MMOL/L (ref 7–16)
APTT PPP: 40.7 SEC (ref 24.7–36)
AST SERPL-CCNC: 14 U/L (ref 12–45)
BASE EXCESS BLDA CALC-SCNC: 1 MMOL/L (ref -4–3)
BASOPHILS # BLD AUTO: 0.8 % (ref 0–1.8)
BASOPHILS # BLD: 0.05 K/UL (ref 0–0.12)
BILIRUB SERPL-MCNC: 0.6 MG/DL (ref 0.1–1.5)
BODY TEMPERATURE: ABNORMAL DEGREES
BUN SERPL-MCNC: 15 MG/DL (ref 8–22)
CALCIUM SERPL-MCNC: 8.7 MG/DL (ref 8.4–10.2)
CFT BLD TEG: 5.1 MIN (ref 5–10)
CHLORIDE SERPL-SCNC: 104 MMOL/L (ref 96–112)
CLOT ANGLE BLD TEG: 72.3 DEGREES (ref 53–72)
CLOT LYSIS 30M P MA LENFR BLD TEG: 0.8 % (ref 0–8)
CO2 BLDA-SCNC: 23 MMOL/L (ref 20–33)
CO2 SERPL-SCNC: 23 MMOL/L (ref 20–33)
CREAT SERPL-MCNC: 0.83 MG/DL (ref 0.5–1.4)
CT.EXTRINSIC BLD ROTEM: 1.4 MIN (ref 1–3)
EKG IMPRESSION: NORMAL
EOSINOPHIL # BLD AUTO: 0.08 K/UL (ref 0–0.51)
EOSINOPHIL NFR BLD: 1.2 % (ref 0–6.9)
ERYTHROCYTE [DISTWIDTH] IN BLOOD BY AUTOMATED COUNT: 52.2 FL (ref 35.9–50)
GLOBULIN SER CALC-MCNC: 4.1 G/DL (ref 1.9–3.5)
GLUCOSE BLD-MCNC: 147 MG/DL (ref 65–99)
GLUCOSE BLD-MCNC: 171 MG/DL (ref 65–99)
GLUCOSE BLD-MCNC: 199 MG/DL (ref 65–99)
GLUCOSE BLD-MCNC: 220 MG/DL (ref 65–99)
GLUCOSE BLD-MCNC: 223 MG/DL (ref 65–99)
GLUCOSE SERPL-MCNC: 141 MG/DL (ref 65–99)
HCO3 BLDA-SCNC: 22.3 MMOL/L (ref 17–25)
HCT VFR BLD AUTO: 33.7 % (ref 42–52)
HGB BLD-MCNC: 10.5 G/DL (ref 14–18)
HOROWITZ INDEX BLDA+IHG-RTO: 4100 MM[HG]
IMM GRANULOCYTES # BLD AUTO: 0.05 K/UL (ref 0–0.11)
IMM GRANULOCYTES NFR BLD AUTO: 0.8 % (ref 0–0.9)
INR PPP: 1.59 (ref 0.87–1.13)
INST. QUALIFIED PATIENT IIQPT: YES
LV EJECT FRACT  99904: 20
LYMPHOCYTES # BLD AUTO: 0.4 K/UL (ref 1–4.8)
LYMPHOCYTES NFR BLD: 6.2 % (ref 22–41)
MCF BLD TEG: 77.1 MM (ref 50–70)
MCH RBC QN AUTO: 27.1 PG (ref 27–33)
MCHC RBC AUTO-ENTMCNC: 31.2 G/DL (ref 33.7–35.3)
MCV RBC AUTO: 87.1 FL (ref 81.4–97.8)
MONOCYTES # BLD AUTO: 0.5 K/UL (ref 0–0.85)
MONOCYTES NFR BLD AUTO: 7.7 % (ref 0–13.4)
NEUTROPHILS # BLD AUTO: 5.39 K/UL (ref 1.82–7.42)
NEUTROPHILS NFR BLD: 83.3 % (ref 44–72)
NRBC # BLD AUTO: 0 K/UL
NRBC BLD-RTO: 0 /100 WBC
O2/TOTAL GAS SETTING VFR VENT: 2 %
PCO2 BLDA: 26.2 MMHG (ref 26–37)
PH BLDA: 7.54 [PH] (ref 7.4–7.5)
PLATELET # BLD AUTO: 303 K/UL (ref 164–446)
PMV BLD AUTO: 12.2 FL (ref 9–12.9)
PO2 BLDA: 82 MMHG (ref 64–87)
POTASSIUM SERPL-SCNC: 3.3 MMOL/L (ref 3.6–5.5)
PROT SERPL-MCNC: 7.3 G/DL (ref 6–8.2)
PROTHROMBIN TIME: 18.6 SEC (ref 12–14.6)
RBC # BLD AUTO: 3.87 M/UL (ref 4.7–6.1)
SAO2 % BLDA: 97 % (ref 93–99)
SODIUM SERPL-SCNC: 138 MMOL/L (ref 135–145)
SPECIMEN DRAWN FROM PATIENT: ABNORMAL
TEG ALGORITHM TGALG: ABNORMAL
WBC # BLD AUTO: 6.5 K/UL (ref 4.8–10.8)

## 2021-01-02 PROCEDURE — 700111 HCHG RX REV CODE 636 W/ 250 OVERRIDE (IP): Performed by: HOSPITALIST

## 2021-01-02 PROCEDURE — 93005 ELECTROCARDIOGRAM TRACING: CPT | Performed by: HOSPITALIST

## 2021-01-02 PROCEDURE — 96375 TX/PRO/DX INJ NEW DRUG ADDON: CPT

## 2021-01-02 PROCEDURE — 82803 BLOOD GASES ANY COMBINATION: CPT

## 2021-01-02 PROCEDURE — 87040 BLOOD CULTURE FOR BACTERIA: CPT

## 2021-01-02 PROCEDURE — 700111 HCHG RX REV CODE 636 W/ 250 OVERRIDE (IP): Performed by: INTERNAL MEDICINE

## 2021-01-02 PROCEDURE — 93306 TTE W/DOPPLER COMPLETE: CPT

## 2021-01-02 PROCEDURE — A9270 NON-COVERED ITEM OR SERVICE: HCPCS | Performed by: HOSPITALIST

## 2021-01-02 PROCEDURE — 770020 HCHG ROOM/CARE - TELE (206)

## 2021-01-02 PROCEDURE — 700117 HCHG RX CONTRAST REV CODE 255: Performed by: INTERNAL MEDICINE

## 2021-01-02 PROCEDURE — 85576 BLOOD PLATELET AGGREGATION: CPT

## 2021-01-02 PROCEDURE — 96376 TX/PRO/DX INJ SAME DRUG ADON: CPT

## 2021-01-02 PROCEDURE — 700102 HCHG RX REV CODE 250 W/ 637 OVERRIDE(OP): Performed by: INTERNAL MEDICINE

## 2021-01-02 PROCEDURE — 82962 GLUCOSE BLOOD TEST: CPT | Mod: 91

## 2021-01-02 PROCEDURE — 85384 FIBRINOGEN ACTIVITY: CPT

## 2021-01-02 PROCEDURE — 96372 THER/PROPH/DIAG INJ SC/IM: CPT

## 2021-01-02 PROCEDURE — 700105 HCHG RX REV CODE 258: Performed by: HOSPITALIST

## 2021-01-02 PROCEDURE — 96365 THER/PROPH/DIAG IV INF INIT: CPT

## 2021-01-02 PROCEDURE — 85610 PROTHROMBIN TIME: CPT

## 2021-01-02 PROCEDURE — A9270 NON-COVERED ITEM OR SERVICE: HCPCS | Performed by: INTERNAL MEDICINE

## 2021-01-02 PROCEDURE — 85730 THROMBOPLASTIN TIME PARTIAL: CPT

## 2021-01-02 PROCEDURE — 99233 SBSQ HOSP IP/OBS HIGH 50: CPT | Performed by: HOSPITALIST

## 2021-01-02 PROCEDURE — 80053 COMPREHEN METABOLIC PANEL: CPT

## 2021-01-02 PROCEDURE — 85025 COMPLETE CBC W/AUTO DIFF WBC: CPT

## 2021-01-02 PROCEDURE — 85347 COAGULATION TIME ACTIVATED: CPT

## 2021-01-02 PROCEDURE — 700102 HCHG RX REV CODE 250 W/ 637 OVERRIDE(OP): Performed by: HOSPITALIST

## 2021-01-02 PROCEDURE — 93010 ELECTROCARDIOGRAM REPORT: CPT | Performed by: INTERNAL MEDICINE

## 2021-01-02 PROCEDURE — 36600 WITHDRAWAL OF ARTERIAL BLOOD: CPT

## 2021-01-02 PROCEDURE — 93306 TTE W/DOPPLER COMPLETE: CPT | Mod: 26 | Performed by: INTERNAL MEDICINE

## 2021-01-02 PROCEDURE — 99222 1ST HOSP IP/OBS MODERATE 55: CPT | Performed by: INTERNAL MEDICINE

## 2021-01-02 RX ORDER — OXYCODONE HYDROCHLORIDE AND ACETAMINOPHEN 5; 325 MG/1; MG/1
1 TABLET ORAL EVERY 6 HOURS PRN
Status: DISCONTINUED | OUTPATIENT
Start: 2021-01-02 | End: 2021-01-03 | Stop reason: HOSPADM

## 2021-01-02 RX ORDER — METOPROLOL SUCCINATE 25 MG/1
25 TABLET, EXTENDED RELEASE ORAL EVERY EVENING
Status: DISCONTINUED | OUTPATIENT
Start: 2021-01-02 | End: 2021-01-03

## 2021-01-02 RX ORDER — LORAZEPAM 2 MG/ML
.5-1 INJECTION INTRAMUSCULAR EVERY 4 HOURS PRN
Status: DISCONTINUED | OUTPATIENT
Start: 2021-01-02 | End: 2021-01-03 | Stop reason: HOSPADM

## 2021-01-02 RX ADMIN — OXYCODONE HYDROCHLORIDE AND ACETAMINOPHEN 1 TABLET: 5; 325 TABLET ORAL at 17:54

## 2021-01-02 RX ADMIN — METOPROLOL SUCCINATE 25 MG: 25 TABLET, EXTENDED RELEASE ORAL at 17:54

## 2021-01-02 RX ADMIN — APIXABAN 5 MG: 5 TABLET, FILM COATED ORAL at 06:05

## 2021-01-02 RX ADMIN — FUROSEMIDE 40 MG: 10 INJECTION, SOLUTION INTRAMUSCULAR; INTRAVENOUS at 15:25

## 2021-01-02 RX ADMIN — INSULIN GLARGINE 10 UNITS: 100 INJECTION, SOLUTION SUBCUTANEOUS at 17:51

## 2021-01-02 RX ADMIN — ASPIRIN 325 MG ORAL TABLET 325 MG: 325 PILL ORAL at 06:05

## 2021-01-02 RX ADMIN — LORAZEPAM 0.5 MG: 2 INJECTION INTRAMUSCULAR; INTRAVENOUS at 20:23

## 2021-01-02 RX ADMIN — LISINOPRIL 5 MG: 5 TABLET ORAL at 17:54

## 2021-01-02 RX ADMIN — HYDROXYUREA 500 MG: 500 CAPSULE ORAL at 17:56

## 2021-01-02 RX ADMIN — FINASTERIDE 5 MG: 5 TABLET, FILM COATED ORAL at 06:05

## 2021-01-02 RX ADMIN — LORAZEPAM 0.5 MG: 2 INJECTION INTRAMUSCULAR; INTRAVENOUS at 15:25

## 2021-01-02 RX ADMIN — OXYCODONE HYDROCHLORIDE AND ACETAMINOPHEN 1 TABLET: 5; 325 TABLET ORAL at 06:07

## 2021-01-02 RX ADMIN — INSULIN HUMAN 1 UNITS: 100 INJECTION, SOLUTION PARENTERAL at 11:08

## 2021-01-02 RX ADMIN — APIXABAN 5 MG: 5 TABLET, FILM COATED ORAL at 17:54

## 2021-01-02 RX ADMIN — HYDROXYUREA 500 MG: 500 CAPSULE ORAL at 06:05

## 2021-01-02 RX ADMIN — INSULIN HUMAN 1 UNITS: 100 INJECTION, SOLUTION PARENTERAL at 17:51

## 2021-01-02 RX ADMIN — TAMSULOSIN HYDROCHLORIDE 0.4 MG: 0.4 CAPSULE ORAL at 17:54

## 2021-01-02 RX ADMIN — SENNOSIDES-DOCUSATE SODIUM TAB 8.6-50 MG 2 TABLET: 8.6-5 TAB at 17:55

## 2021-01-02 RX ADMIN — CEFTRIAXONE SODIUM 2 G: 2 INJECTION, POWDER, FOR SOLUTION INTRAMUSCULAR; INTRAVENOUS at 13:39

## 2021-01-02 RX ADMIN — HUMAN ALBUMIN MICROSPHERES AND PERFLUTREN 3 ML: 10; .22 INJECTION, SOLUTION INTRAVENOUS at 15:16

## 2021-01-02 RX ADMIN — AZITHROMYCIN MONOHYDRATE 500 MG: 250 TABLET ORAL at 17:54

## 2021-01-02 RX ADMIN — FUROSEMIDE 40 MG: 10 INJECTION, SOLUTION INTRAMUSCULAR; INTRAVENOUS at 06:06

## 2021-01-02 RX ADMIN — INSULIN HUMAN 2 UNITS: 100 INJECTION, SOLUTION PARENTERAL at 20:22

## 2021-01-02 ASSESSMENT — ENCOUNTER SYMPTOMS
DOUBLE VISION: 0
VOMITING: 0
DIARRHEA: 0
MUSCULOSKELETAL NEGATIVE: 1
SHORTNESS OF BREATH: 1
NEUROLOGICAL NEGATIVE: 1
CONSTIPATION: 0
WHEEZING: 0
LOSS OF CONSCIOUSNESS: 0
HEMOPTYSIS: 0
BRUISES/BLEEDS EASILY: 0
NERVOUS/ANXIOUS: 0
NAUSEA: 0
DIZZINESS: 0
PALPITATIONS: 0
FEVER: 0
HEADACHES: 0
PSYCHIATRIC NEGATIVE: 1
EYES NEGATIVE: 1
GASTROINTESTINAL NEGATIVE: 1
COUGH: 1
FOCAL WEAKNESS: 0
SEIZURES: 0
DIAPHORESIS: 0
ABDOMINAL PAIN: 0
BLOOD IN STOOL: 0
CHILLS: 0
HEARTBURN: 0

## 2021-01-02 NOTE — H&P
"Hospital Medicine History & Physical Note    Date of Service  1/1/2021    Primary Care Physician  ALVINO Harman    Consultants  None    Code Status  Full Code    Chief Complaint  Chief Complaint   Patient presents with   • Chest Pain   • Shortness of Breath       History of Presenting Illness  70 y.o. male with past medical history of polycythemia vera, diabetes on insulin, CAD, STEMI, multiple stents placement in the past, ischemic cardiomyopathy, paroxysmal A. fib, stroke, chronic anticoagulation with apixaban, lupus, who presented 1/1/2021 with planes of shortness of breath, dizziness, chest pain, as well as cough with clear sputum.  She states that cough with clear sputum has been going on for a couple weeks.  He has been having hot flashes.  It is in the last 2 to 3 days he has been having intermittent episodes of shortness of breath without relation to exertion, associated with left-sided chest pain, perceived as a \"needle in the heart\", without alleviating  factors, but worsening with deep inspiration  Patient has been feeling intermittently dizzy, lightheaded . He  had syncopal episode yesterday after he started feeling dizzy,  he fell backward on the couch.  Unclear duration of the loss of consciousness.  Denies nausea, vomiting, diarrhea, fever, headache, focal weakness  In ER he has slightly elevated blood pressure 160/100, on room air despite dyspnea.  COVID-19 negative.  Chest x-ray showed ill-defined opacifications bilaterally, edema versus pneumonia.  EKG showed sinus rhythm, heart rate 81, incomplete left bundle branch block, no significant changes.  Troponin is not elevated.  D-dimer is elevated at 0.86, CTA of pulmonary artery pending  Review of Systems  Review of Systems   Constitutional: Positive for malaise/fatigue. Negative for chills, fever and weight loss.   HENT: Negative for ear pain, hearing loss and tinnitus.    Eyes: Negative for blurred vision, double vision and photophobia. "   Respiratory: Positive for cough, sputum production and shortness of breath. Negative for hemoptysis.    Cardiovascular: Positive for chest pain. Negative for palpitations and orthopnea.   Gastrointestinal: Negative for heartburn, nausea and vomiting.   Genitourinary: Negative for dysuria, flank pain, frequency and hematuria.   Musculoskeletal: Negative for back pain, joint pain and neck pain.   Skin: Negative for itching and rash.   Neurological: Negative for tremors, speech change, focal weakness and headaches.   Endo/Heme/Allergies: Negative for environmental allergies and polydipsia. Does not bruise/bleed easily.   Psychiatric/Behavioral: Negative for hallucinations and substance abuse. The patient is not nervous/anxious.        Past Medical History   has a past medical history of Anesthesia, Cancer ([facility]), Diabetes ([facility]), Family history of punctured lung (2002), Heart attack ([facility]) (03/2017), Hemorrhagic disorder ([facility]), High cholesterol, Ischemic cardiomyopathy, Kidney stones, Orthostatic hypotension (3/29/2018), Pain, Polycythemia vera(238.4), Stroke ([facility]) (2016), Urinary bladder disorder, and Vertigo.    Surgical History   has a past surgical history that includes other cardiac surgery; cath placement; laminotomy; appendectomy; carotid endarterectomy (Right, 3/21/2018); temporal artery biopsy (Right, 6/26/2018); pr incis/drain scrotum/testis,epididym (Right, 7/25/2020); pr explore scrotum (Right, 7/29/2020); pr explore scrotum (Right, 7/31/2020); skin abscess incision and drainage (Right, 8/3/2020); and split thickness skin graft (N/A, 8/23/2020).     Family History  family history is not on file.     Social History   reports that he has never smoked. He has never used smokeless tobacco. He reports that he does not drink alcohol or use drugs.    Allergies  Allergies   Allergen Reactions   • Doxycycline      Swelling lips and difficulties swallowing   • Beta Adrenergic Blockers Unspecified     dizziness   •  "Metformin Unspecified and Palpitations     Cardiac effects  \"Similar to a heart attack, I was hospitalized\"   • Simvastatin      Other reaction(s): Other (Specify with Comments)  Myalgias  Myalgias   • Statins [Hmg-Coa-R Inhibitors]      Muscle ache, joint pain       Medications  Prior to Admission Medications   Prescriptions Last Dose Informant Patient Reported? Taking?   Blood Glucose Test Strips NOT TAKING at NOT TAKING Patient No No   Sig: verio test strips for glucose monitoring TID and PRN   Patient not taking: Reported on 1/1/2021   Insulin Degludec (TRESIBA FLEXTOUCH) 200 UNIT/ML Solution Pen-injector 12/31/2020 at PM Patient No No   Sig: Inject 10 Units under the skin every evening.   Patient taking differently: Inject 8 Units under the skin every evening.   apixaban (ELIQUIS) 5mg Tab 1/1/2021 at AM Patient No No   Sig: Take 1 Tab by mouth 2 Times a Day.   aspirin EC (ECOTRIN) 81 MG Tablet Delayed Response 1/1/2021 at AM Patient Yes No   Sig: Take 1 Tab by mouth every day.   finasteride (PROSCAR) 5 MG Tab 1/1/2021 at AM Patient No No   Sig: Take 1 Tab by mouth every day.   hydroxyurea (HYDREA) 500 MG Cap 1/1/2021 at AM Patient Yes No   Sig: Take 500 mg by mouth 2 Times a Day.   lisinopril (PRINIVIL) 2.5 MG Tab 12/31/2020 at PM Patient No No   Sig: Take 2 Tabs by mouth every day.   Patient taking differently: Take 2.5 mg by mouth every day.   metoprolol SR (TOPROL XL) 25 MG TABLET SR 24 HR 12/31/2020 at PM Patient No No   Sig: Take 0.5 Tabs by mouth every evening.   mirtazapine (REMERON) 15 MG Tab NOT TAKING at NOT TAKING Patient No No   Sig: TAKE 1 TABLET BY MOUTH EVERYDAY AT BEDTIME   Patient not taking: Reported on 1/1/2021   naproxen (NAPROSYN) 250 MG Tab 1/1/2021 at AFTERNOON Patient Yes No   Sig: Take 500-750 mg by mouth 2 times a day as needed. Indications: Pain   tamsulosin (FLOMAX) 0.4 MG capsule 12/31/2020 at PM Patient No No   Sig: Take 2 Caps by mouth every day.   Patient taking differently: Take " 0.4 mg by mouth every day.   therapeutic multivitamin-minerals (THERAGRAN-M) Tab 1/1/2021 at AM Patient Yes No   Sig: Take 1 Tab by mouth every day.      Facility-Administered Medications: None       Physical Exam  Temp:  [36.6 °C (97.9 °F)] 36.6 °C (97.9 °F)  Pulse:  [73-88] 76  Resp:  [18-22] 21  BP: (143-159)/() 145/88  SpO2:  [94 %-96 %] 95 %    Physical Exam  Vitals signs and nursing note reviewed.   Constitutional:       General: He is not in acute distress.     Appearance: Normal appearance.   HENT:      Head: Normocephalic and atraumatic.      Nose: Nose normal.      Mouth/Throat:      Mouth: Mucous membranes are moist.   Eyes:      Extraocular Movements: Extraocular movements intact.      Pupils: Pupils are equal, round, and reactive to light.   Neck:      Musculoskeletal: Normal range of motion and neck supple.   Cardiovascular:      Rate and Rhythm: Normal rate and regular rhythm.   Pulmonary:      Effort: Pulmonary effort is normal. Tachypnea present.      Breath sounds: Normal breath sounds.      Comments: Dyspnea at rest  Chest:      Chest wall: Tenderness present.      Comments: Tenderness on the left side of the chest  Abdominal:      General: Abdomen is flat. There is no distension.      Tenderness: There is no abdominal tenderness.   Musculoskeletal: Normal range of motion.         General: No swelling or deformity.   Skin:     General: Skin is warm and dry.   Neurological:      General: No focal deficit present.      Mental Status: He is alert and oriented to person, place, and time.   Psychiatric:         Mood and Affect: Mood normal.         Behavior: Behavior normal.         Laboratory:  Recent Labs     01/01/21  1508   WBC 6.7   RBC 4.27*   HEMOGLOBIN 11.7*   HEMATOCRIT 38.0*   MCV 89.0   MCH 27.4   MCHC 30.8*   RDW 55.3*   PLATELETCT 358   MPV 11.7     Recent Labs     01/01/21  1508   SODIUM 137   POTASSIUM 4.0   CHLORIDE 104   CO2 23   GLUCOSE 172*   BUN 19   CREATININE 0.98   CALCIUM  9.1     Recent Labs     01/01/21  1508   ALTSGPT 12   ASTSGOT 19   ALKPHOSPHAT 79   TBILIRUBIN 0.4   GLUCOSE 172*         No results for input(s): NTPROBNP in the last 72 hours.      Recent Labs     01/01/21  1508   TROPONINT 19       Imaging:  DX-CHEST-PORTABLE (1 VIEW)   Final Result         Ill-defined opacifications in each lung have increased compared to the prior radiograph.  This could indicate worsening of pulmonary edema or inflammation or pneumonia.      CT-CTA CHEST PULMONARY ARTERY W/ RECONS    (Results Pending)         Assessment/Plan:  I anticipate this patient is appropriate for observation status at this time.    Pain in the chest  Assessment & Plan  Chest pain is atypical, not suggestive for MI.  Initial troponin and EKG is negative for acute ischemic changes.  Plan to admit to telemetry, repeat troponin and EKG  Follow with CTA of pulmonary artery and transthoracic echo results      Shortness of breath  Assessment & Plan  Etiology is not clear.  COVID-19 negative.  Chest x-ray showed bilateral interstitial opacifications infectious venous edema  Patient has ischemic cardiomyopathy with EF 20% most recently 12/3/2020  Considering elevated blood pressure, I suspect patient could be mildly hypervolemic . perhaps worsening of systolic dysfunction could cause worsening of dyspnea  I will start IV Lasix and order transthoracic echo  Monitor on telemetry, repeat troponin and EKG  CTA of pulmonary artery pending to rule out PE  We will start azithromycin for possible atypical pneumonia      Hypercoagulable state (HCC)- (present on admission)  Assessment & Plan  Continue with apixaban    Type 2 diabetes mellitus, with long-term current use of insulin (HCC)- (present on admission)  Assessment & Plan  Resume long-acting insulin  Insulin sliding scale    Benign prostatic hyperplasia without lower urinary tract symptoms- (present on admission)  Assessment & Plan  Continue outpatient medications

## 2021-01-02 NOTE — ASSESSMENT & PLAN NOTE
Patient has developed shortness of breath as well as some chest discomfort on the right side.  She came into the emergency room for evaluation.  Initially admitted because of chest pain.  Patient did undergo a CT of the thorax due to elevated D-dimer.  This is negative for pulmonary embolism but does demonstrate bilateral pneumonia.  Blood culture and sputum culture have been requested  Patient will be on IV Rocephin and azithromycin.

## 2021-01-02 NOTE — PROGRESS NOTES
This RN and JOSLYN Hodges were alerted by CNA that the patient was experiencing SOB. Vitals taken and stable 100% O2 on RA, HR 68. MD Jose notified on pt's status and orders were placed. Pt placed on 2L NC for comfort. RT Michelle is aware. Pt was in no acute distress when this RN left room.

## 2021-01-02 NOTE — ED PROVIDER NOTES
ED Provider Note    CHIEF COMPLAINT  Chief Complaint   Patient presents with   • Chest Pain   • Shortness of Breath       HPI  Francesco Schulte is a 70 y.o. male who presents complaining of chest pain shortness of breath.  The chest pain is located in the substernal region the chest.  Its a dull aching pain.  Is associated with shortness of breath and a nonproductive cough.  Patient states he feels like he cannot breathe.  The chest pain is worse when he coughs or breathes.  Patient has a history of coronary disease is concerned this is similar to when he was having a coronary stent placed.  Symptoms been getting progressively worse over the last 3 days.    REVIEW OF SYSTEMS  See HPI for further details.  No fever no chills.  Positive chest pain.  Positive cough.  Positive dyspnea.  All other systems are negative.    PAST MEDICAL HISTORY  Past Medical History:   Diagnosis Date   • Anesthesia     resistant to pain meds   • Cancer (HCC)     polycythemia vera   • Diabetes (HCC)     insulin and oral meds   • Family history of punctured lung 2002    left lung   • Heart attack (HCC) 03/2017   • Hemorrhagic disorder (HCC)     on blood thinners   • High cholesterol    • Ischemic cardiomyopathy    • Kidney stones     hx of kidney stones   • Orthostatic hypotension 3/29/2018   • Pain     headache pain   • Polycythemia vera(238.4)    • Stroke (HCC) 2016    r/t afib; eye issues resolved afterward   • Urinary bladder disorder     enlarged prostate, weak stream, difficulty urinating   • Vertigo        FAMILY HISTORY  History reviewed. No pertinent family history.    SOCIAL HISTORY  Social History     Socioeconomic History   • Marital status:      Spouse name: Not on file   • Number of children: Not on file   • Years of education: Not on file   • Highest education level: Not on file   Occupational History   • Not on file   Social Needs   • Financial resource strain: Not on file   • Food insecurity     Worry: Not on  file     Inability: Not on file   • Transportation needs     Medical: Not on file     Non-medical: Not on file   Tobacco Use   • Smoking status: Never Smoker   • Smokeless tobacco: Never Used   Substance and Sexual Activity   • Alcohol use: No   • Drug use: No   • Sexual activity: Not on file   Lifestyle   • Physical activity     Days per week: Not on file     Minutes per session: Not on file   • Stress: Not on file   Relationships   • Social connections     Talks on phone: Not on file     Gets together: Not on file     Attends [employer] service: Not on file     Active member of club or organization: Not on file     Attends meetings of clubs or organizations: Not on file     Relationship status: Not on file   • Intimate partner violence     Fear of current or ex partner: Not on file     Emotionally abused: Not on file     Physically abused: Not on file     Forced sexual activity: Not on file   Other Topics Concern   • Not on file   Social History Narrative   • Not on file       SURGICAL HISTORY  Past Surgical History:   Procedure Laterality Date   • SPLIT THICKNESS SKIN GRAFT N/A 8/23/2020    Procedure: APPLICATION, GRAFT, SKIN, SPLIT-THICKNESS;  Surgeon: Elisa Craig M.D.;  Location: [facility];  Service: Plastics   • SKIN ABSCESS INCISION AND DRAINAGE Right 8/3/2020    Procedure: INCISION AND DRAINAGE - SCROTAL WOUND - WOUND VAC PLACEMENT;  Surgeon: Jaylen Hayes M.D.;  Location: [facility];  Service: Urology   • PB EXPLORE SCROTUM Right 7/31/2020    Procedure: EXPLORATION, SCROTUM - FOR WASH OUT OF SCROTAL WOUND & WOUND VAC PLACEMENT;  Surgeon: Ferny Rosales M.D.;  Location: [facility];  Service: Urology   • PB EXPLORE SCROTUM Right 7/29/2020    Procedure: EXPLORATION, SCROTUM - FOR I & D OF SCROTAL AND  GROIN WOUND;  Surgeon: Donta Viera M.D.;  Location: [facility];  Service: Urology   • PB INCIS/DRAIN SCROTUM/TESTIS,EPIDIDYM Right  "7/25/2020    Procedure: INCISION AND DRAINAGE, SCROTUM;  Surgeon: Nick Negro M.D.;  Location: SURGERY [facility];  Service: Urology   • TEMPORAL ARTERY BIOPSY Right 6/26/2018    Procedure: TEMPORAL ARTERY BIOPSY;  Surgeon: Rajendra Aguilar M.D.;  Location: SURGERY SAME DAY [facility];  Service: General   • CAROTID ENDARTERECTOMY Right 3/21/2018    Procedure: CAROTID ENDARTERECTOMY- W/EEG MONITORING;  Surgeon: Benny Morfin M.D.;  Location: SURGERY [facility];  Service: General   • APPENDECTOMY     • CATH PLACEMENT      right arm   • LAMINOTOMY      diskectomy; C4   • OTHER CARDIAC SURGERY      stents x 3       CURRENT MEDICATIONS  Home Medications     Reviewed by Usman Garcia (Pharmacy Tech) on 01/01/21 at [facility]  Med List Status: Complete   Medication Last Dose Status   apixaban (ELIQUIS) 5mg Tab 1/1/2021 Active   aspirin EC (ECOTRIN) 81 MG Tablet Delayed Response 1/1/2021 Active   Blood Glucose Test Strips NOT TAKING Active   finasteride (PROSCAR) 5 MG Tab 1/1/2021 Active   hydroxyurea (HYDREA) 500 MG Cap 1/1/2021 Active   Insulin Degludec (TRESIBA FLEXTOUCH) 200 UNIT/ML Solution Pen-injector 12/31/2020 Active   lisinopril (PRINIVIL) 2.5 MG Tab 12/31/2020 Active   metoprolol SR (TOPROL XL) 25 MG TABLET SR 24 HR 12/31/2020 Active   mirtazapine (REMERON) 15 MG Tab NOT TAKING Active   naproxen (NAPROSYN) 250 MG Tab 1/1/2021 Active   tamsulosin (FLOMAX) 0.4 MG capsule 12/31/2020 Active   therapeutic multivitamin-minerals (THERAGRAN-M) Tab 1/1/2021 Active                ALLERGIES  Allergies   Allergen Reactions   • Doxycycline      Swelling lips and difficulties swallowing   • Beta Adrenergic Blockers Unspecified     dizziness   • Metformin Unspecified and Palpitations     Cardiac effects  \"Similar to a heart attack, I was hospitalized\"   • Simvastatin      Other reaction(s): Other (Specify with Comments)  Myalgias  Myalgias   • Statins [Hmg-Coa-R Inhibitors]      Muscle ache, joint pain "       PHYSICAL EXAM  VITAL SIGNS: /88   Pulse 73   Temp 36.6 °C (97.9 °F) (Temporal)   Resp (!) 22   Ht 1.829 m (6')   Wt 93 kg (205 lb 0.4 oz)   SpO2 94%   BMI 27.81 kg/m²   Constitutional: Well developed, Well nourished, mild acute distress  HENT: Normocephalic, Atraumatic, Bilateral external ears normal, Oropharynx moist  Eyes: BRENNAN, EOMI, Conjunctiva normal, No discharge.   Neck: Normal range of motion, No tenderness, Supple, No stridor. No nuchal rigidity  Lymphatic: No lymphadenopathy noted.   Cardiovascular: Heart regular rate and rhythm without murmur  Thorax & Lungs: Clear without wheezing  Abdomen: Bowel sounds normal, Soft, No tenderness, No masses, No pulsatile masses.   Skin: Warm, Dry, No erythema, No rash.   Back: No tenderness, No CVA tenderness.   Extremities: No tenderness, No cyanosis, No clubbing. Dorsalis pedis pulses 2+ equal bilaterally. Radial pulses 2+ equal bilaterally.  Trace pitting edema bilaterally  Neurologic: Alert & oriented x 3, Normal motor function, Normal sensory function, No focal deficits noted.     EKG  Sinus rhythm at a rate of 80.  There is LVH.  There is borderline intraventricular conduction delay.  There is a 4 line first-degree AV block.  Abnormal QRS.  Normal ST segments.  T waves are inverted in V6 1 and aVL.  There is no acute change.    RADIOLOGY/PROCEDURES  DX-CHEST-PORTABLE (1 VIEW)   Final Result         Ill-defined opacifications in each lung have increased compared to the prior radiograph.  This could indicate worsening of pulmonary edema or inflammation or pneumonia.      CT-CTA CHEST WITH & W/O-POST PROCESS    (Results Pending)     Results for orders placed or performed during the hospital encounter of 01/01/21   CBC with Differential   Result Value Ref Range    WBC 6.7 4.8 - 10.8 K/uL    RBC 4.27 (L) 4.70 - 6.10 M/uL    Hemoglobin 11.7 (L) 14.0 - 18.0 g/dL    Hematocrit 38.0 (L) 42.0 - 52.0 %    MCV 89.0 81.4 - 97.8 fL    MCH 27.4 27.0 - 33.0 pg     MCHC 30.8 (L) 33.7 - 35.3 g/dL    RDW 55.3 (H) 35.9 - 50.0 fL    Platelet Count 358 164 - 446 K/uL    MPV 11.7 9.0 - 12.9 fL    Neutrophils-Polys 85.50 (H) 44.00 - 72.00 %    Lymphocytes 6.00 (L) 22.00 - 41.00 %    Monocytes 5.20 0.00 - 13.40 %    Eosinophils 1.20 0.00 - 6.90 %    Basophils 1.20 0.00 - 1.80 %    Immature Granulocytes 0.90 0.00 - 0.90 %    Nucleated RBC 0.00 /100 WBC    Neutrophils (Absolute) 5.74 1.82 - 7.42 K/uL    Lymphs (Absolute) 0.40 (L) 1.00 - 4.80 K/uL    Monos (Absolute) 0.35 0.00 - 0.85 K/uL    Eos (Absolute) 0.08 0.00 - 0.51 K/uL    Baso (Absolute) 0.08 0.00 - 0.12 K/uL    Immature Granulocytes (abs) 0.06 0.00 - 0.11 K/uL    NRBC (Absolute) 0.00 K/uL   Complete Metabolic Panel (CMP)   Result Value Ref Range    Sodium 137 135 - 145 mmol/L    Potassium 4.0 3.6 - 5.5 mmol/L    Chloride 104 96 - 112 mmol/L    Co2 23 20 - 33 mmol/L    Anion Gap 10.0 7.0 - 16.0    Glucose 172 (H) 65 - 99 mg/dL    Bun 19 8 - 22 mg/dL    Creatinine 0.98 0.50 - 1.40 mg/dL    Calcium 9.1 8.4 - 10.2 mg/dL    AST(SGOT) 19 12 - 45 U/L    ALT(SGPT) 12 2 - 50 U/L    Alkaline Phosphatase 79 30 - 99 U/L    Total Bilirubin 0.4 0.1 - 1.5 mg/dL    Albumin 3.6 3.2 - 4.9 g/dL    Total Protein 8.2 6.0 - 8.2 g/dL    Globulin 4.6 (H) 1.9 - 3.5 g/dL    A-G Ratio 0.8 g/dL   Troponin   Result Value Ref Range    Troponin T 19 6 - 19 ng/L   COVID/SARS CoV-2 PCR    Specimen: Nasopharyngeal; Respirate   Result Value Ref Range    COVID Order Status Received    CoV-2, Flu A/B, And RSV by PCR   Result Value Ref Range    Influenza virus A RNA Negative Negative    Influenza virus B, PCR Negative Negative    RSV, PCR Negative Negative    SARS-CoV-2 by PCR NotDetected     SARS-CoV-2 Source NP Swab    ESTIMATED GFR   Result Value Ref Range    GFR If African American >60 >60 mL/min/1.73 m 2    GFR If Non African American >60 >60 mL/min/1.73 m 2   D-DIMER   Result Value Ref Range    D-Dimer Screen 0.86 (H) 0.00 - 0.50 ug/mL (FEU)   EKG   Result  Value Ref Range    Report       [facility]    Test Date:  2021  Pt Name:    MARZENA ROMEO             Department: [facility]  MRN:        [id number]                      Room:       -ROOM 5  Gender:     Male                         Technician: MICHAEL  :        1950                   Requested By:AYALA HAMPTON  Order #:    [id number]                    Reading MD: AYALA HAMPTON MD    Measurements  Intervals                                Axis  Rate:       81                           P:          39  NE:         176                          QRS:        -37  QRSD:       112                          T:          116  QT:         408  QTc:        474    Interpretive Statements  SINUS RHYTHM  INCOMPLETE LEFT BUNDLE BRANCH BLOCK  LVH WITH SECONDARY REPOLARIZATION ABNORMALITY  ANTERIOR Q WAVES, POSSIBLY DUE TO LVH  Compared to ECG 10/02/2020 14:11:39  Left ventricular hypertrophy now present  Early repolarization now present  Electronically Signed On 2021 16:27:29 PST by  AYALA HAMPTON MD           COURSE & MEDICAL DECISION MAKING  Pertinent Labs & Imaging studies reviewed. (See chart for details)  Patient has had severe crushing chest pain.  He also states he has had a mild nonproductive cough.  The differential diagnosis includes unstable angina versus pneumonia versus COVID-19.  I suspect pulmonary embolus is unlikely given his use of Eliquis.  [facility]  D-dimer was slightly elevated and again patient is on Eliquis I do not think he has a likely pulmonary embolus certainly when it would be clinically relevant however a CT scan will be obtained      FINAL IMPRESSION  1.  Chest pain  2.   3.      Please note that this dictation was created using voice recognition software. I have worked with consultants from the vendor as well as technical experts from [facility] to optimize the interface. I have made every reasonable  attempt to correct obvious errors, but I expect that there are errors of grammar and possibly content that I did not discover before finalizing the note.      Electronically signed by: Benny Anderson M.D., 1/1/2021 5:08 PM

## 2021-01-02 NOTE — ASSESSMENT & PLAN NOTE
Patient has atypical chest pain secondary to bilateral pneumonia.  Patient is negative on the COVID-19 test.  He is afebrile.  He has no diarrhea.  He does have an absolute lymphopenia.  Troponins have been slightly elevated but this is most likely secondary to the pneumonia.  EKG is reviewed and stable.  He will have an echocardiogram done before he discharges.

## 2021-01-02 NOTE — PROGRESS NOTES
Telemetry Shift Summary    Rhythm: SR W/ BBB  HR: 70-80  Ectopy: R-PVC, R-COUP, R-BIG    Measurements for strip printed 1/2/2021 at 0248  HR 70  0.18 / 0.12 / 0.44    Normal Values  Rhythm: SR  HR:   Measurements: 0.12-0.20 / 0.06-0.10 / 0.30-0.52

## 2021-01-02 NOTE — PROGRESS NOTES
Hospital Medicine Daily Progress Note    Date of Service  1/2/2021    Chief Complaint  70 y.o. male admitted 1/1/2021 with shortness of breath    Hospital Course  Patient is 70-year-old gentleman comes in with shortness of breath and chest pain.  The patient is found to have bilateral pneumonia.  Blood cultures and sputum cultures have been requested.  Patient has been placed on antibiotics with Rocephin azithromycin.  Continue with RT protocol and oxygen support.  COVID-19 is negative.  Await culture results  Echocardiogram because of her chest pain, troponins have been negative.    Interval Problem Update  Obtain blood culture and sputum culture.  Rocephin azithromycin day 2  Optimize pain management  Oxygen support  Echocardiogram pending  Cardiac monitoring      Consultants/Specialty  None    Code Status  Full Code    Disposition  Most likely home in the next couple days.    Review of Systems  Review of Systems   Constitutional: Positive for malaise/fatigue. Negative for chills, diaphoresis and fever.   HENT: Negative.    Eyes: Negative.  Negative for double vision.   Respiratory: Positive for cough and shortness of breath. Negative for hemoptysis and wheezing.    Cardiovascular: Positive for chest pain. Negative for palpitations and leg swelling.   Gastrointestinal: Negative.  Negative for abdominal pain, blood in stool, constipation, diarrhea, heartburn, nausea and vomiting.   Genitourinary: Negative.  Negative for frequency, hematuria and urgency.   Musculoskeletal: Negative.  Negative for joint pain.   Skin: Negative.  Negative for itching and rash.   Neurological: Negative.  Negative for dizziness, focal weakness, seizures, loss of consciousness and headaches.   Endo/Heme/Allergies: Negative.  Does not bruise/bleed easily.   Psychiatric/Behavioral: Negative.  Negative for suicidal ideas. The patient is not nervous/anxious.    All other systems reviewed and are negative.       Physical Exam  Temp:  [36.2 °C  (97.1 °F)-37.4 °C (99.3 °F)] 36.6 °C (97.8 °F)  Pulse:  [70-89] 70  Resp:  [18-22] 20  BP: (112-167)/() 117/67  SpO2:  [92 %-97 %] 95 %    Physical Exam  Vitals signs and nursing note reviewed. Exam conducted with a chaperone present.   Constitutional:       Appearance: Normal appearance. He is well-developed and normal weight.   HENT:      Head: Normocephalic and atraumatic.      Right Ear: External ear normal.      Left Ear: External ear normal.      Nose: Nose normal.      Mouth/Throat:      Mouth: Mucous membranes are moist.      Pharynx: Oropharynx is clear.   Eyes:      Extraocular Movements: Extraocular movements intact.      Conjunctiva/sclera: Conjunctivae normal.      Pupils: Pupils are equal, round, and reactive to light.   Neck:      Musculoskeletal: Normal range of motion and neck supple.      Thyroid: No thyromegaly.      Vascular: No JVD.   Cardiovascular:      Rate and Rhythm: Normal rate and regular rhythm.      Heart sounds: Murmur present.   Pulmonary:      Effort: Prolonged expiration present.      Breath sounds: Decreased air movement present. Examination of the right-lower field reveals decreased breath sounds and rales. Examination of the left-lower field reveals decreased breath sounds and rales. Decreased breath sounds and rales present.   Chest:      Chest wall: No tenderness.   Abdominal:      General: Abdomen is flat. Bowel sounds are normal. There is no distension.      Palpations: Abdomen is soft. There is no mass.      Tenderness: There is no abdominal tenderness. There is no guarding or rebound.   Musculoskeletal: Normal range of motion.      Right lower leg: No edema.      Left lower leg: No edema.   Lymphadenopathy:      Cervical: No cervical adenopathy.   Skin:     General: Skin is warm and dry.      Capillary Refill: Capillary refill takes more than 3 seconds.      Findings: No rash.   Neurological:      General: No focal deficit present.      Mental Status: He is alert and  oriented to person, place, and time. Mental status is at baseline.      Cranial Nerves: No cranial nerve deficit.      Deep Tendon Reflexes: Reflexes are normal and symmetric.   Psychiatric:         Mood and Affect: Mood normal.         Behavior: Behavior normal.         Thought Content: Thought content normal.         Judgment: Judgment normal.         Fluids    Intake/Output Summary (Last 24 hours) at 1/2/2021 1158  Last data filed at 1/2/2021 0652  Gross per 24 hour   Intake --   Output 750 ml   Net -750 ml       Laboratory  Recent Labs     01/01/21  1508 01/02/21  0356   WBC 6.7 6.5   RBC 4.27* 3.87*   HEMOGLOBIN 11.7* 10.5*   HEMATOCRIT 38.0* 33.7*   MCV 89.0 87.1   MCH 27.4 27.1   MCHC 30.8* 31.2*   RDW 55.3* 52.2*   PLATELETCT 358 303   MPV 11.7 12.2     Recent Labs     01/01/21  1508 01/02/21  0356   SODIUM 137 138   POTASSIUM 4.0 3.3*   CHLORIDE 104 104   CO2 23 23   GLUCOSE 172* 141*   BUN 19 15   CREATININE 0.98 0.83   CALCIUM 9.1 8.7     Recent Labs     01/02/21  1045   APTT 40.7*   INR 1.59*               Imaging  CT-CTA CHEST PULMONARY ARTERY W/ RECONS   Final Result         1. No pulmonary embolus.   2. Groundglass opacities in the right lung, suggestive of developing bronchopneumonia.   3. Left ventricular enlargement, bilateral pleural effusions and interlobular septal thickening in the lower lungs, suggestive of a component of cardiogenic interstitial edema.   4. Cholelithiasis.   5. A 1.6 cm cystic structure in the pancreatic tail, statistically benign. It can be definitively characterized by contrast enhanced abdominal MRI, pancreatic protocol, on an outpatient basis.         DX-CHEST-PORTABLE (1 VIEW)   Final Result         Ill-defined opacifications in each lung have increased compared to the prior radiograph.  This could indicate worsening of pulmonary edema or inflammation or pneumonia.      EC-ECHOCARDIOGRAM COMPLETE W/O CONT    (Results Pending)        Assessment/Plan  * Community acquired  pneumonia  Assessment & Plan  Patient has developed shortness of breath as well as some chest discomfort on the right side.  She came into the emergency room for evaluation.  Initially admitted because of chest pain.  Patient did undergo a CT of the thorax due to elevated D-dimer.  This is negative for pulmonary embolism but does demonstrate bilateral pneumonia.  Blood culture and sputum culture have been requested  Patient will be on IV Rocephin and azithromycin.    Pain in the chest  Assessment & Plan  Patient has atypical chest pain secondary to bilateral pneumonia.  Patient is negative on the COVID-19 test.  He is afebrile.  He has no diarrhea.  He does have an absolute lymphopenia.  Troponins have been slightly elevated but this is most likely secondary to the pneumonia.  EKG is reviewed and stable.  He will have an echocardiogram done before he discharges.      Shortness of breath  Assessment & Plan  Shortness of breath secondary to pneumonia  Covid is negative  Continue oxygen support and RT protocol      Hypercoagulable state (HCC)- (present on admission)  Assessment & Plan  Patient is chronically anticoagulated with apixaban.  Reason for anticoagulation is polycythemia vera.      Type 2 diabetes mellitus, with long-term current use of insulin (HCC)- (present on admission)  Assessment & Plan  -accus with sliding scale coverage  -diabetic diet  -diabetic education  -follow glycohemoglobin levels long term  -continue with oral antihyperglycemics  -monitor for hypoglycemic episodes and adjust control if he should get low    Benign prostatic hyperplasia without lower urinary tract symptoms- (present on admission)  Assessment & Plan  Patient remains on Flomax and Proscar.  Currently no urinary issues.         VTE prophylaxis: Apixaban

## 2021-01-02 NOTE — PROGRESS NOTES
Notified by nurse that the patient's heart rate is going up.  I quickly saw the patient at bedside.  The patient was in SVT for about 2 minutes.  The patient quickly converted back to sinus rhythm at the time of examination he was already in 70 on his heart rate and in sinus.  He did have some chest pressure with the SVT.  Echocardiogram is pending.  If this persists cardiology will be consulted.  In the meantime patient will be placed on rate control medication.

## 2021-01-02 NOTE — ASSESSMENT & PLAN NOTE
Shortness of breath secondary to pneumonia  Covid is negative  Continue oxygen support and RT protocol

## 2021-01-02 NOTE — ASSESSMENT & PLAN NOTE
Patient is chronically anticoagulated with apixaban.  Reason for anticoagulation is polycythemia vera.

## 2021-01-03 VITALS
OXYGEN SATURATION: 93 % | BODY MASS INDEX: 27.77 KG/M2 | HEART RATE: 58 BPM | HEIGHT: 72 IN | TEMPERATURE: 97.6 F | DIASTOLIC BLOOD PRESSURE: 72 MMHG | SYSTOLIC BLOOD PRESSURE: 123 MMHG | RESPIRATION RATE: 18 BRPM | WEIGHT: 205.03 LBS

## 2021-01-03 LAB — GLUCOSE BLD-MCNC: 117 MG/DL (ref 65–99)

## 2021-01-03 PROCEDURE — 99239 HOSP IP/OBS DSCHRG MGMT >30: CPT | Performed by: HOSPITALIST

## 2021-01-03 PROCEDURE — 700111 HCHG RX REV CODE 636 W/ 250 OVERRIDE (IP): Performed by: HOSPITALIST

## 2021-01-03 PROCEDURE — 700111 HCHG RX REV CODE 636 W/ 250 OVERRIDE (IP): Performed by: INTERNAL MEDICINE

## 2021-01-03 PROCEDURE — 700105 HCHG RX REV CODE 258: Performed by: HOSPITALIST

## 2021-01-03 PROCEDURE — 96376 TX/PRO/DX INJ SAME DRUG ADON: CPT

## 2021-01-03 PROCEDURE — 82962 GLUCOSE BLOOD TEST: CPT

## 2021-01-03 PROCEDURE — 700102 HCHG RX REV CODE 250 W/ 637 OVERRIDE(OP): Performed by: INTERNAL MEDICINE

## 2021-01-03 PROCEDURE — A9270 NON-COVERED ITEM OR SERVICE: HCPCS | Performed by: INTERNAL MEDICINE

## 2021-01-03 RX ORDER — DIGOXIN 125 MCG
125 TABLET ORAL DAILY
Qty: 30 TAB | Refills: 3 | Status: SHIPPED | OUTPATIENT
Start: 2021-01-03 | End: 2021-04-13 | Stop reason: SDUPTHER

## 2021-01-03 RX ORDER — FUROSEMIDE 40 MG/1
40 TABLET ORAL 2 TIMES DAILY
Qty: 60 TAB | Refills: 3 | Status: SHIPPED | OUTPATIENT
Start: 2021-01-03 | End: 2021-01-14 | Stop reason: SDUPTHER

## 2021-01-03 RX ORDER — AMOXICILLIN AND CLAVULANATE POTASSIUM 875; 125 MG/1; MG/1
1 TABLET, FILM COATED ORAL 2 TIMES DAILY
Qty: 10 TAB | Refills: 0 | Status: SHIPPED | OUTPATIENT
Start: 2021-01-03 | End: 2021-01-08

## 2021-01-03 RX ORDER — METOPROLOL SUCCINATE 25 MG/1
50 TABLET, EXTENDED RELEASE ORAL EVERY EVENING
Status: DISCONTINUED | OUTPATIENT
Start: 2021-01-03 | End: 2021-01-03 | Stop reason: HOSPADM

## 2021-01-03 RX ORDER — AZITHROMYCIN 250 MG/1
250 TABLET, FILM COATED ORAL DAILY
Qty: 3 TAB | Refills: 0 | Status: SHIPPED | OUTPATIENT
Start: 2021-01-03 | End: 2021-01-06

## 2021-01-03 RX ORDER — SPIRONOLACTONE 50 MG/1
50 TABLET, FILM COATED ORAL DAILY
Qty: 30 TAB | Refills: 3 | Status: SHIPPED | OUTPATIENT
Start: 2021-01-03 | End: 2021-01-14 | Stop reason: SDUPTHER

## 2021-01-03 RX ORDER — DIGOXIN 125 MCG
125 TABLET ORAL DAILY
Status: DISCONTINUED | OUTPATIENT
Start: 2021-01-03 | End: 2021-01-03 | Stop reason: HOSPADM

## 2021-01-03 RX ORDER — METOPROLOL SUCCINATE 50 MG/1
50 TABLET, EXTENDED RELEASE ORAL EVERY EVENING
Qty: 30 TAB | Refills: 3 | Status: SHIPPED | OUTPATIENT
Start: 2021-01-03 | End: 2021-02-05 | Stop reason: SDUPTHER

## 2021-01-03 RX ADMIN — FUROSEMIDE 40 MG: 10 INJECTION, SOLUTION INTRAMUSCULAR; INTRAVENOUS at 05:30

## 2021-01-03 RX ADMIN — FINASTERIDE 5 MG: 5 TABLET, FILM COATED ORAL at 05:29

## 2021-01-03 RX ADMIN — HYDROXYUREA 500 MG: 500 CAPSULE ORAL at 05:30

## 2021-01-03 RX ADMIN — APIXABAN 5 MG: 5 TABLET, FILM COATED ORAL at 05:29

## 2021-01-03 RX ADMIN — CEFTRIAXONE SODIUM 2 G: 2 INJECTION, POWDER, FOR SOLUTION INTRAMUSCULAR; INTRAVENOUS at 05:30

## 2021-01-03 RX ADMIN — ASPIRIN 325 MG ORAL TABLET 325 MG: 325 PILL ORAL at 05:31

## 2021-01-03 NOTE — PROGRESS NOTES
Telemetry Shift Summary    Rhythm: SR/SB w/ BBB  HR: 50-60  Ectopy: o-pvc, r-coup    Measurements for strip printed 1/3/2021 at 0213  HR 58  0.20 / 0.14 / 0.44    Normal Values  Rhythm: SR  HR:   Measurements: 0.12-0.20 / 0.06-0.10 / 0.30-0.52

## 2021-01-03 NOTE — CONSULTS
"Cardiology Initial Consult Note    DOS: 1/2/2021    Referring physician: Dr Jose    Chief complaint/Reason for consult: SVT    HPI: 71 y/o M with h/o CAD s/p MI, PCI, ICM EF 20%, HTN, DM, pAF on Eliquis, CVA, SLE, presented with cough, chest pain, and SOB. Found to have bilateral PNA. Started on IV antibiotics. Pt notes 2 days of these symptoms prior to admit. Symptoms improving. Pt noted to have multiple episodes of \"SVT\" today on telemetry, self-terminating. Pt notes symptoms of \"heart racing\" during these episodes, like he \"drank 100 cups of coffee\". He endorses palpitations in the past but notes this was more sustained today. No syncope or presyncope.    ROS (+ highlighted in bold):  Constitutional: Fevers/chills/fatigue/weightloss  HEENT: Blurry vision/eye pain/sore throat/hearing loss  Respiratory: Shortness of breath/cough  Cardiovascular: Chest pain/palpitations/edema/orthopnea/syncope  GI: Nausea/vomitting/diarrhea  MSK: Arthralgias/myagias/muscle weakness  Skin: Rash/sores  Neurological: Numbness/tremors/vertigo  Endocrine: Excessive thirst/polyuria/cold intolerance/heat intolerance  Psych: Depression/anxiety    Past Medical History:   Diagnosis Date   • Anesthesia     resistant to pain meds   • Cancer (HCC)     polycythemia vera   • Diabetes (HCC)     insulin and oral meds   • Family history of punctured lung 2002    left lung   • Heart attack (HCC) 03/2017   • Hemorrhagic disorder (HCC)     on blood thinners   • High cholesterol    • Ischemic cardiomyopathy    • Kidney stones     hx of kidney stones   • Orthostatic hypotension 3/29/2018   • Pain     headache pain   • Polycythemia vera(238.4)    • Stroke (HCC) 2016    r/t afib; eye issues resolved afterward   • Urinary bladder disorder     enlarged prostate, weak stream, difficulty urinating   • Vertigo        Past Surgical History:   Procedure Laterality Date   • SPLIT THICKNESS SKIN GRAFT N/A 8/23/2020    Procedure: APPLICATION, GRAFT, SKIN, " SPLIT-THICKNESS;  Surgeon: Elisa Craig M.D.;  Location: SURGERY [facility];  Service: Plastics   • SKIN ABSCESS INCISION AND DRAINAGE Right 8/3/2020    Procedure: INCISION AND DRAINAGE - SCROTAL WOUND - WOUND VAC PLACEMENT;  Surgeon: Jaylen Hayes M.D.;  Location: [facility];  Service: Urology   • PB EXPLORE SCROTUM Right 7/31/2020    Procedure: EXPLORATION, SCROTUM - FOR WASH OUT OF SCROTAL WOUND & WOUND VAC PLACEMENT;  Surgeon: Ferny Rosales M.D.;  Location: [facility];  Service: Urology   • PB EXPLORE SCROTUM Right 7/29/2020    Procedure: EXPLORATION, SCROTUM - FOR I & D OF SCROTAL AND  GROIN WOUND;  Surgeon: Donta Viera M.D.;  Location: [facility];  Service: Urology   • PB INCIS/DRAIN SCROTUM/TESTIS,EPIDIDYM Right 7/25/2020    Procedure: INCISION AND DRAINAGE, SCROTUM;  Surgeon: Nick Negro M.D.;  Location: [facility];  Service: Urology   • TEMPORAL ARTERY BIOPSY Right 6/26/2018    Procedure: TEMPORAL ARTERY BIOPSY;  Surgeon: Rajendra Aguilar M.D.;  Location: SURGERY SAME DAY [facility];  Service: General   • CAROTID ENDARTERECTOMY Right 3/21/2018    Procedure: CAROTID ENDARTERECTOMY- W/EEG MONITORING;  Surgeon: Benny Morfin M.D.;  Location: [facility];  Service: General   • APPENDECTOMY     • CATH PLACEMENT      right arm   • LAMINOTOMY      diskectomy; C4   • OTHER CARDIAC SURGERY      stents x 3       Social History     Socioeconomic History   • Marital status:      Spouse name: Not on file   • Number of children: Not on file   • Years of education: Not on file   • Highest education level: Not on file   Occupational History   • Not on file   Social Needs   • Financial resource strain: Not on file   • Food insecurity     Worry: Not on file     Inability: Not on file   • Transportation needs     Medical: Not on file     Non-medical: Not on file   Tobacco Use   • Smoking status: Never Smoker   •  "Smokeless tobacco: Never Used   Substance and Sexual Activity   • Alcohol use: No   • Drug use: No   • Sexual activity: Not on file   Lifestyle   • Physical activity     Days per week: Not on file     Minutes per session: Not on file   • Stress: Not on file   Relationships   • Social connections     Talks on phone: Not on file     Gets together: Not on file     Attends [employer] service: Not on file     Active member of club or organization: Not on file     Attends meetings of clubs or organizations: Not on file     Relationship status: Not on file   • Intimate partner violence     Fear of current or ex partner: Not on file     Emotionally abused: Not on file     Physically abused: Not on file     Forced sexual activity: Not on file   Other Topics Concern   • Not on file   Social History Narrative   • Not on file       History reviewed. No pertinent family history.   Denies family history of sudden cardiac death    Allergies   Allergen Reactions   • Doxycycline      Swelling lips and difficulties swallowing   • Beta Adrenergic Blockers Unspecified     dizziness   • Metformin Unspecified and Palpitations     Cardiac effects  \"Similar to a heart attack, I was hospitalized\"   • Simvastatin      Other reaction(s): Other (Specify with Comments)  Myalgias  Myalgias   • Statins [Hmg-Coa-R Inhibitors]      Muscle ache, joint pain       Current Facility-Administered Medications   Medication Dose Route Frequency Provider Last Rate Last Admin   • oxyCODONE-acetaminophen (PERCOCET) 5-325 MG per tablet 1 Tab  1 Tab Oral Q6HRS PRN Lanie Knight M.D.   1 Tab at 01/02/21 1754   • cefTRIAXone (ROCEPHIN) 2 g in  mL IVPB  2 g Intravenous Q24HRS Lenin Jose M.D.   Stopped at 01/02/21 1409   • metoprolol SR (TOPROL XL) tablet 25 mg  25 mg Oral Q EVENING Lenin Jose M.D.   25 mg at 01/02/21 1754   • LORazepam (ATIVAN) injection 0.5-1 mg  0.5-1 mg Intravenous Q4HRS PRN Lenin Jose M.D.   0.5 mg at 01/02/21 1525 "   • senna-docusate (PERICOLACE or SENOKOT S) 8.6-50 MG per tablet 2 Tab  2 Tab Oral BID Antwan Thibodeaux M.D.   2 Tab at 01/02/21 1755    And   • polyethylene glycol/lytes (MIRALAX) PACKET 1 Packet  1 Packet Oral QDAY PRN Antwan Thibodeaux M.D.        And   • bisacodyl (DULCOLAX) suppository 10 mg  10 mg Rectal QDAY PRN Antwan Thibodeaux M.D.       • Respiratory Therapy Consult   Nebulization Continuous RT Antwan Thibodeaux M.D.       • acetaminophen (Tylenol) tablet 650 mg  650 mg Oral Q6HRS PRN Antwan Thibodeaux M.D.       • ondansetron (ZOFRAN) syringe/vial injection 4 mg  4 mg Intravenous Q4HRS PRN Antwan Thibodeaux M.D.       • ondansetron (ZOFRAN ODT) dispertab 4 mg  4 mg Oral Q4HRS PRN Antwan Thibodeaux M.D.       • aspirin (ASA) tablet 325 mg  325 mg Oral DAILY Antwan Thibodeaux M.D.   325 mg at 01/02/21 0605    Or   • aspirin (ASA) chewable tab 324 mg  324 mg Oral DAILY Antwan Thibodeaux M.D.        Or   • aspirin (ASA) suppository 300 mg  300 mg Rectal DAILY Antwan Thibodeaux M.D.       • azithromycin (ZITHROMAX) tablet 500 mg  500 mg Oral DAILY Antwan Thibodeaux M.D.   500 mg at 01/02/21 1754   • finasteride (PROSCAR) tablet 5 mg  5 mg Oral DAILY Antwan Thibodeaux M.D.   5 mg at 01/02/21 0605   • hydroxyurea (HYDREA) capsule 500 mg  500 mg Oral BID Antwan Thibodeaux M.D.   500 mg at 01/02/21 1756   • insulin regular (HumuLIN R,NovoLIN R) injection  1-6 Units Subcutaneous 4X/DAY ACHKALIE Thibodeaux M.D.   1 Units at 01/02/21 1751    And   • glucose 4 g chewable tablet 16 g  16 g Oral Q15 MIN PRN Antwan Thibodeaux M.D.        And   • dextrose 50% (D50W) injection 50 mL  50 mL Intravenous Q15 MIN PRN Antwan Thibodeaux M.D.       • lisinopril (PRINIVIL) tablet 5 mg  5 mg Oral Q EVENING Antwan Thibodeaux M.D.   5 mg at 01/02/21 1754   • tamsulosin (FLOMAX) capsule 0.4 mg  0.4 mg Oral Q EVENING Antwan Thibodeaux M.D.   0.4 mg at 01/02/21 1754   • apixaban (ELIQUIS) tablet 5 mg  5 mg Oral BID Antwan  JANES Thibodeaux   5 mg at 01/02/21 1754   • furosemide (LASIX) injection 40 mg  40 mg Intravenous BID DIURETIC Antwan Thibodeaux M.D.   40 mg at 01/02/21 1525   • insulin glargine (Lantus) injection  10 Units Subcutaneous Q EVENING Antwan Thibodeaux M.D.   10 Units at 01/02/21 1751       Physical Exam:  Vitals:    01/02/21 1106 01/02/21 1504 01/02/21 1752 01/02/21 1900   BP: 117/67 127/66 131/83 111/67   Pulse: 70 66 69 70   Resp: 20 20 16 17   Temp: 36.6 °C (97.8 °F) 37.1 °C (98.7 °F)  37 °C (98.6 °F)   TempSrc: Temporal Temporal  Temporal   SpO2: 95% 100% 96% 91%   Weight:       Height:         General appearance: NAD, conversant   Eyes: anicteric sclerae, moist conjunctivae; no lid-lag; PERRLA  HENT: Atraumatic; oropharynx clear with moist mucous membranes and no mucosal ulcerations; normal hard and soft palate  Neck: Trachea midline; FROM, supple, no thyromegaly or lymphadenopathy  Lungs: CTA, with normal respiratory effort and no intercostal retractions  CV: RRR, no MRGs, no JVD   Abdomen: Soft, non-tender; no masses or HSM  Extremities: No peripheral edema or extremity lymphadenopathy  Skin: Normal temperature, turgor and texture; no rash, ulcers or subcutaneous nodules  Psych: Appropriate affect, alert and oriented to person, place and time    Data:  Lipids:   Lab Results   Component Value Date/Time    CHOLSTRLTOT 159 08/06/2019 08:03 AM    TRIGLYCERIDE 99 08/06/2019 08:03 AM    HDL 45 08/06/2019 08:03 AM    LDL 94 08/06/2019 08:03 AM        BMP:  Lab Results   Component Value Date/Time    SODIUM 138 01/02/2021 0356    POTASSIUM 3.3 (L) 01/02/2021 0356    CHLORIDE 104 01/02/2021 0356    CO2 23 01/02/2021 0356    GLUCOSE 141 (H) 01/02/2021 0356    BUN 15 01/02/2021 0356    CREATININE 0.83 01/02/2021 0356    CALCIUM 8.7 01/02/2021 0356    ANION 11.0 01/02/2021 0356        TSH:   Lab Results   Component Value Date/Time    TSHULTRASEN 0.459 10/02/2020 0420        THYROXINE (T4):   No results found for: SHALOM      CBC:   Lab Results   Component Value Date/Time    WBC 6.5 01/02/2021 03:56 AM    RBC 3.87 (L) 01/02/2021 03:56 AM    HEMOGLOBIN 10.5 (L) 01/02/2021 03:56 AM    HEMATOCRIT 33.7 (L) 01/02/2021 03:56 AM    MCV 87.1 01/02/2021 03:56 AM    MCH 27.1 01/02/2021 03:56 AM    MCHC 31.2 (L) 01/02/2021 03:56 AM    RDW 52.2 (H) 01/02/2021 03:56 AM    PLATELETCT 303 01/02/2021 03:56 AM    MPV 12.2 01/02/2021 03:56 AM    NEUTSPOLYS 83.30 (H) 01/02/2021 03:56 AM    LYMPHOCYTES 6.20 (L) 01/02/2021 03:56 AM    MONOCYTES 7.70 01/02/2021 03:56 AM    EOSINOPHILS 1.20 01/02/2021 03:56 AM    BASOPHILS 0.80 01/02/2021 03:56 AM    IMMGRAN 0.80 01/02/2021 03:56 AM    NRBC 0.00 01/02/2021 03:56 AM    NEUTS 5.39 01/02/2021 03:56 AM    LYMPHS 0.40 (L) 01/02/2021 03:56 AM    MONOS 0.50 01/02/2021 03:56 AM    EOS 0.08 01/02/2021 03:56 AM    BASO 0.05 01/02/2021 03:56 AM    IMMGRANAB 0.05 01/02/2021 03:56 AM    NRBCAB 0.00 01/02/2021 03:56 AM        CBC w/o DIFF  Lab Results   Component Value Date/Time    WBC 6.5 01/02/2021 03:56 AM    RBC 3.87 (L) 01/02/2021 03:56 AM    HEMOGLOBIN 10.5 (L) 01/02/2021 03:56 AM    MCV 87.1 01/02/2021 03:56 AM    MCH 27.1 01/02/2021 03:56 AM    MCHC 31.2 (L) 01/02/2021 03:56 AM    RDW 52.2 (H) 01/02/2021 03:56 AM    MPV 12.2 01/02/2021 03:56 AM       Prior echo/stress results reviewed: EF 20%    EKG interpreted by me: SR with LVH    Impression/Plan:  1. Chest pain on breathing       1. Pneumonia  2. pSVT on telemetry  3. pAF  4. Chronic oral anticoagulation  5. Chronic systolic heart failure  6. ICM EF 20%    - Unclear if SVT episodes represent SVT or AF with RVR, telemetry strips unavailable for my review  - Would increase Metoprolol XL to 50mg and start digoxin 125mcg daily for control of SVT/Afib  - Continue anticoagulation  - Continue guideline directed medical therapy for CHF  - Will need consideration for primary prevention ICD, can be deferred to outpatient discussions    Thank you for this consult,  cardiology will sign off      Alberto Isaac MD  Cardiac Electrophysiology

## 2021-01-03 NOTE — DISCHARGE SUMMARY
Discharge Summary    CHIEF COMPLAINT ON ADMISSION  Chief Complaint   Patient presents with   • Chest Pain   • Shortness of Breath       Reason for Admission  Chest pain,shortness of breath     Admission Date  1/1/2021    CODE STATUS  Full Code    HPI & HOSPITAL COURSE  This is a 70 y.o. male here with chest pain and shortness of breath.  The patient was initially evaluated in the emergency room at [facility] and was found to have COVID-19 negative results.  The patient was admitted to the medical telemetry unit.  The patient did have a diagnosis of a respiratory failure along with evaluation with a CAT scan showing bilateral pneumonia.  The pneumonia was treated with azithromycin Rocephin.  This has now been switched over to azithromycin and Augmentin.  The patient has had negative cultures.  The patient has been able to be weaned off oxygen completely.  Because he was having chest pain the patient underwent also a chest pain work-up negative troponins but will found to have a left ventricular ejection fraction of only 20% which is much lower than his previous.  The patient also had episodes of SVT.  The SVT resolved spontaneously.  I have asked cardiology to evaluate him.  At this point cardiology recommends increasing his metoprolol XL to 50 mg daily along with adding digoxin 0.125 mg daily.  This has been done so.  The patient at this point also will need continued diuresis and thus we have added him on Lasix and Aldactone.  The patient will need to follow-up with cardiology as an outpatient so that he is evaluated for an AICD given his low left ventricular ejection fraction and high risk for sudden death especially with him having repeated episodes of SVT.  Patient otherwise now is stabilized and can discharge home with outpatient follow-ups and monitoring.    Therefore, he is discharged in good and stable condition to home with close outpatient follow-up.    The patient met 2-midnight criteria for an  inpatient stay at the time of discharge.    Discharge Date  1/3/2021    FOLLOW UP ITEMS POST DISCHARGE  Follow-up with cardiology in 7 to 10 days  Follow-up with the primary care physician in 1 to 2 weeks    DISCHARGE DIAGNOSES  Principal Problem:    Community acquired pneumonia POA: Unknown  Active Problems:    Hypercoagulable state (HCC) POA: Yes      Overview: Polycythemia vera, takes outpatient Brillinta and Xarelto for h/o MI      Heparin drip perioperatively per Dr. Morfin.          Shortness of breath POA: Unknown    Pain in the chest POA: Unknown      Overview: IMO load March 2020    Type 2 diabetes mellitus, with long-term current use of insulin (HCC) (Chronic) POA: Yes      Overview: Chronic condition treated with Glipizide and Lantus.      Sliding scale insulin during acute hospitalization.          Benign prostatic hyperplasia without lower urinary tract symptoms (Chronic) POA: Yes  Resolved Problems:    * No resolved hospital problems. *      FOLLOW UP  Future Appointments   Date Time Provider Department Center   1/4/2021  8:50 AM JANES S[facility]   1/14/2021 10:00 AM ALVINO Cano [facility] None     No follow-up provider specified.    MEDICATIONS ON DISCHARGE     Medication List      START taking these medications      Instructions   amoxicillin-clavulanate 875-125 MG Tabs  Commonly known as: AUGMENTIN   Take 1 Tab by mouth 2 times a day for 5 days.  Dose: 1 Tab     azithromycin 250 MG Tabs  Commonly known as: ZITHROMAX   Take 1 Tab by mouth every day for 3 days.  Dose: 250 mg     digoxin 125 MCG Tabs  Commonly known as: LANOXIN   Take 1 Tab by mouth every day at 6 PM.  Dose: 125 mcg     furosemide 40 MG Tabs  Commonly known as: LASIX   Take 1 Tab by mouth 2 times a day for 30 days.  Dose: 40 mg     spironolactone 50 MG Tabs  Commonly known as: ALDACTONE   Take 1 Tab by mouth every day.  Dose: 50 mg        CHANGE how you take these medications      Instructions  "  lisinopril 2.5 MG Tabs  What changed: how much to take  Commonly known as: PRINIVIL   Take 2 Tabs by mouth every day.  Dose: 5 mg     metoprolol SR 50 MG Tb24  What changed:   · medication strength  · how much to take  Commonly known as: TOPROL XL   Take 1 Tab by mouth every evening for 30 days.  Dose: 50 mg     tamsulosin 0.4 MG capsule  What changed: how much to take  Commonly known as: FLOMAX   Take 2 Caps by mouth every day.  Dose: 0.8 mg     Tresiba FlexTouch 200 UNIT/ML Sopn  What changed: how much to take  Generic drug: Insulin Degludec   Inject 10 Units under the skin every evening.  Dose: 10 Units        CONTINUE taking these medications      Instructions   apixaban 5mg Tabs  Commonly known as: Eliquis   Doctor's comments: Please provide generic if available. Thank you!  Take 1 Tab by mouth 2 Times a Day.  Dose: 5 mg     aspirin EC 81 MG Tbec  Commonly known as: ECOTRIN   Take 1 Tab by mouth every day.  Dose: 81 mg     Blood Glucose Test Strips   verio test strips for glucose monitoring TID and PRN     finasteride 5 MG Tabs  Commonly known as: PROSCAR   Take 1 Tab by mouth every day.  Dose: 5 mg     hydroxyurea 500 MG Caps  Commonly known as: HYDREA   Take 500 mg by mouth 2 Times a Day.  Dose: 500 mg     mirtazapine 15 MG Tabs  Commonly known as: Remeron   TAKE 1 TABLET BY MOUTH EVERYDAY AT BEDTIME     naproxen 250 MG Tabs  Commonly known as: NAPROSYN   Take 500-750 mg by mouth 2 times a day as needed. Indications: Pain  Dose: 500-750 mg     therapeutic multivitamin-minerals Tabs   Take 1 Tab by mouth every day.  Dose: 1 Tab            Allergies  Allergies   Allergen Reactions   • Doxycycline      Swelling lips and difficulties swallowing   • Beta Adrenergic Blockers Unspecified     dizziness   • Metformin Unspecified and Palpitations     Cardiac effects  \"Similar to a heart attack, I was hospitalized\"   • Simvastatin      Other reaction(s): Other (Specify with Comments)  Myalgias  Myalgias   • Statins " [Hmg-Coa-R Inhibitors]      Muscle ache, joint pain       DIET  Orders Placed This Encounter   Procedures   • Diet Order Diet: Cardiac; Miscellaneous modifications: (optional): No Decaf, No Caffeine(for test)     Standing Status:   Standing     Number of Occurrences:   1     Order Specific Question:   Diet:     Answer:   Cardiac [6]     Order Specific Question:   Miscellaneous modifications: (optional)     Answer:   No Decaf, No Caffeine(for test) [11]       ACTIVITY  As tolerated.  Weight bearing as tolerated    CONSULTATIONS  Cardiology    PROCEDURES  None    LABORATORY  Lab Results   Component Value Date    SODIUM 138 01/02/2021    POTASSIUM 3.3 (L) 01/02/2021    CHLORIDE 104 01/02/2021    CO2 23 01/02/2021    GLUCOSE 141 (H) 01/02/2021    BUN 15 01/02/2021    CREATININE 0.83 01/02/2021        Lab Results   Component Value Date    WBC 6.5 01/02/2021    HEMOGLOBIN 10.5 (L) 01/02/2021    HEMATOCRIT 33.7 (L) 01/02/2021    PLATELETCT 303 01/02/2021        Total time of the discharge process exceeds 38 minutes.

## 2021-01-03 NOTE — PROGRESS NOTES
Patient discharged home. PIV and tele box removed. Discharge instructions explained to patient. All questions answered. Patient verbalizes understanding of discharge instructions. Patient has all personal belongings with them.

## 2021-01-03 NOTE — DISCHARGE INSTRUCTIONS
Discharge Instructions    Discharged to home by car with relative. Discharged via wheelchair, hospital escort: Yes.  Special equipment needed: Not Applicable    Be sure to schedule a follow-up appointment with your primary care doctor or any specialists as instructed.     Discharge Plan:   Diet Plan: Discussed  Activity Level: Discussed  Confirmed Follow up Appointment: Patient to Call and Schedule Appointment  Confirmed Symptoms Management: Discussed  Medication Reconciliation Updated: Yes  Influenza Vaccine Indication: Patient Refuses    I understand that a diet low in cholesterol, fat, and sodium is recommended for good health. Unless I have been given specific instructions below for another diet, I accept this instruction as my diet prescription.   Other diet: Cardiac    Special Instructions: None    · Is patient discharged on Warfarin / Coumadin?   No     Depression / Suicide Risk    As [facility] are [facility], it is important to learn how to keep safe from harming yourself.    Recognize the warning signs:  · Abrupt changes in personality, positive or negative- including increase in energy   · Giving away possessions  · Change in eating patterns- significant weight changes-  positive or negative  · Change in sleeping patterns- unable to sleep or sleeping all the time   · Unwillingness or inability to communicate  · Depression  · Unusual sadness, discouragement and loneliness  · Talk of wanting to die  · Neglect of personal appearance   · Rebelliousness- reckless behavior  · Withdrawal from people/activities they love  · Confusion- inability to concentrate     If you or a loved one observes any of these behaviors or has concerns about self-harm, here's what you can do:  · Talk about it- your feelings and reasons for harming yourself  · Remove any means that you might use to hurt yourself (examples: pills, rope, extension cords, firearm)  · Get professional help from the community  (Mental Health, Substance Abuse, psychological counseling)  · Do not be alone:Call your Safe Contact- someone whom you trust who will be there for you.  · Call your local CRISIS HOTLINE 551-7060 or 506-350-4010  · Call your local Children's Mobile Crisis Response Team [place] (055) 176-0484 or www.[employer]  · Call the toll free National Suicide Prevention Hotlines   · National Suicide Prevention Lifeline 896-079-HKPY (3161)  · National Hope Line Network 800-SUICIDE (331-3188)

## 2021-01-04 ENCOUNTER — TELEPHONE (OUTPATIENT)
Dept: ENDOCRINOLOGY | Facility: MEDICAL CENTER | Age: 71
End: 2021-01-04

## 2021-01-04 ENCOUNTER — OFFICE VISIT (OUTPATIENT)
Dept: ENDOCRINOLOGY | Facility: MEDICAL CENTER | Age: 71
End: 2021-01-04
Attending: INTERNAL MEDICINE
Payer: MEDICARE

## 2021-01-04 VITALS
HEART RATE: 69 BPM | DIASTOLIC BLOOD PRESSURE: 78 MMHG | BODY MASS INDEX: 23.6 KG/M2 | WEIGHT: 189.8 LBS | HEIGHT: 75 IN | OXYGEN SATURATION: 96 % | SYSTOLIC BLOOD PRESSURE: 120 MMHG

## 2021-01-04 DIAGNOSIS — E78.5 DYSLIPIDEMIA: ICD-10-CM

## 2021-01-04 DIAGNOSIS — E11.8 TYPE 2 DIABETES MELLITUS WITH COMPLICATION, WITHOUT LONG-TERM CURRENT USE OF INSULIN (HCC): ICD-10-CM

## 2021-01-04 DIAGNOSIS — Z79.4 ENCOUNTER FOR LONG-TERM (CURRENT) USE OF INSULIN (HCC): ICD-10-CM

## 2021-01-04 DIAGNOSIS — K86.2 PANCREATIC CYST: ICD-10-CM

## 2021-01-04 PROCEDURE — 99214 OFFICE O/P EST MOD 30 MIN: CPT | Performed by: INTERNAL MEDICINE

## 2021-01-04 PROCEDURE — 99211 OFF/OP EST MAY X REQ PHY/QHP: CPT | Performed by: INTERNAL MEDICINE

## 2021-01-04 RX ORDER — DAPAGLIFLOZIN 10 MG/1
1 TABLET, FILM COATED ORAL DAILY
Qty: 30 TAB | Refills: 11 | Status: SHIPPED | OUTPATIENT
Start: 2021-01-04 | End: 2021-04-06

## 2021-01-04 RX ORDER — ATORVASTATIN CALCIUM 40 MG/1
40 TABLET, FILM COATED ORAL DAILY
Qty: 30 TAB | Refills: 11 | Status: SHIPPED | OUTPATIENT
Start: 2021-01-04 | End: 2021-01-06

## 2021-01-04 RX ORDER — INSULIN DEGLUDEC 200 U/ML
12 INJECTION, SOLUTION SUBCUTANEOUS EVERY EVENING
Qty: 3 ML | Refills: 11 | Status: SHIPPED | OUTPATIENT
Start: 2021-01-04 | End: 2021-01-04

## 2021-01-04 RX ORDER — INSULIN DEGLUDEC 200 U/ML
12 INJECTION, SOLUTION SUBCUTANEOUS EVERY EVENING
Qty: 3 ML | Refills: 11 | Status: SHIPPED
Start: 2021-01-04 | End: 2022-02-17

## 2021-01-04 ASSESSMENT — FIBROSIS 4 INDEX: FIB4 SCORE: 1.05

## 2021-01-04 NOTE — PATIENT COMMUNICATION
Medical Decision Making:  Today's Assessment / Status / Plan:      Ischemic CM (EF 30%), NYHA Classs I-II:  -Remains well compensated on exam, no evidence of heart failure.   -Patient is not interested in ICD or life vest for primary prevention at this time, would like to try 90 days of GDMT first.   -Stop Midodrine.   -Continue Lisinopril 2.5 mg daily.  -Would like to re-try low dose BB, will start 12.5 mg of Toprol in 4-5 days after stopping his midodrine if BP remains >110/50. Consider Coreg if unable to tolerate it.   -If BP remains >110/50 in 10 days after starting BB, increase lisinopril to 5 mg daily.   -Chem panel in 3 weeks.      Notes above per JS at last OV 12/11/20. Pt recently in ER on 01/01/21, metoprolol increased to 50mg daily and digoxin 0.125mg daily.

## 2021-01-04 NOTE — PROGRESS NOTES
CHIEF COMPLAINT: Patient is here for follow up of Type 2 Diabetes Mellitus    HPI:     Francesco Schulte is a 70 y.o. male with Type 2 Diabetes Mellitus here for follow up.    Addendum we finally got his labs after his visit  Labs from January 20, 2021 show A1c is down to 7.4%  His LDL cholesterol is 90 with triglycerides 110,  Urine microalbumin creatinine ratio is 82  C-peptide is 3.1  TSH is normal at 1.5      Labs from 10/26/2020 HbA1c was 7.7%      He was previously seen by the PA  He has has a history of congestive heart failure with a non-preserved EF and was recently admitted at [facility] for bilateral pneumonia.      Currently he is not on any medication for his diabetes.    He was previously taking Jardiance and Trulicity but cannot afford these medications he was also on Tresiba.  He has a history of intolerance of Metformin.      Unfortunately he did not bring his glucose meter    We do not have an updated urine microalbumin on file  We do not have an updated lipid profile on file    He has a history of hyperlipidemia with an LDL cholesterol of 94 on August 6, 2019 lipids have not been repeated since that time    He is currently not on a statin for primary prevention    Incidentally he was also diagnosed with a pancreatic cyst on his most recent hospital admission.  He has a 1.6 cm pancreatic tail cyst that was incidentally discovered on chest CTA to rule out pulmonary embolism        BG Diary:01/04/21  Patient did not bring glucose meter despite repeated request to do so      Weight has been stable    Diabetes Complications   Retinopathy: No known retinopathy.  Last eye exam: He is overdue for an eye exam  Neuropathy: Denies paresthesias or numbness in hands or feet. Denies any foot wounds.  Exercise: Minimal.  Diet: Fair.  Patient's medications, allergies, and social histories were reviewed and updated as appropriate.    ROS:     CONS:     No fever, no chills   EYES:     No diplopia,  no blurry vision   CV:           No chest pain, no palpitations   PULM:     No SOB, no cough, no hemoptysis.   GI:            No nausea, no vomiting, no diarrhea, no constipation   ENDO:     No polyuria, no polydipsia, no heat intolerance, no cold intolerance       Past Medical History:  Problem List:  2021-01: Community acquired pneumonia  2020-11: Drug-induced neutropenia (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)  2020-07: Pancreatic cyst  2020-07: Hyperglycemia  2020-07: Lip swelling  2020-07: Medication intolerance  2020-07: Generalized pain  2020-07: Elevated brain natriuretic peptide (BNP) level  2019-11: Lupus ([facility])  2019-05: Encounter for long-term (current) use of insulin ([facility])  2019-04: Indigestion  2019-01: Fatigue  2019-01: Shortness of breath  2019-01: Pain in the chest  2019-01: Erythromelalgia ([facility])  2019-01: Polycythemia vera ([facility])  2018-10: Multiple joint pain  2018-08: Vertigo  2018-08: Elevated lactic acid level  2018-08: Nausea  2018-04: Elevated sed rate- R/O PMR  2018-04: New daily persistent headache- R/O PMR; trial steroids  2018-04: Nonintractable episodic headache  2018-03: Orthostatic hypotension  2018-03: Stenosis of right carotid artery-Right CEA 3/21/18  2018-03: Essential hypertension  2018-03: Hypercoagulable state ([facility])  2018-03: Chronic headache  2018-03: Nausea & vomiting  2018-03: Dizziness  2018-03: History of stroke- old right parietal/occipital  2018-03: Thrombocytopenia  ([facility])  2018-03: Leukopenia  2018-03: Hypokalemia  2018-03: Carotid stenosis, 90% right carotid  2018-01: Chronic daily headache  2018-01: Obesity (BMI 30-39.9)  2017-09: Agent orange exposure  2017-09: Chronic pain syndrome  2017-09: Risk for falls  2017-08: Long term (current) use of anticoagulants [Z79.01]  2017-03: History of ST elevation myocardial infarction (STEMI)  2017-03: SIRS (systemic inflammatory response syndrome) ([facility])  2017-03: ARF (acute renal failure) ([facility])  2017-03: Leukocytosis  2017-03: Hyponatremia  2017-03: Coagulopathy ([facility])  2017-03: Chronic systolic heart failure ([facility])  2017-03: Type 2 diabetes mellitus, with long-term current use of   insulin ([facility])  2017-03: CAD (coronary artery disease)  2017-03: Diabetes ([facility])  2017-03: Benign prostatic hyperplasia without lower urinary tract   symptoms  2017-03: Thrombocytosis ([facility])  2017-03: Dyslipidemia  2017-03: Paroxysmal A-fib ([facility])  2017-03: Statin intolerance  2017-03: MATTHIAS (acute kidney injury) ([facility])  2017-03: STEMI (ST elevation myocardial infarction) ([facility])  2016-12: Polycythemia  2016-12: A-fib ([facility])      Past Surgical History:  Past Surgical History:   Procedure Laterality Date   • SPLIT THICKNESS SKIN GRAFT N/A 8/23/2020    Procedure: APPLICATION, GRAFT, SKIN, SPLIT-THICKNESS;  Surgeon: Elisa Craig M.D.;  Location: [facility];  Service: Plastics   • SKIN ABSCESS INCISION AND DRAINAGE Right 8/3/2020    Procedure: INCISION AND DRAINAGE - SCROTAL WOUND - WOUND VAC PLACEMENT;  Surgeon: Jaylen Hayes M.D.;  Location: [facility];  Service: Urology   • PB EXPLORE SCROTUM Right 7/31/2020    Procedure: EXPLORATION, SCROTUM - FOR WASH OUT OF SCROTAL WOUND & WOUND VAC PLACEMENT;  Surgeon: Ferny Rosales M.D.;  Location: [facility];  Service: Urology   • PB EXPLORE SCROTUM Right 7/29/2020    Procedure: EXPLORATION, SCROTUM - FOR I & D OF SCROTAL AND  GROIN WOUND;  Surgeon: Donta Viera M.D.;   "Location: SURGERY [facility];  Service: Urology   • PB INCIS/DRAIN SCROTUM/TESTIS,EPIDIDYM Right 7/25/2020    Procedure: INCISION AND DRAINAGE, SCROTUM;  Surgeon: Nick Negro M.D.;  Location: SURGERY [facility];  Service: Urology   • TEMPORAL ARTERY BIOPSY Right 6/26/2018    Procedure: TEMPORAL ARTERY BIOPSY;  Surgeon: Rajendra Aguilar M.D.;  Location: SURGERY SAME DAY [facility];  Service: General   • CAROTID ENDARTERECTOMY Right 3/21/2018    Procedure: CAROTID ENDARTERECTOMY- W/EEG MONITORING;  Surgeon: Benny Morfin M.D.;  Location: SURGERY [facility];  Service: General   • APPENDECTOMY     • CATH PLACEMENT      right arm   • LAMINOTOMY      diskectomy; C4   • OTHER CARDIAC SURGERY      stents x 3        Allergies:  Doxycycline, Beta adrenergic blockers, Metformin, Simvastatin, and Statins [hmg-coa-r inhibitors]     Social History:  Social History     Tobacco Use   • Smoking status: Never Smoker   • Smokeless tobacco: Never Used   Substance Use Topics   • Alcohol use: No   • Drug use: No        Family History:   family history is not on file.      PHYSICAL EXAM:   OBJECTIVE:  Vital signs: /78 (BP Location: Left arm, Patient Position: Sitting, BP Cuff Size: Adult)   Pulse 69   Ht 1.905 m (6' 3\")   Wt 86.1 kg (189 lb 12.8 oz)   SpO2 96%   BMI 23.72 kg/m²   GENERAL: Well-developed, well-nourished in no apparent distress.   EYE:  No ocular asymmetry, PERRLA  HENT: Pink, moist mucous membranes.    NECK: No thyromegaly.   CARDIOVASCULAR:  No murmurs  LUNGS: Clear breath sounds  ABDOMEN: Soft, nontender   EXTREMITIES: No clubbing, cyanosis, or edema.   NEUROLOGICAL: No gross focal motor abnormalities   LYMPH: No cervical adenopathy seen  SKIN: No rashes, lesions.   Monofilament testing with a 10 gram force: sensation: intact bilaterally  Visual Inspection: Feet without maceration, ulcers, or fissures.  Pedal pulses: intact bilaterally    Labs:  Lab Results   Component Value " Date/Time    HBA1C 7.7 (H) 10/26/2020 02:01 PM        Lab Results   Component Value Date/Time    WBC 6.5 01/02/2021 03:56 AM    RBC 3.87 (L) 01/02/2021 03:56 AM    HEMOGLOBIN 10.5 (L) 01/02/2021 03:56 AM    MCV 87.1 01/02/2021 03:56 AM    MCH 27.1 01/02/2021 03:56 AM    MCHC 31.2 (L) 01/02/2021 03:56 AM    RDW 52.2 (H) 01/02/2021 03:56 AM    MPV 12.2 01/02/2021 03:56 AM       Lab Results   Component Value Date/Time    SODIUM 138 01/02/2021 03:56 AM    POTASSIUM 3.3 (L) 01/02/2021 03:56 AM    CHLORIDE 104 01/02/2021 03:56 AM    CO2 23 01/02/2021 03:56 AM    ANION 11.0 01/02/2021 03:56 AM    GLUCOSE 141 (H) 01/02/2021 03:56 AM    BUN 15 01/02/2021 03:56 AM    CREATININE 0.83 01/02/2021 03:56 AM    CALCIUM 8.7 01/02/2021 03:56 AM    ASTSGOT 14 01/02/2021 03:56 AM    ALTSGPT 9 01/02/2021 03:56 AM    TBILIRUBIN 0.6 01/02/2021 03:56 AM    ALBUMIN 3.2 01/02/2021 03:56 AM    TOTPROTEIN 7.3 01/02/2021 03:56 AM    GLOBULIN 4.1 (H) 01/02/2021 03:56 AM    AGRATIO 0.8 01/02/2021 03:56 AM       Lab Results   Component Value Date/Time    CHOLSTRLTOT 159 08/06/2019 0803    TRIGLYCERIDE 99 08/06/2019 0803    HDL 45 08/06/2019 0803    LDL 94 08/06/2019 0803       Lab Results   Component Value Date/Time    MALBCRT 29 09/27/2017 09:10 AM    MICROALBUR 4.1 09/27/2017 09:10 AM        Lab Results   Component Value Date/Time    TSHULTRASEN 0.459 10/02/2020 0420     No results found for: FREEDIR  No results found for: FREET3  No results found for: THYSTIMIG        ASSESSMENT/PLAN:     1. Type 2 diabetes mellitus with complication, without long-term current use of insulin (HCC)  Uncontrolled secondary to hyperglycemia  Restart Tresiba 10 units daily and titrate to target fasting sugar below 120   I gave him some samples  Start Farxiga 10 mg daily reviewed side effects and benefits of this medication and the importance of adequate hydration  We will plan for follow-up in 3 months with repeat of his A1c, lipids, and other labs    2.  Dyslipidemia  Unstable we will check a lipid panel with his next labs   I am starting him on atorvastatin for primary prevention    3. Pancreatic cyst  Stable recommend follow-up with his primary care physician for pancreatic cyst    4. Encounter for long-term (current) use of insulin (HCC)  Patient is on long-term insulin therapy for type 2 diabetes      Return in about 3 months (around 4/4/2021).      This patient during there office visit today was started on a new medication.  Side effects of the new medication were discussed with the patient today in the office.     Thank you kindly for allowing me to participate in the diabetes care plan for this patient.    Gamaliel Stephens MD, FACE, ECNU  01/04/21    CC:   ALVINO Harman

## 2021-01-05 NOTE — TELEPHONE ENCOUNTER
"Pt. Called stating that he was prescribed  Atorvastatin today at his visit. The Pharmacy had called him to give him a \"red flag warning\" about the medication and pt's allergy to statin's. Pt would like a call back on what to do from here.   "

## 2021-01-05 NOTE — TELEPHONE ENCOUNTER
"I spoke with Bob, he wanted to make sure you were aware that he has tried and failed 6 other statins and he is very hesitant to start this one.   He used \"Welchol\" for years and it works for him, is this a possibility? Please advise      "

## 2021-01-06 DIAGNOSIS — E78.5 DYSLIPIDEMIA: ICD-10-CM

## 2021-01-06 DIAGNOSIS — Z78.9 STATIN INTOLERANCE: ICD-10-CM

## 2021-01-06 RX ORDER — COLESEVELAM 180 1/1
1875 TABLET ORAL 2 TIMES DAILY WITH MEALS
Qty: 180 TAB | Refills: 11 | Status: ON HOLD | OUTPATIENT
Start: 2021-01-06 | End: 2021-07-29

## 2021-01-12 ENCOUNTER — TELEPHONE (OUTPATIENT)
Dept: MEDICAL GROUP | Facility: MEDICAL CENTER | Age: 71
End: 2021-01-12

## 2021-01-12 NOTE — PROGRESS NOTES
Chief Complaint   Patient presents with   • CHF (Systolic)   • Atrial Fibrillation   • MI (Non ST Segment Elevation MI)     F/V Dx: STEMI (ST elevation myocardial infarction)      This evaluation was conducted via [employer] using secure and encrypted videoconferencing technology. The patient was in a private location in the [facility].      The patient's identity was confirmed and verbal consent was obtained for this virtual visit.    Subjective:   Bob Schulte is a 70 y.o. male patient of Dr. Liliana Song who presents today for hospital follow up.    Patient was admitted on 1/1/2021 with bilateral pneumonia. He was treated with Azithromycin and Rocephin. Patient was also c/o chest pain, troponin remained WNL. A repeat TTE continued to show a reduced LVEF of 20%. During his hospitalization he had episodes of SVT. His Toprol was increased to 50 mg daily. He was started on digoxin 0.125 mcg daily, lasix 40 mg BID and Aldactone 50 mg daily. His lisinopril was increased to 5 mg daily.     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      Today patient states that he started feeling dehydrated and weak, he is now taking Lasix 40 mg daily instead of twice daily, lisinopril 2.5 mg daily instead of 5 mg daily and he is not taking the Aldactone at all. He denies having any symptoms of being volume overloaded, edema and SOB has resolved. Denies palpitations, chest pain, dizziness or syncope.     Past Medical History:   Diagnosis Date   • Anesthesia     resistant to pain meds   • Cancer (HCC)     polycythemia vera   •  Diabetes (HCC)     insulin and oral meds   • Family history of punctured lung 2002    left lung   • Heart attack (HCC) 03/2017   • Hemorrhagic disorder (HCC)     on blood thinners   • High cholesterol    • Ischemic cardiomyopathy    • Kidney stones     hx of kidney stones   • Orthostatic hypotension 3/29/2018   • Pain     headache pain   • Polycythemia vera(238.4)    • Stroke (HCC) 2016    r/t afib; eye issues resolved afterward   • Urinary bladder disorder     enlarged prostate, weak stream, difficulty urinating   • Vertigo      Past Surgical History:   Procedure Laterality Date   • SPLIT THICKNESS SKIN GRAFT N/A 8/23/2020    Procedure: APPLICATION, GRAFT, SKIN, SPLIT-THICKNESS;  Surgeon: Elisa Craig M.D.;  Location: [facility];  Service: Plastics   • SKIN ABSCESS INCISION AND DRAINAGE Right 8/3/2020    Procedure: INCISION AND DRAINAGE - SCROTAL WOUND - WOUND VAC PLACEMENT;  Surgeon: Jaylen Hayes M.D.;  Location: [facility];  Service: Urology   • PB EXPLORE SCROTUM Right 7/31/2020    Procedure: EXPLORATION, SCROTUM - FOR WASH OUT OF SCROTAL WOUND & WOUND VAC PLACEMENT;  Surgeon: Ferny Rosales M.D.;  Location: [facility];  Service: Urology   • PB EXPLORE SCROTUM Right 7/29/2020    Procedure: EXPLORATION, SCROTUM - FOR I & D OF SCROTAL AND  GROIN WOUND;  Surgeon: Donta Viera M.D.;  Location: [facility];  Service: Urology   • PB INCIS/DRAIN SCROTUM/TESTIS,EPIDIDYM Right 7/25/2020    Procedure: INCISION AND DRAINAGE, SCROTUM;  Surgeon: Nick Negro M.D.;  Location: [facility];  Service: Urology   • TEMPORAL ARTERY BIOPSY Right 6/26/2018    Procedure: TEMPORAL ARTERY BIOPSY;  Surgeon: Rajendra Aguilar M.D.;  Location: [facility] SAME DAY [facility];  Service: General   • CAROTID ENDARTERECTOMY Right 3/21/2018    Procedure: CAROTID ENDARTERECTOMY- W/EEG MONITORING;  Surgeon: Benny Morfin M.D.;  Location: [facility]  "[facility];  Service: General   • APPENDECTOMY     • CATH PLACEMENT      right arm   • LAMINOTOMY      diskectomy; C4   • OTHER CARDIAC SURGERY      stents x 3     History reviewed. No pertinent family history.  Social History     Socioeconomic History   • Marital status:      Spouse name: Not on file   • Number of children: Not on file   • Years of education: Not on file   • Highest education level: Not on file   Occupational History   • Not on file   Social Needs   • Financial resource strain: Not on file   • Food insecurity     Worry: Not on file     Inability: Not on file   • Transportation needs     Medical: Not on file     Non-medical: Not on file   Tobacco Use   • Smoking status: Never Smoker   • Smokeless tobacco: Never Used   Substance and Sexual Activity   • Alcohol use: No   • Drug use: No   • Sexual activity: Not on file   Lifestyle   • Physical activity     Days per week: Not on file     Minutes per session: Not on file   • Stress: Not on file   Relationships   • Social connections     Talks on phone: Not on file     Gets together: Not on file     Attends Church service: Not on file     Active member of club or organization: Not on file     Attends meetings of clubs or organizations: Not on file     Relationship status: Not on file   • Intimate partner violence     Fear of current or ex partner: Not on file     Emotionally abused: Not on file     Physically abused: Not on file     Forced sexual activity: Not on file   Other Topics Concern   • Not on file   Social History Narrative   • Not on file     Allergies   Allergen Reactions   • Doxycycline      Swelling lips and difficulties swallowing   • Beta Adrenergic Blockers Unspecified     dizziness   • Metformin Unspecified and Palpitations     Cardiac effects  \"Similar to a heart attack, I was hospitalized\"   • Simvastatin      Other reaction(s): Other (Specify with Comments)  Myalgias  Myalgias   • Statins [Hmg-Coa-R Inhibitors]      Muscle " ache, joint pain     Outpatient Encounter Medications as of 1/14/2021   Medication Sig Dispense Refill   • spironolactone (ALDACTONE) 25 MG Tab Take 1 Tab by mouth every day. 90 Tab 3   • furosemide (LASIX) 40 MG Tab Take 1 Tab by mouth 1 time a day as needed (SOB, EDEMA or weight gain of >3 lbs/day or 5 lbs/week.). 60 Tab 1   • Insulin Degludec (TRESIBA FLEXTOUCH) 200 UNIT/ML Solution Pen-injector Inject 12 Units under the skin every evening. 3 mL 11   • Dapagliflozin Propanediol (FARXIGA) 10 MG Tab Take 1 tablet by mouth every day. 30 Tab 11   • metoprolol SR (TOPROL XL) 50 MG TABLET SR 24 HR Take 1 Tab by mouth every evening for 30 days. 30 Tab 3   • digoxin (LANOXIN) 125 MCG Tab Take 1 Tab by mouth every day at 6 PM. 30 Tab 3   • lisinopril (PRINIVIL) 2.5 MG Tab Take 2 Tabs by mouth every day. (Patient taking differently: Take 2.5 mg by mouth every day.) 180 Tab 3   • Blood Glucose Test Strips verio test strips for glucose monitoring TID and  Each 5   • finasteride (PROSCAR) 5 MG Tab Take 1 Tab by mouth every day. 90 Tab 1   • hydroxyurea (HYDREA) 500 MG Cap Take 500 mg by mouth 2 Times a Day.     • apixaban (ELIQUIS) 5mg Tab Take 1 Tab by mouth 2 Times a Day. 180 Tab 0   • tamsulosin (FLOMAX) 0.4 MG capsule Take 2 Caps by mouth every day. (Patient taking differently: Take 0.4 mg by mouth every day.) 60 Cap 6   • naproxen (NAPROSYN) 250 MG Tab Take 500-750 mg by mouth 2 times a day as needed. Indications: Pain     • aspirin EC (ECOTRIN) 81 MG Tablet Delayed Response Take 1 Tab by mouth every day. 30 Tab    • therapeutic multivitamin-minerals (THERAGRAN-M) Tab Take 1 Tab by mouth every day.     • colesevelam (WELCHOL) 625 MG Tab Take 3 Tabs by mouth 2 times a day, with meals. (Patient not taking: Reported on 1/14/2021) 180 Tab 11   • [DISCONTINUED] furosemide (LASIX) 40 MG Tab Take 1 Tab by mouth 2 times a day for 30 days. (Patient taking differently: Take 40 mg by mouth every day.) 60 Tab 3   •  "[DISCONTINUED] spironolactone (ALDACTONE) 50 MG Tab Take 1 Tab by mouth every day. (Patient not taking: Reported on 1/14/2021) 30 Tab 3   • [DISCONTINUED] mirtazapine (REMERON) 15 MG Tab TAKE 1 TABLET BY MOUTH EVERYDAY AT BEDTIME (Patient not taking: Reported on 1/13/2021) 30 Tab 3     No facility-administered encounter medications on file as of 1/14/2021.      Review of Systems   Constitutional: Positive for malaise/fatigue. Negative for weight loss.   Respiratory: Negative for shortness of breath.    Cardiovascular: Negative for chest pain, palpitations, orthopnea, claudication, leg swelling and PND.   Neurological: Positive for weakness. Negative for dizziness.   All other systems reviewed and are negative.       Objective:   /75 (BP Location: Left arm, Patient Position: Sitting, BP Cuff Size: Adult)   Pulse 68   Ht 1.905 m (6' 3\")   Wt 78.5 kg (173 lb)   BMI 21.62 kg/m²     Physical Exam     Holter 10-9-19  Normal sinus rhythm   Occasional premature ventricular contractions (approximately 3.4% of total   beats)   No sustained arrhythmias noted      Echocardiogram:  8-  CONCLUSIONS  Prior echo 7/1/20, no significant changes are noted.  Left ventricular ejection fraction is visually estimated to be 30%.  Unable to estimate pulmonary artery pressure due to an inadequate tricuspid regurgitant jet.     Cardiac Catheterization:   DATE OF SERVICE:  03/11/2017     PROCEDURE:  Cardiac catheterization and Percutaneous Coronary Intervention.  A.  Left heart catheterization.  B.  Left ventriculography.  C.  Selective coronary angiography.  D.  Coronary aspiration thrombectomy of the left anterior descending artery.  E.  Coronary stent implantation of the proximal left anterior descending artery with a 4.0x8 mm drug-eluting Alpine Xience stent for in-stent restenosis.  F.  Coronary stent implantation of the mid left anterior descending artery with a 2.5x38 mm drug-eluting Xience Alpine stent post-dilated to " 3.0 mm.  G.  Right radial artery approach.     PREOPERATIVE DIAGNOSES:  1.  ST elevation myocardial infarction, anterior.  2.  Coronary artery disease with previous myocardial infarction and coronary   intervention in 2008 and 2010.  3.  Paroxysmal atrial fibrillation.  4.  Previous stroke, December 2016.  5.  Hyperlipidemia.  6.  Polycythemia rubra vera with thrombocytopenia.  7.  Diabetes mellitus.     POSTPROCEDURE DIAGNOSES:  1.  Subacute stent thrombosis.  2.  In-stent restenosis of the proximal left anterior descending artery stent.  3.  Severe diffuse obstructive coronary artery disease of the mid left anterior descending artery.     Stress Test: 1/6/2018  NUCLEAR IMAGING INTERPRETATION  Severe left ventriular dilation at rest.  No additional dilation noted with stress.  Chronic left bundle branch block at rest.  Extensive, large prior inferior infarct.  No evidence of ischemia.  Global hypokinesis with resting LVEF 20-30%.  ECG INTERPRETATION  Nondiagnostic.    Echocardiogram 12/3/2020:     CONCLUSIONS  Limited for evaluation of pericardial effusion.  Severely reduced left ventricular systolic function, EF estimated to be   20%.    Global hypokinesis with akinesis of the mid to apical LAD distribution.    Trivial anterior pericardial effusion without evidence of hemodynamic   compromise.     Compared to the images of the prior study done on 10-, effusion is smaller, EF appears further reduced.     Medical Decision Making:  Today's Assessment / Status / Plan:     Ischemic CM (EF 30%), NYHA Classs I-II:  -Volume status is stable per patient report.   -Patient continues to not interested in ICD or life vest for primary prevention at this time, would like to try 90 days of GDMT first.   -Continue Toprol XL 50 mg daily and lisinopril 2.5 mg daily.   -Resume Aldactone at reduced dose of 25 mg daily.  -Change Lasix to 40 mg daily prn SOB, edema or weight gain of >3 lbs/day or 5 lbs/week.   --Reinforced s/sx  "of worsening heart failure with patient and weight monitoring. Pt verbalizes understanding. Pt to call office or RTC if present.     CAD:  -S/P STEMI with multiple PCI (LAD, Lcs and POBA to the digonal) in setting of severe polycythemia.   -Denies Chest pain.  -Continue ASA 81 mg daily.     Carotid Artery Disease:  -S/P Right CEA in 2018.    HLD:  -LDL in 2019 was 94.  -Intolerant to statins, believes he has tried Zetia in the past.  -Open to discuss other options once his other medications are optimized as he does not want to change too much at one time.     Pericardial Effusion:  -\"Trivial\" in size per repeat TTE on 12/3/2020.     PAF:  -No known recurrence.   -OAC with Eliquis 5 mg BID.     SVT:  -Observed during last admission, patient denies recurrence.   -Toprol XL increased as above.     Hypotension:  -Remains stable off of Midodrine.      Patient will follow up with Dr. Liliana Song as scheduled below or earlier if needed. Encouraged patient to contact our office should any questions or concerns arise in the mean time.       Future Appointments   Date Time Provider Department Center   1/26/2021 11:00 AM Liliana Song M.D. [facility] None   4/6/2021  7:30 AM Gamaliel Stephens M.D. [facility]       "

## 2021-01-12 NOTE — TELEPHONE ENCOUNTER
ESTABLISHED PATIENT PRE-VISIT PLANNING     Patient was NOT contacted to complete PVP.     Note: Patient will not be contacted if there is no indication to call.     1.  Reviewed notes from the last few office visits within the medical group: Yes    2.  If any orders were placed at last visit or intended to be done for this visit (i.e. 6 mos follow-up), do we have Results/Consult Notes?         •  Labs - Labs were not ordered at last office visit.  Note: If patient appointment is for lab review and patient did not complete labs, check with provider if OK to reschedule patient until labs completed.       •  Imaging - Imaging was not ordered at last office visit.       •  Referrals - No referrals were ordered at last office visit.    3. Is this appointment scheduled as a Hospital Follow-Up? Yes, visit was at [facility].     4.  Immunizations were updated in Epic using Reconcile Outside Information activity? Yes    5.  Patient is due for the following Health Maintenance Topics:   Health Maintenance Due   Topic Date Due   • Annual Wellness Visit  1950   • IMM HEP B VACCINE (1 of 3 - Risk 3-dose series) 08/14/1971   • IMM ZOSTER VACCINES (1 of 2) 08/14/2002   • IMM PNEUMOCOCCAL VACCINE: 65+ Years (2 of 2 - PPSV23) 11/07/2019   • RETINAL SCREENING  04/02/2020   • FASTING LIPID PROFILE  08/06/2020   • IMM INFLUENZA (1) 09/01/2020   • A1C SCREENING  01/26/2021       6.  AHA (Pulse8) form printed for Provider? N/A

## 2021-01-13 ENCOUNTER — TELEMEDICINE (OUTPATIENT)
Dept: MEDICAL GROUP | Facility: MEDICAL CENTER | Age: 71
End: 2021-01-13
Payer: MEDICARE

## 2021-01-13 VITALS
HEART RATE: 68 BPM | WEIGHT: 173 LBS | HEIGHT: 75 IN | DIASTOLIC BLOOD PRESSURE: 75 MMHG | BODY MASS INDEX: 21.51 KG/M2 | SYSTOLIC BLOOD PRESSURE: 125 MMHG

## 2021-01-13 DIAGNOSIS — J18.9 COMMUNITY ACQUIRED PNEUMONIA, UNSPECIFIED LATERALITY: ICD-10-CM

## 2021-01-13 DIAGNOSIS — I50.22 CHRONIC SYSTOLIC HEART FAILURE (HCC): Chronic | ICD-10-CM

## 2021-01-13 PROCEDURE — 99213 OFFICE O/P EST LOW 20 MIN: CPT | Mod: 95,CR | Performed by: NURSE PRACTITIONER

## 2021-01-13 ASSESSMENT — PATIENT HEALTH QUESTIONNAIRE - PHQ9: CLINICAL INTERPRETATION OF PHQ2 SCORE: 0

## 2021-01-13 ASSESSMENT — FIBROSIS 4 INDEX: FIB4 SCORE: 1.05

## 2021-01-14 ENCOUNTER — TELEMEDICINE (OUTPATIENT)
Dept: CARDIOLOGY | Facility: MEDICAL CENTER | Age: 71
End: 2021-01-14
Payer: MEDICARE

## 2021-01-14 VITALS
SYSTOLIC BLOOD PRESSURE: 125 MMHG | HEIGHT: 75 IN | BODY MASS INDEX: 21.51 KG/M2 | DIASTOLIC BLOOD PRESSURE: 75 MMHG | HEART RATE: 68 BPM | WEIGHT: 173 LBS

## 2021-01-14 DIAGNOSIS — Z79.899 HIGH RISK MEDICATION USE: ICD-10-CM

## 2021-01-14 PROCEDURE — 99214 OFFICE O/P EST MOD 30 MIN: CPT | Mod: 95,CR | Performed by: NURSE PRACTITIONER

## 2021-01-14 RX ORDER — SPIRONOLACTONE 25 MG/1
25 TABLET ORAL DAILY
Qty: 90 TAB | Refills: 3 | Status: SHIPPED | OUTPATIENT
Start: 2021-01-14 | End: 2021-04-13

## 2021-01-14 RX ORDER — FUROSEMIDE 40 MG/1
40 TABLET ORAL
Qty: 60 TAB | Refills: 1 | Status: SHIPPED | OUTPATIENT
Start: 2021-01-14 | End: 2021-04-06

## 2021-01-14 ASSESSMENT — ENCOUNTER SYMPTOMS
CLAUDICATION: 0
PALPITATIONS: 0
WEIGHT LOSS: 0
WEAKNESS: 1
ORTHOPNEA: 0
PND: 0
DIZZINESS: 0
SHORTNESS OF BREATH: 0

## 2021-01-14 ASSESSMENT — FIBROSIS 4 INDEX: FIB4 SCORE: 1.05

## 2021-01-15 NOTE — PROGRESS NOTES
Telemedicine: Established Patient   This evaluation was conducted via Zoom using secure and encrypted videoconferencing technology. The patient was in a private location in the state of [place].    The patient's identity was confirmed and verbal consent was obtained for this virtual visit.    Subjective:   CC:   Chief Complaint   Patient presents with   • Follow-Up     ED visit 1/1/21- Chest pain/SOB       Francesco Schulte is a 70 y.o. male with extensive medical history including lupus, diabetes, polycythemia vera seen today for virtual visit to follow-up on recent hospitalization    Chronic systolic heart failure (HCC)  This is a chronic issue with recent exacerbation during hospitalization for pneumonia.  Echocardiogram during hospitalization with left ventricular ejection fraction of 20%.  He did have some medication changes during hospitalization including starting furosemide 40 mg twice daily, spironolactone 50 mg daily.  Metoprolol was increased from 25 to 50 mg daily and his lisinopril was also increased from 2.5 to 5 mg.  Since hospital discharge he has had significant diarrhea, now feels that he is dehydrated.  He independently stopped his spironolactone and his reduced Lasix to 40 mg daily.  Digoxin was also added  States that he is feeling much better since cutting back on the diuretics.  Feels that he is doing well with his breathing.  Denies any increase in lower extremity edema, activity intolerance, orthopnea    Community acquired pneumonia  Presented to the hospital recently with increased shortness of breath.  X-ray demonstrating bilateral pneumonia versus pulmonary edema.  He was treated with antibiotics and diuretics, discharged 1/3/2021.  He feels that he is gradually improving.  States that he is breathing much easier.  Still has slight cough but no chest pain.  Still feeling fatigued as well, but better than before.  No fever, chills, nausea, vomiting.  COVID-19 screening was  "negative        ROS      Allergies   Allergen Reactions   • Doxycycline      Swelling lips and difficulties swallowing   • Beta Adrenergic Blockers Unspecified     dizziness   • Metformin Unspecified and Palpitations     Cardiac effects  \"Similar to a heart attack, I was hospitalized\"   • Simvastatin      Other reaction(s): Other (Specify with Comments)  Myalgias  Myalgias   • Statins [Hmg-Coa-R Inhibitors]      Muscle ache, joint pain       Current medicines (including changes today)  Current Outpatient Medications   Medication Sig Dispense Refill   • colesevelam (WELCHOL) 625 MG Tab Take 3 Tabs by mouth 2 times a day, with meals. (Patient not taking: Reported on 1/14/2021) 180 Tab 11   • Insulin Degludec (TRESIBA FLEXTOUCH) 200 UNIT/ML Solution Pen-injector Inject 12 Units under the skin every evening. 3 mL 11   • Dapagliflozin Propanediol (FARXIGA) 10 MG Tab Take 1 tablet by mouth every day. 30 Tab 11   • metoprolol SR (TOPROL XL) 50 MG TABLET SR 24 HR Take 1 Tab by mouth every evening for 30 days. 30 Tab 3   • digoxin (LANOXIN) 125 MCG Tab Take 1 Tab by mouth every day at 6 PM. 30 Tab 3   • lisinopril (PRINIVIL) 2.5 MG Tab Take 2 Tabs by mouth every day. (Patient taking differently: Take 2.5 mg by mouth every day.) 180 Tab 3   • Blood Glucose Test Strips verio test strips for glucose monitoring TID and  Each 5   • finasteride (PROSCAR) 5 MG Tab Take 1 Tab by mouth every day. 90 Tab 1   • hydroxyurea (HYDREA) 500 MG Cap Take 500 mg by mouth 2 Times a Day.     • apixaban (ELIQUIS) 5mg Tab Take 1 Tab by mouth 2 Times a Day. 180 Tab 0   • tamsulosin (FLOMAX) 0.4 MG capsule Take 2 Caps by mouth every day. (Patient taking differently: Take 0.4 mg by mouth every day.) 60 Cap 6   • naproxen (NAPROSYN) 250 MG Tab Take 500-750 mg by mouth 2 times a day as needed. Indications: Pain     • aspirin EC (ECOTRIN) 81 MG Tablet Delayed Response Take 1 Tab by mouth every day. 30 Tab    • therapeutic multivitamin-minerals " (THERAGRAN-M) Tab Take 1 Tab by mouth every day.     • spironolactone (ALDACTONE) 25 MG Tab Take 1 Tab by mouth every day. 90 Tab 3   • furosemide (LASIX) 40 MG Tab Take 1 Tab by mouth 1 time a day as needed (SOB, EDEMA or weight gain of >3 lbs/day or 5 lbs/week.). 60 Tab 1     No current facility-administered medications for this visit.        Patient Active Problem List    Diagnosis Date Noted   • Community acquired pneumonia 01/02/2021     Priority: High   • Shortness of breath 01/05/2019     Priority: High   • Pain in the chest 01/05/2019     Priority: High   • Vertigo 08/09/2018     Priority: High   • Hypercoagulable state ([facility]) 03/21/2018     Priority: High   • Dizziness 03/10/2018     Priority: High   • History of ST elevation myocardial infarction (STEMI) 03/17/2017     Priority: High   • Coagulopathy ([facility]) 03/13/2017     Priority: High   • Chronic systolic heart failure ([facility]) 03/13/2017     Priority: High   • Thrombocytosis ([facility]) 03/12/2017     Priority: High   • STEMI (ST elevation myocardial infarction) ([facility]) 03/11/2017     Priority: High   • Chronic headache 03/21/2018     Priority: Medium   • History of stroke- old right parietal/occipital 03/10/2018     Priority: Medium   • Leukopenia 03/10/2018     Priority: Medium   • Carotid stenosis, 90% right carotid 03/10/2018     Priority: Medium   • Type 2 diabetes mellitus with complication, without long-term current use of insulin ([facility]) 03/13/2017     Priority: Medium   • CAD (coronary artery disease) 03/13/2017     Priority: Medium   • Paroxysmal A-fib ([facility]) 03/12/2017     Priority: Medium   • Polycythemia 12/01/2016     Priority: Medium   • Stenosis of right carotid artery-Right CEA 3/21/18 03/21/2018     Priority: Low   • Chronic pain syndrome 09/13/2017     Priority: Low   • SIRS (systemic inflammatory response syndrome) ([facility]) 03/16/2017     Priority: Low   • Benign prostatic hyperplasia without lower urinary tract symptoms 03/12/2017     Priority: Low    • Dyslipidemia 03/12/2017     Priority: Low   • Statin intolerance 03/12/2017     Priority: Low   • Drug-induced neutropenia ([facility]) 11/05/2020   • Insomnia due to medical condition 10/26/2020   • Pericardial effusion 10/01/2020   • QT prolongation 09/30/2020   • Urethral irritation 09/24/2020   • Medication side effect 09/22/2020   • Nephrolithiasis 08/30/2020   • History of TIA (transient ischemic attack) 08/20/2020   • Advanced care planning/counseling discussion 08/16/2020   • Constipation 08/15/2020   • Age-related physical debility 08/07/2020   • SLE (systemic lupus erythematosus related syndrome) ([facility]) 07/25/2020   • Pancreatic cyst 07/25/2020   • Hyperglycemia 07/02/2020   • Lip swelling 07/01/2020   • Medication intolerance 07/01/2020   • Generalized pain 07/01/2020   • Elevated brain natriuretic peptide (BNP) level 07/01/2020   • Lupus ([facility]) 11/15/2019   • Encounter for long-term (current) use of insulin ([facility]) 05/06/2019   • Indigestion 04/25/2019   • Polycythemia vera ([facility]) 01/03/2019   • Multiple joint pain 10/03/2018   • Nausea 08/09/2018   • Elevated sed rate- R/O PMR 04/04/2018   • New daily persistent headache- R/O PMR; trial steroids 04/04/2018   • Orthostatic hypotension 03/29/2018   • Chronic daily headache 01/09/2018   • Agent orange exposure 09/13/2017   • Risk for falls 09/13/2017   • Long term (current) use of anticoagulants [Z79.01] 08/21/2017       No family history on file.    He  has a past medical history of Anesthesia, Cancer ([facility]), Diabetes ([facility]), Family history of punctured lung (2002), Heart attack ([facility]) (03/2017), Hemorrhagic disorder ([facility]), High cholesterol, Ischemic cardiomyopathy, Kidney stones, Orthostatic hypotension (3/29/2018), Pain, Polycythemia vera(238.4), Stroke ([facility]) (2016), Urinary bladder disorder, and Vertigo.  He  has a past surgical history that includes other cardiac surgery; cath placement; laminotomy; appendectomy; carotid endarterectomy (Right, 3/21/2018);  "temporal artery biopsy (Right, 6/26/2018); pr incis/drain scrotum/testis,epididym (Right, 7/25/2020); pr explore scrotum (Right, 7/29/2020); pr explore scrotum (Right, 7/31/2020); skin abscess incision and drainage (Right, 8/3/2020); and split thickness skin graft (N/A, 8/23/2020).       Objective:   /75 (BP Location: Left arm, Patient Position: Sitting, BP Cuff Size: Adult)   Pulse 68   Ht 1.905 m (6' 3\")   Wt 78.5 kg (173 lb)   BMI 21.62 kg/m²     Physical Exam  Physical Exam:  Constitutional: Alert, no distress, well-groomed.  Skin: No rashes in visible areas.  Eye: Round. Conjunctiva clear, lids normal. No icterus.   ENMT: Lips pink without lesions, good dentition, moist mucous membranes. Phonation normal.  Neck: No masses, no thyromegaly. Moves freely without pain.  CV: Pulse as reported by patient  Respiratory: Unlabored respiratory effort. Minimal cough observed  Psych: Alert and oriented x3, normal affect and mood.     Assessment and Plan:   The following treatment plan was discussed:     1. Chronic systolic heart failure (HCC)  Recent hospital notes and echocardiogram reviewed.  He reports significant diarrhea over the last several days which he attributes to the furosemide and spironolactone started during hospital stay.  He has reduced furosemide and held spironolactone the last few days.  Denies any increase in shortness of breath or lower extremity swelling.  He does have follow-up planned tomorrow with cardiology, will defer to specialist for any further medication adjustment.  Dietary intervention for diarrhea discussed, may also try Imodium.  2. Community acquired pneumonia, unspecified laterality  Clinically improved.  No current shortness of breath, increasing cough, fever, chest pain.  Continue to monitor      Follow-up: As needed           "

## 2021-01-15 NOTE — ASSESSMENT & PLAN NOTE
Presented to the hospital recently with increased shortness of breath.  X-ray demonstrating bilateral pneumonia versus pulmonary edema.  He was treated with antibiotics and diuretics, discharged 1/3/2021.  He feels that he is gradually improving.  States that he is breathing much easier.  Still has slight cough but no chest pain.  Still feeling fatigued as well, but better than before.  No fever, chills, nausea, vomiting.  COVID-19 screening was negative

## 2021-01-15 NOTE — ASSESSMENT & PLAN NOTE
This is a chronic issue with recent exacerbation during hospitalization for pneumonia.  Echocardiogram during hospitalization with left ventricular ejection fraction of 20%.  He did have some medication changes during hospitalization including starting furosemide 40 mg twice daily, spironolactone 50 mg daily.  Metoprolol was increased from 25 to 50 mg daily and his lisinopril was also increased from 2.5 to 5 mg.  Since hospital discharge he has had significant diarrhea, now feels that he is dehydrated.  He independently stopped his spironolactone and his reduced Lasix to 40 mg daily.  Digoxin was also added  States that he is feeling much better since cutting back on the diuretics.  Feels that he is doing well with his breathing.  Denies any increase in lower extremity edema, activity intolerance, orthopnea

## 2021-01-20 ENCOUNTER — HOSPITAL ENCOUNTER (OUTPATIENT)
Dept: LAB | Facility: MEDICAL CENTER | Age: 71
End: 2021-01-20
Attending: INTERNAL MEDICINE
Payer: MEDICARE

## 2021-01-20 ENCOUNTER — HOSPITAL ENCOUNTER (OUTPATIENT)
Dept: LAB | Facility: MEDICAL CENTER | Age: 71
End: 2021-01-20
Attending: NURSE PRACTITIONER
Payer: MEDICARE

## 2021-01-20 DIAGNOSIS — E11.8 TYPE 2 DIABETES MELLITUS WITH COMPLICATION, WITHOUT LONG-TERM CURRENT USE OF INSULIN (HCC): ICD-10-CM

## 2021-01-20 DIAGNOSIS — Z79.899 HIGH RISK MEDICATION USE: ICD-10-CM

## 2021-01-20 DIAGNOSIS — E78.5 DYSLIPIDEMIA: ICD-10-CM

## 2021-01-20 LAB
ALBUMIN SERPL BCP-MCNC: 3.6 G/DL (ref 3.2–4.9)
ALBUMIN SERPL BCP-MCNC: 3.6 G/DL (ref 3.2–4.9)
ALBUMIN/GLOB SERPL: 0.8 G/DL
ALBUMIN/GLOB SERPL: 0.8 G/DL
ALP SERPL-CCNC: 57 U/L (ref 30–99)
ALP SERPL-CCNC: 57 U/L (ref 30–99)
ALT SERPL-CCNC: 17 U/L (ref 2–50)
ALT SERPL-CCNC: 17 U/L (ref 2–50)
ANION GAP SERPL CALC-SCNC: 10 MMOL/L (ref 7–16)
ANION GAP SERPL CALC-SCNC: 11 MMOL/L (ref 7–16)
ANION GAP SERPL CALC-SCNC: 12 MMOL/L (ref 7–16)
AST SERPL-CCNC: 24 U/L (ref 12–45)
AST SERPL-CCNC: 25 U/L (ref 12–45)
BASOPHILS # BLD AUTO: 0.8 % (ref 0–1.8)
BASOPHILS # BLD AUTO: 0.9 % (ref 0–1.8)
BASOPHILS # BLD: 0.04 K/UL (ref 0–0.12)
BASOPHILS # BLD: 0.05 K/UL (ref 0–0.12)
BILIRUB SERPL-MCNC: 0.4 MG/DL (ref 0.1–1.5)
BILIRUB SERPL-MCNC: 0.5 MG/DL (ref 0.1–1.5)
BUN SERPL-MCNC: 22 MG/DL (ref 8–22)
BUN SERPL-MCNC: 22 MG/DL (ref 8–22)
BUN SERPL-MCNC: 23 MG/DL (ref 8–22)
CALCIUM SERPL-MCNC: 9.4 MG/DL (ref 8.4–10.2)
CALCIUM SERPL-MCNC: 9.4 MG/DL (ref 8.4–10.2)
CALCIUM SERPL-MCNC: 9.5 MG/DL (ref 8.4–10.2)
CHLORIDE SERPL-SCNC: 105 MMOL/L (ref 96–112)
CHLORIDE SERPL-SCNC: 105 MMOL/L (ref 96–112)
CHLORIDE SERPL-SCNC: 106 MMOL/L (ref 96–112)
CHOLEST SERPL-MCNC: 145 MG/DL (ref 100–199)
CO2 SERPL-SCNC: 22 MMOL/L (ref 20–33)
CO2 SERPL-SCNC: 22 MMOL/L (ref 20–33)
CO2 SERPL-SCNC: 23 MMOL/L (ref 20–33)
CREAT SERPL-MCNC: 0.99 MG/DL (ref 0.5–1.4)
CREAT SERPL-MCNC: 1 MG/DL (ref 0.5–1.4)
CREAT SERPL-MCNC: 1.01 MG/DL (ref 0.5–1.4)
CREAT UR-MCNC: 65.71 MG/DL
CRP SERPL HS-MCNC: 0.2 MG/DL (ref 0–0.75)
DIGOXIN SERPL-MCNC: 0.6 NG/ML (ref 0.8–2)
EOSINOPHIL # BLD AUTO: 0.11 K/UL (ref 0–0.51)
EOSINOPHIL # BLD AUTO: 0.11 K/UL (ref 0–0.51)
EOSINOPHIL NFR BLD: 2 % (ref 0–6.9)
EOSINOPHIL NFR BLD: 2.1 % (ref 0–6.9)
ERYTHROCYTE [DISTWIDTH] IN BLOOD BY AUTOMATED COUNT: 50.2 FL (ref 35.9–50)
ERYTHROCYTE [DISTWIDTH] IN BLOOD BY AUTOMATED COUNT: 50.6 FL (ref 35.9–50)
ERYTHROCYTE [SEDIMENTATION RATE] IN BLOOD BY WESTERGREN METHOD: 71 MM/HOUR (ref 0–20)
EST. AVERAGE GLUCOSE BLD GHB EST-MCNC: 166 MG/DL
FASTING STATUS PATIENT QL REPORTED: NORMAL
GLOBULIN SER CALC-MCNC: 4.4 G/DL (ref 1.9–3.5)
GLOBULIN SER CALC-MCNC: 4.6 G/DL (ref 1.9–3.5)
GLUCOSE SERPL-MCNC: 140 MG/DL (ref 65–99)
GLUCOSE SERPL-MCNC: 143 MG/DL (ref 65–99)
GLUCOSE SERPL-MCNC: 143 MG/DL (ref 65–99)
HBA1C MFR BLD: 7.4 % (ref 0–5.6)
HCT VFR BLD AUTO: 41.1 % (ref 42–52)
HCT VFR BLD AUTO: 41.3 % (ref 42–52)
HDLC SERPL-MCNC: 33 MG/DL
HGB BLD-MCNC: 12.5 G/DL (ref 14–18)
HGB BLD-MCNC: 12.6 G/DL (ref 14–18)
IMM GRANULOCYTES # BLD AUTO: 0.02 K/UL (ref 0–0.11)
IMM GRANULOCYTES # BLD AUTO: 0.02 K/UL (ref 0–0.11)
IMM GRANULOCYTES NFR BLD AUTO: 0.4 % (ref 0–0.9)
IMM GRANULOCYTES NFR BLD AUTO: 0.4 % (ref 0–0.9)
LDLC SERPL CALC-MCNC: 90 MG/DL
LYMPHOCYTES # BLD AUTO: 0.26 K/UL (ref 1–4.8)
LYMPHOCYTES # BLD AUTO: 0.27 K/UL (ref 1–4.8)
LYMPHOCYTES NFR BLD: 5 % (ref 22–41)
LYMPHOCYTES NFR BLD: 5 % (ref 22–41)
MCH RBC QN AUTO: 26.2 PG (ref 27–33)
MCH RBC QN AUTO: 26.6 PG (ref 27–33)
MCHC RBC AUTO-ENTMCNC: 30.3 G/DL (ref 33.7–35.3)
MCHC RBC AUTO-ENTMCNC: 30.7 G/DL (ref 33.7–35.3)
MCV RBC AUTO: 86.4 FL (ref 81.4–97.8)
MCV RBC AUTO: 86.7 FL (ref 81.4–97.8)
MICROALBUMIN UR-MCNC: 5.4 MG/DL
MICROALBUMIN/CREAT UR: 82 MG/G (ref 0–30)
MONOCYTES # BLD AUTO: 0.22 K/UL (ref 0–0.85)
MONOCYTES # BLD AUTO: 0.25 K/UL (ref 0–0.85)
MONOCYTES NFR BLD AUTO: 4.3 % (ref 0–13.4)
MONOCYTES NFR BLD AUTO: 4.6 % (ref 0–13.4)
NEUTROPHILS # BLD AUTO: 4.51 K/UL (ref 1.82–7.42)
NEUTROPHILS # BLD AUTO: 4.73 K/UL (ref 1.82–7.42)
NEUTROPHILS NFR BLD: 87.1 % (ref 44–72)
NEUTROPHILS NFR BLD: 87.4 % (ref 44–72)
NRBC # BLD AUTO: 0 K/UL
NRBC # BLD AUTO: 0 K/UL
NRBC BLD-RTO: 0 /100 WBC
NRBC BLD-RTO: 0 /100 WBC
PLATELET # BLD AUTO: 138 K/UL (ref 164–446)
PLATELET # BLD AUTO: 141 K/UL (ref 164–446)
POTASSIUM SERPL-SCNC: 4.3 MMOL/L (ref 3.6–5.5)
PROT SERPL-MCNC: 8 G/DL (ref 6–8.2)
PROT SERPL-MCNC: 8.2 G/DL (ref 6–8.2)
RBC # BLD AUTO: 4.74 M/UL (ref 4.7–6.1)
RBC # BLD AUTO: 4.78 M/UL (ref 4.7–6.1)
SODIUM SERPL-SCNC: 138 MMOL/L (ref 135–145)
SODIUM SERPL-SCNC: 139 MMOL/L (ref 135–145)
SODIUM SERPL-SCNC: 139 MMOL/L (ref 135–145)
T4 FREE SERPL-MCNC: 1.24 NG/DL (ref 0.93–1.7)
TRIGL SERPL-MCNC: 110 MG/DL (ref 0–149)
TSH SERPL DL<=0.005 MIU/L-ACNC: 1.51 UIU/ML (ref 0.38–5.33)
WBC # BLD AUTO: 5.3 K/UL (ref 4.8–10.8)
WBC # BLD AUTO: 5.4 K/UL (ref 4.8–10.8)

## 2021-01-20 PROCEDURE — 86160 COMPLEMENT ANTIGEN: CPT

## 2021-01-20 PROCEDURE — 84681 ASSAY OF C-PEPTIDE: CPT

## 2021-01-20 PROCEDURE — 85025 COMPLETE CBC W/AUTO DIFF WBC: CPT

## 2021-01-20 PROCEDURE — 80053 COMPREHEN METABOLIC PANEL: CPT

## 2021-01-20 PROCEDURE — 36415 COLL VENOUS BLD VENIPUNCTURE: CPT

## 2021-01-20 PROCEDURE — 86140 C-REACTIVE PROTEIN: CPT

## 2021-01-20 PROCEDURE — 85652 RBC SED RATE AUTOMATED: CPT

## 2021-01-20 PROCEDURE — 80162 ASSAY OF DIGOXIN TOTAL: CPT

## 2021-01-20 PROCEDURE — 82570 ASSAY OF URINE CREATININE: CPT

## 2021-01-20 PROCEDURE — 80053 COMPREHEN METABOLIC PANEL: CPT | Mod: 91

## 2021-01-20 PROCEDURE — 85025 COMPLETE CBC W/AUTO DIFF WBC: CPT | Mod: 91

## 2021-01-20 PROCEDURE — 82043 UR ALBUMIN QUANTITATIVE: CPT

## 2021-01-20 PROCEDURE — 80048 BASIC METABOLIC PNL TOTAL CA: CPT

## 2021-01-20 PROCEDURE — 84439 ASSAY OF FREE THYROXINE: CPT

## 2021-01-20 PROCEDURE — 80061 LIPID PANEL: CPT

## 2021-01-20 PROCEDURE — 84443 ASSAY THYROID STIM HORMONE: CPT

## 2021-01-20 PROCEDURE — 83036 HEMOGLOBIN GLYCOSYLATED A1C: CPT | Mod: GA

## 2021-01-20 PROCEDURE — 86341 ISLET CELL ANTIBODY: CPT

## 2021-01-21 LAB
C3 SERPL-MCNC: 46.9 MG/DL (ref 87–200)
C4 SERPL-MCNC: 4.1 MG/DL (ref 19–52)

## 2021-01-22 LAB — C PEPTIDE SERPL-MCNC: 3.1 NG/ML (ref 0.8–3.5)

## 2021-01-24 LAB — GAD65 AB SER IA-ACNC: <5 IU/ML (ref 0–5)

## 2021-01-26 ENCOUNTER — TELEMEDICINE (OUTPATIENT)
Dept: CARDIOLOGY | Facility: MEDICAL CENTER | Age: 71
End: 2021-01-26
Payer: MEDICARE

## 2021-01-26 VITALS
WEIGHT: 176 LBS | DIASTOLIC BLOOD PRESSURE: 80 MMHG | HEART RATE: 68 BPM | HEIGHT: 75 IN | BODY MASS INDEX: 21.88 KG/M2 | SYSTOLIC BLOOD PRESSURE: 125 MMHG

## 2021-01-26 DIAGNOSIS — I50.22 CHRONIC SYSTOLIC HEART FAILURE (HCC): Chronic | ICD-10-CM

## 2021-01-26 DIAGNOSIS — I95.1 ORTHOSTATIC HYPOTENSION: ICD-10-CM

## 2021-01-26 DIAGNOSIS — R19.7 DIARRHEA, UNSPECIFIED TYPE: ICD-10-CM

## 2021-01-26 DIAGNOSIS — I48.0 PAROXYSMAL A-FIB (HCC): Chronic | ICD-10-CM

## 2021-01-26 DIAGNOSIS — I47.10 SVT (SUPRAVENTRICULAR TACHYCARDIA) (HCC): ICD-10-CM

## 2021-01-26 PROCEDURE — 99442 PR PHYSICIAN TELEPHONE EVALUATION 11-20 MIN: CPT | Mod: CR | Performed by: INTERNAL MEDICINE

## 2021-01-26 ASSESSMENT — FIBROSIS 4 INDEX: FIB4 SCORE: 2.81

## 2021-01-26 NOTE — PROGRESS NOTES
Telephone Appointment Visit   As a means of avoiding spread of COVID-19, this visit is being conducted by telephone. This telephone visit was initiated by the patient and they verbally consented.  Dox[employer]ity not available.  Patient unable to use Zoom.    Time at start of call: 11:17 AM    Reason for Call:  Symptom Follow-up    HPI:    Patient was admitted to the hospital in January with dyspnea was diagnosed with a pneumonia and heart failure exacerbation.  He was started on multiple medications.    His main concern today is frequent/severe diarrhea since he left the hospital in January.     He was started on metoprolol in December 2020 at 12.5 mg once a day which was increased to 50 mg once daily in the hospital.    Of note, he is also been diagnosed with lupus about 2 years ago and is following with rheumatologist, Dr. Horan.    Labs / Images Reviewed:   none    Assessment and Plan:     1. Chronic systolic heart failure (HCC)  Reports feeling well. No heart failure symptoms. Continue lisinopril and Lasix at current dose.    2. Orthostatic hypotension  No recurrent symptoms.    3. Paroxysmal A-fib (HCC)  He has not had any recurrent symptoms. Continue Eliquis.     4. Diarrhea, unspecified type  New onset diarrhea since his hospitalization, likely secondary to metoprolol. Will discontinue for now. He will send a [employer] message to us in about 2 weeks with an update about his symptoms.  Will reevaluate beta-blockers given his underlying cardiomyopathy at that time.    5. SVT (supraventricular tachycardia) (HCC)  SVT could recur with discontinuation of metoprolol. Vagal maneuvers have been discussed with the patient along with ER precautions.  He is already on digoxin for management of his SVT.    Follow-up: 3 months    Time at end of call: 11:35 AM  Total Time Spent: 11-20 minutes    Liliana Song M.D.

## 2021-01-26 NOTE — LETTER
[facility]   [address]  [place] [address]  Phone: [phone]  Fax: [phone]              Francesco Schulte  1950    Encounter Date: 1/26/2021    Liliana Song M.D.          PROGRESS NOTE:    Telephone Appointment Visit   As a means of avoiding spread of COVID-19, this visit is being conducted by telephone. This telephone visit was initiated by the patient and they verbally consented.    Time at start of call: 11:17 AM    Reason for Call:  Symptom Follow-up    HPI:    Patient was admitted to the hospital in January with dyspnea was diagnosed with a pneumonia and heart failure exacerbation.  He was started on multiple medications.    His main concern today is frequent/severe diarrhea since he left the hospital in January.     He was started on metoprolol in December 2020 at 12.5 mg once a day which was increased to 50 mg once daily in the hospital.    Of note, he is also been diagnosed with lupus about 2 years ago and is following with rheumatologist, Dr. Horan.    Labs / Images Reviewed:   none    Assessment and Plan:     1. Chronic systolic heart failure (HCC)  Reports feeling well. No heart failure symptoms. Continue lisinopril and Lasix at current dose.    2. Orthostatic hypotension  No recurrent symptoms.    3. Paroxysmal A-fib (HCC)  He has not had any recurrent symptoms. Continue Eliquis.     4. Diarrhea, unspecified type  New onset diarrhea since his hospitalization, likely secondary to metoprolol. Will discontinue for now. He will send a [employer] message to us in about 2 weeks with an update about his symptoms.  Will reevaluate beta-blockers given his underlying cardiomyopathy at that time.    5. SVT (supraventricular tachycardia) (HCC)  SVT could recur with discontinuation of metoprolol. Vagal maneuvers have been discussed with the patient along with ER precautions.  He is already on digoxin for management of his SVT.    Follow-up: 3  months    Time at end of call: 11:35 AM  Total Time Spent: 11-20 minutes    JANES Wilks, ATUL.P.R.N.  [address]  [address]  [facility] [address]  Via In Basket

## 2021-02-02 ENCOUNTER — PATIENT MESSAGE (OUTPATIENT)
Dept: CARDIOLOGY | Facility: MEDICAL CENTER | Age: 71
End: 2021-02-02

## 2021-02-05 RX ORDER — METOPROLOL SUCCINATE 50 MG/1
50 TABLET, EXTENDED RELEASE ORAL EVERY EVENING
Qty: 30 TAB | Refills: 3 | COMMUNITY
Start: 2021-02-05 | End: 2021-02-08 | Stop reason: CLARIF

## 2021-02-06 NOTE — PATIENT COMMUNICATION
Liliana Song M.D.  You Just now (4:58 PM)     Would go back on metoprolol. Talk to PCP about diarrhea.       MAR sarah.

## 2021-02-07 DIAGNOSIS — I48.0 PAF (PAROXYSMAL ATRIAL FIBRILLATION) (HCC): ICD-10-CM

## 2021-02-08 DIAGNOSIS — I47.10 SVT (SUPRAVENTRICULAR TACHYCARDIA) (HCC): ICD-10-CM

## 2021-02-08 DIAGNOSIS — I48.0 PAF (PAROXYSMAL ATRIAL FIBRILLATION) (HCC): ICD-10-CM

## 2021-02-08 DIAGNOSIS — I95.1 ORTHOSTATIC HYPOTENSION: ICD-10-CM

## 2021-02-08 RX ORDER — METOPROLOL SUCCINATE 25 MG/1
12.5 TABLET, EXTENDED RELEASE ORAL EVERY EVENING
Qty: 45 TAB | Refills: 1 | Status: SHIPPED | OUTPATIENT
Start: 2021-02-08 | End: 2021-03-19 | Stop reason: SDUPTHER

## 2021-02-10 RX ORDER — APIXABAN 5 MG/1
TABLET, FILM COATED ORAL
Qty: 180 TABLET | Refills: 0 | Status: SHIPPED | OUTPATIENT
Start: 2021-02-10 | End: 2021-05-05

## 2021-03-03 DIAGNOSIS — Z23 NEED FOR VACCINATION: ICD-10-CM

## 2021-03-15 RX ORDER — FINASTERIDE 5 MG/1
TABLET, FILM COATED ORAL
Qty: 90 TABLET | Refills: 1 | Status: SHIPPED | OUTPATIENT
Start: 2021-03-15 | End: 2021-10-14

## 2021-03-16 ENCOUNTER — PATIENT MESSAGE (OUTPATIENT)
Dept: CARDIOLOGY | Facility: MEDICAL CENTER | Age: 71
End: 2021-03-16

## 2021-03-17 ENCOUNTER — TELEPHONE (OUTPATIENT)
Dept: ENDOCRINOLOGY | Facility: MEDICAL CENTER | Age: 71
End: 2021-03-17

## 2021-03-17 DIAGNOSIS — E11.8 TYPE 2 DIABETES MELLITUS WITH COMPLICATION, WITHOUT LONG-TERM CURRENT USE OF INSULIN (HCC): ICD-10-CM

## 2021-03-17 RX ORDER — BLOOD-GLUCOSE METER
1 EACH MISCELLANEOUS
Qty: 1 KIT | Refills: 1 | Status: SHIPPED | OUTPATIENT
Start: 2021-03-17 | End: 2022-05-31

## 2021-03-18 NOTE — TELEPHONE ENCOUNTER
VOICEMAIL  1. Caller Name: Francesco Schulte                        Call Back Number: [phone]      2. Message: Patient called because he lost his verio machine and he needs a prescription sent over to [facility] in [facility]. He had no device to check his glucose.     3. Patient approves office to leave a detailed voicemail/MyChart message: yes

## 2021-03-19 DIAGNOSIS — I47.10 SVT (SUPRAVENTRICULAR TACHYCARDIA) (HCC): ICD-10-CM

## 2021-03-19 DIAGNOSIS — I48.0 PAF (PAROXYSMAL ATRIAL FIBRILLATION) (HCC): ICD-10-CM

## 2021-03-19 RX ORDER — METOPROLOL SUCCINATE 25 MG/1
25 TABLET, EXTENDED RELEASE ORAL EVERY EVENING
Qty: 90 TABLET | Refills: 1 | Status: SHIPPED | OUTPATIENT
Start: 2021-03-19 | End: 2021-04-13 | Stop reason: SDUPTHER

## 2021-03-20 NOTE — PATIENT COMMUNICATION
Discussed with ezekiel WOODARD to continue metoprolol 25mg. Pt should monitor BP and HR and must notify our office before making medication changes for adherence safety.

## 2021-04-02 ENCOUNTER — HOSPITAL ENCOUNTER (OUTPATIENT)
Dept: LAB | Facility: MEDICAL CENTER | Age: 71
End: 2021-04-02
Attending: INTERNAL MEDICINE
Payer: MEDICARE

## 2021-04-02 DIAGNOSIS — E11.8 TYPE 2 DIABETES MELLITUS WITH COMPLICATION, WITHOUT LONG-TERM CURRENT USE OF INSULIN (HCC): ICD-10-CM

## 2021-04-02 DIAGNOSIS — E78.5 DYSLIPIDEMIA: ICD-10-CM

## 2021-04-02 DIAGNOSIS — Z79.4 ENCOUNTER FOR LONG-TERM (CURRENT) USE OF INSULIN (HCC): ICD-10-CM

## 2021-04-02 LAB
ALBUMIN SERPL BCP-MCNC: 3.5 G/DL (ref 3.2–4.9)
ALBUMIN/GLOB SERPL: 0.7 G/DL
ALP SERPL-CCNC: 60 U/L (ref 30–99)
ALT SERPL-CCNC: 13 U/L (ref 2–50)
ANION GAP SERPL CALC-SCNC: 10 MMOL/L (ref 7–16)
AST SERPL-CCNC: 22 U/L (ref 12–45)
BILIRUB SERPL-MCNC: 0.6 MG/DL (ref 0.1–1.5)
BUN SERPL-MCNC: 16 MG/DL (ref 8–22)
CALCIUM SERPL-MCNC: 9.3 MG/DL (ref 8.4–10.2)
CHLORIDE SERPL-SCNC: 101 MMOL/L (ref 96–112)
CO2 SERPL-SCNC: 26 MMOL/L (ref 20–33)
CREAT SERPL-MCNC: 0.89 MG/DL (ref 0.5–1.4)
FASTING STATUS PATIENT QL REPORTED: NORMAL
GLOBULIN SER CALC-MCNC: 4.7 G/DL (ref 1.9–3.5)
GLUCOSE SERPL-MCNC: 102 MG/DL (ref 65–99)
POTASSIUM SERPL-SCNC: 3.9 MMOL/L (ref 3.6–5.5)
PROT SERPL-MCNC: 8.2 G/DL (ref 6–8.2)
SODIUM SERPL-SCNC: 137 MMOL/L (ref 135–145)

## 2021-04-02 PROCEDURE — 83036 HEMOGLOBIN GLYCOSYLATED A1C: CPT

## 2021-04-02 PROCEDURE — 36415 COLL VENOUS BLD VENIPUNCTURE: CPT

## 2021-04-02 PROCEDURE — 80053 COMPREHEN METABOLIC PANEL: CPT

## 2021-04-03 LAB
EST. AVERAGE GLUCOSE BLD GHB EST-MCNC: 157 MG/DL
HBA1C MFR BLD: 7.1 % (ref 4–5.6)

## 2021-04-06 ENCOUNTER — OFFICE VISIT (OUTPATIENT)
Dept: ENDOCRINOLOGY | Facility: MEDICAL CENTER | Age: 71
End: 2021-04-06
Attending: INTERNAL MEDICINE
Payer: MEDICARE

## 2021-04-06 VITALS
RESPIRATION RATE: 14 BRPM | OXYGEN SATURATION: 97 % | HEART RATE: 74 BPM | HEIGHT: 75 IN | SYSTOLIC BLOOD PRESSURE: 118 MMHG | DIASTOLIC BLOOD PRESSURE: 70 MMHG | WEIGHT: 193.2 LBS | BODY MASS INDEX: 24.02 KG/M2

## 2021-04-06 DIAGNOSIS — Z79.4 ENCOUNTER FOR LONG-TERM (CURRENT) USE OF INSULIN (HCC): ICD-10-CM

## 2021-04-06 DIAGNOSIS — E78.5 DYSLIPIDEMIA: ICD-10-CM

## 2021-04-06 DIAGNOSIS — E11.8 TYPE 2 DIABETES MELLITUS WITH COMPLICATION, WITHOUT LONG-TERM CURRENT USE OF INSULIN (HCC): ICD-10-CM

## 2021-04-06 DIAGNOSIS — Z78.9 STATIN INTOLERANCE: ICD-10-CM

## 2021-04-06 PROCEDURE — 99211 OFF/OP EST MAY X REQ PHY/QHP: CPT | Performed by: INTERNAL MEDICINE

## 2021-04-06 PROCEDURE — 99214 OFFICE O/P EST MOD 30 MIN: CPT | Performed by: INTERNAL MEDICINE

## 2021-04-06 RX ORDER — BLOOD SUGAR DIAGNOSTIC
STRIP MISCELLANEOUS
Status: ON HOLD | COMMUNITY
Start: 2021-01-30 | End: 2021-07-29

## 2021-04-06 ASSESSMENT — FIBROSIS 4 INDEX: FIB4 SCORE: 2.94

## 2021-04-06 NOTE — PROGRESS NOTES
CHIEF COMPLAINT: Patient is here for follow up of Type 2 Diabetes Mellitus    HPI:     Francesco Schulte is a 70 y.o. male with Type 2 Diabetes Mellitus here for follow up.    Addendum we finally got his labs after his visit    Labs from April 2, 2021 show A1c is 7.1%  Labs from 10/26/2020 HbA1c was 7.7%      He was previously seen by the PA  He has has a history of congestive heart failure with a non-preserved EF.  He also has P. Vera and CAD  He was previously taking Jardiance and Trulicity but cannot afford these medications he was also on Tresiba.  He has a history of intolerance of Metformin.        Last visit I restarted him on Tresiba and Farxiga because his diabetes was out of control  He is now on Tresiba 14u daily and he stopped taking Farxiga due to diarrhea.    He reports less hyperglycemia  He reports that he needs samples of medications because he has problems with affordability of these drugs  He hit the post on his bed and stubbed 3 of his left toes and injured the tips  However he is declining referral to wound care services      He has a history of hyperlipidemia with an LDL cholesterol of 94 on August 6, 2019   He cannot tolerate statins but is on Welchol  He is on an ACE inhibitor    Labs from January 2021 showed the following:  His LDL cholesterol is 90 with triglycerides 110,  Urine microalbumin creatinine ratio is 82  C-peptide is 3.1  TSH is normal at 1.5        Incidentally he was also diagnosed with a pancreatic cyst on his most recent hospital admission.  He has a 1.6 cm pancreatic tail cyst that was incidentally discovered on chest CTA to rule out pulmonary embolism        BG Diary:  Patient did not bring glucose meter despite repeated request to do so      Weight has been stable    Diabetes Complications   Retinopathy: No known retinopathy.  Last eye exam: He reports that he had an eye exam last year but we do not have those records  Neuropathy: Denies paresthesias or numbness in  hands or feet. Denies any foot wounds.  Exercise: Minimal.  Diet: Fair.  Patient's medications, allergies, and social histories were reviewed and updated as appropriate.    ROS:     CONS:     No fever, no chills   EYES:     No diplopia, no blurry vision   CV:           No chest pain, no palpitations   PULM:     No SOB, no cough, no hemoptysis.   GI:            No nausea, no vomiting, no diarrhea, no constipation   ENDO:     No polyuria, no polydipsia, no heat intolerance, no cold intolerance       Past Medical History:  Problem List:  2021-01: Community acquired pneumonia  2020-11: Drug-induced neutropenia (HCC)  2020-10: Insomnia due to medical condition  2020-10: Pyelonephritis with bacteremia   2020-10: Pericardial effusion  2020-09: QT prolongation  2020-09: Urethral irritation  2020-09: Medication side effect  2020-08: Nephrolithiasis  2020-08: Urinary retention  2020-08: History of TIA (transient ischemic attack)  2020-08: Advanced care planning/counseling discussion  2020-08: Constipation  2020-08: Age-related physical debility  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  2020-07: SLE (systemic lupus erythematosus related syndrome) ([facility])  2020-07: Pancreatic cyst  2020-07: Hyperglycemia  2020-07: Lip swelling  2020-07: Medication intolerance  2020-07: Generalized pain  2020-07: Elevated brain natriuretic peptide (BNP) level  2019-11: Lupus ([facility])  2019-05: Encounter for long-term (current) use of insulin ([facility])  2019-04: Indigestion  2019-01: Fatigue  2019-01: Shortness of breath  2019-01: Pain in the chest  2019-01: Erythromelalgia ([facility])  2019-01: Polycythemia vera ([facility])  2018-10: Multiple joint pain  2018-08: Vertigo  2018-08: Elevated lactic acid level  2018-08: Nausea  2018-04: Elevated sed rate- R/O PMR  2018-04: New daily persistent headache- R/O PMR; trial steroids  2018-04: Nonintractable episodic headache  2018-03: Orthostatic hypotension  2018-03: Stenosis of right carotid artery-Right CEA  3/21/18  2018-03: Essential hypertension  2018-03: Hypercoagulable state ([facility])  2018-03: Chronic headache  2018-03: Nausea & vomiting  2018-03: Dizziness  2018-03: History of stroke- old right parietal/occipital  2018-03: Thrombocytopenia ([facility])  2018-03: Leukopenia  2018-03: Hypokalemia  2018-03: Carotid stenosis, 90% right carotid  2018-01: Chronic daily headache  2018-01: Obesity (BMI 30-39.9)  2017-09: Agent orange exposure  2017-09: Chronic pain syndrome  2017-09: Risk for falls  2017-08: Long term (current) use of anticoagulants [Z79.01]  2017-03: History of ST elevation myocardial infarction (STEMI)  2017-03: SIRS (systemic inflammatory response syndrome) ([facility])  2017-03: ARF (acute renal failure) ([facility])  2017-03: Leukocytosis  2017-03: Hyponatremia  2017-03: Coagulopathy ([facility])  2017-03: Chronic systolic heart failure ([facility])  2017-03: Type 2 diabetes mellitus with complication, without long-  term current use of insulin ([facility])  2017-03: CAD (coronary artery disease)  2017-03: Diabetes ([facility])  2017-03: Benign prostatic hyperplasia without lower urinary tract   symptoms  2017-03: Thrombocytosis ([facility])  2017-03: Dyslipidemia  2017-03: Paroxysmal A-fib ([facility])  2017-03: Statin intolerance  2017-03: MATTHIAS (acute kidney injury) ([facility])  2017-03: STEMI (ST elevation myocardial infarction) ([facility])  2016-12: Polycythemia  2016-12: A-fib ([facility])      Past Surgical History:  Past Surgical History:   Procedure Laterality Date   • SPLIT THICKNESS SKIN GRAFT N/A 8/23/2020    Procedure: APPLICATION, GRAFT, SKIN, SPLIT-THICKNESS;  Surgeon: Elisa Craig M.D.;  Location: SURGERY [facility];  Service: Plastics   • SKIN ABSCESS INCISION AND DRAINAGE Right 8/3/2020    Procedure: INCISION AND DRAINAGE - SCROTAL WOUND - WOUND VAC PLACEMENT;  Surgeon: Jaylen Hayes M.D.;  Location: SURGERY [facility];  Service: Urology   • PB EXPLORE SCROTUM Right 7/31/2020    Procedure: EXPLORATION, SCROTUM - FOR WASH OUT OF SCROTAL WOUND & WOUND  "VAC PLACEMENT;  Surgeon: Ferny Rosales M.D.;  Location: SURGERY [facility];  Service: Urology   • PB EXPLORE SCROTUM Right 7/29/2020    Procedure: EXPLORATION, SCROTUM - FOR I & D OF SCROTAL AND  GROIN WOUND;  Surgeon: Donta Viera M.D.;  Location: SURGERY [facility];  Service: Urology   • PB INCIS/DRAIN SCROTUM/TESTIS,EPIDIDYM Right 7/25/2020    Procedure: INCISION AND DRAINAGE, SCROTUM;  Surgeon: Nick Negro M.D.;  Location: SURGERY [facility];  Service: Urology   • TEMPORAL ARTERY BIOPSY Right 6/26/2018    Procedure: TEMPORAL ARTERY BIOPSY;  Surgeon: Rajendra Aguilar M.D.;  Location: SURGERY SAME DAY [facility];  Service: General   • CAROTID ENDARTERECTOMY Right 3/21/2018    Procedure: CAROTID ENDARTERECTOMY- W/EEG MONITORING;  Surgeon: Benny Morfin M.D.;  Location: SURGERY [facility];  Service: General   • APPENDECTOMY     • CATH PLACEMENT      right arm   • LAMINOTOMY      diskectomy; C4   • OTHER CARDIAC SURGERY      stents x 3        Allergies:  Doxycycline, Beta adrenergic blockers, Metformin, Simvastatin, and Statins [hmg-coa-r inhibitors]     Social History:  Social History     Tobacco Use   • Smoking status: Never Smoker   • Smokeless tobacco: Never Used   Substance Use Topics   • Alcohol use: No   • Drug use: No        Family History:   family history is not on file.      PHYSICAL EXAM:   OBJECTIVE:  Vital signs: /70 (BP Location: Left arm, Patient Position: Sitting, BP Cuff Size: Adult)   Pulse 74   Resp 14   Ht 1.905 m (6' 3\")   Wt 87.6 kg (193 lb 3.2 oz)   SpO2 97%   BMI 24.15 kg/m²   GENERAL: Well-developed, well-nourished in no apparent distress.   EYE:  No ocular asymmetry, PERRLA  HENT: Pink, moist mucous membranes.    NECK: No thyromegaly.   CARDIOVASCULAR:  No murmurs  LUNGS: Clear breath sounds  ABDOMEN: Soft, nontender   EXTREMITIES: No clubbing, cyanosis, or edema.   NEUROLOGICAL: No gross focal motor abnormalities   LYMPH: No " cervical adenopathy seen  SKIN: No rashes, lesions.       Labs:  Lab Results   Component Value Date/Time    HBA1C 7.1 (H) 04/02/2021 03:24 PM        Lab Results   Component Value Date/Time    WBC 5.3 01/20/2021 09:35 AM    RBC 4.78 01/20/2021 09:35 AM    HEMOGLOBIN 12.5 (L) 01/20/2021 09:35 AM    MCV 86.4 01/20/2021 09:35 AM    MCH 26.2 (L) 01/20/2021 09:35 AM    MCHC 30.3 (L) 01/20/2021 09:35 AM    RDW 50.2 (H) 01/20/2021 09:35 AM    MPV 12.2 01/02/2021 03:56 AM       Lab Results   Component Value Date/Time    SODIUM 137 04/02/2021 03:24 PM    POTASSIUM 3.9 04/02/2021 03:24 PM    CHLORIDE 101 04/02/2021 03:24 PM    CO2 26 04/02/2021 03:24 PM    ANION 10.0 04/02/2021 03:24 PM    GLUCOSE 102 (H) 04/02/2021 03:24 PM    BUN 16 04/02/2021 03:24 PM    CREATININE 0.89 04/02/2021 03:24 PM    CALCIUM 9.3 04/02/2021 03:24 PM    ASTSGOT 22 04/02/2021 03:24 PM    ALTSGPT 13 04/02/2021 03:24 PM    TBILIRUBIN 0.6 04/02/2021 03:24 PM    ALBUMIN 3.5 04/02/2021 03:24 PM    TOTPROTEIN 8.2 04/02/2021 03:24 PM    GLOBULIN 4.7 (H) 04/02/2021 03:24 PM    AGRATIO 0.7 04/02/2021 03:24 PM       Lab Results   Component Value Date/Time    CHOLSTRLTOT 159 08/06/2019 0803    TRIGLYCERIDE 99 08/06/2019 0803    HDL 45 08/06/2019 0803    LDL 94 08/06/2019 0803       Lab Results   Component Value Date/Time    MALBCRT 82 (H) 01/20/2021 09:31 AM    MICROALBUR 5.4 01/20/2021 09:31 AM        Lab Results   Component Value Date/Time    TSHULTRASEN 0.459 10/02/2020 0420     No results found for: FREEDIR  No results found for: FREET3  No results found for: THYSTIMIG        ASSESSMENT/PLAN:     1. Type 2 diabetes mellitus with complication, without long-term current use of insulin (HCC)  Improved control A1c is down to 7.1%  Continue Tresiba 14 units daily  I recommend that he get a copy of his eye exam  I recommend that he bring his glucose meter    We will plan for follow-up in 3 months with repeat of his A1c and point-of-care test in the office    2.  Dyslipidemia   Fair control  Patient does not want to start atorvastatin continue WelChol repeat fasting lipids in 6 to 12 months    3. Pancreatic cyst  Stable recommend follow-up with his primary care physician for pancreatic cyst    4. Encounter for long-term (current) use of insulin (HCC)  Patient is on long-term insulin therapy for type 2 diabetes      Return in about 3 months (around 7/6/2021).      Thank you kindly for allowing me to participate in the diabetes care plan for this patient.    Gamaliel Stephens MD, [facility], [facility]  01/04/21    CC:   ALVINO Harman

## 2021-04-07 ENCOUNTER — PATIENT MESSAGE (OUTPATIENT)
Dept: MEDICAL GROUP | Facility: MEDICAL CENTER | Age: 71
End: 2021-04-07

## 2021-04-08 NOTE — TELEPHONE ENCOUNTER
From: Francesco Schulte  To: Nurse Practitioner Nereyda Moore  Sent: 4/7/2021 6:19 PM PDT  Subject: Procedure Question    I am having lab test done roughly quarterly by four different doctors is there any way in the [employer] system that I can get that coordinated so I only go one time and have the 12 vials of blood the results. then it goes out to each of them. One standing order suggestions?

## 2021-04-08 NOTE — PROGRESS NOTES
Chief Complaint   Patient presents with   • CHF (Systolic)   • Atrial Fibrillation   • MI (Non ST Segment Elevation MI)     F/V Dx: STEMI (ST elevation myocardial infarction) (HCC)     This evaluation was conducted via Zoom using secure and encrypted videoconferencing technology. The patient was in a private location in the state [facility].    The patient's identity was confirmed and verbal consent was obtained for this virtual visit.      Subjective:   Bob Schulte is a 70 y.o. male patient of Dr. Liliana Song who presents today for hospital follow up.    Patient was last seen by Dr. Liliana Song on 1/26/2021. His metoprolol was temporarily held due to ongoing diarrhea and patients concern it was cause by the BB. The diarrhea continued, patient was advised to resume his BB and discuss his diarrhea with his PCP.     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      Today patient states he his primary concern is his blood pressure. States that it is good in the morning 120's-130's but by evening time it is elevated running in the 160's-170's. This elevated BP is associated with a minor sensation of chest pressure, SOB, headache and some mental fogginess. Patient is currently on ly taking his metoprolol and digoxin at night. States he stopped taking the lisinopril and aldactone because there was no more refills.     Denies edema, SOB, palpitations, dizziness or syncope.     Past Medical History:   Diagnosis Date   • Anesthesia     resistant to pain meds   • Cancer (HCC)     polycythemia vera   • Diabetes (HCC)      insulin and oral meds   • Family history of punctured lung 2002    left lung   • Heart attack (HCC) 03/2017   • Hemorrhagic disorder (HCC)     on blood thinners   • High cholesterol    • Ischemic cardiomyopathy    • Kidney stones     hx of kidney stones   • Orthostatic hypotension 3/29/2018   • Pain     headache pain   • Polycythemia vera(238.4)    • Stroke (HCC) 2016    r/t afib; eye issues resolved afterward   • Urinary bladder disorder     enlarged prostate, weak stream, difficulty urinating   • Vertigo      Past Surgical History:   Procedure Laterality Date   • SPLIT THICKNESS SKIN GRAFT N/A 8/23/2020    Procedure: APPLICATION, GRAFT, SKIN, SPLIT-THICKNESS;  Surgeon: Elisa Craig M.D.;  Location: [facility];  Service: Plastics   • SKIN ABSCESS INCISION AND DRAINAGE Right 8/3/2020    Procedure: INCISION AND DRAINAGE - SCROTAL WOUND - WOUND VAC PLACEMENT;  Surgeon: Jaylen Hayes M.D.;  Location: [facility];  Service: Urology   • PB EXPLORE SCROTUM Right 7/31/2020    Procedure: EXPLORATION, SCROTUM - FOR WASH OUT OF SCROTAL WOUND & WOUND VAC PLACEMENT;  Surgeon: Ferny Rosales M.D.;  Location: [facility];  Service: Urology   • PB EXPLORE SCROTUM Right 7/29/2020    Procedure: EXPLORATION, SCROTUM - FOR I & D OF SCROTAL AND  GROIN WOUND;  Surgeon: Donta Viera M.D.;  Location: [facility];  Service: Urology   • PB INCIS/DRAIN SCROTUM/TESTIS,EPIDIDYM Right 7/25/2020    Procedure: INCISION AND DRAINAGE, SCROTUM;  Surgeon: Nick Negro M.D.;  Location: [facility];  Service: Urology   • TEMPORAL ARTERY BIOPSY Right 6/26/2018    Procedure: TEMPORAL ARTERY BIOPSY;  Surgeon: Rajendra Aguilar M.D.;  Location: SURGERY SAME DAY [facility];  Service: General   • CAROTID ENDARTERECTOMY Right 3/21/2018    Procedure: CAROTID ENDARTERECTOMY- W/EEG MONITORING;  Surgeon: Benny Morfin M.D.;  Location: [facility];  Service:  "General   • APPENDECTOMY     • CATH PLACEMENT      right arm   • LAMINOTOMY      diskectomy; C4   • OTHER CARDIAC SURGERY      stents x 3     No family history on file.  Social History     Socioeconomic History   • Marital status:      Spouse name: Not on file   • Number of children: Not on file   • Years of education: Not on file   • Highest education level: Not on file   Occupational History   • Not on file   Tobacco Use   • Smoking status: Never Smoker   • Smokeless tobacco: Never Used   Substance and Sexual Activity   • Alcohol use: No   • Drug use: No   • Sexual activity: Not on file   Other Topics Concern   • Not on file   Social History Narrative   • Not on file     Social Determinants of Health     Financial Resource Strain:    • Difficulty of Paying Living Expenses:    Food Insecurity:    • Worried About Running Out of Food in the Last Year:    • Ran Out of Food in the Last Year:    Transportation Needs:    • Lack of Transportation (Medical):    • Lack of Transportation (Non-Medical):    Physical Activity:    • Days of Exercise per Week:    • Minutes of Exercise per Session:    Stress:    • Feeling of Stress :    Social Connections:    • Frequency of Communication with Friends and Family:    • Frequency of Social Gatherings with Friends and Family:    • Attends Shinto Services:    • Active Member of Clubs or Organizations:    • Attends Club or Organization Meetings:    • Marital Status:    Intimate Partner Violence:    • Fear of Current or Ex-Partner:    • Emotionally Abused:    • Physically Abused:    • Sexually Abused:      Allergies   Allergen Reactions   • Doxycycline      Swelling lips and difficulties swallowing   • Beta Adrenergic Blockers Unspecified     dizziness   • Metformin Unspecified and Palpitations     Cardiac effects  \"Similar to a heart attack, I was hospitalized\"   • Simvastatin      Other reaction(s): Other (Specify with Comments)  Myalgias  Myalgias   • Statins [Hmg-Coa-R " Inhibitors]      Muscle ache, joint pain     Outpatient Encounter Medications as of 4/13/2021   Medication Sig Dispense Refill   • spironolactone (ALDACTONE) 25 MG Tab Take 1 tablet by mouth every day. 90 tablet 3   • lisinopril (PRINIVIL) 5 MG Tab Take 1 tablet by mouth every day. 90 tablet 3   • digoxin (LANOXIN) 125 MCG Tab Take 1 tablet by mouth every day at 6 PM. 90 tablet 3   • metoprolol SR (TOPROL XL) 25 MG TABLET SR 24 HR Take 1 tablet by mouth every evening. 90 tablet 3   • ONETOUCH VERIO strip VERIO TEST STRIPS FOR GLUCOSE MONITORING 3 TIMES A DAY AND AS NEEDED *INS COVERS 100 PER 90 DAY     • Blood Glucose Monitoring Suppl (ONETOUCH VERIO) w/Device Kit 1 Device 3 times a day before meals. 1 Kit 1   • finasteride (PROSCAR) 5 MG Tab TAKE 1 TABLET BY MOUTH EVERY DAY 90 tablet 1   • ELIQUIS 5 MG Tab TAKE 1 TABLET BY MOUTH TWICE A  tablet 0   • colesevelam (WELCHOL) 625 MG Tab Take 3 Tabs by mouth 2 times a day, with meals. 180 Tab 11   • Insulin Degludec (TRESIBA FLEXTOUCH) 200 UNIT/ML Solution Pen-injector Inject 12 Units under the skin every evening. 3 mL 11   • Blood Glucose Test Strips verio test strips for glucose monitoring TID and  Each 5   • hydroxyurea (HYDREA) 500 MG Cap Take 500 mg by mouth 2 Times a Day.     • tamsulosin (FLOMAX) 0.4 MG capsule Take 2 Caps by mouth every day. (Patient taking differently: Take 0.4 mg by mouth every day.) 60 Cap 6   • naproxen (NAPROSYN) 250 MG Tab Take 500-750 mg by mouth 2 times a day as needed. Indications: Pain     • aspirin EC (ECOTRIN) 81 MG Tablet Delayed Response Take 1 Tab by mouth every day. 30 Tab    • therapeutic multivitamin-minerals (THERAGRAN-M) Tab Take 1 Tab by mouth every day.     • [DISCONTINUED] metoprolol SR (TOPROL XL) 25 MG TABLET SR 24 HR Take 1 tablet by mouth every evening. (Patient not taking: Reported on 4/13/2021) 90 tablet 1   • [DISCONTINUED] spironolactone (ALDACTONE) 25 MG Tab Take 1 Tab by mouth every day. (Patient  "not taking: Reported on 4/13/2021) 90 Tab 3   • [DISCONTINUED] digoxin (LANOXIN) 125 MCG Tab Take 1 Tab by mouth every day at 6 PM. (Patient not taking: Reported on 4/13/2021) 30 Tab 3   • [DISCONTINUED] lisinopril (PRINIVIL) 2.5 MG Tab Take 2 Tabs by mouth every day. (Patient not taking: Reported on 4/13/2021) 180 Tab 3     No facility-administered encounter medications on file as of 4/13/2021.     Review of Systems   Constitutional: Negative for malaise/fatigue and weight loss.   Respiratory: Negative for shortness of breath.    Cardiovascular: Negative for chest pain, palpitations, orthopnea, claudication, leg swelling and PND.        Minor chest pressure with elevated BP   Neurological: Positive for headaches (With elevated BP). Negative for dizziness and weakness.        Mental fogginess when BP is elevated.    All other systems reviewed and are negative.       Objective:   BP (!) 165/100 (BP Location: Left arm, Patient Position: Sitting, BP Cuff Size: Adult)   Pulse 80   Ht 1.905 m (6' 3\")   Wt 83.9 kg (185 lb)   BMI 23.12 kg/m²     Physical Exam  Deferred as visit was completed virtually.      Holter 10-9-19  Normal sinus rhythm   Occasional premature ventricular contractions (approximately 3.4% of total   beats)   No sustained arrhythmias noted      Echocardiogram:  8-  CONCLUSIONS  Prior echo 7/1/20, no significant changes are noted.  Left ventricular ejection fraction is visually estimated to be 30%.  Unable to estimate pulmonary artery pressure due to an inadequate tricuspid regurgitant jet.     Cardiac Catheterization:   DATE OF SERVICE:  03/11/2017     PROCEDURE:  Cardiac catheterization and Percutaneous Coronary Intervention.  A.  Left heart catheterization.  B.  Left ventriculography.  C.  Selective coronary angiography.  D.  Coronary aspiration thrombectomy of the left anterior descending artery.  E.  Coronary stent implantation of the proximal left anterior descending artery with a 4.0x8 " mm drug-eluting Alpine Xience stent for in-stent restenosis.  F.  Coronary stent implantation of the mid left anterior descending artery with a 2.5x38 mm drug-eluting Xience Alpine stent post-dilated to 3.0 mm.  G.  Right radial artery approach.     PREOPERATIVE DIAGNOSES:  1.  ST elevation myocardial infarction, anterior.  2.  Coronary artery disease with previous myocardial infarction and coronary   intervention in 2008 and 2010.  3.  Paroxysmal atrial fibrillation.  4.  Previous stroke, December 2016.  5.  Hyperlipidemia.  6.  Polycythemia rubra vera with thrombocytopenia.  7.  Diabetes mellitus.     POSTPROCEDURE DIAGNOSES:  1.  Subacute stent thrombosis.  2.  In-stent restenosis of the proximal left anterior descending artery stent.  3.  Severe diffuse obstructive coronary artery disease of the mid left anterior descending artery.     Stress Test: 1/6/2018  NUCLEAR IMAGING INTERPRETATION  Severe left ventriular dilation at rest.  No additional dilation noted with stress.  Chronic left bundle branch block at rest.  Extensive, large prior inferior infarct.  No evidence of ischemia.  Global hypokinesis with resting LVEF 20-30%.  ECG INTERPRETATION  Nondiagnostic.    Echocardiogram 12/3/2020:     CONCLUSIONS  Limited for evaluation of pericardial effusion.  Severely reduced left ventricular systolic function, EF estimated to be   20%.    Global hypokinesis with akinesis of the mid to apical LAD distribution.    Trivial anterior pericardial effusion without evidence of hemodynamic   compromise.     Compared to the images of the prior study done on 10-, effusion is smaller, EF appears further reduced.    Echocardiogram 1/2/2021:     CONCLUSIONS  Prior echo 12/3/20, no significant changes  Severely reduced left ventricular systolic function.  Left ventricular ejection fraction is visually estimated to be 20%.  Normal right ventricular size and systolic function.     Medical Decision Making:  Today's Assessment  "/ Status / Plan:     Ischemic CM (EF 20%), NYHA Classs I-II:  -Volume status remains stable per patient report.   -Continue Toprol XL 25 mg daily and digoxin 125 mcg daily.   -Re-start lisinopril 5 mg daily and Aldactone 25 mg daily.   -Patient is not interested in ICD or life vest for primary prevention at this time, would like to try 90 days of GDMT first. Discussed the importance of consistent use of GDMT for 90 days, we will then repeat TTE to reassess.   -Currently not needed diuretics.   -Reinforced s/sx of worsening heart failure with patient and weight monitoring. Pt verbalizes understanding. Pt to call office or RTC if present.     CAD:  -S/P STEMI with multiple PCI (LAD, Lcs and POBA to the digonal) in setting of severe polycythemia.   -Denies Chest pain.  -Continue ASA 81 mg daily.     HTN:  -Re-stared Lisinopril and Aldactone as above.  -BMP in 10-14 days, order mailed out to patients home.     Carotid Artery Disease:  -S/P Right CEA in 2018.    HLD:  -LDL in 2019 was 94.  -Intolerant to statins, believes he has tried Zetia in the past.  -Open to discuss other options. Referral placed to lipid clinic to discuss PCSK9 inhibitor therapy.     Pericardial Effusion:  -\"Trivial\" in size per repeat TTE on 12/3/2020.     PAF:  -No known recurrence.   -OAC with Eliquis 5 mg BID.   -Continue BB as above.     SVT:  -Stable.   -Continue BB as above.     Hypotension:  -Remains stable off of Midodrine.      Patient will follow up with myself as scheduled below or earlier if needed. Encouraged patient to contact our office should any questions or concerns arise in the mean time.       Future Appointments   Date Time Provider Department Center   5/11/2021  7:45 AM CONNER Cano. [facility] None   7/14/2021  8:50 AM Gamaliel Stephens M.D. [facility]         "

## 2021-04-13 ENCOUNTER — TELEPHONE (OUTPATIENT)
Dept: CARDIOLOGY | Facility: MEDICAL CENTER | Age: 71
End: 2021-04-13

## 2021-04-13 ENCOUNTER — TELEMEDICINE (OUTPATIENT)
Dept: CARDIOLOGY | Facility: MEDICAL CENTER | Age: 71
End: 2021-04-13
Payer: MEDICARE

## 2021-04-13 VITALS
HEIGHT: 75 IN | SYSTOLIC BLOOD PRESSURE: 165 MMHG | DIASTOLIC BLOOD PRESSURE: 100 MMHG | HEART RATE: 80 BPM | BODY MASS INDEX: 23 KG/M2 | WEIGHT: 185 LBS

## 2021-04-13 DIAGNOSIS — I50.22 CHRONIC SYSTOLIC HEART FAILURE (HCC): ICD-10-CM

## 2021-04-13 DIAGNOSIS — I25.10 CORONARY ARTERY DISEASE INVOLVING NATIVE CORONARY ARTERY OF NATIVE HEART WITHOUT ANGINA PECTORIS: ICD-10-CM

## 2021-04-13 DIAGNOSIS — I48.0 PAF (PAROXYSMAL ATRIAL FIBRILLATION) (HCC): ICD-10-CM

## 2021-04-13 DIAGNOSIS — I47.10 SVT (SUPRAVENTRICULAR TACHYCARDIA) (HCC): ICD-10-CM

## 2021-04-13 DIAGNOSIS — E78.5 DYSLIPIDEMIA: ICD-10-CM

## 2021-04-13 DIAGNOSIS — Z78.9 STATIN INTOLERANCE: ICD-10-CM

## 2021-04-13 DIAGNOSIS — I65.21 STENOSIS OF RIGHT CAROTID ARTERY: ICD-10-CM

## 2021-04-13 PROCEDURE — 99214 OFFICE O/P EST MOD 30 MIN: CPT | Mod: 95,CR | Performed by: NURSE PRACTITIONER

## 2021-04-13 RX ORDER — DIGOXIN 125 MCG
125 TABLET ORAL DAILY
Qty: 90 TABLET | Refills: 3 | Status: SHIPPED | OUTPATIENT
Start: 2021-04-13 | End: 2022-05-31

## 2021-04-13 RX ORDER — SPIRONOLACTONE 25 MG/1
25 TABLET ORAL DAILY
Qty: 90 TABLET | Refills: 3 | Status: SHIPPED | OUTPATIENT
Start: 2021-04-13 | End: 2021-09-02

## 2021-04-13 RX ORDER — LISINOPRIL 5 MG/1
5 TABLET ORAL DAILY
Qty: 90 TABLET | Refills: 3 | Status: SHIPPED | OUTPATIENT
Start: 2021-04-13 | End: 2021-05-20

## 2021-04-13 RX ORDER — METOPROLOL SUCCINATE 25 MG/1
25 TABLET, EXTENDED RELEASE ORAL EVERY EVENING
Qty: 90 TABLET | Refills: 3 | Status: SHIPPED | OUTPATIENT
Start: 2021-04-13 | End: 2022-01-10 | Stop reason: SDUPTHER

## 2021-04-13 ASSESSMENT — FIBROSIS 4 INDEX
FIB4 SCORE: 2.94
FIB4 SCORE: 2.94

## 2021-04-14 ASSESSMENT — ENCOUNTER SYMPTOMS
PALPITATIONS: 0
CLAUDICATION: 0
ORTHOPNEA: 0
WEIGHT LOSS: 0
WEAKNESS: 0
SHORTNESS OF BREATH: 0
DIZZINESS: 0
PND: 0
HEADACHES: 1

## 2021-04-16 ENCOUNTER — PATIENT MESSAGE (OUTPATIENT)
Dept: MEDICAL GROUP | Facility: MEDICAL CENTER | Age: 71
End: 2021-04-16

## 2021-04-16 RX ORDER — FINASTERIDE 5 MG/1
5 TABLET, FILM COATED ORAL
Qty: 90 TABLET | Refills: 1 | Status: CANCELLED
Start: 2021-04-16

## 2021-04-27 ENCOUNTER — HOSPITAL ENCOUNTER (OUTPATIENT)
Dept: LAB | Facility: MEDICAL CENTER | Age: 71
End: 2021-04-27
Attending: NURSE PRACTITIONER
Payer: MEDICARE

## 2021-04-27 DIAGNOSIS — I50.22 CHRONIC SYSTOLIC HEART FAILURE (HCC): ICD-10-CM

## 2021-04-27 LAB
ANION GAP SERPL CALC-SCNC: 9 MMOL/L (ref 7–16)
BUN SERPL-MCNC: 19 MG/DL (ref 8–22)
CALCIUM SERPL-MCNC: 9.2 MG/DL (ref 8.4–10.2)
CHLORIDE SERPL-SCNC: 101 MMOL/L (ref 96–112)
CO2 SERPL-SCNC: 25 MMOL/L (ref 20–33)
CREAT SERPL-MCNC: 0.98 MG/DL (ref 0.5–1.4)
GLUCOSE SERPL-MCNC: 141 MG/DL (ref 65–99)
POTASSIUM SERPL-SCNC: 4.3 MMOL/L (ref 3.6–5.5)
SODIUM SERPL-SCNC: 135 MMOL/L (ref 135–145)

## 2021-04-27 PROCEDURE — 80048 BASIC METABOLIC PNL TOTAL CA: CPT

## 2021-04-27 PROCEDURE — 36415 COLL VENOUS BLD VENIPUNCTURE: CPT

## 2021-05-05 DIAGNOSIS — I48.0 PAF (PAROXYSMAL ATRIAL FIBRILLATION) (HCC): ICD-10-CM

## 2021-05-05 RX ORDER — APIXABAN 5 MG/1
TABLET, FILM COATED ORAL
Qty: 180 TABLET | Refills: 3 | Status: SHIPPED | OUTPATIENT
Start: 2021-05-05 | End: 2021-09-30 | Stop reason: SDUPTHER

## 2021-05-19 ENCOUNTER — TELEPHONE (OUTPATIENT)
Dept: CARDIOLOGY | Facility: MEDICAL CENTER | Age: 71
End: 2021-05-19

## 2021-05-19 NOTE — TELEPHONE ENCOUNTER
JS    Patient is concerned that we had to change their appt to a later date. They are having palps and chest pain at times. Pt is wanting to stop taking their medications until this all get resolved. Pt can be reached at [phone].    Thank you,  Luz Elena APARICIO

## 2021-05-19 NOTE — TELEPHONE ENCOUNTER
Not sure why pt's appt was rescheduled to a later date, but [facility] had an opening tomorrow 5/20 at 1:15 pm, scheduled pt there for intermittent chest pain. Pt feels his medication regimen needs to be discussed asap as well. Reviewed ED precautions.

## 2021-05-20 ENCOUNTER — OFFICE VISIT (OUTPATIENT)
Dept: CARDIOLOGY | Facility: MEDICAL CENTER | Age: 71
End: 2021-05-20
Payer: MEDICARE

## 2021-05-20 VITALS
HEIGHT: 75 IN | OXYGEN SATURATION: 98 % | HEART RATE: 79 BPM | WEIGHT: 190 LBS | BODY MASS INDEX: 23.62 KG/M2 | DIASTOLIC BLOOD PRESSURE: 82 MMHG | RESPIRATION RATE: 14 BRPM | SYSTOLIC BLOOD PRESSURE: 130 MMHG

## 2021-05-20 DIAGNOSIS — I48.91 ATRIAL FIBRILLATION, UNSPECIFIED TYPE (HCC): ICD-10-CM

## 2021-05-20 DIAGNOSIS — I20.89 ANGINA AT REST (HCC): ICD-10-CM

## 2021-05-20 DIAGNOSIS — I47.10 SVT (SUPRAVENTRICULAR TACHYCARDIA) (HCC): ICD-10-CM

## 2021-05-20 DIAGNOSIS — I48.0 PAF (PAROXYSMAL ATRIAL FIBRILLATION) (HCC): ICD-10-CM

## 2021-05-20 DIAGNOSIS — D75.1 POLYCYTHEMIA: ICD-10-CM

## 2021-05-20 DIAGNOSIS — I25.5 ISCHEMIC CARDIOMYOPATHY: ICD-10-CM

## 2021-05-20 DIAGNOSIS — I25.10 CORONARY ARTERY DISEASE INVOLVING NATIVE CORONARY ARTERY OF NATIVE HEART WITHOUT ANGINA PECTORIS: ICD-10-CM

## 2021-05-20 DIAGNOSIS — I48.0 PAROXYSMAL A-FIB (HCC): ICD-10-CM

## 2021-05-20 DIAGNOSIS — E78.5 DYSLIPIDEMIA: ICD-10-CM

## 2021-05-20 DIAGNOSIS — Z78.9 STATIN INTOLERANCE: ICD-10-CM

## 2021-05-20 LAB — EKG IMPRESSION: NORMAL

## 2021-05-20 PROCEDURE — 93000 ELECTROCARDIOGRAM COMPLETE: CPT | Performed by: INTERNAL MEDICINE

## 2021-05-20 PROCEDURE — 99214 OFFICE O/P EST MOD 30 MIN: CPT | Performed by: NURSE PRACTITIONER

## 2021-05-20 ASSESSMENT — FIBROSIS 4 INDEX: FIB4 SCORE: 2.94

## 2021-05-20 ASSESSMENT — ENCOUNTER SYMPTOMS
CLAUDICATION: 0
WEIGHT LOSS: 0
PALPITATIONS: 0
SHORTNESS OF BREATH: 0
PND: 0
WEAKNESS: 0
DIZZINESS: 0
ORTHOPNEA: 0

## 2021-05-20 NOTE — PROGRESS NOTES
"Chief Complaint   Patient presents with   • Congestive Heart Failure     F/V Dx: Chronic systolic heart failure (HCC)   • Atrial Fibrillation     F/V Dx: Paroxysmal A-fib (HCC)   • Chest Pain         Subjective:   Bob Schulte is a 70 y.o. male patient of Dr. Liliana Song who presents today for hospital follow up.    Patient was last seen by myself on 4/13/2021. He had some concern about elevated BP in the evening hours, he had stopped taking his lisinopril and aldactone as there was no more refills. His lisinopril and Aldactone was restarted.    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      Today patient states that he has been having some relatively mild left sided chest aching at night when he is resting or sleeping and he will take a few dep breaths and it will go away. This has happened approximately 4 times in the last month. Denies having any chest discomfort with exertion.    Patient was unable to tolerate the lisinopril as it caused him to feel dehydrated and \"spacey\". Denies having any significant issues with edema or MÉNDEZ.     Patient is also wanting to know if he can stop his Eliquis as the cost has gone from $45/mo to $75/mo.     Past Medical History:   Diagnosis Date   • Anesthesia     resistant to pain meds   • Cancer (HCC)     polycythemia vera   • Diabetes (HCC)     insulin and oral meds   • Family history of punctured lung 2002    left lung   • Heart attack (HCC) 03/2017   • Hemorrhagic disorder (HCC)     on blood thinners   • High cholesterol    • Ischemic cardiomyopathy    • Kidney stones  "    hx of kidney stones   • Orthostatic hypotension 3/29/2018   • Pain     headache pain   • Polycythemia vera(238.4)    • Stroke (HCC) 2016    r/t afib; eye issues resolved afterward   • Urinary bladder disorder     enlarged prostate, weak stream, difficulty urinating   • Vertigo      Past Surgical History:   Procedure Laterality Date   • SPLIT THICKNESS SKIN GRAFT N/A 8/23/2020    Procedure: APPLICATION, GRAFT, SKIN, SPLIT-THICKNESS;  Surgeon: Elisa Craig M.D.;  Location: [facility];  Service: Plastics   • SKIN ABSCESS INCISION AND DRAINAGE Right 8/3/2020    Procedure: INCISION AND DRAINAGE - SCROTAL WOUND - WOUND VAC PLACEMENT;  Surgeon: Jaylen Hayes M.D.;  Location: [facility];  Service: Urology   • PB EXPLORE SCROTUM Right 7/31/2020    Procedure: EXPLORATION, SCROTUM - FOR WASH OUT OF SCROTAL WOUND & WOUND VAC PLACEMENT;  Surgeon: Ferny Rosales M.D.;  Location: [facility];  Service: Urology   • PB EXPLORE SCROTUM Right 7/29/2020    Procedure: EXPLORATION, SCROTUM - FOR I & D OF SCROTAL AND  GROIN WOUND;  Surgeon: Donta Viera M.D.;  Location: [facility];  Service: Urology   • PB INCIS/DRAIN SCROTUM/TESTIS,EPIDIDYM Right 7/25/2020    Procedure: INCISION AND DRAINAGE, SCROTUM;  Surgeon: Nick Negro M.D.;  Location: [facility];  Service: Urology   • TEMPORAL ARTERY BIOPSY Right 6/26/2018    Procedure: TEMPORAL ARTERY BIOPSY;  Surgeon: Rajendra Aguilar M.D.;  Location: SURGERY SAME DAY [facility];  Service: General   • CAROTID ENDARTERECTOMY Right 3/21/2018    Procedure: CAROTID ENDARTERECTOMY- W/EEG MONITORING;  Surgeon: Benny Morfin M.D.;  Location: [facility];  Service: General   • APPENDECTOMY     • CATH PLACEMENT      right arm   • LAMINOTOMY      diskectomy; C4   • OTHER CARDIAC SURGERY      stents x 3     History reviewed. No pertinent family history.  Social History     Socioeconomic History  "  • Marital status:      Spouse name: Not on file   • Number of children: Not on file   • Years of education: Not on file   • Highest education level: Not on file   Occupational History   • Not on file   Tobacco Use   • Smoking status: Never Smoker   • Smokeless tobacco: Never Used   Vaping Use   • Vaping Use: Never used   Substance and Sexual Activity   • Alcohol use: No   • Drug use: No   • Sexual activity: Not on file   Other Topics Concern   • Not on file   Social History Narrative   • Not on file     Social Determinants of Health     Financial Resource Strain:    • Difficulty of Paying Living Expenses:    Food Insecurity:    • Worried About Running Out of Food in the Last Year:    • Ran Out of Food in the Last Year:    Transportation Needs:    • Lack of Transportation (Medical):    • Lack of Transportation (Non-Medical):    Physical Activity:    • Days of Exercise per Week:    • Minutes of Exercise per Session:    Stress:    • Feeling of Stress :    Social Connections:    • Frequency of Communication with Friends and Family:    • Frequency of Social Gatherings with Friends and Family:    • Attends Rastafarian Services:    • Active Member of Clubs or Organizations:    • Attends Club or Organization Meetings:    • Marital Status:    Intimate Partner Violence:    • Fear of Current or Ex-Partner:    • Emotionally Abused:    • Physically Abused:    • Sexually Abused:      Allergies   Allergen Reactions   • Doxycycline      Swelling lips and difficulties swallowing   • Beta Adrenergic Blockers Unspecified     dizziness   • Metformin Unspecified and Palpitations     Cardiac effects  \"Similar to a heart attack, I was hospitalized\"   • Simvastatin      Other reaction(s): Other (Specify with Comments)  Myalgias  Myalgias   • Statins [Hmg-Coa-R Inhibitors]      Muscle ache, joint pain     Outpatient Encounter Medications as of 5/20/2021   Medication Sig Dispense Refill   • ELIQUIS 5 MG Tab TAKE 1 TABLET BY MOUTH " TWICE A  tablet 3   • spironolactone (ALDACTONE) 25 MG Tab Take 1 tablet by mouth every day. 90 tablet 3   • digoxin (LANOXIN) 125 MCG Tab Take 1 tablet by mouth every day at 6 PM. 90 tablet 3   • metoprolol SR (TOPROL XL) 25 MG TABLET SR 24 HR Take 1 tablet by mouth every evening. 90 tablet 3   • ONETOUCH VERIO strip VERIO TEST STRIPS FOR GLUCOSE MONITORING 3 TIMES A DAY AND AS NEEDED *INS COVERS 100 PER 90 DAY     • Blood Glucose Monitoring Suppl (ONETOUCH VERIO) w/Device Kit 1 Device 3 times a day before meals. 1 Kit 1   • finasteride (PROSCAR) 5 MG Tab TAKE 1 TABLET BY MOUTH EVERY DAY 90 tablet 1   • colesevelam (WELCHOL) 625 MG Tab Take 3 Tabs by mouth 2 times a day, with meals. 180 Tab 11   • Insulin Degludec (TRESIBA FLEXTOUCH) 200 UNIT/ML Solution Pen-injector Inject 12 Units under the skin every evening. 3 mL 11   • Blood Glucose Test Strips verio test strips for glucose monitoring TID and  Each 5   • hydroxyurea (HYDREA) 500 MG Cap Take 500 mg by mouth 2 Times a Day.     • tamsulosin (FLOMAX) 0.4 MG capsule Take 2 Caps by mouth every day. (Patient taking differently: Take 0.4 mg by mouth every day.) 60 Cap 6   • naproxen (NAPROSYN) 250 MG Tab Take 500-750 mg by mouth 2 times a day as needed. Indications: Pain     • aspirin EC (ECOTRIN) 81 MG Tablet Delayed Response Take 1 Tab by mouth every day. 30 Tab    • therapeutic multivitamin-minerals (THERAGRAN-M) Tab Take 1 Tab by mouth every day.     • [DISCONTINUED] lisinopril (PRINIVIL) 5 MG Tab Take 1 tablet by mouth every day. (Patient not taking: Reported on 5/20/2021) 90 tablet 3     No facility-administered encounter medications on file as of 5/20/2021.     Review of Systems   Constitutional: Negative for malaise/fatigue and weight loss.   Respiratory: Negative for shortness of breath.    Cardiovascular: Positive for chest pain. Negative for palpitations, orthopnea, claudication, leg swelling and PND.   Neurological: Negative for dizziness and  "weakness.   All other systems reviewed and are negative.       Objective:   /82 (BP Location: Left arm, Patient Position: Sitting, BP Cuff Size: Adult)   Pulse 79   Resp 14   Ht 1.905 m (6' 3\")   Wt 86.2 kg (190 lb)   SpO2 98%   BMI 23.75 kg/m²     Physical Exam   Constitutional: He is oriented to person, place, and time. He appears well-developed. No distress.   HENT:   Head: Normocephalic.   Neck: No JVD present.   Cardiovascular: Normal rate and regular rhythm.   Pulmonary/Chest: Effort normal and breath sounds normal.   Musculoskeletal:      Right lower leg: No edema.      Left lower leg: No edema.   Neurological: He is alert and oriented to person, place, and time.   Skin: Skin is warm and dry.   Psychiatric: His behavior is normal.     Holter 10-9-19  Normal sinus rhythm   Occasional premature ventricular contractions (approximately 3.4% of total   beats)   No sustained arrhythmias noted      Echocardiogram:  8-  CONCLUSIONS  Prior echo 7/1/20, no significant changes are noted.  Left ventricular ejection fraction is visually estimated to be 30%.  Unable to estimate pulmonary artery pressure due to an inadequate tricuspid regurgitant jet.     Cardiac Catheterization:   DATE OF SERVICE:  03/11/2017     PROCEDURE:  Cardiac catheterization and Percutaneous Coronary Intervention.  A.  Left heart catheterization.  B.  Left ventriculography.  C.  Selective coronary angiography.  D.  Coronary aspiration thrombectomy of the left anterior descending artery.  E.  Coronary stent implantation of the proximal left anterior descending artery with a 4.0x8 mm drug-eluting Alpine Xience stent for in-stent restenosis.  F.  Coronary stent implantation of the mid left anterior descending artery with a 2.5x38 mm drug-eluting Xience Alpine stent post-dilated to 3.0 mm.  G.  Right radial artery approach.     PREOPERATIVE DIAGNOSES:  1.  ST elevation myocardial infarction, anterior.  2.  Coronary artery disease with " previous myocardial infarction and coronary   intervention in 2008 and 2010.  3.  Paroxysmal atrial fibrillation.  4.  Previous stroke, December 2016.  5.  Hyperlipidemia.  6.  Polycythemia rubra vera with thrombocytopenia.  7.  Diabetes mellitus.     POSTPROCEDURE DIAGNOSES:  1.  Subacute stent thrombosis.  2.  In-stent restenosis of the proximal left anterior descending artery stent.  3.  Severe diffuse obstructive coronary artery disease of the mid left anterior descending artery.     Stress Test: 1/6/2018  NUCLEAR IMAGING INTERPRETATION  Severe left ventriular dilation at rest.  No additional dilation noted with stress.  Chronic left bundle branch block at rest.  Extensive, large prior inferior infarct.  No evidence of ischemia.  Global hypokinesis with resting LVEF 20-30%.  ECG INTERPRETATION  Nondiagnostic.    Echocardiogram 12/3/2020:     CONCLUSIONS  Limited for evaluation of pericardial effusion.  Severely reduced left ventricular systolic function, EF estimated to be   20%.    Global hypokinesis with akinesis of the mid to apical LAD distribution.    Trivial anterior pericardial effusion without evidence of hemodynamic   compromise.     Compared to the images of the prior study done on 10-, effusion is smaller, EF appears further reduced.    Echocardiogram 1/2/2021:     CONCLUSIONS  Prior echo 12/3/20, no significant changes  Severely reduced left ventricular systolic function.  Left ventricular ejection fraction is visually estimated to be 20%.  Normal right ventricular size and systolic function.     Medical Decision Making:  Today's Assessment / Status / Plan:     Ischemic CM (EF 20%), NYHA Classs I-II:  -No evidence of acute heart failure off of diuretics.   -Continue Toprol XL 25 mg daily, digoxin 125 mcg daily and aldactone 25 mg daily.  -Offered to start losartan in place of lisinopril but patient refuses.   -Patient remains uninterested in ICD or life vest for primary prevention at this  "time.  -Repeat limited TTE ordered to re-evaluate LVEF.     CAD:  -S/P STEMI with multiple PCI (LAD, Lcs and POBA to the digonal) in setting of severe polycythemia.   -Having some atypical chest pain with some minor anterior ST changes.   -Will repeat NM cardiac stress test.   -Continue ASA 81 mg daily and Toprol XL as above.     HTN:  -Well controlled per patient, stable in office today.     Carotid Artery Disease:  -S/P Right CEA in 2018.    HLD:  -LDL in 2019 was 94.  -Intolerant to statins, believes he has tried Zetia in the past.  -Open to discuss other options.   -Referral placed to lipid clinic to discuss PCSK9 inhibitor therapy.     Pericardial Effusion:  -\"Trivial\" in size per repeat TTE on 12/3/2020.     PAF:  -First diagnosed in 2016.   -No known recurrence that I can find.   -OAC with Eliquis 5 mg BID, currently unaffordable. Patient refuses to go back on warfarin as he had GI upset while he was on it. It does not appear that Xarelto is reimbursable as well. I discussed with patient that I do recommend that he continues OAC at this time. I offered to place a 30 day monitor to assess his PAF burden and he has declined at this time. Patient is apparently getting medication assistance from some organization that he cannot remember the name of, I have asked patient to please go home and call us with the name and number of this organization and I will try to see if there is anything we can do to help with the cost of his Eliquis.   -Continue BB as above.     SVT:  -Stable.   -Continue BB as above.     Hypotension:  -Resolved.     Patient will follow up with myself as scheduled below or earlier if needed. Encouraged patient to contact our office should any questions or concerns arise in the mean time.     Future Appointments   Date Time Provider Department Center   6/4/2021  8:30 AM [facility] NM [facility]   7/8/2021  8:15 AM [facility] ECHO 1 DEL[facility]   7/14/2021  8:50 AM Gamaliel Stephens M.D. [facility] " [place]   8/5/2021  1:00 PM [facility]D. [facility] None

## 2021-05-23 ENCOUNTER — PATIENT MESSAGE (OUTPATIENT)
Dept: MEDICAL GROUP | Facility: MEDICAL CENTER | Age: 71
End: 2021-05-23

## 2021-05-24 NOTE — TELEPHONE ENCOUNTER
From: Francesco Schulte  To: Nurse Practitioner Nereyda Moore  Sent: 5/23/2021 5:38 PM PDT  Subject: Non-Urgent Medical Question    Evidently I am having some heart issues and they're putting me through some tests however I need to look at New Year's Day in a couple days after that with you because there's something that happened with the covid is what they suspect please schedule a short virtual thank you

## 2021-05-28 ENCOUNTER — APPOINTMENT (OUTPATIENT)
Dept: RADIOLOGY | Facility: MEDICAL CENTER | Age: 71
DRG: 391 | End: 2021-05-28
Attending: EMERGENCY MEDICINE
Payer: MEDICARE

## 2021-05-28 ENCOUNTER — HOSPITAL ENCOUNTER (INPATIENT)
Facility: MEDICAL CENTER | Age: 71
LOS: 4 days | DRG: 391 | End: 2021-06-03
Attending: EMERGENCY MEDICINE | Admitting: HOSPITALIST
Payer: MEDICARE

## 2021-05-28 DIAGNOSIS — R07.9 CHEST PAIN, UNSPECIFIED TYPE: ICD-10-CM

## 2021-05-28 DIAGNOSIS — R53.1 GENERALIZED WEAKNESS: ICD-10-CM

## 2021-05-28 PROBLEM — R10.9 AP (ABDOMINAL PAIN): Status: ACTIVE | Noted: 2021-05-28

## 2021-05-28 LAB
ALBUMIN SERPL BCP-MCNC: 3.7 G/DL (ref 3.2–4.9)
ALBUMIN/GLOB SERPL: 0.7 G/DL
ALP SERPL-CCNC: 65 U/L (ref 30–99)
ALT SERPL-CCNC: 12 U/L (ref 2–50)
ANION GAP SERPL CALC-SCNC: 12 MMOL/L (ref 7–16)
APTT PPP: 29.9 SEC (ref 24.7–36)
AST SERPL-CCNC: 20 U/L (ref 12–45)
BASOPHILS # BLD AUTO: 0.8 % (ref 0–1.8)
BASOPHILS # BLD: 0.04 K/UL (ref 0–0.12)
BILIRUB SERPL-MCNC: 0.4 MG/DL (ref 0.1–1.5)
BUN SERPL-MCNC: 19 MG/DL (ref 8–22)
CALCIUM SERPL-MCNC: 9.4 MG/DL (ref 8.4–10.2)
CHLORIDE SERPL-SCNC: 101 MMOL/L (ref 96–112)
CO2 SERPL-SCNC: 23 MMOL/L (ref 20–33)
CREAT SERPL-MCNC: 1.09 MG/DL (ref 0.5–1.4)
EKG IMPRESSION: NORMAL
EOSINOPHIL # BLD AUTO: 0.17 K/UL (ref 0–0.51)
EOSINOPHIL NFR BLD: 3.5 % (ref 0–6.9)
ERYTHROCYTE [DISTWIDTH] IN BLOOD BY AUTOMATED COUNT: 63.6 FL (ref 35.9–50)
GLOBULIN SER CALC-MCNC: 5 G/DL (ref 1.9–3.5)
GLUCOSE SERPL-MCNC: 163 MG/DL (ref 65–99)
HCT VFR BLD AUTO: 48 % (ref 42–52)
HGB BLD-MCNC: 15.5 G/DL (ref 14–18)
IMM GRANULOCYTES # BLD AUTO: 0.03 K/UL (ref 0–0.11)
IMM GRANULOCYTES NFR BLD AUTO: 0.6 % (ref 0–0.9)
INR PPP: 1.31 (ref 0.87–1.13)
LIPASE SERPL-CCNC: 49 U/L (ref 7–58)
LYMPHOCYTES # BLD AUTO: 0.58 K/UL (ref 1–4.8)
LYMPHOCYTES NFR BLD: 12 % (ref 22–41)
MCH RBC QN AUTO: 30.5 PG (ref 27–33)
MCHC RBC AUTO-ENTMCNC: 32.3 G/DL (ref 33.7–35.3)
MCV RBC AUTO: 94.5 FL (ref 81.4–97.8)
MONOCYTES # BLD AUTO: 0.33 K/UL (ref 0–0.85)
MONOCYTES NFR BLD AUTO: 6.8 % (ref 0–13.4)
NEUTROPHILS # BLD AUTO: 3.67 K/UL (ref 1.82–7.42)
NEUTROPHILS NFR BLD: 76.3 % (ref 44–72)
NRBC # BLD AUTO: 0 K/UL
NRBC BLD-RTO: 0 /100 WBC
NT-PROBNP SERPL IA-MCNC: 1123 PG/ML (ref 0–125)
PLATELET # BLD AUTO: 236 K/UL (ref 164–446)
PMV BLD AUTO: 12.1 FL (ref 9–12.9)
POTASSIUM SERPL-SCNC: 4 MMOL/L (ref 3.6–5.5)
PROT SERPL-MCNC: 8.7 G/DL (ref 6–8.2)
PROTHROMBIN TIME: 16 SEC (ref 12–14.6)
RBC # BLD AUTO: 5.08 M/UL (ref 4.7–6.1)
SODIUM SERPL-SCNC: 136 MMOL/L (ref 135–145)
TROPONIN T SERPL-MCNC: 15 NG/L (ref 6–19)
TSH SERPL DL<=0.005 MIU/L-ACNC: 2.63 UIU/ML (ref 0.38–5.33)
WBC # BLD AUTO: 4.8 K/UL (ref 4.8–10.8)

## 2021-05-28 PROCEDURE — U0005 INFEC AGEN DETEC AMPLI PROBE: HCPCS

## 2021-05-28 PROCEDURE — U0003 INFECTIOUS AGENT DETECTION BY NUCLEIC ACID (DNA OR RNA); SEVERE ACUTE RESPIRATORY SYNDROME CORONAVIRUS 2 (SARS-COV-2) (CORONAVIRUS DISEASE [COVID-19]), AMPLIFIED PROBE TECHNIQUE, MAKING USE OF HIGH THROUGHPUT TECHNOLOGIES AS DESCRIBED BY CMS-2020-01-R: HCPCS

## 2021-05-28 PROCEDURE — 96375 TX/PRO/DX INJ NEW DRUG ADDON: CPT

## 2021-05-28 PROCEDURE — 85610 PROTHROMBIN TIME: CPT

## 2021-05-28 PROCEDURE — 99285 EMERGENCY DEPT VISIT HI MDM: CPT

## 2021-05-28 PROCEDURE — 85025 COMPLETE CBC W/AUTO DIFF WBC: CPT

## 2021-05-28 PROCEDURE — 700111 HCHG RX REV CODE 636 W/ 250 OVERRIDE (IP)

## 2021-05-28 PROCEDURE — 83690 ASSAY OF LIPASE: CPT

## 2021-05-28 PROCEDURE — 85730 THROMBOPLASTIN TIME PARTIAL: CPT

## 2021-05-28 PROCEDURE — 80053 COMPREHEN METABOLIC PANEL: CPT

## 2021-05-28 PROCEDURE — 84484 ASSAY OF TROPONIN QUANT: CPT

## 2021-05-28 PROCEDURE — G0378 HOSPITAL OBSERVATION PER HR: HCPCS

## 2021-05-28 PROCEDURE — 96374 THER/PROPH/DIAG INJ IV PUSH: CPT

## 2021-05-28 PROCEDURE — 84443 ASSAY THYROID STIM HORMONE: CPT

## 2021-05-28 PROCEDURE — 71045 X-RAY EXAM CHEST 1 VIEW: CPT

## 2021-05-28 PROCEDURE — 700111 HCHG RX REV CODE 636 W/ 250 OVERRIDE (IP): Performed by: EMERGENCY MEDICINE

## 2021-05-28 PROCEDURE — 93005 ELECTROCARDIOGRAM TRACING: CPT | Performed by: EMERGENCY MEDICINE

## 2021-05-28 PROCEDURE — 83880 ASSAY OF NATRIURETIC PEPTIDE: CPT

## 2021-05-28 PROCEDURE — 99220 PR INITIAL OBSERVATION CARE,LEVL III: CPT | Performed by: INTERNAL MEDICINE

## 2021-05-28 RX ORDER — ASPIRIN 325 MG
325 TABLET ORAL DAILY
Status: DISCONTINUED | OUTPATIENT
Start: 2021-05-29 | End: 2021-05-29

## 2021-05-28 RX ORDER — ENALAPRILAT 1.25 MG/ML
1.25 INJECTION INTRAVENOUS EVERY 6 HOURS PRN
Status: DISCONTINUED | OUTPATIENT
Start: 2021-05-28 | End: 2021-06-03 | Stop reason: HOSPADM

## 2021-05-28 RX ORDER — POLYETHYLENE GLYCOL 3350 17 G/17G
1 POWDER, FOR SOLUTION ORAL
Status: DISCONTINUED | OUTPATIENT
Start: 2021-05-28 | End: 2021-05-29

## 2021-05-28 RX ORDER — FINASTERIDE 5 MG/1
5 TABLET, FILM COATED ORAL DAILY
Status: DISCONTINUED | OUTPATIENT
Start: 2021-05-29 | End: 2021-06-03 | Stop reason: HOSPADM

## 2021-05-28 RX ORDER — ONDANSETRON 4 MG/1
4 TABLET, ORALLY DISINTEGRATING ORAL EVERY 4 HOURS PRN
Status: DISCONTINUED | OUTPATIENT
Start: 2021-05-28 | End: 2021-06-03 | Stop reason: HOSPADM

## 2021-05-28 RX ORDER — HYDROXYUREA 500 MG/1
500 CAPSULE ORAL 2 TIMES DAILY
Status: DISCONTINUED | OUTPATIENT
Start: 2021-05-29 | End: 2021-05-29

## 2021-05-28 RX ORDER — TAMSULOSIN HYDROCHLORIDE 0.4 MG/1
.4-.8 CAPSULE ORAL EVERY EVENING
Status: DISCONTINUED | OUTPATIENT
Start: 2021-05-29 | End: 2021-05-30

## 2021-05-28 RX ORDER — ASPIRIN 81 MG/1
324 TABLET, CHEWABLE ORAL DAILY
Status: DISCONTINUED | OUTPATIENT
Start: 2021-05-29 | End: 2021-05-29

## 2021-05-28 RX ORDER — ONDANSETRON 2 MG/ML
INJECTION INTRAMUSCULAR; INTRAVENOUS
Status: COMPLETED
Start: 2021-05-28 | End: 2021-05-28

## 2021-05-28 RX ORDER — AMOXICILLIN 250 MG
2 CAPSULE ORAL 2 TIMES DAILY
Status: DISCONTINUED | OUTPATIENT
Start: 2021-05-28 | End: 2021-05-29

## 2021-05-28 RX ORDER — ONDANSETRON 2 MG/ML
4 INJECTION INTRAMUSCULAR; INTRAVENOUS EVERY 4 HOURS PRN
Status: DISCONTINUED | OUTPATIENT
Start: 2021-05-28 | End: 2021-06-03 | Stop reason: HOSPADM

## 2021-05-28 RX ORDER — DEXTROSE MONOHYDRATE 25 G/50ML
50 INJECTION, SOLUTION INTRAVENOUS
Status: DISCONTINUED | OUTPATIENT
Start: 2021-05-28 | End: 2021-06-03 | Stop reason: HOSPADM

## 2021-05-28 RX ORDER — ACETAMINOPHEN 325 MG/1
650 TABLET ORAL EVERY 6 HOURS PRN
Status: DISCONTINUED | OUTPATIENT
Start: 2021-05-28 | End: 2021-06-03 | Stop reason: HOSPADM

## 2021-05-28 RX ORDER — METOPROLOL SUCCINATE 25 MG/1
25 TABLET, EXTENDED RELEASE ORAL EVERY EVENING
Status: DISCONTINUED | OUTPATIENT
Start: 2021-05-29 | End: 2021-06-03 | Stop reason: HOSPADM

## 2021-05-28 RX ORDER — BISACODYL 10 MG
10 SUPPOSITORY, RECTAL RECTAL
Status: DISCONTINUED | OUTPATIENT
Start: 2021-05-28 | End: 2021-05-29

## 2021-05-28 RX ORDER — ASPIRIN 600 MG/1
300 SUPPOSITORY RECTAL DAILY
Status: DISCONTINUED | OUTPATIENT
Start: 2021-05-29 | End: 2021-05-29

## 2021-05-28 RX ORDER — DIGOXIN 125 MCG
125 TABLET ORAL DAILY
Status: DISCONTINUED | OUTPATIENT
Start: 2021-05-29 | End: 2021-06-03 | Stop reason: HOSPADM

## 2021-05-28 RX ORDER — LABETALOL HYDROCHLORIDE 5 MG/ML
10 INJECTION, SOLUTION INTRAVENOUS EVERY 4 HOURS PRN
Status: DISCONTINUED | OUTPATIENT
Start: 2021-05-28 | End: 2021-06-03 | Stop reason: HOSPADM

## 2021-05-28 RX ADMIN — ONDANSETRON 4 MG: 2 INJECTION INTRAMUSCULAR; INTRAVENOUS at 23:29

## 2021-05-28 RX ADMIN — FENTANYL CITRATE 50 MCG: 50 INJECTION, SOLUTION INTRAMUSCULAR; INTRAVENOUS at 21:04

## 2021-05-28 ASSESSMENT — CHA2DS2 SCORE
PRIOR STROKE OR TIA OR THROMBOEMBOLISM: YES
AGE 75 OR GREATER: NO
CHF OR LEFT VENTRICULAR DYSFUNCTION: YES
HYPERTENSION: YES
DIABETES: YES
VASCULAR DISEASE: YES
SEX: MALE
AGE 65 TO 74: YES
CHA2DS2 VASC SCORE: 7

## 2021-05-28 ASSESSMENT — ENCOUNTER SYMPTOMS
FALLS: 0
SHORTNESS OF BREATH: 0
CHILLS: 0
DIARRHEA: 0
LOSS OF CONSCIOUSNESS: 0
COUGH: 0
HEADACHES: 1
DIZZINESS: 0
FEVER: 0
MYALGIAS: 0
DEPRESSION: 0
TINGLING: 0
SPUTUM PRODUCTION: 0
CONSTIPATION: 0
NAUSEA: 1
ABDOMINAL PAIN: 1
VOMITING: 1
PALPITATIONS: 0
STRIDOR: 0

## 2021-05-28 ASSESSMENT — PAIN DESCRIPTION - PAIN TYPE
TYPE: ACUTE PAIN
TYPE: ACUTE PAIN

## 2021-05-28 ASSESSMENT — FIBROSIS 4 INDEX
FIB4 SCORE: 1.86
FIB4 SCORE: 1.66

## 2021-05-29 ENCOUNTER — APPOINTMENT (OUTPATIENT)
Dept: CARDIOLOGY | Facility: MEDICAL CENTER | Age: 71
DRG: 391 | End: 2021-05-29
Attending: INTERNAL MEDICINE
Payer: MEDICARE

## 2021-05-29 ENCOUNTER — APPOINTMENT (OUTPATIENT)
Dept: RADIOLOGY | Facility: MEDICAL CENTER | Age: 71
DRG: 391 | End: 2021-05-29
Attending: INTERNAL MEDICINE
Payer: MEDICARE

## 2021-05-29 PROBLEM — I16.0 HYPERTENSIVE URGENCY: Status: ACTIVE | Noted: 2021-05-29

## 2021-05-29 LAB
ANION GAP SERPL CALC-SCNC: 10 MMOL/L (ref 7–16)
BUN SERPL-MCNC: 19 MG/DL (ref 8–22)
CALCIUM SERPL-MCNC: 9.2 MG/DL (ref 8.4–10.2)
CHLORIDE SERPL-SCNC: 104 MMOL/L (ref 96–112)
CO2 SERPL-SCNC: 22 MMOL/L (ref 20–33)
CREAT SERPL-MCNC: 0.97 MG/DL (ref 0.5–1.4)
DIGOXIN SERPL-MCNC: <0.3 NG/ML (ref 0.8–2)
EKG IMPRESSION: NORMAL
ERYTHROCYTE [DISTWIDTH] IN BLOOD BY AUTOMATED COUNT: 64.5 FL (ref 35.9–50)
GLUCOSE BLD-MCNC: 175 MG/DL (ref 65–99)
GLUCOSE BLD-MCNC: 182 MG/DL (ref 65–99)
GLUCOSE BLD-MCNC: 241 MG/DL (ref 65–99)
GLUCOSE SERPL-MCNC: 206 MG/DL (ref 65–99)
HCT VFR BLD AUTO: 47.4 % (ref 42–52)
HGB BLD-MCNC: 15 G/DL (ref 14–18)
LV EJECT FRACT  99904: 20
LV EJECT FRACT MOD 2C 99903: 32.87
LV EJECT FRACT MOD 4C 99902: 32.65
LV EJECT FRACT MOD BP 99901: 33.17
MCH RBC QN AUTO: 29.8 PG (ref 27–33)
MCHC RBC AUTO-ENTMCNC: 31.6 G/DL (ref 33.7–35.3)
MCV RBC AUTO: 94.2 FL (ref 81.4–97.8)
PLATELET # BLD AUTO: 211 K/UL (ref 164–446)
PMV BLD AUTO: 11.9 FL (ref 9–12.9)
POTASSIUM SERPL-SCNC: 4.3 MMOL/L (ref 3.6–5.5)
RBC # BLD AUTO: 5.03 M/UL (ref 4.7–6.1)
SARS-COV-2 RNA RESP QL NAA+PROBE: NOTDETECTED
SODIUM SERPL-SCNC: 136 MMOL/L (ref 135–145)
SPECIMEN SOURCE: NORMAL
TROPONIN T SERPL-MCNC: 15 NG/L (ref 6–19)
TROPONIN T SERPL-MCNC: 18 NG/L (ref 6–19)
WBC # BLD AUTO: 6.1 K/UL (ref 4.8–10.8)

## 2021-05-29 PROCEDURE — 93325 DOPPLER ECHO COLOR FLOW MAPG: CPT | Mod: 26 | Performed by: INTERNAL MEDICINE

## 2021-05-29 PROCEDURE — 93005 ELECTROCARDIOGRAM TRACING: CPT | Performed by: INTERNAL MEDICINE

## 2021-05-29 PROCEDURE — 700102 HCHG RX REV CODE 250 W/ 637 OVERRIDE(OP): Performed by: HOSPITALIST

## 2021-05-29 PROCEDURE — 96376 TX/PRO/DX INJ SAME DRUG ADON: CPT

## 2021-05-29 PROCEDURE — 99225 PR SUBSEQUENT OBSERVATION CARE,LEVEL II: CPT | Performed by: HOSPITALIST

## 2021-05-29 PROCEDURE — 93325 DOPPLER ECHO COLOR FLOW MAPG: CPT

## 2021-05-29 PROCEDURE — 82962 GLUCOSE BLOOD TEST: CPT | Mod: 91

## 2021-05-29 PROCEDURE — G0378 HOSPITAL OBSERVATION PER HR: HCPCS

## 2021-05-29 PROCEDURE — 700117 HCHG RX CONTRAST REV CODE 255: Performed by: INTERNAL MEDICINE

## 2021-05-29 PROCEDURE — A9270 NON-COVERED ITEM OR SERVICE: HCPCS | Performed by: HOSPITALIST

## 2021-05-29 PROCEDURE — 84484 ASSAY OF TROPONIN QUANT: CPT

## 2021-05-29 PROCEDURE — 93010 ELECTROCARDIOGRAM REPORT: CPT | Performed by: STUDENT IN AN ORGANIZED HEALTH CARE EDUCATION/TRAINING PROGRAM

## 2021-05-29 PROCEDURE — 85027 COMPLETE CBC AUTOMATED: CPT

## 2021-05-29 PROCEDURE — A9270 NON-COVERED ITEM OR SERVICE: HCPCS | Performed by: INTERNAL MEDICINE

## 2021-05-29 PROCEDURE — 700102 HCHG RX REV CODE 250 W/ 637 OVERRIDE(OP): Performed by: INTERNAL MEDICINE

## 2021-05-29 PROCEDURE — 93321 DOPPLER ECHO F-UP/LMTD STD: CPT | Mod: 26 | Performed by: INTERNAL MEDICINE

## 2021-05-29 PROCEDURE — 96372 THER/PROPH/DIAG INJ SC/IM: CPT

## 2021-05-29 PROCEDURE — 80162 ASSAY OF DIGOXIN TOTAL: CPT

## 2021-05-29 PROCEDURE — 700111 HCHG RX REV CODE 636 W/ 250 OVERRIDE (IP)

## 2021-05-29 PROCEDURE — 700111 HCHG RX REV CODE 636 W/ 250 OVERRIDE (IP): Performed by: INTERNAL MEDICINE

## 2021-05-29 PROCEDURE — 93308 TTE F-UP OR LMTD: CPT | Mod: 26 | Performed by: INTERNAL MEDICINE

## 2021-05-29 PROCEDURE — 80048 BASIC METABOLIC PNL TOTAL CA: CPT

## 2021-05-29 PROCEDURE — A9502 TC99M TETROFOSMIN: HCPCS

## 2021-05-29 RX ORDER — POLYETHYLENE GLYCOL 3350 17 G/17G
1 POWDER, FOR SOLUTION ORAL
Status: DISCONTINUED | OUTPATIENT
Start: 2021-05-29 | End: 2021-06-03 | Stop reason: HOSPADM

## 2021-05-29 RX ORDER — OXYCODONE HYDROCHLORIDE 10 MG/1
10 TABLET ORAL EVERY 4 HOURS PRN
Status: DISCONTINUED | OUTPATIENT
Start: 2021-05-29 | End: 2021-06-03 | Stop reason: HOSPADM

## 2021-05-29 RX ORDER — AMINOPHYLLINE 25 MG/ML
INJECTION, SOLUTION INTRAVENOUS
Status: COMPLETED
Start: 2021-05-29 | End: 2021-05-29

## 2021-05-29 RX ORDER — HYDROXYUREA 500 MG/1
500 CAPSULE ORAL 2 TIMES DAILY
Status: DISCONTINUED | OUTPATIENT
Start: 2021-05-29 | End: 2021-06-03 | Stop reason: HOSPADM

## 2021-05-29 RX ORDER — TRAMADOL HYDROCHLORIDE 50 MG/1
50 TABLET ORAL EVERY 6 HOURS PRN
Status: DISCONTINUED | OUTPATIENT
Start: 2021-05-29 | End: 2021-06-03 | Stop reason: HOSPADM

## 2021-05-29 RX ORDER — REGADENOSON 0.08 MG/ML
INJECTION, SOLUTION INTRAVENOUS
Status: COMPLETED
Start: 2021-05-29 | End: 2021-05-29

## 2021-05-29 RX ORDER — ASPIRIN 81 MG/1
324 TABLET, CHEWABLE ORAL DAILY
Status: DISCONTINUED | OUTPATIENT
Start: 2021-05-30 | End: 2021-05-29

## 2021-05-29 RX ORDER — OXYCODONE HYDROCHLORIDE 5 MG/1
10 TABLET ORAL EVERY 6 HOURS PRN
COMMUNITY
End: 2021-06-30

## 2021-05-29 RX ORDER — BISACODYL 10 MG
10 SUPPOSITORY, RECTAL RECTAL
Status: DISCONTINUED | OUTPATIENT
Start: 2021-05-29 | End: 2021-06-03 | Stop reason: HOSPADM

## 2021-05-29 RX ORDER — TRAMADOL HYDROCHLORIDE 50 MG/1
50 TABLET ORAL EVERY 6 HOURS PRN
Status: DISCONTINUED | OUTPATIENT
Start: 2021-05-29 | End: 2021-05-29

## 2021-05-29 RX ORDER — AMOXICILLIN 250 MG
2 CAPSULE ORAL 2 TIMES DAILY
Status: DISCONTINUED | OUTPATIENT
Start: 2021-05-29 | End: 2021-06-03 | Stop reason: HOSPADM

## 2021-05-29 RX ORDER — ASPIRIN 325 MG
325 TABLET ORAL DAILY
Status: DISCONTINUED | OUTPATIENT
Start: 2021-05-30 | End: 2021-05-29

## 2021-05-29 RX ORDER — ASPIRIN 600 MG/1
300 SUPPOSITORY RECTAL DAILY
Status: DISCONTINUED | OUTPATIENT
Start: 2021-05-30 | End: 2021-05-29

## 2021-05-29 RX ADMIN — ONDANSETRON 4 MG: 4 TABLET, ORALLY DISINTEGRATING ORAL at 09:55

## 2021-05-29 RX ADMIN — TRAMADOL HYDROCHLORIDE 50 MG: 50 TABLET, FILM COATED ORAL at 01:57

## 2021-05-29 RX ADMIN — DOCUSATE SODIUM 50 MG AND SENNOSIDES 8.6 MG 2 TABLET: 8.6; 5 TABLET, FILM COATED ORAL at 11:04

## 2021-05-29 RX ADMIN — AMINOPHYLLINE 100 MG: 25 INJECTION, SOLUTION INTRAVENOUS at 09:10

## 2021-05-29 RX ADMIN — DIGOXIN 125 MCG: 125 TABLET ORAL at 21:25

## 2021-05-29 RX ADMIN — INSULIN HUMAN 2 UNITS: 100 INJECTION, SOLUTION PARENTERAL at 21:17

## 2021-05-29 RX ADMIN — INSULIN HUMAN 3 UNITS: 100 INJECTION, SOLUTION PARENTERAL at 11:43

## 2021-05-29 RX ADMIN — INSULIN HUMAN 2 UNITS: 100 INJECTION, SOLUTION PARENTERAL at 18:17

## 2021-05-29 RX ADMIN — FINASTERIDE 5 MG: 5 TABLET, FILM COATED ORAL at 21:26

## 2021-05-29 RX ADMIN — HUMAN ALBUMIN MICROSPHERES AND PERFLUTREN 3 ML: 10; .22 INJECTION, SOLUTION INTRAVENOUS at 07:34

## 2021-05-29 RX ADMIN — ONDANSETRON 4 MG: 2 INJECTION INTRAMUSCULAR; INTRAVENOUS at 22:38

## 2021-05-29 RX ADMIN — APIXABAN 5 MG: 5 TABLET, FILM COATED ORAL at 11:04

## 2021-05-29 RX ADMIN — FINASTERIDE 5 MG: 5 TABLET, FILM COATED ORAL at 17:29

## 2021-05-29 RX ADMIN — OXYCODONE HYDROCHLORIDE 10 MG: 10 TABLET ORAL at 17:29

## 2021-05-29 RX ADMIN — TAMSULOSIN HYDROCHLORIDE 0.4 MG: 0.4 CAPSULE ORAL at 21:28

## 2021-05-29 RX ADMIN — ONDANSETRON 4 MG: 2 INJECTION INTRAMUSCULAR; INTRAVENOUS at 03:34

## 2021-05-29 RX ADMIN — OXYCODONE HYDROCHLORIDE 10 MG: 10 TABLET ORAL at 21:35

## 2021-05-29 RX ADMIN — HYDROXYUREA 500 MG: 500 CAPSULE ORAL at 21:27

## 2021-05-29 RX ADMIN — HYDROXYUREA 500 MG: 500 CAPSULE ORAL at 11:03

## 2021-05-29 RX ADMIN — METOPROLOL SUCCINATE 25 MG: 25 TABLET, EXTENDED RELEASE ORAL at 21:27

## 2021-05-29 RX ADMIN — APIXABAN 5 MG: 5 TABLET, FILM COATED ORAL at 21:27

## 2021-05-29 RX ADMIN — DOCUSATE SODIUM 50 MG AND SENNOSIDES 8.6 MG 2 TABLET: 8.6; 5 TABLET, FILM COATED ORAL at 21:26

## 2021-05-29 RX ADMIN — REGADENOSON 0.4 MG: 0.08 INJECTION, SOLUTION INTRAVENOUS at 09:06

## 2021-05-29 RX ADMIN — METOPROLOL SUCCINATE 25 MG: 25 TABLET, EXTENDED RELEASE ORAL at 17:29

## 2021-05-29 ASSESSMENT — PAIN DESCRIPTION - PAIN TYPE
TYPE: CHRONIC PAIN
TYPE: ACUTE PAIN

## 2021-05-29 ASSESSMENT — COGNITIVE AND FUNCTIONAL STATUS - GENERAL
SUGGESTED CMS G CODE MODIFIER MOBILITY: CJ
STANDING UP FROM CHAIR USING ARMS: A LITTLE
MOBILITY SCORE: 21
DAILY ACTIVITIY SCORE: 24
SUGGESTED CMS G CODE MODIFIER DAILY ACTIVITY: CH
CLIMB 3 TO 5 STEPS WITH RAILING: A LITTLE
WALKING IN HOSPITAL ROOM: A LITTLE

## 2021-05-29 ASSESSMENT — LIFESTYLE VARIABLES
EVER FELT BAD OR GUILTY ABOUT YOUR DRINKING: NO
EVER HAD A DRINK FIRST THING IN THE MORNING TO STEADY YOUR NERVES TO GET RID OF A HANGOVER: NO
TOTAL SCORE: 0
ON A TYPICAL DAY WHEN YOU DRINK ALCOHOL HOW MANY DRINKS DO YOU HAVE: 0
AVERAGE NUMBER OF DAYS PER WEEK YOU HAVE A DRINK CONTAINING ALCOHOL: 0
HAVE PEOPLE ANNOYED YOU BY CRITICIZING YOUR DRINKING: NO
ALCOHOL_USE: NO
TOTAL SCORE: 0
CONSUMPTION TOTAL: NEGATIVE
TOTAL SCORE: 0
HAVE YOU EVER FELT YOU SHOULD CUT DOWN ON YOUR DRINKING: NO
HOW MANY TIMES IN THE PAST YEAR HAVE YOU HAD 5 OR MORE DRINKS IN A DAY: 0

## 2021-05-29 ASSESSMENT — ENCOUNTER SYMPTOMS
NAUSEA: 1
HEADACHES: 1
SPUTUM PRODUCTION: 0
ABDOMINAL PAIN: 1
FALLS: 0
DIARRHEA: 0
CONSTIPATION: 0
DEPRESSION: 0
COUGH: 0
PALPITATIONS: 0
MYALGIAS: 0
LOSS OF CONSCIOUSNESS: 0
SHORTNESS OF BREATH: 0
DIZZINESS: 0
EYE REDNESS: 0
TINGLING: 0
EYE DISCHARGE: 0
STRIDOR: 0

## 2021-05-29 NOTE — H&P
"Hospital Medicine History & Physical Note    Date of Service  5/28/2021    Primary Care Physician  CONNER Harman.    Consultants  None    Code Status  Full Code    Chief Complaint  Chief Complaint   Patient presents with   • Altered Mental Status     Reports feeling \"disoriented, it got really bad about 2 hours ago\". Reports also headache.   • Vomiting     Pt. reports \"I think I'm having a heart attack, it's all in my records\". Denies CP.        History of Presenting Illness  70 y.o. male who presented 5/28/2021 with abdominal pain, nausea, vomiting, headache and disorientation.  Patient states over the last couple of weeks he has had worsening fatigue as well as intermittent chest pain.  He did see his cardiologist approximately a week ago, there is a stress and echo planned.  He states he was doing okay until around 2 PM today when he began having his symptoms.  It self to get an exact timeframe but it sounds like he began becoming nauseous then a headache then vomiting.  Once he began vomiting his family noted some confusion and he classified it as \"disorientation\".  He denies vertigo.  He denies any fever or chills.  No known sick contacts.  He states his abdominal pain and headache can be severe at times, sharp in nature.  He states his chest pain is mild.  I did discuss the case including labs and imaging with the ER physician.    Review of Systems  Review of Systems   Constitutional: Positive for malaise/fatigue. Negative for chills and fever.   HENT: Negative for congestion.    Respiratory: Negative for cough, sputum production, shortness of breath and stridor.    Cardiovascular: Positive for chest pain. Negative for palpitations and leg swelling.   Gastrointestinal: Positive for abdominal pain, nausea and vomiting. Negative for constipation and diarrhea.   Genitourinary: Negative for dysuria and urgency.   Musculoskeletal: Negative for falls and myalgias.   Neurological: Positive for headaches. " "Negative for dizziness, tingling and loss of consciousness.   Psychiatric/Behavioral: Negative for depression and suicidal ideas.   All other systems reviewed and are negative.      Past Medical History   has a past medical history of Anesthesia, Cancer ([facility]), Diabetes ([facility]), Family history of punctured lung (), Heart attack ([facility]) (2017), Hemorrhagic disorder ([facility]), High cholesterol, Ischemic cardiomyopathy, Kidney stones, Orthostatic hypotension (3/29/2018), Pain, Polycythemia vera(238.4), Stroke ([facility]) (), Urinary bladder disorder, and Vertigo.    Surgical History   has a past surgical history that includes other cardiac surgery; cath placement; laminotomy; appendectomy; carotid endarterectomy (Right, 3/21/2018); temporal artery biopsy (Right, 2018); pr incis/drain scrotum/testis,epididym (Right, 2020); pr explore scrotum (Right, 2020); pr explore scrotum (Right, 2020); skin abscess incision and drainage (Right, 8/3/2020); and split thickness skin graft (N/A, 2020).     Family History  Reviewed, nonpertinent    Social History   reports that he has never smoked. He has never used smokeless tobacco. He reports that he does not drink alcohol and does not use drugs.    Allergies  Allergies   Allergen Reactions   • Doxycycline      Swelling lips and difficulties swallowing   • Beta Adrenergic Blockers Unspecified     dizziness   • Metformin Unspecified and Palpitations     Cardiac effects  \"Similar to a heart attack, I was hospitalized\"   • Simvastatin      Other reaction(s): Other (Specify with Comments)  Myalgias  Myalgias   • Statins [Hmg-Coa-R Inhibitors]      Muscle ache, joint pain       Medications  Prior to Admission Medications   Prescriptions Last Dose Informant Patient Reported? Taking?   Blood Glucose Monitoring Suppl (ONETOUCH VERIO) w/Device Kit   No No   Si Device 3 times a day before meals.   Blood Glucose Test Strips  Patient No No   Sig: verio test strips for " glucose monitoring TID and PRN   ELIQUIS 5 MG Tab 5/28/2021 at pm  No No   Sig: TAKE 1 TABLET BY MOUTH TWICE A DAY   Insulin Degludec (TRESIBA FLEXTOUCH) 200 UNIT/ML Solution Pen-injector 5/28/2021 at 1600  No No   Sig: Inject 12 Units under the skin every evening.   ONETOUCH VERIO strip   Yes No   Sig: VERIO TEST STRIPS FOR GLUCOSE MONITORING 3 TIMES A DAY AND AS NEEDED *INS COVERS 100 PER 90 DAY   aspirin EC (ECOTRIN) 81 MG Tablet Delayed Response 5/28/2021 at am Patient Yes No   Sig: Take 1 Tab by mouth every day.   colesevelam (WELCHOL) 625 MG Tab 5/28/2021 at am  No No   Sig: Take 3 Tabs by mouth 2 times a day, with meals.   Patient taking differently: Take 1,875 mg by mouth every 48 hours.   digoxin (LANOXIN) 125 MCG Tab 5/28/2021 at pm  No No   Sig: Take 1 tablet by mouth every day at 6 PM.   finasteride (PROSCAR) 5 MG Tab 5/28/2021 at pm  No No   Sig: TAKE 1 TABLET BY MOUTH EVERY DAY   hydroxyurea (HYDREA) 500 MG Cap 5/28/2021 at pm Patient Yes No   Sig: Take 500 mg by mouth 2 Times a Day.   metoprolol SR (TOPROL XL) 25 MG TABLET SR 24 HR 5/28/2021 at pm  No No   Sig: Take 1 tablet by mouth every evening.   spironolactone (ALDACTONE) 25 MG Tab 5/28/2021 at am  No No   Sig: Take 1 tablet by mouth every day.   tamsulosin (FLOMAX) 0.4 MG capsule 5/28/2021 at pm Patient No No   Sig: Take 2 Caps by mouth every day.   Patient taking differently: Take 0.4-0.8 mg by mouth every evening.   therapeutic multivitamin-minerals (THERAGRAN-M) Tab 5/28/2021 at am Patient Yes No   Sig: Take 1 Tab by mouth every day.      Facility-Administered Medications: None       Physical Exam  Temp:  [35.9 °C (96.7 °F)] 35.9 °C (96.7 °F)  Pulse:  [59-64] 61  Resp:  [18] 18  BP: (146-193)/(81-97) 155/90  SpO2:  [95 %-100 %] 97 %    Physical Exam  Vitals and nursing note reviewed.   Constitutional:       General: He is not in acute distress.     Appearance: He is well-developed. He is not toxic-appearing or diaphoretic.   HENT:      Head:  Normocephalic and atraumatic.      Right Ear: External ear normal.      Left Ear: External ear normal.      Nose: Nose normal. No congestion or rhinorrhea.      Mouth/Throat:      Mouth: Mucous membranes are dry.      Pharynx: No oropharyngeal exudate.   Eyes:      General:         Right eye: No discharge.         Left eye: No discharge.      Extraocular Movements: Extraocular movements intact.   Neck:      Trachea: No tracheal deviation.   Cardiovascular:      Rate and Rhythm: Normal rate and regular rhythm.      Heart sounds: No murmur heard.   No friction rub. No gallop.    Pulmonary:      Effort: Pulmonary effort is normal. No respiratory distress.      Breath sounds: Normal breath sounds. No stridor. No wheezing or rales.   Chest:      Chest wall: No tenderness.   Abdominal:      General: Bowel sounds are normal. There is no distension.      Palpations: Abdomen is soft.      Tenderness: There is abdominal tenderness in the right lower quadrant and left lower quadrant.   Musculoskeletal:         General: No tenderness. Normal range of motion.      Cervical back: Normal range of motion and neck supple. No edema or erythema.      Right lower leg: Edema (mild) present.      Left lower leg: Edema (mild) present.   Lymphadenopathy:      Cervical: No cervical adenopathy.   Skin:     General: Skin is warm and dry.      Findings: No erythema or rash.   Neurological:      General: No focal deficit present.      Mental Status: He is alert and oriented to person, place, and time.      Cranial Nerves: No cranial nerve deficit.   Psychiatric:         Mood and Affect: Mood normal.         Behavior: Behavior normal.         Thought Content: Thought content normal.         Judgment: Judgment normal.         Laboratory:  Recent Labs     05/28/21 2022   WBC 4.8   RBC 5.08   HEMOGLOBIN 15.5   HEMATOCRIT 48.0   MCV 94.5   MCH 30.5   MCHC 32.3*   RDW 63.6*   PLATELETCT 236   MPV 12.1     Recent Labs     05/28/21 2022   SODIUM 136    POTASSIUM 4.0   CHLORIDE 101   CO2 23   GLUCOSE 163*   BUN 19   CREATININE 1.09   CALCIUM 9.4     Recent Labs     05/28/21 2022   ALTSGPT 12   ASTSGOT 20   ALKPHOSPHAT 65   TBILIRUBIN 0.4   LIPASE 49   GLUCOSE 163*     Recent Labs     05/28/21 2128   APTT 29.9   INR 1.31*     Recent Labs     05/28/21 2022   NTPROBNP 1123*         Recent Labs     05/28/21 2022   TROPONINT 15       Imaging:  DX-CHEST-PORTABLE (1 VIEW)   Final Result      No radiographic evidence of acute cardiopulmonary process.      NM-CARDIAC STRESS TEST    (Results Pending)   EC-ECHOCARDIOGRAM LTD W/O CONT    (Results Pending)         Assessment/Plan:  I anticipate this patient is appropriate for observation status at this time.    * AP (abdominal pain)- (present on admission)  Assessment & Plan  -Associated with nausea and vomiting as well as a headache and disorientation  -Possibly gastroenteritis  -Symptomatic care only  -Patient has been having normal bowel movements  -No sick contacts  -I feel he is slightly dehydrated causing his disorientation however with his history of heart failure, avoid ongoing IV fluids, hold home spironolactone    Chronic systolic heart failure (HCC)- (present on admission)  Assessment & Plan  -With worsening over the last 2 weeks of fatigue  -Intermittent chest pain  -Patient does have a high risk of worsening, at baseline he has an ejection fraction of 20%  -Obtain stress test as well as echocardiogram  -He does have some mild pedal edema    SLE (systemic lupus erythematosus related syndrome) ([facility])- (present on admission)  Assessment & Plan  -Chronic, continue home meds    Type 2 diabetes mellitus with complication, without long-term current use of insulin ([facility])- (present on admission)  Assessment & Plan  -Start insulin sliding scale  -Adjust as needed    Paroxysmal A-fib ([facility])- (present on admission)  Assessment & Plan  -Currently sinus on metoprolol and digoxin  -Continue home Eliquis    Benign prostatic  hyperplasia without lower urinary tract symptoms- (present on admission)  Assessment & Plan  -Continue home Proscar and Flomax  -No urinary complaints

## 2021-05-29 NOTE — PROGRESS NOTES
Patient refusing morning medications due to being NPO and feeling some nausea. Patient requesting to take medication later after his stress test. Rx message sent for medication times to be moved per the patient's request.

## 2021-05-29 NOTE — ED NOTES
"Pt states that he is in pain and wants \"something to settle my stomach.\" MD notified of request and aware.   "

## 2021-05-29 NOTE — PROGRESS NOTES
[facility] Medicine Daily Progress Note    Date of Service  5/29/2021    Chief Complaint  70 y.o. male admitted 5/28/2021 with nausea vomiting abdominal pain    Hospital Course  70M, PMHx C-S-HF, PAfib, SLE, BPH  Admitted 5/28 with nausea, chest & abd pain     Interval Problem Update  Heart rate 50s-60s saturating well on room air.  Patient still does not feel well has nausea and vomiting and abdominal discomfort.  This constellation can be seen in digoxin toxicity I will check digoxin levels.  Plan for stress test and echocardiography.  Watch volume status closely.  Blood pressure better this morning no longer having hypertensive urgency.  Discussed with patient, patient's nurse and with multidisciplinary team during rounds including , pharmacist and charge nurse.      Consultants/Specialty  None     Code Status  Full Code    Disposition  Anticipate medical clearance in 1-2 days    Review of Systems  Review of Systems   Constitutional: Positive for malaise/fatigue.   HENT: Negative for congestion.    Eyes: Negative for discharge and redness.   Respiratory: Negative for cough, sputum production, shortness of breath and stridor.    Cardiovascular: Positive for chest pain. Negative for palpitations and leg swelling.   Gastrointestinal: Positive for abdominal pain and nausea. Negative for constipation and diarrhea.   Genitourinary: Negative for dysuria and urgency.   Musculoskeletal: Negative for falls and myalgias.   Neurological: Positive for headaches. Negative for dizziness, tingling and loss of consciousness.   Psychiatric/Behavioral: Negative for depression and suicidal ideas.      Physical Exam  Temp:  [35.9 °C (96.7 °F)-36.6 °C (97.9 °F)] 36.3 °C (97.4 °F)  Pulse:  [50-67] 67  Resp:  [18] 18  BP: (139-193)/(73-97) 139/73  SpO2:  [95 %-100 %] 97 %    Physical Exam  Vitals and nursing note reviewed.   Constitutional:       General: He is not in acute distress.     Appearance: He is well-developed. He is  not toxic-appearing or diaphoretic.   HENT:      Head: Normocephalic and atraumatic.      Right Ear: External ear normal.      Left Ear: External ear normal.      Nose: Nose normal. No congestion or rhinorrhea.      Mouth/Throat:      Mouth: Mucous membranes are dry.      Pharynx: No oropharyngeal exudate.   Eyes:      General:         Right eye: No discharge.         Left eye: No discharge.      Extraocular Movements: Extraocular movements intact.   Neck:      Trachea: No tracheal deviation.   Cardiovascular:      Rate and Rhythm: Regular rhythm. Bradycardia present.      Heart sounds: No murmur heard.   No friction rub. No gallop.    Pulmonary:      Effort: Pulmonary effort is normal.      Breath sounds: No stridor.   Abdominal:      General: Bowel sounds are normal. There is no distension.      Palpations: Abdomen is soft.      Tenderness: There is abdominal tenderness in the right lower quadrant and left lower quadrant.   Musculoskeletal:         General: Normal range of motion.      Cervical back: Normal range of motion and neck supple. No edema or erythema.      Right lower leg: Edema present.      Left lower leg: Edema present.   Lymphadenopathy:      Cervical: No cervical adenopathy.   Skin:     General: Skin is warm and dry.      Findings: No erythema or rash.   Neurological:      General: No focal deficit present.      Mental Status: He is alert and oriented to person, place, and time.      Cranial Nerves: No cranial nerve deficit.   Psychiatric:         Mood and Affect: Mood normal.       Fluids    Intake/Output Summary (Last 24 hours) at 5/29/2021 1600  Last data filed at 5/29/2021 0730  Gross per 24 hour   Intake 0 ml   Output 200 ml   Net -200 ml       Laboratory  Recent Labs     05/28/21 2022 05/29/21  0032   WBC 4.8 6.1   RBC 5.08 5.03   HEMOGLOBIN 15.5 15.0   HEMATOCRIT 48.0 47.4   MCV 94.5 94.2   MCH 30.5 29.8   MCHC 32.3* 31.6*   RDW 63.6* 64.5*   PLATELETCT 236 211   MPV 12.1 11.9     Recent  Labs     05/28/21 2022 05/29/21  0032   SODIUM 136 136   POTASSIUM 4.0 4.3   CHLORIDE 101 104   CO2 23 22   GLUCOSE 163* 206*   BUN 19 19   CREATININE 1.09 0.97   CALCIUM 9.4 9.2     Recent Labs     05/28/21 2128   APTT 29.9   INR 1.31*               Imaging  NM-CARDIAC STRESS TEST         EC-ECHOCARDIOGRAM LTD W/ CONT   Final Result      DX-CHEST-PORTABLE (1 VIEW)   Final Result      No radiographic evidence of acute cardiopulmonary process.           Assessment/Plan  * AP (abdominal pain)- (present on admission)  Assessment & Plan  Associated with nausea and vomiting    Differentials include gastroenteritis  Bowel rest, advance diet as tolerated     Chronic systolic heart failure (HCC)- (present on admission)  Assessment & Plan  With worsening over the last 2 weeks of fatigue  Intermittent chest pain  Obtain stress test as well as echocardiogram  Appears close to the dry side however has mild mild lower extremity edema  Monitor volume status closely    Paroxysmal A-fib (HCC)- (present on admission)  Assessment & Plan  Currently sinus on metoprolol and digoxin  Will obtain digoxin level  Continue home Eliquis      Hypertensive urgency- (present on admission)  Assessment & Plan  Resume home metoprolol with hold parameters.  We will start labetalol for extreme hypertension    Type 2 diabetes mellitus with complication, without long-term current use of insulin (HCC)- (present on admission)  Assessment & Plan  With hyperglycemia  Last glycated hemoglobin was 7.1%  Short acting insulin  Accu-Checks, hypoglycemia protocol     Benign prostatic hyperplasia without lower urinary tract symptoms- (present on admission)  Assessment & Plan  Continue home Proscar and Flomax  Monitor input output, check bladder scans    SLE (systemic lupus erythematosus related syndrome) (HCC)- (present on admission)  Assessment & Plan  Resume home meds     VTE prophylaxis: Apixaban

## 2021-05-29 NOTE — ED NOTES
Medication reconciliation process completed by interviewing the patient at bedside. The patient is on apixaban 5 mg PO BID (last dose taken tonight prior to coming to the ER)  No antibiotics in the last 14 days.    [employer]ova, RakanD, BCPS

## 2021-05-29 NOTE — DISCHARGE PLANNING
Care Transition Team Discharge Planning     Anticipated Discharge Disposition: TBD     Action: Per second rounds, Md indicates pt may d/c in another day post stress test.       Barriers to Discharge: not medically clear/stable     Plan: Lsw will continue to follow, and assist w/ d/c planning.

## 2021-05-29 NOTE — ED PROVIDER NOTES
" ED Provider Note    CHIEF COMPLAINT  Chief Complaint   Patient presents with   • Altered Mental Status     Reports feeling \"disoriented, it got really bad about 2 hours ago\". Reports also headache.   • Vomiting     Pt. reports \"I think I'm having a heart attack, it's all in my records\". Denies CP.        HPI  Francesco Schulte is a 70 y.o. male who presents generalized weakness, nausea, vomiting.  No active chest pain though feels short of breath.  No cough or fevers.  Had about 4 episodes of vomiting within the past 30 minutes though no recent vomiting otherwise.  No significant abdominal pain symptoms.  He feels generalized weakness and feels slightly \"out of it\".  He is speaking clearly however and responding to questions appropriately.  He states that he has had on and off again chest pain over the past 3 or 4 weeks.  He was seen by cardiology as an outpatient on the 20th and was seen by an APRN at that time.  He is followed by Dr. Song.      Apparently, the patient has a history of congestive heart failure, A. fib/paroxysmal A. fib, lupus, polycythemia vera.  He states that since January he has not taken any medications for lupus after he contracted coronavirus at that time.    Patient has had STEMI in the past.  Recommending stress testing.  Patient is supposed to be on Eliquis for chronic A. fib.    REVIEW OF SYSTEMS  See HPI for further details. All other systems are negative.     PAST MEDICAL HISTORY   has a past medical history of Anesthesia, Cancer ([facility]), Diabetes (HCC), Family history of punctured lung (2002), Heart attack ([facility]) (03/2017), Hemorrhagic disorder ([facility]), High cholesterol, Ischemic cardiomyopathy, Kidney stones, Orthostatic hypotension (3/29/2018), Pain, Polycythemia vera(238.4), Stroke ([facility]) (2016), Urinary bladder disorder, and Vertigo.    SOCIAL HISTORY  Social History     Tobacco Use   • Smoking status: Never Smoker   • Smokeless tobacco: Never Used   Vaping Use   • Vaping Use: " "Never used   Substance and Sexual Activity   • Alcohol use: No   • Drug use: No   • Sexual activity: Not on file       SURGICAL HISTORY   has a past surgical history that includes other cardiac surgery; cath placement; laminotomy; appendectomy; carotid endarterectomy (Right, 3/21/2018); temporal artery biopsy (Right, 6/26/2018); incis/drain scrotum/testis,epididym (Right, 7/25/2020); explore scrotum (Right, 7/29/2020); explore scrotum (Right, 7/31/2020); skin abscess incision and drainage (Right, 8/3/2020); and split thickness skin graft (N/A, 8/23/2020).    CURRENT MEDICATIONS  Home Medications     Reviewed by Rakan ChavezD (Pharmacist) on 05/28/21 at 2216  Med List Status: Complete   Medication Last Dose Status   aspirin EC (ECOTRIN) 81 MG Tablet Delayed Response 5/28/2021 Active   Blood Glucose Monitoring Suppl (ONETOUCH VERIO) w/Device Kit  Active   Blood Glucose Test Strips  Active   colesevelam (WELCHOL) 625 MG Tab 5/28/2021 Active   digoxin (LANOXIN) 125 MCG Tab 5/28/2021 Active   ELIQUIS 5 MG Tab 5/28/2021 Active   finasteride (PROSCAR) 5 MG Tab 5/28/2021 Active   hydroxyurea (HYDREA) 500 MG Cap 5/28/2021 Active   Insulin Degludec (TRESIBA FLEXTOUCH) 200 UNIT/ML Solution Pen-injector 5/28/2021 Active   metoprolol SR (TOPROL XL) 25 MG TABLET SR 24 HR 5/28/2021 Active   ONETOUCH VERIO strip  Active   spironolactone (ALDACTONE) 25 MG Tab 5/28/2021 Active   tamsulosin (FLOMAX) 0.4 MG capsule 5/28/2021 Active   therapeutic multivitamin-minerals (THERAGRAN-M) Tab 5/28/2021 Active                ALLERGIES  Allergies   Allergen Reactions   • Doxycycline      Swelling lips and difficulties swallowing   • Beta Adrenergic Blockers Unspecified     dizziness   • Metformin Unspecified and Palpitations     Cardiac effects  \"Similar to a heart attack, I was hospitalized\"   • Simvastatin      Other reaction(s): Other (Specify with Comments)  Myalgias  Myalgias   • Statins [Hmg-Coa-R Inhibitors]      Muscle " "ache, joint pain       PHYSICAL EXAM  VITAL SIGNS: BP (!) 193/97   Pulse (!) 59   Temp 35.9 °C (96.7 °F) (Temporal)   Resp 18   Ht 1.905 m (6' 3\")   SpO2 95%   BMI 23.75 kg/m²   Pulse ox interpretation: I interpret this pulse ox as normal.  Constitutional: Alert in no apparent distress.  HENT: No signs of trauma  Eyes: Pupils are equal and reactive, Conjunctiva normal, Non-icteric.   Neck: Normal range of motion, No tenderness, Supple, No stridor.   Cardiovascular: Regular rate and rhythm.   Thorax & Lungs: Normal breath sounds, No respiratory distress, No wheezing, No chest tenderness.   Abdomen: Bowel sounds normal, Soft, No tenderness, No masses, No pulsatile masses. No peritoneal signs.  Skin: Warm, Dry, No erythema, No rash.   Extremities: Intact distal pulses, No edema, No tenderness, No cyanosis  Musculoskeletal: Good range of motion in all major joints. No major deformities noted.   Neurologic: Alert, No focal deficits noted.       DIAGNOSTIC STUDIES / PROCEDURES    EKG - Physician interpretation  Results for orders placed or performed during the hospital encounter of 21   EKG   Result Value Ref Range    Report       [facility] Emergency Dept.    Test Date:  2021  Pt Name:    MARZENA ROMEO             Department: [facility]  MRN:        [id number]                      Room:       -ROOM 7  Gender:     Male                         Technician: HRR  :        1950                   Requested By:TOMASZ DESHPANDE  Order #:    [id number]                    Reading MD: TOMASZ DESHPANDE MD    Measurements  Intervals                                Axis  Rate:       61                           P:  CO:                                      QRS:        -48  QRSD:       123                          T:          129  QT:         423  QTc:        426    Interpretive Statements  Atrial flutter  Left bundle branch block  Anterior Q waves  Lateral T wave inversions  Compared to ECG 2021 " 13:13:10  Sinus rhythm no longer present  Q waves and T wave inversions are unchanged from prior  Electronically Signed On 5- 20:26:50 PDT by TOMASZ DESHPANDE MD         LABS  Labs Reviewed   CBC WITH DIFFERENTIAL - Abnormal; Notable for the following components:       Result Value    MCHC 32.3 (*)     RDW 63.6 (*)     Neutrophils-Polys 76.30 (*)     Lymphocytes 12.00 (*)     Lymphs (Absolute) 0.58 (*)     All other components within normal limits    Narrative:     Indicate which anticoagulants the patient is on:->NONE   COMP METABOLIC PANEL - Abnormal; Notable for the following components:    Glucose 163 (*)     Total Protein 8.7 (*)     Globulin 5.0 (*)     All other components within normal limits    Narrative:     Indicate which anticoagulants the patient is on:->NONE   PROTHROMBIN TIME - Abnormal; Notable for the following components:    PT 16.0 (*)     INR 1.31 (*)     All other components within normal limits    Narrative:     Indicate which anticoagulants the patient is on:->NONE   PROBRAIN NATRIURETIC PEPTIDE, NT - Abnormal; Notable for the following components:    NT-proBNP 1123 (*)     All other components within normal limits    Narrative:     Indicate which anticoagulants the patient is on:->NONE   TROPONIN    Narrative:     Indicate which anticoagulants the patient is on:->NONE   LIPASE    Narrative:     Indicate which anticoagulants the patient is on:->NONE   APTT    Narrative:     Indicate which anticoagulants the patient is on:->NONE   TSH WITH REFLEX TO FT4    Narrative:     Indicate which anticoagulants the patient is on:->NONE   ESTIMATED GFR    Narrative:     Indicate which anticoagulants the patient is on:->NONE   SARS-COV-2, PCR (IN-HOUSE)    Narrative:     Have you been in close contact with a person who is suspected  or known to be positive for COVID-19 within the last 30 days  (e.g. last seen that person < 30 days ago)->Unknown   TROPONIN   CBC WITHOUT DIFFERENTIAL   BASIC METABOLIC PANEL    TROPONIN         RADIOLOGY  DX-CHEST-PORTABLE (1 VIEW)   Final Result      No radiographic evidence of acute cardiopulmonary process.      NM-CARDIAC STRESS TEST    (Results Pending)   EC-ECHOCARDIOGRAM LTD W/O CONT    (Results Pending)         COURSE & MEDICAL DECISION MAKING    Medications   digoxin (LANOXIN) tablet 125 mcg (has no administration in time range)   apixaban (ELIQUIS) tablet 5 mg (has no administration in time range)   finasteride (PROSCAR) tablet 5 mg (has no administration in time range)   hydroxyurea (HYDREA) capsule 500 mg (has no administration in time range)   metoprolol SR (TOPROL XL) tablet 25 mg (has no administration in time range)   tamsulosin (FLOMAX) capsule 0.4-0.8 mg (has no administration in time range)   senna-docusate (PERICOLACE or SENOKOT S) 8.6-50 MG per tablet 2 tablet (has no administration in time range)     And   polyethylene glycol/lytes (MIRALAX) PACKET 1 Packet (has no administration in time range)     And   magnesium hydroxide (MILK OF MAGNESIA) suspension 30 mL (has no administration in time range)     And   bisacodyl (DULCOLAX) suppository 10 mg (has no administration in time range)   aspirin (ASA) tablet 325 mg (has no administration in time range)     Or   aspirin (ASA) chewable tab 324 mg (has no administration in time range)     Or   aspirin (ASA) suppository 300 mg (has no administration in time range)   acetaminophen (Tylenol) tablet 650 mg (has no administration in time range)   enalaprilat (VASOTEC) injection 1.25 mg (has no administration in time range)   labetalol (NORMODYNE/TRANDATE) injection 10 mg (has no administration in time range)   ondansetron (ZOFRAN) syringe/vial injection 4 mg (4 mg Intravenous Given 5/28/21 2329)   ondansetron (ZOFRAN ODT) dispertab 4 mg (has no administration in time range)   insulin regular (HumuLIN R,NovoLIN R) injection (has no administration in time range)     And   glucose 4 g chewable tablet 16 g (has no administration in  "time range)     And   dextrose 50% (D50W) injection 50 mL (has no administration in time range)   fentaNYL (SUBLIMAZE) injection 50 mcg (50 mcg Intravenous Given 5/28/21 2104)       Pertinent Labs & Imaging studies reviewed. (See chart for details)  70 y.o. male presenting with generalized weakness and intermittent chest discomfort and multiple episodes of vomiting today.  He is concerned that he may be having a heart attack.  EKG upon arrival is not normal though does not appear to be acutely changed from his prior EKG 1 week ago when he was at his cardiology appointment.  The main difference is that today he does have atrial flutter/fib.  He does have a known history of paroxysmal atrial fibrillation and he is on chronic anticoagulation, Eliquis.    Laboratory studies were performed.  He does have elevated BNP though troponin is normal.  Lower suspicion for acute ischemia at this time though there is concern for possible ACS or worsening congestive heart failure.  Patient has a previous recent echocardiogram showing an EF of about 20%.    Laboratory studies are largely unremarkable other than BNP elevation.  No significant anemia or in the setting of the patient's known polycythemia, hemoglobin is normal.  No significant metabolic abnormalities.  No renal failure hepatic failure.  TSH is normal.  Chest x-ray does not show signs of pulmonary edema.  No lower extremity swelling.  No hypoxia.  Vital signs are largely unremarkable.  No further episodes of vomiting.  Patient was reevaluated at bedside and reports feeling improved overall.    Patient is scheduled to have outpatient echo and stress testing in the near future.  Given the patient's symptomatology today however, I think he would be best served by hospitalization for further work-up while admitted to the hospital.      BP (!) 193/97   Pulse (!) 59   Temp 35.9 °C (96.7 °F) (Temporal)   Resp 18   Ht 1.905 m (6' 3\")   SpO2 95%   BMI 23.75 kg/m² "       FINAL IMPRESSION  1. Chest pain, unspecified type    2. Generalized weakness            Electronically signed by: David Keith M.D., 5/28/2021 8:12 PM

## 2021-05-29 NOTE — PROGRESS NOTES
Telemetry Shift Summary     Rhythm SB, SR with BBB  HR Range 53-60 touched 48  Ectopy  o-fPVCs, rPACs  Measurements .20/.12/.40              Normal Values  Rhythm SR  HR Range    Measurements 0.12-0.20 / 0.06-0.10  / 0.30-0.52

## 2021-05-29 NOTE — PROGRESS NOTES
Nursing care plan includes knowledge deficit, potential for discomfort, potential for compromised cardiac output. POC includes teaching, comfort measures and reassurance, and access to code cart, cardiology stand by and availability of rapid response team. Pt verbalizes good understanding of benefits and risks of pharmacological cardiac stress test. Informed consent obtained. Lexiscan given, pt developed the following symptoms increased nausea, headache, and shortness of breath.Aminophyline 100 mg given IV for severe nausea. VS stable, major symptoms resolved. Sipping 7 UP continues to complain of nausea but states better after Aminophylline. Will return to room after second scan.

## 2021-05-29 NOTE — PROGRESS NOTES
2 RN skin check complete.  Devices in place (PIV and telemetry monitor).  Skin assessed under devices (intact).  Confirmed pressure ulcers found on (none).  New potential pressure ulcers noted on (none). Wound consult placed (not needed).  The following interventions in place: pt turns self and has pillows for support/positioning.    Additional: Patient skin WDL except for:  Erythema bilateral cheeks due to lupus, and closed wound to top of 2nd and 3rd toe left foot after stubbing and dislocating 2nd toe which is back in place now.

## 2021-05-29 NOTE — ASSESSMENT & PLAN NOTE
Associated with nausea and vomiting    Differentials include gastroenteritis,  gastroparesis or biliary stones/dysfunction   Bowel rest, advance diet as tolerated   Gastric emptying shows no gastroparesis       US shows Cholelithiasis, has inconsistent right upper quadrant tenderness        HIDA scan done- no need for further intervetions  Improved with PPI, GI cocktail, Carafate  Normal bili

## 2021-05-29 NOTE — ASSESSMENT & PLAN NOTE
With hyperglycemia  Last glycated hemoglobin was 7.1%  Short acting insulin  Accu-Checks, hypoglycemia protocol

## 2021-05-29 NOTE — ASSESSMENT & PLAN NOTE
With worsening over the last 2 weeks of fatigue  Intermittent chest pain  Stress test shows no evidence of reversible ischemia ejection fraction 19%

## 2021-05-29 NOTE — PROGRESS NOTES
Report received from JOSLYN Vargas. Dx, medications, O2 needs, and poc reviewed. Safety precautions in place and pt has no sign of distress or acute needs at this time. Care fully assumed. Will continue to monitor.

## 2021-05-29 NOTE — PROGRESS NOTES
Pt arrived to unit via gurney. Ambulated from gurney to bed, x1 handheld assist. Tele monitor applied, vitals taken. Pt assessed. A&O x4. Admit profile and med rec complete. Discussed POC with pt, including stress test and echocardiogram. Welcome folder provided and discussed. Communication board filled out. Questions and concerns addressed, verbalized understanding. Fall precautions in place. Pt demonstrates ability to use call light appropriately. Pt left in lowest position. Bed locked and bed alarm on.

## 2021-05-30 ENCOUNTER — APPOINTMENT (OUTPATIENT)
Dept: RADIOLOGY | Facility: MEDICAL CENTER | Age: 71
DRG: 391 | End: 2021-05-30
Attending: HOSPITALIST
Payer: MEDICARE

## 2021-05-30 LAB
ALBUMIN SERPL BCP-MCNC: 3.3 G/DL (ref 3.2–4.9)
ALBUMIN/GLOB SERPL: 0.8 G/DL
ALP SERPL-CCNC: 55 U/L (ref 30–99)
ALT SERPL-CCNC: 10 U/L (ref 2–50)
ANION GAP SERPL CALC-SCNC: 11 MMOL/L (ref 7–16)
ANISOCYTOSIS BLD QL SMEAR: ABNORMAL
AST SERPL-CCNC: 17 U/L (ref 12–45)
BASOPHILS # BLD AUTO: 2 % (ref 0–1.8)
BASOPHILS # BLD: 0.11 K/UL (ref 0–0.12)
BILIRUB SERPL-MCNC: 0.5 MG/DL (ref 0.1–1.5)
BUN SERPL-MCNC: 17 MG/DL (ref 8–22)
CALCIUM SERPL-MCNC: 9.1 MG/DL (ref 8.4–10.2)
CHLORIDE SERPL-SCNC: 103 MMOL/L (ref 96–112)
CO2 SERPL-SCNC: 23 MMOL/L (ref 20–33)
CREAT SERPL-MCNC: 0.96 MG/DL (ref 0.5–1.4)
CRP SERPL HS-MCNC: <0.3 MG/DL (ref 0–0.75)
EOSINOPHIL # BLD AUTO: 0.21 K/UL (ref 0–0.51)
EOSINOPHIL NFR BLD: 4 % (ref 0–6.9)
ERYTHROCYTE [DISTWIDTH] IN BLOOD BY AUTOMATED COUNT: 66.2 FL (ref 35.9–50)
ERYTHROCYTE [SEDIMENTATION RATE] IN BLOOD BY WESTERGREN METHOD: 30 MM/HOUR (ref 0–20)
GLOBULIN SER CALC-MCNC: 4.4 G/DL (ref 1.9–3.5)
GLUCOSE BLD-MCNC: 123 MG/DL (ref 65–99)
GLUCOSE BLD-MCNC: 132 MG/DL (ref 65–99)
GLUCOSE BLD-MCNC: 133 MG/DL (ref 65–99)
GLUCOSE BLD-MCNC: 240 MG/DL (ref 65–99)
GLUCOSE BLD-MCNC: 248 MG/DL (ref 65–99)
GLUCOSE SERPL-MCNC: 140 MG/DL (ref 65–99)
HCT VFR BLD AUTO: 47.6 % (ref 42–52)
HGB BLD-MCNC: 15 G/DL (ref 14–18)
LYMPHOCYTES # BLD AUTO: 0.27 K/UL (ref 1–4.8)
LYMPHOCYTES NFR BLD: 5 % (ref 22–41)
MACROCYTES BLD QL SMEAR: ABNORMAL
MAGNESIUM SERPL-MCNC: 1.9 MG/DL (ref 1.5–2.5)
MANUAL DIFF BLD: NORMAL
MCH RBC QN AUTO: 30.2 PG (ref 27–33)
MCHC RBC AUTO-ENTMCNC: 31.5 G/DL (ref 33.7–35.3)
MCV RBC AUTO: 95.8 FL (ref 81.4–97.8)
MICROCYTES BLD QL SMEAR: ABNORMAL
MONOCYTES # BLD AUTO: 0.27 K/UL (ref 0–0.85)
MONOCYTES NFR BLD AUTO: 5 % (ref 0–13.4)
NEUTROPHILS # BLD AUTO: 4.45 K/UL (ref 1.82–7.42)
NEUTROPHILS NFR BLD: 80 % (ref 44–72)
NEUTS BAND NFR BLD MANUAL: 4 % (ref 0–10)
NRBC # BLD AUTO: 0 K/UL
NRBC BLD-RTO: 0 /100 WBC
PHOSPHATE SERPL-MCNC: 3.2 MG/DL (ref 2.5–4.5)
PLATELET # BLD AUTO: 201 K/UL (ref 164–446)
PLATELET BLD QL SMEAR: NORMAL
PMV BLD AUTO: 12 FL (ref 9–12.9)
POTASSIUM SERPL-SCNC: 3.9 MMOL/L (ref 3.6–5.5)
PROT SERPL-MCNC: 7.7 G/DL (ref 6–8.2)
RBC # BLD AUTO: 4.97 M/UL (ref 4.7–6.1)
RBC BLD AUTO: PRESENT
SODIUM SERPL-SCNC: 137 MMOL/L (ref 135–145)
WBC # BLD AUTO: 5.3 K/UL (ref 4.8–10.8)

## 2021-05-30 PROCEDURE — 86140 C-REACTIVE PROTEIN: CPT

## 2021-05-30 PROCEDURE — 76700 US EXAM ABDOM COMPLETE: CPT

## 2021-05-30 PROCEDURE — 84100 ASSAY OF PHOSPHORUS: CPT

## 2021-05-30 PROCEDURE — 85027 COMPLETE CBC AUTOMATED: CPT

## 2021-05-30 PROCEDURE — 700105 HCHG RX REV CODE 258: Performed by: HOSPITALIST

## 2021-05-30 PROCEDURE — 96375 TX/PRO/DX INJ NEW DRUG ADDON: CPT

## 2021-05-30 PROCEDURE — 700111 HCHG RX REV CODE 636 W/ 250 OVERRIDE (IP): Performed by: INTERNAL MEDICINE

## 2021-05-30 PROCEDURE — 96376 TX/PRO/DX INJ SAME DRUG ADON: CPT

## 2021-05-30 PROCEDURE — 96372 THER/PROPH/DIAG INJ SC/IM: CPT

## 2021-05-30 PROCEDURE — 700102 HCHG RX REV CODE 250 W/ 637 OVERRIDE(OP): Performed by: INTERNAL MEDICINE

## 2021-05-30 PROCEDURE — 700102 HCHG RX REV CODE 250 W/ 637 OVERRIDE(OP): Performed by: HOSPITALIST

## 2021-05-30 PROCEDURE — 85007 BL SMEAR W/DIFF WBC COUNT: CPT

## 2021-05-30 PROCEDURE — 83735 ASSAY OF MAGNESIUM: CPT

## 2021-05-30 PROCEDURE — 85652 RBC SED RATE AUTOMATED: CPT

## 2021-05-30 PROCEDURE — 80053 COMPREHEN METABOLIC PANEL: CPT

## 2021-05-30 PROCEDURE — 770006 HCHG ROOM/CARE - MED/SURG/GYN SEMI*

## 2021-05-30 PROCEDURE — A9270 NON-COVERED ITEM OR SERVICE: HCPCS | Performed by: HOSPITALIST

## 2021-05-30 PROCEDURE — A9270 NON-COVERED ITEM OR SERVICE: HCPCS | Performed by: INTERNAL MEDICINE

## 2021-05-30 PROCEDURE — 99233 SBSQ HOSP IP/OBS HIGH 50: CPT | Performed by: HOSPITALIST

## 2021-05-30 PROCEDURE — 82962 GLUCOSE BLOOD TEST: CPT | Mod: 91

## 2021-05-30 RX ORDER — LORAZEPAM 2 MG/ML
0.5 INJECTION INTRAMUSCULAR EVERY 4 HOURS PRN
Status: DISCONTINUED | OUTPATIENT
Start: 2021-05-30 | End: 2021-06-03 | Stop reason: HOSPADM

## 2021-05-30 RX ORDER — TAMSULOSIN HYDROCHLORIDE 0.4 MG/1
0.4 CAPSULE ORAL EVERY EVENING
Status: DISCONTINUED | OUTPATIENT
Start: 2021-05-30 | End: 2021-06-03 | Stop reason: HOSPADM

## 2021-05-30 RX ORDER — SODIUM CHLORIDE 9 MG/ML
INJECTION, SOLUTION INTRAVENOUS CONTINUOUS
Status: DISCONTINUED | OUTPATIENT
Start: 2021-05-30 | End: 2021-06-03

## 2021-05-30 RX ADMIN — DOCUSATE SODIUM 50 MG AND SENNOSIDES 8.6 MG 2 TABLET: 8.6; 5 TABLET, FILM COATED ORAL at 06:07

## 2021-05-30 RX ADMIN — FINASTERIDE 5 MG: 5 TABLET, FILM COATED ORAL at 17:58

## 2021-05-30 RX ADMIN — APIXABAN 5 MG: 5 TABLET, FILM COATED ORAL at 06:08

## 2021-05-30 RX ADMIN — SODIUM CHLORIDE: 9 INJECTION, SOLUTION INTRAVENOUS at 22:28

## 2021-05-30 RX ADMIN — LORAZEPAM 0.5 MG: 2 INJECTION INTRAMUSCULAR; INTRAVENOUS at 01:38

## 2021-05-30 RX ADMIN — ONDANSETRON 4 MG: 2 INJECTION INTRAMUSCULAR; INTRAVENOUS at 20:51

## 2021-05-30 RX ADMIN — OXYCODONE HYDROCHLORIDE 10 MG: 10 TABLET ORAL at 20:59

## 2021-05-30 RX ADMIN — HYDROXYUREA 500 MG: 500 CAPSULE ORAL at 06:07

## 2021-05-30 RX ADMIN — ONDANSETRON 4 MG: 2 INJECTION INTRAMUSCULAR; INTRAVENOUS at 16:18

## 2021-05-30 RX ADMIN — INSULIN HUMAN 3 UNITS: 100 INJECTION, SOLUTION PARENTERAL at 21:01

## 2021-05-30 RX ADMIN — HYDROXYUREA 500 MG: 500 CAPSULE ORAL at 17:58

## 2021-05-30 RX ADMIN — OXYCODONE HYDROCHLORIDE 10 MG: 10 TABLET ORAL at 11:34

## 2021-05-30 RX ADMIN — METOPROLOL SUCCINATE 25 MG: 25 TABLET, EXTENDED RELEASE ORAL at 17:58

## 2021-05-30 RX ADMIN — ONDANSETRON 4 MG: 2 INJECTION INTRAMUSCULAR; INTRAVENOUS at 12:37

## 2021-05-30 RX ADMIN — DIGOXIN 125 MCG: 125 TABLET ORAL at 17:58

## 2021-05-30 RX ADMIN — APIXABAN 5 MG: 5 TABLET, FILM COATED ORAL at 17:58

## 2021-05-30 RX ADMIN — TAMSULOSIN HYDROCHLORIDE 0.4 MG: 0.4 CAPSULE ORAL at 17:58

## 2021-05-30 RX ADMIN — ONDANSETRON 4 MG: 2 INJECTION INTRAMUSCULAR; INTRAVENOUS at 06:07

## 2021-05-30 RX ADMIN — POLYETHYLENE GLYCOL 3350 1 PACKET: 17 POWDER, FOR SOLUTION ORAL at 09:18

## 2021-05-30 ASSESSMENT — ENCOUNTER SYMPTOMS
COUGH: 0
EYE REDNESS: 0
PALPITATIONS: 0
NAUSEA: 1
CONSTIPATION: 0
TINGLING: 0
LOSS OF CONSCIOUSNESS: 0
SPUTUM PRODUCTION: 0
MYALGIAS: 0
DEPRESSION: 0
ABDOMINAL PAIN: 1
FALLS: 0
DIZZINESS: 0
EYE DISCHARGE: 0
HEADACHES: 1
SHORTNESS OF BREATH: 0
STRIDOR: 0
DIARRHEA: 0

## 2021-05-30 ASSESSMENT — PAIN DESCRIPTION - PAIN TYPE
TYPE: CHRONIC PAIN

## 2021-05-30 ASSESSMENT — FIBROSIS 4 INDEX: FIB4 SCORE: 1.82

## 2021-05-30 NOTE — PROGRESS NOTES
Telemetry Shift Summary     Rhythm: SB/SR/BBB  Rate: 51-71  Measurements: 0.18/0.12/0.42  Ectopy (reported by Monitor Tech): rPVC/rPAC     Normal Values  Rhythm: Sinus  HR:   Measurements: 0.12-0.20/0.06-0.10/0.30-0.52

## 2021-05-30 NOTE — CARE PLAN
The patient is Stable - Low risk of patient condition declining or worsening    Shift Goals  Clinical Goals: Patient's pain will be managed so patient can sleep.    Progress made toward(s) clinical / shift goals:  Patient's pain has remained manage through the night. However patient experiences nausea shortly after receiving night time medications and received IV Zofran.    Patient is not progressing towards the following goals:Patient had increased nausea 2 hours after receiving Zofran. Patient received new order for ativan to decrease nausea.

## 2021-05-30 NOTE — PROGRESS NOTES
Patient began having nausea again after the dose of Zofran administered at 2238. Patient also had 10 beats of ventricular tachycardia. Spoke to Dr. Tolentino's about the patient's nausea and VT, order received for IV ativan to manage nausea.

## 2021-05-30 NOTE — PROGRESS NOTES
Bedside report received from Gamaliel JORGENSEN. Patient is in bed and stable. Bed alarm is on. Bed locked and in lowest position, upper side rails up, call light in reach, whiteboard updated. POC discussed and pt verbalizes understanding.

## 2021-05-30 NOTE — CARE PLAN
Problem: Pain - Standard  Goal: Alleviation of pain or a reduction in pain to the patient’s comfort goal  Outcome: Met     Problem: Knowledge Deficit - Standard  Goal: Patient and family/care givers will demonstrate understanding of plan of care, disease process/condition, diagnostic tests and medications  Outcome: Met   The patient is Stable - Low risk of patient condition declining or worsening    Shift Goals  Clinical Goals: Patient's will have BM this shift    Progress made toward(s) clinical / shift goals:  administered miralax per MAR,     Patient is not progressing towards the following goals:

## 2021-05-30 NOTE — PROGRESS NOTES
Patient experiencing nausea after receiving night time medications, relieved after administration of Zofran IV. Per patient he was started on metoprolol, aldactone, and digoxin about a month ago and that in the past he was taken off of metoprolol due to side effects causing him to feel unwell. The metoprolol was then started again at half the dose approximately 4 weeks ago. Digoxin level resulted showing patient does not have digoxin toxicity. Per patient he would like to speak to his attending doctor in the morning of if his metoprolol should be continued.

## 2021-05-30 NOTE — PROGRESS NOTES
[facility] Medicine Daily Progress Note    Date of Service  5/30/2021    Chief Complaint  70 y.o. male admitted 5/28/2021 with nausea vomiting abdominal pain    Hospital Course  70M, PMHx C-S-HF, PAfib, SLE, BPH, remote history of diabetes  Admitted 5/28 with nausea, chest & abd pain     Interval Problem Update  Heart rate 50s-60s saturating well on room air.  Chest pain resolved however patient still complaining of nausea and abdominal pain.  Reports long history of diabetes I will check gastric emptying study to rule out gastroparesis.  Stress test shows no evidence of reversible ischemia ejection fraction 19%  Blood pressure better this morning no longer having hypertensive urgency.  IVF if unable to have enough adequate oral intake   Discussed with patient, patient's nurse and with multidisciplinary team during rounds including , pharmacist and charge nurse.       Consultants/Specialty  None     Code Status  Full Code    Disposition  Anticipate medical clearance in 1 days    Review of Systems  Review of Systems   Constitutional: Positive for malaise/fatigue.   HENT: Negative for congestion.    Eyes: Negative for discharge and redness.   Respiratory: Negative for cough, sputum production, shortness of breath and stridor.    Cardiovascular: Negative for palpitations and leg swelling. Chest pain: Resolved.   Gastrointestinal: Positive for abdominal pain and nausea. Negative for constipation and diarrhea.   Genitourinary: Negative for dysuria and urgency.   Musculoskeletal: Negative for falls and myalgias.   Neurological: Positive for headaches. Negative for dizziness, tingling and loss of consciousness.   Psychiatric/Behavioral: Negative for depression and suicidal ideas.      Physical Exam  Temp:  [36.3 °C (97.4 °F)-36.9 °C (98.5 °F)] 36.9 °C (98.5 °F)  Pulse:  [52-69] 62  Resp:  [16-18] 18  BP: (134-151)/(71-84) 141/79  SpO2:  [94 %-97 %] 97 %    Physical Exam  Vitals and nursing note reviewed.    Constitutional:       General: He is not in acute distress.     Appearance: He is well-developed. He is not toxic-appearing or diaphoretic.   HENT:      Head: Normocephalic and atraumatic.      Right Ear: External ear normal.      Left Ear: External ear normal.      Nose: Nose normal. No congestion or rhinorrhea.      Mouth/Throat:      Mouth: Mucous membranes are dry.      Pharynx: No oropharyngeal exudate.   Eyes:      General:         Right eye: No discharge.         Left eye: No discharge.      Extraocular Movements: Extraocular movements intact.   Neck:      Trachea: No tracheal deviation.   Cardiovascular:      Rate and Rhythm: Regular rhythm. Bradycardia present.      Heart sounds: No murmur heard.   No friction rub. No gallop.    Pulmonary:      Effort: Pulmonary effort is normal.      Breath sounds: No stridor.   Abdominal:      General: Bowel sounds are normal. There is no distension.      Palpations: Abdomen is soft.      Tenderness: There is abdominal tenderness in the right lower quadrant and left lower quadrant.   Musculoskeletal:         General: Normal range of motion.      Cervical back: Normal range of motion and neck supple. No edema or erythema.      Right lower leg: Edema present.      Left lower leg: Edema present.   Lymphadenopathy:      Cervical: No cervical adenopathy.   Skin:     General: Skin is warm and dry.      Findings: No erythema or rash.   Neurological:      General: No focal deficit present.      Mental Status: He is alert and oriented to person, place, and time.      Cranial Nerves: No cranial nerve deficit.   Psychiatric:         Mood and Affect: Mood normal.       Fluids    Intake/Output Summary (Last 24 hours) at 5/30/2021 1530  Last data filed at 5/30/2021 0900  Gross per 24 hour   Intake 540 ml   Output 650 ml   Net -110 ml       Laboratory  Recent Labs     05/28/21 2022 05/29/21  0032 05/30/21  0323   WBC 4.8 6.1 5.3   RBC 5.08 5.03 4.97   HEMOGLOBIN 15.5 15.0 15.0    HEMATOCRIT 48.0 47.4 47.6   MCV 94.5 94.2 95.8   MCH 30.5 29.8 30.2   MCHC 32.3* 31.6* 31.5*   RDW 63.6* 64.5* 66.2*   PLATELETCT 236 211 201   MPV 12.1 11.9 12.0     Recent Labs     05/28/21 2022 05/29/21  0032 05/30/21  0323   SODIUM 136 136 137   POTASSIUM 4.0 4.3 3.9   CHLORIDE 101 104 103   CO2 23 22 23   GLUCOSE 163* 206* 140*   BUN 19 19 17   CREATININE 1.09 0.97 0.96   CALCIUM 9.4 9.2 9.1     Recent Labs     05/28/21 2128   APTT 29.9   INR 1.31*               Imaging  NM-CARDIAC STRESS TEST         EC-ECHOCARDIOGRAM LTD W/ CONT   Final Result      DX-CHEST-PORTABLE (1 VIEW)   Final Result      No radiographic evidence of acute cardiopulmonary process.      US-ABDOMEN COMPLETE SURVEY    (Results Pending)        Assessment/Plan  * AP (abdominal pain)- (present on admission)  Assessment & Plan  Associated with nausea and vomiting    Differentials include gastroenteritis,  gastroparesis or biliary stones/dysfunction  Bowel rest, advance diet as tolerated   Will check abd US, & gastric emptying study     Chronic systolic heart failure (HCC)- (present on admission)  Assessment & Plan  With worsening over the last 2 weeks of fatigue  Intermittent chest pain  Obtain stress test as well as echocardiogram  Appears close to the dry side however has mild mild lower extremity edema  Monitor volume status closely    Paroxysmal A-fib (HCC)- (present on admission)  Assessment & Plan  Currently sinus on metoprolol and digoxin  Will obtain digoxin level  Continue home Eliquis      Hypertensive urgency- (present on admission)  Assessment & Plan  Resume home metoprolol with hold parameters.  We will start labetalol for extreme hypertension    Type 2 diabetes mellitus with complication, without long-term current use of insulin (HCC)- (present on admission)  Assessment & Plan  With hyperglycemia  Last glycated hemoglobin was 7.1%  Short acting insulin  Accu-Checks, hypoglycemia protocol     Benign prostatic hyperplasia  without lower urinary tract symptoms- (present on admission)  Assessment & Plan  Continue home Proscar and Flomax  Monitor input output, check bladder scans    SLE (systemic lupus erythematosus related syndrome) (HCC)- (present on admission)  Assessment & Plan  Resume home meds     VTE prophylaxis: Apixaban

## 2021-05-30 NOTE — PROGRESS NOTES
Telemetry Strip     Strip printed: 1421  Measurements from am strip were as follows:  Rhythm: SB-SR with BBB  HR: 42-67  Measurements: 0.18/ 0.12/ 0.42  Ectopy: r-o PVC, r-o PAC, r coup             Normal Values  Rhythm SR  HR Range    Measurements 0.12-0.20 / 0.06-0.10  / 0.30-0.52

## 2021-05-30 NOTE — PROGRESS NOTES
Telemetry Shift Summary     Rhythm SB, SR with BBB  HR Range 52-74  Ectopy  rare PVCs, rare PACs, 10 beats VT  Measurements .20/.18/.44              Normal Values  Rhythm SR  HR Range    Measurements 0.12-0.20 / 0.06-0.10  / 0.30-0.52

## 2021-05-31 ENCOUNTER — APPOINTMENT (OUTPATIENT)
Dept: RADIOLOGY | Facility: MEDICAL CENTER | Age: 71
DRG: 391 | End: 2021-05-31
Attending: HOSPITALIST
Payer: MEDICARE

## 2021-05-31 PROBLEM — K80.20 CALCULUS OF GALLBLADDER: Status: ACTIVE | Noted: 2021-05-31

## 2021-05-31 LAB
ALBUMIN SERPL BCP-MCNC: 3.2 G/DL (ref 3.2–4.9)
ALBUMIN/GLOB SERPL: 0.7 G/DL
ALP SERPL-CCNC: 53 U/L (ref 30–99)
ALT SERPL-CCNC: 11 U/L (ref 2–50)
ANION GAP SERPL CALC-SCNC: 7 MMOL/L (ref 7–16)
AST SERPL-CCNC: 16 U/L (ref 12–45)
BASOPHILS # BLD AUTO: 0.9 % (ref 0–1.8)
BASOPHILS # BLD: 0.04 K/UL (ref 0–0.12)
BILIRUB SERPL-MCNC: 0.6 MG/DL (ref 0.1–1.5)
BUN SERPL-MCNC: 14 MG/DL (ref 8–22)
CALCIUM SERPL-MCNC: 9 MG/DL (ref 8.4–10.2)
CHLORIDE SERPL-SCNC: 104 MMOL/L (ref 96–112)
CO2 SERPL-SCNC: 24 MMOL/L (ref 20–33)
CREAT SERPL-MCNC: 0.95 MG/DL (ref 0.5–1.4)
EOSINOPHIL # BLD AUTO: 0.19 K/UL (ref 0–0.51)
EOSINOPHIL NFR BLD: 4.1 % (ref 0–6.9)
ERYTHROCYTE [DISTWIDTH] IN BLOOD BY AUTOMATED COUNT: 62.5 FL (ref 35.9–50)
GLOBULIN SER CALC-MCNC: 4.5 G/DL (ref 1.9–3.5)
GLUCOSE BLD-MCNC: 131 MG/DL (ref 65–99)
GLUCOSE BLD-MCNC: 156 MG/DL (ref 65–99)
GLUCOSE BLD-MCNC: 177 MG/DL (ref 65–99)
GLUCOSE BLD-MCNC: 179 MG/DL (ref 65–99)
GLUCOSE SERPL-MCNC: 117 MG/DL (ref 65–99)
HCT VFR BLD AUTO: 47.2 % (ref 42–52)
HGB BLD-MCNC: 15 G/DL (ref 14–18)
IMM GRANULOCYTES # BLD AUTO: 0.03 K/UL (ref 0–0.11)
IMM GRANULOCYTES NFR BLD AUTO: 0.6 % (ref 0–0.9)
LYMPHOCYTES # BLD AUTO: 0.39 K/UL (ref 1–4.8)
LYMPHOCYTES NFR BLD: 8.4 % (ref 22–41)
MAGNESIUM SERPL-MCNC: 1.9 MG/DL (ref 1.5–2.5)
MCH RBC QN AUTO: 29.6 PG (ref 27–33)
MCHC RBC AUTO-ENTMCNC: 31.8 G/DL (ref 33.7–35.3)
MCV RBC AUTO: 93.3 FL (ref 81.4–97.8)
MONOCYTES # BLD AUTO: 0.32 K/UL (ref 0–0.85)
MONOCYTES NFR BLD AUTO: 6.9 % (ref 0–13.4)
NEUTROPHILS # BLD AUTO: 3.65 K/UL (ref 1.82–7.42)
NEUTROPHILS NFR BLD: 79.1 % (ref 44–72)
NRBC # BLD AUTO: 0 K/UL
NRBC BLD-RTO: 0 /100 WBC
PHOSPHATE SERPL-MCNC: 3.4 MG/DL (ref 2.5–4.5)
PLATELET # BLD AUTO: 195 K/UL (ref 164–446)
PMV BLD AUTO: 11.2 FL (ref 9–12.9)
POTASSIUM SERPL-SCNC: 4 MMOL/L (ref 3.6–5.5)
PROT SERPL-MCNC: 7.7 G/DL (ref 6–8.2)
RBC # BLD AUTO: 5.06 M/UL (ref 4.7–6.1)
SODIUM SERPL-SCNC: 135 MMOL/L (ref 135–145)
WBC # BLD AUTO: 4.6 K/UL (ref 4.8–10.8)

## 2021-05-31 PROCEDURE — 84100 ASSAY OF PHOSPHORUS: CPT

## 2021-05-31 PROCEDURE — A9270 NON-COVERED ITEM OR SERVICE: HCPCS | Performed by: HOSPITALIST

## 2021-05-31 PROCEDURE — 99232 SBSQ HOSP IP/OBS MODERATE 35: CPT | Performed by: HOSPITALIST

## 2021-05-31 PROCEDURE — 770020 HCHG ROOM/CARE - TELE (206)

## 2021-05-31 PROCEDURE — 700111 HCHG RX REV CODE 636 W/ 250 OVERRIDE (IP): Performed by: INTERNAL MEDICINE

## 2021-05-31 PROCEDURE — 85025 COMPLETE CBC W/AUTO DIFF WBC: CPT

## 2021-05-31 PROCEDURE — 83735 ASSAY OF MAGNESIUM: CPT

## 2021-05-31 PROCEDURE — A9541 TC99M SULFUR COLLOID: HCPCS

## 2021-05-31 PROCEDURE — 82962 GLUCOSE BLOOD TEST: CPT | Mod: 91

## 2021-05-31 PROCEDURE — 700102 HCHG RX REV CODE 250 W/ 637 OVERRIDE(OP): Performed by: HOSPITALIST

## 2021-05-31 PROCEDURE — 700102 HCHG RX REV CODE 250 W/ 637 OVERRIDE(OP): Performed by: INTERNAL MEDICINE

## 2021-05-31 PROCEDURE — A9270 NON-COVERED ITEM OR SERVICE: HCPCS | Performed by: INTERNAL MEDICINE

## 2021-05-31 PROCEDURE — 80053 COMPREHEN METABOLIC PANEL: CPT

## 2021-05-31 RX ADMIN — APIXABAN 5 MG: 5 TABLET, FILM COATED ORAL at 17:58

## 2021-05-31 RX ADMIN — ONDANSETRON 4 MG: 2 INJECTION INTRAMUSCULAR; INTRAVENOUS at 19:46

## 2021-05-31 RX ADMIN — OXYCODONE HYDROCHLORIDE 10 MG: 10 TABLET ORAL at 12:10

## 2021-05-31 RX ADMIN — DOCUSATE SODIUM 50 MG AND SENNOSIDES 8.6 MG 2 TABLET: 8.6; 5 TABLET, FILM COATED ORAL at 05:41

## 2021-05-31 RX ADMIN — FINASTERIDE 5 MG: 5 TABLET, FILM COATED ORAL at 17:58

## 2021-05-31 RX ADMIN — APIXABAN 5 MG: 5 TABLET, FILM COATED ORAL at 05:41

## 2021-05-31 RX ADMIN — ONDANSETRON 4 MG: 4 TABLET, ORALLY DISINTEGRATING ORAL at 12:10

## 2021-05-31 RX ADMIN — DIGOXIN 125 MCG: 125 TABLET ORAL at 17:58

## 2021-05-31 RX ADMIN — TAMSULOSIN HYDROCHLORIDE 0.4 MG: 0.4 CAPSULE ORAL at 17:59

## 2021-05-31 RX ADMIN — OXYCODONE HYDROCHLORIDE 10 MG: 10 TABLET ORAL at 03:49

## 2021-05-31 RX ADMIN — INSULIN HUMAN 2 UNITS: 100 INJECTION, SOLUTION PARENTERAL at 05:45

## 2021-05-31 RX ADMIN — INSULIN HUMAN 2 UNITS: 100 INJECTION, SOLUTION PARENTERAL at 17:52

## 2021-05-31 RX ADMIN — DOCUSATE SODIUM 50 MG AND SENNOSIDES 8.6 MG 2 TABLET: 8.6; 5 TABLET, FILM COATED ORAL at 17:57

## 2021-05-31 RX ADMIN — HYDROXYUREA 500 MG: 500 CAPSULE ORAL at 05:42

## 2021-05-31 RX ADMIN — HYDROXYUREA 500 MG: 500 CAPSULE ORAL at 17:58

## 2021-05-31 ASSESSMENT — ENCOUNTER SYMPTOMS
SPUTUM PRODUCTION: 0
FALLS: 0
DIARRHEA: 0
NAUSEA: 1
TINGLING: 0
HEADACHES: 1
COUGH: 0
PALPITATIONS: 0
ABDOMINAL PAIN: 1
CONSTIPATION: 0
DIZZINESS: 0
STRIDOR: 0
EYE DISCHARGE: 0
SHORTNESS OF BREATH: 0
DEPRESSION: 0
LOSS OF CONSCIOUSNESS: 0
EYE REDNESS: 0
MYALGIAS: 0

## 2021-05-31 ASSESSMENT — FIBROSIS 4 INDEX: FIB4 SCORE: 1.82

## 2021-05-31 ASSESSMENT — PAIN DESCRIPTION - PAIN TYPE
TYPE: ACUTE PAIN
TYPE: CHRONIC PAIN
TYPE: ACUTE PAIN

## 2021-05-31 NOTE — ASSESSMENT & PLAN NOTE
Ultrasound shows evidence for cholelithiasis without sonographic evidence for acute cholecystitis  HIDA with slight low ejection fraction - no need for surgical intervention.

## 2021-05-31 NOTE — PROGRESS NOTES
Telemetry Shift Summary     Rhythm: SB/SR with BBB  HR: 51-68  Ectopy: r-o PVC, r-o PAC    Measurements: 0.20/0.12/0.40    Normal Values  Rhythm: SR  HR:   Measurements: 0.12-0.20/0.08-0.10/0.30-0.52

## 2021-05-31 NOTE — PROGRESS NOTES
"Report received from Hima at bedside, pt awake and requesting his oxycodone.  \"The pain is so bad.\"  Informed him that he could not have anything until 0749.    Reached out to [facility] and he is scheduled for 1000.  Let the pt know about 0810 of his time and that he needed to be npo as he will be eating eggs as well as he could not have narcotics or anti-nausea medications before the test and he became less anxious.   When discussing POC, pt asking when they would do the surgery as he had his eliquis this morning; will have to discuss this with doctor after we have results.      Transport at bedside about 0940 to take him for his test.   "

## 2021-05-31 NOTE — CARE PLAN
The patient is Stable - Low risk of patient condition declining or worsening    Shift Goals  Clinical Goals: Pain and nausea management  Patient Goals: Rest    Progress made toward(s) clinical / shift goals:  Pain and nausea medication administered per MAR.     Patient is not progressing towards the following goals:

## 2021-06-01 LAB
ALBUMIN SERPL BCP-MCNC: 3.2 G/DL (ref 3.2–4.9)
ALBUMIN/GLOB SERPL: 0.7 G/DL
ALP SERPL-CCNC: 56 U/L (ref 30–99)
ALT SERPL-CCNC: 12 U/L (ref 2–50)
ANION GAP SERPL CALC-SCNC: 12 MMOL/L (ref 7–16)
AST SERPL-CCNC: 25 U/L (ref 12–45)
BASOPHILS # BLD AUTO: 1.3 % (ref 0–1.8)
BASOPHILS # BLD: 0.06 K/UL (ref 0–0.12)
BILIRUB SERPL-MCNC: 0.6 MG/DL (ref 0.1–1.5)
BUN SERPL-MCNC: 15 MG/DL (ref 8–22)
CALCIUM SERPL-MCNC: 9.4 MG/DL (ref 8.4–10.2)
CHLORIDE SERPL-SCNC: 103 MMOL/L (ref 96–112)
CO2 SERPL-SCNC: 22 MMOL/L (ref 20–33)
CREAT SERPL-MCNC: 0.93 MG/DL (ref 0.5–1.4)
EOSINOPHIL # BLD AUTO: 0.16 K/UL (ref 0–0.51)
EOSINOPHIL NFR BLD: 3.4 % (ref 0–6.9)
ERYTHROCYTE [DISTWIDTH] IN BLOOD BY AUTOMATED COUNT: 64.5 FL (ref 35.9–50)
GLOBULIN SER CALC-MCNC: 4.9 G/DL (ref 1.9–3.5)
GLUCOSE BLD-MCNC: 147 MG/DL (ref 65–99)
GLUCOSE BLD-MCNC: 149 MG/DL (ref 65–99)
GLUCOSE BLD-MCNC: 218 MG/DL (ref 65–99)
GLUCOSE BLD-MCNC: 242 MG/DL (ref 65–99)
GLUCOSE SERPL-MCNC: 141 MG/DL (ref 65–99)
HCT VFR BLD AUTO: 48.8 % (ref 42–52)
HGB BLD-MCNC: 15.5 G/DL (ref 14–18)
IMM GRANULOCYTES # BLD AUTO: 0.05 K/UL (ref 0–0.11)
IMM GRANULOCYTES NFR BLD AUTO: 1.1 % (ref 0–0.9)
LYMPHOCYTES # BLD AUTO: 0.39 K/UL (ref 1–4.8)
LYMPHOCYTES NFR BLD: 8.2 % (ref 22–41)
MCH RBC QN AUTO: 30.4 PG (ref 27–33)
MCHC RBC AUTO-ENTMCNC: 31.8 G/DL (ref 33.7–35.3)
MCV RBC AUTO: 95.7 FL (ref 81.4–97.8)
MONOCYTES # BLD AUTO: 0.4 K/UL (ref 0–0.85)
MONOCYTES NFR BLD AUTO: 8.4 % (ref 0–13.4)
NEUTROPHILS # BLD AUTO: 3.68 K/UL (ref 1.82–7.42)
NEUTROPHILS NFR BLD: 77.6 % (ref 44–72)
NRBC # BLD AUTO: 0 K/UL
NRBC BLD-RTO: 0 /100 WBC
PLATELET # BLD AUTO: 198 K/UL (ref 164–446)
PMV BLD AUTO: 12.1 FL (ref 9–12.9)
POTASSIUM SERPL-SCNC: 4.3 MMOL/L (ref 3.6–5.5)
PROT SERPL-MCNC: 8.1 G/DL (ref 6–8.2)
RBC # BLD AUTO: 5.1 M/UL (ref 4.7–6.1)
SODIUM SERPL-SCNC: 137 MMOL/L (ref 135–145)
WBC # BLD AUTO: 4.7 K/UL (ref 4.8–10.8)

## 2021-06-01 PROCEDURE — 78452 HT MUSCLE IMAGE SPECT MULT: CPT | Mod: 26 | Performed by: INTERNAL MEDICINE

## 2021-06-01 PROCEDURE — 80053 COMPREHEN METABOLIC PANEL: CPT

## 2021-06-01 PROCEDURE — 85025 COMPLETE CBC W/AUTO DIFF WBC: CPT

## 2021-06-01 PROCEDURE — 82962 GLUCOSE BLOOD TEST: CPT | Mod: 91

## 2021-06-01 PROCEDURE — 700102 HCHG RX REV CODE 250 W/ 637 OVERRIDE(OP): Performed by: INTERNAL MEDICINE

## 2021-06-01 PROCEDURE — A9270 NON-COVERED ITEM OR SERVICE: HCPCS | Performed by: HOSPITALIST

## 2021-06-01 PROCEDURE — 700102 HCHG RX REV CODE 250 W/ 637 OVERRIDE(OP): Performed by: HOSPITALIST

## 2021-06-01 PROCEDURE — 99233 SBSQ HOSP IP/OBS HIGH 50: CPT | Performed by: INTERNAL MEDICINE

## 2021-06-01 PROCEDURE — A9270 NON-COVERED ITEM OR SERVICE: HCPCS | Performed by: INTERNAL MEDICINE

## 2021-06-01 PROCEDURE — 700111 HCHG RX REV CODE 636 W/ 250 OVERRIDE (IP): Performed by: INTERNAL MEDICINE

## 2021-06-01 PROCEDURE — 770020 HCHG ROOM/CARE - TELE (206)

## 2021-06-01 PROCEDURE — 93018 CV STRESS TEST I&R ONLY: CPT | Performed by: INTERNAL MEDICINE

## 2021-06-01 RX ORDER — OMEPRAZOLE 20 MG/1
20 CAPSULE, DELAYED RELEASE ORAL 2 TIMES DAILY
Status: DISCONTINUED | OUTPATIENT
Start: 2021-06-01 | End: 2021-06-03 | Stop reason: HOSPADM

## 2021-06-01 RX ADMIN — METOPROLOL SUCCINATE 25 MG: 25 TABLET, EXTENDED RELEASE ORAL at 17:35

## 2021-06-01 RX ADMIN — HYDROXYUREA 500 MG: 500 CAPSULE ORAL at 05:25

## 2021-06-01 RX ADMIN — HYDROXYUREA 500 MG: 500 CAPSULE ORAL at 17:36

## 2021-06-01 RX ADMIN — OXYCODONE HYDROCHLORIDE 10 MG: 10 TABLET ORAL at 08:26

## 2021-06-01 RX ADMIN — INSULIN HUMAN 3 UNITS: 100 INJECTION, SOLUTION PARENTERAL at 20:58

## 2021-06-01 RX ADMIN — APIXABAN 5 MG: 5 TABLET, FILM COATED ORAL at 17:34

## 2021-06-01 RX ADMIN — OXYCODONE HYDROCHLORIDE 10 MG: 10 TABLET ORAL at 19:54

## 2021-06-01 RX ADMIN — FINASTERIDE 5 MG: 5 TABLET, FILM COATED ORAL at 17:35

## 2021-06-01 RX ADMIN — ONDANSETRON 4 MG: 4 TABLET, ORALLY DISINTEGRATING ORAL at 11:50

## 2021-06-01 RX ADMIN — DIGOXIN 125 MCG: 125 TABLET ORAL at 17:36

## 2021-06-01 RX ADMIN — ONDANSETRON 4 MG: 4 TABLET, ORALLY DISINTEGRATING ORAL at 21:02

## 2021-06-01 RX ADMIN — OXYCODONE HYDROCHLORIDE 10 MG: 10 TABLET ORAL at 23:56

## 2021-06-01 RX ADMIN — OMEPRAZOLE 20 MG: 20 CAPSULE, DELAYED RELEASE ORAL at 17:35

## 2021-06-01 RX ADMIN — TAMSULOSIN HYDROCHLORIDE 0.4 MG: 0.4 CAPSULE ORAL at 17:34

## 2021-06-01 RX ADMIN — OXYCODONE HYDROCHLORIDE 10 MG: 10 TABLET ORAL at 15:25

## 2021-06-01 ASSESSMENT — ENCOUNTER SYMPTOMS
DIARRHEA: 0
SHORTNESS OF BREATH: 0
CLAUDICATION: 0
DEPRESSION: 0
ABDOMINAL PAIN: 1
SORE THROAT: 0
COUGH: 0
VOMITING: 0
INSOMNIA: 0
CONSTIPATION: 0
SPEECH CHANGE: 0
MYALGIAS: 0
NERVOUS/ANXIOUS: 0
SENSORY CHANGE: 0
HEADACHES: 1
CHILLS: 0
NAUSEA: 0
WEAKNESS: 0
BLURRED VISION: 0
FEVER: 0
HEARTBURN: 0
DIZZINESS: 0
PHOTOPHOBIA: 0

## 2021-06-01 ASSESSMENT — PAIN DESCRIPTION - PAIN TYPE
TYPE: ACUTE PAIN

## 2021-06-01 NOTE — PROGRESS NOTES
[facility] Medicine Daily Progress Note    Date of Service  6/1/2021    Chief Complaint  70 y.o. male admitted 5/28/2021 with nausea, vomiting and abdominal pain.    Hospital Course  70M, PMHx C-S-HF, PAfib, SLE, BPH, remote history of diabetes  Admitted 5/28 with nausea, chest & abd pain   Stress test shows no reversible ischemia  Gastric emptying study showed no gastroparesis  US shows Cholelithiasis, has inconsistent right upper quadrant tenderness   HIDA scan pending 6/2, need >48 hours between nuc med tests.      Interval Problem Update  Patient was NPO pending HIDA this am but needs to be delayed until tomorrow to lessen the radiation exposure.  Pain present in the epigastric region, no pain with palpation RUQ, suspect gastritis not cholecystitis.  Will start omeprazole.      Consultants/Specialty  none    Code Status  Full Code    Disposition  Home when appropriate.    Review of Systems  Review of Systems   Constitutional: Negative for chills and fever.   HENT: Negative for congestion and sore throat.    Eyes: Negative for blurred vision and photophobia.   Respiratory: Negative for cough and shortness of breath.    Cardiovascular: Negative for chest pain, claudication and leg swelling.   Gastrointestinal: Positive for abdominal pain (epigastric). Negative for constipation, diarrhea, heartburn, nausea and vomiting.   Genitourinary: Negative for dysuria and hematuria.   Musculoskeletal: Negative for joint pain and myalgias.   Skin: Negative for itching and rash.   Neurological: Positive for headaches. Negative for dizziness, sensory change, speech change and weakness.   Psychiatric/Behavioral: Negative for depression. The patient is not nervous/anxious and does not have insomnia.         Physical Exam  Temp:  [36.3 °C (97.4 °F)-36.9 °C (98.4 °F)] 36.9 °C (98.4 °F)  Pulse:  [52-80] 80  Resp:  [16-18] 17  BP: (106-145)/(76-90) 106/78  SpO2:  [93 %-99 %] 93 %    Physical Exam  Vitals and nursing note reviewed.    Constitutional:       General: He is not in acute distress.     Appearance: Normal appearance.   HENT:      Head: Normocephalic and atraumatic.   Eyes:      General: No scleral icterus.     Extraocular Movements: Extraocular movements intact.   Cardiovascular:      Rate and Rhythm: Normal rate and regular rhythm.      Pulses: Normal pulses.      Heart sounds: Normal heart sounds. No murmur heard.     Pulmonary:      Effort: Pulmonary effort is normal. No respiratory distress.      Breath sounds: Normal breath sounds. No wheezing, rhonchi or rales.   Abdominal:      General: Abdomen is flat. Bowel sounds are normal. There is no distension.      Palpations: Abdomen is soft.      Tenderness: There is abdominal tenderness (epigastric region, no RUQ tenderness). There is no rebound.   Musculoskeletal:         General: No swelling or tenderness.      Cervical back: Normal range of motion and neck supple.   Lymphadenopathy:      Cervical: No cervical adenopathy.   Skin:     Coloration: Skin is not jaundiced.      Findings: No erythema.   Neurological:      General: No focal deficit present.      Mental Status: He is alert and oriented to person, place, and time. Mental status is at baseline.      Cranial Nerves: No cranial nerve deficit.   Psychiatric:         Mood and Affect: Mood normal.         Behavior: Behavior normal.         Fluids    Intake/Output Summary (Last 24 hours) at 6/1/2021 1240  Last data filed at 6/1/2021 1100  Gross per 24 hour   Intake 300 ml   Output 1000 ml   Net -700 ml       Laboratory  Recent Labs     05/30/21  0323 05/31/21 0441 06/01/21  0442   WBC 5.3 4.6* 4.7*   RBC 4.97 5.06 5.10   HEMOGLOBIN 15.0 15.0 15.5   HEMATOCRIT 47.6 47.2 48.8   MCV 95.8 93.3 95.7   MCH 30.2 29.6 30.4   MCHC 31.5* 31.8* 31.8*   RDW 66.2* 62.5* 64.5*   PLATELETCT 201 195 198   MPV 12.0 11.2 12.1     Recent Labs     05/30/21  0323 05/31/21  0441 06/01/21  0442   SODIUM 137 135 137   POTASSIUM 3.9 4.0 4.3   CHLORIDE  103 104 103   CO2 23 24 22   GLUCOSE 140* 117* 141*   BUN 17 14 15   CREATININE 0.96 0.95 0.93   CALCIUM 9.1 9.0 9.4                   Imaging  NM-GASTRIC EMPTYING   Final Result      Normal gastric emptying scan.         US-ABDOMEN COMPLETE SURVEY   Final Result      1.  Hepatic steatosis.   2.  Cholelithiasis without sonographic evidence for acute cholecystitis.   3.  Splenomegaly.   4.  Prostatomegaly.            NM-CARDIAC STRESS TEST         EC-ECHOCARDIOGRAM LTD W/ CONT   Final Result      DX-CHEST-PORTABLE (1 VIEW)   Final Result      No radiographic evidence of acute cardiopulmonary process.      NM-BILIARY (HIDA) SCAN WITH CCK    (Results Pending)        Assessment/Plan  * AP (abdominal pain)- (present on admission)  Assessment & Plan  Associated with nausea and vomiting    Differentials include gastroenteritis,  gastroparesis or biliary stones/dysfunction   Bowel rest, advance diet as tolerated   Gastric emptying shows no gastroparesis       US shows Cholelithiasis, has inconsistent right upper quadrant tenderness I will check HIDA scan    Calculus of gallbladder- (present on admission)  Assessment & Plan  Ultrasound shows evidence for cholelithiasis without sonographic evidence for acute cholecystitis    Hypertensive urgency- (present on admission)  Assessment & Plan  Resume home metoprolol with hold parameters.  We will start labetalol for extreme hypertension    SLE (systemic lupus erythematosus related syndrome) (HCC)- (present on admission)  Assessment & Plan  Resume home meds    Type 2 diabetes mellitus with complication, without long-term current use of insulin (HCC)- (present on admission)  Assessment & Plan  With hyperglycemia  Last glycated hemoglobin was 7.1%  Short acting insulin  Accu-Checks, hypoglycemia protocol     Chronic systolic heart failure (HCC)- (present on admission)  Assessment & Plan  With worsening over the last 2 weeks of fatigue  Intermittent chest pain  Stress test shows no  evidence of reversible ischemia ejection fraction 19%    Paroxysmal A-fib (HCC)- (present on admission)  Assessment & Plan  Currently sinus on metoprolol and digoxin  Will obtain digoxin level  Continue home Eliquis      Benign prostatic hyperplasia without lower urinary tract symptoms- (present on admission)  Assessment & Plan  Continue home Proscar and Flomax  Monitor input output, check bladder scans         VTE prophylaxis: eliquis

## 2021-06-01 NOTE — PROGRESS NOTES
Telemetry Shift Summary     Rhythm: SB-SR with BBB  HR: 53-72  Ectopy: r-o PVC, r-o PAC, r Trig    Measurements: 0.16/0.12/0.40    Normal Values  Rhythm: SR  HR:   Measurements: 0.12-0.20/0.08-0.10/0.30-0.52

## 2021-06-01 NOTE — DISCHARGE PLANNING
Care Transition Team Assessment  ADRIANNAK/CISCO, Alysa Schulte, spouse, [phone]    RN TOSIN spoke with patient at the bedside to complete transition assessment and discuss DC planning.  Patient previously independent with all ADLs/IADLs.  He uses a cane at times, but not all the time.  He still drives himself to the doctor and the grocery store.  He has his wife and his daughter for support.  He uses the [facility] on [address] for his Rx needs.  He plans to discharge home with no needs when clear and states that his family will be able to pick him up.    DC Plan:  Home w/ no needs.      Information Source  Orientation Level: Oriented X4  Information Given By: Patient  Who is responsible for making decisions for patient? : Patient    Readmission Evaluation  Is this a readmission?: No    Elopement Risk  Legal Hold: No  Ambulatory or Self Mobile in Wheelchair: No-Not an Elopement Risk  Elopement Risk: Not at Risk for Elopement    Interdisciplinary Discharge Planning  Primary Care Physician: CHANTEL Meraz  Lives with - Patient's Self Care Capacity: Spouse  Patient or legal guardian wants to designate a caregiver: No  Support Systems: Spouse / Significant Other, Children  Housing / Facility: [address]  Do You Take your Prescribed Medications Regularly: Yes  Able to Return to Previous ADL's: Yes  Mobility Issues: No  Prior Services: None  Patient Prefers to be Discharged to:: home  Assistance Needed: No  Durable Medical Equipment: Other - Specify (Cane)    Discharge Preparedness  What is your plan after discharge?: Home with help  What are your discharge supports?: Child, Spouse  Prior Functional Level: Independent with Activities of Daily Living, Independent with Medication Management  Difficulity with ADLs: None  Difficulity with IADLs: None    Functional Assesment  Prior Functional Level: Independent with Activities of Daily Living, Independent with Medication Management    Finances  Financial Barriers to Discharge:  No  Prescription Coverage: Yes    Vision / Hearing Impairment  Vision Impairment : No  Hearing Impairment : No         Advance Directive  Advance Directive?: DPOA for Health Care  Durable Power of  Name and Contact : Alysa Schulte, [phone]    Domestic Abuse  Have you ever been the victim of abuse or violence?: No  Physical Abuse or Sexual Abuse: No  Verbal Abuse or Emotional Abuse: No  Possible Abuse/Neglect Reported to:: Not Applicable    Psychological Assessment  History of Substance Abuse: None  History of Psychiatric Problems: No  Non-compliant with Treatment: No  Newly Diagnosed Illness: No    Discharge Risks or Barriers  Discharge risks or barriers?: No    Anticipated Discharge Information  Discharge Disposition: Discharged to home/self care (01)  Discharge Address: [facility] [address]  Discharge Contact Phone Number: [phone]

## 2021-06-01 NOTE — CARE PLAN
The patient is Stable - Low risk of patient condition declining or worsening    Shift Goals  Clinical Goals: NPO at 0000 for HIDA scan  Patient Goals: Rest, decreased pain     Progress made toward(s) clinical / shift goals:  Heat packs used for pain, patient to be NPO at midnight.     Patient is not progressing towards the following goals:

## 2021-06-01 NOTE — CARE PLAN
The patient is Stable - Low risk of patient condition declining or worsening    Shift Goals  Clinical Goals: Shower, pain management  Patient Goals: Consult surgery, shower/bed bath, walk    Progress made toward(s) clinical / shift goals:  received orders for shower, able to resume oxy    Patient is not progressing towards the following goals: Need further testing before consulting surgery.

## 2021-06-01 NOTE — HOSPITAL COURSE
70M, PMHx C-S-HF, PAfib, SLE, BPH, remote history of diabetes  Admitted 5/28 with nausea, chest & abd pain   Stress test shows no reversible ischemia  Gastric emptying study showed no gastroparesis  US shows Cholelithiasis, has inconsistent right upper quadrant tenderness   HIDA scan pending 6/2, need >48 hours between nuc med tests.

## 2021-06-01 NOTE — PROGRESS NOTES
Tele strip at 1421 shows SR at 79.      Measurements from am strip were as follows:  NM=0.20  QRS=0.12 BBB  QT=0.40    Tele Shift Summary:    Rhythm : SB to SR  Rate : 59-83  Ectopy : Per CCT Viviana, pt had occasional PVCs, rare PACs and couplets.     Telemetry monitoring strips placed in pt's chart.

## 2021-06-01 NOTE — PROGRESS NOTES
Telemetry Shift Summary    Rhythm SR    HR Range 51-60  Ectopy Occasional PVC, Rare PAC  Measurements 0.20/0.12/0.44        Normal Values  Rhythm SR  HR Range    Measurements 0.12-0.20 / 0.06-0.10  / 0.30-0.52

## 2021-06-02 ENCOUNTER — APPOINTMENT (OUTPATIENT)
Dept: RADIOLOGY | Facility: MEDICAL CENTER | Age: 71
DRG: 391 | End: 2021-06-02
Attending: HOSPITALIST
Payer: MEDICARE

## 2021-06-02 LAB
ALBUMIN SERPL BCP-MCNC: 3.1 G/DL (ref 3.2–4.9)
ALBUMIN/GLOB SERPL: 0.7 G/DL
ALP SERPL-CCNC: 51 U/L (ref 30–99)
ALT SERPL-CCNC: 10 U/L (ref 2–50)
ANION GAP SERPL CALC-SCNC: 10 MMOL/L (ref 7–16)
AST SERPL-CCNC: 16 U/L (ref 12–45)
BILIRUB SERPL-MCNC: 0.6 MG/DL (ref 0.1–1.5)
BUN SERPL-MCNC: 16 MG/DL (ref 8–22)
CALCIUM SERPL-MCNC: 9.2 MG/DL (ref 8.4–10.2)
CHLORIDE SERPL-SCNC: 101 MMOL/L (ref 96–112)
CO2 SERPL-SCNC: 23 MMOL/L (ref 20–33)
CREAT SERPL-MCNC: 0.96 MG/DL (ref 0.5–1.4)
ERYTHROCYTE [DISTWIDTH] IN BLOOD BY AUTOMATED COUNT: 63.8 FL (ref 35.9–50)
GLOBULIN SER CALC-MCNC: 4.4 G/DL (ref 1.9–3.5)
GLUCOSE BLD-MCNC: 138 MG/DL (ref 65–99)
GLUCOSE BLD-MCNC: 187 MG/DL (ref 65–99)
GLUCOSE BLD-MCNC: 209 MG/DL (ref 65–99)
GLUCOSE BLD-MCNC: 222 MG/DL (ref 65–99)
GLUCOSE SERPL-MCNC: 124 MG/DL (ref 65–99)
HCT VFR BLD AUTO: 47.5 % (ref 42–52)
HGB BLD-MCNC: 14.9 G/DL (ref 14–18)
MCH RBC QN AUTO: 29.7 PG (ref 27–33)
MCHC RBC AUTO-ENTMCNC: 31.4 G/DL (ref 33.7–35.3)
MCV RBC AUTO: 94.8 FL (ref 81.4–97.8)
PLATELET # BLD AUTO: 212 K/UL (ref 164–446)
PMV BLD AUTO: 11.3 FL (ref 9–12.9)
POTASSIUM SERPL-SCNC: 4.2 MMOL/L (ref 3.6–5.5)
PROT SERPL-MCNC: 7.5 G/DL (ref 6–8.2)
RBC # BLD AUTO: 5.01 M/UL (ref 4.7–6.1)
SODIUM SERPL-SCNC: 134 MMOL/L (ref 135–145)
WBC # BLD AUTO: 4.2 K/UL (ref 4.8–10.8)

## 2021-06-02 PROCEDURE — 80053 COMPREHEN METABOLIC PANEL: CPT

## 2021-06-02 PROCEDURE — 770020 HCHG ROOM/CARE - TELE (206)

## 2021-06-02 PROCEDURE — 700102 HCHG RX REV CODE 250 W/ 637 OVERRIDE(OP): Performed by: HOSPITALIST

## 2021-06-02 PROCEDURE — 700111 HCHG RX REV CODE 636 W/ 250 OVERRIDE (IP): Performed by: INTERNAL MEDICINE

## 2021-06-02 PROCEDURE — A9270 NON-COVERED ITEM OR SERVICE: HCPCS | Performed by: INTERNAL MEDICINE

## 2021-06-02 PROCEDURE — 82962 GLUCOSE BLOOD TEST: CPT | Mod: 91

## 2021-06-02 PROCEDURE — 36415 COLL VENOUS BLD VENIPUNCTURE: CPT

## 2021-06-02 PROCEDURE — 700102 HCHG RX REV CODE 250 W/ 637 OVERRIDE(OP): Performed by: INTERNAL MEDICINE

## 2021-06-02 PROCEDURE — A9270 NON-COVERED ITEM OR SERVICE: HCPCS | Performed by: HOSPITALIST

## 2021-06-02 PROCEDURE — 85027 COMPLETE CBC AUTOMATED: CPT

## 2021-06-02 PROCEDURE — A9537 TC99M MEBROFENIN: HCPCS

## 2021-06-02 PROCEDURE — 99232 SBSQ HOSP IP/OBS MODERATE 35: CPT | Performed by: INTERNAL MEDICINE

## 2021-06-02 RX ORDER — SUCRALFATE ORAL 1 G/10ML
1 SUSPENSION ORAL EVERY 6 HOURS
Status: DISCONTINUED | OUTPATIENT
Start: 2021-06-02 | End: 2021-06-03 | Stop reason: HOSPADM

## 2021-06-02 RX ADMIN — SUCRALFATE 1 G: 1 SUSPENSION ORAL at 12:37

## 2021-06-02 RX ADMIN — INSULIN HUMAN 3 UNITS: 100 INJECTION, SOLUTION PARENTERAL at 12:40

## 2021-06-02 RX ADMIN — METOPROLOL SUCCINATE 25 MG: 25 TABLET, EXTENDED RELEASE ORAL at 17:37

## 2021-06-02 RX ADMIN — INSULIN HUMAN 2 UNITS: 100 INJECTION, SOLUTION PARENTERAL at 20:22

## 2021-06-02 RX ADMIN — OMEPRAZOLE 20 MG: 20 CAPSULE, DELAYED RELEASE ORAL at 17:36

## 2021-06-02 RX ADMIN — HYDROXYUREA 500 MG: 500 CAPSULE ORAL at 05:55

## 2021-06-02 RX ADMIN — LIDOCAINE HYDROCHLORIDE 30 ML: 20 SOLUTION OROPHARYNGEAL at 12:37

## 2021-06-02 RX ADMIN — SUCRALFATE 1 G: 1 SUSPENSION ORAL at 17:38

## 2021-06-02 RX ADMIN — APIXABAN 5 MG: 5 TABLET, FILM COATED ORAL at 05:55

## 2021-06-02 RX ADMIN — ONDANSETRON 4 MG: 2 INJECTION INTRAMUSCULAR; INTRAVENOUS at 08:30

## 2021-06-02 RX ADMIN — DOCUSATE SODIUM 50 MG AND SENNOSIDES 8.6 MG 2 TABLET: 8.6; 5 TABLET, FILM COATED ORAL at 17:37

## 2021-06-02 RX ADMIN — FINASTERIDE 5 MG: 5 TABLET, FILM COATED ORAL at 17:36

## 2021-06-02 RX ADMIN — HYDROXYUREA 500 MG: 500 CAPSULE ORAL at 17:36

## 2021-06-02 RX ADMIN — OXYCODONE HYDROCHLORIDE 10 MG: 10 TABLET ORAL at 17:35

## 2021-06-02 RX ADMIN — OXYCODONE HYDROCHLORIDE 10 MG: 10 TABLET ORAL at 07:34

## 2021-06-02 RX ADMIN — INSULIN HUMAN 2 UNITS: 100 INJECTION, SOLUTION PARENTERAL at 17:39

## 2021-06-02 RX ADMIN — TAMSULOSIN HYDROCHLORIDE 0.4 MG: 0.4 CAPSULE ORAL at 17:36

## 2021-06-02 RX ADMIN — OMEPRAZOLE 20 MG: 20 CAPSULE, DELAYED RELEASE ORAL at 05:55

## 2021-06-02 RX ADMIN — APIXABAN 5 MG: 5 TABLET, FILM COATED ORAL at 17:36

## 2021-06-02 RX ADMIN — DIGOXIN 125 MCG: 125 TABLET ORAL at 17:37

## 2021-06-02 ASSESSMENT — ENCOUNTER SYMPTOMS
FEVER: 0
NERVOUS/ANXIOUS: 0
HEADACHES: 1
WEAKNESS: 0
VOMITING: 0
SENSORY CHANGE: 0
BLURRED VISION: 0
DIZZINESS: 0
PHOTOPHOBIA: 0
CLAUDICATION: 0
INSOMNIA: 0
ABDOMINAL PAIN: 1
MYALGIAS: 0
CHILLS: 0
SHORTNESS OF BREATH: 0
DEPRESSION: 0
CONSTIPATION: 0
COUGH: 0
DIARRHEA: 0
HEARTBURN: 0
SPEECH CHANGE: 0
NAUSEA: 0
SORE THROAT: 0

## 2021-06-02 ASSESSMENT — PAIN DESCRIPTION - PAIN TYPE: TYPE: ACUTE PAIN

## 2021-06-02 NOTE — PROGRESS NOTES
Report received from Hima, pt sleeping during report.    Called [facility] med about 0730 and he is not scheduled to go down until after 1400.  Tech did say he could have his narcotics as well as give him a boost.  Informed pt of this and medicated with oxycodone and gave high calorie boost which he drank.  Within 45 minutes pt was calling for zofran; pulled all other fluids and made npo at this time.

## 2021-06-02 NOTE — PROGRESS NOTES
"[facility] Medicine Daily Progress Note    Date of Service  6/2/2021    Chief Complaint  70 y.o. male admitted 5/28/2021 with nausea, vomiting and abdominal pain.    Hospital Course  70M, PMHx C-S-HF, PAfib, SLE, BPH, remote history of diabetes  Admitted 5/28 with nausea, chest & abd pain   Stress test shows no reversible ischemia  Gastric emptying study showed no gastroparesis  US shows Cholelithiasis, has inconsistent right upper quadrant tenderness   HIDA scan pending 6/2, need >48 hours between nuc med tests.      Interval Problem Update  6/1 Patient was NPO pending HIDA this am but needs to be delayed until tomorrow to lessen the radiation exposure.  Pain present in the epigastric region, no pain with palpation RUQ, suspect gastritis not cholecystitis.  Will start omeprazole.    6/2 Patient seen without pain medication in system in preparation for HIDA scan today, states he feels \"horrible\" with both headache and abdominal pain.  No obstructive GB pathology based on labs, I anticipate a normal HIDA scan today.  Once able to take PO again, will also start GI cocktail and carafate.     Consultants/Specialty  none    Code Status  Full Code    Disposition  Home when appropriate.    Review of Systems  Review of Systems   Constitutional: Negative for chills and fever.   HENT: Negative for congestion and sore throat.    Eyes: Negative for blurred vision and photophobia.   Respiratory: Negative for cough and shortness of breath.    Cardiovascular: Negative for chest pain, claudication and leg swelling.   Gastrointestinal: Positive for abdominal pain (epigastric). Negative for constipation, diarrhea, heartburn, nausea and vomiting.   Genitourinary: Negative for dysuria and hematuria.   Musculoskeletal: Negative for joint pain and myalgias.   Skin: Negative for itching and rash.   Neurological: Positive for headaches. Negative for dizziness, sensory change, speech change and weakness.   Psychiatric/Behavioral: Negative for " depression. The patient is not nervous/anxious and does not have insomnia.         Physical Exam  Temp:  [36.3 °C (97.4 °F)-36.9 °C (98.4 °F)] 36.9 °C (98.4 °F)  Pulse:  [55-76] 55  Resp:  [15-16] 16  BP: (123-141)/(62-84) 133/72  SpO2:  [95 %-98 %] 97 %    Physical Exam  Vitals and nursing note reviewed.   Constitutional:       General: He is not in acute distress.     Appearance: Normal appearance.   HENT:      Head: Normocephalic and atraumatic.   Eyes:      General: No scleral icterus.     Extraocular Movements: Extraocular movements intact.   Cardiovascular:      Rate and Rhythm: Normal rate and regular rhythm.      Pulses: Normal pulses.      Heart sounds: Normal heart sounds. No murmur heard.     Pulmonary:      Effort: Pulmonary effort is normal. No respiratory distress.      Breath sounds: Normal breath sounds. No wheezing, rhonchi or rales.   Abdominal:      General: Abdomen is flat. Bowel sounds are normal. There is no distension.      Palpations: Abdomen is soft.      Tenderness: There is abdominal tenderness (epigastric region, no RUQ tenderness). There is no rebound.   Musculoskeletal:         General: No swelling or tenderness.      Cervical back: Normal range of motion and neck supple.   Lymphadenopathy:      Cervical: No cervical adenopathy.   Skin:     Coloration: Skin is not jaundiced.      Findings: No erythema.   Neurological:      General: No focal deficit present.      Mental Status: He is alert and oriented to person, place, and time. Mental status is at baseline.      Cranial Nerves: No cranial nerve deficit.   Psychiatric:         Mood and Affect: Mood normal.         Behavior: Behavior normal.         Fluids    Intake/Output Summary (Last 24 hours) at 6/2/2021 1157  Last data filed at 6/2/2021 0830  Gross per 24 hour   Intake 25 ml   Output 100 ml   Net -75 ml       Laboratory  Recent Labs     05/31/21  0441 06/01/21  0442 06/02/21  0435   WBC 4.6* 4.7* 4.2*   RBC 5.06 5.10 5.01    HEMOGLOBIN 15.0 15.5 14.9   HEMATOCRIT 47.2 48.8 47.5   MCV 93.3 95.7 94.8   MCH 29.6 30.4 29.7   MCHC 31.8* 31.8* 31.4*   RDW 62.5* 64.5* 63.8*   PLATELETCT 195 198 212   MPV 11.2 12.1 11.3     Recent Labs     05/31/21  0441 06/01/21  0442 06/02/21  0435   SODIUM 135 137 134*   POTASSIUM 4.0 4.3 4.2   CHLORIDE 104 103 101   CO2 24 22 23   GLUCOSE 117* 141* 124*   BUN 14 15 16   CREATININE 0.95 0.93 0.96   CALCIUM 9.0 9.4 9.2                   Imaging  NM-GASTRIC EMPTYING   Final Result      Normal gastric emptying scan.         US-ABDOMEN COMPLETE SURVEY   Final Result      1.  Hepatic steatosis.   2.  Cholelithiasis without sonographic evidence for acute cholecystitis.   3.  Splenomegaly.   4.  Prostatomegaly.            NM-CARDIAC STRESS TEST   Final Result      EC-ECHOCARDIOGRAM LTD W/ CONT   Final Result      DX-CHEST-PORTABLE (1 VIEW)   Final Result      No radiographic evidence of acute cardiopulmonary process.      NM-BILIARY (HIDA) SCAN WITH CCK    (Results Pending)        Assessment/Plan  * AP (abdominal pain)- (present on admission)  Assessment & Plan  Associated with nausea and vomiting    Differentials include gastroenteritis,  gastroparesis or biliary stones/dysfunction   Bowel rest, advance diet as tolerated   Gastric emptying shows no gastroparesis       US shows Cholelithiasis, has inconsistent right upper quadrant tenderness        HIDA scan today  Normal bili    Calculus of gallbladder- (present on admission)  Assessment & Plan  Ultrasound shows evidence for cholelithiasis without sonographic evidence for acute cholecystitis    Hypertensive urgency- (present on admission)  Assessment & Plan  Resume home metoprolol with hold parameters.  We will start labetalol for extreme hypertension    SLE (systemic lupus erythematosus related syndrome) (HCC)- (present on admission)  Assessment & Plan  Resume home meds    Type 2 diabetes mellitus with complication, without long-term current use of insulin  ([facility])- (present on admission)  Assessment & Plan  With hyperglycemia  Last glycated hemoglobin was 7.1%  Short acting insulin  Accu-Checks, hypoglycemia protocol     Chronic systolic heart failure ([facility])- (present on admission)  Assessment & Plan  With worsening over the last 2 weeks of fatigue  Intermittent chest pain  Stress test shows no evidence of reversible ischemia ejection fraction 19%    Paroxysmal A-fib ([facility])- (present on admission)  Assessment & Plan  Currently sinus on metoprolol and digoxin  Will obtain digoxin level  Continue home Eliquis      Benign prostatic hyperplasia without lower urinary tract symptoms- (present on admission)  Assessment & Plan  Continue home Proscar and Flomax  Monitor input output, check bladder scans       VTE prophylaxis: eliquis

## 2021-06-02 NOTE — PROGRESS NOTES
Telemetry Shift Summary     Rhythm: SB/SR with BBB  HR: 57-91  Ectopy: r-o PVC, r-o PAC, r Trig    Measurements: 0.20/0.12/0.40    Normal Values  Rhythm: SR  HR:   Measurements: 0.12-0.20/0.08-0.10/0.30-0.52

## 2021-06-02 NOTE — CARE PLAN
The patient is Stable - Low risk of patient condition declining or worsening    Shift Goals  Clinical Goals: Pain management, NPO at 0000  Patient Goals: Rest, pain management    Progress made toward(s) clinical / shift goals:  Administered medications per MAR, heat packs applied.     Patient is not progressing towards the following goals:

## 2021-06-02 NOTE — DOCUMENTATION QUERY
"                                                                         [facility]                                                                       Query Response Note      PATIENT:               MARZENA ROMEO  ACCT #:                  [id number]  MRN:                     [id number]  :                      1950  ADMIT DATE:       2021 8:06 PM  DISCH DATE:          RESPONDING  PROVIDER #:        [id number]           QUERY TEXT:    Altered mental status with headache and disorientation is documented in the H&P and the ED provider note. Can a more specific diagnosis be provided?    NOTE:  If the appropriate response is not listed below, please respond with a new note.    The patient's Clinical Indicators include:  - Findings:  Per H&P 21: Chief complaint: altered mental status.  headache and disorientation.    Once he began vomiting his family noted some confusion and he classified it as \"disorientation\".   I feel he is slightly dehydrated causing his disorientation however with his history of heart failure  - ED provider note:  Altered Mental Status. Reports feeling \"disoriented, it got really bad about 2 hours ago\". Reports also headache.  - chest pain and short of breath with vomiting, patient reports \"I think I'm having a heart attack, it's all in my records\".  - hypertensive urgency, DM2 with hyperglycemia     - Treatments:   avoid ongoing IVF, hold home spironolactone, symptomatic care, check digoxin levels, stress test, echocardiogram, metoprolol, insulin, gastric emptying study, HIDA scan    - Risk factors:  gastritis, heart failure, DM, hypertensive urgency, systemic lupus, cholelithiasis, dehydration    Thank You,  Maggie Perry RN  Clinical Documentation   [email or web address]  Connect via [employer]  Options provided:   -- Acute metabolic encephalopathy   -- Acute toxic encephalopathy   -- Acute encephalopathy due to other medical condition, (please specify other " medical condition)   -- Delirium due to general medical condition   -- Delirium due to multiple etiologies   -- Delirium due to other known physiological condition, (please specify the known physiological condition)   -- Delirium of unknown etiology   -- Unable to determine      Query created by: Maggie Perry on 6/2/2021 9:57 AM    RESPONSE TEXT:    Acute metabolic encephalopathy          Electronically signed by:  SHAYNE GUZMAN DO 6/2/2021 11:47 AM

## 2021-06-03 ENCOUNTER — APPOINTMENT (OUTPATIENT)
Dept: MEDICAL GROUP | Facility: MEDICAL CENTER | Age: 71
End: 2021-06-03
Payer: MEDICARE

## 2021-06-03 VITALS
RESPIRATION RATE: 17 BRPM | SYSTOLIC BLOOD PRESSURE: 132 MMHG | WEIGHT: 222.66 LBS | OXYGEN SATURATION: 96 % | TEMPERATURE: 97.8 F | HEART RATE: 71 BPM | HEIGHT: 75 IN | DIASTOLIC BLOOD PRESSURE: 73 MMHG | BODY MASS INDEX: 27.69 KG/M2

## 2021-06-03 LAB
ANION GAP SERPL CALC-SCNC: 11 MMOL/L (ref 7–16)
BUN SERPL-MCNC: 16 MG/DL (ref 8–22)
CALCIUM SERPL-MCNC: 9.1 MG/DL (ref 8.4–10.2)
CHLORIDE SERPL-SCNC: 101 MMOL/L (ref 96–112)
CO2 SERPL-SCNC: 23 MMOL/L (ref 20–33)
CREAT SERPL-MCNC: 0.88 MG/DL (ref 0.5–1.4)
ERYTHROCYTE [DISTWIDTH] IN BLOOD BY AUTOMATED COUNT: 63 FL (ref 35.9–50)
GLUCOSE BLD-MCNC: 135 MG/DL (ref 65–99)
GLUCOSE BLD-MCNC: 203 MG/DL (ref 65–99)
GLUCOSE SERPL-MCNC: 147 MG/DL (ref 65–99)
HCT VFR BLD AUTO: 47.1 % (ref 42–52)
HGB BLD-MCNC: 14.9 G/DL (ref 14–18)
MCH RBC QN AUTO: 29.7 PG (ref 27–33)
MCHC RBC AUTO-ENTMCNC: 31.6 G/DL (ref 33.7–35.3)
MCV RBC AUTO: 94 FL (ref 81.4–97.8)
PLATELET # BLD AUTO: 205 K/UL (ref 164–446)
PMV BLD AUTO: 12.1 FL (ref 9–12.9)
POTASSIUM SERPL-SCNC: 4 MMOL/L (ref 3.6–5.5)
RBC # BLD AUTO: 5.01 M/UL (ref 4.7–6.1)
SODIUM SERPL-SCNC: 135 MMOL/L (ref 135–145)
WBC # BLD AUTO: 4.4 K/UL (ref 4.8–10.8)

## 2021-06-03 PROCEDURE — 85027 COMPLETE CBC AUTOMATED: CPT

## 2021-06-03 PROCEDURE — A9270 NON-COVERED ITEM OR SERVICE: HCPCS | Performed by: INTERNAL MEDICINE

## 2021-06-03 PROCEDURE — 700102 HCHG RX REV CODE 250 W/ 637 OVERRIDE(OP): Performed by: HOSPITALIST

## 2021-06-03 PROCEDURE — 99239 HOSP IP/OBS DSCHRG MGMT >30: CPT | Performed by: INTERNAL MEDICINE

## 2021-06-03 PROCEDURE — 700102 HCHG RX REV CODE 250 W/ 637 OVERRIDE(OP): Performed by: INTERNAL MEDICINE

## 2021-06-03 PROCEDURE — 80048 BASIC METABOLIC PNL TOTAL CA: CPT

## 2021-06-03 PROCEDURE — 700105 HCHG RX REV CODE 258: Performed by: HOSPITALIST

## 2021-06-03 PROCEDURE — 82962 GLUCOSE BLOOD TEST: CPT

## 2021-06-03 PROCEDURE — A9270 NON-COVERED ITEM OR SERVICE: HCPCS | Performed by: HOSPITALIST

## 2021-06-03 PROCEDURE — 36415 COLL VENOUS BLD VENIPUNCTURE: CPT

## 2021-06-03 PROCEDURE — 700111 HCHG RX REV CODE 636 W/ 250 OVERRIDE (IP): Performed by: INTERNAL MEDICINE

## 2021-06-03 RX ORDER — SUCRALFATE ORAL 1 G/10ML
1 SUSPENSION ORAL
Qty: 414 ML | Refills: 0 | Status: SHIPPED | OUTPATIENT
Start: 2021-06-03 | End: 2021-06-30

## 2021-06-03 RX ORDER — ONDANSETRON 4 MG/1
4 TABLET, FILM COATED ORAL EVERY 4 HOURS PRN
Qty: 30 TABLET | Refills: 1 | Status: SHIPPED | OUTPATIENT
Start: 2021-06-03 | End: 2021-06-03 | Stop reason: SDUPTHER

## 2021-06-03 RX ORDER — OMEPRAZOLE 20 MG/1
20 CAPSULE, DELAYED RELEASE ORAL 2 TIMES DAILY
Qty: 60 CAPSULE | Refills: 2 | Status: ON HOLD | OUTPATIENT
Start: 2021-06-03 | End: 2024-01-01 | Stop reason: SDUPTHER

## 2021-06-03 RX ORDER — ONDANSETRON 4 MG/1
4 TABLET, FILM COATED ORAL EVERY 4 HOURS PRN
Qty: 30 TABLET | Refills: 1 | Status: SHIPPED | OUTPATIENT
Start: 2021-06-03 | End: 2024-01-01 | Stop reason: SDUPTHER

## 2021-06-03 RX ORDER — SUCRALFATE ORAL 1 G/10ML
1 SUSPENSION ORAL
Qty: 414 ML | Refills: 0 | Status: SHIPPED | OUTPATIENT
Start: 2021-06-03 | End: 2021-06-03

## 2021-06-03 RX ORDER — OMEPRAZOLE 20 MG/1
20 CAPSULE, DELAYED RELEASE ORAL 2 TIMES DAILY
Qty: 60 CAPSULE | Refills: 2 | Status: SHIPPED | OUTPATIENT
Start: 2021-06-03 | End: 2021-06-03

## 2021-06-03 RX ADMIN — SODIUM CHLORIDE: 9 INJECTION, SOLUTION INTRAVENOUS at 06:39

## 2021-06-03 RX ADMIN — ONDANSETRON 4 MG: 4 TABLET, ORALLY DISINTEGRATING ORAL at 10:04

## 2021-06-03 RX ADMIN — OXYCODONE HYDROCHLORIDE 10 MG: 10 TABLET ORAL at 00:21

## 2021-06-03 RX ADMIN — OXYCODONE HYDROCHLORIDE 10 MG: 10 TABLET ORAL at 10:03

## 2021-06-03 RX ADMIN — DOCUSATE SODIUM 50 MG AND SENNOSIDES 8.6 MG 2 TABLET: 8.6; 5 TABLET, FILM COATED ORAL at 10:03

## 2021-06-03 RX ADMIN — HYDROXYUREA 500 MG: 500 CAPSULE ORAL at 10:07

## 2021-06-03 RX ADMIN — INSULIN HUMAN 2 UNITS: 100 INJECTION, SOLUTION PARENTERAL at 11:59

## 2021-06-03 RX ADMIN — SUCRALFATE 1 G: 1 SUSPENSION ORAL at 11:58

## 2021-06-03 RX ADMIN — APIXABAN 5 MG: 5 TABLET, FILM COATED ORAL at 10:07

## 2021-06-03 RX ADMIN — SUCRALFATE 1 G: 1 SUSPENSION ORAL at 00:15

## 2021-06-03 ASSESSMENT — ENCOUNTER SYMPTOMS
COUGH: 0
HEARTBURN: 0
HEADACHES: 1
SORE THROAT: 0
FEVER: 0
CONSTIPATION: 0
BLURRED VISION: 0
CHILLS: 0
SENSORY CHANGE: 0
WEAKNESS: 0
MYALGIAS: 0
DIARRHEA: 0
INSOMNIA: 0
NERVOUS/ANXIOUS: 0
NAUSEA: 0
PHOTOPHOBIA: 0
DEPRESSION: 0
CLAUDICATION: 0
SHORTNESS OF BREATH: 0
ABDOMINAL PAIN: 1
DIZZINESS: 0
VOMITING: 0
SPEECH CHANGE: 0

## 2021-06-03 ASSESSMENT — COGNITIVE AND FUNCTIONAL STATUS - GENERAL
MOVING FROM LYING ON BACK TO SITTING ON SIDE OF FLAT BED: A LITTLE
CLIMB 3 TO 5 STEPS WITH RAILING: A LITTLE
EATING MEALS: A LITTLE
DRESSING REGULAR LOWER BODY CLOTHING: A LITTLE
TURNING FROM BACK TO SIDE WHILE IN FLAT BAD: A LITTLE
SUGGESTED CMS G CODE MODIFIER DAILY ACTIVITY: CK
SUGGESTED CMS G CODE MODIFIER MOBILITY: CK
DAILY ACTIVITIY SCORE: 18
WALKING IN HOSPITAL ROOM: A LITTLE
PERSONAL GROOMING: A LITTLE
TOILETING: A LITTLE
HELP NEEDED FOR BATHING: A LITTLE
STANDING UP FROM CHAIR USING ARMS: A LITTLE
MOBILITY SCORE: 18
DRESSING REGULAR UPPER BODY CLOTHING: A LITTLE
MOVING TO AND FROM BED TO CHAIR: A LITTLE

## 2021-06-03 ASSESSMENT — PAIN DESCRIPTION - PAIN TYPE
TYPE: ACUTE PAIN

## 2021-06-03 ASSESSMENT — PATIENT HEALTH QUESTIONNAIRE - PHQ9
1. LITTLE INTEREST OR PLEASURE IN DOING THINGS: NOT AT ALL
SUM OF ALL RESPONSES TO PHQ9 QUESTIONS 1 AND 2: 0

## 2021-06-03 NOTE — PROGRESS NOTES
"[facility] Medicine Daily Progress Note    Date of Service  6/3/2021    Chief Complaint  70 y.o. male admitted 5/28/2021 with nausea, vomiting and abdominal pain.    Hospital Course  70M, PMHx C-S-HF, PAfib, SLE, BPH, remote history of diabetes  Admitted 5/28 with nausea, chest & abd pain   Stress test shows no reversible ischemia  Gastric emptying study showed no gastroparesis  US shows Cholelithiasis, has inconsistent right upper quadrant tenderness   HIDA scan pending 6/2, need >48 hours between nuc med tests.      Interval Problem Update  6/1 Patient was NPO pending HIDA this am but needs to be delayed until tomorrow to lessen the radiation exposure.  Pain present in the epigastric region, no pain with palpation RUQ, suspect gastritis not cholecystitis.  Will start omeprazole.    6/2 Patient seen without pain medication in system in preparation for HIDA scan today, states he feels \"horrible\" with both headache and abdominal pain.  No obstructive GB pathology based on labs, I anticipate a normal HIDA scan today.  Once able to take PO again, will also start GI cocktail and carafate.   6/3 Patient feeling much better with interventions started yesterday, wanting a solid diet and feels he can tolerate it.  GI soft diet initiated.  If he continues to make improvements, I anticipate discharge home tomorrow am.     Consultants/Specialty  none    Code Status  Full Code    Disposition  Home when appropriate.    Review of Systems  Review of Systems   Constitutional: Negative for chills and fever.   HENT: Negative for congestion and sore throat.    Eyes: Negative for blurred vision and photophobia.   Respiratory: Negative for cough and shortness of breath.    Cardiovascular: Negative for chest pain, claudication and leg swelling.   Gastrointestinal: Positive for abdominal pain (epigastric - much improved). Negative for constipation, diarrhea, heartburn, nausea and vomiting.   Genitourinary: Negative for dysuria and " hematuria.   Musculoskeletal: Negative for joint pain and myalgias.   Skin: Negative for itching and rash.   Neurological: Positive for headaches. Negative for dizziness, sensory change, speech change and weakness.   Psychiatric/Behavioral: Negative for depression. The patient is not nervous/anxious and does not have insomnia.         Physical Exam  Temp:  [36.5 °C (97.7 °F)-36.6 °C (97.9 °F)] 36.6 °C (97.8 °F)  Pulse:  [61-71] 71  Resp:  [17-18] 17  BP: (127-134)/(66-84) 132/73  SpO2:  [92 %-97 %] 96 %    Physical Exam  Vitals and nursing note reviewed.   Constitutional:       General: He is not in acute distress.     Appearance: Normal appearance.   HENT:      Head: Normocephalic and atraumatic.   Eyes:      General: No scleral icterus.     Extraocular Movements: Extraocular movements intact.   Cardiovascular:      Rate and Rhythm: Normal rate and regular rhythm.      Pulses: Normal pulses.      Heart sounds: Normal heart sounds. No murmur heard.     Pulmonary:      Effort: Pulmonary effort is normal. No respiratory distress.      Breath sounds: Normal breath sounds. No wheezing, rhonchi or rales.   Abdominal:      General: Abdomen is flat. Bowel sounds are normal. There is no distension.      Palpations: Abdomen is soft.      Tenderness: There is no abdominal tenderness. There is no rebound.   Musculoskeletal:         General: No swelling or tenderness.      Cervical back: Normal range of motion and neck supple.   Lymphadenopathy:      Cervical: No cervical adenopathy.   Skin:     Coloration: Skin is not jaundiced.      Findings: No erythema.   Neurological:      General: No focal deficit present.      Mental Status: He is alert and oriented to person, place, and time. Mental status is at baseline.      Cranial Nerves: No cranial nerve deficit.   Psychiatric:         Mood and Affect: Mood normal.         Behavior: Behavior normal.         Fluids  No intake or output data in the 24 hours ending 06/03/21  1125    Laboratory  Recent Labs     06/01/21  0442 06/02/21  0435 06/03/21  0335   WBC 4.7* 4.2* 4.4*   RBC 5.10 5.01 5.01   HEMOGLOBIN 15.5 14.9 14.9   HEMATOCRIT 48.8 47.5 47.1   MCV 95.7 94.8 94.0   MCH 30.4 29.7 29.7   MCHC 31.8* 31.4* 31.6*   RDW 64.5* 63.8* 63.0*   PLATELETCT 198 212 205   MPV 12.1 11.3 12.1     Recent Labs     06/01/21 0442 06/02/21  0435 06/03/21  0335   SODIUM 137 134* 135   POTASSIUM 4.3 4.2 4.0   CHLORIDE 103 101 101   CO2 22 23 23   GLUCOSE 141* 124* 147*   BUN 15 16 16   CREATININE 0.93 0.96 0.88   CALCIUM 9.4 9.2 9.1                   Imaging  NM-BILIARY (HIDA) SCAN WITH CCK   Final Result      1.  Slightly low ejection fraction calculated for the gallbladder. Otherwise unremarkable HIDA scan.      Normal gallbladder ejection fraction is 38% or greater.      NM-GASTRIC EMPTYING   Final Result      Normal gastric emptying scan.         US-ABDOMEN COMPLETE SURVEY   Final Result      1.  Hepatic steatosis.   2.  Cholelithiasis without sonographic evidence for acute cholecystitis.   3.  Splenomegaly.   4.  Prostatomegaly.            NM-CARDIAC STRESS TEST   Final Result      EC-ECHOCARDIOGRAM LTD W/ CONT   Final Result      DX-CHEST-PORTABLE (1 VIEW)   Final Result      No radiographic evidence of acute cardiopulmonary process.           Assessment/Plan  * AP (abdominal pain)- (present on admission)  Assessment & Plan  Associated with nausea and vomiting    Differentials include gastroenteritis,  gastroparesis or biliary stones/dysfunction   Bowel rest, advance diet as tolerated   Gastric emptying shows no gastroparesis       US shows Cholelithiasis, has inconsistent right upper quadrant tenderness        HIDA scan done- no need for further intervetions  Improved with PPI, GI cocktail, Carafate  Normal bili    Calculus of gallbladder- (present on admission)  Assessment & Plan  Ultrasound shows evidence for cholelithiasis without sonographic evidence for acute cholecystitis  HIDA with  slight low ejection fraction - no need for surgical intervention.      Hypertensive urgency- (present on admission)  Assessment & Plan  Resume home metoprolol with hold parameters.  We will start labetalol for extreme hypertension    SLE (systemic lupus erythematosus related syndrome) ([facility])- (present on admission)  Assessment & Plan  Resume home meds    Type 2 diabetes mellitus with complication, without long-term current use of insulin ([facility])- (present on admission)  Assessment & Plan  With hyperglycemia  Last glycated hemoglobin was 7.1%  Short acting insulin  Accu-Checks, hypoglycemia protocol     Chronic systolic heart failure ([facility])- (present on admission)  Assessment & Plan  With worsening over the last 2 weeks of fatigue  Intermittent chest pain  Stress test shows no evidence of reversible ischemia ejection fraction 19%    Paroxysmal A-fib ([facility])- (present on admission)  Assessment & Plan  Currently sinus on metoprolol and digoxin  Will obtain digoxin level  Continue home Eliquis      Benign prostatic hyperplasia without lower urinary tract symptoms- (present on admission)  Assessment & Plan  Continue home Proscar and Flomax  Monitor input output, check bladder scans       VTE prophylaxis: eliquis

## 2021-06-03 NOTE — PROGRESS NOTES
Diet was advanced; patient tolerated well with no complaints of nausea or abdominal pain.  Patient ambulating in halls, mood is lightened. Patient stating ready for discharge.   IV dc'd, discharge instructions reviewed and all questions/concerns addressed. Patient is ready for discharge to home.

## 2021-06-03 NOTE — CARE PLAN
The patient is Stable - Low risk of patient condition declining or worsening    Shift Goals  Clinical Goals: Pain management, Rest  Patient Goals: Rest    Progress made toward(s) clinical / shift goals:  Pt denies the need for pain medication at this time. Pt educated to call for assistance    Patient is not progressing towards the following goals: n/a       Problem: Psychosocial  Goal: Patient's level of anxiety will decrease  Outcome: Progressing  Goal: Patient's ability to verbalize feelings about condition will improve  Outcome: Progressing     Problem: Communication  Goal: The ability to communicate needs accurately and effectively will improve  Outcome: Progressing     Problem: Discharge Barriers/Planning  Goal: Patient's continuum of care needs are met  Outcome: Progressing

## 2021-06-03 NOTE — PROGRESS NOTES
Report received from JOSLYN Dow. Plan of care discussed at patient's bedside with pt and spouse. Whiteboard has been updated. Pt is resting comfortably in bed and declines any further needs at this time. Safety precautions in place and call light within reach.

## 2021-06-03 NOTE — PROGRESS NOTES
4 Eyes Skin Assessment Completed by Connie, RN and Bridget OH RN.    Head WDL  Ears WDL  Nose WDL  Mouth WDL  Neck WDL  Breast/Chest WDL  Shoulder Blades WDL  Spine WDL  (R) Arm/Elbow/Hand Swelling  (L) Arm/Elbow/Hand Swelling  Abdomen Scar  Groin WDL  Scrotum/Coccyx/Buttocks WDL  (R) Leg WDL  (L) Leg WDL  (R) Heel/Foot/Toe Scab between big toe  (L) Heel/Foot/Toe Scab on top of middle toes          Devices In Places Tele Box      Interventions In Place Pillows    Possible Skin Injury No    Pictures Uploaded Into Epic N/A  Wound Consult Placed N/A  RN Wound Prevention Protocol Ordered No

## 2021-06-03 NOTE — PROGRESS NOTES
"Pt returned from HIDA scan after 1700.  Pt wanted his oxy for his abdominal pain and some food; educated that he had to eat full liquids without getting nausea before getting \"real\" food.     Tele strip at 1410 shows SR at 60.      Measurements from am strip were as follows:  NC=0.08  QRS=0.06  QT=0.40    Tele Shift Summary:    Rhythm : SR  Rate : 60-91  Ectopy : Per CCT Janice, pt had rare to occasional PVC, rare PACs and trigeminy and bigeminy.     Telemetry monitoring strips placed in pt's chart.             "

## 2021-06-03 NOTE — DISCHARGE INSTRUCTIONS
Discharge Instructions    Discharged to home by car with relative. Discharged via wheelchair, hospital escort: Yes.  Special equipment needed: Not Applicable    Be sure to schedule a follow-up appointment with your primary care doctor or any specialists as instructed.     Discharge Plan:        I understand that a diet low in cholesterol, fat, and sodium is recommended for good health. Unless I have been given specific instructions below for another diet, I accept this instruction as my diet prescription.   Other diet: GI Soft    Special Instructions: None    · Is patient discharged on Warfarin / Coumadin?   No     Depression / Suicide Risk    As you are discharged from this [facility] facility, it is important to learn how to keep safe from harming yourself.    Recognize the warning signs:  · Abrupt changes in personality, positive or negative- including increase in energy   · Giving away possessions  · Change in eating patterns- significant weight changes-  positive or negative  · Change in sleeping patterns- unable to sleep or sleeping all the time   · Unwillingness or inability to communicate  · Depression  · Unusual sadness, discouragement and loneliness  · Talk of wanting to die  · Neglect of personal appearance   · Rebelliousness- reckless behavior  · Withdrawal from people/activities they love  · Confusion- inability to concentrate     If you or a loved one observes any of these behaviors or has concerns about self-harm, here's what you can do:  · Talk about it- your feelings and reasons for harming yourself  · Remove any means that you might use to hurt yourself (examples: pills, rope, extension cords, firearm)  · Get professional help from the community (Mental Health, Substance Abuse, psychological counseling)  · Do not be alone:Call your Safe Contact- someone whom you trust who will be there for you.  · Call your local CRISIS HOTLINE 661-6630 or 225-252-8946  · Call your local Children's Mobile Crisis  Response Team [facility] (741) 201-4051 or www.Magiq.Kiind.me  · Call the toll free National Suicide Prevention Hotlines   · National Suicide Prevention Lifeline 177-477-ECVD (1740)  · National Hope Line Network 800-SUICIDE (699-3481)

## 2021-06-03 NOTE — CARE PLAN
The patient is Stable - Low risk of patient condition declining or worsening    Shift Goals  Clinical Goals: Tolerate advancing diet  Patient Goals: Eat food    Progress made toward(s) clinical / shift goals:  Diet advanced to GI soft. Patient tolerated diet for breakfast and lunch. No complaints of stomach pain (headache pain medicated).  Patient states ready for discharge.       Problem: Psychosocial  Goal: Patient's level of anxiety will decrease  Outcome: Met  Goal: Patient's ability to verbalize feelings about condition will improve  Outcome: Met     Problem: Communication  Goal: The ability to communicate needs accurately and effectively will improve  Outcome: Met     Problem: Discharge Barriers/Planning  Goal: Patient's continuum of care needs are met  Outcome: Met

## 2021-06-03 NOTE — DISCHARGE SUMMARY
"Discharge Summary    CHIEF COMPLAINT ON ADMISSION  Chief Complaint   Patient presents with   • Altered Mental Status     Reports feeling \"disoriented, it got really bad about 2 hours ago\". Reports also headache.   • Vomiting     Pt. reports \"I think I'm having a heart attack, it's all in my records\". Denies CP.        Reason for Admission  Chest pain,      Admission Date  5/28/2021    CODE STATUS  Full Code    HPI & HOSPITAL COURSE  70 y.o. male who presented 5/28/2021 with abdominal pain, nausea, vomiting, headache and disorientation.  Patient states over the last couple of weeks he has had worsening fatigue as well as intermittent chest pain.  He did see his cardiologist approximately a week ago, there is a stress and echo planned.  He states he was doing okay until around 2 PM today when he began having his symptoms.  It self to get an exact timeframe but it sounds like he began becoming nauseous then a headache then vomiting.  Once he began vomiting his family noted some confusion and he classified it as \"disorientation\".  He denies vertigo.  He denies any fever or chills.  No known sick contacts.  He states his abdominal pain and headache can be severe at times, sharp in nature.  He states his chest pain is mild.  He had stress test which returned as no evidence of reversible ischemia but did have large prior apical infarct and EF 19% - consistent with his known disease.  He continued to have abdominal pain and there was concern of gastroparesis so gastric emptying study was completed and was normal.  RUQ US did show gall stones so HIDA scan was persued to see if gall bladder disease was causing his discomfort - it was also essentially normal only showing a slightly low ejection rate of emptying.  He was started on treatment for gastritis and had dramatic improvement in his symptoms.  He will continue with PPI an carafate until the bottle is completed and will follow with his PCP.    Therefore, he is discharged " in good and stable condition to home with close outpatient follow-up.    The patient met 2-midnight criteria for an inpatient stay at the time of discharge.    Discharge Date  6/3/2021    FOLLOW UP ITEMS POST DISCHARGE  PCP - 2 weeks    DISCHARGE DIAGNOSES  Principal Problem:    AP (abdominal pain) POA: Yes  Active Problems:    Benign prostatic hyperplasia without lower urinary tract symptoms (Chronic) POA: Yes    Paroxysmal A-fib (HCC) (Chronic) POA: Yes    Chronic systolic heart failure (HCC) (Chronic) POA: Yes    Type 2 diabetes mellitus with complication, without long-term current use of insulin (HCC) POA: Yes      Overview: Chronic condition treated with Glipizide and Lantus.      Sliding scale insulin during acute hospitalization.          SLE (systemic lupus erythematosus related syndrome) ([facility]) POA: Yes    Hypertensive urgency POA: Yes    Calculus of gallbladder POA: Yes  Resolved Problems:    * No resolved hospital problems. *      FOLLOW UP  Future Appointments   Date Time Provider Department Center   6/4/2021  8:30 AM [facility] NM ECAM [facility]NM [facility]   7/8/2021  8:15 AM [facility] ECHO 1 ECSM [facility]   7/14/2021  8:50 AM Gamaliel Stephens M.D. ENCR [facility]   8/5/2021  1:00 PM Liliana Song M.D. SNCAB None     Nereyda Moore, A.P.R.N.  [address] [facility]  [address] [place] [id number]  [phone]    In 2 weeks        MEDICATIONS ON DISCHARGE     Medication List      START taking these medications      Instructions   omeprazole 20 MG delayed-release capsule  Commonly known as: PRILOSEC   Take 1 capsule by mouth 2 times a day.  Dose: 20 mg     ondansetron 4 MG Tabs tablet  Commonly known as: Zofran   Take 1 tablet by mouth every four hours as needed for Nausea/Vomiting.  Dose: 4 mg     sucralfate 1 GM/10ML Susp  Commonly known as: CARAFATE   Take 10 mL by mouth 4 Times a Day,Before Meals and at Bedtime.  Dose: 1 g        CHANGE how you take these medications      Instructions   colesevelam 625 MG Tabs  What  changed: when to take this  Commonly known as: WELCHOL   Take 3 Tabs by mouth 2 times a day, with meals.  Dose: 1,875 mg     tamsulosin 0.4 MG capsule  What changed:   · how much to take  · when to take this  Commonly known as: FLOMAX   Take 2 Caps by mouth every day.  Dose: 0.8 mg        CONTINUE taking these medications      Instructions   aspirin EC 81 MG Tbec  Commonly known as: ECOTRIN   Take 1 Tab by mouth every day.  Dose: 81 mg     * Blood Glucose Test Strips   verio test strips for glucose monitoring TID and PRN     * OneTouch Verio w/Device Kit   1 Device 3 times a day before meals.  Dose: 1 Device     digoxin 125 MCG Tabs  Commonly known as: LANOXIN   Take 1 tablet by mouth every day at 6 PM.  Dose: 125 mcg     Eliquis 5mg Tabs  Generic drug: apixaban   TAKE 1 TABLET BY MOUTH TWICE A DAY     finasteride 5 MG Tabs  Commonly known as: PROSCAR   TAKE 1 TABLET BY MOUTH EVERY DAY     hydroxyurea 500 MG Caps  Commonly known as: HYDREA   Take 500 mg by mouth 2 Times a Day.  Dose: 500 mg     metoprolol SR 25 MG Tb24  Commonly known as: TOPROL XL   Take 1 tablet by mouth every evening.  Dose: 25 mg     OneTouch Verio strip  Generic drug: glucose blood   VERIO TEST STRIPS FOR GLUCOSE MONITORING 3 TIMES A DAY AND AS NEEDED *INS COVERS 100 PER 90 DAY     oxyCODONE immediate-release 5 MG Tabs  Commonly known as: ROXICODONE   Take 10 mg by mouth every 6 hours as needed for Severe Pain. Indications: Chronic Pain  Dose: 10 mg     spironolactone 25 MG Tabs  Commonly known as: ALDACTONE   Take 1 tablet by mouth every day.  Dose: 25 mg     therapeutic multivitamin-minerals Tabs   Take 1 Tab by mouth every day.  Dose: 1 tablet     Tresiba FlexTouch 200 UNIT/ML Sopn  Generic drug: Insulin Degludec   Inject 12 Units under the skin every evening.  Dose: 12 Units         * This list has 2 medication(s) that are the same as other medications prescribed for you. Read the directions carefully, and ask your doctor or other care  "provider to review them with you.                Allergies  Allergies   Allergen Reactions   • Doxycycline      Swelling lips and difficulties swallowing   • Beta Adrenergic Blockers Unspecified     dizziness   • Metformin Unspecified and Palpitations     Cardiac effects  \"Similar to a heart attack, I was hospitalized\"   • Simvastatin      Other reaction(s): Other (Specify with Comments)  Myalgias  Myalgias   • Statins [Hmg-Coa-R Inhibitors]      Muscle ache, joint pain       DIET  Orders Placed This Encounter   Procedures   • Diet Order Diet: Low Fiber(GI Soft)     Standing Status:   Standing     Number of Occurrences:   1     Order Specific Question:   Diet:     Answer:   Low Fiber(GI Soft) [2]       ACTIVITY  As tolerated.  Weight bearing as tolerated    CONSULTATIONS  none    PROCEDURES  none    LABORATORY  Lab Results   Component Value Date    SODIUM 135 06/03/2021    POTASSIUM 4.0 06/03/2021    CHLORIDE 101 06/03/2021    CO2 23 06/03/2021    GLUCOSE 147 (H) 06/03/2021    BUN 16 06/03/2021    CREATININE 0.88 06/03/2021        Lab Results   Component Value Date    WBC 4.4 (L) 06/03/2021    HEMOGLOBIN 14.9 06/03/2021    HEMATOCRIT 47.1 06/03/2021    PLATELETCT 205 06/03/2021        Total time of the discharge process exceeds 35 minutes.  "

## 2021-06-03 NOTE — PROGRESS NOTES
Telemetry Shift Summary    Rhythm SR/SB  HR Range 56-65  Ectopy rPVC, rPAC, rBig, rTrig  Measurements 0.18/0.10/0.40        Normal Values  Rhythm SR  HR Range    Measurements 0.12-0.20 / 0.06-0.10  / 0.30-0.52

## 2021-06-07 ENCOUNTER — PATIENT MESSAGE (OUTPATIENT)
Dept: MEDICAL GROUP | Facility: MEDICAL CENTER | Age: 71
End: 2021-06-07

## 2021-06-08 ENCOUNTER — PATIENT MESSAGE (OUTPATIENT)
Dept: MEDICAL GROUP | Facility: MEDICAL CENTER | Age: 71
End: 2021-06-08

## 2021-06-08 DIAGNOSIS — M35.3 POLYMYALGIA RHEUMATICA (HCC): ICD-10-CM

## 2021-06-08 DIAGNOSIS — M32.9 SLE (SYSTEMIC LUPUS ERYTHEMATOSUS RELATED SYNDROME) (HCC): ICD-10-CM

## 2021-06-08 DIAGNOSIS — M32.9 LUPUS (HCC): ICD-10-CM

## 2021-06-08 DIAGNOSIS — R70.0 ELEVATED SED RATE: ICD-10-CM

## 2021-06-08 NOTE — TELEPHONE ENCOUNTER
"From: Francesco Schulte  To: Nurse Practitioner Nereyda Moore  Sent: 6/8/2021 2:44 PM PDT  Subject: Non-Urgent Medical Question    Yes to [place]. Dr Horan was last. Not pleased. Lupus is all over me since Covid in Jan.  Very concerned about [facility]  Verses heart outcome at hospital.  Numbers don't add up.  Concerned and very weak.      ----- Message -----   From:Nurse Practitioner Nereyda Moore    Sent:6/8/2021 2:36 PM PDT   To:Francesco Schulte   Subject:RE: Non-Urgent Medical Question    Roxann Rojas,  I did see that you were in the hospital recently. Who was the last rheumatologist that you saw? Unfortunately it takes several months, or even up to a year, to get an appointment with rheumatology in some cases. Would you be willing to travel to [place] to see someone?  [place] GOLDEN      ----- Message -----   From:Francesco Schulte   Sent:6/7/2021 2:47 PM PDT   To:Nurse Practitioner Nereyda Moore   Subject:Non-Urgent Medical Question    Spent last week at [facility] with inconclusive results very concerned do you have a referral with a \"[facility] \"RA lupus doctor in order to try to find out what is going on?  "

## 2021-06-16 ENCOUNTER — OFFICE VISIT (OUTPATIENT)
Dept: CARDIOLOGY | Facility: MEDICAL CENTER | Age: 71
End: 2021-06-16
Payer: MEDICARE

## 2021-06-16 VITALS
HEART RATE: 72 BPM | BODY MASS INDEX: 23.4 KG/M2 | RESPIRATION RATE: 16 BRPM | WEIGHT: 188.2 LBS | DIASTOLIC BLOOD PRESSURE: 76 MMHG | OXYGEN SATURATION: 96 % | SYSTOLIC BLOOD PRESSURE: 122 MMHG | HEIGHT: 75 IN

## 2021-06-16 DIAGNOSIS — I25.5 ISCHEMIC CARDIOMYOPATHY: ICD-10-CM

## 2021-06-16 DIAGNOSIS — I50.22 CHRONIC SYSTOLIC HEART FAILURE (HCC): ICD-10-CM

## 2021-06-16 DIAGNOSIS — I25.10 CORONARY ARTERY DISEASE INVOLVING NATIVE CORONARY ARTERY OF NATIVE HEART WITHOUT ANGINA PECTORIS: ICD-10-CM

## 2021-06-16 DIAGNOSIS — D75.1 POLYCYTHEMIA: ICD-10-CM

## 2021-06-16 DIAGNOSIS — K80.20 GALLSTONES: ICD-10-CM

## 2021-06-16 DIAGNOSIS — I48.91 ATRIAL FIBRILLATION, UNSPECIFIED TYPE (HCC): ICD-10-CM

## 2021-06-16 DIAGNOSIS — I47.10 SVT (SUPRAVENTRICULAR TACHYCARDIA) (HCC): ICD-10-CM

## 2021-06-16 DIAGNOSIS — E78.5 DYSLIPIDEMIA: ICD-10-CM

## 2021-06-16 DIAGNOSIS — M32.9 LUPUS (HCC): ICD-10-CM

## 2021-06-16 DIAGNOSIS — Z78.9 STATIN INTOLERANCE: ICD-10-CM

## 2021-06-16 PROCEDURE — 99214 OFFICE O/P EST MOD 30 MIN: CPT | Performed by: NURSE PRACTITIONER

## 2021-06-16 RX ORDER — DOCUSATE SODIUM 100 MG/1
100 CAPSULE, LIQUID FILLED ORAL 2 TIMES DAILY PRN
Qty: 90 CAPSULE | Refills: 1 | Status: SHIPPED | OUTPATIENT
Start: 2021-06-16 | End: 2022-05-31

## 2021-06-16 RX ORDER — FOLIC ACID 1 MG/1
1 TABLET ORAL
COMMUNITY
Start: 2021-05-31 | End: 2021-06-16

## 2021-06-16 ASSESSMENT — FIBROSIS 4 INDEX: FIB4 SCORE: 1.68

## 2021-06-16 NOTE — PROGRESS NOTES
Chief Complaint   Patient presents with   • Atrial Fibrillation     F/V Dx: Paroxysmal A-fib (HCC)   • CHF (Systolic)     F/V Dx: Chronic systolic heart failure (HCC)   • Coronary Artery Disease     F/V Dx:Carotid stenosis, 90% right carotid & Stenosis of right carotid artery-Right CEA 3/21/18         Subjective:   Bob Schulte is a 70 y.o. male patient of Dr. Liliana Song who presents today for hospital follow up.    Patient was admitted on 5/28/2021 with abdominal pain, nausea, vomiting, headache and disorientation. He had an MPI which showed no evidence of reversible ischemia but did show a large prior apical infarct. He underwent a gastric emptying study and was normal. RUQ US showed gallstones, HIDA was essentially normal. His symptoms improved with treatment for gastritis. He was discharged on 6/3/2021.     Patient was last seen by myself on 4/13/2021. He had some concern about elevated BP in the evening hours, he had stopped taking his lisinopril and aldactone as there was no more refills. His lisinopril and Aldactone was restarted.    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      Today patient states that he is overall feeling better since discharging from the hospital. He was upset to hear that his LVEF did not improve at all. Denies chest pain, edema, palpitations, dizziness or syncope.     Past Medical History:   Diagnosis Date   • Anesthesia     resistant to pain meds   • Cancer (HCC)     polycythemia vera   • Diabetes (HCC)     insulin and oral meds   • Family history of punctured lung 2002     left lung   • Heart attack (HCC) 03/2017   • Hemorrhagic disorder (HCC)     on blood thinners   • High cholesterol    • Ischemic cardiomyopathy    • Kidney stones     hx of kidney stones   • Orthostatic hypotension 3/29/2018   • Pain     headache pain   • Polycythemia vera(238.4)    • Stroke (HCC) 2016    r/t afib; eye issues resolved afterward   • Urinary bladder disorder     enlarged prostate, weak stream, difficulty urinating   • Vertigo      Past Surgical History:   Procedure Laterality Date   • SPLIT THICKNESS SKIN GRAFT N/A 8/23/2020    Procedure: APPLICATION, GRAFT, SKIN, SPLIT-THICKNESS;  Surgeon: Elisa Craig M.D.;  Location: [facility];  Service: Plastics   • SKIN ABSCESS INCISION AND DRAINAGE Right 8/3/2020    Procedure: INCISION AND DRAINAGE - SCROTAL WOUND - WOUND VAC PLACEMENT;  Surgeon: Jaylen Hayes M.D.;  Location: [facility];  Service: Urology   • PB EXPLORE SCROTUM Right 7/31/2020    Procedure: EXPLORATION, SCROTUM - FOR WASH OUT OF SCROTAL WOUND & WOUND VAC PLACEMENT;  Surgeon: Ferny Rosales M.D.;  Location: [facility];  Service: Urology   • PB EXPLORE SCROTUM Right 7/29/2020    Procedure: EXPLORATION, SCROTUM - FOR I & D OF SCROTAL AND  GROIN WOUND;  Surgeon: Donta Viera M.D.;  Location: [facility];  Service: Urology   • PB INCIS/DRAIN SCROTUM/TESTIS,EPIDIDYM Right 7/25/2020    Procedure: INCISION AND DRAINAGE, SCROTUM;  Surgeon: Nick Negro M.D.;  Location: [facility];  Service: Urology   • TEMPORAL ARTERY BIOPSY Right 6/26/2018    Procedure: TEMPORAL ARTERY BIOPSY;  Surgeon: Rajendra Aguilar M.D.;  Location: SURGERY SAME DAY [facility];  Service: General   • CAROTID ENDARTERECTOMY Right 3/21/2018    Procedure: CAROTID ENDARTERECTOMY- W/EEG MONITORING;  Surgeon: Benny Morfin M.D.;  Location: [facility];  Service: General   • APPENDECTOMY     • CATH PLACEMENT      right arm   •  "LAMINOTOMY      diskectomy; C4   • OTHER CARDIAC SURGERY      stents x 3     History reviewed. No pertinent family history.  Social History     Socioeconomic History   • Marital status:      Spouse name: Not on file   • Number of children: Not on file   • Years of education: Not on file   • Highest education level: Not on file   Occupational History   • Not on file   Tobacco Use   • Smoking status: Never Smoker   • Smokeless tobacco: Never Used   Vaping Use   • Vaping Use: Never used   Substance and Sexual Activity   • Alcohol use: No   • Drug use: No   • Sexual activity: Not on file   Other Topics Concern   • Not on file   Social History Narrative   • Not on file     Social Determinants of Health     Financial Resource Strain:    • Difficulty of Paying Living Expenses:    Food Insecurity:    • Worried About Running Out of Food in the Last Year:    • Ran Out of Food in the Last Year:    Transportation Needs:    • Lack of Transportation (Medical):    • Lack of Transportation (Non-Medical):    Physical Activity:    • Days of Exercise per Week:    • Minutes of Exercise per Session:    Stress:    • Feeling of Stress :    Social Connections:    • Frequency of Communication with Friends and Family:    • Frequency of Social Gatherings with Friends and Family:    • Attends [employer] Services:    • Active Member of Clubs or Organizations:    • Attends Club or Organization Meetings:    • Marital Status:    Intimate Partner Violence:    • Fear of Current or Ex-Partner:    • Emotionally Abused:    • Physically Abused:    • Sexually Abused:      Allergies   Allergen Reactions   • Doxycycline      Swelling lips and difficulties swallowing   • Beta Adrenergic Blockers Unspecified     dizziness   • Metformin Unspecified and Palpitations     Cardiac effects  \"Similar to a heart attack, I was hospitalized\"   • Simvastatin      Other reaction(s): Other (Specify with Comments)  Myalgias  Myalgias   • Statins [Hmg-Coa-R " Inhibitors]      Muscle ache, joint pain     Outpatient Encounter Medications as of 6/16/2021   Medication Sig Dispense Refill   • docusate sodium (COLACE) 100 MG Cap Take 1 capsule by mouth 2 times a day as needed for Constipation. 90 capsule 1   • omeprazole (PRILOSEC) 20 MG delayed-release capsule Take 1 capsule by mouth 2 times a day. 60 capsule 2   • ondansetron (ZOFRAN) 4 MG Tab tablet Take 1 tablet by mouth every four hours as needed for Nausea/Vomiting. 30 tablet 1   • sucralfate (CARAFATE) 1 GM/10ML Suspension Take 10 mL by mouth 4 Times a Day,Before Meals and at Bedtime. 414 mL 0   • ELIQUIS 5 MG Tab TAKE 1 TABLET BY MOUTH TWICE A  tablet 3   • spironolactone (ALDACTONE) 25 MG Tab Take 1 tablet by mouth every day. 90 tablet 3   • digoxin (LANOXIN) 125 MCG Tab Take 1 tablet by mouth every day at 6 PM. 90 tablet 3   • metoprolol SR (TOPROL XL) 25 MG TABLET SR 24 HR Take 1 tablet by mouth every evening. 90 tablet 3   • ONETOUCH VERIO strip VERIO TEST STRIPS FOR GLUCOSE MONITORING 3 TIMES A DAY AND AS NEEDED *INS COVERS 100 PER 90 DAY     • Blood Glucose Monitoring Suppl (ONETOUCH VERIO) w/Device Kit 1 Device 3 times a day before meals. 1 Kit 1   • finasteride (PROSCAR) 5 MG Tab TAKE 1 TABLET BY MOUTH EVERY DAY 90 tablet 1   • colesevelam (WELCHOL) 625 MG Tab Take 3 Tabs by mouth 2 times a day, with meals. (Patient taking differently: Take 1,875 mg by mouth every 48 hours.) 180 Tab 11   • Insulin Degludec (TRESIBA FLEXTOUCH) 200 UNIT/ML Solution Pen-injector Inject 12 Units under the skin every evening. 3 mL 11   • Blood Glucose Test Strips verio test strips for glucose monitoring TID and  Each 5   • hydroxyurea (HYDREA) 500 MG Cap Take 500 mg by mouth 2 Times a Day.     • tamsulosin (FLOMAX) 0.4 MG capsule Take 2 Caps by mouth every day. (Patient taking differently: Take 0.4-0.8 mg by mouth every evening.) 60 Cap 6   • aspirin EC (ECOTRIN) 81 MG Tablet Delayed Response Take 1 Tab by mouth every  "day. 30 Tab    • therapeutic multivitamin-minerals (THERAGRAN-M) Tab Take 1 Tab by mouth every day.     • [DISCONTINUED] folic acid (FOLVITE) 1 MG Tab Take 1 mg by mouth every day. (Patient not taking: Reported on 6/16/2021)     • oxyCODONE immediate-release (ROXICODONE) 5 MG Tab Take 10 mg by mouth every 6 hours as needed for Severe Pain. Indications: Chronic Pain       No facility-administered encounter medications on file as of 6/16/2021.     Review of Systems   Constitutional: Positive for malaise/fatigue. Negative for weight loss.   Respiratory: Negative for shortness of breath.    Cardiovascular: Negative for chest pain, palpitations, orthopnea, claudication, leg swelling and PND.   Neurological: Negative for dizziness and weakness.   All other systems reviewed and are negative.       Objective:   /76 (BP Location: Left arm, Patient Position: Sitting, BP Cuff Size: Adult)   Pulse 72   Resp 16   Ht 1.905 m (6' 3\")   Wt 85.4 kg (188 lb 3.2 oz)   SpO2 96%   BMI 23.52 kg/m²     Physical Exam   Constitutional: He is oriented to person, place, and time. He appears well-developed. No distress.   HENT:   Head: Normocephalic.   Neck: No JVD present.   Cardiovascular: Normal rate, regular rhythm and normal pulses.   Pulmonary/Chest: Effort normal and breath sounds normal.   Abdominal: Soft. He exhibits no distension.   Musculoskeletal:      Cervical back: Normal range of motion.      Right lower leg: No edema.      Left lower leg: No edema.   Neurological: He is alert and oriented to person, place, and time.   Skin: Skin is warm and dry.   Psychiatric: His behavior is normal.     Holter 10-9-19  Normal sinus rhythm   Occasional premature ventricular contractions (approximately 3.4% of total   beats)   No sustained arrhythmias noted      Echocardiogram:  8-  CONCLUSIONS  Prior echo 7/1/20, no significant changes are noted.  Left ventricular ejection fraction is visually estimated to be 30%.  Unable to " estimate pulmonary artery pressure due to an inadequate tricuspid regurgitant jet.     Cardiac Catheterization:   DATE OF SERVICE:  03/11/2017     PROCEDURE:  Cardiac catheterization and Percutaneous Coronary Intervention.  A.  Left heart catheterization.  B.  Left ventriculography.  C.  Selective coronary angiography.  D.  Coronary aspiration thrombectomy of the left anterior descending artery.  E.  Coronary stent implantation of the proximal left anterior descending artery with a 4.0x8 mm drug-eluting Alpine Xience stent for in-stent restenosis.  F.  Coronary stent implantation of the mid left anterior descending artery with a 2.5x38 mm drug-eluting Xience Alpine stent post-dilated to 3.0 mm.  G.  Right radial artery approach.     PREOPERATIVE DIAGNOSES:  1.  ST elevation myocardial infarction, anterior.  2.  Coronary artery disease with previous myocardial infarction and coronary   intervention in 2008 and 2010.  3.  Paroxysmal atrial fibrillation.  4.  Previous stroke, December 2016.  5.  Hyperlipidemia.  6.  Polycythemia rubra vera with thrombocytopenia.  7.  Diabetes mellitus.     POSTPROCEDURE DIAGNOSES:  1.  Subacute stent thrombosis.  2.  In-stent restenosis of the proximal left anterior descending artery stent.  3.  Severe diffuse obstructive coronary artery disease of the mid left anterior descending artery.     Stress Test: 1/6/2018  NUCLEAR IMAGING INTERPRETATION  Severe left ventriular dilation at rest.  No additional dilation noted with stress.  Chronic left bundle branch block at rest.  Extensive, large prior inferior infarct.  No evidence of ischemia.  Global hypokinesis with resting LVEF 20-30%.  ECG INTERPRETATION  Nondiagnostic.    Echocardiogram 12/3/2020:     CONCLUSIONS  Limited for evaluation of pericardial effusion.  Severely reduced left ventricular systolic function, EF estimated to be   20%.    Global hypokinesis with akinesis of the mid to apical LAD distribution.    Trivial anterior  pericardial effusion without evidence of hemodynamic   compromise.     Compared to the images of the prior study done on 10-, effusion is smaller, EF appears further reduced.    Echocardiogram 1/2/2021:     CONCLUSIONS  Prior echo 12/3/20, no significant changes  Severely reduced left ventricular systolic function.  Left ventricular ejection fraction is visually estimated to be 20%.  Normal right ventricular size and systolic function.    Echocardiogram 5/29/2021:  CONCLUSIONS  Severely dilated LV and severely reduced LV and RVsystolic function. There is global hypokinesis and apical akinesis. No evidence of LV thrombus. LVEF estimated about 15-20%. No significant valuvlar dysfunction. Compared to the images of the prior study done 1/2/2021 looks similar.    NM Cardiac Stress Test 5/29/2021:  NUCLEAR IMAGING INTERPRETATION  Severe left ventricular dysfunction. Ejection fraction measured at 19%.  Large prior apical infarct. No reversible ischemia.  ECG INTERPRETATION  Non-diagnsotic     Medical Decision Making:  Today's Assessment / Status / Plan:     Ischemic CM (EF 20%), NYHA Classs I-II:  -Continues to appears well compensated on exam, no evidence of acute heart failure.   -Continue Toprol XL 25 mg daily, digoxin 125 mcg daily and aldactone 25 mg daily.  -Unable to tolerate ACE/ARB due to dizziness and severe hypotension.   -Offered to start losartan in place of lisinopril but patient refuses.   -Repeat limited TTE completed on 5/29/2021 shows LVEF remains severely depressed at 20%.   -Known underlying LBBB, referral placed to EP to evaluate patient for possible CRT-D.     CAD:  -S/P STEMI with multiple PCI (LAD, Lcs and POBA to the digonal) in setting of severe polycythemia.   -Recent MPI showed no reversible ischemia.   -Continue ASA 81 mg daily and Toprol XL as above.     HTN:  -Well controlled per patient, stable in office today.     Carotid Artery Disease:  -S/P Right CEA in 2018.    HLD:  -LDL 90,  goal is <70.   -Intolerant to statins, believes he has tried Zetia in the past and did no tolerate that either.   -Referral placed to lipid clinic to discuss PCSK9 inhibitor therapy.     PAF:  -First diagnosed in 2016.   -No known recurrence that I can find.   -OAC with Eliquis 5 mg BID, tolerating well.   -Continue BB as above.     SVT:  -Stable.   -Continue BB as above.     Patient will follow up with myself as scheduled below or earlier if needed. Encouraged patient to contact our office should any questions or concerns arise in the mean time.     Future Appointments   Date Time Provider Department Center   6/4/2021  8:30 AM [facility] NM [facility] M[facility]NA [facility]   7/8/2021  8:15 AM [facility] ECHO 1 DELMER [facility]   7/14/2021  8:50 AM Gamaliel Stephens M.D. [facility]   8/5/2021  1:00 PM Liliana Song M.D. [facility] None

## 2021-06-18 NOTE — ANESTHESIA POSTPROCEDURE EVALUATION
Patient: Francesco Schulte    Procedure Summary     Date:  07/25/20 Room / Location:   OR  / SURGERY [facility]    Anesthesia Start:  1926 Anesthesia Stop:  2100    Procedure:  INCISION AND DRAINAGE, SCROTUM (Right Scrotum) Diagnosis:  (Scrotal Abscess)    Surgeon:  Nick Negro M.D. Responsible Provider:  Ashwin Morfin M.D.    Anesthesia Type:  general ASA Status:  4 - Emergent          Final Anesthesia Type: general  Last vitals  BP   Blood Pressure : 156/92, Arterial BP: (!) 173/81    Temp   36.2 °C (97.2 °F)    Pulse   Pulse: 91   Resp   18    SpO2   93 %      Anesthesia Post Evaluation    Patient location during evaluation: PACU  Patient participation: complete - patient participated  Level of consciousness: awake and alert  Pain score: 0    Airway patency: patent  Anesthetic complications: no  Cardiovascular status: hemodynamically stable  Respiratory status: acceptable  Hydration status: euvolemic    PONV: none           Nurse Pain Score: 7 (NPRS)         no dysuria, no frequency, and no hematuria.

## 2021-06-22 ASSESSMENT — ENCOUNTER SYMPTOMS
SHORTNESS OF BREATH: 0
PND: 0
PALPITATIONS: 0
ORTHOPNEA: 0
WEAKNESS: 0
DIZZINESS: 0
WEIGHT LOSS: 0
CLAUDICATION: 0

## 2021-06-28 ENCOUNTER — OFFICE VISIT (OUTPATIENT)
Dept: MEDICAL GROUP | Facility: MEDICAL CENTER | Age: 71
End: 2021-06-28
Payer: MEDICARE

## 2021-06-28 ENCOUNTER — PATIENT MESSAGE (OUTPATIENT)
Dept: MEDICAL GROUP | Facility: MEDICAL CENTER | Age: 71
End: 2021-06-28

## 2021-06-28 VITALS
HEIGHT: 75 IN | DIASTOLIC BLOOD PRESSURE: 84 MMHG | WEIGHT: 187.39 LBS | SYSTOLIC BLOOD PRESSURE: 132 MMHG | HEART RATE: 79 BPM | TEMPERATURE: 96.7 F | BODY MASS INDEX: 23.3 KG/M2 | RESPIRATION RATE: 20 BRPM | OXYGEN SATURATION: 97 %

## 2021-06-28 DIAGNOSIS — I48.0 PAROXYSMAL A-FIB (HCC): Chronic | ICD-10-CM

## 2021-06-28 DIAGNOSIS — M32.9 SLE (SYSTEMIC LUPUS ERYTHEMATOSUS RELATED SYNDROME) (HCC): ICD-10-CM

## 2021-06-28 DIAGNOSIS — R11.0 NAUSEA: ICD-10-CM

## 2021-06-28 DIAGNOSIS — I50.22 CHRONIC SYSTOLIC HEART FAILURE (HCC): Chronic | ICD-10-CM

## 2021-06-28 DIAGNOSIS — R10.9 LEFT SIDED ABDOMINAL PAIN: ICD-10-CM

## 2021-06-28 DIAGNOSIS — R10.32 LEFT LOWER QUADRANT ABDOMINAL PAIN: ICD-10-CM

## 2021-06-28 PROCEDURE — 99214 OFFICE O/P EST MOD 30 MIN: CPT | Performed by: NURSE PRACTITIONER

## 2021-06-28 ASSESSMENT — FIBROSIS 4 INDEX: FIB4 SCORE: 1.68

## 2021-06-29 NOTE — TELEPHONE ENCOUNTER
From: Francesco Schulte  To: Nurse Practitioner Nereyda Moore  Sent: 6/28/2021 4:04 PM PDT  Subject: Non-Urgent Medical Question    Omdansetron  DOCUSATE

## 2021-06-30 NOTE — PROGRESS NOTES
Subjective:     Chief Complaint   Patient presents with   • Advice Only     ER FV, Gallstones, Afib  x2wk     Francesco Schulte is a 70 y.o. male established patient with complex medical history including systemic lupus, polycythemia vera, chronic heart failure, and polymyalgia here to discuss recent hospitalization and ongoing concerns with upper abdominal pain and nausea.  He was admitted to the hospital on 5/28/2021 with acute onset of abdominal pain, nausea, vomiting.  He had cardiac evaluation performed including MPI which showed no evidence of reversible ischemia but did show a large prior apical infarct.  He underwent a gastric emptying study which was normal.  An upper quadrant ultrasound showed gallstones but ultimately HIDA scan was normal as well.  He was started on medication treatment for suspected gastritis and clinically improved, was able to discharge on 6/3/2021.  He has since been seen by his cardiology office, I have reviewed this note.  Most recent echocardiogram from 5/29/2021 showing severely dilated left ventricle and severely reduced left ventricular and right ventricular systolic function, global hypokinesis, LVEF estimated at 15 to 20%.  This was not showing no significant change from his last echo in January of this year.  Nuclear medicine stress test performed on the same date also with no new findings.  Plan from a cardiac standpoint at this time is to refer him to electrophysiology for possible CRT-D placement  His primary concern today is ongoing episodes of abdominal pain, sometimes triggered by meals although not always.  Pain seems to start in the left abdomen and then migrate across, described as burning.  He does have some associated nausea, improved with use of Zofran although this does tend to make him constipated.  He is taking this about once daily.  He is using a stool softener for the constipation and does report that he is having regular bowel movements.  He states  that the pain is spreading throughout the abdomen, both upper and lower.  He has continued also with omeprazole, has stopped Carafate at this time.  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  Ultimately he required a wound VAC and this has healed.  He is concerned that his present symptoms may be related to lingering infection.  He denies any groin pain, fever, chills  He continues to feel very fatigued  No increase in lower extremity edema, no vomiting, no blood or mucus in stool      No problem-specific Assessment & Plan notes found for this encounter.       Current medicines (including changes today)  Current Outpatient Medications   Medication Sig Dispense Refill   • docusate sodium (COLACE) 100 MG Cap Take 1 capsule by mouth 2 times a day as needed for Constipation. 90 capsule 1   • omeprazole (PRILOSEC) 20 MG delayed-release capsule Take 1 capsule by mouth 2 times a day. 60 capsule 2   • ondansetron (ZOFRAN) 4 MG Tab tablet Take 1 tablet by mouth every four hours as needed for Nausea/Vomiting. 30 tablet 1   • ELIQUIS 5 MG Tab TAKE 1 TABLET BY MOUTH TWICE A  tablet 3   • spironolactone (ALDACTONE) 25 MG Tab Take 1 tablet by mouth every day. 90 tablet 3   • digoxin (LANOXIN) 125 MCG Tab Take 1 tablet by mouth every day at 6 PM. 90 tablet 3   • metoprolol SR (TOPROL XL) 25 MG TABLET SR 24 HR Take 1 tablet by mouth every evening. 90 tablet 3   • ONETOUCH VERIO strip VERIO TEST STRIPS FOR GLUCOSE MONITORING 3 TIMES A DAY AND AS NEEDED *INS COVERS 100 PER 90 DAY     • Blood Glucose Monitoring Suppl (ONETOUCH VERIO) w/Device Kit 1 Device 3 times a day before meals. 1 Kit 1   • finasteride (PROSCAR) 5 MG Tab TAKE 1 TABLET BY MOUTH EVERY DAY 90 tablet 1   • colesevelam (WELCHOL) 625 MG Tab Take 3 Tabs by mouth 2 times a day, with meals. (Patient taking differently: Take 1,875  "mg by mouth every 48 hours.) 180 Tab 11   • Insulin Degludec (TRESIBA FLEXTOUCH) 200 UNIT/ML Solution Pen-injector Inject 12 Units under the skin every evening. 3 mL 11   • Blood Glucose Test Strips verio test strips for glucose monitoring TID and  Each 5   • hydroxyurea (HYDREA) 500 MG Cap Take 500 mg by mouth 2 Times a Day.     • tamsulosin (FLOMAX) 0.4 MG capsule Take 2 Caps by mouth every day. (Patient taking differently: Take 0.4-0.8 mg by mouth every evening.) 60 Cap 6   • aspirin EC (ECOTRIN) 81 MG Tablet Delayed Response Take 1 Tab by mouth every day. 30 Tab    • therapeutic multivitamin-minerals (THERAGRAN-M) Tab Take 1 Tab by mouth every day.     • sucralfate (CARAFATE) 1 GM/10ML Suspension Take 10 mL by mouth 4 Times a Day,Before Meals and at Bedtime. (Patient not taking: Reported on 6/28/2021) 414 mL 0   • oxyCODONE immediate-release (ROXICODONE) 5 MG Tab Take 10 mg by mouth every 6 hours as needed for Severe Pain. Indications: Chronic Pain (Patient not taking: Reported on 6/28/2021)       No current facility-administered medications for this visit.     He  has a past medical history of Anesthesia, Cancer ([facility]), Diabetes ([facility]), Family history of punctured lung (2002), Heart attack ([facility]) (03/2017), Hemorrhagic disorder ([facility]), High cholesterol, Ischemic cardiomyopathy, Kidney stones, Orthostatic hypotension (3/29/2018), Pain, Polycythemia vera(238.4), Stroke ([facility]) (2016), Urinary bladder disorder, and Vertigo.    ROS included above     Objective:     /84 (BP Location: Left arm, Patient Position: Sitting, BP Cuff Size: Large adult)   Pulse 79   Temp 35.9 °C (96.7 °F) (Temporal)   Resp 20   Ht 1.905 m (6' 3\")   Wt 85 kg (187 lb 6.3 oz)   SpO2 97%  Body mass index is 23.42 kg/m².     Physical Exam:  General: Alert, oriented in no acute distress.  Eye contact is good, speech is normal, affect calm  Lungs: clear to auscultation bilaterally, normal effort, no wheeze/ rhonchi/ rales.  CV: " regular rate and rhythm, S1, S2, no murmur  Abdomen: soft, nontender, nondistended, normal bowel sounds  Ext: no edema, color normal, vascularity normal, temperature normal    Assessment and Plan:   The following treatment plan was discussed  1. Left sided abdominal pain   recent hospitalization with acute onset of abdominal pain, nausea, vomiting.  He clinically improved during hospital stay with PPI.  Ultrasound had shown gallstones but HIDA scan was generally normal and surgical intervention was not pursued.  He continues to have ongoing complaints of abdominal pain, varying location, although he describes it as starting in the left lateral abdomen and spreading across.  He has had some difficulty with constipation which could be a contributing factor.  No blood or mucus in stool.  Up-to-date on colonoscopy.  Feels that he is getting some benefit with Zofran.  I am uncertain what may be causing his symptoms, we may need to refer him back to gastroenterology.  For now we will have him continue with the PPI, Zofran as needed, encourage MiraLAX for constipation.  Will obtain imaging as listed   2. Left lower quadrant abdominal pain  CT-ABDOMEN-PELVIS WITH   3. Nausea     4. SLE (systemic lupus erythematosus related syndrome) (HCC)          4. Paroxysmal A-fib (HCC)     5. Chronic systolic heart failure (HCC)   reasonably compensated on exam today, he will continue close follow-up with cardiology and has upcoming EP appointment       Followup: Pending imaging         Please note that this dictation was created using voice recognition software. I have worked with consultants from the vendor as well as technical experts from [facility] to optimize the interface. I have made every reasonable attempt to correct obvious errors, but I expect that there are errors of grammar and possibly content that I did not discover before finalizing the note.

## 2021-07-04 ENCOUNTER — HOSPITAL ENCOUNTER (OUTPATIENT)
Dept: RADIOLOGY | Facility: MEDICAL CENTER | Age: 71
End: 2021-07-04
Attending: NURSE PRACTITIONER
Payer: MEDICARE

## 2021-07-04 DIAGNOSIS — R10.32 LEFT LOWER QUADRANT ABDOMINAL PAIN: ICD-10-CM

## 2021-07-04 PROCEDURE — 700117 HCHG RX CONTRAST REV CODE 255: Performed by: NURSE PRACTITIONER

## 2021-07-04 PROCEDURE — 74177 CT ABD & PELVIS W/CONTRAST: CPT | Mod: ME

## 2021-07-04 RX ADMIN — IOHEXOL 25 ML: 240 INJECTION, SOLUTION INTRATHECAL; INTRAVASCULAR; INTRAVENOUS; ORAL at 08:25

## 2021-07-04 RX ADMIN — IOHEXOL 100 ML: 350 INJECTION, SOLUTION INTRAVENOUS at 08:25

## 2021-07-07 ENCOUNTER — OFFICE VISIT (OUTPATIENT)
Dept: CARDIOLOGY | Facility: MEDICAL CENTER | Age: 71
End: 2021-07-07
Payer: MEDICARE

## 2021-07-07 VITALS
SYSTOLIC BLOOD PRESSURE: 138 MMHG | HEART RATE: 78 BPM | HEIGHT: 75 IN | OXYGEN SATURATION: 97 % | BODY MASS INDEX: 23.38 KG/M2 | RESPIRATION RATE: 14 BRPM | DIASTOLIC BLOOD PRESSURE: 72 MMHG | WEIGHT: 188 LBS

## 2021-07-07 DIAGNOSIS — I25.5 ISCHEMIC CARDIOMYOPATHY: ICD-10-CM

## 2021-07-07 DIAGNOSIS — I48.0 PAROXYSMAL A-FIB (HCC): Chronic | ICD-10-CM

## 2021-07-07 LAB — EKG IMPRESSION: NORMAL

## 2021-07-07 PROCEDURE — 93000 ELECTROCARDIOGRAM COMPLETE: CPT | Performed by: INTERNAL MEDICINE

## 2021-07-07 PROCEDURE — 99215 OFFICE O/P EST HI 40 MIN: CPT | Mod: 25 | Performed by: INTERNAL MEDICINE

## 2021-07-07 ASSESSMENT — FIBROSIS 4 INDEX: FIB4 SCORE: 1.68

## 2021-07-07 NOTE — PROGRESS NOTES
Arrhythmia Clinic Note (New patient)     DOS: 7/7/2021    Referring physician: Eunice Law    Chief complaint/Reason for consult: CHF    HPI: 70-year-old man with history of ischemic cardiomyopathy status post myocardial infarction, reported history of paroxysmal atrial fibrillation, on Eliquis, history of diabetes and hypertension, hyperlipidemia, history of agent orange exposure, referred for evaluation for ICD implantation.  He endorses significant shortness of breath with exertion.  No syncope or presyncope.  He does feel like he is getting worse.  No chest pain on exertion currently, no palpitations.     ROS (+ highlighted in bold):  Constitutional: Fevers/chills/fatigue/weightloss  HEENT: Blurry vision/eye pain/sore throat/hearing loss  Respiratory: Shortness of breath/cough  Cardiovascular: Chest pain/palpitations/edema/orthopnea/syncope  GI: Nausea/vomitting/diarrhea  MSK: Arthralgias/myagias/muscle weakness  Skin: Rash/sores  Neurological: Numbness/tremors/vertigo  Endocrine: Excessive thirst/polyuria/cold intolerance/heat intolerance  Psych: Depression/anxiety    Past Medical History:   Diagnosis Date   • Anesthesia     resistant to pain meds   • Cancer (HCC)     polycythemia vera   • Diabetes (HCC)     insulin and oral meds   • Family history of punctured lung 2002    left lung   • Heart attack (HCC) 03/2017   • Hemorrhagic disorder (HCC)     on blood thinners   • High cholesterol    • Ischemic cardiomyopathy    • Kidney stones     hx of kidney stones   • Orthostatic hypotension 3/29/2018   • Pain     headache pain   • Polycythemia vera(238.4)    • Stroke (HCC) 2016    r/t afib; eye issues resolved afterward   • Urinary bladder disorder     enlarged prostate, weak stream, difficulty urinating   • Vertigo        Past Surgical History:   Procedure Laterality Date   • SPLIT THICKNESS SKIN GRAFT N/A 8/23/2020    Procedure: APPLICATION, GRAFT, SKIN, SPLIT-THICKNESS;  Surgeon: Elisa Craig M.D.;   Location: [facility];  Service: Plastics   • SKIN ABSCESS INCISION AND DRAINAGE Right 8/3/2020    Procedure: INCISION AND DRAINAGE - SCROTAL WOUND - WOUND VAC PLACEMENT;  Surgeon: Jaylen Hayes M.D.;  Location: [facility];  Service: Urology   • PB EXPLORE SCROTUM Right 7/31/2020    Procedure: EXPLORATION, SCROTUM - FOR WASH OUT OF SCROTAL WOUND & WOUND VAC PLACEMENT;  Surgeon: Ferny Rosales M.D.;  Location: [facility];  Service: Urology   • PB EXPLORE SCROTUM Right 7/29/2020    Procedure: EXPLORATION, SCROTUM - FOR I & D OF SCROTAL AND  GROIN WOUND;  Surgeon: Donta Viera M.D.;  Location: [facility];  Service: Urology   • PB INCIS/DRAIN SCROTUM/TESTIS,EPIDIDYM Right 7/25/2020    Procedure: INCISION AND DRAINAGE, SCROTUM;  Surgeon: Nick Negro M.D.;  Location: [facility];  Service: Urology   • TEMPORAL ARTERY BIOPSY Right 6/26/2018    Procedure: TEMPORAL ARTERY BIOPSY;  Surgeon: Rajendra Aguilar M.D.;  Location: SURGERY SAME DAY [facility];  Service: General   • CAROTID ENDARTERECTOMY Right 3/21/2018    Procedure: CAROTID ENDARTERECTOMY- W/EEG MONITORING;  Surgeon: Benny Morfin M.D.;  Location: [facility];  Service: General   • APPENDECTOMY     • CATH PLACEMENT      right arm   • LAMINOTOMY      diskectomy; C4   • OTHER CARDIAC SURGERY      stents x 3       Social History     Socioeconomic History   • Marital status:      Spouse name: Not on file   • Number of children: Not on file   • Years of education: Not on file   • Highest education level: Not on file   Occupational History   • Not on file   Tobacco Use   • Smoking status: Never Smoker   • Smokeless tobacco: Never Used   Vaping Use   • Vaping Use: Never used   Substance and Sexual Activity   • Alcohol use: No   • Drug use: No   • Sexual activity: Not on file   Other Topics Concern   • Not on file   Social History Narrative   • Not on file  "    Social Determinants of Health     Financial Resource Strain:    • Difficulty of Paying Living Expenses:    Food Insecurity:    • Worried About Running Out of Food in the Last Year:    • Ran Out of Food in the Last Year:    Transportation Needs:    • Lack of Transportation (Medical):    • Lack of Transportation (Non-Medical):    Physical Activity:    • Days of Exercise per Week:    • Minutes of Exercise per Session:    Stress:    • Feeling of Stress :    Social Connections:    • Frequency of Communication with Friends and Family:    • Frequency of Social Gatherings with Friends and Family:    • Attends Jew Services:    • Active Member of Clubs or Organizations:    • Attends Club or Organization Meetings:    • Marital Status:    Intimate Partner Violence:    • Fear of Current or Ex-Partner:    • Emotionally Abused:    • Physically Abused:    • Sexually Abused:        No family history on file.    Allergies   Allergen Reactions   • Doxycycline      Swelling lips and difficulties swallowing   • Beta Adrenergic Blockers Unspecified     dizziness   • Metformin Unspecified and Palpitations     Cardiac effects  \"Similar to a heart attack, I was hospitalized\"   • Simvastatin      Other reaction(s): Other (Specify with Comments)  Myalgias  Myalgias   • Statins [Hmg-Coa-R Inhibitors]      Muscle ache, joint pain       Current Outpatient Medications   Medication Sig Dispense Refill   • docusate sodium (COLACE) 100 MG Cap Take 1 capsule by mouth 2 times a day as needed for Constipation. 90 capsule 1   • omeprazole (PRILOSEC) 20 MG delayed-release capsule Take 1 capsule by mouth 2 times a day. 60 capsule 2   • ELIQUIS 5 MG Tab TAKE 1 TABLET BY MOUTH TWICE A  tablet 3   • spironolactone (ALDACTONE) 25 MG Tab Take 1 tablet by mouth every day. 90 tablet 3   • digoxin (LANOXIN) 125 MCG Tab Take 1 tablet by mouth every day at 6 PM. 90 tablet 3   • metoprolol SR (TOPROL XL) 25 MG TABLET SR 24 HR Take 1 tablet by mouth " "every evening. 90 tablet 3   • ONETOUCH VERIO strip VERIO TEST STRIPS FOR GLUCOSE MONITORING 3 TIMES A DAY AND AS NEEDED *INS COVERS 100 PER 90 DAY     • Blood Glucose Monitoring Suppl (ONETOUCH VERIO) w/Device Kit 1 Device 3 times a day before meals. 1 Kit 1   • finasteride (PROSCAR) 5 MG Tab TAKE 1 TABLET BY MOUTH EVERY DAY 90 tablet 1   • colesevelam (WELCHOL) 625 MG Tab Take 3 Tabs by mouth 2 times a day, with meals. (Patient taking differently: Take 1,875 mg by mouth every 48 hours.) 180 Tab 11   • Insulin Degludec (TRESIBA FLEXTOUCH) 200 UNIT/ML Solution Pen-injector Inject 12 Units under the skin every evening. 3 mL 11   • Blood Glucose Test Strips verio test strips for glucose monitoring TID and  Each 5   • hydroxyurea (HYDREA) 500 MG Cap Take 500 mg by mouth 2 Times a Day.     • tamsulosin (FLOMAX) 0.4 MG capsule Take 2 Caps by mouth every day. (Patient taking differently: Take 0.4-0.8 mg by mouth every evening.) 60 Cap 6   • aspirin EC (ECOTRIN) 81 MG Tablet Delayed Response Take 1 Tab by mouth every day. 30 Tab    • therapeutic multivitamin-minerals (THERAGRAN-M) Tab Take 1 Tab by mouth every day.     • ondansetron (ZOFRAN) 4 MG Tab tablet Take 1 tablet by mouth every four hours as needed for Nausea/Vomiting. (Patient not taking: Reported on 7/7/2021) 30 tablet 1     No current facility-administered medications for this visit.       Physical Exam:  Vitals:    07/07/21 1525   BP: 138/72   BP Location: Left arm   Patient Position: Sitting   BP Cuff Size: Adult   Pulse: 78   Resp: 14   SpO2: 97%   Weight: 85.3 kg (188 lb)   Height: 1.905 m (6' 3\")     General appearance: NAD, conversant   Eyes: anicteric sclerae, moist conjunctivae; no lid-lag; PERRLA  HENT: Atraumatic; oropharynx clear with moist mucous membranes and no mucosal ulcerations; normal hard and soft palate  Neck: Trachea midline; FROM, supple, no thyromegaly or lymphadenopathy  Lungs: CTA, with normal respiratory effort and no intercostal " retractions  CV: RRR, no MRGs, no JVD   Abdomen: Soft, non-tender; no masses or HSM  Extremities: No peripheral edema or extremity lymphadenopathy  Skin: Normal temperature, turgor and texture; no rash, ulcers or subcutaneous nodules  Psych: Appropriate affect, alert and oriented to person, place and time    Data:  Lipids:   Lab Results   Component Value Date/Time    CHOLSTRLTOT 145 01/20/2021 09:32 AM    TRIGLYCERIDE 110 01/20/2021 09:32 AM    HDL 33 (A) 01/20/2021 09:32 AM    LDL 90 01/20/2021 09:32 AM        BMP:  Lab Results   Component Value Date/Time    SODIUM 135 06/03/2021 0335    POTASSIUM 4.0 06/03/2021 0335    CHLORIDE 101 06/03/2021 0335    CO2 23 06/03/2021 0335    GLUCOSE 147 (H) 06/03/2021 0335    BUN 16 06/03/2021 0335    CREATININE 0.88 06/03/2021 0335    CALCIUM 9.1 06/03/2021 0335    ANION 11.0 06/03/2021 0335        TSH:   Lab Results   Component Value Date/Time    TSHULTRASEN 2.630 05/28/2021 2022        THYROXINE (T4):   No results found for: SHALOM     CBC:   Lab Results   Component Value Date/Time    WBC 4.4 (L) 06/03/2021 03:35 AM    RBC 5.01 06/03/2021 03:35 AM    HEMOGLOBIN 14.9 06/03/2021 03:35 AM    HEMATOCRIT 47.1 06/03/2021 03:35 AM    MCV 94.0 06/03/2021 03:35 AM    MCH 29.7 06/03/2021 03:35 AM    MCHC 31.6 (L) 06/03/2021 03:35 AM    RDW 63.0 (H) 06/03/2021 03:35 AM    PLATELETCT 205 06/03/2021 03:35 AM    MPV 12.1 06/03/2021 03:35 AM    NEUTSPOLYS 77.60 (H) 06/01/2021 04:42 AM    LYMPHOCYTES 8.20 (L) 06/01/2021 04:42 AM    MONOCYTES 8.40 06/01/2021 04:42 AM    EOSINOPHILS 3.40 06/01/2021 04:42 AM    BASOPHILS 1.30 06/01/2021 04:42 AM    IMMGRAN 1.10 (H) 06/01/2021 04:42 AM    NRBC 0.00 06/01/2021 04:42 AM    NEUTS 3.68 06/01/2021 04:42 AM    LYMPHS 0.39 (L) 06/01/2021 04:42 AM    MONOS 0.40 06/01/2021 04:42 AM    EOS 0.16 06/01/2021 04:42 AM    BASO 0.06 06/01/2021 04:42 AM    IMMGRANAB 0.05 06/01/2021 04:42 AM    NRBCAB 0.00 06/01/2021 04:42 AM        CBC w/o DIFF  Lab Results    Component Value Date/Time    WBC 4.4 (L) 06/03/2021 03:35 AM    RBC 5.01 06/03/2021 03:35 AM    HEMOGLOBIN 14.9 06/03/2021 03:35 AM    MCV 94.0 06/03/2021 03:35 AM    MCH 29.7 06/03/2021 03:35 AM    MCHC 31.6 (L) 06/03/2021 03:35 AM    RDW 63.0 (H) 06/03/2021 03:35 AM    MPV 12.1 06/03/2021 03:35 AM       Prior echo/stress results reviewed: EF 20%    EKG interpreted by me: SR, QRS 111ms    Impression/Plan:  1. Paroxysmal A-fib (HCC)  EKG   2. Ischemic cardiomyopathy  CL-IMPLANTABLE CARDIOVERTER DEFIBRILLATOR     1.  Ischemic cardiomyopathy, ejection fraction 20%  2.  Chronic systolic heart failure, near Heart Association class III  3.  Paroxysmal atrial fibrillation, apparently low burden, on Eliquis  4.  Coronary disease status post MI    -We discussed treatment options for his ischemic cardiomyopathy.  Given his persistently depressed ejection fraction despite medical therapy, he is a candidate for primary prevention ICD.  -I did assess his QRS width, unfortunately it is not very wide today and I do not think he will benefit from addition of an LV lead.  -We discussed dual-chamber ICD to screen for atrial fibrillation moving forward  -I did advise him that I do not expect the ICD to make him feel much different no day-to-day basis but rather to be protective of life-threatening arrhythmias.  He is understanding of this.  -The risk, benefits, and alternatives to ICD placement were discussed in great detail, specific risks mentioned including bleeding, infection, cardiac perforation with possible tamponade requiring pericardiocentesis or open heart surgery. The [place] patient decision making tool was used to decide on ICD implantation.  In addition the possibility of lead dislodgment, inappropriate shocks, pneumothorax, hemothorax were discussed. Also mentioned were the possibility of death, stroke, and myocardial infarction. The patient verbalized understanding of these potential complications and wishes to  proceed with this procedure.     Plan dual-chamber ICD implant, hold Eliquis for 2 days    Alberto Isaac MD  Cardiac Electrophysiology

## 2021-07-08 ENCOUNTER — TELEPHONE (OUTPATIENT)
Dept: CARDIOLOGY | Facility: MEDICAL CENTER | Age: 71
End: 2021-07-08

## 2021-07-08 NOTE — TELEPHONE ENCOUNTER
----- Message from Alberto Isaac M.D. sent at 7/7/2021  4:19 PM PDT -----  Please schedule ICD insert, can schedule with [facility], hold Eliquis for 2 days, thanks. Can we schedule 7/29 afternoon? Pt hoping to get it done before end of the month.  ALEXEY

## 2021-07-08 NOTE — TELEPHONE ENCOUNTER
Patient scheduled for ICD insert on 7-29-21 with Dr. Isaac. Patient has been instructed to check in at 12:00 for 2:00 case time. Hold Eliquis 2 days. Patient takes long acting Insulin pm only - will continue. Message sent to authorcuong. Gary with [employer] notified.

## 2021-07-09 RX ORDER — TAMSULOSIN HYDROCHLORIDE 0.4 MG/1
CAPSULE ORAL
Qty: 180 CAPSULE | Refills: 2 | Status: SHIPPED | OUTPATIENT
Start: 2021-07-09 | End: 2021-09-02

## 2021-07-14 ENCOUNTER — OFFICE VISIT (OUTPATIENT)
Dept: ENDOCRINOLOGY | Facility: MEDICAL CENTER | Age: 71
End: 2021-07-14
Attending: INTERNAL MEDICINE
Payer: MEDICARE

## 2021-07-14 VITALS
HEART RATE: 59 BPM | OXYGEN SATURATION: 92 % | DIASTOLIC BLOOD PRESSURE: 70 MMHG | WEIGHT: 183 LBS | SYSTOLIC BLOOD PRESSURE: 124 MMHG | BODY MASS INDEX: 22.75 KG/M2 | HEIGHT: 75 IN

## 2021-07-14 DIAGNOSIS — E78.5 DYSLIPIDEMIA: ICD-10-CM

## 2021-07-14 DIAGNOSIS — E11.8 TYPE 2 DIABETES MELLITUS WITH COMPLICATION, WITHOUT LONG-TERM CURRENT USE OF INSULIN (HCC): ICD-10-CM

## 2021-07-14 DIAGNOSIS — Z79.4 ENCOUNTER FOR LONG-TERM (CURRENT) USE OF INSULIN (HCC): ICD-10-CM

## 2021-07-14 DIAGNOSIS — Z78.9 STATIN INTOLERANCE: ICD-10-CM

## 2021-07-14 LAB
HBA1C MFR BLD: 7.4 % (ref 0–5.6)
INT CON NEG: NEGATIVE
INT CON POS: POSITIVE

## 2021-07-14 PROCEDURE — 83036 HEMOGLOBIN GLYCOSYLATED A1C: CPT | Performed by: INTERNAL MEDICINE

## 2021-07-14 PROCEDURE — 99212 OFFICE O/P EST SF 10 MIN: CPT | Performed by: INTERNAL MEDICINE

## 2021-07-14 PROCEDURE — 99214 OFFICE O/P EST MOD 30 MIN: CPT | Performed by: INTERNAL MEDICINE

## 2021-07-14 RX ORDER — DAPAGLIFLOZIN 10 MG/1
1 TABLET, FILM COATED ORAL DAILY
Qty: 30 TABLET | Refills: 11 | Status: SHIPPED | OUTPATIENT
Start: 2021-07-14 | End: 2021-09-02

## 2021-07-14 RX ORDER — LISINOPRIL 5 MG/1
TABLET ORAL
Status: ON HOLD | COMMUNITY
Start: 2021-07-10 | End: 2021-07-29

## 2021-07-14 ASSESSMENT — FIBROSIS 4 INDEX: FIB4 SCORE: 1.68

## 2021-07-14 NOTE — PROGRESS NOTES
CHIEF COMPLAINT: Patient is here for follow up of Type 2 Diabetes Mellitus    HPI:     Francesco Schulte is a 70 y.o. male with Type 2 Diabetes Mellitus here for follow up.        Labs from April 2, 2021 show A1c is 7.1%  Labs from 10/26/2020 HbA1c was 7.7%      He was previously seen by the PA  He has has a history of congestive heart failure with a non-preserved EF.  He also has P. Vera and CAD  He was previously taking Jardiance and Trulicity but cannot afford these medications he was also on Tresiba.    He has a history of intolerance of Metformin.    I prescribed him Farxiga but he stopped it claiming it caused diarrhea      He is now on Tresiba 10-12u daily and he stopped taking Farxiga due to diarrhea.    He did not bring  His meter despite repeated requests to do so      He has a history of hyperlipidemia with an LDL cholesterol of 94 on August 6, 2019   He cannot tolerate statins but is on Welchol  He is on an ACE inhibitor    Labs from January 2021 showed the following:  His LDL cholesterol is 90 with triglycerides 110,  Urine microalbumin creatinine ratio is 82  C-peptide is 3.1  TSH is normal at 1.5        Incidentally he was also diagnosed with a pancreatic cyst on his most recent hospital admission.  He has a 1.6 cm pancreatic tail cyst that was incidentally discovered on chest CTA to rule out pulmonary embolism  I advised him to follow up with his pcp for this issue        BG Diary:  Patient did not bring glucose meter despite repeated request to do so      Weight has been stable    Diabetes Complications   Retinopathy: No known retinopathy.  Last eye exam: He reports that he had an eye exam last year but we do not have those records  Neuropathy: Denies paresthesias or numbness in hands or feet. Denies any foot wounds.  Exercise: Minimal.  Diet: Fair.  Patient's medications, allergies, and social histories were reviewed and updated as appropriate.    ROS:     CONS:     No fever, no chills    EYES:     No diplopia, no blurry vision   CV:           No chest pain, no palpitations   PULM:     No SOB, no cough, no hemoptysis.   GI:            No nausea, no vomiting, no diarrhea, no constipation   ENDO:     No polyuria, no polydipsia, no heat intolerance, no cold intolerance       Past Medical History:  Problem List:  2021-05: Calculus of gallbladder  2021-05: Hypertensive urgency  2021-05: AP (abdominal pain)  2021-01: Community acquired pneumonia  2020-11: Drug-induced neutropenia ([facility])  2020-10: Insomnia due to medical condition  2020-10: Pyelonephritis with bacteremia   2020-10: Pericardial effusion  2020-09: QT prolongation  2020-09: Urethral irritation  2020-09: Medication side effect  2020-08: Nephrolithiasis  2020-08: Urinary retention  2020-08: History of TIA (transient ischemic attack)  2020-08: Advanced care planning/counseling discussion  2020-08: Constipation  2020-08: Age-related physical debility  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  2020-07: SLE (systemic lupus erythematosus related syndrome) ([facility])  2020-07: Pancreatic cyst  2020-07: Hyperglycemia  2020-07: Lip swelling  2020-07: Medication intolerance  2020-07: Generalized pain  2020-07: Elevated brain natriuretic peptide (BNP) level  2019-11: Lupus ([facility])  2019-05: Encounter for long-term (current) use of insulin ([facility])  2019-04: Indigestion  2019-01: Fatigue  2019-01: Shortness of breath  2019-01: Pain in the chest  2019-01: Erythromelalgia ([facility])  2019-01: Polycythemia vera ([facility])  2018-10: Multiple joint pain  2018-08: Vertigo  2018-08: Elevated lactic acid level  2018-08: Nausea  2018-04: Elevated sed rate- R/O PMR  2018-04: New daily persistent headache- R/O PMR; trial steroids  2018-04: Nonintractable episodic headache  2018-03: Orthostatic hypotension  2018-03: Stenosis of right carotid artery-Right CEA 3/21/18  2018-03: Essential hypertension  2018-03: Hypercoagulable state ([facility])  2018-03: Chronic headache  2018-03: Nausea &  vomiting  2018-03: Dizziness  2018-03: History of stroke- old right parietal/occipital  2018-03: Thrombocytopenia ([facility])  2018-03: Leukopenia  2018-03: Hypokalemia  2018-03: Carotid stenosis, 90% right carotid  2018-01: Chronic daily headache  2018-01: Obesity (BMI 30-39.9)  2017-09: Agent orange exposure  2017-09: Chronic pain syndrome  2017-09: Risk for falls  2017-08: Long term (current) use of anticoagulants [Z79.01]  2017-03: History of ST elevation myocardial infarction (STEMI)  2017-03: SIRS (systemic inflammatory response syndrome) ([facility])  2017-03: ARF (acute renal failure) ([facility])  2017-03: Leukocytosis  2017-03: Hyponatremia  2017-03: Coagulopathy ([facility])  2017-03: Chronic systolic heart failure ([facility])  2017-03: Type 2 diabetes mellitus with complication, without long-  term current use of insulin ([facility])  2017-03: CAD (coronary artery disease)  2017-03: Diabetes ([facility])  2017-03: Benign prostatic hyperplasia without lower urinary tract   symptoms  2017-03: Thrombocytosis ([facility])  2017-03: Dyslipidemia  2017-03: Paroxysmal A-fib ([facility])  2017-03: Statin intolerance  2017-03: MATTHIAS (acute kidney injury) ([facility])  2017-03: STEMI (ST elevation myocardial infarction) ([facility])  2016-12: Polycythemia  2016-12: A-fib ([facility])      Past Surgical History:  Past Surgical History:   Procedure Laterality Date   • SPLIT THICKNESS SKIN GRAFT N/A 8/23/2020    Procedure: APPLICATION, GRAFT, SKIN, SPLIT-THICKNESS;  Surgeon: Elisa Craig M.D.;  Location: SURGERY [facility];  Service: Plastics   • SKIN ABSCESS INCISION AND DRAINAGE Right 8/3/2020    Procedure: INCISION AND DRAINAGE - SCROTAL WOUND - WOUND VAC PLACEMENT;  Surgeon: Jaylen Hayes M.D.;  Location: SURGERY [facility];  Service: Urology   • PB EXPLORE SCROTUM Right 7/31/2020    Procedure: EXPLORATION, SCROTUM - FOR WASH OUT OF SCROTAL WOUND & WOUND VAC PLACEMENT;  Surgeon: Ferny Rosales M.D.;  Location: [facility];  Service: Urology   • PB EXPLORE  "SCROTUM Right 7/29/2020    Procedure: EXPLORATION, SCROTUM - FOR I & D OF SCROTAL AND  GROIN WOUND;  Surgeon: Donta Viera M.D.;  Location: SURGERY [facility];  Service: Urology   • PB INCIS/DRAIN SCROTUM/TESTIS,EPIDIDYM Right 7/25/2020    Procedure: INCISION AND DRAINAGE, SCROTUM;  Surgeon: Nick Negro M.D.;  Location: SURGERY [facility];  Service: Urology   • TEMPORAL ARTERY BIOPSY Right 6/26/2018    Procedure: TEMPORAL ARTERY BIOPSY;  Surgeon: Rajendra Aguilar M.D.;  Location: SURGERY SAME DAY [facility];  Service: General   • CAROTID ENDARTERECTOMY Right 3/21/2018    Procedure: CAROTID ENDARTERECTOMY- W/EEG MONITORING;  Surgeon: Benny Morfin M.D.;  Location: SURGERY [facility];  Service: General   • APPENDECTOMY     • CATH PLACEMENT      right arm   • LAMINOTOMY      diskectomy; C4   • OTHER CARDIAC SURGERY      stents x 3        Allergies:  Doxycycline, Beta adrenergic blockers, Metformin, Simvastatin, and Statins [hmg-coa-r inhibitors]     Social History:  Social History     Tobacco Use   • Smoking status: Never Smoker   • Smokeless tobacco: Never Used   Vaping Use   • Vaping Use: Never used   Substance Use Topics   • Alcohol use: No   • Drug use: No        Family History:   family history is not on file.      PHYSICAL EXAM:   OBJECTIVE:  Vital signs: /70 (BP Location: Left arm, Patient Position: Sitting, BP Cuff Size: Adult)   Pulse (!) 59   Ht 1.905 m (6' 3\")   Wt 83 kg (183 lb)   SpO2 92%   BMI 22.87 kg/m²   GENERAL: Well-developed, well-nourished in no apparent distress.   EYE:  No ocular asymmetry, PERRLA  HENT: Pink, moist mucous membranes.    NECK: No thyromegaly.   CARDIOVASCULAR:  No murmurs  LUNGS: Clear breath sounds  ABDOMEN: Soft, nontender   EXTREMITIES: No clubbing, cyanosis, or edema.   NEUROLOGICAL: No gross focal motor abnormalities   LYMPH: No cervical adenopathy seen  SKIN: No rashes, lesions.       Labs:  Lab Results   Component Value " Date/Time    HBA1C 7.4 (A) 07/14/2021 08:53 AM        Lab Results   Component Value Date/Time    WBC 4.4 (L) 06/03/2021 03:35 AM    RBC 5.01 06/03/2021 03:35 AM    HEMOGLOBIN 14.9 06/03/2021 03:35 AM    MCV 94.0 06/03/2021 03:35 AM    MCH 29.7 06/03/2021 03:35 AM    MCHC 31.6 (L) 06/03/2021 03:35 AM    RDW 63.0 (H) 06/03/2021 03:35 AM    MPV 12.1 06/03/2021 03:35 AM       Lab Results   Component Value Date/Time    SODIUM 135 06/03/2021 03:35 AM    POTASSIUM 4.0 06/03/2021 03:35 AM    CHLORIDE 101 06/03/2021 03:35 AM    CO2 23 06/03/2021 03:35 AM    ANION 11.0 06/03/2021 03:35 AM    GLUCOSE 147 (H) 06/03/2021 03:35 AM    BUN 16 06/03/2021 03:35 AM    CREATININE 0.88 06/03/2021 03:35 AM    CALCIUM 9.1 06/03/2021 03:35 AM    ASTSGOT 16 06/02/2021 04:35 AM    ALTSGPT 10 06/02/2021 04:35 AM    TBILIRUBIN 0.6 06/02/2021 04:35 AM    ALBUMIN 3.1 (L) 06/02/2021 04:35 AM    TOTPROTEIN 7.5 06/02/2021 04:35 AM    GLOBULIN 4.4 (H) 06/02/2021 04:35 AM    AGRATIO 0.7 06/02/2021 04:35 AM       Lab Results   Component Value Date/Time    CHOLSTRLTOT 159 08/06/2019 0803    TRIGLYCERIDE 99 08/06/2019 0803    HDL 45 08/06/2019 0803    LDL 94 08/06/2019 0803       Lab Results   Component Value Date/Time    MALBCRT 82 (H) 01/20/2021 09:31 AM    MICROALBUR 5.4 01/20/2021 09:31 AM        Lab Results   Component Value Date/Time    TSHULTRASEN 0.459 10/02/2020 0420     No results found for: FREEDIR  No results found for: FREET3  No results found for: THYSTIMIG        ASSESSMENT/PLAN:     1. Type 2 diabetes mellitus with complication, without long-term current use of insulin (HCC)  Fair control   A1c is up to 7.4%  Recommend that he restart Farxiga I explained him that this does not cause severe diarrhea, plus it has significant cardiovascular benefits in light of his history of congestive heart failure  We discussed that he should remain well-hydrated while taking Farxiga  Continue Tresiba 12 units nightly  Reminded patient to bring his  glucose meter'   reminded patient to get an updated eye exam  I want him to follow-up with our diabetes nurse in 3 months and see me again in 6 months    2. Dyslipidemia   Fair control  Patient does not want to start atorvastatin   continue WelChol repeat fasting lipids in 6 to 12 months    3. Statin Intolerance  Patient claims that he has statin intolerance but cannot provide a detailed statin treatment history    4. Encounter for long-term (current) use of insulin (HCC)  Patient is on long-term insulin therapy for type 2 diabetes      Return in about 3 months (around 10/14/2021).      Thank you kindly for allowing me to participate in the diabetes care plan for this patient.    Gamaliel Stephens MD, FACE, ECNU  01/04/21    CC:   ALVINO Harman

## 2021-07-16 ENCOUNTER — PRE-ADMISSION TESTING (OUTPATIENT)
Dept: ADMISSIONS | Facility: MEDICAL CENTER | Age: 71
End: 2021-07-16
Attending: INTERNAL MEDICINE
Payer: MEDICARE

## 2021-07-16 DIAGNOSIS — Z01.810 PRE-OPERATIVE CARDIOVASCULAR EXAMINATION: ICD-10-CM

## 2021-07-16 DIAGNOSIS — Z01.812 PRE-OPERATIVE LABORATORY EXAMINATION: ICD-10-CM

## 2021-07-27 ENCOUNTER — PRE-ADMISSION TESTING (OUTPATIENT)
Dept: ADMISSIONS | Facility: MEDICAL CENTER | Age: 71
End: 2021-07-27
Attending: INTERNAL MEDICINE
Payer: MEDICARE

## 2021-07-27 DIAGNOSIS — E11.8 TYPE 2 DIABETES MELLITUS WITH COMPLICATION, WITHOUT LONG-TERM CURRENT USE OF INSULIN (HCC): ICD-10-CM

## 2021-07-27 DIAGNOSIS — Z01.810 PRE-OPERATIVE CARDIOVASCULAR EXAMINATION: ICD-10-CM

## 2021-07-27 DIAGNOSIS — Z01.812 PRE-OPERATIVE LABORATORY EXAMINATION: ICD-10-CM

## 2021-07-27 LAB
ALBUMIN SERPL BCP-MCNC: 4.1 G/DL (ref 3.2–4.9)
ALBUMIN/GLOB SERPL: 0.8 G/DL
ALP SERPL-CCNC: 61 U/L (ref 30–99)
ALT SERPL-CCNC: 19 U/L (ref 2–50)
ANION GAP SERPL CALC-SCNC: 10 MMOL/L (ref 7–16)
AST SERPL-CCNC: 19 U/L (ref 12–45)
BILIRUB SERPL-MCNC: 0.7 MG/DL (ref 0.1–1.5)
BUN SERPL-MCNC: 20 MG/DL (ref 8–22)
CALCIUM SERPL-MCNC: 10 MG/DL (ref 8.5–10.5)
CHLORIDE SERPL-SCNC: 102 MMOL/L (ref 96–112)
CO2 SERPL-SCNC: 26 MMOL/L (ref 20–33)
CREAT SERPL-MCNC: 1.04 MG/DL (ref 0.5–1.4)
EKG IMPRESSION: NORMAL
ERYTHROCYTE [DISTWIDTH] IN BLOOD BY AUTOMATED COUNT: 72 FL (ref 35.9–50)
GLOBULIN SER CALC-MCNC: 5 G/DL (ref 1.9–3.5)
GLUCOSE SERPL-MCNC: 219 MG/DL (ref 65–99)
HCT VFR BLD AUTO: 52.7 % (ref 42–52)
HGB BLD-MCNC: 17.2 G/DL (ref 14–18)
INR PPP: 1.28 (ref 0.87–1.13)
MCH RBC QN AUTO: 32.3 PG (ref 27–33)
MCHC RBC AUTO-ENTMCNC: 32.6 G/DL (ref 33.7–35.3)
MCV RBC AUTO: 99.1 FL (ref 81.4–97.8)
PLATELET # BLD AUTO: 229 K/UL (ref 164–446)
PMV BLD AUTO: 11.6 FL (ref 9–12.9)
POTASSIUM SERPL-SCNC: 4.6 MMOL/L (ref 3.6–5.5)
PROT SERPL-MCNC: 9.1 G/DL (ref 6–8.2)
PROTHROMBIN TIME: 15.6 SEC (ref 12–14.6)
RBC # BLD AUTO: 5.32 M/UL (ref 4.7–6.1)
SARS-COV-2 RNA RESP QL NAA+PROBE: NOTDETECTED
SODIUM SERPL-SCNC: 138 MMOL/L (ref 135–145)
SPECIMEN SOURCE: NORMAL
WBC # BLD AUTO: 5.9 K/UL (ref 4.8–10.8)

## 2021-07-27 PROCEDURE — U0005 INFEC AGEN DETEC AMPLI PROBE: HCPCS

## 2021-07-27 PROCEDURE — 80053 COMPREHEN METABOLIC PANEL: CPT

## 2021-07-27 PROCEDURE — 36415 COLL VENOUS BLD VENIPUNCTURE: CPT

## 2021-07-27 PROCEDURE — 93010 ELECTROCARDIOGRAM REPORT: CPT | Performed by: INTERNAL MEDICINE

## 2021-07-27 PROCEDURE — 85610 PROTHROMBIN TIME: CPT

## 2021-07-27 PROCEDURE — 93005 ELECTROCARDIOGRAM TRACING: CPT

## 2021-07-27 PROCEDURE — C9803 HOPD COVID-19 SPEC COLLECT: HCPCS

## 2021-07-27 PROCEDURE — U0003 INFECTIOUS AGENT DETECTION BY NUCLEIC ACID (DNA OR RNA); SEVERE ACUTE RESPIRATORY SYNDROME CORONAVIRUS 2 (SARS-COV-2) (CORONAVIRUS DISEASE [COVID-19]), AMPLIFIED PROBE TECHNIQUE, MAKING USE OF HIGH THROUGHPUT TECHNOLOGIES AS DESCRIBED BY CMS-2020-01-R: HCPCS

## 2021-07-27 PROCEDURE — 85027 COMPLETE CBC AUTOMATED: CPT

## 2021-07-28 NOTE — ED PROVIDER NOTES
"ED Provider Note    ED Provider Note    Scribed for Rc Mcdermott MD by Rc Mcdermott. 8/9/2018, 1:00 AM.    Primary care provider: ALVINO Harman  Means of arrival: Private  History obtained from: Patient and SO  History limited by: None    CHIEF COMPLAINT  Chief Complaint   Patient presents with   • Loss of Consciousness     the the wife brought the pt to the ed as the pt is c/o severe nausea and dizziness w/ altered loc w/o inj or neuro changes. pt has a sig pmh and is on chr blood thinners.        HPI  Francesco Schulte is a 67 y.o. male who presents to the Emergency Department for evaluation of vertigo and vomiting.  Patient relates a spinning sensation, no history of syncope.  He relates associated severe nausea and vomiting, \"nonstop\" for the last 2 hours.  This occurred to rest while driving on the highway.  No loss of conscious.  Patient denies any headache, no chest pain, he does note generalized abdominal discomfort associated with nausea and vomiting.  Vertigo is so severe he is unable to ambulate and was helped out of the car by emergency department staff.  Patient does have an impressive medical history including hypercoagulable state secondary to polycythemia, currently anticoagulated on Eliquis.  He has history of previous CVA and MI.  No fever, no particular ill contacts, abrupt onset of symptoms without clear alleviating or exacerbating factors    REVIEW OF SYSTEMS  Pertinent positives include history of BPH and sensation of urinary bladder \"fullness\" with inability to void acutely, nausea, vomiting, vertiginous sensation, acutely nonambulatory, lightheaded sensation, generalized fatigue. Pertinent negatives include no chest pain, no dyspnea, no focal weakness or numbness, no fever, no particular ill contacts.  All other systems reviewed and negative.    PAST MEDICAL HISTORY   has a past medical history of Anesthesia; Cancer (HCC); Diabetes (HCC); Family history of " "punctured lung (2002); Heart attack (HCC) (03/2017); Hemorrhagic disorder ([facility]); High cholesterol; Ischemic cardiomyopathy; Kidney stones; Orthostatic hypotension (3/29/2018); Pain; Polycythemia vera(238.4); Stroke ([facility]) (2016); Urinary bladder disorder; and Vertigo.    SURGICAL HISTORY   has a past surgical history that includes other cardiac surgery; cath placement; laminotomy; appendectomy; carotid endarterectomy (Right, 3/21/2018); and temporal artery biopsy (Right, 6/26/2018).    SOCIAL HISTORY  Social History   Substance Use Topics   • Smoking status: Never Smoker   • Smokeless tobacco: Never Used   • Alcohol use No      History   Drug Use No       FAMILY HISTORY  History reviewed. No pertinent family history.  Noncontributory given age    CURRENT MEDICATIONS  Home Medications     Reviewed by Soheila Be R.N. (Registered Nurse) on 08/09/18 at 0115  Med List Status: Partial   Medication Last Dose Status   apixaban (ELIQUIS) 5mg Tab 7/24/2018 Active   aspirin EC (ECOTRIN) 81 MG Tablet Delayed Response  Active   colesevelam (WELCHOL) 625 MG Tab 7/24/2018 Active   finasteride (PROSCAR) 5 MG Tab 7/24/2018 Active   glipiZIDE (GLUCOTROL) 10 MG Tab 7/24/2018 Active   hydroxyurea (HYDREA) 500 MG Cap 6/24/2018 Active   insulin glargine (LANTUS) 100 UNIT/ML Solution 7/24/2018 Active   tamsulosin (FLOMAX) 0.4 MG capsule 7/24/2018 Active   therapeutic multivitamin-minerals (THERAGRAN-M) Tab 7/24/2018 Active                ALLERGIES  Allergies   Allergen Reactions   • Beta Adrenergic Blockers Unspecified     dizziness   • Metformin Unspecified     \"Similar to a heart attack, I was hospitalized\"   • Simvastatin      Myalgias   • Statins [Hmg-Coa-R Inhibitors]      Muscle ache, joint pain       PHYSICAL EXAM  VITAL SIGNS: BP (!) 191/91   Pulse 62   Temp 36.2 °C (97.2 °F)   Resp 19   Ht 1.905 m (6' 3\")   Wt 102.1 kg (225 lb)   SpO2 100%   BMI 28.12 kg/m²     General: Alert, severe acute distress  Skin: Warm, moist, " pale  Head: Normocephalic, atraumatic  Neck: Trachea midline, no tenderness  Eye: PERRL, extraocular movements are intact, there is fatigable nystagmus with the fast component to the right lasting approximately 5 seconds normal conjunctiva  ENMT: Oral mucosa pink and very dry, no pharyngeal erythema or exudate  Cardiovascular: S1, S2 regular rate and rhythm, No murmur, Normal peripheral perfusion; no peripheral edema  Respiratory: Lungs CTA, respirations are non-labored, breath sounds are equal  Gastrointestinal: Soft, nontender, non distended, normoactive bowel sounds.  No guarding, no rebound, no rigidity.  Musculoskeletal: No swelling, no deformity  Neurological: Alert and oriented to person, place, time, and situation.  Upper and lower extremity strength and sensation are 5/5 and symmetrical bilaterally.  Cranial nerves II through XII grossly intact.  No pronator drift, downgoing Babinski signs, 2+ symmetrical patellar reflexes.  Unable to assess gait, patient is unable to ambulate at this time.  Lymphatics: No lymphadenopathy  Psychiatric: Cooperative, very anxious but otherwise appropriate mood & affect      DIAGNOSTIC STUDIES/PROCEDURES    LABS  Results for orders placed or performed during the hospital encounter of 08/09/18   CBC WITH DIFFERENTIAL   Result Value Ref Range    WBC 4.1 (L) 4.8 - 10.8 K/uL    RBC 4.60 (L) 4.70 - 6.10 M/uL    Hemoglobin 13.8 (L) 14.0 - 18.0 g/dL    Hematocrit 44.2 42.0 - 52.0 %    MCV 96.1 81.4 - 97.8 fL    MCH 30.0 27.0 - 33.0 pg    MCHC 31.2 (L) 33.7 - 35.3 g/dL    RDW 56.9 (H) 35.9 - 50.0 fL    Platelet Count 223 164 - 446 K/uL    MPV 11.0 9.0 - 12.9 fL    Nucleated RBC 0.00 /100 WBC    NRBC (Absolute) 0.00 K/uL    Neutrophils-Polys 75.00 (H) 44.00 - 72.00 %    Lymphocytes 11.00 (L) 22.00 - 41.00 %    Monocytes 13.00 0.00 - 13.40 %    Eosinophils 0.00 0.00 - 6.90 %    Basophils 0.00 0.00 - 1.80 %    Neutrophils (Absolute) 3.12 1.82 - 7.42 K/uL    Lymphs (Absolute) 0.45 (L)  1.00 - 4.80 K/uL    Monos (Absolute) 0.53 0.00 - 0.85 K/uL    Eos (Absolute) 0.00 0.00 - 0.51 K/uL    Baso (Absolute) 0.00 0.00 - 0.12 K/uL   COMP METABOLIC PANEL   Result Value Ref Range    Sodium 135 135 - 145 mmol/L    Potassium 3.7 3.6 - 5.5 mmol/L    Chloride 103 96 - 112 mmol/L    Co2 20 20 - 33 mmol/L    Anion Gap 12.0 (H) 0.0 - 11.9    Glucose 312 (H) 65 - 99 mg/dL    Bun 18 8 - 22 mg/dL    Creatinine 1.06 0.50 - 1.40 mg/dL    Calcium 9.3 8.4 - 10.2 mg/dL    AST(SGOT) 21 12 - 45 U/L    ALT(SGPT) 21 2 - 50 U/L    Alkaline Phosphatase 45 30 - 99 U/L    Total Bilirubin 0.9 0.1 - 1.5 mg/dL    Albumin 4.5 3.2 - 4.9 g/dL    Total Protein 7.5 6.0 - 8.2 g/dL    Globulin 3.0 1.9 - 3.5 g/dL    A-G Ratio 1.5 g/dL   TROPONIN   Result Value Ref Range    Troponin I 0.03 0.00 - 0.04 ng/mL   PROTHROMBIN TIME   Result Value Ref Range    PT 15.3 (H) 12.0 - 14.6 sec    INR 1.22 (H) 0.87 - 1.13   APTT   Result Value Ref Range    APTT 30.5 24.7 - 36.0 sec   LACTIC ACID   Result Value Ref Range    Lactic Acid 3.6 (H) 0.5 - 2.0 mmol/L   URINALYSIS CULTURE, IF INDICATED   Result Value Ref Range    Color Yellow     Character Clear     Specific Gravity 1.025 <1.035    Ph 5.5 5.0 - 8.0    Glucose >=1000 (A) Negative mg/dL    Ketones Trace (A) Negative mg/dL    Protein 30 (A) Negative mg/dL    Bilirubin Negative Negative    Nitrite Negative Negative    Leukocyte Esterase Negative Negative    Occult Blood Small (A) Negative    Micro Urine Req Microscopic    LIPASE   Result Value Ref Range    Lipase 47 7 - 58 U/L   URINE MICROSCOPIC (W/UA)   Result Value Ref Range    WBC 0-2 (A) /hpf    RBC 2-5 (A) /hpf    Bacteria Negative None /hpf    Epithelial Cells Negative Few /hpf    Mucous Threads Rare /hpf   DIFFERENTIAL MANUAL   Result Value Ref Range    Bands-Stabs 1.00 0.00 - 10.00 %    Manual Diff Status PERFORMED    PLATELET ESTIMATE   Result Value Ref Range    Plt Estimation Normal    MORPHOLOGY   Result Value Ref Range    RBC Morphology  Normal    ESTIMATED GFR   Result Value Ref Range    GFR If African American >60 >60 mL/min/1.73 m 2    GFR If Non African American >60 >60 mL/min/1.73 m 2   LACTIC ACID   Result Value Ref Range    Lactic Acid 3.0 (H) 0.5 - 2.0 mmol/L   ACCU-CHEK GLUCOSE   Result Value Ref Range    Glucose - Accu-Ck 346 (H) 65 - 99 mg/dL   EKG (NOW)   Result Value Ref Range    Report       [facility] Emergency Dept.    Test Date:  2018  Pt Name:    MARZENA ROMEO             Department: [facility]  MRN:        [id number]                      [facility]  Gender:     Male                         Technician: PIPER  :        1950                   Requested By:FERNANDA SANCHEZ  Order #:    [id number]                    Reading MD:    Measurements  Intervals                                Axis  Rate:       67                           P:          41  MT:         165                          QRS:        -35  QRSD:       123                          T:          109  QT:         420  QTc:        444    Interpretive Statements  Sinus rhythm  Left bundle branch block  Compared to ECG 2018 09:14:23  Left bundle-branch block now present  Left anterior fascicular block no longer present  Poor R-wave progression no longer present  ST (T wave) deviation no longer present       All labs reviewed by me lactic acidosis, improving not resolved, hyperglycemia with mildly elevated anion gap but normal acetone, minimal hematuria, otherwise unremarkable.    EKG  12 Lead EKG obtained at 0043 and interpreted by me to show:  Rhythm: Normal sinus rhythm   Rate: 67  Axis: Normal  Intervals: Normal  Q Waves: Normal  No diagnostic ST segment elevation  Left bundle branch block  Clinical Impression: Normal EKG  Compared to previous EKG dated March 10 of this year patient's incomplete left bundle branch block is now a left bundle branch block.    RADIOLOGY  CT-HEAD W/O   Final Result      1.  Diffuse atrophy and  "white matter changes.   2.  No acute intracranial hemorrhage or territorial infarct.   3.  Chronic RIGHT occipital infarct.      DX-CHEST-PORTABLE (1 VIEW)   Final Result      No acute cardiopulmonary disease.      CT-CTA HEAD WITH & W/O-POST PROCESS    (Results Pending)   CT-CTA NECK WITH & W/O-POST PROCESSING    (Results Pending)     The radiologist's interpretation of all radiological studies have been reviewed by me.    COURSE & MEDICAL DECISION MAKING  Pertinent Labs & Imaging studies reviewed. (See chart for details)    1:00 AM - Patient seen and examined at bedside. Patient will be treated with fluid resuscitation, Zofran, Antivert. Ordered metabolic workup including lactic acid and acetone, EKG, troponin, CT of brain, x-ray of chest to evaluate his symptoms. The differential diagnoses include but are not limited to: Peripheral vertigo, dehydration, aspiration, central vertigo, cardiac dysrhythmia, ACS, electrolyte abnormality    0200: Patient reassessed.  He is feeling drowsy after the Antivert but easily arousable to voice, GCS is 14.  He is feeling much better.  Nausea resolved, vertigo much improved.  Repeat exam demonstrates again fatigable nystagmus with the faster component to the right, lasting approximately 5 seconds.  Pupils remain equal round and reactive.  I have updated the patient and spouse with the results thus far, awaiting imaging studies at this time.    BP (!) 191/91   Pulse 62   Temp 36.2 °C (97.2 °F)   Resp 19   Ht 1.905 m (6' 3\")   Wt 102.1 kg (225 lb)   SpO2 100%   BMI 28.12 kg/m²   0320: I spoke with the hospitalist Dr. Leal who concurs with plan and accepts the patient to his service.  Recommend stat CTA head and neck and will order MRI for the morning    The total critical care time on this patient is 35 minutes, resuscitating patient, speaking with admitting physician, and deciphering test results. This 40 minutes is exclusive of separately billable procedures.    Decision " Making:  This is a 67 y.o. year old male who presents with severe vertigo to the extent he is unable to stand or ambulate with associated nausea and vomiting and feeling lightheaded.  No actual syncope.  Patient has had somewhat similar symptoms in the past thought to be secondary to peripheral vertigo.  He does however have history of hypercoagulable state with history of both MI and CVA.  EKG demonstrates an unchanged left bundle branch block from previous, troponin is unremarkable, given no actual chest pain or dyspnea, this is unlikely to represent acute coronary syndrome though given his age and risk factors and bundle branch block on EKG his HEART score is 5, moderate risk.  He has no focal neurologic deficits, is nystagmus is fatigable, however given he is still unable to ambulate given the severity of his vertigo I do think is reasonable to obtain neuroimaging.  Noncontrast CT demonstrates no acute bleed.  I spoke with the hospitalist who recommends CT angiogram of head and neck to evaluate further given he does have a history of carotid stenosis and hypercoagulable state as well as MRI which will be performed in the morning to evaluate further.  Patient is obviously very dehydrated, hyperglycemic but not ketotic.  Aggressive fluid resuscitation initiated here in the ED.  Additionally, he has acute urinary retention, 330 cc of urinary output immediately after Andres catheter placed.    Patient is admitted to the care of Dr. Leal the hospitalist in improved but guarded condition    FINAL IMPRESSION  1. Acute onset of severe vertigo    2. Elevated lactic acid level    3. Dehydration    4. Nausea and vomiting in adult    5. Near syncope    6. Acute urinary retention    7. Unable to ambulate    8. Hyperglycemia without ketosis          IRc (Susanna), am scribing for, and in the presence of, Rc Mcdermott MD.    Electronically signed by: Rc Mcdermott (Susanna), 8/9/2018    TRAMAINE  Rc Mcdermott MD personally performed the services described in this documentation, as scribed by Rc Mcdermott in my presence, and it is both accurate and complete    The note accurately reflects work and decisions made by me.  Rc Mcdermott  8/9/2018  4:07 AM   Body Location Override (Optional - Billing Will Still Be Based On Selected Body Map Location If Applicable): nasal tip Mohs Case Number: [id number] Date Of Previous Biopsy (Optional): 06/22/2021 Biopsy Photograph Reviewed: Yes Referring Physician (Optional): Dr. Silverio Ortiz Consent Type: Consent 1 (Standard) Eye Shield Used: No Initial Size Of Lesion: 0.8 X Size Of Lesion In Cm (Optional): 0.7 Number Of Stages: 1 Primary Defect Length In Cm (Final Defect Size - Required For Flaps/Grafts): 0 Repair Type: No repair - secondary intention Oculoplastic Surgeon Procedure Text (A): After obtaining clear surgical margins the patient was sent to oculoplastics for surgical repair.  The patient understands they will receive post-surgical care and follow-up from the referring physician's office. Oculoplastic Surgeon Procedure Text (B): After obtaining clear surgical margins the patient was sent to oculoplastics for surgical repair.  The patient understands they will receive post-surgical care and follow-up from the referring physician's office. Otolaryngologist Procedure Text (A): After obtaining clear surgical margins the patient was sent to otolaryngology for surgical repair.  The patient understands they will receive post-surgical care and follow-up from the referring physician's office. Otolaryngologist Procedure Text (B): After obtaining clear surgical margins the patient was sent to otolaryngology for surgical repair.  The patient understands they will receive post-surgical care and follow-up from the referring physician's office. Plastic Surgeon Procedure Text (A): After obtaining clear surgical margins the patient was sent to plastics for surgical repair.  The patient understands they will receive post-surgical care and follow-up from the referring physician's office. Plastic Surgeon Procedure Text (B): After obtaining clear surgical margins the patient was sent to plastics for surgical repair.  The patient understands they will receive post-surgical care and follow-up from the referring physician's office. Mid-Level Procedure Text (A): After obtaining clear surgical margins the patient was sent to a mid-level provider for surgical repair.  The patient understands they will receive post-surgical care and follow-up from the mid-level provider. Mid-Level Procedure Text (B): After obtaining clear surgical margins the patient was sent to a mid-level provider for surgical repair.  The patient understands they will receive post-surgical care and follow-up from the mid-level provider. Provider Procedure Text (A): After obtaining clear surgical margins the defect was repaired by another provider. Asc Procedure Text (A): After obtaining clear surgical margins the patient was sent to an ASC for surgical repair.  The patient understands they will receive post-surgical care and follow-up from the ASC physician. Asc Procedure Text (B): After obtaining clear surgical margins the patient was sent to an ASC for surgical repair.  The patient understands they will receive post-surgical care and follow-up from the ASC physician. Suturegard Retention Suture: 2-0 Nylon Retention Suture Bite Size: 3 mm Length To Time In Minutes Device Was In Place: 10 Undermining Type: Entire Wound Debridement Text: The wound edges were debrided prior to proceeding with the closure to facilitate wound healing. Helical Rim Text: The closure involved the helical rim. Vermilion Border Text: The closure involved the vermilion border. Nostril Rim Text: The closure involved the nostril rim. Retention Suture Text: Retention sutures were placed to support the closure and prevent dehiscence. Location Indication Override (Is Already Calculated Based On Selected Body Location): Area H Area H Indication Text: Tumors in this location are included in Area H (eyelids, eyebrows, nose, lips, chin, ear, pre-auricular, post-auricular, temple, genitalia, hands, feet, ankles and areola).  Tissue conservation is critical in these anatomic locations. Area M Indication Text: Tumors in this location are included in Area M (cheek, forehead, scalp, neck, jawline and pretibial skin).  Mohs surgery is indicated for tumors in these anatomic locations. Area L Indication Text: Tumors in this location are included in Area L (trunk and extremities).  Mohs surgery is indicated for larger tumors, or tumors with aggressive histologic features, in these anatomic locations. Depth Of Tumor Invasion (For Histology): tumor not visualized (deep and peripheral margins are clear of tumor) Perineural Invasion (For Histology - Be Specific If Possible): absent Stage 1: Number Of Blocks?: 2 Surgical Defect Length In Cm (Optional): 0.9 Special Stains Stage 1 - Results: Base On Clearance Noted Above Stage 2: Additional Anesthesia Type: 1% lidocaine with 1:100,000 epinephrine and a 1:10 solution of 8.4% sodium bicarbonate Stage 3: Additional Anesthesia Type: 1% lidocaine with epinephrine Include Tumor Staging In Mohs Note?: Please Select the Appropriate Response Staging Info: By selecting yes to the question above you will include information on AJCC 8 tumor staging in your Mohs note. Information on tumor staging will be automatically added for SCCs on the head and neck. AJCC 8 includes tumor size, tumor depth, perineural involvement and bone invasion. Tumor Depth: Less than 6mm from granular layer and no invasion beyond the subcutaneous fat Medical Necessity Statement: Based on my medical judgement, Mohs surgery is the most appropriate treatment for this cancer compared to other treatments. Alternatives Discussed Intro (Do Not Add Period): I discussed alternative treatments to Mohs surgery and specifically discussed the risks and benefits of Consent 1/Introductory Paragraph: The rationale for Mohs was explained to the patient and consent was obtained. The risks, benefits and alternatives to therapy were discussed in detail. Specifically, the risks of infection, scarring, bleeding, prolonged wound healing, incomplete removal, allergy to anesthesia, nerve injury and recurrence were addressed. Prior to the procedure, the treatment site was clearly identified and confirmed by the patient. All components of Universal Protocol/PAUSE Rule completed. Consent 2/Introductory Paragraph: Mohs surgery was explained to the patient and consent was obtained. The risks, benefits and alternatives to therapy were discussed in detail. Specifically, the risks of infection, scarring, bleeding, prolonged wound healing, incomplete removal, allergy to anesthesia, nerve injury and recurrence were addressed. Prior to the procedure, the treatment site was clearly identified and confirmed by the patient. All components of Universal Protocol/PAUSE Rule completed. Consent 3/Introductory Paragraph: I gave the patient a chance to ask questions they had about the procedure.  Following this I explained the Mohs procedure and consent was obtained. The risks, benefits and alternatives to therapy were discussed in detail. Specifically, the risks of infection, scarring, bleeding, prolonged wound healing, incomplete removal, allergy to anesthesia, nerve injury and recurrence were addressed. Prior to the procedure, the treatment site was clearly identified and confirmed by the patient. All components of Universal Protocol/PAUSE Rule completed. Consent (Temporal Branch)/Introductory Paragraph: The rationale for Mohs was explained to the patient and consent was obtained. The risks, benefits and alternatives to therapy were discussed in detail. Specifically, the risks of damage to the temporal branch of the facial nerve, infection, scarring, bleeding, prolonged wound healing, incomplete removal, allergy to anesthesia, and recurrence were addressed. Prior to the procedure, the treatment site was clearly identified and confirmed by the patient. All components of Universal Protocol/PAUSE Rule completed. Consent (Marginal Mandibular)/Introductory Paragraph: The rationale for Mohs was explained to the patient and consent was obtained. The risks, benefits and alternatives to therapy were discussed in detail. Specifically, the risks of damage to the marginal mandibular branch of the facial nerve, infection, scarring, bleeding, prolonged wound healing, incomplete removal, allergy to anesthesia, and recurrence were addressed. Prior to the procedure, the treatment site was clearly identified and confirmed by the patient. All components of Universal Protocol/PAUSE Rule completed. Consent (Spinal Accessory)/Introductory Paragraph: The rationale for Mohs was explained to the patient and consent was obtained. The risks, benefits and alternatives to therapy were discussed in detail. Specifically, the risks of damage to the spinal accessory nerve, infection, scarring, bleeding, prolonged wound healing, incomplete removal, allergy to anesthesia, and recurrence were addressed. Prior to the procedure, the treatment site was clearly identified and confirmed by the patient. All components of Universal Protocol/PAUSE Rule completed. Consent (Near Eyelid Margin)/Introductory Paragraph: The rationale for Mohs was explained to the patient and consent was obtained. The risks, benefits and alternatives to therapy were discussed in detail. Specifically, the risks of ectropion or eyelid deformity, infection, scarring, bleeding, prolonged wound healing, incomplete removal, allergy to anesthesia, nerve injury and recurrence were addressed. Prior to the procedure, the treatment site was clearly identified and confirmed by the patient. All components of Universal Protocol/PAUSE Rule completed. Consent (Ear)/Introductory Paragraph: The rationale for Mohs was explained to the patient and consent was obtained. The risks, benefits and alternatives to therapy were discussed in detail. Specifically, the risks of ear deformity, infection, scarring, bleeding, prolonged wound healing, incomplete removal, allergy to anesthesia, nerve injury and recurrence were addressed. Prior to the procedure, the treatment site was clearly identified and confirmed by the patient. All components of Universal Protocol/PAUSE Rule completed. Consent (Nose)/Introductory Paragraph: The rationale for Mohs was explained to the patient and consent was obtained. The risks, benefits and alternatives to therapy were discussed in detail. Specifically, the risks of nasal deformity, changes in the flow of air through the nose, infection, scarring, bleeding, prolonged wound healing, incomplete removal, allergy to anesthesia, nerve injury and recurrence were addressed. Prior to the procedure, the treatment site was clearly identified and confirmed by the patient. All components of Universal Protocol/PAUSE Rule completed. Consent (Lip)/Introductory Paragraph: The rationale for Mohs was explained to the patient and consent was obtained. The risks, benefits and alternatives to therapy were discussed in detail. Specifically, the risks of lip deformity, changes in the oral aperture, infection, scarring, bleeding, prolonged wound healing, incomplete removal, allergy to anesthesia, nerve injury and recurrence were addressed. Prior to the procedure, the treatment site was clearly identified and confirmed by the patient. All components of Universal Protocol/PAUSE Rule completed. Consent (Scalp)/Introductory Paragraph: The rationale for Mohs was explained to the patient and consent was obtained. The risks, benefits and alternatives to therapy were discussed in detail. Specifically, the risks of changes in hair growth pattern secondary to repair, infection, scarring, bleeding, prolonged wound healing, incomplete removal, allergy to anesthesia, nerve injury and recurrence were addressed. Prior to the procedure, the treatment site was clearly identified and confirmed by the patient. All components of Universal Protocol/PAUSE Rule completed. Detail Level: Detailed Postop Diagnosis: same Surgeon: Ramon Camejo MD Anesthesia Volume In Cc: 3 Hemostasis: Electrocautery Estimated Blood Loss (Cc): minimal Anesthesia Volume In Cc: 6 Brow Lift Text: A midfrontal incision was made medially to the defect to allow access to the tissues just superior to the left eyebrow. Following careful dissection inferiorly in a supraperiosteal plane to the level of the left eyebrow, several 3-0 monocryl sutures were used to resuspend the eyebrow orbicularis oculi muscular unit to the superior frontal bone periosteum. This resulted in an appropriate reapproximation of static eyebrow symmetry and correction of the left brow ptosis. Epidermal Closure: none-secondary intention Suturegard Intro: Intraoperative tissue expansion was performed, utilizing the SUTUREGARD device, in order to reduce wound tension. Suturegard Body: The suture ends were repeatedly re-tightened and re-clamped to achieve the desired tissue expansion. Hemigard Intro: Due to skin fragility and wound tension, it was decided to use HEMIGARD adhesive retention suture devices to permit a linear closure. The skin was cleaned and dried for a 6cm distance away from the wound. Excessive hair, if present, was removed to allow for adhesion. Hemigard Postcare Instructions: The HEMIGARD strips are to remain completely dry for at least 5-7 days. Donor Site Anesthesia Type: same as repair anesthesia Epidermal Closure Graft Donor Site (Optional): simple interrupted Closure 2 Information: This tab is for additional flaps and grafts, including complex repair and grafts and complex repair and flaps. You can also specify a different location for the additional defect, if the location is the same you do not need to select a new one. We will insert the automated text for the repair you select below just as we do for solitary flaps and grafts. Please note that at this time if you select a location with a different insurance zone you will need to override the ICD10 and CPT if appropriate. Closure 3 Information: This tab is for additional flaps and grafts above and beyond our usual structured repairs.  Please note if you enter information here it will not currently bill and you will need to add the billing information manually. Closure 4 Information: This tab is for additional flaps and grafts above and beyond our usual structured repairs.  Please note if you enter information here it will not currently bill and you will need to add the billing information manually. Wound Care: Bacitracin Dressing: telfa dressing Wound Care (No Sutures): Petrolatum Dressing (No Sutures): dry sterile dressing Unna Boot Text: An Unna boot was placed to help immobilize the limb and facilitate more rapid healing. Home Suture Removal Text: Patient was provided instructions on removing sutures and will remove their sutures at home.  If they have any questions or difficulties they will call the office. Post-Care Instructions: I reviewed with the patient in detail post-care instructions. Pain Refusal Text: I offered to prescribe pain medication but the patient refused to take this medication. Mauc Instructions: By selecting yes to the question below the MAUC number will be added into the note.  This will be calculated automatically based on the diagnosis chosen, the size entered, the body zone selected (H,M,L) and the specific indications you chose. You will also have the option to override the Mohs AUC if you disagree with the automatically calculated number and this option is found in the Case Summary tab. Where Do You Want The Question To Include Opioid Counseling Located?: Case Summary Tab Eye Protection Verbiage: Before proceeding with the stage, a plastic scleral shield was inserted. The globe was anesthetized with a few drops of 1% lidocaine with 1:100,000 epinephrine. Then, an appropriate sized scleral shield was chosen and coated with lacrilube ointment. The shield was gently inserted and left in place for the duration of each stage. After the stage was completed, the shield was gently removed. Mohs Method Verbiage: An incision at a 90 degree angle following the standard Mohs approach was done and the specimen was harvested as a microscopic controlled layer. Surgeon/Pathologist Verbiage (Will Incorporate Name Of Surgeon From Intro If Not Blank): operated in two distinct and integrated capacities as the surgeon and pathologist. Mohs Histo Method Verbiage: Each section was then chromacoded and processed in the Mohs lab using the Mohs protocol and submitted for frozen section. Subsequent Stages Histo Method Verbiage: Using a similar technique to that described above, a thin layer of tissue was removed from all areas where tumor was visible on the previous stage.  The tissue was again oriented, mapped, dyed, and processed as above. Mohs Rapid Report Verbiage: The area of clinically evident tumor was marked with skin marking ink and appropriately hatched.  The initial incision was made following the Mohs approach through the skin.  The specimen was taken to the lab, divided into the necessary number of pieces, chromacoded and processed according to the Mohs protocol.  This was repeated in successive stages until a tumor free defect was achieved. Complex Repair Preamble Text (Leave Blank If You Do Not Want): Extensive wide undermining was performed. Intermediate Repair Preamble Text (Leave Blank If You Do Not Want): Undermining was performed with blunt dissection. Non-Graft Cartilage Fenestration Text: The cartilage was fenestrated with a 2mm punch biopsy to help facilitate healing. Graft Cartilage Fenestration Text: The cartilage was fenestrated with a 2mm punch biopsy to help facilitate graft survival and healing. Secondary Intention Text (Leave Blank If You Do Not Want): The defect will heal with secondary intention. No Repair - Repaired With Adjacent Surgical Defect Text (Leave Blank If You Do Not Want): After obtaining clear surgical margins the defect was repaired concurrently with another surgical defect which was in close approximation. Advancement Flap (Single) Text: The defect edges were debeveled with a #15 scalpel blade.  Given the location of the defect and the proximity to free margins a single advancement flap was deemed most appropriate.  Using a sterile surgical marker, an appropriate advancement flap was drawn incorporating the defect and placing the expected incisions within the relaxed skin tension lines where possible.    The area thus outlined was incised deep to adipose tissue with a #15 scalpel blade.  The skin margins were undermined to an appropriate distance in all directions utilizing iris scissors. Advancement Flap (Double) Text: The defect edges were debeveled with a #15 scalpel blade.  Given the location of the defect and the proximity to free margins a double advancement flap was deemed most appropriate.  Using a sterile surgical marker, the appropriate advancement flaps were drawn incorporating the defect and placing the expected incisions within the relaxed skin tension lines where possible.    The area thus outlined was incised deep to adipose tissue with a #15 scalpel blade.  The skin margins were undermined to an appropriate distance in all directions utilizing iris scissors. Burow's Advancement Flap Text: The defect edges were debeveled with a #15 scalpel blade.  Given the location of the defect and the proximity to free margins a Burow's advancement flap was deemed most appropriate.  Using a sterile surgical marker, the appropriate advancement flap was drawn incorporating the defect and placing the expected incisions within the relaxed skin tension lines where possible.    The area thus outlined was incised deep to adipose tissue with a #15 scalpel blade.  The skin margins were undermined to an appropriate distance in all directions utilizing iris scissors. Chonodrocutaneous Helical Advancement Flap Text: The defect edges were debeveled with a #15 scalpel blade.  Given the location of the defect and the proximity to free margins a chondrocutaneous helical advancement flap was deemed most appropriate.  Using a sterile surgical marker, the appropriate advancement flap was drawn incorporating the defect and placing the expected incisions within the relaxed skin tension lines where possible.    The area thus outlined was incised deep to adipose tissue with a #15 scalpel blade.  The skin margins were undermined to an appropriate distance in all directions utilizing iris scissors. Crescentic Advancement Flap Text: The defect edges were debeveled with a #15 scalpel blade.  Given the location of the defect and the proximity to free margins a crescentic advancement flap was deemed most appropriate.  Using a sterile surgical marker, the appropriate advancement flap was drawn incorporating the defect and placing the expected incisions within the relaxed skin tension lines where possible.    The area thus outlined was incised deep to adipose tissue with a #15 scalpel blade.  The skin margins were undermined to an appropriate distance in all directions utilizing iris scissors. A-T Advancement Flap Text: The defect edges were debeveled with a #15 scalpel blade.  Given the location of the defect, shape of the defect and the proximity to free margins an A-T advancement flap was deemed most appropriate.  Using a sterile surgical marker, an appropriate advancement flap was drawn incorporating the defect and placing the expected incisions within the relaxed skin tension lines where possible.    The area thus outlined was incised deep to adipose tissue with a #15 scalpel blade.  The skin margins were undermined to an appropriate distance in all directions utilizing iris scissors. O-T Advancement Flap Text: The defect edges were debeveled with a #15 scalpel blade.  Given the location of the defect, shape of the defect and the proximity to free margins an O-T advancement flap was deemed most appropriate.  Using a sterile surgical marker, an appropriate advancement flap was drawn incorporating the defect and placing the expected incisions within the relaxed skin tension lines where possible.    The area thus outlined was incised deep to adipose tissue with a #15 scalpel blade.  The skin margins were undermined to an appropriate distance in all directions utilizing iris scissors. O-L Flap Text: The defect edges were debeveled with a #15 scalpel blade.  Given the location of the defect, shape of the defect and the proximity to free margins an O-L flap was deemed most appropriate.  Using a sterile surgical marker, an appropriate advancement flap was drawn incorporating the defect and placing the expected incisions within the relaxed skin tension lines where possible.    The area thus outlined was incised deep to adipose tissue with a #15 scalpel blade.  The skin margins were undermined to an appropriate distance in all directions utilizing iris scissors. O-Z Flap Text: The defect edges were debeveled with a #15 scalpel blade.  Given the location of the defect, shape of the defect and the proximity to free margins an O-Z flap was deemed most appropriate.  Using a sterile surgical marker, an appropriate transposition flap was drawn incorporating the defect and placing the expected incisions within the relaxed skin tension lines where possible. The area thus outlined was incised deep to adipose tissue with a #15 scalpel blade.  The skin margins were undermined to an appropriate distance in all directions utilizing iris scissors. Double O-Z Flap Text: The defect edges were debeveled with a #15 scalpel blade.  Given the location of the defect, shape of the defect and the proximity to free margins a Double O-Z flap was deemed most appropriate.  Using a sterile surgical marker, an appropriate transposition flap was drawn incorporating the defect and placing the expected incisions within the relaxed skin tension lines where possible. The area thus outlined was incised deep to adipose tissue with a #15 scalpel blade.  The skin margins were undermined to an appropriate distance in all directions utilizing iris scissors. V-Y Flap Text: The defect edges were debeveled with a #15 scalpel blade.  Given the location of the defect, shape of the defect and the proximity to free margins a V-Y flap was deemed most appropriate.  Using a sterile surgical marker, an appropriate advancement flap was drawn incorporating the defect and placing the expected incisions within the relaxed skin tension lines where possible.    The area thus outlined was incised deep to adipose tissue with a #15 scalpel blade.  The skin margins were undermined to an appropriate distance in all directions utilizing iris scissors. Advancement-Rotation Flap Text: The defect edges were debeveled with a #15 scalpel blade.  Given the location of the defect, shape of the defect and the proximity to free margins an advancement-rotation flap was deemed most appropriate.  Using a sterile surgical marker, an appropriate flap was drawn incorporating the defect and placing the expected incisions within the relaxed skin tension lines where possible. The area thus outlined was incised deep to adipose tissue with a #15 scalpel blade.  The skin margins were undermined to an appropriate distance in all directions utilizing iris scissors. Mercedes Flap Text: The defect edges were debeveled with a #15 scalpel blade.  Given the location of the defect, shape of the defect and the proximity to free margins a Mercedes flap was deemed most appropriate.  Using a sterile surgical marker, an appropriate advancement flap was drawn incorporating the defect and placing the expected incisions within the relaxed skin tension lines where possible. The area thus outlined was incised deep to adipose tissue with a #15 scalpel blade.  The skin margins were undermined to an appropriate distance in all directions utilizing iris scissors. Modified Advancement Flap Text: The defect edges were debeveled with a #15 scalpel blade.  Given the location of the defect, shape of the defect and the proximity to free margins a modified advancement flap was deemed most appropriate.  Using a sterile surgical marker, an appropriate advancement flap was drawn incorporating the defect and placing the expected incisions within the relaxed skin tension lines where possible.    The area thus outlined was incised deep to adipose tissue with a #15 scalpel blade.  The skin margins were undermined to an appropriate distance in all directions utilizing iris scissors. Mucosal Advancement Flap Text: Given the location of the defect, shape of the defect and the proximity to free margins a mucosal advancement flap was deemed most appropriate. Incisions were made with a 15 blade scalpel in the appropriate fashion along the cutaneous vermilion border and the mucosal lip. The remaining actinically damaged mucosal tissue was excised.  The mucosal advancement flap was then elevated to the gingival sulcus with care taken to preserve the neurovascular structures and advanced into the primary defect. Care was taken to ensure that precise realignment of the vermilion border was achieved. Peng Advancement Flap Text: The defect edges were debeveled with a #15 scalpel blade.  Given the location of the defect, shape of the defect and the proximity to free margins a Peng advancement flap was deemed most appropriate.  Using a sterile surgical marker, an appropriate advancement flap was drawn incorporating the defect and placing the expected incisions within the relaxed skin tension lines where possible. The area thus outlined was incised deep to adipose tissue with a #15 scalpel blade.  The skin margins were undermined to an appropriate distance in all directions utilizing iris scissors. Hatchet Flap Text: The defect edges were debeveled with a #15 scalpel blade.  Given the location of the defect, shape of the defect and the proximity to free margins a hatchet flap was deemed most appropriate.  Using a sterile surgical marker, an appropriate hatchet flap was drawn incorporating the defect and placing the expected incisions within the relaxed skin tension lines where possible.    The area thus outlined was incised deep to adipose tissue with a #15 scalpel blade.  The skin margins were undermined to an appropriate distance in all directions utilizing iris scissors. Rotation Flap Text: The defect edges were debeveled with a #15 scalpel blade.  Given the location of the defect, shape of the defect and the proximity to free margins a rotation flap was deemed most appropriate.  Using a sterile surgical marker, an appropriate rotation flap was drawn incorporating the defect and placing the expected incisions within the relaxed skin tension lines where possible.    The area thus outlined was incised deep to adipose tissue with a #15 scalpel blade.  The skin margins were undermined to an appropriate distance in all directions utilizing iris scissors. Spiral Flap Text: The defect edges were debeveled with a #15 scalpel blade.  Given the location of the defect, shape of the defect and the proximity to free margins a spiral flap was deemed most appropriate.  Using a sterile surgical marker, an appropriate rotation flap was drawn incorporating the defect and placing the expected incisions within the relaxed skin tension lines where possible. The area thus outlined was incised deep to adipose tissue with a #15 scalpel blade.  The skin margins were undermined to an appropriate distance in all directions utilizing iris scissors. Staged Advancement Flap Text: The defect edges were debeveled with a #15 scalpel blade.  Given the location of the defect, shape of the defect and the proximity to free margins a staged advancement flap was deemed most appropriate.  Using a sterile surgical marker, an appropriate advancement flap was drawn incorporating the defect and placing the expected incisions within the relaxed skin tension lines where possible. The area thus outlined was incised deep to adipose tissue with a #15 scalpel blade.  The skin margins were undermined to an appropriate distance in all directions utilizing iris scissors. Star Wedge Flap Text: The defect edges were debeveled with a #15 scalpel blade.  Given the location of the defect, shape of the defect and the proximity to free margins a star wedge flap was deemed most appropriate.  Using a sterile surgical marker, an appropriate rotation flap was drawn incorporating the defect and placing the expected incisions within the relaxed skin tension lines where possible. The area thus outlined was incised deep to adipose tissue with a #15 scalpel blade.  The skin margins were undermined to an appropriate distance in all directions utilizing iris scissors. Transposition Flap Text: The defect edges were debeveled with a #15 scalpel blade.  Given the location of the defect and the proximity to free margins a transposition flap was deemed most appropriate.  Using a sterile surgical marker, an appropriate transposition flap was drawn incorporating the defect.    The area thus outlined was incised deep to adipose tissue with a #15 scalpel blade.  The skin margins were undermined to an appropriate distance in all directions utilizing iris scissors. Muscle Hinge Flap Text: The defect edges were debeveled with a #15 scalpel blade.  Given the size, depth and location of the defect and the proximity to free margins a muscle hinge flap was deemed most appropriate.  Using a sterile surgical marker, an appropriate hinge flap was drawn incorporating the defect. The area thus outlined was incised with a #15 scalpel blade.  The skin margins were undermined to an appropriate distance in all directions utilizing iris scissors. Nasal Turnover Hinge Flap Text: The defect edges were debeveled with a #15 scalpel blade.  Given the size, depth, location of the defect and the defect being full thickness a nasal turnover hinge flap was deemed most appropriate.  Using a sterile surgical marker, an appropriate hinge flap was drawn incorporating the defect. The area thus outlined was incised with a #15 scalpel blade. The flap was designed to recreate the nasal mucosal lining and the alar rim. The skin margins were undermined to an appropriate distance in all directions utilizing iris scissors. Nasalis-Muscle-Based Myocutaneous Island Pedicle Flap Text: Using a #15 blade, an incision was made around the donor flap to the level of the nasalis muscle. Wide lateral undermining was then performed in both the subcutaneous plane above the nasalis muscle, and in a submuscular plane just above periosteum. This allowed the formation of a free nasalis muscle axial pedicle (based on the angular artery) which was still attached to the actual cutaneous flap, increasing its mobility and vascular viability. Hemostasis was obtained with pinpoint electrocoagulation. The flap was mobilized into position and the pivotal anchor points positioned and stabilized with buried interrupted sutures. Subcutaneous and dermal tissues were closed in a multilayered fashion with sutures. Tissue redundancies were excised, and the epidermal edges were apposed without significant tension and sutured with sutures. Orbicularis Oris Muscle Flap Text: The defect edges were debeveled with a #15 scalpel blade.  Given that the defect affected the competency of the oral sphincter an orbicularis oris muscle flap was deemed most appropriate to restore this competency and normal muscle function.  Using a sterile surgical marker, an appropriate flap was drawn incorporating the defect. The area thus outlined was incised with a #15 scalpel blade. Melolabial Transposition Flap Text: The defect edges were debeveled with a #15 scalpel blade.  Given the location of the defect and the proximity to free margins a melolabial flap was deemed most appropriate.  Using a sterile surgical marker, an appropriate melolabial transposition flap was drawn incorporating the defect.    The area thus outlined was incised deep to adipose tissue with a #15 scalpel blade.  The skin margins were undermined to an appropriate distance in all directions utilizing iris scissors. Rhombic Flap Text: The defect edges were debeveled with a #15 scalpel blade.  Given the location of the defect and the proximity to free margins a rhombic flap was deemed most appropriate.  Using a sterile surgical marker, an appropriate rhombic flap was drawn incorporating the defect.    The area thus outlined was incised deep to adipose tissue with a #15 scalpel blade.  The skin margins were undermined to an appropriate distance in all directions utilizing iris scissors. Rhomboid Transposition Flap Text: The defect edges were debeveled with a #15 scalpel blade.  Given the location of the defect and the proximity to free margins a rhomboid transposition flap was deemed most appropriate.  Using a sterile surgical marker, an appropriate rhomboid flap was drawn incorporating the defect.    The area thus outlined was incised deep to adipose tissue with a #15 scalpel blade.  The skin margins were undermined to an appropriate distance in all directions utilizing iris scissors. Bi-Rhombic Flap Text: The defect edges were debeveled with a #15 scalpel blade.  Given the location of the defect and the proximity to free margins a bi-rhombic flap was deemed most appropriate.  Using a sterile surgical marker, an appropriate rhombic flap was drawn incorporating the defect. The area thus outlined was incised deep to adipose tissue with a #15 scalpel blade.  The skin margins were undermined to an appropriate distance in all directions utilizing iris scissors. Helical Rim Advancement Flap Text: The defect edges were debeveled with a #15 blade scalpel.  Given the location of the defect and the proximity to free margins (helical rim) a double helical rim advancement flap was deemed most appropriate.  Using a sterile surgical marker, the appropriate advancement flaps were drawn incorporating the defect and placing the expected incisions between the helical rim and antihelix where possible.  The area thus outlined was incised through and through with a #15 scalpel blade.  With a skin hook and iris scissors, the flaps were gently and sharply undermined and freed up. Bilateral Helical Rim Advancement Flap Text: The defect edges were debeveled with a #15 blade scalpel.  Given the location of the defect and the proximity to free margins (helical rim) a bilateral helical rim advancement flap was deemed most appropriate.  Using a sterile surgical marker, the appropriate advancement flaps were drawn incorporating the defect and placing the expected incisions between the helical rim and antihelix where possible.  The area thus outlined was incised through and through with a #15 scalpel blade.  With a skin hook and iris scissors, the flaps were gently and sharply undermined and freed up. Ear Star Wedge Flap Text: The defect edges were debeveled with a #15 blade scalpel.  Given the location of the defect and the proximity to free margins (helical rim) an ear star wedge flap was deemed most appropriate.  Using a sterile surgical marker, the appropriate flap was drawn incorporating the defect and placing the expected incisions between the helical rim and antihelix where possible.  The area thus outlined was incised through and through with a #15 scalpel blade. Banner Transposition Flap Text: The defect edges were debeveled with a #15 scalpel blade.  Given the location of the defect and the proximity to free margins a Banner transposition flap was deemed most appropriate.  Using a sterile surgical marker, an appropriate flap drawn around the defect. The area thus outlined was incised deep to adipose tissue with a #15 scalpel blade.  The skin margins were undermined to an appropriate distance in all directions utilizing iris scissors. Bilobed Flap Text: The defect edges were debeveled with a #15 scalpel blade.  Given the location of the defect and the proximity to free margins a bilobe flap was deemed most appropriate.  Using a sterile surgical marker, an appropriate bilobe flap drawn around the defect.    The area thus outlined was incised deep to adipose tissue with a #15 scalpel blade.  The skin margins were undermined to an appropriate distance in all directions utilizing iris scissors. Bilobed Transposition Flap Text: The defect edges were debeveled with a #15 scalpel blade.  Given the location of the defect and the proximity to free margins a bilobed transposition flap was deemed most appropriate.  Using a sterile surgical marker, an appropriate bilobe flap drawn around the defect.    The area thus outlined was incised deep to adipose tissue with a #15 scalpel blade.  The skin margins were undermined to an appropriate distance in all directions utilizing iris scissors. Trilobed Flap Text: The defect edges were debeveled with a #15 scalpel blade.  Given the location of the defect and the proximity to free margins a trilobed flap was deemed most appropriate.  Using a sterile surgical marker, an appropriate trilobed flap drawn around the defect.    The area thus outlined was incised deep to adipose tissue with a #15 scalpel blade.  The skin margins were undermined to an appropriate distance in all directions utilizing iris scissors. Dorsal Nasal Flap Text: The defect edges were debeveled with a #15 scalpel blade.  Given the location of the defect and the proximity to free margins a dorsal nasal flap was deemed most appropriate.  Using a sterile surgical marker, an appropriate dorsal nasal flap was drawn around the defect.    The area thus outlined was incised deep to adipose tissue with a #15 scalpel blade.  The skin margins were undermined to an appropriate distance in all directions utilizing iris scissors. Island Pedicle Flap Text: The defect edges were debeveled with a #15 scalpel blade.  Given the location of the defect, shape of the defect and the proximity to free margins an island pedicle advancement flap was deemed most appropriate.  Using a sterile surgical marker, an appropriate advancement flap was drawn incorporating the defect, outlining the appropriate donor tissue and placing the expected incisions within the relaxed skin tension lines where possible.    The area thus outlined was incised deep to adipose tissue with a #15 scalpel blade.  The skin margins were undermined to an appropriate distance in all directions around the primary defect and laterally outward around the island pedicle utilizing iris scissors.  There was minimal undermining beneath the pedicle flap. Island Pedicle Flap With Canthal Suspension Text: The defect edges were debeveled with a #15 scalpel blade.  Given the location of the defect, shape of the defect and the proximity to free margins an island pedicle advancement flap was deemed most appropriate.  Using a sterile surgical marker, an appropriate advancement flap was drawn incorporating the defect, outlining the appropriate donor tissue and placing the expected incisions within the relaxed skin tension lines where possible. The area thus outlined was incised deep to adipose tissue with a #15 scalpel blade.  The skin margins were undermined to an appropriate distance in all directions around the primary defect and laterally outward around the island pedicle utilizing iris scissors.  There was minimal undermining beneath the pedicle flap. A suspension suture was placed in the canthal tendon to prevent tension and prevent ectropion. Alar Island Pedicle Flap Text: The defect edges were debeveled with a #15 scalpel blade.  Given the location of the defect, shape of the defect and the proximity to the alar rim an island pedicle advancement flap was deemed most appropriate.  Using a sterile surgical marker, an appropriate advancement flap was drawn incorporating the defect, outlining the appropriate donor tissue and placing the expected incisions within the nasal ala running parallel to the alar rim. The area thus outlined was incised with a #15 scalpel blade.  The skin margins were undermined minimally to an appropriate distance in all directions around the primary defect and laterally outward around the island pedicle utilizing iris scissors.  There was minimal undermining beneath the pedicle flap. Double Island Pedicle Flap Text: The defect edges were debeveled with a #15 scalpel blade.  Given the location of the defect, shape of the defect and the proximity to free margins a double island pedicle advancement flap was deemed most appropriate.  Using a sterile surgical marker, an appropriate advancement flap was drawn incorporating the defect, outlining the appropriate donor tissue and placing the expected incisions within the relaxed skin tension lines where possible.    The area thus outlined was incised deep to adipose tissue with a #15 scalpel blade.  The skin margins were undermined to an appropriate distance in all directions around the primary defect and laterally outward around the island pedicle utilizing iris scissors.  There was minimal undermining beneath the pedicle flap. Island Pedicle Flap-Requiring Vessel Identification Text: The defect edges were debeveled with a #15 scalpel blade.  Given the location of the defect, shape of the defect and the proximity to free margins an island pedicle advancement flap was deemed most appropriate.  Using a sterile surgical marker, an appropriate advancement flap was drawn, based on the axial vessel mentioned above, incorporating the defect, outlining the appropriate donor tissue and placing the expected incisions within the relaxed skin tension lines where possible.    The area thus outlined was incised deep to adipose tissue with a #15 scalpel blade.  The skin margins were undermined to an appropriate distance in all directions around the primary defect and laterally outward around the island pedicle utilizing iris scissors.  There was minimal undermining beneath the pedicle flap. Keystone Flap Text: The defect edges were debeveled with a #15 scalpel blade.  Given the location of the defect, shape of the defect a keystone flap was deemed most appropriate.  Using a sterile surgical marker, an appropriate keystone flap was drawn incorporating the defect, outlining the appropriate donor tissue and placing the expected incisions within the relaxed skin tension lines where possible. The area thus outlined was incised deep to adipose tissue with a #15 scalpel blade.  The skin margins were undermined to an appropriate distance in all directions around the primary defect and laterally outward around the flap utilizing iris scissors. O-T Plasty Text: The defect edges were debeveled with a #15 scalpel blade.  Given the location of the defect, shape of the defect and the proximity to free margins an O-T plasty was deemed most appropriate.  Using a sterile surgical marker, an appropriate O-T plasty was drawn incorporating the defect and placing the expected incisions within the relaxed skin tension lines where possible.    The area thus outlined was incised deep to adipose tissue with a #15 scalpel blade.  The skin margins were undermined to an appropriate distance in all directions utilizing iris scissors. O-Z Plasty Text: The defect edges were debeveled with a #15 scalpel blade.  Given the location of the defect, shape of the defect and the proximity to free margins an O-Z plasty (double transposition flap) was deemed most appropriate.  Using a sterile surgical marker, the appropriate transposition flaps were drawn incorporating the defect and placing the expected incisions within the relaxed skin tension lines where possible.    The area thus outlined was incised deep to adipose tissue with a #15 scalpel blade.  The skin margins were undermined to an appropriate distance in all directions utilizing iris scissors.  Hemostasis was achieved with electrocautery.  The flaps were then transposed into place, one clockwise and the other counterclockwise, and anchored with interrupted buried subcutaneous sutures. Double O-Z Plasty Text: The defect edges were debeveled with a #15 scalpel blade.  Given the location of the defect, shape of the defect and the proximity to free margins a Double O-Z plasty (double transposition flap) was deemed most appropriate.  Using a sterile surgical marker, the appropriate transposition flaps were drawn incorporating the defect and placing the expected incisions within the relaxed skin tension lines where possible. The area thus outlined was incised deep to adipose tissue with a #15 scalpel blade.  The skin margins were undermined to an appropriate distance in all directions utilizing iris scissors.  Hemostasis was achieved with electrocautery.  The flaps were then transposed into place, one clockwise and the other counterclockwise, and anchored with interrupted buried subcutaneous sutures. V-Y Plasty Text: The defect edges were debeveled with a #15 scalpel blade.  Given the location of the defect, shape of the defect and the proximity to free margins an V-Y advancement flap was deemed most appropriate.  Using a sterile surgical marker, an appropriate advancement flap was drawn incorporating the defect and placing the expected incisions within the relaxed skin tension lines where possible.    The area thus outlined was incised deep to adipose tissue with a #15 scalpel blade.  The skin margins were undermined to an appropriate distance in all directions utilizing iris scissors. H Plasty Text: Given the location of the defect, shape of the defect and the proximity to free margins a H-plasty was deemed most appropriate for repair.  Using a sterile surgical marker, the appropriate advancement arms of the H-plasty were drawn incorporating the defect and placing the expected incisions within the relaxed skin tension lines where possible. The area thus outlined was incised deep to adipose tissue with a #15 scalpel blade. The skin margins were undermined to an appropriate distance in all directions utilizing iris scissors.  The opposing advancement arms were then advanced into place in opposite direction and anchored with interrupted buried subcutaneous sutures. W Plasty Text: The lesion was extirpated to the level of the fat with a #15 scalpel blade.  Given the location of the defect, shape of the defect and the proximity to free margins a W-plasty was deemed most appropriate for repair.  Using a sterile surgical marker, the appropriate transposition arms of the W-plasty were drawn incorporating the defect and placing the expected incisions within the relaxed skin tension lines where possible.    The area thus outlined was incised deep to adipose tissue with a #15 scalpel blade.  The skin margins were undermined to an appropriate distance in all directions utilizing iris scissors.  The opposing transposition arms were then transposed into place in opposite direction and anchored with interrupted buried subcutaneous sutures. Z Plasty Text: The lesion was extirpated to the level of the fat with a #15 scalpel blade.  Given the location of the defect, shape of the defect and the proximity to free margins a Z-plasty was deemed most appropriate for repair.  Using a sterile surgical marker, the appropriate transposition arms of the Z-plasty were drawn incorporating the defect and placing the expected incisions within the relaxed skin tension lines where possible.    The area thus outlined was incised deep to adipose tissue with a #15 scalpel blade.  The skin margins were undermined to an appropriate distance in all directions utilizing iris scissors.  The opposing transposition arms were then transposed into place in opposite direction and anchored with interrupted buried subcutaneous sutures. Zygomaticofacial Flap Text: Given the location of the defect, shape of the defect and the proximity to free margins a zygomaticofacial flap was deemed most appropriate for repair.  Using a sterile surgical marker, the appropriate flap was drawn incorporating the defect and placing the expected incisions within the relaxed skin tension lines where possible. The area thus outlined was incised deep to adipose tissue with a #15 scalpel blade with preservation of a vascular pedicle.  The skin margins were undermined to an appropriate distance in all directions utilizing iris scissors.  The flap was then placed into the defect and anchored with interrupted buried subcutaneous sutures. Cheek Interpolation Flap Text: A decision was made to reconstruct the defect utilizing an interpolation axial flap and a staged reconstruction.  A telfa template was made of the defect.  This telfa template was then used to outline the Cheek Interpolation flap.  The donor area for the pedicle flap was then injected with anesthesia.  The flap was excised through the skin and subcutaneous tissue down to the layer of the underlying musculature.  The interpolation flap was carefully excised within this deep plane to maintain its blood supply.  The edges of the donor site were undermined.   The donor site was closed in a primary fashion.  The pedicle was then rotated into position and sutured.  Once the tube was sutured into place, adequate blood supply was confirmed with blanching and refill.  The pedicle was then wrapped with xeroform gauze and dressed appropriately with a telfa and gauze bandage to ensure continued blood supply and protect the attached pedicle. Cheek-To-Nose Interpolation Flap Text: A decision was made to reconstruct the defect utilizing an interpolation axial flap and a staged reconstruction.  A telfa template was made of the defect.  This telfa template was then used to outline the Cheek-To-Nose Interpolation flap.  The donor area for the pedicle flap was then injected with anesthesia.  The flap was excised through the skin and subcutaneous tissue down to the layer of the underlying musculature.  The interpolation flap was carefully excised within this deep plane to maintain its blood supply.  The edges of the donor site were undermined.   The donor site was closed in a primary fashion.  The pedicle was then rotated into position and sutured.  Once the tube was sutured into place, adequate blood supply was confirmed with blanching and refill.  The pedicle was then wrapped with xeroform gauze and dressed appropriately with a telfa and gauze bandage to ensure continued blood supply and protect the attached pedicle. Interpolation Flap Text: A decision was made to reconstruct the defect utilizing an interpolation axial flap and a staged reconstruction.  A telfa template was made of the defect.  This telfa template was then used to outline the interpolation flap.  The donor area for the pedicle flap was then injected with anesthesia.  The flap was excised through the skin and subcutaneous tissue down to the layer of the underlying musculature.  The interpolation flap was carefully excised within this deep plane to maintain its blood supply.  The edges of the donor site were undermined.   The donor site was closed in a primary fashion.  The pedicle was then rotated into position and sutured.  Once the tube was sutured into place, adequate blood supply was confirmed with blanching and refill.  The pedicle was then wrapped with xeroform gauze and dressed appropriately with a telfa and gauze bandage to ensure continued blood supply and protect the attached pedicle. Melolabial Interpolation Flap Text: A decision was made to reconstruct the defect utilizing an interpolation axial flap and a staged reconstruction.  A telfa template was made of the defect.  This telfa template was then used to outline the melolabial interpolation flap.  The donor area for the pedicle flap was then injected with anesthesia.  The flap was excised through the skin and subcutaneous tissue down to the layer of the underlying musculature.  The pedicle flap was carefully excised within this deep plane to maintain its blood supply.  The edges of the donor site were undermined.   The donor site was closed in a primary fashion.  The pedicle was then rotated into position and sutured.  Once the tube was sutured into place, adequate blood supply was confirmed with blanching and refill.  The pedicle was then wrapped with xeroform gauze and dressed appropriately with a telfa and gauze bandage to ensure continued blood supply and protect the attached pedicle. Mastoid Interpolation Flap Text: A decision was made to reconstruct the defect utilizing an interpolation axial flap and a staged reconstruction.  A telfa template was made of the defect.  This telfa template was then used to outline the mastoid interpolation flap.  The donor area for the pedicle flap was then injected with anesthesia.  The flap was excised through the skin and subcutaneous tissue down to the layer of the underlying musculature.  The pedicle flap was carefully excised within this deep plane to maintain its blood supply.  The edges of the donor site were undermined.   The donor site was closed in a primary fashion.  The pedicle was then rotated into position and sutured.  Once the tube was sutured into place, adequate blood supply was confirmed with blanching and refill.  The pedicle was then wrapped with xeroform gauze and dressed appropriately with a telfa and gauze bandage to ensure continued blood supply and protect the attached pedicle. Posterior Auricular Interpolation Flap Text: A decision was made to reconstruct the defect utilizing an interpolation axial flap and a staged reconstruction.  A telfa template was made of the defect.  This telfa template was then used to outline the posterior auricular interpolation flap.  The donor area for the pedicle flap was then injected with anesthesia.  The flap was excised through the skin and subcutaneous tissue down to the layer of the underlying musculature.  The pedicle flap was carefully excised within this deep plane to maintain its blood supply.  The edges of the donor site were undermined.   The donor site was closed in a primary fashion.  The pedicle was then rotated into position and sutured.  Once the tube was sutured into place, adequate blood supply was confirmed with blanching and refill.  The pedicle was then wrapped with xeroform gauze and dressed appropriately with a telfa and gauze bandage to ensure continued blood supply and protect the attached pedicle. Paramedian Forehead Flap Text: A decision was made to reconstruct the defect utilizing an interpolation axial flap and a staged reconstruction.  A telfa template was made of the defect.  This telfa template was then used to outline the paramedian forehead pedicle flap.  The donor area for the pedicle flap was then injected with anesthesia.  The flap was excised through the skin and subcutaneous tissue down to the layer of the underlying musculature.  The pedicle flap was carefully excised within this deep plane to maintain its blood supply.  The edges of the donor site were undermined.   The donor site was closed in a primary fashion.  The pedicle was then rotated into position and sutured.  Once the tube was sutured into place, adequate blood supply was confirmed with blanching and refill.  The pedicle was then wrapped with xeroform gauze and dressed appropriately with a telfa and gauze bandage to ensure continued blood supply and protect the attached pedicle. Cheiloplasty (Less Than 50%) Text: A decision was made to reconstruct the defect with a  cheiloplasty.  The defect was undermined extensively.  Additional obicularis oris muscle was excised with a 15 blade scalpel.  The defect was converted into a full thickness wedge, of less than 50% of the vertical height of the lip, to facilite a better cosmetic result.  Small vessels were then tied off with 5-0 monocyrl. The obicularis oris, superficial fascia, adipose and dermis were then reapproximated.  After the deeper layers were approximated the epidermis was reapproximated with particular care given to realign the vermilion border. Cheiloplasty (Complex) Text: A decision was made to reconstruct the defect with a  cheiloplasty.  The defect was undermined extensively.  Additional obicularis oris muscle was excised with a 15 blade scalpel.  The defect was converted into a full thickness wedge to facilite a better cosmetic result.  Small vessels were then tied off with 5-0 monocyrl. The obicularis oris, superficial fascia, adipose and dermis were then reapproximated.  After the deeper layers were approximated the epidermis was reapproximated with particular care given to realign the vermilion border. Ear Wedge Repair Text: A wedge excision was completed by carrying down an excision through the full thickness of the ear and cartilage with an inward facing Burow's triangle. The wound was then closed in a layered fashion. Full Thickness Lip Wedge Repair (Flap) Text: Given the location of the defect and the proximity to free margins a full thickness wedge repair was deemed most appropriate.  Using a sterile surgical marker, the appropriate repair was drawn incorporating the defect and placing the expected incisions perpendicular to the vermilion border.  The vermilion border was also meticulously outlined to ensure appropriate reapproximation during the repair.  The area thus outlined was incised through and through with a #15 scalpel blade.  The muscularis and dermis were reaproximated with deep sutures following hemostasis. Care was taken to realign the vermilion border before proceeding with the superficial closure.  Once the vermilion was realigned the superfical and mucosal closure was finished. Ftsg Text: Given the location of the defect, shape of the defect and the proximity to free margins a full thickness skin graft was deemed most appropriate.  Using a sterile surgical marker, the primary defect shape was transferred to the donor site. The area was outlined and then incised deep to adipose tissue with a #15 scalpel blade.  The harvested graft was then trimmed of adipose tissue until only dermis and epidermis was left.   The secondary defect was closed with interrupted buried subcutaneous sutures.  The skin edges were then re-apposed with staples.  The skin graft was then placed in the primary defect and oriented appropriately. Split-Thickness Skin Graft Text: The defect edges were debeveled with a #15 scalpel blade.  Given the location of the defect, shape of the defect and the proximity to free margins a split thickness skin graft was deemed most appropriate.  Using a sterile surgical marker, the primary defect shape was transferred to the donor site. The split thickness graft was then harvested.  The skin graft was then placed in the primary defect and oriented appropriately. Burow's Graft Text: The defect edges were debeveled with a #15 scalpel blade.  Given the location of the defect, shape of the defect, the proximity to free margins and the presence of a standing cone deformity a Burow's skin graft was deemed most appropriate. The standing cone was removed and this tissue was then trimmed to the shape of the primary defect. The adipose tissue was also removed until only dermis and epidermis were left.  The skin margins of the secondary defect were undermined to an appropriate distance in all directions utilizing iris scissors.  The secondary defect was closed with interrupted buried subcutaneous sutures.  The skin edges were then re-apposed with running  sutures.  The skin graft was then placed in the primary defect and oriented appropriately. Cartilage Graft Text: The defect edges were debeveled with a #15 scalpel blade.  Given the location of the defect, shape of the defect, the fact the defect involved a full thickness cartilage defect a cartilage graft was deemed most appropriate.  An appropriate donor site was identified, cleansed, and anesthetized. The cartilage graft was then harvested and transferred to the recipient site, oriented appropriately and then sutured into place.  The secondary defect was then repaired using a primary closure. Composite Graft Text: The defect edges were debeveled with a #15 scalpel blade.  Given the location of the defect, shape of the defect, the proximity to free margins and the fact the defect was full thickness a composite graft was deemed most appropriate.  The defect was outline and then transferred to the donor site.  A full thickness graft was then excised from the donor site. The graft was then placed in the primary defect, oriented appropriately and then sutured into place.  The secondary defect was then repaired using a primary closure. Epidermal Autograft Text: The defect edges were debeveled with a #15 scalpel blade.  Given the location of the defect, shape of the defect and the proximity to free margins an epidermal autograft was deemed most appropriate.  Using a sterile surgical marker, the primary defect shape was transferred to the donor site. The epidermal graft was then harvested.  The skin graft was then placed in the primary defect and oriented appropriately. Dermal Autograft Text: The defect edges were debeveled with a #15 scalpel blade.  Given the location of the defect, shape of the defect and the proximity to free margins a dermal autograft was deemed most appropriate.  Using a sterile surgical marker, the primary defect shape was transferred to the donor site. The area thus outlined was incised deep to adipose tissue with a #15 scalpel blade.  The harvested graft was then trimmed of adipose and epidermal tissue until only dermis was left.  The skin graft was then placed in the primary defect and oriented appropriately. Skin Substitute Text: The defect edges were debeveled with a #15 scalpel blade.  Given the location of the defect, shape of the defect and the proximity to free margins a skin substitute graft was deemed most appropriate.  The graft material was trimmed to fit the size of the defect. The graft was then placed in the primary defect and oriented appropriately. Tissue Cultured Epidermal Autograft Text: The defect edges were debeveled with a #15 scalpel blade.  Given the location of the defect, shape of the defect and the proximity to free margins a tissue cultured epidermal autograft was deemed most appropriate.  The graft was then trimmed to fit the size of the defect.  The graft was then placed in the primary defect and oriented appropriately. Xenograft Text: The defect edges were debeveled with a #15 scalpel blade.  Given the location of the defect, shape of the defect and the proximity to free margins a xenograft was deemed most appropriate.  The graft was then trimmed to fit the size of the defect.  The graft was then placed in the primary defect and oriented appropriately. Purse String (Simple) Text: Given the location of the defect and the characteristics of the surrounding skin a purse string closure was deemed most appropriate.  Undermining was performed circumfirentially around the surgical defect.  A purse string suture was then placed and tightened. Purse String (Intermediate) Text: Given the location of the defect and the characteristics of the surrounding skin a purse string intermediate closure was deemed most appropriate.  Undermining was performed circumfirentially around the surgical defect.  A purse string suture was then placed and tightened. Partial Purse String (Simple) Text: Given the location of the defect and the characteristics of the surrounding skin a simple purse string closure was deemed most appropriate.  Undermining was performed circumfirentially around the surgical defect.  A purse string suture was then placed and tightened. Wound tension only allowed a partial closure of the circular defect. Partial Purse String (Intermediate) Text: Given the location of the defect and the characteristics of the surrounding skin an intermediate purse string closure was deemed most appropriate.  Undermining was performed circumfirentially around the surgical defect.  A purse string suture was then placed and tightened. Wound tension only allowed a partial closure of the circular defect. Localized Dermabrasion Text: The patient was draped in routine manner.  Localized dermabrasion using 3 x 17 mm wire brush was performed in routine manner to papillary dermis. This spot dermabrasion is being performed to complete skin cancer reconstruction. It also will eliminate the other sun damaged precancerous cells that are known to be part of the regional effect of a lifetime's worth of sun exposure. This localized dermabrasion is therapeutic and should not be considered cosmetic in any regard. Tarsorrhaphy Text: A tarsorrhaphy was performed using Frost sutures. Complex Repair And Flap Additional Text (Will Appearing After The Standard Complex Repair Text): The complex repair was not sufficient to completely close the primary defect. The remaining additional defect was repaired with the flap mentioned below. Complex Repair And Graft Additional Text (Will Appearing After The Standard Complex Repair Text): The complex repair was not sufficient to completely close the primary defect. The remaining additional defect was repaired with the graft mentioned below. Manual Repair Warning Statement: We plan on removing the manually selected variable below in favor of our much easier automatic structured text blocks found in the previous tab. We decided to do this to help make the flow better and give you the full power of structured data. Manual selection is never going to be ideal in our platform and I would encourage you to avoid using manual selection from this point on, especially since I will be sunsetting this feature. It is important that you do one of two things with the customized text below. First, you can save all of the text in a word file so you can have it for future reference. Second, transfer the text to the appropriate area in the Library tab. Lastly, if there is a flap or graft type which we do not have you need to let us know right away so I can add it in before the variable is hidden. No need to panic, we plan to give you roughly 6 months to make the change. Same Histology In Subsequent Stages Text: The pattern and morphology of the tumor is as described in the first stage. No Residual Tumor Seen Histology Text: There were no malignant cells seen in the sections examined. Inflammation Suggestive Of Cancer Camouflage Histology Text: There was a dense lymphocytic infiltrate which prevented adequate histologic evaluation of adjacent structures. Bcc Histology Text: There were numerous aggregates of basaloid cells. Bcc Infiltrative Histology Text: There were numerous aggregates of basaloid cells demonstrating an infiltrative pattern. Mart-1 - Positive Histology Text: MART-1 staining demonstrates areas of higher density and clustering of melanocytes with Pagetoid spread upwards within the epidermis. The surgical margins are positive for tumor cells. Mart-1 - Negative Histology Text: MART-1 staining demonstrates a normal density and pattern of melanocytes along the dermal-epidermal junction. The surgical margins are negative for tumor cells. [facility] Id #: [id number] Information: Selecting Yes will display possible errors in your note based on the variables you have selected. This validation is only offered as a suggestion for you. PLEASE NOTE THAT THE VALIDATION TEXT WILL BE REMOVED WHEN YOU FINALIZE YOUR NOTE. IF YOU WANT TO FAX A PRELIMINARY NOTE YOU WILL NEED TO TOGGLE THIS TO 'NO' IF YOU DO NOT WANT IT IN YOUR FAXED NOTE.

## 2021-07-29 ENCOUNTER — HOSPITAL ENCOUNTER (OUTPATIENT)
Facility: MEDICAL CENTER | Age: 71
End: 2021-07-29
Attending: INTERNAL MEDICINE | Admitting: INTERNAL MEDICINE
Payer: MEDICARE

## 2021-07-29 ENCOUNTER — APPOINTMENT (OUTPATIENT)
Dept: RADIOLOGY | Facility: MEDICAL CENTER | Age: 71
End: 2021-07-29
Attending: INTERNAL MEDICINE
Payer: MEDICARE

## 2021-07-29 ENCOUNTER — APPOINTMENT (OUTPATIENT)
Dept: CARDIOLOGY | Facility: MEDICAL CENTER | Age: 71
End: 2021-07-29
Attending: INTERNAL MEDICINE
Payer: MEDICARE

## 2021-07-29 VITALS
RESPIRATION RATE: 19 BRPM | DIASTOLIC BLOOD PRESSURE: 68 MMHG | HEIGHT: 75 IN | HEART RATE: 70 BPM | OXYGEN SATURATION: 95 % | TEMPERATURE: 97.1 F | WEIGHT: 181.44 LBS | SYSTOLIC BLOOD PRESSURE: 123 MMHG | BODY MASS INDEX: 22.56 KG/M2

## 2021-07-29 DIAGNOSIS — I25.5 ISCHEMIC CARDIOMYOPATHY: ICD-10-CM

## 2021-07-29 LAB — GLUCOSE BLD-MCNC: 160 MG/DL (ref 65–99)

## 2021-07-29 PROCEDURE — 99152 MOD SED SAME PHYS/QHP 5/>YRS: CPT | Performed by: INTERNAL MEDICINE

## 2021-07-29 PROCEDURE — 160002 HCHG RECOVERY MINUTES (STAT)

## 2021-07-29 PROCEDURE — 71045 X-RAY EXAM CHEST 1 VIEW: CPT

## 2021-07-29 PROCEDURE — 93005 ELECTROCARDIOGRAM TRACING: CPT | Performed by: INTERNAL MEDICINE

## 2021-07-29 PROCEDURE — 33249 INSJ/RPLCMT DEFIB W/LEAD(S): CPT | Performed by: INTERNAL MEDICINE

## 2021-07-29 PROCEDURE — 82962 GLUCOSE BLOOD TEST: CPT

## 2021-07-29 PROCEDURE — 700111 HCHG RX REV CODE 636 W/ 250 OVERRIDE (IP)

## 2021-07-29 PROCEDURE — C1894 INTRO/SHEATH, NON-LASER: HCPCS

## 2021-07-29 PROCEDURE — 700101 HCHG RX REV CODE 250

## 2021-07-29 RX ORDER — MIDAZOLAM HYDROCHLORIDE 1 MG/ML
INJECTION INTRAMUSCULAR; INTRAVENOUS
Status: COMPLETED
Start: 2021-07-29 | End: 2021-07-29

## 2021-07-29 RX ORDER — CEFAZOLIN SODIUM 1 G/3ML
INJECTION, POWDER, FOR SOLUTION INTRAMUSCULAR; INTRAVENOUS
Status: COMPLETED
Start: 2021-07-29 | End: 2021-07-29

## 2021-07-29 RX ORDER — CEPHALEXIN 500 MG/1
500 CAPSULE ORAL 3 TIMES DAILY
Qty: 15 CAPSULE | Refills: 0 | Status: SHIPPED | OUTPATIENT
Start: 2021-07-29 | End: 2021-08-05

## 2021-07-29 RX ORDER — LIDOCAINE HYDROCHLORIDE 20 MG/ML
INJECTION, SOLUTION INFILTRATION; PERINEURAL
Status: COMPLETED
Start: 2021-07-29 | End: 2021-07-29

## 2021-07-29 RX ORDER — COLESEVELAM 180 1/1
625 TABLET ORAL
COMMUNITY
End: 2021-08-25

## 2021-07-29 RX ORDER — BUPIVACAINE HYDROCHLORIDE 2.5 MG/ML
INJECTION, SOLUTION EPIDURAL; INFILTRATION; INTRACAUDAL
Status: COMPLETED
Start: 2021-07-29 | End: 2021-07-29

## 2021-07-29 RX ADMIN — MIDAZOLAM HYDROCHLORIDE 2 MG: 1 INJECTION, SOLUTION INTRAMUSCULAR; INTRAVENOUS at 15:19

## 2021-07-29 RX ADMIN — FENTANYL CITRATE 100 MCG: 50 INJECTION, SOLUTION INTRAMUSCULAR; INTRAVENOUS at 15:31

## 2021-07-29 RX ADMIN — FENTANYL CITRATE 100 MCG: 50 INJECTION, SOLUTION INTRAMUSCULAR; INTRAVENOUS at 15:19

## 2021-07-29 RX ADMIN — LIDOCAINE HYDROCHLORIDE: 20 INJECTION, SOLUTION INFILTRATION; PERINEURAL at 15:17

## 2021-07-29 RX ADMIN — MIDAZOLAM HYDROCHLORIDE 1 MG: 1 INJECTION, SOLUTION INTRAMUSCULAR; INTRAVENOUS at 15:40

## 2021-07-29 RX ADMIN — CEFAZOLIN 3000 MG: 330 INJECTION, POWDER, FOR SOLUTION INTRAMUSCULAR; INTRAVENOUS at 15:17

## 2021-07-29 RX ADMIN — BUPIVACAINE HYDROCHLORIDE: 2.5 INJECTION, SOLUTION EPIDURAL; INFILTRATION; INTRACAUDAL; PERINEURAL at 15:17

## 2021-07-29 ASSESSMENT — PAIN DESCRIPTION - PAIN TYPE
TYPE: SURGICAL PAIN
TYPE: SURGICAL PAIN

## 2021-07-29 ASSESSMENT — FIBROSIS 4 INDEX: FIB4 SCORE: 1.29

## 2021-07-29 NOTE — OP REPORT
Electrophysiology Procedure Note  [facility]      Date of procedure: 7/29/2021     Procedure Performed: Placement of AICD, moderate sedation administered by RN and supervised by physician    Indication: Chronic systolic heart failure, NYHA III, ICM, EF 20%    Physician(s): Alberto Isaac MD    Anesthesia: Moderate sedation,  start time 1508, stop time 1543  The moderate sedation document has been reviewed, signed and scanned into media.    Medications:  3mg Versed, 200mcg Fentanyl  2g Ancef    Specimen(s) Removed: None     Estimated Blood Loss:  30cc    Complications: None    Pre Procedure ECG: SR    Post Procedure ECG: SR    DESCRIPTION OF PROCEDURE: After informed written consent, the patient was brought to the electrophysiology lab in the fasting, unsedated state. The patient was prepped and draped in the usual sterile fashion. The procedure was performed under moderate sedation with local anesthetic. A left infraclavicular incision was made with a scalpel and the pre-pectoral device pocket was created using a combination of blunt dissection and electrocautery. The modified Seldinger technique was used to gain access to the left axillary vein times 2. Two peel-away hemostasis sheaths were placed in the vein. Under fluoroscopic guidance, the ICD and RA leads were introduced into the heart. The ventricular lead was advanced into the RVOT position the pulled back and advanced to the RV apex. The lead was tested and had satisfactory sensing and pacing parameters.  The Right atrial lead was placed and tested with good numbers and fixated with the normal mechanism. High output pacing did not produce extracardiac stimulation in either the RA or RV lead.  The leads were sutured to the underlying pectoral muscle with interrupted silk over a silastic suture sleeve. The device pocket was irrigated with antibiotic solution, inspected, and no bleeding was seen. The leads were connected to the ICD pulse  generator and the device was inserted into the pocket. The wound was closed with three layers of absorbable sutures and covered with Steri-Strips.   I personally supervised the administration of moderate sedation by the RN and observed the level of consciousness and physiologic status throughout the procedure.  Following recovery from sedation, the patient was transferred to a monitored bed in good condition.    IMPLANTED DEVICE INFORMATION:    Pulse generator is a [employer] model D233   Serial number [id number]      LEAD INFORMATION:  1. Right atrial lead is a [employer] model 7841, serial number [id number], P wave 3.6 millivolts, threshold 1.4 Volts, pacing impedance 682 Ohms.  2. Right ventricular lead is a [employer] model 0673, serial number [id number], R wave 11.8 millivolts, threshold 0.6 Volts , pacing impedance 575 Ohms.     DEVICE PROGRAMMING:    Freddie therapy: DDDR   Tachy therapy:  VT <200ms VF >220bpm Monitor >171 bpm    DEFIBRILLATION THRESHOLD TESTING:    Deferred    FLUOROSCOPY TIME: 2.4 min    SUMMARY/CONCLUSIONS:  1. Successful dual chamber ICD implantation.    RECOMMENDATIONS:  1. Admit to monitored bed.  2. PA and lateral chest x-ray.  3. Implantable cardioverter defibrillator interrogation prior to hospital discharge.  4. PO antibiotics for 5 days  5. Follow-up in device clinic for wound check and device interrogation.

## 2021-07-30 LAB — EKG IMPRESSION: NORMAL

## 2021-07-30 PROCEDURE — 93010 ELECTROCARDIOGRAM REPORT: CPT | Performed by: INTERNAL MEDICINE

## 2021-07-30 NOTE — PROGRESS NOTES
Patient is AO x 4, RASS 0, and denies pain, nausea, and SOB.  L. Chest dressing CDI - arm in sling.  VSS on RA.  Patient intermittently paced on monitor.  HR 60s.  Ambulated x 1 to restroom and able to void.  Wife updated by MD and was told [phone] approximate discharge time.  Waiting on Treatful Rep. To interrogate device.  Patient felt like his blood sugar was getting low in recovery.  Given 4oz juice and crackers in PACU.  Immediate improvement felt by patient.  Conversational / VSS / asymptomatic for hypoglycemia at this time.    1750 - Gary Treatful at bedside.    1755 - Report to Adrienne ENGLAND.  Wife Alysa updated and is waiting at surgical reception.

## 2021-07-30 NOTE — DISCHARGE INSTRUCTIONS
ACTIVITY: Rest and take it easy for the first 24 hours.  A responsible adult is recommended to remain with you during that time.  It is normal to feel sleepy.  We encourage you to not do anything that requires balance, judgment or coordination.    MILD FLU-LIKE SYMPTOMS ARE NORMAL. YOU MAY EXPERIENCE GENERALIZED MUSCLE ACHES, THROAT IRRITATION, HEADACHE AND/OR SOME NAUSEA.    FOR 24 HOURS DO NOT:  Drive, operate machinery or run household appliances.  Drink beer or alcoholic beverages.   Make important decisions or sign legal documents.    DIET: To avoid nausea, slowly advance diet as tolerated, avoiding spicy or greasy foods for the first day.  Add more substantial food to your diet according to your physician's instructions. INCREASE FLUIDS AND FIBER TO AVOID CONSTIPATION.    Instructions:    ICD discharge Instructions/[facility]    1.  No showers until seen in follow up; may take sponge bath.  Keep dressing dry & in place until seen at for you follow up visit at the cardiology office.   2.  No lifting over 10 lbs with affected arm for six weeks.  3.  Do not raise affected arm above shoulder level or behind head for six weeks.  4.  Avoid excessive pushing, pulling, or twisting for six weeks.  5.  No driving for the first week.  6.  Call our office ([phone]) if you notice any increased swelling, redness, warmth, or drainage at the implant site.  7.  Needs to be seen in emergency if you develop fever > 101F or uncontrolled pain.  8.  Always check with device clinic before any planned MRI to see if device is MRI compatible.  9.  No routine dental work or cleanings for 3 months.  10.  May remove arm sling after one day, but please wear if you have trouble remembering to keep your arm down.  Please wear at night as a reminder.   11. Do not place cell phones or mobile devices directly over implanted device.     ICD Therapy Instructions  If has 1 shock: if feeling fine can notify cardiology office and be  seen for interrogation.  If feeling poorly after needs to call 911.  2 Shocks or more in 24 hours: call 911.      FOLLOW-UP APPOINTMENT:  A follow-up appointment should be arranged with your doctor; call to schedule.    You should CALL YOUR PHYSICIAN if you develop:  Fever greater than 101 degrees F.  Pain not relieved by medication, or persistent nausea or vomiting.  Excessive bleeding (blood soaking through dressing) or unexpected drainage from the wound.  Extreme redness or swelling around the incision site, drainage of pus or foul smelling drainage.  Inability to urinate or empty your bladder within 8 hours.  Problems with breathing or chest pain.    You should call 911 if you develop problems with breathing or chest pain.  If you are unable to contact your doctor or surgical center, you should go to the nearest emergency room or urgent care center.  Physician's telephone #: Dr. Isaac, [phone].    If any questions arise, call your doctor.  If your doctor is not available, please feel free to call the Surgical Center at [phone]. The Contact Center is open Monday through Friday 7AM to 5PM and may speak to a nurse at [phone], or toll free at [phone].     A registered nurse may call you a few days after your surgery to see how you are doing after your procedure.    MEDICATIONS: Resume taking daily medication.  Take prescribed pain medication with food.  If no medication is prescribed, you may take non-aspirin pain medication if needed.  PAIN MEDICATION CAN BE VERY CONSTIPATING.  Take a stool softener or laxative such as senokot, pericolace, or milk of magnesia if needed.    Prescription given: none.  Last pain medication given: none.    If your physician has prescribed pain medication that includes Acetaminophen (Tylenol), do not take additional Acetaminophen (Tylenol) while taking the prescribed medication.    Depression / Suicide Risk    As you are discharged from [facility]  facility, it is important to learn how to keep safe from harming yourself.    Recognize the warning signs:  · Abrupt changes in personality, positive or negative- including increase in energy   · Giving away possessions  · Change in eating patterns- significant weight changes-  positive or negative  · Change in sleeping patterns- unable to sleep or sleeping all the time   · Unwillingness or inability to communicate  · Depression  · Unusual sadness, discouragement and loneliness  · Talk of wanting to die  · Neglect of personal appearance   · Rebelliousness- reckless behavior  · Withdrawal from people/activities they love  · Confusion- inability to concentrate     If you or a loved one observes any of these behaviors or has concerns about self-harm, here's what you can do:  · Talk about it- your feelings and reasons for harming yourself  · Remove any means that you might use to hurt yourself (examples: pills, rope, extension cords, firearm)  · Get professional help from the community (Mental Health, Substance Abuse, psychological counseling)  · Do not be alone:Call your Safe Contact- someone whom you trust who will be there for you.  · Call your local CRISIS HOTLINE 993-5138 or 408-789-6503  · Call your local Children's Mobile Crisis Response Team [place] (318) 439-0443 or www.[facility]  · Call the toll free National Suicide Prevention Hotlines   · National Suicide Prevention Lifeline 200-679-UITY (8250)  · National Hope Line Network 800-SUICIDE (468-6082)

## 2021-07-30 NOTE — OR NURSING
Assumed care of patient from PACU, see flow sheets for vital signs. Patient alert and oriented times four. Assessment completed. Surgical incision assessed, dressing clean, dry and intact. Pain is controlled and tolerable for patient. Patient updated of plan of care for discharge. Family/friend at bedside with patient. Discharge instructions reviewed and all questions answered. All needs met, patient dressed and safe to discharge home.   Patient tolerating fluids and food with no nausea.    1820: Patient up to bathroom and voided. Patient ready to go, last vital signs in flow sheet. PIV removed. Patient taken to car in wheelchair. Patient safe to discharge.

## 2021-08-05 ENCOUNTER — OFFICE VISIT (OUTPATIENT)
Dept: CARDIOLOGY | Facility: MEDICAL CENTER | Age: 71
End: 2021-08-05
Payer: MEDICARE

## 2021-08-05 ENCOUNTER — NON-PROVIDER VISIT (OUTPATIENT)
Dept: CARDIOLOGY | Facility: MEDICAL CENTER | Age: 71
End: 2021-08-05
Payer: MEDICARE

## 2021-08-05 VITALS
OXYGEN SATURATION: 98 % | BODY MASS INDEX: 22.75 KG/M2 | DIASTOLIC BLOOD PRESSURE: 66 MMHG | HEART RATE: 69 BPM | SYSTOLIC BLOOD PRESSURE: 98 MMHG | WEIGHT: 183 LBS | HEIGHT: 75 IN | RESPIRATION RATE: 14 BRPM

## 2021-08-05 DIAGNOSIS — I50.22 CHRONIC SYSTOLIC HEART FAILURE (HCC): Chronic | ICD-10-CM

## 2021-08-05 DIAGNOSIS — I48.0 PAROXYSMAL A-FIB (HCC): Chronic | ICD-10-CM

## 2021-08-05 DIAGNOSIS — Z95.810 AICD (AUTOMATIC CARDIOVERTER/DEFIBRILLATOR) PRESENT: ICD-10-CM

## 2021-08-05 PROCEDURE — 99215 OFFICE O/P EST HI 40 MIN: CPT | Performed by: INTERNAL MEDICINE

## 2021-08-05 PROCEDURE — 93283 PRGRMG EVAL IMPLANTABLE DFB: CPT | Performed by: INTERNAL MEDICINE

## 2021-08-05 RX ORDER — BUMETANIDE 0.5 MG/1
0.5 TABLET ORAL DAILY
Qty: 30 TABLET | Refills: 4 | Status: SHIPPED | OUTPATIENT
Start: 2021-08-05 | End: 2022-03-10

## 2021-08-05 ASSESSMENT — ENCOUNTER SYMPTOMS
PALPITATIONS: 0
LOSS OF CONSCIOUSNESS: 0
ORTHOPNEA: 0
ABDOMINAL PAIN: 0
PND: 0
FALLS: 0
DEPRESSION: 0

## 2021-08-05 ASSESSMENT — FIBROSIS 4 INDEX: FIB4 SCORE: 1.29

## 2021-08-05 NOTE — PROGRESS NOTES
"Chief Complaint   Patient presents with   • Coronary Artery Disease     F/V Dx: Coronary artery disease involving native coronary artery of native heart without angina pectoris   • Atrial Fibrillation     F/V Dx: Paroxysmal atrial fibrillation (HCC)       Subjective:   Francesco Schulte is a 70-year-old male presented clinic for follow-up on his coronary artery disease.    Pertinent history:  Coronary artery disease  2008 - PCI to the LAD, POBA to the diagonal.   2014 - anterior STEMI. POBA to the diagonal. PCI to the proximal circumflex.  Restented again in 2014 after his Plavix was stopped for an injection and patient has recurrent MI.  2016 - recurrent MI. POBA to unknown vessel  2017 - anterior STEMI. Status post PCI to the proximal and mid LAD    Ischemic cardiomyopathy  Paroxysmal atrial fibrillation, diagnosed 2016. Intolerant to beta blockers  TIA vs. CVA in 2016  Polycythemia vera, essential thrombocytosis  Carotid stenosis status post right CEA in March 2018  Hypertension  Hyperlipidemia, intolerant to statins      Since his last visit, patient has undergone an ICD placement for primary prevention given his cardiomyopathy.  Patient comes today with his wife and reports being confused because he was told that his heart function will improve from \"20% to 30%\" after he received the defibrillator.  He does not understand why his heart function has not improved.    Patient appears to be confused today about testing and procedures that have been performed for him.  He keeps confusing EKGs and echocardiograms with angiograms.    He denies any anginal symptoms.  He reports having dyspnea.   He had been tried on Lasix which he did not tolerate.    Past Medical History:   Diagnosis Date   • Agent orange exposure    • Anesthesia     resistant to pain meds   • Cancer (HCC)     polycythemia vera   • Diabetes (HCC)     insulin and oral meds   • Family history of punctured lung 2002    left lung   • Heart attack " ([facility]) 03/2017   • Hemorrhagic disorder ([facility])     on blood thinners   • High cholesterol    • Ischemic cardiomyopathy    • Kidney stones     hx of kidney stones   • Orthostatic hypotension 3/29/2018   • Pain     headache pain   • Polycythemia vera(238.4)    • Stroke ([facility]) 2016    r/t afib; eye issues resolved afterward   • Urinary bladder disorder     enlarged prostate, weak stream, difficulty urinating   • Vertigo      Past Surgical History:   Procedure Laterality Date   • SPLIT THICKNESS SKIN GRAFT N/A 8/23/2020    Procedure: APPLICATION, GRAFT, SKIN, SPLIT-THICKNESS;  Surgeon: Elisa Craig M.D.;  Location: [facility];  Service: Plastics   • SKIN ABSCESS INCISION AND DRAINAGE Right 8/3/2020    Procedure: INCISION AND DRAINAGE - SCROTAL WOUND - WOUND VAC PLACEMENT;  Surgeon: Jaylen Hayes M.D.;  Location: [facility];  Service: Urology   • PB EXPLORE SCROTUM Right 7/31/2020    Procedure: EXPLORATION, SCROTUM - FOR WASH OUT OF SCROTAL WOUND & WOUND VAC PLACEMENT;  Surgeon: Ferny Rosales M.D.;  Location: [facility];  Service: Urology   • PB EXPLORE SCROTUM Right 7/29/2020    Procedure: EXPLORATION, SCROTUM - FOR I & D OF SCROTAL AND  GROIN WOUND;  Surgeon: Donta Viera M.D.;  Location: [facility];  Service: Urology   • PB INCIS/DRAIN SCROTUM/TESTIS,EPIDIDYM Right 7/25/2020    Procedure: INCISION AND DRAINAGE, SCROTUM;  Surgeon: Nick Negro M.D.;  Location: [facility];  Service: Urology   • TEMPORAL ARTERY BIOPSY Right 6/26/2018    Procedure: TEMPORAL ARTERY BIOPSY;  Surgeon: Rajendra Aguilar M.D.;  Location: SURGERY SAME DAY [facility];  Service: General   • CAROTID ENDARTERECTOMY Right 3/21/2018    Procedure: CAROTID ENDARTERECTOMY- W/EEG MONITORING;  Surgeon: Benny Morfin M.D.;  Location: [facility];  Service: General   • APPENDECTOMY     • CATH PLACEMENT      right arm   • LAMINOTOMY      diskectomy; C4  "  • OTHER CARDIAC SURGERY      stents x 3     History reviewed. No pertinent family history.  Social History     Socioeconomic History   • Marital status:      Spouse name: Not on file   • Number of children: Not on file   • Years of education: Not on file   • Highest education level: Not on file   Occupational History   • Not on file   Tobacco Use   • Smoking status: Never Smoker   • Smokeless tobacco: Never Used   Vaping Use   • Vaping Use: Never used   Substance and Sexual Activity   • Alcohol use: No   • Drug use: No   • Sexual activity: Not on file   Other Topics Concern   • Not on file   Social History Narrative   • Not on file     Social Determinants of Health     Financial Resource Strain:    • Difficulty of Paying Living Expenses:    Food Insecurity:    • Worried About Running Out of Food in the Last Year:    • Ran Out of Food in the Last Year:    Transportation Needs:    • Lack of Transportation (Medical):    • Lack of Transportation (Non-Medical):    Physical Activity:    • Days of Exercise per Week:    • Minutes of Exercise per Session:    Stress:    • Feeling of Stress :    Social Connections:    • Frequency of Communication with Friends and Family:    • Frequency of Social Gatherings with Friends and Family:    • Attends Gnosticism Services:    • Active Member of Clubs or Organizations:    • Attends Club or Organization Meetings:    • Marital Status:    Intimate Partner Violence:    • Fear of Current or Ex-Partner:    • Emotionally Abused:    • Physically Abused:    • Sexually Abused:      Allergies   Allergen Reactions   • Doxycycline      Swelling lips and difficulties swallowing   • Beta Adrenergic Blockers Unspecified     dizziness   • Metformin Unspecified and Palpitations     Cardiac effects  \"Similar to a heart attack, I was hospitalized\"   • Simvastatin      Other reaction(s): Other (Specify with Comments)  Myalgias  Myalgias   • Statins [Hmg-Coa-R Inhibitors]      Muscle ache, joint pain "     Outpatient Encounter Medications as of 8/5/2021   Medication Sig Dispense Refill   • bumetanide (BUMEX) 0.5 MG Tab Take 1 tablet by mouth every day. 30 tablet 4   • colesevelam (WELCHOL) 625 MG Tab Take 625 mg by mouth every 48 hours.     • tamsulosin (FLOMAX) 0.4 MG capsule TAKE 2 CAPSULES BY MOUTH EVERY DAY (Patient taking differently: Take 0.4-0.8 mg by mouth 1 time a day as needed.) 180 capsule 2   • docusate sodium (COLACE) 100 MG Cap Take 1 capsule by mouth 2 times a day as needed for Constipation. 90 capsule 1   • spironolactone (ALDACTONE) 25 MG Tab Take 1 tablet by mouth every day. 90 tablet 3   • digoxin (LANOXIN) 125 MCG Tab Take 1 tablet by mouth every day at 6 PM. 90 tablet 3   • metoprolol SR (TOPROL XL) 25 MG TABLET SR 24 HR Take 1 tablet by mouth every evening. 90 tablet 3   • Blood Glucose Monitoring Suppl (ONETOUCH VERIO) w/Device Kit 1 Device 3 times a day before meals. 1 Kit 1   • finasteride (PROSCAR) 5 MG Tab TAKE 1 TABLET BY MOUTH EVERY DAY 90 tablet 1   • Insulin Degludec (TRESIBA FLEXTOUCH) 200 UNIT/ML Solution Pen-injector Inject 12 Units under the skin every evening. 3 mL 11   • Blood Glucose Test Strips verio test strips for glucose monitoring TID and  Each 5   • hydroxyurea (HYDREA) 500 MG Cap Take 500 mg by mouth 2 Times a Day.     • aspirin EC (ECOTRIN) 81 MG Tablet Delayed Response Take 1 Tab by mouth every day. 30 Tab    • therapeutic multivitamin-minerals (THERAGRAN-M) Tab Take 1 Tab by mouth every day.     • [DISCONTINUED] cephALEXin (KEFLEX) 500 MG Cap Take 1 capsule by mouth 3 times a day. (Patient not taking: Reported on 8/5/2021) 15 capsule 0   • Dapagliflozin Propanediol (FARXIGA) 10 MG Tab Take 1 tablet  by mouth every day. 30 tablet 11   • ELIQUIS 5 MG Tab TAKE 1 TABLET BY MOUTH TWICE A  tablet 3     No facility-administered encounter medications on file as of 8/5/2021.     Review of Systems   Constitutional: Negative for malaise/fatigue.   Respiratory:  "Positive for shortness of breath.    Cardiovascular: Negative for chest pain, palpitations, orthopnea, leg swelling and PND.   Gastrointestinal: Negative for abdominal pain.   Musculoskeletal: Negative for falls.   Neurological: Negative for dizziness and loss of consciousness.   Psychiatric/Behavioral: Negative for depression.   All other systems reviewed and are negative.       Objective:   BP (!) 98/66 (BP Location: Left arm, Patient Position: Sitting, BP Cuff Size: Adult)   Pulse 69   Resp 14   Ht 1.905 m (6' 3\")   Wt 83 kg (183 lb)   SpO2 98%   BMI 22.87 kg/m²     Physical Exam   Constitutional: He is oriented to person, place, and time. He appears well-developed. No distress.   HENT:   Head: Normocephalic and atraumatic.   Eyes: Conjunctivae are normal.   Musculoskeletal:      Cervical back: Neck supple.   Neurological: He is alert and oriented to person, place, and time.   Skin: No rash noted. He is not diaphoretic.   Psychiatric: His behavior is normal. Judgment and thought content normal.   Nursing note and vitals reviewed.      Assessment:     1. Chronic systolic heart failure (HCC)  Basic Metabolic Panel   2. Paroxysmal A-fib (HCC)         Medical Decision Making:  Today's Assessment / Status / Plan:     Patient appears confused today and is frustrated about misinformation that he may have gotten from other physicians.  I have explained to the patient that he only recently received the ICD and generally it is placed when his LV systolic function fails to recover with medical therapy.  His ICD was placed for primary prevention.  I also explained to him that an ejection fraction going from 20 to 30% is not necessarily an improvement as it is still severely depressed.  I suspect there was some miscommunication between the patient and Dr. Isaac about his LV function.  Despite my attempts at clarifying the issue, patient continued to be confused and upset.  I will therefore schedule a follow-up with Dr." Poncho so he can discuss this again with him.    For his ischemic cardiomyopathy, he reports having dyspnea.  We will start Bumex 0.5 mg once daily.  Chem-7 to be done in the next 2 weeks for reevaluation of renal function.  Continue metoprolol XL and spironolactone at current dose.  Patient does not have adequate room for initiation of ACE inhibitor/ARB.    For his atrial fibrillation, continue digoxin along with Eliquis.    41 minutes were spent on the patient's encounter on the date of service.  I was with the patient and his wife for 20 minutes.  The rest the time was spent reviewing the chart to help clarify the patient's concerns.     Return to clinic in 1 month or earlier if needed.    Thank you for allowing me to participate in the care of this patient. Please do not hesitate to contact me with any questions.    Liliana Song MD  Cardiologist  [facility]      PLEASE NOTE: This dictation was created using voice recognition software.

## 2021-08-06 ASSESSMENT — ENCOUNTER SYMPTOMS
DIZZINESS: 0
SHORTNESS OF BREATH: 1

## 2021-08-20 ENCOUNTER — HOSPITAL ENCOUNTER (OUTPATIENT)
Dept: LAB | Facility: MEDICAL CENTER | Age: 71
End: 2021-08-20
Attending: INTERNAL MEDICINE
Payer: MEDICARE

## 2021-08-20 ENCOUNTER — HOSPITAL ENCOUNTER (OUTPATIENT)
Dept: LAB | Facility: MEDICAL CENTER | Age: 71
End: 2021-08-20
Attending: NURSE PRACTITIONER
Payer: MEDICARE

## 2021-08-20 LAB
ALBUMIN SERPL BCP-MCNC: 3.5 G/DL (ref 3.2–4.9)
ALBUMIN SERPL BCP-MCNC: 3.5 G/DL (ref 3.2–4.9)
ALBUMIN/GLOB SERPL: 0.7 G/DL
ALBUMIN/GLOB SERPL: 0.7 G/DL
ALP SERPL-CCNC: 63 U/L (ref 30–99)
ALP SERPL-CCNC: 63 U/L (ref 30–99)
ALT SERPL-CCNC: 18 U/L (ref 2–50)
ALT SERPL-CCNC: 18 U/L (ref 2–50)
AMYLASE SERPL-CCNC: 66 U/L (ref 25–125)
ANION GAP SERPL CALC-SCNC: 10 MMOL/L (ref 7–16)
ANION GAP SERPL CALC-SCNC: 10 MMOL/L (ref 7–16)
AST SERPL-CCNC: 19 U/L (ref 12–45)
AST SERPL-CCNC: 21 U/L (ref 12–45)
BASOPHILS # BLD AUTO: 0.7 % (ref 0–1.8)
BASOPHILS # BLD: 0.04 K/UL (ref 0–0.12)
BILIRUB SERPL-MCNC: 0.7 MG/DL (ref 0.1–1.5)
BILIRUB SERPL-MCNC: 0.7 MG/DL (ref 0.1–1.5)
BUN SERPL-MCNC: 20 MG/DL (ref 8–22)
BUN SERPL-MCNC: 20 MG/DL (ref 8–22)
CALCIUM SERPL-MCNC: 9.6 MG/DL (ref 8.4–10.2)
CALCIUM SERPL-MCNC: 9.6 MG/DL (ref 8.4–10.2)
CHLORIDE SERPL-SCNC: 100 MMOL/L (ref 96–112)
CHLORIDE SERPL-SCNC: 101 MMOL/L (ref 96–112)
CO2 SERPL-SCNC: 24 MMOL/L (ref 20–33)
CO2 SERPL-SCNC: 25 MMOL/L (ref 20–33)
COMMENT 1642: NORMAL
CREAT SERPL-MCNC: 0.93 MG/DL (ref 0.5–1.4)
CREAT SERPL-MCNC: 0.95 MG/DL (ref 0.5–1.4)
CRP SERPL HS-MCNC: 0.33 MG/DL (ref 0–0.75)
EOSINOPHIL # BLD AUTO: 0.13 K/UL (ref 0–0.51)
EOSINOPHIL NFR BLD: 2.3 % (ref 0–6.9)
ERYTHROCYTE [DISTWIDTH] IN BLOOD BY AUTOMATED COUNT: 73.9 FL (ref 35.9–50)
ERYTHROCYTE [SEDIMENTATION RATE] IN BLOOD BY WESTERGREN METHOD: 28 MM/HOUR (ref 0–20)
GLOBULIN SER CALC-MCNC: 5 G/DL (ref 1.9–3.5)
GLOBULIN SER CALC-MCNC: 5.1 G/DL (ref 1.9–3.5)
GLUCOSE SERPL-MCNC: 164 MG/DL (ref 65–99)
GLUCOSE SERPL-MCNC: 165 MG/DL (ref 65–99)
HCT VFR BLD AUTO: 49.7 % (ref 42–52)
HGB BLD-MCNC: 16.1 G/DL (ref 14–18)
IMM GRANULOCYTES # BLD AUTO: 0.03 K/UL (ref 0–0.11)
IMM GRANULOCYTES NFR BLD AUTO: 0.5 % (ref 0–0.9)
LIPASE SERPL-CCNC: 36 U/L (ref 7–58)
LYMPHOCYTES # BLD AUTO: 0.43 K/UL (ref 1–4.8)
LYMPHOCYTES NFR BLD: 7.5 % (ref 22–41)
MCH RBC QN AUTO: 33.4 PG (ref 27–33)
MCHC RBC AUTO-ENTMCNC: 32.4 G/DL (ref 33.7–35.3)
MCV RBC AUTO: 103.1 FL (ref 81.4–97.8)
MONOCYTES # BLD AUTO: 0.38 K/UL (ref 0–0.85)
MONOCYTES NFR BLD AUTO: 6.6 % (ref 0–13.4)
NEUTROPHILS # BLD AUTO: 4.72 K/UL (ref 1.82–7.42)
NEUTROPHILS NFR BLD: 82.4 % (ref 44–72)
NRBC # BLD AUTO: 0 K/UL
NRBC BLD-RTO: 0 /100 WBC
PLATELET # BLD AUTO: 191 K/UL (ref 164–446)
PLATELET BLD QL SMEAR: NORMAL
PMV BLD AUTO: 10.9 FL (ref 9–12.9)
POTASSIUM SERPL-SCNC: 4.1 MMOL/L (ref 3.6–5.5)
POTASSIUM SERPL-SCNC: 4.1 MMOL/L (ref 3.6–5.5)
PROT SERPL-MCNC: 8.5 G/DL (ref 6–8.2)
PROT SERPL-MCNC: 8.6 G/DL (ref 6–8.2)
RBC # BLD AUTO: 4.82 M/UL (ref 4.7–6.1)
RBC BLD AUTO: NORMAL
SODIUM SERPL-SCNC: 135 MMOL/L (ref 135–145)
SODIUM SERPL-SCNC: 135 MMOL/L (ref 135–145)
WBC # BLD AUTO: 5.7 K/UL (ref 4.8–10.8)

## 2021-08-20 PROCEDURE — 86160 COMPLEMENT ANTIGEN: CPT | Mod: 91

## 2021-08-20 PROCEDURE — 80053 COMPREHEN METABOLIC PANEL: CPT | Mod: 91

## 2021-08-20 PROCEDURE — 82150 ASSAY OF AMYLASE: CPT

## 2021-08-20 PROCEDURE — 86140 C-REACTIVE PROTEIN: CPT

## 2021-08-20 PROCEDURE — 85025 COMPLETE CBC W/AUTO DIFF WBC: CPT

## 2021-08-20 PROCEDURE — 83690 ASSAY OF LIPASE: CPT

## 2021-08-20 PROCEDURE — 80053 COMPREHEN METABOLIC PANEL: CPT

## 2021-08-20 PROCEDURE — 36415 COLL VENOUS BLD VENIPUNCTURE: CPT

## 2021-08-20 PROCEDURE — 85652 RBC SED RATE AUTOMATED: CPT

## 2021-08-21 LAB
C3 SERPL-MCNC: 68.2 MG/DL (ref 87–200)
C4 SERPL-MCNC: 7.7 MG/DL (ref 19–52)

## 2021-08-24 ENCOUNTER — TELEPHONE (OUTPATIENT)
Dept: CARDIOLOGY | Facility: MEDICAL CENTER | Age: 71
End: 2021-08-24

## 2021-08-24 NOTE — TELEPHONE ENCOUNTER
Called and spoke to patient. Had a very productive, useful conversation. Moved patients appts. He is scheduled to see Dr. Isaac tomorrow 08/25 and Dr. Morales next week 09/02.     -FAISAL Spence    Patient is calling to find out who is taking over for Etiennedevendra Merida. He believes it is JM. He wants to leave JM a brief professional message. He is wanting to talk to the  of the institute as he feels he is getting the run around. He states he is also getting conflicting information about his care. He would like to speak with a supervisor not a nurse. Please call him at [phone]. He states ok to use my chart or leave a message.      Thank you,    Tanna RAJAN

## 2021-08-25 ENCOUNTER — OFFICE VISIT (OUTPATIENT)
Dept: CARDIOLOGY | Facility: MEDICAL CENTER | Age: 71
End: 2021-08-25
Payer: MEDICARE

## 2021-08-25 VITALS
HEIGHT: 75 IN | SYSTOLIC BLOOD PRESSURE: 116 MMHG | WEIGHT: 182 LBS | DIASTOLIC BLOOD PRESSURE: 64 MMHG | BODY MASS INDEX: 22.63 KG/M2 | HEART RATE: 84 BPM | OXYGEN SATURATION: 99 %

## 2021-08-25 DIAGNOSIS — Z95.810 AICD (AUTOMATIC CARDIOVERTER/DEFIBRILLATOR) PRESENT: ICD-10-CM

## 2021-08-25 DIAGNOSIS — I48.0 PAROXYSMAL A-FIB (HCC): ICD-10-CM

## 2021-08-25 DIAGNOSIS — I49.5 SICK SINUS SYNDROME (HCC): ICD-10-CM

## 2021-08-25 PROCEDURE — 93000 ELECTROCARDIOGRAM COMPLETE: CPT | Mod: 59 | Performed by: INTERNAL MEDICINE

## 2021-08-25 PROCEDURE — 93283 PRGRMG EVAL IMPLANTABLE DFB: CPT | Mod: 26 | Performed by: INTERNAL MEDICINE

## 2021-08-25 PROCEDURE — 99215 OFFICE O/P EST HI 40 MIN: CPT | Mod: 25 | Performed by: INTERNAL MEDICINE

## 2021-08-25 ASSESSMENT — FIBROSIS 4 INDEX: FIB4 SCORE: 1.79

## 2021-08-25 NOTE — PROGRESS NOTES
Arrhythmia Clinic Note (Established patient)    DOS: 8/25/2021    Chief complaint/Reason for consult: CHF    Interval History: 72 y/o M with NICM EF 20%, recent dual chamber ICD implant, reported pAF history, CVA. Initially referred to me for CRT consideration but does not have LBBB so dual ICD was placed. He was confused after implant despite my detailed explanation last visit in clinic in July prior to ICD that it would not improve his HF, likely confused because prior APN visit probably suggested CRT would help (though this was never indicated).     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    ROS (+ highlighted in bold):  Constitutional: Fevers/chills/fatigue/weightloss  HEENT: Blurry vision/eye pain/sore throat/hearing loss  Respiratory: Shortness of breath/cough  Cardiovascular: Chest pain/palpitations/edema/orthopnea/syncope  GI: Nausea/vomitting/diarrhea  MSK: Arthralgias/myagias/muscle weakness  Skin: Rash/sores  Neurological: Numbness/tremors/vertigo  Endocrine: Excessive thirst/polyuria/cold intolerance/heat intolerance  Psych: Depression/anxiety    Past Medical History:   Diagnosis Date   • Agent orange exposure    • Anesthesia     resistant to pain meds   • Cancer (HCC)     polycythemia vera   • Diabetes (HCC)     insulin and oral meds   • Family history of punctured lung 2002    left lung   • Heart attack (HCC) 03/2017   • Hemorrhagic disorder (HCC)     on blood thinners   • High cholesterol    • Ischemic cardiomyopathy    • Kidney stones     hx of kidney stones   • Orthostatic hypotension 3/29/2018   • Pain     headache pain   • Polycythemia vera(238.4)    • Stroke (HCC) 2016    r/t afib; eye  issues resolved afterward   • Urinary bladder disorder     enlarged prostate, weak stream, difficulty urinating   • Vertigo        Past Surgical History:   Procedure Laterality Date   • SPLIT THICKNESS SKIN GRAFT N/A 8/23/2020    Procedure: APPLICATION, GRAFT, SKIN, SPLIT-THICKNESS;  Surgeon: Elisa Craig M.D.;  Location: [facility];  Service: Plastics   • SKIN ABSCESS INCISION AND DRAINAGE Right 8/3/2020    Procedure: INCISION AND DRAINAGE - SCROTAL WOUND - WOUND VAC PLACEMENT;  Surgeon: Jaylen Hayes M.D.;  Location: [facility];  Service: Urology   • PB EXPLORE SCROTUM Right 7/31/2020    Procedure: EXPLORATION, SCROTUM - FOR WASH OUT OF SCROTAL WOUND & WOUND VAC PLACEMENT;  Surgeon: Ferny Rosales M.D.;  Location: [facility];  Service: Urology   • PB EXPLORE SCROTUM Right 7/29/2020    Procedure: EXPLORATION, SCROTUM - FOR I & D OF SCROTAL AND  GROIN WOUND;  Surgeon: Donta Viera M.D.;  Location: [facility];  Service: Urology   • PB INCIS/DRAIN SCROTUM/TESTIS,EPIDIDYM Right 7/25/2020    Procedure: INCISION AND DRAINAGE, SCROTUM;  Surgeon: Nick Negro M.D.;  Location: [facility];  Service: Urology   • TEMPORAL ARTERY BIOPSY Right 6/26/2018    Procedure: TEMPORAL ARTERY BIOPSY;  Surgeon: Rajendra Aguilar M.D.;  Location: SURGERY SAME DAY [facility];  Service: General   • CAROTID ENDARTERECTOMY Right 3/21/2018    Procedure: CAROTID ENDARTERECTOMY- W/EEG MONITORING;  Surgeon: Benny Morfin M.D.;  Location: [facility];  Service: General   • APPENDECTOMY     • CATH PLACEMENT      right arm   • LAMINOTOMY      diskectomy; C4   • OTHER CARDIAC SURGERY      stents x 3       Social History     Socioeconomic History   • Marital status:      Spouse name: Not on file   • Number of children: Not on file   • Years of education: Not on file   • Highest education level: Not on file   Occupational History   • Not  "on file   Tobacco Use   • Smoking status: Never Smoker   • Smokeless tobacco: Never Used   Vaping Use   • Vaping Use: Never used   Substance and Sexual Activity   • Alcohol use: No   • Drug use: No   • Sexual activity: Not on file   Other Topics Concern   • Not on file   Social History Narrative   • Not on file     Social Determinants of Health     Financial Resource Strain:    • Difficulty of Paying Living Expenses:    Food Insecurity:    • Worried About Running Out of Food in the Last Year:    • Ran Out of Food in the Last Year:    Transportation Needs:    • Lack of Transportation (Medical):    • Lack of Transportation (Non-Medical):    Physical Activity:    • Days of Exercise per Week:    • Minutes of Exercise per Session:    Stress:    • Feeling of Stress :    Social Connections:    • Frequency of Communication with Friends and Family:    • Frequency of Social Gatherings with Friends and Family:    • Attends Rastafari Services:    • Active Member of Clubs or Organizations:    • Attends Club or Organization Meetings:    • Marital Status:    Intimate Partner Violence:    • Fear of Current or Ex-Partner:    • Emotionally Abused:    • Physically Abused:    • Sexually Abused:        History reviewed. No pertinent family history.    Allergies   Allergen Reactions   • Doxycycline      Swelling lips and difficulties swallowing   • Beta Adrenergic Blockers Unspecified     dizziness   • Metformin Unspecified and Palpitations     Cardiac effects  \"Similar to a heart attack, I was hospitalized\"   • Simvastatin      Other reaction(s): Other (Specify with Comments)  Myalgias  Myalgias   • Statins [Hmg-Coa-R Inhibitors]      Muscle ache, joint pain       Current Outpatient Medications   Medication Sig Dispense Refill   • bumetanide (BUMEX) 0.5 MG Tab Take 1 tablet by mouth every day. 30 tablet 4   • tamsulosin (FLOMAX) 0.4 MG capsule TAKE 2 CAPSULES BY MOUTH EVERY DAY (Patient taking differently: Take 0.4-0.8 mg by mouth 1 " "time a day as needed.) 180 capsule 2   • docusate sodium (COLACE) 100 MG Cap Take 1 capsule by mouth 2 times a day as needed for Constipation. 90 capsule 1   • ELIQUIS 5 MG Tab TAKE 1 TABLET BY MOUTH TWICE A  tablet 3   • spironolactone (ALDACTONE) 25 MG Tab Take 1 tablet by mouth every day. 90 tablet 3   • digoxin (LANOXIN) 125 MCG Tab Take 1 tablet by mouth every day at 6 PM. 90 tablet 3   • metoprolol SR (TOPROL XL) 25 MG TABLET SR 24 HR Take 1 tablet by mouth every evening. 90 tablet 3   • Blood Glucose Monitoring Suppl (ONETOUCH VERIO) w/Device Kit 1 Device 3 times a day before meals. 1 Kit 1   • finasteride (PROSCAR) 5 MG Tab TAKE 1 TABLET BY MOUTH EVERY DAY 90 tablet 1   • Insulin Degludec (TRESIBA FLEXTOUCH) 200 UNIT/ML Solution Pen-injector Inject 12 Units under the skin every evening. 3 mL 11   • Blood Glucose Test Strips verio test strips for glucose monitoring TID and  Each 5   • hydroxyurea (HYDREA) 500 MG Cap Take 500 mg by mouth 2 Times a Day.     • aspirin EC (ECOTRIN) 81 MG Tablet Delayed Response Take 1 Tab by mouth every day. 30 Tab    • therapeutic multivitamin-minerals (THERAGRAN-M) Tab Take 1 Tab by mouth every day.     • colesevelam (WELCHOL) 625 MG Tab Take 625 mg by mouth every 48 hours. (Patient not taking: Reported on 8/25/2021)     • Dapagliflozin Propanediol (FARXIGA) 10 MG Tab Take 1 tablet  by mouth every day. (Patient not taking: Reported on 8/25/2021) 30 tablet 11     No current facility-administered medications for this visit.       Physical Exam:  Vitals:    08/25/21 1326   BP: 116/64   BP Location: Left arm   Patient Position: Sitting   BP Cuff Size: Adult   Pulse: 84   SpO2: 99%   Weight: 82.6 kg (182 lb)   Height: 1.905 m (6' 3\")     General appearance: NAD, conversant   Eyes: anicteric sclerae, moist conjunctivae; no lid-lag; PERRLA  HENT: Atraumatic; oropharynx clear with moist mucous membranes and no mucosal ulcerations; normal hard and soft palate  Neck: " Trachea midline; FROM, supple, no thyromegaly or lymphadenopathy  Lungs: CTA, with normal respiratory effort and no intercostal retractions  CV: RRR, no MRGs, no JVD  Abdomen: Soft, non-tender; no masses or HSM  Extremities: No peripheral edema or extremity lymphadenopathy  Skin: Normal temperature, turgor and texture; no rash, ulcers or subcutaneous nodules  Psych: Appropriate affect, alert and oriented to person, place and time    Data:  Lipids:   Lab Results   Component Value Date/Time    CHOLSTRLTOT 145 01/20/2021 09:32 AM    TRIGLYCERIDE 110 01/20/2021 09:32 AM    HDL 33 (A) 01/20/2021 09:32 AM    LDL 90 01/20/2021 09:32 AM        BMP:  Lab Results   Component Value Date/Time    SODIUM 135 08/20/2021 1442    POTASSIUM 4.1 08/20/2021 1442    CHLORIDE 101 08/20/2021 1442    CO2 24 08/20/2021 1442    GLUCOSE 165 (H) 08/20/2021 1442    BUN 20 08/20/2021 1442    CREATININE 0.95 08/20/2021 1442    CALCIUM 9.6 08/20/2021 1442    ANION 10.0 08/20/2021 1442        TSH:   Lab Results   Component Value Date/Time    TSHULTRASEN 2.630 05/28/2021 2022        THYROXINE (T4):   No results found for: SHALOM     CBC:   Lab Results   Component Value Date/Time    WBC 5.7 08/20/2021 02:42 PM    RBC 4.82 08/20/2021 02:42 PM    HEMOGLOBIN 16.1 08/20/2021 02:42 PM    HEMATOCRIT 49.7 08/20/2021 02:42 PM    .1 (H) 08/20/2021 02:42 PM    MCH 33.4 (H) 08/20/2021 02:42 PM    MCHC 32.4 (L) 08/20/2021 02:42 PM    RDW 73.9 (H) 08/20/2021 02:42 PM    PLATELETCT 191 08/20/2021 02:42 PM    MPV 10.9 08/20/2021 02:42 PM    NEUTSPOLYS 82.40 (H) 08/20/2021 02:42 PM    LYMPHOCYTES 7.50 (L) 08/20/2021 02:42 PM    MONOCYTES 6.60 08/20/2021 02:42 PM    EOSINOPHILS 2.30 08/20/2021 02:42 PM    BASOPHILS 0.70 08/20/2021 02:42 PM    IMMGRAN 0.50 08/20/2021 02:42 PM    NRBC 0.00 08/20/2021 02:42 PM    NEUTS 4.72 08/20/2021 02:42 PM    LYMPHS 0.43 (L) 08/20/2021 02:42 PM    MONOS 0.38 08/20/2021 02:42 PM    EOS 0.13 08/20/2021 02:42 PM    BASO 0.04  08/20/2021 02:42 PM    IMMGRANAB 0.03 08/20/2021 02:42 PM    NRBCAB 0.00 08/20/2021 02:42 PM        CBC w/o DIFF  Lab Results   Component Value Date/Time    WBC 5.7 08/20/2021 02:42 PM    RBC 4.82 08/20/2021 02:42 PM    HEMOGLOBIN 16.1 08/20/2021 02:42 PM    .1 (H) 08/20/2021 02:42 PM    MCH 33.4 (H) 08/20/2021 02:42 PM    MCHC 32.4 (L) 08/20/2021 02:42 PM    RDW 73.9 (H) 08/20/2021 02:42 PM    MPV 10.9 08/20/2021 02:42 PM       Prior echo/stress reviewed: EF 20%    EKG interpreted by me: ST with incomplete LBBB QRS 113ms    Impression/Plan:  1. Paroxysmal A-fib (HCC)  EKG   2. Sick sinus syndrome (HCC)     3. AICD (automatic cardioverter/defibrillator) present       1. pAF remote history  2. NICM EF 20%  3. Chronic systolic HF NYHA III  4. ICD in situ    - Continue MTP XL unable to titrate up further due to hypotension  - Unable to tolerate ACEi in the past due to hypotension  - On Aldactone  - On Eliquis for pAF, no Afib found on the device so far    He will follow longitudinally with a new cardiologist now that Dr Song has left and will follow with device clinic remotely.    One hour was spent on this encounter, >50% was spent face to face.    Alberto Isaac MD  Cardiac Electrophysiology

## 2021-08-26 ENCOUNTER — TELEPHONE (OUTPATIENT)
Dept: CARDIOLOGY | Facility: MEDICAL CENTER | Age: 71
End: 2021-08-26

## 2021-08-26 NOTE — TELEPHONE ENCOUNTER
Spoke to patient in regards to records for NP appointment with Dr. Morales on 09/02/2021 at 10:00am. 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 All recent records in epic including blood work, cardiac testing, and EKG. Confirmed appt date, time and location.

## 2021-09-01 LAB — EKG IMPRESSION: NORMAL

## 2021-09-02 ENCOUNTER — OFFICE VISIT (OUTPATIENT)
Dept: CARDIOLOGY | Facility: MEDICAL CENTER | Age: 71
End: 2021-09-02
Payer: MEDICARE

## 2021-09-02 VITALS
WEIGHT: 179.8 LBS | HEART RATE: 66 BPM | BODY MASS INDEX: 22.36 KG/M2 | HEIGHT: 75 IN | OXYGEN SATURATION: 99 % | SYSTOLIC BLOOD PRESSURE: 122 MMHG | DIASTOLIC BLOOD PRESSURE: 68 MMHG | RESPIRATION RATE: 16 BRPM

## 2021-09-02 DIAGNOSIS — I25.83 CORONARY ARTERY DISEASE DUE TO LIPID RICH PLAQUE: ICD-10-CM

## 2021-09-02 DIAGNOSIS — I25.10 CORONARY ARTERY DISEASE DUE TO LIPID RICH PLAQUE: ICD-10-CM

## 2021-09-02 DIAGNOSIS — Z45.02 ICD (IMPLANTABLE CARDIOVERTER-DEFIBRILLATOR) BATTERY DEPLETION: ICD-10-CM

## 2021-09-02 DIAGNOSIS — I25.5 ISCHEMIC CARDIOMYOPATHY: ICD-10-CM

## 2021-09-02 DIAGNOSIS — Z88.8 ALLERGY TO STATIN MEDICATION: ICD-10-CM

## 2021-09-02 PROCEDURE — 99214 OFFICE O/P EST MOD 30 MIN: CPT | Performed by: INTERNAL MEDICINE

## 2021-09-02 RX ORDER — NITROGLYCERIN 0.4 MG/1
0.4 TABLET SUBLINGUAL PRN
Qty: 25 TABLET | Refills: 3 | Status: ON HOLD | OUTPATIENT
Start: 2021-09-02 | End: 2023-01-01

## 2021-09-02 ASSESSMENT — FIBROSIS 4 INDEX: FIB4 SCORE: 1.79

## 2021-09-02 NOTE — PROGRESS NOTES
"CARDIOLOGY OUTPATIENT FOLLOWUP    PCP: ALVINO Harman    1. Ischemic cardiomyopathy    2. ICD (implantable cardioverter-defibrillator) battery depletion    3. Allergy to statin medication    4. Coronary artery disease due to lipid rich plaque      Francesco Schulte has class I heart failure with severe ischemic cardiomyopathy due to anterior infarct.  He is stable and I therefore made no changes to the medication regimen and given the class I state did not recommend spironolactone therapy or dapagliflozin which have been poorly tolerated.  She will continue on digoxin.  I did provide him a letter releasing him back to unrestricted driving.    Follow up: 6 months    Chief Complaint   Patient presents with   • Atrial Fibrillation     Dx: Paroxysmal A-fib (HCC)   • Congestive Heart Failure     Dx: Chronic systolic heart failure (HCC)   • Dyslipidemia     Dx: Dyslipidemia       History: Francesco Schulte is a 71 y.o. male with history of ischemic cardiomyopathy due to anterior infarct circa 2016 presenting for follow-up.  He recently underwent AICD implantation after ejection fraction gradually declined.  An MPI showed no evidence of ischemia but confirmed the infarct.  He has no cardiovascular symptoms.    He continues to be very physically active and is hopeful that he can maintain quality of life for another year.    We discussed his prior heavy exposure to agent orange as well as secret  work developing offroad vehicles.      ROS:   All other systems reviewed and negative except as per the HPI    PE:  /68 (BP Location: Left arm, Patient Position: Sitting, BP Cuff Size: Adult)   Pulse 66   Resp 16   Ht 1.905 m (6' 3\")   Wt 81.6 kg (179 lb 12.8 oz)   SpO2 99%   BMI 22.47 kg/m²   Gen: no acute distress  HEENT: Symmetric face. Anicteric sclerae. Moist mucus membranes  NECK: No JVD. No lymphadenopathy  CARDIAC: Regular, enlarged and diffuse PMI.  No murmurs  VASCULATURE: carotids " "are normal bilaterally without bruit  RESP: Clear to auscultation bilaterally  ABD: Soft, non-tender, non-distended  EXT: No edema, no clubbing or cyanosis  SKIN: Warm and dry  NEURO: No gross deficits  PSYCH: Appropriate affect, participates in conversation    The ASCVD Risk score (Lynco IMER Jr, et al., 2013) failed to calculate.    Past Medical History:   Diagnosis Date   • Agent orange exposure    • Anesthesia     resistant to pain meds   • Cancer (HCC)     polycythemia vera   • Diabetes (HCC)     insulin and oral meds   • Family history of punctured lung 2002    left lung   • Heart attack (HCC) 03/2017   • Hemorrhagic disorder (HCC)     on blood thinners   • High cholesterol    • Ischemic cardiomyopathy    • Kidney stones     hx of kidney stones   • Orthostatic hypotension 3/29/2018   • Pain     headache pain   • Polycythemia vera(238.4)    • Stroke (HCC) 2016    r/t afib; eye issues resolved afterward   • Urinary bladder disorder     enlarged prostate, weak stream, difficulty urinating   • Vertigo      Allergies   Allergen Reactions   • Doxycycline      Swelling lips and difficulties swallowing   • Atorvastatin    • Beta Adrenergic Blockers Unspecified     dizziness   • Metformin Unspecified and Palpitations     Cardiac effects  \"Similar to a heart attack, I was hospitalized\"   • Simvastatin      Other reaction(s): Other (Specify with Comments)  Myalgias  Myalgias   • Statins [Hmg-Coa-R Inhibitors]      Muscle ache, joint pain     Outpatient Encounter Medications as of 9/2/2021   Medication Sig Dispense Refill   • nitroglycerin (NITROSTAT) 0.4 MG SL Tab Place 1 Tablet under the tongue as needed for Chest Pain (or hypertension >180/110). 25 Tablet 3   • bumetanide (BUMEX) 0.5 MG Tab Take 1 tablet by mouth every day. 30 tablet 4   • docusate sodium (COLACE) 100 MG Cap Take 1 capsule by mouth 2 times a day as needed for Constipation. 90 capsule 1   • ELIQUIS 5 MG Tab TAKE 1 TABLET BY MOUTH TWICE A  tablet 3 "   • digoxin (LANOXIN) 125 MCG Tab Take 1 tablet by mouth every day at 6 PM. 90 tablet 3   • metoprolol SR (TOPROL XL) 25 MG TABLET SR 24 HR Take 1 tablet by mouth every evening. 90 tablet 3   • Blood Glucose Monitoring Suppl (ONETOUCH VERIO) w/Device Kit 1 Device 3 times a day before meals. 1 Kit 1   • finasteride (PROSCAR) 5 MG Tab TAKE 1 TABLET BY MOUTH EVERY DAY 90 tablet 1   • Insulin Degludec (TRESIBA FLEXTOUCH) 200 UNIT/ML Solution Pen-injector Inject 12 Units under the skin every evening. 3 mL 11   • Blood Glucose Test Strips verio test strips for glucose monitoring TID and  Each 5   • hydroxyurea (HYDREA) 500 MG Cap Take 500 mg by mouth 2 Times a Day.     • aspirin EC (ECOTRIN) 81 MG Tablet Delayed Response Take 1 Tab by mouth every day. 30 Tab    • therapeutic multivitamin-minerals (THERAGRAN-M) Tab Take 1 Tab by mouth every day.     • [DISCONTINUED] Dapagliflozin Propanediol (FARXIGA) 10 MG Tab Take 1 tablet  by mouth every day. (Patient not taking: Reported on 8/25/2021) 30 tablet 11   • [DISCONTINUED] tamsulosin (FLOMAX) 0.4 MG capsule TAKE 2 CAPSULES BY MOUTH EVERY DAY (Patient taking differently: Take 0.4-0.8 mg by mouth 1 time a day as needed.) 180 capsule 2   • [DISCONTINUED] spironolactone (ALDACTONE) 25 MG Tab Take 1 tablet by mouth every day. (Patient not taking: Reported on 9/2/2021) 90 tablet 3     No facility-administered encounter medications on file as of 9/2/2021.     Social History     Socioeconomic History   • Marital status:      Spouse name: Not on file   • Number of children: Not on file   • Years of education: Not on file   • Highest education level: Not on file   Occupational History   • Not on file   Tobacco Use   • Smoking status: Never Smoker   • Smokeless tobacco: Never Used   Vaping Use   • Vaping Use: Never used   Substance and Sexual Activity   • Alcohol use: No   • Drug use: No   • Sexual activity: Not on file   Other Topics Concern   • Not on file   Social  History Narrative   • Not on file     Social Determinants of Health     Financial Resource Strain:    • Difficulty of Paying Living Expenses:    Food Insecurity:    • Worried About Running Out of Food in the Last Year:    • Ran Out of Food in the Last Year:    Transportation Needs:    • Lack of Transportation (Medical):    • Lack of Transportation (Non-Medical):    Physical Activity:    • Days of Exercise per Week:    • Minutes of Exercise per Session:    Stress:    • Feeling of Stress :    Social Connections:    • Frequency of Communication with Friends and Family:    • Frequency of Social Gatherings with Friends and Family:    • Attends [employer] Services:    • Active Member of Clubs or Organizations:    • Attends Club or Organization Meetings:    • Marital Status:    Intimate Partner Violence:    • Fear of Current or Ex-Partner:    • Emotionally Abused:    • Physically Abused:    • Sexually Abused:        Studies  Lab Results   Component Value Date/Time    CHOLSTRLTOT 145 01/20/2021 09:32 AM    LDL 90 01/20/2021 09:32 AM    HDL 33 (A) 01/20/2021 09:32 AM    TRIGLYCERIDE 110 01/20/2021 09:32 AM       Lab Results   Component Value Date/Time    SODIUM 135 08/20/2021 02:42 PM    POTASSIUM 4.1 08/20/2021 02:42 PM    CHLORIDE 101 08/20/2021 02:42 PM    CO2 24 08/20/2021 02:42 PM    GLUCOSE 165 (H) 08/20/2021 02:42 PM    BUN 20 08/20/2021 02:42 PM    CREATININE 0.95 08/20/2021 02:42 PM     Lab Results   Component Value Date/Time    ALKPHOSPHAT 63 08/20/2021 02:42 PM    ASTSGOT 21 08/20/2021 02:42 PM    ALTSGPT 18 08/20/2021 02:42 PM    TBILIRUBIN 0.7 08/20/2021 02:42 PM        For this encounter I reviewed the following  (EP) notes, BMP, Lipid profile and CBC

## 2021-09-03 ENCOUNTER — TELEPHONE (OUTPATIENT)
Dept: CARDIOLOGY | Facility: MEDICAL CENTER | Age: 71
End: 2021-09-03

## 2021-09-03 NOTE — TELEPHONE ENCOUNTER
Received clearance request from [facility] for pt's endoscopic ultrasound with propofol sedation. Date TBD. They would also like him to hold Eliquis 2 days prior.

## 2021-09-07 NOTE — TELEPHONE ENCOUNTER
You  Hussain Morales M.D. 4 days ago     Ok to proceed with endoscopic ultrasound and hold Eliquis prior?   Thanks!      Hussain Morales M.D.  You 22 hours ago (11:44 AM)     Ok to proceed with EUS and can hold eliquis.     Thanks   BE      Form completed, signed by BE, and faxed to [phone]. Receipt confirmed. Form sent to scanning.

## 2021-09-14 ENCOUNTER — NON-PROVIDER VISIT (OUTPATIENT)
Dept: CARDIOLOGY | Facility: MEDICAL CENTER | Age: 71
End: 2021-09-14
Payer: MEDICARE

## 2021-09-14 VITALS
HEART RATE: 84 BPM | SYSTOLIC BLOOD PRESSURE: 122 MMHG | WEIGHT: 184 LBS | OXYGEN SATURATION: 97 % | RESPIRATION RATE: 16 BRPM | BODY MASS INDEX: 22.88 KG/M2 | DIASTOLIC BLOOD PRESSURE: 60 MMHG | HEIGHT: 75 IN

## 2021-09-14 DIAGNOSIS — Z95.810 AICD (AUTOMATIC CARDIOVERTER/DEFIBRILLATOR) PRESENT: ICD-10-CM

## 2021-09-14 DIAGNOSIS — I50.22 CHRONIC SYSTOLIC HEART FAILURE (HCC): Chronic | ICD-10-CM

## 2021-09-14 DIAGNOSIS — I48.0 PAROXYSMAL A-FIB (HCC): Chronic | ICD-10-CM

## 2021-09-14 PROBLEM — R42 DIZZINESS: Status: RESOLVED | Noted: 2018-03-10 | Resolved: 2021-09-14

## 2021-09-14 PROBLEM — Z91.81 RISK FOR FALLS: Status: RESOLVED | Noted: 2017-09-13 | Resolved: 2021-09-14

## 2021-09-14 PROBLEM — R07.9 PAIN IN THE CHEST: Status: RESOLVED | Noted: 2019-01-05 | Resolved: 2021-09-14

## 2021-09-14 PROBLEM — G44.52 NEW DAILY PERSISTENT HEADACHE: Status: RESOLVED | Noted: 2018-04-04 | Resolved: 2021-09-14

## 2021-09-14 PROBLEM — R22.0 LIP SWELLING: Status: RESOLVED | Noted: 2020-07-01 | Resolved: 2021-09-14

## 2021-09-14 PROBLEM — K30 INDIGESTION: Status: RESOLVED | Noted: 2019-04-25 | Resolved: 2021-09-14

## 2021-09-14 PROBLEM — R11.0 NAUSEA: Status: RESOLVED | Noted: 2018-08-09 | Resolved: 2021-09-14

## 2021-09-14 PROBLEM — Z78.9 MEDICATION INTOLERANCE: Status: RESOLVED | Noted: 2020-07-01 | Resolved: 2021-09-14

## 2021-09-14 PROBLEM — R79.89 ELEVATED BRAIN NATRIURETIC PEPTIDE (BNP) LEVEL: Status: RESOLVED | Noted: 2020-07-01 | Resolved: 2021-09-14

## 2021-09-14 PROBLEM — T88.7XXA MEDICATION SIDE EFFECT: Status: RESOLVED | Noted: 2020-09-22 | Resolved: 2021-09-14

## 2021-09-14 PROBLEM — I31.39 PERICARDIAL EFFUSION: Status: RESOLVED | Noted: 2020-10-01 | Resolved: 2021-09-14

## 2021-09-14 PROBLEM — J18.9 COMMUNITY ACQUIRED PNEUMONIA: Status: RESOLVED | Noted: 2021-01-02 | Resolved: 2021-09-14

## 2021-09-14 PROBLEM — I16.0 HYPERTENSIVE URGENCY: Status: RESOLVED | Noted: 2021-05-29 | Resolved: 2021-09-14

## 2021-09-14 PROBLEM — R42 VERTIGO: Status: RESOLVED | Noted: 2018-08-09 | Resolved: 2021-09-14

## 2021-09-14 PROCEDURE — 93283 PRGRMG EVAL IMPLANTABLE DFB: CPT | Performed by: NURSE PRACTITIONER

## 2021-09-14 RX ORDER — DICYCLOMINE HYDROCHLORIDE 10 MG/1
10 CAPSULE ORAL DAILY
Status: ON HOLD | COMMUNITY
Start: 2021-09-13 | End: 2021-11-09

## 2021-09-14 ASSESSMENT — FIBROSIS 4 INDEX: FIB4 SCORE: 1.79

## 2021-09-14 NOTE — PROGRESS NOTES
Patient had AICD implanted on 7/29/2021, and is here today for six week final threshold checks.    Device is working normally.  No device therapy.  No mode switching episodes.  Normal sensing and capture of RA and RV leads; stable impedances. Battery charge time is 8.9 seconds; battery longevity is 13 years.     Changes today include decreasing RA and RV outputs to 2.0 volts.    FU in 6 months for next AICD check.

## 2021-09-15 ENCOUNTER — PATIENT MESSAGE (OUTPATIENT)
Dept: CARDIOLOGY | Facility: MEDICAL CENTER | Age: 71
End: 2021-09-15

## 2021-09-21 NOTE — DISCHARGE PLANNING
Meds-to-Beds: Discharge prescription orders listed below delivered to patient's bedside. RN notified. Patient counseled. Patient elected to have co-payment billed to patient account.     Hydroxyurea too soon through insurance. 90-day supply filled at [facility] pharmacy.       Bob Schulte Jaylen   Home Medication Instructions IRAIDA:[id number]    Printed on:08/31/20 3814   Medication Information                      amoxicillin-clavulanate (AUGMENTIN) 875-125 MG Tab  Take 1 Tab by mouth every 12 hours for 8 days.             fluconazole (DIFLUCAN) 200 MG Tab  Take 1 Tab by mouth every day for 8 days.             gabapentin (NEURONTIN) 300 MG Cap  Take 2 Caps by mouth 3 times a day.             oxyCODONE immediate release (ROXICODONE) 10 MG immediate release tablet  Take 1 Tab by mouth every 6 hours as needed for Severe Pain for up to 5 days.               Isa Smith, PharmD     Opioid Counseling: I discussed with the patient the potential side effects of opioids including but not limited to addiction, altered mental status, and depression. I stressed avoiding alcohol, benzodiazepines, muscle relaxants and sleep aids unless specifically okayed by a physician. The patient verbalized understanding of the proper use and possible adverse effects of opioids. All of the patient's questions and concerns were addressed. They were instructed to flush the remaining pills down the toilet if they did not need them for pain.

## 2021-09-28 ENCOUNTER — PATIENT MESSAGE (OUTPATIENT)
Dept: CARDIOLOGY | Facility: MEDICAL CENTER | Age: 71
End: 2021-09-28

## 2021-09-28 DIAGNOSIS — I48.0 PAF (PAROXYSMAL ATRIAL FIBRILLATION) (HCC): ICD-10-CM

## 2021-09-28 NOTE — PATIENT COMMUNICATION
You  Hussain Morales M.D. 5 hours ago (8:28 AM)     Are you ok with him starting Farxiga 10mg? It looks like he may have been on it before too.   Thanks!      Hussain Morales M.D.  You 2 hours ago (12:00 PM)     yes

## 2021-10-14 RX ORDER — FINASTERIDE 5 MG/1
TABLET, FILM COATED ORAL
Qty: 90 TABLET | Refills: 1 | Status: SHIPPED | OUTPATIENT
Start: 2021-10-14 | End: 2022-03-10 | Stop reason: SDUPTHER

## 2021-10-14 NOTE — TELEPHONE ENCOUNTER
Received request via: Pharmacy    Was the patient seen in the last year in this department? Yes    Does the patient have an active prescription (recently filled or refills available) for medication(s) requested? No    Requested Prescriptions     Pending Prescriptions Disp Refills   • finasteride (PROSCAR) 5 MG Tab [Pharmacy Med Name: FINASTERIDE 5 MG TABLET] 90 Tablet 1     Sig: TAKE 1 TABLET BY MOUTH EVERY DAY

## 2021-10-24 ENCOUNTER — HOSPITAL ENCOUNTER (INPATIENT)
Facility: MEDICAL CENTER | Age: 71
LOS: 8 days | DRG: 872 | End: 2021-11-02
Attending: EMERGENCY MEDICINE | Admitting: INTERNAL MEDICINE
Payer: MEDICARE

## 2021-10-24 ENCOUNTER — APPOINTMENT (OUTPATIENT)
Dept: RADIOLOGY | Facility: MEDICAL CENTER | Age: 71
DRG: 872 | End: 2021-10-24
Attending: STUDENT IN AN ORGANIZED HEALTH CARE EDUCATION/TRAINING PROGRAM
Payer: MEDICARE

## 2021-10-24 ENCOUNTER — APPOINTMENT (OUTPATIENT)
Dept: RADIOLOGY | Facility: MEDICAL CENTER | Age: 71
DRG: 872 | End: 2021-10-24
Attending: EMERGENCY MEDICINE
Payer: MEDICARE

## 2021-10-24 DIAGNOSIS — R78.81 BACTEREMIA: ICD-10-CM

## 2021-10-24 DIAGNOSIS — R51.9 CHRONIC DAILY HEADACHE: ICD-10-CM

## 2021-10-24 PROBLEM — I25.5 ISCHEMIC CARDIOMYOPATHY: Status: ACTIVE | Noted: 2021-10-24

## 2021-10-24 LAB
ALBUMIN SERPL BCP-MCNC: 4 G/DL (ref 3.2–4.9)
ALBUMIN/GLOB SERPL: 0.9 G/DL
ALP SERPL-CCNC: 65 U/L (ref 30–99)
ALT SERPL-CCNC: 16 U/L (ref 2–50)
ANION GAP SERPL CALC-SCNC: 13 MMOL/L (ref 7–16)
ANISOCYTOSIS BLD QL SMEAR: ABNORMAL
APPEARANCE UR: CLEAR
AST SERPL-CCNC: 19 U/L (ref 12–45)
BACTERIA #/AREA URNS HPF: ABNORMAL /HPF
BASOPHILS # BLD AUTO: 0.9 % (ref 0–1.8)
BASOPHILS # BLD: 0.08 K/UL (ref 0–0.12)
BILIRUB SERPL-MCNC: 0.7 MG/DL (ref 0.1–1.5)
BILIRUB UR QL STRIP.AUTO: NEGATIVE
BUN SERPL-MCNC: 14 MG/DL (ref 8–22)
CALCIUM SERPL-MCNC: 9.5 MG/DL (ref 8.4–10.2)
CHLORIDE SERPL-SCNC: 102 MMOL/L (ref 96–112)
CO2 SERPL-SCNC: 21 MMOL/L (ref 20–33)
COLOR UR: YELLOW
COMMENT 1642: NORMAL
CREAT SERPL-MCNC: 0.96 MG/DL (ref 0.5–1.4)
EKG IMPRESSION: NORMAL
EOSINOPHIL # BLD AUTO: 0.09 K/UL (ref 0–0.51)
EOSINOPHIL NFR BLD: 1.1 % (ref 0–6.9)
EPI CELLS #/AREA URNS HPF: NEGATIVE /HPF
ERYTHROCYTE [DISTWIDTH] IN BLOOD BY AUTOMATED COUNT: 69.2 FL (ref 35.9–50)
ETHANOL BLD-MCNC: <10.1 MG/DL (ref 0–10)
GLOBULIN SER CALC-MCNC: 4.7 G/DL (ref 1.9–3.5)
GLUCOSE BLD-MCNC: 156 MG/DL (ref 65–99)
GLUCOSE SERPL-MCNC: 156 MG/DL (ref 65–99)
GLUCOSE UR STRIP.AUTO-MCNC: >=1000 MG/DL
HCT VFR BLD AUTO: 50 % (ref 42–52)
HGB BLD-MCNC: 16.4 G/DL (ref 14–18)
IMM GRANULOCYTES # BLD AUTO: 0.05 K/UL (ref 0–0.11)
IMM GRANULOCYTES NFR BLD AUTO: 0.6 % (ref 0–0.9)
KETONES UR STRIP.AUTO-MCNC: ABNORMAL MG/DL
LACTATE BLD-SCNC: 1.6 MMOL/L (ref 0.5–2)
LEUKOCYTE ESTERASE UR QL STRIP.AUTO: NEGATIVE
LIPASE SERPL-CCNC: 30 U/L (ref 7–58)
LYMPHOCYTES # BLD AUTO: 0.41 K/UL (ref 1–4.8)
LYMPHOCYTES NFR BLD: 4.9 % (ref 22–41)
MACROCYTES BLD QL SMEAR: ABNORMAL
MCH RBC QN AUTO: 36 PG (ref 27–33)
MCHC RBC AUTO-ENTMCNC: 32.8 G/DL (ref 33.7–35.3)
MCV RBC AUTO: 109.9 FL (ref 81.4–97.8)
MICRO URNS: ABNORMAL
MONOCYTES # BLD AUTO: 0.56 K/UL (ref 0–0.85)
MONOCYTES NFR BLD AUTO: 6.6 % (ref 0–13.4)
MUCOUS THREADS #/AREA URNS HPF: ABNORMAL /HPF
NEUTROPHILS # BLD AUTO: 7.26 K/UL (ref 1.82–7.42)
NEUTROPHILS NFR BLD: 85.9 % (ref 44–72)
NITRITE UR QL STRIP.AUTO: NEGATIVE
NRBC # BLD AUTO: 0 K/UL
NRBC BLD-RTO: 0 /100 WBC
PH UR STRIP.AUTO: 5.5 [PH] (ref 5–8)
PLATELET # BLD AUTO: 189 K/UL (ref 164–446)
PLATELET BLD QL SMEAR: NORMAL
PMV BLD AUTO: 10.9 FL (ref 9–12.9)
POTASSIUM SERPL-SCNC: 3.8 MMOL/L (ref 3.6–5.5)
PROT SERPL-MCNC: 8.7 G/DL (ref 6–8.2)
PROT UR QL STRIP: NEGATIVE MG/DL
RBC # BLD AUTO: 4.55 M/UL (ref 4.7–6.1)
RBC # URNS HPF: ABNORMAL /HPF
RBC BLD AUTO: PRESENT
RBC UR QL AUTO: ABNORMAL
SODIUM SERPL-SCNC: 136 MMOL/L (ref 135–145)
SP GR UR STRIP.AUTO: 1.01
TROPONIN T SERPL-MCNC: 15 NG/L (ref 6–19)
WBC # BLD AUTO: 8.5 K/UL (ref 4.8–10.8)
WBC #/AREA URNS HPF: ABNORMAL /HPF

## 2021-10-24 PROCEDURE — 71045 X-RAY EXAM CHEST 1 VIEW: CPT

## 2021-10-24 PROCEDURE — 96375 TX/PRO/DX INJ NEW DRUG ADDON: CPT

## 2021-10-24 PROCEDURE — G0378 HOSPITAL OBSERVATION PER HR: HCPCS

## 2021-10-24 PROCEDURE — 84484 ASSAY OF TROPONIN QUANT: CPT

## 2021-10-24 PROCEDURE — 82077 ASSAY SPEC XCP UR&BREATH IA: CPT

## 2021-10-24 PROCEDURE — 96376 TX/PRO/DX INJ SAME DRUG ADON: CPT

## 2021-10-24 PROCEDURE — 99220 PR INITIAL OBSERVATION CARE,LEVL III: CPT | Performed by: STUDENT IN AN ORGANIZED HEALTH CARE EDUCATION/TRAINING PROGRAM

## 2021-10-24 PROCEDURE — 87150 DNA/RNA AMPLIFIED PROBE: CPT

## 2021-10-24 PROCEDURE — 80053 COMPREHEN METABOLIC PANEL: CPT

## 2021-10-24 PROCEDURE — 82962 GLUCOSE BLOOD TEST: CPT

## 2021-10-24 PROCEDURE — 70498 CT ANGIOGRAPHY NECK: CPT | Mod: MG

## 2021-10-24 PROCEDURE — 99285 EMERGENCY DEPT VISIT HI MDM: CPT

## 2021-10-24 PROCEDURE — 700117 HCHG RX CONTRAST REV CODE 255: Performed by: STUDENT IN AN ORGANIZED HEALTH CARE EDUCATION/TRAINING PROGRAM

## 2021-10-24 PROCEDURE — 83605 ASSAY OF LACTIC ACID: CPT

## 2021-10-24 PROCEDURE — 85025 COMPLETE CBC W/AUTO DIFF WBC: CPT

## 2021-10-24 PROCEDURE — 700111 HCHG RX REV CODE 636 W/ 250 OVERRIDE (IP)

## 2021-10-24 PROCEDURE — 93005 ELECTROCARDIOGRAM TRACING: CPT | Performed by: EMERGENCY MEDICINE

## 2021-10-24 PROCEDURE — 700111 HCHG RX REV CODE 636 W/ 250 OVERRIDE (IP): Performed by: INTERNAL MEDICINE

## 2021-10-24 PROCEDURE — 87040 BLOOD CULTURE FOR BACTERIA: CPT | Mod: 91

## 2021-10-24 PROCEDURE — 70496 CT ANGIOGRAPHY HEAD: CPT | Mod: MG

## 2021-10-24 PROCEDURE — 81001 URINALYSIS AUTO W/SCOPE: CPT

## 2021-10-24 PROCEDURE — 700105 HCHG RX REV CODE 258

## 2021-10-24 PROCEDURE — 96374 THER/PROPH/DIAG INJ IV PUSH: CPT

## 2021-10-24 PROCEDURE — 87186 SC STD MICRODIL/AGAR DIL: CPT

## 2021-10-24 PROCEDURE — 83690 ASSAY OF LIPASE: CPT

## 2021-10-24 PROCEDURE — 87077 CULTURE AEROBIC IDENTIFY: CPT

## 2021-10-24 PROCEDURE — 93005 ELECTROCARDIOGRAM TRACING: CPT

## 2021-10-24 RX ORDER — POLYETHYLENE GLYCOL 3350 17 G/17G
1 POWDER, FOR SOLUTION ORAL
Status: DISCONTINUED | OUTPATIENT
Start: 2021-10-24 | End: 2021-11-02 | Stop reason: HOSPADM

## 2021-10-24 RX ORDER — METOPROLOL SUCCINATE 25 MG/1
25 TABLET, EXTENDED RELEASE ORAL DAILY
Status: DISCONTINUED | OUTPATIENT
Start: 2021-10-25 | End: 2021-11-02 | Stop reason: HOSPADM

## 2021-10-24 RX ORDER — LORAZEPAM 2 MG/ML
0.5 INJECTION INTRAMUSCULAR EVERY 4 HOURS PRN
Status: DISCONTINUED | OUTPATIENT
Start: 2021-10-24 | End: 2021-11-02 | Stop reason: HOSPADM

## 2021-10-24 RX ORDER — SODIUM CHLORIDE 9 MG/ML
1000 INJECTION, SOLUTION INTRAVENOUS ONCE
Status: DISCONTINUED | OUTPATIENT
Start: 2021-10-24 | End: 2021-10-24

## 2021-10-24 RX ORDER — FINASTERIDE 5 MG/1
5 TABLET, FILM COATED ORAL DAILY
Status: DISCONTINUED | OUTPATIENT
Start: 2021-10-25 | End: 2021-11-02 | Stop reason: HOSPADM

## 2021-10-24 RX ORDER — DIGOXIN 125 MCG
125 TABLET ORAL DAILY
Status: DISCONTINUED | OUTPATIENT
Start: 2021-10-25 | End: 2021-11-02 | Stop reason: HOSPADM

## 2021-10-24 RX ORDER — SODIUM CHLORIDE 9 MG/ML
250 INJECTION, SOLUTION INTRAVENOUS ONCE
Status: COMPLETED | OUTPATIENT
Start: 2021-10-24 | End: 2021-10-24

## 2021-10-24 RX ORDER — BISACODYL 10 MG
10 SUPPOSITORY, RECTAL RECTAL
Status: DISCONTINUED | OUTPATIENT
Start: 2021-10-24 | End: 2021-11-02 | Stop reason: HOSPADM

## 2021-10-24 RX ORDER — MECLIZINE HYDROCHLORIDE 25 MG/1
25 TABLET ORAL 3 TIMES DAILY PRN
Status: DISCONTINUED | OUTPATIENT
Start: 2021-10-24 | End: 2021-11-02 | Stop reason: HOSPADM

## 2021-10-24 RX ORDER — ONDANSETRON 2 MG/ML
4 INJECTION INTRAMUSCULAR; INTRAVENOUS ONCE
Status: COMPLETED | OUTPATIENT
Start: 2021-10-24 | End: 2021-10-24

## 2021-10-24 RX ORDER — CHOLECALCIFEROL (VITAMIN D3) 125 MCG
5 CAPSULE ORAL NIGHTLY
Status: DISCONTINUED | OUTPATIENT
Start: 2021-10-24 | End: 2021-11-02 | Stop reason: HOSPADM

## 2021-10-24 RX ORDER — AMOXICILLIN 250 MG
2 CAPSULE ORAL 2 TIMES DAILY
Status: DISCONTINUED | OUTPATIENT
Start: 2021-10-24 | End: 2021-11-02 | Stop reason: HOSPADM

## 2021-10-24 RX ORDER — ONDANSETRON 2 MG/ML
4 INJECTION INTRAMUSCULAR; INTRAVENOUS EVERY 4 HOURS PRN
Status: DISCONTINUED | OUTPATIENT
Start: 2021-10-24 | End: 2021-11-02 | Stop reason: HOSPADM

## 2021-10-24 RX ORDER — ENALAPRILAT 1.25 MG/ML
1.25 INJECTION INTRAVENOUS EVERY 6 HOURS PRN
Status: DISCONTINUED | OUTPATIENT
Start: 2021-10-24 | End: 2021-11-02 | Stop reason: HOSPADM

## 2021-10-24 RX ORDER — DEXTROSE MONOHYDRATE 25 G/50ML
50 INJECTION, SOLUTION INTRAVENOUS
Status: DISCONTINUED | OUTPATIENT
Start: 2021-10-24 | End: 2021-11-02 | Stop reason: HOSPADM

## 2021-10-24 RX ORDER — LABETALOL HYDROCHLORIDE 5 MG/ML
10 INJECTION, SOLUTION INTRAVENOUS EVERY 4 HOURS PRN
Status: DISCONTINUED | OUTPATIENT
Start: 2021-10-24 | End: 2021-11-02 | Stop reason: HOSPADM

## 2021-10-24 RX ADMIN — SODIUM CHLORIDE 250 ML: 9 INJECTION, SOLUTION INTRAVENOUS at 19:23

## 2021-10-24 RX ADMIN — LORAZEPAM 0.5 MG: 2 INJECTION INTRAMUSCULAR; INTRAVENOUS at 22:54

## 2021-10-24 RX ADMIN — ONDANSETRON 4 MG: 2 INJECTION INTRAMUSCULAR; INTRAVENOUS at 19:23

## 2021-10-24 RX ADMIN — IOHEXOL 80 ML: 350 INJECTION, SOLUTION INTRAVENOUS at 21:58

## 2021-10-24 RX ADMIN — ONDANSETRON 4 MG: 2 INJECTION INTRAMUSCULAR; INTRAVENOUS at 17:57

## 2021-10-24 ASSESSMENT — CHA2DS2 SCORE
HYPERTENSION: YES
SEX: MALE
CHA2DS2 VASC SCORE: 5
AGE 75 OR GREATER: NO
AGE 65 TO 74: YES
VASCULAR DISEASE: YES
PRIOR STROKE OR TIA OR THROMBOEMBOLISM: NO
CHF OR LEFT VENTRICULAR DYSFUNCTION: YES
DIABETES: YES

## 2021-10-24 ASSESSMENT — ENCOUNTER SYMPTOMS
FEVER: 0
VOMITING: 0
COUGH: 0
MYALGIAS: 0
HEADACHES: 0
CHILLS: 0
DIARRHEA: 0
SORE THROAT: 0
PALPITATIONS: 1
DIZZINESS: 1
ABDOMINAL PAIN: 1
NAUSEA: 1
SHORTNESS OF BREATH: 0

## 2021-10-24 ASSESSMENT — PAIN DESCRIPTION - PAIN TYPE: TYPE: ACUTE PAIN

## 2021-10-24 ASSESSMENT — FIBROSIS 4 INDEX
FIB4 SCORE: 1.73
FIB4 SCORE: 1.79

## 2021-10-25 PROBLEM — R78.81 BACTEREMIA: Status: ACTIVE | Noted: 2021-10-25

## 2021-10-25 LAB
ANION GAP SERPL CALC-SCNC: 14 MMOL/L (ref 7–16)
BUN SERPL-MCNC: 11 MG/DL (ref 8–22)
CALCIUM SERPL-MCNC: 9.3 MG/DL (ref 8.4–10.2)
CHLORIDE SERPL-SCNC: 101 MMOL/L (ref 96–112)
CO2 SERPL-SCNC: 20 MMOL/L (ref 20–33)
CREAT SERPL-MCNC: 0.9 MG/DL (ref 0.5–1.4)
DIGOXIN SERPL-MCNC: 0.4 NG/ML (ref 0.8–2)
GLUCOSE BLD-MCNC: 148 MG/DL (ref 65–99)
GLUCOSE BLD-MCNC: 156 MG/DL (ref 65–99)
GLUCOSE BLD-MCNC: 159 MG/DL (ref 65–99)
GLUCOSE SERPL-MCNC: 153 MG/DL (ref 65–99)
POTASSIUM SERPL-SCNC: 4 MMOL/L (ref 3.6–5.5)
SODIUM SERPL-SCNC: 135 MMOL/L (ref 135–145)

## 2021-10-25 PROCEDURE — 99233 SBSQ HOSP IP/OBS HIGH 50: CPT | Performed by: INTERNAL MEDICINE

## 2021-10-25 PROCEDURE — 700111 HCHG RX REV CODE 636 W/ 250 OVERRIDE (IP): Performed by: STUDENT IN AN ORGANIZED HEALTH CARE EDUCATION/TRAINING PROGRAM

## 2021-10-25 PROCEDURE — 700111 HCHG RX REV CODE 636 W/ 250 OVERRIDE (IP): Performed by: INTERNAL MEDICINE

## 2021-10-25 PROCEDURE — 80048 BASIC METABOLIC PNL TOTAL CA: CPT

## 2021-10-25 PROCEDURE — 96367 TX/PROPH/DG ADDL SEQ IV INF: CPT

## 2021-10-25 PROCEDURE — 700102 HCHG RX REV CODE 250 W/ 637 OVERRIDE(OP): Performed by: STUDENT IN AN ORGANIZED HEALTH CARE EDUCATION/TRAINING PROGRAM

## 2021-10-25 PROCEDURE — 82962 GLUCOSE BLOOD TEST: CPT | Mod: 91

## 2021-10-25 PROCEDURE — 770006 HCHG ROOM/CARE - MED/SURG/GYN SEMI*

## 2021-10-25 PROCEDURE — 96365 THER/PROPH/DIAG IV INF INIT: CPT

## 2021-10-25 PROCEDURE — 700105 HCHG RX REV CODE 258: Performed by: INTERNAL MEDICINE

## 2021-10-25 PROCEDURE — 700102 HCHG RX REV CODE 250 W/ 637 OVERRIDE(OP): Performed by: INTERNAL MEDICINE

## 2021-10-25 PROCEDURE — A9270 NON-COVERED ITEM OR SERVICE: HCPCS | Performed by: INTERNAL MEDICINE

## 2021-10-25 PROCEDURE — 96372 THER/PROPH/DIAG INJ SC/IM: CPT

## 2021-10-25 PROCEDURE — 92610 EVALUATE SWALLOWING FUNCTION: CPT

## 2021-10-25 PROCEDURE — 96375 TX/PRO/DX INJ NEW DRUG ADDON: CPT

## 2021-10-25 PROCEDURE — A9270 NON-COVERED ITEM OR SERVICE: HCPCS | Performed by: STUDENT IN AN ORGANIZED HEALTH CARE EDUCATION/TRAINING PROGRAM

## 2021-10-25 PROCEDURE — 99223 1ST HOSP IP/OBS HIGH 75: CPT | Performed by: INTERNAL MEDICINE

## 2021-10-25 PROCEDURE — 96376 TX/PRO/DX INJ SAME DRUG ADON: CPT

## 2021-10-25 PROCEDURE — 700105 HCHG RX REV CODE 258: Performed by: STUDENT IN AN ORGANIZED HEALTH CARE EDUCATION/TRAINING PROGRAM

## 2021-10-25 PROCEDURE — 36415 COLL VENOUS BLD VENIPUNCTURE: CPT

## 2021-10-25 PROCEDURE — 80162 ASSAY OF DIGOXIN TOTAL: CPT

## 2021-10-25 RX ORDER — SODIUM CHLORIDE 9 MG/ML
INJECTION, SOLUTION INTRAVENOUS CONTINUOUS
Status: DISCONTINUED | OUTPATIENT
Start: 2021-10-25 | End: 2021-10-31

## 2021-10-25 RX ORDER — CEFAZOLIN SODIUM IN 0.9 % NACL 2 G/100 ML
2 PLASTIC BAG, INJECTION (ML) INTRAVENOUS EVERY 8 HOURS
Status: DISCONTINUED | OUTPATIENT
Start: 2021-10-25 | End: 2021-11-02 | Stop reason: HOSPADM

## 2021-10-25 RX ORDER — ACETAMINOPHEN 325 MG/1
650 TABLET ORAL EVERY 4 HOURS PRN
Status: DISCONTINUED | OUTPATIENT
Start: 2021-10-25 | End: 2021-11-02 | Stop reason: HOSPADM

## 2021-10-25 RX ADMIN — Medication 5 MG: at 20:40

## 2021-10-25 RX ADMIN — CEFAZOLIN 2 G: 1 INJECTION, POWDER, FOR SOLUTION INTRAVENOUS at 21:02

## 2021-10-25 RX ADMIN — SODIUM CHLORIDE: 9 INJECTION, SOLUTION INTRAVENOUS at 19:00

## 2021-10-25 RX ADMIN — ONDANSETRON 4 MG: 2 INJECTION INTRAMUSCULAR; INTRAVENOUS at 04:09

## 2021-10-25 RX ADMIN — INSULIN GLARGINE 6 UNITS: 100 INJECTION, SOLUTION SUBCUTANEOUS at 06:34

## 2021-10-25 RX ADMIN — MECLIZINE HYDROCHLORIDE 25 MG: 25 TABLET ORAL at 06:37

## 2021-10-25 RX ADMIN — VANCOMYCIN HYDROCHLORIDE 2250 MG: 500 INJECTION, POWDER, LYOPHILIZED, FOR SOLUTION INTRAVENOUS at 11:40

## 2021-10-25 RX ADMIN — ONDANSETRON 4 MG: 2 INJECTION INTRAMUSCULAR; INTRAVENOUS at 08:25

## 2021-10-25 RX ADMIN — CEFAZOLIN 2 G: 1 INJECTION, POWDER, FOR SOLUTION INTRAVENOUS at 14:00

## 2021-10-25 RX ADMIN — DIGOXIN 125 MCG: 125 TABLET ORAL at 20:40

## 2021-10-25 RX ADMIN — INSULIN HUMAN 2 UNITS: 100 INJECTION, SOLUTION PARENTERAL at 06:34

## 2021-10-25 RX ADMIN — LORAZEPAM 0.5 MG: 2 INJECTION INTRAMUSCULAR; INTRAVENOUS at 11:43

## 2021-10-25 ASSESSMENT — COGNITIVE AND FUNCTIONAL STATUS - GENERAL
DRESSING REGULAR UPPER BODY CLOTHING: A LOT
HELP NEEDED FOR BATHING: TOTAL
TOILETING: TOTAL
STANDING UP FROM CHAIR USING ARMS: TOTAL
EATING MEALS: A LITTLE
PERSONAL GROOMING: A LOT
DRESSING REGULAR LOWER BODY CLOTHING: TOTAL
MOBILITY SCORE: 9
MOVING TO AND FROM BED TO CHAIR: A LOT
MOVING FROM LYING ON BACK TO SITTING ON SIDE OF FLAT BED: A LOT
SUGGESTED CMS G CODE MODIFIER MOBILITY: CM
DAILY ACTIVITIY SCORE: 10
WALKING IN HOSPITAL ROOM: TOTAL
SUGGESTED CMS G CODE MODIFIER DAILY ACTIVITY: CL
CLIMB 3 TO 5 STEPS WITH RAILING: TOTAL
TURNING FROM BACK TO SIDE WHILE IN FLAT BAD: A LOT

## 2021-10-25 ASSESSMENT — ENCOUNTER SYMPTOMS
ORTHOPNEA: 0
SORE THROAT: 0
HEMOPTYSIS: 0
ABDOMINAL PAIN: 0
CLAUDICATION: 0
VOMITING: 0
SHORTNESS OF BREATH: 0
FLANK PAIN: 0
CHILLS: 0
SENSORY CHANGE: 0
PALPITATIONS: 0
SPEECH CHANGE: 0
MYALGIAS: 0
COUGH: 0
DIZZINESS: 1
BRUISES/BLEEDS EASILY: 0
PHOTOPHOBIA: 0
BLOOD IN STOOL: 0
WEAKNESS: 1
DIARRHEA: 0
FEVER: 0
DEPRESSION: 0
HEARTBURN: 0
BLURRED VISION: 0
EYE DISCHARGE: 0
HEADACHES: 0
NAUSEA: 0
DOUBLE VISION: 0
BACK PAIN: 0
SPUTUM PRODUCTION: 0
WHEEZING: 0

## 2021-10-25 ASSESSMENT — PAIN DESCRIPTION - PAIN TYPE
TYPE: ACUTE PAIN
TYPE: ACUTE PAIN

## 2021-10-25 NOTE — ASSESSMENT & PLAN NOTE
Continue aspirin. Patient not on statin due to history of statin intolerance  Goal directed medical therapy as tolerated  No active chest pain at this time

## 2021-10-25 NOTE — H&P
Hospital Medicine History & Physical Note    Date of Service  10/24/2021    Primary Care Physician  CONNER Harman.    Code Status  Full Code    Chief Complaint  Chief Complaint   Patient presents with   • Chest Pain     on and off for last few weeks   • Shortness of Breath     since he had covid in january but worsened today    • Weakness     worsened today    • Dizziness     started today       History of Presenting Illness  Francesco Schulte is a 71 y.o. male who presented 10/24/2021 with dizziness, palpitations. PMH of insulin-dependent diabetes, HFrEF s/p AICD, lipidemia, atrial fibrillation on apixaban, BPH, CVA.   Patient states last year he was started on the dapagliflozin but it was discontinued after 2 weeks because he did not tolerate it. Symptoms at that time included dizziness, palpitations, abdominal pain, nausea, generalized weakness. About 2-3 weeks ago he and his physician decided to try it again, says he is having the same exact symptoms as he was last time. Says he does not have any britta chest pain he just feels palpitations. His AICD was interrogated in the ED, no arrhythmias or discharge.     His dizziness is described as room spinning and occurs whenever he moves his head from side to side. It also occurs when transferring from laying to seated position or when standing.    Addendum:  Radiology reported to ED that pt has a R PCA occlusion. Neurology, Dr Singh, was contacted and recommended STAT MRI head, the soonest that could be done at [facility] is in the morning per [facility], relayed that info to neuro who's ok with that. Unknown if new or old since pt has a known hx of R occipital stroke.    Per pt, his vertigo symptoms have been going on for a few days at least now, not acute. Neuro will determine further interventions after MRI head tomorrow.     I discussed the plan of care with patient.    Review of Systems  Review of Systems   Constitutional: Negative for chills and  fever.   HENT: Negative for sore throat.    Respiratory: Negative for cough and shortness of breath.    Cardiovascular: Positive for palpitations. Negative for chest pain and leg swelling.   Gastrointestinal: Positive for abdominal pain and nausea. Negative for diarrhea and vomiting.   Genitourinary: Negative for dysuria, frequency and urgency.   Musculoskeletal: Negative for myalgias.   Neurological: Positive for dizziness. Negative for headaches.   All other systems reviewed and are negative.      Past Medical History   has a past medical history of Agent orange exposure, Anesthesia, Cancer ([facility]), Diabetes ([facility]), Family history of punctured lung (2002), Heart attack ([facility]) (03/2017), Hemorrhagic disorder ([facility]), High cholesterol, Ischemic cardiomyopathy, Kidney stones, Orthostatic hypotension (3/29/2018), Pain, Polycythemia vera(238.4), Stroke ([facility]) (2016), Urinary bladder disorder, and Vertigo.    Surgical History   has a past surgical history that includes other cardiac surgery; cath placement; laminotomy; appendectomy; carotid endarterectomy (Right, 3/21/2018); temporal artery biopsy (Right, 6/26/2018); pr incis/drain scrotum/testis,epididym (Right, 7/25/2020); pr explore scrotum (Right, 7/29/2020); pr explore scrotum (Right, 7/31/2020); skin abscess incision and drainage (Right, 8/3/2020); split thickness skin graft (N/A, 8/23/2020); and aicd implant (07/29/2021).     Family History  family history is not on file.   Family history reviewed with patient. There is no family history that is pertinent to the chief complaint.     Social History   reports that he has never smoked. He has never used smokeless tobacco. He reports that he does not drink alcohol and does not use drugs.    Allergies  Allergies   Allergen Reactions   • Doxycycline      Swelling lips and difficulties swallowing   • Atorvastatin    • Beta Adrenergic Blockers Unspecified     dizziness   • Metformin Unspecified and Palpitations     Cardiac  "effects  \"Similar to a heart attack, I was hospitalized\"   • Simvastatin      Other reaction(s): Other (Specify with Comments)  Myalgias  Myalgias   • Statins [Hmg-Coa-R Inhibitors]      Muscle ache, joint pain       Medications  Prior to Admission Medications   Prescriptions Last Dose Informant Patient Reported? Taking?   Blood Glucose Monitoring Suppl (ONETOUCH VERIO) w/Device Kit   No No   Si Device 3 times a day before meals.   Blood Glucose Test Strips   No No   Sig: verio test strips for glucose monitoring TID and PRN   Insulin Degludec (TRESIBA FLEXTOUCH) 200 UNIT/ML Solution Pen-injector 10/24/2021 at am Patient No No   Sig: Inject 12 Units under the skin every evening.   Patient taking differently: Inject 6 Units under the skin every morning.   apixaban (ELIQUIS) 5mg Tab 10/24/2021 at am  No No   Sig: Take 1 Tablet by mouth 2 times a day.   aspirin EC (ECOTRIN) 81 MG Tablet Delayed Response 10/24/2021 at am Patient Yes No   Sig: Take 1 Tab by mouth every day.   bumetanide (BUMEX) 0.5 MG Tab not taking at not taking   No No   Sig: Take 1 tablet by mouth every day.   dicyclomine (BENTYL) 10 MG Cap 10/24/2021 at am  Yes No   Sig: Take 10 mg by mouth every day.   digoxin (LANOXIN) 125 MCG Tab 10/24/2021 at am Patient No No   Sig: Take 1 tablet by mouth every day at 6 PM.   docusate sodium (COLACE) 100 MG Cap 10/24/2021 at am Patient No No   Sig: Take 1 capsule by mouth 2 times a day as needed for Constipation.   finasteride (PROSCAR) 5 MG Tab 10/24/2021 at am  No No   Sig: TAKE 1 TABLET BY MOUTH EVERY DAY   hydroxyurea (HYDREA) 500 MG Cap 10/24/2021 at am Patient Yes No   Sig: Take 500 mg by mouth 2 Times a Day.   metoprolol SR (TOPROL XL) 25 MG TABLET SR 24 HR 10/24/2021 at am Patient No No   Sig: Take 1 tablet by mouth every evening.   nitroglycerin (NITROSTAT) 0.4 MG SL Tab prn at prn  No No   Sig: Place 1 Tablet under the tongue as needed for Chest Pain (or hypertension >180/110).   therapeutic " multivitamin-minerals (THERAGRAN-M) Tab 10/24/2021 at am Patient Yes No   Sig: Take 1 Tab by mouth every day.      Facility-Administered Medications: None       Physical Exam  Temp:  [36.7 °C (98.1 °F)-37.1 °C (98.7 °F)] 37.1 °C (98.7 °F)  Pulse:  [70-90] 88  Resp:  [12-25] 16  BP: (145-176)/() 176/83  SpO2:  [92 %-97 %] 93 %  Blood Pressure : 150/75   Temperature: 36.7 °C (98.1 °F)   Pulse: 77   Respiration: 12   Pulse Oximetry: 95 %       Physical Exam  Constitutional:       Appearance: Normal appearance.   HENT:      Head: Normocephalic and atraumatic.      Mouth/Throat:      Mouth: Mucous membranes are dry.      Pharynx: No oropharyngeal exudate or posterior oropharyngeal erythema.   Eyes:      General: No scleral icterus.     Extraocular Movements: Extraocular movements intact.      Pupils: Pupils are equal, round, and reactive to light.      Comments: Horizontal nystagmus bilaterally    Cardiovascular:      Rate and Rhythm: Normal rate and regular rhythm.      Heart sounds: Normal heart sounds. No murmur heard.     Pulmonary:      Effort: Pulmonary effort is normal. No respiratory distress.      Breath sounds: Normal breath sounds.   Abdominal:      General: There is no distension.      Palpations: Abdomen is soft.      Tenderness: There is no abdominal tenderness.   Neurological:      General: No focal deficit present.      Mental Status: He is alert and oriented to person, place, and time.      Cranial Nerves: No cranial nerve deficit.      Sensory: No sensory deficit.      Motor: No weakness.   Psychiatric:         Mood and Affect: Mood normal.         Laboratory:  Recent Labs     10/24/21  1719   WBC 8.5   RBC 4.55*   HEMOGLOBIN 16.4   HEMATOCRIT 50.0   .9*   MCH 36.0*   MCHC 32.8*   RDW 69.2*   PLATELETCT 189   MPV 10.9     Recent Labs     10/24/21  1719   SODIUM 136   POTASSIUM 3.8   CHLORIDE 102   CO2 21   GLUCOSE 156*   BUN 14   CREATININE 0.96   CALCIUM 9.5     Recent Labs      10/24/21  1719 10/24/21  1845   ALTSGPT 16  --    ASTSGOT 19  --    ALKPHOSPHAT 65  --    TBILIRUBIN 0.7  --    LIPASE  --  30   GLUCOSE 156*  --          No results for input(s): NTPROBNP in the last 72 hours.      Recent Labs     10/24/21  1719   TROPONINT 15       Imaging:  DX-CHEST-PORTABLE (1 VIEW)   Final Result      No acute cardiac or pulmonary abnormality is noted.      CT-CTA HEAD WITH & W/O-POST PROCESS    (Results Pending)   CT-CTA NECK WITH & W/O-POST PROCESSING    (Results Pending)   MR-BRAIN-W/O    (Results Pending)       X-Ray:  I have personally reviewed the images and compared with prior images.  EKG:  I have personally reviewed the images and compared with prior images.    Assessment/Plan:  I anticipate this patient is appropriate for observation status at this time.    * Vertigo- (present on admission)  Assessment & Plan  -pt states he has a hx of vertigo which had resolved, complaining of vertigo now, dizziness like room spinning with rapid movement of head to one side  -pt with hx of CVA and CAD. Bilateral nystagmus on exam concerning for central cause   -Annette-Hallpike Maneuver for further eval not done since pt too nauseous   -meclizine and zofran for symptom control  -CT head and neck to rule out posterior stroke  -consider MRI and neuro eval inpt vs outpt    Ischemic cardiomyopathy- (present on admission)  Assessment & Plan  -Per cardiology notes. Status post AICD 09/2021  -Last echo 05/2021 with a EF of 15-20%  -Continue metoprolol and digoxin. Not on ACEI/ARB due to intolerance per cardiology notes  -Patient is to be on spironolactone per cardiology note 09/2021 however says is not taking it  -Med rec fluids bumetanide, patient states he is not on any diuretic    Orthostatic hypotension- (present on admission)  Assessment & Plan  -Reported history of orthostatic hypotension per notes, currently complaining of dizziness upon change of position consistent with orthostatic hypotension  -check  orthostatic vitals   -Would be difficult to keep patient hydrated due to history of HFrEF    Essential hypertension- (present on admission)  Assessment & Plan  -Continue metoprolol    History of stroke- old right parietal/occipital- (present on admission)  Assessment & Plan  Continue aspirin. Patient not on statin due to history of statin intolerance    CAD (coronary artery disease)- (present on admission)  Assessment & Plan  Continue aspirin. Patient not on statin due to history of statin intolerance    Type 2 diabetes mellitus without complication, with long-term current use of insulin (HCC)- (present on admission)  Assessment & Plan  -Continue long-acting insulin  -Insulin sliding scale and hypoglycemia protocol while in hospital    Paroxysmal A-fib (HCC)- (present on admission)  Assessment & Plan  -Rate controlled, not in RVR  -Continue metoprolol, digoxin and apixaban    Benign prostatic hyperplasia without lower urinary tract symptoms- (present on admission)  Assessment & Plan  Continue finasteride      VTE prophylaxis: therapeutic anticoagulation with apixaban

## 2021-10-25 NOTE — ASSESSMENT & PLAN NOTE
-Rate controlled, not in RVR  -Continue metoprolol, digoxin   -resumed apixaban per neurology on 10/27

## 2021-10-25 NOTE — ED NOTES
Gary from [employer] called with AICD interrogation report. Reports no episodes of dysrhythmia, no AICD discharge. Formal report to be faxed.

## 2021-10-25 NOTE — ED NOTES
"Medication reconciliation process completed by interviewing the patient at bedside. The patient states that he was on Farxiga (dapagliflozin) up until yesterday, but that he \"will no longer take that medication because it makes [him] ill\".   No antibiotics in the last 30 days.    Nova, RakanD, BCPS    "

## 2021-10-25 NOTE — ASSESSMENT & PLAN NOTE
-pt states he has a hx of vertigo which had resolved, complaining of vertigo now, dizziness like room spinning with rapid movement of head to one side  -pt with hx of CVA and CAD. Bilateral nystagmus on exam concerning for central cause   -Annette-Hallpike Maneuver for further eval not done since pt too nauseous   -meclizine and zofran for symptom control  -CT head and neck shows right PCA occlusion however unclear if acute or chronic as patient has had previous posterior circulation stroke  MRI brain showed chronic multiple infarctions  Neurology recommended to resume anticoagulation for stroke prevention

## 2021-10-25 NOTE — ASSESSMENT & PLAN NOTE
-Per cardiology notes. Status post AICD 09/2021  -Last echo 05/2021 with a EF of 15-20%  -Continue metoprolol and digoxin. Not on ACEI/ARB due to intolerance per cardiology notes  -Patient is to be on spironolactone per cardiology note 09/2021 however says is not taking it  -Med rec fluids bumetanide, patient states he is not on any diuretic  - no active volume overload at this time

## 2021-10-25 NOTE — CARE PLAN
The patient is Watcher - Medium risk of patient condition declining or worsening    Shift Goals  Clinical Goals: Control dizziness and nausea  Patient Goals: Pt will have less headace    Progress made toward(s) clinical / shift goals:  Pt resting no complains of nausea.MRI check list done. All need met.    Patient is not progressing towards the following goals:      Problem: Knowledge Deficit - Standard  Goal: Patient and family/care givers will demonstrate understanding of plan of care, disease process/condition, diagnostic tests and medications  Outcome: Progressing     Problem: Pain - Standard  Goal: Alleviation of pain or a reduction in pain to the patient’s comfort goal  Outcome: Progressing

## 2021-10-25 NOTE — PROGRESS NOTES
"Pharmacy Vancomycin Kinetics Note for 10/25/2021     71 y.o. male on Vancomycin day # 1     Vancomycin Indication (AUC Dosing):      Provider specified end date: 11/01/21    Active Antibiotics (From admission, onward)    Ordered     Ordering Provider       Mon Oct 25, 2021 10:04 AM    10/25/21 1004  vancomycin (VANCOCIN) 2,250 mg in  mL IVPB  (vancomycin (VANCOCIN) IV (LD + Maintenance))  ONCE         Ramesh Alston M.D.       Mon Oct 25, 2021 10:00 AM    10/25/21 1000  MD Alert...Vancomycin per Pharmacy  PHARMACY TO DOSE        Question:  Indication(s) for vancomycin?  Answer:  Staphylococcus aureus bacteremia    Ramesh Alston M.D.          Dosing Weight: 86.5 kg (190 lb 11.2 oz)      Admission History: Admitted on 10/24/2021 for Dizziness [R42]  Pertinent history: Vancomycin empirically started for blood cultures 2/2 growing possible Staphylococcus sp.    Allergies:     Doxycycline, Atorvastatin, Beta adrenergic blockers, Metformin, Simvastatin, and Statins [hmg-coa-r inhibitors]     Pertinent cultures to date:     Results     Procedure Component Value Units Date/Time    BLOOD CULTURE [[id number]]  (Abnormal) Collected: 10/24/21 1840    Order Status: Completed Specimen: Blood from Peripheral Updated: 10/25/21 0814     Significant Indicator POS     Source BLD     Site PERIPHERAL     Culture Result Growth detected by Bactec instrument. 10/25/2021  08:14  Gram Stain: Gram positive cocci: Possible Staphylococcus sp.  Blood culture testing and Gram stain, if indicated, are  performed at [facility], [address] [place] [address].  Positive blood cultures are  sent to [facility], [address] [place] [address], for organism identification and  susceptibility testing.      Narrative:      2 of 2 blood culture x2  Sites order. Per Hospital Policy:  Only change Specimen Src: to \"Line\" if specified by physician  order.  Left AC    BLOOD CULTURE [[id number]]  (Abnormal) " "Collected: 10/24/21 1845    Order Status: Completed Specimen: Blood from Peripheral Updated: 10/25/21 0814     Significant Indicator POS     Source BLD     Site PERIPHERAL     Culture Result Growth detected by Bactec instrument. 10/25/2021  07:57  Gram Stain: Gram positive cocci: Possible Staphylococcus sp.  Blood culture testing and Gram stain, if indicated, are  performed at [facility], [address] [place] [address].  Positive blood cultures are  sent to [facility], [address] [place] [address], for organism identification and  susceptibility testing.      Narrative:      CALL  [facility]  [facility] tel. [phone],  CALLED  [facility] tel. [phone] 10/25/2021, 08:13, RB PERF. RESULTS CALLED TO: RN  [id number]  1 of 2 for Blood Culture x 2 sites order. Per Hospital  Policy: Only change Specimen Src: to \"Line\" if specified by  physician order.  Left AC    URINALYSIS (UA) [[id number]]  (Abnormal) Collected: 10/24/21 1840    Order Status: Completed Specimen: Urine, Clean Catch Updated: 10/24/21 1958     Color Yellow     Character Clear     Specific Gravity 1.015     Ph 5.5     Glucose >=1000 mg/dL      Ketones Trace mg/dL      Protein Negative mg/dL      Bilirubin Negative     Nitrite Negative     Leukocyte Esterase Negative     Occult Blood Moderate     Micro Urine Req Microscopic          Labs:     Estimated Creatinine Clearance: 92.6 mL/min (by C-G formula based on SCr of 0.9 mg/dL).  Recent Labs     10/24/21  1719   WBC 8.5   NEUTSPOLYS 85.90*     Recent Labs     10/24/21  1719 10/25/21  0433   BUN 14 11   CREATININE 0.96 0.90   ALBUMIN 4.0  --        Intake/Output Summary (Last 24 hours) at 10/25/2021 1141  Last data filed at 10/25/2021 0856  Gross per 24 hour   Intake 220 ml   Output 300 ml   Net -80 ml      /66   Pulse 94   Temp 36.8 °C (98.3 °F) (Oral)   Resp 17   Ht 1.905 m (6' 3\")   Wt 86.5 kg (190 lb 11.2 oz)   SpO2 94%  Temp (24hrs), Av.8 °C (98.2 °F), " Min:36.4 °C (97.6 °F), Max:37.1 °C (98.7 °F)      List concerns for Vancomycin clearance:     Age;Receipt of contrast dye    Pharmacokinetics:     AUC kinetics:   Ke (hr ^-1): 0.0808 hr^-1  Half life: 8.58 hr  Clearance: 4.543  Estimated TDD: 2271.5  Estimated Dose: 1003  Estimated interval: 10.6      Trough kinetics:   No results for input(s): VANCOTROUGH, VANCOPEAK, VANCORANDOM in the last 72 hours.    A/P:     -  Vancomycin dose: 1250 mg q12h maintenance doses.  -  Next vancomycin level(s):    -10/27  @ 1500   -10/27 Vt  @ 2330     -  Predicted vancomycin AUC from initial AUC test calculator: 550 mg·hr/L      -  Comments: No current signs of sepsis. Empirically covering for 2/2 blood cultures. Pharmacy not scope to order MRSA PCR for this indication. Will continue to follow cultures for appropriate de-escalation recommendations and dose vancomycin per protocol.    Neptali Cortez, [facility]

## 2021-10-25 NOTE — THERAPY
"Speech Language Pathology   Clinical Swallow Evaluation     Patient Name: Francesco Schulte  AGE:  71 y.o., SEX:  male  Medical Record #: [id number]  Today's Date: 10/25/2021     Precautions  Precautions: Swallow Precautions ( See Comments)    Assessment    70 y/o admitted on 10/24 for chest pain, SOB, weakness, and dizziness. PMH includes diabetes, lipidemia, afib. Not seen by SLP before at [facility].     CTA of head found \"Right proximal P2 occlusion, reconstitutes distally.\"     CTA of neck found \"No high-grade stenosis, occlusion, or aneurysm appreciated.\"     Dx chest found \"No acute cardiac or pulmonary abnormality is noted.\"    Pt seen on this date for a clinical swallow evaluation. Pt A&Ox3 with disorientation to event and agreeable to the evaluation. Speech is dysarthric/slow and word finding deficits appreciated. He c/o dizziness with HOB raised and endorsed that he is not able to move his legs. Generalized weakness appreciated during the oral motor evaluation. No overt s/sx of aspiration appreciated with min trials of ice chips, MTL, thin liquids, and puree, however, pt c/o nausea so further PO discontinued. Delayed swallow trigger lasting up to 20 seconds and he c/o having to \"swallow hard\" to get MTL and apple sauce down. Per RN, pt with overt coughing event when taking medications today. At this time, recommend NPO with swallow re-evaluation on 10/26 as pt c/o of swallowing with min PO trials. Per RN, pt to have MRI later today. Okay for single ice chips to maintain integrity of the swallow and minimize xerostomia. Recommend pertinent medications crushed in apple sauce.    Plan    1) Recommend NPO with re-evaluation on 10/16  2) Okay for single ice chips and pertinent medications crushed in apple sauce    Recommend Speech Therapy 5 times per week until therapy goals are met for the following treatments:  Dysphagia Training, Cognitive-Linguistic Training and Patient / Family / Caregiver " Education.    Discharge Recommendations: (P) Recommend post-acute placement for additional speech therapy services prior to discharge home       Objective       10/25/21 1528   Vitals   O2 (LPM) 2   O2 Delivery Device Silicone Nasal Cannula   Dysphagia Strategies / Recommendations   Strategies / Interventions Recommended (Yes / No) Yes   Compensatory Strategies To Be Assessed   Diet / Liquid Recommendation NPO;Pre-Feeding Trials with SLP Only   Medication Administration  Crush all Medications in Puree  (pertinent meds crushed in apple sauce)

## 2021-10-25 NOTE — ED PROVIDER NOTES
ED Provider Note  CHIEF COMPLAINT  Chief Complaint   Patient presents with   • Chest Pain     on and off for last few weeks   • Shortness of Breath     since he had covid in january but worsened today    • Weakness     worsened today    • Dizziness     started today       HPI  Francesco Schulte is a 71 y.o. male who presents to the ER with complaints of intermittent chest pain for the last few weeks, dizziness and lightheadedness for the last 2 days, as well as extreme weakness and nausea over the last 2 days.  Patient reports that he believes his symptoms are secondary to Farxiga.  He took Farxiga last year and had similar symptoms.  The medicine was discontinued due to side effects.  However, his doctor thought it might be useful to try it again.  He has been on Farxiga for 2 weeks.  He said he started feeling bad when he started the Farxiga 2 weeks ago, but everything  got much worse over the last 2 days.  He said that symptoms feel similar to how he felt when he was on the Farxiga last year.  However, the symptoms are much worse this time.  The chest pain comes and goes.  It starts in the right chest and then moves over to the left chest.  He feels short of breath but he says he has felt short of breath and has had extreme fatigue and dyspnea on exertion since having Covid in January 2021.  He also struggles with agent orange exposure and has trouble breathing from that as well.  He has had nausea but no vomiting.  He says he had abdominal pain for the last 4 weeks or so.  He saw a GI doctor who told him he needed to be worked up for pancreatic cancer.  He has a test scheduled to evaluate for pancreatic cancer sometime in the next few weeks.  Patient has a defibrillator pacemaker.  He says he has ejection fraction of 20%.  He has not had any palpitations.  No leg swelling.  He feels extremely weak.  He says he does not feel like he is thinking straight.  He is been having headaches which have been coming  "and going.  It starts on the right side of his forehead and then moves the left side of his forehead.  Currently his headache is \"mild\" and \"could probably be taking care of by an aspirin.\"  It feels similar to the headache he had last year when he was on Farxiga.  Patient is a poor historian.    REVIEW OF SYSTEMS  See HPI for further details. All other systems are negative.    PAST MEDICAL HISTORY  Past Medical History:   Diagnosis Date   • Agent orange exposure    • Anesthesia     resistant to pain meds   • Cancer (HCC)     polycythemia vera   • Diabetes (HCC)     insulin and oral meds   • Family history of punctured lung 2002    left lung   • Heart attack (HCC) 03/2017   • Hemorrhagic disorder (HCC)     on blood thinners   • High cholesterol    • Ischemic cardiomyopathy    • Kidney stones     hx of kidney stones   • Orthostatic hypotension 3/29/2018   • Pain     headache pain   • Polycythemia vera(238.4)    • Stroke (HCC) 2016    r/t afib; eye issues resolved afterward   • Urinary bladder disorder     enlarged prostate, weak stream, difficulty urinating   • Vertigo        FAMILY HISTORY  No family history on file.    SOCIAL HISTORY  Social History     Socioeconomic History   • Marital status:      Spouse name: Not on file   • Number of children: Not on file   • Years of education: Not on file   • Highest education level: Not on file   Occupational History   • Not on file   Tobacco Use   • Smoking status: Never Smoker   • Smokeless tobacco: Never Used   Vaping Use   • Vaping Use: Never used   Substance and Sexual Activity   • Alcohol use: No   • Drug use: No   • Sexual activity: Not on file   Other Topics Concern   • Not on file   Social History Narrative   • Not on file     Social Determinants of Health     Financial Resource Strain:    • Difficulty of Paying Living Expenses:    Food Insecurity:    • Worried About Running Out of Food in the Last Year:    • Ran Out of Food in the Last Year:  "   Transportation Needs:    • Lack of Transportation (Medical):    • Lack of Transportation (Non-Medical):    Physical Activity:    • Days of Exercise per Week:    • Minutes of Exercise per Session:    Stress:    • Feeling of Stress :    Social Connections:    • Frequency of Communication with Friends and Family:    • Frequency of Social Gatherings with Friends and Family:    • Attends Jewish Services:    • Active Member of Clubs or Organizations:    • Attends Club or Organization Meetings:    • Marital Status:    Intimate Partner Violence:    • Fear of Current or Ex-Partner:    • Emotionally Abused:    • Physically Abused:    • Sexually Abused:        SURGICAL HISTORY  Past Surgical History:   Procedure Laterality Date   • AICD IMPLANT  07/29/2021    [employer] D233 implanted by Dr. Isaac.   • SPLIT THICKNESS SKIN GRAFT N/A 8/23/2020    Procedure: APPLICATION, GRAFT, SKIN, SPLIT-THICKNESS;  Surgeon: Elisa Craig M.D.;  Location: [facility];  Service: Plastics   • SKIN ABSCESS INCISION AND DRAINAGE Right 8/3/2020    Procedure: INCISION AND DRAINAGE - SCROTAL WOUND - WOUND VAC PLACEMENT;  Surgeon: Jaylen Hayes M.D.;  Location: [facility];  Service: Urology   • PB EXPLORE SCROTUM Right 7/31/2020    Procedure: EXPLORATION, SCROTUM - FOR WASH OUT OF SCROTAL WOUND & WOUND VAC PLACEMENT;  Surgeon: Ferny Rosales M.D.;  Location: [facility];  Service: Urology   • PB EXPLORE SCROTUM Right 7/29/2020    Procedure: EXPLORATION, SCROTUM - FOR I & D OF SCROTAL AND  GROIN WOUND;  Surgeon: Donat Viera M.D.;  Location: [facility];  Service: Urology   • PB INCIS/DRAIN SCROTUM/TESTIS,EPIDIDYM Right 7/25/2020    Procedure: INCISION AND DRAINAGE, SCROTUM;  Surgeon: Nick Negro M.D.;  Location: [facility];  Service: Urology   • TEMPORAL ARTERY BIOPSY Right 6/26/2018    Procedure: TEMPORAL ARTERY BIOPSY;  Surgeon: Rajendra Aguilar  "M.D.;  Location: SURGERY SAME DAY [facility];  Service: General   • CAROTID ENDARTERECTOMY Right 3/21/2018    Procedure: CAROTID ENDARTERECTOMY- W/EEG MONITORING;  Surgeon: Benny Morfin M.D.;  Location: SURGERY [facility];  Service: General   • APPENDECTOMY     • CATH PLACEMENT      right arm   • LAMINOTOMY      diskectomy; C4   • OTHER CARDIAC SURGERY      stents x 3       CURRENT MEDICATIONS  Home Medications    **Home medications have not yet been reviewed for this encounter**         ALLERGIES  Allergies   Allergen Reactions   • Doxycycline      Swelling lips and difficulties swallowing   • Atorvastatin    • Beta Adrenergic Blockers Unspecified     dizziness   • Metformin Unspecified and Palpitations     Cardiac effects  \"Similar to a heart attack, I was hospitalized\"   • Simvastatin      Other reaction(s): Other (Specify with Comments)  Myalgias  Myalgias   • Statins [Hmg-Coa-R Inhibitors]      Muscle ache, joint pain       PHYSICAL EXAM  VITAL SIGNS: /95   Pulse 84   Temp 36.7 °C (98.1 °F) (Temporal)   Resp 20   Ht 1.905 m (6' 3\")   Wt 83.9 kg (185 lb)   SpO2 97%   BMI 23.12 kg/m²      Constitutional: Well developed, well nourished; ill-appearing.  Appears tired.  HENT: Normocephalic, atraumatic; Bilateral external ears normal;   Eyes: PERRL, EOMI, Conjunctiva normal. No discharge.   Neck:  Supple, nontender midline; No stridor; No nuchal rigidity.   Lymphatic: No cervical lymphadenopathy noted.   Cardiovascular: Regular rate and rhythm without murmurs, rubs, or gallop.   Thorax & Lungs: No respiratory distress, decreased breath sounds at the bases.  A few scattered crackles upper lung fields.  Nontender chest wall. No crepitus or subcutaneous air pacemaker/defibrillator in the left chest wall..   Abdomen: Soft, nontender, bowel sounds normal. No obvious masses; No pulsatile masses; no rebound, guarding, or peritoneal signs.   Skin: Good color; warm and dry without rash or " petechia.  Back: Nontender, No CVA tenderness.   Extremities: Distal radial, dorsalis pedis, posterior tibial pulses are equal bilaterally; No edema; Nontender calves or saphenous, No cyanosis, No clubbing.   Musculoskeletal: Good range of motion in all major joints. No tenderness to palpation or major deformities noted.   Neurologic: Alert & oriented x 4, clear speech      EKG  Please see my EKG interpretation below:  Sinus rhythm.  Rate of 88.  Increased baseline artifact.  T wave inverted in aVL and lead I.  T wave flattening V4 through V6.  There is no ST elevation or depression.  No change when compared to August 25, 2021.    RADIOLOGY/PROCEDURES  DX-CHEST-PORTABLE (1 VIEW)   Final Result      No acute cardiac or pulmonary abnormality is noted.      CT-CTA HEAD WITH & W/O-POST PROCESS    (Results Pending)   CT-CTA NECK WITH & W/O-POST PROCESSING    (Results Pending)       COURSE & MEDICAL DECISION MAKING  Pertinent Labs & Imaging studies reviewed. (See chart for details)  Results for orders placed or performed during the hospital encounter of 10/24/21   CBC with Differential   Result Value Ref Range    WBC 8.5 4.8 - 10.8 K/uL    RBC 4.55 (L) 4.70 - 6.10 M/uL    Hemoglobin 16.4 14.0 - 18.0 g/dL    Hematocrit 50.0 42.0 - 52.0 %    .9 (H) 81.4 - 97.8 fL    MCH 36.0 (H) 27.0 - 33.0 pg    MCHC 32.8 (L) 33.7 - 35.3 g/dL    RDW 69.2 (H) 35.9 - 50.0 fL    Platelet Count 189 164 - 446 K/uL    MPV 10.9 9.0 - 12.9 fL    Neutrophils-Polys 85.90 (H) 44.00 - 72.00 %    Lymphocytes 4.90 (L) 22.00 - 41.00 %    Monocytes 6.60 0.00 - 13.40 %    Eosinophils 1.10 0.00 - 6.90 %    Basophils 0.90 0.00 - 1.80 %    Immature Granulocytes 0.60 0.00 - 0.90 %    Nucleated RBC 0.00 /100 WBC    Neutrophils (Absolute) 7.26 1.82 - 7.42 K/uL    Lymphs (Absolute) 0.41 (L) 1.00 - 4.80 K/uL    Monos (Absolute) 0.56 0.00 - 0.85 K/uL    Eos (Absolute) 0.09 0.00 - 0.51 K/uL    Baso (Absolute) 0.08 0.00 - 0.12 K/uL    Immature Granulocytes  (abs) 0.05 0.00 - 0.11 K/uL    NRBC (Absolute) 0.00 K/uL    Anisocytosis 2+ (A)     Macrocytosis 3+ (A)    Complete Metabolic Panel (CMP)   Result Value Ref Range    Sodium 136 135 - 145 mmol/L    Potassium 3.8 3.6 - 5.5 mmol/L    Chloride 102 96 - 112 mmol/L    Co2 21 20 - 33 mmol/L    Anion Gap 13.0 7.0 - 16.0    Glucose 156 (H) 65 - 99 mg/dL    Bun 14 8 - 22 mg/dL    Creatinine 0.96 0.50 - 1.40 mg/dL    Calcium 9.5 8.4 - 10.2 mg/dL    AST(SGOT) 19 12 - 45 U/L    ALT(SGPT) 16 2 - 50 U/L    Alkaline Phosphatase 65 30 - 99 U/L    Total Bilirubin 0.7 0.1 - 1.5 mg/dL    Albumin 4.0 3.2 - 4.9 g/dL    Total Protein 8.7 (H) 6.0 - 8.2 g/dL    Globulin 4.7 (H) 1.9 - 3.5 g/dL    A-G Ratio 0.9 g/dL   Troponin   Result Value Ref Range    Troponin T 15 6 - 19 ng/L   ESTIMATED GFR   Result Value Ref Range    GFR If African American >60 >60 mL/min/1.73 m 2    GFR If Non African American >60 >60 mL/min/1.73 m 2   PLATELET ESTIMATE   Result Value Ref Range    Plt Estimation Normal    MORPHOLOGY   Result Value Ref Range    RBC Morphology Present    DIFFERENTIAL COMMENT   Result Value Ref Range    Comments-Diff see below    LACTIC ACID   Result Value Ref Range    Lactic Acid 1.6 0.5 - 2.0 mmol/L   ETHYL ALCOHOL (BLOOD)   Result Value Ref Range    Diagnostic Alcohol <10.1 0.0 - 10.0 mg/dL   LIPASE   Result Value Ref Range    Lipase 30 7 - 58 U/L   URINALYSIS (UA)    Specimen: Urine, Clean Catch   Result Value Ref Range    Color Yellow     Character Clear     Specific Gravity 1.015 <1.035    Ph 5.5 5.0 - 8.0    Glucose >=1000 (A) Negative mg/dL    Ketones Trace (A) Negative mg/dL    Protein Negative Negative mg/dL    Bilirubin Negative Negative    Nitrite Negative Negative    Leukocyte Esterase Negative Negative    Occult Blood Moderate (A) Negative    Micro Urine Req Microscopic    URINE MICROSCOPIC (W/UA)   Result Value Ref Range    WBC 0-2 (A) /hpf    RBC 5-10 (A) /hpf    Bacteria Rare (A) None /hpf    Epithelial Cells Negative Few  "/hpf    Mucous Threads Few /hpf   EKG   Result Value Ref Range    Report       [facility] Emergency Dept[facility]    Test Date:  2021-10-24  Pt Name:    MARZENA ROMEO             Department: EDS  MRN:        [id number]                      Room:  Gender:     Male                         Technician: YANI  :        1950                   Requested By:ER TRIAGE PROTOCOL  Order #:    [id number]                    Reading MD:    Measurements  Intervals                                Axis  Rate:       88                           P:          35  NH:         183                          QRS:        -51  QRSD:       115                          T:          127  QT:         355  QTc:        430    Interpretive Statements  Sinus rhythm  Left anterior fascicular block  Anterior infarct, old  Abnormal T, consider ischemia, lateral leads  Compared to ECG 2021 13:49:54  T-wave abnormality now present  Possible ischemia now present  Left ventricular hypertrophy no longer present  Early repolarization no longer present  Myocardial infarct finding s till present     POCT glucose device results   Result Value Ref Range    Glucose - Accu-Ck 156 (H) 65 - 99 mg/dL       Discussed with Dr. Moe, hospitalist on-call, and he will kindly evaluate the patient for hospitalization.      Patient presents to the ER complaining of multiple things today.  He complains of feeling generally weak and dizzy/lightheaded for the last 2 weeks, worse over the last 2 days.  He also complains of intermittent chest pain which has been coming and going for the last 2 weeks.  He complains of shortness of breath since 2021 which he attributes to Covid as well as agent orange exposure.  He is also had abdominal pain for the last 4 weeks.  He says he is followed with GI and the GI physician he saw wants to do a test to \"rule out pancreatic cancer.\"  Patient says he has been feeling nauseated.  No vomiting.  He also has been " complaining of intermittent headache which starts at the right forehead and then moves to the left forehead.  He describes it as a mild headache.  The patient tells me that he is certain that the Farxiga that he started taking 2 weeks ago is causing his symptoms.  He said he had all of the same symptoms a year ago when he was on Farxiga.  He was taken off of the Farxiga due to the side effects and then restarted 2 weeks ago hoping that perhaps things had changed a bit with his condition and he could tolerate the medication better now.  I spoke with Nova, pharmacist.  We looked at the side effects of Farxiga and none of what patient is describing are reported side effects.  Patient has ischemic cardiomyopathy with a AICD pacemaker.  He has low EF at 15-20%.  He denies any palpitations.  We did interrogate his pacemaker and there was no ectopy or any abnormality seen per the pacemaker rep.  Patient's labs are fairly unremarkable.  His white count is normal.  Electrolytes are normal.  Troponin is normal.  EKG is nonacute and unchanged from previous.  At this time no evidence for STEMI or non-STEMI.  Urine is clear.  Chest x-ray is unremarkable.  Patient is a very poor historian.  I do not have a real good explanation for all of patient's symptoms but felt as though he warranted further evaluation work-up  Since he does have a complicated medical history.  I spoke with the hospitalist on-call and he has kindly come down to the ER to evaluate the patient for overnight hospitalization further evaluation work-up of his complaints.    2220: I had a call from the radiologist regarding the CT a of the head and neck that Dr. Moe had ordered.  The radiologist called here to the ER looking for  however, he had already gone off shift and left the ER.  Since I had taken care of this patient initially, I took the call.  The patient has a P2 occlusion.  I immediately contacted Dr. Moe and told him about the CT result.  He    Asked that we page Dr. Singh, neurologist on-call, to him so he can discuss the patient's CT scan in his case.  At this time from what both Dr. Moe and I  concluded from the patient's history is that his symptoms of dizziness either started or got significantly worse about 2 days ago.  The patient did not give me any history of vertigo, but gave Dr. Moe a history of vertigo.  For that reason the CT/CTA of the head and neck was ordered.  At this time Dr. Moe said he will manage the CT finding and consult all the appropriate specialists as needed since the patient had already gone upstairs to the floor at around 10 PM (prior to when the radiologist called with the results.)    I verified that the patient was wearing a mask and I was wearing appropriate PPE every time I entered the room. The patient's mask was on the patient at all times during my encounter     FINAL IMPRESSION  1. Chest pain  2. Generalized weakness  3. Nausea  4. Dyspnea  5. Dizziness       This dictation has been created using voice recognition software. The accuracy of the dictation is limited by the abilities of the software. I expect there may be some errors of grammar and possibly content. I made every attempt to manually correct the errors within my dictation. However, errors related to voice recognition software may still exist and should be interpreted within the appropriate context.    Electronically signed by: Agnieszka Benjamin M.D., 10/24/2021 5:21 PM

## 2021-10-25 NOTE — PROGRESS NOTES
Pt unable to answer the MRI screening only saying am tired and I have AICD on the left chest. Pt currently very nausea medicated per MAR.    Will pass on to the day RN to clarify it with pt before the time is due.

## 2021-10-25 NOTE — PROGRESS NOTES
Phone conversation with the hospitalist.    71-year-old male presenting with vertigo induced by change in position.  No focal weakness or numbness.  Has bilateral nystagmus per hospitalist.  No other cranial nerve findings.  A. fib on NOAC.  CTA head and neck revealed a right P2 occlusion.  Symptoms been going on for over 2 days now.  No intervention for this.  Recommend urgent MRI brain.  Will determine if the patient did have a new infarct or not.  And if so when to restart the NOAC.

## 2021-10-25 NOTE — ASSESSMENT & PLAN NOTE
MSSA  Patient with incidental blood cultures positive for gram positive cocci concerning for staph species  UA negative and CXR nonrevealing  Echo showed echodense partially mobile mass seen in the right atrium, difficult to assess if this is attached to the ICD wire, no evidence of tricuspid valve endocarditis, small pericardial effusion without evidence of hemodynamic compromise.  ID following, recommendations appreciated: explantation of AICD  Discussed with cardiology, Dr. Gutierrez, who agrees with removal of AICD. Dr. Gutierrez recommended to wait for couple more days, if last two blood cultures remains negative, then we will transfer the patient to the Main hospital for AICD removal.  Continue cefazolin

## 2021-10-25 NOTE — DISCHARGE PLANNING
Anticipated Discharge Disposition: TBD, likely home    Action: LSW completed chart review. No d/c needs identified.    Barriers to Discharge: None    Plan: LSW to follow and assist as needed.    Care Transition Team Assessment  From 6/21    Alysa AKHTARman, spouse, [phone]    Patient previously independent with all ADLs/IADLs.  He uses a cane at times, but not all the time.  He still drives himself to the doctor and the grocery store.  He has his wife and his daughter for support.  He uses the [facility] on [address] for his Rx needs.  He plans to discharge home with no needs when clear and states that his family will be able to pick him up.       Information Source  Orientation Level: Oriented X4  Information Given By: Patient  Who is responsible for making decisions for patient? : Patient     Readmission Evaluation  Is this a readmission?: No     Elopement Risk  Legal Hold: No  Ambulatory or Self Mobile in Wheelchair: No-Not an Elopement Risk  Elopement Risk: Not at Risk for Elopement     Interdisciplinary Discharge Planning  Primary Care Physician: CHANTEL Meraz  Lives with - Patient's Self Care Capacity: Spouse  Patient or legal guardian wants to designate a caregiver: No  Support Systems: Spouse / Significant Other, Children  Housing / Facility: [address]  Do You Take your Prescribed Medications Regularly: Yes  Able to Return to Previous ADL's: Yes  Mobility Issues: No  Prior Services: None  Patient Prefers to be Discharged to:: home  Assistance Needed: No  Durable Medical Equipment: Other - Specify (Cane)     Discharge Preparedness  What is your plan after discharge?: Home with help  What are your discharge supports?: Child, Spouse  Prior Functional Level: Independent with Activities of Daily Living, Independent with Medication Management  Difficulity with ADLs: None  Difficulity with IADLs: None     Functional Assesment  Prior Functional Level: Independent with Activities of Daily Living,  Independent with Medication Management     Finances  Financial Barriers to Discharge: No  Prescription Coverage: Yes     Vision / Hearing Impairment  Vision Impairment : No  Hearing Impairment : No           Advance Directive  Advance Directive?: DPOA for Health Care  Durable Power of  Name and Contact : Alysa Schulte, [phone]     Domestic Abuse  Have you ever been the victim of abuse or violence?: No  Physical Abuse or Sexual Abuse: No  Verbal Abuse or Emotional Abuse: No  Possible Abuse/Neglect Reported to:: Not Applicable     Psychological Assessment  History of Substance Abuse: None  History of Psychiatric Problems: No  Non-compliant with Treatment: No  Newly Diagnosed Illness: No     Discharge Risks or Barriers  Discharge risks or barriers?: No     Anticipated Discharge Information  Discharge Disposition: Discharged to home/self care (01)  Discharge Address: [address]  Discharge Contact Phone Number: [phone]

## 2021-10-25 NOTE — CONSULTS
INFECTIOUS DISEASES INPATIENT CONSULT NOTE     Date of Service: 10/25/2021    Consult Requested By: Ramesh Alston M.D.    Reason for Consultation: bacteremia    History of Present Illness:   Francesco Schulte is a 71 y.o. diabetic male admitted 10/24/2021 for weakness, dizziness, palpitations.+ AICD, Afib on apixaban, BPH, CVA. Denied fever. Attributed symptoms to DM medication. Symptoms x 2-3 weeks. No chest pain, but + palpitations. His AICD was interrogated in the ED, no arrhythmias or discharge.      His vertigo/dizziness started several days ago and described as room spinning and occurs whenever he moves his head from side to side. It also occurs when transferring from laying to seated position or when standing.  No neuro deficits. No LOC.  Radiology showed R PCA occlusion. Neurology consulted and recommended STAT MRI head   Currently on vancomycin  Infectious Diseases consulted for antibiotic recommendation and management    Review Of Systems:  Limited by somnolence  All other systems are reviewed and are negative     PMH:   Past Medical History:   Diagnosis Date   • Agent orange exposure    • Anesthesia     resistant to pain meds   • Cancer (HCC)     polycythemia vera   • Diabetes (HCC)     insulin and oral meds   • Family history of punctured lung 2002    left lung   • Heart attack (HCC) 03/2017   • Hemorrhagic disorder (HCC)     on blood thinners   • High cholesterol    • Ischemic cardiomyopathy    • Kidney stones     hx of kidney stones   • Orthostatic hypotension 3/29/2018   • Pain     headache pain   • Polycythemia vera(238.4)    • Stroke (HCC) 2016    r/t afib; eye issues resolved afterward   • Urinary bladder disorder     enlarged prostate, weak stream, difficulty urinating   • Vertigo      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 Isaac.   • SPLIT THICKNESS SKIN GRAFT N/A 8/23/2020     Procedure: APPLICATION, GRAFT, SKIN, SPLIT-THICKNESS;  Surgeon: Elisa Craig M.D.;  Location: SURGERY [facility];  Service: Plastics   • SKIN ABSCESS INCISION AND DRAINAGE Right 8/3/2020    Procedure: INCISION AND DRAINAGE - SCROTAL WOUND - WOUND VAC PLACEMENT;  Surgeon: Jaylen Hayes M.D.;  Location: [facility];  Service: Urology   • PB EXPLORE SCROTUM Right 7/31/2020    Procedure: EXPLORATION, SCROTUM - FOR WASH OUT OF SCROTAL WOUND & WOUND VAC PLACEMENT;  Surgeon: Ferny Rosales M.D.;  Location: [facility];  Service: Urology   • PB EXPLORE SCROTUM Right 7/29/2020    Procedure: EXPLORATION, SCROTUM - FOR I & D OF SCROTAL AND  GROIN WOUND;  Surgeon: Donta Viera M.D.;  Location: [facility];  Service: Urology   • PB INCIS/DRAIN SCROTUM/TESTIS,EPIDIDYM Right 7/25/2020    Procedure: INCISION AND DRAINAGE, SCROTUM;  Surgeon: Nick Negro M.D.;  Location: [facility];  Service: Urology   • TEMPORAL ARTERY BIOPSY Right 6/26/2018    Procedure: TEMPORAL ARTERY BIOPSY;  Surgeon: Rajendra Aguilar M.D.;  Location: SURGERY SAME DAY [facility];  Service: General   • CAROTID ENDARTERECTOMY Right 3/21/2018    Procedure: CAROTID ENDARTERECTOMY- W/EEG MONITORING;  Surgeon: Benny Morfin M.D.;  Location: SURGERY [facility];  Service: General   • APPENDECTOMY     • CATH PLACEMENT      right arm   • LAMINOTOMY      diskectomy; C4   • OTHER CARDIAC SURGERY      stents x 3       FAMILY HX:  No DM or rheum disease    SOCIAL HX:  Social History     Socioeconomic History   • Marital status:      Spouse name: Not on file   • Number of children: Not on file   • Years of education: Not on file   • Highest education level: Not on file   Occupational History   • Not on file   Tobacco Use   • Smoking status: Never Smoker   • Smokeless tobacco: Never Used   Vaping Use   • Vaping Use: Never used   Substance and Sexual Activity   • Alcohol use:  "No   • Drug use: No   • Sexual activity: Not on file   Other Topics Concern   • Not on file   Social History Narrative   • Not on file     Social Determinants of Health     Financial Resource Strain:    • Difficulty of Paying Living Expenses:    Food Insecurity:    • Worried About Running Out of Food in the Last Year:    • Ran Out of Food in the Last Year:    Transportation Needs:    • Lack of Transportation (Medical):    • Lack of Transportation (Non-Medical):    Physical Activity:    • Days of Exercise per Week:    • Minutes of Exercise per Session:    Stress:    • Feeling of Stress :    Social Connections:    • Frequency of Communication with Friends and Family:    • Frequency of Social Gatherings with Friends and Family:    • Attends Christianity Services:    • Active Member of Clubs or Organizations:    • Attends Club or Organization Meetings:    • Marital Status:    Intimate Partner Violence:    • Fear of Current or Ex-Partner:    • Emotionally Abused:    • Physically Abused:    • Sexually Abused:      Social History     Tobacco Use   Smoking Status Never Smoker   Smokeless Tobacco Never Used     Social History     Substance and Sexual Activity   Alcohol Use No       Allergies/Intolerances:  Allergies   Allergen Reactions   • Doxycycline      Swelling lips and difficulties swallowing   • Atorvastatin    • Beta Adrenergic Blockers Unspecified     dizziness   • Metformin Unspecified and Palpitations     Cardiac effects  \"Similar to a heart attack, I was hospitalized\"   • Simvastatin      Other reaction(s): Other (Specify with Comments)  Myalgias  Myalgias   • Statins [Hmg-Coa-R Inhibitors]      Muscle ache, joint pain         Other Current Medications:    Current Facility-Administered Medications:   •  acetaminophen (Tylenol) tablet 650 mg, 650 mg, Oral, Q4HRS PRN, Ramesh Alston M.D.  •  ceFAZolin (ANCEF) 2 g in  mL IVPB premix, 2 g, Intravenous, Q8HRS, Pema Asif M.D.  •  senna-docusate (PERICOLACE " or SENOKOT S) 8.6-50 MG per tablet 2 Tablet, 2 Tablet, Oral, BID **AND** polyethylene glycol/lytes (MIRALAX) PACKET 1 Packet, 1 Packet, Oral, QDAY PRN **AND** magnesium hydroxide (MILK OF MAGNESIA) suspension 30 mL, 30 mL, Oral, QDAY PRN **AND** bisacodyl (DULCOLAX) suppository 10 mg, 10 mg, Rectal, QDAY PRN, Blanquita Moe M.D.  •  enalaprilat (Vasotec) injection 1.25 mg 1 mL, 1.25 mg, Intravenous, Q6HRS PRN, Blanquita Moe M.D.  •  labetalol (NORMODYNE/TRANDATE) injection 10 mg, 10 mg, Intravenous, Q4HRS PRN, Blanquita Moe M.D.  •  insulin regular (HumuLIN R,NovoLIN R) injection, 2-9 Units, Subcutaneous, 4X/DAY ACHS, 2 Units at 10/25/21 0634 **AND** POC blood glucose manual result, , , Q AC AND BEDTIME(S) **AND** NOTIFY MD and PharmD, , , Once **AND** glucose 4 g chewable tablet 16 g, 16 g, Oral, Q15 MIN PRN **AND** dextrose 50% (D50W) injection 50 mL, 50 mL, Intravenous, Q15 MIN PRN, Blanquita Moe M.D.  •  [Held by provider] apixaban (ELIQUIS) tablet 5 mg, 5 mg, Oral, BID, Blanquita Moe M.D.  •  aspirin EC (ECOTRIN) tablet 81 mg, 81 mg, Oral, DAILY, Blanquita Moe M.D.  •  digoxin (LANOXIN) tablet 125 mcg, 125 mcg, Oral, DAILY AT 1800, Blanquita Moe M.D.  •  finasteride (PROSCAR) tablet 5 mg, 5 mg, Oral, DAILY, Blanquita Moe M.D.  •  metoprolol SR (TOPROL XL) tablet 25 mg, 25 mg, Oral, DAILY, Blanquita Moe M.D.  •  insulin glargine (Semglee) injection, 6 Units, Subcutaneous, QAM, Blanquita Moe M.D., 6 Units at 10/25/21 0634  •  melatonin tablet 5 mg, 5 mg, Oral, Nightly, Blanquita Moe M.D.  •  ondansetron (ZOFRAN) syringe/vial injection 4 mg, 4 mg, Intravenous, Q4HRS PRN, Blanquita Moe M.D., 4 mg at 10/25/21 0825  •  meclizine (ANTIVERT) tablet 25 mg, 25 mg, Oral, TID PRN, Blanquita Moe M.D., 25 mg at 10/25/21 0637  •  LORazepam (ATIVAN) injection 0.5 mg, 0.5 mg, Intravenous, Q4HRS PRN, Neptali David D.O., 0.5 mg at 10/25/21 1143      Most Recent Vital Signs:  /66   Pulse 94    "Temp 36.8 °C (98.3 °F) (Oral)   Resp 17   Ht 1.905 m (6' 3\")   Wt 86.5 kg (190 lb 11.2 oz)   SpO2 94%   BMI 23.84 kg/m²   Temp  Av.8 °C (98.2 °F)  Min: 36.4 °C (97.6 °F)  Max: 37.1 °C (98.7 °F)    Physical Exam:  General:Pale ill-appearing, well nourished no acute distress  HEENT: NCAT, PERRLA, sclera anicteric, PERRL, EOMI,  no eye drainage  Neck: supple, no lymphadenopathy  Chest: CTAB, unlabored.  Cardiac: IRR,  tachycardic  Abdomen: + bowel sounds, soft, non-tender, non-distended  Extremities: No cyanosis, clubbing. no edema, 2+ pulses  Skin: no rashes   Neuro: Somnolent, Speech fluent CN intact MALCOLM  Psych: unable to assess    Pertinent Lab Results:  Recent Labs     10/24/21  171   WBC 8.5      Recent Labs     10/24/21  1719   HEMOGLOBIN 16.4   HEMATOCRIT 50.0   .9*   MCH 36.0*   MACROCYTOSIS 3+*   ANISOCYTOSIS 2+*   PLATELETCT 189         Recent Labs     10/24/21  1719 10/25/21  0433   SODIUM 136 135   POTASSIUM 3.8 4.0   CHLORIDE 102 101   CO2 21 20   CREATININE 0.96 0.90        Recent Labs     10/24/21  171   ALBUMIN 4.0        Pertinent Micro:  Results     Procedure Component Value Units Date/Time    BLOOD CULTURE [[id number]]  (Abnormal) Collected: 10/24/21 1845    Order Status: Completed Specimen: Blood from Peripheral Updated: 10/25/21 1250     Significant Indicator POS     Source BLD     Site PERIPHERAL     Culture Result Growth detected by Bactec instrument. 10/25/2021  07:57  Gram Stain: Gram positive cocci: Possible Staphylococcus sp.  Staphylococcus aureus (methicillin sensitive)  detected by PCR.  Susceptibility to follow.      Narrative:      CALL  [facility]  [facility] tel. [phone],  CALLED  [facility] tel. [phone] 10/25/2021, 08:13, RB PERF. RESULTS CALLED TO: RN  [id number]  1 of 2 for Blood Culture x 2 sites order. Per Hospital  Policy: Only change Specimen Src: to \"Line\" if specified by  physician order.  Left AC    BLOOD CULTURE [[id number]]  (Abnormal) Collected: 10/24/21 1840    Order " "Status: Completed Specimen: Blood from Peripheral Updated: 10/25/21 0814     Significant Indicator POS     Source BLD     Site PERIPHERAL     Culture Result Growth detected by Bactec instrument. 10/25/2021  08:14  Gram Stain: Gram positive cocci: Possible Staphylococcus sp.  Blood culture testing and Gram stain, if indicated, are  performed at [facility], [address] [place] [address].  Positive blood cultures are  sent to [facility], [address] [place] [address], for organism identification and  susceptibility testing.      Narrative:      2 of 2 blood culture x2  Sites order. Per Hospital Policy:  Only change Specimen Src: to \"Line\" if specified by physician  order.  Left AC    URINALYSIS (UA) [[id number]]  (Abnormal) Collected: 10/24/21 1840    Order Status: Completed Specimen: Urine, Clean Catch Updated: 10/24/21 1958     Color Yellow     Character Clear     Specific Gravity 1.015     Ph 5.5     Glucose >=1000 mg/dL      Ketones Trace mg/dL      Protein Negative mg/dL      Bilirubin Negative     Nitrite Negative     Leukocyte Esterase Negative     Occult Blood Moderate     Micro Urine Req Microscopic        Blood Culture   Date Value Ref Range Status   01/03/2019   Final    No growth after 5 days of incubation.  Blood culture testing and Gram stain, if indicated, are  performed at [facility], [address] [place] [address].  Positive blood cultures are  sent to [facility], [address] [place] [address], for organism identification and  susceptibility testing.          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 Will continue to follow    Pema Asif M.D.

## 2021-10-25 NOTE — ASSESSMENT & PLAN NOTE
Continue finasteride  On 10/31 patient developed urinary obstruction  Andres placed on 10/31.  Started on tamsulosin

## 2021-10-25 NOTE — PROGRESS NOTES
Hospital Medicine Daily Progress Note    Date of Service  10/25/2021    Chief Complaint  Francesco Schulte is a 71 y.o. male admitted 10/24/2021 with dizziness    Hospital Course    Francesco Schulte is a 71 y.o. male who presented 10/24/2021 with dizziness, palpitations. PMH of insulin-dependent diabetes, HFrEF s/p AICD, lipidemia, atrial fibrillation on apixaban, BPH, CVA.   Patient states last year he was started on the dapagliflozin but it was discontinued after 2 weeks because he did not tolerate it. Symptoms at that time included dizziness, palpitations, abdominal pain, nausea, generalized weakness. About 2-3 weeks ago he and his physician decided to try it again, says he is having the same exact symptoms as he was last time. Says he does not have any britta chest pain he just feels palpitations. His AICD was interrogated in the ED, no arrhythmias or discharge.      His dizziness is described as room spinning and occurs whenever he moves his head from side to side. It also occurs when transferring from laying to seated position or when standing.     Addendum:  Radiology reported to ED that pt has a R PCA occlusion. Neurology, Dr Singh, was contacted and recommended STAT MRI head, the soonest that could be done at [facility] is in the morning per [facility], relayed that info to neuro who's ok with that. Unknown if new or old since pt has a known hx of R occipital stroke.     Per pt, his vertigo symptoms have been going on for a few days at least now, not acute. Neuro will determine further interventions after MRI head.      Interval Problem Update    10/25/2021: The patient is admitted for further evaluation and management of persistent dizziness.  Patient states that his symptoms started about 2 days prior to admission when he developed acute dizziness as well as lower extremity weakness.  Prior to that, patient states that he is highly active and had no problems prior to 2 days ago.  He describes the  dizziness as room is spinning and worse with positional changes of his head.  Upon arrival to the ED he was found to have right PCA occlusion on CTA of the head/neck.  He was evaluated by neurology with recommendations to hold apixaban until MRI of the brain is done.  At this time, it is unclear whether or not this is new or old as he has had previous occipital stroke in the past.  Pending final neurology recommendations.  He was incidentally found to have 2 sets of blood cultures concerning for staph species.  He has been started on empiric vancomycin therapy pending ID evaluation.  At this time no clear source for infection.  At the time of my examination patient still notes some lower extremity weakness however his physical examination was largely benign.  Patient still with persistent dizziness but only during movements of his head.  No chest pains, palpitations, shortness of breath noted.  No other overnight events reported.    I have personally seen and examined the patient at bedside. I discussed the plan of care with patient.    Consultants/Specialty  neurology    Code Status  Full Code    Disposition  Patient is not medically cleared.   Anticipate discharge to to home with close outpatient follow-up.  I have placed the appropriate orders for post-discharge needs.    Review of Systems  Review of Systems   Constitutional: Positive for malaise/fatigue. Negative for chills and fever.   HENT: Negative for congestion, hearing loss, sore throat and tinnitus.    Eyes: Negative for blurred vision, double vision, photophobia and discharge.   Respiratory: Negative for cough, hemoptysis, sputum production, shortness of breath and wheezing.    Cardiovascular: Negative for chest pain, palpitations, orthopnea, claudication and leg swelling.   Gastrointestinal: Negative for abdominal pain, blood in stool, diarrhea, heartburn, melena, nausea and vomiting.   Genitourinary: Negative for dysuria, flank pain, hematuria and  urgency.   Musculoskeletal: Negative for back pain, joint pain and myalgias.   Skin: Negative for itching and rash.   Neurological: Positive for dizziness and weakness. Negative for sensory change, speech change and headaches.   Endo/Heme/Allergies: Does not bruise/bleed easily.   Psychiatric/Behavioral: Negative for depression and suicidal ideas.        Physical Exam  Temp:  [36.4 °C (97.6 °F)-37.1 °C (98.7 °F)] 36.8 °C (98.3 °F)  Pulse:  [70-94] 94  Resp:  [12-25] 17  BP: (132-176)/() 132/66  SpO2:  [92 %-97 %] 94 %    Physical Exam  Vitals reviewed.   Constitutional:       Appearance: Normal appearance.   HENT:      Head: Normocephalic and atraumatic.      Nose: No congestion or rhinorrhea.      Mouth/Throat:      Mouth: Mucous membranes are moist.      Pharynx: Oropharynx is clear.   Eyes:      General: No scleral icterus.     Extraocular Movements: Extraocular movements intact.      Conjunctiva/sclera: Conjunctivae normal.      Pupils: Pupils are equal, round, and reactive to light.   Cardiovascular:      Rate and Rhythm: Normal rate and regular rhythm.      Pulses: Normal pulses.      Heart sounds: Normal heart sounds. No murmur heard.   No friction rub. No gallop.    Pulmonary:      Effort: Pulmonary effort is normal. No respiratory distress.      Breath sounds: Normal breath sounds. No stridor. No wheezing or rhonchi.   Abdominal:      General: Abdomen is flat. Bowel sounds are normal. There is no distension.      Palpations: Abdomen is soft. There is no mass.      Tenderness: There is no abdominal tenderness. There is no guarding or rebound.   Musculoskeletal:         General: No swelling, tenderness or deformity.      Cervical back: Neck supple. No rigidity. No muscular tenderness.   Lymphadenopathy:      Cervical: No cervical adenopathy.   Skin:     General: Skin is warm and dry.   Neurological:      General: No focal deficit present.      Mental Status: He is alert and oriented to person, place,  and time. Mental status is at baseline.      Cranial Nerves: No cranial nerve deficit.      Sensory: No sensory deficit.      Motor: No weakness.   Psychiatric:         Mood and Affect: Mood normal.         Behavior: Behavior normal.         Thought Content: Thought content normal.         Fluids    Intake/Output Summary (Last 24 hours) at 10/25/2021 1135  Last data filed at 10/25/2021 0856  Gross per 24 hour   Intake 220 ml   Output 300 ml   Net -80 ml       Laboratory  Recent Labs     10/24/21  1719   WBC 8.5   RBC 4.55*   HEMOGLOBIN 16.4   HEMATOCRIT 50.0   .9*   MCH 36.0*   MCHC 32.8*   RDW 69.2*   PLATELETCT 189   MPV 10.9     Recent Labs     10/24/21  1719 10/25/21  0433   SODIUM 136 135   POTASSIUM 3.8 4.0   CHLORIDE 102 101   CO2 21 20   GLUCOSE 156* 153*   BUN 14 11   CREATININE 0.96 0.90   CALCIUM 9.5 9.3                   Imaging  CT-CTA NECK WITH & W/O-POST PROCESSING   Final Result         1.  No high-grade stenosis, occlusion, or aneurysm appreciated.         CT-CTA HEAD WITH & W/O-POST PROCESS   Preliminary Result         1.  Right proximal P2 occlusion, reconstitutes distally   2.  Changes of atrophy with nonspecific white matter changes, most commonly associated with small vessel ischemia.      These findings were discussed with the patient's clinician, Dr. Farooq, on 10/24/2021 10:12 PM.      DX-CHEST-PORTABLE (1 VIEW)   Final Result      No acute cardiac or pulmonary abnormality is noted.      MR-BRAIN-W/O    (Results Pending)        Assessment/Plan  * Vertigo- (present on admission)  Assessment & Plan  -pt states he has a hx of vertigo which had resolved, complaining of vertigo now, dizziness like room spinning with rapid movement of head to one side  -pt with hx of CVA and CAD. Bilateral nystagmus on exam concerning for central cause   -Annette-Hallpike Maneuver for further eval not done since pt too nauseous   -meclizine and zofran for symptom control  -CT head and neck shows right PCA  occlusion however unclear if acute or chronic as patient has had previous posterior circulation stroke  - Neuro with recommendations to hold anticoagulation until MRI has resulted  - pending final neurology recommendations     Bacteremia  Assessment & Plan  Patient with incidental blood cultures positive for gram positive cocci concerning for staph species  No clear source of infection identified, UA negative and CXR nonrevealing  Started on empiric vancomycin  Pending ID recommendations     History of stroke- old right parietal/occipital- (present on admission)  Assessment & Plan  Continue aspirin. Patient not on statin due to history of statin intolerance    Ischemic cardiomyopathy- (present on admission)  Assessment & Plan  -Per cardiology notes. Status post AICD 09/2021  -Last echo 05/2021 with a EF of 15-20%  -Continue metoprolol and digoxin. Not on ACEI/ARB due to intolerance per cardiology notes  -Patient is to be on spironolactone per cardiology note 09/2021 however says is not taking it  -Med rec fluids bumetanide, patient states he is not on any diuretic  - no active volume overload at this time     Orthostatic hypotension- (present on admission)  Assessment & Plan  -Reported history of orthostatic hypotension per notes, currently complaining of dizziness upon change of position consistent with orthostatic hypotension  -check orthostatic vitals   -Would be difficult to keep patient hydrated due to history of HFrEF    Essential hypertension- (present on admission)  Assessment & Plan  -Continue metoprolol    CAD (coronary artery disease)- (present on admission)  Assessment & Plan  Continue aspirin. Patient not on statin due to history of statin intolerance  Goal directed medical therapy as tolerated  No active chest pain at this time     Type 2 diabetes mellitus without complication, with long-term current use of insulin (HCC)- (present on admission)  Assessment & Plan  -Continue long-acting  insulin  -Insulin sliding scale and hypoglycemia protocol while in hospital    Paroxysmal A-fib (HCC)- (present on admission)  Assessment & Plan  -Rate controlled, not in RVR  -Continue metoprolol, digoxin   - apixaban currently on hold by neurology     Benign prostatic hyperplasia without lower urinary tract symptoms- (present on admission)  Assessment & Plan  Continue finasteride       VTE prophylaxis: therapeutic anticoagulation with apixaban    I have performed a physical exam and reviewed and updated ROS and Plan today (10/25/2021). In review of yesterday's note (10/24/2021), there are no changes except as documented above.

## 2021-10-25 NOTE — ASSESSMENT & PLAN NOTE
-Reported history of orthostatic hypotension per notes, currently complaining of dizziness upon change of position consistent with orthostatic hypotension  -check orthostatic vitals   -Would be difficult to keep patient hydrated due to history of HFrEF

## 2021-10-25 NOTE — PROGRESS NOTES
"\"Covid 19 surge in effect\"    Report received from ER RN. Pt is AAO x 4.  Pt reports no pain at this time but pt was very nauseated and vomiting. POC discussed to control nausea and dizziness. Pt requested lots of blankets and it was provided. Waffle mattress placed on bed. Pt now resting in bed with no signs of distress.All needs met at this time.Bed in low position and locked.Bed alarm is on.Call light within reach.Rounding in place.       Pt skin was intact except the left middle toes has a cut on it.  "

## 2021-10-25 NOTE — ED TRIAGE NOTES
"Chief Complaint   Patient presents with   • Chest Pain     on and off for last few weeks   • Shortness of Breath     since he had covid in january but worsened today    • Weakness     worsened today    • Dizziness     started today     /95   Pulse 84   Temp 36.7 °C (98.1 °F) (Temporal)   Resp 20   Ht 1.905 m (6' 3\")   Wt 83.9 kg (185 lb)   SpO2 97%   BMI 23.12 kg/m²     Pt reports that he has had recent changes to his heart medications that has been making it hard to check his blood sugars at home. Blood sugar was 156.   "

## 2021-10-25 NOTE — PROGRESS NOTES
Paged MD Alston via [employer] that patient is unable to get MRI done until 10/26 at 3pm.   Paged via paging service MD at 1500. MD aware.

## 2021-10-25 NOTE — PROGRESS NOTES
Patient c/o headache and this nurse has brought in apple juice to go with Tylanol. Given patient sip of apple juice prior to giving medication. Patient started coughing and unable to swallow. Reposition patient to 90 degrees. Notified MD. NPO until speech evaluation per MD.

## 2021-10-26 ENCOUNTER — APPOINTMENT (OUTPATIENT)
Dept: RADIOLOGY | Facility: MEDICAL CENTER | Age: 71
DRG: 872 | End: 2021-10-26
Attending: PSYCHIATRY & NEUROLOGY
Payer: MEDICARE

## 2021-10-26 ENCOUNTER — APPOINTMENT (OUTPATIENT)
Dept: RADIOLOGY | Facility: MEDICAL CENTER | Age: 71
DRG: 872 | End: 2021-10-26
Attending: STUDENT IN AN ORGANIZED HEALTH CARE EDUCATION/TRAINING PROGRAM
Payer: MEDICARE

## 2021-10-26 LAB
ANION GAP SERPL CALC-SCNC: 14 MMOL/L (ref 7–16)
BUN SERPL-MCNC: 15 MG/DL (ref 8–22)
CALCIUM SERPL-MCNC: 8.5 MG/DL (ref 8.4–10.2)
CHLORIDE SERPL-SCNC: 103 MMOL/L (ref 96–112)
CO2 SERPL-SCNC: 19 MMOL/L (ref 20–33)
CREAT SERPL-MCNC: 0.8 MG/DL (ref 0.5–1.4)
ERYTHROCYTE [DISTWIDTH] IN BLOOD BY AUTOMATED COUNT: 68.9 FL (ref 35.9–50)
GLUCOSE BLD-MCNC: 147 MG/DL (ref 65–99)
GLUCOSE BLD-MCNC: 149 MG/DL (ref 65–99)
GLUCOSE BLD-MCNC: 172 MG/DL (ref 65–99)
GLUCOSE BLD-MCNC: 230 MG/DL (ref 65–99)
GLUCOSE SERPL-MCNC: 134 MG/DL (ref 65–99)
HCT VFR BLD AUTO: 45.8 % (ref 42–52)
HGB BLD-MCNC: 15 G/DL (ref 14–18)
MAGNESIUM SERPL-MCNC: 2 MG/DL (ref 1.5–2.5)
MCH RBC QN AUTO: 36.3 PG (ref 27–33)
MCHC RBC AUTO-ENTMCNC: 32.8 G/DL (ref 33.7–35.3)
MCV RBC AUTO: 110.9 FL (ref 81.4–97.8)
PHOSPHATE SERPL-MCNC: 3 MG/DL (ref 2.5–4.5)
PLATELET # BLD AUTO: 112 K/UL (ref 164–446)
PMV BLD AUTO: 11.7 FL (ref 9–12.9)
POTASSIUM SERPL-SCNC: 3.6 MMOL/L (ref 3.6–5.5)
RBC # BLD AUTO: 4.13 M/UL (ref 4.7–6.1)
SODIUM SERPL-SCNC: 136 MMOL/L (ref 135–145)
WBC # BLD AUTO: 7.5 K/UL (ref 4.8–10.8)

## 2021-10-26 PROCEDURE — 80048 BASIC METABOLIC PNL TOTAL CA: CPT

## 2021-10-26 PROCEDURE — A9270 NON-COVERED ITEM OR SERVICE: HCPCS | Performed by: STUDENT IN AN ORGANIZED HEALTH CARE EDUCATION/TRAINING PROGRAM

## 2021-10-26 PROCEDURE — 700111 HCHG RX REV CODE 636 W/ 250 OVERRIDE (IP): Performed by: INTERNAL MEDICINE

## 2021-10-26 PROCEDURE — 770006 HCHG ROOM/CARE - MED/SURG/GYN SEMI*

## 2021-10-26 PROCEDURE — 700102 HCHG RX REV CODE 250 W/ 637 OVERRIDE(OP): Performed by: STUDENT IN AN ORGANIZED HEALTH CARE EDUCATION/TRAINING PROGRAM

## 2021-10-26 PROCEDURE — 36415 COLL VENOUS BLD VENIPUNCTURE: CPT

## 2021-10-26 PROCEDURE — 87040 BLOOD CULTURE FOR BACTERIA: CPT | Mod: 91

## 2021-10-26 PROCEDURE — 83735 ASSAY OF MAGNESIUM: CPT

## 2021-10-26 PROCEDURE — 74181 MRI ABDOMEN W/O CONTRAST: CPT | Mod: ME

## 2021-10-26 PROCEDURE — 82962 GLUCOSE BLOOD TEST: CPT | Mod: 91

## 2021-10-26 PROCEDURE — 70551 MRI BRAIN STEM W/O DYE: CPT | Mod: MG

## 2021-10-26 PROCEDURE — 92526 ORAL FUNCTION THERAPY: CPT

## 2021-10-26 PROCEDURE — 85027 COMPLETE CBC AUTOMATED: CPT

## 2021-10-26 PROCEDURE — 99233 SBSQ HOSP IP/OBS HIGH 50: CPT | Performed by: INTERNAL MEDICINE

## 2021-10-26 PROCEDURE — 99232 SBSQ HOSP IP/OBS MODERATE 35: CPT | Performed by: STUDENT IN AN ORGANIZED HEALTH CARE EDUCATION/TRAINING PROGRAM

## 2021-10-26 PROCEDURE — 84100 ASSAY OF PHOSPHORUS: CPT

## 2021-10-26 RX ADMIN — FINASTERIDE 5 MG: 5 TABLET, FILM COATED ORAL at 06:18

## 2021-10-26 RX ADMIN — Medication 5 MG: at 21:48

## 2021-10-26 RX ADMIN — CEFAZOLIN 2 G: 1 INJECTION, POWDER, FOR SOLUTION INTRAVENOUS at 06:17

## 2021-10-26 RX ADMIN — ASPIRIN 81 MG: 81 TABLET, COATED ORAL at 06:18

## 2021-10-26 RX ADMIN — INSULIN HUMAN 3 UNITS: 100 INJECTION, SOLUTION PARENTERAL at 11:44

## 2021-10-26 RX ADMIN — METOPROLOL SUCCINATE 25 MG: 25 TABLET, EXTENDED RELEASE ORAL at 06:18

## 2021-10-26 RX ADMIN — CEFAZOLIN 2 G: 1 INJECTION, POWDER, FOR SOLUTION INTRAVENOUS at 21:48

## 2021-10-26 RX ADMIN — INSULIN GLARGINE 6 UNITS: 100 INJECTION, SOLUTION SUBCUTANEOUS at 06:20

## 2021-10-26 ASSESSMENT — ENCOUNTER SYMPTOMS
ABDOMINAL PAIN: 0
WEAKNESS: 1
SHORTNESS OF BREATH: 0
VOMITING: 0
CHILLS: 0
MYALGIAS: 0
DIZZINESS: 1
NAUSEA: 0
EYES NEGATIVE: 1
FEVER: 0
COUGH: 0
DIARRHEA: 0
NAUSEA: 1
FOCAL WEAKNESS: 1

## 2021-10-26 ASSESSMENT — PAIN DESCRIPTION - PAIN TYPE
TYPE: ACUTE PAIN

## 2021-10-26 NOTE — DISCHARGE PLANNING
Anticipated Discharge Disposition: likely SNF    Action: LSW completed chart review. SLP recommending post acute placement.    Discussed pt in IDT rounds. Per MD, pending blood cultures and MRI brain. Per MD, will likely need 4 weeks IV abx.    LSW met with pt to discuss possible post acute placement. Per pt, he would like to wait a day to see results and how he is going. LSW provided SNF and rehab list to pt.    Barriers to Discharge: POC/GOC    Plan: LSW to follow up with team for POC/GOC. LSW to follow up with pt for placement choice.

## 2021-10-26 NOTE — PROGRESS NOTES
Infectious Disease Progress Note    Author: Pema Asif M.D. Date & Time of service: 10/26/2021  11:53 AM    Chief Complaint:  MSSA sepsis    Interval History:  71 y.o. diabetic male admitted 10/24/2021 for weakness, dizziness, palpitations.+ AICD, Afib on apixaban, BPH, CVA found to have new CVA and above  10/26 AF WBC more alert today-still has LE weakness and can't walk    Labs Reviewed, Medications Reviewed and Radiology Reviewed.    Review of Systems:  Review of Systems   Constitutional: Positive for malaise/fatigue. Negative for fever.   Gastrointestinal: Negative for nausea and vomiting.   Skin: Negative for itching and rash.   Neurological: Positive for focal weakness.   All other systems reviewed and are negative.      Hemodynamics:  Temp (24hrs), Av.4 °C (97.6 °F), Min:36.3 °C (97.3 °F), Max:36.5 °C (97.7 °F)  Temperature: 36.4 °C (97.6 °F)  Pulse  Av.1  Min: 63  Max: 103   Blood Pressure : 108/63       Physical Exam:  Physical Exam  Vitals and nursing note reviewed.   Constitutional:       General: He is not in acute distress.     Appearance: He is not ill-appearing, toxic-appearing or diaphoretic.   HENT:      Nose: No rhinorrhea.      Mouth/Throat:      Pharynx: No oropharyngeal exudate or posterior oropharyngeal erythema.   Eyes:      Extraocular Movements: Extraocular movements intact.      Pupils: Pupils are equal, round, and reactive to light.   Cardiovascular:      Rate and Rhythm: Normal rate. Rhythm irregular.      Heart sounds: Murmur heard.        Comments: AICD nontender  Pulmonary:      Effort: Pulmonary effort is normal. No respiratory distress.   Abdominal:      General: There is no distension.      Palpations: Abdomen is soft.      Tenderness: There is no abdominal tenderness.   Musculoskeletal:      Cervical back: Neck supple. No tenderness.      Right lower leg: No edema.      Left lower leg: No edema.   Skin:     Coloration: Skin is not jaundiced.      Findings:  Bruising present.   Neurological:      Mental Status: He is alert.      Sensory: No sensory deficit.      Motor: Weakness present.   Psychiatric:         Mood and Affect: Mood normal.         Behavior: Behavior normal.         Meds:    Current Facility-Administered Medications:   •  acetaminophen  •  ceFAZolin  •  NS  •  senna-docusate **AND** polyethylene glycol/lytes **AND** magnesium hydroxide **AND** bisacodyl  •  enalaprilat  •  labetalol  •  insulin regular **AND** POC blood glucose manual result **AND** NOTIFY MD and PharmD **AND** glucose **AND** dextrose 50%  •  [Held by provider] apixaban  •  aspirin EC  •  digoxin  •  finasteride  •  metoprolol SR  •  insulin glargine  •  melatonin  •  ondansetron  •  meclizine  •  LORazepam    Labs:  Recent Labs     10/24/21  1719 10/26/21  0246   WBC 8.5 7.5   RBC 4.55* 4.13*   HEMOGLOBIN 16.4 15.0   HEMATOCRIT 50.0 45.8   .9* 110.9*   MCH 36.0* 36.3*   RDW 69.2* 68.9*   PLATELETCT 189 112*   MPV 10.9 11.7   NEUTSPOLYS 85.90*  --    LYMPHOCYTES 4.90*  --    MONOCYTES 6.60  --    EOSINOPHILS 1.10  --    BASOPHILS 0.90  --    RBCMORPHOLO Present  --      Recent Labs     10/24/21  1719 10/25/21  0433 10/26/21  0246   SODIUM 136 135 136   POTASSIUM 3.8 4.0 3.6   CHLORIDE 102 101 103   CO2 21 20 19*   GLUCOSE 156* 153* 134*   BUN 14 11 15     Recent Labs     10/24/21  1719 10/25/21  0433 10/26/21  0246   ALBUMIN 4.0  --   --    TBILIRUBIN 0.7  --   --    ALKPHOSPHAT 65  --   --    TOTPROTEIN 8.7*  --   --    ALTSGPT 16  --   --    ASTSGOT 19  --   --    CREATININE 0.96 0.90 0.80       Imaging:  CT-CTA HEAD WITH & W/O-POST PROCESS    Result Date: 10/25/2021  10/24/2021 9:03 PM HISTORY/REASON FOR EXAM:  Dizziness, dehydration or hypotension; vertigo new onset, vasculopath TECHNIQUE/EXAM DESCRIPTION: CT angiogram of the [place] without and with contrast.  Initial precontrast images were obtained of the head from the skull base through the vertex.  Postcontrast  images were obtained of the [place] following the power injection of nonionic contrast at 5.0 mL/sec. Thin-section helical images were obtained with overlapping reconstruction interval. Coronal and sagittal multiplanar volume reformats were generated.  3D angiographic images were reviewed on PACS.  Maximum intensity projection (MIP) images were generated and reviewed. 80 mL of Omnipaque 350 nonionic contrast was injected intravenously. Low dose optimization technique was utilized for this CT exam including automated exposure control and adjustment of the mA and/or kV according to patient size. COMPARISON:  August 9, 2018. FINDINGS: The posterior circulation shows the distal vertebral arteries to be patent. Hypoplastic distal left vertebral artery is seen. Atherosclerotic changes in the distal right vertebral artery are seen, resulting in less than 50% stenosis The vertebrobasilar confluence is intact. The basilar artery is patent. Right proximal P2 occlusion is seen which reconstitutes distally. Persistent fetal morphology of the left posterior cerebral artery is seen. Atherosclerotic changes are seen of the bilateral intracranial internal carotid arteries resulting in less than 50% stenosis, otherwise the anterior circulation shows no stenotic or occlusive lesion. No aneurysm is evident about the [place]. Mild diffuse parenchymal volume loss is seen. There is mild periventricular and subcortical white matter low-attenuation changes. Low-density changes in the right parietal occipital lobe are seen compatible with encephalomalacia. 3D angiographic/MIP images of the vasculature confirm the vascular findings as described above.     1.  Right proximal P2 occlusion, reconstitutes distally 2.  Changes of atrophy with nonspecific white matter changes, most commonly associated with small vessel ischemia. These findings were discussed with the patient's clinician, Dr. Farooq, on 10/24/2021 10:12  PM.    CT-CTA NECK WITH & W/O-POST PROCESSING    Result Date: 10/24/2021    10/24/2021 9:03 PM HISTORY/REASON FOR EXAM:  Vertigo new onset, vasculopath TECHNIQUE/EXAM DESCRIPTION:  CT angiogram of the neck with contrast. Postcontrast images were obtained of the neck from the great vessels through the skull base following the power injection of nonionic contrast at 5.0 mL/sec. Thin-section helical images were obtained with overlapping reconstruction interval. Coronal and oblique multiplanar volume reformats were generated. Cervical internal carotid artery percent stenosis is calculated using the standard method according to the NASCET criteria wherein a segment of uniform caliber mid or distal cervical internal carotid is used as the reference denominator. 3D angiographic images were reviewed on PACS.  Maximum intensity projection (MIP) images were generated and reviewed 80 mL of Omnipaque 350 nonionic contrast was injected intravenously. Low dose optimization technique was utilized for this CT exam including automated exposure control and adjustment of the mA and/or kV according to patient size. COMPARISON: August 9, 2018 FINDINGS: 1.4 cm peripherally calcified left thyroid cyst is seen. Aortic arch: bovine type branching pattern. There is atherosclerotic plaque of the aorta. Right common carotid artery: Patent Right internal carotid artery: Atherosclerotic plaque without significant stenosis (less than 50%). Left common carotid artery is patent. Left internal carotid artery: Atherosclerotic plaque without significant stenosis (less than 50%). The right vertebral artery is patent without dissection or stenosis. The left vertebral artery is patent without dissection or stenosis. Vertebrobasilar confluence: The vertebrobasilar confluence appears normal. Lung apices are clear The soft tissues of the neck are within normal limits. 3D angiographic/MIP images of the vasculature confirm the vascular findings as described  "above.     1.  No high-grade stenosis, occlusion, or aneurysm appreciated.     DX-CHEST-PORTABLE (1 VIEW)    Result Date: 10/24/2021  10/24/2021 5:30 PM HISTORY/REASON FOR EXAM:  Chest Pain TECHNIQUE/EXAM DESCRIPTION AND NUMBER OF VIEWS: Single portable view of the chest. COMPARISON:  7/29/2021, 5/28/2021 FINDINGS: Left bipolar transvenous pacing device remains in place. The cardiac silhouette is within normal limits. No infiltrates or consolidations are noted. No pleural effusion is noted.     No acute cardiac or pulmonary abnormality is noted.      Micro:  Results     Procedure Component Value Units Date/Time    BLOOD CULTURE [[id number]]  (Abnormal) Collected: 10/24/21 1840    Order Status: Completed Specimen: Blood from Peripheral Updated: 10/26/21 0900     Significant Indicator POS     Source BLD     Site PERIPHERAL     Culture Result Growth detected by Bactec instrument. 10/25/2021  08:14  Blood culture testing and Gram stain, if indicated, are  performed at [address].  Positive blood cultures are  sent to [facility], [address] [place] [address], for organism identification and  susceptibility testing.        Staphylococcus aureus    Narrative:      2 of 2 blood culture x2  Sites order. Per Hospital Policy:  Only change Specimen Src: to \"Line\" if specified by physician  order.  Left AC    BLOOD CULTURE [[id number]]  (Abnormal) Collected: 10/24/21 1845    Order Status: Completed Specimen: Blood from Peripheral Updated: 10/26/21 0858     Significant Indicator POS     Source BLD     Site PERIPHERAL     Culture Result Growth detected by Bactec instrument. 10/25/2021  07:57  Staphylococcus aureus (methicillin sensitive)  detected by PCR.  Susceptibility to follow.        Staphylococcus aureus    Narrative:      CALL  [facility]  [facility] tel. [phone],  CALLED  [facility] tel. [phone] 10/25/2021, 13:01, RB PERF. RESULTS CALLED  TO:[facility] " "pharm  1 of 2 for Blood Culture x 2 sites order. Per Hospital  Policy: Only change Specimen Src: to \"Line\" if specified by  physician order.  Left AC    BLOOD CULTURE [[id number]] Collected: 10/26/21 0807    Order Status: Completed Specimen: Blood from Peripheral Updated: 10/26/21 0815    Narrative:      Per Hospital Policy: Only change Specimen Src: to \"Line\" if  specified by physician order.    BLOOD CULTURE [[id number]] Collected: 10/26/21 0807    Order Status: Completed Specimen: Blood from Peripheral Updated: 10/26/21 0815    Narrative:      Per Hospital Policy: Only change Specimen Src: to \"Line\" if  specified by physician order.    URINALYSIS (UA) [[id number]]  (Abnormal) Collected: 10/24/21 1840    Order Status: Completed Specimen: Urine, Clean Catch Updated: 10/24/21 1958     Color Yellow     Character Clear     Specific Gravity 1.015     Ph 5.5     Glucose >=1000 mg/dL      Ketones Trace mg/dL      Protein Negative mg/dL      Bilirubin Negative     Nitrite Negative     Leukocyte Esterase Negative     Occult Blood Moderate     Micro Urine Req Microscopic          Assessment:  Active Hospital Problems    Diagnosis    • *Vertigo [R42]    • Bacteremia [R78.81]    • Ischemic cardiomyopathy [I25.5]    • Orthostatic hypotension [I95.1]    • Essential hypertension [I10]    • History of stroke- old right parietal/occipital [Z86.73]    • CAD (coronary artery disease) [I25.10]    • Type 2 diabetes mellitus without complication, with long-term current use of insulin (HCC) [E11.9, Z79.4]    • Paroxysmal A-fib (HCC) [I48.0]    • Benign prostatic hyperplasia without lower urinary tract symptoms [N40.0]        Plan:  MSSA sepsis  AICD in place  Afebrile  No leukocytosis  BCxs + MSSA 10/24  Repeat Bcxs every 48 hours until neg  PICC or midline when Bcxs neg 48 hours  Recommend PAMELA s has AICD  Continuecefazolin  Anticipate 4 weeks IV abx from date of negative blood cxs if no complications    New CVA  Agree with MRI  At risk " mycotic aneurysm/emboli/bleeding    Immunosuppressed  PV  Lupus      Diabetes  Keep BS under 150 to help control current infection    Discussed with RN

## 2021-10-26 NOTE — THERAPY
Speech Language Pathology  Daily Treatment     Patient Name: Francesco Schulte  Age:  71 y.o., Sex:  male  Medical Record #: [id number]  Today's Date: 10/26/2021     Precautions  Precautions: (P) Swallow Precautions ( See Comments)    Assessment    Pt seen on this date for a repeat swallow evaluation. MRI not conducted yesterday but will take place today at 1500. Pt less lethargic today, but having difficulty recalling events since admit. He c/o generalized weakness most notably in lower extremities. Vocal quality was clear and no gross asymmetry of the oral motor musculature. He consumed 4oz of MTL, 4 oz apple sauce, 4 oz of pudding, 3 oz of soft solids, and 8 oz of thin liquids with no overt s/sx of aspiration. Throat clear x1 appreciated at end of thin liquid trials. He was able to feed self independently but 2x during session he almost fell asleep while taking a break so may benefit from intermittent supervision. Slightly prolonged but functional mastication of soft solids; did not trial solids 2/2 weakness. He denied any globus sensation or nausea. At this time, recommend that pt begin a soft and bite size/thin liquid diet with monitoring during meals. Per RN, pt with coughing event yesterday when taking medications whole with liquid wash so may benefit from medications float whole in puree. Please discontinue PO with any overt s/sx of aspiration or difficulty.    Plan    1) recommend that pt begin a soft and bite size/thin liquid diet with monitoring during meals  2) pills float whole in puree  3) plan for cognitive-linguistic evaluation next therapy session    Continue current treatment plan.    Discharge Recommendations: (P) Recommend post-acute placement for additional speech therapy services prior to discharge home       Objective       10/26/21 1041   Pain 0 - 10 Group   Therapist Pain Assessment Post Activity Pain Same as Prior to Activity;Nurse Notified   Cognitive-Linguistic   Level of Consciousness  Alert   Dysphagia    Dysphagia X   Positioning / Behavior Modification Modulate Rate or Bite Size;Self Monitoring   Other Treatments prefeeding trials   Diet / Liquid Recommendation NPO;Pre-Feeding Trials with SLP Only   Nutritional Liquid Intake Rating Scale Nothing by mouth   Nutritional Food Intake Rating Scale Nothing by mouth   Recommended Route of Medication Administration   Medication Administration  Crush all Medications in Puree;Float Whole with Puree   Short Term Goals   Short Term Goal # 1 Pt will consume an SB6/TN0 diet with no overt s/sx of aspiration

## 2021-10-26 NOTE — CARE PLAN
The patient is Watcher - Medium risk of patient condition declining or worsening    Shift Goals  Clinical Goals: Maintain comfort with dizziness, remain free of falls  Patient Goals: Patient will get adequate rest and sleep.  Family Goals: No family present    Progress made toward(s) clinical / shift goals:  yes    Patient is not progressing towards the following goals:

## 2021-10-26 NOTE — THERAPY
Missed Therapy     Patient Name: Francesco Schulte  Age:  71 y.o., Sex:  male  Medical Record #: [id number]  Today's Date: 10/25/2021    OT eval order received and acknowledged, chart reviewed. Attempted OT eval this AM, but per RN, pt currently not appropriate and still pending MRI. Wll reattempt OT eval once pt is able and appropriate.     Ce Zimmer, OT  [id number]

## 2021-10-26 NOTE — PROGRESS NOTES
Hospital Medicine Daily Progress Note    Date of Service  10/26/2021    Chief Complaint  Francesco Schulte is a 71 y.o. male admitted 10/24/2021 with dizziness    Hospital Course  A 71-year-old man with ho DM, HLD, HFrEF, s/p AICD, Afib (AC with apixaban, BPH, CVA presented with dizziness, lower extremity weakness for 2 days.   CTA showed rigth proximal P2 occlusion. Neurology recommended MRI brain. Blood cultures positive for MSSA. ID recommended continue cefazolin, anticipate 4 weeks of antibiotics.    Interval Problem Update  10/26: Patient reports dizziness on standing, nausea, no strength on b/l legs. Afebrile, hemodynamically stable. On room air. Patient had some pancreatic mass, and supposed to get MRI of pancreas in 2 weeks. MRCP ordered.   PPM  No stool x2 days.  Blood cultures from 10/24 positive for S.aureus    I have personally seen and examined the patient at bedside. I discussed the plan of care with patient, bedside RN, charge RN,  and pharmacy.    Consultants/Specialty  infectious disease    Code Status  Full Code    Disposition  Patient is not medically cleared.   Anticipate discharge to to home with close outpatient follow-up.  I have placed the appropriate orders for post-discharge needs.    Review of Systems  Review of Systems   Constitutional: Positive for malaise/fatigue. Negative for chills and fever.   HENT: Negative.    Eyes: Negative.    Respiratory: Negative for cough and shortness of breath.    Cardiovascular: Negative for chest pain and leg swelling.   Gastrointestinal: Positive for nausea. Negative for abdominal pain, diarrhea and vomiting.   Genitourinary: Negative for dysuria.   Musculoskeletal: Negative for myalgias.   Skin: Negative for rash.   Neurological: Positive for dizziness and weakness.        Physical Exam  Temp:  [36.3 °C (97.3 °F)-36.5 °C (97.7 °F)] 36.4 °C (97.6 °F)  Pulse:  [] 73  Resp:  [17-18] 17  BP: (108-130)/(62-70) 108/63  SpO2:  [92 %] 92  %    Physical Exam  Vitals and nursing note reviewed.   Constitutional:       Appearance: He is ill-appearing.   HENT:      Head: Normocephalic and atraumatic.      Mouth/Throat:      Mouth: Mucous membranes are moist.      Pharynx: Oropharynx is clear.   Eyes:      Pupils: Pupils are equal, round, and reactive to light.   Cardiovascular:      Rate and Rhythm: Normal rate and regular rhythm.   Pulmonary:      Effort: Pulmonary effort is normal. No respiratory distress.      Breath sounds: No wheezing or rales.   Abdominal:      General: Abdomen is flat. Bowel sounds are normal. There is no distension.      Tenderness: There is no abdominal tenderness. There is no guarding.   Musculoskeletal:         General: Normal range of motion.      Cervical back: Normal range of motion.   Skin:     General: Skin is warm.   Neurological:      General: No focal deficit present.      Mental Status: He is alert and oriented to person, place, and time.         Fluids    Intake/Output Summary (Last 24 hours) at 10/26/2021 1351  Last data filed at 10/26/2021 0617  Gross per 24 hour   Intake --   Output 900 ml   Net -900 ml       Laboratory  Recent Labs     10/24/21  1719 10/26/21  0246   WBC 8.5 7.5   RBC 4.55* 4.13*   HEMOGLOBIN 16.4 15.0   HEMATOCRIT 50.0 45.8   .9* 110.9*   MCH 36.0* 36.3*   MCHC 32.8* 32.8*   RDW 69.2* 68.9*   PLATELETCT 189 112*   MPV 10.9 11.7     Recent Labs     10/24/21  1719 10/25/21  0433 10/26/21  0246   SODIUM 136 135 136   POTASSIUM 3.8 4.0 3.6   CHLORIDE 102 101 103   CO2 21 20 19*   GLUCOSE 156* 153* 134*   BUN 14 11 15   CREATININE 0.96 0.90 0.80   CALCIUM 9.5 9.3 8.5                   Imaging  CT-CTA NECK WITH & W/O-POST PROCESSING   Final Result         1.  No high-grade stenosis, occlusion, or aneurysm appreciated.         CT-CTA HEAD WITH & W/O-POST PROCESS   Final Result         1.  Right proximal P2 occlusion, reconstitutes distally   2.  Changes of atrophy with nonspecific white matter  changes, most commonly associated with small vessel ischemia.      These findings were discussed with the patient's clinician, Dr. Farooq, on 10/24/2021 10:12 PM.      DX-CHEST-PORTABLE (1 VIEW)   Final Result      No acute cardiac or pulmonary abnormality is noted.      MR-BRAIN-W/O    (Results Pending)   EC-PAMELA W/O CONT    (Results Pending)   PB-VGOKXFX-K/O    (Results Pending)        Assessment/Plan  * Vertigo- (present on admission)  Assessment & Plan  -pt states he has a hx of vertigo which had resolved, complaining of vertigo now, dizziness like room spinning with rapid movement of head to one side  -pt with hx of CVA and CAD. Bilateral nystagmus on exam concerning for central cause   -[place] Maneuver for further eval not done since pt too nauseous   -meclizine and zofran for symptom control  -CT head and neck shows right PCA occlusion however unclear if acute or chronic as patient has had previous posterior circulation stroke  - Neuro with recommendations to hold anticoagulation until MRI has resulted  - pending final neurology recommendations     Bacteremia  Assessment & Plan  MSSA  Patient with incidental blood cultures positive for gram positive cocci concerning for staph species  No clear source of infection identified, UA negative and CXR nonrevealing  ID following, recommendations appreciated  Continue cefazolin    Ischemic cardiomyopathy- (present on admission)  Assessment & Plan  -Per cardiology notes. Status post AICD 09/2021  -Last echo 05/2021 with a EF of 15-20%  -Continue metoprolol and digoxin. Not on ACEI/ARB due to intolerance per cardiology notes  -Patient is to be on spironolactone per cardiology note 09/2021 however says is not taking it  -Med rec fluids bumetanide, patient states he is not on any diuretic  - no active volume overload at this time     Orthostatic hypotension- (present on admission)  Assessment & Plan  -Reported history of orthostatic hypotension per notes, currently  complaining of dizziness upon change of position consistent with orthostatic hypotension  -check orthostatic vitals   -Would be difficult to keep patient hydrated due to history of HFrEF    Essential hypertension- (present on admission)  Assessment & Plan  -Continue metoprolol    History of stroke- old right parietal/occipital- (present on admission)  Assessment & Plan  Continue aspirin. Patient not on statin due to history of statin intolerance    CAD (coronary artery disease)- (present on admission)  Assessment & Plan  Continue aspirin. Patient not on statin due to history of statin intolerance  Goal directed medical therapy as tolerated  No active chest pain at this time     Type 2 diabetes mellitus without complication, with long-term current use of insulin (HCC)- (present on admission)  Assessment & Plan  -Continue long-acting insulin  -Insulin sliding scale and hypoglycemia protocol while in hospital    Paroxysmal A-fib (HCC)- (present on admission)  Assessment & Plan  -Rate controlled, not in RVR  -Continue metoprolol, digoxin   - apixaban currently on hold by neurology     Benign prostatic hyperplasia without lower urinary tract symptoms- (present on admission)  Assessment & Plan  Continue finasteride       VTE prophylaxis: therapeutic anticoagulation with apixaban    I have performed a physical exam and reviewed and updated ROS and Plan today (10/26/2021). In review of yesterday's note (10/25/2021), there are no changes except as documented above.

## 2021-10-27 LAB
ALBUMIN SERPL BCP-MCNC: 2.8 G/DL (ref 3.2–4.9)
ALBUMIN/GLOB SERPL: 0.7 G/DL
ALP SERPL-CCNC: 63 U/L (ref 30–99)
ALT SERPL-CCNC: 15 U/L (ref 2–50)
ANION GAP SERPL CALC-SCNC: 8 MMOL/L (ref 7–16)
AST SERPL-CCNC: 24 U/L (ref 12–45)
BACTERIA BLD CULT: ABNORMAL
BILIRUB SERPL-MCNC: 0.5 MG/DL (ref 0.1–1.5)
BUN SERPL-MCNC: 14 MG/DL (ref 8–22)
CALCIUM SERPL-MCNC: 8.3 MG/DL (ref 8.4–10.2)
CHLORIDE SERPL-SCNC: 103 MMOL/L (ref 96–112)
CO2 SERPL-SCNC: 22 MMOL/L (ref 20–33)
CREAT SERPL-MCNC: 0.74 MG/DL (ref 0.5–1.4)
EKG IMPRESSION: NORMAL
ERYTHROCYTE [DISTWIDTH] IN BLOOD BY AUTOMATED COUNT: 69.4 FL (ref 35.9–50)
GLOBULIN SER CALC-MCNC: 4.2 G/DL (ref 1.9–3.5)
GLUCOSE BLD-MCNC: 155 MG/DL (ref 65–99)
GLUCOSE BLD-MCNC: 186 MG/DL (ref 65–99)
GLUCOSE BLD-MCNC: 207 MG/DL (ref 65–99)
GLUCOSE SERPL-MCNC: 147 MG/DL (ref 65–99)
HCT VFR BLD AUTO: 44.9 % (ref 42–52)
HGB BLD-MCNC: 14.5 G/DL (ref 14–18)
MCH RBC QN AUTO: 36.1 PG (ref 27–33)
MCHC RBC AUTO-ENTMCNC: 32.3 G/DL (ref 33.7–35.3)
MCV RBC AUTO: 111.7 FL (ref 81.4–97.8)
PLATELET # BLD AUTO: 106 K/UL (ref 164–446)
PMV BLD AUTO: 11.8 FL (ref 9–12.9)
POTASSIUM SERPL-SCNC: 3.6 MMOL/L (ref 3.6–5.5)
PROT SERPL-MCNC: 7 G/DL (ref 6–8.2)
RBC # BLD AUTO: 4.02 M/UL (ref 4.7–6.1)
SIGNIFICANT IND 70042: ABNORMAL
SIGNIFICANT IND 70042: ABNORMAL
SITE SITE: ABNORMAL
SITE SITE: ABNORMAL
SODIUM SERPL-SCNC: 133 MMOL/L (ref 135–145)
SOURCE SOURCE: ABNORMAL
SOURCE SOURCE: ABNORMAL
TROPONIN T SERPL-MCNC: 19 NG/L (ref 6–19)
WBC # BLD AUTO: 4.9 K/UL (ref 4.8–10.8)

## 2021-10-27 PROCEDURE — 770001 HCHG ROOM/CARE - MED/SURG/GYN PRIV*

## 2021-10-27 PROCEDURE — 97165 OT EVAL LOW COMPLEX 30 MIN: CPT

## 2021-10-27 PROCEDURE — 84484 ASSAY OF TROPONIN QUANT: CPT

## 2021-10-27 PROCEDURE — 93005 ELECTROCARDIOGRAM TRACING: CPT | Performed by: INTERNAL MEDICINE

## 2021-10-27 PROCEDURE — 97161 PT EVAL LOW COMPLEX 20 MIN: CPT

## 2021-10-27 PROCEDURE — A9270 NON-COVERED ITEM OR SERVICE: HCPCS | Performed by: INTERNAL MEDICINE

## 2021-10-27 PROCEDURE — 82962 GLUCOSE BLOOD TEST: CPT

## 2021-10-27 PROCEDURE — 85027 COMPLETE CBC AUTOMATED: CPT

## 2021-10-27 PROCEDURE — A9270 NON-COVERED ITEM OR SERVICE: HCPCS | Performed by: STUDENT IN AN ORGANIZED HEALTH CARE EDUCATION/TRAINING PROGRAM

## 2021-10-27 PROCEDURE — 700111 HCHG RX REV CODE 636 W/ 250 OVERRIDE (IP): Performed by: INTERNAL MEDICINE

## 2021-10-27 PROCEDURE — 700102 HCHG RX REV CODE 250 W/ 637 OVERRIDE(OP): Performed by: INTERNAL MEDICINE

## 2021-10-27 PROCEDURE — 700111 HCHG RX REV CODE 636 W/ 250 OVERRIDE (IP): Performed by: STUDENT IN AN ORGANIZED HEALTH CARE EDUCATION/TRAINING PROGRAM

## 2021-10-27 PROCEDURE — 80053 COMPREHEN METABOLIC PANEL: CPT

## 2021-10-27 PROCEDURE — 99232 SBSQ HOSP IP/OBS MODERATE 35: CPT | Performed by: STUDENT IN AN ORGANIZED HEALTH CARE EDUCATION/TRAINING PROGRAM

## 2021-10-27 PROCEDURE — 93010 ELECTROCARDIOGRAM REPORT: CPT | Performed by: INTERNAL MEDICINE

## 2021-10-27 PROCEDURE — 36415 COLL VENOUS BLD VENIPUNCTURE: CPT

## 2021-10-27 PROCEDURE — 99232 SBSQ HOSP IP/OBS MODERATE 35: CPT | Performed by: INTERNAL MEDICINE

## 2021-10-27 PROCEDURE — 700102 HCHG RX REV CODE 250 W/ 637 OVERRIDE(OP): Performed by: STUDENT IN AN ORGANIZED HEALTH CARE EDUCATION/TRAINING PROGRAM

## 2021-10-27 PROCEDURE — 700105 HCHG RX REV CODE 258: Performed by: STUDENT IN AN ORGANIZED HEALTH CARE EDUCATION/TRAINING PROGRAM

## 2021-10-27 RX ADMIN — INSULIN HUMAN 3 UNITS: 100 INJECTION, SOLUTION PARENTERAL at 21:06

## 2021-10-27 RX ADMIN — CEFAZOLIN 2 G: 1 INJECTION, POWDER, FOR SOLUTION INTRAVENOUS at 04:52

## 2021-10-27 RX ADMIN — DIGOXIN 125 MCG: 125 TABLET ORAL at 17:34

## 2021-10-27 RX ADMIN — CEFAZOLIN 2 G: 1 INJECTION, POWDER, FOR SOLUTION INTRAVENOUS at 21:18

## 2021-10-27 RX ADMIN — SENNOSIDES AND DOCUSATE SODIUM 2 TABLET: 8.6; 5 TABLET ORAL at 17:34

## 2021-10-27 RX ADMIN — SODIUM CHLORIDE: 9 INJECTION, SOLUTION INTRAVENOUS at 17:39

## 2021-10-27 RX ADMIN — Medication 5 MG: at 21:10

## 2021-10-27 RX ADMIN — ASPIRIN 81 MG: 81 TABLET, COATED ORAL at 05:04

## 2021-10-27 RX ADMIN — GUAIFENESIN 200 MG: 100 SOLUTION ORAL at 19:11

## 2021-10-27 RX ADMIN — FINASTERIDE 5 MG: 5 TABLET, FILM COATED ORAL at 05:05

## 2021-10-27 RX ADMIN — ACETAMINOPHEN 650 MG: 325 TABLET ORAL at 05:05

## 2021-10-27 RX ADMIN — LORAZEPAM 0.5 MG: 2 INJECTION INTRAMUSCULAR; INTRAVENOUS at 04:55

## 2021-10-27 RX ADMIN — METOPROLOL SUCCINATE 25 MG: 25 TABLET, EXTENDED RELEASE ORAL at 05:05

## 2021-10-27 RX ADMIN — APIXABAN 5 MG: 5 TABLET, FILM COATED ORAL at 17:34

## 2021-10-27 RX ADMIN — ONDANSETRON 4 MG: 2 INJECTION INTRAMUSCULAR; INTRAVENOUS at 21:34

## 2021-10-27 RX ADMIN — CEFAZOLIN 2 G: 1 INJECTION, POWDER, FOR SOLUTION INTRAVENOUS at 16:22

## 2021-10-27 RX ADMIN — INSULIN HUMAN 2 UNITS: 100 INJECTION, SOLUTION PARENTERAL at 11:12

## 2021-10-27 ASSESSMENT — ENCOUNTER SYMPTOMS
SHORTNESS OF BREATH: 0
FEVER: 0
VOMITING: 0
NAUSEA: 0
FOCAL WEAKNESS: 1

## 2021-10-27 ASSESSMENT — COGNITIVE AND FUNCTIONAL STATUS - GENERAL
TURNING FROM BACK TO SIDE WHILE IN FLAT BAD: A LITTLE
MOBILITY SCORE: 18
MOVING TO AND FROM BED TO CHAIR: A LITTLE
SUGGESTED CMS G CODE MODIFIER DAILY ACTIVITY: CK
DRESSING REGULAR LOWER BODY CLOTHING: A LITTLE
CLIMB 3 TO 5 STEPS WITH RAILING: A LITTLE
SUGGESTED CMS G CODE MODIFIER MOBILITY: CK
EATING MEALS: A LITTLE
DAILY ACTIVITIY SCORE: 17
PERSONAL GROOMING: A LITTLE
STANDING UP FROM CHAIR USING ARMS: A LITTLE
TOILETING: A LITTLE
WALKING IN HOSPITAL ROOM: A LITTLE
MOVING FROM LYING ON BACK TO SITTING ON SIDE OF FLAT BED: A LITTLE
HELP NEEDED FOR BATHING: A LOT
DRESSING REGULAR UPPER BODY CLOTHING: A LITTLE

## 2021-10-27 ASSESSMENT — GAIT ASSESSMENTS
GAIT LEVEL OF ASSIST: MINIMAL ASSIST
DISTANCE (FEET): 175
DEVIATION: INCREASED BASE OF SUPPORT;SHUFFLED GAIT;BRADYKINETIC

## 2021-10-27 ASSESSMENT — ACTIVITIES OF DAILY LIVING (ADL): TOILETING: INDEPENDENT

## 2021-10-27 ASSESSMENT — PAIN DESCRIPTION - PAIN TYPE
TYPE: ACUTE PAIN
TYPE: ACUTE PAIN

## 2021-10-27 NOTE — DISCHARGE PLANNING
Anticipated Discharge Disposition: SNF    Action: LSW attempted to meet with pt to discuss SNF choice. Pt states he is tired and has not heard the results of his test. Pt requested this LSW come back at a later time to discuss SNF choice.    Barriers to Discharge: placement    Plan: LSW to follow up with pt for choice.

## 2021-10-27 NOTE — PROGRESS NOTES
Infectious Disease Progress Note    Author: Pema Asif M.D. Date & Time of service: 10/27/2021  12:03 PM    Chief Complaint:  MSSA sepsis    Interval History:  71 y.o. diabetic male admitted 10/24/2021 for weakness, dizziness, palpitations.+ AICD, Afib on apixaban, BPH, CVA found to have new CVA and above  10/26 AF WBC more alert today-still has LE weakness and can't walk  10/ AF WBC 4.9 repeat blood cxs +staph No new complaints    Labs Reviewed, Medications Reviewed and Radiology Reviewed.    Review of Systems:  Review of Systems   Constitutional: Positive for malaise/fatigue. Negative for fever.   Respiratory: Negative for shortness of breath.    Gastrointestinal: Negative for nausea and vomiting.   Skin: Negative for itching and rash.   Neurological: Positive for focal weakness.   All other systems reviewed and are negative.      Hemodynamics:  Temp (24hrs), Av.7 °C (98 °F), Min:36.3 °C (97.4 °F), Max:37 °C (98.6 °F)  Temperature: 36.3 °C (97.4 °F)  Pulse  Av.2  Min: 62  Max: 103   Blood Pressure : 106/55       Physical Exam:  Physical Exam  Vitals and nursing note reviewed.   Constitutional:       General: He is not in acute distress.     Appearance: He is not ill-appearing, toxic-appearing or diaphoretic.   HENT:      Nose: No rhinorrhea.      Mouth/Throat:      Pharynx: No oropharyngeal exudate or posterior oropharyngeal erythema.   Eyes:      Extraocular Movements: Extraocular movements intact.      Pupils: Pupils are equal, round, and reactive to light.   Cardiovascular:      Rate and Rhythm: Normal rate. Rhythm irregular.      Heart sounds: Murmur heard.        Comments: AICD nontender  Pulmonary:      Effort: Pulmonary effort is normal. No respiratory distress.   Abdominal:      General: There is no distension.      Palpations: Abdomen is soft.      Tenderness: There is no abdominal tenderness.   Musculoskeletal:      Cervical back: Neck supple. No tenderness.      Right lower leg: No  edema.      Left lower leg: No edema.   Skin:     Coloration: Skin is not jaundiced.      Findings: Bruising present.   Neurological:      Mental Status: He is alert.      Sensory: No sensory deficit.      Motor: Weakness present.   Psychiatric:         Mood and Affect: Mood normal.         Behavior: Behavior normal.         Meds:    Current Facility-Administered Medications:   •  acetaminophen  •  ceFAZolin  •  NS  •  senna-docusate **AND** polyethylene glycol/lytes **AND** magnesium hydroxide **AND** bisacodyl  •  enalaprilat  •  labetalol  •  insulin regular **AND** POC blood glucose manual result **AND** NOTIFY MD and PharmD **AND** glucose **AND** dextrose 50%  •  apixaban  •  aspirin EC  •  digoxin  •  finasteride  •  metoprolol SR  •  insulin glargine  •  melatonin  •  ondansetron  •  meclizine  •  LORazepam    Labs:  Recent Labs     10/24/21  1719 10/26/21  0246 10/27/21  0205   WBC 8.5 7.5 4.9   RBC 4.55* 4.13* 4.02*   HEMOGLOBIN 16.4 15.0 14.5   HEMATOCRIT 50.0 45.8 44.9   .9* 110.9* 111.7*   MCH 36.0* 36.3* 36.1*   RDW 69.2* 68.9* 69.4*   PLATELETCT 189 112* 106*   MPV 10.9 11.7 11.8   NEUTSPOLYS 85.90*  --   --    LYMPHOCYTES 4.90*  --   --    MONOCYTES 6.60  --   --    EOSINOPHILS 1.10  --   --    BASOPHILS 0.90  --   --    RBCMORPHOLO Present  --   --      Recent Labs     10/25/21  0433 10/26/21  0246 10/27/21  0205   SODIUM 135 136 133*   POTASSIUM 4.0 3.6 3.6   CHLORIDE 101 103 103   CO2 20 19* 22   GLUCOSE 153* 134* 147*   BUN 11 15 14     Recent Labs     10/24/21  1719 10/24/21  1719 10/25/21  0433 10/26/21  0246 10/27/21  0205   ALBUMIN 4.0  --   --   --  2.8*   TBILIRUBIN 0.7  --   --   --  0.5   ALKPHOSPHAT 65  --   --   --  63   TOTPROTEIN 8.7*  --   --   --  7.0   ALTSGPT 16  --   --   --  15   ASTSGOT 19  --   --   --  24   CREATININE 0.96   < > 0.90 0.80 0.74    < > = values in this interval not displayed.       Imaging:  CT-CTA HEAD WITH & W/O-POST PROCESS    Result Date:  10/25/2021  10/24/2021 9:03 PM HISTORY/REASON FOR EXAM:  Dizziness, dehydration or hypotension; vertigo new onset, vasculopath TECHNIQUE/EXAM DESCRIPTION: CT angiogram of the [place] without and with contrast.  Initial precontrast images were obtained of the head from the skull base through the vertex.  Postcontrast images were obtained of the [place] following the power injection of nonionic contrast at 5.0 mL/sec. Thin-section helical images were obtained with overlapping reconstruction interval. Coronal and sagittal multiplanar volume reformats were generated.  3D angiographic images were reviewed on PACS.  Maximum intensity projection (MIP) images were generated and reviewed. 80 mL of Omnipaque 350 nonionic contrast was injected intravenously. Low dose optimization technique was utilized for this CT exam including automated exposure control and adjustment of the mA and/or kV according to patient size. COMPARISON:  August 9, 2018. FINDINGS: The posterior circulation shows the distal vertebral arteries to be patent. Hypoplastic distal left vertebral artery is seen. Atherosclerotic changes in the distal right vertebral artery are seen, resulting in less than 50% stenosis The vertebrobasilar confluence is intact. The basilar artery is patent. Right proximal P2 occlusion is seen which reconstitutes distally. Persistent fetal morphology of the left posterior cerebral artery is seen. Atherosclerotic changes are seen of the bilateral intracranial internal carotid arteries resulting in less than 50% stenosis, otherwise the anterior circulation shows no stenotic or occlusive lesion. No aneurysm is evident about the [place]. Mild diffuse parenchymal volume loss is seen. There is mild periventricular and subcortical white matter low-attenuation changes. Low-density changes in the right parietal occipital lobe are seen compatible with encephalomalacia. 3D angiographic/MIP images of the vasculature  confirm the vascular findings as described above.     1.  Right proximal P2 occlusion, reconstitutes distally 2.  Changes of atrophy with nonspecific white matter changes, most commonly associated with small vessel ischemia. These findings were discussed with the patient's clinician, Dr. Farooq, on 10/24/2021 10:12 PM.    CT-CTA NECK WITH & W/O-POST PROCESSING    Result Date: 10/24/2021    10/24/2021 9:03 PM HISTORY/REASON FOR EXAM:  Vertigo new onset, vasculopath TECHNIQUE/EXAM DESCRIPTION:  CT angiogram of the neck with contrast. Postcontrast images were obtained of the neck from the great vessels through the skull base following the power injection of nonionic contrast at 5.0 mL/sec. Thin-section helical images were obtained with overlapping reconstruction interval. Coronal and oblique multiplanar volume reformats were generated. Cervical internal carotid artery percent stenosis is calculated using the standard method according to the NASCET criteria wherein a segment of uniform caliber mid or distal cervical internal carotid is used as the reference denominator. 3D angiographic images were reviewed on PACS.  Maximum intensity projection (MIP) images were generated and reviewed 80 mL of Omnipaque 350 nonionic contrast was injected intravenously. Low dose optimization technique was utilized for this CT exam including automated exposure control and adjustment of the mA and/or kV according to patient size. COMPARISON: August 9, 2018 FINDINGS: 1.4 cm peripherally calcified left thyroid cyst is seen. Aortic arch: bovine type branching pattern. There is atherosclerotic plaque of the aorta. Right common carotid artery: Patent Right internal carotid artery: Atherosclerotic plaque without significant stenosis (less than 50%). Left common carotid artery is patent. Left internal carotid artery: Atherosclerotic plaque without significant stenosis (less than 50%). The right vertebral artery is patent without dissection or  "stenosis. The left vertebral artery is patent without dissection or stenosis. Vertebrobasilar confluence: The vertebrobasilar confluence appears normal. Lung apices are clear The soft tissues of the neck are within normal limits. 3D angiographic/MIP images of the vasculature confirm the vascular findings as described above.     1.  No high-grade stenosis, occlusion, or aneurysm appreciated.     DX-CHEST-PORTABLE (1 VIEW)    Result Date: 10/24/2021  10/24/2021 5:30 PM HISTORY/REASON FOR EXAM:  Chest Pain TECHNIQUE/EXAM DESCRIPTION AND NUMBER OF VIEWS: Single portable view of the chest. COMPARISON:  7/29/2021, 5/28/2021 FINDINGS: Left bipolar transvenous pacing device remains in place. The cardiac silhouette is within normal limits. No infiltrates or consolidations are noted. No pleural effusion is noted.     No acute cardiac or pulmonary abnormality is noted.      Micro:  Results     Procedure Component Value Units Date/Time    BLOOD CULTURE [[id number]]  (Abnormal) Collected: 10/24/21 1840    Order Status: Completed Specimen: Blood from Peripheral Updated: 10/27/21 1119     Significant Indicator POS     Source BLD     Site PERIPHERAL     Culture Result Growth detected by Bactec instrument. 10/25/2021  08:14  Blood culture testing and Gram stain, if indicated, are  performed at [facility], [address] [place] [address].  Positive blood cultures are  sent to [facility], [address] [place] [address], for organism identification and  susceptibility testing.        Staphylococcus aureus  See previous culture for sensitivity report.      Narrative:      2 of 2 blood culture x2  Sites order. Per Hospital Policy:  Only change Specimen Src: to \"Line\" if specified by physician  order.  Left AC    BLOOD CULTURE [[id number]]  (Abnormal)  (Susceptibility) Collected: 10/24/21 1845    Order Status: Completed Specimen: Blood from Peripheral Updated: 10/27/21 1119     Significant " "Indicator POS     Source BLD     Site PERIPHERAL     Culture Result Growth detected by Bactec instrument. 10/25/2021  07:57  Staphylococcus aureus (methicillin sensitive)  detected by PCR.        Staphylococcus aureus    Narrative:      CALL  Russell  [facility] tel. [phone],  CALLED  [facility] tel. [phone] 10/25/2021, 13:01, RB PERF. RESULTS CALLED  TO:Michael pharm  1 of 2 for Blood Culture x 2 sites order. Per Hospital  Policy: Only change Specimen Src: to \"Line\" if specified by  physician order.  Left AC    Susceptibility     Staphylococcus aureus (1)     Antibiotic Interpretation Microscan Method Status    Azithromycin Sensitive <=2 mcg/mL JAVI Final    Clindamycin Sensitive <=0.25 mcg/mL JAVI Final    Cefazolin Sensitive <=8 mcg/mL JAVI Final    Cefepime Sensitive <=4 mcg/mL JAVI Final    Ceftaroline Sensitive <=0.5 mcg/mL JAVI Final    Daptomycin Sensitive <=0.5 mcg/mL JAVI Final    Ampicillin/sulbactam Sensitive <=8/4 mcg/mL JAVI Final    Erythromycin Sensitive <=0.25 mcg/mL JAVI Final    Vancomycin Sensitive 1 mcg/mL JAVI Final    Oxacillin Sensitive 1 mcg/mL JAVI Final    Pip/Tazobactam Sensitive <=8 mcg/mL JAVI Final    Trimeth/Sulfa Sensitive <=0.5/9.5 mcg/mL JAVI Final    Tetracycline Sensitive <=4 mcg/mL JAVI Final                   BLOOD CULTURE [[id number]]  (Abnormal) Collected: 10/26/21 0807    Order Status: Completed Specimen: Blood from Peripheral Updated: 10/27/21 0843     Significant Indicator POS     Source BLD     Site PERIPHERAL     Culture Result Growth detected by Bactec instrument. 10/27/2021  08:43  Gram Stain: Gram positive cocci: Possible Staphylococcus sp.      Narrative:      Per Hospital Policy: Only change Specimen Src: to \"Line\" if  specified by physician order.  Right Wrist    BLOOD CULTURE [[id number]]  (Abnormal) Collected: 10/26/21 0807    Order Status: Completed Specimen: Blood from Peripheral Updated: 10/27/21 0510     Significant Indicator POS     Source BLD     Site PERIPHERAL     Culture Result " "Growth detected by Bactec instrument. 10/27/2021  05:08  Gram Stain: Gram positive cocci: Possible Staphylococcus sp.  Blood culture testing and Gram stain, if indicated, are  performed at [facility], [address] [place] [address].  Positive blood cultures are  sent to [facility], [address] [place] [address], for organism identification and  susceptibility testing.      Narrative:      CALL  Rusesll  [facility] tel. [phone],  CALLED  [facility] tel. [phone] 10/27/2021, 05:09, RB PERF. RESULTS CALLED  TO:[id number], RN  Per Hospital Policy: Only change Specimen Src: to \"Line\" if  specified by physician order.  Right Hand    URINALYSIS (UA) [[id number]]  (Abnormal) Collected: 10/24/21 1840    Order Status: Completed Specimen: Urine, Clean Catch Updated: 10/24/21 1958     Color Yellow     Character Clear     Specific Gravity 1.015     Ph 5.5     Glucose >=1000 mg/dL      Ketones Trace mg/dL      Protein Negative mg/dL      Bilirubin Negative     Nitrite Negative     Leukocyte Esterase Negative     Occult Blood Moderate     Micro Urine Req Microscopic          Assessment:  Active Hospital Problems    Diagnosis    • *Vertigo [R42]    • Bacteremia [R78.81]    • Ischemic cardiomyopathy [I25.5]    • Orthostatic hypotension [I95.1]    • Essential hypertension [I10]    • History of stroke- old right parietal/occipital [Z86.73]    • CAD (coronary artery disease) [I25.10]    • Type 2 diabetes mellitus without complication, with long-term current use of insulin (HCC) [E11.9, Z79.4]    • Paroxysmal A-fib (HCC) [I48.0]    • Benign prostatic hyperplasia without lower urinary tract symptoms [N40.0]        Plan:  MSSA sepsis  AICD in place  Afebrile  No leukocytosis  BCxs + MSSA 10/24 and 10/26  Repeat Bcxs every 48 hours until neg  PICC or midline when Bcxs neg 48 hours  Recommend PAMELA as he has AICD  Given mult +blood cxs AICD is likely infected and will require explantation  Continue " cefazolin  Anticipate 6 weeks IV abx from date of negative blood cxs if no new complications    CVA  Mult CVA by MRI, chronic  No bleed    Immunosuppressed  PV  Lupus      Diabetes  Keep BS under 150 to help control current infection      Discussed with RN

## 2021-10-27 NOTE — PROGRESS NOTES
"Patient very agitated, anxious because of room mate keeps yelling, and talking to himself. Patient states, \"I am going to walk out of here if I can. Please get me another room, I can't take it anymore.\" Lorazepam 0.5mg IV given for agitation and irritability. Patient has moist productive cough, Lung sounds diminished this morning.  "

## 2021-10-27 NOTE — PROGRESS NOTES
Patient A&Ox4, denies N/V, SOB at this time. Patient had MRI today. Bed in lowest, locked position. Call light, belongings in reach. Safety precautions in place.     COVID-19 surge in effect.

## 2021-10-27 NOTE — PROGRESS NOTES
Hospital Medicine Daily Progress Note    Date of Service  10/27/2021    Chief Complaint  Francesco Schulte is a 71 y.o. male admitted 10/24/2021 with dizziness    Hospital Course  A 71-year-old man with ho DM, HLD, HFrEF, s/p AICD, Afib (AC with apixaban, BPH, CVA presented with dizziness, lower extremity weakness for 2 days.   CTA showed rigth proximal P2 occlusion. Neurology recommended MRI brain. Blood cultures positive for MSSA. ID recommended continue cefazolin, anticipate 4 weeks of antibiotics.    Interval Problem Update  10/26: Patient reports dizziness on standing, nausea, no strength on b/l legs. Afebrile, hemodynamically stable. On room air. Patient had some pancreatic mass, and supposed to get MRI of pancreas in 2 weeks. MRCP ordered. No stool x2 days. Blood cultures from 10/24 positive for S.aureus    10/27: Patient had chest pain at night, pleuritic. Troponin negative. ECG showed sinus rhythm, incomplete LLB, LVH. Afebrile, hemodynamically stable. Na 133. Tired. No chest pain in the morning. Now able to move legs.      I have personally seen and examined the patient at bedside. I discussed the plan of care with patient, bedside RN, charge RN,  and pharmacy.    Consultants/Specialty  infectious disease    Code Status  Full Code    Disposition  Patient is not medically cleared.   Anticipate discharge to to home with close outpatient follow-up.  I have placed the appropriate orders for post-discharge needs.    Review of Systems  Constitutional: Positive for malaise/fatigue. Negative for chills and fever.   HENT: Negative.    Eyes: Negative.    Respiratory: Negative for cough and shortness of breath.    Cardiovascular: Negative for chest pain and leg swelling.   Gastrointestinal: Positive for nausea. Negative for abdominal pain, diarrhea and vomiting.   Genitourinary: Negative for dysuria.   Musculoskeletal: Negative for myalgias.   Skin: Negative for rash.   Neurological: Positive for  dizziness and weakness.     Physical Exam  Temp:  [36.3 °C (97.4 °F)-37 °C (98.6 °F)] 36.3 °C (97.4 °F)  Pulse:  [60-72] 60  Resp:  [17-20] 17  BP: (106-142)/(55-69) 112/57  SpO2:  [90 %-96 %] 96 %    Physical Exam  Vitals and nursing note reviewed.   Constitutional:       Appearance: He is ill-appearing.   HENT:      Head: Normocephalic and atraumatic.      Mouth/Throat:      Mouth: Mucous membranes are moist.      Pharynx: Oropharynx is clear.   Eyes:      Pupils: Pupils are equal, round, and reactive to light.   Cardiovascular:      Rate and Rhythm: Normal rate and regular rhythm.   Pulmonary:      Effort: Pulmonary effort is normal. No respiratory distress.      Breath sounds: No wheezing or rales.   Abdominal:      General: Abdomen is flat. Bowel sounds are normal. There is no distension.      Tenderness: There is no abdominal tenderness. There is no guarding.   Musculoskeletal:         General: Normal range of motion.      Cervical back: Normal range of motion.   Skin:     General: Skin is warm.   Neurological:      General: No focal deficit present.      Mental Status: He is alert and oriented to person, place, and time.     Fluids    Intake/Output Summary (Last 24 hours) at 10/27/2021 1724  Last data filed at 10/27/2021 0700  Gross per 24 hour   Intake --   Output 1000 ml   Net -1000 ml       Laboratory  Recent Labs     10/26/21  0246 10/27/21  0205   WBC 7.5 4.9   RBC 4.13* 4.02*   HEMOGLOBIN 15.0 14.5   HEMATOCRIT 45.8 44.9   .9* 111.7*   MCH 36.3* 36.1*   MCHC 32.8* 32.3*   RDW 68.9* 69.4*   PLATELETCT 112* 106*   MPV 11.7 11.8     Recent Labs     10/25/21  0433 10/26/21  0246 10/27/21  0205   SODIUM 135 136 133*   POTASSIUM 4.0 3.6 3.6   CHLORIDE 101 103 103   CO2 20 19* 22   GLUCOSE 153* 134* 147*   BUN 11 15 14   CREATININE 0.90 0.80 0.74   CALCIUM 9.3 8.5 8.3*                   Imaging  MR-BRAIN-W/O   Final Result      1.  Mild cerebral atrophy.   2.  Chronic encephalomalacic changes right  lateral occipital lobe, right parietal lobe, right posterior frontal lobe most consistent with old cerebral infarcts.   3.  Small area of chronic encephalomalacic change at the right anterior parasagittal frontal lobe which has developed since the prior exam. This may represent an old infarction versus chronic sequela of posttraumatic brain contusion.   4.  Small area of chronic encephalomalacic change at the inferior surface of the left temporal lobe which was not present on the prior exam from 2018. Lesion location and morphology is most suggestive of chronic posttraumatic brain contusion.   5.  No evidence of acute infarction, acute hemorrhage, or mass lesion.      [id number]-X/O   Final Result      1.  There is cholelithiasis without pericholecystic, gallbladder wall thickening or biliary dilatation.   2.  There is no choledocholithiasis.   3.  There is a potential 2 cm cystic lesion in the pancreatic tail region/splenic hilum.      CT-CTA NECK WITH & W/O-POST PROCESSING   Final Result         1.  No high-grade stenosis, occlusion, or aneurysm appreciated.         CT-CTA HEAD WITH & W/O-POST PROCESS   Final Result         1.  Right proximal P2 occlusion, reconstitutes distally   2.  Changes of atrophy with nonspecific white matter changes, most commonly associated with small vessel ischemia.      These findings were discussed with the patient's clinician, Dr. Farooq, on 10/24/2021 10:12 PM.      DX-CHEST-PORTABLE (1 VIEW)   Final Result      No acute cardiac or pulmonary abnormality is noted.      EC-PAMELA W/O CONT    (Results Pending)        Assessment/Plan  * Vertigo- (present on admission)  Assessment & Plan  -pt states he has a hx of vertigo which had resolved, complaining of vertigo now, dizziness like room spinning with rapid movement of head to one side  -pt with hx of CVA and CAD. Bilateral nystagmus on exam concerning for central cause   -[place]-Hallpike Maneuver for further eval not done since pt too  nauseous   -meclizine and zofran for symptom control  -CT head and neck shows right PCA occlusion however unclear if acute or chronic as patient has had previous posterior circulation stroke  MRI brain showed chronic multiple infarctions  Neurology recommended to resume anticoagulation for stroke prevention    Bacteremia  Assessment & Plan  MSSA  Patient with incidental blood cultures positive for gram positive cocci concerning for staph species  No clear source of infection identified, UA negative and CXR nonrevealing  ID following, recommendations appreciated  Continue cefazolin    Ischemic cardiomyopathy- (present on admission)  Assessment & Plan  -Per cardiology notes. Status post AICD 09/2021  -Last echo 05/2021 with a EF of 15-20%  -Continue metoprolol and digoxin. Not on ACEI/ARB due to intolerance per cardiology notes  -Patient is to be on spironolactone per cardiology note 09/2021 however says is not taking it  -Med rec fluids bumetanide, patient states he is not on any diuretic  - no active volume overload at this time     Orthostatic hypotension- (present on admission)  Assessment & Plan  -Reported history of orthostatic hypotension per notes, currently complaining of dizziness upon change of position consistent with orthostatic hypotension  -check orthostatic vitals   -Would be difficult to keep patient hydrated due to history of HFrEF    Essential hypertension- (present on admission)  Assessment & Plan  -Continue metoprolol    History of stroke- old right parietal/occipital- (present on admission)  Assessment & Plan  Continue aspirin. Patient not on statin due to history of statin intolerance    CAD (coronary artery disease)- (present on admission)  Assessment & Plan  Continue aspirin. Patient not on statin due to history of statin intolerance  Goal directed medical therapy as tolerated  No active chest pain at this time     Type 2 diabetes mellitus without complication, with long-term current use of  insulin (HCC)- (present on admission)  Assessment & Plan  -Continue long-acting insulin  -Insulin sliding scale and hypoglycemia protocol while in hospital    Paroxysmal A-fib (HCC)- (present on admission)  Assessment & Plan  -Rate controlled, not in RVR  -Continue metoprolol, digoxin   -resumed apixaban per neurology on 10/27    Benign prostatic hyperplasia without lower urinary tract symptoms- (present on admission)  Assessment & Plan  Continue finasteride       VTE prophylaxis: therapeutic anticoagulation with apixiban    I have performed a physical exam and reviewed and updated ROS and Plan today (10/27/2021). In review of yesterday's note (10/26/2021), there are no changes except as documented above.

## 2021-10-27 NOTE — PROGRESS NOTES
COVID- 19 surge in effect.    Received report from JOSLYN Schneider.  Assumed Pt. Care  Pt. A&Ox3. Unable to states date   No sign of cardiac and respiratory distress.  Patient was able to walk to the commode this morning to have a BM.   Pain is 2/10 on head. Decline any pain intervention.  Pt. Is comfortable in bed resting.  Bed at lowest position.  Bed alarm is on. Call light and personal belonging within reach. Encourage to call for assistance.

## 2021-10-27 NOTE — CARE PLAN
The patient is Watcher - Medium risk of patient condition declining or worsening    Shift Goals  Clinical Goals: Free from falls and injury  Patient Goals: Rest and sleep  Family Goals: No family present    Progress made toward(s) clinical / shift goals:  yes    Patient is not progressing towards the following goals:

## 2021-10-27 NOTE — THERAPY
Physical Therapy   Initial Evaluation     Patient Name: Francesco Schulte  Age:  71 y.o., Sex:  male  Medical Record #: [id number]  Today's Date: 10/27/2021     Precautions  Precautions: Fall Risk;Swallow Precautions ( See Comments)    Assessment  Patient is 71 y.o. male with a diagnosis of MSSA sepsis.  Pt is presenting with impaired balance and generalized weakness, poor insight and judgement and is not at baseline for mobility. Pt is refusing to use a FWW or SPC even though is off balance and clearly needs assist to ambulate. Recommend pt use a FWW at home for safety at this time and have HHPT.  If pt continues to refuse an assistive device then is a high fall risk and will need 1 person assist whenever ambulating one DC home.        Plan    Recommend Physical Therapy 3 times per week until therapy goals are met for the following treatments:  Bed Mobility, Gait Training, Neuro Re-Education / Balance, Therapeutic Activities and Therapeutic Exercises    DC Equipment Recommendations: Front-Wheel Walker  Discharge Recommendations: Recommend home health for continued physical therapy services        10/27/21 1136   Prior Living Situation   Housing / Facility [address]   Steps Into Home 0   Steps In Home 0   Equipment Owned Single Point Cane   Lives with - Patient's Self Care Capacity Spouse;Adult Children   Comments Pt lives with spouse and dtr   Prior Level of Functional Mobility   Bed Mobility Independent   Transfer Status Independent   Ambulation Independent   Assistive Devices Used None  (states uses a SPC outside)   Cognition    Level of Consciousness Alert   Comments Pt is oriented x4 but overall impaired judgement and inisght into current deficits   Passive ROM Lower Body   Passive ROM Lower Body Not Tested   Active ROM Lower Body    Active ROM Lower Body  WDL   Strength Lower Body   Lower Body Strength  X   Comments grossly 4/5   Balance Assessment   Sitting Balance (Static) Fair   Sitting Balance  (Dynamic) Fair   Standing Balance (Static) Fair -   Standing Balance (Dynamic) Poor +   Weight Shift Sitting Fair   Weight Shift Standing Fair   Gait Analysis   Gait Level Of Assist Minimal Assist   Assistive Device None   Deviation Increased Base Of Support;Shuffled Gait;Bradykinetic   Comments Pt is refusing to use a FWW or SPC, doesn't think he needs it even though he recognizes that he feels weak from being in bed for a few days   Bed Mobility    Supine to Sit Supervised   Sit to Supine Supervised   Functional Mobility   Sit to Stand Minimal Assist   Short Term Goals    Short Term Goal # 1 Pt will be able to ambulate 200 ft without AD Svetlana in 6 visits so can DC home safely.   Short Term Goal # 2 Pt will be able to perform sit <> stand and transfer Svetlana in 6 visits so can DC home safely.

## 2021-10-27 NOTE — PROGRESS NOTES
"Pt complaining of new onset of chest pain on right side of chest radiating over half of chest. Charge notified, \"Bridget\" RN. Vital signs stable /69, P 68, Room air sats 92%. Patient unable to take deep breath because of pain. Dr David notified per phone, orders for stat EKG, and Trop received.   "

## 2021-10-28 ENCOUNTER — HOME HEALTH ADMISSION (OUTPATIENT)
Dept: HOME HEALTH SERVICES | Facility: HOME HEALTHCARE | Age: 71
End: 2021-10-28
Payer: MEDICARE

## 2021-10-28 LAB
ANION GAP SERPL CALC-SCNC: 9 MMOL/L (ref 7–16)
BACTERIA BLD CULT: ABNORMAL
BUN SERPL-MCNC: 14 MG/DL (ref 8–22)
CALCIUM SERPL-MCNC: 8.4 MG/DL (ref 8.4–10.2)
CHLORIDE SERPL-SCNC: 103 MMOL/L (ref 96–112)
CO2 SERPL-SCNC: 23 MMOL/L (ref 20–33)
CREAT SERPL-MCNC: 0.72 MG/DL (ref 0.5–1.4)
ERYTHROCYTE [DISTWIDTH] IN BLOOD BY AUTOMATED COUNT: 64.3 FL (ref 35.9–50)
GLUCOSE BLD-MCNC: 115 MG/DL (ref 65–99)
GLUCOSE BLD-MCNC: 128 MG/DL (ref 65–99)
GLUCOSE BLD-MCNC: 167 MG/DL (ref 65–99)
GLUCOSE BLD-MCNC: 168 MG/DL (ref 65–99)
GLUCOSE SERPL-MCNC: 136 MG/DL (ref 65–99)
HCT VFR BLD AUTO: 43.5 % (ref 42–52)
HGB BLD-MCNC: 14.7 G/DL (ref 14–18)
MCH RBC QN AUTO: 36.6 PG (ref 27–33)
MCHC RBC AUTO-ENTMCNC: 33.8 G/DL (ref 33.7–35.3)
MCV RBC AUTO: 108.2 FL (ref 81.4–97.8)
PLATELET # BLD AUTO: 110 K/UL (ref 164–446)
PMV BLD AUTO: 12.4 FL (ref 9–12.9)
POTASSIUM SERPL-SCNC: 3.6 MMOL/L (ref 3.6–5.5)
RBC # BLD AUTO: 4.02 M/UL (ref 4.7–6.1)
SIGNIFICANT IND 70042: ABNORMAL
SIGNIFICANT IND 70042: ABNORMAL
SITE SITE: ABNORMAL
SITE SITE: ABNORMAL
SODIUM SERPL-SCNC: 135 MMOL/L (ref 135–145)
SOURCE SOURCE: ABNORMAL
SOURCE SOURCE: ABNORMAL
WBC # BLD AUTO: 4.7 K/UL (ref 4.8–10.8)

## 2021-10-28 PROCEDURE — 700102 HCHG RX REV CODE 250 W/ 637 OVERRIDE(OP): Performed by: STUDENT IN AN ORGANIZED HEALTH CARE EDUCATION/TRAINING PROGRAM

## 2021-10-28 PROCEDURE — 700105 HCHG RX REV CODE 258: Performed by: STUDENT IN AN ORGANIZED HEALTH CARE EDUCATION/TRAINING PROGRAM

## 2021-10-28 PROCEDURE — 770001 HCHG ROOM/CARE - MED/SURG/GYN PRIV*

## 2021-10-28 PROCEDURE — A9270 NON-COVERED ITEM OR SERVICE: HCPCS | Performed by: STUDENT IN AN ORGANIZED HEALTH CARE EDUCATION/TRAINING PROGRAM

## 2021-10-28 PROCEDURE — 87040 BLOOD CULTURE FOR BACTERIA: CPT | Mod: 91

## 2021-10-28 PROCEDURE — 85027 COMPLETE CBC AUTOMATED: CPT

## 2021-10-28 PROCEDURE — 99232 SBSQ HOSP IP/OBS MODERATE 35: CPT | Performed by: STUDENT IN AN ORGANIZED HEALTH CARE EDUCATION/TRAINING PROGRAM

## 2021-10-28 PROCEDURE — 82962 GLUCOSE BLOOD TEST: CPT

## 2021-10-28 PROCEDURE — 700111 HCHG RX REV CODE 636 W/ 250 OVERRIDE (IP): Performed by: INTERNAL MEDICINE

## 2021-10-28 PROCEDURE — 80048 BASIC METABOLIC PNL TOTAL CA: CPT

## 2021-10-28 PROCEDURE — 99232 SBSQ HOSP IP/OBS MODERATE 35: CPT | Performed by: INTERNAL MEDICINE

## 2021-10-28 PROCEDURE — 36415 COLL VENOUS BLD VENIPUNCTURE: CPT

## 2021-10-28 RX ADMIN — APIXABAN 5 MG: 5 TABLET, FILM COATED ORAL at 18:01

## 2021-10-28 RX ADMIN — CEFAZOLIN 2 G: 1 INJECTION, POWDER, FOR SOLUTION INTRAVENOUS at 21:20

## 2021-10-28 RX ADMIN — ASPIRIN 81 MG: 81 TABLET, COATED ORAL at 05:45

## 2021-10-28 RX ADMIN — SODIUM CHLORIDE: 9 INJECTION, SOLUTION INTRAVENOUS at 21:37

## 2021-10-28 RX ADMIN — APIXABAN 5 MG: 5 TABLET, FILM COATED ORAL at 05:46

## 2021-10-28 RX ADMIN — CEFAZOLIN 2 G: 1 INJECTION, POWDER, FOR SOLUTION INTRAVENOUS at 05:47

## 2021-10-28 RX ADMIN — SODIUM CHLORIDE: 9 INJECTION, SOLUTION INTRAVENOUS at 09:52

## 2021-10-28 RX ADMIN — FINASTERIDE 5 MG: 5 TABLET, FILM COATED ORAL at 05:46

## 2021-10-28 RX ADMIN — GUAIFENESIN 200 MG: 100 SOLUTION ORAL at 18:02

## 2021-10-28 RX ADMIN — GUAIFENESIN 200 MG: 100 SOLUTION ORAL at 02:20

## 2021-10-28 RX ADMIN — CEFAZOLIN 2 G: 1 INJECTION, POWDER, FOR SOLUTION INTRAVENOUS at 15:23

## 2021-10-28 RX ADMIN — GUAIFENESIN 200 MG: 100 SOLUTION ORAL at 08:10

## 2021-10-28 RX ADMIN — METOPROLOL SUCCINATE 25 MG: 25 TABLET, EXTENDED RELEASE ORAL at 05:45

## 2021-10-28 RX ADMIN — Medication 5 MG: at 21:20

## 2021-10-28 RX ADMIN — SENNOSIDES AND DOCUSATE SODIUM 2 TABLET: 8.6; 5 TABLET ORAL at 05:46

## 2021-10-28 RX ADMIN — DIGOXIN 125 MCG: 125 TABLET ORAL at 18:01

## 2021-10-28 ASSESSMENT — ENCOUNTER SYMPTOMS
SHORTNESS OF BREATH: 0
FEVER: 0

## 2021-10-28 ASSESSMENT — PAIN DESCRIPTION - PAIN TYPE
TYPE: ACUTE PAIN
TYPE: ACUTE PAIN

## 2021-10-28 NOTE — DISCHARGE PLANNING
Received Choice form at [id number]  Agency/Facility Name: [facility]   Referral sent per Choice form @ [id number]

## 2021-10-28 NOTE — PROGRESS NOTES
Hospital Medicine Daily Progress Note    Date of Service  10/28/2021    Chief Complaint  Francesco Schulte is a 71 y.o. male admitted 10/24/2021 with dizziness    Hospital Course  A 71-year-old man with ho DM, HLD, HFrEF, s/p AICD, Afib (AC with apixaban, BPH, CVA presented with dizziness, lower extremity weakness for 2 days.   CTA showed rigth proximal P2 occlusion. Neurology recommended MRI brain. Blood cultures positive for MSSA. ID recommended continue cefazolin, anticipate 4 weeks of antibiotics.    Interval Problem Update  10/26: Patient reports dizziness on standing, nausea, no strength on b/l legs. Afebrile, hemodynamically stable. On room air. Patient had some pancreatic mass, and supposed to get MRI of pancreas in 2 weeks. MRCP ordered. No stool x2 days. Blood cultures from 10/24 positive for S.aureus     10/27: Patient had chest pain at night, pleuritic. Troponin negative. ECG showed sinus rhythm, incomplete LLB, LVH. Afebrile, hemodynamically stable. Na 133. Tired. No chest pain in the morning. Now able to move legs.    10/28: PT, OT recommended HH. Afebrile, hemodynamically stable.  MRI spine ordered to r/o spinal infection. ID following, recommendations appreciated.    I have personally seen and examined the patient at bedside. I discussed the plan of care with patient, bedside RN, charge RN,  and pharmacy.    Consultants/Specialty  infectious disease    Code Status  Full Code    Disposition  Patient is not medically cleared.   Anticipate discharge to to home with close outpatient follow-up.  I have placed the appropriate orders for post-discharge needs.    Review of Systems  Constitutional: Positive for malaise/fatigue. Negative for chills and fever.   HENT: Negative.    Eyes: Negative.    Respiratory: Negative for cough and shortness of breath.    Cardiovascular: Negative for chest pain and leg swelling.   Gastrointestinal: Positive for nausea. Negative for abdominal  pain, diarrhea and vomiting.   Genitourinary: Negative for dysuria.   Musculoskeletal: Negative for myalgias.   Skin: Negative for rash.   Neurological: Positive for dizziness and weakness.     Physical Exam  Temp:  [36.3 °C (97.4 °F)-36.8 °C (98.3 °F)] 36.6 °C (97.9 °F)  Pulse:  [60-65] 60  Resp:  [17-18] 18  BP: (112-150)/(54-67) 150/67  SpO2:  [95 %-97 %] 96 %    Physical Exam  Vitals and nursing note reviewed.   Constitutional:       Appearance: He is ill-appearing.   HENT:      Head: Normocephalic and atraumatic.      Mouth/Throat:      Mouth: Mucous membranes are moist.      Pharynx: Oropharynx is clear.   Eyes:      Pupils: Pupils are equal, round, and reactive to light.   Cardiovascular:      Rate and Rhythm: Normal rate and regular rhythm.   Pulmonary:      Effort: Pulmonary effort is normal. No respiratory distress.      Breath sounds: No wheezing or rales.   Abdominal:      General: Abdomen is flat. Bowel sounds are normal. There is no distension.      Tenderness: There is no abdominal tenderness. There is no guarding.   Musculoskeletal:         General: Normal range of motion.      Cervical back: Normal range of motion.   Skin:     General: Skin is warm.   Neurological:      General: No focal deficit present.      Mental Status: He is alert and oriented to person, place, and time.     Fluids    Intake/Output Summary (Last 24 hours) at 10/28/2021 1226  Last data filed at 10/28/2021 0856  Gross per 24 hour   Intake 6800.52 ml   Output 1000 ml   Net 5800.52 ml       Laboratory  Recent Labs     10/26/21  0246 10/27/21  0205 10/28/21  0157   WBC 7.5 4.9 4.7*   RBC 4.13* 4.02* 4.02*   HEMOGLOBIN 15.0 14.5 14.7   HEMATOCRIT 45.8 44.9 43.5   .9* 111.7* 108.2*   MCH 36.3* 36.1* 36.6*   MCHC 32.8* 32.3* 33.8   RDW 68.9* 69.4* 64.3*   PLATELETCT 112* 106* 110*   MPV 11.7 11.8 12.4     Recent Labs     10/26/21  0246 10/27/21  0205 10/28/21  0157   SODIUM 136 133* 135   POTASSIUM 3.6 3.6 3.6   CHLORIDE 103 103  103   CO2 19* 22 23   GLUCOSE 134* 147* 136*   BUN 15 14 14   CREATININE 0.80 0.74 0.72   CALCIUM 8.5 8.3* 8.4                   Imaging  MR-BRAIN-W/O   Final Result      1.  Mild cerebral atrophy.   2.  Chronic encephalomalacic changes right lateral occipital lobe, right parietal lobe, right posterior frontal lobe most consistent with old cerebral infarcts.   3.  Small area of chronic encephalomalacic change at the right anterior parasagittal frontal lobe which has developed since the prior exam. This may represent an old infarction versus chronic sequela of posttraumatic brain contusion.   4.  Small area of chronic encephalomalacic change at the inferior surface of the left temporal lobe which was not present on the prior exam from 2018. Lesion location and morphology is most suggestive of chronic posttraumatic brain contusion.   5.  No evidence of acute infarction, acute hemorrhage, or mass lesion.      [id number]/O   Final Result      1.  There is cholelithiasis without pericholecystic, gallbladder wall thickening or biliary dilatation.   2.  There is no choledocholithiasis.   3.  There is a potential 2 cm cystic lesion in the pancreatic tail region/splenic hilum.      CT-CTA NECK WITH & W/O-POST PROCESSING   Final Result         1.  No high-grade stenosis, occlusion, or aneurysm appreciated.         CT-CTA HEAD WITH & W/O-POST PROCESS   Final Result         1.  Right proximal P2 occlusion, reconstitutes distally   2.  Changes of atrophy with nonspecific white matter changes, most commonly associated with small vessel ischemia.      These findings were discussed with the patient's clinician, Dr. Farooq, on 10/24/2021 10:12 PM.      DX-CHEST-PORTABLE (1 VIEW)   Final Result      No acute cardiac or pulmonary abnormality is noted.      EC-PAMELA W/O CONT    (Results Pending)   MR-CERVICAL SPINE-WITH & W/O    (Results Pending)   MR-THORACIC SPINE-WITH & W/O    (Results Pending)   MR-LUMBAR SPINE-WITH & W/O     (Results Pending)        Assessment/Plan  * Vertigo- (present on admission)  Assessment & Plan  -pt states he has a hx of vertigo which had resolved, complaining of vertigo now, dizziness like room spinning with rapid movement of head to one side  -pt with hx of CVA and CAD. Bilateral nystagmus on exam concerning for central cause   -[place]-Hallpike Maneuver for further eval not done since pt too nauseous   -meclizine and zofran for symptom control  -CT head and neck shows right PCA occlusion however unclear if acute or chronic as patient has had previous posterior circulation stroke  MRI brain showed chronic multiple infarctions  Neurology recommended to resume anticoagulation for stroke prevention    Bacteremia  Assessment & Plan  MSSA  Patient with incidental blood cultures positive for gram positive cocci concerning for staph species  No clear source of infection identified, UA negative and CXR nonrevealing  ID following, recommendations appreciated  Continue cefazolin    Ischemic cardiomyopathy- (present on admission)  Assessment & Plan  -Per cardiology notes. Status post AICD 09/2021  -Last echo 05/2021 with a EF of 15-20%  -Continue metoprolol and digoxin. Not on ACEI/ARB due to intolerance per cardiology notes  -Patient is to be on spironolactone per cardiology note 09/2021 however says is not taking it  -Med rec fluids bumetanide, patient states he is not on any diuretic  - no active volume overload at this time     Orthostatic hypotension- (present on admission)  Assessment & Plan  -Reported history of orthostatic hypotension per notes, currently complaining of dizziness upon change of position consistent with orthostatic hypotension  -check orthostatic vitals   -Would be difficult to keep patient hydrated due to history of HFrEF    Essential hypertension- (present on admission)  Assessment & Plan  -Continue metoprolol    History of stroke- old right parietal/occipital- (present on admission)  Assessment &  Plan  Continue aspirin. Patient not on statin due to history of statin intolerance    CAD (coronary artery disease)- (present on admission)  Assessment & Plan  Continue aspirin. Patient not on statin due to history of statin intolerance  Goal directed medical therapy as tolerated  No active chest pain at this time     Type 2 diabetes mellitus without complication, with long-term current use of insulin (HCC)- (present on admission)  Assessment & Plan  -Continue long-acting insulin  -Insulin sliding scale and hypoglycemia protocol while in hospital    Paroxysmal A-fib (HCC)- (present on admission)  Assessment & Plan  -Rate controlled, not in RVR  -Continue metoprolol, digoxin   -resumed apixaban per neurology on 10/27    Benign prostatic hyperplasia without lower urinary tract symptoms- (present on admission)  Assessment & Plan  Continue finasteride       VTE prophylaxis: therapeutic anticoagulation with apixiban    I have performed a physical exam and reviewed and updated ROS and Plan today (10/28/2021). In review of yesterday's note (10/27/2021), there are no changes except as documented above.

## 2021-10-28 NOTE — DISCHARGE PLANNING
Anticipated Discharge Disposition: HH with home infusion      Action/Information: TIFFANIE met with pt at bedside to discuss SNF, as pt was provided SNF choice form to review yesterday. Pt not agreeable to SNF at this time, stating he is a very active person at baseline and that he expects to return to his active lifestyle very soon. SW explained potential barriers/concerns, however, pt adamant about returning home. Per chart review, it appears that PT/OT is recommending HH upon discharge now, not SNF. SW discussed alternative options with pt. Pt agreeable to home health assistance. Pt is agreeable to [facility]. TIFFANIE requested order from MD for HH. TIFFANIE faxed choice form to [facility].     1889 Update: TIFFANIE was provided update that pt likely going to need 6 weeks of IV abx. Awaiting PICC placement and ID orders. TIFFANIE requested addended HH orders from MD to reflect PICC line care.      Barriers: medical clearance; home infusions; home health     Plan: HCM will continue to collaborate with pt/family, medical care team, and outpatient providers for ongoing discharge planning support and collaboration.

## 2021-10-28 NOTE — CARE PLAN
The patient is Stable - Low risk of patient condition declining or worsening    Shift Goals  Clinical Goals: Pt will rest  Patient Goals: Rest and sleep  Family Goals: No family present    Progress made toward(s) clinical / shift goals: Pt rested well through out the night    Patient is not progressing towards the following goals:        Problem: Pain - Standard  Goal: Alleviation of pain or a reduction in pain to the patient’s comfort goal  Outcome: Progressing     Problem: Fall Risk  Goal: Patient will remain free from falls  Outcome: Progressing

## 2021-10-28 NOTE — PROGRESS NOTES
Infectious Disease Progress Note    Author: Pema Asif M.D. Date & Time of service: 10/28/2021  11:19 AM    Chief Complaint:  MSSA sepsis    Interval History:  71 y.o. diabetic male admitted 10/24/2021 for weakness, dizziness, palpitations.+ AICD, Afib on apixaban, BPH, CVA found to have new CVA and above  10/26 AF WBC more alert today-still has LE weakness and can't walk  10/27 AF WBC 4.9 repeat blood cxs +staph No new complaints ECHO not done yet  10/28 AF sleeping soundly at time of rounds No echo yet    Labs Reviewed, Medications Reviewed and Radiology Reviewed.    Review of Systems:  Review of Systems   Unable to perform ROS: Other   Constitutional: Negative for fever.   Respiratory: Negative for shortness of breath.        Hemodynamics:  Temp (24hrs), Av.6 °C (97.8 °F), Min:36.3 °C (97.4 °F), Max:36.8 °C (98.3 °F)  Temperature: 36.6 °C (97.9 °F)  Pulse  Av  Min: 60  Max: 103   Blood Pressure : 150/67       Physical Exam:  Physical Exam  Vitals and nursing note reviewed.   Constitutional:       General: He is not in acute distress.     Appearance: He is not ill-appearing, toxic-appearing or diaphoretic.   HENT:      Nose: No rhinorrhea.   Eyes:      General:         Right eye: No discharge.         Left eye: No discharge.   Cardiovascular:      Rate and Rhythm: Normal rate. Rhythm irregular.      Heart sounds: Murmur heard.        Comments: AICD nontender  Pulmonary:      Effort: Pulmonary effort is normal. No respiratory distress.      Breath sounds: No stridor.   Abdominal:      General: There is no distension.      Palpations: Abdomen is soft.      Tenderness: There is no abdominal tenderness.   Musculoskeletal:      Cervical back: Neck supple. No tenderness.      Right lower leg: No edema.      Left lower leg: No edema.   Skin:     Coloration: Skin is not jaundiced.      Findings: Bruising present.   Neurological:      Mental Status: He is disoriented.      Sensory: No sensory deficit.          Meds:    Current Facility-Administered Medications:   •  guaiFENesin  •  acetaminophen  •  ceFAZolin  •  NS  •  senna-docusate **AND** polyethylene glycol/lytes **AND** magnesium hydroxide **AND** bisacodyl  •  enalaprilat  •  labetalol  •  insulin regular **AND** POC blood glucose manual result **AND** NOTIFY MD and PharmD **AND** glucose **AND** dextrose 50%  •  apixaban  •  aspirin EC  •  digoxin  •  finasteride  •  metoprolol SR  •  insulin glargine  •  melatonin  •  ondansetron  •  meclizine  •  LORazepam    Labs:  Recent Labs     10/26/21  0246 10/27/21  0205 10/28/21  0157   WBC 7.5 4.9 4.7*   RBC 4.13* 4.02* 4.02*   HEMOGLOBIN 15.0 14.5 14.7   HEMATOCRIT 45.8 44.9 43.5   .9* 111.7* 108.2*   MCH 36.3* 36.1* 36.6*   RDW 68.9* 69.4* 64.3*   PLATELETCT 112* 106* 110*   MPV 11.7 11.8 12.4     Recent Labs     10/26/21  0246 10/27/21  0205 10/28/21  0157   SODIUM 136 133* 135   POTASSIUM 3.6 3.6 3.6   CHLORIDE 103 103 103   CO2 19* 22 23   GLUCOSE 134* 147* 136*   BUN 15 14 14     Recent Labs     10/26/21  0246 10/27/21  0205 10/28/21  0157   ALBUMIN  --  2.8*  --    TBILIRUBIN  --  0.5  --    ALKPHOSPHAT  --  63  --    TOTPROTEIN  --  7.0  --    ALTSGPT  --  15  --    ASTSGOT  --  24  --    CREATININE 0.80 0.74 0.72       Imaging:  CT-CTA HEAD WITH & W/O-POST PROCESS    Result Date: 10/25/2021  10/24/2021 9:03 PM HISTORY/REASON FOR EXAM:  Dizziness, dehydration or hypotension; vertigo new onset, vasculopath TECHNIQUE/EXAM DESCRIPTION: CT angiogram of the [place] without and with contrast.  Initial precontrast images were obtained of the head from the skull base through the vertex.  Postcontrast images were obtained of the [place] following the power injection of nonionic contrast at 5.0 mL/sec. Thin-section helical images were obtained with overlapping reconstruction interval. Coronal and sagittal multiplanar volume reformats were generated.  3D angiographic images were reviewed on  PACS.  Maximum intensity projection (MIP) images were generated and reviewed. 80 mL of Omnipaque 350 nonionic contrast was injected intravenously. Low dose optimization technique was utilized for this CT exam including automated exposure control and adjustment of the mA and/or kV according to patient size. COMPARISON:  August 9, 2018. FINDINGS: The posterior circulation shows the distal vertebral arteries to be patent. Hypoplastic distal left vertebral artery is seen. Atherosclerotic changes in the distal right vertebral artery are seen, resulting in less than 50% stenosis The vertebrobasilar confluence is intact. The basilar artery is patent. Right proximal P2 occlusion is seen which reconstitutes distally. Persistent fetal morphology of the left posterior cerebral artery is seen. Atherosclerotic changes are seen of the bilateral intracranial internal carotid arteries resulting in less than 50% stenosis, otherwise the anterior circulation shows no stenotic or occlusive lesion. No aneurysm is evident about the [place]. Mild diffuse parenchymal volume loss is seen. There is mild periventricular and subcortical white matter low-attenuation changes. Low-density changes in the right parietal occipital lobe are seen compatible with encephalomalacia. 3D angiographic/MIP images of the vasculature confirm the vascular findings as described above.     1.  Right proximal P2 occlusion, reconstitutes distally 2.  Changes of atrophy with nonspecific white matter changes, most commonly associated with small vessel ischemia. These findings were discussed with the patient's clinician, Dr. Farooq, on 10/24/2021 10:12 PM.    CT-CTA NECK WITH & W/O-POST PROCESSING    Result Date: 10/24/2021    10/24/2021 9:03 PM HISTORY/REASON FOR EXAM:  Vertigo new onset, vasculopath TECHNIQUE/EXAM DESCRIPTION:  CT angiogram of the neck with contrast. Postcontrast images were obtained of the neck from the great vessels through the skull base  following the power injection of nonionic contrast at 5.0 mL/sec. Thin-section helical images were obtained with overlapping reconstruction interval. Coronal and oblique multiplanar volume reformats were generated. Cervical internal carotid artery percent stenosis is calculated using the standard method according to the NASCET criteria wherein a segment of uniform caliber mid or distal cervical internal carotid is used as the reference denominator. 3D angiographic images were reviewed on PACS.  Maximum intensity projection (MIP) images were generated and reviewed 80 mL of Omnipaque 350 nonionic contrast was injected intravenously. Low dose optimization technique was utilized for this CT exam including automated exposure control and adjustment of the mA and/or kV according to patient size. COMPARISON: August 9, 2018 FINDINGS: 1.4 cm peripherally calcified left thyroid cyst is seen. Aortic arch: bovine type branching pattern. There is atherosclerotic plaque of the aorta. Right common carotid artery: Patent Right internal carotid artery: Atherosclerotic plaque without significant stenosis (less than 50%). Left common carotid artery is patent. Left internal carotid artery: Atherosclerotic plaque without significant stenosis (less than 50%). The right vertebral artery is patent without dissection or stenosis. The left vertebral artery is patent without dissection or stenosis. Vertebrobasilar confluence: The vertebrobasilar confluence appears normal. Lung apices are clear The soft tissues of the neck are within normal limits. 3D angiographic/MIP images of the vasculature confirm the vascular findings as described above.     1.  No high-grade stenosis, occlusion, or aneurysm appreciated.     DX-CHEST-PORTABLE (1 VIEW)    Result Date: 10/24/2021  10/24/2021 5:30 PM HISTORY/REASON FOR EXAM:  Chest Pain TECHNIQUE/EXAM DESCRIPTION AND NUMBER OF VIEWS: Single portable view of the chest. COMPARISON:  7/29/2021, 5/28/2021  "FINDINGS: Left bipolar transvenous pacing device remains in place. The cardiac silhouette is within normal limits. No infiltrates or consolidations are noted. No pleural effusion is noted.     No acute cardiac or pulmonary abnormality is noted.      Micro:  Results     Procedure Component Value Units Date/Time    BLOOD CULTURE [[id number]]  (Abnormal) Collected: 10/26/21 0807    Order Status: Completed Specimen: Blood from Peripheral Updated: 10/28/21 1113     Significant Indicator POS     Source BLD     Site PERIPHERAL     Culture Result Growth detected by Bactec instrument. 10/27/2021  05:08      Staphylococcus aureus  See previous culture for sensitivity report.      Narrative:      CALL  Russell  [facility] tel. [phone],  CALLED  [facility] tel. [phone] 10/27/2021, 05:09, RB PERF. RESULTS CALLED  TO:[phone], RN  Per Hospital Policy: Only change Specimen Src: to \"Line\" if  specified by physician order.  Right Hand    BLOOD CULTURE [[id number]]  (Abnormal) Collected: 10/26/21 0807    Order Status: Completed Specimen: Blood from Peripheral Updated: 10/28/21 1109     Significant Indicator POS     Source BLD     Site PERIPHERAL     Culture Result Growth detected by Bactec instrument. 10/27/2021  08:43      Staphylococcus aureus  See previous culture for sensitivity report.      Narrative:      Per Hospital Policy: Only change Specimen Src: to \"Line\" if  specified by physician order.  Right Wrist    BLOOD CULTURE [[id number]] Collected: 10/28/21 0157    Order Status: Completed Specimen: Blood from Peripheral Updated: 10/28/21 0302    Narrative:      Per Hospital Policy: Only change Specimen Src: to \"Line\" if  specified by physician order.    BLOOD CULTURE [[id number]] Collected: 10/28/21 0157    Order Status: Completed Specimen: Blood from Peripheral Updated: 10/28/21 0302    Narrative:      Per Hospital Policy: Only change Specimen Src: to \"Line\" if  specified by physician order.    BLOOD CULTURE [[id number]]  (Abnormal) Collected: " "10/24/21 1840    Order Status: Completed Specimen: Blood from Peripheral Updated: 10/27/21 1119     Significant Indicator POS     Source BLD     Site PERIPHERAL     Culture Result Growth detected by Bactec instrument. 10/25/2021  08:14  Blood culture testing and Gram stain, if indicated, are  performed at [facility], [address] [place] [address].  Positive blood cultures are  sent to [facility], [address] [place] [address], for organism identification and  susceptibility testing.        Staphylococcus aureus  See previous culture for sensitivity report.      Narrative:      2 of 2 blood culture x2  Sites order. Per Hospital Policy:  Only change Specimen Src: to \"Line\" if specified by physician  order.  Left AC    BLOOD CULTURE [[id number]]  (Abnormal)  (Susceptibility) Collected: 10/24/21 1845    Order Status: Completed Specimen: Blood from Peripheral Updated: 10/27/21 1119     Significant Indicator POS     Source BLD     Site PERIPHERAL     Culture Result Growth detected by Bactec instrument. 10/25/2021  07:57  Staphylococcus aureus (methicillin sensitive)  detected by PCR.        Staphylococcus aureus    Narrative:      CALL  Russell  [facility] tel. [phone],  CALLED  [facility] tel. [phone] 10/25/2021, 13:01, RB PERF. RESULTS CALLED  TO:Michael pharm  1 of 2 for Blood Culture x 2 sites order. Per Hospital  Policy: Only change Specimen Src: to \"Line\" if specified by  physician order.  Left AC    Susceptibility     Staphylococcus aureus (1)     Antibiotic Interpretation Microscan Method Status    Azithromycin Sensitive <=2 mcg/mL JAVI Final    Clindamycin Sensitive <=0.25 mcg/mL JAVI Final    Cefazolin Sensitive <=8 mcg/mL JAVI Final    Cefepime Sensitive <=4 mcg/mL JAIV Final    Ceftaroline Sensitive <=0.5 mcg/mL JAVI Final    Daptomycin Sensitive <=0.5 mcg/mL JAVI Final    Ampicillin/sulbactam Sensitive <=8/4 mcg/mL JAVI Final    Erythromycin Sensitive <=0.25 mcg/mL JAVI Final "    Vancomycin Sensitive 1 mcg/mL JAVI Final    Oxacillin Sensitive 1 mcg/mL JAVI Final    Pip/Tazobactam Sensitive <=8 mcg/mL JAVI Final    Trimeth/Sulfa Sensitive <=0.5/9.5 mcg/mL JAVI Final    Tetracycline Sensitive <=4 mcg/mL JAVI Final                   URINALYSIS (UA) [[id number]]  (Abnormal) Collected: 10/24/21 1840    Order Status: Completed Specimen: Urine, Clean Catch Updated: 10/24/21 1958     Color Yellow     Character Clear     Specific Gravity 1.015     Ph 5.5     Glucose >=1000 mg/dL      Ketones Trace mg/dL      Protein Negative mg/dL      Bilirubin Negative     Nitrite Negative     Leukocyte Esterase Negative     Occult Blood Moderate     Micro Urine Req Microscopic          Assessment:  Active Hospital Problems    Diagnosis    • *Vertigo [R42]    • Bacteremia [R78.81]    • Ischemic cardiomyopathy [I25.5]    • Orthostatic hypotension [I95.1]    • Essential hypertension [I10]    • History of stroke- old right parietal/occipital [Z86.73]    • CAD (coronary artery disease) [I25.10]    • Type 2 diabetes mellitus without complication, with long-term current use of insulin (HCC) [E11.9, Z79.4]    • Paroxysmal A-fib (HCC) [I48.0]    • Benign prostatic hyperplasia without lower urinary tract symptoms [N40.0]        Plan:  MSSA sepsis  AICD in place Additional work  Afebrile  No leukocytosis  BCxs + MSSA 10/24 and 10/26  Repeat Bcxs every 48 hours until neg-repeat today  PICC or midline when Bcxs neg 48 hours  Recommend PAMELA as he has AICD  Given mult +blood cxs AICD is likely infected and will require explantation  Continue cefazolin  Anticipate 6 weeks IV abx from date of negative blood cxs if no new complications    CVA  Mult CVA by MRI, chronic  No bleed    Immunosuppressed  PV  Lupus      Diabetes  Keep BS under 150 to help control current infection  -207

## 2021-10-28 NOTE — PROGRESS NOTES
"\"Covid 19 surge in effect\"    Report received from day RN Pt is AAO x 4.Pt reports no pain .POC discussed.All needs met at this time.Bed in low position.Call light within reach.Rounding in place.   "

## 2021-10-28 NOTE — PROGRESS NOTES
COVID-19 surge in effect.    Received report from JOSLYN Gtz.  Assumed Pt. Care  Pt. A&Ox3 unable to state date.  No sign of cardiac and respiratory distress.  Pain is 0/10. Patient requested cough medicine during assessment. He said it helps with his cough and it open his airways to be able to breath better. Given Robitussin per physicians order.  Daughter Charla from [place] called and express her concern of his father going home. She said that he is living alone and very unsafe and he will tend to not comply with instructions. She said patient just recently had his license suspended and she knows that he will still try to drive again. Talk to the physician about what the daughter said and he said he will talk to . Daughter will also call the .  Pt. Is comfortable in bed resting.  Bed at lowest position.  Bed alarm is on. Call light and personal belonging within reach.   Encourage to call for assistance.

## 2021-10-28 NOTE — DOCUMENTATION QUERY
[facility]                                                                       Query Response Note      PATIENT:               MARZENA ROMEO  ACCT #:                  [id number]  MRN:                     [id number]  :                      1950  ADMIT DATE:       10/24/2021 5:09 PM  DISCH DATE:          RESPONDING  PROVIDER #:        [id number]           QUERY TEXT:    MSSA sepsis per ID note.  MSSA bacteremia per progress notes   Can a diagnosis be provided based on the clinical indicators documented in the medical record.       NOTE:  If an appropriate response is not listed below, please respond with a new note.      The patient's Clinical Indicators include:  - Findings:  Progress notes 10/25 - 10/27:  Bacteremia MSSA no clear source of infection identified, UA negative and CXR non revealing   - ID consult note 10/25/21:  MSSA sepsis, no leukocytosis, blood cultures +MSSA   - Admit labs:  WBC 8.5, lactic acid 1.6, immature granulocytes 0.50  - Admit vitals:  RR 20, HR 84, T 98.1, /95    - Treatments:  antibiotics, ID consult, repeat labs, cultures      - Risk factors:  immunosuppressed, lupus, CVA, AICD in place     Thank You,  Maggie Perry RN  Clinical Documentation   [email or web address]  Connect via [employer] Messenger  Options provided:   -- Bacteremia without sepsis POA   -- Bacteremia with sepsis POA(please document additional clinical indicators, SIRS criteria)   -- Unable to determine      Query created by: Maggie Perry on 10/27/2021 5:31 PM    RESPONSE TEXT:    Bacteremia without sepsis POA          Electronically signed by:  WILFRED SUAREZ MD 10/27/2021 6:09 PM

## 2021-10-28 NOTE — CARE PLAN
The patient is Stable - Low risk of patient condition declining or worsening    Shift Goals  Clinical Goals: Patient will walk at least 50 feet and will be free from falls within the shift  Patient Goals: Rest and sleep    Progress made toward(s) clinical / shift goals:  Patient walked with PT and OT at noon time. Patient refused using FWW. Pt states that patient has to be hand held assist when walking because he is unsteady on his feet.      Problem: Knowledge Deficit - Standard  Goal: Patient and family/care givers will demonstrate understanding of plan of care, disease process/condition, diagnostic tests and medications  Outcome: Progressing

## 2021-10-28 NOTE — FACE TO FACE
Face to Face Note  -  Durable Medical Equipment    Deb Peres M.D. - NPI: [id number]  I certify that this patient is under my care and that they have had a durable medical equipment(DME)face to face encounter by myself that meets the physician DME face-to-face encounter requirements with this patient on:    Date of encounter:   Patient:                    MRN:                       YOB: 2019  Francesco York Schulte  [id number]  1950     The encounter with the patient was in whole, or in part, for the following medical condition, which is the primary reason for durable medical equipment:  Other - b/l LE weakness    I certify that, based on my findings, the following durable medical equipment is medically necessary:  Walkers.    HOME O2 Saturation Measurements:(Values must be present for Home Oxygen orders)         ,     ,         My Clinical findings support the need for the above equipment due to:  Abnormal Gait    Supporting Symptoms: b/l LE weakness       ------------------------------------------------------------------------------------------------------------------    Face to Face Supporting Documentation - Home Health    The encounter with this patient was in whole or in part the primary reason for home health admission.    Date of encounter:   Patient:                    MRN:                       YOB: 2019  Francesco York Eris  [id number]  1950     Home health to see patient for:  Skilled Nursing care for assessment, interventions & education, Physical Therapy evaluation and treatment and Occupational therapy evaluation and treatment    Skilled need for:  Recent Deterioration of Health Status B/l LE weakness    Skilled nursing interventions to include:  Comment: assessment, education, intervention PICC line care, IV antibiotics    Homebound evidenced status by:  Needs the assistance of another person in order to leave the home. Leaving home  must require a considerable and taxing effort. There must exist a normal inability to leave the home.    Community Physician to provide follow up care: ALVINO Harman     Optional Interventions    Wound information & treatment:    Home Infusion Therapy orders:    Line/Drain/Airway:    I certify the face to face encounter for this home care referral meets the CMS requirements and the encounter/clinical assessment with the patient was, in whole, or in part, for the medical condition(s) listed above, which is the primary reason for home health care. Based on my clinical findings: the service(s) are medically necessary, support the need for home health care, and the homebound criteria are met.  I certify that this patient has had a face to face encounter by myself.  Deb Peres M.D. - NPI: [id number]    *Debility, frailty and advanced age in the absence of an acute deterioration or exacerbation of a condition do not qualify a patient for home health.

## 2021-10-28 NOTE — THERAPY
"Occupational Therapy   Initial Evaluation     Patient Name: Francesco Schulte  Age:  71 y.o., Sex:  male  Medical Record #: [id number]  Today's Date: 10/27/2021     Precautions  Precautions: Fall Risk, Swallow Precautions ( See Comments)  Comments: high fall risk; refuses AD during ambulation    Assessment    Patient is 71 y.o. male with a diagnosis of MSSA bacteremia, ischemic cardiomyopathy. Additional factors influencing patient status / progress: CVA, CAD, BPH, HTN, paroxysmal Afib, DM2. He lives with his spouse and adult dtr, and reports to be pretty active at baseline, I with ADLs/IADLs, ambulates without AD, except for use of SPC during community ambulation. He states he also still drives. Pt presents with apparent significant improvement of functional abilities and strength in a span of 2 days, able to ambulate with some residual weakness and balance deficits. Despite these, pt refused to utilize SPC or FWW during functional txfs, and ambulation. He appears to have decreased insight and awareness of current deficits, required further cueing and encouragement to maximize safety while in this setting and once medically able to return home. He will continue to benefit from acute OT while in house, as well as home health OT services for home safety eval and DME recs upon dc home.    Plan    Recommend Occupational Therapy 3 times per week until therapy goals are met for the following treatments:  Adaptive Equipment, Cognitive Skill Development, Community Re-integration, Neuro Re-Education / Balance, Self Care/Activities of Daily Living, Therapeutic Activities and Therapeutic Exercises.    DC Equipment Recommendations: Unable to determine at this time  Discharge Recommendations: Recommend home health for continued occupational therapy services     Subjective    \"I have a different opinion about walkers.\"     Objective       10/27/21 1137   Prior Living Situation   Prior Services None   Housing / Facility 1 " [facility]   Steps Into Home 0   Steps In Home 0   Bathroom Set up Walk In Shower   Equipment Owned Front-Wheel Walker;Single Point Cane   Lives with - Patient's Self Care Capacity Spouse;Adult Children   Comments Pt lives with spouse and adult dtr   Prior Level of ADL Function   Self Feeding Independent   Grooming / Hygiene Independent   Bathing Independent   Dressing Independent   Toileting Independent   Prior Level of IADL Function   Medication Management Independent   Laundry Independent   Kitchen Mobility Independent   Finances Independent   Home Management Independent   Shopping Independent   Prior Level Of Mobility Independent Without Device in Home;Independent With Device in Community   Driving / Transportation Driving Independent   Occupation (Pre-Hospital Vocational) Retired Due To Age   Comments Pt uses SPC but only during outdoor ambulation   History of Falls   History of Falls No   Precautions   Precautions Fall Risk;Swallow Precautions ( See Comments)   Comments high fall risk; refuses AD during ambulation   Pain   Pain Scales 0 to 10 Scale    Intervention Declines   Pain 0 - 10 Group   Pain Rating Scale (NPRS) 0   Therapist Pain Assessment Post Activity Pain Same as Prior to Activity   Cognition    Cognition / Consciousness X   Level of Consciousness Alert   Safety Awareness Impaired   Comments Pt is A+Ox4, cooperative, but has impaired insight and safety awareness of CLOF   Active ROM Upper Body   Active ROM Upper Body  WDL   Strength Upper Body   Upper Body Strength  WDL   Balance Assessment   Sitting Balance (Static) Fair   Sitting Balance (Dynamic) Fair   Standing Balance (Static) Fair -   Standing Balance (Dynamic) Poor +   Weight Shift Sitting Fair   Weight Shift Standing Fair   Comments with HHA   Bed Mobility    Supine to Sit Supervised   Sit to Supine Supervised   Scooting Supervised   Comments HOB elevated, rails not utilized   ADL Assessment   Grooming Standing;Supervision   Upper Body  Dressing Minimal Assist   Lower Body Dressing Minimal Assist   How much help from another person does the patient currently need...   Putting on and taking off regular lower body clothing? 3   Bathing (including washing, rinsing, and drying)? 2   Toileting, which includes using a toilet, bedpan, or urinal? 3   Putting on and taking off regular upper body clothing? 3   Taking care of personal grooming such as brushing teeth? 3   Eating meals? 3   6 Clicks Daily Activity Score 17   Functional Mobility   Sit to Stand   (CGA)   Bed, Chair, Wheelchair Transfer   (CGA)   Transfer Method Stand Step   Mobility in room and hallways without AD   Distance (Feet) 175   # of Times Distance was Traveled 1   Activity Tolerance   Sitting in Chair NT   Sitting Edge of Bed 10 mins   Standing 10 mins   Comments limited by balance deficits   Short Term Goals   Short Term Goal # 1 Pt will complete ADL txfs using LRAD at spv level   Short Term Goal # 2 Pt will complete FB dressing at spv level   Education Group   Education Provided Role of Occupational Therapist;Home Safety;Transfers   Role of Occupational Therapist Patient Response Patient;Acceptance;Explanation   Home Safety Patient Response Patient;Acceptance;Explanation;Reinforcement Needed   Transfers Patient Response Patient;Acceptance;Explanation;Reinforcement Needed   Anticipated Discharge Equipment and Recommendations   DC Equipment Recommendations Unable to determine at this time   Discharge Recommendations Recommend home health for continued occupational therapy services   Interdisciplinary Plan of Care Collaboration   IDT Collaboration with  Nursing;Physical Therapist   Patient Position at End of Therapy In Bed;Bed Alarm On;Call Light within Reach;Tray Table within Reach   Collaboration Comments RN aware of OT session and dc recs   Session Information   Date / Session Number  10/27 #1 (1/3, 11/2)   Priority 2

## 2021-10-28 NOTE — DISCHARGE PLANNING
Per Dr. Peres's notes patient is currently on IV cefazolin. Please advise if patient will be going home with IV ABX or oral ABX. If IV then we will need the referral updated to include iv abx and picc line care are well as orders sent to the infusion company.     Thank you

## 2021-10-29 ENCOUNTER — APPOINTMENT (OUTPATIENT)
Dept: RADIOLOGY | Facility: MEDICAL CENTER | Age: 71
DRG: 872 | End: 2021-10-29
Attending: STUDENT IN AN ORGANIZED HEALTH CARE EDUCATION/TRAINING PROGRAM
Payer: MEDICARE

## 2021-10-29 ENCOUNTER — APPOINTMENT (OUTPATIENT)
Dept: RADIOLOGY | Facility: MEDICAL CENTER | Age: 71
DRG: 872 | End: 2021-10-29
Attending: HOSPITALIST
Payer: MEDICARE

## 2021-10-29 LAB
ALBUMIN SERPL BCP-MCNC: 2.8 G/DL (ref 3.2–4.9)
ALBUMIN/GLOB SERPL: 0.6 G/DL
ALP SERPL-CCNC: 111 U/L (ref 30–99)
ALT SERPL-CCNC: 10 U/L (ref 2–50)
ANION GAP SERPL CALC-SCNC: 14 MMOL/L (ref 7–16)
AST SERPL-CCNC: 16 U/L (ref 12–45)
BILIRUB SERPL-MCNC: 0.6 MG/DL (ref 0.1–1.5)
BUN SERPL-MCNC: 13 MG/DL (ref 8–22)
CALCIUM SERPL-MCNC: 8.6 MG/DL (ref 8.4–10.2)
CHLORIDE SERPL-SCNC: 102 MMOL/L (ref 96–112)
CO2 SERPL-SCNC: 19 MMOL/L (ref 20–33)
CREAT SERPL-MCNC: 0.96 MG/DL (ref 0.5–1.4)
GLOBULIN SER CALC-MCNC: 4.5 G/DL (ref 1.9–3.5)
GLUCOSE BLD-MCNC: 154 MG/DL (ref 65–99)
GLUCOSE BLD-MCNC: 168 MG/DL (ref 65–99)
GLUCOSE BLD-MCNC: 251 MG/DL (ref 65–99)
GLUCOSE SERPL-MCNC: 224 MG/DL (ref 65–99)
MAGNESIUM SERPL-MCNC: 1.9 MG/DL (ref 1.5–2.5)
POTASSIUM SERPL-SCNC: 3.7 MMOL/L (ref 3.6–5.5)
PROT SERPL-MCNC: 7.3 G/DL (ref 6–8.2)
SODIUM SERPL-SCNC: 135 MMOL/L (ref 135–145)
TROPONIN T SERPL-MCNC: 13 NG/L (ref 6–19)
TROPONIN T SERPL-MCNC: 16 NG/L (ref 6–19)

## 2021-10-29 PROCEDURE — 97530 THERAPEUTIC ACTIVITIES: CPT

## 2021-10-29 PROCEDURE — 700102 HCHG RX REV CODE 250 W/ 637 OVERRIDE(OP)

## 2021-10-29 PROCEDURE — A9270 NON-COVERED ITEM OR SERVICE: HCPCS | Performed by: STUDENT IN AN ORGANIZED HEALTH CARE EDUCATION/TRAINING PROGRAM

## 2021-10-29 PROCEDURE — 99232 SBSQ HOSP IP/OBS MODERATE 35: CPT | Performed by: INTERNAL MEDICINE

## 2021-10-29 PROCEDURE — A9270 NON-COVERED ITEM OR SERVICE: HCPCS

## 2021-10-29 PROCEDURE — 93005 ELECTROCARDIOGRAM TRACING: CPT

## 2021-10-29 PROCEDURE — 700101 HCHG RX REV CODE 250: Performed by: HOSPITALIST

## 2021-10-29 PROCEDURE — 99232 SBSQ HOSP IP/OBS MODERATE 35: CPT | Performed by: STUDENT IN AN ORGANIZED HEALTH CARE EDUCATION/TRAINING PROGRAM

## 2021-10-29 PROCEDURE — 97112 NEUROMUSCULAR REEDUCATION: CPT

## 2021-10-29 PROCEDURE — 36415 COLL VENOUS BLD VENIPUNCTURE: CPT

## 2021-10-29 PROCEDURE — 83735 ASSAY OF MAGNESIUM: CPT

## 2021-10-29 PROCEDURE — 700102 HCHG RX REV CODE 250 W/ 637 OVERRIDE(OP): Performed by: STUDENT IN AN ORGANIZED HEALTH CARE EDUCATION/TRAINING PROGRAM

## 2021-10-29 PROCEDURE — 700111 HCHG RX REV CODE 636 W/ 250 OVERRIDE (IP): Performed by: INTERNAL MEDICINE

## 2021-10-29 PROCEDURE — 92523 SPEECH SOUND LANG COMPREHEN: CPT

## 2021-10-29 PROCEDURE — 71045 X-RAY EXAM CHEST 1 VIEW: CPT

## 2021-10-29 PROCEDURE — 97116 GAIT TRAINING THERAPY: CPT

## 2021-10-29 PROCEDURE — 97535 SELF CARE MNGMENT TRAINING: CPT

## 2021-10-29 PROCEDURE — 82962 GLUCOSE BLOOD TEST: CPT | Mod: 91

## 2021-10-29 PROCEDURE — 80053 COMPREHEN METABOLIC PANEL: CPT

## 2021-10-29 PROCEDURE — 770001 HCHG ROOM/CARE - MED/SURG/GYN PRIV*

## 2021-10-29 PROCEDURE — 84484 ASSAY OF TROPONIN QUANT: CPT

## 2021-10-29 PROCEDURE — 700111 HCHG RX REV CODE 636 W/ 250 OVERRIDE (IP): Performed by: STUDENT IN AN ORGANIZED HEALTH CARE EDUCATION/TRAINING PROGRAM

## 2021-10-29 RX ORDER — ASPIRIN 325 MG
325 TABLET ORAL DAILY
Status: DISCONTINUED | OUTPATIENT
Start: 2021-10-29 | End: 2021-10-30

## 2021-10-29 RX ORDER — HYDROMORPHONE HYDROCHLORIDE 1 MG/ML
0.25 INJECTION, SOLUTION INTRAMUSCULAR; INTRAVENOUS; SUBCUTANEOUS
Status: DISCONTINUED | OUTPATIENT
Start: 2021-10-29 | End: 2021-11-02 | Stop reason: HOSPADM

## 2021-10-29 RX ORDER — LIDOCAINE 50 MG/G
1 PATCH TOPICAL
Status: COMPLETED | OUTPATIENT
Start: 2021-10-29 | End: 2021-10-30

## 2021-10-29 RX ORDER — OXYCODONE HYDROCHLORIDE 5 MG/1
2.5 TABLET ORAL
Status: DISCONTINUED | OUTPATIENT
Start: 2021-10-29 | End: 2021-10-29

## 2021-10-29 RX ORDER — OXYCODONE HYDROCHLORIDE 5 MG/1
5 TABLET ORAL
Status: DISCONTINUED | OUTPATIENT
Start: 2021-10-29 | End: 2021-11-02 | Stop reason: HOSPADM

## 2021-10-29 RX ORDER — ASPIRIN 300 MG/1
300 SUPPOSITORY RECTAL DAILY
Status: DISCONTINUED | OUTPATIENT
Start: 2021-10-29 | End: 2021-10-30

## 2021-10-29 RX ORDER — MORPHINE SULFATE 4 MG/ML
2-4 INJECTION, SOLUTION INTRAMUSCULAR; INTRAVENOUS
Status: ACTIVE | OUTPATIENT
Start: 2021-10-29 | End: 2021-10-29

## 2021-10-29 RX ORDER — ASPIRIN 81 MG/1
324 TABLET, CHEWABLE ORAL DAILY
Status: DISCONTINUED | OUTPATIENT
Start: 2021-10-29 | End: 2021-10-30

## 2021-10-29 RX ADMIN — INSULIN GLARGINE 6 UNITS: 100 INJECTION, SOLUTION SUBCUTANEOUS at 05:57

## 2021-10-29 RX ADMIN — CEFAZOLIN 2 G: 1 INJECTION, POWDER, FOR SOLUTION INTRAVENOUS at 05:49

## 2021-10-29 RX ADMIN — INSULIN HUMAN 2 UNITS: 100 INJECTION, SOLUTION PARENTERAL at 11:15

## 2021-10-29 RX ADMIN — ONDANSETRON 4 MG: 2 INJECTION INTRAMUSCULAR; INTRAVENOUS at 17:19

## 2021-10-29 RX ADMIN — LIDOCAINE 1 PATCH: 50 PATCH TOPICAL at 17:50

## 2021-10-29 RX ADMIN — METOPROLOL SUCCINATE 25 MG: 25 TABLET, EXTENDED RELEASE ORAL at 05:52

## 2021-10-29 RX ADMIN — ASPIRIN 325 MG ORAL TABLET 325 MG: 325 PILL ORAL at 21:06

## 2021-10-29 RX ADMIN — ASPIRIN 81 MG: 81 TABLET, COATED ORAL at 05:50

## 2021-10-29 RX ADMIN — DIGOXIN 125 MCG: 125 TABLET ORAL at 21:06

## 2021-10-29 RX ADMIN — FINASTERIDE 5 MG: 5 TABLET, FILM COATED ORAL at 05:50

## 2021-10-29 RX ADMIN — INSULIN HUMAN 2 UNITS: 100 INJECTION, SOLUTION PARENTERAL at 05:58

## 2021-10-29 RX ADMIN — GUAIFENESIN 200 MG: 100 SOLUTION ORAL at 09:13

## 2021-10-29 RX ADMIN — LIDOCAINE HYDROCHLORIDE 15 ML: 20 SOLUTION OROPHARYNGEAL at 17:11

## 2021-10-29 RX ADMIN — SENNOSIDES AND DOCUSATE SODIUM 2 TABLET: 8.6; 5 TABLET ORAL at 05:49

## 2021-10-29 RX ADMIN — CEFAZOLIN 2 G: 1 INJECTION, POWDER, FOR SOLUTION INTRAVENOUS at 21:19

## 2021-10-29 RX ADMIN — OXYCODONE 5 MG: 5 TABLET ORAL at 21:19

## 2021-10-29 RX ADMIN — Medication 5 MG: at 21:06

## 2021-10-29 RX ADMIN — INSULIN HUMAN 5 UNITS: 100 INJECTION, SOLUTION PARENTERAL at 17:22

## 2021-10-29 RX ADMIN — CEFAZOLIN 2 G: 1 INJECTION, POWDER, FOR SOLUTION INTRAVENOUS at 13:17

## 2021-10-29 RX ADMIN — APIXABAN 5 MG: 5 TABLET, FILM COATED ORAL at 05:50

## 2021-10-29 RX ADMIN — APIXABAN 5 MG: 5 TABLET, FILM COATED ORAL at 21:05

## 2021-10-29 RX ADMIN — OXYCODONE 5 MG: 5 TABLET ORAL at 13:26

## 2021-10-29 ASSESSMENT — COGNITIVE AND FUNCTIONAL STATUS - GENERAL
HELP NEEDED FOR BATHING: A LITTLE
SUGGESTED CMS G CODE MODIFIER MOBILITY: CH
DAILY ACTIVITIY SCORE: 21
TOILETING: A LITTLE
DRESSING REGULAR LOWER BODY CLOTHING: A LITTLE
SUGGESTED CMS G CODE MODIFIER DAILY ACTIVITY: CJ
MOBILITY SCORE: 24

## 2021-10-29 ASSESSMENT — ENCOUNTER SYMPTOMS
COUGH: 0
FEVER: 0
SHORTNESS OF BREATH: 0
BACK PAIN: 1

## 2021-10-29 ASSESSMENT — GAIT ASSESSMENTS
DISTANCE (FEET): 50
DISTANCE (FEET): 300
DEVIATION: STEP TO
GAIT LEVEL OF ASSIST: SUPERVISED

## 2021-10-29 ASSESSMENT — PAIN DESCRIPTION - PAIN TYPE
TYPE: ACUTE PAIN

## 2021-10-29 NOTE — PROGRESS NOTES
Pt has spine MRIs ordered.  Pt has a pacemaker which requires a rep to come in and place it in specific mode to be safe for MRI. Rep unavailable to come in this weekend.  MRI scheduled for 8:30am Monday morning.  RN informed.

## 2021-10-29 NOTE — PROGRESS NOTES
Infectious Disease Progress Note    Author: Pema Asif M.D. Date & Time of service: 10/29/2021  11:38 AM    Chief Complaint:  MSSA sepsis    Interval History:  71 y.o. diabetic male admitted 10/24/2021 for weakness, dizziness, palpitations.+ AICD, Afib on apixaban, BPH, CVA found to have new CVA and above  10/26 AF WBC more alert today-still has LE weakness and can't walk  10/27 AF WBC 4.9 repeat blood cxs +staph No new complaints ECHO not done yet  10/28 AF sleeping soundly at time of rounds No echo yet  10/29 AF WBC 4.7 c/o back pain today-not controlled with meds    Labs Reviewed, Medications Reviewed and Radiology Reviewed.    Review of Systems:  Review of Systems   Constitutional: Negative for fever.   Respiratory: Negative for cough and shortness of breath.    Musculoskeletal: Positive for back pain.   Skin: Negative for rash.   All other systems reviewed and are negative.      Hemodynamics:  Temp (24hrs), Av.8 °C (98.2 °F), Min:36.6 °C (97.8 °F), Max:37.1 °C (98.7 °F)  Temperature: 36.6 °C (97.8 °F)  Pulse  Av.8  Min: 60  Max: 103   Blood Pressure : 130/66       Physical Exam:  Physical Exam  Vitals and nursing note reviewed.   Constitutional:       General: He is not in acute distress.     Appearance: He is not ill-appearing, toxic-appearing or diaphoretic.   HENT:      Nose: No rhinorrhea.   Eyes:      General:         Right eye: No discharge.         Left eye: No discharge.   Cardiovascular:      Rate and Rhythm: Normal rate. Rhythm irregular.      Heart sounds: Murmur heard.        Comments: AICD nontender  Pulmonary:      Effort: Pulmonary effort is normal. No respiratory distress.      Breath sounds: No stridor.   Abdominal:      General: There is no distension.      Palpations: Abdomen is soft.      Tenderness: There is no abdominal tenderness.   Musculoskeletal:      Cervical back: Neck supple. No tenderness.      Right lower leg: No edema.      Left lower leg: No edema.   Skin:      Coloration: Skin is not jaundiced.      Findings: Bruising present.   Neurological:      Mental Status: He is disoriented.      Sensory: No sensory deficit.         Meds:    Current Facility-Administered Medications:   •  Pharmacy Consult Request  •  oxyCODONE immediate-release **OR** oxyCODONE immediate-release **OR** HYDROmorphone  •  guaiFENesin  •  acetaminophen  •  ceFAZolin  •  NS  •  senna-docusate **AND** polyethylene glycol/lytes **AND** magnesium hydroxide **AND** bisacodyl  •  enalaprilat  •  labetalol  •  insulin regular **AND** POC blood glucose manual result **AND** NOTIFY MD and PharmD **AND** glucose **AND** dextrose 50%  •  apixaban  •  aspirin EC  •  digoxin  •  finasteride  •  metoprolol SR  •  insulin glargine  •  melatonin  •  ondansetron  •  meclizine  •  LORazepam    Labs:  Recent Labs     10/27/21  0205 10/28/21  0157   WBC 4.9 4.7*   RBC 4.02* 4.02*   HEMOGLOBIN 14.5 14.7   HEMATOCRIT 44.9 43.5   .7* 108.2*   MCH 36.1* 36.6*   RDW 69.4* 64.3*   PLATELETCT 106* 110*   MPV 11.8 12.4     Recent Labs     10/27/21  0205 10/28/21  0157   SODIUM 133* 135   POTASSIUM 3.6 3.6   CHLORIDE 103 103   CO2 22 23   GLUCOSE 147* 136*   BUN 14 14     Recent Labs     10/27/21  0205 10/28/21  0157   ALBUMIN 2.8*  --    TBILIRUBIN 0.5  --    ALKPHOSPHAT 63  --    TOTPROTEIN 7.0  --    ALTSGPT 15  --    ASTSGOT 24  --    CREATININE 0.74 0.72       Imaging:  CT-CTA HEAD WITH & W/O-POST PROCESS    Result Date: 10/25/2021  10/24/2021 9:03 PM HISTORY/REASON FOR EXAM:  Dizziness, dehydration or hypotension; vertigo new onset, vasculopath TECHNIQUE/EXAM DESCRIPTION: CT angiogram of the [place] without and with contrast.  Initial precontrast images were obtained of the head from the skull base through the vertex.  Postcontrast images were obtained of the [place] following the power injection of nonionic contrast at 5.0 mL/sec. Thin-section helical images were obtained with overlapping  reconstruction interval. Coronal and sagittal multiplanar volume reformats were generated.  3D angiographic images were reviewed on PACS.  Maximum intensity projection (MIP) images were generated and reviewed. 80 mL of Omnipaque 350 nonionic contrast was injected intravenously. Low dose optimization technique was utilized for this CT exam including automated exposure control and adjustment of the mA and/or kV according to patient size. COMPARISON:  August 9, 2018. FINDINGS: The posterior circulation shows the distal vertebral arteries to be patent. Hypoplastic distal left vertebral artery is seen. Atherosclerotic changes in the distal right vertebral artery are seen, resulting in less than 50% stenosis The vertebrobasilar confluence is intact. The basilar artery is patent. Right proximal P2 occlusion is seen which reconstitutes distally. Persistent fetal morphology of the left posterior cerebral artery is seen. Atherosclerotic changes are seen of the bilateral intracranial internal carotid arteries resulting in less than 50% stenosis, otherwise the anterior circulation shows no stenotic or occlusive lesion. No aneurysm is evident about the [place]. Mild diffuse parenchymal volume loss is seen. There is mild periventricular and subcortical white matter low-attenuation changes. Low-density changes in the right parietal occipital lobe are seen compatible with encephalomalacia. 3D angiographic/MIP images of the vasculature confirm the vascular findings as described above.     1.  Right proximal P2 occlusion, reconstitutes distally 2.  Changes of atrophy with nonspecific white matter changes, most commonly associated with small vessel ischemia. These findings were discussed with the patient's clinician, Dr. Farooq, on 10/24/2021 10:12 PM.    CT-CTA NECK WITH & W/O-POST PROCESSING    Result Date: 10/24/2021    10/24/2021 9:03 PM HISTORY/REASON FOR EXAM:  Vertigo new onset, vasculopath TECHNIQUE/EXAM DESCRIPTION:   CT angiogram of the neck with contrast. Postcontrast images were obtained of the neck from the great vessels through the skull base following the power injection of nonionic contrast at 5.0 mL/sec. Thin-section helical images were obtained with overlapping reconstruction interval. Coronal and oblique multiplanar volume reformats were generated. Cervical internal carotid artery percent stenosis is calculated using the standard method according to the NASCET criteria wherein a segment of uniform caliber mid or distal cervical internal carotid is used as the reference denominator. 3D angiographic images were reviewed on PACS.  Maximum intensity projection (MIP) images were generated and reviewed 80 mL of Omnipaque 350 nonionic contrast was injected intravenously. Low dose optimization technique was utilized for this CT exam including automated exposure control and adjustment of the mA and/or kV according to patient size. COMPARISON: August 9, 2018 FINDINGS: 1.4 cm peripherally calcified left thyroid cyst is seen. Aortic arch: bovine type branching pattern. There is atherosclerotic plaque of the aorta. Right common carotid artery: Patent Right internal carotid artery: Atherosclerotic plaque without significant stenosis (less than 50%). Left common carotid artery is patent. Left internal carotid artery: Atherosclerotic plaque without significant stenosis (less than 50%). The right vertebral artery is patent without dissection or stenosis. The left vertebral artery is patent without dissection or stenosis. Vertebrobasilar confluence: The vertebrobasilar confluence appears normal. Lung apices are clear The soft tissues of the neck are within normal limits. 3D angiographic/MIP images of the vasculature confirm the vascular findings as described above.     1.  No high-grade stenosis, occlusion, or aneurysm appreciated.     DX-CHEST-PORTABLE (1 VIEW)    Result Date: 10/24/2021  10/24/2021 5:30 PM HISTORY/REASON FOR EXAM:   "Chest Pain TECHNIQUE/EXAM DESCRIPTION AND NUMBER OF VIEWS: Single portable view of the chest. COMPARISON:  7/29/2021, 5/28/2021 FINDINGS: Left bipolar transvenous pacing device remains in place. The cardiac silhouette is within normal limits. No infiltrates or consolidations are noted. No pleural effusion is noted.     No acute cardiac or pulmonary abnormality is noted.      Micro:  Results     Procedure Component Value Units Date/Time    BLOOD CULTURE [[id number]] Collected: 10/28/21 0157    Order Status: Completed Specimen: Blood from Peripheral Updated: 10/29/21 0744     Significant Indicator NEG     Source BLD     Site PERIPHERAL     Culture Result No Growth  Note: Blood cultures are incubated for 5 days and  are monitored continuously.Positive blood cultures  are called to the RN and reported as soon as  they are identified.  Blood culture testing and Gram stain, if indicated, are  performed at [facility], [address] [place] [address].  Positive blood cultures are  sent to [facility], [address] [place] [address], for organism identification and  susceptibility testing.      Narrative:      Per Hospital Policy: Only change Specimen Src: to \"Line\" if  specified by physician order.  Right hand    BLOOD CULTURE [[id number]] Collected: 10/28/21 0157    Order Status: Completed Specimen: Blood from Peripheral Updated: 10/29/21 0744     Significant Indicator NEG     Source BLD     Site PERIPHERAL     Culture Result No Growth  Note: Blood cultures are incubated for 5 days and  are monitored continuously.Positive blood cultures  are called to the RN and reported as soon as  they are identified.  Blood culture testing and Gram stain, if indicated, are  performed at [facility], [address] [place] [address].  Positive blood cultures are  sent to [facility], [address] [place] [address], for organism identification " "and  susceptibility testing.      Narrative:      Per Hospital Policy: Only change Specimen Src: to \"Line\" if  specified by physician order.  Left wriast    BLOOD CULTURE [[id number]]  (Abnormal) Collected: 10/26/21 0807    Order Status: Completed Specimen: Blood from Peripheral Updated: 10/28/21 1113     Significant Indicator POS     Source BLD     Site PERIPHERAL     Culture Result Growth detected by Bactec instrument. 10/27/2021  05:08      Staphylococcus aureus  See previous culture for sensitivity report.      Narrative:      CALL  Russell  [facility] tel. [phone],  CALLED  [facility] tel. [phone] 10/27/2021, 05:09, RB PERF. RESULTS CALLED  TO:[id number], RN  Per Hospital Policy: Only change Specimen Src: to \"Line\" if  specified by physician order.  Right Hand    BLOOD CULTURE [[id number]]  (Abnormal) Collected: 10/26/21 0807    Order Status: Completed Specimen: Blood from Peripheral Updated: 10/28/21 1109     Significant Indicator POS     Source BLD     Site PERIPHERAL     Culture Result Growth detected by Bactec instrument. 10/27/2021  08:43      Staphylococcus aureus  See previous culture for sensitivity report.      Narrative:      Per Hospital Policy: Only change Specimen Src: to \"Line\" if  specified by physician order.  Right Wrist    BLOOD CULTURE [[id number]]  (Abnormal) Collected: 10/24/21 1840    Order Status: Completed Specimen: Blood from Peripheral Updated: 10/27/21 1119     Significant Indicator POS     Source BLD     Site PERIPHERAL     Culture Result Growth detected by Bactec instrument. 10/25/2021  08:14  Blood culture testing and Gram stain, if indicated, are  performed at [facility], [address] [place] [address].  Positive blood cultures are  sent to [facility], [address] [place] [address], for organism identification and  susceptibility testing.        Staphylococcus aureus  See previous culture for sensitivity report.      Narrative:      2 of 2 blood " "culture x2  Sites order. Per Hospital Policy:  Only change Specimen Src: to \"Line\" if specified by physician  order.  Left AC    BLOOD CULTURE [[id number]]  (Abnormal)  (Susceptibility) Collected: 10/24/21 1845    Order Status: Completed Specimen: Blood from Peripheral Updated: 10/27/21 1119     Significant Indicator POS     Source BLD     Site PERIPHERAL     Culture Result Growth detected by Bactec instrument. 10/25/2021  07:57  Staphylococcus aureus (methicillin sensitive)  detected by PCR.        Staphylococcus aureus    Narrative:      CALL  Russell  [facility] tel. [phone],  CALLED  SG tel. [phone] 10/25/2021, 13:01, RB PERF. RESULTS CALLED  TO:Michael pharm  1 of 2 for Blood Culture x 2 sites order. Per Hospital  Policy: Only change Specimen Src: to \"Line\" if specified by  physician order.  Left AC    Susceptibility     Staphylococcus aureus (1)     Antibiotic Interpretation Microscan Method Status    Azithromycin Sensitive <=2 mcg/mL JAVI Final    Clindamycin Sensitive <=0.25 mcg/mL JAVI Final    Cefazolin Sensitive <=8 mcg/mL JAVI Final    Cefepime Sensitive <=4 mcg/mL JAVI Final    Ceftaroline Sensitive <=0.5 mcg/mL JAVI Final    Daptomycin Sensitive <=0.5 mcg/mL JAVI Final    Ampicillin/sulbactam Sensitive <=8/4 mcg/mL JAVI Final    Erythromycin Sensitive <=0.25 mcg/mL JAVI Final    Vancomycin Sensitive 1 mcg/mL JAVI Final    Oxacillin Sensitive 1 mcg/mL JAVI Final    Pip/Tazobactam Sensitive <=8 mcg/mL JAVI Final    Trimeth/Sulfa Sensitive <=0.5/9.5 mcg/mL JAVI Final    Tetracycline Sensitive <=4 mcg/mL JAVI Final                   URINALYSIS (UA) [[id number]]  (Abnormal) Collected: 10/24/21 1840    Order Status: Completed Specimen: Urine, Clean Catch Updated: 10/24/21 1958     Color Yellow     Character Clear     Specific Gravity 1.015     Ph 5.5     Glucose >=1000 mg/dL      Ketones Trace mg/dL      Protein Negative mg/dL      Bilirubin Negative     Nitrite Negative     Leukocyte Esterase Negative     Occult Blood " Moderate     Micro Urine Req Microscopic          Assessment:  Active Hospital Problems    Diagnosis    • *Vertigo [R42]    • Bacteremia [R78.81]    • Ischemic cardiomyopathy [I25.5]    • Orthostatic hypotension [I95.1]    • Essential hypertension [I10]    • History of stroke- old right parietal/occipital [Z86.73]    • CAD (coronary artery disease) [I25.10]    • Type 2 diabetes mellitus without complication, with long-term current use of insulin (HCC) [E11.9, Z79.4]    • Paroxysmal A-fib (HCC) [I48.0]    • Benign prostatic hyperplasia without lower urinary tract symptoms [N40.0]        Plan:  MSSA sepsis  AICD in place Additional work  Afebrile  No leukocytosis-mild leukopenia  BCxs + MSSA 10/24 and 10/26  Repeat Bcxs every 48 hours until neg-repeat 10/28 prelim neg  PICC or midline when Bcxs neg 48 hours  Recommend PAMELA as he has AICD  Given mult +blood cxs AICD is likely infected and will require explantation  Continue cefazolin  Anticipate 6 weeks IV abx from date of negative blood cxs if no new complications    New back pain  Needs MRI T and L spine    CVA  Mult CVA by MRI, chronic  No bleed    Immunosuppressed  PV  Lupus      Diabetes  Keep BS under 150 to help control current infection      [facility]

## 2021-10-29 NOTE — THERAPY
Occupational Therapy  Daily Treatment     Patient Name: Francesco Schulte  Age:  71 y.o., Sex:  male  Medical Record #: [id number]  Today's Date: 10/29/2021     Precautions  Precautions: Fall Risk, Swallow Precautions ( See Comments)  Comments: high fall risk; refuses AD during ambulation    Assessment  Pt eager for activity; shows improved safety, balance,activity tolerance, coordination during ADL's; see below for details. Making good progress toward established goals. V/c's for safety at times. OT will follow while in house.     Plan  Continue current treatment plan.    DC Equipment Recommendations: (P) Unable to determine at this time  Discharge Recommendations: (P) Recommend home health for continued occupational therapy services     10/29/21 1450   Pain 0 - 10 Group   Location Shoulder   Location Orientation Right;Left   Therapist Pain Assessment Post Activity Pain Same as Prior to Activity;Nurse Notified   Cognition    Cognition / Consciousness   (Ox3)   Level of Consciousness Alert   Safety Awareness Impaired   Comments Improved, but poor insight.    Balance   Sitting Balance (Static) Good   Sitting Balance (Dynamic) Fair +   Standing Balance (Static) Fair +   Standing Balance (Dynamic) Fair   Weight Shift Sitting Good   Weight Shift Standing Fair   Skilled Intervention Verbal Cuing   Bed Mobility    Supine to Sit Modified Independent   Sit to Supine Modified Independent   Scooting Modified Independent   Activities of Daily Living   Eating Independent   Grooming Supervision;Standing   Upper Body Dressing Independent   Lower Body Dressing Moderate Assist  (with brief)   Toileting Minimal Assist   Skilled Intervention Verbal Cuing;Sequencing   How much help from another person does the patient currently need...   Putting on and taking off regular lower body clothing? 3   Bathing (including washing, rinsing, and drying)? 3   Toileting, which includes using a toilet, bedpan, or urinal? 3   Putting on and  taking off regular upper body clothing? 4   Taking care of personal grooming such as brushing teeth? 4   Eating meals? 4   6 Clicks Daily Activity Score 21   Functional Mobility   Sit to Stand Supervised   Bed, Chair, Wheelchair Transfer Supervised   Toilet Transfers Supervised   Transfer Method Stand Step   Distance (Feet) 50   # of Times Distance was Traveled 2   Activity Tolerance   Sitting in Chair >1hr    Sitting Edge of Bed 18   Standing 10   Short Term Goals   Goal Outcome # 1 Progressing as expected   Goal Outcome # 2 Progressing as expected

## 2021-10-29 NOTE — THERAPY
Speech Language Pathology   Initial Assessment     Patient Name: Francesco Schulte  AGE:  71 y.o., SEX:  male  Medical Record #: [id number]  Today's Date: 10/29/2021     Precautions  Precautions: Fall Risk, Swallow Precautions (see Comments)  Comments: high fall risk; refuses AD during ambulation    Assessment    Patient is 70 y/o admitted on 10/24 for chest pain, SOB, weakness, and dizziness. PMH includes diabetes, lipidemia, afib. Not seen by SLP before at [facility].       The patient was seen on this date for a cognitive evaluation. They were provided the Cognistat, along with other nonstandardized assessment tools to determine the presence of a cognitive impairment. The patient was agreeable to participate until abstract reasoning portions, at which he refused to continue cognitive evaluation. The patient scored with an overall mild cognitive deficits as seen with mild memory deficits, and mild deficits in abstract reasoning. The patient scored average on orientation, language, calculations and medication management portions. Per PT and OT notes, patient presented with impaired judgement and poor insight into deficits during therapy sessions. The patient would benefit from further testing to determine written and reading comprehension, however patient was extremely frustrated at SLP for preforming cognitive evaluation and asked not to see this clinician again.     At this point would recommend patient discharge home with direct supervision during IADLs including medication management, scheduling, financial management.    Of note: Discussed patient's current cognitive status vs his baseline and patients wife feels that he is not safe to return home with her as she cannot provide supervision during the day. Additionally, patient's wife reports patient is having confabulations about getting nurses fired over the children he heard last night playing in the hospital.     Plan    Recommend Speech Therapy 3 times per  "week until therapy goals are met for the following treatments:  Dysphagia Training, Cognitive-Linguistic Training and Patient / Family / Caregiver Education.    Discharge Recommendations: Recommend home health for continued speech therapy services    Objective       10/29/21 1110   Verbal Expression   Verbal Expression / Aphasia Eval (WDL) WDL   Auditory Comprehension   Auditory Comprehension (WDL) WDL   Reading Comprehension   Reading Sentences Within Functional Limits (6-7)   Written Expression   Comments Not tested- patient refused    Cognitive-Linguistic   Level of Consciousness Alert   Orientation Level Oriented x 4   Sustained Attention Unable To Fully Assess Due To Speech / Language Deficits   Short Term Memory Minimal (4)   Immediate Memory Within Functional Limits (6-7)   Simple Reasoning / Problem Solving Within Functional Limits (6-7)   Abstract Reasoning Moderate (3)   Insight into Deficits Moderate (3)   Auditory Math Within Functional Limits (6-7)   Medication Management  Within Functional Limits (6-7)   Patient / Family Goals   Patient / Family Goal #1 \"something to drink\"   Goal #1 Outcome Progressing as expected   Short Term Goals   Short Term Goal # 2 Pt will complete reading comprehension/written expression tasks with 80% accuracy given min verbal cues          "

## 2021-10-29 NOTE — DISCHARGE PLANNING
Good afternoon,  Received updated Home Health order which includes Home Infusions/PICC care.  Pending [facility] care orders at this time.  Thank you!

## 2021-10-29 NOTE — PROGRESS NOTES
"Hospital Medicine Daily Progress Note    Date of Service  10/29/2021    Chief Complaint  Francesco Schulte is a 71 y.o. male admitted 10/24/2021 with dizziness.    Hospital Course  A 71-year-old man with ho DM, HLD, HFrEF, s/p AICD, Afib (AC with apixaban, BPH, CVA presented with dizziness, lower extremity weakness for 2 days.   CTA showed rigth proximal P2 occlusion. Neurology recommended MRI brain. Blood cultures positive for MSSA. ID recommended continue cefazolin, anticipate 4 weeks of antibiotics.    Interval Problem Update  10/26: Patient reports dizziness on standing, nausea, no strength on b/l legs. Afebrile, hemodynamically stable. On room air. Patient had some pancreatic mass, and supposed to get MRI of pancreas in 2 weeks. MRCP ordered. No stool x2 days. Blood cultures from 10/24 positive for S.aureus     10/27: Patient had chest pain at night, pleuritic. Troponin negative. ECG showed sinus rhythm, incomplete LLB, LVH. Afebrile, hemodynamically stable. Na 133. Tired. No chest pain in the morning. Now able to move legs.     10/28: PT, OT recommended HH. Afebrile, hemodynamically stable.  MRI spine ordered to r/o spinal infection. ID following, recommendations appreciated.    10/29: Patient reports shoulders, arms, back pain, cant hardly move, \"lot of pain\". Afebrile, hemodynamically stable. MRI on Monday. PAMELA tomorrow, NPO midnight. Ordered TTE    I have personally seen and examined the patient at bedside. I discussed the plan of care with patient, bedside RN, charge RN,  and pharmacy.    Consultants/Specialty  infectious disease    Code Status  Full Code    Disposition  Patient is not medically cleared.   Anticipate discharge to to home with close outpatient follow-up.  I have placed the appropriate orders for post-discharge needs.    Review of Systems  Constitutional: Positive for malaise/fatigue. Negative for chills and fever.   HENT: Negative.    Eyes: Negative.    Respiratory: " Negative for cough and shortness of breath.    Cardiovascular: Negative for chest pain and leg swelling.   Gastrointestinal: Positive for nausea. Negative for abdominal pain, diarrhea and vomiting.   Genitourinary: Negative for dysuria.   Musculoskeletal: Negative for myalgias.   Skin: Negative for rash.   Neurological: Positive for dizziness and weakness.     Physical Exam  Temp:  [36.6 °C (97.8 °F)-37.1 °C (98.7 °F)] 36.6 °C (97.8 °F)  Pulse:  [60-74] 73  Resp:  [17-18] 17  BP: (130-139)/(66-72) 130/66  SpO2:  [93 %-95 %] 95 %    Physical Exam  Vitals and nursing note reviewed.   Constitutional:       Appearance: He is ill-appearing.   HENT:      Head: Normocephalic and atraumatic.      Mouth/Throat:      Mouth: Mucous membranes are moist.      Pharynx: Oropharynx is clear.   Eyes:      Pupils: Pupils are equal, round, and reactive to light.   Cardiovascular:      Rate and Rhythm: Normal rate and regular rhythm.   Pulmonary:      Effort: Pulmonary effort is normal. No respiratory distress.      Breath sounds: No wheezing or rales.   Abdominal:      General: Abdomen is flat. Bowel sounds are normal. There is no distension.      Tenderness: There is no abdominal tenderness. There is no guarding.   Musculoskeletal:         General: Normal range of motion.      Cervical back: Normal range of motion.   Skin:     General: Skin is warm.   Neurological:      General: No focal deficit present.      Mental Status: He is alert and oriented to person, place, and time.     Fluids    Intake/Output Summary (Last 24 hours) at 10/29/2021 1250  Last data filed at 10/29/2021 1200  Gross per 24 hour   Intake 2233.48 ml   Output --   Net 2233.48 ml       Laboratory  Recent Labs     10/27/21  0205 10/28/21  0157   WBC 4.9 4.7*   RBC 4.02* 4.02*   HEMOGLOBIN 14.5 14.7   HEMATOCRIT 44.9 43.5   .7* 108.2*   MCH 36.1* 36.6*   MCHC 32.3* 33.8   RDW 69.4* 64.3*   PLATELETCT 106* 110*   MPV 11.8 12.4     Recent Labs     10/27/21  0205  10/28/21  0157   SODIUM 133* 135   POTASSIUM 3.6 3.6   CHLORIDE 103 103   CO2 22 23   GLUCOSE 147* 136*   BUN 14 14   CREATININE 0.74 0.72   CALCIUM 8.3* 8.4                   Imaging  MR-BRAIN-W/O   Final Result      1.  Mild cerebral atrophy.   2.  Chronic encephalomalacic changes right lateral occipital lobe, right parietal lobe, right posterior frontal lobe most consistent with old cerebral infarcts.   3.  Small area of chronic encephalomalacic change at the right anterior parasagittal frontal lobe which has developed since the prior exam. This may represent an old infarction versus chronic sequela of posttraumatic brain contusion.   4.  Small area of chronic encephalomalacic change at the inferior surface of the left temporal lobe which was not present on the prior exam from 2018. Lesion location and morphology is most suggestive of chronic posttraumatic brain contusion.   5.  No evidence of acute infarction, acute hemorrhage, or mass lesion.      ST-IGLYYCC-J/O   Final Result      1.  There is cholelithiasis without pericholecystic, gallbladder wall thickening or biliary dilatation.   2.  There is no choledocholithiasis.   3.  There is a potential 2 cm cystic lesion in the pancreatic tail region/splenic hilum.      CT-CTA NECK WITH & W/O-POST PROCESSING   Final Result         1.  No high-grade stenosis, occlusion, or aneurysm appreciated.         CT-CTA HEAD WITH & W/O-POST PROCESS   Final Result         1.  Right proximal P2 occlusion, reconstitutes distally   2.  Changes of atrophy with nonspecific white matter changes, most commonly associated with small vessel ischemia.      These findings were discussed with the patient's clinician, Dr. Farooq, on 10/24/2021 10:12 PM.      DX-CHEST-PORTABLE (1 VIEW)   Final Result      No acute cardiac or pulmonary abnormality is noted.      EC-PAMELA W/O CONT    (Results Pending)   MR-CERVICAL SPINE-WITH & W/O    (Results Pending)   MR-THORACIC SPINE-WITH & W/O    (Results  Pending)   MR-LUMBAR SPINE-WITH & W/O    (Results Pending)   EC-ECHOCARDIOGRAM COMPLETE W/O CONT    (Results Pending)        Assessment/Plan  * Bacteremia  Assessment & Plan  MSSA  Patient with incidental blood cultures positive for gram positive cocci concerning for staph species  No clear source of infection identified, UA negative and CXR nonrevealing  ID following, recommendations appreciated  Continue cefazolin    Ischemic cardiomyopathy- (present on admission)  Assessment & Plan  -Per cardiology notes. Status post AICD 09/2021  -Last echo 05/2021 with a EF of 15-20%  -Continue metoprolol and digoxin. Not on ACEI/ARB due to intolerance per cardiology notes  -Patient is to be on spironolactone per cardiology note 09/2021 however says is not taking it  -Med rec fluids bumetanide, patient states he is not on any diuretic  - no active volume overload at this time     Vertigo- (present on admission)  Assessment & Plan  -pt states he has a hx of vertigo which had resolved, complaining of vertigo now, dizziness like room spinning with rapid movement of head to one side  -pt with hx of CVA and CAD. Bilateral nystagmus on exam concerning for central cause   -[place]-Hallpike Maneuver for further eval not done since pt too nauseous   -meclizine and zofran for symptom control  -CT head and neck shows right PCA occlusion however unclear if acute or chronic as patient has had previous posterior circulation stroke  MRI brain showed chronic multiple infarctions  Neurology recommended to resume anticoagulation for stroke prevention    Orthostatic hypotension- (present on admission)  Assessment & Plan  -Reported history of orthostatic hypotension per notes, currently complaining of dizziness upon change of position consistent with orthostatic hypotension  -check orthostatic vitals   -Would be difficult to keep patient hydrated due to history of HFrEF    Essential hypertension- (present on admission)  Assessment & Plan  -Continue  metoprolol    History of stroke- old right parietal/occipital- (present on admission)  Assessment & Plan  Continue aspirin. Patient not on statin due to history of statin intolerance    CAD (coronary artery disease)- (present on admission)  Assessment & Plan  Continue aspirin. Patient not on statin due to history of statin intolerance  Goal directed medical therapy as tolerated  No active chest pain at this time     Type 2 diabetes mellitus without complication, with long-term current use of insulin (HCC)- (present on admission)  Assessment & Plan  -Continue long-acting insulin  -Insulin sliding scale and hypoglycemia protocol while in hospital    Paroxysmal A-fib (HCC)- (present on admission)  Assessment & Plan  -Rate controlled, not in RVR  -Continue metoprolol, digoxin   -resumed apixaban per neurology on 10/27    Benign prostatic hyperplasia without lower urinary tract symptoms- (present on admission)  Assessment & Plan  Continue finasteride       VTE prophylaxis: therapeutic anticoagulation with apixaban    I have performed a physical exam and reviewed and updated ROS and Plan today (10/29/2021). In review of yesterday's note (10/28/2021), there are no changes except as documented above.

## 2021-10-29 NOTE — PROGRESS NOTES
"Received report from night shift RN. Patient asking \"what were the kids doing last night?\" however, patient able to answer orientation questions correctly. No signs of distress. VS reviewed. Patient denies pain at this time. Bed in lowest, locked position. Safety precautions in place.     COVID-19 surge in effect.   "

## 2021-10-29 NOTE — THERAPY
Physical Therapy   Daily Treatment     Patient Name: Francesco Schulte  Age:  71 y.o., Sex:  male  Medical Record #: [id number]  Today's Date: 10/29/2021     Precautions  Precautions: Fall Risk;Swallow Precautions ( See Comments)    Assessment    Pt has made nice improvement and is now safe to ambulate with nursing PT goals met   Plan    Discharge secondary to goals met.      10/29/21 1500   Total Time Spent   Total Time Spent (Mins) 25   Charge Group   PT Gait Training 1   PT Therapeutic Activities 1   Pain 0 - 10 Group   Therapist Pain Assessment 0   Balance   Sitting Balance (Static) Good   Sitting Balance (Dynamic) Good   Standing Balance (Static) Fair +   Standing Balance (Dynamic) Fair +   Weight Shift Sitting Good   Weight Shift Standing Good   Gait Analysis   Gait Level Of Assist Supervised   Assistive Device None   Distance (Feet) 300   # of Times Distance was Traveled 1   Deviation Step To   Bed Mobility    Supine to Sit Supervised   Sit to Supine Supervised   Scooting Supervised   Functional Mobility   Sit to Stand Supervised   Bed, Chair, Wheelchair Transfer Minimal Assist   Transfer Method Stand Step   How much difficulty does the patient currently have...   Turning over in bed (including adjusting bedclothes, sheets and blankets)? 4   Sitting down on and standing up from a chair with arms (e.g., wheelchair, bedside commode, etc.) 4   Moving from lying on back to sitting on the side of the bed? 4   How much help from another person does the patient currently need...   Moving to and from a bed to a chair (including a wheelchair)? 4   Need to walk in a hospital room? 4   Climbing 3-5 steps with a railing? 4   6 clicks Mobility Score 24   Activity Tolerance   Sitting Edge of Bed 5   Standing 10   Short Term Goals    Short Term Goal # 1 Pt will be able to ambulate 200 ft without AD Svetlana in 6 visits so can DC home safely.   Goal Outcome # 1 Goal met   Short Term Goal # 2 Pt will be able to perform sit <>  stand and transfer Svetlana in 6 visits so can DC home safely.   Goal Outcome # 2 Goal met   Interdisciplinary Plan of Care Collaboration   IDT Collaboration with  Nursing   Session Information   Date / Session Number  10/29-2 2/3 11/2       DC Equipment Recommendations: Front-Wheel Walker  Discharge Recommendations: Recommend home health for continued physical therapy services

## 2021-10-29 NOTE — CARE PLAN
The patient is Stable - Low risk of patient condition declining or worsening    Shift Goals  Clinical Goals: Patient will not be dizzy  Patient Goals: Rest  Family Goals: No family present    Progress made toward(s) clinical / shift goals:  Patient did not report dizziness upon ambulation.     Problem: Knowledge Deficit - Standard  Goal: Patient and family/care givers will demonstrate understanding of plan of care, disease process/condition, diagnostic tests and medications  Outcome: Progressing     Problem: Pain - Standard  Goal: Alleviation of pain or a reduction in pain to the patient’s comfort goal  Outcome: Progressing  Note: Patient reporting pain this shift, medicated per MAR.      Problem: Fall Risk  Goal: Patient will remain free from falls  Outcome: Progressing

## 2021-10-29 NOTE — CONSULTS
Cardiology Initial Consult Note    Date of note:    10/29/2021      Consulting Physician: Deb Peres M.D.    Name:   Francesco Schulte   YOB: 1950  Age:   71 y.o.  male   MRN:   [id number]      Reason for Consultation: MSSA bacteremia, rule out endocarditis    HPI:  Francesco Schulte is a 71 y.o. male with a history of ischemic cardiomyopathy with EF 15 to 20%, CAD with multiple anterior STEMI in the past, paroxysmal atrial fibrillation on anticoagulation with Eliquis, TIA, hypertension and hyperlipidemia who presented to [facility] on 10/24/2021 with ongoing dizziness, palpitations and generalized weakness.  AICD interrogated in the ED showed no arrhythmia or discharge.  Blood cultures obtained were positive for MSSA bacteremia for which cardiology is consulted to rule out bacterial endocarditis.    Patient reports no ongoing chest pain but had severe abdominal pain yesterday and feels that both of his arms are numb.  Is quite worried about ongoing symptoms.      ROS  All others reviewed and negative except for pertinent positives mentioned in HPI.      Past Medical History:   Diagnosis Date   • Agent orange exposure    • Anesthesia     resistant to pain meds   • Cancer (HCC)     polycythemia vera   • Diabetes (HCC)     insulin and oral meds   • Family history of punctured lung 2002    left lung   • Heart attack (HCC) 03/2017   • Hemorrhagic disorder (HCC)     on blood thinners   • High cholesterol    • Ischemic cardiomyopathy    • Kidney stones     hx of kidney stones   • Orthostatic hypotension 3/29/2018   • Pain     headache pain   • Polycythemia vera(238.4)    • Stroke (HCC) 2016    r/t afib; eye issues resolved afterward   • Urinary bladder disorder     enlarged prostate, weak stream, difficulty urinating   • Vertigo        Past Surgical History:   Procedure Laterality Date   • AICD IMPLANT  07/29/2021    [employer] D233 implanted by Dr. Isaac.   •  SPLIT THICKNESS SKIN GRAFT N/A 8/23/2020    Procedure: APPLICATION, GRAFT, SKIN, SPLIT-THICKNESS;  Surgeon: Elisa Craig M.D.;  Location: [facility];  Service: Plastics   • SKIN ABSCESS INCISION AND DRAINAGE Right 8/3/2020    Procedure: INCISION AND DRAINAGE - SCROTAL WOUND - WOUND VAC PLACEMENT;  Surgeon: Jaylen Hayes M.D.;  Location: [facility];  Service: Urology   • PB EXPLORE SCROTUM Right 7/31/2020    Procedure: EXPLORATION, SCROTUM - FOR WASH OUT OF SCROTAL WOUND & WOUND VAC PLACEMENT;  Surgeon: Ferny Rosales M.D.;  Location: [facility];  Service: Urology   • PB EXPLORE SCROTUM Right 7/29/2020    Procedure: EXPLORATION, SCROTUM - FOR I & D OF SCROTAL AND  GROIN WOUND;  Surgeon: Donta Viera M.D.;  Location: [facility];  Service: Urology   • PB INCIS/DRAIN SCROTUM/TESTIS,EPIDIDYM Right 7/25/2020    Procedure: INCISION AND DRAINAGE, SCROTUM;  Surgeon: Nick Negro M.D.;  Location: [facility];  Service: Urology   • TEMPORAL ARTERY BIOPSY Right 6/26/2018    Procedure: TEMPORAL ARTERY BIOPSY;  Surgeon: Rajendra Aguilar M.D.;  Location: SURGERY SAME DAY [facility];  Service: General   • CAROTID ENDARTERECTOMY Right 3/21/2018    Procedure: CAROTID ENDARTERECTOMY- W/EEG MONITORING;  Surgeon: Benny Morfin M.D.;  Location: SURGERY [facility];  Service: General   • APPENDECTOMY     • CATH PLACEMENT      right arm   • LAMINOTOMY      diskectomy; C4   • OTHER CARDIAC SURGERY      stents x 3         Medications Prior to Admission   Medication Sig Dispense Refill Last Dose   • finasteride (PROSCAR) 5 MG Tab TAKE 1 TABLET BY MOUTH EVERY DAY 90 Tablet 1 10/24/2021 at am   • apixaban (ELIQUIS) 5mg Tab Take 1 Tablet by mouth 2 times a day. 180 Tablet 3 10/24/2021 at am   • dicyclomine (BENTYL) 10 MG Cap Take 10 mg by mouth every day.   10/24/2021 at am   • nitroglycerin (NITROSTAT) 0.4 MG SL Tab Place 1 Tablet under the  tongue as needed for Chest Pain (or hypertension >180/110). 25 Tablet 3 prn at prn   • bumetanide (BUMEX) 0.5 MG Tab Take 1 tablet by mouth every day. 30 tablet 4 not taking at not taking    • docusate sodium (COLACE) 100 MG Cap Take 1 capsule by mouth 2 times a day as needed for Constipation. 90 capsule 1 10/24/2021 at am   • digoxin (LANOXIN) 125 MCG Tab Take 1 tablet by mouth every day at 6 PM. 90 tablet 3 10/24/2021 at am   • metoprolol SR (TOPROL XL) 25 MG TABLET SR 24 HR Take 1 tablet by mouth every evening. 90 tablet 3 10/24/2021 at am   • Blood Glucose Monitoring Suppl (ONETOUCH VERIO) w/Device Kit 1 Device 3 times a day before meals. 1 Kit 1    • Insulin Degludec (TRESIBA FLEXTOUCH) 200 UNIT/ML Solution Pen-injector Inject 12 Units under the skin every evening. (Patient taking differently: Inject 6 Units under the skin every morning.) 3 mL 11 10/24/2021 at am   • Blood Glucose Test Strips verio test strips for glucose monitoring TID and  Each 5    • hydroxyurea (HYDREA) 500 MG Cap Take 500 mg by mouth 2 Times a Day.   10/24/2021 at am   • aspirin EC (ECOTRIN) 81 MG Tablet Delayed Response Take 1 Tab by mouth every day. 30 Tab  10/24/2021 at am   • therapeutic multivitamin-minerals (THERAGRAN-M) Tab Take 1 Tab by mouth every day.   10/24/2021 at am     Current Facility-Administered Medications   Medication Dose Route Frequency Provider Last Rate Last Admin   • Pharmacy Consult Request ...Pain Management Review 1 Each  1 Each Other PHARMACY TO DOSE Deb Peres M.D.       • oxyCODONE immediate-release (ROXICODONE) tablet 2.5 mg  2.5 mg Oral Q3HRS PRN Deb Peres M.D.        Or   • oxyCODONE immediate-release (ROXICODONE) tablet 5 mg  5 mg Oral Q3HRS PRN Deb Peres M.D.        Or   • HYDROmorphone (Dilaudid) injection 0.25 mg  0.25 mg Intravenous Q3HRS PRN Deb Peres M.D.       • guaiFENesin (ROBITUSSIN) 100 MG/5ML solution 200 mg  10 mL Oral Q4HRS PRN Deb Peres,  M.D.   200 mg at 10/29/21 0913   • acetaminophen (Tylenol) tablet 650 mg  650 mg Oral Q4HRS PRN Ramesh Alsotn M.D.   650 mg at 10/27/21 0505   • ceFAZolin (ANCEF) 2 g in  mL IVPB premix  2 g Intravenous Q8HRS Pema Asif M.D.   Stopped at 10/29/21 0619   • NS infusion   Intravenous Continuous Blanquita Moe M.D. 83 mL/hr at 10/28/21 2137 New Bag at 10/28/21 2137   • senna-docusate (PERICOLACE or SENOKOT S) 8.6-50 MG per tablet 2 Tablet  2 Tablet Oral BID Blanquita Moe M.D.   2 Tablet at 10/29/21 0549    And   • polyethylene glycol/lytes (MIRALAX) PACKET 1 Packet  1 Packet Oral QDAY PRN Blanquita Moe M.D.        And   • magnesium hydroxide (MILK OF MAGNESIA) suspension 30 mL  30 mL Oral QDAY PRN Blanquita Moe M.D.        And   • bisacodyl (DULCOLAX) suppository 10 mg  10 mg Rectal QDAY PRN Blanquita Moe M.D.       • enalaprilat (Vasotec) injection 1.25 mg 1 mL  1.25 mg Intravenous Q6HRS PRN Blanquita Moe M.D.       • labetalol (NORMODYNE/TRANDATE) injection 10 mg  10 mg Intravenous Q4HRS PRN Blanquita Moe M.D.       • insulin regular (HumuLIN R,NovoLIN R) injection  2-9 Units Subcutaneous 4X/DAY ACHS Blanquita Moe M.D.   2 Units at 10/29/21 1115    And   • glucose 4 g chewable tablet 16 g  16 g Oral Q15 MIN PRN Blanquita Moe M.D.        And   • dextrose 50% (D50W) injection 50 mL  50 mL Intravenous Q15 MIN PRN Blanquita Moe M.D.       • apixaban (ELIQUIS) tablet 5 mg  5 mg Oral BID Blanquita Moe M.D.   5 mg at 10/29/21 0550   • aspirin EC (ECOTRIN) tablet 81 mg  81 mg Oral DAILY Blanquita Moe M.D.   81 mg at 10/29/21 0550   • digoxin (LANOXIN) tablet 125 mcg  125 mcg Oral DAILY AT 1800 Blanquita Moe M.D.   125 mcg at 10/28/21 1801   • finasteride (PROSCAR) tablet 5 mg  5 mg Oral DAILY Blanquita Moe M.D.   5 mg at 10/29/21 0550   • metoprolol SR (TOPROL XL) tablet 25 mg  25 mg Oral DAILY Blanquita Moe M.D.   25 mg at 10/29/21 0552   • insulin glargine (Semglee) injection  6  "Units Subcutaneous QAM Blanquita Moe M.D.   6 Units at 10/29/21 0557   • melatonin tablet 5 mg  5 mg Oral Nightly Blanquita Moe M.D.   5 mg at 10/28/21 2120   • ondansetron (ZOFRAN) syringe/vial injection 4 mg  4 mg Intravenous Q4HRS PRN Blanquita Moe M.D.   4 mg at 10/27/21 2134   • meclizine (ANTIVERT) tablet 25 mg  25 mg Oral TID PRN Blanquita Moe M.D.   25 mg at 10/25/21 0637   • LORazepam (ATIVAN) injection 0.5 mg  0.5 mg Intravenous Q4HRS PRN Neptali David D.O.   0.5 mg at 10/27/21 0455         Allergies   Allergen Reactions   • Doxycycline      Swelling lips and difficulties swallowing   • Atorvastatin    • Beta Adrenergic Blockers Unspecified     dizziness   • Metformin Unspecified and Palpitations     Cardiac effects  \"Similar to a heart attack, I was hospitalized\"   • Simvastatin      Other reaction(s): Other (Specify with Comments)  Myalgias  Myalgias   • Statins [Hmg-Coa-R Inhibitors]      Muscle ache, joint pain         No family history on file.      Social History     Socioeconomic History   • Marital status:      Spouse name: Not on file   • Number of children: Not on file   • Years of education: Not on file   • Highest education level: Not on file   Occupational History   • Not on file   Tobacco Use   • Smoking status: Never Smoker   • Smokeless tobacco: Never Used   Vaping Use   • Vaping Use: Never used   Substance and Sexual Activity   • Alcohol use: No   • Drug use: No   • Sexual activity: Not on file   Other Topics Concern   • Not on file   Social History Narrative   • Not on file     Social Determinants of Health     Financial Resource Strain:    • Difficulty of Paying Living Expenses:    Food Insecurity:    • Worried About Running Out of Food in the Last Year:    • Ran Out of Food in the Last Year:    Transportation Needs:    • Lack of Transportation (Medical):    • Lack of Transportation (Non-Medical):    Physical Activity:    • Days of Exercise per Week:    • Minutes of " "Exercise per Session:    Stress:    • Feeling of Stress :    Social Connections:    • Frequency of Communication with Friends and Family:    • Frequency of Social Gatherings with Friends and Family:    • Attends [employer] Services:    • Active Member of Clubs or Organizations:    • Attends Club or Organization Meetings:    • Marital Status:    Intimate Partner Violence:    • Fear of Current or Ex-Partner:    • Emotionally Abused:    • Physically Abused:    • Sexually Abused:            Intake/Output Summary (Last 24 hours) at 10/29/2021 1305  Last data filed at 10/29/2021 1200  Gross per 24 hour   Intake 2233.48 ml   Output --   Net 2233.48 ml        Physical Exam  Body mass index is 23.84 kg/m².  /66   Pulse 73   Temp 36.6 °C (97.8 °F) (Temporal)   Resp 17   Ht 1.905 m (6' 3\")   Wt 86.5 kg (190 lb 11.2 oz)   SpO2 95%   Vitals:    10/28/21 1700 10/28/21 2300 10/29/21 0500 10/29/21 1004   BP: 137/72 139/67 138/69 130/66   Pulse: 60 74 66 73   Resp: 18 18 18 17   Temp: 36.9 °C (98.4 °F) 37.1 °C (98.7 °F) 36.6 °C (97.8 °F) 36.6 °C (97.8 °F)   TempSrc: Oral Oral Oral Temporal   SpO2: 95% 94% 93% 95%   Weight:       Height:         Oxygen Therapy:  Pulse Oximetry: 95 %, O2 (LPM): 0, O2 Delivery Device: None - Room Air    General: Chronically ill-appearing, in no apparent distress  Eyes: nl conjunctiva  ENT: OP clear  Neck: JVP not elevated,  no carotid bruits  Lungs: normal respiratory effort, CTAB  Heart: RRR, no murmurs, no rubs or gallops, no edema bilateral lower extremities. No LV/RV heave on cardiac palpatation. 1+ bilateral radial pulses.  1+ bilateral dp pulses.   Abdomen: soft, non tender, non distended, no masses, normal bowel sounds.  No HSM.  Extremities/MSK: no clubbing, no cyanosis  Neurological: No focal sensory deficits  Psychiatric: Appropriate affect, A/O x 3  Skin: Warm extremities      Labs (personally reviewed and notable for):   Lab Results   Component Value Date/Time    SODIUM 135 " 10/28/2021 01:57 AM    POTASSIUM 3.6 10/28/2021 01:57 AM    CHLORIDE 103 10/28/2021 01:57 AM    CO2 23 10/28/2021 01:57 AM    GLUCOSE 136 (H) 10/28/2021 01:57 AM    BUN 14 10/28/2021 01:57 AM    CREATININE 0.72 10/28/2021 01:57 AM      Lab Results   Component Value Date/Time    WBC 4.7 (L) 10/28/2021 01:57 AM    RBC 4.02 (L) 10/28/2021 01:57 AM    HEMOGLOBIN 14.7 10/28/2021 01:57 AM    HEMATOCRIT 43.5 10/28/2021 01:57 AM    .2 (H) 10/28/2021 01:57 AM    MCH 36.6 (H) 10/28/2021 01:57 AM    MCHC 33.8 10/28/2021 01:57 AM    MPV 12.4 10/28/2021 01:57 AM    NEUTSPOLYS 85.90 (H) 10/24/2021 05:19 PM    LYMPHOCYTES 4.90 (L) 10/24/2021 05:19 PM    MONOCYTES 6.60 10/24/2021 05:19 PM    EOSINOPHILS 1.10 10/24/2021 05:19 PM    BASOPHILS 0.90 10/24/2021 05:19 PM    HYPOCHROMIA 1+ 08/30/2020 06:04 AM    ANISOCYTOSIS 2+ (A) 10/24/2021 05:19 PM      Lab Results   Component Value Date/Time    CHOLSTRLTOT 145 01/20/2021 09:32 AM    LDL 90 01/20/2021 09:32 AM    HDL 33 (A) 01/20/2021 09:32 AM    TRIGLYCERIDE 110 01/20/2021 09:32 AM       Lab Results   Component Value Date/Time    TROPONINT 19 10/27/2021 0254     Lab Results   Component Value Date/Time    NTPROBNP 1123 (H) 05/28/2021 2022         Cardiac Imaging and Procedures Review:    EKG dated 10/29/2021: My personal interpretation is sinus rhythm with incomplete left bundle branch block    Echo dated 5/29/2021:   CONCLUSIONS  Severely dilated LV and severely reduced LV and RVsystolic function.   There is global hypokinesis and apical akinesis. No evidence of LV   thrombus. LVEF estimated about 15-20%. No significant valuvlar   dysfunction. Compared to the images of the prior study done 1/2/2021   looks similar.    Nuclear Perfusion Imaging (5/29/2021):    NUCLEAR IMAGING INTERPRETATION   Severe left ventricular dysfunction. Ejection fraction measured at 19%.   Large prior apical infarct. No reversible ischemia.   ECG INTERPRETATION   Non-diagnsotic      Radiology test  Review:  MR-BRAIN-W/O   Final Result      1.  Mild cerebral atrophy.   2.  Chronic encephalomalacic changes right lateral occipital lobe, right parietal lobe, right posterior frontal lobe most consistent with old cerebral infarcts.   3.  Small area of chronic encephalomalacic change at the right anterior parasagittal frontal lobe which has developed since the prior exam. This may represent an old infarction versus chronic sequela of posttraumatic brain contusion.   4.  Small area of chronic encephalomalacic change at the inferior surface of the left temporal lobe which was not present on the prior exam from 2018. Lesion location and morphology is most suggestive of chronic posttraumatic brain contusion.   5.  No evidence of acute infarction, acute hemorrhage, or mass lesion.      [id number]-[id number]-P/O   Final Result      1.  There is cholelithiasis without pericholecystic, gallbladder wall thickening or biliary dilatation.   2.  There is no choledocholithiasis.   3.  There is a potential 2 cm cystic lesion in the pancreatic tail region/splenic hilum.      CT-CTA NECK WITH & W/O-POST PROCESSING   Final Result         1.  No high-grade stenosis, occlusion, or aneurysm appreciated.         CT-CTA HEAD WITH & W/O-POST PROCESS   Final Result         1.  Right proximal P2 occlusion, reconstitutes distally   2.  Changes of atrophy with nonspecific white matter changes, most commonly associated with small vessel ischemia.      These findings were discussed with the patient's clinician, Dr. Farooq, on 10/24/2021 10:12 PM.      DX-CHEST-PORTABLE (1 VIEW)   Final Result      No acute cardiac or pulmonary abnormality is noted.      MR-CERVICAL SPINE-WITH & W/O    (Results Pending)   MR-THORACIC SPINE-WITH & W/O    (Results Pending)   MR-LUMBAR SPINE-WITH & W/O    (Results Pending)   EC-ECHOCARDIOGRAM COMPLETE W/O CONT    (Results Pending)   EC-PAMELA W/O CONT    (Results Pending)         Impression and Medical Decision Making:  #MSSA  bacteremia  #Chronic HFrEF  #Ischemic cardiomyopathy  #CAD with multiple anterior STEMI in the past  #Paroxysmal atrial fibrillation  #Long-term anticoagulation on Eliquis  #S/p ICD  #Dyslipidemia  #Essential hypertension    Recommendations:  --PAMELA to rule out bacterial endocarditis.  Procedures risks versus benefits and alternatives have been discussed in detail with the patient who voices understanding and after discussion is in agreement to proceed.  If evidence of ICD lead infection, may need ICD extraction which has also been d/w him.  Patient is moderate risk for cardiovascular complications given HFrEF/ischemic cardiomyopathy but is currently compensated.  --Continue on anticoagulation with Eliquis 5 mg twice daily.  --Is currently on Toprol-XL 25 mg daily for heart failure therapy, continue  --Continue digoxin 125 mcg.  --Is on aspirin 325 mg, unclear benefit.  Recommend aspirin 81 mg daily instead.      Thank you for allowing me to participate in the care of this patient.  Please contact me with any questions.      Beau Gutierrez M.D.  Cardiologist, [facility]   [phone]    This note was generated using voice recognition software which has a small chance of producing errors of grammar and possibly content. I have made every reasonable attempt to find and correct any obvious errors, but expect that some may not be found prior to finalization of this note.

## 2021-10-29 NOTE — FACE TO FACE
Face to Face Supporting Documentation - Home Health    The encounter with this patient was in whole or in part the primary reason for home health admission.    Date of encounter:   Patient:                    MRN:                       YOB: 2019  Francesco Schulte  [id number]  1950     Home health to see patient for:  Skilled Nursing care for assessment, interventions & education, Physical Therapy evaluation and treatment and Occupational therapy evaluation and treatment    Skilled need for:  Comment: PICC line care, IV antibiotics, home infusion    Skilled nursing interventions to include:  Venous access care    Homebound status evidenced by:  Needs the assistance of another person in order to leave the home. Leaving home requires a considerable and taxing effort. There is a normal inability to leave the home.    Community Physician to provide follow up care: ALVINO Harman     Optional Interventions? No      I certify the face to face encounter for this home health care referral meets the CMS requirements and the encounter/clinical assessment with the patient was, in whole, or in part, for the medical condition(s) listed above, which is the primary reason for home health care. Based on my clinical findings: the service(s) are medically necessary, support the need for home health care, and the homebound criteria are met.  I certify that this patient has had a face to face encounter by myself.  Deb Peres M.D. - RIDDHII: [id number]
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
## 2021-10-29 NOTE — CARE PLAN
The patient is Stable - Low risk of patient condition declining or worsening    Shift Goals  Clinical Goals: Pt will have a bowel movement  Patient Goals: Rest  Family Goals: No family present    Progress made toward(s) clinical / shift goals:  Pt rested well. Had a bowel movement today.All needs met at this time.    Patient is not progressing towards the following goals:        Problem: Knowledge Deficit - Standard  Goal: Patient and family/care givers will demonstrate understanding of plan of care, disease process/condition, diagnostic tests and medications  Outcome: Progressing     Problem: Fall Risk  Goal: Patient will remain free from falls  Outcome: Progressing

## 2021-10-30 ENCOUNTER — APPOINTMENT (OUTPATIENT)
Dept: CARDIOLOGY | Facility: MEDICAL CENTER | Age: 71
DRG: 872 | End: 2021-10-30
Attending: STUDENT IN AN ORGANIZED HEALTH CARE EDUCATION/TRAINING PROGRAM
Payer: MEDICARE

## 2021-10-30 ENCOUNTER — ANESTHESIA (OUTPATIENT)
Dept: SURGERY | Facility: MEDICAL CENTER | Age: 71
DRG: 872 | End: 2021-10-30
Payer: MEDICARE

## 2021-10-30 ENCOUNTER — APPOINTMENT (OUTPATIENT)
Dept: CARDIOLOGY | Facility: MEDICAL CENTER | Age: 71
DRG: 872 | End: 2021-10-30
Attending: INTERNAL MEDICINE
Payer: MEDICARE

## 2021-10-30 ENCOUNTER — ANESTHESIA EVENT (OUTPATIENT)
Dept: SURGERY | Facility: MEDICAL CENTER | Age: 71
DRG: 872 | End: 2021-10-30
Payer: MEDICARE

## 2021-10-30 LAB
EKG IMPRESSION: NORMAL
EKG IMPRESSION: NORMAL
EXTERNAL QUALITY CONTROL: NORMAL
GLUCOSE BLD-MCNC: 142 MG/DL (ref 65–99)
GLUCOSE BLD-MCNC: 145 MG/DL (ref 65–99)
GLUCOSE BLD-MCNC: 153 MG/DL (ref 65–99)
GLUCOSE BLD-MCNC: 167 MG/DL (ref 65–99)
GLUCOSE BLD-MCNC: 323 MG/DL (ref 65–99)
LV EJECT FRACT MOD 4C 99902: 49.2
SARS-COV+SARS-COV-2 AG RESP QL IA.RAPID: POSITIVE
TROPONIN T SERPL-MCNC: 17 NG/L (ref 6–19)

## 2021-10-30 PROCEDURE — 99221 1ST HOSP IP/OBS SF/LOW 40: CPT | Performed by: INTERNAL MEDICINE

## 2021-10-30 PROCEDURE — 93308 TTE F-UP OR LMTD: CPT | Mod: 26 | Performed by: INTERNAL MEDICINE

## 2021-10-30 PROCEDURE — 93010 ELECTROCARDIOGRAM REPORT: CPT | Performed by: INTERNAL MEDICINE

## 2021-10-30 PROCEDURE — A9270 NON-COVERED ITEM OR SERVICE: HCPCS | Performed by: STUDENT IN AN ORGANIZED HEALTH CARE EDUCATION/TRAINING PROGRAM

## 2021-10-30 PROCEDURE — 700111 HCHG RX REV CODE 636 W/ 250 OVERRIDE (IP): Performed by: INTERNAL MEDICINE

## 2021-10-30 PROCEDURE — 700111 HCHG RX REV CODE 636 W/ 250 OVERRIDE (IP): Performed by: ANESTHESIOLOGY

## 2021-10-30 PROCEDURE — 160009 HCHG ANES TIME/MIN: Performed by: INTERNAL MEDICINE

## 2021-10-30 PROCEDURE — A9270 NON-COVERED ITEM OR SERVICE: HCPCS | Performed by: INTERNAL MEDICINE

## 2021-10-30 PROCEDURE — 99232 SBSQ HOSP IP/OBS MODERATE 35: CPT | Performed by: STUDENT IN AN ORGANIZED HEALTH CARE EDUCATION/TRAINING PROGRAM

## 2021-10-30 PROCEDURE — 700102 HCHG RX REV CODE 250 W/ 637 OVERRIDE(OP): Performed by: INTERNAL MEDICINE

## 2021-10-30 PROCEDURE — B246ZZ4 ULTRASONOGRAPHY OF RIGHT AND LEFT HEART, TRANSESOPHAGEAL: ICD-10-PCS | Performed by: INTERNAL MEDICINE

## 2021-10-30 PROCEDURE — 700102 HCHG RX REV CODE 250 W/ 637 OVERRIDE(OP): Performed by: STUDENT IN AN ORGANIZED HEALTH CARE EDUCATION/TRAINING PROGRAM

## 2021-10-30 PROCEDURE — 700101 HCHG RX REV CODE 250: Performed by: ANESTHESIOLOGY

## 2021-10-30 PROCEDURE — 84484 ASSAY OF TROPONIN QUANT: CPT

## 2021-10-30 PROCEDURE — 94760 N-INVAS EAR/PLS OXIMETRY 1: CPT

## 2021-10-30 PROCEDURE — 770001 HCHG ROOM/CARE - MED/SURG/GYN PRIV*

## 2021-10-30 PROCEDURE — 700105 HCHG RX REV CODE 258: Performed by: ANESTHESIOLOGY

## 2021-10-30 PROCEDURE — 87426 SARSCOV CORONAVIRUS AG IA: CPT | Performed by: INTERNAL MEDICINE

## 2021-10-30 PROCEDURE — 700102 HCHG RX REV CODE 250 W/ 637 OVERRIDE(OP)

## 2021-10-30 PROCEDURE — 93325 DOPPLER ECHO COLOR FLOW MAPG: CPT

## 2021-10-30 PROCEDURE — 99232 SBSQ HOSP IP/OBS MODERATE 35: CPT | Performed by: INTERNAL MEDICINE

## 2021-10-30 PROCEDURE — 82962 GLUCOSE BLOOD TEST: CPT

## 2021-10-30 PROCEDURE — 87040 BLOOD CULTURE FOR BACTERIA: CPT | Mod: 91

## 2021-10-30 PROCEDURE — 51798 US URINE CAPACITY MEASURE: CPT

## 2021-10-30 PROCEDURE — A9270 NON-COVERED ITEM OR SERVICE: HCPCS

## 2021-10-30 PROCEDURE — 36415 COLL VENOUS BLD VENIPUNCTURE: CPT

## 2021-10-30 RX ORDER — HYDROMORPHONE HYDROCHLORIDE 1 MG/ML
0.1 INJECTION, SOLUTION INTRAMUSCULAR; INTRAVENOUS; SUBCUTANEOUS
Status: DISCONTINUED | OUTPATIENT
Start: 2021-10-30 | End: 2021-10-30 | Stop reason: HOSPADM

## 2021-10-30 RX ORDER — HYDRALAZINE HYDROCHLORIDE 20 MG/ML
5 INJECTION INTRAMUSCULAR; INTRAVENOUS
Status: DISCONTINUED | OUTPATIENT
Start: 2021-10-30 | End: 2021-10-30 | Stop reason: HOSPADM

## 2021-10-30 RX ORDER — SODIUM CHLORIDE, SODIUM LACTATE, POTASSIUM CHLORIDE, CALCIUM CHLORIDE 600; 310; 30; 20 MG/100ML; MG/100ML; MG/100ML; MG/100ML
INJECTION, SOLUTION INTRAVENOUS
Status: DISCONTINUED | OUTPATIENT
Start: 2021-10-30 | End: 2021-10-30 | Stop reason: SURG

## 2021-10-30 RX ORDER — TAMSULOSIN HYDROCHLORIDE 0.4 MG/1
0.4 CAPSULE ORAL
Status: DISCONTINUED | OUTPATIENT
Start: 2021-10-30 | End: 2021-11-02 | Stop reason: HOSPADM

## 2021-10-30 RX ORDER — SODIUM CHLORIDE, SODIUM LACTATE, POTASSIUM CHLORIDE, CALCIUM CHLORIDE 600; 310; 30; 20 MG/100ML; MG/100ML; MG/100ML; MG/100ML
INJECTION, SOLUTION INTRAVENOUS CONTINUOUS
Status: DISCONTINUED | OUTPATIENT
Start: 2021-10-30 | End: 2021-10-30 | Stop reason: HOSPADM

## 2021-10-30 RX ORDER — DIPHENHYDRAMINE HYDROCHLORIDE 50 MG/ML
12.5 INJECTION INTRAMUSCULAR; INTRAVENOUS
Status: DISCONTINUED | OUTPATIENT
Start: 2021-10-30 | End: 2021-10-30 | Stop reason: HOSPADM

## 2021-10-30 RX ORDER — TAMSULOSIN HYDROCHLORIDE 0.4 MG/1
0.4 CAPSULE ORAL DAILY
COMMUNITY
End: 2022-02-28 | Stop reason: SDUPTHER

## 2021-10-30 RX ORDER — HYDROMORPHONE HYDROCHLORIDE 1 MG/ML
0.2 INJECTION, SOLUTION INTRAMUSCULAR; INTRAVENOUS; SUBCUTANEOUS
Status: DISCONTINUED | OUTPATIENT
Start: 2021-10-30 | End: 2021-10-30 | Stop reason: HOSPADM

## 2021-10-30 RX ORDER — HYDROMORPHONE HYDROCHLORIDE 1 MG/ML
0.4 INJECTION, SOLUTION INTRAMUSCULAR; INTRAVENOUS; SUBCUTANEOUS
Status: DISCONTINUED | OUTPATIENT
Start: 2021-10-30 | End: 2021-10-30 | Stop reason: HOSPADM

## 2021-10-30 RX ORDER — KETAMINE HYDROCHLORIDE 50 MG/ML
INJECTION, SOLUTION INTRAMUSCULAR; INTRAVENOUS PRN
Status: DISCONTINUED | OUTPATIENT
Start: 2021-10-30 | End: 2021-10-30 | Stop reason: SURG

## 2021-10-30 RX ORDER — SODIUM CHLORIDE, SODIUM LACTATE, POTASSIUM CHLORIDE, CALCIUM CHLORIDE 600; 310; 30; 20 MG/100ML; MG/100ML; MG/100ML; MG/100ML
INJECTION, SOLUTION INTRAVENOUS CONTINUOUS
Status: ACTIVE | OUTPATIENT
Start: 2021-10-30 | End: 2021-10-30

## 2021-10-30 RX ORDER — ONDANSETRON 2 MG/ML
4 INJECTION INTRAMUSCULAR; INTRAVENOUS
Status: DISCONTINUED | OUTPATIENT
Start: 2021-10-30 | End: 2021-10-30 | Stop reason: HOSPADM

## 2021-10-30 RX ADMIN — LIDOCAINE HYDROCHLORIDE 15 ML: 20 SOLUTION OROPHARYNGEAL at 02:44

## 2021-10-30 RX ADMIN — APIXABAN 5 MG: 5 TABLET, FILM COATED ORAL at 17:14

## 2021-10-30 RX ADMIN — OXYCODONE 5 MG: 5 TABLET ORAL at 02:56

## 2021-10-30 RX ADMIN — INSULIN HUMAN 6 UNITS: 100 INJECTION, SOLUTION PARENTERAL at 21:50

## 2021-10-30 RX ADMIN — FINASTERIDE 5 MG: 5 TABLET, FILM COATED ORAL at 06:15

## 2021-10-30 RX ADMIN — ASPIRIN 325 MG ORAL TABLET 325 MG: 325 PILL ORAL at 06:15

## 2021-10-30 RX ADMIN — TAMSULOSIN HYDROCHLORIDE 0.4 MG: 0.4 CAPSULE ORAL at 12:42

## 2021-10-30 RX ADMIN — Medication 5 MG: at 21:46

## 2021-10-30 RX ADMIN — LIDOCAINE HYDROCHLORIDE 15 ML: 20 SOLUTION OROPHARYNGEAL at 18:03

## 2021-10-30 RX ADMIN — OXYCODONE 5 MG: 5 TABLET ORAL at 03:07

## 2021-10-30 RX ADMIN — PROPOFOL 50 MCG/KG/MIN: 10 INJECTION, EMULSION INTRAVENOUS at 08:53

## 2021-10-30 RX ADMIN — OXYCODONE 5 MG: 5 TABLET ORAL at 06:23

## 2021-10-30 RX ADMIN — OXYCODONE 5 MG: 5 TABLET ORAL at 17:19

## 2021-10-30 RX ADMIN — INSULIN HUMAN 2 UNITS: 100 INJECTION, SOLUTION PARENTERAL at 17:11

## 2021-10-30 RX ADMIN — GUAIFENESIN 200 MG: 100 SOLUTION ORAL at 03:14

## 2021-10-30 RX ADMIN — CEFAZOLIN 2 G: 1 INJECTION, POWDER, FOR SOLUTION INTRAVENOUS at 13:26

## 2021-10-30 RX ADMIN — KETAMINE HYDROCHLORIDE 15 MG: 50 INJECTION INTRAMUSCULAR; INTRAVENOUS at 08:53

## 2021-10-30 RX ADMIN — SODIUM CHLORIDE, POTASSIUM CHLORIDE, SODIUM LACTATE AND CALCIUM CHLORIDE: 600; 310; 30; 20 INJECTION, SOLUTION INTRAVENOUS at 08:47

## 2021-10-30 RX ADMIN — METOPROLOL SUCCINATE 25 MG: 25 TABLET, EXTENDED RELEASE ORAL at 06:15

## 2021-10-30 RX ADMIN — CEFAZOLIN 2 G: 1 INJECTION, POWDER, FOR SOLUTION INTRAVENOUS at 06:15

## 2021-10-30 RX ADMIN — APIXABAN 5 MG: 5 TABLET, FILM COATED ORAL at 06:15

## 2021-10-30 RX ADMIN — GUAIFENESIN 200 MG: 100 SOLUTION ORAL at 13:29

## 2021-10-30 RX ADMIN — INSULIN GLARGINE 6 UNITS: 100 INJECTION, SOLUTION SUBCUTANEOUS at 06:15

## 2021-10-30 RX ADMIN — DIGOXIN 125 MCG: 125 TABLET ORAL at 17:14

## 2021-10-30 RX ADMIN — CEFAZOLIN 2 G: 1 INJECTION, POWDER, FOR SOLUTION INTRAVENOUS at 21:46

## 2021-10-30 ASSESSMENT — ENCOUNTER SYMPTOMS
FEVER: 0
CHILLS: 0
SHORTNESS OF BREATH: 0
COUGH: 0
BACK PAIN: 1
SPUTUM PRODUCTION: 0
EYES NEGATIVE: 1
FOCAL WEAKNESS: 1
VOMITING: 0
ABDOMINAL PAIN: 1
NAUSEA: 0
WEAKNESS: 1

## 2021-10-30 ASSESSMENT — PAIN DESCRIPTION - PAIN TYPE
TYPE: ACUTE PAIN

## 2021-10-30 ASSESSMENT — PAIN SCALES - GENERAL: PAIN_LEVEL: 0

## 2021-10-30 NOTE — PROGRESS NOTES
Rapid response called on this patient for chest pain.    Patient evaluated at bedside vital signs are unremarkable.  Patient reports that the pain is more in the chest wall rather than retrosternal in position.  Pain is worse with palpation reported as squeezing in character.  Worse with deep breathing.  Worse with changing the position in bed.  We will obtain EKG, troponin to rule out cardiac causes.

## 2021-10-30 NOTE — PROGRESS NOTES
Infectious Disease Progress Note    Author: Pema Asif M.D. Date & Time of service: 10/30/2021  11:09 AM    Chief Complaint:  MSSA sepsis    Interval History:  71 y.o. diabetic male admitted 10/24/2021 for weakness, dizziness, palpitations.+ AICD, Afib on apixaban, BPH, CVA found to have new CVA and above  10/26 AF WBC more alert today-still has LE weakness and can't walk  10/27 AF WBC 4.9 repeat blood cxs +staph No new complaints ECHO not done yet  10/28 AF sleeping soundly at time of rounds No echo yet  10/29 AF WBC 4.7 c/o back pain today-not controlled with meds  10/30 AF WBC not done plan for Echo today. Episode chest pain note, pleuritic. EKG no ischemia    Labs Reviewed, Medications Reviewed and Radiology Reviewed.    Review of Systems:  Review of Systems   Constitutional: Negative for fever.   Respiratory: Negative for cough and shortness of breath.    Gastrointestinal: Negative for nausea and vomiting.   Musculoskeletal: Positive for back pain.   Skin: Negative for rash.   Neurological: Positive for focal weakness.   All other systems reviewed and are negative.      Hemodynamics:  Temp (24hrs), Av.5 °C (97.7 °F), Min:36.4 °C (97.6 °F), Max:36.6 °C (97.9 °F)  Temperature: 36.6 °C (97.9 °F)  Pulse  Av.5  Min: 60  Max: 103   Blood Pressure : 140/73       Physical Exam:  Physical Exam  Vitals and nursing note reviewed.   Constitutional:       General: He is not in acute distress.     Appearance: He is not ill-appearing, toxic-appearing or diaphoretic.   HENT:      Nose: No rhinorrhea.      Mouth/Throat:      Pharynx: No oropharyngeal exudate.   Eyes:      General: No scleral icterus.     Extraocular Movements: Extraocular movements intact.      Pupils: Pupils are equal, round, and reactive to light.   Cardiovascular:      Rate and Rhythm: Normal rate. Rhythm irregular.      Heart sounds: Murmur heard.        Comments: AICD nontender  Pulmonary:      Effort: Pulmonary effort is normal. No  respiratory distress.      Breath sounds: No stridor.   Abdominal:      General: There is no distension.      Palpations: Abdomen is soft.      Tenderness: There is no abdominal tenderness.   Musculoskeletal:      Cervical back: Neck supple. No tenderness.      Right lower leg: No edema.      Left lower leg: No edema.   Skin:     Coloration: Skin is not jaundiced.      Findings: Bruising present.   Neurological:      Mental Status: He is alert.      Sensory: No sensory deficit.         Meds:    Current Facility-Administered Medications:   •  hyoscyamine-lidocaine-Maalox (GI Cocktail)  •  LR  •  [START ON 10/31/2021] aspirin EC  •  Pharmacy Consult Request  •  [DISCONTINUED] oxyCODONE immediate-release **OR** oxyCODONE immediate-release **OR** HYDROmorphone  •  guaiFENesin  •  acetaminophen  •  ceFAZolin  •  NS  •  senna-docusate **AND** polyethylene glycol/lytes **AND** magnesium hydroxide **AND** bisacodyl  •  enalaprilat  •  labetalol  •  insulin regular **AND** POC blood glucose manual result **AND** NOTIFY MD and PharmD **AND** glucose **AND** dextrose 50%  •  apixaban  •  digoxin  •  finasteride  •  metoprolol SR  •  insulin glargine  •  melatonin  •  ondansetron  •  meclizine  •  LORazepam    Labs:  Recent Labs     10/28/21  0157   WBC 4.7*   RBC 4.02*   HEMOGLOBIN 14.7   HEMATOCRIT 43.5   .2*   MCH 36.6*   RDW 64.3*   PLATELETCT 110*   MPV 12.4     Recent Labs     10/28/21  0157 10/29/21  1707   SODIUM 135 135   POTASSIUM 3.6 3.7   CHLORIDE 103 102   CO2 23 19*   GLUCOSE 136* 224*   BUN 14 13     Recent Labs     10/28/21  0157 10/29/21  1707   ALBUMIN  --  2.8*   TBILIRUBIN  --  0.6   ALKPHOSPHAT  --  111*   TOTPROTEIN  --  7.3   ALTSGPT  --  10   ASTSGOT  --  16   CREATININE 0.72 0.96       Imaging:  CT-CTA HEAD WITH & W/O-POST PROCESS    Result Date: 10/25/2021  10/24/2021 9:03 PM HISTORY/REASON FOR EXAM:  Dizziness, dehydration or hypotension; vertigo new onset, vasculopath TECHNIQUE/EXAM  DESCRIPTION: CT angiogram of the [place] without and with contrast.  Initial precontrast images were obtained of the head from the skull base through the vertex.  Postcontrast images were obtained of the [place] following the power injection of nonionic contrast at 5.0 mL/sec. Thin-section helical images were obtained with overlapping reconstruction interval. Coronal and sagittal multiplanar volume reformats were generated.  3D angiographic images were reviewed on PACS.  Maximum intensity projection (MIP) images were generated and reviewed. 80 mL of Omnipaque 350 nonionic contrast was injected intravenously. Low dose optimization technique was utilized for this CT exam including automated exposure control and adjustment of the mA and/or kV according to patient size. COMPARISON:  August 9, 2018. FINDINGS: The posterior circulation shows the distal vertebral arteries to be patent. Hypoplastic distal left vertebral artery is seen. Atherosclerotic changes in the distal right vertebral artery are seen, resulting in less than 50% stenosis The vertebrobasilar confluence is intact. The basilar artery is patent. Right proximal P2 occlusion is seen which reconstitutes distally. Persistent fetal morphology of the left posterior cerebral artery is seen. Atherosclerotic changes are seen of the bilateral intracranial internal carotid arteries resulting in less than 50% stenosis, otherwise the anterior circulation shows no stenotic or occlusive lesion. No aneurysm is evident about the [place]. Mild diffuse parenchymal volume loss is seen. There is mild periventricular and subcortical white matter low-attenuation changes. Low-density changes in the right parietal occipital lobe are seen compatible with encephalomalacia. 3D angiographic/MIP images of the vasculature confirm the vascular findings as described above.     1.  Right proximal P2 occlusion, reconstitutes distally 2.  Changes of atrophy with  nonspecific white matter changes, most commonly associated with small vessel ischemia. These findings were discussed with the patient's clinician, Dr. Farooq, on 10/24/2021 10:12 PM.    CT-CTA NECK WITH & W/O-POST PROCESSING    Result Date: 10/24/2021    10/24/2021 9:03 PM HISTORY/REASON FOR EXAM:  Vertigo new onset, vasculopath TECHNIQUE/EXAM DESCRIPTION:  CT angiogram of the neck with contrast. Postcontrast images were obtained of the neck from the great vessels through the skull base following the power injection of nonionic contrast at 5.0 mL/sec. Thin-section helical images were obtained with overlapping reconstruction interval. Coronal and oblique multiplanar volume reformats were generated. Cervical internal carotid artery percent stenosis is calculated using the standard method according to the NASCET criteria wherein a segment of uniform caliber mid or distal cervical internal carotid is used as the reference denominator. 3D angiographic images were reviewed on PACS.  Maximum intensity projection (MIP) images were generated and reviewed 80 mL of Omnipaque 350 nonionic contrast was injected intravenously. Low dose optimization technique was utilized for this CT exam including automated exposure control and adjustment of the mA and/or kV according to patient size. COMPARISON: August 9, 2018 FINDINGS: 1.4 cm peripherally calcified left thyroid cyst is seen. Aortic arch: bovine type branching pattern. There is atherosclerotic plaque of the aorta. Right common carotid artery: Patent Right internal carotid artery: Atherosclerotic plaque without significant stenosis (less than 50%). Left common carotid artery is patent. Left internal carotid artery: Atherosclerotic plaque without significant stenosis (less than 50%). The right vertebral artery is patent without dissection or stenosis. The left vertebral artery is patent without dissection or stenosis. Vertebrobasilar confluence: The vertebrobasilar confluence  "appears normal. Lung apices are clear The soft tissues of the neck are within normal limits. 3D angiographic/MIP images of the vasculature confirm the vascular findings as described above.     1.  No high-grade stenosis, occlusion, or aneurysm appreciated.     DX-CHEST-PORTABLE (1 VIEW)    Result Date: 10/24/2021  10/24/2021 5:30 PM HISTORY/REASON FOR EXAM:  Chest Pain TECHNIQUE/EXAM DESCRIPTION AND NUMBER OF VIEWS: Single portable view of the chest. COMPARISON:  7/29/2021, 5/28/2021 FINDINGS: Left bipolar transvenous pacing device remains in place. The cardiac silhouette is within normal limits. No infiltrates or consolidations are noted. No pleural effusion is noted.     No acute cardiac or pulmonary abnormality is noted.      Micro:  Results     Procedure Component Value Units Date/Time    BLOOD CULTURE [[id number]] Collected: 10/30/21 0109    Order Status: Completed Specimen: Blood from Peripheral Updated: 10/30/21 0734    Narrative:      Per Hospital Policy: Only change Specimen Src: to \"Line\" if  specified by physician order.    BLOOD CULTURE [[id number]] Collected: 10/30/21 0109    Order Status: Completed Specimen: Blood from Peripheral Updated: 10/30/21 0734    Narrative:      Per Hospital Policy: Only change Specimen Src: to \"Line\" if  specified by physician order.    BLOOD CULTURE [[id number]] Collected: 10/28/21 0157    Order Status: Completed Specimen: Blood from Peripheral Updated: 10/29/21 0744     Significant Indicator NEG     Source BLD     Site PERIPHERAL     Culture Result No Growth  Note: Blood cultures are incubated for 5 days and  are monitored continuously.Positive blood cultures  are called to the RN and reported as soon as  they are identified.  Blood culture testing and Gram stain, if indicated, are  performed at [facility], [address] [place] [address].  Positive blood cultures are  sent to [facility], [address] [place] [address], for " "organism identification and  susceptibility testing.      Narrative:      Per Hospital Policy: Only change Specimen Src: to \"Line\" if  specified by physician order.  Right hand    BLOOD CULTURE [[id number]] Collected: 10/28/21 0157    Order Status: Completed Specimen: Blood from Peripheral Updated: 10/29/21 0744     Significant Indicator NEG     Source BLD     Site PERIPHERAL     Culture Result No Growth  Note: Blood cultures are incubated for 5 days and  are monitored continuously.Positive blood cultures  are called to the RN and reported as soon as  they are identified.  Blood culture testing and Gram stain, if indicated, are  performed at [facility], [address] [place] [address].  Positive blood cultures are  sent to [facility], [address] [place] [address], for organism identification and  susceptibility testing.      Narrative:      Per Hospital Policy: Only change Specimen Src: to \"Line\" if  specified by physician order.  Left wriast    BLOOD CULTURE [[id number]]  (Abnormal) Collected: 10/26/21 0807    Order Status: Completed Specimen: Blood from Peripheral Updated: 10/28/21 1113     Significant Indicator POS     Source BLD     Site PERIPHERAL     Culture Result Growth detected by Bactec instrument. 10/27/2021  05:08      Staphylococcus aureus  See previous culture for sensitivity report.      Narrative:      CALL  Russell  [facility] tel. [phone],  CALLED  [facility] tel. [phone] 10/27/2021, 05:09, RB PERF. RESULTS CALLED  TO:[id number], RN  Per Hospital Policy: Only change Specimen Src: to \"Line\" if  specified by physician order.  Right Hand    BLOOD CULTURE [[id number]]  (Abnormal) Collected: 10/26/21 0807    Order Status: Completed Specimen: Blood from Peripheral Updated: 10/28/21 1109     Significant Indicator POS     Source BLD     Site PERIPHERAL     Culture Result Growth detected by Bactec instrument. 10/27/2021  08:43      Staphylococcus aureus  See previous " "culture for sensitivity report.      Narrative:      Per Hospital Policy: Only change Specimen Src: to \"Line\" if  specified by physician order.  Right Wrist    BLOOD CULTURE [[id number]]  (Abnormal) Collected: 10/24/21 1840    Order Status: Completed Specimen: Blood from Peripheral Updated: 10/27/21 1119     Significant Indicator POS     Source BLD     Site PERIPHERAL     Culture Result Growth detected by Bactec instrument. 10/25/2021  08:14  Blood culture testing and Gram stain, if indicated, are  performed at [facility], [address] [place] [address].  Positive blood cultures are  sent to [facility], [address] [place] [address], for organism identification and  susceptibility testing.        Staphylococcus aureus  See previous culture for sensitivity report.      Narrative:      2 of 2 blood culture x2  Sites order. Per Hospital Policy:  Only change Specimen Src: to \"Line\" if specified by physician  order.  Left AC    BLOOD CULTURE [[id number]]  (Abnormal)  (Susceptibility) Collected: 10/24/21 1845    Order Status: Completed Specimen: Blood from Peripheral Updated: 10/27/21 1119     Significant Indicator POS     Source BLD     Site PERIPHERAL     Culture Result Growth detected by Bactec instrument. 10/25/2021  07:57  Staphylococcus aureus (methicillin sensitive)  detected by PCR.        Staphylococcus aureus    Narrative:      CALL  Russell  [facility] tel. [phone],  CALLED  [facility] [place] [facility] tel. [phone] 10/25/2021, 13:01, RB PERF. RESULTS CALLED  TO:Michael pharm  1 of 2 for Blood Culture x 2 sites order. Per Hospital  Policy: Only change Specimen Src: to \"Line\" if specified by  physician order.  Left AC    Susceptibility     Staphylococcus aureus (1)     Antibiotic Interpretation Microscan Method Status    Azithromycin Sensitive <=2 mcg/mL JAVI Final    Clindamycin Sensitive <=0.25 mcg/mL JAVI Final    Cefazolin Sensitive <=8 mcg/mL JAVI Final    Cefepime Sensitive <=4 mcg/mL JAVI " Final    Ceftaroline Sensitive <=0.5 mcg/mL AJVI Final    Daptomycin Sensitive <=0.5 mcg/mL JAVI Final    Ampicillin/sulbactam Sensitive <=8/4 mcg/mL JAVI Final    Erythromycin Sensitive <=0.25 mcg/mL JAVI Final    Vancomycin Sensitive 1 mcg/mL JAVI Final    Oxacillin Sensitive 1 mcg/mL JAVI Final    Pip/Tazobactam Sensitive <=8 mcg/mL JAVI Final    Trimeth/Sulfa Sensitive <=0.5/9.5 mcg/mL JAVI Final    Tetracycline Sensitive <=4 mcg/mL JAVI Final                   URINALYSIS (UA) [[id number]]  (Abnormal) Collected: 10/24/21 1840    Order Status: Completed Specimen: Urine, Clean Catch Updated: 10/24/21 1958     Color Yellow     Character Clear     Specific Gravity 1.015     Ph 5.5     Glucose >=1000 mg/dL      Ketones Trace mg/dL      Protein Negative mg/dL      Bilirubin Negative     Nitrite Negative     Leukocyte Esterase Negative     Occult Blood Moderate     Micro Urine Req Microscopic          Assessment:  Active Hospital Problems    Diagnosis    • *Vertigo [R42]    • Bacteremia [R78.81]    • Ischemic cardiomyopathy [I25.5]    • Orthostatic hypotension [I95.1]    • Essential hypertension [I10]    • History of stroke- old right parietal/occipital [Z86.73]    • CAD (coronary artery disease) [I25.10]    • Type 2 diabetes mellitus without complication, with long-term current use of insulin (HCC) [E11.9, Z79.4]    • Paroxysmal A-fib (HCC) [I48.0]    • Benign prostatic hyperplasia without lower urinary tract symptoms [N40.0]        Plan:  MSSA sepsis  AICD in place Additional work  Afebrile  No leukocytosis-mild leukopenia  BCxs + MSSA 10/24 and 10/26  Repeat Bcxs every 48 hours until neg-repeat 10/28 prelim neg  PICC or midline when Bcxs neg 48 hours  Plan for PAMELA as he has AICD  Given mult +blood cxs AICD is likely infected and will require explantation  Continue cefazolin  Anticipate 6 weeks IV abx from date of negative blood cxs if no new complications    New back pain  Bilateral shoulder/arm pain  Needs MRI C,T, and L  spine    CVA  Mult CVA by MRI, chronic  No bleed    Immunosuppressed  PV  Lupus      Diabetes  Keep BS under 150 to help control current infection      [facility]

## 2021-10-30 NOTE — ANESTHESIA TIME REPORT
Anesthesia Start and Stop Event Times     Date Time Event    10/30/2021 0847 Anesthesia Start     0933 Anesthesia Stop        Responsible Staff  10/30/21    Name Role Begin End    Alessandro Joseph M.D. Anesth 0847 0933        Preop Diagnosis (Free Text):  Pre-op Diagnosis      R/O Bacterial Endocarditis        Preop Diagnosis (Codes):    Premium Reason  A. 3PM - 7AM    Comments: asa 4e

## 2021-10-30 NOTE — PROGRESS NOTES
"Patient reporting new onset chest pain. Patient described pain as \"something I have never experienced before\". Rapid response called. New orders received and implemented.  "

## 2021-10-30 NOTE — OR NURSING
0923: To PACU from OR via gurney, respirations spontaneous and non-labored. Pt denies pain and nausea.  0935: Pt sleeping, not roused at this time  0950: pt awake, denies pain or nausea. Meets criteria to transfer to floor.   1000: Report called to Isa JORGENSEN.   1006: Pt transported with transport, chart and glasses with the patient.

## 2021-10-30 NOTE — PROGRESS NOTES
Assumed care of patient at 1915, received bedside report from day shift RN. Bed is locked and in lowest position with call light within reach. Treaded socks in place. Patient updated on plan of care. Patient medicated for pain in throat.  White board updated. Pt A&Ox4. Patient's breathing pattern is unlabored.  All needs met at this time.    Daughter, Aretha, updated on events during the day and rapid response. Daughter updated on plan of care and would like to be called during the day tomorrow- will inform day shift RN.

## 2021-10-30 NOTE — PROGRESS NOTES
"Hospital Medicine Daily Progress Note    Date of Service  10/30/2021    Chief Complaint  Francesco Schulte is a 71 y.o. male admitted 10/24/2021 with dizziness    Hospital Course  A 71-year-old man with ho DM, HLD, HFrEF, s/p AICD, Afib (AC with apixaban, BPH, CVA presented with dizziness, lower extremity weakness for 2 days.   CTA showed rigth proximal P2 occlusion. Neurology recommended MRI brain. Blood cultures positive for MSSA. ID recommended continue cefazolin, anticipate 4 weeks of antibiotics.     10/26: Patient reports dizziness on standing, nausea, no strength on b/l legs. Afebrile, hemodynamically stable. On room air. Patient had some pancreatic mass, and supposed to get MRI of pancreas in 2 weeks. MRCP ordered. No stool x2 days. Blood cultures from 10/24 positive for S.aureus     10/27: Patient had chest pain at night, pleuritic. Troponin negative. ECG showed sinus rhythm, incomplete LLB, LVH. Afebrile, hemodynamically stable. Na 133. Tired. No chest pain in the morning. Now able to move legs.     10/28: PT, OT recommended HH. Afebrile, hemodynamically stable.  MRI spine ordered to r/o spinal infection. ID following, recommendations appreciated.     10/29: Patient reports shoulders, arms, back pain, cant hardly move, \"lot of pain\". Afebrile, hemodynamically stable. MRI on Monday. PAMELA tomorrow, NPO midnight. Ordered TTE    Interval Problem Update  10/30: Patient had chest pain last evening, pleuritic in character, troponin x2 negative. No ECG changes. Afebrile, hemodynamically stable. SARS-COV antigen positive. Ordered COVID 19 PCR test.  This morning patient reports b/l shoulder, b/l arm, theeth tightness and pain, chest pain, cant feel right arm.  Patient underwent PAMELA and TTE today.    I have personally seen and examined the patient at bedside. I discussed the plan of care with patient, bedside RN, charge RN,  and pharmacy.    Consultants/Specialty  cardiology and infectious " disease    Code Status  Full Code    Disposition  Patient is not medically cleared.   Anticipate discharge to to home with close outpatient follow-up.  I have placed the appropriate orders for post-discharge needs.    Review of Systems  Review of Systems   Constitutional: Positive for malaise/fatigue. Negative for chills and fever.   HENT: Negative.    Eyes: Negative.    Respiratory: Negative for cough, sputum production and shortness of breath.    Cardiovascular: Positive for chest pain. Negative for leg swelling.   Gastrointestinal: Positive for abdominal pain. Negative for nausea and vomiting.   Genitourinary: Negative for dysuria.   Musculoskeletal: Positive for joint pain.   Skin: Negative.    Neurological: Positive for weakness.        Physical Exam  Temp:  [36.4 °C (97.6 °F)-36.6 °C (97.9 °F)] 36.6 °C (97.9 °F)  Pulse:  [60-79] 60  Resp:  [13-22] 16  BP: (118-170)/(67-96) 140/73  SpO2:  [94 %-99 %] 94 %    Physical Exam  Vitals and nursing note reviewed.   Constitutional:       Appearance: He is ill-appearing.   HENT:      Head: Normocephalic.      Mouth/Throat:      Mouth: Mucous membranes are moist.      Pharynx: Oropharynx is clear.   Eyes:      Pupils: Pupils are equal, round, and reactive to light.   Cardiovascular:      Rate and Rhythm: Normal rate and regular rhythm.   Pulmonary:      Effort: Pulmonary effort is normal. No respiratory distress.      Breath sounds: No wheezing or rales.   Abdominal:      General: Abdomen is flat. Bowel sounds are normal. There is no distension.   Musculoskeletal:         General: Normal range of motion.      Cervical back: Normal range of motion.      Comments: Reproducible left chest tenderness.     Skin:     General: Skin is warm.   Neurological:      General: No focal deficit present.      Mental Status: He is alert and oriented to person, place, and time.         Fluids    Intake/Output Summary (Last 24 hours) at 10/30/2021 1054  Last data filed at 10/30/2021  0941  Gross per 24 hour   Intake 490 ml   Output --   Net 490 ml       Laboratory  Recent Labs     10/28/21  0157   WBC 4.7*   RBC 4.02*   HEMOGLOBIN 14.7   HEMATOCRIT 43.5   .2*   MCH 36.6*   MCHC 33.8   RDW 64.3*   PLATELETCT 110*   MPV 12.4     Recent Labs     10/28/21  0157 10/29/21  1707   SODIUM 135 135   POTASSIUM 3.6 3.7   CHLORIDE 103 102   CO2 23 19*   GLUCOSE 136* 224*   BUN 14 13   CREATININE 0.72 0.96   CALCIUM 8.4 8.6                   Imaging  DX-CHEST-PORTABLE (1 VIEW)   Final Result      Enlarged cardiac silhouette and interstitial prominence of pleural fluid suggesting congestive heart failure.      MR-BRAIN-W/O   Final Result      1.  Mild cerebral atrophy.   2.  Chronic encephalomalacic changes right lateral occipital lobe, right parietal lobe, right posterior frontal lobe most consistent with old cerebral infarcts.   3.  Small area of chronic encephalomalacic change at the right anterior parasagittal frontal lobe which has developed since the prior exam. This may represent an old infarction versus chronic sequela of posttraumatic brain contusion.   4.  Small area of chronic encephalomalacic change at the inferior surface of the left temporal lobe which was not present on the prior exam from 2018. Lesion location and morphology is most suggestive of chronic posttraumatic brain contusion.   5.  No evidence of acute infarction, acute hemorrhage, or mass lesion.      ED-GTSBFRO-C/O   Final Result      1.  There is cholelithiasis without pericholecystic, gallbladder wall thickening or biliary dilatation.   2.  There is no choledocholithiasis.   3.  There is a potential 2 cm cystic lesion in the pancreatic tail region/splenic hilum.      CT-CTA NECK WITH & W/O-POST PROCESSING   Final Result         1.  No high-grade stenosis, occlusion, or aneurysm appreciated.         CT-CTA HEAD WITH & W/O-POST PROCESS   Final Result         1.  Right proximal P2 occlusion, reconstitutes distally   2.   Changes of atrophy with nonspecific white matter changes, most commonly associated with small vessel ischemia.      These findings were discussed with the patient's clinician, Dr. Farooq, on 10/24/2021 10:12 PM.      DX-CHEST-PORTABLE (1 VIEW)   Final Result      No acute cardiac or pulmonary abnormality is noted.      MR-CERVICAL SPINE-WITH & W/O    (Results Pending)   MR-THORACIC SPINE-WITH & W/O    (Results Pending)   MR-LUMBAR SPINE-WITH & W/O    (Results Pending)   EC-ECHOCARDIOGRAM COMPLETE W/O CONT    (Results Pending)   EC-PAMELA W/O CONT    (Results Pending)        Assessment/Plan  * Bacteremia  Assessment & Plan  MSSA  Patient with incidental blood cultures positive for gram positive cocci concerning for staph species  No clear source of infection identified, UA negative and CXR nonrevealing  ID following, recommendations appreciated  Continue cefazolin    Ischemic cardiomyopathy- (present on admission)  Assessment & Plan  -Per cardiology notes. Status post AICD 09/2021  -Last echo 05/2021 with a EF of 15-20%  -Continue metoprolol and digoxin. Not on ACEI/ARB due to intolerance per cardiology notes  -Patient is to be on spironolactone per cardiology note 09/2021 however says is not taking it  -Med rec fluids bumetanide, patient states he is not on any diuretic  - no active volume overload at this time     Vertigo- (present on admission)  Assessment & Plan  -pt states he has a hx of vertigo which had resolved, complaining of vertigo now, dizziness like room spinning with rapid movement of head to one side  -pt with hx of CVA and CAD. Bilateral nystagmus on exam concerning for central cause   -Annette-Hallpike Maneuver for further eval not done since pt too nauseous   -meclizine and zofran for symptom control  -CT head and neck shows right PCA occlusion however unclear if acute or chronic as patient has had previous posterior circulation stroke  MRI brain showed chronic multiple infarctions  Neurology recommended to  resume anticoagulation for stroke prevention    Orthostatic hypotension- (present on admission)  Assessment & Plan  -Reported history of orthostatic hypotension per notes, currently complaining of dizziness upon change of position consistent with orthostatic hypotension  -check orthostatic vitals   -Would be difficult to keep patient hydrated due to history of HFrEF    Essential hypertension- (present on admission)  Assessment & Plan  -Continue metoprolol    History of stroke- old right parietal/occipital- (present on admission)  Assessment & Plan  Continue aspirin. Patient not on statin due to history of statin intolerance    CAD (coronary artery disease)- (present on admission)  Assessment & Plan  Continue aspirin. Patient not on statin due to history of statin intolerance  Goal directed medical therapy as tolerated  No active chest pain at this time     Type 2 diabetes mellitus without complication, with long-term current use of insulin (HCC)- (present on admission)  Assessment & Plan  -Continue long-acting insulin  -Insulin sliding scale and hypoglycemia protocol while in hospital    Paroxysmal A-fib (HCC)- (present on admission)  Assessment & Plan  -Rate controlled, not in RVR  -Continue metoprolol, digoxin   -resumed apixaban per neurology on 10/27    Benign prostatic hyperplasia without lower urinary tract symptoms- (present on admission)  Assessment & Plan  Continue finasteride       VTE prophylaxis: therapeutic anticoagulation with apixaban    I have performed a physical exam and reviewed and updated ROS and Plan today (10/30/2021). In review of yesterday's note (10/29/2021), there are no changes except as documented above.

## 2021-10-30 NOTE — ANESTHESIA PREPROCEDURE EVALUATION
Anes H&P:  PAST MEDICAL HISTORY:   71 y.o. male who presents for Procedure(s):  ECHOCARDIOGRAM, TRANSESOPHAGEAL.  He has current and past medical problems significant for:       Past Medical History:   Diagnosis Date   • Agent orange exposure    • Anesthesia     resistant to pain meds   • Cancer (HCC)     polycythemia vera   • Diabetes (HCC)     insulin and oral meds   • Family history of punctured lung 2002    left lung   • Heart attack (HCC) 03/2017   • Hemorrhagic disorder (HCC)     on blood thinners   • High cholesterol    • Ischemic cardiomyopathy    • Kidney stones     hx of kidney stones   • Orthostatic hypotension 3/29/2018   • Pain     headache pain   • Polycythemia vera(238.4)    • Stroke (HCC) 2016    r/t afib; eye issues resolved afterward   • Urinary bladder disorder     enlarged prostate, weak stream, difficulty urinating   • Vertigo        SMOKING/ALCOHOL/RECREATIONAL DRUG USE:  Social History     Tobacco Use   • Smoking status: Never Smoker   • Smokeless tobacco: Never Used   Vaping Use   • Vaping Use: Never used   Substance Use Topics   • Alcohol use: No   • Drug use: No     Social History     Substance and Sexual Activity   Drug Use No       PAST SURGICAL HISTORY:  Past Surgical History:   Procedure Laterality Date   • AICD IMPLANT  07/29/2021    [employer] D233 implanted by Dr. Isaac.   • SPLIT THICKNESS SKIN GRAFT N/A 8/23/2020    Procedure: APPLICATION, GRAFT, SKIN, SPLIT-THICKNESS;  Surgeon: Elisa Craig M.D.;  Location: [facility];  Service: Plastics   • SKIN ABSCESS INCISION AND DRAINAGE Right 8/3/2020    Procedure: INCISION AND DRAINAGE - SCROTAL WOUND - WOUND VAC PLACEMENT;  Surgeon: Jaylen Hayes M.D.;  Location: [facility];  Service: Urology   • PB EXPLORE SCROTUM Right 7/31/2020    Procedure: EXPLORATION, SCROTUM - FOR WASH OUT OF SCROTAL WOUND & WOUND VAC PLACEMENT;  Surgeon: Ferny Rosales M.D.;  Location: [facility];  Service:  "Urology   • PB EXPLORE SCROTUM Right 7/29/2020    Procedure: EXPLORATION, SCROTUM - FOR I & D OF SCROTAL AND  GROIN WOUND;  Surgeon: Donta Viera M.D.;  Location: SURGERY [facility];  Service: Urology   • PB INCIS/DRAIN SCROTUM/TESTIS,EPIDIDYM Right 7/25/2020    Procedure: INCISION AND DRAINAGE, SCROTUM;  Surgeon: Nick Negro M.D.;  Location: SURGERY [facility];  Service: Urology   • TEMPORAL ARTERY BIOPSY Right 6/26/2018    Procedure: TEMPORAL ARTERY BIOPSY;  Surgeon: Rajendra Aguilar M.D.;  Location: SURGERY SAME DAY [facility];  Service: General   • CAROTID ENDARTERECTOMY Right 3/21/2018    Procedure: CAROTID ENDARTERECTOMY- W/EEG MONITORING;  Surgeon: Benny Morfin M.D.;  Location: SURGERY [facility];  Service: General   • APPENDECTOMY     • CATH PLACEMENT      right arm   • LAMINOTOMY      diskectomy; C4   • OTHER CARDIAC SURGERY      stents x 3       ALLERGIES:   Allergies   Allergen Reactions   • Doxycycline      Swelling lips and difficulties swallowing   • Atorvastatin    • Beta Adrenergic Blockers Unspecified     dizziness   • Metformin Unspecified and Palpitations     Cardiac effects  \"Similar to a heart attack, I was hospitalized\"   • Simvastatin      Other reaction(s): Other (Specify with Comments)  Myalgias  Myalgias   • Statins [Hmg-Coa-R Inhibitors]      Muscle ache, joint pain       MEDICATIONS:  No current facility-administered medications on file prior to encounter.     Current Outpatient Medications on File Prior to Encounter   Medication Sig Dispense Refill   • finasteride (PROSCAR) 5 MG Tab TAKE 1 TABLET BY MOUTH EVERY DAY 90 Tablet 1   • apixaban (ELIQUIS) 5mg Tab Take 1 Tablet by mouth 2 times a day. 180 Tablet 3   • dicyclomine (BENTYL) 10 MG Cap Take 10 mg by mouth every day.     • nitroglycerin (NITROSTAT) 0.4 MG SL Tab Place 1 Tablet under the tongue as needed for Chest Pain (or hypertension >180/110). 25 Tablet 3   • bumetanide (BUMEX) 0.5 MG Tab " Take 1 tablet by mouth every day. 30 tablet 4   • docusate sodium (COLACE) 100 MG Cap Take 1 capsule by mouth 2 times a day as needed for Constipation. 90 capsule 1   • digoxin (LANOXIN) 125 MCG Tab Take 1 tablet by mouth every day at 6 PM. 90 tablet 3   • metoprolol SR (TOPROL XL) 25 MG TABLET SR 24 HR Take 1 tablet by mouth every evening. 90 tablet 3   • Blood Glucose Monitoring Suppl (ONETOUCH VERIO) w/Device Kit 1 Device 3 times a day before meals. 1 Kit 1   • Insulin Degludec (TRESIBA FLEXTOUCH) 200 UNIT/ML Solution Pen-injector Inject 12 Units under the skin every evening. (Patient taking differently: Inject 6 Units under the skin every morning.) 3 mL 11   • Blood Glucose Test Strips verio test strips for glucose monitoring TID and  Each 5   • hydroxyurea (HYDREA) 500 MG Cap Take 500 mg by mouth 2 Times a Day.     • aspirin EC (ECOTRIN) 81 MG Tablet Delayed Response Take 1 Tab by mouth every day. 30 Tab    • therapeutic multivitamin-minerals (THERAGRAN-M) Tab Take 1 Tab by mouth every day.         LABS:  Lab Results   Component Value Date/Time    HEMOGLOBIN 14.7 10/28/2021 0157    HEMATOCRIT 43.5 10/28/2021 0157    WBC 4.7 (L) 10/28/2021 0157     Lab Results   Component Value Date/Time    SODIUM 135 10/29/2021 1707    POTASSIUM 3.7 10/29/2021 1707    CHLORIDE 102 10/29/2021 1707    CO2 19 (L) 10/29/2021 1707    GLUCOSE 224 (H) 10/29/2021 1707    BUN 13 10/29/2021 1707    CALCIUM 8.6 10/29/2021 1707       SARS-CoV2 result: Negative      PREVIOUS ANESTHETICS: See EMR  __________________________________________      Relevant Problems   PULMONARY   (positive) Shortness of breath      NEURO   (positive) Chronic daily headache   (positive) Chronic headache   (positive) History of ST elevation myocardial infarction (STEMI)   (positive) History of TIA (transient ischemic attack)   (positive) History of stroke- old right parietal/occipital      CARDIAC   (positive) CAD (coronary artery disease)   (positive)  Carotid stenosis, 90% right carotid   (positive) Essential hypertension   (positive) Paroxysmal A-fib (HCC)   (positive) STEMI (ST elevation myocardial infarction) ([facility])   (positive) Stenosis of right carotid artery-Right CEA 3/21/18         (positive) Nephrolithiasis      ENDO   (positive) Type 2 diabetes mellitus without complication, with long-term current use of insulin (HCC)      Other   (positive) Polycythemia vera (HCC)       Physical Exam    Airway   Mallampati: II  TM distance: >3 FB  Neck ROM: full       Cardiovascular - normal exam  Rhythm: regular  Rate: normal  (-) murmur     Dental - normal exam           Pulmonary - normal exam  Breath sounds clear to auscultation     Abdominal    Neurological - normal exam                 Anesthesia Plan    ASA 4- EMERGENT   ASA physical status 4 criteria: severe reduction of ejection fractionsASA physical status emergent criteria: acute deteriorating condition due to infection    Plan - MAC               Induction: intravenous    Postoperative Plan: Postoperative administration of opioids is intended.    Pertinent diagnostic labs and testing reviewed    Informed Consent:    Anesthetic plan and risks discussed with patient.    Use of blood products discussed with: patient whom consented to blood products.

## 2021-10-30 NOTE — CARE PLAN
The patient is Watcher - Medium risk of patient condition declining or worsening    Shift Goals  Clinical Goals: Chest pain will resolve  Patient Goals: Rest   Family Goals: Call with changes     Progress made toward(s) clinical / shift goals:  Patient no longer reporting chest pain. Patient now reports mild pain in throat. Resolved with pain medication. Repeat EKG performed. Family would like updates. Patient NPO for PAMELA in the morning.     Patient is not progressing towards the following goals:N/A      Problem: Knowledge Deficit - Standard  Goal: Patient and family/care givers will demonstrate understanding of plan of care, disease process/condition, diagnostic tests and medications  Outcome: Progressing     Problem: Pain - Standard  Goal: Alleviation of pain or a reduction in pain to the patient’s comfort goal  Outcome: Progressing     Problem: Fall Risk  Goal: Patient will remain free from falls  Outcome: Progressing

## 2021-10-30 NOTE — ANESTHESIA POSTPROCEDURE EVALUATION
Patient: Francesco Schulte    Procedure Summary     Date: 10/30/21 Room / Location:  OR  / SURGERY [facility]    Anesthesia Start: 0847 Anesthesia Stop: 0933    Procedure: ECHOCARDIOGRAM, TRANSESOPHAGEAL (N/A ) Diagnosis: (R/o Bacterial Endocarditis)    Surgeons: Beau Gutierrez M.D. Responsible Provider: Alessandro Joseph M.D.    Anesthesia Type: MAC ASA Status: 4 - Emergent          Final Anesthesia Type: MAC  Last vitals  BP   Blood Pressure : 127/69    Temp   36.6 °C (97.9 °F)    Pulse   60   Resp   13    SpO2   99 %      Anesthesia Post Evaluation    Patient location during evaluation: PACU  Patient participation: complete - patient participated  Level of consciousness: sleepy but conscious  Pain score: 0    Airway patency: patent  Anesthetic complications: no  Cardiovascular status: hemodynamically stable  Respiratory status: acceptable and face mask  Hydration status: euvolemic    PONV: none          No complications documented.     Nurse Pain Score: 0 (NPRS)

## 2021-10-30 NOTE — OR NURSING
0757:  Brought pt to PACU for pre-op surgical procedures. Pt awake and alert, no pain, no nausea, on room air.  0837: Patient allergies and NPO status verified, home medication reconciliation completed and belongings secured. Patient verbalizes understanding of pain scale, expected course of stay and plan of care. Surgical site verified with patient. IV patency verified.

## 2021-10-30 NOTE — PROGRESS NOTES
Received report from night shift RN. Patient A&Ox4, reporting pain, declines pain medication at this time. Denies N/V, SOB at this time. Patient reports improvement in chest pain. Safety precautions in place.     Patient off floor to preop.    COVID-19 surge in effect.

## 2021-10-31 ENCOUNTER — APPOINTMENT (OUTPATIENT)
Dept: RADIOLOGY | Facility: MEDICAL CENTER | Age: 71
DRG: 872 | End: 2021-10-31
Attending: STUDENT IN AN ORGANIZED HEALTH CARE EDUCATION/TRAINING PROGRAM
Payer: MEDICARE

## 2021-10-31 LAB
ANION GAP SERPL CALC-SCNC: 14 MMOL/L (ref 7–16)
BUN SERPL-MCNC: 10 MG/DL (ref 8–22)
CALCIUM SERPL-MCNC: 8.4 MG/DL (ref 8.4–10.2)
CHLORIDE SERPL-SCNC: 105 MMOL/L (ref 96–112)
CO2 SERPL-SCNC: 19 MMOL/L (ref 20–33)
CREAT SERPL-MCNC: 0.69 MG/DL (ref 0.5–1.4)
EKG IMPRESSION: NORMAL
ERYTHROCYTE [DISTWIDTH] IN BLOOD BY AUTOMATED COUNT: 63.6 FL (ref 35.9–50)
GLUCOSE BLD-MCNC: 152 MG/DL (ref 65–99)
GLUCOSE BLD-MCNC: 219 MG/DL (ref 65–99)
GLUCOSE BLD-MCNC: 255 MG/DL (ref 65–99)
GLUCOSE SERPL-MCNC: 227 MG/DL (ref 65–99)
HCT VFR BLD AUTO: 48.3 % (ref 42–52)
HGB BLD-MCNC: 15.9 G/DL (ref 14–18)
MCH RBC QN AUTO: 35.8 PG (ref 27–33)
MCHC RBC AUTO-ENTMCNC: 32.9 G/DL (ref 33.7–35.3)
MCV RBC AUTO: 108.8 FL (ref 81.4–97.8)
PLATELET # BLD AUTO: 217 K/UL (ref 164–446)
PMV BLD AUTO: 11.1 FL (ref 9–12.9)
POTASSIUM SERPL-SCNC: 3.6 MMOL/L (ref 3.6–5.5)
RBC # BLD AUTO: 4.44 M/UL (ref 4.7–6.1)
SODIUM SERPL-SCNC: 138 MMOL/L (ref 135–145)
TROPONIN T SERPL-MCNC: 17 NG/L (ref 6–19)
TROPONIN T SERPL-MCNC: 18 NG/L (ref 6–19)
WBC # BLD AUTO: 8.2 K/UL (ref 4.8–10.8)

## 2021-10-31 PROCEDURE — A9270 NON-COVERED ITEM OR SERVICE: HCPCS | Performed by: INTERNAL MEDICINE

## 2021-10-31 PROCEDURE — 85027 COMPLETE CBC AUTOMATED: CPT

## 2021-10-31 PROCEDURE — A9270 NON-COVERED ITEM OR SERVICE: HCPCS | Performed by: STUDENT IN AN ORGANIZED HEALTH CARE EDUCATION/TRAINING PROGRAM

## 2021-10-31 PROCEDURE — 700102 HCHG RX REV CODE 250 W/ 637 OVERRIDE(OP): Performed by: STUDENT IN AN ORGANIZED HEALTH CARE EDUCATION/TRAINING PROGRAM

## 2021-10-31 PROCEDURE — 93010 ELECTROCARDIOGRAM REPORT: CPT | Performed by: INTERNAL MEDICINE

## 2021-10-31 PROCEDURE — 84484 ASSAY OF TROPONIN QUANT: CPT

## 2021-10-31 PROCEDURE — 770001 HCHG ROOM/CARE - MED/SURG/GYN PRIV*

## 2021-10-31 PROCEDURE — 700111 HCHG RX REV CODE 636 W/ 250 OVERRIDE (IP): Performed by: STUDENT IN AN ORGANIZED HEALTH CARE EDUCATION/TRAINING PROGRAM

## 2021-10-31 PROCEDURE — 99232 SBSQ HOSP IP/OBS MODERATE 35: CPT | Performed by: INTERNAL MEDICINE

## 2021-10-31 PROCEDURE — 700101 HCHG RX REV CODE 250: Performed by: STUDENT IN AN ORGANIZED HEALTH CARE EDUCATION/TRAINING PROGRAM

## 2021-10-31 PROCEDURE — 700102 HCHG RX REV CODE 250 W/ 637 OVERRIDE(OP): Performed by: INTERNAL MEDICINE

## 2021-10-31 PROCEDURE — 80048 BASIC METABOLIC PNL TOTAL CA: CPT

## 2021-10-31 PROCEDURE — 36415 COLL VENOUS BLD VENIPUNCTURE: CPT

## 2021-10-31 PROCEDURE — 700111 HCHG RX REV CODE 636 W/ 250 OVERRIDE (IP): Performed by: INTERNAL MEDICINE

## 2021-10-31 PROCEDURE — 71045 X-RAY EXAM CHEST 1 VIEW: CPT

## 2021-10-31 PROCEDURE — 82962 GLUCOSE BLOOD TEST: CPT

## 2021-10-31 PROCEDURE — 93005 ELECTROCARDIOGRAM TRACING: CPT | Performed by: STUDENT IN AN ORGANIZED HEALTH CARE EDUCATION/TRAINING PROGRAM

## 2021-10-31 PROCEDURE — 99232 SBSQ HOSP IP/OBS MODERATE 35: CPT | Performed by: STUDENT IN AN ORGANIZED HEALTH CARE EDUCATION/TRAINING PROGRAM

## 2021-10-31 RX ORDER — LIDOCAINE 50 MG/G
1 PATCH TOPICAL EVERY 24 HOURS
Status: DISCONTINUED | OUTPATIENT
Start: 2021-10-31 | End: 2021-11-02 | Stop reason: HOSPADM

## 2021-10-31 RX ORDER — FUROSEMIDE 10 MG/ML
40 INJECTION INTRAMUSCULAR; INTRAVENOUS ONCE
Status: COMPLETED | OUTPATIENT
Start: 2021-10-31 | End: 2021-10-31

## 2021-10-31 RX ADMIN — DIGOXIN 125 MCG: 125 TABLET ORAL at 17:20

## 2021-10-31 RX ADMIN — INSULIN HUMAN 5 UNITS: 100 INJECTION, SOLUTION PARENTERAL at 13:09

## 2021-10-31 RX ADMIN — CEFAZOLIN 2 G: 1 INJECTION, POWDER, FOR SOLUTION INTRAVENOUS at 05:09

## 2021-10-31 RX ADMIN — SENNOSIDES AND DOCUSATE SODIUM 2 TABLET: 8.6; 5 TABLET ORAL at 17:20

## 2021-10-31 RX ADMIN — INSULIN HUMAN 3 UNITS: 100 INJECTION, SOLUTION PARENTERAL at 17:26

## 2021-10-31 RX ADMIN — OXYCODONE 5 MG: 5 TABLET ORAL at 15:10

## 2021-10-31 RX ADMIN — Medication 5 MG: at 22:00

## 2021-10-31 RX ADMIN — LORAZEPAM 0.5 MG: 2 INJECTION INTRAMUSCULAR; INTRAVENOUS at 15:47

## 2021-10-31 RX ADMIN — APIXABAN 5 MG: 5 TABLET, FILM COATED ORAL at 05:09

## 2021-10-31 RX ADMIN — OXYCODONE 5 MG: 5 TABLET ORAL at 22:12

## 2021-10-31 RX ADMIN — FUROSEMIDE 40 MG: 10 INJECTION, SOLUTION INTRAMUSCULAR; INTRAVENOUS at 15:08

## 2021-10-31 RX ADMIN — LIDOCAINE HYDROCHLORIDE 15 ML: 20 SOLUTION OROPHARYNGEAL at 13:58

## 2021-10-31 RX ADMIN — OXYCODONE 5 MG: 5 TABLET ORAL at 02:55

## 2021-10-31 RX ADMIN — LIDOCAINE 1 PATCH: 50 PATCH TOPICAL at 15:08

## 2021-10-31 RX ADMIN — CEFAZOLIN 2 G: 1 INJECTION, POWDER, FOR SOLUTION INTRAVENOUS at 22:00

## 2021-10-31 RX ADMIN — GUAIFENESIN 200 MG: 100 SOLUTION ORAL at 22:28

## 2021-10-31 RX ADMIN — APIXABAN 5 MG: 5 TABLET, FILM COATED ORAL at 17:19

## 2021-10-31 RX ADMIN — TAMSULOSIN HYDROCHLORIDE 0.4 MG: 0.4 CAPSULE ORAL at 08:47

## 2021-10-31 RX ADMIN — METOPROLOL SUCCINATE 25 MG: 25 TABLET, EXTENDED RELEASE ORAL at 05:10

## 2021-10-31 RX ADMIN — ASPIRIN 81 MG: 81 TABLET, COATED ORAL at 05:08

## 2021-10-31 RX ADMIN — INSULIN GLARGINE 6 UNITS: 100 INJECTION, SOLUTION SUBCUTANEOUS at 05:39

## 2021-10-31 RX ADMIN — CEFAZOLIN 2 G: 1 INJECTION, POWDER, FOR SOLUTION INTRAVENOUS at 15:08

## 2021-10-31 RX ADMIN — INSULIN HUMAN 3 UNITS: 100 INJECTION, SOLUTION PARENTERAL at 05:37

## 2021-10-31 RX ADMIN — OXYCODONE 5 MG: 5 TABLET ORAL at 08:47

## 2021-10-31 RX ADMIN — FINASTERIDE 5 MG: 5 TABLET, FILM COATED ORAL at 05:08

## 2021-10-31 ASSESSMENT — ENCOUNTER SYMPTOMS
VOMITING: 0
SHORTNESS OF BREATH: 0
FOCAL WEAKNESS: 1
BACK PAIN: 1
NAUSEA: 0
COUGH: 0
FEVER: 0

## 2021-10-31 ASSESSMENT — PAIN DESCRIPTION - PAIN TYPE
TYPE: ACUTE PAIN

## 2021-10-31 NOTE — PROGRESS NOTES
"Hospital Medicine Daily Progress Note    Date of Service  10/31/2021    Chief Complaint  Francesco Schulte is a 71 y.o. male admitted 10/24/2021 with dizziness    Hospital Course  A 71-year-old man with ho DM, HLD, HFrEF, s/p AICD, Afib (AC with apixaban, BPH, CVA presented with dizziness, lower extremity weakness for 2 days.   CTA showed rigth proximal P2 occlusion. Neurology recommended MRI brain. Blood cultures positive for MSSA. ID recommended continue cefazolin, anticipate 6 weeks of antibiotics.     10/26: Patient reports dizziness on standing, nausea, no strength on b/l legs. Afebrile, hemodynamically stable. On room air. Patient had some pancreatic mass, and supposed to get MRI of pancreas in 2 weeks. MRCP ordered. No stool x2 days. Blood cultures from 10/24 positive for S.aureus     10/27: Patient had chest pain at night, pleuritic. Troponin negative. ECG showed sinus rhythm, incomplete LLB, LVH. Afebrile, hemodynamically stable. Na 133. Tired. No chest pain in the morning. Now able to move legs.     10/28: PT, OT recommended HH. Afebrile, hemodynamically stable.  MRI spine ordered to r/o spinal infection. ID following, recommendations appreciated.     10/29: Patient reports shoulders, arms, back pain, cant hardly move, \"lot of pain\". Afebrile, hemodynamically stable. MRI on Monday. PAMELA tomorrow, NPO midnight. Ordered TTE     10/30: Patient had chest pain last evening, pleuritic in character, troponin x2 negative. No ECG changes. Afebrile, hemodynamically stable. SARS-COV antigen positive. Ordered COVID 19 PCR test.  This morning patient reports b/l shoulder, b/l arm, theeth tightness and pain, chest pain, cant feel right arm.  Patient underwent PAMELA and TTE today.    Interval Problem Update  10/31: Coughing, reports b/l shoulder pain. Afebrile, hemodynamically stable. Echo showed echodense partially mobile mass seen in the right atrium, difficult to assess if this is attached to the ICD wire, no " evidence of tricuspid valve endocarditis, small pericardial effusion without evidence of hemodynamic compromise.  Developed urinary obstruction, Andres was placed. Started tamsulosin. Blood cultures from 10/28 and 10/30 negative.  ID recommending explantation of AICD.  Discussed with cardiology, Dr. Gutierrez, who agrees with removal of AICD. Dr. Gutierrez recommended to wait for couple more days, if last two blood cultures remains negative, then we will transfer the patient to the Main hospital for AICD removal.    Around 1:50 pm RRT was called due to chest pain. Patient was walking in a hallway and reported chest pain, left-sided. Similar pain he had 2 days ago. Pain is aggravated with deep breath, cough, and palpation. Vitals stable. Likely pleuritic vs. musculoskeletal in origin. Ordered repeat ecg, troponin, CXR, lidocaine patch. No new changes on ecg.  CXR showed vascular congestion, edema. Ordered 40 mg IV lasix.      I have personally seen and examined the patient at bedside. I discussed the plan of care with patient, bedside RN, charge RN,  and pharmacy.    Consultants/Specialty  cardiology and infectious disease    Code Status  Full Code    Disposition  Patient is not medically cleared.   Anticipate discharge to to home with close outpatient follow-up.  I have placed the appropriate orders for post-discharge needs.    Review of Systems  Constitutional: Positive for malaise/fatigue. Negative for chills and fever.   HENT: Negative.    Eyes: Negative.    Respiratory: Negative for cough, sputum production and shortness of breath.    Cardiovascular: Positive for chest pain. Negative for leg swelling.   Gastrointestinal: Positive for abdominal pain. Negative for nausea and vomiting.   Genitourinary: Negative for dysuria.   Musculoskeletal: Positive for joint pain.   Skin: Negative.    Neurological: Positive for weakness.     Physical Exam  Temp:  [36.6 °C (97.9 °F)-36.7 °C (98.1 °F)] 36.7 °C (98 °F)  Pulse:   [68-79] 79  Resp:  [16-18] 18  BP: (110-152)/(65-87) 110/87  SpO2:  [92 %-98 %] 94 %    Physical Exam  Vitals and nursing note reviewed.   Constitutional:       Appearance: He is ill-appearing.   HENT:      Head: Normocephalic.      Mouth/Throat:      Mouth: Mucous membranes are moist.      Pharynx: Oropharynx is clear.   Eyes:      Pupils: Pupils are equal, round, and reactive to light.   Cardiovascular:      Rate and Rhythm: Normal rate and regular rhythm.   Pulmonary:      Effort: Pulmonary effort is normal. No respiratory distress.      Breath sounds: No wheezing or rales.   Abdominal:      General: Abdomen is flat. Bowel sounds are normal. There is no distension.   Musculoskeletal:         General: Normal range of motion.      Cervical back: Normal range of motion.      Comments: Reproducible left chest tenderness.    Fluids    Intake/Output Summary (Last 24 hours) at 10/31/2021 1243  Last data filed at 10/31/2021 0745  Gross per 24 hour   Intake 1236 ml   Output 2600 ml   Net -1364 ml       Laboratory  Recent Labs     10/31/21  0331   WBC 8.2   RBC 4.44*   HEMOGLOBIN 15.9   HEMATOCRIT 48.3   .8*   MCH 35.8*   MCHC 32.9*   RDW 63.6*   PLATELETCT 217   MPV 11.1     Recent Labs     10/29/21  1707 10/31/21  0331   SODIUM 135 138   POTASSIUM 3.7 3.6   CHLORIDE 102 105   CO2 19* 19*   GLUCOSE 224* 227*   BUN 13 10   CREATININE 0.96 0.69   CALCIUM 8.6 8.4                   Imaging  EC-ECHOCARDIOGRAM LTD W/O CONT   Final Result      EC-PAMELA W/O CONT         DX-CHEST-PORTABLE (1 VIEW)   Final Result      Enlarged cardiac silhouette and interstitial prominence of pleural fluid suggesting congestive heart failure.      MR-BRAIN-W/O   Final Result      1.  Mild cerebral atrophy.   2.  Chronic encephalomalacic changes right lateral occipital lobe, right parietal lobe, right posterior frontal lobe most consistent with old cerebral infarcts.   3.  Small area of chronic encephalomalacic change at the right anterior  parasagittal frontal lobe which has developed since the prior exam. This may represent an old infarction versus chronic sequela of posttraumatic brain contusion.   4.  Small area of chronic encephalomalacic change at the inferior surface of the left temporal lobe which was not present on the prior exam from 2018. Lesion location and morphology is most suggestive of chronic posttraumatic brain contusion.   5.  No evidence of acute infarction, acute hemorrhage, or mass lesion.      [id number]/O   Final Result      1.  There is cholelithiasis without pericholecystic, gallbladder wall thickening or biliary dilatation.   2.  There is no choledocholithiasis.   3.  There is a potential 2 cm cystic lesion in the pancreatic tail region/splenic hilum.      CT-CTA NECK WITH & W/O-POST PROCESSING   Final Result         1.  No high-grade stenosis, occlusion, or aneurysm appreciated.         CT-CTA HEAD WITH & W/O-POST PROCESS   Final Result         1.  Right proximal P2 occlusion, reconstitutes distally   2.  Changes of atrophy with nonspecific white matter changes, most commonly associated with small vessel ischemia.      These findings were discussed with the patient's clinician, Dr. Farooq, on 10/24/2021 10:12 PM.      DX-CHEST-PORTABLE (1 VIEW)   Final Result      No acute cardiac or pulmonary abnormality is noted.      MR-CERVICAL SPINE-WITH & W/O    (Results Pending)   MR-THORACIC SPINE-WITH & W/O    (Results Pending)   MR-LUMBAR SPINE-WITH & W/O    (Results Pending)        Assessment/Plan  * Bacteremia  Assessment & Plan  MSSA  Patient with incidental blood cultures positive for gram positive cocci concerning for staph species  UA negative and CXR nonrevealing  Echo showed echodense partially mobile mass seen in the right atrium, difficult to assess if this is attached to the ICD wire, no evidence of tricuspid valve endocarditis, small pericardial effusion without evidence of hemodynamic compromise.  ID following,  recommendations appreciated: explantation of AICD  Discussed with cardiology, Dr. Gutierrez, who agrees with removal of AICD. Dr. Gutierrez recommended to wait for couple more days, if last two blood cultures remains negative, then we will transfer the patient to the Main hospital for AICD removal.  Continue cefazolin    Ischemic cardiomyopathy- (present on admission)  Assessment & Plan  -Per cardiology notes. Status post AICD 09/2021  -Last echo 05/2021 with a EF of 15-20%  -Continue metoprolol and digoxin. Not on ACEI/ARB due to intolerance per cardiology notes  -Patient is to be on spironolactone per cardiology note 09/2021 however says is not taking it  -Med rec fluids bumetanide, patient states he is not on any diuretic  - no active volume overload at this time     Vertigo- (present on admission)  Assessment & Plan  -pt states he has a hx of vertigo which had resolved, complaining of vertigo now, dizziness like room spinning with rapid movement of head to one side  -pt with hx of CVA and CAD. Bilateral nystagmus on exam concerning for central cause   -Annette-Hallpike Maneuver for further eval not done since pt too nauseous   -meclizine and zofran for symptom control  -CT head and neck shows right PCA occlusion however unclear if acute or chronic as patient has had previous posterior circulation stroke  MRI brain showed chronic multiple infarctions  Neurology recommended to resume anticoagulation for stroke prevention    Orthostatic hypotension- (present on admission)  Assessment & Plan  -Reported history of orthostatic hypotension per notes, currently complaining of dizziness upon change of position consistent with orthostatic hypotension  -check orthostatic vitals   -Would be difficult to keep patient hydrated due to history of HFrEF    Essential hypertension- (present on admission)  Assessment & Plan  -Continue metoprolol    History of stroke- old right parietal/occipital- (present on admission)  Assessment &  Plan  Continue aspirin. Patient not on statin due to history of statin intolerance    CAD (coronary artery disease)- (present on admission)  Assessment & Plan  Continue aspirin. Patient not on statin due to history of statin intolerance  Goal directed medical therapy as tolerated  No active chest pain at this time     Type 2 diabetes mellitus without complication, with long-term current use of insulin (HCC)- (present on admission)  Assessment & Plan  -Continue long-acting insulin  -Insulin sliding scale and hypoglycemia protocol while in hospital    Paroxysmal A-fib (HCC)- (present on admission)  Assessment & Plan  -Rate controlled, not in RVR  -Continue metoprolol, digoxin   -resumed apixaban per neurology on 10/27    Benign prostatic hyperplasia without lower urinary tract symptoms- (present on admission)  Assessment & Plan  Continue finasteride  On 10/31 patient developed urinary obstruction  Andres placed on 10/31.  Started on tamsulosin       VTE prophylaxis: therapeutic anticoagulation with apixaban    I have performed a physical exam and reviewed and updated ROS and Plan today (10/31/2021). In review of yesterday's note (10/30/2021), there are no changes except as documented above.

## 2021-10-31 NOTE — PROGRESS NOTES
"Patient c/o chest pain, Rapid Response called. Protocol followed.    /76   Pulse 73   Temp 36.6 °C (97.9 °F)   Resp 20   Ht 1.905 m (6' 3\")   Wt 86.5 kg (190 lb 11.2 oz)   SpO2 97%   BMI 23.84 kg/m²      Patient c/o heartburn, medication effective.    Will continue to monitor closely.    "

## 2021-10-31 NOTE — PROGRESS NOTES
Bedside report received from Isa JORGENSEN and assumed Pt's cares. Call light within reach. Safety measures in place.

## 2021-10-31 NOTE — PROGRESS NOTES
Change in patient condition due to: Cardiac  Rapid Response called at: 1345  Physician Dr. Peres notified at 1345  MD at bedside and ordered Troponin, CXR, and EKG. EKG unchanged. Blood drawn and CXR obtained. Patient to remain on the unit at this time.     See Code Blue timeline for rapid response events. Patient Remained on Unit

## 2021-10-31 NOTE — PROGRESS NOTES
At 20:50 Pt complained of severe lower abd discomfort, bladder noticed to be distended on palpation, bladder scan performed showing 964 ml. Strait cath performed as per orders and using aseptic technique. Pt tolerated well. 800 ml of yellow, clear urine drained.

## 2021-10-31 NOTE — PROGRESS NOTES
About 04:50 Bladder scan showed >900 ml. Dr David notified of frequent need for straight catheterization. Order received to place Andres cath. Andres cath placed using aseptic technique and following protocol. Pt tolerated well.

## 2021-10-31 NOTE — OP REPORT
PAMELA completed without immediate post-procedure complications.  Full PAMELA report to follow.    Beau Gutierrez M.D.

## 2021-10-31 NOTE — CARE PLAN
The patient is Stable - Low risk of patient condition declining or worsening    Shift Goals  Clinical Goals: Decreased chest pain  Patient Goals: Rest   Family Goals: Call with changes     Progress made toward(s) clinical / shift goals:  Patient reporting improvement in chest pain this shift.     Problem: Knowledge Deficit - Standard  Goal: Patient and family/care givers will demonstrate understanding of plan of care, disease process/condition, diagnostic tests and medications  Outcome: Progressing     Problem: Pain - Standard  Goal: Alleviation of pain or a reduction in pain to the patient’s comfort goal  Outcome: Progressing     Problem: Fall Risk  Goal: Patient will remain free from falls  Outcome: Progressing

## 2021-10-31 NOTE — CARE PLAN
The patient is Watcher - Medium risk of patient condition declining or worsening    Shift Goals  Clinical Goals: Srait cath as needed. administer Abx as prescribed  Patient Goals: Strait cath as needed. sleep  Family Goals: Call with changes     Progress made toward(s) clinical / shift goals:    Patient is not progressing towards the following goals:      Problem: Knowledge Deficit - Standard  Goal: Patient and family/care givers will demonstrate understanding of plan of care, disease process/condition, diagnostic tests and medications  Outcome: Progressing     Problem: Pain - Standard  Goal: Alleviation of pain or a reduction in pain to the patient’s comfort goal  Outcome: Progressing     Problem: Fall Risk  Goal: Patient will remain free from falls  Outcome: Progressing

## 2021-10-31 NOTE — PROGRESS NOTES
"Infectious Disease Progress Note    Author: Pema Asif M.D. Date & Time of service: 10/31/2021  11:49 AM    Chief Complaint:  MSSA sepsis    Interval History:  71 y.o. diabetic male admitted 10/24/2021 for weakness, dizziness, palpitations.+ AICD, Afib on apixaban, BPH, CVA found to have new CVA and above  10/26 AF WBC more alert today-still has LE weakness and can't walk  10/27 AF WBC 4.9 repeat blood cxs +staph No new complaints ECHO not done yet  10/28 AF sleeping soundly at time of rounds No echo yet  10/29 AF WBC 4.7 c/o back pain today-not controlled with meds  10/30 AF WBC not done plan for Echo today. Episode chest pain note, pleuritic. EKG no ischemia  10/31 AF WBC  8.2 TTE with thrombus RA-PAMELA pending Urinary retention noted-new Andres  Attributes retention to \"they haven't been giving me my pee pills\"    Labs Reviewed, Medications Reviewed and Radiology Reviewed.    Review of Systems:  Review of Systems   Constitutional: Negative for fever.   Respiratory: Negative for cough and shortness of breath.    Gastrointestinal: Negative for nausea and vomiting.   Genitourinary: Positive for hematuria.        Urinary retention   Musculoskeletal: Positive for back pain.   Skin: Negative for rash.   Neurological: Positive for focal weakness.   All other systems reviewed and are negative.      Hemodynamics:  Temp (24hrs), Av.6 °C (97.9 °F), Min:36.3 °C (97.4 °F), Max:36.7 °C (98.1 °F)  Temperature: 36.6 °C (97.9 °F)  Pulse  Av.8  Min: 59  Max: 103   Blood Pressure : 117/65       Physical Exam:  Physical Exam  Vitals and nursing note reviewed.   Constitutional:       General: He is not in acute distress.     Appearance: He is not ill-appearing, toxic-appearing or diaphoretic.   HENT:      Nose: No rhinorrhea.      Mouth/Throat:      Pharynx: No oropharyngeal exudate.   Eyes:      General: No scleral icterus.     Extraocular Movements: Extraocular movements intact.      Pupils: Pupils are equal, round, " and reactive to light.   Cardiovascular:      Rate and Rhythm: Normal rate. Rhythm irregular.      Heart sounds: Murmur heard.        Comments: AICD nontender  Pulmonary:      Effort: Pulmonary effort is normal. No respiratory distress.      Breath sounds: No stridor.   Abdominal:      General: There is no distension.      Palpations: Abdomen is soft.      Tenderness: There is no abdominal tenderness. There is no guarding.   Genitourinary:     Comments: Andres -blood-tinged urine  Musculoskeletal:      Cervical back: Neck supple. No tenderness.      Right lower leg: No edema.      Left lower leg: No edema.   Skin:     Coloration: Skin is not jaundiced.      Findings: Bruising present.   Neurological:      General: No focal deficit present.      Mental Status: He is alert and oriented to person, place, and time.      Sensory: No sensory deficit.         Meds:    Current Facility-Administered Medications:   •  hyoscyamine-lidocaine-Maalox (GI Cocktail)  •  aspirin EC  •  tamsulosin  •  Pharmacy Consult Request  •  [DISCONTINUED] oxyCODONE immediate-release **OR** oxyCODONE immediate-release **OR** HYDROmorphone  •  guaiFENesin  •  acetaminophen  •  ceFAZolin  •  NS  •  senna-docusate **AND** polyethylene glycol/lytes **AND** magnesium hydroxide **AND** bisacodyl  •  enalaprilat  •  labetalol  •  insulin regular **AND** POC blood glucose manual result **AND** NOTIFY MD and PharmD **AND** glucose **AND** dextrose 50%  •  apixaban  •  digoxin  •  finasteride  •  metoprolol SR  •  insulin glargine  •  melatonin  •  ondansetron  •  meclizine  •  LORazepam    Labs:  Recent Labs     10/31/21  0331   WBC 8.2   RBC 4.44*   HEMOGLOBIN 15.9   HEMATOCRIT 48.3   .8*   MCH 35.8*   RDW 63.6*   PLATELETCT 217   MPV 11.1     Recent Labs     10/29/21  1707 10/31/21  0331   SODIUM 135 138   POTASSIUM 3.7 3.6   CHLORIDE 102 105   CO2 19* 19*   GLUCOSE 224* 227*   BUN 13 10     Recent Labs     10/29/21  1707 10/31/21  0331   ALBUMIN  2.8*  --    TBILIRUBIN 0.6  --    ALKPHOSPHAT 111*  --    TOTPROTEIN 7.3  --    ALTSGPT 10  --    ASTSGOT 16  --    CREATININE 0.96 0.69       Imaging:  CT-CTA HEAD WITH & W/O-POST PROCESS    Result Date: 10/25/2021  10/24/2021 9:03 PM HISTORY/REASON FOR EXAM:  Dizziness, dehydration or hypotension; vertigo new onset, vasculopath TECHNIQUE/EXAM DESCRIPTION: CT angiogram of the [place] without and with contrast.  Initial precontrast images were obtained of the head from the skull base through the vertex.  Postcontrast images were obtained of the [place] following the power injection of nonionic contrast at 5.0 mL/sec. Thin-section helical images were obtained with overlapping reconstruction interval. Coronal and sagittal multiplanar volume reformats were generated.  3D angiographic images were reviewed on PACS.  Maximum intensity projection (MIP) images were generated and reviewed. 80 mL of Omnipaque 350 nonionic contrast was injected intravenously. Low dose optimization technique was utilized for this CT exam including automated exposure control and adjustment of the mA and/or kV according to patient size. COMPARISON:  August 9, 2018. FINDINGS: The posterior circulation shows the distal vertebral arteries to be patent. Hypoplastic distal left vertebral artery is seen. Atherosclerotic changes in the distal right vertebral artery are seen, resulting in less than 50% stenosis The vertebrobasilar confluence is intact. The basilar artery is patent. Right proximal P2 occlusion is seen which reconstitutes distally. Persistent fetal morphology of the left posterior cerebral artery is seen. Atherosclerotic changes are seen of the bilateral intracranial internal carotid arteries resulting in less than 50% stenosis, otherwise the anterior circulation shows no stenotic or occlusive lesion. No aneurysm is evident about the [place]. Mild diffuse parenchymal volume loss is seen. There is mild  periventricular and subcortical white matter low-attenuation changes. Low-density changes in the right parietal occipital lobe are seen compatible with encephalomalacia. 3D angiographic/MIP images of the vasculature confirm the vascular findings as described above.     1.  Right proximal P2 occlusion, reconstitutes distally 2.  Changes of atrophy with nonspecific white matter changes, most commonly associated with small vessel ischemia. These findings were discussed with the patient's clinician, Dr. Farooq, on 10/24/2021 10:12 PM.    CT-CTA NECK WITH & W/O-POST PROCESSING    Result Date: 10/24/2021    10/24/2021 9:03 PM HISTORY/REASON FOR EXAM:  Vertigo new onset, vasculopath TECHNIQUE/EXAM DESCRIPTION:  CT angiogram of the neck with contrast. Postcontrast images were obtained of the neck from the great vessels through the skull base following the power injection of nonionic contrast at 5.0 mL/sec. Thin-section helical images were obtained with overlapping reconstruction interval. Coronal and oblique multiplanar volume reformats were generated. Cervical internal carotid artery percent stenosis is calculated using the standard method according to the NASCET criteria wherein a segment of uniform caliber mid or distal cervical internal carotid is used as the reference denominator. 3D angiographic images were reviewed on PACS.  Maximum intensity projection (MIP) images were generated and reviewed 80 mL of Omnipaque 350 nonionic contrast was injected intravenously. Low dose optimization technique was utilized for this CT exam including automated exposure control and adjustment of the mA and/or kV according to patient size. COMPARISON: August 9, 2018 FINDINGS: 1.4 cm peripherally calcified left thyroid cyst is seen. Aortic arch: bovine type branching pattern. There is atherosclerotic plaque of the aorta. Right common carotid artery: Patent Right internal carotid artery: Atherosclerotic plaque without significant stenosis  "(less than 50%). Left common carotid artery is patent. Left internal carotid artery: Atherosclerotic plaque without significant stenosis (less than 50%). The right vertebral artery is patent without dissection or stenosis. The left vertebral artery is patent without dissection or stenosis. Vertebrobasilar confluence: The vertebrobasilar confluence appears normal. Lung apices are clear The soft tissues of the neck are within normal limits. 3D angiographic/MIP images of the vasculature confirm the vascular findings as described above.     1.  No high-grade stenosis, occlusion, or aneurysm appreciated.     DX-CHEST-PORTABLE (1 VIEW)    Result Date: 10/24/2021  10/24/2021 5:30 PM HISTORY/REASON FOR EXAM:  Chest Pain TECHNIQUE/EXAM DESCRIPTION AND NUMBER OF VIEWS: Single portable view of the chest. COMPARISON:  7/29/2021, 5/28/2021 FINDINGS: Left bipolar transvenous pacing device remains in place. The cardiac silhouette is within normal limits. No infiltrates or consolidations are noted. No pleural effusion is noted.     No acute cardiac or pulmonary abnormality is noted.      Micro:  Results     Procedure Component Value Units Date/Time    BLOOD CULTURE [[id number]] Collected: 10/30/21 0109    Order Status: Completed Specimen: Blood from Peripheral Updated: 10/31/21 0757     Significant Indicator NEG     Source BLD     Site PERIPHERAL     Culture Result No Growth  Note: Blood cultures are incubated for 5 days and  are monitored continuously.Positive blood cultures  are called to the RN and reported as soon as  they are identified.  Blood culture testing and Gram stain, if indicated, are  performed at [facility], [address] [place] [address].  Positive blood cultures are  sent to [facility], [address] [place] [address], for organism identification and  susceptibility testing.      Narrative:      Per Hospital Policy: Only change Specimen Src: to \"Line\" if  specified " "by physician order.  No site indicated    BLOOD CULTURE [[id number]] Collected: 10/30/21 0109    Order Status: Completed Specimen: Blood from Peripheral Updated: 10/31/21 0757     Significant Indicator NEG     Source BLD     Site PERIPHERAL     Culture Result No Growth  Note: Blood cultures are incubated for 5 days and  are monitored continuously.Positive blood cultures  are called to the RN and reported as soon as  they are identified.  Blood culture testing and Gram stain, if indicated, are  performed at [facility], [address] [place] [address].  Positive blood cultures are  sent to [facility], [address] [place] [address], for organism identification and  susceptibility testing.      Narrative:      Per Hospital Policy: Only change Specimen Src: to \"Line\" if  specified by physician order.  No site indicated    BLOOD CULTURE [[id number]] Collected: 10/28/21 0157    Order Status: Completed Specimen: Blood from Peripheral Updated: 10/29/21 0744     Significant Indicator NEG     Source BLD     Site PERIPHERAL     Culture Result No Growth  Note: Blood cultures are incubated for 5 days and  are monitored continuously.Positive blood cultures  are called to the RN and reported as soon as  they are identified.  Blood culture testing and Gram stain, if indicated, are  performed at [facility], [address] [place] [address].  Positive blood cultures are  sent to [facility], [address] [place] [address], for organism identification and  susceptibility testing.      Narrative:      Per Hospital Policy: Only change Specimen Src: to \"Line\" if  specified by physician order.  Right hand    BLOOD CULTURE [[id number]] Collected: 10/28/21 0157    Order Status: Completed Specimen: Blood from Peripheral Updated: 10/29/21 0744     Significant Indicator NEG     Source BLD     Site PERIPHERAL     Culture Result No Growth  Note: " "Blood cultures are incubated for 5 days and  are monitored continuously.Positive blood cultures  are called to the RN and reported as soon as  they are identified.  Blood culture testing and Gram stain, if indicated, are  performed at [facility], [address] [place] [address].  Positive blood cultures are  sent to [facility], [address] [place] [address], for organism identification and  susceptibility testing.      Narrative:      Per Hospital Policy: Only change Specimen Src: to \"Line\" if  specified by physician order.  Left wriast    BLOOD CULTURE [[id number]]  (Abnormal) Collected: 10/26/21 0807    Order Status: Completed Specimen: Blood from Peripheral Updated: 10/28/21 1113     Significant Indicator POS     Source BLD     Site PERIPHERAL     Culture Result Growth detected by Bactec instrument. 10/27/2021  05:08      Staphylococcus aureus  See previous culture for sensitivity report.      Narrative:      CALL  Russell  [facility] tel. [phone],  CALLED  [facility] tel. [phone] 10/27/2021, 05:09, RB PERF. RESULTS CALLED  TO:[id number], RN  Per Hospital Policy: Only change Specimen Src: to \"Line\" if  specified by physician order.  Right Hand    BLOOD CULTURE [[id number]]  (Abnormal) Collected: 10/26/21 0807    Order Status: Completed Specimen: Blood from Peripheral Updated: 10/28/21 1109     Significant Indicator POS     Source BLD     Site PERIPHERAL     Culture Result Growth detected by Bactec instrument. 10/27/2021  08:43      Staphylococcus aureus  See previous culture for sensitivity report.      Narrative:      Per Hospital Policy: Only change Specimen Src: to \"Line\" if  specified by physician order.  Right Wrist    BLOOD CULTURE [[id number]]  (Abnormal) Collected: 10/24/21 1840    Order Status: Completed Specimen: Blood from Peripheral Updated: 10/27/21 1119     Significant Indicator POS     Source BLD     Site PERIPHERAL     Culture Result Growth detected by Bactec " "instrument. 10/25/2021  08:14  Blood culture testing and Gram stain, if indicated, are  performed at [facility], [address] [place] [address].  Positive blood cultures are  sent to [facility], [address] [place] [address], for organism identification and  susceptibility testing.        Staphylococcus aureus  See previous culture for sensitivity report.      Narrative:      2 of 2 blood culture x2  Sites order. Per Hospital Policy:  Only change Specimen Src: to \"Line\" if specified by physician  order.  Left AC    BLOOD CULTURE [[id number]]  (Abnormal)  (Susceptibility) Collected: 10/24/21 9538    Order Status: Completed Specimen: Blood from Peripheral Updated: 10/27/21 1119     Significant Indicator POS     Source BLD     Site PERIPHERAL     Culture Result Growth detected by Bactec instrument. 10/25/2021  07:57  Staphylococcus aureus (methicillin sensitive)  detected by PCR.        Staphylococcus aureus    Narrative:      CALL  Russell  [facility] tel. [phone],  CALLED  [facility] tel. [phone] 10/25/2021, 13:01, RB PERF. RESULTS CALLED  TO:Michael pharm  1 of 2 for Blood Culture x 2 sites order. Per Hospital  Policy: Only change Specimen Src: to \"Line\" if specified by  physician order.  Left AC    Susceptibility     Staphylococcus aureus (1)     Antibiotic Interpretation Microscan Method Status    Azithromycin Sensitive <=2 mcg/mL JAVI Final    Clindamycin Sensitive <=0.25 mcg/mL JAVI Final    Cefazolin Sensitive <=8 mcg/mL JAVI Final    Cefepime Sensitive <=4 mcg/mL JAVI Final    Ceftaroline Sensitive <=0.5 mcg/mL JAVI Final    Daptomycin Sensitive <=0.5 mcg/mL JAVI Final    Ampicillin/sulbactam Sensitive <=8/4 mcg/mL JAVI Final    Erythromycin Sensitive <=0.25 mcg/mL JAVI Final    Vancomycin Sensitive 1 mcg/mL JAVI Final    Oxacillin Sensitive 1 mcg/mL JAVI Final    Pip/Tazobactam Sensitive <=8 mcg/mL JAVI Final    Trimeth/Sulfa Sensitive <=0.5/9.5 mcg/mL JAVI Final    Tetracycline " Sensitive <=4 mcg/mL JAVI Final                   URINALYSIS (UA) [[id number]]  (Abnormal) Collected: 10/24/21 1840    Order Status: Completed Specimen: Urine, Clean Catch Updated: 10/24/21 1958     Color Yellow     Character Clear     Specific Gravity 1.015     Ph 5.5     Glucose >=1000 mg/dL      Ketones Trace mg/dL      Protein Negative mg/dL      Bilirubin Negative     Nitrite Negative     Leukocyte Esterase Negative     Occult Blood Moderate     Micro Urine Req Microscopic          Assessment:  Active Hospital Problems    Diagnosis    • *Vertigo [R42]    • Bacteremia [R78.81]    • Ischemic cardiomyopathy [I25.5]    • Orthostatic hypotension [I95.1]    • Essential hypertension [I10]    • History of stroke- old right parietal/occipital [Z86.73]    • CAD (coronary artery disease) [I25.10]    • Type 2 diabetes mellitus without complication, with long-term current use of insulin (HCC) [E11.9, Z79.4]    • Paroxysmal A-fib (HCC) [I48.0]    • Benign prostatic hyperplasia without lower urinary tract symptoms [N40.0]        Plan:  MSSA sepsis  AICD in place Additional work  Afebrile  No leukocytosis-mild leukopenia  BCxs + MSSA 10/24 and 10/26  Repeat Bcxs 10/28 and 10/30 neg  TTE with right atrial thrombus  PAMELA pending-concern for infection of AICD  Given mult +blood cxs AICD is likely infected and will require explantation  Continue cefazolin  Anticipate 6 weeks IV abx from date of negative blood cxs if no new complications    New back pain  Bilateral shoulder/arm pain  Needs MRI C,T, and L spine    Urinary retention  Andres placed  MRI as above  Normally takes Flomax and Proscar    CVA  Mult CVA by MRI, chronic  No bleed    Immunosuppressed  PV  Lupus      Diabetes  Keep BS under 150 to help control current infection  -[phone]

## 2021-11-01 ENCOUNTER — APPOINTMENT (OUTPATIENT)
Dept: RADIOLOGY | Facility: MEDICAL CENTER | Age: 71
DRG: 872 | End: 2021-11-01
Attending: STUDENT IN AN ORGANIZED HEALTH CARE EDUCATION/TRAINING PROGRAM
Payer: MEDICARE

## 2021-11-01 LAB
ANION GAP SERPL CALC-SCNC: 11 MMOL/L (ref 7–16)
BUN SERPL-MCNC: 8 MG/DL (ref 8–22)
CALCIUM SERPL-MCNC: 8.5 MG/DL (ref 8.4–10.2)
CHLORIDE SERPL-SCNC: 102 MMOL/L (ref 96–112)
CO2 SERPL-SCNC: 24 MMOL/L (ref 20–33)
CREAT SERPL-MCNC: 0.59 MG/DL (ref 0.5–1.4)
ERYTHROCYTE [DISTWIDTH] IN BLOOD BY AUTOMATED COUNT: 61.4 FL (ref 35.9–50)
GLUCOSE BLD-MCNC: 182 MG/DL (ref 65–99)
GLUCOSE BLD-MCNC: 226 MG/DL (ref 65–99)
GLUCOSE BLD-MCNC: 232 MG/DL (ref 65–99)
GLUCOSE SERPL-MCNC: 154 MG/DL (ref 65–99)
HCT VFR BLD AUTO: 43.8 % (ref 42–52)
HGB BLD-MCNC: 14.8 G/DL (ref 14–18)
MCH RBC QN AUTO: 36.2 PG (ref 27–33)
MCHC RBC AUTO-ENTMCNC: 33.8 G/DL (ref 33.7–35.3)
MCV RBC AUTO: 107.1 FL (ref 81.4–97.8)
PLATELET # BLD AUTO: 254 K/UL (ref 164–446)
PMV BLD AUTO: 11.1 FL (ref 9–12.9)
POTASSIUM SERPL-SCNC: 3.2 MMOL/L (ref 3.6–5.5)
RBC # BLD AUTO: 4.09 M/UL (ref 4.7–6.1)
SODIUM SERPL-SCNC: 137 MMOL/L (ref 135–145)
WBC # BLD AUTO: 8 K/UL (ref 4.8–10.8)

## 2021-11-01 PROCEDURE — 700102 HCHG RX REV CODE 250 W/ 637 OVERRIDE(OP): Performed by: INTERNAL MEDICINE

## 2021-11-01 PROCEDURE — A9270 NON-COVERED ITEM OR SERVICE: HCPCS | Performed by: STUDENT IN AN ORGANIZED HEALTH CARE EDUCATION/TRAINING PROGRAM

## 2021-11-01 PROCEDURE — 99232 SBSQ HOSP IP/OBS MODERATE 35: CPT | Performed by: STUDENT IN AN ORGANIZED HEALTH CARE EDUCATION/TRAINING PROGRAM

## 2021-11-01 PROCEDURE — 700102 HCHG RX REV CODE 250 W/ 637 OVERRIDE(OP): Performed by: STUDENT IN AN ORGANIZED HEALTH CARE EDUCATION/TRAINING PROGRAM

## 2021-11-01 PROCEDURE — 36415 COLL VENOUS BLD VENIPUNCTURE: CPT

## 2021-11-01 PROCEDURE — 82962 GLUCOSE BLOOD TEST: CPT | Mod: 91

## 2021-11-01 PROCEDURE — 700111 HCHG RX REV CODE 636 W/ 250 OVERRIDE (IP): Performed by: INTERNAL MEDICINE

## 2021-11-01 PROCEDURE — 770001 HCHG ROOM/CARE - MED/SURG/GYN PRIV*

## 2021-11-01 PROCEDURE — A9270 NON-COVERED ITEM OR SERVICE: HCPCS | Performed by: INTERNAL MEDICINE

## 2021-11-01 PROCEDURE — 85027 COMPLETE CBC AUTOMATED: CPT

## 2021-11-01 PROCEDURE — 700101 HCHG RX REV CODE 250: Performed by: STUDENT IN AN ORGANIZED HEALTH CARE EDUCATION/TRAINING PROGRAM

## 2021-11-01 PROCEDURE — 80048 BASIC METABOLIC PNL TOTAL CA: CPT

## 2021-11-01 PROCEDURE — 99232 SBSQ HOSP IP/OBS MODERATE 35: CPT | Performed by: INTERNAL MEDICINE

## 2021-11-01 RX ORDER — POTASSIUM CHLORIDE 20 MEQ/1
40 TABLET, EXTENDED RELEASE ORAL 2 TIMES DAILY
Status: DISCONTINUED | OUTPATIENT
Start: 2021-11-01 | End: 2021-11-02 | Stop reason: HOSPADM

## 2021-11-01 RX ADMIN — LORAZEPAM 0.5 MG: 2 INJECTION INTRAMUSCULAR; INTRAVENOUS at 11:38

## 2021-11-01 RX ADMIN — APIXABAN 5 MG: 5 TABLET, FILM COATED ORAL at 05:28

## 2021-11-01 RX ADMIN — ASPIRIN 81 MG: 81 TABLET, COATED ORAL at 05:28

## 2021-11-01 RX ADMIN — Medication 5 MG: at 20:04

## 2021-11-01 RX ADMIN — DIGOXIN 125 MCG: 125 TABLET ORAL at 17:51

## 2021-11-01 RX ADMIN — OXYCODONE 5 MG: 5 TABLET ORAL at 05:28

## 2021-11-01 RX ADMIN — LIDOCAINE 1 PATCH: 50 PATCH TOPICAL at 13:21

## 2021-11-01 RX ADMIN — FINASTERIDE 5 MG: 5 TABLET, FILM COATED ORAL at 05:28

## 2021-11-01 RX ADMIN — CEFAZOLIN 2 G: 1 INJECTION, POWDER, FOR SOLUTION INTRAVENOUS at 05:28

## 2021-11-01 RX ADMIN — GUAIFENESIN 200 MG: 100 SOLUTION ORAL at 05:33

## 2021-11-01 RX ADMIN — APIXABAN 5 MG: 5 TABLET, FILM COATED ORAL at 17:51

## 2021-11-01 RX ADMIN — INSULIN HUMAN 3 UNITS: 100 INJECTION, SOLUTION PARENTERAL at 18:01

## 2021-11-01 RX ADMIN — METOPROLOL SUCCINATE 25 MG: 25 TABLET, EXTENDED RELEASE ORAL at 05:28

## 2021-11-01 RX ADMIN — CEFAZOLIN 2 G: 1 INJECTION, POWDER, FOR SOLUTION INTRAVENOUS at 14:26

## 2021-11-01 RX ADMIN — OXYCODONE 5 MG: 5 TABLET ORAL at 20:04

## 2021-11-01 RX ADMIN — CEFAZOLIN 2 G: 1 INJECTION, POWDER, FOR SOLUTION INTRAVENOUS at 21:16

## 2021-11-01 RX ADMIN — GUAIFENESIN 200 MG: 100 SOLUTION ORAL at 20:09

## 2021-11-01 RX ADMIN — SENNOSIDES AND DOCUSATE SODIUM 2 TABLET: 8.6; 5 TABLET ORAL at 17:51

## 2021-11-01 RX ADMIN — TAMSULOSIN HYDROCHLORIDE 0.4 MG: 0.4 CAPSULE ORAL at 13:20

## 2021-11-01 RX ADMIN — INSULIN HUMAN 3 UNITS: 100 INJECTION, SOLUTION PARENTERAL at 13:22

## 2021-11-01 RX ADMIN — POTASSIUM CHLORIDE 40 MEQ: 1500 TABLET, EXTENDED RELEASE ORAL at 17:55

## 2021-11-01 RX ADMIN — LIDOCAINE HYDROCHLORIDE 15 ML: 20 SOLUTION OROPHARYNGEAL at 13:20

## 2021-11-01 ASSESSMENT — PAIN DESCRIPTION - PAIN TYPE
TYPE: ACUTE PAIN
TYPE: ACUTE PAIN

## 2021-11-01 NOTE — PROGRESS NOTES
[facility] Cardiology Follow-up Consult Note  Date of note:    11/1/2021          Patient ID:  Name:   Francesco Schulte   YOB: 1950  Age:   71 y.o.  male   MRN:   [id number]    Chief Complaint   Patient presents with   • Chest Pain     on and off for last few weeks   • Shortness of Breath     since he had covid in january but worsened today    • Weakness     worsened today    • Dizziness     started today       Interim Events:  Patient feeling okay echo is reviewed right atrial mass not clearly associated with the ICD wires but large in size      ROS  No NV, No Bleeding, No dizziness   All other review of systems reviewed and negative.    Past medical, surgical, social, and family history reviewed and unchanged from admission except as noted in assessment and plan.    Medications: Reviewed in MAR  Current Facility-Administered Medications   Medication Dose Frequency Provider Last Rate Last Admin   • potassium chloride SA (Kdur) tablet 40 mEq  40 mEq BID Deb Peres M.D.       • lidocaine (LIDODERM) 5 % 1 Patch  1 Patch Q24HR Deb Peres M.D.   1 Patch at 11/01/21 1321   • hyoscyamine-lidocaine-Maalox (GI Cocktail) oral susp cup 15 mL  15 mL Q4HRS PRN Neptali David D.O.   15 mL at 11/01/21 1320   • aspirin EC (ECOTRIN) tablet 81 mg  81 mg DAILY Deb Peres M.D.   81 mg at 11/01/21 0528   • tamsulosin (FLOMAX) capsule 0.4 mg  0.4 mg AFTER BREAKFAST Deb Peres M.D.   0.4 mg at 11/01/21 1320   • Pharmacy Consult Request ...Pain Management Review 1 Each  1 Each PHARMACY TO DOSE Deb Peres M.D.       • oxyCODONE immediate-release (ROXICODONE) tablet 5 mg  5 mg Q3HRS PRN Deb Peres M.D.   5 mg at 11/01/21 0528    Or   • HYDROmorphone (Dilaudid) injection 0.25 mg  0.25 mg Q3HRS PRN Deb Peres M.D.       • guaiFENesin (ROBITUSSIN) 100 MG/5ML solution 200 mg  10 mL Q4HRS PRN Deb Peres M.D.   200 mg at 11/01/21 0577   • acetaminophen (Tylenol)  tablet 650 mg  650 mg Q4HRS PRN Ramesh Alston M.D.   650 mg at 10/27/21 0505   • ceFAZolin (ANCEF) 2 g in  mL IVPB premix  2 g Q8HRS Pema Asif M.D.   Stopped at 11/01/21 1456   • senna-docusate (PERICOLACE or SENOKOT S) 8.6-50 MG per tablet 2 Tablet  2 Tablet BID Blanquita Moe M.D.   2 Tablet at 10/31/21 1720    And   • polyethylene glycol/lytes (MIRALAX) PACKET 1 Packet  1 Packet QDAY PRN Blanquita Moe M.D.        And   • magnesium hydroxide (MILK OF MAGNESIA) suspension 30 mL  30 mL QDAY PRN Blanquita Moe M.D.        And   • bisacodyl (DULCOLAX) suppository 10 mg  10 mg QDAY PRN Blanquita Moe M.D.       • enalaprilat (Vasotec) injection 1.25 mg 1 mL  1.25 mg Q6HRS PRN Blanquita Moe M.D.       • labetalol (NORMODYNE/TRANDATE) injection 10 mg  10 mg Q4HRS PRN Blanquita Moe M.D.       • insulin regular (HumuLIN R,NovoLIN R) injection  2-9 Units 4X/DAY [facility]   3 Units at 11/01/21 1322    And   • glucose 4 g chewable tablet 16 g  16 g Q15 MIN PRN Blanquita Moe M.D.        And   • dextrose 50% (D50W) injection 50 mL  50 mL Q15 MIN PRN Blanquita Moe M.D.       • apixaban (ELIQUIS) tablet 5 mg  5 mg BID Blanquita Moe M.D.   5 mg at 11/01/21 0528   • digoxin (LANOXIN) tablet 125 mcg  125 mcg DAILY AT 1800 Blanquita Moe M.D.   125 mcg at 10/31/21 1720   • finasteride (PROSCAR) tablet 5 mg  5 mg DAILY Blanquita Moe M.D.   5 mg at 11/01/21 0528   • metoprolol SR (TOPROL XL) tablet 25 mg  25 mg DAILY Blanquita Moe M.D.   25 mg at 11/01/21 0528   • insulin glargine (Semglee) injection  6 Units QAM Blanquita Moe M.D.   6 Units at 10/31/21 0539   • melatonin tablet 5 mg  5 mg Nightly Blanquita Moe M.D.   5 mg at 10/31/21 2200   • ondansetron (ZOFRAN) syringe/vial injection 4 mg  4 mg Q4HRS PRN Blanquita Moe M.D.   4 mg at 10/29/21 1719   • meclizine (ANTIVERT) tablet 25 mg  25 mg TID PRN Blanquita Moe M.D.   25 mg at 10/25/21 0637   • LORazepam (ATIVAN) injection  "0.5 mg  0.5 mg Q4HRS PRN Neptali David D.O.   0.5 mg at 11/01/21 1138   Last reviewed on 10/30/2021  8:40 AM by Danii Field R.N.    Allergies   Allergen Reactions   • Doxycycline      Swelling lips and difficulties swallowing   • Atorvastatin    • Beta Adrenergic Blockers Unspecified     dizziness   • Metformin Unspecified and Palpitations     Cardiac effects  \"Similar to a heart attack, I was hospitalized\"   • Simvastatin      Other reaction(s): Other (Specify with Comments)  Myalgias  Myalgias   • Statins [Hmg-Coa-R Inhibitors]      Muscle ache, joint pain       Physical Exam  Body mass index is 23.84 kg/m². /68   Pulse 80   Temp 37 °C (98.6 °F) (Temporal)   Resp 18   Ht 1.905 m (6' 3\")   Wt 86.5 kg (190 lb 11.2 oz)   SpO2 95%    Vitals:    10/31/21 1353 10/31/21 1612 10/31/21 2300 11/01/21 0500   BP: 141/76 138/70 140/76 137/68   Pulse: 73 72 78 80   Resp: 20 18 18 18   Temp: 36.6 °C (97.9 °F) 36.7 °C (98 °F) 37.1 °C (98.7 °F) 37 °C (98.6 °F)   TempSrc:  Oral Oral Temporal   SpO2: 97% 98% 97% 95%   Weight:       Height:        Oxygen Therapy:  Pulse Oximetry: 95 %, O2 (LPM): 4.5, O2 Delivery Device: Nasal Cannula    General: no apparent distress  Eyes: nl conjunctiva  ENT: OP clear  Neck: no JVD   Lungs: normal respiratory effort  Heart: RRR  EXT no edema bilateral lower extremities.  Abdomen: soft, non tender, non distended,  Neurological: No focal deficits  Psychiatric: Appropriate affect,   Skin: Warm extremities    Labs (personally reviewed and notable for):   Recent Results (from the past 24 hour(s))   TROPONIN    Collection Time: 10/31/21  8:04 PM   Result Value Ref Range    Troponin T 18 6 - 19 ng/L   POCT glucose device results    Collection Time: 10/31/21 10:05 PM   Result Value Ref Range    Glucose - Accu-Ck 152 (H) 65 - 99 mg/dL   CBC WITHOUT DIFFERENTIAL    Collection Time: 11/01/21  5:20 AM   Result Value Ref Range    WBC 8.0 4.8 - 10.8 K/uL    RBC 4.09 (L) 4.70 - 6.10 M/uL    " Hemoglobin 14.8 14.0 - 18.0 g/dL    Hematocrit 43.8 42.0 - 52.0 %    .1 (H) 81.4 - 97.8 fL    MCH 36.2 (H) 27.0 - 33.0 pg    MCHC 33.8 33.7 - 35.3 g/dL    RDW 61.4 (H) 35.9 - 50.0 fL    Platelet Count 254 164 - 446 K/uL    MPV 11.1 9.0 - 12.9 fL   Basic Metabolic Panel    Collection Time: 11/01/21  5:20 AM   Result Value Ref Range    Sodium 137 135 - 145 mmol/L    Potassium 3.2 (L) 3.6 - 5.5 mmol/L    Chloride 102 96 - 112 mmol/L    Co2 24 20 - 33 mmol/L    Glucose 154 (H) 65 - 99 mg/dL    Bun 8 8 - 22 mg/dL    Creatinine 0.59 0.50 - 1.40 mg/dL    Calcium 8.5 8.4 - 10.2 mg/dL    Anion Gap 11.0 7.0 - 16.0   ESTIMATED GFR    Collection Time: 11/01/21  5:20 AM   Result Value Ref Range    GFR If African American >60 >60 mL/min/1.73 m 2    GFR If Non African American >60 >60 mL/min/1.73 m 2   POCT glucose device results    Collection Time: 11/01/21  5:39 AM   Result Value Ref Range    Glucose - Accu-Ck 182 (H) 65 - 99 mg/dL       Cardiac Imaging and Procedures Review:    EKG and telemetry tracings personally reviewed    Impression and Medical Decision Making:  Principal Problem:    Bacteremia POA: Unknown  Active Problems:    Benign prostatic hyperplasia without lower urinary tract symptoms (Chronic) POA: Yes    Paroxysmal A-fib (HCC) (Chronic) POA: Yes    Type 2 diabetes mellitus without complication, with long-term current use of insulin (HCC) POA: Yes      Overview: Chronic condition treated with Glipizide and Lantus.      Sliding scale insulin during acute hospitalization.          CAD (coronary artery disease) POA: Yes    History of stroke- old right parietal/occipital POA: Yes    Essential hypertension POA: Yes    Orthostatic hypotension POA: Yes    Vertigo POA: Yes    Cholelithiasis POA: Unknown    Ischemic cardiomyopathy POA: Yes  Resolved Problems:    * No resolved hospital problems. *    MSSA bacteremia with ICD and right atrial mass   high concern for mass being a vegetation will need AICD extraction  treatment of bacteremia evaluation with imaging implantation    Please transfer him to the main hospital for ICD extraction    I personally discussed his case with  Dr Deb Peres M.D. Dr. Alberto Isaac    It is my pleasure to participate in the care of Mr. Schulte.  Please do not hesitate to contact me with questions or concerns.    Ken Melendez MD PhD [facility]  Cardiologist [facility]    11/1/2021    Please note that this dictation was created using voice recognition software. There may be errors of grammar and possibly content I did not discover before finalizing the note.

## 2021-11-01 NOTE — DISCHARGE PLANNING
Anticipated Discharge Disposition: Home with HH and infusions    Action: LSW attempted to call pt's dtr Radhika at [phone]. The number is not in service.    Barriers to Discharge: ID order    Plan: LSW to follow up with ID for order and follow up with pt's spouse.    1010: LSW received call from Nelida supervisor with aging and disablity for report. LSW provided pt and pt's spouse information. LSW answered questions to the best ability. Per Nelida, there may not be enough to open a case. Intake number #[id number]. Per Nelida, pt's spouse can self report at [phone].

## 2021-11-01 NOTE — PROGRESS NOTES
"Hospital Medicine Daily Progress Note    Date of Service  11/1/2021    Chief Complaint  Francesco Schulte is a 71 y.o. male admitted 10/24/2021 with dizziness    Hospital Course  A 71-year-old man with ho DM, HLD, HFrEF, s/p AICD, Afib (AC with apixaban, BPH, CVA presented with dizziness, lower extremity weakness for 2 days.   CTA showed rigth proximal P2 occlusion. Neurology recommended MRI brain. Blood cultures positive for MSSA. ID recommended continue cefazolin, anticipate 6 weeks of antibiotics.     10/26: Patient reports dizziness on standing, nausea, no strength on b/l legs. Afebrile, hemodynamically stable. On room air. Patient had some pancreatic mass, and supposed to get MRI of pancreas in 2 weeks. MRCP ordered. No stool x2 days. Blood cultures from 10/24 positive for S.aureus  10/27: Patient had chest pain at night, pleuritic. Troponin negative. ECG showed sinus rhythm, incomplete LLB, LVH. Afebrile, hemodynamically stable. Na 133. Tired. No chest pain in the morning. Now able to move legs.  10/28: PT, OT recommended HH. Afebrile, hemodynamically stable.  MRI spine ordered to r/o spinal infection. ID following, recommendations appreciated.  10/29: Patient reports shoulders, arms, back pain, cant hardly move, \"lot of pain\". Afebrile, hemodynamically stable. MRI on Monday. PAMELA tomorrow, NPO midnight. Ordered TTE  10/30: Patient had chest pain last evening, pleuritic in character, troponin x2 negative. No ECG changes. Afebrile, hemodynamically stable. SARS-COV antigen positive. Ordered COVID 19 PCR test.  This morning patient reports b/l shoulder, b/l arm, theeth tightness and pain, chest pain, cant feel right arm.  Patient underwent PAMELA and TTE today.  10/31: Coughing, reports b/l shoulder pain. Afebrile, hemodynamically stable. Echo showed echodense partially mobile mass seen in the right atrium, difficult to assess if this is attached to the ICD wire, no evidence of tricuspid valve endocarditis, " small pericardial effusion without evidence of hemodynamic compromise.  Developed urinary obstruction, Andres was placed. Started tamsulosin. Blood cultures from 10/28 and 10/30 negative.  ID recommending explantation of AICD.  Discussed with cardiology, Dr. Gutierrez, who agrees with removal of AICD. Dr. Gutierrez recommended to wait for couple more days, if last two blood cultures remains negative, then we will transfer the patient to the [facility] hospital for AICD removal.  Around 1:50 pm RRT was called due to chest pain. Patient was walking in a hallway and reported chest pain, left-sided. Similar pain he had 2 days ago. Pain is aggravated with deep breath, cough, and palpation. Vitals stable. Likely pleuritic vs. musculoskeletal in origin. Ordered repeat ecg, troponin, CXR, lidocaine patch. No new changes on ecg. Troponin negative. CXR showed vascular congestion, edema. Ordered 40 mg IV lasix.    Interval Problem Update  11/1: Afebrile, hemodynamically stable. WBC 8.0. K 3.2 replaced. Patient could not tolerate MRI, despite IV ativan. MRI was done partially, will follow. Blood cultures from 10/28 and 10/30 negative to date. Plant to transfer the patient to the [facility] for removal of AICD. Called transfer center, no beds available in telemetry. Need to call transfer center tomorrow if they have telemetry beds opening.    I have personally seen and examined the patient at bedside. I discussed the plan of care with patient, bedside RN, charge RN,  and pharmacy.    Consultants/Specialty  cardiology and infectious disease    Code Status  Full Code    Disposition  Patient is not medically cleared.   Anticipate discharge to to home with close outpatient follow-up.  I have placed the appropriate orders for post-discharge needs.    Review of Systems  Constitutional: Positive for malaise/fatigue. Negative for chills and fever.   HENT: Negative.    Eyes: Negative.    Respiratory: Negative for cough, sputum  production and shortness of breath.    Cardiovascular: Positive for chest pain. Negative for leg swelling.   Gastrointestinal: Positive for abdominal pain. Negative for nausea and vomiting.   Genitourinary: Negative for dysuria.   Musculoskeletal: Positive for joint pain.   Skin: Negative.    Neurological: Positive for weakness.     Physical Exam  Temp:  [36.6 °C (97.9 °F)-37.1 °C (98.7 °F)] 37 °C (98.6 °F)  Pulse:  [72-80] 80  Resp:  [18-20] 18  BP: (137-141)/(68-76) 137/68  SpO2:  [95 %-98 %] 95 %    Physical Exam  Vitals and nursing note reviewed.   Constitutional:       Appearance: He is ill-appearing.   HENT:      Head: Normocephalic.      Mouth/Throat:      Mouth: Mucous membranes are moist.      Pharynx: Oropharynx is clear.   Eyes:      Pupils: Pupils are equal, round, and reactive to light.   Cardiovascular:      Rate and Rhythm: Normal rate and regular rhythm.   Pulmonary:      Effort: Pulmonary effort is normal. No respiratory distress.      Breath sounds: No wheezing or rales.   Abdominal:      General: Abdomen is flat. Bowel sounds are normal. There is no distension.   Musculoskeletal:         General: Normal range of motion.      Cervical back: Normal range of motion.      Comments: Reproducible left chest tenderness.    Fluids    Intake/Output Summary (Last 24 hours) at 11/1/2021 1338  Last data filed at 10/31/2021 1800  Gross per 24 hour   Intake 120 ml   Output 1600 ml   Net -1480 ml       Laboratory  Recent Labs     10/31/21  0331 11/01/21  0520   WBC 8.2 8.0   RBC 4.44* 4.09*   HEMOGLOBIN 15.9 14.8   HEMATOCRIT 48.3 43.8   .8* 107.1*   MCH 35.8* 36.2*   MCHC 32.9* 33.8   RDW 63.6* 61.4*   PLATELETCT 217 254   MPV 11.1 11.1     Recent Labs     10/29/21  1707 10/31/21  0331 11/01/21  0520   SODIUM 135 138 137   POTASSIUM 3.7 3.6 3.2*   CHLORIDE 102 105 102   CO2 19* 19* 24   GLUCOSE 224* 227* 154*   BUN 13 10 8   CREATININE 0.96 0.69 0.59   CALCIUM 8.6 8.4 8.5                    Imaging  DX-CHEST-PORTABLE (1 VIEW)   Final Result      1.  There are changes of vascular congestion/edema.      EC-ECHOCARDIOGRAM LTD W/O CONT   Final Result      EC-PAMELA W/O CONT         DX-CHEST-PORTABLE (1 VIEW)   Final Result      Enlarged cardiac silhouette and interstitial prominence of pleural fluid suggesting congestive heart failure.      MR-BRAIN-W/O   Final Result      1.  Mild cerebral atrophy.   2.  Chronic encephalomalacic changes right lateral occipital lobe, right parietal lobe, right posterior frontal lobe most consistent with old cerebral infarcts.   3.  Small area of chronic encephalomalacic change at the right anterior parasagittal frontal lobe which has developed since the prior exam. This may represent an old infarction versus chronic sequela of posttraumatic brain contusion.   4.  Small area of chronic encephalomalacic change at the inferior surface of the left temporal lobe which was not present on the prior exam from 2018. Lesion location and morphology is most suggestive of chronic posttraumatic brain contusion.   5.  No evidence of acute infarction, acute hemorrhage, or mass lesion.      [id number]-[id number]-K/O   Final Result      1.  There is cholelithiasis without pericholecystic, gallbladder wall thickening or biliary dilatation.   2.  There is no choledocholithiasis.   3.  There is a potential 2 cm cystic lesion in the pancreatic tail region/splenic hilum.      CT-CTA NECK WITH & W/O-POST PROCESSING   Final Result         1.  No high-grade stenosis, occlusion, or aneurysm appreciated.         CT-CTA HEAD WITH & W/O-POST PROCESS   Final Result         1.  Right proximal P2 occlusion, reconstitutes distally   2.  Changes of atrophy with nonspecific white matter changes, most commonly associated with small vessel ischemia.      These findings were discussed with the patient's clinician, Dr. Farooq, on 10/24/2021 10:12 PM.      DX-CHEST-PORTABLE (1 VIEW)   Final Result      No acute cardiac or  pulmonary abnormality is noted.      MR-CERVICAL SPINE-WITH & W/O    (Results Pending)   MR-THORACIC SPINE-WITH & W/O    (Results Pending)   MR-LUMBAR SPINE-WITH & W/O    (Results Pending)        Assessment/Plan  * Bacteremia  Assessment & Plan  MSSA  Patient with incidental blood cultures positive for gram positive cocci concerning for staph species  UA negative and CXR nonrevealing  Echo showed echodense partially mobile mass seen in the right atrium, difficult to assess if this is attached to the ICD wire, no evidence of tricuspid valve endocarditis, small pericardial effusion without evidence of hemodynamic compromise.  ID following, recommendations appreciated: explantation of AICD  Discussed with cardiology, Dr. Gutierrez, who agrees with removal of AICD. Dr. Gutierrez recommended to wait for couple more days, if last two blood cultures remains negative, then we will transfer the patient to the Main hospital for AICD removal.  Continue cefazolin    Ischemic cardiomyopathy- (present on admission)  Assessment & Plan  -Per cardiology notes. Status post AICD 09/2021  -Last echo 05/2021 with a EF of 15-20%  -Continue metoprolol and digoxin. Not on ACEI/ARB due to intolerance per cardiology notes  -Patient is to be on spironolactone per cardiology note 09/2021 however says is not taking it  -Med rec fluids bumetanide, patient states he is not on any diuretic  - no active volume overload at this time     Vertigo- (present on admission)  Assessment & Plan  -pt states he has a hx of vertigo which had resolved, complaining of vertigo now, dizziness like room spinning with rapid movement of head to one side  -pt with hx of CVA and CAD. Bilateral nystagmus on exam concerning for central cause   -Nanette-Hallpike Maneuver for further eval not done since pt too nauseous   -meclizine and zofran for symptom control  -CT head and neck shows right PCA occlusion however unclear if acute or chronic as patient has had previous posterior  circulation stroke  MRI brain showed chronic multiple infarctions  Neurology recommended to resume anticoagulation for stroke prevention    Orthostatic hypotension- (present on admission)  Assessment & Plan  -Reported history of orthostatic hypotension per notes, currently complaining of dizziness upon change of position consistent with orthostatic hypotension  -check orthostatic vitals   -Would be difficult to keep patient hydrated due to history of HFrEF    Essential hypertension- (present on admission)  Assessment & Plan  -Continue metoprolol    History of stroke- old right parietal/occipital- (present on admission)  Assessment & Plan  Continue aspirin. Patient not on statin due to history of statin intolerance    CAD (coronary artery disease)- (present on admission)  Assessment & Plan  Continue aspirin. Patient not on statin due to history of statin intolerance  Goal directed medical therapy as tolerated  No active chest pain at this time     Type 2 diabetes mellitus without complication, with long-term current use of insulin (HCC)- (present on admission)  Assessment & Plan  -Continue long-acting insulin  -Insulin sliding scale and hypoglycemia protocol while in hospital    Paroxysmal A-fib (HCC)- (present on admission)  Assessment & Plan  -Rate controlled, not in RVR  -Continue metoprolol, digoxin   -resumed apixaban per neurology on 10/27    Benign prostatic hyperplasia without lower urinary tract symptoms- (present on admission)  Assessment & Plan  Continue finasteride  On 10/31 patient developed urinary obstruction  Andres placed on 10/31.  Started on tamsulosin       VTE prophylaxis: therapeutic anticoagulation with apixaban    I have performed a physical exam and reviewed and updated ROS and Plan today (11/1/2021). In review of yesterday's note (10/31/2021), there are no changes except as documented above.

## 2021-11-01 NOTE — CARE PLAN
Problem: Knowledge Deficit - Standard  Goal: Patient and family/care givers will demonstrate understanding of plan of care, disease process/condition, diagnostic tests and medications  Outcome: Progressing     Problem: Pain - Standard  Goal: Alleviation of pain or a reduction in pain to the patient’s comfort goal  Outcome: Progressing     Problem: Fall Risk  Goal: Patient will remain free from falls  Outcome: Progressing   The patient is Stable - Low risk of patient condition declining or worsening    Shift Goals  Clinical Goals: manage pt pain   Patient Goals: Strait cath as needed. sleep  Family Goals: Call with changes     Progress made toward(s) clinical / shift goals:  all goals     Patient is not progressing towards the following goals:

## 2021-11-01 NOTE — PROGRESS NOTES
Telemetry Shift Summary     Rhythm SR with BBB  HR Range 68-75  Ectopy R-couplet/trigem; O-PVC  Measurements 0.20/0.12/0.42           Normal Values  Rhythm SR  HR Range    Measurements 0.12-0.20 / 0.06-0.10  / 0.30-0.52

## 2021-11-01 NOTE — CARE PLAN
The patient is Watcher - Medium risk of patient condition declining or worsening    Shift Goals  Clinical Goals: safety, manage pain  Patient Goals: Strait cath as needed. sleep  Family Goals: no family available    Progress made toward(s) clinical / shift goals:       Problem: Knowledge Deficit - Standard  Goal: Patient and family/care givers will demonstrate understanding of plan of care, disease process/condition, diagnostic tests and medications  Outcome: Progressing     Problem: Pain - Standard  Goal: Alleviation of pain or a reduction in pain to the patient’s comfort goal  Outcome: Progressing     Problem: Fall Risk  Goal: Patient will remain free from falls  Outcome: Progressing    Patient AOX4, denies nausea, c/o left side rib pain with medication and repositioning being effective.  Andres draining dark daugherty color urine.  Patient on 4.5L O2 NC.  Patient scheduled for MRI, patient unable to tolerate, cancelled 1/2 way through.

## 2021-11-01 NOTE — PROGRESS NOTES
"Charge RN note-     Primary RN notified this RN that patient feels like he \"is having a panic attack\" in MRI. This RN administered Ativan IV per MAR.   "

## 2021-11-01 NOTE — DISCHARGE PLANNING
Good Morning,  We are still missing [facility] orders. Referral still placed on hold.      Thanks,   [facility]

## 2021-11-02 ENCOUNTER — HOSPITAL ENCOUNTER (INPATIENT)
Facility: MEDICAL CENTER | Age: 71
LOS: 7 days | DRG: 261 | End: 2021-11-09
Attending: STUDENT IN AN ORGANIZED HEALTH CARE EDUCATION/TRAINING PROGRAM | Admitting: INTERNAL MEDICINE
Payer: MEDICARE

## 2021-11-02 VITALS
OXYGEN SATURATION: 93 % | BODY MASS INDEX: 23.71 KG/M2 | HEIGHT: 75 IN | TEMPERATURE: 98.9 F | RESPIRATION RATE: 18 BRPM | HEART RATE: 80 BPM | WEIGHT: 190.7 LBS | SYSTOLIC BLOOD PRESSURE: 165 MMHG | DIASTOLIC BLOOD PRESSURE: 86 MMHG

## 2021-11-02 DIAGNOSIS — R54 AGE-RELATED PHYSICAL DEBILITY: ICD-10-CM

## 2021-11-02 DIAGNOSIS — R78.81 BACTEREMIA: ICD-10-CM

## 2021-11-02 DIAGNOSIS — R10.84 GENERALIZED ABDOMINAL PAIN: ICD-10-CM

## 2021-11-02 DIAGNOSIS — I33.0 ACUTE BACTERIAL ENDOCARDITIS: ICD-10-CM

## 2021-11-02 LAB
ALBUMIN SERPL BCP-MCNC: 2.6 G/DL (ref 3.2–4.9)
ALBUMIN/GLOB SERPL: 0.7 G/DL
ALP SERPL-CCNC: 78 U/L (ref 30–99)
ALT SERPL-CCNC: 6 U/L (ref 2–50)
ANION GAP SERPL CALC-SCNC: 10 MMOL/L (ref 7–16)
AST SERPL-CCNC: 12 U/L (ref 12–45)
BACTERIA BLD CULT: NORMAL
BACTERIA BLD CULT: NORMAL
BASOPHILS # BLD AUTO: 0.9 % (ref 0–1.8)
BASOPHILS # BLD: 0.07 K/UL (ref 0–0.12)
BILIRUB SERPL-MCNC: 0.7 MG/DL (ref 0.1–1.5)
BUN SERPL-MCNC: 10 MG/DL (ref 8–22)
CALCIUM SERPL-MCNC: 8.6 MG/DL (ref 8.4–10.2)
CHLORIDE SERPL-SCNC: 104 MMOL/L (ref 96–112)
CO2 SERPL-SCNC: 26 MMOL/L (ref 20–33)
CREAT SERPL-MCNC: 0.55 MG/DL (ref 0.5–1.4)
EOSINOPHIL # BLD AUTO: 0.11 K/UL (ref 0–0.51)
EOSINOPHIL NFR BLD: 1.5 % (ref 0–6.9)
ERYTHROCYTE [DISTWIDTH] IN BLOOD BY AUTOMATED COUNT: 61.8 FL (ref 35.9–50)
GLOBULIN SER CALC-MCNC: 4 G/DL (ref 1.9–3.5)
GLUCOSE BLD-MCNC: 212 MG/DL (ref 65–99)
GLUCOSE BLD-MCNC: 242 MG/DL (ref 65–99)
GLUCOSE BLD-MCNC: 282 MG/DL (ref 65–99)
GLUCOSE SERPL-MCNC: 184 MG/DL (ref 65–99)
HCT VFR BLD AUTO: 43.9 % (ref 42–52)
HGB BLD-MCNC: 14.3 G/DL (ref 14–18)
IMM GRANULOCYTES # BLD AUTO: 0.1 K/UL (ref 0–0.11)
IMM GRANULOCYTES NFR BLD AUTO: 1.4 % (ref 0–0.9)
LYMPHOCYTES # BLD AUTO: 0.47 K/UL (ref 1–4.8)
LYMPHOCYTES NFR BLD: 6.4 % (ref 22–41)
MCH RBC QN AUTO: 36.1 PG (ref 27–33)
MCHC RBC AUTO-ENTMCNC: 32.6 G/DL (ref 33.7–35.3)
MCV RBC AUTO: 110.9 FL (ref 81.4–97.8)
MONOCYTES # BLD AUTO: 0.77 K/UL (ref 0–0.85)
MONOCYTES NFR BLD AUTO: 10.4 % (ref 0–13.4)
NEUTROPHILS # BLD AUTO: 5.85 K/UL (ref 1.82–7.42)
NEUTROPHILS NFR BLD: 79.4 % (ref 44–72)
NRBC # BLD AUTO: 0 K/UL
NRBC BLD-RTO: 0 /100 WBC
PLATELET # BLD AUTO: 270 K/UL (ref 164–446)
PMV BLD AUTO: 11 FL (ref 9–12.9)
POTASSIUM SERPL-SCNC: 3.7 MMOL/L (ref 3.6–5.5)
PROT SERPL-MCNC: 6.6 G/DL (ref 6–8.2)
RBC # BLD AUTO: 3.96 M/UL (ref 4.7–6.1)
SIGNIFICANT IND 70042: NORMAL
SIGNIFICANT IND 70042: NORMAL
SITE SITE: NORMAL
SITE SITE: NORMAL
SODIUM SERPL-SCNC: 140 MMOL/L (ref 135–145)
SOURCE SOURCE: NORMAL
SOURCE SOURCE: NORMAL
WBC # BLD AUTO: 7.4 K/UL (ref 4.8–10.8)

## 2021-11-02 PROCEDURE — 99223 1ST HOSP IP/OBS HIGH 75: CPT | Performed by: INTERNAL MEDICINE

## 2021-11-02 PROCEDURE — 72158 MRI LUMBAR SPINE W/O & W/DYE: CPT | Mod: ME

## 2021-11-02 PROCEDURE — 700102 HCHG RX REV CODE 250 W/ 637 OVERRIDE(OP): Performed by: STUDENT IN AN ORGANIZED HEALTH CARE EDUCATION/TRAINING PROGRAM

## 2021-11-02 PROCEDURE — 700117 HCHG RX CONTRAST REV CODE 255: Performed by: STUDENT IN AN ORGANIZED HEALTH CARE EDUCATION/TRAINING PROGRAM

## 2021-11-02 PROCEDURE — 72156 MRI NECK SPINE W/O & W/DYE: CPT | Mod: ME

## 2021-11-02 PROCEDURE — A9576 INJ PROHANCE MULTIPACK: HCPCS | Performed by: STUDENT IN AN ORGANIZED HEALTH CARE EDUCATION/TRAINING PROGRAM

## 2021-11-02 PROCEDURE — 36415 COLL VENOUS BLD VENIPUNCTURE: CPT

## 2021-11-02 PROCEDURE — 770001 HCHG ROOM/CARE - MED/SURG/GYN PRIV*

## 2021-11-02 PROCEDURE — 94760 N-INVAS EAR/PLS OXIMETRY 1: CPT

## 2021-11-02 PROCEDURE — 99239 HOSP IP/OBS DSCHRG MGMT >30: CPT | Performed by: INTERNAL MEDICINE

## 2021-11-02 PROCEDURE — A9270 NON-COVERED ITEM OR SERVICE: HCPCS | Performed by: STUDENT IN AN ORGANIZED HEALTH CARE EDUCATION/TRAINING PROGRAM

## 2021-11-02 PROCEDURE — 99233 SBSQ HOSP IP/OBS HIGH 50: CPT | Performed by: INTERNAL MEDICINE

## 2021-11-02 PROCEDURE — 85025 COMPLETE CBC W/AUTO DIFF WBC: CPT

## 2021-11-02 PROCEDURE — 82962 GLUCOSE BLOOD TEST: CPT | Mod: 91

## 2021-11-02 PROCEDURE — 700101 HCHG RX REV CODE 250: Performed by: STUDENT IN AN ORGANIZED HEALTH CARE EDUCATION/TRAINING PROGRAM

## 2021-11-02 PROCEDURE — 700111 HCHG RX REV CODE 636 W/ 250 OVERRIDE (IP): Performed by: INTERNAL MEDICINE

## 2021-11-02 PROCEDURE — 80053 COMPREHEN METABOLIC PANEL: CPT

## 2021-11-02 PROCEDURE — 72157 MRI CHEST SPINE W/O & W/DYE: CPT | Mod: MG

## 2021-11-02 RX ORDER — ENALAPRILAT 1.25 MG/ML
1.25 INJECTION INTRAVENOUS EVERY 6 HOURS PRN
Status: CANCELLED | OUTPATIENT
Start: 2021-11-02

## 2021-11-02 RX ORDER — HYDROMORPHONE HYDROCHLORIDE 1 MG/ML
0.25 INJECTION, SOLUTION INTRAMUSCULAR; INTRAVENOUS; SUBCUTANEOUS
Status: CANCELLED | OUTPATIENT
Start: 2021-11-02

## 2021-11-02 RX ORDER — AMOXICILLIN 250 MG
2 CAPSULE ORAL 2 TIMES DAILY
Status: CANCELLED | OUTPATIENT
Start: 2021-11-02

## 2021-11-02 RX ORDER — ACETAMINOPHEN 325 MG/1
650 TABLET ORAL EVERY 4 HOURS PRN
Status: DISCONTINUED | OUTPATIENT
Start: 2021-11-02 | End: 2021-11-06

## 2021-11-02 RX ORDER — FINASTERIDE 5 MG/1
5 TABLET, FILM COATED ORAL DAILY
Status: CANCELLED | OUTPATIENT
Start: 2021-11-03

## 2021-11-02 RX ORDER — FINASTERIDE 5 MG/1
5 TABLET, FILM COATED ORAL DAILY
Status: DISCONTINUED | OUTPATIENT
Start: 2021-11-03 | End: 2021-11-09 | Stop reason: HOSPADM

## 2021-11-02 RX ORDER — LORAZEPAM 2 MG/ML
0.5 INJECTION INTRAMUSCULAR EVERY 4 HOURS PRN
Status: DISCONTINUED | OUTPATIENT
Start: 2021-11-02 | End: 2021-11-09 | Stop reason: HOSPADM

## 2021-11-02 RX ORDER — POLYETHYLENE GLYCOL 3350 17 G/17G
1 POWDER, FOR SOLUTION ORAL
Status: DISCONTINUED | OUTPATIENT
Start: 2021-11-02 | End: 2021-11-07

## 2021-11-02 RX ORDER — MECLIZINE HYDROCHLORIDE 25 MG/1
25 TABLET ORAL 3 TIMES DAILY PRN
Status: CANCELLED | OUTPATIENT
Start: 2021-11-02

## 2021-11-02 RX ORDER — ONDANSETRON 2 MG/ML
4 INJECTION INTRAMUSCULAR; INTRAVENOUS EVERY 4 HOURS PRN
Status: DISCONTINUED | OUTPATIENT
Start: 2021-11-02 | End: 2021-11-09 | Stop reason: HOSPADM

## 2021-11-02 RX ORDER — DEXTROSE MONOHYDRATE 25 G/50ML
50 INJECTION, SOLUTION INTRAVENOUS
Status: DISCONTINUED | OUTPATIENT
Start: 2021-11-02 | End: 2021-11-07

## 2021-11-02 RX ORDER — TAMSULOSIN HYDROCHLORIDE 0.4 MG/1
0.4 CAPSULE ORAL
Status: CANCELLED | OUTPATIENT
Start: 2021-11-03

## 2021-11-02 RX ORDER — DEXTROSE MONOHYDRATE 25 G/50ML
50 INJECTION, SOLUTION INTRAVENOUS
Status: CANCELLED | OUTPATIENT
Start: 2021-11-02

## 2021-11-02 RX ORDER — LABETALOL HYDROCHLORIDE 5 MG/ML
10 INJECTION, SOLUTION INTRAVENOUS EVERY 4 HOURS PRN
Status: DISCONTINUED | OUTPATIENT
Start: 2021-11-02 | End: 2021-11-09 | Stop reason: HOSPADM

## 2021-11-02 RX ORDER — AMOXICILLIN 250 MG
2 CAPSULE ORAL 2 TIMES DAILY
Status: DISCONTINUED | OUTPATIENT
Start: 2021-11-03 | End: 2021-11-07

## 2021-11-02 RX ORDER — BISACODYL 10 MG
10 SUPPOSITORY, RECTAL RECTAL
Status: CANCELLED | OUTPATIENT
Start: 2021-11-02

## 2021-11-02 RX ORDER — CHOLECALCIFEROL (VITAMIN D3) 125 MCG
5 CAPSULE ORAL NIGHTLY
Status: DISCONTINUED | OUTPATIENT
Start: 2021-11-02 | End: 2021-11-09 | Stop reason: HOSPADM

## 2021-11-02 RX ORDER — CEFAZOLIN SODIUM 2 G/100ML
2 INJECTION, SOLUTION INTRAVENOUS EVERY 8 HOURS
Status: DISCONTINUED | OUTPATIENT
Start: 2021-11-02 | End: 2021-11-09 | Stop reason: HOSPADM

## 2021-11-02 RX ORDER — ONDANSETRON 2 MG/ML
4 INJECTION INTRAMUSCULAR; INTRAVENOUS EVERY 4 HOURS PRN
Status: CANCELLED | OUTPATIENT
Start: 2021-11-02

## 2021-11-02 RX ORDER — OXYCODONE HYDROCHLORIDE 5 MG/1
5 TABLET ORAL
Status: CANCELLED | OUTPATIENT
Start: 2021-11-02

## 2021-11-02 RX ORDER — CHOLECALCIFEROL (VITAMIN D3) 125 MCG
5 CAPSULE ORAL NIGHTLY
Status: CANCELLED | OUTPATIENT
Start: 2021-11-02

## 2021-11-02 RX ORDER — ENALAPRILAT 1.25 MG/ML
1.25 INJECTION INTRAVENOUS EVERY 6 HOURS PRN
Status: DISCONTINUED | OUTPATIENT
Start: 2021-11-02 | End: 2021-11-09 | Stop reason: HOSPADM

## 2021-11-02 RX ORDER — METOPROLOL SUCCINATE 25 MG/1
25 TABLET, EXTENDED RELEASE ORAL DAILY
Status: DISCONTINUED | OUTPATIENT
Start: 2021-11-03 | End: 2021-11-09 | Stop reason: HOSPADM

## 2021-11-02 RX ORDER — LIDOCAINE 50 MG/G
1 PATCH TOPICAL EVERY 24 HOURS
Status: DISCONTINUED | OUTPATIENT
Start: 2021-11-03 | End: 2021-11-06

## 2021-11-02 RX ORDER — LABETALOL HYDROCHLORIDE 5 MG/ML
10 INJECTION, SOLUTION INTRAVENOUS EVERY 4 HOURS PRN
Status: CANCELLED | OUTPATIENT
Start: 2021-11-02

## 2021-11-02 RX ORDER — HYDROMORPHONE HYDROCHLORIDE 1 MG/ML
0.25 INJECTION, SOLUTION INTRAMUSCULAR; INTRAVENOUS; SUBCUTANEOUS
Status: DISCONTINUED | OUTPATIENT
Start: 2021-11-02 | End: 2021-11-05

## 2021-11-02 RX ORDER — CEFAZOLIN SODIUM IN 0.9 % NACL 2 G/100 ML
2 PLASTIC BAG, INJECTION (ML) INTRAVENOUS EVERY 8 HOURS
Status: CANCELLED | OUTPATIENT
Start: 2021-11-02 | End: 2021-12-06

## 2021-11-02 RX ORDER — DIGOXIN 125 MCG
125 TABLET ORAL DAILY
Status: DISCONTINUED | OUTPATIENT
Start: 2021-11-03 | End: 2021-11-09 | Stop reason: HOSPADM

## 2021-11-02 RX ORDER — POLYETHYLENE GLYCOL 3350 17 G/17G
1 POWDER, FOR SOLUTION ORAL
Status: CANCELLED | OUTPATIENT
Start: 2021-11-02

## 2021-11-02 RX ORDER — OXYCODONE HYDROCHLORIDE 5 MG/1
5 TABLET ORAL
Status: DISCONTINUED | OUTPATIENT
Start: 2021-11-02 | End: 2021-11-05

## 2021-11-02 RX ORDER — MECLIZINE HYDROCHLORIDE 25 MG/1
25 TABLET ORAL 3 TIMES DAILY PRN
Status: DISCONTINUED | OUTPATIENT
Start: 2021-11-02 | End: 2021-11-09 | Stop reason: HOSPADM

## 2021-11-02 RX ORDER — LORAZEPAM 2 MG/ML
0.5 INJECTION INTRAMUSCULAR EVERY 4 HOURS PRN
Status: CANCELLED | OUTPATIENT
Start: 2021-11-02

## 2021-11-02 RX ORDER — BISACODYL 10 MG
10 SUPPOSITORY, RECTAL RECTAL
Status: DISCONTINUED | OUTPATIENT
Start: 2021-11-02 | End: 2021-11-07

## 2021-11-02 RX ORDER — ACETAMINOPHEN 325 MG/1
650 TABLET ORAL EVERY 4 HOURS PRN
Status: CANCELLED | OUTPATIENT
Start: 2021-11-02

## 2021-11-02 RX ORDER — METOPROLOL SUCCINATE 25 MG/1
25 TABLET, EXTENDED RELEASE ORAL DAILY
Status: CANCELLED | OUTPATIENT
Start: 2021-11-03

## 2021-11-02 RX ORDER — DIGOXIN 125 MCG
125 TABLET ORAL DAILY
Status: CANCELLED | OUTPATIENT
Start: 2021-11-02

## 2021-11-02 RX ORDER — TAMSULOSIN HYDROCHLORIDE 0.4 MG/1
0.4 CAPSULE ORAL
Status: DISCONTINUED | OUTPATIENT
Start: 2021-11-03 | End: 2021-11-09 | Stop reason: HOSPADM

## 2021-11-02 RX ORDER — LIDOCAINE 50 MG/G
1 PATCH TOPICAL EVERY 24 HOURS
Status: CANCELLED | OUTPATIENT
Start: 2021-11-02

## 2021-11-02 RX ADMIN — OXYCODONE 5 MG: 5 TABLET ORAL at 19:58

## 2021-11-02 RX ADMIN — OXYCODONE 5 MG: 5 TABLET ORAL at 06:41

## 2021-11-02 RX ADMIN — APIXABAN 5 MG: 5 TABLET, FILM COATED ORAL at 17:15

## 2021-11-02 RX ADMIN — APIXABAN 5 MG: 5 TABLET, FILM COATED ORAL at 06:41

## 2021-11-02 RX ADMIN — DIGOXIN 125 MCG: 125 TABLET ORAL at 17:15

## 2021-11-02 RX ADMIN — CEFAZOLIN SODIUM 2 G: 2 INJECTION, SOLUTION INTRAVENOUS at 23:50

## 2021-11-02 RX ADMIN — POTASSIUM CHLORIDE 40 MEQ: 1500 TABLET, EXTENDED RELEASE ORAL at 06:41

## 2021-11-02 RX ADMIN — CEFAZOLIN 2 G: 1 INJECTION, POWDER, FOR SOLUTION INTRAVENOUS at 13:41

## 2021-11-02 RX ADMIN — CEFAZOLIN 2 G: 1 INJECTION, POWDER, FOR SOLUTION INTRAVENOUS at 06:38

## 2021-11-02 RX ADMIN — INSULIN HUMAN 5 UNITS: 100 INJECTION, SOLUTION PARENTERAL at 12:34

## 2021-11-02 RX ADMIN — OXYCODONE 5 MG: 5 TABLET ORAL at 17:16

## 2021-11-02 RX ADMIN — INSULIN HUMAN 3 UNITS: 100 INJECTION, SOLUTION PARENTERAL at 17:20

## 2021-11-02 RX ADMIN — METOPROLOL SUCCINATE 25 MG: 25 TABLET, EXTENDED RELEASE ORAL at 06:41

## 2021-11-02 RX ADMIN — OXYCODONE 5 MG: 5 TABLET ORAL at 12:28

## 2021-11-02 RX ADMIN — SENNOSIDES AND DOCUSATE SODIUM 2 TABLET: 8.6; 5 TABLET ORAL at 17:15

## 2021-11-02 RX ADMIN — GUAIFENESIN 200 MG: 100 SOLUTION ORAL at 00:32

## 2021-11-02 RX ADMIN — LORAZEPAM 0.5 MG: 2 INJECTION INTRAMUSCULAR; INTRAVENOUS at 09:46

## 2021-11-02 RX ADMIN — POTASSIUM CHLORIDE 40 MEQ: 1500 TABLET, EXTENDED RELEASE ORAL at 17:15

## 2021-11-02 RX ADMIN — FINASTERIDE 5 MG: 5 TABLET, FILM COATED ORAL at 06:41

## 2021-11-02 RX ADMIN — OXYCODONE 5 MG: 5 TABLET ORAL at 00:30

## 2021-11-02 RX ADMIN — GUAIFENESIN 200 MG: 100 SOLUTION ORAL at 06:40

## 2021-11-02 RX ADMIN — LIDOCAINE 1 PATCH: 50 PATCH TOPICAL at 13:42

## 2021-11-02 RX ADMIN — ASPIRIN 81 MG: 81 TABLET, COATED ORAL at 06:41

## 2021-11-02 RX ADMIN — TAMSULOSIN HYDROCHLORIDE 0.4 MG: 0.4 CAPSULE ORAL at 08:56

## 2021-11-02 RX ADMIN — GADOTERIDOL 18 ML: 279.3 INJECTION, SOLUTION INTRAVENOUS at 11:30

## 2021-11-02 ASSESSMENT — ENCOUNTER SYMPTOMS
BLURRED VISION: 0
FEVER: 0
WEAKNESS: 1
STRIDOR: 0
HEMOPTYSIS: 0
WEIGHT LOSS: 0
INSOMNIA: 0
DEPRESSION: 0
COUGH: 0
DOUBLE VISION: 0
SHORTNESS OF BREATH: 0
NAUSEA: 0
VOMITING: 0
MYALGIAS: 0
HEADACHES: 0
DIZZINESS: 0
PALPITATIONS: 0
FOCAL WEAKNESS: 1
NAUSEA: 0
NECK PAIN: 0
BRUISES/BLEEDS EASILY: 0
FEVER: 0
COUGH: 0
BACK PAIN: 1
VOMITING: 0
SORE THROAT: 0

## 2021-11-02 ASSESSMENT — PAIN DESCRIPTION - PAIN TYPE
TYPE: ACUTE PAIN

## 2021-11-02 NOTE — DISCHARGE SUMMARY
Discharge Summary    CHIEF COMPLAINT ON ADMISSION  Chief Complaint   Patient presents with   • Chest Pain     on and off for last few weeks   • Shortness of Breath     since he had covid in january but worsened today    • Weakness     worsened today    • Dizziness     started today       Reason for Admission  Chest Pain      CODE STATUS  Full Code    HPI & HOSPITAL COURSE    The patient is a 71-year-old male with a past medical history of type 2 diabetes mellitus, dyslipidemia, heart failure with reduced ejection fraction status post AICD, A. fib on anticoagulation and BPH presents to the ED with a chief complaint of dizziness and lower extremity weakness.  There was initial concern for stroke however MRI was obtained and neurology states that there are no findings of acute infarct.  His dizziness as well as lower extremity weakness have improved since that time.  Patient was incidentally found to have positive blood cultures with MSSA.  ID was consulted with recommendations for 6 weeks of IV cefazolin.  PAMELA was obtained and shows echodense partially mobile mass seen in the right atrium, difficult to assess if this is attached to the ICD wire, no evidence of tricuspid valve endocarditis, small pericardial effusion without evidence of hemodynamic compromise. Of note, patient did have some radiculopathic pain on his right arm associated with shoulder pain. There was concern for possible bacterial seeding causing osteomyelitis. MRI of cervical spine is pending. The case was discussed with cardiology and ID with recommendations for removal of AICD given concerns as nidus of bacteremia.  He will be transferred to [facility] for this procedure.    Therefore, he is discharged in fair and stable condition to a critical access hospital.    The patient met 2-midnight criteria for an inpatient stay at the time of discharge.      FOLLOW UP ITEMS POST DISCHARGE  Follow up with ID for completion of  antibiotics  Follow up with PCP in on week post discharge    DISCHARGE DIAGNOSES  Principal Problem:    Bacteremia POA: Unknown  Active Problems:    History of stroke- old right parietal/occipital POA: Yes    Vertigo POA: Yes    Benign prostatic hyperplasia without lower urinary tract symptoms (Chronic) POA: Yes    Paroxysmal A-fib (HCC) (Chronic) POA: Yes    Type 2 diabetes mellitus without complication, with long-term current use of insulin (HCC) POA: Yes      Overview: Chronic condition treated with Glipizide and Lantus.      Sliding scale insulin during acute hospitalization.          CAD (coronary artery disease) POA: Yes    Essential hypertension POA: Yes    Orthostatic hypotension POA: Yes    Cholelithiasis POA: Unknown    Ischemic cardiomyopathy POA: Yes  Resolved Problems:    * No resolved hospital problems. *      FOLLOW UP  No future appointments.  No follow-up provider specified.    MEDICATIONS ON DISCHARGE     Medication List      CONTINUE taking these medications      Instructions   * Blood Glucose Test Strips   verio test strips for glucose monitoring TID and PRN     * OneTouch Verio w/Device Kit   1 Device 3 times a day before meals.  Dose: 1 Device         * This list has 2 medication(s) that are the same as other medications prescribed for you. Read the directions carefully, and ask your doctor or other care provider to review them with you.            ASK your doctor about these medications      Instructions   apixaban 5mg Tabs  Commonly known as: Eliquis   Take 1 Tablet by mouth 2 times a day.  Dose: 5 mg     aspirin EC 81 MG Tbec  Commonly known as: ECOTRIN   Take 1 Tab by mouth every day.  Dose: 81 mg     bumetanide 0.5 MG Tabs  Commonly known as: BUMEX   Take 1 tablet by mouth every day.  Dose: 0.5 mg     dicyclomine 10 MG Caps  Commonly known as: BENTYL   Take 10 mg by mouth every day.  Dose: 10 mg     digoxin 125 MCG Tabs  Commonly known as: LANOXIN   Take 1 tablet by mouth every day at 6  "PM.  Dose: 125 mcg     docusate sodium 100 MG Caps  Commonly known as: COLACE   Take 1 capsule by mouth 2 times a day as needed for Constipation.  Dose: 100 mg     finasteride 5 MG Tabs  Commonly known as: PROSCAR   TAKE 1 TABLET BY MOUTH EVERY DAY     hydroxyurea 500 MG Caps  Commonly known as: HYDREA   Take 500 mg by mouth 2 Times a Day.  Dose: 500 mg     metoprolol SR 25 MG Tb24  Commonly known as: TOPROL XL   Take 1 tablet by mouth every evening.  Dose: 25 mg     nitroglycerin 0.4 MG Subl  Commonly known as: NITROSTAT   Place 1 Tablet under the tongue as needed for Chest Pain (or hypertension >180/110).  Dose: 0.4 mg     tamsulosin 0.4 MG capsule  Commonly known as: FLOMAX   Take 0.4 mg by mouth every day.  Dose: 0.4 mg     therapeutic multivitamin-minerals Tabs   Take 1 Tab by mouth every day.  Dose: 1 Tablet     Tresiba FlexTouch 200 UNIT/ML Sopn  Generic drug: Insulin Degludec   Inject 12 Units under the skin every evening.  Dose: 12 Units            Allergies  Allergies   Allergen Reactions   • Doxycycline      Swelling lips and difficulties swallowing   • Atorvastatin    • Beta Adrenergic Blockers Unspecified     dizziness   • Metformin Unspecified and Palpitations     Cardiac effects  \"Similar to a heart attack, I was hospitalized\"   • Simvastatin      Other reaction(s): Other (Specify with Comments)  Myalgias  Myalgias   • Statins [Hmg-Coa-R Inhibitors]      Muscle ache, joint pain       DIET  Orders Placed This Encounter   Procedures   • Diet Order Diet: Cardiac; Miscellaneous modifications: (optional): No Decaf, No Caffeine(for test)     Standing Status:   Standing     Number of Occurrences:   1     Order Specific Question:   Diet:     Answer:   Cardiac [6]     Order Specific Question:   Miscellaneous modifications: (optional)     Answer:   No Decaf, No Caffeine(for test) [11]       ACTIVITY  As tolerated.  Weight bearing as tolerated    LINES, DRAINS, AND WOUNDS  This is an automated list. Peripheral " IVs will be removed prior to discharge.  Peripheral IV 10/27/21 20 G Anterior;Distal;Right Wrist (Active)   Site Assessment Clean;Dry;Intact 11/02/21 0904   Dressing Type Transparent 11/02/21 0904   Line Status Scrubbed the hub prior to access;Flushed;No blood return;Saline locked 11/02/21 0904   Dressing Status Clean;Dry;Intact 11/02/21 0904   Dressing Intervention N/A 11/02/21 0904   Dressing Change Due 10/30/21 10/29/21 0100   Date Primary Tubing Changed 10/30/21 10/30/21 2030   Date Secondary Tubing Changed 10/30/21 10/30/21 2030   NEXT Primary Tubing Change  11/02/21 10/30/21 2030   NEXT Secondary Tubing Change  10/31/21 10/30/21 2030   Infiltration Grading ([facility], [facility]) 0 11/02/21 0904   Phlebitis Scale ([facility] Only) 0 11/02/21 0904     Urethral Catheter 16 Fr. (Active)   Site Assessment Clean;Skin intact 11/02/21 0900   Collection Container Leg bag 11/02/21 0900   Urinary Catheter Care Drainage Tube Extended 11/02/21 0900   Securement Method Securing device (Describe) 11/02/21 0900   Output (mL) 1600 mL 10/31/21 1800      Wound 07/29/21 Incision Chest Left Pace Maker Insertion - L Subclavian Area - Gauze / Tegaderm / Sling (Active)       Peripheral IV 10/27/21 20 G Anterior;Distal;Right Wrist (Active)   Site Assessment Clean;Dry;Intact 11/02/21 0904   Dressing Type Transparent 11/02/21 0904   Line Status Scrubbed the hub prior to access;Flushed;No blood return;Saline locked 11/02/21 0904   Dressing Status Clean;Dry;Intact 11/02/21 0904   Dressing Intervention N/A 11/02/21 0904   Dressing Change Due 10/30/21 10/29/21 0100   Date Primary Tubing Changed 10/30/21 10/30/21 2030   Date Secondary Tubing Changed 10/30/21 10/30/21 2030   NEXT Primary Tubing Change  11/02/21 10/30/21 2030   NEXT Secondary Tubing Change  10/31/21 10/30/21 2030   Infiltration Grading ([facility], [facility]) 0 11/02/21 0904   Phlebitis Scale ([facility] Only) 0 11/02/21 0904           Urethral Catheter 16 Fr. (Active)   Site Assessment Clean;Skin  intact 11/02/21 0900   Collection Container Leg bag 11/02/21 0900   Urinary Catheter Care Drainage Tube Extended 11/02/21 0900   Securement Method Securing device (Describe) 11/02/21 0900   Output (mL) 1600 mL 10/31/21 1800        MENTAL STATUS ON TRANSFER     Alert and Oriented to time,place, and person       CONSULTATIONS  Cardiology  ID     PROCEDURES  PAMELA    LABORATORY  Lab Results   Component Value Date    SODIUM 140 11/02/2021    POTASSIUM 3.7 11/02/2021    CHLORIDE 104 11/02/2021    CO2 26 11/02/2021    GLUCOSE 184 (H) 11/02/2021    BUN 10 11/02/2021    CREATININE 0.55 11/02/2021        Lab Results   Component Value Date    WBC 7.4 11/02/2021    HEMOGLOBIN 14.3 11/02/2021    HEMATOCRIT 43.9 11/02/2021    PLATELETCT 270 11/02/2021        Total time of the discharge process exceeds 35 minutes.

## 2021-11-02 NOTE — CARE PLAN
Problem: Pain - Standard  Goal: Alleviation of pain or a reduction in pain to the patient’s comfort goal  Outcome: Progressing     Problem: Fall Risk  Goal: Patient will remain free from falls  Outcome: Progressing  Note:   Non skid socks, fall band on, hourly rounding in place, call light within reach, path free of clutter. Pt calls appropriately. Education provided on need to call staff for assistance with mobility and pt states understanding.    The patient is Stable - Low risk of patient condition declining or worsening    Shift Goals  Clinical Goals: Manage pain and free of falls  Patient Goals: Sleep and rest  Family Goals: No family present    Progress made toward(s) clinical / shift goals:  MRI, pain control  Problem: Pain - Standard  Goal: Alleviation of pain or a reduction in pain to the patient’s comfort goal  Outcome: Progressing     Problem: Fall Risk  Goal: Patient will remain free from falls  Outcome: Progressing  Note:   Non skid socks, fall band on, hourly rounding in place, call light within reach, path free of clutter. Pt calls appropriately. Education provided on need to call staff for assistance with mobility and pt states understanding.        Patient is not progressing towards the following goals:

## 2021-11-02 NOTE — PROGRESS NOTES
Telemetry Shift Summary     Rhythm: SR with BBB  HR: 68-83  Ectopy: r-o PVC, r PAC, o Trig    Measurements: 0.20/0.12/0.38    Normal Values  Rhythm: SR  HR:   Measurements: 0.12-0.20/0.08-0.10/0.30-0.52

## 2021-11-02 NOTE — PROGRESS NOTES
Pt back from MRI, Pt resting comfortably in bed, call light with in reach of pt, tele monitor attached.  Bed alarm on, bed in low position.

## 2021-11-02 NOTE — DISCHARGE PLANNING
"Anticipated Discharge Disposition: Home, possible transfer to [facility]    Action: LSW received VM from pt's dtr Charla Schulte. Charla requested this LSW call [phone].     LSW called [facility]  at [id number] and provided the number listed above.  was able to connect this LSW with Charla. LSW spoke with Charla.    Per Charla, pt has history of being a \"con man\", is a poor decision maker, self medicates with opiates, and has lost all of his (and spouse's) money. Per Charla, pt's mental status has declined over the last 12 months. Per Charla, a week before pt was admitted, pt was moving out of the house (listed on face sheet) to an unknown address. Per Charla, her mom cannot afford to stay in the house by herself. Per Charla, they (her and her other siblings) are trying to find another place for her mom.     Charla states her primary concern is the safety of pt and her mom. LSW explained that pt is documented to be oriented and PT/OT cleared pt to return home with HH. Charla expressed understanding.     Barriers to Discharge: possible infusion order    Plan: LSW to discuss pt in IDT rounds. LSW to assist with transfer.    1030: Discussed pt in IDT rounds. Per MD, pt will be transferring to [facility].    1140: LSW faxed PCS form to transfer center.    1235: LSW received VM from ALLEN Ahn . LSW called Kayy back at [phone] and left VM with call back number.    1428: Per transfer center JOSLYN Pedersen, still waiting on bed at [facility].      1500: LSW completed transfer packet. LSW met with pt at bedside. Pt signed consent to transfer. LSW placed transfer packet with chart. LSW notified JOSLYN Greene.     "

## 2021-11-02 NOTE — PROGRESS NOTES
Per judd Sarmiento to take pt off tele-monitoring to transport to MRI.    One time dose of Ativan given via PIV prior to MRI.     [id number] pt transported to MRI

## 2021-11-02 NOTE — PROGRESS NOTES
[facility]PITAL[facility] TRIAGE OFFICER DIRECT ADMISSION REPORT  Transferring facility: [facility]  Transferring physician: Dr. lAston  Transferring facility/physician contact number: [phone]  Chief complaint: chest pain, SOB, weakness and dizziness    Pertinent history & patient course:   71-year-old M with DM2, DLP, HFrEF s/p AICD, A. fib on anticoagulation and BPH presented with above complains on 10/24 to [facility]. Initially managed for possible CVA but workups including MRI were neg for acute intracranial abnormalities. BCx grew MSSA. ID consulted; Pt underwent PAMELA which showed possible mobile mass in RA vs attached to ICD wire. Cardiology was consulted and recommended removal of AICD at [facility].    Pertinent imaging & lab results: See above  MRI of CS, TS and LS spine in progress (question about possible seeding)    Code Status: FULL per transferring provider, I personally verified with the transferring provider patient's code status and the transferring provider has confirmed this with the patient.  Further work up or recommendations per triage officer prior to transfer: N/A  Consultants called prior to transfer and pertinent input from consultants: Cardiology and ID following   Patient accepted for transfer: Yes  Consultants to be called upon arrival: Would notify cardiology about arrival  Admission status: Inpatient.   Floor requested: Med/surg OK  If ICU transfer, name of intensivist case discussed with and pertinent input from critical care: N/A  Please inform the triage officer upon arrival of the patient to [facility] for assignment of a hospitalist to perform admission.     For any question or concerns regarding the care of this patient, please reach out to the assigned hospitalist.

## 2021-11-02 NOTE — PROGRESS NOTES
Telemetry Shift Summary    Rhythm SR with BBB  HR Range 68-78  Ectopy oPVC, rPAC, Trip. Trig  Measurements  0.20/0.12/0.44        Normal Values  Rhythm SR  HR Range    Measurements 0.12-0.20 / 0.06-0.10  / 0.30-0.52

## 2021-11-02 NOTE — PROGRESS NOTES
"Infectious Disease Progress Note    Author: Randy Becerra M.D. Date & Time of service: 2021  6:22 AM    Chief Complaint:  MSSA sepsis    Interval History:  71 y.o. diabetic male admitted 10/24/2021 for weakness, dizziness, palpitations.+ AICD, Afib on apixaban, BPH, CVA found to have new CVA and above  10/26 AF WBC more alert today-still has LE weakness and can't walk  10/27 AF WBC 4.9 repeat blood cxs +staph No new complaints ECHO not done yet  10/28 AF sleeping soundly at time of rounds No echo yet  10/29 AF WBC 4.7 c/o back pain today-not controlled with meds  10/30 AF WBC not done plan for Echo today. Episode chest pain note, pleuritic. EKG no ischemia  10/31 AF WBC  8.2 TTE with thrombus RA-PAMELA pending Urinary retention noted-new Andres  Attributes retention to \"they haven't been giving me my pee pills\"   patient remains afebrile, white count is 7.4, tolerating Ancef thus far.    Labs Reviewed, Medications Reviewed and Radiology Reviewed.    Review of Systems:  Review of Systems   Constitutional: Negative for fever.   Respiratory: Negative for cough and shortness of breath.    Gastrointestinal: Negative for nausea and vomiting.   Genitourinary:        Urinary retention   Musculoskeletal: Positive for back pain.   Skin: Negative for rash.   Neurological: Positive for focal weakness.   All other systems reviewed and are negative.      Hemodynamics:  Temp (24hrs), Av.9 °C (98.5 °F), Min:36.8 °C (98.2 °F), Max:37 °C (98.6 °F)  Temperature: 36.8 °C (98.2 °F)  Pulse  Av.3  Min: 59  Max: 103   Blood Pressure : 148/73       Physical Exam:  Physical Exam  Vitals and nursing note reviewed.   Constitutional:       General: He is not in acute distress.     Appearance: He is not ill-appearing, toxic-appearing or diaphoretic.   HENT:      Nose: No rhinorrhea.      Mouth/Throat:      Pharynx: No oropharyngeal exudate.   Eyes:      General: No scleral icterus.     Pupils: Pupils are equal, round, and " reactive to light.   Cardiovascular:      Rate and Rhythm: Normal rate. Rhythm irregular.      Heart sounds: Murmur heard.        Comments: AICD nontender, well-healed incision  Pulmonary:      Effort: Pulmonary effort is normal. No respiratory distress.      Breath sounds: No stridor.   Abdominal:      General: There is no distension.      Palpations: Abdomen is soft.      Tenderness: There is no abdominal tenderness. There is no guarding.   Genitourinary:     Comments: Andres  Musculoskeletal:      Cervical back: Neck supple. No tenderness.      Right lower leg: No edema.      Left lower leg: No edema.   Skin:     Coloration: Skin is not jaundiced.   Neurological:      General: No focal deficit present.      Mental Status: He is alert and oriented to person, place, and time.      Sensory: No sensory deficit.         Meds:    Current Facility-Administered Medications:   •  potassium chloride SA  •  lidocaine  •  hyoscyamine-lidocaine-Maalox (GI Cocktail)  •  aspirin EC  •  tamsulosin  •  Pharmacy Consult Request  •  [DISCONTINUED] oxyCODONE immediate-release **OR** oxyCODONE immediate-release **OR** HYDROmorphone  •  guaiFENesin  •  acetaminophen  •  ceFAZolin  •  senna-docusate **AND** polyethylene glycol/lytes **AND** magnesium hydroxide **AND** bisacodyl  •  enalaprilat  •  labetalol  •  insulin regular **AND** POC blood glucose manual result **AND** NOTIFY MD and PharmD **AND** glucose **AND** dextrose 50%  •  apixaban  •  digoxin  •  finasteride  •  metoprolol SR  •  insulin glargine  •  melatonin  •  ondansetron  •  meclizine  •  LORazepam    Labs:  Recent Labs     10/31/21  0331 11/01/21  0520 11/02/21  0457   WBC 8.2 8.0 7.4   RBC 4.44* 4.09* 3.96*   HEMOGLOBIN 15.9 14.8 14.3   HEMATOCRIT 48.3 43.8 43.9   .8* 107.1* 110.9*   MCH 35.8* 36.2* 36.1*   RDW 63.6* 61.4* 61.8*   PLATELETCT 217 254 270   MPV 11.1 11.1 11.0   NEUTSPOLYS  --   --  79.40*   LYMPHOCYTES  --   --  6.40*   MONOCYTES  --   --  10.40    EOSINOPHILS  --   --  1.50   BASOPHILS  --   --  0.90     Recent Labs     10/31/21  0331 11/01/21  0520   SODIUM 138 137   POTASSIUM 3.6 3.2*   CHLORIDE 105 102   CO2 19* 24   GLUCOSE 227* 154*   BUN 10 8     Recent Labs     10/31/21  0331 11/01/21  0520   CREATININE 0.69 0.59       Imaging:  CT-CTA HEAD WITH & W/O-POST PROCESS    Result Date: 10/25/2021  10/24/2021 9:03 PM HISTORY/REASON FOR EXAM:  Dizziness, dehydration or hypotension; vertigo new onset, vasculopath TECHNIQUE/EXAM DESCRIPTION: CT angiogram of the [place] without and with contrast.  Initial precontrast images were obtained of the head from the skull base through the vertex.  Postcontrast images were obtained of the [place] following the power injection of nonionic contrast at 5.0 mL/sec. Thin-section helical images were obtained with overlapping reconstruction interval. Coronal and sagittal multiplanar volume reformats were generated.  3D angiographic images were reviewed on PACS.  Maximum intensity projection (MIP) images were generated and reviewed. 80 mL of Omnipaque 350 nonionic contrast was injected intravenously. Low dose optimization technique was utilized for this CT exam including automated exposure control and adjustment of the mA and/or kV according to patient size. COMPARISON:  August 9, 2018. FINDINGS: The posterior circulation shows the distal vertebral arteries to be patent. Hypoplastic distal left vertebral artery is seen. Atherosclerotic changes in the distal right vertebral artery are seen, resulting in less than 50% stenosis The vertebrobasilar confluence is intact. The basilar artery is patent. Right proximal P2 occlusion is seen which reconstitutes distally. Persistent fetal morphology of the left posterior cerebral artery is seen. Atherosclerotic changes are seen of the bilateral intracranial internal carotid arteries resulting in less than 50% stenosis, otherwise the anterior circulation shows no stenotic  or occlusive lesion. No aneurysm is evident about the [place]. Mild diffuse parenchymal volume loss is seen. There is mild periventricular and subcortical white matter low-attenuation changes. Low-density changes in the right parietal occipital lobe are seen compatible with encephalomalacia. 3D angiographic/MIP images of the vasculature confirm the vascular findings as described above.     1.  Right proximal P2 occlusion, reconstitutes distally 2.  Changes of atrophy with nonspecific white matter changes, most commonly associated with small vessel ischemia. These findings were discussed with the patient's clinician, Dr. Farooq, on 10/24/2021 10:12 PM.    CT-CTA NECK WITH & W/O-POST PROCESSING    Result Date: 10/24/2021    10/24/2021 9:03 PM HISTORY/REASON FOR EXAM:  Vertigo new onset, vasculopath TECHNIQUE/EXAM DESCRIPTION:  CT angiogram of the neck with contrast. Postcontrast images were obtained of the neck from the great vessels through the skull base following the power injection of nonionic contrast at 5.0 mL/sec. Thin-section helical images were obtained with overlapping reconstruction interval. Coronal and oblique multiplanar volume reformats were generated. Cervical internal carotid artery percent stenosis is calculated using the standard method according to the NASCET criteria wherein a segment of uniform caliber mid or distal cervical internal carotid is used as the reference denominator. 3D angiographic images were reviewed on PACS.  Maximum intensity projection (MIP) images were generated and reviewed 80 mL of Omnipaque 350 nonionic contrast was injected intravenously. Low dose optimization technique was utilized for this CT exam including automated exposure control and adjustment of the mA and/or kV according to patient size. COMPARISON: August 9, 2018 FINDINGS: 1.4 cm peripherally calcified left thyroid cyst is seen. Aortic arch: bovine type branching pattern. There is atherosclerotic plaque of  "the aorta. Right common carotid artery: Patent Right internal carotid artery: Atherosclerotic plaque without significant stenosis (less than 50%). Left common carotid artery is patent. Left internal carotid artery: Atherosclerotic plaque without significant stenosis (less than 50%). The right vertebral artery is patent without dissection or stenosis. The left vertebral artery is patent without dissection or stenosis. Vertebrobasilar confluence: The vertebrobasilar confluence appears normal. Lung apices are clear The soft tissues of the neck are within normal limits. 3D angiographic/MIP images of the vasculature confirm the vascular findings as described above.     1.  No high-grade stenosis, occlusion, or aneurysm appreciated.     DX-CHEST-PORTABLE (1 VIEW)    Result Date: 10/24/2021  10/24/2021 5:30 PM HISTORY/REASON FOR EXAM:  Chest Pain TECHNIQUE/EXAM DESCRIPTION AND NUMBER OF VIEWS: Single portable view of the chest. COMPARISON:  7/29/2021, 5/28/2021 FINDINGS: Left bipolar transvenous pacing device remains in place. The cardiac silhouette is within normal limits. No infiltrates or consolidations are noted. No pleural effusion is noted.     No acute cardiac or pulmonary abnormality is noted.      Micro:  Results     Procedure Component Value Units Date/Time    BLOOD CULTURE [[id number]] Collected: 10/28/21 0157    Order Status: Completed Specimen: Blood from Peripheral Updated: 11/02/21 0417     Significant Indicator NEG     Source BLD     Site PERIPHERAL     Culture Result No growth after 5 days of incubation.  Blood culture testing and Gram stain, if indicated, are  performed at [facility], [address] [place] [address].  Positive blood cultures are  sent to [facility], [address] [place] [address], for organism identification and  susceptibility testing.      Narrative:      Per Hospital Policy: Only change Specimen Src: to \"Line\" if  specified by physician " "order.  Right hand    BLOOD CULTURE [[id number]] Collected: 10/28/21 0157    Order Status: Completed Specimen: Blood from Peripheral Updated: 11/02/21 0417     Significant Indicator NEG     Source BLD     Site PERIPHERAL     Culture Result No growth after 5 days of incubation.  Blood culture testing and Gram stain, if indicated, are  performed at [facility], [address] [place] [address].  Positive blood cultures are  sent to [facility], [address] [place] [address], for organism identification and  susceptibility testing.      Narrative:      Per Hospital Policy: Only change Specimen Src: to \"Line\" if  specified by physician order.  Left wriast    BLOOD CULTURE [[id number]] Collected: 10/30/21 0109    Order Status: Completed Specimen: Blood from Peripheral Updated: 10/31/21 0757     Significant Indicator NEG     Source BLD     Site PERIPHERAL     Culture Result No Growth  Note: Blood cultures are incubated for 5 days and  are monitored continuously.Positive blood cultures  are called to the RN and reported as soon as  they are identified.  Blood culture testing and Gram stain, if indicated, are  performed at [facility], [address] [place] [address].  Positive blood cultures are  sent to [facility], [address] [place] [address], for organism identification and  susceptibility testing.      Narrative:      Per Hospital Policy: Only change Specimen Src: to \"Line\" if  specified by physician order.  No site indicated    BLOOD CULTURE [[id number]] Collected: 10/30/21 0109    Order Status: Completed Specimen: Blood from Peripheral Updated: 10/31/21 0757     Significant Indicator NEG     Source BLD     Site PERIPHERAL     Culture Result No Growth  Note: Blood cultures are incubated for 5 days and  are monitored continuously.Positive blood cultures  are called to the RN and reported as soon as  they are " "identified.  Blood culture testing and Gram stain, if indicated, are  performed at [facility], [address] [place] [address].  Positive blood cultures are  sent to [facility], [address] [place] [address], for organism identification and  susceptibility testing.      Narrative:      Per Hospital Policy: Only change Specimen Src: to \"Line\" if  specified by physician order.  No site indicated    BLOOD CULTURE [[id number]]  (Abnormal) Collected: 10/26/21 0807    Order Status: Completed Specimen: Blood from Peripheral Updated: 10/28/21 1113     Significant Indicator POS     Source BLD     Site PERIPHERAL     Culture Result Growth detected by Bactec instrument. 10/27/2021  05:08      Staphylococcus aureus  See previous culture for sensitivity report.      Narrative:      CALL  Russell  [facility] tel. [phone],  CALLED  [facility] tel. [phone] 10/27/2021, 05:09, RB PERF. RESULTS CALLED  TO:[id number], RN  Per Hospital Policy: Only change Specimen Src: to \"Line\" if  specified by physician order.  Right Hand    BLOOD CULTURE [[id number]]  (Abnormal) Collected: 10/26/21 0807    Order Status: Completed Specimen: Blood from Peripheral Updated: 10/28/21 1109     Significant Indicator POS     Source BLD     Site PERIPHERAL     Culture Result Growth detected by Bactec instrument. 10/27/2021  08:43      Staphylococcus aureus  See previous culture for sensitivity report.      Narrative:      Per Hospital Policy: Only change Specimen Src: to \"Line\" if  specified by physician order.  Right Wrist    BLOOD CULTURE [[id number]]  (Abnormal) Collected: 10/24/21 1840    Order Status: Completed Specimen: Blood from Peripheral Updated: 10/27/21 1119     Significant Indicator POS     Source BLD     Site PERIPHERAL     Culture Result Growth detected by Bactec instrument. 10/25/2021  08:14  Blood culture testing and Gram stain, if indicated, are  performed at [facility], " "[address]  [place], [place].  Positive blood cultures are  sent to [facility], [address] [place] [address], for organism identification and  susceptibility testing.        Staphylococcus aureus  See previous culture for sensitivity report.      Narrative:      2 of 2 blood culture x2  Sites order. Per Hospital Policy:  Only change Specimen Src: to \"Line\" if specified by physician  order.  Left AC    BLOOD CULTURE [[id number]]  (Abnormal)  (Susceptibility) Collected: 10/24/21 1843    Order Status: Completed Specimen: Blood from Peripheral Updated: 10/27/21 1119     Significant Indicator POS     Source BLD     Site PERIPHERAL     Culture Result Growth detected by Bactec instrument. 10/25/2021  07:57  Staphylococcus aureus (methicillin sensitive)  detected by PCR.        Staphylococcus aureus    Narrative:      CALL  Russell  [facility] tel. [phone],  CALLED  [facility] tel. [phone] 10/25/2021, 13:01, RB PERF. RESULTS CALLED  TO:Michael pharm  1 of 2 for Blood Culture x 2 sites order. Per Hospital  Policy: Only change Specimen Src: to \"Line\" if specified by  physician order.  Left AC    Susceptibility     Staphylococcus aureus (1)     Antibiotic Interpretation Microscan Method Status    Azithromycin Sensitive <=2 mcg/mL JAVI Final    Clindamycin Sensitive <=0.25 mcg/mL JAVI Final    Cefazolin Sensitive <=8 mcg/mL JAVI Final    Cefepime Sensitive <=4 mcg/mL JAVI Final    Ceftaroline Sensitive <=0.5 mcg/mL JAVI Final    Daptomycin Sensitive <=0.5 mcg/mL JAVI Final    Ampicillin/sulbactam Sensitive <=8/4 mcg/mL JAVI Final    Erythromycin Sensitive <=0.25 mcg/mL JAVI Final    Vancomycin Sensitive 1 mcg/mL JAVI Final    Oxacillin Sensitive 1 mcg/mL JAVI Final    Pip/Tazobactam Sensitive <=8 mcg/mL JAVI Final    Trimeth/Sulfa Sensitive <=0.5/9.5 mcg/mL JAVI Final    Tetracycline Sensitive <=4 mcg/mL JAVI Final                         Assessment:/Plan:  MSSA bacteremia  AICD in place  BCxs + MSSA 10/24 and 10/26  Repeat " Bcxs 10/28 and 10/30 neg  TTE with large right atrial mass  PAMELA official report pending  Plan is for explantation of AICD after transfer to [facility] IV cefazolin 2 g every 8 hours  Anticipate 6 weeks IV abx from date of ICD removal if no additional positive blood cultures.  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    ID will follow.  Please call with questions.

## 2021-11-02 NOTE — CARE PLAN
The patient is Watcher - Medium risk of patient condition declining or worsening    Shift Goals  Clinical Goals: Manage pain and free of falls  Patient Goals: Sleep and rest  Family Goals: No family present    Progress made toward(s) clinical / shift goals:  yes    Patient is not progressing towards the following goals:

## 2021-11-03 ENCOUNTER — APPOINTMENT (OUTPATIENT)
Dept: CARDIOLOGY | Facility: MEDICAL CENTER | Age: 71
DRG: 261 | End: 2021-11-03
Attending: INTERNAL MEDICINE
Payer: MEDICARE

## 2021-11-03 LAB
GLUCOSE BLD-MCNC: 216 MG/DL (ref 65–99)
GLUCOSE BLD-MCNC: 232 MG/DL (ref 65–99)
GLUCOSE BLD-MCNC: 238 MG/DL (ref 65–99)
GLUCOSE BLD-MCNC: 254 MG/DL (ref 65–99)
SARS-COV+SARS-COV-2 AG RESP QL IA.RAPID: NOTDETECTED
SPECIMEN SOURCE: NORMAL

## 2021-11-03 PROCEDURE — 700102 HCHG RX REV CODE 250 W/ 637 OVERRIDE(OP): Performed by: INTERNAL MEDICINE

## 2021-11-03 PROCEDURE — 770020 HCHG ROOM/CARE - TELE (206)

## 2021-11-03 PROCEDURE — 82962 GLUCOSE BLOOD TEST: CPT

## 2021-11-03 PROCEDURE — 99233 SBSQ HOSP IP/OBS HIGH 50: CPT | Performed by: INTERNAL MEDICINE

## 2021-11-03 PROCEDURE — 700111 HCHG RX REV CODE 636 W/ 250 OVERRIDE (IP): Performed by: INTERNAL MEDICINE

## 2021-11-03 PROCEDURE — 93312 ECHO TRANSESOPHAGEAL: CPT | Mod: 26 | Performed by: INTERNAL MEDICINE

## 2021-11-03 PROCEDURE — 51798 US URINE CAPACITY MEASURE: CPT

## 2021-11-03 PROCEDURE — 99221 1ST HOSP IP/OBS SF/LOW 40: CPT | Mod: 57 | Performed by: INTERNAL MEDICINE

## 2021-11-03 PROCEDURE — 87426 SARSCOV CORONAVIRUS AG IA: CPT

## 2021-11-03 PROCEDURE — A9270 NON-COVERED ITEM OR SERVICE: HCPCS | Performed by: INTERNAL MEDICINE

## 2021-11-03 RX ADMIN — OXYCODONE 5 MG: 5 TABLET ORAL at 00:03

## 2021-11-03 RX ADMIN — LORAZEPAM 0.5 MG: 2 INJECTION INTRAMUSCULAR; INTRAVENOUS at 21:34

## 2021-11-03 RX ADMIN — Medication 5 MG: at 00:03

## 2021-11-03 RX ADMIN — INSULIN HUMAN 3 UNITS: 100 INJECTION, SOLUTION PARENTERAL at 21:42

## 2021-11-03 RX ADMIN — CEFAZOLIN SODIUM 2 G: 2 INJECTION, SOLUTION INTRAVENOUS at 21:34

## 2021-11-03 RX ADMIN — DIGOXIN 125 MCG: 125 TABLET ORAL at 17:20

## 2021-11-03 RX ADMIN — CEFAZOLIN SODIUM 2 G: 2 INJECTION, SOLUTION INTRAVENOUS at 04:57

## 2021-11-03 RX ADMIN — OXYCODONE 5 MG: 5 TABLET ORAL at 05:33

## 2021-11-03 RX ADMIN — METOPROLOL SUCCINATE 25 MG: 25 TABLET, EXTENDED RELEASE ORAL at 05:21

## 2021-11-03 RX ADMIN — CEFAZOLIN SODIUM 2 G: 2 INJECTION, SOLUTION INTRAVENOUS at 14:26

## 2021-11-03 RX ADMIN — GUAIFENESIN 200 MG: 100 SOLUTION ORAL at 17:28

## 2021-11-03 RX ADMIN — GUAIFENESIN 200 MG: 100 SOLUTION ORAL at 21:34

## 2021-11-03 RX ADMIN — Medication 5 MG: at 21:34

## 2021-11-03 RX ADMIN — FINASTERIDE 5 MG: 5 TABLET, FILM COATED ORAL at 05:20

## 2021-11-03 RX ADMIN — GUAIFENESIN 200 MG: 100 SOLUTION ORAL at 11:14

## 2021-11-03 RX ADMIN — TAMSULOSIN HYDROCHLORIDE 0.4 MG: 0.4 CAPSULE ORAL at 08:32

## 2021-11-03 RX ADMIN — OXYCODONE 5 MG: 5 TABLET ORAL at 15:26

## 2021-11-03 RX ADMIN — GUAIFENESIN 200 MG: 100 SOLUTION ORAL at 05:32

## 2021-11-03 RX ADMIN — INSULIN GLARGINE 6 UNITS: 100 INJECTION, SOLUTION SUBCUTANEOUS at 05:22

## 2021-11-03 RX ADMIN — OXYCODONE 5 MG: 5 TABLET ORAL at 21:34

## 2021-11-03 RX ADMIN — LIDOCAINE HYDROCHLORIDE 15 ML: 20 SOLUTION OROPHARYNGEAL at 21:34

## 2021-11-03 RX ADMIN — OXYCODONE 5 MG: 5 TABLET ORAL at 11:15

## 2021-11-03 RX ADMIN — INSULIN HUMAN 5 UNITS: 100 INJECTION, SOLUTION PARENTERAL at 17:25

## 2021-11-03 ASSESSMENT — ENCOUNTER SYMPTOMS
DIZZINESS: 0
NAUSEA: 0
WEAKNESS: 1
COUGH: 1
MYALGIAS: 1
SHORTNESS OF BREATH: 1
ABDOMINAL PAIN: 1

## 2021-11-03 ASSESSMENT — PAIN DESCRIPTION - PAIN TYPE
TYPE: ACUTE PAIN;CHRONIC PAIN
TYPE: ACUTE PAIN;CHRONIC PAIN
TYPE: ACUTE PAIN
TYPE: ACUTE PAIN;CHRONIC PAIN
TYPE: ACUTE PAIN
TYPE: ACUTE PAIN;CHRONIC PAIN
TYPE: ACUTE PAIN;CHRONIC PAIN
TYPE: ACUTE PAIN

## 2021-11-03 NOTE — PROGRESS NOTES
Telemetry Shift Summary    Rhythm SR/SR w BBB  HR Range 59-70  Ectopy rPVC  Measurements 0.18/0.12/0.36        Normal Values  Rhythm SR  HR Range    Measurements 0.12-0.20 / 0.06-0.10  / 0.30-0.52

## 2021-11-03 NOTE — NON-PROVIDER
Medical Student Progress Note    Chief Complaint  Patient presents with   • Chest Pain       on and off for last few weeks   • Shortness of Breath       since he had covid in january but worsened today    • Weakness       worsened today    • Dizziness       started today          History of Presenting Illness    Bob Schulte is a 70 yo M with PMH significant for DM II, HFrEF s/p AICD, and a fib on AC. He presented to [facility] on 11/2/21 as a transfer from [facility] for endocarditis 2/2 AICD requiring AICD removal. He initially presented to [facility] on 10/24/21 with complaints of lower extremity weakness, neuro workup was negative. The weakness resolved spontaneously and patient states that he was able to walk 0.5 miles a day. He was incidentally found to MSSA bacterermia. PAMELA showed a mobile mass in the RA vs ICD wire at which time patient was transferred to [facility] for AICD removal.      Interval Problem Update  No overnight events. Today, patient is complaining of SOB, cough, and fatigue. He is also complaining of LUQ abdominal pain that is worse with inspiration. Patient scheduled for AICD removal today.       Consults/Specialty  ID, Cardiology     Code Status  Full code     Disposition  To be determined     Review of Systems   Constitutional: Positive for fatigue  HENT: Negative.    Eyes: Negative.   Respiratory:  Positive for cough and SOB  Cardiovascular: Negative for chest pain and palpitations. Negative for leg swelling.  Gastrointestinal: Negative for abdominal pain. Negative for constipation, diarrhea, nausea and vomiting.   Genitourinary: Positive for urinary urgency.  Musculoskeletal: Positive for generalized pain.  Skin: Negative.  Neurological: Negative..   All other systems reviewed and are negative.      Physical Exam:  Vitals:    11/03/21 1215   BP: 131/80   Pulse: 88   Resp: 20   Temp: 36.6 °C (97.8 °F)   SpO2: 95%       Physical Exam  Vitals signs and nursing note reviewed.    Constitutional:       Appearance: Normal appearance. He is well-developed and normal weight.   HENT:      Head: Normocephalic and atraumatic.    Eyes:      Conjunctiva/sclera: Conjunctivae normal.   Neck:      Musculoskeletal: Normal range of motion.     Vascular: No JVD.   Cardiovascular:      Rate and Rhythm: Normal rate and regular rhythm.      Pulses: Normal pulses.      Heart sounds: Normal heart sounds. No murmur.   Pulmonary:      Effort: Pulmonary effort is normal. No rales.   Chest:      Chest wall: No tenderness.   Abdominal:      General: Soft, non distended     Tenderness: Tenderness in LUQ  Musculoskeletal: Normal range of motion.         General: No tenderness, no edema, strength 4/5 in upper and lower extremities.   Skin:     General: Skin is warm and dry.      Capillary Refill: Capillary refill takes 2 seconds.    Neurological:      General: No focal deficit present.      Mental Status: He is alert and oriented to person, place, and time. Mental status is at baseline. .   Psychiatric:         Mood and Affect: Mood irritable         Behavior: Behavior non cooperative at times.        Laboratory:  Recent Labs     11/01/21 0520 11/02/21 0457   WBC 8.0 7.4   RBC 4.09* 3.96*   HEMOGLOBIN 14.8 14.3   HEMATOCRIT 43.8 43.9   .1* 110.9*   MCH 36.2* 36.1*   RDW 61.4* 61.8*   PLATELETCT 254 270   MPV 11.1 11.0   NEUTSPOLYS  --  79.40*   LYMPHOCYTES  --  6.40*   MONOCYTES  --  10.40   EOSINOPHILS  --  1.50   BASOPHILS  --  0.90     Recent Labs     11/01/21  0520 11/02/21 0457   SODIUM 137 140   POTASSIUM 3.2* 3.7   CHLORIDE 102 104   CO2 24 26   GLUCOSE 154* 184*   BUN 8 10     Recent Labs     11/02/21 0457   ASTSGOT 12   ALTSGPT 6   TBILIRUBIN 0.7   ALKPHOSPHAT 78   GLOBULIN 4.0*        Assessment and Plan:  Acute bacterial endocarditis  Assessment & Plan  Positive BC with MSSA 10/24 and 10/26  Repeat BC on 10/28 and 10/30 negative  PAMELA shows mobile mass in RA likely endocarditis 2/2 AICD  AICD to  be removed today 11/3  Per ID consult continue IV cefazolin 2 g every 8 hours  Anticipate 6 weeks IV abx from date of ICD removal if no additional positive blood cultures.    Patient states he has done IV antibiotics at home before     Type 2 diabetes mellitus without complication, with long-term current use of insulin (HCC)- (present on admission)  Assessment & Plan  Continue Lantus with regular insulin sliding scale before every meal as needed     Chronic systolic heart failure (HCC)- (present on admission)  Assessment & Plan  Compensated, follow-up cardiology consult     Paroxysmal A-fib (HCC)- (present on admission)  Assessment & Plan  Currently in sinus rhythm, anticoagulation held for ICD removal

## 2021-11-03 NOTE — PROGRESS NOTES
REMSA at bedside. Report given. Pt awake, in stable condition. Belongings x2 bags given to patient. Pt to transfer to [facility] [id number].

## 2021-11-03 NOTE — PROGRESS NOTES
Hospital Medicine Daily Progress Note    Date of Service  11/3/2021     Chief Complaint  Francesco Schulte is a 71 y.o. male admitted 11/2/2021 with weakness    Hospital Course  70 yo man with DM, afib, BPH, CHF, AICD who admitted with dizziness and weakness concerning for stroke. MRI was negative and he was found with MSSA bacteremia. PAMELA showed mobile mass in right atrium. ID was consulted.    Interval Problem Update  He is planning ICD removal  He complains of ongoing SOB and cough, which is present since his COVID infection  He has left upper abd pain with deep breathing  He has generalized body aches. He says overall his weakness is improved since when he first got admitted    I have personally seen and examined the patient at bedside. I discussed the plan of care with patient, charge RN,  and pharmacy.    Consultants/Specialty  cardiology and infectious disease    Code Status  Full Code    Disposition  Patient is not medically cleared.   Anticipate discharge to to home with close outpatient follow-up.  I have placed the appropriate orders for post-discharge needs.    Review of Systems  Review of Systems   Constitutional: Positive for malaise/fatigue.   Respiratory: Positive for cough and shortness of breath.    Cardiovascular: Positive for chest pain.   Gastrointestinal: Positive for abdominal pain. Negative for nausea.   Musculoskeletal: Positive for myalgias.   Neurological: Positive for weakness. Negative for dizziness.   All other systems reviewed and are negative.       Physical Exam  Temp:  [36 °C (96.8 °F)-37.2 °C (98.9 °F)] 36.6 °C (97.9 °F)  Pulse:  [80-97] 89  Resp:  [17-20] 18  BP: (128-168)/() 129/78  SpO2:  [92 %-95 %] 92 %    Physical Exam  Vitals and nursing note reviewed.   Constitutional:       General: He is not in acute distress.     Appearance: He is not toxic-appearing.   HENT:      Head: Normocephalic.      Mouth/Throat:      Mouth: Mucous membranes are moist.    Eyes:      General:         Right eye: No discharge.         Left eye: No discharge.   Cardiovascular:      Rate and Rhythm: Normal rate and regular rhythm.   Pulmonary:      Effort: Pulmonary effort is normal. No respiratory distress.      Breath sounds: No wheezing or rales.   Chest:      Chest wall: No tenderness.   Abdominal:      General: There is no distension.      Palpations: Abdomen is soft. There is no mass.      Tenderness: There is abdominal tenderness (LLQ mild tenderness with deep palpation). There is no guarding or rebound.   Musculoskeletal:         General: No swelling.      Cervical back: Neck supple.   Skin:     General: Skin is warm and dry.   Neurological:      Mental Status: He is alert and oriented to person, place, and time.         Fluids  No intake or output data in the 24 hours ending 11/03/21 1638    Laboratory  Recent Labs     11/01/21  0520 11/02/21  0457   WBC 8.0 7.4   RBC 4.09* 3.96*   HEMOGLOBIN 14.8 14.3   HEMATOCRIT 43.8 43.9   .1* 110.9*   MCH 36.2* 36.1*   MCHC 33.8 32.6*   RDW 61.4* 61.8*   PLATELETCT 254 270   MPV 11.1 11.0     Recent Labs     11/01/21  0520 11/02/21  0457   SODIUM 137 140   POTASSIUM 3.2* 3.7   CHLORIDE 102 104   CO2 24 26   GLUCOSE 154* 184*   BUN 8 10   CREATININE 0.59 0.55   CALCIUM 8.5 8.6                   Imaging  CL-ICD,PM,BIV LEAD EXTRACTION W/ ANESTHESIA    (Results Pending)        Assessment/Plan  Acute bacterial endocarditis  Assessment & Plan  Per ID consult continue IV cefazolin 2 g every 8 hours  Anticipate 6 weeks IV abx from date of ICD removal if no additional positive blood cultures.    Patient states he has done IV antibiotics at home before    Type 2 diabetes mellitus without complication, with long-term current use of insulin (HCC)- (present on admission)  Assessment & Plan  Continue Lantus with regular insulin sliding scale before every meal as needed    Chronic systolic heart failure (HCC)- (present on admission)  Assessment &  Plan  Continue digoxin and metop  He was previously on aldactone and did not tolerate ACEI/ARB  Monitor for now, f/u cardiology recs    Paroxysmal A-fib (HCC)- (present on admission)  Assessment & Plan  Currently in sinus rhythm, anticoagulation held for ICD removal       VTE prophylaxis: SCDs/TEDs    I have performed a physical exam and reviewed and updated ROS and Plan today (11/3/2021). In review of yesterday's note (11/2/2021), there are no changes except as documented above.

## 2021-11-03 NOTE — CARE PLAN
The patient is Watcher - Medium risk of patient condition declining or worsening    Shift Goals  Clinical Goals: Pain management, remain free from falls, decrease sxs of infection, admin Abx, NPO for procedure, monitor and control BP and HR  Patient Goals: Comfort, rest, pain management  Family Goals: KAL. No family present.     Progress made toward(s) clinical / shift goals:      Problem: Knowledge Deficit - Standard  Goal: Patient and family/care givers will demonstrate understanding of plan of care, disease process/condition, diagnostic tests and medications  Outcome: Progressing     Problem: Pain - Standard  Goal: Alleviation of pain or a reduction in pain to the patient’s comfort goal  Outcome: Progressing       Patient is not progressing towards the following goals: NA

## 2021-11-03 NOTE — PROGRESS NOTES
A&ox4. Generalized weakness. Continent/Incontinent bm tonight. Repositioned, cleaned. zuri cream applied. Andres to dd with qs light pink/cloudy urine. On RA. Shortness of breath with exertion. +cough, nonproductive. Complaints of generalized pain. Medicated per MAR. Aware of transfer order to [facility] tonight. Verbalized understanding and agrees. Able to make needs known.

## 2021-11-03 NOTE — PROGRESS NOTES
Bedside report received and patient care assumed. Pt is resting in bed, A&Ox4, with no complaints of pain, and is on RA. Tele box on. Monitor room notified, RR confirmed, continuous cardiac monitoring initated and box linked.  All fall precautions are in place, belongings at bedside table.  Pt was updated on POC, no questions or concerns. Pt educated on use of call light for assistance.     Crisis Charting: COVID-19 surge in effect
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
## 2021-11-03 NOTE — PROGRESS NOTES
Direct admit from [facility] around 21:00 via [facility].  Awaiting orders for medical vs telemetry ; call out to dr vargas for clarification      Left unit to transfer to [facility] with RN x2 with belongings and chart with medication. Report given

## 2021-11-03 NOTE — PROGRESS NOTES
"Infectious Disease Progress Note    Author: Randy Becerra M.D. Date & Time of service: 11/3/2021  3:50 PM    Chief Complaint:  Follow-up for MSSA bacteremia    Interval History:  71 y.o. diabetic male admitted 10/24/2021 for weakness, dizziness, palpitations.+ AICD, Afib on apixaban, BPH, CVA found to have new CVA and above  10/26 AF WBC more alert today-still has LE weakness and can't walk  10/27 AF WBC 4.9 repeat blood cxs +staph No new complaints ECHO not done yet  10/28 AF sleeping soundly at time of rounds No echo yet  10/29 AF WBC 4.7 c/o back pain today-not controlled with meds  10/30 AF WBC not done plan for Echo today. Episode chest pain note, pleuritic. EKG no ischemia  10/31 AF WBC  8.2 TTE with thrombus RA-PAMELA pending Urinary retention noted-new Andres  Attributes retention to \"they haven't been giving me my pee pills\"   patient remains afebrile, white count is 7.4, tolerating Ancef thus far.  11/3 patient transferred to [facility] for ICD extraction, remains afebrile, no CBC this morning. Resting comfortably this morning.    Labs Reviewed and Medications Reviewed.    Review of Systems:  Review of Systems   Unable to perform ROS: Other     Hemodynamics:  Temp (24hrs), Av.5 °C (97.7 °F), Min:36 °C (96.8 °F), Max:37.1 °C (98.7 °F)  Temperature: 36.6 °C (97.8 °F)  Pulse  Av  Min: 87  Max: 97   Blood Pressure : 131/80       Physical Exam:  Physical Exam  Vitals and nursing note reviewed.   Constitutional:       General: He is not in acute distress.     Appearance: He is not toxic-appearing or diaphoretic.   HENT:      Head: Normocephalic and atraumatic.      Mouth/Throat:      Mouth: Mucous membranes are dry.      Pharynx: Oropharynx is clear.   Eyes:      General: No scleral icterus.        Right eye: No discharge.         Left eye: No discharge.      Conjunctiva/sclera: Conjunctivae normal.   Cardiovascular:      Rate and Rhythm: Normal rate. Rhythm irregular.   Pulmonary:      Effort: " Pulmonary effort is normal. No respiratory distress.      Comments: AICD site not tender, well-healed incision  Abdominal:      General: Bowel sounds are normal. There is no distension.      Tenderness: There is no abdominal tenderness.   Musculoskeletal:         General: No swelling or tenderness.   Skin:     Findings: No erythema or rash.   Neurological:      General: No focal deficit present.      Mental Status: He is alert and oriented to person, place, and time.   Psychiatric:         Mood and Affect: Mood normal.         Behavior: Behavior normal.         Meds:    Current Facility-Administered Medications:   •  insulin regular **AND** POC blood glucose manual result **AND** NOTIFY MD and PharmD **AND** glucose **AND** dextrose 50%  •  enalaprilat  •  labetalol  •  senna-docusate **AND** polyethylene glycol/lytes **AND** magnesium hydroxide **AND** bisacodyl  •  oxyCODONE immediate-release **OR** HYDROmorphone  •  Pharmacy Consult Request  •  acetaminophen  •  ceFAZolin  •  digoxin  •  finasteride  •  guaiFENesin  •  hyoscyamine-lidocaine-Maalox (GI Cocktail)  •  insulin glargine  •  lidocaine  •  LORazepam  •  meclizine  •  melatonin  •  metoprolol SR  •  ondansetron  •  tamsulosin    Labs:  Recent Labs     11/01/21 0520 11/02/21 0457   WBC 8.0 7.4   RBC 4.09* 3.96*   HEMOGLOBIN 14.8 14.3   HEMATOCRIT 43.8 43.9   .1* 110.9*   MCH 36.2* 36.1*   RDW 61.4* 61.8*   PLATELETCT 254 270   MPV 11.1 11.0   NEUTSPOLYS  --  79.40*   LYMPHOCYTES  --  6.40*   MONOCYTES  --  10.40   EOSINOPHILS  --  1.50   BASOPHILS  --  0.90     Recent Labs     11/01/21 0520 11/02/21 0457   SODIUM 137 140   POTASSIUM 3.2* 3.7   CHLORIDE 102 104   CO2 24 26   GLUCOSE 154* 184*   BUN 8 10     Recent Labs     11/01/21  0520 11/02/21 0457   ALBUMIN  --  2.6*   TBILIRUBIN  --  0.7   ALKPHOSPHAT  --  78   TOTPROTEIN  --  6.6   ALTSGPT  --  6   ASTSGOT  --  12   CREATININE 0.59 0.55       Imaging:  CT-CTA HEAD WITH & W/O-POST  PROCESS    Result Date: 10/25/2021  10/24/2021 9:03 PM HISTORY/REASON FOR EXAM:  Dizziness, dehydration or hypotension; vertigo new onset, vasculopath TECHNIQUE/EXAM DESCRIPTION: CT angiogram of the [place] without and with contrast.  Initial precontrast images were obtained of the head from the skull base through the vertex.  Postcontrast images were obtained of the [place] following the power injection of nonionic contrast at 5.0 mL/sec. Thin-section helical images were obtained with overlapping reconstruction interval. Coronal and sagittal multiplanar volume reformats were generated.  3D angiographic images were reviewed on PACS.  Maximum intensity projection (MIP) images were generated and reviewed. 80 mL of Omnipaque 350 nonionic contrast was injected intravenously. Low dose optimization technique was utilized for this CT exam including automated exposure control and adjustment of the mA and/or kV according to patient size. COMPARISON:  August 9, 2018. FINDINGS: The posterior circulation shows the distal vertebral arteries to be patent. Hypoplastic distal left vertebral artery is seen. Atherosclerotic changes in the distal right vertebral artery are seen, resulting in less than 50% stenosis The vertebrobasilar confluence is intact. The basilar artery is patent. Right proximal P2 occlusion is seen which reconstitutes distally. Persistent fetal morphology of the left posterior cerebral artery is seen. Atherosclerotic changes are seen of the bilateral intracranial internal carotid arteries resulting in less than 50% stenosis, otherwise the anterior circulation shows no stenotic or occlusive lesion. No aneurysm is evident about the [place]. Mild diffuse parenchymal volume loss is seen. There is mild periventricular and subcortical white matter low-attenuation changes. Low-density changes in the right parietal occipital lobe are seen compatible with encephalomalacia. 3D angiographic/MIP  images of the vasculature confirm the vascular findings as described above.     1.  Right proximal P2 occlusion, reconstitutes distally 2.  Changes of atrophy with nonspecific white matter changes, most commonly associated with small vessel ischemia. These findings were discussed with the patient's clinician, Dr. Farooq, on 10/24/2021 10:12 PM.    CT-CTA NECK WITH & W/O-POST PROCESSING    Result Date: 10/24/2021    10/24/2021 9:03 PM HISTORY/REASON FOR EXAM:  Vertigo new onset, vasculopath TECHNIQUE/EXAM DESCRIPTION:  CT angiogram of the neck with contrast. Postcontrast images were obtained of the neck from the great vessels through the skull base following the power injection of nonionic contrast at 5.0 mL/sec. Thin-section helical images were obtained with overlapping reconstruction interval. Coronal and oblique multiplanar volume reformats were generated. Cervical internal carotid artery percent stenosis is calculated using the standard method according to the NASCET criteria wherein a segment of uniform caliber mid or distal cervical internal carotid is used as the reference denominator. 3D angiographic images were reviewed on PACS.  Maximum intensity projection (MIP) images were generated and reviewed 80 mL of Omnipaque 350 nonionic contrast was injected intravenously. Low dose optimization technique was utilized for this CT exam including automated exposure control and adjustment of the mA and/or kV according to patient size. COMPARISON: August 9, 2018 FINDINGS: 1.4 cm peripherally calcified left thyroid cyst is seen. Aortic arch: bovine type branching pattern. There is atherosclerotic plaque of the aorta. Right common carotid artery: Patent Right internal carotid artery: Atherosclerotic plaque without significant stenosis (less than 50%). Left common carotid artery is patent. Left internal carotid artery: Atherosclerotic plaque without significant stenosis (less than 50%). The right vertebral artery is patent  without dissection or stenosis. The left vertebral artery is patent without dissection or stenosis. Vertebrobasilar confluence: The vertebrobasilar confluence appears normal. Lung apices are clear The soft tissues of the neck are within normal limits. 3D angiographic/MIP images of the vasculature confirm the vascular findings as described above.     1.  No high-grade stenosis, occlusion, or aneurysm appreciated.     DX-CHEST-PORTABLE (1 VIEW)    Result Date: 10/31/2021  10/31/2021 1:54 PM HISTORY/REASON FOR EXAM:  Chest Pain TECHNIQUE/EXAM DESCRIPTION AND NUMBER OF VIEWS: Single portable view of the chest. COMPARISON: 10/29/2021 FINDINGS: Pacemaker is unchanged in appearance. The mediastinal and cardiac silhouette is mildly enlarged. Central vessels are engorged. There is minimal lower lobe atelectasis. There is a trace of bilateral pleural fluid. There is no visible pneumothorax. There are no acute bony abnormalities.     1.  There are changes of vascular congestion/edema.    DX-CHEST-PORTABLE (1 VIEW)    Result Date: 10/29/2021  10/29/2021 5:03 PM HISTORY/REASON FOR EXAM:  Chest Pain. TECHNIQUE/EXAM DESCRIPTION AND NUMBER OF VIEWS: Single portable view of the chest. COMPARISON: October 24, 2021 FINDINGS: Cardiac silhouette is enlarged. Dual-lead left-sided pacemaker is present. There is bibasilar atelectasis with pleural fluid. There is mild interstitial prominence suggesting mild edema. No pneumothorax identified.     Enlarged cardiac silhouette and interstitial prominence of pleural fluid suggesting congestive heart failure.    DX-CHEST-PORTABLE (1 VIEW)    Result Date: 10/24/2021  10/24/2021 5:30 PM HISTORY/REASON FOR EXAM:  Chest Pain TECHNIQUE/EXAM DESCRIPTION AND NUMBER OF VIEWS: Single portable view of the chest. COMPARISON:  7/29/2021, 5/28/2021 FINDINGS: Left bipolar transvenous pacing device remains in place. The cardiac silhouette is within normal limits. No infiltrates or consolidations are noted. No  pleural effusion is noted.     No acute cardiac or pulmonary abnormality is noted.    IB-JSJCFRK-P/O    Result Date: 10/26/2021  10/26/2021 6:57 PM HISTORY/REASON FOR EXAM:  History of cholelithiasis. TECHNIQUE/EXAM DESCRIPTION: Magnetic resonance cholangiopancreatography. Magnetic resonance cholangiopancreatography was performed on with axial, coronal, and sagittal thin and thick section heavily T2-weighted sequences. 3-D cholangiographic multiplanar maximum intensity projection (MIP) images were created on a workstation.. The study was performed on a [employer] Signa 1.5 Antonina MRI scanner. COMPARISON: Ultrasound 5/30/2021 FINDINGS: Images are somewhat limited by technique. Gallbladder: Small dependent gallstones are again seen. There is no pericholecystic fluid or gallbladder wall thickening. Biliary tree: Normal in caliber without choledocholithiasis or obstructive mass. Pancreas: There is no evidence of a solid pancreatic mass. There is a questionable cystic area in the splenic hilum/pancreatic tail region measuring 2 x 2 centimeters. Pancreatic duct is normal in caliber and course. Liver: There are no focal signal abnormalities. Spleen: Enlarged measuring 16.4 cm in length. Adrenal glands: There are no nodules. Kidneys: There are bilateral simple renal cysts. There is no hydronephrosis. Nodes: There is no abdominal adenopathy. Ascites: None. There is minimal degenerative change in spine.     1.  There is cholelithiasis without pericholecystic, gallbladder wall thickening or biliary dilatation. 2.  There is no choledocholithiasis. 3.  There is a potential 2 cm cystic lesion in the pancreatic tail region/splenic hilum.    MR-BRAIN-W/O    Result Date: 10/26/2021  10/26/2021 6:41 PM HISTORY/REASON FOR EXAM:  Stroke, follow up. Altered mental status. Weakness. Possible sepsis, concern for stroke. TECHNIQUE/EXAM DESCRIPTION: MRI of the brain without contrast. T1 sagittal, T2 fast spin-echo axial, T1 coronal, FLAIR coronal,  Diffusion weighted and Apparent Diffusion Coefficient (ADC map) axial images were obtained of the whole brain. Additional FLAIR axial images were obtained. The study was performed on a [employer] Signa 1.5 Antonina MRI scanner. COMPARISON:  MRI brain 3/11/2018, head CT and CTA of the head 10/24/2021 FINDINGS: The calvariae are unremarkable.  There are no extra-axial fluid collections. There is a pattern of mild cerebral atrophy manifest as prominence of sulcal markings over the convexities and vertex along with mild ventriculomegaly. Again seen is a moderate-sized area of chronic cortical and subcortical encephalomalacic change in the right lateral occipital lobe extending up into the right posterior parietal lobe consistent with old infarction in the territory of the right PCA. Also  again seen are small foci of chronic encephalomalacia about the central sulcus involving the precentral gyrus in the frontal lobe and postcentral gyrus in the right parietal lobe (FLAIR axial images 22-25). These are consistent with old infarcts. No change from prior exam. There is also a small curvilinear area of encephalomalacia in the right anterior parasagittal frontal lobe extending into the right anterior frontal deep white matter. This may represent old infarction versus chronic sequela of posttraumatic brain contusion. This has become more conspicuous since the prior exam. There is some mildly bright diffusion signal but this likely represents T2 shine through as the lesion is well demarcated on the T2-weighted images. There is also a small area of chronic encephalomalacic change at the inferior surface of the left temporal lobe involving the inferior temporal gyrus. (FLAIR axial images 19-21, series a and FLAIR axial images 11, 12, series 7). This lesion was not present on the prior exam and the location is most suggestive of posttraumatic brain contusion. There are no mass effects or shift of midline structures.  There are no  hemorrhagic lesions.  The diffusion weighted axial images show no evidence of acute cerebral infarction. The brainstem and posterior fossa structures are unremarkable. Vascular flow voids in the vertebrobasilar and carotid arteries, [place], and dural venous sinuses are intact. The distal right vertebral artery is dominant. There is a diminutive vertebro-basilar system consistent with normal variation likely associated with carotid origin of one or both posterior cerebral arteries. The paranasal sinuses in the field of view are unremarkable. The mastoids are clear.     1.  Mild cerebral atrophy. 2.  Chronic encephalomalacic changes right lateral occipital lobe, right parietal lobe, right posterior frontal lobe most consistent with old cerebral infarcts. 3.  Small area of chronic encephalomalacic change at the right anterior parasagittal frontal lobe which has developed since the prior exam. This may represent an old infarction versus chronic sequela of posttraumatic brain contusion. 4.  Small area of chronic encephalomalacic change at the inferior surface of the left temporal lobe which was not present on the prior exam from 2018. Lesion location and morphology is most suggestive of chronic posttraumatic brain contusion. 5.  No evidence of acute infarction, acute hemorrhage, or mass lesion.    MR-CERVICAL SPINE-WITH & W/O    Result Date: 11/2/2021 11/1/2021 10:34 AM HISTORY/REASON FOR EXAM:  Neck pain, acute, infection suspected TECHNIQUE/EXAM DESCRIPTION: MRI of the cervical spine without and with contrast. The study was performed on a [employer] Signa 1.5 Antonina MRI scanner. T1 sagittal, T2 fast spin-echo sagittal, T1 postcontrast fat-suppressed sagittal, and gradient-echo axial images were obtained of the cervical spine. 18 mL ProHance contrast were administered  intravenously. COMPARISON:  CTA of the neck 10/24/2021 FINDINGS:  Intervertebral fusion across C4-C5 and C6-C7 is noted. Included portion of the  posterior fossa is within normal limits. The craniocervical junction is normal. On STIR images, there is heterogeneous marrow signal noted within all the cervical vertebral bodies, probably related to type I marrow changes without definite endplate edema. Spinal cord signal intensity is grossly within normal limits to the study is degraded by motion artifact. Cervical spine levels: C2-3: Mild bilateral facet degeneration. No significant canal stenosis or foraminal narrowing C3-4:  Broad-based disc bulge and endplate disc osteophyte complex with bilateral uncovertebral and facet degeneration results in mild right foraminal narrowing. There is no significant canal stenosis. C4-5:  Fused segment. No canal or foraminal narrowing. C5-6: Broad-based endplate disc osteophyte complex encroaches upon the spinal canal. There is bilateral advanced uncovertebral and facet degeneration resulting in moderate right foraminal narrowing. There is moderate canal narrowing at this level. There is mild left foraminal narrowing. C6-7: Fused segment. No stenosis. C7-T1: Mild endplate disc osteophyte complex, eccentrically more prominent along the left paracentral region. There is mild right foraminal narrowing. There is moderate left foraminal narrowing. Postcontrast images through the cervical spine do not demonstrate any definite abnormal enhancement in the vertebral bodies or in the epidural space. No obvious prevertebral or paravertebral enhancement is identified. The pre and paravertebral soft tissues are grossly within normal limits on the noncontrast T2 and gradient-echo images.     1.  Degenerative changes in the cervical spine as described above with moderate canal narrowing at C5-6 and moderate right foraminal narrowing. 2.  No definite cord signal abnormality is identified, however the study is limited by motion artifact. 3.  Within the limitations imposed by motion artifact, there is no evidence of abnormal marrow signal,  prevertebral or paravertebral phlegmon or epidural enhancement to suggest an infection.    MR-LUMBAR SPINE-WITH & W/O    Result Date: 11/2/2021 11/1/2021 10:34 AM HISTORY/REASON FOR EXAM:  Low back pain, infection suspected TECHNIQUE/EXAM DESCRIPTION: MRI of the lumbar spine without and with contrast. The study was performed on a [employer] Signa 1.5 Antonina MRI scanner. T1 sagittal, T2 fast spin-echo sagittal, and T2 axial images were obtained of the lumbar spine. T1 postcontrast fat-suppressed sagittal images were obtained. 18 mL ProHance contrast was administered intravenously. COMPARISON:  None. FINDINGS: Lumbar spine alignment is within normal limits. Vertebral body heights are preserved. Intervertebral disc heights are within normal limits. Bone marrow signal intensity is within normal limits. T12-L1: Mild facet degeneration. No stenosis. L1-2: Mild facet degeneration without stenosis. L2-3: Minimal facet degeneration. No stenosis. L3-4: Minimal facet degeneration. No stenosis. Neuroforamina are normal. L4-5: Broad-based disc bulge and bilateral facet degeneration. There is mild right foraminal narrowing without definite exiting nerve impingement. There is mild lateral recess narrowing at this level. L5-S1: Mild facet degeneration. No stenosis. Bilateral renal cystlike lesions are present. Postcontrast images through the lumbar spine do not demonstrate any abnormal enhancement. Minimal enhancement along the right-sided facet joint at L4-5 is likely related to synovial hypertrophy. No adjacent soft tissue swelling is identified. Visualized prevertebral soft tissues are without obvious abnormality.     Mild lumbar spine degenerative changes without significant spinal canal stenosis. There is mild right foraminal narrowing at L4-5 without definite exiting nerve root impingement. No abnormal enhancement is identified in the lumbar spine.     MR-THORACIC SPINE-WITH & W/O    Result Date: 11/2/2021 11/1/2021 10:34 AM  HISTORY/REASON FOR EXAM:  infection suspected TECHNIQUE/EXAM DESCRIPTION: MRI of the thoracic spine without and with contrast. The study was performed on a [employer] Signa 1.5 Antonina MRI scanner. T1 sagittal, T2 fast spin-echo sagittal, and T2 axial images were obtained of the thoracic spine. T1 post-contrast fat suppressed sagittal images were obtained. Optional T1 post-contrast axial images may be obtained. 18 mL ProHance contrast was administered intravenously. COMPARISON:  None. FINDINGS: Thoracic spine alignment is within normal limits. Vertebral body heights are preserved. A small hemangioma is noted at T10. Spinal cord signal throughout the thoracic cord is within normal limits. Thoracic neural foramina are within normal limits. There is no significant canal narrowing or foraminal stenosis in the thoracic spine. A small left paracentral protrusion is noted at the T7-T8 level without significant encroachment upon the spinal canal. The pre and paravertebral soft tissues are within normal limits. There is probably bilateral pleural effusion.     No significant abnormality of the thoracic spine. There is no abnormal cord signal or abnormal enhancement.    EC-ECHOCARDIOGRAM LTD W/O CONT    Result Date: 10/30/2021  Transthoracic Echo Report Echocardiography Laboratory CONCLUSIONS Limited study to evaluate ICD wires, right atrium and right ventricle for endocarditis. Echodense partially mobile mass seen in the right atrium. Difficult to assess if this is attached to the ICD wire. No evidence of tricuspid valve endocarditis. Small pericardial effusion without evidence of hemodynamic compromise. MARZENA ROMEO Exam Date:         10/30/2021                    10:31 Exam Location:     Inpatient Priority:          Routine Ordering Physician:        FALLON RASMUSSEN Referring Physician: Sonographer:               Lisandra Arellano RDCS Age:    71     Gender:    M MRN:    [id number] :    1950 BSA:    2.15   Ht (in):    75      Wt (lb):    190 Exam Type:     Limited Indications:     Acute and subacute endocarditis, unspecified ICD Codes:       I33.9 CPT Codes:       54884, 77846, 99369 BP:   140    /   73     HR: Technical Quality:       Fair MEASUREMENTS  (Male / Female) Normal Values 2D ECHO LV Ejection Fraction MOD 4C       49.2 %                DOPPLER TR Peak Velocity                  238 cm/s              * Indicates values subject to auto-interpretation LV EF:        % FINDINGS Left Ventricle Right Ventricle Normal right ventricular size and systolic function. Right Atrium Echo bright semi mobile density seen in right atrium. Difficult to assess if this is attached to ICD wires. Left Atrium Mitral Valve Aortic Valve Tricuspid Valve Structurally normal tricuspid valve. No tricuspid stenosis. Trace tricuspid regurgitation. Pulmonic Valve Pericardium Small pericardial effusion without evidence of hemodynamic compromise. Aorta Beau Gutierrez MD (Electronically Signed) Final Date:     30 October 2021                 17:40    EC-PAMELA W/O CONT    Result Date: 10/30/2021  Results Will be Available after Interpretation by Cardiologist.      Micro:  Results     ** No results found for the last 168 hours. **          Assessment:/Plan:  MSSA bacteremia  AICD in place  Right atrial mass  BCxs + MSSA 10/24 and 10/26  Repeat Bcxs 10/28 and 10/30 neg  TTE with large right atrial mass  PAMELA official report pending  Plan is for explantation of AICD -transfer [facility] 11/3  Continue IV cefazolin 2 g every 8 hours  Anticipate 6 weeks IV abx from date of ICD removal if no additional positive blood cultures.  Patient states he has done IV antibiotics at home before     New back pain  Bilateral shoulder/arm pain  MRI C,T, and L spine with no evidence of vertebral osteomyelitis or epidural abscess  MRI of the abdomen with no obvious evidence of seeding of infection     CVA  Mult CVA by MRI, chronic  No  bleed     Immunosuppressed  PV  Lupus     Diabetes  Hemoglobin A1c was 7.4, may contribute to severity of infection     Discussed with Pharm.D.     ID will follow.  Please call with questions.

## 2021-11-03 NOTE — CONSULTS
Electrophysiology initial Consultation    Date of Service  11/3/2021    Referring Physician  Rosanna Ramírez M.D.    Reason for Consultation  MSSA sepsis with recent AICD placement    History of Presenting Illness  Bob Schulte is a 71 y.o. male with a past medical history of ischemic cardiomyopathy status post primary prevention AICD [place] who presented 2021 with sepsis.  Transfer from [facility] for extraction.  Device placed a few months ago.  Narrow QRS.  Never received a shock.  History of PAF.  Anticoagulation has been held.  Previous CVA.  No obvious infection at the pocket.    Review of Systems  Review of Systems    Past Medical History   has a past medical history of Agent orange exposure, Anesthesia, Cancer ([facility]), Diabetes ([facility]), Family history of punctured lung (), Heart attack ([facility]) (2017), Hemorrhagic disorder ([facility]), High cholesterol, Ischemic cardiomyopathy, Kidney stones, Orthostatic hypotension (3/29/2018), Pain, Polycythemia vera(238.4), Stroke ([facility]) (), Urinary bladder disorder, and Vertigo.    Surgical History   has a past surgical history that includes other cardiac surgery; cath placement; laminotomy; appendectomy; carotid endarterectomy (Right, 3/21/2018); temporal artery biopsy (Right, 2018); pr incis/drain scrotum/testis,epididym (Right, 2020); pr explore scrotum (Right, 2020); pr explore scrotum (Right, 2020); skin abscess incision and drainage (Right, 8/3/2020); split thickness skin graft (N/A, 2020); aicd implant (2021); and jacqueline (N/A, 10/30/2021).    Family History  family history is not on file.    Social History   reports that he has never smoked. He has never used smokeless tobacco. He reports that he does not drink alcohol and does not use drugs.    Medications  Prior to Admission Medications   Prescriptions Last Dose Informant Patient Reported? Taking?   Blood Glucose Monitoring Suppl (ONETOUCH VERIO) w/Device Kit   No No   Si  "Device 3 times a day before meals.   Blood Glucose Test Strips   No No   Sig: verio test strips for glucose monitoring TID and PRN   Insulin Degludec (TRESIBA FLEXTOUCH) 200 UNIT/ML Solution Pen-injector  Patient No No   Sig: Inject 12 Units under the skin every evening.   Patient taking differently: Inject 6 Units under the skin every morning.   apixaban (ELIQUIS) 5mg Tab   No No   Sig: Take 1 Tablet by mouth 2 times a day.   aspirin EC (ECOTRIN) 81 MG Tablet Delayed Response  Patient Yes No   Sig: Take 1 Tab by mouth every day.   bumetanide (BUMEX) 0.5 MG Tab   No No   Sig: Take 1 tablet by mouth every day.   dicyclomine (BENTYL) 10 MG Cap   Yes No   Sig: Take 10 mg by mouth every day.   digoxin (LANOXIN) 125 MCG Tab  Patient No No   Sig: Take 1 tablet by mouth every day at 6 PM.   docusate sodium (COLACE) 100 MG Cap  Patient No No   Sig: Take 1 capsule by mouth 2 times a day as needed for Constipation.   finasteride (PROSCAR) 5 MG Tab   No No   Sig: TAKE 1 TABLET BY MOUTH EVERY DAY   hydroxyurea (HYDREA) 500 MG Cap  Patient Yes No   Sig: Take 500 mg by mouth 2 Times a Day.   metoprolol SR (TOPROL XL) 25 MG TABLET SR 24 HR  Patient No No   Sig: Take 1 tablet by mouth every evening.   nitroglycerin (NITROSTAT) 0.4 MG SL Tab   No No   Sig: Place 1 Tablet under the tongue as needed for Chest Pain (or hypertension >180/110).   tamsulosin (FLOMAX) 0.4 MG capsule   Yes No   Sig: Take 0.4 mg by mouth every day.   therapeutic multivitamin-minerals (THERAGRAN-M) Tab  Patient Yes No   Sig: Take 1 Tab by mouth every day.      Facility-Administered Medications: None       Allergies  Allergies   Allergen Reactions    Doxycycline      Swelling lips and difficulties swallowing    Atorvastatin     Beta Adrenergic Blockers Unspecified     dizziness    Metformin Unspecified and Palpitations     Cardiac effects  \"Similar to a heart attack, I was hospitalized\"    Simvastatin      Other reaction(s): Other (Specify with " Comments)  Myalgias  Myalgias    Statins [Hmg-Coa-R Inhibitors]      Muscle ache, joint pain       Vital signs in last 24 hours  Temp:  [36 °C (96.8 °F)-37.1 °C (98.7 °F)] 36 °C (96.8 °F)  Pulse:  [87-97] 93  Resp:  [17-18] 18  BP: (128-168)/() 128/79  SpO2:  [94 %-95 %] 94 %    Physical Exam  Physical Exam  Vitals reviewed.   Constitutional:       General: He is not in acute distress.     Appearance: He is well-developed. He is not diaphoretic.   Eyes:      Conjunctiva/sclera: Conjunctivae normal.   Neck:      Thyroid: No thyroid mass or thyromegaly.      Vascular: No JVD.      Trachea: No tracheal deviation.   Cardiovascular:      Rate and Rhythm: Normal rate and regular rhythm.      Heart sounds: No murmur heard.        Comments: Device site clear  Pulmonary:      Effort: Pulmonary effort is normal. No respiratory distress.      Breath sounds: Normal breath sounds.   Chest:      Chest wall: No tenderness.   Abdominal:      Palpations: Abdomen is soft.      Tenderness: There is no abdominal tenderness.   Musculoskeletal:         General: Normal range of motion.   Skin:     General: Skin is warm and dry.   Neurological:      Mental Status: He is alert and oriented to person, place, and time.      Motor: No tremor.   Psychiatric:         Behavior: Behavior normal.         Lab Review  Lab Results   Component Value Date/Time    WBC 7.4 11/02/2021 04:57 AM    RBC 3.96 (L) 11/02/2021 04:57 AM    HEMOGLOBIN 14.3 11/02/2021 04:57 AM    HEMATOCRIT 43.9 11/02/2021 04:57 AM    .9 (H) 11/02/2021 04:57 AM    MCH 36.1 (H) 11/02/2021 04:57 AM    MCHC 32.6 (L) 11/02/2021 04:57 AM    MPV 11.0 11/02/2021 04:57 AM      Lab Results   Component Value Date/Time    SODIUM 140 11/02/2021 04:57 AM    POTASSIUM 3.7 11/02/2021 04:57 AM    CHLORIDE 104 11/02/2021 04:57 AM    CO2 26 11/02/2021 04:57 AM    GLUCOSE 184 (H) 11/02/2021 04:57 AM    BUN 10 11/02/2021 04:57 AM    CREATININE 0.55 11/02/2021 04:57 AM      Lab Results    Component Value Date/Time    ASTSGOT 12 11/02/2021 04:57 AM    ALTSGPT 6 11/02/2021 04:57 AM     Lab Results   Component Value Date/Time    CHOLSTRLTOT 145 01/20/2021 09:32 AM    LDL 90 01/20/2021 09:32 AM    HDL 33 (A) 01/20/2021 09:32 AM    TRIGLYCERIDE 110 01/20/2021 09:32 AM    TROPONINT 18 10/31/2021 08:04 PM       No results for input(s): NTPROBNP in the last 72 hours.    Cardiac Imaging and Procedures Review  EKG:  My personal interpretation of the EKG dated 10/31/2021 is sinus rhythm, left anterior hemiblock.  Old anterior wall MI.  Narrow QRS.    Echocardiogram  CONCLUSIONS  Severely dilated LV and severely reduced LV and RVsystolic function.   There is global hypokinesis and apical akinesis. No evidence of LV   thrombus. LVEF estimated about 15-20%. No significant valuvlar   dysfunction.     Cardiac Catheterization: Previous LAD stenting        Assessment/Plan  No new Assessment & Plan notes have been filed under this hospital service since the last note was generated.  Service: Cardiac Electrophysiology  1.  MSSA sepsis.  Possible mobile mass in right atrium consistent with endocarditis.  Recommendation removal of system.  The risks, benefits, and alternatives to transvenous endocardial lead extraction were discussed in great detail, specific risks mentioned including bleeding, infection, cardiac perforation with possible tamponade requiring percardiocentesis or possible open heart surgery. Also mentioned were the possibilities of superior vena cava perforation with possible hemothorax. Overall, the liklihood of successful complete lead extraction without lead fragments is dependent on total length of time leads have been in place.  In addition the risk of death, stroke and myocardial infarction were discuss. The patient verbalized understanding of these potential complications and wishes to proceed with this procedure. Risk of death for lead extraction at approximately 1%.      Thank you for allowing  me to participate in the care of this patient.    I will continue to follow this patient    Please contact me with any questions.    Adin Neil M.D.   Cardiologist, [facility]  [phone]

## 2021-11-03 NOTE — H&P
Hospital Medicine History & Physical Note    Date of Service  11/2/2021    Primary Care Physician  ALVINO Harman        Code Status  Full Code    Chief Complaint  No chief complaint on file.      History of Presenting Illness  Francesco Schulte is a 71 y.o. male with DM2, DLP, HFrEF s/p AICD, A. fib on anticoagulation who presented as transfer from [facility] 11/2/2021 with concern for endocarditis, positive MSSA blood cultures and PAMELA with possible mobile mass in right atrium versus ICD wire.  Cardiology is consulted removal of AICD and and subsequently he is transferred to [facility].  Following up MRI cervical, thoracic and lumbar spine are reportedly unremarkable for endocarditis, infarct or abscess.  At bedside he is comfortable without complaints.  Denying chest pain, nausea and vomiting.  He is n.p.o. for planned AICD removal in a.m.  Cardiologist Dr. Isaac recommends cardiology consult in a.m.        I discussed the plan of care with bedside RN.    Review of Systems  Review of Systems   Constitutional: Positive for malaise/fatigue. Negative for fever and weight loss.   HENT: Negative for sore throat and tinnitus.    Eyes: Negative for blurred vision and double vision.   Respiratory: Negative for cough, hemoptysis and stridor.    Cardiovascular: Negative for chest pain and palpitations.   Gastrointestinal: Negative for nausea and vomiting.   Genitourinary: Negative for dysuria and urgency.   Musculoskeletal: Negative for myalgias and neck pain.   Skin: Negative for itching and rash.   Neurological: Positive for weakness. Negative for dizziness and headaches.   Endo/Heme/Allergies: Does not bruise/bleed easily.   Psychiatric/Behavioral: Negative for depression. The patient does not have insomnia.        Past Medical History   has a past medical history of Agent orange exposure, Anesthesia, Cancer (HCC), Diabetes (HCC), Family history of punctured lung (2002), Heart attack  "([facility]) (2017), Hemorrhagic disorder ([facility]), High cholesterol, Ischemic cardiomyopathy, Kidney stones, Orthostatic hypotension (3/29/2018), Pain, Polycythemia vera(238.4), Stroke ([facility]) (), Urinary bladder disorder, and Vertigo.    Surgical History   has a past surgical history that includes other cardiac surgery; cath placement; laminotomy; appendectomy; carotid endarterectomy (Right, 3/21/2018); temporal artery biopsy (Right, 2018); pr incis/drain scrotum/testis,epididym (Right, 2020); pr explore scrotum (Right, 2020); pr explore scrotum (Right, 2020); skin abscess incision and drainage (Right, 8/3/2020); split thickness skin graft (N/A, 2020); aicd implant (2021); and jacqueline (N/A, 10/30/2021).     Family History  family history is not on file.   Family history reviewed with patient. There is no family history that is pertinent to the chief complaint.     Social History   reports that he has never smoked. He has never used smokeless tobacco. He reports that he does not drink alcohol and does not use drugs.    Allergies  Allergies   Allergen Reactions   • Doxycycline      Swelling lips and difficulties swallowing   • Atorvastatin    • Beta Adrenergic Blockers Unspecified     dizziness   • Metformin Unspecified and Palpitations     Cardiac effects  \"Similar to a heart attack, I was hospitalized\"   • Simvastatin      Other reaction(s): Other (Specify with Comments)  Myalgias  Myalgias   • Statins [Hmg-Coa-R Inhibitors]      Muscle ache, joint pain       Medications  Prior to Admission Medications   Prescriptions Last Dose Informant Patient Reported? Taking?   Blood Glucose Monitoring Suppl (ONETOUCH VERIO) w/Device Kit   No No   Si Device 3 times a day before meals.   Blood Glucose Test Strips   No No   Sig: verio test strips for glucose monitoring TID and PRN   Insulin Degludec (TRESIBA FLEXTOUCH) 200 UNIT/ML Solution Pen-injector  Patient No No   Sig: Inject 12 Units under the " skin every evening.   Patient taking differently: Inject 6 Units under the skin every morning.   apixaban (ELIQUIS) 5mg Tab   No No   Sig: Take 1 Tablet by mouth 2 times a day.   aspirin EC (ECOTRIN) 81 MG Tablet Delayed Response  Patient Yes No   Sig: Take 1 Tab by mouth every day.   bumetanide (BUMEX) 0.5 MG Tab   No No   Sig: Take 1 tablet by mouth every day.   dicyclomine (BENTYL) 10 MG Cap   Yes No   Sig: Take 10 mg by mouth every day.   digoxin (LANOXIN) 125 MCG Tab  Patient No No   Sig: Take 1 tablet by mouth every day at 6 PM.   docusate sodium (COLACE) 100 MG Cap  Patient No No   Sig: Take 1 capsule by mouth 2 times a day as needed for Constipation.   finasteride (PROSCAR) 5 MG Tab   No No   Sig: TAKE 1 TABLET BY MOUTH EVERY DAY   hydroxyurea (HYDREA) 500 MG Cap  Patient Yes No   Sig: Take 500 mg by mouth 2 Times a Day.   metoprolol SR (TOPROL XL) 25 MG TABLET SR 24 HR  Patient No No   Sig: Take 1 tablet by mouth every evening.   nitroglycerin (NITROSTAT) 0.4 MG SL Tab   No No   Sig: Place 1 Tablet under the tongue as needed for Chest Pain (or hypertension >180/110).   tamsulosin (FLOMAX) 0.4 MG capsule   Yes No   Sig: Take 0.4 mg by mouth every day.   therapeutic multivitamin-minerals (THERAGRAN-M) Tab  Patient Yes No   Sig: Take 1 Tab by mouth every day.      Facility-Administered Medications: None       Physical Exam  Temp:  [36.5 °C (97.7 °F)-37.2 °C (98.9 °F)] 37.1 °C (98.7 °F)  Pulse:  [77-87] 87  Resp:  [15-20] 18  BP: (118-168)/(63-93) 168/93  SpO2:  [90 %-98 %] 94 %  Blood Pressure : (!) 168/93   Temperature: 37.1 °C (98.7 °F)   Pulse: 87   Respiration: 18   Pulse Oximetry: 94 %       Physical Exam  Vitals and nursing note reviewed.   Constitutional:       General: He is not in acute distress.     Appearance: Normal appearance. He is normal weight. He is not toxic-appearing.   HENT:      Head: Normocephalic and atraumatic.      Nose: Nose normal. No congestion or rhinorrhea.      Mouth/Throat:       Mouth: Mucous membranes are moist.      Pharynx: Oropharynx is clear.   Eyes:      Extraocular Movements: Extraocular movements intact.      Conjunctiva/sclera: Conjunctivae normal.      Pupils: Pupils are equal, round, and reactive to light.   Neck:      Vascular: No carotid bruit.   Cardiovascular:      Rate and Rhythm: Normal rate. Rhythm irregular.      Pulses: Normal pulses.      Heart sounds: Murmur heard.   No gallop.    Pulmonary:      Effort: No respiratory distress.      Breath sounds: Normal breath sounds. No wheezing or rales.   Abdominal:      General: Abdomen is flat. Bowel sounds are normal. There is no distension.      Palpations: Abdomen is soft. There is no mass.      Tenderness: There is no abdominal tenderness.      Hernia: No hernia is present.   Musculoskeletal:         General: No tenderness or signs of injury.      Cervical back: Normal range of motion and neck supple. No muscular tenderness.   Lymphadenopathy:      Cervical: No cervical adenopathy.   Skin:     Capillary Refill: Capillary refill takes less than 2 seconds.      Coloration: Skin is not jaundiced or pale.      Findings: No bruising.   Neurological:      General: No focal deficit present.      Mental Status: He is alert and oriented to person, place, and time. Mental status is at baseline.      Cranial Nerves: No cranial nerve deficit.      Motor: No weakness.      Coordination: Coordination normal.   Psychiatric:         Mood and Affect: Mood normal.         Thought Content: Thought content normal.         Judgment: Judgment normal.         Laboratory:  Recent Labs     10/31/21  0331 11/01/21  0520 11/02/21  0457   WBC 8.2 8.0 7.4   RBC 4.44* 4.09* 3.96*   HEMOGLOBIN 15.9 14.8 14.3   HEMATOCRIT 48.3 43.8 43.9   .8* 107.1* 110.9*   MCH 35.8* 36.2* 36.1*   MCHC 32.9* 33.8 32.6*   RDW 63.6* 61.4* 61.8*   PLATELETCT 217 254 270   MPV 11.1 11.1 11.0     Recent Labs     10/31/21  0331 11/01/21  0520 11/02/21  0457   SODIUM 138  137 140   POTASSIUM 3.6 3.2* 3.7   CHLORIDE 105 102 104   CO2 19* 24 26   GLUCOSE 227* 154* 184*   BUN 10 8 10   CREATININE 0.69 0.59 0.55   CALCIUM 8.4 8.5 8.6     Recent Labs     10/31/21  0331 11/01/21  0520 11/02/21  0457   ALTSGPT  --   --  6   ASTSGOT  --   --  12   ALKPHOSPHAT  --   --  78   TBILIRUBIN  --   --  0.7   GLUCOSE 227* 154* 184*         No results for input(s): NTPROBNP in the last 72 hours.      Recent Labs     10/31/21  1354 10/31/21  2004   TROPONINT 17 18       Imaging:  CL-ICD,PM,BIV LEAD EXTRACTION W/ ANESTHESIA    (Results Pending)       EKG:  I have personally reviewed the images and compared with prior images.    Assessment/Plan:  I anticipate this patient will require at least two midnights for appropriate medical management, necessitating inpatient admission.    Acute bacterial endocarditis  Assessment & Plan  Per ID consult continue IV cefazolin 2 g every 8 hours  Anticipate 6 weeks IV abx from date of ICD removal if no additional positive blood cultures.    Patient states he has done IV antibiotics at home before    Type 2 diabetes mellitus without complication, with long-term current use of insulin (HCC)- (present on admission)  Assessment & Plan  Continue Lantus with regular insulin sliding scale before every meal as needed    Chronic systolic heart failure (HCC)- (present on admission)  Assessment & Plan  Compensated, follow-up cardiology consult    Paroxysmal A-fib (HCC)- (present on admission)  Assessment & Plan  Currently in sinus rhythm, anticoagulation held for ICD removal      VTE prophylaxis: SCDs.

## 2021-11-04 ENCOUNTER — APPOINTMENT (OUTPATIENT)
Dept: RADIOLOGY | Facility: MEDICAL CENTER | Age: 71
DRG: 261 | End: 2021-11-04
Attending: INTERNAL MEDICINE
Payer: MEDICARE

## 2021-11-04 ENCOUNTER — APPOINTMENT (OUTPATIENT)
Dept: CARDIOLOGY | Facility: MEDICAL CENTER | Age: 71
DRG: 261 | End: 2021-11-04
Attending: INTERNAL MEDICINE
Payer: MEDICARE

## 2021-11-04 LAB
BACTERIA BLD CULT: NORMAL
BACTERIA BLD CULT: NORMAL
EKG IMPRESSION: NORMAL
GLUCOSE BLD-MCNC: 197 MG/DL (ref 65–99)
GLUCOSE BLD-MCNC: 226 MG/DL (ref 65–99)
GLUCOSE BLD-MCNC: 228 MG/DL (ref 65–99)
GLUCOSE BLD-MCNC: 241 MG/DL (ref 65–99)
GLUCOSE BLD-MCNC: 281 MG/DL (ref 65–99)
SIGNIFICANT IND 70042: NORMAL
SIGNIFICANT IND 70042: NORMAL
SITE SITE: NORMAL
SITE SITE: NORMAL
SOURCE SOURCE: NORMAL
SOURCE SOURCE: NORMAL

## 2021-11-04 PROCEDURE — 99153 MOD SED SAME PHYS/QHP EA: CPT

## 2021-11-04 PROCEDURE — 700101 HCHG RX REV CODE 250

## 2021-11-04 PROCEDURE — 02PA3MZ REMOVAL OF CARDIAC LEAD FROM HEART, PERCUTANEOUS APPROACH: ICD-10-PCS | Performed by: INTERNAL MEDICINE

## 2021-11-04 PROCEDURE — 99232 SBSQ HOSP IP/OBS MODERATE 35: CPT | Mod: 25 | Performed by: NURSE PRACTITIONER

## 2021-11-04 PROCEDURE — 0JPT3PZ REMOVAL OF CARDIAC RHYTHM RELATED DEVICE FROM TRUNK SUBCUTANEOUS TISSUE AND FASCIA, PERCUTANEOUS APPROACH: ICD-10-PCS | Performed by: INTERNAL MEDICINE

## 2021-11-04 PROCEDURE — 700111 HCHG RX REV CODE 636 W/ 250 OVERRIDE (IP)

## 2021-11-04 PROCEDURE — 93010 ELECTROCARDIOGRAM REPORT: CPT | Performed by: INTERNAL MEDICINE

## 2021-11-04 PROCEDURE — 33241 REMOVE PULSE GENERATOR: CPT | Performed by: INTERNAL MEDICINE

## 2021-11-04 PROCEDURE — 82962 GLUCOSE BLOOD TEST: CPT | Mod: 91

## 2021-11-04 PROCEDURE — 51798 US URINE CAPACITY MEASURE: CPT

## 2021-11-04 PROCEDURE — 770020 HCHG ROOM/CARE - TELE (206)

## 2021-11-04 PROCEDURE — A9270 NON-COVERED ITEM OR SERVICE: HCPCS | Performed by: INTERNAL MEDICINE

## 2021-11-04 PROCEDURE — 93005 ELECTROCARDIOGRAM TRACING: CPT | Performed by: INTERNAL MEDICINE

## 2021-11-04 PROCEDURE — 700102 HCHG RX REV CODE 250 W/ 637 OVERRIDE(OP): Performed by: INTERNAL MEDICINE

## 2021-11-04 PROCEDURE — 71045 X-RAY EXAM CHEST 1 VIEW: CPT

## 2021-11-04 PROCEDURE — 700111 HCHG RX REV CODE 636 W/ 250 OVERRIDE (IP): Performed by: INTERNAL MEDICINE

## 2021-11-04 PROCEDURE — 33244 REMOVE ELCTRD TRANSVENOUSLY: CPT | Performed by: INTERNAL MEDICINE

## 2021-11-04 PROCEDURE — 99233 SBSQ HOSP IP/OBS HIGH 50: CPT | Performed by: INTERNAL MEDICINE

## 2021-11-04 PROCEDURE — 99152 MOD SED SAME PHYS/QHP 5/>YRS: CPT | Performed by: INTERNAL MEDICINE

## 2021-11-04 RX ORDER — MIDAZOLAM HYDROCHLORIDE 1 MG/ML
INJECTION INTRAMUSCULAR; INTRAVENOUS
Status: COMPLETED
Start: 2021-11-04 | End: 2021-11-04

## 2021-11-04 RX ORDER — LIDOCAINE HYDROCHLORIDE 20 MG/ML
INJECTION, SOLUTION INFILTRATION; PERINEURAL
Status: COMPLETED
Start: 2021-11-04 | End: 2021-11-04

## 2021-11-04 RX ORDER — CEFAZOLIN SODIUM 1 G/3ML
INJECTION, POWDER, FOR SOLUTION INTRAMUSCULAR; INTRAVENOUS
Status: COMPLETED
Start: 2021-11-04 | End: 2021-11-04

## 2021-11-04 RX ADMIN — OXYCODONE 5 MG: 5 TABLET ORAL at 17:04

## 2021-11-04 RX ADMIN — INSULIN HUMAN 5 UNITS: 100 INJECTION, SOLUTION PARENTERAL at 20:22

## 2021-11-04 RX ADMIN — FENTANYL CITRATE 50 MCG: 50 INJECTION, SOLUTION INTRAMUSCULAR; INTRAVENOUS at 12:04

## 2021-11-04 RX ADMIN — CEFAZOLIN SODIUM 2 G: 2 INJECTION, SOLUTION INTRAVENOUS at 13:41

## 2021-11-04 RX ADMIN — LIDOCAINE HYDROCHLORIDE 15 ML: 20 SOLUTION OROPHARYNGEAL at 11:10

## 2021-11-04 RX ADMIN — OXYCODONE 5 MG: 5 TABLET ORAL at 05:21

## 2021-11-04 RX ADMIN — DIGOXIN 125 MCG: 125 TABLET ORAL at 17:04

## 2021-11-04 RX ADMIN — CEFAZOLIN 1000 MG: 330 INJECTION, POWDER, FOR SOLUTION INTRAMUSCULAR; INTRAVENOUS at 11:50

## 2021-11-04 RX ADMIN — MIDAZOLAM HYDROCHLORIDE 1 MG: 1 INJECTION, SOLUTION INTRAMUSCULAR; INTRAVENOUS at 12:04

## 2021-11-04 RX ADMIN — METOPROLOL SUCCINATE 25 MG: 25 TABLET, EXTENDED RELEASE ORAL at 05:20

## 2021-11-04 RX ADMIN — SENNOSIDES AND DOCUSATE SODIUM 2 TABLET: 50; 8.6 TABLET ORAL at 05:21

## 2021-11-04 RX ADMIN — CEFAZOLIN SODIUM 2 G: 2 INJECTION, SOLUTION INTRAVENOUS at 21:10

## 2021-11-04 RX ADMIN — CEFAZOLIN SODIUM 2 G: 2 INJECTION, SOLUTION INTRAVENOUS at 05:05

## 2021-11-04 RX ADMIN — FINASTERIDE 5 MG: 5 TABLET, FILM COATED ORAL at 05:21

## 2021-11-04 RX ADMIN — GUAIFENESIN 200 MG: 100 SOLUTION ORAL at 11:10

## 2021-11-04 RX ADMIN — TAMSULOSIN HYDROCHLORIDE 0.4 MG: 0.4 CAPSULE ORAL at 08:58

## 2021-11-04 RX ADMIN — GUAIFENESIN 200 MG: 100 SOLUTION ORAL at 21:10

## 2021-11-04 RX ADMIN — OXYCODONE 5 MG: 5 TABLET ORAL at 09:06

## 2021-11-04 RX ADMIN — LIDOCAINE HYDROCHLORIDE: 20 INJECTION, SOLUTION INFILTRATION; PERINEURAL at 11:50

## 2021-11-04 RX ADMIN — OXYCODONE 5 MG: 5 TABLET ORAL at 20:17

## 2021-11-04 RX ADMIN — INSULIN HUMAN 3 UNITS: 100 INJECTION, SOLUTION PARENTERAL at 17:40

## 2021-11-04 RX ADMIN — Medication 5 MG: at 20:17

## 2021-11-04 RX ADMIN — INSULIN GLARGINE 6 UNITS: 100 INJECTION, SOLUTION SUBCUTANEOUS at 05:08

## 2021-11-04 ASSESSMENT — PAIN DESCRIPTION - PAIN TYPE
TYPE: ACUTE PAIN;CHRONIC PAIN
TYPE: ACUTE PAIN;CHRONIC PAIN
TYPE: ACUTE PAIN;SURGICAL PAIN

## 2021-11-04 ASSESSMENT — ENCOUNTER SYMPTOMS
MYALGIAS: 1
FEVER: 0
SHORTNESS OF BREATH: 1
WEAKNESS: 1
SHORTNESS OF BREATH: 0
ABDOMINAL PAIN: 1
PALPITATIONS: 0
DIZZINESS: 0
FATIGUE: 1
CHEST TIGHTNESS: 0
ABDOMINAL PAIN: 0
COUGH: 1
NAUSEA: 1
BLOOD IN STOOL: 0

## 2021-11-04 NOTE — CARE PLAN
The patient is Watcher - Medium risk of patient condition declining or worsening    Shift Goals  Clinical Goals: Manage pain, remain free from falls and maintain safety, decrease sxs of infection, admin Abx, NPO for procedure, monitor BP and HR  Patient Goals: Comfort, rest  Family Goals: KAL. No family present.     Progress made toward(s) clinical / shift goals:      Problem: Knowledge Deficit - Standard  Goal: Patient and family/care givers will demonstrate understanding of plan of care, disease process/condition, diagnostic tests and medications  Outcome: Progressing     Problem: Pain - Standard  Goal: Alleviation of pain or a reduction in pain to the patient’s comfort goal  Outcome: Progressing     Problem: Fall Risk  Goal: Patient will remain free from falls  Outcome: Progressing     Problem: Diabetes Management  Goal: Patient will achieve and maintain glucose in satisfactory range  Outcome: Progressing       Patient is not progressing towards the following goals: NA

## 2021-11-04 NOTE — OP REPORT
12:52 PM         Electrophysiology Procedure Note  [facility]      PROCEDURE:   Removal of dual-chamber defibrillator leads and defibrillator pulse generator, moderate sedation administered by RN and supervised by physician    : Adin Neil M.D.    ANESTHESIOLOGIST: None    ANESTHESIA: Moderate sedation,  start time 1155, stop time 1215  the moderate sedation document has been reviewed, signed and scanned into media     ESTIMATED BLOOD LOSS: 15 cc.    SPECIMENS: None    COMPLICATIONS: None    INDICATION: MSSA sepsis     PRE-PROCEDURE ECG: Sinus rhythm     POST-PROCEDURE ECG: Sinus rhythm    DESCRIPTION OF PROCEDURE: After informed written consent, the patient was brought to the cardiac catheterization lab in fasting state.  The patient was prepped and draped in the usual sterile fashion.  Moderate sedation was given to the patient. . An incision was made with a scalpel along the old scar of the device. Access to the device pocket was made using a combination of blunt dissection and electrocautery. The old generator and leads were freed from adhesions and the generator disconnected from the leads. Leads were freed from the suture sleeve.  No purulent material seen. Lead screws were retracted and the leads were removed with direct traction. All suture material was removed. The pocket was irrigated with antibiotic solution. The wound was closed with three layers of absorbable sutures and covered with Steri-Strips. Following recovery from sedation, the patient was transferred to a monitored bed..    IMPLANTED DEVICE INFORMATION:    Pulse generator removed is a [employer] model D233  Serial number [id number]      LEADS REMOVED  INFORMATION:  1. Right atrial lead removed is a [employer] model 7841, serial number [id number], date of implant 7/29/2021  2. Right ventricular lead removed is a [employer] model 0673, serial number [id number], date of implant 7/29/2021    TOTAL FLUORO TIME: 0.5 min, 4 mGy      IMPRESSIONS:  1. Successful dual-chamber defibrillator system removal    RECOMMENDATIONS:  1. Transfer to monitored bed.

## 2021-11-04 NOTE — PROGRESS NOTES
Cardiology Follow Up Progress Note    Date of Service  11/4/2021    Attending Physician  Rosanna Ramírez M.D.    Chief Complaint   ICD extraction    HPI  Bob Schulte is a 71 y.o. male admitted 11/2/2021 with MSSA sepsis. PAMELA showed possible mobile mass in right atrium consistent with endocarditis, extraction of ICD system was recommended.     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     Interim Events  Continues to feel weak.   Plan is for ICD extraction today.   Remains afebrile.   SR/ST on tele with rare ectopy.   WBC WNL.     Review of Systems  Review of Systems   Constitutional: Positive for fatigue. Negative for fever.   Respiratory: Negative for chest tightness and shortness of breath.    Cardiovascular: Negative for chest pain, palpitations and leg swelling.   Gastrointestinal: Negative for abdominal pain and blood in stool.   Genitourinary: Negative for hematuria.   Musculoskeletal: Negative for gait problem.   Neurological: Positive for weakness. Negative for dizziness and syncope.   All other systems reviewed and are negative.      Vital signs in last 24 hours  Temp:  [36.1 °C (97 °F)-37 °C (98.6 °F)] 36.1 °C (97 °F)  Pulse:  [] 93  Resp:  [16-20] 18  BP: (101-131)/(64-83) 129/83  SpO2:  [92 %-97 %] 97 %    Physical Exam  Physical Exam  Constitutional:       General: He is not in acute distress.     Appearance: Normal appearance.   HENT:      Head: Normocephalic.   Eyes:      Extraocular Movements: Extraocular movements intact.   Neck:      Vascular: No carotid bruit.   Cardiovascular:      Rate and Rhythm: Normal rate and regular rhythm.      Pulses: Normal pulses.       Heart sounds: Normal heart sounds. No murmur heard.     Pulmonary:      Effort: Pulmonary effort is normal.      Breath sounds: Normal breath sounds.   Chest:      Comments: ICD site is uncomplicated without erosion, swelling or erythema.   Abdominal:      General: There is no distension.      Palpations: Abdomen is soft.      Tenderness: There is no abdominal tenderness.   Musculoskeletal:         General: No swelling.      Cervical back: Normal range of motion.      Right lower leg: No edema.      Left lower leg: No edema.   Skin:     General: Skin is warm and dry.   Neurological:      Mental Status: He is alert and oriented to person, place, and time.   Psychiatric:         Mood and Affect: Mood normal.         Thought Content: Thought content normal.         Judgment: Judgment normal.         Lab Review  Lab Results   Component Value Date/Time    WBC 7.4 11/02/2021 04:57 AM    RBC 3.96 (L) 11/02/2021 04:57 AM    HEMOGLOBIN 14.3 11/02/2021 04:57 AM    HEMATOCRIT 43.9 11/02/2021 04:57 AM    .9 (H) 11/02/2021 04:57 AM    MCH 36.1 (H) 11/02/2021 04:57 AM    MCHC 32.6 (L) 11/02/2021 04:57 AM    MPV 11.0 11/02/2021 04:57 AM      Lab Results   Component Value Date/Time    SODIUM 140 11/02/2021 04:57 AM    POTASSIUM 3.7 11/02/2021 04:57 AM    CHLORIDE 104 11/02/2021 04:57 AM    CO2 26 11/02/2021 04:57 AM    GLUCOSE 184 (H) 11/02/2021 04:57 AM    BUN 10 11/02/2021 04:57 AM    CREATININE 0.55 11/02/2021 04:57 AM      Lab Results   Component Value Date/Time    ASTSGOT 12 11/02/2021 04:57 AM    ALTSGPT 6 11/02/2021 04:57 AM     Lab Results   Component Value Date/Time    CHOLSTRLTOT 145 01/20/2021 09:32 AM    LDL 90 01/20/2021 09:32 AM    HDL 33 (A) 01/20/2021 09:32 AM    TRIGLYCERIDE 110 01/20/2021 09:32 AM    TROPONINT 18 10/31/2021 08:04 PM       No results for input(s): NTPROBNP in the last 72 hours.    Cardiac Imaging and Procedures Review  Limited TTE 5/29/21:  CONCLUSIONS  Severely dilated LV and severely  reduced LV and RVsystolic function.   There is global hypokinesis and apical akinesis. No evidence of LV   thrombus. LVEF estimated about 15-20%. No significant valuvlar   dysfunction. Compared to the images of the prior study done 1/2/2021   looks similar.     TTE 10/30/21:  CONCLUSIONS  Limited study to evaluate ICD wires, right atrium and right ventricle   for endocarditis.  Echodense partially mobile mass seen in the right atrium.  Difficult to assess if this is attached to the ICD wire.  No evidence of tricuspid valve endocarditis.  Small pericardial effusion without evidence of hemodynamic compromise.    PAMELA 10/30/21:  CONCLUSIONS  No evidence of valvular endocarditis.  Mobile mass seen in the right atrium and possibly in the right   ventricle, unclear if it is attached to the ICD lead.   Please refer to TTE for detailed study on right heart.       Assessment/Plan  MSSA Bacteremia:  - Possible mobile mass in right atrium consistent with endocarditis. It is recommended to have ICD system extracted, patient is scheduled to have this completed today at 11 am with Dr. Adin Neil.    - Will re-evaluate need for reimplantation in the future.   - ID following, appreciate recommendations.     PAF:  -First diagnosed in 2016 after CVA.   -Device interrogation in September showed no mode switching.   -OAC with Eliquis on hold pending ICD extraction.   -On digoxin 125 mcg daily.      SVT:  -Remains stable.      HTN:  -Remains well controlled.       Thank you for allowing me to participate in the care of this patient.    Please contact me with any questions.      CONNER Cano.   Cardiology, [facility]  [phone]

## 2021-11-04 NOTE — PROGRESS NOTES
Bedside report received from nurse. Assumed care of pt. Pt resting comfortably in bed. A/Ox4, VSS,  All needs met. POC reviewed and white board updated. Tele box on. Call light in reach. Bed locked in lowest position with 2 upper bed rails up. 8/10 abd pain. See MAR. Andres in place. Npo for AICD extraction later today.

## 2021-11-04 NOTE — DOCUMENTATION QUERY
"                                                                         [facility]                                                                       Query Response Note      PATIENT:               MARZENA ROMEO  ACCT #:                  [id number]  MRN:                     [id number]  :                      1950  ADMIT DATE:       2021 9:36 PM  DISCH DATE:          RESPONDING  PROVIDER #:        [id number]           QUERY TEXT:    There is conflicting documentation in the medical record. MSSA Bacteremia is documented in the Hospitalist Progress Notes, while MSSA Sepsis is documented in the Cardiac Electrophysiology Progress Notes.    Based on treatment, clinical findings and risk factors, can this documentation be further clarified?    The patient's Clinical Indicators include:  0/4 SIRS Criteria POA. \"MSSA bacteremia AICD in place . . . BCxs + MSSA 10/24 and 10/26 \" is documented in the Progress Notes on 11/3/21. \"MSSA sepsis.  Possible mobile mass in right atrium consistent with endocarditis.  Recommendation removal of system\" is documented in the Cardiac Electrophysiology Consult on 11/3/21.    Treatments include: Ancef and Cardiology Consult.    Risk factors include: dx Infective Endocarditis, Recent AICD Placement, hx DM II, and Advanced Age.  Options provided:   -- Sepsis 2/2 MSSA Bacteremia is ruled in, Please provide the source of infection, if known, and additional clinical indicators to support this diagnosis   -- MSSA Bacteremia without Sepsis is ruled in, Please provide the source of infection, if known   -- Unable to determine      Query created by: Ollie Orona on 2021 11:36 AM    RESPONSE TEXT:    MSSA Bacteremia without Sepsis is ruled in - endocarditis          Electronically signed by:  WESTLEY DICKERSON MD 2021 12:58 PM              "

## 2021-11-04 NOTE — PROGRESS NOTES
Hospital Medicine Daily Progress Note    Date of Service  11/4/2021     Chief Complaint  Francesco Schulte is a 71 y.o. male admitted 11/2/2021 with weakness    Hospital Course  70 yo man with DM, afib, BPH, CHF, AICD who admitted with dizziness and weakness concerning for stroke. MRI was negative and he was found with MSSA bacteremia. PAMELA showed mobile mass in right atrium. ID and cardiology were consulted.    Interval Problem Update  Sinus tachy on tele, HR 100s  He still has myalgias, generalized weakness, and a chronic cough since he's had COVID  ICD removal planned for today    I have personally seen and examined the patient at bedside. I discussed the plan of care with patient, charge RN,  and pharmacy.    Consultants/Specialty  cardiology and infectious disease    Code Status  Full Code    Disposition  Patient is not medically cleared.   Anticipate discharge to to home with close outpatient follow-up.  I have placed the appropriate orders for post-discharge needs.    Review of Systems  Review of Systems   Constitutional: Positive for malaise/fatigue.   Respiratory: Positive for cough and shortness of breath.    Cardiovascular: Positive for chest pain.   Gastrointestinal: Positive for abdominal pain and nausea.   Musculoskeletal: Positive for myalgias.   Neurological: Positive for weakness. Negative for dizziness.   All other systems reviewed and are negative.       Physical Exam  Temp:  [36.1 °C (97 °F)-37 °C (98.6 °F)] 36.8 °C (98.3 °F)  Pulse:  [] 81  Resp:  [15-20] 15  BP: (101-146)/(63-86) 111/63  SpO2:  [92 %-97 %] 95 %    Physical Exam  Vitals and nursing note reviewed.   Constitutional:       General: He is not in acute distress.     Appearance: He is not toxic-appearing.   HENT:      Head: Normocephalic.      Mouth/Throat:      Mouth: Mucous membranes are moist.   Eyes:      General:         Right eye: No discharge.         Left eye: No discharge.   Cardiovascular:      Rate  and Rhythm: Normal rate and regular rhythm.   Pulmonary:      Effort: Pulmonary effort is normal. No respiratory distress.      Breath sounds: No wheezing or rales.   Chest:      Chest wall: No tenderness.   Abdominal:      General: There is no distension.      Palpations: Abdomen is soft. There is no mass.      Tenderness: There is abdominal tenderness (LLQ mild tenderness with deep palpation). There is no guarding or rebound.   Musculoskeletal:         General: No swelling.      Cervical back: Neck supple.   Skin:     General: Skin is warm and dry.   Neurological:      Mental Status: He is alert and oriented to person, place, and time.         Fluids    Intake/Output Summary (Last 24 hours) at 11/4/2021 1702  Last data filed at 11/4/2021 0437  Gross per 24 hour   Intake 240 ml   Output 1370 ml   Net -1130 ml       Laboratory  Recent Labs     11/02/21  0457   WBC 7.4   RBC 3.96*   HEMOGLOBIN 14.3   HEMATOCRIT 43.9   .9*   MCH 36.1*   MCHC 32.6*   RDW 61.8*   PLATELETCT 270   MPV 11.0     Recent Labs     11/02/21  0457   SODIUM 140   POTASSIUM 3.7   CHLORIDE 104   CO2 26   GLUCOSE 184*   BUN 10   CREATININE 0.55   CALCIUM 8.6                   Imaging  DX-CHEST-PORTABLE (1 VIEW)   Final Result      1.  No pneumothorax following pacemaker removal.   2.  Interstitial edema. Possible small bilateral effusions.            CL-ICD,PM,BIV LEAD EXTRACTION W/ ANESTHESIA    (Results Pending)        Assessment/Plan  Acute bacterial endocarditis  Assessment & Plan  Per ID consult continue IV cefazolin 2 g every 8 hours  Anticipate 6 weeks IV abx from date of ICD removal if no additional positive blood cultures.    Patient states he has done IV antibiotics at home before    AICD (automatic cardioverter/defibrillator) present- (present on admission)  Assessment & Plan  Removed 11/4  Continue telemetry    Type 2 diabetes mellitus without complication, with long-term current use of insulin (HCC)- (present on  admission)  Assessment & Plan  Continue Lantus with regular insulin sliding scale before every meal as needed    Chronic systolic heart failure (HCC)- (present on admission)  Assessment & Plan  Continue digoxin and metop  He was previously on aldactone and did not tolerate ACEI/ARB  Monitor for now, f/u cardiology recs    Paroxysmal A-fib (HCC)- (present on admission)  Assessment & Plan  Currently in sinus rhythm, anticoagulation held for ICD removal       VTE prophylaxis: SCDs/TEDs    I have performed a physical exam and reviewed and updated ROS and Plan today (11/4/2021). In review of yesterday's note (11/3/2021), there are no changes except as documented above.

## 2021-11-05 ENCOUNTER — APPOINTMENT (OUTPATIENT)
Dept: CARDIOLOGY | Facility: MEDICAL CENTER | Age: 71
DRG: 261 | End: 2021-11-05
Attending: NURSE PRACTITIONER
Payer: MEDICARE

## 2021-11-05 ENCOUNTER — APPOINTMENT (OUTPATIENT)
Dept: RADIOLOGY | Facility: MEDICAL CENTER | Age: 71
DRG: 261 | End: 2021-11-05
Attending: INTERNAL MEDICINE
Payer: MEDICARE

## 2021-11-05 PROBLEM — R10.84 GENERALIZED ABDOMINAL PAIN: Status: ACTIVE | Noted: 2021-11-05

## 2021-11-05 LAB
ALBUMIN SERPL BCP-MCNC: 2.7 G/DL (ref 3.2–4.9)
ALP SERPL-CCNC: 105 U/L (ref 30–99)
ALT SERPL-CCNC: 7 U/L (ref 2–50)
ANION GAP SERPL CALC-SCNC: 8 MMOL/L (ref 7–16)
APPEARANCE UR: CLEAR
AST SERPL-CCNC: 24 U/L (ref 12–45)
BACTERIA #/AREA URNS HPF: NEGATIVE /HPF
BASOPHILS # BLD AUTO: 1.1 % (ref 0–1.8)
BASOPHILS # BLD: 0.1 K/UL (ref 0–0.12)
BILIRUB CONJ SERPL-MCNC: <0.2 MG/DL (ref 0.1–0.5)
BILIRUB INDIRECT SERPL-MCNC: ABNORMAL MG/DL (ref 0–1)
BILIRUB SERPL-MCNC: 0.5 MG/DL (ref 0.1–1.5)
BILIRUB UR QL STRIP.AUTO: NEGATIVE
BUN SERPL-MCNC: 12 MG/DL (ref 8–22)
CALCIUM SERPL-MCNC: 8.7 MG/DL (ref 8.5–10.5)
CHLORIDE SERPL-SCNC: 102 MMOL/L (ref 96–112)
CO2 SERPL-SCNC: 25 MMOL/L (ref 20–33)
COLOR UR: YELLOW
CREAT SERPL-MCNC: 0.63 MG/DL (ref 0.5–1.4)
EKG IMPRESSION: NORMAL
EOSINOPHIL # BLD AUTO: 0.14 K/UL (ref 0–0.51)
EOSINOPHIL NFR BLD: 1.5 % (ref 0–6.9)
EPI CELLS #/AREA URNS HPF: NEGATIVE /HPF
ERYTHROCYTE [DISTWIDTH] IN BLOOD BY AUTOMATED COUNT: 57 FL (ref 35.9–50)
GLUCOSE BLD-MCNC: 168 MG/DL (ref 65–99)
GLUCOSE BLD-MCNC: 169 MG/DL (ref 65–99)
GLUCOSE BLD-MCNC: 218 MG/DL (ref 65–99)
GLUCOSE BLD-MCNC: 314 MG/DL (ref 65–99)
GLUCOSE BLD-MCNC: 385 MG/DL (ref 65–99)
GLUCOSE SERPL-MCNC: 177 MG/DL (ref 65–99)
GLUCOSE UR STRIP.AUTO-MCNC: >=1000 MG/DL
HCT VFR BLD AUTO: 44.1 % (ref 42–52)
HGB BLD-MCNC: 14.6 G/DL (ref 14–18)
HYALINE CASTS #/AREA URNS LPF: ABNORMAL /LPF
IMM GRANULOCYTES # BLD AUTO: 0.13 K/UL (ref 0–0.11)
IMM GRANULOCYTES NFR BLD AUTO: 1.4 % (ref 0–0.9)
KETONES UR STRIP.AUTO-MCNC: NEGATIVE MG/DL
LEUKOCYTE ESTERASE UR QL STRIP.AUTO: ABNORMAL
LIPASE SERPL-CCNC: 21 U/L (ref 11–82)
LYMPHOCYTES # BLD AUTO: 0.54 K/UL (ref 1–4.8)
LYMPHOCYTES NFR BLD: 5.9 % (ref 22–41)
MCH RBC QN AUTO: 35.6 PG (ref 27–33)
MCHC RBC AUTO-ENTMCNC: 33.1 G/DL (ref 33.7–35.3)
MCV RBC AUTO: 107.6 FL (ref 81.4–97.8)
MICRO URNS: ABNORMAL
MONOCYTES # BLD AUTO: 0.67 K/UL (ref 0–0.85)
MONOCYTES NFR BLD AUTO: 7.3 % (ref 0–13.4)
NEUTROPHILS # BLD AUTO: 7.54 K/UL (ref 1.82–7.42)
NEUTROPHILS NFR BLD: 82.8 % (ref 44–72)
NITRITE UR QL STRIP.AUTO: NEGATIVE
NRBC # BLD AUTO: 0 K/UL
NRBC BLD-RTO: 0 /100 WBC
PH UR STRIP.AUTO: 7 [PH] (ref 5–8)
PLATELET # BLD AUTO: 376 K/UL (ref 164–446)
PMV BLD AUTO: 10.8 FL (ref 9–12.9)
POTASSIUM SERPL-SCNC: 3.7 MMOL/L (ref 3.6–5.5)
PROT SERPL-MCNC: 7.3 G/DL (ref 6–8.2)
PROT UR QL STRIP: 30 MG/DL
RBC # BLD AUTO: 4.1 M/UL (ref 4.7–6.1)
RBC # URNS HPF: ABNORMAL /HPF
RBC UR QL AUTO: ABNORMAL
SODIUM SERPL-SCNC: 135 MMOL/L (ref 135–145)
SP GR UR STRIP.AUTO: 1.02
UROBILINOGEN UR STRIP.AUTO-MCNC: 0.2 MG/DL
WBC # BLD AUTO: 9.1 K/UL (ref 4.8–10.8)
WBC #/AREA URNS HPF: ABNORMAL /HPF

## 2021-11-05 PROCEDURE — 93308 TTE F-UP OR LMTD: CPT | Mod: 26 | Performed by: INTERNAL MEDICINE

## 2021-11-05 PROCEDURE — 97162 PT EVAL MOD COMPLEX 30 MIN: CPT

## 2021-11-05 PROCEDURE — 99232 SBSQ HOSP IP/OBS MODERATE 35: CPT | Mod: 24 | Performed by: NURSE PRACTITIONER

## 2021-11-05 PROCEDURE — 81001 URINALYSIS AUTO W/SCOPE: CPT

## 2021-11-05 PROCEDURE — A9270 NON-COVERED ITEM OR SERVICE: HCPCS | Performed by: INTERNAL MEDICINE

## 2021-11-05 PROCEDURE — 99232 SBSQ HOSP IP/OBS MODERATE 35: CPT | Performed by: INTERNAL MEDICINE

## 2021-11-05 PROCEDURE — A9270 NON-COVERED ITEM OR SERVICE: HCPCS | Performed by: NURSE PRACTITIONER

## 2021-11-05 PROCEDURE — 700102 HCHG RX REV CODE 250 W/ 637 OVERRIDE(OP): Performed by: NURSE PRACTITIONER

## 2021-11-05 PROCEDURE — 700111 HCHG RX REV CODE 636 W/ 250 OVERRIDE (IP): Performed by: INTERNAL MEDICINE

## 2021-11-05 PROCEDURE — 97166 OT EVAL MOD COMPLEX 45 MIN: CPT

## 2021-11-05 PROCEDURE — 83690 ASSAY OF LIPASE: CPT

## 2021-11-05 PROCEDURE — 71045 X-RAY EXAM CHEST 1 VIEW: CPT

## 2021-11-05 PROCEDURE — 99233 SBSQ HOSP IP/OBS HIGH 50: CPT | Performed by: INTERNAL MEDICINE

## 2021-11-05 PROCEDURE — 80076 HEPATIC FUNCTION PANEL: CPT

## 2021-11-05 PROCEDURE — 770020 HCHG ROOM/CARE - TELE (206)

## 2021-11-05 PROCEDURE — 85025 COMPLETE CBC W/AUTO DIFF WBC: CPT

## 2021-11-05 PROCEDURE — 700102 HCHG RX REV CODE 250 W/ 637 OVERRIDE(OP): Performed by: INTERNAL MEDICINE

## 2021-11-05 PROCEDURE — 93010 ELECTROCARDIOGRAM REPORT: CPT | Performed by: INTERNAL MEDICINE

## 2021-11-05 PROCEDURE — 80048 BASIC METABOLIC PNL TOTAL CA: CPT

## 2021-11-05 PROCEDURE — 82962 GLUCOSE BLOOD TEST: CPT | Mod: 91

## 2021-11-05 PROCEDURE — 93005 ELECTROCARDIOGRAM TRACING: CPT | Performed by: INTERNAL MEDICINE

## 2021-11-05 PROCEDURE — 700101 HCHG RX REV CODE 250: Performed by: INTERNAL MEDICINE

## 2021-11-05 PROCEDURE — 36415 COLL VENOUS BLD VENIPUNCTURE: CPT

## 2021-11-05 PROCEDURE — 93308 TTE F-UP OR LMTD: CPT

## 2021-11-05 RX ORDER — OXYCODONE HYDROCHLORIDE 5 MG/1
5-10 TABLET ORAL
Status: DISCONTINUED | OUTPATIENT
Start: 2021-11-05 | End: 2021-11-09 | Stop reason: HOSPADM

## 2021-11-05 RX ORDER — HYDROMORPHONE HYDROCHLORIDE 1 MG/ML
0.5 INJECTION, SOLUTION INTRAMUSCULAR; INTRAVENOUS; SUBCUTANEOUS
Status: DISCONTINUED | OUTPATIENT
Start: 2021-11-05 | End: 2021-11-05

## 2021-11-05 RX ADMIN — HYDROMORPHONE HYDROCHLORIDE 0.25 MG: 1 INJECTION, SOLUTION INTRAMUSCULAR; INTRAVENOUS; SUBCUTANEOUS at 09:03

## 2021-11-05 RX ADMIN — INSULIN GLARGINE 6 UNITS: 100 INJECTION, SOLUTION SUBCUTANEOUS at 05:36

## 2021-11-05 RX ADMIN — FINASTERIDE 5 MG: 5 TABLET, FILM COATED ORAL at 05:21

## 2021-11-05 RX ADMIN — OXYCODONE 10 MG: 5 TABLET ORAL at 19:27

## 2021-11-05 RX ADMIN — LIDOCAINE 1 PATCH: 50 PATCH TOPICAL at 13:11

## 2021-11-05 RX ADMIN — CEFAZOLIN SODIUM 2 G: 2 INJECTION, SOLUTION INTRAVENOUS at 05:21

## 2021-11-05 RX ADMIN — INSULIN HUMAN 6 UNITS: 100 INJECTION, SOLUTION PARENTERAL at 17:34

## 2021-11-05 RX ADMIN — OXYCODONE 10 MG: 5 TABLET ORAL at 13:11

## 2021-11-05 RX ADMIN — OXYCODONE 5 MG: 5 TABLET ORAL at 08:12

## 2021-11-05 RX ADMIN — TAMSULOSIN HYDROCHLORIDE 0.4 MG: 0.4 CAPSULE ORAL at 08:12

## 2021-11-05 RX ADMIN — HYDROMORPHONE HYDROCHLORIDE 0.5 MG: 1 INJECTION, SOLUTION INTRAMUSCULAR; INTRAVENOUS; SUBCUTANEOUS at 15:10

## 2021-11-05 RX ADMIN — CEFAZOLIN SODIUM 2 G: 2 INJECTION, SOLUTION INTRAVENOUS at 21:47

## 2021-11-05 RX ADMIN — METOPROLOL SUCCINATE 25 MG: 25 TABLET, EXTENDED RELEASE ORAL at 05:22

## 2021-11-05 RX ADMIN — GUAIFENESIN 200 MG: 100 SOLUTION ORAL at 09:03

## 2021-11-05 RX ADMIN — OXYCODONE 5 MG: 5 TABLET ORAL at 05:21

## 2021-11-05 RX ADMIN — GUAIFENESIN 200 MG: 100 SOLUTION ORAL at 05:41

## 2021-11-05 RX ADMIN — OXYCODONE 5 MG: 5 TABLET ORAL at 00:47

## 2021-11-05 RX ADMIN — CEFAZOLIN SODIUM 2 G: 2 INJECTION, SOLUTION INTRAVENOUS at 13:12

## 2021-11-05 RX ADMIN — Medication 5 MG: at 21:47

## 2021-11-05 RX ADMIN — DIGOXIN 125 MCG: 125 TABLET ORAL at 17:35

## 2021-11-05 RX ADMIN — INSULIN HUMAN 3 UNITS: 100 INJECTION, SOLUTION PARENTERAL at 21:50

## 2021-11-05 RX ADMIN — APIXABAN 5 MG: 5 TABLET, FILM COATED ORAL at 17:35

## 2021-11-05 RX ADMIN — INSULIN HUMAN 8 UNITS: 100 INJECTION, SOLUTION PARENTERAL at 13:07

## 2021-11-05 ASSESSMENT — COGNITIVE AND FUNCTIONAL STATUS - GENERAL
SUGGESTED CMS G CODE MODIFIER DAILY ACTIVITY: CH
SUGGESTED CMS G CODE MODIFIER MOBILITY: CK
MOVING TO AND FROM BED TO CHAIR: A LITTLE
TURNING FROM BACK TO SIDE WHILE IN FLAT BAD: A LITTLE
CLIMB 3 TO 5 STEPS WITH RAILING: A LITTLE
STANDING UP FROM CHAIR USING ARMS: A LITTLE
WALKING IN HOSPITAL ROOM: A LITTLE
MOVING FROM LYING ON BACK TO SITTING ON SIDE OF FLAT BED: A LITTLE
MOBILITY SCORE: 18
DAILY ACTIVITIY SCORE: 24

## 2021-11-05 ASSESSMENT — PAIN DESCRIPTION - PAIN TYPE
TYPE: ACUTE PAIN;SURGICAL PAIN
TYPE: ACUTE PAIN
TYPE: ACUTE PAIN;SURGICAL PAIN
TYPE: ACUTE PAIN;SURGICAL PAIN
TYPE: ACUTE PAIN

## 2021-11-05 ASSESSMENT — ENCOUNTER SYMPTOMS
ABDOMINAL PAIN: 0
FATIGUE: 1
CHILLS: 0
WEAKNESS: 1
FEVER: 0
DIARRHEA: 0
DIZZINESS: 0
VOMITING: 0
MYALGIAS: 1
NAUSEA: 0
CONFUSION: 1
ABDOMINAL PAIN: 1
COUGH: 1
SHORTNESS OF BREATH: 1

## 2021-11-05 ASSESSMENT — CHA2DS2 SCORE
DIABETES: YES
AGE 65 TO 74: YES
AGE 75 OR GREATER: NO
CHF OR LEFT VENTRICULAR DYSFUNCTION: YES
CHA2DS2 VASC SCORE: 7
VASCULAR DISEASE: YES
SEX: MALE
PRIOR STROKE OR TIA OR THROMBOEMBOLISM: YES
HYPERTENSION: YES

## 2021-11-05 ASSESSMENT — GAIT ASSESSMENTS
ASSISTIVE DEVICE: FRONT WHEEL WALKER
GAIT LEVEL OF ASSIST: SUPERVISED
DISTANCE (FEET): 200
DEVIATION: DECREASED BASE OF SUPPORT;BRADYKINETIC

## 2021-11-05 ASSESSMENT — FIBROSIS 4 INDEX: FIB4 SCORE: 0.9

## 2021-11-05 ASSESSMENT — ACTIVITIES OF DAILY LIVING (ADL): TOILETING: INDEPENDENT

## 2021-11-05 NOTE — PROGRESS NOTES
Pt stood at EOB. C/o SOB and dizziness, desatting to 70% on RA. Placed on 6L Oxymask and back to bed. Negative for orthostatics. Pt recovered and now satting 95% on RA again.

## 2021-11-05 NOTE — PROGRESS NOTES
Cardiology Follow Up Progress Note    Date of Service  11/5/2021    Attending Physician  Rosanna Ramírez M.D.    Presented with MSSA bacteremia, PAMELA confirmed possible mobile mass in the right atrium consistent with endocarditis.    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   Cardiovascular: Positive for chest pain.        Briefly intermittent right-sided chest discomfort, worse with deep breathing    Patient was receiving Dilaudid, will discontinue Dilaudid   Psychiatric/Behavioral: Positive for confusion.       Vital signs in last 24 hours  Temp:  [36.1 °C (96.9 °F)-37 °C (98.6 °F)] 36.8 °C (98.2 °F)  Pulse:  [81-99] 99  Resp:  [15-24] 24  BP: (107-139)/(63-88) 107/74  SpO2:  [92 %-95 %] 93 %    Physical Exam  Physical Exam  Constitutional:       Comments: Confusion   Cardiovascular:      Rate and Rhythm: Normal rate and regular rhythm.   Pulmonary:      Breath sounds: No wheezing.   Skin:     General: Skin is warm.      Comments: Status post AICD extraction, 11/4/2011, site uncomplicated         Lab Review  Lab Results   Component Value Date/Time    WBC 9.1 11/05/2021 02:51 AM    RBC 4.10 (L) 11/05/2021 02:51 AM    HEMOGLOBIN 14.6 11/05/2021 02:51 AM    HEMATOCRIT 44.1  11/05/2021 02:51 AM    .6 (H) 11/05/2021 02:51 AM    MCH 35.6 (H) 11/05/2021 02:51 AM    MCHC 33.1 (L) 11/05/2021 02:51 AM    MPV 10.8 11/05/2021 02:51 AM      Lab Results   Component Value Date/Time    SODIUM 135 11/05/2021 02:51 AM    POTASSIUM 3.7 11/05/2021 02:51 AM    CHLORIDE 102 11/05/2021 02:51 AM    CO2 25 11/05/2021 02:51 AM    GLUCOSE 177 (H) 11/05/2021 02:51 AM    BUN 12 11/05/2021 02:51 AM    CREATININE 0.63 11/05/2021 02:51 AM      Lab Results   Component Value Date/Time    ASTSGOT 12 11/02/2021 04:57 AM    ALTSGPT 6 11/02/2021 04:57 AM     Lab Results   Component Value Date/Time    CHOLSTRLTOT 145 01/20/2021 09:32 AM    LDL 90 01/20/2021 09:32 AM    HDL 33 (A) 01/20/2021 09:32 AM    TRIGLYCERIDE 110 01/20/2021 09:32 AM    TROPONINT 18 10/31/2021 08:04 PM       No results for input(s): NTPROBNP in the last 72 hours.    Cardiac Imaging and Procedures Review  EKG: SR    Echocardiogram:    10/30/2021  Echo intense partially mobile mass at the right atrium    PAMELA 10/30/21:  CONCLUSIONS  No evidence of valvular endocarditis.  Mobile mass seen in the right atrium and possibly in the right   ventricle, unclear if it is attached to the ICD lead.   Please refer to TTE for detailed study on right heart.        EP procedure note:  11/4/2021  Successful dual-chamber defibrillator system removal, [place] model.    Imaging  Chest X-Ray:  Faint bibasilar opacities consistent with atelectasis and/or interstitial edema.     2.  Small bilateral pleural effusions.    Stress Test:  n/a    Assessment/Plan    MSSA bacteremia  -Appreciate ID  -Continue with Ancef through 12/15/2021, plan for repeat echo at the end of the therapy  -PAMELA 10/30/2021: Mobile mass seen in the right atrium, possibly in the right ventricle, mildly present at present ICD lead  -Underwent successful dual-chamber defibrillator system removal, [place] model, 11/4/2021  -Follow-up appointment with our cardiology office 11/18/2021 to evaluate need  for reimplantation as an outpatient in the future.    Right-sided chest discomfort  -Chest x-ray, no pneumothorax  -EKG, no acute changes  -Limited echo, pending  -Discontinue Dilaudid    PAF  Diagnosed 2016 after CVA  -Currently in sinus rhythm  -On digoxin  -Toprol-XL 25  -Resume Eliquis    Ischemic cardiomyopathy, LVEF 20%  History of ST elevation anterior MI, 3/11/2017 with PCI/OSWALD proximal LAD mid LAD.  2008 - PCI to the LAD, POBA to the diagonal.   2014 - anterior STEMI. POBA to the diagonal. PCI to the proximal circumflex.  Restented again in 2014 after his Plavix was stopped for an injection and patient has recurrent MI.  2016 - recurrent MI. POBA to unknown vessel  -on Toprol-XL 25  -Digoxin 125 MCG  -Reportedly patient was not able to tolerate ACE/ARB due to dizziness and severe hypotension.    Lupus  -Diagnosed 2 years ago    Cardiology will follow along  Follow-up on limited echo to sandra about that is like all my questions so much workout effusion    Thank you for allowing me to participate in the care of this patient.    Please contact me with any questions.    ELAINE Cary.   Cardiologist, [facility]  [phone]

## 2021-11-05 NOTE — PROGRESS NOTES
"Hospital Medicine Daily Progress Note    Date of Service  11/5/2021     Chief Complaint  Francesco Schulte is a 71 y.o. male admitted 11/2/2021 with weakness    Hospital Course  70 yo man with DM, afib, BPH, CHF, AICD who admitted with dizziness and weakness concerning for stroke. MRI was negative and he was found with MSSA bacteremia. PAMELA showed mobile mass in right atrium. ID and cardiology were consulted.    Interval Problem Update  Sinus on tele  This morning he complained of severe chest pain since his procedure yesterday. Though he says he had a \"good 4 to 5 hours of sleep\" last night. During my exam he continued to express chest pain but was pointing to his abdomen. He does complain of painful urination. He denies radiation of the pain. Pain is worse with deep breath. He says his SOB and cough from his prior COVID infection is unchanged. He denies N/V/D.    I have personally seen and examined the patient at bedside. I discussed the plan of care with patient, charge RN,  and pharmacy.    Consultants/Specialty  cardiology and infectious disease    Code Status  Full Code    Disposition  Patient is not medically cleared.   Anticipate discharge to to home with close outpatient follow-up.  I have placed the appropriate orders for post-discharge needs.    Review of Systems  Review of Systems   Constitutional: Positive for malaise/fatigue.   Respiratory: Positive for cough (chronic, mild) and shortness of breath (chronic, mild).    Cardiovascular: Positive for chest pain.   Gastrointestinal: Positive for abdominal pain. Negative for nausea.   Genitourinary: Positive for dysuria.   Musculoskeletal: Positive for myalgias.   Neurological: Positive for weakness. Negative for dizziness.   All other systems reviewed and are negative.       Physical Exam  Temp:  [36.1 °C (96.9 °F)-37 °C (98.6 °F)] 37 °C (98.6 °F)  Pulse:  [81-99] 84  Resp:  [15-24] 20  BP: (107-139)/(63-88) 122/71  SpO2:  [92 %-95 %] 92 " %    Physical Exam  Vitals and nursing note reviewed.   Constitutional:       General: He is not in acute distress.     Appearance: He is not toxic-appearing.   HENT:      Head: Normocephalic.      Mouth/Throat:      Mouth: Mucous membranes are moist.   Eyes:      General:         Right eye: No discharge.         Left eye: No discharge.   Cardiovascular:      Rate and Rhythm: Normal rate and regular rhythm.   Pulmonary:      Effort: Pulmonary effort is normal. No respiratory distress.      Breath sounds: No wheezing or rales.      Comments: Wound dressings over right upper chest, no crepitus  Chest:      Chest wall: Tenderness present.   Abdominal:      General: There is no distension.      Palpations: Abdomen is soft. There is no mass.      Tenderness: There is abdominal tenderness (suprapubic). There is no guarding or rebound.   Musculoskeletal:         General: Swelling present.      Cervical back: Neck supple.   Skin:     General: Skin is warm and dry.   Neurological:      Mental Status: He is alert and oriented to person, place, and time.         Fluids    Intake/Output Summary (Last 24 hours) at 11/5/2021 1603  Last data filed at 11/5/2021 0541  Gross per 24 hour   Intake --   Output 2000 ml   Net -2000 ml       Laboratory  Recent Labs     11/05/21  0251   WBC 9.1   RBC 4.10*   HEMOGLOBIN 14.6   HEMATOCRIT 44.1   .6*   MCH 35.6*   MCHC 33.1*   RDW 57.0*   PLATELETCT 376   MPV 10.8     Recent Labs     11/05/21  0251   SODIUM 135   POTASSIUM 3.7   CHLORIDE 102   CO2 25   GLUCOSE 177*   BUN 12   CREATININE 0.63   CALCIUM 8.7                   Imaging  DX-CHEST-PORTABLE (1 VIEW)   Final Result      1.  Faint bibasilar opacities consistent with atelectasis and/or interstitial edema.      2.  Small bilateral pleural effusions.      DX-CHEST-PORTABLE (1 VIEW)   Final Result      1.  No pneumothorax following pacemaker removal.   2.  Interstitial edema. Possible small bilateral effusions.             CL-ICD,PM,BIV LEAD EXTRACTION W/ ANESTHESIA    (Results Pending)   EC-ECHOCARDIOGRAM LTD W/O CONT    (Results Pending)   IR-PICC LINE PLACEMENT W/ GUIDANCE > AGE 5    (Results Pending)        Assessment/Plan  * Acute bacterial endocarditis  Assessment & Plan  continue IV cefazolin 2 g every 8 hours  On discharge, will plan IV Ancef 6 g every 24 hours through 12/15/2021 per ID  Weekly CBC and CMP  Repeat TTE prior to stopping antibiotic  PICC ordered  HH ordered    Generalized abdominal pain  Assessment & Plan  Patient has chest pain, then found with abdominal pain  CXR and EKG did not show acute findings, he appears to be recovering from his ICD removal  UA does not show clear infection  LFTs and lipase are normal  He also had recent multiple MRIs that were unrevealing  He is not constipated  Continue supportive care  Andres cath removal today, voiding trial    AICD (automatic cardioverter/defibrillator) present- (present on admission)  Assessment & Plan  Removed 11/4  Continue telemetry    Type 2 diabetes mellitus without complication, with long-term current use of insulin (HCC)- (present on admission)  Assessment & Plan  Continue Lantus with regular insulin sliding scale before every meal as needed    Chronic systolic heart failure (HCC)- (present on admission)  Assessment & Plan  Continue digoxin and metop  He was previously on aldactone and did not tolerate ACEI/ARB  Monitor for now, f/u cardiology recs    Paroxysmal A-fib (HCC)- (present on admission)  Assessment & Plan  Currently in sinus rhythm  Restarted on Eliquis  Continue metop       VTE prophylaxis: Eliquis    I have performed a physical exam and reviewed and updated ROS and Plan today (11/5/2021). In review of yesterday's note (11/4/2021), there are no changes except as documented above.

## 2021-11-05 NOTE — PROGRESS NOTES
12 hr cc          Monitor summary:  SR-ST   With rare PVC, occasional PAC, rare couplet  .17/.12/.34

## 2021-11-05 NOTE — PROGRESS NOTES
Report received at bedside, pt care assumed, tele box on. Pt aaox4 on RA, no signs of distress noted at this time. Patient resting comfortably in bed. POC discussed with pt and verbalizes no questions. Pt denies any additional needs at this time. Bed in lowest position, pt educated on fall risk and verbalized understanding, call light within reach, bed alarm plugged in and on. Will continue hourly rounding.     Crisis charting COVID 19 surge in effect

## 2021-11-05 NOTE — CARE PLAN
The patient is Watcher - Medium risk of patient condition declining or worsening    Shift Goals  Clinical Goals: AICD placement  Patient Goals: pain management  Family Goals: KAL. No family present.     Progress made toward(s) clinical / shift goals:   Problem: Knowledge Deficit - Standard  Goal: Patient and family/care givers will demonstrate understanding of plan of care, disease process/condition, diagnostic tests and medications  Outcome: Progressing, Patient is educated of disease process and condition. Treatment team has included patient in plan of care. All medications indications and side effects are explained. Patient is encouraged to ask questions. Patient indicates understanding.      Problem: Pain - Standard  Goal: Alleviation of pain or a reduction in pain to the patient’s comfort goal  Outcome: Progressing, Patient educated on pain rating scale, pain control management, nonpharmacologic methods of pain management and reaching a tolerable level. Pain medication provided PRN in addition to nonpharmacologic methods. Patient verbalizes understanding of teaching and has no additional questions at this time.      Problem: Fall Risk  Goal: Patient will remain free from falls  Outcome: Progressing, Patient's risk for injury and falls assessed. Appropriate safety precautions in place. Patient educated to utilize call light for needs. Patient verbalizes understanding.        Patient is not progressing towards the following goals:

## 2021-11-05 NOTE — FACE TO FACE
Face to Face Supporting Documentation - Home Health    The encounter with this patient was in whole or in part the primary reason for home health admission.    Date of encounter:   Patient:                    MRN:                       YOB: 2019  Francesco Schulte  [id number]  1950     Home health to see patient for:  Skilled Nursing care for assessment, interventions & education and Physical Therapy evaluation and treatment    Skilled need for:  Comment: bacteremia, endocarditis    Skilled nursing interventions to include:  Venous access care, Home IV infusion therapy and Comment: draw CBC and CMP weekly while on antibiotic, physical therapy    Homebound status evidenced by:  Needs the assistance of another person in order to leave the home. Leaving home requires a considerable and taxing effort. There is a normal inability to leave the home.    Community Physician to provide follow up care: ALVINO Harman     Optional Interventions? No      I certify the face to face encounter for this home health care referral meets the CMS requirements and the encounter/clinical assessment with the patient was, in whole, or in part, for the medical condition(s) listed above, which is the primary reason for home health care. Based on my clinical findings: the service(s) are medically necessary, support the need for home health care, and the homebound criteria are met.  I certify that this patient has had a face to face encounter by myself.  Rosanna Ramírez M.D. - NPI: [id number]

## 2021-11-05 NOTE — DISCHARGE PLANNING
Good morning and thank you for sending [facility] this referral,  Per orders patient will be receiving home infusions through [facility] however we still do not have an order from [facility].  Referral continues to be on hold pending receipt of [facility] infusion orders.

## 2021-11-05 NOTE — PROGRESS NOTES
"Infectious Disease Progress Note    Author: Randy Becerra M.D. Date & Time of service: 2021  11:27 AM    Chief Complaint:  Follow-up for MSSA bacteremia    Interval History:  71 y.o. diabetic male admitted 10/24/2021 for weakness, dizziness, palpitations.+ AICD, Afib on apixaban, BPH, CVA found to have new CVA and above  10/26 AF WBC more alert today-still has LE weakness and can't walk  10/27 AF WBC 4.9 repeat blood cxs +staph No new complaints ECHO not done yet  10/28 AF sleeping soundly at time of rounds No echo yet  10/29 AF WBC 4.7 c/o back pain today-not controlled with meds  10/30 AF WBC not done plan for Echo today. Episode chest pain note, pleuritic. EKG no ischemia  10/31 AF WBC  8.2 TTE with thrombus RA-PAMELA pending Urinary retention noted-new Andres  Attributes retention to \"they haven't been giving me my pee pills\"   patient remains afebrile, white count is 7.4, tolerating Ancef thus far.  11/3 patient transferred to [facility] for ICD extraction, remains afebrile, no CBC this morning. Resting comfortably this morning.   patient remains afebrile, white count 9.1, tolerating Ancef.  ICD extraction performed on     Labs Reviewed and Medications Reviewed.    Review of Systems:  Review of Systems   Unable to perform ROS: Other   Constitutional: Negative for chills and fever.   Cardiovascular: Positive for chest pain.   Gastrointestinal: Negative for abdominal pain, diarrhea, nausea and vomiting.   Skin: Negative for itching and rash.   All other systems reviewed and are negative.    Hemodynamics:  Temp (24hrs), Av.6 °C (97.9 °F), Min:36.1 °C (96.9 °F), Max:37 °C (98.6 °F)  Temperature: 36.8 °C (98.2 °F)  Pulse  Av.8  Min: 79  Max: 106   Blood Pressure : 107/74       Physical Exam:  Physical Exam  Vitals and nursing note reviewed.   Constitutional:       General: He is not in acute distress.     Appearance: He is not toxic-appearing or diaphoretic.   HENT:      Head: " Normocephalic and atraumatic.      Mouth/Throat:      Mouth: Mucous membranes are dry.      Pharynx: Oropharynx is clear.   Eyes:      General: No scleral icterus.        Right eye: No discharge.         Left eye: No discharge.      Conjunctiva/sclera: Conjunctivae normal.   Cardiovascular:      Rate and Rhythm: Normal rate. Rhythm irregular.   Pulmonary:      Effort: Pulmonary effort is normal. No respiratory distress.      Comments: AICD site with clean and dry dressing in place  Abdominal:      General: Bowel sounds are normal. There is no distension.      Tenderness: There is no abdominal tenderness.   Musculoskeletal:         General: No swelling or tenderness.   Skin:     Findings: No erythema or rash.   Neurological:      General: No focal deficit present.      Mental Status: He is alert and oriented to person, place, and time.   Psychiatric:         Mood and Affect: Mood normal.         Behavior: Behavior normal.         Meds:    Current Facility-Administered Medications:   •  oxyCODONE immediate-release **OR** HYDROmorphone  •  insulin regular **AND** POC blood glucose manual result **AND** NOTIFY MD and PharmD **AND** glucose **AND** dextrose 50%  •  enalaprilat  •  labetalol  •  senna-docusate **AND** polyethylene glycol/lytes **AND** magnesium hydroxide **AND** bisacodyl  •  Pharmacy Consult Request  •  acetaminophen  •  ceFAZolin  •  digoxin  •  finasteride  •  guaiFENesin  •  hyoscyamine-lidocaine-Maalox (GI Cocktail)  •  insulin glargine  •  lidocaine  •  LORazepam  •  meclizine  •  melatonin  •  metoprolol SR  •  ondansetron  •  tamsulosin    Labs:  Recent Labs     11/05/21  0251   WBC 9.1   RBC 4.10*   HEMOGLOBIN 14.6   HEMATOCRIT 44.1   .6*   MCH 35.6*   RDW 57.0*   PLATELETCT 376   MPV 10.8   NEUTSPOLYS 82.80*   LYMPHOCYTES 5.90*   MONOCYTES 7.30   EOSINOPHILS 1.50   BASOPHILS 1.10     Recent Labs     11/05/21  0251   SODIUM 135   POTASSIUM 3.7   CHLORIDE 102   CO2 25   GLUCOSE 177*   BUN  12     Recent Labs     11/05/21  0251   CREATININE 0.63       Imaging:  CT-CTA HEAD WITH & W/O-POST PROCESS    Result Date: 10/25/2021  10/24/2021 9:03 PM HISTORY/REASON FOR EXAM:  Dizziness, dehydration or hypotension; vertigo new onset, vasculopath TECHNIQUE/EXAM DESCRIPTION: CT angiogram of the [place] without and with contrast.  Initial precontrast images were obtained of the head from the skull base through the vertex.  Postcontrast images were obtained of the [place] following the power injection of nonionic contrast at 5.0 mL/sec. Thin-section helical images were obtained with overlapping reconstruction interval. Coronal and sagittal multiplanar volume reformats were generated.  3D angiographic images were reviewed on PACS.  Maximum intensity projection (MIP) images were generated and reviewed. 80 mL of Omnipaque 350 nonionic contrast was injected intravenously. Low dose optimization technique was utilized for this CT exam including automated exposure control and adjustment of the mA and/or kV according to patient size. COMPARISON:  August 9, 2018. FINDINGS: The posterior circulation shows the distal vertebral arteries to be patent. Hypoplastic distal left vertebral artery is seen. Atherosclerotic changes in the distal right vertebral artery are seen, resulting in less than 50% stenosis The vertebrobasilar confluence is intact. The basilar artery is patent. Right proximal P2 occlusion is seen which reconstitutes distally. Persistent fetal morphology of the left posterior cerebral artery is seen. Atherosclerotic changes are seen of the bilateral intracranial internal carotid arteries resulting in less than 50% stenosis, otherwise the anterior circulation shows no stenotic or occlusive lesion. No aneurysm is evident about the [place]. Mild diffuse parenchymal volume loss is seen. There is mild periventricular and subcortical white matter low-attenuation changes. Low-density changes in  the right parietal occipital lobe are seen compatible with encephalomalacia. 3D angiographic/MIP images of the vasculature confirm the vascular findings as described above.     1.  Right proximal P2 occlusion, reconstitutes distally 2.  Changes of atrophy with nonspecific white matter changes, most commonly associated with small vessel ischemia. These findings were discussed with the patient's clinician, Dr. Farooq, on 10/24/2021 10:12 PM.    CT-CTA NECK WITH & W/O-POST PROCESSING    Result Date: 10/24/2021    10/24/2021 9:03 PM HISTORY/REASON FOR EXAM:  Vertigo new onset, vasculopath TECHNIQUE/EXAM DESCRIPTION:  CT angiogram of the neck with contrast. Postcontrast images were obtained of the neck from the great vessels through the skull base following the power injection of nonionic contrast at 5.0 mL/sec. Thin-section helical images were obtained with overlapping reconstruction interval. Coronal and oblique multiplanar volume reformats were generated. Cervical internal carotid artery percent stenosis is calculated using the standard method according to the NASCET criteria wherein a segment of uniform caliber mid or distal cervical internal carotid is used as the reference denominator. 3D angiographic images were reviewed on PACS.  Maximum intensity projection (MIP) images were generated and reviewed 80 mL of Omnipaque 350 nonionic contrast was injected intravenously. Low dose optimization technique was utilized for this CT exam including automated exposure control and adjustment of the mA and/or kV according to patient size. COMPARISON: August 9, 2018 FINDINGS: 1.4 cm peripherally calcified left thyroid cyst is seen. Aortic arch: bovine type branching pattern. There is atherosclerotic plaque of the aorta. Right common carotid artery: Patent Right internal carotid artery: Atherosclerotic plaque without significant stenosis (less than 50%). Left common carotid artery is patent. Left internal carotid artery:  Atherosclerotic plaque without significant stenosis (less than 50%). The right vertebral artery is patent without dissection or stenosis. The left vertebral artery is patent without dissection or stenosis. Vertebrobasilar confluence: The vertebrobasilar confluence appears normal. Lung apices are clear The soft tissues of the neck are within normal limits. 3D angiographic/MIP images of the vasculature confirm the vascular findings as described above.     1.  No high-grade stenosis, occlusion, or aneurysm appreciated.     DX-CHEST-PORTABLE (1 VIEW)    Result Date: 10/31/2021  10/31/2021 1:54 PM HISTORY/REASON FOR EXAM:  Chest Pain TECHNIQUE/EXAM DESCRIPTION AND NUMBER OF VIEWS: Single portable view of the chest. COMPARISON: 10/29/2021 FINDINGS: Pacemaker is unchanged in appearance. The mediastinal and cardiac silhouette is mildly enlarged. Central vessels are engorged. There is minimal lower lobe atelectasis. There is a trace of bilateral pleural fluid. There is no visible pneumothorax. There are no acute bony abnormalities.     1.  There are changes of vascular congestion/edema.    DX-CHEST-PORTABLE (1 VIEW)    Result Date: 10/29/2021  10/29/2021 5:03 PM HISTORY/REASON FOR EXAM:  Chest Pain. TECHNIQUE/EXAM DESCRIPTION AND NUMBER OF VIEWS: Single portable view of the chest. COMPARISON: October 24, 2021 FINDINGS: Cardiac silhouette is enlarged. Dual-lead left-sided pacemaker is present. There is bibasilar atelectasis with pleural fluid. There is mild interstitial prominence suggesting mild edema. No pneumothorax identified.     Enlarged cardiac silhouette and interstitial prominence of pleural fluid suggesting congestive heart failure.    DX-CHEST-PORTABLE (1 VIEW)    Result Date: 10/24/2021  10/24/2021 5:30 PM HISTORY/REASON FOR EXAM:  Chest Pain TECHNIQUE/EXAM DESCRIPTION AND NUMBER OF VIEWS: Single portable view of the chest. COMPARISON:  7/29/2021, 5/28/2021 FINDINGS: Left bipolar transvenous pacing device remains  in place. The cardiac silhouette is within normal limits. No infiltrates or consolidations are noted. No pleural effusion is noted.     No acute cardiac or pulmonary abnormality is noted.    [id number]-P/O    Result Date: 10/26/2021  10/26/2021 6:57 PM HISTORY/REASON FOR EXAM:  History of cholelithiasis. TECHNIQUE/EXAM DESCRIPTION: Magnetic resonance cholangiopancreatography. Magnetic resonance cholangiopancreatography was performed on with axial, coronal, and sagittal thin and thick section heavily T2-weighted sequences. 3-D cholangiographic multiplanar maximum intensity projection (MIP) images were created on a workstation.. The study was performed on a [employer] 1.5 Antonina MRI scanner. COMPARISON: Ultrasound 5/30/2021 FINDINGS: Images are somewhat limited by technique. Gallbladder: Small dependent gallstones are again seen. There is no pericholecystic fluid or gallbladder wall thickening. Biliary tree: Normal in caliber without choledocholithiasis or obstructive mass. Pancreas: There is no evidence of a solid pancreatic mass. There is a questionable cystic area in the splenic hilum/pancreatic tail region measuring 2 x 2 centimeters. Pancreatic duct is normal in caliber and course. Liver: There are no focal signal abnormalities. Spleen: Enlarged measuring 16.4 cm in length. Adrenal glands: There are no nodules. Kidneys: There are bilateral simple renal cysts. There is no hydronephrosis. Nodes: There is no abdominal adenopathy. Ascites: None. There is minimal degenerative change in spine.     1.  There is cholelithiasis without pericholecystic, gallbladder wall thickening or biliary dilatation. 2.  There is no choledocholithiasis. 3.  There is a potential 2 cm cystic lesion in the pancreatic tail region/splenic hilum.    MR-BRAIN-W/O    Result Date: 10/26/2021  10/26/2021 6:41 PM HISTORY/REASON FOR EXAM:  Stroke, follow up. Altered mental status. Weakness. Possible sepsis, concern for stroke. TECHNIQUE/EXAM  DESCRIPTION: MRI of the brain without contrast. T1 sagittal, T2 fast spin-echo axial, T1 coronal, FLAIR coronal, Diffusion weighted and Apparent Diffusion Coefficient (ADC map) axial images were obtained of the whole brain. Additional FLAIR axial images were obtained. The study was performed on a [employer] 1.5 Antonina MRI scanner. COMPARISON:  MRI brain 3/11/2018, head CT and CTA of the head 10/24/2021 FINDINGS: The calvariae are unremarkable.  There are no extra-axial fluid collections. There is a pattern of mild cerebral atrophy manifest as prominence of sulcal markings over the convexities and vertex along with mild ventriculomegaly. Again seen is a moderate-sized area of chronic cortical and subcortical encephalomalacic change in the right lateral occipital lobe extending up into the right posterior parietal lobe consistent with old infarction in the territory of the right PCA. Also  again seen are small foci of chronic encephalomalacia about the central sulcus involving the precentral gyrus in the frontal lobe and postcentral gyrus in the right parietal lobe (FLAIR axial images 22-25). These are consistent with old infarcts. No change from prior exam. There is also a small curvilinear area of encephalomalacia in the right anterior parasagittal frontal lobe extending into the right anterior frontal deep white matter. This may represent old infarction versus chronic sequela of posttraumatic brain contusion. This has become more conspicuous since the prior exam. There is some mildly bright diffusion signal but this likely represents T2 shine through as the lesion is well demarcated on the T2-weighted images. There is also a small area of chronic encephalomalacic change at the inferior surface of the left temporal lobe involving the inferior temporal gyrus. (FLAIR axial images 19-21, series a and FLAIR axial images 11, 12, series 7). This lesion was not present on the prior exam and the location is most suggestive  of posttraumatic brain contusion. There are no mass effects or shift of midline structures.  There are no hemorrhagic lesions.  The diffusion weighted axial images show no evidence of acute cerebral infarction. The brainstem and posterior fossa structures are unremarkable. Vascular flow voids in the vertebrobasilar and carotid arteries, [place], and dural venous sinuses are intact. The distal right vertebral artery is dominant. There is a diminutive vertebro-basilar system consistent with normal variation likely associated with carotid origin of one or both posterior cerebral arteries. The paranasal sinuses in the field of view are unremarkable. The mastoids are clear.     1.  Mild cerebral atrophy. 2.  Chronic encephalomalacic changes right lateral occipital lobe, right parietal lobe, right posterior frontal lobe most consistent with old cerebral infarcts. 3.  Small area of chronic encephalomalacic change at the right anterior parasagittal frontal lobe which has developed since the prior exam. This may represent an old infarction versus chronic sequela of posttraumatic brain contusion. 4.  Small area of chronic encephalomalacic change at the inferior surface of the left temporal lobe which was not present on the prior exam from 2018. Lesion location and morphology is most suggestive of chronic posttraumatic brain contusion. 5.  No evidence of acute infarction, acute hemorrhage, or mass lesion.    MR-CERVICAL SPINE-WITH & W/O    Result Date: 11/2/2021 11/1/2021 10:34 AM HISTORY/REASON FOR EXAM:  Neck pain, acute, infection suspected TECHNIQUE/EXAM DESCRIPTION: MRI of the cervical spine without and with contrast. The study was performed on a [employer] 1.5 Antonina MRI scanner. T1 sagittal, T2 fast spin-echo sagittal, T1 postcontrast fat-suppressed sagittal, and gradient-echo axial images were obtained of the cervical spine. 18 mL ProHance contrast were administered  intravenously. COMPARISON:  CTA of the neck  10/24/2021 FINDINGS:  Intervertebral fusion across C4-C5 and C6-C7 is noted. Included portion of the posterior fossa is within normal limits. The craniocervical junction is normal. On STIR images, there is heterogeneous marrow signal noted within all the cervical vertebral bodies, probably related to type I marrow changes without definite endplate edema. Spinal cord signal intensity is grossly within normal limits to the study is degraded by motion artifact. Cervical spine levels: C2-3: Mild bilateral facet degeneration. No significant canal stenosis or foraminal narrowing C3-4:  Broad-based disc bulge and endplate disc osteophyte complex with bilateral uncovertebral and facet degeneration results in mild right foraminal narrowing. There is no significant canal stenosis. C4-5:  Fused segment. No canal or foraminal narrowing. C5-6: Broad-based endplate disc osteophyte complex encroaches upon the spinal canal. There is bilateral advanced uncovertebral and facet degeneration resulting in moderate right foraminal narrowing. There is moderate canal narrowing at this level. There is mild left foraminal narrowing. C6-7: Fused segment. No stenosis. C7-T1: Mild endplate disc osteophyte complex, eccentrically more prominent along the left paracentral region. There is mild right foraminal narrowing. There is moderate left foraminal narrowing. Postcontrast images through the cervical spine do not demonstrate any definite abnormal enhancement in the vertebral bodies or in the epidural space. No obvious prevertebral or paravertebral enhancement is identified. The pre and paravertebral soft tissues are grossly within normal limits on the noncontrast T2 and gradient-echo images.     1.  Degenerative changes in the cervical spine as described above with moderate canal narrowing at C5-6 and moderate right foraminal narrowing. 2.  No definite cord signal abnormality is identified, however the study is limited by motion artifact. 3.   Within the limitations imposed by motion artifact, there is no evidence of abnormal marrow signal, prevertebral or paravertebral phlegmon or epidural enhancement to suggest an infection.    MR-LUMBAR SPINE-WITH & W/O    Result Date: 11/2/2021 11/1/2021 10:34 AM HISTORY/REASON FOR EXAM:  Low back pain, infection suspected TECHNIQUE/EXAM DESCRIPTION: MRI of the lumbar spine without and with contrast. The study was performed on a [employer] 1.5 Antonina MRI scanner. T1 sagittal, T2 fast spin-echo sagittal, and T2 axial images were obtained of the lumbar spine. T1 postcontrast fat-suppressed sagittal images were obtained. 18 mL ProHance contrast was administered intravenously. COMPARISON:  None. FINDINGS: Lumbar spine alignment is within normal limits. Vertebral body heights are preserved. Intervertebral disc heights are within normal limits. Bone marrow signal intensity is within normal limits. T12-L1: Mild facet degeneration. No stenosis. L1-2: Mild facet degeneration without stenosis. L2-3: Minimal facet degeneration. No stenosis. L3-4: Minimal facet degeneration. No stenosis. Neuroforamina are normal. L4-5: Broad-based disc bulge and bilateral facet degeneration. There is mild right foraminal narrowing without definite exiting nerve impingement. There is mild lateral recess narrowing at this level. L5-S1: Mild facet degeneration. No stenosis. Bilateral renal cystlike lesions are present. Postcontrast images through the lumbar spine do not demonstrate any abnormal enhancement. Minimal enhancement along the right-sided facet joint at L4-5 is likely related to synovial hypertrophy. No adjacent soft tissue swelling is identified. Visualized prevertebral soft tissues are without obvious abnormality.     Mild lumbar spine degenerative changes without significant spinal canal stenosis. There is mild right foraminal narrowing at L4-5 without definite exiting nerve root impingement. No abnormal enhancement is identified in the  lumbar spine.     MR-THORACIC SPINE-WITH & W/O    Result Date: 11/2/2021 11/1/2021 10:34 AM HISTORY/REASON FOR EXAM:  infection suspected TECHNIQUE/EXAM DESCRIPTION: MRI of the thoracic spine without and with contrast. The study was performed on a [employer] 1.5 Antonina MRI scanner. T1 sagittal, T2 fast spin-echo sagittal, and T2 axial images were obtained of the thoracic spine. T1 post-contrast fat suppressed sagittal images were obtained. Optional T1 post-contrast axial images may be obtained. 18 mL ProHance contrast was administered intravenously. COMPARISON:  None. FINDINGS: Thoracic spine alignment is within normal limits. Vertebral body heights are preserved. A small hemangioma is noted at T10. Spinal cord signal throughout the thoracic cord is within normal limits. Thoracic neural foramina are within normal limits. There is no significant canal narrowing or foraminal stenosis in the thoracic spine. A small left paracentral protrusion is noted at the T7-T8 level without significant encroachment upon the spinal canal. The pre and paravertebral soft tissues are within normal limits. There is probably bilateral pleural effusion.     No significant abnormality of the thoracic spine. There is no abnormal cord signal or abnormal enhancement.    EC-ECHOCARDIOGRAM LTD W/O CONT    Result Date: 10/30/2021  Transthoracic Echo Report Echocardiography Laboratory CONCLUSIONS Limited study to evaluate ICD wires, right atrium and right ventricle for endocarditis. Echodense partially mobile mass seen in the right atrium. Difficult to assess if this is attached to the ICD wire. No evidence of tricuspid valve endocarditis. Small pericardial effusion without evidence of hemodynamic compromise. MARZENA ROMEO Exam Date:         10/30/2021                    10:31 Exam Location:     Inpatient Priority:          Routine Ordering Physician:        FALLON RASMUSSEN Referring Physician: Sonographer:               Lisandra Arellano RUST  Age:    71     Gender:    M MRN:    [id number] :    1950 BSA:    2.15   Ht (in):    75     Wt (lb):    190 Exam Type:     Limited Indications:     Acute and subacute endocarditis, unspecified ICD Codes:       I33.9 CPT Codes:       56210, 13382, 39746 BP:   140    /   73     HR: Technical Quality:       Fair MEASUREMENTS  (Male / Female) Normal Values 2D ECHO LV Ejection Fraction MOD 4C       49.2 %                DOPPLER TR Peak Velocity                  238 cm/s              * Indicates values subject to auto-interpretation LV EF:        % FINDINGS Left Ventricle Right Ventricle Normal right ventricular size and systolic function. Right Atrium Echo bright semi mobile density seen in right atrium. Difficult to assess if this is attached to ICD wires. Left Atrium Mitral Valve Aortic Valve Tricuspid Valve Structurally normal tricuspid valve. No tricuspid stenosis. Trace tricuspid regurgitation. Pulmonic Valve Pericardium Small pericardial effusion without evidence of hemodynamic compromise. Aorta Beau Gutierrez MD (Electronically Signed) Final Date:     2021                 17:40    EC-PAMELA W/O CONT    Result Date: 10/30/2021  Results Will be Available after Interpretation by Cardiologist.      Micro:  Results     Procedure Component Value Units Date/Time    URINALYSIS [[id number]]     Order Status: No result Specimen: Urine           Assessment:/Plan:  MSSA bacteremia  AICD in place  Right atrial mass  BCxs + MSSA 10/24 and 10/26  Repeat Bcxs 10/28 and 10/30 neg  TTE with large right atrial mass  PAMELA  with no valvular vegetations.  The mobile mass seen in the right atrium and also possibly in the right ventricle, unclear if attached to ICD lead  Explantation of AICD performed on   Continue IV cefazolin 2 g every 8 hours  On discharge, recommend continuous infusion IV Ancef 6 g every 24 hours through 12/15/2021  At least weekly CBC with differential, CMP while on IV antibiotics  Recommend repeat TTE  at end of therapy.  Risk for embolization of the mass remains  Paper home infusion orders faxed to  on 11/5     New back pain  Bilateral shoulder/arm pain  MRI C,T, and L spine with no evidence of vertebral osteomyelitis or epidural abscess  MRI of the abdomen with no obvious evidence of seeding of infection     CVA  Mult CVA by MRI, chronic  No bleed     Immunosuppressed  PV  Lupus     Diabetes  Hemoglobin A1c was 7.4, may contribute to severity of infection     Discussed Dr. Ramírez.  ID will sign off.  Please call with questions.     ID clinic follow-up in 4 to 8 weeks
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
## 2021-11-05 NOTE — THERAPY
Physical Therapy   Initial Evaluation     Patient Name: Francesco Schulte  Age:  71 y.o., Sex:  male  Medical Record #: [id number]  Today's Date: 11/5/2021     Precautions  Precautions: Fall Risk    Assessment  Patient is 71 y.o. male who presented acutely from [facility] with concern for endocarditis, positive MSSA blood cultures, and PAMELA with possible mobile mass in R atrium vs ICS wire.  Patient is now s/p AICD removal.  PMH includes DM2, DLP, HFrEF, and A fib.  Today patient was able to perform all mobility with SPV and extension of time.  Patient takes long rest breaks between tasks & c/o SOB though spO2 maintained >90% throughout session.  Patient argumentative and insistent on doing the tasks he wanted to do.  Patient was able to ambulate ~200 ft with FWW and SPV with narrow MIGUEL ÁNGEL and shuffled gait.  Recommend home health PT.    Plan    Recommend Physical Therapy for Evaluation only. Patient will not be actively followed for physical therapy services at this time, however may be seen if requested by physician for 1 more visit within 30 days to address any discharge or equipment needs.    DC Equipment Recommendations: None (Pt owns FWW)  Discharge Recommendations: Recommend home health for continued physical therapy services       Objective       11/05/21 1037   Prior Living Situation   Prior Services Home-Independent   Housing / Facility 1 Story House   Steps Into Home 0   Equipment Owned Front-Wheel Walker;Single Point Cane   Lives with - Patient's Self Care Capacity Spouse   Prior Level of Functional Mobility   Bed Mobility Independent   Transfer Status Independent   Ambulation Independent   Assistive Devices Used None   Stairs Independent   Cognition    Cognition / Consciousness X   Speech/ Communication Delayed Responses   Level of Consciousness Alert   Safety Awareness Impaired   New Learning Impaired   Attention Impaired   Comments Pt w/ delayed responses, increased time to respone & participate.  Pt  argumentative at times and focused on having COVID.     Active ROM Lower Body    Active ROM Lower Body  WDL   Strength Lower Body   Lower Body Strength  WDL   Sensation Lower Body   Lower Extremity Sensation   WDL   Balance Assessment   Sitting Balance (Static) Fair +   Sitting Balance (Dynamic) Fair +   Standing Balance (Static) Fair   Standing Balance (Dynamic) Fair   Weight Shift Standing Fair   Comments w/ FWW, no LOB   Gait Analysis   Gait Level Of Assist Supervised   Assistive Device Front Wheel Walker   Distance (Feet) 200   # of Times Distance was Traveled 1   Deviation Decreased Base Of Support;Bradykinetic   # of Stairs Climbed 0   Weight Bearing Status No restrictions   Bed Mobility    Supine to Sit Supervised   Sit to Supine   (Up in chair post session)   Scooting Supervised  (seated)   Rolling Supervised   Comments HOB slightly elevated   Functional Mobility   Sit to Stand Supervised   Bed, Chair, Wheelchair Transfer Supervised   Toilet Transfers Supervised   Transfer Method Stand Step   Activity Tolerance   Sitting in Chair 10+ min (post session)   Sitting Edge of Bed 3 min   Standing 15 min total   Comments c/o SOB   Session Information   Date / Session Number  11/5 - 1x only, DC needs

## 2021-11-05 NOTE — ASSESSMENT & PLAN NOTE
Continue digoxin and metop  He was previously on aldactone and did not tolerate ACEI/ARB  Monitor for now, f/u cardiology recs  
Currently in sinus rhythm  Restarted on Eliquis  Continue metop  
His pain has been present since his admission at [facility]. Seems to be migratory across his chest and abdomen.  CXR and EKG did not show acute findings. TTE showed minimal pleural effusion. He does not have complications from his ICD removal  UA does not show clear infection. His pain did not improve with go cath removal.  He also had recent multiple MRIs of his abd and spine that were unremarkable. He does have cholelithiasis but his pain is mostly left sided. His LFTs and lipase were normal.  He is not constipated nor have diarrhea  He has no tachycardia, tachypnea, or hypoxia to suggest PE. He is on therapeutic Eliquis  He does not have pain after eating. Suspicion for mesenteric ischemia is low. I will check a lactic acid  Will trial PPI and carafate for acid reflux  He does have significant chest tenderness on exam, possible MSK pain. He has lidocaine patch ordered. Continue opioid PRN.  His pain is already improved with tylenol and robaxin, suspect his pain was MSK  
Hyperglycemic, increase glargine to 10U daily  ISS  
Removed 11/4  Continue telemetry  
continue IV cefazolin 2 g every 8 hours  On discharge, will plan IV Ancef 6 g every 24 hours through 12/15/2021 per ID  Weekly CBC and CMP  Repeat TTE prior to stopping antibiotic  PICC placed  HH ordered  
Total Laparoscopic Hysterectomy
scheduled surgery

## 2021-11-06 ENCOUNTER — APPOINTMENT (OUTPATIENT)
Dept: RADIOLOGY | Facility: MEDICAL CENTER | Age: 71
DRG: 261 | End: 2021-11-06
Attending: INTERNAL MEDICINE
Payer: MEDICARE

## 2021-11-06 LAB
ALBUMIN SERPL BCP-MCNC: 2.8 G/DL (ref 3.2–4.9)
ALBUMIN/GLOB SERPL: 0.6 G/DL
ALP SERPL-CCNC: 104 U/L (ref 30–99)
ALT SERPL-CCNC: 8 U/L (ref 2–50)
ANION GAP SERPL CALC-SCNC: 9 MMOL/L (ref 7–16)
AST SERPL-CCNC: 13 U/L (ref 12–45)
BASOPHILS # BLD AUTO: 1.1 % (ref 0–1.8)
BASOPHILS # BLD: 0.1 K/UL (ref 0–0.12)
BILIRUB SERPL-MCNC: 0.5 MG/DL (ref 0.1–1.5)
BUN SERPL-MCNC: 14 MG/DL (ref 8–22)
CALCIUM SERPL-MCNC: 8.6 MG/DL (ref 8.5–10.5)
CHLORIDE SERPL-SCNC: 100 MMOL/L (ref 96–112)
CO2 SERPL-SCNC: 25 MMOL/L (ref 20–33)
CREAT SERPL-MCNC: 0.72 MG/DL (ref 0.5–1.4)
EOSINOPHIL # BLD AUTO: 0.14 K/UL (ref 0–0.51)
EOSINOPHIL NFR BLD: 1.5 % (ref 0–6.9)
ERYTHROCYTE [DISTWIDTH] IN BLOOD BY AUTOMATED COUNT: 55.8 FL (ref 35.9–50)
GLOBULIN SER CALC-MCNC: 4.5 G/DL (ref 1.9–3.5)
GLUCOSE BLD-MCNC: 173 MG/DL (ref 65–99)
GLUCOSE BLD-MCNC: 224 MG/DL (ref 65–99)
GLUCOSE BLD-MCNC: 237 MG/DL (ref 65–99)
GLUCOSE BLD-MCNC: 269 MG/DL (ref 65–99)
GLUCOSE BLD-MCNC: 283 MG/DL (ref 65–99)
GLUCOSE SERPL-MCNC: 183 MG/DL (ref 65–99)
HCT VFR BLD AUTO: 43.2 % (ref 42–52)
HGB BLD-MCNC: 14.5 G/DL (ref 14–18)
IMM GRANULOCYTES # BLD AUTO: 0.16 K/UL (ref 0–0.11)
IMM GRANULOCYTES NFR BLD AUTO: 1.8 % (ref 0–0.9)
LYMPHOCYTES # BLD AUTO: 0.52 K/UL (ref 1–4.8)
LYMPHOCYTES NFR BLD: 5.7 % (ref 22–41)
MCH RBC QN AUTO: 36 PG (ref 27–33)
MCHC RBC AUTO-ENTMCNC: 33.6 G/DL (ref 33.7–35.3)
MCV RBC AUTO: 107.2 FL (ref 81.4–97.8)
MONOCYTES # BLD AUTO: 0.68 K/UL (ref 0–0.85)
MONOCYTES NFR BLD AUTO: 7.5 % (ref 0–13.4)
NEUTROPHILS # BLD AUTO: 7.49 K/UL (ref 1.82–7.42)
NEUTROPHILS NFR BLD: 82.4 % (ref 44–72)
NRBC # BLD AUTO: 0 K/UL
NRBC BLD-RTO: 0 /100 WBC
PLATELET # BLD AUTO: 367 K/UL (ref 164–446)
PMV BLD AUTO: 10.6 FL (ref 9–12.9)
POTASSIUM SERPL-SCNC: 3.6 MMOL/L (ref 3.6–5.5)
PROT SERPL-MCNC: 7.3 G/DL (ref 6–8.2)
RBC # BLD AUTO: 4.03 M/UL (ref 4.7–6.1)
SODIUM SERPL-SCNC: 134 MMOL/L (ref 135–145)
WBC # BLD AUTO: 9.1 K/UL (ref 4.8–10.8)

## 2021-11-06 PROCEDURE — 99233 SBSQ HOSP IP/OBS HIGH 50: CPT | Performed by: INTERNAL MEDICINE

## 2021-11-06 PROCEDURE — 700101 HCHG RX REV CODE 250: Performed by: INTERNAL MEDICINE

## 2021-11-06 PROCEDURE — 85025 COMPLETE CBC W/AUTO DIFF WBC: CPT

## 2021-11-06 PROCEDURE — 36415 COLL VENOUS BLD VENIPUNCTURE: CPT

## 2021-11-06 PROCEDURE — 700111 HCHG RX REV CODE 636 W/ 250 OVERRIDE (IP): Performed by: INTERNAL MEDICINE

## 2021-11-06 PROCEDURE — 36573 INSJ PICC RS&I 5 YR+: CPT

## 2021-11-06 PROCEDURE — A9270 NON-COVERED ITEM OR SERVICE: HCPCS | Performed by: NURSE PRACTITIONER

## 2021-11-06 PROCEDURE — 700102 HCHG RX REV CODE 250 W/ 637 OVERRIDE(OP): Performed by: INTERNAL MEDICINE

## 2021-11-06 PROCEDURE — 770020 HCHG ROOM/CARE - TELE (206)

## 2021-11-06 PROCEDURE — 82962 GLUCOSE BLOOD TEST: CPT | Mod: 91

## 2021-11-06 PROCEDURE — 99232 SBSQ HOSP IP/OBS MODERATE 35: CPT | Mod: 24 | Performed by: NURSE PRACTITIONER

## 2021-11-06 PROCEDURE — 80053 COMPREHEN METABOLIC PANEL: CPT

## 2021-11-06 PROCEDURE — 700102 HCHG RX REV CODE 250 W/ 637 OVERRIDE(OP): Performed by: NURSE PRACTITIONER

## 2021-11-06 PROCEDURE — 02HV33Z INSERTION OF INFUSION DEVICE INTO SUPERIOR VENA CAVA, PERCUTANEOUS APPROACH: ICD-10-PCS | Performed by: INTERNAL MEDICINE

## 2021-11-06 PROCEDURE — A9270 NON-COVERED ITEM OR SERVICE: HCPCS | Performed by: INTERNAL MEDICINE

## 2021-11-06 RX ORDER — HYDROXYUREA 500 MG/1
500 CAPSULE ORAL 2 TIMES DAILY
Status: DISCONTINUED | OUTPATIENT
Start: 2021-11-06 | End: 2021-11-06

## 2021-11-06 RX ORDER — ACETAMINOPHEN 500 MG
1000 TABLET ORAL EVERY 8 HOURS
Status: DISCONTINUED | OUTPATIENT
Start: 2021-11-06 | End: 2021-11-09 | Stop reason: HOSPADM

## 2021-11-06 RX ORDER — OMEPRAZOLE 20 MG/1
20 CAPSULE, DELAYED RELEASE ORAL DAILY
Status: DISCONTINUED | OUTPATIENT
Start: 2021-11-06 | End: 2021-11-09 | Stop reason: HOSPADM

## 2021-11-06 RX ORDER — LIDOCAINE 50 MG/G
2 PATCH TOPICAL EVERY 24 HOURS
Status: DISCONTINUED | OUTPATIENT
Start: 2021-11-06 | End: 2021-11-09 | Stop reason: HOSPADM

## 2021-11-06 RX ORDER — SUCRALFATE ORAL 1 G/10ML
1 SUSPENSION ORAL EVERY 6 HOURS
Status: DISCONTINUED | OUTPATIENT
Start: 2021-11-06 | End: 2021-11-09 | Stop reason: HOSPADM

## 2021-11-06 RX ORDER — METHOCARBAMOL 500 MG/1
500 TABLET, FILM COATED ORAL EVERY 8 HOURS
Status: DISCONTINUED | OUTPATIENT
Start: 2021-11-06 | End: 2021-11-09 | Stop reason: HOSPADM

## 2021-11-06 RX ORDER — HYDROMORPHONE HYDROCHLORIDE 1 MG/ML
0.5 INJECTION, SOLUTION INTRAMUSCULAR; INTRAVENOUS; SUBCUTANEOUS ONCE
Status: COMPLETED | OUTPATIENT
Start: 2021-11-06 | End: 2021-11-06

## 2021-11-06 RX ADMIN — TAMSULOSIN HYDROCHLORIDE 0.4 MG: 0.4 CAPSULE ORAL at 09:55

## 2021-11-06 RX ADMIN — APIXABAN 5 MG: 5 TABLET, FILM COATED ORAL at 05:03

## 2021-11-06 RX ADMIN — METHOCARBAMOL 500 MG: 500 TABLET ORAL at 21:06

## 2021-11-06 RX ADMIN — ACETAMINOPHEN 1000 MG: 500 TABLET ORAL at 21:06

## 2021-11-06 RX ADMIN — CEFAZOLIN SODIUM 2 G: 2 INJECTION, SOLUTION INTRAVENOUS at 21:06

## 2021-11-06 RX ADMIN — HYDROMORPHONE HYDROCHLORIDE 0.5 MG: 1 INJECTION, SOLUTION INTRAMUSCULAR; INTRAVENOUS; SUBCUTANEOUS at 09:55

## 2021-11-06 RX ADMIN — SUCRALFATE 1 G: 1 SUSPENSION ORAL at 18:31

## 2021-11-06 RX ADMIN — APIXABAN 5 MG: 5 TABLET, FILM COATED ORAL at 18:31

## 2021-11-06 RX ADMIN — OXYCODONE 10 MG: 5 TABLET ORAL at 05:03

## 2021-11-06 RX ADMIN — GUAIFENESIN 200 MG: 100 SOLUTION ORAL at 13:58

## 2021-11-06 RX ADMIN — INSULIN HUMAN 3 UNITS: 100 INJECTION, SOLUTION PARENTERAL at 18:36

## 2021-11-06 RX ADMIN — CEFAZOLIN SODIUM 2 G: 2 INJECTION, SOLUTION INTRAVENOUS at 05:06

## 2021-11-06 RX ADMIN — SUCRALFATE 1 G: 1 SUSPENSION ORAL at 13:43

## 2021-11-06 RX ADMIN — METHOCARBAMOL 500 MG: 500 TABLET ORAL at 14:52

## 2021-11-06 RX ADMIN — GUAIFENESIN 200 MG: 100 SOLUTION ORAL at 21:06

## 2021-11-06 RX ADMIN — SUCRALFATE 1 G: 1 SUSPENSION ORAL at 09:54

## 2021-11-06 RX ADMIN — INSULIN GLARGINE 6 UNITS: 100 INJECTION, SOLUTION SUBCUTANEOUS at 05:03

## 2021-11-06 RX ADMIN — SUCRALFATE 1 G: 1 SUSPENSION ORAL at 23:07

## 2021-11-06 RX ADMIN — DIGOXIN 125 MCG: 125 TABLET ORAL at 18:31

## 2021-11-06 RX ADMIN — LIDOCAINE 1 PATCH: 50 PATCH TOPICAL at 13:43

## 2021-11-06 RX ADMIN — SENNOSIDES AND DOCUSATE SODIUM 2 TABLET: 50; 8.6 TABLET ORAL at 13:58

## 2021-11-06 RX ADMIN — INSULIN HUMAN 5 UNITS: 100 INJECTION, SOLUTION PARENTERAL at 19:59

## 2021-11-06 RX ADMIN — ACETAMINOPHEN 1000 MG: 500 TABLET ORAL at 14:53

## 2021-11-06 RX ADMIN — CEFAZOLIN SODIUM 2 G: 2 INJECTION, SOLUTION INTRAVENOUS at 13:44

## 2021-11-06 RX ADMIN — OXYCODONE 10 MG: 5 TABLET ORAL at 19:59

## 2021-11-06 RX ADMIN — OXYCODONE 10 MG: 5 TABLET ORAL at 13:58

## 2021-11-06 RX ADMIN — INSULIN HUMAN 5 UNITS: 100 INJECTION, SOLUTION PARENTERAL at 13:44

## 2021-11-06 RX ADMIN — Medication 5 MG: at 19:59

## 2021-11-06 RX ADMIN — OMEPRAZOLE 20 MG: 20 CAPSULE, DELAYED RELEASE ORAL at 09:55

## 2021-11-06 RX ADMIN — FINASTERIDE 5 MG: 5 TABLET, FILM COATED ORAL at 05:03

## 2021-11-06 RX ADMIN — GUAIFENESIN 200 MG: 100 SOLUTION ORAL at 05:03

## 2021-11-06 RX ADMIN — METOPROLOL SUCCINATE 25 MG: 25 TABLET, EXTENDED RELEASE ORAL at 05:06

## 2021-11-06 ASSESSMENT — ENCOUNTER SYMPTOMS
FLANK PAIN: 0
ABDOMINAL PAIN: 1
CONFUSION: 0
NAUSEA: 0
BACK PAIN: 0
DIZZINESS: 0
FATIGUE: 1
CONSTIPATION: 0
SHORTNESS OF BREATH: 1
WEAKNESS: 1
VOMITING: 0
DIARRHEA: 0
COUGH: 1

## 2021-11-06 ASSESSMENT — PAIN DESCRIPTION - PAIN TYPE
TYPE: ACUTE PAIN

## 2021-11-06 NOTE — PROGRESS NOTES
Hospital Medicine Daily Progress Note    Date of Service  11/6/2021     Chief Complaint  Francesco Schulte is a 71 y.o. male admitted 11/2/2021 with weakness    Hospital Course  72 yo man with DM, afib, BPH, CHF, AICD who admitted with dizziness and weakness concerning for stroke. MRI was negative and he was found with MSSA bacteremia. PAMELA showed mobile mass in right atrium. ID and cardiology were consulted. His ICD was removed 11/4. PICC was placed to continue ancef until 12/15/21.     Interval Problem Update  Sinus on tele  Go cath was replaced for urinary retention  He continues to have chest pain. He says it's intermittent sharp pains that is worse with deep breathing that will appear out of nothing. At times it his left lower chest, sometimes it's LLQ of his abdomen. This has been ongoing even before his ICD removal and has been to his right chest and right side of abdomen. It never radiates to his back. Otherwise pleuritic pain with deep, he has no other associated symptoms. No pain after eating. It did not feel better with go cath removed. He had a normal BM yesterday.  He was able to ambulate with PT and was cleared for home health.    I have personally seen and examined the patient at bedside. I discussed the plan of care with patient, charge RN,  and pharmacy.    Consultants/Specialty  cardiology and infectious disease    Code Status  Full Code    Disposition  Patient is not medically cleared.   Anticipate discharge to to home with close outpatient follow-up.  I have placed the appropriate orders for post-discharge needs.    Review of Systems  Review of Systems   Constitutional: Positive for malaise/fatigue.   Respiratory: Positive for cough (chronic, mild) and shortness of breath (chronic, mild).    Cardiovascular: Positive for chest pain.   Gastrointestinal: Positive for abdominal pain. Negative for constipation, diarrhea, nausea and vomiting.   Genitourinary: Positive for  dysuria. Negative for flank pain.   Musculoskeletal: Negative for back pain.   Neurological: Positive for weakness. Negative for dizziness.   All other systems reviewed and are negative.       Physical Exam  Temp:  [36.2 °C (97.2 °F)-37 °C (98.6 °F)] 36.3 °C (97.4 °F)  Pulse:  [63-89] 80  Resp:  [16-20] 16  BP: (122-147)/(71-92) 128/74  SpO2:  [91 %-93 %] 92 %    Physical Exam  Vitals and nursing note reviewed.   Constitutional:       General: He is not in acute distress.     Appearance: He is not toxic-appearing.   HENT:      Head: Normocephalic.      Mouth/Throat:      Mouth: Mucous membranes are moist.   Eyes:      General:         Right eye: No discharge.         Left eye: No discharge.   Cardiovascular:      Rate and Rhythm: Normal rate and regular rhythm.   Pulmonary:      Effort: Pulmonary effort is normal. No respiratory distress.      Breath sounds: No wheezing or rales.      Comments: Wound dressings over left upper chest, no crepitus, no bruising seen  Chest:      Chest wall: Tenderness (left lower ribs) present.   Abdominal:      General: There is no distension.      Palpations: Abdomen is soft. There is no mass.      Tenderness: There is abdominal tenderness (LLQ). There is no guarding or rebound.   Musculoskeletal:         General: Swelling present.      Cervical back: Neck supple.   Skin:     General: Skin is warm and dry.   Neurological:      Mental Status: He is alert and oriented to person, place, and time.         Fluids    Intake/Output Summary (Last 24 hours) at 11/6/2021 1450  Last data filed at 11/6/2021 1324  Gross per 24 hour   Intake 120 ml   Output 2050 ml   Net -1930 ml       Laboratory  Recent Labs     11/05/21 0251 11/06/21  0322   WBC 9.1 9.1   RBC 4.10* 4.03*   HEMOGLOBIN 14.6 14.5   HEMATOCRIT 44.1 43.2   .6* 107.2*   MCH 35.6* 36.0*   MCHC 33.1* 33.6*   RDW 57.0* 55.8*   PLATELETCT 376 367   MPV 10.8 10.6     Recent Labs     11/05/21  0251 11/06/21  0322   SODIUM 135 134*    POTASSIUM 3.7 3.6   CHLORIDE 102 100   CO2 25 25   GLUCOSE 177* 183*   BUN 12 14   CREATININE 0.63 0.72   CALCIUM 8.7 8.6                   Imaging  IR-PICC LINE PLACEMENT W/ GUIDANCE > AGE 5   Final Result                  Ultrasound-guided PICC placement performed by qualified nursing staff as    above.          EC-ECHOCARDIOGRAM LTD W/O CONT   Final Result      DX-CHEST-PORTABLE (1 VIEW)   Final Result      1.  Faint bibasilar opacities consistent with atelectasis and/or interstitial edema.      2.  Small bilateral pleural effusions.      DX-CHEST-PORTABLE (1 VIEW)   Final Result      1.  No pneumothorax following pacemaker removal.   2.  Interstitial edema. Possible small bilateral effusions.            CL-ICD,PM,BIV LEAD EXTRACTION W/ ANESTHESIA    (Results Pending)        Assessment/Plan  * Acute bacterial endocarditis  Assessment & Plan  continue IV cefazolin 2 g every 8 hours  On discharge, will plan IV Ancef 6 g every 24 hours through 12/15/2021 per ID  Weekly CBC and CMP  Repeat TTE prior to stopping antibiotic  PICC placed  HH ordered    Generalized abdominal pain  Assessment & Plan  His pain has been present since his admission at [facility]. Seems to be migratory across his chest and abdomen.  CXR and EKG did not show acute findings. TTE showed minimal pleural effusion. He does not have complications from his ICD removal  UA does not show clear infection. His pain did not improve with go cath removal.  He also had recent multiple MRIs of his abd and spine that were unremarkable. He does have cholelithiasis but his pain is mostly left sided. His LFTs and lipase were normal.  He is not constipated nor have diarrhea  He has no tachycardia, tachypnea, or hypoxia to suggest PE. He is on therapeutic Eliquis  He does not have pain after eating. Suspicion for mesenteric ischemia is low. I will check a lactic acid  Will trial PPI and carafate for acid reflux  He does have significant chest tenderness on  exam, possible MSK pain. He has lidocaine patch ordered. Continue opioid PRN. Add scheduled tylenol and robaxin.  He does have h/o polycythemia vera, his CBC is wnl. Will continue his hydroxyurea  He does have history of lupus, possible lupus flare. Check anti-dsDNA, C3 and C4    AICD (automatic cardioverter/defibrillator) present- (present on admission)  Assessment & Plan  Removed 11/4  Continue telemetry    Type 2 diabetes mellitus without complication, with long-term current use of insulin (HCC)- (present on admission)  Assessment & Plan  Continue Lantus with regular insulin sliding scale before every meal as needed    Chronic systolic heart failure (HCC)- (present on admission)  Assessment & Plan  Continue digoxin and metop  He was previously on aldactone and did not tolerate ACEI/ARB  Monitor for now, f/u cardiology recs    Paroxysmal A-fib (HCC)- (present on admission)  Assessment & Plan  Currently in sinus rhythm  Restarted on Eliquis  Continue metop       VTE prophylaxis: Eliquis    I have performed a physical exam and reviewed and updated ROS and Plan today (11/6/2021). In review of yesterday's note (11/5/2021), there are no changes except as documented above.

## 2021-11-06 NOTE — PROCEDURES
Vascular Access Team     Date of Insertion: 11/6/21  Arm Circumference: 229.5  Internal length: 39  External Length: 0  Vein Occupancy %: 45   Reason for PICC: antibiotic therapy   Labs: WBC 9.1, , BUN 14, Cr 0.72, GFR >60, INR na     Consents confirmed, vessel patency confirmed with ultrasound. Risks and benefits of procedure explained to patient and education regarding central line associated bloodstream infections provided. Questions answered.      PICC placed in RUE per licensed provider order with ultrasound guidance.  4 Fr, single lumen PICC placed in cephalic vein after 1 attempt(s). 2 mL of 1% lidocaine injected intradermally at the insertion site. A 21 gauge microintroducer needle and modified Seldinger technique was used to obtain access to the vein. 39 cm catheter inserted and brisk blood return was observed from each lumen upon aspiration. Line secured at the 0 cm marker. TCS stylet removed and observed to be fully intact. Each lumen flushed using pulsatile method without resistance with 10 mL 0.9% normal saline. PICC line secured with Biopatch and Tegaderm.     PICC tip placement location is confirmed by nurse to be in the Superior Vena Cava (SVC) utilizing 3CG technology. PICC line is appropriate for use at this time. Patient tolerated procedure well, without complications.  Patient condition relayed to primary RN or ordering physician via this post procedure note in the EMR.      Ultrasound images uploaded to PACS and viewable in the EMR - yes  Ultrasound imaged printed and placed in paper chart - no     BARD Power PICC ref # [id number], Lot # [id number], Expiration Date 10/31/2022

## 2021-11-06 NOTE — PROGRESS NOTES
"Patient called this RN into room stating he is having \"attacks every 5-10 minutes\". This Rn asked the patient to describe what that means in more detail. Pt stats he is unable to pee and the pain is \"unbearable\". Pain medication was given. Pt stats he \"wants the go back and will not deal with this all night\" Bladder scan was complete, patient had 254ml retained. MD Barth notified. Orders to place the go again. Will notify day shift.   "

## 2021-11-06 NOTE — PROGRESS NOTES
Cardiology Follow Up Progress Note    Date of Service  11/6/2021    Attending Physician  Rosanna Ramírez M.D.    Presented with MSSA bacteremia, PAMELA confirmed possible mobile mass in the right atrium consistent with endocarditis.    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  Reports sharp pain to left-sided abdomen lasts 4 to 5 seconds.  MR abdomen 10/26/2021 showing cholelithiasis without pericholecystic, gallbladder wall thickening or biliary dilatation, no choledocholithiasis.  Repeat limited echo 11/5/2021 showing small predominantly posterior pericardial effusion without evidence of hemodynamic compromise.  EKG with no acute changes.  Continue with supportive therapy    Review of Systems  Review of Systems   Constitutional: Positive for fatigue.   Cardiovascular: Negative for chest pain.   Psychiatric/Behavioral: Negative for confusion.       Vital signs in last 24 hours  Temp:  [36.2 °C (97.2 °F)-37 °C (98.6 °F)] 36.2 °C (97.2 °F)  Pulse:  [63-99] 85  Resp:  [16-24] 17  BP: (107-147)/(71-92) 147/92  SpO2:  [91 %-93 %] 92 %    Physical Exam  Physical Exam  Constitutional:       Comments: Confusion   Cardiovascular:      Rate and Rhythm: Normal rate and regular rhythm.   Pulmonary:      Breath sounds: No wheezing.   Skin:     General: Skin is warm.      Comments: Status post AICD extraction, 11/4/2011, site uncomplicated         Lab Review  Lab Results   Component Value Date/Time    WBC 9.1 11/06/2021 03:22 AM    RBC 4.03 (L) 11/06/2021 03:22 AM    HEMOGLOBIN  14.5 11/06/2021 03:22 AM    HEMATOCRIT 43.2 11/06/2021 03:22 AM    .2 (H) 11/06/2021 03:22 AM    MCH 36.0 (H) 11/06/2021 03:22 AM    MCHC 33.6 (L) 11/06/2021 03:22 AM    MPV 10.6 11/06/2021 03:22 AM      Lab Results   Component Value Date/Time    SODIUM 134 (L) 11/06/2021 03:22 AM    POTASSIUM 3.6 11/06/2021 03:22 AM    CHLORIDE 100 11/06/2021 03:22 AM    CO2 25 11/06/2021 03:22 AM    GLUCOSE 183 (H) 11/06/2021 03:22 AM    BUN 14 11/06/2021 03:22 AM    CREATININE 0.72 11/06/2021 03:22 AM      Lab Results   Component Value Date/Time    ASTSGOT 13 11/06/2021 03:22 AM    ALTSGPT 8 11/06/2021 03:22 AM     Lab Results   Component Value Date/Time    CHOLSTRLTOT 145 01/20/2021 09:32 AM    LDL 90 01/20/2021 09:32 AM    HDL 33 (A) 01/20/2021 09:32 AM    TRIGLYCERIDE 110 01/20/2021 09:32 AM    TROPONINT 18 10/31/2021 08:04 PM       No results for input(s): NTPROBNP in the last 72 hours.    Cardiac Imaging and Procedures Review  EKG: SR    Echocardiogram:    10/30/2021  Echo intense partially mobile mass at the right atrium    PAMELA 10/30/21:  CONCLUSIONS  No evidence of valvular endocarditis.  Mobile mass seen in the right atrium and possibly in the right   ventricle, unclear if it is attached to the ICD lead.   Please refer to TTE for detailed study on right heart.        EP procedure note:  11/4/2021  Successful dual-chamber defibrillator system removal, [place] model.    Imaging  Chest X-Ray:  Faint bibasilar opacities consistent with atelectasis and/or interstitial edema.     2.  Small bilateral pleural effusions.    Stress Test:  n/a    Assessment/Plan    MSSA bacteremia  -Appreciate ID  -Continue with Ancef through 12/15/2021, plan for repeat echo at the end of the therapy  -PAMELA 10/30/2021: Mobile mass seen in the right atrium, possibly in the right ventricle, mildly present at present ICD lead.  -Underwent successful dual-chamber defibrillator system removal, [employer], 11/4/2021  -Follow-up appointment with our  cardiology office 11/18/2021 to evaluate need for reimplantation as an outpatient in the future.    Right-sided chest discomfort  -Chest x-ray, no pneumothorax  -EKG, no acute changes  -Limited echo, small predominantly posterior pericardial effusion without evidence of hemodynamic compromise.      PAF  Diagnosed 2016 after CVA  -Currently in sinus rhythm  -On digoxin  -Toprol-XL 25  -Continue with Eliquis    Ischemic cardiomyopathy, LVEF 20%  History of ST elevation anterior MI, 3/11/2017 with PCI/OSWALD proximal LAD mid LAD.  2008 - PCI to the LAD, POBA to the diagonal.   2014 - anterior STEMI. POBA to the diagonal. PCI to the proximal circumflex.  Restented again in 2014 after his Plavix was stopped for an injection and patient has recurrent MI.  2016 - recurrent MI. POBA to unknown vessel  -on Toprol-XL 25  -Digoxin 125 MCG  -Reportedly patient was not able to tolerate ACE/ARB due to dizziness and severe hypotension.    Lupus  -Diagnosed 2 years ago        Thank you for allowing me to participate in the care of this patient.    Please contact me with any questions.    ELAINE Cary.   Cardiologist, [facility]  [phone]

## 2021-11-06 NOTE — PROGRESS NOTES
Report received at bedside, pt care assumed, tele box on. Pt aaox4, on RA, no signs of distress noted at this time. Patient resting comfortably in bed. POC discussed with pt and verbalizes no questions. Pt denies any additional needs at this time. Bed in lowest position, pt educated on fall risk and verbalized understanding, call light within reach, bed alarm plugged in and on. WQill continue hourly rounding.      Crisis charting COVID 19 surge in effect

## 2021-11-06 NOTE — CARE PLAN
The patient is Watcher - Medium risk of patient condition declining or worsening    Shift Goals  Clinical Goals: AICD placement  Patient Goals: pain management  Family Goals: KAL. No family present.     Progress made toward(s) clinical / shift goals:    Problem: Knowledge Deficit - Standard  Goal: Patient and family/care givers will demonstrate understanding of plan of care, disease process/condition, diagnostic tests and medications  Outcome: Progressing       Patient is not progressing towards the following goals:      Problem: Pain - Standard  Goal: Alleviation of pain or a reduction in pain to the patient’s comfort goal  Outcome: Not Progressing

## 2021-11-07 LAB
ALBUMIN SERPL BCP-MCNC: 2.7 G/DL (ref 3.2–4.9)
ALBUMIN/GLOB SERPL: 0.6 G/DL
ALP SERPL-CCNC: 106 U/L (ref 30–99)
ALT SERPL-CCNC: 10 U/L (ref 2–50)
ANION GAP SERPL CALC-SCNC: 9 MMOL/L (ref 7–16)
AST SERPL-CCNC: 18 U/L (ref 12–45)
BASOPHILS # BLD AUTO: 0.8 % (ref 0–1.8)
BASOPHILS # BLD: 0.07 K/UL (ref 0–0.12)
BILIRUB SERPL-MCNC: 0.4 MG/DL (ref 0.1–1.5)
BUN SERPL-MCNC: 10 MG/DL (ref 8–22)
C3 SERPL-MCNC: 63.4 MG/DL (ref 87–200)
C4 SERPL-MCNC: 5.1 MG/DL (ref 19–52)
CALCIUM SERPL-MCNC: 8.6 MG/DL (ref 8.5–10.5)
CHLORIDE SERPL-SCNC: 103 MMOL/L (ref 96–112)
CO2 SERPL-SCNC: 27 MMOL/L (ref 20–33)
CREAT SERPL-MCNC: 0.61 MG/DL (ref 0.5–1.4)
EOSINOPHIL # BLD AUTO: 0.12 K/UL (ref 0–0.51)
EOSINOPHIL NFR BLD: 1.4 % (ref 0–6.9)
ERYTHROCYTE [DISTWIDTH] IN BLOOD BY AUTOMATED COUNT: 56.5 FL (ref 35.9–50)
GLOBULIN SER CALC-MCNC: 4.6 G/DL (ref 1.9–3.5)
GLUCOSE BLD-MCNC: 176 MG/DL (ref 65–99)
GLUCOSE BLD-MCNC: 212 MG/DL (ref 65–99)
GLUCOSE BLD-MCNC: 239 MG/DL (ref 65–99)
GLUCOSE BLD-MCNC: 246 MG/DL (ref 65–99)
GLUCOSE SERPL-MCNC: 131 MG/DL (ref 65–99)
HCT VFR BLD AUTO: 43.5 % (ref 42–52)
HGB BLD-MCNC: 14.6 G/DL (ref 14–18)
IMM GRANULOCYTES # BLD AUTO: 0.1 K/UL (ref 0–0.11)
IMM GRANULOCYTES NFR BLD AUTO: 1.1 % (ref 0–0.9)
LACTATE BLD-SCNC: 1 MMOL/L (ref 0.5–2)
LYMPHOCYTES # BLD AUTO: 0.52 K/UL (ref 1–4.8)
LYMPHOCYTES NFR BLD: 5.9 % (ref 22–41)
MCH RBC QN AUTO: 36.1 PG (ref 27–33)
MCHC RBC AUTO-ENTMCNC: 33.6 G/DL (ref 33.7–35.3)
MCV RBC AUTO: 107.7 FL (ref 81.4–97.8)
MONOCYTES # BLD AUTO: 0.51 K/UL (ref 0–0.85)
MONOCYTES NFR BLD AUTO: 5.8 % (ref 0–13.4)
NEUTROPHILS # BLD AUTO: 7.46 K/UL (ref 1.82–7.42)
NEUTROPHILS NFR BLD: 85 % (ref 44–72)
NRBC # BLD AUTO: 0 K/UL
NRBC BLD-RTO: 0 /100 WBC
PLATELET # BLD AUTO: 336 K/UL (ref 164–446)
PMV BLD AUTO: 10.9 FL (ref 9–12.9)
POTASSIUM SERPL-SCNC: 3.5 MMOL/L (ref 3.6–5.5)
PROT SERPL-MCNC: 7.3 G/DL (ref 6–8.2)
RBC # BLD AUTO: 4.04 M/UL (ref 4.7–6.1)
SODIUM SERPL-SCNC: 139 MMOL/L (ref 135–145)
WBC # BLD AUTO: 8.8 K/UL (ref 4.8–10.8)

## 2021-11-07 PROCEDURE — A9270 NON-COVERED ITEM OR SERVICE: HCPCS | Performed by: NURSE PRACTITIONER

## 2021-11-07 PROCEDURE — 700102 HCHG RX REV CODE 250 W/ 637 OVERRIDE(OP): Performed by: NURSE PRACTITIONER

## 2021-11-07 PROCEDURE — A9270 NON-COVERED ITEM OR SERVICE: HCPCS | Performed by: INTERNAL MEDICINE

## 2021-11-07 PROCEDURE — 700102 HCHG RX REV CODE 250 W/ 637 OVERRIDE(OP): Performed by: INTERNAL MEDICINE

## 2021-11-07 PROCEDURE — 80053 COMPREHEN METABOLIC PANEL: CPT

## 2021-11-07 PROCEDURE — 85025 COMPLETE CBC W/AUTO DIFF WBC: CPT

## 2021-11-07 PROCEDURE — 99232 SBSQ HOSP IP/OBS MODERATE 35: CPT | Mod: 24 | Performed by: NURSE PRACTITIONER

## 2021-11-07 PROCEDURE — 770020 HCHG ROOM/CARE - TELE (206)

## 2021-11-07 PROCEDURE — 82962 GLUCOSE BLOOD TEST: CPT | Mod: 91

## 2021-11-07 PROCEDURE — 700111 HCHG RX REV CODE 636 W/ 250 OVERRIDE (IP): Performed by: STUDENT IN AN ORGANIZED HEALTH CARE EDUCATION/TRAINING PROGRAM

## 2021-11-07 PROCEDURE — 700111 HCHG RX REV CODE 636 W/ 250 OVERRIDE (IP): Performed by: INTERNAL MEDICINE

## 2021-11-07 PROCEDURE — 51798 US URINE CAPACITY MEASURE: CPT

## 2021-11-07 PROCEDURE — 86256 FLUORESCENT ANTIBODY TITER: CPT

## 2021-11-07 PROCEDURE — 99232 SBSQ HOSP IP/OBS MODERATE 35: CPT | Performed by: INTERNAL MEDICINE

## 2021-11-07 PROCEDURE — 86225 DNA ANTIBODY NATIVE: CPT

## 2021-11-07 PROCEDURE — 86160 COMPLEMENT ANTIGEN: CPT | Mod: 91

## 2021-11-07 PROCEDURE — 700101 HCHG RX REV CODE 250: Performed by: INTERNAL MEDICINE

## 2021-11-07 PROCEDURE — 83605 ASSAY OF LACTIC ACID: CPT

## 2021-11-07 RX ORDER — BISACODYL 10 MG
10 SUPPOSITORY, RECTAL RECTAL
Status: DISCONTINUED | OUTPATIENT
Start: 2021-11-07 | End: 2021-11-09 | Stop reason: HOSPADM

## 2021-11-07 RX ORDER — MORPHINE SULFATE 4 MG/ML
4 INJECTION, SOLUTION INTRAMUSCULAR; INTRAVENOUS ONCE
Status: COMPLETED | OUTPATIENT
Start: 2021-11-07 | End: 2021-11-07

## 2021-11-07 RX ORDER — AMOXICILLIN 250 MG
2 CAPSULE ORAL
Status: DISCONTINUED | OUTPATIENT
Start: 2021-11-07 | End: 2021-11-09 | Stop reason: HOSPADM

## 2021-11-07 RX ORDER — POTASSIUM CHLORIDE 20 MEQ/1
20 TABLET, EXTENDED RELEASE ORAL ONCE
Status: COMPLETED | OUTPATIENT
Start: 2021-11-07 | End: 2021-11-07

## 2021-11-07 RX ORDER — POLYETHYLENE GLYCOL 3350 17 G/17G
1 POWDER, FOR SOLUTION ORAL
Status: DISCONTINUED | OUTPATIENT
Start: 2021-11-07 | End: 2021-11-09 | Stop reason: HOSPADM

## 2021-11-07 RX ORDER — HYDROXYUREA 500 MG/1
500 CAPSULE ORAL 2 TIMES DAILY
Status: DISCONTINUED | OUTPATIENT
Start: 2021-11-07 | End: 2021-11-09 | Stop reason: HOSPADM

## 2021-11-07 RX ORDER — DEXTROSE MONOHYDRATE 25 G/50ML
50 INJECTION, SOLUTION INTRAVENOUS
Status: DISCONTINUED | OUTPATIENT
Start: 2021-11-07 | End: 2021-11-09 | Stop reason: HOSPADM

## 2021-11-07 RX ADMIN — POTASSIUM CHLORIDE 20 MEQ: 1500 TABLET, EXTENDED RELEASE ORAL at 09:08

## 2021-11-07 RX ADMIN — APIXABAN 5 MG: 5 TABLET, FILM COATED ORAL at 18:21

## 2021-11-07 RX ADMIN — FINASTERIDE 5 MG: 5 TABLET, FILM COATED ORAL at 04:48

## 2021-11-07 RX ADMIN — OMEPRAZOLE 20 MG: 20 CAPSULE, DELAYED RELEASE ORAL at 04:47

## 2021-11-07 RX ADMIN — METHOCARBAMOL 500 MG: 500 TABLET ORAL at 13:22

## 2021-11-07 RX ADMIN — ACETAMINOPHEN 1000 MG: 500 TABLET ORAL at 20:56

## 2021-11-07 RX ADMIN — OXYCODONE 10 MG: 5 TABLET ORAL at 03:07

## 2021-11-07 RX ADMIN — ACETAMINOPHEN 1000 MG: 500 TABLET ORAL at 04:48

## 2021-11-07 RX ADMIN — METHOCARBAMOL 500 MG: 500 TABLET ORAL at 20:56

## 2021-11-07 RX ADMIN — OXYCODONE 10 MG: 5 TABLET ORAL at 13:43

## 2021-11-07 RX ADMIN — SUCRALFATE 1 G: 1 SUSPENSION ORAL at 18:21

## 2021-11-07 RX ADMIN — LIDOCAINE 1 PATCH: 50 PATCH TOPICAL at 13:22

## 2021-11-07 RX ADMIN — OXYCODONE 5 MG: 5 TABLET ORAL at 20:02

## 2021-11-07 RX ADMIN — APIXABAN 5 MG: 5 TABLET, FILM COATED ORAL at 04:50

## 2021-11-07 RX ADMIN — CEFAZOLIN SODIUM 2 G: 2 INJECTION, SOLUTION INTRAVENOUS at 20:56

## 2021-11-07 RX ADMIN — TAMSULOSIN HYDROCHLORIDE 0.4 MG: 0.4 CAPSULE ORAL at 09:08

## 2021-11-07 RX ADMIN — METHOCARBAMOL 500 MG: 500 TABLET ORAL at 04:48

## 2021-11-07 RX ADMIN — METOPROLOL SUCCINATE 25 MG: 25 TABLET, EXTENDED RELEASE ORAL at 04:47

## 2021-11-07 RX ADMIN — CEFAZOLIN SODIUM 2 G: 2 INJECTION, SOLUTION INTRAVENOUS at 13:22

## 2021-11-07 RX ADMIN — MORPHINE SULFATE 4 MG: 4 INJECTION INTRAVENOUS at 20:57

## 2021-11-07 RX ADMIN — INSULIN GLARGINE 6 UNITS: 100 INJECTION, SOLUTION SUBCUTANEOUS at 09:12

## 2021-11-07 RX ADMIN — GUAIFENESIN 200 MG: 100 SOLUTION ORAL at 04:47

## 2021-11-07 RX ADMIN — DIGOXIN 125 MCG: 125 TABLET ORAL at 18:21

## 2021-11-07 RX ADMIN — INSULIN HUMAN 3 UNITS: 100 INJECTION, SOLUTION PARENTERAL at 13:40

## 2021-11-07 RX ADMIN — CEFAZOLIN SODIUM 2 G: 2 INJECTION, SOLUTION INTRAVENOUS at 04:49

## 2021-11-07 RX ADMIN — SUCRALFATE 1 G: 1 SUSPENSION ORAL at 04:47

## 2021-11-07 RX ADMIN — SENNOSIDES AND DOCUSATE SODIUM 2 TABLET: 50; 8.6 TABLET ORAL at 04:48

## 2021-11-07 RX ADMIN — SUCRALFATE 1 G: 1 SUSPENSION ORAL at 23:05

## 2021-11-07 RX ADMIN — SUCRALFATE 1 G: 1 SUSPENSION ORAL at 13:21

## 2021-11-07 RX ADMIN — HYDROXYUREA 500 MG: 500 CAPSULE ORAL at 18:21

## 2021-11-07 RX ADMIN — INSULIN HUMAN 2 UNITS: 100 INJECTION, SOLUTION PARENTERAL at 09:13

## 2021-11-07 RX ADMIN — Medication 5 MG: at 20:56

## 2021-11-07 RX ADMIN — GUAIFENESIN 200 MG: 100 SOLUTION ORAL at 13:21

## 2021-11-07 RX ADMIN — ACETAMINOPHEN 1000 MG: 500 TABLET ORAL at 13:22

## 2021-11-07 ASSESSMENT — ENCOUNTER SYMPTOMS
BACK PAIN: 0
ABDOMINAL PAIN: 1
FATIGUE: 1
SHORTNESS OF BREATH: 1
CONFUSION: 0
VOMITING: 0
WEAKNESS: 1
DIARRHEA: 0
NAUSEA: 0
MYALGIAS: 0
FLANK PAIN: 0
CONSTIPATION: 0
DIZZINESS: 0
COUGH: 1

## 2021-11-07 ASSESSMENT — PAIN DESCRIPTION - PAIN TYPE
TYPE: ACUTE PAIN

## 2021-11-07 NOTE — CARE PLAN
The patient is Stable - Low risk of patient condition declining or worsening    Shift Goals  Clinical Goals: IV abx  Patient Goals: pain management  Family Goals: KAL. No family present.     Problem: Knowledge Deficit - Standard  Goal: Patient and family/care givers will demonstrate understanding of plan of care, disease process/condition, diagnostic tests and medications  11/6/2021 2115 by Michelle Lara R.N.  Outcome: Progressing  11/6/2021 2115 by Michelle Lara R.N.  Outcome: Progressing     Problem: Pain - Standard  Goal: Alleviation of pain or a reduction in pain to the patient’s comfort goal  11/6/2021 2115 by Michelle Lara R.N.  Outcome: Progressing  11/6/2021 2115 by Michelle Lara R.N.  Outcome: Progressing     Problem: Fall Risk  Goal: Patient will remain free from falls  11/6/2021 2115 by Michelle Lara R.N.  Outcome: Progressing  11/6/2021 2115 by Michelle Lara R.N.  Outcome: Progressing

## 2021-11-07 NOTE — PROGRESS NOTES
Hospital Medicine Daily Progress Note    Date of Service  11/7/2021     Chief Complaint  Francesco Schulte is a 71 y.o. male admitted 11/2/2021 with weakness    Hospital Course  72 yo man with DM, afib, BPH, CHF, AICD who admitted with dizziness and weakness concerning for stroke. MRI was negative and he was found with MSSA bacteremia. PAMELA showed mobile mass in right atrium. ID and cardiology were consulted. His ICD was removed 11/4. PICC was placed to continue ancef until 12/15/21.     Interval Problem Update  HypoK, replete  He says his pain is significantly improved since yesterday with the addition of tylenol and robaxin. He says his shoulder pain is better, there' less stiffness to his extremities, the sharp pains in his abdomen and chest are minimal. He was able to sleep well last night. He says he could cry from relief    I have personally seen and examined the patient at bedside. I discussed the plan of care with patient, charge RN,  and pharmacy.    Consultants/Specialty  cardiology and infectious disease    Code Status  Full Code    Disposition  Patient is not medically cleared.   Anticipate discharge to to home with close outpatient follow-up.  I have placed the appropriate orders for post-discharge needs.    Review of Systems  Review of Systems   Constitutional: Positive for malaise/fatigue.   Respiratory: Positive for cough (chronic, mild) and shortness of breath (chronic, mild).    Cardiovascular: Positive for chest pain (improved).   Gastrointestinal: Positive for abdominal pain (improved). Negative for constipation, diarrhea, nausea and vomiting.   Genitourinary: Positive for dysuria. Negative for flank pain.   Musculoskeletal: Negative for back pain and myalgias.   Neurological: Positive for weakness. Negative for dizziness.   All other systems reviewed and are negative.       Physical Exam  Temp:  [36 °C (96.8 °F)-36.3 °C (97.3 °F)] 36.1 °C (97 °F)  Pulse:  [66-80] 74  Resp:   [15-18] 18  BP: (128-140)/(73-80) 140/80  SpO2:  [90 %-93 %] 92 %    Physical Exam  Vitals and nursing note reviewed.   Constitutional:       General: He is not in acute distress.     Appearance: He is not toxic-appearing.   HENT:      Head: Normocephalic.      Mouth/Throat:      Mouth: Mucous membranes are moist.   Eyes:      General:         Right eye: No discharge.         Left eye: No discharge.   Cardiovascular:      Rate and Rhythm: Normal rate and regular rhythm.   Pulmonary:      Effort: Pulmonary effort is normal. No respiratory distress.      Breath sounds: No wheezing or rales.      Comments: Wound dressings over left upper chest, no crepitus, no bruising seen  Chest:      Chest wall: No tenderness.   Abdominal:      General: There is no distension.      Palpations: Abdomen is soft. There is no mass.      Tenderness: There is no abdominal tenderness. There is no guarding or rebound.   Musculoskeletal:         General: Swelling present.      Cervical back: Neck supple.   Skin:     General: Skin is warm and dry.   Neurological:      Mental Status: He is alert and oriented to person, place, and time.         Fluids    Intake/Output Summary (Last 24 hours) at 11/7/2021 1452  Last data filed at 11/7/2021 1322  Gross per 24 hour   Intake --   Output 1500 ml   Net -1500 ml       Laboratory  Recent Labs     11/05/21  0251 11/06/21  0322 11/07/21  0300   WBC 9.1 9.1 8.8   RBC 4.10* 4.03* 4.04*   HEMOGLOBIN 14.6 14.5 14.6   HEMATOCRIT 44.1 43.2 43.5   .6* 107.2* 107.7*   MCH 35.6* 36.0* 36.1*   MCHC 33.1* 33.6* 33.6*   RDW 57.0* 55.8* 56.5*   PLATELETCT 376 367 336   MPV 10.8 10.6 10.9     Recent Labs     11/05/21  0251 11/06/21  0322 11/07/21  0300   SODIUM 135 134* 139   POTASSIUM 3.7 3.6 3.5*   CHLORIDE 102 100 103   CO2 25 25 27   GLUCOSE 177* 183* 131*   BUN 12 14 10   CREATININE 0.63 0.72 0.61   CALCIUM 8.7 8.6 8.6                   Imaging  IR-PICC LINE PLACEMENT W/ GUIDANCE > AGE 5   Final Result                   Ultrasound-guided PICC placement performed by qualified nursing staff as    above.          EC-ECHOCARDIOGRAM LTD W/O CONT   Final Result      DX-CHEST-PORTABLE (1 VIEW)   Final Result      1.  Faint bibasilar opacities consistent with atelectasis and/or interstitial edema.      2.  Small bilateral pleural effusions.      DX-CHEST-PORTABLE (1 VIEW)   Final Result      1.  No pneumothorax following pacemaker removal.   2.  Interstitial edema. Possible small bilateral effusions.            CL-ICD,PM,BIV LEAD EXTRACTION W/ ANESTHESIA    (Results Pending)        Assessment/Plan  * Acute bacterial endocarditis  Assessment & Plan  continue IV cefazolin 2 g every 8 hours  On discharge, will plan IV Ancef 6 g every 24 hours through 12/15/2021 per ID  Weekly CBC and CMP  Repeat TTE prior to stopping antibiotic  PICC placed  HH ordered    Generalized abdominal pain  Assessment & Plan  His pain has been present since his admission at [facility]. Seems to be migratory across his chest and abdomen.  CXR and EKG did not show acute findings. TTE showed minimal pleural effusion. He does not have complications from his ICD removal  UA does not show clear infection. His pain did not improve with go cath removal.  He also had recent multiple MRIs of his abd and spine that were unremarkable. He does have cholelithiasis but his pain is mostly left sided. His LFTs and lipase were normal.  He is not constipated nor have diarrhea  He has no tachycardia, tachypnea, or hypoxia to suggest PE. He is on therapeutic Eliquis  He does not have pain after eating. Suspicion for mesenteric ischemia is low. I will check a lactic acid  Will trial PPI and carafate for acid reflux  He does have significant chest tenderness on exam, possible MSK pain. He has lidocaine patch ordered. Continue opioid PRN.  His pain is already improved with tylenol and robaxin, suspect his pain was MSK    AICD (automatic cardioverter/defibrillator)  present- (present on admission)  Assessment & Plan  Removed 11/4  Continue telemetry    Type 2 diabetes mellitus without complication, with long-term current use of insulin (HCC)- (present on admission)  Assessment & Plan  Hyperglycemic, increase glargine to 10U daily  ISS    Chronic systolic heart failure (HCC)- (present on admission)  Assessment & Plan  Continue digoxin and metop  He was previously on aldactone and did not tolerate ACEI/ARB  Monitor for now, f/u cardiology recs    Paroxysmal A-fib (HCC)- (present on admission)  Assessment & Plan  Currently in sinus rhythm  Restarted on Eliquis  Continue metop       VTE prophylaxis: Eliquis    I have performed a physical exam and reviewed and updated ROS and Plan today (11/7/2021). In review of yesterday's note (11/6/2021), there are no changes except as documented above.

## 2021-11-07 NOTE — PROGRESS NOTES
Bedside report received and patient care assumed. Pt is resting in bed, A&O4, with no complaints of pain, and is on RA. Tele box on. All fall precautions are in place, belongings at bedside table.  Pt was updated on POC, no questions or concerns. Pt educated on use of call light for assistance. Will continue to monitor.     Crisis Charting: COVID-19 surge in effect

## 2021-11-07 NOTE — PROGRESS NOTES
Cardiology Follow Up Progress Note    Date of Service  11/7/2021    Attending Physician  Rosanna Ramírez M.D.    Presented with MSSA bacteremia, PAMELA confirmed possible mobile mass in the right atrium consistent with endocarditis.    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  11/6/2021 reports sharp pain to left-sided abdomen lasts 4 to 5 seconds.  MR abdomen 10/26/2021 showing cholelithiasis without pericholecystic, gallbladder wall thickening or biliary dilatation, no choledocholithiasis.  11/5/2021 atypical chest discomfort status post repeat limited echo 11/5/2021 showing small predominantly posterior pericardial effusion without evidence of hemodynamic compromise.  EKG with no acute changes.  Continue with supportive therapy  11/7/2021: This afternoon he is stable, denies any pain, discussed with Dr. Ramírez plan for discharge on Monday with home health care.      Review of Systems  Review of Systems   Constitutional: Positive for fatigue.   Cardiovascular: Negative for chest pain.   Psychiatric/Behavioral: Negative for confusion.       Vital signs in last 24 hours  Temp:  [36 °C (96.8 °F)-36.3 °C (97.3 °F)] 36.1 °C (97 °F)  Pulse:  [66-80] 74  Resp:  [15-18] 18  BP: (128-140)/(73-80) 140/80  SpO2:  [90 %-93 %] 92 %    Physical Exam  Physical Exam  Cardiovascular:      Rate and Rhythm: Normal rate and regular rhythm.   Pulmonary:      Breath sounds: No wheezing.   Skin:     General: Skin is warm.      Comments: Status post AICD extraction, 11/4/2011, site  uncomplicated         Lab Review  Lab Results   Component Value Date/Time    WBC 8.8 11/07/2021 03:00 AM    RBC 4.04 (L) 11/07/2021 03:00 AM    HEMOGLOBIN 14.6 11/07/2021 03:00 AM    HEMATOCRIT 43.5 11/07/2021 03:00 AM    .7 (H) 11/07/2021 03:00 AM    MCH 36.1 (H) 11/07/2021 03:00 AM    MCHC 33.6 (L) 11/07/2021 03:00 AM    MPV 10.9 11/07/2021 03:00 AM      Lab Results   Component Value Date/Time    SODIUM 139 11/07/2021 03:00 AM    POTASSIUM 3.5 (L) 11/07/2021 03:00 AM    CHLORIDE 103 11/07/2021 03:00 AM    CO2 27 11/07/2021 03:00 AM    GLUCOSE 131 (H) 11/07/2021 03:00 AM    BUN 10 11/07/2021 03:00 AM    CREATININE 0.61 11/07/2021 03:00 AM      Lab Results   Component Value Date/Time    ASTSGOT 18 11/07/2021 03:00 AM    ALTSGPT 10 11/07/2021 03:00 AM     Lab Results   Component Value Date/Time    CHOLSTRLTOT 145 01/20/2021 09:32 AM    LDL 90 01/20/2021 09:32 AM    HDL 33 (A) 01/20/2021 09:32 AM    TRIGLYCERIDE 110 01/20/2021 09:32 AM    TROPONINT 18 10/31/2021 08:04 PM       No results for input(s): NTPROBNP in the last 72 hours.    Cardiac Imaging and Procedures Review  EKG: SR    Echocardiogram:    10/30/2021  Echo intense partially mobile mass at the right atrium    PAMELA 10/30/21:  CONCLUSIONS  No evidence of valvular endocarditis.  Mobile mass seen in the right atrium and possibly in the right   ventricle, unclear if it is attached to the ICD lead.   Please refer to TTE for detailed study on right heart.        EP procedure note:  11/4/2021  Successful dual-chamber defibrillator system removal, [place] model.    Imaging  Chest X-Ray:  Faint bibasilar opacities consistent with atelectasis and/or interstitial edema.     2.  Small bilateral pleural effusions.    Stress Test:  n/a    Assessment/Plan    MSSA bacteremia  -Appreciate ID  -Continue with Ancef through 12/15/2021, plan for repeat echo at the end of the therapy  -PAMELA 10/30/2021: Mobile mass seen in the right atrium, possibly in the right ventricle,  mildly present at present ICD lead.  -Underwent successful dual-chamber defibrillator system removal, [place] model, 11/4/2021  -Follow-up appointment with our cardiology office 11/18/2021 to evaluate need for reimplantation as an outpatient in the future.    Right-sided chest discomfort  -Chest x-ray, no pneumothorax  -EKG, no acute changes  -Limited echo, small predominantly posterior pericardial effusion without evidence of hemodynamic compromise.  -Currently stable      PAF  Diagnosed 2016 after CVA  -Currently in sinus rhythm  -On digoxin  -Toprol-XL 25  -Continue with Eliquis    Ischemic cardiomyopathy, LVEF 20%  History of ST elevation anterior MI, 3/11/2017 with PCI/OSWALD proximal LAD mid LAD.  2008 - PCI to the LAD, POBA to the diagonal.   2014 - anterior STEMI. POBA to the diagonal. PCI to the proximal circumflex.  Restented again in 2014 after his Plavix was stopped for an injection and patient has recurrent MI.  2016 - recurrent MI. POBA to unknown vessel  -on Toprol-XL 25  -Digoxin 125 MCG  -Reportedly patient was not able to tolerate ACE/ARB due to dizziness and severe hypotension.    Lupus  -Diagnosed 2 years ago    No further cardiac work-up.  Follow-up appointments with our cardiology office on 11/18/2021 at 10 AM.  Likely discharge Monday, 11/8/2021 with home health care.        Thank you for allowing me to participate in the care of this patient.    Please contact me with any questions.    ELAINE Cary.   Cardiologist, [facility]  [phone]

## 2021-11-08 LAB
ANION GAP SERPL CALC-SCNC: 11 MMOL/L (ref 7–16)
BUN SERPL-MCNC: 9 MG/DL (ref 8–22)
CALCIUM SERPL-MCNC: 8.9 MG/DL (ref 8.5–10.5)
CHLORIDE SERPL-SCNC: 101 MMOL/L (ref 96–112)
CO2 SERPL-SCNC: 24 MMOL/L (ref 20–33)
CREAT SERPL-MCNC: 0.69 MG/DL (ref 0.5–1.4)
GLUCOSE BLD-MCNC: 153 MG/DL (ref 65–99)
GLUCOSE BLD-MCNC: 200 MG/DL (ref 65–99)
GLUCOSE BLD-MCNC: 216 MG/DL (ref 65–99)
GLUCOSE BLD-MCNC: 218 MG/DL (ref 65–99)
GLUCOSE SERPL-MCNC: 182 MG/DL (ref 65–99)
MAGNESIUM SERPL-MCNC: 1.9 MG/DL (ref 1.5–2.5)
POTASSIUM SERPL-SCNC: 3.7 MMOL/L (ref 3.6–5.5)
SODIUM SERPL-SCNC: 136 MMOL/L (ref 135–145)

## 2021-11-08 PROCEDURE — 83735 ASSAY OF MAGNESIUM: CPT

## 2021-11-08 PROCEDURE — 700102 HCHG RX REV CODE 250 W/ 637 OVERRIDE(OP): Performed by: INTERNAL MEDICINE

## 2021-11-08 PROCEDURE — A9270 NON-COVERED ITEM OR SERVICE: HCPCS | Performed by: INTERNAL MEDICINE

## 2021-11-08 PROCEDURE — 51798 US URINE CAPACITY MEASURE: CPT

## 2021-11-08 PROCEDURE — A9270 NON-COVERED ITEM OR SERVICE: HCPCS | Performed by: NURSE PRACTITIONER

## 2021-11-08 PROCEDURE — 99232 SBSQ HOSP IP/OBS MODERATE 35: CPT | Performed by: INTERNAL MEDICINE

## 2021-11-08 PROCEDURE — 770020 HCHG ROOM/CARE - TELE (206)

## 2021-11-08 PROCEDURE — 700102 HCHG RX REV CODE 250 W/ 637 OVERRIDE(OP): Performed by: NURSE PRACTITIONER

## 2021-11-08 PROCEDURE — 80048 BASIC METABOLIC PNL TOTAL CA: CPT

## 2021-11-08 PROCEDURE — 700111 HCHG RX REV CODE 636 W/ 250 OVERRIDE (IP): Performed by: INTERNAL MEDICINE

## 2021-11-08 PROCEDURE — 82962 GLUCOSE BLOOD TEST: CPT

## 2021-11-08 RX ADMIN — SUCRALFATE 1 G: 1 SUSPENSION ORAL at 13:30

## 2021-11-08 RX ADMIN — ACETAMINOPHEN 1000 MG: 500 TABLET ORAL at 21:42

## 2021-11-08 RX ADMIN — APIXABAN 5 MG: 5 TABLET, FILM COATED ORAL at 05:46

## 2021-11-08 RX ADMIN — OXYCODONE 10 MG: 5 TABLET ORAL at 02:30

## 2021-11-08 RX ADMIN — METHOCARBAMOL 500 MG: 500 TABLET ORAL at 05:46

## 2021-11-08 RX ADMIN — METOPROLOL SUCCINATE 25 MG: 25 TABLET, EXTENDED RELEASE ORAL at 05:46

## 2021-11-08 RX ADMIN — HYDROXYUREA 500 MG: 500 CAPSULE ORAL at 18:50

## 2021-11-08 RX ADMIN — CEFAZOLIN SODIUM 2 G: 2 INJECTION, SOLUTION INTRAVENOUS at 05:46

## 2021-11-08 RX ADMIN — ACETAMINOPHEN 1000 MG: 500 TABLET ORAL at 13:30

## 2021-11-08 RX ADMIN — OXYCODONE 10 MG: 5 TABLET ORAL at 21:41

## 2021-11-08 RX ADMIN — OXYCODONE 5 MG: 5 TABLET ORAL at 13:42

## 2021-11-08 RX ADMIN — CEFAZOLIN SODIUM 2 G: 2 INJECTION, SOLUTION INTRAVENOUS at 13:31

## 2021-11-08 RX ADMIN — DIGOXIN 125 MCG: 125 TABLET ORAL at 18:17

## 2021-11-08 RX ADMIN — FINASTERIDE 5 MG: 5 TABLET, FILM COATED ORAL at 05:46

## 2021-11-08 RX ADMIN — METHOCARBAMOL 500 MG: 500 TABLET ORAL at 21:42

## 2021-11-08 RX ADMIN — METHOCARBAMOL 500 MG: 500 TABLET ORAL at 13:30

## 2021-11-08 RX ADMIN — SUCRALFATE 1 G: 1 SUSPENSION ORAL at 18:18

## 2021-11-08 RX ADMIN — Medication 5 MG: at 21:42

## 2021-11-08 RX ADMIN — SUCRALFATE 1 G: 1 SUSPENSION ORAL at 05:46

## 2021-11-08 RX ADMIN — ACETAMINOPHEN 1000 MG: 500 TABLET ORAL at 05:45

## 2021-11-08 RX ADMIN — HYDROXYUREA 500 MG: 500 CAPSULE ORAL at 05:47

## 2021-11-08 RX ADMIN — CEFAZOLIN SODIUM 2 G: 2 INJECTION, SOLUTION INTRAVENOUS at 21:42

## 2021-11-08 RX ADMIN — TAMSULOSIN HYDROCHLORIDE 0.4 MG: 0.4 CAPSULE ORAL at 10:27

## 2021-11-08 RX ADMIN — OMEPRAZOLE 20 MG: 20 CAPSULE, DELAYED RELEASE ORAL at 05:46

## 2021-11-08 RX ADMIN — GUAIFENESIN 200 MG: 100 SOLUTION ORAL at 18:17

## 2021-11-08 RX ADMIN — APIXABAN 5 MG: 5 TABLET, FILM COATED ORAL at 18:17

## 2021-11-08 ASSESSMENT — PAIN DESCRIPTION - PAIN TYPE
TYPE: ACUTE PAIN

## 2021-11-08 ASSESSMENT — ENCOUNTER SYMPTOMS
SHORTNESS OF BREATH: 1
DIZZINESS: 0
DIARRHEA: 0
MYALGIAS: 0
COUGH: 1
ABDOMINAL PAIN: 1
BACK PAIN: 0
NAUSEA: 0
CONSTIPATION: 0
FLANK PAIN: 0
VOMITING: 0
WEAKNESS: 1

## 2021-11-08 NOTE — DISCHARGE PLANNING
Good morning and thank you for sending [facility] this referral,  Per orders patient will be receiving home infusions through [facility] however we still do not have an order from [facility].  Referral continues to be on hold pending receipt of [facility] infusion orders.

## 2021-11-08 NOTE — DISCHARGE PLANNING
Received Choice form at 1250  Agency/Facility Name: [facility]  Referral sent per Choice form @ 1250      RN TOSIN informed

## 2021-11-08 NOTE — DISCHARGE PLANNING
Anticipated Discharge Disposition: Home with HH      Action: TOSIN RN met with pt at bedside. Discussed HH choice. Pt stating would like to use HH company from previous stay. Reviewed Case management notes, pt previously used [facility]. Sent choice form for [facility].     Barriers to Discharge: Home health acceptance     Plan: Case management to follow up with Infusion choice.     1316: TOSIN RN met with pt about choice for Infusion Therapy, sent to [facility].

## 2021-11-08 NOTE — DISCHARGE PLANNING
Received Choice form at 1425  Agency/Facility Name: [facility]  Referral sent per Choice form @ 1425      RN CM informed

## 2021-11-08 NOTE — PROGRESS NOTES
Hospital Medicine Daily Progress Note    Date of Service  11/8/2021     Chief Complaint  Francesco Schulte is a 71 y.o. male admitted 11/2/2021 with weakness    Hospital Course  72 yo man with DM, afib, BPH, CHF, AICD who admitted with dizziness and weakness concerning for stroke. MRI was negative and he was found with MSSA bacteremia. He had MRI of his abdomen and spine that were negative for infection. PAMELA showed mobile mass in right atrium. ID and cardiology were consulted. His ICD was removed 11/4. PICC was placed to continue ancef until 12/15/21. Home health has been ordered. Of note, he had multiple pains in his shoulders, chest and abdomen that appear to be MSK. His pain significantly improve with the addition of robaxin and tylenol. He has go cath in placed, that will be removed on discharge and he will continue straight cath at home.    Interval Problem Update  He says his pain continues to be well controlled on the current medications.  Home health is pending. He says his wife will be home tomorrow.  Go cath was replaced yesterday. Patient says he rather keep the go cath in until discharge, then remove and straight cath at home. He has used straight for a long time. He has no interest and doesn't want to see a urology.    I have personally seen and examined the patient at bedside. I discussed the plan of care with patient, charge RN,  and pharmacy.    Consultants/Specialty  cardiology and infectious disease    Code Status  Full Code    Disposition  Patient is not medically cleared.   Anticipate discharge to to home with close outpatient follow-up.  I have placed the appropriate orders for post-discharge needs.    Review of Systems  Review of Systems   Constitutional: Positive for malaise/fatigue.   Respiratory: Positive for cough (chronic, mild) and shortness of breath (chronic, mild).    Cardiovascular: Positive for chest pain (improved).   Gastrointestinal: Positive for  abdominal pain (improved). Negative for constipation, diarrhea, nausea and vomiting.   Genitourinary: Positive for dysuria. Negative for flank pain.   Musculoskeletal: Negative for back pain and myalgias.   Neurological: Positive for weakness. Negative for dizziness.   All other systems reviewed and are negative.       Physical Exam  Temp:  [36 °C (96.8 °F)-36.7 °C (98.1 °F)] 36.7 °C (98.1 °F)  Pulse:  [74-90] 83  Resp:  [18] 18  BP: (105-150)/(57-99) 128/85  SpO2:  [93 %-97 %] 94 %    Physical Exam  Vitals and nursing note reviewed.   Constitutional:       General: He is not in acute distress.     Appearance: He is not toxic-appearing.   HENT:      Head: Normocephalic.      Mouth/Throat:      Mouth: Mucous membranes are moist.   Eyes:      General:         Right eye: No discharge.         Left eye: No discharge.   Cardiovascular:      Rate and Rhythm: Normal rate and regular rhythm.   Pulmonary:      Effort: Pulmonary effort is normal. No respiratory distress.      Breath sounds: No wheezing or rales.      Comments: Wound dressings over left upper chest, no crepitus, no bruising seen  Chest:      Chest wall: No tenderness.   Abdominal:      General: There is no distension.      Palpations: Abdomen is soft. There is no mass.      Tenderness: There is no abdominal tenderness. There is no guarding or rebound.   Musculoskeletal:         General: Swelling present.      Cervical back: Neck supple.   Skin:     General: Skin is warm and dry.   Neurological:      Mental Status: He is alert and oriented to person, place, and time.         Fluids    Intake/Output Summary (Last 24 hours) at 11/8/2021 1502  Last data filed at 11/8/2021 1100  Gross per 24 hour   Intake 480 ml   Output 2300 ml   Net -1820 ml       Laboratory  Recent Labs     11/06/21  0322 11/07/21  0300   WBC 9.1 8.8   RBC 4.03* 4.04*   HEMOGLOBIN 14.5 14.6   HEMATOCRIT 43.2 43.5   .2* 107.7*   MCH 36.0* 36.1*   MCHC 33.6* 33.6*   RDW 55.8* 56.5*    PLATELETCT 367 336   MPV 10.6 10.9     Recent Labs     11/06/21  0322 11/07/21  0300 11/08/21  0237   SODIUM 134* 139 136   POTASSIUM 3.6 3.5* 3.7   CHLORIDE 100 103 101   CO2 25 27 24   GLUCOSE 183* 131* 182*   BUN 14 10 9   CREATININE 0.72 0.61 0.69   CALCIUM 8.6 8.6 8.9                   Imaging  IR-PICC LINE PLACEMENT W/ GUIDANCE > AGE 5   Final Result                  Ultrasound-guided PICC placement performed by qualified nursing staff as    above.          EC-ECHOCARDIOGRAM LTD W/O CONT   Final Result      DX-CHEST-PORTABLE (1 VIEW)   Final Result      1.  Faint bibasilar opacities consistent with atelectasis and/or interstitial edema.      2.  Small bilateral pleural effusions.      DX-CHEST-PORTABLE (1 VIEW)   Final Result      1.  No pneumothorax following pacemaker removal.   2.  Interstitial edema. Possible small bilateral effusions.            CL-ICD,PM,BIV LEAD EXTRACTION W/ ANESTHESIA    (Results Pending)        Assessment/Plan  * Acute bacterial endocarditis  Assessment & Plan  continue IV cefazolin 2 g every 8 hours  On discharge, will plan IV Ancef 6 g every 24 hours through 12/15/2021 per ID  Weekly CBC and CMP  Repeat TTE prior to stopping antibiotic  PICC placed  HH ordered    Generalized abdominal pain  Assessment & Plan  His pain has been present since his admission at [facility]. Seems to be migratory across his chest and abdomen.  CXR and EKG did not show acute findings. TTE showed minimal pleural effusion. He does not have complications from his ICD removal  UA does not show clear infection. His pain did not improve with go cath removal.  He also had recent multiple MRIs of his abd and spine that were unremarkable. He does have cholelithiasis but his pain is mostly left sided. His LFTs and lipase were normal.  He is not constipated nor have diarrhea  He has no tachycardia, tachypnea, or hypoxia to suggest PE. He is on therapeutic Eliquis  He does not have pain after eating.  Suspicion for mesenteric ischemia is low. I will check a lactic acid  Will trial PPI and carafate for acid reflux  He does have significant chest tenderness on exam, possible MSK pain. He has lidocaine patch ordered. Continue opioid PRN.  His pain is already improved with tylenol and robaxin, suspect his pain was MSK    AICD (automatic cardioverter/defibrillator) present- (present on admission)  Assessment & Plan  Removed 11/4  Continue telemetry    Type 2 diabetes mellitus without complication, with long-term current use of insulin (HCC)- (present on admission)  Assessment & Plan  Hyperglycemic, increase glargine to 10U daily  ISS    Chronic systolic heart failure (HCC)- (present on admission)  Assessment & Plan  Continue digoxin and metop  He was previously on aldactone and did not tolerate ACEI/ARB  Monitor for now, f/u cardiology recs    Paroxysmal A-fib (HCC)- (present on admission)  Assessment & Plan  Currently in sinus rhythm  Restarted on Eliquis  Continue metop       VTE prophylaxis: Eliquis    I have performed a physical exam and reviewed and updated ROS and Plan today (11/8/2021). In review of yesterday's note (11/7/2021), there are no changes except as documented above.

## 2021-11-08 NOTE — PROGRESS NOTES
Assumed care of PT A&O 4 with intermittent confusion in conversation. Pt resting in bed with no signs of labored breathing. On RA. Tele monitor in place, cardiac rhythm being monitored. Call light within reach, bed in lowest position, upper bed rails up. Pt was updated on plan of care for the day . Will continue to monitor.

## 2021-11-08 NOTE — CARE PLAN
The patient is Watcher - Medium risk of patient condition declining or worsening    Shift Goals  Clinical Goals: AICD placement  Patient Goals: pain management  Family Goals: KAL. No family present.     Progress made toward(s) clinical / shift goals:    Problem: Knowledge Deficit - Standard  Goal: Patient and family/care givers will demonstrate understanding of plan of care, disease process/condition, diagnostic tests and medications  Outcome: Progressing     Problem: Pain - Standard  Goal: Alleviation of pain or a reduction in pain to the patient’s comfort goal  Outcome: Progressing       Patient is not progressing towards the following goals:

## 2021-11-09 VITALS
WEIGHT: 172.84 LBS | HEIGHT: 75 IN | OXYGEN SATURATION: 95 % | SYSTOLIC BLOOD PRESSURE: 140 MMHG | RESPIRATION RATE: 18 BRPM | BODY MASS INDEX: 21.49 KG/M2 | HEART RATE: 93 BPM | TEMPERATURE: 97.5 F | DIASTOLIC BLOOD PRESSURE: 86 MMHG

## 2021-11-09 LAB
DSDNA AB TITR SER CLIF: 74 IU (ref 0–24)
GLUCOSE BLD-MCNC: 159 MG/DL (ref 65–99)
SARS-COV+SARS-COV-2 AG RESP QL IA.RAPID: NOTDETECTED
SPECIMEN SOURCE: NORMAL

## 2021-11-09 PROCEDURE — 700102 HCHG RX REV CODE 250 W/ 637 OVERRIDE(OP): Performed by: INTERNAL MEDICINE

## 2021-11-09 PROCEDURE — A9270 NON-COVERED ITEM OR SERVICE: HCPCS | Performed by: NURSE PRACTITIONER

## 2021-11-09 PROCEDURE — 700102 HCHG RX REV CODE 250 W/ 637 OVERRIDE(OP): Performed by: NURSE PRACTITIONER

## 2021-11-09 PROCEDURE — 87426 SARSCOV CORONAVIRUS AG IA: CPT

## 2021-11-09 PROCEDURE — A9270 NON-COVERED ITEM OR SERVICE: HCPCS | Performed by: INTERNAL MEDICINE

## 2021-11-09 PROCEDURE — 99239 HOSP IP/OBS DSCHRG MGMT >30: CPT | Performed by: STUDENT IN AN ORGANIZED HEALTH CARE EDUCATION/TRAINING PROGRAM

## 2021-11-09 PROCEDURE — 82962 GLUCOSE BLOOD TEST: CPT

## 2021-11-09 PROCEDURE — 700111 HCHG RX REV CODE 636 W/ 250 OVERRIDE (IP): Performed by: INTERNAL MEDICINE

## 2021-11-09 RX ORDER — SUCRALFATE 1 G/1
1 TABLET ORAL
Qty: 120 TABLET | Refills: 3 | Status: SHIPPED | OUTPATIENT
Start: 2021-11-09 | End: 2022-02-17

## 2021-11-09 RX ORDER — CHOLECALCIFEROL (VITAMIN D3) 125 MCG
5 CAPSULE ORAL DAILY
Qty: 30 TABLET | Refills: 0 | Status: SHIPPED | OUTPATIENT
Start: 2021-11-09 | End: 2022-02-17

## 2021-11-09 RX ORDER — LIDOCAINE 50 MG/G
2 PATCH TOPICAL EVERY 24 HOURS
Qty: 10 PATCH | Refills: 0 | Status: SHIPPED | OUTPATIENT
Start: 2021-11-10 | End: 2022-02-17

## 2021-11-09 RX ORDER — METHOCARBAMOL 500 MG/1
500 TABLET, FILM COATED ORAL EVERY 8 HOURS
Qty: 120 TABLET | Refills: 0 | Status: SHIPPED | OUTPATIENT
Start: 2021-11-09 | End: 2022-02-17

## 2021-11-09 RX ORDER — MECLIZINE HYDROCHLORIDE 25 MG/1
25 TABLET ORAL 3 TIMES DAILY PRN
Qty: 30 TABLET | Refills: 0 | Status: SHIPPED | OUTPATIENT
Start: 2021-11-09 | End: 2022-02-17

## 2021-11-09 RX ADMIN — SUCRALFATE 1 G: 1 SUSPENSION ORAL at 00:00

## 2021-11-09 RX ADMIN — OMEPRAZOLE 20 MG: 20 CAPSULE, DELAYED RELEASE ORAL at 06:00

## 2021-11-09 RX ADMIN — CEFAZOLIN SODIUM 2 G: 2 INJECTION, SOLUTION INTRAVENOUS at 05:34

## 2021-11-09 RX ADMIN — FINASTERIDE 5 MG: 5 TABLET, FILM COATED ORAL at 05:34

## 2021-11-09 RX ADMIN — TAMSULOSIN HYDROCHLORIDE 0.4 MG: 0.4 CAPSULE ORAL at 10:59

## 2021-11-09 RX ADMIN — CEFAZOLIN SODIUM 2 G: 2 INJECTION, SOLUTION INTRAVENOUS at 15:40

## 2021-11-09 RX ADMIN — HYDROXYUREA 500 MG: 500 CAPSULE ORAL at 05:34

## 2021-11-09 RX ADMIN — APIXABAN 5 MG: 5 TABLET, FILM COATED ORAL at 05:34

## 2021-11-09 RX ADMIN — ACETAMINOPHEN 1000 MG: 500 TABLET ORAL at 15:38

## 2021-11-09 RX ADMIN — SUCRALFATE 1 G: 1 SUSPENSION ORAL at 05:34

## 2021-11-09 RX ADMIN — METHOCARBAMOL 500 MG: 500 TABLET ORAL at 05:34

## 2021-11-09 RX ADMIN — SUCRALFATE 1 G: 1 SUSPENSION ORAL at 15:38

## 2021-11-09 RX ADMIN — ACETAMINOPHEN 1000 MG: 500 TABLET ORAL at 05:33

## 2021-11-09 RX ADMIN — METOPROLOL SUCCINATE 25 MG: 25 TABLET, EXTENDED RELEASE ORAL at 05:33

## 2021-11-09 RX ADMIN — METHOCARBAMOL 500 MG: 500 TABLET ORAL at 15:38

## 2021-11-09 NOTE — DISCHARGE PLANNING
ATTN: Case Management  RE: Referral for Home Health    Reason for referral denial: Patient requesting [facility].              Unfortunately, we are not able to accept this referral for the reason listed above. If further clarity is needed, our Transitional Care Specialists are available to discuss any barriers to service at [phone].      We look forward to collaborating with you in the future,  [facility] Team

## 2021-11-09 NOTE — DISCHARGE PLANNING
Anticipated Discharge Disposition: SNF VS HH with Infusions      Action: CM RN met with pt at bedside to discuss choice. Choice form faxed to [facility]. Received a voicemail from pts wife, pt stating does not want RN TOSIN to speak with wife about plan of care.     Barriers to Discharge: acceptance to SNF     Plan: Case management to follow pt with any needs.     1300: Called [facility], verified they could accommodate IV infusions. Per Daina they have a bed available to today and can take pt. Requesting Covid swab prior to DC. Notified bedside RN and MD. Set up transport for 1800 with GMT by .     1430: Reached out to Charge RN to complete Covid swab

## 2021-11-09 NOTE — DISCHARGE PLANNING
Spoke To: Elvis  Agency/Facility Name: [facility]   Plan or Request: approved    Spoke To: Brit  Agency/Facility Name: [facility]  Plan or Request: Brit called for additional info, spoke with TOSIN Mike.

## 2021-11-09 NOTE — DISCHARGE PLANNING
Received Choice form at 0989  Agency/Facility Name: 1)[facility],3)[facility]  Referral sent per Choice form at [phone]-   Agency/Facility Name: [facility]  Plan or Request: pending safe d/c plan

## 2021-11-09 NOTE — DISCHARGE PLANNING
DC Transport Scheduled    Received request at: 1331    Transport Company Scheduled:  GMT    Scheduled Date: 11/9/21  Scheduled Time:1800    Destination: [address]    Notified care team of scheduled transport via Voalte.     If there are any changes needed to the DC transportation scheduled, please contact [facility] at ext. [phone] between the hours of 8333-1198 Mon-Fri. If outside those hours, contact the ED Case Manager at ext. [phone].

## 2021-11-09 NOTE — DISCHARGE SUMMARY
Discharge Summary    CHIEF COMPLAINT ON ADMISSION  No chief complaint on file.      Reason for Admission  AICD removal     Admission Date  11/2/2021    CODE STATUS  Full Code    HPI & HOSPITAL COURSE  This is a 71 y.o. male here with generalized weakness  70 yo man with DM, afib, BPH, CHF, AICD who admitted with dizziness and weakness concerning for stroke. MRI was negative and he was found with MSSA bacteremia. He had MRI of his abdomen and spine that were negative for infection. PAMELA showed mobile mass in right atrium. ID and cardiology were consulted. His ICD was removed 11/4. PICC was placed to continue ancef until 12/15/21. Home health has been ordered. Of note, he had multiple pains in his shoulders, chest and abdomen that appear to be MSK. His pain significantly improve with the addition of robaxin and tylenol.     Patient was further evaluated in the hospital and found to have MSSA bacteremia and also endocarditis as indicated above.  Patient had PICC line placed I will continue Ancef until 12/15/2021.  His medication will be given to him at [facility] facility [facility].  Furthermore patient will keep Andres catheter upon day of discharge and this will be removed when appropriate at skilled nursing facility [facility].  It should be noted as indicated above patient does have history of self-catheterization at home.  Facesheet with all necessary and appropriate medications was provided to skilled nursing facility.  Therefore, he is discharged in good and stable condition to skilled nursing facility.    The patient met 2-midnight criteria for an inpatient stay at the time of discharge.    Discharge Date  11/9/2021    FOLLOW UP ITEMS POST DISCHARGE  Participate at skilled nursing facility activities take all medication as prescribed    DISCHARGE DIAGNOSES  Principal Problem:    Acute bacterial endocarditis POA: Unknown  Active Problems:    Paroxysmal A-fib (HCC) (Chronic) POA: Yes    Chronic systolic heart  failure (HCC) (Chronic) POA: Yes    Type 2 diabetes mellitus without complication, with long-term current use of insulin (HCC) POA: Yes      Overview: Chronic condition treated with Glipizide and Lantus.      Sliding scale insulin during acute hospitalization.          AICD (automatic cardioverter/defibrillator) present POA: Yes      Overview: July 2021: [employer] D233 implanted by Dr. Isaac.    Generalized abdominal pain POA: Unknown  Resolved Problems:    * No resolved hospital problems. *      FOLLOW UP  Future Appointments   Date Time Provider Department Center   11/18/2021 10:00 AM ALVINO Cano [facility] None     [facility]  [address]  [address]  [facility] [address]  [phone]      As needed          [facility] care skilled nursing facility [facility]      MEDICATIONS ON DISCHARGE     Medication List      START taking these medications      Instructions   lidocaine 5 % Ptch  Start taking on: November 10, 2021  Commonly known as: LIDODERM   Place 2 Patches on the skin every 24 hours.  Dose: 2 Patch     meclizine 25 MG Tabs  Commonly known as: ANTIVERT   Take 1 Tablet by mouth 3 times a day as needed for Vertigo.  Dose: 25 mg     melatonin 5 mg Tabs   Take 1 Tablet by mouth every day.  Dose: 5 mg     methocarbamol 500 MG Tabs  Commonly known as: ROBAXIN   Take 1 Tablet by mouth every 8 hours.  Dose: 500 mg     sucralfate 1 GM Tabs  Commonly known as: CARAFATE   Take 1 Tablet by mouth 4 Times a Day,Before Meals and at Bedtime.  Dose: 1 g        CHANGE how you take these medications      Instructions   Tresiba FlexTouch 200 UNIT/ML Sopn  What changed:   · how much to take  · when to take this  Generic drug: Insulin Degludec   Inject 12 Units under the skin every evening.  Dose: 12 Units        CONTINUE taking these medications      Instructions   apixaban 5mg Tabs  Commonly known as: Eliquis   Take 1 Tablet by mouth 2 times a day.  Dose: 5 mg     * Blood Glucose Test Strips   verio  "test strips for glucose monitoring TID and PRN     * OneTouch Verio w/Device Kit   1 Device 3 times a day before meals.  Dose: 1 Device     bumetanide 0.5 MG Tabs  Commonly known as: BUMEX   Take 1 tablet by mouth every day.  Dose: 0.5 mg     digoxin 125 MCG Tabs  Commonly known as: LANOXIN   Take 1 tablet by mouth every day at 6 PM.  Dose: 125 mcg     docusate sodium 100 MG Caps  Commonly known as: COLACE   Take 1 capsule by mouth 2 times a day as needed for Constipation.  Dose: 100 mg     finasteride 5 MG Tabs  Commonly known as: PROSCAR   TAKE 1 TABLET BY MOUTH EVERY DAY     hydroxyurea 500 MG Caps  Commonly known as: HYDREA   Take 500 mg by mouth 2 Times a Day.  Dose: 500 mg     metoprolol SR 25 MG Tb24  Commonly known as: TOPROL XL   Take 1 tablet by mouth every evening.  Dose: 25 mg     nitroglycerin 0.4 MG Subl  Commonly known as: NITROSTAT   Place 1 Tablet under the tongue as needed for Chest Pain (or hypertension >180/110).  Dose: 0.4 mg     tamsulosin 0.4 MG capsule  Commonly known as: FLOMAX   Take 0.4 mg by mouth every day.  Dose: 0.4 mg     therapeutic multivitamin-minerals Tabs   Take 1 Tab by mouth every day.  Dose: 1 Tablet         * This list has 2 medication(s) that are the same as other medications prescribed for you. Read the directions carefully, and ask your doctor or other care provider to review them with you.            STOP taking these medications    aspirin EC 81 MG Tbec  Commonly known as: ECOTRIN     dicyclomine 10 MG Caps  Commonly known as: BENTYL            Allergies  Allergies   Allergen Reactions   • Doxycycline      Swelling lips and difficulties swallowing   • Atorvastatin    • Beta Adrenergic Blockers Unspecified     dizziness   • Metformin Unspecified and Palpitations     Cardiac effects  \"Similar to a heart attack, I was hospitalized\"   • Simvastatin      Other reaction(s): Other (Specify with Comments)  Myalgias  Myalgias   • Statins [Hmg-Coa-R Inhibitors]      Muscle ache, " joint pain       DIET  Orders Placed This Encounter   Procedures   • Diet Order Diet: Cardiac; Second Modifier: (optional): Consistent CHO (Diabetic)     Standing Status:   Standing     Number of Occurrences:   1     Order Specific Question:   Diet:     Answer:   Cardiac [6]     Order Specific Question:   Second Modifier: (optional)     Answer:   Consistent CHO (Diabetic) [4]       ACTIVITY  As tolerated.  Weight bearing as tolerated    CONSULTATIONS  Infectious disease-Dr. Asif  Cardiology-Dr. Florence    PROCEDURES  None    LABORATORY  Lab Results   Component Value Date    SODIUM 136 11/08/2021    POTASSIUM 3.7 11/08/2021    CHLORIDE 101 11/08/2021    CO2 24 11/08/2021    GLUCOSE 182 (H) 11/08/2021    BUN 9 11/08/2021    CREATININE 0.69 11/08/2021        Lab Results   Component Value Date    WBC 8.8 11/07/2021    HEMOGLOBIN 14.6 11/07/2021    HEMATOCRIT 43.5 11/07/2021    PLATELETCT 336 11/07/2021        Total time of the discharge process exceeds 35 minutes.

## 2021-11-09 NOTE — CARE PLAN
The patient is Watcher - Medium risk of patient condition declining or worsening    Shift Goals  Clinical Goals: comfort, prepare DC  Patient Goals: Pain management  Family Goals: KAL. No family present.     Progress made toward(s) clinical / shift goals:      Patient progressed in all areas of care throughout the night, required PRN pain medications x1, had no falls throughout the night, and slept good amount.

## 2021-11-09 NOTE — HOSPITAL COURSE
72 yo man with DM, afib, BPH, CHF, AICD who admitted with dizziness and weakness concerning for stroke. MRI was negative and he was found with MSSA bacteremia. He had MRI of his abdomen and spine that were negative for infection. PAMELA showed mobile mass in right atrium. ID and cardiology were consulted. His ICD was removed 11/4. PICC was placed to continue ancef until 12/15/21. Home health has been ordered. Of note, he had multiple pains in his shoulders, chest and abdomen that appear to be MSK. His pain significantly improve with the addition of robaxin and tylenol.

## 2021-11-10 NOTE — DISCHARGE INSTRUCTIONS
Discharge Instructions    Discharged to other by medical transportation with self. Discharged via wheelchair, hospital escort: Yes.  Special equipment needed: Not Applicable    Be sure to schedule a follow-up appointment with your primary care doctor or any specialists as instructed.     Discharge Plan:   Diet Plan: Discussed  Activity Level: Discussed  Confirmed Follow up Appointment: Patient to Call and Schedule Appointment  Confirmed Symptoms Management: Discussed  Medication Reconciliation Updated: Yes  Influenza Vaccine Indication: Patient Refuses    I understand that a diet low in cholesterol, fat, and sodium is recommended for good health. Unless I have been given specific instructions below for another diet, I accept this instruction as my diet prescription.   Other diet: cardiac     Special Instructions: None    · Is patient discharged on Warfarin / Coumadin?   No     Depression / Suicide Risk    As you are discharged from this [facility] facility, it is important to learn how to keep safe from harming yourself.    Recognize the warning signs:  · Abrupt changes in personality, positive or negative- including increase in energy   · Giving away possessions  · Change in eating patterns- significant weight changes-  positive or negative  · Change in sleeping patterns- unable to sleep or sleeping all the time   · Unwillingness or inability to communicate  · Depression  · Unusual sadness, discouragement and loneliness  · Talk of wanting to die  · Neglect of personal appearance   · Rebelliousness- reckless behavior  · Withdrawal from people/activities they love  · Confusion- inability to concentrate     If you or a loved one observes any of these behaviors or has concerns about self-harm, here's what you can do:  · Talk about it- your feelings and reasons for harming yourself  · Remove any means that you might use to hurt yourself (examples: pills, rope, extension cords, firearm)  · Get professional help from  the community (Mental Health, Substance Abuse, psychological counseling)  · Do not be alone:Call your Safe Contact- someone whom you trust who will be there for you.  · Call your local CRISIS HOTLINE 577-1024 or 463-661-9290  · Call your local Children's Mobile Crisis Response Team [place] (188) 443-9816 or www.Medrio  · Call the toll free National Suicide Prevention Hotlines   · National Suicide Prevention Lifeline 265-175-EDNJ (6741)  · [employer] Line Network 800-SUICIDE (355-5033)          Endocarditis    Endocarditis is an infection of the heart valves or an infection of the inner layer of the heart (endocardium). Endocarditis can cause growths on the heart valves or inside the heart. Over time, these growths can destroy heart tissue and cause heart failure or problems with the heart rhythm. They can also cause a stroke if they break away and block an artery in the brain. Early treatment offers the best chance for curing endocarditis and preventing complications.  What are the causes?  This condition may be caused by:  · Germs that normally live in or on your body. The germs that most commonly cause endocarditis are bacteria.  · Fungus.  · Cancer.  · Connective tissue disease.  What increases the risk?  This condition is more likely to develop in people who have:  · A heart defect.  · Artificial (prosthetic) heart valves.  · An abnormal or damaged heart valve.  · A history of endocarditis.  · IV drug abuse.  Having certain procedures may also increase the risk of germs getting into the heart or bloodstream.  What are the signs or symptoms?  Symptoms of this condition may start suddenly, or they may start slowly and gradually get worse. Symptoms include:  · Fever.  · Chills.  · Night sweats.  · Muscle aches.  · Fatigue.  · Weakness.  · Shortness of breath.  · Chest pain.  · Blood spots in the eyes.  · Bleeding under the fingernails or toenails.  · Painless red spots on the palms.  · Painful  lumps in the fingertips or toes.  · Swelling in the feet or ankles.  How is this diagnosed?  This condition may be diagnosed based on:  · A physical exam. Your health care provider will listen to your heart to check for abnormal heart sounds (murmur). He or she may also use a scope to check for bleeding at the back of your eyes (retinas).  · Tests. They may include:  ? Blood tests to look for the germs that cause endocarditis.  ? Imaging tests. These include a chest X-ray, CT scan, or echocardiogram. A type of echocardiogram called a transesophageal echocardiogram may be done to look at heart valves more closely.  How is this treated?  Treatment for this condition depends on the cause of the endocarditis. Treatment may include:  · Antibiotic medicines. These may be given through an IV line or taken by mouth. You may need to be on more than one antibiotic medicine.  · Surgery to replace your heart valve. You may need surgery if:  ? The endocarditis does not respond to treatment.  ? You develop complications.  ? Your heart valve is severely damaged.  Follow these instructions at home:  Medicines  · Take over-the-counter and prescription medicines only as told by your health care provider.  · If you were prescribed an antibiotic medicine, take it as told by your health care provider. Do not stop taking the antibiotic even if you start to feel better. You may need to be on intravenous or oral antibiotics for several weeks.  · Do not use IV drugs unless it is part of your medical treatment.  Lifestyle  · Do not get tattoos or body piercings.  · Practice good oral hygiene. This includes:  ? Brushing and flossing regularly.  ? Scheduling routine dental appointments.  · Do not use any products that contain nicotine or tobacco, such as cigarettes, e-cigarettes, and chewing tobacco. If you need help quitting, ask your health care provider.  · If you drink alcohol:  ? Limit how much you use to:  § 0-1 drink a day for  "women.  § 0-2 drinks a day for men.  ? Be aware of how much alcohol is in your drink. In the [place] one drink equals one typical bottle of beer (12 oz), one-half glass of wine (5 oz), or one shot of hard liquor (1½ oz).  General instructions  · Let your health care provider know before you have any dental or surgical procedures. You may need to take antibiotics before the procedure.  · Tell all of your health care providers, including your dentist, that you have had endocarditis.  · Gradually resume your usual activities.  · Keep all follow-up visits as told by your health care provider. This is important.  Contact a health care provider if:  · You have a fever.  · Your symptoms do not improve.  · Your symptoms get worse.  · Your symptoms come back.  Get help right away if:  · You have trouble breathing.  · You have chest pain.  · You have any symptoms of a stroke. \"BE FAST\" is an easy way to remember the main warning signs of a stroke:  ? B - Balance. Signs are dizziness, sudden trouble walking, or loss of balance.  ? E - Eyes. Signs are trouble seeing or a sudden change in vision.  ? F - Face. Signs are sudden weakness or numbness of the face, or the face or eyelid drooping on one side.  ? A - Arms. Signs are weakness or numbness in an arm. This happens suddenly and usually on one side of the body.  ? S - Speech. Signs are sudden trouble speaking, slurred speech, or trouble understanding what people say.  ? T - Time. Time to call emergency services. Write down what time symptoms started.  · You have other signs of a stroke, such as:  ? A sudden, severe headache with no known cause.  ? Nausea or vomiting.  ? Seizure.  These symptoms may represent a serious problem that is an emergency. Do not wait to see if the symptoms will go away. Get medical help right away. Call your local emergency services (911 in the [place] Do not drive yourself to the hospital.  Summary  · Endocarditis is an infection of the heart valves or " inner layer of the heart (endocardium). It is caused by bacteria or a fungus.  · Having certain heart conditions or procedures may increase the risk of endocarditis.  · Antibiotics are an important treatment for endocarditis. Take these medicines as told by your health care provider. Do not stop taking them even if you start to feel better.  · Tell all of your health care providers, including your dentist, that you have had endocarditis.  This information is not intended to replace advice given to you by your health care provider. Make sure you discuss any questions you have with your health care provider.  Document Released: 12/18/2006 Document Revised: 08/05/2019 Document Reviewed: 08/05/2019  [employer] Patient Education © 2020 [employer]      Lidocaine dermal patch  What is this medicine?  LIDOCAINE (LYE martinez walter) causes loss of feeling in the skin and surrounding area. The medicine helps treat pain, including nerve pain.  This medicine may be used for other purposes; ask your health care provider or pharmacist if you have questions.  COMMON BRAND NAME(S): Aspercreme with Lidocaine, GEN7T, Lidocare, Lidoderm, ZTlido  What should I tell my health care provider before I take this medicine?  They need to know if you have any of these conditions:  · heart disease  · history of irregular heart beat  · liver disease  · skin conditions or sensitivity  · skin infection  · an unusual or allergic reaction to lidocaine, parabens, other medicines, foods, dyes, or preservatives  · pregnant or trying to get pregnant  · breast-feeding  How should I use this medicine?  This medicine is for external use only. Follow the directions on the prescription label or package.  Talk to your pediatrician regarding the use of this medicine in children. Special care may be needed.  Overdosage: If you think you have taken too much of this medicine contact a poison control center or emergency room at once.  NOTE: This medicine is only for  you. Do not share this medicine with others.  What if I miss a dose?  If you miss a dose, take it as soon as you can. If it is almost time for your next dose, take only that dose. Do not take double or extra doses.  What may interact with this medicine?  Do not take this medicine with any of the following medications:  · certain medicines for irregular heart beat  · MAOIs like Carbex, Eldepryl, Marplan, Nardil, and Parnate  This medicine may also interact with the following medications:  · other local anesthetics like pramoxine, tetracaine  Do not use any other skin products on the affected area without asking your doctor or health care professional.  This list may not describe all possible interactions. Give your health care provider a list of all the medicines, herbs, non-prescription drugs, or dietary supplements you use. Also tell them if you smoke, drink alcohol, or use illegal drugs. Some items may interact with your medicine.  What should I watch for while using this medicine?  Tell your doctor or healthcare professional if your symptoms do not start to get better or if they get worse.  Be careful to avoid injury while the area is numb from the medicine, and you are not aware of pain.  If you are going to need surgery, a MRI, CT scan, or other procedure, tell your doctor that you are using this medicine. You may need to remove this patch before the procedure.  Do not get this medicine in your eyes. If you do, rinse out with plenty of cool tap water.  This medicine can make certain skin conditions worse. Only use it for conditions for which your doctor or health care professional has prescribed.  What side effects may I notice from receiving this medicine?  Side effects that you should report to your doctor or health care professional as soon as possible:  · allergic reactions like skin rash, itching or hives, swelling of the face, lips, or tongue  · breathing problems  · chest pain or chest  tightness  · dizzines  Side effects that usually do not require medical attention (report to your doctor or health care professional if they continue or are bothersome):  · tingling, numbness at site where applied  This list may not describe all possible side effects. Call your doctor for medical advice about side effects. You may report side effects to FDA at 8-936-OYX-3204.  Where should I keep my medicine?  Keep out of the reach of children.  See product for storage instructions. Each product may have different instructions.  NOTE: This sheet is a summary. It may not cover all possible information. If you have questions about this medicine, talk to your doctor, pharmacist, or health care provider.  © 2020 [employer]/Gold Standard (2018-09-19 22:50:48)      Meclizine tablets or capsules  What is this medicine?  MECLIZINE (MEK li zeen) is an antihistamine. It is used to prevent nausea, vomiting, or dizziness caused by motion sickness. It is also used to prevent and treat vertigo (extreme dizziness or a feeling that you or your surroundings are tilting or spinning around).  This medicine may be used for other purposes; ask your health care provider or pharmacist if you have questions.  COMMON BRAND NAME(S): Antivert, Dramamine Less Drowsy, Dramamine-N, Medivert, Meni-D  What should I tell my health care provider before I take this medicine?  They need to know if you have any of these conditions:  · glaucoma  · lung or breathing disease, like asthma  · problems urinating  · prostate disease  · stomach or intestine problems  · an unusual or allergic reaction to meclizine, other medicines, foods, dyes, or preservatives  · pregnant or trying to get pregnant  · breast-feeding  How should I use this medicine?  Take this medicine by mouth with a glass of water. Follow the directions on the prescription label. If you are using this medicine to prevent motion sickness, take the dose at least 1 hour before travel. If it upsets  your stomach, take it with food or milk. Take your doses at regular intervals. Do not take your medicine more often than directed.  Talk to your pediatrician regarding the use of this medicine in children. Special care may be needed.  Overdosage: If you think you have taken too much of this medicine contact a poison control center or emergency room at once.  NOTE: This medicine is only for you. Do not share this medicine with others.  What if I miss a dose?  If you miss a dose, take it as soon as you can. If it is almost time for your next dose, take only that dose. Do not take double or extra doses.  What may interact with this medicine?  Do not take this medicine with any of the following medications:  · MAOIs like Carbex, Eldepryl, Marplan, Nardil, and Parnate  This medicine may also interact with the following medications:  · alcohol  · antihistamines for allergy, cough and cold  · certain medicines for anxiety or sleep  · certain medicines for depression, like amitriptyline, fluoxetine, sertraline  · certain medicines for seizures like phenobarbital, primidone  · general anesthetics like halothane, isoflurane, methoxyflurane, propofol  · local anesthetics like lidocaine, pramoxine, tetracaine  · medicines that relax muscles for surgery  · narcotic medicines for pain  · phenothiazines like chlorpromazine, mesoridazine, prochlorperazine, thioridazine  This list may not describe all possible interactions. Give your health care provider a list of all the medicines, herbs, non-prescription drugs, or dietary supplements you use. Also tell them if you smoke, drink alcohol, or use illegal drugs. Some items may interact with your medicine.  What should I watch for while using this medicine?  Tell your doctor or healthcare professional if your symptoms do not start to get better or if they get worse.  You may get drowsy or dizzy. Do not drive, use machinery, or do anything that needs mental alertness until you know how  this medicine affects you. Do not stand or sit up quickly, especially if you are an older patient. This reduces the risk of dizzy or fainting spells. Alcohol may interfere with the effect of this medicine. Avoid alcoholic drinks.  Your mouth may get dry. Chewing sugarless gum or sucking hard candy, and drinking plenty of water may help. Contact your doctor if the problem does not go away or is severe.  This medicine may cause dry eyes and blurred vision. If you wear contact lenses you may feel some discomfort. Lubricating drops may help. See your eye doctor if the problem does not go away or is severe.  What side effects may I notice from receiving this medicine?  Side effects that you should report to your doctor or health care professional as soon as possible:  · feeling faint or lightheaded, falls  · fast, irregular heartbeat  Side effects that usually do not require medical attention (report to your doctor or health care professional if they continue or are bothersome):  · constipation  · headache  · trouble passing urine or change in the amount of urine  · trouble sleeping  · upset stomach  This list may not describe all possible side effects. Call your doctor for medical advice about side effects. You may report side effects to FDA at 7-360-FDA-2867.  Where should I keep my medicine?  Keep out of the reach of children.  Store at room temperature between 15 and 30 degrees C (59 and 86 degrees F). Keep container tightly closed. Throw away any unused medicine after the expiration date.  NOTE: This sheet is a summary. It may not cover all possible information. If you have questions about this medicine, talk to your doctor, pharmacist, or health care provider.  © 2020 [employer]/Gold Standard (2017-01-18 19:41:02)      Melatonin oral solid dosage forms  What is this medicine?  MELATONIN ([facility]) is a dietary supplement. It is mostly promoted to help maintain normal sleep patterns. The FDA has not approved  this supplement for any medical use.  This supplement may be used for other purposes; ask your health care provider or pharmacist if you have questions.  This medicine may be used for other purposes; ask your health care provider or pharmacist if you have questions.  COMMON BRAND NAME(S): Melatonex  What should I tell my health care provider before I take this medicine?  They need to know if you have any of these conditions:  · cancer  · depression or mental illness  · diabetes  · hormone problems  · if you often drink alcohol  · immune system problems  · liver disease  · lung or breathing disease, like asthma  · organ transplant  · seizure disorder  · an unusual or allergic reaction to melatonin, other medicines, foods, dyes, or preservatives  · pregnant or trying to get pregnant  · breast-feeding  How should I use this medicine?  Take this supplement by mouth with a glass of water. Do not take with food. This supplement is usually taken 1 or 2 hours before bedtime. After taking this supplement, limit your activities to those needed to prepare for bed. Some products may be chewed or dissolved in the mouth before swallowing. Some tablets or capsules must be swallowed whole; do not cut, crush or chew. Follow the directions on the package labeling, or take as directed by your health care professional. Do not take this supplement more often than directed.  Talk to your pediatrician regarding the use of this supplement in children. Special care may be needed. This supplement is not recommended for use in children without a prescription.  Overdosage: If you think you have taken too much of this medicine contact a poison control center or emergency room at once.  NOTE: This medicine is only for you. Do not share this medicine with others.  What if I miss a dose?  If you miss taking your dose at the usual time, skip that dose. If it is almost time for your next dose, take only that dose. Do not take double or extra  "doses.  What may interact with this medicine?  Do not take this medicine with any of the following medications:  · fluvoxamine  · ramelteon  · tasimelteon  This medicine may also interact with the following medications:  · alcohol  · caffeine  · carbamazepine  · certain antibiotics like ciprofloxacin  · certain medicines for depression, anxiety, or psychotic disturbances  · cimetidine  · female hormones, like estrogens and birth control pills, patches, rings, or injections  · methoxsalen  · nifedipine  · other medications for sleep  · other herbal or dietary supplements  · phenobarbital  · rifampin  · smoking tobacco  · tamoxifen  · warfarin  This list may not describe all possible interactions. Give your health care provider a list of all the medicines, herbs, non-prescription drugs, or dietary supplements you use. Also tell them if you smoke, drink alcohol, or use illegal drugs. Some items may interact with your medicine.  What should I watch for while using this medicine?  See your doctor if your symptoms do not get better or if they get worse. Do not take this supplement for more than 2 weeks unless your doctor tells you to.  You may get drowsy or dizzy. Do not drive, use machinery, or do anything that needs mental alertness until you know how this medicine affects you. Do not stand or sit up quickly, especially if you are an older patient. This reduces the risk of dizzy or fainting spells. Alcohol may interfere with the effect of this medicine. Avoid alcoholic drinks.  After taking this medicine, you may get up out of bed and do an activity that you do not know you are doing. The next morning, you may have no memory of this. Activities include driving a car (\"sleep-driving\"), making and eating food, talking on the phone, sexual activity, and sleep-walking. Serious injuries have occurred. Call your doctor right away if you find out you have done any of these activities. Do not take this medicine if you have used " alcohol that evening. Do not take it if you have taken another medicine for sleep. The risk of doing these sleep-related activities is higher.  Talk to your doctor before you use this supplement if you are currently being treated for an emotional, mental, or sleep problem. This medicine may interfere with your treatment.  Herbal or dietary supplements are not regulated like medicines. Rigid  standards are not required for dietary supplements. The purity and strength of these products can vary. The safety and effect of this dietary supplement for a certain disease or illness is not well known. This product is not intended to diagnose, treat, cure or prevent any disease.  The Food and Drug Administration suggests the following to help consumers protect themselves:  · Always read product labels and follow directions.  · Natural does not mean a product is safe for humans to take.  · Look for products that include USP after the ingredient name. This means that the  followed the standards of the [place] Pharmacopoeia.  · Supplements made or sold by a nationally known food or drug company are more likely to be made under tight controls. You can write to the company for more information about how the product was made.  What side effects may I notice from receiving this medicine?  Side effects that you should report to your doctor or health care professional as soon as possible:  · allergic reactions like skin rash, itching or hives, swelling of the face, lips, or tongue  · breathing problems  · confusion  · depressed mood, irritable, or other changes in moods or behaviors  · feeling faint or lightheaded, falls  · increased blood pressure  · irregular or missed menstrual periods  · signs and symptoms of liver injury like dark yellow or brown urine; general ill feeling or flu-like symptoms; light-colored stools; loss of appetite; nausea; right upper belly pain; unusually weak or tired; yellowing of the  eyes or skin  · trouble staying awake or alert during the day  · unusual activities while you are still asleep like driving, eating, making phone calls  · unusual bleeding or bruising  Side effects that usually do not require medical attention (report to your doctor or health care professional if they continue or are bothersome):  · dizziness  · drowsiness  · headache  · hot flashes  · nausea  · tiredness  · unusual dreams or nightmares  · upset stomach  This list may not describe all possible side effects. Call your doctor for medical advice about side effects. You may report side effects to FDA at 2-377-NFD-6400.  Where should I keep my medicine?  Keep out of the reach of children.  Store at room temperature or as directed on the package label. Protect from moisture. Throw away any unused supplement after the expiration date.  NOTE: This sheet is a summary. It may not cover all possible information. If you have questions about this medicine, talk to your doctor, pharmacist, or health care provider.  © 2020 [employer]vier/Gold Standard (2019-06-14 12:11:58)      Methocarbamol tablets  What is this medicine?  METHOCARBAMOL (meth oh ESTELLA ba mole) helps to relieve pain and stiffness in muscles caused by strains, sprains, or other injury to your muscles.  This medicine may be used for other purposes; ask your health care provider or pharmacist if you have questions.  COMMON BRAND NAME(S): Robaxin  What should I tell my health care provider before I take this medicine?  They need to know if you have any of these conditions:  · kidney disease  · seizures  · an unusual or allergic reaction to methocarbamol, other medicines, foods, dyes, or preservatives  · pregnant or trying to get pregnant  · breast-feeding  How should I use this medicine?  Take this medicine by mouth with a full glass of water. Follow the directions on the prescription label. Take your medicine at regular intervals. Do not take your medicine more often than  directed.  Talk to your pediatrician regarding the use of this medicine in children. Special care may be needed.  Overdosage: If you think you have taken too much of this medicine contact a poison control center or emergency room at once.  NOTE: This medicine is only for you. Do not share this medicine with others.  What if I miss a dose?  If you miss a dose, take it as soon as you can. If it is almost time for your next dose, take only the next dose. Do not take double or extra doses.  What may interact with this medicine?  Do not take this medication with any of the following medicines:  · narcotic medicines for cough  This medicine may also interact with the following medications:  · alcohol  · antihistamines for allergy, cough and cold  · certain medicines for anxiety or sleep  · certain medicines for depression like amitriptyline, fluoxetine, sertraline  · certain medicines for seizures like phenobarbital, primidone  · cholinesterase inhibitors like neostigmine, ambenonium, and pyridostigmine bromide  · general anesthetics like halothane, isoflurane, methoxyflurane, propofol  · local anesthetics like lidocaine, pramoxine, tetracaine  · medicines that relax muscles for surgery  · narcotic medicines for pain  · phenothiazines like chlorpromazine, mesoridazine, prochlorperazine, thioridazine  This list may not describe all possible interactions. Give your health care provider a list of all the medicines, herbs, non-prescription drugs, or dietary supplements you use. Also tell them if you smoke, drink alcohol, or use illegal drugs. Some items may interact with your medicine.  What should I watch for while using this medicine?  Tell your doctor or health care professional if your symptoms do not start to get better or if they get worse.  You may get drowsy or dizzy. Do not drive, use machinery, or do anything that needs mental alertness until you know how this medicine affects you. Do not stand or sit up quickly,  especially if you are an older patient. This reduces the risk of dizzy or fainting spells. Alcohol may interfere with the effect of this medicine. Avoid alcoholic drinks.  If you are taking another medicine that also causes drowsiness, you may have more side effects. Give your health care provider a list of all medicines you use. Your doctor will tell you how much medicine to take. Do not take more medicine than directed. Call emergency for help if you have problems breathing or unusual sleepiness.  What side effects may I notice from receiving this medicine?  Side effects that you should report to your doctor or health care professional as soon as possible:  · allergic reactions like skin rash, itching or hives, swelling of the face, lips, or tongue  · breathing problems  · confusion  · seizures  · unusually weak or tired  Side effects that usually do not require medical attention (report to your doctor or health care professional if they continue or are bothersome):  · dizziness  · headache  · metallic taste  · tiredness  · upset stomach  This list may not describe all possible side effects. Call your doctor for medical advice about side effects. You may report side effects to FDA at 2-339-FDA-1686.  Where should I keep my medicine?  Keep out of the reach of children.  Store at room temperature between 20 and 25 degrees C (68 and 77 degrees F). Keep container tightly closed. Throw away any unused medicine after the expiration date.  NOTE: This sheet is a summary. It may not cover all possible information. If you have questions about this medicine, talk to your doctor, pharmacist, or health care provider.  © 2020 Elsevier/Gold Standard (2016-09-27 13:11:54)      Sucralfate tablets  What is this medicine?  SUCRALFATE (LUH alpesh fate) helps to treat ulcers of the intestine.  This medicine may be used for other purposes; ask your health care provider or pharmacist if you have questions.  COMMON BRAND NAME(S):  Carafate  What should I tell my health care provider before I take this medicine?  They need to know if you have any of these conditions:  · kidney disease  · an unusual or allergic reaction to sucralfate, other medicines, foods, dyes, or preservatives  · pregnant or trying to get pregnant  · breast-feeding  How should I use this medicine?  Take this medicine by mouth with a glass of water. Follow the directions on the prescription label. This medicine works best if you take it on an empty stomach, 1 hour before meals. Take your doses at regular intervals. Do not take your medicine more often than directed. Do not stop taking except on your doctor's advice.  Talk to your pediatrician regarding the use of this medicine in children. Special care may be needed.  Overdosage: If you think you have taken too much of this medicine contact a poison control center or emergency room at once.  NOTE: This medicine is only for you. Do not share this medicine with others.  What if I miss a dose?  If you miss a dose, take it as soon as you can. If it is almost time for your next dose, take only that dose. Do not take double or extra doses.  What may interact with this medicine?  · antacid  · cimetidine  · digoxin  · ketoconazole  · phenytoin  · quinidine  · ranitidine  · some antibiotics like ciprofloxacin, norfloxacin, and ofloxacin  · theophylline  · thyroid hormones  · warfarin  This list may not describe all possible interactions. Give your health care provider a list of all the medicines, herbs, non-prescription drugs, or dietary supplements you use. Also tell them if you smoke, drink alcohol, or use illegal drugs. Some items may interact with your medicine.  What should I watch for while using this medicine?  Visit your doctor or health care professional for regular check ups. Let your doctor know if your symptoms do not improve or if you feel worse.  Antacids should not be taken within one half hour before or after this  medicine.  What side effects may I notice from receiving this medicine?  Side effects that you should report to your doctor or health care professional as soon as possible:  · allergic reactions like skin rash, itching or hives, swelling of the face, lips, or tongue  · difficulty breathing  Side effects that usually do not require medical attention (report to your doctor or health care professional if they continue or are bothersome):  · back pain  · Constipation  · drowsy, dizzy  · dry mouth  · headache  · stomach upset, gas  · trouble sleeping  This list may not describe all possible side effects. Call your doctor for medical advice about side effects. You may report side effects to FDA at 9-016-CIX-7671.  Where should I keep my medicine?  Keep out of the reach of children.  Store at room temperature between 15 and 30 degrees C (59 and 86 degrees F). Keep container tightly closed. Throw away any unused medicine after the expiration date.  NOTE: This sheet is a summary. It may not cover all possible information. If you have questions about this medicine, talk to your doctor, pharmacist, or health care provider.  © 2020 Elsevier/Gold Standard (2009-08-19 15:46:20)      PICC Home Care Guide    A peripherally inserted central catheter (PICC) is a form of IV access that allows medicines and IV fluids to be quickly distributed throughout the body. The PICC is a long, thin, flexible tube (catheter) that is inserted into a vein in the upper arm. The catheter ends in a large vein in the chest (superior vena cava, or SVC). After the PICC is inserted, a chest X-ray may be done to make sure that it is in the correct place.  A PICC may be placed for different reasons, such as:  · To give medicines and liquid nutrition.  · To give IV fluids and blood products.  · If there is trouble placing a peripheral intravenous (PIV) catheter.  If taken care of properly, a PICC can remain in place for several months. Having a PICC can also  allow a person to go home from the hospital sooner. Medicine and PICC care can be managed at home by a family member, caregiver, or home health care team.  What are the risks?  Generally, having a PICC is safe. However, problems may occur, including:  · A blood clot (thrombus) forming in or at the tip of the PICC.  · A blood clot forming in a vein (deep vein thrombosis) or traveling to the lung (pulmonary embolism).  · Inflammation of the vein (phlebitis) in which the PICC is placed.  · Infection. Central line associated blood stream infection (CLABSI) is a serious infection that often requires hospitalization.  · PICC movement (malposition). The PICC tip may move from its original position due to excessive physical activity, forceful coughing, sneezing, or vomiting.  · A break or cut in the PICC. It is important not to use scissors near the PICC.  · Nerve or tendon irritation or injury during PICC insertion.  How to take care of your PICC  Preventing problems  · You and any caregivers should wash your hands often with soap. Wash hands:  ? Before touching the PICC line or the infusion device.  ? Before changing a bandage (dressing).  · Flush the PICC as told by your health care provider. Let your health care provider know right away if the PICC is hard to flush or does not flush. Do not use force to flush the PICC.  · Do not use a syringe that is less than 10 mL to flush the PICC.  · Avoid blood pressure checks on the arm in which the PICC is placed.  · Never pull or tug on the PICC.  · Do not take the PICC out yourself. Only a trained clinical professional should remove the PICC.  · Use clean and sterile supplies only. Keep the supplies in a dry place. Do not reuse needles, syringes, or any other supplies. Doing that can lead to infection.  · Keep pets and children away from your PICC line.  · Check the PICC insertion site every day for signs of infection. Check for:  ? Leakage.  ? Redness, swelling, or  pain.  ? Fluid or blood.  ? Warmth.  ? Pus or a bad smell.  PICC dressing care  · Keep your PICC bandage (dressing) clean and dry to prevent infection.  · Do not take baths, swim, or use a hot tub until your health care provider approves. Ask your health care provider if you can take showers. You may only be allowed to take sponge baths for bathing. When you are allowed to shower:  ? Ask your health care provider to teach you how to wrap the PICC line.  ? Cover the PICC line with clear plastic wrap and tape to keep it dry while showering.  · Follow instructions from your health care provider about how to take care of your insertion site and dressing. Make sure you:  ? Wash your hands with soap and water before you change your bandage (dressing). If soap and water are not available, use hand .  ? Change your dressing as told by your health care provider.  ? Leave stitches (sutures), skin glue, or adhesive strips in place. These skin closures may need to stay in place for 2 weeks or longer. If adhesive strip edges start to loosen and curl up, you may trim the loose edges. Do not remove adhesive strips completely unless your health care provider tells you to do that.  · Change your PICC dressing if it becomes loose or wet.  General instructions    · Carry your PICC identification card or wear a medical alert bracelet at all times.  · Keep the tube clamped at all times, unless it is being used.  · Carry a smooth-edge clamp with you at all times to place on the tube if it breaks.  · Do not use scissors or sharp objects near the tube.  · You may bend your arm and move it freely. If your PICC is near or at the bend of your elbow, avoid activity with repeated motion at the elbow.  · Avoid lifting heavy objects as told by your health care provider.  · Keep all follow-up visits as told by your health care provider. This is important.  Disposal of supplies  · Throw away any syringes in a disposal container that is  "meant for sharp items (sharps container). You can buy a sharps container from a pharmacy, or you can make one by using an empty hard plastic bottle with a cover.  · Place any used dressings or infusion bags into a plastic bag. Throw that bag in the trash.  Contact a health care provider if:  · You have pain in your arm, ear, face, or teeth.  · You have a fever or chills.  · You have redness, swelling, or pain around the insertion site.  · You have fluid or blood coming from the insertion site.  · Your insertion site feels warm to the touch.  · You have pus or a bad smell coming from the insertion site.  · Your skin feels hard and raised around the insertion site.  Get help right away if:  · Your PICC is accidentally pulled all the way out. If this happens, cover the insertion site with a bandage or gauze dressing. Do not throw the PICC away. Your health care provider will need to check it.  · Your PICC was tugged or pulled and has partially come out. Do not  push the PICC back in.  · You cannot flush the PICC, it is hard to flush, or the PICC leaks around the insertion site when it is flushed.  · You hear a \"flushing\" sound when the PICC is flushed.  · You feel your heart racing or skipping beats.  · There is a hole or tear in the PICC.  · You have swelling in the arm in which the PICC was inserted.  · You have a red streak going up your arm from where the PICC was inserted.  Summary  · A peripherally inserted central catheter (PICC) is a long, thin, flexible tube (catheter) that is inserted into a vein in the upper arm.  · The PICC is inserted using a sterile technique by a specially trained nurse or physician. Only a trained clinical professional should remove it.  · Keep your PICC identification card with you at all times.  · Avoid blood pressure checks on the arm in which the PICC is placed.  · If cared for properly, a PICC can remain in place for several months. Having a PICC can also allow a person to go home " "from the hospital sooner.  This information is not intended to replace advice given to you by your health care provider. Make sure you discuss any questions you have with your health care provider.  Document Released: 06/23/2004 Document Revised: 11/30/2018 Document Reviewed: 01/20/2018  [employer] Patient Education © 2020 [employer]        PICC Line Instructions   How to Care for your PICC Do not take a bath, swim, or use hot tubs when you have a PICC. Cover PICC line with clear plastic wrap and tape to keep it dry while showering. Check the PICC insertion site daily for leakage, redness, swelling, or pain. Flush the PICC as directed by your health care provider. Let your health care provider know right away if the PICC is difficult to flush or does not flush. Do not use force to flush the PICC. Do not use a syringe that is less than 10 mL to flush the PICC. Avoid blood pressure checks on the arm with the PICC. Do not take the PICC out yourself. Only a trained clinical professional should remove the PICC. Make sure you or anyone who access your PICC Line washes their hands before using the line. Make sure the hub of the line is \"scrubbed\" prior to using the line. Dressing Changes Change the PICC dressing as instructed by your health care provider. Change your PICC dressing if it becomes loose or wet. When to seek medical attention PICC is accidentally pulled all the way out. There is any type of drainage, redness, or swelling where the PICC enters the skin. Noticeable increase in arm circumference due to swelling of arm. You cannot flush the PICC, it is difficult to flush, or the PICC leaks around the insertion site when it is flushed. You hear a \"flushing\" sound when the PICC is flushed. You notice a hole or tear in the PICC. You develop chills or a fever.  "

## 2021-11-10 NOTE — PROGRESS NOTES
[facility] called-running 1 hr behind. Patient to discharge closer to 7 pm. [facility] called-[facility] patient arriving between 730-800 pm.

## 2021-11-10 NOTE — PROGRESS NOTES
Patient A&Ox4, on room air, discharged to [facility] with PICC line and Andres in place. Patient taken to SNF via [facility]-copy of transporter license copied and placed in chart. Patient had tele monitor removed, monitors notified. Patient had AVS reviewed, vocalized understanding, signed, dated, and placed in chart. Patient sent with all belongings. Patient discharged.

## 2021-11-10 NOTE — PROGRESS NOTES
Monitor summary:  SR [id number], down to 32 nonsustaining  PVC, coup, trigem  0.20/0.09/0.38

## 2021-11-11 LAB — DSDNA IGG TITR SER CLIF: ABNORMAL {TITER}

## 2021-11-18 ENCOUNTER — TELEPHONE (OUTPATIENT)
Dept: INFECTIOUS DISEASES | Facility: MEDICAL CENTER | Age: 71
End: 2021-11-18

## 2021-11-18 ENCOUNTER — TELEPHONE (OUTPATIENT)
Dept: CARDIOLOGY | Facility: MEDICAL CENTER | Age: 71
End: 2021-11-18

## 2021-11-18 NOTE — TELEPHONE ENCOUNTER
Received fax from [employer] pt assistance [employer]. Pt's Eliquis assistance is due for renewal for next year. Provider portion completed and faxed to [phone]. Receipt confirmed. [employer] message sent to pt to complete the pt portion.

## 2021-11-18 NOTE — TELEPHONE ENCOUNTER
[facility] to [facility] hospital [facility], EOT is 12/15/2021, no answer when dialing number [phone] for

## 2021-12-15 ENCOUNTER — HOME CARE VISIT (OUTPATIENT)
Dept: HOME HEALTH SERVICES | Facility: HOME HEALTHCARE | Age: 71
End: 2021-12-15

## 2021-12-20 ENCOUNTER — TELEPHONE (OUTPATIENT)
Dept: INFECTIOUS DISEASES | Facility: MEDICAL CENTER | Age: 71
End: 2021-12-20

## 2021-12-20 NOTE — TELEPHONE ENCOUNTER
Contacted patient for update on condition after completion of IV abx 12/15, pt states there are no concerns at moment. Per pt he has been d/c from rehab and will be seeing his cardiologist soon. Gave pt phone number to [facility] clinic instructed him to contact in future if any issues.

## 2022-01-04 ENCOUNTER — OFFICE VISIT (OUTPATIENT)
Dept: CARDIOLOGY | Facility: MEDICAL CENTER | Age: 72
End: 2022-01-04
Payer: MEDICARE

## 2022-01-04 VITALS
HEART RATE: 83 BPM | DIASTOLIC BLOOD PRESSURE: 62 MMHG | HEIGHT: 75 IN | BODY MASS INDEX: 22.01 KG/M2 | OXYGEN SATURATION: 97 % | WEIGHT: 177 LBS | SYSTOLIC BLOOD PRESSURE: 102 MMHG

## 2022-01-04 DIAGNOSIS — I48.0 PAROXYSMAL A-FIB (HCC): ICD-10-CM

## 2022-01-04 DIAGNOSIS — I51.89 RIGHT ATRIAL MASS: ICD-10-CM

## 2022-01-04 LAB — EKG IMPRESSION: NORMAL

## 2022-01-04 PROCEDURE — 93000 ELECTROCARDIOGRAM COMPLETE: CPT | Performed by: INTERNAL MEDICINE

## 2022-01-04 PROCEDURE — 99214 OFFICE O/P EST MOD 30 MIN: CPT | Mod: 24 | Performed by: INTERNAL MEDICINE

## 2022-01-04 ASSESSMENT — FIBROSIS 4 INDEX: FIB4 SCORE: 1.17
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
## 2022-01-07 ENCOUNTER — HOSPITAL ENCOUNTER (OUTPATIENT)
Dept: CARDIOLOGY | Facility: MEDICAL CENTER | Age: 72
End: 2022-01-07
Attending: INTERNAL MEDICINE
Payer: MEDICARE

## 2022-01-07 DIAGNOSIS — I51.89 RIGHT ATRIAL MASS: ICD-10-CM

## 2022-01-07 LAB
LV EJECT FRACT  99904: 15
LV EJECT FRACT MOD 2C 99903: 29.42
LV EJECT FRACT MOD 4C 99902: 17.85
LV EJECT FRACT MOD BP 99901: 22.86

## 2022-01-07 PROCEDURE — 93306 TTE W/DOPPLER COMPLETE: CPT

## 2022-01-07 PROCEDURE — 93306 TTE W/DOPPLER COMPLETE: CPT | Mod: 26 | Performed by: INTERNAL MEDICINE

## 2022-01-07 PROCEDURE — 700117 HCHG RX CONTRAST REV CODE 255: Performed by: INTERNAL MEDICINE

## 2022-01-07 RX ADMIN — HUMAN ALBUMIN MICROSPHERES AND PERFLUTREN 3 ML: 10; .22 INJECTION, SOLUTION INTRAVENOUS at 13:00

## 2022-01-07 NOTE — RESULT ENCOUNTER NOTE
Your echocardiogram still shows the there is a mass in your right atrium which was present in November, please keep your follow up appointment in February with Dr Morales to discuss further workup if needed.

## 2022-01-10 ENCOUNTER — HOSPITAL ENCOUNTER (OUTPATIENT)
Dept: LAB | Facility: MEDICAL CENTER | Age: 72
End: 2022-01-10
Attending: INTERNAL MEDICINE
Payer: MEDICARE

## 2022-01-10 ENCOUNTER — TELEPHONE (OUTPATIENT)
Dept: CARDIOLOGY | Facility: MEDICAL CENTER | Age: 72
End: 2022-01-10

## 2022-01-10 ENCOUNTER — PATIENT MESSAGE (OUTPATIENT)
Dept: CARDIOLOGY | Facility: MEDICAL CENTER | Age: 72
End: 2022-01-10

## 2022-01-10 DIAGNOSIS — I25.83 CORONARY ARTERY DISEASE DUE TO LIPID RICH PLAQUE: ICD-10-CM

## 2022-01-10 DIAGNOSIS — I47.10 SVT (SUPRAVENTRICULAR TACHYCARDIA) (HCC): ICD-10-CM

## 2022-01-10 DIAGNOSIS — I51.89 RIGHT ATRIAL MASS: ICD-10-CM

## 2022-01-10 DIAGNOSIS — I25.5 ISCHEMIC CARDIOMYOPATHY: ICD-10-CM

## 2022-01-10 DIAGNOSIS — I25.10 CORONARY ARTERY DISEASE DUE TO LIPID RICH PLAQUE: ICD-10-CM

## 2022-01-10 DIAGNOSIS — I48.0 PAF (PAROXYSMAL ATRIAL FIBRILLATION) (HCC): ICD-10-CM

## 2022-01-10 LAB
BASOPHILS # BLD AUTO: 1.1 % (ref 0–1.8)
BASOPHILS # BLD: 0.05 K/UL (ref 0–0.12)
CRP SERPL HS-MCNC: 2.8 MG/L (ref 0–3)
EOSINOPHIL # BLD AUTO: 0.09 K/UL (ref 0–0.51)
EOSINOPHIL NFR BLD: 2 % (ref 0–6.9)
ERYTHROCYTE [DISTWIDTH] IN BLOOD BY AUTOMATED COUNT: 58.4 FL (ref 35.9–50)
ERYTHROCYTE [SEDIMENTATION RATE] IN BLOOD BY WESTERGREN METHOD: 12 MM/HOUR (ref 0–20)
HCT VFR BLD AUTO: 49.3 % (ref 42–52)
HGB BLD-MCNC: 15.8 G/DL (ref 14–18)
IMM GRANULOCYTES # BLD AUTO: 0.04 K/UL (ref 0–0.11)
IMM GRANULOCYTES NFR BLD AUTO: 0.9 % (ref 0–0.9)
LYMPHOCYTES # BLD AUTO: 0.51 K/UL (ref 1–4.8)
LYMPHOCYTES NFR BLD: 11.1 % (ref 22–41)
MCH RBC QN AUTO: 32.6 PG (ref 27–33)
MCHC RBC AUTO-ENTMCNC: 32 G/DL (ref 33.7–35.3)
MCV RBC AUTO: 101.9 FL (ref 81.4–97.8)
MONOCYTES # BLD AUTO: 0.28 K/UL (ref 0–0.85)
MONOCYTES NFR BLD AUTO: 6.1 % (ref 0–13.4)
NEUTROPHILS # BLD AUTO: 3.62 K/UL (ref 1.82–7.42)
NEUTROPHILS NFR BLD: 78.8 % (ref 44–72)
NRBC # BLD AUTO: 0 K/UL
NRBC BLD-RTO: 0 /100 WBC
PLATELET # BLD AUTO: 271 K/UL (ref 164–446)
PMV BLD AUTO: 10.6 FL (ref 9–12.9)
RBC # BLD AUTO: 4.84 M/UL (ref 4.7–6.1)
WBC # BLD AUTO: 4.6 K/UL (ref 4.8–10.8)

## 2022-01-10 PROCEDURE — 36415 COLL VENOUS BLD VENIPUNCTURE: CPT

## 2022-01-10 PROCEDURE — 87040 BLOOD CULTURE FOR BACTERIA: CPT

## 2022-01-10 PROCEDURE — 86141 C-REACTIVE PROTEIN HS: CPT

## 2022-01-10 PROCEDURE — 85652 RBC SED RATE AUTOMATED: CPT

## 2022-01-10 PROCEDURE — 85025 COMPLETE CBC W/AUTO DIFF WBC: CPT

## 2022-01-10 RX ORDER — METOPROLOL SUCCINATE 25 MG/1
25 TABLET, EXTENDED RELEASE ORAL EVERY EVENING
Qty: 90 TABLET | Refills: 3 | Status: SHIPPED | OUTPATIENT
Start: 2022-01-10 | End: 2022-02-04

## 2022-01-10 NOTE — LETTER
January 17, 2022       Patient: Francesco Schulte   YOB: 1950   Date of Visit: 1/10/2022         To Whom It May Concern:    Francesco Schulte is currently under my cardiac care.     If you have any questions or concerns, please don't hesitate to call [phone]          Sincerely,          Hussain Morales MD  Electronically Signed

## 2022-01-10 NOTE — TELEPHONE ENCOUNTER
----- Message from Hussain Morales M.D. sent at 1/7/2022  4:59 PM PST -----    ----- Message -----  From: Hussain Morales M.D.  Sent: 1/7/2022   4:58 PM PST  To: Hussain Morales M.D.    Results of the echocardiogram are reviewed.  There is a persistent mass in the right atrium.  This may be a normal anatomic structure but in the context of recent bacteremia would like to exclude infection.  I recommend he complete 2 sets of blood cultures, a sedimentation rate and a CRP as well as a white blood cell count.

## 2022-01-10 NOTE — PATIENT COMMUNICATION
Called patient to discuss echo results and MD recommendations. He re-reviewed Dr. Morales's notes and understands getting the lab work done. He also wanted to know an update on his [employer] patient assistance form he dropped off during his visit with Dr. Isaac. He also states he's out of metoprolol and didn't know if he was supposed to continue taking it or not. Discussed taking it and continuing to monitor VS regularly with the restart of it, prescription sent to [facility] pharmacy.    He reports he continues to have poor circulation in his hands/fingers and they are cold. He denies N/T and has full sensation in his extremities.

## 2022-01-12 ENCOUNTER — TELEPHONE (OUTPATIENT)
Dept: CARDIOLOGY | Facility: MEDICAL CENTER | Age: 72
End: 2022-01-12

## 2022-01-12 NOTE — TELEPHONE ENCOUNTER
[facility] patient assistance form filled out and faxed to [phone]    Notified patient via [employer]

## 2022-01-17 NOTE — PATIENT COMMUNICATION
Hussain Morales M.D.  You 24 minutes ago (3:13 PM)       Stop metoprolol for 2 weeks and can resume.  If rash returns contact me again     Thanks  BE      You  Hussain Morales M.D. 7 hours ago (8:04 AM)       Patient believes his rash is from metoprolol, do you want to try something else? Currently on metoprolol SR 25mg QD      Letter sent to patient stating he is under cardiac care.

## 2022-01-24 NOTE — PROGRESS NOTES
Pt off the floor with transport to CT   audio/computer/internet/group instruction/individual instruction/pictorial/skill demonstration/verbal instruction/video/written material

## 2022-02-04 NOTE — PATIENT COMMUNICATION
"Called patient to discuss. He states he is having difficulty with adequate circulation and hypotension.     First thing in the morning while laying down, his BP is 89/68 with HR 81. If he changes positions, his BP goes even lower. He drinks 3-4 cups of \"highly caffinated coffee, like espresso\" and 2hrs later his /94 HR 91 and he's able to function.    He reports his fingers are white, fingernails are red/swollen and he has to place a heating pad on his hands on the highest setting to get the color back to his skin. As soon as he removes the heating pad, he loses the color.    He has not taken metoprolol for the last two weeks, due to reported rash, with no improvement to both his VS and his rash.     Discussed concerns with how much caffeine he's consuming and cardiac history. Discussed getting him scheduled sooner in the office, but he would like Dr. Morales's recommendations and if there is any changes to meds that can be make to help.   "

## 2022-02-05 NOTE — PATIENT COMMUNICATION
Hussain Morales M.D.  You 1 minute ago (4:26 PM)       Hard to interpret his concerns over computer. If hes concerned then I am too. Lets have him come in for a visit. He can see myself, or CARMEN.     Thanks   BE

## 2022-02-05 NOTE — PATIENT COMMUNICATION
"Called patient back and advised of MD's recommendation. The patient states \"it's under control, and I prefer to wait to see Dr. Morales 2/17.\" Discussed ER precautions for any worsening symptoms in the meantime, and patient verbalizes understanding.   "

## 2022-02-17 ENCOUNTER — OFFICE VISIT (OUTPATIENT)
Dept: CARDIOLOGY | Facility: MEDICAL CENTER | Age: 72
End: 2022-02-17
Payer: MEDICARE

## 2022-02-17 VITALS
HEIGHT: 75 IN | DIASTOLIC BLOOD PRESSURE: 82 MMHG | BODY MASS INDEX: 22.01 KG/M2 | HEART RATE: 86 BPM | SYSTOLIC BLOOD PRESSURE: 124 MMHG | OXYGEN SATURATION: 99 % | WEIGHT: 177 LBS | RESPIRATION RATE: 14 BRPM

## 2022-02-17 DIAGNOSIS — I51.89 RIGHT ATRIAL MASS: ICD-10-CM

## 2022-02-17 DIAGNOSIS — I48.0 PAROXYSMAL A-FIB (HCC): Chronic | ICD-10-CM

## 2022-02-17 DIAGNOSIS — I25.10 CORONARY ARTERY DISEASE INVOLVING NATIVE CORONARY ARTERY OF NATIVE HEART WITHOUT ANGINA PECTORIS: ICD-10-CM

## 2022-02-17 DIAGNOSIS — I25.5 ISCHEMIC CARDIOMYOPATHY: ICD-10-CM

## 2022-02-17 DIAGNOSIS — M94.0 COSTOCHONDRITIS: ICD-10-CM

## 2022-02-17 DIAGNOSIS — I50.32 CHRONIC HEART FAILURE WITH PRESERVED EJECTION FRACTION (HCC): ICD-10-CM

## 2022-02-17 DIAGNOSIS — Z78.9 STATIN INTOLERANCE: ICD-10-CM

## 2022-02-17 DIAGNOSIS — I95.1 ORTHOSTATIC HYPOTENSION: ICD-10-CM

## 2022-02-17 PROCEDURE — 99214 OFFICE O/P EST MOD 30 MIN: CPT | Performed by: INTERNAL MEDICINE

## 2022-02-17 ASSESSMENT — FIBROSIS 4 INDEX: FIB4 SCORE: 1.45

## 2022-02-17 NOTE — PROGRESS NOTES
CARDIOLOGY OUTPATIENT FOLLOWUP    PCP: Pcp Pt States None    1. Costochondritis    2. Chronic heart failure with preserved ejection fraction (HCC)    3. Right atrial mass    4. Orthostatic hypotension    5. Ischemic cardiomyopathy    6. Coronary artery disease involving native coronary artery of native heart without angina pectoris    7. Paroxysmal A-fib (HCC)    8. Statin intolerance      Francesco Schulte continues to have class II heart failure manifest primarily as low output symptoms.  He has substantial problems with orthostasis and thus is not suitable for beta-blockade or RAAS inhibition.  He will however continue digoxin and Eliquis.  I will obtain a cardiac MRI and further assessment of the right atrial mass and obtain a cardiopulmonary exercise test.  I did oblige his request for referral to [facility] advanced heart failure clinic though suspect the above testing will indicate this evaluation to be premature.  I additionally have concerns about his neuropsychiatric fitness for advanced heart failure therapy.    In the future I hope to reinstitute guideline directed medical therapy, would repeat an ischemic evaluation given the precipitous decline in ejection fraction.  I am reluctant to recommend ICD implantation with the psychiatric state.    Follow up: 3 months    Chief Complaint   Patient presents with   • Cardiomyopathy (Ischemic)   • Atrial Fibrillation     F/V Dx: Paroxysmal A-fib (HCC)       History: Francesco Schulte is a 71 y.o. male with history of ischemic cardiomyopathy, LAD infarct in 2016,, LVEF of 15%, declined from 45% previous, PAF diagnosed in 2016 with CVA presenting for follow-up.    In November he had undergone dual-chamber ICD extraction after having MSSA bacteremia.  He has completed therapy and has no signs of recurrence, though ejection fraction is continued to decline-which may be on account of not utilizing guideline directed medical therapy.. He has had a right  "atrial mass identified on subsequent imaging though no evidence of ongoing infection.    His main complaints are weakness and dizziness upon standing.  He essentially uses heavy doses of coffee consumption to counteract these feelings.  He also diligently supplement sodium within his diet.  He is no longer on beta-blockers.    ROS:   All other systems reviewed and negative except as per the HPI    PE:  /82 (BP Location: Right arm, Patient Position: Sitting, BP Cuff Size: Adult)   Pulse 86   Resp 14   Ht 1.905 m (6' 3\")   Wt 80.3 kg (177 lb)   SpO2 99%   BMI 22.12 kg/m²   Gen: no acute distress  HEENT: Symmetric face. Anicteric sclerae. Moist mucus membranes  NECK: No JVD. No lymphadenopathy  CARDIAC: Regular, Normal S1, S2, No murmur  VASCULATURE: carotids are normal bilaterally without bruit  RESP: Clear to auscultation bilaterally  ABD: Soft, non-tender, non-distended  EXT: No edema, no clubbing or cyanosis  SKIN: Warm and dry  NEURO: No gross deficits  PSYCH: Appropriate affect, participates in conversation    The ASCVD Risk score ([place] DC Jr, et al., 2013) failed to calculate.    Past Medical History:   Diagnosis Date   • Agent orange exposure    • Anesthesia     resistant to pain meds   • Cancer (HCC)     polycythemia vera   • Diabetes (HCC)     insulin and oral meds   • Family history of punctured lung 2002    left lung   • Heart attack (HCC) 03/2017   • Hemorrhagic disorder (HCC)     on blood thinners   • High cholesterol    • Ischemic cardiomyopathy    • Kidney stones     hx of kidney stones   • Orthostatic hypotension 3/29/2018   • Pain     headache pain   • Polycythemia vera(238.4)    • Stroke (HCC) 2016    r/t afib; eye issues resolved afterward   • Urinary bladder disorder     enlarged prostate, weak stream, difficulty urinating   • Vertigo      Allergies   Allergen Reactions   • Doxycycline      Swelling lips and difficulties swallowing   • Atorvastatin    • Beta Adrenergic Blockers " "Unspecified     dizziness   • Metformin Unspecified and Palpitations     Cardiac effects  \"Similar to a heart attack, I was hospitalized\"   • Simvastatin      Other reaction(s): Other (Specify with Comments)  Myalgias  Myalgias   • Statins [Hmg-Coa-R Inhibitors]      Muscle ache, joint pain     Outpatient Encounter Medications as of 2/17/2022   Medication Sig Dispense Refill   • tamsulosin (FLOMAX) 0.4 MG capsule Take 0.4 mg by mouth every day.     • finasteride (PROSCAR) 5 MG Tab TAKE 1 TABLET BY MOUTH EVERY DAY 90 Tablet 1   • apixaban (ELIQUIS) 5mg Tab Take 1 Tablet by mouth 2 times a day. 180 Tablet 3   • nitroglycerin (NITROSTAT) 0.4 MG SL Tab Place 1 Tablet under the tongue as needed for Chest Pain (or hypertension >180/110). 25 Tablet 3   • bumetanide (BUMEX) 0.5 MG Tab Take 1 tablet by mouth every day. 30 tablet 4   • docusate sodium (COLACE) 100 MG Cap Take 1 capsule by mouth 2 times a day as needed for Constipation. 90 capsule 1   • digoxin (LANOXIN) 125 MCG Tab Take 1 tablet by mouth every day at 6 PM. 90 tablet 3   • Blood Glucose Monitoring Suppl (ONETOUCH VERIO) w/Device Kit 1 Device 3 times a day before meals. 1 Kit 1   • Blood Glucose Test Strips verio test strips for glucose monitoring TID and  Each 5   • hydroxyurea (HYDREA) 500 MG Cap Take 500 mg by mouth 2 Times a Day.     • [DISCONTINUED] lidocaine (LIDODERM) 5 % Patch Place 2 Patches on the skin every 24 hours. (Patient not taking: Reported on 2/17/2022) 10 Patch 0   • [DISCONTINUED] meclizine (ANTIVERT) 25 MG Tab Take 1 Tablet by mouth 3 times a day as needed for Vertigo. (Patient not taking: No sig reported) 30 Tablet 0   • [DISCONTINUED] melatonin 5 mg Tab Take 1 Tablet by mouth every day. (Patient not taking: No sig reported) 30 Tablet 0   • [DISCONTINUED] methocarbamol (ROBAXIN) 500 MG Tab Take 1 Tablet by mouth every 8 hours. (Patient not taking: No sig reported) 120 Tablet 0   • [DISCONTINUED] sucralfate (CARAFATE) 1 GM Tab Take 1 " Tablet by mouth 4 Times a Day,Before Meals and at Bedtime. (Patient not taking: No sig reported) 120 Tablet 3   • [DISCONTINUED] Insulin Degludec (TRESIBA FLEXTOUCH) 200 UNIT/ML Solution Pen-injector Inject 12 Units under the skin every evening. (Patient taking differently: Inject 6-10 Units under the skin every morning.) 3 mL 11   • [DISCONTINUED] therapeutic multivitamin-minerals (THERAGRAN-M) Tab Take 1 Tab by mouth every day.       No facility-administered encounter medications on file as of 2/17/2022.     Social History     Socioeconomic History   • Marital status:      Spouse name: Not on file   • Number of children: Not on file   • Years of education: Not on file   • Highest education level: Not on file   Occupational History   • Not on file   Tobacco Use   • Smoking status: Never Smoker   • Smokeless tobacco: Never Used   Vaping Use   • Vaping Use: Never used   Substance and Sexual Activity   • Alcohol use: No   • Drug use: No   • Sexual activity: Not on file   Other Topics Concern   • Not on file   Social History Narrative   • Not on file     Social Determinants of Health     Financial Resource Strain: Not on file   Food Insecurity: Not on file   Transportation Needs: Not on file   Physical Activity: Not on file   Stress: Not on file   Social Connections: Not on file   Intimate Partner Violence: Not on file   Housing Stability: Not on file       Studies  Lab Results   Component Value Date/Time    CHOLSTRLTOT 145 01/20/2021 09:32 AM    LDL 90 01/20/2021 09:32 AM    HDL 33 (A) 01/20/2021 09:32 AM    TRIGLYCERIDE 110 01/20/2021 09:32 AM       Lab Results   Component Value Date/Time    SODIUM 136 11/08/2021 02:37 AM    POTASSIUM 3.7 11/08/2021 02:37 AM    CHLORIDE 101 11/08/2021 02:37 AM    CO2 24 11/08/2021 02:37 AM    GLUCOSE 182 (H) 11/08/2021 02:37 AM    BUN 9 11/08/2021 02:37 AM    CREATININE 0.69 11/08/2021 02:37 AM     Lab Results   Component Value Date/Time    ALKPHOSPHAT 106 (H) 11/07/2021 03:00  AM    ASTSGOT 18 11/07/2021 03:00 AM    ALTSGPT 10 11/07/2021 03:00 AM    TBILIRUBIN 0.4 11/07/2021 03:00 AM        For this encounter I reviewed the following  (Electrophysiology) notes, BMP and Lipid profile

## 2022-02-22 ENCOUNTER — TELEPHONE (OUTPATIENT)
Dept: CARDIOLOGY | Facility: MEDICAL CENTER | Age: 72
End: 2022-02-22
Payer: MEDICARE

## 2022-02-22 NOTE — TELEPHONE ENCOUNTER
----- Message from Tato Chavez Ass't sent at 2/22/2022  9:00 AM PST -----  Regarding: FW: Heart MRI    ----- Message -----  From: Francesco Schulte  Sent: 2/21/2022   8:41 PM PST  To: Lavonne Augustine/Magdy  Subject: Heart MRI                                        Nearest appt is March 23 can you bring it closer please

## 2022-02-23 ENCOUNTER — TELEPHONE (OUTPATIENT)
Dept: CARDIOLOGY | Facility: MEDICAL CENTER | Age: 72
End: 2022-02-23
Payer: MEDICARE

## 2022-02-23 DIAGNOSIS — I50.32 CHRONIC HEART FAILURE WITH PRESERVED EJECTION FRACTION (HCC): ICD-10-CM

## 2022-02-23 NOTE — TELEPHONE ENCOUNTER
Hussain Morales M.D.  Lois Toro R.N.  Can you find a location where the patient can have a cardiopulmonary exercise test? Perhaps heart failure team knows?   Thx   BE

## 2022-02-28 ENCOUNTER — TELEPHONE (OUTPATIENT)
Dept: SCHEDULING | Facility: IMAGING CENTER | Age: 72
End: 2022-02-28
Payer: MEDICARE

## 2022-02-28 DIAGNOSIS — N40.0 BENIGN PROSTATIC HYPERPLASIA WITHOUT LOWER URINARY TRACT SYMPTOMS: ICD-10-CM

## 2022-02-28 RX ORDER — TAMSULOSIN HYDROCHLORIDE 0.4 MG/1
0.4 CAPSULE ORAL DAILY
Qty: 30 CAPSULE | Refills: 0 | Status: SHIPPED | OUTPATIENT
Start: 2022-02-28 | End: 2022-03-10 | Stop reason: SDUPTHER

## 2022-02-28 NOTE — TELEPHONE ENCOUNTER
Received request via: Patient    Was the patient seen in the last year in this department? Yes    Does the patient have an active prescription (recently filled or refills available) for medication(s) requested? No     Patient called requesting a refill as he us out and has an appt next week to establish with Dr. ALESSANDRA Miller regardsSocorro  Senior Medical assistant

## 2022-03-08 NOTE — TELEPHONE ENCOUNTER
CAITLIN Barlow, I am sorry but everyone came back to me stating that the referral is put in as a consultation but needs to be put in as an order for testing.     Thank you.

## 2022-03-10 ENCOUNTER — OFFICE VISIT (OUTPATIENT)
Dept: MEDICAL GROUP | Facility: MEDICAL CENTER | Age: 72
End: 2022-03-10
Payer: MEDICARE

## 2022-03-10 VITALS
TEMPERATURE: 97.4 F | HEART RATE: 81 BPM | OXYGEN SATURATION: 97 % | SYSTOLIC BLOOD PRESSURE: 124 MMHG | BODY MASS INDEX: 23.54 KG/M2 | WEIGHT: 183.42 LBS | RESPIRATION RATE: 18 BRPM | DIASTOLIC BLOOD PRESSURE: 72 MMHG | HEIGHT: 74 IN

## 2022-03-10 DIAGNOSIS — E78.5 DYSLIPIDEMIA: ICD-10-CM

## 2022-03-10 DIAGNOSIS — D45 POLYCYTHEMIA VERA (HCC): ICD-10-CM

## 2022-03-10 DIAGNOSIS — I50.22 CHRONIC SYSTOLIC HEART FAILURE (HCC): Chronic | ICD-10-CM

## 2022-03-10 DIAGNOSIS — Z79.4 TYPE 2 DIABETES MELLITUS WITHOUT COMPLICATION, WITH LONG-TERM CURRENT USE OF INSULIN (HCC): ICD-10-CM

## 2022-03-10 DIAGNOSIS — N40.0 BENIGN PROSTATIC HYPERPLASIA WITHOUT LOWER URINARY TRACT SYMPTOMS: ICD-10-CM

## 2022-03-10 DIAGNOSIS — E11.9 TYPE 2 DIABETES MELLITUS WITHOUT COMPLICATION, WITH LONG-TERM CURRENT USE OF INSULIN (HCC): ICD-10-CM

## 2022-03-10 PROBLEM — M32.9 LUPUS (HCC): Status: RESOLVED | Noted: 2019-11-15 | Resolved: 2022-03-10

## 2022-03-10 PROBLEM — I33.0 ACUTE BACTERIAL ENDOCARDITIS: Status: RESOLVED | Noted: 2021-11-02 | Resolved: 2022-03-10

## 2022-03-10 PROCEDURE — 99214 OFFICE O/P EST MOD 30 MIN: CPT | Performed by: INTERNAL MEDICINE

## 2022-03-10 RX ORDER — FINASTERIDE 5 MG/1
5 TABLET, FILM COATED ORAL
Qty: 90 TABLET | Refills: 1 | Status: SHIPPED | OUTPATIENT
Start: 2022-04-04 | End: 2023-01-01

## 2022-03-10 RX ORDER — DICYCLOMINE HYDROCHLORIDE 10 MG/1
CAPSULE ORAL
COMMUNITY
Start: 2022-03-02 | End: 2022-03-10

## 2022-03-10 RX ORDER — SUCRALFATE 1 G/1
TABLET ORAL
COMMUNITY
Start: 2022-02-20 | End: 2022-03-10

## 2022-03-10 RX ORDER — TAMSULOSIN HYDROCHLORIDE 0.4 MG/1
0.4 CAPSULE ORAL DAILY
Qty: 90 CAPSULE | Refills: 1 | Status: SHIPPED | OUTPATIENT
Start: 2022-03-10 | End: 2023-01-01 | Stop reason: SDUPTHER

## 2022-03-10 ASSESSMENT — ENCOUNTER SYMPTOMS
NAUSEA: 0
SORE THROAT: 0
DEPRESSION: 0
FEVER: 0
COUGH: 0
CHILLS: 0
FOCAL WEAKNESS: 0
VOMITING: 0
SPEECH CHANGE: 0
SHORTNESS OF BREATH: 0

## 2022-03-10 ASSESSMENT — FIBROSIS 4 INDEX: FIB4 SCORE: 1.45

## 2022-03-10 ASSESSMENT — PATIENT HEALTH QUESTIONNAIRE - PHQ9: CLINICAL INTERPRETATION OF PHQ2 SCORE: 0

## 2022-03-10 NOTE — PROGRESS NOTES
Subjective:     Chief Complaint   Patient presents with   • Medication Refill      Diagnoses of Chronic systolic heart failure (HCC), Type 2 diabetes mellitus without complication, with long-term current use of insulin (HCC), Dyslipidemia, Polycythemia vera (HCC), and Benign prostatic hyperplasia without lower urinary tract symptoms were pertinent to this visit.    HISTORY OF THE PRESENT ILLNESS: Patient is a 71 y.o. male. This pleasant patient is here today to establish care and medication refill. His prior PCP was Nereyda Moore.    Problem   Chronic Systolic Heart Failure (Hcc)    Patient is followed by cardiology Dr. Morales. ejection fraction 15 to 20% on echocardiogram from January 2022.  Patient is not on ACE inhibitor's, orbs, or beta-blocker due to severe orthostatic symptoms.  He is taking digoxin 125 mcg daily and anticoagulated with Eliquis 5 mg tablet daily.     Type 2 Diabetes Mellitus Without Complication, With Long-Term Current Use of Insulin (Hcc)    This is a chronic condition, managed by endocrinology Dr. Mckeon.  His current regimen includes Tresiba 8 units daily, he has been down to 6 units daily lately base don his fasting BG.   Last A1c:   Lab Results   Component Value Date/Time    HBA1C 7.4 (A) 07/14/2021 0853    AVGLUC 157 04/02/2021 1524     Last Microalb/Cr ratio:   Lab Results   Component Value Date/Time    URCREAT 65.71 01/20/2021 0931    MICROALBUR 5.4 01/20/2021 0931    MALBCRT 82 (H) 01/20/2021 0931   Fasting sugars: 130s, 215-220 postprandial  Last diabetic foot exam: 8 mo ago  Last retinal eye exam: due, referred  ACEi/ARB: contraindicated due to orthostatic hypotension  Statin: intolerant   Aspirin: no, on Eliquis  Concomitant HTN: no             Past Medical History:   Diagnosis Date   • 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   • Agent orange exposure    • Anesthesia     resistant to pain meds   • Cancer (HCC)     polycythemia vera   • Diabetes (HCC)     insulin and oral meds   •  Family history of punctured lung 2002    left lung   • Heart attack (HCC) 03/2017   • Hemorrhagic disorder (HCC)     on blood thinners   • High cholesterol    • Ischemic cardiomyopathy    • Kidney stones     hx of kidney stones   • Lupus (HCC) 11/15/2019   • Orthostatic hypotension 3/29/2018   • Pain     headache pain   • Polycythemia vera(238.4)    • Stroke (HCC) 2016    r/t afib; eye issues resolved afterward   • Urinary bladder disorder     enlarged prostate, weak stream, difficulty urinating   • Vertigo      Past Surgical History:   Procedure Laterality Date   • PAMELA N/A 10/30/2021    Procedure: ECHOCARDIOGRAM, TRANSESOPHAGEAL;  Surgeon: Beau Gutierrez M.D.;  Location: [facility];  Service: Cardiac   • AICD IMPLANT  07/29/2021    [employer] [id number] implanted by Dr. Isaac.   • SPLIT THICKNESS SKIN GRAFT N/A 8/23/2020    Procedure: APPLICATION, GRAFT, SKIN, SPLIT-THICKNESS;  Surgeon: Elisa Craig M.D.;  Location: [facility];  Service: Plastics   • SKIN ABSCESS INCISION AND DRAINAGE Right 8/3/2020    Procedure: INCISION AND DRAINAGE - SCROTAL WOUND - WOUND VAC PLACEMENT;  Surgeon: Jaylen Hayes M.D.;  Location: [facility];  Service: Urology   • FL EXPLORE SCROTUM Right 7/31/2020    Procedure: EXPLORATION, SCROTUM - FOR WASH OUT OF SCROTAL WOUND & WOUND VAC PLACEMENT;  Surgeon: Ferny Rosales M.D.;  Location: [facility];  Service: Urology   • FL EXPLORE SCROTUM Right 7/29/2020    Procedure: EXPLORATION, SCROTUM - FOR I & D OF SCROTAL AND  GROIN WOUND;  Surgeon: Donta Viera M.D.;  Location: [facility];  Service: Urology   • FL INCIS/DRAIN SCROTUM/TESTIS,EPIDIDYM Right 7/25/2020    Procedure: INCISION AND DRAINAGE, SCROTUM;  Surgeon: Nick Negro M.D.;  Location: [facility];  Service: Urology   • TEMPORAL ARTERY BIOPSY Right 6/26/2018    Procedure: TEMPORAL ARTERY BIOPSY;  Surgeon: Rajendra Aguilar M.D.;   Location: SURGERY SAME DAY [facility];  Service: General   • CAROTID ENDARTERECTOMY Right 3/21/2018    Procedure: CAROTID ENDARTERECTOMY- W/EEG MONITORING;  Surgeon: Benny Morfin M.D.;  Location: SURGERY [facility];  Service: General   • APPENDECTOMY     • CATH PLACEMENT      right arm   • LAMINOTOMY      diskectomy; C4   • OTHER CARDIAC SURGERY      stents x 3     Family History   Problem Relation Age of Onset   • Ovarian Cancer Neg Hx    • Diabetes Neg Hx      Social History     Tobacco Use   • Smoking status: Never Smoker   • Smokeless tobacco: Never Used   Vaping Use   • Vaping Use: Never used   Substance Use Topics   • Alcohol use: No   • Drug use: No     Current Outpatient Medications Ordered in Epic   Medication Sig Dispense Refill   • tamsulosin (FLOMAX) 0.4 MG capsule Take 1 Capsule by mouth every day. 90 Capsule 1   • glucose blood strip 1 Strip by Other route as needed. 100 Strip 1   • [START ON 4/4/2022] finasteride (PROSCAR) 5 MG Tab Take 1 Tablet by mouth every day. 90 Tablet 1   • apixaban (ELIQUIS) 5mg Tab Take 1 Tablet by mouth 2 times a day. 180 Tablet 3   • nitroglycerin (NITROSTAT) 0.4 MG SL Tab Place 1 Tablet under the tongue as needed for Chest Pain (or hypertension >180/110). 25 Tablet 3   • docusate sodium (COLACE) 100 MG Cap Take 1 capsule by mouth 2 times a day as needed for Constipation. 90 capsule 1   • digoxin (LANOXIN) 125 MCG Tab Take 1 tablet by mouth every day at 6 PM. 90 tablet 3   • Blood Glucose Monitoring Suppl (ONETOUCH VERIO) w/Device Kit 1 Device 3 times a day before meals. 1 Kit 1   • Blood Glucose Test Strips verio test strips for glucose monitoring TID and  Each 5   • hydroxyurea (HYDREA) 500 MG Cap Take 500 mg by mouth 2 Times a Day.       No current [facility]-ordered facility-administered medications on file.       Review of Systems   Constitutional: Negative for chills and fever.   HENT: Negative for sore throat.    Respiratory: Negative for cough and  "shortness of breath.    Cardiovascular: Negative for chest pain.   Gastrointestinal: Negative for nausea and vomiting.   Genitourinary: Negative for hematuria.   Neurological: Negative for speech change and focal weakness.   Psychiatric/Behavioral: Negative for depression.     Objective:     Exam: /72 (BP Location: Right arm, Patient Position: Sitting, BP Cuff Size: Adult)   Pulse 81   Temp 36.3 °C (97.4 °F) (Temporal)   Resp 18   Ht 1.867 m (6' 1.5\")   Wt 83.2 kg (183 lb 6.8 oz)   SpO2 97%  Body mass index is 23.87 kg/m².    Physical Exam  Vitals reviewed.   Constitutional:       General: He is not in acute distress.     Appearance: Normal appearance. He is normal weight. He is not ill-appearing or toxic-appearing.   HENT:      Head: Normocephalic and atraumatic.      Nose: Nose normal. No congestion or rhinorrhea.      Mouth/Throat:      Mouth: Mucous membranes are moist.      Pharynx: Oropharynx is clear. No oropharyngeal exudate.   Eyes:      General: No scleral icterus.     Pupils: Pupils are equal, round, and reactive to light.   Cardiovascular:      Rate and Rhythm: Normal rate and regular rhythm.      Pulses: Normal pulses.      Heart sounds: Normal heart sounds.      Comments: Distant heart sounds  Pulmonary:      Effort: Pulmonary effort is normal. No respiratory distress.      Breath sounds: Normal breath sounds. No wheezing or rales.   Skin:     General: Skin is warm and dry.   Neurological:      General: No focal deficit present.      Mental Status: He is alert and oriented to person, place, and time.   Psychiatric:         Attention and Perception: He is attentive.         Mood and Affect: Mood and affect normal.         Thought Content: Thought content normal.       Labs: Reviewed results of hemoglobin A1c from July 2021, TSH from May 2021.  Echo: 01/2022    Assessment & Plan:   71 y.o. male with the following -    Problem List Items Addressed This Visit     Benign prostatic hyperplasia " without lower urinary tract symptoms (Chronic)    Relevant Medications    tamsulosin (FLOMAX) 0.4 MG capsule    finasteride (PROSCAR) 5 MG Tab (Start on 4/4/2022)    Chronic systolic heart failure (HCC) (Chronic)     This is a chronic problem, patient fortunately had his ICD removed due to sepsis.  Currently taking digoxin 125 mcg daily and anticoagulated with Eliquis 5 mg tablet twice daily.         Dyslipidemia    Relevant Orders    Lipid Profile    Polycythemia vera (HCC)    Relevant Orders    CBC WITH DIFFERENTIAL    Type 2 diabetes mellitus without complication, with long-term current use of insulin (HCC)     This is a chronic condition, appears to be well controlled.  Patient reports adequate fasting blood glucose numbers.  He is due for retinal eye exam, hemoglobin A1c and urine creatinine albumin ratio.  Emphasized low-carb diet.  Continue current insulin regimen without changes.         Relevant Medications    glucose blood strip    Other Relevant Orders    HEMOGLOBIN A1C    Comp Metabolic Panel    Referral for Retinal Screening Exam    Microalbumin Creat Ratio Urine (Lab Collect)          Return for 3 mo follow up.    Please note that this dictation was created using voice recognition software. I have made every reasonable attempt to correct obvious errors, but I expect that there are errors of grammar and possibly content that I did not discover before finalizing the note.

## 2022-03-10 NOTE — ASSESSMENT & PLAN NOTE
This is a chronic problem, patient fortunately had his ICD removed due to sepsis.  Currently taking digoxin 125 mcg daily and anticoagulated with Eliquis 5 mg tablet twice daily.

## 2022-03-10 NOTE — ASSESSMENT & PLAN NOTE
This is a chronic condition, appears to be well controlled.  Patient reports adequate fasting blood glucose numbers.  He is due for retinal eye exam, hemoglobin A1c and urine creatinine albumin ratio.  Emphasized low-carb diet.  Continue current insulin regimen without changes.

## 2022-03-15 ENCOUNTER — APPOINTMENT (OUTPATIENT)
Dept: PULMONOLOGY | Facility: MEDICAL CENTER | Age: 72
End: 2022-03-15
Payer: MEDICARE

## 2022-03-23 ENCOUNTER — HOSPITAL ENCOUNTER (OUTPATIENT)
Dept: RADIOLOGY | Facility: MEDICAL CENTER | Age: 72
End: 2022-03-23
Attending: INTERNAL MEDICINE
Payer: MEDICARE

## 2022-03-23 DIAGNOSIS — M94.0 COSTOCHONDRITIS: ICD-10-CM

## 2022-03-23 PROCEDURE — 75561 CARDIAC MRI FOR MORPH W/DYE: CPT | Mod: ME

## 2022-03-23 PROCEDURE — 700117 HCHG RX CONTRAST REV CODE 255: Performed by: INTERNAL MEDICINE

## 2022-03-23 PROCEDURE — A9577 INJ MULTIHANCE: HCPCS | Performed by: INTERNAL MEDICINE

## 2022-03-23 RX ADMIN — GADOBENATE DIMEGLUMINE 17 ML: 529 INJECTION, SOLUTION INTRAVENOUS at 16:48

## 2022-03-31 ENCOUNTER — HOSPITAL ENCOUNTER (OUTPATIENT)
Dept: LAB | Facility: MEDICAL CENTER | Age: 72
End: 2022-03-31
Payer: MEDICARE

## 2022-03-31 ENCOUNTER — HOSPITAL ENCOUNTER (OUTPATIENT)
Dept: LAB | Facility: MEDICAL CENTER | Age: 72
End: 2022-03-31
Attending: INTERNAL MEDICINE
Payer: MEDICARE

## 2022-03-31 DIAGNOSIS — Z79.4 TYPE 2 DIABETES MELLITUS WITHOUT COMPLICATION, WITH LONG-TERM CURRENT USE OF INSULIN (HCC): ICD-10-CM

## 2022-03-31 DIAGNOSIS — D45 POLYCYTHEMIA VERA (HCC): ICD-10-CM

## 2022-03-31 DIAGNOSIS — E78.5 DYSLIPIDEMIA: ICD-10-CM

## 2022-03-31 DIAGNOSIS — E11.9 TYPE 2 DIABETES MELLITUS WITHOUT COMPLICATION, WITH LONG-TERM CURRENT USE OF INSULIN (HCC): ICD-10-CM

## 2022-03-31 LAB
ALBUMIN SERPL BCP-MCNC: 4.1 G/DL (ref 3.2–4.9)
ALBUMIN/GLOB SERPL: 0.9 G/DL
ALP SERPL-CCNC: 62 U/L (ref 30–99)
ALT SERPL-CCNC: 13 U/L (ref 2–50)
ANION GAP SERPL CALC-SCNC: 11 MMOL/L (ref 7–16)
ANION GAP SERPL CALC-SCNC: 11 MMOL/L (ref 7–16)
AST SERPL-CCNC: 20 U/L (ref 12–45)
BASOPHILS # BLD AUTO: 0.9 % (ref 0–1.8)
BASOPHILS # BLD: 0.05 K/UL (ref 0–0.12)
BILIRUB SERPL-MCNC: 0.9 MG/DL (ref 0.1–1.5)
BUN SERPL-MCNC: 12 MG/DL (ref 8–22)
BUN SERPL-MCNC: 12 MG/DL (ref 8–22)
CALCIUM SERPL-MCNC: 10.1 MG/DL (ref 8.4–10.2)
CALCIUM SERPL-MCNC: 10.3 MG/DL (ref 8.4–10.2)
CHLORIDE SERPL-SCNC: 101 MMOL/L (ref 96–112)
CHLORIDE SERPL-SCNC: 99 MMOL/L (ref 96–112)
CHOLEST SERPL-MCNC: 182 MG/DL (ref 100–199)
CO2 SERPL-SCNC: 25 MMOL/L (ref 20–33)
CO2 SERPL-SCNC: 25 MMOL/L (ref 20–33)
CREAT SERPL-MCNC: 0.88 MG/DL (ref 0.5–1.4)
CREAT SERPL-MCNC: 0.9 MG/DL (ref 0.5–1.4)
CREAT UR-MCNC: 34.3 MG/DL
EOSINOPHIL # BLD AUTO: 0.09 K/UL (ref 0–0.51)
EOSINOPHIL NFR BLD: 1.6 % (ref 0–6.9)
ERYTHROCYTE [DISTWIDTH] IN BLOOD BY AUTOMATED COUNT: 67.5 FL (ref 35.9–50)
EST. AVERAGE GLUCOSE BLD GHB EST-MCNC: 160 MG/DL
FASTING STATUS PATIENT QL REPORTED: NORMAL
GFR SERPLBLD CREATININE-BSD FMLA CKD-EPI: 92 ML/MIN/1.73 M 2
GFR SERPLBLD CREATININE-BSD FMLA CKD-EPI: 93 ML/MIN/1.73 M 2
GLOBULIN SER CALC-MCNC: 4.5 G/DL (ref 1.9–3.5)
GLUCOSE SERPL-MCNC: 107 MG/DL (ref 65–99)
GLUCOSE SERPL-MCNC: 107 MG/DL (ref 65–99)
HBA1C MFR BLD: 7.2 % (ref 4–5.6)
HCT VFR BLD AUTO: 51.7 % (ref 42–52)
HDLC SERPL-MCNC: 34 MG/DL
HGB BLD-MCNC: 17.1 G/DL (ref 14–18)
IMM GRANULOCYTES # BLD AUTO: 0.05 K/UL (ref 0–0.11)
IMM GRANULOCYTES NFR BLD AUTO: 0.9 % (ref 0–0.9)
LDLC SERPL CALC-MCNC: 122 MG/DL
LYMPHOCYTES # BLD AUTO: 0.44 K/UL (ref 1–4.8)
LYMPHOCYTES NFR BLD: 7.9 % (ref 22–41)
MCH RBC QN AUTO: 33.4 PG (ref 27–33)
MCHC RBC AUTO-ENTMCNC: 33.1 G/DL (ref 33.7–35.3)
MCV RBC AUTO: 101 FL (ref 81.4–97.8)
MICROALBUMIN UR-MCNC: 4.8 MG/DL
MICROALBUMIN/CREAT UR: 140 MG/G (ref 0–30)
MONOCYTES # BLD AUTO: 0.34 K/UL (ref 0–0.85)
MONOCYTES NFR BLD AUTO: 6.1 % (ref 0–13.4)
NEUTROPHILS # BLD AUTO: 4.62 K/UL (ref 1.82–7.42)
NEUTROPHILS NFR BLD: 82.6 % (ref 44–72)
NRBC # BLD AUTO: 0 K/UL
NRBC BLD-RTO: 0 /100 WBC
NT-PROBNP SERPL IA-MCNC: 1441 PG/ML (ref 0–125)
PLATELET # BLD AUTO: 216 K/UL (ref 164–446)
PMV BLD AUTO: 11.5 FL (ref 9–12.9)
POTASSIUM SERPL-SCNC: 4.1 MMOL/L (ref 3.6–5.5)
POTASSIUM SERPL-SCNC: 4.2 MMOL/L (ref 3.6–5.5)
PROT SERPL-MCNC: 8.6 G/DL (ref 6–8.2)
RBC # BLD AUTO: 5.12 M/UL (ref 4.7–6.1)
SODIUM SERPL-SCNC: 135 MMOL/L (ref 135–145)
SODIUM SERPL-SCNC: 137 MMOL/L (ref 135–145)
TRIGL SERPL-MCNC: 131 MG/DL (ref 0–149)
WBC # BLD AUTO: 5.6 K/UL (ref 4.8–10.8)

## 2022-03-31 PROCEDURE — 82570 ASSAY OF URINE CREATININE: CPT

## 2022-03-31 PROCEDURE — 80053 COMPREHEN METABOLIC PANEL: CPT

## 2022-03-31 PROCEDURE — 80048 BASIC METABOLIC PNL TOTAL CA: CPT

## 2022-03-31 PROCEDURE — 83880 ASSAY OF NATRIURETIC PEPTIDE: CPT

## 2022-03-31 PROCEDURE — 36415 COLL VENOUS BLD VENIPUNCTURE: CPT

## 2022-03-31 PROCEDURE — 83036 HEMOGLOBIN GLYCOSYLATED A1C: CPT

## 2022-03-31 PROCEDURE — 82043 UR ALBUMIN QUANTITATIVE: CPT

## 2022-03-31 PROCEDURE — 85025 COMPLETE CBC W/AUTO DIFF WBC: CPT

## 2022-03-31 PROCEDURE — 80061 LIPID PANEL: CPT

## 2022-04-07 ENCOUNTER — PATIENT MESSAGE (OUTPATIENT)
Dept: MEDICAL GROUP | Facility: MEDICAL CENTER | Age: 72
End: 2022-04-07
Payer: MEDICARE

## 2022-04-14 ENCOUNTER — HOSPITAL ENCOUNTER (OUTPATIENT)
Dept: LAB | Facility: MEDICAL CENTER | Age: 72
End: 2022-04-14
Payer: MEDICARE

## 2022-04-14 LAB
ANION GAP SERPL CALC-SCNC: 10 MMOL/L (ref 7–16)
BUN SERPL-MCNC: 14 MG/DL (ref 8–22)
CALCIUM SERPL-MCNC: 10 MG/DL (ref 8.4–10.2)
CHLORIDE SERPL-SCNC: 102 MMOL/L (ref 96–112)
CO2 SERPL-SCNC: 26 MMOL/L (ref 20–33)
CREAT SERPL-MCNC: 1.08 MG/DL (ref 0.5–1.4)
GFR SERPLBLD CREATININE-BSD FMLA CKD-EPI: 74 ML/MIN/1.73 M 2
GLUCOSE SERPL-MCNC: 139 MG/DL (ref 65–99)
POTASSIUM SERPL-SCNC: 3.8 MMOL/L (ref 3.6–5.5)
SODIUM SERPL-SCNC: 138 MMOL/L (ref 135–145)

## 2022-04-14 PROCEDURE — 80048 BASIC METABOLIC PNL TOTAL CA: CPT

## 2022-04-14 PROCEDURE — 36415 COLL VENOUS BLD VENIPUNCTURE: CPT

## 2022-05-12 ENCOUNTER — HOSPITAL ENCOUNTER (OUTPATIENT)
Dept: LAB | Facility: MEDICAL CENTER | Age: 72
End: 2022-05-12
Attending: INTERNAL MEDICINE
Payer: MEDICARE

## 2022-05-12 LAB
ANION GAP SERPL CALC-SCNC: 12 MMOL/L (ref 7–16)
BUN SERPL-MCNC: 21 MG/DL (ref 8–22)
CALCIUM SERPL-MCNC: 9.5 MG/DL (ref 8.4–10.2)
CHLORIDE SERPL-SCNC: 103 MMOL/L (ref 96–112)
CO2 SERPL-SCNC: 20 MMOL/L (ref 20–33)
CREAT SERPL-MCNC: 1.08 MG/DL (ref 0.5–1.4)
FASTING STATUS PATIENT QL REPORTED: NORMAL
GFR SERPLBLD CREATININE-BSD FMLA CKD-EPI: 74 ML/MIN/1.73 M 2
GLUCOSE SERPL-MCNC: 203 MG/DL (ref 65–99)
POTASSIUM SERPL-SCNC: 3.8 MMOL/L (ref 3.6–5.5)
SODIUM SERPL-SCNC: 135 MMOL/L (ref 135–145)

## 2022-05-12 PROCEDURE — 36415 COLL VENOUS BLD VENIPUNCTURE: CPT

## 2022-05-12 PROCEDURE — 80048 BASIC METABOLIC PNL TOTAL CA: CPT

## 2022-05-31 ENCOUNTER — HOSPITAL ENCOUNTER (INPATIENT)
Facility: MEDICAL CENTER | Age: 72
LOS: 3 days | DRG: 689 | End: 2022-06-03
Attending: EMERGENCY MEDICINE | Admitting: INTERNAL MEDICINE
Payer: MEDICARE

## 2022-05-31 ENCOUNTER — APPOINTMENT (OUTPATIENT)
Dept: RADIOLOGY | Facility: MEDICAL CENTER | Age: 72
DRG: 689 | End: 2022-05-31
Attending: EMERGENCY MEDICINE
Payer: MEDICARE

## 2022-05-31 DIAGNOSIS — J01.10 ACUTE NON-RECURRENT FRONTAL SINUSITIS: ICD-10-CM

## 2022-05-31 DIAGNOSIS — R41.82 ALTERED MENTAL STATUS, UNSPECIFIED ALTERED MENTAL STATUS TYPE: ICD-10-CM

## 2022-05-31 DIAGNOSIS — R73.9 HYPERGLYCEMIA: ICD-10-CM

## 2022-05-31 PROBLEM — I25.10 CAD (CORONARY ARTERY DISEASE): Status: RESOLVED | Noted: 2017-03-13 | Resolved: 2022-05-31

## 2022-05-31 PROBLEM — N40.1 BENIGN PROSTATIC HYPERPLASIA WITH URINARY RETENTION: Status: ACTIVE | Noted: 2017-03-12

## 2022-05-31 PROBLEM — R33.8 BENIGN PROSTATIC HYPERPLASIA WITH URINARY RETENTION: Status: ACTIVE | Noted: 2017-03-12

## 2022-05-31 PROBLEM — N30.00 ACUTE CYSTITIS: Status: ACTIVE | Noted: 2022-05-31

## 2022-05-31 PROBLEM — E87.1 HYPONATREMIA: Status: ACTIVE | Noted: 2022-05-31

## 2022-05-31 PROBLEM — G92.8 TOXIC METABOLIC ENCEPHALOPATHY: Status: ACTIVE | Noted: 2022-05-31

## 2022-05-31 LAB
AMMONIA PLAS-SCNC: <10 UMOL/L (ref 11–45)
AMPHET UR QL SCN: NEGATIVE
ANION GAP SERPL CALC-SCNC: 12 MMOL/L (ref 7–16)
ANISOCYTOSIS BLD QL SMEAR: ABNORMAL
APPEARANCE UR: CLEAR
BACTERIA #/AREA URNS HPF: ABNORMAL /HPF
BARBITURATES UR QL SCN: NEGATIVE
BASOPHILS # BLD AUTO: 1.1 % (ref 0–1.8)
BASOPHILS # BLD: 0.06 K/UL (ref 0–0.12)
BENZODIAZ UR QL SCN: NEGATIVE
BILIRUB UR QL STRIP.AUTO: NEGATIVE
BUN SERPL-MCNC: 19 MG/DL (ref 8–22)
BZE UR QL SCN: NEGATIVE
CALCIUM SERPL-MCNC: 9.7 MG/DL (ref 8.4–10.2)
CANNABINOIDS UR QL SCN: NEGATIVE
CHLORIDE SERPL-SCNC: 94 MMOL/L (ref 96–112)
CO2 SERPL-SCNC: 23 MMOL/L (ref 20–33)
COLOR UR: YELLOW
COMMENT 1642: NORMAL
CREAT SERPL-MCNC: 1.09 MG/DL (ref 0.5–1.4)
DIGOXIN SERPL-MCNC: <0.3 NG/ML (ref 0.8–2)
EKG IMPRESSION: NORMAL
EOSINOPHIL # BLD AUTO: 0.09 K/UL (ref 0–0.51)
EOSINOPHIL NFR BLD: 1.6 % (ref 0–6.9)
EPI CELLS #/AREA URNS HPF: ABNORMAL /HPF
ERYTHROCYTE [DISTWIDTH] IN BLOOD BY AUTOMATED COUNT: 68 FL (ref 35.9–50)
ETHANOL BLD-MCNC: <10.1 MG/DL
GFR SERPLBLD CREATININE-BSD FMLA CKD-EPI: 73 ML/MIN/1.73 M 2
GLUCOSE SERPL-MCNC: 266 MG/DL (ref 65–99)
GLUCOSE UR STRIP.AUTO-MCNC: 100 MG/DL
HCT VFR BLD AUTO: 50.9 % (ref 42–52)
HGB BLD-MCNC: 17.1 G/DL (ref 14–18)
IMM GRANULOCYTES # BLD AUTO: 0.04 K/UL (ref 0–0.11)
IMM GRANULOCYTES NFR BLD AUTO: 0.7 % (ref 0–0.9)
KETONES UR STRIP.AUTO-MCNC: NEGATIVE MG/DL
LEUKOCYTE ESTERASE UR QL STRIP.AUTO: ABNORMAL
LYMPHOCYTES # BLD AUTO: 0.5 K/UL (ref 1–4.8)
LYMPHOCYTES NFR BLD: 9.1 % (ref 22–41)
MACROCYTES BLD QL SMEAR: ABNORMAL
MCH RBC QN AUTO: 35.7 PG (ref 27–33)
MCHC RBC AUTO-ENTMCNC: 33.6 G/DL (ref 33.7–35.3)
MCV RBC AUTO: 106.3 FL (ref 81.4–97.8)
METHADONE UR QL SCN: NEGATIVE
MICRO URNS: ABNORMAL
MONOCYTES # BLD AUTO: 0.35 K/UL (ref 0–0.85)
MONOCYTES NFR BLD AUTO: 6.4 % (ref 0–13.4)
NEUTROPHILS # BLD AUTO: 4.46 K/UL (ref 1.82–7.42)
NEUTROPHILS NFR BLD: 81.1 % (ref 44–72)
NITRITE UR QL STRIP.AUTO: NEGATIVE
NRBC # BLD AUTO: 0 K/UL
NRBC BLD-RTO: 0 /100 WBC
OPIATES UR QL SCN: NEGATIVE
OXYCODONE UR QL SCN: NEGATIVE
PCP UR QL SCN: NEGATIVE
PH UR STRIP.AUTO: 6.5 [PH] (ref 5–8)
PLATELET # BLD AUTO: 183 K/UL (ref 164–446)
PLATELET BLD QL SMEAR: NORMAL
PMV BLD AUTO: 10.8 FL (ref 9–12.9)
POLYCHROMASIA BLD QL SMEAR: NORMAL
POTASSIUM SERPL-SCNC: 4.5 MMOL/L (ref 3.6–5.5)
PROPOXYPH UR QL SCN: NEGATIVE
PROT UR QL STRIP: NEGATIVE MG/DL
RBC # BLD AUTO: 4.79 M/UL (ref 4.7–6.1)
RBC # URNS HPF: ABNORMAL /HPF
RBC BLD AUTO: PRESENT
RBC UR QL AUTO: ABNORMAL
SODIUM SERPL-SCNC: 129 MMOL/L (ref 135–145)
SP GR UR STRIP.AUTO: <=1.005
TROPONIN T SERPL-MCNC: 14 NG/L (ref 6–19)
WBC # BLD AUTO: 5.5 K/UL (ref 4.8–10.8)
WBC #/AREA URNS HPF: ABNORMAL /HPF

## 2022-05-31 PROCEDURE — 80307 DRUG TEST PRSMV CHEM ANLYZR: CPT

## 2022-05-31 PROCEDURE — A9270 NON-COVERED ITEM OR SERVICE: HCPCS | Performed by: INTERNAL MEDICINE

## 2022-05-31 PROCEDURE — 99285 EMERGENCY DEPT VISIT HI MDM: CPT

## 2022-05-31 PROCEDURE — 84484 ASSAY OF TROPONIN QUANT: CPT

## 2022-05-31 PROCEDURE — 70450 CT HEAD/BRAIN W/O DYE: CPT | Mod: ME

## 2022-05-31 PROCEDURE — 99233 SBSQ HOSP IP/OBS HIGH 50: CPT | Performed by: INTERNAL MEDICINE

## 2022-05-31 PROCEDURE — 93005 ELECTROCARDIOGRAM TRACING: CPT

## 2022-05-31 PROCEDURE — 80162 ASSAY OF DIGOXIN TOTAL: CPT

## 2022-05-31 PROCEDURE — 700105 HCHG RX REV CODE 258: Performed by: INTERNAL MEDICINE

## 2022-05-31 PROCEDURE — 700102 HCHG RX REV CODE 250 W/ 637 OVERRIDE(OP): Performed by: INTERNAL MEDICINE

## 2022-05-31 PROCEDURE — 82140 ASSAY OF AMMONIA: CPT

## 2022-05-31 PROCEDURE — 81001 URINALYSIS AUTO W/SCOPE: CPT

## 2022-05-31 PROCEDURE — 85025 COMPLETE CBC W/AUTO DIFF WBC: CPT

## 2022-05-31 PROCEDURE — 87086 URINE CULTURE/COLONY COUNT: CPT

## 2022-05-31 PROCEDURE — 36415 COLL VENOUS BLD VENIPUNCTURE: CPT

## 2022-05-31 PROCEDURE — 82962 GLUCOSE BLOOD TEST: CPT

## 2022-05-31 PROCEDURE — 82077 ASSAY SPEC XCP UR&BREATH IA: CPT

## 2022-05-31 PROCEDURE — 93005 ELECTROCARDIOGRAM TRACING: CPT | Performed by: EMERGENCY MEDICINE

## 2022-05-31 PROCEDURE — 87040 BLOOD CULTURE FOR BACTERIA: CPT

## 2022-05-31 PROCEDURE — 770006 HCHG ROOM/CARE - MED/SURG/GYN SEMI*

## 2022-05-31 PROCEDURE — 80048 BASIC METABOLIC PNL TOTAL CA: CPT

## 2022-05-31 PROCEDURE — 94760 N-INVAS EAR/PLS OXIMETRY 1: CPT

## 2022-05-31 RX ORDER — INSULIN DEGLUDEC INJECTION 100 U/ML
8 INJECTION, SOLUTION SUBCUTANEOUS DAILY
COMMUNITY
End: 2022-07-06

## 2022-05-31 RX ORDER — HYDRALAZINE HYDROCHLORIDE 20 MG/ML
10 INJECTION INTRAMUSCULAR; INTRAVENOUS EVERY 4 HOURS PRN
Status: DISCONTINUED | OUTPATIENT
Start: 2022-05-31 | End: 2022-06-03 | Stop reason: HOSPADM

## 2022-05-31 RX ORDER — ONDANSETRON 2 MG/ML
4 INJECTION INTRAMUSCULAR; INTRAVENOUS EVERY 4 HOURS PRN
Status: DISCONTINUED | OUTPATIENT
Start: 2022-05-31 | End: 2022-06-03 | Stop reason: HOSPADM

## 2022-05-31 RX ORDER — SODIUM CHLORIDE 9 MG/ML
INJECTION, SOLUTION INTRAVENOUS CONTINUOUS
Status: ACTIVE | OUTPATIENT
Start: 2022-05-31 | End: 2022-06-01

## 2022-05-31 RX ORDER — DAPAGLIFLOZIN 10 MG/1
5 TABLET, FILM COATED ORAL DAILY
COMMUNITY
Start: 2022-05-23 | End: 2022-09-14 | Stop reason: SDUPTHER

## 2022-05-31 RX ORDER — SUCRALFATE 1 G/1
1 TABLET ORAL 4 TIMES DAILY
COMMUNITY
Start: 2022-03-31 | End: 2022-07-26

## 2022-05-31 RX ORDER — AMOXICILLIN 250 MG
2 CAPSULE ORAL 2 TIMES DAILY
Status: DISCONTINUED | OUTPATIENT
Start: 2022-06-01 | End: 2022-06-03 | Stop reason: HOSPADM

## 2022-05-31 RX ORDER — EPLERENONE 25 MG/1
12.5 TABLET, FILM COATED ORAL DAILY
Status: DISCONTINUED | OUTPATIENT
Start: 2022-05-31 | End: 2022-05-31

## 2022-05-31 RX ORDER — HYDROXYUREA 500 MG/1
500 CAPSULE ORAL 2 TIMES DAILY
Status: DISCONTINUED | OUTPATIENT
Start: 2022-05-31 | End: 2022-06-03 | Stop reason: HOSPADM

## 2022-05-31 RX ORDER — ONDANSETRON 4 MG/1
4 TABLET, ORALLY DISINTEGRATING ORAL EVERY 4 HOURS PRN
Status: DISCONTINUED | OUTPATIENT
Start: 2022-05-31 | End: 2022-06-03 | Stop reason: HOSPADM

## 2022-05-31 RX ORDER — TAMSULOSIN HYDROCHLORIDE 0.4 MG/1
0.4 CAPSULE ORAL DAILY
Status: DISCONTINUED | OUTPATIENT
Start: 2022-06-01 | End: 2022-06-03 | Stop reason: HOSPADM

## 2022-05-31 RX ORDER — EPLERENONE 25 MG/1
12.5 TABLET, FILM COATED ORAL DAILY
COMMUNITY
End: 2022-06-14

## 2022-05-31 RX ORDER — SULFAMETHOXAZOLE AND TRIMETHOPRIM 800; 160 MG/1; MG/1
1 TABLET ORAL EVERY 12 HOURS
Status: DISCONTINUED | OUTPATIENT
Start: 2022-05-31 | End: 2022-06-01

## 2022-05-31 RX ORDER — ACETAMINOPHEN 325 MG/1
650 TABLET ORAL EVERY 6 HOURS PRN
Status: DISCONTINUED | OUTPATIENT
Start: 2022-05-31 | End: 2022-06-01

## 2022-05-31 RX ORDER — FINASTERIDE 5 MG/1
5 TABLET, FILM COATED ORAL
Status: DISCONTINUED | OUTPATIENT
Start: 2022-06-01 | End: 2022-06-03 | Stop reason: HOSPADM

## 2022-05-31 RX ORDER — POLYETHYLENE GLYCOL 3350 17 G/17G
1 POWDER, FOR SOLUTION ORAL
Status: DISCONTINUED | OUTPATIENT
Start: 2022-05-31 | End: 2022-06-03 | Stop reason: HOSPADM

## 2022-05-31 RX ORDER — BISACODYL 10 MG
10 SUPPOSITORY, RECTAL RECTAL
Status: DISCONTINUED | OUTPATIENT
Start: 2022-05-31 | End: 2022-06-03 | Stop reason: HOSPADM

## 2022-05-31 RX ADMIN — SODIUM CHLORIDE: 9 INJECTION, SOLUTION INTRAVENOUS at 16:29

## 2022-05-31 RX ADMIN — SULFAMETHOXAZOLE AND TRIMETHOPRIM 1 TABLET: 800; 160 TABLET ORAL at 17:48

## 2022-05-31 RX ADMIN — APIXABAN 5 MG: 5 TABLET, FILM COATED ORAL at 17:42

## 2022-05-31 RX ADMIN — HYDROXYUREA 500 MG: 500 CAPSULE ORAL at 17:42

## 2022-05-31 ASSESSMENT — ENCOUNTER SYMPTOMS
VOMITING: 0
DIARRHEA: 0
DOUBLE VISION: 0
FOCAL WEAKNESS: 0
CHILLS: 0
SORE THROAT: 0
DIZZINESS: 0
BACK PAIN: 0
SHORTNESS OF BREATH: 0
SEIZURES: 0
PALPITATIONS: 0
LOSS OF CONSCIOUSNESS: 0
HEADACHES: 0
COUGH: 0
HALLUCINATIONS: 0
FEVER: 0
ABDOMINAL PAIN: 0
NAUSEA: 0
BLURRED VISION: 0
FALLS: 0

## 2022-05-31 ASSESSMENT — COGNITIVE AND FUNCTIONAL STATUS - GENERAL
MOBILITY SCORE: 19
MOVING TO AND FROM BED TO CHAIR: A LITTLE
DAILY ACTIVITIY SCORE: 24
STANDING UP FROM CHAIR USING ARMS: A LITTLE
CLIMB 3 TO 5 STEPS WITH RAILING: A LOT
WALKING IN HOSPITAL ROOM: A LITTLE
SUGGESTED CMS G CODE MODIFIER DAILY ACTIVITY: CH
SUGGESTED CMS G CODE MODIFIER MOBILITY: CK

## 2022-05-31 ASSESSMENT — LIFESTYLE VARIABLES
ALCOHOL_USE: NO
TOTAL SCORE: 0
HAVE YOU EVER FELT YOU SHOULD CUT DOWN ON YOUR DRINKING: NO
TOTAL SCORE: 0
CONSUMPTION TOTAL: NEGATIVE
HOW MANY TIMES IN THE PAST YEAR HAVE YOU HAD 5 OR MORE DRINKS IN A DAY: 0
EVER HAD A DRINK FIRST THING IN THE MORNING TO STEADY YOUR NERVES TO GET RID OF A HANGOVER: NO
HAVE PEOPLE ANNOYED YOU BY CRITICIZING YOUR DRINKING: NO
SUBSTANCE_ABUSE: 0
EVER FELT BAD OR GUILTY ABOUT YOUR DRINKING: NO
TOTAL SCORE: 0
ON A TYPICAL DAY WHEN YOU DRINK ALCOHOL HOW MANY DRINKS DO YOU HAVE: 0
AVERAGE NUMBER OF DAYS PER WEEK YOU HAVE A DRINK CONTAINING ALCOHOL: 0

## 2022-05-31 ASSESSMENT — CHA2DS2 SCORE
AGE 75 OR GREATER: NO
CHA2DS2 VASC SCORE: 6
AGE 65 TO 74: YES
DIABETES: YES
HYPERTENSION: NO
SEX: MALE
VASCULAR DISEASE: YES
CHF OR LEFT VENTRICULAR DYSFUNCTION: YES
PRIOR STROKE OR TIA OR THROMBOEMBOLISM: YES

## 2022-05-31 ASSESSMENT — FIBROSIS 4 INDEX
FIB4 SCORE: 2.09
FIB4 SCORE: 2.09

## 2022-05-31 ASSESSMENT — PAIN DESCRIPTION - PAIN TYPE
TYPE: ACUTE PAIN
TYPE: ACUTE PAIN

## 2022-05-31 NOTE — ED PROVIDER NOTES
"ED Provider Note    Scribed for Diaz Avery M.D. by Francesco Saba. 5/31/2022  12:39 PM    Primary care provider: Tala Dunne M.D.  Means of arrival: Walk-in  History obtained from: Patient  History limited by: Altered mental status.    CHIEF COMPLAINT  Chief Complaint   Patient presents with   • Altered Mental Status     Pt. Reports \"I think I'm allergic to something called ER\". Pt. Is very repetitive, stating \"I'm a [place] heart patient, talk to them\". Pt. Is non-contributory otherwise to triage questions at this time.        HPI  Francesco Schulte is a 71 y.o. male who presents to the Emergency Department for evaluation of altered mental status. He states that he was being treated by [facility]. They gave him a medication called \"ER\", and he is having an allergic reaction to it. [facility] told him to come here because \"his numbers look really bad\". He complains of \"heart pain\". He is very repetitive and does not answer questions appropriately    HPI limited secondary to altered mental status.    REVIEW OF SYSTEMS  Pertinent positives include: chest pain.    ROS limited secondary to altered mental status.    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), Agent orange exposure, Anesthesia, Cancer ([facility]), Diabetes ([facility]), Family history of punctured lung (2002), Heart attack ([facility]) (03/2017), Hemorrhagic disorder ([facility]), High cholesterol, Ischemic cardiomyopathy, Kidney stones, Lupus ([facility]) (11/15/2019), Orthostatic hypotension (3/29/2018), Pain, Polycythemia vera(238.4), Stroke ([facility]) (2016), Urinary bladder disorder, and Vertigo.    SURGICAL HISTORY   has a past surgical history that includes other cardiac surgery; cath placement; laminotomy; appendectomy; carotid endarterectomy (Right, 3/21/2018); temporal artery biopsy (Right, 6/26/2018); incis/drain scrotum/testis,epididym (Right, 7/25/2020); explore scrotum (Right, 7/29/2020); explore scrotum " "(Right, 7/31/2020); skin abscess incision and drainage (Right, 8/3/2020); split thickness skin graft (N/A, 8/23/2020); aicd implant (07/29/2021); and jacqueline (N/A, 10/30/2021).    SOCIAL HISTORY  Social History     Tobacco Use   • Smoking status: Never Smoker   • Smokeless tobacco: Never Used   Vaping Use   • Vaping Use: Never used   Substance Use Topics   • Alcohol use: No   • Drug use: No      Social History     Substance and Sexual Activity   Drug Use No       FAMILY HISTORY  Family History   Problem Relation Age of Onset   • Ovarian Cancer Neg Hx    • Diabetes Neg Hx        CURRENT MEDICATIONS  Current Outpatient Medications   Medication Instructions   • apixaban (ELIQUIS) 5 mg, Oral, 2 TIMES DAILY   • Blood Glucose Monitoring Suppl (ONETOUCH VERIO) w/Device Kit 1 Device, Does not apply, 3 TIMES DAILY BEFORE MEALS   • Blood Glucose Test Strips verio test strips for glucose monitoring TID and PRN   • digoxin (LANOXIN) 125 mcg, Oral, DAILY   • docusate sodium (COLACE) 100 mg, Oral, 2 TIMES DAILY PRN   • finasteride (PROSCAR) 5 mg, Oral, EVERY DAY   • glucose blood strip 1 Strip, Other, PRN   • hydroxyurea (HYDREA) 500 mg, Oral, 2 TIMES DAILY   • nitroglycerin (NITROSTAT) 0.4 mg, Sublingual, PRN   • tamsulosin (FLOMAX) 0.4 mg, Oral, DAILY       ALLERGIES  Allergies   Allergen Reactions   • Doxycycline      Swelling lips and difficulties swallowing   • Atorvastatin      Pt and pts wife are not sure what happens    • Beta Adrenergic Blockers Unspecified     dizziness   • Metformin Unspecified and Palpitations     Cardiac effects  \"Similar to a heart attack, I was hospitalized\"   • Simvastatin      Other reaction(s): Other (Specify with Comments)  Myalgias  Myalgias   • Statins [Hmg-Coa-R Inhibitors]      Muscle ache, joint pain       PHYSICAL EXAM  VITAL SIGNS: BP (!) 185/112   Pulse 83   Temp 35.8 °C (96.5 °F) (Temporal)   Resp 20   Ht 1.854 m (6' 1\")   SpO2 96%   BMI 24.20 kg/m²     Nursing note and vitals " reviewed.  Constitutional: Well-developed and well-nourished. No distress.   HENT: Head is normocephalic and atraumatic. Oropharynx is clear and moist without exudate or erythema.   Eyes: Pupils are equal, round, and reactive to light. Conjunctiva are normal.   Cardiovascular: Normal rate and regular rhythm. No murmur heard. Normal radial pulses.  Pulmonary/Chest: Breath sounds normal. No wheezes or rales.   Abdominal: Soft and non-tender. No distention    Musculoskeletal: Extremities exhibit normal range of motion without edema or tenderness.   Neurological: A&O x1.  Spontaneously moves bilateral upper and lower extremities.  Seems somewhat confused.  However this is inconsistent.  Patient is able to give all of his medication list by name of what he took today.  But then patient will seem to simply repeat that he is allergic to ER  Skin: Skin is warm and dry. No rash.   Psychiatric: Unable to assess    DIAGNOSTIC STUDIES / PROCEDURES    EKG Interpretation  Interpreted by me as below.    LABS  Results for orders placed or performed during the hospital encounter of 05/31/22   CBC WITH DIFFERENTIAL   Result Value Ref Range    WBC 5.5 4.8 - 10.8 K/uL    RBC 4.79 4.70 - 6.10 M/uL    Hemoglobin 17.1 14.0 - 18.0 g/dL    Hematocrit 50.9 42.0 - 52.0 %    .3 (H) 81.4 - 97.8 fL    MCH 35.7 (H) 27.0 - 33.0 pg    MCHC 33.6 (L) 33.7 - 35.3 g/dL    RDW 68.0 (H) 35.9 - 50.0 fL    Platelet Count 183 164 - 446 K/uL    MPV 10.8 9.0 - 12.9 fL    Neutrophils-Polys 81.10 (H) 44.00 - 72.00 %    Lymphocytes 9.10 (L) 22.00 - 41.00 %    Monocytes 6.40 0.00 - 13.40 %    Eosinophils 1.60 0.00 - 6.90 %    Basophils 1.10 0.00 - 1.80 %    Immature Granulocytes 0.70 0.00 - 0.90 %    Nucleated RBC 0.00 /100 WBC    Neutrophils (Absolute) 4.46 1.82 - 7.42 K/uL    Lymphs (Absolute) 0.50 (L) 1.00 - 4.80 K/uL    Monos (Absolute) 0.35 0.00 - 0.85 K/uL    Eos (Absolute) 0.09 0.00 - 0.51 K/uL    Baso (Absolute) 0.06 0.00 - 0.12 K/uL    Immature  Granulocytes (abs) 0.04 0.00 - 0.11 K/uL    NRBC (Absolute) 0.00 K/uL    Anisocytosis 1+     Macrocytosis 1+    BASIC METABOLIC PANEL   Result Value Ref Range    Sodium 129 (L) 135 - 145 mmol/L    Potassium 4.5 3.6 - 5.5 mmol/L    Chloride 94 (L) 96 - 112 mmol/L    Co2 23 20 - 33 mmol/L    Glucose 266 (H) 65 - 99 mg/dL    Bun 19 8 - 22 mg/dL    Creatinine 1.09 0.50 - 1.40 mg/dL    Calcium 9.7 8.4 - 10.2 mg/dL    Anion Gap 12.0 7.0 - 16.0   TROPONIN   Result Value Ref Range    Troponin T 14 6 - 19 ng/L   URINALYSIS CULTURE, IF INDICATED    Specimen: Urine, Clean Catch   Result Value Ref Range    Micro Urine Req Microscopic    AMMONIA   Result Value Ref Range    Ammonia <10 (L) 11 - 45 umol/L   ETHYL ALCOHOL (BLOOD)   Result Value Ref Range    Diagnostic Alcohol <10.1 <10.1 mg/dL   ESTIMATED GFR   Result Value Ref Range    GFR (CKD-EPI) 73 >60 mL/min/1.73 m 2   PLATELET ESTIMATE   Result Value Ref Range    Plt Estimation Normal    MORPHOLOGY   Result Value Ref Range    RBC Morphology Present     Polychromia 1+    DIFFERENTIAL COMMENT   Result Value Ref Range    Comments-Diff see below    EKG   Result Value Ref Range    Report       [facility] Emergency Dept[facility]    Test Date:  2022  Pt Name:    MARZENA ROMEO             Department: [facility]  MRN:        [id number]                      [facility]  Gender:     Male                         Technician:   :        1950                   Requested By:ER TRIAGE PROTOCOL  Order #:    [id number]                    Reading MD: STEPHANIE SMITH MD    Measurements  Intervals                                Axis  Rate:       84                           P:  NC:                                      QRS:        -48  QRSD:       110                          T:          106  QT:         396  QTc:        469    Interpretive Statements  SINUS RHYTHM  INCOMPLETE LEFT BUNDLE BRANCH BLOCK  ANTERIOR INFARCT, AGE INDETERMINATE  Compared to ECG  01/04/2022 07:43:12  Left bundle-branch block now present  Myocardial infarct finding now present  Ventricular premature complex(es) no longer present  Intraventricular conduction delay no longer present   Left ventricular hypertrophy no longer present  Early repolarization no longer present  Electronically Signed On 5- 14:18:24 PDT by STEPHANIE SMITH MD       All labs reviewed by me.    RADIOLOGY  CT-HEAD W/O   Final Result      1.  There is no acute intracranial hemorrhage or infarct.      2.  White matter lucencies most consistent with small vessel ischemic change versus demyelination or gliosis.      3.  There is cerebral atrophy.           The radiologist's interpretation of all radiological studies have been reviewed by me.    COURSE & MEDICAL DECISION MAKING  Nursing notes, VS, PMSFHx reviewed in chart.     12:39 PM - Patient seen and examined at bedside. Ordered CT-head w/o, Ammonia, Diagnostic alcohol, Urine drug screen, CBC with diff., BMP, Troponin, Blood culture, Urinalysis, and EKG  to evaluate his symptoms. The differential diagnoses include but are not limited to: Intracranial hemorrhage, CVA, electrolyte abnormality, delirium, medication side effect, drug or alcohol intoxication    1:10 PM - Ordered Differential comment, Digoxin, Morphology, and Platelet estimate to evaluate his symptoms.     2:20 PM - Ordered Urine microscopic w/ UA to evaluate his symptoms.    2:47 PM - Spoke to Dr. King (Hospitalist) who will evaluate the patient.      CT scan shows no evidence of intracranial hemorrhage or acute abnormality.  Laboratory studies remarkable for hyperglycemia without any significant acidosis.  No significant hyponatremia.  Pseudohyponatremia noted.  He will be admitted to the hospital for further evaluation.    DISPOSITION:  Patient will be hospitalized by Dr. King in guarded condition.    FINAL IMPRESSION  1. Altered mental status, unspecified altered mental status type    2. Hyperglycemia           I, Francesco Saba (Scribe), am scribing for, and in the presence of, Diaz Avery M.D..    Electronically signed by: Francesco Saba (Jeffiblauren), 5/31/2022    IDiaz M.D. personally performed the services described in this documentation, as scribed by Francesco Saba in my presence, and it is both accurate and complete.    The note accurately reflects work and decisions made by me.  Diaz Avery M.D.  5/31/2022  4:06 PM     C

## 2022-05-31 NOTE — ED NOTES
"Pt AO2-3 (disoriented to time and has difficulty with recalling specifics to medical situation.) Mildly labored breathing at resting, pt having some difficulty with speaking full sentences. Lungs clear throughout to auscultation. Placed on cardiac monitor: SR 70-80s, serial vs in place.   Pt continually repeating that he is having an \"allergic reaction to ER, you need to call ct heart transplant \"  "

## 2022-05-31 NOTE — DISCHARGE PLANNING
Met with pt who reports that he is independent with ADLs and IADLs. Sometimes he uses a cane. Pt said he does not need any assistance. This information was verified by wife, Alysa.     Information regarding PCP and insurance confirmed with wife. Pt uses [facility] [place].       Care Transition Team Assessment    Information Source  Orientation Level: Disoriented to time, Disoriented to situation  Information Given By: Patient, Spouse  Informant's Name: Verified information with Alysa  Who is responsible for making decisions for patient? : Patient    Readmission Evaluation  Is this a readmission?: No    Elopement Risk  Legal Hold: No    Interdisciplinary Discharge Planning  Does Admitting Nurse Feel This Could be a Complex Discharge?: No  Primary Care Physician: Dr. Sheri Dunne  Lives with - Patient's Self Care Capacity: Spouse  Support Systems: Spouse / Significant Other  Housing / Facility: [address]  Do You Take your Prescribed Medications Regularly: Yes  Able to Return to Previous ADL's: Yes  Mobility Issues: No  Prior Services: None, Home-Independent  Patient Prefers to be Discharged to:: Home  Assistance Needed: No  Durable Medical Equipment: Other - Specify (uses a cane)    Discharge Preparedness  What is your plan after discharge?: Home with help  What are your discharge supports?: Child, Spouse  Prior Functional Level: Ambulatory, Drives Self, Independent with Activities of Daily Living, Independent with Medication Management  Difficulity with ADLs: None  Difficulity with IADLs: None    Functional Assesment  Prior Functional Level: Ambulatory, Drives Self, Independent with Activities of Daily Living, Independent with Medication Management    Finances  Financial Barriers to Discharge: No  Prescription Coverage: Yes (Goes to [facility] [place])    Vision / Hearing Impairment  Vision Impairment : Yes  Right Eye Vision: Wears Glasses  Left Eye Vision: Wears Glasses  Hearing Impairment : No    Values  / Beliefs / Concerns  Values / Beliefs Concerns : No    Advance Directive  Advance Directive?: Living Will (Advance Directive)    Domestic Abuse  Have you ever been the victim of abuse or violence?: No    Discharge Risks or Barriers  Discharge risks or barriers?: No  Patient risk factors: Vulnerable adult    Anticipated Discharge Information  Discharge Disposition: Discharged to home/self care (01)

## 2022-05-31 NOTE — ED NOTES
Med rec updated and complete, per [facility] @ [phone]  Allergies reviewed, per historical history  Pt reports that he only remembers taking his ELIQUIS, HYDROXYUREA and TRESIBA today @ 0800.  Pt can't remember what other medications he takes or when he took them last.  Called pts wife (Alysa) @ [phone], per wife reports that she is not sure what medications he is taking.  Per wife reports pt only gets his medications from [facility]  Called [facility] @ [phone] to verify all medications, per pharmacy pt last filled ELIQUIS 5MG on 9/2021 for 30 day supply and HYDROXYUREA 500MG on 3/28/2022 for 30 day supply.  Pt reports that he took both of these medications today @ 0800.  Per pharmacy did not have TRESIBA 100 units list for pt, per pt reports that he takes TRESIBA  8 units.  Pts pharmacy reports no antibiotics in the last 30 days.

## 2022-05-31 NOTE — ASSESSMENT & PLAN NOTE
"Na 129 on presentation  Patient providing a nonsyncical history  States that his [place] cardiologist had sent him into ED as they were concerned about him having an allergic reaction to a medicine \"ER\" last dose of which was on Friday  "

## 2022-05-31 NOTE — ED NOTES
UA collected and sent to lab.   Pt resting on gurney, no distress noted at this time. Currently pt denies any chest pain or sob. vss

## 2022-05-31 NOTE — ED TRIAGE NOTES
Unable to complete triage at this time as pt. is not answering questions information reviewed historically. GCS 13.

## 2022-05-31 NOTE — ASSESSMENT & PLAN NOTE
Urinalysis consistent with UTI  Culture requested  Bactrim changed to IV zosyn, patient has history of ESBL E.coli    UCx returned negative for bacteria, stopping Zosyn.

## 2022-05-31 NOTE — H&P
"Hospital Medicine History & Physical Note    Date of Service  5/31/2022    Primary Care Physician  Tala Dunne M.D.      Code Status  Full Code    Chief Complaint  Chief Complaint   Patient presents with   • Altered Mental Status     Pt. Reports \"I think I'm allergic to something called ER\". Pt. Is very repetitive, stating \"I'm a [facility] patient, talk to them\". Pt. Is non-contributory otherwise to triage questions at this time.        History of Presenting Illness  Francesco Schulte is a 71 y.o. male who presented 5/31/2022 with nonspecific complaints.  History is significant for atrial fibrillation on Eliquis, CAD, CHF, right atrial mass.  Last hospitalized and November 2021 for MSSA bacteremia, endocarditis with subsequent removal of ICD.  Patient presents today stating that he was \"told to immediately come to [facility] because I am having an allergic reaction to a medication called the ER\".  Patient is unable to provide further detail regarding what medication he is referring to; does believe his last dose was on Friday 5/27.  He is unable to provide any contact information to [place].  Patient reports acute onset of chest pain, bilateral thigh pain earlier today which prompted him to present to the ED.  Patient states that he drove himself to the ER and \"passed out several times\" on his way here.  Upon presentation to the ED.  Presenting vitals: /112, HR 83, RR 20, T96.5.  Initial laboratory evaluation was remarkable for macrocytosis with MCV of 106, sodium 129, opponent 14    Review of Systems  Review of Systems   Constitutional: Negative for chills and fever.   HENT: Negative for congestion and sore throat.    Eyes: Negative for blurred vision and double vision.   Respiratory: Negative for cough and shortness of breath.    Cardiovascular: Negative for chest pain and palpitations.   Gastrointestinal: Negative for abdominal pain, diarrhea, nausea and vomiting.   Genitourinary: " "Negative for dysuria and frequency.   Musculoskeletal: Negative for back pain and falls.   Skin: Negative for rash.   Neurological: Negative for dizziness, focal weakness, seizures, loss of consciousness and headaches.   Psychiatric/Behavioral: Negative for hallucinations and substance abuse.       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), Agent orange exposure, Anesthesia, Cancer ([facility]), Diabetes ([facility]), Family history of punctured lung (2002), Heart attack ([facility]) (03/2017), Hemorrhagic disorder ([facility]), High cholesterol, Ischemic cardiomyopathy, Kidney stones, Lupus ([facility]) (11/15/2019), Orthostatic hypotension (3/29/2018), Pain, Polycythemia vera(238.4), Stroke ([facility]) (2016), Urinary bladder disorder, and Vertigo.    Surgical History   has a past surgical history that includes other cardiac surgery; cath placement; laminotomy; appendectomy; carotid endarterectomy (Right, 3/21/2018); temporal artery biopsy (Right, 6/26/2018); pr incis/drain scrotum/testis,epididym (Right, 7/25/2020); pr explore scrotum (Right, 7/29/2020); pr explore scrotum (Right, 7/31/2020); skin abscess incision and drainage (Right, 8/3/2020); split thickness skin graft (N/A, 8/23/2020); aicd implant (07/29/2021); and jacqueline (N/A, 10/30/2021).     Family History  family history is not on file.   Family history reviewed with patient. There is no family history that is pertinent to the chief complaint.     Social History   reports that he has never smoked. He has never used smokeless tobacco. He reports that he does not drink alcohol and does not use drugs.    Allergies  Allergies   Allergen Reactions   • Doxycycline      Swelling lips and difficulties swallowing   • Atorvastatin      Pt and pts wife are not sure what happens    • Beta Adrenergic Blockers Unspecified     dizziness   • Metformin Unspecified and Palpitations     Cardiac effects  \"Similar to a heart attack, I was hospitalized\"   • " Simvastatin      Other reaction(s): Other (Specify with Comments)  Myalgias  Myalgias   • Statins [Hmg-Coa-R Inhibitors]      Muscle ache, joint pain       Medications  Prior to Admission Medications   Prescriptions Last Dose Informant Patient Reported? Taking?   apixaban (ELIQUIS) 5mg Tab 2022 at 0800 Patient's Home Pharmacy No No   Sig: Take 1 Tablet by mouth 2 times a day.   dapagliflozin propanediol (FARXIGA) 10 MG Tab Unknown at Unknown Patient's Home Pharmacy Yes Yes   Sig: Take 5 mg by mouth every day.   eplerenone (INSPRA) 25 MG Tab Unknown at Unknown Patient's Home Pharmacy Yes Yes   Sig: Take 12.5 mg by mouth every day.   finasteride (PROSCAR) 5 MG Tab Unknown at Unknown Patient's Home Pharmacy No No   Sig: Take 1 Tablet by mouth every day.   glucose blood strip Unknown at Unknown Patient's Home Pharmacy No No   Si Strip by Other route as needed.   hydroxyurea (HYDREA) 500 MG Cap 2022 at 0800 Patient's Home Pharmacy Yes No   Sig: Take 500 mg by mouth 2 Times a Day.   nitroglycerin (NITROSTAT) 0.4 MG SL Tab Unknown at Unknown Patient's Home Pharmacy No No   Sig: Place 1 Tablet under the tongue as needed for Chest Pain (or hypertension >180/110).   sucralfate (CARAFATE) 1 GM Tab Unknown at Unknown Patient's Home Pharmacy Yes No   Sig: Take 1 g by mouth 4 times a day.   tamsulosin (FLOMAX) 0.4 MG capsule Unknown at Unknown Patient's Home Pharmacy No No   Sig: Take 1 Capsule by mouth every day.      Facility-Administered Medications: None       Physical Exam  Temp:  [35.8 °C (96.5 °F)] 35.8 °C (96.5 °F)  Pulse:  [78-86] 86  Resp:  [17-20] 17  BP: (153-185)/() 153/91  SpO2:  [96 %-99 %] 98 %  Blood Pressure : (!) 153/91   Temperature: 35.8 °C (96.5 °F)   Pulse: 86   Respiration: 17   Pulse Oximetry: 98 %       Physical Exam    Laboratory:  Recent Labs     22  1310   WBC 5.5   RBC 4.79   HEMOGLOBIN 17.1   HEMATOCRIT 50.9   .3*   MCH 35.7*   MCHC 33.6*   RDW 68.0*   PLATELETCT 183  "  MPV 10.8     Recent Labs     05/31/22  1310   SODIUM 129*   POTASSIUM 4.5   CHLORIDE 94*   CO2 23   GLUCOSE 266*   BUN 19   CREATININE 1.09   CALCIUM 9.7     Recent Labs     05/31/22  1310   GLUCOSE 266*         No results for input(s): NTPROBNP in the last 72 hours.      Recent Labs     05/31/22  1310   TROPONINT 14       Imaging:  CT-HEAD W/O   Final Result      1.  There is no acute intracranial hemorrhage or infarct.      2.  White matter lucencies most consistent with small vessel ischemic change versus demyelination or gliosis.      3.  There is cerebral atrophy.             EKG:  I have personally reviewed the images and compared with prior images.    Assessment/Plan:  Justification for Admission Status  I anticipate this patient will require at least two midnights for appropriate medical management, necessitating inpatient admission because of electrolyte derangement causing altered mentation, will need to correct sodium slowly so as not to cause more harm to patient    * Toxic metabolic encephalopathy  Assessment & Plan  Secondary to UTI, hyponatremia  Treat underlying conditions    Acute cystitis  Assessment & Plan  Urinalysis consistent with UTI  Culture requested  bactrim    Hyponatremia- (present on admission)  Assessment & Plan  Na 129 on presentation  Patient providing a nonsyncical history  States that his [place] cardiologist had sent him into ED as they were concerned about him having an allergic reaction to a medicine \"ER\" last dose of which was on Friday    Paroxysmal A-fib (HCC)- (present on admission)  Assessment & Plan  On eliquis  Doesn't appear to be on rate control    Benign prostatic hyperplasia with urinary retention- (present on admission)  Assessment & Plan  Flowmax, proscar    Essential hypertension- (present on admission)  Assessment & Plan  Continue eplerenone      VTE prophylaxis: SCDs/TEDs and therapeutic anticoagulation with eliquis  "

## 2022-06-01 ENCOUNTER — APPOINTMENT (OUTPATIENT)
Dept: RADIOLOGY | Facility: MEDICAL CENTER | Age: 72
DRG: 689 | End: 2022-06-01
Attending: HOSPITALIST
Payer: MEDICARE

## 2022-06-01 LAB
ALBUMIN SERPL BCP-MCNC: 3.2 G/DL (ref 3.2–4.9)
BASOPHILS # BLD AUTO: 1.2 % (ref 0–1.8)
BASOPHILS # BLD: 0.06 K/UL (ref 0–0.12)
BUN SERPL-MCNC: 19 MG/DL (ref 8–22)
CALCIUM SERPL-MCNC: 9.4 MG/DL (ref 8.4–10.2)
CHLORIDE SERPL-SCNC: 102 MMOL/L (ref 96–112)
CO2 SERPL-SCNC: 21 MMOL/L (ref 20–33)
CREAT SERPL-MCNC: 0.88 MG/DL (ref 0.5–1.4)
EOSINOPHIL # BLD AUTO: 0.12 K/UL (ref 0–0.51)
EOSINOPHIL NFR BLD: 2.4 % (ref 0–6.9)
ERYTHROCYTE [DISTWIDTH] IN BLOOD BY AUTOMATED COUNT: 66.8 FL (ref 35.9–50)
GFR SERPLBLD CREATININE-BSD FMLA CKD-EPI: 93 ML/MIN/1.73 M 2
GLUCOSE BLD STRIP.AUTO-MCNC: 277 MG/DL (ref 65–99)
GLUCOSE SERPL-MCNC: 238 MG/DL (ref 65–99)
HCT VFR BLD AUTO: 51.1 % (ref 42–52)
HGB BLD-MCNC: 17.1 G/DL (ref 14–18)
IMM GRANULOCYTES # BLD AUTO: 0.04 K/UL (ref 0–0.11)
IMM GRANULOCYTES NFR BLD AUTO: 0.8 % (ref 0–0.9)
LYMPHOCYTES # BLD AUTO: 0.39 K/UL (ref 1–4.8)
LYMPHOCYTES NFR BLD: 7.6 % (ref 22–41)
MAGNESIUM SERPL-MCNC: 2 MG/DL (ref 1.5–2.5)
MCH RBC QN AUTO: 35.5 PG (ref 27–33)
MCHC RBC AUTO-ENTMCNC: 33.5 G/DL (ref 33.7–35.3)
MCV RBC AUTO: 106 FL (ref 81.4–97.8)
MONOCYTES # BLD AUTO: 0.41 K/UL (ref 0–0.85)
MONOCYTES NFR BLD AUTO: 8 % (ref 0–13.4)
NEUTROPHILS # BLD AUTO: 4.08 K/UL (ref 1.82–7.42)
NEUTROPHILS NFR BLD: 80 % (ref 44–72)
NRBC # BLD AUTO: 0 K/UL
NRBC BLD-RTO: 0 /100 WBC
PHOSPHATE SERPL-MCNC: 3.1 MG/DL (ref 2.5–4.5)
PLATELET # BLD AUTO: 170 K/UL (ref 164–446)
PMV BLD AUTO: 11.3 FL (ref 9–12.9)
POTASSIUM SERPL-SCNC: 4.1 MMOL/L (ref 3.6–5.5)
RBC # BLD AUTO: 4.82 M/UL (ref 4.7–6.1)
SODIUM SERPL-SCNC: 134 MMOL/L (ref 135–145)
TROPONIN T SERPL-MCNC: 13 NG/L (ref 6–19)
WBC # BLD AUTO: 5.1 K/UL (ref 4.8–10.8)

## 2022-06-01 PROCEDURE — 770006 HCHG ROOM/CARE - MED/SURG/GYN SEMI*

## 2022-06-01 PROCEDURE — 83735 ASSAY OF MAGNESIUM: CPT

## 2022-06-01 PROCEDURE — 93005 ELECTROCARDIOGRAM TRACING: CPT | Performed by: HOSPITALIST

## 2022-06-01 PROCEDURE — 700111 HCHG RX REV CODE 636 W/ 250 OVERRIDE (IP): Performed by: INTERNAL MEDICINE

## 2022-06-01 PROCEDURE — 84484 ASSAY OF TROPONIN QUANT: CPT

## 2022-06-01 PROCEDURE — 99233 SBSQ HOSP IP/OBS HIGH 50: CPT | Performed by: INTERNAL MEDICINE

## 2022-06-01 PROCEDURE — A9270 NON-COVERED ITEM OR SERVICE: HCPCS | Performed by: INTERNAL MEDICINE

## 2022-06-01 PROCEDURE — 700105 HCHG RX REV CODE 258: Performed by: INTERNAL MEDICINE

## 2022-06-01 PROCEDURE — 71045 X-RAY EXAM CHEST 1 VIEW: CPT

## 2022-06-01 PROCEDURE — 700102 HCHG RX REV CODE 250 W/ 637 OVERRIDE(OP): Performed by: INTERNAL MEDICINE

## 2022-06-01 PROCEDURE — 700111 HCHG RX REV CODE 636 W/ 250 OVERRIDE (IP): Performed by: HOSPITALIST

## 2022-06-01 PROCEDURE — 80069 RENAL FUNCTION PANEL: CPT

## 2022-06-01 PROCEDURE — 85025 COMPLETE CBC W/AUTO DIFF WBC: CPT

## 2022-06-01 RX ORDER — ACETAMINOPHEN 325 MG/1
650 TABLET ORAL EVERY 6 HOURS PRN
Status: DISCONTINUED | OUTPATIENT
Start: 2022-06-01 | End: 2022-06-02

## 2022-06-01 RX ORDER — MORPHINE SULFATE 4 MG/ML
1 INJECTION INTRAVENOUS ONCE
Status: COMPLETED | OUTPATIENT
Start: 2022-06-01 | End: 2022-06-01

## 2022-06-01 RX ADMIN — APIXABAN 5 MG: 5 TABLET, FILM COATED ORAL at 17:44

## 2022-06-01 RX ADMIN — APIXABAN 5 MG: 5 TABLET, FILM COATED ORAL at 05:35

## 2022-06-01 RX ADMIN — HYDROXYUREA 500 MG: 500 CAPSULE ORAL at 17:44

## 2022-06-01 RX ADMIN — HYDROXYUREA 500 MG: 500 CAPSULE ORAL at 05:35

## 2022-06-01 RX ADMIN — PIPERACILLIN AND TAZOBACTAM 3.38 G: 3; .375 INJECTION, POWDER, LYOPHILIZED, FOR SOLUTION INTRAVENOUS; PARENTERAL at 17:44

## 2022-06-01 RX ADMIN — MORPHINE SULFATE 1 MG: 4 INJECTION INTRAVENOUS at 23:05

## 2022-06-01 RX ADMIN — SULFAMETHOXAZOLE AND TRIMETHOPRIM 1 TABLET: 800; 160 TABLET ORAL at 05:35

## 2022-06-01 RX ADMIN — TAMSULOSIN HYDROCHLORIDE 0.4 MG: 0.4 CAPSULE ORAL at 05:35

## 2022-06-01 RX ADMIN — PIPERACILLIN AND TAZOBACTAM 3.38 G: 3; .375 INJECTION, POWDER, LYOPHILIZED, FOR SOLUTION INTRAVENOUS; PARENTERAL at 13:37

## 2022-06-01 RX ADMIN — SENNOSIDES AND DOCUSATE SODIUM 2 TABLET: 50; 8.6 TABLET ORAL at 17:44

## 2022-06-01 ASSESSMENT — ENCOUNTER SYMPTOMS
FEVER: 0
VOMITING: 0
CHILLS: 0
CONSTIPATION: 0
DIZZINESS: 0
MEMORY LOSS: 1
NAUSEA: 0
DIARRHEA: 0
HEADACHES: 0
PALPITATIONS: 0
BACK PAIN: 0
ABDOMINAL PAIN: 0
COUGH: 0
SHORTNESS OF BREATH: 0

## 2022-06-01 ASSESSMENT — PAIN DESCRIPTION - PAIN TYPE
TYPE: ACUTE PAIN;CHRONIC PAIN
TYPE: ACUTE PAIN

## 2022-06-01 NOTE — CARE PLAN
The patient is Watcher - Medium risk of patient condition declining or worsening    Shift Goals  Clinical Goals: U/A, reorient pt  Patient Goals: Have a BM    Progress made toward(s) clinical / shift goals:      U/A collected, waiting for cultures. Pt is orientedx3, disoriented to situation. Pt is reoriented when needed. Pt had a BM yesterday on 5/31. Stool softeners will be given as scheduled.     Problem: Knowledge Deficit - Standard  Goal: Patient and family/care givers will demonstrate understanding of plan of care, disease process/condition, diagnostic tests and medications  Outcome: Progressing     Problem: Pain - Standard  Goal: Alleviation of pain or a reduction in pain to the patient’s comfort goal  Outcome: Progressing     Problem: Psychosocial  Goal: Patient's level of anxiety will decrease  Outcome: Progressing  Goal: Patient's ability to verbalize feelings about condition will improve  Outcome: Progressing  Goal: Patient's ability to re-evaluate and adapt role responsibilities will improve  Outcome: Progressing  Goal: Patient and family will demonstrate ability to cope with life altering diagnosis and/or procedure  Outcome: Progressing  Goal: Spiritual and cultural needs incorporated into hospitalization  Outcome: Progressing     Problem: Communication  Goal: The ability to communicate needs accurately and effectively will improve  Outcome: Progressing     Problem: Discharge Barriers/Planning  Goal: Patient's continuum of care needs are met  Outcome: Progressing     Problem: Hemodynamics  Goal: Patient's hemodynamics, fluid balance and neurologic status will be stable or improve  Outcome: Progressing     Problem: Respiratory  Goal: Patient will achieve/maintain optimum respiratory ventilation and gas exchange  Outcome: Progressing     Problem: Chest Tube Management  Goal: Complications related to chest tube will be avoided or minimized  Outcome: Progressing     Problem: Fluid Volume  Goal: Fluid volume  balance will be maintained  Outcome: Progressing     Problem: Mechanical Ventilation  Goal: Safe management of artificial airway and ventilation  Outcome: Progressing  Goal: Successful weaning off mechanical ventilator, spontaneously maintains adequate gas exchange  Outcome: Progressing  Goal: Patient will be able to express needs and understand communication  Outcome: Progressing     Problem: Dysphagia  Goal: Dysphagia will improve  Outcome: Progressing     Problem: Risk for Aspiration  Goal: Patient's risk for aspiration will be absent or decrease  Outcome: Progressing     Problem: Nutrition  Goal: Patient's nutritional and fluid intake will be adequate or improve  Outcome: Progressing  Goal: Enteral nutrition will be maintained or improve  Outcome: Progressing  Goal: Enteral nutrition will be maintained or improve  Outcome: Progressing     Problem: Urinary Elimination  Goal: Establish and maintain regular urinary output  Outcome: Progressing     Problem: Bowel Elimination  Goal: Establish and maintain regular bowel function  Outcome: Progressing     Problem: Gastrointestinal Irritability  Goal: Nausea and vomiting will be absent or improve  Outcome: Progressing  Goal: Diarrhea will be absent or improved  Outcome: Progressing     Problem: Rectal Tube  Goal: Fecal output will be contained and skin will remain free from irritation  Outcome: Progressing     Problem: Mobility  Goal: Patient's capacity to carry out activities will improve  Outcome: Progressing     Problem: Self Care  Goal: Patient will have the ability to perform ADLs independently or with assistance (bathe, groom, dress, toilet and feed)  Outcome: Progressing     Problem: Infection - Standard  Goal: Patient will remain free from infection  Outcome: Progressing     Problem: Wound/ / Incision Healing  Goal: Patient's wound/surgical incision will decrease in size and heals properly  Outcome: Progressing       Patient is not progressing towards the  following goals:

## 2022-06-01 NOTE — HOSPITAL COURSE
"71-year-old male with a past medical history of AICD for HFrEF, BPH, hypertension, paroxysmal atrial fibrillation on Eliquis at home, CAD admitted on 5/31/2022 for altered mental state, patient believing he was allergic to something called \"ER.\"  He states he passed out several times before arriving to the ED.  He states he was told by [place] cardiologist to come to the ER.  Patient was admitted for acute encephalopathy possibly from hypertension versus drug reaction versus an acute UTI.  "

## 2022-06-01 NOTE — PROGRESS NOTES
4 Eyes Skin Assessment Completed by JOSLYN Vitale and JOSLYN Du.    Head WDL  Ears WDL  Nose WDL  Mouth WDL  Neck WDL  Breast/Chest WDL  Shoulder Blades WDL  Spine Redness  (R) Arm/Elbow/Hand WDL  (L) Arm/Elbow/Hand WDL  Abdomen WDL  Groin WDL  Scrotum/Coccyx/Buttocks WDL  (R) Leg WDL  (L) Leg WDL  (R) Heel/Foot/Toe Dry/Flaky  (L) Heel/Foot/Toe Dry/Flaky          Devices In Places SCD's      Interventions In Place N/A    Possible Skin Injury No    Pictures Uploaded Into Epic N/A  Wound Consult Placed N/A  RN Wound Prevention Protocol Ordered No

## 2022-06-01 NOTE — CARE PLAN
Problem: Knowledge Deficit - Standard  Goal: Patient and family/care givers will demonstrate understanding of plan of care, disease process/condition, diagnostic tests and medications  Outcome: Progressing     Problem: Psychosocial  Goal: Patient's level of anxiety will decrease  Outcome: Progressing     Problem: Discharge Barriers/Planning  Goal: Patient's continuum of care needs are met  Outcome: Progressing   The patient is Watcher - Medium risk of patient condition declining or worsening    Shift Goals  Clinical Goals: reorient to why pt is here  Patient Goals: talk to the MD in the morning    Progress made toward(s) clinical / shift goals:  Pt educated on why he is here. Medications discussed in detail to address anxiety regarding what medications he is receiving.    Patient is not progressing towards the following goals:

## 2022-06-01 NOTE — PROGRESS NOTES
Pt refusing IV fluids at this time. Educated on use and reason for fluids. Still continues to refuse.

## 2022-06-01 NOTE — CARE PLAN
The patient is Stable - Low risk of patient condition declining or worsening    Shift Goals  Clinical Goals: treatment of UTI and hyponatremia  Patient Goals: eat and rest.    Progress made toward(s) clinical / shift goals:    Problem: Urinary Elimination  Goal: Establish and maintain regular urinary output  Outcome: Progressing  Note: Urine output is frequent.       Patient is not progressing towards the following goals:      Problem: Communication  Goal: The ability to communicate needs accurately and effectively will improve  Outcome: Not Progressing  Note: Pt is a poor historian. Pt is fixated on [place] transplant team. Pt does not understand why he was hospitalized.

## 2022-06-01 NOTE — PROGRESS NOTES
"Hospital Medicine Daily Progress Note    Date of Service  6/1/2022    Chief Complaint  Francesco Schulte is a 71 y.o. male admitted 5/31/2022 with  Chief Complaint   Patient presents with   • Altered Mental Status     Pt. Reports \"I think I'm allergic to something called ER\". Pt. Is very repetitive, stating \"I'm a [place] heart patient, talk to them\". Pt. Is non-contributory otherwise to triage questions at this time.        Hospital Course  71-year-old male with a past medical history of AICD for HFrEF, BPH, hypertension, paroxysmal atrial fibrillation on Eliquis at home, CAD admitted on 5/31/2022 for altered mental state, patient believing he was allergic to something called \"ER.\"  He states he passed out several times before arriving to the ED.  He states he was told by [place] cardiologist to come to the ER.  Patient was admitted for acute encephalopathy possibly from hypertension versus drug reaction versus an acute UTI.    Interval Problem Update  Patient repeating same concerns from initial admission, stating he was in bad shape, he was told by his [facility] cardiologist to come to the ED. He states he took a medication called ER. On outside [place] notes, patient was supposed to stop Eplerenone 5/23/22.    Called Daughter Pelon Omalley.  Patient has been getting infections, November patient had infection from pacemaker.  UTI patient causes him to get suspicion and changes his mentation, paranoia and suspicion. Patient has hard time with his memory already. Patient is working with [place] for cardiology. Eplerenone stopped due to hypotension. Daughter lives in [place].  Patient was reporting chest pain to [facility] physician.    On review from previous notes, there was mention of a cardiac mass and possible endocarditis.  Patient had MBSS bacteremia prior, he was to complete Ancef by 12/15/2021.  Patient had a PAMELA on 10/24/2021.    I have personally seen and examined the patient at bedside. I " discussed the plan of care with patient, bedside RN, charge RN,  and pharmacy.    Consultants/Specialty  none    Code Status  Full Code    Disposition  Patient is not medically cleared for discharge.   Anticipate discharge to to home with close outpatient follow-up.  I have placed the appropriate orders for post-discharge needs.    Review of Systems  Review of Systems   Constitutional: Negative for chills, fever and malaise/fatigue.   Respiratory: Negative for cough and shortness of breath.    Cardiovascular: Negative for chest pain and palpitations.   Gastrointestinal: Negative for abdominal pain, constipation, diarrhea, nausea and vomiting.   Musculoskeletal: Negative for back pain and joint pain.   Neurological: Negative for dizziness and headaches.   Psychiatric/Behavioral: Positive for memory loss.   All other systems reviewed and are negative.       Physical Exam  Temp:  [36.2 °C (97.1 °F)-36.8 °C (98.3 °F)] 36.2 °C (97.1 °F)  Pulse:  [70-84] 70  Resp:  [16-20] 16  BP: (112-136)/(75-81) 112/75  SpO2:  [95 %-99 %] 98 %    Physical Exam  Vitals and nursing note reviewed.   Constitutional:       General: He is not in acute distress.     Appearance: Normal appearance. He is not diaphoretic.   Cardiovascular:      Rate and Rhythm: Normal rate and regular rhythm.      Pulses: Normal pulses.      Heart sounds: Normal heart sounds. No murmur heard.  Pulmonary:      Effort: Pulmonary effort is normal. No respiratory distress.      Breath sounds: Normal breath sounds. No wheezing or rales.   Abdominal:      General: Abdomen is flat. Bowel sounds are normal. There is no distension.      Palpations: Abdomen is soft.      Tenderness: There is no abdominal tenderness.   Musculoskeletal:         General: No swelling or tenderness. Normal range of motion.   Skin:     General: Skin is warm.      Capillary Refill: Capillary refill takes less than 2 seconds.      Coloration: Skin is not jaundiced or pale.  "  Neurological:      General: No focal deficit present.      Mental Status: He is alert and oriented to person, place, and time. Mental status is at baseline.   Psychiatric:         Mood and Affect: Mood normal.      Comments: Cooperative. Fixated thinking.         Fluids    Intake/Output Summary (Last 24 hours) at 6/1/2022 1606  Last data filed at 6/1/2022 1426  Gross per 24 hour   Intake 360 ml   Output 400 ml   Net -40 ml       Laboratory  Recent Labs     05/31/22  1310 06/01/22  0140   WBC 5.5 5.1   RBC 4.79 4.82   HEMOGLOBIN 17.1 17.1   HEMATOCRIT 50.9 51.1   .3* 106.0*   MCH 35.7* 35.5*   MCHC 33.6* 33.5*   RDW 68.0* 66.8*   PLATELETCT 183 170   MPV 10.8 11.3     Recent Labs     05/31/22  1310 06/01/22  0140   SODIUM 129* 134*   POTASSIUM 4.5 4.1   CHLORIDE 94* 102   CO2 23 21   GLUCOSE 266* 238*   BUN 19 19   CREATININE 1.09 0.88   CALCIUM 9.7 9.4                   Imaging  CT-HEAD W/O   Final Result      1.  There is no acute intracranial hemorrhage or infarct.      2.  White matter lucencies most consistent with small vessel ischemic change versus demyelination or gliosis.      3.  There is cerebral atrophy.              Assessment/Plan  * Toxic metabolic encephalopathy- (present on admission)  Assessment & Plan  Secondary to UTI, hyponatremia  Treat underlying conditions  10/2020 patient had ESBL E. Coli    Acute cystitis- (present on admission)  Assessment & Plan  Urinalysis consistent with UTI  Culture requested  Bactrim changed to IV zosyn, patient has history of ESBL E.coli    Hyponatremia- (present on admission)  Assessment & Plan  Na 129 on presentation  Patient providing a nonsyncical history  States that his [place] cardiologist had sent him into ED as they were concerned about him having an allergic reaction to a medicine \"ER\" last dose of which was on Friday    AICD (automatic cardioverter/defibrillator) present- (present on admission)  Assessment & Plan  Pulse generator is a [employer] [place] [employer] model D233   Serial number [id number]    Functioning    Essential hypertension- (present on admission)  Assessment & Plan  Continue eplerenone    Chronic systolic heart failure (HCC)- (present on admission)  Assessment & Plan  Last echo showed 01/22 LVEF 15%  Cardiac MRI showed no right atrial mass, LVEF 26% (3/23/22)    Paroxysmal A-fib (HCC)- (present on admission)  Assessment & Plan  On eliquis  Doesn't appear to be on rate control    Benign prostatic hyperplasia with urinary retention- (present on admission)  Assessment & Plan  Flowmax, proscar       VTE prophylaxis: therapeutic anticoagulation with eliquis    I have performed a physical exam and reviewed and updated ROS and Plan today (6/1/2022). In review of yesterday's note (5/31/2022), there are no changes except as documented above.

## 2022-06-01 NOTE — PROGRESS NOTES
Assumed pt care. AOx4. Denies any pain at this time.  Denies any other distress.  Discussed POC, RN collected U/A from pt.  Pt verbalized understanding.  Hourly rounding in place. Fall precautions in place and call lights w/in reach.

## 2022-06-01 NOTE — PROGRESS NOTES
RN received pt report from Sangita JORGENSEN. Pt being transferred from the ER. Upon arrival the pt transferred himself to the hospital bed. He was alert and oriented x4. Assessment revealed nothing remarkable and no acute distress. Admit profile was completed. Will continue to monitor.

## 2022-06-02 PROBLEM — R09.82 POST-NASAL DRIP: Status: ACTIVE | Noted: 2022-06-02

## 2022-06-02 LAB
BACTERIA UR CULT: NORMAL
EKG IMPRESSION: NORMAL
GLUCOSE BLD STRIP.AUTO-MCNC: 282 MG/DL (ref 65–99)
GLUCOSE BLD STRIP.AUTO-MCNC: 296 MG/DL (ref 65–99)
S PYO DNA SPEC NAA+PROBE: NOT DETECTED
SIGNIFICANT IND 70042: NORMAL
SITE SITE: NORMAL
SOURCE SOURCE: NORMAL
TROPONIN T SERPL-MCNC: 15 NG/L (ref 6–19)
TROPONIN T SERPL-MCNC: 16 NG/L (ref 6–19)

## 2022-06-02 PROCEDURE — 700111 HCHG RX REV CODE 636 W/ 250 OVERRIDE (IP): Performed by: INTERNAL MEDICINE

## 2022-06-02 PROCEDURE — 700102 HCHG RX REV CODE 250 W/ 637 OVERRIDE(OP): Performed by: INTERNAL MEDICINE

## 2022-06-02 PROCEDURE — A9270 NON-COVERED ITEM OR SERVICE: HCPCS | Performed by: HOSPITALIST

## 2022-06-02 PROCEDURE — 99232 SBSQ HOSP IP/OBS MODERATE 35: CPT | Performed by: INTERNAL MEDICINE

## 2022-06-02 PROCEDURE — A9270 NON-COVERED ITEM OR SERVICE: HCPCS | Performed by: INTERNAL MEDICINE

## 2022-06-02 PROCEDURE — 82962 GLUCOSE BLOOD TEST: CPT | Mod: 91

## 2022-06-02 PROCEDURE — 700105 HCHG RX REV CODE 258: Performed by: INTERNAL MEDICINE

## 2022-06-02 PROCEDURE — 93010 ELECTROCARDIOGRAM REPORT: CPT | Performed by: INTERNAL MEDICINE

## 2022-06-02 PROCEDURE — 700102 HCHG RX REV CODE 250 W/ 637 OVERRIDE(OP): Performed by: HOSPITALIST

## 2022-06-02 PROCEDURE — 84484 ASSAY OF TROPONIN QUANT: CPT | Mod: 91

## 2022-06-02 PROCEDURE — 87651 STREP A DNA AMP PROBE: CPT

## 2022-06-02 PROCEDURE — 770006 HCHG ROOM/CARE - MED/SURG/GYN SEMI*

## 2022-06-02 RX ORDER — HYDROMORPHONE HYDROCHLORIDE 1 MG/ML
0.25 INJECTION, SOLUTION INTRAMUSCULAR; INTRAVENOUS; SUBCUTANEOUS
Status: DISCONTINUED | OUTPATIENT
Start: 2022-06-02 | End: 2022-06-03 | Stop reason: HOSPADM

## 2022-06-02 RX ORDER — CHOLECALCIFEROL (VITAMIN D3) 125 MCG
5 CAPSULE ORAL NIGHTLY
Status: DISCONTINUED | OUTPATIENT
Start: 2022-06-02 | End: 2022-06-03 | Stop reason: HOSPADM

## 2022-06-02 RX ORDER — OXYCODONE HYDROCHLORIDE 5 MG/1
5 TABLET ORAL
Status: DISCONTINUED | OUTPATIENT
Start: 2022-06-02 | End: 2022-06-03 | Stop reason: HOSPADM

## 2022-06-02 RX ORDER — ACETAMINOPHEN 325 MG/1
650 TABLET ORAL EVERY 6 HOURS PRN
Status: DISCONTINUED | OUTPATIENT
Start: 2022-06-02 | End: 2022-06-03 | Stop reason: HOSPADM

## 2022-06-02 RX ORDER — AMOXICILLIN AND CLAVULANATE POTASSIUM 875; 125 MG/1; MG/1
1 TABLET, FILM COATED ORAL EVERY 12 HOURS
Status: DISCONTINUED | OUTPATIENT
Start: 2022-06-02 | End: 2022-06-03 | Stop reason: HOSPADM

## 2022-06-02 RX ORDER — OXYCODONE HYDROCHLORIDE 5 MG/1
2.5 TABLET ORAL
Status: DISCONTINUED | OUTPATIENT
Start: 2022-06-02 | End: 2022-06-03 | Stop reason: HOSPADM

## 2022-06-02 RX ADMIN — HYDROXYUREA 500 MG: 500 CAPSULE ORAL at 17:40

## 2022-06-02 RX ADMIN — AMOXICILLIN AND CLAVULANATE POTASSIUM 1 TABLET: 875; 125 TABLET, FILM COATED ORAL at 17:40

## 2022-06-02 RX ADMIN — HYDROXYUREA 500 MG: 500 CAPSULE ORAL at 05:06

## 2022-06-02 RX ADMIN — SENNOSIDES AND DOCUSATE SODIUM 2 TABLET: 50; 8.6 TABLET ORAL at 05:08

## 2022-06-02 RX ADMIN — TAMSULOSIN HYDROCHLORIDE 0.4 MG: 0.4 CAPSULE ORAL at 05:05

## 2022-06-02 RX ADMIN — APIXABAN 5 MG: 5 TABLET, FILM COATED ORAL at 17:40

## 2022-06-02 RX ADMIN — Medication 5 MG: at 21:10

## 2022-06-02 RX ADMIN — APIXABAN 5 MG: 5 TABLET, FILM COATED ORAL at 05:06

## 2022-06-02 RX ADMIN — PIPERACILLIN AND TAZOBACTAM 3.38 G: 3; .375 INJECTION, POWDER, LYOPHILIZED, FOR SOLUTION INTRAVENOUS; PARENTERAL at 05:06

## 2022-06-02 RX ADMIN — INSULIN HUMAN 3 UNITS: 100 INJECTION, SOLUTION PARENTERAL at 17:30

## 2022-06-02 ASSESSMENT — ENCOUNTER SYMPTOMS
SHORTNESS OF BREATH: 0
ABDOMINAL PAIN: 0
VOMITING: 0
DIZZINESS: 0
FEVER: 0
DIARRHEA: 0
PALPITATIONS: 0
COUGH: 0
SORE THROAT: 1
MEMORY LOSS: 0
CHILLS: 0
NAUSEA: 0
CONSTIPATION: 0
HEADACHES: 0
BACK PAIN: 0

## 2022-06-02 ASSESSMENT — PAIN DESCRIPTION - PAIN TYPE
TYPE: ACUTE PAIN

## 2022-06-02 NOTE — ASSESSMENT & PLAN NOTE
Patient stating he has had a mucous or dripping feeling from his throat, felt sore, and difficult to eat.  Patient had symptom improvement with IV zosyn, but not ceftriaxone.  - checking strep throat swab  - starting Augmentin for possible sinus infection

## 2022-06-02 NOTE — CARE PLAN
Problem: Pain - Standard  Goal: Alleviation of pain or a reduction in pain to the patient’s comfort goal  Outcome: Progressing     Problem: Communication  Goal: The ability to communicate needs accurately and effectively will improve  Outcome: Progressing     Problem: Hemodynamics  Goal: Patient's hemodynamics, fluid balance and neurologic status will be stable or improve  Outcome: Progressing   The patient is Watcher - Medium risk of patient condition declining or worsening    Shift Goals  Clinical Goals: monitor neuro status  Patient Goals: sleep    Progress made toward(s) clinical / shift goals: no changes in neuro staus, pt medicated for pain to aid in sleep    Patient is not progressing towards the following goals:

## 2022-06-02 NOTE — PROGRESS NOTES
Telemetry Shift Summary    Rhythm SR  HR Range 65-67  Ectopy PVC, Couplets  Measurements 0.20/0.10/0.40        Normal Values  Rhythm SR  HR Range    Measurements 0.12-0.20 / 0.06-0.10  / 0.30-0.52

## 2022-06-02 NOTE — PROGRESS NOTES
"Hospital Medicine Daily Progress Note    Date of Service  6/2/2022    Chief Complaint  Francesco Schulte is a 71 y.o. male admitted 5/31/2022 with  Chief Complaint   Patient presents with   • Altered Mental Status     Pt. Reports \"I think I'm allergic to something called ER\". Pt. Is very repetitive, stating \"I'm a [place] heart patient, talk to them\". Pt. Is non-contributory otherwise to triage questions at this time.        Hospital Course  71-year-old male with a past medical history of AICD for HFrEF, BPH, hypertension, paroxysmal atrial fibrillation on Eliquis at home, CAD admitted on 5/31/2022 for altered mental state, patient believing he was allergic to something called \"ER.\"  He states he passed out several times before arriving to the ED.  He states he was told by [place] cardiologist to come to the ER.  Patient was admitted for acute encephalopathy possibly from hypertension versus drug reaction versus an acute UTI.    Interval Problem Update  Patient stated he was feeling better today, stated his sore throat was better. He did not appear to be fixated on yesterday's \"ER\" medication.  UCx showed negative results, no bacteria. Stopped Zosyn.   Patient c/o of sore throat, checking throat swab for strep throat. Starting Augmentin.    I have personally seen and examined the patient at bedside. I discussed the plan of care with patient, bedside RN, charge RN,  and pharmacy.    Consultants/Specialty  none    Code Status  Full Code    Disposition  Patient is not medically cleared for discharge.   Anticipate discharge to to home with close outpatient follow-up.  I have placed the appropriate orders for post-discharge needs.    Review of Systems  Review of Systems   Constitutional: Negative for chills, fever and malaise/fatigue.   HENT: Positive for sore throat.    Respiratory: Negative for cough and shortness of breath.    Cardiovascular: Negative for chest pain and palpitations. "   Gastrointestinal: Negative for abdominal pain, constipation, diarrhea, nausea and vomiting.   Musculoskeletal: Negative for back pain and joint pain.   Neurological: Negative for dizziness and headaches.   Psychiatric/Behavioral: Negative for memory loss.   All other systems reviewed and are negative.       Physical Exam  Temp:  [36.2 °C (97.1 °F)-36.6 °C (97.9 °F)] 36.6 °C (97.9 °F)  Pulse:  [67-74] 73  Resp:  [16-20] 18  BP: (112-148)/(74-78) 145/78  SpO2:  [96 %-98 %] 97 %    Physical Exam  Vitals and nursing note reviewed.   Constitutional:       General: He is not in acute distress.     Appearance: He is not diaphoretic.   HENT:      Mouth/Throat:      Mouth: Mucous membranes are dry.      Pharynx: Posterior oropharyngeal erythema present.   Cardiovascular:      Rate and Rhythm: Normal rate and regular rhythm.      Pulses: Normal pulses.      Heart sounds: Normal heart sounds. No murmur heard.  Pulmonary:      Effort: Pulmonary effort is normal. No respiratory distress.      Breath sounds: Normal breath sounds. No wheezing or rales.   Abdominal:      General: Abdomen is flat. Bowel sounds are normal. There is no distension.      Palpations: Abdomen is soft.      Tenderness: There is no abdominal tenderness.   Musculoskeletal:         General: No swelling or tenderness. Normal range of motion.   Skin:     General: Skin is warm.      Capillary Refill: Capillary refill takes less than 2 seconds.      Coloration: Skin is not jaundiced or pale.   Neurological:      Mental Status: He is alert and oriented to person, place, and time. Mental status is at baseline.   Psychiatric:         Mood and Affect: Mood normal.      Comments: Cooperative. Pleasant.         Fluids    Intake/Output Summary (Last 24 hours) at 6/2/2022 1255  Last data filed at 6/2/2022 0953  Gross per 24 hour   Intake 340 ml   Output 550 ml   Net -210 ml       Laboratory  Recent Labs     05/31/22  1310 06/01/22  0140   WBC 5.5 5.1   RBC 4.79 4.82  "  HEMOGLOBIN 17.1 17.1   HEMATOCRIT 50.9 51.1   .3* 106.0*   MCH 35.7* 35.5*   MCHC 33.6* 33.5*   RDW 68.0* 66.8*   PLATELETCT 183 170   MPV 10.8 11.3     Recent Labs     05/31/22  1310 06/01/22  0140   SODIUM 129* 134*   POTASSIUM 4.5 4.1   CHLORIDE 94* 102   CO2 23 21   GLUCOSE 266* 238*   BUN 19 19   CREATININE 1.09 0.88   CALCIUM 9.7 9.4                   Imaging  DX-CHEST-PORTABLE (1 VIEW)   Final Result      No acute cardiac or pulmonary abnormalities are identified.      CT-HEAD W/O   Final Result      1.  There is no acute intracranial hemorrhage or infarct.      2.  White matter lucencies most consistent with small vessel ischemic change versus demyelination or gliosis.      3.  There is cerebral atrophy.              Assessment/Plan  * Toxic metabolic encephalopathy- (present on admission)  Assessment & Plan  Secondary to UTI, hyponatremia  Treat underlying conditions  10/2020 patient had ESBL E. Coli    Post-nasal drip- (present on admission)  Assessment & Plan  Patient stating he has had a mucous or dripping feeling from his throat, felt sore, and difficult to eat.  Patient had symptom improvement with IV zosyn, but not ceftriaxone.  - checking strep throat swab  - starting Augmentin for possible sinus infection    Acute cystitis- (present on admission)  Assessment & Plan  Urinalysis consistent with UTI  Culture requested  Bactrim changed to IV zosyn, patient has history of ESBL E.coli    UCx returned negative for bacteria, stopping Zosyn.    Hyponatremia- (present on admission)  Assessment & Plan  Na 129 on presentation  Patient providing a nonsyncical history  States that his [place] cardiologist had sent him into ED as they were concerned about him having an allergic reaction to a medicine \"ER\" last dose of which was on Friday    AICD (automatic cardioverter/defibrillator) present- (present on admission)  Assessment & Plan  Pulse generator is a [employer] model D233   Serial number " [id number]    Functioning    Essential hypertension- (present on admission)  Assessment & Plan  Continue eplerenone    Chronic systolic heart failure (HCC)- (present on admission)  Assessment & Plan  Last echo showed 01/22 LVEF 15%  Cardiac MRI showed no right atrial mass, LVEF 26% (3/23/22)    Paroxysmal A-fib (HCC)- (present on admission)  Assessment & Plan  On eliquis  Doesn't appear to be on rate control    Benign prostatic hyperplasia with urinary retention- (present on admission)  Assessment & Plan  Flowmax, proscar       VTE prophylaxis: therapeutic anticoagulation with eliquis    I have performed a physical exam and reviewed and updated ROS and Plan today (6/2/2022). In review of yesterday's note (6/1/2022), there are no changes except as documented above.

## 2022-06-02 NOTE — PROGRESS NOTES
Telemetry Shift Summary     Rhythm: SR   HR Range: 68-85  Ectopy: rPVC  Measurements: 0.16/0.12/0.40           Normal Values  Rhythm SR  HR Range    Measurements 0.12-0.20 / 0.06-0.10  / 0.30-0.52

## 2022-06-02 NOTE — PROGRESS NOTES
Called to pt bedside for pain 10/10 chest pain. Dr Stephens notified, EKG performed. New orders received. VSS.

## 2022-06-02 NOTE — PROGRESS NOTES
OVERNIGHT HOSPITALIST:    Paged by nursing Staff. Patient was c/p CP.    EKG: NSR at 70 bpm with incomplete LBBB and LVH. No acute ST elevation           Added Portable chest and  Serial Troponin. Also Cardiac Monitoring and will follow up closely.

## 2022-06-03 VITALS
WEIGHT: 171.3 LBS | BODY MASS INDEX: 21.3 KG/M2 | RESPIRATION RATE: 18 BRPM | TEMPERATURE: 97.6 F | HEIGHT: 75 IN | SYSTOLIC BLOOD PRESSURE: 117 MMHG | HEART RATE: 70 BPM | OXYGEN SATURATION: 98 % | DIASTOLIC BLOOD PRESSURE: 76 MMHG

## 2022-06-03 PROBLEM — E87.1 HYPONATREMIA: Status: RESOLVED | Noted: 2022-05-31 | Resolved: 2022-06-03

## 2022-06-03 PROBLEM — J01.10 ACUTE NON-RECURRENT FRONTAL SINUSITIS: Status: ACTIVE | Noted: 2022-06-03

## 2022-06-03 PROBLEM — G92.8 TOXIC METABOLIC ENCEPHALOPATHY: Status: RESOLVED | Noted: 2022-05-31 | Resolved: 2022-06-03

## 2022-06-03 PROBLEM — R09.82 POST-NASAL DRIP: Status: RESOLVED | Noted: 2022-06-02 | Resolved: 2022-06-03

## 2022-06-03 PROBLEM — N30.00 ACUTE CYSTITIS: Status: RESOLVED | Noted: 2022-05-31 | Resolved: 2022-06-03

## 2022-06-03 LAB — GLUCOSE BLD STRIP.AUTO-MCNC: 199 MG/DL (ref 65–99)

## 2022-06-03 PROCEDURE — 82962 GLUCOSE BLOOD TEST: CPT

## 2022-06-03 PROCEDURE — A9270 NON-COVERED ITEM OR SERVICE: HCPCS | Performed by: INTERNAL MEDICINE

## 2022-06-03 PROCEDURE — 700102 HCHG RX REV CODE 250 W/ 637 OVERRIDE(OP): Performed by: INTERNAL MEDICINE

## 2022-06-03 PROCEDURE — 99239 HOSP IP/OBS DSCHRG MGMT >30: CPT | Performed by: INTERNAL MEDICINE

## 2022-06-03 RX ORDER — AMOXICILLIN AND CLAVULANATE POTASSIUM 875; 125 MG/1; MG/1
1 TABLET, FILM COATED ORAL EVERY 12 HOURS
Qty: 10 TABLET | Refills: 0 | Status: SHIPPED | OUTPATIENT
Start: 2022-06-03 | End: 2022-06-08

## 2022-06-03 RX ADMIN — APIXABAN 5 MG: 5 TABLET, FILM COATED ORAL at 05:50

## 2022-06-03 RX ADMIN — HYDROXYUREA 500 MG: 500 CAPSULE ORAL at 05:51

## 2022-06-03 RX ADMIN — AMOXICILLIN AND CLAVULANATE POTASSIUM 1 TABLET: 875; 125 TABLET, FILM COATED ORAL at 05:51

## 2022-06-03 RX ADMIN — INSULIN HUMAN 1 UNITS: 100 INJECTION, SOLUTION PARENTERAL at 05:53

## 2022-06-03 RX ADMIN — TAMSULOSIN HYDROCHLORIDE 0.4 MG: 0.4 CAPSULE ORAL at 05:51

## 2022-06-03 ASSESSMENT — FIBROSIS 4 INDEX: FIB4 SCORE: 2.25

## 2022-06-03 NOTE — PROGRESS NOTES
Telemetry Shift Summary    Rhythm SR  HR Range 63-77  Ectopy F PVC, PAC, couplets  Measurements 0.20/0.10/0.42        Normal Values  Rhythm SR  HR Range    Measurements 0.12-0.20 / 0.06-0.10  / 0.30-0.52

## 2022-06-03 NOTE — DISCHARGE INSTRUCTIONS
Discharge Instructions    Discharged to home by car with self. Discharged via wheelchair, hospital escort: Yes.  Special equipment needed: Not Applicable    Be sure to schedule a follow-up appointment with your primary care doctor or any specialists as instructed.     Discharge Plan:        I understand that a diet low in cholesterol, fat, and sodium is recommended for good health. Unless I have been given specific instructions below for another diet, I accept this instruction as my diet prescription.       Special Instructions: None    Is patient discharged on Warfarin / Coumadin?   No     Depression / Suicide Risk    As you are discharged from this [facility] facility, it is important to learn how to keep safe from harming yourself.    Recognize the warning signs:  Abrupt changes in personality, positive or negative- including increase in energy   Giving away possessions  Change in eating patterns- significant weight changes-  positive or negative  Change in sleeping patterns- unable to sleep or sleeping all the time   Unwillingness or inability to communicate  Depression  Unusual sadness, discouragement and loneliness  Talk of wanting to die  Neglect of personal appearance   Rebelliousness- reckless behavior  Withdrawal from people/activities they love  Confusion- inability to concentrate     If you or a loved one observes any of these behaviors or has concerns about self-harm, here's what you can do:  Talk about it- your feelings and reasons for harming yourself  Remove any means that you might use to hurt yourself (examples: pills, rope, extension cords, firearm)  Get professional help from the community (Mental Health, Substance Abuse, psychological counseling)  Do not be alone:Call your Safe Contact- someone whom you trust who will be there for you.  Call your local CRISIS HOTLINE 980-6284 or 759-113-3563  Call your local Children's Mobile Crisis Response Team [place] (930) 025-8610 or  www.Departing.Fast Society  Call the toll free National Suicide Prevention Hotlines   National Suicide Prevention Lifeline 299-494-GXEH (3892)  National [employer] Network 800-SUICIDE (209-0080)

## 2022-06-03 NOTE — CARE PLAN
Problem: Pain - Standard  Goal: Alleviation of pain or a reduction in pain to the patient’s comfort goal  Outcome: Progressing     Problem: Psychosocial  Goal: Patient's level of anxiety will decrease  Outcome: Progressing  Goal: Patient's ability to verbalize feelings about condition will improve  Outcome: Progressing   The patient is Stable - Low risk of patient condition declining or worsening    Shift Goals  Clinical Goals: maintain NA WNL  Patient Goals: sleep    Progress made toward(s) clinical / shift goals:  NA increased from admit    Patient is not progressing towards the following goals:

## 2022-06-03 NOTE — DISCHARGE SUMMARY
"Discharge Summary    CHIEF COMPLAINT ON ADMISSION  Chief Complaint   Patient presents with   • Altered Mental Status     Pt. Reports \"I think I'm allergic to something called ER\". Pt. Is very repetitive, stating \"I'm a [place] heart patient, talk to them\". Pt. Is non-contributory otherwise to triage questions at this time.        Reason for Admission  Chest Pain      Admission Date  5/31/2022    CODE STATUS  Full Code    HPI & HOSPITAL COURSE  This is a 71-year-old male with a past medical history of AICD for HFrEF, BPH, hypertension, paroxysmal atrial fibrillation on Eliquis at home, CAD admitted on 5/31/2022 for altered mental state, patient believing he was allergic to something called \"ER.\"  He states he passed out several times before arriving to the ED.  He states he was told by [facility] cardiologist to come to the ER.  Patient was admitted for acute encephalopathy possibly from hypertension versus drug reaction versus an acute UTI.     Patient was initially fixated on a medication he referred to as ER, which was Eplerenone, which is listed on [facility] records 05/23/2022.    After speaking with the patient's daughter, she stated that the patient had significant altered mental status whenever he would have an infection.  Patient has been showing signs of slow progression of possible cognitive dysfunction.  Although patient still has significant mental capacity and ability.  Patient apparently also drove himself to the hospital.    Patient had an complaint of both difficulty urinating as well as a sore throat.  After reviewing patient's history, he has had a UTI with ESBL E. coli.  He was started on Bactrim upon admission, this was changed to IV Zosyn which she was given for 2 days.  Patient had improvement and transition patient to oral Augmentin as urine culture did not show any significant growth.  Patient continues to do well especially his main complaint of sore throat.  It appears that " patient had sinusitis that had improved after IV antibiotics and transition to oral Augmentin.      Upon discharge, patient was back to his normal baseline and was more calm and able to participate in answering questions better compared to first day I had met him.  Patient was no longer fixated about medication that was causing him hypotension.  He was aware that he had future appointments at [facility] for his heart condition.  He was ultimately discharged to his family, I recommended for patient not to drive at the moment.  At this time patient's toxic metabolic encephalopathy present on admission was due to dual infection of acute UTI as well as acute sinusitis.      Therefore, he is discharged in good and stable condition to home with close outpatient follow-up.    The patient met 2-midnight criteria for an inpatient stay at the time of discharge.    Discharge Date  6/3/2022    FOLLOW UP ITEMS POST DISCHARGE  06/03/2022    DISCHARGE DIAGNOSES  Principal Problem (Resolved):    Toxic metabolic encephalopathy POA: Yes  Active Problems:    Benign prostatic hyperplasia with urinary retention POA: Yes    Paroxysmal A-fib (HCC) (Chronic) POA: Yes    Chronic systolic heart failure (HCC) (Chronic) POA: Yes      Overview: Patient is followed by cardiology Dr. Morales. ejection fraction 15 to 20%       on echocardiogram from January 2022.  Patient is not on ACE inhibitor's,       orbs, or beta-blocker due to severe orthostatic symptoms.  He is taking       digoxin 125 mcg daily and anticoagulated with Eliquis 5 mg tablet daily.    Essential hypertension POA: Yes    AICD (automatic cardioverter/defibrillator) present POA: Yes      Overview: July 2021: [employer] D233 implanted by Dr. Isaac.    Acute non-recurrent frontal sinusitis POA: Yes  Resolved Problems:    CAD (coronary artery disease) POA: Yes    Hyponatremia POA: Yes    Acute cystitis POA: Yes    Post-nasal drip POA: Yes      FOLLOW UP  Future Appointments   Date  Time Provider Department Center   6/14/2022  8:40 AM Tala Dunne M.D. [facility]   6/14/2022  2:40 PM Hussain Morales M.D. [facility] None     Tala Dunne M.D.  [address]  [address]  [place] [id number]  [phone]    Go on 6/14/2022  Please follow up with post hospital visit.      MEDICATIONS ON DISCHARGE     Medication List      START taking these medications      Instructions   amoxicillin-clavulanate 875-125 MG Tabs  Commonly known as: AUGMENTIN   Take 1 Tablet by mouth every 12 hours for 5 days.  Dose: 1 Tablet        CONTINUE taking these medications      Instructions   apixaban 5mg Tabs  Commonly known as: Eliquis   Take 1 Tablet by mouth 2 times a day.  Dose: 5 mg     dapagliflozin propanediol 10 MG Tabs  Commonly known as: Farxiga   Take 5 mg by mouth every day.  Dose: 5 mg     eplerenone 25 MG Tabs  Commonly known as: INSPRA   Take 12.5 mg by mouth every day.  Dose: 12.5 mg     finasteride 5 MG Tabs  Commonly known as: PROSCAR   Take 1 Tablet by mouth every day.  Dose: 5 mg     glucose blood strip   1 Strip by Other route as needed.  Dose: 1 Each     hydroxyurea 500 MG Caps  Commonly known as: HYDREA   Take 500 mg by mouth 2 Times a Day.  Dose: 500 mg     nitroglycerin 0.4 MG Subl  Commonly known as: NITROSTAT   Place 1 Tablet under the tongue as needed for Chest Pain (or hypertension >180/110).  Dose: 0.4 mg     sucralfate 1 GM Tabs  Commonly known as: CARAFATE   Take 1 g by mouth 4 times a day.  Dose: 1 g     tamsulosin 0.4 MG capsule  Commonly known as: FLOMAX   Take 1 Capsule by mouth every day.  Dose: 0.4 mg     Tresiba 100 UNIT/ML Soln  Generic drug: Insulin Degludec   Inject 8 Units under the skin every day.  Dose: 8 Units            Allergies  Allergies   Allergen Reactions   • Doxycycline      Swelling lips and difficulties swallowing   • Atorvastatin      Pt and pts wife are not sure what happens    • Beta Adrenergic Blockers Unspecified     dizziness   •  "Metformin Unspecified and Palpitations     Cardiac effects  \"Similar to a heart attack, I was hospitalized\"   • Simvastatin      Other reaction(s): Other (Specify with Comments)  Myalgias  Myalgias   • Statins [Hmg-Coa-R Inhibitors]      Muscle ache, joint pain       DIET  Orders Placed This Encounter   Procedures   • Diet Order Diet: Regular     Standing Status:   Standing     Number of Occurrences:   1     Order Specific Question:   Diet:     Answer:   Regular [1]       ACTIVITY  As tolerated.  Weight bearing as tolerated    CONSULTATIONS  none    PROCEDURES  none    LABORATORY  Lab Results   Component Value Date    SODIUM 134 (L) 06/01/2022    POTASSIUM 4.1 06/01/2022    CHLORIDE 102 06/01/2022    CO2 21 06/01/2022    GLUCOSE 238 (H) 06/01/2022    BUN 19 06/01/2022    CREATININE 0.88 06/01/2022        Lab Results   Component Value Date    WBC 5.1 06/01/2022    HEMOGLOBIN 17.1 06/01/2022    HEMATOCRIT 51.1 06/01/2022    PLATELETCT 170 06/01/2022        Total time of the discharge process exceeds 33 minutes.  More than 50% of time was spent face to face with patient.  This included but not limited to review of hospital course with patient, treatment goals upon discharge, recommendations to PCP, continued and new medications and their adverse reactions, and nursing instructions for patient.  "

## 2022-06-03 NOTE — PROGRESS NOTES
Dr. Copeland and this RN told patient he cannot drive himself home so he is waiting on his wife to finish teaching for a ride.

## 2022-06-03 NOTE — DISCHARGE PLANNING
Case Management Discharge Planning    Admission Date: 5/31/2022  GMLOS: 3  ALOS: 3    6-Clicks ADL Score: 24  6-Clicks Mobility Score: 19      Anticipated Discharge Dispo: Discharge Disposition: Discharged to home/self care (01)    DME Needed: No    Action(s) Taken: Cab voucher delivered to Kay[facility] RN as requested.    Escalations Completed: None    Medically Clear: Yes    Next Steps: DC home today via cab    Barriers to Discharge: None    Is the patient up for discharge tomorrow: No, today

## 2022-06-06 ENCOUNTER — HOSPITAL ENCOUNTER (OUTPATIENT)
Facility: MEDICAL CENTER | Age: 72
End: 2022-06-06
Attending: INTERNAL MEDICINE
Payer: MEDICARE

## 2022-06-06 PROCEDURE — U0005 INFEC AGEN DETEC AMPLI PROBE: HCPCS

## 2022-06-06 PROCEDURE — U0003 INFECTIOUS AGENT DETECTION BY NUCLEIC ACID (DNA OR RNA); SEVERE ACUTE RESPIRATORY SYNDROME CORONAVIRUS 2 (SARS-COV-2) (CORONAVIRUS DISEASE [COVID-19]), AMPLIFIED PROBE TECHNIQUE, MAKING USE OF HIGH THROUGHPUT TECHNOLOGIES AS DESCRIBED BY CMS-2020-01-R: HCPCS

## 2022-06-07 LAB
COVID ORDER STATUS COVID19: NORMAL
SARS-COV-2 RNA RESP QL NAA+PROBE: NOTDETECTED
SPECIMEN SOURCE: NORMAL

## 2022-06-08 NOTE — DOCUMENTATION QUERY
"                                                                         [facility]                                                                       Query Response Note      PATIENT:               FRANCESCO SCHULTE  ACCT #:                  [id number]  MRN:                     [id number]  :                      1950  ADMIT DATE:       2022 12:28 PM  DISCH DATE:        6/3/2022 12:54 PM  RESPONDING  PROVIDER #:        [id number]           QUERY TEXT:    Patient admitted for acute encephalopathy possibly from hypertension versus drug reaction versus an acute UTI. Toxic metabolic encephalopathy is documented throughout the medical record.  Per daughter, patient has had significant altered mental status whenever infection is present. Patient was instructed prior to arrival to stop Eplerenone due to hypotension.  Patients mentation improved with treatment of antibiotics.  Per DC summary: Upon discharge, patient was back to his normal baseline and was more calm and able to participate in answering questions better compared to first day I had met him.  Patient was no longer fixated about medication that was causing him hypotension.   No documentation of adverse reaction to medications or any type of poisoning found in the medical record  Can the type of encephalopathy be further clarified        The patient's Clinical Indicators include:  - Findings:  H&P: toxic metabolic encephalopathy secondary to UTI, hyponatremia   - ED provider note:  Francesco Schulte is a 71 y.o. male who presents to the Emergency Department for evaluation of altered mental status. He states that he was being treated by [facility]. They gave him a medication called \"ER\", and he is having an allergic reaction  to it  - Progress note :  per daughter, Patient has been getting infections, November patient had infection from pacemaker.  UTI patient causes him to get suspicion and changes his mentation, paranoia and suspicion. Patient " "has hard time with his memory already.  - progress note 6/2:  Patient stated he was feeling better today, stated his sore throat was better. He did not appear to be fixated on yesterday's \"ER\" medication.  - DC summary:  Patient was admitted for acute encephalopathy possibly from hypertension versus drug reaction versus an acute UTI.    - Treatments:  Antibiotics, cultures, apresoline    - Risk factors:  HTN, sinusitis, cystitis    Thank You,  Maggie Perry RN  Clinical Documentation   [email or web address]  Connect via [email or web address]  Options provided:   -- Metabolic encephalopathy due to UTI and / or hyponatremia   -- Toxic encephalopathy, Please specify toxic component   -- Hypertensive encephalopathy   -- Other explanation, Please specify other explanation   -- Unable to determine      Query created by: Maggie Perry on 6/8/2022 1:32 PM    RESPONSE TEXT:    Metabolic encephalopathy due to UTI and / or hyponatremia          Electronically signed by:  MAURICIO EWING MD 6/8/2022 1:44 PM              "

## 2022-06-14 ENCOUNTER — OFFICE VISIT (OUTPATIENT)
Dept: MEDICAL GROUP | Facility: MEDICAL CENTER | Age: 72
End: 2022-06-14
Payer: MEDICARE

## 2022-06-14 VITALS
TEMPERATURE: 96.9 F | OXYGEN SATURATION: 97 % | SYSTOLIC BLOOD PRESSURE: 106 MMHG | BODY MASS INDEX: 23.23 KG/M2 | WEIGHT: 175.27 LBS | HEIGHT: 73 IN | DIASTOLIC BLOOD PRESSURE: 68 MMHG | HEART RATE: 93 BPM

## 2022-06-14 DIAGNOSIS — I10 ESSENTIAL HYPERTENSION: ICD-10-CM

## 2022-06-14 DIAGNOSIS — Z79.4 TYPE 2 DIABETES MELLITUS WITHOUT COMPLICATION, WITH LONG-TERM CURRENT USE OF INSULIN (HCC): ICD-10-CM

## 2022-06-14 DIAGNOSIS — E11.9 TYPE 2 DIABETES MELLITUS WITHOUT COMPLICATION, WITH LONG-TERM CURRENT USE OF INSULIN (HCC): ICD-10-CM

## 2022-06-14 DIAGNOSIS — I25.5 ISCHEMIC CARDIOMYOPATHY: ICD-10-CM

## 2022-06-14 DIAGNOSIS — I50.22 CHRONIC SYSTOLIC HEART FAILURE (HCC): Chronic | ICD-10-CM

## 2022-06-14 DIAGNOSIS — D68.59 HYPERCOAGULABLE STATE (HCC): ICD-10-CM

## 2022-06-14 DIAGNOSIS — D75.839 THROMBOCYTOSIS: ICD-10-CM

## 2022-06-14 PROCEDURE — 99214 OFFICE O/P EST MOD 30 MIN: CPT | Performed by: INTERNAL MEDICINE

## 2022-06-14 RX ORDER — SPIRONOLACTONE 25 MG/1
12.5 TABLET ORAL
COMMUNITY
Start: 2022-04-05 | End: 2022-06-14

## 2022-06-14 ASSESSMENT — ENCOUNTER SYMPTOMS
FEVER: 0
VOMITING: 0
DEPRESSION: 0
SPEECH CHANGE: 0
SORE THROAT: 0
CHILLS: 0
SHORTNESS OF BREATH: 0
COUGH: 0
NAUSEA: 0
DIZZINESS: 1
FOCAL WEAKNESS: 0

## 2022-06-14 ASSESSMENT — FIBROSIS 4 INDEX: FIB4 SCORE: 2.25

## 2022-06-14 NOTE — PROGRESS NOTES
Subjective:     Chief Complaint   Patient presents with   • Follow-Up     Dr EWING       Diagnoses of Thrombocytosis, Type 2 diabetes mellitus without complication, with long-term current use of insulin (HCC), Essential hypertension, Hypercoagulable state (HCC), Ischemic cardiomyopathy, and Chronic systolic heart failure (HCC) were pertinent to this visit.    HPI: Francesco is a pleasant 71 y.o. male who presents today for 3-month follow-up and also he was recently admitted to the hospital.  He was admitted to [facility] from 5/31/2022 to 6/3/2022 due to sepsis secondary to UTI and acute sinusitis.  He developed acute toxic metabolic encephalopathy secondary to sepsis and also he recently started taking eplerenone.  It was advised, that his symptoms were partially related to recent initiation of eplerenone.      Patient is feeling significantly better after the discharge, he says his dizziness improved significantly.  He is not interested in physical therapy at this time.    We have reviewed all his medications, medical reconciliation done.  All questions answered.  Patient is taking all medications as prescribed.      Problem   Chronic Systolic Heart Failure (Hcc)    Patient is followed by cardiology Dr. Morales. ejection fraction 15 to 20% on echocardiogram from January 2022.    Patient is not on ACE inhibitor's, ARBs, or beta-blocker due to severe orthostatic symptoms.  He was taking digoxin 125 mcg daily it was discontinued.  He was unable to tolerate eplerenone.  He is currently anticoagulated with Eliquis 5 mg tablet daily.  After discharge from the hospital, patient followed up with his cardiology team at [facility].  He had stress test done: Stress echocardiography report from June 9, 2022.   Severely reduced systolic function 20%.  Seen and akinetic septum and apex.  Akinetic basal lateral wall.  Moderate-severe posterior wall hypokinesis.  No no formed thrombus. RVSP 22 Left ventricle is mildly  dilated.     Right heart cath - unable to acess resulsts     His team [facility], advised that he needs to have his complete blood work done in 2 to 3 weeks and then he will have a follow-up appointment with them and they will consider trying a different medication for his congestive heart failure.       Type 2 Diabetes Mellitus Without Complication, With Long-Term Current Use of Insulin (Hcc)    This is a chronic condition, managed by endocrinology Dr. Mckeon.  His current regimen includes Tresiba 8 units daily, Farxiga 5 mg daily he has been down to 6 units daily lately base don his fasting BG.   Last A1c:   Lab Results   Component Value Date/Time    HBA1C 7.2 (H) 03/31/2022 1359    AVGLUC 160 03/31/2022 1359     Last Microalb/Cr ratio:   Lab Results   Component Value Date/Time    URCREAT 34.30 03/31/2022 1400    MICROALBUR 4.8 03/31/2022 1400    MALBCRT 140 (H) 03/31/2022 1400     Last A1c:   Lab Results   Component Value Date/Time    HBA1C 7.4 (A) 07/14/2021 0853    AVGLUC 157 04/02/2021 1524     Last Microalb/Cr ratio:   Lab Results   Component Value Date/Time    URCREAT 65.71 01/20/2021 0931    MICROALBUR 5.4 01/20/2021 0931    MALBCRT 82 (H) 01/20/2021 0931     Fasting sugars: 130s, 215-220 postprandial  Last diabetic foot exam: 8 mo ago  Last retinal eye exam: due, referred  ACEi/ARB: contraindicated due to orthostatic hypotension, on Farxyga  Statin: intolerant   Aspirin: no, on Eliquis  Concomitant HTN: no             Past Medical History:   Diagnosis Date   • 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   • Agent orange exposure    • Anesthesia     resistant to pain meds   • Cancer (HCC)     polycythemia vera   • Diabetes (HCC)     insulin and oral meds   • Family history of punctured lung 2002    left lung   • Heart attack (HCC) 03/2017   • Hemorrhagic disorder (HCC)     on blood thinners   • High cholesterol    • Ischemic cardiomyopathy    • Kidney stones     hx of kidney stones   • Lupus (HCC) 11/15/2019   •  Orthostatic hypotension 3/29/2018   • Pain     headache pain   • Polycythemia vera(238.4)    • Stroke (HCC) 2016    r/t afib; eye issues resolved afterward   • Urinary bladder disorder     enlarged prostate, weak stream, difficulty urinating   • Vertigo        Social History     Tobacco Use   • Smoking status: Never Smoker   • Smokeless tobacco: Never Used   Vaping Use   • Vaping Use: Never used   Substance Use Topics   • Alcohol use: No   • Drug use: No       Current Outpatient Medications Ordered in Epic   Medication Sig Dispense Refill   • sucralfate (CARAFATE) 1 GM Tab Take 1 g by mouth 4 times a day.     • dapagliflozin propanediol (FARXIGA) 10 MG Tab Take 5 mg by mouth every day.     • Insulin Degludec (TRESIBA) 100 UNIT/ML Solution Inject 8 Units under the skin every day.     • tamsulosin (FLOMAX) 0.4 MG capsule Take 1 Capsule by mouth every day. 90 Capsule 1   • glucose blood strip 1 Strip by Other route as needed. 100 Strip 1   • finasteride (PROSCAR) 5 MG Tab Take 1 Tablet by mouth every day. 90 Tablet 1   • apixaban (ELIQUIS) 5mg Tab Take 1 Tablet by mouth 2 times a day. 180 Tablet 3   • nitroglycerin (NITROSTAT) 0.4 MG SL Tab Place 1 Tablet under the tongue as needed for Chest Pain (or hypertension >180/110). 25 Tablet 3   • hydroxyurea (HYDREA) 500 MG Cap Take 500 mg by mouth 2 Times a Day.       No current [facility]-ordered facility-administered medications on file.     Health Maintenance: Deferred to next    Review of Systems   Constitutional: Negative for chills and fever.   HENT: Negative for sore throat.    Respiratory: Negative for cough and shortness of breath.    Cardiovascular: Negative for chest pain.   Gastrointestinal: Negative for nausea and vomiting.   Genitourinary: Negative for hematuria.   Neurological: Positive for dizziness (Improved significantly). Negative for speech change and focal weakness.   Psychiatric/Behavioral: Negative for depression.     Objective:     Exam:  /68 (BP  "Location: Right arm, Patient Position: Sitting, BP Cuff Size: Adult)   Pulse 93   Temp 36.1 °C (96.9 °F) (Temporal)   Ht 1.854 m (6' 1\")   Wt 79.5 kg (175 lb 4.3 oz)   SpO2 97%   BMI 23.12 kg/m²  Body mass index is 23.12 kg/m².    Physical Exam  Vitals reviewed.   Constitutional:       General: He is not in acute distress.     Appearance: Normal appearance. He is normal weight. He is not ill-appearing or toxic-appearing.   HENT:      Head: Normocephalic and atraumatic.      Nose: Nose normal. No congestion or rhinorrhea.      Mouth/Throat:      Mouth: Mucous membranes are moist.      Pharynx: Oropharynx is clear. No oropharyngeal exudate.   Eyes:      General: No scleral icterus.     Pupils: Pupils are equal, round, and reactive to light.   Cardiovascular:      Rate and Rhythm: Normal rate and regular rhythm.      Pulses: Normal pulses.      Heart sounds: Normal heart sounds.      Comments: Distant heart sounds  Pulmonary:      Effort: Pulmonary effort is normal. No respiratory distress.      Breath sounds: Normal breath sounds. No wheezing or rales.   Skin:     General: Skin is warm and dry.   Neurological:      General: No focal deficit present.      Mental Status: He is alert and oriented to person, place, and time.   Psychiatric:         Attention and Perception: He is attentive.         Mood and Affect: Mood and affect normal.         Thought Content: Thought content normal.       Labs: I have reviewed CMP, PT/INR from 6/9/2022 from [facility].    Assessment & Plan:   Francesco  is a pleasant 71 y.o. male with the following -     Problem List Items Addressed This Visit     Chronic systolic heart failure (HCC) (Chronic)     Reduced ejection fraction, 20%.   Follows closely with [facility].  We will follow-up in 2 to 3 weeks after completing blood work to discuss further plan.  Unable to access results of right heart cath.  Stress echo results as above.           Essential hypertension    Relevant Orders    Comp " Metabolic Panel    MAGNESIUM    TSH WITH REFLEX TO FT4    Hypercoagulable state (HCC)    Relevant Orders    Prothrombin Time    Ischemic cardiomyopathy    Relevant Orders    TROPONIN    Thrombocytosis    Relevant Orders    CBC WITH DIFFERENTIAL    Type 2 diabetes mellitus without complication, with long-term current use of insulin (HCC)     Suboptimally controlled, continue Tresiba and Farxiga.  Pending referral to retinal screening, A1c due soon.  Not on statin due to intolerance, not on aspirin as he is on Eliquis.           Relevant Orders    HEMOGLOBIN A1C        Return in about 6 weeks (around 7/26/2022), or if symptoms worsen or fail to improve.    Please note that this dictation was created using voice recognition software. I have made every reasonable attempt to correct obvious errors, but I expect that there are errors of grammar and possibly content that I did not discover before finalizing the note.

## 2022-06-14 NOTE — ASSESSMENT & PLAN NOTE
Reduced ejection fraction, 20%.   Follows closely with [facility].  We will follow-up in 2 to 3 weeks after completing blood work to discuss further plan.  Unable to access results of right heart cath.  Stress echo results as above.

## 2022-06-14 NOTE — ASSESSMENT & PLAN NOTE
Suboptimally controlled, continue Tresiba and Farxiga.  Pending referral to retinal screening, A1c due soon.  Not on statin due to intolerance, not on aspirin as he is on Eliquis.   no chest pain, no cough, and no shortness of breath.

## 2022-06-23 ENCOUNTER — HOSPITAL ENCOUNTER (OUTPATIENT)
Dept: LAB | Facility: MEDICAL CENTER | Age: 72
End: 2022-06-23
Attending: INTERNAL MEDICINE
Payer: MEDICARE

## 2022-06-23 DIAGNOSIS — Z79.4 TYPE 2 DIABETES MELLITUS WITHOUT COMPLICATION, WITH LONG-TERM CURRENT USE OF INSULIN (HCC): ICD-10-CM

## 2022-06-23 DIAGNOSIS — E11.9 TYPE 2 DIABETES MELLITUS WITHOUT COMPLICATION, WITH LONG-TERM CURRENT USE OF INSULIN (HCC): ICD-10-CM

## 2022-06-23 DIAGNOSIS — D75.839 THROMBOCYTOSIS: ICD-10-CM

## 2022-06-23 DIAGNOSIS — I25.5 ISCHEMIC CARDIOMYOPATHY: ICD-10-CM

## 2022-06-23 DIAGNOSIS — I10 ESSENTIAL HYPERTENSION: ICD-10-CM

## 2022-06-23 DIAGNOSIS — D68.59 HYPERCOAGULABLE STATE (HCC): ICD-10-CM

## 2022-06-23 LAB
ALBUMIN SERPL BCP-MCNC: 3.7 G/DL (ref 3.2–4.9)
ALBUMIN/GLOB SERPL: 0.8 G/DL
ALP SERPL-CCNC: 63 U/L (ref 30–99)
ALT SERPL-CCNC: 14 U/L (ref 2–50)
ANION GAP SERPL CALC-SCNC: 14 MMOL/L (ref 7–16)
ANISOCYTOSIS BLD QL SMEAR: ABNORMAL
AST SERPL-CCNC: 14 U/L (ref 12–45)
BASOPHILS # BLD AUTO: 0.9 % (ref 0–1.8)
BASOPHILS # BLD: 0.04 K/UL (ref 0–0.12)
BILIRUB SERPL-MCNC: 0.6 MG/DL (ref 0.1–1.5)
BUN SERPL-MCNC: 20 MG/DL (ref 8–22)
CALCIUM SERPL-MCNC: 9.5 MG/DL (ref 8.4–10.2)
CHLORIDE SERPL-SCNC: 101 MMOL/L (ref 96–112)
CO2 SERPL-SCNC: 22 MMOL/L (ref 20–33)
CREAT SERPL-MCNC: 1.07 MG/DL (ref 0.5–1.4)
EOSINOPHIL # BLD AUTO: 0.07 K/UL (ref 0–0.51)
EOSINOPHIL NFR BLD: 1.6 % (ref 0–6.9)
ERYTHROCYTE [DISTWIDTH] IN BLOOD BY AUTOMATED COUNT: 67.7 FL (ref 35.9–50)
EST. AVERAGE GLUCOSE BLD GHB EST-MCNC: 183 MG/DL
FASTING STATUS PATIENT QL REPORTED: NORMAL
GFR SERPLBLD CREATININE-BSD FMLA CKD-EPI: 75 ML/MIN/1.73 M 2
GLOBULIN SER CALC-MCNC: 4.8 G/DL (ref 1.9–3.5)
GLUCOSE SERPL-MCNC: 158 MG/DL (ref 65–99)
HBA1C MFR BLD: 8 % (ref 4–5.6)
HCT VFR BLD AUTO: 49.3 % (ref 42–52)
HGB BLD-MCNC: 16.6 G/DL (ref 14–18)
IMM GRANULOCYTES # BLD AUTO: 0.03 K/UL (ref 0–0.11)
IMM GRANULOCYTES NFR BLD AUTO: 0.7 % (ref 0–0.9)
INR PPP: 1.41 (ref 0.87–1.13)
LYMPHOCYTES # BLD AUTO: 0.49 K/UL (ref 1–4.8)
LYMPHOCYTES NFR BLD: 11.2 % (ref 22–41)
MACROCYTES BLD QL SMEAR: ABNORMAL
MAGNESIUM SERPL-MCNC: 2.1 MG/DL (ref 1.5–2.5)
MCH RBC QN AUTO: 36.2 PG (ref 27–33)
MCHC RBC AUTO-ENTMCNC: 33.7 G/DL (ref 33.7–35.3)
MCV RBC AUTO: 107.4 FL (ref 81.4–97.8)
MONOCYTES # BLD AUTO: 0.34 K/UL (ref 0–0.85)
MONOCYTES NFR BLD AUTO: 7.8 % (ref 0–13.4)
NEUTROPHILS # BLD AUTO: 3.4 K/UL (ref 1.82–7.42)
NEUTROPHILS NFR BLD: 77.8 % (ref 44–72)
NRBC # BLD AUTO: 0 K/UL
NRBC BLD-RTO: 0 /100 WBC
PLATELET # BLD AUTO: 202 K/UL (ref 164–446)
PMV BLD AUTO: 10.8 FL (ref 9–12.9)
POTASSIUM SERPL-SCNC: 3.8 MMOL/L (ref 3.6–5.5)
PROT SERPL-MCNC: 8.5 G/DL (ref 6–8.2)
PROTHROMBIN TIME: 16.2 SEC (ref 12–14.6)
RBC # BLD AUTO: 4.59 M/UL (ref 4.7–6.1)
SODIUM SERPL-SCNC: 137 MMOL/L (ref 135–145)
TROPONIN T SERPL-MCNC: 14 NG/L (ref 6–19)
TSH SERPL DL<=0.005 MIU/L-ACNC: 1.58 UIU/ML (ref 0.38–5.33)
WBC # BLD AUTO: 4.4 K/UL (ref 4.8–10.8)

## 2022-06-23 PROCEDURE — 85025 COMPLETE CBC W/AUTO DIFF WBC: CPT

## 2022-06-23 PROCEDURE — 36415 COLL VENOUS BLD VENIPUNCTURE: CPT

## 2022-06-23 PROCEDURE — 84484 ASSAY OF TROPONIN QUANT: CPT

## 2022-06-23 PROCEDURE — 84443 ASSAY THYROID STIM HORMONE: CPT

## 2022-06-23 PROCEDURE — 80053 COMPREHEN METABOLIC PANEL: CPT

## 2022-06-23 PROCEDURE — 83036 HEMOGLOBIN GLYCOSYLATED A1C: CPT | Mod: GA

## 2022-06-23 PROCEDURE — 83735 ASSAY OF MAGNESIUM: CPT

## 2022-06-23 PROCEDURE — 85610 PROTHROMBIN TIME: CPT

## 2022-06-27 ENCOUNTER — OFFICE VISIT (OUTPATIENT)
Dept: MEDICAL GROUP | Facility: MEDICAL CENTER | Age: 72
End: 2022-06-27
Payer: MEDICARE

## 2022-06-27 ENCOUNTER — HOSPITAL ENCOUNTER (OUTPATIENT)
Facility: MEDICAL CENTER | Age: 72
End: 2022-06-27
Attending: INTERNAL MEDICINE
Payer: MEDICARE

## 2022-06-27 VITALS
DIASTOLIC BLOOD PRESSURE: 64 MMHG | OXYGEN SATURATION: 97 % | SYSTOLIC BLOOD PRESSURE: 106 MMHG | BODY MASS INDEX: 22.5 KG/M2 | HEART RATE: 103 BPM | WEIGHT: 169.75 LBS | HEIGHT: 73 IN | TEMPERATURE: 96.8 F

## 2022-06-27 DIAGNOSIS — E11.9 TYPE 2 DIABETES MELLITUS WITHOUT COMPLICATION, WITH LONG-TERM CURRENT USE OF INSULIN (HCC): ICD-10-CM

## 2022-06-27 DIAGNOSIS — Z79.4 TYPE 2 DIABETES MELLITUS WITHOUT COMPLICATION, WITH LONG-TERM CURRENT USE OF INSULIN (HCC): ICD-10-CM

## 2022-06-27 DIAGNOSIS — N30.01 ACUTE CYSTITIS WITH HEMATURIA: ICD-10-CM

## 2022-06-27 LAB
APPEARANCE UR: NORMAL
BILIRUB UR STRIP-MCNC: NORMAL MG/DL
COLOR UR AUTO: NORMAL
GLUCOSE UR STRIP.AUTO-MCNC: 500 MG/DL
KETONES UR STRIP.AUTO-MCNC: NORMAL MG/DL
LEUKOCYTE ESTERASE UR QL STRIP.AUTO: NORMAL
NITRITE UR QL STRIP.AUTO: POSITIVE
PH UR STRIP.AUTO: 5 [PH] (ref 5–8)
PROT UR QL STRIP: 100 MG/DL
RBC UR QL AUTO: NORMAL
SP GR UR STRIP.AUTO: 1.01
UROBILINOGEN UR STRIP-MCNC: 2 MG/DL

## 2022-06-27 PROCEDURE — 87086 URINE CULTURE/COLONY COUNT: CPT

## 2022-06-27 PROCEDURE — 99214 OFFICE O/P EST MOD 30 MIN: CPT | Performed by: INTERNAL MEDICINE

## 2022-06-27 PROCEDURE — 81002 URINALYSIS NONAUTO W/O SCOPE: CPT | Performed by: INTERNAL MEDICINE

## 2022-06-27 RX ORDER — CIPROFLOXACIN 500 MG/1
500 TABLET, FILM COATED ORAL 2 TIMES DAILY
Qty: 14 TABLET | Refills: 0 | Status: SHIPPED | OUTPATIENT
Start: 2022-06-27 | End: 2022-07-04

## 2022-06-27 ASSESSMENT — ENCOUNTER SYMPTOMS
FEVER: 0
SORE THROAT: 0
CHILLS: 0
NAUSEA: 0
FLANK PAIN: 0
DIZZINESS: 1
FOCAL WEAKNESS: 0
COUGH: 0
DEPRESSION: 0
SPEECH CHANGE: 0
SHORTNESS OF BREATH: 0
VOMITING: 0

## 2022-06-27 ASSESSMENT — FIBROSIS 4 INDEX: FIB4 SCORE: 1.28

## 2022-06-27 NOTE — ASSESSMENT & PLAN NOTE
Increase Tresiba to 10 units daily.  Hold Farxiga until UTI resolves.  Renewed referral to Dr. Mckeon, referred to diabetic education.

## 2022-06-27 NOTE — PROGRESS NOTES
Subjective:     Chief Complaint   Patient presents with   • UTI     Recurring      Diagnoses of Acute cystitis with hematuria and Type 2 diabetes mellitus without complication, with long-term current use of insulin (Formerly [facility]) were pertinent to this visit.    HPI: Francesco is a pleasant 71 y.o. male who presents today for acute visit    Problem   Acute Cystitis With Hematuria    This is an ongoing problem, new to me, patient reports, that his symptoms started with burning sensation while urinating almost 2 weeks ago now.  Associated symptoms: Frequency, nausea, vomiting, no fever, chills, abdominal pain or back pain.  History of ESBL.    He was admitted to the hospital in the end of May and was treated with a course  Augmentin for urinary tract infection and sinusitis.  POCT UA in clinic positive for nitrates, leukocyte esterase, blood.        Type 2 Diabetes Mellitus Without Complication, With Long-Term Current Use of Insulin (Hcc)    This is a chronic condition uncontrolled, managed by endocrinology Dr. Mckeon.  His current regimen includes Tresiba 10  units daily, Farxiga 5 mg (he will hold for 1 week)    Lab Results   Component Value Date/Time    HBA1C 8.0 (H) 06/23/2022 12:55 PM        Last A1c:   Lab Results   Component Value Date/Time    HBA1C 7.2 (H) 03/31/2022 1359    AVGLUC 160 03/31/2022 1359     Last Microalb/Cr ratio:   Lab Results   Component Value Date/Time    URCREAT 34.30 03/31/2022 1400    MICROALBUR 4.8 03/31/2022 1400    MALBCRT 140 (H) 03/31/2022 1400     Last A1c:   Lab Results   Component Value Date/Time    HBA1C 7.4 (A) 07/14/2021 0853    AVGLUC 157 04/02/2021 1524     Last Microalb/Cr ratio:   Lab Results   Component Value Date/Time    URCREAT 65.71 01/20/2021 0931    MICROALBUR 5.4 01/20/2021 0931    MALBCRT 82 (H) 01/20/2021 0931     Fasting sugars: 130s, 215-220 postprandial  Last diabetic foot exam: 8 mo ago  Last retinal eye exam: due, referred  ACEi/ARB: contraindicated due to orthostatic  hypotension, on Farxyga   Statin: intolerant   Aspirin: no, on Eliquis  Concomitant HTN: no           Past Medical History:   Diagnosis Date   • 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   • Agent orange exposure    • Anesthesia     resistant to pain meds   • Cancer (HCC)     polycythemia vera   • Diabetes ([facility])     insulin and oral meds   • Family history of punctured lung 2002    left lung   • Heart attack (HCC) 03/2017   • Hemorrhagic disorder (HCC)     on blood thinners   • High cholesterol    • Ischemic cardiomyopathy    • Kidney stones     hx of kidney stones   • Lupus (HCC) 11/15/2019   • Orthostatic hypotension 3/29/2018   • Pain     headache pain   • Polycythemia vera(238.4)    • Stroke ([facility]) 2016    r/t afib; eye issues resolved afterward   • Urinary bladder disorder     enlarged prostate, weak stream, difficulty urinating   • Vertigo        Current Outpatient Medications Ordered in Epic   Medication Sig Dispense Refill   • ciprofloxacin (CIPRO) 500 MG Tab Take 1 Tablet by mouth 2 times a day for 7 days. 14 Tablet 0   • sucralfate (CARAFATE) 1 GM Tab Take 1 g by mouth 4 times a day.     • dapagliflozin propanediol (FARXIGA) 10 MG Tab Take 5 mg by mouth every day.     • Insulin Degludec (TRESIBA) 100 UNIT/ML Solution Inject 8 Units under the skin every day.     • tamsulosin (FLOMAX) 0.4 MG capsule Take 1 Capsule by mouth every day. 90 Capsule 1   • glucose blood strip 1 Strip by Other route as needed. 100 Strip 1   • finasteride (PROSCAR) 5 MG Tab Take 1 Tablet by mouth every day. 90 Tablet 1   • apixaban (ELIQUIS) 5mg Tab Take 1 Tablet by mouth 2 times a day. 180 Tablet 3   • nitroglycerin (NITROSTAT) 0.4 MG SL Tab Place 1 Tablet under the tongue as needed for Chest Pain (or hypertension >180/110). 25 Tablet 3   • hydroxyurea (HYDREA) 500 MG Cap Take 500 mg by mouth 2 Times a Day.       No current [facility]-ordered facility-administered medications on file.       Health Maintenance: Deferred to next  "visit.    Review of Systems   Constitutional: Positive for malaise/fatigue. Negative for chills and fever.   HENT: Negative for sore throat.    Respiratory: Negative for cough and shortness of breath.    Cardiovascular: Negative for chest pain.   Gastrointestinal: Negative for nausea and vomiting.   Genitourinary: Positive for dysuria, frequency and urgency. Negative for flank pain.   Neurological: Positive for dizziness (Improved significantly). Negative for speech change and focal weakness.   Psychiatric/Behavioral: Negative for depression.     Objective:     Exam:  /64 (BP Location: Right arm, Patient Position: Sitting, BP Cuff Size: Adult)   Pulse (!) 103   Temp 36 °C (96.8 °F) (Temporal)   Ht 1.854 m (6' 1\")   Wt 77 kg (169 lb 12.1 oz)   SpO2 97%   BMI 22.40 kg/m²  Body mass index is 22.4 kg/m².    Physical Exam  Vitals reviewed.   Constitutional:       General: He is not in acute distress.     Appearance: Normal appearance. He is normal weight. He is not ill-appearing or toxic-appearing.   HENT:      Head: Normocephalic and atraumatic.      Nose: Nose normal. No congestion or rhinorrhea.      Mouth/Throat:      Mouth: Mucous membranes are moist.      Pharynx: Oropharynx is clear. No oropharyngeal exudate.   Eyes:      General: No scleral icterus.     Pupils: Pupils are equal, round, and reactive to light.   Cardiovascular:      Rate and Rhythm: Normal rate and regular rhythm.      Pulses: Normal pulses.      Heart sounds: Normal heart sounds.      Comments: Distant heart sounds  Pulmonary:      Effort: Pulmonary effort is normal. No respiratory distress.      Breath sounds: Normal breath sounds. No wheezing or rales.   Abdominal:      General: Abdomen is flat. There is no distension.      Tenderness: There is no right CVA tenderness or left CVA tenderness.   Skin:     General: Skin is warm and dry.   Neurological:      General: No focal deficit present.      Mental Status: He is alert and oriented " to person, place, and time.   Psychiatric:         Attention and Perception: He is attentive.         Mood and Affect: Mood and affect normal.         Thought Content: Thought content normal.       Labs: POCT UA, urine discussed results of CMP, CBC, A1c, TSH from 6/23/2022    Assessment & Plan:   Francesco  is a pleasant 71 y.o. male with the following -     Problem List Items Addressed This Visit     Acute cystitis with hematuria     This is a new problem. patient has experienced the symptoms for almost 2 weeks now.  POCT UA in clinic positive for ketones, blood, nitrates and leukocyte esterase.  Will treat empirically with ciprofloxacin, send urine culture.  Close follow-up in 3 days.  Patient was extensively counseled on signs and symptoms of sepsis and he will seek care immediately in emergency room if his symptoms worsen or fail to improve within 24 hours since initiation of antibiotics.   Obtain ultrasound of the kidneys due to recurrent UTIs.  Patient will hold Farxiga for 7 days.    Extensively counseled patient on signs and symptoms of sepsis and emergency room precautions.           Relevant Medications    ciprofloxacin (CIPRO) 500 MG Tab    Other Relevant Orders    POCT Urinalysis (Completed)    US-RENAL    URINE CULTURE(NEW)    Type 2 diabetes mellitus without complication, with long-term current use of insulin (HCC)     Increase Tresiba to 10 units daily.  Hold Farxiga until UTI resolves.  Renewed referral to Dr. Mckeon, referred to diabetic education.           Relevant Orders    Referral to Endocrinology    Referral to Diabetic Education        Return in about 3 days (around 6/30/2022), or if symptoms worsen or fail to improve.    Please note that this dictation was created using voice recognition software. I have made every reasonable attempt to correct obvious errors, but I expect that there are errors of grammar and possibly content that I did not discover before finalizing the note.

## 2022-06-27 NOTE — ASSESSMENT & PLAN NOTE
This is a new problem. patient has experienced the symptoms for almost 2 weeks now.  POCT UA in clinic positive for ketones, blood, nitrates and leukocyte esterase.  Will treat empirically with ciprofloxacin, send urine culture.  Close follow-up in 3 days.  Patient was extensively counseled on signs and symptoms of sepsis and he will seek care immediately in emergency room if his symptoms worsen or fail to improve within 24 hours since initiation of antibiotics.   Obtain ultrasound of the kidneys due to recurrent UTIs.  Patient will hold Farxiga for 7 days.    Extensively counseled patient on signs and symptoms of sepsis and emergency room precautions.

## 2022-06-28 ENCOUNTER — PATIENT MESSAGE (OUTPATIENT)
Dept: MEDICAL GROUP | Facility: MEDICAL CENTER | Age: 72
End: 2022-06-28
Payer: MEDICARE

## 2022-06-30 ENCOUNTER — OFFICE VISIT (OUTPATIENT)
Dept: MEDICAL GROUP | Facility: MEDICAL CENTER | Age: 72
End: 2022-06-30
Payer: MEDICARE

## 2022-06-30 VITALS
BODY MASS INDEX: 22.67 KG/M2 | WEIGHT: 171.08 LBS | SYSTOLIC BLOOD PRESSURE: 102 MMHG | DIASTOLIC BLOOD PRESSURE: 68 MMHG | HEIGHT: 73 IN | OXYGEN SATURATION: 97 % | HEART RATE: 97 BPM | TEMPERATURE: 97.5 F

## 2022-06-30 DIAGNOSIS — N30.01 ACUTE CYSTITIS WITH HEMATURIA: ICD-10-CM

## 2022-06-30 DIAGNOSIS — R30.9 PAIN WITH URINATION: ICD-10-CM

## 2022-06-30 LAB
BACTERIA UR CULT: NORMAL
SIGNIFICANT IND 70042: NORMAL
SITE SITE: NORMAL
SOURCE SOURCE: NORMAL

## 2022-06-30 PROCEDURE — 99214 OFFICE O/P EST MOD 30 MIN: CPT | Performed by: INTERNAL MEDICINE

## 2022-06-30 ASSESSMENT — ENCOUNTER SYMPTOMS
FEVER: 0
VOMITING: 0
NAUSEA: 0
SORE THROAT: 0
SPEECH CHANGE: 0
SHORTNESS OF BREATH: 0
FOCAL WEAKNESS: 0
COUGH: 0
CHILLS: 0
DIZZINESS: 1
FLANK PAIN: 0
DEPRESSION: 0

## 2022-06-30 ASSESSMENT — FIBROSIS 4 INDEX: FIB4 SCORE: 1.28

## 2022-06-30 NOTE — PROGRESS NOTES
Subjective:     Chief Complaint   Patient presents with   • Painful Urination     Diagnoses of Pain with urination and Acute cystitis with hematuria were pertinent to this visit.    HPI: Francesco is a pleasant 71 y.o. male who presents today for follow-up on conditions listed below.    Problem   Pain With Urination    This is an ongoing problem for over 3 weeks now.  Antibiotics course did not improve his symptoms.  He reports extreme pain at the tip of the penis, when he starts urinating.  All of his symptoms started approximately 2 to 3 days after he was discharged from [facility] on 6/9/2022.   Patient reports extreme pain on urination, that is only elevated by narcotics: Oxycodone, Vicodin and tramadol.  Those medications were prescribed to him a long time ago, while he was receiving care at [facility].    I have strongly advised against those medications and directed patient to emergency room for further evaluation.  Patient was offered an ambulance ride, however he declines and he feels comfortable ambulating to the emergency room on his own.     Acute Cystitis With Hematuria    Patient was seen on Monday for symptoms of UTI.  His POCT UA in clinic positive for nitrates, leukocyte esterase, blood.   Urine culture grew normal jeanine.  Patient reports extreme pain in his penis while urinating.   He tells me, that he is taking tramadol, oxycodone and other pain medications, that he has at home from the time, when he was receiving care at [facility].  He is taking ciprofloxacin as prescribed and reports that his symptoms did not improve.             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) 11/15/2019   • Orthostatic hypotension 3/29/2018   • Pain     headache pain   • Polycythemia vera(238.4)    • Stroke ([facility]) 2016    r/t afib; eye issues resolved afterward   • Urinary bladder disorder     enlarged prostate, weak stream, difficulty urinating   • Vertigo        Current Outpatient Medications Ordered in Epic   Medication Sig Dispense Refill   • ciprofloxacin (CIPRO) 500 MG Tab Take 1 Tablet by mouth 2 times a day for 7 days. 14 Tablet 0   • sucralfate (CARAFATE) 1 GM Tab Take 1 g by mouth 4 times a day.     • dapagliflozin propanediol (FARXIGA) 10 MG Tab Take 5 mg by mouth every day.     • Insulin Degludec (TRESIBA) 100 UNIT/ML Solution Inject 8 Units under the skin every day.     • tamsulosin (FLOMAX) 0.4 MG capsule Take 1 Capsule by mouth every day. 90 Capsule 1   • glucose blood strip 1 Strip by Other route as needed. 100 Strip 1   • finasteride (PROSCAR) 5 MG Tab Take 1 Tablet by mouth every day. 90 Tablet 1   • apixaban (ELIQUIS) 5mg Tab Take 1 Tablet by mouth 2 times a day. 180 Tablet 3   • nitroglycerin (NITROSTAT) 0.4 MG SL Tab Place 1 Tablet under the tongue as needed for Chest Pain (or hypertension >180/110). 25 Tablet 3   • hydroxyurea (HYDREA) 500 MG Cap Take 500 mg by mouth 2 Times a Day.       No current [facility]-ordered facility-administered medications on file.     Health Maintenance: Deferred to next visit.    Review of Systems   Constitutional: Positive for malaise/fatigue. Negative for chills and fever.   HENT: Negative for sore throat.    Respiratory: Negative for cough and shortness of breath.    Cardiovascular: Negative for chest pain.   Gastrointestinal: Negative for nausea and vomiting.   Genitourinary: Positive for dysuria, frequency and urgency. Negative for flank pain and hematuria.   Neurological: Positive for dizziness (Improved significantly). Negative for speech change and focal  "weakness.   Psychiatric/Behavioral: Negative for depression.     Objective:     Exam:  /68 (BP Location: Right arm, Patient Position: Sitting, BP Cuff Size: Adult)   Pulse 97   Temp 36.4 °C (97.5 °F) (Temporal)   Ht 1.854 m (6' 1\")   Wt 77.6 kg (171 lb 1.2 oz)   SpO2 97%   BMI 22.57 kg/m²  Body mass index is 22.57 kg/m².    Physical Exam  Vitals reviewed.   Constitutional:       General: He is not in acute distress.     Appearance: Normal appearance. He is normal weight. He is not ill-appearing or toxic-appearing.   HENT:      Head: Normocephalic and atraumatic.      Nose: Nose normal. No congestion or rhinorrhea.      Mouth/Throat:      Mouth: Mucous membranes are moist.      Pharynx: Oropharynx is clear. No oropharyngeal exudate.   Eyes:      General: No scleral icterus.     Pupils: Pupils are equal, round, and reactive to light.   Cardiovascular:      Rate and Rhythm: Normal rate and regular rhythm.      Pulses: Normal pulses.      Heart sounds: Normal heart sounds.      Comments: Distant heart sounds  Pulmonary:      Effort: Pulmonary effort is normal. No respiratory distress.      Breath sounds: Normal breath sounds. No wheezing or rales.   Abdominal:      General: Abdomen is flat. There is no distension.      Tenderness: There is abdominal tenderness (generalized). There is no right CVA tenderness, left CVA tenderness, guarding or rebound.   Skin:     General: Skin is warm and dry.   Neurological:      General: No focal deficit present.      Mental Status: He is alert and oriented to person, place, and time.   Psychiatric:         Attention and Perception: He is attentive.         Mood and Affect: Mood and affect normal.         Thought Content: Thought content normal.       Labs: Discussed results of urine culture from 6/27/2020    Assessment & Plan:   Francesco  is a pleasant 71 y.o. male with the following -     Problem List Items Addressed This Visit     Pain with urination (Chronic)     This is " an ongoing problem and in my medical opinion, it requires urgent evaluation emergency room.   Patient declined ambulance ride and reassured me, he will present to emergency room for further evaluation.  He verbalizes understanding, that this condition might be life-threatening.           Relevant Orders    Referral to Urology    Acute cystitis with hematuria     Patient was evaluated on Monday for symptoms of UTI, POCT UA in clinic was positive for nitrates, leukocyte esterase and blood.  Started course of ciprofloxacin 500 mg tablet daily on 6/27/2022.  Patient reports no improvement of his symptoms.  He indeed reports extreme pain with urination, that he has to take narcotics for.  Have advised patient, that his symptoms are concerning, he might be experiencing urinary retention or obstructing kidney stones and that he requires evaluation in the emergency room as soon as possible.  I have offered patient ambulance ride, however he feels comfortable ambulating to emergency room at this time.  I have also placed an urgent referral to urology specialist for him.           Relevant Orders    Referral to Urology        Return if symptoms worsen or fail to improve.    Please note that this dictation was created using voice recognition software. I have made every reasonable attempt to correct obvious errors, but I expect that there are errors of grammar and possibly content that I did not discover before finalizing the note.

## 2022-06-30 NOTE — ASSESSMENT & PLAN NOTE
Patient was evaluated on Monday for symptoms of UTI, POCT UA in clinic was positive for nitrates, leukocyte esterase and blood.  Started course of ciprofloxacin 500 mg tablet daily on 6/27/2022.  Patient reports no improvement of his symptoms.  He indeed reports extreme pain with urination, that he has to take narcotics for.  Have advised patient, that his symptoms are concerning, he might be experiencing urinary retention or obstructing kidney stones and that he requires evaluation in the emergency room as soon as possible.  I have offered patient ambulance ride, however he feels comfortable ambulating to emergency room at this time.  I have also placed an urgent referral to urology specialist for him.

## 2022-06-30 NOTE — ASSESSMENT & PLAN NOTE
This is an ongoing problem and in my medical opinion, it requires urgent evaluation emergency room.   Patient declined ambulance ride and reassured me, he will present to emergency room for further evaluation.  He verbalizes understanding, that this condition might be life-threatening.

## 2022-07-06 ENCOUNTER — APPOINTMENT (OUTPATIENT)
Dept: CARDIOLOGY | Facility: MEDICAL CENTER | Age: 72
End: 2022-07-06
Attending: FAMILY MEDICINE
Payer: MEDICARE

## 2022-07-06 ENCOUNTER — APPOINTMENT (OUTPATIENT)
Dept: RADIOLOGY | Facility: MEDICAL CENTER | Age: 72
End: 2022-07-06
Attending: EMERGENCY MEDICINE
Payer: MEDICARE

## 2022-07-06 ENCOUNTER — HOSPITAL ENCOUNTER (OUTPATIENT)
Facility: MEDICAL CENTER | Age: 72
End: 2022-07-07
Attending: EMERGENCY MEDICINE | Admitting: FAMILY MEDICINE
Payer: MEDICARE

## 2022-07-06 DIAGNOSIS — N30.90 CYSTITIS: ICD-10-CM

## 2022-07-06 DIAGNOSIS — R41.82 ALTERED MENTAL STATUS, UNSPECIFIED ALTERED MENTAL STATUS TYPE: ICD-10-CM

## 2022-07-06 DIAGNOSIS — R30.9 PAIN WITH URINATION: Chronic | ICD-10-CM

## 2022-07-06 PROBLEM — Z91.199 MEDICALLY NONCOMPLIANT: Status: ACTIVE | Noted: 2022-07-06

## 2022-07-06 PROBLEM — N30.00 ACUTE CYSTITIS: Status: ACTIVE | Noted: 2022-07-06

## 2022-07-06 PROBLEM — G92.9 TOXIC ENCEPHALOPATHY: Status: ACTIVE | Noted: 2022-05-31

## 2022-07-06 LAB
ALBUMIN SERPL BCP-MCNC: 3.5 G/DL (ref 3.2–4.9)
ALBUMIN/GLOB SERPL: 0.7 G/DL
ALP SERPL-CCNC: 69 U/L (ref 30–99)
ALT SERPL-CCNC: 18 U/L (ref 2–50)
ANION GAP SERPL CALC-SCNC: 11 MMOL/L (ref 7–16)
ANISOCYTOSIS BLD QL SMEAR: ABNORMAL
APPEARANCE UR: ABNORMAL
AST SERPL-CCNC: 17 U/L (ref 12–45)
BASOPHILS # BLD AUTO: 0.8 % (ref 0–1.8)
BASOPHILS # BLD: 0.04 K/UL (ref 0–0.12)
BILIRUB SERPL-MCNC: 0.7 MG/DL (ref 0.1–1.5)
BILIRUB UR QL STRIP.AUTO: NEGATIVE
BUN SERPL-MCNC: 18 MG/DL (ref 8–22)
CALCIUM SERPL-MCNC: 9.5 MG/DL (ref 8.4–10.2)
CHLORIDE SERPL-SCNC: 101 MMOL/L (ref 96–112)
CO2 SERPL-SCNC: 24 MMOL/L (ref 20–33)
COLOR UR: YELLOW
COMMENT 1642: NORMAL
CREAT SERPL-MCNC: 0.96 MG/DL (ref 0.5–1.4)
EKG IMPRESSION: NORMAL
EKG IMPRESSION: NORMAL
EOSINOPHIL # BLD AUTO: 0.1 K/UL (ref 0–0.51)
EOSINOPHIL NFR BLD: 2.1 % (ref 0–6.9)
EPI CELLS #/AREA URNS HPF: ABNORMAL /HPF
ERYTHROCYTE [DISTWIDTH] IN BLOOD BY AUTOMATED COUNT: 71.9 FL (ref 35.9–50)
GFR SERPLBLD CREATININE-BSD FMLA CKD-EPI: 85 ML/MIN/1.73 M 2
GLOBULIN SER CALC-MCNC: 4.8 G/DL (ref 1.9–3.5)
GLUCOSE BLD STRIP.AUTO-MCNC: 192 MG/DL (ref 65–99)
GLUCOSE BLD STRIP.AUTO-MCNC: 222 MG/DL (ref 65–99)
GLUCOSE SERPL-MCNC: 190 MG/DL (ref 65–99)
GLUCOSE UR STRIP.AUTO-MCNC: NEGATIVE MG/DL
HCT VFR BLD AUTO: 48.5 % (ref 42–52)
HGB BLD-MCNC: 16.1 G/DL (ref 14–18)
IMM GRANULOCYTES # BLD AUTO: 0.06 K/UL (ref 0–0.11)
IMM GRANULOCYTES NFR BLD AUTO: 1.2 % (ref 0–0.9)
KETONES UR STRIP.AUTO-MCNC: NEGATIVE MG/DL
LACTATE BLD-SCNC: 1.3 MMOL/L (ref 0.5–2)
LEUKOCYTE ESTERASE UR QL STRIP.AUTO: ABNORMAL
LIPASE SERPL-CCNC: 30 U/L (ref 7–58)
LV EJECT FRACT  99904: 25
LV EJECT FRACT MOD 2C 99903: 57.51
LV EJECT FRACT MOD 4C 99902: 19.61
LV EJECT FRACT MOD BP 99901: 34.11
LYMPHOCYTES # BLD AUTO: 0.45 K/UL (ref 1–4.8)
LYMPHOCYTES NFR BLD: 9.3 % (ref 22–41)
MACROCYTES BLD QL SMEAR: ABNORMAL
MAGNESIUM SERPL-MCNC: 2 MG/DL (ref 1.5–2.5)
MCH RBC QN AUTO: 36.3 PG (ref 27–33)
MCHC RBC AUTO-ENTMCNC: 33.2 G/DL (ref 33.7–35.3)
MCV RBC AUTO: 109.2 FL (ref 81.4–97.8)
MICRO URNS: ABNORMAL
MONOCYTES # BLD AUTO: 0.31 K/UL (ref 0–0.85)
MONOCYTES NFR BLD AUTO: 6.4 % (ref 0–13.4)
NEUTROPHILS # BLD AUTO: 3.88 K/UL (ref 1.82–7.42)
NEUTROPHILS NFR BLD: 80.2 % (ref 44–72)
NITRITE UR QL STRIP.AUTO: NEGATIVE
NRBC # BLD AUTO: 0 K/UL
NRBC BLD-RTO: 0 /100 WBC
PH UR STRIP.AUTO: 6 [PH] (ref 5–8)
PLATELET # BLD AUTO: 255 K/UL (ref 164–446)
PLATELET BLD QL SMEAR: NORMAL
PMV BLD AUTO: 10.2 FL (ref 9–12.9)
POLYCHROMASIA BLD QL SMEAR: NORMAL
POTASSIUM SERPL-SCNC: 4.1 MMOL/L (ref 3.6–5.5)
PROT SERPL-MCNC: 8.3 G/DL (ref 6–8.2)
PROT UR QL STRIP: NEGATIVE MG/DL
RBC # BLD AUTO: 4.44 M/UL (ref 4.7–6.1)
RBC # URNS HPF: ABNORMAL /HPF
RBC BLD AUTO: PRESENT
RBC UR QL AUTO: ABNORMAL
SODIUM SERPL-SCNC: 136 MMOL/L (ref 135–145)
SP GR UR STRIP.AUTO: <=1.005
TROPONIN T SERPL-MCNC: 14 NG/L (ref 6–19)
TROPONIN T SERPL-MCNC: 15 NG/L (ref 6–19)
TROPONIN T SERPL-MCNC: 15 NG/L (ref 6–19)
WBC # BLD AUTO: 4.8 K/UL (ref 4.8–10.8)
WBC #/AREA URNS HPF: ABNORMAL /HPF
YEAST #/AREA URNS HPF: ABNORMAL /HPF
YEAST HYPHAE #/AREA URNS HPF: PRESENT /HPF

## 2022-07-06 PROCEDURE — 96372 THER/PROPH/DIAG INJ SC/IM: CPT | Mod: XU

## 2022-07-06 PROCEDURE — 93306 TTE W/DOPPLER COMPLETE: CPT

## 2022-07-06 PROCEDURE — 99285 EMERGENCY DEPT VISIT HI MDM: CPT

## 2022-07-06 PROCEDURE — A9270 NON-COVERED ITEM OR SERVICE: HCPCS | Performed by: FAMILY MEDICINE

## 2022-07-06 PROCEDURE — 96375 TX/PRO/DX INJ NEW DRUG ADDON: CPT | Mod: XU

## 2022-07-06 PROCEDURE — 83036 HEMOGLOBIN GLYCOSYLATED A1C: CPT

## 2022-07-06 PROCEDURE — 74177 CT ABD & PELVIS W/CONTRAST: CPT | Mod: ME

## 2022-07-06 PROCEDURE — 96365 THER/PROPH/DIAG IV INF INIT: CPT | Mod: XU

## 2022-07-06 PROCEDURE — 93005 ELECTROCARDIOGRAM TRACING: CPT | Performed by: EMERGENCY MEDICINE

## 2022-07-06 PROCEDURE — G0378 HOSPITAL OBSERVATION PER HR: HCPCS

## 2022-07-06 PROCEDURE — 87086 URINE CULTURE/COLONY COUNT: CPT

## 2022-07-06 PROCEDURE — 82962 GLUCOSE BLOOD TEST: CPT | Mod: 91

## 2022-07-06 PROCEDURE — 93005 ELECTROCARDIOGRAM TRACING: CPT

## 2022-07-06 PROCEDURE — 96366 THER/PROPH/DIAG IV INF ADDON: CPT

## 2022-07-06 PROCEDURE — 83605 ASSAY OF LACTIC ACID: CPT

## 2022-07-06 PROCEDURE — 83735 ASSAY OF MAGNESIUM: CPT

## 2022-07-06 PROCEDURE — 94760 N-INVAS EAR/PLS OXIMETRY 1: CPT

## 2022-07-06 PROCEDURE — 700105 HCHG RX REV CODE 258: Performed by: FAMILY MEDICINE

## 2022-07-06 PROCEDURE — 93306 TTE W/DOPPLER COMPLETE: CPT | Mod: 26 | Performed by: INTERNAL MEDICINE

## 2022-07-06 PROCEDURE — 84484 ASSAY OF TROPONIN QUANT: CPT | Mod: 91

## 2022-07-06 PROCEDURE — 96367 TX/PROPH/DG ADDL SEQ IV INF: CPT

## 2022-07-06 PROCEDURE — 700102 HCHG RX REV CODE 250 W/ 637 OVERRIDE(OP): Performed by: EMERGENCY MEDICINE

## 2022-07-06 PROCEDURE — 80053 COMPREHEN METABOLIC PANEL: CPT

## 2022-07-06 PROCEDURE — 700105 HCHG RX REV CODE 258: Performed by: EMERGENCY MEDICINE

## 2022-07-06 PROCEDURE — 81001 URINALYSIS AUTO W/SCOPE: CPT

## 2022-07-06 PROCEDURE — 700111 HCHG RX REV CODE 636 W/ 250 OVERRIDE (IP): Performed by: FAMILY MEDICINE

## 2022-07-06 PROCEDURE — 99220 PR INITIAL OBSERVATION CARE,LEVL III: CPT | Performed by: FAMILY MEDICINE

## 2022-07-06 PROCEDURE — 83690 ASSAY OF LIPASE: CPT

## 2022-07-06 PROCEDURE — 700117 HCHG RX CONTRAST REV CODE 255: Performed by: EMERGENCY MEDICINE

## 2022-07-06 PROCEDURE — 36415 COLL VENOUS BLD VENIPUNCTURE: CPT

## 2022-07-06 PROCEDURE — 87040 BLOOD CULTURE FOR BACTERIA: CPT

## 2022-07-06 PROCEDURE — 85025 COMPLETE CBC W/AUTO DIFF WBC: CPT

## 2022-07-06 PROCEDURE — 700102 HCHG RX REV CODE 250 W/ 637 OVERRIDE(OP): Performed by: FAMILY MEDICINE

## 2022-07-06 PROCEDURE — 700111 HCHG RX REV CODE 636 W/ 250 OVERRIDE (IP): Performed by: EMERGENCY MEDICINE

## 2022-07-06 PROCEDURE — A9270 NON-COVERED ITEM OR SERVICE: HCPCS | Performed by: EMERGENCY MEDICINE

## 2022-07-06 RX ORDER — POLYETHYLENE GLYCOL 3350 17 G/17G
1 POWDER, FOR SOLUTION ORAL
Status: DISCONTINUED | OUTPATIENT
Start: 2022-07-06 | End: 2022-07-07 | Stop reason: HOSPADM

## 2022-07-06 RX ORDER — FINASTERIDE 5 MG/1
5 TABLET, FILM COATED ORAL
Status: DISCONTINUED | OUTPATIENT
Start: 2022-07-07 | End: 2022-07-07 | Stop reason: HOSPADM

## 2022-07-06 RX ORDER — OXYCODONE HYDROCHLORIDE 10 MG/1
10 TABLET ORAL
Status: DISCONTINUED | OUTPATIENT
Start: 2022-07-06 | End: 2022-07-07 | Stop reason: HOSPADM

## 2022-07-06 RX ORDER — ASPIRIN 325 MG
325 TABLET ORAL DAILY
Status: DISCONTINUED | OUTPATIENT
Start: 2022-07-06 | End: 2022-07-06

## 2022-07-06 RX ORDER — HYDROXYUREA 500 MG/1
500 CAPSULE ORAL 2 TIMES DAILY
Status: DISCONTINUED | OUTPATIENT
Start: 2022-07-06 | End: 2022-07-07 | Stop reason: HOSPADM

## 2022-07-06 RX ORDER — ASPIRIN 81 MG/1
324 TABLET, CHEWABLE ORAL DAILY
Status: DISCONTINUED | OUTPATIENT
Start: 2022-07-06 | End: 2022-07-06

## 2022-07-06 RX ORDER — TAMSULOSIN HYDROCHLORIDE 0.4 MG/1
0.4 CAPSULE ORAL DAILY
Status: DISCONTINUED | OUTPATIENT
Start: 2022-07-07 | End: 2022-07-07 | Stop reason: HOSPADM

## 2022-07-06 RX ORDER — BISACODYL 10 MG
10 SUPPOSITORY, RECTAL RECTAL
Status: DISCONTINUED | OUTPATIENT
Start: 2022-07-06 | End: 2022-07-07 | Stop reason: HOSPADM

## 2022-07-06 RX ORDER — DEXTROSE MONOHYDRATE 25 G/50ML
25 INJECTION, SOLUTION INTRAVENOUS
Status: DISCONTINUED | OUTPATIENT
Start: 2022-07-06 | End: 2022-07-06

## 2022-07-06 RX ORDER — HYDRALAZINE HYDROCHLORIDE 20 MG/ML
10 INJECTION INTRAMUSCULAR; INTRAVENOUS EVERY 4 HOURS PRN
Status: DISCONTINUED | OUTPATIENT
Start: 2022-07-06 | End: 2022-07-07 | Stop reason: HOSPADM

## 2022-07-06 RX ORDER — ONDANSETRON 2 MG/ML
4 INJECTION INTRAMUSCULAR; INTRAVENOUS EVERY 4 HOURS PRN
Status: DISCONTINUED | OUTPATIENT
Start: 2022-07-06 | End: 2022-07-07 | Stop reason: HOSPADM

## 2022-07-06 RX ORDER — LEVOFLOXACIN 5 MG/ML
750 INJECTION, SOLUTION INTRAVENOUS EVERY 24 HOURS
Status: DISCONTINUED | OUTPATIENT
Start: 2022-07-06 | End: 2022-07-06

## 2022-07-06 RX ORDER — SPIRONOLACTONE 25 MG/1
12.5 TABLET ORAL DAILY
COMMUNITY
End: 2022-07-26

## 2022-07-06 RX ORDER — MORPHINE SULFATE 4 MG/ML
4 INJECTION INTRAVENOUS
Status: DISCONTINUED | OUTPATIENT
Start: 2022-07-06 | End: 2022-07-07 | Stop reason: HOSPADM

## 2022-07-06 RX ORDER — ONDANSETRON 4 MG/1
4 TABLET, ORALLY DISINTEGRATING ORAL EVERY 4 HOURS PRN
Status: DISCONTINUED | OUTPATIENT
Start: 2022-07-06 | End: 2022-07-07 | Stop reason: HOSPADM

## 2022-07-06 RX ORDER — ACETAMINOPHEN 325 MG/1
650 TABLET ORAL EVERY 6 HOURS PRN
Status: DISCONTINUED | OUTPATIENT
Start: 2022-07-06 | End: 2022-07-07 | Stop reason: HOSPADM

## 2022-07-06 RX ORDER — CEFTRIAXONE 2 G/1
2 INJECTION, POWDER, FOR SOLUTION INTRAMUSCULAR; INTRAVENOUS ONCE
Status: DISCONTINUED | OUTPATIENT
Start: 2022-07-06 | End: 2022-07-06

## 2022-07-06 RX ORDER — AMOXICILLIN 250 MG
2 CAPSULE ORAL 2 TIMES DAILY
Status: DISCONTINUED | OUTPATIENT
Start: 2022-07-06 | End: 2022-07-07 | Stop reason: HOSPADM

## 2022-07-06 RX ORDER — CIPROFLOXACIN 500 MG/1
500 TABLET, FILM COATED ORAL 2 TIMES DAILY
Status: ON HOLD | COMMUNITY
End: 2022-07-07

## 2022-07-06 RX ORDER — ASPIRIN 300 MG/1
300 SUPPOSITORY RECTAL DAILY
Status: DISCONTINUED | OUTPATIENT
Start: 2022-07-06 | End: 2022-07-06

## 2022-07-06 RX ORDER — OXYCODONE HYDROCHLORIDE 5 MG/1
5 TABLET ORAL
Status: DISCONTINUED | OUTPATIENT
Start: 2022-07-06 | End: 2022-07-07 | Stop reason: HOSPADM

## 2022-07-06 RX ORDER — NITROGLYCERIN 0.4 MG/1
0.4 TABLET SUBLINGUAL
Status: DISCONTINUED | OUTPATIENT
Start: 2022-07-06 | End: 2022-07-07 | Stop reason: HOSPADM

## 2022-07-06 RX ADMIN — HYDROXYUREA 500 MG: 500 CAPSULE ORAL at 18:10

## 2022-07-06 RX ADMIN — PIPERACILLIN AND TAZOBACTAM 4.5 G: 4; .5 INJECTION, POWDER, FOR SOLUTION INTRAVENOUS at 15:32

## 2022-07-06 RX ADMIN — SENNOSIDES AND DOCUSATE SODIUM 2 TABLET: 50; 8.6 TABLET ORAL at 18:10

## 2022-07-06 RX ADMIN — IOHEXOL 100 ML: 350 INJECTION, SOLUTION INTRAVENOUS at 12:24

## 2022-07-06 RX ADMIN — MORPHINE SULFATE 4 MG: 4 INJECTION INTRAVENOUS at 22:26

## 2022-07-06 RX ADMIN — ASPIRIN 81 MG 324 MG: 81 TABLET ORAL at 11:25

## 2022-07-06 RX ADMIN — PIPERACILLIN SODIUM AND TAZOBACTAM SODIUM 3.38 G: 3; 375 INJECTION, POWDER, FOR SOLUTION INTRAVENOUS at 20:30

## 2022-07-06 RX ADMIN — OXYCODONE HYDROCHLORIDE 10 MG: 10 TABLET ORAL at 20:21

## 2022-07-06 RX ADMIN — APIXABAN 5 MG: 5 TABLET, FILM COATED ORAL at 18:10

## 2022-07-06 RX ADMIN — INSULIN HUMAN 1 UNITS: 100 INJECTION, SOLUTION PARENTERAL at 20:40

## 2022-07-06 ASSESSMENT — COGNITIVE AND FUNCTIONAL STATUS - GENERAL
STANDING UP FROM CHAIR USING ARMS: A LITTLE
SUGGESTED CMS G CODE MODIFIER DAILY ACTIVITY: CJ
MOVING FROM LYING ON BACK TO SITTING ON SIDE OF FLAT BED: A LITTLE
DRESSING REGULAR LOWER BODY CLOTHING: A LITTLE
DAILY ACTIVITIY SCORE: 22
MOVING TO AND FROM BED TO CHAIR: A LITTLE
HELP NEEDED FOR BATHING: A LITTLE
WALKING IN HOSPITAL ROOM: A LITTLE
CLIMB 3 TO 5 STEPS WITH RAILING: A LITTLE
SUGGESTED CMS G CODE MODIFIER MOBILITY: CK
MOBILITY SCORE: 19

## 2022-07-06 ASSESSMENT — CHA2DS2 SCORE
CHF OR LEFT VENTRICULAR DYSFUNCTION: YES
SEX: MALE
VASCULAR DISEASE: YES
DIABETES: YES
CHA2DS2 VASC SCORE: 7
AGE 75 OR GREATER: NO
AGE 65 TO 74: YES
HYPERTENSION: YES
PRIOR STROKE OR TIA OR THROMBOEMBOLISM: YES

## 2022-07-06 ASSESSMENT — LIFESTYLE VARIABLES
ON A TYPICAL DAY WHEN YOU DRINK ALCOHOL HOW MANY DRINKS DO YOU HAVE: 0
HOW MANY TIMES IN THE PAST YEAR HAVE YOU HAD 5 OR MORE DRINKS IN A DAY: 0
CONSUMPTION TOTAL: NEGATIVE
HAVE YOU EVER FELT YOU SHOULD CUT DOWN ON YOUR DRINKING: NO
TOTAL SCORE: 0
EVER HAD A DRINK FIRST THING IN THE MORNING TO STEADY YOUR NERVES TO GET RID OF A HANGOVER: NO
HAVE PEOPLE ANNOYED YOU BY CRITICIZING YOUR DRINKING: NO
TOTAL SCORE: 0
ALCOHOL_USE: NO
EVER FELT BAD OR GUILTY ABOUT YOUR DRINKING: NO
AVERAGE NUMBER OF DAYS PER WEEK YOU HAVE A DRINK CONTAINING ALCOHOL: 0
TOTAL SCORE: 0

## 2022-07-06 ASSESSMENT — PATIENT HEALTH QUESTIONNAIRE - PHQ9
1. LITTLE INTEREST OR PLEASURE IN DOING THINGS: NOT AT ALL
1. LITTLE INTEREST OR PLEASURE IN DOING THINGS: NOT AT ALL
2. FEELING DOWN, DEPRESSED, IRRITABLE, OR HOPELESS: NOT AT ALL
2. FEELING DOWN, DEPRESSED, IRRITABLE, OR HOPELESS: NOT AT ALL
SUM OF ALL RESPONSES TO PHQ9 QUESTIONS 1 AND 2: 0
SUM OF ALL RESPONSES TO PHQ9 QUESTIONS 1 AND 2: 0

## 2022-07-06 ASSESSMENT — ENCOUNTER SYMPTOMS
SHORTNESS OF BREATH: 0
NAUSEA: 1
FLANK PAIN: 1
VOMITING: 0
ABDOMINAL PAIN: 1
FEVER: 0
COUGH: 0
CHILLS: 0

## 2022-07-06 ASSESSMENT — PAIN SCALES - PAIN ASSESSMENT IN ADVANCED DEMENTIA (PAINAD)
FACIALEXPRESSION: SMILING OR INEXPRESSIVE
BREATHING: NORMAL
BODYLANGUAGE: RELAXED

## 2022-07-06 ASSESSMENT — PAIN DESCRIPTION - PAIN TYPE: TYPE: ACUTE PAIN

## 2022-07-06 ASSESSMENT — FIBROSIS 4 INDEX: FIB4 SCORE: 1.28

## 2022-07-06 NOTE — ED PROVIDER NOTES
"ED Provider Note    CHIEF COMPLAINT  Chief Complaint   Patient presents with   • Abdominal Pain       HPI  Francesco Schulte is a 71 y.o. male who presents with a variety of complaints.  The patient is a very poor historian and appears slightly confused and it is very difficult to obtain a coherent history from this patient.  He is perseverating regarding being informed that he needed to come to the hospital within 48 hours on Friday by a doctor called \"MK\".  He perseverates on a prior diagnosis of allergic reaction to a medication called \"ER\".  He has a pan positive review of systems when asked specifically.  He has abdominal pain, dysuria, chest pain, trouble breathing, fevers, weakness.    I did review the patient's medical record, he had a similar presentation about a month ago.  He was diagnosed with toxic metabolic encephalopathy at that time.    He also notes that he has had pain to the tip of his penis and pain with urination.  He has pain to the left lower abdomen radiating up into his flank region.  Has a prior history of Landon's gangrene back in 2020.  He had surgery to address this.    REVIEW OF SYSTEMS  See HPI for further details. All other systems are negative.     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), Agent orange exposure, Anesthesia, Cancer ([facility]), Diabetes ([facility]), Family history of punctured lung (2002), Heart attack ([facility]) (03/2017), Hemorrhagic disorder ([facility]), High cholesterol, Ischemic cardiomyopathy, Kidney stones, Lupus ([facility]) (11/15/2019), Orthostatic hypotension (3/29/2018), Pain, Polycythemia vera(238.4), Stroke ([facility]) (2016), Urinary bladder disorder, and Vertigo.    SOCIAL HISTORY  Social History     Tobacco Use   • Smoking status: Never Smoker   • Smokeless tobacco: Never Used   Vaping Use   • Vaping Use: Never used   Substance and Sexual Activity   • Alcohol use: No   • Drug use: No   • Sexual activity: Not on file " [FreeTextEntry1] : I, Roberth Pacheco, acted solely as a scribe for Dr. Duke Mercedes on 01/14/2019 .\par \par All medical record entries made by the scribe were at my, Dr. Duke Mercedes, direction and personally dictated by me on 01/14/2019 . I have reviewed the chart and agree that the record accurately reflects my personal performance of the history, physical exam, assessment and plan. I have also personally directed, reviewed, and agreed with the chart.\par  "      SURGICAL HISTORY   has a past surgical history that includes other cardiac surgery; cath placement; laminotomy; appendectomy; carotid endarterectomy (Right, 3/21/2018); temporal artery biopsy (Right, 6/26/2018); incis/drain scrotum/testis,epididym (Right, 7/25/2020); explore scrotum (Right, 7/29/2020); explore scrotum (Right, 7/31/2020); skin abscess incision and drainage (Right, 8/3/2020); split thickness skin graft (N/A, 8/23/2020); aicd implant (07/29/2021); and jacqueline (N/A, 10/30/2021).    CURRENT MEDICATIONS  Home Medications     Reviewed by Usman Zacarias (Pharmacy Tech) on 07/06/22 at 1054  Med List Status: Complete   Medication Last Dose Status   ciprofloxacin (CIPRO) 500 MG Tab 7/3/2022 Active   dapagliflozin propanediol (FARXIGA) 10 MG Tab > 3 weeks Active   finasteride (PROSCAR) 5 MG Tab 7/6/2022 Active   glucose blood strip Unknown Active   hydroxyurea (HYDREA) 500 MG Cap 7/6/2022 Active   nitroglycerin (NITROSTAT) 0.4 MG SL Tab PRN Active   spironolactone (ALDACTONE) 25 MG Tab > 3 weeks Active   sucralfate (CARAFATE) 1 GM Tab > 3 weeks Active   tamsulosin (FLOMAX) 0.4 MG capsule 7/6/2022 Active                ALLERGIES  Allergies   Allergen Reactions   • Doxycycline      Swelling lips and difficulties swallowing   • Atorvastatin      Pt and pts wife are not sure what happens    • Beta Adrenergic Blockers Unspecified     dizziness   • Eplerenone Unspecified     Intolerance, dehydration, altered mental status   • Metformin Unspecified and Palpitations     Cardiac effects  \"Similar to a heart attack, I was hospitalized\"   • Simvastatin      Other reaction(s): Other (Specify with Comments)  Myalgias  Myalgias   • Spironolactone Palpitations   • Statins [Hmg-Coa-R Inhibitors]      Muscle ache, joint pain       PHYSICAL EXAM  VITAL SIGNS: /71   Pulse 87   Temp 36.4 °C (97.5 °F) (Temporal)   Resp 16   Ht 1.854 m (6' 1\")   Wt 77.6 kg (171 lb 1.2 oz)   SpO2 91%   BMI 22.57 kg/m²   Pulse " ox interpretation: I interpret this pulse ox as normal.  Constitutional: Alert in no apparent distress.  HENT: No signs of trauma, Bilateral external ears normal, Nose normal.   Eyes: Conjunctiva normal, Non-icteric.   Neck: Normal range of motion, Supple, No stridor.   Cardiovascular: Regular rate and rhythm.   Thorax & Lungs: Normal breath sounds, No respiratory distress, No wheezing, No chest tenderness.   Abdomen: Bowel sounds normal, Soft, left lower quadrant tenderness, No masses, No pulsatile masses. No peritoneal signs.  Skin: Warm, Dry, No erythema, No rash.  No redness or swelling to the scrotum or penis.  No active penile discharge.  Back: No bony tenderness, No CVA tenderness.   Extremities: Intact distal pulses, No edema, No cyanosis  Musculoskeletal: Good range of motion in all major joints. No major deformities noted.   Neurologic: Alert, No focal deficits noted.       DIAGNOSTIC STUDIES / PROCEDURES    EKG - Physician interpretation  Results for orders placed or performed during the hospital encounter of 22   EKG   Result Value Ref Range    Report       [facility]    Test Date:  2022  Pt Name:    MARZENA ROMEO             Department: [facility] [place]  MRN:        [id number]                      Room:  Gender:     Male                         Technician: [id number]  :        1950                   Requested By:ER TRIAGE PROTOCOL  Order #:    [id number]                    Reading MD: TOMASZ DESHPANDE MD    Measurements  Intervals                                Axis  Rate:       68                           P:          33  DC:         186                          QRS:        -54  QRSD:       110                          T:          118  QT:         403  QTc:        429    Interpretive Statements  Sinus rhythm  Left anterior fascicular block  LVH with secondary repolarization abnormality  Probable anterior infarct, age indeterminate  Compared to ECG 2022  22:33:05  Left anterior fascicular block now present  Myocardial infarct finding now present  Left bundle-branch block no longer present  Q waves no l onger present  Electronically Signed On 2022 10:20:00 PDT by TOMASZ DESHPANDE MD     EKG - STAT Once   Result Value Ref Range    Report       [facility]    Test Date:  2022  Pt Name:    MARZENA ROMEO             Department: [facility]  MRN:        [id number]                      Room:       2219  Gender:     Male                         Technician: [id number]  :        1950                   Requested By:ER TRIAGE PROTOCOL  Order #:    [id number]                    Reading MD: TOMASZ DESHPANDE MD    Measurements  Intervals                                Axis  Rate:       74                           P:          31  TX:         185                          QRS:        -54  QRSD:       116                          T:          111  QT:         410  QTc:        455    Interpretive Statements  Sinus rhythm  Left anterior fascicular block  LVH with secondary repolarization abnormality  Anterior infarct, old  Compared to ECG 2022 10:11:07  No significant changes  Electronically Signed On 2022 21:42:50 PDT by TOMASZ DESHPANDE MD         LABS  Labs Reviewed   CBC WITH DIFFERENTIAL - Abnormal; Notable for the following components:       Result Value    RBC 4.44 (*)     .2 (*)     MCH 36.3 (*)     MCHC 33.2 (*)     RDW 71.9 (*)     Neutrophils-Polys 80.20 (*)     Lymphocytes 9.30 (*)     Immature Granulocytes 1.20 (*)     Lymphs (Absolute) 0.45 (*)     All other components within normal limits   COMP METABOLIC PANEL - Abnormal; Notable for the following components:    Glucose 190 (*)     Total Protein 8.3 (*)     Globulin 4.8 (*)     All other components within normal limits   URINALYSIS - Abnormal; Notable for the following components:    Character Hazy (*)     Leukocyte Esterase Large (*)     Occult Blood Moderate (*)     All  "other components within normal limits   URINE MICROSCOPIC (W/UA) - Abnormal; Notable for the following components:    WBC 20-50 (*)     RBC 2-5 (*)     Yeast Cells Rare (*)     Hyphae Yeast Present (*)     All other components within normal limits   POCT GLUCOSE DEVICE RESULTS - Abnormal; Notable for the following components:    POC Glucose, Blood 222 (*)     All other components within normal limits   POCT GLUCOSE DEVICE RESULTS - Abnormal; Notable for the following components:    POC Glucose, Blood 192 (*)     All other components within normal limits   LIPASE   TROPONIN   TROPONIN   ESTIMATED GFR   PLATELET ESTIMATE   MORPHOLOGY   DIFFERENTIAL COMMENT   LACTIC ACID   TROPONIN   BLOOD CULTURE    Narrative:     Per Hospital Policy: Only change Specimen Src: to \"Line\" if  specified by physician order.   BLOOD CULTURE    Narrative:     Per Hospital Policy: Only change Specimen Src: to \"Line\" if  specified by physician order.   HEMOGLOBIN A1C   URINE CULTURE-EXISTING-LESS THAN 48 HOURS    Narrative:     Indication for culture:->Patient WITHOUT an indwelling Andres  catheter in place with new onset of Dysuria, Frequency,  Urgency, and/or Suprapubic pain         RADIOLOGY  No orders to display         COURSE & MEDICAL DECISION MAKING    Medications   aspirin (ASA) tablet 325 mg (has no administration in time range)     Or   aspirin (ASA) chewable tab 324 mg (has no administration in time range)     Or   aspirin (ASA) suppository 300 mg (has no administration in time range)       Pertinent Labs & Imaging studies reviewed. (See chart for details)  71 y.o. male with hx of CHF, DM, presenting with some confusion and lower abdominal pain and severe pain with urination and at the tip of his penis.  He was told by his primary doctor to come to the ER on 6/30/2022 due to persistent urinary symptoms despite trial treatment with ciprofloxacin.  The pt does have a prior hx of ESBL sensitive to this.      Normal vitals upon arrival. 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  No discharge from penis.  Does have LLQ abd pain.      Blood tests are largely unremarkable.  Urinalysis is consistent with a urinary tract infection.  CT abd pelv was ordered for further evaluation and it showed cystitis / inflammation to bladder with hydroureter.  No obstruction / urolithiasis was identified.     Pt was started on Zosyn.      Given failure of outpatient antibiotics, and persistent pain with new CT findings consistent with urinary infection, along with the patient's abnormal behavior, I suggested hospitalization for IV antibiotics and further monitoring.  I spoke with Dr Birmingham regarding hospitalization.      The patient was instructed to follow-up with primary care physician for further management.  To return immediately for any worsening symptoms or development of any other concerning signs or symptoms. The patient verbalizes understanding in their own words.    /75   Pulse 69   Temp 36.6 °C (97.9 °F) (Oral)   Resp 20   Ht 1.854 m (6' 1\")   Wt 77.6 kg (171 lb 1.2 oz)   SpO2 99%   BMI 22.57 kg/m²       FINAL IMPRESSION  1. Cystitis    2. Altered mental status, unspecified altered mental status type            Electronically signed by: David Keith M.D., 7/6/2022 10:18 AM    "

## 2022-07-06 NOTE — ASSESSMENT & PLAN NOTE
- With associated mild encephalopathy  - Treated with Cipro PTA, no improvement in UA   - Culture from 6/27 negative, will repeat   - History of ESBL - will repeat culture and treat with Zosyn until results   - CT with mild diffuse bladder wall thickening, mild L hydro without any obstructing stone, BPH, and a small pericardial effusion, with probable gallstones   - His symptoms sound suspicious for urinary retention with resulting frequent UTIs - will check PVRs   - Pt does have yeast in his UA, unclear if contaminant or true infection. Will hold on Diflucan for now, discussed with lab who state that they will report out if he grows yeast in his culture. If urine culture otherwise negative for bacteria, would treat yeast at that time.

## 2022-07-06 NOTE — ED NOTES
Med rec updated and complete, per pt and [facility] ([phone])  Allergies reviewed, per pt   Pt was not sure the name of his antibiotic and strengths of his other medications.  Called [facility] @ [phone] to verify all medications   Pt reports that his doctor told him to stop taking his FARXIGA 10MG, SPIRONOLACTONE 25MG (half tablet), and SUCRALFATE 1GM about 3 weeks ago.  Pt was on AMOXACILLIN 875-125MG for a 5 day course on 6/3/2022 pt reports that he did finish course.  Per [facility] last picked up his FINASTERIDE 5MG on 1/2022 for 90 day supply, pt reports that he took this medication today.  Per [facility] pt last filled TAMSULOSIN 0.4MG  On 8/2021 for 90 day course, pt reports that he took this medication today.  Pt last filled TRESIBA on 1/2021 for 30 day supply.  Per [facility] pt last filled his ELIQUIS 5MG on 9/15/2021 for 30 day supply.

## 2022-07-06 NOTE — H&P
Hospital Medicine History & Physical Note    Date of Service  7/6/2022    Primary Care Physician  Tala Dunne M.D.    Consultants  None    Specialist Names: None    Code Status  Prior    Chief Complaint  Chief Complaint   Patient presents with   • Abdominal Pain       History of Presenting Illness  Francesco Schulte is a 71 y.o. 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 He presented to hospital with complaints of painful urination that he states started approximately three weeks ago; he saw his PCP on the 27th for the same, and was given an Rx for Cipro. The patient cannot tell me how long he took the cipro for, if he was compliant with it etc. He returned to office on the 30th with the same complaints and was told to present to the ER, but he did not.     On presentation today, he is afebrile, normotensive and without significant white count, but he has significant suprapubic TTP and complains of severe, uncontrolled dysuria requiring narcotics.  He has somewhat tangential thought and tells me that he has chest pain, but when he refers to his chest pain, he points to his bladder area.  There is concern for encephalopathy. He was admitted to hospital in May with sepsis, UTI and encephalopathy, and has a history of ESBL E coli almost two years ago.      There is significant question about what medications pt is taking at home. For instance, Eliquis is no longer on his MAR and pt was unsure if he still took it, and hasn't been filled in some time, but PCP note from last week indicates that he should still be on it.  Same with Tresiba. A number of meds appeared to have not been filled recently despite pt stating that he still takes them - discussed with his daughter who will see if her  mother can find his medication bottles at home and let us know if he has actually filled them recently.     He will be admitted as an outpatient failure on Cipro, treat UTI and discharge when encephalopathy resolves and pain controlled.     I discussed the plan of care with patient, family and pharmacy.    Review of Systems  Review of Systems   Constitutional: Negative for chills and fever.   Respiratory: Negative for cough and shortness of breath.    Cardiovascular: Negative for chest pain and leg swelling.   Gastrointestinal: Positive for abdominal pain and nausea. Negative for vomiting.   Genitourinary: Positive for dysuria and flank pain.   All other systems reviewed and are negative.      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), Agent orange exposure, Anesthesia, Cancer ([facility]), Diabetes ([facility]), Family history of punctured lung (2002), Heart attack ([facility]) (03/2017), Hemorrhagic disorder ([facility]), High cholesterol, Ischemic cardiomyopathy, Kidney stones, Lupus ([facility]) (11/15/2019), Orthostatic hypotension (3/29/2018), Pain, Polycythemia vera(238.4), Stroke ([facility]) (2016), Urinary bladder disorder, and Vertigo.    Surgical History   has a past surgical history that includes other cardiac surgery; cath placement; laminotomy; appendectomy; carotid endarterectomy (Right, 3/21/2018); temporal artery biopsy (Right, 6/26/2018); pr incis/drain scrotum/testis,epididym (Right, 7/25/2020); pr explore scrotum (Right, 7/29/2020); pr explore scrotum (Right, 7/31/2020); skin abscess incision and drainage (Right, 8/3/2020); split thickness skin graft (N/A, 8/23/2020); aicd implant (07/29/2021); and jacqueline (N/A, 10/30/2021).     Family History  family history is not on file.   Family history reviewed with patient. There is no family history that is pertinent to the chief complaint.     Social History   reports that he has never smoked. He has never used smokeless tobacco. He reports that  "he does not drink alcohol and does not use drugs.    Allergies  Allergies   Allergen Reactions   • Doxycycline      Swelling lips and difficulties swallowing   • Atorvastatin      Pt and pts wife are not sure what happens    • Beta Adrenergic Blockers Unspecified     dizziness   • Eplerenone Unspecified     Intolerance, dehydration, altered mental status   • Metformin Unspecified and Palpitations     Cardiac effects  \"Similar to a heart attack, I was hospitalized\"   • Simvastatin      Other reaction(s): Other (Specify with Comments)  Myalgias  Myalgias   • Spironolactone Palpitations   • Statins [Hmg-Coa-R Inhibitors]      Muscle ache, joint pain       Medications  Prior to Admission Medications   Prescriptions Last Dose Informant Patient Reported? Taking?   ciprofloxacin (CIPRO) 500 MG Tab 7/3/2022 at FINISHED Patient's Home Pharmacy Yes Yes   Sig: Take 500 mg by mouth 2 times a day. Pt started on 2022 for 7 day course   dapagliflozin propanediol (FARXIGA) 10 MG Tab > 3 weeks at STOPPED Patient's Home Pharmacy Yes No   Sig: Take 5 mg by mouth every day.   finasteride (PROSCAR) 5 MG Tab 2022 at 0700 Patient's Home Pharmacy No No   Sig: Take 1 Tablet by mouth every day.   glucose blood strip Unknown at Unknown Patient's Home Pharmacy No No   Si Strip by Other route as needed.   hydroxyurea (HYDREA) 500 MG Cap 2022 at 0700 Patient's Home Pharmacy Yes No   Sig: Take 500 mg by mouth 2 Times a Day.   nitroglycerin (NITROSTAT) 0.4 MG SL Tab PRN Patient's Home Pharmacy No No   Sig: Place 1 Tablet under the tongue as needed for Chest Pain (or hypertension >180/110).   spironolactone (ALDACTONE) 25 MG Tab > 3 weeks at STOPPED Patient's Home Pharmacy Yes Yes   Sig: Take 12.5 mg by mouth every day.   sucralfate (CARAFATE) 1 GM Tab > 3 weeks at STOPPED Patient's Home Pharmacy Yes No   Sig: Take 1 g by mouth 4 times a day.   tamsulosin (FLOMAX) 0.4 MG capsule 2022 at 0700 Patient's Home Pharmacy No No   Sig: " Take 1 Capsule by mouth every day.      Facility-Administered Medications: None       Physical Exam  Temp:  [36.4 °C (97.5 °F)] 36.4 °C (97.5 °F)  Pulse:  [64-87] 64  Resp:  [16] 16  BP: (101-142)/(71-84) 142/84  SpO2:  [91 %-99 %] 99 %  Blood Pressure : (!) 142/84   Temperature: 36.4 °C (97.5 °F)   Pulse: 64   Respiration: 16   Pulse Oximetry: 99 %       Physical Exam  Vitals and nursing note reviewed.   Constitutional:       Appearance: He is ill-appearing. He is not toxic-appearing.   HENT:      Head: Normocephalic and atraumatic.      Mouth/Throat:      Mouth: Mucous membranes are dry.   Cardiovascular:      Rate and Rhythm: Normal rate and regular rhythm.      Heart sounds: No murmur heard.  Pulmonary:      Effort: Pulmonary effort is normal. No respiratory distress.      Breath sounds: Normal breath sounds. No wheezing, rhonchi or rales.   Abdominal:      General: Abdomen is flat. Bowel sounds are normal. There is no distension.      Palpations: Abdomen is soft.      Tenderness: There is abdominal tenderness (Mild, in all four quadrants). There is no guarding or rebound.      Hernia: No hernia is present.   Genitourinary:     Penis: Normal.    Musculoskeletal:         General: No swelling or tenderness. Normal range of motion.   Skin:     General: Skin is warm and dry.      Coloration: Skin is not jaundiced.   Neurological:      General: No focal deficit present.      Mental Status: He is alert and oriented to person, place, and time.   Psychiatric:         Attention and Perception: Attention normal.         Mood and Affect: Mood is anxious.         Speech: Speech normal.         Behavior: Behavior is slowed.         Laboratory:  Recent Labs     07/06/22  1025   WBC 4.8   RBC 4.44*   HEMOGLOBIN 16.1   HEMATOCRIT 48.5   .2*   MCH 36.3*   MCHC 33.2*   RDW 71.9*   PLATELETCT 255   MPV 10.2     Recent Labs     07/06/22  1025   SODIUM 136   POTASSIUM 4.1   CHLORIDE 101   CO2 24   GLUCOSE 190*   BUN 18    CREATININE 0.96   CALCIUM 9.5     Recent Labs     07/06/22  1025   ALTSGPT 18   ASTSGOT 17   ALKPHOSPHAT 69   TBILIRUBIN 0.7   LIPASE 30   GLUCOSE 190*         No results for input(s): NTPROBNP in the last 72 hours.      Recent Labs     07/06/22  1025   TROPONINT 15       Imaging:  CT-ABDOMEN-PELVIS WITH   Final Result      1.  Mild diffuse bladder wall thickening favoring cystitis however infiltrative processes not excluded.   2.  Mild LEFT hydronephrosis with urothelial thickening.   3.  No obstructing ureteral stone.   4.  Mildly enlarged prostate again seen.   5.  Bilateral kidney cysts.   6.  Splenomegaly, nonspecific.   7.  Small pericardial effusion, slightly increased from prior.   8.  Probable gallstones.             EKG:  I have personally reviewed the images and compared with prior images. and My impression is: Incomplete LBBB with chronically inverted T waves laterally    Assessment/Plan:  Justification for Admission Status  I anticipate this patient is appropriate for observation status at this time because Pt with UTI and history of ESBL, will need urine culture results    * Acute cystitis  Assessment & Plan  - With associated mild encephalopathy  - Treated with Cipro PTA, no improvement in UA   - Culture from 6/27 negative, will repeat   - History of ESBL - will repeat culture and treat with Zosyn until results   - CT with mild diffuse bladder wall thickening, mild L hydro without any obstructing stone, BPH, and a small pericardial effusion, with probable gallstones   - His symptoms sound suspicious for urinary retention with resulting frequent UTIs - will check PVRs   - Pt does have yeast in his UA, unclear if contaminant or true infection. Will hold on Diflucan for now, discussed with lab who state that they will report out if he grows yeast in his culture. If urine culture otherwise negative for bacteria, would treat yeast at that time.     Medically noncompliant  Assessment & Plan  - Per central  search, pt has not filled a significant amount of his established medications - though pt relates taking them. PCP and outside notes indicate that he should be taking a significant number of these medications. Pt a very poor historian.  - Pt lives with his wife in a single family home, his daughter reports that while they live together, they aren't a couple and live their lives separately. She reports that he does perform his ADLs but has had cardiac worsening lately that has caused some decline.   - His daughter states that she is unsure if he is taking his medications, and he won't allow his wife to help him with his medications  - Discussed with his daughter that pt may be appropriate for assisted living. She is going to have his wife search for his medications in the house so that we know that he is taking them.    Toxic encephalopathy  Assessment & Plan  - Likely secondary to UTI   - Monitor for resolution with treatment of UTI   - Mild, no focal neuro deficits       Ischemic cardiomyopathy- (present on admission)  Assessment & Plan  - Last EF was 15% in January  - AICD in place  - Not on any CHF meds (aldactone, dig, ACEI etc) due to history of orthostasis   - Is not on a diuretic  - Follows at [facility]    - No exacerbation clinically     Cholelithiasis- (present on admission)  Assessment & Plan  - Chronic, no acute symptoms at this time. If he develops localized RUQ pain, suggest u/s vs HIDA.    Pericardial effusion  Assessment & Plan  - May be related to his previous question of  lupus diagnosis  - Seen on CT here, was also present on prior CTs, but has increased in size since then  - No physical signs of tamponade, check echo  - Bps and HR stable, not requiring O2.     SLE (systemic lupus erythematosus related syndrome) (HCC)- (present on admission)  Assessment & Plan  - Does not appear to be on any meds  - Previously appears to have been on azothiaprine at one time    Polycythemia vera (HCC)- (present on  admission)  Assessment & Plan  - Continue home hydrea     History of stroke- old right parietal/occipital- (present on admission)  Assessment & Plan  - I can see no indication of why pt should not be on his previous Eliquis; will resume    History of ST elevation myocardial infarction (STEMI)- (present on admission)  Assessment & Plan  - Resume Eliquis  - Statin intolerant  - Not on ASA due to Eliquis per PCP notes    Type 2 diabetes mellitus without complication, with long-term current use of insulin (HCC)- (present on admission)  Assessment & Plan  - Appears to previously have been on Glipizide, Farxiga and Tresiba previously, MAR now indicates no DM meds. It appears his PCP is under the impression that he still takes Farxiga and Tresiba, but pt has not filled his Tresiba since January per MAR tech  - Check A1c  - SSI    Paroxysmal A-fib (HCC)- (present on admission)  Assessment & Plan  - Pt previously on Eliquis, but no longer on his MAR  - MAR tech to clarify   - Not on any rate controlling agents  - I see no contraindication to Eliquis, will resume while in hospital    Benign prostatic hyperplasia with urinary retention- (present on admission)  Assessment & Plan  - Pharmacy states he has not recently filled his finasteride or flomax, pt states he took them. Central search did not reveal any pharmacies that filled his medications  - Resume both Flomax and Finasteride, I suspect pt may a component of urinary retention contributing to his UTIs   - Qshift PVRs      VTE prophylaxis: SCDs/TEDs and therapeutic anticoagulation with Eliquis

## 2022-07-06 NOTE — ASSESSMENT & PLAN NOTE
- Pt previously on Eliquis, but no longer on his MAR  - MAR tech to clarify   - Not on any rate controlling agents  - I see no contraindication to Eliquis, will resume while in hospital

## 2022-07-06 NOTE — ASSESSMENT & PLAN NOTE
- Appears to previously have been on Glipizide, Farxiga and Tresiba previously, MAR now indicates no DM meds. It appears his PCP is under the impression that he still takes Farxiga and Tresiba, but pt has not filled his Tresiba since January per MAR tech  - Check A1c  - SSI

## 2022-07-06 NOTE — ASSESSMENT & PLAN NOTE
- Last EF was 15% in January  - AICD in place  - Not on any CHF meds (aldactone, dig, ACEI etc) due to history of orthostasis   - Is not on a diuretic  - Follows at [facility]    - No exacerbation clinically

## 2022-07-06 NOTE — ASSESSMENT & PLAN NOTE
- Pharmacy states he has not recently filled his finasteride or flomax, pt states he took them. Central search did not reveal any pharmacies that filled his medications  - Resume both Flomax and Finasteride, I suspect pt may a component of urinary retention contributing to his UTIs   - Qshift PVRs

## 2022-07-06 NOTE — ASSESSMENT & PLAN NOTE
- May be related to his previous question of  lupus diagnosis  - Seen on CT here, was also present on prior CTs, but has increased in size since then  - No physical signs of tamponade, check echo  - Bps and HR stable, not requiring O2.

## 2022-07-06 NOTE — ASSESSMENT & PLAN NOTE
- Per central search, pt has not filled a significant amount of his established medications - though pt relates taking them. PCP and outside notes indicate that he should be taking a significant number of these medications. Pt a very poor historian.  - Pt lives with his wife in a single family home, his daughter reports that while they live together, they aren't a couple and live their lives separately. She reports that he does perform his ADLs but has had cardiac worsening lately that has caused some decline.   - His daughter states that she is unsure if he is taking his medications, and he won't allow his wife to help him with his medications  - Discussed with his daughter that pt may be appropriate for assisted living. She is going to have his wife search for his medications in the house so that we know that he is taking them.

## 2022-07-06 NOTE — ED NOTES
Upon transport Echo tech came to bedside. Per Charge RN allow echo to finish before bringing patient up to the floor.

## 2022-07-06 NOTE — ED TRIAGE NOTES
"Pt amb to triage c/o general malaise x several weeks. Pt states \" I have an infection somewhere in my body\". Pt seen at pcp last week and instr to go to er  "

## 2022-07-07 ENCOUNTER — PHARMACY VISIT (OUTPATIENT)
Dept: PHARMACY | Facility: MEDICAL CENTER | Age: 72
End: 2022-07-07
Payer: MEDICARE

## 2022-07-07 VITALS
HEART RATE: 68 BPM | TEMPERATURE: 98.6 F | WEIGHT: 171.08 LBS | BODY MASS INDEX: 22.67 KG/M2 | SYSTOLIC BLOOD PRESSURE: 138 MMHG | HEIGHT: 73 IN | RESPIRATION RATE: 16 BRPM | DIASTOLIC BLOOD PRESSURE: 82 MMHG | OXYGEN SATURATION: 96 %

## 2022-07-07 PROBLEM — N30.00 ACUTE CYSTITIS: Status: RESOLVED | Noted: 2022-07-06 | Resolved: 2022-07-07

## 2022-07-07 PROBLEM — N30.90 CYSTITIS: Status: RESOLVED | Noted: 2022-07-06 | Resolved: 2022-07-07

## 2022-07-07 PROBLEM — G92.9 TOXIC ENCEPHALOPATHY: Status: RESOLVED | Noted: 2022-05-31 | Resolved: 2022-07-07

## 2022-07-07 PROBLEM — I31.39 PERICARDIAL EFFUSION: Status: RESOLVED | Noted: 2020-10-01 | Resolved: 2022-07-07

## 2022-07-07 LAB
ANION GAP SERPL CALC-SCNC: 11 MMOL/L (ref 7–16)
BASOPHILS # BLD AUTO: 0.9 % (ref 0–1.8)
BASOPHILS # BLD: 0.04 K/UL (ref 0–0.12)
BUN SERPL-MCNC: 17 MG/DL (ref 8–22)
CALCIUM SERPL-MCNC: 8.8 MG/DL (ref 8.4–10.2)
CHLORIDE SERPL-SCNC: 106 MMOL/L (ref 96–112)
CO2 SERPL-SCNC: 21 MMOL/L (ref 20–33)
CREAT SERPL-MCNC: 0.82 MG/DL (ref 0.5–1.4)
EOSINOPHIL # BLD AUTO: 0.08 K/UL (ref 0–0.51)
EOSINOPHIL NFR BLD: 1.9 % (ref 0–6.9)
ERYTHROCYTE [DISTWIDTH] IN BLOOD BY AUTOMATED COUNT: 71.7 FL (ref 35.9–50)
EST. AVERAGE GLUCOSE BLD GHB EST-MCNC: 194 MG/DL
GFR SERPLBLD CREATININE-BSD FMLA CKD-EPI: 95 ML/MIN/1.73 M 2
GLUCOSE BLD STRIP.AUTO-MCNC: 122 MG/DL (ref 65–99)
GLUCOSE BLD STRIP.AUTO-MCNC: 156 MG/DL (ref 65–99)
GLUCOSE SERPL-MCNC: 154 MG/DL (ref 65–99)
HBA1C MFR BLD: 8.4 % (ref 4–5.6)
HCT VFR BLD AUTO: 43.3 % (ref 42–52)
HGB BLD-MCNC: 14.5 G/DL (ref 14–18)
IMM GRANULOCYTES # BLD AUTO: 0.04 K/UL (ref 0–0.11)
IMM GRANULOCYTES NFR BLD AUTO: 0.9 % (ref 0–0.9)
LYMPHOCYTES # BLD AUTO: 0.5 K/UL (ref 1–4.8)
LYMPHOCYTES NFR BLD: 11.8 % (ref 22–41)
MCH RBC QN AUTO: 36.7 PG (ref 27–33)
MCHC RBC AUTO-ENTMCNC: 33.5 G/DL (ref 33.7–35.3)
MCV RBC AUTO: 109.6 FL (ref 81.4–97.8)
MONOCYTES # BLD AUTO: 0.32 K/UL (ref 0–0.85)
MONOCYTES NFR BLD AUTO: 7.6 % (ref 0–13.4)
NEUTROPHILS # BLD AUTO: 3.25 K/UL (ref 1.82–7.42)
NEUTROPHILS NFR BLD: 76.9 % (ref 44–72)
NRBC # BLD AUTO: 0 K/UL
NRBC BLD-RTO: 0 /100 WBC
PLATELET # BLD AUTO: 226 K/UL (ref 164–446)
PMV BLD AUTO: 10.3 FL (ref 9–12.9)
POTASSIUM SERPL-SCNC: 3.6 MMOL/L (ref 3.6–5.5)
RBC # BLD AUTO: 3.95 M/UL (ref 4.7–6.1)
SODIUM SERPL-SCNC: 138 MMOL/L (ref 135–145)
WBC # BLD AUTO: 4.2 K/UL (ref 4.8–10.8)

## 2022-07-07 PROCEDURE — 96376 TX/PRO/DX INJ SAME DRUG ADON: CPT

## 2022-07-07 PROCEDURE — 99217 PR OBSERVATION CARE DISCHARGE: CPT | Performed by: INTERNAL MEDICINE

## 2022-07-07 PROCEDURE — 51798 US URINE CAPACITY MEASURE: CPT

## 2022-07-07 PROCEDURE — 700102 HCHG RX REV CODE 250 W/ 637 OVERRIDE(OP): Performed by: INTERNAL MEDICINE

## 2022-07-07 PROCEDURE — 85025 COMPLETE CBC W/AUTO DIFF WBC: CPT

## 2022-07-07 PROCEDURE — 97161 PT EVAL LOW COMPLEX 20 MIN: CPT

## 2022-07-07 PROCEDURE — 700102 HCHG RX REV CODE 250 W/ 637 OVERRIDE(OP): Performed by: FAMILY MEDICINE

## 2022-07-07 PROCEDURE — A9270 NON-COVERED ITEM OR SERVICE: HCPCS | Performed by: INTERNAL MEDICINE

## 2022-07-07 PROCEDURE — 700105 HCHG RX REV CODE 258: Performed by: FAMILY MEDICINE

## 2022-07-07 PROCEDURE — 96366 THER/PROPH/DIAG IV INF ADDON: CPT

## 2022-07-07 PROCEDURE — G0378 HOSPITAL OBSERVATION PER HR: HCPCS

## 2022-07-07 PROCEDURE — 94760 N-INVAS EAR/PLS OXIMETRY 1: CPT

## 2022-07-07 PROCEDURE — 700111 HCHG RX REV CODE 636 W/ 250 OVERRIDE (IP): Performed by: FAMILY MEDICINE

## 2022-07-07 PROCEDURE — 80048 BASIC METABOLIC PNL TOTAL CA: CPT

## 2022-07-07 PROCEDURE — A9270 NON-COVERED ITEM OR SERVICE: HCPCS | Performed by: FAMILY MEDICINE

## 2022-07-07 PROCEDURE — 82962 GLUCOSE BLOOD TEST: CPT | Mod: 91

## 2022-07-07 PROCEDURE — RXMED WILLOW AMBULATORY MEDICATION CHARGE: Performed by: INTERNAL MEDICINE

## 2022-07-07 PROCEDURE — 36415 COLL VENOUS BLD VENIPUNCTURE: CPT

## 2022-07-07 RX ORDER — OXYBUTYNIN CHLORIDE 5 MG/1
5 TABLET ORAL 3 TIMES DAILY
Status: DISCONTINUED | OUTPATIENT
Start: 2022-07-07 | End: 2022-07-07 | Stop reason: HOSPADM

## 2022-07-07 RX ORDER — OXYBUTYNIN CHLORIDE 5 MG/1
5 TABLET ORAL 3 TIMES DAILY
Qty: 90 TABLET | Refills: 0 | Status: SHIPPED | OUTPATIENT
Start: 2022-07-07 | End: 2022-08-06

## 2022-07-07 RX ADMIN — OXYCODONE HYDROCHLORIDE 10 MG: 10 TABLET ORAL at 09:52

## 2022-07-07 RX ADMIN — MORPHINE SULFATE 4 MG: 4 INJECTION INTRAVENOUS at 05:42

## 2022-07-07 RX ADMIN — APIXABAN 5 MG: 5 TABLET, FILM COATED ORAL at 05:46

## 2022-07-07 RX ADMIN — HYDROXYUREA 500 MG: 500 CAPSULE ORAL at 05:47

## 2022-07-07 RX ADMIN — PIPERACILLIN SODIUM AND TAZOBACTAM SODIUM 3.38 G: 3; 375 INJECTION, POWDER, FOR SOLUTION INTRAVENOUS at 04:32

## 2022-07-07 RX ADMIN — PIPERACILLIN SODIUM AND TAZOBACTAM SODIUM 3.38 G: 3; 375 INJECTION, POWDER, FOR SOLUTION INTRAVENOUS at 12:56

## 2022-07-07 RX ADMIN — OXYCODONE HYDROCHLORIDE 10 MG: 10 TABLET ORAL at 04:15

## 2022-07-07 RX ADMIN — OXYBUTYNIN CHLORIDE 5 MG: 5 TABLET ORAL at 12:53

## 2022-07-07 RX ADMIN — TAMSULOSIN HYDROCHLORIDE 0.4 MG: 0.4 CAPSULE ORAL at 05:47

## 2022-07-07 RX ADMIN — SENNOSIDES AND DOCUSATE SODIUM 2 TABLET: 50; 8.6 TABLET ORAL at 05:46

## 2022-07-07 RX ADMIN — MORPHINE SULFATE 4 MG: 4 INJECTION INTRAVENOUS at 11:23

## 2022-07-07 RX ADMIN — FINASTERIDE 5 MG: 5 TABLET, FILM COATED ORAL at 05:46

## 2022-07-07 ASSESSMENT — PAIN DESCRIPTION - PAIN TYPE
TYPE: ACUTE PAIN

## 2022-07-07 ASSESSMENT — COGNITIVE AND FUNCTIONAL STATUS - GENERAL
SUGGESTED CMS G CODE MODIFIER MOBILITY: CH
MOBILITY SCORE: 24

## 2022-07-07 ASSESSMENT — GAIT ASSESSMENTS
GAIT LEVEL OF ASSIST: SUPERVISED
DISTANCE (FEET): 200
DEVIATION: STEP TO

## 2022-07-07 NOTE — CARE PLAN
The patient is Watcher - Medium risk of patient condition declining or worsening    Shift Goals  Clinical Goals: Pain management  Patient Goals: Pain control and rest  Family Goals: N/A    Progress made toward(s) clinical / shift goals:  Pain has been managed with Oxy 10 and Morphine.  Patient stated he has relief of pain.  Patient lying in bed, resting comfortably.    Patient is not progressing towards the following goals:  N/A.

## 2022-07-07 NOTE — CARE PLAN
The patient is Stable - Low risk of patient condition declining or worsening    Shift Goals  Clinical Goals: Pt will decrease pain 3/10 this shift  Patient Goals: Adequate pain control  Family Goals: N/A    Progress made toward(s) clinical / shift goals: PRN medication given for pain control. Go catheter inserted. After giving oxybutynin and go catheter insertion. Pt  states pain is well controlled. Able to rest.    Patient is not progressing towards the following goals:      Problem: Pain - Standard  Goal: Alleviation of pain or a reduction in pain to the patient’s comfort goal  Outcome: Progressing     Problem: Urinary Elimination  Goal: Establish and maintain regular urinary output  Outcome: Progressing     Problem: Discharge Barriers/Planning  Goal: Patient's continuum of care needs are met  Outcome: Progressing  Flowsheets (Taken 7/7/2022 1623)  Continuum of Care Needs:   Assessed for discharge barriers   Involved patient/family/support system in discharge process   Provided and explained discharge instructions   Communicated discharge barriers to interdisciplinary william   Collaborated with Case Management/

## 2022-07-07 NOTE — PROGRESS NOTES
Received bedside report.  Assumed pt care.   Assessment and chart check complete.  Pt is AA0X4, no signs of distress, denies nausea/vomiting.  Patient has pain and burning sensation when urinating.  Pt pain currently 9/10, medicated per MAR.   1400-Andres catheter inserted F16 draining to raulito urine output.  Fall precautions in place, treaded socks on pt, bed in low position.   Call light within reach.   Educated pt to call if needing anything.   Hourly rounding.

## 2022-07-07 NOTE — PROGRESS NOTES
Discharging patient home per MD order. Pt demonstrated understanding of discharge instructions, follow up appointments, home medications, prescriptions, and home care for og catheter. Pt pain well controlled, tolerating oral medications, O2 greater than 90%.Pt verbalized understanding of discharge instructions and educational handouts and all questions answered. PIV removed. Home medication delivered at bedside. Pt discharged off unit with hospital escort and go catheter.

## 2022-07-07 NOTE — DISCHARGE INSTRUCTIONS
Diet    Diabetic Diet     A Diabetic Diet can help you control your blood glucose, lower your risk of heart disease, improve your blood pressure and maintain a healthy weight.  Eating healthy foods, low in calories, fat and carbohydrates at the same time every day can help control your blood glucose. Carbohydrates can greatly affect your blood glucose levels.  Counting carbs is important to keep your blood glucose at a healthy level, especially if you use insulin or take certain oral diabetes medicines.  Avoid alcohol as it can cause a sudden decrease in blood glucose (hypoglycemia), especially if you use insulin or take certain oral diabetes medicines.

## 2022-07-07 NOTE — PROGRESS NOTES
Telemetry Shift Summary     Rhythm: SR  Rate: 53-86  Measurements: .20/.12/.42  Ectopy (reported by Monitor Tech): r-o PVC, r bigem, r trigem  *5 bts vtach     Normal Values  Rhythm: Sinus  HR:   Measurements: 0.12-0.20/0.06-0.10/0.30-0.52

## 2022-07-07 NOTE — PROGRESS NOTES
Telemetry Shift Summary     Rhythm: SB-SR  Rate: 51-72  Measurements: .20/.12/.42  Ectopy (reported by Monitor Tech):  r PVC, r PAC, 6 bts vtac      Normal Values  Rhythm: Sinus  HR:   Measurements: 0.12-0.20/0.06-0.10/0.30-0.52

## 2022-07-07 NOTE — DISCHARGE PLANNING
Case Management Discharge Planning    Admission Date: 7/6/2022  GMLOS:    ALOS: 0    6-Clicks ADL Score: 22  6-Clicks Mobility Score: 19      Anticipated Discharge Dispo: Discharge Disposition: Discharged to home/self care (01)    DME Needed: No    Action(s) Taken: Updated Provider/Nurse on Discharge Plan     Pending PT/OT evals.     Escalations Completed: PT    Medically Clear: No    Next Steps: Pending PT/OT evals    Barriers to Discharge: Pending PT Evaluation

## 2022-07-07 NOTE — DISCHARGE PLANNING
CHW met with pt bedside to introduce CCM services. Pt appeared to be uncomfortable and trying to rest. Pt was given brief outline of CCM info. Pt did not seem interested in services offered. Pt declined follow up call post discharge and wished to reach out himself to CCM if needed.

## 2022-07-07 NOTE — THERAPY
Occupational Therapy     Patient Name: Francesco Schulte  Age:  71 y.o., Sex:  male  Medical Record #: [id number]  Today's Date: 7/7/2022    Discussed therapy orders with PT, RN       07/07/22 8413   Interdisciplinary Plan of Care Collaboration   Collaboration Comments OT orders rec'd, pt was already seen by PT and was mobilizing well.  Per PT recommendation, patient does not demonstrate any skilled OT needs at this time.  Will defer OT eval based on PT recommendations.

## 2022-07-07 NOTE — THERAPY
Physical Therapy   Initial Evaluation     Patient Name: Francesco Schulte  Age:  71 y.o., Sex:  male  Medical Record #: [id number]  Today's Date: 7/7/2022          Assessment  Patient is 71 y.o. male with a diagnosis of dysuria,history of ischemic cardiomyopathy.Pt lives at home with wife and was active.Pt is safe with bed mob,transfers and ambulation.No equipment needs.Pt is safe for home      Plan    Recommend Physical Therapy for Evaluation only   07/07/22 1400   Charge Group   PT Evaluation PT Evaluation Low   Total Time Spent   PT Total Time Yes   PT Evaluation Time Spent (Mins) 30   Initial Contact Note    Initial Contact Note Order Received and Verified, Physical Therapy Evaluation in Progress with Full Report to Follow.   Prior Living Situation   Prior Services None   Housing / Facility 1 Story House   Steps Into Home 0   Steps In Home 0   Equipment Owned Single Point Cane   Lives with - Patient's Self Care Capacity Spouse   Prior Level of Functional Mobility   Bed Mobility Independent   Transfer Status Independent   Ambulation Independent   Distance Ambulation (Feet)   (community amb)   Assistive Devices Used None   Stairs Independent   Cognition    Cognition / Consciousness WDL   Level of Consciousness Alert   Passive ROM Lower Body   Passive ROM Lower Body WDL   Active ROM Lower Body    Active ROM Lower Body  WDL   Strength Lower Body   Lower Body Strength  WDL   Coordination Lower Body    Coordination Lower Body  WDL   Balance Assessment   Sitting Balance (Static) Good   Sitting Balance (Dynamic) Good   Standing Balance (Static) Fair +   Standing Balance (Dynamic) Fair +   Weight Shift Sitting Good   Weight Shift Standing Good   Gait Analysis   Gait Level Of Assist Supervised   Assistive Device None   Distance (Feet) 200   # of Times Distance was Traveled 1   Deviation Step To   Weight Bearing Status full   Bed Mobility    Supine to Sit Supervised   Sit to Supine Supervised   Scooting Supervised    Functional Mobility   Sit to Stand Supervised   Bed, Chair, Wheelchair Transfer Supervised   Transfer Method Stand Step   How much difficulty does the patient currently have...   Turning over in bed (including adjusting bedclothes, sheets and blankets)? 4   Sitting down on and standing up from a chair with arms (e.g., wheelchair, bedside commode, etc.) 4   Moving from lying on back to sitting on the side of the bed? 4   How much help from another person does the patient currently need...   Moving to and from a bed to a chair (including a wheelchair)? 4   Need to walk in a hospital room? 4   Climbing 3-5 steps with a railing? 4   6 clicks Mobility Score 24   Activity Tolerance   Sitting Edge of Bed 10   Standing 10   Patient / Family Goals    Patient / Family Goal #1 home   Anticipated Discharge Equipment and Recommendations   DC Equipment Recommendations None   Discharge Recommendations Anticipate that the patient will have no further physical therapy needs after discharge from the hospital   Interdisciplinary Plan of Care Collaboration   IDT Collaboration with  Nursing   Session Information   Date / Session Number  7/7   Priority 0       DC Equipment Recommendations: (P) None  Discharge Recommendations: (P) Anticipate that the patient will have no further physical therapy needs after discharge from the hospital

## 2022-07-07 NOTE — PROGRESS NOTES
4 Eyes Skin Assessment Completed by JOSLYN Rodriguez and JOSLYN Corbett.    Head WDL  Ears WDL  Nose WDL  Mouth WDL  Neck WDL  Breast/Chest WDL  Shoulder Blades WDL  Spine WDL  (R) Arm/Elbow/Hand WDL  (L) Arm/Elbow/Hand WDL  Abdomen WDL  Groin WDL  Scrotum/Coccyx/Buttocks WDL  (R) Leg WDL  (L) Leg WDL  (R) Heel/Foot/Toe WDL  (L) Heel/Foot/Toe WDL          Devices In Places Pulse Ox      Interventions In Place N/A    Possible Skin Injury No    Pictures Uploaded Into Epic N/A  Wound Consult Placed N/A  RN Wound Prevention Protocol Ordered No

## 2022-07-07 NOTE — DISCHARGE SUMMARY
"Discharge Summary    CHIEF COMPLAINT ON ADMISSION  Chief Complaint   Patient presents with   • Abdominal Pain       Reason for Admission  Abdominal pain, Chest pain      Admission Date  7/6/2022    CODE STATUS  Full Code    HPI & HOSPITAL COURSE  Per notes, \"71 y.o. 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 He presented to hospital with complaints of painful urination that he states started approximately three weeks ago; he saw his PCP on the 27th for the same, and was given an Rx for Cipro. The patient cannot tell me how long he took the cipro for, if he was compliant with it etc. He returned to office on the 30th with the same complaints and was told to present to the ER, but he did not.      On presentation today, he is afebrile, normotensive and without significant white count, but he has significant suprapubic TTP and complains of severe, uncontrolled dysuria requiring narcotics.  He has somewhat tangential thought and tells me that he has chest pain, but when he refers to his chest pain, he points to his bladder area.  There is concern for encephalopathy. He was admitted to hospital in May with sepsis, UTI and encephalopathy, and has a history of ESBL E coli almost two years ago.       There is significant question about what medications pt is taking at home. For instance, Eliquis is no longer on his MAR and pt was unsure if he still took it, and hasn't been filled in some time, but PCP note from last week indicates that he should still be on it.  Same with Tresiba. A number of meds appeared to have not been filled recently despite pt stating that he still takes them - discussed with his daughter who will see if her mother can find his medication bottles at home and let us know if he has actually " "filled them recently.\"    Patient was still having suprapubic and urinary pain on exam. Discussed case with urologist on call, [facility] and recommended go placement, oxybutinin for bladder spasm and follow-up outpatient setting. There was no growth on previous urine cultures and symptoms are likely caused by retention and bladder spasm. Patient will be discharged with close follow-up in the outpatient setting with urology. I also recommended he follow with PCP.     Therefore, he is discharged in good and stable condition to home with close outpatient follow-up.    The patient recovered much more quickly than anticipated on admission.    Discharge Date  7/7/2022    FOLLOW UP ITEMS POST DISCHARGE  FU with PCP   FU with urology. Call [facility] and schedule appointment within 1 week.     DISCHARGE DIAGNOSES  Principal Problem (Resolved):    Acute cystitis POA: Unknown  Active Problems:    Benign prostatic hyperplasia with urinary retention POA: Yes    Paroxysmal A-fib (HCC) (Chronic) POA: Yes    Type 2 diabetes mellitus without complication, with long-term current use of insulin (HCC) POA: Yes      Overview: This is a chronic condition uncontrolled, managed by endocrinology Dr. Mckeon.  His current regimen includes Tresiba 10  units daily, Farxiga 5       mg (he will hold for 1 week)            Lab Results       Component Value Date/Time        HBA1C 8.0 (H) 06/23/2022 12:55 PM                    Last A1c:       Lab Results       Component Value Date/Time        HBA1C 7.2 (H) 03/31/2022 1359        AVGLUC 160 03/31/2022 1359             Last Microalb/Cr ratio:       Lab Results       Component Value Date/Time        URCREAT 34.30 03/31/2022 1400        MICROALBUR 4.8 03/31/2022 1400        MALBCRT 140 (H) 03/31/2022 1400             Last A1c:       Lab Results       Component Value Date/Time        HBA1C 7.4 (A) 07/14/2021 0853        AVGLUC 157 04/02/2021 1524             Last Microalb/Cr ratio: "       Lab Results       Component Value Date/Time        URCREAT 65.71 01/20/2021 0931        MICROALBUR 5.4 01/20/2021 0931        MALBCRT 82 (H) 01/20/2021 0931             Fasting sugars: 130s, 215-220 postprandial      Last diabetic foot exam: 8 mo ago      Last retinal eye exam: due, referred      ACEi/ARB: contraindicated due to orthostatic hypotension, on Farxyga       Statin: intolerant       Aspirin: no, on Eliquis      Concomitant HTN: no                History of ST elevation myocardial infarction (STEMI) (Chronic) POA: Yes    History of stroke- old right parietal/occipital POA: Yes    Polycythemia vera (HCC) POA: Yes    SLE (systemic lupus erythematosus related syndrome) (HCC) POA: Yes    Cholelithiasis POA: Yes    Ischemic cardiomyopathy POA: Yes    Medically noncompliant POA: Unknown  Resolved Problems:    Pericardial effusion POA: Unknown    Toxic encephalopathy POA: Unknown    Cystitis POA: Yes      FOLLOW UP  Future Appointments   Date Time Provider Department Center   7/25/2022  5:00 PM [facility] 1 [facility]   7/26/2022  7:20 AM JANES Cisneros     UROLOGY [facility]  [address] [address]  [phone]  Schedule an appointment as soon as possible for a visit in 1 week(s)        MEDICATIONS ON DISCHARGE     Medication List      START taking these medications      Instructions   oxybutynin 5 MG Tabs  Commonly known as: DITROPAN   Take 1 Tablet by mouth 3 times a day for 30 days.  Dose: 5 mg        CONTINUE taking these medications      Instructions   dapagliflozin propanediol 10 MG Tabs  Commonly known as: Farxiga   Take 5 mg by mouth every day.  Dose: 5 mg     finasteride 5 MG Tabs  Commonly known as: PROSCAR   Take 1 Tablet by mouth every day.  Dose: 5 mg     glucose blood strip   1 Strip by Other route as needed.  Dose: 1 Each     hydroxyurea 500 MG Caps  Commonly known as: HYDREA   Take 500 mg by mouth 2 Times a Day.  Dose: 500 mg    "  nitroglycerin 0.4 MG Subl  Commonly known as: NITROSTAT   Place 1 Tablet under the tongue as needed for Chest Pain (or hypertension >180/110).  Dose: 0.4 mg     spironolactone 25 MG Tabs  Commonly known as: ALDACTONE   Take 12.5 mg by mouth every day.  Dose: 12.5 mg     sucralfate 1 GM Tabs  Commonly known as: CARAFATE   Take 1 g by mouth 4 times a day.  Dose: 1 g     tamsulosin 0.4 MG capsule  Commonly known as: FLOMAX   Take 1 Capsule by mouth every day.  Dose: 0.4 mg        STOP taking these medications    ciprofloxacin 500 MG Tabs  Commonly known as: CIPRO            Allergies  Allergies   Allergen Reactions   • Doxycycline      Swelling lips and difficulties swallowing   • Atorvastatin      Pt and pts wife are not sure what happens    • Beta Adrenergic Blockers Unspecified     dizziness   • Eplerenone Unspecified     Intolerance, dehydration, altered mental status   • Metformin Unspecified and Palpitations     Cardiac effects  \"Similar to a heart attack, I was hospitalized\"   • Simvastatin      Other reaction(s): Other (Specify with Comments)  Myalgias  Myalgias   • Spironolactone Palpitations   • Statins [Hmg-Coa-R Inhibitors]      Muscle ache, joint pain       DIET  Orders Placed This Encounter   Procedures   • Diet Order Diet: 2 Gram Sodium; Second Modifier: (optional): Consistent CHO (Diabetic)     Standing Status:   Standing     Number of Occurrences:   1     Order Specific Question:   Diet:     Answer:   2 Gram Sodium [7]     Order Specific Question:   Second Modifier: (optional)     Answer:   Consistent CHO (Diabetic) [4]       ACTIVITY  As tolerated.  Weight bearing as tolerated    CONSULTATIONS  Urology     PROCEDURES  None    LABORATORY  Lab Results   Component Value Date    SODIUM 138 07/07/2022    POTASSIUM 3.6 07/07/2022    CHLORIDE 106 07/07/2022    CO2 21 07/07/2022    GLUCOSE 154 (H) 07/07/2022    BUN 17 07/07/2022    CREATININE 0.82 07/07/2022        Lab Results   Component Value Date    WBC " 4.2 (L) 07/07/2022    HEMOGLOBIN 14.5 07/07/2022    HEMATOCRIT 43.3 07/07/2022    PLATELETCT 226 07/07/2022        Total time of the discharge process exceeds 35 minutes.

## 2022-07-13 ENCOUNTER — APPOINTMENT (OUTPATIENT)
Dept: RADIOLOGY | Facility: MEDICAL CENTER | Age: 72
End: 2022-07-13
Payer: MEDICARE

## 2022-07-13 ENCOUNTER — HOSPITAL ENCOUNTER (EMERGENCY)
Facility: MEDICAL CENTER | Age: 72
End: 2022-07-13
Attending: EMERGENCY MEDICINE
Payer: MEDICARE

## 2022-07-13 ENCOUNTER — APPOINTMENT (OUTPATIENT)
Dept: RADIOLOGY | Facility: MEDICAL CENTER | Age: 72
End: 2022-07-13
Attending: EMERGENCY MEDICINE
Payer: MEDICARE

## 2022-07-13 VITALS
BODY MASS INDEX: 21.08 KG/M2 | WEIGHT: 169.53 LBS | HEART RATE: 94 BPM | TEMPERATURE: 98.6 F | HEIGHT: 75 IN | SYSTOLIC BLOOD PRESSURE: 144 MMHG | RESPIRATION RATE: 13 BRPM | OXYGEN SATURATION: 97 % | DIASTOLIC BLOOD PRESSURE: 87 MMHG

## 2022-07-13 DIAGNOSIS — R07.9 CHEST PAIN, UNSPECIFIED TYPE: ICD-10-CM

## 2022-07-13 DIAGNOSIS — R33.9 URINARY RETENTION: ICD-10-CM

## 2022-07-13 DIAGNOSIS — R31.0 GROSS HEMATURIA: ICD-10-CM

## 2022-07-13 LAB
ALBUMIN SERPL BCP-MCNC: 3.5 G/DL (ref 3.2–4.9)
ALBUMIN/GLOB SERPL: 0.8 G/DL
ALP SERPL-CCNC: 67 U/L (ref 30–99)
ALT SERPL-CCNC: 11 U/L (ref 2–50)
ANION GAP SERPL CALC-SCNC: 15 MMOL/L (ref 7–16)
ANISOCYTOSIS BLD QL SMEAR: ABNORMAL
APPEARANCE UR: ABNORMAL
AST SERPL-CCNC: 14 U/L (ref 12–45)
BACTERIA #/AREA URNS HPF: NEGATIVE /HPF
BASOPHILS # BLD AUTO: 0.7 % (ref 0–1.8)
BASOPHILS # BLD: 0.04 K/UL (ref 0–0.12)
BILIRUB SERPL-MCNC: 0.6 MG/DL (ref 0.1–1.5)
BILIRUB UR QL STRIP.AUTO: NEGATIVE
BUN SERPL-MCNC: 14 MG/DL (ref 8–22)
CALCIUM SERPL-MCNC: 9.5 MG/DL (ref 8.4–10.2)
CHLORIDE SERPL-SCNC: 98 MMOL/L (ref 96–112)
CO2 SERPL-SCNC: 19 MMOL/L (ref 20–33)
COLOR UR: ABNORMAL
COMMENT 1642: NORMAL
CREAT SERPL-MCNC: 1.1 MG/DL (ref 0.5–1.4)
EKG IMPRESSION: NORMAL
EOSINOPHIL # BLD AUTO: 0.05 K/UL (ref 0–0.51)
EOSINOPHIL NFR BLD: 0.8 % (ref 0–6.9)
EPI CELLS #/AREA URNS HPF: ABNORMAL /HPF
ERYTHROCYTE [DISTWIDTH] IN BLOOD BY AUTOMATED COUNT: 66.6 FL (ref 35.9–50)
GFR SERPLBLD CREATININE-BSD FMLA CKD-EPI: 72 ML/MIN/1.73 M 2
GLOBULIN SER CALC-MCNC: 4.5 G/DL (ref 1.9–3.5)
GLUCOSE SERPL-MCNC: 191 MG/DL (ref 65–99)
GLUCOSE UR STRIP.AUTO-MCNC: 100 MG/DL
HCT VFR BLD AUTO: 42.2 % (ref 42–52)
HGB BLD-MCNC: 14.4 G/DL (ref 14–18)
IMM GRANULOCYTES # BLD AUTO: 0.06 K/UL (ref 0–0.11)
IMM GRANULOCYTES NFR BLD AUTO: 1 % (ref 0–0.9)
KETONES UR STRIP.AUTO-MCNC: NEGATIVE MG/DL
LEUKOCYTE ESTERASE UR QL STRIP.AUTO: ABNORMAL
LYMPHOCYTES # BLD AUTO: 0.52 K/UL (ref 1–4.8)
LYMPHOCYTES NFR BLD: 8.8 % (ref 22–41)
MACROCYTES BLD QL SMEAR: ABNORMAL
MCH RBC QN AUTO: 36.5 PG (ref 27–33)
MCHC RBC AUTO-ENTMCNC: 34.1 G/DL (ref 33.7–35.3)
MCV RBC AUTO: 107.1 FL (ref 81.4–97.8)
MICRO URNS: ABNORMAL
MONOCYTES # BLD AUTO: 0.41 K/UL (ref 0–0.85)
MONOCYTES NFR BLD AUTO: 7 % (ref 0–13.4)
NEUTROPHILS # BLD AUTO: 4.81 K/UL (ref 1.82–7.42)
NEUTROPHILS NFR BLD: 81.7 % (ref 44–72)
NITRITE UR QL STRIP.AUTO: NEGATIVE
NRBC # BLD AUTO: 0 K/UL
NRBC BLD-RTO: 0 /100 WBC
PH UR STRIP.AUTO: 7 [PH] (ref 5–8)
PLATELET # BLD AUTO: 314 K/UL (ref 164–446)
PLATELET BLD QL SMEAR: NORMAL
PMV BLD AUTO: 10.2 FL (ref 9–12.9)
POTASSIUM SERPL-SCNC: 4 MMOL/L (ref 3.6–5.5)
PROT SERPL-MCNC: 8 G/DL (ref 6–8.2)
PROT UR QL STRIP: 100 MG/DL
RBC # BLD AUTO: 3.94 M/UL (ref 4.7–6.1)
RBC # URNS HPF: ABNORMAL /HPF
RBC BLD AUTO: PRESENT
RBC UR QL AUTO: ABNORMAL
SODIUM SERPL-SCNC: 132 MMOL/L (ref 135–145)
SP GR UR STRIP.AUTO: <=1.005
TROPONIN T SERPL-MCNC: 15 NG/L (ref 6–19)
WBC # BLD AUTO: 5.9 K/UL (ref 4.8–10.8)
WBC #/AREA URNS HPF: ABNORMAL /HPF
YEAST #/AREA URNS HPF: ABNORMAL /HPF
YEAST BUDDING URNS QL: PRESENT /HPF

## 2022-07-13 PROCEDURE — 96374 THER/PROPH/DIAG INJ IV PUSH: CPT

## 2022-07-13 PROCEDURE — 303105 HCHG CATHETER EXTRA

## 2022-07-13 PROCEDURE — 51798 US URINE CAPACITY MEASURE: CPT

## 2022-07-13 PROCEDURE — 80053 COMPREHEN METABOLIC PANEL: CPT

## 2022-07-13 PROCEDURE — 85025 COMPLETE CBC W/AUTO DIFF WBC: CPT

## 2022-07-13 PROCEDURE — 71045 X-RAY EXAM CHEST 1 VIEW: CPT

## 2022-07-13 PROCEDURE — 36415 COLL VENOUS BLD VENIPUNCTURE: CPT

## 2022-07-13 PROCEDURE — 700111 HCHG RX REV CODE 636 W/ 250 OVERRIDE (IP): Performed by: EMERGENCY MEDICINE

## 2022-07-13 PROCEDURE — 99285 EMERGENCY DEPT VISIT HI MDM: CPT

## 2022-07-13 PROCEDURE — 81001 URINALYSIS AUTO W/SCOPE: CPT

## 2022-07-13 PROCEDURE — 51702 INSERT TEMP BLADDER CATH: CPT

## 2022-07-13 PROCEDURE — 84484 ASSAY OF TROPONIN QUANT: CPT

## 2022-07-13 PROCEDURE — 93005 ELECTROCARDIOGRAM TRACING: CPT

## 2022-07-13 PROCEDURE — 93005 ELECTROCARDIOGRAM TRACING: CPT | Mod: XE | Performed by: EMERGENCY MEDICINE

## 2022-07-13 RX ORDER — HALOPERIDOL 5 MG/ML
2.5 INJECTION INTRAMUSCULAR ONCE
Status: COMPLETED | OUTPATIENT
Start: 2022-07-13 | End: 2022-07-13

## 2022-07-13 RX ADMIN — HALOPERIDOL LACTATE 2.5 MG: 5 INJECTION, SOLUTION INTRAMUSCULAR at 20:24

## 2022-07-13 ASSESSMENT — FIBROSIS 4 INDEX: FIB4 SCORE: 1.22

## 2022-07-13 ASSESSMENT — LIFESTYLE VARIABLES
CONSUMPTION TOTAL: NEGATIVE
EVER HAD A DRINK FIRST THING IN THE MORNING TO STEADY YOUR NERVES TO GET RID OF A HANGOVER: NO
TOTAL SCORE: 0
AVERAGE NUMBER OF DAYS PER WEEK YOU HAVE A DRINK CONTAINING ALCOHOL: 2
EVER FELT BAD OR GUILTY ABOUT YOUR DRINKING: NO
HAVE PEOPLE ANNOYED YOU BY CRITICIZING YOUR DRINKING: NO
ON A TYPICAL DAY WHEN YOU DRINK ALCOHOL HOW MANY DRINKS DO YOU HAVE: 1
TOTAL SCORE: 0
HAVE YOU EVER FELT YOU SHOULD CUT DOWN ON YOUR DRINKING: NO
DO YOU DRINK ALCOHOL: YES
TOTAL SCORE: 0
HOW MANY TIMES IN THE PAST YEAR HAVE YOU HAD 5 OR MORE DRINKS IN A DAY: 0

## 2022-07-13 ASSESSMENT — PAIN DESCRIPTION - PAIN TYPE: TYPE: ACUTE PAIN

## 2022-07-14 NOTE — ED NOTES
D/C instructions provided on go cath care, provided supplies to care for go.  VSS.  NAD.  Pt picked up by wife.

## 2022-07-14 NOTE — ED PROVIDER NOTES
"ED Provider Note    CHIEF COMPLAINT  Chief Complaint   Patient presents with   • Blood in Urine     Reports that he self cathed at home, and \"it triple filled with blood\" since this weekend. Reports known prostate issues.    • N/V   • Chest Pain     \"I have left heart pain where the failure is\". Reports dizziness and SOB as well.         HPI    Primary care provider: Tala Dunne M.D.   History obtained from: Patient  History limited by: None     Francesco Schulte is a 71 y.o. male who presents to the ED complaining of inability to urinate.  Patient is requesting Andres catheter placement.  He states that he try to self cath at home but it was just blood and he has been on to urinate and has significant lower abdominal pain.  Patient is also reporting chest pain to the left side of his chest.  He states he gets chest pain \"whenever I have pain\" and the chest pain has been present \"since I got heart failure 2 or 3 years ago.\"  He denies fever/chills/cough/nausea/vomiting/diarrhea/constipation/rash.  He reports feeling short of breath.  He denies recent travels or known ill contacts.  He has been vaccinated for COVID-19.    REVIEW OF SYSTEMS  Please see HPI for pertinent positives/negatives.  All other systems reviewed and are negative.     PAST MEDICAL HISTORY  Past Medical History:   Diagnosis Date   • 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   • Lupus (HCC) 11/15/2019   • Orthostatic hypotension 3/29/2018   • Heart attack (HCC) 03/2017   • Stroke ([facility]) 2016    r/t afib; eye issues resolved afterward   • Family history of punctured lung 2002    left lung   • Agent orange exposure    • Anesthesia     resistant to pain meds   • Cancer (HCC)     polycythemia vera   • Diabetes (HCC)     insulin and oral meds   • Hemorrhagic disorder (HCC)     on blood thinners   • High cholesterol    • Ischemic cardiomyopathy    • Kidney stones     hx of kidney stones   • Pain     headache pain   • Polycythemia " vera(238.4)    • Urinary bladder disorder     enlarged prostate, weak stream, difficulty urinating   • Vertigo         SURGICAL HISTORY  Past Surgical History:   Procedure Laterality Date   • PAMELA N/A 10/30/2021    Procedure: ECHOCARDIOGRAM, TRANSESOPHAGEAL;  Surgeon: Beau Gutierrez M.D.;  Location: [facility];  Service: Cardiac   • AICD IMPLANT  07/29/2021    [employer] D233 implanted by Dr. Isaac.   • SPLIT THICKNESS SKIN GRAFT N/A 8/23/2020    Procedure: APPLICATION, GRAFT, SKIN, SPLIT-THICKNESS;  Surgeon: Elisa Craig M.D.;  Location: [facility];  Service: Plastics   • SKIN ABSCESS INCISION AND DRAINAGE Right 8/3/2020    Procedure: INCISION AND DRAINAGE - SCROTAL WOUND - WOUND VAC PLACEMENT;  Surgeon: Jaylen Hayes M.D.;  Location: [facility];  Service: Urology   • OH EXPLORE SCROTUM Right 7/31/2020    Procedure: EXPLORATION, SCROTUM - FOR WASH OUT OF SCROTAL WOUND & WOUND VAC PLACEMENT;  Surgeon: Ferny Rosales M.D.;  Location: [facility];  Service: Urology   • OH EXPLORE SCROTUM Right 7/29/2020    Procedure: EXPLORATION, SCROTUM - FOR I & D OF SCROTAL AND  GROIN WOUND;  Surgeon: Donta Viera M.D.;  Location: [facility];  Service: Urology   • OH INCIS/DRAIN SCROTUM/TESTIS,EPIDIDYM Right 7/25/2020    Procedure: INCISION AND DRAINAGE, SCROTUM;  Surgeon: Nick Negro M.D.;  Location: [facility];  Service: Urology   • TEMPORAL ARTERY BIOPSY Right 6/26/2018    Procedure: TEMPORAL ARTERY BIOPSY;  Surgeon: Rajendra Aguilar M.D.;  Location: SURGERY SAME DAY [facility];  Service: General   • CAROTID ENDARTERECTOMY Right 3/21/2018    Procedure: CAROTID ENDARTERECTOMY- W/EEG MONITORING;  Surgeon: Benny Morfin M.D.;  Location: [facility];  Service: General   • APPENDECTOMY     • CATH PLACEMENT      right arm   • LAMINOTOMY      diskectomy; C4   • OTHER CARDIAC SURGERY      stents x 3        SOCIAL  "HISTORY  Social History     Tobacco Use   • Smoking status: Never Smoker   • Smokeless tobacco: Never Used   Vaping Use   • Vaping Use: Never used   Substance and Sexual Activity   • Alcohol use: No   • Drug use: No   • Sexual activity: Not on file        FAMILY HISTORY  Family History   Problem Relation Age of Onset   • Ovarian Cancer Neg Hx    • Diabetes Neg Hx         CURRENT MEDICATIONS  Home Medications    **Home medications have not yet been reviewed for this encounter**          ALLERGIES  Allergies   Allergen Reactions   • Doxycycline      Swelling lips and difficulties swallowing   • Atorvastatin      Pt and pts wife are not sure what happens    • Beta Adrenergic Blockers Unspecified     dizziness   • Eplerenone Unspecified     Intolerance, dehydration, altered mental status   • Metformin Unspecified and Palpitations     Cardiac effects  \"Similar to a heart attack, I was hospitalized\"   • Simvastatin      Other reaction(s): Other (Specify with Comments)  Myalgias  Myalgias   • Spironolactone Palpitations   • Statins [Hmg-Coa-R Inhibitors]      Muscle ache, joint pain        PHYSICAL EXAM  VITAL SIGNS: BP (!) 144/87   Pulse 94   Temp 37 °C (98.6 °F) (Temporal)   Resp 13   Ht 1.905 m (6' 3\")   Wt 76.9 kg (169 lb 8.5 oz)   SpO2 97%   BMI 21.19 kg/m²  @JAMEE[[id number]::@     Pulse ox interpretation: 98% I interpret this pulse ox as normal     Cardiac monitor interpretation: Sinus rhythm with heart rate in the 90s as interpreted by me.  The patient presented with chest pain and cardiac monitor was ordered to monitor for dysrhythmia.    Constitutional: Well developed, well nourished, alert in discomfort, nontoxic appearance    HENT: No external signs of trauma, normocephalic, oropharynx moist and clear, nose normal    Eyes: PERRL, conjunctiva without erythema, no discharge, no icterus    Neck: Soft and supple, trachea midline, no stridor, no tenderness, no LAD, no JVD, good ROM    Cardiovascular: Regular rate " and rhythm, no murmurs/rubs/gallops, strong distal pulses and good perfusion    Thorax & Lungs: No respiratory distress, CTAB   Abdomen: Soft, tenderness across lower abdomen, nondistended, no guarding, no rebound, normal BS    Back: No CVAT    Extremities: No cyanosis, no edema, no gross deformity, good ROM, no tenderness, intact distal pulses with brisk cap refill    Skin: Warm, dry, no pallor/cyanosis, no rash noted    Lymphatic: No lymphadenopathy noted    Neuro: A/O times 3, no focal deficits noted    Psychiatric: Cooperative, anxious patient      DIAGNOSTIC STUDIES / PROCEDURES    EKG  12 Lead EKG obtained at 1827 and interpreted by me:   Rate: 107   Rhythm: Sinus tachycardia  Ectopy: None  Intervals: Normal   Axis: LAD  QRS: Late precordial R wave transition  ST segments: Minimal ST depression lateral leads  T Waves: T wave inversion lateral leads    Clinical Impression: Sinus tachycardia with nonspecific ST-T wave changes  Compared to July 6, 2022 without significant change      LABS  All labs reviewed by me.     Results for orders placed or performed during the hospital encounter of 07/13/22   CBC with Differential   Result Value Ref Range    WBC 5.9 4.8 - 10.8 K/uL    RBC 3.94 (L) 4.70 - 6.10 M/uL    Hemoglobin 14.4 14.0 - 18.0 g/dL    Hematocrit 42.2 42.0 - 52.0 %    .1 (H) 81.4 - 97.8 fL    MCH 36.5 (H) 27.0 - 33.0 pg    MCHC 34.1 33.7 - 35.3 g/dL    RDW 66.6 (H) 35.9 - 50.0 fL    Platelet Count 314 164 - 446 K/uL    MPV 10.2 9.0 - 12.9 fL    Neutrophils-Polys 81.70 (H) 44.00 - 72.00 %    Lymphocytes 8.80 (L) 22.00 - 41.00 %    Monocytes 7.00 0.00 - 13.40 %    Eosinophils 0.80 0.00 - 6.90 %    Basophils 0.70 0.00 - 1.80 %    Immature Granulocytes 1.00 (H) 0.00 - 0.90 %    Nucleated RBC 0.00 /100 WBC    Neutrophils (Absolute) 4.81 1.82 - 7.42 K/uL    Lymphs (Absolute) 0.52 (L) 1.00 - 4.80 K/uL    Monos (Absolute) 0.41 0.00 - 0.85 K/uL    Eos (Absolute) 0.05 0.00 - 0.51 K/uL    Baso (Absolute) 0.04  0.00 - 0.12 K/uL    Immature Granulocytes (abs) 0.06 0.00 - 0.11 K/uL    NRBC (Absolute) 0.00 K/uL    Anisocytosis 1+     Macrocytosis 1+    Complete Metabolic Panel (CMP)   Result Value Ref Range    Sodium 132 (L) 135 - 145 mmol/L    Potassium 4.0 3.6 - 5.5 mmol/L    Chloride 98 96 - 112 mmol/L    Co2 19 (L) 20 - 33 mmol/L    Anion Gap 15.0 7.0 - 16.0    Glucose 191 (H) 65 - 99 mg/dL    Bun 14 8 - 22 mg/dL    Creatinine 1.10 0.50 - 1.40 mg/dL    Calcium 9.5 8.4 - 10.2 mg/dL    AST(SGOT) 14 12 - 45 U/L    ALT(SGPT) 11 2 - 50 U/L    Alkaline Phosphatase 67 30 - 99 U/L    Total Bilirubin 0.6 0.1 - 1.5 mg/dL    Albumin 3.5 3.2 - 4.9 g/dL    Total Protein 8.0 6.0 - 8.2 g/dL    Globulin 4.5 (H) 1.9 - 3.5 g/dL    A-G Ratio 0.8 g/dL   Troponin   Result Value Ref Range    Troponin T 15 6 - 19 ng/L   ESTIMATED GFR   Result Value Ref Range    GFR (CKD-EPI) 72 >60 mL/min/1.73 m 2   PLATELET ESTIMATE   Result Value Ref Range    Plt Estimation Normal    MORPHOLOGY   Result Value Ref Range    RBC Morphology Present    DIFFERENTIAL COMMENT   Result Value Ref Range    Comments-Diff see below    URINALYSIS (UA)    Specimen: Urine, Cath   Result Value Ref Range    Color Red (A)     Character Hazy (A)     Specific Gravity <=1.005 <1.035    Ph 7.0 5.0 - 8.0    Glucose 100 (A) Negative mg/dL    Ketones Negative Negative mg/dL    Protein 100 (A) Negative mg/dL    Bilirubin Negative Negative    Nitrite Negative Negative    Leukocyte Esterase Large (A) Negative    Occult Blood Moderate (A) Negative    Micro Urine Req Microscopic    URINE MICROSCOPIC (W/UA)   Result Value Ref Range    WBC 10-20 (A) /hpf    RBC 5-10 (A) /hpf    Bacteria Negative None /hpf    Epithelial Cells Few Few /hpf    Yeast Cells Few (A) None /hpf    Budding Yeast Present (A) Absent /hpf   EKG   Result Value Ref Range    Report       [facility] Emergency Dept.    Test Date:  2022-07-13  Pt Name:    MARZENA ROMEO             Department:  [facility]  MRN:        [id number]                      Room:  Gender:     Male                         Technician: GREGORIO  :        1950                   Requested By:ER TRIAGE PROTOCOL  Order #:    [id number]                    Reading MD:    Measurements  Intervals                                Axis  Rate:       107                          P:          37  CT:         152                          QRS:        -36  QRSD:       104                          T:          119  QT:         360  QTc:        481    Interpretive Statements  SINUS TACHYCARDIA  LEFT AXIS DEVIATION  ANTERIOR INFARCT, AGE INDETERMINATE  BORDERLINE PROLONGED QT INTERVAL  Compared to ECG 2022 15:49:23  Left-axis deviation now present  Sinus rhythm no longer present  Left anterior fascicular block no longer present  Left ventricular hypertrophy no longer present  Early repolarization  no longer present  Myocardial infarct finding still present          RADIOLOGY  The radiologist's interpretation of all radiological studies have been reviewed by me.     DX-CHEST-PORTABLE (1 VIEW)   Final Result      No radiographic evidence of acute cardiopulmonary process.             COURSE & MEDICAL DECISION MAKING  Nursing notes, VS, PMSFHx reviewed in chart.     Review of past medical records shows the patient was last admitted to this hospital 2022 for abdominal and chest pain and painful urination and discharged on 2022.      Differential diagnoses considered include but are not limited to: UTI/cystitis/pyelo, prostatitis, urinary retention, AMI, pneumothorax, pneumomediastinum, CHF/pulm edema, pericardial effusion/tamponade, myocarditis, pericarditis, pneumonia, pleural effusion, pleurisy, costochondritis, esophageal spasm, GERD, gastritis, PUD, hiatal hernia, pancreatitis, muscle strain, neuropathy      Pt risk-stratified as intermediate risk for MACE in the next 6 weeks by HEART Score: 5    HISTORY  Highly suspicious +2  Moderately  suspicious +1  Slightly suspicious 0    EKG  Significant ST depression +2  Nonspecific repolarization disturbance +1  Normal 0    AGE  ? 65 +2  45-65 +1  < 45 0    RISK FACTORS  Hypercholesterolemia, HTN, DM, Cigarette smoking, positive family history, obesity  ? 3 risk factors or history of atherosclerotic disease +2  1-2 risk factors +1  No risk factors known 0    TROPONIN  ? 3× normal limit +2  1-3× normal limit +1  ? normal limit 0      History and physical exam as above.  Patient presents to the ED complaining of blood in the urine and inability to urinate and does have history of enlarged prostate.  Andres catheter was placed with significant urine output suggesting urinary retention.  Findings on the UA are likely due to colonization rather than acute UTI.  Other lab abnormalities appear chronic and fairly stable compared to recent results.  His EKG without significant change compared to prior and troponin is negative.  Despite intermediate HEART score, I have low clinical suspicion for ACS given that he has had prolonged constant chest pain without increasing troponin.  His chest x-ray without evidence for acute abnormality.  He received a low dose of Haldol for pain.  I discussed the findings with the patient.  He reports feeling much better and also is noted to be much more comfortable and in no acute distress and nontoxic in appearance.  Repeat abdominal exam is benign.  He does not have any evidence for worrisome dysrhythmia in the ED.  He will be discharged home with Andres catheter in place with a leg bag and given instructions on its use.  He was advised on outpatient follow-up with urology and primary care provider.  Return to ED precautions were given.  Patient also had incidental elevated blood pressure reading in the ED and can follow-up on outpatient basis for further management.  Patient verbalized understanding and agreed the plan of care with no further questions or concerns.        The patient  is referred to a primary physician for blood pressure management, diabetic screening, and for all other preventative health concerns.       FINAL IMPRESSION  1. Urinary retention Acute   2. Gross hematuria Active   3. Chest pain, unspecified type Active          DISPOSITION  Patient will be discharged home in stable condition.       FOLLOW UP  Tala Dunne M.D.  [address]  [address]  [place] [id number] [phone]    Call in 1 day      Adin Jackson M.D.  [address]  [place] [address]  [phone]    Call in 1 day      [facility], Emergency Dept  [address]  [place] [id number] [phone]    If symptoms worsen         OUTPATIENT MEDICATIONS  Discharge Medication List as of 7/13/2022 10:57 PM             Electronically signed by: Marciano Rizvi D.O., 7/13/2022 7:47 PM      Portions of this record were made with voice recognition software.  Despite my review, spelling/grammar/context errors may still remain.  Interpretation of this chart should be taken in this context.

## 2022-07-25 ENCOUNTER — HOSPITAL ENCOUNTER (OUTPATIENT)
Dept: RADIOLOGY | Facility: MEDICAL CENTER | Age: 72
End: 2022-07-25
Attending: INTERNAL MEDICINE
Payer: MEDICARE

## 2022-07-25 DIAGNOSIS — N30.01 ACUTE CYSTITIS WITH HEMATURIA: ICD-10-CM

## 2022-07-25 PROCEDURE — 76775 US EXAM ABDO BACK WALL LIM: CPT

## 2022-07-26 ENCOUNTER — APPOINTMENT (OUTPATIENT)
Dept: MEDICAL GROUP | Facility: MEDICAL CENTER | Age: 72
End: 2022-07-26
Payer: MEDICARE

## 2022-07-26 ENCOUNTER — OFFICE VISIT (OUTPATIENT)
Dept: MEDICAL GROUP | Facility: MEDICAL CENTER | Age: 72
End: 2022-07-26

## 2022-07-26 VITALS
BODY MASS INDEX: 20.56 KG/M2 | SYSTOLIC BLOOD PRESSURE: 114 MMHG | TEMPERATURE: 96.8 F | HEIGHT: 75 IN | DIASTOLIC BLOOD PRESSURE: 78 MMHG | OXYGEN SATURATION: 99 % | HEART RATE: 104 BPM | WEIGHT: 165.34 LBS

## 2022-07-26 DIAGNOSIS — E11.9 TYPE 2 DIABETES MELLITUS WITHOUT COMPLICATION, WITH LONG-TERM CURRENT USE OF INSULIN (HCC): ICD-10-CM

## 2022-07-26 DIAGNOSIS — R33.8 BENIGN PROSTATIC HYPERPLASIA WITH URINARY RETENTION: ICD-10-CM

## 2022-07-26 DIAGNOSIS — N40.1 BENIGN PROSTATIC HYPERPLASIA WITH URINARY RETENTION: ICD-10-CM

## 2022-07-26 DIAGNOSIS — Z79.4 TYPE 2 DIABETES MELLITUS WITHOUT COMPLICATION, WITH LONG-TERM CURRENT USE OF INSULIN (HCC): ICD-10-CM

## 2022-07-26 DIAGNOSIS — B02.9 HERPES ZOSTER WITHOUT COMPLICATION: ICD-10-CM

## 2022-07-26 PROCEDURE — 99214 OFFICE O/P EST MOD 30 MIN: CPT | Performed by: INTERNAL MEDICINE

## 2022-07-26 RX ORDER — MYCOPHENOLATE MOFETIL 500 MG/1
TABLET ORAL
COMMUNITY
End: 2022-07-26

## 2022-07-26 RX ORDER — DIGOXIN 125 MCG
TABLET ORAL
COMMUNITY
End: 2022-07-26

## 2022-07-26 RX ORDER — ACYCLOVIR 800 MG/1
800 TABLET ORAL DAILY
Qty: 7 TABLET | Refills: 0 | Status: ON HOLD | OUTPATIENT
Start: 2022-07-26 | End: 2022-08-22

## 2022-07-26 RX ORDER — INSULIN DEGLUDEC 200 U/ML
12 INJECTION, SOLUTION SUBCUTANEOUS
COMMUNITY
End: 2022-08-09 | Stop reason: SDUPTHER

## 2022-07-26 RX ORDER — MIDODRINE HYDROCHLORIDE 10 MG/1
TABLET ORAL
COMMUNITY
End: 2022-07-26

## 2022-07-26 RX ORDER — GABAPENTIN 100 MG/1
100 CAPSULE ORAL 3 TIMES DAILY
Qty: 42 CAPSULE | Refills: 0 | Status: SHIPPED | OUTPATIENT
Start: 2022-07-26 | End: 2022-08-09

## 2022-07-26 RX ORDER — DICYCLOMINE HYDROCHLORIDE 10 MG/1
10 CAPSULE ORAL EVERY 6 HOURS PRN
COMMUNITY
End: 2023-03-14 | Stop reason: SDUPTHER

## 2022-07-26 RX ORDER — FINASTERIDE 5 MG/1
TABLET, FILM COATED ORAL
COMMUNITY
End: 2022-07-26

## 2022-07-26 RX ORDER — LISINOPRIL 5 MG/1
TABLET ORAL
COMMUNITY
End: 2022-07-26

## 2022-07-26 RX ORDER — AMOXICILLIN AND CLAVULANATE POTASSIUM 875; 125 MG/1; MG/1
TABLET, FILM COATED ORAL
COMMUNITY
End: 2022-07-26

## 2022-07-26 RX ORDER — CIPROFLOXACIN 500 MG/1
TABLET, FILM COATED ORAL
COMMUNITY
End: 2022-07-26

## 2022-07-26 RX ORDER — HYDROXYZINE HYDROCHLORIDE 25 MG/1
TABLET, FILM COATED ORAL
COMMUNITY
End: 2022-07-26

## 2022-07-26 RX ORDER — PREDNISONE 20 MG/1
TABLET ORAL
COMMUNITY
End: 2022-07-26

## 2022-07-26 RX ORDER — NAPROXEN 250 MG/1
TABLET ORAL
Status: ON HOLD | COMMUNITY
End: 2022-08-19

## 2022-07-26 RX ORDER — EPLERENONE 25 MG/1
TABLET, FILM COATED ORAL
COMMUNITY
End: 2022-07-26

## 2022-07-26 RX ORDER — MIRTAZAPINE 15 MG/1
TABLET, FILM COATED ORAL
COMMUNITY
End: 2022-07-26

## 2022-07-26 RX ORDER — CEPHALEXIN 500 MG/1
CAPSULE ORAL
COMMUNITY
End: 2022-07-26

## 2022-07-26 RX ORDER — METOPROLOL SUCCINATE 25 MG/1
TABLET, EXTENDED RELEASE ORAL
COMMUNITY
End: 2022-07-26

## 2022-07-26 RX ORDER — LISINOPRIL 2.5 MG/1
TABLET ORAL
COMMUNITY
End: 2022-07-26

## 2022-07-26 ASSESSMENT — ENCOUNTER SYMPTOMS
VOMITING: 0
CHILLS: 0
NAUSEA: 0
SORE THROAT: 0
FLANK PAIN: 0
SPEECH CHANGE: 0
SHORTNESS OF BREATH: 0
DEPRESSION: 0
FEVER: 0
FOCAL WEAKNESS: 0
COUGH: 0

## 2022-07-26 ASSESSMENT — FIBROSIS 4 INDEX: FIB4 SCORE: 0.93

## 2022-07-26 NOTE — NON-PROVIDER
"CHIEF COMPLAINT  Chief Complaint   Patient presents with   • Follow-Up       HPI  Francesco Schulte is a 71 y.o. male who presents to the clinic for follow up on his renal ultrasound.    Rash  The patient reported right sided lower back pain that started two weeks ago. He states that the pain is similar to what he had felt in the past with a kidney stone, but this is more of a burning sensation and a lot more severe. He states that he has been taking old oxycodone that he was prescribed for nephrolithiasis pain management, and that has help. He has also tried to use a vibrating massager on the area, but that has not helped his symptoms. The patient has never had the shingrix vaccine as he was told \"it would kill me\".    Ultrasound Follow up  The patient was seen in the emergency department on 7/13 for blood in his urine. The patient was also having difficulty voiding at that time. A go catheter was placed and since that time his retention and urine changes have improved. The urine is not completely back to his baseline, but it is continually improving. He had an ultrasound done yesterday of his kidney and bladder.    REVIEW OF SYSTEMS  Review of Systems   Respiratory: Negative for shortness of breath.    Cardiovascular: Negative for chest pain.   Genitourinary: Negative for dysuria and flank pain.        Discolored urine (improving toward baseline)   Skin: Positive for rash.        Rash with burning sensation on the right lower back.       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), Agent orange exposure, Anesthesia, Cancer ([facility]), Diabetes ([facility]), Family history of punctured lung (2002), Heart attack ([facility]) (03/2017), Hemorrhagic disorder ([facility]), High cholesterol, Ischemic cardiomyopathy, Kidney stones, Lupus ([facility]) (11/15/2019), Orthostatic hypotension (3/29/2018), Pain, Polycythemia vera(238.4), Stroke ([facility]) (2016), Urinary bladder disorder, and " Vertigo.    SURGICAL HISTORY   has a past surgical history that includes other cardiac surgery; cath placement; laminotomy; appendectomy; carotid endarterectomy (Right, 3/21/2018); temporal artery biopsy (Right, 6/26/2018); incis/drain scrotum/testis,epididym (Right, 7/25/2020); explore scrotum (Right, 7/29/2020); explore scrotum (Right, 7/31/2020); skin abscess incision and drainage (Right, 8/3/2020); split thickness skin graft (N/A, 8/23/2020); aicd implant (07/29/2021); and jacqueline (N/A, 10/30/2021).    FAMILY HISTORY  Family History   Problem Relation Age of Onset   • Ovarian Cancer Neg Hx    • Diabetes Neg Hx         SOCIAL HISTORY  Social History     Tobacco Use   • Smoking status: Never Smoker   • Smokeless tobacco: Never Used   Vaping Use   • Vaping Use: Never used   Substance and Sexual Activity   • Alcohol use: No   • Drug use: No   • Sexual activity: Not on file       CURRENT MEDICATIONS  Current Outpatient Medications   Medication Sig Dispense Refill   • acyclovir (ZOVIRAX) 800 MG Tab Take 1 Tablet by mouth every day. 7 Tablet 0   • gabapentin (NEURONTIN) 100 MG Cap Take 1 Capsule by mouth 3 times a day for 14 days. 42 Capsule 0   • apixaban (ELIQUIS) 5mg Tab Eliquis 5 mg tablet   TAKE 1 TABLET BY MOUTH TWICE A DAY     • dicyclomine (BENTYL) 10 MG Cap dicyclomine 10 mg capsule   TAKE 1 TABLET BY MOUTH EVERY 6 HOURS AS NEEDED FOR ABDOMINAL CRAMPS AND/OR DISCOMFORT     • Insulin Degludec (TRESIBA FLEXTOUCH) 200 UNIT/ML Solution Pen-injector Inject 12 Units under the skin.     • naproxen (NAPROSYN) 250 MG Tab naproxen 250 mg tablet   750 mg by oral route.     • glucose blood strip OneTouch Verio test strips   1 STRIP BY OTHER ROUTE AS NEEDED.     • oxybutynin (DITROPAN) 5 MG Tab Take 1 Tablet by mouth 3 times a day for 30 days. 90 Tablet 0   • dapagliflozin propanediol (FARXIGA) 10 MG Tab Take 5 mg by mouth every day.     • tamsulosin (FLOMAX) 0.4 MG capsule Take 1 Capsule by mouth every day. 90 Capsule 1   •  "glucose blood strip 1 Strip by Other route as needed. 100 Strip 1   • finasteride (PROSCAR) 5 MG Tab Take 1 Tablet by mouth every day. 90 Tablet 1   • nitroglycerin (NITROSTAT) 0.4 MG SL Tab Place 1 Tablet under the tongue as needed for Chest Pain (or hypertension >180/110). 25 Tablet 3   • hydroxyurea (HYDREA) 500 MG Cap Take 500 mg by mouth 2 Times a Day.       No current facility-administered medications for this visit.       ALLERGIES  Allergies   Allergen Reactions   • Doxycycline      Swelling lips and difficulties swallowing   • Atorvastatin      Pt and pts wife are not sure what happens    • Beta Adrenergic Blockers Unspecified     dizziness   • Eplerenone Unspecified     Intolerance, dehydration, altered mental status   • Metformin Unspecified and Palpitations     Cardiac effects  \"Similar to a heart attack, I was hospitalized\"   • Simvastatin      Other reaction(s): Other (Specify with Comments)  Myalgias  Myalgias   • Spironolactone Palpitations   • Statins [Hmg-Coa-R Inhibitors]      Muscle ache, joint pain       PHYSICAL EXAM  VITAL SIGNS: /78 (BP Location: Right arm, Patient Position: Sitting, BP Cuff Size: Adult)   Pulse (!) 104   Temp 36 °C (96.8 °F) (Temporal)   Ht 1.905 m (6' 3\")   Wt 75 kg (165 lb 5.5 oz)   SpO2 99%   BMI 20.67 kg/m²   Physical Exam  Vitals reviewed.   HENT:      Head: Normocephalic and atraumatic.   Cardiovascular:      Comments: Decreased heart sounds. Radial and DP pulses are 1+ bilaterally  Pulmonary:      Effort: Pulmonary effort is normal.      Breath sounds: Normal breath sounds. No wheezing.   Abdominal:      Comments: Slight tenderness to palpation in the suprapubic area   Genitourinary:     Comments: Andres catheter in place  Skin:     Capillary Refill: Capillary refill takes less than 2 seconds.      Comments: Maculopapular rash that follow an L2 dermatomal distribution on the right side.   Neurological:      Mental Status: He is alert.       Assessment & " Plan  Bob Schulte is a 71 year old male who reports to the clinic for new onset shingles and follow up on his renal ultrasound    Shingles  The patient has never had the shingles vaccine. He has been experiencing a burning sensation and has a maculopapular rash that follows the right L2 dermatomal distribution.  The patient was prescribed gabapentin 100mg tid for neurogenic pain and acyclovir 800mg qd for treatment of the zoster virus.  The patient understood and was agreeable to the plan.    Ultrasound Follow Up  The patient had a renal ultrasound completed yesterday. The results showed prostate enlargement (chronic and already being treated) and benign bilateral renal cysts. The results of the imaging study was discussed with the patient. The patient has been able to void his bladder since the placement of the go catheter and his urinary symptoms have improved.  He was encouraged to follow with his Urologist (as planned) for management of the go catheter and further evaluation/treatment.  The patient understood and is agreeable to the plan.     FINAL IMPRESSION  1. Herpes zoster without complication        PRESCRIPTIONS  New Prescriptions    ACYCLOVIR (ZOVIRAX) 800 MG TAB    Take 1 Tablet by mouth every day.    GABAPENTIN (NEURONTIN) 100 MG CAP    Take 1 Capsule by mouth 3 times a day for 14 days.

## 2022-07-27 NOTE — ASSESSMENT & PLAN NOTE
Patient tells me he is using Tresiba 10 units daily-advised to increase to 12 units daily.continue Farxiga  Possibly, patient has not filled his Tresiba since January?.  Provided patient with blood sugar log-he will record his blood glucose numbers and bring back to me in 2 weeks.   Will consider home health for DM management.

## 2022-07-27 NOTE — ASSESSMENT & PLAN NOTE
From hospital records review, it appears that patient might not have filled his finasteride and Flomax.  He still has a Go catheter, draining clear urine.     The patient had a renal ultrasound completed yesterday. The results showed prostate enlargement and benign bilateral renal cysts. The results of the imaging study was discussed with the patient. The patient has been able to void his bladder since the placement of the go catheter and his urinary symptoms have improved.   He was encouraged to follow with his Urologist (as planned) for management of the go catheter and further evaluation/treatment.  The patient understood and is agreeable to the plan.

## 2022-07-27 NOTE — PROGRESS NOTES
"Subjective:     Chief Complaint   Patient presents with   • Follow-Up     Diagnoses of Herpes zoster without complication, Type 2 diabetes mellitus without complication, with long-term current use of insulin (HCC), and Benign prostatic hyperplasia with urinary retention were pertinent to this visit.    HPI: Francesco is a pleasant 71 y.o. male who presents today to discuss results of renal ultrasound.  Coincidentally he has Herpes zoster infection.    Problem   Herpes Zoster    The patient reported right sided lower back pain that started two weeks ago. He states that the pain is similar to what he had felt in the past with a kidney stone, but this is more of a burning sensation and a lot more severe. He states that he has been taking old oxycodone that he was prescribed for nephrolithiasis pain management, and that has help. He has also tried to use a vibrating massager on the area, but that has not helped his symptoms. The patient has never had the shingrix vaccine as he was told \"it would kill me\".     Type 2 Diabetes Mellitus Without Complication, With Long-Term Current Use of Insulin (Hcc)    This is a chronic condition uncontrolled, managed by endocrinology Dr. Mckeon.  His current regimen includes Tresiba 10  units daily - advised to increase to 12 Units.  and Farxiga but was on hold due to urinary symptoms    Lab Results   Component Value Date/Time    HBA1C 8.4 (H) 07/06/2022 10:25 AM        Fasting sugars: 130s, 215-220 postprandial  Last diabetic foot exam: 07/26/2022  Last retinal eye exam: due, referred  ACEi/ARB: contraindicated due to orthostatic hypotension, on Farxyga   Statin: intolerant   Aspirin: no, on Eliquis  Concomitant HTN: no         Benign Prostatic Hyperplasia With Urinary Retention    The patient was seen in the emergency department on 7/13 for blood in his urine. The patient was also having difficulty voiding at that time. A go catheter was placed and since that time his retention " and urine changes have improved. The urine is not completely back to his baseline, but it is continually improving. He had an ultrasound done yesterday of his kidney and bladder, which showed bilateral renal cysts and enlarged prostate.         Past Medical History:   Diagnosis Date   • 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   • Agent orange exposure    • Anesthesia     resistant to pain meds   • Cancer ([facility])     polycythemia vera   • Diabetes ([facility])     insulin and oral meds   • Family history of punctured lung 2002    left lung   • Heart attack ([facility]) 03/2017   • Hemorrhagic disorder ([facility])     on blood thinners   • High cholesterol    • Ischemic cardiomyopathy    • Kidney stones     hx of kidney stones   • Lupus ([facility]) 11/15/2019   • Orthostatic hypotension 3/29/2018   • Pain     headache pain   • Polycythemia vera(238.4)    • Stroke ([facility]) 2016    r/t afib; eye issues resolved afterward   • Urinary bladder disorder     enlarged prostate, weak stream, difficulty urinating   • Vertigo        Current Outpatient Medications Ordered in Epic   Medication Sig Dispense Refill   • acyclovir (ZOVIRAX) 800 MG Tab Take 1 Tablet by mouth every day. 7 Tablet 0   • gabapentin (NEURONTIN) 100 MG Cap Take 1 Capsule by mouth 3 times a day for 14 days. 42 Capsule 0   • apixaban (ELIQUIS) 5mg Tab Eliquis 5 mg tablet   TAKE 1 TABLET BY MOUTH TWICE A DAY     • dicyclomine (BENTYL) 10 MG Cap dicyclomine 10 mg capsule   TAKE 1 TABLET BY MOUTH EVERY 6 HOURS AS NEEDED FOR ABDOMINAL CRAMPS AND/OR DISCOMFORT     • Insulin Degludec (TRESIBA FLEXTOUCH) 200 UNIT/ML Solution Pen-injector Inject 12 Units under the skin.     • naproxen (NAPROSYN) 250 MG Tab naproxen 250 mg tablet   750 mg by oral route.     • glucose blood strip OneTouch Verio test strips   1 STRIP BY OTHER ROUTE AS NEEDED.     • oxybutynin (DITROPAN) 5 MG Tab Take 1 Tablet by mouth 3 times a day for 30 days. 90 Tablet 0   • dapagliflozin propanediol (FARXIGA) 10 MG Tab Take 5 mg by  "mouth every day.     • tamsulosin (FLOMAX) 0.4 MG capsule Take 1 Capsule by mouth every day. 90 Capsule 1   • glucose blood strip 1 Strip by Other route as needed. 100 Strip 1   • finasteride (PROSCAR) 5 MG Tab Take 1 Tablet by mouth every day. 90 Tablet 1   • nitroglycerin (NITROSTAT) 0.4 MG SL Tab Place 1 Tablet under the tongue as needed for Chest Pain (or hypertension >180/110). 25 Tablet 3   • hydroxyurea (HYDREA) 500 MG Cap Take 500 mg by mouth 2 Times a Day.       No current [facility]-ordered facility-administered medications on file.     Health Maintenance: deferred to next visit     Review of Systems   Constitutional: Positive for malaise/fatigue. Negative for chills and fever.   HENT: Negative for sore throat.    Respiratory: Negative for cough and shortness of breath.    Cardiovascular: Negative for chest pain.   Gastrointestinal: Negative for nausea and vomiting.   Genitourinary: Negative for dysuria.        Andres in place   Skin: Positive for rash.   Neurological: Negative for speech change and focal weakness.   Psychiatric/Behavioral: Negative for depression.      Objective:     Exam:  /78 (BP Location: Right arm, Patient Position: Sitting, BP Cuff Size: Adult)   Pulse (!) 104   Temp 36 °C (96.8 °F) (Temporal)   Ht 1.905 m (6' 3\")   Wt 75 kg (165 lb 5.5 oz)   SpO2 99%   BMI 20.67 kg/m²  Body mass index is 20.67 kg/m².    Physical Exam  Vitals reviewed.   Constitutional:       General: He is not in acute distress.     Appearance: Normal appearance. He is normal weight. He is not ill-appearing or toxic-appearing.   HENT:      Head: Normocephalic and atraumatic.      Nose: Nose normal. No congestion or rhinorrhea.      Mouth/Throat:      Mouth: Mucous membranes are moist.      Pharynx: Oropharynx is clear. No oropharyngeal exudate.   Eyes:      General: No scleral icterus.     Pupils: Pupils are equal, round, and reactive to light.   Cardiovascular:      Rate and Rhythm: Normal rate and regular " rhythm.      Pulses: Normal pulses.      Heart sounds: Normal heart sounds.      Comments: Distant heart sounds  Pulmonary:      Effort: Pulmonary effort is normal. No respiratory distress.      Breath sounds: Normal breath sounds. No wheezing or rales.   Abdominal:      General: Abdomen is flat. There is no distension.      Tenderness: There is abdominal tenderness (slight suprapubic). There is no right CVA tenderness, left CVA tenderness, guarding or rebound.   Skin:            Comments: Herpetic rash following L2 dermatome.   Neurological:      General: No focal deficit present.      Mental Status: He is alert and oriented to person, place, and time.   Psychiatric:         Attention and Perception: He is attentive.         Mood and Affect: Mood and affect normal.       Labs: Reviewed CBC, CMP, troponin from 7/13/2022.  A1c from 7/6/2022    Assessment & Plan:   Francesco  is a pleasant 71 y.o. male with the following -     Problem List Items Addressed This Visit     Type 2 diabetes mellitus without complication, with long-term current use of insulin ([facility]) (Chronic)     Patient tells me he is using Tresiba 10 units daily-advised to increase to 12 units daily.continue Farxiga  Possibly, patient has not filled his Tresiba since January?.  Provided patient with blood sugar log-he will record his blood glucose numbers and bring back to me in 2 weeks.   Will consider home health for DM management.           Relevant Medications    Insulin Degludec (TRESIBA FLEXTOUCH) 200 UNIT/ML Solution Pen-injector    Other Relevant Orders    Diabetic Monofilament LE Exam (Completed)    Benign prostatic hyperplasia with urinary retention     From hospital records review, it appears that patient might not have filled his finasteride and Flomax.  He still has a Andres catheter, draining clear urine.     The patient had a renal ultrasound completed yesterday. The results showed prostate enlargement and benign bilateral renal cysts. The  results of the imaging study was discussed with the patient. The patient has been able to void his bladder since the placement of the go catheter and his urinary symptoms have improved.   He was encouraged to follow with his Urologist (as planned) for management of the go catheter and further evaluation/treatment.  The patient understood and is agreeable to the plan.            Herpes zoster     The patient has never had the shingles vaccine. He has been experiencing a burning sensation and has a maculopapular rash that follows the right L2 dermatomal distribution.  The patient was prescribed gabapentin 100mg tid for neurogenic pain and acyclovir 800mg qd for treatment of the zoster virus.  The patient understood and was agreeable to the plan.           Relevant Medications    acyclovir (ZOVIRAX) 800 MG Tab    gabapentin (NEURONTIN) 100 MG Cap        Return in about 2 weeks (around 8/9/2022), or if symptoms worsen or fail to improve.    Please note that this dictation was created using voice recognition software. I have made every reasonable attempt to correct obvious errors, but I expect that there are errors of grammar and possibly content that I did not discover before finalizing the note.

## 2022-07-27 NOTE — ASSESSMENT & PLAN NOTE
The patient has never had the shingles vaccine. He has been experiencing a burning sensation and has a maculopapular rash that follows the right L2 dermatomal distribution.  The patient was prescribed gabapentin 100mg tid for neurogenic pain and acyclovir 800mg qd for treatment of the zoster virus.  The patient understood and was agreeable to the plan.

## 2022-08-04 NOTE — PROGRESS NOTES
[facility] Medicine Daily Progress Note    Date of Service  5/31/2021    Chief Complaint  70 y.o. male admitted 5/28/2021 with nausea vomiting abdominal pain    Hospital Course  70M, PMHx C-S-HF, PAfib, SLE, BPH, remote history of diabetes  Admitted 5/28 with nausea, chest & abd pain   Stress test shows no reversible ischemia  G emptying inconsistent with gastroparesis  US shows Cholelithiasis, has inconsistent right upper quadrant tenderness I will check HIDA scan    Interval Problem Update  Hemodynamically stable overnight  Heart rate 50s-80s saturating well on room air.  G emptying inconsistent with gastroparesis  US shows Cholelithiasis, has inconsistent right upper quadrant tenderness   I will check HIDA scan  Discussed with patient, patient's nurse and with multidisciplinary team during rounds including , pharmacist and charge nurse.       Consultants/Specialty  None     Code Status  Full Code    Disposition  Anticipate medical clearance in 1 day     Review of Systems  Review of Systems   Constitutional: Positive for malaise/fatigue.   HENT: Negative for congestion.    Eyes: Negative for discharge and redness.   Respiratory: Negative for cough, sputum production, shortness of breath and stridor.    Cardiovascular: Negative for palpitations and leg swelling. Chest pain: Resolved.   Gastrointestinal: Positive for abdominal pain and nausea. Negative for constipation and diarrhea.   Genitourinary: Negative for dysuria and urgency.   Musculoskeletal: Negative for falls and myalgias.   Neurological: Positive for headaches (Improved). Negative for dizziness, tingling and loss of consciousness.   Psychiatric/Behavioral: Negative for depression and suicidal ideas.      Physical Exam  Temp:  [36.2 °C (97.1 °F)-37.1 °C (98.7 °F)] 36.5 °C (97.7 °F)  Pulse:  [54-81] 58  Resp:  [16-18] 18  BP: (128-150)/(63-83) 134/82  SpO2:  [96 %-100 %] 96 %    Physical Exam  Vitals and nursing note reviewed.   Constitutional:        General: He is not in acute distress.     Appearance: He is well-developed. He is not toxic-appearing or diaphoretic.   HENT:      Head: Normocephalic and atraumatic.      Right Ear: External ear normal.      Left Ear: External ear normal.      Nose: Nose normal. No congestion or rhinorrhea.      Mouth/Throat:      Mouth: Mucous membranes are dry.      Pharynx: No oropharyngeal exudate.   Eyes:      General:         Right eye: No discharge.         Left eye: No discharge.      Extraocular Movements: Extraocular movements intact.   Neck:      Trachea: No tracheal deviation.   Cardiovascular:      Rate and Rhythm: Regular rhythm. Bradycardia present.      Heart sounds: No murmur heard.   No friction rub. No gallop.    Pulmonary:      Effort: Pulmonary effort is normal.      Breath sounds: No stridor.   Abdominal:      General: Bowel sounds are normal. There is no distension.      Palpations: Abdomen is soft.      Tenderness: There is abdominal tenderness (Multiple quadrants, more prominently right upper quadrant, intermittent, negative Mirza) in the right lower quadrant and left lower quadrant.   Musculoskeletal:         General: Normal range of motion.      Cervical back: Normal range of motion and neck supple. No edema or erythema.      Right lower leg: Edema present.      Left lower leg: Edema present.   Lymphadenopathy:      Cervical: No cervical adenopathy.   Skin:     General: Skin is warm and dry.      Findings: No erythema or rash.   Neurological:      General: No focal deficit present.      Mental Status: He is alert and oriented to person, place, and time.      Cranial Nerves: No cranial nerve deficit.   Psychiatric:         Mood and Affect: Mood normal.       Fluids    Intake/Output Summary (Last 24 hours) at 5/31/2021 1444  Last data filed at 5/31/2021 0757  Gross per 24 hour   Intake 100 ml   Output 700 ml   Net -600 ml       Laboratory  Recent Labs     05/29/21  0032 05/30/21  0323 05/31/21  4781    WBC 6.1 5.3 4.6*   RBC 5.03 4.97 5.06   HEMOGLOBIN 15.0 15.0 15.0   HEMATOCRIT 47.4 47.6 47.2   MCV 94.2 95.8 93.3   MCH 29.8 30.2 29.6   MCHC 31.6* 31.5* 31.8*   RDW 64.5* 66.2* 62.5*   PLATELETCT 211 201 195   MPV 11.9 12.0 11.2     Recent Labs     05/29/21  0032 05/30/21  0323 05/31/21  0441   SODIUM 136 137 135   POTASSIUM 4.3 3.9 4.0   CHLORIDE 104 103 104   CO2 22 23 24   GLUCOSE 206* 140* 117*   BUN 19 17 14   CREATININE 0.97 0.96 0.95   CALCIUM 9.2 9.1 9.0     Recent Labs     05/28/21 2128   APTT 29.9   INR 1.31*               Imaging  NM-GASTRIC EMPTYING   Final Result      Normal gastric emptying scan.         US-ABDOMEN COMPLETE SURVEY   Final Result      1.  Hepatic steatosis.   2.  Cholelithiasis without sonographic evidence for acute cholecystitis.   3.  Splenomegaly.   4.  Prostatomegaly.            NM-CARDIAC STRESS TEST         EC-ECHOCARDIOGRAM LTD W/ CONT   Final Result      DX-CHEST-PORTABLE (1 VIEW)   Final Result      No radiographic evidence of acute cardiopulmonary process.           Assessment/Plan  * AP (abdominal pain)- (present on admission)  Assessment & Plan  Associated with nausea and vomiting    Differentials include gastroenteritis,  gastroparesis or biliary stones/dysfunction  Bowel rest, advance diet as tolerated   Gastric emptying is inconsistent with gastroparesis       US shows Cholelithiasis, has inconsistent right upper quadrant tenderness I will check HIDA scan      Chronic systolic heart failure (HCC)- (present on admission)  Assessment & Plan  With worsening over the last 2 weeks of fatigue  Intermittent chest pain  Obtain stress test as well as echocardiogram  Appears close to the dry side however has mild mild lower extremity edema  Monitor volume status closely    Paroxysmal A-fib (HCC)- (present on admission)  Assessment & Plan  Currently sinus on metoprolol and digoxin  Will obtain digoxin level  Continue home Eliquis      Hypertensive urgency- (present on  admission)  Assessment & Plan  Resume home metoprolol with hold parameters.  We will start labetalol for extreme hypertension    Type 2 diabetes mellitus with complication, without long-term current use of insulin ([facility])- (present on admission)  Assessment & Plan  With hyperglycemia  Last glycated hemoglobin was 7.1%  Short acting insulin  Accu-Checks, hypoglycemia protocol     Benign prostatic hyperplasia without lower urinary tract symptoms- (present on admission)  Assessment & Plan  Continue home Proscar and Flomax  Monitor input output, check bladder scans    SLE (systemic lupus erythematosus related syndrome) ([facility])- (present on admission)  Assessment & Plan  Resume home meds     Chest pain ([facility])- (present on admission)  Assessment & Plan  Stress test shows no evidence of reversible ischemia.  EF 19%     VTE prophylaxis: Apixaban   Yes

## 2022-08-09 ENCOUNTER — OFFICE VISIT (OUTPATIENT)
Dept: MEDICAL GROUP | Facility: MEDICAL CENTER | Age: 72
End: 2022-08-09

## 2022-08-09 ENCOUNTER — HOSPITAL ENCOUNTER (OUTPATIENT)
Facility: MEDICAL CENTER | Age: 72
End: 2022-08-09
Attending: INTERNAL MEDICINE
Payer: MEDICARE

## 2022-08-09 ENCOUNTER — APPOINTMENT (OUTPATIENT)
Dept: MEDICAL GROUP | Facility: MEDICAL CENTER | Age: 72
End: 2022-08-09
Payer: MEDICARE

## 2022-08-09 ENCOUNTER — HOSPITAL ENCOUNTER (EMERGENCY)
Facility: MEDICAL CENTER | Age: 72
End: 2022-08-09
Attending: EMERGENCY MEDICINE
Payer: MEDICARE

## 2022-08-09 VITALS
RESPIRATION RATE: 18 BRPM | DIASTOLIC BLOOD PRESSURE: 93 MMHG | HEART RATE: 72 BPM | WEIGHT: 166.01 LBS | SYSTOLIC BLOOD PRESSURE: 143 MMHG | BODY MASS INDEX: 20.64 KG/M2 | OXYGEN SATURATION: 96 % | TEMPERATURE: 98 F | HEIGHT: 75 IN

## 2022-08-09 VITALS
OXYGEN SATURATION: 98 % | HEIGHT: 75 IN | TEMPERATURE: 97.5 F | DIASTOLIC BLOOD PRESSURE: 84 MMHG | HEART RATE: 85 BPM | BODY MASS INDEX: 20.72 KG/M2 | SYSTOLIC BLOOD PRESSURE: 116 MMHG | WEIGHT: 166.67 LBS

## 2022-08-09 DIAGNOSIS — R30.0 DYSURIA: ICD-10-CM

## 2022-08-09 DIAGNOSIS — Z79.4 TYPE 2 DIABETES MELLITUS WITHOUT COMPLICATION, WITH LONG-TERM CURRENT USE OF INSULIN (HCC): ICD-10-CM

## 2022-08-09 DIAGNOSIS — T83.9XXA PROBLEM WITH FOLEY CATHETER, INITIAL ENCOUNTER (HCC): ICD-10-CM

## 2022-08-09 DIAGNOSIS — E11.9 TYPE 2 DIABETES MELLITUS WITHOUT COMPLICATION, WITH LONG-TERM CURRENT USE OF INSULIN (HCC): ICD-10-CM

## 2022-08-09 DIAGNOSIS — N39.0 URINARY TRACT INFECTION ASSOCIATED WITH CATHETERIZATION OF URINARY TRACT, UNSPECIFIED INDWELLING URINARY CATHETER TYPE, SEQUELA: ICD-10-CM

## 2022-08-09 DIAGNOSIS — T83.511S URINARY TRACT INFECTION ASSOCIATED WITH CATHETERIZATION OF URINARY TRACT, UNSPECIFIED INDWELLING URINARY CATHETER TYPE, SEQUELA: ICD-10-CM

## 2022-08-09 PROBLEM — T83.511A CATHETER-ASSOCIATED URINARY TRACT INFECTION (HCC): Status: ACTIVE | Noted: 2022-08-09

## 2022-08-09 LAB
APPEARANCE UR: ABNORMAL
APPEARANCE UR: NORMAL
BACTERIA #/AREA URNS HPF: ABNORMAL /HPF
BILIRUB UR QL STRIP.AUTO: NEGATIVE
BILIRUB UR STRIP-MCNC: NEGATIVE MG/DL
COLOR UR AUTO: YELLOW
COLOR UR: YELLOW
EPI CELLS #/AREA URNS HPF: ABNORMAL /HPF
GLUCOSE UR STRIP.AUTO-MCNC: NEGATIVE MG/DL
GLUCOSE UR STRIP.AUTO-MCNC: NEGATIVE MG/DL
KETONES UR STRIP.AUTO-MCNC: NEGATIVE MG/DL
KETONES UR STRIP.AUTO-MCNC: NEGATIVE MG/DL
LEUKOCYTE ESTERASE UR QL STRIP.AUTO: ABNORMAL
LEUKOCYTE ESTERASE UR QL STRIP.AUTO: NORMAL
MICRO URNS: ABNORMAL
MUCOUS THREADS #/AREA URNS HPF: ABNORMAL /HPF
NITRITE UR QL STRIP.AUTO: POSITIVE
NITRITE UR QL STRIP.AUTO: POSITIVE
PH UR STRIP.AUTO: 6 [PH] (ref 5–8)
PH UR STRIP.AUTO: 6 [PH] (ref 5–8)
PROT UR QL STRIP: NEGATIVE MG/DL
PROT UR QL STRIP: NEGATIVE MG/DL
RBC # URNS HPF: ABNORMAL /HPF
RBC UR QL AUTO: ABNORMAL
RBC UR QL AUTO: NORMAL
SP GR UR STRIP.AUTO: 1.01
SP GR UR STRIP.AUTO: 1.01
UROBILINOGEN UR STRIP-MCNC: 0.2 MG/DL
WBC #/AREA URNS HPF: ABNORMAL /HPF

## 2022-08-09 PROCEDURE — 99214 OFFICE O/P EST MOD 30 MIN: CPT | Performed by: INTERNAL MEDICINE

## 2022-08-09 PROCEDURE — 81002 URINALYSIS NONAUTO W/O SCOPE: CPT | Performed by: INTERNAL MEDICINE

## 2022-08-09 PROCEDURE — 99284 EMERGENCY DEPT VISIT MOD MDM: CPT

## 2022-08-09 PROCEDURE — 51701 INSERT BLADDER CATHETER: CPT

## 2022-08-09 PROCEDURE — 87077 CULTURE AEROBIC IDENTIFY: CPT

## 2022-08-09 PROCEDURE — 87086 URINE CULTURE/COLONY COUNT: CPT | Mod: 91

## 2022-08-09 PROCEDURE — 303105 HCHG CATHETER EXTRA

## 2022-08-09 PROCEDURE — 87186 SC STD MICRODIL/AGAR DIL: CPT

## 2022-08-09 PROCEDURE — 81001 URINALYSIS AUTO W/SCOPE: CPT

## 2022-08-09 PROCEDURE — 87086 URINE CULTURE/COLONY COUNT: CPT

## 2022-08-09 PROCEDURE — 51702 INSERT TEMP BLADDER CATH: CPT

## 2022-08-09 RX ORDER — CIPROFLOXACIN 500 MG/1
500 TABLET, FILM COATED ORAL 2 TIMES DAILY
Qty: 14 TABLET | Refills: 0 | Status: SHIPPED | OUTPATIENT
Start: 2022-08-09 | End: 2022-08-16

## 2022-08-09 RX ORDER — INSULIN DEGLUDEC 200 U/ML
12 INJECTION, SOLUTION SUBCUTANEOUS DAILY
Qty: 9 ML | Refills: 1 | Status: SHIPPED | OUTPATIENT
Start: 2022-08-09 | End: 2022-11-07

## 2022-08-09 ASSESSMENT — FIBROSIS 4 INDEX
FIB4 SCORE: 0.93
FIB4 SCORE: 0.93

## 2022-08-09 NOTE — LETTER
8/18/2022               Francesco Birdman  [address] [place] [address]        Dear Francesco (MR#[id number])    As we have been unable to contact you by phone, this letter is sent in regards to your recent visit to the [facility] Emergency Department on 8/9/2022. During the visit, tests were performed to assist the physician in a medical diagnosis. A review of those tests requires that we notify you of the following:    Your urine culture and sensitivity was POSITIVE for a bacteria called Methicillin Sensitive Staphylococcus aureus, and further treatment may be necessary. The currently prescribed antibiotic ( may not be effective in treating your infection. IF YOU ARE NOT FEELING BETTER PLEASE CONTACT ME AS SOON AS POSSIBLE AT THE NUMBER BELOW.       Thank you for your cooperation in the matter.    Sincerely,  ED Culture Follow-Up Staff  Dionicio Pierre, Eileen    [facility], Emergency Department  [address] [place] [id number] [phone] (ED Culture Line)

## 2022-08-10 DIAGNOSIS — N39.0 URINARY TRACT INFECTION ASSOCIATED WITH CATHETERIZATION OF URINARY TRACT, UNSPECIFIED INDWELLING URINARY CATHETER TYPE, SEQUELA: ICD-10-CM

## 2022-08-10 DIAGNOSIS — T83.511S URINARY TRACT INFECTION ASSOCIATED WITH CATHETERIZATION OF URINARY TRACT, UNSPECIFIED INDWELLING URINARY CATHETER TYPE, SEQUELA: ICD-10-CM

## 2022-08-10 NOTE — ED NOTES
Introduced self and RN role. Oriented pt. Room and how to get help. Pt verbalizes understanding. Warm blanket given. Pt reports he was seen by PCP and was told to come to the ER for go management. Pt reports he does have an appointment with urology in 3 weeks. Education given on how to care for Go, pt has been using alcohol wipes to clean the head of his penis. Advised to use soap and water and not alcohol. Pt verbalizes understanding. Pt verbalizes understanding. Pt states the PCP ordered him ABX for infection, pt states no s/s of UTI, denies back pain.States they took the sample from the urinary bag. States he feels ok. States he has other medical issues, but is doing ok. Will provide supplies until the patient can see urology. Pt is verbalizes understanding. Will continue to monitor. Pt placed in a gown and is ready for the provider

## 2022-08-10 NOTE — ASSESSMENT & PLAN NOTE
This is an ongoing problem, patient has been experiencing intermittent subjective fever at home, right flank pain.  POCT UA in clinic had large leukocyte esterase.  Patient had a urinary catheter present for over 4 weeks now.  He does not have appointment with urology until the 23rd to have it replaced and also he is going out of town for 2 weeks tomorrow.   -Advised patient to have his urinary catheter replaced in the emergency room prior to leaving on history.   - Prescribed course of ciprofloxacin, send out urine culture.   -Emphasized multiple times the importance of replacing the urinary catheter, as patient now has catheter associated urinary tract infection, which potentially may cause bloodstream infection and sepsis.  Patient verbalizes understanding and agrees.

## 2022-08-10 NOTE — ED TRIAGE NOTES
"71 yr old male to triage  Chief Complaint   Patient presents with   • Urinary Catheter Problem     Patient here for replacement of his urinary catheter placed approximately 3-4 weeks ago.  No fever. Bilateral flank pain.  Patient was sent for go removal.      /86   Pulse 85   Temp 36.3 °C (97.3 °F) (Temporal)   Resp 16   Ht 1.905 m (6' 3\")   Wt 75.3 kg (166 lb 0.1 oz)   SpO2 97%   BMI 20.75 kg/m²     "

## 2022-08-10 NOTE — PATIENT INSTRUCTIONS
Please present to ER   Your Urinary catheter needs to be replaced, due to catheter associated UTI.   If this is not done, you may develop severe infection and bloodstream infection, which can be potentially life-threatening.

## 2022-08-10 NOTE — ED PROVIDER NOTES
ED Provider Note    ER PROVIDER NOTE      CHIEF COMPLAINT  Chief Complaint   Patient presents with   • Urinary Catheter Problem     Patient here for replacement of his urinary catheter placed approximately 3-4 weeks ago.  No fever. Bilateral flank pain.  Patient was sent for go removal.        HPI  Francesco Schulte is a 71 y.o. male who presents to the emergency department complaining of need for his urinary in the emergency department over 3 weeks ago for urinary obstruction.  He saw his primary care today who did a urinalysis that showed signs of infection and sent him here to have it changed.  She gave him a prescription for antibiotic as well.  He reports no fever although he states some nights he does get sweaty, no abdominal pain, no vomiting.  He does have some flank pain although does have shingles as well.    REVIEW OF SYSTEMS  Pertinent positives include need to change urinary catheter. Pertinent negatives include no vomiting or abdominal pain. See HPI for details. All other systems reviewed and are negative.    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), Agent orange exposure, Anesthesia, Cancer ([facility]), Diabetes ([facility]), Family history of punctured lung (2002), Heart attack ([facility]) (03/2017), Hemorrhagic disorder ([facility]), High cholesterol, Ischemic cardiomyopathy, Kidney stones, Lupus ([facility]) (11/15/2019), Orthostatic hypotension (3/29/2018), Pain, Polycythemia vera(238.4), Stroke ([facility]) (2016), Urinary bladder disorder, and Vertigo.    SURGICAL HISTORY   has a past surgical history that includes other cardiac surgery; cath placement; laminotomy; appendectomy; carotid endarterectomy (Right, 3/21/2018); temporal artery biopsy (Right, 6/26/2018); incis/drain scrotum/testis,epididym (Right, 7/25/2020); explore scrotum (Right, 7/29/2020); explore scrotum (Right, 7/31/2020); skin abscess incision and drainage (Right, 8/3/2020); split thickness skin graft (N/A,  "8/23/2020); aicd implant (07/29/2021); and jacqueline (N/A, 10/30/2021).    FAMILY HISTORY  Family History   Problem Relation Age of Onset   • Ovarian Cancer Neg Hx    • Diabetes Neg Hx        SOCIAL HISTORY  Social History     Socioeconomic History   • Marital status:    Tobacco Use   • Smoking status: Never Smoker   • Smokeless tobacco: Never Used   Vaping Use   • Vaping Use: Never used   Substance and Sexual Activity   • Alcohol use: No   • Drug use: No      Social History     Substance and Sexual Activity   Drug Use No       CURRENT MEDICATIONS  Home Medications     Reviewed by Marquita Aparicio R.N. (Registered Nurse) on 08/09/22 at 1919  Med List Status: Partial   Medication Last Dose Status   acyclovir (ZOVIRAX) 800 MG Tab  Active   apixaban (ELIQUIS) 5mg Tab  Active   ciprofloxacin (CIPRO) 500 MG Tab  Active   dapagliflozin propanediol (FARXIGA) 10 MG Tab  Active   dicyclomine (BENTYL) 10 MG Cap  Active   finasteride (PROSCAR) 5 MG Tab  Active   gabapentin (NEURONTIN) 100 MG Cap  Active   glucose blood strip  Active   glucose blood strip  Active   hydroxyurea (HYDREA) 500 MG Cap  Active   Insulin Degludec (TRESIBA FLEXTOUCH) 200 UNIT/ML Solution Pen-injector  Active   naproxen (NAPROSYN) 250 MG Tab  Active   nitroglycerin (NITROSTAT) 0.4 MG SL Tab  Active   tamsulosin (FLOMAX) 0.4 MG capsule  Active                ALLERGIES  Allergies   Allergen Reactions   • Doxycycline      Swelling lips and difficulties swallowing   • Atorvastatin      Pt and pts wife are not sure what happens    • Beta Adrenergic Blockers Unspecified     dizziness   • Eplerenone Unspecified     Intolerance, dehydration, altered mental status   • Metformin Unspecified and Palpitations     Cardiac effects  \"Similar to a heart attack, I was hospitalized\"   • Simvastatin      Other reaction(s): Other (Specify with Comments)  Myalgias  Myalgias   • Spironolactone Palpitations   • Statins [Hmg-Coa-R Inhibitors]      Muscle ache, joint pain " "      PHYSICAL EXAM  VITAL SIGNS: /86   Pulse 85   Temp 36.3 °C (97.3 °F) (Temporal)   Resp 16   Ht 1.905 m (6' 3\")   Wt 75.3 kg (166 lb 0.1 oz)   SpO2 97%   BMI 20.75 kg/m²   Pulse ox interpretation: I interpret this pulse ox as normal.    Constitutional: Alert in no apparent distress.  HENT: No signs of trauma, Bilateral external ears normal, Nose normal.   Eyes: Pupils are equal and reactive, Conjunctiva normal, Non-icteric.   Neck: Normal range of motion, No tenderness, Supple, No stridor.   Lymphatic: No lymphadenopathy noted.   Cardiovascular: Regular rate and rhythm, no murmurs.   Thorax & Lungs: Normal breath sounds, No respiratory distress, No wheezing, No chest tenderness.   Abdomen: Bowel sounds normal, Soft, No tenderness, No masses, No pulsatile masses. No peritoneal signs.  Skin: Warm, Dry, No erythema, No rash.   Back: No bony tenderness, No CVA tenderness.  Herpetic rash on the left flank  Extremities: Intact distal pulses, No edema, No tenderness, No cyanosis, Negative Olga Lidia's sign.  Musculoskeletal: Good range of motion in all major joints. No tenderness to palpation or major deformities noted.   Neurologic: Alert , Normal motor function, Normal sensory function, No focal deficits noted.   Psychiatric: Affect normal, Judgment normal, Mood normal.     DIAGNOSTIC STUDIES / PROCEDURES      RADIOLOGY  No orders to display     The radiologist's interpretation of all radiological studies have been reviewed and images independently viewed by me.    COURSE & MEDICAL DECISION MAKING  Nursing notes, VS, PMSFHx reviewed in chart.    8:14 PM Patient seen and examined at bedside.  Will check urinalysis, change catheter    9:37 PM  Patient is reevaluated, has had his catheter changed which he tolerated well.  We will plan for discharge      Decision Making:  This is a 71 y.o. male presenting with need to have his Andres catheter changed.  Patient has an indwelling catheter due to urinary obstruction " and is primary care today who diagnosed with urinary tract infection and sent him here to have his catheter changed.  He is overall quite well-appearing, he is afebrile with appropriate vital signs and does not appear toxic or septic.  I have sent his urine for culture from a new catheter.  He is already being treated with antibiotics from his primary care       The patient will return for new or worsening symptoms and is stable at the time of discharge.    The patient is referred to a primary physician for blood pressure management, diabetic screening, and for all other preventative health concerns.      DISPOSITION:  Patient will be discharged home in stable condition.    FOLLOW UP:  Tala Dunne M.D.  [address]  [address]  [facility] [id number] [phone]    In 1 week        OUTPATIENT MEDICATIONS:  New Prescriptions    No medications on file         FINAL IMPRESSION  1. Problem with Andres catheter, initial encounter ([facility])       The note accurately reflects work and decisions made by me.  Benny Ramirez M.D.  8/9/2022  9:38 PM

## 2022-08-10 NOTE — ASSESSMENT & PLAN NOTE
Continue Tresiba 12 units daily, will 3 to 4 units of NovoLog with meals.  New refill of Tresiba was sent to patient's pharmacy.  He still has supply of NovoLog at home.  He will continue recording his fasting and postprandial blood sugar.  Advised to make appointment with Dr. Mckeon to follow-up on diabetes.

## 2022-08-10 NOTE — PROGRESS NOTES
Subjective:     Chief Complaint   Patient presents with   • Follow-Up     Antibiotics   Infection   Sweating   Shingles   [place]     Urology on 23rd     Diagnoses of Dysuria, Urinary tract infection associated with catheterization of urinary tract, unspecified indwelling urinary catheter type, sequela, and Type 2 diabetes mellitus without complication, with long-term current use of insulin ([facility]) were pertinent to this visit.    HPI: Francesco is a pleasant 71 y.o. male who presents today for follow up    Problem   Catheter-Associated Urinary Tract Infection (Hcc)    This is a new problem, patient has had urinary catheter inserted in emergency room 4 weeks ago due to urinary retention.  He has been experiencing subjective fever, right flank pain.  He tells me he has experienced similar symptoms are when he was diagnosed with urinary tract infection last time.         Type 2 Diabetes Mellitus Without Complication, With Long-Term Current Use of Insulin (Hcc)    This is a chronic condition uncontrolled, managed by endocrinology Dr. Mckeon, patient has not seen him for over a year now. His current regimen includes Tresiba 12 Units daily, also Humalog sliding scale with meals.  and Farxiga but was on hold due to urinary symptoms.    Lab Results   Component Value Date/Time    HBA1C 8.4 (H) 07/06/2022 10:25 AM      Fasting sugars: 130s, 215-220 postprandial  Last diabetic foot exam: 07/26/2022  Last retinal eye exam: due, referred  ACEi/ARB: contraindicated due to orthostatic hypotension, on Farxyga   Statin: intolerant   Aspirin: no, on Eliquis  Concomitant HTN: no             Past Medical History:   Diagnosis Date   • 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   • Agent orange exposure    • Anesthesia     resistant to pain meds   • Cancer (HCC)     polycythemia vera   • Diabetes (HCC)     insulin and oral meds   • Family history of punctured lung 2002    left lung   • Heart attack (HCC) 03/2017   • Hemorrhagic disorder  ([facility])     on blood thinners   • High cholesterol    • Ischemic cardiomyopathy    • Kidney stones     hx of kidney stones   • Lupus ([facility]) 11/15/2019   • Orthostatic hypotension 3/29/2018   • Pain     headache pain   • Polycythemia vera(238.4)    • Stroke ([facility]) 2016    r/t afib; eye issues resolved afterward   • Urinary bladder disorder     enlarged prostate, weak stream, difficulty urinating   • Vertigo      Current Outpatient Medications Ordered in Epic   Medication Sig Dispense Refill   • ciprofloxacin (CIPRO) 500 MG Tab Take 1 Tablet by mouth 2 times a day for 7 days. 14 Tablet 0   • Insulin Degludec (TRESIBA FLEXTOUCH) 200 UNIT/ML Solution Pen-injector Inject 12 Units under the skin every day for 90 days. 9 mL 1   • apixaban (ELIQUIS) 5mg Tab Eliquis 5 mg tablet   TAKE 1 TABLET BY MOUTH TWICE A DAY     • dicyclomine (BENTYL) 10 MG Cap dicyclomine 10 mg capsule   TAKE 1 TABLET BY MOUTH EVERY 6 HOURS AS NEEDED FOR ABDOMINAL CRAMPS AND/OR DISCOMFORT     • naproxen (NAPROSYN) 250 MG Tab naproxen 250 mg tablet   750 mg by oral route.     • glucose blood strip OneTouch Verio test strips   1 STRIP BY OTHER ROUTE AS NEEDED.     • acyclovir (ZOVIRAX) 800 MG Tab Take 1 Tablet by mouth every day. 7 Tablet 0   • gabapentin (NEURONTIN) 100 MG Cap Take 1 Capsule by mouth 3 times a day for 14 days. 42 Capsule 0   • dapagliflozin propanediol (FARXIGA) 10 MG Tab Take 5 mg by mouth every day.     • tamsulosin (FLOMAX) 0.4 MG capsule Take 1 Capsule by mouth every day. 90 Capsule 1   • glucose blood strip 1 Strip by Other route as needed. 100 Strip 1   • finasteride (PROSCAR) 5 MG Tab Take 1 Tablet by mouth every day. 90 Tablet 1   • nitroglycerin (NITROSTAT) 0.4 MG SL Tab Place 1 Tablet under the tongue as needed for Chest Pain (or hypertension >180/110). 25 Tablet 3   • hydroxyurea (HYDREA) 500 MG Cap Take 500 mg by mouth 2 Times a Day.       No current [facility]-ordered facility-administered medications on file.  "      ROS        Objective:     Exam:  /84 (BP Location: Right arm, Patient Position: Sitting, BP Cuff Size: Adult)   Pulse 85   Temp 36.4 °C (97.5 °F) (Temporal)   Ht 1.905 m (6' 3\")   Wt 75.6 kg (166 lb 10.7 oz)   SpO2 98%   BMI 20.83 kg/m²  Body mass index is 20.83 kg/m².    Physical Exam  Vitals reviewed.   Constitutional:       General: He is not in acute distress.     Appearance: Normal appearance. He is normal weight. He is not ill-appearing or toxic-appearing.   HENT:      Head: Normocephalic and atraumatic.      Nose: Nose normal. No congestion or rhinorrhea.   Cardiovascular:      Rate and Rhythm: Normal rate and regular rhythm.      Pulses: Normal pulses.      Heart sounds: Normal heart sounds.      Comments: Distant heart sounds  Pulmonary:      Effort: Pulmonary effort is normal. No respiratory distress.      Breath sounds: Normal breath sounds.   Abdominal:      General: Abdomen is flat. There is no distension.      Tenderness: There is abdominal tenderness (slight suprapubic). There is no right CVA tenderness, left CVA tenderness, guarding or rebound.   Skin:            Comments: Herpetic rash following L2 dermatome, in healing stages   Neurological:      Mental Status: He is alert and oriented to person, place, and time.   Psychiatric:         Attention and Perception: He is attentive.         Mood and Affect: Mood and affect normal.       Labs: POCT UA    Assessment & Plan:   Francesco  is a pleasant 71 y.o. male with the following -     Problem List Items Addressed This Visit     Type 2 diabetes mellitus without complication, with long-term current use of insulin ([facility]) (Chronic)     Continue Tresiba 12 units daily, will 3 to 4 units of NovoLog with meals.  New refill of Tresiba was sent to patient's pharmacy.  He still has supply of NovoLog at home.  He will continue recording his fasting and postprandial blood sugar.  Advised to make appointment with Dr. Mckeon to follow-up on " diabetes.           Relevant Medications    Insulin Degludec (TRESIBA FLEXTOUCH) 200 UNIT/ML Solution Pen-injector    Catheter-associated urinary tract infection (HCC)     This is an ongoing problem, patient has been experiencing intermittent subjective fever at home, right flank pain.  POCT UA in clinic had large leukocyte esterase.  Patient had a urinary catheter present for over 4 weeks now.  He does not have appointment with urology until the 23rd to have it replaced and also he is going out of town for 2 weeks tomorrow.   -Advised patient to have his urinary catheter replaced in the emergency room prior to leaving on history.   - Prescribed course of ciprofloxacin, send out urine culture.   -Emphasized multiple times the importance of replacing the urinary catheter, as patient now has catheter associated urinary tract infection, which potentially may cause bloodstream infection and sepsis.  Patient verbalizes understanding and agrees.           Relevant Medications    ciprofloxacin (CIPRO) 500 MG Tab    Other Relevant Orders    URINE CULTURE(NEW)      Other Visit Diagnoses     Dysuria        Relevant Orders    POCT Urinalysis (Completed)        Return in about 4 weeks (around 9/6/2022), or if symptoms worsen or fail to improve.    Please note that this dictation was created using voice recognition software. I have made every reasonable attempt to correct obvious errors, but I expect that there are errors of grammar and possibly content that I did not discover before finalizing the note.

## 2022-08-11 LAB
AMBIGUOUS DTTM AMBI4: NORMAL
SIGNIFICANT IND 70042: NORMAL
SITE SITE: NORMAL
SOURCE SOURCE: NORMAL

## 2022-08-18 ENCOUNTER — APPOINTMENT (OUTPATIENT)
Dept: RADIOLOGY | Facility: MEDICAL CENTER | Age: 72
DRG: 699 | End: 2022-08-18
Attending: EMERGENCY MEDICINE
Payer: MEDICARE

## 2022-08-18 ENCOUNTER — HOSPITAL ENCOUNTER (INPATIENT)
Facility: MEDICAL CENTER | Age: 72
LOS: 5 days | DRG: 699 | End: 2022-08-24
Attending: EMERGENCY MEDICINE | Admitting: INTERNAL MEDICINE
Payer: MEDICARE

## 2022-08-18 ENCOUNTER — TELEPHONE (OUTPATIENT)
Dept: MEDICAL GROUP | Facility: MEDICAL CENTER | Age: 72
End: 2022-08-18
Payer: MEDICARE

## 2022-08-18 DIAGNOSIS — B02.29 POST HERPETIC NEURALGIA: ICD-10-CM

## 2022-08-18 DIAGNOSIS — N39.0 URINARY TRACT INFECTION ASSOCIATED WITH INDWELLING URETHRAL CATHETER, SUBSEQUENT ENCOUNTER: ICD-10-CM

## 2022-08-18 DIAGNOSIS — T83.511A URINARY TRACT INFECTION ASSOCIATED WITH INDWELLING URETHRAL CATHETER, INITIAL ENCOUNTER (HCC): ICD-10-CM

## 2022-08-18 DIAGNOSIS — T83.511D URINARY TRACT INFECTION ASSOCIATED WITH INDWELLING URETHRAL CATHETER, SUBSEQUENT ENCOUNTER: ICD-10-CM

## 2022-08-18 DIAGNOSIS — N39.0 URINARY TRACT INFECTION ASSOCIATED WITH INDWELLING URETHRAL CATHETER, SEQUELA: ICD-10-CM

## 2022-08-18 DIAGNOSIS — R10.84 GENERALIZED ABDOMINAL PAIN: ICD-10-CM

## 2022-08-18 DIAGNOSIS — N39.0 URINARY TRACT INFECTION ASSOCIATED WITH INDWELLING URETHRAL CATHETER, INITIAL ENCOUNTER (HCC): ICD-10-CM

## 2022-08-18 DIAGNOSIS — B02.29 POSTHERPETIC NEURALGIA: ICD-10-CM

## 2022-08-18 DIAGNOSIS — B02.9 HERPES ZOSTER WITHOUT COMPLICATION: ICD-10-CM

## 2022-08-18 DIAGNOSIS — T83.511S URINARY TRACT INFECTION ASSOCIATED WITH INDWELLING URETHRAL CATHETER, SEQUELA: ICD-10-CM

## 2022-08-18 LAB
ALBUMIN SERPL BCP-MCNC: 3.5 G/DL (ref 3.2–4.9)
ALBUMIN/GLOB SERPL: 0.8 G/DL
ALP SERPL-CCNC: 67 U/L (ref 30–99)
ALT SERPL-CCNC: 21 U/L (ref 2–50)
ANION GAP SERPL CALC-SCNC: 12 MMOL/L (ref 7–16)
ANISOCYTOSIS BLD QL SMEAR: ABNORMAL
APPEARANCE UR: ABNORMAL
AST SERPL-CCNC: 20 U/L (ref 12–45)
BACTERIA #/AREA URNS HPF: ABNORMAL /HPF
BASOPHILS # BLD AUTO: 1.3 % (ref 0–1.8)
BASOPHILS # BLD: 0.06 K/UL (ref 0–0.12)
BILIRUB SERPL-MCNC: 0.6 MG/DL (ref 0.1–1.5)
BILIRUB UR QL STRIP.AUTO: NEGATIVE
BUN SERPL-MCNC: 16 MG/DL (ref 8–22)
CALCIUM SERPL-MCNC: 9.2 MG/DL (ref 8.4–10.2)
CHLORIDE SERPL-SCNC: 106 MMOL/L (ref 96–112)
CO2 SERPL-SCNC: 21 MMOL/L (ref 20–33)
COLOR UR: YELLOW
COMMENT 1642: NORMAL
CREAT SERPL-MCNC: 0.84 MG/DL (ref 0.5–1.4)
EOSINOPHIL # BLD AUTO: 0.1 K/UL (ref 0–0.51)
EOSINOPHIL NFR BLD: 2.2 % (ref 0–6.9)
ERYTHROCYTE [DISTWIDTH] IN BLOOD BY AUTOMATED COUNT: 70 FL (ref 35.9–50)
GFR SERPLBLD CREATININE-BSD FMLA CKD-EPI: 94 ML/MIN/1.73 M 2
GLOBULIN SER CALC-MCNC: 4.3 G/DL (ref 1.9–3.5)
GLUCOSE SERPL-MCNC: 204 MG/DL (ref 65–99)
GLUCOSE UR STRIP.AUTO-MCNC: NEGATIVE MG/DL
HCT VFR BLD AUTO: 45.6 % (ref 42–52)
HGB BLD-MCNC: 15.5 G/DL (ref 14–18)
IMM GRANULOCYTES # BLD AUTO: 0.01 K/UL (ref 0–0.11)
IMM GRANULOCYTES NFR BLD AUTO: 0.2 % (ref 0–0.9)
KETONES UR STRIP.AUTO-MCNC: NEGATIVE MG/DL
LACTATE BLD-SCNC: 2.6 MMOL/L (ref 0.5–2)
LEUKOCYTE ESTERASE UR QL STRIP.AUTO: ABNORMAL
LYMPHOCYTES # BLD AUTO: 0.59 K/UL (ref 1–4.8)
LYMPHOCYTES NFR BLD: 13 % (ref 22–41)
MACROCYTES BLD QL SMEAR: ABNORMAL
MCH RBC QN AUTO: 38.8 PG (ref 27–33)
MCHC RBC AUTO-ENTMCNC: 34 G/DL (ref 33.7–35.3)
MCV RBC AUTO: 114 FL (ref 81.4–97.8)
MICRO URNS: ABNORMAL
MONOCYTES # BLD AUTO: 0.37 K/UL (ref 0–0.85)
MONOCYTES NFR BLD AUTO: 8.1 % (ref 0–13.4)
MUCOUS THREADS #/AREA URNS HPF: ABNORMAL /HPF
NEUTROPHILS # BLD AUTO: 3.42 K/UL (ref 1.82–7.42)
NEUTROPHILS NFR BLD: 75.2 % (ref 44–72)
NITRITE UR QL STRIP.AUTO: POSITIVE
NRBC # BLD AUTO: 0 K/UL
NRBC BLD-RTO: 0 /100 WBC
PH UR STRIP.AUTO: 6 [PH] (ref 5–8)
PLATELET # BLD AUTO: 173 K/UL (ref 164–446)
PLATELET BLD QL SMEAR: NORMAL
PMV BLD AUTO: 10.5 FL (ref 9–12.9)
POTASSIUM SERPL-SCNC: 4 MMOL/L (ref 3.6–5.5)
PROT SERPL-MCNC: 7.8 G/DL (ref 6–8.2)
PROT UR QL STRIP: 30 MG/DL
RBC # BLD AUTO: 4 M/UL (ref 4.7–6.1)
RBC # URNS HPF: ABNORMAL /HPF
RBC BLD AUTO: PRESENT
RBC UR QL AUTO: ABNORMAL
SODIUM SERPL-SCNC: 139 MMOL/L (ref 135–145)
SP GR UR STRIP.AUTO: 1.02
TROPONIN T SERPL-MCNC: 15 NG/L (ref 6–19)
WBC # BLD AUTO: 4.6 K/UL (ref 4.8–10.8)
WBC #/AREA URNS HPF: ABNORMAL /HPF
YEAST BUDDING URNS QL: PRESENT /HPF

## 2022-08-18 PROCEDURE — 71045 X-RAY EXAM CHEST 1 VIEW: CPT

## 2022-08-18 PROCEDURE — 74177 CT ABD & PELVIS W/CONTRAST: CPT | Mod: ME

## 2022-08-18 PROCEDURE — 87186 SC STD MICRODIL/AGAR DIL: CPT

## 2022-08-18 PROCEDURE — 87040 BLOOD CULTURE FOR BACTERIA: CPT | Mod: 91

## 2022-08-18 PROCEDURE — 96375 TX/PRO/DX INJ NEW DRUG ADDON: CPT

## 2022-08-18 PROCEDURE — 87086 URINE CULTURE/COLONY COUNT: CPT

## 2022-08-18 PROCEDURE — 80053 COMPREHEN METABOLIC PANEL: CPT

## 2022-08-18 PROCEDURE — 81001 URINALYSIS AUTO W/SCOPE: CPT

## 2022-08-18 PROCEDURE — 85025 COMPLETE CBC W/AUTO DIFF WBC: CPT

## 2022-08-18 PROCEDURE — 84484 ASSAY OF TROPONIN QUANT: CPT

## 2022-08-18 PROCEDURE — 700105 HCHG RX REV CODE 258: Performed by: EMERGENCY MEDICINE

## 2022-08-18 PROCEDURE — 36415 COLL VENOUS BLD VENIPUNCTURE: CPT

## 2022-08-18 PROCEDURE — 93005 ELECTROCARDIOGRAM TRACING: CPT | Performed by: EMERGENCY MEDICINE

## 2022-08-18 PROCEDURE — 87077 CULTURE AEROBIC IDENTIFY: CPT

## 2022-08-18 PROCEDURE — 99285 EMERGENCY DEPT VISIT HI MDM: CPT

## 2022-08-18 PROCEDURE — 700117 HCHG RX CONTRAST REV CODE 255: Performed by: EMERGENCY MEDICINE

## 2022-08-18 PROCEDURE — 83605 ASSAY OF LACTIC ACID: CPT

## 2022-08-18 PROCEDURE — 96365 THER/PROPH/DIAG IV INF INIT: CPT

## 2022-08-18 PROCEDURE — 700111 HCHG RX REV CODE 636 W/ 250 OVERRIDE (IP): Performed by: EMERGENCY MEDICINE

## 2022-08-18 PROCEDURE — 93005 ELECTROCARDIOGRAM TRACING: CPT

## 2022-08-18 RX ORDER — FLUCONAZOLE 200 MG/1
200 TABLET ORAL ONCE
Status: COMPLETED | OUTPATIENT
Start: 2022-08-19 | End: 2022-08-19

## 2022-08-18 RX ORDER — MORPHINE SULFATE 4 MG/ML
4 INJECTION INTRAVENOUS ONCE
Status: COMPLETED | OUTPATIENT
Start: 2022-08-18 | End: 2022-08-18

## 2022-08-18 RX ORDER — SODIUM CHLORIDE, SODIUM LACTATE, POTASSIUM CHLORIDE, CALCIUM CHLORIDE 600; 310; 30; 20 MG/100ML; MG/100ML; MG/100ML; MG/100ML
1000 INJECTION, SOLUTION INTRAVENOUS ONCE
Status: COMPLETED | OUTPATIENT
Start: 2022-08-18 | End: 2022-08-19

## 2022-08-18 RX ORDER — ONDANSETRON 2 MG/ML
4 INJECTION INTRAMUSCULAR; INTRAVENOUS ONCE
Status: COMPLETED | OUTPATIENT
Start: 2022-08-18 | End: 2022-08-18

## 2022-08-18 RX ADMIN — PIPERACILLIN AND TAZOBACTAM 3.38 G: 3; .375 INJECTION, POWDER, LYOPHILIZED, FOR SOLUTION INTRAVENOUS; PARENTERAL at 22:02

## 2022-08-18 RX ADMIN — SODIUM CHLORIDE, POTASSIUM CHLORIDE, SODIUM LACTATE AND CALCIUM CHLORIDE 1000 ML: 600; 310; 30; 20 INJECTION, SOLUTION INTRAVENOUS at 23:00

## 2022-08-18 RX ADMIN — MORPHINE SULFATE 4 MG: 4 INJECTION INTRAVENOUS at 22:12

## 2022-08-18 RX ADMIN — ONDANSETRON 4 MG: 2 INJECTION INTRAMUSCULAR; INTRAVENOUS at 22:12

## 2022-08-18 RX ADMIN — IOHEXOL 100 ML: 350 INJECTION, SOLUTION INTRAVENOUS at 23:59

## 2022-08-18 ASSESSMENT — FIBROSIS 4 INDEX: FIB4 SCORE: 0.94

## 2022-08-18 NOTE — ED NOTES
"ED Positive Culture Follow-up/Notification Note:   Date: 8/18/22    Patient seen in the ED on 8/9/2022 for referral from PCP for urinary catheter exchange and complaints of dysuria and flank pain. Patient currently has shingles. 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 At presentation patient was afebrile, no abdominal pain, no vomiting, no CVA tenderness, herpetic rash on left flank. Catheter was exchanged, urine culture was taken, and patient was discharged with instructions to finish 5 days of ciprofloxacin prescribed by his PCP that day.  1. Problem with Andres catheter, initial encounter (HCC)      Discharge Medication List as of 8/9/2022  9:53 PM        Allergies: Doxycycline, Atorvastatin, Beta adrenergic blockers, Eplerenone, Metformin, Simvastatin, Spironolactone, and Statins [hmg-coa-r inhibitors]    Vitals:    08/09/22 1915 08/09/22 1917 08/09/22 2153   BP:  123/86 (!) 143/93   Pulse:  85 72   Resp:  16 18   Temp:  36.3 °C (97.3 °F) 36.7 °C (98 °F)   TempSrc:  Temporal Temporal   SpO2:  97% 96%   Weight: 75.3 kg (166 lb 0.1 oz)     Height: 1.905 m (6' 3\")         Final cultures:   Results       Procedure Component Value Units Date/Time    URINE CULTURE(NEW) [[id number]]  (Abnormal)  (Susceptibility) Collected: 08/09/22 2115    Order Status: Completed Specimen: Urine Updated: 08/12/22 0924     Significant Indicator POS     Source UR     Site -     Culture Result -      Staphylococcus aureus  >100,000 cfu/mL      Narrative:      Indication for culture:->Unexplained new onset of Flank pain  and/or Costovertebral angle tenderness    Susceptibility       Staphylococcus aureus (1)       Antibiotic Interpretation Microscan   Method Status      Ampicillin  [*]  Resistant >8 mcg/mL JAVI Final     Amoxicillin/CA  [*]  Sensitive <=4/2 mcg/mL JAVI Final     Ceftriaxone  [*]  Sensitive <=4 mcg/mL JAVI Final     Cephalothin "  [*]  Sensitive <=8 mcg/mL JAVI Final     Cefoxitin Screen  [*]  Negative <=4 mcg/mL JAVI Final     Cefazolin Sensitive <=8 mcg/mL JAVI Final     Ciprofloxacin  [*]  Resistant >2 mcg/mL JAVI Final     Cefepime Sensitive <=4 mcg/mL JAVI Final     Ceftaroline Sensitive <=0.5 mcg/mL JAVI Final     Daptomycin Sensitive <=0.5 mcg/mL JAVI Final     Nitrofurantoin Sensitive <=32 mcg/mL JAVI Final     Ampicillin/sulbactam  [*]  Sensitive <=8/4 mcg/mL JAVI Final     Gentamicin  [*]  Resistant >8 mcg/mL JAVI Final     Vancomycin  [*]  Sensitive 1 mcg/mL JAVI Final     Imipenem  [*]  Sensitive <=4 mcg/mL JAVI Final     Levofloxacin  [*]  Resistant >4 mcg/mL JAVI Final     Linezolid  [*]  Sensitive <=1 mcg/mL JAVI Final     Oxacillin  [*]  Sensitive <=0.25 mcg/mL JAVI Final     Pip/Tazobactam Sensitive <=8 mcg/mL JAVI Final     Rifampin  [*]  Sensitive <=1 mcg/mL JAVI Final     Synercid  [*]  Sensitive <=0.5 mcg/mL JAVI Final     Trimeth/Sulfa Sensitive <=0.5/9.5 mcg/mL JAVI Final     Tetracycline Resistant >8 mcg/mL JAVI Final     Tigecycline  [*]  Sensitive <=0.25 mcg/mL JAVI Final              [*]  Suppressed Antibiotic                           Plan:   Patient's urine culture grew MSSA. No blood cultures were taken to rule out bacteremia and subsequent hematogenous seeding of MSSA. Given patient's history of MSSA infective endocarditis, I called the patient and recommended he return to the ED and have blood cultures drawn, be started on cefazolin, and ID be consulted. The patient agreed and stated he is coming into the ED this evening.    Dionicio Pierre, PharmD   ADDENDUM: Patient called back this evening explaining that while he recognizes the danger of Staph aureus an his history with it, his family is out camping in [place] [facility] and his PCP had reached out earlier and told him his ciprofloxacin script should treat the Staph aureus. He asked me specifically and more than once to reach out to Dr. Dunne and explain the situation to her and  have her reach out to him before he was willing to return to the ED. I called her office and left a voice mail with Dr. Dunne's MA explaining the situation and asking for a call back for any questions.  ADDENDUM 2: Dr. Dunne returned my call and we discussed the patient, as above. She agreed that it would be much safer for the patient to return to the ED and have blood cultures drawn, receive cefazolin, and have ID consulted. She reaching out to the patient to explain this recommendation.

## 2022-08-19 PROBLEM — E11.65 TYPE 2 DIABETES MELLITUS WITH HYPERGLYCEMIA (HCC): Status: ACTIVE | Noted: 2017-03-13

## 2022-08-19 PROBLEM — K56.7 ILEUS (HCC): Status: ACTIVE | Noted: 2022-08-19

## 2022-08-19 LAB
ANION GAP SERPL CALC-SCNC: 10 MMOL/L (ref 7–16)
BUN SERPL-MCNC: 15 MG/DL (ref 8–22)
CALCIUM SERPL-MCNC: 8.9 MG/DL (ref 8.4–10.2)
CHLORIDE SERPL-SCNC: 106 MMOL/L (ref 96–112)
CK SERPL-CCNC: 25 U/L (ref 0–154)
CO2 SERPL-SCNC: 23 MMOL/L (ref 20–33)
CREAT SERPL-MCNC: 0.85 MG/DL (ref 0.5–1.4)
EKG IMPRESSION: NORMAL
ERYTHROCYTE [DISTWIDTH] IN BLOOD BY AUTOMATED COUNT: 70.7 FL (ref 35.9–50)
GFR SERPLBLD CREATININE-BSD FMLA CKD-EPI: 93 ML/MIN/1.73 M 2
GLUCOSE BLD STRIP.AUTO-MCNC: 129 MG/DL (ref 65–99)
GLUCOSE BLD STRIP.AUTO-MCNC: 129 MG/DL (ref 65–99)
GLUCOSE BLD STRIP.AUTO-MCNC: 98 MG/DL (ref 65–99)
GLUCOSE SERPL-MCNC: 154 MG/DL (ref 65–99)
HCT VFR BLD AUTO: 39.5 % (ref 42–52)
HGB BLD-MCNC: 13.2 G/DL (ref 14–18)
LACTATE BLD-SCNC: 1.8 MMOL/L (ref 0.5–2)
LACTATE BLD-SCNC: 3.9 MMOL/L (ref 0.5–2)
MCH RBC QN AUTO: 38.7 PG (ref 27–33)
MCHC RBC AUTO-ENTMCNC: 33.4 G/DL (ref 33.7–35.3)
MCV RBC AUTO: 115.8 FL (ref 81.4–97.8)
PLATELET # BLD AUTO: 145 K/UL (ref 164–446)
PMV BLD AUTO: 10.7 FL (ref 9–12.9)
POTASSIUM SERPL-SCNC: 4 MMOL/L (ref 3.6–5.5)
RBC # BLD AUTO: 3.41 M/UL (ref 4.7–6.1)
SODIUM SERPL-SCNC: 139 MMOL/L (ref 135–145)
WBC # BLD AUTO: 3.5 K/UL (ref 4.8–10.8)

## 2022-08-19 PROCEDURE — 700102 HCHG RX REV CODE 250 W/ 637 OVERRIDE(OP): Performed by: INTERNAL MEDICINE

## 2022-08-19 PROCEDURE — 700102 HCHG RX REV CODE 250 W/ 637 OVERRIDE(OP): Performed by: HOSPITALIST

## 2022-08-19 PROCEDURE — 82550 ASSAY OF CK (CPK): CPT

## 2022-08-19 PROCEDURE — 700111 HCHG RX REV CODE 636 W/ 250 OVERRIDE (IP): Performed by: INTERNAL MEDICINE

## 2022-08-19 PROCEDURE — 770006 HCHG ROOM/CARE - MED/SURG/GYN SEMI*

## 2022-08-19 PROCEDURE — 700102 HCHG RX REV CODE 250 W/ 637 OVERRIDE(OP): Performed by: EMERGENCY MEDICINE

## 2022-08-19 PROCEDURE — A9270 NON-COVERED ITEM OR SERVICE: HCPCS | Performed by: EMERGENCY MEDICINE

## 2022-08-19 PROCEDURE — A9270 NON-COVERED ITEM OR SERVICE: HCPCS | Performed by: INTERNAL MEDICINE

## 2022-08-19 PROCEDURE — 96376 TX/PRO/DX INJ SAME DRUG ADON: CPT

## 2022-08-19 PROCEDURE — 99223 1ST HOSP IP/OBS HIGH 75: CPT | Mod: AI | Performed by: INTERNAL MEDICINE

## 2022-08-19 PROCEDURE — 82962 GLUCOSE BLOOD TEST: CPT | Mod: 91

## 2022-08-19 PROCEDURE — 96366 THER/PROPH/DIAG IV INF ADDON: CPT

## 2022-08-19 PROCEDURE — A9270 NON-COVERED ITEM OR SERVICE: HCPCS | Performed by: HOSPITALIST

## 2022-08-19 PROCEDURE — 83605 ASSAY OF LACTIC ACID: CPT

## 2022-08-19 PROCEDURE — 80048 BASIC METABOLIC PNL TOTAL CA: CPT

## 2022-08-19 PROCEDURE — 700105 HCHG RX REV CODE 258: Performed by: INTERNAL MEDICINE

## 2022-08-19 PROCEDURE — 94760 N-INVAS EAR/PLS OXIMETRY 1: CPT

## 2022-08-19 PROCEDURE — 700111 HCHG RX REV CODE 636 W/ 250 OVERRIDE (IP): Performed by: EMERGENCY MEDICINE

## 2022-08-19 PROCEDURE — 85027 COMPLETE CBC AUTOMATED: CPT

## 2022-08-19 RX ORDER — LABETALOL HYDROCHLORIDE 5 MG/ML
10 INJECTION, SOLUTION INTRAVENOUS EVERY 4 HOURS PRN
Status: DISCONTINUED | OUTPATIENT
Start: 2022-08-19 | End: 2022-08-24 | Stop reason: HOSPADM

## 2022-08-19 RX ORDER — SODIUM CHLORIDE 9 MG/ML
INJECTION, SOLUTION INTRAVENOUS CONTINUOUS
Status: DISCONTINUED | OUTPATIENT
Start: 2022-08-19 | End: 2022-08-19

## 2022-08-19 RX ORDER — FLUCONAZOLE 200 MG/1
200 TABLET ORAL DAILY
Status: DISCONTINUED | OUTPATIENT
Start: 2022-08-19 | End: 2022-08-22

## 2022-08-19 RX ORDER — OXYCODONE HYDROCHLORIDE 5 MG/1
5 TABLET ORAL
Status: DISCONTINUED | OUTPATIENT
Start: 2022-08-19 | End: 2022-08-19

## 2022-08-19 RX ORDER — HYDROMORPHONE HYDROCHLORIDE 1 MG/ML
0.5 INJECTION, SOLUTION INTRAMUSCULAR; INTRAVENOUS; SUBCUTANEOUS
Status: DISCONTINUED | OUTPATIENT
Start: 2022-08-19 | End: 2022-08-24 | Stop reason: HOSPADM

## 2022-08-19 RX ORDER — MORPHINE SULFATE 4 MG/ML
4 INJECTION INTRAVENOUS
Status: DISCONTINUED | OUTPATIENT
Start: 2022-08-19 | End: 2022-08-19

## 2022-08-19 RX ORDER — SODIUM CHLORIDE 9 MG/ML
INJECTION, SOLUTION INTRAVENOUS ONCE
Status: COMPLETED | OUTPATIENT
Start: 2022-08-19 | End: 2022-08-19

## 2022-08-19 RX ORDER — HYDROMORPHONE HYDROCHLORIDE 1 MG/ML
1 INJECTION, SOLUTION INTRAMUSCULAR; INTRAVENOUS; SUBCUTANEOUS
Status: DISCONTINUED | OUTPATIENT
Start: 2022-08-19 | End: 2022-08-19

## 2022-08-19 RX ORDER — OXYCODONE HYDROCHLORIDE 10 MG/1
10 TABLET ORAL
Status: DISCONTINUED | OUTPATIENT
Start: 2022-08-19 | End: 2022-08-19

## 2022-08-19 RX ORDER — CIPROFLOXACIN 500 MG/1
500 TABLET, FILM COATED ORAL 2 TIMES DAILY
Status: ON HOLD | COMMUNITY
Start: 2022-08-09 | End: 2022-08-22

## 2022-08-19 RX ORDER — ONDANSETRON 2 MG/ML
4 INJECTION INTRAMUSCULAR; INTRAVENOUS EVERY 4 HOURS PRN
Status: DISCONTINUED | OUTPATIENT
Start: 2022-08-19 | End: 2022-08-24 | Stop reason: HOSPADM

## 2022-08-19 RX ORDER — ONDANSETRON 4 MG/1
4 TABLET, ORALLY DISINTEGRATING ORAL EVERY 4 HOURS PRN
Status: DISCONTINUED | OUTPATIENT
Start: 2022-08-19 | End: 2022-08-24 | Stop reason: HOSPADM

## 2022-08-19 RX ORDER — MORPHINE SULFATE 4 MG/ML
4 INJECTION INTRAVENOUS ONCE
Status: COMPLETED | OUTPATIENT
Start: 2022-08-19 | End: 2022-08-19

## 2022-08-19 RX ORDER — HYDROMORPHONE HYDROCHLORIDE 2 MG/1
2 TABLET ORAL
Status: DISCONTINUED | OUTPATIENT
Start: 2022-08-19 | End: 2022-08-24 | Stop reason: HOSPADM

## 2022-08-19 RX ADMIN — HYDROMORPHONE HYDROCHLORIDE 0.5 MG: 1 INJECTION, SOLUTION INTRAMUSCULAR; INTRAVENOUS; SUBCUTANEOUS at 15:07

## 2022-08-19 RX ADMIN — HYDROMORPHONE HYDROCHLORIDE 2 MG: 2 TABLET ORAL at 19:48

## 2022-08-19 RX ADMIN — PIPERACILLIN SODIUM AND TAZOBACTAM SODIUM 3.38 G: 3; 375 INJECTION, POWDER, FOR SOLUTION INTRAVENOUS at 11:11

## 2022-08-19 RX ADMIN — ONDANSETRON 4 MG: 2 INJECTION INTRAMUSCULAR; INTRAVENOUS at 08:28

## 2022-08-19 RX ADMIN — HYDROMORPHONE HYDROCHLORIDE 1 MG: 1 INJECTION, SOLUTION INTRAMUSCULAR; INTRAVENOUS; SUBCUTANEOUS at 07:26

## 2022-08-19 RX ADMIN — ONDANSETRON 4 MG: 2 INJECTION INTRAMUSCULAR; INTRAVENOUS at 15:12

## 2022-08-19 RX ADMIN — PIPERACILLIN SODIUM AND TAZOBACTAM SODIUM 3.38 G: 3; 375 INJECTION, POWDER, FOR SOLUTION INTRAVENOUS at 02:45

## 2022-08-19 RX ADMIN — HYDROMORPHONE HYDROCHLORIDE 1 MG: 1 INJECTION, SOLUTION INTRAMUSCULAR; INTRAVENOUS; SUBCUTANEOUS at 03:57

## 2022-08-19 RX ADMIN — ONDANSETRON 4 MG: 2 INJECTION INTRAMUSCULAR; INTRAVENOUS at 03:27

## 2022-08-19 RX ADMIN — PIPERACILLIN SODIUM AND TAZOBACTAM SODIUM 3.38 G: 3; 375 INJECTION, POWDER, FOR SOLUTION INTRAVENOUS at 19:29

## 2022-08-19 RX ADMIN — FLUCONAZOLE 200 MG: 200 TABLET ORAL at 00:15

## 2022-08-19 RX ADMIN — OXYCODONE HYDROCHLORIDE 10 MG: 10 TABLET ORAL at 11:10

## 2022-08-19 RX ADMIN — FLUCONAZOLE 200 MG: 200 TABLET ORAL at 20:09

## 2022-08-19 RX ADMIN — MORPHINE SULFATE 4 MG: 4 INJECTION INTRAVENOUS at 01:15

## 2022-08-19 RX ADMIN — OXYCODONE HYDROCHLORIDE 10 MG: 10 TABLET ORAL at 14:13

## 2022-08-19 RX ADMIN — SODIUM CHLORIDE: 9 INJECTION, SOLUTION INTRAVENOUS at 02:15

## 2022-08-19 ASSESSMENT — PATIENT HEALTH QUESTIONNAIRE - PHQ9
1. LITTLE INTEREST OR PLEASURE IN DOING THINGS: NOT AT ALL
SUM OF ALL RESPONSES TO PHQ9 QUESTIONS 1 AND 2: 0
2. FEELING DOWN, DEPRESSED, IRRITABLE, OR HOPELESS: NOT AT ALL

## 2022-08-19 ASSESSMENT — PAIN DESCRIPTION - PAIN TYPE
TYPE: ACUTE PAIN

## 2022-08-19 ASSESSMENT — ENCOUNTER SYMPTOMS
WEAKNESS: 0
FLANK PAIN: 1
COUGH: 0
TINGLING: 0
DIARRHEA: 0
STRIDOR: 0
CONSTIPATION: 1
NAUSEA: 0
PALPITATIONS: 0
HEADACHES: 0
VOMITING: 0
MYALGIAS: 0
FEVER: 0
CHILLS: 0
SPUTUM PRODUCTION: 0
SHORTNESS OF BREATH: 0
LOSS OF CONSCIOUSNESS: 0
FALLS: 0
ABDOMINAL PAIN: 1
DIZZINESS: 0
DEPRESSION: 0

## 2022-08-19 ASSESSMENT — COGNITIVE AND FUNCTIONAL STATUS - GENERAL
DAILY ACTIVITIY SCORE: 24
STANDING UP FROM CHAIR USING ARMS: A LITTLE
MOBILITY SCORE: 20
CLIMB 3 TO 5 STEPS WITH RAILING: A LITTLE
TURNING FROM BACK TO SIDE WHILE IN FLAT BAD: A LITTLE
SUGGESTED CMS G CODE MODIFIER DAILY ACTIVITY: CH
SUGGESTED CMS G CODE MODIFIER MOBILITY: CJ
WALKING IN HOSPITAL ROOM: A LITTLE

## 2022-08-19 ASSESSMENT — LIFESTYLE VARIABLES
HOW MANY TIMES IN THE PAST YEAR HAVE YOU HAD 5 OR MORE DRINKS IN A DAY: 0
ALCOHOL_USE: NO
TOTAL SCORE: 0
CONSUMPTION TOTAL: NEGATIVE
EVER HAD A DRINK FIRST THING IN THE MORNING TO STEADY YOUR NERVES TO GET RID OF A HANGOVER: NO
EVER FELT BAD OR GUILTY ABOUT YOUR DRINKING: NO
HAVE PEOPLE ANNOYED YOU BY CRITICIZING YOUR DRINKING: NO
AVERAGE NUMBER OF DAYS PER WEEK YOU HAVE A DRINK CONTAINING ALCOHOL: 0
ON A TYPICAL DAY WHEN YOU DRINK ALCOHOL HOW MANY DRINKS DO YOU HAVE: 0
TOTAL SCORE: 0
TOTAL SCORE: 0
HAVE YOU EVER FELT YOU SHOULD CUT DOWN ON YOUR DRINKING: NO

## 2022-08-19 ASSESSMENT — PAIN SCALES - WONG BAKER: WONGBAKER_NUMERICALRESPONSE: DOESN'T HURT AT ALL

## 2022-08-19 NOTE — ED NOTES
Fluid bolus complete   Repeat lactic collected & sent to lab  Bed in lowest position,call light w/in reach plan to admit

## 2022-08-19 NOTE — PROGRESS NOTES
"Hospital Medicine Daily Progress Note    Date of Service  8/19/2022    Chief Complaint  Francesco Schulte is a 72 y.o. male admitted 8/18/2022 with   Chief Complaint   Patient presents with    Chest Pain     L and R sided CP. Reports also SOB and dizziness.    Flank Pain     Bilateral flank pain, reports recent dx of UTI     Headache    Sent by MD     Asked to come to ER by MD for positive blood cultures \"that are resistant to his antibiotics for UTI\" per daughter.      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 Coli, last UTI grew Candida albicans.  Patient admitted 8/19/22 for recurrent UTI, started on IV zosyn.    Interval Problem Update  Patient complaining of severe pain to B/L thighs, lower abdomen.  Andres in place.    I have discussed this patient's plan of care and discharge plan at IDT rounds today with Case Management, Nursing, Nursing leadership, and other members of the IDT team.    Consultants/Specialty  none    Code Status  Full Code    Disposition  Patient is not medically cleared for discharge.   Anticipate discharge to  TBD .  I have placed the appropriate orders for post-discharge needs.    Review of Systems  ROS   Please see HPI    Physical Exam  Temp:  [36.9 °C (98.4 °F)-37 °C (98.6 °F)] 36.9 °C (98.4 °F)  Pulse:  [] 64  Resp:  [13-21] 19  BP: (129-153)/(77-95) 150/84  SpO2:  [96 %-99 %] 99 %    Physical Exam  Please see HPI      Fluids    Intake/Output Summary (Last 24 hours) at 8/19/2022 0714  Last data filed at 8/19/2022 0500  Gross per 24 hour   Intake --   Output 590 ml   Net -590 ml       Laboratory  Recent Labs     08/18/22  2135 08/19/22  0406   WBC 4.6* 3.5*   RBC 4.00* 3.41*   HEMOGLOBIN 15.5 13.2*   HEMATOCRIT 45.6 39.5*   .0* 115.8* "   MCH 38.8* 38.7*   MCHC 34.0 33.4*   RDW 70.0* 70.7*   PLATELETCT 173 145*   MPV 10.5 10.7     Recent Labs     08/18/22  2135 08/19/22  0406   SODIUM 139 139   POTASSIUM 4.0 4.0   CHLORIDE 106 106   CO2 21 23   GLUCOSE 204* 154*   BUN 16 15   CREATININE 0.84 0.85   CALCIUM 9.2 8.9                   Imaging  CT-ABDOMEN-PELVIS WITH   Final Result         1.  Mild fluid-filled prominence of the small bowel, appearance suggests ileus and/or enteritis, radiographic follow-up to resolution recommended as clinically appropriate.   2.  Hepatomegaly   3.  Diverticulosis   4.  Enlarged prostate, workup and evaluation for causes of prostate enlargement recommended as clinically appropriate   5.  Thickened bladder wall, could represent changes of bladder outlet obstruction or cystitis, correlate with urinalysis.   6.  Atherosclerosis and atherosclerotic coronary artery disease      DX-CHEST-PORTABLE (1 VIEW)   Final Result         1.  No acute cardiopulmonary disease.   2.  Atherosclerosis           Assessment/Plan  * Ileus (HCC)- (present on admission)  Assessment & Plan  - Noted on CT scan  -Associated with pain out of proportion on exam  -Keep patient strict n.p.o. for now  -Start as needed Dilaudid    Acute cystitis with hematuria- (present on admission)  Assessment & Plan  - Patient has failed outpatient ciprofloxacin  -Staph KAYLA on culture  -ERP discussed the case with pharmacy who recommended Zosyn, will continue  -Await cultures  -Not causing sepsis  -With elevated lactic acid level however no acidosis, while there is no sepsis this level was increased so I will repeat another level later in the morning but otherwise can monitor with BMP    Essential hypertension- (present on admission)  Assessment & Plan  - Start as needed labetalol  -Adjust as needed    Type 2 diabetes mellitus with hyperglycemia (HCC)- (present on admission)  Assessment & Plan  - Start insulin sliding scale  -Adjust as needed    Chronic systolic  heart failure (HCC)- (present on admission)  Assessment & Plan  - Patient at this time has no sign of fluid overload  -He does have an infection, will be strict n.p.o. and does have a worsening lactic acid level, because of this I am going to start IV fluids at a slow rate and give 1 L  -Closely monitor for overload    Paroxysmal A-fib (HCC)- (present on admission)  Assessment & Plan  - Currently in sinus rhythm  -Restart home Eliquis when able    Benign prostatic hyperplasia with urinary retention- (present on admission)  Assessment & Plan  - Restart Proscar and Flomax when able       Please see HPI, no changes made to current plan. Pending Ucx to tailor PO ABX. Last Ucx on 8/9/22 showed MSSA susceptible to PO Bactrim. Adjusted pain regimen, added PRN PO oxy with IV dilaudid.  Added CPK level. Continue on IV zosyn, may need MSSA coverage given last Ucx 8/9 results.    VTE prophylaxis: SCDs/TEDs    I have performed a physical exam and reviewed and updated ROS and Plan today (8/19/2022). In review of yesterday's note (8/18/2022), there are no changes except as documented above.

## 2022-08-19 NOTE — ED NOTES
Pt does not wish to have catheter inspected or bag changed out for UA sample collection   Will reassess once pain medication has had effect.  Bed in lowest position call light w/in reach.

## 2022-08-19 NOTE — CARE PLAN
The patient is Stable - Low risk of patient condition declining or worsening    Shift Goals  Clinical Goals: Control pain, maintain NPO status, treat UTI with IV Abx.  Patient Goals: pain control  Family Goals: KAL    Progress made toward(s) clinical / shift goals:    Problem: Pain - Standard  Goal: Alleviation of pain or a reduction in pain to the patient’s comfort goal  Outcome: Progressing  Note: Pain management with positioning and medication is effectively reducing pain levels.      Problem: Knowledge Deficit - Standard  Goal: Patient and family/care givers will demonstrate understanding of plan of care, disease process/condition, diagnostic tests and medications  Note: Ileus disease process and POC discussed. Pt verbalizes understanding.        Patient is not progressing towards the following goals:

## 2022-08-19 NOTE — PROGRESS NOTES
Pt up to unit via allan. PT Aox4 and reports 8/10 Pain. Pt updated on POC for the rest of the morning. Pt has no questions or further needs at this time.      Yes

## 2022-08-19 NOTE — ED PROVIDER NOTES
"ED Provider Note    Scribed for Hina Holman M.D. by Chikis Joseph. 8/18/2022  9:27 PM    Primary care provider: Tala Dunne M.D.  Means of arrival: Walk-in   History obtained from: Daughter   History limited by: Altered mental status and poor historian.     CHIEF COMPLAINT  Chief Complaint   Patient presents with    Chest Pain     L and R sided CP. Reports also SOB and dizziness.    Flank Pain     Bilateral flank pain, reports recent dx of UTI     Headache    Sent by MD     Asked to come to ER by MD for positive blood cultures \"that are resistant to his antibiotics for UTI\" per daughter.        HPI  Francesco Schulte is a 72 y.o. male who presents to the Emergency Department for evaluation of a urinary tract infection onset weeks ago. He was given an prescription for Cipro by his primary care physician and reports that he's been taking for weeks now. However, he was sent here by his MD for \"positive urine cultures that are resistant to Cipro\". Therefore, patient's UTI is still present and worsening. He admits to associated symptoms of bilateral flank pain, dysuria, hematuria, loss of appetite, occasional vomiting, sweating, chills, abdominal pain, and unstable gait, but denies fevers, bilateral leg swelling, or diarrhea. Patient is currently on eliquis. Patient denies any smoking or drinking. Per daughter, he has been having memory loss and increasing weakness for the past 18 months now.     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 Patient used to self cath at home, but had a permanent catheter placed about 3 weeks ago.     Patent reports chronic chest pain that is worsening with stress. Per daughter, " "patient has an extremely weak immune system. He has been in \"heart failure for quite some time now\" and is on many daily medications. Patient states he had \"half a dozen stents placed\".     History is limited secondary to patient's mental status    REVIEW OF SYSTEMS    See history of present illness. All other systems are negative. C.    ROS is limited secondary to mental status    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), Agent orange exposure, Anesthesia, Cancer ([facility]), Diabetes ([facility]), Family history of punctured lung (2002), Heart attack ([facility]) (03/2017), Hemorrhagic disorder ([facility]), High cholesterol, Ischemic cardiomyopathy, Kidney stones, Lupus ([facility]) (11/15/2019), Orthostatic hypotension (3/29/2018), Pain, Polycythemia vera(238.4), Stroke ([facility]) (2016), Urinary bladder disorder, and Vertigo.    SURGICAL HISTORY   has a past surgical history that includes other cardiac surgery; cath placement; laminotomy; appendectomy; carotid endarterectomy (Right, 3/21/2018); temporal artery biopsy (Right, 6/26/2018); incis/drain scrotum/testis,epididym (Right, 7/25/2020); explore scrotum (Right, 7/29/2020); explore scrotum (Right, 7/31/2020); skin abscess incision and drainage (Right, 8/3/2020); split thickness skin graft (N/A, 8/23/2020); aicd implant (07/29/2021); and jacqueline (N/A, 10/30/2021).    SOCIAL HISTORY  Social History     Tobacco Use    Smoking status: Never    Smokeless tobacco: Never   Vaping Use    Vaping Use: Never used   Substance Use Topics    Alcohol use: No    Drug use: No      Social History     Substance and Sexual Activity   Drug Use No       FAMILY HISTORY  Family History   Problem Relation Age of Onset    Ovarian Cancer Neg Hx     Diabetes Neg Hx        CURRENT MEDICATIONS  Home Medications       Reviewed by Karoline Salcedo R.N. (Registered Nurse) on 08/18/22 at 2125  Med List Status: Not Addressed     Medication Last Dose Status   acyclovir " "(ZOVIRAX) 800 MG Tab  Active   apixaban (ELIQUIS) 5mg Tab 8/18/2022 Active   dapagliflozin propanediol (FARXIGA) 10 MG Tab  Active   dicyclomine (BENTYL) 10 MG Cap  Active   finasteride (PROSCAR) 5 MG Tab  Active   glucose blood strip  Active   glucose blood strip  Active   hydroxyurea (HYDREA) 500 MG Cap 8/18/2022 Active   Insulin Degludec (TRESIBA FLEXTOUCH) 200 UNIT/ML Solution Pen-injector  Active   naproxen (NAPROSYN) 250 MG Tab  Active   nitroglycerin (NITROSTAT) 0.4 MG SL Tab  Active   tamsulosin (FLOMAX) 0.4 MG capsule 8/18/2022 Active                    ALLERGIES  Allergies   Allergen Reactions    Doxycycline      Swelling lips and difficulties swallowing    Atorvastatin      Pt and pts wife are not sure what happens     Beta Adrenergic Blockers Unspecified     dizziness    Eplerenone Unspecified     Intolerance, dehydration, altered mental status    Metformin Unspecified and Palpitations     Cardiac effects  \"Similar to a heart attack, I was hospitalized\"    Simvastatin      Other reaction(s): Other (Specify with Comments)  Myalgias  Myalgias    Spironolactone Palpitations    Statins [Hmg-Coa-R Inhibitors]      Muscle ache, joint pain       PHYSICAL EXAM  VITAL SIGNS: BP (!) 142/87   Pulse 88   Temp 37 °C (98.6 °F) (Temporal)   Resp 16   Ht 1.905 m (6' 3\")   Wt 76.1 kg (167 lb 12.3 oz)   SpO2 99%   BMI 20.97 kg/m²     Constitutional: Well developed, Non-toxic appearance. Cachectic. Chronically Ill appearing.   HEENT: Normocephalic, Atraumatic,  external ears normal, pharynx pink,  Mucous  Membranes moist, No rhinorrhea or mucosal edema  Eyes: PERRL, EOMI, Conjunctiva normal, No discharge.   Neck: Normal range of motion, No tenderness, Supple, No stridor.   Lymphatic: No lymphadenopathy    Cardiovascular: Regular Rate and Rhythm, No murmurs,  rubs, or gallops.   Thorax & Lungs: Lungs clear to auscultation bilaterally, No respiratory distress, No wheezes, rhales or rhonchi, No chest wall tenderness. "   Abdomen: Bowel sounds normal, Soft, Right mid/upper quadrant tenderness, non distended,  No pulsatile masses., no rebound guarding or peritoneal signs. Significant pain to palpation.   Skin: Warm, Dry, No erythema, No rash,   Back:  No CVA tenderness,  No spinal tenderness, bony crepitance, step offs, or instability.   Neurologic: Alert & oriented clear speech no focal deficits  Extremities: Equal, intact distal pulses, No cyanosis, clubbing No tenderness. Trace edema in bilateral lower extremities.   Musculoskeletal: Good range of motion in all major joints. No tenderness to palpation or major deformities noted.     DIAGNOSTIC STUDIES / PROCEDURES    LABS  Results for orders placed or performed during the hospital encounter of 08/18/22   LACTIC ACID   Result Value Ref Range    Lactic Acid 3.9 (H) 0.5 - 2.0 mmol/L   CBC WITH DIFFERENTIAL   Result Value Ref Range    WBC 4.6 (L) 4.8 - 10.8 K/uL    RBC 4.00 (L) 4.70 - 6.10 M/uL    Hemoglobin 15.5 14.0 - 18.0 g/dL    Hematocrit 45.6 42.0 - 52.0 %    .0 (H) 81.4 - 97.8 fL    MCH 38.8 (H) 27.0 - 33.0 pg    MCHC 34.0 33.7 - 35.3 g/dL    RDW 70.0 (H) 35.9 - 50.0 fL    Platelet Count 173 164 - 446 K/uL    MPV 10.5 9.0 - 12.9 fL    Neutrophils-Polys 75.20 (H) 44.00 - 72.00 %    Lymphocytes 13.00 (L) 22.00 - 41.00 %    Monocytes 8.10 0.00 - 13.40 %    Eosinophils 2.20 0.00 - 6.90 %    Basophils 1.30 0.00 - 1.80 %    Immature Granulocytes 0.20 0.00 - 0.90 %    Nucleated RBC 0.00 /100 WBC    Neutrophils (Absolute) 3.42 1.82 - 7.42 K/uL    Lymphs (Absolute) 0.59 (L) 1.00 - 4.80 K/uL    Monos (Absolute) 0.37 0.00 - 0.85 K/uL    Eos (Absolute) 0.10 0.00 - 0.51 K/uL    Baso (Absolute) 0.06 0.00 - 0.12 K/uL    Immature Granulocytes (abs) 0.01 0.00 - 0.11 K/uL    NRBC (Absolute) 0.00 K/uL    Anisocytosis 1+     Macrocytosis 1+    COMP METABOLIC PANEL   Result Value Ref Range    Sodium 139 135 - 145 mmol/L    Potassium 4.0 3.6 - 5.5 mmol/L    Chloride 106 96 - 112 mmol/L     Co2 21 20 - 33 mmol/L    Anion Gap 12.0 7.0 - 16.0    Glucose 204 (H) 65 - 99 mg/dL    Bun 16 8 - 22 mg/dL    Creatinine 0.84 0.50 - 1.40 mg/dL    Calcium 9.2 8.4 - 10.2 mg/dL    AST(SGOT) 20 12 - 45 U/L    ALT(SGPT) 21 2 - 50 U/L    Alkaline Phosphatase 67 30 - 99 U/L    Total Bilirubin 0.6 0.1 - 1.5 mg/dL    Albumin 3.5 3.2 - 4.9 g/dL    Total Protein 7.8 6.0 - 8.2 g/dL    Globulin 4.3 (H) 1.9 - 3.5 g/dL    A-G Ratio 0.8 g/dL   URINALYSIS    Specimen: Urine   Result Value Ref Range    Color Yellow     Character Hazy (A)     Specific Gravity 1.020 <1.035    Ph 6.0 5.0 - 8.0    Glucose Negative Negative mg/dL    Ketones Negative Negative mg/dL    Protein 30 (A) Negative mg/dL    Bilirubin Negative Negative    Nitrite Positive (A) Negative    Leukocyte Esterase Small (A) Negative    Occult Blood Large (A) Negative    Micro Urine Req Microscopic    TROPONIN   Result Value Ref Range    Troponin T 15 6 - 19 ng/L   LACTIC ACID   Result Value Ref Range    Lactic Acid 2.6 (H) 0.5 - 2.0 mmol/L   PLATELET ESTIMATE   Result Value Ref Range    Plt Estimation Normal    MORPHOLOGY   Result Value Ref Range    RBC Morphology Present    DIFFERENTIAL COMMENT   Result Value Ref Range    Comments-Diff see below    ESTIMATED GFR   Result Value Ref Range    GFR (CKD-EPI) 94 >60 mL/min/1.73 m 2   URINE MICROSCOPIC (W/UA)   Result Value Ref Range    WBC 20-50 (A) /hpf    RBC 10-20 (A) /hpf    Bacteria Moderate (A) None /hpf    Mucous Threads Few /hpf    Budding Yeast Present (A) Absent /hpf   EKG   Result Value Ref Range    Report       [facility] Emergency Dept.    Test Date:  2022  Pt Name:    MARZENA RMOEO             Department: [facility]  MRN:        [id number]                      [facility]  Gender:     Male                         Technician: [id number]  :        1950                   Requested By:ER TRIAGE PROTOCOL  Order #:    [id number]                    Ericka MD: AMAN MELISSA,  MD    Measurements  Intervals                                Axis  Rate:       92                           P:          67  IA:         170                          QRS:        -60  QRSD:       109                          T:          108  QT:         380  QTc:        471    Interpretive Statements  Sinus rhythm  Left anterior fascicular block  LVH with secondary repolarization abnormality  Anterior infarct, old  Compared to ECG 2022 18:27:12  Left anterior fascicular block now present  Left ventricular hypertrophy now present  Early repolarization now present  Sinus tachycardia no lo nger present  Left-axis deviation no longer present  Myocardial infarct finding still present  Electronically Signed On 2022 0:01:16 PDT by AMAN MELISSA MD       All labs reviewed by me.    EKG  12 lead EKG; Interpreted by emergency department physician     Report   Date Value Ref Range Status   2022       [facility] Emergency Dept.    Test Date:  2022  Pt Name:    MARZENA ROMEO             Department: [facility]  MRN:        [id number]                      [facility]  Gender:     Male                         Technician: [id number]  :        1950                   Requested By:ER TRIAGE PROTOCOL  Order #:    [id number]                    Reading MD: AMAN MELISSA MD    Measurements  Intervals                                Axis  Rate:       92                           P:          67  IA:         170                          QRS:        -60  QRSD:       109                          T:          108  QT:         380  QTc:        471    Interpretive Statements  Sinus rhythm  Left anterior fascicular block  LVH with secondary repolarization abnormality  Anterior infarct, old  Compared to ECG 2022 18:27:12  Left anterior fascicular block now present  Left ventricular hypertrophy now present  Early repolarization now present  Sinus tachycardia no longer present  Left-axis  deviation no longer present  Myocardial infarct finding still present  Electronically Signed On 8- 0:01:16 PDT by AMAN MELISSA MD               RADIOLOGY  CT-ABDOMEN-PELVIS WITH   Final Result         1.  Mild fluid-filled prominence of the small bowel, appearance suggests ileus and/or enteritis, radiographic follow-up to resolution recommended as clinically appropriate.   2.  Hepatomegaly   3.  Diverticulosis   4.  Enlarged prostate, workup and evaluation for causes of prostate enlargement recommended as clinically appropriate   5.  Thickened bladder wall, could represent changes of bladder outlet obstruction or cystitis, correlate with urinalysis.   6.  Atherosclerosis and atherosclerotic coronary artery disease      DX-CHEST-PORTABLE (1 VIEW)   Final Result         1.  No acute cardiopulmonary disease.   2.  Atherosclerosis        The radiologist's interpretation of all radiological studies have been reviewed by me.    COURSE & MEDICAL DECISION MAKING  Nursing notes, VS, PMSFHx reviewed in chart.    9:27 PM Patient seen and examined at bedside. Patient will be treated with Zosyn 3.375g, Zofran 4mg, and morphine 4mg. Intravenous fluids administered for abnormal fluid loss. Ordered EKG, CT abdomen pelvis w/, DX chest, lactic acid, platelet estimate, morphology, eGFR, troponin, CBC w/diff, CMP, UA, urine culture, and blood culture x2 to evaluate his symptoms. The differential diagnoses include but are not limited to: sepsis or resistant UTI. Discussed plan of care with patient. I informed them that labs and imaging will be ordered to evaluate symptoms. Patient is understanding and agreeable with plan.     HYDRATION: Based on the patient's presentation of Dehydration the patient was given IV fluids. IV Hydration was used because oral hydration was not adequate alone. Upon recheck following hydration, the patient was improved.    Heart Score is: 3    12:06 AM I also ordered Diflucan because the patient has  budding yeast in his urine.    12:47 AM the patient CT shows an ileus and large prostate and bladder wall thickening.  He has no surgical abdominal emergency.  I spoke with Dr. David who accepts the patient for admission.    FINAL IMPRESSION  1. Urinary tract infection associated with indwelling urethral catheter, subsequent encounter    2. Generalized abdominal pain          Chikis REDD (Scriblauren), am scribing for, and in the presence of, Hina Holman M.D..    Electronically signed by: Chikis Joseph (Scrdayana), 8/18/2022    Hina REDD M.D. personally performed the services described in this documentation, as scribed by Chikis Joseph in my presence, and it is both accurate and complete. C.     The note accurately reflects work and decisions made by me.  Hina Holman M.D.  8/19/2022  12:48 AM

## 2022-08-19 NOTE — ASSESSMENT & PLAN NOTE
-Noted on CT scan  -Associated with pain out of proportion on exam  -bowel regimen ordered  -He has had 3 small bowel movements however still feels distended  Fleet enema, GoLytely

## 2022-08-19 NOTE — ED NOTES
Andres bag switched out UA sample obtained.   Pt requesting more pain medication pt remains tender to touch on lower abd fluids infusing ERP aware no new orders CT to come get pt

## 2022-08-19 NOTE — ASSESSMENT & PLAN NOTE
- Start insulin sliding scale  -Adjust as needed   Plan: Follow up every 2 years if under the age of 60 and without any hx of skin cancer or atypical moles/nevi or every year or sooner if over the age of 60 and or a hx of Skin Cancer. Detail Level: Zone Initiate Treatment: Elta MD 46 spf.  A teaspoon to the face and shot-glass to the body every two hours is what is recommended. Initiate Treatment: Pt will use Elta MD sunscreen 46 spf to sun exposed areas.

## 2022-08-19 NOTE — ASSESSMENT & PLAN NOTE
- Patient at this time has no sign of fluid overload  -Continue Eliquis, no other blood pressure medications

## 2022-08-19 NOTE — ED NOTES
Pt did no wish to have blood glucose checked at this time pending room for admission pt in stale condition

## 2022-08-19 NOTE — TELEPHONE ENCOUNTER
I have called and spoke to Dionicio regarding message below.  He has notified me, that Staph aureus, isolated from the urine culture is resistant to ciprofloxacin.  Initial report did not include sensitivity to ciprofloxacin unfortunately.    He has also contacted the patient and recommended him to present to emergency room for blood cultures to be drawn and for IV ABX (concern for bloodstream infection in the settings of history of endocarditis).    I have also called patient around 7 PM I have counseled the patient, that Stafph aureus isolated from his urine culture is resistant to ciprofloxacin and he would require alternative antibiotic. Due to concern for MSSA bacteremia-this would need to be parenteral antibiotic.  Patient was advised to present to emergency room for evaluation, blood culture draw, IV antibiotic and infectious disease consultation.    Patient and his daughter Charla were both on the phone, all questions were answered to the best of my knowledge and to their satisfaction.  Patient tells me they will be at [facility] emergency room in approximately 2 hours from now ([facility] [place] [facility] [place]

## 2022-08-19 NOTE — H&P
"Hospital Medicine History & Physical Note    Date of Service  8/19/2022    Primary Care Physician  Tala Dunne M.D.    Consultants  None    Specialist Names: None    Code Status  Full Code    Chief Complaint  Chief Complaint   Patient presents with    Abdominal Pain    Flank Pain     Bilateral flank pain, reports recent dx of UTI     Headache    Sent by MD     Asked to come to ER by MD for positive blood cultures \"that are resistant to his antibiotics for UTI\" per daughter.        History of Presenting Illness  Francesco Schulte is a 72 y.o. male who presented 8/18/2022 with abdominal pain.  Patient states he has generalized abdominal pain, has become severe with any amount of pressure.  During exam, listening with the stethoscope was painful for him.  He states this initially started a couple of months ago but has been worsening.  He also is also been diagnosed with a UTI, initially placed on ciprofloxacin however the bacteria is resistant.  Upon arrival, noted to have continued urinary tract infection.  He does complain of bilateral flank pain as well as dysuria.  Patient does have a history of CHF.  I did discuss the case including labs and imaging with the ER physician.    I discussed the plan of care with patient.    Review of Systems  Review of Systems   Constitutional:  Negative for chills, fever and malaise/fatigue.   HENT:  Negative for congestion.    Respiratory:  Negative for cough, sputum production, shortness of breath and stridor.    Cardiovascular:  Negative for chest pain, palpitations and leg swelling.   Gastrointestinal:  Positive for abdominal pain and constipation. Negative for diarrhea, nausea and vomiting.   Genitourinary:  Positive for flank pain. Negative for dysuria and urgency.   Musculoskeletal:  Negative for falls and myalgias.   Neurological:  Negative for dizziness, tingling, loss of consciousness, weakness and headaches.   Psychiatric/Behavioral:  Negative for " "depression and suicidal ideas.    All other systems reviewed and are negative.    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), Agent orange exposure, Anesthesia, Cancer ([facility]), Diabetes ([facility]), Family history of punctured lung (2002), Heart attack ([facility]) (03/2017), Hemorrhagic disorder ([facility]), High cholesterol, Ischemic cardiomyopathy, Kidney stones, Lupus ([facility]) (11/15/2019), Orthostatic hypotension (3/29/2018), Pain, Polycythemia vera(238.4), Stroke ([facility]) (2016), Urinary bladder disorder, and Vertigo.    Surgical History   has a past surgical history that includes other cardiac surgery; cath placement; laminotomy; appendectomy; carotid endarterectomy (Right, 3/21/2018); temporal artery biopsy (Right, 6/26/2018); pr incis/drain scrotum/testis,epididym (Right, 7/25/2020); pr explore scrotum (Right, 7/29/2020); pr explore scrotum (Right, 7/31/2020); skin abscess incision and drainage (Right, 8/3/2020); split thickness skin graft (N/A, 8/23/2020); aicd implant (07/29/2021); and jacqueline (N/A, 10/30/2021).     Family History  family history is not on file.   Family history reviewed with patient. There is no family history that is pertinent to the chief complaint.     Social History   reports that he has never smoked. He has never used smokeless tobacco. He reports that he does not drink alcohol and does not use drugs.    Allergies  Allergies   Allergen Reactions    Doxycycline      Swelling lips and difficulties swallowing    Atorvastatin      Pt and pts wife are not sure what happens     Beta Adrenergic Blockers Unspecified     dizziness    Eplerenone Unspecified     Intolerance, dehydration, altered mental status    Metformin Unspecified and Palpitations     Cardiac effects  \"Similar to a heart attack, I was hospitalized\"    Simvastatin      Other reaction(s): Other (Specify with Comments)  Myalgias  Myalgias    Spironolactone Palpitations    Statins [Hmg-Coa-R Inhibitors]      " Muscle ache, joint pain       Medications  Prior to Admission Medications   Prescriptions Last Dose Informant Patient Reported? Taking?   Insulin Degludec (TRESIBA FLEXTOUCH) 200 UNIT/ML Solution Pen-injector   No No   Sig: Inject 12 Units under the skin every day for 90 days.   acyclovir (ZOVIRAX) 800 MG Tab   No No   Sig: Take 1 Tablet by mouth every day.   apixaban (ELIQUIS) 5mg Tab 2022 at 1000  Yes No   Sig: Eliquis 5 mg tablet   TAKE 1 TABLET BY MOUTH TWICE A DAY   dapagliflozin propanediol (FARXIGA) 10 MG Tab  Patient's Home Pharmacy Yes No   Sig: Take 5 mg by mouth every day.   dicyclomine (BENTYL) 10 MG Cap   Yes No   Sig: dicyclomine 10 mg capsule   TAKE 1 TABLET BY MOUTH EVERY 6 HOURS AS NEEDED FOR ABDOMINAL CRAMPS AND/OR DISCOMFORT   finasteride (PROSCAR) 5 MG Tab  Patient's Home Pharmacy No No   Sig: Take 1 Tablet by mouth every day.   glucose blood strip  Patient's Home Pharmacy No No   Si Strip by Other route as needed.   glucose blood strip   Yes No   Sig: OneTouch Verio test strips   1 STRIP BY OTHER ROUTE AS NEEDED.   hydroxyurea (HYDREA) 500 MG Cap 2022 at 1000 Patient's Home Pharmacy Yes No   Sig: Take 500 mg by mouth 2 Times a Day.   naproxen (NAPROSYN) 250 MG Tab   Yes No   Sig: naproxen 250 mg tablet   750 mg by oral route.   nitroglycerin (NITROSTAT) 0.4 MG SL Tab  Patient's Home Pharmacy No No   Sig: Place 1 Tablet under the tongue as needed for Chest Pain (or hypertension >180/110).   tamsulosin (FLOMAX) 0.4 MG capsule 2022 at 1000 Patient's Home Pharmacy No No   Sig: Take 1 Capsule by mouth every day.      Facility-Administered Medications: None       Physical Exam  Temp:  [37 °C (98.6 °F)] 37 °C (98.6 °F)  Pulse:  [] 67  Resp:  [14-21] 14  BP: (129-153)/(77-95) 153/92  SpO2:  [97 %-99 %] 97 %  Blood Pressure : (!) 153/92   Temperature: 37 °C (98.6 °F)   Pulse: 67   Respiration: 14   Pulse Oximetry: 97 %       Physical Exam  Vitals and nursing note reviewed.    Constitutional:       General: He is not in acute distress.     Appearance: He is well-developed. He is not toxic-appearing or diaphoretic.   HENT:      Head: Normocephalic and atraumatic.      Right Ear: External ear normal.      Left Ear: External ear normal.      Nose: Nose normal. No congestion or rhinorrhea.      Mouth/Throat:      Mouth: Mucous membranes are dry.      Pharynx: No oropharyngeal exudate.   Eyes:      General:         Right eye: No discharge.         Left eye: No discharge.   Neck:      Trachea: No tracheal deviation.   Cardiovascular:      Rate and Rhythm: Normal rate and regular rhythm.      Heart sounds: No murmur heard.    No friction rub. No gallop.   Pulmonary:      Effort: Pulmonary effort is normal. No respiratory distress.      Breath sounds: Normal breath sounds. No stridor. No wheezing or rales.   Chest:      Chest wall: No tenderness.   Abdominal:      General: Bowel sounds are decreased. There is no distension.      Palpations: Abdomen is soft.      Tenderness: There is generalized abdominal tenderness.   Musculoskeletal:         General: No tenderness. Normal range of motion.      Cervical back: Normal range of motion and neck supple. No edema or erythema.      Right lower leg: No edema.      Left lower leg: No edema.   Lymphadenopathy:      Cervical: No cervical adenopathy.   Skin:     General: Skin is warm and dry.      Findings: No erythema or rash.   Neurological:      Mental Status: He is alert and oriented to person, place, and time.      Cranial Nerves: No cranial nerve deficit.   Psychiatric:         Mood and Affect: Mood normal.         Behavior: Behavior normal.         Thought Content: Thought content normal.         Judgment: Judgment normal.       Laboratory:  Recent Labs     08/18/22 2135   WBC 4.6*   RBC 4.00*   HEMOGLOBIN 15.5   HEMATOCRIT 45.6   .0*   MCH 38.8*   MCHC 34.0   RDW 70.0*   PLATELETCT 173   MPV 10.5     Recent Labs     08/18/22  2135   SODIUM  139   POTASSIUM 4.0   CHLORIDE 106   CO2 21   GLUCOSE 204*   BUN 16   CREATININE 0.84   CALCIUM 9.2     Recent Labs     08/18/22  2135   ALTSGPT 21   ASTSGOT 20   ALKPHOSPHAT 67   TBILIRUBIN 0.6   GLUCOSE 204*         No results for input(s): NTPROBNP in the last 72 hours.      Recent Labs     08/18/22 2135   TROPONINT 15       Imaging:  CT-ABDOMEN-PELVIS WITH   Final Result         1.  Mild fluid-filled prominence of the small bowel, appearance suggests ileus and/or enteritis, radiographic follow-up to resolution recommended as clinically appropriate.   2.  Hepatomegaly   3.  Diverticulosis   4.  Enlarged prostate, workup and evaluation for causes of prostate enlargement recommended as clinically appropriate   5.  Thickened bladder wall, could represent changes of bladder outlet obstruction or cystitis, correlate with urinalysis.   6.  Atherosclerosis and atherosclerotic coronary artery disease      DX-CHEST-PORTABLE (1 VIEW)   Final Result         1.  No acute cardiopulmonary disease.   2.  Atherosclerosis          X-Ray:  I have personally reviewed the images and compared with prior images.    Assessment/Plan:  Justification for Admission Status  I anticipate this patient will require at least two midnights for appropriate medical management, necessitating inpatient admission because ileus as well as urinary tract infection    Patient will need a Med/Surg bed on MEDICAL service .  The need is secondary to ileus and UTI.    * Ileus (HCC)- (present on admission)  Assessment & Plan  - Noted on CT scan  -Associated with pain out of proportion on exam  -Keep patient strict n.p.o. for now  -Start as needed Dilaudid    Chronic systolic heart failure (HCC)- (present on admission)  Assessment & Plan  - Patient at this time has no sign of fluid overload  -He does have an infection, will be strict n.p.o. and does have a worsening lactic acid level, because of this I am going to start IV fluids at a slow rate and give 1  L  -Closely monitor for overload    Acute cystitis with hematuria- (present on admission)  Assessment & Plan  - Patient has failed outpatient ciprofloxacin  -Staph KAYLA on culture  -ERP discussed the case with pharmacy who recommended Zosyn, will continue  -Await cultures  -Not causing sepsis  -With elevated lactic acid level however no acidosis, while there is no sepsis this level was increased so I will repeat another level later in the morning but otherwise can monitor with BMP    Essential hypertension- (present on admission)  Assessment & Plan  - Start as needed labetalol  -Adjust as needed    Type 2 diabetes mellitus with hyperglycemia (HCC)- (present on admission)  Assessment & Plan  - Start insulin sliding scale  -Adjust as needed    Paroxysmal A-fib (HCC)- (present on admission)  Assessment & Plan  - Currently in sinus rhythm  -Restart home Eliquis when able    Benign prostatic hyperplasia with urinary retention- (present on admission)  Assessment & Plan  - Restart Proscar and Flomax when able      VTE prophylaxis: SCDs/TEDs

## 2022-08-19 NOTE — TELEPHONE ENCOUNTER
[facility] -[phone]    Staff aurous resistant to cipro due   Hematogenous feeding     Pt has hx of infective endocarditis As well as MSSA not blood culture to R/O     Asked pt to return to ED to receive Blood culture

## 2022-08-19 NOTE — PROGRESS NOTES
Report was received from Viviana JORGENSEN. Pt was found alert and oriented x4. Pt complains of pain. Medication was provided. No acute distress at this time. Continuing NPO status due to ileus. Will continue to monitor.

## 2022-08-19 NOTE — CARE PLAN
The patient is Stable - Low risk of patient condition declining or worsening    Shift Goals  Clinical Goals: Control Pain  Patient Goals: Rest and get rid of pain  Family Goals: KAL    Progress made toward(s) clinical / shift goals:    Problem: Pain - Standard  Goal: Alleviation of pain or a reduction in pain to the patient’s comfort goal  Outcome: Progressing: pt pain relieved with PRN medication      Problem: Knowledge Deficit - Standard  Goal: Patient and family/care givers will demonstrate understanding of plan of care, disease process/condition, diagnostic tests and medications  Outcome: Progressing: pt verbalized understanding of POC     Problem: Fall Risk  Goal: Patient will remain free from falls  Outcome: Progressing: pt oriented to fall precautions.

## 2022-08-19 NOTE — ASSESSMENT & PLAN NOTE
- Patient failed outpatient ciprofloxacin, presented to ED with prior Andres catheter which caused patient's UTI, this Andres was exchanged prior to being admitted.  -ERP discussed the case with pharmacy who recommended Zosyn   -BCx NGTD  - Last Ucx on 8/9/22 showed MSSA susceptible to PO Bactrim.   - Adjusted pain regimen, added PRN PO oxy with IV dilaudid.   -Continue on IV zosyn for UCx growing Corynebacterium striatum group and Staph aureus MSSA.  Transitioned to Augmentin, will complete 10 days total

## 2022-08-19 NOTE — HOSPITAL COURSE
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 Coli, last UTI grew Candida albicans.  Patient admitted 8/19/22 for recurrent UTI, started on IV zosyn.

## 2022-08-20 PROBLEM — N39.0 URINARY TRACT INFECTION ASSOCIATED WITH INDWELLING URETHRAL CATHETER (HCC): Status: ACTIVE | Noted: 2022-05-31

## 2022-08-20 PROBLEM — B02.29 POST HERPETIC NEURALGIA: Status: ACTIVE | Noted: 2022-08-20

## 2022-08-20 PROBLEM — T83.511A URINARY TRACT INFECTION ASSOCIATED WITH INDWELLING URETHRAL CATHETER (HCC): Status: ACTIVE | Noted: 2022-05-31

## 2022-08-20 LAB
GLUCOSE BLD STRIP.AUTO-MCNC: 112 MG/DL (ref 65–99)
GLUCOSE BLD STRIP.AUTO-MCNC: 124 MG/DL (ref 65–99)
GLUCOSE BLD STRIP.AUTO-MCNC: 90 MG/DL (ref 65–99)

## 2022-08-20 PROCEDURE — 700102 HCHG RX REV CODE 250 W/ 637 OVERRIDE(OP): Performed by: INTERNAL MEDICINE

## 2022-08-20 PROCEDURE — A9270 NON-COVERED ITEM OR SERVICE: HCPCS | Performed by: HOSPITALIST

## 2022-08-20 PROCEDURE — 82962 GLUCOSE BLOOD TEST: CPT | Mod: 91

## 2022-08-20 PROCEDURE — A9270 NON-COVERED ITEM OR SERVICE: HCPCS | Performed by: INTERNAL MEDICINE

## 2022-08-20 PROCEDURE — 700105 HCHG RX REV CODE 258: Performed by: INTERNAL MEDICINE

## 2022-08-20 PROCEDURE — 700111 HCHG RX REV CODE 636 W/ 250 OVERRIDE (IP): Performed by: INTERNAL MEDICINE

## 2022-08-20 PROCEDURE — 700102 HCHG RX REV CODE 250 W/ 637 OVERRIDE(OP): Performed by: HOSPITALIST

## 2022-08-20 PROCEDURE — 770006 HCHG ROOM/CARE - MED/SURG/GYN SEMI*

## 2022-08-20 PROCEDURE — 99233 SBSQ HOSP IP/OBS HIGH 50: CPT | Performed by: INTERNAL MEDICINE

## 2022-08-20 PROCEDURE — 94760 N-INVAS EAR/PLS OXIMETRY 1: CPT

## 2022-08-20 RX ORDER — HYDROMORPHONE HYDROCHLORIDE 2 MG/1
1 TABLET ORAL
Status: DISCONTINUED | OUTPATIENT
Start: 2022-08-20 | End: 2022-08-24 | Stop reason: HOSPADM

## 2022-08-20 RX ORDER — PREGABALIN 75 MG/1
75 CAPSULE ORAL 3 TIMES DAILY
Status: DISCONTINUED | OUTPATIENT
Start: 2022-08-20 | End: 2022-08-22

## 2022-08-20 RX ADMIN — APIXABAN 5 MG: 5 TABLET, FILM COATED ORAL at 05:13

## 2022-08-20 RX ADMIN — HYDROMORPHONE HYDROCHLORIDE 2 MG: 2 TABLET ORAL at 00:20

## 2022-08-20 RX ADMIN — APIXABAN 5 MG: 5 TABLET, FILM COATED ORAL at 18:33

## 2022-08-20 RX ADMIN — HYDROMORPHONE HYDROCHLORIDE 0.5 MG: 1 INJECTION, SOLUTION INTRAMUSCULAR; INTRAVENOUS; SUBCUTANEOUS at 20:49

## 2022-08-20 RX ADMIN — HYDROMORPHONE HYDROCHLORIDE 2 MG: 2 TABLET ORAL at 15:48

## 2022-08-20 RX ADMIN — HYDROMORPHONE HYDROCHLORIDE 2 MG: 2 TABLET ORAL at 05:14

## 2022-08-20 RX ADMIN — PREGABALIN 75 MG: 75 CAPSULE ORAL at 11:52

## 2022-08-20 RX ADMIN — PIPERACILLIN SODIUM AND TAZOBACTAM SODIUM 3.38 G: 3; 375 INJECTION, POWDER, FOR SOLUTION INTRAVENOUS at 18:34

## 2022-08-20 RX ADMIN — PIPERACILLIN SODIUM AND TAZOBACTAM SODIUM 3.38 G: 3; 375 INJECTION, POWDER, FOR SOLUTION INTRAVENOUS at 11:52

## 2022-08-20 RX ADMIN — HYDROMORPHONE HYDROCHLORIDE 0.5 MG: 1 INJECTION, SOLUTION INTRAMUSCULAR; INTRAVENOUS; SUBCUTANEOUS at 23:44

## 2022-08-20 RX ADMIN — HYDROMORPHONE HYDROCHLORIDE 2 MG: 2 TABLET ORAL at 08:24

## 2022-08-20 RX ADMIN — PIPERACILLIN SODIUM AND TAZOBACTAM SODIUM 3.38 G: 3; 375 INJECTION, POWDER, FOR SOLUTION INTRAVENOUS at 04:00

## 2022-08-20 RX ADMIN — HYDROMORPHONE HYDROCHLORIDE 2 MG: 2 TABLET ORAL at 11:59

## 2022-08-20 RX ADMIN — FLUCONAZOLE 200 MG: 200 TABLET ORAL at 20:49

## 2022-08-20 RX ADMIN — PREGABALIN 75 MG: 75 CAPSULE ORAL at 18:33

## 2022-08-20 RX ADMIN — HYDROMORPHONE HYDROCHLORIDE 2 MG: 2 TABLET ORAL at 18:33

## 2022-08-20 ASSESSMENT — ENCOUNTER SYMPTOMS
NAUSEA: 0
DIZZINESS: 0
ABDOMINAL PAIN: 0
HEADACHES: 0
SHORTNESS OF BREATH: 0
COUGH: 0
WEAKNESS: 1
CONSTIPATION: 0
DIARRHEA: 0
VOMITING: 0
FEVER: 0
BACK PAIN: 0
PALPITATIONS: 0
CHILLS: 0

## 2022-08-20 ASSESSMENT — CHA2DS2 SCORE
DIABETES: YES
AGE 65 TO 74: YES
PRIOR STROKE OR TIA OR THROMBOEMBOLISM: YES
SEX: MALE
CHF OR LEFT VENTRICULAR DYSFUNCTION: YES
HYPERTENSION: YES
AGE 75 OR GREATER: NO
VASCULAR DISEASE: YES
CHA2DS2 VASC SCORE: 7

## 2022-08-20 ASSESSMENT — FIBROSIS 4 INDEX: FIB4 SCORE: 2.11

## 2022-08-20 ASSESSMENT — PAIN DESCRIPTION - PAIN TYPE
TYPE: ACUTE PAIN
TYPE: ACUTE PAIN

## 2022-08-20 NOTE — PROGRESS NOTES
"Hospital Medicine Daily Progress Note    Date of Service  8/20/2022    Chief Complaint  Francesco Schulte is a 72 y.o. male admitted 8/18/2022 with   Chief Complaint   Patient presents with    Chest Pain     L and R sided CP. Reports also SOB and dizziness.    Flank Pain     Bilateral flank pain, reports recent dx of UTI     Headache    Sent by MD     Asked to come to ER by MD for positive blood cultures \"that are resistant to his antibiotics for UTI\" per daughter.      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 Coli, last UTI grew Candida albicans.  Patient admitted 8/19/22 for recurrent UTI, started on IV zosyn.    Interval Problem Update  Patient stated he had ongoing pain over lower abdominal skin area.  He did not mention he had a rash before.  Looked at area and patient reported then he had shingles 3 weeks ago.    Tried to call spouse Mrs. Alysa Schulte.    I have discussed this patient's plan of care and discharge plan at IDT rounds today with Case Management, Nursing, Nursing leadership, and other members of the IDT team.    Consultants/Specialty  none    Code Status  Full Code    Disposition  Patient is not medically cleared for discharge.   Anticipate discharge to  TBD .  I have placed the appropriate orders for post-discharge needs.    Review of Systems  Review of Systems   Constitutional:  Positive for malaise/fatigue. Negative for chills and fever.   Respiratory:  Negative for cough and shortness of breath.    Cardiovascular:  Negative for chest pain and palpitations.   Gastrointestinal:  Negative for abdominal pain, constipation, diarrhea, nausea and vomiting.   Musculoskeletal:  Negative for back pain and joint pain.   Skin:  Positive for rash.        Pain over " rash area   Neurological:  Positive for weakness. Negative for dizziness and headaches.   All other systems reviewed and are negative.     Physical Exam  Temp:  [36.5 °C (97.7 °F)-37.1 °C (98.8 °F)] 37 °C (98.6 °F)  Pulse:  [48-71] 59  Resp:  [16-19] 19  BP: (120-143)/(69-76) 122/69  SpO2:  [93 %-98 %] 95 %    Physical Exam  Vitals and nursing note reviewed.   Constitutional:       General: He is not in acute distress.     Comments: Frail, thin appearing elderly male   HENT:      Head: Normocephalic and atraumatic.      Comments: Temporal muscle wasting noted     Nose: Nose normal. No congestion.   Eyes:      General: No scleral icterus.     Extraocular Movements: Extraocular movements intact.   Cardiovascular:      Rate and Rhythm: Normal rate and regular rhythm.      Pulses: Normal pulses.      Heart sounds: Normal heart sounds. No murmur heard.  Pulmonary:      Effort: Pulmonary effort is normal. No respiratory distress.      Breath sounds: Normal breath sounds.   Abdominal:      General: Abdomen is flat. Bowel sounds are normal. There is no distension.      Palpations: Abdomen is soft.   Genitourinary:     Comments: Andres in place  Musculoskeletal:         General: No swelling.      Cervical back: Normal range of motion and neck supple.      Comments: Sarcopenic. B/L dorsal hands visible muscle atrophy.   Skin:     General: Skin is warm.      Capillary Refill: Capillary refill takes less than 2 seconds.      Coloration: Skin is not jaundiced.      Findings: Rash (crusted lesions over suprapubic area extending infraumbilical, right side of T12- L1 dermatome area.) present.   Neurological:      Mental Status: He is alert and oriented to person, place, and time. Mental status is at baseline.      Motor: Weakness present.   Psychiatric:         Mood and Affect: Mood normal.         Behavior: Behavior normal.         Thought Content: Thought content normal.         Judgment: Judgment normal.          Fluids    Intake/Output Summary (Last 24 hours) at 8/20/2022 1634  Last data filed at 8/20/2022 1400  Gross per 24 hour   Intake 540 ml   Output 1950 ml   Net -1410 ml       Laboratory  Recent Labs     08/18/22 2135 08/19/22  0406   WBC 4.6* 3.5*   RBC 4.00* 3.41*   HEMOGLOBIN 15.5 13.2*   HEMATOCRIT 45.6 39.5*   .0* 115.8*   MCH 38.8* 38.7*   MCHC 34.0 33.4*   RDW 70.0* 70.7*   PLATELETCT 173 145*   MPV 10.5 10.7     Recent Labs     08/18/22 2135 08/19/22  0406   SODIUM 139 139   POTASSIUM 4.0 4.0   CHLORIDE 106 106   CO2 21 23   GLUCOSE 204* 154*   BUN 16 15   CREATININE 0.84 0.85   CALCIUM 9.2 8.9                   Imaging  CT-ABDOMEN-PELVIS WITH   Final Result         1.  Mild fluid-filled prominence of the small bowel, appearance suggests ileus and/or enteritis, radiographic follow-up to resolution recommended as clinically appropriate.   2.  Hepatomegaly   3.  Diverticulosis   4.  Enlarged prostate, workup and evaluation for causes of prostate enlargement recommended as clinically appropriate   5.  Thickened bladder wall, could represent changes of bladder outlet obstruction or cystitis, correlate with urinalysis.   6.  Atherosclerosis and atherosclerotic coronary artery disease      DX-CHEST-PORTABLE (1 VIEW)   Final Result         1.  No acute cardiopulmonary disease.   2.  Atherosclerosis           Assessment/Plan  * Ileus (HCC)- (present on admission)  Assessment & Plan  -Noted on CT scan  -Associated with pain out of proportion on exam  -Trial clear liquid  -Continue pain regimen    Urinary tract infection associated with indwelling urethral catheter (HCC)- (present on admission)  Assessment & Plan  - Patient has failed outpatient ciprofloxacin, presented to ED with prior Andres catheter which caused patient's UTI, this Andres was exchanged prior to being admitted.  -ERP discussed the case with pharmacy who recommended Zosyn  -Not causing sepsis  -With elevated lactic acid level however no  acidosis, while there is no sepsis this level was increased so I will repeat another level later in the morning but otherwise can monitor with BMP  -BCx NGTD  - Pending Ucx to tailor PO ABX. Last Ucx on 8/9/22 showed MSSA susceptible to PO Bactrim.   - Adjusted pain regimen, added PRN PO oxy with IV dilaudid.   -Continue on IV zosyn, may need MSSA coverage given last Ucx 8/9 results.    Post herpetic neuralgia- (present on admission)  Assessment & Plan  Distribution from T12-L1, he stated he had 3 weeks ago.  Picture above.  Now has ongoing neuralgic pain  Will trial pregabalin 75mg TID    Essential hypertension- (present on admission)  Assessment & Plan  - Start as needed labetalol  -Adjust as needed    Type 2 diabetes mellitus with hyperglycemia (HCC)- (present on admission)  Assessment & Plan  - Start insulin sliding scale  -Adjust as needed    Chronic systolic heart failure (HCC)- (present on admission)  Assessment & Plan  - Patient at this time has no sign of fluid overload  -He does have an infection, will be strict n.p.o. and does have a worsening lactic acid level, because of this I am going to start IV fluids at a slow rate and give 1 L  -Closely monitor for overload    Paroxysmal A-fib (HCC)- (present on admission)  Assessment & Plan  - Currently in sinus rhythm  -Restart home Eliquis    Benign prostatic hyperplasia with urinary retention- (present on admission)  Assessment & Plan  - Continue Proscar and Flomax when able     VTE prophylaxis: therapeutic anticoagulation with Eliquis    I have performed a physical exam and reviewed and updated ROS and Plan today (8/20/2022). In review of yesterday's note (8/19/2022), there are no changes except as documented above.

## 2022-08-20 NOTE — ASSESSMENT & PLAN NOTE
Distribution from T12-L1, he stated he had 3 weeks ago.  Picture above.  Now has ongoing neuralgic pain  Increase pregabalin to 100mg TID, has improved pain control

## 2022-08-20 NOTE — CARE PLAN
The patient is Stable - Low risk of patient condition declining or worsening    Shift Goals  Clinical Goals: Pain management, abx therapy.  Patient Goals: Pain control  Family Goals: KAL    Progress made toward(s) clinical / shift goals:    Problem: Pain - Standard  Goal: Alleviation of pain or a reduction in pain to the patient’s comfort goal  8/20/2022 1214 by Iam Espinoza R.N.  Outcome: Not Progressing  Note: Pain is managed with medication and repositioning. MD added Lyrica for nerve pain. Comfort goal has not yet been achieved.   8/20/2022 1057 by BRIANNE CazaresN.  Outcome: Progressing  Note: Pain is managed with med       Patient is not progressing towards the following goals:      Problem: Pain - Standard  Goal: Alleviation of pain or a reduction in pain to the patient’s comfort goal  8/20/2022 1214 by STEPHY Cazares.N.  Outcome: Not Progressing  Note: Pain is managed with medication and repositioning. MD added Lyrica for nerve pain. Comfort goal has not yet been achieved.   8/20/2022 1057 by STEPHY Cazares.N.  Outcome: Progressing  Note: Pain is managed with med

## 2022-08-20 NOTE — CARE PLAN
Spoke with patient she stated that she did call the pharmacy for a refill of VIRTUSSIN. Patient has been taking it since 5/21/19. She states she needs just a little more that she is doing much better now. She does not need a full bottle. She has no breathing issues at this time and is no longer needing her inhaler, no wheezing. The cough is productive, but now sputum is clear. She stated the phlegm is not longer in her chest but she feels it at back of her throat and she is having issues when lying down. She verified that she has not been taking it with Hydrocodone    Patient states no further questions at this time. The patient is Stable - Low risk of patient condition declining or worsening    Shift Goals  Clinical Goals: Pain Management, Clear Liquids, ABT  Patient Goals: Pain control  Family Goals: KAL    Progress made toward(s) clinical / shift goals:   Problem: Pain - Standard  Goal: Alleviation of pain or a reduction in pain to the patient’s comfort goal  Outcome: Progressing     Problem: Knowledge Deficit - Standard  Goal: Patient and family/care givers will demonstrate understanding of plan of care, disease process/condition, diagnostic tests and medications  Outcome: Progressing     Problem: Fall Risk  Goal: Patient will remain free from falls  Outcome: Progressing       Patient is not progressing towards the following goals:

## 2022-08-21 LAB
ALBUMIN SERPL BCP-MCNC: 3 G/DL (ref 3.2–4.9)
BUN SERPL-MCNC: 8 MG/DL (ref 8–22)
CALCIUM SERPL-MCNC: 8.9 MG/DL (ref 8.4–10.2)
CHLORIDE SERPL-SCNC: 101 MMOL/L (ref 96–112)
CO2 SERPL-SCNC: 20 MMOL/L (ref 20–33)
CREAT SERPL-MCNC: 0.91 MG/DL (ref 0.5–1.4)
ERYTHROCYTE [DISTWIDTH] IN BLOOD BY AUTOMATED COUNT: 67.2 FL (ref 35.9–50)
GFR SERPLBLD CREATININE-BSD FMLA CKD-EPI: 91 ML/MIN/1.73 M 2
GLUCOSE BLD STRIP.AUTO-MCNC: 276 MG/DL (ref 65–99)
GLUCOSE BLD STRIP.AUTO-MCNC: 93 MG/DL (ref 65–99)
GLUCOSE SERPL-MCNC: 87 MG/DL (ref 65–99)
HCT VFR BLD AUTO: 44.2 % (ref 42–52)
HGB BLD-MCNC: 15 G/DL (ref 14–18)
MAGNESIUM SERPL-MCNC: 1.9 MG/DL (ref 1.5–2.5)
MCH RBC QN AUTO: 39.2 PG (ref 27–33)
MCHC RBC AUTO-ENTMCNC: 33.9 G/DL (ref 33.7–35.3)
MCV RBC AUTO: 115.4 FL (ref 81.4–97.8)
PHOSPHATE SERPL-MCNC: 3.8 MG/DL (ref 2.5–4.5)
PLATELET # BLD AUTO: 130 K/UL (ref 164–446)
PMV BLD AUTO: 10.7 FL (ref 9–12.9)
POTASSIUM SERPL-SCNC: 3.6 MMOL/L (ref 3.6–5.5)
PROCALCITONIN SERPL-MCNC: 0.04 NG/ML
RBC # BLD AUTO: 3.83 M/UL (ref 4.7–6.1)
SODIUM SERPL-SCNC: 134 MMOL/L (ref 135–145)
WBC # BLD AUTO: 3.6 K/UL (ref 4.8–10.8)

## 2022-08-21 PROCEDURE — A9270 NON-COVERED ITEM OR SERVICE: HCPCS | Performed by: INTERNAL MEDICINE

## 2022-08-21 PROCEDURE — 700105 HCHG RX REV CODE 258: Performed by: INTERNAL MEDICINE

## 2022-08-21 PROCEDURE — 85027 COMPLETE CBC AUTOMATED: CPT

## 2022-08-21 PROCEDURE — 700111 HCHG RX REV CODE 636 W/ 250 OVERRIDE (IP): Performed by: INTERNAL MEDICINE

## 2022-08-21 PROCEDURE — 94760 N-INVAS EAR/PLS OXIMETRY 1: CPT

## 2022-08-21 PROCEDURE — 770006 HCHG ROOM/CARE - MED/SURG/GYN SEMI*

## 2022-08-21 PROCEDURE — 80069 RENAL FUNCTION PANEL: CPT

## 2022-08-21 PROCEDURE — 82962 GLUCOSE BLOOD TEST: CPT | Mod: 91

## 2022-08-21 PROCEDURE — 99232 SBSQ HOSP IP/OBS MODERATE 35: CPT | Performed by: INTERNAL MEDICINE

## 2022-08-21 PROCEDURE — 700102 HCHG RX REV CODE 250 W/ 637 OVERRIDE(OP): Performed by: INTERNAL MEDICINE

## 2022-08-21 PROCEDURE — 84145 PROCALCITONIN (PCT): CPT

## 2022-08-21 PROCEDURE — 36415 COLL VENOUS BLD VENIPUNCTURE: CPT

## 2022-08-21 PROCEDURE — 83735 ASSAY OF MAGNESIUM: CPT

## 2022-08-21 RX ADMIN — PREGABALIN 75 MG: 75 CAPSULE ORAL at 12:41

## 2022-08-21 RX ADMIN — FLUCONAZOLE 200 MG: 200 TABLET ORAL at 20:09

## 2022-08-21 RX ADMIN — APIXABAN 5 MG: 5 TABLET, FILM COATED ORAL at 17:10

## 2022-08-21 RX ADMIN — PREGABALIN 75 MG: 75 CAPSULE ORAL at 17:10

## 2022-08-21 RX ADMIN — APIXABAN 5 MG: 5 TABLET, FILM COATED ORAL at 05:52

## 2022-08-21 RX ADMIN — PIPERACILLIN SODIUM AND TAZOBACTAM SODIUM 3.38 G: 3; 375 INJECTION, POWDER, FOR SOLUTION INTRAVENOUS at 12:41

## 2022-08-21 RX ADMIN — PIPERACILLIN SODIUM AND TAZOBACTAM SODIUM 3.38 G: 3; 375 INJECTION, POWDER, FOR SOLUTION INTRAVENOUS at 20:39

## 2022-08-21 RX ADMIN — PIPERACILLIN SODIUM AND TAZOBACTAM SODIUM 3.38 G: 3; 375 INJECTION, POWDER, FOR SOLUTION INTRAVENOUS at 03:20

## 2022-08-21 RX ADMIN — PREGABALIN 75 MG: 75 CAPSULE ORAL at 05:52

## 2022-08-21 RX ADMIN — INSULIN HUMAN 3 UNITS: 100 INJECTION, SOLUTION PARENTERAL at 17:17

## 2022-08-21 ASSESSMENT — ENCOUNTER SYMPTOMS
FEVER: 0
CHILLS: 0
BACK PAIN: 0
SHORTNESS OF BREATH: 0
DIZZINESS: 0
NAUSEA: 0
HEADACHES: 0
DIARRHEA: 0
PALPITATIONS: 0
WEAKNESS: 1
COUGH: 0
ABDOMINAL PAIN: 0
CONSTIPATION: 0
VOMITING: 0

## 2022-08-21 ASSESSMENT — FIBROSIS 4 INDEX: FIB4 SCORE: 2.35

## 2022-08-21 ASSESSMENT — PAIN DESCRIPTION - PAIN TYPE
TYPE: ACUTE PAIN
TYPE: ACUTE PAIN

## 2022-08-21 NOTE — PROGRESS NOTES
"[facility] Medicine Daily Progress Note    Date of Service  8/21/2022    Chief Complaint  Francesco Schulte is a 72 y.o. male admitted 8/18/2022 with   Chief Complaint   Patient presents with    Chest Pain     L and R sided CP. Reports also SOB and dizziness.    Flank Pain     Bilateral flank pain, reports recent dx of UTI     Headache    Sent by MD     Asked to come to ER by MD for positive blood cultures \"that are resistant to his antibiotics for UTI\" per daughter.      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 Coli, last UTI grew Candida albicans.  Patient admitted 8/19/22 for recurrent UTI, started on IV zosyn.    Interval Problem Update  Patient stated he had better control of his skin pain from post herpetic neuralgia with pregabalin.   He stated he wanted to try increasing his diet from clear liquids.    Called spouse Mrs. Alysa Schulte. Gave updates.    I have discussed this patient's plan of care and discharge plan at IDT rounds today with Case Management, Nursing, Nursing leadership, and other members of the IDT team.    Consultants/Specialty  none    Code Status  Full Code    Disposition  Patient is not medically cleared for discharge.   Anticipate discharge to  TBD .  I have placed the appropriate orders for post-discharge needs.    Review of Systems  Review of Systems   Constitutional:  Positive for malaise/fatigue. Negative for chills and fever.   Respiratory:  Negative for cough and shortness of breath.    Cardiovascular:  Negative for chest pain and palpitations.   Gastrointestinal:  Negative for abdominal pain, constipation, diarrhea, nausea and vomiting.   Musculoskeletal:  Negative for back pain and joint pain.   Skin:  Positive for rash.        Pain over rash " area   Neurological:  Positive for weakness. Negative for dizziness and headaches.   All other systems reviewed and are negative.     Physical Exam  Temp:  [36.4 °C (97.6 °F)-37.2 °C (99 °F)] 36.4 °C (97.6 °F)  Pulse:  [59-70] 68  Resp:  [18-19] 18  BP: (122-143)/(69-77) 143/77  SpO2:  [95 %-98 %] 96 %    Physical Exam  Vitals and nursing note reviewed.   Constitutional:       General: He is not in acute distress.     Comments: Frail, thin appearing elderly male   HENT:      Head: Normocephalic and atraumatic.      Comments: Temporal muscle wasting noted     Nose: Nose normal. No congestion.   Eyes:      General: No scleral icterus.     Extraocular Movements: Extraocular movements intact.   Cardiovascular:      Rate and Rhythm: Normal rate and regular rhythm.      Pulses: Normal pulses.      Heart sounds: Normal heart sounds. No murmur heard.  Pulmonary:      Effort: Pulmonary effort is normal. No respiratory distress.      Breath sounds: Normal breath sounds.   Abdominal:      General: Abdomen is flat. Bowel sounds are normal. There is no distension.      Palpations: Abdomen is soft.   Genitourinary:     Comments: Andres in place  Musculoskeletal:         General: No swelling.      Cervical back: Normal range of motion and neck supple.      Comments: Sarcopenic. B/L dorsal hands visible muscle atrophy.   Skin:     General: Skin is warm.      Capillary Refill: Capillary refill takes less than 2 seconds.      Coloration: Skin is not jaundiced.      Findings: Rash (crusted lesions over suprapubic area extending infraumbilical, right side of T12- L1 dermatome area.) present.   Neurological:      Mental Status: He is alert and oriented to person, place, and time. Mental status is at baseline.      Motor: Weakness present.   Psychiatric:         Mood and Affect: Mood normal.         Behavior: Behavior normal.         Thought Content: Thought content normal.         Judgment: Judgment normal.          Fluids    Intake/Output Summary (Last 24 hours) at 8/21/2022 1427  Last data filed at 8/21/2022 1200  Gross per 24 hour   Intake 940 ml   Output 3300 ml   Net -2360 ml       Laboratory  Recent Labs     08/18/22 2135 08/19/22  0406 08/21/22  0430   WBC 4.6* 3.5* 3.6*   RBC 4.00* 3.41* 3.83*   HEMOGLOBIN 15.5 13.2* 15.0   HEMATOCRIT 45.6 39.5* 44.2   .0* 115.8* 115.4*   MCH 38.8* 38.7* 39.2*   MCHC 34.0 33.4* 33.9   RDW 70.0* 70.7* 67.2*   PLATELETCT 173 145* 130*   MPV 10.5 10.7 10.7     Recent Labs     08/18/22 2135 08/19/22 0406 08/21/22  0430   SODIUM 139 139 134*   POTASSIUM 4.0 4.0 3.6   CHLORIDE 106 106 101   CO2 21 23 20   GLUCOSE 204* 154* 87   BUN 16 15 8   CREATININE 0.84 0.85 0.91   CALCIUM 9.2 8.9 8.9                   Imaging  CT-ABDOMEN-PELVIS WITH   Final Result         1.  Mild fluid-filled prominence of the small bowel, appearance suggests ileus and/or enteritis, radiographic follow-up to resolution recommended as clinically appropriate.   2.  Hepatomegaly   3.  Diverticulosis   4.  Enlarged prostate, workup and evaluation for causes of prostate enlargement recommended as clinically appropriate   5.  Thickened bladder wall, could represent changes of bladder outlet obstruction or cystitis, correlate with urinalysis.   6.  Atherosclerosis and atherosclerotic coronary artery disease      DX-CHEST-PORTABLE (1 VIEW)   Final Result         1.  No acute cardiopulmonary disease.   2.  Atherosclerosis           Assessment/Plan  * Ileus (HCC)- (present on admission)  Assessment & Plan  -Noted on CT scan  -Associated with pain out of proportion on exam  -Trial clear liquid  -Continue pain regimen    Urinary tract infection associated with indwelling urethral catheter (HCC)- (present on admission)  Assessment & Plan  - Patient has failed outpatient ciprofloxacin, presented to ED with prior Andres catheter which caused patient's UTI, this Andres was exchanged prior to being admitted.  -ERP  discussed the case with pharmacy who recommended Zosyn  -Not causing sepsis  -With elevated lactic acid level however no acidosis, while there is no sepsis this level was increased so I will repeat another level later in the morning but otherwise can monitor with BMP  -BCx NGTD  - Pending Ucx to tailor PO ABX. Last Ucx on 8/9/22 showed MSSA susceptible to PO Bactrim.   - Adjusted pain regimen, added PRN PO oxy with IV dilaudid.   -Continue on IV zosyn, may need MSSA coverage given last Ucx 8/9 results.    Post herpetic neuralgia- (present on admission)  Assessment & Plan  Distribution from T12-L1, he stated he had 3 weeks ago.  Picture above.  Now has ongoing neuralgic pain  Will trial pregabalin 75mg TID, has improved pain control    Essential hypertension- (present on admission)  Assessment & Plan  - Start as needed labetalol  -Adjust as needed    Type 2 diabetes mellitus with hyperglycemia (HCC)- (present on admission)  Assessment & Plan  - Start insulin sliding scale  -Adjust as needed    Chronic systolic heart failure (HCC)- (present on admission)  Assessment & Plan  - Patient at this time has no sign of fluid overload  -He does have an infection, will be strict n.p.o. and does have a worsening lactic acid level, because of this I am going to start IV fluids at a slow rate and give 1 L  -Closely monitor for overload    Paroxysmal A-fib (HCC)- (present on admission)  Assessment & Plan  - Currently in sinus rhythm  -Restart home Eliquis    Benign prostatic hyperplasia with urinary retention- (present on admission)  Assessment & Plan  - Continue Proscar and Flomax when able     VTE prophylaxis: therapeutic anticoagulation with Eliquis    I have performed a physical exam and reviewed and updated ROS and Plan today (8/21/2022). In review of yesterday's note (8/20/2022), there are no changes except as documented above.

## 2022-08-22 LAB
ALBUMIN SERPL BCP-MCNC: 2.9 G/DL (ref 3.2–4.9)
BACTERIA UR CULT: ABNORMAL
BUN SERPL-MCNC: 13 MG/DL (ref 8–22)
CALCIUM SERPL-MCNC: 9.1 MG/DL (ref 8.4–10.2)
CHLORIDE SERPL-SCNC: 104 MMOL/L (ref 96–112)
CO2 SERPL-SCNC: 24 MMOL/L (ref 20–33)
CREAT SERPL-MCNC: 1.03 MG/DL (ref 0.5–1.4)
ERYTHROCYTE [DISTWIDTH] IN BLOOD BY AUTOMATED COUNT: 66.8 FL (ref 35.9–50)
GFR SERPLBLD CREATININE-BSD FMLA CKD-EPI: 78 ML/MIN/1.73 M 2
GLUCOSE BLD STRIP.AUTO-MCNC: 127 MG/DL (ref 65–99)
GLUCOSE BLD STRIP.AUTO-MCNC: 169 MG/DL (ref 65–99)
GLUCOSE BLD STRIP.AUTO-MCNC: 230 MG/DL (ref 65–99)
GLUCOSE BLD STRIP.AUTO-MCNC: 265 MG/DL (ref 65–99)
GLUCOSE SERPL-MCNC: 129 MG/DL (ref 65–99)
HCT VFR BLD AUTO: 44 % (ref 42–52)
HGB BLD-MCNC: 14.9 G/DL (ref 14–18)
MAGNESIUM SERPL-MCNC: 2 MG/DL (ref 1.5–2.5)
MCH RBC QN AUTO: 39.1 PG (ref 27–33)
MCHC RBC AUTO-ENTMCNC: 33.9 G/DL (ref 33.7–35.3)
MCV RBC AUTO: 115.5 FL (ref 81.4–97.8)
PHOSPHATE SERPL-MCNC: 2.7 MG/DL (ref 2.5–4.5)
PLATELET # BLD AUTO: 134 K/UL (ref 164–446)
PMV BLD AUTO: 11.5 FL (ref 9–12.9)
POTASSIUM SERPL-SCNC: 4.1 MMOL/L (ref 3.6–5.5)
RBC # BLD AUTO: 3.81 M/UL (ref 4.7–6.1)
SIGNIFICANT IND 70042: ABNORMAL
SITE SITE: ABNORMAL
SODIUM SERPL-SCNC: 139 MMOL/L (ref 135–145)
SOURCE SOURCE: ABNORMAL
WBC # BLD AUTO: 3.2 K/UL (ref 4.8–10.8)

## 2022-08-22 PROCEDURE — 770006 HCHG ROOM/CARE - MED/SURG/GYN SEMI*

## 2022-08-22 PROCEDURE — 82962 GLUCOSE BLOOD TEST: CPT | Mod: 91

## 2022-08-22 PROCEDURE — 700105 HCHG RX REV CODE 258: Performed by: INTERNAL MEDICINE

## 2022-08-22 PROCEDURE — 94760 N-INVAS EAR/PLS OXIMETRY 1: CPT

## 2022-08-22 PROCEDURE — 85027 COMPLETE CBC AUTOMATED: CPT

## 2022-08-22 PROCEDURE — 700102 HCHG RX REV CODE 250 W/ 637 OVERRIDE(OP): Performed by: INTERNAL MEDICINE

## 2022-08-22 PROCEDURE — 99232 SBSQ HOSP IP/OBS MODERATE 35: CPT | Performed by: INTERNAL MEDICINE

## 2022-08-22 PROCEDURE — 700111 HCHG RX REV CODE 636 W/ 250 OVERRIDE (IP): Performed by: INTERNAL MEDICINE

## 2022-08-22 PROCEDURE — 36415 COLL VENOUS BLD VENIPUNCTURE: CPT

## 2022-08-22 PROCEDURE — A9270 NON-COVERED ITEM OR SERVICE: HCPCS | Performed by: INTERNAL MEDICINE

## 2022-08-22 PROCEDURE — 83735 ASSAY OF MAGNESIUM: CPT

## 2022-08-22 PROCEDURE — 80069 RENAL FUNCTION PANEL: CPT

## 2022-08-22 RX ORDER — AMOXICILLIN AND CLAVULANATE POTASSIUM 875; 125 MG/1; MG/1
1 TABLET, FILM COATED ORAL EVERY 12 HOURS
Status: COMPLETED | OUTPATIENT
Start: 2022-08-22 | End: 2022-08-24

## 2022-08-22 RX ORDER — AMOXICILLIN AND CLAVULANATE POTASSIUM 875; 125 MG/1; MG/1
1 TABLET, FILM COATED ORAL EVERY 12 HOURS
Qty: 6 TABLET | Refills: 0 | Status: SHIPPED | OUTPATIENT
Start: 2022-08-23 | End: 2022-08-26 | Stop reason: SDUPTHER

## 2022-08-22 RX ORDER — BISACODYL 10 MG
10 SUPPOSITORY, RECTAL RECTAL DAILY
Status: DISCONTINUED | OUTPATIENT
Start: 2022-08-22 | End: 2022-08-24 | Stop reason: HOSPADM

## 2022-08-22 RX ORDER — AMOXICILLIN 250 MG
2 CAPSULE ORAL 2 TIMES DAILY
Status: DISCONTINUED | OUTPATIENT
Start: 2022-08-22 | End: 2022-08-24 | Stop reason: HOSPADM

## 2022-08-22 RX ORDER — TAMSULOSIN HYDROCHLORIDE 0.4 MG/1
0.4 CAPSULE ORAL DAILY
Status: DISCONTINUED | OUTPATIENT
Start: 2022-08-22 | End: 2022-08-24 | Stop reason: HOSPADM

## 2022-08-22 RX ORDER — POLYETHYLENE GLYCOL 3350 17 G/17G
1 POWDER, FOR SOLUTION ORAL
Status: DISCONTINUED | OUTPATIENT
Start: 2022-08-22 | End: 2022-08-24 | Stop reason: HOSPADM

## 2022-08-22 RX ORDER — PREGABALIN 100 MG/1
100 CAPSULE ORAL 3 TIMES DAILY
Qty: 90 CAPSULE | Refills: 0 | Status: SHIPPED | OUTPATIENT
Start: 2022-08-22 | End: 2022-08-25 | Stop reason: SDUPTHER

## 2022-08-22 RX ORDER — FINASTERIDE 5 MG/1
5 TABLET, FILM COATED ORAL
Status: DISCONTINUED | OUTPATIENT
Start: 2022-08-22 | End: 2022-08-24 | Stop reason: HOSPADM

## 2022-08-22 RX ORDER — PREGABALIN 100 MG/1
100 CAPSULE ORAL 3 TIMES DAILY
Status: DISCONTINUED | OUTPATIENT
Start: 2022-08-22 | End: 2022-08-24 | Stop reason: HOSPADM

## 2022-08-22 RX ADMIN — APIXABAN 5 MG: 5 TABLET, FILM COATED ORAL at 17:36

## 2022-08-22 RX ADMIN — SENNOSIDES AND DOCUSATE SODIUM 2 TABLET: 50; 8.6 TABLET ORAL at 17:35

## 2022-08-22 RX ADMIN — PREGABALIN 75 MG: 75 CAPSULE ORAL at 06:20

## 2022-08-22 RX ADMIN — PREGABALIN 100 MG: 100 CAPSULE ORAL at 17:36

## 2022-08-22 RX ADMIN — POLYETHYLENE GLYCOL 3350 1 PACKET: 17 POWDER, FOR SOLUTION ORAL at 20:44

## 2022-08-22 RX ADMIN — INSULIN HUMAN 1 UNITS: 100 INJECTION, SOLUTION PARENTERAL at 23:44

## 2022-08-22 RX ADMIN — INSULIN HUMAN 3 UNITS: 100 INJECTION, SOLUTION PARENTERAL at 11:15

## 2022-08-22 RX ADMIN — TAMSULOSIN HYDROCHLORIDE 0.4 MG: 0.4 CAPSULE ORAL at 17:35

## 2022-08-22 RX ADMIN — PIPERACILLIN SODIUM AND TAZOBACTAM SODIUM 3.38 G: 3; 375 INJECTION, POWDER, FOR SOLUTION INTRAVENOUS at 02:57

## 2022-08-22 RX ADMIN — FINASTERIDE 5 MG: 5 TABLET, FILM COATED ORAL at 17:36

## 2022-08-22 RX ADMIN — PREGABALIN 75 MG: 75 CAPSULE ORAL at 11:09

## 2022-08-22 RX ADMIN — PIPERACILLIN SODIUM AND TAZOBACTAM SODIUM 3.38 G: 3; 375 INJECTION, POWDER, FOR SOLUTION INTRAVENOUS at 11:09

## 2022-08-22 RX ADMIN — INSULIN HUMAN 2 UNITS: 100 INJECTION, SOLUTION PARENTERAL at 17:38

## 2022-08-22 RX ADMIN — AMOXICILLIN AND CLAVULANATE POTASSIUM 1 TABLET: 875; 125 TABLET, FILM COATED ORAL at 17:36

## 2022-08-22 RX ADMIN — APIXABAN 5 MG: 5 TABLET, FILM COATED ORAL at 06:20

## 2022-08-22 ASSESSMENT — ENCOUNTER SYMPTOMS
CONSTIPATION: 0
HEADACHES: 0
SHORTNESS OF BREATH: 0
COUGH: 0
FEVER: 0
DIARRHEA: 0
WEAKNESS: 1
CHILLS: 0
VOMITING: 0
DIZZINESS: 0
ABDOMINAL PAIN: 0
BACK PAIN: 0
PALPITATIONS: 0
NAUSEA: 0

## 2022-08-22 ASSESSMENT — PAIN DESCRIPTION - PAIN TYPE
TYPE: ACUTE PAIN
TYPE: ACUTE PAIN

## 2022-08-22 NOTE — PROGRESS NOTES
Received report from NOC RN. Assumed patient care.  Pain tolerable per patient.  Assisted patient to restroom, c/o of constipation, will medicate per MAR.  Andres in place. Updated with plan of care.

## 2022-08-22 NOTE — PROGRESS NOTES
"[facility] Medicine Daily Progress Note    Date of Service  8/22/2022    Chief Complaint  Francesco Schulte is a 72 y.o. male admitted 8/18/2022 with   Chief Complaint   Patient presents with    Chest Pain     L and R sided CP. Reports also SOB and dizziness.    Flank Pain     Bilateral flank pain, reports recent dx of UTI     Headache    Sent by MD     Asked to come to ER by MD for positive blood cultures \"that are resistant to his antibiotics for UTI\" per daughter.      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 Coli, last UTI grew Candida albicans.  Patient admitted 8/19/22 for recurrent UTI, started on IV zosyn.    Interval Problem Update  Patient stated he has some \"pockets of pain\" that occur once Pregabalin wears off. Increasing dosage.  Patient feels constipated today, adding bowel regimen. Tolerating GI soft diet, advancing.  Completing Zosyn today, last dose ends at 11pm.    Called spouse Mrs. Alysa Schulte. Gave updates.    I have discussed this patient's plan of care and discharge plan at IDT rounds today with Case Management, Nursing, Nursing leadership, and other members of the IDT team.    Consultants/Specialty  none    Code Status  Full Code    Disposition  Patient is not medically cleared for discharge.   Anticipate discharge to  TBD .  I have placed the appropriate orders for post-discharge needs.    Review of Systems  Review of Systems   Constitutional:  Positive for malaise/fatigue. Negative for chills and fever.   Respiratory:  Negative for cough and shortness of breath.    Cardiovascular:  Negative for chest pain and palpitations.   Gastrointestinal:  Negative for abdominal pain, constipation, diarrhea, nausea and vomiting.   Musculoskeletal:  Negative for " back pain and joint pain.   Skin:  Positive for rash.        Pain over rash area   Neurological:  Positive for weakness. Negative for dizziness and headaches.   All other systems reviewed and are negative.     Physical Exam  Temp:  [36.4 °C (97.5 °F)-36.8 °C (98.3 °F)] 36.8 °C (98.3 °F)  Pulse:  [] 80  Resp:  [16-18] 18  BP: ()/(69-79) 121/79  SpO2:  [95 %-99 %] 97 %    Physical Exam  Vitals and nursing note reviewed.   Constitutional:       General: He is not in acute distress.     Comments: Frail, thin appearing elderly male   HENT:      Head: Normocephalic and atraumatic.      Comments: Temporal muscle wasting noted     Nose: Nose normal. No congestion.   Eyes:      General: No scleral icterus.     Extraocular Movements: Extraocular movements intact.   Cardiovascular:      Rate and Rhythm: Normal rate and regular rhythm.      Pulses: Normal pulses.      Heart sounds: Normal heart sounds. No murmur heard.  Pulmonary:      Effort: Pulmonary effort is normal. No respiratory distress.      Breath sounds: Normal breath sounds.   Abdominal:      General: Abdomen is flat. Bowel sounds are normal. There is no distension.      Palpations: Abdomen is soft.   Genitourinary:     Comments: Andres in place  Musculoskeletal:         General: No swelling.      Cervical back: Normal range of motion and neck supple.      Comments: Sarcopenic. B/L dorsal hands visible muscle atrophy.   Skin:     General: Skin is warm.      Capillary Refill: Capillary refill takes less than 2 seconds.      Coloration: Skin is not jaundiced.      Findings: Rash (crusted lesions over suprapubic area extending infraumbilical, right side of T12- L1 dermatome area.) present.   Neurological:      Mental Status: He is alert and oriented to person, place, and time. Mental status is at baseline.      Motor: Weakness present.   Psychiatric:         Mood and Affect: Mood normal.         Behavior: Behavior normal.         Thought Content: Thought  content normal.         Judgment: Judgment normal.         Fluids    Intake/Output Summary (Last 24 hours) at 8/22/2022 1448  Last data filed at 8/22/2022 1320  Gross per 24 hour   Intake 820 ml   Output 600 ml   Net 220 ml       Laboratory  Recent Labs     08/21/22  0430 08/22/22  0114   WBC 3.6* 3.2*   RBC 3.83* 3.81*   HEMOGLOBIN 15.0 14.9   HEMATOCRIT 44.2 44.0   .4* 115.5*   MCH 39.2* 39.1*   MCHC 33.9 33.9   RDW 67.2* 66.8*   PLATELETCT 130* 134*   MPV 10.7 11.5     Recent Labs     08/21/22  0430 08/22/22  0114   SODIUM 134* 139   POTASSIUM 3.6 4.1   CHLORIDE 101 104   CO2 20 24   GLUCOSE 87 129*   BUN 8 13   CREATININE 0.91 1.03   CALCIUM 8.9 9.1                   Imaging  CT-ABDOMEN-PELVIS WITH   Final Result         1.  Mild fluid-filled prominence of the small bowel, appearance suggests ileus and/or enteritis, radiographic follow-up to resolution recommended as clinically appropriate.   2.  Hepatomegaly   3.  Diverticulosis   4.  Enlarged prostate, workup and evaluation for causes of prostate enlargement recommended as clinically appropriate   5.  Thickened bladder wall, could represent changes of bladder outlet obstruction or cystitis, correlate with urinalysis.   6.  Atherosclerosis and atherosclerotic coronary artery disease      DX-CHEST-PORTABLE (1 VIEW)   Final Result         1.  No acute cardiopulmonary disease.   2.  Atherosclerosis           Assessment/Plan  * Ileus (HCC)- (present on admission)  Assessment & Plan  -Noted on CT scan  -Associated with pain out of proportion on exam  -bowel regimen ordered    Urinary tract infection associated with indwelling urethral catheter (HCC)- (present on admission)  Assessment & Plan  - Patient has failed outpatient ciprofloxacin, presented to ED with prior Andres catheter which caused patient's UTI, this Andres was exchanged prior to being admitted.  -ERP discussed the case with pharmacy who recommended Zosyn  -Not causing sepsis  -With elevated lactic  acid level however no acidosis, while there is no sepsis this level was increased so I will repeat another level later in the morning but otherwise can monitor with BMP  -BCx NGTD  - Last Ucx on 8/9/22 showed MSSA susceptible to PO Bactrim.   - Adjusted pain regimen, added PRN PO oxy with IV dilaudid.   -Continue on IV zosyn for UCx growing Corynebacterium striatum group and Staph aureus MSSA.    Post herpetic neuralgia- (present on admission)  Assessment & Plan  Distribution from T12-L1, he stated he had 3 weeks ago.  Picture above.  Now has ongoing neuralgic pain  Increase pregabalin to 100mg TID, has improved pain control    Essential hypertension- (present on admission)  Assessment & Plan  - Start as needed labetalol  -Adjust as needed    Type 2 diabetes mellitus with hyperglycemia (HCC)- (present on admission)  Assessment & Plan  - Start insulin sliding scale  -Adjust as needed    Chronic systolic heart failure (HCC)- (present on admission)  Assessment & Plan  - Patient at this time has no sign of fluid overload  -He does have an infection, will be strict n.p.o. and does have a worsening lactic acid level, because of this I am going to start IV fluids at a slow rate and give 1 L  -Closely monitor for overload    Paroxysmal A-fib (HCC)- (present on admission)  Assessment & Plan  - Currently in sinus rhythm  -Restart home Eliquis    Benign prostatic hyperplasia with urinary retention- (present on admission)  Assessment & Plan  - Continue Proscar and Flomax when able     VTE prophylaxis: therapeutic anticoagulation with Eliquis    I have performed a physical exam and reviewed and updated ROS and Plan today (8/22/2022). In review of yesterday's note (8/21/2022), there are no changes except as documented above.

## 2022-08-22 NOTE — CARE PLAN
The patient is Stable - Low risk of patient condition declining or worsening    Shift Goals  Clinical Goals: patient will remain free from falls during shift.  Patient Goals: home  Family Goals: n/a    Progress made toward(s) clinical / shift goals:  Patient calls appropriately before getting up out of bed.  Bed in low and locked position.  Call light within reach.     Patient is not progressing towards the following goals: NA    Problem: Pain - Standard  Goal: Alleviation of pain or a reduction in pain to the patient’s comfort goal  Outcome: Progressing     Problem: Knowledge Deficit - Standard  Goal: Patient and family/care givers will demonstrate understanding of plan of care, disease process/condition, diagnostic tests and medications  Outcome: Progressing     Problem: Fall Risk  Goal: Patient will remain free from falls  Outcome: Progressing     Problem: Skin Integrity  Goal: Skin integrity is maintained or improved  Outcome: Progressing

## 2022-08-22 NOTE — CARE PLAN
The patient is Stable - Low risk of patient condition declining or worsening    Shift Goals  Clinical Goals: Give scheduled antibiotics  Patient Goals: Rest  Family Goals: n/a    Progress made toward(s) clinical / shift goals:  Tolerated Zosyn.  Patient has been resting comfortably in bed.  Patient did refuse midnight blood sugar check; tried once with little blood and so tried to restick patient but refused.    Patient is not progressing towards the following goals:  N/A.

## 2022-08-23 LAB
BACTERIA BLD CULT: NORMAL
BACTERIA BLD CULT: NORMAL
EKG IMPRESSION: NORMAL
GLUCOSE BLD STRIP.AUTO-MCNC: 181 MG/DL (ref 65–99)
GLUCOSE BLD STRIP.AUTO-MCNC: 186 MG/DL (ref 65–99)
GLUCOSE BLD STRIP.AUTO-MCNC: 235 MG/DL (ref 65–99)
GLUCOSE BLD STRIP.AUTO-MCNC: 243 MG/DL (ref 65–99)
SIGNIFICANT IND 70042: NORMAL
SIGNIFICANT IND 70042: NORMAL
SITE SITE: NORMAL
SITE SITE: NORMAL
SOURCE SOURCE: NORMAL
SOURCE SOURCE: NORMAL
TROPONIN T SERPL-MCNC: 11 NG/L (ref 6–19)
TROPONIN T SERPL-MCNC: 11 NG/L (ref 6–19)

## 2022-08-23 PROCEDURE — 700102 HCHG RX REV CODE 250 W/ 637 OVERRIDE(OP): Performed by: INTERNAL MEDICINE

## 2022-08-23 PROCEDURE — A9270 NON-COVERED ITEM OR SERVICE: HCPCS | Performed by: INTERNAL MEDICINE

## 2022-08-23 PROCEDURE — 36415 COLL VENOUS BLD VENIPUNCTURE: CPT

## 2022-08-23 PROCEDURE — 82962 GLUCOSE BLOOD TEST: CPT

## 2022-08-23 PROCEDURE — 84484 ASSAY OF TROPONIN QUANT: CPT

## 2022-08-23 PROCEDURE — 99232 SBSQ HOSP IP/OBS MODERATE 35: CPT | Performed by: INTERNAL MEDICINE

## 2022-08-23 PROCEDURE — 700101 HCHG RX REV CODE 250: Performed by: INTERNAL MEDICINE

## 2022-08-23 PROCEDURE — 94760 N-INVAS EAR/PLS OXIMETRY 1: CPT

## 2022-08-23 PROCEDURE — 93010 ELECTROCARDIOGRAM REPORT: CPT | Performed by: INTERNAL MEDICINE

## 2022-08-23 PROCEDURE — 700102 HCHG RX REV CODE 250 W/ 637 OVERRIDE(OP): Performed by: HOSPITALIST

## 2022-08-23 PROCEDURE — 93005 ELECTROCARDIOGRAM TRACING: CPT | Performed by: INTERNAL MEDICINE

## 2022-08-23 PROCEDURE — A9270 NON-COVERED ITEM OR SERVICE: HCPCS | Performed by: HOSPITALIST

## 2022-08-23 PROCEDURE — 770006 HCHG ROOM/CARE - MED/SURG/GYN SEMI*

## 2022-08-23 RX ORDER — ENEMA 19; 7 G/133ML; G/133ML
1 ENEMA RECTAL ONCE
Status: COMPLETED | OUTPATIENT
Start: 2022-08-23 | End: 2022-08-23

## 2022-08-23 RX ADMIN — FINASTERIDE 5 MG: 5 TABLET, FILM COATED ORAL at 17:03

## 2022-08-23 RX ADMIN — APIXABAN 5 MG: 5 TABLET, FILM COATED ORAL at 17:03

## 2022-08-23 RX ADMIN — PREGABALIN 100 MG: 100 CAPSULE ORAL at 06:25

## 2022-08-23 RX ADMIN — POLYETHYLENE GLYCOL 3350, SODIUM SULFATE ANHYDROUS, SODIUM BICARBONATE, SODIUM CHLORIDE, POTASSIUM CHLORIDE 4 L: 236; 22.74; 6.74; 5.86; 2.97 POWDER, FOR SOLUTION ORAL at 16:31

## 2022-08-23 RX ADMIN — PREGABALIN 100 MG: 100 CAPSULE ORAL at 11:07

## 2022-08-23 RX ADMIN — SENNOSIDES AND DOCUSATE SODIUM 2 TABLET: 50; 8.6 TABLET ORAL at 06:25

## 2022-08-23 RX ADMIN — INSULIN HUMAN 2 UNITS: 100 INJECTION, SOLUTION PARENTERAL at 11:07

## 2022-08-23 RX ADMIN — INSULIN HUMAN 1 UNITS: 100 INJECTION, SOLUTION PARENTERAL at 06:29

## 2022-08-23 RX ADMIN — PREGABALIN 100 MG: 100 CAPSULE ORAL at 17:03

## 2022-08-23 RX ADMIN — SODIUM PHOSPHATE 133 ML: 7; 19 ENEMA RECTAL at 11:19

## 2022-08-23 RX ADMIN — AMOXICILLIN AND CLAVULANATE POTASSIUM 1 TABLET: 875; 125 TABLET, FILM COATED ORAL at 06:25

## 2022-08-23 RX ADMIN — HYDROMORPHONE HYDROCHLORIDE 2 MG: 2 TABLET ORAL at 22:19

## 2022-08-23 RX ADMIN — INSULIN HUMAN 1 UNITS: 100 INJECTION, SOLUTION PARENTERAL at 23:15

## 2022-08-23 RX ADMIN — TAMSULOSIN HYDROCHLORIDE 0.4 MG: 0.4 CAPSULE ORAL at 06:25

## 2022-08-23 RX ADMIN — INSULIN HUMAN 2 UNITS: 100 INJECTION, SOLUTION PARENTERAL at 17:55

## 2022-08-23 RX ADMIN — APIXABAN 5 MG: 5 TABLET, FILM COATED ORAL at 06:25

## 2022-08-23 RX ADMIN — AMOXICILLIN AND CLAVULANATE POTASSIUM 1 TABLET: 875; 125 TABLET, FILM COATED ORAL at 17:03

## 2022-08-23 RX ADMIN — SENNOSIDES AND DOCUSATE SODIUM 2 TABLET: 50; 8.6 TABLET ORAL at 20:55

## 2022-08-23 ASSESSMENT — ENCOUNTER SYMPTOMS
NAUSEA: 0
CONSTIPATION: 0
COUGH: 0
DIARRHEA: 0
BACK PAIN: 0
SHORTNESS OF BREATH: 0
DIZZINESS: 0
VOMITING: 0
PALPITATIONS: 0
WEAKNESS: 1
ABDOMINAL PAIN: 0
HEADACHES: 0
FEVER: 0
CHILLS: 0

## 2022-08-23 NOTE — PROGRESS NOTES
Report received from Callie for transfer of care . Assumed pt care at 2350  Pt is A&Ox4, resting comfortably in bed. Pt on r.a.. No signs of SOB/respiratory distress. Labs noted, VSS. Pt c/o no pain at this moment. Andres catheter attached to urobag .Fall precautions in place. Bed at lowest position. Call light and personal belongings within reach. Continue to monitor

## 2022-08-23 NOTE — PROGRESS NOTES
1240: Patient c/o dizziness, lightheaded, nausea, and chest discomfort.  C/O hand being cold and no circulation.  Hands are white in color, cap refill > 3 seconds. VSS 99/68 pulse 101 95% on RA. Notified MD.    1255:  Dr. Pepe martinez MD to place orders.

## 2022-08-23 NOTE — DISCHARGE PLANNING
CHW met with pt bedside to offer CCM services. Pt states he is not currently sure about his discharge plan. Pt states he was told he will be kept for another night. Pt mentions complications with regard to his current health. CCM info left bedside. CHW will monitor for post care assessment and needs.

## 2022-08-23 NOTE — PROGRESS NOTES
"Hospital Medicine Daily Progress Note    Date of Service  8/23/2022    Chief Complaint  Francesco Schulte is a 72 y.o. male admitted 8/18/2022 with   Chief Complaint   Patient presents with    Chest Pain     L and R sided CP. Reports also SOB and dizziness.    Flank Pain     Bilateral flank pain, reports recent dx of UTI     Headache    Sent by MD     Asked to come to ER by MD for positive blood cultures \"that are resistant to his antibiotics for UTI\" per daughter.      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 Coli, last UTI grew Candida albicans.  Patient admitted 8/19/22 for recurrent UTI, started on IV zosyn.    Interval Problem Update  8/23: Complains of severe constipation, had 2 small bowel movements yesterday.  Fleet enema.  Also with+CP, recurrent.  Associated with low normal BP.  EKG with LBBB, chest pain resolved.  Troponin pending      I have discussed this patient's plan of care and discharge plan at IDT rounds today with Case Management, Nursing, Nursing leadership, and other members of the IDT team.    Consultants/Specialty  none    Code Status  Full Code    Disposition  Patient is not medically cleared for discharge.   Anticipate discharge to home  I have placed the appropriate orders for post-discharge needs.    Review of Systems  Review of Systems   Constitutional:  Positive for malaise/fatigue. Negative for chills and fever.   Respiratory:  Negative for cough and shortness of breath.    Cardiovascular:  Negative for chest pain and palpitations.   Gastrointestinal:  Negative for abdominal pain, constipation, diarrhea, nausea and vomiting.   Musculoskeletal:  Negative for back pain and joint pain.   Skin:  Positive for rash.        Pain over rash area "   Neurological:  Positive for weakness. Negative for dizziness and headaches.   All other systems reviewed and are negative.     Physical Exam  Temp:  [36.3 °C (97.4 °F)-36.7 °C (98.1 °F)] 36.4 °C (97.6 °F)  Pulse:  [] 80  Resp:  [16-20] 16  BP: ()/(68-87) 122/81  SpO2:  [92 %-100 %] 95 %    Physical Exam  Vitals and nursing note reviewed.   Constitutional:       General: He is not in acute distress.     Comments: Frail, thin appearing elderly male   HENT:      Head: Normocephalic and atraumatic.      Comments: Temporal muscle wasting noted     Nose: Nose normal. No congestion.   Eyes:      General: No scleral icterus.     Extraocular Movements: Extraocular movements intact.   Cardiovascular:      Rate and Rhythm: Normal rate and regular rhythm.      Pulses: Normal pulses.      Heart sounds: Normal heart sounds. No murmur heard.  Pulmonary:      Effort: Pulmonary effort is normal. No respiratory distress.      Breath sounds: Normal breath sounds.   Abdominal:      General: Abdomen is flat. Bowel sounds are normal. There is no distension.      Palpations: Abdomen is soft.   Genitourinary:     Comments: Andres in place  Musculoskeletal:         General: No swelling.      Cervical back: Normal range of motion and neck supple.      Comments: Sarcopenic. B/L dorsal hands visible muscle atrophy.   Skin:     General: Skin is warm.      Capillary Refill: Capillary refill takes less than 2 seconds.      Coloration: Skin is not jaundiced.      Findings: Rash (crusted lesions over suprapubic area extending infraumbilical, right side of T12- L1 dermatome area.) present.   Neurological:      Mental Status: He is alert and oriented to person, place, and time. Mental status is at baseline.      Motor: Weakness present.   Psychiatric:         Mood and Affect: Mood normal.         Behavior: Behavior normal.         Thought Content: Thought content normal.         Judgment: Judgment normal.         Fluids    Intake/Output  Summary (Last 24 hours) at 8/23/2022 1436  Last data filed at 8/23/2022 1231  Gross per 24 hour   Intake 840 ml   Output 3800 ml   Net -2960 ml         Laboratory  Recent Labs     08/21/22  0430 08/22/22  0114   WBC 3.6* 3.2*   RBC 3.83* 3.81*   HEMOGLOBIN 15.0 14.9   HEMATOCRIT 44.2 44.0   .4* 115.5*   MCH 39.2* 39.1*   MCHC 33.9 33.9   RDW 67.2* 66.8*   PLATELETCT 130* 134*   MPV 10.7 11.5       Recent Labs     08/21/22  0430 08/22/22  0114   SODIUM 134* 139   POTASSIUM 3.6 4.1   CHLORIDE 101 104   CO2 20 24   GLUCOSE 87 129*   BUN 8 13   CREATININE 0.91 1.03   CALCIUM 8.9 9.1                     Imaging  CT-ABDOMEN-PELVIS WITH   Final Result         1.  Mild fluid-filled prominence of the small bowel, appearance suggests ileus and/or enteritis, radiographic follow-up to resolution recommended as clinically appropriate.   2.  Hepatomegaly   3.  Diverticulosis   4.  Enlarged prostate, workup and evaluation for causes of prostate enlargement recommended as clinically appropriate   5.  Thickened bladder wall, could represent changes of bladder outlet obstruction or cystitis, correlate with urinalysis.   6.  Atherosclerosis and atherosclerotic coronary artery disease      DX-CHEST-PORTABLE (1 VIEW)   Final Result         1.  No acute cardiopulmonary disease.   2.  Atherosclerosis             Assessment/Plan  * Ileus (HCC)- (present on admission)  Assessment & Plan  -Noted on CT scan  -Associated with pain out of proportion on exam  -bowel regimen ordered  -He has had 3 small bowel movements however still feels distended  Fleet enema, GoLytely    Post herpetic neuralgia- (present on admission)  Assessment & Plan  Distribution from T12-L1, he stated he had 3 weeks ago.  Picture above.  Now has ongoing neuralgic pain  Increase pregabalin to 100mg TID, has improved pain control    Urinary tract infection associated with indwelling urethral catheter (HCC)- (present on admission)  Assessment & Plan  - Patient failed  outpatient ciprofloxacin, presented to ED with prior Andres catheter which caused patient's UTI, this Andres was exchanged prior to being admitted.  -ERP discussed the case with pharmacy who recommended Zosyn   -BCx NGTD  - Last Ucx on 8/9/22 showed MSSA susceptible to PO Bactrim.   - Adjusted pain regimen, added PRN PO oxy with IV dilaudid.   -Continue on IV zosyn for UCx growing Corynebacterium striatum group and Staph aureus MSSA.  Transitioned to Augmentin, will complete 10 days total    Chest pain  Assessment & Plan  Chest tightness associated with vasospasm bilateral hands  Stat troponin, pending  EKG -I reviewed personally and it shows LBBB and early repolarization abnormality, repeat every 6  Blood pressure was down to the low 100s and has improved to the 120s  Monitor clinically    Essential hypertension- (present on admission)  Assessment & Plan  - Start as needed labetalol  -Adjust as needed    Type 2 diabetes mellitus with hyperglycemia (HCC)- (present on admission)  Assessment & Plan  - Start insulin sliding scale  -Adjust as needed    Chronic systolic heart failure (HCC)- (present on admission)  Assessment & Plan  - Patient at this time has no sign of fluid overload  -Continue Eliquis, no other blood pressure medications    Paroxysmal A-fib (HCC)- (present on admission)  Assessment & Plan  - Currently in sinus rhythm  -Restart home Eliquis    Benign prostatic hyperplasia with urinary retention- (present on admission)  Assessment & Plan  - Continue Proscar and Flomax when able       VTE prophylaxis: therapeutic anticoagulation with Eliquis    I have performed a physical exam and reviewed and updated ROS and Plan today (8/23/2022). In review of yesterday's note (8/22/2022), there are no changes except as documented above.

## 2022-08-23 NOTE — PROGRESS NOTES
1200: Administered enema. Patient had BM, but still stating no abd pain relief from BM.  Notified MD. BEST to place orders.     1600: Began Go Litely per MD order.  Patient stating concern still no relief and per patient abd more distended and increase in pain.  Educated patient on the bowel prep order and only drink as he can tolerate.  Patient states understanding.

## 2022-08-23 NOTE — CARE PLAN
The patient is Stable - Low risk of patient condition declining or worsening    Shift Goals  Clinical Goals: sleep at least 10 hours, free from falls  Patient Goals: BM during this shift  Family Goals: n/a    Progress made toward(s) clinical / shift goals:  Pt still awake at this this time. Fall precautions in placed. Hourly rounding in progress    Patient is not progressing towards the following goals: N/A

## 2022-08-23 NOTE — ASSESSMENT & PLAN NOTE
Chest tightness associated with vasospasm bilateral hands  Stat troponin, pending  EKG -I reviewed personally and it shows LBBB and early repolarization abnormality, repeat every 6  Blood pressure was down to the low 100s and has improved to the 120s  Monitor clinically

## 2022-08-23 NOTE — CARE PLAN
The patient is Stable - Low risk of patient condition declining or worsening    Shift Goals  Clinical Goals: patient will remain free from falls during shift  Patient Goals: rest, BM  Family Goals: n/a    Progress made toward(s) clinical / shift goals:  Patient calls approprietly before getting up out of bed. Bed in low and locked position.  Call light within reach.  Patient had small BM but no relief.      Patient is not progressing towards the following goals:     Problem: Pain - Standard  Goal: Alleviation of pain or a reduction in pain to the patient’s comfort goal  Outcome: Progressing     Problem: Fall Risk  Goal: Patient will remain free from falls  Outcome: Progressing     Problem: Skin Integrity  Goal: Skin integrity is maintained or improved  Outcome: Progressing

## 2022-08-24 ENCOUNTER — APPOINTMENT (OUTPATIENT)
Dept: RADIOLOGY | Facility: MEDICAL CENTER | Age: 72
DRG: 699 | End: 2022-08-24
Attending: INTERNAL MEDICINE
Payer: MEDICARE

## 2022-08-24 VITALS
TEMPERATURE: 97.8 F | HEIGHT: 75 IN | RESPIRATION RATE: 16 BRPM | OXYGEN SATURATION: 98 % | WEIGHT: 168.43 LBS | DIASTOLIC BLOOD PRESSURE: 84 MMHG | SYSTOLIC BLOOD PRESSURE: 142 MMHG | BODY MASS INDEX: 20.94 KG/M2 | HEART RATE: 68 BPM

## 2022-08-24 LAB
EKG IMPRESSION: NORMAL
GLUCOSE BLD STRIP.AUTO-MCNC: 156 MG/DL (ref 65–99)
GLUCOSE BLD STRIP.AUTO-MCNC: 258 MG/DL (ref 65–99)
TROPONIN T SERPL-MCNC: 14 NG/L (ref 6–19)

## 2022-08-24 PROCEDURE — 36415 COLL VENOUS BLD VENIPUNCTURE: CPT

## 2022-08-24 PROCEDURE — 82962 GLUCOSE BLOOD TEST: CPT | Mod: 91

## 2022-08-24 PROCEDURE — 700102 HCHG RX REV CODE 250 W/ 637 OVERRIDE(OP): Performed by: INTERNAL MEDICINE

## 2022-08-24 PROCEDURE — A9270 NON-COVERED ITEM OR SERVICE: HCPCS | Performed by: HOSPITALIST

## 2022-08-24 PROCEDURE — 700111 HCHG RX REV CODE 636 W/ 250 OVERRIDE (IP): Performed by: INTERNAL MEDICINE

## 2022-08-24 PROCEDURE — 99239 HOSP IP/OBS DSCHRG MGMT >30: CPT | Performed by: INTERNAL MEDICINE

## 2022-08-24 PROCEDURE — 700102 HCHG RX REV CODE 250 W/ 637 OVERRIDE(OP): Performed by: HOSPITALIST

## 2022-08-24 PROCEDURE — A9270 NON-COVERED ITEM OR SERVICE: HCPCS | Performed by: INTERNAL MEDICINE

## 2022-08-24 PROCEDURE — 93005 ELECTROCARDIOGRAM TRACING: CPT | Performed by: INTERNAL MEDICINE

## 2022-08-24 PROCEDURE — 93010 ELECTROCARDIOGRAM REPORT: CPT | Performed by: INTERNAL MEDICINE

## 2022-08-24 PROCEDURE — 84484 ASSAY OF TROPONIN QUANT: CPT

## 2022-08-24 PROCEDURE — 94760 N-INVAS EAR/PLS OXIMETRY 1: CPT

## 2022-08-24 PROCEDURE — 74018 RADEX ABDOMEN 1 VIEW: CPT

## 2022-08-24 RX ADMIN — PREGABALIN 100 MG: 100 CAPSULE ORAL at 11:45

## 2022-08-24 RX ADMIN — FINASTERIDE 5 MG: 5 TABLET, FILM COATED ORAL at 14:15

## 2022-08-24 RX ADMIN — INSULIN HUMAN 3 UNITS: 100 INJECTION, SOLUTION PARENTERAL at 11:44

## 2022-08-24 RX ADMIN — PREGABALIN 100 MG: 100 CAPSULE ORAL at 06:03

## 2022-08-24 RX ADMIN — HYDROMORPHONE HYDROCHLORIDE 2 MG: 2 TABLET ORAL at 05:37

## 2022-08-24 RX ADMIN — SENNOSIDES AND DOCUSATE SODIUM 2 TABLET: 50; 8.6 TABLET ORAL at 06:03

## 2022-08-24 RX ADMIN — ONDANSETRON 4 MG: 2 INJECTION INTRAMUSCULAR; INTRAVENOUS at 05:54

## 2022-08-24 RX ADMIN — HYDROMORPHONE HYDROCHLORIDE 2 MG: 2 TABLET ORAL at 10:50

## 2022-08-24 RX ADMIN — AMOXICILLIN AND CLAVULANATE POTASSIUM 1 TABLET: 875; 125 TABLET, FILM COATED ORAL at 06:03

## 2022-08-24 RX ADMIN — INSULIN HUMAN 1 UNITS: 100 INJECTION, SOLUTION PARENTERAL at 05:53

## 2022-08-24 RX ADMIN — TAMSULOSIN HYDROCHLORIDE 0.4 MG: 0.4 CAPSULE ORAL at 06:03

## 2022-08-24 RX ADMIN — APIXABAN 5 MG: 5 TABLET, FILM COATED ORAL at 06:03

## 2022-08-24 ASSESSMENT — PAIN DESCRIPTION - PAIN TYPE: TYPE: ACUTE PAIN

## 2022-08-24 NOTE — PROGRESS NOTES
Around 0530, this Rn was notified by CNA pt c/o pain . Prn dilaudid given per Mar. Rn was asked by pt if going to discharge this am. Rn notified pt is possible since pt has a bowel movement.    Pt  c/o of having stroke and lacking circulation. Vitals WNL.pt  c/o chest pain and request for nitro. Dr David notified. Rapid called, stat EKG and troponin result negative

## 2022-08-24 NOTE — CARE PLAN
The patient is Stable - Low risk of patient condition declining or worsening    Shift Goals  Clinical Goals: bowel management  Patient Goals: pt will have a bm with taking Go-lytely  Family Goals: n/a    Progress made toward(s) clinical / shift goals:  pt took 480 ml of Golytely this shift and refused the rest. Pt c/o of been full in the stomach and unable to take the rest. Rn provided education. large bm this shift     Patient is not progressing towards the following goals:      Problem: Knowledge Deficit - Standard  Goal: Patient and family/care givers will demonstrate understanding of plan of care, disease process/condition, diagnostic tests and medications  Outcome: Progressing

## 2022-08-24 NOTE — PROGRESS NOTES
Rapid response called due to chest pain  I did speak with the bedside nurse, patient had not had a bowel movement for days, finally did have a bowel movement.  Shortly thereafter, began having multiple complaints including chest pain.  He recently had chest pain and did have troponins trended as well as EKGs with no concerning changes.  I will obtain a stat troponin and EKG however I do believe his symptoms are because he had a vagal response post the bowel movement.  As long as troponin and EKG are normal, no additional work-up.

## 2022-08-24 NOTE — PROGRESS NOTES
"Rapid Response Summary     Rapid response called at 0542 for: Chest Pain     VS: WDL (See Vitals Flowsheet)  Additional info: Clutching left side of chest saying, \"I think I'm having a stroke.\" No neuro deficits noted. Pt was just told he might be going home today by RN. Pt also acted unconscious and began shaking, but immediately responded to sternal rub. Pt acted strangely throughout interaction and expressed several other random symptoms (bloody urine, nausea, needing to poop, holding his breath, pain traveling all over his body).   MD Paged: Dr. David  Interventions:    Imaging/Tests: EKG    Labs: Troponin   Medications:  Pt was just given dilaudid. Also given zofran.    Other: 2L O2  Disposition: Improved with rapid response team interventions. Primary RN updated on plan of care. Transfer not indicated at this time.    "

## 2022-08-24 NOTE — DISCHARGE SUMMARY
"Discharge Summary    CHIEF COMPLAINT ON ADMISSION  Chief Complaint   Patient presents with    Chest Pain     L and R sided CP. Reports also SOB and dizziness.    Flank Pain     Bilateral flank pain, reports recent dx of UTI     Headache    Sent by MD     Asked to come to ER by MD for positive blood cultures \"that are resistant to his antibiotics for UTI\" per daughter.        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 Coli, last UTI grew Candida albicans.  Patient admitted 8/19/22 for recurrent UTI, started on IV zosyn.  He presented to the ER with go catheter in place.  He agreed to have the go bag changed but refused to have the go changed.  It was in place for urine retention.  His urine cultures grew Corynebacterium and staff aureus.  He was transitioned to Augmentin which will complete a total of 10 days.  Throughout his hospital stay, he complained of intermittent chest discomfort.  EKG showed left bundle branch block which had been present on previous EKGs.  His troponin remained negative and chest pain resolved spontaneously.  He complained of constipation and therefore was provided an aggressive bowel regimen.  He had 3 small bowel movements followed by a large bowel movement and felt his constipation had resolved.  He was noted to have a herpes zoster rash over his right lower quadrant/suprapubic region however this continued to show improvement throughout his hospital stay.  He did have discomfort in this area which was addressed with gabapentin.    He was instructed to follow-up with outpatient urology to discuss Go catheter management.    Therefore, he is " discharged in fair and stable condition to home with close outpatient follow-up.    The patient met 2-midnight criteria for an inpatient stay at the time of discharge.    Discharge Date  8/24/2022    FOLLOW UP ITEMS POST DISCHARGE  Follow-up with outpatient urology to discuss Go catheter management    DISCHARGE DIAGNOSES  Principal Problem:    Ileus (HCC) POA: Yes  Active Problems:    Benign prostatic hyperplasia with urinary retention POA: Yes      Overview: The patient was seen in the emergency department on 7/13 for blood in his       urine. The patient was also having difficulty voiding at that time. A       go catheter was placed and since that time his retention and urine       changes have improved. The urine is not completely back to his baseline,       but it is continually improving. He had an ultrasound done yesterday of       his kidney and bladder, which showed bilateral renal cysts and enlarged       prostate.    Paroxysmal A-fib (HCC) (Chronic) POA: Yes    Chronic systolic heart failure (HCC) (Chronic) POA: Yes      Overview: Patient is followed by cardiology Dr. Morales. ejection fraction 15 to 20%       on echocardiogram from January 2022.        Patient is not on ACE inhibitor's, ARBs, or beta-blocker due to severe       orthostatic symptoms.  He was taking digoxin 125 mcg daily it was       discontinued.      He was unable to tolerate eplerenone.  He is currently anticoagulated with       Eliquis 5 mg tablet daily.      After discharge from the hospital, patient followed up with his cardiology       team at [facility].  He had stress test done: Stress echocardiography report       from June 9, 2022.       Severely reduced systolic function 20%.  Seen and akinetic septum and       apex.  Akinetic basal lateral wall.  Moderate-severe posterior wall       hypokinesis.  No no formed thrombus. RVSP 22 Left ventricle is mildly       dilated.             Right heart cath - unable to acess resulsts              His team Dinh, advised that he needs to have his complete blood work       done in 2 to 3 weeks and then he will have a follow-up appointment with       them and they will consider trying a different medication for his       congestive heart failure.          Type 2 diabetes mellitus with hyperglycemia (HCC) POA: Yes      Overview: This is a chronic condition uncontrolled, managed by endocrinology Dr. Mckeon, patient has not seen him for over a year now. His current       regimen includes Tresiba 12 Units daily, also Humalog sliding scale with       meals.  and Farxiga but was on hold due to urinary symptoms.            Lab Results       Component Value Date/Time        HBA1C 8.4 (H) 07/06/2022 10:25 AM              Fasting sugars: 130s, 215-220 postprandial      Last diabetic foot exam: 07/26/2022      Last retinal eye exam: due, referred      ACEi/ARB: contraindicated due to orthostatic hypotension, on Farxyga       Statin: intolerant       Aspirin: no, on Eliquis      Concomitant HTN: no                Essential hypertension POA: Yes    Chest pain POA: Unknown      Overview: IMO load March 2020    Urinary tract infection associated with indwelling urethral catheter (HCC) POA: Yes      Overview: Patient was seen on Monday for symptoms of UTI.  His POCT UA in clinic       positive for nitrates, leukocyte esterase, blood.       Urine culture grew normal jeanine.      Patient reports extreme pain in his penis while urinating.       He tells me, that he is taking tramadol, oxycodone and other pain       medications, that he has at home from the time, when he was receiving care       at [facility].      He is taking ciprofloxacin as prescribed and reports that his symptoms did       not improve.                Post herpetic neuralgia POA: Yes  Resolved Problems:    * No resolved hospital problems. *      FOLLOW UP  Future Appointments   Date Time Provider Department Center   9/7/2022  8:20 AM Tala CHAVEZ  JANES Dunne BRINDA Dunne M.D.  [address]  [address]  [place] [address] [phone]    Call in 1 week(s)  Please follow up for post hospital visit within 1-2 weeks. MR. Schulte will need Shingrix vaccine completion to help wtih post herpetic neuralgia. He is to continue Pregabalin, attempt to wean once neuralgia resolves.  Please follow up UTI and go catheter.      MEDICATIONS ON DISCHARGE     Medication List        START taking these medications        Instructions   amoxicillin-clavulanate 875-125 MG Tabs  Commonly known as: AUGMENTIN   Take 1 Tablet by mouth every 12 hours for 3 days.  Dose: 1 Tablet     pregabalin 100 MG Caps  Commonly known as: LYRICA   Take 1 Capsule by mouth 3 times a day for 30 days.  Dose: 100 mg            CONTINUE taking these medications        Instructions   apixaban 5mg Tabs  Commonly known as: ELIQUIS   Take 5 mg by mouth 2 times a day.  Dose: 5 mg     dapagliflozin propanediol 10 MG Tabs  Commonly known as: Farxiga   Take 5 mg by mouth every day.  Dose: 5 mg     dicyclomine 10 MG Caps  Commonly known as: BENTYL   Take 10 mg by mouth every 6 hours as needed (Abdominal cramps).  Dose: 10 mg     finasteride 5 MG Tabs  Commonly known as: PROSCAR   Take 1 Tablet by mouth every day.  Dose: 5 mg     hydroxyurea 500 MG Caps  Commonly known as: HYDREA   Take 500 mg by mouth 2 Times a Day.  Dose: 500 mg     nitroglycerin 0.4 MG Subl  Commonly known as: NITROSTAT   Place 1 Tablet under the tongue as needed for Chest Pain (or hypertension >180/110).  Dose: 0.4 mg     tamsulosin 0.4 MG capsule  Commonly known as: FLOMAX   Take 1 Capsule by mouth every day.  Dose: 0.4 mg     Tresiba FlexTouch 200 UNIT/ML Sopn  Generic drug: Insulin Degludec   Inject 12 Units under the skin every day for 90 days.  Dose: 12 Units            STOP taking these medications      acyclovir 800 MG Tabs  Commonly known as: ZOVIRAX     ciprofloxacin 500 MG Tabs  Commonly  "known as: CIPRO              Allergies  Allergies   Allergen Reactions    Doxycycline      Swelling lips and difficulties swallowing    Atorvastatin      Pt and pts wife are not sure what happens     Beta Adrenergic Blockers Unspecified     dizziness    Eplerenone Unspecified     Intolerance, dehydration, altered mental status    Metformin Unspecified and Palpitations     Cardiac effects  \"Similar to a heart attack, I was hospitalized\"    Simvastatin      Other reaction(s): Other (Specify with Comments)  Myalgias  Myalgias    Spironolactone Palpitations    Statins [Hmg-Coa-R Inhibitors]      Muscle ache, joint pain       DIET  Orders Placed This Encounter   Procedures    Diet Order Diet: Cardiac     Standing Status:   Standing     Number of Occurrences:   1     Order Specific Question:   Diet:     Answer:   Cardiac [6]       ACTIVITY  As tolerated.  Weight bearing as tolerated    CONSULTATIONS  ID    PROCEDURES  None    LABORATORY  Lab Results   Component Value Date    SODIUM 139 08/22/2022    POTASSIUM 4.1 08/22/2022    CHLORIDE 104 08/22/2022    CO2 24 08/22/2022    GLUCOSE 129 (H) 08/22/2022    BUN 13 08/22/2022    CREATININE 1.03 08/22/2022        Lab Results   Component Value Date    WBC 3.2 (L) 08/22/2022    HEMOGLOBIN 14.9 08/22/2022    HEMATOCRIT 44.0 08/22/2022    PLATELETCT 134 (L) 08/22/2022        Total time of the discharge process exceeds 43 minutes.  "

## 2022-08-24 NOTE — DISCHARGE PLANNING
Case Management Discharge Planning    Admission Date: 8/18/2022  GMLOS: 3.3  ALOS: 5    6-Clicks ADL Score: 24  6-Clicks Mobility Score: 20      Anticipated Discharge Dispo: Discharge Disposition: Discharged to home/self care (01)    DME Needed: No    Action(s) Taken: Spoke to pt at bedside regarding IMM and intent to discharge within next 48 hours. Provided copy of IMM.     Escalations Completed: None    Medically Clear: No    Next Steps:  f/u with medical team to discuss DC needs and barriers    Barriers to Discharge: Medical clearance    Is the patient up for discharge tomorrow: No, likely today

## 2022-08-25 ENCOUNTER — PATIENT OUTREACH (OUTPATIENT)
Dept: HEALTH INFORMATION MANAGEMENT | Facility: OTHER | Age: 72
End: 2022-08-25
Payer: MEDICARE

## 2022-08-25 ENCOUNTER — PATIENT OUTREACH (OUTPATIENT)
Dept: MEDICAL GROUP | Facility: MEDICAL CENTER | Age: 72
End: 2022-08-25
Payer: MEDICARE

## 2022-08-25 RX ORDER — PREGABALIN 100 MG/1
100 CAPSULE ORAL 3 TIMES DAILY
Qty: 90 CAPSULE | Refills: 0 | Status: SHIPPED | OUTPATIENT
Start: 2022-08-25 | End: 2022-08-26 | Stop reason: SDUPTHER

## 2022-08-25 SDOH — ECONOMIC STABILITY: TRANSPORTATION INSECURITY
IN THE PAST 12 MONTHS, HAS LACK OF TRANSPORTATION KEPT YOU FROM MEETINGS, WORK, OR FROM GETTING THINGS NEEDED FOR DAILY LIVING?: NO

## 2022-08-25 SDOH — ECONOMIC STABILITY: FOOD INSECURITY: WITHIN THE PAST 12 MONTHS, THE FOOD YOU BOUGHT JUST DIDN'T LAST AND YOU DIDN'T HAVE MONEY TO GET MORE.: NEVER TRUE

## 2022-08-25 SDOH — ECONOMIC STABILITY: FOOD INSECURITY: WITHIN THE PAST 12 MONTHS, YOU WORRIED THAT YOUR FOOD WOULD RUN OUT BEFORE YOU GOT MONEY TO BUY MORE.: NEVER TRUE

## 2022-08-25 SDOH — ECONOMIC STABILITY: INCOME INSECURITY: HOW HARD IS IT FOR YOU TO PAY FOR THE VERY BASICS LIKE FOOD, HOUSING, MEDICAL CARE, AND HEATING?: NOT HARD AT ALL

## 2022-08-25 SDOH — ECONOMIC STABILITY: TRANSPORTATION INSECURITY
IN THE PAST 12 MONTHS, HAS THE LACK OF TRANSPORTATION KEPT YOU FROM MEDICAL APPOINTMENTS OR FROM GETTING MEDICATIONS?: NO

## 2022-08-25 NOTE — PROGRESS NOTES
"Called and spoke to patient for TCM call. Patient states he is \"doing better\" but is still very weak. Patient reports he \"gets dizzy\" if he has to stand for too long. Patient reports he is now using his walker which \"he has never done\". Patient states the heat outside once he was discharged from the hospital \"Knocked me on my ass\". Patient reports after being in the air conditioned hospital, then an air-conditioned car when he was picked up, once he got out of the car in the heat to walk the 40-60 feet to his house, \"it was too much for me and I passed out\". Patient reports he went to the ground but with no injury. Is feeling better. Has no questions on his discharge instructions but feels like the discharge instructions should warn people about the risks associated with the heat outside. Patient has an appointment with his PCP on 9/7/2022 and does not want to schedule sooner because he is going to stay with his daughter in [place] for the next two weeks. He states she is a nurse and will be able to take care of him. Patient has not other questions or needs at this time. Patient declines interest in the Personal Care management program at this time. This RN will attempt to see patient in clinic after his PCP visit on 9/7/2022.   "

## 2022-08-25 NOTE — ED NOTES
Patient placed on amantadine which was put on hold by patient recently due to complaints of hallucinations  She is supposed to receive additional amantadine tablets by mail however comes to the emergency room with fall   · Patient is maintained on Stalevo, Azilect, ropinirole, and Gocovri as an outpatient  · Discontinue ropinirole per Neurology recommendations  · Continue Stalevo 12 5/50/201 tablet 6 times daily, Azilect 1 mg daily, Gocovri 137 mg daily- however later Neurology reviwed and recommeded to stop Gocvri as well sec to halluciations  Neurology discussed changed in regimen with daughter Brayan Chiang who agreed, I followed up and she is aware of the medications changes and agrees to see how her mother will do with Stalevo and Azilect for now, out pt follow up  · Palliative was also consulted and had a family meeting today    · PT/OT recommending rehab- patient accepted at [address] [place], [place] [address]ab spo2 dropping to 89 % on r/a, placed on supplemental oxygen at 2 l nc to facilitate oxygenation,  Now 98% on 2l nc. Wife at bedside.

## 2022-08-25 NOTE — PROGRESS NOTES
NATHAN Padilla contacted pt via post discharge call. CHW initially met with pt bedside while at [facility] to offer [facility] services. But pt was unclear of discharge plan. Completed SDOH screening and outpatient assessment. Pt was contacted earlier today by [facility] nurse for TCM call. Pt has scheduled follow up visit with PCP on 9/7 and will also meet with CCM nurse during clinic visit. Pt mentions good support from family and friends. No medical equipment used at home or needed. Pt is confident in ability to manage care post d/c. No issues keeping appointments or financial barriers to care. Completed AVS review/ medication/ questions. Bob mentions he is awaiting call back from [facility] for medications he will  later today. Pt denies need for resources such as food, transportation or housing. [facility] contact info will be sent via text to pt. Encouraged pt to contact if needed.     Community Health Worker Intake  Social determinates of health intake completed.   Identified barriers to none.  Contact information provided to Francesco Schulte. Yes   Has PCP appointment scheduled for 9/7/22  Scheduled Food Delivery/Home Visit/Outpatient Visit: No  Accepted/Declined Meds-To-Beds. N/a  Inpatient/Outpatient assessment completed.Outpatient   Did the patient receive medications post discharge: Yes    Plan: Discharge pt from [facility] services as all needs met. Pt to meet with [facility] nurse  Gamaliel MCKEON @ clinic during hospital follow up visit

## 2022-08-26 RX ORDER — AMOXICILLIN AND CLAVULANATE POTASSIUM 875; 125 MG/1; MG/1
1 TABLET, FILM COATED ORAL EVERY 12 HOURS
Qty: 6 TABLET | Refills: 0 | Status: SHIPPED | OUTPATIENT
Start: 2022-08-26 | End: 2022-08-29 | Stop reason: SDUPTHER

## 2022-08-26 RX ORDER — PREGABALIN 100 MG/1
100 CAPSULE ORAL 3 TIMES DAILY
Qty: 90 CAPSULE | Refills: 0 | Status: SHIPPED | OUTPATIENT
Start: 2022-08-26 | End: 2022-08-29 | Stop reason: SDUPTHER

## 2022-08-29 DIAGNOSIS — N39.0 URINARY TRACT INFECTION ASSOCIATED WITH INDWELLING URETHRAL CATHETER, SEQUELA: ICD-10-CM

## 2022-08-29 DIAGNOSIS — B02.29 POSTHERPETIC NEURALGIA: ICD-10-CM

## 2022-08-29 DIAGNOSIS — T83.511S URINARY TRACT INFECTION ASSOCIATED WITH INDWELLING URETHRAL CATHETER, SEQUELA: ICD-10-CM

## 2022-08-30 RX ORDER — PREGABALIN 100 MG/1
100 CAPSULE ORAL 3 TIMES DAILY
Qty: 90 CAPSULE | Refills: 0 | Status: SHIPPED | OUTPATIENT
Start: 2022-08-30 | End: 2022-09-14

## 2022-08-30 RX ORDER — AMOXICILLIN AND CLAVULANATE POTASSIUM 875; 125 MG/1; MG/1
1 TABLET, FILM COATED ORAL EVERY 12 HOURS
Qty: 6 TABLET | Refills: 0 | Status: SHIPPED | OUTPATIENT
Start: 2022-08-30 | End: 2022-09-02

## 2022-08-30 NOTE — TELEPHONE ENCOUNTER
Pt contacted office stating Dipika Molina.O. medication  [facility] can and has medication on hold  Would like medication resent      Requested Prescriptions     Pending Prescriptions Disp Refills    amoxicillin-clavulanate (AUGMENTIN) 875-125 MG Tab 6 Tablet 0     Sig: Take 1 Tablet by mouth every 12 hours for 3 days.    pregabalin (LYRICA) 100 MG Cap 90 Capsule 0     Sig: Take 1 Capsule by mouth 3 times a day for 30 days.

## 2022-09-14 ENCOUNTER — OFFICE VISIT (OUTPATIENT)
Dept: MEDICAL GROUP | Facility: MEDICAL CENTER | Age: 72
End: 2022-09-14
Payer: MEDICARE

## 2022-09-14 VITALS
DIASTOLIC BLOOD PRESSURE: 78 MMHG | WEIGHT: 177.14 LBS | BODY MASS INDEX: 22.14 KG/M2 | HEART RATE: 103 BPM | SYSTOLIC BLOOD PRESSURE: 110 MMHG | OXYGEN SATURATION: 97 % | TEMPERATURE: 97.9 F

## 2022-09-14 DIAGNOSIS — Z79.4 TYPE 2 DIABETES MELLITUS WITH HYPERGLYCEMIA, WITH LONG-TERM CURRENT USE OF INSULIN (HCC): ICD-10-CM

## 2022-09-14 DIAGNOSIS — N40.1 BENIGN PROSTATIC HYPERPLASIA WITH URINARY RETENTION: ICD-10-CM

## 2022-09-14 DIAGNOSIS — E11.9 TYPE 2 DIABETES MELLITUS WITHOUT COMPLICATION, WITH LONG-TERM CURRENT USE OF INSULIN (HCC): ICD-10-CM

## 2022-09-14 DIAGNOSIS — E11.65 TYPE 2 DIABETES MELLITUS WITH HYPERGLYCEMIA, WITH LONG-TERM CURRENT USE OF INSULIN (HCC): ICD-10-CM

## 2022-09-14 DIAGNOSIS — R33.8 BENIGN PROSTATIC HYPERPLASIA WITH URINARY RETENTION: ICD-10-CM

## 2022-09-14 DIAGNOSIS — I50.22 CHRONIC SYSTOLIC HEART FAILURE (HCC): ICD-10-CM

## 2022-09-14 DIAGNOSIS — Z23 NEED FOR VACCINATION: ICD-10-CM

## 2022-09-14 DIAGNOSIS — Z79.4 TYPE 2 DIABETES MELLITUS WITHOUT COMPLICATION, WITH LONG-TERM CURRENT USE OF INSULIN (HCC): ICD-10-CM

## 2022-09-14 PROBLEM — Z77.098 AGENT ORANGE EXPOSURE: Status: RESOLVED | Noted: 2017-09-13 | Resolved: 2022-09-14

## 2022-09-14 PROCEDURE — 99214 OFFICE O/P EST MOD 30 MIN: CPT | Performed by: INTERNAL MEDICINE

## 2022-09-14 RX ORDER — DAPAGLIFLOZIN 10 MG/1
5 TABLET, FILM COATED ORAL DAILY
Qty: 45 TABLET | Refills: 1 | Status: SHIPPED | OUTPATIENT
Start: 2022-09-14 | End: 2024-01-01

## 2022-09-14 RX ORDER — AMOXICILLIN AND CLAVULANATE POTASSIUM 875; 125 MG/1; MG/1
TABLET, FILM COATED ORAL
COMMUNITY
End: 2022-09-14

## 2022-09-14 RX ORDER — EPLERENONE 25 MG/1
TABLET, FILM COATED ORAL
Status: ON HOLD | COMMUNITY
Start: 2022-08-15 | End: 2023-01-01

## 2022-09-14 ASSESSMENT — ENCOUNTER SYMPTOMS
COUGH: 0
FEVER: 0
SHORTNESS OF BREATH: 0
FOCAL WEAKNESS: 0
SORE THROAT: 0
VOMITING: 0
SPEECH CHANGE: 0
DEPRESSION: 0
NAUSEA: 0

## 2022-09-14 ASSESSMENT — FIBROSIS 4 INDEX: FIB4 SCORE: 2.28

## 2022-09-14 NOTE — PROGRESS NOTES
Subjective:     Chief Complaint   Patient presents with    Follow-Up     Diagnoses of Need for vaccination, Type 2 diabetes mellitus without complication, with long-term current use of insulin (HCC), Chronic systolic heart failure (HCC), Benign prostatic hyperplasia with urinary retention, and Type 2 diabetes mellitus with hyperglycemia, with long-term current use of insulin (HCC) were pertinent to this visit.    HPI: Francesco is a pleasant 72 y.o. male who presents today for follow up  He has spent 1 week at the hospital due to complicated UTI, which was followed by 2 weeks of rehab in [facility].  He is feeling well, his Andres was finally discontinued and he is able to urinate on his own.    Problem   Chronic Systolic Heart Failure (Hcc)    Most recent ejection fraction 25% on echocardiogram.  He follows with cardiology at [facility], it appears that they are planning on placing new AICD.  Patient was also telling, that they would like to try one of the medications again.  He does not know all the details, he will update me later.       Type 2 Diabetes Mellitus With Hyperglycemia (Hcc)    This is a chronic condition uncontrolled, managed by endocrinology Dr. Mckeon, patient has not seen him for over a year now. His current regimen includes Tresiba 12 Units daily, also Humalog sliding scale with meals.  and Farxiga but was on hold due to urinary symptoms.    Lab Results   Component Value Date/Time    HBA1C 8.4 (H) 07/06/2022 10:25 AM      Fasting sugars: 130s, 215-220 postprandial  Last diabetic foot exam: 07/26/2022  Last retinal eye exam: Pending appointment with ophthalmology  ACEi/ARB: contraindicated due to orthostatic hypotension, on Farxyga   Statin: intolerant   Aspirin: no, on Eliquis  Concomitant HTN: no         Benign Prostatic Hyperplasia With Urinary Retention    Prior notes for details, patient has had his Andres discontinued today at [place] [facility] office.  He tells me he is able to urinate.  He will  continue taking his finasteride and tamsulosin as prescribed.     Agent Orange Exposure (Resolved)       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    Agent orange exposure     Anesthesia     resistant to pain meds    Cancer (HCC)     polycythemia vera    Diabetes (HCC)     insulin and oral meds    Family history of punctured lung 2002    left lung    Heart attack (HCC) 03/2017    Hemorrhagic disorder (HCC)     on blood thinners    High cholesterol     Ischemic cardiomyopathy     Kidney stones     hx of kidney stones    Lupus (HCC) 11/15/2019    Orthostatic hypotension 3/29/2018    Pain     headache pain    Polycythemia vera(238.4)     Stroke ([facility]) 2016    r/t afib; eye issues resolved afterward    Urinary bladder disorder     enlarged prostate, weak stream, difficulty urinating    Vertigo        Current Outpatient Medications Ordered in Epic   Medication Sig Dispense Refill    Zoster Vac Recomb Adjuvanted (SHINGRIX) 50 MCG/0.5ML Recon Susp Inject 0.5 mL into the shoulder, thigh, or buttocks one time for 1 dose. 1 Each 0    dapagliflozin propanediol (FARXIGA) 10 MG Tab Take 0.5 Tablets by mouth every day. 45 Tablet 1    eplerenone (INSPRA) 25 MG Tab       Insulin Degludec (TRESIBA FLEXTOUCH) 200 UNIT/ML Solution Pen-injector Inject 12 Units under the skin every day for 90 days. 9 mL 1    apixaban (ELIQUIS) 5mg Tab Take 5 mg by mouth 2 times a day.      dicyclomine (BENTYL) 10 MG Cap Take 10 mg by mouth every 6 hours as needed (Abdominal cramps).      tamsulosin (FLOMAX) 0.4 MG capsule Take 1 Capsule by mouth every day. 90 Capsule 1    finasteride (PROSCAR) 5 MG Tab Take 1 Tablet by mouth every day. 90 Tablet 1    nitroglycerin (NITROSTAT) 0.4 MG SL Tab Place 1 Tablet under the tongue as needed for Chest Pain (or hypertension >180/110). 25 Tablet 3    hydroxyurea (HYDREA) 500 MG Cap Take 500 mg by mouth 2 Times a Day.       No current [facility]-ordered facility-administered  medications on file.     Health Maintenance: Patient declines influenza vaccine today, she wanted the order for Shingrix vaccine and I have provided him a printed copy of order.    Review of Systems   Constitutional:  Negative for fever.   HENT:  Negative for sore throat.    Respiratory:  Negative for cough and shortness of breath.    Cardiovascular:  Negative for chest pain.   Gastrointestinal:  Negative for nausea and vomiting.   Genitourinary:  Negative for dysuria.   Neurological:  Negative for speech change and focal weakness.   Psychiatric/Behavioral:  Negative for depression.      Objective:     Exam:  /78 (BP Location: Left arm, Patient Position: Sitting, BP Cuff Size: Adult)   Pulse (!) 103   Temp 36.6 °C (97.9 °F) (Temporal)   Wt 80.3 kg (177 lb 2.2 oz)   SpO2 97%   BMI 22.14 kg/m²  Body mass index is 22.14 kg/m².    Physical Exam  Constitutional:       Appearance: He is normal weight.   HENT:      Head: Normocephalic and atraumatic.   Cardiovascular:      Rate and Rhythm: Normal rate and regular rhythm.      Pulses: Normal pulses.      Heart sounds: Normal heart sounds. No murmur heard.  Pulmonary:      Effort: Pulmonary effort is normal. No respiratory distress.      Breath sounds: Normal breath sounds.   Neurological:      Mental Status: He is alert and oriented to person, place, and time.   Psychiatric:         Mood and Affect: Mood normal.         Thought Content: Thought content normal.     Labs: Reviewed CBC, BMP, troponin from 8/19/2022.     Assessment & Plan:   Francesco  is a pleasant 72 y.o. male with the following -     Problem List Items Addressed This Visit       Chronic systolic heart failure (HCC) (Chronic)     He does not have signs of fluid overload, he was not taking his Farxiga, I advised to resume, refill sent to the pharmacy.  He is meeting with Myrtle next week to discuss further management and potential AICD placement.         Relevant Medications    dapagliflozin  propanediol (FARXIGA) 10 MG Tab    eplerenone (INSPRA) 25 MG Tab    Benign prostatic hyperplasia with urinary retention     Continue finasteride and Flomax, Andres catheter was discontinued by urology and patient is able to urinate without difficulties.         Type 2 diabetes mellitus with hyperglycemia (HCC)     Currently uncontrolled, continue Tresiba as prescribed, resume Farxiga, monitor blood glucose at home, follow-up with Dr. Mckeon         Relevant Medications    dapagliflozin propanediol (FARXIGA) 10 MG Tab     Other Visit Diagnoses       Need for vaccination        Relevant Medications    Zoster Vac Recomb Adjuvanted (SHINGRIX) 50 MCG/0.5ML Recon Susp    Type 2 diabetes mellitus without complication, with long-term current use of insulin (HCC)        Relevant Medications    dapagliflozin propanediol (FARXIGA) 10 MG Tab    Other Relevant Orders    HEMOGLOBIN A1C            Return in about 3 months (around 12/14/2022), or if symptoms worsen or fail to improve.    Please note that this dictation was created using voice recognition software. I have made every reasonable attempt to correct obvious errors, but I expect that there are errors of grammar and possibly content that I did not discover before finalizing the note.

## 2022-09-14 NOTE — ASSESSMENT & PLAN NOTE
He does not have signs of fluid overload, he was not taking his Farxiga, I advised to resume, refill sent to the pharmacy.  He is meeting with [place] next week to discuss further management and potential AICD placement.

## 2022-09-14 NOTE — ASSESSMENT & PLAN NOTE
Continue finasteride and Flomax, Andres catheter was discontinued by urology and patient is able to urinate without difficulties.

## 2022-09-14 NOTE — ASSESSMENT & PLAN NOTE
Currently uncontrolled, continue Tresiba as prescribed, resume Farxiga, monitor blood glucose at home, follow-up with Dr. Mckeon

## 2022-10-17 ENCOUNTER — HOSPITAL ENCOUNTER (OUTPATIENT)
Dept: LAB | Facility: MEDICAL CENTER | Age: 72
End: 2022-10-17
Attending: INTERNAL MEDICINE
Payer: MEDICARE

## 2022-10-17 LAB
ANION GAP SERPL CALC-SCNC: 14 MMOL/L (ref 7–16)
BUN SERPL-MCNC: 25 MG/DL (ref 8–22)
CALCIUM SERPL-MCNC: 9.9 MG/DL (ref 8.4–10.2)
CHLORIDE SERPL-SCNC: 100 MMOL/L (ref 96–112)
CO2 SERPL-SCNC: 20 MMOL/L (ref 20–33)
CREAT SERPL-MCNC: 1.08 MG/DL (ref 0.5–1.4)
GFR SERPLBLD CREATININE-BSD FMLA CKD-EPI: 74 ML/MIN/1.73 M 2
GLUCOSE SERPL-MCNC: 176 MG/DL (ref 65–99)
NT-PROBNP SERPL IA-MCNC: 1168 PG/ML (ref 0–125)
POTASSIUM SERPL-SCNC: 4 MMOL/L (ref 3.6–5.5)
SODIUM SERPL-SCNC: 134 MMOL/L (ref 135–145)

## 2022-10-17 PROCEDURE — 36415 COLL VENOUS BLD VENIPUNCTURE: CPT

## 2022-10-17 PROCEDURE — 80048 BASIC METABOLIC PNL TOTAL CA: CPT

## 2022-10-17 PROCEDURE — 83880 ASSAY OF NATRIURETIC PEPTIDE: CPT

## 2022-10-18 ENCOUNTER — PATIENT MESSAGE (OUTPATIENT)
Dept: CARDIOLOGY | Facility: MEDICAL CENTER | Age: 72
End: 2022-10-18
Payer: MEDICARE

## 2022-11-10 ENCOUNTER — PATIENT MESSAGE (OUTPATIENT)
Dept: HEALTH INFORMATION MANAGEMENT | Facility: OTHER | Age: 72
End: 2022-11-10

## 2022-12-02 ENCOUNTER — PATIENT MESSAGE (OUTPATIENT)
Dept: ENDOCRINOLOGY | Facility: MEDICAL CENTER | Age: 72
End: 2022-12-02
Payer: MEDICARE

## 2022-12-05 ENCOUNTER — OFFICE VISIT (OUTPATIENT)
Dept: ENDOCRINOLOGY | Facility: MEDICAL CENTER | Age: 72
End: 2022-12-05
Attending: INTERNAL MEDICINE
Payer: MEDICARE

## 2022-12-05 VITALS
BODY MASS INDEX: 21.05 KG/M2 | DIASTOLIC BLOOD PRESSURE: 78 MMHG | WEIGHT: 164 LBS | HEART RATE: 101 BPM | OXYGEN SATURATION: 99 % | HEIGHT: 74 IN | SYSTOLIC BLOOD PRESSURE: 106 MMHG

## 2022-12-05 DIAGNOSIS — Z78.9 STATIN INTOLERANCE: ICD-10-CM

## 2022-12-05 DIAGNOSIS — E78.5 DYSLIPIDEMIA: ICD-10-CM

## 2022-12-05 DIAGNOSIS — E55.9 VITAMIN D DEFICIENCY: ICD-10-CM

## 2022-12-05 DIAGNOSIS — Z79.4 TYPE 2 DIABETES MELLITUS WITH HYPERGLYCEMIA, WITH LONG-TERM CURRENT USE OF INSULIN (HCC): ICD-10-CM

## 2022-12-05 DIAGNOSIS — Z79.4 ENCOUNTER FOR LONG-TERM (CURRENT) USE OF INSULIN (HCC): ICD-10-CM

## 2022-12-05 DIAGNOSIS — E11.65 TYPE 2 DIABETES MELLITUS WITH HYPERGLYCEMIA, WITH LONG-TERM CURRENT USE OF INSULIN (HCC): ICD-10-CM

## 2022-12-05 LAB
HBA1C MFR BLD: 8.4 % (ref 0–5.6)
INT CON NEG: ABNORMAL
INT CON POS: ABNORMAL

## 2022-12-05 PROCEDURE — 99214 OFFICE O/P EST MOD 30 MIN: CPT | Performed by: INTERNAL MEDICINE

## 2022-12-05 PROCEDURE — 83036 HEMOGLOBIN GLYCOSYLATED A1C: CPT | Performed by: INTERNAL MEDICINE

## 2022-12-05 PROCEDURE — 99212 OFFICE O/P EST SF 10 MIN: CPT | Performed by: INTERNAL MEDICINE

## 2022-12-05 RX ORDER — INSULIN DEGLUDEC 100 U/ML
10 INJECTION, SOLUTION SUBCUTANEOUS
Qty: 15 ML | Refills: 6 | Status: SHIPPED | OUTPATIENT
Start: 2022-12-05 | End: 2024-01-01

## 2022-12-05 RX ORDER — PEN NEEDLE, DIABETIC 32GX 5/32"
1 NEEDLE, DISPOSABLE MISCELLANEOUS 3 TIMES DAILY
Qty: 100 EACH | Refills: 11 | Status: ON HOLD | OUTPATIENT
Start: 2022-12-05 | End: 2023-01-01

## 2022-12-05 ASSESSMENT — FIBROSIS 4 INDEX: FIB4 SCORE: 2.28

## 2022-12-05 NOTE — PROGRESS NOTES
CHIEF COMPLAINT: Patient is here for follow up of Type 2 Diabetes Mellitus    HPI:     Francesco Schulte is a 70 y.o. male with Type 2 Diabetes Mellitus here for follow up.      Labs from 12/5/2022 show a1c is 8.4% (lost to FU)  Labs from April 2, 2021 show A1c was 7.1%  Labs from 10/26/2020 HbA1c was 7.7%      He was previously seen by the PA  He has has a history of ischemic CMO with a non-preserved EF.  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            I last saw him on July 14, 2021 and he has not followed  up for over 18 months.      He is now on Farxiga 5mg daily  He used to be on Tresiba ( not taking cant afford)  He is Novolog sliding scale      He is checking his sugars twice a day but he forgot his glucometer and he mostly checks his postprandial BGs.  He  doesn't checking his fasting Bgs          He reports a history of statin intolerance and is currently not on atorvastatin or Crestor  He would be a candidate for Repatha if patient assistance was applied for  LDL cholesterol is 122 on March 31, 2022      He does have albuminuria and diabetic kidney disease however he has not tolerated blood pressure medications because of orthostasis   U ACR was 140 on March 31, 2022      He reports that he had an eye exam with his ophthalmologist but we do not have records        BG Diary:  Patient did not bring glucose meter despite repeated request to do so      Weight has been stable    Diabetes Complications   Retinopathy: No known retinopathy.  Last eye exam: We are trying to get records of his eye exam from his ophthalmologist  Neuropathy: Denies paresthesias or numbness in hands or feet. Denies any foot wounds.  Exercise: Minimal.  Diet: Fair.  Patient's medications, allergies, and social histories were reviewed and updated as appropriate.    ROS:     CONS:     No fever, no  chills   EYES:     No diplopia, no blurry vision   CV:           No chest pain, no palpitations   PULM:     No SOB, no cough, no hemoptysis.   GI:            No nausea, no vomiting, no diarrhea, no constipation   ENDO:     No polyuria, no polydipsia, no heat intolerance, no cold intolerance       Past Medical History:  Problem List:  2022-08: Post herpetic neuralgia  2022-08: Ileus ([facility])  2022-08: Catheter-associated urinary tract infection ([facility])  2022-07: Herpes zoster  2022-07: Acute cystitis  2022-07: Cystitis  2022-07: Medically noncompliant  2022-06: Pain with urination  2022-06: Acute non-recurrent frontal sinusitis  2022-06: Post-nasal drip  2022-05: Hyponatremia  2022-05: Urinary tract infection associated with indwelling urethral   catheter ([facility])  2022-05: Toxic encephalopathy  2021-11: Acute bacterial endocarditis  2021-10: Ischemic cardiomyopathy  2021-05: Cholelithiasis  2021-05: Hypertensive urgency  2021-01: Community acquired pneumonia  2020-11: Drug-induced neutropenia ([facility])  2020-10: Insomnia due to medical condition  2020-10: Pyelonephritis with bacteremia   2020-10: Pericardial effusion  2020-09: QT prolongation  2020-09: Medication side effect  2020-08: Nephrolithiasis  2020-08: Urinary retention  2020-08: History of TIA (transient ischemic attack)  2020-08: Constipation  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  2020-07: SLE (systemic lupus erythematosus related syndrome) ([facility])  2020-07: Pancreatic cyst  2020-07: Lip swelling  2020-07: Medication intolerance  2020-07: Generalized pain  2020-07: Elevated brain natriuretic peptide (BNP) level  2019-11: Lupus ([facility])  2019-05: Encounter for long-term (current) use of insulin ([facility])  2019-04: Indigestion  2019-01: Fatigue  2019-01: Chest pain  2019-01: Erythromelalgia ([facility])  2019-01: Polycythemia vera ([facility])  2018-10: Multiple joint pain  2018-08: Vertigo  2018-08: Elevated lactic acid level  2018-08: Nausea  2018-04: Elevated sed rate- R/O PMR  2018-04:  New daily persistent headache- R/O PMR; trial steroids  2018-04: Nonintractable episodic headache  2018-03: Orthostatic hypotension  2018-03: Stenosis of right carotid artery-Right CEA 3/21/18  2018-03: Essential hypertension  2018-03: Hypercoagulable state ([facility])  2018-03: Nausea & vomiting  2018-03: Dizziness  2018-03: History of stroke- old right parietal/occipital  2018-03: Thrombocytopenia ([facility])  2018-03: Leukopenia  2018-03: Hypokalemia  2018-03: Carotid stenosis, 90% right carotid  2018-01: Obesity (BMI 30-39.9)  2017-09: Agent orange exposure  2017-09: Chronic pain syndrome  2017-09: Risk for falls  2017-08: Long term (current) use of anticoagulants [Z79.01]  2017-03: History of ST elevation myocardial infarction (STEMI)  2017-03: ARF (acute renal failure) ([facility])  2017-03: Leukocytosis  2017-03: Hyponatremia  2017-03: Coagulopathy ([facility])  2017-03: Chronic systolic heart failure ([facility])  2017-03: Type 2 diabetes mellitus with hyperglycemia ([facility])  2017-03: CAD (coronary artery disease)  2017-03: Benign prostatic hyperplasia with urinary retention  2017-03: Thrombocytosis  2017-03: Dyslipidemia  2017-03: Paroxysmal A-fib ([facility])  2017-03: Statin intolerance  2017-03: MATTHIAS (acute kidney injury) (formerly [facility])  2017-03: STEMI (ST elevation myocardial infarction) (formerly [facility])  2016-12: A-fib (formerly [facility])    Past Surgical History:  Past Surgical History:   Procedure Laterality Date    PAMELA N/A 10/30/2021    Procedure: ECHOCARDIOGRAM, TRANSESOPHAGEAL;  Surgeon: Beau Gutierrez M.D.;  Location: SURGERY [facility];  Service: Cardiac    AICD IMPLANT  07/29/2021    [employer] [id number] implanted by Dr. Isaac.    SPLIT THICKNESS SKIN GRAFT N/A 8/23/2020    Procedure: APPLICATION, GRAFT, SKIN, SPLIT-THICKNESS;  Surgeon: Elisa Craig M.D.;  Location: SURGERY [facility];  Service: Plastics    SKIN ABSCESS INCISION AND DRAINAGE Right 8/3/2020    Procedure: INCISION AND DRAINAGE - SCROTAL WOUND - WOUND VAC PLACEMENT;  Surgeon: Jaylen Hayes  "M.D.;  Location: [facility];  Service: Urology    AZ EXPLORE SCROTUM Right 7/31/2020    Procedure: EXPLORATION, SCROTUM - FOR WASH OUT OF SCROTAL WOUND & WOUND VAC PLACEMENT;  Surgeon: Ferny Rosales M.D.;  Location: [facility];  Service: Urology    AZ EXPLORE SCROTUM Right 7/29/2020    Procedure: EXPLORATION, SCROTUM - FOR I & D OF SCROTAL AND  GROIN WOUND;  Surgeon: Donta Viera M.D.;  Location: [facility];  Service: Urology    AZ INCIS/DRAIN SCROTUM/TESTIS,EPIDIDYM Right 7/25/2020    Procedure: INCISION AND DRAINAGE, SCROTUM;  Surgeon: Nick Negro M.D.;  Location: [facility];  Service: Urology    TEMPORAL ARTERY BIOPSY Right 6/26/2018    Procedure: TEMPORAL ARTERY BIOPSY;  Surgeon: Rajendra Aguilar M.D.;  Location: SURGERY SAME DAY [facility] ORS;  Service: General    CAROTID ENDARTERECTOMY Right 3/21/2018    Procedure: CAROTID ENDARTERECTOMY- W/EEG MONITORING;  Surgeon: Benny Morfin M.D.;  Location: SURGERY [facility];  Service: General    APPENDECTOMY      CATH PLACEMENT      right arm    LAMINOTOMY      diskectomy; C4    OTHER CARDIAC SURGERY      stents x 3        Allergies:  Doxycycline, Beta adrenergic blockers, Metformin, Simvastatin, and Statins [hmg-coa-r inhibitors]     Social History:  Social History     Tobacco Use    Smoking status: Never    Smokeless tobacco: Never   Vaping Use    Vaping Use: Never used   Substance Use Topics    Alcohol use: No    Drug use: No        Family History:   family history is not on file.      PHYSICAL EXAM:   OBJECTIVE:  Vital signs: /78 (BP Location: Left arm, Patient Position: Sitting, BP Cuff Size: Adult)   Pulse (!) 101   Ht 1.88 m (6' 2\")   Wt 74.4 kg (164 lb)   SpO2 99%   BMI 21.06 kg/m²   GENERAL: Well-developed, well-nourished in no apparent distress.   EYE:  No ocular asymmetry, PERRLA  HENT: Pink, moist mucous membranes.    NECK: No thyromegaly.   CARDIOVASCULAR:  No " murmurs  LUNGS: Clear breath sounds  ABDOMEN: Soft, nontender   EXTREMITIES: No clubbing, cyanosis, or edema.   NEUROLOGICAL: No gross focal motor abnormalities   LYMPH: No cervical adenopathy seen  SKIN: No rashes, lesions.       Labs:  Lab Results   Component Value Date/Time    HBA1C 8.4 (A) 12/05/2022 03:20 PM        Lab Results   Component Value Date/Time    WBC 3.2 (L) 08/22/2022 01:14 AM    RBC 3.81 (L) 08/22/2022 01:14 AM    HEMOGLOBIN 14.9 08/22/2022 01:14 AM    .5 (H) 08/22/2022 01:14 AM    MCH 39.1 (H) 08/22/2022 01:14 AM    MCHC 33.9 08/22/2022 01:14 AM    RDW 66.8 (H) 08/22/2022 01:14 AM    MPV 11.5 08/22/2022 01:14 AM       Lab Results   Component Value Date/Time    SODIUM 134 (L) 10/17/2022 02:58 PM    POTASSIUM 4.0 10/17/2022 02:58 PM    CHLORIDE 100 10/17/2022 02:58 PM    CO2 20 10/17/2022 02:58 PM    ANION 14.0 10/17/2022 02:58 PM    GLUCOSE 176 (H) 10/17/2022 02:58 PM    BUN 25 (H) 10/17/2022 02:58 PM    CREATININE 1.08 10/17/2022 02:58 PM    CALCIUM 9.9 10/17/2022 02:58 PM    ASTSGOT 20 08/18/2022 09:35 PM    ALTSGPT 21 08/18/2022 09:35 PM    TBILIRUBIN 0.6 08/18/2022 09:35 PM    ALBUMIN 2.9 (L) 08/22/2022 01:14 AM    TOTPROTEIN 7.8 08/18/2022 09:35 PM    GLOBULIN 4.3 (H) 08/18/2022 09:35 PM    AGRATIO 0.8 08/18/2022 09:35 PM       Lab Results   Component Value Date/Time    CHOLSTRLTOT 159 08/06/2019 0803    TRIGLYCERIDE 99 08/06/2019 0803    HDL 45 08/06/2019 0803    LDL 94 08/06/2019 0803       Lab Results   Component Value Date/Time    MALBCRT 140 (H) 03/31/2022 02:00 PM    MICROALBUR 4.8 03/31/2022 02:00 PM        Lab Results   Component Value Date/Time    TSHULTRASEN 0.459 10/02/2020 0420     No results found for: FREEDIR  No results found for: FREET3  No results found for: THYSTIMIG        ASSESSMENT/PLAN:     1. Type 2 diabetes mellitus with hyperglycemia, with long-term current use of insulin (HCC)  Uncontrolled secondary to hyperglycemia and poor follow-up  Ideally patient should  begin a GLP-1 because of his cardiovascular disease and cardiomyopathy however he is also very thin and his BMI is 21 and I am worried that he will waste away if he get started back on a GLP-1  He also has problems with orthostasis so we have to be careful with his Farxiga  I want him to restart Tresiba 10 units daily and he should titrate and adjust his insulin until fasting sugar is at goal  I gave him samples  Continue Farxiga 5 mg daily  I introduced him to our pharmacy coordinator so he can get enrolled into patient assistance  I want him to follow-up with me in 3 months    2. Dyslipidemia  Stable  Continue monitoring  Will discuss Repatha when he comes back    3. Statin intolerance  Patient is intolerant of atorvastatin and rosuvastatin    4. Vitamin D deficiency  Stable  We will check calcium vitamin D with future labs    5. Encounter for long-term (current) use of insulin (HCC)  Patient is on long-term insulin therapy for type 2 diabetes      Return in about 3 months (around 3/5/2023).      Thank you kindly for allowing me to participate in the diabetes care plan for this patient.    Gamaliel Stephens MD, FACE, ECNU  01/04/21    CC:   ALVINO Harman

## 2022-12-06 ENCOUNTER — PATIENT MESSAGE (OUTPATIENT)
Dept: MEDICAL GROUP | Facility: MEDICAL CENTER | Age: 72
End: 2022-12-06
Payer: MEDICARE

## 2023-01-01 ENCOUNTER — TELEPHONE (OUTPATIENT)
Dept: CARDIOLOGY | Facility: MEDICAL CENTER | Age: 73
End: 2023-01-01

## 2023-01-01 ENCOUNTER — PATIENT MESSAGE (OUTPATIENT)
Dept: HEALTH INFORMATION MANAGEMENT | Facility: OTHER | Age: 73
End: 2023-01-01

## 2023-01-01 ENCOUNTER — HOSPITAL ENCOUNTER (OUTPATIENT)
Facility: MEDICAL CENTER | Age: 73
End: 2023-04-11
Attending: INTERNAL MEDICINE
Payer: MEDICARE

## 2023-01-01 ENCOUNTER — TELEPHONE (OUTPATIENT)
Dept: CARDIOLOGY | Facility: MEDICAL CENTER | Age: 73
End: 2023-01-01
Payer: MEDICARE

## 2023-01-01 ENCOUNTER — HOSPITAL ENCOUNTER (OUTPATIENT)
Facility: MEDICAL CENTER | Age: 73
End: 2023-05-08
Attending: INTERNAL MEDICINE | Admitting: INTERNAL MEDICINE
Payer: MEDICARE

## 2023-01-01 ENCOUNTER — ANESTHESIA (OUTPATIENT)
Dept: CARDIOLOGY | Facility: MEDICAL CENTER | Age: 73
End: 2023-01-01
Payer: MEDICARE

## 2023-01-01 ENCOUNTER — OFFICE VISIT (OUTPATIENT)
Dept: CARDIOLOGY | Facility: MEDICAL CENTER | Age: 73
End: 2023-01-01
Payer: MEDICARE

## 2023-01-01 ENCOUNTER — HOSPITAL ENCOUNTER (OUTPATIENT)
Dept: LAB | Facility: MEDICAL CENTER | Age: 73
End: 2023-08-02
Attending: INTERNAL MEDICINE
Payer: MEDICARE

## 2023-01-01 ENCOUNTER — HOSPITAL ENCOUNTER (OUTPATIENT)
Facility: MEDICAL CENTER | Age: 73
End: 2023-04-05
Attending: EMERGENCY MEDICINE | Admitting: INTERNAL MEDICINE
Payer: MEDICARE

## 2023-01-01 ENCOUNTER — TELEPHONE (OUTPATIENT)
Dept: MEDICAL GROUP | Facility: MEDICAL CENTER | Age: 73
End: 2023-01-01
Payer: MEDICARE

## 2023-01-01 ENCOUNTER — APPOINTMENT (OUTPATIENT)
Dept: RADIOLOGY | Facility: MEDICAL CENTER | Age: 73
End: 2023-01-01
Attending: EMERGENCY MEDICINE
Payer: MEDICARE

## 2023-01-01 ENCOUNTER — APPOINTMENT (OUTPATIENT)
Dept: CARDIOLOGY | Facility: MEDICAL CENTER | Age: 73
End: 2023-01-01
Attending: INTERNAL MEDICINE
Payer: MEDICARE

## 2023-01-01 ENCOUNTER — ANESTHESIA EVENT (OUTPATIENT)
Dept: CARDIOLOGY | Facility: MEDICAL CENTER | Age: 73
End: 2023-01-01
Payer: MEDICARE

## 2023-01-01 ENCOUNTER — PATIENT OUTREACH (OUTPATIENT)
Dept: HEALTH INFORMATION MANAGEMENT | Facility: OTHER | Age: 73
End: 2023-01-01
Payer: MEDICARE

## 2023-01-01 ENCOUNTER — HOSPITAL ENCOUNTER (OUTPATIENT)
Dept: RADIOLOGY | Facility: MEDICAL CENTER | Age: 73
End: 2023-04-26
Attending: INTERNAL MEDICINE
Payer: MEDICARE

## 2023-01-01 ENCOUNTER — APPOINTMENT (OUTPATIENT)
Dept: ADMISSIONS | Facility: MEDICAL CENTER | Age: 73
End: 2023-01-01
Attending: INTERNAL MEDICINE
Payer: MEDICARE

## 2023-01-01 ENCOUNTER — OFFICE VISIT (OUTPATIENT)
Dept: MEDICAL GROUP | Facility: MEDICAL CENTER | Age: 73
End: 2023-01-01
Payer: MEDICARE

## 2023-01-01 ENCOUNTER — TELEPHONE (OUTPATIENT)
Dept: HEALTH INFORMATION MANAGEMENT | Facility: OTHER | Age: 73
End: 2023-01-01
Payer: MEDICARE

## 2023-01-01 ENCOUNTER — NON-PROVIDER VISIT (OUTPATIENT)
Dept: CARDIOLOGY | Facility: MEDICAL CENTER | Age: 73
End: 2023-01-01
Payer: MEDICARE

## 2023-01-01 ENCOUNTER — APPOINTMENT (OUTPATIENT)
Dept: RADIOLOGY | Facility: MEDICAL CENTER | Age: 73
End: 2023-01-01
Attending: INTERNAL MEDICINE
Payer: MEDICARE

## 2023-01-01 ENCOUNTER — HOSPITAL ENCOUNTER (OUTPATIENT)
Facility: MEDICAL CENTER | Age: 73
End: 2023-11-08
Attending: INTERNAL MEDICINE | Admitting: INTERNAL MEDICINE
Payer: MEDICARE

## 2023-01-01 VITALS
WEIGHT: 186.29 LBS | DIASTOLIC BLOOD PRESSURE: 83 MMHG | HEART RATE: 76 BPM | TEMPERATURE: 97.8 F | OXYGEN SATURATION: 97 % | HEIGHT: 75 IN | BODY MASS INDEX: 23.16 KG/M2 | RESPIRATION RATE: 18 BRPM | SYSTOLIC BLOOD PRESSURE: 126 MMHG

## 2023-01-01 VITALS
BODY MASS INDEX: 23.87 KG/M2 | OXYGEN SATURATION: 93 % | HEART RATE: 86 BPM | WEIGHT: 186 LBS | SYSTOLIC BLOOD PRESSURE: 116 MMHG | TEMPERATURE: 97.1 F | HEIGHT: 74 IN | DIASTOLIC BLOOD PRESSURE: 82 MMHG

## 2023-01-01 VITALS
HEIGHT: 74 IN | BODY MASS INDEX: 22.15 KG/M2 | RESPIRATION RATE: 18 BRPM | TEMPERATURE: 97.5 F | SYSTOLIC BLOOD PRESSURE: 142 MMHG | WEIGHT: 172.62 LBS | HEART RATE: 76 BPM | OXYGEN SATURATION: 95 % | DIASTOLIC BLOOD PRESSURE: 76 MMHG

## 2023-01-01 VITALS
WEIGHT: 176.37 LBS | TEMPERATURE: 96.9 F | DIASTOLIC BLOOD PRESSURE: 84 MMHG | HEART RATE: 88 BPM | OXYGEN SATURATION: 96 % | SYSTOLIC BLOOD PRESSURE: 124 MMHG | BODY MASS INDEX: 22.63 KG/M2 | HEIGHT: 74 IN

## 2023-01-01 VITALS
WEIGHT: 180 LBS | HEART RATE: 101 BPM | DIASTOLIC BLOOD PRESSURE: 84 MMHG | OXYGEN SATURATION: 99 % | HEIGHT: 74 IN | BODY MASS INDEX: 23.1 KG/M2 | RESPIRATION RATE: 14 BRPM | SYSTOLIC BLOOD PRESSURE: 126 MMHG

## 2023-01-01 VITALS
RESPIRATION RATE: 18 BRPM | DIASTOLIC BLOOD PRESSURE: 64 MMHG | HEIGHT: 74 IN | TEMPERATURE: 98.7 F | SYSTOLIC BLOOD PRESSURE: 126 MMHG | BODY MASS INDEX: 22.1 KG/M2 | OXYGEN SATURATION: 95 % | WEIGHT: 172.2 LBS | HEART RATE: 67 BPM

## 2023-01-01 DIAGNOSIS — D75.839 THROMBOCYTOSIS: ICD-10-CM

## 2023-01-01 DIAGNOSIS — Z79.4 TYPE 2 DIABETES MELLITUS WITH HYPERGLYCEMIA, WITH LONG-TERM CURRENT USE OF INSULIN (HCC): ICD-10-CM

## 2023-01-01 DIAGNOSIS — E55.9 VITAMIN D DEFICIENCY: ICD-10-CM

## 2023-01-01 DIAGNOSIS — B35.1 ONYCHOMYCOSIS: ICD-10-CM

## 2023-01-01 DIAGNOSIS — E11.9 TYPE 2 DIABETES MELLITUS WITHOUT COMPLICATION, WITH LONG-TERM CURRENT USE OF INSULIN (HCC): ICD-10-CM

## 2023-01-01 DIAGNOSIS — I25.5 ISCHEMIC CARDIOMYOPATHY: ICD-10-CM

## 2023-01-01 DIAGNOSIS — D45 POLYCYTHEMIA VERA (HCC): ICD-10-CM

## 2023-01-01 DIAGNOSIS — Z79.4 ENCOUNTER FOR LONG-TERM (CURRENT) USE OF INSULIN (HCC): ICD-10-CM

## 2023-01-01 DIAGNOSIS — Z79.4 TYPE 2 DIABETES MELLITUS WITHOUT COMPLICATION, WITH LONG-TERM CURRENT USE OF INSULIN (HCC): ICD-10-CM

## 2023-01-01 DIAGNOSIS — Z01.810 PRE-OPERATIVE CARDIOVASCULAR EXAMINATION: ICD-10-CM

## 2023-01-01 DIAGNOSIS — I25.10 CORONARY ARTERY DISEASE INVOLVING NATIVE CORONARY ARTERY OF NATIVE HEART WITHOUT ANGINA PECTORIS: ICD-10-CM

## 2023-01-01 DIAGNOSIS — E11.65 TYPE 2 DIABETES MELLITUS WITH HYPERGLYCEMIA, WITH LONG-TERM CURRENT USE OF INSULIN (HCC): ICD-10-CM

## 2023-01-01 DIAGNOSIS — J96.01 ACUTE RESPIRATORY FAILURE WITH HYPOXIA (HCC): ICD-10-CM

## 2023-01-01 DIAGNOSIS — E78.5 DYSLIPIDEMIA: ICD-10-CM

## 2023-01-01 DIAGNOSIS — N40.1 BENIGN PROSTATIC HYPERPLASIA WITH URINARY RETENTION: ICD-10-CM

## 2023-01-01 DIAGNOSIS — I48.0 PAROXYSMAL A-FIB (HCC): Chronic | ICD-10-CM

## 2023-01-01 DIAGNOSIS — N40.0 BENIGN PROSTATIC HYPERPLASIA WITHOUT LOWER URINARY TRACT SYMPTOMS: ICD-10-CM

## 2023-01-01 DIAGNOSIS — Z09 HOSPITAL DISCHARGE FOLLOW-UP: ICD-10-CM

## 2023-01-01 DIAGNOSIS — R23.0 CYANOSIS OF TIP OF FINGER: ICD-10-CM

## 2023-01-01 DIAGNOSIS — Z01.812 PRE-OPERATIVE LABORATORY EXAMINATION: ICD-10-CM

## 2023-01-01 DIAGNOSIS — D68.59 HYPERCOAGULABLE STATE (HCC): ICD-10-CM

## 2023-01-01 DIAGNOSIS — I50.22 CHRONIC SYSTOLIC HEART FAILURE (HCC): ICD-10-CM

## 2023-01-01 DIAGNOSIS — Z78.9 STATIN INTOLERANCE: ICD-10-CM

## 2023-01-01 DIAGNOSIS — R33.8 BENIGN PROSTATIC HYPERPLASIA WITH URINARY RETENTION: ICD-10-CM

## 2023-01-01 DIAGNOSIS — Z95.810 AICD (AUTOMATIC CARDIOVERTER/DEFIBRILLATOR) PRESENT: ICD-10-CM

## 2023-01-01 DIAGNOSIS — I50.9 ACUTE ON CHRONIC CONGESTIVE HEART FAILURE, UNSPECIFIED HEART FAILURE TYPE (HCC): ICD-10-CM

## 2023-01-01 DIAGNOSIS — K52.9 GASTROENTERITIS: ICD-10-CM

## 2023-01-01 DIAGNOSIS — I10 ESSENTIAL HYPERTENSION: ICD-10-CM

## 2023-01-01 DIAGNOSIS — R10.32 LEFT LOWER QUADRANT ABDOMINAL PAIN: ICD-10-CM

## 2023-01-01 DIAGNOSIS — Z79.4 TYPE 2 DIABETES MELLITUS WITH HYPERGLYCEMIA, WITH LONG-TERM CURRENT USE OF INSULIN (HCC): Chronic | ICD-10-CM

## 2023-01-01 DIAGNOSIS — R07.9 LEFT-SIDED CHEST PAIN: ICD-10-CM

## 2023-01-01 DIAGNOSIS — E11.65 TYPE 2 DIABETES MELLITUS WITH HYPERGLYCEMIA, WITH LONG-TERM CURRENT USE OF INSULIN (HCC): Chronic | ICD-10-CM

## 2023-01-01 DIAGNOSIS — R11.2 NAUSEA AND VOMITING, UNSPECIFIED VOMITING TYPE: ICD-10-CM

## 2023-01-01 LAB
ALBUMIN SERPL BCP-MCNC: 3.2 G/DL (ref 3.2–4.9)
ALBUMIN SERPL BCP-MCNC: 3.5 G/DL (ref 3.2–4.9)
ALBUMIN SERPL BCP-MCNC: 3.6 G/DL (ref 3.2–4.9)
ALBUMIN SERPL BCP-MCNC: 3.8 G/DL (ref 3.2–4.9)
ALBUMIN/GLOB SERPL: 0.7 G/DL
ALBUMIN/GLOB SERPL: 0.7 G/DL
ALBUMIN/GLOB SERPL: 0.8 G/DL
ALBUMIN/GLOB SERPL: 0.9 G/DL
ALP SERPL-CCNC: 66 U/L (ref 30–99)
ALP SERPL-CCNC: 76 U/L (ref 30–99)
ALP SERPL-CCNC: 77 U/L (ref 30–99)
ALP SERPL-CCNC: 79 U/L (ref 30–99)
ALT SERPL-CCNC: 12 U/L (ref 2–50)
ALT SERPL-CCNC: 13 U/L (ref 2–50)
ALT SERPL-CCNC: 13 U/L (ref 2–50)
ALT SERPL-CCNC: 29 U/L (ref 2–50)
AMPHET UR QL SCN: NEGATIVE
ANION GAP SERPL CALC-SCNC: 11 MMOL/L (ref 7–16)
ANION GAP SERPL CALC-SCNC: 13 MMOL/L (ref 7–16)
ANISOCYTOSIS BLD QL SMEAR: ABNORMAL
APPEARANCE UR: CLEAR
AST SERPL-CCNC: 13 U/L (ref 12–45)
AST SERPL-CCNC: 16 U/L (ref 12–45)
AST SERPL-CCNC: 19 U/L (ref 12–45)
AST SERPL-CCNC: 29 U/L (ref 12–45)
BACTERIA #/AREA URNS HPF: ABNORMAL /HPF
BACTERIA BLD CULT: NORMAL
BACTERIA BLD CULT: NORMAL
BARBITURATES UR QL SCN: NEGATIVE
BASOPHILS # BLD AUTO: 0.4 % (ref 0–1.8)
BASOPHILS # BLD AUTO: 0.5 % (ref 0–1.8)
BASOPHILS # BLD AUTO: 1 % (ref 0–1.8)
BASOPHILS # BLD AUTO: 1.5 % (ref 0–1.8)
BASOPHILS # BLD: 0.04 K/UL (ref 0–0.12)
BASOPHILS # BLD: 0.05 K/UL (ref 0–0.12)
BASOPHILS # BLD: 0.05 K/UL (ref 0–0.12)
BASOPHILS # BLD: 0.08 K/UL (ref 0–0.12)
BENZODIAZ UR QL SCN: NEGATIVE
BILIRUB SERPL-MCNC: 0.5 MG/DL (ref 0.1–1.5)
BILIRUB SERPL-MCNC: 0.6 MG/DL (ref 0.1–1.5)
BILIRUB SERPL-MCNC: 0.8 MG/DL (ref 0.1–1.5)
BILIRUB SERPL-MCNC: 0.8 MG/DL (ref 0.1–1.5)
BILIRUB UR QL STRIP.AUTO: NEGATIVE
BUN SERPL-MCNC: 15 MG/DL (ref 8–22)
BUN SERPL-MCNC: 15 MG/DL (ref 8–22)
BUN SERPL-MCNC: 19 MG/DL (ref 8–22)
BUN SERPL-MCNC: 24 MG/DL (ref 8–22)
BZE UR QL SCN: NEGATIVE
CALCIUM ALBUM COR SERPL-MCNC: 10 MG/DL (ref 8.5–10.5)
CALCIUM ALBUM COR SERPL-MCNC: 9.6 MG/DL (ref 8.5–10.5)
CALCIUM ALBUM COR SERPL-MCNC: 9.8 MG/DL (ref 8.5–10.5)
CALCIUM ALBUM COR SERPL-MCNC: 9.9 MG/DL (ref 8.5–10.5)
CALCIUM SERPL-MCNC: 9.3 MG/DL (ref 8.4–10.2)
CALCIUM SERPL-MCNC: 9.3 MG/DL (ref 8.4–10.2)
CALCIUM SERPL-MCNC: 9.6 MG/DL (ref 8.4–10.2)
CALCIUM SERPL-MCNC: 9.6 MG/DL (ref 8.5–10.5)
CANNABINOIDS UR QL SCN: NEGATIVE
CHLORIDE SERPL-SCNC: 100 MMOL/L (ref 96–112)
CHLORIDE SERPL-SCNC: 105 MMOL/L (ref 96–112)
CHLORIDE SERPL-SCNC: 96 MMOL/L (ref 96–112)
CHLORIDE SERPL-SCNC: 96 MMOL/L (ref 96–112)
CHOLEST SERPL-MCNC: 132 MG/DL (ref 100–199)
CHOLEST SERPL-MCNC: 155 MG/DL (ref 100–199)
CO2 SERPL-SCNC: 20 MMOL/L (ref 20–33)
CO2 SERPL-SCNC: 21 MMOL/L (ref 20–33)
CO2 SERPL-SCNC: 21 MMOL/L (ref 20–33)
CO2 SERPL-SCNC: 24 MMOL/L (ref 20–33)
COLOR UR: YELLOW
CREAT SERPL-MCNC: 0.87 MG/DL (ref 0.5–1.4)
CREAT SERPL-MCNC: 0.88 MG/DL (ref 0.5–1.4)
CREAT SERPL-MCNC: 0.99 MG/DL (ref 0.5–1.4)
CREAT SERPL-MCNC: 1.06 MG/DL (ref 0.5–1.4)
CREAT UR-MCNC: 48.77 MG/DL
CREAT UR-MCNC: 74.2 MG/DL
D DIMER PPP IA.FEU-MCNC: 0.97 UG/ML (FEU) (ref 0–0.5)
EKG IMPRESSION: NORMAL
EOSINOPHIL # BLD AUTO: 0.01 K/UL (ref 0–0.51)
EOSINOPHIL # BLD AUTO: 0.02 K/UL (ref 0–0.51)
EOSINOPHIL # BLD AUTO: 0.1 K/UL (ref 0–0.51)
EOSINOPHIL # BLD AUTO: 0.2 K/UL (ref 0–0.51)
EOSINOPHIL NFR BLD: 0.1 % (ref 0–6.9)
EOSINOPHIL NFR BLD: 0.2 % (ref 0–6.9)
EOSINOPHIL NFR BLD: 2.5 % (ref 0–6.9)
EOSINOPHIL NFR BLD: 3 % (ref 0–6.9)
EPI CELLS #/AREA URNS HPF: ABNORMAL /HPF
ERYTHROCYTE [DISTWIDTH] IN BLOOD BY AUTOMATED COUNT: 54.6 FL (ref 35.9–50)
ERYTHROCYTE [DISTWIDTH] IN BLOOD BY AUTOMATED COUNT: 54.9 FL (ref 35.9–50)
ERYTHROCYTE [DISTWIDTH] IN BLOOD BY AUTOMATED COUNT: 55.9 FL (ref 35.9–50)
ERYTHROCYTE [DISTWIDTH] IN BLOOD BY AUTOMATED COUNT: 59.7 FL (ref 35.9–50)
ERYTHROCYTE [DISTWIDTH] IN BLOOD BY AUTOMATED COUNT: 70.7 FL (ref 35.9–50)
EST. AVERAGE GLUCOSE BLD GHB EST-MCNC: 160 MG/DL
EST. AVERAGE GLUCOSE BLD GHB EST-MCNC: 183 MG/DL
FASTING STATUS PATIENT QL REPORTED: NORMAL
FLUAV RNA SPEC QL NAA+PROBE: NEGATIVE
FLUBV RNA SPEC QL NAA+PROBE: NEGATIVE
GFR SERPLBLD CREATININE-BSD FMLA CKD-EPI: 75 ML/MIN/1.73 M 2
GFR SERPLBLD CREATININE-BSD FMLA CKD-EPI: 81 ML/MIN/1.73 M 2
GFR SERPLBLD CREATININE-BSD FMLA CKD-EPI: 92 ML/MIN/1.73 M 2
GFR SERPLBLD CREATININE-BSD FMLA CKD-EPI: 92 ML/MIN/1.73 M 2
GLOBULIN SER CALC-MCNC: 4.2 G/DL (ref 1.9–3.5)
GLOBULIN SER CALC-MCNC: 4.8 G/DL (ref 1.9–3.5)
GLOBULIN SER CALC-MCNC: 4.8 G/DL (ref 1.9–3.5)
GLOBULIN SER CALC-MCNC: 5 G/DL (ref 1.9–3.5)
GLUCOSE BLD STRIP.AUTO-MCNC: 110 MG/DL (ref 65–99)
GLUCOSE BLD STRIP.AUTO-MCNC: 118 MG/DL (ref 65–99)
GLUCOSE BLD STRIP.AUTO-MCNC: 154 MG/DL (ref 65–99)
GLUCOSE BLD STRIP.AUTO-MCNC: 178 MG/DL (ref 65–99)
GLUCOSE BLD STRIP.AUTO-MCNC: 203 MG/DL (ref 65–99)
GLUCOSE BLD STRIP.AUTO-MCNC: 266 MG/DL (ref 65–99)
GLUCOSE BLD STRIP.AUTO-MCNC: 90 MG/DL (ref 65–99)
GLUCOSE SERPL-MCNC: 127 MG/DL (ref 65–99)
GLUCOSE SERPL-MCNC: 255 MG/DL (ref 65–99)
GLUCOSE SERPL-MCNC: 257 MG/DL (ref 65–99)
GLUCOSE SERPL-MCNC: 264 MG/DL (ref 65–99)
GLUCOSE UR STRIP.AUTO-MCNC: 100 MG/DL
HBA1C MFR BLD: 6 % (ref ?–5.8)
HBA1C MFR BLD: 7.2 % (ref 4–5.6)
HBA1C MFR BLD: 8 % (ref 4–5.6)
HCT VFR BLD AUTO: 44.1 % (ref 42–52)
HCT VFR BLD AUTO: 46.7 % (ref 42–52)
HCT VFR BLD AUTO: 49.1 % (ref 42–52)
HCT VFR BLD AUTO: 51.5 % (ref 42–52)
HCT VFR BLD AUTO: 51.5 % (ref 42–52)
HDLC SERPL-MCNC: 34 MG/DL
HDLC SERPL-MCNC: 43 MG/DL
HGB BLD-MCNC: 14.8 G/DL (ref 14–18)
HGB BLD-MCNC: 15.9 G/DL (ref 14–18)
HGB BLD-MCNC: 16.2 G/DL (ref 14–18)
HGB BLD-MCNC: 17 G/DL (ref 14–18)
HGB BLD-MCNC: 17.1 G/DL (ref 14–18)
HGB RETIC QN AUTO: 27.9 PG/CELL (ref 29–35)
IMM GRANULOCYTES # BLD AUTO: 0.02 K/UL (ref 0–0.11)
IMM GRANULOCYTES # BLD AUTO: 0.08 K/UL (ref 0–0.11)
IMM GRANULOCYTES # BLD AUTO: 0.11 K/UL (ref 0–0.11)
IMM GRANULOCYTES # BLD AUTO: 0.15 K/UL (ref 0–0.11)
IMM GRANULOCYTES NFR BLD AUTO: 0.6 % (ref 0–0.9)
IMM GRANULOCYTES NFR BLD AUTO: 1 % (ref 0–0.9)
IMM GRANULOCYTES NFR BLD AUTO: 1.1 % (ref 0–0.9)
IMM GRANULOCYTES NFR BLD AUTO: 1.3 % (ref 0–0.9)
IMM RETICS NFR: 19.8 % (ref 2.6–16.1)
INR PPP: 1.3 (ref 0.87–1.13)
INR PPP: 1.54 (ref 0.87–1.13)
KETONES UR STRIP.AUTO-MCNC: NEGATIVE MG/DL
LACTATE SERPL-SCNC: 0.8 MMOL/L (ref 0.5–2)
LACTATE SERPL-SCNC: 1.8 MMOL/L (ref 0.5–2)
LDLC SERPL CALC-MCNC: 88 MG/DL
LDLC SERPL CALC-MCNC: 97 MG/DL
LEUKOCYTE ESTERASE UR QL STRIP.AUTO: ABNORMAL
LIPASE SERPL-CCNC: 22 U/L (ref 7–58)
LV EJECT FRACT  99904: 25
LV EJECT FRACT MOD 2C 99903: 32.68
LV EJECT FRACT MOD 4C 99902: 14.41
LV EJECT FRACT MOD BP 99901: 20.93
LYMPHOCYTES # BLD AUTO: 0.32 K/UL (ref 1–4.8)
LYMPHOCYTES # BLD AUTO: 0.35 K/UL (ref 1–4.8)
LYMPHOCYTES # BLD AUTO: 0.48 K/UL (ref 1–4.8)
LYMPHOCYTES # BLD AUTO: 0.54 K/UL (ref 1–4.8)
LYMPHOCYTES NFR BLD: 3.1 % (ref 22–41)
LYMPHOCYTES NFR BLD: 4.7 % (ref 22–41)
LYMPHOCYTES NFR BLD: 6.8 % (ref 22–41)
LYMPHOCYTES NFR BLD: 9.6 % (ref 22–41)
MACROCYTES BLD QL SMEAR: ABNORMAL
MAGNESIUM SERPL-MCNC: 1.8 MG/DL (ref 1.5–2.5)
MCH RBC QN AUTO: 34.9 PG (ref 27–33)
MCH RBC QN AUTO: 35.4 PG (ref 27–33)
MCH RBC QN AUTO: 35.9 PG (ref 27–33)
MCH RBC QN AUTO: 37.3 PG (ref 27–33)
MCH RBC QN AUTO: 37.9 PG (ref 27–33)
MCHC RBC AUTO-ENTMCNC: 33 G/DL (ref 32.3–36.5)
MCHC RBC AUTO-ENTMCNC: 33 G/DL (ref 33.7–35.3)
MCHC RBC AUTO-ENTMCNC: 33.2 G/DL (ref 32.3–36.5)
MCHC RBC AUTO-ENTMCNC: 33.6 G/DL (ref 33.7–35.3)
MCHC RBC AUTO-ENTMCNC: 34 G/DL (ref 33.7–35.3)
MCV RBC AUTO: 105.1 FL (ref 81.4–97.8)
MCV RBC AUTO: 107.4 FL (ref 81.4–97.8)
MCV RBC AUTO: 108.9 FL (ref 81.4–97.8)
MCV RBC AUTO: 111.1 FL (ref 81.4–97.8)
MCV RBC AUTO: 111.2 FL (ref 81.4–97.8)
METHADONE UR QL SCN: NEGATIVE
MICRO URNS: ABNORMAL
MICROALBUMIN UR-MCNC: 2.7 MG/DL
MICROALBUMIN UR-MCNC: 4.1 MG/DL
MICROALBUMIN/CREAT UR: 36 MG/G (ref 0–30)
MICROALBUMIN/CREAT UR: 84 MG/G (ref 0–30)
MONOCYTES # BLD AUTO: 0.22 K/UL (ref 0–0.85)
MONOCYTES # BLD AUTO: 0.33 K/UL (ref 0–0.85)
MONOCYTES # BLD AUTO: 0.76 K/UL (ref 0–0.85)
MONOCYTES # BLD AUTO: 0.81 K/UL (ref 0–0.85)
MONOCYTES NFR BLD AUTO: 4.2 % (ref 0–13.4)
MONOCYTES NFR BLD AUTO: 6.6 % (ref 0–13.4)
MONOCYTES NFR BLD AUTO: 6.7 % (ref 0–13.4)
MONOCYTES NFR BLD AUTO: 7.9 % (ref 0–13.4)
NEUTROPHILS # BLD AUTO: 10.08 K/UL (ref 1.82–7.42)
NEUTROPHILS # BLD AUTO: 2.63 K/UL (ref 1.82–7.42)
NEUTROPHILS # BLD AUTO: 6.69 K/UL (ref 1.82–7.42)
NEUTROPHILS # BLD AUTO: 8.8 K/UL (ref 1.82–7.42)
NEUTROPHILS NFR BLD: 78.7 % (ref 44–72)
NEUTROPHILS NFR BLD: 84.5 % (ref 44–72)
NEUTROPHILS NFR BLD: 85.6 % (ref 44–72)
NEUTROPHILS NFR BLD: 88.4 % (ref 44–72)
NITRITE UR QL STRIP.AUTO: POSITIVE
NRBC # BLD AUTO: 0 K/UL
NRBC BLD-RTO: 0 /100 WBC
NRBC BLD-RTO: 0 /100 WBC
NRBC BLD-RTO: 0 /100 WBC (ref 0–0.2)
NRBC BLD-RTO: 0 /100 WBC (ref 0–0.2)
NT-PROBNP SERPL IA-MCNC: 3537 PG/ML (ref 0–125)
OPIATES UR QL SCN: POSITIVE
OXYCODONE UR QL SCN: NEGATIVE
PATHOLOGY CONSULT NOTE: NORMAL
PCP UR QL SCN: NEGATIVE
PH UR STRIP.AUTO: 6 [PH] (ref 5–8)
PLATELET # BLD AUTO: 124 K/UL (ref 164–446)
PLATELET # BLD AUTO: 145 K/UL (ref 164–446)
PLATELET # BLD AUTO: 200 K/UL (ref 164–446)
PLATELET # BLD AUTO: 242 K/UL (ref 164–446)
PLATELET # BLD AUTO: 300 K/UL (ref 164–446)
PLATELET BLD QL SMEAR: NORMAL
PMV BLD AUTO: 10.7 FL (ref 9–12.9)
PMV BLD AUTO: 10.8 FL (ref 9–12.9)
PMV BLD AUTO: 11.1 FL (ref 9–12.9)
PMV BLD AUTO: 12.7 FL (ref 9–12.9)
PMV BLD AUTO: 12.9 FL (ref 9–12.9)
POCT INT CON NEG: NEGATIVE
POCT INT CON POS: POSITIVE
POTASSIUM SERPL-SCNC: 3.9 MMOL/L (ref 3.6–5.5)
POTASSIUM SERPL-SCNC: 4.1 MMOL/L (ref 3.6–5.5)
POTASSIUM SERPL-SCNC: 4.2 MMOL/L (ref 3.6–5.5)
POTASSIUM SERPL-SCNC: 4.2 MMOL/L (ref 3.6–5.5)
PROPOXYPH UR QL SCN: NEGATIVE
PROT SERPL-MCNC: 7.8 G/DL (ref 6–8.2)
PROT SERPL-MCNC: 8 G/DL (ref 6–8.2)
PROT SERPL-MCNC: 8.5 G/DL (ref 6–8.2)
PROT SERPL-MCNC: 8.6 G/DL (ref 6–8.2)
PROT UR QL STRIP: NEGATIVE MG/DL
PROTHROMBIN TIME: 16 SEC (ref 12–14.6)
PROTHROMBIN TIME: 18.1 SEC (ref 12–14.6)
RBC # BLD AUTO: 3.97 M/UL (ref 4.7–6.1)
RBC # BLD AUTO: 4.2 M/UL (ref 4.7–6.1)
RBC # BLD AUTO: 4.57 M/UL (ref 4.7–6.1)
RBC # BLD AUTO: 4.73 M/UL (ref 4.7–6.1)
RBC # BLD AUTO: 4.9 M/UL (ref 4.7–6.1)
RBC # URNS HPF: ABNORMAL /HPF
RBC BLD AUTO: PRESENT
RBC UR QL AUTO: ABNORMAL
RETICS # AUTO: 0.11 M/UL (ref 0.04–0.12)
RETICS/RBC NFR: 2.3 % (ref 0.8–2.6)
RETINAL SCREEN: NEGATIVE
RSV RNA SPEC QL NAA+PROBE: NEGATIVE
SARS-COV-2 RNA RESP QL NAA+PROBE: NOTDETECTED
SIGNIFICANT IND 70042: NORMAL
SIGNIFICANT IND 70042: NORMAL
SITE SITE: NORMAL
SITE SITE: NORMAL
SODIUM SERPL-SCNC: 129 MMOL/L (ref 135–145)
SODIUM SERPL-SCNC: 130 MMOL/L (ref 135–145)
SODIUM SERPL-SCNC: 137 MMOL/L (ref 135–145)
SODIUM SERPL-SCNC: 137 MMOL/L (ref 135–145)
SOURCE SOURCE: NORMAL
SOURCE SOURCE: NORMAL
SP GR UR STRIP.AUTO: 1.01
SPECIMEN SOURCE: 1
T4 FREE SERPL-MCNC: 1.19 NG/DL (ref 0.93–1.7)
TRIGL SERPL-MCNC: 49 MG/DL (ref 0–149)
TRIGL SERPL-MCNC: 77 MG/DL (ref 0–149)
TROPONIN T SERPL-MCNC: 16 NG/L (ref 6–19)
TROPONIN T SERPL-MCNC: 20 NG/L (ref 6–19)
TSH SERPL DL<=0.005 MIU/L-ACNC: 0.82 UIU/ML (ref 0.38–5.33)
TSH SERPL DL<=0.005 MIU/L-ACNC: 2.15 UIU/ML (ref 0.38–5.33)
WBC # BLD AUTO: 10.3 K/UL (ref 4.8–10.8)
WBC # BLD AUTO: 11.4 K/UL (ref 4.8–10.8)
WBC # BLD AUTO: 3.3 K/UL (ref 4.8–10.8)
WBC # BLD AUTO: 6 K/UL (ref 4.8–10.8)
WBC # BLD AUTO: 7.9 K/UL (ref 4.8–10.8)
WBC #/AREA URNS HPF: ABNORMAL /HPF

## 2023-01-01 PROCEDURE — 88184 FLOWCYTOMETRY/ TC 1 MARKER: CPT

## 2023-01-01 PROCEDURE — 84484 ASSAY OF TROPONIN QUANT: CPT

## 2023-01-01 PROCEDURE — 96374 THER/PROPH/DIAG INJ IV PUSH: CPT

## 2023-01-01 PROCEDURE — 93922 UPR/L XTREMITY ART 2 LEVELS: CPT

## 2023-01-01 PROCEDURE — 700117 HCHG RX CONTRAST REV CODE 255: Performed by: INTERNAL MEDICINE

## 2023-01-01 PROCEDURE — 93010 ELECTROCARDIOGRAM REPORT: CPT | Performed by: INTERNAL MEDICINE

## 2023-01-01 PROCEDURE — 94760 N-INVAS EAR/PLS OXIMETRY 1: CPT

## 2023-01-01 PROCEDURE — 99100 ANES PT EXTEME AGE<1 YR&>70: CPT | Performed by: ANESTHESIOLOGY

## 2023-01-01 PROCEDURE — 93306 TTE W/DOPPLER COMPLETE: CPT

## 2023-01-01 PROCEDURE — 88305 TISSUE EXAM BY PATHOLOGIST: CPT

## 2023-01-01 PROCEDURE — C1722 AICD, SINGLE CHAMBER: HCPCS

## 2023-01-01 PROCEDURE — G0378 HOSPITAL OBSERVATION PER HR: HCPCS

## 2023-01-01 PROCEDURE — 700102 HCHG RX REV CODE 250 W/ 637 OVERRIDE(OP): Performed by: INTERNAL MEDICINE

## 2023-01-01 PROCEDURE — 81001 URINALYSIS AUTO W/SCOPE: CPT

## 2023-01-01 PROCEDURE — 99215 OFFICE O/P EST HI 40 MIN: CPT | Mod: 25 | Performed by: INTERNAL MEDICINE

## 2023-01-01 PROCEDURE — 82043 UR ALBUMIN QUANTITATIVE: CPT

## 2023-01-01 PROCEDURE — 700105 HCHG RX REV CODE 258: Performed by: INTERNAL MEDICINE

## 2023-01-01 PROCEDURE — 88237 TISSUE CULTURE BONE MARROW: CPT

## 2023-01-01 PROCEDURE — 71045 X-RAY EXAM CHEST 1 VIEW: CPT

## 2023-01-01 PROCEDURE — 88185 FLOWCYTOMETRY/TC ADD-ON: CPT | Mod: 91

## 2023-01-01 PROCEDURE — 83605 ASSAY OF LACTIC ACID: CPT | Mod: 91

## 2023-01-01 PROCEDURE — 160048 HCHG OR STATISTICAL LEVEL 1-5: Performed by: INTERNAL MEDICINE

## 2023-01-01 PROCEDURE — 3074F SYST BP LT 130 MM HG: CPT | Performed by: INTERNAL MEDICINE

## 2023-01-01 PROCEDURE — 93005 ELECTROCARDIOGRAM TRACING: CPT

## 2023-01-01 PROCEDURE — 80053 COMPREHEN METABOLIC PANEL: CPT

## 2023-01-01 PROCEDURE — 85025 COMPLETE CBC W/AUTO DIFF WBC: CPT

## 2023-01-01 PROCEDURE — 85610 PROTHROMBIN TIME: CPT

## 2023-01-01 PROCEDURE — 82570 ASSAY OF URINE CREATININE: CPT

## 2023-01-01 PROCEDURE — 160046 HCHG PACU - 1ST 60 MINS PHASE II: Performed by: INTERNAL MEDICINE

## 2023-01-01 PROCEDURE — 700111 HCHG RX REV CODE 636 W/ 250 OVERRIDE (IP): Performed by: INTERNAL MEDICINE

## 2023-01-01 PROCEDURE — 36415 COLL VENOUS BLD VENIPUNCTURE: CPT

## 2023-01-01 PROCEDURE — 770020 HCHG ROOM/CARE - TELE (206)

## 2023-01-01 PROCEDURE — 700105 HCHG RX REV CODE 258: Performed by: EMERGENCY MEDICINE

## 2023-01-01 PROCEDURE — 93306 TTE W/DOPPLER COMPLETE: CPT | Mod: 26 | Performed by: INTERNAL MEDICINE

## 2023-01-01 PROCEDURE — 99214 OFFICE O/P EST MOD 30 MIN: CPT | Performed by: INTERNAL MEDICINE

## 2023-01-01 PROCEDURE — 00534 ANES INSERTION/RPLCMT CVDFB: CPT | Performed by: ANESTHESIOLOGY

## 2023-01-01 PROCEDURE — 99239 HOSP IP/OBS DSCHRG MGMT >30: CPT | Performed by: HOSPITALIST

## 2023-01-01 PROCEDURE — 82962 GLUCOSE BLOOD TEST: CPT

## 2023-01-01 PROCEDURE — 93000 ELECTROCARDIOGRAM COMPLETE: CPT | Performed by: INTERNAL MEDICINE

## 2023-01-01 PROCEDURE — 85046 RETICYTE/HGB CONCENTRATE: CPT

## 2023-01-01 PROCEDURE — 160036 HCHG PACU - EA ADDL 30 MINS PHASE I

## 2023-01-01 PROCEDURE — 96375 TX/PRO/DX INJ NEW DRUG ADDON: CPT

## 2023-01-01 PROCEDURE — 160027 HCHG SURGERY MINUTES - 1ST 30 MINS LEVEL 2: Performed by: INTERNAL MEDICINE

## 2023-01-01 PROCEDURE — 700111 HCHG RX REV CODE 636 W/ 250 OVERRIDE (IP)

## 2023-01-01 PROCEDURE — 80061 LIPID PANEL: CPT

## 2023-01-01 PROCEDURE — 160035 HCHG PACU - 1ST 60 MINS PHASE I

## 2023-01-01 PROCEDURE — 88264 CHROMOSOME ANALYSIS 20-25: CPT

## 2023-01-01 PROCEDURE — 93005 ELECTROCARDIOGRAM TRACING: CPT | Performed by: INTERNAL MEDICINE

## 2023-01-01 PROCEDURE — 700111 HCHG RX REV CODE 636 W/ 250 OVERRIDE (IP): Performed by: ANESTHESIOLOGY

## 2023-01-01 PROCEDURE — 700111 HCHG RX REV CODE 636 W/ 250 OVERRIDE (IP): Performed by: HOSPITALIST

## 2023-01-01 PROCEDURE — 84439 ASSAY OF FREE THYROXINE: CPT

## 2023-01-01 PROCEDURE — 83036 HEMOGLOBIN GLYCOSYLATED A1C: CPT

## 2023-01-01 PROCEDURE — 160025 RECOVERY II MINUTES (STATS): Performed by: INTERNAL MEDICINE

## 2023-01-01 PROCEDURE — 93010 ELECTROCARDIOGRAM REPORT: CPT | Performed by: STUDENT IN AN ORGANIZED HEALTH CARE EDUCATION/TRAINING PROGRAM

## 2023-01-01 PROCEDURE — A9270 NON-COVERED ITEM OR SERVICE: HCPCS | Performed by: INTERNAL MEDICINE

## 2023-01-01 PROCEDURE — 87040 BLOOD CULTURE FOR BACTERIA: CPT | Mod: 91

## 2023-01-01 PROCEDURE — 83690 ASSAY OF LIPASE: CPT

## 2023-01-01 PROCEDURE — 700102 HCHG RX REV CODE 250 W/ 637 OVERRIDE(OP): Performed by: HOSPITALIST

## 2023-01-01 PROCEDURE — 96376 TX/PRO/DX INJ SAME DRUG ADON: CPT

## 2023-01-01 PROCEDURE — 3079F DIAST BP 80-89 MM HG: CPT | Performed by: INTERNAL MEDICINE

## 2023-01-01 PROCEDURE — 92250 FUNDUS PHOTOGRAPHY W/I&R: CPT | Mod: TC | Performed by: INTERNAL MEDICINE

## 2023-01-01 PROCEDURE — 93295 DEV INTERROG REMOTE 1/2/MLT: CPT | Performed by: INTERNAL MEDICINE

## 2023-01-01 PROCEDURE — 83036 HEMOGLOBIN GLYCOSYLATED A1C: CPT | Performed by: INTERNAL MEDICINE

## 2023-01-01 PROCEDURE — 160002 HCHG RECOVERY MINUTES (STAT)

## 2023-01-01 PROCEDURE — 93005 ELECTROCARDIOGRAM TRACING: CPT | Performed by: EMERGENCY MEDICINE

## 2023-01-01 PROCEDURE — 700105 HCHG RX REV CODE 258

## 2023-01-01 PROCEDURE — 71275 CT ANGIOGRAPHY CHEST: CPT

## 2023-01-01 PROCEDURE — 93282 PRGRMG EVAL IMPLANTABLE DFB: CPT | Performed by: INTERNAL MEDICINE

## 2023-01-01 PROCEDURE — 160046 HCHG PACU - 1ST 60 MINS PHASE II

## 2023-01-01 PROCEDURE — 700101 HCHG RX REV CODE 250: Performed by: ANESTHESIOLOGY

## 2023-01-01 PROCEDURE — 88311 DECALCIFY TISSUE: CPT

## 2023-01-01 PROCEDURE — 700117 HCHG RX CONTRAST REV CODE 255: Performed by: EMERGENCY MEDICINE

## 2023-01-01 PROCEDURE — 80307 DRUG TEST PRSMV CHEM ANLYZR: CPT

## 2023-01-01 PROCEDURE — 85379 FIBRIN DEGRADATION QUANT: CPT

## 2023-01-01 PROCEDURE — 0241U HCHG SARS-COV-2 COVID-19 NFCT DS RESP RNA 4 TRGT MIC: CPT

## 2023-01-01 PROCEDURE — 85027 COMPLETE CBC AUTOMATED: CPT

## 2023-01-01 PROCEDURE — 36415 COLL VENOUS BLD VENIPUNCTURE: CPT | Mod: GA

## 2023-01-01 PROCEDURE — 83880 ASSAY OF NATRIURETIC PEPTIDE: CPT

## 2023-01-01 PROCEDURE — 33270 INS/REP SUBQ DEFIBRILLATOR: CPT | Performed by: INTERNAL MEDICINE

## 2023-01-01 PROCEDURE — 700101 HCHG RX REV CODE 250

## 2023-01-01 PROCEDURE — 36620 INSERTION CATHETER ARTERY: CPT | Performed by: ANESTHESIOLOGY

## 2023-01-01 PROCEDURE — 38222 DX BONE MARROW BX & ASPIR: CPT | Mod: LT | Performed by: INTERNAL MEDICINE

## 2023-01-01 PROCEDURE — 83735 ASSAY OF MAGNESIUM: CPT

## 2023-01-01 PROCEDURE — 84443 ASSAY THYROID STIM HORMONE: CPT

## 2023-01-01 PROCEDURE — C9803 HOPD COVID-19 SPEC COLLECT: HCPCS | Performed by: EMERGENCY MEDICINE

## 2023-01-01 PROCEDURE — 83036 HEMOGLOBIN GLYCOSYLATED A1C: CPT | Mod: GA

## 2023-01-01 PROCEDURE — 93282 PRGRMG EVAL IMPLANTABLE DFB: CPT | Mod: 26 | Performed by: INTERNAL MEDICINE

## 2023-01-01 PROCEDURE — 99222 1ST HOSP IP/OBS MODERATE 55: CPT | Performed by: INTERNAL MEDICINE

## 2023-01-01 PROCEDURE — 700111 HCHG RX REV CODE 636 W/ 250 OVERRIDE (IP): Performed by: EMERGENCY MEDICINE

## 2023-01-01 PROCEDURE — 88313 SPECIAL STAINS GROUP 2: CPT

## 2023-01-01 PROCEDURE — 99285 EMERGENCY DEPT VISIT HI MDM: CPT

## 2023-01-01 RX ORDER — HEPARIN SODIUM 1000 [USP'U]/ML
4000 INJECTION, SOLUTION INTRAVENOUS; SUBCUTANEOUS ONCE
Status: DISCONTINUED | OUTPATIENT
Start: 2023-01-01 | End: 2023-01-01

## 2023-01-01 RX ORDER — HALOPERIDOL 5 MG/ML
1 INJECTION INTRAMUSCULAR
Status: DISCONTINUED | OUTPATIENT
Start: 2023-01-01 | End: 2023-01-01 | Stop reason: HOSPADM

## 2023-01-01 RX ORDER — AMOXICILLIN 250 MG
2 CAPSULE ORAL 2 TIMES DAILY
Status: DISCONTINUED | OUTPATIENT
Start: 2023-01-01 | End: 2023-01-01 | Stop reason: HOSPADM

## 2023-01-01 RX ORDER — FUROSEMIDE 10 MG/ML
40 INJECTION INTRAMUSCULAR; INTRAVENOUS ONCE
Status: COMPLETED | OUTPATIENT
Start: 2023-01-01 | End: 2023-01-01

## 2023-01-01 RX ORDER — NITROGLYCERIN 0.4 MG/1
0.4 TABLET SUBLINGUAL
Status: DISCONTINUED | OUTPATIENT
Start: 2023-01-01 | End: 2023-01-01 | Stop reason: HOSPADM

## 2023-01-01 RX ORDER — OXYCODONE HCL 5 MG/5 ML
5 SOLUTION, ORAL ORAL
Status: DISCONTINUED | OUTPATIENT
Start: 2023-01-01 | End: 2023-01-01 | Stop reason: HOSPADM

## 2023-01-01 RX ORDER — POTASSIUM CHLORIDE 20 MEQ/1
20 TABLET, EXTENDED RELEASE ORAL 2 TIMES DAILY
Status: DISCONTINUED | OUTPATIENT
Start: 2023-01-01 | End: 2023-01-01

## 2023-01-01 RX ORDER — ONDANSETRON 2 MG/ML
4 INJECTION INTRAMUSCULAR; INTRAVENOUS EVERY 4 HOURS PRN
Status: DISCONTINUED | OUTPATIENT
Start: 2023-01-01 | End: 2023-01-01 | Stop reason: HOSPADM

## 2023-01-01 RX ORDER — POLYETHYLENE GLYCOL 3350 17 G/17G
1 POWDER, FOR SOLUTION ORAL
Status: DISCONTINUED | OUTPATIENT
Start: 2023-01-01 | End: 2023-01-01 | Stop reason: HOSPADM

## 2023-01-01 RX ORDER — MORPHINE SULFATE 4 MG/ML
4 INJECTION INTRAVENOUS ONCE
Status: COMPLETED | OUTPATIENT
Start: 2023-01-01 | End: 2023-01-01

## 2023-01-01 RX ORDER — LIDOCAINE HYDROCHLORIDE 40 MG/ML
SOLUTION TOPICAL PRN
Status: DISCONTINUED | OUTPATIENT
Start: 2023-01-01 | End: 2023-01-01 | Stop reason: SURG

## 2023-01-01 RX ORDER — ONDANSETRON 2 MG/ML
4 INJECTION INTRAMUSCULAR; INTRAVENOUS ONCE
Status: COMPLETED | OUTPATIENT
Start: 2023-01-01 | End: 2023-01-01

## 2023-01-01 RX ORDER — HYDROCODONE BITARTRATE AND ACETAMINOPHEN 5; 325 MG/1; MG/1
2 TABLET ORAL EVERY 6 HOURS PRN
Status: DISCONTINUED | OUTPATIENT
Start: 2023-01-01 | End: 2023-01-01 | Stop reason: HOSPADM

## 2023-01-01 RX ORDER — NITROGLYCERIN 0.4 MG/1
0.4 TABLET SUBLINGUAL PRN
Qty: 25 TABLET | Refills: 3 | Status: ON HOLD | OUTPATIENT
Start: 2023-01-01 | End: 2023-01-01

## 2023-01-01 RX ORDER — BUPIVACAINE HYDROCHLORIDE 5 MG/ML
INJECTION, SOLUTION EPIDURAL; INTRACAUDAL
Status: COMPLETED
Start: 2023-01-01 | End: 2023-01-01

## 2023-01-01 RX ORDER — HYDROXYUREA 500 MG/1
CAPSULE ORAL
Status: ACTIVE
Start: 2023-01-01 | End: 2023-01-01

## 2023-01-01 RX ORDER — DICYCLOMINE HYDROCHLORIDE 10 MG/1
10 CAPSULE ORAL EVERY 6 HOURS PRN
Qty: 360 CAPSULE | Refills: 3 | Status: SHIPPED | OUTPATIENT
Start: 2023-01-01 | End: 2024-01-01 | Stop reason: SDUPTHER

## 2023-01-01 RX ORDER — SODIUM CHLORIDE, SODIUM LACTATE, POTASSIUM CHLORIDE, CALCIUM CHLORIDE 600; 310; 30; 20 MG/100ML; MG/100ML; MG/100ML; MG/100ML
INJECTION, SOLUTION INTRAVENOUS CONTINUOUS
Status: ACTIVE | OUTPATIENT
Start: 2023-01-01 | End: 2023-01-01

## 2023-01-01 RX ORDER — MEPERIDINE HYDROCHLORIDE 25 MG/ML
6.25 INJECTION INTRAMUSCULAR; INTRAVENOUS; SUBCUTANEOUS
Status: DISCONTINUED | OUTPATIENT
Start: 2023-01-01 | End: 2023-01-01 | Stop reason: HOSPADM

## 2023-01-01 RX ORDER — ONDANSETRON 2 MG/ML
INJECTION INTRAMUSCULAR; INTRAVENOUS PRN
Status: DISCONTINUED | OUTPATIENT
Start: 2023-01-01 | End: 2023-01-01 | Stop reason: SURG

## 2023-01-01 RX ORDER — FINASTERIDE 5 MG/1
5 TABLET, FILM COATED ORAL
Qty: 90 TABLET | Refills: 1 | Status: SHIPPED | OUTPATIENT
Start: 2023-01-01 | End: 2024-01-01 | Stop reason: SDUPTHER

## 2023-01-01 RX ORDER — LISINOPRIL 5 MG/1
10 TABLET ORAL
Status: DISCONTINUED | OUTPATIENT
Start: 2023-01-01 | End: 2023-01-01 | Stop reason: HOSPADM

## 2023-01-01 RX ORDER — HEPARIN SODIUM 5000 [USP'U]/100ML
0-30 INJECTION, SOLUTION INTRAVENOUS CONTINUOUS
Status: DISCONTINUED | OUTPATIENT
Start: 2023-01-01 | End: 2023-01-01

## 2023-01-01 RX ORDER — BISACODYL 10 MG
10 SUPPOSITORY, RECTAL RECTAL
Status: DISCONTINUED | OUTPATIENT
Start: 2023-01-01 | End: 2023-01-01 | Stop reason: HOSPADM

## 2023-01-01 RX ORDER — ONDANSETRON 2 MG/ML
4 INJECTION INTRAMUSCULAR; INTRAVENOUS
Status: COMPLETED | OUTPATIENT
Start: 2023-01-01 | End: 2023-01-01

## 2023-01-01 RX ORDER — FINASTERIDE 5 MG/1
5 TABLET, FILM COATED ORAL
Status: DISCONTINUED | OUTPATIENT
Start: 2023-01-01 | End: 2023-01-01 | Stop reason: HOSPADM

## 2023-01-01 RX ORDER — DIPHENHYDRAMINE HYDROCHLORIDE 50 MG/ML
12.5 INJECTION INTRAMUSCULAR; INTRAVENOUS
Status: DISCONTINUED | OUTPATIENT
Start: 2023-01-01 | End: 2023-01-01 | Stop reason: HOSPADM

## 2023-01-01 RX ORDER — DEXTROSE MONOHYDRATE 100 MG/ML
INJECTION, SOLUTION INTRAVENOUS
Status: COMPLETED
Start: 2023-01-01 | End: 2023-01-01

## 2023-01-01 RX ORDER — ACETAMINOPHEN 325 MG/1
650 TABLET ORAL EVERY 6 HOURS PRN
Status: DISCONTINUED | OUTPATIENT
Start: 2023-01-01 | End: 2023-01-01 | Stop reason: HOSPADM

## 2023-01-01 RX ORDER — TAMSULOSIN HYDROCHLORIDE 0.4 MG/1
0.4 CAPSULE ORAL DAILY
Status: DISCONTINUED | OUTPATIENT
Start: 2023-01-01 | End: 2023-01-01 | Stop reason: HOSPADM

## 2023-01-01 RX ORDER — CIPROFLOXACIN 500 MG/1
500 TABLET, FILM COATED ORAL 2 TIMES DAILY
Qty: 10 TABLET | Refills: 0 | Status: SHIPPED | OUTPATIENT
Start: 2023-01-01 | End: 2023-01-01

## 2023-01-01 RX ORDER — TAMSULOSIN HYDROCHLORIDE 0.4 MG/1
0.4 CAPSULE ORAL DAILY
Qty: 90 CAPSULE | Refills: 1 | Status: SHIPPED | OUTPATIENT
Start: 2023-01-01 | End: 2023-01-01 | Stop reason: SDUPTHER

## 2023-01-01 RX ORDER — ONDANSETRON 4 MG/1
4 TABLET, ORALLY DISINTEGRATING ORAL EVERY 4 HOURS PRN
Qty: 10 TABLET | Refills: 0 | Status: SHIPPED | OUTPATIENT
Start: 2023-01-01 | End: 2024-01-01

## 2023-01-01 RX ORDER — LIDOCAINE HYDROCHLORIDE 40 MG/ML
SOLUTION TOPICAL
Status: COMPLETED
Start: 2023-01-01 | End: 2023-01-01

## 2023-01-01 RX ORDER — FUROSEMIDE 10 MG/ML
40 INJECTION INTRAMUSCULAR; INTRAVENOUS EVERY 8 HOURS
Status: DISCONTINUED | OUTPATIENT
Start: 2023-01-01 | End: 2023-01-01

## 2023-01-01 RX ORDER — HYDROMORPHONE HYDROCHLORIDE 1 MG/ML
0.1 INJECTION, SOLUTION INTRAMUSCULAR; INTRAVENOUS; SUBCUTANEOUS
Status: DISCONTINUED | OUTPATIENT
Start: 2023-01-01 | End: 2023-01-01 | Stop reason: HOSPADM

## 2023-01-01 RX ORDER — ONDANSETRON 4 MG/1
4 TABLET, ORALLY DISINTEGRATING ORAL EVERY 4 HOURS PRN
Status: DISCONTINUED | OUTPATIENT
Start: 2023-01-01 | End: 2023-01-01 | Stop reason: HOSPADM

## 2023-01-01 RX ORDER — CEFAZOLIN SODIUM 1 G/3ML
INJECTION, POWDER, FOR SOLUTION INTRAMUSCULAR; INTRAVENOUS
Status: COMPLETED
Start: 2023-01-01 | End: 2023-01-01

## 2023-01-01 RX ORDER — HEPARIN SODIUM 1000 [USP'U]/ML
2000 INJECTION, SOLUTION INTRAVENOUS; SUBCUTANEOUS PRN
Status: DISCONTINUED | OUTPATIENT
Start: 2023-01-01 | End: 2023-01-01

## 2023-01-01 RX ORDER — METRONIDAZOLE 500 MG/1
500 TABLET ORAL EVERY 8 HOURS
Qty: 15 TABLET | Refills: 0 | Status: SHIPPED | OUTPATIENT
Start: 2023-01-01 | End: 2023-01-01

## 2023-01-01 RX ORDER — MIDAZOLAM HYDROCHLORIDE 1 MG/ML
INJECTION INTRAMUSCULAR; INTRAVENOUS
Status: DISCONTINUED
Start: 2023-01-01 | End: 2023-01-01 | Stop reason: HOSPADM

## 2023-01-01 RX ORDER — HYDROXYUREA 500 MG/1
500 CAPSULE ORAL 2 TIMES DAILY
Status: DISCONTINUED | OUTPATIENT
Start: 2023-01-01 | End: 2023-01-01 | Stop reason: HOSPADM

## 2023-01-01 RX ORDER — SODIUM CHLORIDE 9 MG/ML
1000 INJECTION, SOLUTION INTRAVENOUS ONCE
Status: COMPLETED | OUTPATIENT
Start: 2023-01-01 | End: 2023-01-01

## 2023-01-01 RX ORDER — SODIUM CHLORIDE, SODIUM LACTATE, POTASSIUM CHLORIDE, CALCIUM CHLORIDE 600; 310; 30; 20 MG/100ML; MG/100ML; MG/100ML; MG/100ML
INJECTION, SOLUTION INTRAVENOUS CONTINUOUS
Status: DISCONTINUED | OUTPATIENT
Start: 2023-01-01 | End: 2023-01-01 | Stop reason: HOSPADM

## 2023-01-01 RX ORDER — HYDROMORPHONE HYDROCHLORIDE 1 MG/ML
0.4 INJECTION, SOLUTION INTRAMUSCULAR; INTRAVENOUS; SUBCUTANEOUS
Status: DISCONTINUED | OUTPATIENT
Start: 2023-01-01 | End: 2023-01-01 | Stop reason: HOSPADM

## 2023-01-01 RX ORDER — HYDROMORPHONE HYDROCHLORIDE 1 MG/ML
0.2 INJECTION, SOLUTION INTRAMUSCULAR; INTRAVENOUS; SUBCUTANEOUS
Status: DISCONTINUED | OUTPATIENT
Start: 2023-01-01 | End: 2023-01-01 | Stop reason: HOSPADM

## 2023-01-01 RX ORDER — DEXAMETHASONE SODIUM PHOSPHATE 4 MG/ML
INJECTION, SOLUTION INTRA-ARTICULAR; INTRALESIONAL; INTRAMUSCULAR; INTRAVENOUS; SOFT TISSUE PRN
Status: DISCONTINUED | OUTPATIENT
Start: 2023-01-01 | End: 2023-01-01 | Stop reason: SURG

## 2023-01-01 RX ORDER — CEFAZOLIN SODIUM 1 G/3ML
INJECTION, POWDER, FOR SOLUTION INTRAMUSCULAR; INTRAVENOUS PRN
Status: DISCONTINUED | OUTPATIENT
Start: 2023-01-01 | End: 2023-01-01 | Stop reason: SURG

## 2023-01-01 RX ORDER — LISINOPRIL 5 MG/1
5 TABLET ORAL DAILY
Qty: 30 TABLET | Refills: 3 | Status: ON HOLD | OUTPATIENT
Start: 2023-01-01 | End: 2023-01-01

## 2023-01-01 RX ORDER — MORPHINE SULFATE 4 MG/ML
2 INJECTION INTRAVENOUS
Status: DISCONTINUED | OUTPATIENT
Start: 2023-01-01 | End: 2023-01-01 | Stop reason: HOSPADM

## 2023-01-01 RX ORDER — LABETALOL HYDROCHLORIDE 5 MG/ML
INJECTION, SOLUTION INTRAVENOUS PRN
Status: DISCONTINUED | OUTPATIENT
Start: 2023-01-01 | End: 2023-01-01 | Stop reason: SURG

## 2023-01-01 RX ORDER — OXYCODONE HCL 5 MG/5 ML
10 SOLUTION, ORAL ORAL
Status: DISCONTINUED | OUTPATIENT
Start: 2023-01-01 | End: 2023-01-01 | Stop reason: HOSPADM

## 2023-01-01 RX ORDER — DICYCLOMINE HCL 20 MG
10 TABLET ORAL
Status: DISCONTINUED | OUTPATIENT
Start: 2023-01-01 | End: 2023-01-01 | Stop reason: HOSPADM

## 2023-01-01 RX ORDER — DAPAGLIFLOZIN 10 MG/1
5 TABLET, FILM COATED ORAL DAILY
Status: DISCONTINUED | OUTPATIENT
Start: 2023-01-01 | End: 2023-01-01

## 2023-01-01 RX ORDER — LIDOCAINE HYDROCHLORIDE 20 MG/ML
INJECTION, SOLUTION INFILTRATION; PERINEURAL
Status: COMPLETED
Start: 2023-01-01 | End: 2023-01-01

## 2023-01-01 RX ORDER — TAMSULOSIN HYDROCHLORIDE 0.4 MG/1
0.4 CAPSULE ORAL DAILY
Qty: 90 CAPSULE | Refills: 1 | Status: SHIPPED | OUTPATIENT
Start: 2023-01-01 | End: 2024-01-01 | Stop reason: SDUPTHER

## 2023-01-01 RX ADMIN — ONDANSETRON 4 MG: 4 TABLET, ORALLY DISINTEGRATING ORAL at 01:49

## 2023-01-01 RX ADMIN — APIXABAN 5 MG: 5 TABLET, FILM COATED ORAL at 06:31

## 2023-01-01 RX ADMIN — MORPHINE SULFATE 4 MG: 4 INJECTION INTRAVENOUS at 18:39

## 2023-01-01 RX ADMIN — ONDANSETRON 4 MG: 2 INJECTION INTRAMUSCULAR; INTRAVENOUS at 22:24

## 2023-01-01 RX ADMIN — APIXABAN 5 MG: 5 TABLET, FILM COATED ORAL at 23:04

## 2023-01-01 RX ADMIN — CEFAZOLIN 1000 MG: 330 INJECTION, POWDER, FOR SOLUTION INTRAMUSCULAR; INTRAVENOUS at 14:50

## 2023-01-01 RX ADMIN — PROPOFOL 100 MG: 10 INJECTION, EMULSION INTRAVENOUS at 15:47

## 2023-01-01 RX ADMIN — ONDANSETRON 4 MG: 2 INJECTION INTRAMUSCULAR; INTRAVENOUS at 15:17

## 2023-01-01 RX ADMIN — ONDANSETRON 4 MG: 4 TABLET, ORALLY DISINTEGRATING ORAL at 09:40

## 2023-01-01 RX ADMIN — PROPOFOL 150 MG: 10 INJECTION, EMULSION INTRAVENOUS at 14:51

## 2023-01-01 RX ADMIN — LABETALOL HYDROCHLORIDE 15 MG: 5 INJECTION, SOLUTION INTRAVENOUS at 15:52

## 2023-01-01 RX ADMIN — IOHEXOL 75 ML: 350 INJECTION, SOLUTION INTRAVENOUS at 20:08

## 2023-01-01 RX ADMIN — INSULIN HUMAN 4 UNITS: 100 INJECTION, SOLUTION PARENTERAL at 11:24

## 2023-01-01 RX ADMIN — DEXAMETHASONE SODIUM PHOSPHATE 4 MG: 4 INJECTION INTRA-ARTICULAR; INTRALESIONAL; INTRAMUSCULAR; INTRAVENOUS; SOFT TISSUE at 15:17

## 2023-01-01 RX ADMIN — PROPOFOL 100 MG: 10 INJECTION, EMULSION INTRAVENOUS at 15:50

## 2023-01-01 RX ADMIN — HYDROXYUREA 500 MG: 500 CAPSULE ORAL at 01:48

## 2023-01-01 RX ADMIN — ONDANSETRON 4 MG: 2 INJECTION INTRAMUSCULAR; INTRAVENOUS at 18:38

## 2023-01-01 RX ADMIN — ONDANSETRON 4 MG: 2 INJECTION INTRAMUSCULAR; INTRAVENOUS at 16:47

## 2023-01-01 RX ADMIN — SODIUM CHLORIDE, POTASSIUM CHLORIDE, SODIUM LACTATE AND CALCIUM CHLORIDE: 600; 310; 30; 20 INJECTION, SOLUTION INTRAVENOUS at 22:51

## 2023-01-01 RX ADMIN — DEXTROSE MONOHYDRATE 25 G: 100 INJECTION, SOLUTION INTRAVENOUS at 11:23

## 2023-01-01 RX ADMIN — FINASTERIDE 5 MG: 5 TABLET, FILM COATED ORAL at 23:05

## 2023-01-01 RX ADMIN — BUPIVACAINE HYDROCHLORIDE: 5 INJECTION, SOLUTION EPIDURAL; INTRACAUDAL; PERINEURAL at 14:50

## 2023-01-01 RX ADMIN — SODIUM CHLORIDE 1000 ML: 9 INJECTION, SOLUTION INTRAVENOUS at 18:37

## 2023-01-01 RX ADMIN — HUMAN ALBUMIN MICROSPHERES AND PERFLUTREN 3 ML: 10; .22 INJECTION, SOLUTION INTRAVENOUS at 08:46

## 2023-01-01 RX ADMIN — FUROSEMIDE 40 MG: 10 INJECTION, SOLUTION INTRAMUSCULAR; INTRAVENOUS at 20:50

## 2023-01-01 RX ADMIN — PROPOFOL 50 MG: 10 INJECTION, EMULSION INTRAVENOUS at 15:01

## 2023-01-01 RX ADMIN — ROCURONIUM BROMIDE 80 MG: 10 INJECTION, SOLUTION INTRAVENOUS at 14:51

## 2023-01-01 RX ADMIN — SUGAMMADEX 200 MG: 100 INJECTION, SOLUTION INTRAVENOUS at 16:06

## 2023-01-01 RX ADMIN — LIDOCAINE HYDROCHLORIDE 4 ML: 40 SOLUTION TOPICAL at 14:52

## 2023-01-01 RX ADMIN — NITROGLYCERIN 0.4 MG: 0.4 TABLET, ORALLY DISINTEGRATING SUBLINGUAL at 23:27

## 2023-01-01 RX ADMIN — SODIUM CHLORIDE, POTASSIUM CHLORIDE, SODIUM LACTATE AND CALCIUM CHLORIDE: 600; 310; 30; 20 INJECTION, SOLUTION INTRAVENOUS at 11:00

## 2023-01-01 RX ADMIN — TAMSULOSIN HYDROCHLORIDE 0.4 MG: 0.4 CAPSULE ORAL at 06:32

## 2023-01-01 RX ADMIN — INSULIN HUMAN 6 UNITS: 100 INJECTION, SOLUTION PARENTERAL at 23:13

## 2023-01-01 RX ADMIN — SODIUM CHLORIDE, POTASSIUM CHLORIDE, SODIUM LACTATE AND CALCIUM CHLORIDE 1000 ML: 600; 310; 30; 20 INJECTION, SOLUTION INTRAVENOUS at 09:27

## 2023-01-01 RX ADMIN — CEFAZOLIN 2 G: 1 INJECTION, POWDER, FOR SOLUTION INTRAMUSCULAR; INTRAVENOUS at 14:59

## 2023-01-01 RX ADMIN — ONDANSETRON 4 MG: 4 TABLET, ORALLY DISINTEGRATING ORAL at 14:15

## 2023-01-01 RX ADMIN — MORPHINE SULFATE 2 MG: 4 INJECTION INTRAVENOUS at 00:15

## 2023-01-01 RX ADMIN — SODIUM CHLORIDE, POTASSIUM CHLORIDE, SODIUM LACTATE AND CALCIUM CHLORIDE: 600; 310; 30; 20 INJECTION, SOLUTION INTRAVENOUS at 12:28

## 2023-01-01 RX ADMIN — LIDOCAINE HYDROCHLORIDE: 20 INJECTION, SOLUTION INFILTRATION; PERINEURAL at 14:50

## 2023-01-01 RX ADMIN — LISINOPRIL 10 MG: 5 TABLET ORAL at 06:32

## 2023-01-01 RX ADMIN — HALOPERIDOL LACTATE 1 MG: 5 INJECTION, SOLUTION INTRAMUSCULAR at 17:04

## 2023-01-01 ASSESSMENT — LIFESTYLE VARIABLES
HAVE YOU EVER FELT YOU SHOULD CUT DOWN ON YOUR DRINKING: NO
HOW MANY TIMES IN THE PAST YEAR HAVE YOU HAD 5 OR MORE DRINKS IN A DAY: 0
TOTAL SCORE: 0
HAVE PEOPLE ANNOYED YOU BY CRITICIZING YOUR DRINKING: NO
ON A TYPICAL DAY WHEN YOU DRINK ALCOHOL HOW MANY DRINKS DO YOU HAVE: 0
EVER FELT BAD OR GUILTY ABOUT YOUR DRINKING: NO
CONSUMPTION TOTAL: NEGATIVE
TOTAL SCORE: 0
TOTAL SCORE: 0
EVER HAD A DRINK FIRST THING IN THE MORNING TO STEADY YOUR NERVES TO GET RID OF A HANGOVER: NO
ALCOHOL_USE: NO
AVERAGE NUMBER OF DAYS PER WEEK YOU HAVE A DRINK CONTAINING ALCOHOL: 0

## 2023-01-01 ASSESSMENT — PAIN DESCRIPTION - PAIN TYPE
TYPE: ACUTE PAIN
TYPE: SURGICAL PAIN
TYPE: ACUTE PAIN
TYPE: SURGICAL PAIN

## 2023-01-01 ASSESSMENT — FIBROSIS 4 INDEX
FIB4 SCORE: 1.616580753730952141
FIB4 SCORE: 2.64
FIB4 SCORE: 2.28
FIB4 SCORE: 1.27
FIB4 SCORE: 2.04
FIB4 SCORE: 1.34

## 2023-01-01 ASSESSMENT — CHA2DS2 SCORE
VASCULAR DISEASE: YES
AGE 75 OR GREATER: NO
SEX: MALE
DIABETES: YES
HYPERTENSION: YES
CHA2DS2 VASC SCORE: 6
PRIOR STROKE OR TIA OR THROMBOEMBOLISM: YES
AGE 65 TO 74: YES
CHF OR LEFT VENTRICULAR DYSFUNCTION: NO

## 2023-01-01 ASSESSMENT — COGNITIVE AND FUNCTIONAL STATUS - GENERAL
SUGGESTED CMS G CODE MODIFIER MOBILITY: CH
DAILY ACTIVITIY SCORE: 24
SUGGESTED CMS G CODE MODIFIER DAILY ACTIVITY: CH
MOBILITY SCORE: 24

## 2023-01-01 ASSESSMENT — ENCOUNTER SYMPTOMS
DIARRHEA: 0
CHILLS: 0
NAUSEA: 0
CONSTIPATION: 0
ABDOMINAL PAIN: 0
FEVER: 0
VOMITING: 0
DEPRESSION: 0
SHORTNESS OF BREATH: 1

## 2023-01-11 DIAGNOSIS — I10 ESSENTIAL HYPERTENSION: ICD-10-CM

## 2023-01-11 NOTE — TELEPHONE ENCOUNTER
Is the patient due for a refill? Yes    Was the patient seen the past year? Yes    Date of last office visit: 2/17/22    Does the patient have an upcoming appointment?  No      Provider to refill: BE     Does the patients insurance require a 100 day supply?  No

## 2023-02-06 ENCOUNTER — TELEPHONE (OUTPATIENT)
Dept: CARDIOLOGY | Facility: MEDICAL CENTER | Age: 73
End: 2023-02-06

## 2023-02-06 ENCOUNTER — TELEPHONE (OUTPATIENT)
Dept: NEUROLOGY | Facility: MEDICAL CENTER | Age: 73
End: 2023-02-06
Payer: MEDICARE

## 2023-02-06 NOTE — TELEPHONE ENCOUNTER
Approved fa for Tresiba FlexTouch 100 UNIT/ML Sopn (Insulin Degludec); Good till  12/31/2023; 30 day turn around; through [employer] # [phone]; [place]/ [place]; updated emr/liaison

## 2023-02-06 NOTE — TELEPHONE ENCOUNTER
"ADD    Caller: Dr. Rees    Topic/issue: Doctor called and wanted to speak to MC whenever he has time about this pt regarding \"ICD.\" Not a stat message per provider.    Callback Number: Personal cell in comments    "

## 2023-02-07 NOTE — TELEPHONE ENCOUNTER
Alberto Isaac M.D.  You 56 minutes ago (11:24 AM)     I spoke with MD - pt to reach out to us to discuss scheduling S-ICD thanks

## 2023-02-28 ENCOUNTER — OFFICE VISIT (OUTPATIENT)
Dept: MEDICAL GROUP | Facility: MEDICAL CENTER | Age: 73
End: 2023-02-28
Payer: MEDICARE

## 2023-02-28 VITALS
HEIGHT: 74 IN | RESPIRATION RATE: 17 BRPM | TEMPERATURE: 97.6 F | DIASTOLIC BLOOD PRESSURE: 70 MMHG | SYSTOLIC BLOOD PRESSURE: 120 MMHG | OXYGEN SATURATION: 98 % | BODY MASS INDEX: 22.63 KG/M2 | WEIGHT: 176.37 LBS | HEART RATE: 91 BPM

## 2023-02-28 DIAGNOSIS — L03.012 CELLULITIS OF FINGER OF LEFT HAND: ICD-10-CM

## 2023-02-28 PROCEDURE — 99214 OFFICE O/P EST MOD 30 MIN: CPT | Performed by: STUDENT IN AN ORGANIZED HEALTH CARE EDUCATION/TRAINING PROGRAM

## 2023-02-28 RX ORDER — AMOXICILLIN 875 MG/1
875 TABLET, COATED ORAL 2 TIMES DAILY
Qty: 14 TABLET | Refills: 0 | Status: SHIPPED | OUTPATIENT
Start: 2023-02-28 | End: 2023-03-07 | Stop reason: SDUPTHER

## 2023-02-28 ASSESSMENT — FIBROSIS 4 INDEX: FIB4 SCORE: 2.28

## 2023-02-28 ASSESSMENT — PATIENT HEALTH QUESTIONNAIRE - PHQ9: CLINICAL INTERPRETATION OF PHQ2 SCORE: 0

## 2023-02-28 NOTE — PROGRESS NOTES
"Subjective:     CC: hand cellulitis    HPI:   Francesco presents today with hand cellulitis.  He has a very complicated cardiac history and very poor cardiac reserve.  He has gone some redness and swelling in multiple fingers in the hand, especially in the left ring finger.  There has been discoloration, pain and swelling for about a day.  He reached out to [facility] who manages his cardiac care and a stated that he needed to come be seen immediately due to concern for poor wound healing and loss of finger if he has uncontrolled cellulitis.      ROS:  ROS    Objective:     Exam:  /70 (BP Location: Left arm, Patient Position: Sitting, BP Cuff Size: Adult)   Pulse 91   Temp 36.4 °C (97.6 °F) (Temporal)   Resp 17   Ht 1.88 m (6' 2\")   Wt 80 kg (176 lb 5.9 oz)   SpO2 98%   BMI 22.64 kg/m²  Body mass index is 22.64 kg/m².    Physical Exam  Vitals reviewed.   Constitutional:       General: He is not in acute distress.     Appearance: He is not toxic-appearing.   HENT:      Head: Normocephalic and atraumatic.      Right Ear: External ear normal.      Left Ear: External ear normal.   Eyes:      General:         Right eye: No discharge.         Left eye: No discharge.      Extraocular Movements: Extraocular movements intact.      Conjunctiva/sclera: Conjunctivae normal.   Pulmonary:      Effort: Pulmonary effort is normal. No respiratory distress.   Skin:     General: Skin is warm and dry.      Comments: Head/purple discoloration of the tip of the left ring finger with edema, redness and edema of several other fingertips, right hand has several white fingers that patient states are always white   Neurological:      Mental Status: He is alert.   Psychiatric:         Mood and Affect: Mood normal.         Behavior: Behavior normal.         Thought Content: Thought content normal.         Judgment: Judgment normal.           Assessment & Plan:     72 y.o. male with the following -     1. Cellulitis of finger of left " hand  Antibiotics sent to pharmacy, advised patient to reach out if symptoms or not improving on antibiotics due to his risk for progression to worsening wounds  - amoxicillin (AMOXIL) 875 MG tablet; Take 1 Tablet by mouth 2 times a day for 7 days.  Dispense: 14 Tablet; Refill: 0      HCC Gap Form    Diagnosis to address: E11.65, Z79.4 - Type 2 diabetes mellitus with hyperglycemia, with long-term current use of insulin (HCC)  Assessment and plan: Chronic, stable, as based on today's assessment and impact on other conditions evaluated today. Continue with current treatment plan: farxiga, insulin Follow-up with specialist as directed, but at least annually.  Diagnosis: Z79.4 - Encounter for long-term (current) use of insulin ([facility])  Assessment and plan: Chronic, stable, as based on today's assessment and impact on other conditions evaluated today. Continue with current treatment plan: farxiga, insulin Follow-up with specialist as directed, but at least annually.  Diagnosis: D68.59 - Hypercoagulable state (HCC)  Assessment and plan: Chronic, stable. Continue with current defined treatment plan: eliquis. Follow-up at least annually.  Last edited 02/28/23 15:15 PST by Charla Grissom M.D.           No follow-ups on file.    Please note that this dictation was created using voice recognition software. I have made every reasonable attempt to correct obvious errors, but I expect that there are errors of grammar and possibly content that I did not discover before finalizing the note.

## 2023-03-07 ENCOUNTER — OFFICE VISIT (OUTPATIENT)
Dept: MEDICAL GROUP | Facility: MEDICAL CENTER | Age: 73
End: 2023-03-07
Payer: MEDICARE

## 2023-03-07 VITALS
BODY MASS INDEX: 22.63 KG/M2 | RESPIRATION RATE: 17 BRPM | WEIGHT: 176.37 LBS | SYSTOLIC BLOOD PRESSURE: 124 MMHG | DIASTOLIC BLOOD PRESSURE: 74 MMHG | OXYGEN SATURATION: 98 % | TEMPERATURE: 97.8 F | HEART RATE: 81 BPM | HEIGHT: 74 IN

## 2023-03-07 DIAGNOSIS — L03.012 CELLULITIS OF FINGER OF LEFT HAND: ICD-10-CM

## 2023-03-07 PROCEDURE — 99214 OFFICE O/P EST MOD 30 MIN: CPT | Performed by: STUDENT IN AN ORGANIZED HEALTH CARE EDUCATION/TRAINING PROGRAM

## 2023-03-07 RX ORDER — AMOXICILLIN 875 MG/1
875 TABLET, COATED ORAL 2 TIMES DAILY
Qty: 14 TABLET | Refills: 0 | Status: SHIPPED | OUTPATIENT
Start: 2023-03-07 | End: 2023-03-14

## 2023-03-07 ASSESSMENT — FIBROSIS 4 INDEX: FIB4 SCORE: 2.28

## 2023-03-07 NOTE — PROGRESS NOTES
"Subjective:     CC: Follow-up of left finger cellulitis    HPI:   Francesco presents today with follow-up of left finger cellulitis.  He has been taking amoxicillin for almost a week now.  He has noticed improvement in pain and discoloration of the finger, but still notes drainage and some discomfort.  He does have significant vascular compromise secondary to his heart issues.    ROS:  ROS    Objective:     Exam:  /74 (BP Location: Left arm, Patient Position: Sitting, BP Cuff Size: Adult)   Pulse 81   Temp 36.6 °C (97.8 °F) (Temporal)   Resp 17   Ht 1.88 m (6' 2\")   Wt 80 kg (176 lb 5.9 oz)   SpO2 98%   BMI 22.64 kg/m²  Body mass index is 22.64 kg/m².    Physical Exam  Vitals reviewed.   Constitutional:       General: He is not in acute distress.     Appearance: He is not toxic-appearing.   HENT:      Head: Normocephalic and atraumatic.      Right Ear: External ear normal.      Left Ear: External ear normal.   Eyes:      General:         Right eye: No discharge.         Left eye: No discharge.      Extraocular Movements: Extraocular movements intact.      Conjunctiva/sclera: Conjunctivae normal.   Pulmonary:      Effort: Pulmonary effort is normal. No respiratory distress.   Skin:     General: Skin is warm and dry.      Comments: Pink/Purple discoloration of the tip of the right ring finger and some swelling, less than at last visit   Neurological:      Mental Status: He is alert.   Psychiatric:         Mood and Affect: Mood normal.         Behavior: Behavior normal.         Thought Content: Thought content normal.         Judgment: Judgment normal.       Assessment & Plan:     72 y.o. male with the following -     1. Cellulitis of finger of left hand  Due to his vascular issues there is definitely some difficulty getting systemic medication to his finger.  It has improved with amoxicillin but there is still definitely room for improvement.  He is continuing to have drainage.  Extend amoxicillin course " for another week.  Encouraged warm compresses to assist with drainage.  Mupirocin ointment given for after using the warm compresses.  Follow-up in 1 week.  - amoxicillin (AMOXIL) 875 MG tablet; Take 1 Tablet by mouth 2 times a day for 7 days.  Dispense: 14 Tablet; Refill: 0  - mupirocin (BACTROBAN) 2 % Ointment; Apply 1 Application topically 2 times a day.  Dispense: 22 g; Refill: 0      formerly [facility] Gap Form    Diagnosis to address: M32.9 - SLE (systemic lupus erythematosus related syndrome) (HCC)  Assessment and plan: Chronic, stable. Continue with current defined treatment plan: not on any medication. Follow-up at least annually.  Diagnosis: I50.22 - Chronic systolic heart failure (HCC)  Assessment and plan: Chronic, stable, as based on today's assessment and impact on other conditions evaluated today. Continue with current treatment plan: farxiga, eplerenone. Follow-up with specialist as directed, but at least annually.  Diagnosis: I48.0 - Paroxysmal A-fib (formerly [facility])  Assessment and plan: Chronic, stable. Continue with current defined treatment plan: continue eliquis, followed by cardiology. Follow-up at least annually.  Last edited 03/07/23 07:43 PST by Charla Grissom M.D.         No follow-ups on file.    Please note that this dictation was created using voice recognition software. I have made every reasonable attempt to correct obvious errors, but I expect that there are errors of grammar and possibly content that I did not discover before finalizing the note.

## 2023-03-14 ENCOUNTER — OFFICE VISIT (OUTPATIENT)
Dept: MEDICAL GROUP | Facility: MEDICAL CENTER | Age: 73
End: 2023-03-14
Payer: MEDICARE

## 2023-03-14 VITALS
SYSTOLIC BLOOD PRESSURE: 120 MMHG | HEIGHT: 74 IN | WEIGHT: 178 LBS | DIASTOLIC BLOOD PRESSURE: 78 MMHG | HEART RATE: 84 BPM | BODY MASS INDEX: 22.84 KG/M2 | OXYGEN SATURATION: 98 % | RESPIRATION RATE: 17 BRPM | TEMPERATURE: 96.9 F

## 2023-03-14 DIAGNOSIS — L03.012 CELLULITIS OF FINGER OF LEFT HAND: ICD-10-CM

## 2023-03-14 DIAGNOSIS — R10.32 LEFT LOWER QUADRANT ABDOMINAL PAIN: ICD-10-CM

## 2023-03-14 DIAGNOSIS — Z23 IMMUNIZATION DUE: ICD-10-CM

## 2023-03-14 PROCEDURE — 99214 OFFICE O/P EST MOD 30 MIN: CPT | Performed by: STUDENT IN AN ORGANIZED HEALTH CARE EDUCATION/TRAINING PROGRAM

## 2023-03-14 RX ORDER — DICYCLOMINE HYDROCHLORIDE 10 MG/1
10 CAPSULE ORAL EVERY 6 HOURS PRN
Qty: 360 CAPSULE | Refills: 3 | Status: SHIPPED | OUTPATIENT
Start: 2023-03-14 | End: 2023-01-01

## 2023-03-14 RX ORDER — ZOSTER VACCINE RECOMBINANT, ADJUVANTED 50 MCG/0.5
0.5 KIT INTRAMUSCULAR ONCE
Qty: 1 EACH | Refills: 0 | Status: SHIPPED | OUTPATIENT
Start: 2023-03-14 | End: 2023-03-14

## 2023-03-14 ASSESSMENT — FIBROSIS 4 INDEX: FIB4 SCORE: 2.28

## 2023-03-14 NOTE — PROGRESS NOTES
"Subjective:     CC: Cellulitis, abdominal pain    HPI:   Francesco presents today to follow-up on left finger cellulitis.  He has now taken almost 2 weeks of antibiotics with resolution of pain, swelling, redness and drainage of the left finger.  He did need a prolonged course due to vascular issues and heart disease.  He has 2 more days of the antibiotics left.  He still has some purple discoloration around the nail, but the redness, tenderness and swelling has resolved.  Previously he had tracts of pain going down the finger which is also resolved.    He also has a history of abdominal pain.  GI was previously prescribing Bentyl to be taken up to 4 times a day.  He has significant improvement in his pain on the left side with the Bentyl.  His GI doctor has since retired and he is unable to get refills through them.    ROS:  ROS    Objective:     Exam:  /78 (BP Location: Left arm, Patient Position: Sitting, BP Cuff Size: Adult)   Pulse 84   Temp 36.1 °C (96.9 °F) (Temporal)   Resp 17   Ht 1.88 m (6' 2\")   Wt 80.7 kg (178 lb)   SpO2 98%   BMI 22.85 kg/m²  Body mass index is 22.85 kg/m².    Physical Exam  Constitutional:       General: He is not in acute distress.     Appearance: He is not toxic-appearing.   HENT:      Head: Normocephalic and atraumatic.      Right Ear: External ear normal.      Left Ear: External ear normal.   Eyes:      General:         Right eye: No discharge.         Left eye: No discharge.      Extraocular Movements: Extraocular movements intact.      Conjunctiva/sclera: Conjunctivae normal.   Pulmonary:      Effort: Pulmonary effort is normal. No respiratory distress.   Skin:     General: Skin is warm and dry.      Comments: Left ring finger is without swelling, tenderness, warmth or drainage.  There is some purple discolored around the nail   Neurological:      Mental Status: He is alert.   Psychiatric:         Mood and Affect: Mood normal.         Behavior: Behavior normal.        "  Thought Content: Thought content normal.         Judgment: Judgment normal.         Assessment & Plan:     72 y.o. male with the following -     1. Cellulitis of finger of left hand  Complete the last 2 doses of antibiotics, it appears that cellulitis has resolved.  We did discuss that sometimes the purplish discoloration can be there for quite a while after you have cellulitis secondary to skin changes.  There are no signs of active infection at this time.    2. Left lower quadrant abdominal pain  Refill of Bentyl given as this is helped with his abdominal pain in the past  - dicyclomine (BENTYL) 10 MG Cap; Take 1 Capsule by mouth every 6 hours as needed (Abdominal cramps).  Dispense: 360 Capsule; Refill: 3    3. Immunization due  - Zoster Vac Recomb Adjuvanted (SHINGRIX) 50 MCG/0.5ML Recon Susp; Inject 0.5 mL into the shoulder, thigh, or buttocks one time for 1 dose.  Dispense: 1 Each; Refill: 0      HCC Gap Form    Diagnosis to address: D84.9 - Immunodeficiency, unspecified (HCC)  Assessment and plan: Chronic, stable. Continue with current defined treatment plan: monitor. Follow-up at least annually.  Diagnosis: I20.8 - Other forms of angina pectoris (HCC)  Assessment and plan: Chronic, stable, as based on today's assessment and impact on other conditions evaluated today. Continue with current treatment plan: per cardiology. Follow-up with specialist as directed, but at least annually.  Last edited 03/14/23 09:03 PDT by Charla Grissom M.D.         No follow-ups on file.    Please note that this dictation was created using voice recognition software. I have made every reasonable attempt to correct obvious errors, but I expect that there are errors of grammar and possibly content that I did not discover before finalizing the note.

## 2023-03-23 NOTE — TELEPHONE ENCOUNTER
"Good morning,    This PT came in regarding wanting a procedure to be scheduled.    He is a PP of DR BLACKBURN who has performed surgeries on him prior. He stated that he had previously received a pacer that \"burnt up\" and caused major damage to his heart. Per PT [place] is requesting a procedure be completed by us.    PT states that [place] confirmed that we received a request for a procedure to be completed urgently. I looked through PT's media and have not seen any faxed over requests or orders which I made him aware of. PT was adamant that it was sent over.    Please follow up with PT on how to properly proceed with getting DR BLACKBURN to potentially complete the desired procedure.    Thank you for your help!    Sera"

## 2023-03-23 NOTE — TELEPHONE ENCOUNTER
Alberto Isaac M.D.  You 2 hours ago (12:13 PM)     He needs to be screened for SICD first, please have him come in for a visit to make sure he screens in before we schedule, thanks    Called pt scheduled for Monday 4/3

## 2023-03-28 NOTE — TELEPHONE ENCOUNTER
Litzy, Can you re-fax this Rx to [facility]? If you can't then I'll take care of it tomorrow. Thank you!

## 2023-03-28 NOTE — TELEPHONE ENCOUNTER
BE    Caller: Lore ([facility])      Topic/issue: [employer] called in regards to having us re-fax over the script for apixaban (ELIQUIS) 5mg Tab    so they can update the information on there end please advise.     Fax number: [phone]  Callback Number: [phone]     Thank you,    [place] [employer]

## 2023-03-29 NOTE — TELEPHONE ENCOUNTER
Spoke with [employer] Patient approved on March 9th. Called [facility] to give a verbal order of Eliquis Rx. [phone]

## 2023-04-03 NOTE — PROGRESS NOTES
Arrhythmia Clinic Note (Established patient)    DOS: 4/3/2023    Chief complaint/Reason for consult: Ischemic cardiomyopathy    Interval History: 71 y/o M with CAD and ICM EF <25%. Prior ICD in situ, developed MSSA bacteremia and had ICD removed. Sees [facility] now for HF management and S-ICD implant recommended. Pt amenable in clinic today without complaints otherwise.    ROS (+ highlighted in bold):  Constitutional: Fevers/chills/fatigue/weightloss  HEENT: Blurry vision/eye pain/sore throat/hearing loss  Respiratory: Shortness of breath/cough  Cardiovascular: Chest pain/palpitations/edema/orthopnea/syncope  GI: Nausea/vomitting/diarrhea  MSK: Arthralgias/myagias/muscle weakness  Skin: Rash/sores  Neurological: Numbness/tremors/vertigo  Endocrine: Excessive thirst/polyuria/cold intolerance/heat intolerance  Psych: Depression/anxiety    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    Agent orange exposure     Anesthesia     resistant to pain meds    Cancer (HCC)     polycythemia vera    Diabetes (HCC)     insulin and oral meds    Family history of punctured lung 2002    left lung    Heart attack (HCC) 03/2017    Hemorrhagic disorder (HCC)     on blood thinners    High cholesterol     Ischemic cardiomyopathy     Kidney stones     hx of kidney stones    Lupus (HCC) 11/15/2019    Orthostatic hypotension 3/29/2018    Pain     headache pain    Polycythemia vera(238.4)     Stroke ([facility]) 2016    r/t afib; eye issues resolved afterward    Urinary bladder disorder     enlarged prostate, weak stream, difficulty urinating    Vertigo        Past Surgical History:   Procedure Laterality Date    PAMELA N/A 10/30/2021    Procedure: ECHOCARDIOGRAM, TRANSESOPHAGEAL;  Surgeon: Beau Gutierrez M.D.;  Location: SURGERY [facility];  Service: Cardiac    AICD IMPLANT  07/29/2021    [employer] [id number] implanted by Dr. Isaac.    SPLIT THICKNESS SKIN GRAFT N/A 8/23/2020    Procedure: APPLICATION, GRAFT,  SKIN, SPLIT-THICKNESS;  Surgeon: Elisa Craig M.D.;  Location: SURGERY [facility];  Service: Plastics    SKIN ABSCESS INCISION AND DRAINAGE Right 8/3/2020    Procedure: INCISION AND DRAINAGE - SCROTAL WOUND - WOUND VAC PLACEMENT;  Surgeon: Jaylen Hayes M.D.;  Location: [facility];  Service: Urology    MN EXPLORE SCROTUM Right 7/31/2020    Procedure: EXPLORATION, SCROTUM - FOR WASH OUT OF SCROTAL WOUND & WOUND VAC PLACEMENT;  Surgeon: Ferny Rosales M.D.;  Location: [facility];  Service: Urology    MN EXPLORE SCROTUM Right 7/29/2020    Procedure: EXPLORATION, SCROTUM - FOR I & D OF SCROTAL AND  GROIN WOUND;  Surgeon: Donta Viera M.D.;  Location: [facility];  Service: Urology    MN INCIS/DRAIN SCROTUM/TESTIS,EPIDIDYM Right 7/25/2020    Procedure: INCISION AND DRAINAGE, SCROTUM;  Surgeon: Nick Negro M.D.;  Location: [facility];  Service: Urology    TEMPORAL ARTERY BIOPSY Right 6/26/2018    Procedure: TEMPORAL ARTERY BIOPSY;  Surgeon: Rajendra Aguilar M.D.;  Location: SURGERY [facility];  Service: General    CAROTID ENDARTERECTOMY Right 3/21/2018    Procedure: CAROTID ENDARTERECTOMY- W/EEG MONITORING;  Surgeon: Benny Morfin M.D.;  Location: [facility];  Service: General    APPENDECTOMY      CATH PLACEMENT      right arm    LAMINOTOMY      diskectomy; C4    OTHER CARDIAC SURGERY      stents x 3       Social History     Socioeconomic History    Marital status:      Spouse name: Not on file    Number of children: Not on file    Years of education: Not on file    Highest education level: Not on file   Occupational History    Not on file   Tobacco Use    Smoking status: Never    Smokeless tobacco: Never   Vaping Use    Vaping Use: Never used   Substance and Sexual Activity    Alcohol use: No    Drug use: No    Sexual activity: Not on file   Other Topics Concern    Not on file   Social History Narrative  "   Not on file     Social Determinants of Health     Financial Resource Strain: Low Risk     Difficulty of Paying Living Expenses: Not hard at all   Food Insecurity: No Food Insecurity    Worried About Running Out of Food in the Last Year: Never true    Ran Out of Food in the Last Year: Never true   Transportation Needs: No Transportation Needs    Lack of Transportation (Medical): No    Lack of Transportation (Non-Medical): No   Physical Activity: Not on file   Stress: Not on file   Social Connections: Not on file   Intimate Partner Violence: Not on file   Housing Stability: Not on file       Family History   Problem Relation Age of Onset    Ovarian Cancer Neg Hx     Diabetes Neg Hx        Allergies   Allergen Reactions    Doxycycline      Swelling lips and difficulties swallowing    Atorvastatin      Pt and pts wife are not sure what happens     Beta Adrenergic Blockers Unspecified     dizziness    Eplerenone Unspecified     Intolerance, dehydration, altered mental status    Metformin Unspecified and Palpitations     Cardiac effects  \"Similar to a heart attack, I was hospitalized\"    Simvastatin      Other reaction(s): Other (Specify with Comments)  Myalgias  Myalgias    Spironolactone Palpitations    Statins [Hmg-Coa-R Inhibitors]      Muscle ache, joint pain       Current Outpatient Medications   Medication Sig Dispense Refill    apixaban (ELIQUIS) 5mg Tab Take 1 Tablet by mouth 2 times a day. 180 Tablet 0    dicyclomine (BENTYL) 10 MG Cap Take 1 Capsule by mouth every 6 hours as needed (Abdominal cramps). 360 Capsule 3    mupirocin (BACTROBAN) 2 % Ointment Apply 1 Application topically 2 times a day. 22 g 0    Insulin Degludec (TRESIBA FLEXTOUCH) 100 UNIT/ML Solution Pen-injector Inject 10 Units under the skin at bedtime. 15 mL 6    Insulin Pen Needle 32 G x 4 mm (BD PEN NEEDLE VARSHA U/F) 1 Applicator 3 times a day. 100 Each 11    dapagliflozin propanediol (FARXIGA) 10 MG Tab Take 0.5 Tablets by mouth every day. " "45 Tablet 1    eplerenone (INSPRA) 25 MG Tab       tamsulosin (FLOMAX) 0.4 MG capsule Take 1 Capsule by mouth every day. 90 Capsule 1    finasteride (PROSCAR) 5 MG Tab Take 1 Tablet by mouth every day. 90 Tablet 1    nitroglycerin (NITROSTAT) 0.4 MG SL Tab Place 1 Tablet under the tongue as needed for Chest Pain (or hypertension >180/110). 25 Tablet 3    hydroxyurea (HYDREA) 500 MG Cap Take 500 mg by mouth 2 Times a Day.       No current facility-administered medications for this visit.       Physical Exam:  Vitals:    04/03/23 1331   BP: 126/84   BP Location: Left arm   Patient Position: Sitting   BP Cuff Size: Adult   Pulse: (!) 101   Resp: 14   SpO2: 99%   Weight: 81.6 kg (180 lb)   Height: 1.88 m (6' 2\")     General appearance: NAD, conversant   Eyes: anicteric sclerae, moist conjunctivae; no lid-lag; PERRLA  HENT: Atraumatic; oropharynx clear with moist mucous membranes and no mucosal ulcerations; normal hard and soft palate  Neck: Trachea midline; FROM, supple, no thyromegaly or lymphadenopathy  Lungs: CTA, with normal respiratory effort and no intercostal retractions  CV: RRR, no MRGs, no JVD  Abdomen: Soft, non-tender; no masses or HSM  Extremities: No peripheral edema or extremity lymphadenopathy  Skin: Normal temperature, turgor and texture; no rash, ulcers or subcutaneous nodules  Psych: Appropriate affect, alert and oriented to person, place and time    Data:  Lipids:   Lab Results   Component Value Date/Time    CHOLSTRLTOT 182 03/31/2022 01:59 PM    TRIGLYCERIDE 131 03/31/2022 01:59 PM    HDL 34 (A) 03/31/2022 01:59 PM     (H) 03/31/2022 01:59 PM        BMP:  Lab Results   Component Value Date/Time    SODIUM 134 (L) 10/17/2022 1458    POTASSIUM 4.0 10/17/2022 1458    CHLORIDE 100 10/17/2022 1458    CO2 20 10/17/2022 1458    GLUCOSE 176 (H) 10/17/2022 1458    BUN 25 (H) 10/17/2022 1458    CREATININE 1.08 10/17/2022 1458    CALCIUM 9.9 10/17/2022 1458    ANION 14.0 10/17/2022 1458        TSH:   Lab " Results   Component Value Date/Time    TSHULTRASEN 1.580 06/23/2022 1255        THYROXINE (T4):   No results found for: FREEDIR     CBC:   Lab Results   Component Value Date/Time    WBC 3.2 (L) 08/22/2022 01:14 AM    RBC 3.81 (L) 08/22/2022 01:14 AM    HEMOGLOBIN 14.9 08/22/2022 01:14 AM    HEMATOCRIT 44.0 08/22/2022 01:14 AM    .5 (H) 08/22/2022 01:14 AM    MCH 39.1 (H) 08/22/2022 01:14 AM    MCHC 33.9 08/22/2022 01:14 AM    RDW 66.8 (H) 08/22/2022 01:14 AM    PLATELETCT 134 (L) 08/22/2022 01:14 AM    MPV 11.5 08/22/2022 01:14 AM    NEUTSPOLYS 75.20 (H) 08/18/2022 09:35 PM    LYMPHOCYTES 13.00 (L) 08/18/2022 09:35 PM    MONOCYTES 8.10 08/18/2022 09:35 PM    EOSINOPHILS 2.20 08/18/2022 09:35 PM    BASOPHILS 1.30 08/18/2022 09:35 PM    IMMGRAN 0.20 08/18/2022 09:35 PM    NRBC 0.00 08/18/2022 09:35 PM    NEUTS 3.42 08/18/2022 09:35 PM    LYMPHS 0.59 (L) 08/18/2022 09:35 PM    MONOS 0.37 08/18/2022 09:35 PM    EOS 0.10 08/18/2022 09:35 PM    BASO 0.06 08/18/2022 09:35 PM    IMMGRANAB 0.01 08/18/2022 09:35 PM    NRBCAB 0.00 08/18/2022 09:35 PM        CBC w/o DIFF  Lab Results   Component Value Date/Time    WBC 3.2 (L) 08/22/2022 01:14 AM    RBC 3.81 (L) 08/22/2022 01:14 AM    HEMOGLOBIN 14.9 08/22/2022 01:14 AM    .5 (H) 08/22/2022 01:14 AM    MCH 39.1 (H) 08/22/2022 01:14 AM    MCHC 33.9 08/22/2022 01:14 AM    RDW 66.8 (H) 08/22/2022 01:14 AM    MPV 11.5 08/22/2022 01:14 AM       Prior echo/stress reviewed: EF <25%    EKG interpreted by me:     Impression/Plan:  1. Paroxysmal A-fib (HCC)  EKG      2. Chronic systolic heart failure (HCC)  CL-SUBCUTANEOUS INTERNAL CARDIAC DEFIBRILLATOR (SICD)        pAF  CVA  Chronic systolic heart failure  ICM EF <25%  Prior MSSA bacteremia and ICD extraction    - Will schedule SICD. The risk, benefits, and alternatives to ICD placement were discussed in great detail, specific risks mentioned including bleeding, infection, cardiac perforation with possible tamponade  requiring pericardiocentesis or open heart surgery. The [place] patient decision making tool was used to decide on ICD implantation.  In addition the possibility of lead dislodgment, inappropriate shocks, pneumothorax, hemothorax were discussed. Also mentioned were the possibility of death, stroke, and myocardial infarction. The patient verbalized understanding of these potential complications and wishes to proceed with this procedure.   - Continue Eliquis to prevent CVA. Hold 3 days for SICD.      Alberto Isaac MD  Cardiac Electrophysiology

## 2023-04-03 NOTE — TELEPHONE ENCOUNTER
Phone Number Called: [phone]    Call outcome: Spoke to patient regarding message below.    Message: Patient returned RN's phone call.       Patient was able to locate 3 month supply of Eliquis. No further questions at this time.     Patient stated he would call if he had any further concerns.

## 2023-04-03 NOTE — TELEPHONE ENCOUNTER
Phone Number Called: [phone]    Call outcome:  Spoke to pharmacist    Message: Called to inquire about patient's Eliquis:    Patient in to see  for appointment. Informed Tracie (device team) that he has still not received his Eliquis.     RN called pharmacy who stated medication was Delivered to patient's door on 3/30/23.     RN attempted to reach patient via phone as he completed his appointment. Unable to reach patient at this time. Left VM to call back and ask for [place] RN regarding Eliquis.     Samples of Eliquis sent to [facility] on [address] if patient is unable to locate his medication.     RN awaiting call back.

## 2023-04-04 PROBLEM — I50.23 ACUTE ON CHRONIC SYSTOLIC (CONGESTIVE) HEART FAILURE (HCC): Status: RESOLVED | Noted: 2017-03-13 | Resolved: 2023-01-01

## 2023-04-04 PROBLEM — I50.23 ACUTE ON CHRONIC SYSTOLIC (CONGESTIVE) HEART FAILURE (HCC): Status: ACTIVE | Noted: 2017-03-13

## 2023-04-04 PROBLEM — J96.01 ACUTE HYPOXEMIC RESPIRATORY FAILURE (HCC): Status: ACTIVE | Noted: 2023-01-01

## 2023-04-04 PROBLEM — E86.0 DEHYDRATION: Status: ACTIVE | Noted: 2023-01-01

## 2023-04-04 PROBLEM — K52.9 GASTROENTERITIS: Status: ACTIVE | Noted: 2023-01-01

## 2023-04-04 NOTE — TELEPHONE ENCOUNTER
Patient scheduled for SICD Insert w/anesthesia on 5-8-23 with Dr. Isaac. Patient has been instructed to check in at 12:00 for 2:00 case time. Hold Eliquis 3 days. Hold Insulin am of. Message sent to authorrosanna Paz with [facility] notified.

## 2023-04-04 NOTE — TELEPHONE ENCOUNTER
----- Message from Alberto Isaac M.D. sent at 4/3/2023  2:31 PM PDT -----  Please schedule SICD with general anesthesia, hold Eliquis for 3 days, thanks

## 2023-04-04 NOTE — TELEPHONE ENCOUNTER
Called patient to schedule. He is not able to write down instructions at this time. Per patient's request, I will call him this afternoon to schedule.     Time held on 5-4

## 2023-04-05 NOTE — ASSESSMENT & PLAN NOTE
Resolved by the time interviewed in ER. Perhaps 2/2 atelectasis.  D dimer up. CTPA neg for PE.   IV lasix 40 x 1, re assess in AM  Resolved now off O2 at time of interview, suspect atelectasis

## 2023-04-05 NOTE — PROGRESS NOTES
"Pt reports left-sided 8/10 chest pain that has moved to the right side. Pt vitals stable, RR 24. 1 dose of nitro given per orders. Pt reports no change in pain after 5 minutes. Pt requests the \"injection he received in ER that knocked the pain right out\". Charge RN notified. RN to notify MD. MD notified. Pt reports difficulty breathing. O2 sat in mid 90s. Pt placed on oxy mask.    3553 - Orders for morphine received. RN to medicate pending pharmacy approval.    0005 - Pt reports increased chest pain. RN contacted pharmacy for medication approval. RN placed on hold.     0007 - Pharmacy states they will approve morphine.   "

## 2023-04-05 NOTE — ED TRIAGE NOTES
Chief Complaint   Patient presents with    N/V     Pt arrives with a strong medical history, he ate something 2 days ago and has been  vomiting ever since, he is holding his chest with pain, he has a strong cardiac history. Sats 86% RA, 3 L applied, sats to 93%, roomed by Charge.

## 2023-04-05 NOTE — DISCHARGE SUMMARY
Discharge Summary    CHIEF COMPLAINT ON ADMISSION  Chief Complaint   Patient presents with    N/V     Pt arrives with a strong medical history, he ate something 2 days ago and has been  vomiting ever since, he is holding his chest with pain, he has a strong cardiac history. Sats 86% RA, 3 L applied, sats to 93%, roomed by Charge.       Reason for Admission  N/V/D     Admission Date  4/4/2023    CODE STATUS  Full Code    HPI & HOSPITAL COURSE  Mr Bob Schulte is a 72 year old gentleman who comes in for nausea and vomiting over the past 3-4 days after eating Tacos when he was on his way to the cardiologist.  The patient ended up having gastroenteritis most likely viral in nature which was self-limited however the patient has a very longstanding cardiac history where he is allergic to beta-blockers and in spite of the fact that his left ventricular ejection fraction is 25% he is not able to take beta-blockers because of this.  The patient became so dehydrated that his cardiac status went down and he had minimal output thus he became hypotensive tensive and hypoxic.  Patient was admitted on 86% saturations on room air had to be placed on 3 L by nasal cannula.  Slowly was able to be weaned off and he is now back to 93% on room air.  Repeat echocardiogram does not show any changes in his cardiac status.  The patient's overall condition at this point has improved back to his baseline and at this point the patient is able to discharge home with outpatient follow-ups and monitoring.  He is currently alert awake oriented x3 pleasant cooperative gentleman understands his discharge plan and options.    Therefore, he is discharged in good and stable condition to home with close outpatient follow-up.    The patient recovered much more quickly than anticipated on admission.    Discharge Date  4/5/2023    FOLLOW UP ITEMS POST DISCHARGE  Follow-up with the primary care physician in 2 to 3 days    DISCHARGE DIAGNOSES  Principal  Problem:    Gastroenteritis POA: Unknown  Active Problems:    Benign prostatic hyperplasia with urinary retention POA: Yes      Overview: Prior notes for details, patient has had his Andres discontinued today at       [place] [facility] office.  He tells me he is able to urinate.  He will       continue taking his finasteride and tamsulosin as prescribed.    Dyslipidemia POA: Yes    Paroxysmal A-fib (HCC) (Chronic) POA: Yes    Type 2 diabetes mellitus with hyperglycemia (HCC) POA: Yes      Overview: This is a chronic condition uncontrolled, managed by endocrinology Dr. Mckeon, patient has not seen him for over a year now. His current       regimen includes Tresiba 12 Units daily, also Humalog sliding scale with       meals.  and Farxiga but was on hold due to urinary symptoms.            Lab Results       Component Value Date/Time        HBA1C 8.4 (H) 07/06/2022 10:25 AM              Fasting sugars: 130s, 215-220 postprandial      Last diabetic foot exam: 07/26/2022      Last retinal eye exam: Pending appointment with ophthalmology      ACEi/ARB: contraindicated due to orthostatic hypotension, on Farxyga       Statin: intolerant       Aspirin: no, on Eliquis      Concomitant HTN: no                Coronary artery disease involving native coronary artery without angina pectoris POA: Unknown    Long term (current) use of anticoagulants [Z79.01] POA: Yes    Essential hypertension POA: Yes    Pain in the chest POA: Unknown      Overview: IMO load March 2020    Ischemic cardiomyopathy POA: Yes    Acute hypoxemic respiratory failure (HCC) POA: Unknown    Dehydration POA: Unknown  Resolved Problems:    Acute on chronic systolic (congestive) heart failure (HCC) POA: Unknown      Overview: Most recent ejection fraction 25% on echocardiogram.  He follows with       cardiology at [facility], it appears that they are planning on placing new       AICD.  Patient was also telling, that they would like to try one of the        medications again.  He does not know all the details, he will update me       later.            FOLLOW UP  Future Appointments   Date Time Provider Department Center   5/8/2023  2:00 PM [facility] CATH LAB 3 CLOT None     No follow-up provider specified.    MEDICATIONS ON DISCHARGE     Medication List        START taking these medications        Instructions   ciprofloxacin 500 MG Tabs  Commonly known as: CIPRO   Take 1 Tablet by mouth 2 times a day for 5 days.  Dose: 500 mg     lisinopril 5 MG Tabs  Start taking on: April 6, 2023  Commonly known as: PRINIVIL   Take 1 Tablet by mouth every day.  Dose: 5 mg     metroNIDAZOLE 500 MG Tabs  Commonly known as: FLAGYL   Take 1 Tablet by mouth every 8 hours for 5 days.  Dose: 500 mg     ondansetron 4 MG Tbdp  Commonly known as: ZOFRAN ODT   Take 1 Tablet by mouth every four hours as needed for Nausea/Vomiting (give PO if IV route is unavailable.).  Dose: 4 mg            CHANGE how you take these medications        Instructions   apixaban 5mg Tabs  What changed: Another medication with the same name was removed. Continue taking this medication, and follow the directions you see here.  Commonly known as: ELIQUIS   Doctor's comments: Please call to schedule follow up appointment for further refills. Please call [phone]. Thank you.  Take 1 Tablet by mouth 2 times a day.  Dose: 5 mg            CONTINUE taking these medications        Instructions   BD Pen Needle Racheal U/F  Generic drug: Insulin Pen Needle 32 G x 4 mm   1 Applicator 3 times a day.  Dose: 1 Applicator     dapagliflozin propanediol 10 MG Tabs  Commonly known as: Farxiga   Take 0.5 Tablets by mouth every day.  Dose: 5 mg     dicyclomine 10 MG Caps  Commonly known as: BENTYL   Take 1 Capsule by mouth every 6 hours as needed (Abdominal cramps).  Dose: 10 mg     finasteride 5 MG Tabs  Commonly known as: PROSCAR   Take 1 Tablet by mouth every day.  Dose: 5 mg     hydroxyurea 500 MG Caps  Commonly known as: HYDREA   Take  "500 mg by mouth 2 Times a Day.  Dose: 500 mg     mupirocin 2 % Oint  Commonly known as: BACTROBAN   Apply 1 Application topically 2 times a day.  Dose: 1 Application.     nitroglycerin 0.4 MG Subl  Commonly known as: NITROSTAT   Place 1 Tablet under the tongue as needed for Chest Pain (or hypertension >180/110).  Dose: 0.4 mg     tamsulosin 0.4 MG capsule  Commonly known as: FLOMAX   Take 1 Capsule by mouth every day.  Dose: 0.4 mg     Tresiba FlexTouch 100 UNIT/ML Sopn  Generic drug: Insulin Degludec   Inject 10 Units under the skin at bedtime.  Dose: 10 Units              Allergies  Allergies   Allergen Reactions    Doxycycline      Swelling lips and difficulties swallowing    Atorvastatin      Pt and pts wife are not sure what happens     Beta Adrenergic Blockers Unspecified     dizziness    Eplerenone Unspecified     Intolerance, dehydration, altered mental status    Metformin Unspecified and Palpitations     Cardiac effects  \"Similar to a heart attack, I was hospitalized\"    Simvastatin      Other reaction(s): Other (Specify with Comments)  Myalgias  Myalgias    Spironolactone Palpitations    Statins [Hmg-Coa-R Inhibitors]      Muscle ache, joint pain       DIET  Orders Placed This Encounter   Procedures    Diet Order Diet: 2 Gram Sodium; Second Modifier: (optional): Consistent CHO (Diabetic); Fluid modifications: (optional): 2000 ml Fluid Restriction     Standing Status:   Standing     Number of Occurrences:   1     Order Specific Question:   Diet:     Answer:   2 Gram Sodium [7]     Order Specific Question:   Second Modifier: (optional)     Answer:   Consistent CHO (Diabetic) [4]     Order Specific Question:   Fluid modifications: (optional)     Answer:   2000 ml Fluid Restriction [11]       ACTIVITY  As tolerated.  Weight bearing as tolerated    CONSULTATIONS  None    PROCEDURES  Echocardiogram    LABORATORY  Lab Results   Component Value Date    SODIUM 129 (L) 04/04/2023    POTASSIUM 4.2 04/04/2023    " CHLORIDE 96 04/04/2023    CO2 20 04/04/2023    GLUCOSE 264 (H) 04/04/2023    BUN 15 04/04/2023    CREATININE 0.88 04/04/2023        Lab Results   Component Value Date    WBC 10.3 04/05/2023    HEMOGLOBIN 14.8 04/05/2023    HEMATOCRIT 44.1 04/05/2023    PLATELETCT 200 04/05/2023        Total time of the discharge process exceeds 35 minutes.

## 2023-04-05 NOTE — ASSESSMENT & PLAN NOTE
Atypical, R sided. Trop neg. EKG w/o ischemic changes. Trend trop, repeat TTE last one was in 7/2022

## 2023-04-05 NOTE — H&P
Hospital Medicine History & Physical Note    Date of Service  4/4/2023    Primary Care Physician  Tala Dunne M.D.      Code Status  Full Code    Chief Complaint  Chief Complaint   Patient presents with    N/V     Pt arrives with a strong medical history, he ate something 2 days ago and has been  vomiting ever since, he is holding his chest with pain, he has a strong cardiac history. Sats 86% RA, 3 L applied, sats to 93%, roomed by Charge.       History of Presenting Illness  Francesco Schulte is a 72 y.o. male w/ CAD, ICM EF 10%, HTN, Afib on Eliqus, who presented 4/4/2023 with palpitations and nausea vomiting.  Pt had tacos 2 days ago and since then has had persistent nausea/vomiting and mild diarrhea. He states he felt very weak and started having palpitations and got worried about his heart. Also has chronic pain at the site where ICD was removed but started having some R sided chest pain, mostly near his R shoulder but again got worried and came to the ER. Denies sob. No worsening LAURI. No worsening Zhang, orthopnea or PND. Had abdominal pain that is now resolved as well. He is followed closely at [facility] for his HF.    In the ER, vitals nl, in normal sinus rhythm, 86% on RA but resolved on its own by the time of interview off O2 now. D dimer up, CTPA neg for PE. BNP 3000s. Trop neg. Initially given IVFs followed by IV lasix 40 x 1.    I discussed the plan of care with patient.    Review of Systems  Review of Systems   All other systems reviewed and are negative.    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), Agent orange exposure, Anesthesia, Cancer ([facility]), Diabetes ([facility]), Family history of punctured lung (2002), Heart attack ([facility]) (03/2017), Hemorrhagic disorder ([facility]), High cholesterol, Ischemic cardiomyopathy, Kidney stones, Lupus ([facility]) (11/15/2019), Orthostatic hypotension (3/29/2018), Pain, Polycythemia vera(238.4), Stroke ([facility]) (2016),  "Urinary bladder disorder, and Vertigo.    Surgical History   has a past surgical history that includes other cardiac surgery; cath placement; laminotomy; appendectomy; carotid endarterectomy (Right, 3/21/2018); temporal artery biopsy (Right, 2018); pr incis/drain scrotum/testis,epididym (Right, 2020); pr explore scrotum (Right, 2020); pr explore scrotum (Right, 2020); skin abscess incision and drainage (Right, 8/3/2020); split thickness skin graft (N/A, 2020); aicd implant (2021); and jacqueline (N/A, 10/30/2021).     Family History  family history is not on file.   Family history reviewed with patient. There is no family history that is pertinent to the chief complaint.     Social History   reports that he has never smoked. He has never used smokeless tobacco. He reports that he does not drink alcohol and does not use drugs.    Allergies  Allergies   Allergen Reactions    Doxycycline      Swelling lips and difficulties swallowing    Atorvastatin      Pt and pts wife are not sure what happens     Beta Adrenergic Blockers Unspecified     dizziness    Eplerenone Unspecified     Intolerance, dehydration, altered mental status    Metformin Unspecified and Palpitations     Cardiac effects  \"Similar to a heart attack, I was hospitalized\"    Simvastatin      Other reaction(s): Other (Specify with Comments)  Myalgias  Myalgias    Spironolactone Palpitations    Statins [Hmg-Coa-R Inhibitors]      Muscle ache, joint pain       Medications  Prior to Admission Medications   Prescriptions Last Dose Informant Patient Reported? Taking?   Insulin Degludec (TRESIBA FLEXTOUCH) 100 UNIT/ML Solution Pen-injector   No No   Sig: Inject 10 Units under the skin at bedtime.   Insulin Pen Needle 32 G x 4 mm (BD PEN NEEDLE VARSHA U/F)   No No   Si Applicator 3 times a day.   apixaban (ELIQUIS) 5mg Tab   No No   Sig: Take 1 Tablet by mouth 2 times a day.   apixaban (ELIQUIS) 5mg Tab   No No   Sig: Take 1 Tablet by " mouth 2 times a day for 30 days.   dapagliflozin propanediol (FARXIGA) 10 MG Tab   No No   Sig: Take 0.5 Tablets by mouth every day.   dicyclomine (BENTYL) 10 MG Cap   No No   Sig: Take 1 Capsule by mouth every 6 hours as needed (Abdominal cramps).   eplerenone (INSPRA) 25 MG Tab   Yes No   finasteride (PROSCAR) 5 MG Tab  Patient's Home Pharmacy No No   Sig: Take 1 Tablet by mouth every day.   hydroxyurea (HYDREA) 500 MG Cap  Patient's Home Pharmacy Yes No   Sig: Take 500 mg by mouth 2 Times a Day.   mupirocin (BACTROBAN) 2 % Ointment   No No   Sig: Apply 1 Application topically 2 times a day.   nitroglycerin (NITROSTAT) 0.4 MG SL Tab  Patient's Home Pharmacy No No   Sig: Place 1 Tablet under the tongue as needed for Chest Pain (or hypertension >180/110).   tamsulosin (FLOMAX) 0.4 MG capsule  Patient's Home Pharmacy No No   Sig: Take 1 Capsule by mouth every day.      Facility-Administered Medications: None       Physical Exam  Temp:  [35.8 °C (96.5 °F)] 35.8 °C (96.5 °F)  Pulse:  [75-86] 75  Resp:  [15-18] 16  BP: (132-149)/(74-89) 137/74  SpO2:  [86 %-100 %] 100 %  Blood Pressure : 137/74   Temperature: 35.8 °C (96.5 °F)   Pulse: 75   Respiration: 16   Pulse Oximetry: 100 %       Physical Exam  General: NAD, resting comfortably  HEENT: PERRLA, EOMI  Cards: RRR, no murmur or gallops, no tenderness of chest wall, JVP nl  Pulm: normal respiratory effort, CTAB, no wheezes or rhonchi  Abdomen: soft, NTND, + bowel sounds, no rebound tenderness or guarding  MSK: normal ROM of upper and lower extremities, no LAURI  Neuro: CN II-XII grossly intact, sensation/strength intact, AAOx3  Psych: Appropriate mood     Laboratory:  Recent Labs     04/04/23  1807   WBC 11.4*   RBC 4.20*   HEMOGLOBIN 15.9   HEMATOCRIT 46.7   .2*   MCH 37.9*   MCHC 34.0   RDW 54.6*   PLATELETCT 242   MPV 10.8     Recent Labs     04/04/23  1807   SODIUM 130*   POTASSIUM 4.2   CHLORIDE 96   CO2 21   GLUCOSE 257*   BUN 15   CREATININE 0.87   CALCIUM  9.6     Recent Labs     04/04/23  1807   ALTSGPT 13   ASTSGOT 19   ALKPHOSPHAT 79   TBILIRUBIN 0.8   LIPASE 22   GLUCOSE 257*         Recent Labs     04/04/23  1807   NTPROBNP 3537*         Recent Labs     04/04/23  1943   TROPONINT 16       Imaging:  CT-CTA CHEST PULMONARY ARTERY W/ RECONS   Final Result         1. No CT evidence of pulmonary embolism.      2. Patchy bibasilar atelectasis.      3. Trace pericardial effusion.      4. Severe coronary calcification.      DX-CHEST-PORTABLE (1 VIEW)   Final Result      1.  There is no acute cardiopulmonary process.   2.  Cardiac silhouette is borderline enlarged.          EKG:  I have personally reviewed the images and compared with prior images.    Assessment/Plan:  Justification for Admission Status  I anticipate this patient will require at least two midnights for appropriate medical management, necessitating inpatient admission because Dehydration    Patient will need a Telemetry bed on MEDICAL service .  The need is secondary to dehydration    * Gastroenteritis  Assessment & Plan  Suspect viral gastroenteritis in setting of consuming restaurant.   Resolving. Supportive care.  Given hx of HF, BNP 3000s, hold off on additional fliuds.    Acute hypoxemic respiratory failure (HCC)  Assessment & Plan  Resolved by the time interviewed in ER. Perhaps 2/2 atelectasis.  D dimer up. CTPA neg for PE.   IV lasix 40 x 1, re assess in AM  Resolved now off O2 at time of interview, suspect atelectasis    Ischemic cardiomyopathy- (present on admission)  Assessment & Plan  Recently saw Dr. Dugan (EP) for ICD re-placement. Has hx of ICD removal due to MSSA bacteremia. Despite BNP up in 3000s, clinically not in HF. Also denies orthopnea, PND, weight gain or worsening LAURI.   Given IVFs in ER then IV lasix 40 x 1. Re assess in AM. From history, dehydration more concerning and possible afib flare up.  Unclear why not on goal directed medical therapy such as ace/arb, bb - defer to  [facility] as managed by them in outpatient setting. Pt states that he hasn't tolerated a lot of the meds they had tried.    Pain in the chest  Assessment & Plan  Atypical, R sided. Trop neg. EKG w/o ischemic changes. Trend trop, repeat TTE last one was in 7/2022      Essential hypertension- (present on admission)  Assessment & Plan  Noted, on eplerenone. Hold. IV lasix 40 x 1.    Long term (current) use of anticoagulants [Z79.01]- (present on admission)  Assessment & Plan  Noted. No signs of bleed. Continue eliquis.    Coronary artery disease involving native coronary artery without angina pectoris  Assessment & Plan  Noted, on aspirin. Statin intolerant. Monitor. Followed by outpatient cards here and at [facility].  Trend trop.    Type 2 diabetes mellitus with hyperglycemia (HCC)- (present on admission)  Assessment & Plan  Poorly controlled. Repeat A1c.  On Farxiga, continue.    Paroxysmal A-fib (HCC)- (present on admission)  Assessment & Plan  Currently rate controlled. Hx concerning for afib flare up in midst of gastroenteritis. Afib flare up likely drove the BNP rise detected on labs. Not clinically in HF.  Continue Eliquis.    Dyslipidemia- (present on admission)  Assessment & Plan  Statin intolerant. Defer to outpatient cards.    Benign prostatic hyperplasia with urinary retention- (present on admission)  Assessment & Plan  Stable, continue flomax and proscar        VTE prophylaxis: therapeutic anticoagulation with eliquis    ADDENDUM: Patient states he still feels like he doesn't have appetite and feeling a bit dizzy. Will order IVFs since he does not examine overloaded.

## 2023-04-05 NOTE — PROGRESS NOTES
Pt refusing bed alarm, MD at bedside communicated this to RN who educated pt about moderate fall risk requirements, pt verbalized understanding. Treaded socks on, bed lowered. Strip alarm turned off per pt wish and MD agreement.

## 2023-04-05 NOTE — ASSESSMENT & PLAN NOTE
Currently rate controlled. Hx concerning for afib flare up in midst of gastroenteritis. Afib flare up likely drove the BNP rise detected on labs. Not clinically in HF.  Continue Eliquis.

## 2023-04-05 NOTE — ASSESSMENT & PLAN NOTE
Suspect viral gastroenteritis in setting of consuming restaurant.   Resolving. Supportive care.  Given hx of HF, BNP 3000s, hold off on additional fliuds.

## 2023-04-05 NOTE — ED PROVIDER NOTES
ER Provider Note    Scribed for Leoncio Maldonado D.O. by Jia Grant. 4/4/2023  6:12 PM    Primary Care Provider: Tala Dunne M.D.    CHIEF COMPLAINT  Chief Complaint   Patient presents with    N/V     Pt arrives with a strong medical history, he ate something 2 days ago and has been  vomiting ever since, he is holding his chest with pain, he has a strong cardiac history. Sats 86% RA, 3 L applied, sats to 93%, roomed by Charge.       HPI/ROS  LIMITATION TO HISTORY   None  OUTSIDE HISTORIAN(S):  None    Francesco Schulte is a 72 y.o. male who presents to the Emergency Department for vomiting onset two days ago. The patient reports eating tacos yesterday afternoon and then vomited an hour later. He has been vomiting multiple times all day yesterday and today. His heart felt like it was racing yesterday and also has chest pain. He was found to be at 86% on room air by EMS. He does not use supplemental oxygen at home. He had a cold three weeks ago, but is still experiencing residual coughing. He reports intermittent lightheadedness and falling to his knees. He also experiences diarrhea, fever, chills, and abdominal discomfort. He reports that he had a pacemaker removed and has plans to place a defibrillator soon at [place] due to having a minor stroke. Patient had his pacemaker placed when he was in the .     ROS as per HPI.    PAST MEDICAL HISTORY  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    Agent orange exposure     Anesthesia     resistant to pain meds    Cancer (HCC)     polycythemia vera    Diabetes (HCC)     insulin and oral meds    Family history of punctured lung 2002    left lung    Heart attack (HCC) 03/2017    Hemorrhagic disorder (HCC)     on blood thinners    High cholesterol     Ischemic cardiomyopathy     Kidney stones     hx of kidney stones    Lupus (HCC) 11/15/2019    Orthostatic hypotension 3/29/2018    Pain     headache pain     Polycythemia vera(238.4)     Stroke (HCC) 2016    r/t afib; eye issues resolved afterward    Urinary bladder disorder     enlarged prostate, weak stream, difficulty urinating    Vertigo        SURGICAL HISTORY  Past Surgical History:   Procedure Laterality Date    PAMELA N/A 10/30/2021    Procedure: ECHOCARDIOGRAM, TRANSESOPHAGEAL;  Surgeon: Beau Gutierrez M.D.;  Location: [facility];  Service: Cardiac    AICD IMPLANT  07/29/2021    [employer] D233 implanted by Dr. Isaac.    SPLIT THICKNESS SKIN GRAFT N/A 8/23/2020    Procedure: APPLICATION, GRAFT, SKIN, SPLIT-THICKNESS;  Surgeon: Elisa Craig M.D.;  Location: [facility];  Service: Plastics    SKIN ABSCESS INCISION AND DRAINAGE Right 8/3/2020    Procedure: INCISION AND DRAINAGE - SCROTAL WOUND - WOUND VAC PLACEMENT;  Surgeon: Jaylen Hayes M.D.;  Location: [facility];  Service: Urology    WI EXPLORE SCROTUM Right 7/31/2020    Procedure: EXPLORATION, SCROTUM - FOR WASH OUT OF SCROTAL WOUND & WOUND VAC PLACEMENT;  Surgeon: Ferny Rosales M.D.;  Location: [facility];  Service: Urology    WI EXPLORE SCROTUM Right 7/29/2020    Procedure: EXPLORATION, SCROTUM - FOR I & D OF SCROTAL AND  GROIN WOUND;  Surgeon: Donta Viera M.D.;  Location: [facility];  Service: Urology    WI INCIS/DRAIN SCROTUM/TESTIS,EPIDIDYM Right 7/25/2020    Procedure: INCISION AND DRAINAGE, SCROTUM;  Surgeon: Nick Negro M.D.;  Location: [facility];  Service: Urology    TEMPORAL ARTERY BIOPSY Right 6/26/2018    Procedure: TEMPORAL ARTERY BIOPSY;  Surgeon: Rajendra Aguilar M.D.;  Location: SURGERY SAME DAY [facility];  Service: General    CAROTID ENDARTERECTOMY Right 3/21/2018    Procedure: CAROTID ENDARTERECTOMY- W/EEG MONITORING;  Surgeon: Benny Morfin M.D.;  Location: [facility];  Service: General    APPENDECTOMY      CATH PLACEMENT      right arm    LAMINOTOMY       "diskectomy; C4    OTHER CARDIAC SURGERY      stents x 3       FAMILY HISTORY  Family History   Problem Relation Age of Onset    Ovarian Cancer Neg Hx     Diabetes Neg Hx        SOCIAL HISTORY   reports that he has never smoked. He has never used smokeless tobacco. He reports that he does not drink alcohol and does not use drugs.    CURRENT MEDICATIONS  Previous Medications    APIXABAN (ELIQUIS) 5MG TAB    Take 1 Tablet by mouth 2 times a day.    APIXABAN (ELIQUIS) 5MG TAB    Take 1 Tablet by mouth 2 times a day for 30 days.    DAPAGLIFLOZIN PROPANEDIOL (FARXIGA) 10 MG TAB    Take 0.5 Tablets by mouth every day.    DICYCLOMINE (BENTYL) 10 MG CAP    Take 1 Capsule by mouth every 6 hours as needed (Abdominal cramps).    EPLERENONE (INSPRA) 25 MG TAB        FINASTERIDE (PROSCAR) 5 MG TAB    Take 1 Tablet by mouth every day.    HYDROXYUREA (HYDREA) 500 MG CAP    Take 500 mg by mouth 2 Times a Day.    INSULIN DEGLUDEC (TRESIBA FLEXTOUCH) 100 UNIT/ML SOLUTION PEN-INJECTOR    Inject 10 Units under the skin at bedtime.    INSULIN PEN NEEDLE 32 G X 4 MM (BD PEN NEEDLE VARSHA U/F)    1 Applicator 3 times a day.    MUPIROCIN (BACTROBAN) 2 % OINTMENT    Apply 1 Application topically 2 times a day.    NITROGLYCERIN (NITROSTAT) 0.4 MG SL TAB    Place 1 Tablet under the tongue as needed for Chest Pain (or hypertension >180/110).    TAMSULOSIN (FLOMAX) 0.4 MG CAPSULE    Take 1 Capsule by mouth every day.       ALLERGIES  Doxycycline, Atorvastatin, Beta adrenergic blockers, Eplerenone, Metformin, Simvastatin, Spironolactone, and Statins [hmg-coa-r inhibitors]    PHYSICAL EXAM  BP (!) 149/89   Pulse 86   Temp 35.8 °C (96.5 °F) (Temporal)   Resp 16   Ht 1.88 m (6' 2\")   SpO2 93%   BMI 23.11 kg/m²     General: No acute distress.  HENT: Normocephalic, Mucus membranes are dry.   Chest: Lungs have even and unlabored respirations, Clear to auscultation.   Cardiovascular: Regular rate and regular rhythm, No peripheral cyanosis.  Abdomen: " Non distended.  Neuro: Awake, Conversive, Able to relay recent events.  Psychiatric: Calm and cooperative.     EXTERNAL RECORDS REVIEWED  Review of patient's past medical records show patient has an EF of less than 25%.     INITIAL ASSESSMENT  Patient appears dehydrated and complains of vomiting for two days. He has abdominal pain and chest pain as well. IV fluids will be given slowly due to concerns of EF of less than 25%. Cardiac evaluation will be done. He was hypoxic on arrival and O2 levels improved with supplemental oxygen. He has no history of hypoxemia. Chest x-ray will be done to evaluate for pulmonary embolism and pneumonia. D-Dimer will be ordered to evaluate for pulmonary embolism.     ED Observation Status? No; Patient does not meet criteria for ED Observation.     DIAGNOSTIC STUDIES    Labs:   Results for orders placed or performed during the hospital encounter of 04/04/23   CBC w/ Differential   Result Value Ref Range    WBC 11.4 (H) 4.8 - 10.8 K/uL    RBC 4.20 (L) 4.70 - 6.10 M/uL    Hemoglobin 15.9 14.0 - 18.0 g/dL    Hematocrit 46.7 42.0 - 52.0 %    .2 (H) 81.4 - 97.8 fL    MCH 37.9 (H) 27.0 - 33.0 pg    MCHC 34.0 33.7 - 35.3 g/dL    RDW 54.6 (H) 35.9 - 50.0 fL    Platelet Count 242 164 - 446 K/uL    MPV 10.8 9.0 - 12.9 fL    Neutrophils-Polys 88.40 (H) 44.00 - 72.00 %    Lymphocytes 3.10 (L) 22.00 - 41.00 %    Monocytes 6.70 0.00 - 13.40 %    Eosinophils 0.10 0.00 - 6.90 %    Basophils 0.40 0.00 - 1.80 %    Immature Granulocytes 1.30 (H) 0.00 - 0.90 %    Nucleated RBC 0.00 /100 WBC    Neutrophils (Absolute) 10.08 (H) 1.82 - 7.42 K/uL    Lymphs (Absolute) 0.35 (L) 1.00 - 4.80 K/uL    Monos (Absolute) 0.76 0.00 - 0.85 K/uL    Eos (Absolute) 0.01 0.00 - 0.51 K/uL    Baso (Absolute) 0.04 0.00 - 0.12 K/uL    Immature Granulocytes (abs) 0.15 (H) 0.00 - 0.11 K/uL    NRBC (Absolute) 0.00 K/uL   Complete Metabolic Panel (CMP)   Result Value Ref Range    Sodium 130 (L) 135 - 145 mmol/L    Potassium  4.2 3.6 - 5.5 mmol/L    Chloride 96 96 - 112 mmol/L    Co2 21 20 - 33 mmol/L    Anion Gap 13.0 7.0 - 16.0    Glucose 257 (H) 65 - 99 mg/dL    Bun 15 8 - 22 mg/dL    Creatinine 0.87 0.50 - 1.40 mg/dL    Calcium 9.6 8.4 - 10.2 mg/dL    AST(SGOT) 19 12 - 45 U/L    ALT(SGPT) 13 2 - 50 U/L    Alkaline Phosphatase 79 30 - 99 U/L    Total Bilirubin 0.8 0.1 - 1.5 mg/dL    Albumin 3.5 3.2 - 4.9 g/dL    Total Protein 8.5 (H) 6.0 - 8.2 g/dL    Globulin 5.0 (H) 1.9 - 3.5 g/dL    A-G Ratio 0.7 g/dL   Lipase   Result Value Ref Range    Lipase 22 7 - 58 U/L   D-DIMER   Result Value Ref Range    D-Dimer 0.97 (H) 0.00 - 0.50 ug/mL (FEU)   proBrain Natriuretic Peptide, NT   Result Value Ref Range    NT-proBNP 3537 (H) 0 - 125 pg/mL   CORRECTED CALCIUM   Result Value Ref Range    Correct Calcium 10.0 8.5 - 10.5 mg/dL   ESTIMATED GFR   Result Value Ref Range    GFR (CKD-EPI) 92 >60 mL/min/1.73 m 2   TROPONIN   Result Value Ref Range    Troponin T 16 6 - 19 ng/L   CoV-2, FLU A/B, and RSV by PCR (2-4 Hours CEPHEID) : Collect NP swab in VTM    Specimen: Respirate   Result Value Ref Range    Influenza virus A RNA Negative Negative    Influenza virus B, PCR Negative Negative    RSV, PCR Negative Negative    SARS-CoV-2 by PCR NotDetected     SARS-CoV-2 Source 1    Prothrombin time (INR)   Result Value Ref Range    PT 18.1 (H) 12.0 - 14.6 sec    INR 1.54 (H) 0.87 - 1.13   EKG   Result Value Ref Range    Report       [facility] Emergency Dept.    Test Date:  2023  Pt Name:    MARZENA ROMEO             Department: [facility]  MRN:        [id number]                      Room:       -ROOM 5  Gender:     Male                         Technician: [id number]  :        1950                   Requested By:ER TRIAGE PROTOCOL  Order #:    [id number]                    Ericka MD: MARIAM CHAPA D.O.    Measurements  Intervals                                Axis  Rate:       92                           P:           71  LA:         181                          QRS:        -64  QRSD:       111                          T:          98  QT:         371  QTc:        459    Interpretive Statements  Sinus rhythm  Ventricular premature complex  Left anterior fascicular block  Anterior infarct, old  Nonspecific T abnormalities, lateral leads  ST elevation, consider inferior injury  Compared to ECG 04/03/2023 13:38:47  Ventricular premature complex(es) now present  Left anterior fascicul ar block now present  T-wave abnormality now present  ST (T wave) deviation now present  Myocardial infarct finding still present  Electronically Signed On 4-4-2023 20:51:34 PDT by MARIAM CHAPA D.O.       EKG:   I have independently interpreted the above EKG.    Radiology:   The attending emergency physician has independently interpreted the diagnostic imaging associated with this visit and am waiting the final reading from the radiologist.   Preliminary interpretation is as follows: Chest x-ray was negative  Radiologist interpretation:     CT-CTA CHEST PULMONARY ARTERY W/ RECONS   Final Result         1. No CT evidence of pulmonary embolism.      2. Patchy bibasilar atelectasis.      3. Trace pericardial effusion.      4. Severe coronary calcification.      DX-CHEST-PORTABLE (1 VIEW)   Final Result      1.  There is no acute cardiopulmonary process.   2.  Cardiac silhouette is borderline enlarged.      EC-ECHOCARDIOGRAM COMPLETE W/ CONT    (Results Pending)        COURSE & MEDICAL DECISION MAKING     COURSE AND PLAN  6:12 PM - Patient seen and examined at bedside. Discussed plan of care, including labs and imaging. Patient agrees to the plan of care. The patient will be resuscitated with 1L NS IV and medicated with Zofran 4 mg and morphine 4 mg. Ordered for EKG, CT-CTA Chest Pulmonary Artery with Contrast, DX-Chest, Estimated GFR, Corrected Calcium, proBNP, D-Dimer, Lipase, Troponin, CMP, and CBC w/ Diff to evaluate his symptoms.     8:39 PM -  Patient will be treated with Lasix 40 mg injection.     9:00 PM - Patient was reevaluated at bedside. Discussed lab and radiology results with the patient. The patient had the opportunity to ask any questions. The plan for hospitalization was discussed with the patient given their current presentation and diagnostic study results. Patient is understanding and agreeable to the plan for hospitalization.      ED Summary: Patient presented with vomiting for several days.  There is concerns of dehydration.  And he was started on IV fluids.  He was hypoxic on arrival and he was given oxygen supplementary and this did improve his oxygen level.  Lab test shows that his troponin is negative BNP is elevated so IV fluids were stopped.  His D-dimer was elevated so CTA was done to evaluate for pulmonary embolism.  CT shows a small effusions, no pneumonia, no pulmonary embolism.    He was medicated for nausea with good results, and he will be admitted for further evaluation and treatment.    HYDRATION: Based on the patient's presentation of Acute Vomiting and Dehydration the patient was given IV fluids. IV Hydration was used because oral hydration was not adequate alone. Upon recheck following hydration, the patient was improving.    Heart score is 7    CRITICAL CARE  The very real possibilty of a deterioration of this patient's condition required the highest level of my preparedness for sudden, emergent intervention. I provided critical care services, which included medication orders, frequent reevaluations of the patient's condition and response to treatment, ordering and reviewing test results, and discussing the case with various consultants. The critical care time associated with the care of the patient was 35 minutes. Review chart for interventions. This time is exclusive of any other billable procedures.      DISPOSITION AND DISCUSSIONS  I have discussed management of the patient with the following physicians and CARMEN's:  Dr. Hernandez (Hospitalist)    DISPOSITION:  Patient will be hospitalized by Dr. Hernandez in guarded condition.     FINAL DIAGNOSIS  1. Nausea and vomiting, unspecified vomiting type    2. Left-sided chest pain    3. Acute respiratory failure with hypoxia (HCC)    4. Acute on chronic congestive heart failure, unspecified heart failure type (HCC)      Total Critical Care Time was 35 minutes, as outlined above.      Jia REDD (Scribe), am scribing for, and in the presence of, Leoncio Maldonado D.O..    Electronically signed by: Jia Grant (Scribe), 4/4/2023    Leoncio REDD D.O. personally performed the services described in this documentation, as scribed by Jia Grant in my presence, and it is both accurate and complete.    The note accurately reflects work and decisions made by me.  Leoncio Maldonado D.O.  4/4/2023  10:22 PM

## 2023-04-05 NOTE — DISCHARGE PLANNING
Patient rolled back to observation/outpatient status per attending MD determination (Dr. Jose) and UR committee MD secondary review (Dr. Cantu). Condition code 44 completed

## 2023-04-05 NOTE — CARE PLAN
The patient is Stable - Low risk of patient condition declining or worsening    Shift Goals  Clinical Goals: Manage N/V  Patient Goals: Manage pain, rest  Family Goals: KAL    Progress made toward(s) clinical / shift goals:      Pt acute chest pain managed by prn morphine at this time. Pt urinating frequently. Pt does not call for help out of bed this shift. Bed alarm on. Pt re-educated about importance of fall prevention. Pt gait steady, however, pt connected to IV fluids and O2, periodically nauseous, has urinary frequency, and received morphine this shift. RN re-educated pt about these considerations for safety.     Problem: Pain - Standard  Goal: Alleviation of pain or a reduction in pain to the patient’s comfort goal  Outcome: Progressing     Problem: Urinary - Renal Perfusion  Goal: Ability to achieve and maintain adequate renal perfusion and functioning will improve  Outcome: Progressing     Problem: Fall Risk  Goal: Patient will remain free from falls  Outcome: Progressing

## 2023-04-05 NOTE — PROGRESS NOTES
Pt discharged in stable condition, vitals WNL, all IV access removed. Cardiology scheduled already by patient for procedure outpt in 3 weeks. Discharge instructions given with no further questions. Care relinquished at this time.

## 2023-04-05 NOTE — ASSESSMENT & PLAN NOTE
Noted, on aspirin. Statin intolerant. Monitor. Followed by outpatient cards here and at [place].  Trend trop.

## 2023-04-05 NOTE — ASSESSMENT & PLAN NOTE
Recently saw Dr. Dugan (EP) for ICD re-placement. Has hx of ICD removal due to MSSA bacteremia. Despite BNP up in 3000s, clinically not in HF. Also denies orthopnea, PND, weight gain or worsening LAURI.   Given IVFs in ER then IV lasix 40 x 1. Re assess in AM. From history, dehydration more concerning and possible afib flare up.  Unclear why not on goal directed medical therapy such as ace/arb, bb - defer to [place] as managed by them in outpatient setting. Pt states that he hasn't tolerated a lot of the meds they had tried.

## 2023-04-05 NOTE — PROGRESS NOTES
"RN contacted pharmacy for pending verification of hydroxyurea 500 mg due 2130. Pharmacy states they \"will get to it as soon as they can\".  "

## 2023-04-05 NOTE — PROGRESS NOTES
Monitor Summary:    Rhythm:  SR   Rate Range:  66-98  Ectopy: R-O PVC, R BIG, R PAC    Measurements:  0.18/0.12/0.38

## 2023-04-05 NOTE — PROGRESS NOTES
Received phone report from ER RN Nenita, pt care assumed. VS stable, pt assessment complete. Pt A&Ox4, c/o 0/10 pain at this time. Pt nauseous. Pt received Zofran in ED. Pt 88% O2 sat on room air. Pt placed on 2L NC and sat above 90%. POC discussed with pt and verbalizes no questions. Pt denies any additional needs at this time. Bed locked and in lowest position, bed alarm on. Pt educated on fall risk and verbalized understanding, call light within reach, hourly rounding initiated.

## 2023-04-05 NOTE — PROGRESS NOTES
4 Eyes Skin Assessment Completed by Yuridia RN and Charity RN.    Head WDL  Ears WDL  Nose WDL  Mouth WDL  Neck WDL  Breast/Chest Scar - Previous pacer implant.  Shoulder Blades WDL  Spine WDL  (R) Arm/Elbow/Hand WDL  (L) Arm/Elbow/Hand WDL  Abdomen WDL  Groin WDL  Scrotum/Coccyx/Buttocks Redness and Blanching  (R) Leg WDL  (L) Leg WDL  (R) Heel/Foot/Toe WDL  (L) Heel/Foot/Toe - Cut on heal; dry/cracked.    Devices In Places Tele Box and Nasal Cannula    Interventions In Place Gray Ear Foams    Possible Skin Injury No    Pictures Uploaded Into Epic N/A  Wound Consult Placed N/A  RN Wound Prevention Protocol Ordered No

## 2023-04-07 NOTE — PROGRESS NOTES
NATHAN Becerra reached out to pt regarding enrollment in personal care management. Pt stated he had five minutes to talk because he had a scheduled call coming up. CHW explained services and pt accepted. Pt expressed gratitude to Ketanmicha from his last visit to the [facility]. Pt reports Chet is going to give him a dfib machine for his heart and everyone has been so helpful. Pt reports having a pace maker in the past. CHW could not finish SDOH with pt because he had to go. Pt stated he would like the RN to message him on opvizor with date and time to finish enrollment. Pt has contact information.    Community Health Worker Intake  Social determinates of health intake No.   Contact information provided to Francesco Schulte yes  Has PCP appointment scheduled for 4/11

## 2023-04-11 PROBLEM — R33.8 BENIGN PROSTATIC HYPERPLASIA WITH URINARY RETENTION: Chronic | Status: ACTIVE | Noted: 2017-03-12

## 2023-04-11 PROBLEM — N40.1 BENIGN PROSTATIC HYPERPLASIA WITH URINARY RETENTION: Chronic | Status: ACTIVE | Noted: 2017-03-12

## 2023-04-11 PROBLEM — Z09 HOSPITAL DISCHARGE FOLLOW-UP: Status: ACTIVE | Noted: 2023-01-01

## 2023-04-11 PROBLEM — R23.0 CYANOSIS OF TIP OF FINGER: Status: ACTIVE | Noted: 2023-01-01

## 2023-04-11 PROBLEM — E11.65 TYPE 2 DIABETES MELLITUS WITH HYPERGLYCEMIA (HCC): Chronic | Status: ACTIVE | Noted: 2017-03-13

## 2023-04-11 NOTE — ASSESSMENT & PLAN NOTE
Chronic, cyanosis of the first finger, which patient tells me has been chronic.  He was treated with prolonged course of oral antibiotics for cellulitis of the right hand, which have now resolved.   -Obtain JW of the left upper extremity.

## 2023-04-11 NOTE — ASSESSMENT & PLAN NOTE
Reviewed lab results dated 4/4/2023 - 4/5/2023, echocardiogram and x-ray results.  Medical reconciliation complete, all patient's questions were answered to the best of my knowledge and the patient satisfaction.  Patient will follow-up in 3 months or earlier if needed.

## 2023-04-11 NOTE — PROGRESS NOTES
Subjective:     Chief Complaint   Patient presents with    Hospital Follow-up     Diagnoses of Type 2 diabetes mellitus with hyperglycemia, with long-term current use of insulin (HCC), Hospital discharge follow-up, Cyanosis of tip of finger, and Benign prostatic hyperplasia with urinary retention were pertinent to this visit.    HPI: Francesco is a pleasant 72 y.o. male who presents today hospital follow-up and other chronic issues:    Problem   Hospital Discharge Follow-Up    Patient was admitted on 4/4/2023, discharged on 4/5/2023.  Initial admission after he developed symptoms of acute gastroenteritis after consuming tacos on his way to cardiologist.  He has clinically improved with supportive treatment, ciprofloxacin and Flagyl.  He was discharged home with outpatient follow-up.  He reports feeling well, his BMs are regular, BP within normal limits.       Cyanosis of Tip of Finger    This is a chronic problem, patient does have history of poor circulation in bilateral hands, however he has had a prolonged course of cellulitis of his left hand.  He still has slight cyanosis of the 4th digit distal phalanx, radial pulses decreased, nails are deformed and brittle.       Type 2 Diabetes Mellitus With Hyperglycemia (Hcc)    This is a chronic condition uncontrolled, managed by endocrinology Dr. Mckeon, patient has not seen him for over a year now. His current regimen includes Tresiba 10 Units daily and Farxiga 0.5 tablet daily    Lab Results   Component Value Date/Time    HBA1C 7.2 (H) 04/04/2023 06:07 PM          Lab Results   Component Value Date/Time    HBA1C 8.4 (H) 07/06/2022 10:25 AM      Last diabetic foot exam: 07/26/2022  Last retinal eye exam: Due POCT retinal screen in office.  ACEi/ARB: Lisinopril, on Farxyga   Statin: intolerant   Aspirin: no, on Eliquis  Concomitant HTN: no         Benign Prostatic Hyperplasia With Urinary Retention    Problem, controlled on finasteride and tamsulosin.  Follows with  urology, no issues with urination.          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    Agent orange exposure     Anesthesia     resistant to pain meds    Cancer ([facility])     polycythemia vera    Diabetes ([facility])     insulin and oral meds    Family history of punctured lung 2002    left lung    Heart attack ([facility]) 03/2017    Hemorrhagic disorder ([facility])     on blood thinners    High cholesterol     Ischemic cardiomyopathy     Kidney stones     hx of kidney stones    Lupus ([facility]) 11/15/2019    Orthostatic hypotension 3/29/2018    Pain     headache pain    Polycythemia vera(238.4)     Stroke ([facility]) 2016    r/t afib; eye issues resolved afterward    Urinary bladder disorder     enlarged prostate, weak stream, difficulty urinating    Vertigo      Current Outpatient Medications Ordered in Epic   Medication Sig Dispense Refill    nitroglycerin (NITROSTAT) 0.4 MG SL Tab Place 1 Tablet under the tongue as needed for Chest Pain (or hypertension >180/110). 25 Tablet 3    lisinopril (PRINIVIL) 5 MG Tab Take 1 Tablet by mouth every day. 30 Tablet 3    ondansetron (ZOFRAN ODT) 4 MG TABLET DISPERSIBLE Take 1 Tablet by mouth every four hours as needed for Nausea/Vomiting (give PO if IV route is unavailable.). 10 Tablet 0    apixaban (ELIQUIS) 5mg Tab Take 1 Tablet by mouth 2 times a day. 180 Tablet 0    dicyclomine (BENTYL) 10 MG Cap Take 1 Capsule by mouth every 6 hours as needed (Abdominal cramps). 360 Capsule 3    mupirocin (BACTROBAN) 2 % Ointment Apply 1 Application topically 2 times a day. 22 g 0    Insulin Degludec (TRESIBA FLEXTOUCH) 100 UNIT/ML Solution Pen-injector Inject 10 Units under the skin at bedtime. 15 mL 6    Insulin Pen Needle 32 G x 4 mm (BD PEN NEEDLE VARSHA U/F) 1 Applicator 3 times a day. 100 Each 11    dapagliflozin propanediol (FARXIGA) 10 MG Tab Take 0.5 Tablets by mouth every day. 45 Tablet 1    tamsulosin (FLOMAX) 0.4 MG capsule Take 1 Capsule by mouth every day. 90 Capsule  "1    finasteride (PROSCAR) 5 MG Tab Take 1 Tablet by mouth every day. 90 Tablet 1    hydroxyurea (HYDREA) 500 MG Cap Take 500 mg by mouth 2 Times a Day.       No current [facility]-ordered facility-administered medications on file.     Review of Systems   Constitutional:  Negative for chills and fever.   Cardiovascular:  Negative for chest pain.   Gastrointestinal:  Negative for abdominal pain, constipation, diarrhea, nausea and vomiting.   Genitourinary:  Negative for dysuria and hematuria.   Psychiatric/Behavioral:  Negative for depression.      Objective:     Exam:  /84 (BP Location: Left arm, Patient Position: Sitting, BP Cuff Size: Adult)   Pulse 88   Temp 36.1 °C (96.9 °F) (Temporal)   Ht 1.88 m (6' 2\")   Wt 80 kg (176 lb 5.9 oz)   SpO2 96%   BMI 22.64 kg/m²  Body mass index is 22.64 kg/m².    Physical Exam  Constitutional:       Appearance: Normal appearance.   Cardiovascular:      Rate and Rhythm: Normal rate and regular rhythm.      Pulses: Normal pulses.      Heart sounds: Normal heart sounds. No murmur heard.  Pulmonary:      Effort: Pulmonary effort is normal. No respiratory distress.      Breath sounds: Normal breath sounds.   Musculoskeletal:      Right hand: Normal.      Left hand: Decreased strength. Abnormal pulse.   Neurological:      General: No focal deficit present.      Mental Status: He is alert.   Psychiatric:         Mood and Affect: Mood normal.     Labs: Reviewed multiple results dated 4/4/2023 and 4/5/2023    Assessment & Plan:   Francesco  is a pleasant 72 y.o. male with the following -     Problem List Items Addressed This Visit       Benign prostatic hyperplasia with urinary retention (Chronic)     New, stable, continue Flomax and Proscar, follow-up with urology.         Type 2 diabetes mellitus with hyperglycemia (HCC) (Chronic)     Chronic problem, uncontrolled.  Continue insulin, urged  to follow with endocrinology Dr. Mckeon.   -Continue insulin   - Obtain " microalbumin/creatinine   - POCT retinal screening.         Relevant Orders    MICROALBUMIN CREAT RATIO URINE    Cyanosis of tip of finger     Chronic, cyanosis of the first finger, which patient tells me has been chronic.  He was treated with prolonged course of oral antibiotics for cellulitis of the right hand, which have now resolved.   -Obtain JW of the left upper extremity.         Relevant Orders    US-EXTREMITY ARTERY UPPER UNILAT W/WBI (COMBO)    Hospital discharge follow-up     Reviewed lab results dated 4/4/2023 - 4/5/2023, echocardiogram and x-ray results.  Medical reconciliation complete, all patient's questions were answered to the best of my knowledge and the patient satisfaction.  Patient will follow-up in 3 months or earlier if needed.            Return in about 3 months (around 7/11/2023), or if symptoms worsen or fail to improve.    Please note that this dictation was created using voice recognition software. I have made every reasonable attempt to correct obvious errors, but I expect that there are errors of grammar and possibly content that I did not discover before finalizing the note.

## 2023-04-11 NOTE — ASSESSMENT & PLAN NOTE
Chronic problem, uncontrolled.  Continue insulin, urged  to follow with endocrinology Dr. Mckeon.   -Continue insulin   - Obtain microalbumin/creatinine   - POCT retinal screening.

## 2023-04-13 NOTE — TELEPHONE ENCOUNTER
Contacted pt to address immediate concern mentioned in MyChart message. Pt is supposed to have an US done d/t to cyanosis of finger tip. He called to schedule and soonest available appt is 6/13. This RN contacted US scheduling on pt's behalf per his request. Soonest available is June because upper extremity ultrasounds are done through vascular and there are only two locations. Unable to schedule sooner, but pt was added to cancellation list.

## 2023-04-18 NOTE — PROGRESS NOTES
Contacted Bob to go over Personal Care Management. Pt did not want to discuss further. He is no longer interested in CCM services. Will not continue to follow at this time.

## 2023-04-19 NOTE — TELEPHONE ENCOUNTER
Hospitalist prescribed medication lisinopril     Last Wednesday 4/12 Chest pain, dehydration disorientation and migraines stopped after taking medication    Unable to do US-EXTREMITY ARTERY UPPER UNILAT W/WBI scan till June 13th but is on a wait list.

## 2023-04-27 NOTE — PROGRESS NOTES
62M w/ hx of current every day smoker, DM2, CAD s/p YADIRA to LAD (2013), presenting initially with CP, found to have AWSTEMI and admitted to CICU. s/p LHC and found to have TVD, s/p IABP placement and removal. Evaluated for CABG, but pt/family opted for LHC with high risk PCI, now s/p DESx2 to pLAD and mLAD (4/23/23). Initial TTE showed significantly reduced LVEF of 33%. Transferred to Medicine for further management and subsequently developed pericarditis which is now resolving.  MS: SR/ SB down to 36  46-69  0.20/0.12/0.44

## 2023-05-08 NOTE — DISCHARGE INSTRUCTIONS
Defibrillator Discharge Instructions/[facility]    1.  No showers until seen in follow up; may take sponge bath.  Keep dressing dry & in place until seen at for you follow up visit at the cardiology office.     2.  Avoid excessive lifting more than 10 lbs, pushing, pulling, or twisting to implant side until site fully healed (6 weeks).  3.  No driving if taking pain medication.  4.  Call our office ([phone]) if you notice any increased swelling, redness, warmth, or drainage at the implant site.  5.  Needs to be seen in emergency if you develop fever > 101F or uncontrolled pain.  6.  Always check with device clinic before any planned MRI to see if device is MRI compatible.  7. Do not place cell phones or mobile devices directly over implanted device.   8. You will need to be seen at least twice in the device clinic for checks while the pacemaker is healing.  Expect appointment approximately one week after implant and 6-7 weeks post implant.     ICD Instructions  If has 1 shock: if feeling fine can notify cardiology office and be seen for appointment and check.  If feeling poorly after shock you are anyone with you needs to call 911.  If you experience 2 Shocks or more in 24 hours: please call 911.      HOME CARE INSTRUCTIONS    ACTIVITY: Rest and take it easy for the first 24 hours.  A responsible adult is recommended to remain with you during that time.  It is normal to feel sleepy.  We encourage you to not do anything that requires balance, judgment or coordination.    FOR 24 HOURS DO NOT:  Drive, operate machinery or run household appliances.  Drink beer or alcoholic beverages.  Make important decisions or sign legal documents.    DIET: To avoid nausea, slowly advance diet as tolerated, avoiding spicy or greasy foods for the first day.  Add more substantial food to your diet according to your physician's instructions. INCREASE FLUIDS AND FIBER TO AVOID CONSTIPATION.    MEDICATIONS: Resume taking daily  medication.  Take prescribed pain medication with food.  If no medication is prescribed, you may take non-aspirin pain medication if needed.  PAIN MEDICATION CAN BE VERY CONSTIPATING.  Take a stool softener or laxative such as senokot, pericolace, or milk of magnesia if needed.    Prescription given for _____________.  Last pain medication given at none.    A follow-up appointment should be arranged with your doctor in 1 week; call to schedule.    You should CALL YOUR PHYSICIAN if you develop:  Fever greater than 101 degrees F.  Pain not relieved by medication, or persistent nausea or vomiting.  Excessive bleeding (blood soaking through dressing) or unexpected drainage from the wound.  Extreme redness or swelling around the incision site, drainage of pus or foul smelling drainage.  Inability to urinate or empty your bladder within 8 hours.  Problems with breathing or chest pain.    You should call 911 if you develop problems with breathing or chest pain.  If you are unable to contact your doctor or surgical center, you should go to the nearest emergency room or urgent care center.  Physician's telephone #: [phone] Cardiology    MILD FLU-LIKE SYMPTOMS ARE NORMAL.  YOU MAY EXPERIENCE GENERALIZED MUSCLE ACHES, THROAT IRRITATION, HEADACHE AND/OR SOME NAUSEA.    If any questions arise, call your doctor.  If your doctor is not available, please feel free to call the Surgical Center at [phone].  The Center is open Monday through Friday from 7AM to 7PM.      A registered nurse may call you a few days after your surgery to see how you are doing after your procedure.    You may also receive a survey in the mail within the next two weeks and we ask that you take a few moments to complete the survey and return it to us.  Our goal is to provide you with very good care and we value your comments.     Depression / Suicide Risk    As you are discharged from this [facility] facility, it is important to learn how to  keep safe from harming yourself.    Recognize the warning signs:  Abrupt changes in personality, positive or negative- including increase in energy   Giving away possessions  Change in eating patterns- significant weight changes-  positive or negative  Change in sleeping patterns- unable to sleep or sleeping all the time   Unwillingness or inability to communicate  Depression  Unusual sadness, discouragement and loneliness  Talk of wanting to die  Neglect of personal appearance   Rebelliousness- reckless behavior  Withdrawal from people/activities they love  Confusion- inability to concentrate     If you or a loved one observes any of these behaviors or has concerns about self-harm, here's what you can do:  Talk about it- your feelings and reasons for harming yourself  Remove any means that you might use to hurt yourself (examples: pills, rope, extension cords, firearm)  Get professional help from the community (Mental Health, Substance Abuse, psychological counseling)  Do not be alone:Call your Safe Contact- someone whom you trust who will be there for you.  Call your local CRISIS HOTLINE 907-3144 or 171-411-3245  Call your local Children's Mobile Crisis Response Team [place] (333) 094-2713 or www.Phytel  Call the toll free National Suicide Prevention Hotlines   National Suicide Prevention Lifeline 793-049-JZRH (0563)  National Hope Line Network 800-SUICIDE (147-7727)    I acknowledge receipt and understanding of these Home Care instructions.

## 2023-05-08 NOTE — ANESTHESIA PROCEDURE NOTES
Airway    Date/Time: 5/8/2023 2:52 PM  Performed by: Ron Jimenez M.D.  Authorized by: Ron Jimenez M.D.     Location:  OR  Urgency:  Elective  Difficult Airway: No    Indications for Airway Management:  Anesthesia      Spontaneous Ventilation: absent    Sedation Level:  Deep  Preoxygenated: Yes    Patient Position:  Sniffing  Mask Difficulty Assessment:  0 - not attempted  Final Airway Type:  Endotracheal airway  Final Endotracheal Airway:  ETT  Cuffed: Yes    Technique Used for Successful ETT Placement:  Direct laryngoscopy    Insertion Site:  Oral  Blade Type:  Naty  Laryngoscope Blade/Videolaryngoscope Blade Size:  3  ETT Size (mm):  7.5  Measured from:  Teeth  ETT to Teeth (cm):  22  Placement Verified by: auscultation and capnometry    Cormack-Lehane Classification:  Grade I - full view of glottis  Number of Attempts at Approach:  1

## 2023-05-08 NOTE — OP REPORT
Electrophysiology Procedure Note  [facility] [place]      Date of procedure: 5/8/2023     Procedure Performed: Placement of Subcutaneous AICD, Defibrillation testing    Indication: ICM EF <35% with chronic systolic heart failure    Physician(s): Alberto Isaac MD    Anesthesia:  General    Anaesthesiologist: Dr Jimenez    Specimen(s) Removed: None     Estimated Blood Loss:  <30ss    Complications: None    Pre Procedure ECG: SR    Post Procedure ECG: SR    DESCRIPTION OF PROCEDURE: After informed written consent, the patient was brought to the electrophysiology lab in the fasting, unsedated state. The patient was prepped and draped in the usual sterile fashion for subcutaneous ICD placement. The procedure was performed under general anaesthesia with local anesthetic. The left inframammary region was anesthetized and an incision was made. Blunt dissection and cautery was performed down to the muscle/ribs and the defibrillator pocket was formed between the serratus anterior and the latissimus dorsi. An incision was made at the substernal region following local anesthesia. Blunt dissection and cautery was performed down to the fascia.  A nonabsorbable suture was placed ×2 at the substernal incision.  Tunneling was performed from the substernal incision to the defibrillator pocket.  The defibrillator lead was tunneled to the substernal incision.  The lead was sewn down appropriately with 2 non-absorbable sutures at this location.  Using the tunneling tool and a tear-away sheath, the sheath was tunneled in the left parasternal region. The defibrillator lead was then deployed in this location.  The tear-away sheath was then removed.  The defibrillator lead was then attached to the defibrillator generator and placed in the pocket with a significant amount of the device posterior to the mid axillary line.  The header was sewn to the muscle to prevent slippage.  Hemostasis was confirmed and both the pocket and the  substernal incision were irrigated with antibiotic solution.  Both incisions were closed with 3 layers of absorbable suture.  Device testing was attempted but pt was non-inducible. Impedence testing in the pocket was within normal parameters.   A dressing was applied.  The patient was sent to recovery.      IMPLANTED DEVICE INFORMATION:    Pulse generator is a [employer] model A219   Serial number [id number]      LEAD INFORMATION:  1. AICD lead is a [employer] model 3501, serial number [id number]    DEVICE PROGRAMMING:    Tachy therapy:  VT >220 bpm VF >250 bpm    DEFIBRILLATION THRESHOLD TESTING:    Attempted, non inducible.  Impedance 75 ohms    FLUOROSCOPY TIME: 0.1 min    SUMMARY/CONCLUSIONS:  1. Successful S-ICD implant    RECOMMENDATIONS:  1. Admit to monitored bed.  2. Chest x-ray.  3. Follow-up in device clinic for wound check and device interrogation.

## 2023-05-08 NOTE — ANESTHESIA PREPROCEDURE EVALUATION
" Date/Time: 05/08/23 1400    Scheduled providers: Alberto Isaac M.D.; Ron Jimenez M.D.    Procedure: CL-SUBCUTANEOUS INTERNAL CARDIAC DEFIBRILLATOR (SICD)    Diagnosis:       Chronic systolic heart failure (HCC) [I50.22]      Chronic systolic (congestive) heart failure [I50.22]    Indications: See Associated Dx    Location: [facility] - CATH LAB - [facility]          Relevant Problems   CARDIAC   (positive) Carotid stenosis, 90% right carotid   (positive) Coronary artery disease involving native coronary artery without angina pectoris   (positive) Essential hypertension   (positive) Paroxysmal A-fib (HCC)   (positive) STEMI (ST elevation myocardial infarction) (HCC)   (positive) Stenosis of right carotid artery-Right CEA 3/21/18         (positive) Nephrolithiasis      ENDO   (positive) Type 2 diabetes mellitus with hyperglycemia (HCC)      Other   (positive) Polycythemia vera (HCC)     BP (!) 145/86   Pulse 64   Temp 36 °C (96.8 °F) (Temporal)   Resp 20   Ht 1.88 m (6' 2\")   Wt 78.3 kg (172 lb 9.9 oz)   SpO2 98%   BMI 22.16 kg/m²     Physical Exam    Airway   Mallampati: II  TM distance: >3 FB  Neck ROM: full       Cardiovascular - normal exam  Rhythm: regular  Rate: normal  (-) murmur     Dental - normal exam           Pulmonary - normal exam  Breath sounds clear to auscultation     Abdominal    Neurological - normal exam                 Anesthesia Plan    ASA 3   ASA physical status 3 criteria: CVA or TIA - history (> 3 months)    Plan - general       Airway plan will be ETT          Induction: intravenous    Postoperative Plan: Postoperative administration of opioids is intended.    Pertinent diagnostic labs and testing reviewed    Informed Consent:    Anesthetic plan and risks discussed with patient.    Use of blood products discussed with: patient whom consented to blood products.         "

## 2023-05-08 NOTE — ANESTHESIA PROCEDURE NOTES
Arterial Line  Performed by: Ron Jimenez M.D.  Authorized by: Ron Jimenez M.D.     Start Time:  5/8/2023 2:56 PM  End Time:  5/8/2023 3:00 PM  Localization: surface landmarks    Patient Location:  OR  Indication: continuous blood pressure monitoring        Catheter Size:  20 G  Seldinger Technique?: Yes    Laterality:  Right  Site:  Radial artery  Line Secured:  Antimicrobial disc, tape and transparent dressing  Events: patient tolerated procedure well with no complications and hematoma     Two attempts made.  Hematoma noted after first attempt, so ultrasound used on second.  TR band placed for added pressure to reduce hematoma, which was successful.  TR band removed at end of case.

## 2023-05-08 NOTE — H&P
[facility]  Electrophysiology Pre-procedure H&P    DOS:5/8/2023    Planned Procedure: ICD implant    Chief complaint/Reason for Procedure: Chronic systolic heart failure    HPI: 71 y/o M with chronic systolic heart failure, IMC, EF <35%, Prior ICD with infection and extraction, for S-ICD implant.      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    Agent orange exposure     Anesthesia     resistant to pain meds    Cancer ([facility])     polycythemia vera    Diabetes ([facility])     insulin and oral meds    Family history of punctured lung 2002    left lung    Heart attack ([facility]) 03/2017    Hemorrhagic disorder ([facility])     on blood thinners    High cholesterol     Ischemic cardiomyopathy     Kidney stones     hx of kidney stones    Lupus ([facility]) 11/15/2019    Orthostatic hypotension 3/29/2018    Pain     headache pain    Polycythemia vera(238.4)     Stroke ([facility]) 2016    r/t afib; eye issues resolved afterward    Urinary bladder disorder     enlarged prostate, weak stream, difficulty urinating    Vertigo        Past Surgical History:   Procedure Laterality Date    PAMELA N/A 10/30/2021    Procedure: ECHOCARDIOGRAM, TRANSESOPHAGEAL;  Surgeon: Beau Gutierrez M.D.;  Location: [facility];  Service: Cardiac    AICD IMPLANT  07/29/2021    [employer] D233 implanted by Dr. Isaac.    SPLIT THICKNESS SKIN GRAFT N/A 8/23/2020    Procedure: APPLICATION, GRAFT, SKIN, SPLIT-THICKNESS;  Surgeon: Elisa Craig M.D.;  Location: [facility];  Service: Plastics    SKIN ABSCESS INCISION AND DRAINAGE Right 8/3/2020    Procedure: INCISION AND DRAINAGE - SCROTAL WOUND - WOUND VAC PLACEMENT;  Surgeon: Jaylen Hayes M.D.;  Location: [facility];  Service: Urology    NC EXPLORE SCROTUM Right 7/31/2020    Procedure: EXPLORATION, SCROTUM - FOR WASH OUT OF SCROTAL WOUND & WOUND VAC PLACEMENT;  Surgeon: Ferny Rosales M.D.;  Location: [facility];   Service: Urology    ND EXPLORE SCROTUM Right 7/29/2020    Procedure: EXPLORATION, SCROTUM - FOR I & D OF SCROTAL AND  GROIN WOUND;  Surgeon: Donta Viera M.D.;  Location: SURGERY [facility];  Service: Urology    ND INCIS/DRAIN SCROTUM/TESTIS,EPIDIDYM Right 7/25/2020    Procedure: INCISION AND DRAINAGE, SCROTUM;  Surgeon: Nick Negro M.D.;  Location: SURGERY [facility];  Service: Urology    TEMPORAL ARTERY BIOPSY Right 6/26/2018    Procedure: TEMPORAL ARTERY BIOPSY;  Surgeon: Rajendra Aguilar M.D.;  Location: SURGERY SAME DAY [facility];  Service: General    CAROTID ENDARTERECTOMY Right 3/21/2018    Procedure: CAROTID ENDARTERECTOMY- W/EEG MONITORING;  Surgeon: Benny Morfin M.D.;  Location: SURGERY [facility];  Service: General    APPENDECTOMY      CATH PLACEMENT      right arm    LAMINOTOMY      diskectomy; C4    OTHER CARDIAC SURGERY      stents x 3       Social History     Socioeconomic History    Marital status:      Spouse name: Not on file    Number of children: Not on file    Years of education: Not on file    Highest education level: Not on file   Occupational History    Not on file   Tobacco Use    Smoking status: Never    Smokeless tobacco: Never   Vaping Use    Vaping Use: Never used   Substance and Sexual Activity    Alcohol use: No    Drug use: No    Sexual activity: Not on file   Other Topics Concern    Not on file   Social History Narrative    Not on file     Social Determinants of Health     Financial Resource Strain: Low Risk     Difficulty of Paying Living Expenses: Not hard at all   Food Insecurity: No Food Insecurity    Worried About Running Out of Food in the Last Year: Never true    Ran Out of Food in the Last Year: Never true   Transportation Needs: No Transportation Needs    Lack of Transportation (Medical): No    Lack of Transportation (Non-Medical): No   Physical Activity: Not on file   Stress: Not on file   Social Connections: Not on file  "  Intimate Partner Violence: Not on file   Housing Stability: Not on file       Family History   Problem Relation Age of Onset    Ovarian Cancer Neg Hx     Diabetes Neg Hx        Allergies   Allergen Reactions    Doxycycline      Swelling lips and difficulties swallowing    Lisinopril Anaphylaxis     Pt coded    Atorvastatin      Pt and pts wife are not sure what happens     Beta Adrenergic Blockers Unspecified     dizziness    Eplerenone Unspecified     Intolerance, dehydration, altered mental status    Metformin Unspecified and Palpitations     Cardiac effects  \"Similar to a heart attack, I was hospitalized\"    Simvastatin      Other reaction(s): Other (Specify with Comments)  Myalgias  Myalgias    Spironolactone Palpitations    Statins [Hmg-Coa-R Inhibitors]      Muscle ache, joint pain       Current Facility-Administered Medications   Medication Dose Route Frequency Provider Last Rate Last Admin    lidocaine (XYLOCAINE) 1 % injection 0.5 mL  0.5 mL Intradermal Once PRN Alberto Isaac M.D.        lactated ringers infusion   Intravenous Continuous Alberto Isaac M.D. 10 mL/hr at 05/08/23 1228 New Bag at 05/08/23 1228       Physical Exam:  Vitals:    05/08/23 1153   BP: (!) 145/86   Pulse: 64   Resp: 20   Temp: 36 °C (96.8 °F)   TempSrc: Temporal   SpO2: 98%   Weight: 78.3 kg (172 lb 9.9 oz)   Height: 1.88 m (6' 2\")     General appearance: NAD, conversant   Neck: Trachea midline; FROM, supple, no thyromegaly or lymphadenopathy  CV: RRR, no MRGs, no JVD   Extremities: No peripheral edema or extremity lymphadenopathy  Skin: Normal temperature, turgor and texture; no rash, ulcers or subcutaneous nodules  Psych: Appropriate affect, alert and oriented to person, place and time    Data:  Lab Results   Component Value Date/Time    CHOLSTRLTOT 132 04/05/2023 01:15 AM    LDL 88 04/05/2023 01:15 AM    HDL 34 (A) 04/05/2023 01:15 AM    TRIGLYCERIDE 49 04/05/2023 01:15 AM       Lab Results   Component Value Date/Time    " SODIUM 137 05/04/2023 07:27 AM    POTASSIUM 3.9 05/04/2023 07:27 AM    CHLORIDE 100 05/04/2023 07:27 AM    CO2 24 05/04/2023 07:27 AM    GLUCOSE 255 (H) 05/04/2023 07:27 AM    BUN 24 (H) 05/04/2023 07:27 AM    CREATININE 1.06 05/04/2023 07:27 AM     Lab Results   Component Value Date/Time    ALKPHOSPHAT 77 05/04/2023 07:27 AM    ASTSGOT 13 05/04/2023 07:27 AM    ALTSGPT 13 05/04/2023 07:27 AM    TBILIRUBIN 0.6 05/04/2023 07:27 AM      Lab Results   Component Value Date/Time    BNPBTYPENAT 371 (H) 01/03/2019 09:36 AM               EKG interpreted by me:     Impression/Plan:  1) ICM EF M35%  2) Chronic systolic heart failure NYHA II    - Plan S-ICD insert. The risk, benefits, and alternatives to ICD placement were discussed in great detail, specific risks mentioned including bleeding, infection, cardiac perforation with possible tamponade requiring pericardiocentesis or open heart surgery. The [place] patient decision making tool was used to decide on ICD implantation.  In addition the possibility of lead dislodgment, inappropriate shocks, pneumothorax, hemothorax were discussed. Also mentioned were the possibility of death, stroke, and myocardial infarction. The patient verbalized understanding of these potential complications and wishes to proceed with this procedure.         Alberto Isaac MD  Cardiac Electrophysiology

## 2023-05-08 NOTE — ANESTHESIA TIME REPORT
Anesthesia Start and Stop Event Times     Date Time Event    5/8/2023 1414 Ready for Procedure     1446 Anesthesia Start     1624 Anesthesia Stop        Responsible Staff  05/08/23    Name Role Begin End    Ron Jimenez M.D. Anesth 1448 1624        Overtime Reason:  no overtime (within assigned shift)    Comments:

## 2023-05-08 NOTE — OR NURSING
Assume care for pt in pre-op. Patient allergies and NPO status verified. Belongings secured. Patient verbalizes understanding of pain scale, expected course of stay and plan of care. Surgical procedure verified with patient. IV access established. FBS = 154. Call light within reach. No further needs at this time. Hourly rounding in place.

## 2023-05-09 NOTE — TELEPHONE ENCOUNTER
ALEXEY    Caller:  -Aretha (shelbie)    Topic/issue: Having issues with remote monitoring - still showing way orange lines.     Callback Number: [phone]    Thank you,  Nova KABA

## 2023-05-09 NOTE — OR NURSING
1830H - Assumed care in pacu 2 . Patient is alert , oriented x 4 not in distress , Denies any pain, coughing occasionally.  1834H: v/s taken and recorded, Dressing sternum and mid axillary area is clean , dry and intact.   1840H: Belongings returned to patient all items is complete.  Assisted in putting his clothes on.   1845H; wheeled to  the lobby and discharge instructions given to daughter Aretha.

## 2023-05-09 NOTE — OR NURSING
1618 Pt out from cath lab post sub Q ICD insertion. Left lateral chest site is CDI, no signs of bleeding. Mid sternal chest site is CDI, no signs of bleeding. Pt sleeping with oral airway in place.  1645 Pt awake and alert, denies pain but is complaining of nausea. Medicated per MAR.  1715 Daughter Aretha called and updated on patient status.  1740 Tolerating orals  1818 2 hour monitoring in PACU complete. Left lateral chest site is CDI, no signs of bleeding. Mid sternal chest site is CDI, no signs of bleeding.  1819 Pt ambulated to restroom, tolerated well  1827 Report to Gina JORGENSEN  1829 Pt over to Phase II

## 2023-05-09 NOTE — ANESTHESIA POSTPROCEDURE EVALUATION
Patient: Francesco Schulte    Procedure Summary     Date: 05/08/23 Room / Location: [facility] IMAGING - CATH LAB [facility]    Anesthesia Start: 1446 Anesthesia Stop: 1624    Procedure: CL-SUBCUTANEOUS INTERNAL CARDIAC DEFIBRILLATOR (SICD) Diagnosis:       Chronic systolic heart failure (HCC)      Chronic systolic (congestive) heart failure      (See Associated Dx)    Scheduled Providers: Alberto Isaac M.D.; Ron Jimenez M.D. Responsible Provider: Ron Jimenez M.D.    Anesthesia Type: general ASA Status: 3          Final Anesthesia Type: general  Last vitals  BP   Blood Pressure : (!) 142/76    Temp   36.4 °C (97.5 °F)    Pulse   76   Resp   18    SpO2   95 %      Anesthesia Post Evaluation    Patient location during evaluation: PACU  Patient participation: complete - patient participated  Level of consciousness: awake and alert    Airway patency: patent  Anesthetic complications: no  Cardiovascular status: hemodynamically stable  Respiratory status: face mask    PONV: none          No notable events documented.     Nurse Pain Score: 0 (NPRS)

## 2023-08-08 NOTE — CARDIAC REMOTE MONITOR - SCAN
Device transmission reviewed. Device demonstrated appropriate function.       Electronically Signed by: Alberto Isaac M.D.    8/15/2023  9:32 AM

## 2023-08-16 NOTE — TELEPHONE ENCOUNTER
Phone Number Called: [phone]    Call outcome: Spoke to patient regarding message below.    Message: Contacted patient med  wanted patient to be seen as soon as we could get him in the schedule because he was having issues. Patient is is a former [employer]. I contacted patient and offered him an appointment on 12/28 that was the soonest appointment we were able to find. Patient was upset that the soonest appointment I was able to find him was in in December 12/28. Patient was upset that was the only appointment I was able to find him. I let him know that unfortunately we lost a provider and the soonest availability was in honestly Middle of April but I was accommodating him in a sooner appointment. Patient did want this appointment because he had family coming over from [place] and he was not going to miss them. He requested an appointment in January. I offered him 01/04 and he took that appointment. Patient asked me who he could reach if he ran out of insulin. I let him know. I would let Dr. Stephens. Know and Dr. Stephens could communicate with Nereyda GLODEN and give her advice if he needed any sort of refills. I reached out to med  and was told she would send a message to Nereyda Moore letting her know we able to get him in the soonest 01/04 because he refused the week before and if she needed any assistance she may contact Dr. Stephens.    Universal Safety Interventions

## 2023-08-18 NOTE — PROGRESS NOTES
Device is functioning appropriate function.  Software update due to advisory.  Check done by rep.

## 2023-09-20 NOTE — WOUND TEAM
Psychiatric progress note    CC: Psych F/U/ reason for consultation-bipolar disorder most recent episode depressed and adjustment disorder with anxiety.  Treatment recommendations    Subjective:    Patient seen in extensive chart review form, reviewed patient's previous history most recent symptoms prior to admission.  Case discussed with covering physician and staff.  Patient most recent labs, vital signs or medication list.    Patient denies any passive suicidal homicidal ideation, denies any visual auditory hallucination paranoid delusions.  Patient noted to require Zyprexa 2.5 mg x 2 overnight for anxiety and agitation.  Patient difficulty with sleep appetite is also diminished states that his mood is depressed.  Patient continues to have a moderate to high level of anxiety he is alert and oriented to person and place not fully to situation.    We discussed treatment plan in detail his Abilify and Remeron are likely at optimal doses at this time continue with Zyprexa as needed for agitation or severe anxiety.  Melatonin has been ineffective to help with insomnia leading to patient getting multiple as needed's overnight we will discontinue melatonin and start trazodone and titrate up to optimal dose monitor for oversedation.  Provided brief supportive psychotherapy focused on psychoeducation.      Labs:  Recent Results (from the past 24 hour(s))   Vancomycin, Trough    Collection Time: 09/19/23 11:03 AM   Result Value Ref Range    Vancomycin, Trough 11.0 10.0 - 20.0 mcg/mL   GLUCOSE, BEDSIDE - POINT OF CARE    Collection Time: 09/19/23 11:27 AM   Result Value Ref Range    GLUCOSE, BEDSIDE - POINT OF CARE 117 (H) 70 - 99 mg/dL   GLUCOSE, BEDSIDE - POINT OF CARE    Collection Time: 09/19/23  5:01 PM   Result Value Ref Range    GLUCOSE, BEDSIDE - POINT OF CARE 106 (H) 70 - 99 mg/dL   GLUCOSE, BEDSIDE - POINT OF CARE    Collection Time: 09/19/23 11:26 PM   Result Value Ref Range    GLUCOSE, BEDSIDE - POINT OF CARE  "Received wound consult for NPWT placement to scrotal incision. Pt refusing bedside changes d/t extreme pain. Pt and family member Charla (daugther) requesting that pt be \"knocked out\" as pt did not tolerate vac removal on 7/29. Pt also underwent further debridement that same day RT worsening pustules and induration. Initially obtained orders for premedication from Dr. Hood including morphine and and topical lidocaine. Pt continued to refuse. Spoke to ICU RN supervisor for possible bedside conscious sedation, but unfortunately, at this time it is not standard practice for [facility] ICU. Notified Dominga PARRY and Dr. Rosales with urology who will schedule pt for OR tomorrow. This RN will be present to assist with dressing application. Currently waiting for exact time from Dr. Rosales. Nursing and pt aware of NPO status after midnight/     At this time pt is not safe to discharge home with home health. Pt may need initial dressing changes in OR if pain remains uncontrolled. Dominga PARRY, Primary RNs Liane, Case management, patient and family member Charla have all been updated. Nursing to change wet-to-dry dressing if dislodged. Wound team will continue to follow.  " 80 70 - 99 mg/dL   Magnesium    Collection Time: 09/20/23  6:16 AM   Result Value Ref Range    Magnesium 1.7 1.7 - 2.4 mg/dL   Phosphorus    Collection Time: 09/20/23  6:16 AM   Result Value Ref Range    Phosphorus 2.8 2.4 - 4.7 mg/dL   Comprehensive Metabolic Panel    Collection Time: 09/20/23  6:16 AM   Result Value Ref Range    Fasting Status      Sodium 140 135 - 145 mmol/L    Potassium 3.6 3.4 - 5.1 mmol/L    Chloride 109 97 - 110 mmol/L    Carbon Dioxide 26 21 - 32 mmol/L    Anion Gap 9 7 - 19 mmol/L    Glucose 90 70 - 99 mg/dL    BUN 17 6 - 20 mg/dL    Creatinine 1.00 0.67 - 1.17 mg/dL    Glomerular Filtration Rate 83 >=60    BUN/Cr 17 7 - 25    Calcium 8.0 (L) 8.4 - 10.2 mg/dL    Bilirubin, Total 0.3 0.2 - 1.0 mg/dL    GOT/AST 5 <=37 Units/L    GPT/ALT 10 <64 Units/L    Alkaline Phosphatase 52 45 - 117 Units/L    Albumin 2.1 (L) 3.6 - 5.1 g/dL    Protein, Total 5.8 (L) 6.4 - 8.2 g/dL    Globulin 3.7 2.0 - 4.0 g/dL    A/G Ratio 0.6 (L) 1.0 - 2.4   CBC with Automated Differential (performable only)    Collection Time: 09/20/23  6:16 AM   Result Value Ref Range    WBC 11.4 (H) 4.2 - 11.0 K/mcL    RBC 3.13 (L) 4.50 - 5.90 mil/mcL    HGB 8.0 (L) 13.0 - 17.0 g/dL    HCT 26.2 (L) 39.0 - 51.0 %    MCV 83.7 78.0 - 100.0 fl    MCH 25.6 (L) 26.0 - 34.0 pg    MCHC 30.5 (L) 32.0 - 36.5 g/dL    RDW-CV 19.6 (H) 11.0 - 15.0 %    RDW-SD 59.5 (H) 39.0 - 50.0 fL     140 - 450 K/mcL    NRBC 0 <=0 /100 WBC    Neutrophil, Percent 66 %    Lymphocytes, Percent 19 %    Mono, Percent 9 %    Eosinophils, Percent 3 %    Basophils, Percent 1 %    Immature Granulocytes 2 %    Absolute Neutrophils 7.5 1.8 - 7.7 K/mcL    Absolute Lymphocytes 2.2 1.0 - 4.0 K/mcL    Absolute Monocytes 1.0 (H) 0.3 - 0.9 K/mcL    Absolute Eosinophils  0.4 0.0 - 0.5 K/mcL    Absolute Basophils 0.1 0.0 - 0.3 K/mcL    Absolute Immature Granulocytes 0.2 0.0 - 0.2 K/mcL   GLUCOSE, BEDSIDE - POINT OF CARE    Collection Time: 09/20/23  8:33 AM   Result Value  Ref Range    GLUCOSE, BEDSIDE - POINT OF CARE 80 70 - 99 mg/dL         Medications:    Current Facility-Administered Medications   Medication Dose Route Frequency Provider Last Rate Last Admin   • magnesium oxide (MAG-OX) tablet 400 mg  400 mg Oral Once Masoud Cardenas MD       • apixaBAN (ELIQUIS) tablet 5 mg  5 mg Oral 2 times per day Carmella Dee DO   5 mg at 09/20/23 0828   • ARIPiprazole (ABILIFY) tablet 15 mg  15 mg Oral Daily Carmella Dee DO   15 mg at 09/20/23 0830   • levothyroxine (SYNTHROID, LEVOTHROID) tablet 125 mcg  125 mcg Oral QAM AC Carmella Dee DO   125 mcg at 09/20/23 0509   • mirtazapine (REMERON) tablet 30 mg  30 mg Oral Nightly Carmella Dee DO   30 mg at 09/19/23 2142   • pantoprazole (PROTONIX) 40 MG/20ML (compounded) suspension 40 mg  40 mg Oral BID Abreakfast & HS Carmella Dee DO   40 mg at 09/20/23 0831   • rosuvastatin (CRESTOR) tablet 10 mg  10 mg Oral Daily Carmella Dee DO   10 mg at 09/20/23 0830   • simethicone (MYLICON) 40 MG/0.6ML drops 126.6667 mg  126.6667 mg Oral 4x Daily Carmella Dee DO   126.6667 mg at 09/20/23 0829   • acetaminophen (TYLENOL) 160 MG/5ML solution 650 mg  650 mg Oral Q6H PRN Carmella Dee DO   650 mg at 09/19/23 2347   • oxyCODONE (IMM REL) (ROXICODONE) tablet 10 mg  10 mg Oral Q4H PRN Carmella Dee DO   10 mg at 09/20/23 0848   • melatonin tablet 9 mg  9 mg Oral Nightly Carmella Dee DO   9 mg at 09/19/23 2141   • OLANZapine (ZyPREXA ZYDIS) disintegrating tablet 2.5 mg  2.5 mg Oral TID PRN Angela Nieto, DO   2.5 mg at 09/20/23 0509   • fluticasone (FLONASE) 50 MCG/ACT nasal spray 1 spray  1 spray Each Nare Daily PRN Tran Mckeon, CNP   1 spray at 09/16/23 0511   • sodium chloride 0.9% infusion   Intravenous Continuous PRN Esperanza Lambert MD       • OLANZapine (ZyPREXA) IM injection 2.5 mg  2.5 mg Intramuscular BID PRN Pita Parr MD   2.5 mg at 09/19/23 4530   • furosemide (LASIX) tablet 40 mg  40 mg Oral Daily Esperanza Lambert MD   40 mg at 09/20/23  0829   • hydrALAZINE (APRESOLINE) injection 10 mg  10 mg Intravenous Q6H PRN Angela Nieto DO       • vancomycin (VANCOCIN) 1,000 mg in sodium chloride 0.9 % 250 mL IVPB  1,000 mg Intravenous Q36H Yong Erwin .7 mL/hr at 09/19/23 1129 1,000 mg at 09/19/23 1129   • lidocaine (LIDOCARE) 4 % patch 1 patch  1 patch Transdermal Daily Daja Blanco MD   1 patch at 09/20/23 0831   • cefepime (MAXIPIME) 1,000 mg in sodium chloride 0.9 % 100 mL IVPB  1,000 mg Intravenous 3 times per day Yong Erwin  mL/hr at 09/20/23 0513 1,000 mg at 09/20/23 0513   • fentaNYL (SUBLIMAZE) injection 25 mcg  25 mcg Intravenous Q12H PRN Garett Yun MD   25 mcg at 09/18/23 0259   • [Held by provider] docusate sodium-sennosides (SENOKOT S) 50-8.6 MG 1 tablet  1 tablet Per G Tube Nightly Esperanza Lambert MD   1 tablet at 09/09/23 2141   • [Held by provider] polyethylene glycol (MIRALAX) packet 17 g  17 g Oral Daily Esperanza Lambert MD       • metroNIDAZOLE (FLAGYL) premix IVPB 500 mg  500 mg Intravenous 3 times per day Amaya Hobbs  mL/hr at 09/20/23 0514 500 mg at 09/20/23 0514   • insulin lispro (ADMELOG,HumaLOG) - Correction Dose   Subcutaneous 4 times per day Isatu Doll NP   2 Units at 09/11/23 1752   • dextrose 50 % injection 25 g  25 g Intravenous PRN Isatu Doll NP       • dextrose 50 % injection 12.5 g  12.5 g Intravenous PRN Isatu Doll NP       • albuterol (VENTOLIN) nebulizer 2.5 mg  2.5 mg Nebulization Q6H Resp PRN Madison Power APNP   2.5 mg at 09/18/23 2314   • lipase-protease-amylase 10,440-39,150-39,150 units (VIOKACE) per tablet 2 tablet  2 tablet Per G Tube PRN Antoinette Kurtz CNP        And   • sodium bicarbonate tablet 650 mg  650 mg Per G Tube PRN Antoinette Kurtz CNP       • Potassium Standard Replacement Protocol (Levels 3.5 and lower)   Does not apply See Admin Instructions Luis Wahl MD       • Magnesium  Standard Replacement Protocol   Does not apply See Admin Instructions Luis Wahl MD       • Phosphorus Standard Replacement Protocol   Does not apply See Admin Instructions Luis Wahl MD       • VANCOMYCIN - PHARMACIST MONITORED Misc   Does not apply See Admin Instructions Michael Rothman MD       • sodium chloride 0.9% infusion   Intravenous Continuous PRN Michael Rothman MD   Paused at 09/08/23 2125   • sodium chloride 0.9 % flush bag 25 mL  25 mL Intravenous PRN Michael Rothman MD   Completed at 09/06/23 2313   • chlorhexidine gluconate (PERIDEX) 0.12 % solution 15 mL  15 mL Swish & Spit 2 times per day Michael Rothman MD   15 mL at 09/20/23 0830    And   • chlorhexidine gluconate (PERIDEX) 0.12 % solution 15 mL  15 mL Swish & Spit PRN Michael Rothman MD   15 mL at 09/17/23 1119   • sodium chloride (PF) 0.9 % injection 10 mL  10 mL Injection PRN Michael Rothman MD   10 mL at 08/31/23 0826   • sodium chloride (PF) 0.9 % injection 10 mL  10 mL Injection 2 times per day Michael Rothman MD   10 mL at 09/20/23 0832   • sodium chloride (PF) 0.9 % injection 10 mL  10 mL Injection 2 times per day Michael Rothman MD   10 mL at 09/20/23 0831   • sodium chloride (PF) 0.9 % injection 20 mL  20 mL Injection PRN Michael Rothman MD   20 mL at 08/31/23 0817   • sodium citrate anticoagulant 4 % flush 3 mL  3 mL Intracatheter PRN Yazan Carrizales DO   3.2 mL at 09/01/23 0747   • sodium chloride 0.9 % flush bag 25 mL  25 mL Intravenous PRN Michael Rothman MD       • sodium chloride (PF) 0.9 % injection 2 mL  2 mL Intracatheter 2 times per day Michael Rothman MD   2 mL at 09/20/23 0832       Vitals:  Vitals with min/max:      Vital Last Value 24 Hour Range   Temperature 98.2 °F (36.8 °C) (09/20/23 0835) Temp  Min: 97.5 °F (36.4 °C)  Max: 98.4 °F (36.9 °C)   Pulse 73 (09/20/23 0835) Pulse  Min: 60  Max: 80   Respiratory (!) 21 (09/20/23 0835) Resp  Min: 17  Max: 21   Non-Invasive  Blood  Pressure (!) 169/86 (09/20/23 0835) BP  Min: 146/75  Max: 169/86   Pulse Oximetry 97 % (09/20/23 0835) SpO2  Min: 97 %  Max: 100 %   Arterial   Blood Pressure 91/68 (09/05/23 1500) No data recorded              Mental Status Exam:     APPEARANCE: appears stated age, fair grooming/hygiene, no acute distress      BEHAVIOR:  calm, engaged, cooperative with interview. Fair eye contact.    SPEECH: RRR, normal volume, normal tone.  Spontaneous speech without stuttering/stammering.    MOTOR: No PMR or PMA noted.  No tics/tremors or other abnormal motor movements visible.    MOOD: Anxious    AFFECT: Congruent    THOUGHT PROCESS: Linear, logical, goal directed in conversation towards treatment    THOUGHT CONTENT: Patient denies SI/HI, paranoia, or delusions.    PERCEPTIONS: Denies AVH, does not appear to be responding to internal stimuli    INSIGHT: Fair    JUDGMENT: Fair    COGNITION: A and Ox3, fair concentration, with intact short and long term memory     Assessment:     Eben Roberts is a 68 year old male with a past psychiatric history of bipolar disorder, ADHD and a past medical history significant for hypertension, MRSA PNA in July 2023 that led to chronic respiratory failure requiring trach/PEG, EUGEINO who has been admitted to the ICU after transfer from SICU after requiring ex lap for peritonitis causing septic shock. Psychiatry consulted due to concerns of worsening depression.  Upon assessment, patient reports lower mood and heightened anxiety over the past several months due to being in and out of hospitals, worsening medical conditions.  Patient is on Abilify 10 mg for history of bipolar disorder which patient is tolerating well.  Patient is agreeable to start Remeron at this time for sleep, mood.  No acute safety concerns.     Impression:     Adjustment disorder with anxious distress    Bipolar 2 disorder, current episode depressed     Plan:   • Suicide precautions not required, no one to one sitter necessary     • Scheduled meds:  ? Abilify to 15 mg per G-tube  ? Remeron 30 mg nightly per G-tube for mood, insomnia.  ? Discontinue melatonin which has been ineffective for insomnia  ? Start trazodone 50 mg p.o. nightly titrate up to optimal dose   • PRN meds:  ? Zyprexa 2.5 mg p.o./IM 3 times daily as needed for acute anxiety/agitation     Moderate medical decision make utilized in patient visit today

## 2023-09-28 NOTE — ADDENDUM NOTE
Addended by: BIMAL HARDIN on: 4/17/2023 01:15 PM     Modules accepted: Orders     Graft Cartilage Fenestration Text: The cartilage was fenestrated with a 2mm punch biopsy to help facilitate graft survival and healing.

## 2023-10-25 NOTE — ASSESSMENT & PLAN NOTE
- Chronic, no acute symptoms at this time. If he develops localized RUQ pain, suggest u/s vs HIDA.   Continue with meds  Advise diet and exercise  See orders  followup prn and yearly   none

## 2023-11-06 NOTE — OR NURSING
Email to Brian Leonard Kristin C, Savina L (surgical leadership):  Patient reports he does not have a ride home after his bone marrow biopsy, plans for [facility] to provide his ride or to drive home himself; does not have a check in time.  left with Dr Sims's office and [facility] scheduling to notify.   
No

## 2023-11-07 NOTE — OR NURSING
Called and spoke to patient regarding concern for ride home and check-in tomorrow. Patient is working on coordinating a ride home with his wife. If patient is unable to coordinate ride with wife, we can allow for a cab voucher as patient needs this procedure. Patient also educated on arrival time of 1000 for 1200 procedure.

## 2023-11-08 NOTE — PROCEDURES
Bone Marrow Biopsy/Aspiration    Date/Time: 11/8/2023 12:30 PM    Performed by: Ingrid Zambrano M.D.  Authorized by: Ingrid Zambrano M.D.    Consent:     Consent obtained:  Verbal    Consent given by:  Patient    Risks discussed:  Bleeding, infection and pain    Alternatives discussed:  No treatment  Universal protocol:     Procedure explained and questions answered to patient or proxy's satisfaction: yes      Relevant documents present and verified: yes      Test results available and properly labeled: yes      Imaging studies available: yes      Required blood products, implants, devices, and special equipment available: yes      Immediately prior to procedure a time out was called: yes      Site/side marked: yes      Patient identity confirmed:  Verbally with patient  Pre-procedure details:     Procedure type:  Aspiration and biopsy    Requesting physician:  Dr Sims    Indications:  MPF disorder    Position:  Prone    Buttock laterality:  Left    Local anesthetic:  1% Lidocaine    Subcutaneous volume:  1 mL    Periosteum anesthetic volume:  4 mL    Preparation: Patient was prepped and draped in usual sterile fashion    Sedation:     Patient Sedated: No    Procedure details:     Aspirate obtained:  5 mL followed by 5 mL    Biopsy performed:  1 core    Number of attempts:  1  Post-procedure:     Puncture site:  Adhesive bandage applied and direct pressure applied    Patient tolerance of procedure:  Tolerated well, no immediate complications

## 2023-11-08 NOTE — OR NURSING
1055 Pt starting to feel very dizzy, lightheaded.  Pt admits to taking insulin this AM.  Pt has been NPO since 0200.  Texted Dr. Zambrano for orders.   1114 Pt remains very symptomatic, even with blood sugar of 90. Orders per MD for hypoglycemia protocol.  IV Dextrose 10% given as ordered.

## 2023-11-08 NOTE — DISCHARGE INSTRUCTIONS
BONE MARROW ASPIRATION & BIOPSY DISCHARGE INSTRUCTIONS    After the numbing medicine wears off, you may feel some discomfort.    Keep bandage clean and dry for 24 hours.  After this time, you can change the bandage.  You may now bathe or shower.    If bleeding occurs after your bone marrow aspiration or biopsy, apply pressure to the area and call your doctor immediately.    Call your doctor if pain persists for greater than 24 hours in the area where you had your aspiration or biopsy.    Call your doctor immediately if you notice redness or drainage in the area or if you have a fever.    Call your doctor if you have numbness or weakness in the area where the doctor took the bone marrow or down your leg.    Resume your regular diet.    Follow up with your doctor.    Additional instructions: Biopsies with result in 1-2 weeks.     I acknowledge receipt and understanding of these Home Care Instructions.    If any questions arise, call your provider.  If your provider is not available, please feel free to call the Surgical Center at [phone].    MEDICATIONS: Resume taking daily medication.  Take prescribed pain medication with food.  If no medication is prescribed, you may take non-aspirin pain medication if needed.  PAIN MEDICATION CAN BE VERY CONSTIPATING.  Take a stool softener or laxative such as senokot, pericolace, or milk of magnesia if needed.

## 2023-11-08 NOTE — TELEPHONE ENCOUNTER
Caller: Charla - same day surgery [facility]     Office Name, phone number, fax number:   Same day surgery - [facility]  Fax:[phone]  Phone:[phone]    Fax clearance to [phone]  Procedure Name: BONE MARROW BIOPSY AND ASPIRATION - DR. ANDERSON    Nurse sedation procedure    Procedure Scheduled Date: 11/8/23 - 12pm     Charla would like to know if patient needs clearance.    Callback Number: :[phone]     Thank you   Modesta ZAMORA

## 2023-11-08 NOTE — OR NURSING
1231- Patient arrived in PACU II alert and oriented. Report received from Fanny JORGENSEN.Vital signs are within normal limits for the patient. Patient reports no. Dressing is clean dry, and intact on left hip. Discussed discharge plan of care and patient expressed understanding. All needs met at this time.    1234 Daughter Aretha notified about patient status and plan of care.Discharge instructions covered with Aretha, verbalizes understanding. All questions answered at this time.     1238 Patient up to restroom. Voided without complication.    1242 Patient tolerating oral fluids well and eating crackers.     1247 MD gave okay for patient to take a [employer] home to meet wife. [employer] called.    1250 Blood sugar checked and documented at 110.    1303 IV removed.     1310 Pt escorted out of department in wheelchair with all belongings. Discharged home to responsible adult.

## 2023-11-09 NOTE — TELEPHONE ENCOUNTER
"Last OV: 04/03/2023  Proposed Surgery: BONE MARROW BIOPSY AND ASPIRATION   Surgery Date: 11/8/2023  Requesting Office Name: [facility]-Same day surgery   Fax Number: [phone]  Preference of Location (default is surgery center unless specified by Cardiologist or [facility])  Prior Clearance Addressed: No      Anticoags/Antiplatelets: Apixaban   Outstanding Cardiac Imaging : No  Stent, Cardiac Devices, or Catheterization: Yes  Within the last 6 months. Forward to provider to review.  Ablation, TAVR/Valve (including open heart), Cardioversion: No  Recent Cardiac Hospitalization: Yes  Date:  05/08/23            When: Hospitalized in the last 6 months. Forward to provider to review.   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    Agent orange exposure     Anesthesia     resistant to pain meds and \"numbing medications\"    Cancer ([facility])     polycythemia vera    Diabetes ([facility])     insulin and oral meds    Family history of punctured lung 2002    left lung    Heart attack ([facility]) 03/2017    Followed by [facility]    Hemorrhagic disorder ([facility])     on blood thinners    High cholesterol     Ischemic cardiomyopathy     Kidney stones     hx of kidney stones    Lupus ([facility]) 11/15/2019    Orthostatic hypotension 03/29/2018    Pain     headache pain    Polycythemia vera(238.4)     Stroke ([facility]) 2016    r/t afib; eye issues resolved afterward    Urinary bladder disorder     enlarged prostate, weak stream, difficulty urinating    Vertigo              Surgical Clearance Letter Sent: NO. Provider to advise.   **Scan clearance request letter into [facility].**    "

## 2023-11-13 NOTE — CARDIAC REMOTE MONITOR - SCAN
Device transmission reviewed. Device demonstrated appropriate function.       Electronically Signed by: Adin Neil M.D.    11/14/2023  9:43 AM

## 2023-11-21 NOTE — TELEPHONE ENCOUNTER
1. Name: Francesco Schulte      Call Back Number: There are no phone numbers on file.        How would the patient prefer to be contacted with a response: Phone call OK to leave a detailed message    Pt came in and said he needs a refill on:    -   tamsulosin (FLOMAX) 0.4 MG capsule     He said Pharmacy didn't want to fill because they don't show Dr CHAVEZ filling this in the past. Please call them and tell them he's ok for refill from her. Also if you can call him and let him know when its sent to Pharmacy

## 2023-12-05 NOTE — TELEPHONE ENCOUNTER
1. Name: Francesco Schulte      Call Back Number: [phone]         How would the patient prefer to be contacted with a response: Phone call OK to leave a detailed message    Pt Bob came in person. Gave paperwork for María. He said he normally brings this each year for Dr CHAVEZ to complete. I advised him I wiuld leave paper work on MA's desk and if a appt is needed he will be called. He said he has never needed a appt in the past. I left on [facility] desk    Thank you

## 2023-12-19 PROBLEM — T83.511A CATHETER-ASSOCIATED URINARY TRACT INFECTION (HCC): Status: RESOLVED | Noted: 2022-08-09 | Resolved: 2023-01-01

## 2023-12-19 PROBLEM — N39.0 CATHETER-ASSOCIATED URINARY TRACT INFECTION (HCC): Status: RESOLVED | Noted: 2022-08-09 | Resolved: 2023-01-01

## 2023-12-19 PROBLEM — B35.1 ONYCHOMYCOSIS: Status: ACTIVE | Noted: 2023-01-01

## 2023-12-19 NOTE — PROGRESS NOTES
"Subjective:     CC: No chief complaint on file.      Diagnoses of Type 2 diabetes mellitus with hyperglycemia, with long-term current use of insulin (HCC), Polycythemia vera (HCC), Dyslipidemia, Essential hypertension, Vitamin D deficiency, Paroxysmal A-fib (HCC), Thrombocytosis, and Hypercoagulable state (HCC) were pertinent to this visit.    HPI: Francesco is a pleasant 73 y.o. male who presents today for Eliquis form completion.    Problem   Onychomycosis    Chronic problem, patient is not interested in treatment or referral to podiatry at this time.  Advised to follow-up if symptoms worsen or fail to improve.  Patient understands and is crucial to keep food toenail hygiene due to his diabetes and poor circulation.      Polycythemia Vera (Hcc)    Chronic, stable, most recent bone marrow biopsy consistent with polycythemia vera.   Current regimen:was on Hydroxy  She follows with hematology.      Type 2 Diabetes Mellitus With Hyperglycemia (Hcc)    This is a chronic condition uncontrolled, managed by endocrinology Dr. Mckeon, patient has not seen him for over a year now. His current regimen includes Tresiba `12 Units daily and Farxiga 0.5 tablet daily  Today's A1c is pending.    Lab Results   Component Value Date/Time    HBA1C 8.0 (H) 08/02/2023 09:57 AM        Lab Results   Component Value Date/Time    HBA1C 7.2 (H) 04/04/2023 06:07 PM          Lab Results   Component Value Date/Time    HBA1C 8.4 (H) 07/06/2022 10:25 AM      Last diabetic foot exam: 07/26/2022  Last retinal eye exam: POCT retinal screen in office.  ACEi/ARB: Lisinopril, on Farxyga   Statin: intolerant   Aspirin: no, on Eliquis  Concomitant HTN: no                Past Medical History:   Diagnosis Date    Acute bacterial endocarditis 11/02/2021    Agent orange exposure     Anesthesia     resistant to pain meds and \"numbing medications\"    Cancer (HCC)     polycythemia vera    Catheter-associated urinary tract infection (HCC) 08/09/2022    This is a " new problem, patient has had urinary catheter inserted in emergency room 4 weeks ago due to urinary retention.  He has been experiencing subjective fever, right flank pain.  He tells me he has experienced similar symptoms are when he was diagnosed with urinary tract infection last time.          Diabetes ([facility])     insulin and oral meds    Family history of punctured lung 2002    left lung    Heart attack ([facility]) 03/2017    Followed by [facility]    Hemorrhagic disorder ([facility])     on blood thinners    High cholesterol     Ischemic cardiomyopathy     Kidney stones     hx of kidney stones    Lupus ([facility]) 11/15/2019    Orthostatic hypotension 03/29/2018    Pain     headache pain    Polycythemia vera(238.4)     Stroke ([facility]) 2016    r/t afib; eye issues resolved afterward    Urinary bladder disorder     enlarged prostate, weak stream, difficulty urinating    Vertigo        Current Outpatient Medications Ordered in Epic   Medication Sig Dispense Refill    apixaban (ELIQUIS) 5mg Tab Take 1 Tablet by mouth 2 times a day. 180 Tablet 3    finasteride (PROSCAR) 5 MG Tab TAKE 1 TABLET BY MOUTH EVERY DAY 90 Tablet 1    tamsulosin (FLOMAX) 0.4 MG capsule Take 1 Capsule by mouth every day. 90 Capsule 1    dicyclomine (BENTYL) 10 MG Cap TAKE 1 CAPSULE BY MOUTH EVERY 6 HOURS AS NEEDED (ABDOMINAL CRAMPS). 360 Capsule 3    ondansetron (ZOFRAN ODT) 4 MG TABLET DISPERSIBLE Take 1 Tablet by mouth every four hours as needed for Nausea/Vomiting (give PO if IV route is unavailable.). 10 Tablet 0    apixaban (ELIQUIS) 5mg Tab Take 1 Tablet by mouth 2 times a day. 180 Tablet 0    Insulin Degludec (TRESIBA FLEXTOUCH) 100 UNIT/ML Solution Pen-injector Inject 10 Units under the skin at bedtime. 15 mL 6    dapagliflozin propanediol (FARXIGA) 10 MG Tab Take 0.5 Tablets by mouth every day. 45 Tablet 1    hydroxyurea (HYDREA) 500 MG Cap Take 500 mg by mouth 2 Times a Day. (Patient not taking: Reported on 11/6/2023)       No current Epic-ordered  "facility-administered medications on file.     Review of Systems   Respiratory:  Positive for shortness of breath (chronic).    Cardiovascular:  Negative for chest pain.   All other systems reviewed and are negative.    Objective:     Exam:  /82 (BP Location: Left arm, Patient Position: Sitting, BP Cuff Size: Adult)   Pulse 86   Temp 36.2 °C (97.1 °F) (Temporal)   Ht 1.88 m (6' 2\")   Wt 84.4 kg (186 lb)   SpO2 93%   BMI 23.88 kg/m²  Body mass index is 23.88 kg/m².    Physical Exam  Constitutional:       Appearance: Normal appearance.   Cardiovascular:      Rate and Rhythm: Normal rate and regular rhythm.      Pulses: Normal pulses.      Heart sounds: Normal heart sounds. No murmur heard.  Pulmonary:      Effort: Pulmonary effort is normal. No respiratory distress.      Breath sounds: Normal breath sounds.   Neurological:      Mental Status: He is alert and oriented to person, place, and time.   Psychiatric:         Mood and Affect: Mood normal.       Assessment & Plan:   Francesco  is a pleasant 73 y.o. male with the following -     Problem List Items Addressed This Visit       Paroxysmal A-fib (HCC) (Chronic)    Relevant Medications    apixaban (ELIQUIS) 5mg Tab    Type 2 diabetes mellitus with hyperglycemia (HCC) (Chronic)    Relevant Orders    Diabetic Monofilament LE Exam (Completed)    POCT  A1C    HEMOGLOBIN A1C    Dyslipidemia    Relevant Orders    Lipid Profile    Essential hypertension    Relevant Medications    apixaban (ELIQUIS) 5mg Tab    Other Relevant Orders    CBC WITH DIFFERENTIAL    Comp Metabolic Panel    TSH WITH REFLEX TO FT4    Hypercoagulable state (HCC)    Relevant Medications    apixaban (ELIQUIS) 5mg Tab    Polycythemia vera (HCC)    Thrombocytosis    Relevant Medications    apixaban (ELIQUIS) 5mg Tab     Other Visit Diagnoses       Vitamin D deficiency        Relevant Orders    VITAMIN D,25 HYDROXY (DEFICIENCY)          Return in about 3 months (around 3/19/2024), or if symptoms " worsen or fail to improve.    Please note that this dictation was created using voice recognition software. I have made every reasonable attempt to correct obvious errors, but I expect that there are errors of grammar and possibly content that I did not discover before finalizing the note.

## 2024-01-01 ENCOUNTER — APPOINTMENT (OUTPATIENT)
Dept: RADIOLOGY | Facility: MEDICAL CENTER | Age: 74
DRG: 065 | End: 2024-01-01
Attending: EMERGENCY MEDICINE
Payer: MEDICARE

## 2024-01-01 ENCOUNTER — HOSPITAL ENCOUNTER (INPATIENT)
Facility: MEDICAL CENTER | Age: 74
LOS: 3 days | DRG: 065 | End: 2024-02-05
Attending: EMERGENCY MEDICINE | Admitting: HOSPITALIST
Payer: MEDICARE

## 2024-01-01 ENCOUNTER — HOME CARE VISIT (OUTPATIENT)
Dept: HOSPICE | Facility: HOSPICE | Age: 74
End: 2024-01-01
Payer: MEDICARE

## 2024-01-01 ENCOUNTER — PHARMACY VISIT (OUTPATIENT)
Dept: PHARMACY | Facility: MEDICAL CENTER | Age: 74
End: 2024-01-01
Payer: COMMERCIAL

## 2024-01-01 ENCOUNTER — APPOINTMENT (OUTPATIENT)
Dept: SPEECH THERAPY | Facility: REHABILITATION | Age: 74
DRG: 057 | End: 2024-01-01
Attending: PHYSICAL MEDICINE & REHABILITATION
Payer: MEDICARE

## 2024-01-01 ENCOUNTER — APPOINTMENT (OUTPATIENT)
Dept: OCCUPATIONAL THERAPY | Facility: REHABILITATION | Age: 74
DRG: 057 | End: 2024-01-01
Attending: PHYSICAL MEDICINE & REHABILITATION
Payer: MEDICARE

## 2024-01-01 ENCOUNTER — APPOINTMENT (OUTPATIENT)
Dept: PHYSICAL THERAPY | Facility: REHABILITATION | Age: 74
DRG: 057 | End: 2024-01-01
Attending: PHYSICAL MEDICINE & REHABILITATION
Payer: MEDICARE

## 2024-01-01 ENCOUNTER — ANCILLARY PROCEDURE (OUTPATIENT)
Dept: CARDIOLOGY | Facility: REHABILITATION | Age: 74
DRG: 057 | End: 2024-01-01
Attending: PHYSICAL MEDICINE & REHABILITATION
Payer: MEDICARE

## 2024-01-01 ENCOUNTER — OFFICE VISIT (OUTPATIENT)
Dept: INFECTIOUS DISEASES | Facility: MEDICAL CENTER | Age: 74
End: 2024-01-01
Attending: NURSE PRACTITIONER
Payer: MEDICARE

## 2024-01-01 ENCOUNTER — HOSPITAL ENCOUNTER (OUTPATIENT)
Dept: CARDIOLOGY | Facility: MEDICAL CENTER | Age: 74
End: 2024-01-25
Attending: INTERNAL MEDICINE
Payer: MEDICARE

## 2024-01-01 ENCOUNTER — HOSPITAL ENCOUNTER (OUTPATIENT)
Dept: LAB | Facility: MEDICAL CENTER | Age: 74
End: 2024-01-04
Attending: INTERNAL MEDICINE
Payer: MEDICARE

## 2024-01-01 ENCOUNTER — HOSPICE ADMISSION (OUTPATIENT)
Dept: HOSPICE | Facility: HOSPICE | Age: 74
End: 2024-01-01
Payer: MEDICARE

## 2024-01-01 ENCOUNTER — APPOINTMENT (OUTPATIENT)
Dept: RADIOLOGY | Facility: MEDICAL CENTER | Age: 74
DRG: 065 | End: 2024-01-01
Attending: INTERNAL MEDICINE
Payer: MEDICARE

## 2024-01-01 ENCOUNTER — APPOINTMENT (OUTPATIENT)
Dept: OCCUPATIONAL THERAPY | Facility: REHABILITATION | Age: 74
End: 2024-01-01
Attending: PHYSICAL MEDICINE & REHABILITATION
Payer: MEDICARE

## 2024-01-01 ENCOUNTER — OFFICE VISIT (OUTPATIENT)
Dept: CARDIOLOGY | Facility: MEDICAL CENTER | Age: 74
End: 2024-01-01
Attending: INTERNAL MEDICINE
Payer: MEDICARE

## 2024-01-01 ENCOUNTER — APPOINTMENT (OUTPATIENT)
Dept: RADIOLOGY | Facility: REHABILITATION | Age: 74
DRG: 057 | End: 2024-01-01
Attending: PHYSICAL MEDICINE & REHABILITATION
Payer: MEDICARE

## 2024-01-01 ENCOUNTER — HOSPITAL ENCOUNTER (OUTPATIENT)
Dept: RADIOLOGY | Facility: MEDICAL CENTER | Age: 74
End: 2024-01-27
Attending: INTERNAL MEDICINE
Payer: MEDICARE

## 2024-01-01 ENCOUNTER — HOSPITAL ENCOUNTER (INPATIENT)
Facility: REHABILITATION | Age: 74
LOS: 11 days | DRG: 057 | End: 2024-02-16
Attending: PHYSICAL MEDICINE & REHABILITATION | Admitting: PHYSICAL MEDICINE & REHABILITATION
Payer: MEDICARE

## 2024-01-01 ENCOUNTER — TELEPHONE (OUTPATIENT)
Dept: INFECTIOUS DISEASES | Facility: MEDICAL CENTER | Age: 74
End: 2024-01-01
Payer: MEDICARE

## 2024-01-01 VITALS — SYSTOLIC BLOOD PRESSURE: 115 MMHG | RESPIRATION RATE: 18 BRPM | DIASTOLIC BLOOD PRESSURE: 75 MMHG | HEART RATE: 68 BPM

## 2024-01-01 VITALS
WEIGHT: 178 LBS | BODY MASS INDEX: 23.59 KG/M2 | RESPIRATION RATE: 18 BRPM | TEMPERATURE: 98.2 F | HEART RATE: 83 BPM | SYSTOLIC BLOOD PRESSURE: 128 MMHG | DIASTOLIC BLOOD PRESSURE: 73 MMHG | OXYGEN SATURATION: 96 % | HEIGHT: 73 IN

## 2024-01-01 VITALS
HEIGHT: 74 IN | DIASTOLIC BLOOD PRESSURE: 86 MMHG | RESPIRATION RATE: 12 BRPM | BODY MASS INDEX: 23.87 KG/M2 | HEART RATE: 75 BPM | OXYGEN SATURATION: 99 % | WEIGHT: 186 LBS | SYSTOLIC BLOOD PRESSURE: 120 MMHG

## 2024-01-01 VITALS
RESPIRATION RATE: 17 BRPM | SYSTOLIC BLOOD PRESSURE: 144 MMHG | TEMPERATURE: 97.7 F | BODY MASS INDEX: 25.12 KG/M2 | HEART RATE: 75 BPM | DIASTOLIC BLOOD PRESSURE: 89 MMHG | WEIGHT: 195.77 LBS | HEIGHT: 74 IN | OXYGEN SATURATION: 99 %

## 2024-01-01 VITALS
SYSTOLIC BLOOD PRESSURE: 104 MMHG | DIASTOLIC BLOOD PRESSURE: 74 MMHG | WEIGHT: 175 LBS | HEART RATE: 71 BPM | TEMPERATURE: 97.2 F | HEIGHT: 75 IN | BODY MASS INDEX: 21.76 KG/M2 | OXYGEN SATURATION: 96 % | RESPIRATION RATE: 16 BRPM

## 2024-01-01 VITALS
RESPIRATION RATE: 14 BRPM | HEART RATE: 81 BPM | OXYGEN SATURATION: 95 % | HEIGHT: 74 IN | DIASTOLIC BLOOD PRESSURE: 68 MMHG | BODY MASS INDEX: 22.85 KG/M2 | SYSTOLIC BLOOD PRESSURE: 100 MMHG

## 2024-01-01 VITALS — HEART RATE: 68 BPM | SYSTOLIC BLOOD PRESSURE: 146 MMHG | RESPIRATION RATE: 16 BRPM | DIASTOLIC BLOOD PRESSURE: 68 MMHG

## 2024-01-01 VITALS — RESPIRATION RATE: 19 BRPM

## 2024-01-01 DIAGNOSIS — I25.5 ISCHEMIC CARDIOMYOPATHY: ICD-10-CM

## 2024-01-01 DIAGNOSIS — N40.0 BENIGN PROSTATIC HYPERPLASIA WITHOUT LOWER URINARY TRACT SYMPTOMS: ICD-10-CM

## 2024-01-01 DIAGNOSIS — I50.812 CHRONIC RIGHT-SIDED CONGESTIVE HEART FAILURE (HCC): ICD-10-CM

## 2024-01-01 DIAGNOSIS — E11.65 TYPE 2 DIABETES MELLITUS WITH HYPERGLYCEMIA, WITH LONG-TERM CURRENT USE OF INSULIN (HCC): Chronic | ICD-10-CM

## 2024-01-01 DIAGNOSIS — Z79.4 TYPE 2 DIABETES MELLITUS WITH HYPERGLYCEMIA, WITH LONG-TERM CURRENT USE OF INSULIN (HCC): Chronic | ICD-10-CM

## 2024-01-01 DIAGNOSIS — M86.9 OSTEOMYELITIS OF SECOND TOE OF RIGHT FOOT (HCC): ICD-10-CM

## 2024-01-01 DIAGNOSIS — Z95.5 HISTORY OF HEART ARTERY STENT: ICD-10-CM

## 2024-01-01 DIAGNOSIS — R07.89 OTHER CHEST PAIN: ICD-10-CM

## 2024-01-01 DIAGNOSIS — I50.20 HFREF (HEART FAILURE WITH REDUCED EJECTION FRACTION) (HCC): Primary | ICD-10-CM

## 2024-01-01 DIAGNOSIS — F03.90 DEMENTIA, UNSPECIFIED DEMENTIA SEVERITY, UNSPECIFIED DEMENTIA TYPE, UNSPECIFIED WHETHER BEHAVIORAL, PSYCHOTIC, OR MOOD DISTURBANCE OR ANXIETY (HCC): ICD-10-CM

## 2024-01-01 DIAGNOSIS — I50.9 CHF (NYHA CLASS II, ACC/AHA STAGE C) (HCC): ICD-10-CM

## 2024-01-01 DIAGNOSIS — I50.20 HFREF (HEART FAILURE WITH REDUCED EJECTION FRACTION) (HCC): ICD-10-CM

## 2024-01-01 DIAGNOSIS — I50.22 CHRONIC SYSTOLIC HEART FAILURE (HCC): ICD-10-CM

## 2024-01-01 DIAGNOSIS — I48.0 HYPERCOAGULABLE STATE DUE TO PAROXYSMAL ATRIAL FIBRILLATION (HCC): ICD-10-CM

## 2024-01-01 DIAGNOSIS — G81.00 FLACCID HEMIPLEGIA, UNSPECIFIED ETIOLOGY, UNSPECIFIED LATERALITY (HCC): ICD-10-CM

## 2024-01-01 DIAGNOSIS — M86.271 SUBACUTE OSTEOMYELITIS OF RIGHT FOOT (HCC): ICD-10-CM

## 2024-01-01 DIAGNOSIS — Z95.810 S/P ICD (INTERNAL CARDIAC DEFIBRILLATOR) PROCEDURE: ICD-10-CM

## 2024-01-01 DIAGNOSIS — Z78.9 STATIN INTOLERANCE: Chronic | ICD-10-CM

## 2024-01-01 DIAGNOSIS — I48.0 PAROXYSMAL A-FIB (HCC): ICD-10-CM

## 2024-01-01 DIAGNOSIS — I25.83 CORONARY ARTERY DISEASE DUE TO LIPID RICH PLAQUE: ICD-10-CM

## 2024-01-01 DIAGNOSIS — Z95.810 ICD (IMPLANTABLE CARDIOVERTER-DEFIBRILLATOR) IN PLACE: ICD-10-CM

## 2024-01-01 DIAGNOSIS — R10.32 LEFT LOWER QUADRANT ABDOMINAL PAIN: ICD-10-CM

## 2024-01-01 DIAGNOSIS — I25.10 CORONARY ARTERY DISEASE DUE TO LIPID RICH PLAQUE: ICD-10-CM

## 2024-01-01 DIAGNOSIS — Z51.5 HOSPICE CARE: Primary | ICD-10-CM

## 2024-01-01 DIAGNOSIS — I16.1 HYPERTENSIVE EMERGENCY: ICD-10-CM

## 2024-01-01 DIAGNOSIS — R53.1 RIGHT SIDED WEAKNESS: ICD-10-CM

## 2024-01-01 DIAGNOSIS — R07.9 CHEST PAIN, UNSPECIFIED TYPE: ICD-10-CM

## 2024-01-01 DIAGNOSIS — I48.0 PAROXYSMAL A-FIB (HCC): Chronic | ICD-10-CM

## 2024-01-01 DIAGNOSIS — D68.69 HYPERCOAGULABLE STATE DUE TO PAROXYSMAL ATRIAL FIBRILLATION (HCC): ICD-10-CM

## 2024-01-01 DIAGNOSIS — Z51.5 HOSPICE CARE: ICD-10-CM

## 2024-01-01 LAB
25(OH)D3 SERPL-MCNC: 27 NG/ML (ref 30–100)
ABO GROUP BLD: NORMAL
ALBUMIN SERPL BCP-MCNC: 3.3 G/DL (ref 3.2–4.9)
ALBUMIN SERPL BCP-MCNC: 3.4 G/DL (ref 3.2–4.9)
ALBUMIN SERPL BCP-MCNC: 3.6 G/DL (ref 3.2–4.9)
ALBUMIN SERPL BCP-MCNC: 4 G/DL (ref 3.2–4.9)
ALBUMIN/GLOB SERPL: 0.8 G/DL
ALP SERPL-CCNC: 68 U/L (ref 30–99)
ALP SERPL-CCNC: 73 U/L (ref 30–99)
ALP SERPL-CCNC: 85 U/L (ref 30–99)
ALT SERPL-CCNC: 10 U/L (ref 2–50)
ALT SERPL-CCNC: 11 U/L (ref 2–50)
ALT SERPL-CCNC: 6 U/L (ref 2–50)
ANION GAP SERPL CALC-SCNC: 10 MMOL/L (ref 7–16)
ANION GAP SERPL CALC-SCNC: 11 MMOL/L (ref 7–16)
ANION GAP SERPL CALC-SCNC: 11 MMOL/L (ref 7–16)
ANION GAP SERPL CALC-SCNC: 14 MMOL/L (ref 7–16)
ANION GAP SERPL CALC-SCNC: 9 MMOL/L (ref 7–16)
ANISOCYTOSIS BLD QL SMEAR: ABNORMAL
APPEARANCE UR: CLEAR
APTT PPP: 40.4 SEC (ref 24.7–36)
AST SERPL-CCNC: 12 U/L (ref 12–45)
AST SERPL-CCNC: 16 U/L (ref 12–45)
AST SERPL-CCNC: 19 U/L (ref 12–45)
BACTERIA #/AREA URNS HPF: NEGATIVE /HPF
BACTERIA UR CULT: NORMAL
BASOPHILS # BLD AUTO: 1.3 % (ref 0–1.8)
BASOPHILS # BLD AUTO: 1.5 % (ref 0–1.8)
BASOPHILS # BLD AUTO: 1.5 % (ref 0–1.8)
BASOPHILS # BLD AUTO: 1.7 % (ref 0–1.8)
BASOPHILS # BLD: 0.09 K/UL (ref 0–0.12)
BASOPHILS # BLD: 0.14 K/UL (ref 0–0.12)
BASOPHILS # BLD: 0.14 K/UL (ref 0–0.12)
BASOPHILS # BLD: 0.16 K/UL (ref 0–0.12)
BILIRUB SERPL-MCNC: 0.3 MG/DL (ref 0.1–1.5)
BILIRUB SERPL-MCNC: 0.6 MG/DL (ref 0.1–1.5)
BILIRUB SERPL-MCNC: 0.6 MG/DL (ref 0.1–1.5)
BILIRUB UR QL STRIP.AUTO: NEGATIVE
BLD GP AB SCN SERPL QL: NORMAL
BUN SERPL-MCNC: 19 MG/DL (ref 8–22)
BUN SERPL-MCNC: 20 MG/DL (ref 8–22)
BUN SERPL-MCNC: 22 MG/DL (ref 8–22)
BUN SERPL-MCNC: 22 MG/DL (ref 8–22)
BUN SERPL-MCNC: 23 MG/DL (ref 8–22)
BUN SERPL-MCNC: 29 MG/DL (ref 8–22)
CALCIUM ALBUM COR SERPL-MCNC: 10.4 MG/DL (ref 8.5–10.5)
CALCIUM ALBUM COR SERPL-MCNC: 10.9 MG/DL (ref 8.5–10.5)
CALCIUM ALBUM COR SERPL-MCNC: 9.5 MG/DL (ref 8.5–10.5)
CALCIUM ALBUM COR SERPL-MCNC: 9.6 MG/DL (ref 8.5–10.5)
CALCIUM SERPL-MCNC: 10.9 MG/DL (ref 8.4–10.2)
CALCIUM SERPL-MCNC: 9 MG/DL (ref 8.4–10.2)
CALCIUM SERPL-MCNC: 9.1 MG/DL (ref 8.5–10.5)
CALCIUM SERPL-MCNC: 9.1 MG/DL (ref 8.5–10.5)
CALCIUM SERPL-MCNC: 9.3 MG/DL (ref 8.5–10.5)
CALCIUM SERPL-MCNC: 9.8 MG/DL (ref 8.4–10.2)
CHLORIDE SERPL-SCNC: 101 MMOL/L (ref 96–112)
CHLORIDE SERPL-SCNC: 103 MMOL/L (ref 96–112)
CHLORIDE SERPL-SCNC: 103 MMOL/L (ref 96–112)
CHLORIDE SERPL-SCNC: 104 MMOL/L (ref 96–112)
CHLORIDE SERPL-SCNC: 106 MMOL/L (ref 96–112)
CHLORIDE SERPL-SCNC: 97 MMOL/L (ref 96–112)
CO2 SERPL-SCNC: 21 MMOL/L (ref 20–33)
CO2 SERPL-SCNC: 22 MMOL/L (ref 20–33)
CO2 SERPL-SCNC: 22 MMOL/L (ref 20–33)
CO2 SERPL-SCNC: 23 MMOL/L (ref 20–33)
CO2 SERPL-SCNC: 24 MMOL/L (ref 20–33)
CO2 SERPL-SCNC: 25 MMOL/L (ref 20–33)
COLOR UR: YELLOW
COMMENT 1642: NORMAL
CREAT SERPL-MCNC: 1 MG/DL (ref 0.5–1.4)
CREAT SERPL-MCNC: 1.05 MG/DL (ref 0.5–1.4)
CREAT SERPL-MCNC: 1.07 MG/DL (ref 0.5–1.4)
CREAT SERPL-MCNC: 1.16 MG/DL (ref 0.5–1.4)
CREAT SERPL-MCNC: 1.25 MG/DL (ref 0.5–1.4)
CREAT SERPL-MCNC: 1.26 MG/DL (ref 0.5–1.4)
EKG IMPRESSION: NORMAL
EOSINOPHIL # BLD AUTO: 0.09 K/UL (ref 0–0.51)
EOSINOPHIL # BLD AUTO: 0.12 K/UL (ref 0–0.51)
EOSINOPHIL # BLD AUTO: 0.43 K/UL (ref 0–0.51)
EOSINOPHIL # BLD AUTO: 0.55 K/UL (ref 0–0.51)
EOSINOPHIL NFR BLD: 1 % (ref 0–6.9)
EOSINOPHIL NFR BLD: 1.7 % (ref 0–6.9)
EOSINOPHIL NFR BLD: 4 % (ref 0–6.9)
EOSINOPHIL NFR BLD: 6.8 % (ref 0–6.9)
EPI CELLS #/AREA URNS HPF: NEGATIVE /HPF
ERYTHROCYTE [DISTWIDTH] IN BLOOD BY AUTOMATED COUNT: 65.5 FL (ref 35.9–50)
ERYTHROCYTE [DISTWIDTH] IN BLOOD BY AUTOMATED COUNT: 66.7 FL (ref 35.9–50)
ERYTHROCYTE [DISTWIDTH] IN BLOOD BY AUTOMATED COUNT: 67.7 FL (ref 35.9–50)
ERYTHROCYTE [DISTWIDTH] IN BLOOD BY AUTOMATED COUNT: 68.6 FL (ref 35.9–50)
ERYTHROCYTE [DISTWIDTH] IN BLOOD BY AUTOMATED COUNT: 69 FL (ref 35.9–50)
ERYTHROCYTE [DISTWIDTH] IN BLOOD BY AUTOMATED COUNT: 70.8 FL (ref 35.9–50)
ERYTHROCYTE [DISTWIDTH] IN BLOOD BY AUTOMATED COUNT: 73.2 FL (ref 35.9–50)
EST. AVERAGE GLUCOSE BLD GHB EST-MCNC: 123 MG/DL
EST. AVERAGE GLUCOSE BLD GHB EST-MCNC: 123 MG/DL
GFR SERPLBLD CREATININE-BSD FMLA CKD-EPI: 61 ML/MIN/1.73 M 2
GFR SERPLBLD CREATININE-BSD FMLA CKD-EPI: 61 ML/MIN/1.73 M 2
GFR SERPLBLD CREATININE-BSD FMLA CKD-EPI: 67 ML/MIN/1.73 M 2
GFR SERPLBLD CREATININE-BSD FMLA CKD-EPI: 74 ML/MIN/1.73 M 2
GFR SERPLBLD CREATININE-BSD FMLA CKD-EPI: 76 ML/MIN/1.73 M 2
GFR SERPLBLD CREATININE-BSD FMLA CKD-EPI: 80 ML/MIN/1.73 M 2
GIANT PLATELETS BLD QL SMEAR: NORMAL
GLOBULIN SER CALC-MCNC: 4.3 G/DL (ref 1.9–3.5)
GLOBULIN SER CALC-MCNC: 4.6 G/DL (ref 1.9–3.5)
GLOBULIN SER CALC-MCNC: 5.2 G/DL (ref 1.9–3.5)
GLUCOSE BLD STRIP.AUTO-MCNC: 100 MG/DL (ref 65–99)
GLUCOSE BLD STRIP.AUTO-MCNC: 103 MG/DL (ref 65–99)
GLUCOSE BLD STRIP.AUTO-MCNC: 104 MG/DL (ref 65–99)
GLUCOSE BLD STRIP.AUTO-MCNC: 104 MG/DL (ref 65–99)
GLUCOSE BLD STRIP.AUTO-MCNC: 108 MG/DL (ref 65–99)
GLUCOSE BLD STRIP.AUTO-MCNC: 109 MG/DL (ref 65–99)
GLUCOSE BLD STRIP.AUTO-MCNC: 109 MG/DL (ref 65–99)
GLUCOSE BLD STRIP.AUTO-MCNC: 111 MG/DL (ref 65–99)
GLUCOSE BLD STRIP.AUTO-MCNC: 115 MG/DL (ref 65–99)
GLUCOSE BLD STRIP.AUTO-MCNC: 116 MG/DL (ref 65–99)
GLUCOSE BLD STRIP.AUTO-MCNC: 119 MG/DL (ref 65–99)
GLUCOSE BLD STRIP.AUTO-MCNC: 123 MG/DL (ref 65–99)
GLUCOSE BLD STRIP.AUTO-MCNC: 125 MG/DL (ref 65–99)
GLUCOSE BLD STRIP.AUTO-MCNC: 125 MG/DL (ref 65–99)
GLUCOSE BLD STRIP.AUTO-MCNC: 127 MG/DL (ref 65–99)
GLUCOSE BLD STRIP.AUTO-MCNC: 129 MG/DL (ref 65–99)
GLUCOSE BLD STRIP.AUTO-MCNC: 129 MG/DL (ref 65–99)
GLUCOSE BLD STRIP.AUTO-MCNC: 131 MG/DL (ref 65–99)
GLUCOSE BLD STRIP.AUTO-MCNC: 133 MG/DL (ref 65–99)
GLUCOSE BLD STRIP.AUTO-MCNC: 135 MG/DL (ref 65–99)
GLUCOSE BLD STRIP.AUTO-MCNC: 135 MG/DL (ref 65–99)
GLUCOSE BLD STRIP.AUTO-MCNC: 136 MG/DL (ref 65–99)
GLUCOSE BLD STRIP.AUTO-MCNC: 137 MG/DL (ref 65–99)
GLUCOSE BLD STRIP.AUTO-MCNC: 138 MG/DL (ref 65–99)
GLUCOSE BLD STRIP.AUTO-MCNC: 138 MG/DL (ref 65–99)
GLUCOSE BLD STRIP.AUTO-MCNC: 141 MG/DL (ref 65–99)
GLUCOSE BLD STRIP.AUTO-MCNC: 142 MG/DL (ref 65–99)
GLUCOSE BLD STRIP.AUTO-MCNC: 142 MG/DL (ref 65–99)
GLUCOSE BLD STRIP.AUTO-MCNC: 144 MG/DL (ref 65–99)
GLUCOSE BLD STRIP.AUTO-MCNC: 147 MG/DL (ref 65–99)
GLUCOSE BLD STRIP.AUTO-MCNC: 152 MG/DL (ref 65–99)
GLUCOSE BLD STRIP.AUTO-MCNC: 161 MG/DL (ref 65–99)
GLUCOSE BLD STRIP.AUTO-MCNC: 162 MG/DL (ref 65–99)
GLUCOSE BLD STRIP.AUTO-MCNC: 165 MG/DL (ref 65–99)
GLUCOSE BLD STRIP.AUTO-MCNC: 165 MG/DL (ref 65–99)
GLUCOSE BLD STRIP.AUTO-MCNC: 167 MG/DL (ref 65–99)
GLUCOSE BLD STRIP.AUTO-MCNC: 168 MG/DL (ref 65–99)
GLUCOSE BLD STRIP.AUTO-MCNC: 171 MG/DL (ref 65–99)
GLUCOSE BLD STRIP.AUTO-MCNC: 188 MG/DL (ref 65–99)
GLUCOSE BLD STRIP.AUTO-MCNC: 194 MG/DL (ref 65–99)
GLUCOSE BLD STRIP.AUTO-MCNC: 194 MG/DL (ref 65–99)
GLUCOSE BLD STRIP.AUTO-MCNC: 200 MG/DL (ref 65–99)
GLUCOSE BLD STRIP.AUTO-MCNC: 204 MG/DL (ref 65–99)
GLUCOSE BLD STRIP.AUTO-MCNC: 205 MG/DL (ref 65–99)
GLUCOSE BLD STRIP.AUTO-MCNC: 211 MG/DL (ref 65–99)
GLUCOSE BLD STRIP.AUTO-MCNC: 213 MG/DL (ref 65–99)
GLUCOSE BLD STRIP.AUTO-MCNC: 85 MG/DL (ref 65–99)
GLUCOSE BLD STRIP.AUTO-MCNC: 95 MG/DL (ref 65–99)
GLUCOSE SERPL-MCNC: 113 MG/DL (ref 65–99)
GLUCOSE SERPL-MCNC: 118 MG/DL (ref 65–99)
GLUCOSE SERPL-MCNC: 148 MG/DL (ref 65–99)
GLUCOSE SERPL-MCNC: 149 MG/DL (ref 65–99)
GLUCOSE SERPL-MCNC: 149 MG/DL (ref 65–99)
GLUCOSE SERPL-MCNC: 99 MG/DL (ref 65–99)
GLUCOSE UR STRIP.AUTO-MCNC: NEGATIVE MG/DL
HBA1C MFR BLD: 5.9 % (ref 4–5.6)
HBA1C MFR BLD: 5.9 % (ref 4–5.6)
HCT VFR BLD AUTO: 47.5 % (ref 42–52)
HCT VFR BLD AUTO: 49.4 % (ref 42–52)
HCT VFR BLD AUTO: 49.8 % (ref 42–52)
HCT VFR BLD AUTO: 50.2 % (ref 42–52)
HCT VFR BLD AUTO: 50.8 % (ref 42–52)
HCT VFR BLD AUTO: 54.3 % (ref 42–52)
HCT VFR BLD AUTO: 55.7 % (ref 42–52)
HGB BLD-MCNC: 15.4 G/DL (ref 14–18)
HGB BLD-MCNC: 15.7 G/DL (ref 14–18)
HGB BLD-MCNC: 16 G/DL (ref 14–18)
HGB BLD-MCNC: 16.2 G/DL (ref 14–18)
HGB BLD-MCNC: 16.2 G/DL (ref 14–18)
HGB BLD-MCNC: 17.3 G/DL (ref 14–18)
HGB BLD-MCNC: 17.8 G/DL (ref 14–18)
HYALINE CASTS #/AREA URNS LPF: ABNORMAL /LPF
IMM GRANULOCYTES # BLD AUTO: 0.03 K/UL (ref 0–0.11)
IMM GRANULOCYTES # BLD AUTO: 0.04 K/UL (ref 0–0.11)
IMM GRANULOCYTES # BLD AUTO: 0.05 K/UL (ref 0–0.11)
IMM GRANULOCYTES # BLD AUTO: 0.1 K/UL (ref 0–0.11)
IMM GRANULOCYTES NFR BLD AUTO: 0.4 % (ref 0–0.9)
IMM GRANULOCYTES NFR BLD AUTO: 0.5 % (ref 0–0.9)
IMM GRANULOCYTES NFR BLD AUTO: 0.6 % (ref 0–0.9)
IMM GRANULOCYTES NFR BLD AUTO: 1.1 % (ref 0–0.9)
INR PPP: 1.55 (ref 0.87–1.13)
KETONES UR STRIP.AUTO-MCNC: NEGATIVE MG/DL
LEUKOCYTE ESTERASE UR QL STRIP.AUTO: ABNORMAL
LG PLATELETS BLD QL SMEAR: NORMAL
LV EJECT FRACT  99904: 20
LV EJECT FRACT MOD 2C 99903: 38.14
LV EJECT FRACT MOD 4C 99902: 28.39
LV EJECT FRACT MOD BP 99901: 34.49
LYMPHOCYTES # BLD AUTO: 0.53 K/UL (ref 1–4.8)
LYMPHOCYTES # BLD AUTO: 0.58 K/UL (ref 1–4.8)
LYMPHOCYTES # BLD AUTO: 0.65 K/UL (ref 1–4.8)
LYMPHOCYTES # BLD AUTO: 0.84 K/UL (ref 1–4.8)
LYMPHOCYTES NFR BLD: 5.4 % (ref 22–41)
LYMPHOCYTES NFR BLD: 7.7 % (ref 22–41)
LYMPHOCYTES NFR BLD: 8 % (ref 22–41)
LYMPHOCYTES NFR BLD: 9.2 % (ref 22–41)
MACROCYTES BLD QL SMEAR: ABNORMAL
MACROCYTES BLD QL SMEAR: ABNORMAL
MAGNESIUM SERPL-MCNC: 2 MG/DL (ref 1.5–2.5)
MCH RBC QN AUTO: 25.3 PG (ref 27–33)
MCH RBC QN AUTO: 25.9 PG (ref 27–33)
MCH RBC QN AUTO: 26 PG (ref 27–33)
MCH RBC QN AUTO: 26.3 PG (ref 27–33)
MCH RBC QN AUTO: 26.4 PG (ref 27–33)
MCH RBC QN AUTO: 26.7 PG (ref 27–33)
MCH RBC QN AUTO: 27.9 PG (ref 27–33)
MCHC RBC AUTO-ENTMCNC: 31.1 G/DL (ref 32.3–36.5)
MCHC RBC AUTO-ENTMCNC: 31.8 G/DL (ref 32.3–36.5)
MCHC RBC AUTO-ENTMCNC: 31.9 G/DL (ref 32.3–36.5)
MCHC RBC AUTO-ENTMCNC: 32.1 G/DL (ref 32.3–36.5)
MCHC RBC AUTO-ENTMCNC: 32.3 G/DL (ref 32.3–36.5)
MCHC RBC AUTO-ENTMCNC: 32.4 G/DL (ref 32.3–36.5)
MCHC RBC AUTO-ENTMCNC: 32.8 G/DL (ref 32.3–36.5)
MCV RBC AUTO: 79.7 FL (ref 81.4–97.8)
MCV RBC AUTO: 79.8 FL (ref 81.4–97.8)
MCV RBC AUTO: 81.4 FL (ref 81.4–97.8)
MCV RBC AUTO: 81.6 FL (ref 81.4–97.8)
MCV RBC AUTO: 82.7 FL (ref 81.4–97.8)
MCV RBC AUTO: 83.8 FL (ref 81.4–97.8)
MCV RBC AUTO: 86.9 FL (ref 81.4–97.8)
MICRO URNS: ABNORMAL
MICROCYTES BLD QL SMEAR: ABNORMAL
MICROCYTES BLD QL SMEAR: ABNORMAL
MONOCYTES # BLD AUTO: 0.46 K/UL (ref 0–0.85)
MONOCYTES # BLD AUTO: 0.49 K/UL (ref 0–0.85)
MONOCYTES # BLD AUTO: 0.49 K/UL (ref 0–0.85)
MONOCYTES # BLD AUTO: 0.5 K/UL (ref 0–0.85)
MONOCYTES NFR BLD AUTO: 4.5 % (ref 0–13.4)
MONOCYTES NFR BLD AUTO: 5.3 % (ref 0–13.4)
MONOCYTES NFR BLD AUTO: 6.2 % (ref 0–13.4)
MONOCYTES NFR BLD AUTO: 6.7 % (ref 0–13.4)
MORPHOLOGY BLD-IMP: NORMAL
NEUTROPHILS # BLD AUTO: 5.66 K/UL (ref 1.82–7.42)
NEUTROPHILS # BLD AUTO: 6.26 K/UL (ref 1.82–7.42)
NEUTROPHILS # BLD AUTO: 7.51 K/UL (ref 1.82–7.42)
NEUTROPHILS # BLD AUTO: 9.09 K/UL (ref 1.82–7.42)
NEUTROPHILS NFR BLD: 76.9 % (ref 44–72)
NEUTROPHILS NFR BLD: 81.9 % (ref 44–72)
NEUTROPHILS NFR BLD: 82 % (ref 44–72)
NEUTROPHILS NFR BLD: 84.1 % (ref 44–72)
NITRITE UR QL STRIP.AUTO: NEGATIVE
NRBC # BLD AUTO: 0 K/UL
NRBC BLD-RTO: 0 /100 WBC (ref 0–0.2)
NT-PROBNP SERPL IA-MCNC: 2554 PG/ML (ref 0–125)
NT-PROBNP SERPL IA-MCNC: 3703 PG/ML (ref 0–125)
OVALOCYTES BLD QL SMEAR: NORMAL
PH UR STRIP.AUTO: 5.5 [PH] (ref 5–8)
PHOSPHATE SERPL-MCNC: 3 MG/DL (ref 2.5–4.5)
PHOSPHATE SERPL-MCNC: 3.2 MG/DL (ref 2.5–4.5)
PLATELET # BLD AUTO: 387 K/UL (ref 164–446)
PLATELET # BLD AUTO: 389 K/UL (ref 164–446)
PLATELET # BLD AUTO: 411 K/UL (ref 164–446)
PLATELET # BLD AUTO: 419 K/UL (ref 164–446)
PLATELET # BLD AUTO: 469 K/UL (ref 164–446)
PLATELET # BLD AUTO: 528 K/UL (ref 164–446)
PLATELET # BLD AUTO: 528 K/UL (ref 164–446)
PLATELET BLD QL SMEAR: NORMAL
PMV BLD AUTO: 10.2 FL (ref 9–12.9)
PMV BLD AUTO: 10.3 FL (ref 9–12.9)
PMV BLD AUTO: 10.3 FL (ref 9–12.9)
PMV BLD AUTO: 10.5 FL (ref 9–12.9)
PMV BLD AUTO: 11 FL (ref 9–12.9)
PMV BLD AUTO: 9.2 FL (ref 9–12.9)
PMV BLD AUTO: 9.4 FL (ref 9–12.9)
POIKILOCYTOSIS BLD QL SMEAR: NORMAL
POLYCHROMASIA BLD QL SMEAR: NORMAL
POLYCHROMASIA BLD QL SMEAR: NORMAL
POTASSIUM SERPL-SCNC: 3.8 MMOL/L (ref 3.6–5.5)
POTASSIUM SERPL-SCNC: 3.9 MMOL/L (ref 3.6–5.5)
POTASSIUM SERPL-SCNC: 4 MMOL/L (ref 3.6–5.5)
POTASSIUM SERPL-SCNC: 4.5 MMOL/L (ref 3.6–5.5)
PROT SERPL-MCNC: 7.6 G/DL (ref 6–8.2)
PROT SERPL-MCNC: 8.2 G/DL (ref 6–8.2)
PROT SERPL-MCNC: 9.2 G/DL (ref 6–8.2)
PROT UR QL STRIP: 30 MG/DL
PROTHROMBIN TIME: 19.3 SEC (ref 12–14.6)
RBC # BLD AUTO: 5.73 M/UL (ref 4.7–6.1)
RBC # BLD AUTO: 5.95 M/UL (ref 4.7–6.1)
RBC # BLD AUTO: 6.14 M/UL (ref 4.7–6.1)
RBC # BLD AUTO: 6.15 M/UL (ref 4.7–6.1)
RBC # BLD AUTO: 6.2 M/UL (ref 4.7–6.1)
RBC # BLD AUTO: 6.65 M/UL (ref 4.7–6.1)
RBC # BLD AUTO: 6.67 M/UL (ref 4.7–6.1)
RBC # URNS HPF: ABNORMAL /HPF
RBC BLD AUTO: PRESENT
RBC UR QL AUTO: ABNORMAL
RH BLD: NORMAL
SCHISTOCYTES BLD QL SMEAR: NORMAL
SIGNIFICANT IND 70042: NORMAL
SITE SITE: NORMAL
SODIUM SERPL-SCNC: 134 MMOL/L (ref 135–145)
SODIUM SERPL-SCNC: 135 MMOL/L (ref 135–145)
SODIUM SERPL-SCNC: 137 MMOL/L (ref 135–145)
SODIUM SERPL-SCNC: 141 MMOL/L (ref 135–145)
SOURCE SOURCE: NORMAL
SP GR UR STRIP.AUTO: 1.02
TROPONIN T SERPL-MCNC: 21 NG/L (ref 6–19)
TROPONIN T SERPL-MCNC: 33 NG/L (ref 6–19)
TROPONIN T SERPL-MCNC: 53 NG/L (ref 6–19)
TROPONIN T SERPL-MCNC: 76 NG/L (ref 6–19)
TROPONIN T SERPL-MCNC: 79 NG/L (ref 6–19)
TROPONIN T SERPL-MCNC: 82 NG/L (ref 6–19)
TROPONIN T SERPL-MCNC: 83 NG/L (ref 6–19)
TSH SERPL DL<=0.005 MIU/L-ACNC: 2.74 UIU/ML (ref 0.38–5.33)
UROBILINOGEN UR STRIP.AUTO-MCNC: 0.2 MG/DL
WBC # BLD AUTO: 10.8 K/UL (ref 4.8–10.8)
WBC # BLD AUTO: 6.9 K/UL (ref 4.8–10.8)
WBC # BLD AUTO: 7.3 K/UL (ref 4.8–10.8)
WBC # BLD AUTO: 7.8 K/UL (ref 4.8–10.8)
WBC # BLD AUTO: 8.1 K/UL (ref 4.8–10.8)
WBC # BLD AUTO: 9.2 K/UL (ref 4.8–10.8)
WBC # BLD AUTO: 9.5 K/UL (ref 4.8–10.8)
WBC #/AREA URNS HPF: ABNORMAL /HPF

## 2024-01-01 PROCEDURE — G0299 HHS/HOSPICE OF RN EA 15 MIN: HCPCS

## 2024-01-01 PROCEDURE — A9270 NON-COVERED ITEM OR SERVICE: HCPCS | Performed by: PHYSICAL MEDICINE & REHABILITATION

## 2024-01-01 PROCEDURE — 93005 ELECTROCARDIOGRAM TRACING: CPT | Mod: XU | Performed by: INTERNAL MEDICINE

## 2024-01-01 PROCEDURE — 770010 HCHG ROOM/CARE - REHAB SEMI PRIVAT*

## 2024-01-01 PROCEDURE — S9126 HOSPICE CARE, IN THE HOME, P: HCPCS

## 2024-01-01 PROCEDURE — 97530 THERAPEUTIC ACTIVITIES: CPT

## 2024-01-01 PROCEDURE — 700102 HCHG RX REV CODE 250 W/ 637 OVERRIDE(OP): Performed by: HOSPITALIST

## 2024-01-01 PROCEDURE — 85025 COMPLETE CBC W/AUTO DIFF WBC: CPT

## 2024-01-01 PROCEDURE — A9270 NON-COVERED ITEM OR SERVICE: HCPCS | Performed by: HOSPITALIST

## 2024-01-01 PROCEDURE — 82962 GLUCOSE BLOOD TEST: CPT

## 2024-01-01 PROCEDURE — 84443 ASSAY THYROID STIM HORMONE: CPT

## 2024-01-01 PROCEDURE — 97110 THERAPEUTIC EXERCISES: CPT

## 2024-01-01 PROCEDURE — 99215 OFFICE O/P EST HI 40 MIN: CPT | Mod: 25 | Performed by: INTERNAL MEDICINE

## 2024-01-01 PROCEDURE — 73630 X-RAY EXAM OF FOOT: CPT | Mod: RT

## 2024-01-01 PROCEDURE — 700111 HCHG RX REV CODE 636 W/ 250 OVERRIDE (IP): Mod: JZ | Performed by: INTERNAL MEDICINE

## 2024-01-01 PROCEDURE — 97112 NEUROMUSCULAR REEDUCATION: CPT

## 2024-01-01 PROCEDURE — 99214 OFFICE O/P EST MOD 30 MIN: CPT | Performed by: STUDENT IN AN ORGANIZED HEALTH CARE EDUCATION/TRAINING PROGRAM

## 2024-01-01 PROCEDURE — 83036 HEMOGLOBIN GLYCOSYLATED A1C: CPT

## 2024-01-01 PROCEDURE — 85730 THROMBOPLASTIN TIME PARTIAL: CPT

## 2024-01-01 PROCEDURE — 36415 COLL VENOUS BLD VENIPUNCTURE: CPT

## 2024-01-01 PROCEDURE — 3078F DIAST BP <80 MM HG: CPT | Performed by: STUDENT IN AN ORGANIZED HEALTH CARE EDUCATION/TRAINING PROGRAM

## 2024-01-01 PROCEDURE — 80048 BASIC METABOLIC PNL TOTAL CA: CPT

## 2024-01-01 PROCEDURE — 700111 HCHG RX REV CODE 636 W/ 250 OVERRIDE (IP): Mod: JZ | Performed by: EMERGENCY MEDICINE

## 2024-01-01 PROCEDURE — 94760 N-INVAS EAR/PLS OXIMETRY 1: CPT

## 2024-01-01 PROCEDURE — 97129 THER IVNTJ 1ST 15 MIN: CPT

## 2024-01-01 PROCEDURE — 96375 TX/PRO/DX INJ NEW DRUG ADDON: CPT

## 2024-01-01 PROCEDURE — 84484 ASSAY OF TROPONIN QUANT: CPT

## 2024-01-01 PROCEDURE — 80053 COMPREHEN METABOLIC PANEL: CPT

## 2024-01-01 PROCEDURE — 82962 GLUCOSE BLOOD TEST: CPT | Mod: 91

## 2024-01-01 PROCEDURE — 665036 HSPC NOTICE OF ELECTION NOE

## 2024-01-01 PROCEDURE — 700102 HCHG RX REV CODE 250 W/ 637 OVERRIDE(OP): Performed by: PHYSICAL MEDICINE & REHABILITATION

## 2024-01-01 PROCEDURE — 86901 BLOOD TYPING SEROLOGIC RH(D): CPT

## 2024-01-01 PROCEDURE — 93282 PRGRMG EVAL IMPLANTABLE DFB: CPT | Mod: 26 | Performed by: INTERNAL MEDICINE

## 2024-01-01 PROCEDURE — 87086 URINE CULTURE/COLONY COUNT: CPT

## 2024-01-01 PROCEDURE — 82306 VITAMIN D 25 HYDROXY: CPT

## 2024-01-01 PROCEDURE — 700117 HCHG RX CONTRAST REV CODE 255: Performed by: EMERGENCY MEDICINE

## 2024-01-01 PROCEDURE — 99222 1ST HOSP IP/OBS MODERATE 55: CPT | Performed by: HOSPITALIST

## 2024-01-01 PROCEDURE — 97130 THER IVNTJ EA ADDL 15 MIN: CPT

## 2024-01-01 PROCEDURE — 97535 SELF CARE MNGMENT TRAINING: CPT

## 2024-01-01 PROCEDURE — A9270 NON-COVERED ITEM OR SERVICE: HCPCS | Performed by: INTERNAL MEDICINE

## 2024-01-01 PROCEDURE — 85610 PROTHROMBIN TIME: CPT

## 2024-01-01 PROCEDURE — 99232 SBSQ HOSP IP/OBS MODERATE 35: CPT | Performed by: HOSPITALIST

## 2024-01-01 PROCEDURE — 71250 CT THORAX DX C-: CPT

## 2024-01-01 PROCEDURE — 0042T CT-CEREBRAL PERFUSION ANALYSIS: CPT

## 2024-01-01 PROCEDURE — G0155 HHCP-SVS OF CSW,EA 15 MIN: HCPCS

## 2024-01-01 PROCEDURE — 700117 HCHG RX CONTRAST REV CODE 255: Performed by: INTERNAL MEDICINE

## 2024-01-01 PROCEDURE — 92523 SPEECH SOUND LANG COMPREHEN: CPT

## 2024-01-01 PROCEDURE — 97165 OT EVAL LOW COMPLEX 30 MIN: CPT

## 2024-01-01 PROCEDURE — 3078F DIAST BP <80 MM HG: CPT | Performed by: NURSE PRACTITIONER

## 2024-01-01 PROCEDURE — 99231 SBSQ HOSP IP/OBS SF/LOW 25: CPT | Performed by: HOSPITALIST

## 2024-01-01 PROCEDURE — 97116 GAIT TRAINING THERAPY: CPT

## 2024-01-01 PROCEDURE — 770020 HCHG ROOM/CARE - TELE (206)

## 2024-01-01 PROCEDURE — 97602 WOUND(S) CARE NON-SELECTIVE: CPT

## 2024-01-01 PROCEDURE — 99233 SBSQ HOSP IP/OBS HIGH 50: CPT | Performed by: PHYSICAL MEDICINE & REHABILITATION

## 2024-01-01 PROCEDURE — 99239 HOSP IP/OBS DSCHRG MGMT >30: CPT | Performed by: INTERNAL MEDICINE

## 2024-01-01 PROCEDURE — 99451 NTRPROF PH1/NTRNET/EHR 5/>: CPT | Performed by: INTERNAL MEDICINE

## 2024-01-01 PROCEDURE — 99223 1ST HOSP IP/OBS HIGH 75: CPT | Mod: 25,AI | Performed by: HOSPITALIST

## 2024-01-01 PROCEDURE — 99232 SBSQ HOSP IP/OBS MODERATE 35: CPT | Performed by: PHYSICAL MEDICINE & REHABILITATION

## 2024-01-01 PROCEDURE — 93005 ELECTROCARDIOGRAM TRACING: CPT | Performed by: HOSPITALIST

## 2024-01-01 PROCEDURE — 97530 THERAPEUTIC ACTIVITIES: CPT | Mod: CQ

## 2024-01-01 PROCEDURE — 97161 PT EVAL LOW COMPLEX 20 MIN: CPT

## 2024-01-01 PROCEDURE — 85027 COMPLETE CBC AUTOMATED: CPT

## 2024-01-01 PROCEDURE — 84100 ASSAY OF PHOSPHORUS: CPT

## 2024-01-01 PROCEDURE — 99233 SBSQ HOSP IP/OBS HIGH 50: CPT | Performed by: INTERNAL MEDICINE

## 2024-01-01 PROCEDURE — RXMED WILLOW AMBULATORY MEDICATION CHARGE: Performed by: STUDENT IN AN ORGANIZED HEALTH CARE EDUCATION/TRAINING PROGRAM

## 2024-01-01 PROCEDURE — 3074F SYST BP LT 130 MM HG: CPT | Performed by: INTERNAL MEDICINE

## 2024-01-01 PROCEDURE — 70498 CT ANGIOGRAPHY NECK: CPT

## 2024-01-01 PROCEDURE — 71045 X-RAY EXAM CHEST 1 VIEW: CPT

## 2024-01-01 PROCEDURE — G0156 HHCP-SVS OF AIDE,EA 15 MIN: HCPCS

## 2024-01-01 PROCEDURE — 70551 MRI BRAIN STEM W/O DYE: CPT

## 2024-01-01 PROCEDURE — 83880 ASSAY OF NATRIURETIC PEPTIDE: CPT

## 2024-01-01 PROCEDURE — 97162 PT EVAL MOD COMPLEX 30 MIN: CPT

## 2024-01-01 PROCEDURE — 83735 ASSAY OF MAGNESIUM: CPT

## 2024-01-01 PROCEDURE — 99223 1ST HOSP IP/OBS HIGH 75: CPT | Performed by: PHYSICAL MEDICINE & REHABILITATION

## 2024-01-01 PROCEDURE — 3074F SYST BP LT 130 MM HG: CPT | Performed by: STUDENT IN AN ORGANIZED HEALTH CARE EDUCATION/TRAINING PROGRAM

## 2024-01-01 PROCEDURE — 99214 OFFICE O/P EST MOD 30 MIN: CPT | Performed by: NURSE PRACTITIONER

## 2024-01-01 PROCEDURE — 96374 THER/PROPH/DIAG INJ IV PUSH: CPT

## 2024-01-01 PROCEDURE — 99497 ADVNCD CARE PLAN 30 MIN: CPT | Performed by: HOSPITALIST

## 2024-01-01 PROCEDURE — 73610 X-RAY EXAM OF ANKLE: CPT | Mod: LT

## 2024-01-01 PROCEDURE — 3079F DIAST BP 80-89 MM HG: CPT | Performed by: INTERNAL MEDICINE

## 2024-01-01 PROCEDURE — 93005 ELECTROCARDIOGRAM TRACING: CPT | Performed by: EMERGENCY MEDICINE

## 2024-01-01 PROCEDURE — 700111 HCHG RX REV CODE 636 W/ 250 OVERRIDE (IP): Performed by: HOSPITALIST

## 2024-01-01 PROCEDURE — 93282 PRGRMG EVAL IMPLANTABLE DFB: CPT | Performed by: INTERNAL MEDICINE

## 2024-01-01 PROCEDURE — 99239 HOSP IP/OBS DSCHRG MGMT >30: CPT | Performed by: PHYSICAL MEDICINE & REHABILITATION

## 2024-01-01 PROCEDURE — RXMED WILLOW AMBULATORY MEDICATION CHARGE: Performed by: REHABILITATION PRACTITIONER

## 2024-01-01 PROCEDURE — 93010 ELECTROCARDIOGRAM REPORT: CPT | Performed by: INTERNAL MEDICINE

## 2024-01-01 PROCEDURE — 93306 TTE W/DOPPLER COMPLETE: CPT | Mod: 26 | Performed by: INTERNAL MEDICINE

## 2024-01-01 PROCEDURE — 86900 BLOOD TYPING SEROLOGIC ABO: CPT

## 2024-01-01 PROCEDURE — 86850 RBC ANTIBODY SCREEN: CPT

## 2024-01-01 PROCEDURE — 99212 OFFICE O/P EST SF 10 MIN: CPT | Performed by: INTERNAL MEDICINE

## 2024-01-01 PROCEDURE — 1170F FXNL STATUS ASSESSED: CPT | Performed by: STUDENT IN AN ORGANIZED HEALTH CARE EDUCATION/TRAINING PROGRAM

## 2024-01-01 PROCEDURE — 81001 URINALYSIS AUTO W/SCOPE: CPT

## 2024-01-01 PROCEDURE — 700102 HCHG RX REV CODE 250 W/ 637 OVERRIDE(OP): Performed by: INTERNAL MEDICINE

## 2024-01-01 PROCEDURE — 97166 OT EVAL MOD COMPLEX 45 MIN: CPT

## 2024-01-01 PROCEDURE — 80069 RENAL FUNCTION PANEL: CPT

## 2024-01-01 PROCEDURE — 99213 OFFICE O/P EST LOW 20 MIN: CPT | Performed by: STUDENT IN AN ORGANIZED HEALTH CARE EDUCATION/TRAINING PROGRAM

## 2024-01-01 PROCEDURE — 99215 OFFICE O/P EST HI 40 MIN: CPT | Performed by: STUDENT IN AN ORGANIZED HEALTH CARE EDUCATION/TRAINING PROGRAM

## 2024-01-01 PROCEDURE — 1170F FXNL STATUS ASSESSED: CPT | Performed by: NURSE PRACTITIONER

## 2024-01-01 PROCEDURE — 70450 CT HEAD/BRAIN W/O DYE: CPT

## 2024-01-01 PROCEDURE — 97116 GAIT TRAINING THERAPY: CPT | Mod: CQ

## 2024-01-01 PROCEDURE — 3074F SYST BP LT 130 MM HG: CPT | Performed by: NURSE PRACTITIONER

## 2024-01-01 PROCEDURE — 93925 LOWER EXTREMITY STUDY: CPT

## 2024-01-01 PROCEDURE — 70496 CT ANGIOGRAPHY HEAD: CPT

## 2024-01-01 PROCEDURE — 99285 EMERGENCY DEPT VISIT HI MDM: CPT

## 2024-01-01 PROCEDURE — 93306 TTE W/DOPPLER COMPLETE: CPT

## 2024-01-01 PROCEDURE — 99212 OFFICE O/P EST SF 10 MIN: CPT | Performed by: NURSE PRACTITIONER

## 2024-01-01 PROCEDURE — 93005 ELECTROCARDIOGRAM TRACING: CPT

## 2024-01-01 RX ORDER — ACETAMINOPHEN 650 MG/1
650 SUPPOSITORY RECTAL EVERY 6 HOURS PRN
Qty: 5 SUPPOSITORY | Refills: 10 | Status: SHIPPED | OUTPATIENT
Start: 2024-01-01

## 2024-01-01 RX ORDER — AMLODIPINE BESYLATE 5 MG/1
5 TABLET ORAL
Status: DISCONTINUED | OUTPATIENT
Start: 2024-01-01 | End: 2024-01-01

## 2024-01-01 RX ORDER — MORPHINE SULFATE 4 MG/ML
4 INJECTION INTRAVENOUS ONCE
Status: COMPLETED | OUTPATIENT
Start: 2024-01-01 | End: 2024-01-01

## 2024-01-01 RX ORDER — ALUMINA, MAGNESIA, AND SIMETHICONE 2400; 2400; 240 MG/30ML; MG/30ML; MG/30ML
20 SUSPENSION ORAL
Status: DISCONTINUED | OUTPATIENT
Start: 2024-01-01 | End: 2024-01-01 | Stop reason: HOSPADM

## 2024-01-01 RX ORDER — OMEPRAZOLE 20 MG/1
20 CAPSULE, DELAYED RELEASE ORAL DAILY
Status: DISCONTINUED | OUTPATIENT
Start: 2024-01-01 | End: 2024-01-01 | Stop reason: HOSPADM

## 2024-01-01 RX ORDER — AMLODIPINE BESYLATE 5 MG/1
5 TABLET ORAL
Status: DISCONTINUED | OUTPATIENT
Start: 2024-01-01 | End: 2024-01-01 | Stop reason: HOSPADM

## 2024-01-01 RX ORDER — NITROGLYCERIN 0.4 MG/1
0.4 TABLET SUBLINGUAL
Status: DISCONTINUED | OUTPATIENT
Start: 2024-01-01 | End: 2024-01-01 | Stop reason: HOSPADM

## 2024-01-01 RX ORDER — INSULIN LISPRO 100 [IU]/ML
2-12 INJECTION, SOLUTION INTRAVENOUS; SUBCUTANEOUS
Status: ACTIVE | DISCHARGE
Start: 2024-01-01 | End: 2024-01-01

## 2024-01-01 RX ORDER — ASPIRIN 81 MG/1
81 TABLET ORAL EVERY EVENING
Status: DISCONTINUED | OUTPATIENT
Start: 2024-01-01 | End: 2024-01-01 | Stop reason: HOSPADM

## 2024-01-01 RX ORDER — AMLODIPINE BESYLATE 5 MG/1
5 TABLET ORAL
Status: CANCELLED | OUTPATIENT
Start: 2024-01-01

## 2024-01-01 RX ORDER — OXYCODONE HYDROCHLORIDE 10 MG/1
10 TABLET ORAL
Status: DISCONTINUED | OUTPATIENT
Start: 2024-01-01 | End: 2024-01-01 | Stop reason: HOSPADM

## 2024-01-01 RX ORDER — DAPAGLIFLOZIN 5 MG/1
5 TABLET, FILM COATED ORAL DAILY
Status: ON HOLD | COMMUNITY
End: 2024-01-01

## 2024-01-01 RX ORDER — ASPIRIN 81 MG/1
162 TABLET ORAL ONCE
Status: COMPLETED | OUTPATIENT
Start: 2024-01-01 | End: 2024-01-01

## 2024-01-01 RX ORDER — OXYCODONE HYDROCHLORIDE 5 MG/1
2.5 TABLET ORAL
Status: DISCONTINUED | OUTPATIENT
Start: 2024-01-01 | End: 2024-01-01 | Stop reason: HOSPADM

## 2024-01-01 RX ORDER — ACETAMINOPHEN 325 MG/1
650 TABLET ORAL EVERY 4 HOURS PRN
COMMUNITY
End: 2024-01-01

## 2024-01-01 RX ORDER — FINASTERIDE 5 MG/1
5 TABLET, FILM COATED ORAL
Status: CANCELLED | OUTPATIENT
Start: 2024-01-01

## 2024-01-01 RX ORDER — DEXTROSE MONOHYDRATE 25 G/50ML
25 INJECTION, SOLUTION INTRAVENOUS
Status: DISCONTINUED | OUTPATIENT
Start: 2024-01-01 | End: 2024-01-01 | Stop reason: HOSPADM

## 2024-01-01 RX ORDER — ONDANSETRON 4 MG/1
4 TABLET, FILM COATED ORAL EVERY 4 HOURS PRN
Qty: 30 TABLET | Refills: 10 | OUTPATIENT
Start: 2024-01-01

## 2024-01-01 RX ORDER — TAMSULOSIN HYDROCHLORIDE 0.4 MG/1
0.4 CAPSULE ORAL DAILY
Status: DISCONTINUED | OUTPATIENT
Start: 2024-01-01 | End: 2024-01-01 | Stop reason: HOSPADM

## 2024-01-01 RX ORDER — OXYCODONE HYDROCHLORIDE 10 MG/1
10 TABLET ORAL
Status: DISCONTINUED | OUTPATIENT
Start: 2024-01-01 | End: 2024-01-01

## 2024-01-01 RX ORDER — FINASTERIDE 5 MG/1
5 TABLET, FILM COATED ORAL
Status: DISCONTINUED | OUTPATIENT
Start: 2024-01-01 | End: 2024-01-01 | Stop reason: HOSPADM

## 2024-01-01 RX ORDER — DICYCLOMINE HCL 20 MG
10 TABLET ORAL EVERY 6 HOURS PRN
Status: DISCONTINUED | OUTPATIENT
Start: 2024-01-01 | End: 2024-01-01 | Stop reason: HOSPADM

## 2024-01-01 RX ORDER — ECHINACEA PURPUREA EXTRACT 125 MG
2 TABLET ORAL PRN
Status: DISCONTINUED | OUTPATIENT
Start: 2024-01-01 | End: 2024-01-01 | Stop reason: HOSPADM

## 2024-01-01 RX ORDER — OXYCODONE HYDROCHLORIDE 10 MG/1
10 TABLET ORAL EVERY 4 HOURS PRN
Qty: 240 TABLET | Refills: 0 | Status: SHIPPED | OUTPATIENT
Start: 2024-01-01 | End: 2024-01-01 | Stop reason: SDUPTHER

## 2024-01-01 RX ORDER — HYDRALAZINE HYDROCHLORIDE 25 MG/1
25 TABLET, FILM COATED ORAL EVERY 8 HOURS PRN
Status: DISCONTINUED | OUTPATIENT
Start: 2024-01-01 | End: 2024-01-01 | Stop reason: HOSPADM

## 2024-01-01 RX ORDER — INSULIN DEGLUDEC 100 U/ML
10 INJECTION, SOLUTION SUBCUTANEOUS EVERY MORNING
Status: ON HOLD | COMMUNITY
End: 2024-01-01

## 2024-01-01 RX ORDER — ONDANSETRON 4 MG/1
4 TABLET, ORALLY DISINTEGRATING ORAL 4 TIMES DAILY PRN
Status: DISCONTINUED | OUTPATIENT
Start: 2024-01-01 | End: 2024-01-01 | Stop reason: HOSPADM

## 2024-01-01 RX ORDER — NICOTINE POLACRILEX 4 MG/1
GUM, CHEWING ORAL DAILY
COMMUNITY
End: 2024-01-01

## 2024-01-01 RX ORDER — OXYCODONE HYDROCHLORIDE 10 MG/1
10-20 TABLET ORAL
Qty: 360 TABLET | Refills: 0 | Status: SHIPPED | OUTPATIENT
Start: 2024-01-01 | End: 2024-03-17

## 2024-01-01 RX ORDER — MORPHINE SULFATE 100 MG/5ML
10-20 SOLUTION ORAL
Qty: 240 ML | Refills: 0 | Status: SHIPPED | OUTPATIENT
Start: 2024-01-01 | End: 2025-03-14

## 2024-01-01 RX ORDER — AMOXICILLIN 250 MG
2 CAPSULE ORAL 2 TIMES DAILY
Status: DISCONTINUED | OUTPATIENT
Start: 2024-01-01 | End: 2024-01-01 | Stop reason: HOSPADM

## 2024-01-01 RX ORDER — TRAZODONE HYDROCHLORIDE 50 MG/1
50 TABLET ORAL
Status: DISCONTINUED | OUTPATIENT
Start: 2024-01-01 | End: 2024-01-01 | Stop reason: HOSPADM

## 2024-01-01 RX ORDER — ASPIRIN 81 MG/1
81 TABLET ORAL EVERY EVENING
Status: SHIPPED
Start: 2024-01-01 | End: 2024-01-01

## 2024-01-01 RX ORDER — DICYCLOMINE HYDROCHLORIDE 10 MG/1
10 CAPSULE ORAL EVERY 6 HOURS PRN
Qty: 30 CAPSULE | Refills: 23 | Status: SHIPPED | OUTPATIENT
Start: 2024-01-01 | End: 2024-01-01

## 2024-01-01 RX ORDER — TAMSULOSIN HYDROCHLORIDE 0.4 MG/1
0.4 CAPSULE ORAL DAILY
Status: CANCELLED | OUTPATIENT
Start: 2024-01-01

## 2024-01-01 RX ORDER — OXYCODONE HYDROCHLORIDE 10 MG/1
10-15 TABLET ORAL
Qty: 28 TABLET | Refills: 0 | Status: SHIPPED | OUTPATIENT
Start: 2024-01-01 | End: 2024-01-01

## 2024-01-01 RX ORDER — AMLODIPINE BESYLATE 5 MG/1
2.5 TABLET ORAL
Status: DISCONTINUED | OUTPATIENT
Start: 2024-01-01 | End: 2024-01-01

## 2024-01-01 RX ORDER — TAMSULOSIN HYDROCHLORIDE 0.4 MG/1
0.4 CAPSULE ORAL
Qty: 90 CAPSULE | Refills: 1 | Status: SHIPPED | OUTPATIENT
Start: 2024-01-01

## 2024-01-01 RX ORDER — INSULIN LISPRO 100 [IU]/ML
2-12 INJECTION, SOLUTION INTRAVENOUS; SUBCUTANEOUS
Status: DISCONTINUED | OUTPATIENT
Start: 2024-01-01 | End: 2024-01-01 | Stop reason: HOSPADM

## 2024-01-01 RX ORDER — ACETAMINOPHEN 325 MG/1
650 TABLET ORAL EVERY 4 HOURS PRN
Status: DISCONTINUED | OUTPATIENT
Start: 2024-01-01 | End: 2024-01-01 | Stop reason: HOSPADM

## 2024-01-01 RX ORDER — AMOXICILLIN 250 MG
1 CAPSULE ORAL DAILY
COMMUNITY
End: 2024-01-01

## 2024-01-01 RX ORDER — LORAZEPAM 2 MG/ML
1 CONCENTRATE ORAL
Qty: 360 ML | Refills: 0 | Status: SHIPPED | OUTPATIENT
Start: 2024-01-01 | End: 2024-01-01

## 2024-01-01 RX ORDER — SENNA AND DOCUSATE SODIUM 50; 8.6 MG/1; MG/1
2 TABLET, FILM COATED ORAL 2 TIMES DAILY PRN
Qty: 28 TABLET | Refills: 10 | Status: SHIPPED | OUTPATIENT
Start: 2024-01-01 | End: 2025-03-11

## 2024-01-01 RX ORDER — CARVEDILOL 3.12 MG/1
3.12 TABLET ORAL 2 TIMES DAILY WITH MEALS
Qty: 30 TABLET | Refills: 10 | Status: SHIPPED | OUTPATIENT
Start: 2024-01-01

## 2024-01-01 RX ORDER — HYDROMORPHONE HYDROCHLORIDE 1 MG/ML
0.25 INJECTION, SOLUTION INTRAMUSCULAR; INTRAVENOUS; SUBCUTANEOUS
Status: DISCONTINUED | OUTPATIENT
Start: 2024-01-01 | End: 2024-01-01 | Stop reason: HOSPADM

## 2024-01-01 RX ORDER — HEPARIN SODIUM 5000 [USP'U]/ML
5000 INJECTION, SOLUTION INTRAVENOUS; SUBCUTANEOUS EVERY 8 HOURS
Status: DISCONTINUED | OUTPATIENT
Start: 2024-01-01 | End: 2024-01-01

## 2024-01-01 RX ORDER — AMLODIPINE BESYLATE 5 MG/1
7.5 TABLET ORAL DAILY
Status: SHIPPED
Start: 2024-01-01 | End: 2024-01-01

## 2024-01-01 RX ORDER — AMLODIPINE BESYLATE 5 MG/1
5 TABLET ORAL DAILY
Status: ON HOLD
Start: 2024-01-01 | End: 2024-01-01

## 2024-01-01 RX ORDER — ASPIRIN 81 MG/1
81 TABLET ORAL EVERY EVENING
Status: CANCELLED | OUTPATIENT
Start: 2024-01-01

## 2024-01-01 RX ORDER — POLYETHYLENE GLYCOL 3350 17 G/17G
1 POWDER, FOR SOLUTION ORAL
Status: DISCONTINUED | OUTPATIENT
Start: 2024-01-01 | End: 2024-01-01 | Stop reason: HOSPADM

## 2024-01-01 RX ORDER — ONDANSETRON 4 MG/1
4 TABLET, ORALLY DISINTEGRATING ORAL EVERY 4 HOURS PRN
Status: DISCONTINUED | OUTPATIENT
Start: 2024-01-01 | End: 2024-01-01 | Stop reason: HOSPADM

## 2024-01-01 RX ORDER — DICYCLOMINE HCL 20 MG
10 TABLET ORAL EVERY 6 HOURS PRN
Status: CANCELLED | OUTPATIENT
Start: 2024-01-01

## 2024-01-01 RX ORDER — OXYCODONE HYDROCHLORIDE 5 MG/1
5 TABLET ORAL
Status: DISCONTINUED | OUTPATIENT
Start: 2024-01-01 | End: 2024-01-01

## 2024-01-01 RX ORDER — VITAMIN B COMPLEX
1000 TABLET ORAL DAILY
Status: DISCONTINUED | OUTPATIENT
Start: 2024-01-01 | End: 2024-01-01 | Stop reason: HOSPADM

## 2024-01-01 RX ORDER — ONDANSETRON 2 MG/ML
4 INJECTION INTRAMUSCULAR; INTRAVENOUS EVERY 4 HOURS PRN
Status: DISCONTINUED | OUTPATIENT
Start: 2024-01-01 | End: 2024-01-01 | Stop reason: HOSPADM

## 2024-01-01 RX ORDER — BISACODYL 10 MG
10 SUPPOSITORY, RECTAL RECTAL PRN
Qty: 5 SUPPOSITORY | Refills: 10 | Status: SHIPPED | OUTPATIENT
Start: 2024-01-01 | End: 2025-03-11

## 2024-01-01 RX ORDER — ONDANSETRON 4 MG/1
4 TABLET, ORALLY DISINTEGRATING ORAL EVERY 6 HOURS PRN
Qty: 15 TABLET | Refills: 10 | Status: SHIPPED | OUTPATIENT
Start: 2024-01-01 | End: 2024-01-01

## 2024-01-01 RX ORDER — OXYCODONE HYDROCHLORIDE 5 MG/1
5 TABLET ORAL
Status: DISCONTINUED | OUTPATIENT
Start: 2024-01-01 | End: 2024-01-01 | Stop reason: HOSPADM

## 2024-01-01 RX ORDER — AMLODIPINE BESYLATE 5 MG/1
7.5 TABLET ORAL
Status: DISCONTINUED | OUTPATIENT
Start: 2024-01-01 | End: 2024-01-01 | Stop reason: HOSPADM

## 2024-01-01 RX ORDER — DEXTROSE MONOHYDRATE 25 G/50ML
25 INJECTION, SOLUTION INTRAVENOUS
Status: CANCELLED | OUTPATIENT
Start: 2024-01-01

## 2024-01-01 RX ORDER — MORPHINE SULFATE 100 MG/5ML
10 SOLUTION ORAL
Qty: 240 ML | Refills: 0 | Status: SHIPPED | OUTPATIENT
Start: 2024-01-01 | End: 2024-01-01 | Stop reason: SDUPTHER

## 2024-01-01 RX ORDER — FAMOTIDINE 20 MG/1
20 TABLET, FILM COATED ORAL ONCE
Status: COMPLETED | OUTPATIENT
Start: 2024-01-01 | End: 2024-01-01

## 2024-01-01 RX ORDER — ONDANSETRON 2 MG/ML
4 INJECTION INTRAMUSCULAR; INTRAVENOUS 4 TIMES DAILY PRN
Status: DISCONTINUED | OUTPATIENT
Start: 2024-01-01 | End: 2024-01-01 | Stop reason: HOSPADM

## 2024-01-01 RX ORDER — INSULIN GLARGINE 100 [IU]/ML
12 INJECTION, SOLUTION SUBCUTANEOUS DAILY
Status: ACTIVE | DISCHARGE
Start: 2024-01-01 | End: 2024-01-01

## 2024-01-01 RX ORDER — OXYCODONE HYDROCHLORIDE 10 MG/1
10 TABLET ORAL EVERY 4 HOURS PRN
COMMUNITY
End: 2024-01-01

## 2024-01-01 RX ORDER — ONDANSETRON 2 MG/ML
4 INJECTION INTRAMUSCULAR; INTRAVENOUS ONCE
Status: COMPLETED | OUTPATIENT
Start: 2024-01-01 | End: 2024-01-01

## 2024-01-01 RX ORDER — LORAZEPAM 2 MG/ML
1-2 CONCENTRATE ORAL
Qty: 360 ML | Refills: 0 | Status: SHIPPED | OUTPATIENT
Start: 2024-01-01 | End: 2024-03-17

## 2024-01-01 RX ORDER — ACETAMINOPHEN 500 MG
1000 TABLET ORAL 2 TIMES DAILY
Qty: 60 TABLET | Refills: 11 | Status: SHIPPED | OUTPATIENT
Start: 2024-01-01 | End: 2025-03-13

## 2024-01-01 RX ORDER — ACETAMINOPHEN 325 MG/1
650 TABLET ORAL EVERY 6 HOURS PRN
Status: DISCONTINUED | OUTPATIENT
Start: 2024-01-01 | End: 2024-01-01 | Stop reason: HOSPADM

## 2024-01-01 RX ORDER — DIAZEPAM 5 MG/ML
5 INJECTION, SOLUTION INTRAMUSCULAR; INTRAVENOUS ONCE
Status: DISCONTINUED | OUTPATIENT
Start: 2024-01-01 | End: 2024-01-01

## 2024-01-01 RX ORDER — ACETAMINOPHEN 500 MG
1000 TABLET ORAL EVERY 6 HOURS PRN
Qty: 30 TABLET | Refills: 10 | Status: SHIPPED | OUTPATIENT
Start: 2024-01-01 | End: 2024-01-01 | Stop reason: DRUGHIGH

## 2024-01-01 RX ORDER — FINASTERIDE 5 MG/1
5 TABLET, FILM COATED ORAL
Qty: 90 TABLET | Refills: 1 | Status: SHIPPED | OUTPATIENT
Start: 2024-01-01

## 2024-01-01 RX ADMIN — TRAZODONE HYDROCHLORIDE 50 MG: 50 TABLET ORAL at 01:00

## 2024-01-01 RX ADMIN — OXYCODONE HYDROCHLORIDE 10 MG: 10 TABLET ORAL at 00:17

## 2024-01-01 RX ADMIN — OMEPRAZOLE 20 MG: 20 CAPSULE, DELAYED RELEASE ORAL at 08:51

## 2024-01-01 RX ADMIN — OXYCODONE HYDROCHLORIDE 10 MG: 10 TABLET ORAL at 08:35

## 2024-01-01 RX ADMIN — DOCUSATE SODIUM 50MG AND SENNOSIDES 8.6MG 2 TABLET: 8.6; 5 TABLET, FILM COATED ORAL at 08:04

## 2024-01-01 RX ADMIN — APIXABAN 5 MG: 5 TABLET, FILM COATED ORAL at 20:54

## 2024-01-01 RX ADMIN — OMEPRAZOLE 20 MG: 20 CAPSULE, DELAYED RELEASE ORAL at 05:30

## 2024-01-01 RX ADMIN — FINASTERIDE 5 MG: 5 TABLET, FILM COATED ORAL at 06:01

## 2024-01-01 RX ADMIN — MICONAZOLE NITRATE: 20 CREAM TOPICAL at 09:29

## 2024-01-01 RX ADMIN — AMLODIPINE BESYLATE 7.5 MG: 5 TABLET ORAL at 05:37

## 2024-01-01 RX ADMIN — OXYCODONE HYDROCHLORIDE 10 MG: 10 TABLET ORAL at 05:27

## 2024-01-01 RX ADMIN — Medication 1000 UNITS: at 09:21

## 2024-01-01 RX ADMIN — DOCUSATE SODIUM 50MG AND SENNOSIDES 8.6MG 2 TABLET: 8.6; 5 TABLET, FILM COATED ORAL at 21:45

## 2024-01-01 RX ADMIN — APIXABAN 5 MG: 5 TABLET, FILM COATED ORAL at 08:12

## 2024-01-01 RX ADMIN — APIXABAN 5 MG: 5 TABLET, FILM COATED ORAL at 17:07

## 2024-01-01 RX ADMIN — INSULIN HUMAN 1 UNITS: 100 INJECTION, SOLUTION PARENTERAL at 11:10

## 2024-01-01 RX ADMIN — OXYCODONE 5 MG: 5 TABLET ORAL at 12:57

## 2024-01-01 RX ADMIN — TRAZODONE HYDROCHLORIDE 50 MG: 50 TABLET ORAL at 21:01

## 2024-01-01 RX ADMIN — OXYCODONE HYDROCHLORIDE 10 MG: 10 TABLET ORAL at 14:00

## 2024-01-01 RX ADMIN — DOCUSATE SODIUM 50MG AND SENNOSIDES 8.6MG 2 TABLET: 8.6; 5 TABLET, FILM COATED ORAL at 20:23

## 2024-01-01 RX ADMIN — OMEPRAZOLE 20 MG: 20 CAPSULE, DELAYED RELEASE ORAL at 09:21

## 2024-01-01 RX ADMIN — APIXABAN 5 MG: 5 TABLET, FILM COATED ORAL at 20:40

## 2024-01-01 RX ADMIN — FINASTERIDE 5 MG: 5 TABLET, FILM COATED ORAL at 05:55

## 2024-01-01 RX ADMIN — OMEPRAZOLE 20 MG: 20 CAPSULE, DELAYED RELEASE ORAL at 08:12

## 2024-01-01 RX ADMIN — INSULIN LISPRO 2 UNITS: 100 INJECTION, SOLUTION INTRAVENOUS; SUBCUTANEOUS at 21:48

## 2024-01-01 RX ADMIN — ASPIRIN 81 MG: 81 TABLET, COATED ORAL at 17:22

## 2024-01-01 RX ADMIN — OXYCODONE 5 MG: 5 TABLET ORAL at 20:55

## 2024-01-01 RX ADMIN — ASPIRIN 81 MG: 81 TABLET, COATED ORAL at 17:07

## 2024-01-01 RX ADMIN — APIXABAN 5 MG: 5 TABLET, FILM COATED ORAL at 22:51

## 2024-01-01 RX ADMIN — FINASTERIDE 5 MG: 5 TABLET, FILM COATED ORAL at 05:39

## 2024-01-01 RX ADMIN — FINASTERIDE 5 MG: 5 TABLET, FILM COATED ORAL at 05:07

## 2024-01-01 RX ADMIN — APIXABAN 5 MG: 5 TABLET, FILM COATED ORAL at 08:52

## 2024-01-01 RX ADMIN — OXYCODONE 5 MG: 5 TABLET ORAL at 07:30

## 2024-01-01 RX ADMIN — OMEPRAZOLE 20 MG: 20 CAPSULE, DELAYED RELEASE ORAL at 08:18

## 2024-01-01 RX ADMIN — AMLODIPINE BESYLATE 7.5 MG: 5 TABLET ORAL at 06:09

## 2024-01-01 RX ADMIN — OXYCODONE 5 MG: 5 TABLET ORAL at 05:05

## 2024-01-01 RX ADMIN — Medication 1000 UNITS: at 08:52

## 2024-01-01 RX ADMIN — INSULIN LISPRO 2 UNITS: 100 INJECTION, SOLUTION INTRAVENOUS; SUBCUTANEOUS at 21:01

## 2024-01-01 RX ADMIN — INSULIN GLARGINE-YFGN 10 UNITS: 100 INJECTION, SOLUTION SUBCUTANEOUS at 07:25

## 2024-01-01 RX ADMIN — OXYCODONE HYDROCHLORIDE 10 MG: 10 TABLET ORAL at 08:23

## 2024-01-01 RX ADMIN — OXYCODONE HYDROCHLORIDE 5 MG: 5 TABLET ORAL at 11:37

## 2024-01-01 RX ADMIN — Medication 1000 UNITS: at 08:12

## 2024-01-01 RX ADMIN — APIXABAN 5 MG: 5 TABLET, FILM COATED ORAL at 08:26

## 2024-01-01 RX ADMIN — INSULIN LISPRO 4 UNITS: 100 INJECTION, SOLUTION INTRAVENOUS; SUBCUTANEOUS at 11:29

## 2024-01-01 RX ADMIN — APIXABAN 5 MG: 5 TABLET, FILM COATED ORAL at 21:01

## 2024-01-01 RX ADMIN — IOHEXOL 80 ML: 350 INJECTION, SOLUTION INTRAVENOUS at 16:35

## 2024-01-01 RX ADMIN — AMLODIPINE BESYLATE 2.5 MG: 5 TABLET ORAL at 05:30

## 2024-01-01 RX ADMIN — AMLODIPINE BESYLATE 5 MG: 5 TABLET ORAL at 05:46

## 2024-01-01 RX ADMIN — FINASTERIDE 5 MG: 5 TABLET, FILM COATED ORAL at 05:45

## 2024-01-01 RX ADMIN — OXYCODONE HYDROCHLORIDE 10 MG: 10 TABLET ORAL at 06:01

## 2024-01-01 RX ADMIN — APIXABAN 5 MG: 5 TABLET, FILM COATED ORAL at 21:45

## 2024-01-01 RX ADMIN — OXYCODONE HYDROCHLORIDE 10 MG: 10 TABLET ORAL at 14:05

## 2024-01-01 RX ADMIN — INSULIN HUMAN 1 UNITS: 100 INJECTION, SOLUTION PARENTERAL at 05:51

## 2024-01-01 RX ADMIN — OXYCODONE HYDROCHLORIDE 10 MG: 10 TABLET ORAL at 20:40

## 2024-01-01 RX ADMIN — OMEPRAZOLE 20 MG: 20 CAPSULE, DELAYED RELEASE ORAL at 05:45

## 2024-01-01 RX ADMIN — INSULIN GLARGINE-YFGN 10 UNITS: 100 INJECTION, SOLUTION SUBCUTANEOUS at 09:15

## 2024-01-01 RX ADMIN — TAMSULOSIN HYDROCHLORIDE 0.4 MG: 0.4 CAPSULE ORAL at 08:18

## 2024-01-01 RX ADMIN — FINASTERIDE 5 MG: 5 TABLET, FILM COATED ORAL at 05:43

## 2024-01-01 RX ADMIN — DOCUSATE SODIUM 50MG AND SENNOSIDES 8.6MG 2 TABLET: 8.6; 5 TABLET, FILM COATED ORAL at 22:50

## 2024-01-01 RX ADMIN — OMEPRAZOLE 20 MG: 20 CAPSULE, DELAYED RELEASE ORAL at 08:52

## 2024-01-01 RX ADMIN — MICONAZOLE NITRATE: 20 CREAM TOPICAL at 21:36

## 2024-01-01 RX ADMIN — OMEPRAZOLE 20 MG: 20 CAPSULE, DELAYED RELEASE ORAL at 08:34

## 2024-01-01 RX ADMIN — TRAZODONE HYDROCHLORIDE 50 MG: 50 TABLET ORAL at 22:50

## 2024-01-01 RX ADMIN — APIXABAN 5 MG: 5 TABLET, FILM COATED ORAL at 21:23

## 2024-01-01 RX ADMIN — INSULIN LISPRO 2 UNITS: 100 INJECTION, SOLUTION INTRAVENOUS; SUBCUTANEOUS at 11:13

## 2024-01-01 RX ADMIN — OXYCODONE HYDROCHLORIDE 10 MG: 10 TABLET ORAL at 08:50

## 2024-01-01 RX ADMIN — INSULIN GLARGINE-YFGN 10 UNITS: 100 INJECTION, SOLUTION SUBCUTANEOUS at 08:16

## 2024-01-01 RX ADMIN — OXYCODONE HYDROCHLORIDE 10 MG: 10 TABLET ORAL at 21:53

## 2024-01-01 RX ADMIN — APIXABAN 5 MG: 5 TABLET, FILM COATED ORAL at 05:45

## 2024-01-01 RX ADMIN — INSULIN GLARGINE-YFGN 10 UNITS: 100 INJECTION, SOLUTION SUBCUTANEOUS at 05:35

## 2024-01-01 RX ADMIN — APIXABAN 5 MG: 5 TABLET, FILM COATED ORAL at 20:39

## 2024-01-01 RX ADMIN — DICYCLOMINE HYDROCHLORIDE 10 MG: 20 TABLET ORAL at 02:38

## 2024-01-01 RX ADMIN — OXYCODONE HYDROCHLORIDE 10 MG: 10 TABLET ORAL at 22:50

## 2024-01-01 RX ADMIN — OXYCODONE HYDROCHLORIDE 10 MG: 10 TABLET ORAL at 09:17

## 2024-01-01 RX ADMIN — ASPIRIN 81 MG: 81 TABLET, COATED ORAL at 20:54

## 2024-01-01 RX ADMIN — INSULIN LISPRO 4 UNITS: 100 INJECTION, SOLUTION INTRAVENOUS; SUBCUTANEOUS at 11:59

## 2024-01-01 RX ADMIN — MICONAZOLE NITRATE: 20 CREAM TOPICAL at 09:00

## 2024-01-01 RX ADMIN — OXYCODONE HYDROCHLORIDE 10 MG: 10 TABLET ORAL at 17:44

## 2024-01-01 RX ADMIN — AMLODIPINE BESYLATE 7.5 MG: 5 TABLET ORAL at 05:55

## 2024-01-01 RX ADMIN — INSULIN LISPRO 2 UNITS: 100 INJECTION, SOLUTION INTRAVENOUS; SUBCUTANEOUS at 11:32

## 2024-01-01 RX ADMIN — TAMSULOSIN HYDROCHLORIDE 0.4 MG: 0.4 CAPSULE ORAL at 08:11

## 2024-01-01 RX ADMIN — FINASTERIDE 5 MG: 5 TABLET, FILM COATED ORAL at 06:09

## 2024-01-01 RX ADMIN — TAMSULOSIN HYDROCHLORIDE 0.4 MG: 0.4 CAPSULE ORAL at 08:12

## 2024-01-01 RX ADMIN — APIXABAN 5 MG: 5 TABLET, FILM COATED ORAL at 20:23

## 2024-01-01 RX ADMIN — OXYCODONE 5 MG: 5 TABLET ORAL at 18:32

## 2024-01-01 RX ADMIN — ACETAMINOPHEN 650 MG: 325 TABLET ORAL at 14:39

## 2024-01-01 RX ADMIN — APIXABAN 5 MG: 5 TABLET, FILM COATED ORAL at 08:18

## 2024-01-01 RX ADMIN — OXYCODONE HYDROCHLORIDE 10 MG: 10 TABLET ORAL at 21:26

## 2024-01-01 RX ADMIN — TAMSULOSIN HYDROCHLORIDE 0.4 MG: 0.4 CAPSULE ORAL at 08:26

## 2024-01-01 RX ADMIN — APIXABAN 5 MG: 5 TABLET, FILM COATED ORAL at 20:38

## 2024-01-01 RX ADMIN — INSULIN HUMAN 2 UNITS: 100 INJECTION, SOLUTION PARENTERAL at 17:01

## 2024-01-01 RX ADMIN — OXYCODONE HYDROCHLORIDE 10 MG: 10 TABLET ORAL at 00:56

## 2024-01-01 RX ADMIN — APIXABAN 5 MG: 5 TABLET, FILM COATED ORAL at 09:12

## 2024-01-01 RX ADMIN — FINASTERIDE 5 MG: 5 TABLET, FILM COATED ORAL at 05:24

## 2024-01-01 RX ADMIN — TAMSULOSIN HYDROCHLORIDE 0.4 MG: 0.4 CAPSULE ORAL at 08:15

## 2024-01-01 RX ADMIN — ASPIRIN 81 MG: 81 TABLET, COATED ORAL at 20:23

## 2024-01-01 RX ADMIN — INSULIN GLARGINE-YFGN 10 UNITS: 100 INJECTION, SOLUTION SUBCUTANEOUS at 05:46

## 2024-01-01 RX ADMIN — ONDANSETRON 4 MG: 2 INJECTION INTRAMUSCULAR; INTRAVENOUS at 16:43

## 2024-01-01 RX ADMIN — DOCUSATE SODIUM 50MG AND SENNOSIDES 8.6MG 2 TABLET: 8.6; 5 TABLET, FILM COATED ORAL at 08:15

## 2024-01-01 RX ADMIN — ASPIRIN 81 MG: 81 TABLET, COATED ORAL at 21:02

## 2024-01-01 RX ADMIN — ASPIRIN 81 MG: 81 TABLET, COATED ORAL at 20:53

## 2024-01-01 RX ADMIN — FAMOTIDINE 20 MG: 10 INJECTION, SOLUTION INTRAVENOUS at 08:17

## 2024-01-01 RX ADMIN — DOCUSATE SODIUM 50MG AND SENNOSIDES 8.6MG 2 TABLET: 8.6; 5 TABLET, FILM COATED ORAL at 08:34

## 2024-01-01 RX ADMIN — ACETAMINOPHEN 650 MG: 325 TABLET ORAL at 15:57

## 2024-01-01 RX ADMIN — DOCUSATE SODIUM 50MG AND SENNOSIDES 8.6MG 2 TABLET: 8.6; 5 TABLET, FILM COATED ORAL at 08:18

## 2024-01-01 RX ADMIN — OXYCODONE HYDROCHLORIDE 2.5 MG: 5 TABLET ORAL at 20:14

## 2024-01-01 RX ADMIN — FINASTERIDE 5 MG: 5 TABLET, FILM COATED ORAL at 05:49

## 2024-01-01 RX ADMIN — APIXABAN 5 MG: 5 TABLET, FILM COATED ORAL at 05:30

## 2024-01-01 RX ADMIN — AMLODIPINE BESYLATE 7.5 MG: 5 TABLET ORAL at 05:49

## 2024-01-01 RX ADMIN — Medication 1000 UNITS: at 09:12

## 2024-01-01 RX ADMIN — APIXABAN 5 MG: 5 TABLET, FILM COATED ORAL at 08:15

## 2024-01-01 RX ADMIN — MICONAZOLE NITRATE: 20 CREAM TOPICAL at 08:36

## 2024-01-01 RX ADMIN — OMEPRAZOLE 20 MG: 20 CAPSULE, DELAYED RELEASE ORAL at 09:12

## 2024-01-01 RX ADMIN — OXYCODONE HYDROCHLORIDE 10 MG: 10 TABLET ORAL at 09:14

## 2024-01-01 RX ADMIN — OXYCODONE HYDROCHLORIDE 10 MG: 10 TABLET ORAL at 17:11

## 2024-01-01 RX ADMIN — INSULIN LISPRO 2 UNITS: 100 INJECTION, SOLUTION INTRAVENOUS; SUBCUTANEOUS at 11:06

## 2024-01-01 RX ADMIN — APIXABAN 5 MG: 5 TABLET, FILM COATED ORAL at 08:11

## 2024-01-01 RX ADMIN — AMLODIPINE BESYLATE 7.5 MG: 5 TABLET ORAL at 06:01

## 2024-01-01 RX ADMIN — ACETAMINOPHEN 650 MG: 325 TABLET ORAL at 08:51

## 2024-01-01 RX ADMIN — DOCUSATE SODIUM 50MG AND SENNOSIDES 8.6MG 2 TABLET: 8.6; 5 TABLET, FILM COATED ORAL at 17:06

## 2024-01-01 RX ADMIN — INSULIN LISPRO 2 UNITS: 100 INJECTION, SOLUTION INTRAVENOUS; SUBCUTANEOUS at 11:48

## 2024-01-01 RX ADMIN — APIXABAN 5 MG: 5 TABLET, FILM COATED ORAL at 20:44

## 2024-01-01 RX ADMIN — OXYCODONE 5 MG: 5 TABLET ORAL at 20:53

## 2024-01-01 RX ADMIN — OXYCODONE HYDROCHLORIDE 10 MG: 10 TABLET ORAL at 17:37

## 2024-01-01 RX ADMIN — TAMSULOSIN HYDROCHLORIDE 0.4 MG: 0.4 CAPSULE ORAL at 05:30

## 2024-01-01 RX ADMIN — MICONAZOLE NITRATE: 20 CREAM TOPICAL at 21:00

## 2024-01-01 RX ADMIN — APIXABAN 5 MG: 5 TABLET, FILM COATED ORAL at 05:43

## 2024-01-01 RX ADMIN — OXYCODONE HYDROCHLORIDE 15 MG: 10 TABLET ORAL at 21:33

## 2024-01-01 RX ADMIN — OXYCODONE HYDROCHLORIDE 10 MG: 10 TABLET ORAL at 17:33

## 2024-01-01 RX ADMIN — INSULIN LISPRO 2 UNITS: 100 INJECTION, SOLUTION INTRAVENOUS; SUBCUTANEOUS at 08:06

## 2024-01-01 RX ADMIN — OXYCODONE HYDROCHLORIDE 15 MG: 10 TABLET ORAL at 15:40

## 2024-01-01 RX ADMIN — ACETAMINOPHEN 650 MG: 325 TABLET ORAL at 10:50

## 2024-01-01 RX ADMIN — IOHEXOL 40 ML: 350 INJECTION, SOLUTION INTRAVENOUS at 16:49

## 2024-01-01 RX ADMIN — TAMSULOSIN HYDROCHLORIDE 0.4 MG: 0.4 CAPSULE ORAL at 05:43

## 2024-01-01 RX ADMIN — OXYCODONE HYDROCHLORIDE 10 MG: 10 TABLET ORAL at 12:41

## 2024-01-01 RX ADMIN — Medication 1000 UNITS: at 08:11

## 2024-01-01 RX ADMIN — APIXABAN 5 MG: 5 TABLET, FILM COATED ORAL at 09:21

## 2024-01-01 RX ADMIN — AMLODIPINE BESYLATE 5 MG: 5 TABLET ORAL at 05:39

## 2024-01-01 RX ADMIN — FINASTERIDE 5 MG: 5 TABLET, FILM COATED ORAL at 06:16

## 2024-01-01 RX ADMIN — TRAZODONE HYDROCHLORIDE 50 MG: 50 TABLET ORAL at 20:54

## 2024-01-01 RX ADMIN — ASPIRIN 81 MG: 81 TABLET, COATED ORAL at 22:50

## 2024-01-01 RX ADMIN — TAMSULOSIN HYDROCHLORIDE 0.4 MG: 0.4 CAPSULE ORAL at 08:52

## 2024-01-01 RX ADMIN — AMLODIPINE BESYLATE 7.5 MG: 5 TABLET ORAL at 06:16

## 2024-01-01 RX ADMIN — APIXABAN 5 MG: 5 TABLET, FILM COATED ORAL at 08:34

## 2024-01-01 RX ADMIN — DOCUSATE SODIUM 50MG AND SENNOSIDES 8.6MG 2 TABLET: 8.6; 5 TABLET, FILM COATED ORAL at 21:01

## 2024-01-01 RX ADMIN — MICONAZOLE NITRATE 1 APPLICATION: 20 CREAM TOPICAL at 22:48

## 2024-01-01 RX ADMIN — TAMSULOSIN HYDROCHLORIDE 0.4 MG: 0.4 CAPSULE ORAL at 08:02

## 2024-01-01 RX ADMIN — TRAZODONE HYDROCHLORIDE 50 MG: 50 TABLET ORAL at 21:27

## 2024-01-01 RX ADMIN — HUMAN ALBUMIN MICROSPHERES AND PERFLUTREN 3 ML: 10; .22 INJECTION, SOLUTION INTRAVENOUS at 16:00

## 2024-01-01 RX ADMIN — AMLODIPINE BESYLATE 7.5 MG: 5 TABLET ORAL at 05:23

## 2024-01-01 RX ADMIN — MORPHINE SULFATE 4 MG: 4 INJECTION, SOLUTION INTRAMUSCULAR; INTRAVENOUS at 16:42

## 2024-01-01 RX ADMIN — OMEPRAZOLE 20 MG: 20 CAPSULE, DELAYED RELEASE ORAL at 08:26

## 2024-01-01 RX ADMIN — DOCUSATE SODIUM 50MG AND SENNOSIDES 8.6MG 2 TABLET: 8.6; 5 TABLET, FILM COATED ORAL at 08:12

## 2024-01-01 RX ADMIN — ASPIRIN 81 MG: 81 TABLET, COATED ORAL at 20:39

## 2024-01-01 RX ADMIN — APIXABAN 5 MG: 5 TABLET, FILM COATED ORAL at 17:22

## 2024-01-01 RX ADMIN — INSULIN LISPRO 2 UNITS: 100 INJECTION, SOLUTION INTRAVENOUS; SUBCUTANEOUS at 20:45

## 2024-01-01 RX ADMIN — INSULIN GLARGINE-YFGN 10 UNITS: 100 INJECTION, SOLUTION SUBCUTANEOUS at 08:36

## 2024-01-01 RX ADMIN — Medication 1000 UNITS: at 08:34

## 2024-01-01 RX ADMIN — Medication 1000 UNITS: at 16:23

## 2024-01-01 RX ADMIN — TAMSULOSIN HYDROCHLORIDE 0.4 MG: 0.4 CAPSULE ORAL at 09:21

## 2024-01-01 RX ADMIN — OXYCODONE HYDROCHLORIDE 10 MG: 10 TABLET ORAL at 09:28

## 2024-01-01 RX ADMIN — DOCUSATE SODIUM 50MG AND SENNOSIDES 8.6MG 2 TABLET: 8.6; 5 TABLET, FILM COATED ORAL at 20:40

## 2024-01-01 RX ADMIN — MICONAZOLE NITRATE: 20 CREAM TOPICAL at 21:01

## 2024-01-01 RX ADMIN — FINASTERIDE 5 MG: 5 TABLET, FILM COATED ORAL at 05:30

## 2024-01-01 RX ADMIN — ASPIRIN 81 MG: 81 TABLET, COATED ORAL at 21:45

## 2024-01-01 RX ADMIN — INSULIN GLARGINE-YFGN 10 UNITS: 100 INJECTION, SOLUTION SUBCUTANEOUS at 08:29

## 2024-01-01 RX ADMIN — ASPIRIN 81 MG: 81 TABLET, COATED ORAL at 20:40

## 2024-01-01 RX ADMIN — DOCUSATE SODIUM 50MG AND SENNOSIDES 8.6MG 2 TABLET: 8.6; 5 TABLET, FILM COATED ORAL at 17:23

## 2024-01-01 RX ADMIN — ACETAMINOPHEN 650 MG: 325 TABLET ORAL at 21:08

## 2024-01-01 RX ADMIN — OXYCODONE 5 MG: 5 TABLET ORAL at 09:22

## 2024-01-01 RX ADMIN — AMLODIPINE BESYLATE 2.5 MG: 5 TABLET ORAL at 17:07

## 2024-01-01 RX ADMIN — FAMOTIDINE 20 MG: 20 TABLET, FILM COATED ORAL at 08:51

## 2024-01-01 RX ADMIN — OXYCODONE HYDROCHLORIDE 10 MG: 10 TABLET ORAL at 11:22

## 2024-01-01 RX ADMIN — ASPIRIN 81 MG: 81 TABLET, COATED ORAL at 20:37

## 2024-01-01 RX ADMIN — MICONAZOLE NITRATE: 20 CREAM TOPICAL at 22:52

## 2024-01-01 RX ADMIN — OMEPRAZOLE 20 MG: 20 CAPSULE, DELAYED RELEASE ORAL at 08:11

## 2024-01-01 RX ADMIN — FINASTERIDE 5 MG: 5 TABLET, FILM COATED ORAL at 05:37

## 2024-01-01 RX ADMIN — OXYCODONE 5 MG: 5 TABLET ORAL at 14:37

## 2024-01-01 RX ADMIN — ASPIRIN 81 MG: 81 TABLET, COATED ORAL at 21:23

## 2024-01-01 RX ADMIN — OMEPRAZOLE 20 MG: 20 CAPSULE, DELAYED RELEASE ORAL at 08:15

## 2024-01-01 RX ADMIN — ONDANSETRON 4 MG: 4 TABLET, ORALLY DISINTEGRATING ORAL at 19:08

## 2024-01-01 RX ADMIN — DOCUSATE SODIUM 50MG AND SENNOSIDES 8.6MG 2 TABLET: 8.6; 5 TABLET, FILM COATED ORAL at 20:54

## 2024-01-01 RX ADMIN — OXYCODONE HYDROCHLORIDE 10 MG: 10 TABLET ORAL at 21:01

## 2024-01-01 RX ADMIN — OXYCODONE 5 MG: 5 TABLET ORAL at 17:35

## 2024-01-01 RX ADMIN — OXYCODONE HYDROCHLORIDE 10 MG: 10 TABLET ORAL at 14:18

## 2024-01-01 RX ADMIN — Medication 1000 UNITS: at 08:26

## 2024-01-01 RX ADMIN — INSULIN GLARGINE-YFGN 10 UNITS: 100 INJECTION, SOLUTION SUBCUTANEOUS at 08:28

## 2024-01-01 RX ADMIN — MICONAZOLE NITRATE: 20 CREAM TOPICAL at 10:29

## 2024-01-01 RX ADMIN — Medication 1000 UNITS: at 08:04

## 2024-01-01 RX ADMIN — APIXABAN 5 MG: 5 TABLET, FILM COATED ORAL at 08:04

## 2024-01-01 RX ADMIN — Medication 1000 UNITS: at 08:15

## 2024-01-01 RX ADMIN — MICONAZOLE NITRATE: 20 CREAM TOPICAL at 08:12

## 2024-01-01 RX ADMIN — MICONAZOLE NITRATE: 20 CREAM TOPICAL at 08:35

## 2024-01-01 RX ADMIN — TAMSULOSIN HYDROCHLORIDE 0.4 MG: 0.4 CAPSULE ORAL at 05:45

## 2024-01-01 RX ADMIN — DOCUSATE SODIUM 50MG AND SENNOSIDES 8.6MG 2 TABLET: 8.6; 5 TABLET, FILM COATED ORAL at 08:11

## 2024-01-01 RX ADMIN — TAMSULOSIN HYDROCHLORIDE 0.4 MG: 0.4 CAPSULE ORAL at 09:10

## 2024-01-01 RX ADMIN — DOCUSATE SODIUM 50MG AND SENNOSIDES 8.6MG 2 TABLET: 8.6; 5 TABLET, FILM COATED ORAL at 09:12

## 2024-01-01 RX ADMIN — DOCUSATE SODIUM 50MG AND SENNOSIDES 8.6MG 2 TABLET: 8.6; 5 TABLET, FILM COATED ORAL at 08:26

## 2024-01-01 RX ADMIN — TAMSULOSIN HYDROCHLORIDE 0.4 MG: 0.4 CAPSULE ORAL at 08:34

## 2024-01-01 RX ADMIN — DOCUSATE SODIUM 50MG AND SENNOSIDES 8.6MG 2 TABLET: 8.6; 5 TABLET, FILM COATED ORAL at 08:52

## 2024-01-01 RX ADMIN — ASPIRIN 162 MG: 81 TABLET, COATED ORAL at 20:43

## 2024-01-01 RX ADMIN — ONDANSETRON 4 MG: 2 INJECTION INTRAMUSCULAR; INTRAVENOUS at 08:17

## 2024-01-01 RX ADMIN — INSULIN GLARGINE-YFGN 10 UNITS: 100 INJECTION, SOLUTION SUBCUTANEOUS at 05:53

## 2024-01-01 RX ADMIN — AMLODIPINE BESYLATE 7.5 MG: 5 TABLET ORAL at 05:06

## 2024-01-01 RX ADMIN — INSULIN GLARGINE-YFGN 10 UNITS: 100 INJECTION, SOLUTION SUBCUTANEOUS at 07:11

## 2024-01-01 RX ADMIN — OMEPRAZOLE 20 MG: 20 CAPSULE, DELAYED RELEASE ORAL at 08:02

## 2024-01-01 RX ADMIN — FINASTERIDE 5 MG: 5 TABLET, FILM COATED ORAL at 05:46

## 2024-01-01 SDOH — ECONOMIC STABILITY: TRANSPORTATION INSECURITY
IN THE PAST 12 MONTHS, HAS LACK OF RELIABLE TRANSPORTATION KEPT YOU FROM MEDICAL APPOINTMENTS, MEETINGS, WORK OR FROM GETTING THINGS NEEDED FOR DAILY LIVING?: NO

## 2024-01-01 SDOH — ECONOMIC STABILITY: GENERAL: NECESSITIES UNABLE TO AFFORD: MEDICAL EXPENSES

## 2024-01-01 SDOH — HEALTH STABILITY: MENTAL HEALTH: STRESS FACTORS COMMENTS: REPORTED ELEVATED PAIN

## 2024-01-01 ASSESSMENT — ENCOUNTER SYMPTOMS
PALPITATIONS: 0
PAIN: 1
ABDOMINAL PAIN: 0
SHORTNESS OF BREATH: 0
SHORTNESS OF BREATH: 0
ABDOMINAL PAIN: 0
SHORTNESS OF BREATH: 0
STOOL FREQUENCY: DAILY
SLEEP QUALITY: ADEQUATE
NAUSEA: 0
HEADACHES: 0
DRY SKIN: 1
FORGETFULNESS: 1
BRUISES/BLEEDS EASILY: 0
COUGH: 0
NAUSEA: 0
SHORTNESS OF BREATH: 0
BRUISES/BLEEDS EASILY: 0
POLYDIPSIA: 0
DIZZINESS: 0
PND: 0
POLYDIPSIA: 0
VOMITING: 0
WEIGHT LOSS: 0
SHORTNESS OF BREATH: 0
SHORTNESS OF BREATH: 1
WHEEZING: 0
CHILLS: 0
DIARRHEA: 0
FORGETFULNESS: 1
EYES NEGATIVE: 1
HALLUCINATIONS: 0
FEVER: 0
FEVER: 0
APNEIC BREATHING: 1
MOTTLING: 1
CHILLS: 0
CHILLS: 0
VOMITING: 0
FOCAL WEAKNESS: 0
POLYDIPSIA: 0
EYES NEGATIVE: 1
PALPITATIONS: 0
INCREASED FATIGUE: 1
BLURRED VISION: 0
FEVER: 0
WHEEZING: 0
PALPITATIONS: 0
PAIN LOCATION - PAIN SEVERITY: 5/10
COUGH: 0
ABDOMINAL PAIN: 0
VOMITING: 0
LIMITED RANGE OF MOTION: 1
MUSCULOSKELETAL NEGATIVE: 1
COUGH: 0
NERVOUS/ANXIOUS: 0
SHORTNESS OF BREATH: 1
MUSCLE WEAKNESS: 1
CHILLS: 0
EYES NEGATIVE: 1
DIARRHEA: 0
PALPITATIONS: 0
SYNCOPE: 0
FEVER: 0
BLURRED VISION: 0
COUGH: 0
EYE DISCHARGE: 0
CHILLS: 0
DIZZINESS: 0
SLEEP QUALITY: POOR
PALPITATIONS: 0
SUBJECTIVE PAIN PROGRESSION: UNCHANGED
SHORTNESS OF BREATH: 0
NAUSEA: 0
FOCAL WEAKNESS: 0
DIARRHEA: 0
COUGH: 0
FATIGUES EASILY: 1
FATIGUE: 1
FEVER: 0
DESCRIPTION OF MEMORY LOSS: LONG TERM
VOMITING: 0
HEADACHES: 0
FLANK PAIN: 0
MUSCULOSKELETAL NEGATIVE: 1
PAIN LOCATION: FEET/HEELS
DIZZINESS: 0
IRREGULAR HEARTBEAT: 0
NAUSEA: 0
LAST BOWEL MOVEMENT: 66910
DIARRHEA: 0
NAUSEA: 0
DIARRHEA: 0
ABDOMINAL PAIN: 0
NAUSEA: 0
DIZZINESS: 0
MUSCULOSKELETAL NEGATIVE: 1
BACK PAIN: 0
CHILLS: 0
NERVOUS/ANXIOUS: 0
NAUSEA: 0
BRUISES/BLEEDS EASILY: 0
MUSCULOSKELETAL NEGATIVE: 1
NERVOUS/ANXIOUS: 0
VOMITING: 0
PALPITATIONS: 0
FEVER: 0
CONSTIPATION: 0
ABDOMINAL PAIN: 0
BRUISES/BLEEDS EASILY: 0
SHORTNESS OF BREATH: 0
ABDOMINAL PAIN: 0
COUGH: 0
INCREASED SLEEPING: 1
DESCRIPTION OF MEMORY LOSS: SHORT TERM
WEAKNESS: 0
FEVER: 0
BLURRED VISION: 0
STRIDOR: 0
DIARRHEA: 0
SHORTNESS OF BREATH: 0
FEVER: 0
BRUISES/BLEEDS EASILY: 0
PAIN LOCATION - PAIN QUALITY: SORENESS
SHORTNESS OF BREATH: 0
POLYDIPSIA: 0
BACK PAIN: 0
BLURRED VISION: 0
FEVER: 0
DIZZINESS: 0
HALLUCINATIONS: 0
HEADACHES: 0
DIARRHEA: 0
CHILLS: 0
VOMITING: 0
BLURRED VISION: 0
VOMITING: 0
NAUSEA: 0
NERVOUS/ANXIOUS: 0
HEADACHES: 0
ABDOMINAL PAIN: 0
DECREASED ORAL INTAKE: 1
DIARRHEA: 0
COUGH: 0
NAUSEA: 1
VOMITING: 0
ABDOMINAL PAIN: 0
ORTHOPNEA: 0
HEADACHES: 0
HIGHEST PAIN SEVERITY IN PAST 24 HOURS: 5/10
CHILLS: 0
DESCRIPTION OF MEMORY LOSS: LONG TERM
FATIGUE: 1
ABDOMINAL PAIN: 0
EYE REDNESS: 0
PALPITATIONS: 0
NAUSEA: 1
FEVER: 0
NAUSEA: 0
NAUSEA: 0
DYSPNEA ON EXERTION: 0
EYES NEGATIVE: 1
VOMITING: 0
NIGHT SWEATS: 0
VOMITING: 0
MYALGIAS: 0
LAST BOWEL MOVEMENT: 66911
COUGH: 0
PAIN SEVERITY GOAL: 3/10
VOMITING: 0
COUGH: 0
DOUBLE VISION: 0
VOMITING: 0
FEVER: 0
FOCAL WEAKNESS: 1
FEVER: 0
PALPITATIONS: 0
LOWEST PAIN SEVERITY IN PAST 24 HOURS: 0/10
NAUSEA: 0
ABDOMINAL PAIN: 0
SHORTNESS OF BREATH: 0
DIZZINESS: 0
CHILLS: 0
NEAR-SYNCOPE: 0
NERVOUS/ANXIOUS: 0
PALPITATIONS: 0
VOMITING: 0
FEVER: 0
NERVOUS/ANXIOUS: 0
PALPITATIONS: 0
MYALGIAS: 0
CONSTIPATION: 0
CHILLS: 0
PERSON REPORTING PAIN: PATIENT
COUGH: 0
MEMORY LOSS: 1
FEVER: 0
BRUISES/BLEEDS EASILY: 0
ABDOMINAL PAIN: 0
SPUTUM PRODUCTION: 0
DRY SKIN: 1
DIZZINESS: 0
FEVER: 0
NAUSEA: 0
COUGH: 0
SLEEP QUALITY: POOR
DIZZINESS: 0
SHORTNESS OF BREATH: 0

## 2024-01-01 ASSESSMENT — GAIT ASSESSMENTS
ASSISTIVE DEVICE: SINGLE POINT CANE
DEVIATION: ATAXIC;INCREASED BASE OF SUPPORT
GAIT LEVEL OF ASSIST: CONTACT GUARD ASSIST
ASSISTIVE DEVICE: SINGLE POINT CANE
DEVIATION: SHUFFLED GAIT;DECREASED HEEL STRIKE;DECREASED TOE OFF;STEP TO
GAIT LEVEL OF ASSIST: MINIMAL ASSIST
DISTANCE (FEET): 150
GAIT LEVEL OF ASSIST: CONTACT GUARD ASSIST
DEVIATION: SHUFFLED GAIT
DEVIATION: SHUFFLED GAIT;DECREASED HEEL STRIKE;DECREASED TOE OFF
ASSISTIVE DEVICE: SINGLE POINT CANE
DISTANCE (FEET): 100
GAIT LEVEL OF ASSIST: CONTACT GUARD ASSIST
DEVIATION: DECREASED BASE OF SUPPORT
DISTANCE (FEET): 25
ASSISTIVE DEVICE: NONE;SINGLE POINT CANE
DISTANCE (FEET): 200
ASSISTIVE DEVICE: SINGLE POINT CANE
GAIT LEVEL OF ASSIST: CONTACT GUARD ASSIST
GAIT LEVEL OF ASSIST: MINIMAL ASSIST
GAIT LEVEL OF ASSIST: MINIMAL ASSIST
DISTANCE (FEET): 300
DEVIATION: OTHER (COMMENT);DECREASED BASE OF SUPPORT
DISTANCE (FEET): 150
GAIT LEVEL OF ASSIST: CONTACT GUARD ASSIST
ASSISTIVE DEVICE: SINGLE POINT CANE
DISTANCE (FEET): 30
GAIT LEVEL OF ASSIST: STANDBY ASSIST
DISTANCE (FEET): 20
ASSISTIVE DEVICE: SINGLE POINT CANE
DISTANCE (FEET): 200
ASSISTIVE DEVICE: SINGLE POINT CANE
ASSISTIVE DEVICE: SINGLE POINT CANE
GAIT LEVEL OF ASSIST: CONTACT GUARD ASSIST
DISTANCE (FEET): 150
ASSISTIVE DEVICE: SINGLE POINT CANE
GAIT LEVEL OF ASSIST: CONTACT GUARD ASSIST
ASSISTIVE DEVICE: SINGLE POINT CANE

## 2024-01-01 ASSESSMENT — LIFESTYLE VARIABLES
HAVE YOU EVER FELT YOU SHOULD CUT DOWN ON YOUR DRINKING: NO
HOW MANY TIMES IN THE PAST YEAR HAVE YOU HAD 5 OR MORE DRINKS IN A DAY: 0
TOTAL SCORE: 0
TOTAL SCORE: 0
AVERAGE NUMBER OF DAYS PER WEEK YOU HAVE A DRINK CONTAINING ALCOHOL: 0
TOTAL SCORE: 0
EVER FELT BAD OR GUILTY ABOUT YOUR DRINKING: NO
HOW MANY TIMES IN THE PAST YEAR HAVE YOU HAD 5 OR MORE DRINKS IN A DAY: 0
CONSUMPTION TOTAL: NEGATIVE
EVER_SMOKED: NEVER
ALCOHOL_USE: NO
TOTAL SCORE: 0
ALCOHOL_USE: NO
EVER HAD A DRINK FIRST THING IN THE MORNING TO STEADY YOUR NERVES TO GET RID OF A HANGOVER: NO
HAVE YOU EVER FELT YOU SHOULD CUT DOWN ON YOUR DRINKING: NO
TOTAL SCORE: 0
TOTAL SCORE: 0
ON A TYPICAL DAY WHEN YOU DRINK ALCOHOL HOW MANY DRINKS DO YOU HAVE: 0
HAVE PEOPLE ANNOYED YOU BY CRITICIZING YOUR DRINKING: NO
ON A TYPICAL DAY WHEN YOU DRINK ALCOHOL HOW MANY DRINKS DO YOU HAVE: 0
EVER FELT BAD OR GUILTY ABOUT YOUR DRINKING: NO
CONSUMPTION TOTAL: NEGATIVE
AVERAGE NUMBER OF DAYS PER WEEK YOU HAVE A DRINK CONTAINING ALCOHOL: 0
EVER HAD A DRINK FIRST THING IN THE MORNING TO STEADY YOUR NERVES TO GET RID OF A HANGOVER: NO
HAVE PEOPLE ANNOYED YOU BY CRITICIZING YOUR DRINKING: NO

## 2024-01-01 ASSESSMENT — ACTIVITIES OF DAILY LIVING (ADL)
TOILETING: INDEPENDENT
TOILETING: INDEPENDENT
BED_CHAIR_WHEELCHAIR_TRANSFER_DESCRIPTION: ADAPTIVE EQUIPMENT
BED_CHAIR_WHEELCHAIR_TRANSFER_DESCRIPTION: INCREASED TIME;INITIAL PREPARATION FOR TASK;SUPERVISION FOR SAFETY
BED_CHAIR_WHEELCHAIR_TRANSFER_DESCRIPTION: INCREASED TIME;SUPERVISION FOR SAFETY
MONEY MANAGEMENT (EXPENSES/BILLS): NEEDS ASSISTANCE
BATHING_REQUIRES_ASSISTANCE: 1
BED_CHAIR_WHEELCHAIR_TRANSFER_DESCRIPTION: VERBAL CUEING;SUPERVISION FOR SAFETY;INCREASED TIME;ADAPTIVE EQUIPMENT
TOILET_TRANSFER_DESCRIPTION: GRAB BAR;INCREASED TIME;SUPERVISION FOR SAFETY
BED_CHAIR_WHEELCHAIR_TRANSFER_DESCRIPTION: ADAPTIVE EQUIPMENT;INCREASED TIME;INITIAL PREPARATION FOR TASK;VERBAL CUEING;SUPERVISION FOR SAFETY
SHOPPING_REQUIRES_ASSISTANCE: 1
BATHING_REQUIRES_ASSISTANCE: 1
LAUNDRY_REQUIRES_ASSISTANCE: 1
AMBULATION_REQUIRES_ASSISTANCE: 1
TOILET_TRANSFER_DESCRIPTION: INCREASED TIME;GRAB BAR;SUPERVISION FOR SAFETY
BED_CHAIR_WHEELCHAIR_TRANSFER_DESCRIPTION: SUPERVISION FOR SAFETY;SET-UP OF EQUIPMENT;VERBAL CUEING
BATHING_REQUIRES_ASSISTANCE: 1
BED_CHAIR_WHEELCHAIR_TRANSFER_DESCRIPTION: INCREASED TIME;INITIAL PREPARATION FOR TASK;SUPERVISION FOR SAFETY
TOILETING_LEVEL_OF_ASSIST_DESCRIPTION: GRAB BAR;INCREASED TIME;SUPERVISION FOR SAFETY;VERBAL CUEING
BED_CHAIR_WHEELCHAIR_TRANSFER_DESCRIPTION: ADAPTIVE EQUIPMENT;INCREASED TIME;INITIAL PREPARATION FOR TASK;SUPERVISION FOR SAFETY;VERBAL CUEING
TOILET_TRANSFER_DESCRIPTION: GRAB BAR;INCREASED TIME;SUPERVISION FOR SAFETY
BED_CHAIR_WHEELCHAIR_TRANSFER_DESCRIPTION: INCREASED TIME;SET-UP OF EQUIPMENT;SUPERVISION FOR SAFETY
EATING_REQUIRES_ASSISTANCE: 1
TOILET_TRANSFER_DESCRIPTION: GRAB BAR;ADAPTIVE EQUIPMENT;INCREASED TIME;SUPERVISION FOR SAFETY;VERBAL CUEING
TOILET_TRANSFER_DESCRIPTION: SUPERVISION FOR SAFETY;GRAB BAR;INCREASED TIME
TOILET_TRANSFER_DESCRIPTION: GRAB BAR;SUPERVISION FOR SAFETY
AMBULATION_REQUIRES_ASSISTANCE: 1
TOILET_TRANSFER_DESCRIPTION: GRAB BAR;ADAPTIVE EQUIPMENT;INCREASED TIME;SUPERVISION FOR SAFETY
TOILETING_LEVEL_OF_ASSIST_DESCRIPTION: GRAB BAR;INCREASED TIME;SUPERVISION FOR SAFETY
TOILET_TRANSFER_DESCRIPTION: GRAB BAR;ADAPTIVE EQUIPMENT;SUPERVISION FOR SAFETY
CURRENT_FUNCTION: STAND BY ASSIST
BED_CHAIR_WHEELCHAIR_TRANSFER_DESCRIPTION: SET-UP OF EQUIPMENT;INCREASED TIME;VERBAL CUEING
BED_CHAIR_WHEELCHAIR_TRANSFER_DESCRIPTION: INCREASED TIME;VERBAL CUEING
BED_CHAIR_WHEELCHAIR_TRANSFER_DESCRIPTION: INCREASED TIME;INITIAL PREPARATION FOR TASK;SUPERVISION FOR SAFETY
CONTINENCE_REQUIRES_ASSISTANCE: 1
TOILETING_REQUIRES_ASSISTANCE: 1
PHYSICAL_TRANSFER_REQUIRES_ASSISTANCE: 1
TOILETING_LEVEL_OF_ASSIST_DESCRIPTION: GRAB BAR;INCREASED TIME;SUPERVISION FOR SAFETY
BED_CHAIR_WHEELCHAIR_TRANSFER_DESCRIPTION: ADAPTIVE EQUIPMENT;SUPERVISION FOR SAFETY;VERBAL CUEING
EATING_REQUIRES_ASSISTANCE: 1
GROOMING_REQUIRES_ASSISTANCE: 1
DRESSING_REQUIRES_ASSISTANCE: 1
PHYSICAL_TRANSFER_REQUIRES_ASSISTANCE: 1
BED_CHAIR_WHEELCHAIR_TRANSFER_DESCRIPTION: INCREASED TIME;SUPERVISION FOR SAFETY
DRESSING_REQUIRES_ASSISTANCE: 1
TUB_SHOWER_TRANSFER_DESCRIPTION: ADAPTIVE EQUIPMENT;SET-UP OF EQUIPMENT;SUPERVISION FOR SAFETY;VERBAL CUEING
AMBULATION ASSISTANCE: STAND BY ASSIST

## 2024-01-01 ASSESSMENT — BRIEF INTERVIEW FOR MENTAL STATUS (BIMS)
BIMS SUMMARY SCORE: 99
INITIAL REPETITION OF BED BLUE SOCK - FIRST ATTEMPT: NO ANSWER
BIMS SUMMARY SCORE: 99
GENERAL MEMORY AND RECALL ABILITY: RECOGNIZES APPROPRIATE HEALTHCARE SETTING
WHAT YEAR IS IT: NO ANSWER
INITIAL REPETITION OF BED BLUE SOCK - FIRST ATTEMPT: 3
WHAT DAY OF THE WEEK IS IT: NO ANSWER
WHAT DAY OF THE WEEK IS IT: CORRECT
ASKED TO RECALL SOCK: YES, NO CUE REQUIRED
ASKED TO RECALL BLUE: NO ANSWER
ASKED TO RECALL BLUE: YES, AFTER CUEING (A COLOR")"
WHAT MONTH IS IT: NO ANSWER
INITIAL REPETITION OF BED BLUE SOCK - FIRST ATTEMPT: NO ANSWER
ASKED TO RECALL BED: NO ANSWER
BIMS SUMMARY SCORE: 12
ASKED TO RECALL BED: NO, COULD NOT RECALL
COGNITIVE PATTERN ASSESSMENT USED: BIMS;STAFF ASSESSMENT
ASKED TO RECALL SOCK: NO, COULD NOT RECALL
WHAT MONTH IS IT: NO ANSWER
WHAT MONTH IS IT: ACCURATE WITHIN 5 DAYS
WHAT YEAR IS IT: CORRECT
ASKED TO RECALL BED: NO, COULD NOT RECALL
ASKED TO RECALL SOCK: NO ANSWER
ASKED TO RECALL BLUE: NO, COULD NOT RECALL
WHAT DAY OF THE WEEK IS IT: NO ANSWER
WHAT YEAR IS IT: NO ANSWER

## 2024-01-01 ASSESSMENT — PATIENT HEALTH QUESTIONNAIRE - PHQ9
SUM OF ALL RESPONSES TO PHQ9 QUESTIONS 1 AND 2: 0
1. LITTLE INTEREST OR PLEASURE IN DOING THINGS: NOT AT ALL
2. FEELING DOWN, DEPRESSED, IRRITABLE, OR HOPELESS: NOT AT ALL
2. FEELING DOWN, DEPRESSED, IRRITABLE, OR HOPELESS: NOT AT ALL
CLINICAL INTERPRETATION OF PHQ2 SCORE: 0
2. FEELING DOWN, DEPRESSED, IRRITABLE, OR HOPELESS: NOT AT ALL
SUM OF ALL RESPONSES TO PHQ9 QUESTIONS 1 AND 2: 0
2. FEELING DOWN, DEPRESSED, IRRITABLE, OR HOPELESS: NOT AT ALL
2. FEELING DOWN, DEPRESSED, IRRITABLE, OR HOPELESS: NOT AT ALL
SUM OF ALL RESPONSES TO PHQ9 QUESTIONS 1 AND 2: 0
1. LITTLE INTEREST OR PLEASURE IN DOING THINGS: NOT AT ALL

## 2024-01-01 ASSESSMENT — PAIN DESCRIPTION - PAIN TYPE
TYPE: CHRONIC PAIN
TYPE: ACUTE PAIN
TYPE: ACUTE PAIN
TYPE: CHRONIC PAIN
TYPE: ACUTE PAIN
TYPE: CHRONIC PAIN
TYPE: ACUTE PAIN
TYPE: CHRONIC PAIN
TYPE: ACUTE PAIN

## 2024-01-01 ASSESSMENT — PAIN SCALES - PAIN ASSESSMENT IN ADVANCED DEMENTIA (PAINAD)
CONSOLABILITY: 0 - NO NEED TO CONSOLE.
FACIALEXPRESSION: 0 - SMILING OR INEXPRESSIVE.
TOTALSCORE: 3
NEGVOCALIZATION: 1 - OCCASIONAL MOAN OR GROAN. LOW-LEVEL SPEECH WITH A NEGATIVE OR DISAPPROVING QUALITY.
FACIALEXPRESSION: 2 - FACIAL GRIMACING.
BODYLANGUAGE: 0 - RELAXED.
CONSOLABILITY: 1 - DISTRACTED OR REASSURED BY VOICE OR TOUCH.
TOTALSCORE: 1
BODYLANGUAGE: 2 - RIGID. FISTS CLENCHED. KNEES PULLED UP. PULLING OR PUSHING AWAY. STRIKING OUT.
CONSOLABILITY: 0 - NO NEED TO CONSOLE.
TOTALSCORE: 8
BODYLANGUAGE: 1 - TENSE. DISTRESSED PACING. FIDGETING.
FACIALEXPRESSION: 1 - SAD. FRIGHTENED. FROWN.
NEGVOCALIZATION: 1 - OCCASIONAL MOAN OR GROAN. LOW-LEVEL SPEECH WITH A NEGATIVE OR DISAPPROVING QUALITY.
NEGVOCALIZATION: 1 - OCCASIONAL MOAN OR GROAN. LOW-LEVEL SPEECH WITH A NEGATIVE OR DISAPPROVING QUALITY.

## 2024-01-01 ASSESSMENT — CHA2DS2 SCORE
SEX: MALE
CHF OR LEFT VENTRICULAR DYSFUNCTION: YES
VASCULAR DISEASE: YES
DIABETES: YES
VASCULAR DISEASE: YES
PRIOR STROKE OR TIA OR THROMBOEMBOLISM: YES
AGE 75 OR GREATER: NO
AGE 65 TO 74: YES
CHA2DS2 VASC SCORE: 7
DIABETES: YES
PRIOR STROKE OR TIA OR THROMBOEMBOLISM: YES
SEX: MALE
CHA2DS2 VASC SCORE: 7
HYPERTENSION: YES
CHF OR LEFT VENTRICULAR DYSFUNCTION: YES
AGE 75 OR GREATER: NO
HYPERTENSION: YES
AGE 65 TO 74: YES

## 2024-01-01 ASSESSMENT — COGNITIVE AND FUNCTIONAL STATUS - GENERAL
MOBILITY SCORE: 17
SUGGESTED CMS G CODE MODIFIER MOBILITY: CK
TOILETING: A LITTLE
MOBILITY SCORE: 19
MOVING FROM LYING ON BACK TO SITTING ON SIDE OF FLAT BED: A LITTLE
CLIMB 3 TO 5 STEPS WITH RAILING: A LOT
CLIMB 3 TO 5 STEPS WITH RAILING: A LOT
DRESSING REGULAR LOWER BODY CLOTHING: A LITTLE
WALKING IN HOSPITAL ROOM: A LOT
WALKING IN HOSPITAL ROOM: A LITTLE
TOILETING: A LITTLE
STANDING UP FROM CHAIR USING ARMS: A LITTLE
DRESSING REGULAR LOWER BODY CLOTHING: A LITTLE
SUGGESTED CMS G CODE MODIFIER DAILY ACTIVITY: CJ
SUGGESTED CMS G CODE MODIFIER DAILY ACTIVITY: CJ
HELP NEEDED FOR BATHING: A LITTLE
PERSONAL GROOMING: A LITTLE
STANDING UP FROM CHAIR USING ARMS: A LITTLE
DAILY ACTIVITIY SCORE: 22
DAILY ACTIVITIY SCORE: 20
DAILY ACTIVITIY SCORE: 21
WALKING IN HOSPITAL ROOM: A LITTLE
MOVING TO AND FROM BED TO CHAIR: A LITTLE
SUGGESTED CMS G CODE MODIFIER MOBILITY: CK
MOVING FROM LYING ON BACK TO SITTING ON SIDE OF FLAT BED: A LITTLE
MOVING TO AND FROM BED TO CHAIR: A LITTLE
STANDING UP FROM CHAIR USING ARMS: A LITTLE
TOILETING: A LITTLE
MOVING FROM LYING ON BACK TO SITTING ON SIDE OF FLAT BED: A LITTLE
MOBILITY SCORE: 18
CLIMB 3 TO 5 STEPS WITH RAILING: A LOT
SUGGESTED CMS G CODE MODIFIER MOBILITY: CK
HELP NEEDED FOR BATHING: A LITTLE
DRESSING REGULAR LOWER BODY CLOTHING: A LITTLE
SUGGESTED CMS G CODE MODIFIER DAILY ACTIVITY: CJ

## 2024-01-01 ASSESSMENT — BALANCE ASSESSMENTS
STANDING ON ONE LEG: 1
STANDING UNSUPPORTED WITH EYES CLOSED: 3
LONG VERSION TOTAL SCORE (MAX 56): 35
MEDICARE IMPAIRMENT PERCENTAGE: 38
PICK UP OBJECT FROM THE FLOOR FROM A STANDING POSITION: 3
SITTING TO STANDING: 4
TURN 360 DEGREES: 1
STANDING TO SITTING: 4
STANDING UNSUPPORTED ONE FOOT IN FRONT: 3
SITTING UNSUPPORTED: 4
TRANSFERS: 2
PLACE ALTERNATE FOOT ON STEP OR STOOL WHILE STANDING UNSUPPORTED: 1
STANDING UNSUPPORTED: 3
REACHING FORWARD WITH OUTSTRETCHED ARM WHILE STANDING: 2
LOOK OVER LEFT AND RIGHT SHOULDERS WHILE STANDING: 1
LONG VERSION TOTAL SCORE (MAX 56): 35
STANDING UNSUPPORTED WITH FEET TOGETHER: 3

## 2024-01-01 ASSESSMENT — SOCIAL DETERMINANTS OF HEALTH (SDOH)
ACTIVE STRESSOR - LOSS OF CONTROL: 1
ACTIVE STRESSOR - HEALTH CHANGES: 1
EXPRESSED_FINANCIAL_NEED: 1
ACTIVE STRESSOR - LOSS OF CONTROL: 1
ACTIVE STRESSOR - HEALTH CHANGES: 1

## 2024-01-01 ASSESSMENT — COPD QUESTIONNAIRES
DURING THE PAST 4 WEEKS HOW MUCH DID YOU FEEL SHORT OF BREATH: NONE/LITTLE OF THE TIME
HAVE YOU SMOKED AT LEAST 100 CIGARETTES IN YOUR ENTIRE LIFE: NO/DON'T KNOW
DO YOU EVER COUGH UP ANY MUCUS OR PHLEGM?: NO/ONLY WITH OCCASIONAL COLDS OR INFECTIONS
COPD SCREENING SCORE: 2

## 2024-01-01 ASSESSMENT — FIBROSIS 4 INDEX
FIB4 SCORE: 1.11
FIB4 SCORE: 0.93
FIB4 SCORE: 0.49
FIB4 SCORE: 0.81
FIB4 SCORE: 0.81
FIB4 SCORE: 1.11
FIB4 SCORE: 1.19

## 2024-01-24 NOTE — PROGRESS NOTES
"Arrhythmia Clinic Note (Established patient)    DOS: 1/24/2024    Chief complaint/Reason for consult: Heart failure, ICM    Interval History: 74 y/o M with ICM 25% CAD s/p MI. Complaining of new exertional fatigue and lateral chest wall pain. Prior ICD implantation transvenous complicated by infection, extraction, then reimplantation of an S-ICD. He was seen and managed by heart failure service at [facility]. He has been told to resume following up with his cardiology team however he refuses to see Dr Morales again. He instead scheduled a FV with me. He notes intermittent chest pain, left sided, which is new. No such pain after SICD implant. New exertional SOB and fatigue. He has palpitations. He is not on GDMT for heart failure currently.     ROS (+ highlighted in bold):  Constitutional: Fevers/chills/fatigue/weightloss  HEENT: Blurry vision/eye pain/sore throat/hearing loss  Respiratory: Shortness of breath/cough  Cardiovascular: Chest pain/palpitations/edema/orthopnea/syncope  GI: Nausea/vomitting/diarrhea  MSK: Arthralgias/myagias/muscle weakness  Skin: Rash/sores  Neurological: Numbness/tremors/vertigo  Endocrine: Excessive thirst/polyuria/cold intolerance/heat intolerance  Psych: Depression/anxiety    Past Medical History:   Diagnosis Date    Acute bacterial endocarditis 11/02/2021    Agent orange exposure     Anesthesia     resistant to pain meds and \"numbing medications\"    Cancer (HCC)     polycythemia vera    Catheter-associated urinary tract infection (HCC) 08/09/2022    This is a new problem, patient has had urinary catheter inserted in emergency room 4 weeks ago due to urinary retention.  He has been experiencing subjective fever, right flank pain.  He tells me he has experienced similar symptoms are when he was diagnosed with urinary tract infection last time.          Diabetes (HCC)     insulin and oral meds    Family history of punctured lung 2002    left lung    Heart attack (HCC) 03/2017    Followed by " [facility]    Hemorrhagic disorder (HCC)     on blood thinners    High cholesterol     Ischemic cardiomyopathy     Kidney stones     hx of kidney stones    Lupus ([facility]) 11/15/2019    Orthostatic hypotension 03/29/2018    Pain     headache pain    Polycythemia vera(238.4)     Stroke ([facility]) 2016    r/t afib; eye issues resolved afterward    Urinary bladder disorder     enlarged prostate, weak stream, difficulty urinating    Vertigo        Past Surgical History:   Procedure Laterality Date    ID DX BONE MARROW BIOPSIES Left 11/8/2023    Procedure: BIOPSY, BONE MARROW, USING NEEDLE OR TROCAR;  Surgeon: Ingrid Zambrano M.D.;  Location: SURGERY SAME DAY [facility];  Service: Orthopedics    BONE ASPIRATION BIOPSY Left 11/8/2023    Procedure: BONE MARROW BIOPSY AND ASPIRATION - DR. ANDERSON;  Surgeon: Ingrid Zambrano M.D.;  Location: SURGERY SAME DAY [facility];  Service: Orthopedics    PAMELA N/A 10/30/2021    Procedure: ECHOCARDIOGRAM, TRANSESOPHAGEAL;  Surgeon: Beau Gutierrez M.D.;  Location: [facility];  Service: Cardiac    AICD IMPLANT  07/29/2021    [employer] [id number] implanted by Dr. Isaac.    SPLIT THICKNESS SKIN GRAFT N/A 8/23/2020    Procedure: APPLICATION, GRAFT, SKIN, SPLIT-THICKNESS;  Surgeon: Elisa Craig M.D.;  Location: [facility];  Service: Plastics    SKIN ABSCESS INCISION AND DRAINAGE Right 8/3/2020    Procedure: INCISION AND DRAINAGE - SCROTAL WOUND - WOUND VAC PLACEMENT;  Surgeon: Jaylen Hayes M.D.;  Location: [facility];  Service: Urology    ID EXPLORE SCROTUM Right 7/31/2020    Procedure: EXPLORATION, SCROTUM - FOR WASH OUT OF SCROTAL WOUND & WOUND VAC PLACEMENT;  Surgeon: Ferny Rosales M.D.;  Location: [facility];  Service: Urology    ID EXPLORE SCROTUM Right 7/29/2020    Procedure: EXPLORATION, SCROTUM - FOR I & D OF SCROTAL AND  GROIN WOUND;  Surgeon: Donta Viera M.D.;  Location: [facility];  Service: Urology    ID  INCIS/DRAIN SCROTUM/TESTIS,EPIDIDYM Right 7/25/2020    Procedure: INCISION AND DRAINAGE, SCROTUM;  Surgeon: Nick Negro M.D.;  Location: SURGERY [facility];  Service: Urology    TEMPORAL ARTERY BIOPSY Right 6/26/2018    Procedure: TEMPORAL ARTERY BIOPSY;  Surgeon: Rajendra Aguilar M.D.;  Location: SURGERY SAME DAY [facility];  Service: General    CAROTID ENDARTERECTOMY Right 3/21/2018    Procedure: CAROTID ENDARTERECTOMY- W/EEG MONITORING;  Surgeon: Benny Morfin M.D.;  Location: SURGERY [facility];  Service: General    APPENDECTOMY      CATH PLACEMENT      right arm    LAMINOTOMY      diskectomy; C4    OTHER CARDIAC SURGERY      stents x 3       Social History     Socioeconomic History    Marital status:      Spouse name: Not on file    Number of children: Not on file    Years of education: Not on file    Highest education level: Not on file   Occupational History    Not on file   Tobacco Use    Smoking status: Never    Smokeless tobacco: Never   Vaping Use    Vaping Use: Never used   Substance and Sexual Activity    Alcohol use: No    Drug use: No    Sexual activity: Not on file   Other Topics Concern    Not on file   Social History Narrative    Not on file     Social Determinants of Health     Financial Resource Strain: Low Risk  (8/25/2022)    Overall Financial Resource Strain (CARDIA)     Difficulty of Paying Living Expenses: Not hard at all   Food Insecurity: No Food Insecurity (8/25/2022)    Hunger Vital Sign     Worried About Running Out of Food in the Last Year: Never true     Ran Out of Food in the Last Year: Never true   Transportation Needs: No Transportation Needs (8/25/2022)    PRAPARE - Transportation     Lack of Transportation (Medical): No     Lack of Transportation (Non-Medical): No   Physical Activity: Not on file   Stress: Not on file   Social Connections: Not on file   Intimate Partner Violence: Not on file   Housing Stability: Not on file       Family History  "  Problem Relation Age of Onset    Ovarian Cancer Neg Hx     Diabetes Neg Hx        Allergies   Allergen Reactions    Atorvastatin      States he  when he took it and required life saving measures    Doxycycline      Swelling lips and difficulties swallowing    Lisinopril Anaphylaxis     Pt coded    Simvastatin      States he  while taking a statin and required life saving measures    Statins [Hmg-Coa-R Inhibitors]      States he  while taking a statin and required life saving measures    Beta Adrenergic Blockers Unspecified     dizziness    Eplerenone Unspecified     Intolerance, dehydration, altered mental status    Metformin Palpitations and Unspecified     Cardiac effects  \"Similar to a heart attack, I was hospitalized\"    Spironolactone Palpitations       Current Outpatient Medications   Medication Sig Dispense Refill    apixaban (ELIQUIS) 5mg Tab Take 1 Tablet by mouth 2 times a day. 180 Tablet 3    finasteride (PROSCAR) 5 MG Tab TAKE 1 TABLET BY MOUTH EVERY DAY 90 Tablet 1    tamsulosin (FLOMAX) 0.4 MG capsule Take 1 Capsule by mouth every day. 90 Capsule 1    dicyclomine (BENTYL) 10 MG Cap TAKE 1 CAPSULE BY MOUTH EVERY 6 HOURS AS NEEDED (ABDOMINAL CRAMPS). 360 Capsule 3    ondansetron (ZOFRAN ODT) 4 MG TABLET DISPERSIBLE Take 1 Tablet by mouth every four hours as needed for Nausea/Vomiting (give PO if IV route is unavailable.). 10 Tablet 0    apixaban (ELIQUIS) 5mg Tab Take 1 Tablet by mouth 2 times a day. 180 Tablet 0    Insulin Degludec (TRESIBA FLEXTOUCH) 100 UNIT/ML Solution Pen-injector Inject 10 Units under the skin at bedtime. 15 mL 6    dapagliflozin propanediol (FARXIGA) 10 MG Tab Take 0.5 Tablets by mouth every day. 45 Tablet 1    hydroxyurea (HYDREA) 500 MG Cap Take 500 mg by mouth 2 times a day.       No current facility-administered medications for this visit.       Physical Exam:  Vitals:    24 0736   BP: 120/86   BP Location: Left arm   Patient Position: Sitting   BP Cuff " "Size: Adult   Pulse: 75   Resp: 12   SpO2: 99%   Weight: 84.4 kg (186 lb)   Height: 1.88 m (6' 2\")     General appearance: NAD, conversant   Eyes: anicteric sclerae, moist conjunctivae; no lid-lag; PERRLA  HENT: Atraumatic; oropharynx clear with moist mucous membranes and no mucosal ulcerations; normal hard and soft palate  Neck: Trachea midline; FROM, supple, no thyromegaly or lymphadenopathy  Lungs: CTA, with normal respiratory effort and no intercostal retractions  CV: RRR, no MRGs, no JVD  Abdomen: Soft, non-tender; no masses or HSM  Extremities: No peripheral edema or extremity lymphadenopathy  Skin: Normal temperature, turgor and texture; no rash, ulcers or subcutaneous nodules  Psych: Appropriate affect, alert and oriented to person, place and time    Data:  Lipids:   Lab Results   Component Value Date/Time    CHOLSTRLTOT 155 08/02/2023 09:52 AM    TRIGLYCERIDE 77 08/02/2023 09:52 AM    HDL 43 08/02/2023 09:52 AM    LDL 97 08/02/2023 09:52 AM        BMP:  Lab Results   Component Value Date/Time    SODIUM 137 08/02/2023 0952    POTASSIUM 4.1 08/02/2023 0952    CHLORIDE 105 08/02/2023 0952    CO2 21 08/02/2023 0952    GLUCOSE 127 (H) 08/02/2023 0952    BUN 19 08/02/2023 0952    CREATININE 0.99 08/02/2023 0952    CALCIUM 9.3 08/02/2023 0952    ANION 11.0 08/02/2023 0952        TSH:   Lab Results   Component Value Date/Time    TSHULTRASEN 2.150 08/02/2023 0952        THYROXINE (T4):   No results found for: \"FREEDIR\"     CBC:   Lab Results   Component Value Date/Time    WBC 6.9 01/04/2024 01:20 PM    RBC 5.73 01/04/2024 01:20 PM    HEMOGLOBIN 16.0 01/04/2024 01:20 PM    HEMATOCRIT 49.8 01/04/2024 01:20 PM    MCV 86.9 01/04/2024 01:20 PM    MCH 27.9 01/04/2024 01:20 PM    MCHC 32.1 (L) 01/04/2024 01:20 PM    RDW 73.2 (H) 01/04/2024 01:20 PM    PLATELETCT 411 01/04/2024 01:20 PM    MPV 10.3 01/04/2024 01:20 PM    NEUTSPOLYS 82.00 (H) 01/04/2024 01:20 PM    LYMPHOCYTES 7.70 (L) 01/04/2024 01:20 PM    MONOCYTES 6.70 " 01/04/2024 01:20 PM    EOSINOPHILS 1.70 01/04/2024 01:20 PM    BASOPHILS 1.30 01/04/2024 01:20 PM    IMMGRAN 0.60 01/04/2024 01:20 PM    NRBC 0.00 01/04/2024 01:20 PM    NEUTS 5.66 01/04/2024 01:20 PM    LYMPHS 0.53 (L) 01/04/2024 01:20 PM    MONOS 0.46 01/04/2024 01:20 PM    EOS 0.12 01/04/2024 01:20 PM    BASO 0.09 01/04/2024 01:20 PM    IMMGRANAB 0.04 01/04/2024 01:20 PM    NRBCAB 0.00 01/04/2024 01:20 PM        CBC w/o DIFF  Lab Results   Component Value Date/Time    WBC 6.9 01/04/2024 01:20 PM    RBC 5.73 01/04/2024 01:20 PM    HEMOGLOBIN 16.0 01/04/2024 01:20 PM    MCV 86.9 01/04/2024 01:20 PM    MCH 27.9 01/04/2024 01:20 PM    MCHC 32.1 (L) 01/04/2024 01:20 PM    RDW 73.2 (H) 01/04/2024 01:20 PM    MPV 10.3 01/04/2024 01:20 PM       Prior echo/stress reviewed: EF 25%    EKG interpreted by me: SR with PVCs    Impression/Plan:  1. Paroxysmal A-fib (HCC)  EKG        pAF  PVCs  Systolic heart failure, chronic  ICM  CAD s/p MI  HTN  ICD in situ    - I had a long discussion with him regarding his symptoms. I tried to reinforce that he needs to see a heart failure team and general cardiologist and that his chest pain is unlikely related to his defibrillator and his new shortness of breath is alarming.  - Stat echo ordered  - Chest CT without contrast ordered  - HF clinic referral ordered next available, should trial medical therapy and have very close following    Ultimately I will not be seeing him longitudinally for his heart failure and will thus discharge him from my clinic, EP team to follow him via his device and remote monitoring    A total of 45 minutes of time was spent on day of encounter reviewing medical record, performing history and examination, counseling, ordering medication/test/consults, collaborating with referring service, and documentation.        Alberto Isaac MD  Cardiac Electrophysiology

## 2024-02-02 PROBLEM — I50.22 CHRONIC SYSTOLIC CONGESTIVE HEART FAILURE (HCC): Status: ACTIVE | Noted: 2024-01-01

## 2024-02-02 PROBLEM — G81.90 HEMIPLEGIA (HCC): Status: ACTIVE | Noted: 2024-01-01

## 2024-02-02 PROBLEM — Z71.89 ACP (ADVANCE CARE PLANNING): Status: ACTIVE | Noted: 2024-01-01

## 2024-02-02 PROBLEM — Z86.73 HISTORY OF CEREBROVASCULAR ACCIDENT: Status: ACTIVE | Noted: 2024-01-01

## 2024-02-02 NOTE — ED TRIAGE NOTES
Pt. Refuses to stand on scale for weight as well. Pt. Brought back to ED 10H and triage left incomplete as pt. Is rocking around in wheelchair, stating he is feeling that he will pass out.

## 2024-02-03 PROBLEM — I27.20 PULMONARY HYPERTENSION (HCC): Chronic | Status: ACTIVE | Noted: 2024-01-01

## 2024-02-03 PROBLEM — I16.1 HYPERTENSIVE EMERGENCY: Status: ACTIVE | Noted: 2024-01-01

## 2024-02-03 NOTE — ASSESSMENT & PLAN NOTE
Admit to Neuro, with continuous cardiac monitoring  CT, CT-angiography, head, neck showed no acute infarction, or hemorrhage    Aspiration, Fall, and seizure precautions      Due to acute left thalamic infarct.  Discharge plan to inpatient rehab.

## 2024-02-03 NOTE — ASSESSMENT & PLAN NOTE
With no significant hyperglycemia  Last glycated hemoglobin was 6%  Accuchecks, ISS and hypoglycemia protocol  Hold home Farxiga and Tresiba

## 2024-02-03 NOTE — PROGRESS NOTES
Monitor Summary:    Rhythm:  SB/SR, BBB   Rate Range:  57-70  Ectopy: rPVC/PAC    Measurements:  0.20/0.12/0.40

## 2024-02-03 NOTE — CONSULTS
This is a written report of interprofessional telephone / internet / EHR assessment and management service provided by me Dr. David Silver (as cardiology specialist), to patient's requesting physician or other qualified health care professional. Total time spent of medical consultative time is: 5 minutes.    Consulting requested by primary provider: Dr. Copeland.    Reason for consult: abnormal EKG, elevated troponin.    We discussed and I personally interpreted, reviewed all EKG tracings, clinical presentation, imaging studies, blood test results to the the extent of availability.    No ST elevation seen on EKG.  HF exacerbation and HTN emergency.  No indication for invasive intervention.  Consider end of life discussion. Hospice care seems reasonable.    At this time it was deemed no formal in person cardiology consultation was necessary, however if this changes due to changes in patient condition or abnormal test results, please re-consult cardiology.    Electronically Signed by:    Janine Silver M.D.    2/3/2024    9:03 AM

## 2024-02-03 NOTE — PROGRESS NOTES
Troponin increased from 33 to 79 this morning. EKG taken. Pt reports intermittent 3/10 chest pain that comes and goes. Vitals taken. RN notified Dr. David. Orders received.

## 2024-02-03 NOTE — PROGRESS NOTES
0715 Called MRI to verify IACD is MRI safe, awaiting response from rep.    0720 Notified RRT RN about sepsis watch alert and overnight events regarding EKG results and troponin levels trending up.    0729 Notified Dr. Copeland of EKG results and troponin levels. MD at bedside with primary RN. Received orders for new stat EKG, EKG performed and given to Dr. Copeland. Stat troponin also drawn per MD order. Awaiting MRI compatibility with IACD.    0900 Primary RN contacted MRI again, no response from [employer] rep yet. Nursing communication indicating MRI safe but MRI tech states still need to do more research on device compatibility with MRI machine.    0930 Called [phone] again to request for rep to come to [facility] for pt's IACD, no response from them still. Notified Dr. Copeland and Charge RN that this primary RN and MRI tech Vita has been continuously calling [employer] for rep to come down for stat MRI. Per Vita, [employer] requires rep to be the one to turn device to MRI safe mode on.    1000 Charge RN received call from [facility] [employer] rep that his soonest availability would be tomorrow 2/4 7-8 am and his # is [phone]. Notified Dr. Copeland.

## 2024-02-03 NOTE — THERAPY
Physical Therapy   Initial Evaluation     Patient Name: Francesco Schulte  Age:  73 y.o., Sex:  male  Medical Record #: [id number]  Today's Date: 2/3/2024     Precautions  Precautions: (P) Fall Risk  Comments: (P) high    Assessment  Patient is 73 y.o. male with a diagnosis of rule out L CVA.Pt lives at home with wife and is mod active.Wife reports he is not at baseline level of function.He is unsteady on feet and not safe to ambulate on his own.His wife works during the day so not able to care for him.Acute PT needed to improve transfers and ambulation     Plan    Physical Therapy Initial Treatment Plan   Treatment Plan : (P) Gait Training, Neuro Re-Education / Balance, Therapeutic Activities, Therapeutic Exercise  Treatment Frequency: (P) 4 Times per Week    DC Equipment Recommendations: (P) Unable to determine at this time  Discharge Recommendations: (P) Recommend post-acute placement for additional physical therapy services prior to discharge home       Objective       02/03/24 1500   Charge Group   PT Evaluation PT Evaluation Mod   Total Time Spent   PT Evaluation Time Spent (Mins) 30   Initial Contact Note    Initial Contact Note Order Received and Verified, Physical Therapy Evaluation in Progress with Full Report to Follow.   Precautions   Precautions Fall Risk   Comments high   Prior Living Situation   Prior Services None   Housing / Facility 1 Story House   Steps Into Home 0   Steps In Home 0   Equipment Owned Single Point Cane;Front-Wheel Walker   Lives with - Patient's Self Care Capacity Spouse   Comments spouse works   Prior Level of Functional Mobility   Bed Mobility Independent   Transfer Status Independent   Ambulation Independent   Ambulation Distance limited community   Assistive Devices Used Single Point Cane   Cognition    Cognition / Consciousness X   Passive ROM Lower Body   Passive ROM Lower Body WDL   Active ROM Lower Body    Active ROM Lower Body  X   Comments decreased R   Strength Lower  Body   Lower Body Strength  X   Comments decreased R 4/5   Sensation Lower Body   Lower Extremity Sensation   X   Comments decreased R   Coordination Lower Body    Coordination Lower Body  X   Balance Assessment   Sitting Balance (Static) Good   Sitting Balance (Dynamic) Fair +   Standing Balance (Static) Fair   Standing Balance (Dynamic) Fair -   Weight Shift Sitting Fair   Weight Shift Standing Fair   Bed Mobility    Supine to Sit Modified Independent   Sit to Supine Modified Independent   Scooting Modified Independent   Gait Analysis   Gait Level Of Assist Minimal Assist   Assistive Device Single Point Cane   Distance (Feet) 150   # of Times Distance was Traveled 1   Deviation Decreased Base Of Support   # of Stairs Climbed 0   Weight Bearing Status full   Comments Pt is unstaedy with several LOB   Functional Mobility   Sit to Stand Contact Guard Assist   Bed, Chair, Wheelchair Transfer Contact Guard Assist   Transfer Method Stand Step   How much difficulty does the patient currently have...   Turning over in bed (including adjusting bedclothes, sheets and blankets)? 4   Sitting down on and standing up from a chair with arms (e.g., wheelchair, bedside commode, etc.) 3   Moving from lying on back to sitting on the side of the bed? 4   How much help from another person does the patient currently need...   Moving to and from a bed to a chair (including a wheelchair)? 3   Need to walk in a hospital room? 3   Climbing 3-5 steps with a railing? 2   6 clicks Mobility Score 19   Activity Tolerance   Sitting Edge of Bed 10   Standing 10   Patient / Family Goals    Patient / Family Goal #1 not stated   Short Term Goals    Short Term Goal # 1 I transfers in 6 V   Short Term Goal # 2 I amb x 200 feet in 6 V   Physical Therapy Initial Treatment Plan    Treatment Plan  Gait Training;Neuro Re-Education / Balance;Therapeutic Activities;Therapeutic Exercise   Treatment Frequency 4 Times per Week   Problem List    Problems  Impaired Transfers;Impaired Ambulation;Functional ROM Deficit;Functional Strength Deficit;Impaired Balance;Decreased Activity Tolerance   Anticipated Discharge Equipment and Recommendations   DC Equipment Recommendations Unable to determine at this time   Discharge Recommendations Recommend post-acute placement for additional physical therapy services prior to discharge home   Interdisciplinary Plan of Care Collaboration   IDT Collaboration with  Nursing   Session Information   Date / Session Number  2/3-1 1/4 2/9

## 2024-02-03 NOTE — PROGRESS NOTES
[employer] representative Jackson called back. His soonest availability to convert patient's AICD to MRI mode will be tomorrow morning 2/4 around [id number].    Jackson's direct number: [phone]

## 2024-02-03 NOTE — H&P
"Hospital Medicine History & Physical Note    Date of Service  2/2/2024    Primary Care Physician  Tala Dunne M.D.     Consultants  None      Code Status  Full Code    Chief Complaint  Chief Complaint   Patient presents with    Possible Stroke     Pt. States he drove himself here. Repeatedly stating \"I'm having a major stroke. This has happened before\". Pt. Uncooperative during triage exam, stating he is feeling that he will pass out. Not answering most questions asked. Pt. Does identify R sided weakness, but non-specific about what parts on R side are weak. Pt. Also not giving time frame of symptom start despite repeatedly asking pt. For this. Pt. Also c/o fast HR.     Rapid Heart Beat     History of Presenting Illness  Francesco Schulte is a 73 y.o. male with a past medical history of atrial fibrillation on anticoagulation with apixaban, polycythemia vera, following with oncology, Dr. Sims, prostatic hyperplasia, agent orange exposure, chronic systolic heart failure who presented 2/2/2024 with chest pain and right-sided weakness.  Patient came to the emergency room complaining of chest pain.  The pain is located in the retrosternal area near and is radiated as moderate-severe 8-9/10.  While in the emergency room he developed transient weakness of the right upper and right lower extremity.  Code stroke were called.  Weakness gradually resolved.  The patient also reports having some dizziness, but he denies passing out.     I discussed the plan of care with emergency physician, the patient.    Review of Systems  Review of Systems   Constitutional:  Negative for chills and fever.   Eyes:  Negative for discharge and redness.   Respiratory:  Negative for cough, shortness of breath and stridor.    Cardiovascular:  Positive for chest pain. Negative for leg swelling.   Gastrointestinal:  Negative for abdominal pain and vomiting.   Genitourinary:  Negative for flank pain.   Musculoskeletal:  Negative " for myalgias.   Skin: Negative.    Neurological:  Positive for focal weakness (Worsening right-sided weakness).   Endo/Heme/Allergies:  Does not bruise/bleed easily.   Psychiatric/Behavioral:  The patient is not nervous/anxious.      Past Medical History   has a past medical history of Acute bacterial endocarditis (2021), Agent orange exposure, Anesthesia, Cancer ([facility]), Catheter-associated urinary tract infection ([facility]) (2022), Diabetes ([facility]), Family history of punctured lung (), Heart attack ([facility]) (2017), Hemorrhagic disorder ([facility]), High cholesterol, Ischemic cardiomyopathy, Kidney stones, Lupus ([facility]) (11/15/2019), Orthostatic hypotension (2018), Pain, Polycythemia vera(238.4), Stroke ([facility]) (), Urinary bladder disorder, and Vertigo.    Surgical History   has a past surgical history that includes other cardiac surgery; cath placement; laminotomy; appendectomy; carotid endarterectomy (Right, 3/21/2018); temporal artery biopsy (Right, 2018); pr incis/drain scrotum/testis,epididym (Right, 2020); pr explore scrotum (Right, 2020); pr explore scrotum (Right, 2020); skin abscess incision and drainage (Right, 8/3/2020); split thickness skin graft (N/A, 2020); aicd implant (2021); jacqueline (N/A, 10/30/2021); pr dx bone marrow biopsies (Left, 2023); and bone aspiration biopsy (Left, 2023).     Family History  family history is not on file.      Social History   reports that he has never smoked. He has never used smokeless tobacco. He reports that he does not drink alcohol and does not use drugs.    Allergies  Allergies   Allergen Reactions    Atorvastatin      States he  when he took it and required life saving measures    Doxycycline      Swelling lips and difficulties swallowing    Lisinopril Anaphylaxis     Pt coded    Simvastatin      States he  while taking a statin and required life saving measures    Statins [Hmg-Coa-R Inhibitors]      States he  " while taking a statin and required life saving measures    Beta Adrenergic Blockers Unspecified     dizziness    Eplerenone Unspecified     Intolerance, dehydration, altered mental status    Metformin Palpitations and Unspecified     Cardiac effects  \"Similar to a heart attack, I was hospitalized\"    Spironolactone Palpitations     Medications  Prior to Admission Medications   Prescriptions Last Dose Informant Patient Reported? Taking?   Insulin Degludec (TRESIBA FLEXTOUCH) 100 UNIT/ML Solution Pen-injector 2024 at am  Yes Yes   Sig: Inject 10 Units under the skin every morning.   apixaban (ELIQUIS) 5mg Tab 2024 at am  No No   Sig: Take 1 Tablet by mouth 2 times a day.   dapagliflozin propanediol (FARXIGA) 5 MG Tab 2024 at am  Yes Yes   Sig: Take 5 mg by mouth every day.   dicyclomine (BENTYL) 10 MG Cap unk at unk Patient No No   Sig: TAKE 1 CAPSULE BY MOUTH EVERY 6 HOURS AS NEEDED (ABDOMINAL CRAMPS).   finasteride (PROSCAR) 5 MG Tab 2024 at am  No No   Sig: TAKE 1 TABLET BY MOUTH EVERY DAY   hydroxyurea (HYDREA) 500 MG Cap 2024 at am Patient Yes No   Sig: Take 500 mg by mouth every morning.   tamsulosin (FLOMAX) 0.4 MG capsule 2024 at am  No No   Sig: Take 1 Capsule by mouth every day.      Facility-Administered Medications: None     Physical Exam  Temp:  [36.6 °C (97.8 °F)-36.9 °C (98.4 °F)] 36.6 °C (97.8 °F)  Pulse:  [] 82  Resp:  [18-20] 18  BP: (134-184)/() 134/78  SpO2:  [92 %-100 %] 94 %  Blood Pressure : (!) 150/93   Temperature: 36.9 °C (98.4 °F)   Pulse: 91   Respiration: 18   Pulse Oximetry: 97 %     Physical Exam  Constitutional:       General: He is not in acute distress.     Appearance: He is not ill-appearing or diaphoretic.   HENT:      Head: Atraumatic.      Right Ear: External ear normal.      Left Ear: External ear normal.      Nose: No congestion or rhinorrhea.      Mouth/Throat:      Mouth: Mucous membranes are moist.   Eyes:      General: No scleral " "icterus.        Right eye: No discharge.         Left eye: No discharge.      Pupils: Pupils are equal, round, and reactive to light.   Cardiovascular:      Rate and Rhythm: Normal rate and regular rhythm.   Pulmonary:      Effort: Pulmonary effort is normal.   Abdominal:      General: There is no distension.   Musculoskeletal:      Cervical back: Neck supple. No rigidity. No muscular tenderness.      Right lower leg: No edema.      Left lower leg: No edema.   Skin:     Coloration: Skin is not jaundiced or pale.   Neurological:      Mental Status: He is alert and oriented to person, place, and time.      Motor: Weakness present.      Coordination: Coordination abnormal.      Comments: Power is 4/5 in both right upper and lower extremity  There is no facial droop  Speech is slow, no dysarthria, no word finding difficulty   Psychiatric:         Mood and Affect: Mood normal.         Behavior: Behavior normal.       Laboratory:  Recent Labs     02/02/24  1603   WBC 9.2   RBC 6.67*   HEMOGLOBIN 17.8   HEMATOCRIT 54.3*   MCV 81.4   MCH 26.7*   MCHC 32.8   RDW 67.7*   PLATELETCT 528*   MPV 9.4     Recent Labs     02/02/24  1603   SODIUM 135   POTASSIUM 4.0   CHLORIDE 97   CO2 24   GLUCOSE 148*   BUN 20   CREATININE 1.26   CALCIUM 10.9*     Recent Labs     02/02/24  1603   ALTSGPT 10   ASTSGOT 19   ALKPHOSPHAT 85   TBILIRUBIN 0.6   GLUCOSE 148*     Recent Labs     02/02/24  1603   APTT 40.4*   INR 1.55*     No results for input(s): \"NTPROBNP\" in the last 72 hours.      Recent Labs     02/02/24  1603 02/02/24  1850   TROPONINT 21* 33*     Imaging:  CT-CEREBRAL PERFUSION ANALYSIS   Final Result      1.  Cerebral blood flow less than 30% likely representing completed infarct = 8 mL.      2.  T Max more than 6 seconds likely representing combination of completed infarct and ischemia = 240 mL.      3.  Mismatched volume likely representing ischemic brain/penumbra = 232      4.  Please note that the cerebral perfusion was " performed on the limited brain tissue around the basal ganglia region. Infarct/ischemia outside the CT perfusion sections can be missed in this study.      CT-CTA NECK WITH & W/O-POST PROCESSING   Final Result      1. No evidence of flow-limiting stenosis in the cervical carotid or cervical vertebral arteries.      CT-CTA HEAD WITH & W/O-POST PROCESS   Final Result         1. No hemodynamically significant narrowing of the major intracranial vessels.      2. Mild focal stenosis of the left mid LIS.      DX-CHEST-PORTABLE (1 VIEW)   Final Result         Diffuse groundglass opacity throughout both lungs could relate to hypoventilatory change or mild fluid overload.      CT-HEAD W/O   Final Result         1. No acute intracranial abnormality. No evidence of acute intracranial hemorrhage .      2. An encephalomalacia in the right cerebral lobe, similar to prior.               MR-BRAIN-W/O    (Results Pending)     I personally reviewed patient EKG shows sinus tachycardia with a rate of 99, there is left bundle branch block with expected abnormal repolarization wave.  This is seen on prior EKGs.  QTc is 492.    Assessment/Plan:  Justification for Admission Status  I anticipate this patient will require at least two midnights for appropriate medical management, necessitating inpatient admission because patient has worsening transit right upper and lower extremity weakness concerning for possible stroke versus TIA he will require ongoing monitoring of his weakness with MRI of the brain to further evaluate the possibility of stroke.  He will also need speech, physical and Occupational Therapy evaluation.    Patient will need a Telemetry bed on MEDICAL service.  The patient has transient weakness of the right upper and lower extremities.    * Acute on chronic right upper and lower extremity weakness (HCC)- (present on admission)  Assessment & Plan  Admit to Neuro, with continuous cardiac monitoring  CT, CT-angiography, head,  neck showed no acute infarction, or hemorrhage    I will check MRI brain to rule out acute infarction and other potential causes  Aspiration, Fall, and seizure precautions    Neuro checks   I will start aspirin.  The patient does not tolerate statins.  Permissive hypertension until MRI of the brain is done and rules out a stroke.  Speech, physical, occupational therapy evaluation ordered  I will place referral to physiatry     Chest pain- (present on admission)  Assessment & Plan  EKG shows, sinus tachycardia with a rate of 99, there is left bundle branch block with expected abnormal repolarization wave.  This is seen on prior EKGs.  QTc is 492.   Initial troponin is is elevated at 21, I will trend  Troponin elevation likely secondary to ischemic heart disease and reduced clearance due to chronic kidney disease  I ordered Stat EKG and troponin for recurrence of chest pain.   I will place on continuous cardiac monitoring.  I will start aspirin.  The patient does not tolerate statins  Consider stress test in the morning [not ordered] according to clinical course/troponin trend     ACP (advance care planning)- (present on admission)  Assessment & Plan  I had a discussion with the patient and family present at bedside in the emergency room regarding goals of care, diagnoses, prognosis, and CODE STATUS. We discussed his her prognosis and comorbidities.  The patient has advanced age and multiple chronic medical problems including diabetes, agent orange exposure, atrial fibrillation, history of strokes, advanced chronic systolic heart failure and presenting with acute on chronic transient right-sided weakness concerning for stroke/TIA.  Overall patient has poor functional status.  He also has a poor prognosis.  Despite that patient/family wants to maintain a full code.  They are open to all forms of invasive or noninvasive diagnostic and therapeutic interventions.      History of cerebrovascular accident- (present on  admission)  Assessment & Plan  The patient has a history of multiple cerebrovascular accidents, and residual right-sided weakness  He is presenting with transient right-sided weakness  He has atrial fibrillation with markedly elevated [id number] score  CT, CT-angiography, head, neck showed no acute infarction or hemorrhage  I will check MRI brain to rule out acute infarction and other potential causes  Aspiration, Fall, and seizure precautions    Neuro checks   Permissive hypertension until MRI of the brain is done and rules out a stroke.  Speech, physical, occupational therapy evaluation ordered  I will place referral to physiatry     Chronic systolic congestive heart failure (HCC)- (present on admission)  Assessment & Plan  Not currently in exacerbation.  Monitor volume status closely  I reviewed recent echo done a week ago shows a significantly reduced ejection fraction of 20%.    Polycythemia vera (HCC)- (present on admission)  Assessment & Plan  Following with oncology, Dr. Sims    Essential hypertension- (present on admission)  Assessment & Plan  Not currently on antihypertensive medications.  Avoid as needed antihypertensives until stroke is ruled out    Type 2 diabetes mellitus with hyperglycemia (HCC)- (present on admission)  Assessment & Plan  With no significant hyperglycemia  Last glycated hemoglobin was 6%  I will start short acting insulin for now  I will order Accu-Checks, hypoglycemia protocol  Adjust according to blood sugars trend.     Paroxysmal A-fib (HCC)- (present on admission)  Assessment & Plan  Resume anticoagulation with apixaban  Not currently on blocking agents    Dyslipidemia- (present on admission)  Assessment & Plan  I will start her on a cardiac diet  The patient does not tolerate statins      VTE prophylaxis: SCDs/TEDs and therapeutic anticoagulation with apixaban    I had a discussion with the patient and family present at bedside in the emergency room regarding goals of care,  diagnoses, prognosis, and CODE STATUS. We discussed his her prognosis and comorbidities.  The patient has advanced age and multiple chronic medical problems including diabetes, agent orange exposure, atrial fibrillation, history of strokes, advanced chronic systolic heart failure and presenting with acute on chronic transient right-sided weakness concerning for stroke/TIA.  Overall patient has poor functional status.  He also has a poor prognosis.  Despite that patient/family wants to maintain a full code.  They are open to all forms of invasive or noninvasive diagnostic and therapeutic interventions. I spent 18 minutes on advanced care planning in addition to the time spent managing the other medical problems.

## 2024-02-03 NOTE — CARE PLAN
The patient is Watcher - Medium risk of patient condition declining or worsening    Shift Goals  Clinical Goals: Q4 neuro checks, trend troponin, MRI today  Patient Goals: no more chest pain  Family Goals: no family present    Progress made toward(s) clinical / shift goals:      Patient is not progressing towards the following goals:

## 2024-02-03 NOTE — ASSESSMENT & PLAN NOTE
Presented with HTN emergency  Patient had been on metoprolol before.  He has severe anaphylaxis to lisinopril.  He has reported allergy to spironolactone and beta-blockers cause dizziness.    Starting amlodipine 2.5 mg.

## 2024-02-03 NOTE — ED NOTES
ERP at bedside. Pt on a non-rebreather at this time. Complains of right side pain and left lateral chest pain. Pt following commands.

## 2024-02-03 NOTE — ASSESSMENT & PLAN NOTE
Continue Eliquis  Not currently on blocking agents  Beta-blockers are listed as side effect of dizziness and stopped before.  He was on metoprolol in the past.

## 2024-02-03 NOTE — ASSESSMENT & PLAN NOTE
Causing troponinemia, 21 up to 83    Repeat troponin 53  Starting amlodipine 2.5 mg given new stroke

## 2024-02-03 NOTE — ASSESSMENT & PLAN NOTE
Not currently in exacerbation.  Monitor volume status closely    I reviewed recent echo from 01/25/2024 which showed LVEF 20 to 25%, LV dilated, global hypokinesis, abnormal diastolic function, mildly dilated RV, enlarged RA, severely dilated LA, mild mitral regurgitation, moderate aortic insufficiency, mild to moderate tricuspid regurgitation, RVSP 53 mmHg, noted a small pericardial effusion without hemodynamic compromise, aortic diameter 3.6 cm

## 2024-02-03 NOTE — ED PROVIDER NOTES
"CHIEF COMPLAINT  Chief Complaint   Patient presents with    Possible Stroke     Pt. States he drove himself here. Repeatedly stating \"I'm having a major stroke. This has happened before\". Pt. Uncooperative during triage exam, stating he is feeling that he will pass out. Not answering most questions asked. Pt. Does identify R sided weakness, but non-specific about what parts on R side are weak. Pt. Also not giving time frame of symptom start despite repeatedly asking pt. For this. Pt. Also c/o fast HR.     Rapid Heart Beat       LIMITATION TO HISTORY   Select: Patient appears to be very anxious very difficult to get a complete history out of    HPI    Francesco Schulte is a 73 y.o. male who presents to the Emergency Department acutely for potential stroke and chest pain.  Patient drove himself here but when upon arrival Apparently he was uncooperative during the triage exam he feels like he is going to pass out.  He was immediately brought back to the possibility of acute stroke.  Upon initial evaluation it was thought that the patient's ox saturations were 70% was placed on nonrebreather mask and was called immediately to bedside.  Patient was extremely anxious stating that he started with some severe chest pain he stated that it started just prior to arrival so he drove himself and then he noticed that his right side just became completely numb and he could not move it very well.  The patient states that he had a stroke in the past with some right-sided weakness.  Patient is anxious and difficult to get a history from initially.    OUTSIDE HISTORIAN(S):  Select: There were no historians at bedside wife arrived later    EXTERNAL RECORDS REVIEWED  Select: Other MRI of the brain done 2021 shows right-sided CVA findings from old CVA      PAST MEDICAL HISTORY  Past Medical History:   Diagnosis Date    Acute bacterial endocarditis 11/02/2021    Agent orange exposure     Anesthesia     resistant to pain meds and " "\"numbing medications\"    Cancer (HCC)     polycythemia vera    Catheter-associated urinary tract infection ([facility]) 08/09/2022    This is a new problem, patient has had urinary catheter inserted in emergency room 4 weeks ago due to urinary retention.  He has been experiencing subjective fever, right flank pain.  He tells me he has experienced similar symptoms are when he was diagnosed with urinary tract infection last time.          Diabetes ([facility])     insulin and oral meds    Family history of punctured lung 2002    left lung    Heart attack ([facility]) 03/2017    Followed by [facility]    Hemorrhagic disorder ([facility])     on blood thinners    High cholesterol     Ischemic cardiomyopathy     Kidney stones     hx of kidney stones    Lupus ([facility]) 11/15/2019    Orthostatic hypotension 03/29/2018    Pain     headache pain    Polycythemia vera(238.4)     Stroke ([facility]) 2016    r/t afib; eye issues resolved afterward    Urinary bladder disorder     enlarged prostate, weak stream, difficulty urinating    Vertigo      .    SURGICAL HISTORY  Past Surgical History:   Procedure Laterality Date    AZ DX BONE MARROW BIOPSIES Left 11/8/2023    Procedure: BIOPSY, BONE MARROW, USING NEEDLE OR TROCAR;  Surgeon: Ingrid Zambrano M.D.;  Location: SURGERY SAME DAY [facility];  Service: Orthopedics    BONE ASPIRATION BIOPSY Left 11/8/2023    Procedure: BONE MARROW BIOPSY AND ASPIRATION - DR. ANDERSON;  Surgeon: Ingrid Zambrano M.D.;  Location: SURGERY SAME DAY [facility];  Service: Orthopedics    PAMELA N/A 10/30/2021    Procedure: ECHOCARDIOGRAM, TRANSESOPHAGEAL;  Surgeon: Beau Gutierrez M.D.;  Location: SURGERY [facility];  Service: Cardiac    AICD IMPLANT  07/29/2021    [employer] [id number] implanted by Dr. Isaac.    SPLIT THICKNESS SKIN GRAFT N/A 8/23/2020    Procedure: APPLICATION, GRAFT, SKIN, SPLIT-THICKNESS;  Surgeon: Elisa Craig M.D.;  Location: SURGERY [facility];  Service: Plastics    SKIN ABSCESS INCISION AND DRAINAGE Right 8/3/2020 "    Procedure: INCISION AND DRAINAGE - SCROTAL WOUND - WOUND VAC PLACEMENT;  Surgeon: Jaylen Hayes M.D.;  Location: SURGERY [facility];  Service: Urology    DC EXPLORE SCROTUM Right 7/31/2020    Procedure: EXPLORATION, SCROTUM - FOR WASH OUT OF SCROTAL WOUND & WOUND VAC PLACEMENT;  Surgeon: Ferny Rosales M.D.;  Location: SURGERY [facility];  Service: Urology    DC EXPLORE SCROTUM Right 7/29/2020    Procedure: EXPLORATION, SCROTUM - FOR I & D OF SCROTAL AND  GROIN WOUND;  Surgeon: Donta Viera M.D.;  Location: SURGERY [facility];  Service: Urology    DC INCIS/DRAIN SCROTUM/TESTIS,EPIDIDYM Right 7/25/2020    Procedure: INCISION AND DRAINAGE, SCROTUM;  Surgeon: Nick Negro M.D.;  Location: [facility];  Service: Urology    TEMPORAL ARTERY BIOPSY Right 6/26/2018    Procedure: TEMPORAL ARTERY BIOPSY;  Surgeon: Rajendra Aguilar M.D.;  Location: SURGERY SAME DAY [facility];  Service: General    CAROTID ENDARTERECTOMY Right 3/21/2018    Procedure: CAROTID ENDARTERECTOMY- W/EEG MONITORING;  Surgeon: Benny Morfin M.D.;  Location: SURGERY [facility];  Service: General    APPENDECTOMY      CATH PLACEMENT      right arm    LAMINOTOMY      diskectomy; C4    OTHER CARDIAC SURGERY      stents x 3         FAMILY HISTORY  Family History   Problem Relation Age of Onset    Ovarian Cancer Neg Hx     Diabetes Neg Hx           SOCIAL HISTORY  Social History     Socioeconomic History    Marital status:      Spouse name: Not on file    Number of children: Not on file    Years of education: Not on file    Highest education level: Not on file   Occupational History    Not on file   Tobacco Use    Smoking status: Never    Smokeless tobacco: Never   Vaping Use    Vaping Use: Never used   Substance and Sexual Activity    Alcohol use: No    Drug use: No    Sexual activity: Not on file   Other Topics Concern    Not on file   Social History Narrative    Not on file     Social  Determinants of Health     Financial Resource Strain: Low Risk  (2022)    Overall Financial Resource Strain (CARDIA)     Difficulty of Paying Living Expenses: Not hard at all   Food Insecurity: No Food Insecurity (2022)    Hunger Vital Sign     Worried About Running Out of Food in the Last Year: Never true     Ran Out of Food in the Last Year: Never true   Transportation Needs: No Transportation Needs (2022)    PRAPARE - Transportation     Lack of Transportation (Medical): No     Lack of Transportation (Non-Medical): No   Physical Activity: Not on file   Stress: Not on file   Social Connections: Not on file   Intimate Partner Violence: Not on file   Housing Stability: Not on file         CURRENT MEDICATIONS  No current facility-administered medications on file prior to encounter.     Current Outpatient Medications on File Prior to Encounter   Medication Sig Dispense Refill    dapagliflozin propanediol (FARXIGA) 5 MG Tab Take 5 mg by mouth every day.      Insulin Degludec (TRESIBA FLEXTOUCH) 100 UNIT/ML Solution Pen-injector Inject 10 Units under the skin every morning.      apixaban (ELIQUIS) 5mg Tab Take 1 Tablet by mouth 2 times a day. 180 Tablet 3    finasteride (PROSCAR) 5 MG Tab TAKE 1 TABLET BY MOUTH EVERY DAY 90 Tablet 1    tamsulosin (FLOMAX) 0.4 MG capsule Take 1 Capsule by mouth every day. 90 Capsule 1    dicyclomine (BENTYL) 10 MG Cap TAKE 1 CAPSULE BY MOUTH EVERY 6 HOURS AS NEEDED (ABDOMINAL CRAMPS). 360 Capsule 3    hydroxyurea (HYDREA) 500 MG Cap Take 500 mg by mouth every morning.             ALLERGIES  Allergies   Allergen Reactions    Atorvastatin      States he  when he took it and required life saving measures    Doxycycline      Swelling lips and difficulties swallowing    Lisinopril Anaphylaxis     Pt coded    Simvastatin      States he  while taking a statin and required life saving measures    Statins [Hmg-Coa-R Inhibitors]      States he  while taking a statin and  "required life saving measures    Beta Adrenergic Blockers Unspecified     dizziness    Eplerenone Unspecified     Intolerance, dehydration, altered mental status    Metformin Palpitations and Unspecified     Cardiac effects  \"Similar to a heart attack, I was hospitalized\"    Spironolactone Palpitations       PHYSICAL EXAM  VITAL SIGNS:BP (!) 150/93   Pulse 91   Temp 36.9 °C (98.4 °F) (Temporal)   Resp 18   Ht 1.88 m (6' 2\")   Wt 85 kg (187 lb 6.3 oz)   SpO2 97%   BMI 24.06 kg/m²     Constitutional: Anxious in appearance  HENT: Normocephalic, Atraumatic, Bilateral external ears normal.  Eyes:  conjunctiva are normal.   Neck: Supple.  Nontender midline  Cardiovascular: Regular rate and rhythm without murmurs gallops or rubs.   Thorax & Lungs: No respiratory distress. Breathing comfortably. Lungs are clear to auscultation bilaterally, there are no wheezes no rales. Chest wall is nontender.  Abdomen: Soft, non distended, non tender   Skin: Warm, Dry, No erythema,   Back: No tenderness, No CVA tenderness.  Musculoskeletal: No clubbing cyanosis or edema good range of motion   Neurologic: Alert & oriented x 3, and is very anxious.  He has 3/5 strength in right upper right lower extremity.  There is no drift.  The patient is decree sensation of the right upper and right lower extremity.  Left upper and left upper and lower extremity were normal.  Nerves II through XII appear to be grossly intact.  NIH was 4 initially  Psychiatric: Affect normal, Judgment normal, Mood normal.       DIAGNOSTIC STUDIES / PROCEDURES      LABS  Results for orders placed or performed during the hospital encounter of 02/02/24   CBC with Differential   Result Value Ref Range    WBC 9.2 4.8 - 10.8 K/uL    RBC 6.67 (H) 4.70 - 6.10 M/uL    Hemoglobin 17.8 14.0 - 18.0 g/dL    Hematocrit 54.3 (H) 42.0 - 52.0 %    MCV 81.4 81.4 - 97.8 fL    MCH 26.7 (L) 27.0 - 33.0 pg    MCHC 32.8 32.3 - 36.5 g/dL    RDW 67.7 (H) 35.9 - 50.0 fL    Platelet Count " 528 (H) 164 - 446 K/uL    MPV 9.4 9.0 - 12.9 fL    Neutrophils-Polys 81.90 (H) 44.00 - 72.00 %    Lymphocytes 9.20 (L) 22.00 - 41.00 %    Monocytes 5.30 0.00 - 13.40 %    Eosinophils 1.00 0.00 - 6.90 %    Basophils 1.50 0.00 - 1.80 %    Immature Granulocytes 1.10 (H) 0.00 - 0.90 %    Nucleated RBC 0.00 0.00 - 0.20 /100 WBC    Neutrophils (Absolute) 7.51 (H) 1.82 - 7.42 K/uL    Lymphs (Absolute) 0.84 (L) 1.00 - 4.80 K/uL    Monos (Absolute) 0.49 0.00 - 0.85 K/uL    Eos (Absolute) 0.09 0.00 - 0.51 K/uL    Baso (Absolute) 0.14 (H) 0.00 - 0.12 K/uL    Immature Granulocytes (abs) 0.10 0.00 - 0.11 K/uL    NRBC (Absolute) 0.00 K/uL    Anisocytosis 1+     Macrocytosis 1+    Complete Metabolic Panel (CMP)   Result Value Ref Range    Sodium 135 135 - 145 mmol/L    Potassium 4.0 3.6 - 5.5 mmol/L    Chloride 97 96 - 112 mmol/L    Co2 24 20 - 33 mmol/L    Anion Gap 14.0 7.0 - 16.0    Glucose 148 (H) 65 - 99 mg/dL    Bun 20 8 - 22 mg/dL    Creatinine 1.26 0.50 - 1.40 mg/dL    Calcium 10.9 (H) 8.4 - 10.2 mg/dL    Correct Calcium 10.9 (H) 8.5 - 10.5 mg/dL    AST(SGOT) 19 12 - 45 U/L    ALT(SGPT) 10 2 - 50 U/L    Alkaline Phosphatase 85 30 - 99 U/L    Total Bilirubin 0.6 0.1 - 1.5 mg/dL    Albumin 4.0 3.2 - 4.9 g/dL    Total Protein 9.2 (H) 6.0 - 8.2 g/dL    Globulin 5.2 (H) 1.9 - 3.5 g/dL    A-G Ratio 0.8 g/dL   Troponins NOW   Result Value Ref Range    Troponin T 21 (H) 6 - 19 ng/L   Troponins in two (2) hours   Result Value Ref Range    Troponin T 33 (H) 6 - 19 ng/L   PROTHROMBIN TIME   Result Value Ref Range    PT 19.3 (H) 12.0 - 14.6 sec    INR 1.55 (H) 0.87 - 1.13   APTT   Result Value Ref Range    APTT 40.4 (H) 24.7 - 36.0 sec   COD (ADULT)   Result Value Ref Range    ABO Grouping Only O     Rh Grouping Only POS     Antibody Screen-Cod NEG    ESTIMATED GFR   Result Value Ref Range    GFR (CKD-EPI) 61 >60 mL/min/1.73 m 2   PLATELET ESTIMATE   Result Value Ref Range    Plt Estimation Increased    MORPHOLOGY   Result Value Ref  Range    RBC Morphology Present     Large Platelets 1+     Polychromia 1+     Poikilocytosis 1+     Ovalocytes 1+    EKG   Result Value Ref Range    Report       [facility] Emergency Dept.    Test Date:  2024  Pt Name:    MARZENA ROMEO             Department: [facility]  MRN:        [id number]                      [facility]  Gender:     Male                         Technician: [id number]  :        1950                   Requested By:ER TRIAGE PROTOCOL  Order #:    [id number]                    Reading MD: ANAYELI ARECHIGA MD    Measurements  Intervals                                Axis  Rate:       99                           P:          52  ND:         187                          QRS:        -48  QRSD:       126                          T:          118  QT:         383  QTc:        492    Interpretive Statements  Sinus rhythm  Left bundle branch block  Baseline wander in lead(s) II,III,aVF  Compared to ECG 2024 07:42:31  Left bundle-branch block now present  Ventricular premature complex(es) no longer present  Myocardial infarct finding no longer present  Early repolarization no longer present   Possible ischemia no longer present  ST (T wave) deviation no longer present  Electronically Signed On 2024 18:05:39 PST by ANAYELI ARECHIGA MD     POCT glucose device results   Result Value Ref Range    POC Glucose, Blood 141 (H) 65 - 99 mg/dL       EKG:   I have independently interpreted this EKG as detailed above.       RADIOLOGY  I have independently interpreted the diagnostic imaging associated with this visit and am waiting the final reading from the radiologist.   My preliminary interpretation is as follows: Chest x-ray shows diffuse groundglass opacities through both lungs.      Radiologist interpretation:   CT-CEREBRAL PERFUSION ANALYSIS   Final Result      1.  Cerebral blood flow less than 30% likely representing completed infarct = 8 mL.      2.  T Max  more than 6 seconds likely representing combination of completed infarct and ischemia = 240 mL.      3.  Mismatched volume likely representing ischemic brain/penumbra = 232      4.  Please note that the cerebral perfusion was performed on the limited brain tissue around the basal ganglia region. Infarct/ischemia outside the CT perfusion sections can be missed in this study.      CT-CTA NECK WITH & W/O-POST PROCESSING   Final Result      1. No evidence of flow-limiting stenosis in the cervical carotid or cervical vertebral arteries.      CT-CTA HEAD WITH & W/O-POST PROCESS   Final Result         1. No hemodynamically significant narrowing of the major intracranial vessels.      2. Mild focal stenosis of the left mid LIS.      DX-CHEST-PORTABLE (1 VIEW)   Final Result         Diffuse groundglass opacity throughout both lungs could relate to hypoventilatory change or mild fluid overload.      CT-HEAD W/O   Final Result         1. No acute intracranial abnormality. No evidence of acute intracranial hemorrhage .      2. An encephalomalacia in the right cerebral lobe, similar to prior.               MR-BRAIN-W/O    (Results Pending)        COURSE & MEDICAL DECISION MAKING    ED COURSE:    ED Observation Status? No, the patient does not qualify for observation    INTERVENTIONS BY ME:  Medications   acetaminophen (Tylenol) tablet 650 mg (has no administration in time range)   senna-docusate (Pericolace Or Senokot S) 8.6-50 MG per tablet 2 Tablet (has no administration in time range)     And   polyethylene glycol/lytes (Miralax) Packet 1 Packet (has no administration in time range)   insulin regular (HumuLIN R,NovoLIN R) injection (has no administration in time range)     And   dextrose 50% (D50W) injection 25 g (has no administration in time range)   apixaban (Eliquis) tablet 5 mg (has no administration in time range)   dicyclomine (Bentyl) tablet 10 mg (has no administration in time range)   finasteride (Proscar) tablet 5  mg (has no administration in time range)   Insulin Degludec SOPN 10 Units (has no administration in time range)   tamsulosin (Flomax) capsule 0.4 mg (has no administration in time range)   morphine 4 MG/ML injection 4 mg (4 mg Intravenous Given 2/2/24 1642)   ondansetron (Zofran) syringe/vial injection 4 mg (4 mg Intravenous Given 2/2/24 1643)   iohexol (OMNIPAQUE) 350 mg/mL (IV) (80 mL Intravenous Given 2/2/24 1635)   iohexol (OMNIPAQUE) 350 mg/mL (IV) (40 mL Intravenous Given 2/2/24 1649)         Rechecks reevaluation after pain medications his blood pressure did come down to 140s.  He is actually moving his right arm and right leg improved.      INITIAL ASSESSMENT, COURSE AND PLAN  Care Narrative: Patient presents to the emergency department initially with ox saturations noted to be 75% and with right-sided deficits.  I initiated a stroke protocol.  The patient was then placed on nonrebreather mask and repeated vital signs and oxygen saturations were 98 to 99% on nonrebreather mask.  Patient was immediately taken back for CT scan of the head because of his complaint of chest pain as well as headache he currently is anticoagulated NIH of 4.  CT scan shows no signs of significant or cerebral bleed however at this point the patient is not an alteplase candidate because he is currently on anticoagulation.  From the standpoint of the chest pain initial EKG was done which is unchanged from prior there is no signs of acute ST elevations.  Initial troponin is slightly elevated but I did give him some morphine to help him with his chest pain and his pain and symptoms have actually improved.  Upon reevaluation he no longer had any chest pain EKG is unchanged troponin is slightly elevated.  Based on the above symptomatology is very possible patient may have had a TIA but in conjunction of his significant cardiac history and his chest discomfort and slightly elevated troponin I do feel the patient should be admitted to the  [facility] for further restratification for acute coronary syndrome.  Have spoken to the hospitalist for admission.               ADDITIONAL PROBLEM LIST  1.  History of AICD.  DISPOSITION AND DISCUSSIONS  Patient will be admitted in guarded condition    FINAL DIAGNOSIS  1. Chest pain, unspecified type    2. Right sided weakness    3. Flaccid hemiplegia, unspecified etiology, unspecified laterality (HCC)        Electronically signed by: Aaron Chacon M.D.,8:01 PM 02/02/24

## 2024-02-03 NOTE — HOSPITAL COURSE
"73-year-old male with a cardiac history of atrial fibrillation on Eliquis, Ischemic cardiomyopathy, combined HFrEF 15-25%, prior AICD and lead extraction due to MSSA bacteremia in 11/02/2021, s new [employer] AICD placement on 05/08/2023, and \"complex CAD S/P STEMI with multiple PCI (LAD, Lcs and POBA to the digonal).\" He reported his prior device needed to be removed as it \"burned my heart.\"    Additionally, his PMHx includes a prior CVA to Right occipital, partial and frontal, BPH, recurrent ESBL E. coli, T12-L1 postherpetic neuralgia, splenomegaly from polycythemia vera following with Dr. Sims.    He was admitted on 2/2/24 with reports severe chest pain and of possible stroke symptoms. Patient had right sided weakness at 5PM in the ER, and felt faint in the ER. Stroke code was called. At the same time, patient had ongoing chest pain complaints.    CT cerebral perfusion performed in showed positive results including mismatch.    I reviewed prior stress test in 05/2021 showed severe LV dysfunction with an EF of 19%, large prior apical infarct but had no reversible ischemia at that time.  This was also seen in 01/2019.    He had a cardiac defibrillator placed on 07/2021.  He had lead extraction on 11/2021.  A new defibrillator was placed on 05/2023.    01/25/2024 echocardiogram showed LVEF 20 to 25%, LV dilated, global hypokinesis, abnormal diastolic function, mildly dilated RV, enlarged RA, severely dilated LA, mild mitral regurgitation, moderate aortic insufficiency, mild to moderate tricuspid regurgitation, RVSP 53 mmHg, noted a small pericardial effusion without hemodynamic compromise, aortic diameter 3.6 cm    Prior cardiac MRI on 3/23/2022 showed extensive transmural infarct throughout the anterior and septal wall from midportion to the apex.  An additional transmural infarct in the mid to basal lateral wall.  Questionable aortic dissection.  Trace pericardial effusion.  CTA chest on 4/4/2023 did not " show dissection, no PE, showed severe coronary calcifications.    CT thorax without contrast on 1/27/2024 showed enlarged heart with moderate pericardial effusion, trace pleural fluid, extensive triple-vessel coronary artery calcification.  No masses or pneumothorax.

## 2024-02-03 NOTE — DISCHARGE PLANNING
PM&R referral from Dr. Pedraza. Stroke protocol pending work up. Physiatry consult pended. Medicare? [facility] provider per chart review.

## 2024-02-03 NOTE — ASSESSMENT & PLAN NOTE
The patient has a history of multiple cerebrovascular accidents, and residual right-sided weakness  He is presenting with transient right-sided weakness  He has atrial fibrillation with markedly elevated [id number] score  CT, CT-angiography, head, neck showed no acute infarction or hemorrhage    - I reviewed prior brain MRI, showed CVA more to the right occipital, but also right parietal and right frontal lobes.  - I reviewed CT perfusion, showing positive results. I changed brain MRI to STAT.  There has been a delay in MRI as the [employer] will need to turn off the AICD during the MRI.   -- > Later, I was able to confirm with Dr. Ross the results are showing false positive/artifact findings.

## 2024-02-03 NOTE — CARE PLAN
The patient is Watcher - Medium risk of patient condition declining or worsening    Shift Goals  Clinical Goals: neuro checks, trend trops, MRI kristopher  Patient Goals: get better  Family Goals: KAL    Progress made toward(s) clinical / shift goals:  Pt reports no pain or chest pain at this time, q4h neuro checks, trend trops, MRI kristopher, NIH completed, bed alarm on, call light within reach    Patient is not progressing towards the following goals:      Problem: Optimal Care of the Stroke Patient  Goal: Optimal emergency care for the stroke patient  Outcome: Progressing     Problem: Psychosocial - Patient Condition  Goal: Patient's ability to verbalize feelings about condition will improve  Outcome: Progressing     Problem: Neuro Status  Goal: Neuro status will remain stable or improve  Outcome: Progressing     Problem: Hemodynamic Monitoring  Goal: Patient's hemodynamics, fluid balance and neurologic status will be stable or improve  Outcome: Progressing     Problem: Fall Risk  Goal: Patient will remain free from falls  Outcome: Progressing

## 2024-02-03 NOTE — ASSESSMENT & PLAN NOTE
01/25/2024 echocardiogram RVSP 53 mmHg  - not on diuretics at home.    Severity  Severe 46-55    WHO difficult to discern which group  Group 2-due to left heart disease  Group 5-unknown causes perhaps polycythemia vera

## 2024-02-03 NOTE — ED NOTES
"Pt brought back from triage and placed in 10H. 3 Rn's at bedside. Pt answering questions appropriately but having to be asked multiple times. Pt unable to hold his self up while removing t-shirt. Cardiac monitor placed and pt O2 showing 72% RA. Pt placed on non-rebreather and ERP called to bedside. Pt stating he is having severe chest pain and that he \"can't move his right side.\" Code stroke called and patient taken to CT immediately. Pt tolerated well and brought back to room 10A. Placed on monitor. RA sat trailed and patient O2 saturation at 95% RA. Answering questions appropriately and no longer complaining of CP.   "

## 2024-02-03 NOTE — PROGRESS NOTES
4 Eyes Skin Assessment Completed by JOSLYN Dunn and JOSLYN Castellanos.    Head WDL  Ears Redness and Blanching  Nose Redness and Blanching  Mouth WDL  Neck WDL  Breast/Chest scar on left chest  Shoulder Blades WDL  Spine WDL  (R) Arm/Elbow/Hand WDL  (L) Arm/Elbow/Hand WDL  Abdomen Scarring  Groin WDL  Scrotum/Coccyx/Buttocks Redness      (R) Leg Bruising  (L) Leg Bruising  (R) Heel/Foot/Toe Redness and Non-Blanching, calluses, toe overgrowths (see LDAs)      (L) Heel/Foot/Toe calluses, dryness (see LDAs)          Devices In Places Tele Box      Interventions In Place Sacral Mepilex and Pillows    Possible Skin Injury No    Pictures Uploaded Into Epic Yes  Wound Consult Placed Yes  RN Wound Prevention Protocol Ordered Yes

## 2024-02-03 NOTE — ASSESSMENT & PLAN NOTE
EKG shows, sinus tachycardia with a rate of 99, there is left bundle branch block with expected abnormal repolarization wave.  This is seen on prior EKGs.  QTc is 492.   Initial troponin is is elevated at 21, I will trend    Troponin elevated up to 83 and returned to 82. Showing acute demand ischemia from HTN emergency.  - pt reported chest pain, I have thoroughly evaluated ECGs obtained and discussed with on-call Cardiologist Dr. Silver. We confirmed patient has an LBBB but no STEMI. Patient's troponinemia is from hypertensive emergency, as patient presented with high , .    2/4:  Patient reported left sided minor chest pain this morning.  I ordered troponin which was 53, less than yesterday.  I gave patient IV famotidine and Zofran with benefit.

## 2024-02-03 NOTE — PROGRESS NOTES
"Hospital Medicine Daily Progress Note    Date of Service  2/3/2024    Chief Complaint  Francesco Schulte is a 73 y.o. male admitted 2/2/2024 with   Chief Complaint   Patient presents with    Possible Stroke     Pt. States he drove himself here. Repeatedly stating \"I'm having a major stroke. This has happened before\". Pt. Uncooperative during triage exam, stating he is feeling that he will pass out. Not answering most questions asked. Pt. Does identify R sided weakness, but non-specific about what parts on R side are weak. Pt. Also not giving time frame of symptom start despite repeatedly asking pt. For this. Pt. Also c/o fast HR.     Rapid Heart Beat     Hospital Course  73-year-old male with a cardiac history of atrial fibrillation on Eliquis, Ischemic cardiomyopathy, combined HFrEF 15-25%, prior AICD and lead extraction due to MSSA bacteremia in 11/02/2021, s new [employer] AICD placement on 05/08/2023, and \"complex CAD S/P STEMI with multiple PCI (LAD, Lcs and POBA to the digonal).\" He reported his prior device needed to be removed as it \"burned my heart.\"    Additionally, his PMHx includes a prior CVA to Right occipital, partial and frontal, BPH, recurrent ESBL E. coli, T12-L1 postherpetic neuralgia, splenomegaly from polycythemia vera following with Dr. Sims.    He was admitted on 2/2/24 with reports severe chest pain and of possible stroke symptoms. Patient had right sided weakness at 5PM in the ER, and felt faint in the ER. Stroke code was called. At the same time, patient had ongoing chest pain complaints.    CT cerebral perfusion performed in showed positive results including mismatch.    I reviewed prior stress test in 05/2021 showed severe LV dysfunction with an EF of 19%, large prior apical infarct but had no reversible ischemia at that time.  This was also seen in 01/2019.    He had a cardiac defibrillator placed on 07/2021.  He had lead extraction on 11/2021.  A new defibrillator was " placed on 05/2023.    01/25/2024 echocardiogram showed LVEF 20 to 25%, LV dilated, global hypokinesis, abnormal diastolic function, mildly dilated RV, enlarged RA, severely dilated LA, mild mitral regurgitation, moderate aortic insufficiency, mild to moderate tricuspid regurgitation, RVSP 53 mmHg, noted a small pericardial effusion without hemodynamic compromise, aortic diameter 3.6 cm    Prior cardiac MRI on 3/23/2022 showed extensive transmural infarct throughout the anterior and septal wall from midportion to the apex.  An additional transmural infarct in the mid to basal lateral wall.  Questionable aortic dissection.  Trace pericardial effusion.  CTA chest on 4/4/2023 did not show dissection, no PE, showed severe coronary calcifications.    CT thorax without contrast on 1/27/2024 showed enlarged heart with moderate pericardial effusion, trace pleural fluid, extensive triple-vessel coronary artery calcification.  No masses or pneumothorax.    Interval Problem Update  Pt reported early morning chest pain 3/10 to bedside RN.    - I called radiology, unfortunately, I am unable to confirm the CT perfusion study was notified to a physician or provider. I changed MRI to stat, approved scan with current AICD as pt had a MRI cardiac 3/23/22.  - pt reported chest pain, I have thoroughly evaluated ECGs obtained and discussed with on-call Cardiologist Dr. Silver. We confirmed patient has an LBBB but no STEMI. Patient's troponinemia is from hypertensive emergency, as patient presented with high , .    I have discussed this patient's plan of care and discharge plan at IDT rounds today with Case Management, Nursing, Nursing leadership, and other members of the IDT team.    Consultants/Specialty  cardiology    Code Status  Full Code    Disposition  The patient is not medically cleared for discharge to home or a post-acute facility.      I have placed the appropriate orders for post-discharge needs.    Review of  Systems  Review of Systems   Constitutional:  Negative for chills, fever and malaise/fatigue.   Respiratory:  Positive for shortness of breath. Negative for cough.    Cardiovascular:  Positive for chest pain. Negative for palpitations.   Gastrointestinal:  Negative for abdominal pain, constipation, diarrhea, nausea and vomiting.   Musculoskeletal:  Negative for back pain and joint pain.   Neurological:  Negative for dizziness and headaches.   All other systems reviewed and are negative.       Physical Exam  Temp:  [36.2 °C (97.2 °F)-36.9 °C (98.4 °F)] 36.2 °C (97.2 °F)  Pulse:  [] 64  Resp:  [16-20] 16  BP: (120-184)/() 120/65  SpO2:  [90 %-100 %] 90 %    Physical Exam  Vitals and nursing note reviewed.   Constitutional:       General: He is not in acute distress.     Appearance: He is not diaphoretic.      Comments: Frail, thin appearing elderly male   HENT:      Head: Normocephalic and atraumatic.      Comments: Temporal muscle wasting noted     Nose: Nose normal. No congestion.      Mouth/Throat:      Mouth: Mucous membranes are dry.      Pharynx: No oropharyngeal exudate.   Eyes:      General: No scleral icterus.     Extraocular Movements: Extraocular movements intact.   Cardiovascular:      Rate and Rhythm: Normal rate and regular rhythm.      Pulses: Normal pulses.      Heart sounds: Normal heart sounds. No murmur heard.  Pulmonary:      Effort: Pulmonary effort is normal. No respiratory distress.      Breath sounds: Normal breath sounds. No wheezing or rales.   Abdominal:      General: Abdomen is flat. Bowel sounds are normal. There is no distension.      Palpations: Abdomen is soft.      Tenderness: There is no abdominal tenderness.   Musculoskeletal:         General: No swelling or tenderness. Normal range of motion.      Cervical back: Normal range of motion and neck supple.      Comments: Sarcopenic. B/L dorsal hands visible muscle atrophy.   Skin:     General: Skin is warm.      Capillary  Refill: Capillary refill takes less than 2 seconds.      Coloration: Skin is not jaundiced or pale.   Neurological:      General: No focal deficit present.      Mental Status: He is alert and oriented to person, place, and time. Mental status is at baseline.      Motor: Weakness present.   Psychiatric:         Mood and Affect: Mood normal.         Behavior: Behavior normal.         Thought Content: Thought content normal.         Judgment: Judgment normal.         Fluids    Intake/Output Summary (Last 24 hours) at 2/3/2024 1116  Last data filed at 2/3/2024 0218  Gross per 24 hour   Intake 824 ml   Output --   Net 824 ml       Laboratory  Recent Labs     02/02/24  1603 02/03/24  0124   WBC 9.2 7.8   RBC 6.67* 6.15*   HEMOGLOBIN 17.8 16.2   HEMATOCRIT 54.3* 50.2   MCV 81.4 81.6   MCH 26.7* 26.3*   MCHC 32.8 32.3   RDW 67.7* 68.6*   PLATELETCT 528* 419   MPV 9.4 9.2     Recent Labs     02/02/24  1603 02/03/24  0124   SODIUM 135 134*   POTASSIUM 4.0 3.9   CHLORIDE 97 101   CO2 24 22   GLUCOSE 148* 149*   BUN 20 22   CREATININE 1.26 1.25   CALCIUM 10.9* 9.8     Recent Labs     02/02/24  1603   APTT 40.4*   INR 1.55*               Imaging  CT-CEREBRAL PERFUSION ANALYSIS   Final Result   Addendum (preliminary) 1 of 1   1.  Cerebral blood flow less than 30% = 8 mL. The decrease blood flow is    scattered and could be artifact.      2.  T Max more than 6 seconds  = 240 mL. There is scattered decreased TMAX    throughout the bilateral cerebral hemisphere, right is slightly more than    left. This could be artifact and sequela of prior infarct.      Final      1.  Cerebral blood flow less than 30% likely representing completed infarct = 8 mL.      2.  T Max more than 6 seconds likely representing combination of completed infarct and ischemia = 240 mL.      3.  Mismatched volume likely representing ischemic brain/penumbra = 232      4.  Please note that the cerebral perfusion was performed on the limited brain tissue around the  basal ganglia region. Infarct/ischemia outside the CT perfusion sections can be missed in this study.      CT-CTA NECK WITH & W/O-POST PROCESSING   Final Result      1. No evidence of flow-limiting stenosis in the cervical carotid or cervical vertebral arteries.      CT-CTA HEAD WITH & W/O-POST PROCESS   Final Result   Addendum (preliminary) 1 of 1   There is narrowing of the right P2 segment which is improved since    10/24/2021.      Final         1. No hemodynamically significant narrowing of the major intracranial vessels.      2. Mild focal stenosis of the left mid LIS.      DX-CHEST-PORTABLE (1 VIEW)   Final Result         Diffuse groundglass opacity throughout both lungs could relate to hypoventilatory change or mild fluid overload.      CT-HEAD W/O   Final Result         1. No acute intracranial abnormality. No evidence of acute intracranial hemorrhage .      2. An encephalomalacia in the right cerebral lobe, similar to prior.               MR-BRAIN-W/O    (Results Pending)        Assessment/Plan  * Acute on chronic right upper and lower extremity weakness (HCC)- (present on admission)  Assessment & Plan  Admit to Neuro, with continuous cardiac monitoring  CT, CT-angiography, head, neck showed no acute infarction, or hemorrhage    Aspiration, Fall, and seizure precautions      Ruling out stroke/TIA. Symptoms have improved, suspecting TIA    Hypertensive emergency- (present on admission)  Assessment & Plan  Causing troponinemia, 21 up to 83  Repeating troponin  BP improved  I discussed with Cardiologist on-call Dr. Silver    History of cerebrovascular accident- (present on admission)  Assessment & Plan  The patient has a history of multiple cerebrovascular accidents, and residual right-sided weakness  He is presenting with transient right-sided weakness  He has atrial fibrillation with markedly elevated [id number] score  CT, CT-angiography, head, neck showed no acute infarction or hemorrhage    Aspiration,  Fall, and seizure precautions    Neuro checks   Speech, physical, occupational therapy evaluation ordered  Referral to physiatry     - I reviewed prior brain MRI, showed CVA more to the right occipital, but also right parietal and right frontal lobes.  - I reviewed CT perfusion, showing positive results. I changed brain MRI to STAT.  There has been a delay in MRI as the [employer] will need to turn off the AICD during the MRI.       Chest pain- (present on admission)  Assessment & Plan  EKG shows, sinus tachycardia with a rate of 99, there is left bundle branch block with expected abnormal repolarization wave.  This is seen on prior EKGs.  QTc is 492.   Initial troponin is is elevated at 21, I will trend    Troponin elevated up to 83 and returned to 82. Showing acute demand ischemia from HTN emergency.  - pt reported chest pain, I have thoroughly evaluated ECGs obtained and discussed with on-call Cardiologist Dr. Silver. We confirmed patient has an LBBB but no STEMI. Patient's troponinemia is from hypertensive emergency, as patient presented with high , .    Pulmonary hypertension (HCC)- (present on admission)  Assessment & Plan  01/25/2024 echocardiogram RVSP 53 mmHg  - not on diuretics at home.    Severity  Severe 46-55    WHO difficult to discern which group  Group 2-due to left heart disease  Group 5-unknown causes perhaps polycythemia vera    ACP (advance care planning)- (present on admission)  Assessment & Plan  Full code    Chronic systolic congestive heart failure (HCC)- (present on admission)  Assessment & Plan  Not currently in exacerbation.  Monitor volume status closely    I reviewed recent echo from 01/25/2024 which showed LVEF 20 to 25%, LV dilated, global hypokinesis, abnormal diastolic function, mildly dilated RV, enlarged RA, severely dilated LA, mild mitral regurgitation, moderate aortic insufficiency, mild to moderate tricuspid regurgitation, RVSP 53 mmHg, noted a small  pericardial effusion without hemodynamic compromise, aortic diameter 3.6 cm    Polycythemia vera (HCC)- (present on admission)  Assessment & Plan  Following with oncology, Dr. Sims  On Hydroxyurea    Essential hypertension- (present on admission)  Assessment & Plan  Not currently on antihypertensive medications.  Presented with HTN emergency    Type 2 diabetes mellitus with hyperglycemia (HCC)- (present on admission)  Assessment & Plan  With no significant hyperglycemia  Last glycated hemoglobin was 6%  Accuchecks, ISS and hypoglycemia protocol  Hold home Farxiga and Tresiba    Paroxysmal A-fib (HCC)- (present on admission)  Assessment & Plan  Continue Eliquis  Not currently on blocking agents    Dyslipidemia- (present on admission)  Assessment & Plan  Cardiac diet  Intolerance to Statins         VTE prophylaxis:    therapeutic anticoagulation with eliquis 5 mg BID      I have performed a physical exam and reviewed and updated ROS and Plan today (2/3/2024). In review of yesterday's note (2/2/2024), there are no changes except as documented above.      Patient is critically ill.   I have personally seen and evaluated patient in room 3314-2 as Internal Medicine Hospitalist services.  The patient continues to have:   1) potential stroke as CT perfusion is positive for mismatch  2) acute chest pain with concerns for ACS    The vital organ system that is affected is the: CNS    If untreated there is a high chance of deterioration into: coma, loss of mental and physical function, possible brain death    The critical care that I am providing today is:   STAT MRI  - discussed with [facility] on call vascular neurologist about CT perfusion scan results.  - pt reported chest pain, I have thoroughly evaluated ECGs obtained and discussed with on-call Cardiologist Dr. Silver. We confirmed patient has an LBBB but no STEMI. Patient's troponinemia is from hypertensive emergency, as patient presented with high , .    The  critical that has been undertaken is medically complex.  There has been no overlap in critical care time.  Critical Care Time not including procedures: 110  minutes

## 2024-02-04 PROBLEM — I63.9 ISCHEMIC STROKE (HCC): Status: ACTIVE | Noted: 2024-01-01

## 2024-02-04 NOTE — ASSESSMENT & PLAN NOTE
Confirmed acute stroke on MRI  - PT/OT recommended post acute placement  - pt and family wish to pursue [facility] inpatient rehab hospital  - I discussed thoroughly about hypertension and need for strict control.  In the past patient has been on metoprolol but has stopped medication.  He has an allergy to lisinopril causing anaphylaxis.  He is also unable to use any type of statin medication.  I explained that he is at a higher risk for having future strokes as we cannot use statins and will need to trial blood pressure medication.  Family explains he has had hypotension in the past and had to stop other antihypertensives.  At this time I highly recommend for inpatient rehab in order to be able to monitor blood pressure closely and adjust a antihypertensive regimen.

## 2024-02-04 NOTE — CARE PLAN
The patient is Watcher - Medium risk of patient condition declining or worsening    Shift Goals  Clinical Goals: q4h neuro, MRI kristopher, pain management  Patient Goals: get better  Family Goals: KAL    Progress made toward(s) clinical / shift goals:  Pt reports abdominal pain, PNRs administered, MRI with rep kristopher AM, q4h neuros and NIH, bed alarm on, call light within reach     Patient is not progressing towards the following goals:      Problem: Neuro Status  Goal: Neuro status will remain stable or improve  Outcome: Progressing     Problem: Hemodynamic Monitoring  Goal: Patient's hemodynamics, fluid balance and neurologic status will be stable or improve  Outcome: Progressing     Problem: Fall Risk  Goal: Patient will remain free from falls  Outcome: Progressing     Problem: Pain - Standard  Goal: Alleviation of pain or a reduction in pain to the patient’s comfort goal  Outcome: Progressing

## 2024-02-04 NOTE — PROGRESS NOTES
12 hour chart check complete.    Monitor summary:    Rhythm: SR with BBB    Heart Rate: 67-76    Ectopy: rPVC, rTrigemPV, rCoupPVC, rPAC    Measurements: 0.18/0.14/0.44

## 2024-02-04 NOTE — PROGRESS NOTES
"Hospital Medicine Daily Progress Note    Date of Service  2/4/2024    Chief Complaint  Francesco Schulte is a 73 y.o. male admitted 2/2/2024 with   Chief Complaint   Patient presents with    Possible Stroke     Pt. States he drove himself here. Repeatedly stating \"I'm having a major stroke. This has happened before\". Pt. Uncooperative during triage exam, stating he is feeling that he will pass out. Not answering most questions asked. Pt. Does identify R sided weakness, but non-specific about what parts on R side are weak. Pt. Also not giving time frame of symptom start despite repeatedly asking pt. For this. Pt. Also c/o fast HR.     Rapid Heart Beat     Hospital Course  73-year-old male with a cardiac history of atrial fibrillation on Eliquis, Ischemic cardiomyopathy, combined HFrEF 15-25%, prior AICD and lead extraction due to MSSA bacteremia in 11/02/2021, s new [employer] AICD placement on 05/08/2023, and \"complex CAD S/P STEMI with multiple PCI (LAD, Lcs and POBA to the digonal).\" He reported his prior device needed to be removed as it \"burned my heart.\"    Additionally, his PMHx includes a prior CVA to Right occipital, partial and frontal, BPH, recurrent ESBL E. coli, T12-L1 postherpetic neuralgia, splenomegaly from polycythemia vera following with Dr. Sims.    He was admitted on 2/2/24 with reports severe chest pain and of possible stroke symptoms. Patient had right sided weakness at 5PM in the ER, and felt faint in the ER. Stroke code was called. At the same time, patient had ongoing chest pain complaints.    CT cerebral perfusion performed in showed positive results including mismatch.    I reviewed prior stress test in 05/2021 showed severe LV dysfunction with an EF of 19%, large prior apical infarct but had no reversible ischemia at that time.  This was also seen in 01/2019.    He had a cardiac defibrillator placed on 07/2021.  He had lead extraction on 11/2021.  A new defibrillator was " placed on 05/2023.    01/25/2024 echocardiogram showed LVEF 20 to 25%, LV dilated, global hypokinesis, abnormal diastolic function, mildly dilated RV, enlarged RA, severely dilated LA, mild mitral regurgitation, moderate aortic insufficiency, mild to moderate tricuspid regurgitation, RVSP 53 mmHg, noted a small pericardial effusion without hemodynamic compromise, aortic diameter 3.6 cm    Prior cardiac MRI on 3/23/2022 showed extensive transmural infarct throughout the anterior and septal wall from midportion to the apex.  An additional transmural infarct in the mid to basal lateral wall.  Questionable aortic dissection.  Trace pericardial effusion.  CTA chest on 4/4/2023 did not show dissection, no PE, showed severe coronary calcifications.    CT thorax without contrast on 1/27/2024 showed enlarged heart with moderate pericardial effusion, trace pleural fluid, extensive triple-vessel coronary artery calcification.  No masses or pneumothorax.    Interval Problem Update  Pt reported early morning chest pain 3/10 to bedside RN.    - I called radiology, unfortunately, I am unable to confirm the CT perfusion study was notified to a physician or provider. I changed MRI to stat, approved scan with current AICD as pt had a MRI cardiac 3/23/22.  - pt reported chest pain, I have thoroughly evaluated ECGs obtained and discussed with on-call Cardiologist Dr. Silver. We confirmed patient has an LBBB but no STEMI. Patient's troponinemia is from hypertensive emergency, as patient presented with high , .    2/4:  Patient denied any right-sided weakness today  I had a full discussion with family at bedside.  I showed him the MRI results which unfortunately shows a positive acute stroke In the left thalamic region.  -We had a prolonged conversation discussing about discharge plan.  They requested to try for inpatient rehab facility.  Referral has been in place and is needing review by coordinator.  - I discussed about  hypertension, patient had been on metoprolol before.  He has severe anaphylaxis to lisinopril.  He has reported allergy to spironolactone and beta-blockers cause dizziness.  I explained we will need to start a blood pressure medication on the lowest dose and see how he tolerates.    I have discussed this patient's plan of care and discharge plan at IDT rounds today with Case Management, Nursing, Nursing leadership, and other members of the IDT team.    Consultants/Specialty  cardiology    Code Status  Full Code    Disposition  The patient is not medically cleared for discharge to home or a post-acute facility.  Anticipate discharge to: an inpatient rehabilitation hospital    I have placed the appropriate orders for post-discharge needs.    Review of Systems  Review of Systems   Constitutional:  Negative for chills, fever and malaise/fatigue.   Respiratory:  Positive for shortness of breath. Negative for cough.    Cardiovascular:  Positive for chest pain. Negative for palpitations.   Gastrointestinal:  Negative for abdominal pain, constipation, diarrhea, nausea and vomiting.   Musculoskeletal:  Negative for back pain and joint pain.   Neurological:  Negative for dizziness and headaches.   All other systems reviewed and are negative.       Physical Exam  Temp:  [36.2 °C (97.2 °F)-36.9 °C (98.4 °F)] 36.5 °C (97.7 °F)  Pulse:  [68-78] 72  Resp:  [16-18] 18  BP: (134-150)/(77-86) 135/79  SpO2:  [93 %-97 %] 95 %    Physical Exam  Vitals and nursing note reviewed.   Constitutional:       General: He is not in acute distress.     Appearance: He is not diaphoretic.      Comments: Frail, thin appearing elderly male   HENT:      Head: Normocephalic and atraumatic.      Comments: Temporal muscle wasting noted     Nose: Nose normal. No congestion.      Mouth/Throat:      Mouth: Mucous membranes are dry.      Pharynx: No oropharyngeal exudate.   Eyes:      General: No scleral icterus.     Extraocular Movements: Extraocular  movements intact.   Cardiovascular:      Rate and Rhythm: Normal rate and regular rhythm.      Pulses: Normal pulses.      Heart sounds: Normal heart sounds. No murmur heard.  Pulmonary:      Effort: Pulmonary effort is normal. No respiratory distress.      Breath sounds: Normal breath sounds. No wheezing or rales.   Abdominal:      General: Abdomen is flat. Bowel sounds are normal. There is no distension.      Palpations: Abdomen is soft.      Tenderness: There is no abdominal tenderness.   Musculoskeletal:         General: No swelling or tenderness. Normal range of motion.      Cervical back: Normal range of motion and neck supple.      Comments: Sarcopenic. B/L dorsal hands visible muscle atrophy.   Skin:     General: Skin is warm.      Capillary Refill: Capillary refill takes less than 2 seconds.      Coloration: Skin is not jaundiced or pale.   Neurological:      General: No focal deficit present.      Mental Status: He is alert and oriented to person, place, and time. Mental status is at baseline.      Motor: Weakness present.   Psychiatric:         Mood and Affect: Mood normal.         Behavior: Behavior normal.         Thought Content: Thought content normal.         Judgment: Judgment normal.         Fluids    Intake/Output Summary (Last 24 hours) at 2/4/2024 1551  Last data filed at 2/4/2024 0731  Gross per 24 hour   Intake --   Output 300 ml   Net -300 ml       Laboratory  Recent Labs     02/02/24  1603 02/03/24  0124 02/04/24  0804   WBC 9.2 7.8 9.5   RBC 6.67* 6.15* 6.14*   HEMOGLOBIN 17.8 16.2 16.2   HEMATOCRIT 54.3* 50.2 50.8   MCV 81.4 81.6 82.7   MCH 26.7* 26.3* 26.4*   MCHC 32.8 32.3 31.9*   RDW 67.7* 68.6* 69.0*   PLATELETCT 528* 419 389   MPV 9.4 9.2 10.3     Recent Labs     02/02/24  1603 02/03/24  0124 02/04/24  0804   SODIUM 135 134* 137   POTASSIUM 4.0 3.9 3.9   CHLORIDE 97 101 104   CO2 24 22 22   GLUCOSE 148* 149* 118*   BUN 20 22 22   CREATININE 1.26 1.25 1.05   CALCIUM 10.9* 9.8 9.0      Recent Labs     02/02/24  1603   APTT 40.4*   INR 1.55*               Imaging  MR-BRAIN-W/O   Final Result      1.  Acute left thalamic infarct. No evidence of hemorrhagic consideration.   2.  Right parieto-occipital region encephalomalacia with associated superficial siderosis.   3.  Small area of encephalomalacia at the left temporal lobe.   4.  Atrophy.      CT-CEREBRAL PERFUSION ANALYSIS   Final Result   Addendum (preliminary) 1 of 1   1.  Cerebral blood flow less than 30% = 8 mL. The decrease blood flow is    scattered and could be artifact.      2.  T Max more than 6 seconds  = 240 mL. There is scattered decreased TMAX    throughout the bilateral cerebral hemisphere, right is slightly more than    left. This could be artifact and sequela of prior infarct.      Final      1.  Cerebral blood flow less than 30% likely representing completed infarct = 8 mL.      2.  T Max more than 6 seconds likely representing combination of completed infarct and ischemia = 240 mL.      3.  Mismatched volume likely representing ischemic brain/penumbra = 232      4.  Please note that the cerebral perfusion was performed on the limited brain tissue around the basal ganglia region. Infarct/ischemia outside the CT perfusion sections can be missed in this study.      CT-CTA NECK WITH & W/O-POST PROCESSING   Final Result      1. No evidence of flow-limiting stenosis in the cervical carotid or cervical vertebral arteries.      CT-CTA HEAD WITH & W/O-POST PROCESS   Final Result   Addendum (preliminary) 1 of 1   There is narrowing of the right P2 segment which is improved since    10/24/2021.      Final         1. No hemodynamically significant narrowing of the major intracranial vessels.      2. Mild focal stenosis of the left mid LIS.      DX-CHEST-PORTABLE (1 VIEW)   Final Result         Diffuse groundglass opacity throughout both lungs could relate to hypoventilatory change or mild fluid overload.      CT-HEAD W/O   Final Result          1. No acute intracranial abnormality. No evidence of acute intracranial hemorrhage .      2. An encephalomalacia in the right cerebral lobe, similar to prior.                    Assessment/Plan  * Acute on chronic right upper and lower extremity weakness (HCC)- (present on admission)  Assessment & Plan  Admit to Neuro, with continuous cardiac monitoring  CT, CT-angiography, head, neck showed no acute infarction, or hemorrhage    Aspiration, Fall, and seizure precautions      Due to acute left thalamic infarct.  Discharge plan to inpatient rehab.    Left Ischemic stroke (HCC)- (present on admission)  Assessment & Plan  Confirmed acute stroke on MRI  - PT/OT recommended post acute placement  - pt and family wish to pursue [facility] inpatient rehab hospital  - I discussed thoroughly about hypertension and need for strict control.  In the past patient has been on metoprolol but has stopped medication.  He has an allergy to lisinopril causing anaphylaxis.  He is also unable to use any type of statin medication.  I explained that he is at a higher risk for having future strokes as we cannot use statins and will need to trial blood pressure medication.  Family explains he has had hypotension in the past and had to stop other antihypertensives.  At this time I highly recommend for inpatient rehab in order to be able to monitor blood pressure closely and adjust a antihypertensive regimen.    Hypertensive emergency- (present on admission)  Assessment & Plan  Causing troponinemia, 21 up to 83    Repeat troponin 53  Starting amlodipine 2.5 mg given new stroke    History of cerebrovascular accident- (present on admission)  Assessment & Plan  The patient has a history of multiple cerebrovascular accidents, and residual right-sided weakness  He is presenting with transient right-sided weakness  He has atrial fibrillation with markedly elevated [id number] score  CT, CT-angiography, head, neck showed no acute infarction or  hemorrhage    - I reviewed prior brain MRI, showed CVA more to the right occipital, but also right parietal and right frontal lobes.  - I reviewed CT perfusion, showing positive results. I changed brain MRI to STAT.  There has been a delay in MRI as the [employer] will need to turn off the AICD during the MRI.   -- > Later, I was able to confirm with Dr. Ross the results are showing false positive/artifact findings.    Chest pain- (present on admission)  Assessment & Plan  EKG shows, sinus tachycardia with a rate of 99, there is left bundle branch block with expected abnormal repolarization wave.  This is seen on prior EKGs.  QTc is 492.   Initial troponin is is elevated at 21, I will trend    Troponin elevated up to 83 and returned to 82. Showing acute demand ischemia from HTN emergency.  - pt reported chest pain, I have thoroughly evaluated ECGs obtained and discussed with on-call Cardiologist Dr. Silver. We confirmed patient has an LBBB but no STEMI. Patient's troponinemia is from hypertensive emergency, as patient presented with high , .    2/4:  Patient reported left sided minor chest pain this morning.  I ordered troponin which was 53, less than yesterday.  I gave patient IV famotidine and Zofran with benefit.    Pulmonary hypertension (HCC)- (present on admission)  Assessment & Plan  01/25/2024 echocardiogram RVSP 53 mmHg  - not on diuretics at home.    Severity  Severe 46-55    WHO difficult to discern which group  Group 2-due to left heart disease  Group 5-unknown causes perhaps polycythemia vera    ACP (advance care planning)- (present on admission)  Assessment & Plan  Full code    Chronic systolic congestive heart failure (HCC)- (present on admission)  Assessment & Plan  Not currently in exacerbation.  Monitor volume status closely    I reviewed recent echo from 01/25/2024 which showed LVEF 20 to 25%, LV dilated, global hypokinesis, abnormal diastolic function, mildly dilated RV,  enlarged RA, severely dilated LA, mild mitral regurgitation, moderate aortic insufficiency, mild to moderate tricuspid regurgitation, RVSP 53 mmHg, noted a small pericardial effusion without hemodynamic compromise, aortic diameter 3.6 cm    Polycythemia vera (HCC)- (present on admission)  Assessment & Plan  Following with oncology, Dr. Sims  On Hydroxyurea    Essential hypertension- (present on admission)  Assessment & Plan  Presented with HTN emergency  Patient had been on metoprolol before.  He has severe anaphylaxis to lisinopril.  He has reported allergy to spironolactone and beta-blockers cause dizziness.    Starting amlodipine 2.5 mg.    Type 2 diabetes mellitus with hyperglycemia (HCC)- (present on admission)  Assessment & Plan  With no significant hyperglycemia  Last glycated hemoglobin was 6%  Accuchecks, ISS and hypoglycemia protocol  Hold home Farxiga and Tresiba    Paroxysmal A-fib (HCC)- (present on admission)  Assessment & Plan  Continue Eliquis  Not currently on blocking agents  Beta-blockers are listed as side effect of dizziness and stopped before.  He was on metoprolol in the past.    Dyslipidemia- (present on admission)  Assessment & Plan  Cardiac diet  Intolerance to Statins         VTE prophylaxis:    therapeutic anticoagulation with eliquis 5 mg BID      I have performed a physical exam and reviewed and updated ROS and Plan today (2/4/2024). In review of yesterday's note (2/3/2024), there are no changes except as documented above.      Total time spent 51 minutes. I spent greater than 50% of the time for patient care, including unit/floor time, multiple face-to-face encounters with the patient, counseling on treatment plan and discussion with bedside RN.  Further, I spent time on my own review of patient's overnight events, RN notes, imaging and lab analysis, and developing my assessment and plan above.  In addition, working with , social workers, and Charge RN on case  coordination on this date.

## 2024-02-04 NOTE — CARE PLAN
The patient is Stable - Low risk of patient condition declining or worsening    Shift Goals  Clinical Goals: MRI, manage GI s/s  Patient Goals: MRI  Family Goals: none present    Progress made toward(s) clinical / shift goals:      Patient is not progressing towards the following goals:

## 2024-02-04 NOTE — PROGRESS NOTES
Bariatric Post-Operative Phone Calls: 48 hour phone call    Diet:Question of any nausea and/or vomiting. Protein intake (goal is 60 grams of protein daily)   Poor____Fair____Good_x___Great____     Comment:________50 grams______________________________________________________      ______________________________________________________________________    Hydration:Less than 32 ounces of water daily is fair to poor (Goal is 64 ounces per day)   Poor____ Fair____ Good____Great__x__    Comment:______________________________________________________________    ______________________________________________________________________      Ambulation:( walking at least 3 x week, for 15- 20 minutes)     Poor______ Fair___x___ Good__x____     Great______ Comment:___walking a little due to incisional pain_______________________________________________    ______________________________________________________________________      Urine Color: Question of any odor and color(should be karsno, pale, and clear) Dark______ Amber______ Pale___x___      Clear______ Comment:___________________________________________________                           ________________________________________________________________    Bowel movements: Question of any constipation- haven't had any bowel movements for more than 3 days. This could be related to protein intake and/or narcotic pain medication usage. Comment:                                                             Moving her bowels                                                                 Pain: Left sided abdominal pain is normal (should be less than 3)  Question if pain medication is helpful. 10___ 9___ 8___ 7___ 6___ 5___ 4___ 3___     2___1___0___Comment:_sitting still no pain, a 8,9,10 when she gets up  Has chronic back pain  taking 4 pain pills a day. _she said she had the same pain in the hospital and Dr Reginaldo Evans was aware.  she said the pain is getting better every Monitor Summary:    Rhythm:  SR, BBB   Rate Range:  69-77  Ectopy: R/fPVC/PAC, rCOUP    Measurements:  0.20/0.12/0.44  (Measured by monitor tech)   day. she is using heat to the area._______________________________________________    ______________________________________________________________________      Incision: (No redness, pain, swelling or fever) Healing Well___x___     Healed______Redness_________ Pain_________     Swelling_________ Fever__________(greater than 101 needs evaluation)    Comment:____________________________________________________________    ______________________________________________________________________  Use of incentive spirometer: Yes__x__       No           Next Appointment:___5/30/19___________                 Support Group: Yes______No___x___    Additional Comments:____________________________________________________________    ____________________________________________________________________      If more than one parameter is not met or considered poor, nurse needs to discuss with provider recommend for patient to be seen in the office as soon as possible or refer to the provider for follow-up. Reinforce to patient to use bariatric educational booklet as guide. It is appropriate to refer patient to the nutritionist to discuss more in detail of diet and nutrition.

## 2024-02-04 NOTE — THERAPY
Occupational Therapy   Initial Evaluation     Patient Name: Francesco Schulte  Age:  73 y.o., Sex:  male  Medical Record #: [id number]  Today's Date: 2/3/2024     Precautions  Precautions: (P) Fall Risk  Comments: high    Assessment  Patient is 73 y.o. male admitted with chest pain, R sided weakness. R/O stroke. Additional factors influencing patient status / progress: prior CVA to R occipital, parietal and frontal (2016), BPH, EF of 19%, complex CAD, heart defibrillator in place, T12-L1 postherpetic neuralgia. CT shows no acute infarct; MRI pending. Pt is A&Ox3, odd affect. 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 Lives with spouse who works. Currently limited by decreased functional mobility and activity tolerance, impaired balance and safety which impact his ADL performance; see below for CLOF. BUE strength, ROM, sensation- equal and WFL. Vision- intact. Pt is not at his baseline level; OT will follow while in house.              Plan  Occupational Therapy Initial Treatment Plan   Treatment Interventions: (P) Self Care / Activities of Daily Living, Neuro Re-Education / Balance, Therapeutic Activity  Treatment Frequency: (P) 3 Times per Week  Duration: (P) Until Therapy Goals Met    DC Equipment Recommendations: (P) Unable to determine at this time  Discharge Recommendations: (P) Recommend post-acute placement for additional occupational therapy services prior to discharge home (vs  depending upon progress.)     Subjective  Agreeable     Objective     02/03/24 1640   Prior Living Situation   Prior Services None   Housing / Facility [address]   Bathroom Set up Walk In Shower;Shower Chair   Equipment Owned Single Point Cane;Front-Wheel Walker;Tub / Shower Seat   Lives with - Patient's Self Care Capacity Spouse   Comments Reports being very active PTA. Spouse works as a teacher per pt.   Prior Level of ADL Function   Self Feeding Independent   Grooming / Hygiene Independent   Bathing  Independent   Dressing Independent   Toileting Independent   Prior Level of IADL Function   Medication Management Independent   Laundry Independent   Kitchen Mobility Independent   Finances Independent   Home Management Independent   Shopping Independent   Prior Level Of Mobility Independent With Device in Home   Driving / Transportation Driving Independent   Occupation (Pre-Hospital Vocational) Employed Part Time   Leisure Interests Hobbies   Precautions   Precautions Fall Risk   Vitals   O2 Delivery Device None - Room Air   Cognition    Cognition / Consciousness X   Level of Consciousness Alert   Comments poor safety judgment   Passive ROM Upper Body   Passive ROM Upper Body WDL   Active ROM Upper Body   Active ROM Upper Body  WDL   Dominant Hand Right   Strength Upper Body   Upper Body Strength  WDL   Comments equal   Sensation Upper Body   Upper Extremity Sensation  WDL   Upper Body Muscle Tone   Upper Body Muscle Tone  WDL   Neurological Concerns   Neurological Concerns   (CVA in '16)   Coordination Upper Body   Coordination WDL   Balance Assessment   Sitting Balance (Static) Good   Sitting Balance (Dynamic) Fair +   Standing Balance (Static) Fair   Standing Balance (Dynamic) Fair -   Weight Shift Sitting Fair   Weight Shift Standing Fair   Bed Mobility    Supine to Sit Modified Independent   Sit to Supine Modified Independent   Scooting Independent   ADL Assessment   Grooming Standby Assist;Standing   Lower Body Dressing Contact Guard Assist   Toileting Contact Guard Assist   How much help from another person does the patient currently need...   Putting on and taking off regular lower body clothing? 3   Bathing (including washing, rinsing, and drying)? 3   Toileting, which includes using a toilet, bedpan, or urinal? 3   Putting on and taking off regular upper body clothing? 4   Taking care of personal grooming such as brushing teeth? 4   Eating meals? 4   6 Clicks Daily Activity Score 21   Functional Mobility    Sit to Stand Contact Guard Assist   Bed, Chair, Wheelchair Transfer Contact Guard Assist   Toilet Transfers Contact Guard Assist   Transfer Method Stand Step   Comments SPC. Declines FWW.   Visual Perception   Visual Perception  WDL   Activity Tolerance   Sitting in Chair 10   Sitting Edge of Bed 12   Standing 5   Patient / Family Goals   Patient / Family Goal #1 Home   Short Term Goals   Short Term Goal # 1 ADL transfers with Spv within 5 days   Short Term Goal # 2 FB dressing with Spv within 5 days   Short Term Goal # 3 Toileting in br with Spv within 5 days   Education Group   Role of Occupational Therapist Patient Response Patient;Acceptance;Explanation;Verbal Demonstration   Occupational Therapy Initial Treatment Plan    Treatment Interventions Self Care / Activities of Daily Living;Neuro Re-Education / Balance;Therapeutic Activity   Treatment Frequency 3 Times per Week   Duration Until Therapy Goals Met   Problem List   Problem List Decreased Active Daily Living Skills;Decreased Homemaking Skills;Decreased Functional Mobility;Decreased Activity Tolerance;Safety Awareness Deficits / Cognition;Impaired Postural Control / Balance   Anticipated Discharge Equipment and Recommendations   DC Equipment Recommendations Unable to determine at this time   Discharge Recommendations Recommend post-acute placement for additional occupational therapy services prior to discharge home  (vs  depending upon progress.)   Interdisciplinary Plan of Care Collaboration   IDT Collaboration with  Nursing;Certified Nursing Assistant   Patient Position at End of Therapy In Bed;Bed Alarm On;Call Light within Reach;Tray Table within Reach;Phone within Reach   Collaboration Comments OT Mima. IMER planning.

## 2024-02-05 PROBLEM — I63.9 LEFT SIDED CEREBRAL HEMISPHERE CEREBROVASCULAR ACCIDENT (CVA) (HCC): Status: ACTIVE | Noted: 2024-01-01

## 2024-02-05 PROBLEM — I63.81 LEFT THALAMIC INFARCTION (HCC): Status: ACTIVE | Noted: 2024-01-01

## 2024-02-05 NOTE — DISCHARGE PLANNING
Approved transfer Dr. Starks is accepting [facility] arranged for [phone]  Attending team/Patient notified.

## 2024-02-05 NOTE — THERAPY
"Physical Therapy   Daily Treatment     Patient Name: Francesco Schulte  Age:  73 y.o., Sex:  male  Medical Record #: [id number]  Today's Date: 2/5/2024     Precautions  Precautions: Fall Risk    Assessment    Pt continues to have R LE weakness, impaired coordination, overall poor safety and judgement but also is pleasant and motivated to recover from CVA. Balance continues to be impaired, Amina with SPC, multiple LOB while amb but refuses to use a FWW at this time. Pt states spouse works so may be home alone at times and is a high fall risk. Recommend acute rehab.    Plan    Treatment Plan Status: Continue Current Treatment Plan  Type of Treatment: Gait Training, Neuro Re-Education / Balance, Therapeutic Activities, Therapeutic Exercise  Treatment Frequency: 4 Times per Week    DC Equipment Recommendations: Unable to determine at this time  Discharge Recommendations: Recommend post-acute placement for additional physical therapy services prior to discharge home (acute rehab)       02/05/24 1001   Cognition    Comments Pt agreeable for PT, is interested to go to rehab, motivated. Pt refuses to use a FWW, \"I will never use a walker, I'm fine with my cane\"   Strength Lower Body   Comments R hip 4-/5, R knee flex/ext 4/5, R DF/PF 4/5   Lower Body Muscle Tone   Lower Body Muscle Tone  WDL   Neurological Concerns   Comments impaired coordination R LE heel to shin, and alternate toe tapping   Balance   Sitting Balance (Static) Fair   Sitting Balance (Dynamic) Fair -   Standing Balance (Static) Poor +   Standing Balance (Dynamic) Poor   Weight Shift Sitting Fair   Weight Shift Standing Fair   Skilled Intervention Postural Facilitation;Sequencing;Tactile Cuing;Verbal Cuing   Comments stdg with SPC   Bed Mobility    Supine to Sit Supervised   Sit to Supine Supervised   Gait Analysis   Gait Level Of Assist Minimal Assist   Assistive Device Single Point Cane   Distance (Feet) 150   # of Times Distance was Traveled 1 "   Deviation Ataxic;Increased Base Of Support   Comments Multiple LOB, ataxic at times, refuses to use a walker   Functional Mobility   Sit to Stand Contact Guard Assist   Activity Tolerance   Standing 7-8 min   Short Term Goals    Short Term Goal # 1 I transfers in 6 V   Goal Outcome # 1 goal not met   Short Term Goal # 2 I amb x 200 feet in 6 V   Goal Outcome # 2 Goal not met

## 2024-02-05 NOTE — DISCHARGE PLANNING
Following for post acute services. Spoke with Bob about goals and expectation for IRF level of care. Discussed therapy plan for 3 hours per day 5 days a week. Discussed therapy schedules 30/45 or 60 minute sessions typically 1.5 hours between breakfast and lunch and another 1.5 hours between lunch and dinner. Discussed PT/OT and SLP roles. Discussed potential length of stay. Discussed comprehensive medical surveillance by physiatry as well as hospitalist team. Discussed patient to nursing ratio. Discussed insurance Medicare  coverage for the program. Discussed visitation and clothing requirements. Alysa will be primary support for Bob  to return to the community. Discussed participation with therapy program when visiting.  Family/ patient rehab goals are  to achieve MOD I  with transfers and mobility, self care . Home is a 1 story with 1 step(s) to enter.  Discussed discharge planner role and team conference. DME in the home includes a none.  In the event of additional support need discussed HH and OP. Pending physiatry recommendation.

## 2024-02-05 NOTE — DISCHARGE SUMMARY
"Discharge Summary    CHIEF COMPLAINT ON ADMISSION  Chief Complaint   Patient presents with    Possible Stroke     Pt. States he drove himself here. Repeatedly stating \"I'm having a major stroke. This has happened before\". Pt. Uncooperative during triage exam, stating he is feeling that he will pass out. Not answering most questions asked. Pt. Does identify R sided weakness, but non-specific about what parts on R side are weak. Pt. Also not giving time frame of symptom start despite repeatedly asking pt. For this. Pt. Also c/o fast HR.     Rapid Heart Beat       Reason for Admission  Stroke Symptoms    Admission Date  2/2/2024     CODE STATUS  Full Code    HPI & HOSPITAL COURSE  Bob Schulte is a 73-year-old male with a cardiac history of atrial fibrillation on Eliquis, Ischemic cardiomyopathy, combined HFrEF 15-25%, prior AICD and lead extraction due to MSSA bacteremia in 11/02/2021, s new [employer] AICD placement on 05/08/2023, and \"complex CAD S/P STEMI with multiple PCI (LAD, Lcs and POBA to the digonal).\" He reported his prior device needed to be removed as it \"burned my heart.\"      Additionally, his PMHx includes a prior CVA to Right occipital, partial and frontal, BPH, recurrent ESBL E. coli, T12-L1 postherpetic neuralgia, splenomegaly from polycythemia vera following with Dr. Sims.    He was admitted on 2/2/24 with reports severe chest pain and of possible stroke symptoms. Patient had right sided weakness at 5PM in the ER, and felt faint in the ER. Stroke code was called.    For patient's right sided weakness on arrival to the ER:  I reviewed prior brain MRI, showed CVA more to the right occipital, but also right parietal and right frontal lobes.  I reviewed CT perfusion, showing positive results. Later, I was able to confirm with Dr. Ross (reading radiologist) the CT perfusion results were showing false positive or artifact findings.  At this time, his new infarct is due to hypertensive " emergency.    There was a delay in MRI as the Stio needed to turn off the AICD during the MRI.  We were able to confirm patient has an acute left thalamic infarct.    I discussed thoroughly about hypertension and need for strict control.  In the past patient has been on metoprolol but has stopped medication.  He has an allergy to lisinopril causing anaphylaxis.  He is also unable to use any type of statin medication.      I explained that Mr. Schulte is at higher risk for having future strokes as we cannot use statins and will need to trial blood pressure medication.  Family explained he has had hypotension in the past and had to stop other antihypertensives.  At this time I highly recommend for inpatient rehab in order to be able to monitor blood pressure closely and adjust a antihypertensive regimen.   I started amlodipine 2.5 mg and increase to 5 mg on discharge. PT and OT determined he would need postacute placements and patient was accepted to inpatient rehab.     For patient's chest pain:  I reviewed prior stress test in 05/2021 showed severe LV dysfunction with an EF of 19%, large prior apical infarct but had no reversible ischemia at that time.  This was also seen in 01/2019.    He had a cardiac defibrillator placed on 07/2021.  He had lead extraction on 11/2021.  A new defibrillator was placed on 05/2023.    01/25/2024 echocardiogram showed LVEF 20 to 25%, LV dilated, global hypokinesis, abnormal diastolic function, mildly dilated RV, enlarged RA, severely dilated LA, mild mitral regurgitation, moderate aortic insufficiency, mild to moderate tricuspid regurgitation, RVSP 53 mmHg, noted a small pericardial effusion without hemodynamic compromise, aortic diameter 3.6 cm    Prior cardiac MRI on 3/23/2022 showed extensive transmural infarct throughout the anterior and septal wall from midportion to the apex.  An additional transmural infarct in the mid to basal lateral wall.  Questionable  aortic dissection.  Trace pericardial effusion.  CTA chest on 4/4/2023 did not show dissection, no PE, showed severe coronary calcifications.    CT thorax without contrast on 1/27/2024 showed enlarged heart with moderate pericardial effusion, trace pleural fluid, extensive triple-vessel coronary artery calcification.  No masses or pneumothorax.    Mr. Schulte's troponins elevated up to 83 and returned to 82.  He reported ongoing chest pain, and I had thoroughly evaluated ECGs obtained and discussed with on-call Cardiologist Dr. Silver. We confirmed patient has an LBBB but no STEMI, there was no warrant for any angiography.  Patient's troponinemia is from demand ischemia from hypertensive emergency, as patient presented with high , .      Therefore, he is discharged in fair and stable condition to an inpatient rehabilitation hospital.    The patient met 2-midnight criteria for an inpatient stay at the time of discharge.      FOLLOW UP ITEMS POST DISCHARGE  With PCP for hypertension    DISCHARGE DIAGNOSES  Principal Problem:    Acute on chronic right upper and lower extremity weakness (HCC) (POA: Yes)  Active Problems:    Chest pain (POA: Yes)      Overview: IMO load March 2020    History of cerebrovascular accident (POA: Yes)    Hypertensive emergency (POA: Yes)    Left Ischemic stroke (HCC) (POA: Yes)    Dyslipidemia (POA: Yes)    Paroxysmal A-fib (HCC) (Chronic) (POA: Yes)    Type 2 diabetes mellitus with hyperglycemia (HCC) (Chronic) (POA: Yes)      Overview: This is a chronic condition uncontrolled, managed by endocrinology Dr. Mckeon, patient has not seen him for over a year now. His current       regimen includes Tresiba `12 Units daily and Farxiga 0.5 tablet daily      Today's A1c is pending.            Lab Results       Component Value Date/Time        HBA1C 8.0 (H) 08/02/2023 09:57 AM                    Lab Results       Component Value Date/Time        HBA1C 7.2 (H) 04/04/2023 06:07 PM                           Lab Results       Component Value Date/Time        HBA1C 8.4 (H) 07/06/2022 10:25 AM              Last diabetic foot exam: 07/26/2022      Last retinal eye exam: POCT retinal screen in office.      ACEi/ARB: Lisinopril, on Farxyga       Statin: intolerant       Aspirin: no, on Eliquis      Concomitant HTN: no                Essential hypertension (POA: Yes)    Polycythemia vera (HCC) (POA: Yes)      Overview: Chronic, stable, most recent bone marrow biopsy consistent with       polycythemia vera.       Current regimen:was on Hydroxy      She follows with hematology.     Chronic systolic congestive heart failure (HCC) (POA: Yes)    ACP (advance care planning) (POA: Yes)    Pulmonary hypertension (HCC) (Chronic) (POA: Yes)  Resolved Problems:    * No resolved hospital problems. *      FOLLOW UP  Future Appointments   Date Time Provider Department Center   2/7/2024  8:40 AM Perez Lentz D.O. [facility] None     Tala Dunne M.D.  [address]  [address]  [place] [address]  [phone]    Schedule an appointment as soon as possible for a visit in 1 week(s)      Perez Lentz D.O.  [address]  [address]  [place] [address]  [phone]    Go on 2/7/2024  go to next cardiology appointment      MEDICATIONS ON DISCHARGE     Medication List        START taking these medications        Instructions   amLODIPine 5 MG Tabs  Start taking on: February 6, 2024  Commonly known as: Norvasc   Take 1 Tablet by mouth every day.  Dose: 5 mg            CONTINUE taking these medications        Instructions   apixaban 5mg Tabs  Commonly known as: Eliquis   Take 1 Tablet by mouth 2 times a day.  Dose: 5 mg     dicyclomine 10 MG Caps  Commonly known as: Bentyl   TAKE 1 CAPSULE BY MOUTH EVERY 6 HOURS AS NEEDED (ABDOMINAL CRAMPS).  Dose: 10 mg     Farxiga 5 MG Tabs  Generic drug: dapagliflozin propanediol   Take 5 mg by mouth every day.  Dose: 5 mg     finasteride 5 MG Tabs  Commonly known as:  "Proscar   Doctor's comments: DX Code Needed  .  TAKE 1 TABLET BY MOUTH EVERY DAY  Dose: 5 mg     hydroxyurea 500 MG Caps  Commonly known as: Hydrea   Take 500 mg by mouth every morning.  Dose: 500 mg     tamsulosin 0.4 MG capsule  Commonly known as: Flomax   Take 1 Capsule by mouth every day.  Dose: 0.4 mg     Tresiba FlexTouch 100 UNIT/ML Sopn  Generic drug: Insulin Degludec   Inject 10 Units under the skin every morning.  Dose: 10 Units              Allergies  Allergies   Allergen Reactions    Atorvastatin      States he  when he took it and required life saving measures    Doxycycline      Swelling lips and difficulties swallowing    Lisinopril Anaphylaxis     Pt coded    Simvastatin      States he  while taking a statin and required life saving measures    Statins [Hmg-Coa-R Inhibitors]      States he  while taking a statin and required life saving measures    Beta Adrenergic Blockers Unspecified     dizziness    Eplerenone Unspecified     Intolerance, dehydration, altered mental status    Metformin Palpitations and Unspecified     Cardiac effects  \"Similar to a heart attack, I was hospitalized\"    Spironolactone Palpitations       DIET  Orders Placed This Encounter   Procedures    Diet Order Diet: Cardiac; Second Modifier: (optional): Consistent CHO (Diabetic)     Standing Status:   Standing     Number of Occurrences:   1     Order Specific Question:   Diet:     Answer:   Cardiac [6]     Order Specific Question:   Second Modifier: (optional)     Answer:   Consistent CHO (Diabetic) [4]       ACTIVITY  As tolerated and directed by rehab.  Weight bearing as tolerated    LINES, DRAINS, AND WOUNDS  This is an automated list. Peripheral IVs will be removed prior to discharge.  Peripheral IV 24 18 G Left;Posterior Forearm (Active)   Site Assessment Dry;Intact;Clean 24   Dressing Type Transparent Film 24   Line Status Saline locked;Scrubbed the hub prior to access;Flushed " 02/04/24 1940   Dressing Status Clean;Dry;Intact 02/04/24 1940   Dressing Intervention N/A 02/04/24 1940   Infiltration Grading ([facility], [facility]) 0 02/04/24 1940   Phlebitis Scale ([facility] Only) 0 02/04/24 1940       Wound 08/20/22 Pelvis Anterior (Active)       Wound 05/08/23 Incision Sternum;Rib cage Mid Left sub q ICD insertion sites (Active)       Wound 11/08/23 Incision Hip Left (Active)       Wound 02/02/24 Pressure Injury Sacrum Bilateral (Active)   Wound Image   02/02/24 2027   Dressing Status Clean;Dry;Intact 02/04/24 1940   Dressing Changed Observed 02/04/24 1940   Dressing Options Offloading Dressing - Sacral 02/04/24 1940       Wound 02/02/24 Pressure Injury Foot Right (Active)   Wound Image     02/02/24 2027   Dressing Status Open to Air 02/04/24 1940       Wound 02/02/24 Foot Left (Active)   Wound Image     02/02/24 2027   Site Assessment Clean;Dry;Intact 02/04/24 0800       Peripheral IV 02/02/24 18 G Left;Posterior Forearm (Active)   Site Assessment Dry;Intact;Clean 02/04/24 1940   Dressing Type Transparent Film 02/04/24 1940   Line Status Saline locked;Scrubbed the hub prior to access;Flushed 02/04/24 1940   Dressing Status Clean;Dry;Intact 02/04/24 1940   Dressing Intervention N/A 02/04/24 1940   Infiltration Grading ([facility], [facility]) 0 02/04/24 1940   Phlebitis Scale ([facility] Only) 0 02/04/24 1940               MENTAL STATUS ON TRANSFER   AOx4          CONSULTATIONS  none    PROCEDURES  none    LABORATORY  Lab Results   Component Value Date    SODIUM 137 02/04/2024    POTASSIUM 3.9 02/04/2024    CHLORIDE 104 02/04/2024    CO2 22 02/04/2024    GLUCOSE 118 (H) 02/04/2024    BUN 22 02/04/2024    CREATININE 1.05 02/04/2024        Lab Results   Component Value Date    WBC 9.5 02/04/2024    HEMOGLOBIN 16.2 02/04/2024    HEMATOCRIT 50.8 02/04/2024    PLATELETCT 389 02/04/2024      MR-BRAIN-W/O   Final Result      1.  Acute left thalamic infarct. No evidence of hemorrhagic consideration.   2.  Right  parieto-occipital region encephalomalacia with associated superficial siderosis.   3.  Small area of encephalomalacia at the left temporal lobe.   4.  Atrophy.      CT-CEREBRAL PERFUSION ANALYSIS   Final Result   Addendum (preliminary) 1 of 1   1.  Cerebral blood flow less than 30% = 8 mL. The decrease blood flow is    scattered and could be artifact.      2.  T Max more than 6 seconds  = 240 mL. There is scattered decreased TMAX    throughout the bilateral cerebral hemisphere, right is slightly more than    left. This could be artifact and sequela of prior infarct.      Final      1.  Cerebral blood flow less than 30% likely representing completed infarct = 8 mL.      2.  T Max more than 6 seconds likely representing combination of completed infarct and ischemia = 240 mL.      3.  Mismatched volume likely representing ischemic brain/penumbra = 232      4.  Please note that the cerebral perfusion was performed on the limited brain tissue around the basal ganglia region. Infarct/ischemia outside the CT perfusion sections can be missed in this study.      CT-CTA NECK WITH & W/O-POST PROCESSING   Final Result      1. No evidence of flow-limiting stenosis in the cervical carotid or cervical vertebral arteries.      CT-CTA HEAD WITH & W/O-POST PROCESS   Final Result   Addendum (preliminary) 1 of 1   There is narrowing of the right P2 segment which is improved since    10/24/2021.      Final         1. No hemodynamically significant narrowing of the major intracranial vessels.      2. Mild focal stenosis of the left mid LIS.      DX-CHEST-PORTABLE (1 VIEW)   Final Result         Diffuse groundglass opacity throughout both lungs could relate to hypoventilatory change or mild fluid overload.      CT-HEAD W/O   Final Result         1. No acute intracranial abnormality. No evidence of acute intracranial hemorrhage .      2. An encephalomalacia in the right cerebral lobe, similar to prior.                   Total time of the  discharge process exceeds 35 minutes.

## 2024-02-05 NOTE — PROGRESS NOTES
12 hour chart check complete.     Monitor summary:     Rhythm: SR with BBB     Heart Rate: 83-89     Ectopy: rPVC, rTrigemPV, rCoupPVC, rPAC     Measurements: 0.18/0.12/0.36

## 2024-02-05 NOTE — H&P
"  Physical Medicine & Rehabilitation  History and Physical (H&P)  &     Post Admission Physician Evaluation (CYNTHIA)       Date of Admission: 2/5/2024  Date of Service: 2/5/2024   Francesco Schulte    [facility] Code to Support Admission: 0001.2 - Stroke: Right Body Involvement (Left Brain)  Etiologic Diagnosis: Left thalamic infarction (HCC)    _______________________________________________    Chief Complaint: Decreased mobility, weakness    History of Present Illness:  Patient is a 73 y.o. male with a PMH of DM2, HTN, A fib, CHF with EF of 15-25% s/p AICD, HLD, Lupus, and polycythemia vera who presented with new right sided weakness on 2/2/24. CT and MRI were consistent with right occipital CVA. Patient reportedly cannot have statins and has had a history of low SBP. He was started on Amlodipine and monitored.  Patient with elevated troponins which were trended down.     Patient tolerated transfer to [facility]. He reports his right sided weakness has improved rapidly. He reports as of yesterday he was not able to lift his leg and now can. He reports no loss in sensation. He reports his foot does not drag if he concentrates. Denies NVD. Denies SOB.    Review of Systems:     Comprehensive 14 point ROS was reviewed and all were negative except as noted elsewhere in this document.     Past Medical History:  Past Medical History:   Diagnosis Date    Acute bacterial endocarditis 11/02/2021    Agent orange exposure     Anesthesia     resistant to pain meds and \"numbing medications\"    Cancer (HCC)     polycythemia vera    Catheter-associated urinary tract infection (HCC) 08/09/2022    This is a new problem, patient has had urinary catheter inserted in emergency room 4 weeks ago due to urinary retention.  He has been experiencing subjective fever, right flank pain.  He tells me he has experienced similar symptoms are when he was diagnosed with urinary tract infection last time.          Diabetes (HCC)     insulin and oral meds    " Family history of punctured lung 2002    left lung    Heart attack (HCC) 03/2017    Followed by [facility]    Hemorrhagic disorder (HCC)     on blood thinners    High cholesterol     Ischemic cardiomyopathy     Kidney stones     hx of kidney stones    Lupus (HCC) 11/15/2019    Orthostatic hypotension 03/29/2018    Pain     headache pain    Polycythemia vera(238.4)     Stroke (HCC) 2016    r/t afib; eye issues resolved afterward    Urinary bladder disorder     enlarged prostate, weak stream, difficulty urinating    Vertigo        Past Surgical History:  Past Surgical History:   Procedure Laterality Date    NE DX BONE MARROW BIOPSIES Left 11/8/2023    Procedure: BIOPSY, BONE MARROW, USING NEEDLE OR TROCAR;  Surgeon: Ingrid Zambrano M.D.;  Location: SURGERY SAME DAY [facility];  Service: Orthopedics    BONE ASPIRATION BIOPSY Left 11/8/2023    Procedure: BONE MARROW BIOPSY AND ASPIRATION - DR. ANDERSON;  Surgeon: Ingrid Zambrano M.D.;  Location: SURGERY SAME DAY [facility];  Service: Orthopedics    PAMELA N/A 10/30/2021    Procedure: ECHOCARDIOGRAM, TRANSESOPHAGEAL;  Surgeon: Beau Gutierrez M.D.;  Location: [facility];  Service: Cardiac    AICD IMPLANT  07/29/2021    [employer] D233 implanted by Dr. Isaac.    SPLIT THICKNESS SKIN GRAFT N/A 8/23/2020    Procedure: APPLICATION, GRAFT, SKIN, SPLIT-THICKNESS;  Surgeon: Elisa Craig M.D.;  Location: [facility];  Service: Plastics    SKIN ABSCESS INCISION AND DRAINAGE Right 8/3/2020    Procedure: INCISION AND DRAINAGE - SCROTAL WOUND - WOUND VAC PLACEMENT;  Surgeon: Jaylen Hayes M.D.;  Location: [facility];  Service: Urology    NE EXPLORE SCROTUM Right 7/31/2020    Procedure: EXPLORATION, SCROTUM - FOR WASH OUT OF SCROTAL WOUND & WOUND VAC PLACEMENT;  Surgeon: Ferny Rosales M.D.;  Location: [facility];  Service: Urology    NE EXPLORE SCROTUM Right 7/29/2020    Procedure: EXPLORATION, SCROTUM - FOR I & D OF SCROTAL  AND  GROIN WOUND;  Surgeon: Donta Viera M.D.;  Location: SURGERY [facility];  Service: Urology    NV INCIS/DRAIN SCROTUM/TESTIS,EPIDIDYM Right 7/25/2020    Procedure: INCISION AND DRAINAGE, SCROTUM;  Surgeon: Nick Negro M.D.;  Location: SURGERY [facility];  Service: Urology    TEMPORAL ARTERY BIOPSY Right 6/26/2018    Procedure: TEMPORAL ARTERY BIOPSY;  Surgeon: Rajendra Aguilar M.D.;  Location: SURGERY SAME DAY [facility];  Service: General    CAROTID ENDARTERECTOMY Right 3/21/2018    Procedure: CAROTID ENDARTERECTOMY- W/EEG MONITORING;  Surgeon: Benny Morfin M.D.;  Location: SURGERY [facility];  Service: General    APPENDECTOMY      CATH PLACEMENT      right arm    LAMINOTOMY      diskectomy; C4    OTHER CARDIAC SURGERY      stents x 3       Family History:  Family History   Problem Relation Age of Onset    Ovarian Cancer Neg Hx     Diabetes Neg Hx        Medications:  Current Facility-Administered Medications   Medication Dose    Respiratory Therapy Consult      Pharmacy Consult Request ...Pain Management Review 1 Each  1 Each    hydrALAZINE (Apresoline) tablet 25 mg  25 mg    acetaminophen (Tylenol) tablet 650 mg  650 mg    senna-docusate (Pericolace Or Senokot S) 8.6-50 MG per tablet 2 Tablet  2 Tablet    And    polyethylene glycol/lytes (Miralax) Packet 1 Packet  1 Packet    omeprazole (PriLOSEC) capsule 20 mg  20 mg    mag hydrox-al hydrox-simeth (Maalox Plus Es Or Mylanta Ds) suspension 20 mL  20 mL    ondansetron (Zofran ODT) dispertab 4 mg  4 mg    Or    ondansetron (Zofran) syringe/vial injection 4 mg  4 mg    traZODone (Desyrel) tablet 50 mg  50 mg    sodium chloride (Ocean) 0.65 % nasal spray 2 Spray  2 Spray    oxyCODONE immediate-release (Roxicodone) tablet 5 mg  5 mg    Or    oxyCODONE immediate release (Roxicodone) tablet 10 mg  10 mg    [START ON 2/6/2024] amLODIPine (Norvasc) tablet 5 mg  5 mg    apixaban (Eliquis) tablet 5 mg  5 mg    aspirin EC tablet 81  mg  81 mg    [START ON 2024] finasteride (Proscar) tablet 5 mg  5 mg    [START ON 2024] insulin GLARGINE (Lantus,Semglee) injection  10 Units    insulin regular (HumuLIN R,NovoLIN R) injection  1-6 Units    And    dextrose 50% (D50W) injection 25 g  25 g    [START ON 2024] tamsulosin (Flomax) capsule 0.4 mg  0.4 mg    dicyclomine (Bentyl) tablet 10 mg  10 mg       Allergies:  Atorvastatin, Doxycycline, Lisinopril, Simvastatin, Statins [hmg-coa-r inhibitors], Beta adrenergic blockers, Eplerenone, Metformin, and Spironolactone    Psychosocial History:  Housing / Facility: 1 Story House  Steps Into Home: 0  Steps In Home: 0  Lives with - Patient's Self Care Capacity: Spouse  Equipment Owned: Single Point Cane, Front-Wheel Walker, Tub / Shower Seat     Prior Level of Function / Living Situation:   Physical Therapy: Prior Services: None  Housing / Facility: 1 Story House  Steps Into Home: 0  Steps In Home: 0  Bathroom Set up: Walk In Shower, Shower Chair  Equipment Owned: Single Point Cane, Front-Wheel Walker, Tub / Shower Seat  Lives with - Patient's Self Care Capacity: Spouse  Bed Mobility: Independent  Transfer Status: Independent  Ambulation: Independent  Assistive Devices Used: Single Point Cane  Current Level of Function:   Gait Level Of Assist: Minimal Assist  Assistive Device: Single Point Cane  Distance (Feet): 150  Deviation: Ataxic, Increased Base Of Support  # of Stairs Climbed: 0  Weight Bearing Status: full  Supine to Sit: Supervised  Sit to Supine: Supervised  Scooting: Independent  Sit to Stand: Contact Guard Assist  Bed, Chair, Wheelchair Transfer: Contact Guard Assist  Toilet Transfers: Contact Guard Assist  Transfer Method: Stand Step  Sitting in Chair: 10  Sitting Edge of Bed: 12  Standin-8 min  Occupational Therapy:   Self Feeding: Independent  Grooming / Hygiene: Independent  Bathing: Independent  Dressing: Independent  Toileting: Independent  Medication Management:  "Independent  Laundry: Independent  Kitchen Mobility: Independent  Finances: Independent  Home Management: Independent  Shopping: Independent  Prior Level Of Mobility: Independent With Device in Home  Driving / Transportation: Driving Independent  Prior Services: None  Housing / Facility: 1 Story House  Occupation (Pre-Hospital Vocational): Employed Part Time  Leisure Interests: Hobbies  Current Level of Function:   Lower Body Dressing: Contact Guard Assist  Toileting: Contact Guard Assist    CURRENT LEVEL OF FUNCTION:   Same as level of function prior to admission to [facility]    Physical Examination:     VITAL SIGNS:   height is 1.854 m (6' 1\") and weight is 80.7 kg (178 lb). His oral temperature is 36.6 °C (97.8 °F). His blood pressure is 138/84 and his pulse is 78. His respiration is 20 and oxygen saturation is 94%.    GENERAL: No apparent distress  HEENT: Normocephalic/atraumatic and EOMI  CARDIAC: Regular rate and rhythm, normal S1, S2   LUNGS: Clear to auscultation   ABDOMINAL: bowel sounds present and soft    EXTREMITIES: no contractures, spasticity, or edema.   NEURO:  Mental status: answers questions appropriately follows commands  Speech: fluent, no aphasia or dysarthria  CRANIAL NERVES: face symmetric    Motor:    Shoulder flexors:  Right -  4/5, Left -  5/5  Elbow flexors:  Right -  5/5, Left -  5/5  Wrist extensors:  Right -  4/5, Left -  5/5  Elbow extensors:  Right -  5/5, Left -  5/5  Finger flexors:  Right -  5/5, Left -  5/5  Finger abductors:  Right -  4/5, Left -  5/5  Hip flexors:  Right -  4/5, Left -  5/5  Knee ext:  Right -  4/5, Left -  5/5  Dorsiflexors:  Right -  4/5, Left -  5/5  EHL:  Right -  4/5, Left -  5/5  Plantar flexors:  Right -  5/5, Left -  5/5  Sensory: intact to light touch through out  DTRs: 2+ in bilateral biceps and patellar tendons      Radiology:       Results for orders placed during the hospital encounter of " 02/02/24    MR-BRAIN-W/O    Impression  1.  Acute left thalamic infarct. No evidence of hemorrhagic consideration.  2.  Right parieto-occipital region encephalomalacia with associated superficial siderosis.  3.  Small area of encephalomalacia at the left temporal lobe.  4.  Atrophy.    No significant abnormality of the thoracic spine. There is no abnormal cord signal or abnormal enhancement.                                      Laboratory Values:  Recent Labs     02/02/24  1603 02/03/24  0124 02/04/24  0804   SODIUM 135 134* 137   POTASSIUM 4.0 3.9 3.9   CHLORIDE 97 101 104   CO2 24 22 22   GLUCOSE 148* 149* 118*   BUN 20 22 22   CREATININE 1.26 1.25 1.05   CALCIUM 10.9* 9.8 9.0     Recent Labs     02/02/24  1603 02/03/24  0124 02/04/24  0804   WBC 9.2 7.8 9.5   RBC 6.67* 6.15* 6.14*   HEMOGLOBIN 17.8 16.2 16.2   HEMATOCRIT 54.3* 50.2 50.8   MCV 81.4 81.6 82.7   MCH 26.7* 26.3* 26.4*   MCHC 32.8 32.3 31.9*   RDW 67.7* 68.6* 69.0*   PLATELETCT 528* 419 389   MPV 9.4 9.2 10.3     Recent Labs     02/02/24  1603   APTT 40.4*   INR 1.55*       Primary Rehabilitation Diagnosis:    This patient is a 73 y.o. male admitted for acute inpatient rehabilitation with Left thalamic infarction (HCC).    Impairments:   Cognitive  ADLs/IADLs  Mobility    Secondary Diagnosis/Medical Co-morbidities Affecting Function  HTN  HLD  Hyponatremia  DM2 with hyperglycemia  BPH    Relevant Changes Since Preadmission Evaluation:    Status unchanged    The patient's rehabilitation potential is Good  The patient's medical prognosis is fair    Rehabilitation Plan:   Discussion and Recommendations:   1. The patient requires an acute inpatient rehabilitation program with a coordinated program of care at an intensity and frequency not available at a lower level of care. This recommendation is substantiated by the patient's medical physicians who recommend that the patient's intervention and assessment of medical issues needs to be done at an acute  level of care for patient's safety and maximum outcome.   2. A coordinated program of care will be supplied by an interdisciplinary team of physical therapy, occupational therapy, rehab physician, rehab nursing, and, if needed, speech therapy and rehab psychology. Rehab team presents a patient-specific rehabilitation and education program concentrating on prevention of future problems related to accessibility, mobility, skin, bowel, bladder, sexuality, and psychosocial and medical/surgical problems.   3. Need for Rehabilitation Physician: The rehab physician will be evaluating the patient on a multi-weekly basis to help coordinate the program of care. The rehab physician communicates between medical physicians, therapists, and nurses to maximize the patient's potential outcome. Specific areas in which the rehab physician will be providing daily assessment include the following:   A. Assessing the patient's heart rate and blood pressure response (vitals monitoring) to activity and making adjustments in medications or conservative measures as needed.   B. The rehab physician will be assessing the frequency at which the program can be increased to allow the patient to reach optimal functional outcome.   C. The rehab physician will also provide assessments in daily skin care, especially in light of patient's impairments in mobility.   D. The rehab physician will provide special expertise in understanding how to work with functional impairment and recommend appropriate interventions, compensatory techniques, and education that will facilitate the patient's outcome.   4. Rehab R.N.   The rehab RN will be working with patient to carry over in room mobility and activities of daily living when the patient is not in 3 hours of skilled therapy. Rehab nursing will be working in conjunction with rehab physician to address all the medical issues above and continue to assess laboratory work and discuss abnormalities with the  treating physicians, assess vitals, and response to activity, and discuss and report abnormalities with the rehab physician. Rehab RN will also continue daily skin care, supervise bladder/bowel program, instruct in medication administration, and ensure patient safety.   5. Rehab Therapy: Therapies to treat at intensity and frequency of (may change after completion of evaluation by all therapeutic disciplines):       PT:  Physical therapy to address mobility, transfer, gait training and evaluation for adaptive equipment needs 1 hour/day at least 5 days/week for the duration of the ELOS (see below)       OT:  Occupational therapy to address ADLs, self-care, home management training, functional mobility/transfers and assistive device evaluation, and community re-integration 1  hour/day at least 5 days/week for the duration of the ELOS (see below).        ST/Dysphagia:  Speech therapy to address speech, language, and cognitive deficits as well as swallowing difficulties with retraining/dysphagia management and community re-integration with comprehension, expression, cognitive training 1  hour/day at least 5 days/week for the duration of the ELOS (see below).     Medical management / Rehabilitation Issues/ Adverse Potential as part of rehabilitation plan     Rehabilitation Issues/Adverse Potential  1.  CVA (Cerebrovascular Accident): Cont ASA and Eliquis for secondary prophylaxis as well as lipid and blood pressure management. Patient demonstrates functional deficits in strength, balance, coordination, and ADL's. Patient is admitted to [facility] for comprehensive rehabilitation therapy as described below.   Rehabilitation nursing monitors bowel and bladder control, educates on medication administration, co-morbidities and monitors patient safety.    2.  Neurostimulants: None at this time but continue to assess daily for need to initiate should status change.    3.  DVT prophylaxis:  Patient is on  Eliquis for anticoagulation upon transfer. Encourage OOB. Monitor daily for signs and symptoms of DVT including but not limited to swelling and pain to prevent the development of DVT that may interfere with therapies.    4.  GI prophylaxis:  On prilosec to prevent gastritis/dyspepsia which may interfere with therapies.    5.  Pain: No issues with pain currently / Controlled with APAP/Oxycodone    6.  Nutrition/Dysphagia: Dietician monitors nutrient intake, recommend supplements prn and provide nutrition education to pt/family to promote optimal nutrition for wound healing/recovery.     7.  Bladder/bowel:  Start bowel and bladder program as described below, to prevent constipation, urinary retention (which may lead to UTI), and urinary incontinence (which will impact upon pt's functional independence).   - Post void bladder scans, I&O cath for PVRs >400  - up to commode after meal     8.  Skin/dermal ulcer prophylaxis: Monitor for new skin conditions with q.2 h. turns as required to prevent the development of skin breakdown.     9.  Cognition/Behavior: As needed psychologist provides adjustment counseling to illness and psychosocial barriers that may be potential barriers to rehabilitation.     10. Respiratory therapy: RT performs O2 management prn, breathing retraining, pulmonary hygiene and bronchospasm management prn to optimize participation in therapies.     Medical Co-Morbidities/Adverse Potential Affecting Function:  L CVA - 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 He has been started on ASA and Eliquis  -PT and OT for mobility and ADLs. Per guidelines, 15 hours per week between PT, OT and/or SLP.  -Follow-up Neurology    HTN/A Fib/sCHF - Patient on Eliquis. Patient on Amlodipine 5 mg daily.  History of low SBP at times.    HLD - Patient allergic to statins.     Hyponatremia - Check AM CMP    DM2 with hyperglycemia - Patient on Lantus 10 daily and SSI    BPH - Patient on Finasteride 5 mg and Flomax  0.4 mg    Pain - Patient on PRN APAP and Oxycodone    Skin - Patient at risk for skin breakdown due to debility in areas including sacrum, achilles, elbows and head in addition to other sites. Nursing to assess skin daily.     GI Ppx - Patient on Prilosec for GERD prophylaxis. Patient on Senna-docusate for constipation prophylaxis.   Last BM: 02/05/24 (per patient)    DVT Ppx - Patient Eliquis on transfer.    I personally performed a complete drug regimen review and no potential clinically significant medication issues were identified.     Goals/Expected Level of Function Based on Current Medical and Functional Status:  (may change based on patient's medical status and rate of impairment recovery)  Transfers:   Modified Independent  Mobility/Gait:   Modified Independent  ADL's:   Modified Independent  Cognition:  supervs    DISPOSITION: Discharge to pre-morbid independent living setting with the supportive care of patient's family.    ELOS: 10-17 days  ____________________________________    T. Edouard Starks MD/PhD  [facility] - Physical Medicine & Rehabilitation   [facility] - Brain Injury Medicine   ____________________________________    Pt was seen today for 75 min, and entire time spent in face-to-face contact was >50% in counseling and coordination of care as detailed in A/P above.

## 2024-02-05 NOTE — PROGRESS NOTES
Monitor Summary:    Rhythm:  SR/ST, BBB   Rate Range:  62-76  Ectopy: R/oPVC, rPAC/COUP    Measurements:  0.20/0.12/0.46  (Measured by monitor tech)

## 2024-02-05 NOTE — THERAPY
"Occupational Therapy  Daily Treatment     Patient Name: Francesco Schulte  Age:  73 y.o., Sex:  male  Medical Record #: [id number]  Today's Date: 2/5/2024     Precautions  Precautions: Fall Risk  Comments: high    Assessment    OT tx emphasis on ADL's and transfers - see notes below for detaikls.   Barriers include impaired safety awareness/judgement, impaired balance.  Therapist reviewed environmental/safety awareness, fall precautions, recommended use of FWW vs SPC, ADL's and transfers.    Plan    Treatment Plan Status: (P) Continue Current Treatment Plan  Type of Treatment: Self Care / Activities of Daily Living, Neuro Re-Education / Balance, Therapeutic Activity  Treatment Frequency: 3 Times per Week  Treatment Duration: Until Therapy Goals Met    DC Equipment Recommendations: Unable to determine at this time  Discharge Recommendations: (P) Recommend post-acute placement for additional occupational therapy services prior to discharge home    Subjective  \"I won't use a walker. My cane is just fine\"  Pt was alert and cooperative w/ tx.     Objective       02/05/24 0906    Services   Is patient using  services for this encounter? No   Precautions   Precautions Fall Risk   Pain   Intervention Declines   Cognition    Level of Consciousness Alert   Safety Awareness Impaired   Comments Impaired judgement/safety awareness.  Therapist recommended use of FWW secondary impaired balance and safety - pt refused FWW   Balance   Sitting Balance (Static) Fair   Sitting Balance (Dynamic) Fair -   Standing Balance (Static) Fair -   Standing Balance (Dynamic) Poor +   Weight Shift Sitting Fair   Weight Shift Standing Fair   Skilled Intervention Postural Facilitation;Verbal Cuing;Tactile Cuing   Comments SPC OOB (Pt refused FWW)   Bed Mobility    Supine to Sit Supervised   Sit to Supine Supervised   Activities of Daily Living   Grooming Standby Assist;Standing   Upper Body Dressing Modified Independent "   Lower Body Dressing Minimal Assist   Toileting Contact Guard Assist   How much help from another person does the patient currently need...   Putting on and taking off regular lower body clothing? 3   Bathing (including washing, rinsing, and drying)? 3   Toileting, which includes using a toilet, bedpan, or urinal? 3   Putting on and taking off regular upper body clothing? 4   Taking care of personal grooming such as brushing teeth? 3   Eating meals? 4   6 Clicks Daily Activity Score 20   Functional Mobility   Sit to Stand Contact Guard Assist   Bed, Chair, Wheelchair Transfer Contact Guard Assist   Toilet Transfers Contact Guard Assist   Transfer Method Stand Step   Mobility CGA SPC   Distance (Feet) 25   # of Times Distance was Traveled 2   Short Term Goals   Short Term Goal # 1 ADL transfers with Spv within 5 days   Goal Outcome # 1 Progressing as expected   Short Term Goal # 2 FB dressing with Spv within 5 days   Goal Outcome # 2 Progressing as expected   Short Term Goal # 3 Toileting in br with Spv within 5 days   Goal Outcome # 3 Progressing as expected   Occupational Therapy Treatment Plan    O.T. Treatment Plan Continue Current Treatment Plan   Anticipated Discharge Equipment and Recommendations   Discharge Recommendations Recommend post-acute placement for additional occupational therapy services prior to discharge home

## 2024-02-05 NOTE — CARE PLAN
Problem: Optimal Care of the Stroke Patient  Goal: Optimal emergency care for the stroke patient  Outcome: Progressing     Problem: Knowledge Deficit - Stroke Education  Goal: Patient's knowledge of stroke and risk factors will improve  Outcome: Progressing     Problem: Discharge Planning - Stroke  Goal: Patient’s continuum of care needs will be met  Outcome: Progressing   The patient is Stable - Low risk of patient condition declining or worsening    Shift Goals  Clinical Goals: discharge to rehab, prevent falls, q4 neuro  Patient Goals: get stronger, go home  Family Goals: KAL    Progress made toward(s) clinical / shift goals:  updated pt on plan of care, reevaluation by PT and OT. [facility]ticipate [facility]. Pts neurological status is unchanged at this time.     Patient is not progressing towards the following goals:

## 2024-02-05 NOTE — PREADMISSION SCREENING NOTE
Pre-Admission Screening Form    Patient Information:   Name: Francesco Schulte     MRN: [id number]       : 1950      Age: 73 y.o.   Gender: male      Race: White [7]       Marital Status:  [2]  Family Contact: OmalleyArethamanAlysa        Relationship: Daughter [2]  Spouse [17]  Home Phone: [phone]           Cell Phone: [phone]  Advanced Directives: Copy in Chart  Code Status:  FULL  Current Attending Provider: Montana Copeland M.D.  Referring Physician: Dr. Pedraza      Physiatrist Consult: Dr. Starks        Referral Date: 2024  Primary Payor Source:  MEDICARE  Secondary Payor Source:  AET[employer] - MISCELLANEOUS    Medical Information:   Date of Admission to Acute Care [place]  Room Number: 3314/02  Rehabilitation Diagnosis: 0001.2 - Stroke: Right Body Involvement (Left Brain)  Immunization History   Administered Date(s) Administered    INFLUENZA TIV (IM) 08/15/2019    Influenza (IM) Preservative Free - HISTORICAL DATA 08/15/2019    Influenza Seasonal Injectable - Historical Data 10/26/2015, 10/07/2016    Influenza Vaccine Adult HD 10/20/2017    Influenza Vaccine Quad Inj (Pf) 10/09/2013, 10/01/2014    Influenza Vaccine Quad Inj (Preserved) 10/16/2016    Influenza, Unspecified - HISTORICAL DATA 2018    Pneumococcal Conjugate Vaccine (Prevnar/PCV-13) 2015, 10/07/2016, 2018    Pneumococcal polysaccharide vaccine (PPSV-23) 10/16/2013    Tdap Vaccine 10/16/2013    Zoster Vaccine Recombinant (RZV) (SHINGRIX) 2022, 2023     Allergies   Allergen Reactions    Atorvastatin      States he  when he took it and required life saving measures    Doxycycline      Swelling lips and difficulties swallowing    Lisinopril Anaphylaxis     Pt coded    Simvastatin      States he  while taking a statin and required life saving measures    Statins [Hmg-Coa-R Inhibitors]      States he  while taking a statin and  "required life saving measures    Beta Adrenergic Blockers Unspecified     dizziness    Eplerenone Unspecified     Intolerance, dehydration, altered mental status    Metformin Palpitations and Unspecified     Cardiac effects  \"Similar to a heart attack, I was hospitalized\"    Spironolactone Palpitations     Past Medical History:   Diagnosis Date    Acute bacterial endocarditis 11/02/2021    Agent orange exposure     Anesthesia     resistant to pain meds and \"numbing medications\"    Cancer (Formerly [facility])     polycythemia vera    Catheter-associated urinary tract infection ([facility]) 08/09/2022    This is a new problem, patient has had urinary catheter inserted in emergency room 4 weeks ago due to urinary retention.  He has been experiencing subjective fever, right flank pain.  He tells me he has experienced similar symptoms are when he was diagnosed with urinary tract infection last time.          Diabetes ([facility]ly [facility])     insulin and oral meds    Family history of punctured lung 2002    left lung    Heart attack ([facility]) 03/2017    Followed by [facility]    Hemorrhagic disorder ([facility]ly [facility])     on blood thinners    High cholesterol     Ischemic cardiomyopathy     Kidney stones     hx of kidney stones    Lupus ([facility]) 11/15/2019    Orthostatic hypotension 03/29/2018    Pain     headache pain    Polycythemia vera(238.4)     Stroke ([facility]) 2016    r/t afib; eye issues resolved afterward    Urinary bladder disorder     enlarged prostate, weak stream, difficulty urinating    Vertigo      Past Surgical History:   Procedure Laterality Date    MN DX BONE MARROW BIOPSIES Left 11/8/2023    Procedure: BIOPSY, BONE MARROW, USING NEEDLE OR TROCAR;  Surgeon: Ingrid Zambrano M.D.;  Location: SURGERY SAME DAY [facility];  Service: Orthopedics    BONE ASPIRATION BIOPSY Left 11/8/2023    Procedure: BONE MARROW BIOPSY AND ASPIRATION - DR. ANDERSON;  Surgeon: Ingrid Zambrano M.D.;  Location: SURGERY SAME DAY [facility];  Service: Orthopedics    PAMELA N/A 10/30/2021    Procedure: " ECHOCARDIOGRAM, TRANSESOPHAGEAL;  Surgeon: Beau Gutierrez M.D.;  Location: [facility];  Service: Cardiac    AICD IMPLANT  07/29/2021    [employer] [id number] implanted by Dr. Isaac.    SPLIT THICKNESS SKIN GRAFT N/A 8/23/2020    Procedure: APPLICATION, GRAFT, SKIN, SPLIT-THICKNESS;  Surgeon: Elisa Craig M.D.;  Location: [facility];  Service: Plastics    SKIN ABSCESS INCISION AND DRAINAGE Right 8/3/2020    Procedure: INCISION AND DRAINAGE - SCROTAL WOUND - WOUND VAC PLACEMENT;  Surgeon: Jaylen Hayes M.D.;  Location: [facility];  Service: Urology    MD EXPLORE SCROTUM Right 7/31/2020    Procedure: EXPLORATION, SCROTUM - FOR WASH OUT OF SCROTAL WOUND & WOUND VAC PLACEMENT;  Surgeon: Ferny Rosales M.D.;  Location: [facility];  Service: Urology    MD EXPLORE SCROTUM Right 7/29/2020    Procedure: EXPLORATION, SCROTUM - FOR I & D OF SCROTAL AND  GROIN WOUND;  Surgeon: Donta Viera M.D.;  Location: [facility];  Service: Urology    MD INCIS/DRAIN SCROTUM/TESTIS,EPIDIDYM Right 7/25/2020    Procedure: INCISION AND DRAINAGE, SCROTUM;  Surgeon: Nick Negro M.D.;  Location: [facility];  Service: Urology    TEMPORAL ARTERY BIOPSY Right 6/26/2018    Procedure: TEMPORAL ARTERY BIOPSY;  Surgeon: Rajendra Aguilar M.D.;  Location: SURGERY SAME DAY [facility];  Service: General    CAROTID ENDARTERECTOMY Right 3/21/2018    Procedure: CAROTID ENDARTERECTOMY- W/EEG MONITORING;  Surgeon: Benny Morfin M.D.;  Location: [facility];  Service: General    APPENDECTOMY      CATH PLACEMENT      right arm    LAMINOTOMY      diskectomy; C4    OTHER CARDIAC SURGERY      stents x 3       History Leading to Admission, Conditions that Caused the Need for Rehab (CMS):     Mimi Pedraza M.D.  Physician  [facility]     H&P     Signed     Date of Service: 2/2/2024  6:23 PM      Hospital Medicine History & Physical Note    "  Date of Service  2/2/2024     Primary Care Physician  Tala Dunne M.D.      Consultants  None       Code Status  Full Code     Chief Complaint    Chief Complaint  Patient presents with   Possible Stroke      Pt. States he drove himself here. Repeatedly stating \"I'm having a major stroke. This has happened before\". Pt. Uncooperative during triage exam, stating he is feeling that he will pass out. Not answering most questions asked. Pt. Does identify R sided weakness, but non-specific about what parts on R side are weak. Pt. Also not giving time frame of symptom start despite repeatedly asking pt. For this. Pt. Also c/o fast HR.    Rapid Heart Beat   Justification for Admission Status  I anticipate this patient will require at least two midnights for appropriate medical management, necessitating inpatient admission because patient has worsening transit right upper and lower extremity weakness concerning for possible stroke versus TIA he will require ongoing monitoring of his weakness with MRI of the brain to further evaluate the possibility of stroke.  He will also need speech, physical and Occupational Therapy evaluation.     Patient will need a Telemetry bed on MEDICAL service.  The patient has transient weakness of the right upper and lower extremities.     * Acute on chronic right upper and lower extremity weakness (HCC)- (present on admission)  Assessment & Plan  Admit to Neuro, with continuous cardiac monitoring  CT, CT-angiography, head, neck showed no acute infarction, or hemorrhage    I will check MRI brain to rule out acute infarction and other potential causes  Aspiration, Fall, and seizure precautions    Neuro checks   I will start aspirin.  The patient does not tolerate statins.  Permissive hypertension until MRI of the brain is done and rules out a stroke.  Speech, physical, occupational therapy evaluation ordered  I will place referral to physiatry      Chest pain- (present on " admission)  Assessment & Plan  EKG shows, sinus tachycardia with a rate of 99, there is left bundle branch block with expected abnormal repolarization wave.  This is seen on prior EKGs.  QTc is 492.   Initial troponin is is elevated at 21, I will trend  Troponin elevation likely secondary to ischemic heart disease and reduced clearance due to chronic kidney disease  I ordered Stat EKG and troponin for recurrence of chest pain.   I will place on continuous cardiac monitoring.  I will start aspirin.  The patient does not tolerate statins  Consider stress test in the morning [not ordered] according to clinical course/troponin trend      ACP (advance care planning)- (present on admission)  Assessment & Plan  I had a discussion with the patient and family present at bedside in the emergency room regarding goals of care, diagnoses, prognosis, and CODE STATUS. We discussed his her prognosis and comorbidities.  The patient has advanced age and multiple chronic medical problems including diabetes, agent orange exposure, atrial fibrillation, history of strokes, advanced chronic systolic heart failure and presenting with acute on chronic transient right-sided weakness concerning for stroke/TIA.  Overall patient has poor functional status.  He also has a poor prognosis.  Despite that patient/family wants to maintain a full code.  They are open to all forms of invasive or noninvasive diagnostic and therapeutic interventions.       History of cerebrovascular accident- (present on admission)  Assessment & Plan  The patient has a history of multiple cerebrovascular accidents, and residual right-sided weakness  He is presenting with transient right-sided weakness  He has atrial fibrillation with markedly elevated [id number] score  CT, CT-angiography, head, neck showed no acute infarction or hemorrhage  I will check MRI brain to rule out acute infarction and other potential causes  Aspiration, Fall, and seizure precautions    Neuro  checks   Permissive hypertension until MRI of the brain is done and rules out a stroke.  Speech, physical, occupational therapy evaluation ordered  I will place referral to physiatry      Chronic systolic congestive heart failure (HCC)- (present on admission)  Assessment & Plan  Not currently in exacerbation.  Monitor volume status closely  I reviewed recent echo done a week ago shows a significantly reduced ejection fraction of 20%.     Polycythemia vera (HCC)- (present on admission)  Assessment & Plan  Following with oncology, Dr. Sims     Essential hypertension- (present on admission)  Assessment & Plan  Not currently on antihypertensive medications.  Avoid as needed antihypertensives until stroke is ruled out     Type 2 diabetes mellitus with hyperglycemia (HCC)- (present on admission)  Assessment & Plan  With no significant hyperglycemia  Last glycated hemoglobin was 6%  I will start short acting insulin for now  I will order Accu-Checks, hypoglycemia protocol  Adjust according to blood sugars trend.      Paroxysmal A-fib (HCC)- (present on admission)  Assessment & Plan  Resume anticoagulation with apixaban  Not currently on blocking agents     Dyslipidemia- (present on admission)  Assessment & Plan  I will start her on a cardiac diet  The patient does not tolerate statins        VTE prophylaxis: SCDs/TEDs and therapeutic anticoagulation with apixaban     I had a discussion with the patient and family present at bedside in the emergency room regarding goals of care, diagnoses, prognosis, and CODE STATUS. We discussed his her prognosis and comorbidities.  The patient has advanced age and multiple chronic medical problems including diabetes, agent orange exposure, atrial fibrillation, history of strokes, advanced chronic systolic heart failure and presenting with acute on chronic transient right-sided weakness concerning for stroke/TIA.  Overall patient has poor functional status.  He also has a poor prognosis.   Despite that patient/family wants to maintain a full code.  They are open to all forms of invasive or noninvasive diagnostic and therapeutic interventions. I spent 18 minutes on advanced care planning in addition to the time spent managing the other medical problems.     Janine Silver M.D.  Physician  Cardiology     Consults     Signed     Date of Service: 2/3/2024  9:03 AM      This is a written report of interprofessional telephone / internet / EHR assessment and management service provided by me Dr. David Silver (as cardiology specialist), to patient's requesting physician or other qualified health care professional. Total time spent of medical consultative time is: 5 minutes.     Consulting requested by primary provider: Dr. Copeland.     Reason for consult: abnormal EKG, elevated troponin.     We discussed and I personally interpreted, reviewed all EKG tracings, clinical presentation, imaging studies, blood test results to the the extent of availability.     No ST elevation seen on EKG.  HF exacerbation and HTN emergency.  No indication for invasive intervention.  Consider end of life discussion. Hospice care seems reasonable.     At this time it was deemed no formal in person cardiology consultation was necessary, however if this changes due to changes in patient condition or abnormal test results, please re-consult cardiology.     Electronically Signed by:     Janine Silver M.D.     2/3/2024     9:03 AM                           Co-morbidities:  as listed above and below   Potential Risk - Complications: Contractures, Deep Vein Thrombosis, Perceptual Impairment, and falls   Level of Risk: High    Ongoing Medical Management Needed (Medical/Nursing Needs):   Patient Active Problem List    Diagnosis Date Noted    Left thalamic infarction (HCC) 02/05/2024    Left Ischemic stroke (HCC) 02/04/2024    Hypertensive emergency 02/03/2024    Pulmonary hypertension (HCC) 02/03/2024    Acute on chronic right upper and  lower extremity weakness (HCC) 02/02/2024    Chronic systolic congestive heart failure (HCC) 02/02/2024    History of cerebrovascular accident 02/02/2024    ACP (advance care planning) 02/02/2024    Onychomycosis 12/19/2023    Hospital discharge follow-up 04/11/2023    Cyanosis of tip of finger 04/11/2023    Gastroenteritis 04/04/2023    Dehydration 04/04/2023    Post herpetic neuralgia 08/20/2022    Ileus ([facility]) 08/19/2022    Herpes zoster 07/26/2022    Medically noncompliant 07/06/2022    Pain with urination 06/30/2022    Acute non-recurrent frontal sinusitis 06/03/2022    Urinary tract infection associated with indwelling urethral catheter ([facility]) 05/31/2022    Ischemic cardiomyopathy 10/24/2021    Cholelithiasis 05/31/2021    Drug-induced neutropenia ([facility]) 11/05/2020    Insomnia due to medical condition 10/26/2020    QT prolongation 09/30/2020    Nephrolithiasis 08/30/2020    History of TIA (transient ischemic attack) 08/20/2020    Constipation 08/15/2020    SLE (systemic lupus erythematosus related syndrome) ([facility]) 07/25/2020    Pancreatic cyst 07/25/2020    Generalized pain 07/01/2020    Encounter for long-term (current) use of insulin ([facility]) 05/06/2019    Chest pain 01/05/2019    Polycythemia vera ([facility]) 01/03/2019    Multiple joint pain 10/03/2018    Vertigo 08/09/2018    Elevated sed rate- R/O PMR 04/04/2018    Orthostatic hypotension 03/29/2018    Stenosis of right carotid artery-Right CEA 3/21/18 03/21/2018    Essential hypertension 03/21/2018    Hypercoagulable state ([facility]) 03/21/2018    History of stroke- old right parietal/occipital 03/10/2018    Leukopenia 03/10/2018    Carotid stenosis, 90% right carotid 03/10/2018    Chronic pain syndrome 09/13/2017    Long term (current) use of anticoagulants [Z79.01] 08/21/2017    History of ST elevation myocardial infarction (STEMI) 03/17/2017    Coagulopathy ([facility]) 03/13/2017    Type 2 diabetes mellitus with hyperglycemia ([facility]) 03/13/2017    Coronary artery disease  "involving native coronary artery without angina pectoris 03/13/2017    Benign prostatic hyperplasia with urinary retention 03/12/2017    Thrombocytosis 03/12/2017    Dyslipidemia 03/12/2017    Paroxysmal A-fib ([facility]) 03/12/2017    Statin intolerance 03/12/2017    STEMI (ST elevation myocardial infarction) ([facility]) 03/11/2017       Current Vital Signs:   Temperature: 36.4 °C (97.5 °F) Pulse: 76 Respiration: 17 Blood Pressure : (!) 146/83  Weight: 88.8 kg (195 lb 12.3 oz) Height: 188 cm (6' 2\")  Pulse Oximetry: 94 % O2 (LPM): 0      Completed Laboratory Reports:  Recent Labs     02/02/24  1603 02/03/24  0124 02/04/24  0804   WBC 9.2 7.8 9.5   HEMOGLOBIN 17.8 16.2 16.2   HEMATOCRIT 54.3* 50.2 50.8   PLATELETCT 528* 419 389   SODIUM 135 134* 137   POTASSIUM 4.0 3.9 3.9   BUN 20 22 22   CREATININE 1.26 1.25 1.05   ALBUMIN 4.0 3.3 3.4   GLUCOSE 148* 149* 118*   INR 1.55*  --   --      MRI of the brain without contrast.     T1 sagittal, T2 fast spin-echo axial, T1 coronal, FLAIR coronal, diffusion-weighted and apparent diffusion coefficient (ADC map) axial images were obtained of the whole brain.     The study was performed on a [employer] 1.5 Antonina MRI scanner.     COMPARISON:  CT head 2/2/2024. MR brain 10/26/2021     FINDINGS:  Motion degraded exam.     Small area of restricted diffusion in the left thalamus with associated FLAIR signal abnormality.     Right parieto-occipital region encephalomalacia. There is some associated superficial siderosis. Small area of encephalomalacia of the left temporal lobe.     The calvariae are unremarkable. There are no extra-axial fluid collections. Parenchymal atrophy with associated sulcal and ventricular prominence. There are no mass effects or shift of midline structures. There are no hemorrhagic lesions.     The brainstem and posterior fossa structures are unremarkable.     Vascular flow voids in the vertebrobasilar and carotid arteries, [place], and dural venous sinuses are " grossly intact , though obscured by motion artifact.     The paranasal sinuses and mastoids in the field of view are unremarkable.     IMPRESSION:     1.  Acute left thalamic infarct. No evidence of hemorrhagic consideration.  2.  Right parieto-occipital region encephalomalacia with associated superficial siderosis.  3.  Small area of encephalomalacia at the left temporal lobe.  4.  Atrophy.           Exam Ended: 24 10:53 Last Resulted: 24 12:56                   Additional Labs: Not Applicable    Prior Living Situation:   Housing / Facility: 1 Story House  Steps Into Home: 0  Steps In Home: 0  Lives with - Patient's Self Care Capacity: Spouse  Equipment Owned: Single Point Cane, Front-Wheel Walker, Tub / Shower Seat    Prior Level of Function / Living Situation:   Physical Therapy: Prior Services: None  Housing / Facility: 1 Story House  Steps Into Home: 0  Steps In Home: 0  Bathroom Set up: Walk In Shower, Shower Chair  Equipment Owned: Single Point Cane, Front-Wheel Walker, Tub / Shower Seat  Lives with - Patient's Self Care Capacity: Spouse  Bed Mobility: Independent  Transfer Status: Independent  Ambulation: Independent  Assistive Devices Used: Single Point Cane  Current Level of Function:   Gait Level Of Assist: Minimal Assist  Assistive Device: Single Point Cane  Distance (Feet): 150  Deviation: Ataxic, Increased Base Of Support  # of Stairs Climbed: 0  Weight Bearing Status: full  Supine to Sit: Supervised  Sit to Supine: Supervised  Scooting: Independent  Sit to Stand: Contact Guard Assist  Bed, Chair, Wheelchair Transfer: Contact Guard Assist  Toilet Transfers: Contact Guard Assist  Transfer Method: Stand Step  Sitting in Chair: 10  Sitting Edge of Bed: 12  Standin-8 min  Occupational Therapy:   Self Feeding: Independent  Grooming / Hygiene: Independent  Bathing: Independent  Dressing: Independent  Toileting: Independent  Medication Management: Independent  Laundry: Independent  Kitchen  Mobility: Independent  Finances: Independent  Home Management: Independent  Shopping: Independent  Prior Level Of Mobility: Independent With Device in Home  Driving / Transportation: Driving Independent  Prior Services: None  Housing / Facility: 1 Story House  Occupation (Pre-Hospital Vocational): Employed Part Time  Leisure Interests: Hobbies  Current Level of Function:   Lower Body Dressing: Contact Guard Assist  Toileting: Contact Guard Assist  Speech Language Pathology:      Rehabilitation Prognosis/Potential: Good  Estimated Length of Stay: 10-14 days    Nursing:      Continent    Scope/Intensity of Services Recommended:  Physical Therapy: 1 hr / day  5 days / week. Therapeutic Interventions Required: Maximize Endurance, Mobility, Strength, and Safety  Occupational Therapy: 1 hr / day 5 days / week. Therapeutic Interventions Required: Maximize Self Care, ADLs, IADLs, and Energy Conservation  Speech & Language Pathology: evaluate  5 days / week. Therapeutic Interventions Required: Maximize Cognition, Safety, and if no slp required 1 hour back to PT/OT   Rehabilitation Nursin/7. Therapeutic Interventions Required: Monitor Skin, Vital Signs, Intake and Output, Labs, Safety, and Family Training  Rehabilitation Physician: 3 - 5 days / week. Therapeutic Interventions Required: Medical Management  Respiratory Care: evaluate . Therapeutic Interventions Required: Pulmonary Toileting  Dietician: consult . Therapeutic Interventions Required: nutritional need    He requires 24-hour rehabilitation nursing to manage bowel and bladder function, skin care, nutrition and fluid intake, pulmonary hygiene, pain control, safety, medication management, and patient/family goals. In addition, rehabilitation nursing will reiterate and reinforce therapy skills and equipment use, including ADLs, as well as provide education to the patient and family. Francesco Schulte is willing to participate in and is able to tolerate the  proposed plan of care.    Rehabilitation Goals and Plan (Expected frequency & duration of treatment in the IRF):   Return to the Community, Modified Independent Level of Care, and Outpatient Support  Anticipated Date of Rehabilitation Admission: 02/05/2024  Patient/Family oriented IRF level of care/facility/plan: Yes  Patient/Family willing to participate in IRF care/facility/plan: Yes  Patient able to tolerate IRF level of care proposed: Yes  Patient has potential to benefit IRF level of care proposed: Yes  Comments: Not Applicable    Special Needs or Precautions - Medical Necessity:  Safety Concerns/Precautions:  Fall Risk / High Risk for Falls and Balance  Cardiac Precautions  Current Medications:    Current Facility-Administered Medications Ordered in Epic   Medication Dose Route Frequency Provider Last Rate Last Admin    [START ON 2/6/2024] amLODIPine (Norvasc) tablet 5 mg  5 mg Oral Q DAY Montana Copeland M.D.        ondansetron (Zofran) syringe/vial injection 4 mg  4 mg Intravenous Q4HRS PRN Montana Copeland M.D.   4 mg at 02/04/24 0817    nitroglycerin (Nitrostat) tablet 0.4 mg  0.4 mg Sublingual Q5 MIN PRN Neptali David D.O.        Pharmacy Consult Request ...Pain Management Review 1 Each  1 Each Other PHARMACY TO DOSE Montana Copeland M.D.        oxyCODONE immediate-release (Roxicodone) tablet 2.5 mg  2.5 mg Oral Q3HRS PRN Montana Copeland M.D.   2.5 mg at 02/03/24 2014    Or    oxyCODONE immediate-release (Roxicodone) tablet 5 mg  5 mg Oral Q3HRS PRN Montana Copeland M.D.   5 mg at 02/04/24 1137    Or    HYDROmorphone (Dilaudid) injection 0.25 mg  0.25 mg Intravenous Q3HRS PRN Montana Copeland M.D.        omeprazole (PriLOSEC) capsule 20 mg  20 mg Oral DAILY Montana Copeland M.D.   20 mg at 02/05/24 0530    ondansetron (Zofran ODT) dispertab 4 mg  4 mg Oral Q4HRS PRN Mimi Pedraza M.D.   4 mg at 02/03/24 1908    acetaminophen (Tylenol) tablet 650 mg  650 mg Oral Q6HRS PRN Asem A  JANES Pedraza   650 mg at 02/03/24 0851    senna-docusate (Pericolace Or Senokot S) 8.6-50 MG per tablet 2 Tablet  2 Tablet Oral BID Mimi Pedraza M.D.   2 Tablet at 02/04/24 1706    And    polyethylene glycol/lytes (Miralax) Packet 1 Packet  1 Packet Oral QDAY PRN Mimi Pedraza M.D.        insulin regular (HumuLIN R,NovoLIN R) injection  1-6 Units Subcutaneous 4X/DAY ACHS Mimi Pedraza M.D.   1 Units at 02/03/24 0551    And    dextrose 50% (D50W) injection 25 g  25 g Intravenous Q15 MIN PRN Mimi Pedraza M.D.        apixaban (Eliquis) tablet 5 mg  5 mg Oral BID Mimi Pedraza M.D.   5 mg at 02/05/24 0530    dicyclomine (Bentyl) tablet 10 mg  10 mg Oral Q6HRS PRN Mimi Pedraza M.D.   10 mg at 02/03/24 0238    finasteride (Proscar) tablet 5 mg  5 mg Oral QDAY Mimi Pedraza M.D.   5 mg at 02/05/24 0530    tamsulosin (Flomax) capsule 0.4 mg  0.4 mg Oral DAILY Aseburak Pedraza M.D.   0.4 mg at 02/05/24 0530    aspirin EC tablet 81 mg  81 mg Oral Q EVENING Aseburak Pedraza M.D.   81 mg at 02/04/24 1707    insulin GLARGINE (Lantus,Semglee) injection  10 Units Subcutaneous QAM INSULIN Mimi Pedraza M.D.   10 Units at 02/05/24 0535     Current Outpatient Medications Ordered in Epic   Medication Sig Dispense Refill    [START ON 2/6/2024] amLODIPine (NORVASC) 5 MG Tab Take 1 Tablet by mouth every day.       Diet:   DIET ORDERS (From admission to next 24h)       Start     Ordered    02/02/24 1831  Diet Order Diet: Cardiac; Second Modifier: (optional): Consistent CHO (Diabetic)  ALL MEALS        Question Answer Comment   Diet: Cardiac    Second Modifier: (optional) Consistent CHO (Diabetic)        02/02/24 1832                    Anticipated Discharge Destination / Patient/Family Goal:  Destination: Home with Assistance Support System: Spouse and after work   Anticipated home health services: OT and PT  Previously used  service/ provider: Not Applicable  Anticipated DME Needs:  SPC  Outpatient  Services: OT and PT  Alternative resources to address additional identified needs:   Future Appointments   Date Time Provider Department Center   2/7/2024  8:40 AM MAURICIO Maria None   Jonh Griggs  Clinical Admissions Coordinator     Discharge Planning     Signed     Date of Service: 2/5/2024 10:47 AM      Following for post acute services. Spoke with Bob about goals and expectation for IRF level of care. Discussed therapy plan for 3 hours per day 5 days a week. Discussed therapy schedules 30/45 or 60 minute sessions typically 1.5 hours between breakfast and lunch and another 1.5 hours between lunch and dinner. Discussed PT/OT and SLP roles. Discussed potential length of stay. Discussed comprehensive medical surveillance by physiatry as well as hospitalist team. Discussed patient to nursing ratio. Discussed insurance Medicare  coverage for the program. Discussed visitation and clothing requirements. Alysa will be primary support for Bob  to return to the community. Discussed participation with therapy program when visiting.  Family/ patient rehab goals are  to achieve MOD I  with transfers and mobility, self care . Home is a 1 story with 1 step(s) to enter.  Discussed discharge planner role and team conference. DME in the home includes a none.  In the event of additional support need discussed HH and OP. Pending physiatry recommendation.              Pre-Screen Completed: 2/5/2024 11:21 AM Jonh Griggs

## 2024-02-05 NOTE — CARE PLAN
The patient is Stable - Low risk of patient condition declining or worsening    Shift Goals  Clinical Goals: q4h neuro, pain management  Patient Goals: gain strength back  Family Goals: KAL    Progress made toward(s) clinical / shift goals:  Pt reports no pain at this time, q4h neuros and NIH, d/c planning, bed alarm on, call light within reach    Patient is not progressing towards the following goals:      Problem: Discharge Planning - Stroke  Goal: Ensure Stroke Core Measures are met prior to discharge  Outcome: Progressing     Problem: Neuro Status  Goal: Neuro status will remain stable or improve  Outcome: Progressing

## 2024-02-05 NOTE — DISCHARGE PLANNING
Per MRI Acute left thalamic infarct. No evidence of hemorrhagic consideration. Please have therapy update for need of post acute services.

## 2024-02-06 PROBLEM — I48.0 PAROXYSMAL A-FIB (HCC): Status: ACTIVE | Noted: 2024-01-01

## 2024-02-06 PROBLEM — I48.91 A-FIB (HCC): Status: ACTIVE | Noted: 2024-01-01

## 2024-02-06 PROBLEM — E55.9 VITAMIN D INSUFFICIENCY: Status: ACTIVE | Noted: 2024-01-01

## 2024-02-06 PROBLEM — I50.9 CHF (CONGESTIVE HEART FAILURE) (HCC): Status: ACTIVE | Noted: 2024-01-01

## 2024-02-06 NOTE — ASSESSMENT & PLAN NOTE
Has hx  Has hx of AICD  BNP: 3537 (4/2023) --> 3703 (2/2024)  Cont Eliquis  Echo (1/25): EF 25%, RVSP 53

## 2024-02-06 NOTE — ASSESSMENT & PLAN NOTE
HbA1c 5.9  Serum glucose controlled on Lantus  Continue SSI while inpt  Outpt meds include Tresiba 10 u qd

## 2024-02-06 NOTE — FLOWSHEET NOTE
02/05/24 1658   Protocol Assessment   Initial Assessment Yes   Patient History   Pulmonary Diagnosis Agent orange exposure, polycythemia vera   Procedures Relevant to Respiratory Status None   Home O2 No   Nocturnal CPAP No   Home Treatments/Frequency No   Sleep Apnea Screening   Have you had a sleep study? No   Have you been diagnosed with sleep apnea? No   S - Have you been told that you SNORE? 0   T - Are you often TIRED during the day? 0   O - Do you know if you stop breathing or has anyone witnessed you stop breathing while you were asleep? (OBSTRUCTION) 0   P - Do you have high blood PRESSURE or on medication to control high blood pressure? 0   B - Is your Body Mass Index greater than 35? (BMI) 0   A - Are you 50 years old or older? (AGE) 1   N - Are you a male with a NECK circumference greater than 17 inches, or a female with a neck circumference greater than 16 inches? 0   G - Are you male? (GENDER) 1   Stop Bang Total Score 2   COPD Risk Screening   Do you have a history of COPD? No   COPD Population Screener   During the past 4 weeks, how much did you feel short of breath? 0   Do you ever cough up any mucus or phlegm? 0   In the past 12 months, you do less than you used to because of your breathing problems 0   Have you smoked at least 100 cigarettes in your entire life? 0   How old are you? 2   COPD Screening Score 2   COPD Coordinator Not Recommended Yes   Protocol Pathways   Protocol Pathways None

## 2024-02-06 NOTE — PROGRESS NOTES
NURSING DAILY NOTE    Name: Francesco Schulte   Date of Admission: 2/5/2024   Admitting Diagnosis: Left thalamic infarction (HCC)  Attending Physician: Jorgito Starks M.d.  Allergies: Atorvastatin, Doxycycline, Lisinopril, Simvastatin, Statins [hmg-coa-r inhibitors], Beta adrenergic blockers, Eplerenone, Metformin, and Spironolactone    Safety  Patient Assist  mod  Patient Precautions     Precaution Comments     Bed Transfer Status     Toilet Transfer Status      Assistive Devices  Rails, Wheelchair  Oxygen  None - Room Air  Diet/Therapeutic Dining  Current Diet Order   Procedures    Diet Order Diet: Cardiac; Second Modifier: (optional): Consistent CHO (Diabetic)     Pill Administration  whole  Agitated Behavioral Scale     ABS Level of Severity       Fall Risk  Has the patient had a fall this admission?   No  Ghazal Mcelroy Fall Risk Scoring  10, LOW RISK  Fall Risk Safety Measures  bed alarm and poor balance    Vitals  Temperature: 36.6 °C (97.8 °F)  Temp src: Oral  Pulse: 80  Respiration: 18  Blood Pressure : 138/84  Blood Pressure MAP (Calculated): 102 MM HG  BP Location: Left, Upper Arm  Patient BP Position: Supine     Oxygen  Pulse Oximetry: 96 %  O2 (LPM): 0  O2 Delivery Device: None - Room Air    Bowel and Bladder  Last Bowel Movement  02/05/24 (per patient)  Stool Type     Bowel Device   (no BM since admit)  Continent  Bladder: Did not void (no void since admit)   Bowel: No movement (no BM since admit)  Bladder Function  Urine Void (mL): 300 ml  Urine Color: Adrienne  Genitourinary Assessment   Bladder Assessment (WDL):  WDL Except  Urine Color: Adrienne  Bladder Device:  (no void since admit)    Skin  Neal Score   18  Sensory Interventions   Bed Types: Standard/Trauma Mattress  Skin Preventative Measures: Pillows in Use for Support / Positioning  Moisture Interventions         Pain  Pain Rating Scale  2 - Notice Pain, does not interfere  with activities  Pain Location  Generalized  Pain Location Orientation     Pain Interventions   Rest, Repositioned    ADLs    Bathing      Linen Change      Personal Hygiene     Chlorhexidine Bath      Oral Care     Teeth/Dentures     Shave     Nutrition Percentage Eaten     Environmental Precautions     Patient Turns/Positioning  Patient Turns Self from Side to Side  Patient Turns Assistance/Tolerance     Bed Positions     Head of Bed Elevated         Psychosocial/Neurologic Assessment  Psychosocial Assessment  Psychosocial (WDL):  Within Defined Limits  Neurologic Assessment  Neuro (WDL): Exceptions to WDL  Level of Consciousness: Alert  Orientation Level: Oriented X4  Cognition: Follows commands  Speech: Clear  Pupil Assesment: No  Motor Function/Sensation Assessment: Motor strength, Sensation  RUE Sensation: Tingling, Numbness  Muscle Strength Right Arm: Good Strength Against Gravity and Moderate Resistance  LUE Sensation: Full sensation  Muscle Strength Left Arm: Good Strength Against Gravity and Moderate Resistance  RLE Sensation: Numbness, Tingling  Muscle Strength Right Leg: Good Strength Against Gravity and Moderate Resistance  LLE Sensation: Full sensation  Muscle Strength Left Leg: Good Strength Against Gravity and Moderate Resistance  EENT (WDL):  WDL Except    Cardio/Pulmonary Assessment  Edema      Respiratory Breath Sounds  RUL Breath Sounds: Clear  RML Breath Sounds: Clear  RLL Breath Sounds: Clear  EVELINA Breath Sounds: Clear  LLL Breath Sounds: Clear  Cardiac Assessment   Cardiac (WDL):  WDL Except (hx afib, cardiomyopathy, aicd, cad, stemi, htn, hld, chf)

## 2024-02-06 NOTE — PROGRESS NOTES
"  Physical Medicine & Rehabilitation Progress Note    Encounter Date: 2/6/2024    Chief Complaint: Decreased mobility, weakness    Interval Events (Subjective):  Patient sitting up in room. He reports therapy is going well. Discussed about hospitalist consult. He is in agreement.     Objective:  VITAL SIGNS: BP (!) 147/92   Pulse 72   Temp 36.6 °C (97.8 °F) (Oral)   Resp 18   Ht 1.854 m (6' 1\")   Wt 80.7 kg (178 lb)   SpO2 92%   BMI 23.48 kg/m²   Gen: NAD  Psych: Mood and affect appropriate  CV: RRR, 0 edema  Resp: CTAB, no upper airway sounds  Abd: NTND  Neuro: AOx4, following commands    Laboratory Values:  Recent Results (from the past 72 hour(s))   POCT glucose device results    Collection Time: 02/03/24  4:51 PM   Result Value Ref Range    POC Glucose, Blood 167 (H) 65 - 99 mg/dL   POCT glucose device results    Collection Time: 02/03/24  8:05 PM   Result Value Ref Range    POC Glucose, Blood 147 (H) 65 - 99 mg/dL   POCT glucose device results    Collection Time: 02/04/24  5:47 AM   Result Value Ref Range    POC Glucose, Blood 104 (H) 65 - 99 mg/dL   TROPONIN    Collection Time: 02/04/24  8:04 AM   Result Value Ref Range    Troponin T 53 (H) 6 - 19 ng/L   Renal Function Panel    Collection Time: 02/04/24  8:04 AM   Result Value Ref Range    Sodium 137 135 - 145 mmol/L    Potassium 3.9 3.6 - 5.5 mmol/L    Chloride 104 96 - 112 mmol/L    Co2 22 20 - 33 mmol/L    Glucose 118 (H) 65 - 99 mg/dL    Creatinine 1.05 0.50 - 1.40 mg/dL    Bun 22 8 - 22 mg/dL    Calcium 9.0 8.4 - 10.2 mg/dL    Correct Calcium 9.5 8.5 - 10.5 mg/dL    Phosphorus 3.0 2.5 - 4.5 mg/dL    Albumin 3.4 3.2 - 4.9 g/dL   MAGNESIUM    Collection Time: 02/04/24  8:04 AM   Result Value Ref Range    Magnesium 2.0 1.5 - 2.5 mg/dL   CBC WITHOUT DIFFERENTIAL    Collection Time: 02/04/24  8:04 AM   Result Value Ref Range    WBC 9.5 4.8 - 10.8 K/uL    RBC 6.14 (H) 4.70 - 6.10 M/uL    Hemoglobin 16.2 14.0 - 18.0 g/dL    Hematocrit 50.8 42.0 - 52.0 % "    MCV 82.7 81.4 - 97.8 fL    MCH 26.4 (L) 27.0 - 33.0 pg    MCHC 31.9 (L) 32.3 - 36.5 g/dL    RDW 69.0 (H) 35.9 - 50.0 fL    Platelet Count 389 164 - 446 K/uL    MPV 10.3 9.0 - 12.9 fL   POCT glucose device results    Collection Time: 02/04/24  8:04 AM   Result Value Ref Range    POC Glucose, Blood 131 (H) 65 - 99 mg/dL   ESTIMATED GFR    Collection Time: 02/04/24  8:04 AM   Result Value Ref Range    GFR (CKD-EPI) 76 >60 mL/min/1.73 m 2   POCT glucose device results    Collection Time: 02/05/24  5:34 AM   Result Value Ref Range    POC Glucose, Blood 100 (H) 65 - 99 mg/dL   POCT glucose device results    Collection Time: 02/05/24  4:58 PM   Result Value Ref Range    POC Glucose, Blood 204 (H) 65 - 99 mg/dL   CBC with Differential    Collection Time: 02/06/24  6:39 AM   Result Value Ref Range    WBC 10.8 4.8 - 10.8 K/uL    RBC 6.65 (H) 4.70 - 6.10 M/uL    Hemoglobin 17.3 14.0 - 18.0 g/dL    Hematocrit 55.7 (H) 42.0 - 52.0 %    MCV 83.8 81.4 - 97.8 fL    MCH 26.0 (L) 27.0 - 33.0 pg    MCHC 31.1 (L) 32.3 - 36.5 g/dL    RDW 70.8 (H) 35.9 - 50.0 fL    Platelet Count 387 164 - 446 K/uL    MPV 10.5 9.0 - 12.9 fL    Neutrophils-Polys 84.10 (H) 44.00 - 72.00 %    Lymphocytes 5.40 (L) 22.00 - 41.00 %    Monocytes 4.50 0.00 - 13.40 %    Eosinophils 4.00 0.00 - 6.90 %    Basophils 1.50 0.00 - 1.80 %    Immature Granulocytes 0.50 0.00 - 0.90 %    Nucleated RBC 0.00 0.00 - 0.20 /100 WBC    Neutrophils (Absolute) 9.09 (H) 1.82 - 7.42 K/uL    Lymphs (Absolute) 0.58 (L) 1.00 - 4.80 K/uL    Monos (Absolute) 0.49 0.00 - 0.85 K/uL    Eos (Absolute) 0.43 0.00 - 0.51 K/uL    Baso (Absolute) 0.16 (H) 0.00 - 0.12 K/uL    Immature Granulocytes (abs) 0.05 0.00 - 0.11 K/uL    NRBC (Absolute) 0.00 K/uL   Comp Metabolic Panel (CMP)    Collection Time: 02/06/24  6:39 AM   Result Value Ref Range    Sodium 141 135 - 145 mmol/L    Potassium 4.5 3.6 - 5.5 mmol/L    Chloride 106 96 - 112 mmol/L    Co2 25 20 - 33 mmol/L    Anion Gap 10.0 7.0 - 16.0     Glucose 113 (H) 65 - 99 mg/dL    Bun 19 8 - 22 mg/dL    Creatinine 1.00 0.50 - 1.40 mg/dL    Calcium 9.3 8.5 - 10.5 mg/dL    Correct Calcium 9.6 8.5 - 10.5 mg/dL    AST(SGOT) 12 12 - 45 U/L    ALT(SGPT) 11 2 - 50 U/L    Alkaline Phosphatase 73 30 - 99 U/L    Total Bilirubin 0.6 0.1 - 1.5 mg/dL    Albumin 3.6 3.2 - 4.9 g/dL    Total Protein 8.2 6.0 - 8.2 g/dL    Globulin 4.6 (H) 1.9 - 3.5 g/dL    A-G Ratio 0.8 g/dL   HEMOGLOBIN A1C    Collection Time: 02/06/24  6:39 AM   Result Value Ref Range    Glycohemoglobin 5.9 (H) 4.0 - 5.6 %    Est Avg Glucose 123 mg/dL   TSH with Reflex to FT4    Collection Time: 02/06/24  6:39 AM   Result Value Ref Range    TSH 2.740 0.380 - 5.330 uIU/mL   Vitamin D, 25-hydroxy (blood)    Collection Time: 02/06/24  6:39 AM   Result Value Ref Range    25-Hydroxy   Vitamin D 25 27 (L) 30 - 100 ng/mL   ESTIMATED GFR    Collection Time: 02/06/24  6:39 AM   Result Value Ref Range    GFR (CKD-EPI) 80 >60 mL/min/1.73 m 2   POCT glucose device results    Collection Time: 02/06/24  7:42 AM   Result Value Ref Range    POC Glucose, Blood 129 (H) 65 - 99 mg/dL   POCT glucose device results    Collection Time: 02/06/24 11:08 AM   Result Value Ref Range    POC Glucose, Blood 194 (H) 65 - 99 mg/dL       Medications:  Scheduled Medications   Medication Dose Frequency    Pharmacy Consult Request  1 Each PHARMACY TO DOSE    senna-docusate  2 Tablet BID    omeprazole  20 mg DAILY    amLODIPine  5 mg Q DAY    apixaban  5 mg BID    aspirin  81 mg Q EVENING    finasteride  5 mg QDAY    insulin GLARGINE  10 Units QAM INSULIN    insulin regular  1-6 Units 4X/DAY ACHS    tamsulosin  0.4 mg DAILY     PRN medications: Respiratory Therapy Consult, hydrALAZINE, acetaminophen, senna-docusate **AND** polyethylene glycol/lytes, mag hydrox-al hydrox-simeth, ondansetron **OR** ondansetron, traZODone, sodium chloride, oxyCODONE immediate-release **OR** oxyCODONE immediate-release, insulin regular **AND** POC blood glucose  manual result **AND** NOTIFY MD and PharmD **AND** Administer 20 grams of glucose (approximately 8 ounces of fruit juice) every 15 minutes PRN FSBG less than 70 mg/dL **AND** dextrose bolus, dicyclomine    Diet:  Current Diet Order   Procedures    Diet Order Diet: Cardiac; Second Modifier: (optional): Consistent CHO (Diabetic)       Medical Decision Making and Plan:  L CVA - 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 He has been started on ASA and Eliquis  -PT and OT for mobility and ADLs. Per guidelines, 15 hours per week between PT, OT and/or SLP.  -Follow-up Neurology     HTN/A Fib/sCHF - Patient on Eliquis. Patient on Amlodipine 5 mg daily.  Consult hospitalist. History of low SBP at times.      HLD - Patient allergic to statins.      Hyponatremia - Check AM CMP     DM2 with hyperglycemia - Patient on Lantus 10 daily and SSI. Consult hospitalist, continue Lantus and SSI     BPH - Patient on Finasteride 5 mg and Flomax 0.4 mg. Continue Finasteride 5 mg and Flomax 0.4 mg     Pain - Patient on PRN APAP and Oxycodone     Skin - Patient at risk for skin breakdown due to debility in areas including sacrum, achilles, elbows and head in addition to other sites. Nursing to assess skin daily.      GI Ppx - Patient on Prilosec for GERD prophylaxis. Patient on Senna-docusate for constipation prophylaxis.      DVT Ppx - Patient Eliquis on transfer.  ____________________________________    T. Edouard Starks MD/PhD  [facility] - Physical Medicine & Rehabilitation   [facility] - Brain Injury Medicine   ____________________________________

## 2024-02-06 NOTE — FLOWSHEET NOTE
02/05/24 1701   Events/Summary/Plan   Events/Summary/Plan RT Assessment   Vital Signs   Pulse 80   Respiration 18   Pulse Oximetry 96 %   $ Pulse Oximetry (Spot Check) Yes   Respiratory Assessment   Respiratory Pattern Within Normal Limits   Level of Consciousness Alert   Chest Exam   Work Of Breathing / Effort Within Normal Limits   Breath Sounds   RUL Breath Sounds Clear   RML Breath Sounds Clear   RLL Breath Sounds Clear   EVELNIA Breath Sounds Clear   LLL Breath Sounds Clear   Secretions   Cough Strong;Non Productive   Oxygen   O2 Delivery Device None - Room Air   Smoking History   Have you ever smoked Never

## 2024-02-06 NOTE — ASSESSMENT & PLAN NOTE
Echo EF 20%, mod AI, RVSP 53 mmHg  H/O AICD  On no rate controlling agents  Monitor for tachydysrhythmias  Anticoagulated on Eliquis  BNP improving

## 2024-02-06 NOTE — PROGRESS NOTES
NURSING DAILY NOTE    Name: Francesco Schulte   Date of Admission: 2/5/2024   Admitting Diagnosis: Left thalamic infarction (HCC)  Attending Physician: Jorgito Starks M.d.  Allergies: Atorvastatin, Doxycycline, Lisinopril, Simvastatin, Statins [hmg-coa-r inhibitors], Beta adrenergic blockers, Eplerenone, Metformin, and Spironolactone    Safety  Patient Assist  mod  Patient Precautions     Precaution Comments     Bed Transfer Status     Toilet Transfer Status      Assistive Devices  Rails, Wheelchair  Oxygen  None - Room Air  Diet/Therapeutic Dining  Current Diet Order   Procedures    Diet Order Diet: Cardiac; Second Modifier: (optional): Consistent CHO (Diabetic)     Pill Administration  whole  Agitated Behavioral Scale     ABS Level of Severity       Fall Risk  Has the patient had a fall this admission?   No  Ghazal Mcelroy Fall Risk Scoring  10, LOW RISK  Fall Risk Safety Measures  bed alarm and chair alarm    Vitals  Temperature: 36.6 °C (97.8 °F)  Temp src: Oral  Pulse: 80  Respiration: 18  Blood Pressure : 138/84  Blood Pressure MAP (Calculated): 102 MM HG  BP Location: Left, Upper Arm  Patient BP Position: Supine     Oxygen  Pulse Oximetry: 96 %  O2 (LPM): 0  O2 Delivery Device: None - Room Air    Bowel and Bladder  Last Bowel Movement  02/05/24 (per patient)  Stool Type     Bowel Device   (no BM since admit)  Continent  Bladder: Did not void (no void since admit)   Bowel: No movement (no BM since admit)  Bladder Function  Urine Void (mL): 300 ml  Urine Color: Adrienne  Genitourinary Assessment   Bladder Assessment (WDL):  WDL Except  Urine Color: Adrienne  Bladder Device: Urinal, Bathroom  Bladder Medications: Yes    Skin  Neal Score   18  Sensory Interventions   Bed Types: Standard/Trauma Mattress  Skin Preventative Measures: Pillows in Use for Support / Positioning  Moisture Interventions         Pain  Pain Rating Scale  0 - No Pain  Pain  Location  Generalized  Pain Location Orientation     Pain Interventions   Rest, Repositioned    ADLs    Bathing      Linen Change      Personal Hygiene     Chlorhexidine Bath      Oral Care     Teeth/Dentures     Shave     Nutrition Percentage Eaten     Environmental Precautions     Patient Turns/Positioning  Patient Turns Self from Side to Side  Patient Turns Assistance/Tolerance     Bed Positions     Head of Bed Elevated         Psychosocial/Neurologic Assessment  Psychosocial Assessment  Psychosocial (WDL):  Within Defined Limits  Neurologic Assessment  Neuro (WDL): Exceptions to WDL  Level of Consciousness: Alert  Orientation Level: Oriented X4  Cognition: Follows commands  Speech: Clear  Pupil Assesment: No  Motor Function/Sensation Assessment: Motor strength, Sensation  RUE Sensation: Tingling, Numbness  Muscle Strength Right Arm: Good Strength Against Gravity and Moderate Resistance  LUE Sensation: Full sensation  Muscle Strength Left Arm: Good Strength Against Gravity and Moderate Resistance  RLE Sensation: Numbness, Tingling  Muscle Strength Right Leg: Good Strength Against Gravity and Moderate Resistance  LLE Sensation: Full sensation  Muscle Strength Left Leg: Good Strength Against Gravity and Moderate Resistance  EENT (WDL):  WDL Except    Cardio/Pulmonary Assessment  Edema      Respiratory Breath Sounds  RUL Breath Sounds: Clear  RML Breath Sounds: Clear  RLL Breath Sounds: Clear  EVELINA Breath Sounds: Clear  LLL Breath Sounds: Clear  Cardiac Assessment   Cardiac (WDL):  WDL Except (hx afib, cardiomyopathy, aicd, cad, stemi, htn, hld, chf)

## 2024-02-06 NOTE — CONSULTS
"DATE OF SERVICE:  2/6/2023    REQUESTING PHYSICIAN:  Edouard Starks MD    CHIEF COMPLAINT / REASON FOR CONSULTATION:   Hypertension  Diabetes    HISTORY OF PRESENT ILLNESS:  This is a 72 y/o male with a PMH significant for hypertension, afib, diabetes, CHF with EF of 15-25% s/p AICD, hx of Lupus, and polycythemia vera who presented with new right sided weakness on 2/2/24.  CT and MRI were consistent with right occipital CVA.  Patient was placed on ASA.  Patient reportedly cannot have statins and has had a history of low SBP.  He was started on Amlodipine and monitored.  Patient had elevated troponins which were trended down.       Because of the patient's weakness and debility, Rehab was consulted, evaluated the patient, and was deemed a good Rehab candidate.  The patient was transferred over to the Rehab facility on 2/5/2022.      The patient denies fever, chills, nausea, vomiting, headaches, blurry vision, or chest pain.    REVIEW OF SYSTEMS: All review of systems are negative pre AMA and CMS criteria except for that stated in the HPI.    PAST MEDICAL HISTORY:  Past Medical History:   Diagnosis Date    Acute bacterial endocarditis 11/02/2021    Agent orange exposure     Anesthesia     resistant to pain meds and \"numbing medications\"    Cancer (HCC)     polycythemia vera    Catheter-associated urinary tract infection (HCC) 08/09/2022    This is a new problem, patient has had urinary catheter inserted in emergency room 4 weeks ago due to urinary retention.  He has been experiencing subjective fever, right flank pain.  He tells me he has experienced similar symptoms are when he was diagnosed with urinary tract infection last time.          Diabetes (HCC)     insulin and oral meds    Family history of punctured lung 2002    left lung    Heart attack (HCC) 03/2017    Followed by [facility]    Hemorrhagic disorder ([facility])     on blood thinners    High cholesterol     Ischemic cardiomyopathy     Kidney stones     " hx of kidney stones    Lupus ([facility]) 11/15/2019    Orthostatic hypotension 03/29/2018    Pain     headache pain    Polycythemia vera(238.4)     Stroke ([facility]) 2016    r/t afib; eye issues resolved afterward    Urinary bladder disorder     enlarged prostate, weak stream, difficulty urinating    Vertigo        PAST SURGICAL HISTORY:  Past Surgical History:   Procedure Laterality Date    MO DX BONE MARROW BIOPSIES Left 11/8/2023    Procedure: BIOPSY, BONE MARROW, USING NEEDLE OR TROCAR;  Surgeon: Ingrid Zambrano M.D.;  Location: SURGERY SAME DAY [facility];  Service: Orthopedics    BONE ASPIRATION BIOPSY Left 11/8/2023    Procedure: BONE MARROW BIOPSY AND ASPIRATION - DR. ANDERSON;  Surgeon: Ingrid Zambrano M.D.;  Location: SURGERY SAME DAY [facility];  Service: Orthopedics    PAMELA N/A 10/30/2021    Procedure: ECHOCARDIOGRAM, TRANSESOPHAGEAL;  Surgeon: Beau Gutierrez M.D.;  Location: [facility];  Service: Cardiac    AICD IMPLANT  07/29/2021    [employer] [id number] implanted by Dr. Isaac.    SPLIT THICKNESS SKIN GRAFT N/A 8/23/2020    Procedure: APPLICATION, GRAFT, SKIN, SPLIT-THICKNESS;  Surgeon: Elisa Craig M.D.;  Location: [facility];  Service: Plastics    SKIN ABSCESS INCISION AND DRAINAGE Right 8/3/2020    Procedure: INCISION AND DRAINAGE - SCROTAL WOUND - WOUND VAC PLACEMENT;  Surgeon: Jaylen Hayes M.D.;  Location: [facility];  Service: Urology    MO EXPLORE SCROTUM Right 7/31/2020    Procedure: EXPLORATION, SCROTUM - FOR WASH OUT OF SCROTAL WOUND & WOUND VAC PLACEMENT;  Surgeon: Ferny Rosales M.D.;  Location: [facility];  Service: Urology    MO EXPLORE SCROTUM Right 7/29/2020    Procedure: EXPLORATION, SCROTUM - FOR I & D OF SCROTAL AND  GROIN WOUND;  Surgeon: Donta Viera M.D.;  Location: [facility];  Service: Urology    MO INCIS/DRAIN SCROTUM/TESTIS,EPIDIDYM Right 7/25/2020    Procedure: INCISION AND DRAINAGE, SCROTUM;  Surgeon: Nick JADE  "JANES Negro;  Location: SURGERY [facility];  Service: Urology    TEMPORAL ARTERY BIOPSY Right 2018    Procedure: TEMPORAL ARTERY BIOPSY;  Surgeon: Rajendra Aguilar M.D.;  Location: SURGERY SAME DAY [facility];  Service: General    CAROTID ENDARTERECTOMY Right 3/21/2018    Procedure: CAROTID ENDARTERECTOMY- W/EEG MONITORING;  Surgeon: Benny Morfin M.D.;  Location: SURGERY [facility];  Service: General    APPENDECTOMY      CATH PLACEMENT      right arm    LAMINOTOMY      diskectomy; C4    OTHER CARDIAC SURGERY      stents x 3       Allergies   Allergen Reactions    Atorvastatin      States he  when he took it and required life saving measures    Doxycycline      Swelling lips and difficulties swallowing    Lisinopril Anaphylaxis     Pt coded    Simvastatin      States he  while taking a statin and required life saving measures    Statins [Hmg-Coa-R Inhibitors]      States he  while taking a statin and required life saving measures    Beta Adrenergic Blockers Unspecified     dizziness    Eplerenone Unspecified     Intolerance, dehydration, altered mental status    Metformin Palpitations and Unspecified     Cardiac effects  \"Similar to a heart attack, I was hospitalized\"    Spironolactone Palpitations       CURRENT MEDICATIONS:    Current Facility-Administered Medications:     Respiratory Therapy Consult    Pharmacy Consult Request    hydrALAZINE    acetaminophen    senna-docusate **AND** polyethylene glycol/lytes    omeprazole    mag hydrox-al hydrox-simeth    ondansetron **OR** ondansetron    traZODone    sodium chloride    oxyCODONE immediate-release **OR** oxyCODONE immediate-release    amLODIPine    apixaban    aspirin    finasteride    insulin GLARGINE    insulin regular **AND** POC blood glucose manual result **AND** NOTIFY MD and PharmD **AND** Administer 20 grams of glucose (approximately 8 ounces of fruit juice) every 15 minutes PRN FSBG less than 70 mg/dL **AND** dextrose " bolus    tamsulosin    dicyclomine    Social History     Socioeconomic History    Marital status:    Tobacco Use    Smoking status: Never    Smokeless tobacco: Never   Vaping Use    Vaping Use: Never used   Substance and Sexual Activity    Alcohol use: No    Drug use: No     Social Determinants of Health     Financial Resource Strain: Low Risk  (8/25/2022)    Overall Financial Resource Strain (CARDIA)     Difficulty of Paying Living Expenses: Not hard at all   Food Insecurity: No Food Insecurity (8/25/2022)    Hunger Vital Sign     Worried About Running Out of Food in the Last Year: Never true     Ran Out of Food in the Last Year: Never true   Transportation Needs: No Transportation Needs (2/6/2024)    PRAPARE - Transportation     Lack of Transportation (Medical): No     Lack of Transportation (Non-Medical): No       FAMILY HISTORY:  was reviewed and is not pertinent to this consultation.    PHYSICAL EXAMINATION:  VITAL SIGNS:  Temp is 97.8, blood pressure is 138//92, heart rate is 72, respiratory rate is 18.  GENERAL:  Patient was lying in bed in no distress.  HEENT:  Pupils were equal, round and reactive to light and accomodation.  Oral mucosa was pink and moist.  NECK:  Soft.  Supple.  No JVD.  HEART:  Regular rate and rhythm.  Normal S1 and S2.  No murmurs were appreciated.  LUNGS:  Are clear to auscultation bilaterally.  ABDOMEN:  Soft, non tender, non distended.  Bowels sound were positive in all four quadrants.  EXTREMITIES:  No clubbing, cyanosis.  There was no lower extremity edema.  NEUROLOGIC:  Cranial nerves two through twelve were grossly intact.    LABS:  Lab Results   Component Value Date/Time    SODIUM 141 02/06/2024 06:39 AM    POTASSIUM 4.5 02/06/2024 06:39 AM    CHLORIDE 106 02/06/2024 06:39 AM    CO2 25 02/06/2024 06:39 AM    GLUCOSE 113 (H) 02/06/2024 06:39 AM    BUN 19 02/06/2024 06:39 AM    CREATININE 1.00 02/06/2024 06:39 AM      Lab Results   Component Value Date/Time    WBC  10.8 02/06/2024 06:39 AM    RBC 6.65 (H) 02/06/2024 06:39 AM    HEMOGLOBIN 17.3 02/06/2024 06:39 AM    HEMATOCRIT 55.7 (H) 02/06/2024 06:39 AM    MCV 83.8 02/06/2024 06:39 AM    MCH 26.0 (L) 02/06/2024 06:39 AM    MCHC 31.1 (L) 02/06/2024 06:39 AM    MPV 10.5 02/06/2024 06:39 AM    NEUTSPOLYS 84.10 (H) 02/06/2024 06:39 AM    LYMPHOCYTES 5.40 (L) 02/06/2024 06:39 AM    MONOCYTES 4.50 02/06/2024 06:39 AM    EOSINOPHILS 4.00 02/06/2024 06:39 AM    BASOPHILS 1.50 02/06/2024 06:39 AM    HYPOCHROMIA 1+ 08/30/2020 06:04 AM    ANISOCYTOSIS 1+ 02/02/2024 04:03 PM      Lab Results   Component Value Date/Time    PROTHROMBTM 19.3 (H) 02/02/2024 04:03 PM    INR 1.55 (H) 02/02/2024 04:03 PM        Left thalamic infarction (HCC)  Cont ASA  Cont Eliquis    Essential hypertension  SBP: 138-147  Cont Norvasc: 5 mg daily  Cont to monitor    Vitamin D insufficiency  Vit D: 27  Will start supplements    Type 2 diabetes mellitus with hyperglycemia (HCC)  Hba1c: 5.9 (2/6)  BS labile: 100-204  Cont Glargine: 10 units qam  Will change SS coverage to Lispro  Note: home meds includes Tresiba 10 units qd (and sometimes takes less depending on the BS)  Cont to monitor    Paroxysmal A-fib (HCC)  Has a hx  Last few EKG's showed normal sinus rhythm  Not on any rate controlling meds  Cont Eliquis  Cont to monitor    CHF (congestive heart failure) (HCC)  Has hx  Has hx of AICD  Cont Eliquis  Echo (1/25): EF 25%, RVSP 53      This case has been discussed with the attending Physiatrist.    Thank you for the consultation.  Will follow the patient with you.

## 2024-02-06 NOTE — THERAPY
Physical Therapy   Initial Evaluation     Patient Name: Francesco Schulte  Age:  73 y.o., Sex:  male  Medical Record #: [id number]  Today's Date: 2/6/2024     Subjective    Patient excited about PT yulissa linares relaying PLOF as active, asking PT to run and show his push-ups; PT went over passport and educated regarding functional mobility goals     Objective       02/06/24 1301   PT Charge Group   PT Therapeutic Activities (Units) 1   PT Evaluation PT Evaluation Low   PT Total Time Spent   PT Individual Total Time Spent (Mins) 60   Prior Living Situation   Prior Services Home-Independent   Housing / Facility 1 Story House   Steps Into Home 1  (through garage)   Rail None   Equipment Owned Single Point Cane;Front-Wheel Walker   Lives with - Patient's Self Care Capacity Spouse   Comments lives in [place] with wife who works as , dtr (Pelon lives in [place], other dtr Charla lives in [place]); Aretha reports baseline dementia   Prior Functioning: Everyday Activities   Indoor Mobility (Ambulation) Independent   Stairs Independent   Prior Device Use   (SPC)   Pain 0 - 10 Group   Therapist Pain Assessment During Activity;Post Activity  (denies pain)   Roll Left and Right   Assistance Needed Adaptive equipment;Independent   CARE Score - Roll Left and Right 6   Roll Left and Right Discharge Goal   Discharge Goal 6   Sit to Lying   Assistance Needed Incidental touching;Supervision;Set-up / clean-up;Adaptive equipment   CARE Score - Sit to Lying 4   Sit to Lying Discharge Goal   Discharge Goal 6   Lying to Sitting on Side of Bed   Assistance Needed Incidental touching;Supervision;Set-up / clean-up;Adaptive equipment   CARE Score - Lying to Sitting on Side of Bed 4   Lying to Sitting on Side of Bed Discharge Goal   Discharge Goal 6   Sit to Stand   Assistance Needed Supervision;Set-up / clean-up;Adaptive equipment   CARE Score - Sit to Stand 4   Sit to Stand Discharge Goal   Discharge Goal 6    Chair/Bed-to-Chair Transfer   Assistance Needed Incidental touching;Supervision;Set-up / clean-up;Adaptive equipment   CARE Score - Chair/Bed-to-Chair Transfer 4   Chair/Bed-to-Chair Transfer Discharge Goal   Discharge Goal 6   Car Transfer   Reason if not Attempted Safety concerns   CARE Score - Car Transfer 88   Car Transfer Discharge Goal   Discharge Goal 6   Walk 10 Feet   Assistance Needed Supervision;Set-up / clean-up;Adaptive equipment   CARE Score - Walk 10 Feet 4   Walk 10 Feet Discharge Goal   Discharge Goal 6   Walk 50 Feet with Two Turns   Assistance Needed Supervision;Adaptive equipment   CARE Score - Walk 50 Feet with Two Turns 4   Walk 50 Feet with Two Turns Discharge Goal   Discharge Goal 6   Walk 150 Feet   Assistance Needed Supervision;Set-up / clean-up;Adaptive equipment;Verbal cues   CARE Score - Walk 150 Feet 4   Walk 150 Feet Discharge Goal   Discharge Goal 6   Walking 10 Feet on Uneven Surfaces   Assistance Needed Incidental touching;Verbal cues;Supervision;Set-up / clean-up   CARE Score - Walking 10 Feet on Uneven Surfaces 4   Walking 10 Feet on Uneven Surfaces Discharge Goal   Discharge Goal 6   1 Step (Curb)   Assistance Needed Incidental touching;Verbal cues;Supervision;Set-up / clean-up;Adaptive equipment   CARE Score - 1 Step (Curb) 4   1 Step (Curb) Discharge Goal   Discharge Goal 6   4 Steps   Assistance Needed Incidental touching;Verbal cues;Supervision;Set-up / clean-up;Adaptive equipment   CARE Score - 4 Steps 4   4 Steps Discharge Goal   Discharge Goal 6   12 Steps   Assistance Needed Incidental touching;Verbal cues;Supervision;Set-up / clean-up;Adaptive equipment   CARE Score - 12 Steps 4   12 Steps Discharge Goal   Discharge Goal 6   Picking Up Object   Assistance Needed Incidental touching;Verbal cues;Supervision;Set-up / clean-up;Adaptive equipment   CARE Score - Picking Up Object 4   Picking Up Object Discharge Goal   Discharge Goal 6   Wheel 50 Feet with Two Turns   Reason  "if not Attempted Activity not applicable   CARE Score - Wheel 50 Feet with Two Turns 9   Type of Wheelchair/Scooter Manual   Wheel 50 Feet with Two Turns Discharge Goal   Discharge Goal 9   Wheel 150 Feet   Reason if not Attempted Activity not applicable   CARE Score - Wheel 150 Feet 9   Type of Wheelchair/Scooter Manual   Wheel 150 Feet Discharge Goal   Discharge Goal 9   Gait Functional Level of Assist    Gait Level Of Assist Contact Guard Assist   Assistive Device Single Point Cane   Distance (Feet) 100   # of Times Distance was Traveled 1   Deviation   (right foot \"scuff\" with fatigue)   Wheelchair Functional Level of Assist   Wheelchair Assist Stand by Assist   Distance Wheelchair (Feet or Distance) 100   Wheelchair Description   (bilateral LE propulsion)   Stairs Functional Level of Assist   Level of Assist with Stairs Standby Assist   # of Stairs Climbed 12   Stairs Description Hand rails;Verbal cueing   Transfer Functional Level of Assist   Bed, Chair, Wheelchair Transfer Contact Guard Assist   Bed Chair Wheelchair Transfer Description Set-up of equipment;Increased time;Verbal cueing   Problem List    Problems Impaired Ambulation;Motor Planning / Sequencing;Decreased Activity Tolerance   Precautions   Precautions Fall Risk   Current Discharge Plan   Current Discharge Plan Return to Prior Living Situation   Interdisciplinary Plan of Care Collaboration   IDT Collaboration with  Occupational Therapist;Speech Therapist;Family / Caregiver;Physician   Collaboration Comments PLOF; baseline eval scores   Benefit   Therapy Benefit Patient Would Benefit from Inpatient Rehabilitation Physical Therapy to Maximize Functional [place] with ADLs, IADLs and Mobility.   Strengths & Barriers   Strengths Willingly participates in therapeutic activities;Motivated for self care and independence   Barriers Dementia;Impaired balance;Impaired activity tolerance;Fatigue  (right foot drag with fatigue)       Assessment  Patient " is 73 y.o. male with a diagnosis of left thalmic infarction with right sided weakness.  Additional factors influencing patient status / progress (ie: cognitive factors, co-morbidities, social support, etc): dtr reports baseline undiagnosed dementia.      Plan  Recommend Physical Therapy 30-60 minutes per day 5-7 days per week for 7-10days for the following treatments:  PT Orthotics Training, PT Gait Training, PT Self Care/Home Eval, PT Therapeutic Exercises, PT Neuro Re-Ed/Balance, PT Therapeutic Activity, and PT Evaluation.    Passport items to be completed:  Get in/out of bed safely, in/out of a vehicle, safely use mobility device, walk or wheel around home/community, navigate up and down stairs, show how to get up/down from the ground, ensure home is accessible, demonstrate HEP, complete caregiver training    Goals:  Long term and short term goals have been discussed with patient and they are in agreement.    Physical Therapy Problems (Active)       Problem: Mobility       Dates: Start:  02/06/24         Goal: STG-Within one week, patient will ambulate community distances 200ft x 2 with left SPC SPV       Dates: Start:  02/06/24            Goal: STG-Within one week, patient will ascend and descend four to six stairs no rails with step over patterning SBA       Dates: Start:  02/06/24               Problem: Mobility Transfers       Dates: Start:  02/06/24         Goal: STG-Within one week, patient will transfer bed to chair SPV SPT       Dates: Start:  02/06/24               Problem: PT-Long Term Goals       Dates: Start:  02/06/24         Goal: LTG-By discharge, patient will ambulate 400ft left SPC mod I level surfaces, SPV outdoors       Dates: Start:  02/06/24            Goal: LTG-By discharge, patient will transfer one surface to another mod I SPT safely and consistently       Dates: Start:  02/06/24            Goal: LTG-By discharge, patient will perform home exercise program seated/standing for LE  strengthening to be done 3x/day in safe, independent home environment       Dates: Start:  02/06/24            Goal: LTG-By discharge, patient will up/down curb step to simulate home entrance with SPC with SPV       Dates: Start:  02/06/24

## 2024-02-06 NOTE — PROGRESS NOTES
4 Eyes Skin Assessment Completed by JOSLYN Galvin and JOSLYN Uribe.    Head WDL  Ears WDL  Nose WDL  Mouth WDL  Neck WDL  Breast/Chest WDL  Shoulder Blades WDL  Spine WDL  (R) Arm/Elbow/Hand WDL  (L) Arm/Elbow/Hand Abrasion  Abdomen WDL  Groin WDL  Scrotum/Coccyx/Buttocks Redness, Blanching, and Excoriation  (R) Leg WDL  (L) Leg WDL  (R) Heel/Foot/Toe Redness and Ulcer(s)  (L) Heel/Foot/Toe Redness and Ulcer(s)          Devices In Places Blood Pressure Cuff and Pacer      Interventions In Place Heel Float Boots, Waffle Overlay, Pillows, Q2 Turns, Barrier Cream, and Pressure Redistribution Mattress    Possible Skin Injury Yes    Pictures Uploaded Into Epic Yes  Wound Consult Placed Yes  RN Wound Prevention Protocol Ordered Yes

## 2024-02-06 NOTE — THERAPY
"Speech Language Pathology   Initial Assessment     Patient Name: Francesco Schulte  AGE:  73 y.o., SEX:  male  Medical Record #: [id number]  Today's Date: 2/6/2024     Subjective    Pt in bed upon arrival. Pt agreeable to attend session in SLP office. Pt pleasant and cooperative, however, did present with frustration during the memory portions of the assessment and stated \"honestly I didn't remember it because it wasn't important\". Pt reported that he has a bachelors degree in mechanical engineering and previously worked for the [employer], lives at home with spouse and was previously independent with medications and finances.      Objective       02/06/24 0903   Evaluation Charges   Charges Yes   SLP Speech Language Evaluation Speech Sound Language Comprehension   SLP Total Time Spent   SLP Individual Total Time Spent (Mins) 60   Prior Living Situation   Prior Services Home-Independent   Housing / Facility [address]   Lives with - Patient's Self Care Capacity Spouse   Prior Level Of Function   Communication Within Functional Limits   Swallow Within Functional Limits   Dentition Intact   Hearing Within Functional Limits for Evaluation   Hearing Aid None   Vision Reading    Patient's Primary Language English   Education Years Completed 16   Occupation (Pre-Hospital Vocational) Employed Part Time   Functional Level of Assist   Eating  --    Comprehension Modified Independent   Comprehension Description Glasses   Expression Modified Independent   Expression Description Verbal cueing   Social Interaction Supervision   Social Interaction Description Increased time;Verbal cues   Problem Solving Moderate Assist   Problem Solving Description Increased time;Supervision;Therapy schedule;Verbal cueing   Memory Total Assist   Memory Description Increased time;Medication;Supervision;Therapy schedule;Verbal cueing   SCCAN (Scales of Cognitive and Communicative Ability for Neurorehabilitation)   Oral Expression - Raw Score 16 "   Oral Expression - Scale Performance Score 84   Orientation - Raw Score 12   Orientation - Scale Performance Score 100   Memory - Raw Score 5   Memory - Scale Performance Score 26   Speech Comprehension - Raw Score 12   Speech Comprehension - Scale Performance Score 92   Reading Comprehension - Raw Score 10   Reading Comprehension - Scale Performance Score 83   Writing - Raw Score 6   Writing - Scale Performance Score 86   Attention - Raw Score 12   Attention - Scale Performance Score 75   Problem Solving - Raw Score 20   Problem Solving - Scale Performance Score 87   SCCAN Total Raw Score 72   SCCAN Degree of Severity Mild Impairment   Problem List   Problem List Cognitive-Linguistic Deficits;Attention Deficit;Memory Deficit   Current Discharge Plan   Current Discharge Plan Return to Prior Living Situation   Benefit   Therapy Benefit Patient would benefit from Inpatient Rehab Speech-Language Pathology to address above identified deficits.   Interdisciplinary Plan of Care Collaboration   IDT Collaboration with  Nursing;Physician   Patient Position at End of Therapy In Bed;Bed Alarm On;Call Light within Reach;Tray Table within Reach;Phone within Reach   Collaboration Comments POC   Strengths & Barriers   Strengths Able to follow instructions;Alert and oriented;Effective communication skills;Independent prior level of function;Pleasant and cooperative;Willingly participates in therapeutic activities;Supportive family   Barriers Impaired functional cognition   Speech Language Pathologist Assigned   Assigned SLP / Treatment Time / Comments LC/NC 60 cog         Assessment    Patient is 73 y.o. male with a diagnosis of left thalamic infarction (CVA).  Additional factors influencing patient status/progress (ie: cognitive factors, co-morbidities, social support, etc): pt previously indep. with medications and finances. Pt reports memory deficits however, pt stated that he had memory difficulties prior to current  hospitalization. [facility] Pt achieved a raw score of 72 indicating mild cognitive impairments. Pt presented with deficits in the areas of short term memory, complex attention, and executive functioning skills. Pt noted to be hyper verbose throughout eval and required frequent redirections to task and topic at hand. Pt would benefit from cognitive intervention addressing the above mentioned areas with goals to determine indep with medication and financial management skills as for pt would like to return to [facility].     Plan  Recommend Speech Therapy 30-60 minutes per day 5-6 days per week for 2 weeks for the following treatments:  SLP Speech Language Treatment and SLP Cognitive Skill Development.      Speech Therapy Problems (Active)       There are no active problems.

## 2024-02-06 NOTE — CARE PLAN
Problem: Bowel Elimination  Goal: Patient will participate in bowel management program  Note: Scheduled senna given at hs.Continent of bowel per report.LBM 2/5.Will continue to monitor.     Problem: Diabetes Management  Goal: Patient's ability to maintain appropriate glucose levels will be maintained or improve  Note: Pt refused blood sugar check at hs.Education given on the importance of procedure to no avail.Will continue to monitor and assess for s/sx of hypo/hyperglycemia.     Problem: Fall Risk - Rehab  Goal: Patient will remain free from falls  Note: Ghazal Mcelroy Fall risk Assessment Score: 10  Low fall risk interventions   - Call light within reach   - Yellow  socks   - Belongings within reach   - Bed in the lowest position

## 2024-02-06 NOTE — PROGRESS NOTES
Patient admitted to facility at 1435 via wheelchair; accompanied by hospital transport.  Patient assisted to room and positioned in bed for comfort and safety; call light within reach.  Patient assisted with stowing belongings and oriented to room and facility.  Admission assessment performed and documented in computer.  Admission paperwork completed; signed copies placed in chart.  Will continue to monitor.

## 2024-02-06 NOTE — THERAPY
Occupational Therapy   Initial Evaluation     Patient Name: Francesco Schulte  Age:  73 y.o., Sex:  male  Medical Record #: [id number]  Today's Date: 2/6/2024     Subjective    Pt encountered for OT supine in bed asleep. Upon awaking, pt pleasant and agreeable to participate. Pt indicated progressive improvements in R sided ROM and strength w/in the last week.      Objective       02/06/24 0701   OT Charge Group   Charges Yes   OT Self Care / ADL (Units) 1   OT Evaluation OT Evaluation Low   OT Total Time Spent   OT Individual Total Time Spent (Mins) 60   Prior Living Situation   Prior Services Home-Independent   Housing / Facility 1 Story House   Steps Into Home 1   Steps In Home 0   Bathroom Set up Bathtub / Shower Combination;Shower Curtain   Equipment Owned Single Point Cane   Lives with - Patient's Self Care Capacity Spouse   Comments Lives in [place] with SO. Has a daughter that lives near by who can provide intermittent support.   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; )   Prior Functioning: Everyday Activities   Self Care Independent   Indoor Mobility (Ambulation) Independent   Stairs Independent   Functional Cognition Independent   Prior Device Use None of the given options  (SPC)   Cognitive Pattern Assessment   Cognitive Pattern Assessment Used BIMS   Brief Interview for Mental Status (BIMS)   Repetition of Three Words (First Attempt) 3   Temporal  "Orientation: Year Correct   Temporal Orientation: Month Accurate within 5 days   Temporal Orientation: Day Correct   Recall: \"Sock\" Yes, no cue required   Recall: \"Blue\" Yes, after cueing (\"a color\")   Recall: \"Bed\" No, could not recall   BIMS Summary Score 12   Confusion Assessment Method (CAM)   Is there evidence of an acute change in mental status from the patient's baseline? Yes   Inattention Behavior present, fluctuates (comes and goes, changes in severity)   Disorganized thinking Behavior present, fluctuates (comes and goes, changes in severity)   Altered level of consciousness Behavior not present   Active ROM Upper Body   Active ROM Upper Body  WDL   Dominant Hand Right   Bed Mobility    Supine to Sit Supervised   Sit to Supine Supervised   Sit to Stand Contact Guard Assist   Scooting Standby Assist   Eating   Assistance Needed Independent   Physical Assistance Level No physical assistance   CARE Score - Eating 6   Eating Discharge Goal   Discharge Goal 6   Oral Hygiene   Assistance Needed Incidental touching   Physical Assistance Level No physical assistance   CARE Score - Oral Hygiene 4   Oral Hygiene Discharge Goal   Discharge Goal 6   Shower/Bathe Self   Reason if not Attempted Medical concerns   CARE Score - Shower/Bathe Self 88   Shower/Bathe Self Discharge Goal   Discharge Goal 5   Upper Body Dressing   Assistance Needed Supervision   Physical Assistance Level No physical assistance   CARE Score - Upper Body Dressing 4   Upper Body Dressing Discharge Goal   Discharge Goal 6   Lower Body Dressing   Assistance Needed Supervision   Physical Assistance Level No physical assistance   CARE Score - Lower Body Dressing 4   Lower Body Dressing Discharge Goal   Discharge Goal 6   Putting On/Taking Off Footwear   Assistance Needed Supervision   Physical Assistance Level No physical assistance   CARE Score - Putting On/Taking Off Footwear 4   Putting On/Taking Off Footwear Discharge Goal   Discharge Goal 6 "   Toileting Hygiene   Assistance Needed Supervision   Physical Assistance Level No physical assistance   CARE Score - Toileting Hygiene 4   Toileting Hygiene Discharge Goal   Discharge Goal 6   Toilet Transfer   Assistance Needed Incidental touching   Physical Assistance Level No physical assistance   CARE Score - Toilet Transfer 4   Toilet Transfer Discharge Goal   Discharge Goal 6   Hearing, Speech, and Vision   Ability to Hear Adequate   Ability to See in Adequate Light Adequate   Expression of Ideas and Wants Without difficulty   Understanding Verbal and Non-Verbal Content Understands   Functional Level of Assist   Upper Body Dressing Stand by Assist   Upper Body Dressing Description Set-up of equipment;Supervision for safety   Lower Body Dressing Standby Assist   Lower Body Dressing Description Set-up of equipment  (seated at EOB for donning jeans w/ belt. CGA for STS from bed using SPC. CGA for standing balance to fasten jeans.)   Bed, Chair, Wheelchair Transfer Contact Guard Assist   Bed Chair Wheelchair Transfer Description Adaptive equipment  (STS from bed using SPC. Functional mobility from bed <> bathroom at CGA for safety using SPC and contralateral hand on wall for additional support.)   Precautions   Precautions Fall Risk   Current Discharge Plan   Current Discharge Plan Return to Prior Living Situation   Benefit    Therapy Benefit Patient Would Benefit from Inpatient Rehab Occupational Therapy to Maximize [place] with ADLs, IADLs and Functional Mobility.   Interdisciplinary Plan of Care Collaboration   IDT Collaboration with  Nursing   Patient Position at End of Therapy In Bed;Bed Alarm On;Call Light within Reach;Tray Table within Reach;Phone within Reach   Collaboration Comments CLOF   OT DME Recommendations   Bathroom Equipment   (TBD based on pt progress)   Additional Equipment   (LHS, reacher)   Strengths & Barriers   Strengths Adequate strength;Alert and oriented;Good insight into  "deficits/needs;Independent prior level of function;Motivated for self care and independence;Pleasant and cooperative;Supportive family;Willingly participates in therapeutic activities   Barriers Impaired activity tolerance;Impaired balance   Occupational Therapist Assigned   Assigned OT / Treatment Time / Comments JS 60/120 min     Pt educated on use of \"call light\" at this time to get in/out of bed/WC to prevent falls. Pt verbalized understanding. OTR placed a reminder on pt's WB to reinforce.      Assessment  Patient is 73 y.o. male with a diagnosis of Left thalamic infarction (HCC).  Additional factors influencing patient status / progress (ie: cognitive factors, co-morbidities, social support, etc): HTN, HLD, Hyponatremia, DM2 with hyperglycemia, and BPH.     Pt was previously independent in all I/ADLs. At time of eval, pt presents at CGA to SBA for morning ADLs. Pt is currently presenting below his baseline for ADLs and would benefit from daily skilled therapy to facilitate improvements in strength, endurance, and balance for safe return to ADLs upon DC.    Plan  Recommend Occupational Therapy  minutes per day 5-7 days per week for 2 weeks for the following treatments:  OT Self Care/ADL, OT Neuro Re-Ed/Balance, OT Therapeutic Activity, and OT Therapeutic Exercise.    *IRF-PRITI shower next session 02/07*    Passport items to be completed:  Perform bathroom transfers, complete dressing, complete feeding, get ready for the day, prepare a simple meal, participate in household tasks, adapt home for safety needs, demonstrate home exercise program, complete caregiver training     Goals:  Long term and short term goals have been discussed with patient and they are in agreement.    Occupational Therapy Goals (Active)       Problem: Bathing       Dates: Start:  02/06/24         Goal: STG-Within one week, patient will bathe       Dates: Start:  02/06/24               Problem: Dressing       Dates: Start:  02/06/24    "      Goal: STG-Within one week, patient will dress UB       Dates: Start:  02/06/24               Problem: Functional Transfers       Dates: Start:  02/06/24         Goal: STG-Within one week, patient will transfer to toilet       Dates: Start:  02/06/24            Goal: STG-Within one week, patient will transfer to tub/shower       Dates: Start:  02/06/24               Problem: IADL's       Dates: Start:  02/06/24         Goal: STG-Within one week, patient will access kitchen area       Dates: Start:  02/06/24               Problem: OT Long Term Goals       Dates: Start:  02/06/24         Goal: LTG-By discharge, patient will complete basic self care tasks       Dates: Start:  02/06/24            Goal: LTG-By discharge, patient will perform bathroom transfers       Dates: Start:  02/06/24            Goal: LTG-By discharge, patient will complete basic home management       Dates: Start:  02/06/24               Problem: Toileting       Dates: Start:  02/06/24         Goal: STG-Within one week, patient will complete toileting tasks       Dates: Start:  02/06/24

## 2024-02-06 NOTE — IDT DISCHARGE PLANNING
CASE MANAGEMENT INITIAL ASSESSMENT    Admit Date:  2/5/2024     CM reviewed the medical chart and will meet with the patient to discuss role of case management / discharge planning / team conference.       Diagnosis: Left sided cerebral hemisphere cerebrovascular accident (CVA) ([facility]) [I63.9]    Co-morbidities:   Patient Active Problem List    Diagnosis Date Noted    Left thalamic infarction ([facility]) 02/05/2024    Left sided cerebral hemisphere cerebrovascular accident (CVA) ([facility]) 02/05/2024    Left Ischemic stroke ([facility]) 02/04/2024    Hypertensive emergency 02/03/2024    Pulmonary hypertension ([facility]) 02/03/2024    Acute on chronic right upper and lower extremity weakness ([facility]) 02/02/2024    Chronic systolic congestive heart failure ([facility]) 02/02/2024    History of cerebrovascular accident 02/02/2024    ACP (advance care planning) 02/02/2024    Onychomycosis 12/19/2023    Hospital discharge follow-up 04/11/2023    Cyanosis of tip of finger 04/11/2023    Gastroenteritis 04/04/2023    Dehydration 04/04/2023    Post herpetic neuralgia 08/20/2022    Ileus ([facility]) 08/19/2022    Herpes zoster 07/26/2022    Medically noncompliant 07/06/2022    Pain with urination 06/30/2022    Acute non-recurrent frontal sinusitis 06/03/2022    Urinary tract infection associated with indwelling urethral catheter ([facility]) 05/31/2022    Ischemic cardiomyopathy 10/24/2021    Cholelithiasis 05/31/2021    Drug-induced neutropenia ([facility]) 11/05/2020    Insomnia due to medical condition 10/26/2020    QT prolongation 09/30/2020    Nephrolithiasis 08/30/2020    History of TIA (transient ischemic attack) 08/20/2020    Constipation 08/15/2020    SLE (systemic lupus erythematosus related syndrome) ([facility]) 07/25/2020    Pancreatic cyst 07/25/2020    Generalized pain 07/01/2020    Encounter for long-term (current) use of insulin ([facility]) 05/06/2019    Chest pain 01/05/2019    Polycythemia vera ([facility]) 01/03/2019    Multiple joint pain 10/03/2018    Vertigo 08/09/2018     Elevated sed rate- R/O PMR 04/04/2018    Orthostatic hypotension 03/29/2018    Stenosis of right carotid artery-Right CEA 3/21/18 03/21/2018    Essential hypertension 03/21/2018    Hypercoagulable state ([facility]) 03/21/2018    History of stroke- old right parietal/occipital 03/10/2018    Leukopenia 03/10/2018    Carotid stenosis, 90% right carotid 03/10/2018    Chronic pain syndrome 09/13/2017    Long term (current) use of anticoagulants [Z79.01] 08/21/2017    History of ST elevation myocardial infarction (STEMI) 03/17/2017    Coagulopathy ([facility]) 03/13/2017    Type 2 diabetes mellitus with hyperglycemia ([facility]) 03/13/2017    Coronary artery disease involving native coronary artery without angina pectoris 03/13/2017    Benign prostatic hyperplasia with urinary retention 03/12/2017    Thrombocytosis 03/12/2017    Dyslipidemia 03/12/2017    Paroxysmal A-fib ([facility]) 03/12/2017    Statin intolerance 03/12/2017    STEMI (ST elevation myocardial infarction) ([facility]) 03/11/2017     Prior Living Situation:  Housing / Facility: [address]  Lives with - Patient's Self Care Capacity: Spouse    Prior Level of Function:  Medication Management: Independent  Finances: Independent  Home Management: Independent  Shopping: Independent  Prior Level Of Mobility: Independent With Device in Community, Independent With Device in Home ([facility])  Driving / Transportation: Driving Independent    Support Systems:  Primary : Alysa Schulte         Previous Services Utilized:   Equipment Owned: Single Point Cane  Prior Services: Home-Independent    Other Information:  Occupation (Pre-Hospital Vocational): Employed Part Time     Primary Payor Source: Medicare A, Medicare B  Primary Care Practitioner : Tala Lucas    Patient / Family Goal:  Patient / Family Goal: Return home.    Plan:  1. Continue to follow patient through hospitalization and provide discharge planning in collaboration with patient, family, physicians and ancillary  services.     2. Utilize community resources to ensure a safe discharge.

## 2024-02-07 NOTE — THERAPY
"Occupational Therapy  Daily Treatment     Patient Name: Francesco Schulte  Age:  73 y.o., Sex:  male  Medical Record #: [id number]  Today's Date: 2/7/2024     Precautions  Precautions: (P) Fall Risk  Comments: (P) Per daughter report, \"underdiagnosed vascular dementia.\" Poor historian.         Subjective    Pt encountered for OT supine in bed. Pleasant and agreeable to participate in showering. Pt reports having a \"complicated\" schedule today. \"I have a very important appointment with Dr. Pink at 11 am today, but nobody seems to know anything about.\" Rehab team and MD informed.      Objective       02/07/24 0901   OT Charge Group   OT Self Care / ADL (Units) 4   OT Total Time Spent   OT Individual Total Time Spent (Mins) 60   Precautions   Precautions Fall Risk   Comments Per daughter report, \"underdiagnosed vascular dementia.\" Poor historian.   Functional Level of Assist   Bathing Contact Guard Assist   Bathing Description Adaptive equipment;Grab bar;Hand rails;Hand held shower;Long handled bath tool;Tub bench;Increased time;Set-up of equipment;Verbal cueing  (CGA for standing balance using GB. In standing, pt able to wash and dry upper and lower body (using LHS).)   Upper Body Dressing Stand by Assist   Upper Body Dressing Description Increased time;Initial preparation for task;Set-up of equipment;Supervision for safety   Lower Body Dressing Supervision   Lower Body Dressing Description Increased time;Set-up of equipment;Supervision for safety;Verbal cueing  (seated at shower chair for threading legs through underwear/pants/socks/ and lace-up shoes. STS using GB for support to pull up pants (CGA))   Bed, Chair, Wheelchair Transfer Contact Guard Assist   Bed Chair Wheelchair Transfer Description Adaptive equipment;Supervision for safety;Verbal cueing  (SPC)   Tub / Shower Transfers Contact Guard Assist   Tub Shower Transfer Description Adaptive equipment;Set-up of equipment;Supervision for safety;Verbal " cueing  (SPC and GB)   Bed Mobility    Supine to Sit Independent   Sit to Supine Independent   Scooting Independent   Rolling Independent   Interdisciplinary Plan of Care Collaboration   IDT Collaboration with  Nursing;Physical Therapist;Physician   Patient Position at End of Therapy In Bed;Bed Alarm On;Call Light within Reach;Tray Table within Reach;Phone within Reach   Collaboration Comments CLOF   OT DME Recommendations   Bathroom Equipment   (shower chair)     MD informed team that daughter is aware of appointment which was not critical. Will reschedule. Per daughter, pt with under diagnosed vascular dementia.     Pt inquired about OTR turning off bed alarm. Pt re-educated on use of call light and that he is not yet cleared to walk to the bathroom w/o another present. Pt verbalized understanding.     Assessment    Pt presented at CGA to SBA for showering and dressing tasks this day demonstrate good mobility for LBD and fair standing balance during dynamic standing. Pt does appear to have poor insight into current level of functional mobility as he does not think he requires a mobility device, but is agreeable to OTR/PT recommendations.       Strengths: Adequate strength, Alert and oriented, Good insight into deficits/needs, Independent prior level of function, Motivated for self care and independence, Pleasant and cooperative, Supportive family, Willingly participates in therapeutic activities  Barriers: Impaired activity tolerance, Impaired balance    Plan    Cont ADLs, functional transfers, neuro re-ed/balance, and thera act/ex to maximize functional recovery for safe DC home.       DME  OT DME Recommendations  Bathroom Equipment: (P)  (shower chair)  Additional Equipment:  (LHS, reacher)    Passport items to be completed:  Perform bathroom transfers, complete dressing, complete feeding, get ready for the day, prepare a simple meal, participate in household tasks, adapt home for safety needs, demonstrate home  exercise program, complete caregiver training     Occupational Therapy Goals (Active)       Problem: Bathing       Dates: Start:  02/06/24         Goal: STG-Within one week, patient will participate in bathing tasks to gather baseline       Dates: Start:  02/06/24               Problem: Dressing       Dates: Start:  02/06/24         Goal: STG-Within one week, patient will dress UB and LB at [facility] using LRD       Dates: Start:  02/06/24               Problem: Functional Transfers       Dates: Start:  02/06/24         Goal: STG-Within one week, patient will transfer to toilet at [facility] to [facility] using LRD       Dates: Start:  02/06/24            Goal: STG-Within one week, patient will transfer to tub/shower at [facility] to [facility] using LRD       Dates: Start:  02/06/24               Problem: IADL's       Dates: Start:  02/06/24         Goal: STG-Within one week, patient will access kitchen area at [facility] to [facility] using LRD       Dates: Start:  02/06/24               Problem: OT Long Term Goals       Dates: Start:  02/06/24         Goal: LTG-By discharge, patient will complete basic self care tasks at [facility] to independent        Dates: Start:  02/06/24            Goal: LTG-By discharge, patient will perform bathroom transfers at [facility] to independent        Dates: Start:  02/06/24            Goal: LTG-By discharge, patient will complete basic home management at [facility] to [facility]        Dates: Start:  02/06/24               Problem: Toileting       Dates: Start:  02/06/24         Goal: STG-Within one week, patient will complete toileting tasks at [facility] to [facility] using LRD       Dates: Start:  02/06/24

## 2024-02-07 NOTE — PROGRESS NOTES
"  Physical Medicine & Rehabilitation Progress Note    Encounter Date: 2/7/2024    Chief Complaint: Decreased mobility, weakness    Interval Events (Subjective):  Patient sitting up in room. Had conversation with daughter where she states he has had dementia for the past 2 years. She reports most of what he says is not reliable as his memory is poor. She reports that he does have an appointment but will be rescheduled. Patient reports he is concerned about missing appointment, discussed about rescheduling and needing to work on safety. Denies pain.     Objective:  VITAL SIGNS: /89   Pulse 72   Temp 36.7 °C (98 °F) (Oral)   Resp 18   Ht 1.854 m (6' 1\")   Wt 80.7 kg (178 lb)   SpO2 95%   BMI 23.48 kg/m²   Gen: NAD  Psych: Mood and affect appropriate  CV: RRR, 0 edema  Resp: CTAB, no upper airway sounds  Abd: NTND  Neuro: AOx4, following commands  Unchanged from 2/6/24    Laboratory Values:  Recent Results (from the past 72 hour(s))   POCT glucose device results    Collection Time: 02/05/24  5:34 AM   Result Value Ref Range    POC Glucose, Blood 100 (H) 65 - 99 mg/dL   POCT glucose device results    Collection Time: 02/05/24  4:58 PM   Result Value Ref Range    POC Glucose, Blood 204 (H) 65 - 99 mg/dL   CBC with Differential    Collection Time: 02/06/24  6:39 AM   Result Value Ref Range    WBC 10.8 4.8 - 10.8 K/uL    RBC 6.65 (H) 4.70 - 6.10 M/uL    Hemoglobin 17.3 14.0 - 18.0 g/dL    Hematocrit 55.7 (H) 42.0 - 52.0 %    MCV 83.8 81.4 - 97.8 fL    MCH 26.0 (L) 27.0 - 33.0 pg    MCHC 31.1 (L) 32.3 - 36.5 g/dL    RDW 70.8 (H) 35.9 - 50.0 fL    Platelet Count 387 164 - 446 K/uL    MPV 10.5 9.0 - 12.9 fL    Neutrophils-Polys 84.10 (H) 44.00 - 72.00 %    Lymphocytes 5.40 (L) 22.00 - 41.00 %    Monocytes 4.50 0.00 - 13.40 %    Eosinophils 4.00 0.00 - 6.90 %    Basophils 1.50 0.00 - 1.80 %    Immature Granulocytes 0.50 0.00 - 0.90 %    Nucleated RBC 0.00 0.00 - 0.20 /100 WBC    Neutrophils (Absolute) 9.09 (H) 1.82 " - 7.42 K/uL    Lymphs (Absolute) 0.58 (L) 1.00 - 4.80 K/uL    Monos (Absolute) 0.49 0.00 - 0.85 K/uL    Eos (Absolute) 0.43 0.00 - 0.51 K/uL    Baso (Absolute) 0.16 (H) 0.00 - 0.12 K/uL    Immature Granulocytes (abs) 0.05 0.00 - 0.11 K/uL    NRBC (Absolute) 0.00 K/uL   Comp Metabolic Panel (CMP)    Collection Time: 02/06/24  6:39 AM   Result Value Ref Range    Sodium 141 135 - 145 mmol/L    Potassium 4.5 3.6 - 5.5 mmol/L    Chloride 106 96 - 112 mmol/L    Co2 25 20 - 33 mmol/L    Anion Gap 10.0 7.0 - 16.0    Glucose 113 (H) 65 - 99 mg/dL    Bun 19 8 - 22 mg/dL    Creatinine 1.00 0.50 - 1.40 mg/dL    Calcium 9.3 8.5 - 10.5 mg/dL    Correct Calcium 9.6 8.5 - 10.5 mg/dL    AST(SGOT) 12 12 - 45 U/L    ALT(SGPT) 11 2 - 50 U/L    Alkaline Phosphatase 73 30 - 99 U/L    Total Bilirubin 0.6 0.1 - 1.5 mg/dL    Albumin 3.6 3.2 - 4.9 g/dL    Total Protein 8.2 6.0 - 8.2 g/dL    Globulin 4.6 (H) 1.9 - 3.5 g/dL    A-G Ratio 0.8 g/dL   HEMOGLOBIN A1C    Collection Time: 02/06/24  6:39 AM   Result Value Ref Range    Glycohemoglobin 5.9 (H) 4.0 - 5.6 %    Est Avg Glucose 123 mg/dL   TSH with Reflex to FT4    Collection Time: 02/06/24  6:39 AM   Result Value Ref Range    TSH 2.740 0.380 - 5.330 uIU/mL   Vitamin D, 25-hydroxy (blood)    Collection Time: 02/06/24  6:39 AM   Result Value Ref Range    25-Hydroxy   Vitamin D 25 27 (L) 30 - 100 ng/mL   ESTIMATED GFR    Collection Time: 02/06/24  6:39 AM   Result Value Ref Range    GFR (CKD-EPI) 80 >60 mL/min/1.73 m 2   POCT glucose device results    Collection Time: 02/06/24  7:42 AM   Result Value Ref Range    POC Glucose, Blood 129 (H) 65 - 99 mg/dL   POCT glucose device results    Collection Time: 02/06/24 11:08 AM   Result Value Ref Range    POC Glucose, Blood 194 (H) 65 - 99 mg/dL   POCT glucose device results    Collection Time: 02/06/24  5:25 PM   Result Value Ref Range    POC Glucose, Blood 127 (H) 65 - 99 mg/dL   POCT glucose device results    Collection Time: 02/07/24  7:28 AM    Result Value Ref Range    POC Glucose, Blood 116 (H) 65 - 99 mg/dL       Medications:  Scheduled Medications   Medication Dose Frequency    [START ON 2/8/2024] amLODIPine  7.5 mg Q DAY    insulin lispro  2-12 Units 4X/DAY ACHS    vitamin D3  1,000 Units DAILY    Pharmacy Consult Request  1 Each PHARMACY TO DOSE    senna-docusate  2 Tablet BID    omeprazole  20 mg DAILY    apixaban  5 mg BID    aspirin  81 mg Q EVENING    finasteride  5 mg QDAY    insulin GLARGINE  10 Units QAM INSULIN    tamsulosin  0.4 mg DAILY     PRN medications: Respiratory Therapy Consult, hydrALAZINE, acetaminophen, senna-docusate **AND** polyethylene glycol/lytes, mag hydrox-al hydrox-simeth, ondansetron **OR** ondansetron, traZODone, sodium chloride, oxyCODONE immediate-release **OR** oxyCODONE immediate-release, [DISCONTINUED] insulin regular **AND** POC blood glucose manual result **AND** NOTIFY MD and PharmD **AND** Administer 20 grams of glucose (approximately 8 ounces of fruit juice) every 15 minutes PRN FSBG less than 70 mg/dL **AND** dextrose bolus, dicyclomine    Diet:  Current Diet Order   Procedures    Diet Order Diet: Cardiac; Second Modifier: (optional): Consistent CHO (Diabetic)       Medical Decision Making and Plan:  L CVA - 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 He has been started on ASA and Eliquis  -PT and OT for mobility and ADLs. Per guidelines, 15 hours per week between PT, OT and/or SLP.  -Follow-up Neurology     HTN/A Fib/sCHF - Patient on Eliquis. Patient on Amlodipine 5 mg daily.  Consult hospitalist. History of low SBP at times. SBP into 150s, per hospitalist increase to 7.5 mg Amlodipine. Will continue     HLD - Patient allergic to statins.      Hyponatremia - Check AM CMP - 141, will monitor     DM2 with hyperglycemia - Patient on Lantus 10 daily and SSI. Consult hospitalist, continue Lantus 10 U and SSI     BPH - Patient on Finasteride 5 mg and Flomax 0.4 mg. Voiding, continue Flomax 0.4 mg and  Finasteride 5 mg daily    Dementia - Per family with dementia for past 2 years. Will monitor     Vitamin D deficiency - 27 on admission, started on 1000 U    Pain - Patient on PRN APAP and Oxycodone     Skin - Patient at risk for skin breakdown due to debility in areas including sacrum, achilles, elbows and head in addition to other sites. Nursing to assess skin daily.      GI Ppx - Patient on Prilosec for GERD prophylaxis. Patient on Senna-docusate for constipation prophylaxis.      DVT Ppx - Patient Eliquis on transfer.  ____________________________________    T. Edouard Starks MD/PhD  ABP - Physical Medicine & Rehabilitation   [facility] - Brain Injury Medicine   ____________________________________

## 2024-02-07 NOTE — PROGRESS NOTES
[facility] Daily Progress Note    Date of Service  2/7/2024    Chief Complaint:  Hypertension  Diabetes    Interval History:  Discussed about his BP elevated and have increased the Norvasc dose.    Review of Systems  Review of Systems   Constitutional:  Negative for fever.   Eyes:  Negative for blurred vision.   Respiratory:  Negative for shortness of breath.    Cardiovascular:  Negative for palpitations.   Gastrointestinal:  Negative for nausea and vomiting.   Neurological:  Negative for dizziness and headaches.   Psychiatric/Behavioral:  Negative for hallucinations.         Physical Exam  Temp:  [36.7 °C (98.1 °F)-36.9 °C (98.4 °F)] 36.9 °C (98.4 °F)  Pulse:  [73-78] 73  Resp:  [18] 18  BP: (135-155)/(89-93) 155/93  SpO2:  [94 %-97 %] 94 %    Physical Exam  Vitals and nursing note reviewed.   Constitutional:       General: He is not in acute distress.  HENT:      Mouth/Throat:      Mouth: Mucous membranes are moist.      Pharynx: Oropharynx is clear.   Eyes:      General: No scleral icterus.  Cardiovascular:      Rate and Rhythm: Normal rate and regular rhythm.      Heart sounds: No murmur heard.  Pulmonary:      Effort: Pulmonary effort is normal.      Breath sounds: Normal breath sounds. No stridor.   Abdominal:      General: There is no distension.      Palpations: Abdomen is soft.      Tenderness: There is no abdominal tenderness.   Musculoskeletal:      Cervical back: No rigidity.      Right lower leg: No edema.      Left lower leg: No edema.   Skin:     General: Skin is warm and dry.      Findings: No rash.   Neurological:      Mental Status: He is alert and oriented to person, place, and time.   Psychiatric:         Mood and Affect: Mood normal.         Behavior: Behavior normal.         Fluids    Intake/Output Summary (Last 24 hours) at 2/7/2024 0936  Last data filed at 2/7/2024 0834  Gross per 24 hour   Intake 960 ml   Output 1000 ml   Net -40 ml       Laboratory  Recent Labs     02/06/24  0666    WBC 10.8   RBC 6.65*   HEMOGLOBIN 17.3   HEMATOCRIT 55.7*   MCV 83.8   MCH 26.0*   MCHC 31.1*   RDW 70.8*   PLATELETCT 387   MPV 10.5     Recent Labs     02/06/24  0639   SODIUM 141   POTASSIUM 4.5   CHLORIDE 106   CO2 25   GLUCOSE 113*   BUN 19   CREATININE 1.00   CALCIUM 9.3                   Imaging    Assessment/Plan  * Left thalamic infarction (HCC)- (present on admission)  Assessment & Plan  Cont ASA  Cont Eliquis    CHF (congestive heart failure) ([facility])  Assessment & Plan  Has hx  Has hx of AICD  Cont Eliquis  Echo (1/25): EF 25%, RVSP 53    Paroxysmal A-fib ([facility])  Assessment & Plan  Has a hx  Last few EKG's showed normal sinus rhythm  Not on any rate controlling meds  Cont Eliquis  Cont to monitor    Vitamin D insufficiency  Assessment & Plan  Vit D: 27  Cont supplements    Essential hypertension- (present on admission)  Assessment & Plan  BP a little labile with -155  Cont Norvasc: 5 mg daily --> will increase to 7.5 mg daily (2/8)  Cont to monitor    Type 2 diabetes mellitus with hyperglycemia (HCC)- (present on admission)  Assessment & Plan  Hba1c: 5.9 (2/6)  BS labile: 100-204  Cont Glargine: 10 units qam  Will change SS coverage to Lispro  Note: home meds includes Tresiba 10 units qd (and often takes less depending on the BS)  Cont to monitor

## 2024-02-07 NOTE — PROGRESS NOTES
"Patient c/o chest pain. Patient states \"The apex of my heart hurts\". Dr. Concepcion was notified. STAT Troponin and pBNP was drawn as ordered. EKG in progress by RT. SURYAS.   "

## 2024-02-07 NOTE — PROGRESS NOTES
NURSING DAILY NOTE    Name: Francesco Schulte   Date of Admission: 2/5/2024   Admitting Diagnosis: Left thalamic infarction (HCC)  Attending Physician: Jorgito Starks M.d.  Allergies: Atorvastatin, Doxycycline, Lisinopril, Simvastatin, Statins [hmg-coa-r inhibitors], Beta adrenergic blockers, Eplerenone, Metformin, and Spironolactone    Safety  Patient Assist  mod  Patient Precautions  Fall Risk  Precaution Comments     Bed Transfer Status  Contact Guard Assist  Toilet Transfer Status      Assistive Devices  Rails, Wheelchair  Oxygen  None - Room Air  Diet/Therapeutic Dining  Current Diet Order   Procedures    Diet Order Diet: Cardiac; Second Modifier: (optional): Consistent CHO (Diabetic)     Pill Administration  whole  Agitated Behavioral Scale     ABS Level of Severity       Fall Risk  Has the patient had a fall this admission?   No  Ghazal Mcelroy Fall Risk Scoring  16, HIGH RISK  Fall Risk Safety Measures  bed alarm and chair alarm    Vitals  Temperature: 36.7 °C (98.1 °F)  Temp src: Oral  Pulse: 78  Respiration: 18  Blood Pressure : 135/89  Blood Pressure MAP (Calculated): 104 MM HG  BP Location: Left, Upper Arm  Patient BP Position: Sitting     Oxygen  Pulse Oximetry: 97 %  O2 (LPM): 0  O2 Delivery Device: None - Room Air    Bowel and Bladder  Last Bowel Movement  02/05/24 (per patient)  Stool Type     Bowel Device   (no BM since admit)  Continent  Bladder: Did not void (no void since admit)   Bowel: No movement (no BM since admit)  Bladder Function  Urine Void (mL): 400 ml  Urine Color: Yellow  Genitourinary Assessment   Bladder Assessment (WDL):  WDL Except  Urine Color: Yellow  Bladder Device: Urinal, Bathroom (400)  $ Bladder Scan Results (mL): 151  Bladder Medications: Yes    Skin  Neal Score   18  Sensory Interventions   Bed Types: Standard/Trauma Mattress  Skin Preventative Measures: Pillows in Use for Support /  Positioning  Moisture Interventions         Pain  Pain Rating Scale  0 - No Pain  Pain Location  Generalized  Pain Location Orientation     Pain Interventions   Declines    ADLs    Bathing      Linen Change      Personal Hygiene     Chlorhexidine Bath      Oral Care     Teeth/Dentures     Shave     Nutrition Percentage Eaten  *  * Meal *  *, Lunch, Between 50-75% Consumed  Environmental Precautions     Patient Turns/Positioning  Patient Turns Self from Side to Side  Patient Turns Assistance/Tolerance     Bed Positions     Head of Bed Elevated         Psychosocial/Neurologic Assessment  Psychosocial Assessment  Psychosocial (WDL):  Within Defined Limits  Neurologic Assessment  Neuro (WDL): Exceptions to WDL  Level of Consciousness: Alert  Orientation Level: Oriented X4  Cognition: Follows commands  Speech: Clear  Pupil Assesment: No  Motor Function/Sensation Assessment: Motor strength, Sensation  RUE Sensation: Tingling, Numbness  Muscle Strength Right Arm: Good Strength Against Gravity and Moderate Resistance  LUE Sensation: Full sensation  Muscle Strength Left Arm: Good Strength Against Gravity and Moderate Resistance  RLE Sensation: Numbness, Tingling  Muscle Strength Right Leg: Good Strength Against Gravity and Moderate Resistance  LLE Sensation: Full sensation  Muscle Strength Left Leg: Good Strength Against Gravity and Moderate Resistance  EENT (WDL):  WDL Except    Cardio/Pulmonary Assessment  Edema      Respiratory Breath Sounds  RUL Breath Sounds: Clear  RML Breath Sounds: Clear  RLL Breath Sounds: Clear  EVELINA Breath Sounds: Clear  LLL Breath Sounds: Clear  Cardiac Assessment   Cardiac (WDL):  WDL Except (hx afib, cardiomyopathy, aicd, cad, stemi, htn, hld, chf)

## 2024-02-07 NOTE — FLOWSHEET NOTE
02/07/24 1319   Patient Events   Interdisciplinary Rounds Attendance at Rounds (30 Min)  (EKG)

## 2024-02-07 NOTE — PROGRESS NOTES
NURSING DAILY NOTE    Name: Francesco Schulte   Date of Admission: 2/5/2024   Admitting Diagnosis: Left thalamic infarction (HCC)  Attending Physician: Jorgito Starks M.d.  Allergies: Atorvastatin, Doxycycline, Lisinopril, Simvastatin, Statins [hmg-coa-r inhibitors], Beta adrenergic blockers, Eplerenone, Metformin, and Spironolactone    Safety  Patient Assist  mod  Patient Precautions  Fall Risk  Precaution Comments     Bed Transfer Status  Contact Guard Assist  Toilet Transfer Status      Assistive Devices  Rails, Wheelchair  Oxygen  None - Room Air  Diet/Therapeutic Dining  Current Diet Order   Procedures    Diet Order Diet: Cardiac; Second Modifier: (optional): Consistent CHO (Diabetic)     Pill Administration  whole  Agitated Behavioral Scale     ABS Level of Severity       Fall Risk  Has the patient had a fall this admission?   No  Ghazal Mcelroy Fall Risk Scoring  20, HIGH RISK  Fall Risk Safety Measures  bed alarm and chair alarm    Vitals  Temperature: 36.7 °C (98.1 °F)  Temp src: Oral  Pulse: 78  Respiration: 18  Blood Pressure : 135/89  Blood Pressure MAP (Calculated): 104 MM HG  BP Location: Left, Upper Arm  Patient BP Position: Sitting     Oxygen  Pulse Oximetry: 97 %  O2 (LPM): 0  O2 Delivery Device: None - Room Air    Bowel and Bladder  Last Bowel Movement  02/06/24  Stool Type  Type 6: Fluffy pieces with ragged edges, a mushy stool  Bowel Device  Bathroom  Continent  Bladder: Did not void (no void since admit)   Bowel: No movement (no BM since admit)  Bladder Function  Urine Void (mL): 400 ml  Urine Color: Unable To Evaluate  Genitourinary Assessment   Bladder Assessment (WDL):  WDL Except  Urine Color: Unable To Evaluate  Bladder Device: Urinal, Bathroom  $ Bladder Scan Results (mL): 151  Bladder Medications: Yes    Skin  Neal Score   18  Sensory Interventions   Bed Types: Standard/Trauma Mattress  Skin Preventative Measures:  Pillows in Use for Support / Positioning  Moisture Interventions         Pain  Pain Rating Scale  4 - Distracts me, can do usual activities  Pain Location  Back  Pain Location Orientation     Pain Interventions   Declines    ADLs    Bathing      Linen Change      Personal Hygiene     Chlorhexidine Bath      Oral Care     Teeth/Dentures     Shave     Nutrition Percentage Eaten  *  * Meal *  *, Lunch, Between 50-75% Consumed  Environmental Precautions     Patient Turns/Positioning  Patient Turns Self from Side to Side  Patient Turns Assistance/Tolerance     Bed Positions     Head of Bed Elevated         Psychosocial/Neurologic Assessment  Psychosocial Assessment  Psychosocial (WDL):  Within Defined Limits  Neurologic Assessment  Neuro (WDL): Exceptions to WDL  Level of Consciousness: Alert  Orientation Level: Oriented X4  Cognition: Follows commands  Speech: Clear  Pupil Assesment: No  Motor Function/Sensation Assessment: Motor strength, Sensation  RUE Sensation: Tingling, Numbness  Muscle Strength Right Arm: Good Strength Against Gravity and Moderate Resistance  LUE Sensation: Full sensation  Muscle Strength Left Arm: Good Strength Against Gravity and Moderate Resistance  RLE Sensation: Numbness, Tingling  Muscle Strength Right Leg: Good Strength Against Gravity and Moderate Resistance  LLE Sensation: Full sensation  Muscle Strength Left Leg: Good Strength Against Gravity and Moderate Resistance  EENT (WDL):  WDL Except    Cardio/Pulmonary Assessment  Edema      Respiratory Breath Sounds  RUL Breath Sounds: Clear  RML Breath Sounds: Clear  RLL Breath Sounds: Clear  EVELINA Breath Sounds: Clear  LLL Breath Sounds: Clear  Cardiac Assessment   Cardiac (WDL):  WDL Except (hx afib, cardiomyopathy, aicd, cad, stemi, htn, hld, chf)

## 2024-02-07 NOTE — THERAPY
Missed Therapy    Patient Name: Francesco Schulte  Age:  73 y.o., Sex:  male  Medical Record #: [id number]  Today's Date: 2/7/2024    Discussed missed therapy with MD, RN and    Pt in process of completion for STAT labs and EKG as for pt is reporting chest pain.        02/07/24 1303   Therapy Missed   Missed Therapy (Minutes) 60   Reason For Missed Therapy Medical - Patient on Hold from Therapy;Medical - Patient not Able To Participate  (STAT EKG and LABS due to reported chest pain, med hold placed.)

## 2024-02-07 NOTE — THERAPY
Physical Therapy   Daily Treatment     Patient Name: Francesco Schulte  Age:  73 y.o., Sex:  male  Medical Record #: [id number]  Today's Date: 2/7/2024     Precautions  Precautions: (P) Fall Risk    Subjective    Patient in bed and agreeable to therapy, requiring extra time to comprehend objectives of session.     Objective       02/07/24 0701   PT Charge Group   PT Gait Training (Units) 1   PT Neuromuscular Re-Education / Balance (Units) 2   PT Therapeutic Activities (Units) 1   PT Total Time Spent   PT Individual Total Time Spent (Mins) 60   Precautions   Precautions Fall Risk   Gait Functional Level of Assist    Gait Level Of Assist Contact Guard Assist   Assistive Device Single Point Cane   Distance (Feet)   (200 ftx1, 50 ftx1)   Deviation   (occasional LOBs, impaired RLE motor control causing decreased foot clearance)   Transfer Functional Level of Assist   Bed, Chair, Wheelchair Transfer Contact Guard Assist   Bed Chair Wheelchair Transfer Description Adaptive equipment;Increased time;Initial preparation for task;Verbal cueing;Supervision for safety  (stand step transfer with SPC)   Toilet Transfers Contact Guard Assist   Toilet Transfer Description Grab bar;Adaptive equipment;Increased time;Supervision for safety;Verbal cueing  (ambulatory approach with grab bar)   Bed Mobility    Supine to Sit Supervised   Sit to Stand Standby Assist  (to CGA)   Scooting Standby Assist   Chou Balance Scale   Sitting Unsupported (Score 0-4) 4   Change Of Positon: Sitting To Standing (Score 0-4) 4   Change Of Positon: Standing To Sitting (Score 0-4) 4   Transfers (Score 0-4) 2   Standing Unsupported (Score 0-4) 3   Standing With Eyes Closed (Score 0-4) 3   Standing With Feet Together (Score 0-4) 3   Tandem Standing (Score 0-4) 3   Standing On One Leg (Score 0-4) 1   Turning Trunk (Feet Fixed) (Score 0-4) 1   Retrieving Objects From Floor (Score 0-4) 3   Turning 360 Degrees (Score 0-4) 1   Stool Stepping (Score 0-4) 1    Reaching Forward While Standing (Score 0-4) 2   Chou Balance Total Score (0-56) 35   Interdisciplinary Plan of Care Collaboration   IDT Collaboration with  Certified Nursing Assistant;Nursing;Occupational Therapist   Patient Position at End of Therapy Seated  (in dining room)   Collaboration Comments PEPPER   Physical Therapist Assigned   Assigned PT / Treatment Time / Comments Aggie primary; no 7am therapy please     Toileting at standing/ambulatory level, including LB dressing and standing hand hygiene with SBA-CGA.    Assessment    Patient tolerated session well, scoring as high fall risk on Chou Balance Scale. Impaired insight into deficits, tendency to perseverate on using cane in right hand vs. Left.    Strengths: Willingly participates in therapeutic activities, Motivated for self care and independence  Barriers: Dementia, Impaired balance, Impaired activity tolerance, Fatigue (right foot drag with fatigue)    Plan    Standing balance, general strengthening/conditioning, gait training with SPC, stair/curb negotiation.    DME  PT DME Recommendations  Additional Equipment:  (LHS, reacher)    Passport items to be completed:  Get in/out of bed safely, in/out of a vehicle, safely use mobility device, walk or wheel around home/community, navigate up and down stairs, show how to get up/down from the ground, ensure home is accessible, demonstrate HEP, complete caregiver training    Physical Therapy Problems (Active)       Problem: Mobility       Dates: Start:  02/06/24         Goal: STG-Within one week, patient will ambulate community distances 200ft x 2 with left SPC SPV       Dates: Start:  02/06/24            Goal: STG-Within one week, patient will ascend and descend four to six stairs no rails with step over patterning SBA       Dates: Start:  02/06/24               Problem: Mobility Transfers       Dates: Start:  02/06/24         Goal: STG-Within one week, patient will transfer bed to chair SPV SPT       Dates:  Start:  02/06/24               Problem: PT-Long Term Goals       Dates: Start:  02/06/24         Goal: LTG-By discharge, patient will ambulate 400ft left SPC mod I level surfaces, SPV outdoors       Dates: Start:  02/06/24            Goal: LTG-By discharge, patient will transfer one surface to another mod I SPT safely and consistently       Dates: Start:  02/06/24            Goal: LTG-By discharge, patient will perform home exercise program seated/standing for LE strengthening to be done 3x/day in safe, independent home environment       Dates: Start:  02/06/24            Goal: LTG-By discharge, patient will up/down curb step to simulate home entrance with SPC with SPV       Dates: Start:  02/06/24

## 2024-02-07 NOTE — CARE PLAN
Problem: Bowel Elimination  Goal: Patient will participate in bowel management program  Note: Pt refused scheduled senna at hs.Continent of bowel per report.LBM 2/6.Will continue to monitor.     Problem: Diabetes Management  Goal: Patient's ability to maintain appropriate glucose levels will be maintained or improve  Note: Pt refused blood sugar check at hs.Education given on the importance of procedure to no vail.Will continue to monitor and assess for s/sx of hypo/hyperglycemia.

## 2024-02-07 NOTE — DIETARY
[employer] [facility] - Alert received for newly identified wound: B/L sacrum pressure injury, R foot pressure pressure injury, L foot wound. Wound team consult pending, will await wound staging to make recommendations if appropriate. RD will monitor per dept policy.

## 2024-02-07 NOTE — CARE PLAN
Problem: Discharge Barriers/Planning  Goal: Patient's continuum of care needs are met  Note: Troponin 76, BNP 3703 Dr. Concepcion was notified.      The patient is Watcher - Medium risk of patient condition declining or worsening

## 2024-02-07 NOTE — CARE PLAN
Problem: Problem Solving STGs  Goal: STG-Within one week, patient will complete functional medication and financial management tasks with 80% accuracy with MIN cues.  Outcome: Not Met     Problem: Memory STGs  Goal: STG-Within one week, patient will implement functional memory strategies with 80% accuracy provided MIN cues.  Outcome: Not Met

## 2024-02-07 NOTE — WOUND TEAM
[facility]  Inpatient Services  Wound and Skin Care Brief Evaluation    Admission Date: 2/5/2024     Last order of IP CONSULT TO WOUND CARE was found on 2/7/2024 from Hospital Encounter on 2/5/2024     HPI, PMH, SH: Reviewed    No chief complaint on file.    Diagnosis: Left sided cerebral hemisphere cerebrovascular accident (CVA) (HCC) [I63.9]    Unit where seen by Wound Team: RH05/01     Wound consult placed regarding Buttocks, left heel and toes, right heel and toes. Chart and images reviewed. This discussed with bedside RN Afua. This clinician in to assess patient. Patient pleasant and agreeable. Pt found to have diabetic ulcers on his left lateral heel, suspect deeper wound bed with possible connection between the two. Need for further assesment and debridement from wound care. Non-selectively debrided with Wound cleanser and Gauze.     No pressure injuries or advanced wound care needs identified. Wound consult completed. No further follow up unless indicated and consulted.     Wound 02/02/24 Pressure Injury Sacrum Bilateral (Active)   Date First Assessed/Time First Assessed: 02/02/24 2256   Present on Original Admission: Yes  Primary Wound Type: Pressure Injury  Location: Sacrum  Laterality: Bilateral      Assessments 2/7/2024  2:00 PM   Wound Image         Wound 02/02/24 Pressure Injury Foot Right DTI (Active)   Date First Assessed/Time First Assessed: 02/02/24 2257   Present on Original Admission: Yes  Primary Wound Type: Pressure Injury  Location: Foot  Laterality: Right  Wound Description (Comments): DTI      Assessments 2/7/2024  2:00 PM   Wound Image         PREVENTATIVE INTERVENTIONS:    Q shift Neal - performed per nursing policy  Q shift pressure point assessments - performed per nursing policy    Surface/Positioning  Waffle overlay  - Currently in Place  Moon boots - Currently in Place    Mobilization      Ambulating at Baseline

## 2024-02-08 PROBLEM — F03.90 DEMENTIA (HCC): Status: ACTIVE | Noted: 2024-01-01

## 2024-02-08 PROBLEM — R07.9 CHEST PAIN: Status: ACTIVE | Noted: 2024-01-01

## 2024-02-08 NOTE — PROGRESS NOTES
"Pt. c/o 4/10 chest pain. Patient states \"Because my cancer, I know my spleen is pressing my heart\" /88, HR 69 O2 96% r.a. Oxycodone 5mg given as requested per patient. Dr. Concepcion was notified.   "

## 2024-02-08 NOTE — CARE PLAN
Problem: Toileting  Goal: STG-Within one week, patient will complete toileting tasks at SBA to SPV using LRD  Outcome: Not Met     Problem: Functional Transfers  Goal: STG-Within one week, patient will transfer to toilet at SBA to SPV using LRD  Outcome: Not Met  Goal: STG-Within one week, patient will transfer to tub/shower at SBA to SPV using LRD  Outcome: Not Met     Problem: Dressing  Goal: STG-Within one week, patient will dress UB and LB at SPV using LRD  Outcome: Progressing     Problem: Bathing  Goal: STG-Within one week, patient will participate in bathing tasks to gather baseline  Outcome: Met

## 2024-02-08 NOTE — CARE PLAN
Problem: Mobility  Goal: STG-Within one week, patient will ambulate community distances 200ft x 2 with left SPC SPV  Outcome: Progressing  Goal: STG-Within one week, patient will ascend and descend four to six stairs no rails with step over patterning SBA  Outcome: Progressing     Problem: Mobility Transfers  Goal: STG-Within one week, patient will transfer bed to chair SPV SPT  Outcome: Progressing

## 2024-02-08 NOTE — PROGRESS NOTES
"                                                         NURSING DAILY NOTE    Name: Francesco Schulte   Date of Admission: 2/5/2024   Admitting Diagnosis: Left thalamic infarction (HCC)  Attending Physician: Jorgito Starks M.d.  Allergies: Atorvastatin, Doxycycline, Lisinopril, Simvastatin, Statins [hmg-coa-r inhibitors], Beta adrenergic blockers, Eplerenone, Metformin, and Spironolactone    Safety  Patient Assist  mod  Patient Precautions  Fall Risk  Precaution Comments  Per daughter report, \"underdiagnosed vascular dementia.\" Poor historian.  Bed Transfer Status  Contact Guard Assist  Toilet Transfer Status   Contact Guard Assist  Assistive Devices  Rails, Wheelchair  Oxygen  None - Room Air  Diet/Therapeutic Dining  Current Diet Order   Procedures    Diet Order Diet: Cardiac; Second Modifier: (optional): Consistent CHO (Diabetic)     Pill Administration  whole  Agitated Behavioral Scale     ABS Level of Severity       Fall Risk  Has the patient had a fall this admission?   No  Ghazal Mcelroy Fall Risk Scoring  20, HIGH RISK  Fall Risk Safety Measures  bed alarm and chair alarm    Vitals  Temperature: 36.7 °C (98 °F)  Temp src: Oral  Pulse: 80  Respiration: 18  Blood Pressure : 130/80  Blood Pressure MAP (Calculated): 97 MM HG  BP Location: Right, Upper Arm  Patient BP Position: Supine     Oxygen  Pulse Oximetry: 95 %  O2 (LPM): 0  O2 Delivery Device: None - Room Air    Bowel and Bladder  Last Bowel Movement  02/06/24  Stool Type  Type 6: Fluffy pieces with ragged edges, a mushy stool  Bowel Device  Bathroom  Continent  Bladder: Did not void (no void since admit)   Bowel: No movement (no BM since admit)  Bladder Function  Urine Void (mL): 300 ml  Urine Color: Unable To Evaluate  Genitourinary Assessment   Bladder Assessment (WDL):  WDL Except  Urine Color: Unable To Evaluate  Bladder Device: Bathroom  $ Bladder Scan Results (mL): 151  Bladder Medications: Yes    Skin  Neal Score   18  Sensory " Interventions   Bed Types: Standard/Trauma Mattress  Skin Preventative Measures: Pillows in Use for Support / Positioning  Moisture Interventions         Pain  Pain Rating Scale  4 - Distracts me, can do usual activities  Pain Location  Chest, Flank  Pain Location Orientation  Left  Pain Interventions   Declines    ADLs    Bathing      Linen Change      Personal Hygiene     Chlorhexidine Bath      Oral Care     Teeth/Dentures     Shave     Nutrition Percentage Eaten  *  * Meal *  *, Breakfast, Between % Consumed  Environmental Precautions     Patient Turns/Positioning  Patient Turns Self from Side to Side  Patient Turns Assistance/Tolerance     Bed Positions     Head of Bed Elevated         Psychosocial/Neurologic Assessment  Psychosocial Assessment  Psychosocial (WDL):  Within Defined Limits  Neurologic Assessment  Neuro (WDL): Exceptions to WDL  Level of Consciousness: Alert  Orientation Level: Oriented X4  Cognition: Follows commands  Speech: Clear  Pupil Assesment: No  Motor Function/Sensation Assessment: Motor strength, Sensation  RUE Sensation: Tingling, Numbness  Muscle Strength Right Arm: Good Strength Against Gravity and Moderate Resistance  LUE Sensation: Full sensation  Muscle Strength Left Arm: Good Strength Against Gravity and Moderate Resistance  RLE Sensation: Numbness, Tingling  Muscle Strength Right Leg: Good Strength Against Gravity and Moderate Resistance  LLE Sensation: Full sensation  Muscle Strength Left Leg: Good Strength Against Gravity and Moderate Resistance  EENT (WDL):  WDL Except    Cardio/Pulmonary Assessment  Edema      Respiratory Breath Sounds  RUL Breath Sounds: Clear  RML Breath Sounds: Clear  RLL Breath Sounds: Clear  EVELINA Breath Sounds: Clear  LLL Breath Sounds: Clear  Cardiac Assessment   Cardiac (WDL):  WDL Except (hx afib, cardiomyopathy, aicd, cad, stemi, htn, hld, chf)

## 2024-02-08 NOTE — CARE PLAN
"The patient is Stable - Low risk of patient condition declining or worsening    Patient is not progressing towards the following goals:    Problem: Fall Risk - Rehab  Goal: Patient will remain free from falls  Outcome: Not Met  Note: Ghazal Mcelroy Fall risk Assessment Score: 15    High fall risk Interventions   - Alarming seatbelt  - Bed and strip alarm   - Yellow sign by the door   - Yellow wrist band \"Fall risk\"  - Do not leave patient unattended in the bathroom  - Fall risk education provided     "

## 2024-02-08 NOTE — THERAPY
"Physical Therapy   Daily Treatment     Patient Name: Francesco Schulte  Age:  73 y.o., Sex:  male  Medical Record #: [id number]  Today's Date: 2/8/2024     Precautions  Precautions: (P) Fall Risk  Comments: (P) Per daughter report, \"underdiagnosed vascular dementia.\" Poor historian.    Subjective    Patient in bed, reporting headache however agreeable to therapy.      Objective       02/08/24 0831   PT Charge Group   PT Gait Training (Units) 1   PT Therapeutic Activities (Units) 1   PT Total Time Spent   PT Individual Total Time Spent (Mins) 30   Precautions   Precautions Fall Risk   Comments Per daughter report, \"underdiagnosed vascular dementia.\" Poor historian.   Pain 0 - 10 Group   Location Head   Location Orientation Anterior   Gait Functional Level of Assist    Gait Level Of Assist Contact Guard Assist   Assistive Device Single Point Cane   Distance (Feet) 200   # of Times Distance was Traveled 2   Deviation   (occasional LOBs, impaired RLE motor control causing decreased foot clearance)   Transfer Functional Level of Assist   Bed, Chair, Wheelchair Transfer Contact Guard Assist   Bed Chair Wheelchair Transfer Description Adaptive equipment;Increased time;Initial preparation for task;Supervision for safety;Verbal cueing  (stand step transfer with SPC)   Bed Mobility    Supine to Sit Supervised   Sit to Supine Supervised   Sit to Stand Standby Assist  (to CGA)   Scooting Supervised   Rolling Independent   Interdisciplinary Plan of Care Collaboration   IDT Collaboration with  Speech Therapist   Patient Position at End of Therapy Seated  (with SLP)   Collaboration Comments handoff of care to SLP     Supine SKTC and DKTC x3 minutes total.    Standing dynamic balance with no UE support and SBA-CGA:   - Staggered stance with anterior leg on 6\" step 2x30 seconds each leg leading   - Staggered sit<>stands with BUEs holding 6# dumbbell 1x5 each leg leading    Assessment    Patient tolerated session well, requesting " to work on improving tandem balance. Demonstrating appropriate balance reactions during interventions.    Strengths: Willingly participates in therapeutic activities, Motivated for self care and independence  Barriers: Dementia, Impaired balance, Impaired activity tolerance, Fatigue (right foot drag with fatigue)    Plan    Standing balance, general strengthening/conditioning, gait training with SPC, stair/curb negotiation.     DME  PT DME Recommendations  Additional Equipment:  (LHS, reacher)     Passport items to be completed:  Get in/out of bed safely, in/out of a vehicle, safely use mobility device, walk or wheel around home/community, navigate up and down stairs, show how to get up/down from the ground, ensure home is accessible, demonstrate HEP, complete caregiver training    Physical Therapy Problems (Active)       Problem: Mobility       Dates: Start:  02/06/24         Goal: STG-Within one week, patient will ambulate community distances 200ft x 2 with left SPC SPV       Dates: Start:  02/06/24            Goal: STG-Within one week, patient will ascend and descend four to six stairs no rails with step over patterning SBA       Dates: Start:  02/06/24               Problem: Mobility Transfers       Dates: Start:  02/06/24         Goal: STG-Within one week, patient will transfer bed to chair SPV SPT       Dates: Start:  02/06/24               Problem: PT-Long Term Goals       Dates: Start:  02/06/24         Goal: LTG-By discharge, patient will ambulate 400ft left SPC mod I level surfaces, SPV outdoors       Dates: Start:  02/06/24            Goal: LTG-By discharge, patient will transfer one surface to another mod I SPT safely and consistently       Dates: Start:  02/06/24            Goal: LTG-By discharge, patient will perform home exercise program seated/standing for LE strengthening to be done 3x/day in safe, independent home environment       Dates: Start:  02/06/24            Goal: LTG-By discharge, patient  will up/down curb step to simulate home entrance with SPC with SPV       Dates: Start:  02/06/24

## 2024-02-08 NOTE — ASSESSMENT & PLAN NOTE
The ASCVD Risk score (Emily QUIÑONES, et al., 2019) failed to calculate for the following reasons:    The patient has a prior MI or stroke diagnosis

## 2024-02-08 NOTE — PROGRESS NOTES
[facility] Medicine Daily Progress Note    Date of Service  2/8/2024    Chief Complaint:  Hypertension  Diabetes    Interval History:  Discussed about his CP workup being ok.    Review of Systems  Review of Systems   Constitutional:  Negative for fever.   Eyes:  Negative for blurred vision.   Respiratory:  Negative for cough.    Cardiovascular:  Negative for chest pain.   Gastrointestinal:  Negative for diarrhea.   Musculoskeletal:  Negative for joint pain.   Neurological:  Negative for dizziness.   Psychiatric/Behavioral:  The patient is not nervous/anxious.         Physical Exam  Temp:  [36.3 °C (97.4 °F)-36.7 °C (98 °F)] 36.3 °C (97.4 °F)  Pulse:  [67-82] 69  Resp:  [18] 18  BP: (125-153)/(80-89) 140/80  SpO2:  [94 %-96 %] 96 %    Physical Exam  Vitals and nursing note reviewed.   Constitutional:       Appearance: He is not diaphoretic.   HENT:      Mouth/Throat:      Pharynx: No oropharyngeal exudate or posterior oropharyngeal erythema.   Eyes:      Extraocular Movements: Extraocular movements intact.   Cardiovascular:      Rate and Rhythm: Normal rate and regular rhythm.      Heart sounds: No murmur heard.  Pulmonary:      Effort: Pulmonary effort is normal.      Breath sounds: Normal breath sounds. No stridor.   Abdominal:      General: Bowel sounds are normal.      Palpations: Abdomen is soft.   Musculoskeletal:      Right lower leg: No edema.      Left lower leg: No edema.   Skin:     General: Skin is warm and dry.      Findings: No rash.   Neurological:      Mental Status: He is alert and oriented to person, place, and time.      Comments: Has dementia and fabricates.   Psychiatric:         Mood and Affect: Mood normal.         Behavior: Behavior normal.         Fluids    Intake/Output Summary (Last 24 hours) at 2/8/2024 0940  Last data filed at 2/8/2024 0611  Gross per 24 hour   Intake 600 ml   Output 1400 ml   Net -800 ml         Laboratory  Recent Labs     02/06/24  0639   WBC 10.8   RBC 6.65*   HEMOGLOBIN  17.3   HEMATOCRIT 55.7*   MCV 83.8   MCH 26.0*   MCHC 31.1*   RDW 70.8*   PLATELETCT 387   MPV 10.5       Recent Labs     02/06/24  0639   SODIUM 141   POTASSIUM 4.5   CHLORIDE 106   CO2 25   GLUCOSE 113*   BUN 19   CREATININE 1.00   CALCIUM 9.3                     Imaging    Assessment/Plan  * Left thalamic infarction (HCC)- (present on admission)  Assessment & Plan  Cont ASA  Cont Eliquis    Dementia ([facility])  Assessment & Plan  Has had dementia for about 2 years  Pt fabricates things    CHF (congestive heart failure) ([facility])  Assessment & Plan  Has hx  Has hx of AICD  BNP: 3537 (4/2023) --> 3703 (2/2024)  Cont Eliquis  Echo (1/25): EF 25%, RVSP 53    Paroxysmal A-fib ([facility])  Assessment & Plan  HR ok  Last few EKG's showed normal sinus rhythm  Not on any rate controlling meds  Cont Eliquis  Cont to monitor    Vitamin D insufficiency  Assessment & Plan  Vit D: 27  Cont supplements    Chest pain- (present on admission)  Assessment & Plan  Had CP on 2/7  Trop: 79 (within baseline recently)  BNP: 3537 (4/2023) --> 3703 (2/8/2024)  EKG: showed SR; no ST elevations or depression  Has hx of on & off CP    Had recent CP at [facility]:  Trops were elevated up to 83 and returned to 82  He reported ongoing chest pain  Case was discussed with on-call Cardiologist Dr. Nadeem Silver confirmed patient has an LBBB but no STEMI, there was no warrant for any angiography  Patient's troponinemia is from demand ischemia     Essential hypertension- (present on admission)  Assessment & Plan  BP a little labile and elevated  Cont Norvasc: 5 mg daily --> 7.5 mg daily (2/8)  Will monitor another day since med s were recently adjusted    Type 2 diabetes mellitus with hyperglycemia (HCC)- (present on admission)  Assessment & Plan  Hba1c: 5.9 (2/6)  BS a little labile but ok  Cont Glargine: 10 units qam  Note: home meds includes Tresiba 10 units qd (and often takes less depending on the BS)  Cont to monitor

## 2024-02-08 NOTE — THERAPY
"Speech Language Pathology  Daily Treatment     Patient Name: Francesco Schulte  Age:  73 y.o., Sex:  male  Medical Record #: [id number]  Today's Date: 2/8/2024     Precautions  Precautions: (P) Fall Risk  Comments: (P) Per daughter report, \"underdiagnosed vascular dementia.\" Poor historian.    Subjective    Pt in bed upon arrival. Pt ambulated to SLP office with cane and contact guard assist by SLP.      Objective       02/08/24 0903   Treatment Charges   SLP Cognitive Skill Development First 15 Minutes 1   SLP Cognitive Skill Development Additional 15 Minutes 3   SLP Total Time Spent   SLP Individual Total Time Spent (Mins) 60         Assessment    Pt completed medication log with SLP acting as scribe, pt able to name purpose for approximately 50% of prescribed medications. Memory log attempted at this time, however, pt stated that he will not use it, SLP educated pt on purpose of memory log task and pt continued to refuse to participate. Pt presented with medication sort error identification task, pt had difficulties understanding verbal directions regarding how to perform task. Pt required direct modeling, after direct modeling and breakdown of task, pt completed task with 100% accuracy with MIN cueing to verify directions. Pt education provided on current deficits due to stroke. Pt resistant to therapy participation and requires MAX encouragement for participation in tasks. Pt presents with confabulations, pt stated that he has 8 conferences to attend this evening. Pt highly verbose and difficult to redirect to therapy tasks.     Strengths: Able to follow instructions, Alert and oriented, Effective communication skills, Independent prior level of function, Willingly participates in therapeutic activities, Supportive family  Barriers: Impaired functional cognition    Plan    Initiate functional medication task (pill sort).     Speech Therapy Problems (Active)       Problem: Memory STGs       Dates: Start:  " 02/06/24         Goal: STG-Within one week, patient will implement functional memory strategies with 80% accuracy provided MIN cues.       Dates: Start:  02/06/24               Problem: Problem Solving STGs       Dates: Start:  02/06/24         Goal: STG-Within one week, patient will complete functional medication and financial management tasks with 80% accuracy with MIN cues.       Dates: Start:  02/06/24               Problem: Speech/Swallowing LTGs       Dates: Start:  02/06/24         Goal: LTG-By discharge, patient will complete functional problem solving and recall information with 80% accuracy provided MOD I.       Dates: Start:  02/06/24

## 2024-02-08 NOTE — PROGRESS NOTES
Physical Medicine & Rehabilitation Progress Note    Encounter Date: 2/8/2024    Chief Complaint: Decreased mobility, weakness    Interval Events (Subjective):  Patient sitting up in room. He reports therapy is going OK. He does not remember our conversation from yesterday about having discharge meeting today. He is still wanting to get out of the hospital ASAP. Discussed would have our meeting later today to discuss safety. Had an episode of chest pain yesterday, EKG wnl.     _____________________________________  Interdisciplinary Team Conference   Most recent IDT on 2/8/2024    I, Jorgito Starks M.D./Ph.D., was present and led the interdisciplinary team conference on 2/8/2024.  I led the IDT conference and agree with the IDT conference documentation and plan of care as noted below.     Nursing:  Diet Current Diet Order   Procedures    Diet Order Diet: Cardiac; Second Modifier: (optional): Consistent CHO (Diabetic)       Eating ADL Independent      % of Last Meal  Oral Nutrition: Dinner, Less than 25% Consumed   Sleep    Bowel Last BM: 02/06/24   Bladder    Barriers to Discharge Home:  Chest pain - work - up ; left flank pain  Diabetic ulcer    Physical Therapy:  Bed Mobility    Transfers Contact Guard Assist  Adaptive equipment, Increased time, Initial preparation for task, Supervision for safety, Verbal cueing (stand step transfer with SPC)   Mobility Contact Guard Assist   Stairs CGA   Barriers to Discharge Home:  35 of 54 on balance exam  Cognition    Occupational Therapy:  Grooming     Bathing Contact Guard Assist   UB Dressing Stand by Assist   LB Dressing Supervision   Toileting     Shower & Transfer CGA   Barriers to Discharge Home:  Confused    Speech-Language Pathology:  Comprehension:  Modified Independent  Comprehension Description:  Glasses  Expression:  Modified Independent  Expression Description:  Verbal cueing  Social Interaction:  Independent  Social Interaction Description:  Increased  "time, Verbal cues  Problem Solving:  Moderate Assist  Problem Solving Description:  Increased time, Supervision, Therapy schedule, Verbal cueing  Memory:  Total Assist  Memory Description:  Increased time, Medication, Therapy schedule, Verbal cueing, Supervision  Barriers to Discharge Home:  Needs supervision    Respiratory Therapy:  O2 (LPM): 0  O2 Delivery Device: None - Room Air    Case Management:  Continues to work on disposition and DME needs.      Discharge Date/Disposition:  2/19/24  _____________________________________      Objective:  VITAL SIGNS: BP (!) 140/80   Pulse 69   Temp 36.3 °C (97.4 °F) (Oral)   Resp 18   Ht 1.854 m (6' 1\")   Wt 80.7 kg (178 lb)   SpO2 96%   BMI 23.48 kg/m²   Gen: NAD  Psych: Mood and affect appropriate  CV: RRR, 0 edema  Resp: CTAB, no upper airway sounds  Abd: NTND  Neuro: AOx3, following commands, 4/5 RLE    Laboratory Values:  Recent Results (from the past 72 hour(s))   POCT glucose device results    Collection Time: 02/05/24  4:58 PM   Result Value Ref Range    POC Glucose, Blood 204 (H) 65 - 99 mg/dL   CBC with Differential    Collection Time: 02/06/24  6:39 AM   Result Value Ref Range    WBC 10.8 4.8 - 10.8 K/uL    RBC 6.65 (H) 4.70 - 6.10 M/uL    Hemoglobin 17.3 14.0 - 18.0 g/dL    Hematocrit 55.7 (H) 42.0 - 52.0 %    MCV 83.8 81.4 - 97.8 fL    MCH 26.0 (L) 27.0 - 33.0 pg    MCHC 31.1 (L) 32.3 - 36.5 g/dL    RDW 70.8 (H) 35.9 - 50.0 fL    Platelet Count 387 164 - 446 K/uL    MPV 10.5 9.0 - 12.9 fL    Neutrophils-Polys 84.10 (H) 44.00 - 72.00 %    Lymphocytes 5.40 (L) 22.00 - 41.00 %    Monocytes 4.50 0.00 - 13.40 %    Eosinophils 4.00 0.00 - 6.90 %    Basophils 1.50 0.00 - 1.80 %    Immature Granulocytes 0.50 0.00 - 0.90 %    Nucleated RBC 0.00 0.00 - 0.20 /100 WBC    Neutrophils (Absolute) 9.09 (H) 1.82 - 7.42 K/uL    Lymphs (Absolute) 0.58 (L) 1.00 - 4.80 K/uL    Monos (Absolute) 0.49 0.00 - 0.85 K/uL    Eos (Absolute) 0.43 0.00 - 0.51 K/uL    Baso (Absolute) " 0.16 (H) 0.00 - 0.12 K/uL    Immature Granulocytes (abs) 0.05 0.00 - 0.11 K/uL    NRBC (Absolute) 0.00 K/uL   Comp Metabolic Panel (CMP)    Collection Time: 02/06/24  6:39 AM   Result Value Ref Range    Sodium 141 135 - 145 mmol/L    Potassium 4.5 3.6 - 5.5 mmol/L    Chloride 106 96 - 112 mmol/L    Co2 25 20 - 33 mmol/L    Anion Gap 10.0 7.0 - 16.0    Glucose 113 (H) 65 - 99 mg/dL    Bun 19 8 - 22 mg/dL    Creatinine 1.00 0.50 - 1.40 mg/dL    Calcium 9.3 8.5 - 10.5 mg/dL    Correct Calcium 9.6 8.5 - 10.5 mg/dL    AST(SGOT) 12 12 - 45 U/L    ALT(SGPT) 11 2 - 50 U/L    Alkaline Phosphatase 73 30 - 99 U/L    Total Bilirubin 0.6 0.1 - 1.5 mg/dL    Albumin 3.6 3.2 - 4.9 g/dL    Total Protein 8.2 6.0 - 8.2 g/dL    Globulin 4.6 (H) 1.9 - 3.5 g/dL    A-G Ratio 0.8 g/dL   HEMOGLOBIN A1C    Collection Time: 02/06/24  6:39 AM   Result Value Ref Range    Glycohemoglobin 5.9 (H) 4.0 - 5.6 %    Est Avg Glucose 123 mg/dL   TSH with Reflex to FT4    Collection Time: 02/06/24  6:39 AM   Result Value Ref Range    TSH 2.740 0.380 - 5.330 uIU/mL   Vitamin D, 25-hydroxy (blood)    Collection Time: 02/06/24  6:39 AM   Result Value Ref Range    25-Hydroxy   Vitamin D 25 27 (L) 30 - 100 ng/mL   ESTIMATED GFR    Collection Time: 02/06/24  6:39 AM   Result Value Ref Range    GFR (CKD-EPI) 80 >60 mL/min/1.73 m 2   POCT glucose device results    Collection Time: 02/06/24  7:42 AM   Result Value Ref Range    POC Glucose, Blood 129 (H) 65 - 99 mg/dL   POCT glucose device results    Collection Time: 02/06/24 11:08 AM   Result Value Ref Range    POC Glucose, Blood 194 (H) 65 - 99 mg/dL   POCT glucose device results    Collection Time: 02/06/24  5:25 PM   Result Value Ref Range    POC Glucose, Blood 127 (H) 65 - 99 mg/dL   POCT glucose device results    Collection Time: 02/07/24  7:28 AM   Result Value Ref Range    POC Glucose, Blood 116 (H) 65 - 99 mg/dL   POCT glucose device results    Collection Time: 02/07/24 11:24 AM   Result Value Ref Range     POC Glucose, Blood 213 (H) 65 - 99 mg/dL   TROPONIN    Collection Time: 24  1:12 PM   Result Value Ref Range    Troponin T 76 (H) 6 - 19 ng/L   proBrain Natriuretic Peptide, NT    Collection Time: 24  1:12 PM   Result Value Ref Range    NT-proBNP 3703 (H) 0 - 125 pg/mL   EKG    Collection Time: 24  1:19 PM   Result Value Ref Range    Report       [facility]    Test Date:  2024  Pt Name:    MARZENA ROMEO             Department: [facility]  MRN:        [id number]                      Room:       [facility]  Gender:     Male                         Technician: [id number]  :        1950                   Requested By:TOMASZ DUNNE  Order #:    [id number]                    Reading MD: Darien Adams MD    Measurements  Intervals                                Axis  Rate:       77                           P:          26  CO:         232                          QRS:        -50  QRSD:       104                          T:          149  QT:         419  QTc:        475    Interpretive Statements  Sinus rhythm  Prolonged CO interval  Left atrial enlargement  LVH with secondary repolarization abnormality  Anterior infarct, old    Electronically Signed On 2024 14:32:12 PST by Darien Adams MD     POCT glucose device results    Collection Time: 24  5:26 PM   Result Value Ref Range    POC Glucose, Blood 104 (H) 65 - 99 mg/dL   POCT glucose device results    Collection Time: 24  8:36 PM   Result Value Ref Range    POC Glucose, Blood 138 (H) 65 - 99 mg/dL       Medications:  Scheduled Medications   Medication Dose Frequency    amLODIPine  7.5 mg Q DAY    insulin lispro  2-12 Units 4X/DAY ACHS    vitamin D3  1,000 Units DAILY    senna-docusate  2 Tablet BID    omeprazole  20 mg DAILY    apixaban  5 mg BID    aspirin  81 mg Q EVENING    finasteride  5 mg QDAY    insulin GLARGINE  10 Units QAM INSULIN    tamsulosin  0.4 mg DAILY     PRN medications: Respiratory Therapy Consult, hydrALAZINE,  acetaminophen, senna-docusate **AND** polyethylene glycol/lytes, mag hydrox-al hydrox-simeth, ondansetron **OR** ondansetron, traZODone, sodium chloride, oxyCODONE immediate-release **OR** oxyCODONE immediate-release, [DISCONTINUED] insulin regular **AND** POC blood glucose manual result **AND** NOTIFY MD and PharmD **AND** Administer 20 grams of glucose (approximately 8 ounces of fruit juice) every 15 minutes PRN FSBG less than 70 mg/dL **AND** dextrose bolus, dicyclomine    Diet:  Current Diet Order   Procedures    Diet Order Diet: Cardiac; Second Modifier: (optional): Consistent CHO (Diabetic)       Medical Decision Making and Plan:  L CVA - 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 He has been started on ASA and Eliquis  -PT and OT for mobility and ADLs. Per guidelines, 15 hours per week between PT, OT and/or SLP.  -Follow-up Neurology     HTN/A Fib/sCHF - Patient on Eliquis. Patient on Amlodipine 5 mg daily.  Consult hospitalist. History of low SBP at times. SBP into 150s, per hospitalist increase to 7.5 mg Amlodipine. SBP into 140s, continue Amlodipine 7.5 mg daily  -Chest pain on 2/7 with unchanged EKG. Hospitalist following      HLD - Patient allergic to statins.      Hyponatremia - Check AM CMP - 141, will monitor     DM2 with hyperglycemia - Patient on Lantus 10 daily and SSI. Consult hospitalist, continue Lantus 10 U and SSI     BPH - Patient on Finasteride 5 mg and Flomax 0.4 mg. PVRs into 100s, continue Finasteride 5 mg and Flomax 0.4 mg     Dementia - Per family with dementia for past 2 years. Will monitor     Vitamin D deficiency - 27 on admission, started on 1000 U    Pain - Patient on PRN APAP and Oxycodone     Skin - Patient at risk for skin breakdown due to debility in areas including sacrum, achilles, elbows and head in addition to other sites. Nursing to assess skin daily.      GI Ppx - Patient on Prilosec for GERD prophylaxis. Patient on Senna-docusate for constipation prophylaxis.       DVT Ppx - Patient Eliquis on transfer.  ____________________________________    T. Edouard Starks MD/PhD  [facility] - Physical Medicine & Rehabilitation   [facility] - Brain Injury Medicine   ____________________________________    Total time:  50 minutes. Time spent included pre-rounding review of vitals and tests, unit/floor time, face-to-face time with the patient including physical examination, care coordination, counseling of patient and/or family, ordering medications/procedures/tests, discussion with CM, PT, OT, SLP and/or other healthcare providers, and documentation in the electronic medical record. Topics discussed included discharge planning, confusion, elevated SBP, and chest pain work-up. Patient was discussed separately in IDT today; please see details above.

## 2024-02-08 NOTE — PROGRESS NOTES
"..                                                         NURSING DAILY NOTE    Name: Francesco Schulte   Date of Admission: 2/5/2024   Admitting Diagnosis: Left thalamic infarction (HCC)  Attending Physician: Jorgito Starks M.d.  Allergies: Atorvastatin, Doxycycline, Lisinopril, Simvastatin, Statins [hmg-coa-r inhibitors], Beta adrenergic blockers, Eplerenone, Metformin, and Spironolactone    Safety  Patient Assist  MOD  Patient Precautions  Fall Risk  Precaution Comments  Per daughter report, \"underdiagnosed vascular dementia.\" Poor historian.  Bed Transfer Status  Contact Guard Assist  Toilet Transfer Status   Contact Guard Assist  Assistive Devices  Rails, Wheelchair  Oxygen  None - Room Air  Diet/Therapeutic Dining  Current Diet Order   Procedures    Diet Order Diet: Cardiac; Second Modifier: (optional): Consistent CHO (Diabetic)     Pill Administration  whole  Agitated Behavioral Scale     ABS Level of Severity       Fall Risk  Has the patient had a fall this admission?   No  Ghazal Mcelroy Fall Risk Scoring  15, HIGH RISK  Fall Risk Safety Measures  bed alarm, chair alarm, seatbelt alarm, poor balance, and low vision/ hearing    Vitals  Temperature: 36.3 °C (97.4 °F)  Temp src: Oral  Pulse: 67  Respiration: 18  Blood Pressure : (!) 148/86  Blood Pressure MAP (Calculated): 107 MM HG  BP Location: Right, Upper Arm  Patient BP Position: Supine     Oxygen  Pulse Oximetry: 95 %  O2 (LPM): 0  O2 Delivery Device: None - Room Air    Bowel and Bladder  Last Bowel Movement  02/06/24  Stool Type  Type 6: Fluffy pieces with ragged edges, a mushy stool  Bowel Device  Bathroom  Continent  Bladder: Did not void (no void since admit)   Bowel: No movement (no BM since admit)  Bladder Function  Urine Void (mL): 100 ml  Urine Color: Unable To Evaluate  Genitourinary Assessment   Bladder Assessment (WDL):  WDL Except  Urine Color: Unable To Evaluate  Bladder Device: Urinal  Bladder Scan: Post Void  $ Bladder Scan " Results (mL): 223  Bladder Medications: Yes    Skin  Neal Score   18  Sensory Interventions   Bed Types: Standard/Trauma Mattress  Skin Preventative Measures: Heel Float Boots in Use , Pillows in Use for Support / Positioning  Moisture Interventions         Pain  Pain Rating Scale  0 - No Pain  Pain Location  Chest, Flank  Pain Location Orientation  Left  Pain Interventions   Declines    ADLs    Bathing      Linen Change      Personal Hygiene     Chlorhexidine Bath      Oral Care     Teeth/Dentures     Shave     Nutrition Percentage Eaten  Dinner, Less than 25% Consumed  Environmental Precautions     Patient Turns/Positioning  Patient Turns Self from Side to Side  Patient Turns Assistance/Tolerance     Bed Positions     Head of Bed Elevated         Psychosocial/Neurologic Assessment  Psychosocial Assessment  Psychosocial (WDL):  Within Defined Limits  Neurologic Assessment  Neuro (WDL): Exceptions to WDL  Level of Consciousness: Alert  Orientation Level: Oriented X4  Cognition: Follows commands  Speech: Clear  Pupil Assesment: No  Motor Function/Sensation Assessment: Motor strength, Sensation  RUE Sensation: Tingling, Numbness  Muscle Strength Right Arm: Good Strength Against Gravity and Moderate Resistance  LUE Sensation: Full sensation  Muscle Strength Left Arm: Good Strength Against Gravity and Moderate Resistance  RLE Sensation: Numbness, Tingling  Muscle Strength Right Leg: Good Strength Against Gravity and Moderate Resistance  LLE Sensation: Full sensation  Muscle Strength Left Leg: Good Strength Against Gravity and Moderate Resistance  EENT (WDL):  WDL Except    Cardio/Pulmonary Assessment  Edema      Respiratory Breath Sounds  RUL Breath Sounds: Clear  RML Breath Sounds: Clear  RLL Breath Sounds: Clear  EVELINA Breath Sounds: Clear  LLL Breath Sounds: Clear  Cardiac Assessment   Cardiac (WDL):  WDL Except (hx afib, cardiomyopathy, aicd, cad, stemi, htn, hld, chf)

## 2024-02-08 NOTE — WOUND TEAM
[facility]  Inpatient Services  Initial Wound and Skin Care Evaluation    Admission Date: 2/5/2024     Last order of IP CONSULT TO WOUND CARE was found on 2/7/2024 from Hospital Encounter on 2/5/2024     HPI, PMH, SH: Reviewed    Past Surgical History:   Procedure Laterality Date    ND DX BONE MARROW BIOPSIES Left 11/8/2023    Procedure: BIOPSY, BONE MARROW, USING NEEDLE OR TROCAR;  Surgeon: Ingrid Zambrano M.D.;  Location: SURGERY SAME DAY [facility];  Service: Orthopedics    BONE ASPIRATION BIOPSY Left 11/8/2023    Procedure: BONE MARROW BIOPSY AND ASPIRATION - DR. ANDERSON;  Surgeon: Ingrid Zambrano M.D.;  Location: SURGERY SAME DAY [facility];  Service: Orthopedics    PAMELA N/A 10/30/2021    Procedure: ECHOCARDIOGRAM, TRANSESOPHAGEAL;  Surgeon: Beau Gutierrez M.D.;  Location: [facility];  Service: Cardiac    AICD IMPLANT  07/29/2021    [employer] D233 implanted by Dr. Isaac.    SPLIT THICKNESS SKIN GRAFT N/A 8/23/2020    Procedure: APPLICATION, GRAFT, SKIN, SPLIT-THICKNESS;  Surgeon: Elisa Craig M.D.;  Location: [facility];  Service: Plastics    SKIN ABSCESS INCISION AND DRAINAGE Right 8/3/2020    Procedure: INCISION AND DRAINAGE - SCROTAL WOUND - WOUND VAC PLACEMENT;  Surgeon: Jaylen Hayes M.D.;  Location: [facility];  Service: Urology    ND EXPLORE SCROTUM Right 7/31/2020    Procedure: EXPLORATION, SCROTUM - FOR WASH OUT OF SCROTAL WOUND & WOUND VAC PLACEMENT;  Surgeon: Ferny Rosales M.D.;  Location: [facility];  Service: Urology    ND EXPLORE SCROTUM Right 7/29/2020    Procedure: EXPLORATION, SCROTUM - FOR I & D OF SCROTAL AND  GROIN WOUND;  Surgeon: Donta Viera M.D.;  Location: [facility];  Service: Urology    ND INCIS/DRAIN SCROTUM/TESTIS,EPIDIDYM Right 7/25/2020    Procedure: INCISION AND DRAINAGE, SCROTUM;  Surgeon: Nick Negro M.D.;  Location: [facility];  Service: Urology    TEMPORAL  ARTERY BIOPSY Right 6/26/2018    Procedure: TEMPORAL ARTERY BIOPSY;  Surgeon: Rajendra Aguilar M.D.;  Location: SURGERY SAME DAY [facility];  Service: General    CAROTID ENDARTERECTOMY Right 3/21/2018    Procedure: CAROTID ENDARTERECTOMY- W/EEG MONITORING;  Surgeon: Benny Morfin M.D.;  Location: SURGERY [facility];  Service: General    APPENDECTOMY      CATH PLACEMENT      right arm    LAMINOTOMY      diskectomy; C4    OTHER CARDIAC SURGERY      stents x 3     Social History     Tobacco Use    Smoking status: Never    Smokeless tobacco: Never   Substance Use Topics    Alcohol use: No     No chief complaint on file.    Diagnosis: Left sided cerebral hemisphere cerebrovascular accident (CVA) ([facility]) [I63.9]    Unit where seen by Wound Team: 05/01     WOUND CONSULT RELATED TO:  L lateral heel, R distal second toe    WOUND TEAM PLAN OF CARE - Frequency of Follow-up:   Nursing to follow dressing orders written for wound care. Contact wound team if area fails to progress, deteriorates or with any questions/concerns if something comes up before next scheduled follow up (See below as to whether wound is following and frequency of wound follow up)   Bi-Monthly -      WOUND HISTORY:   Pt is a 72 yo male who was admited to  following a R CVA.  Hx of DM, lupus and plolycythemia vera.  Pt states he is under the care of a podiatrist for foot wounds.  Pt politely refused care to the L heel because he would prefer to follow up with his podiatrist after discharge.  Pt was agreeable to ABIs and x-ray of the L toe.        WOUND ASSESSMENT/LDA  Wound 02/02/24 Pressure Injury Foot Right DTI (Active)   Date First Assessed/Time First Assessed: 02/02/24 1208   Present on Original Admission: Yes  Primary Wound Type: Pressure Injury  Location: Foot  Laterality: Right  Wound Description (Comments): DTI      Assessments 2/8/2024  1:00 PM   Wound Image       Site Assessment Dry   Periwound Assessment Pink   Drainage Amount Scant    Drainage Description Serous   Dressing Status Other (Comment)   Dressing Changed Changed   Dressing Cleansing/Solutions 3% Betadine   Dressing Change/Treatment Frequency Daily, and As Needed   NEXT Dressing Change/Treatment Date 02/09/24   NEXT Weekly Photo (Inpatient Only) 02/15/24   Wound Team Following Other (comment)   Wound Length (cm) 0.6 cm   Wound Width (cm) 0.8 cm   Wound Surface Area (cm^2) 0.48 cm^2   Wound Bed Eschar (%) 100 %   Wound Odor None   Pulses Not palpable       Wound 02/07/24 Diabetic Ulcer Heel Plantar Left (Active)   Date First Assessed/Time First Assessed: 02/07/24 1415   Present on Original Admission: Yes  Primary Wound Type: Diabetic Ulcer  Location: Heel  Wound Orientation: Plantar  Laterality: Left      Assessments 2/8/2024  1:00 PM   Wound Image      Site Assessment Dry   Periwound Assessment [place];Purple   Drainage Amount Scant   Drainage Description Serosanguineous   Dressing Status Other (Comment)   Dressing Changed Changed   Dressing Options Hydrofera Blue Ready;Silicone Adhesive Foam   Dressing Change/Treatment Frequency Every 72 hrs, and As Needed   NEXT Dressing Change/Treatment Date 02/11/24   NEXT Weekly Photo (Inpatient Only) 02/15/24   Wound Team Following Other (comment)   Wound Odor None   Pulses DP;1+;Left   WOUND NURSE ONLY - Time Spent with Patient (mins) 60        Vascular: 2/8/24   L DP very faint to palpation.  Unable to palpate L PT, R DP and PT    JW:   Ordered 2/8/24    Lab Values:    Lab Results   Component Value Date/Time    WBC 10.8 02/06/2024 06:39 AM    RBC 6.65 (H) 02/06/2024 06:39 AM    HEMOGLOBIN 17.3 02/06/2024 06:39 AM    HEMATOCRIT 55.7 (H) 02/06/2024 06:39 AM    CREACTPROT 0.33 08/20/2021 02:42 PM    SEDRATEWES 12 01/10/2022 01:26 PM    HBA1C 5.9 (H) 02/06/2024 06:39 AM         Culture Results show:  No results found for this or any previous visit (from the past 720 hour(s)).    Pain Level/Medicated:  None, Tolerated without pain medication        INTERVENTIONS BY WOUND TEAM:  Chart and images reviewed. Discussed with bedside RN. All areas of concern (based on picture review, LDA review and discussion with bedside RN) have been thoroughly assessed. Documentation of areas based on significant findings. This RN in to assess patient. Performed standard wound care which includes appropriate positioning, dressing removal and non-selective debridement. Pictures and measurements obtained weekly if/when required.    Wound:  L HEEL   Cleansed/Non-selectively Debrided with:  Normal Saline and Gauze  Sharmaine wound: Cleansed with Normal Saline and Wound cleanser, Prepped with No Sting  Primary Dressing:  orders for hydrofera blue, covered with silicone foam    R SECOND TOE  Cleaned with saline and gauze, painted with 3% betadine    Advanced Wound Care Discharge Planning  Number of Clinicians necessary to complete wound care:     Is patient requiring IV pain medications for dressing changes:  No   Length of time for dressing change 60 min. (This does not include chart review, pre-medication time, set up, clean up or time spent charting.)    Interdisciplinary consultation: Patient, Bedside RN (), .  Dr Jamey soto, Cheryl RN with wound updated    EVALUATION / RATIONALE FOR TREATMENT:     Date:  02/08/24  Wound Status:      L lateral heel has 2 small openings with sharmaine skin discoloration, discoloration may be related to polycythemia vera vs infection.  Presently there is not much swelling, no odor, no purulent drainage.  Pt is tender at area of wounds.  Respecting pt's wishes minimal treatment performed however will keep an eye on area should it deteriorate.  Pt wants to return to his podiatrist for care to this area.  The R second toe looks suspicious for osteomyelitis so ordered x-ray.  Diminished pulses by palpation so ordered ABIs.           Goals: Prevent secondary problems of L heel and R toe wounds    NURSING PLAN OF CARE ORDERS:  Dressing changes: See Dressing  Care orders    NUTRITION RECOMMENDATIONS         PREVENTATIVE INTERVENTIONS:    Q shift Neal - performed per nursing policy  Q shift pressure point assessments - performed per nursing policy    Surface/Positioning  Waffle overlay  - Currently in Place    Offloading/Redistribution  Heel float boots (Prevalon boot) - Currently in Place    Anticipated discharge plans:          Vac Discharge Needs:  Vac Discharge plan is purely a recommendation from wound team and not a requirement for discharge unless otherwise stated by physician.

## 2024-02-08 NOTE — THERAPY
"Physical Therapy   Daily Treatment     Patient Name: Francesco Schulte  Age:  73 y.o., Sex:  male  Medical Record #: [id number]  Today's Date: 2/8/2024     Precautions  Precautions: Fall Risk  Comments: Per daughter report, \"underdiagnosed vascular dementia.\" Poor historian.    Subjective    Pt is in his bed and agreeable to participate in therapy.     Objective       02/08/24 1101   PT Charge Group   PT Therapeutic Exercise (Units) 1   PT Neuromuscular Re-Education / Balance (Units) 1   PT Total Time Spent   PT Individual Total Time Spent (Mins) 30   Transfer Functional Level of Assist   Bed, Chair, Wheelchair Transfer Standby Assist   Bed Chair Wheelchair Transfer Description Verbal cueing;Supervision for safety;Increased time;Adaptive equipment   Standing Lower Body Exercises   Mini Squat Partial;2 sets of 10   Other Exercises goblet squats using 4lb ball x 10 reps x 2 sets   Bed Mobility    Supine to Sit Supervised   Sit to Supine Supervised   Sit to Stand Supervised   Scooting Supervised   Rolling Independent   Neuro-Muscular Treatments   Neuro-Muscular Treatments Postural Changes;Postural Facilitation;Tactile Cuing;Verbal Cuing   Comments Facilitated and instructed pt in dynamic standing consisting of:: overhead reach while carrying a 4lb ball, diagonal pattern movement carrying 4lb ball in unsupported standing and step up/down on 4 inch step from single handsupport to unsupported standing.   Interdisciplinary Plan of Care Collaboration   Patient Position at End of Therapy Seated;Chair Alarm On  (left in the dining area.)         Assessment    Pt completed the standing exercises without lob and sob. PT provided cueing and demo in proper exercise techniques and form. He performed step up/down on 4\" step in // bars from single hand support to unsupported standing. PT provided cueing for sequencing and foot placement. Pt expressed that he would like to continue this exercise at home. PT educated him that he " needs supervision and his family needs to be trained for all his HEP and guarding him. Pt verbalized understanding and agreed.  Strengths: Willingly participates in therapeutic activities, Motivated for self care and independence  Barriers: Dementia, Impaired balance, Impaired activity tolerance, Fatigue (right foot drag with fatigue)    Plan    Standing balance, general strengthening/conditioning, gait training with SPC, stair/curb negotiation.     DME  PT DME Recommendations  Additional Equipment:  (LHS, reacher)    Passport items to be completed:  Get in/out of bed safely, in/out of a vehicle, safely use mobility device, walk or wheel around home/community, navigate up and down stairs, show how to get up/down from the ground, ensure home is accessible, demonstrate HEP, complete caregiver training    Physical Therapy Problems (Active)       Problem: Mobility       Dates: Start:  02/06/24         Goal: STG-Within one week, patient will ambulate community distances 200ft x 2 with left SPC SPV       Dates: Start:  02/06/24            Goal: STG-Within one week, patient will ascend and descend four to six stairs no rails with step over patterning SBA       Dates: Start:  02/06/24               Problem: Mobility Transfers       Dates: Start:  02/06/24         Goal: STG-Within one week, patient will transfer bed to chair SPV SPT       Dates: Start:  02/06/24               Problem: PT-Long Term Goals       Dates: Start:  02/06/24         Goal: LTG-By discharge, patient will ambulate 400ft left SPC mod I level surfaces, SPV outdoors       Dates: Start:  02/06/24            Goal: LTG-By discharge, patient will transfer one surface to another mod I SPT safely and consistently       Dates: Start:  02/06/24            Goal: LTG-By discharge, patient will perform home exercise program seated/standing for LE strengthening to be done 3x/day in safe, independent home environment       Dates: Start:  02/06/24            Goal:  LTG-By discharge, patient will up/down curb step to simulate home entrance with SPC with SPV       Dates: Start:  02/06/24

## 2024-02-08 NOTE — THERAPY
"Occupational Therapy  Daily Treatment     Patient Name: Francesco Schulte  Age:  73 y.o., Sex:  male  Medical Record #: [id number]  Today's Date: 2/8/2024     Precautions  Precautions: Fall Risk  Comments: Per daughter report, \"underdiagnosed vascular dementia.\" Poor historian.    Subjective    Pt pleasant and willing to participate in OT     Objective     02/08/24 1401   OT Charge Group   Charges Yes   OT Self Care / ADL (Units) 1   OT Neuromuscular Re-education / Balance (Units) 2   OT Therapy Activity (Units) 1   OT Total Time Spent   OT Individual Total Time Spent (Mins) 60   Precautions   Precautions Fall Risk   Comments Per daughter report, \"underdiagnosed vascular dementia.\" Poor historian.   Vitals   Pulse 78   Patient BP Position Sitting   Blood Pressure  133/83   Pulse Oximetry 91 %   O2 Delivery Device None - Room Air   Pain   Intervention Medication (see MAR)   Pain 0 - 10 Group   Location Head   Description Aching   Cognition    Level of Consciousness Alert   ABS (Agitated Behavior Scale)   Agitated Behavior Scale Performed Yes   Short Attention Span, Easy Distractibility, Inability to Concentrate 1   Impulsive, Impatient, Low Tolerance for Pain or Frustration 2   Uncooperative, Resistant to Care, Demanding 1   Violent and/or Threatening Violence Toward People or Property 1   Explosive and/or Unpredictable Anger 1   Rocking, Rubbing, Moaning, Other Self-Stimulating Behavior 1   Pulling at Tubes, Restraints, etc. 1   Wandering from Treatment Area 1   Restlessness, Pacing, Excessive Movement 1   Repetitive Behaviors, Motor and/or Verbal 1   Rapid, Loud or Excessive Talking 1   Sudden Changes of Mood 1   Easily Initiated - Excessive Crying and/or Laughter 1   Self-Abusiveness, Physical and/or Verbal 1   Agitated Behavior Scale Total Score 15   Level of Severity No Agitation   Sleep/Wake Cycle   Sleep & Rest Resting   Functional Level of Assist   Grooming Standby Assist;Standing   Grooming Description " Increased time;Standing at sink;Supervision for safety   Toileting Standby Assist   Toileting Description Grab bar;Increased time;Supervision for safety;Verbal cueing   Bed, Chair, Wheelchair Transfer Standby Assist   Bed Chair Wheelchair Transfer Description Initial preparation for task;Supervision for safety;Verbal cueing;Set-up of equipment   Toilet Transfers Standby Assist   Toilet Transfer Description Grab bar;Increased time;Supervision for safety   Hand Strengthening   Comment Pt initially standing w/ SBA for clothes pin tree activity; however, pt began to feel a headache come on and sat for the remainder of activity. VItals taken as noted above. Pt utilized R hand to put on/take off blue/black clothes pins (hardest resistance).   Balance   Comments Pt tolerated step activity w/ air-ex pad and CGA. Upon standing on pad, pt tolerated x5 partial squats, holding onto bar w/ one hand. Pt as well tolerated cornhole activity standing on air-ex pad w/ SBA. Pt ambulated from pt rm <> main gym w/ CGA and SPC.   Bed Mobility    Supine to Sit Supervised   Sit to Supine Supervised   Scooting Supervised   Rolling Supervised   Interdisciplinary Plan of Care Collaboration   IDT Collaboration with  Nursing   Patient Position at End of Therapy In Bed;Bed Alarm On;Call Light within Reach;Tray Table within Reach;Phone within Reach   Collaboration Comments RN med pass   Strengths & Barriers   Strengths Adequate strength;Alert and oriented;Good insight into deficits/needs;Independent prior level of function;Motivated for self care and independence;Pleasant and cooperative;Supportive family;Willingly participates in therapeutic activities   Barriers Impaired activity tolerance;Impaired balance     Assessment    Pt seen for tx, addressing toileting, functional mobility, and balance. Pt tolerated activity fair d/t pain. Pt progressing towards goals. Continue OT POC.    Strengths: Adequate strength, Alert and oriented, Good insight  into deficits/needs, Independent prior level of function, Motivated for self care and independence, Pleasant and cooperative, Supportive family, Willingly participates in therapeutic activities    Barriers: Impaired activity tolerance, Impaired balance    Plan    Cont ADLs, functional transfers, neuro re-ed/balance, and thera act/ex to maximize functional recovery for safe DC home.      DME  OT DME Recommendations  Bathroom Equipment:  (shower chair)  Additional Equipment:  (LHS, reacher)    Passport items to be completed:  Perform bathroom transfers, complete dressing, complete feeding, get ready for the day, prepare a simple meal, participate in household tasks, adapt home for safety needs, demonstrate home exercise program, complete caregiver training     Occupational Therapy Goals (Active)       Problem: Dressing       Dates: Start:  02/06/24         Goal: STG-Within one week, patient will dress UB and LB at [facility] using LRD       Dates: Start:  02/06/24               Problem: Functional Transfers       Dates: Start:  02/06/24         Goal: STG-Within one week, patient will transfer to toilet at [facility] to [facility] using LRD       Dates: Start:  02/06/24            Goal: STG-Within one week, patient will transfer to tub/shower at [facility] to [facility] using LRD       Dates: Start:  02/06/24               Problem: IADL's       Dates: Start:  02/06/24         Goal: STG-Within one week, patient will access kitchen area at [facility] to [facility] using LRD       Dates: Start:  02/06/24               Problem: OT Long Term Goals       Dates: Start:  02/06/24         Goal: LTG-By discharge, patient will complete basic self care tasks at [facility] to independent        Dates: Start:  02/06/24            Goal: LTG-By discharge, patient will perform bathroom transfers at [facility] to independent        Dates: Start:  02/06/24            Goal: LTG-By discharge, patient will complete basic home management at [facility] to independent        Dates: Start:  02/06/24                Problem: Toileting       Dates: Start:  02/06/24         Goal: STG-Within one week, patient will complete toileting tasks at [facility] to [facility] using LRD       Dates: Start:  02/06/24

## 2024-02-08 NOTE — CARE PLAN
"  Problem: Skin Integrity  Goal: Patient's skin integrity will be maintained or improve  Note: Wound care team RN assessed patient left foot diabetic ulcers. New orders in place.      Problem: Fall Risk - Rehab  Goal: Patient will remain free from falls  Note: Ghazal Mcelroy Fall risk Assessment Score: 15    High fall risk Interventions   - Bed and strip alarm   - Yellow sign by the door   - Yellow wrist band \"Fall risk\"  - Room near to the nurse station  - Do not leave patient unattended in the bathroom  - Fall risk education provided     The patient is Stable - Low risk of patient condition declining or worsening       "

## 2024-02-09 NOTE — DIETARY
Nutrition Services:    Per WT notes, pt has a DTI on his right foot and DM ulcer on his left plantar. WT nurse is concerned that pt may have osteomyelitis to his right 2nd toe. Xray ordered and completed w/ report of  Soft tissue wound of the second toe with subjacent cortical erosion of the distal phalanx, consistent with osteomyelitis.     Recorded PO intake has been variable but overall PO has been % of most meals. PO intake most likely adequate    RD will follow per dept guidelines.

## 2024-02-09 NOTE — THERAPY
"Speech Language Pathology  Daily Treatment     Patient Name: Francesco Schulte  Age:  73 y.o., Sex:  male  Medical Record #: [id number]  Today's Date: 2/9/2024     Precautions  Precautions: Fall Risk  Comments: Per daughter report, \"underdiagnosed vascular dementia.\" Poor historian.    Subjective    Pt handoff with PT. Pt completed session in SLP office.      Objective       02/09/24 1003   Treatment Charges   SLP Cognitive Skill Development First 15 Minutes 1   SLP Cognitive Skill Development Additional 15 Minutes 1   SLP Total Time Spent   SLP Individual Total Time Spent (Mins) 30         Assessment    Pt completed medication sort at this time using AM/PM pill box. Pt accurately sorted 7/8 medications indep., with the exception of one (stool softener), pt was not previously taking this medication before hospitalization and will likely not be continuing it at home. Pt educated on importance of accurately sorting medications and discussed with pt revisiting medication sort during next session to ensure 100% accuracy.     Strengths: Able to follow instructions, Alert and oriented, Effective communication skills, Independent prior level of function, Willingly participates in therapeutic activities, Supportive family  Barriers: Impaired functional cognition    Plan    Revisit medication sort.    Speech Therapy Problems (Active)       Problem: Memory STGs       Dates: Start:  02/06/24         Goal: STG-Within one week, patient will implement functional memory strategies with 80% accuracy provided MIN cues.       Dates: Start:  02/06/24               Problem: Problem Solving STGs       Dates: Start:  02/06/24         Goal: STG-Within one week, patient will complete functional medication and financial management tasks with 80% accuracy with MIN cues.       Dates: Start:  02/06/24               Problem: Speech/Swallowing LTGs       Dates: Start:  02/06/24         Goal: LTG-By discharge, patient will complete functional " problem solving and recall information with 80% accuracy provided MOD I.       Dates: Start:  02/06/24

## 2024-02-09 NOTE — HOSPICE
Received call from TOSIN about a Palliative referral coming.  Aretha called to setup an appt, she agreed to 1300 Saturday in the dinning room.

## 2024-02-09 NOTE — PROGRESS NOTES
[facility] Medicine Daily Progress Note    Date of Service  2/9/2024    Chief Complaint:  Hypertension  Diabetes    Interval History:  No significant events overnight.    Review of Systems  Review of Systems   Constitutional:  Negative for chills and fever.   Respiratory:  Negative for shortness of breath.    Cardiovascular:  Negative for chest pain.   Gastrointestinal:  Negative for abdominal pain, diarrhea, nausea and vomiting.   Psychiatric/Behavioral:  The patient is not nervous/anxious.         Physical Exam  Temp:  [36.8 °C (98.2 °F)-37.2 °C (98.9 °F)] 37.2 °C (98.9 °F)  Pulse:  [58-78] 58  Resp:  [18] 18  BP: (122-143)/(70-83) 143/75  SpO2:  [91 %-96 %] 95 %    Physical Exam  Vitals and nursing note reviewed.   Constitutional:       Appearance: Normal appearance.   Eyes:      Pupils: Pupils are equal, round, and reactive to light.   Cardiovascular:      Rate and Rhythm: Normal rate and regular rhythm.   Pulmonary:      Effort: Pulmonary effort is normal.      Breath sounds: Normal breath sounds.   Abdominal:      General: Bowel sounds are normal.      Palpations: Abdomen is soft.   Musculoskeletal:      Cervical back: Normal range of motion and neck supple.      Right lower leg: No edema.      Left lower leg: No edema.   Skin:     General: Skin is warm and dry.   Neurological:      Mental Status: He is alert and oriented to person, place, and time.      Comments: Has dementia and fabricates.   Psychiatric:         Mood and Affect: Mood normal.         Behavior: Behavior normal.         Fluids    Intake/Output Summary (Last 24 hours) at 2/9/2024 0912  Last data filed at 2/9/2024 0515  Gross per 24 hour   Intake 40 ml   Output 1000 ml   Net -960 ml         Laboratory                            Imaging    Assessment/Plan  * Left thalamic infarction (HCC)- (present on admission)  Assessment & Plan  Cont ASA  Cont Eliquis    Dementia (HCC)  Assessment & Plan  Has had dementia for about 2 years  Pt fabricates  things    CHF (congestive heart failure) ([facility])  Assessment & Plan  Has hx  Has hx of AICD  BNP: 3537 (4/2023) --> 3703 (2/2024)  Cont Eliquis  Echo (1/25): EF 25%, RVSP 53    Paroxysmal A-fib ([facility])  Assessment & Plan  HR ok  Last few EKG's showed normal sinus rhythm  Not on any rate controlling meds  Cont Eliquis  Cont to monitor    Vitamin D insufficiency  Assessment & Plan  Vit D: 27  Cont supplements    Chest pain- (present on admission)  Assessment & Plan  Had CP on 2/7  Trop: 79 (within baseline recently)  BNP: 3537 (4/2023) --> 3703 (2/8/2024)  EKG: showed SR; no ST elevations or depression  Has hx of on & off CP    Had recent CP at [facility]:  Trops were elevated up to 83 and returned to 82  He reported ongoing chest pain  Case was discussed with on-call Cardiologist Dr. Nadeem Silver confirmed patient has an LBBB but no STEMI, there was no warrant for any angiography  Patient's troponinemia is from demand ischemia     Essential hypertension- (present on admission)  Assessment & Plan  BP ok but occ rises up a little  Cont Norvasc: 5 mg daily --> 7.5 mg daily (2/8)  Cont to monitor    Type 2 diabetes mellitus with hyperglycemia ([facility])- (present on admission)  Assessment & Plan  Hba1c: 5.9 (2/6)  BS a little labile but ok  Cont Glargine: 10 units qam  Note: home meds includes Tresiba 10 units qd (and takes less depending on the BS)  Cont to monitor

## 2024-02-09 NOTE — PROGRESS NOTES
"                                                         NURSING DAILY NOTE    Name: Francesco Schulte   Date of Admission: 2/5/2024   Admitting Diagnosis: Left thalamic infarction (HCC)  Attending Physician: Jorgito Starks M.d.  Allergies: Atorvastatin, Doxycycline, Lisinopril, Simvastatin, Statins [hmg-coa-r inhibitors], Beta adrenergic blockers, Eplerenone, Metformin, and Spironolactone    Safety  Patient Assist  MOD  Patient Precautions  Fall Risk  Precaution Comments  Per daughter report, \"underdiagnosed vascular dementia.\" Poor historian.  Bed Transfer Status  Standby Assist  Toilet Transfer Status   Standby Assist  Assistive Devices  Rails, Wheelchair  Oxygen  None - Room Air  Diet/Therapeutic Dining  Current Diet Order   Procedures    Diet Order Diet: Cardiac; Second Modifier: (optional): Consistent CHO (Diabetic)     Pill Administration  whole  Agitated Behavioral Scale  15  ABS Level of Severity  No Agitation    Fall Risk  Has the patient had a fall this admission?   No  Ghazal Mcelroy Fall Risk Scoring  15, HIGH RISK  Fall Risk Safety Measures  bed alarm and chair alarm    Vitals  Temperature: 36.8 °C (98.2 °F)  Temp src: Oral  Pulse: 66  Respiration: 18  Blood Pressure : 122/70  Blood Pressure MAP (Calculated): 87 MM HG  BP Location: Right, Upper Arm  Patient BP Position: Supine     Oxygen  Pulse Oximetry: 95 %  O2 (LPM): 0  O2 Delivery Device: None - Room Air    Bowel and Bladder  Last Bowel Movement  02/06/24  Stool Type  Type 6: Fluffy pieces with ragged edges, a mushy stool  Bowel Device  Bathroom  Continent  Bladder: Did not void (no void since admit)   Bowel: No movement (no BM since admit)  Bladder Function  Urine Void (mL): 200 ml  Urine Color: Unable To Evaluate  Genitourinary Assessment   Bladder Assessment (WDL):  WDL Except  Urine Color: Unable To Evaluate  Bladder Device: Bathroom  Bladder Scan: Post Void  $ Bladder Scan Results (mL): 223  Bladder Medications: " Yes    Skin  Neal Score   18  Sensory Interventions   Bed Types: Standard/Trauma Mattress  Skin Preventative Measures: Heel Float Boots in Use , Pillows in Use for Support / Positioning  Moisture Interventions         Pain  Pain Rating Scale  4 - Distracts me, can do usual activities  Pain Location  Flank  Pain Location Orientation  Left  Pain Interventions   Medication (see MAR)    ADLs    Bathing      Linen Change      Personal Hygiene     Chlorhexidine Bath      Oral Care     Teeth/Dentures     Shave     Nutrition Percentage Eaten  Lunch, Between % Consumed  Environmental Precautions     Patient Turns/Positioning  Patient Turns Self from Side to Side  Patient Turns Assistance/Tolerance     Bed Positions     Head of Bed Elevated         Psychosocial/Neurologic Assessment  Psychosocial Assessment  Psychosocial (WDL):  Within Defined Limits  Neurologic Assessment  Neuro (WDL): Exceptions to WDL  Level of Consciousness: Alert  Orientation Level: Oriented X4  Cognition: Follows commands  Speech: Clear  Pupil Assesment: No  Motor Function/Sensation Assessment: Motor strength, Sensation  RUE Sensation: Tingling, Numbness  Muscle Strength Right Arm: Good Strength Against Gravity and Moderate Resistance  LUE Sensation: Full sensation  Muscle Strength Left Arm: Good Strength Against Gravity and Moderate Resistance  RLE Sensation: Numbness, Tingling  Muscle Strength Right Leg: Good Strength Against Gravity and Moderate Resistance  LLE Sensation: Full sensation  Muscle Strength Left Leg: Good Strength Against Gravity and Moderate Resistance  EENT (WDL):  WDL Except    Cardio/Pulmonary Assessment  Edema      Respiratory Breath Sounds  RUL Breath Sounds: Clear  RML Breath Sounds: Clear  RLL Breath Sounds: Clear  EVELINA Breath Sounds: Clear  LLL Breath Sounds: Clear  Cardiac Assessment   Cardiac (WDL):  WDL Except (hx afib, cardiomyopathy, aicd, cad, stemi, htn, hld, chf)

## 2024-02-09 NOTE — PROGRESS NOTES
"  Physical Medicine & Rehabilitation Progress Note    Encounter Date: 2/9/2024    Chief Complaint: Decreased mobility, weakness    Interval Events (Subjective):  Patient sitting up in room. He reports he is doing OK. Wound care was consulted and XR is concerning for possible osteomyelitis. Family is in discussions about whether he should speak with palliative. Discussed would refer to palliative with CM. WBC wnl this AM so will hold on treatment until further discussion of goals of care.     _____________________________________  Interdisciplinary Team Conference   Most recent IDT on 2/8/2024    Discharge Date/Disposition:  2/19/24  _____________________________________      Objective:  VITAL SIGNS: BP (!) 143/75   Pulse (!) 58   Temp 37.2 °C (98.9 °F) (Oral)   Resp 18   Ht 1.854 m (6' 1\")   Wt 80.7 kg (178 lb)   SpO2 95%   BMI 23.48 kg/m²   Gen: NAD  Psych: Mood and affect appropriate  CV: RRR, 0 edema  Resp: CTAB, no upper airway sounds  Abd: NTND  Neuro: AOx3, following commands, 4/5 RLE  Unchanged from 2/8/24    Laboratory Values:  Recent Results (from the past 72 hour(s))   POCT glucose device results    Collection Time: 02/06/24  5:25 PM   Result Value Ref Range    POC Glucose, Blood 127 (H) 65 - 99 mg/dL   POCT glucose device results    Collection Time: 02/07/24  7:28 AM   Result Value Ref Range    POC Glucose, Blood 116 (H) 65 - 99 mg/dL   POCT glucose device results    Collection Time: 02/07/24 11:24 AM   Result Value Ref Range    POC Glucose, Blood 213 (H) 65 - 99 mg/dL   TROPONIN    Collection Time: 02/07/24  1:12 PM   Result Value Ref Range    Troponin T 76 (H) 6 - 19 ng/L   proBrain Natriuretic Peptide, NT    Collection Time: 02/07/24  1:12 PM   Result Value Ref Range    NT-proBNP 3703 (H) 0 - 125 pg/mL   EKG    Collection Time: 02/07/24  1:19 PM   Result Value Ref Range    Report       [facility]    Test Date:  2024-02-07  Pt Name:    MARZENA ROMEO             Department: [facility]  MRN:     "    [id number]                      Room:       [facility]  Gender:     Male                         Technician: [id number]  :        1950                   Requested By:TOMASZ HERNANDEZELIANE  Order #:    [id number]                    Reading MD: Darien Adams MD    Measurements  Intervals                                Axis  Rate:       77                           P:          26  AL:         232                          QRS:        -50  QRSD:       104                          T:          149  QT:         419  QTc:        475    Interpretive Statements  Sinus rhythm  Prolonged AL interval  Left atrial enlargement  LVH with secondary repolarization abnormality  Anterior infarct, old    Electronically Signed On 2024 14:32:12 PST by Darien Adams MD     POCT glucose device results    Collection Time: 24  5:26 PM   Result Value Ref Range    POC Glucose, Blood 104 (H) 65 - 99 mg/dL   POCT glucose device results    Collection Time: 24  8:36 PM   Result Value Ref Range    POC Glucose, Blood 138 (H) 65 - 99 mg/dL   POCT glucose device results    Collection Time: 24  7:24 AM   Result Value Ref Range    POC Glucose, Blood 111 (H) 65 - 99 mg/dL   POCT glucose device results    Collection Time: 24 11:29 AM   Result Value Ref Range    POC Glucose, Blood 168 (H) 65 - 99 mg/dL   POCT glucose device results    Collection Time: 24  5:31 PM   Result Value Ref Range    POC Glucose, Blood 129 (H) 65 - 99 mg/dL   POCT glucose device results    Collection Time: 24  8:21 AM   Result Value Ref Range    POC Glucose, Blood 119 (H) 65 - 99 mg/dL   POCT glucose device results    Collection Time: 24 11:30 AM   Result Value Ref Range    POC Glucose, Blood 211 (H) 65 - 99 mg/dL       Medications:  Scheduled Medications   Medication Dose Frequency    miconazole   BID    amLODIPine  7.5 mg Q DAY    insulin lispro  2-12 Units 4X/DAY ACHS    vitamin D3  1,000 Units DAILY    senna-docusate  2 Tablet BID    omeprazole   20 mg DAILY    apixaban  5 mg BID    aspirin  81 mg Q EVENING    finasteride  5 mg QDAY    insulin GLARGINE  10 Units QAM INSULIN    tamsulosin  0.4 mg DAILY     PRN medications: Respiratory Therapy Consult, hydrALAZINE, acetaminophen, senna-docusate **AND** polyethylene glycol/lytes, mag hydrox-al hydrox-simeth, ondansetron **OR** ondansetron, traZODone, sodium chloride, oxyCODONE immediate-release **OR** oxyCODONE immediate-release, [DISCONTINUED] insulin regular **AND** POC blood glucose manual result **AND** NOTIFY MD and PharmD **AND** Administer 20 grams of glucose (approximately 8 ounces of fruit juice) every 15 minutes PRN FSBG less than 70 mg/dL **AND** dextrose bolus, dicyclomine    Diet:  Current Diet Order   Procedures    Diet Order Diet: Cardiac; Second Modifier: (optional): Consistent CHO (Diabetic)       Medical Decision Making and Plan:  L CVA - 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 He has been started on ASA and Eliquis  -PT and OT for mobility and ADLs. Per guidelines, 15 hours per week between PT, OT and/or SLP.  -Follow-up Neurology     HTN/A Fib/sCHF - Patient on Eliquis. Patient on Amlodipine 5 mg daily.  Consult hospitalist. History of low SBP at times. SBP into 150s, per hospitalist increase to 7.5 mg Amlodipine. SBP into 140s, continue amlodipine 7.5 mg daily  -Chest pain on 2/7 with unchanged EKG. Hospitalist following      HLD - Patient allergic to statins.      Hyponatremia - Check AM CMP - 141, will monitor     DM2 with hyperglycemia - Patient on Lantus 10 daily and SSI. Consult hospitalist, continue Lantus 10 U and SSI     BPH - Patient on Finasteride 5 mg and Flomax 0.4 mg. PVRs into 100s, continue Finasteride 5 mg and Flomax 0.4 mg     Dementia - Per family with dementia for past 2 years. Will monitor     Vitamin D deficiency - 27 on admission, started on 1000 U    Pain - Patient on PRN APAP and Oxycodone     Skin - Patient at risk for skin breakdown due to debility in  areas including sacrum, achilles, elbows and head in addition to other sites. Nursing to assess skin daily.   -Right foot/left ankle diabetic pressure ulcer.  Wound care consulted. XR concerning for osteomyelitis. Hold on treatment as family to discuss with palliative.      GI Ppx - Patient on Prilosec for GERD prophylaxis. Patient on Senna-docusate for constipation prophylaxis.      DVT Ppx - Patient Eliquis on transfer.    Dispo - Family with concerns about discharge. Palliative consulted  ____________________________________    T. Edouard Straks MD/PhD  [facility] - Physical Medicine & Rehabilitation   [facility] - Brain Injury Medicine   ____________________________________

## 2024-02-09 NOTE — THERAPY
"Physical Therapy   Daily Treatment     Patient Name: Francesco Schulte  Age:  73 y.o., Sex:  male  Medical Record #: [id number]  Today's Date: 2/9/2024     Precautions  Precautions: (P) Fall Risk  Comments: (P) Per daughter report, \"underdiagnosed vascular dementia.\" Poor historian.    Subjective    Patient in bed, declining therapy however agreeable to \"a negotiation\". (Agreeable to staying in room to pack belongings.)     Objective       02/09/24 1231   PT Charge Group   PT Therapeutic Activities (Units) 2   PT Total Time Spent   PT Individual Total Time Spent (Mins) 30   Precautions   Precautions Fall Risk   Comments Per daughter report, \"underdiagnosed vascular dementia.\" Poor historian.   Gait Functional Level of Assist    Gait Level Of Assist Standby Assist   Assistive Device None;Single Point Cane   Distance (Feet)   (short distances in room)   Deviation   (occasional LOBs, impaired RLE motor control causing decreased foot clearance)   Transfer Functional Level of Assist   Bed, Chair, Wheelchair Transfer Standby Assist   Bed Chair Wheelchair Transfer Description Increased time;Supervision for safety  (stand step transfer with no AD vs. SPC)   Bed Mobility    Supine to Sit Supervised   Sit to Supine Supervised   Sit to Stand Supervised   Scooting Supervised   Rolling Supervised   Interdisciplinary Plan of Care Collaboration   IDT Collaboration with  Occupational Therapist;Physician   Patient Position at End of Therapy In Bed;Bed Alarm On;Tray Table within Reach;Call Light within Reach;Phone within Reach   Collaboration Comments CLOF, patient stating he is leaving today     Functional mobility in room, including LB dressing in sitting and standing, as well as reaching on floor and in closets for items. Patient insistent that he is leaving the hospital today.    Assessment    Patient tolerated session poorly, declined structured therapy however agreeable to packing items in room. Patient under the impression " "that he has Dr. Starks \"arranging things\" for him to be able to discharge today. Continues to be high fall risk due to balance and motor control impairments, as well as cognitive impairments.    Strengths: Willingly participates in therapeutic activities, Motivated for self care and independence  Barriers: Dementia, Impaired balance, Impaired activity tolerance, Fatigue (right foot drag with fatigue)    Plan    Standing balance, general strengthening/conditioning, gait training with SPC, stair/curb negotiation.      DME  PT DME Recommendations  Owns SPC  Additional Equipment:  (LHS, reacher)     Passport items to be completed:  Get in/out of bed safely, in/out of a vehicle, safely use mobility device, walk or wheel around home/community, navigate up and down stairs, show how to get up/down from the ground, ensure home is accessible, demonstrate HEP, complete caregiver training    Physical Therapy Problems (Active)       Problem: Mobility       Dates: Start:  02/06/24         Goal: STG-Within one week, patient will ambulate community distances 200ft x 2 with left SPC SPV       Dates: Start:  02/06/24            Goal: STG-Within one week, patient will ascend and descend four to six stairs no rails with step over patterning SBA       Dates: Start:  02/06/24               Problem: Mobility Transfers       Dates: Start:  02/06/24         Goal: STG-Within one week, patient will transfer bed to chair SPV SPT       Dates: Start:  02/06/24               Problem: PT-Long Term Goals       Dates: Start:  02/06/24         Goal: LTG-By discharge, patient will ambulate 400ft left SPC mod I level surfaces, SPV outdoors       Dates: Start:  02/06/24            Goal: LTG-By discharge, patient will transfer one surface to another mod I SPT safely and consistently       Dates: Start:  02/06/24            Goal: LTG-By discharge, patient will perform home exercise program seated/standing for LE strengthening to be done 3x/day in safe, " independent home environment       Dates: Start:  02/06/24            Goal: LTG-By discharge, patient will up/down curb step to simulate home entrance with SPC with SPV       Dates: Start:  02/06/24

## 2024-02-09 NOTE — CONSULTS
Palliative Care   Received notification from [facility] liaison regarding possible IP palliative consult as he received a call from case management. Per [facility], hospice will meet with patient/family. Will defer palliative care consult at this time and follow outcome of hospice discussion.     CONNER Nunez.  Palliative Care Nurse Practitioner  [phone]

## 2024-02-09 NOTE — THERAPY
"Missed Therapy    Patient Name: Francesco Schulte  Age:  73 y.o., Sex:  male  Medical Record #: [id number]  Today's Date: 2/9/2024    Discussed missed therapy with MD and .     Pt encountered for OT supine in bed with bag packed on bed. Pt reported to this therapist that \"therapy is cancelled and he is waiting to here back from the doctor regarding when he can go home.\" OTR attempted to redirect pt to see if he would participate in a shower. Pt refused a shower.     02/09/24 1401   Therapy Missed   Missed Therapy (Minutes) 60   Reason For Missed Therapy Medical - Patient on Hold from Therapy  (confused and refused therapy. Pt reported to this therapist that \"therapy is cancelled and he is waiting to here back from the doctor regarding when he can go home.\")       "

## 2024-02-09 NOTE — PROGRESS NOTES
" NURSING DAILY NOTE    Name: Francesco Schulte  Date of Admission: 2/5/2024  Admitting Diagnosis: Left thalamic infarction (HCC)  Attending Physician: Jorgito Starks M.d.  Allergies: Atorvastatin, Doxycycline, Lisinopril, Simvastatin, Statins [hmg-coa-r inhibitors], Beta adrenergic blockers, Eplerenone, Metformin, and Spironolactone    Safety  Patient Assist  oMOD  Patient Precautions  oFall Risk  Precaution Comments  oPer daughter report, \"underdiagnosed vascular dementia.\" Poor historian.  Bed Transfer Status  oStandby Assist  Toilet Transfer Status  oStandby Assist  Assistive Devices  oRails, Wheelchair  Oxygen  oNone - Room Air  Diet/Therapeutic Dining  o  Current Diet Order   Procedures    Diet Order Diet: Cardiac; Second Modifier: (optional): Consistent CHO (Diabetic)     Pill Administration  owhole  Agitated Behavioral Scale  o15  ABS Level of Severity  Shelly Agitation    Fall Risk  Has the patient had a fall this admission?  Shelly  Ghazal Mcelroy Fall Risk Scoring  o15, HIGH RISK  Fall Risk Safety Measures  saumya alarm and chair alarm    Vitals  Temperature: 37.2 °C (98.9 °F)  Temp src: Oral  Pulse: (!) 58  Respiration: 18  Blood Pressure : (!) 143/75  Blood Pressure MAP (Calculated): 98 MM HG  BP Location: Right, Upper Arm  Patient BP Position: Supine    Oxygen  Pulse Oximetry: 95 %  O2 (LPM): 0  O2 Delivery Device: None - Room Air    Bowel and Bladder  Last Bowel Movement  o02/06/24  Stool Type  oType 6: Fluffy pieces with ragged edges, a mushy stool  Bowel Device  oBathroom  Continent  oBladder: Did not void (no void since admit)  oBowel: No movement (no BM since admit)  Bladder Function  oUrine Void (mL): 200 ml  Urine Color: Unable To Evaluate  Genitourinary Assessment  oBladder Assessment (WDL):  WDL Except  Andres Catheter: Not Applicable  Urine Color: Unable To Evaluate  Bladder Device: Bathroom, Urinal  Bladder Scan: Post Void  $ Bladder Scan Results (mL): 89  Bladder Medications: " Yes    Skin  Neal Score  o 18  Sensory Interventions  o Bed Types: Standard/Trauma Mattress with Overlay  Skin Preventative Measures: Pillows in Use for Support / Positioning  Moisture Interventions  o       Pain  Pain Rating Scale  o4 - Distracts me, can do usual activities  Pain Location  oBack, Chest  Pain Location Orientation  oLower, Left  Pain Interventions  oMedication (see MAR)    ADLs    Bathing  o   Linen Change  o   Personal Hygiene  o   Chlorhexidine Bath  o   Oral Care  o   Teeth/Dentures  o   Shave  o   Nutrition Percentage Eaten  oLunch, Between % Consumed  Environmental Precautions  o   Patient Turns/Positioning  oPatient Turns Self from Side to Side  Patient Turns Assistance/Tolerance  o   Bed Positions  o   Head of Bed Elevated  o       Psychosocial/Neurologic Assessment  Psychosocial Assessment  oPsychosocial (WDL):  Within Defined Limits  Neurologic Assessment  oNeuro (WDL): Exceptions to WDL  Level of Consciousness: Alert  Orientation Level: Oriented X4  Cognition: Follows commands  Speech: Clear  Pupil Assesment: No  Motor Function/Sensation Assessment: Motor strength, Sensation  RUE Sensation: Tingling, Numbness  Muscle Strength Right Arm: Good Strength Against Gravity and Moderate Resistance  LUE Sensation: Full sensation  Muscle Strength Left Arm: Good Strength Against Gravity and Moderate Resistance  RLE Sensation: Numbness, Tingling  Muscle Strength Right Leg: Good Strength Against Gravity and Moderate Resistance  LLE Sensation: Full sensation  Muscle Strength Left Leg: Good Strength Against Gravity and Moderate Resistance  oEENT (WDL):  WDL Except    Cardio/Pulmonary Assessment  Edema  o   Respiratory Breath Sounds  oRUL Breath Sounds: Clear  RML Breath Sounds: Clear  RLL Breath Sounds: Clear  EVELINA Breath Sounds: Clear  LLL Breath Sounds: Clear  Cardiac Assessment  oCardiac (WDL):  WDL Except

## 2024-02-09 NOTE — THERAPY
"Physical Therapy   Daily Treatment     Patient Name: Francesco Schulte  Age:  73 y.o., Sex:  male  Medical Record #: [id number]  Today's Date: 2/9/2024     Precautions  Precautions: (P) Fall Risk  Comments: (P) Per daughter report, \"underdiagnosed vascular dementia.\" Poor historian.    Subjective    Patient in bed and agreeable to therapy.     Objective       02/09/24 0901   PT Charge Group   PT Gait Training (Units) 1   PT Neuromuscular Re-Education / Balance (Units) 2   PT Therapeutic Activities (Units) 1   PT Total Time Spent   PT Individual Total Time Spent (Mins) 60   Precautions   Precautions Fall Risk   Comments Per daughter report, \"underdiagnosed vascular dementia.\" Poor historian.   Pain 0 - 10 Group   Location Rib Cage   Location Orientation Left   Gait Functional Level of Assist    Gait Level Of Assist Contact Guard Assist  (to SBA)   Assistive Device Single Point Cane   Distance (Feet) 200   # of Times Distance was Traveled 2   Deviation   (occasional LOBs, impaired RLE motor control causing decreased foot clearance)   Transfer Functional Level of Assist   Bed, Chair, Wheelchair Transfer Standby Assist   Bed Chair Wheelchair Transfer Description Increased time;Initial preparation for task;Supervision for safety  (stand step transfer with no AD vs. SPC)   Toilet Transfers Standby Assist   Toilet Transfer Description Grab bar;Adaptive equipment;Supervision for safety  (SPC approach)   Bed Mobility    Supine to Sit Supervised   Sit to Stand Supervised   Interdisciplinary Plan of Care Collaboration   IDT Collaboration with  Speech Therapist;Nursing   Patient Position at End of Therapy Seated  (with SLP)   Collaboration Comments CLOF, handoff of care to SLP     Toileting and ambulatory level with SPC and grab bar, standing hand hygiene with no UE support and SBA.    Dynamic balance activities using agility ladder with no UE support and SBA, CGA during LOBs:   - Forward walking with single foot in each box " x1 lap   - Lateral side stepping in box x1 lap each direction   - Alternating stepping out and into box x1 lap   - Two boxes forward, one box back x1 lap    Nustep with BUEs/BLEs level 10 for 10 minutes, 250 steps.    Assessment    Patient tolerated session well, focus of session on dynamic balance activities, with patient demonstrating timely and effective stepping strategies during LOBs. Tendency to perseverate on using peripheral vision to complete balance tasks.    Strengths: Willingly participates in therapeutic activities, Motivated for self care and independence  Barriers: Dementia, Impaired balance, Impaired activity tolerance, Fatigue (right foot drag with fatigue)    Plan    Standing balance, general strengthening/conditioning, gait training with SPC, stair/curb negotiation.      DME  PT DME Recommendations  Additional Equipment:  (LHS, reacher)     Passport items to be completed:  Get in/out of bed safely, in/out of a vehicle, safely use mobility device, walk or wheel around home/community, navigate up and down stairs, show how to get up/down from the ground, ensure home is accessible, demonstrate HEP, complete caregiver training    Physical Therapy Problems (Active)       Problem: Mobility       Dates: Start:  02/06/24         Goal: STG-Within one week, patient will ambulate community distances 200ft x 2 with left SPC SPV       Dates: Start:  02/06/24            Goal: STG-Within one week, patient will ascend and descend four to six stairs no rails with step over patterning SBA       Dates: Start:  02/06/24               Problem: Mobility Transfers       Dates: Start:  02/06/24         Goal: STG-Within one week, patient will transfer bed to chair SPV SPT       Dates: Start:  02/06/24               Problem: PT-Long Term Goals       Dates: Start:  02/06/24         Goal: LTG-By discharge, patient will ambulate 400ft left SPC mod I level surfaces, SPV outdoors       Dates: Start:  02/06/24            Goal:  LTG-By discharge, patient will transfer one surface to another mod I SPT safely and consistently       Dates: Start:  02/06/24            Goal: LTG-By discharge, patient will perform home exercise program seated/standing for LE strengthening to be done 3x/day in safe, independent home environment       Dates: Start:  02/06/24            Goal: LTG-By discharge, patient will up/down curb step to simulate home entrance with SPC with SPV       Dates: Start:  02/06/24

## 2024-02-09 NOTE — CARE PLAN
The patient is Stable - Low risk of patient condition declining or worsening    Shift Goals  Clinical Goals: safety  Patient Goals: sleep well, pain control    Progress made toward(s) clinical / shift goals:    Problem: Pain - Standard  Goal: Alleviation of pain or a reduction in pain to the patient’s comfort goal  Outcome: Progressing. Patient requested oxycodone at bedtime for low back pain, stated adequate relief. Patient denies pain this morning, has prn oxycodone available as needed.      Problem: Fall Risk - Rehab  Goal: Patient will remain free from falls  Outcome: Progressing. Patient bed in lowest locked position with bed alarm on and call light within reach. Patient calls appropriately with call light. Patient has not attempted to self transfer over shift.

## 2024-02-10 PROBLEM — M86.9 OSTEOMYELITIS OF SECOND TOE OF RIGHT FOOT (HCC): Status: ACTIVE | Noted: 2024-01-01

## 2024-02-10 PROBLEM — M19.079 OSTEOARTHRITIS OF TOE: Status: ACTIVE | Noted: 2024-01-01

## 2024-02-10 NOTE — THERAPY
"Occupational Therapy  Daily Treatment     Patient Name: Francesco Schulte  Age:  73 y.o., Sex:  male  Medical Record #: [id number]  Today's Date: 2/10/2024     Precautions  Precautions: Fall Risk  Comments: Per daughter report, \"underdiagnosed vascular dementia.\" Poor historian.         Subjective    Pt was alert and cooperative.      Objective       02/10/24 0931   Precautions   Precautions Fall Risk   Comments Per daughter report, \"underdiagnosed vascular dementia.\" Poor historian.   Vitals   Pulse 78   Patient BP Position Sitting   Respiration 18   Pulse Oximetry 96 %   O2 (LPM) 0   O2 Delivery Device Room air w/o2 available   Non Verbal Descriptors   Non Verbal Scale  Calm;Unlabored Breathing   Cognition    Level of Consciousness Alert   ABS (Agitated Behavior Scale)   Agitated Behavior Scale Performed No   Functional Level of Assist   Grooming Standby Assist;Standing   Grooming Description Increased time;Standing at sink;Supervision for safety   Bathing   (Declined shower)   Toileting Standby Assist   Toileting Description Grab bar;Increased time;Supervision for safety   Toilet Transfers Standby Assist   Toilet Transfer Description Increased time;Grab bar;Supervision for safety   Standing Upper Body Exercises   Other Exercises Standing on foam balance pad at FluidoBike level 3 CGA via gait belt.  5 mins, seated rest break x 2, no LOB,   Comments Noted CGA via gait belt SPC to/from bedroom and gym 270'x2 w/ no LOB nor rest breaks.   Neuro-Muscular Treatments   Comments Parallel Bar (PB) obstacle course (at patient request for standing/ambulating/balance activity). Between 12' span of PB's - 15 degree wedge/3\" bolster/5\" platform/3\" bolster/bosu ball.  Pt to navigate 12' to opposite end - stepping onto 15 degree wedge, then over bolster, stepping onto 5\" platform, stepping down to floor, stepping over bolster onto bosu ball. Then returning to starting position. CGA via gait belt, no LOB, completed x 10 w/ " seated rest break x 4 for 240'.   Bed Mobility    Supine to Sit Supervised   Sit to Supine Supervised   Scooting Supervised         Assessment    OT tx emphasis on ADL's, functional mobility, Parallel bar activity and R hand FM coordination/dexterity - see notes above for details.  Strengths: Adequate strength, Alert and oriented, Good insight into deficits/needs, Independent prior level of function, Motivated for self care and independence, Pleasant and cooperative, Supportive family, Willingly participates in therapeutic activities  Barriers: Impaired activity tolerance, Impaired balance    Plan    Refer to primary OT POC.    DME  OT DME Recommendations  Bathroom Equipment:  (shower chair)  Additional Equipment:  (LHS, reacher)    Passport items to be completed:  Perform bathroom transfers, complete dressing, complete feeding, get ready for the day, prepare a simple meal, participate in household tasks, adapt home for safety needs, demonstrate home exercise program, complete caregiver training     Occupational Therapy Goals (Active)       Problem: Dressing       Dates: Start:  02/06/24         Goal: STG-Within one week, patient will dress UB and LB at [facility] using LRD       Dates: Start:  02/06/24               Problem: Functional Transfers       Dates: Start:  02/06/24         Goal: STG-Within one week, patient will transfer to toilet at [facility] to [facility] using LRD       Dates: Start:  02/06/24            Goal: STG-Within one week, patient will transfer to tub/shower at [facility] to [facility] using LRD       Dates: Start:  02/06/24               Problem: IADL's       Dates: Start:  02/06/24         Goal: STG-Within one week, patient will access kitchen area at [facility] to [facility] using LRD       Dates: Start:  02/06/24               Problem: OT Long Term Goals       Dates: Start:  02/06/24         Goal: LTG-By discharge, patient will complete basic self care tasks at [facility] to independent        Dates: Start:  02/06/24            Goal: LTG-By  discharge, patient will perform bathroom transfers at [facility] to independent        Dates: Start:  02/06/24            Goal: LTG-By discharge, patient will complete basic home management at [facility] to [facility]        Dates: Start:  02/06/24               Problem: Toileting       Dates: Start:  02/06/24         Goal: STG-Within one week, patient will complete toileting tasks at [facility] to [facility] using LRD       Dates: Start:  02/06/24

## 2024-02-10 NOTE — THERAPY
Missed Therapy    Patient Name: Francesco Schulte  Age:  73 y.o., Sex:  male  Medical Record #: [id number]  Today's Date: 2/10/2024    Discussed missed therapy with RN, MD, therapy        02/10/24 5422   Therapy Missed   Missed Therapy (Minutes) 60   Reason For Missed Therapy Medical - Patient on Hold from Therapy  (RN reports patient has been having the chills, is packed in warm blankets and hot packs, has received tylenol. RN also reports patient is pending IV antibiotics and had a palliative consult earlier today. RN requests I leave the patient alone.)

## 2024-02-10 NOTE — CARE PLAN
"The patient is Stable - Low risk of patient condition declining or worsening    Shift Goals  Clinical Goals: safety  Patient Goals: sleep well, pain control    Progress made toward(s) clinical / shift goals:      Problem: Pain - Standard  Goal: Alleviation of pain or a reduction in pain to the patient’s comfort goal  Outcome: Progressing  Note: Roxicodone 10mg given PRN per MAR for c/o left rib cage and back pain with adequate relief. Pt sleeps good. Will continue to monitor.     Problem: Fall Risk - Rehab  Goal: Patient will remain free from falls  Outcome: Progressing  Note: Ghazal Mcelroy Fall risk Assessment Score: 15    High fall risk Interventions   - Alarming seatbelt  - Bed and strip alarm   - Yellow sign by the door   - Yellow wrist band \"Fall risk\"  - Room near to the nurse station  - Do not leave patient unattended in the bathroom  - Fall risk education provided    Pt calls appropriately when in need of assistance.       Patient is not progressing towards the following goals:      "

## 2024-02-10 NOTE — PROGRESS NOTES
"                                                         NURSING DAILY NOTE    Name: Francesco Schulte   Date of Admission: 2/5/2024   Admitting Diagnosis: Left thalamic infarction (HCC)  Attending Physician: Jorgito Starks M.d.  Allergies: Atorvastatin, Doxycycline, Lisinopril, Simvastatin, Statins [hmg-coa-r inhibitors], Beta adrenergic blockers, Eplerenone, Metformin, and Spironolactone    Safety  Patient Assist  mod  Patient Precautions  Fall Risk  Precaution Comments  Per daughter report, \"underdiagnosed vascular dementia.\" Poor historian.  Bed Transfer Status  Standby Assist  Toilet Transfer Status   Standby Assist  Assistive Devices  Rails, Wheelchair  Oxygen  None - Room Air  Diet/Therapeutic Dining  Current Diet Order   Procedures    Diet Order Diet: Cardiac; Second Modifier: (optional): Consistent CHO (Diabetic)     Pill Administration  whole  Agitated Behavioral Scale  17  ABS Level of Severity  No Agitation    Fall Risk  Has the patient had a fall this admission?   No  Ghazal Mcelroy Fall Risk Scoring  15, HIGH RISK  Fall Risk Safety Measures  bed alarm, chair alarm, poor balance, and low vision/ hearing    Vitals  Temperature: 36.4 °C (97.6 °F)  Temp src: Oral  Pulse: 74  Respiration: 18  Blood Pressure : 133/75  Blood Pressure MAP (Calculated): 94 MM HG  BP Location: Right, Upper Arm  Patient BP Position: Supine     Oxygen  Pulse Oximetry: 95 %  O2 (LPM): 0  O2 Delivery Device: None - Room Air    Bowel and Bladder  Last Bowel Movement  02/09/24  Stool Type  Type 4: Like a sausage or snake, smooth and soft  Bowel Device  Bathroom  Continent  Bladder: Did not void (no void since admit)   Bowel: No movement (no BM since admit)  Bladder Function  Urine Void (mL): 200 ml  Number of Times Voided: 1  Urine Color: Unable To Evaluate  Genitourinary Assessment   Bladder Assessment (WDL):  WDL Except  Andres Catheter: Not Applicable  Urine Color: Unable To Evaluate  Bladder Device: Bathroom, " Urinal  Bladder Scan: Post Void  $ Bladder Scan Results (mL): 89  Bladder Medications: Yes    Skin  Neal Score   18  Sensory Interventions   Bed Types: Standard/Trauma Mattress  Skin Preventative Measures: Waffle Overlay, Pillows in Use for Support / Positioning  Moisture Interventions         Pain  Pain Rating Scale  7 - Focus of attention, prevents doing daily activities  Pain Location  Rib Cage  Pain Location Orientation  Left  Pain Interventions   Medication (see MAR)    ADLs    Bathing      Linen Change      Personal Hygiene     Chlorhexidine Bath      Oral Care     Teeth/Dentures     Shave     Nutrition Percentage Eaten  Lunch, Between % Consumed  Environmental Precautions     Patient Turns/Positioning  Patient Turns Self from Side to Side  Patient Turns Assistance/Tolerance     Bed Positions     Head of Bed Elevated         Psychosocial/Neurologic Assessment  Psychosocial Assessment  Psychosocial (WDL):  Within Defined Limits  Neurologic Assessment  Neuro (WDL): Exceptions to WDL  Level of Consciousness: Alert  Orientation Level: Oriented X4  Cognition: Follows commands  Speech: Clear  Pupil Assesment: No  Motor Function/Sensation Assessment: Motor strength, Sensation  RUE Sensation: Tingling, Numbness  Muscle Strength Right Arm: Good Strength Against Gravity and Moderate Resistance  LUE Sensation: Full sensation  Muscle Strength Left Arm: Good Strength Against Gravity and Moderate Resistance  RLE Sensation: Numbness, Tingling  Muscle Strength Right Leg: Good Strength Against Gravity and Moderate Resistance  LLE Sensation: Full sensation  Muscle Strength Left Leg: Good Strength Against Gravity and Moderate Resistance  EENT (WDL):  WDL Except    Cardio/Pulmonary Assessment  Edema      Respiratory Breath Sounds  RUL Breath Sounds: Clear  RML Breath Sounds: Diminished  RLL Breath Sounds: Diminished  EVELINA Breath Sounds: Clear  LLL Breath Sounds: Diminished  Cardiac Assessment   Cardiac (WDL):  WDL  Except

## 2024-02-10 NOTE — PROGRESS NOTES
[facility] Medicine Daily Progress Note    Date of Service  2/10/2024    Chief Complaint:  Hypertension  Diabetes    Interval History:  No complaints.  Doing ok.  Has fabrication.    Review of Systems  Review of Systems   Constitutional:  Negative for fever.   Eyes:  Negative for blurred vision.   Respiratory:  Negative for shortness of breath.    Cardiovascular:  Negative for palpitations.   Gastrointestinal:  Negative for nausea and vomiting.   Neurological:  Negative for dizziness and headaches.   Psychiatric/Behavioral:  Negative for hallucinations.         Physical Exam  Temp:  [36.4 °C (97.6 °F)-37.1 °C (98.8 °F)] 36.4 °C (97.6 °F)  Pulse:  [74-97] 74  Resp:  [16-18] 18  BP: (127-135)/(75-81) 133/75  SpO2:  [95 %-97 %] 95 %    Physical Exam  Vitals and nursing note reviewed.   Constitutional:       General: He is not in acute distress.  HENT:      Mouth/Throat:      Mouth: Mucous membranes are moist.      Pharynx: Oropharynx is clear.   Eyes:      General: No scleral icterus.  Cardiovascular:      Rate and Rhythm: Normal rate and regular rhythm.   Pulmonary:      Effort: Pulmonary effort is normal.      Breath sounds: No wheezing or rales.   Abdominal:      General: Bowel sounds are normal.      Palpations: Abdomen is soft.   Musculoskeletal:      Cervical back: No rigidity.      Right lower leg: No edema.      Left lower leg: No edema.   Skin:     General: Skin is warm and dry.   Neurological:      Mental Status: He is alert and oriented to person, place, and time.      Comments: Has dementia and fabricates.   Psychiatric:         Mood and Affect: Mood normal.         Behavior: Behavior normal.         Fluids    Intake/Output Summary (Last 24 hours) at 2/10/2024 0914  Last data filed at 2/10/2024 0528  Gross per 24 hour   Intake 360 ml   Output 200 ml   Net 160 ml         Laboratory                            Imaging    Assessment/Plan  * Left thalamic infarction (HCC)- (present on admission)  Assessment &  Plan  Cont ASA  Cont Eliquis    Osteomyelitis of second toe of right foot ([facility])  Assessment & Plan  Per xrays  If pt doesn't go home on Hospice, ID will be consulted with  further management    Dementia ([facility])  Assessment & Plan  Has had dementia for about 2 years  Pt fabricates things at times    CHF (congestive heart failure) ([facility])  Assessment & Plan  Has hx  Has hx of AICD  BNP: 3537 (4/2023) --> 3703 (2/2024)  Cont Eliquis  Echo (1/25): EF 25%, RVSP 53    Paroxysmal A-fib ([facility])  Assessment & Plan  HR ok  Last few EKG's showed normal sinus rhythm  Not on any rate controlling meds  Cont Eliquis  Cont to monitor    Vitamin D insufficiency  Assessment & Plan  Vit D: 27  Cont supplements    Chest pain- (present on admission)  Assessment & Plan  Had CP on 2/7  Trop: 79 (within baseline recently)  BNP: 3537 (4/2023) --> 3703 (2/8/2024)  EKG: showed SR; no ST elevations or depression  Has hx of on & off CP    Had recent CP at [facility]:  Trops were elevated up to 83 and returned to 82  He reported ongoing chest pain  Case was discussed with on-call Cardiologist Dr. Nadeem Silver confirmed patient has an LBBB but no STEMI, there was no warrant for any angiography  Patient's troponinemia is from demand ischemia     Essential hypertension- (present on admission)  Assessment & Plan  BP ok   Cont Norvasc: 5 mg daily --> 7.5 mg daily (2/8)  Cont to monitor    Type 2 diabetes mellitus with hyperglycemia ([facility])- (present on admission)  Assessment & Plan  Hba1c: 5.9 (2/6)  BS a little labile but ok  Cont Glargine: 10 units qam  Note: home meds includes Tresiba 10 units qd  Cont to monitor

## 2024-02-10 NOTE — THERAPY
"Speech Language Pathology  Daily Treatment     Patient Name: Francesco Schulte  Age:  73 y.o., Sex:  male  Medical Record #: [id number]  Today's Date: 2/10/2024     Precautions  Precautions: Fall Risk  Comments: Per daughter report, \"underdiagnosed vascular dementia.\" Poor historian.    Subjective    Patient pleasant and agreeable to SLP services upon arrival.     Objective       02/10/24 0831   Treatment Charges   SLP Cognitive Skill Development First 15 Minutes 1   SLP Cognitive Skill Development Additional 15 Minutes 1   SLP Total Time Spent   SLP Individual Total Time Spent (Mins) 30   Precautions   Precautions Fall Risk   Comments Per daughter report, \"underdiagnosed vascular dementia.\" Poor historian.   Cognition   Medication Management  Supervision (5)   Interdisciplinary Plan of Care Collaboration   IDT Collaboration with  Nursing   Patient Position at End of Therapy In Bed;Bed Alarm On;Call Light within Reach;Tray Table within Reach;Phone within Reach   Collaboration Comments RN administered medications during session       Assessment    Attempted to complete second trial of fx medication sorting activity this date; patient reluctant to participate and cites that he will not have to manage medications at home. However, patient noted by his daughter to have suspected \"underdiagnosed vascular dementia\", and the patient remains to be a questionable historian. Despite attempts to educate patient on the importance of adequate medication management upon d/c, he was unable to report who would be assisting in managing his medications. Patient eventually began speaking nonsensically and tangentially about his time in the  and \"securities clearances\", expressing that this clinician \"does not have clearance to do the things [the patient] does\". Despite attempts, patient was unable to remain attentive to topics of medication management and d/c planning; appeared to become more confused as session " progressed.    Strengths: Able to follow instructions, Alert and oriented, Effective communication skills, Independent prior level of function, Willingly participates in therapeutic activities, Supportive family  Barriers: Impaired functional cognition    Plan    Re-attempt fx medication sort.    Passport items to be completed:  Express basic needs, understand food/liquid recommendations, consistently follow swallow precautions, manage finances, manage medications, arrive to therapy appointments on time, complete daily memory log entries, solve problems related to safety situations, review education related to hospitalization, complete caregiver training     Speech Therapy Problems (Active)       Problem: Memory STGs       Dates: Start:  02/06/24         Goal: STG-Within one week, patient will implement functional memory strategies with 80% accuracy provided MIN cues.       Dates: Start:  02/06/24               Problem: Problem Solving STGs       Dates: Start:  02/06/24         Goal: STG-Within one week, patient will complete functional medication and financial management tasks with 80% accuracy with MIN cues.       Dates: Start:  02/06/24               Problem: Speech/Swallowing LTGs       Dates: Start:  02/06/24         Goal: LTG-By discharge, patient will complete functional problem solving and recall information with 80% accuracy provided MOD I.       Dates: Start:  02/06/24

## 2024-02-10 NOTE — PROGRESS NOTES
"                                                         NURSING DAILY NOTE    Name: Francesco Schulte   Date of Admission: 2/5/2024   Admitting Diagnosis: Left thalamic infarction (HCC)  Attending Physician: Jorgito Starks M.d.  Allergies: Atorvastatin, Doxycycline, Lisinopril, Simvastatin, Statins [hmg-coa-r inhibitors], Beta adrenergic blockers, Eplerenone, Metformin, and Spironolactone    Safety  Patient Assist  mod  Patient Precautions  Fall Risk  Precaution Comments  Per daughter report, \"underdiagnosed vascular dementia.\" Poor historian.  Bed Transfer Status  Standby Assist  Toilet Transfer Status   Standby Assist  Assistive Devices  Rails, Wheelchair  Oxygen  None - Room Air  Diet/Therapeutic Dining  Current Diet Order   Procedures    Diet Order Diet: Cardiac; Second Modifier: (optional): Consistent CHO (Diabetic)     Pill Administration  whole  Agitated Behavioral Scale  15  ABS Level of Severity  No Agitation    Fall Risk  Has the patient had a fall this admission?   No  Ghazal Mcelroy Fall Risk Scoring  15, HIGH RISK  Fall Risk Safety Measures  bed alarm and chair alarm    Vitals  Temperature: 37.1 °C (98.8 °F)  Temp src: Oral  Pulse: 83  Respiration: 18  Blood Pressure : 135/81  Blood Pressure MAP (Calculated): 99 MM HG  BP Location: Right, Upper Arm  Patient BP Position: Supine     Oxygen  Pulse Oximetry: 97 %  O2 (LPM): 0  O2 Delivery Device: None - Room Air    Bowel and Bladder  Last Bowel Movement  02/06/24  Stool Type  Type 6: Fluffy pieces with ragged edges, a mushy stool  Bowel Device  Bathroom  Continent  Bladder: Did not void (no void since admit)   Bowel: No movement (no BM since admit)  Bladder Function  Urine Void (mL): 200 ml  Urine Color: Unable To Evaluate  Genitourinary Assessment   Bladder Assessment (WDL):  WDL Except  Andres Catheter: Not Applicable  Urine Color: Unable To Evaluate  Bladder Device: Bathroom, Urinal  Bladder Scan: Post Void  $ Bladder Scan Results (mL): " 89  Bladder Medications: Yes    Skin  Neal Score   18  Sensory Interventions   Bed Types: Standard/Trauma Mattress with Overlay  Skin Preventative Measures: Waffle Overlay, Pillows in Use for Support / Positioning  Moisture Interventions         Pain  Pain Rating Scale  8 - Awful, hard to do anything  Pain Location  Rib Cage  Pain Location Orientation  Left  Pain Interventions   Medication (see MAR), Repositioned    ADLs    Bathing      Linen Change      Personal Hygiene     Chlorhexidine Bath      Oral Care     Teeth/Dentures     Shave     Nutrition Percentage Eaten  Lunch, Between % Consumed  Environmental Precautions     Patient Turns/Positioning  Patient Turns Self from Side to Side  Patient Turns Assistance/Tolerance     Bed Positions     Head of Bed Elevated         Psychosocial/Neurologic Assessment  Psychosocial Assessment  Psychosocial (WDL):  Within Defined Limits  Neurologic Assessment  Neuro (WDL): Exceptions to WDL  Level of Consciousness: Alert  Orientation Level: Oriented X4  Cognition: Follows commands  Speech: Clear  Pupil Assesment: No  Motor Function/Sensation Assessment: Motor strength, Sensation  RUE Sensation: Tingling, Numbness  Muscle Strength Right Arm: Good Strength Against Gravity and Moderate Resistance  LUE Sensation: Full sensation  Muscle Strength Left Arm: Good Strength Against Gravity and Moderate Resistance  RLE Sensation: Numbness, Tingling  Muscle Strength Right Leg: Good Strength Against Gravity and Moderate Resistance  LLE Sensation: Full sensation  Muscle Strength Left Leg: Good Strength Against Gravity and Moderate Resistance  EENT (WDL):  WDL Except    Cardio/Pulmonary Assessment  Edema      Respiratory Breath Sounds  RUL Breath Sounds: Clear  RML Breath Sounds: Clear  RLL Breath Sounds: Clear  EVELINA Breath Sounds: Clear  LLL Breath Sounds: Clear  Cardiac Assessment   Cardiac (WDL):  WDL Except

## 2024-02-10 NOTE — HOSPICE
[facility] Hospice referral/consult response    Is this patient accepted to [facility]?: TBD  What hospice level of care?: Routine/community  Approved by provider: SRIRAM    Anticipated DC date: TBD  DC Barriers:  Additional Information:  (no hospice order/referral): Met with spouse, Alysa and daughter, Aretha at [facility] per request for information about hospice services and palliative care. Provided with basic information on out-patient palliative care services and in-depth hospice services information. Aretha lives in [place] and Alysa would likely be the main caregiver if patient is approved for hospice services and they decided to proceed. Possible placement also discussed. Aretha noted that patient may not still be ready for hospice. Bob has on-going rehabilitation until 2/16 and seems to be doing well. Family to discuss and will request for either Palliative care or Hospice referral or maybe both. Provided them with Hospice reading material and phone number.

## 2024-02-11 NOTE — CARE PLAN
"With foot drop prevention support in place.  Problem: Pain - Standard  Goal: Alleviation of pain or a reduction in pain to the patient’s comfort goal  Outcome: Progressing  Note: Assessed for pain and discomfort , medicated with oxycodone 5 mg with relief[ see mar] , trazodone for slep, pt was able to sleep well.       Problem: Fall Risk - Rehab  Goal: Patient will remain free from falls  Outcome: Progressing  Note: Ghazal Mcelroy Fall risk Assessment Score: 19    High fall risk Interventions   - Alarming seatbelt  - Bed and strip alarm   - Yellow sign by the door   - Yellow wrist band \"Fall risk\"  - Room near to the nurse station  - Do not leave patient unattended in the bathroom  - Fall risk education provided      The patient is Stable - Low risk of patient condition declining or worsening    Shift Goals  Clinical Goals: safety  Patient Goals: sleep well, pain control    Progress made toward(s) clinical / shift goals:  Pt free from fall and injury.    "

## 2024-02-11 NOTE — PROGRESS NOTES
"                                                         NURSING DAILY NOTE    Name: Francesco Schulte   Date of Admission: 2/5/2024   Admitting Diagnosis: Left thalamic infarction (HCC)  Attending Physician: Jorgito Starks M.d.  Allergies: Atorvastatin, Doxycycline, Lisinopril, Simvastatin, Statins [hmg-coa-r inhibitors], Beta adrenergic blockers, Eplerenone, Metformin, and Spironolactone    Safety  Patient Assist  mod  Patient Precautions  Fall Risk  Precaution Comments  Per daughter report, \"underdiagnosed vascular dementia.\" Poor historian.  Bed Transfer Status  Standby Assist  Toilet Transfer Status   Standby Assist  Assistive Devices  Rails, Wheelchair  Oxygen  None - Room Air  Diet/Therapeutic Dining  Current Diet Order   Procedures    Diet Order Diet: Cardiac; Second Modifier: (optional): Consistent CHO (Diabetic)     Pill Administration  whole  Agitated Behavioral Scale  17  ABS Level of Severity  No Agitation    Fall Risk  Has the patient had a fall this admission?   No  Ghazal Mcelroy Fall Risk Scoring  19, HIGH RISK  Fall Risk Safety Measures  bed alarm, chair alarm, poor balance, and low vision/ hearing    Vitals  Temperature: 36.3 °C (97.4 °F)  Temp src: Oral  Pulse: 76  Respiration: 18  Blood Pressure : 122/71  Blood Pressure MAP (Calculated): 88 MM HG  BP Location: Upper Arm, Left  Patient BP Position: Supine     Oxygen  Pulse Oximetry: 94 %  O2 (LPM): 0  O2 Delivery Device: None - Room Air    Bowel and Bladder  Last Bowel Movement  02/09/24  Stool Type  Type 4: Like a sausage or snake, smooth and soft  Bowel Device  Bathroom  Continent  Bladder: Did not void (no void since admit)   Bowel: No movement (no BM since admit)  Bladder Function  Urine Void (mL): 500 ml  Number of Times Voided: 2  Urine Color: Unable To Evaluate  Genitourinary Assessment   Bladder Assessment (WDL):  WDL Except  Andres Catheter: Not Applicable  Urine Color: Unable To Evaluate  Bladder Device: Bathroom  Bladder " Scan: Post Void  $ Bladder Scan Results (mL): 139  Bladder Medications: Yes    Skin  Neal Score   18  Sensory Interventions   Bed Types: Standard/Trauma Mattress  Skin Preventative Measures: Waffle Overlay  Moisture Interventions         Pain  Pain Rating Scale  6 - Hard to ignore, avoid usual activities  Pain Location  Back, Rib Cage  Pain Location Orientation  Left  Pain Interventions   Medication (see MAR)    ADLs    Bathing      Linen Change      Personal Hygiene     Chlorhexidine Bath      Oral Care     Teeth/Dentures     Shave     Nutrition Percentage Eaten  Lunch, Between % Consumed  Environmental Precautions     Patient Turns/Positioning  Patient Turns Self from Side to Side  Patient Turns Assistance/Tolerance     Bed Positions     Head of Bed Elevated         Psychosocial/Neurologic Assessment  Psychosocial Assessment  Psychosocial (WDL):  Within Defined Limits  Neurologic Assessment  Neuro (WDL): Exceptions to WDL  Level of Consciousness: Alert  Orientation Level: Oriented X4  Cognition: Follows commands  Speech: Clear  Pupil Assesment: No  Motor Function/Sensation Assessment: Motor strength, Sensation  RUE Sensation: Tingling, Numbness  Muscle Strength Right Arm: Good Strength Against Gravity and Moderate Resistance  LUE Sensation: Full sensation  Muscle Strength Left Arm: Good Strength Against Gravity and Moderate Resistance  RLE Sensation: Numbness, Tingling  Muscle Strength Right Leg: Good Strength Against Gravity and Moderate Resistance  LLE Sensation: Full sensation  Muscle Strength Left Leg: Good Strength Against Gravity and Moderate Resistance  EENT (WDL):  WDL Except    Cardio/Pulmonary Assessment  Edema      Respiratory Breath Sounds  RUL Breath Sounds: Clear  RML Breath Sounds: Diminished  RLL Breath Sounds: Diminished  EVELINA Breath Sounds: Clear  LLL Breath Sounds: Diminished  Cardiac Assessment   Cardiac (WDL):  WDL Except

## 2024-02-11 NOTE — FLOWSHEET NOTE
02/10/24 1450   Events/Summary/Plan   Events/Summary/Plan RA pulse ox check   Vital Signs   Pulse 85   Respiration 16   Pulse Oximetry 97 %   $ Pulse Oximetry (Spot Check) Yes   Respiratory Assessment   Level of Consciousness Alert   Chest Exam   Work Of Breathing / Effort Within Normal Limits   Oxygen   O2 Delivery Device Room air w/o2 available

## 2024-02-11 NOTE — PROGRESS NOTES
RN in   bedside with therapist. Pt refused therapy at this time, pt reports unable to do therapy due to pain, pain meds are on board as per order, pt vital signs taken at bedside all within normal limits, pt encouraged to try in bed therapy, pt refuses to try therapy in bed.

## 2024-02-11 NOTE — THERAPY
"Physical Therapy   Daily Treatment     Patient Name: Francesco Schulte  Age:  73 y.o., Sex:  male  Medical Record #: [id number]  Today's Date: 2/11/2024     Precautions  Precautions: (P) Fall Risk  Comments: (P) Per daughter report, \"underdiagnosed vascular dementia.\" Poor historian.    Subjective    \"Therapy is cancelled today; no one talks around here\".   \"Get me a new heart\".  \"I can't participated with my heart like this\".  Pt declined transfer OOB, and bed exercise.      Objective       02/11/24 1331   Therapy Missed   Missed Therapy (Minutes) 45   Reason For Missed Therapy Non-Medical - Patient Refused  (\"Therapy is cancelled today; no one talks around here\". Pt reported that he cannot participate with his heart this way; holding chest. RN reported that work up have been done, no medical hold currently. Pt awaiting palliative care consult.)   PT Charge Group   PT Therapeutic Activities (Units) 1   PT Total Time Spent   PT Individual Total Time Spent (Mins) 15   Precautions   Precautions Fall Risk   Comments Per daughter report, \"underdiagnosed vascular dementia.\" Poor historian.   Vitals   Pulse 69   Patient BP Position Supine   Blood Pressure  134/71   Pain 0 - 10 Group   Therapist Pain Assessment Nurse Notified   Transfer Functional Level of Assist   Bed, Chair, Wheelchair Transfer Refused   Supine Lower Body Exercise   Comments Refused- attempted ankle pumps   Bed Mobility    Supine to Sit Refused   Interdisciplinary Plan of Care Collaboration   IDT Collaboration with  Physician;Nursing   Patient Position at End of Therapy In Bed;Tray Table within Reach;Call Light within Reach   Collaboration Comments RN came in with therapist to coax participation; explain that he is ok to participate. RN explained that his heart has been assessed. Patient adamently refused ability to participate.         Assessment    Pt refused participation upon PT arrival d/t his \"heart\", while holding his chest. RN confirmed that " patient is not currently on medical hold, and has had several heart work ups to date. He is ok to participate. Vitals were assessed; RN assisted in attempting to coax participation. Pt adamantly refused all offered options, including bed exercise. Per RN, pt is awaiting palliative consult.     Strengths: Willingly participates in therapeutic activities, Motivated for self care and independence  Barriers: Dementia, Impaired balance, Impaired activity tolerance, Fatigue (right foot drag with fatigue)    Plan    Standing balance, general strengthening/conditioning, gait training with SPC, stair/curb negotiation, OOB tolerance      DME  PT DME Recommendations  Additional Equipment:  (LHS, reacher)    Passport items to be completed:  Get in/out of bed safely, in/out of a vehicle, safely use mobility device, walk or wheel around home/community, navigate up and down stairs, show how to get up/down from the ground, ensure home is accessible, demonstrate HEP, complete caregiver training    Physical Therapy Problems (Active)       Problem: Mobility       Dates: Start:  02/06/24         Goal: STG-Within one week, patient will ambulate community distances 200ft x 2 with left SPC SPV       Dates: Start:  02/06/24            Goal: STG-Within one week, patient will ascend and descend four to six stairs no rails with step over patterning SBA       Dates: Start:  02/06/24               Problem: Mobility Transfers       Dates: Start:  02/06/24         Goal: STG-Within one week, patient will transfer bed to chair SPV SPT       Dates: Start:  02/06/24               Problem: PT-Long Term Goals       Dates: Start:  02/06/24         Goal: LTG-By discharge, patient will ambulate 400ft left SPC mod I level surfaces, SPV outdoors       Dates: Start:  02/06/24            Goal: LTG-By discharge, patient will transfer one surface to another mod I SPT safely and consistently       Dates: Start:  02/06/24            Goal: LTG-By discharge,  patient will perform home exercise program seated/standing for LE strengthening to be done 3x/day in safe, independent home environment       Dates: Start:  02/06/24            Goal: LTG-By discharge, patient will up/down curb step to simulate home entrance with SPC with SPV       Dates: Start:  02/06/24

## 2024-02-11 NOTE — THERAPY
"Missed Therapy    Patient Name: Francesco Schulte  Age:  73 y.o., Sex:  male  Medical Record #: [id number]  Today's Date: 2/11/2024    Discussed missed therapy with PT and MD. Patient not participating today due to c/o his heart beating too fast and \"he can't do therapy until his oncologist fixes his heart\"       02/11/24 1231   Therapy Missed   Missed Therapy (Minutes) 30   Reason For Missed Therapy Non-Medical - Patient Refused  (Patient refused stating that \"He cannot do therapy until his Oncologist fixes his heart\")       "

## 2024-02-11 NOTE — CARE PLAN
Problem: Knowledge Deficit - Standard  Goal: Patient and family/care givers will demonstrate understanding of plan of care, disease process/condition, diagnostic tests and medications  Outcome: Progressing   Pt education given regarding plan of care with emphasis on adequate hydration, pt shows poor understanding and follow through, will continue to reinforce education and continue to monitor.         Problem: Fall Risk - Rehab  Goal: Patient will remain free from falls  Outcome: Progressing   Pt education given regarding fall precautions AND safety measures, pt shows some understanding, has not attempted to self transfer this shift, will continue to reinforce education and continue to monitor.

## 2024-02-11 NOTE — PROGRESS NOTES
"                                                         NURSING DAILY NOTE    Name: Francesco Schulte   Date of Admission: 2/5/2024   Admitting Diagnosis: Left thalamic infarction (HCC)  Attending Physician: Jorgito Starks M.d.  Allergies: Atorvastatin, Doxycycline, Lisinopril, Simvastatin, Statins [hmg-coa-r inhibitors], Beta adrenergic blockers, Eplerenone, Metformin, and Spironolactone    Safety  Patient Assist  mod  Patient Precautions  Fall Risk  Precaution Comments  Per daughter report, \"underdiagnosed vascular dementia.\" Poor historian.  Bed Transfer Status  Standby Assist  Toilet Transfer Status   Standby Assist  Assistive Devices  Rails, Wheelchair  Oxygen  None - Room Air  Diet/Therapeutic Dining  Current Diet Order   Procedures    Diet Order Diet: Cardiac; Second Modifier: (optional): Consistent CHO (Diabetic)     Pill Administration  whole  Agitated Behavioral Scale  17  ABS Level of Severity  No Agitation    Fall Risk  Has the patient had a fall this admission?   No  Ghazal Mcelroy Fall Risk Scoring  19, HIGH RISK  Fall Risk Safety Measures  bed alarm, chair alarm, and poor balance    Vitals  Temperature: 36.8 °C (98.2 °F)  Temp src: Oral  Pulse: 95  Respiration: 18  Blood Pressure : (!) 145/87  Blood Pressure MAP (Calculated): 106 MM HG  BP Location: Right, Upper Arm  Patient BP Position: Supine     Oxygen  Pulse Oximetry: 98 %  O2 (LPM): 0  O2 Delivery Device: None - Room Air    Bowel and Bladder  Last Bowel Movement  02/09/24  Stool Type  Type 4: Like a sausage or snake, smooth and soft  Bowel Device  Bathroom  Continent  Bladder: Did not void (no void since admit)   Bowel: No movement (no BM since admit)  Bladder Function  Urine Void (mL): 200 ml  Number of Times Voided: 1  Urine Color: Unable To Evaluate  Genitourinary Assessment   Bladder Assessment (WDL):  WDL Except  Andres Catheter: Not Applicable  Urine Color: Unable To Evaluate  Bladder Device: Urinal  Bladder Scan: Post Void  $ " Bladder Scan Results (mL): 89  Bladder Medications: Yes    Skin  Neal Score   18  Sensory Interventions   Bed Types: Standard/Trauma Mattress with Overlay  Skin Preventative Measures: Waffle Overlay  Moisture Interventions         Pain  Pain Rating Scale  9 - Can't bear the pain, unable to do anything  Pain Location  Back, Rib Cage  Pain Location Orientation  Left  Pain Interventions   Medication (see MAR), Rest, Repositioned    ADLs    Bathing      Linen Change      Personal Hygiene     Chlorhexidine Bath      Oral Care     Teeth/Dentures     Shave     Nutrition Percentage Eaten  Lunch, Between % Consumed  Environmental Precautions     Patient Turns/Positioning  Patient Turns Self from Side to Side  Patient Turns Assistance/Tolerance     Bed Positions     Head of Bed Elevated         Psychosocial/Neurologic Assessment  Psychosocial Assessment  Psychosocial (WDL):  Within Defined Limits  Neurologic Assessment  Neuro (WDL): Exceptions to WDL  Level of Consciousness: Alert  Orientation Level: Oriented X4  Cognition: Follows commands  Speech: Clear  Pupil Assesment: No  Motor Function/Sensation Assessment: Motor strength, Sensation  RUE Sensation: Tingling, Numbness  Muscle Strength Right Arm: Good Strength Against Gravity and Moderate Resistance  LUE Sensation: Full sensation  Muscle Strength Left Arm: Good Strength Against Gravity and Moderate Resistance  RLE Sensation: Numbness, Tingling  Muscle Strength Right Leg: Good Strength Against Gravity and Moderate Resistance  LLE Sensation: Full sensation  Muscle Strength Left Leg: Good Strength Against Gravity and Moderate Resistance  EENT (WDL):  WDL Except    Cardio/Pulmonary Assessment  Edema      Respiratory Breath Sounds  RUL Breath Sounds: Clear  RML Breath Sounds: Diminished  RLL Breath Sounds: Diminished  EVELINA Breath Sounds: Clear  LLL Breath Sounds: Diminished  Cardiac Assessment   Cardiac (WDL):  WDL Except

## 2024-02-11 NOTE — PROGRESS NOTES
[facility] Medicine Daily Progress Note    Date of Service  2/11/2024    Chief Complaint:  Hypertension  Diabetes    Interval History:  No significant events overnight.    Review of Systems  Review of Systems   Constitutional:  Negative for fever.   Eyes:  Negative for blurred vision.   Respiratory:  Negative for cough.    Cardiovascular:  Negative for chest pain.   Gastrointestinal:  Negative for diarrhea.   Musculoskeletal:  Negative for joint pain.   Neurological:  Negative for dizziness.   Psychiatric/Behavioral:  The patient is not nervous/anxious.         Physical Exam  Temp:  [36.3 °C (97.4 °F)-36.8 °C (98.2 °F)] 36.3 °C (97.4 °F)  Pulse:  [76-95] (P) 78  Resp:  [16-18] 18  BP: (122-145)/(71-88) (P) 118/66  SpO2:  [94 %-98 %] 94 %    Physical Exam  Vitals and nursing note reviewed.   Constitutional:       Appearance: He is not diaphoretic.   HENT:      Head: Atraumatic.      Mouth/Throat:      Pharynx: No oropharyngeal exudate or posterior oropharyngeal erythema.   Eyes:      Extraocular Movements: Extraocular movements intact.   Neck:      Vascular: No carotid bruit.   Cardiovascular:      Rate and Rhythm: Normal rate and regular rhythm.   Pulmonary:      Effort: Pulmonary effort is normal.      Breath sounds: No wheezing or rales.   Abdominal:      General: There is no distension.      Palpations: Abdomen is soft.      Tenderness: There is no abdominal tenderness.   Musculoskeletal:      Right lower leg: No edema.      Left lower leg: No edema.   Skin:     General: Skin is warm and dry.   Neurological:      Mental Status: He is alert and oriented to person, place, and time.      Comments: Has dementia and fabricates.   Psychiatric:         Mood and Affect: Mood normal.         Behavior: Behavior normal.         Fluids    Intake/Output Summary (Last 24 hours) at 2/11/2024 3895  Last data filed at 2/11/2024 0453  Gross per 24 hour   Intake 300 ml   Output 700 ml   Net -400 ml         Laboratory                             Imaging    Assessment/Plan  * Left thalamic infarction (HCC)- (present on admission)  Assessment & Plan  Cont ASA  Cont Eliquis    Osteomyelitis of second toe of right foot (HCC)  Assessment & Plan  Per xrays  If pt doesn't go home on Hospice, ID will be consulted with  further management    Dementia ([facility])  Assessment & Plan  Has had dementia for about 2 years  Pt fabricates things at times    CHF (congestive heart failure) ([facility])  Assessment & Plan  Has hx  Has hx of AICD  BNP: 3537 (4/2023) --> 3703 (2/2024)  Cont Eliquis  Echo (1/25): EF 25%, RVSP 53    Paroxysmal A-fib ([facility])  Assessment & Plan  HR ok  Last few EKG's showed normal sinus rhythm  Not on any rate controlling meds  Cont Eliquis  Cont to monitor    Vitamin D insufficiency  Assessment & Plan  Vit D: 27  Cont supplements    Chest pain- (present on admission)  Assessment & Plan  Had CP on 2/7  Trop: 79 (within baseline recently)  BNP: 3537 (4/2023) --> 3703 (2/8/2024)  EKG: showed SR; no ST elevations or depression  Has hx of on & off CP    [place] [facility] [place]  [place]rops were elevated up to 83 and returned to 82  He reported ongoing chest pain  Case was discussed with on-call Cardiologist Dr. Nadeem Silver confirmed patient has an LBBB but no STEMI, there was no warrant for any angiography  Patient's troponinemia is from demand ischemia     Essential hypertension- (present on admission)  Assessment & Plan  BP ok   Cont Norvasc: 5 mg daily --> 7.5 mg daily (2/8)  Cont to monitor    Type 2 diabetes mellitus with hyperglycemia ([facility])- (present on admission)  Assessment & Plan  Hba1c: 5.9 (2/6)  BS a little labile but ok  Cont Glargine: 10 units qam  Note: home meds includes Tresiba 10 units qd  Cont to monitor

## 2024-02-12 NOTE — CARE PLAN
Problem: Knowledge Deficit - Standard  Goal: Patient and family/care givers will demonstrate understanding of plan of care, disease process/condition, diagnostic tests and medications  Outcome: Progressing   Pt education given regarding plan of care with emphasis on adequate hydration, pt shows poor understanding with very poor follow through, will continue to reinforce education and continue to monitor.         Problem: Fall Risk - Rehab  Goal: Patient will remain free from falls  Outcome: Progressing   Pt education given regarding fall precautions AND safety measures, pt shows poor understanding, has attempted self transfer  RN was able to redirect and did not self transfer, will continue to reinforce education and continue to monitor.      Neurosurgery Progress Note    Subjective:  POD #3 ALIF L5-S1  POD#1 L5-S1 Posterior Laminectomy/discectomy and Fusion.   Doing well with leg pain better.  Back expectedly sore.    Pain management fair.  Will adjust meds.  Drain output 60/8 hours       Has been passing gas.  No BM yet.  Wounds c/d/i    Exam:  Adb and Lumbar wounds C/D/I   Muscle strength 5/5,   DTRs +2   Sensory intact    BP  Min: 112/83  Max: 121/92  Pulse  Av  Min: 85  Max: 97  Resp  Avg: 15.2  Min: 12  Max: 20  Temp  Av.5 °C (97.7 °F)  Min: 36.2 °C (97.1 °F)  Max: 36.9 °C (98.5 °F)  SpO2  Av.1 %  Min: 94 %  Max: 100 %    No Data Recorded    Recent Labs      11/09/17   0145  11/10/17   0410   WBC  5.5  4.6*   RBC  2.63*  2.68*   HEMOGLOBIN  9.2*  9.7*   HEMATOCRIT  27.1*  28.2*   MCV  103.0*  105.2*   MCH  35.0*  36.2*   MCHC  33.9  34.4   RDW  44.6  45.5   PLATELETCT  162*  175   MPV  8.8*  8.8*     Recent Labs      11/09/17   0145  11/10/17   0410   SODIUM  135  135   POTASSIUM  3.8  4.3   CHLORIDE  104  104   CO2  25  23   GLUCOSE  109*  94   BUN  9  6*               Intake/Output       11/10/17 07 - 1759 17 - 17 0659       Total 1900-0659 Total       Intake    I.V.  500  1250 1750  --  -- --    Crystalloid Intake 500 -- 500 -- -- --    IV Piggyback Volume (ANCEF) -- 100 100 -- -- --    IV Volume (0.9% NS W/ 20 KCL MEQ) -- 1150 1150 -- -- --    Total Intake 500 1250 1750 -- -- --       Output    Urine  --  -- --  --  -- --    Number of Times Voided -- 2 x 2 x -- -- --    Drains  --  180 180  --  -- --    Hemovac 1 -- 180 180 -- -- --    Stool  --  -- --  --  -- --    Number of Times Stooled 110 x -- 110 x -- -- --    Total Output -- 180 180 -- -- --       Net I/O     500 1070 1570 -- -- --            Intake/Output Summary (Last 24 hours) at 17 0823  Last data filed at 17 0600   Gross per 24 hour   Intake             1750 ml   Output              180 ml   Net              1570 ml            • Pharmacy Consult Request  1 Each PRN   • MD ALERT...Do not administer NSAIDS or ASPIRIN unless ORDERED By Neurosurgery  1 Each PRN   • 0.9 % NaCl with KCl 20 mEq 1,000 mL   Continuous   • metoclopramide  10 mg TID AC   • oxycodone immediate-release  10 mg Q4HRS PRN    Or   • oxycodone immediate-release  20 mg Q4HRS PRN   • fluticasone  1 Spray DAILY   • lisinopril  20 mg DAILY   • loratadine  10 mg DAILY   • Pharmacy Consult Request  1 Each PRN   • MD ALERT...Do not administer NSAIDS or ASPIRIN unless ORDERED By Neurosurgery  1 Each PRN   • docusate sodium  100 mg BID   • senna-docusate  1 Tab Nightly   • senna-docusate  1 Tab Q24HRS PRN   • polyethylene glycol/lytes  1 Packet BID PRN   • magnesium hydroxide  30 mL QDAY PRN   • bisacodyl  10 mg Q24HRS PRN   • fleet  1 Each Once PRN   • dextrose 5 % and 0.45 % NaCl with KCl 20 mEq   Continuous   • diphenhydrAMINE  25 mg Q6HRS PRN    Or   • diphenhydrAMINE  25 mg Q6HRS PRN   • ondansetron  4 mg Q4HRS PRN   • ondansetron  4 mg Q4HRS PRN   • promethazine  12.5-25 mg Q4HRS PRN   • promethazine  12.5-25 mg Q4HRS PRN   • prochlorperazine  5-10 mg Q4HRS PRN   • methocarbamol  750 mg Q8HRS PRN   • alprazolam  0.25 mg BID PRN   • labetalol  10 mg Q HOUR PRN   • benzocaine-menthol  1 Lozenge Q2HRS PRN   • calcium carbonate  500 mg BID   • hydrocodone/acetaminophen  1-2 Tab Q4HRS PRN   • oxycodone-acetaminophen  1-2 Tab Q4HRS PRN   • hydromorphone  0.5 mg Q3HRS PRN       Assessment and Plan:  POD #3 ALIF L5-S1  POD#1 L5-S1 Posterior Laminectomy/discectomy and Fusion.   Pain management fair.  Will adjust meds.  PT to ambulate.  Keep drain today. Out tomorrow  Home tomorrow or Monday.

## 2024-02-12 NOTE — PROGRESS NOTES
"  Physical Medicine & Rehabilitation Progress Note    Encounter Date: 2/12/2024    Chief Complaint: Decreased mobility, weakness    Interval Events (Subjective):  Patient sitting up in room. He reports he is doing OK. He reports he needs to go home. Discussed we are still working on plan and family is talking with specialists.  Discussed with hospitalist and will discuss treatment of osteomyelitis if family is inclined to treat.      _____________________________________  Interdisciplinary Team Conference   Most recent IDT on 2/8/2024    Discharge Date/Disposition:  2/19/24  _____________________________________      Objective:  VITAL SIGNS: /70   Pulse 66   Temp 36.1 °C (97 °F) (Oral)   Resp 18   Ht 1.854 m (6' 1\")   Wt 80.7 kg (178 lb)   SpO2 97%   BMI 23.48 kg/m²   Gen: NAD  Psych: Mood and affect appropriate  CV: RRR, 0 edema  Resp: CTAB, no upper airway sounds  Abd: NTND  Neuro: AOx3, does not remember previous conversation, following commands    Laboratory Values:  Recent Results (from the past 72 hour(s))   POCT glucose device results    Collection Time: 02/09/24  5:25 PM   Result Value Ref Range    POC Glucose, Blood 135 (H) 65 - 99 mg/dL   POCT glucose device results    Collection Time: 02/09/24  8:42 PM   Result Value Ref Range    POC Glucose, Blood 200 (H) 65 - 99 mg/dL   POCT glucose device results    Collection Time: 02/10/24  7:24 AM   Result Value Ref Range    POC Glucose, Blood 103 (H) 65 - 99 mg/dL   POCT glucose device results    Collection Time: 02/10/24 11:02 AM   Result Value Ref Range    POC Glucose, Blood 188 (H) 65 - 99 mg/dL   POCT glucose device results    Collection Time: 02/10/24  5:19 PM   Result Value Ref Range    POC Glucose, Blood 115 (H) 65 - 99 mg/dL   POCT glucose device results    Collection Time: 02/10/24  8:58 PM   Result Value Ref Range    POC Glucose, Blood 194 (H) 65 - 99 mg/dL   POCT glucose device results    Collection Time: 02/11/24  8:02 AM   Result Value Ref " Range    POC Glucose, Blood 142 (H) 65 - 99 mg/dL   POCT glucose device results    Collection Time: 02/11/24 11:27 AM   Result Value Ref Range    POC Glucose, Blood 142 (H) 65 - 99 mg/dL   POCT glucose device results    Collection Time: 02/11/24  5:34 PM   Result Value Ref Range    POC Glucose, Blood 116 (H) 65 - 99 mg/dL   POCT glucose device results    Collection Time: 02/11/24 10:46 PM   Result Value Ref Range    POC Glucose, Blood 123 (H) 65 - 99 mg/dL   POCT glucose device results    Collection Time: 02/12/24  7:36 AM   Result Value Ref Range    POC Glucose, Blood 85 65 - 99 mg/dL   POCT glucose device results    Collection Time: 02/12/24 11:12 AM   Result Value Ref Range    POC Glucose, Blood 162 (H) 65 - 99 mg/dL       Medications:  Scheduled Medications   Medication Dose Frequency    miconazole   BID    amLODIPine  7.5 mg Q DAY    insulin lispro  2-12 Units 4X/DAY ACHS    vitamin D3  1,000 Units DAILY    senna-docusate  2 Tablet BID    omeprazole  20 mg DAILY    apixaban  5 mg BID    aspirin  81 mg Q EVENING    finasteride  5 mg QDAY    insulin GLARGINE  10 Units QAM INSULIN    tamsulosin  0.4 mg DAILY     PRN medications: Respiratory Therapy Consult, hydrALAZINE, acetaminophen, senna-docusate **AND** polyethylene glycol/lytes, mag hydrox-al hydrox-simeth, ondansetron **OR** ondansetron, traZODone, sodium chloride, oxyCODONE immediate-release **OR** oxyCODONE immediate-release, [DISCONTINUED] insulin regular **AND** POC blood glucose manual result **AND** NOTIFY MD and PharmD **AND** Administer 20 grams of glucose (approximately 8 ounces of fruit juice) every 15 minutes PRN FSBG less than 70 mg/dL **AND** dextrose bolus, dicyclomine    Diet:  Current Diet Order   Procedures    Diet Order Diet: Cardiac; Second Modifier: (optional): Consistent CHO (Diabetic)       Medical Decision Making and Plan:  L CVA - 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 He has been started on ASA and Eliquis  -PT and OT  for mobility and ADLs. Per guidelines, 15 hours per week between PT, OT and/or SLP.  -Follow-up Neurology     HTN/A Fib/sCHF - Patient on Eliquis. Patient on Amlodipine 5 mg daily.  Consult hospitalist. History of low SBP at times. SBP into 150s, per hospitalist increase to 7.5 mg Amlodipine. SBP into 140s, continue amlodipine 7.5 mg daily  -Chest pain on 2/7 with unchanged EKG. Hospitalist following, continue Amlodipine 7.5 mg daily     HLD - Patient allergic to statins.      Hyponatremia - Check AM CMP - 141, will monitor     DM2 with hyperglycemia - Patient on Lantus 10 daily and SSI. Consult hospitalist, continue Lantus 10 U and SSI     BPH - Patient on Finasteride 5 mg and Flomax 0.4 mg. PVRs improving, continue Finasteride 5 mg and Flomax 0.4 mg daily    Dementia - Per family with dementia for past 2 years. Will monitor     Vitamin D deficiency - 27 on admission, started on 1000 U    Pain - Patient on PRN APAP and Oxycodone     Skin - Patient at risk for skin breakdown due to debility in areas including sacrum, achilles, elbows and head in addition to other sites. Nursing to assess skin daily.   -Right foot/left ankle diabetic pressure ulcer.  Wound care consulted. XR concerning for osteomyelitis. Ongoing discussion of treatment vs palliative     GI Ppx - Patient on Prilosec for GERD prophylaxis. Patient on Senna-docusate for constipation prophylaxis.      DVT Ppx - Patient Eliquis on transfer.    Dispo - Family with concerns about discharge. Palliative consulted  ____________________________________    T. Edouard Starks MD/PhD  [facility] - Physical Medicine & Rehabilitation   [facility] - Brain Injury Medicine   ____________________________________

## 2024-02-12 NOTE — PROGRESS NOTES
"                                                         NURSING DAILY NOTE    Name: Francesco Schulte   Date of Admission: 2/5/2024   Admitting Diagnosis: Left thalamic infarction (HCC)  Attending Physician: Jorgito Starks M.d.  Allergies: Atorvastatin, Doxycycline, Lisinopril, Simvastatin, Statins [hmg-coa-r inhibitors], Beta adrenergic blockers, Eplerenone, Metformin, and Spironolactone    Safety  Patient Assist  Mod Assist  Patient Precautions  Fall Risk  Precaution Comments  Per daughter report, \"underdiagnosed vascular dementia.\" Poor historian.  Bed Transfer Status  Refused  Toilet Transfer Status   Standby Assist  Assistive Devices  Rails, Wheelchair  Oxygen  None - Room Air  Diet/Therapeutic Dining  Current Diet Order   Procedures    Diet Order Diet: Cardiac; Second Modifier: (optional): Consistent CHO (Diabetic)     Pill Administration  whole  Agitated Behavioral Scale  20  ABS Level of Severity  No Agitation    Fall Risk  Has the patient had a fall this admission?   No  Ghazal Mcelroy Fall Risk Scoring  18, HIGH RISK  Fall Risk Safety Measures  bed alarm and bed strip alarm    Vitals  Temperature: 36.4 °C (97.5 °F)  Temp src: Oral  Pulse: 64  Respiration: 18  Blood Pressure : 132/72  Blood Pressure MAP (Calculated): 92 MM HG  BP Location: Right, Upper Arm  Patient BP Position: Supine     Oxygen  Pulse Oximetry: 96 %  O2 (LPM): 0  O2 Delivery Device: None - Room Air    Bowel and Bladder  Last Bowel Movement  02/09/24  Stool Type  Type 4: Like a sausage or snake, smooth and soft  Bowel Device  Bathroom, Other (Comment) (Bowel Meds)  Continent  Bladder: Did not void (no void since admit)   Bowel: No movement (no BM since admit)  Bladder Function  Urine Void (mL): 200 ml  Number of Times Voided: 1  Urinary Options: Yes  Urine Color: Yellow  Genitourinary Assessment   Bladder Assessment (WDL):  WDL Except  Andres Catheter: Not Applicable  Urine Color: Yellow  Bladder Device: Urinal  Bladder Scan: " Post Void  $ Bladder Scan Results (mL): 74  Bladder Medications: Yes    Skin  Neal Score   17  Sensory Interventions   Bed Types: Standard/Trauma Mattress with Overlay  Skin Preventative Measures: Heel Float Boots in Use , Pillows in Use for Support / Positioning, Waffle Overlay  Moisture Interventions         Pain  Pain Rating Scale  7 - Focus of attention, prevents doing daily activities  Pain Location  Rib Cage, Foot, Back, Generalized  Pain Location Orientation  Right, Left, Anterior, Mid  Pain Interventions   Rest    ADLs    Bathing      Linen Change      Personal Hygiene     Chlorhexidine Bath      Oral Care     Teeth/Dentures     Shave     Nutrition Percentage Eaten  Lunch, Between % Consumed  Environmental Precautions     Patient Turns/Positioning  Patient Turns Self from Side to Side  Patient Turns Assistance/Tolerance     Bed Positions  Bed Controls On, Bed Locked  Head of Bed Elevated  Self regulated      Psychosocial/Neurologic Assessment  Psychosocial Assessment  Psychosocial (WDL):  Within Defined Limits  Neurologic Assessment  Neuro (WDL): Exceptions to WDL  Level of Consciousness: Alert  Orientation Level: Oriented X4  Cognition: Follows commands  Speech: Clear  Pupil Assesment: No  Motor Function/Sensation Assessment: Motor strength, Sensation  RUE Sensation: Tingling, Numbness  Muscle Strength Right Arm: Good Strength Against Gravity and Moderate Resistance  LUE Sensation: Full sensation  Muscle Strength Left Arm: Good Strength Against Gravity and Moderate Resistance  RLE Sensation: Numbness, Tingling  Muscle Strength Right Leg: Good Strength Against Gravity and Moderate Resistance  LLE Sensation: Full sensation  Muscle Strength Left Leg: Good Strength Against Gravity and Moderate Resistance  EENT (WDL):  WDL Except    Cardio/Pulmonary Assessment  Edema      Respiratory Breath Sounds  RUL Breath Sounds: Clear  RML Breath Sounds: Clear, Diminished  RLL Breath Sounds: Clear, Diminished  EVELINA  Breath Sounds: Clear  LLL Breath Sounds: Clear, Diminished  Cardiac Assessment   Cardiac (WDL):  WDL Except (H/O HTN, Afib, MI 2017, AICD)

## 2024-02-12 NOTE — CARE PLAN
"  Problem: Knowledge Deficit - Standard  Goal: Patient and family/care givers will demonstrate understanding of plan of care, disease process/condition, diagnostic tests and medications  Outcome: Progressing  Note: Pt agrees with plan of care tonight regarding medications and safety.  Will continue to monitor patient.     Problem: Fall Risk - Rehab  Goal: Patient will remain free from falls  Outcome: Progressing  Note: Ghazal Mcelroy Fall risk Assessment Score: 18    High fall risk Interventions   - Alarming seatbelt  - Wander guard  - Bed and strip alarm   - Yellow sign by the door   - Yellow wrist band \"Fall risk\"  - Room near to the nurse station  - Do not leave patient unattended in the bathroom  - Fall risk education provided         The patient is Stable - Low risk of patient condition declining or worsening    Shift Goals  Clinical Goals: Safety  Patient Goals: Sleep well    Progress made toward(s) clinical / shift goals:  progressing        "

## 2024-02-12 NOTE — THERAPY
"Physical Therapy   Daily Treatment     Patient Name: Francesco Schulte  Age:  73 y.o., Sex:  male  Medical Record #: [id number]  Today's Date: 2/12/2024     Precautions  Precautions: Fall Risk  Comments: Per daughter report, \"underdiagnosed vascular dementia.\" Poor historian.    Subjective    \"I'd like to work in the parallel bars so I can improve my balance and take it to the next level.\" Pt in w/c at arrival, transported by SLP, agreeable to PT tx. Per SLP, pt presenting c/ rushing gait and LOB when ambulating c/ SPC at prior session.      Objective       02/12/24 1101   PT Charge Group   PT Neuromuscular Re-Education / Balance (Units) 1   PT Therapeutic Activities (Units) 2   PT Total Time Spent   PT Individual Total Time Spent (Mins) 45   Vitals   Pulse 73   Patient BP Position Sitting   Blood Pressure  130/75   Pulse Oximetry   (unable to read c/ finger probe 2/2 poor perfusion)   O2 Delivery Device None - Room Air   Gait Functional Level of Assist    Gait Level Of Assist Minimal Assist  (steadying needed when turning for LOB)   Assistive Device Single Point Cane   Distance (Feet) 30   # of Times Distance was Traveled 1   Deviation Shuffled Gait;Decreased Heel Strike;Decreased Toe Off;Step To  (dec foot clearance on RLE)   Transfer Functional Level of Assist   Bed, Chair, Wheelchair Transfer Contact Guard Assist   Bed Chair Wheelchair Transfer Description Increased time;Verbal cueing  (incidental stadying)   Toilet Transfers Contact Guard Assist   Toilet Transfer Description Supervision for safety;Grab bar;Increased time   Neuro-Muscular Treatments   Neuro-Muscular Treatments Postural Changes;Postural Facilitation;Tactile Cuing;Verbal Cuing;Weight Shift Right;Weight Shift Left;Sequencing;Tapping   Comments Dynamic pre-gait, stepping, standing in // bars, BUE weaning to 1 UE support: alt step ups to 4\" step 1 x 12, lateral step ups/overs on 4\" step 1 x 12, lateral step overs of 1\" objects 3 x 3 objects (8') " "each direction; forward/backward large amplitude stepping 5 x 4 steps each direction; seated rests btw efforts   Interdisciplinary Plan of Care Collaboration   IDT Collaboration with  Physical Therapist;Speech Therapist   Patient Position at End of Therapy In Bed;Bed Alarm On;Tray Table within Reach;Call Light within Reach   Collaboration Comments JJ PABON   Physical Therapist Assigned   Assigned PT / Treatment Time / Comments Aggie primary; no 7am therapy please     Focus on dynamic balance c/ weaning of UE support in // bars as above per pt request. Seated rest breaks needed for SOB and chest pain. Diaphragmatic breathing techniques employed to support MÉNDEZ.     Assessment    Bob motivated to work on improving balance, needs multimodal cues for sequencing large amplitude stepping, and to appropriately pace activities. Pt reporting \"my heart is done,\" at end of session yet wanting to ambulate back to room. Redirectable to resting in chair with education on value of pacing to promote function. Adamant about returning to bed at end of session, left in [place] position c/ tray set up for lunch.     Strengths: Willingly participates in therapeutic activities, Motivated for self care and independence  Barriers: Dementia, Impaired balance, Impaired activity tolerance, Fatigue (right foot drag with fatigue)    Plan    Standing balance, general strengthening/conditioning, gait training with SPC, stair/curb negotiation, OOB tolerance. Recently, overall participation in therapy has been inconsistent due to cognition and pain.     DME  PT DME Recommendations  Additional Equipment:  (LHS, reacher)    Passport items to be completed:  Get in/out of bed safely, in/out of a vehicle, safely use mobility device, walk or wheel around home/community, navigate up and down stairs, show how to get up/down from the ground, ensure home is accessible, demonstrate HEP, complete caregiver training    Physical Therapy Problems (Active)       " Problem: Mobility       Dates: Start:  02/06/24         Goal: STG-Within one week, patient will ambulate community distances 200ft x 2 with left SPC SPV       Dates: Start:  02/06/24            Goal: STG-Within one week, patient will ascend and descend four to six stairs no rails with step over patterning SBA       Dates: Start:  02/06/24               Problem: Mobility Transfers       Dates: Start:  02/06/24         Goal: STG-Within one week, patient will transfer bed to chair SPV SPT       Dates: Start:  02/06/24               Problem: PT-Long Term Goals       Dates: Start:  02/06/24         Goal: LTG-By discharge, patient will ambulate 400ft left SPC mod I level surfaces, SPV outdoors       Dates: Start:  02/06/24            Goal: LTG-By discharge, patient will transfer one surface to another mod I SPT safely and consistently       Dates: Start:  02/06/24            Goal: LTG-By discharge, patient will perform home exercise program seated/standing for LE strengthening to be done 3x/day in safe, independent home environment       Dates: Start:  02/06/24            Goal: LTG-By discharge, patient will up/down curb step to simulate home entrance with SPC with SPV       Dates: Start:  02/06/24

## 2024-02-12 NOTE — THERAPY
"Physical Therapy   Daily Treatment     Patient Name: Francesco Schulte  Age:  73 y.o., Sex:  male  Medical Record #: [id number]  Today's Date: 2/12/2024     Precautions  Precautions: (P) Fall Risk  Comments: (P) Per daughter report, \"underdiagnosed vascular dementia.\" Poor historian.    Subjective    Patient in bed and agreeable to therapy, requesting to work on balance.     Objective       02/12/24 1301   PT Charge Group   PT Neuromuscular Re-Education / Balance (Units) 1   PT Therapeutic Activities (Units) 1   PT Total Time Spent   PT Individual Total Time Spent (Mins) 30   Precautions   Precautions Fall Risk   Comments Per daughter report, \"underdiagnosed vascular dementia.\" Poor historian.   Gait Functional Level of Assist    Gait Level Of Assist Contact Guard Assist  (to Min A)   Assistive Device Single Point Cane   Distance (Feet) 300   # of Times Distance was Traveled 2   Deviation Shuffled Gait;Decreased Heel Strike;Decreased Toe Off  (dec foot clearance on RLE, general instability)   Transfer Functional Level of Assist   Bed, Chair, Wheelchair Transfer Contact Guard Assist   Bed Chair Wheelchair Transfer Description Increased time;Supervision for safety  (stand step transfer with no AD vs. SPC)   Toilet Transfers Contact Guard Assist   Toilet Transfer Description Grab bar;Adaptive equipment;Increased time;Supervision for safety  (SPC approach with grab bar)   Bed Mobility    Supine to Sit Supervised   Sit to Supine Supervised   Sit to Stand Standby Assist   Scooting Supervised   Rolling Supervised   Interdisciplinary Plan of Care Collaboration   IDT Collaboration with  Occupational Therapist   Patient Position at End of Therapy In Bed;Bed Alarm On;Call Light within Reach;Tray Table within Reach;Phone within Reach   Collaboration Comments CLOF     Standing dynamic balance activities with no UE support and SBA:   - Narrowed MIGUEL ÁNGEL passing 4# dumbbell around body in CW/CCW directions 1x10 each direction   - " Narrowed MIGUEL ÁNGEL performing [place]hof press with green theraband 1x10 each side    Toileting at ambulatory level with SPC and grab bar, extra time for hygiene, standing LB dressing, and standing hand hygiene.    Assessment    Patient with improved activity tolerance from this morning and motivated to work on balance. Continues to present as high fall risk especially with inconsistent performance and variable insight into deficits.    Strengths: Willingly participates in therapeutic activities, Motivated for self care and independence  Barriers: Dementia, Impaired balance, Impaired activity tolerance, Fatigue (right foot drag with fatigue)    Plan    Standing balance, general strengthening/conditioning, gait training with SPC, stair/curb negotiation, OOB tolerance. Recently, overall participation in therapy has been inconsistent due to cognition and pain.      DME  PT DME Recommendations  Additional Equipment:  (LHS, reacher)     Passport items to be completed:  Get in/out of bed safely, in/out of a vehicle, safely use mobility device, walk or wheel around home/community, navigate up and down stairs, show how to get up/down from the ground, ensure home is accessible, demonstrate HEP, complete caregiver training      Physical Therapy Problems (Active)       Problem: Mobility       Dates: Start:  02/06/24         Goal: STG-Within one week, patient will ambulate community distances 200ft x 2 with left SPC SPV       Dates: Start:  02/06/24            Goal: STG-Within one week, patient will ascend and descend four to six stairs no rails with step over patterning SBA       Dates: Start:  02/06/24               Problem: Mobility Transfers       Dates: Start:  02/06/24         Goal: STG-Within one week, patient will transfer bed to chair SPV SPT       Dates: Start:  02/06/24               Problem: PT-Long Term Goals       Dates: Start:  02/06/24         Goal: LTG-By discharge, patient will ambulate 400ft left SPC mod I level  surfaces, SPV outdoors       Dates: Start:  02/06/24            Goal: LTG-By discharge, patient will transfer one surface to another mod I SPT safely and consistently       Dates: Start:  02/06/24            Goal: LTG-By discharge, patient will perform home exercise program seated/standing for LE strengthening to be done 3x/day in safe, independent home environment       Dates: Start:  02/06/24            Goal: LTG-By discharge, patient will up/down curb step to simulate home entrance with SPC with SPV       Dates: Start:  02/06/24

## 2024-02-12 NOTE — CARE PLAN
Problem: Infection  Goal: Patient will remain free from infection  Note: Per , Hospitalist is consulting Infection Disease doctor to determine the type of antibiotic to be used for Osteomyelitis. [facility] from [facility] visited patient on Saturday.     Problem: Pain - Standard  Goal: Alleviation of pain or a reduction in pain to the patient’s comfort goal  Note: Patient c/o chest / ribcage pain. PRN Oxycodone 10 mg is being given as requested per patient.         The patient is Watcher - Medium risk of patient condition declining or worsening    Shift Goals  Clinical Goals: Safety  Patient Goals: Sleep well    Progress made toward(s) clinical / shift goals:        Patient is not progressing towards the following goals:

## 2024-02-12 NOTE — DOCUMENTATION QUERY
[facility]                                                                       Query Response Note      PATIENT:               MARZENA ROMEO  ACCT #:                  [id number]  MRN:                     [id number]  :                      1950  ADMIT DATE:       2024 3:54 PM  DISCH DATE:        2024 2:17 PM  RESPONDING  PROVIDER #:        [id number]           QUERY TEXT:      The patient carries a diagnosis of lupus. With the implementation of ICD  10 CM, there is no longer a default code for lupus to systemic lupus erythematosus. [facility] advises the  to query the physician for lupus specificity.   Based on clinical findings, risk factors and treatment, can lupus be further clarified as:    NOTE:  If an appropriate response is not listed below, please respond with a new note.      The patient's Clinical Indicators include:  past medical history:  Lupus    risks- stroke, HTN,CKD,CHF,DM    [email or web address]  Options provided:   -- Lupus anticoagulant (LAC)   -- Lupus discoid (erythematosus) (local)   -- Lupus exedens   -- Lupus hydralazine   -- Lupus nontuberculous, not disseminated   -- Lupus pernio ([place])   -- Lupus systemic (SLE)   -- Lupus Tuberculous   -- Lupus vulgaris   -- Unable to determine      Query created by: Tricia Ledezma on 2024 3:53 PM    RESPONSE TEXT:    Unable to determine          Electronically signed by:  MAURICIO EWING MD 2024 6:09 AM

## 2024-02-12 NOTE — PROGRESS NOTES
"                                                         NURSING DAILY NOTE    Name: Francesco Schulte   Date of Admission: 2/5/2024   Admitting Diagnosis: Left thalamic infarction (HCC)  Attending Physician: Jorgito Starks M.d.  Allergies: Atorvastatin, Doxycycline, Lisinopril, Simvastatin, Statins [hmg-coa-r inhibitors], Beta adrenergic blockers, Eplerenone, Metformin, and Spironolactone    Safety  Patient Assist  mod  Patient Precautions  Fall Risk  Precaution Comments  Per daughter report, \"underdiagnosed vascular dementia.\" Poor historian.  Bed Transfer Status  Refused  Toilet Transfer Status   Standby Assist  Assistive Devices  Rails, Wheelchair  Oxygen  None - Room Air  Diet/Therapeutic Dining  Current Diet Order   Procedures    Diet Order Diet: Cardiac; Second Modifier: (optional): Consistent CHO (Diabetic)     Pill Administration  whole  Agitated Behavioral Scale  20  ABS Level of Severity  No Agitation    Fall Risk  Has the patient had a fall this admission?   No  Ghazal Mcelroy Fall Risk Scoring  18, HIGH RISK  Fall Risk Safety Measures  bed alarm, chair alarm, and poor balance    Vitals  Temperature: 36.6 °C (97.8 °F)  Temp src: Temporal  Pulse: 64  Respiration: 14  Blood Pressure : 132/76  Blood Pressure MAP (Calculated): 95 MM HG  BP Location: Right, Upper Arm  Patient BP Position: Supine     Oxygen  Pulse Oximetry: 95 %  O2 (LPM): 0  O2 Delivery Device: None - Room Air    Bowel and Bladder  Last Bowel Movement  02/09/24  Stool Type  Type 4: Like a sausage or snake, smooth and soft  Bowel Device  Bathroom  Continent  Bladder: Did not void (no void since admit)   Bowel: No movement (no BM since admit)  Bladder Function  Urine Void (mL): 500 ml  Number of Times Voided: 2  Urine Color: Unable To Evaluate  Genitourinary Assessment   Bladder Assessment (WDL):  WDL Except  Andres Catheter: Not Applicable  Urine Color: Unable To Evaluate  Bladder Device: Bathroom  Bladder Scan: Post Void  $ " Bladder Scan Results (mL): 139  Bladder Medications: Yes    Skin  Neal Score   17  Sensory Interventions   Bed Types: Standard/Trauma Mattress with Overlay  Skin Preventative Measures: Waffle Overlay  Moisture Interventions         Pain  Pain Rating Scale  7 - Focus of attention, prevents doing daily activities  Pain Location  Rib Cage, Foot, Back, Generalized  Pain Location Orientation  Right, Left, Anterior, Mid  Pain Interventions   Medication (see MAR), Rest, Repositioned    ADLs    Bathing      Linen Change      Personal Hygiene     Chlorhexidine Bath      Oral Care     Teeth/Dentures     Shave     Nutrition Percentage Eaten  Lunch, Between % Consumed  Environmental Precautions     Patient Turns/Positioning  Patient Turns Self from Side to Side  Patient Turns Assistance/Tolerance     Bed Positions     Head of Bed Elevated         Psychosocial/Neurologic Assessment  Psychosocial Assessment  Psychosocial (WDL):  Within Defined Limits  Neurologic Assessment  Neuro (WDL): Exceptions to WDL  Level of Consciousness: Alert  Orientation Level: Oriented X4  Cognition: Follows commands  Speech: Clear  Pupil Assesment: No  Motor Function/Sensation Assessment: Motor strength, Sensation  RUE Sensation: Tingling, Numbness  Muscle Strength Right Arm: Good Strength Against Gravity and Moderate Resistance  LUE Sensation: Full sensation  Muscle Strength Left Arm: Good Strength Against Gravity and Moderate Resistance  RLE Sensation: Numbness, Tingling  Muscle Strength Right Leg: Good Strength Against Gravity and Moderate Resistance  LLE Sensation: Full sensation  Muscle Strength Left Leg: Good Strength Against Gravity and Moderate Resistance  EENT (WDL):  WDL Except    Cardio/Pulmonary Assessment  Edema      Respiratory Breath Sounds  RUL Breath Sounds: Clear  RML Breath Sounds: Diminished  RLL Breath Sounds: Diminished  EVELINA Breath Sounds: Clear  LLL Breath Sounds: Diminished  Cardiac Assessment   Cardiac (WDL):  WDL  Except

## 2024-02-12 NOTE — PROGRESS NOTES
[facility] Medicine Daily Progress Note    Date of Service  2/12/2024    Chief Complaint:  Hypertension  Diabetes    Interval History:  No significant events overnight.    Review of Systems  Review of Systems   Constitutional:  Negative for chills and fever.   Respiratory:  Negative for shortness of breath.    Cardiovascular:  Negative for chest pain.   Gastrointestinal:  Negative for abdominal pain, diarrhea, nausea and vomiting.   Psychiatric/Behavioral:  The patient is not nervous/anxious.         Physical Exam  Temp:  [36.4 °C (97.5 °F)-36.8 °C (98.3 °F)] 36.8 °C (98.3 °F)  Pulse:  [64-77] 77  Resp:  [14-18] 18  BP: (115-134)/(71-76) 115/74  SpO2:  [95 %-97 %] 97 %    Physical Exam  Vitals and nursing note reviewed.   Constitutional:       Appearance: Normal appearance.   HENT:      Head: Atraumatic.   Eyes:      Conjunctiva/sclera: Conjunctivae normal.      Pupils: Pupils are equal, round, and reactive to light.   Cardiovascular:      Rate and Rhythm: Normal rate and regular rhythm.      Heart sounds: No murmur heard.  Pulmonary:      Effort: Pulmonary effort is normal.      Breath sounds: No stridor. No wheezing or rales.   Abdominal:      General: There is no distension.      Palpations: Abdomen is soft.      Tenderness: There is no abdominal tenderness.   Musculoskeletal:      Cervical back: Normal range of motion and neck supple.      Right lower leg: No edema.      Left lower leg: No edema.   Skin:     General: Skin is warm and dry.      Findings: No rash.   Neurological:      Mental Status: He is alert and oriented to person, place, and time.      Comments: Has dementia and fabricates.   Psychiatric:         Mood and Affect: Mood normal.         Behavior: Behavior normal.         Fluids    Intake/Output Summary (Last 24 hours) at 2/12/2024 0932  Last data filed at 2/12/2024 0616  Gross per 24 hour   Intake 240 ml   Output 400 ml   Net -160 ml         Laboratory                             Imaging    Assessment/Plan  * Left thalamic infarction (HCC)- (present on admission)  Assessment & Plan  Cont ASA  Cont Eliquis    Osteomyelitis of second toe of right foot ([facility])  Assessment & Plan  Per xrays  Physiatry will consult ID for further management    Note: D/W Aretha (pt daughter [phone]) and would like to have ID eval for management possibilities.            Afterwards, a decision will be made as to proceed with treatment or go home with hospice.    Dementia ([facility])  Assessment & Plan  Has had dementia for about 2 years  Pt fabricates things at times    CHF (congestive heart failure) ([facility])  Assessment & Plan  Has hx  Has hx of AICD  BNP: 3537 (4/2023) --> 3703 (2/2024)  Cont Eliquis  Echo (1/25): EF 25%, RVSP 53    Paroxysmal A-fib ([facility])  Assessment & Plan  HR ok  Last few EKG's showed normal sinus rhythm  Not on any rate controlling meds  Cont Eliquis  Cont to monitor    Vitamin D insufficiency  Assessment & Plan  Vit D: 27  Cont supplements    Chest pain- (present on admission)  Assessment & Plan  Had CP on 2/7  Trop: 79 (within baseline recently)  BNP: 3537 (4/2023) --> 3703 (2/8/2024)  EKG: showed SR; no ST elevations or depression  Has hx of on & off CP    Had recent [place] [facility]:  Trops were elevated up to 83 and returned to 82  He reported ongoing chest pain  Case was discussed with on-call Cardiologist Dr. Nadeem Silver confirmed patient has an LBBB but no STEMI, there was no warrant for any angiography  Patient's troponinemia is from demand ischemia     Essential hypertension- (present on admission)  Assessment & Plan  BP ok   Cont Norvasc: 5 mg daily --> 7.5 mg daily (2/8)  Cont to monitor    Type 2 diabetes mellitus with hyperglycemia ([facility])- (present on admission)  Assessment & Plan  Hba1c: 5.9 (2/6)  BS a little labile but ok  Cont Glargine: 10 units qam  Note: home meds includes Tresiba 10 units qd  Cont to monitor

## 2024-02-12 NOTE — THERAPY
"Speech Language Pathology  Daily Treatment     Patient Name: Francesco Schulte  Age:  73 y.o., Sex:  male  Medical Record #: [id number]  Today's Date: 2/12/2024     Precautions  Precautions: Fall Risk  Comments: Per daughter report, \"underdiagnosed vascular dementia.\" Poor historian.    Subjective    Pt in bed upon arrival, pt agreeable to complete session in SLP office. SLP walked with pt to office with CGA, pt required MAX prompting to slow down as he was walking at an increased pace and appeared unsteady. Once seated in office, pt stated that he needed a moment for the circulation in his fingers to return, due to noticeable changes in ambulation and appearance SLP performed vitals check, requested wheelchair from staff, and spoke with RN and respiratory therapist due to low O2 saturation readings. Respiratory therapist cleared pt and session was initiated.      Objective       02/12/24 1033   Treatment Charges   SLP Cognitive Skill Development First 15 Minutes 1   SLP Cognitive Skill Development Additional 15 Minutes 1   SLP Total Time Spent   SLP Individual Total Time Spent (Mins) 30         Assessment    Pt initiated medication sort at this time. Pt required encouragement and MOD cueing and repetition of directions to begin task. Due to time constraints pt was unable to complete medication sort at this time. Pt accurately sorted 2 of the 3 medications completed at this time. Pt stated \"I really don't like doing this\". SLP educated pt on importance of medication management and discussed discontinuing task once 100% accuracy is achieved. Task to be continued during next session.     Strengths: Able to follow instructions, Alert and oriented, Effective communication skills, Independent prior level of function, Willingly participates in therapeutic activities, Supportive family  Barriers: Impaired functional cognition    Plan    Complete medication sort.       Speech Therapy Problems (Active)       Problem: " Memory STGs       Dates: Start:  02/06/24         Goal: STG-Within one week, patient will implement functional memory strategies with 80% accuracy provided MIN cues.       Dates: Start:  02/06/24               Problem: Problem Solving STGs       Dates: Start:  02/06/24         Goal: STG-Within one week, patient will complete functional medication and financial management tasks with 80% accuracy with MIN cues.       Dates: Start:  02/06/24               Problem: Speech/Swallowing LTGs       Dates: Start:  02/06/24         Goal: LTG-By discharge, patient will complete functional problem solving and recall information with 80% accuracy provided MOD I.       Dates: Start:  02/06/24

## 2024-02-12 NOTE — PROGRESS NOTES
Received bedside shift report from Alejandro ANDRES RN regarding patient and assumed care. Patient awake, calm and stable, currently positioned in bed for comfort and safety; call light within reach. Denies pain or discomfort at this time. Will continue to monitor.

## 2024-02-12 NOTE — THERAPY
"Physical Therapy   Daily Treatment     Patient Name: Francesco Schulte  Age:  73 y.o., Sex:  male  Medical Record #: [id number]  Today's Date: 2/12/2024     Precautions  Precautions: (P) Fall Risk  Comments: (P) Per daughter report, \"underdiagnosed vascular dementia.\" Poor historian.    Subjective    Patient in bed, continuing to report severe pain in chest/ribs, agreeable to modified participation in therapy with encouragement.     Objective       02/12/24 0901   PT Charge Group   PT Therapeutic Exercise (Units) 1   PT Therapeutic Activities (Units) 1   PT Total Time Spent   PT Individual Total Time Spent (Mins) 30   Precautions   Precautions Fall Risk   Comments Per daughter report, \"underdiagnosed vascular dementia.\" Poor historian.   Pain 0 - 10 Group   Location Chest;Rib Cage   Location Orientation Left;Anterior   Transfer Functional Level of Assist   Bed, Chair, Wheelchair Transfer Standby Assist   Bed Chair Wheelchair Transfer Description Increased time;Initial preparation for task;Supervision for safety  (stand pivot/step transfer with UE support)   Bed Mobility    Supine to Sit Supervised   Sit to Supine Supervised   Sit to Stand Standby Assist   Scooting Supervised   Interdisciplinary Plan of Care Collaboration   IDT Collaboration with  Nursing;Occupational Therapist;Physical Therapist   Patient Position at End of Therapy In Bed;Bed Alarm On;Call Light within Reach;Tray Table within Reach;Phone within Reach   Collaboration Comments [place] [place] Motomed for 10 minutes (7:47 passive, 2:13 active), 0.57 miles total.    Assessment    Patient tolerated session fairly, decreased activity tolerance compared to last week, as patient declined ambulation this session. Did agree that Motomed helped with circulation and overall malaise.    Strengths: Willingly participates in therapeutic activities, Motivated for self care and independence  Barriers: Dementia, Impaired balance, Impaired activity tolerance, Fatigue " (right foot drag with fatigue)    Plan    Standing balance, general strengthening/conditioning, gait training with SPC, stair/curb negotiation, OOB tolerance. Recently, overall participation in therapy has been inconsistent due to cognition and pain.     DME  PT DME Recommendations  Additional Equipment:  (LHS, reacher)     Passport items to be completed:  Get in/out of bed safely, in/out of a vehicle, safely use mobility device, walk or wheel around home/community, navigate up and down stairs, show how to get up/down from the ground, ensure home is accessible, demonstrate HEP, complete caregiver training    Physical Therapy Problems (Active)       Problem: Mobility       Dates: Start:  02/06/24         Goal: STG-Within one week, patient will ambulate community distances 200ft x 2 with left SPC SPV       Dates: Start:  02/06/24            Goal: STG-Within one week, patient will ascend and descend four to six stairs no rails with step over patterning SBA       Dates: Start:  02/06/24               Problem: Mobility Transfers       Dates: Start:  02/06/24         Goal: STG-Within one week, patient will transfer bed to chair SPV SPT       Dates: Start:  02/06/24               Problem: PT-Long Term Goals       Dates: Start:  02/06/24         Goal: LTG-By discharge, patient will ambulate 400ft left SPC mod I level surfaces, SPV outdoors       Dates: Start:  02/06/24            Goal: LTG-By discharge, patient will transfer one surface to another mod I SPT safely and consistently       Dates: Start:  02/06/24            Goal: LTG-By discharge, patient will perform home exercise program seated/standing for LE strengthening to be done 3x/day in safe, independent home environment       Dates: Start:  02/06/24            Goal: LTG-By discharge, patient will up/down curb step to simulate home entrance with SPC with SPV       Dates: Start:  02/06/24

## 2024-02-12 NOTE — PROGRESS NOTES
"Patient c/o chest pain.\"My heart hurts\".  VSS. PRN Oxycodone 10 mg was given. Dr. Concepcion was notified.   "

## 2024-02-13 NOTE — PROGRESS NOTES
"  Physical Medicine & Rehabilitation Progress Note    Encounter Date: 2/13/2024    Chief Complaint: Decreased mobility, weakness    Interval Events (Subjective):  Patient sitting up in room. He reports he had pain earlier but resolved. Discussed would consult ID as family would like treatment of osteomyelitis and not hospice at this time per hospitalist. Hospitalist reports long discussion with daughter and discussion of both ID and hospice. Patient reports he will let his family decide.      _____________________________________  Interdisciplinary Team Conference   Most recent IDT on 2/8/2024    Discharge Date/Disposition:  2/19/24  _____________________________________      Objective:  VITAL SIGNS: /69   Pulse 64   Temp 36.8 °C (98.2 °F) (Oral)   Resp 18   Ht 1.854 m (6' 1\")   Wt 80.7 kg (178 lb)   SpO2 92%   BMI 23.48 kg/m²   Gen: NAD  Psych: Mood and affect appropriate  CV: RRR, 0 edema  Resp: CTAB, no upper airway sounds  Abd: NTND  Neuro: AOx3, does not remember previous conversation, following commands  Unchanged from 2/12/24    Laboratory Values:  Recent Results (from the past 72 hour(s))   POCT glucose device results    Collection Time: 02/10/24  5:19 PM   Result Value Ref Range    POC Glucose, Blood 115 (H) 65 - 99 mg/dL   POCT glucose device results    Collection Time: 02/10/24  8:58 PM   Result Value Ref Range    POC Glucose, Blood 194 (H) 65 - 99 mg/dL   POCT glucose device results    Collection Time: 02/11/24  8:02 AM   Result Value Ref Range    POC Glucose, Blood 142 (H) 65 - 99 mg/dL   POCT glucose device results    Collection Time: 02/11/24 11:27 AM   Result Value Ref Range    POC Glucose, Blood 142 (H) 65 - 99 mg/dL   POCT glucose device results    Collection Time: 02/11/24  5:34 PM   Result Value Ref Range    POC Glucose, Blood 116 (H) 65 - 99 mg/dL   POCT glucose device results    Collection Time: 02/11/24 10:46 PM   Result Value Ref Range    POC Glucose, Blood 123 (H) 65 - 99 mg/dL "   POCT glucose device results    Collection Time: 02/12/24  7:36 AM   Result Value Ref Range    POC Glucose, Blood 85 65 - 99 mg/dL   POCT glucose device results    Collection Time: 02/12/24 11:12 AM   Result Value Ref Range    POC Glucose, Blood 162 (H) 65 - 99 mg/dL   POCT glucose device results    Collection Time: 02/12/24  5:41 PM   Result Value Ref Range    POC Glucose, Blood 108 (H) 65 - 99 mg/dL   POCT glucose device results    Collection Time: 02/12/24  8:58 PM   Result Value Ref Range    POC Glucose, Blood 109 (H) 65 - 99 mg/dL   CBC WITHOUT DIFFERENTIAL    Collection Time: 02/13/24  5:41 AM   Result Value Ref Range    WBC 7.3 4.8 - 10.8 K/uL    RBC 5.95 4.70 - 6.10 M/uL    Hemoglobin 15.4 14.0 - 18.0 g/dL    Hematocrit 47.5 42.0 - 52.0 %    MCV 79.8 (L) 81.4 - 97.8 fL    MCH 25.9 (L) 27.0 - 33.0 pg    MCHC 32.4 32.3 - 36.5 g/dL    RDW 66.7 (H) 35.9 - 50.0 fL    Platelet Count 469 (H) 164 - 446 K/uL    MPV 11.0 9.0 - 12.9 fL   POCT glucose device results    Collection Time: 02/13/24  7:11 AM   Result Value Ref Range    POC Glucose, Blood 137 (H) 65 - 99 mg/dL   POCT glucose device results    Collection Time: 02/13/24 11:14 AM   Result Value Ref Range    POC Glucose, Blood 171 (H) 65 - 99 mg/dL       Medications:  Scheduled Medications   Medication Dose Frequency    miconazole   BID    amLODIPine  7.5 mg Q DAY    insulin lispro  2-12 Units 4X/DAY ACHS    vitamin D3  1,000 Units DAILY    senna-docusate  2 Tablet BID    omeprazole  20 mg DAILY    apixaban  5 mg BID    aspirin  81 mg Q EVENING    finasteride  5 mg QDAY    insulin GLARGINE  10 Units QAM INSULIN    tamsulosin  0.4 mg DAILY     PRN medications: oxyCODONE immediate-release **OR** oxyCODONE immediate-release, Respiratory Therapy Consult, hydrALAZINE, acetaminophen, senna-docusate **AND** polyethylene glycol/lytes, mag hydrox-al hydrox-simeth, ondansetron **OR** ondansetron, traZODone, sodium chloride, [DISCONTINUED] insulin regular **AND** POC blood  glucose manual result **AND** NOTIFY MD and PharmD **AND** Administer 20 grams of glucose (approximately 8 ounces of fruit juice) every 15 minutes PRN FSBG less than 70 mg/dL **AND** dextrose bolus, dicyclomine    Diet:  Current Diet Order   Procedures    Diet Order Diet: Cardiac; Second Modifier: (optional): Consistent CHO (Diabetic)       Medical Decision Making and Plan:  L CVA - 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 He has been started on ASA and Eliquis  -PT and OT for mobility and ADLs. Per guidelines, 15 hours per week between PT, OT and/or SLP.  -Follow-up Neurology     HTN/A Fib/sCHF - Patient on Eliquis. Patient on Amlodipine 5 mg daily.  Consult hospitalist. History of low SBP at times. SBP into 150s, per hospitalist increase to 7.5 mg Amlodipine. SBP into 140s, continue amlodipine 7.5 mg daily  -Chest pain on 2/7 with unchanged EKG. Hospitalist following, continue Amlodipine 7.5 mg daily. Chest pain daily, may be chest wall pain as work-up negative. Continue Amlodipine 7.5 mg daily     HLD - Patient allergic to statins.      Hyponatremia - Check AM CMP - 141, will monitor     DM2 with hyperglycemia - Patient on Lantus 10 daily and SSI. Consult hospitalist, continue Lantus 10 U and SSI     BPH - Patient on Finasteride 5 mg and Flomax 0.4 mg. PVRs improving, continue Finasteride 5 mg and Flomax 0.4 mg daily    Dementia - Per family with dementia for past 2 years. Will monitor     Vitamin D deficiency - 27 on admission, started on 1000 U    Pain - Patient on PRN APAP and Oxycodone     Skin - Patient at risk for skin breakdown due to debility in areas including sacrum, achilles, elbows and head in addition to other sites. Nursing to assess skin daily.   -Right foot/left ankle diabetic pressure ulcer.  Wound care consulted. XR concerning for osteomyelitis. Family would like to consider treatment, needs discussion with orthopedics and ID.      GI Ppx - Patient on Prilosec for GERD prophylaxis.  Patient on Senna-docusate for constipation prophylaxis.      DVT Ppx - Patient Eliquis on transfer.    Dispo - Family with concerns about discharge. Family would like to pursue nonsurgical treatment but cannot take him home at this time. SNF referrals and outpatient referral to ID and Orthopedics for further discussion   ____________________________________    T. Edouard Starks MD/PhD  [facility] - Physical Medicine & Rehabilitation   [facility] - Brain Injury Medicine   ____________________________________     Total time:  50 minutes. Time spent included pre-rounding review of vitals and tests, unit/floor time, face-to-face time with the patient including physical examination, care coordination, counseling of patient and/or family, ordering medications/procedures/tests, discussion with CM, PT, OT, SLP and/or other healthcare providers, and documentation in the electronic medical record. Topics discussed included discharge planning, treatment vs palliative, family would like treatment, and referrals to ID/Ortho. Referrals sent to SNFs.

## 2024-02-13 NOTE — THERAPY
"Occupational Therapy  Daily Treatment     Patient Name: Francesco Schulte  Age:  73 y.o., Sex:  male  Medical Record #: [id number]  Today's Date: 2/13/2024     Precautions  Precautions: Fall Risk  Comments: Per daughter report, \"underdiagnosed vascular dementia.\" Poor historian.         Subjective    \"Do you have a plan for what we're going to do?\"     Objective       02/13/24 1400   Sitting Upper Body Exercises   Comments   (shoulder reaches with 4lb ball. reaching with both arms up, then diagonally side to side)   Balance   Sitting Balance (Dynamic)   (sitting balance exercise w/o use of arm rests on WC. Pt able to reach for cones to stack horse shoes)         Assessment    Pt pleasant and participatory in therapy. Able to tolerate exercises well.   Strengths: Adequate strength, Alert and oriented, Good insight into deficits/needs, Independent prior level of function, Motivated for self care and independence, Pleasant and cooperative, Supportive family, Willingly participates in therapeutic activities  Barriers: Impaired activity tolerance, Impaired balance    Plan    Standing balance, ADLs, continue w/ primary OT POC    DME  OT DME Recommendations  Bathroom Equipment:  (shower chair)  Additional Equipment:  (LHS, reacher)    Passport items to be completed:  Perform bathroom transfers, complete dressing, complete feeding, get ready for the day, prepare a simple meal, participate in household tasks, adapt home for safety needs, demonstrate home exercise program, complete caregiver training     Occupational Therapy Goals (Active)       Problem: Dressing       Dates: Start:  02/06/24         Goal: STG-Within one week, patient will dress UB and LB at [facility] using LRD       Dates: Start:  02/06/24               Problem: Functional Transfers       Dates: Start:  02/06/24         Goal: STG-Within one week, patient will transfer to toilet at [facility] to [facility] using LRD       Dates: Start:  02/06/24            Goal: STG-Within " one week, patient will transfer to tub/shower at [facility] to [facility] using LRD       Dates: Start:  02/06/24               Problem: IADL's       Dates: Start:  02/06/24         Goal: STG-Within one week, patient will access kitchen area at [facility] to [facility] using LRD       Dates: Start:  02/06/24               Problem: OT Long Term Goals       Dates: Start:  02/06/24         Goal: LTG-By discharge, patient will complete basic self care tasks at [facility] to independent        Dates: Start:  02/06/24            Goal: LTG-By discharge, patient will perform bathroom transfers at [facility] to independent        Dates: Start:  02/06/24            Goal: LTG-By discharge, patient will complete basic home management at [facility] to [facility]        Dates: Start:  02/06/24               Problem: Toileting       Dates: Start:  02/06/24         Goal: STG-Within one week, patient will complete toileting tasks at [facility] to [facility] using LRD       Dates: Start:  02/06/24

## 2024-02-13 NOTE — PROGRESS NOTES
Patient c/o increased chest/ribcage pain 8/10. PRN Oxycodone 10 mg given. Hospitalist and Physiatrist were notified.  Patient looks fatigued and he is mildly confused. He attempted to get out of bed without calling for assistance. .

## 2024-02-13 NOTE — PROGRESS NOTES
Hospital Medicine Daily Progress Note        Chief Complaint:  Hypertension  Diabetes    Interval History:  Pt endorses chest pain when asked.  Labs reviewed.    Review of Systems  Review of Systems   Constitutional:  Negative for chills and fever.   HENT: Negative.     Eyes: Negative.    Respiratory:  Negative for cough and shortness of breath.    Cardiovascular:  Positive for chest pain. Negative for palpitations.   Gastrointestinal:  Negative for abdominal pain, nausea and vomiting.   Musculoskeletal: Negative.    Skin:  Negative for itching and rash.   Endo/Heme/Allergies:  Negative for polydipsia. Does not bruise/bleed easily.        Physical Exam  Temp:  [36.8 °C (98.2 °F)-36.8 °C (98.3 °F)] 36.8 °C (98.3 °F)  Pulse:  [59-64] 62  Resp:  [18] 18  BP: (116-138)/(69-73) 138/73  SpO2:  [92 %-96 %] 92 %    Physical Exam  Vitals reviewed.   Constitutional:       General: He is not in acute distress.     Appearance: Normal appearance. He is not ill-appearing.   HENT:      Head: Normocephalic and atraumatic.      Right Ear: External ear normal.      Left Ear: External ear normal.      Nose: Nose normal.      Mouth/Throat:      Pharynx: Oropharynx is clear.   Eyes:      General:         Right eye: No discharge.         Left eye: No discharge.      Extraocular Movements: Extraocular movements intact.      Conjunctiva/sclera: Conjunctivae normal.   Cardiovascular:      Rate and Rhythm: Normal rate and regular rhythm.   Pulmonary:      Effort: No respiratory distress.      Breath sounds: No wheezing.      Comments: Decreased BS  Abdominal:      General: Bowel sounds are normal. There is no distension.      Palpations: Abdomen is soft.      Tenderness: There is no abdominal tenderness.   Musculoskeletal:      Cervical back: Normal range of motion and neck supple.      Right lower leg: No edema.      Left lower leg: No edema.   Skin:     General: Skin is warm and dry.   Neurological:      Mental Status: He is alert.       Comments: Awake and alert         Fluids    Intake/Output Summary (Last 24 hours) at 2/13/2024 1647  Last data filed at 2/13/2024 1300  Gross per 24 hour   Intake 600 ml   Output 1100 ml   Net -500 ml       Laboratory  Recent Labs     02/13/24  0541   WBC 7.3   RBC 5.95   HEMOGLOBIN 15.4   HEMATOCRIT 47.5   MCV 79.8*   MCH 25.9*   MCHC 32.4   RDW 66.7*   PLATELETCT 469*   MPV 11.0                   Assessment/Plan  * Left thalamic infarction (HCC)- (present on admission)  Assessment & Plan  On ASA and Eliquis  Ongoing management per Physiatry    Osteomyelitis of second toe of right foot (HCC)  Assessment & Plan  Pending ID consult per Dr. Concepcion    Dementia ([facility])  Assessment & Plan  History noted    Paroxysmal A-fib (HCC)  Assessment & Plan  Echo EF 20%, mod AI, RVSP 53 mmHg  H/O AICD  On no rate controlling agents  Monitor for tachydysrhythmias  Anticoagulated on Eliquis    Vitamin D insufficiency  Assessment & Plan  Vit D level 27  On supplementation    Chest pain- (present on admission)  Assessment & Plan  Has had ongoing recurrent symptoms w/ repeatedly negative work up    Essential hypertension- (present on admission)  Assessment & Plan  Observe blood pressure trends on Norvasc  Monitor for orthostatic hypotension on Flomax and Proscar    Type 2 diabetes mellitus with hyperglycemia (HCC)- (present on admission)  Assessment & Plan  HbA1c 5.9  Increase Lantus  Continue SSI  Outpt meds include Tresiba 10 u qd    Thrombocytosis- (present on admission)  Assessment & Plan  Likely reactive  Follow PLT  Check F/U labs in AM        Full Code

## 2024-02-13 NOTE — PROGRESS NOTES
PALLIATIVE CARE SOCIAL WORK CONSULT NOTE    Patient: Bob Schulte  Age: 73  Gender: Male  MRN: [id number]  Insurance: Medicare  Date Admitted: 2/5/24  Date of Service: 2/13/24    LMSW met with patient. He was sleeping and not able to participate in conversation. There were no family at bedside. LMSW left message for patient's wife. More detailed note to follow.     Ernestina Gutierrez LMSW  Palliative Care

## 2024-02-13 NOTE — PROGRESS NOTES
"                                                         NURSING DAILY NOTE    Name: Francesco Schulte   Date of Admission: 2/5/2024   Admitting Diagnosis: Left thalamic infarction (HCC)  Attending Physician: Jorgito Starks M.d.  Allergies: Atorvastatin, Doxycycline, Lisinopril, Simvastatin, Statins [hmg-coa-r inhibitors], Beta adrenergic blockers, Eplerenone, Metformin, and Spironolactone    Safety  Patient Assist  Mod Assist  Patient Precautions  Fall Risk  Precaution Comments  Per daughter report, \"underdiagnosed vascular dementia.\" Poor historian.  Bed Transfer Status  Contact Guard Assist  Toilet Transfer Status   Contact Guard Assist  Assistive Devices  Cane - single point  Oxygen  None - Room Air  Diet/Therapeutic Dining  Current Diet Order   Procedures    Diet Order Diet: Cardiac; Second Modifier: (optional): Consistent CHO (Diabetic)     Pill Administration  whole  Agitated Behavioral Scale  20  ABS Level of Severity  No Agitation    Fall Risk  Has the patient had a fall this admission?   No  Ghazal Mcelroy Fall Risk Scoring  18, HIGH RISK  Fall Risk Safety Measures  bed alarm and bed strip alarm    Vitals  Temperature: 36.8 °C (98.3 °F)  Temp src: Oral  Pulse: (!) 59  Respiration: 18  Blood Pressure : 123/72  Blood Pressure MAP (Calculated): 89 MM HG  BP Location: Right, Upper Arm  Patient BP Position: Supine     Oxygen  Pulse Oximetry: 96 %  O2 (LPM): 0  O2 Delivery Device: None - Room Air    Bowel and Bladder  Last Bowel Movement  02/09/24  Stool Type  Type 4: Like a sausage or snake, smooth and soft  Bowel Device  Bathroom, Other (Comment) (Bowel Meds)  Continent  Bladder: Did not void (no void since admit)   Bowel: No movement (no BM since admit)  Bladder Function  Urine Void (mL): 300 ml  Number of Times Voided: 1  Urinary Options: Yes  Urine Color: Adrienne  Genitourinary Assessment   Bladder Assessment (WDL):  WDL Except  Andres Catheter: Not Applicable  Urine Color: Adrienne  Bladder Device: " Bathroom  Bladder Scan: Post Void  $ Bladder Scan Results (mL): 76  Bladder Medications: Yes    Skin  Neal Score   17  Sensory Interventions   Bed Types: Standard/Trauma Mattress with Overlay  Skin Preventative Measures: Pillows in Use for Support / Positioning, Waffle Overlay, Heel Float Boots in Use   Moisture Interventions         Pain  Pain Rating Scale  7 - Focus of attention, prevents doing daily activities  Pain Location  Chest, Rib Cage  Pain Location Orientation  Left, Anterior  Pain Interventions   Medication (see MAR)    ADLs    Bathing      Linen Change      Personal Hygiene     Chlorhexidine Bath      Oral Care     Teeth/Dentures     Shave     Nutrition Percentage Eaten  Lunch, Between % Consumed  Environmental Precautions  Treaded Slipper Socks on Patient, Bed in Low Position  Patient Turns/Positioning  Patient Turns Self from Side to Side  Patient Turns Assistance/Tolerance  Assistance of One, General Weakness  Bed Positions  Bed Controls On, Bed Locked  Head of Bed Elevated  Self regulated      Psychosocial/Neurologic Assessment  Psychosocial Assessment  Psychosocial (WDL):  Within Defined Limits  Neurologic Assessment  Neuro (WDL): Exceptions to WDL  Level of Consciousness: Alert  Orientation Level: Oriented X4  Cognition: Follows commands  Speech: Clear  Pupil Assesment: No  Motor Function/Sensation Assessment: Motor strength, Sensation  RUE Sensation: Tingling, Numbness  Muscle Strength Right Arm: Good Strength Against Gravity and Moderate Resistance  LUE Sensation: Full sensation  Muscle Strength Left Arm: Good Strength Against Gravity and Moderate Resistance  RLE Sensation: Numbness, Tingling  Muscle Strength Right Leg: Good Strength Against Gravity and Moderate Resistance  LLE Sensation: Full sensation  Muscle Strength Left Leg: Good Strength Against Gravity and Moderate Resistance  EENT (WDL):  WDL Except    Cardio/Pulmonary Assessment  Edema      Respiratory Breath Sounds  RUL Breath  Sounds: Clear  RML Breath Sounds: Clear, Diminished  RLL Breath Sounds: Clear, Diminished  EVELINA Breath Sounds: Clear  LLL Breath Sounds: Clear, Diminished  Cardiac Assessment   Cardiac (WDL):  WDL Except (H/O HTN, Afib, MI 2017, AICD)

## 2024-02-13 NOTE — THERAPY
"Speech Language Pathology  Daily Treatment     Patient Name: Francesco Schlute  Age:  73 y.o., Sex:  male  Medical Record #: [id number]  Today's Date: 2/13/2024     Precautions  Precautions: Fall Risk  Comments: Per daughter report, \"underdiagnosed vascular dementia.\" Poor historian.    Subjective    Pt in bed upon arrival, pt agreeable to attend session in SLP office. SLP assisted in bed to chair transfer.      Objective       02/13/24 1003   Treatment Charges   SLP Cognitive Skill Development First 15 Minutes 1   SLP Cognitive Skill Development Additional 15 Minutes 1   SLP Total Time Spent   SLP Individual Total Time Spent (Mins) 30         Assessment    Pt reported that he had recently received pain medication and that he needed a minute before attending to presented task. Pt presented with written problem solving task prompting pt to read image of pill bottle and answer questions pertaining to label. Pt unable to complete task due to fatigue and pt reporting that he is \"zoning out\". Pt achieved a 100% on completed items with assistance, requiring MIN prompting to attend to details and redirect to task.    Strengths: Able to follow instructions, Alert and oriented, Effective communication skills, Independent prior level of function, Willingly participates in therapeutic activities, Supportive family  Barriers: Impaired functional cognition    Plan    Continue pill bottle problem solving task.     Speech Therapy Problems (Active)       Problem: Memory STGs       Dates: Start:  02/06/24         Goal: STG-Within one week, patient will implement functional memory strategies with 80% accuracy provided MIN cues.       Dates: Start:  02/06/24               Problem: Problem Solving STGs       Dates: Start:  02/06/24         Goal: STG-Within one week, patient will complete functional medication and financial management tasks with 80% accuracy with MIN cues.       Dates: Start:  02/06/24               Problem: " Speech/Swallowing LTGs       Dates: Start:  02/06/24         Goal: LTG-By discharge, patient will complete functional problem solving and recall information with 80% accuracy provided MOD I.       Dates: Start:  02/06/24

## 2024-02-13 NOTE — THERAPY
"Occupational Therapy  Daily Treatment     Patient Name: Francesco Schulte  Age:  73 y.o., Sex:  male  Medical Record #: [id number]  Today's Date: 2/13/2024     Precautions  Precautions: Fall Risk  Comments: Per daughter report, \"underdiagnosed vascular dementia.\" Poor historian.         Subjective    \"I can't do anything in this state, I have lot of pain.\" Pt presented with slowed speech, difficulty to staying awake, and holding his left chest.       Objective       02/13/24 0901   Therapy Missed   Missed Therapy (Minutes) 45   Reason For Missed Therapy Medical - Patient on Hold from Therapy  (Pt unable to participate in therapy d/t c/o chest pain (8/10) and nausea. RN notified)   OT Charge Group   OT Self Care / ADL (Units) 1     Session focused on pt education of importance of maintaining participation in self-care tasks to prevent further decline in function as pt has pattern of therapy refusals. Pt verbalized understanding but c/o of pain/nausea interfering with his ability to participate. OTR attempted to engage pt in gentle bedside ADLs (e.g., donning a shirt); however, pt refused all OTR attempts to engage pt.      Assessment    Pt unable to participate in therapy/bedside ADLs this day d/t pain, fatigue, and nausea.       Strengths: Adequate strength, Alert and oriented, Good insight into deficits/needs, Independent prior level of function, Motivated for self care and independence, Pleasant and cooperative, Supportive family, Willingly participates in therapeutic activities  Barriers: Impaired activity tolerance, Impaired balance    Plan    Cont ADLs, functional transfers, neuro re-ed/balance, and thera act/ex to increase strength and endurance to maximize functional recovery for safe DC home with family support.      DME  OT DME Recommendations  Bathroom Equipment:  (shower chair)  Additional Equipment:  (LHS, reacher)    Passport items to be completed:  Perform bathroom transfers, complete dressing, " complete feeding, get ready for the day, prepare a simple meal, participate in household tasks, adapt home for safety needs, demonstrate home exercise program, complete caregiver training     Occupational Therapy Goals (Active)       Problem: Dressing       Dates: Start:  02/06/24         Goal: STG-Within one week, patient will dress UB and LB at [facility] using LRD       Dates: Start:  02/06/24               Problem: Functional Transfers       Dates: Start:  02/06/24         Goal: STG-Within one week, patient will transfer to toilet at [facility] to [facility] using LRD       Dates: Start:  02/06/24            Goal: STG-Within one week, patient will transfer to tub/shower at [facility] to [facility] using LRD       Dates: Start:  02/06/24               Problem: IADL's       Dates: Start:  02/06/24         Goal: STG-Within one week, patient will access kitchen area at [facility] to [facility] using LRD       Dates: Start:  02/06/24               Problem: OT Long Term Goals       Dates: Start:  02/06/24         Goal: LTG-By discharge, patient will complete basic self care tasks at [facility] to independent        Dates: Start:  02/06/24            Goal: LTG-By discharge, patient will perform bathroom transfers at [facility] to independent        Dates: Start:  02/06/24            Goal: LTG-By discharge, patient will complete basic home management at [facility] to [facility]        Dates: Start:  02/06/24               Problem: Toileting       Dates: Start:  02/06/24         Goal: STG-Within one week, patient will complete toileting tasks at [facility] to [facility] using LRD       Dates: Start:  02/06/24

## 2024-02-13 NOTE — PROGRESS NOTES
"                                                         NURSING DAILY NOTE    Name: Francesco Schulte   Date of Admission: 2/5/2024   Admitting Diagnosis: Left thalamic infarction (HCC)  Attending Physician: Jorgito Starks M.d.  Allergies: Atorvastatin, Doxycycline, Lisinopril, Simvastatin, Statins [hmg-coa-r inhibitors], Beta adrenergic blockers, Eplerenone, Metformin, and Spironolactone    Safety  Patient Assist  Mod Assist  Patient Precautions  Fall Risk  Precaution Comments  Per daughter report, \"underdiagnosed vascular dementia.\" Poor historian.  Bed Transfer Status  Contact Guard Assist  Toilet Transfer Status   Contact Guard Assist  Assistive Devices  Rails, Wheelchair  Oxygen  None - Room Air  Diet/Therapeutic Dining  Current Diet Order   Procedures    Diet Order Diet: Cardiac; Second Modifier: (optional): Consistent CHO (Diabetic)     Pill Administration  whole  Agitated Behavioral Scale  20  ABS Level of Severity  No Agitation    Fall Risk  Has the patient had a fall this admission?   No  Ghazal Mcelroy Fall Risk Scoring  18, HIGH RISK  Fall Risk Safety Measures  bed alarm and chair alarm    Vitals  Temperature: 36.8 °C (98.3 °F)  Temp src: Oral  Pulse: 63  Respiration: 18  Blood Pressure : 129/74  Blood Pressure MAP (Calculated): 92 MM HG  BP Location: Right, Upper Arm  Patient BP Position: Supine     Oxygen  Pulse Oximetry: 98 %  O2 (LPM): 0  O2 Delivery Device: None - Room Air    Bowel and Bladder  Last Bowel Movement  02/09/24  Stool Type  Type 4: Like a sausage or snake, smooth and soft  Bowel Device  Bathroom, Other (Comment) (Bowel Meds)  Continent  Bladder: Did not void (no void since admit)   Bowel: No movement (no BM since admit)  Bladder Function  Urine Void (mL): 300 ml  Number of Times Voided: 1  Urinary Options: Yes  Urine Color: Unable To Evaluate  Genitourinary Assessment   Bladder Assessment (WDL):  WDL Except  Andres Catheter: Not Applicable  Urine Color: Unable To " Evaluate  Bladder Device: Bathroom  Bladder Scan: Post Void  $ Bladder Scan Results (mL): 74  Bladder Medications: Yes    Skin  Neal Score   17  Sensory Interventions   Bed Types: Standard/Trauma Mattress  Skin Preventative Measures: Heel Float Boots in Use , Pillows in Use for Support / Positioning, Waffle Overlay  Moisture Interventions         Pain  Pain Rating Scale  8 - Awful, hard to do anything  Pain Location  Chest, Rib Cage  Pain Location Orientation  Left, Anterior  Pain Interventions   Medication (see MAR)    ADLs    Bathing      Linen Change      Personal Hygiene     Chlorhexidine Bath      Oral Care     Teeth/Dentures     Shave     Nutrition Percentage Eaten  Lunch, Between % Consumed  Environmental Precautions     Patient Turns/Positioning  Patient Declines and Understands Importance  Patient Turns Assistance/Tolerance     Bed Positions  Bed Controls On  Head of Bed Elevated  Self regulated      Psychosocial/Neurologic Assessment  Psychosocial Assessment  Psychosocial (WDL):  Within Defined Limits  Neurologic Assessment  Neuro (WDL): Exceptions to WDL  Level of Consciousness: Alert  Orientation Level: Oriented X4  Cognition: Follows commands  Speech: Clear  Pupil Assesment: No  Motor Function/Sensation Assessment: Motor strength, Sensation  RUE Sensation: Tingling, Numbness  Muscle Strength Right Arm: Good Strength Against Gravity and Moderate Resistance  LUE Sensation: Full sensation  Muscle Strength Left Arm: Good Strength Against Gravity and Moderate Resistance  RLE Sensation: Numbness, Tingling  Muscle Strength Right Leg: Good Strength Against Gravity and Moderate Resistance  LLE Sensation: Full sensation  Muscle Strength Left Leg: Good Strength Against Gravity and Moderate Resistance  EENT (WDL):  WDL Except    Cardio/Pulmonary Assessment  Edema      Respiratory Breath Sounds  RUL Breath Sounds: Clear  RML Breath Sounds: Clear, Diminished  RLL Breath Sounds: Clear, Diminished  EVELINA Breath  Sounds: Clear  LLL Breath Sounds: Clear, Diminished  Cardiac Assessment   Cardiac (WDL):  WDL Except (H/O HTN, Afib, MI 2017, AICD)

## 2024-02-13 NOTE — PROGRESS NOTES
Received bedside shift report from Afua DECKER RN regarding patient and assumed care. Patient awake, calm and stable, currently positioned in bed for comfort and safety; call light within reach. Denies pain or discomfort at this time. Will continue to monitor.

## 2024-02-14 NOTE — THERAPY
"Occupational Therapy  Daily Treatment     Patient Name: Francesco Schulte  Age:  73 y.o., Sex:  male  Medical Record #: [id number]  Today's Date: 2/14/2024     Precautions  Precautions: (P) Fall Risk  Comments: (P) Per daughter report, \"underdiagnosed vascular dementia.\" Poor historian         Subjective    Pt encountered 2x this day for skilled therapy. Throughout sessions, pt reported \"poor communication\" amongst staff regarding medication and therapy. Pt initially, not agreeable to participate during 0831 session, but was agreeable to work on toileting and grooming.       Objective       02/14/24 0831 02/14/24 1101   OT Charge Group   OT Self Care / ADL (Units) 4  --    OT Therapeutic Exercise (Units)  --  2   OT Total Time Spent   OT Individual Total Time Spent (Mins) 60 30   Precautions   Precautions Fall Risk Fall Risk   Comments Per daughter report, \"underdiagnosed vascular dementia.\" Poor historian Per daughter report, \"underdiagnosed vascular dementia.\" Poor historian   Functional Level of Assist   Lower Body Dressing Supervision  --    Lower Body Dressing Description Increased time;Supervision for safety  (EOB)  --    Toileting Supervision  --    Toileting Description Grab bar;Increased time;Supervision for safety  --    Bed, Chair, Wheelchair Transfer Standby Assist  --    Bed Chair Wheelchair Transfer Description Increased time;Set-up of equipment;Supervision for safety  (Stand step from bed <> WC)  --    Toilet Transfers Standby Assist  --    Toilet Transfer Description Grab bar;Supervision for safety  (stand step from WC <> toilet)  --    Bed Mobility    Supine to Sit Supervised  --    Sit to Supine Supervised  --    Scooting Supervised  --    Skilled Intervention Facilitation  --    Interdisciplinary Plan of Care Collaboration   IDT Collaboration with   --  Physical Therapist   Patient Position at End of Therapy In Bed;Bed Alarm On;Call Light within Reach;Tray Table within Reach;Phone within Reach " In Bed;Bed Alarm On;Call Light within Reach;Tray Table within Reach;Phone within Reach     0831: pt required max VC to encourage participation in ADLs. Pt eventually agreeable as he requested to use the restroom.     1101 session: functional mobility from room <> west gym at [facility] using SPC; no RB; shuffled gait noted  Thera act: Functional mobility using the agility ladder and cones obstacle course; 2 sets of 2 reps with one seated RB d/t fatigue. Pt performed at [facility] using SPC with LOB noted navigating between cones which decreased following VC to take small steps when walking between cones.    Assessment    Pt with subtle increase in participation when self motivated (e.g. toileting and walking). Pt cont to demonstrate poor insight into needs and purpose of participation in ADLs, requiring max VC to encourage participation in daily routines.         Strengths: Adequate strength, Alert and oriented, Good insight into deficits/needs, Independent prior level of function, Motivated for self care and independence, Pleasant and cooperative, Supportive family, Willingly participates in therapeutic activities  Barriers: Impaired activity tolerance, Impaired balance    Plan    Cont ADLs, functional transfers, neuro re-ed/balance, and thera act/ex to increase strength and endurance to maximize functional recovery for safe DC home with family support.       DME  OT DME Recommendations  Bathroom Equipment:  (shower chair)  Additional Equipment:  (LHS, reacher)    Passport items to be completed:  Perform bathroom transfers, complete dressing, complete feeding, get ready for the day, prepare a simple meal, participate in household tasks, adapt home for safety needs, demonstrate home exercise program, complete caregiver training     Occupational Therapy Goals (Active)       Problem: Dressing       Dates: Start:  02/06/24         Goal: STG-Within one week, patient will dress UB and LB at [facility] using LRD       Dates: Start:  02/06/24                Problem: Functional Transfers       Dates: Start:  02/06/24         Goal: STG-Within one week, patient will transfer to toilet at [facility] to [facility] using LRD       Dates: Start:  02/06/24            Goal: STG-Within one week, patient will transfer to tub/shower at [facility] to [facility] using LRD       Dates: Start:  02/06/24               Problem: IADL's       Dates: Start:  02/06/24         Goal: STG-Within one week, patient will access kitchen area at [facility] to [facility] using LRD       Dates: Start:  02/06/24               Problem: OT Long Term Goals       Dates: Start:  02/06/24         Goal: LTG-By discharge, patient will complete basic self care tasks at [facility] to independent        Dates: Start:  02/06/24            Goal: LTG-By discharge, patient will perform bathroom transfers at [facility] to independent        Dates: Start:  02/06/24            Goal: LTG-By discharge, patient will complete basic home management at [facility] to [facility]        Dates: Start:  02/06/24               Problem: Toileting       Dates: Start:  02/06/24         Goal: STG-Within one week, patient will complete toileting tasks at [facility] to [facility] using LRD       Dates: Start:  02/06/24

## 2024-02-14 NOTE — PROGRESS NOTES
"                                                         NURSING DAILY NOTE    Name: Francesco Schulte   Date of Admission: 2/5/2024   Admitting Diagnosis: Left thalamic infarction (HCC)  Attending Physician: Jorgito Starks M.d.  Allergies: Atorvastatin, Doxycycline, Lisinopril, Simvastatin, Statins [hmg-coa-r inhibitors], Beta adrenergic blockers, Eplerenone, Metformin, and Spironolactone    Safety  Patient Assist  min/mod  Patient Precautions  Fall Risk  Precaution Comments  Per daughter report, \"underdiagnosed vascular dementia.\" Poor historian.  Bed Transfer Status  Contact Guard Assist  Toilet Transfer Status   Contact Guard Assist  Assistive Devices  Rails, Wheelchair  Oxygen  None - Room Air  Diet/Therapeutic Dining  Current Diet Order   Procedures    Diet Order Diet: Cardiac; Second Modifier: (optional): Consistent CHO (Diabetic)     Pill Administration  whole and one at a time   Agitated Behavioral Scale  20  ABS Level of Severity  No Agitation    Fall Risk  Has the patient had a fall this admission?   No  Ghazal Mcelroy Fall Risk Scoring  18, HIGH RISK  Fall Risk Safety Measures  bed alarm, chair alarm, seatbelt alarm, poor balance, and low vision/ hearing    Vitals  Temperature: 36.6 °C (97.8 °F)  Temp src: Oral  Pulse: 68  Respiration: 18  Blood Pressure : 123/72  Blood Pressure MAP (Calculated): 89 MM HG  BP Location: Right, Upper Arm  Patient BP Position: Supine     Oxygen  Pulse Oximetry: 94 %  O2 (LPM): 0  O2 Delivery Device: None - Room Air    Bowel and Bladder  Last Bowel Movement  02/11/24  Stool Type  Type 4: Like a sausage or snake, smooth and soft  Bowel Device  Bathroom, Other (Comment) (Bowel Meds)  Continent  Bladder: Did not void (no void since admit)   Bowel: No movement (no BM since admit)  Bladder Function  Urine Void (mL): 200 ml  Number of Times Voided: 1  Urinary Options: Yes  Urine Color: Yellow  Genitourinary Assessment   Bladder Assessment (WDL):  WDL Except  Andres " Catheter: Not Applicable  Urine Color: Yellow  Bladder Device: Bathroom  Bladder Scan: Post Void  $ Bladder Scan Results (mL): 218  Bladder Medications: Yes    Skin  Neal Score   17  Sensory Interventions   Bed Types: Standard/Trauma Mattress  Skin Preventative Measures: Pillows in Use for Support / Positioning  Moisture Interventions         Pain  Pain Rating Scale  7 - Focus of attention, prevents doing daily activities  Pain Location  Chest, Rib Cage  Pain Location Orientation  Anterior  Pain Interventions   Medication (see MAR)    ADLs    Bathing      Linen Change      Personal Hygiene     Chlorhexidine Bath      Oral Care     Teeth/Dentures     Shave     Nutrition Percentage Eaten  Lunch, Between 25-50% Consumed  Environmental Precautions  Treaded Slipper Socks on Patient, Personal Belongings, Wastebasket, Call Bell etc. in Easy Reach, Transferred to Stronger Side, Report Given to Other Health Care Providers Regarding Fall Risk, Bed in Low Position, Communication Sign for Patients & Families, Mobility Assessed & Appropriate Sign Placed  Patient Turns/Positioning  Patient Turns Self from Side to Side  Patient Turns Assistance/Tolerance  Assistance of One  Bed Positions  Bed Controls On, Bed Locked  Head of Bed Elevated  Self regulated      Psychosocial/Neurologic Assessment  Psychosocial Assessment  Psychosocial (WDL):  WDL Except  Patient Behaviors: Confused  Neurologic Assessment  Neuro (WDL): Exceptions to WDL  Level of Consciousness: Alert  Orientation Level: Oriented to place, Oriented to situation, Oriented to person, Disoriented to time  Cognition: Follows commands  Speech: Clear  Pupil Assesment: No  Motor Function/Sensation Assessment: Motor strength, Sensation  RUE Sensation: Tingling, Numbness  Muscle Strength Right Arm: Good Strength Against Gravity and Moderate Resistance  LUE Sensation: Full sensation  Muscle Strength Left Arm: Good Strength Against Gravity and Moderate Resistance  RLE Sensation:  Numbness, Tingling  Muscle Strength Right Leg: Good Strength Against Gravity and Moderate Resistance  LLE Sensation: Full sensation  Muscle Strength Left Leg: Good Strength Against Gravity and Moderate Resistance  EENT (WDL):  WDL Except    Cardio/Pulmonary Assessment  Edema      Respiratory Breath Sounds  RUL Breath Sounds: Clear  RML Breath Sounds: Clear, Diminished  RLL Breath Sounds: Clear, Diminished  EVELINA Breath Sounds: Clear  LLL Breath Sounds: Clear, Diminished  Cardiac Assessment   Cardiac (WDL):  WDL Except (hx afib, cardiomyopathy, aicd, cad, stemi, htn, hld, chf)

## 2024-02-14 NOTE — CARE PLAN
The patient is Stable - Low risk of patient condition declining or worsening    Shift Goals  Clinical Goals: Safety  Patient Goals: Sleep well    Progress made toward(s) clinical / shift goals:    Problem: Knowledge Deficit - Standard  Goal: Patient and family/care givers will demonstrate understanding of plan of care, disease process/condition, diagnostic tests and medications  Outcome: Progressing   Patient educated on the POC and medications administered. All questions and concern addressed at this time   Problem: Pain - Standard  Goal: Alleviation of pain or a reduction in pain to the patient’s comfort goal  Outcome: Progressing   Use of 0-10 pain scale. Patient is able to verbalize pain and discomfort appropriately. PRN pain medications administered  Problem: Fall Risk - Rehab  Goal: Patient will remain free from falls  Outcome: Progressing  Reinforcement of using the call light for assistance was provided. Patient was disoriented throughout the night and attempted to get out of bed without assistance. Strip alarm in place. Call light within reach. Hourly rounding     Patient is not progressing towards the following goals:

## 2024-02-14 NOTE — CARE PLAN
Problem: Knowledge Deficit - Standard  Goal: Patient and family/care givers will demonstrate understanding of plan of care, disease process/condition, diagnostic tests and medications  Note: Patient is forgetful and mildly confused.Alarms in place.        Problem: Pain - Standard  Goal: Alleviation of pain or a reduction in pain to the patient’s comfort goal  Note: Oxycodone was increased from 10 to 15 mg for severe pain.      The patient is Stable - Low risk of patient condition declining or worsening    Shift Goals  Clinical Goals: Safety  Patient Goals: Sleep well

## 2024-02-14 NOTE — PROGRESS NOTES
Spoke to Daughter Aretha today regarding patient status and increased confusion has occurred with medical staff since this morning. Patient unable to take pills with water. Floated in applesauce this morning. Patient does not recall taking medications this morning.     UA has been ordered.

## 2024-02-14 NOTE — DISCHARGE PLANNING
TC with [facility] [phone] who states facility will not have a bed until next week, requesting update on Palliative discussion, and will continue to process referral for acceptance.   Informed [facility], per Palliative note in chart, LMSW was unable to consult with pt as not able to participate, pending call back from [facility]. Per [facility] pt is unable to transfer to [facility] under hospice or end of life care; Can only provide short term SNF, to update [facility] regarding palliative decision.    Writer informed CM assigned.

## 2024-02-14 NOTE — PROGRESS NOTES
"                                                         NURSING DAILY NOTE    Name: Francesco Schulte   Date of Admission: 2/5/2024   Admitting Diagnosis: Left thalamic infarction (HCC)  Attending Physician: Jorgito Starks M.d.  Allergies: Atorvastatin, Doxycycline, Lisinopril, Simvastatin, Statins [hmg-coa-r inhibitors], Beta adrenergic blockers, Eplerenone, Metformin, and Spironolactone    Safety  Patient Assist  mod assist  Patient Precautions  Fall Risk  Precaution Comments  Per daughter report, \"underdiagnosed vascular dementia.\" Poor historian.  Bed Transfer Status  Contact Guard Assist  Toilet Transfer Status   Contact Guard Assist  Assistive Devices  Rails, Wheelchair  Oxygen  None - Room Air  Diet/Therapeutic Dining  Current Diet Order   Procedures    Diet Order Diet: Cardiac; Second Modifier: (optional): Consistent CHO (Diabetic)     Pill Administration  whole  Agitated Behavioral Scale  20  ABS Level of Severity  No Agitation    Fall Risk  Has the patient had a fall this admission?   No  Ghazal Mcelroy Fall Risk Scoring  18, HIGH RISK  Fall Risk Safety Measures  bed alarm and chair alarm    Vitals  Temperature: 36.8 °C (98.3 °F)  Temp src: Oral  Pulse: 62  Respiration: 18  Blood Pressure : 138/73  Blood Pressure MAP (Calculated): 95 MM HG  BP Location: Right, Upper Arm  Patient BP Position: Supine     Oxygen  Pulse Oximetry: 92 %  O2 (LPM): 0  O2 Delivery Device: None - Room Air    Bowel and Bladder  Last Bowel Movement  02/09/24  Stool Type  Type 4: Like a sausage or snake, smooth and soft  Bowel Device  Bathroom, Other (Comment) (Bowel Meds)  Continent  Bladder: Did not void (no void since admit)   Bowel: No movement (no BM since admit)  Bladder Function  Urine Void (mL): 300 ml  Number of Times Voided: 1  Urinary Options: Yes  Urine Color: Unable To Evaluate  Genitourinary Assessment   Bladder Assessment (WDL):  WDL Except  Andres Catheter: Not Applicable  Urine Color: Unable To " Evaluate  Bladder Device: Bathroom  Bladder Scan: Post Void  $ Bladder Scan Results (mL): 190  Bladder Medications: Yes    Skin  Neal Score   17  Sensory Interventions   Bed Types: Standard/Trauma Mattress  Skin Preventative Measures: Pillows in Use for Support / Positioning, Waffle Overlay, Heel Float Boots in Use   Moisture Interventions         Pain  Pain Rating Scale  8 - Awful, hard to do anything  Pain Location  Chest, Rib Cage  Pain Location Orientation  Left, Anterior  Pain Interventions   Medication (see MAR)    ADLs    Bathing      Linen Change      Personal Hygiene     Chlorhexidine Bath      Oral Care     Teeth/Dentures     Shave     Nutrition Percentage Eaten  Lunch, Between 25-50% Consumed  Environmental Precautions  Treaded Slipper Socks on Patient, Bed in Low Position  Patient Turns/Positioning  Patient Turns Self from Side to Side  Patient Turns Assistance/Tolerance  Assistance of One  Bed Positions  Bed Controls On  Head of Bed Elevated  Self regulated      Psychosocial/Neurologic Assessment  Psychosocial Assessment  Psychosocial (WDL):  Within Defined Limits  Neurologic Assessment  Neuro (WDL): Exceptions to WDL  Level of Consciousness: Alert  Orientation Level: Oriented X4  Cognition: Follows commands  Speech: Clear  Pupil Assesment: No  Motor Function/Sensation Assessment: Motor strength, Sensation  RUE Sensation: Tingling, Numbness  Muscle Strength Right Arm: Good Strength Against Gravity and Moderate Resistance  LUE Sensation: Full sensation  Muscle Strength Left Arm: Good Strength Against Gravity and Moderate Resistance  RLE Sensation: Numbness, Tingling  Muscle Strength Right Leg: Good Strength Against Gravity and Moderate Resistance  LLE Sensation: Full sensation  Muscle Strength Left Leg: Good Strength Against Gravity and Moderate Resistance  EENT (WDL):  WDL Except    Cardio/Pulmonary Assessment  Edema      Respiratory Breath Sounds  RUL Breath Sounds: Clear  RML Breath Sounds: Clear,  Diminished  RLL Breath Sounds: Clear, Diminished  EVELINA Breath Sounds: Clear  LLL Breath Sounds: Clear, Diminished  Cardiac Assessment   Cardiac (WDL):  WDL Except (H/O HTN, Afib, MI 2017, AICD)

## 2024-02-14 NOTE — THERAPY
Missed Therapy    Patient Name: Francesco Schulte  Age:  73 y.o., Sex:  male  Medical Record #: [id number]  Today's Date: 2/14/2024    Discussed missed therapy with Dr. Starks, therapy scheduling.       02/14/24 1231   Therapy Missed   Missed Therapy (Minutes) 30   Reason For Missed Therapy Medical - Patient on Hold from Therapy  (on hold due to complaints of chest pain; confusion)   Interdisciplinary Plan of Care Collaboration   IDT Collaboration with  Physician   Collaboration Comments Dr Starks gave medical hold due to patient's confusion;  c/o chest pain

## 2024-02-14 NOTE — PROGRESS NOTES
"  Physical Medicine & Rehabilitation Progress Note    Encounter Date: 2/14/2024    Chief Complaint: Decreased mobility, weakness    Interval Events (Subjective):  Patient sitting up in room. He reports fatigue. Discussed working on SNF placement. Denies NVD.     _____________________________________  Interdisciplinary Team Conference   Most recent IDT on 2/8/2024    Discharge Date/Disposition:  2/19/24  _____________________________________      Objective:  VITAL SIGNS: /72   Pulse 68   Temp 36.6 °C (97.8 °F) (Oral)   Resp 18   Ht 1.854 m (6' 1\")   Wt 80.7 kg (178 lb)   SpO2 94%   BMI 23.48 kg/m²   Gen: NAD  Psych: Mood and affect appropriate  CV: RRR, 0 edema  Resp: CTAB, no upper airway sounds  Abd: NTND  Neuro: AOx2, following commands    Laboratory Values:  Recent Results (from the past 72 hour(s))   POCT glucose device results    Collection Time: 02/11/24  5:34 PM   Result Value Ref Range    POC Glucose, Blood 116 (H) 65 - 99 mg/dL   POCT glucose device results    Collection Time: 02/11/24 10:46 PM   Result Value Ref Range    POC Glucose, Blood 123 (H) 65 - 99 mg/dL   POCT glucose device results    Collection Time: 02/12/24  7:36 AM   Result Value Ref Range    POC Glucose, Blood 85 65 - 99 mg/dL   POCT glucose device results    Collection Time: 02/12/24 11:12 AM   Result Value Ref Range    POC Glucose, Blood 162 (H) 65 - 99 mg/dL   POCT glucose device results    Collection Time: 02/12/24  5:41 PM   Result Value Ref Range    POC Glucose, Blood 108 (H) 65 - 99 mg/dL   POCT glucose device results    Collection Time: 02/12/24  8:58 PM   Result Value Ref Range    POC Glucose, Blood 109 (H) 65 - 99 mg/dL   CBC WITHOUT DIFFERENTIAL    Collection Time: 02/13/24  5:41 AM   Result Value Ref Range    WBC 7.3 4.8 - 10.8 K/uL    RBC 5.95 4.70 - 6.10 M/uL    Hemoglobin 15.4 14.0 - 18.0 g/dL    Hematocrit 47.5 42.0 - 52.0 %    MCV 79.8 (L) 81.4 - 97.8 fL    MCH 25.9 (L) 27.0 - 33.0 pg    MCHC 32.4 32.3 - 36.5 " g/dL    RDW 66.7 (H) 35.9 - 50.0 fL    Platelet Count 469 (H) 164 - 446 K/uL    MPV 11.0 9.0 - 12.9 fL   POCT glucose device results    Collection Time: 02/13/24  7:11 AM   Result Value Ref Range    POC Glucose, Blood 137 (H) 65 - 99 mg/dL   POCT glucose device results    Collection Time: 02/13/24 11:14 AM   Result Value Ref Range    POC Glucose, Blood 171 (H) 65 - 99 mg/dL   POCT glucose device results    Collection Time: 02/13/24  5:19 PM   Result Value Ref Range    POC Glucose, Blood 125 (H) 65 - 99 mg/dL   POCT glucose device results    Collection Time: 02/13/24  9:27 PM   Result Value Ref Range    POC Glucose, Blood 133 (H) 65 - 99 mg/dL   proBrain Natriuretic Peptide, NT    Collection Time: 02/14/24  5:25 AM   Result Value Ref Range    NT-proBNP 2554 (H) 0 - 125 pg/mL   Basic Metabolic Panel    Collection Time: 02/14/24  5:25 AM   Result Value Ref Range    Sodium 135 135 - 145 mmol/L    Potassium 3.9 3.6 - 5.5 mmol/L    Chloride 103 96 - 112 mmol/L    Co2 21 20 - 33 mmol/L    Glucose 149 (H) 65 - 99 mg/dL    Bun 23 (H) 8 - 22 mg/dL    Creatinine 1.07 0.50 - 1.40 mg/dL    Calcium 9.1 8.5 - 10.5 mg/dL    Anion Gap 11.0 7.0 - 16.0   ESTIMATED GFR    Collection Time: 02/14/24  5:25 AM   Result Value Ref Range    GFR (CKD-EPI) 74 >60 mL/min/1.73 m 2   POCT glucose device results    Collection Time: 02/14/24  7:20 AM   Result Value Ref Range    POC Glucose, Blood 152 (H) 65 - 99 mg/dL   POCT glucose device results    Collection Time: 02/14/24 10:58 AM   Result Value Ref Range    POC Glucose, Blood 116 (H) 65 - 99 mg/dL       Medications:  Scheduled Medications   Medication Dose Frequency    insulin GLARGINE  12 Units QAM INSULIN    miconazole   BID    amLODIPine  7.5 mg Q DAY    insulin lispro  2-12 Units 4X/DAY ACHS    vitamin D3  1,000 Units DAILY    senna-docusate  2 Tablet BID    omeprazole  20 mg DAILY    apixaban  5 mg BID    aspirin  81 mg Q EVENING    finasteride  5 mg QDAY    tamsulosin  0.4 mg DAILY      PRN medications: oxyCODONE immediate-release **OR** oxyCODONE immediate-release, Respiratory Therapy Consult, hydrALAZINE, acetaminophen, senna-docusate **AND** polyethylene glycol/lytes, mag hydrox-al hydrox-simeth, ondansetron **OR** ondansetron, traZODone, sodium chloride, [DISCONTINUED] insulin regular **AND** POC blood glucose manual result **AND** NOTIFY MD and PharmD **AND** Administer 20 grams of glucose (approximately 8 ounces of fruit juice) every 15 minutes PRN FSBG less than 70 mg/dL **AND** dextrose bolus, dicyclomine    Diet:  Current Diet Order   Procedures    Diet Order Diet: Cardiac; Second Modifier: (optional): Consistent CHO (Diabetic)       Medical Decision Making and Plan:  L CVA - 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 He has been started on ASA and Eliquis  -PT and OT for mobility and ADLs. Per guidelines, 15 hours per week between PT, OT and/or SLP.  -Follow-up Neurology     HTN/A Fib/sCHF - Patient on Eliquis. Patient on Amlodipine 5 mg daily.  Consult hospitalist. History of low SBP at times. SBP into 150s, per hospitalist increase to 7.5 mg Amlodipine. SBP into 140s, continue amlodipine 7.5 mg daily  -Chest pain on 2/7 with unchanged EKG. Hospitalist following, continue Amlodipine 7.5 mg daily. Chest pain daily, may be chest wall pain as work-up negative. Continue Amlodipine 7.5 mg daily     HLD - Patient allergic to statins.      Hyponatremia - Check AM CMP - 141, will monitor     DM2 with hyperglycemia - Patient on Lantus 10 daily and SSI. Consult hospitalist, Lantus increased. Continue 12 U qAM and SSI     BPH - Patient on Finasteride 5 mg and Flomax 0.4 mg. PVRs improving, continue Finasteride 5 mg and Flomax 0.4 mg    Dementia - Per family with dementia for past 2 years. Will monitor     Vitamin D deficiency - 27 on admission, started on 1000 U    Pain - Patient on PRN APAP and Oxycodone     Skin - Patient at risk for skin breakdown due to debility in areas including  sacrum, achilles, elbows and head in addition to other sites. Nursing to assess skin daily.   -Right foot/left ankle diabetic pressure ulcer.  Wound care consulted. XR concerning for osteomyelitis. Family would like to consider treatment, needs discussion with orthopedics and ID.      GI Ppx - Patient on Prilosec for GERD prophylaxis. Patient on Senna-docusate for constipation prophylaxis.      DVT Ppx - Patient Eliquis on transfer.    Dispo - Family with concerns about discharge. Family would like to pursue nonsurgical treatment but cannot take him home at this time. SNF referrals and outpatient referral to ID and Orthopedics for further discussion   ____________________________________    T. Edouard Starks MD/PhD  [facility] - Physical Medicine & Rehabilitation   [facility] - Brain Injury Medicine   ____________________________________

## 2024-02-14 NOTE — THERAPY
"Speech Language Pathology  Daily Treatment     Patient Name: Francesco Schulte  Age:  73 y.o., Sex:  male  Medical Record #: [id number]  Today's Date: 2/14/2024     Precautions  Precautions: Fall Risk  Comments: Per daughter report, \"underdiagnosed vascular dementia.\" Poor historian    Subjective    Patient requested therapy bedside d/t \"not feeling good.\" Patient reports pain in chest. RN notified and MD aware.      Objective       02/14/24 1002   Treatment Charges   SLP Cognitive Skill Development First 15 Minutes 1   SLP Cognitive Skill Development Additional 15 Minutes 1   SLP Total Time Spent   SLP Individual Total Time Spent (Mins) 30   Cognition   Medication Management  Moderate (3)   Interdisciplinary Plan of Care Collaboration   IDT Collaboration with  Nursing   Patient Position at End of Therapy In Bed;Bed Alarm On;Call Light within Reach;Tray Table within Reach   Collaboration Comments Patient c/o fatigue and pain in chest. Per RN, MD aware and monitoring.         Assessment    Patient required frequent cues in order to maintain alertness during session.   Able to ID med errors with 50% accuracy and significant extra time. Patient reports \"I hate this stuff.\"      Strengths: Able to follow instructions, Alert and oriented, Effective communication skills, Independent prior level of function, Willingly participates in therapeutic activities, Supportive family  Barriers: Impaired functional cognition    Plan    Functional problem solving.       Speech Therapy Problems (Active)       Problem: Memory STGs       Dates: Start:  02/06/24         Goal: STG-Within one week, patient will implement functional memory strategies with 80% accuracy provided MIN cues.       Dates: Start:  02/06/24               Problem: Problem Solving STGs       Dates: Start:  02/06/24         Goal: STG-Within one week, patient will complete functional medication and financial management tasks with 80% accuracy with MIN cues.       " Dates: Start:  02/06/24               Problem: Speech/Swallowing LTGs       Dates: Start:  02/06/24         Goal: LTG-By discharge, patient will complete functional problem solving and recall information with 80% accuracy provided MOD I.       Dates: Start:  02/06/24

## 2024-02-14 NOTE — CARE PLAN
Problem: Mobility  Goal: Patient's capacity to carry out activities will improve  Outcome: Progressing - witnessed patient walking with therapy and cane for DME.      Problem: Fall Risk - Rehab  Goal: Patient will remain free from falls  Outcome: Progressing - bed alarms in place and education provided.

## 2024-02-14 NOTE — PROGRESS NOTES
Spoke to Dr. Lorenz regarding patient's in-and-out chest pain. MD aware. Spoke to Dr. Starks with Tylenol med pass regarding patient's complaint of in-and-out chest pain. Patient unable to recall Dr. Lorenz, not this RN, nor Charge RN this morning assessing patient.

## 2024-02-14 NOTE — PROGRESS NOTES
Hospital Medicine Daily Progress Note        Chief Complaint:  Hypertension  Diabetes    Interval History:  No new complaints today.  Last 24 hours FSBS range:  125-171.  Labs reviewed.    Review of Systems  Review of Systems   Constitutional:  Negative for chills and fever.   HENT: Negative.     Eyes: Negative.    Respiratory:  Negative for cough and shortness of breath.    Cardiovascular:  Negative for chest pain and palpitations.   Gastrointestinal:  Negative for abdominal pain, nausea and vomiting.   Musculoskeletal: Negative.    Skin:  Negative for itching and rash.   Endo/Heme/Allergies:  Negative for polydipsia. Does not bruise/bleed easily.        Physical Exam  Temp:  [36.6 °C (97.8 °F)-36.8 °C (98.3 °F)] 36.6 °C (97.8 °F)  Pulse:  [62-68] 68  Resp:  [18] 18  BP: (123-138)/(72-73) 123/72  SpO2:  [92 %-94 %] 94 %    Physical Exam  Vitals reviewed.   Constitutional:       General: He is not in acute distress.     Appearance: Normal appearance. He is not ill-appearing.   HENT:      Head: Normocephalic and atraumatic.      Right Ear: External ear normal.      Left Ear: External ear normal.      Nose: Nose normal.      Mouth/Throat:      Pharynx: Oropharynx is clear.   Eyes:      General:         Right eye: No discharge.         Left eye: No discharge.      Extraocular Movements: Extraocular movements intact.      Conjunctiva/sclera: Conjunctivae normal.   Cardiovascular:      Rate and Rhythm: Normal rate and regular rhythm.   Pulmonary:      Effort: No respiratory distress.      Breath sounds: No wheezing.      Comments: Decreased BS  Abdominal:      General: Bowel sounds are normal. There is no distension.      Palpations: Abdomen is soft.      Tenderness: There is no abdominal tenderness.   Musculoskeletal:      Cervical back: Normal range of motion and neck supple.      Right lower leg: No edema.      Left lower leg: No edema.   Skin:     General: Skin is warm and dry.   Neurological:      Mental Status: He  is alert.      Comments: Awake and alert         Fluids    Intake/Output Summary (Last 24 hours) at 2/14/2024 1132  Last data filed at 2/14/2024 0954  Gross per 24 hour   Intake 440 ml   Output 700 ml   Net -260 ml       Laboratory  Recent Labs     02/13/24  0541   WBC 7.3   RBC 5.95   HEMOGLOBIN 15.4   HEMATOCRIT 47.5   MCV 79.8*   MCH 25.9*   MCHC 32.4   RDW 66.7*   PLATELETCT 469*   MPV 11.0     Recent Labs     02/14/24  0525   SODIUM 135   POTASSIUM 3.9   CHLORIDE 103   CO2 21   GLUCOSE 149*   BUN 23*   CREATININE 1.07   CALCIUM 9.1                 Assessment/Plan  * Left thalamic infarction (HCC)- (present on admission)  Assessment & Plan  On ASA and Eliquis  Ongoing management per Physiatry    Osteomyelitis of second toe of right foot (HCC)  Assessment & Plan  Physiatry coordinating care    Dementia (HCC)  Assessment & Plan  History noted    Paroxysmal A-fib (HCC)  Assessment & Plan  Echo EF 20%, mod AI, RVSP 53 mmHg  H/O AICD  On no rate controlling agents  Monitor for tachydysrhythmias  Anticoagulated on Eliquis  BNP improving    Vitamin D insufficiency  Assessment & Plan  Vit D level 27  On supplementation    Chest pain- (present on admission)  Assessment & Plan  Has had ongoing recurrent symptoms w/ repeatedly negative work up    Essential hypertension- (present on admission)  Assessment & Plan  Observe blood pressure trends on Norvasc  Monitor for orthostatic hypotension on Flomax and Proscar    Type 2 diabetes mellitus with hyperglycemia (HCC)- (present on admission)  Assessment & Plan  HbA1c 5.9  Observe serum glucose trends on Lantus and SSI  Outpt meds include Tresiba 10 u qd    Thrombocytosis- (present on admission)  Assessment & Plan  Likely reactive  Follow PLT       Full Code

## 2024-02-14 NOTE — THERAPY
"Physical Therapy   Daily Treatment     Patient Name: Francesco Schulte  Age:  73 y.o., Sex:  male  Medical Record #: [id number]  Today's Date: 2/14/2024     Precautions  Precautions: (P) Fall Risk  Comments: (P) Per daughter report, \"underdiagnosed vascular dementia.\" Poor historian    Subjective    Patient in bed, reporting chest pain and stating that he thinks someone is tampering with the schedule of his medications.    \"There's something really fishy going on around here.\"     Objective       02/14/24 1031   PT Charge Group   PT Therapeutic Activities (Units) 2   PT Total Time Spent   PT Individual Total Time Spent (Mins) 30   Precautions   Precautions Fall Risk   Comments Per daughter report, \"underdiagnosed vascular dementia.\" Poor historian   Pain 0 - 10 Group   Location Chest   Location Orientation Anterior   Gait Functional Level of Assist    Gait Level Of Assist Contact Guard Assist   Assistive Device Single Point Cane   Distance (Feet) 150   # of Times Distance was Traveled 1   Deviation Shuffled Gait  (dec foot clearance on RLE, general instability)   Transfer Functional Level of Assist   Bed, Chair, Wheelchair Transfer Standby Assist   Bed Chair Wheelchair Transfer Description Increased time;Initial preparation for task;Supervision for safety  (stand step transfer with no AD vs. SPC)   Bed Mobility    Supine to Sit Supervised   Sit to Supine Supervised   Sit to Stand Supervised   Scooting Supervised   Rolling Supervised   Interdisciplinary Plan of Care Collaboration   IDT Collaboration with  Nursing;Occupational Therapist   Patient Position at End of Therapy Other (Comments)  (standing with OT)   Collaboration Comments CLOF         Assessment    Patient tolerated session poorly, perseverating on pain meds and paranoid about management tampering with his medications. Unable to redirect patient, eventually agreeable to ambulate to gym to work with OT.    Strengths: Willingly participates in therapeutic " activities, Motivated for self care and independence  Barriers: Dementia, Impaired balance, Impaired activity tolerance, Fatigue (right foot drag with fatigue)    Plan    Standing balance, general strengthening/conditioning, gait training with SPC, stair/curb negotiation, OOB tolerance. Recently, overall participation in therapy has been inconsistent due to cognition and pain.      DME  PT DME Recommendations  Additional Equipment:  (LHS, reacher)     Passport items to be completed:  Get in/out of bed safely, in/out of a vehicle, safely use mobility device, walk or wheel around home/community, navigate up and down stairs, show how to get up/down from the ground, ensure home is accessible, demonstrate HEP, complete caregiver training    Physical Therapy Problems (Active)       Problem: Mobility       Dates: Start:  02/06/24         Goal: STG-Within one week, patient will ambulate community distances 200ft x 2 with left SPC SPV       Dates: Start:  02/06/24            Goal: STG-Within one week, patient will ascend and descend four to six stairs no rails with step over patterning SBA       Dates: Start:  02/06/24               Problem: Mobility Transfers       Dates: Start:  02/06/24         Goal: STG-Within one week, patient will transfer bed to chair SPV SPT       Dates: Start:  02/06/24               Problem: PT-Long Term Goals       Dates: Start:  02/06/24         Goal: LTG-By discharge, patient will ambulate 400ft left SPC mod I level surfaces, SPV outdoors       Dates: Start:  02/06/24            Goal: LTG-By discharge, patient will transfer one surface to another mod I SPT safely and consistently       Dates: Start:  02/06/24            Goal: LTG-By discharge, patient will perform home exercise program seated/standing for LE strengthening to be done 3x/day in safe, independent home environment       Dates: Start:  02/06/24            Goal: LTG-By discharge, patient will up/down curb step to simulate home  entrance with SPC with SPV       Dates: Start:  02/06/24

## 2024-02-15 PROBLEM — R79.89 AZOTEMIA: Status: ACTIVE | Noted: 2024-01-01

## 2024-02-15 NOTE — THERAPY
Missed Therapy    Patient Name: Francesco Schulte  Age:  73 y.o., Sex:  male  Medical Record #: [id number]  Today's Date: 2/15/2024    Discussed missed therapy with RN, CNA, MD and         02/15/24 0733   Therapy Missed   Missed Therapy (Minutes) 30   Reason For Missed Therapy Medical - Patient on Hold from Therapy;Medical - Patient not Able To Participate  (pt with c/o chest pain and overall discomfort, RN and MD aware, med hold placed.)

## 2024-02-15 NOTE — PROGRESS NOTES
Pt was medicated with oxycodone 10 mg for chest pain, trazodone 50 mg given for sleep[ see mar] per request. No further complain of chest pain noted.Pt complained of anterior neck pain, pt able to swallow med. Reassurance provided, head lights on , for cont monitoring. Repositioned in bed, call light bedside table within easy reach, safety measures enforced  No untoward s/s noted. Cont monitored for any occurrence of chest pain .

## 2024-02-15 NOTE — CARE PLAN
Problem: Mobility  Goal: STG-Within one week, patient will ambulate community distances 200ft x 2 with left SPC SPV  Outcome: Discharged - Not Met  Goal: STG-Within one week, patient will ascend and descend four to six stairs no rails with step over patterning SBA  Outcome: Discharged - Not Met     Problem: PT-Long Term Goals  Goal: LTG-By discharge, patient will ambulate 400ft left SPC mod I level surfaces, SPV outdoors  Outcome: Discharged - Not Met  Goal: LTG-By discharge, patient will transfer one surface to another mod I SPT safely and consistently  Outcome: Discharged - Not Met  Goal: LTG-By discharge, patient will perform home exercise program seated/standing for LE strengthening to be done 3x/day in safe, independent home environment  Outcome: Discharged - Not Met  Goal: LTG-By discharge, patient will up/down curb step to simulate home entrance with SPC with SPV  Outcome: Discharged - Not Met     Problem: Mobility Transfers  Goal: STG-Within one week, patient will transfer bed to chair SPV SPT  Outcome: Met

## 2024-02-15 NOTE — CARE PLAN
Problem: Dressing  Goal: STG-Within one week, patient will dress UB and LB at SPV using LRD  Outcome: Progressing     Problem: Functional Transfers  Goal: STG-Within one week, patient will transfer to tub/shower at SBA to [facility] using LRD  Outcome: Progressing     Problem: IADL's  Goal: STG-Within one week, patient will access kitchen area at SBA to [facility] using LRD  Outcome: Progressing     Problem: Toileting  Goal: STG-Within one week, patient will complete toileting tasks at SB to [facility] using LRD  Outcome: Met     Problem: Functional Transfers  Goal: STG-Within one week, patient will transfer to toilet at SB to [facility] using LRD  Outcome: Met

## 2024-02-15 NOTE — PROGRESS NOTES
"                                                         NURSING DAILY NOTE    Name: Francesco Schulte   Date of Admission: 2/5/2024   Admitting Diagnosis: Left thalamic infarction (HCC)  Attending Physician: Jorgito Starks M.d.  Allergies: Atorvastatin, Doxycycline, Lisinopril, Simvastatin, Statins [hmg-coa-r inhibitors], Beta adrenergic blockers, Eplerenone, Metformin, and Spironolactone    Safety  Patient Assist  min/mod  Patient Precautions  Fall Risk  Precaution Comments  Per daughter report, \"underdiagnosed vascular dementia.\" Poor historian  Bed Transfer Status  Standby Assist  Toilet Transfer Status   Standby Assist  Assistive Devices  Wheelchair  Oxygen  None - Room Air  Diet/Therapeutic Dining  Current Diet Order   Procedures    Diet Order Diet: Cardiac; Second Modifier: (optional): Consistent CHO (Diabetic)     Pill Administration  whole  Agitated Behavioral Scale  16  ABS Level of Severity  No Agitation    Fall Risk  Has the patient had a fall this admission?   No  Ghazal Mcelroy Fall Risk Scoring  16, HIGH RISK  Fall Risk Safety Measures  bed alarm, chair alarm, and poor balance    Vitals  Temperature: 36.7 °C (98 °F)  Temp src: Oral  Pulse: 72  Respiration: 17  Blood Pressure : (!) 144/80  Blood Pressure MAP (Calculated): 101 MM HG  BP Location: Left, Upper Arm  Patient BP Position: Supine     Oxygen  Pulse Oximetry: 93 %  O2 (LPM): 2  O2 Delivery Device: None - Room Air    Bowel and Bladder  Last Bowel Movement  02/14/24  Stool Type  Type 4: Like a sausage or snake, smooth and soft  Bowel Device  Bathroom, Other (Comment) (Bowel Meds)  Continent  Bladder: Did not void (no void since admit)   Bowel: No movement (no BM since admit)  Bladder Function  Urine Void (mL): 250 ml  Number of Times Voided: 1  Urinary Options: Yes  Urine Color: Adrienne  Genitourinary Assessment   Bladder Assessment (WDL):  WDL Except  Andres Catheter: Not Applicable  Urine Color: Adrienne  Bladder Device: Bathroom, " Diaper  Bladder Scan: Post Void  $ Bladder Scan Results (mL): 142  Bladder Medications: Yes    Skin  Neal Score   16  Sensory Interventions   Bed Types: Standard/Trauma Mattress with Overlay  Skin Preventative Measures: Pillows in Use for Support / Positioning  Moisture Interventions         Pain  Pain Rating Scale  8 - Awful, hard to do anything  Pain Location  Chest, Rib Cage  Pain Location Orientation  Anterior  Pain Interventions   [place]    ADLs    Bathing      Linen Change      Personal Hygiene     Chlorhexidine Bath      Oral Care     Teeth/Dentures     Shave     Nutrition Percentage Eaten  Lunch, Less than 25% Consumed  Environmental Precautions  Treaded Slipper Socks on Patient, Personal Belongings, Wastebasket, [place] etc. in Easy Reach, Bed in Low Position  Patient Turns/Positioning  Patient Turns Self from Side to Side  Patient Turns Assistance/Tolerance  Assistance of One, Does Not Tolerate  Bed Positions  Bed Controls On, Bed Locked  Head of Bed Elevated  Self regulated      Psychosocial/Neurologic Assessment  Psychosocial Assessment  Psychosocial (WDL):  WDL Except  Patient Behaviors: Confused  Neurologic Assessment  Neuro (WDL): Exceptions to WDL  Level of Consciousness: Responds to voice (sleeping)  Orientation Level: Oriented to place, Oriented to situation, Oriented to person, Disoriented to time  Cognition: Follows commands  Speech: Clear  Pupil Assesment: No  Motor Function/Sensation Assessment: Motor strength, Sensation  RUE Sensation: Tingling, Numbness  Muscle Strength Right Arm: Good Strength Against Gravity and Moderate Resistance  LUE Sensation: Full sensation  Muscle Strength Left Arm: Good Strength Against Gravity and Moderate Resistance  RLE Sensation: Numbness, Tingling  Muscle Strength Right Leg: Good Strength Against Gravity and Moderate Resistance  LLE Sensation: Full sensation  Muscle Strength Left Leg: Good Strength Against Gravity and Moderate Resistance  EENT (WDL):  WDL  Except    Cardio/Pulmonary Assessment  Edema      Respiratory Breath Sounds  RUL Breath Sounds: Clear  RML Breath Sounds: Clear  RLL Breath Sounds: Diminished  EVELINA Breath Sounds: Clear  LLL Breath Sounds: Diminished  Cardiac Assessment   Cardiac (WDL):  WDL Except (hx afib, cardiomyopathy, aicd, cad, stemi, htn, hld, chf)

## 2024-02-15 NOTE — FLOWSHEET NOTE
02/15/24 0708   Events/Summary/Plan   Events/Summary/Plan RA pulse ox check   Vital Signs   Pulse 64   Respiration 16   Pulse Oximetry 94 %   $ Pulse Oximetry (Spot Check) Yes   Respiratory Assessment   Level of Consciousness Responds to voice  (sleeping)   Chest Exam   Work Of Breathing / Effort Within Normal Limits   Oxygen   O2 Delivery Device Room air w/o2 available

## 2024-02-15 NOTE — DISCHARGE PLANNING
Case management  Reviewed signed copy of IMM and answered all questions.    Dc date /disposition:2/16/24 [facility]

## 2024-02-15 NOTE — DISCHARGE SUMMARY
Physical Medicine & Rehabilitation Discharge Summary    Admission Date: 2/5/2024    Discharge Date: 2/16/2024    Attending Provider: Jorgito Starks MD/PhD    Admission Diagnosis:   Active Hospital Problems    Diagnosis     *Left thalamic infarction (HCC)     Azotemia     Osteoarthritis of toe     Osteomyelitis of second toe of right foot (HCC)     Dementia (HCC)     Vitamin D insufficiency     Paroxysmal A-fib (HCC)     CHF (congestive heart failure) (HCC)     Chest pain     Essential hypertension     Type 2 diabetes mellitus with hyperglycemia (HCC)     Thrombocytosis        Discharge Diagnosis:  Active Hospital Problems    Diagnosis     *Left thalamic infarction (HCC)     Azotemia     Osteoarthritis of toe     Osteomyelitis of second toe of right foot (HCC)     Dementia (HCC)     Vitamin D insufficiency     Paroxysmal A-fib (HCC)     CHF (congestive heart failure) (HCC)     Chest pain     Essential hypertension     Type 2 diabetes mellitus with hyperglycemia (HCC)     Thrombocytosis        HPI per Admission History & Physical:  Patient is a 73 y.o. male with a PMH of DM2, HTN, A fib, CHF with EF of 15-25% s/p AICD, HLD, Lupus, and polycythemia vera who presented with new right sided weakness on 2/2/24. CT and MRI were consistent with right occipital CVA. Patient reportedly cannot have statins and has had a history of low SBP. He was started on Amlodipine and monitored. Patient with elevated troponins which were trended down.     Patient was admitted to [facility] on 2/5/2024.     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 He has been started on ASA and Eliquis  -PT and OT for mobility and ADLs. Per guidelines, 15 hours per week between PT, OT and/or SLP.  -Follow-up Neurology     HTN/A Fib/sCHF - Patient on Eliquis. Patient on Amlodipine 5 mg daily.  Consult hospitalist. History of low SBP at times. SBP into 150s, per hospitalist  increase to 7.5 mg Amlodipine. SBP into 140s, continue amlodipine 7.5 mg daily  -Chest pain on 2/7 with unchanged EKG. Hospitalist following, continue Amlodipine 7.5 mg daily. Chest pain daily, may be chest wall pain as work-up negative. Continue Amlodipine 7.5 mg daily     HLD - Patient allergic to statins.      Hyponatremia - Check AM CMP - 141, will monitor     DM2 with hyperglycemia - Patient on Lantus 10 daily and SSI. Consult hospitalist, Lantus increased. Continue Lantus 12 U and SSI     BPH - Patient on Finasteride 5 mg and Flomax 0.4 mg. PVRs improving, continue Finasteride 5 mg and Flomax 0.4 mg     Dementia - Per family with dementia for past 2 years. Will monitor  -Worsened confusion on 2/14/24, UA borderline. Sent UCx     Vitamin D deficiency - 27 on admission, started on 1000 U     Pain - Patient on PRN APAP and Oxycodone     Skin - Patient at risk for skin breakdown due to debility in areas including sacrum, achilles, elbows and head in addition to other sites. Nursing to assess skin daily.   -Right foot/left ankle diabetic pressure ulcer.  Wound care consulted. XR concerning for osteomyelitis. Family would like to consider treatment, needs discussion with orthopedics and ID as outpatient. Referrals placed for Ortho and ID, discussed with inpatient ID and recommending surgical treatment. Family would speak to ortho first     GI Ppx - Patient on Prilosec for GERD prophylaxis. Patient on Senna-docusate for constipation prophylaxis.      DVT Ppx - Patient Eliquis on transfer.     Dispo - Family with concerns about discharge. Family would like to pursue nonsurgical treatment but cannot take him home at this time. SNF referrals and outpatient referral to ID and Orthopedics for further discussion     Functional Status at Discharge  Eating:  Independent  Eating Description:     Grooming:  Standby Assist, Standing  Grooming Description:  Increased time, Standing at sink, Supervision for safety  Bathing:    (Declined shower)  Bathing Description:  Adaptive equipment, Grab bar, Hand rails, Hand held shower, Long handled bath tool, Tub bench, Increased time, Set-up of equipment, Verbal cueing (CGA for standing balance using GB. In standing, pt able to wash and dry upper and lower body (using LHS).)  Upper Body Dressing:  Stand by Assist  Upper Body Dressing Description:  Increased time, Initial preparation for task, Set-up of equipment, Supervision for safety  Lower Body Dressing:  Supervision  Lower Body Dressing Description:  Increased time, Supervision for safety (EOB)     Walk:  Contact Guard Assist  Distance Walked:  150  Number of Times Distance Was Traveled:  1  Assistive Device:  Single Point Cane  Gait Deviation:  Shuffled Gait (dec foot clearance on RLE, general instability)  Wheelchair:  Stand by Assist  Distance Propelled:  100   Wheelchair Description:   (bilateral LE propulsion)  Stairs Standby Assist  Stairs Description Hand rails, Verbal cueing     Comprehension:  Modified Independent  Comprehension Description:  Glasses, Verbal cues  Expression:  Modified Independent  Expression Description:  Verbal cueing  Social Interaction:  Independent  Social Interaction Description:  Increased time, Verbal cues  Problem Solving:  Moderate Assist  Problem Solving Description:  Medication, Increased time, Therapy schedule, Verbal cueing, Supervision  Memory:  Total Assist  Memory Description:  Medication, Increased time, Supervision, Therapy schedule, Verbal cueing       IJorgito M.D., personally performed a complete drug regimen review and no potential clinically significant medication issues were identified.   Discharge Medication:     Medication List        START taking these medications        Instructions   aspirin 81 MG EC tablet   Take 1 Tablet by mouth every evening.  Dose: 81 mg     insulin lispro 100 UNIT/ML Sopn injection PEN  Commonly known as: HumaLOG/AdmeLOG   Inject 2-12 Units under  the skin 4 Times a Day,Before Meals and at Bedtime.  Dose: 2-12 Units     Lantus SoloStar 100 UNIT/ML Sopn injection  Generic drug: insulin glargine   Inject 12 Units under the skin every day.  Dose: 12 Units     oxyCODONE immediate release 10 MG immediate release tablet  Commonly known as: Roxicodone   Take 1-1.5 Tablets by mouth every 3 hours as needed for Severe Pain for up to 3 days.  Dose: 10-15 mg     vitamin D 1000 UNIT Tabs  Start taking on: February 16, 2024  Commonly known as: Cholecalciferol   Take 1 Tablet by mouth every day.  Dose: 1,000 Units            CHANGE how you take these medications        Instructions   amLODIPine 5 MG Tabs  What changed: how much to take  Commonly known as: Norvasc   Take 1.5 Tablets by mouth every day.  Dose: 7.5 mg            CONTINUE taking these medications        Instructions   apixaban 5mg Tabs  Commonly known as: Eliquis   Take 1 Tablet by mouth 2 times a day.  Dose: 5 mg     dicyclomine 10 MG Caps  Commonly known as: Bentyl   TAKE 1 CAPSULE BY MOUTH EVERY 6 HOURS AS NEEDED (ABDOMINAL CRAMPS).  Dose: 10 mg     finasteride 5 MG Tabs  Commonly known as: Proscar   Doctor's comments: DX Code Needed  .  TAKE 1 TABLET BY MOUTH EVERY DAY  Dose: 5 mg     hydroxyurea 500 MG Caps  Commonly known as: Hydrea   Take 500 mg by mouth every morning.  Dose: 500 mg     tamsulosin 0.4 MG capsule  Commonly known as: Flomax   Take 1 Capsule by mouth every day.  Dose: 0.4 mg            STOP taking these medications      Farxiga 5 MG Tabs  Generic drug: dapagliflozin propanediol     Tresiba FlexTouch 100 UNIT/ML Sopn  Generic drug: Insulin Degludec              Discharge Diet:  Current Diet Order   Procedures    Diet Order Diet: Cardiac; Second Modifier: (optional): Consistent CHO (Diabetic)       Discharge Activity:  As tolerated     Disposition:  Patient to discharge to SNF for ongoing rehabilitation services.    Equipment:  TBD    Follow-up & Discharge Instructions:  Follow up with your  primary care provider (PCP) within 7-10 days of discharge to review your medications and take over your care.     If you develop chest pain, fever, chills, change in neurologic function (weakness, sensation changes, vision changes), or other concerning sxs, seek immediate medical attention or call 911.      Future Appointments   Date Time Provider Department Center   2/15/2024  2:30 PM Radhika Khan DPT [facility] None   2/22/2024  3:15 PM Shannon Martinez M.D. [facility] None   2/23/2024  2:00 PM ALVINO [facility]       Condition on Discharge:  Fair    More than 32 minutes was spent on discharging this patient, including face-to-face time, prescription management, and the dictation of this note.    Jorgito Starks M.D.    Date of Service: 2/16/2024

## 2024-02-15 NOTE — DISCHARGE PLANNING
Case Management/IDT follow up.   IDT continues to recommend IRF level of care as patient continue to make progress with all therapies.   Projected dc date set for 2/16/24      DC needs:  Recommendations made for SNF      Met with patient and family providing update from IDT and discussed plan of care.    Plan:  Continue to follow

## 2024-02-15 NOTE — PROGRESS NOTES
Hospital Medicine Daily Progress Note        Chief Complaint:  Hypertension  Diabetes    Interval History:  Blood sugars much better.  Last 24 hours FSBS range:  .    Review of Systems  Review of Systems   Constitutional:  Negative for chills and fever.   HENT: Negative.     Eyes: Negative.    Respiratory:  Negative for cough and shortness of breath.    Cardiovascular:  Negative for chest pain and palpitations.   Gastrointestinal:  Negative for abdominal pain, nausea and vomiting.   Musculoskeletal: Negative.    Skin:  Negative for itching and rash.   Endo/Heme/Allergies:  Negative for polydipsia. Does not bruise/bleed easily.        Physical Exam  Temp:  [36.3 °C (97.3 °F)-37.1 °C (98.7 °F)] 36.3 °C (97.3 °F)  Pulse:  [64-85] 72  Resp:  [16-18] 17  BP: (120-156)/(70-97) 124/75  SpO2:  [89 %-99 %] 95 %    Physical Exam  Vitals reviewed.   Constitutional:       General: He is not in acute distress.     Appearance: Normal appearance. He is not ill-appearing.   HENT:      Head: Normocephalic and atraumatic.      Right Ear: External ear normal.      Left Ear: External ear normal.      Nose: Nose normal.      Mouth/Throat:      Pharynx: Oropharynx is clear.   Eyes:      General:         Right eye: No discharge.         Left eye: No discharge.      Extraocular Movements: Extraocular movements intact.      Conjunctiva/sclera: Conjunctivae normal.   Cardiovascular:      Rate and Rhythm: Normal rate and regular rhythm.   Pulmonary:      Effort: No respiratory distress.      Breath sounds: No wheezing.      Comments: Decreased BS  Abdominal:      General: Bowel sounds are normal. There is no distension.      Palpations: Abdomen is soft.      Tenderness: There is no abdominal tenderness.   Musculoskeletal:      Cervical back: Normal range of motion and neck supple.      Right lower leg: No edema.      Left lower leg: No edema.   Skin:     General: Skin is warm and dry.   Neurological:      Mental Status: He is alert.       Comments: Awake and alert         Fluids    Intake/Output Summary (Last 24 hours) at 2/15/2024 1010  Last data filed at 2/15/2024 0840  Gross per 24 hour   Intake --   Output 700 ml   Net -700 ml       Laboratory  Recent Labs     02/13/24  0541   WBC 7.3   RBC 5.95   HEMOGLOBIN 15.4   HEMATOCRIT 47.5   MCV 79.8*   MCH 25.9*   MCHC 32.4   RDW 66.7*   PLATELETCT 469*   MPV 11.0     Recent Labs     02/14/24  0525   SODIUM 135   POTASSIUM 3.9   CHLORIDE 103   CO2 21   GLUCOSE 149*   BUN 23*   CREATININE 1.07   CALCIUM 9.1                 Assessment/Plan  * Left thalamic infarction (HCC)- (present on admission)  Assessment & Plan  On ASA and Eliquis  Ongoing management per Physiatry    Azotemia  Assessment & Plan  Encourage PO fluids  Follow renal function    Osteomyelitis of second toe of right foot (HCC)  Assessment & Plan  Physiatry coordinating care    Dementia ([facility])  Assessment & Plan  History noted    Paroxysmal A-fib ([facility])  Assessment & Plan  Echo EF 20%, mod AI, RVSP 53 mmHg  H/O AICD  On no rate controlling agents  Monitor for tachydysrhythmias  Anticoagulated on Eliquis  BNP improving    Vitamin D insufficiency  Assessment & Plan  Vit D level 27  On supplementation    Chest pain- (present on admission)  Assessment & Plan  Has had ongoing recurrent symptoms w/ repeatedly negative work up    Essential hypertension- (present on admission)  Assessment & Plan  Blood pressure controlled on Norvasc  Monitor for orthostatic hypotension on Flomax and Proscar    Type 2 diabetes mellitus with hyperglycemia ([facility])- (present on admission)  Assessment & Plan  HbA1c 5.9  Serum glucose control improved w/ increased Lantus  Continue SSI while inpt  Outpt meds include Tresiba 10 u qd    Thrombocytosis- (present on admission)  Assessment & Plan  Likely reactive  Follow PLT       Full Code    Discussed w/ pt's daughter 2/14/24.  Reviewed w/ Dr. Straks.

## 2024-02-15 NOTE — PROGRESS NOTES
Physical Medicine & Rehabilitation Progress Note    Encounter Date: 2/15/2024    Chief Complaint: Decreased mobility, weakness    Interval Events (Subjective):  Patient sitting up in room. He reports he is no longer having chest pain. He reports the chest pain is right below his left clavicle. No reported fall. Denies changes in sensation. Discussed would have IDT later today to discuss discharge plan to SNF.     _____________________________________  Interdisciplinary Team Conference   Most recent IDT on 2/15/2024    IJorgito M.D./Ph.D., was present and led the interdisciplinary team conference on 2/15/2024.  I led the IDT conference and agree with the IDT conference documentation and plan of care as noted below.     Nursing:  Diet Current Diet Order   Procedures    Diet Order Diet: Cardiac; Second Modifier: (optional): Consistent CHO (Diabetic)       Eating ADL Independent      % of Last Meal  Oral Nutrition: Lunch, Between 25-50% Consumed   Sleep    Bowel Last BM: 02/14/24   Bladder    Barriers to Discharge Home:      Physical Therapy:  Bed Mobility    Transfers Standby Assist  Increased time, Initial preparation for task, Supervision for safety (stand step transfer with no AD vs. SPC)   Mobility Contact Guard Assist   Stairs    Barriers to Discharge Home:  Hard to redirect at times    Occupational Therapy:  Grooming Standby Assist, Standing   Bathing  (Declined shower)   UB Dressing Stand by Assist   LB Dressing Supervision   Toileting Supervision   Shower & Transfer    Barriers to Discharge Home:  Cognitive  Limited progress    Speech-Language Pathology:  Comprehension:  Modified Independent  Comprehension Description:  Glasses, Verbal cues  Expression:  Modified Independent  Expression Description:  Verbal cueing  Social Interaction:  Independent  Social Interaction Description:  Increased time, Verbal cues  Problem Solving:  Moderate Assist  Problem Solving Description:  Medication,  "Increased time, Therapy schedule, Verbal cueing, Supervision  Memory:  Total Assist  Memory Description:  Medication, Increased time, Supervision, Therapy schedule, Verbal cueing  Barriers to Discharge Home:  Memory and problem solving  Variable performance    Respiratory Therapy:  O2 (LPM): 2  O2 Delivery Device: None - Room Air    Case Management:  Continues to work on disposition and DME needs.      Discharge Date/Disposition:  2/19/24 - > 2/16/24  _____________________________________      Objective:  VITAL SIGNS: BP (!) 144/80   Pulse 72   Temp 36.7 °C (98 °F) (Oral)   Resp 17   Ht 1.854 m (6' 1\")   Wt 80.7 kg (178 lb)   SpO2 93%   BMI 23.48 kg/m²   Gen: NAD  Psych: Mood and affect appropriate  CV: RRR, 0 edema  Resp: CTAB, no upper airway sounds  Abd: NTND  Neuro: AOx2, following commands  Unchanged from 2/14/24 except AOx3 on 2/15/24      Laboratory Values:  Recent Results (from the past 72 hour(s))   POCT glucose device results    Collection Time: 02/12/24  5:41 PM   Result Value Ref Range    POC Glucose, Blood 108 (H) 65 - 99 mg/dL   POCT glucose device results    Collection Time: 02/12/24  8:58 PM   Result Value Ref Range    POC Glucose, Blood 109 (H) 65 - 99 mg/dL   CBC WITHOUT DIFFERENTIAL    Collection Time: 02/13/24  5:41 AM   Result Value Ref Range    WBC 7.3 4.8 - 10.8 K/uL    RBC 5.95 4.70 - 6.10 M/uL    Hemoglobin 15.4 14.0 - 18.0 g/dL    Hematocrit 47.5 42.0 - 52.0 %    MCV 79.8 (L) 81.4 - 97.8 fL    MCH 25.9 (L) 27.0 - 33.0 pg    MCHC 32.4 32.3 - 36.5 g/dL    RDW 66.7 (H) 35.9 - 50.0 fL    Platelet Count 469 (H) 164 - 446 K/uL    MPV 11.0 9.0 - 12.9 fL   POCT glucose device results    Collection Time: 02/13/24  7:11 AM   Result Value Ref Range    POC Glucose, Blood 137 (H) 65 - 99 mg/dL   POCT glucose device results    Collection Time: 02/13/24 11:14 AM   Result Value Ref Range    POC Glucose, Blood 171 (H) 65 - 99 mg/dL   POCT glucose device results    Collection Time: 02/13/24  5:19 PM "   Result Value Ref Range    POC Glucose, Blood 125 (H) 65 - 99 mg/dL   POCT glucose device results    Collection Time: 02/13/24  9:27 PM   Result Value Ref Range    POC Glucose, Blood 133 (H) 65 - 99 mg/dL   proBrain Natriuretic Peptide, NT    Collection Time: 02/14/24  5:25 AM   Result Value Ref Range    NT-proBNP 2554 (H) 0 - 125 pg/mL   Basic Metabolic Panel    Collection Time: 02/14/24  5:25 AM   Result Value Ref Range    Sodium 135 135 - 145 mmol/L    Potassium 3.9 3.6 - 5.5 mmol/L    Chloride 103 96 - 112 mmol/L    Co2 21 20 - 33 mmol/L    Glucose 149 (H) 65 - 99 mg/dL    Bun 23 (H) 8 - 22 mg/dL    Creatinine 1.07 0.50 - 1.40 mg/dL    Calcium 9.1 8.5 - 10.5 mg/dL    Anion Gap 11.0 7.0 - 16.0   ESTIMATED GFR    Collection Time: 02/14/24  5:25 AM   Result Value Ref Range    GFR (CKD-EPI) 74 >60 mL/min/1.73 m 2   POCT glucose device results    Collection Time: 02/14/24  7:20 AM   Result Value Ref Range    POC Glucose, Blood 152 (H) 65 - 99 mg/dL   POCT glucose device results    Collection Time: 02/14/24 10:58 AM   Result Value Ref Range    POC Glucose, Blood 116 (H) 65 - 99 mg/dL   URINALYSIS    Collection Time: 02/14/24  4:45 PM    Specimen: Urine, Clean Catch   Result Value Ref Range    Color Yellow     Character Clear     Specific Gravity 1.016 <1.035    Ph 5.5 5.0 - 8.0    Glucose Negative Negative mg/dL    Ketones Negative Negative mg/dL    Protein 30 (A) Negative mg/dL    Bilirubin Negative Negative    Urobilinogen, Urine 0.2 Negative    Nitrite Negative Negative    Leukocyte Esterase Small (A) Negative    Occult Blood Trace (A) Negative    Micro Urine Req Microscopic    URINE MICROSCOPIC (W/UA)    Collection Time: 02/14/24  4:45 PM   Result Value Ref Range    WBC 10-20 (A) /hpf    RBC 2-5 (A) /hpf    Bacteria Negative None /hpf    Epithelial Cells Negative /hpf    Hyaline Cast 0-2 /lpf   POCT glucose device results    Collection Time: 02/14/24  5:23 PM   Result Value Ref Range    POC Glucose, Blood 95 65 -  99 mg/dL   POCT glucose device results    Collection Time: 02/14/24  9:33 PM   Result Value Ref Range    POC Glucose, Blood 144 (H) 65 - 99 mg/dL   POCT glucose device results    Collection Time: 02/15/24  7:22 AM   Result Value Ref Range    POC Glucose, Blood 135 (H) 65 - 99 mg/dL       Medications:  Scheduled Medications   Medication Dose Frequency    insulin GLARGINE  12 Units QAM INSULIN    miconazole   BID    amLODIPine  7.5 mg Q DAY    insulin lispro  2-12 Units 4X/DAY ACHS    vitamin D3  1,000 Units DAILY    senna-docusate  2 Tablet BID    omeprazole  20 mg DAILY    apixaban  5 mg BID    aspirin  81 mg Q EVENING    finasteride  5 mg QDAY    tamsulosin  0.4 mg DAILY     PRN medications: oxyCODONE immediate-release **OR** oxyCODONE immediate-release, Respiratory Therapy Consult, hydrALAZINE, acetaminophen, senna-docusate **AND** polyethylene glycol/lytes, mag hydrox-al hydrox-simeth, ondansetron **OR** ondansetron, traZODone, sodium chloride, [DISCONTINUED] insulin regular **AND** POC blood glucose manual result **AND** NOTIFY MD and PharmD **AND** Administer 20 grams of glucose (approximately 8 ounces of fruit juice) every 15 minutes PRN FSBG less than 70 mg/dL **AND** dextrose bolus, dicyclomine    Diet:  Current Diet Order   Procedures    Diet Order Diet: Cardiac; Second Modifier: (optional): Consistent CHO (Diabetic)       Medical Decision Making and Plan:  L CVA - 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 He has been started on ASA and Eliquis  -PT and OT for mobility and ADLs. Per guidelines, 15 hours per week between PT, OT and/or SLP.  -Follow-up Neurology     HTN/A Fib/sCHF - Patient on Eliquis. Patient on Amlodipine 5 mg daily.  Consult hospitalist. History of low SBP at times. SBP into 150s, per hospitalist increase to 7.5 mg Amlodipine. SBP into 140s, continue amlodipine 7.5 mg daily  -Chest pain on 2/7 with unchanged EKG. Hospitalist following, continue Amlodipine 7.5 mg daily. Chest pain  daily, may be chest wall pain as work-up negative. Continue Amlodipine 7.5 mg daily     HLD - Patient allergic to statins.      Hyponatremia - Check AM CMP - 141, will monitor     DM2 with hyperglycemia - Patient on Lantus 10 daily and SSI. Consult hospitalist, Lantus increased. Continue Lantus 12 U and SSI     BPH - Patient on Finasteride 5 mg and Flomax 0.4 mg. PVRs improving, continue Finasteride 5 mg and Flomax 0.4 mg    Dementia - Per family with dementia for past 2 years. Will monitor  -Worsened confusion on 2/14/24, UA borderline. Sent UCx     Vitamin D deficiency - 27 on admission, started on 1000 U    Pain - Patient on PRN APAP and Oxycodone     Skin - Patient at risk for skin breakdown due to debility in areas including sacrum, achilles, elbows and head in addition to other sites. Nursing to assess skin daily.   -Right foot/left ankle diabetic pressure ulcer.  Wound care consulted. XR concerning for osteomyelitis. Family would like to consider treatment, needs discussion with orthopedics and ID.      GI Ppx - Patient on Prilosec for GERD prophylaxis. Patient on Senna-docusate for constipation prophylaxis.      DVT Ppx - Patient Eliquis on transfer.    Dispo - Family with concerns about discharge. Family would like to pursue nonsurgical treatment but cannot take him home at this time. SNF referrals and outpatient referral to ID and Orthopedics for further discussion   ____________________________________    T. Edouard Starks MD/PhD  [facility] - Physical Medicine & Rehabilitation   [facility] - Brain Injury Medicine   ____________________________________

## 2024-02-15 NOTE — THERAPY
"Missed Therapy    Patient Name: Francesco Schulte  Age:  73 y.o., Sex:  male  Medical Record #: [id number]  Today's Date: 2/15/2024    Discussed missed therapy with RN, MD, Therapy        02/15/24 1001   Therapy Missed   Missed Therapy (Minutes) 60   Reason For Missed Therapy Medical - Patient on Hold from Therapy  (Attempted to see pt for tx; however, pt declined. Pt c/o chest pain and stated he was \"not supposed to get up with therapy in order to let the new medicine kick in and I don't vomit and make a mess everywhere\". Educated pt on importance of therapy and he is cleared by MD and RN to get OOB for shower; however, pt continued to decline to participate and did not want to get OOB.)       "

## 2024-02-15 NOTE — CARE PLAN
"  Problem: Pain - Standard  Goal: Alleviation of pain or a reduction in pain to the patient’s comfort goal  Outcome: Progressing  Note: Assessed for pain and discomfort , medicated for chest pain with oxycodone 10 mg for pain with relief[ see mar] . Needs anticipated and attended.     Problem: Fall Risk - Rehab  Goal: Patient will remain free from falls  Outcome: Progressing  Note: Ghazal Mcelroy Fall risk Assessment Score: 16    High fall risk Interventions   - Alarming seatbelt  - Bed and strip alarm   - Yellow sign by the door   - Yellow wrist band \"Fall risk\"  - Room near to the nurse station  - Do not leave patient unattended in the bathroom  - Fall risk education provided      The patient is Stable - Low risk of patient condition declining or worsening    Shift Goals  Clinical Goals: Safety  Patient Goals: Sleep well    Progress made toward(s) clinical / shift goals:  Pt free from pain and discomfort .    "

## 2024-02-15 NOTE — PROGRESS NOTES
"                                                         NURSING DAILY NOTE    Name: Francesco Schulte   Date of Admission: 2/5/2024   Admitting Diagnosis: Left thalamic infarction (HCC)  Attending Physician: Jorgito Starks M.d.  Allergies: Atorvastatin, Doxycycline, Lisinopril, Simvastatin, Statins [hmg-coa-r inhibitors], Beta adrenergic blockers, Eplerenone, Metformin, and Spironolactone    Safety  Patient Assist  min/mod  Patient Precautions  Fall Risk  Precaution Comments  Per daughter report, \"underdiagnosed vascular dementia.\" Poor historian  Bed Transfer Status  Standby Assist  Toilet Transfer Status   Standby Assist  Assistive Devices  Wheelchair  Oxygen  Nasal Cannula  Diet/Therapeutic Dining  Current Diet Order   Procedures    Diet Order Diet: Cardiac; Second Modifier: (optional): Consistent CHO (Diabetic)     Pill Administration  whole and one at a time   Agitated Behavioral Scale  16  ABS Level of Severity  No Agitation    Fall Risk  Has the patient had a fall this admission?   No  Ghazal Mcelroy Fall Risk Scoring  16, HIGH RISK  Fall Risk Safety Measures  bed alarm, chair alarm, seatbelt alarm, poor balance, and low vision/ hearing    Vitals  Temperature: 36.5 °C (97.7 °F)  Temp src: Oral  Pulse: 67  Respiration: 18  Blood Pressure : 122/71  Blood Pressure MAP (Calculated): 88 MM HG  BP Location: Right, Upper Arm  Patient BP Position: Supine     Oxygen  Pulse Oximetry: 97 %  O2 (LPM): 2  O2 Delivery Device: Nasal Cannula    Bowel and Bladder  Last Bowel Movement  02/14/24  Stool Type  Type 4: Like a sausage or snake, smooth and soft  Bowel Device  Bathroom, Other (Comment) (Bowel Meds)  Continent  Bladder: Did not void (no void since admit)   Bowel: No movement (no BM since admit)  Bladder Function  Urine Void (mL): 200 ml  Number of Times Voided: 1  Urinary Options: Yes  Urine Color: Yellow  Genitourinary Assessment   Bladder Assessment (WDL):  WDL Except  Andres Catheter: Not " Applicable  Urine Color: Yellow  Bladder Device: Bathroom, Diaper  Bladder Scan: Post Void  $ Bladder Scan Results (mL): 142  Bladder Medications: Yes    Skin  Neal Score   16  Sensory Interventions   Bed Types: Standard/Trauma Mattress with Overlay  Skin Preventative Measures: Pillows in Use for Support / Positioning  Moisture Interventions         Pain  Pain Rating Scale  0 - No Pain  Pain Location  Chest, Rib Cage  Pain Location Orientation  Anterior  Pain Interventions   Medication (see MAR) (oxycodone 10 mg)    ADLs    Bathing      Linen Change      Personal Hygiene     Chlorhexidine Bath      Oral Care     Teeth/Dentures     Shave     Nutrition Percentage Eaten  Lunch, Between 25-50% Consumed  Environmental Precautions  Treaded Slipper Socks on Patient, Personal Belongings, Wastebasket, [place] etc. in Easy Reach, Bed in Low Position  Patient Turns/Positioning  Patient Turns Self from Side to Side  Patient Turns Assistance/Tolerance  Assistance of One, Does Not Tolerate  Bed Positions  Bed Controls On, Bed Locked  Head of Bed Elevated  Self regulated      Psychosocial/Neurologic Assessment  Psychosocial Assessment  Psychosocial (WDL):  WDL Except  Patient Behaviors: Confused  Neurologic Assessment  Neuro (WDL): Exceptions to WDL  Level of Consciousness: Alert  Orientation Level: Oriented to place, Oriented to situation, Oriented to person, Disoriented to time  Cognition: Follows commands  Speech: Clear  Pupil Assesment: No  Motor Function/Sensation Assessment: Motor strength, Sensation  RUE Sensation: Tingling, Numbness  Muscle Strength Right Arm: Good Strength Against Gravity and Moderate Resistance  LUE Sensation: Full sensation  Muscle Strength Left Arm: Good Strength Against Gravity and Moderate Resistance  RLE Sensation: Numbness, Tingling  Muscle Strength Right Leg: Good Strength Against Gravity and Moderate Resistance  LLE Sensation: Full sensation  Muscle Strength Left Leg: Good Strength Against  Gravity and Moderate Resistance  EENT (WDL):  WDL Except    Cardio/Pulmonary Assessment  Edema      Respiratory Breath Sounds  RUL Breath Sounds: Clear  RML Breath Sounds: Clear  RLL Breath Sounds: Diminished  EVELINA Breath Sounds: Clear  LLL Breath Sounds: Diminished  Cardiac Assessment   Cardiac (WDL):  WDL Except (hx afib, cardiomyopathy, aicd, cad, stemi, htn, hld, chf)

## 2024-02-15 NOTE — PROGRESS NOTES
"                                                         NURSING DAILY NOTE    Name: Francesco Schulte   Date of Admission: 2/5/2024   Admitting Diagnosis: Left thalamic infarction (HCC)  Attending Physician: Jorgito Starks M.d.  Allergies: Atorvastatin, Doxycycline, Lisinopril, Simvastatin, Statins [hmg-coa-r inhibitors], Beta adrenergic blockers, Eplerenone, Metformin, and Spironolactone    Safety  Patient Assist  min/mod  Patient Precautions  Fall Risk  Precaution Comments  Per daughter report, \"underdiagnosed vascular dementia.\" Poor historian  Bed Transfer Status  Standby Assist  Toilet Transfer Status   Standby Assist  Assistive Devices  Rails, Wheelchair  Oxygen  None - Room Air  Diet/Therapeutic Dining  Current Diet Order   Procedures    Diet Order Diet: Cardiac; Second Modifier: (optional): Consistent CHO (Diabetic)     Pill Administration  floated - double check for pocketing. May need to crush if large  Agitated Behavioral Scale  20  ABS Level of Severity  No Agitation    Fall Risk  Has the patient had a fall this admission?   No  Ghazal Mcelroy Fall Risk Scoring  16, HIGH RISK  Fall Risk Safety Measures  bed alarm and chair alarm    Vitals  Temperature: 36.8 °C (98.2 °F)  Temp src: Oral  Pulse: 70  Respiration: 18  Blood Pressure : 120/70  Blood Pressure MAP (Calculated): 87 MM HG  BP Location: Right, Upper Arm  Patient BP Position: Supine     Oxygen  Pulse Oximetry: 95 %  O2 (LPM): 0  O2 Delivery Device: None - Room Air    Bowel and Bladder  Last Bowel Movement  02/11/24 (per report)  Stool Type  Type 4: Like a sausage or snake, smooth and soft  Bowel Device  Bathroom, Other (Comment) (Bowel Meds)  Continent  Bladder: Did not void (no void since admit)   Bowel: No movement (no BM since admit)  Bladder Function  Urine Void (mL): 200 ml  Number of Times Voided: 1  Urinary Options: Yes  Urine Color: Unable To Evaluate  Genitourinary Assessment   Bladder Assessment (WDL):  WDL Except  Andres " Catheter: Not Applicable  Urine Color: Unable To Evaluate  Bladder Device: Bathroom  Bladder Scan: Post Void  $ Bladder Scan Results (mL): 218  Bladder Medications: Yes    Skin  Neal Score   16  Sensory Interventions   Bed Types: Standard/Trauma Mattress  Skin Preventative Measures: Pillows in Use to Float Heels, Pillows in Use for Support / Positioning  Moisture Interventions         Pain  Pain Rating Scale  3 - Sometimes distracts me  Pain Location  Chest, Rib Cage  Pain Location Orientation  Anterior  Pain Interventions   Medication (see MAR)    ADLs    Bathing      Linen Change      Personal Hygiene     Chlorhexidine Bath      Oral Care     Teeth/Dentures     Shave     Nutrition Percentage Eaten  Lunch, Between 25-50% Consumed  Environmental Precautions  Treaded Slipper Socks on Patient  Patient Turns/Positioning  Patient Turns Self from Side to Side  Patient Turns Assistance/Tolerance  Assistance of One  Bed Positions  Bed Controls On  Head of Bed Elevated  Self regulated      Psychosocial/Neurologic Assessment  Psychosocial Assessment  Psychosocial (WDL):  WDL Except  Patient Behaviors: Confused  Neurologic Assessment  Neuro (WDL): Exceptions to WDL  Level of Consciousness: Alert  Orientation Level: Oriented to place, Oriented to situation, Oriented to person, Disoriented to time  Cognition: Follows commands  Speech: Clear  Pupil Assesment: No  Motor Function/Sensation Assessment: Motor strength, Sensation  RUE Sensation: Tingling, Numbness  Muscle Strength Right Arm: Good Strength Against Gravity and Moderate Resistance  LUE Sensation: Full sensation  Muscle Strength Left Arm: Good Strength Against Gravity and Moderate Resistance  RLE Sensation: Numbness, Tingling  Muscle Strength Right Leg: Good Strength Against Gravity and Moderate Resistance  LLE Sensation: Full sensation  Muscle Strength Left Leg: Good Strength Against Gravity and Moderate Resistance  EENT (WDL):  WDL Except    Cardio/Pulmonary  Assessment  Edema      Respiratory Breath Sounds  RUL Breath Sounds: Clear  RML Breath Sounds: Clear  RLL Breath Sounds: Diminished  EVELINA Breath Sounds: Clear  LLL Breath Sounds: Diminished  Cardiac Assessment   Cardiac (WDL):  WDL Except (hx afib, cardiomyopathy, aicd, cad, stemi, htn, hld, chf)

## 2024-02-15 NOTE — THERAPY
Missed Therapy    Patient Name: Francesco Schulte  Age:  73 y.o., Sex:  male  Medical Record #: [id number]  Today's Date: 2/15/2024    Discussed missed therapy with Dr. Starks via voalte. Discussed with RN.  Therapy on hold due to chest pain. Dr. Starks informed and agreed.       02/15/24 0831   Therapy Missed   Missed Therapy (Minutes) 60   Reason For Missed Therapy Medical - Patient on Hold from Therapy

## 2024-02-15 NOTE — PROGRESS NOTES
Pt complained of chest pain whie in the toilet seat, assisted back to bed, v/s taken BP- 156/97  P- 85, O2 SAT 96 % on room air. Assessment done, Pt stated chest pain pointing on the left upper shoulder, denies radiation of the pain. Pt's o2 desats to 87% , placed on 2 lnc . . Vital signs rechecked at  Reassurance provided, pt calmed down, latest bp- 142/78 p= 78 o2 sat 99 % on 2 lnc.. Will cont monitor. Ot refused meds. Esthela RN Supervisor aware.

## 2024-02-15 NOTE — THERAPY
Missed Therapy    Patient Name: Francesco Schulte  Age:  73 y.o., Sex:  male  Medical Record #: [id number]  Today's Date: 2/15/2024    Discussed missed therapy with therapy team, therapy .  Pt on hold from therapy d/t c/o chest pain, fatigue, SOB.       02/15/24 1431   Therapy Missed   Missed Therapy (Minutes) 30   Reason For Missed Therapy Medical - Patient on Hold from Therapy  (Attempted to see pt, but pt declined d/t chest pain, fatigue and SOB)

## 2024-02-16 NOTE — THERAPY
"Missed Therapy    Patient Name: Francesco Schulte  Age:  73 y.o., Sex:  male  Medical Record #: [id number]  Today's Date: 2/16/2024    Discussed missed therapy with therapy with rehab supervisor.        02/16/24 0901   Therapy Missed   Missed Therapy (Minutes) 60   Reason For Missed Therapy Medical - Patient on Hold from Therapy  (Attempeted to see pt for DC shower. Pt refused with c/o chest pain, nausea, and fatigue.)     Attempted to see pt for DC shower/ADLs. Pt encountered supine in bed, holding chest. Pt refused therapy, stating, \"I won't do anything in my current condition.\"    "

## 2024-02-16 NOTE — DISCHARGE INSTRUCTIONS
[facility] NURSING DISCHARGE INSTRUCTIONS    Blood Pressure : 125/75  Weight: 80.7 kg (178 lb)  Nursing recommendations for Bob Schulte at time of discharge are as follows:  Client verbalized understanding of all discharge instructions and prescriptions.     Review all your home medications and newly ordered medications with your doctor and/or pharmacist. Follow medication instructions as directed by your doctor and/or pharmacist.    Pain Management:   Discharge Pain Medication Instructions:  Comfort Goal: Comfort with Movement, Sleep Comfortably  Notify your primary care provider if pain is unrelieved with these measures, if the pain is new, or increased in intensity.    Discharge Skin Characteristics:    Discharge Skin Exam:    Wound 02/02/24 Partial Thickness Wound Sacrum Bilateral (Active)   Wound Image   02/07/24 1400   Site Assessment [place] 02/14/24 0800   Periwound Assessment Red 02/14/24 0800   Closure Open to air 02/14/24 0800   Drainage Amount None 02/14/24 0800   Treatments Offloading;Site care;Cleansed 02/14/24 0800   Offloading/DME Other (comment) 02/14/24 0800   Wound Cleansing Soap and Water 02/14/24 0800   Periwound Protectant Antifungal Therapy 02/14/24 0800   Dressing Status Open to Air 02/14/24 0800   Dressing Cleansing/Solutions Not Applicable 02/12/24 2101   Dressing Options Open to Air 02/14/24 0800   NEXT Weekly Photo (Inpatient Only) 02/19/24 02/14/24 0800       Wound 02/02/24 Pressure Injury Foot Right SECOND TOE (Active)   Wound Image     02/08/24 1300   Site Assessment Dry;Brown;Crusted 02/14/24 1641   Periwound Assessment Clean;Dry 02/15/24 2003   Margins Attached edges;Defined edges 02/15/24 2003   Closure Open to air 02/15/24 2003   Drainage Amount None 02/14/24 1641   Drainage Description Serous 02/08/24 1300   Treatments Site care 02/14/24 2030   Wound Cleansing Approved Wound Cleanser 02/12/24 2101   Periwound Protectant Not Applicable 02/14/24 1641    Dressing Status Open to Air 02/15/24 2003   Dressing Changed Other (Comment) 02/14/24 1641   Dressing Cleansing/Solutions 3% Betadine 02/14/24 1641   Dressing Options Open to Air 02/14/24 2030   Dressing Change/Treatment Frequency Daily, and As Needed 02/14/24 2030   NEXT Dressing Change/Treatment Date 02/15/24 02/14/24 2030   NEXT Weekly Photo (Inpatient Only) 02/19/24 02/14/24 2030   Wound Team Following Other (comment) 02/08/24 1300   Wound Length (cm) 0.6 cm 02/08/24 1300   Wound Width (cm) 0.8 cm 02/08/24 1300   Wound Surface Area (cm^2) 0.48 cm^2 02/08/24 1300   Wound Bed Eschar (%) 100 % 02/08/24 1300   Wound Odor None 02/08/24 1300   Pulses Not palpable 02/08/24 1300       Wound 02/02/24 Foot Left DTI (Active)   Wound Image     02/05/24 1625   Site Assessment Clean;Dry;Intact 02/14/24 2030   Periwound Assessment Clean;Dry;Intact 02/14/24 2030   Closure Open to air 02/14/24 2030   Drainage Amount None 02/14/24 1641   Treatments Offloading 02/14/24 2030   Wound Cleansing Approved Wound Cleanser 02/12/24 2101   Periwound Protectant Not Applicable 02/12/24 2101   Dressing Status Open to Air 02/14/24 2030       Wound 02/07/24 Diabetic Ulcer Heel Plantar Left (Active)   Wound Image    02/08/24 1300   Site Assessment Clean;Dry;Intact 02/15/24 2003   Periwound Assessment Clean;Dry;Intact 02/14/24 1641   Margins Defined edges 02/14/24 1641   Closure Approximated 02/14/24 1641   Drainage Amount None 02/14/24 1641   Drainage Description Serosanguineous 02/12/24 1700   Treatments Cleansed;Site care 02/14/24 1641   Offloading/DME Heel float boot 02/12/24 2101   Wound Cleansing Approved Wound Cleanser 02/14/24 1641   Periwound Protectant Not Applicable 02/14/24 1641   Dressing Status Clean;Dry;Intact;Open to Air 02/15/24 2003   Dressing Changed New 02/14/24 1641   Dressing Cleansing/Solutions Normal Saline 02/14/24 1641   Dressing Options Hydrofera Blue Ready;Offloading Dressing - Sacral 02/14/24 1641   Dressing  Change/Treatment Frequency Every 72 hrs, and As Needed 02/14/24 1641   NEXT Dressing Change/Treatment Date 02/17/24 02/14/24 1641   NEXT Weekly Photo (Inpatient Only) 02/21/24 02/14/24 1641   Wound Team Following Other (comment) 02/08/24 1300   Wound Odor None 02/08/24 1300   Pulses DP;1+;Left 02/08/24 1300   WOUND NURSE ONLY - Time Spent with Patient (mins) 60 02/08/24 1300     Skin / Wound Care Instructions: Please contact your primary care physician for any change in skin integrity.     If You Have Surgical Incisions / Wounds:  Monitor surgical site(s) for signs of increased swelling, redness or symptoms of drainage from the site or fever as this could indicate signs and symptoms of infection. If these symptoms are noted, notifiy your primary care provider.      Discharge Safety Instructions: Ensure pt's safety, compliant to clinic appt.     Discharge Safety Concerns:  Pt  impulsive.  The interdisciplinary team has made recommendation that you should not be left alone  in the house due to balance problem, impaired judgment, history of falls, weakness, and unsteady gait  Anti-embolic stockings are not required to increase circulation to the lower extremities.    Discharge Diet: Cardiac Diabetic diet      Discharge Liquids:  Thin  Discharge Bowel Function: continent / incontinent   Please contact your primary care physician for any changes in bowel habits.  Discharge Bowel Program:  Incontinent   Discharge Bladder Function:  Incontinent  Discharge Urinary Devices:        Nursing Discharge Plan: Free from fall and injury.       Case Management Discharge Instructions:   Discharge Location:    Agency Name/Address/Phone:    Home Health:    Outpatient Services:    DME Provider/Phone:    Medical Equipment Ordered:    Prescription Faxed to:        Discharge Medication Instructions:  Below are the medications your physician expects you to take upon discharge:    Speech Therapy Discharge Instructions for Bob York  Eris    2/16/2024    Supervision: 24/7 supervision for safety  Pt has demonstrated progress while at [facility]. Pt requires supervision and assistance with meds and finances. Pt with severe memory deficits impacting carryover and safety. Pt motivated to remain indep however is impacted by decreased insight and awareness related to current deficits and impact on daily tasks and activities. Pt to dc to SNF, ongoing cognitive intervention and use of external memory aids to assist in orientation and recall recommended.     What is memory?   Memory is the ability to learn, store, and retrieve information. New or increasing problems with any or all of these 3 stages of memory often occur after a traumatic brain injury, stroke, brain tumor, multiple sclerosis, or other kind of injury or illness that affects your nervous system.   Some kinds of memory problems may also occur as part of normal aging, when many people have more trouble retrieving new information.     Types of Memory:    Long-term (remote): memory for old, well-learned information that has been “rehearsed” (used) over time, such as the name of a childhood pet, memories of past vacations, or where you went to high school. Long-term memory tends to be retained after injury or illness.   Short-term (recent): memory for new experiences that took place a few minutes, hours, or days ago, such as what you had for breakfast or what you did yesterday. Short-term memory tends to be the most severely affected after injury. People who have had brain injuries may have problems with their attention span, how much memory they can store, how quickly they can think, and how efficiently they learn. These memory problems will make it hard to understand and save short-term memories so that they can be correctly rehearsed and stored in long-term memory.   Immediate (working): memory for information that is current, that you usually keep track of mentally, such as a phone number you  look up, directions someone just gave you, or keeping track of numbers in your head when you add or subtract.   Prospective: the ability to remember to do something in the future, such as remembering to take a medicine, go to an appointment, or follow through on an assignment or project.       Strategies to Help Improve Your Memory   Your speech therapist can work with you to develop strategies to help you remember new information. There are 2 main types of strategies to help your memory: internal reminders and external reminders.     Internal Reminders     Rehearsal: retelling yourself information you just learned, or restating it out loud in your own words.   Repetition: saying the same information over and over, either silently or out loud.   Clarification: asking someone else to repeat or rephrase information.   Chunking: grouping items together to reduce the number of items to remember, such as grouping 7-digit phone numbers into 2 chunks, one with 3 numbers and the other with 4 numbers.   Rhyming: making a rhyme out of important information.   Acronyms or alphabet cueing: creating a letter for each word you want to remember, or vice versa. One example is using the sentence “Every Good Boy Does Fine” to remember that the lines of a treble staff in music are the notes E, G, B, D, and F.   Imagery (also called visualization): creating pictures of the information in your mind.   Association: linking old information or habits with the new, such as remembering to take your medicine every night at the same time that you brush your teeth.   Personal meaning: making the new information meaningful or emotionally important to you in some way.     External Reminders     Using a paper or electronic calendar or day planner.   Setting timers or alarms to remind you to do something.   Writing down reminders such as to-do lists, shopping lists, and project outlines.   Recording new information with a voice recorder.   Using a  medicine organizing tool.   Creating specific, permanent places for important items. One example is always putting your keys, wallet, and cell phone in the same place every time you get home.

## 2024-02-16 NOTE — CARE PLAN
Problem: Pain - Standard  Goal: Alleviation of pain or a reduction in pain to the patient’s comfort goal  Outcome: Progressing  Medicated with Roxicodone 10 mg po for pain with moderate relief,     Problem: Fall Risk - Rehab  Goal: Patient will remain free from falls  Outcome: Progressing  Patient asisted with needs/transers to prevent falls.   The patient is Stable - Low risk of patient condition declining or worsening    Shift Goals  Clinical Goals: Safety  Patient Goals: Sleep well    Progress made toward(s) clinical / shift goals:      Patient is not progressing towards the following goals:

## 2024-02-16 NOTE — PROGRESS NOTES
Discharged to [facility] via wheelchair by their transport.  Report given to admitting nurse.

## 2024-02-16 NOTE — CARE PLAN
Problem: Speech/Swallowing LTGs  Goal: LTG-By discharge, patient will complete functional problem solving and recall information with 80% accuracy provided MOD I.  Outcome: Discharged - Not Met     Problem: Problem Solving STGs  Goal: STG-Within one week, patient will complete functional medication and financial management tasks with 80% accuracy with MIN cues.  Outcome: Discharged - Not Met     Problem: Memory STGs  Goal: STG-Within one week, patient will implement functional memory strategies with 80% accuracy provided MIN cues.  Outcome: Discharged - Not Met

## 2024-02-16 NOTE — PROGRESS NOTES
Hospital Medicine Daily Progress Note        Chief Complaint:  Hypertension  Diabetes    Interval History:  Pt denies new complaints.  Labs reviewed.  UA 10-20 WBC w/ negative bacteria, UCx pending per Dr. Starks.    Review of Systems  Review of Systems   Constitutional:  Negative for chills and fever.   HENT: Negative.     Eyes: Negative.    Respiratory:  Negative for cough and shortness of breath.    Cardiovascular:  Negative for chest pain and palpitations.   Gastrointestinal:  Negative for abdominal pain, nausea and vomiting.   Musculoskeletal: Negative.    Skin:  Negative for itching and rash.   Endo/Heme/Allergies:  Negative for polydipsia. Does not bruise/bleed easily.        Physical Exam  Temp:  [36.5 °C (97.7 °F)-36.8 °C (98.2 °F)] 36.5 °C (97.7 °F)  Pulse:  [67-72] 67  Resp:  [17-18] 18  BP: (125-144)/(73-80) 125/73  SpO2:  [93 %-95 %] 95 %    Physical Exam  Vitals reviewed.   Constitutional:       General: He is not in acute distress.     Appearance: Normal appearance. He is not ill-appearing.   HENT:      Head: Normocephalic and atraumatic.      Right Ear: External ear normal.      Left Ear: External ear normal.      Nose: Nose normal.      Mouth/Throat:      Pharynx: Oropharynx is clear.   Eyes:      General:         Right eye: No discharge.         Left eye: No discharge.      Extraocular Movements: Extraocular movements intact.      Conjunctiva/sclera: Conjunctivae normal.   Cardiovascular:      Rate and Rhythm: Normal rate and regular rhythm.   Pulmonary:      Effort: No respiratory distress.      Breath sounds: No wheezing.      Comments: Decreased BS  Abdominal:      General: Bowel sounds are normal. There is no distension.      Palpations: Abdomen is soft.      Tenderness: There is no abdominal tenderness.   Musculoskeletal:      Cervical back: Normal range of motion and neck supple.      Right lower leg: No edema.      Left lower leg: No edema.   Skin:     General: Skin is warm and dry.    Neurological:      Mental Status: He is alert.      Comments: Awake and alert         Fluids    Intake/Output Summary (Last 24 hours) at 2/16/2024 0933  Last data filed at 2/16/2024 0800  Gross per 24 hour   Intake 200 ml   Output 1400 ml   Net -1200 ml       Laboratory  Recent Labs     02/16/24  0533   WBC 8.1   RBC 6.20*   HEMOGLOBIN 15.7   HEMATOCRIT 49.4   MCV 79.7*   MCH 25.3*   MCHC 31.8*   RDW 65.5*   PLATELETCT 528*   MPV 10.2     Recent Labs     02/14/24  0525 02/16/24  0533   SODIUM 135 135   POTASSIUM 3.9 3.8   CHLORIDE 103 103   CO2 21 23   GLUCOSE 149* 99   BUN 23* 29*   CREATININE 1.07 1.16   CALCIUM 9.1 9.1                 Assessment/Plan  * Left thalamic infarction (HCC)- (present on admission)  Assessment & Plan  On ASA and Eliquis  Ongoing management per Physiatry    Azotemia  Assessment & Plan  Encourage PO fluids, goal 2 L/day  Follow renal function    Osteomyelitis of second toe of right foot (HCC)  Assessment & Plan  Physiatry coordinating care    Dementia (HCC)  Assessment & Plan  History noted    Paroxysmal A-fib (HCC)  Assessment & Plan  Echo EF 20%, mod AI, RVSP 53 mmHg  H/O AICD  On no rate controlling agents  Monitor for tachydysrhythmias  Anticoagulated on Eliquis  BNP improving    Vitamin D insufficiency  Assessment & Plan  Vit D level 27  On supplementation    Chest pain- (present on admission)  Assessment & Plan  Has had ongoing recurrent symptoms w/ repeatedly negative work up    Essential hypertension- (present on admission)  Assessment & Plan  Blood pressure controlled on Norvasc  Monitor for orthostatic hypotension on Flomax and Proscar    Type 2 diabetes mellitus with hyperglycemia (HCC)- (present on admission)  Assessment & Plan  HbA1c 5.9  Serum glucose controlled on Lantus  Continue SSI while inpt  Outpt meds include Tresiba 10 u qd    Thrombocytosis- (present on admission)  Assessment & Plan  Likely reactive  Follow PLT       Full Code

## 2024-02-16 NOTE — PROGRESS NOTES
"                                                         NURSING DAILY NOTE    Name: Francesco Schulte   Date of Admission: 2/5/2024   Admitting Diagnosis: Left thalamic infarction (HCC)  Attending Physician: Jorgito Starks M.d.  Allergies: Atorvastatin, Doxycycline, Lisinopril, Simvastatin, Statins [hmg-coa-r inhibitors], Beta adrenergic blockers, Eplerenone, Metformin, and Spironolactone    Safety  Patient Assist  min/mod  Patient Precautions  Fall Risk  Precaution Comments  Per daughter report, \"underdiagnosed vascular dementia.\" Poor historian  Bed Transfer Status  Standby Assist  Toilet Transfer Status   Standby Assist  Assistive Devices  Rails, Wheelchair  Oxygen  None - Room Air  Diet/Therapeutic Dining  Current Diet Order   Procedures    Diet Order Diet: Cardiac; Second Modifier: (optional): Consistent CHO (Diabetic)     Pill Administration  whole and one at a time   Agitated Behavioral Scale  17  ABS Level of Severity  No Agitation    Fall Risk  Has the patient had a fall this admission?   No  Ghazal Mcelroy Fall Risk Scoring  16, HIGH RISK  Fall Risk Safety Measures  bed alarm, chair alarm, seatbelt alarm, and poor balance    Vitals  Temperature: 36.8 °C (98.2 °F)  Temp src: Oral  Pulse: 70  Respiration: 17  Blood Pressure : 125/75  Blood Pressure MAP (Calculated): 92 MM HG  BP Location: Left, Upper Arm  Patient BP Position: Sitting     Oxygen  Pulse Oximetry: 94 %  O2 (LPM): 2  O2 Delivery Device: None - Room Air    Bowel and Bladder  Last Bowel Movement  02/14/24  Stool Type  Type 4: Like a sausage or snake, smooth and soft  Bowel Device  Bathroom, Other (Comment) (Bowel Meds)  Continent  Bladder: Did not void (no void since admit)   Bowel: No movement (no BM since admit)  Bladder Function  Urine Void (mL): 150 ml  Number of Times Voided: 1  Urinary Options: Yes  Urine Color: Adrienne  Genitourinary Assessment   Bladder Assessment (WDL):  WDL Except  Andres Catheter: Not Applicable  Urine " Color: Adrienne  Bladder Device: Bathroom, Diaper  Bladder Scan: Post Void  $ Bladder Scan Results (mL): 142  Bladder Medications: Yes    Skin  Neal Score   16  Sensory Interventions   Bed Types: Standard/Trauma Mattress with Overlay  Skin Preventative Measures: Pillows in Use for Support / Positioning  Moisture Interventions  Moisturizers/Barriers: Barrier Cream, Barrier Paste      Pain  Pain Rating Scale  0 - No Pain  Pain Location  Rib Cage  Pain Location Orientation  Anterior  Pain Interventions   Medication (see MAR)    ADLs    Bathing      Linen Change      Personal Hygiene     Chlorhexidine Bath      Oral Care     Teeth/Dentures     Shave     Nutrition Percentage Eaten  Lunch, Less than 25% Consumed  Environmental Precautions  Treaded Slipper Socks on Patient, Personal Belongings, Wastebasket, Call Bell etc. in Easy Reach, Bed in Low Position  Patient Turns/Positioning  Patient Turns Self from Side to Side  Patient Turns Assistance/Tolerance  Assistance of One  Bed Positions  Bed Controls On, Bed Locked  Head of Bed Elevated  Self regulated      Psychosocial/Neurologic Assessment  Psychosocial Assessment  Psychosocial (WDL):  WDL Except  Patient Behaviors: Confused  Neurologic Assessment  Neuro (WDL): Exceptions to WDL  Level of Consciousness: Alert  Orientation Level: Oriented to place, Oriented to situation, Oriented to person, Disoriented to time  Cognition: Follows commands  Speech: Clear  Pupil Assesment: No  Motor Function/Sensation Assessment: Motor strength, Sensation  RUE Sensation: Tingling, Numbness  Muscle Strength Right Arm: Good Strength Against Gravity and Moderate Resistance  LUE Sensation: Full sensation  Muscle Strength Left Arm: Good Strength Against Gravity and Moderate Resistance  RLE Sensation: Numbness, Tingling  Muscle Strength Right Leg: Good Strength Against Gravity and Moderate Resistance  LLE Sensation: Full sensation  Muscle Strength Left Leg: Good Strength Against Gravity and  Moderate Resistance  EENT (WDL):  WDL Except    Cardio/Pulmonary Assessment  Edema      Respiratory Breath Sounds  RUL Breath Sounds: Clear  RML Breath Sounds: Clear  RLL Breath Sounds: Diminished  EVELINA Breath Sounds: Diminished  LLL Breath Sounds: Diminished  Cardiac Assessment   Cardiac (WDL):  WDL Except (hx afib, cardiomyopathy, aicd, cad, stemi, htn, hld, chf)

## 2024-02-16 NOTE — PROGRESS NOTES
PALLIATIVE CARE SOCIAL WORK CONSULT NOTE    Patient: Bob Schulte  Age: 73  Gender: Male  MRN: [id number]  Insurance: Medicare  Date Admitted: 2/5/24  Date of Service: 2/15/24  Medical History: diabetes, HTN, a-fib, CHF, HDL, lupus, polycythemia vera    History of Present illness: Patient presented to the ER on 2/2 with right sided weakness. He was found to have right occipital CVA. He was transferred to [facility] for therapy. Initially, he improved rapidly but has had increased confusion. Palliative care was consulted for ACP.    LMSW met with wife, Alysa, daughters(Aretha and Charla-via phone). Patient has been increasingly confused and did not participate in discussion. LMSW introduced self and explained role of palliative care. Family was agreeable to discuss patient's healthcare wishes/advance care planning. Prior to hospitalization, patient was living with his wife. He is retired and she still works. Family noticed a cognitive decline about 2 years ago but patient does not have a formal diagnosis of dementia. He was ambulating and able to be alone for small amounts of time. He was sleeping a lot and reported SOB frequently. Family reports patient has been very independent and likes to feel in control. He does not have a diagnosis of anxiety but they feel he is scared of losing control of his life/his faculties and of dying. He is not a person to discuss his feelings. He has been sick for a long time. Initially when patient came to rehab, he was able to participate and engage in therapies and has improved physically but the last few days, patient appears more confused/paranoid and reports chest pain with SOB with no apparent cause. He has not been eating much nor able to participate in therapies. The plan is for him to transition to SNF([facility]) for less intense therapy on 2/16. He is pending an appointment to see if patient is appropriate for amputation of right foot due to osteomyelitis. Family  would like to see if patient can improve and would like to explore options for his foot before making any medical decisions/change. Charla reports, as a family, they want patient to have a sense of dignity and to be peaceful for the rest of his days and they want quality over quantity. Aretha feels patient is somewhat suffering already. Family has a good understanding of prognosis and want what is best for patient. They have has some conversations in the past and family feels patient would not want to be intubated nor having a feeding tube. Patient is currently not eating or drinking much. We explored option of hospice when family feels it is time and how to transition to hospice. We went over higher level of care options versus private pay caregivers.     Patient is not a  and family does not have long term care insurance. We briefly discussed the waiver program through the state. Patient is Religious and family declined need for  services at this time. [facility] is a  and patient will be leaving facility tomorrow.     Patient has a completed advance directive on file. His wife, Alysa, is his DPOA. We discussed his current code status: DNAR/DNI. Family would like to continue with that. We discussed/completed POLST. Wife chose DNR with selective treatment and no feeding tube. Provider was not available for signature. Case management will assist with POLST completion and provide original to patient and obtain copy for medical record.     LMSW met with patient. He reports chest pain but was not able to articulate what level his pain is at.     LMSW to follow patient/family as needed.    Ernestina Gutierrez LMSW  Palliative Care

## 2024-02-16 NOTE — CARE PLAN
"  Problem: Pain - Standard  Goal: Alleviation of pain or a reduction in pain to the patient’s comfort goal  Outcome: Progressing  Note: Assessed for pain and discomfort , pain under control, needs anticipated and attended.     Problem: Fall Risk - Rehab  Goal: Patient will remain free from falls  Outcome: Progressing  Note: Ghazal Mcelroy Fall risk Assessment Score: 16    High fall risk Interventions   - Alarming seatbelt  - Bed and strip alarm   - Yellow sign by the door   - Yellow wrist band \"Fall risk\"  - Room near to the nurse station  - Do not leave patient unattended in the bathroom  - Fall risk education provided      The patient is Stable - Low risk of patient condition declining or worsening    Shift Goals  Clinical Goals: Safety  Patient Goals: Sleep well    Progress made toward(s) clinical / shift goals:  Pt free from fall and injury.      "

## 2024-02-16 NOTE — SENIOR ADMIT NOTE
"                                                         NURSING DAILY NOTE    Name: Francesco Schulte   Date of Admission: 2/5/2024   Admitting Diagnosis: Left thalamic infarction (HCC)  Attending Physician: Jorgito Starks M.d.  Allergies: Atorvastatin, Doxycycline, Lisinopril, Simvastatin, Statins [hmg-coa-r inhibitors], Beta adrenergic blockers, Eplerenone, Metformin, and Spironolactone    Safety  Patient Assist  min/mod  Patient Precautions  Fall Risk  Precaution Comments  Per daughter report, \"underdiagnosed vascular dementia.\" Poor historian  Bed Transfer Status  Standby Assist  Toilet Transfer Status   Standby Assist  Assistive Devices  Rails, Wheelchair  Oxygen  None - Room Air  Diet/Therapeutic Dining  Current Diet Order   Procedures    Diet Order Diet: Cardiac; Second Modifier: (optional): Consistent CHO (Diabetic)     Pill Administration  whole  Agitated Behavioral Scale  17  ABS Level of Severity  No Agitation    Fall Risk  Has the patient had a fall this admission?   No  Ghazal Mcelroy Fall Risk Scoring  16, HIGH RISK  Fall Risk Safety Measures  bed alarm, chair alarm, and seatbelt alarm    Vitals  Temperature: 36.5 °C (97.7 °F)  Temp src: Oral  Pulse: 67  Respiration: 18  Blood Pressure : 125/73  Blood Pressure MAP (Calculated): 90 MM HG  BP Location: Right, Upper Arm  Patient BP Position: Cornell's Position     Oxygen  Pulse Oximetry: 95 %  O2 (LPM): 2  O2 Delivery Device: None - Room Air    Bowel and Bladder  Last Bowel Movement  02/14/24  Stool Type  Type 4: Like a sausage or snake, smooth and soft  Bowel Device  Bathroom, Other (Comment) (Bowel Meds)  Continent  Bladder: Did not void (no void since admit)   Bowel: No movement (no BM since admit)  Bladder Function  Urine Void (mL): 300 ml  Number of Times Voided: 2  Urinary Options: Yes  Urine Color: Adrienne  Genitourinary Assessment   Bladder Assessment (WDL):  WDL Except  Andres Catheter: Not Applicable  Urine Color: Adrienne  Bladder " Device: Urinal  Bladder Scan: Post Void  $ Bladder Scan Results (mL): 142  Bladder Medications: Yes    Skin  Neal Score   16  Sensory Interventions   Bed Types: Standard/Trauma Mattress  Skin Preventative Measures: Pillows in Use for Support / Positioning  Moisture Interventions  Moisturizers/Barriers: Barrier Cream, Barrier Wipes      Pain  Pain Rating Scale  6 - Hard to ignore, avoid usual activities  Pain Location  Rib Cage  Pain Location Orientation  Anterior  Pain Interventions   Medication (see MAR)    ADLs    Bathing      Linen Change      Personal Hygiene     Chlorhexidine Bath      Oral Care     Teeth/Dentures     Shave     Nutrition Percentage Eaten  Between 25-50% Consumed, Breakfast  Environmental Precautions  Treaded Slipper Socks on Patient  Patient Turns/Positioning  Patient Turns Self from Side to Side  Patient Turns Assistance/Tolerance  Assistance of One  Bed Positions  Bed Controls On  Head of Bed Elevated  Self regulated      Psychosocial/Neurologic Assessment  Psychosocial Assessment  Psychosocial (WDL):  WDL Except  Patient Behaviors: Confused  Neurologic Assessment  Neuro (WDL): Exceptions to WDL  Level of Consciousness: Alert  Orientation Level: Oriented to place, Oriented to situation  Cognition: Follows commands  Speech: Clear  Pupil Assesment: No  Motor Function/Sensation Assessment: Motor strength, Sensation  RUE Sensation: Tingling, No sensation  Muscle Strength Right Arm: Good Strength Against Gravity and Moderate Resistance  LUE Sensation: Full sensation  Muscle Strength Left Arm: Good Strength Against Gravity and Moderate Resistance  RLE Sensation: Numbness, Tingling  Muscle Strength Right Leg: Good Strength Against Gravity and Moderate Resistance  LLE Sensation: Full sensation  Muscle Strength Left Leg: Good Strength Against Gravity and Moderate Resistance  EENT (WDL):  WDL Except    Cardio/Pulmonary Assessment  Edema      Respiratory Breath Sounds  RUL Breath Sounds: Clear  RML  Breath Sounds: Clear  RLL Breath Sounds: Diminished  EVELINA Breath Sounds: Diminished  LLL Breath Sounds: Diminished  Cardiac Assessment   Cardiac (WDL):  WDL Except (hx-afib, cardiomyopapthy, CAD,stemi, hld, chf)

## 2024-02-22 NOTE — PROGRESS NOTES
Cardiology Follow-up Consultation Note    Date of note:    2/22/2024    Primary Care Provider: Tala Dunne M.D.    Patient Name: Francesco Schulte   YOB: 1950  MRN:              [id number]    Chief Complaint: Follow up HFrEF    History of Present Illness: Mr. Francesco Schulte is a 73 y.o. male whose current medical problems include CAD status post multiple PCI to LAD LCx, POBA to diagonal, HFrEF due to ischemic cardiomyopathy, s/p ICD (extracted due to infection, now s/p S-ICD), PAF, HTN, and CVA who is here for follow-up HFrEF.     The patient previously saw  and Dr. Isaac as well as [facility].  GDMT was previously not optimized due to orthostatic hypotension as well as intolerance.  The patient was recently hospitalized 2/2024 for possible stroke due to hypertensive emergency.    He presents today for follow-up.  The patient presents with staff from acute rehab facility.  The patient reports feeling well currently.  He denies any chest pain or shortness of breath on exertion although does not exert much.  No orthopnea, PND, or leg swelling.  No palpitations.  No syncope or presyncopal episodes.      Cardiovascular Risk Factors:  1. Smoking status: Never smoker  2. Type II Diabetes Mellitus: Prediabetes, diet controlled   Lab Results   Component Value Date/Time    HBA1C 5.9 (H) 02/06/2024 06:39 AM    HBA1C 5.9 (H) 02/02/2024 06:50 PM     3. Hypertension: On medication,  4. Dyslipidemia: Statin intolerance   Cholesterol,Tot   Date Value Ref Range Status   08/02/2023 155 100 - 199 mg/dL Final     LDL   Date Value Ref Range Status   08/02/2023 97 <100 mg/dL Final     HDL   Date Value Ref Range Status   08/02/2023 43 >=40 mg/dL Final     Triglycerides   Date Value Ref Range Status   08/02/2023 77 0 - 149 mg/dL Final     5. Family history of early Coronary Artery Disease in a first degree relative (Male less than 55 years of age; Female less than  65 years of age): None  6.  Obesity and/or Metabolic Syndrome: Body mass index is 22.85 kg/m².  7. Sedentary lifestyle: Sedentary    Review of Systems   Constitutional: Negative for fever, malaise/fatigue and night sweats.   Cardiovascular:  Negative for chest pain, dyspnea on exertion, irregular heartbeat, leg swelling, near-syncope, orthopnea, palpitations, paroxysmal nocturnal dyspnea and syncope.   Respiratory:  Negative for cough, shortness of breath and wheezing.    Gastrointestinal:  Negative for abdominal pain, diarrhea, nausea and vomiting.   Neurological:  Negative for dizziness, focal weakness and weakness.       All other systems reviewed and are negative.     Current Outpatient Medications   Medication Sig Dispense Refill    omeprazole (PRILOSEC) 20 MG Tablet Delayed Response delayed-release tablet Take  by mouth every day.      oxyCODONE immediate release (ROXICODONE) 10 MG immediate release tablet Take 10 mg by mouth every four hours as needed for Moderate Pain.      senna-docusate (SB DOCUSATE SODIUM/SENNA) 8.6-50 MG Tab Take 1 Tablet by mouth every day.      acetaminophen (TYLENOL) 325 MG Tab Take 650 mg by mouth every four hours as needed.      carvedilol (COREG) 3.125 MG Tab Take 1 Tablet by mouth 2 times a day with meals. 180 Tablet 3    aspirin 81 MG EC tablet Take 1 Tablet by mouth every evening.      insulin glargine (LANTUS SOLOSTAR) 100 UNIT/ML Solution Pen-injector injection Inject 12 Units under the skin every day.      insulin lispro 100 UNIT/ML SC SOPN injection PEN Inject 2-12 Units under the skin 4 Times a Day,Before Meals and at Bedtime.      apixaban (ELIQUIS) 5mg Tab Take 1 Tablet by mouth 2 times a day. 180 Tablet 3    finasteride (PROSCAR) 5 MG Tab TAKE 1 TABLET BY MOUTH EVERY DAY 90 Tablet 1    tamsulosin (FLOMAX) 0.4 MG capsule Take 1 Capsule by mouth every day. 90 Capsule 1    dicyclomine (BENTYL) 10 MG Cap TAKE 1 CAPSULE BY MOUTH EVERY 6 HOURS AS NEEDED (ABDOMINAL CRAMPS). 360  "Capsule 3    vitamin D3 (CHOLECALCIFEROL) 1000 UNIT Tab Take 1 Tablet by mouth every day. (Patient not taking: Reported on 2024)      hydroxyurea (HYDREA) 500 MG Cap Take 500 mg by mouth every morning. (Patient not taking: Reported on 2024)       No current facility-administered medications for this visit.         Allergies   Allergen Reactions    Atorvastatin      States he  when he took it and required life saving measures    Doxycycline      Swelling lips and difficulties swallowing    Lisinopril Anaphylaxis     Pt coded    Simvastatin      States he  while taking a statin and required life saving measures    Statins [Hmg-Coa-R Inhibitors]      States he  while taking a statin and required life saving measures    Beta Adrenergic Blockers Unspecified     dizziness    Eplerenone Unspecified     Intolerance, dehydration, altered mental status    Metformin Palpitations and Unspecified     Cardiac effects  \"Similar to a heart attack, I was hospitalized\"    Spironolactone Palpitations       Physical Exam:  Ambulatory Vitals  /68 (BP Location: Left arm, Patient Position: Sitting, BP Cuff Size: Adult)   Pulse 81   Resp 14   Ht 1.88 m (6' 2\")   SpO2 95%    Oxygen Therapy:  Pulse Oximetry: 95 %  BP Readings from Last 4 Encounters:   24 100/68   24 128/73   24 (!) 144/89   24 120/86       Weight/BMI: Body mass index is 22.85 kg/m².  Wt Readings from Last 4 Encounters:   24 80.7 kg (178 lb)   24 88.8 kg (195 lb 12.3 oz)   24 84.4 kg (186 lb)   23 84.4 kg (186 lb)         General: No apparent distress. Easily distracted  Eyes: nl conjunctiva, no icteric sclera  ENT: normal external appearance of ears, nose, and throat  Neck: no visible JVP,  no carotid bruits  Lungs: normal respiratory effort, CTAB  Heart: RRR, no murmurs, no rubs or gallops,  no edema bilateral lower extremities. No LV/RV heave on cardiac palpatation. + bilateral radial pulses.  " + bilateral dp pulses.   Abdomen: soft, non tender, non distended, no masses, normal bowel sounds.  No HSM.  Extremities/MSK: no clubbing, no cyanosis  Psychiatric: Easily distracted  Skin: Warm extremities      Lab Data Review:  Lab Results   Component Value Date/Time    CHOLSTRLTOT 155 08/02/2023 09:52 AM    LDL 97 08/02/2023 09:52 AM    HDL 43 08/02/2023 09:52 AM    TRIGLYCERIDE 77 08/02/2023 09:52 AM       Lab Results   Component Value Date/Time    SODIUM 135 02/16/2024 05:33 AM    POTASSIUM 3.8 02/16/2024 05:33 AM    CHLORIDE 103 02/16/2024 05:33 AM    CO2 23 02/16/2024 05:33 AM    GLUCOSE 99 02/16/2024 05:33 AM    BUN 29 (H) 02/16/2024 05:33 AM    CREATININE 1.16 02/16/2024 05:33 AM     Lab Results   Component Value Date/Time    ALKPHOSPHAT 73 02/06/2024 06:39 AM    ASTSGOT 12 02/06/2024 06:39 AM    ALTSGPT 11 02/06/2024 06:39 AM    TBILIRUBIN 0.6 02/06/2024 06:39 AM      Lab Results   Component Value Date/Time    WBC 8.1 02/16/2024 05:33 AM    HEMOGLOBIN 15.7 02/16/2024 05:33 AM     Lab Results   Component Value Date/Time    HBA1C 5.9 (H) 02/06/2024 06:39 AM    HBA1C 5.9 (H) 02/02/2024 06:50 PM         Cardiac Imaging and Procedures Review:    EKG dated 2/7/2024: My personal interpretation is Sinus rhythm, Prolonged DC interval  Left atrial enlargement, LVH with secondary repolarization abnormality, old anteroseptal infarct.     Echo dated 1/25/2024:   CONCLUSIONS  Compared to the images of the prior study, there has been no   significant change.   Severely reduced left ventricular systolic function.  The left ventricular ejection fraction is visually estimated to be 20%   to 25%.  Moderate aortic insufficiency.  Right ventricular systolic pressure is estimated to be 53  mmHg   consistent with pulmonary hypertension.    [facility] 3/25/2017  FINDINGS:  HEMODYNAMICS:  LEFT HEART PRESSURES:  1.  LVEDP of 23.  2.  Left ventricular systolic pressure 95.  3.  Central aortic pressure systolic, 94, diastolic 52, mean of  69.     LEFT VENTRICULOGRAPHY:  Left ventricular chamber size is normal.  There is   hypokinesis of the distal anterior wall, anterior apex and inferior apex.    Calculated ejection fraction is 42%.  There was mild dyskinesis of the apex.    No identifiable intracardiac thrombus.     CORONARY ARTERIOGRAPHY:  1.  Right coronary artery:  The RCA is a large caliber vessel, gives rise to   an acute marginal branch, posterior descending artery, and a large   posterolateral system.  The right coronary artery is widely patent with   diffuse mild smooth diffuse atheroma.  2.  Left main artery:  Left main artery is a moderate caliber vessel that is   widely patent with mild diffuse smooth atheroma.  The left main artery   trifurcates into the left anterior descending artery, a ramus intermedius   vessel and the circumflex artery.  3.  Left anterior descending artery:  The LAD gives rise to a normal   complement of septal branches and a diagonal branch and extends around the   apex.  Proximal LAD demonstrates a long stented segment.  The LAD stent has a   focal 99% stenosis with filling defect indictative of thrombus and the distal   portion of the stent has a 60% stenosis.  Distal LAD has a long severely   diffuse diseased segment with up to 95% stenosis.  The diagonal branch,   which is jailed by the previous stent has an ostial irregular 60% stenosis.    The terminal portion of the LAD has severe diffuse disease of up to 80-90%.   There is PHIL 2 antegrade flow into the distal LAD.  4.  Circumflex artery:  The circumflex consists of a single moderate-sized   caliber marginal branch.  Circumflex ostium has a 60% stenosis.  5.  Ramus intermedius:  The ramus is a small caliber vessel.  The ramus ostium   has an 80% stenosis with diffuse significant proximal disease.     POST-STENT IMPLANTATION of the mid left anterior descending artery with a   2.5x38 mm drug-eluting Xience stent post-dilated to 3.0 mm demonstrates good    stent expansion, with residual narrowing of less than 10% with no evidence of   dissection or thrombus.     POST-STENT IMPLANTATION of the distal LAD stent with a 4.0x8 mm   drug-eluting Xience stent demonstrates good stent expansion, 0% residual   stenosis, no evidence of dissection or thrombus with restoration of PHIL 3   antegrade flow.       CONCLUSION:  1.  EF of 42% with anterior apical hypokinesis and dyskinesis.  2.  Proximal LAD subacute stent thrombosis.  3.  Mid LAD 3.0x38 mm OSWALD #4, proximal LAD, in-stent restenosis with a 4.0x8   mm drug-eluting stent.    Cardiac MRI 3/23/2022  IMPRESSIONS:     1. No mass seen in the right atrium. No mass or vegetation adjacent to the tricuspid valve.     2. Severely dilated left ventricle with global hypokinesis and severely reduced left ejection fraction of 26.2%.     3. Extensive transmural infarct throughout the anterior and septal wall from mid portion to the apex. Additional transmural infarct in the mid to basal lateral wall.     4. Questionable aortic dissection seen in the distal aortic arch (image 238 series 12). Further evaluation with CTA is recommended.     5. Trace pericardial effusion.    Assessment & Plan     1. Coronary artery disease due to lipid rich plaque  carvedilol (COREG) 3.125 MG Tab      2. History of heart artery stent  carvedilol (COREG) 3.125 MG Tab      3. Statin intolerance        4. HFrEF (heart failure with reduced ejection fraction) (formerly [facility])  carvedilol (COREG) 3.125 MG Tab      5. Ischemic cardiomyopathy  carvedilol (COREG) 3.125 MG Tab      6. CHF (NYHA class II, ACC/AHA stage C) (formerly [facility])  carvedilol (COREG) 3.125 MG Tab      7. Paroxysmal A-fib (formerly [facility])        8. Hypercoagulable state due to paroxysmal atrial fibrillation (formerly [facility])        9. ICD (implantable cardioverter-defibrillator) in place              Shared Medical Decision Making:    CAD  Status post PCI to LAD, LCx, POBA to diagonal  Statin intolerance  Asymptomatic  -Continue  aspirin  -Previously didn't tolerate MRA, BB, or ACEI according to [facility] but there was discussion of trialing on BB.   -Start carvedilol 3.125 mg twice daily. Stop amlodipine.   -The patient has statin intolerance.  Consider starting ezetimibe next visit as well as referral to lipid clinic if he is agreeable.    HFrEF  Ischemic cardiomyopathy  NYHA Class III Stage C Heart Failure   S/p ICD (now s-ICD)  Euvolemic on exam  -Stop amlodipine.  Start carvedilol 3.125 mg twice daily  -Previously didn't tolerate MRA, BB, or ACEI according to [facility] but there was discussion of trialing on low-dose BB.  It appeared that he was not a candidate for advanced heart therapies.  Will trial beta-blocker as above  -Follow-up in about 4 to 6 weeks to see if he can tolerate beta-blockade.    -The patient did not tolerate ACE I, can consider Entresto next visit.     PAF  Hypercoagulable state due to AF  [id number] at least 6 (age, CHF, CAD, hypertension, CVA x 2)  -Continue Eliquis  -Start carvedilol as above    A total of 41 minutes of time was spent on day of encounter reviewing medical record, performing history and examination, counseling, ordering medication/test/consults and documentation.    All of the patient's excellent questions were answered to the best of my knowledge and to his satisfaction.  It was a pleasure seeing Mr. Francesco Schulte in my clinic today. Return in about 6 weeks (around 4/4/2024). Patient is aware to call the cardiology clinic with any questions or concerns.      Shannon Martinez MD  [facility]  [facility], [address]  [address] [address]  Phone: [phone]  Fax: [phone]

## 2024-02-23 NOTE — PROGRESS NOTES
INFECTIOUS  DISEASE  OUTPATIENT CLINIC  NOTE   Subjective   Primary care provider: Tala Dunne M.D..     Reason for Follow Up:   Follow-up for   1. Subacute osteomyelitis of right foot (HCC)        2. Dementia, unspecified dementia severity, unspecified dementia type, unspecified whether behavioral, psychotic, or mood disturbance or anxiety (HCC)        3. Chronic right-sided congestive heart failure (HCC)            HPI: Patient known to ID team from 2 years prior  Francesco Schulte is a 73 y.o. male with a history of PMH of DM2, HTN, A fib, CHF with EF of 15-25% s/p AICD, HLD, Lupus, and polycythemia vera who presented with new right sided weakness on 2/2/24 .CT and MRI were consistent with right occipital CVA. Patient reportedly cannot have statins and has had a history of low SBP. He was started on Amlodipine and monitored. Patient with elevated troponins which were trended down. Patient was admitted to [facility] on 2/5/2024.  During patient admission in [facility] rehab he was noticed to have a wound on his right foot second toe which was concerning for osteomyelitis.  X-ray of the right foot shows soft tissue wound of the second toe with subjacent cortical erosion of the distal phalanx, consistent with osteomyelitis.  Patient was not started on antibiotic therapy and was referred to ID clinic for further evaluation.  D/W Aretha (pt daughter [phone]) and would like to have ID eval for management possibilities.  Afterwards, a decision will be made as to proceed with treatment or go home with hospice.    02/23/24- Today Patient reports feeling well.Denies drainage, pungent odor, redness, pain. Denies feeling generally ill, fevers/chills, general malaise, headache, n/v/d.  Patient is pleasantly confused.  Baseline dementia per chart.  Attempted to phone call daughter Aretha to discuss plan of care.  Right foot second toe with increased redness, no swelling or drainage.   Dry callused skin.  X-ray of right foot shows erosion of the distal phalanx consistent with osteomyelitis.  Ultrasound arterial shows mild diffuse plaque and multiphasic flow throughout the common femoral,    superficial femoral, and popliteal arteries with no hemodynamically significant stenosis.  Normal Doppler velocities & waveforms. Three vessel runoff to the ankle with normal triphasic flow.  Calcification  observed in the tibial arteries.  Due to history of diabetes and atherosclerosis there is concern that antibiotic would be able to penetrate osteomyelitis and cure her infection.  Unknown bacteria causing infection at this time.  Will start patient on p.o. Augmentin 875/125 mg twice daily for 2 weeks and see if he has any improvements and if he does we will extend out to a total of 6 weeks of antibiotic therapy.  Patient is at high risk though of ongoing infection despite antibiotic therapy due to atherosclerosis and diabetes.  Recommend follow-up with orthopedic surgery as he may require amputation for source control.  Follow-up in 2 weeks for close monitoring    Current Antimicrobials: Start p.o. Augmentin 875/125 mg twice daily, 2-week course with the possibilities of extending  Previous Antimicrobials:    Other Current Medications:  Home Medications    Medication Sig Taking? Last Dose Authorizing Provider   omeprazole (PRILOSEC) 20 MG Tablet Delayed Response delayed-release tablet Take  by mouth every day.   Physician Outpatient   oxyCODONE immediate release (ROXICODONE) 10 MG immediate release tablet Take 10 mg by mouth every four hours as needed for Moderate Pain.   Physician Outpatient   senna-docusate (SB DOCUSATE SODIUM/SENNA) 8.6-50 MG Tab Take 1 Tablet by mouth every day.   Physician Outpatient   acetaminophen (TYLENOL) 325 MG Tab Take 650 mg by mouth every four hours as needed.   Physician Outpatient   carvedilol (COREG) 3.125 MG Tab Take 1 Tablet by mouth 2 times a day with meals.   Shannon REILLY  "JANES Martinez   aspirin 81 MG EC tablet Take 1 Tablet by mouth every evening.   Jorgito Starks M.D.   insulin glargine (LANTUS SOLOSTAR) 100 UNIT/ML Solution Pen-injector injection Inject 12 Units under the skin every day.   Jorgito Starks M.D.   insulin lispro 100 UNIT/ML SC SOPN injection PEN Inject 2-12 Units under the skin 4 Times a Day,Before Meals and at Bedtime.   Jorgito Starks M.D.   vitamin D3 (CHOLECALCIFEROL) 1000 UNIT Tab Take 1 Tablet by mouth every day.  Patient not taking: Reported on 2/22/2024   Jorgito Starks M.D.   apixaban (ELIQUIS) 5mg Tab Take 1 Tablet by mouth 2 times a day.   Tala Dunne M.D.   finasteride (PROSCAR) 5 MG Tab TAKE 1 TABLET BY MOUTH EVERY DAY   Tala Dunne M.D.   tamsulosin (FLOMAX) 0.4 MG capsule Take 1 Capsule by mouth every day.   Tala Dunne M.D.   dicyclomine (BENTYL) 10 MG Cap TAKE 1 CAPSULE BY MOUTH EVERY 6 HOURS AS NEEDED (ABDOMINAL CRAMPS).   Tala Dunne M.D.   hydroxyurea (HYDREA) 500 MG Cap Take 500 mg by mouth every morning.  Patient not taking: Reported on 2/22/2024   Physician Outpatient        PMH:  Past Medical History:   Diagnosis Date    Acute bacterial endocarditis 11/02/2021    Agent orange exposure     Anesthesia     resistant to pain meds and \"numbing medications\"    Cancer ([facility])     polycythemia vera    Catheter-associated urinary tract infection ([facility]) 08/09/2022    This is a new problem, patient has had urinary catheter inserted in emergency room 4 weeks ago due to urinary retention.  He has been experiencing subjective fever, right flank pain.  He tells me he has experienced similar symptoms are when he was diagnosed with urinary tract infection last time.          Diabetes (HCC)     insulin and oral meds    Family history of punctured lung 2002    left lung    Heart attack ([facility]) 03/2017    [facility]    Hemorrhagic disorder ([facility])     on " blood thinners    High cholesterol     Ischemic cardiomyopathy     Kidney stones     hx of kidney stones    Lupus (HCC) 11/15/2019    Orthostatic hypotension 03/29/2018    Pain     headache pain    Polycythemia vera(238.4)     Stroke ([facility]) 2016    r/t afib; eye issues resolved afterward    Urinary bladder disorder     enlarged prostate, weak stream, difficulty urinating    Vertigo      Past Surgical History:   Procedure Laterality Date    WA DX BONE MARROW BIOPSIES Left 11/8/2023    Procedure: BIOPSY, BONE MARROW, USING NEEDLE OR TROCAR;  Surgeon: Ingrid Zambrano M.D.;  Location: SURGERY SAME DAY [facility];  Service: Orthopedics    BONE ASPIRATION BIOPSY Left 11/8/2023    Procedure: BONE MARROW BIOPSY AND ASPIRATION - DR. ANDERSON;  Surgeon: Ingrid Zambrano M.D.;  Location: SURGERY SAME DAY [facility];  Service: Orthopedics    PAMELA N/A 10/30/2021    Procedure: ECHOCARDIOGRAM, TRANSESOPHAGEAL;  Surgeon: Beau Gutierrez M.D.;  Location: [facility];  Service: Cardiac    AICD IMPLANT  07/29/2021    [employer] [id number] implanted by Dr. Isaac.    SPLIT THICKNESS SKIN GRAFT N/A 8/23/2020    Procedure: APPLICATION, GRAFT, SKIN, SPLIT-THICKNESS;  Surgeon: Elisa Craig M.D.;  Location: [facility];  Service: Plastics    SKIN ABSCESS INCISION AND DRAINAGE Right 8/3/2020    Procedure: INCISION AND DRAINAGE - SCROTAL WOUND - WOUND VAC PLACEMENT;  Surgeon: Jaylen Hayes M.D.;  Location: [facility];  Service: Urology    WA EXPLORE SCROTUM Right 7/31/2020    Procedure: EXPLORATION, SCROTUM - FOR WASH OUT OF SCROTAL WOUND & WOUND VAC PLACEMENT;  Surgeon: Ferny Rosales M.D.;  Location: [facility];  Service: Urology    WA EXPLORE SCROTUM Right 7/29/2020    Procedure: EXPLORATION, SCROTUM - FOR I & D OF SCROTAL AND  GROIN WOUND;  Surgeon: Donta Viera M.D.;  Location: [facility];  Service: Urology    WA INCIS/DRAIN SCROTUM/TESTIS,EPIDIDYM Right 7/25/2020     Procedure: INCISION AND DRAINAGE, SCROTUM;  Surgeon: Nick Negro M.D.;  Location: SURGERY [facility];  Service: Urology    TEMPORAL ARTERY BIOPSY Right 6/26/2018    Procedure: TEMPORAL ARTERY BIOPSY;  Surgeon: Rajendra Aguilar M.D.;  Location: SURGERY SAME DAY [facility];  Service: General    CAROTID ENDARTERECTOMY Right 3/21/2018    Procedure: CAROTID ENDARTERECTOMY- W/EEG MONITORING;  Surgeon: Benny Morfin M.D.;  Location: SURGERY [facility];  Service: General    APPENDECTOMY      CATH PLACEMENT      right arm    LAMINOTOMY      diskectomy; C4    OTHER CARDIAC SURGERY      stents x 3     Family History   Problem Relation Age of Onset    Ovarian Cancer Neg Hx     Diabetes Neg Hx      Social History     Socioeconomic History    Marital status:      Spouse name: Not on file    Number of children: Not on file    Years of education: Not on file    Highest education level: Not on file   Occupational History    Not on file   Tobacco Use    Smoking status: Never    Smokeless tobacco: Never   Vaping Use    Vaping Use: Never used   Substance and Sexual Activity    Alcohol use: No    Drug use: No    Sexual activity: Not on file   Other Topics Concern    Not on file   Social History Narrative    Not on file     Social Determinants of Health     Financial Resource Strain: Low Risk  (8/25/2022)    Overall Financial Resource Strain (CARDIA)     Difficulty of Paying Living Expenses: Not hard at all   Food Insecurity: No Food Insecurity (8/25/2022)    Hunger Vital Sign     Worried About Running Out of Food in the Last Year: Never true     Ran Out of Food in the Last Year: Never true   Transportation Needs: No Transportation Needs (2/16/2024)    PRAPARE - Transportation     Lack of Transportation (Medical): No     Lack of Transportation (Non-Medical): No   Physical Activity: Not on file   Stress: Not on file   Social Connections: Not on file   Intimate Partner Violence: Not on file   Housing  "Stability: Not on file           Allergies/Intolerances:  Allergies   Allergen Reactions    Atorvastatin      States he  when he took it and required life saving measures    Doxycycline      Swelling lips and difficulties swallowing    Lisinopril Anaphylaxis     Pt coded    Simvastatin      States he  while taking a statin and required life saving measures    Statins [Hmg-Coa-R Inhibitors]      States he  while taking a statin and required life saving measures    Beta Adrenergic Blockers Unspecified     dizziness    Eplerenone Unspecified     Intolerance, dehydration, altered mental status    Metformin Palpitations and Unspecified     Cardiac effects  \"Similar to a heart attack, I was hospitalized\"    Spironolactone Palpitations       ROS:   Review of Systems   Constitutional:  Negative for chills, fever, malaise/fatigue and weight loss.   HENT:  Negative for congestion and hearing loss.    Eyes:  Negative for blurred vision and double vision.   Respiratory:  Negative for cough, sputum production, shortness of breath and wheezing.    Cardiovascular:  Negative for chest pain and palpitations.   Gastrointestinal:  Negative for abdominal pain, diarrhea, nausea and vomiting.   Genitourinary:  Negative for dysuria.   Musculoskeletal:  Negative for myalgias.   Skin:  Negative for itching and rash.   Neurological:  Negative for dizziness, focal weakness and headaches.   Endo/Heme/Allergies:  Does not bruise/bleed easily.   Psychiatric/Behavioral:  Positive for memory loss. The patient is not nervous/anxious.       ROS was reviewed and were negative except as above.    Objective    Most Recent Vital Signs:  There were no vitals taken for this visit.    Physical Exam:  Physical Exam  Vitals reviewed.   HENT:      Head: Normocephalic and atraumatic.      Nose: Nose normal.      Mouth/Throat:      Mouth: Mucous membranes are moist.   Eyes:      Pupils: Pupils are equal, round, and reactive to light. "   Cardiovascular:      Rate and Rhythm: Normal rate.   Pulmonary:      Effort: Pulmonary effort is normal.      Breath sounds: Normal breath sounds.   Abdominal:      Palpations: Abdomen is soft.   Musculoskeletal:         General: No tenderness.      Cervical back: Normal range of motion and neck supple.      Comments: Right foot second toe with mild erythema, no swelling or drainage.  Slight warmth to palpation.  See clinical photo attached  Callused skin on multiple toes, dry cracked   Skin:     General: Skin is warm and dry.      Coloration: Skin is not jaundiced.      Findings: No erythema or rash.   Neurological:      Mental Status: He is alert. He is disoriented.      Motor: Weakness present.   Psychiatric:         Mood and Affect: Mood normal.         Behavior: Behavior normal.          Pertinent Lab/Imaging Results:  [unfilled]  @CMP@  WBC   Date/Time Value Ref Range Status   02/16/2024 05:33 AM 8.1 4.8 - 10.8 K/uL Final     RBC   Date/Time Value Ref Range Status   02/16/2024 05:33 AM 6.20 (H) 4.70 - 6.10 M/uL Final     Hemoglobin   Date/Time Value Ref Range Status   02/16/2024 05:33 AM 15.7 14.0 - 18.0 g/dL Final     Hematocrit   Date/Time Value Ref Range Status   02/16/2024 05:33 AM 49.4 42.0 - 52.0 % Final     MCV   Date/Time Value Ref Range Status   02/16/2024 05:33 AM 79.7 (L) 81.4 - 97.8 fL Final     MCH   Date/Time Value Ref Range Status   02/16/2024 05:33 AM 25.3 (L) 27.0 - 33.0 pg Final     MCHC   Date/Time Value Ref Range Status   02/16/2024 05:33 AM 31.8 (L) 32.3 - 36.5 g/dL Final     Comment:     Please note new reference range effective 05/22/2023.     MPV   Date/Time Value Ref Range Status   02/16/2024 05:33 AM 10.2 9.0 - 12.9 fL Final      Sodium   Date/Time Value Ref Range Status   02/16/2024 05:33  135 - 145 mmol/L Final     Potassium   Date/Time Value Ref Range Status   02/16/2024 05:33 AM 3.8 3.6 - 5.5 mmol/L Final     Chloride   Date/Time Value Ref Range Status   02/16/2024 05:33 AM  103 96 - 112 mmol/L Final     Co2   Date/Time Value Ref Range Status   02/16/2024 05:33 AM 23 20 - 33 mmol/L Final     Glucose   Date/Time Value Ref Range Status   02/16/2024 05:33 AM 99 65 - 99 mg/dL Final     Bun   Date/Time Value Ref Range Status   02/16/2024 05:33 AM 29 (H) 8 - 22 mg/dL Final     Creatinine   Date/Time Value Ref Range Status   02/16/2024 05:33 AM 1.16 0.50 - 1.40 mg/dL Final     Alkaline Phosphatase   Date/Time Value Ref Range Status   02/06/2024 06:39 AM 73 30 - 99 U/L Final     AST(SGOT)   Date/Time Value Ref Range Status   02/06/2024 06:39 AM 12 12 - 45 U/L Final     ALT(SGPT)   Date/Time Value Ref Range Status   02/06/2024 06:39 AM 11 2 - 50 U/L Final     Total Bilirubin   Date/Time Value Ref Range Status   02/06/2024 06:39 AM 0.6 0.1 - 1.5 mg/dL Final      CPK Total   Date/Time Value Ref Range Status   08/19/2022 04:06 AM 25 0 - 154 U/L Final          Blood Culture   Date Value Ref Range Status   01/03/2019   Final    No growth after 5 days of incubation.  Blood culture testing and Gram stain, if indicated, are  performed at [facility], [address] [place] [address].[place], [place].  Positive blood cultures are  sent to [facility], [address] [place] [address], for organism identification and  susceptibility testing.         Blood Culture   Date Value Ref Range Status   01/03/2019   Final    No growth after 5 days of incubation.  Blood culture testing and Gram stain, if indicated, are  performed at [facility], [facility].[place], [place].  Positive blood cultures are  sent to [facility], [address] [place] [address], for organism identification and  susceptibility testing.              Impression/Assessment      1. Subacute osteomyelitis of right foot (HCC)        2. Dementia, unspecified dementia severity, unspecified dementia type, unspecified whether behavioral, psychotic, or mood disturbance  or anxiety (HCC)        3. Chronic right-sided congestive heart failure (HCC)          Francesco Schulte is a 73 y.o. male with a history of PMH of DM2, HTN, A fib, CHF with EF of 15-25% s/p AICD, HLD, Lupus, and polycythemia vera who presented with new right sided weakness on 2/2/24 .CT and MRI were consistent with right occipital CVA. Patient reportedly cannot have statins and has had a history of low SBP. He was started on Amlodipine and monitored. Patient with elevated troponins which were trended down. Patient was admitted to [facility] on 2/5/2024.  During patient admission in [facility] rehab he was noticed to have a wound on his right foot second toe which was concerning for osteomyelitis.  X-ray of the right foot shows soft tissue wound of the second toe with subjacent cortical erosion of the distal phalanx, consistent with osteomyelitis.  Patient was not started on antibiotic therapy and was referred to ID clinic for further evaluation.  D/W Aretha (pt daughter [phone]) and would like to have ID eval for management possibilities.  Afterwards, a decision will be made as to proceed with treatment or go home with hospice.    02/23/24- Today Patient reports feeling well.Denies drainage, pungent odor, redness, pain. Denies feeling generally ill, fevers/chills, general malaise, headache, n/v/d.  Patient is pleasantly confused.  Baseline dementia per chart.  Attempted to phone call daughter Aretha to discuss plan of care.  Right foot second toe with increased redness, no swelling or drainage.  Dry callused skin.  X-ray of right foot shows erosion of the distal phalanx consistent with osteomyelitis.  Ultrasound arterial shows mild diffuse plaque and multiphasic flow throughout the common femoral,    superficial femoral, and popliteal arteries with no hemodynamically significant stenosis.  Normal Doppler velocities & waveforms. Three vessel runoff to the ankle with normal triphasic flow.   Calcification  observed in the tibial arteries.  Due to history of diabetes and atherosclerosis there is concern that antibiotic would be able to penetrate osteomyelitis and cure her infection.  Unknown bacteria causing infection at this time.  Will start patient on p.o. Augmentin 875/125 mg twice daily for 2 weeks and see if he has any improvements and if he does we will extend out to a total of 6 weeks of antibiotic therapy.  Patient is at high risk though of ongoing infection despite antibiotic therapy due to atherosclerosis and diabetes.  Recommend follow-up with orthopedic surgery as he may require amputation for source control.  Follow-up in 2 weeks for close monitoring    -Allergy to doxycycline  PLAN:   - Start p.o. Augmentin 875/125 mg twice daily, 2-week course with the possibility of extending.  - Repeat Labs CBC/CMP in 2 weeks  - Medication education provided and S/S of side effects discussed   - Recommend follow up with orthopedic surgery.  Patient may possibly require amputation for source control  - Recommend starting wound care to right foot second toe  - Recommended to start po probiotic  - Check blood sugars 3-4 times daily and keep below 140 to prevent worsening/secondary infection  - Education provided on S/S of infection and when to report to ER/ call 911       Return visit: 2 weeks. Follow up with primary care physician for chronic medical problems      I have performed a physical exam,  updated ROS and plan today. I have reviewed previous images, labs, and provider notes.      CONNER Aviles.    All Patients should seek medical re-evaluation or report to the ER for new, increasing or worsening symptoms. In some circumstances medical conditions can change from the initial evaluation and may require emergent medical re-evaluation. This includes but is not limited to chest pain, shortness of breath, atypical abdominal pain, atypical headache, ALOC, fever >101, low blood pressure, high  respiratory rate (above 30), low oxygen saturation (below 90%), acute delirium, abnormal bleeding, inability to tolerate any intake, weakness on one side of the body, any worsened or concerning conditions.    Please note that this dictation was created using voice recognition software. I have worked with technical experts from [facility] to optimize the interface.  I have made every reasonable attempt to correct obvious errors, but there may be errors of grammar and possibly content that I did not discover before finalizing the note.

## 2024-03-07 NOTE — TELEPHONE ENCOUNTER
Appointment canceled for Francesco Schulte ([id number])   Visit Type: FOLLOW UP VISIT   Date        Time      Length    Provider                  Department   3/12/2024    8:30 AM  30 mins.  Nurse Practitioner ALVINO Aviles ID [facility]      Reason for Cancellation: Cancelled via MyChart      Patient Comments: Francesco is now in [facility] care at home, so all appointments are canceled.     Appointment Rescheduled  (Newest Message First)  View All Conversations on this Encounter  Team, Your Healthcare  Francesco Schulte10 hours ago (8:56 PM)     YT  Appointment Information:      Visit Type: Follow Up Visit          Date: 3/12/2024                  Dept: [facility]                  Provider: KIAH ALONZO                  Time: 8:30 AM                  Length: 30 min     Appt Status: Canceled         Cancel Reason: Cancelled via MyChart       This JUNIQEhart message has not been read.     Francesco Jaylen Eris  P [facility]  Staff10 hours ago (8:56 PM)       Appointment canceled for Francesco Schulte ([id number])  Visit Type: FOLLOW UP VISIT  Date        Time      Length    Provider                  Department  3/12/2024    8:30 AM  30 mins.  Nurse Practitioner ALVINO Aviles ID [facility]     Reason for Cancellation: Cancelled via MyChart     Patient Comments: Francesco is now in [facility] care at home, so all appointments are canceled.       Team, Your Healthcare  Francesco Schulte2 days ago     YT  Appointment Information:      Visit Type: Follow Up Visit          Date: 3/12/2024                  Dept: [facility]                  Provider: KIAH ALONZO                  Time: 8:30 AM                  Length: 30 min     Appt Status: Scheduled           Original Appointment Information:      Visit Type: Follow Up Visit          Date: 3/8/2024                  Dept: [facility]                  Provider: KIAH ALONZO                   Time: 2:00 PM                  Length: 30 min         Cancel Reason: Provider Personal

## 2024-03-15 NOTE — CASE COMMUNICATION
recieved /tc from Spouse Alysa- patient having nausea, pain, and agitation. He had taken zofran, then vomited green liquid and having worsening abdominal pain. She had given 1/4ml (0.5mg) lorazepam and 1/2 ml (10mg) roxanol approxiamtely 10 -15 min prior to calling and patient still yelling, agitated and restless. Discussed medications take approximately 30 min to start to work and gave permission for spouse to allow patient space and s ee if he will calm down. t/c with Dr. Montiel recieved 1 time dose orders for roxanol and lorazepam if 1st dose is ineffective. Reached out to RNCM who indictated she can see patient next. t/c to Alysa 15 min later- who reports patient is calmer and medications seem to be effective, 1 time dose not needed. relayed that RNCM will be arriving in approx 15-30 min for a visit and follow up with symptom management.

## 2024-03-17 ENCOUNTER — HOME CARE VISIT (OUTPATIENT)
Dept: HOSPICE | Facility: HOSPICE | Age: 74
End: 2024-03-17
Payer: MEDICARE

## 2024-03-17 VITALS — RESPIRATION RATE: 32 BRPM | DIASTOLIC BLOOD PRESSURE: 66 MMHG | SYSTOLIC BLOOD PRESSURE: 116 MMHG | HEART RATE: 88 BPM

## 2024-03-17 ASSESSMENT — PAIN SCALES - PAIN ASSESSMENT IN ADVANCED DEMENTIA (PAINAD)
FACIALEXPRESSION: 2 - FACIAL GRIMACING.
BODYLANGUAGE: 1 - TENSE. DISTRESSED PACING. FIDGETING.
TOTALSCORE: 8
NEGVOCALIZATION: 2 - REPEATED TROUBLE CALLING OUT. LOUD MOANING OR GROANING. CRYING.
CONSOLABILITY: 2 - UNABLE TO CONSOLE, DISTRACT OR REASSURE.

## 2024-03-17 ASSESSMENT — ENCOUNTER SYMPTOMS
FATIGUE: 1
PAIN: 1
PERSON REPORTING PAIN: DIRECT OBSERVATION
LAST BOWEL MOVEMENT: 66914
SHORTNESS OF BREATH: 1
MUSCLE WEAKNESS: 1
AGITATION: 1
DIARRHEA: 1
DRY SKIN: 1
FATIGUES EASILY: 1

## 2024-03-17 ASSESSMENT — SOCIAL DETERMINANTS OF HEALTH (SDOH): ACTIVE STRESSOR - HEALTH CHANGES: 1

## 2024-03-17 ASSESSMENT — ACTIVITIES OF DAILY LIVING (ADL): CURRENT_FUNCTION: ONE PERSON

## 2024-03-20 ENCOUNTER — HOME CARE VISIT (OUTPATIENT)
Dept: HOSPICE | Facility: HOSPICE | Age: 74
End: 2024-03-20
Payer: MEDICARE

## 2024-09-08 NOTE — ASSESSMENT & PLAN NOTE
- Likely secondary to UTI   - Monitor for resolution with treatment of UTI   - Mild, no focal neuro deficits      21-Sep-2024 hearing difficulty

## 2025-06-13 NOTE — CARE PLAN
The patient is Stable - Low risk of patient condition declining or worsening    Shift Goals  Clinical Goals: Shower, pain management  Patient Goals: Consult surgery, shower/bed bath, walk    Progress made toward(s) clinical / shift goals:  bed bath, oxy reordered    Patient is not progressing towards the following goals: Not consulting surgery until HIDA scan       91

## (undated) DEVICE — SODIUM CHL IRRIGATION 0.9% 1000ML (12EA/CA)

## (undated) DEVICE — GLOVE BIOGEL SZ 8 SURGICAL PF LTX - (50PR/BX 4BX/CA)

## (undated) DEVICE — GOWN SURGEONS X-LARGE - DISP. (30/CA)

## (undated) DEVICE — TUBING CLEARLINK DUO-VENT - C-FLO (48EA/CA)

## (undated) DEVICE — SYRINGE 10 ML CONTROL LL (25EA/BX 4BX/CA)

## (undated) DEVICE — DRAPE LAPAROTOMY T SHEET - (12EA/CA)

## (undated) DEVICE — NEPTUNE 4 PORT MANIFOLD - (20/PK)

## (undated) DEVICE — GOWN SURGEONS LARGE - (32/CA)

## (undated) DEVICE — TUBE E-T HI-LO CUFF 7.5MM (10EA/PK)

## (undated) DEVICE — SUTURE 6-0 PROLENE BV-1 D/A 24 (36PK/BX)"

## (undated) DEVICE — NEEDLE NON SAFETY 25 GA X 1 1/2 IN HYPO (100EA/BX)

## (undated) DEVICE — GLOVE BIOGEL SZ 6 PF LATEX - (50EA/BX 4BX/CA)

## (undated) DEVICE — STERI STRIP COMPOUND BENZOIN - TINCTURE 0.6ML WITH APPLICATOR (40EA/BX)

## (undated) DEVICE — GAUZE FLUFF STERILE 2-PLY 36 X 36 (100EA/CA)

## (undated) DEVICE — CANISTER SUCTION 3000ML MECHANICAL FILTER AUTO SHUTOFF MEDI-VAC NONSTERILE LF DISP  (40EA/CA)

## (undated) DEVICE — BOVIE BLADE COATED - (50/PK)

## (undated) DEVICE — GOWN WARMING STANDARD FLEX - (30/CA)

## (undated) DEVICE — SUTURE GENERAL

## (undated) DEVICE — LACTATED RINGERS INJ 1000 ML - (14EA/CA 60CA/PF)

## (undated) DEVICE — GLOVE BIOGEL SZ 7.5 SURGICAL PF LTX - (50PR/BX 4BX/CA)

## (undated) DEVICE — ELECTRODE DUAL RETURN W/ CORD - (50/PK)

## (undated) DEVICE — DRAPE SURGICAL U 77X120 - (10/CA)

## (undated) DEVICE — RESERVOIR SUCTION 100 CC - SILICONE (20EA/CA)

## (undated) DEVICE — SUTURE 3-0 SILK 12 X 18 IN - (36/BX)

## (undated) DEVICE — MASK ANESTHESIA ADULT  - (100/CA)

## (undated) DEVICE — SUCTION INSTRUMENT YANKAUER BULBOUS TIP W/O VENT (50EA/CA)

## (undated) DEVICE — DRESSING TRANSPARENT FILM TEGADERM 4 X 4.75" (50EA/BX)"

## (undated) DEVICE — TRAY CATHETER FOLEY URINE METER W/STATLOCK 350ML (10EA/CA)

## (undated) DEVICE — DRESSING TEGADERM 8 X 12 - (10/BX 8BX/CA)

## (undated) DEVICE — CATHETER IV 20 GA X 1-1/4 ---SURG.& SDS ONLY--- (50EA/BX)

## (undated) DEVICE — INTRAOP NEURO IN OR 1:1 PER 15 MIN

## (undated) DEVICE — SODIUM CHL. IRRIGATION 0.9% 3000ML (4EA/CA 65CA/PF)

## (undated) DEVICE — BANDAID SHEER STRIP 3/4 IN (100EA/BX 12BX/CA)

## (undated) DEVICE — SUTURE 3-0 VICRYL PLUS SH - 8X 18 INCH (12/BX)

## (undated) DEVICE — TUBE, CULTURE AEROBIC

## (undated) DEVICE — SOD. CHL. INJ. 0.9% 1000 ML - (14EA/CA 60CA/PF)

## (undated) DEVICE — DRESSING 3X8 ADAPTIC GAUZE - NON-ADHERING (36/PK 6PK/BX)

## (undated) DEVICE — SET EXTENSION WITH 2 PORTS (48EA/CA) ***PART #2C8610 IS A SUBSTITUTE*****

## (undated) DEVICE — SLEEVE, VASO, THIGH, MED

## (undated) DEVICE — SOD. CHL. INJ. 0.9% 250 ML - (36/CA 50CA/PF)

## (undated) DEVICE — DRAPE MAGNETIC (INSTRA-MAG) - (30/CA)

## (undated) DEVICE — KIT ROOM DECONTAMINATION

## (undated) DEVICE — DETERGENT RENUZYME PLUS 10 OZ PACKET (50/BX)

## (undated) DEVICE — GLOVE BIOGEL SZ 8.5 SURGICAL PF LTX - (50PR/BX 4BX/CA)

## (undated) DEVICE — SUTURE 2-0 VICRYL PLUS CT-1 - 8 X 18 INCH(12/BX)

## (undated) DEVICE — DRAIN PENROSE STERILE 1/4 X - 18 IN  (25EA/BX)

## (undated) DEVICE — KIT  I.V. START (100EA/CA)

## (undated) DEVICE — SUTURE 5-0 PROLENE BLUE C-1 HS 1 X 30 (36EA/BX)"

## (undated) DEVICE — PEN SKIN MARKER W/RULER - (50EA/BX)

## (undated) DEVICE — CANISTER SUCTION RIGID RED 1500CC (40EA/CA)

## (undated) DEVICE — HEAD HOLDER JUNIOR/ADULT

## (undated) DEVICE — STAPLER SKIN DISP - (6/BX 10BX/CA) VISISTAT

## (undated) DEVICE — SPONGE GAUZESTER 4 X 4 4PLY - (128PK/CA)

## (undated) DEVICE — SODIUM CHL. INJ. 0.9% 500ML (24EA/CA 50CA/PF)

## (undated) DEVICE — CANNULA DIVIDED ADULT CO2 - SAMPLE W/FEMALE CONNCT (25/CA)

## (undated) DEVICE — SUTURE 5-0 CHROMIC GUT PS-5 - (12/BX) 18 INCH

## (undated) DEVICE — SUTURE 4-0 CHROMIC GUTSH 27 (36PK/BX)"

## (undated) DEVICE — SET LEADWIRE 5 LEAD BEDSIDE DISPOSABLE ECG (1SET OF 5/EA)

## (undated) DEVICE — Device

## (undated) DEVICE — WIPE INSTRUMENT MICROWIPE (20EA/SP)

## (undated) DEVICE — KIT SURGIFLO W/OUT THROMBIN - (6EA/CA)

## (undated) DEVICE — CHLORAPREP 26 ML APPLICATOR - ORANGE TINT(25/CA)

## (undated) DEVICE — CLIP MED INTNL HRZN TI ESCP - (25/BX)

## (undated) DEVICE — BLADE SURGICAL #11 - (50/BX)

## (undated) DEVICE — TUBE CONNECTING SUCTION - CLEAR PLASTIC STERILE 72 IN (50EA/CA)

## (undated) DEVICE — BOVIE NEEDLE TIP INSULATD NON-SAFETY 2CM (50/PK)

## (undated) DEVICE — GLOVE, LITE (PAIR)

## (undated) DEVICE — KIT ANESTHESIA W/CIRCUIT & 3/LT BAG W/FILTER (20EA/CA)

## (undated) DEVICE — TIP INTPLS HFLO ML ORFC BTRY - (12/CS)  FOR SURGILAV

## (undated) DEVICE — ELECTRODE 850 FOAM ADHESIVE - HYDROGEL RADIOTRNSPRNT (50/PK)

## (undated) DEVICE — MASK, LARYNGEAL AIRWAY #4

## (undated) DEVICE — SUTURE 2-0 CHROMIC GUT SH 27 (36PK/BX)"

## (undated) DEVICE — CLOSURE SKIN STRIP 1/2 X 4 IN - (STERI STRIP) (50/BX 4BX/CA)

## (undated) DEVICE — GLOVE BIOGEL PI ULTRATOUCH SZ 7.5 SURGICAL PF LF -(50/BX 4BX/CA)

## (undated) DEVICE — PROTECTOR ULNA NERVE - (36PR/CA)

## (undated) DEVICE — SUTURE 3-0 VICRYL PLUS RB-1 - (36/BX)

## (undated) DEVICE — GAUZE PACKING STRIP STERILE IODOFORM 1 IN X 5 YDS

## (undated) DEVICE — TOWEL STOP TIMEOUT SAFETY FLAG (40EA/CA)

## (undated) DEVICE — SUTURE 4-0 30CM STRATAFIX SPIRAL PS-2 (12EA/BX)

## (undated) DEVICE — SYRINGE 3 CC 22 GA X 1-1/2 - NDL SAFETY (50/BX 8BX/CA)

## (undated) DEVICE — SUTURE 3-0 CHROMIC GUT FS-2 27 (36PK/BX)"

## (undated) DEVICE — PACK MINOR BASIN - (2EA/CA)

## (undated) DEVICE — BRIEF STRETCH MATERNITY M/L - FITS 20-60IN (5EA/BG 20BG/CA)

## (undated) DEVICE — WATER IRRIGATION STERILE 1000ML (12EA/CA)

## (undated) DEVICE — CLIP SM INTNL HRZN TI ESCP LGT - (24EA/PK 25PK/BX)

## (undated) DEVICE — DERMACARRIER 8 (NEW MESHGFT) - (10/BX)

## (undated) DEVICE — GLOVE BIOGEL PI INDICATOR SZ 6.5 SURGICAL PF LF - (50/BX 4BX/CA)

## (undated) DEVICE — TRAY SRGPRP PVP IOD WT PRP - (20/CA)

## (undated) DEVICE — DEPRESSOR TONGUE ADLT STERILE - 6 IN (100EA/BX)

## (undated) DEVICE — GLOVE BIOGEL PI INDICATOR SZ 7.5 SURGICAL PF LF -(50/BX 4BX/CA)

## (undated) DEVICE — SENSOR SPO2 NEO LNCS ADHESIVE (20/BX) SEE USER NOTES

## (undated) DEVICE — NEEDLE NON SAFETY HYPO 22 GA X 1 1/2 IN (100/BX)

## (undated) DEVICE — BLADE SURGICAL #15 - (50/BX 3BX/CA)

## (undated) DEVICE — SUTURE CV

## (undated) DEVICE — SUTURE 4-0 CHROMIC FS-2 (36EA/BX)

## (undated) DEVICE — VESSELOOP MINI BLUE STERILE - SURG-I-LOOP (10EA/BX)

## (undated) DEVICE — HANDPIECE 10FT INTPLS SCT PLS IRRIGATION HAND CONTROL SET (6/PK)

## (undated) DEVICE — SOD. CHL 10CC SYRINGE PREFILL - W/10 CC (30/BX)

## (undated) DEVICE — VESSELOOP MAXI BLUE STERILE- SURG-I-LOOP (10EA/BX)

## (undated) DEVICE — SUTURE 3-0 ETHILON FS-1 - (36/BX) 30 INCH

## (undated) DEVICE — VAC CANISTER W/GEL 500ML - FITS NEW MACHINES (10EA/CA)

## (undated) DEVICE — SUTURE 3-0 VICRYL PLUS - 12 X 18 INCH (12/BX)

## (undated) DEVICE — POLY UMBILICAL TAPE 1/8X30 - (36/BX)

## (undated) DEVICE — DRESSING KIT V.A.C. SENSA T.R.A.C. SMALL (10EA/CA)

## (undated) DEVICE — GUARD SPLASH FOR PULSEVAC (12EA/PK)

## (undated) DEVICE — GLOVE BIOGEL SZ 6.5 SURGICAL PF LTX (50PR/BX 4BX/CA)

## (undated) DEVICE — DRESSING PETROLEUM GAUZE 5 X 9" (50EA/BX 4BX/CA)"

## (undated) DEVICE — TOWELS CLOTH SURGICAL - (4/PK 20PK/CA)

## (undated) DEVICE — BLADE DERMATOME NEW AIR (10/BX)

## (undated) DEVICE — BAG DECANTER (50EA/CS)

## (undated) DEVICE — DRAIN J-VAC 7MM FLAT - (10EA/CA)

## (undated) DEVICE — SWAB ANAEROBIC SPEC.COLLECTOR - (25/PK 4PK/CA 100EA/CA)

## (undated) DEVICE — SENSOR OXIMETER ADULT SPO2 RD SET (20EA/BX)

## (undated) DEVICE — SYRINGE NON SAFETY 5 CC 20 GA X 1-1/2 IN (100/BX 4BX/CA)